# Patient Record
Sex: MALE | Race: WHITE | NOT HISPANIC OR LATINO | Employment: FULL TIME | URBAN - METROPOLITAN AREA
[De-identification: names, ages, dates, MRNs, and addresses within clinical notes are randomized per-mention and may not be internally consistent; named-entity substitution may affect disease eponyms.]

---

## 2017-01-17 DIAGNOSIS — E11.9 TYPE 2 DIABETES MELLITUS WITHOUT COMPLICATION, WITH LONG-TERM CURRENT USE OF INSULIN: ICD-10-CM

## 2017-01-17 DIAGNOSIS — Z79.4 TYPE 2 DIABETES MELLITUS WITHOUT COMPLICATION, WITH LONG-TERM CURRENT USE OF INSULIN: ICD-10-CM

## 2017-01-17 DIAGNOSIS — Z12.11 SPECIAL SCREENING FOR MALIGNANT NEOPLASMS, COLON: Primary | ICD-10-CM

## 2017-01-17 RX ORDER — SODIUM, POTASSIUM,MAG SULFATES 17.5-3.13G
1 SOLUTION, RECONSTITUTED, ORAL ORAL AS DIRECTED
Qty: 354 ML | Refills: 0 | Status: ON HOLD | OUTPATIENT
Start: 2017-01-17 | End: 2017-02-17 | Stop reason: ALTCHOICE

## 2017-01-17 NOTE — TELEPHONE ENCOUNTER
----- Message from Nasra Cramer sent at 1/17/2017  8:50 AM CST -----  Contact: self/ [phone]  RX request - refill or new RX.  Is this a refill or new RX:  refill  RX name and strength: insulin glargine (LANTUS SOLOSTAR) 100 unit/mL (3 mL) InPn pen  Directions:  Inject 60 Units into the skin once daily. INJECT 50 UNITS INTO THE SKIN IN THE EVENING - Subcutaneous  Is this a 30 day or 90 day RX:    Pharmacy name and phone #: [id number] - [place], [place] - [address]   Comments:

## 2017-01-18 RX ORDER — INSULIN GLARGINE 100 [IU]/ML
60 INJECTION, SOLUTION SUBCUTANEOUS DAILY
Qty: 15 ML | Refills: 6 | Status: SHIPPED | OUTPATIENT
Start: 2017-01-18 | End: 2017-01-18

## 2017-01-18 RX ORDER — INSULIN GLARGINE 100 [IU]/ML
INJECTION, SOLUTION SUBCUTANEOUS
Qty: 15 SYRINGE | Refills: 3 | Status: SHIPPED | OUTPATIENT
Start: 2017-01-18 | End: 2017-03-31 | Stop reason: SDUPTHER

## 2017-01-18 RX ORDER — PEN NEEDLE, DIABETIC 30 GX3/16"
1 NEEDLE, DISPOSABLE MISCELLANEOUS
Qty: 100 EACH | Refills: 12 | Status: ON HOLD | OUTPATIENT
Start: 2017-01-18 | End: 2022-11-28 | Stop reason: SDUPTHER

## 2017-01-23 DIAGNOSIS — E11.9 TYPE 2 DIABETES MELLITUS WITHOUT COMPLICATION: ICD-10-CM

## 2017-02-16 ENCOUNTER — ANESTHESIA EVENT (OUTPATIENT)
Dept: ENDOSCOPY | Facility: HOSPITAL | Age: 52
End: 2017-02-16
Payer: COMMERCIAL

## 2017-02-17 ENCOUNTER — ANESTHESIA (OUTPATIENT)
Dept: ENDOSCOPY | Facility: HOSPITAL | Age: 52
End: 2017-02-17
Payer: COMMERCIAL

## 2017-02-17 ENCOUNTER — SURGERY (OUTPATIENT)
Age: 52
End: 2017-02-17

## 2017-02-17 VITALS — RESPIRATION RATE: 18 BRPM

## 2017-02-17 PROBLEM — Z13.9 SCREENING: Status: ACTIVE | Noted: 2017-02-17

## 2017-02-17 PROCEDURE — D9220A PRA ANESTHESIA: Mod: PT,CRNA,, | Performed by: NURSE ANESTHETIST, CERTIFIED REGISTERED

## 2017-02-17 PROCEDURE — D9220A PRA ANESTHESIA: Mod: PT,ANES,, | Performed by: ANESTHESIOLOGY

## 2017-02-17 PROCEDURE — 63600175 PHARM REV CODE 636 W HCPCS: Performed by: NURSE ANESTHETIST, CERTIFIED REGISTERED

## 2017-02-17 PROCEDURE — 25000003 PHARM REV CODE 250: Performed by: NURSE PRACTITIONER

## 2017-02-17 PROCEDURE — 25000003 PHARM REV CODE 250: Performed by: NURSE ANESTHETIST, CERTIFIED REGISTERED

## 2017-02-17 RX ORDER — PROPOFOL 10 MG/ML
VIAL (ML) INTRAVENOUS
Status: DISCONTINUED | OUTPATIENT
Start: 2017-02-17 | End: 2017-02-17

## 2017-02-17 RX ORDER — PROPOFOL 10 MG/ML
VIAL (ML) INTRAVENOUS CONTINUOUS PRN
Status: DISCONTINUED | OUTPATIENT
Start: 2017-02-17 | End: 2017-02-17

## 2017-02-17 RX ORDER — METOPROLOL TARTRATE 1 MG/ML
INJECTION, SOLUTION INTRAVENOUS
Status: DISCONTINUED | OUTPATIENT
Start: 2017-02-17 | End: 2017-02-17

## 2017-02-17 RX ORDER — LIDOCAINE HCL/PF 100 MG/5ML
SYRINGE (ML) INTRAVENOUS
Status: DISCONTINUED | OUTPATIENT
Start: 2017-02-17 | End: 2017-02-17

## 2017-02-17 RX ADMIN — METOPROLOL TARTRATE 1 MG: 5 INJECTION, SOLUTION INTRAVENOUS at 09:02

## 2017-02-17 RX ADMIN — PROPOFOL 80 MG: 10 INJECTION, EMULSION INTRAVENOUS at 08:02

## 2017-02-17 RX ADMIN — METOPROLOL TARTRATE 1 MG: 5 INJECTION, SOLUTION INTRAVENOUS at 08:02

## 2017-02-17 RX ADMIN — PROPOFOL 20 MG: 10 INJECTION, EMULSION INTRAVENOUS at 08:02

## 2017-02-17 RX ADMIN — SODIUM CHLORIDE: 0.9 INJECTION, SOLUTION INTRAVENOUS at 08:02

## 2017-02-17 RX ADMIN — LIDOCAINE HYDROCHLORIDE 50 MG: 20 INJECTION, SOLUTION INTRAVENOUS at 08:02

## 2017-02-17 RX ADMIN — PROPOFOL 150 MCG/KG/MIN: 10 INJECTION, EMULSION INTRAVENOUS at 08:02

## 2017-02-17 NOTE — ANESTHESIA PREPROCEDURE EVALUATION
02/17/2017  Billy Sheffield Miguel is a 51 y.o., male.    [facility] Anesthesia Evaluation    I have reviewed the Patient Summary Reports.    I have reviewed the Nursing Notes.   I have reviewed the Medications.     Review of Systems  Anesthesia Hx:  No problems with previous Anesthesia  History of prior surgery of interest to airway management or planning: Denies Family Hx of Anesthesia complications.   Denies Personal Hx of Anesthesia complications.   Social:  Non-Smoker    Cardiovascular:   Hypertension Past MI CAD  CABG/stent  MI 2013 s/p PTCA  Works as , denies HAIDER climbing 2 FOS   Pulmonary:   Sleep Apnea Noncompliant with CPAP   Renal/:  Renal/ Normal     Hepatic/GI:   Bowel Prep. Denies GERD. Denies Liver Disease.    Neurological:  Neurology Normal    Endocrine:   Diabetes, poorly controlled, type 2, using insulin HbA1C > 11       Physical Exam  General:  Morbid Obesity    Airway/Jaw/Neck:  Airway Findings: Mouth Opening: Normal Tongue: Normal  General Airway Assessment: Adult  Mallampati: II  TM Distance: Normal, at least 6 cm  Jaw/Neck Findings:  Neck ROM: Normal ROM      Dental:  Dental Findings: Periodontal disease, SevereFront teeth upper & lower appear loose with severe decay, pt denies any loose teeth        Mental Status:  Mental Status Findings:  Cooperative, Alert and Oriented         Anesthesia Plan  Type of Anesthesia, risks & benefits discussed:  Anesthesia Type:  general  Patient's Preference:   Intra-op Monitoring Plan:   Intra-op Monitoring Plan Comments:   Post Op Pain Control Plan:   Post Op Pain Control Plan Comments:   Induction:   IV  Beta Blocker:  Patient is on a Beta-Blocker and has not received dose within the past 24 hours due to non-compliance or for other reasons (Patient should receive a perioperative dose or document why it is withheld).       Informed Consent:  Patient understands risks and agrees with Anesthesia plan.  Questions answered. Anesthesia consent signed with patient.  ASA Score: 3     Day of Surgery Review of History & Physical:    H&P update referred to the surgeon.         Ready For Surgery From Anesthesia Perspective.

## 2017-02-17 NOTE — ANESTHESIA POSTPROCEDURE EVALUATION
"Anesthesia Post Evaluation    Patient: Billy Rivera    Procedure(s) Performed: Procedure(s) (LRB):  COLONOSCOPY (N/A)    Final Anesthesia Type: general  Patient location during evaluation: GI PACU  Patient participation: Yes- Able to Participate  Level of consciousness: awake and alert  Post-procedure vital signs: reviewed and stable  Pain management: adequate  Airway patency: patent  PONV status at discharge: No PONV  Anesthetic complications: no      Cardiovascular status: hemodynamically stable  Respiratory status: unassisted, spontaneous ventilation and room air  Hydration status: euvolemic  Follow-up not needed.        Visit Vitals    /78    Pulse 63    Temp 36.8 °C (98.3 °F)    Resp 16    Ht 6' 2" (1.88 m)    Wt 127 kg (280 lb)    SpO2 96%    BMI 35.95 kg/m2       Pain/Gabriel Score: Pain Assessment Performed: Yes (2/17/2017  9:58 AM)  Presence of Pain: denies (2/17/2017  9:58 AM)  Gabriel Score: 10 (2/17/2017  9:58 AM)      "

## 2017-02-17 NOTE — TRANSFER OF CARE
"Anesthesia Transfer of Care Note    Patient: Billy Rivera    Procedure(s) Performed: Procedure(s) (LRB):  COLONOSCOPY (N/A)    Patient location: PACU    Anesthesia Type: general    Transport from OR: Transported from OR on room air with adequate spontaneous ventilation    Post pain: adequate analgesia    Post assessment: no apparent anesthetic complications and tolerated procedure well    Post vital signs: stable    Level of consciousness: sedated    Nausea/Vomiting: no nausea/vomiting    Complications: none          Last vitals:   Visit Vitals    BP (!) 159/94 (BP Location: Left arm, Patient Position: Sitting, BP Method: Automatic)    Pulse 67    Temp 36.8 °C (98.3 °F) (Oral)    Resp 17    Ht 6' 2" (1.88 m)    Wt 127 kg (280 lb)    SpO2 96%    BMI 35.95 kg/m2     "

## 2017-02-17 NOTE — ANESTHESIA RELEASE NOTE
"Anesthesia Release from PACU Note    Patient: Billy Rivera    Procedure(s) Performed: Procedure(s) (LRB):  COLONOSCOPY (N/A)    Anesthesia type: general    Post pain: Adequate analgesia    Post assessment: no apparent anesthetic complications and tolerated procedure well    Last Vitals:   Visit Vitals    /78    Pulse 63    Temp 36.8 °C (98.3 °F)    Resp 16    Ht 6' 2" (1.88 m)    Wt 127 kg (280 lb)    SpO2 96%    BMI 35.95 kg/m2       Post vital signs: stable    Level of consciousness: awake and alert     Nausea/Vomiting: no nausea/no vomiting    Complications: none    Airway Patency: patent    Respiratory: unassisted, spontaneous ventilation, room air    Cardiovascular: stable and blood pressure at baseline    Hydration: euvolemic  "

## 2017-02-22 ENCOUNTER — TELEPHONE (OUTPATIENT)
Dept: INTERNAL MEDICINE | Facility: CLINIC | Age: 52
End: 2017-02-22

## 2017-02-22 NOTE — TELEPHONE ENCOUNTER
----- Message from Bell Stovall sent at 2/22/2017  2:57 PM CST -----  Contact: Mobile: [phone]   Patient would like to get test results.  Name of test (lab, mammo, etc.):  Colonoscopy  Date of test:  2/17  Ordering provider: Dr. Baker   [facility] was the test performed:[facility]  Comments:

## 2017-02-23 NOTE — TELEPHONE ENCOUNTER
I spoke with the patient this evening regarding his recent colonoscopy and polyp biopsy.  Advise that on the polyps did show an area of adenocarcinoma in that further follow-up would be required.  He understood.  He is going to communicate with 's office in colorectal surgery for further discussion and disposition.

## 2017-03-10 ENCOUNTER — TELEPHONE (OUTPATIENT)
Dept: SURGERY | Facility: CLINIC | Age: 52
End: 2017-03-10

## 2017-03-10 NOTE — TELEPHONE ENCOUNTER
----- Message from Sophie Rahman sent at 3/10/2017  2:35 PM CST -----  Contact: pt#[phone]  Pt wants to speak with you about colonoscopy. Please call

## 2017-03-13 ENCOUNTER — OFFICE VISIT (OUTPATIENT)
Dept: SURGERY | Facility: CLINIC | Age: 52
End: 2017-03-13
Payer: COMMERCIAL

## 2017-03-13 ENCOUNTER — HOSPITAL ENCOUNTER (OUTPATIENT)
Dept: CARDIOLOGY | Facility: CLINIC | Age: 52
Discharge: HOME | End: 2017-03-13
Payer: COMMERCIAL

## 2017-03-13 VITALS
BODY MASS INDEX: 36.84 KG/M2 | HEART RATE: 54 BPM | WEIGHT: 287.06 LBS | SYSTOLIC BLOOD PRESSURE: 154 MMHG | DIASTOLIC BLOOD PRESSURE: 93 MMHG | HEIGHT: 74 IN

## 2017-03-13 DIAGNOSIS — C18.7 MALIGNANT NEOPLASM OF SIGMOID COLON: ICD-10-CM

## 2017-03-13 DIAGNOSIS — C18.7 MALIGNANT NEOPLASM OF SIGMOID COLON: Primary | ICD-10-CM

## 2017-03-13 PROCEDURE — 99999 PR PBB SHADOW E&M-EST. PATIENT-LVL IV: CPT | Mod: PBBFAC,,, | Performed by: COLON & RECTAL SURGERY

## 2017-03-13 PROCEDURE — 3080F DIAST BP >= 90 MM HG: CPT | Mod: S$GLB,,, | Performed by: COLON & RECTAL SURGERY

## 2017-03-13 PROCEDURE — 99213 OFFICE O/P EST LOW 20 MIN: CPT | Mod: S$GLB,,, | Performed by: COLON & RECTAL SURGERY

## 2017-03-13 PROCEDURE — 3077F SYST BP >= 140 MM HG: CPT | Mod: S$GLB,,, | Performed by: COLON & RECTAL SURGERY

## 2017-03-13 PROCEDURE — 1160F RVW MEDS BY RX/DR IN RCRD: CPT | Mod: S$GLB,,, | Performed by: COLON & RECTAL SURGERY

## 2017-03-13 PROCEDURE — 93000 ELECTROCARDIOGRAM COMPLETE: CPT | Mod: S$GLB,,, | Performed by: INTERNAL MEDICINE

## 2017-03-13 RX ORDER — NEOMYCIN SULFATE 500 MG/1
1000 TABLET ORAL SEE ADMIN INSTRUCTIONS
Qty: 6 TABLET | Refills: 0 | Status: SHIPPED | OUTPATIENT
Start: 2017-03-13 | End: 2017-03-30 | Stop reason: ALTCHOICE

## 2017-03-13 RX ORDER — METRONIDAZOLE 500 MG/1
500 TABLET ORAL SEE ADMIN INSTRUCTIONS
Qty: 3 TABLET | Refills: 0 | Status: SHIPPED | OUTPATIENT
Start: 2017-03-13 | End: 2017-03-30 | Stop reason: ALTCHOICE

## 2017-03-13 NOTE — LETTER
March 17, 2017        DRE Baker MD  [address]  [place] [address]             [facility]  [address]  [facility] [address]  Phone: [phone]   Patient: Billy Rivera   MR Number: [id number]   YOB: 1965   Date of Visit: 3/13/2017       Dear Dr. Baker:    Thank you for referring Billy Rivera to me for evaluation. Attached you will find relevant portions of my assessment and plan of care.    If you have questions, please do not hesitate to call me. I look forward to following Billy Rivera along with you.    Sincerely,      Simon Gomez MD            CC  No Recipients    Enclosure

## 2017-03-15 ENCOUNTER — TELEPHONE (OUTPATIENT)
Dept: CARDIOLOGY | Facility: CLINIC | Age: 52
End: 2017-03-15

## 2017-03-15 ENCOUNTER — ANESTHESIA EVENT (OUTPATIENT)
Dept: SURGERY | Facility: HOSPITAL | Age: 52
DRG: 331 | End: 2017-03-15
Payer: COMMERCIAL

## 2017-03-15 NOTE — TELEPHONE ENCOUNTER
----- Message from Brianna Rios RN sent at 3/15/2017 11:25 AM CDT -----  Patient is having a Sigmoidectomy-Laparoscopic on 4/5/17, with Dr. Gomez. Requesting preop instructions regarding patient takeing a 81mg -baby aspirin daily since placement of his CV stent.  Please advise whether patient needs to stop before surgery (how many days to hold?) or if patientt should continue to take prior to surgery date.  Await your reply.  Thank you. Sincerely,  Brianna Rios RN  3/15/17

## 2017-03-15 NOTE — TELEPHONE ENCOUNTER
----- Message from Tricia Taveras sent at 3/15/2017 12:08 PM CDT -----  Contact: Wendy/pre-op dept  Please call Wendy at [phone]. She did a pre-assessment today. Pt having a colon procedure on 04/05 with Dr Gomez. She has questions regarding pt taking baby aspirin because of his CV stent. Wendy sent Dr Joselito forrest in basket mess today also. Last seen Dr Yee 08/13/14    Thank you

## 2017-03-15 NOTE — ANESTHESIA PREPROCEDURE EVALUATION
Anesthesia Assessment: Preoperative EQUATION    Planned Procedure: Procedure(s) (LRB):  SIGMOIDECTOMY-LAPAROSCOPIC (N/A)  Requested Anesthesia Type:General  Surgeon: Simon Gomez MD  Service: Colon and Rectal  Known or anticipated Date of Surgery:4/5/2017    Surgeon notes: reviewed and Malignant neoplasm of sigmoid colon    Electronic QUestionnaire Assessment completed via nurse interview with patient.        Billy Rivera [[id number]] - 51 y.o. Male        Providers Outside of [facility]      No data to display       Surgical Risk Level      Surgical Risk Level:  3           caRDScore (Clinical Anesthesia Rapid Decision Score)        Very High  Total Score: 33      33 Sum of Clinical Scores       caRDScores (Grouped)      caRDScore - Ane:  4                caRDScore - CVD:  18                caRDScore - Pul:  1                caRDScore - Met:  7                caRDScore - Phy:  3           caRDScore Items           Pre-admit from 4/5/2017 in [facility]     Anesthesia      Other problem not listed above needing discussion  Yes [extremely sore muscles post-op with Colonoscopy-2/1017]     Has sleep apnea confirmed by a sleep study / on CPAP  Yes [does not use C-PAP]     Has/has had bowel disease of small or large intestine  Yes [colon cancer]     CVD      Activity similar to best ability for maximal activity or exercise  METS 4     Diagnosed with high blood pressure  Yes     Typical BP runs <150/90  Yes     Has had heart attack (MI) at some point in past  Yes [2/2017]     MI happened within past year  Yes     MI occurred within the last 6 months  Yes     Has coronary artery disease  Yes     Had stent done within the past year  Yes     Chest pain is called angina by physician (nitroglycerin prescribed)  Yes     Had angina in the last month  Yes [no chest pain since stent placement per patient]     PVD in abdomen or legs  Yes [history]     Pulmonary      Has sleep apnea confirmed by a sleep  study / on CPAP  Yes [does not use C-PAP]     Metabolic      Type 2 Diabetes  Yes     Taking chronic Insulin Rx now or in past, (not by pump)  Yes     Last A1C was between 9.0 and 12.0  Yes [11.7-6/24/16]     Is on oral Rx and/or non-insulin injectable for diabetes  Yes     Has hyperlipoproteinemia  Yes     Physiologic      Aspirin (taken because I have stents)  Yes       Flags      Red Flag Score:  2                Yellow Flag Score:  8           Red Flags           Pre-admit from 4/5/2017 in [facility]     Aspirin (taken because I have stents)  Yes     Had stent done within the past year  Yes       Yellow Flags           Pre-admit from 4/5/2017 in [facility]     Has had surgery within the last 3 months  Yes     Other problem not listed above needing discussion  Yes [extremely sore muscles post-op with Colonoscopy-2/1017]     Aspirin  Yes     Has sleep apnea confirmed by a sleep study / on CPAP  Yes [does not use C-PAP]     MI happened within past year  Yes     MI occurred within the last 6 months  Yes     Had angina in the last month  Yes [no chest pain since stent placement per patient]     Last A1C was between 9.0 and 12.0  Yes [11.7-6/24/16]       PONV Risk Score (assumes periop narcotic use = +1, Max=4)      PONV Risk Score:  2           PONV Risk Factors  Total Score: 1      1 Non-Smoker at present       Sleep Apnea  Total Score: 1      1 Has sleep apnea confirmed by a sleep study / on CPAP       MICHAELA STOP-Bang Risk Factors (Max=8)  Total Score: 6      1 Has loud snoring     1 Observed to have interrupted breathing during sleep     1 Takes medication for high blood pressure     1 Obesity Status: Obesity (BMI >35)     1 Age >50     1 Male       MICHAELA Risk Level - 1 (Low), 2 (Moderate), 3 (High)      MICHAELA Risk Level:  3           RCRI (Revised Cardiac Risk Indices of ACC/AHA guidelines, Max=6)  Total Score: 3      1 Has/has had CAD     1 Patient is having an intra-abdominal  thoracic or major vascular case     1 Taking chronic insulin       CAD Risk Factors  Total Score: 6      1 Male. Age >45     1 Exercise on a routine basis     1 Diagnosed with high blood pressure     1 Has/has had non-cardiac arterial blockages or aneurysm (PVD) now or in the past     1 Has Diabetes     1 Has hyperlipoproteinemia       CHADS Score if applicable (history of atrial fib/flutter, Max=6)  Total Score: 2      1 Diagnosed with high blood pressure     1 Has Diabetes       Maximal Exercise Capacity           Pre-admit from 4/5/2017 in [facility]     Maximal Exercise Capacity  METS 4       Summary of Dependence  Total Score: 1      1 Is totally independent of others for activities of daily living       Phone Fraility Score (Max = 17)  Total Score: 1      1 Uses 5 or more meds on reg basis       Pain Factors      No data to display       Risk Triggers (Evidence-Based Risk Triggers)        Pulmonary Risk Triggers  Total Score: 1      1 Has sleep apnea confirmed by a sleep study / on CPAP       Renal Risk Triggers  Total Score: 4      1 Diagnosed with high blood pressure     1 PVD in abdomen or legs     1 Type 2 Diabetes     1 Taking chronic Insulin Rx now or in past, (not by pump)       Delirium Risk Triggers  Total Score: 0        Urologic Risk Triggers  Total Score: 0        Logistics        Pre-op Clinic Logistics  Total Score: 19      1 Has had surgery within the last 3 months     1 Has had anesthesia, either as adult or as a child     1 Other problem not listed above needing discussion     3 Aspirin (taken because I have stents)     1 Takes medication for high blood pressure     1 MI happened within past year     2 MI occurred within the last 6 months     1 Has coronary artery disease     2 Had stent done within the past year     3 Chest pain has been evaluated by catheterization / angiogram     1 Had angina in the last month     2 Last A1C was between 9.0 and 12.0       DOSC Logistics   Total Score: 1      1 Aspirin (taken because I have stents)       Discharge Logistics  Total Score: 3      1 Has sleep apnea confirmed by a sleep study / on CPAP     2 Chest pain is called angina by physician (nitroglycerin prescribed)       Discharge Planning           Pre-admit from 4/5/2017 in [facility]     Discharge Planning      Will assist patient 24/7, if needed  -- [[phone]]       Anes & Int Med <For office use only>      Total Score: 28        Surgical Risk Level Assessment                 Triage considerations:     The patient has no apparent active cardiac condition (No unstable coronary Syndrome such as severe unstable angina or recent [<1 month] myocardial infarction, decompensated CHF, severe valvular   disease or significant arrhythmia)    Previous anesthesia records:2/17/17-Colonoscopy-General- Airway/Jaw/Neck:  Airway Findings: Mouth Opening: Normal Tongue: Normal General Airway Assessment: Adult Mallampati: II TM Distance: Normal, at least 6 cm Jaw/Neck Findings: Neck ROM: Normal ROM -No PONV - no apparent anesthetic complications and tolerated procedure well    Anesthesia Hx:  No problems with previous Anesthesia History of prior surgery of interest to airway management or planning: Denies Family Hx of Anesthesia complications. Denies Personal Hx of Anesthesia complications.     Last PCP note: 6-12 months ago , within [facility] , focused visit   Subspecialty notes: Cardiology: General, Endocrinology    Other important co-morbidities:   HTN (hypertension) 9/27/2012 - Present   NSTEMI May 2013 - peak troponin 0.22 5/22/2013 - Present   Dyslipidemia 5/22/2013 - Present   Type II or unspecified type diabetes mellitus with ophthalmic manifestations, uncontrolled 5/24/2013 - Present   Obesity, Class II, BMI 35-39.9, with comorbidity 5/24/2013 - Present   CAD s/p PCI of LAD (SHELBY) in May 2013 6/25/2013 - Present   BDR (background diabetic retinopathy) - Both Eyes  12/30/2013 - Present   Type II or unspecified type diabetes mellitus with cardiovascular complication, uncontrolled 6/15/2015 - Present   PVD (peripheral vascular disease) 6/15/2015 - Present   Type II or unspecified type diabetes mellitus with peripheral circulatory disorders, uncontrolled(250.72) 6/15/2015 - Present   Sleep apnea 6/15/2015 - Present   Microalbuminuria 6/15/2015 - Present   Screening        Tests already available:  Results have been reviewed.Labs-3/13/17-CEA/CBC/CMP/   EKG-3/13/17/ 6/24/16-Lab-A1c            Instructions given. (See in Nurse's note)    Optimization:  Anesthesia Preop Clinic Assessment  Indicated    Medical Opinion Indicated           Plan:    Testing:  A1C and T&S    Pre-anesthesia  visit       Visit focus: concerns in complex and/or prolonged anesthesia     Consultation:IM Perioperative Hospitalist     Patient  has previously scheduled Medical Appointment:Appointments on 3/24/17 prior to surgery date.    Navigation: Labs-A1c & T&S on  3/24/17 @ 7:30a              Consults scheduled.POC & Perioperative IM Consult on 3/24/17 @ 8a & 9a             Results will be tracked by Preop Clinic.                Brianna Rios RN  3/15/17                                                                                                             03/15/2017  Billy Sheffield Miguel is a 51 y.o., male.    [facility] Anesthesia Evaluation         Review of Systems  Anesthesia Hx:  No problems with previous Anesthesia History of prior surgery of interest to airway management or planning: heart surgery. Previous anesthesia: MAC  colonoscopy 2/2017 with MAC.  Procedure performed at an [facility] Facility.   Social:  Non-Smoker, No Alcohol Use    Hematology/Oncology:  Hematology Normal      Current/Recent Cancer. (sigmoid colon cancer)   EENT/Dental:   Glasses; jaw popping R   Cardiovascular:   Hypertension Past MI CAD  CABG/stent  hyperlipidemia NSTEMI 2013 s/p PTCA-SHELBY to LAD Functional Capacity good / => 4  METS, housework, stairs, mows yard; denies CP, SOB    Pulmonary:   Sleep Apnea Noncompliant with CPAP   Renal/:  Renal/ Normal     Hepatic/GI:   Denies GERD. Liver Disease,    Musculoskeletal:  Musculoskeletal Normal    Neurological:  Neurology Normal    Endocrine:   Diabetes (A1C 12.1  3/24/17), poorly controlled, type 2, using insulin    Psych:  Phobia and Claustrophobia (did not tolerate CPAP mask on face-did not like the straps).         Physical Exam  General:  Well nourished    Airway/Jaw/Neck:  Airway Findings: Mouth Opening: Normal Tongue: Normal  General Airway Assessment: Adult  Jaw/Neck Findings:     Neck ROM: Normal ROM  Neck Findings:  Girth Increased, Short Neck      Dental:  Dental Findings:         Mental Status:  Mental Status Findings:  Cooperative, Alert and Oriented       Pt was seen in POC 3/24/17; Medical optimization: please see EPIC notes for recommendations of pre-op medical consultant, Dr Shipley, for perioperative medical management. /Zainab Chaidez RN    Addendum 4/3/17 1400: Dr Cortez informed of most recent A1C result of 12.1; in basket message also sent to inform Dr Gomez (via staff) and referred to review Dr Shipley's recommendations in his note./Zainab Chaidez RN      Anesthesia Plan  Type of Anesthesia, risks & benefits discussed:  Anesthesia Type:  general  Patient's Preference:   Intra-op Monitoring Plan:   Intra-op Monitoring Plan Comments:   Post Op Pain Control Plan:   Post Op Pain Control Plan Comments:   Induction:   IV  Beta Blocker:  Patient is on a Beta-Blocker and has received one dose within the past 24 hours (No further documentation required).       Informed Consent: Patient understands risks and agrees with Anesthesia plan.  Questions answered. Anesthesia consent signed with patient.  ASA Score: 3     Day of Surgery Review of History & Physical:    H&P update referred to the surgeon.         Ready For Surgery From Anesthesia Perspective.

## 2017-03-15 NOTE — PRE ADMISSION SCREENING
Anesthesia Assessment: Preoperative EQUATION    Planned Procedure: Procedure(s) (LRB):  SIGMOIDECTOMY-LAPAROSCOPIC (N/A)  Requested Anesthesia Type:General  Surgeon: Simon Gomez MD  Service: Colon and Rectal  Known or anticipated Date of Surgery:4/5/2017    Surgeon notes: reviewed and Malignant neoplasm of sigmoid colon    Electronic QUestionnaire Assessment completed via nurse interview with patient.        Billy Rivera [[id number]] - 51 y.o. Male        Providers Outside of [facility]      No data to display       Surgical Risk Level      Surgical Risk Level:  3           caRDScore (Clinical Anesthesia Rapid Decision Score)        Very High  Total Score: 33      33 Sum of Clinical Scores       caRDScores (Grouped)      caRDScore - Ane:  4                caRDScore - CVD:  18                caRDScore - Pul:  1                caRDScore - Met:  7                caRDScore - Phy:  3           caRDScore Items           Pre-admit from 4/5/2017 in [facility]     Anesthesia      Other problem not listed above needing discussion  Yes [extremely sore muscles post-op with Colonoscopy-2/1017]     Has sleep apnea confirmed by a sleep study / on CPAP  Yes [does not use C-PAP]     Has/has had bowel disease of small or large intestine  Yes [colon cancer]     CVD      Activity similar to best ability for maximal activity or exercise  METS 4     Diagnosed with high blood pressure  Yes     Typical BP runs <150/90  Yes     Has had heart attack (MI) at some point in past  Yes [2/2017]     MI happened within past year  Yes     MI occurred within the last 6 months  Yes     Has coronary artery disease  Yes     Had stent done within the past year  Yes     Chest pain is called angina by physician (nitroglycerin prescribed)  Yes     Had angina in the last month  Yes [no chest pain since stent placement per patient]     PVD in abdomen or legs  Yes [history]     Pulmonary      Has sleep apnea confirmed by a sleep study  / on CPAP  Yes [does not use C-PAP]     Metabolic      Type 2 Diabetes  Yes     Taking chronic Insulin Rx now or in past, (not by pump)  Yes     Last A1C was between 9.0 and 12.0  Yes [11.7-6/24/16]     Is on oral Rx and/or non-insulin injectable for diabetes  Yes     Has hyperlipoproteinemia  Yes     Physiologic      Aspirin (taken because I have stents)  Yes       Flags      Red Flag Score:  2                Yellow Flag Score:  8           Red Flags           Pre-admit from 4/5/2017 in [facility]     Aspirin (taken because I have stents)  Yes     Had stent done within the past year  Yes       Yellow Flags           Pre-admit from 4/5/2017 in [facility]     Has had surgery within the last 3 months  Yes     Other problem not listed above needing discussion  Yes [extremely sore muscles post-op with Colonoscopy-2/1017]     Aspirin  Yes     Has sleep apnea confirmed by a sleep study / on CPAP  Yes [does not use C-PAP]     MI happened within past year  Yes     MI occurred within the last 6 months  Yes     Had angina in the last month  Yes [no chest pain since stent placement per patient]     Last A1C was between 9.0 and 12.0  Yes [11.7-6/24/16]       PONV Risk Score (assumes periop narcotic use = +1, Max=4)      PONV Risk Score:  2           PONV Risk Factors  Total Score: 1      1 Non-Smoker at present       Sleep Apnea  Total Score: 1      1 Has sleep apnea confirmed by a sleep study / on CPAP       MICHAELA STOP-Bang Risk Factors (Max=8)  Total Score: 6      1 Has loud snoring     1 Observed to have interrupted breathing during sleep     1 Takes medication for high blood pressure     1 Obesity Status: Obesity (BMI >35)     1 Age >50     1 Male       MICHAELA Risk Level - 1 (Low), 2 (Moderate), 3 (High)      MICHAELA Risk Level:  3           RCRI (Revised Cardiac Risk Indices of ACC/AHA guidelines, Max=6)  Total Score: 3      1 Has/has had CAD     1 Patient is having an intra-abdominal  thoracic or major vascular case     1 Taking chronic insulin       CAD Risk Factors  Total Score: 6      1 Male. Age >45     1 Exercise on a routine basis     1 Diagnosed with high blood pressure     1 Has/has had non-cardiac arterial blockages or aneurysm (PVD) now or in the past     1 Has Diabetes     1 Has hyperlipoproteinemia       CHADS Score if applicable (history of atrial fib/flutter, Max=6)  Total Score: 2      1 Diagnosed with high blood pressure     1 Has Diabetes       Maximal Exercise Capacity           Pre-admit from 4/5/2017 in [facility]     Maximal Exercise Capacity  METS 4       Summary of Dependence  Total Score: 1      1 Is totally independent of others for activities of daily living       Phone Fraility Score (Max = 17)  Total Score: 1      1 Uses 5 or more meds on reg basis       Pain Factors      No data to display       Risk Triggers (Evidence-Based Risk Triggers)        Pulmonary Risk Triggers  Total Score: 1      1 Has sleep apnea confirmed by a sleep study / on CPAP       Renal Risk Triggers  Total Score: 4      1 Diagnosed with high blood pressure     1 PVD in abdomen or legs     1 Type 2 Diabetes     1 Taking chronic Insulin Rx now or in past, (not by pump)       Delirium Risk Triggers  Total Score: 0        Urologic Risk Triggers  Total Score: 0        Logistics        Pre-op Clinic Logistics  Total Score: 19      1 Has had surgery within the last 3 months     1 Has had anesthesia, either as adult or as a child     1 Other problem not listed above needing discussion     3 Aspirin (taken because I have stents)     1 Takes medication for high blood pressure     1 MI happened within past year     2 MI occurred within the last 6 months     1 Has coronary artery disease     2 Had stent done within the past year     3 Chest pain has been evaluated by catheterization / angiogram     1 Had angina in the last month     2 Last A1C was between 9.0 and 12.0       DOSC Logistics   Total Score: 1      1 Aspirin (taken because I have stents)       Discharge Logistics  Total Score: 3      1 Has sleep apnea confirmed by a sleep study / on CPAP     2 Chest pain is called angina by physician (nitroglycerin prescribed)       Discharge Planning           Pre-admit from 4/5/2017 in [facility]     Discharge Planning      Will assist patient 24/7, if needed  -- [[phone]]       Anes & Int Med <For office use only>      Total Score: 28        Surgical Risk Level Assessment                 Triage considerations:     The patient has no apparent active cardiac condition (No unstable coronary Syndrome such as severe unstable angina or recent [<1 month] myocardial infarction, decompensated CHF, severe valvular   disease or significant arrhythmia)    Previous anesthesia records:2/17/17-Colonoscopy-General- Airway/Jaw/Neck:  Airway Findings: Mouth Opening: Normal Tongue: Normal General Airway Assessment: Adult Mallampati: II TM Distance: Normal, at least 6 cm Jaw/Neck Findings: Neck ROM: Normal ROM -No PONV - no apparent anesthetic complications and tolerated procedure well    Anesthesia Hx:  No problems with previous Anesthesia History of prior surgery of interest to airway management or planning: Denies Family Hx of Anesthesia complications. Denies Personal Hx of Anesthesia complications.     Last PCP note: 6-12 months ago , within [facility] , focused visit   Subspecialty notes: Cardiology: General, Endocrinology    Other important co-morbidities:   HTN (hypertension) 9/27/2012 - Present   NSTEMI May 2013 - peak troponin 0.22 5/22/2013 - Present   Dyslipidemia 5/22/2013 - Present   Type II or unspecified type diabetes mellitus with ophthalmic manifestations, uncontrolled 5/24/2013 - Present   Obesity, Class II, BMI 35-39.9, with comorbidity 5/24/2013 - Present   CAD s/p PCI of LAD (SHELBY) in May 2013 6/25/2013 - Present   BDR (background diabetic retinopathy) - Both Eyes  12/30/2013 - Present   Type II or unspecified type diabetes mellitus with cardiovascular complication, uncontrolled 6/15/2015 - Present   PVD (peripheral vascular disease) 6/15/2015 - Present   Type II or unspecified type diabetes mellitus with peripheral circulatory disorders, uncontrolled(250.72) 6/15/2015 - Present   Sleep apnea 6/15/2015 - Present   Microalbuminuria 6/15/2015 - Present   Screening        Tests already available:  Results have been reviewed.Labs-3/13/17-CEA/CBC/CMP/   EKG-3/13/17/ 6/24/16-Lab-A1c/            Instructions given. (See in Nurse's note)    Optimization:  Anesthesia Preop Clinic Assessment  Indicated    Medical Opinion Indicated           Plan:    Testing:  A1C and T&S    Pre-anesthesia  visit       Visit focus: concerns in complex and/or prolonged anesthesia     Consultation:IM Perioperative Hospitalist     Patient  has previously scheduled Medical Appointment:Appointments on 3/24/17 prior to surgery date.    Navigation: Labs-A1c & T&S on  3/24/17 @ 7:30a              Consults scheduled.POC & Perioperative IM Consult on 3/24/17 @ 8a & 9a             Results will be tracked by Preop Clinic.                Brianna Rios, RN  3/15/17

## 2017-03-24 ENCOUNTER — HOSPITAL ENCOUNTER (OUTPATIENT)
Dept: PREADMISSION TESTING | Facility: HOSPITAL | Age: 52
Discharge: HOME | End: 2017-03-24
Attending: COLON & RECTAL SURGERY
Payer: COMMERCIAL

## 2017-03-24 ENCOUNTER — INITIAL CONSULT (OUTPATIENT)
Dept: INTERNAL MEDICINE | Facility: CLINIC | Age: 52
End: 2017-03-24
Payer: COMMERCIAL

## 2017-03-24 VITALS
TEMPERATURE: 99 F | OXYGEN SATURATION: 95 % | HEART RATE: 68 BPM | BODY MASS INDEX: 36.86 KG/M2 | DIASTOLIC BLOOD PRESSURE: 84 MMHG | HEIGHT: 74 IN | WEIGHT: 287.25 LBS | SYSTOLIC BLOOD PRESSURE: 127 MMHG

## 2017-03-24 DIAGNOSIS — I10 ESSENTIAL HYPERTENSION: ICD-10-CM

## 2017-03-24 DIAGNOSIS — E11.39 TYPE II OR UNSPECIFIED TYPE DIABETES MELLITUS WITH OPHTHALMIC MANIFESTATIONS, UNCONTROLLED(250.52): ICD-10-CM

## 2017-03-24 DIAGNOSIS — R60.9 EDEMA, UNSPECIFIED TYPE: ICD-10-CM

## 2017-03-24 DIAGNOSIS — I25.10 CORONARY ARTERY DISEASE INVOLVING NATIVE CORONARY ARTERY OF NATIVE HEART WITHOUT ANGINA PECTORIS: ICD-10-CM

## 2017-03-24 DIAGNOSIS — E11.65 TYPE II OR UNSPECIFIED TYPE DIABETES MELLITUS WITH OPHTHALMIC MANIFESTATIONS, UNCONTROLLED(250.52): ICD-10-CM

## 2017-03-24 DIAGNOSIS — G47.30 SLEEP APNEA, UNSPECIFIED TYPE: Primary | ICD-10-CM

## 2017-03-24 DIAGNOSIS — R80.9 MICROALBUMINURIA: ICD-10-CM

## 2017-03-24 DIAGNOSIS — I21.4 NSTEMI (NON-ST ELEVATED MYOCARDIAL INFARCTION): ICD-10-CM

## 2017-03-24 PROCEDURE — 99999 PR PBB SHADOW E&M-EST. PATIENT-LVL IV: CPT | Mod: PBBFAC,,, | Performed by: HOSPITALIST

## 2017-03-24 PROCEDURE — 86850 RBC ANTIBODY SCREEN: CPT

## 2017-03-24 PROCEDURE — 83036 HEMOGLOBIN GLYCOSYLATED A1C: CPT

## 2017-03-24 PROCEDURE — 99244 OFF/OP CNSLTJ NEW/EST MOD 40: CPT | Mod: S$GLB,,, | Performed by: HOSPITALIST

## 2017-03-24 PROCEDURE — 86900 BLOOD TYPING SEROLOGIC ABO: CPT

## 2017-03-24 PROCEDURE — 36415 COLL VENOUS BLD VENIPUNCTURE: CPT

## 2017-03-24 NOTE — PROGRESS NOTES
Chief complaint-Preoperative evaluation , Perioperative Medical management, complication reduction plan     Date of Evaluation- 03/24/2017    PCP-  P Shree Baker MD    Future cases for Billy Rivera [[id number]]     Case ID Status Date Time Reginaldo Procedure Provider Location    [id number] [facility] 4/5/2017 10:00  SIGMOIDECTOMY-LAPAROSCOPIC Simon Gomez MD [[id number]] [facility] OR 2ND FLR      Malignant neoplasm of sigmoid colon    History of present illness- I had the pleasure of meeting this pleasant 51 y.o. gentleman in the pre op clinic prior to his elective Abdominal surgery. The patient is new to me .     I have obtained the history by speaking to the patient and by reviewing the electronic health records.    Events leading up to surgery / History of presenting illness -    Underwent a routine colonoscopy in Feb 2017 and was found to have polyps and was found to have malignant neoplasm of the sigmoid colon   No pain from this .No aggravating factors. No relieving factors     Relevant health conditions of significance for the perioperative period/ History of presenting illness -    HTN (hypertension) ,On medication  Usual BP readings 120-130/80's   Goal BP < 140/ <90    Type 2 Diabetes Mellitus  On treatment with oral agent Metformin , Novolog with meals and Lantus at bed time  Hemoglobin A1c- 11.7 - June 2016   Capillary glucose check-yes  Pre breakfast -150-160  Pre lunch -180-190  Bed time-140-150  Works as a security and as a    Variable meal pattern with reference to the number of meals and times of meals  Usual Breakfast- bowl of cereal/ eggs toast/ oat meal - Milk/ tea  Usual lunch - white bread sand witch/ plate lunch with meat, vegetable, potato, Diet coke  Usual Supper  - same as lunch- diet coke, milk, tea water  Usually skips Insulin once in 1-2 weeks  No tingling , numbness   Type II or unspecified type diabetes mellitus with ophthalmic manifestations, uncontrolled    BDR (background diabetic  retinopathy) - Both Eyes   Microalbuminuria   Was under Endocrine evaluation    Coronary artery disease -History of MI May 2013  CAD s/p PCI of LAD (SHELBY) in May 2013  No History of CABG.History of stenting  Chest pain- Location-central, shoulder blades felt weak different occasions, Radiation-none at  Rest,not  associated with shortness of breath, sweating, nausea, No more person  He had LHC -5/23/13 ,-LAD- IVUS of prox LAD 95% stenosis. Mid LAD 50% ,-D1-75%   -LCX 75% ,-RCA was not found ,INTERVENTION: Proximal LAD: The lesion was successfully intervened. Post-stenosis of 0%The following items were used: Stent Xience Rx 2.5mm X 12mm (SHELBY).   ASA, Plavix combination for 1 year and currently on ASA through the surgery    Sleep apnea .Not using either CPAP/BIPAP .. Informed the risk of worsening sleep apnea in the perioperative period  Avoidance of  supine sleep, weight gain and alcoholic beverages discussed since all of these can worsen MICHAELA     Obesity- suggested weight loss    Active cardiac conditions- none    Revised cardiac risk index predictors- coronary artery disease and insulin requiring diabetes mellitus     Functional capacity -Examples of physical activity- does what he wants,    house work,  can take 1 flight of stairs,  cutting the grass with a mower,  raking lawn ,  planting shrubs,  weeding garden,  swimming ,  dancing  and  digging ----- He can undertake all the above activities without  chest pain,chest tightness, Shortness of breath ,dizziness,lightheadedness making his exercise tolerance more  than 4 Mets.       Review of symptoms    ROS    Constitutional - No significant weight changes ,No fever  Eyes- No new vision changes  ENT- sleep apnea  Cardiac-As above   Respiratory- No cough, expectoration and  no hemoptysis  GI- Bowel movements  regular  No overt GI/ blood losses   -No dysuria and  no urinary hesitancy   MS-No unusual muscle,Joint pain  Neurologic-No unilateral weakness   Psychiatric-  No depression,Anxiety  Endocrine-  Prednisone use for over 3 weeks -no, hyperglycemic effects discussed  Hematological/Lymphatic-No spontaneous bruising, bleeding    Past Medical history- reviewed in EPIC    Pertinent negatives-   DVT-  Pulmonary embolism-      Past Surgical history - reviewed in EPIC-    Most recent procedure- Stenting  No Anaesthetic,Bleeding ,Cardiac problems with previous surgeries-  No history of delirium -  No history of post operative  nausea, vomiting-   Transiently felt generally weak after colonoscopy lasting for 5 minutes that resolved by itself    Family history- reviewed in EPIC    Heart disease-mother - older, brother- late 40's   Thrombosis- None-  Anaesthetic complications- None-  Diabetes Mellitus -parents, grand parents on both sides    Social history- reviewed in EPIC    Lives with wife  Help available post op     Medications and Allergies - reviewed in EPIC    Exam    Physical Exam  Constitutional- Vitals - Body mass index is 36.88 kg/(m^2).,   Vitals:    03/24/17 0801   BP: 127/84   Pulse: 68   Temp: 98.7 °F (37.1 °C)   sat 95 %  General appearance-Conscious,Coherent  Eyes- No conjunctival icterus,pupils  round  and reactive to light   ENT-Oral cavity- moist  , Hearing grossly normal   Neck- No thyromegaly ,Trachea -central, No jugular venous distension, No Carotid Bruit ,  Cardiovascular -Heart Sounds- Normal  and  no murmur   , No gallop rhythm   Respiratory - Normal Respiratory Effort, Normal breath sounds and  no wheeze    Peripheral pitting pedal edema-- mild and  bilateral lower extremity telangiectasia , no calf pain   Gastrointestinal -Soft abdomen, No palpable masses, Non Tender,Liver,Spleen not palpable. No-- free fluid and shifting dullness  Musculoskeletal- No finger Clubbing. Strength grossly normal   Lymphatic-No Palpable cervical, axillary,Inguinal lymphadenopathy   Psychiatric - normal effect,Orientation  Rt Dorsalis pedis pulses-palpable    Lt Dorsalis pedis  pulses- palpable   Rt Posterior tibial pulses -palpable   Left posterior tibial pulses -palpable   Miscellaneous - onychomycosis,  no suggestion of bacterial feet infection and  no renal bruit    Investigations    Review of Medicine tests    EKG- I had independently reviewed the EKG from--3/13/2017  It was reported to be showing     Sinus bradycardia  Left axis deviation  When compared with ECG of 24-MAY-2013 03:32,  Premature atrial complexes are no longer Present  T wave inversion no longer evident in Anterior leads    Echo May 2013        1 - Normal left ventricular function (EF 60%).     2 - Concentric remodeling.     3 - Normal diastolic function.     4 - Normal right ventricular function  Review of clinical lab tests-Date--3/13/2017- Creatinine-1.00 Adjusted anion gap 11  Date--3/13/2017 Hemoglobin--N  Platelet count--N    Review of old records- Was done and information gathered regards to events leading to surgery and health conditions of significance in the perioperative period    Orders placed-Endocrinology consultation      Assessment and plan    New problems to me      Preoperative  risk assessment    Cardiac    Revised cardiac risk index ( RCRI ) predictors-2 ---.Functional capacity  Is more than 4 Mets. He will be undergoing a  abdominal procedure that carries a intermediate risk     The estimated risk of the rate of adverse cardiac outcomes   6.6 %  No further cardiac work up is indicated prior to proceeding with the surgery     American Society of Anesthesiologists Physical status classification ( ASA ) class- - 3/4    Postoperative pulmonary complication risk  Canet-Low , if duration of surgery is less than 3, Intermediate , if over 3 hours       Perioperative Medical management / Optimization    Type 2 Diabetes Mellitus uncontrolled -I suggest monitoring the glucose in the perioperative period ( Before meals and bed time,if the patient is on oral feeds or every 6 hourly ,if the patient is NPO  )  Blood glucose targets in hospitalized patients are ( Critical care patients 140-180 mg/dl, Med/Surg patients ( non critical care) 100-140 mg/dl, patients on continuous enteral or parenteral nutrition 140-180 mg/dl )  .Oral Hypoglycemic agents are generally avoided during the hospital stay . If glucose is consistently elevated ,I suggest using basal ,prandial Insulin regimen to control the glucose , as elevated glucose can be associated with adverse surgical out comes. Please consider involving Hospital Medicine or Endocrinology ,if any help is needed with Glucose control. Patient will be instructed based on the pre op clinic guidelines  about adjustment of diabetic treatment  considering the NPO status for Surgery     I had educated that uncontrolled DM can cause post op complications,risk of infection, wound healing problem,increased length of stay in hospital and its associated complications.I suggest exercise as much as possible and follow diabetic diet  Suggested regular meal pattern, avoidance of sodas ,avoidance of white bread, excessive starches. Tried calling wife to discuss importance of DM control - could not talk      Sleep apnea- I suggest caution with usage of medication that can cause respiratory suppression in the perioperative period  potential ramifications of untreated sleep apnea, which could include daytime sleepiness, hypertension, heart disease and stroke were discussed    HLD-I  suggest continuation of statin during the entire perioperative period.    Hypertension-  Blood pressure is acceptable . I suggest continuation of -Coreg during the entire perioperative period. I suggest holding Lisinopril- on the morning of the surgery and can continue that  post operatively under blood pressure, electrolyte and renal function monitoring as long as they are acceptable.I suggest addressing pain control as uncontrolled pain can increased blood pressure   Goal BP < 140/ <90    Coronary artery disease  -History of MI May 2013  CAD s/p PCI of LAD (SHELBY) in May 2013  Stable    Obesity- suggested weight loss    Edema- I suggested avoidance of added salt,avoidance of NSAID's ( Except ASA 81 mg ) and suggested Limb elevation and sha hose use        Preventive perioperative care    Thromboembolic prophylaxis:  His risk factors for thrombosis include cancer, obesity, surgical procedure and age.I suggest  thromboembolic prophylaxis ( mechanical/pharmacological, weighing the risk benefits of pharmacological agent use considering oralia procedural bleeding )  during the perioperative period.I suggested being active in the post operative period.     Postoperative pulmonary complication prophylaxis-Risk factors for post operative pulmonary complications include sleep apnea and  ASA class >2- I suggest incentive Spirometry use ,  early ambulation ,  end tidal carbon dioxide  Monitoring  and   pain control so as to avoid diaphragmatic splinting       Renal complication prophylaxis-Risk factors for renal complications include Diabetes Mellitus, Hypertension and  vascular disease . I suggest keeping him well hydrated .I suggested drinking 2 litre's of water a day     Surgical site Infection Prophylaxis-I  suggest appropriate antibiotic for Prophylaxis against Surgical site infections    This visit was focussed on Preoperative evaluation , Perioperative Medical management, complication reduction plans and I suggest that the patient follows up with the primary care ,relevant sub specialists for on going health care      I appreciate the opportunity to be involved in this  pleasant  gentleman's care.Please feel free to contact me if there were any questions about this consultation     Patient is optimized- for the above     Dr JOHANA Shipley MD [facility] ( ),FACP   Perioperative Medicine  [facility]   Pager [phone]  -----------------------------------------------------    3/24- 14 28     A1c from today is 12 .1    Corresponded to Endocrinology  Called to discuss A1c- spoke to patient   Suggested sending CBG readings to me  ---------------------------------------    3/27- 1303     Called and spoke to patient   CBG- 250 before meals , 2 hours post meals  If glucose over 150 at meal time adds 10 units meal time insulin with no hypoglycemia  Increase Lantus to 52 units- may need to split the dose to twice daily   May need more Novolog scheduled     -----------------------    3/29- 8 27     Corresponded to Endocrinology - may not be able to get his glucose optimized prior to surgery . Given the cancer diagnosis we may have to accept some sub optimal glucose control , given the risk of cancer spread with delay in surgery  Chart reviewed - patient opted to come to [facility] for his diabetic care   ------------------------------------    3/31- 1703     CT - 3/31- no metastasis  Had endo visit today - Noted plan for Increased novolog  -------------------    4/3- 17 46     Had endocrinology evaluation   As per Endo Note Reports his is checking his BG TID (AM, afternoon(prelunch), bedtime). He reports all the readings are 180-190  cut back on his Lantus by 25% to 39 units tonight and the following night before surgery. Hold Novolog while on sugar-free clears and NPO  -------------------------------    4/4- 15 10     Called to follow up   unable to speak to patient   Left a message   Suggested Diabetic control  ---------------------    4/4- 17 55    Called to follow up   Left a message -reinforced DM control

## 2017-03-24 NOTE — LETTER
March 24, 2017      Miguel Nazario MD  [address] [facility] [address]           [facility] - Pre Op Consult  [address] [facility] [address]  Phone: [phone]          Patient: Billy Rivera   MR Number: [id number]   YOB: 1965   Date of Visit: 3/24/2017       Dear Dr. Miguel Nazario:    Thank you for referring Billy Rivera to me for evaluation. Attached you will find relevant portions of my assessment and plan of care.    If you have questions, please do not hesitate to call me. I look forward to following Billy Rivera along with you.    Sincerely,    Idalia Shipley MD    Enclosure  CC:  MD DRE Johnston MD Ainsley N. Jarrott, APRN,FNP-C  Billy Yee MD    If you would like to receive this communication electronically, please contact [email or web address] or [phone] to request more information on [employer] access.    For providers and/or their staff who would like to refer a patient to [facility], please contact us through our one-stop-shop provider referral line, [facility], at [phone].    If you feel you have received this communication in error or would no longer like to receive these types of communications, please e-mail [email or web address]

## 2017-03-24 NOTE — IP AVS SNAPSHOT
[facility]  [address]  [facility] [address]  Phone: [phone]           Patient Discharge Instructions    Our goal is to set you up for success. This packet includes information on your condition, medications, and your home care. It will help you to care for yourself so you don't get sicker.     Please ask your nurse if you have any questions.        There are many details to remember when preparing for your surgery. Here is what you will need to do, please ask your nurse if there are more specific instructions and if you have any questions:    1. 24 hours before procedure Do not smoke or drink alcoholic beverages 24 hours prior to your procedure    2. Eating before procedure Do not eat or drink anything 8 hours before your procedure - this includes gum, mints, and candy.     3. Day of procedure Please remove all jewelry for the procedure. If you wear contact lenses, dentures, hearing aids or glasses, bring a container to put them in during your surgery and give to a family member for safekeeping.  If your doctor has scheduled you for an overnight stay, bring a small overnight bag with any personal items that you need.    4. After procedure Make arrangements in advance for transportation home by a responsible adult. It is not safe to drive a vehicle during the 24 hours following surgery.     PLEASE NOTE: You may be contacted the day before your surgery to confirm your surgery date and arrival time. The Surgery schedule has many variables which may affect the time of your surgery case. Family members should be available if your surgery time changes.                [facility]  Unless otherwise directed by your provider, please contact [facility], our nurse care line that is available for 24/7 assistance.     [phone] (toll-free)    Registered nurses in the [facility] provide clinical advisement, health education, appointment booking, and other  advisory services.                    ** Verify the list of medication(s) below is accurate and up to date. Carry this with you in case of emergency. If your medications have changed, please notify your healthcare provider.             Medication List      TAKE these medications        Additional Info                      aspirin 81 MG Chew   Refills:  0   Dose:  81 mg    Instructions:  Take 81 mg by mouth every evening. swallows     Begin Date    AM    Noon    PM    Bedtime       atorvastatin 80 MG tablet   Commonly known as:  LIPITOR   Quantity:  30 tablet   Refills:  6   Dose:  80 mg    Instructions:  Take 1 tablet (80 mg total) by mouth once daily.     Begin Date    AM    Noon    PM    Bedtime       blood sugar diagnostic Strp   Quantity:  150 strip   Refills:  12    Instructions:  Strips and lancets  Use before meals and bedtime     Begin Date    AM    Noon    PM    Bedtime       carvedilol 12.5 MG tablet   Commonly known as:  COREG   Quantity:  60 tablet   Refills:  3   Dose:  12.5 mg    Instructions:  Take 1 tablet (12.5 mg total) by mouth 2 (two) times daily.     Begin Date    AM    Noon    PM    Bedtime       LANTUS SOLOSTAR 100 unit/mL (3 mL) Inpn pen   Quantity:  15 Syringe   Refills:  3   Generic drug:  insulin glargine    Instructions:  INJECT 50 UNITS SUBCUTANEOUSLY IN THE EVENING     Begin Date    AM    Noon    PM    Bedtime       lisinopril 20 MG tablet   Commonly known as:  PRINIVIL,ZESTRIL   Quantity:  30 tablet   Refills:  6   Dose:  20 mg    Instructions:  Take 1 tablet (20 mg total) by mouth once daily.     Begin Date    AM    Noon    PM    Bedtime       metformin 1000 MG tablet   Commonly known as:  GLUCOPHAGE   Quantity:  60 tablet   Refills:  6   Dose:  1000 mg    Instructions:  Take 1 tablet (1,000 mg total) by mouth 2 (two) times daily with meals.     Begin Date    AM    Noon    PM    Bedtime       metronidazole 500 MG tablet   Commonly known as:  FLAGYL   Quantity:  3 tablet   Refills:  0  "  Dose:  500 mg    Instructions:  Take 1 tablet (500 mg total) by mouth As instructed. Take 1 tablet at 1pm, 2pm, 11pm the day before surgery     Begin Date    AM    Noon    PM    Bedtime       neomycin 500 mg Tab   Commonly known as:  MYCIFRADIN   Quantity:  6 tablet   Refills:  0   Dose:  1000 mg    Instructions:  Take 2 tablets (1,000 mg total) by mouth As instructed. Take 2 tablets at 1pm, 2pm, 11pm the day before surgery     Begin Date    AM    Noon    PM    Bedtime       nitroGLYCERIN 0.4 MG SL tablet   Commonly known as:  NITROSTAT   Quantity:  30 tablet   Refills:  1   Dose:  0.4 mg    Instructions:  Place 1 tablet (0.4 mg total) under the tongue every 5 (five) minutes as needed for Chest pain.     Begin Date    AM    Noon    PM    Bedtime       NOVOLOG FLEXPEN 100 unit/mL Inpn pen   Quantity:  15 Syringe   Refills:  6   Generic drug:  insulin aspart    Instructions:  INJECT  15 UNITS  SUBCUTANEOUSLY WITH MEALS     Begin Date    AM    Noon    PM    Bedtime       pen needle, diabetic 31 gauge x 3/16" Ndle   Quantity:  100 each   Refills:  12   Dose:  1 each    Instructions:  Inject 1 each into the skin 3 (three) times daily before meals.     Begin Date    AM    Noon    PM    Bedtime                  Please bring to all follow up appointments:    1. A copy of your discharge instructions.  2. All medicines you are currently taking in their original bottles.  3. Identification and insurance card.    Please arrive 15 minutes ahead of scheduled appointment time.    Please call 24 hours in advance if you must reschedule your appointment and/or time.        Your Scheduled Appointments     Mar 24, 2017  1:00 PM CDT   Ct Abd Pel W Contrast with [facility] CT2 64- LIMIT 600 LBS   [facility] )    [address] [address]   [phone]            Mar 24, 2017  1:15 PM CDT   CT Chest with [facility] CT1 64- LIMIT 450 LBS   [facility]    [address] [id number] [phone]            Mar 30, 2017  9:40 AM CDT   Established Patient Visit with DRE Baker MD   [facility]    [address]  [place] [id number] [phone]              Your Future Surgeries/Procedures     Apr 05, 2017   Surgery with Simon Gomez MD   [facility] ([facility])    [address] [id number] [phone]                  Discharge Instructions       Your surgery has been scheduled for:__________________________________________    You should report to:  ____[facility], located on the [place] side of the first floor of the           [facility] ([phone])  ____[facility], located on the [facility] side of the            Second floor of the [facility] ([phone])  ____3rd Floor [facility] located on the [facility] side of the [facility] [phone]  Please Note   - Tell your doctor if you take Aspirin, products containing Aspirin, herbal medications  or blood thinners, such as Coumadin, Ticlid, or Plavix.  (Consult your provider regarding holding or stopping before surgery).  - Arrange for someone to drive you home following surgery.  You will not be allowed to leave the surgical facility alone or drive yourself home following sedation and anesthesia.  Before Surgery  - Stop taking all herbal medications 14days prior to surgery  - No Motrin/Advil (Ibuprofen) 7 days before surgery  - No Aleve (Naproxen) 7 days before surgery  - Stop Taking Asprin, products containing Asprin _____days before surgery  - Stop taking blood thinners_______days before surgery  - Refrain from drinking alcoholic beverages for 24hours before and after surgery  - Stop or limit smoking _________days before surgery  Night before Surgery  - DO NOT EAT OR DRINK  ANYTHING AFTER MIDNIGHT, INCLUDING GUM, HARD CANDY, MINTS, OR CHEWING TOBACCO.  - Take a shower or bath (shower is recommended).  Bathe with Hibiclens soap or an antibacterial soap from the neck down.  If not supplied by your surgeon, hibiclens soap will need to be purchased over the counter in pharmacy.  Rinse soap off thoroughly.  - Shampoo your hair with your regular shampoo  The Day of Surgery  - Take another bath or shower with hibiclens or any antibacterial soap, to reduce the chance of infection.  - Take heart and blood pressure medications with a small sip of water, as advised by the perioperative team.  - Do not take fluid pills  - You may brush your teeth and rinse your mouth, but do not swall any additional water.   - Do not apply perfumes, powder, body lotions or deodorant on the day of surgery.  - Nail polish should be removed.  - Do not wear makeup or moisturizer  - Wear comfortable clothes, such as a button front shirt and loose fitting pants.  - Leave all jewelry, including body piercings, and valuables at home.    - Bring any devices you will neeed after surgery such as crutches or canes.  - If you have sleep apnea, please bring your CPAP machine  In the event that your physical condition changes including the onset of a cold or respiratory illness, or if you have to delay or cancel your surgery, please notify your surgeon.Anesthesia: General Anesthesia  Youre due to have surgery. During surgery, youll be given medication called anesthesia. (It is also called anesthetic.) This will keep you comfortable and pain-free. Your surgeon will use general anesthesia. This sheet tells you more about it.    What Is General Anesthesia?  General anesthesia puts you into a state like deep sleep. It goes into the bloodstream (IV anesthetics), into the lungs (gas anesthetics), or both. You feel nothing during the procedure. You will not remember it. During the procedure, the anesthesia provider monitors you. He  or she checks your heart rate and rhythm, blood pressure, and blood oxygen.  · IV Anesthetics  IV anesthetics are given through an IV line in your arm. Theyre often given first. This is so you are asleep before a gas anesthetic is started. Some kinds of IV anesthetics relieve pain. Others relax you. Your doctor will decide which kind is best in your case.  · Gas Anesthetics  Gas anesthetics are breathed into the lungs. They are often used to keep you asleep. They can be given through a facemask, an endotracheal tube, or a laryngeal mask airway.  ¨ If you have a facemask, your anesthesia provider will most likely place it over your nose and mouth while youre still awake. Youll breathe oxygen through the mask as your IV anesthetic is started. Gas anesthetic may be added through the mask.  ¨ If you have an endotracheal tube or a laryngeal mask airway, it will be inserted into your throat after youre asleep.  Anesthesia Tools and Medications  You will likely have:  · IV anesthetics sent through an IV line into your bloodstream.  · Gas anesthetics breathed into your lungs, where they pass into your bloodstream.  · A pulse oximeter on the end of your finger. This measures your blood oxygen level.  · Electrocardiography leads (electrodes) on your chest. These record your heart rate and rhythm.  · A blood pressure cuff. This reads your blood pressure.  Risks and Possible Complications  General anesthesia has some risks. These include:  · Breathing problems  · Nausea and vomiting  · Sore throat or hoarseness  · Allergic reaction to the anesthetic  · Ongoing numbness (rare)  · Irregular heartbeat (rare)  · Cardiac arrest (rare)   Anesthesia Safety  · Follow all instructions you are given for how long not to eat or drink before your procedure.  · Be sure your doctor knows what medications you take. This includes over-the-counter medications, herbs, and supplements.  · Have an adult family member or friend drive you home  after the procedure.  · For the first 24 hours after your surgery:  ¨ Do not drive or use heavy equipment.  ¨ Do not make important decisions or sign documents.  ¨ Avoid alcohol.  ¨ Have someone stay with you, if possible. They can watch for problems and help keep you safe.  © 5527-9201 [employer], [address] [place] [address]. All rights reserved. This information is not intended as a substitute for professional medical care. Always follow your healthcare professional's instructions.      Admission Information     Date & Time Provider Department CSN    3/24/2017  9:00 AM Simon Gomez MD [facility] [id number]      Care Providers     Provider Role Specialty Primary office phone    Simon Gomez MD Attending Provider Colon and Rectal Surgery [phone]      Recent Lab Values        8/16/2011 8/24/2012 5/23/2013 10/24/2013 1/21/2014 2/23/2015 6/10/2015 6/24/2016      8:05 AM  8:10 AM  4:20 AM 12:00 PM  9:40 AM  7:17 AM  7:08 AM  7:29 AM    A1C 10.5 (H) 12.6 (H) 13.5 (H) 8.3 (H) 9.1 (H) 11.7 (H) 11.7 (H) 11.7 (H)      Allergies as of 3/24/2017        Reactions    Penicillins Anaphylaxis    Shellfish Containing Products     Other reaction(s): Unknown    Vancomycin Itching    Bactrim  [Sulfamethoxazole-trimethoprim] Rash      Advance Directives     An advance directive is a document which, in the event you are no longer able to make decisions for yourself, tells your healthcare team what kind of treatment you do or do not want to receive, or who you would like to make those decisions for you.  If you do not currently have an advance directive, [facility] encourages you to create one.  For more information call:  [phone]), [phone] ([phone]),  or log on to [email or web address].        Language Assistance Services     ATTENTION: Language assistance services are available, free of charge. Please call [phone].      ATENCIÓN: leona Story a  whittaker disposición servicios gratuitos de asistencia lingüística. Abdullahijuliette al [phone].     ROMAN Ý: N?u b?n nói Ti?ng Vi?t, có các d?ch v? h? tr? ngôn ng? mi?n phí dành cho b?n. G?i s? [phone].        Diabetes Discharge Instructions                                    [facility] complies with applicable Federal civil rights laws and does not discriminate on the basis of race, color, national origin, age, disability, or sex.

## 2017-03-24 NOTE — MR AVS SNAPSHOT
[facility] - Pre Op Consult  [address] [address]  Phone: [phone]                  [place]   3/24/2017 8:00 AM   Initial consult    Description:  Male : 1965   Provider:  Idalia Shipley MD   Department:  [facility] - Pre Op Consult           Reason for Visit     Pre-op Exam                To Do List           Future Appointments        Provider Department Dept Phone    3/24/2017  9:00 AM Zainab Chaidez RN [facility] [phone]    3/24/2017 1:00 PM [facility] CT2 64- LIMIT 600 LBS [facility] [phone]    3/24/2017 1:15 PM [facility] CT1 64- LIMIT 450 LBS [facility] [phone]    3/30/2017 9:40 AM DRE Baker MD [facility] [phone]      Your Future Surgeries/Procedures     2017   Surgery with Simon Gomez MD   [facility] ([facility])    [address] [address] [phone]              Goals (5 Years of Data)              6/24/16    6/10/15    2/23/15    HEMOGLOBIN A1C < 7.0   11.7  11.7  11.7      [facility]     [facility] Nurse Care Line -  Assistance  Registered nurses in the [facility] provide clinical advisement, health education, appointment booking, and other advisory services.  Call for this free service at [phone].             Medications           Message regarding Medications     Verify the changes and/or additions to your medication regime listed below are the same as discussed with your clinician today.  If any of these changes or additions are incorrect, please notify your healthcare provider.             Verify that the below list of medications is an accurate representation of the medications you are currently taking.  If none reported, the list may be blank. If incorrect, please contact your healthcare provider. Carry this list with you in case  "of emergency.           Current Medications     aspirin 81 MG Chew Take 81 mg by mouth every evening.     atorvastatin (LIPITOR) 80 MG tablet Take 1 tablet (80 mg total) by mouth once daily.    blood sugar diagnostic Strp Strips and lancets    Use before meals and bedtime    carvedilol (COREG) 12.5 MG tablet Take 1 tablet (12.5 mg total) by mouth 2 (two) times daily.    LANTUS SOLOSTAR 100 unit/mL (3 mL) InPn pen INJECT 50 UNITS SUBCUTANEOUSLY IN THE EVENING    lisinopril (PRINIVIL,ZESTRIL) 20 MG tablet Take 1 tablet (20 mg total) by mouth once daily.    metformin (GLUCOPHAGE) 1000 MG tablet Take 1 tablet (1,000 mg total) by mouth 2 (two) times daily with meals.    metronidazole (FLAGYL) 500 MG tablet Take 1 tablet (500 mg total) by mouth As instructed. Take 1 tablet at 1pm, 2pm, 11pm the day before surgery    neomycin (MYCIFRADIN) 500 mg Tab Take 2 tablets (1,000 mg total) by mouth As instructed. Take 2 tablets at 1pm, 2pm, 11pm the day before surgery    nitroGLYCERIN (NITROSTAT) 0.4 MG SL tablet Place 1 tablet (0.4 mg total) under the tongue every 5 (five) minutes as needed for Chest pain.    NOVOLOG FLEXPEN 100 unit/mL InPn pen INJECT  15 UNITS  SUBCUTANEOUSLY WITH MEALS    pen needle, diabetic 31 gauge x 3/16" Ndle Inject 1 each into the skin 3 (three) times daily before meals.           Clinical Reference Information           Your Vitals Were     BP Pulse Temp Height Weight SpO2    127/84 68 98.7 °F (37.1 °C) (Oral) 6' 2" (1.88 m) 130.3 kg (287 lb 4.2 oz) 95%    BMI                36.88 kg/m2          Blood Pressure          Most Recent Value    BP  127/84 [left and 118/76 right]      Allergies as of 3/24/2017     Penicillins    Shellfish Containing Products    Vancomycin    Bactrim  [Sulfamethoxazole-trimethoprim]      Immunizations Administered on Date of Encounter - 3/24/2017     None      Instructions       PreOp Instructions given:    -- Medication information (what to hold and what to take)   -- NPO " guidelines -- DO NOT EAT ANYTHING AFTER MIDNIGHT, INCLUDING GUM, HARD CANDY, MINTS, OR CHEWING TOBACCO.  -- Arrival place and directions given; time to be given the day before procedure by the Surgeon's Office   -- Bathing with antibacterial soap   -- Don't wear any jewelry or bring any valuables AM of surgery   -- No makeup or moisturizer to face   -- No perfume/cologne, powder, lotions or aftershave     Pt verbalized understanding.        Language Assistance Services     ATTENTION: Language assistance services are available, free of charge. Please call [phone].      ATENCIÓN: Si habla español, tiene a whittaker disposición servicios gratuitos de asistencia lingüística. Llame al [phone].     ROMAN Ý: N?u b?n nói Ti?ng Vi?t, có các d?ch v? h? tr? ngôn ng? mi?n phí dành cho b?n. G?i s? [phone].         [facility] complies with applicable Federal civil rights laws and does not discriminate on the basis of race, color, national origin, age, disability, or sex.

## 2017-03-24 NOTE — DISCHARGE INSTRUCTIONS
Your surgery has been scheduled for:__________________________________________    You should report to:  ____[facility], located on the [place] side of the first floor of the           [facility] ([phone])  ____[facility], located on the [facility] side of the            Second floor of the [facility] ([phone])  ____3rd Floor [facility] located on the [facility] side of the [facility] [phone]  Please Note   - Tell your doctor if you take Aspirin, products containing Aspirin, herbal medications  or blood thinners, such as Coumadin, Ticlid, or Plavix.  (Consult your provider regarding holding or stopping before surgery).  - Arrange for someone to drive you home following surgery.  You will not be allowed to leave the surgical facility alone or drive yourself home following sedation and anesthesia.  Before Surgery  - Stop taking all herbal medications 14days prior to surgery  - No Motrin/Advil (Ibuprofen) 7 days before surgery  - No Aleve (Naproxen) 7 days before surgery  - Stop Taking Asprin, products containing Asprin _____days before surgery  - Stop taking blood thinners_______days before surgery  - Refrain from drinking alcoholic beverages for 24hours before and after surgery  - Stop or limit smoking _________days before surgery  Night before Surgery  - DO NOT EAT OR DRINK ANYTHING AFTER MIDNIGHT, INCLUDING GUM, HARD CANDY, MINTS, OR CHEWING TOBACCO.  - Take a shower or bath (shower is recommended).  Bathe with Hibiclens soap or an antibacterial soap from the neck down.  If not supplied by your surgeon, hibiclens soap will need to be purchased over the counter in pharmacy.  Rinse soap off thoroughly.  - Shampoo your hair with your regular shampoo  The Day of Surgery  - Take another bath or shower with hibiclens or any antibacterial soap, to reduce the chance of infection.  - Take heart and blood  pressure medications with a small sip of water, as advised by the perioperative team.  - Do not take fluid pills  - You may brush your teeth and rinse your mouth, but do not swall any additional water.   - Do not apply perfumes, powder, body lotions or deodorant on the day of surgery.  - Nail polish should be removed.  - Do not wear makeup or moisturizer  - Wear comfortable clothes, such as a button front shirt and loose fitting pants.  - Leave all jewelry, including body piercings, and valuables at home.    - Bring any devices you will neeed after surgery such as crutches or canes.  - If you have sleep apnea, please bring your CPAP machine  In the event that your physical condition changes including the onset of a cold or respiratory illness, or if you have to delay or cancel your surgery, please notify your surgeon.Anesthesia: General Anesthesia  Youre due to have surgery. During surgery, youll be given medication called anesthesia. (It is also called anesthetic.) This will keep you comfortable and pain-free. Your surgeon will use general anesthesia. This sheet tells you more about it.    What Is General Anesthesia?  General anesthesia puts you into a state like deep sleep. It goes into the bloodstream (IV anesthetics), into the lungs (gas anesthetics), or both. You feel nothing during the procedure. You will not remember it. During the procedure, the anesthesia provider monitors you. He or she checks your heart rate and rhythm, blood pressure, and blood oxygen.  · IV Anesthetics  IV anesthetics are given through an IV line in your arm. Theyre often given first. This is so you are asleep before a gas anesthetic is started. Some kinds of IV anesthetics relieve pain. Others relax you. Your doctor will decide which kind is best in your case.  · Gas Anesthetics  Gas anesthetics are breathed into the lungs. They are often used to keep you asleep. They can be given through a facemask, an endotracheal tube, or a  laryngeal mask airway.  ¨ If you have a facemask, your anesthesia provider will most likely place it over your nose and mouth while youre still awake. Youll breathe oxygen through the mask as your IV anesthetic is started. Gas anesthetic may be added through the mask.  ¨ If you have an endotracheal tube or a laryngeal mask airway, it will be inserted into your throat after youre asleep.  Anesthesia Tools and Medications  You will likely have:  · IV anesthetics sent through an IV line into your bloodstream.  · Gas anesthetics breathed into your lungs, where they pass into your bloodstream.  · A pulse oximeter on the end of your finger. This measures your blood oxygen level.  · Electrocardiography leads (electrodes) on your chest. These record your heart rate and rhythm.  · A blood pressure cuff. This reads your blood pressure.  Risks and Possible Complications  General anesthesia has some risks. These include:  · Breathing problems  · Nausea and vomiting  · Sore throat or hoarseness  · Allergic reaction to the anesthetic  · Ongoing numbness (rare)  · Irregular heartbeat (rare)  · Cardiac arrest (rare)   Anesthesia Safety  · Follow all instructions you are given for how long not to eat or drink before your procedure.  · Be sure your doctor knows what medications you take. This includes over-the-counter medications, herbs, and supplements.  · Have an adult family member or friend drive you home after the procedure.  · For the first 24 hours after your surgery:  ¨ Do not drive or use heavy equipment.  ¨ Do not make important decisions or sign documents.  ¨ Avoid alcohol.  ¨ Have someone stay with you, if possible. They can watch for problems and help keep you safe.  © 6111-9366 [employer], [address] [place] [address], [place] [address]. All rights reserved. This information is not intended as a substitute for professional medical care. Always follow your healthcare professional's instructions.

## 2017-03-28 ENCOUNTER — TELEPHONE (OUTPATIENT)
Dept: ENDOCRINOLOGY | Facility: CLINIC | Age: 52
End: 2017-03-28

## 2017-03-28 ENCOUNTER — PATIENT MESSAGE (OUTPATIENT)
Dept: ENDOCRINOLOGY | Facility: CLINIC | Age: 52
End: 2017-03-28

## 2017-03-29 ENCOUNTER — PATIENT MESSAGE (OUTPATIENT)
Dept: ENDOCRINOLOGY | Facility: CLINIC | Age: 52
End: 2017-03-29

## 2017-03-30 ENCOUNTER — OFFICE VISIT (OUTPATIENT)
Dept: INTERNAL MEDICINE | Facility: CLINIC | Age: 52
End: 2017-03-30
Payer: COMMERCIAL

## 2017-03-30 ENCOUNTER — TELEPHONE (OUTPATIENT)
Dept: ENDOCRINOLOGY | Facility: CLINIC | Age: 52
End: 2017-03-30

## 2017-03-30 VITALS
TEMPERATURE: 99 F | WEIGHT: 286.19 LBS | BODY MASS INDEX: 36.73 KG/M2 | HEART RATE: 72 BPM | HEIGHT: 74 IN | SYSTOLIC BLOOD PRESSURE: 130 MMHG | DIASTOLIC BLOOD PRESSURE: 90 MMHG

## 2017-03-30 DIAGNOSIS — E11.59 HYPERTENSION ASSOCIATED WITH DIABETES: Primary | ICD-10-CM

## 2017-03-30 DIAGNOSIS — I15.2 HYPERTENSION ASSOCIATED WITH DIABETES: Primary | ICD-10-CM

## 2017-03-30 PROCEDURE — 3075F SYST BP GE 130 - 139MM HG: CPT | Mod: S$GLB,,, | Performed by: INTERNAL MEDICINE

## 2017-03-30 PROCEDURE — 99214 OFFICE O/P EST MOD 30 MIN: CPT | Mod: S$GLB,,, | Performed by: INTERNAL MEDICINE

## 2017-03-30 PROCEDURE — 1160F RVW MEDS BY RX/DR IN RCRD: CPT | Mod: S$GLB,,, | Performed by: INTERNAL MEDICINE

## 2017-03-30 PROCEDURE — 2022F DILAT RTA XM EVC RTNOPTHY: CPT | Mod: S$GLB,,, | Performed by: INTERNAL MEDICINE

## 2017-03-30 PROCEDURE — 3046F HEMOGLOBIN A1C LEVEL >9.0%: CPT | Mod: S$GLB,,, | Performed by: INTERNAL MEDICINE

## 2017-03-30 PROCEDURE — 99999 PR PBB SHADOW E&M-EST. PATIENT-LVL III: CPT | Mod: PBBFAC,,, | Performed by: INTERNAL MEDICINE

## 2017-03-30 PROCEDURE — 3060F POS MICROALBUMINURIA REV: CPT | Mod: S$GLB,,, | Performed by: INTERNAL MEDICINE

## 2017-03-30 PROCEDURE — 3080F DIAST BP >= 90 MM HG: CPT | Mod: S$GLB,,, | Performed by: INTERNAL MEDICINE

## 2017-03-30 NOTE — PROGRESS NOTES
This office note has been dictated.  HISTORY OF PRESENT ILLNESS:  This is a 51-year-old male following up on   hypertension.  He also has diabetes mellitus, which has been poorly controlled.    We saw him last in summer of 2016 and had advanced his basal insulin at that   time, but he did not execute that.  He has an appointment tomorrow with   Endocrinology regarding his diabetes.  The patient has a partial colectomy   planned for next week for a localized colon cancer.  He has seen the   Preoperative Medicine for preop evaluation.  He currently feels well without   chest pain, palpitations, syncope, cough, shortness of breath.  He is now taking   medications as directed.    CURRENT MEDICATIONS:  All noted and reviewed in the electronic medical record   medication list.    REVIEW OF SYSTEMS:  CONSTITUTIONAL:  No fever, no chills, no generalized body aches.  CARDIOVASCULAR:  No chest pain, no palpitations, no syncope.  GASTROINTESTINAL:  No nausea, vomiting, abdominal pain, diarrhea, or changes in   bowel habits.  NEUROLOGIC:  No headaches or focal neurological symptoms of a new nature.    PAST MEDICAL HISTORY, PAST SURGICAL HISTORY, FAMILY AND SOCIAL HISTORY:  All   noted and reviewed in the electronic medical record history sections.    PHYSICAL EXAMINATION:  GENERAL:  Alert male in no acute distress.  VITAL SIGNS:  Blood pressure taken manually by this examiner is 130/90.  Other   vital signs noted and reviewed.  HEENT:  Normocephalic.  NECK:  Supple, no masses, no thyromegaly.  CARDIOVASCULAR:  Regular rate and rhythm.  No significant murmur.  Carotids are   full bilaterally without bruits.  Trace pretibial edema.  LUNGS:  Clear to auscultation and normal to percussion.    DATA:  Recent laboratory data noted and reviewed.    IMPRESSION:  1.  Hypertension associated with diabetes, borderline diastolic reading today.  2.  Diabetes mellitus with associated cardiovascular complications, has been   poorly controlled.   He does have a followup planned with Endocrinology scheduled   for tomorrow.  3.  Dyslipidemia, on high-dose statin therapy.  4.  Obesity with associated comorbidities.  5.  Colon cancer, scheduled for partial colectomy next week.    PLAN:  No adjustment in blood pressure regimen at this time until postoperative   situation has stabilized.  We will see him back in three months for followup in   that regard.      PB/HN  dd: 03/30/2017 10:14:14 (CDT)  td: 03/31/2017 03:14:56 (CDT)  Doc ID   #[id number]  Job ID #[id number]    CC:

## 2017-03-31 ENCOUNTER — OFFICE VISIT (OUTPATIENT)
Dept: ENDOCRINOLOGY | Facility: CLINIC | Age: 52
End: 2017-03-31
Payer: COMMERCIAL

## 2017-03-31 ENCOUNTER — HOSPITAL ENCOUNTER (OUTPATIENT)
Dept: RADIOLOGY | Facility: HOSPITAL | Age: 52
Discharge: HOME | End: 2017-03-31
Attending: COLON & RECTAL SURGERY
Payer: COMMERCIAL

## 2017-03-31 VITALS
WEIGHT: 286 LBS | HEIGHT: 74 IN | HEART RATE: 72 BPM | SYSTOLIC BLOOD PRESSURE: 148 MMHG | DIASTOLIC BLOOD PRESSURE: 90 MMHG | BODY MASS INDEX: 36.7 KG/M2

## 2017-03-31 DIAGNOSIS — E11.319 TYPE 2 DIABETES MELLITUS WITH RETINOPATHY OF BOTH EYES, WITH LONG-TERM CURRENT USE OF INSULIN, MACULAR EDEMA PRESENCE UNSPECIFIED, UNSPECIFIED RETINOPATHY SEVERITY: ICD-10-CM

## 2017-03-31 DIAGNOSIS — Z71.9 HEALTH COUNSELING: ICD-10-CM

## 2017-03-31 DIAGNOSIS — E11.51 TYPE 2 DIABETES MELLITUS WITH DIABETIC PERIPHERAL ANGIOPATHY WITHOUT GANGRENE, WITH LONG-TERM CURRENT USE OF INSULIN: ICD-10-CM

## 2017-03-31 DIAGNOSIS — C18.7 MALIGNANT NEOPLASM OF SIGMOID COLON: ICD-10-CM

## 2017-03-31 DIAGNOSIS — Z79.4 TYPE 2 DIABETES MELLITUS WITH DIABETIC POLYNEUROPATHY, WITH LONG-TERM CURRENT USE OF INSULIN: ICD-10-CM

## 2017-03-31 DIAGNOSIS — E11.65 TYPE 2 DIABETES MELLITUS WITH HYPERGLYCEMIA, WITH LONG-TERM CURRENT USE OF INSULIN: Primary | ICD-10-CM

## 2017-03-31 DIAGNOSIS — Z79.4 TYPE 2 DIABETES MELLITUS WITH HYPERGLYCEMIA, WITH LONG-TERM CURRENT USE OF INSULIN: Primary | ICD-10-CM

## 2017-03-31 DIAGNOSIS — R80.9 MICROALBUMINURIA: ICD-10-CM

## 2017-03-31 DIAGNOSIS — E11.59 HYPERTENSION ASSOCIATED WITH DIABETES: ICD-10-CM

## 2017-03-31 DIAGNOSIS — I15.2 HYPERTENSION ASSOCIATED WITH DIABETES: ICD-10-CM

## 2017-03-31 DIAGNOSIS — Z79.4 TYPE 2 DIABETES MELLITUS WITH DIABETIC PERIPHERAL ANGIOPATHY WITHOUT GANGRENE, WITH LONG-TERM CURRENT USE OF INSULIN: ICD-10-CM

## 2017-03-31 DIAGNOSIS — Z79.4 TYPE 2 DIABETES MELLITUS WITH RETINOPATHY OF BOTH EYES, WITH LONG-TERM CURRENT USE OF INSULIN, MACULAR EDEMA PRESENCE UNSPECIFIED, UNSPECIFIED RETINOPATHY SEVERITY: ICD-10-CM

## 2017-03-31 DIAGNOSIS — E78.5 DYSLIPIDEMIA: ICD-10-CM

## 2017-03-31 DIAGNOSIS — I21.4 NSTEMI (NON-ST ELEVATED MYOCARDIAL INFARCTION): ICD-10-CM

## 2017-03-31 DIAGNOSIS — E11.42 TYPE 2 DIABETES MELLITUS WITH DIABETIC POLYNEUROPATHY, WITH LONG-TERM CURRENT USE OF INSULIN: ICD-10-CM

## 2017-03-31 LAB
CREAT UR-MCNC: 73 MG/DL
MICROALBUMIN UR DL<=1MG/L-MCNC: 206 UG/ML
MICROALBUMIN/CREATININE RATIO: 282.2 UG/MG

## 2017-03-31 PROCEDURE — 2022F DILAT RTA XM EVC RTNOPTHY: CPT | Mod: S$GLB,,, | Performed by: NURSE PRACTITIONER

## 2017-03-31 PROCEDURE — 82570 ASSAY OF URINE CREATININE: CPT

## 2017-03-31 PROCEDURE — 3060F POS MICROALBUMINURIA REV: CPT | Mod: S$GLB,,, | Performed by: NURSE PRACTITIONER

## 2017-03-31 PROCEDURE — 99215 OFFICE O/P EST HI 40 MIN: CPT | Mod: S$GLB,,, | Performed by: NURSE PRACTITIONER

## 2017-03-31 PROCEDURE — 71260 CT THORAX DX C+: CPT | Mod: 26,,, | Performed by: RADIOLOGY

## 2017-03-31 PROCEDURE — 74177 CT ABD & PELVIS W/CONTRAST: CPT | Mod: TC

## 2017-03-31 PROCEDURE — 3080F DIAST BP >= 90 MM HG: CPT | Mod: S$GLB,,, | Performed by: NURSE PRACTITIONER

## 2017-03-31 PROCEDURE — 25500020 PHARM REV CODE 255: Performed by: COLON & RECTAL SURGERY

## 2017-03-31 PROCEDURE — 74177 CT ABD & PELVIS W/CONTRAST: CPT | Mod: 26,,, | Performed by: RADIOLOGY

## 2017-03-31 PROCEDURE — 71260 CT THORAX DX C+: CPT | Mod: TC

## 2017-03-31 PROCEDURE — 99999 PR PBB SHADOW E&M-EST. PATIENT-LVL IV: CPT | Mod: PBBFAC,,, | Performed by: NURSE PRACTITIONER

## 2017-03-31 PROCEDURE — 3046F HEMOGLOBIN A1C LEVEL >9.0%: CPT | Mod: S$GLB,,, | Performed by: NURSE PRACTITIONER

## 2017-03-31 PROCEDURE — 3077F SYST BP >= 140 MM HG: CPT | Mod: S$GLB,,, | Performed by: NURSE PRACTITIONER

## 2017-03-31 PROCEDURE — 1160F RVW MEDS BY RX/DR IN RCRD: CPT | Mod: S$GLB,,, | Performed by: NURSE PRACTITIONER

## 2017-03-31 RX ORDER — INSULIN ASPART 100 [IU]/ML
INJECTION, SOLUTION INTRAVENOUS; SUBCUTANEOUS
Qty: 2 BOX | Refills: 3 | Status: SHIPPED | OUTPATIENT
Start: 2017-03-31 | End: 2017-07-05

## 2017-03-31 RX ORDER — INSULIN GLARGINE 100 [IU]/ML
INJECTION, SOLUTION SUBCUTANEOUS
Qty: 2 BOX | Refills: 3 | Status: SHIPPED | OUTPATIENT
Start: 2017-03-31 | End: 2017-07-05 | Stop reason: SDUPTHER

## 2017-03-31 RX ORDER — METFORMIN HYDROCHLORIDE 1000 MG/1
1000 TABLET ORAL 2 TIMES DAILY WITH MEALS
Qty: 60 TABLET | Refills: 6 | Status: SHIPPED | OUTPATIENT
Start: 2017-03-31 | End: 2018-02-22 | Stop reason: SDUPTHER

## 2017-03-31 RX ADMIN — IOHEXOL 15 ML: 350 INJECTION, SOLUTION INTRAVENOUS at 11:03

## 2017-03-31 RX ADMIN — IOHEXOL 100 ML: 350 INJECTION, SOLUTION INTRAVENOUS at 01:03

## 2017-03-31 RX ADMIN — IOHEXOL 15 ML: 350 INJECTION, SOLUTION INTRAVENOUS at 12:03

## 2017-03-31 NOTE — MR AVS SNAPSHOT
[facility] - Endocrinology  [address] [address]  Phone: [phone]                  Billy Hammandreia Miguel   3/31/2017 1:30 PM   Office Visit    Description:  Male : 1965   Provider:  CHETNA Powell   Department:  [facility] - Endocrinology           Reason for Visit     Diabetes Mellitus           Diagnoses this Visit        Comments    Type 2 diabetes mellitus with hyperglycemia, with long-term current use of insulin    -  Primary     Type 2 diabetes mellitus with retinopathy of both eyes, with long-term current use of insulin, macular edema presence unspecified, unspecified retinopathy severity         Type 2 diabetes mellitus with diabetic peripheral angiopathy without gangrene, with long-term current use of insulin         Type 2 diabetes mellitus with diabetic polyneuropathy, with long-term current use of insulin         Type 2 diabetes mellitus without complication, with long-term current use of insulin                To Do List           Future Appointments        Provider Department Dept Phone    2017 8:30 AM Will Samuel MD [facility] - Vascular Surgery [phone]    2017 8:30 AM Simon Gray OD [facility] [place] - Optometry [phone]    2017 7:30 AM Hansa Robertson, JAYNEM [facility] - Podiatry [phone]    2017 8:00 AM Terra King RD [facility] - Diabetes Management [phone]    2017 7:00 AM LAB, [place] - Laboratory [phone]      Your Future Surgeries/Procedures     2017   Surgery with Simon Gomez MD   [facility] ([facility])    [address] [address] [phone]              Goals (5 Years of Data)              3/24/17    6/24/16    6/10/15    HEMOGLOBIN A1C < 7.0   12.1  11.7  11.7      Follow-Up and Disposition     Return in about 3 months (around 2017).       These Medications        Disp Refills Start End     insulin glargine (LANTUS SOLOSTAR) 100 unit/mL (3 mL) InPn pen 2 Box 3 3/31/2017     INJECT 52 UNITS SUBCUTANEOUSLY IN THE EVENING    Pharmacy: [address] Ph #: [phone]       insulin aspart (NOVOLOG FLEXPEN) 100 unit/mL InPn pen 2 Box 3 3/31/2017     Inject 20 units with meals + correction scale goal 150, ISF 25- TDD 90    Pharmacy: [address] Ph #: [phone]       metformin (GLUCOPHAGE) 1000 MG tablet 60 tablet 6 3/31/2017 3/31/2018    Take 1 tablet (1,000 mg total) by mouth 2 (two) times daily with meals. - Oral    Pharmacy: [address] Ph #: [phone]         [facility]     [facility] Nurse Care Line - 24/7 Assistance  Unless otherwise directed by your provider, please contact [facility], our nurse care line that is available for 24/7 assistance.     Registered nurses in the [facility] provide: appointment scheduling, clinical advisement, health education, and other advisory services.  Call: [phone] (toll free)               Medications           Message regarding Medications     Verify the changes and/or additions to your medication regime listed below are the same as discussed with your clinician today.  If any of these changes or additions are incorrect, please notify your healthcare provider.        CHANGE how you are taking these medications     Start Taking Instead of    insulin glargine (LANTUS SOLOSTAR) 100 unit/mL (3 mL) InPn pen LANTUS SOLOSTAR 100 unit/mL (3 mL) InPn pen    Dosage:  INJECT 52 UNITS SUBCUTANEOUSLY IN THE EVENING Dosage:  INJECT 50 UNITS SUBCUTANEOUSLY IN THE EVENING    Reason for Change:  Reorder     insulin aspart (NOVOLOG FLEXPEN) 100 unit/mL InPn pen NOVOLOG FLEXPEN 100 unit/mL InPn pen    Dosage:  Inject 20 units with meals + correction scale goal 150, ISF 25- TDD 90 Dosage:  " INJECT  15 UNITS  SUBCUTANEOUSLY WITH MEALS    Reason for Change:  Reorder            Verify that the below list of medications is an accurate representation of the medications you are currently taking.  If none reported, the list may be blank. If incorrect, please contact your healthcare provider. Carry this list with you in case of emergency.           Current Medications     aspirin 81 MG Chew Take 81 mg by mouth every evening. swallows    atorvastatin (LIPITOR) 80 MG tablet Take 1 tablet (80 mg total) by mouth once daily.    blood sugar diagnostic Strp Strips and lancets    Use before meals and bedtime    carvedilol (COREG) 12.5 MG tablet Take 1 tablet (12.5 mg total) by mouth 2 (two) times daily.    insulin aspart (NOVOLOG FLEXPEN) 100 unit/mL InPn pen Inject 20 units with meals + correction scale goal 150, ISF 25- TDD 90    insulin glargine (LANTUS SOLOSTAR) 100 unit/mL (3 mL) InPn pen INJECT 52 UNITS SUBCUTANEOUSLY IN THE EVENING    lisinopril (PRINIVIL,ZESTRIL) 20 MG tablet Take 1 tablet (20 mg total) by mouth once daily.    metformin (GLUCOPHAGE) 1000 MG tablet Take 1 tablet (1,000 mg total) by mouth 2 (two) times daily with meals.    nitroGLYCERIN (NITROSTAT) 0.4 MG SL tablet Place 1 tablet (0.4 mg total) under the tongue every 5 (five) minutes as needed for Chest pain.    pen needle, diabetic 31 gauge x 3/16" Ndle Inject 1 each into the skin 3 (three) times daily before meals.           Clinical Reference Information           Your Vitals Were     BP Pulse Height Weight BMI    148/90 72 6' 2" (1.88 m) 129.7 kg (286 lb) 36.72 kg/m2      Blood Pressure          Most Recent Value    BP  (!)  148/90      Allergies as of 3/31/2017     Penicillins    Shellfish Containing Products    Vancomycin    Bactrim  [Sulfamethoxazole-trimethoprim]      Immunizations Administered on Date of Encounter - 3/31/2017     None      Orders Placed During Today's Visit      Normal Orders This Visit    Ambulatory consult to " Ophthalmology     Ambulatory consult to Podiatry     Ambulatory Referral to Diabetes Education     Ambulatory Referral to Vascular Medicine     Microalbumin/creatinine urine ratio     Future Labs/Procedures Expected by Expires    Comprehensive metabolic panel  3/31/2017 5/30/2018    Hemoglobin A1c  3/31/2017 5/30/2018    Lipid panel  3/31/2017 5/30/2018    TSH  3/31/2017 5/30/2018      Language Assistance Services     ATTENTION: Language assistance services are available, free of charge. Please call [phone].      ATENCIÓN: Si habla socorro, tiene a whittaker disposición servicios gratuitos de asistencia lingüística. Llame al [phone].     CHÚ Ý: N?u b?n nói Ti?ng Vi?t, có các d?ch v? h? tr? ngôn ng? mi?n phí dành cho b?n. G?i s? [phone].         [facility] complies with applicable Federal civil rights laws and does not discriminate on the basis of race, color, national origin, age, disability, or sex.

## 2017-03-31 NOTE — PROGRESS NOTES
CC: Billy Rivera arrives today for management of Type 2 DM and review of chronic medical conditions.      HPI: Mr. Billy Rivera is a 51 y.o. White male who was diagnosed with Type 2 DM around age 39. +family hx in mother and father, and both paternal and maternal grandparents. He was diagnosed based on lab work, though he remembers being fatigued at the time of diagnosis. Treatment began with metformin and insulin was added ~2012.     Chronic conditions include: CAD, HTN, HLD. He experienced a STEMI with 90% blockage to RAD with stent placement in May 2013.    He was diagnosed with colon cancer 1 month ago. He is scheduled to have a colon resection where they plan to remove 1 foot of his colon next Wednesday, April 5, 2017.     He was previously seen in [facility] by TYRESE Mckoy NP. Last seen in June 2015. He was encouraged to come back to endocrine for DM management per Dr. Shipley prior to colon surgery next week.     T2DM is uncontrolled with A1c 11- 12% over the last 2 years.     DIABETES COMPLICATIONS: retinopathy and cardiovascular disease Hx of STEMI in 2013.     HOSPITALIZATIONS FOR DIABETES OR RELATED COMPLICATIONS:  No    CURRENT A1C:    Hemoglobin A1C   Date Value Ref Range Status   03/24/2017 12.1 (H) 4.5 - 6.2 % Final     Comment:     According to ADA guidelines, hemoglobin A1C <7.0% represents  optimal control in non-pregnant diabetic patients.  Different  metrics may apply to specific populations.   Standards of Medical Care in Diabetes - 2016.  For the purpose of screening for the presence of diabetes:  <5.7%     Consistent with the absence of diabetes  5.7-6.4%  Consistent with increasing risk for diabetes   (prediabetes)  >or=6.5%  Consistent with diabetes  Currently no consensus exists for use of hemoglobin A1C  for diagnosis of diabetes for children.     06/24/2016 11.7 (H) 4.5 - 6.2 % Final   06/10/2015 11.7 (H) 4.5 - 6.2 % Final       GOAL A1C: < 7.0%     DM MEDICATIONS  "USED IN THE PAST: Lantus, Novolog, and Metformin     CURRENT DIABETIC MEDS: Metformin 1000 mg BID, Lantus 52 units nightly (between 10PM-12AM). Novolog 20 units TID AC.   Uses Vials or Pens: Pens  Type of Glucose Meter: [employer] Brand Meter.     ANY DIFFICULTY AFFORDING YOUR CURRENT MEDICATION REGIMEN?  No      STANDARDS of CARE:  Statin:  Yes - Lipitor 80 mg  ACEI or ARB:  Yes - Lisinopril 20 mg   ASA:  Yes - 81 mg   Last eye exam: 2013-+ Background     Last Podiatry Exam: None   Microalbumin/Creatinine ratio:  Lab Results   Component Value Date    MICALBCREAT 105.9 (H) 06/15/2015       BG readings are checked  1-2x/day. He did not bring his meter or logs with him today.  Per oral recall he reports that he is checking at various times of day,"whenever he can"  He reports when he does check his BG, his readings range from~ 150-230.     Hypoglycemia: No    Skipped/missed glycemic medications: Yes- missing Lantus 1-2x/month and Novolog a couple of times per week.     Prandial injections taken with meals: Yes    Physical Activity: No formal exercise.     Skipping meals and/or snacking: eating sandwiches for a lot of meals. Reports he tries to eat one hot-cooked meal from the grocery store per day.      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 Sometimes only sleeping 2-3 hours at night.     MEDICATIONS, ALLERGIES, LABS, VS's, MEDICAL, SURGICAL, SOCIAL, AND FAMILY HISTORY reviewed and updated in the appropriate sections during this encounter    ROS  Constitutional: Negative for weight change.   Endocrine:  Denies polyuria, polydipsia, nocturia.  HENT: Denies neck swelling, lumps, hoarseness or trouble swallowing. Denies any personal or family history of thyroid cancer.    Eyes: Negative for visual disturbance. Wears glasses.    Respiratory: Negative for cough or shortness or breath.  Cardiovascular: Negative for chest pain or claudication.   Gastrointestinal: Negative for " "nausea, diarrhea, vomiting, bloating.  No history of pancreatitis, pancreatic cancer, or gastroparesis.  Genitourinary: Negative for urgency, frequency, frequent urinary tract infections.  Skin: Denies prolonged wound healing.  Neurological: Negative for syncope, numbness/burning of extremities.  Psychiatric/Behavioral: Negative for depression or anxiety + stress      Vital Signs  BP (!) 148/90  Pulse 72  Ht 6' 2" (1.88 m)  Wt 129.7 kg (286 lb)  BMI 36.72 kg/m2        Chemistry        Component Value Date/Time     (L) 03/13/2017 1450    K 4.1 03/13/2017 1450    CL 98 03/13/2017 1450    CO2 27 03/13/2017 1450    BUN 21 (H) 03/13/2017 1450    CREATININE 1.0 03/13/2017 1450     (H) 03/13/2017 1450        Component Value Date/Time    CALCIUM 8.9 03/13/2017 1450    ALKPHOS 106 03/13/2017 1450    AST 23 03/13/2017 1450    ALT 35 03/13/2017 1450    BILITOT 0.7 03/13/2017 1450          Lab Results   Component Value Date    CHOL 148 06/24/2016    CHOL 153 06/10/2015    CHOL 159 08/14/2014     Lab Results   Component Value Date    HDL 40 06/24/2016    HDL 40 06/10/2015    HDL 34 (L) 08/14/2014     Lab Results   Component Value Date    LDLCALC 80.4 06/24/2016    LDLCALC 80.0 06/10/2015    LDLCALC 92.8 08/14/2014     Lab Results   Component Value Date    TRIG 138 06/24/2016    TRIG 165 (H) 06/10/2015    TRIG 161 (H) 08/14/2014     Lab Results   Component Value Date    CHOLHDL 27.0 06/24/2016    CHOLHDL 26.1 06/10/2015    CHOLHDL 21.4 08/14/2014       Lab Results   Component Value Date    TSH 2.055 06/24/2016       Lab Results   Component Value Date    MICALBCREAT 105.9 (H) 06/15/2015       No results found for: [id number]      PHYSICAL EXAMINATION  Constitutional: Well developed, well nourished, NAD.   Psych: AAOx3, appropriate mood and affect, pleasant expression, conversant, appears relaxed, well groomed.   Eyes: Conjunctiva and corneas clear.  Neck: Supple, trachea midline, FROM, no thyromegaly or nodules, " carotid pulses strong, no bruits.  Respiratory: Resp even and unlabored, CTA bilateral.  Cardiac: RRR, S1, S2 heard, no murmurs; radial pulses +2 bilaterally.   Abdomen: Soft, non-tender, non-distended; +BSs x  4.  Vascular: Same temperature peripherally to centrally, DP pulses +2 bilaterally, + 2 pitting BLE edema.   Neuro: Gait steady.   Skin: Normal skin turgor. Skin warm and dry. No acanthosis nigracans observed. Insulin injection sites with no complications. + dermopathic changes noted to BLE with shiny, taught appearance. + discoloration to bilateral feet/toes   Feet: No pressure areas, blisters, ulcers. +calluses and dryness noted to bilateral feet. Footwear appropriate, nails discolored and thickened to bilateral great toes.     Diabetes Foot Exam:   Decrease vibratory response to 128 Hz tuning fork bilaterally.     Protective Sensation (w/ 10 gram monofilament):  Right: Decreased  Left: Decreased    Visual Inspection:  Callus -  Bilateral, Dry Skin -  Bilateral and Onychomycosis -  Bilateral    Pedal Pulses:   Right: Diminshed  Left: Diminshed    Posterior tibialis:   Right:Diminshed  Left: Diminshed          Assessment/Plan    1. Type 2 diabetes mellitus with hyperglycemia, with long-term current use of insulin   Uncontrolled, A1c above goal.   Discussed diagnosis of DM, progression of disease, long term complications and tx options.  Reviewed A1c and BG goals.   Avoiding GLP1, DPP4- due to recent dx of colon CA, unsure of metastasis.   Avoiding SGLT2- plans to have colon resection, concerns for post-operative dehydration.     Difficult to safely titrate insulin due to lack of information without BG logs or meter for review.   Also, suspect he is missing more doses of insulin than reported.   For now, continue current regimen- Metformin 1000 mg BID, Lantus 52 units nightly, and Novolog 20 units TID.   Urged dose adherence, SMBG 4x/day and urged him to record BG readings over the weekend to fax/or bring  logs to clinic on Monday for insulin titration. Logs and fax # provided.   He may benefit from VGO in the near future given missed doses, but because of upcoming surgery, he needs better BG now. VGO device would require insurance query and additional appointment with DM ed for training.     Reviewed DM diet, low CHO snack options, and hypoglycemia s/s and treatment.   Discussed the importance of SBGM and medication compliance as the foundation for DM management.     DM education- comprehensive review, MNT, and discussion of correction scale coverage, goal 150, ISF 25.     Referrals placed to opthalmology for annual exam, Podiatry for onychomycosis, and vascular medicine- discoloration/pigmentation changes to bilateral feet and BLE.       Urine MAC today.        2. Type 2 diabetes mellitus with retinopathy of both eyes, with long-term current use of insulin, macular edema presence unspecified, unspecified retinopathy severity   Improve BG readings.   Discussed annual eye exams- referral placed.        3. Type 2 diabetes mellitus with diabetic peripheral angiopathy without gangrene, with long-term current use of insulin   Improve BG readings.   Referral placed to vascular med.      4. Type 2 diabetes mellitus with diabetic polyneuropathy, with long-term current use of insulin   Improved BG readings.   Reviewed DM foot care.   Referral placed to podiatry.        5. Microalbuminuria - Improve BG readings. On ACEi. Monitor.        6. Dyslipidemia - LDL goal <70 due to cardiac hx. LDL above goal. On Lipitor 80 mg. LFTs WNL.  May consider switching to Crestor or adding Zeita at RTC.        7. Hypertension associated with diabetes - BP goal < 140/90. BP above goal today. Encouraged to monitor and record BP at home. May increase Lisinopril to 40 mg at RTC if BP remains above goal.        8. NSTEMI May 2013 - peak troponin 0.22 - Avoid hypoglycemia. On ASA.        9. Obesity, Class II, BMI 35-39.9, with comorbidity - Body  mass index is 36.72 kg/(m^2). Discussed the DM diet and exercise. Increases insulin resistance.      10. Health Counseling- Time spent 45 minutes with > 50% on counseling as noted above.           FOLLOW UP  Return in about 3 months (around 6/30/2017).     Orders Placed This Encounter   Procedures    Hemoglobin A1c     Standing Status:   Future     Standing Expiration Date:   5/30/2018    Microalbumin/creatinine urine ratio     Order Specific Question:   Specimen Source     Answer:   Urine    Lipid panel     Standing Status:   Future     Standing Expiration Date:   5/30/2018    Comprehensive metabolic panel     Standing Status:   Future     Standing Expiration Date:   5/30/2018    TSH     Standing Status:   Future     Standing Expiration Date:   5/30/2018    Ambulatory consult to Ophthalmology     Referral Priority:   Routine     Referral Type:   Consultation     Referral Reason:   Specialty Services Required     Requested Specialty:   Ophthalmology     Number of Visits Requested:   1    Ambulatory consult to Podiatry     Referral Priority:   Routine     Referral Type:   Consultation     Referral Reason:   Specialty Services Required     Requested Specialty:   Podiatry     Number of Visits Requested:   1    Ambulatory Referral to Vascular Medicine     Referral Priority:   Routine     Referral Type:   Consultation     Referral Reason:   Specialty Services Required     Requested Specialty:   Vascular Medicine     Number of Visits Requested:   1    Ambulatory Referral to Diabetes Education     Referral Priority:   Routine     Referral Type:   Consultation     Referral Reason:   Specialty Services Required     Requested Specialty:   Diabetes     Number of Visits Requested:   1       Patient was seen by MORIS Green NP.

## 2017-04-02 PROBLEM — Z79.4 TYPE 2 DIABETES MELLITUS WITH DIABETIC PERIPHERAL ANGIOPATHY WITHOUT GANGRENE, WITH LONG-TERM CURRENT USE OF INSULIN: Status: ACTIVE | Noted: 2017-04-02

## 2017-04-02 PROBLEM — E11.51 TYPE 2 DIABETES MELLITUS WITH DIABETIC PERIPHERAL ANGIOPATHY WITHOUT GANGRENE, WITH LONG-TERM CURRENT USE OF INSULIN: Status: ACTIVE | Noted: 2017-04-02

## 2017-04-02 PROBLEM — Z79.4 TYPE 2 DIABETES MELLITUS WITH DIABETIC POLYNEUROPATHY, WITH LONG-TERM CURRENT USE OF INSULIN: Status: ACTIVE | Noted: 2017-04-02

## 2017-04-02 PROBLEM — E11.42 TYPE 2 DIABETES MELLITUS WITH DIABETIC POLYNEUROPATHY, WITH LONG-TERM CURRENT USE OF INSULIN: Status: ACTIVE | Noted: 2017-04-02

## 2017-04-03 ENCOUNTER — TELEPHONE (OUTPATIENT)
Dept: ENDOCRINOLOGY | Facility: HOSPITAL | Age: 52
End: 2017-04-03

## 2017-04-03 NOTE — TELEPHONE ENCOUNTER
Called patient to check on BG readings. He was unable to bring logs today for review.    Reports his is checking his BG  TID (AM, afternoon(prelunch), bedtime). He reports all the readings are 180-190. He denies missed doses of insulin. He is currently taking Lantus 52 units in the evening and Novolog 20 units AC.     He reports tomorrow he will be allowed to have sugar-free clear liquids starting tomorrow AM at 0600 and then will be NPO after MN and surgery is scheduled for Wednesday.   Instructed him to cut back on his Lantus by 25% to 39 units tonight and the following night before surgery. Hold Novolog while on sugar-free clears and NPO.   Reviewed hypoglycemia s/s and management.  Call post surgery for dose adjustments.

## 2017-04-04 ENCOUNTER — TELEPHONE (OUTPATIENT)
Dept: SURGERY | Facility: CLINIC | Age: 52
End: 2017-04-04

## 2017-04-04 NOTE — TELEPHONE ENCOUNTER
----- Message from Zainab Chaidez RN sent at 4/3/2017  2:09 PM CDT -----  Dr Gomez,  Pt's last A1C on 3/24/17 was 12.1; Dr Shipley has been following the pt with some adjustment in insulin dosage and an endocrine appt completed on 3/31/17. Please refer to Dr Shipley's note for recommendations.    Thanks,  Zainab Chaidez RN  Clinical Coordinator  [facility]  Ext [phone]  4/3/17 1106

## 2017-04-04 NOTE — TELEPHONE ENCOUNTER
----- Message from Marvin Arita sent at 4/4/2017  9:08 AM CDT -----  Contact: [facility] Yvonne:[phone]  Yvonne called with [facility] and states the pt added a new insurance and it will pend for 14 days and the pt is having surgery on tomorrow.

## 2017-04-05 ENCOUNTER — ANESTHESIA (OUTPATIENT)
Dept: SURGERY | Facility: HOSPITAL | Age: 52
DRG: 331 | End: 2017-04-05
Payer: COMMERCIAL

## 2017-04-05 ENCOUNTER — HOSPITAL ENCOUNTER (INPATIENT)
Facility: HOSPITAL | Age: 52
LOS: 3 days | Discharge: HOME | DRG: 331 | End: 2017-04-08
Attending: COLON & RECTAL SURGERY | Admitting: COLON & RECTAL SURGERY
Payer: COMMERCIAL

## 2017-04-05 ENCOUNTER — SURGERY (OUTPATIENT)
Age: 52
End: 2017-04-05

## 2017-04-05 DIAGNOSIS — I25.10 CORONARY ARTERY DISEASE INVOLVING NATIVE CORONARY ARTERY OF NATIVE HEART WITHOUT ANGINA PECTORIS: ICD-10-CM

## 2017-04-05 DIAGNOSIS — E11.3299 BDR (BACKGROUND DIABETIC RETINOPATHY): ICD-10-CM

## 2017-04-05 DIAGNOSIS — I73.9 PVD (PERIPHERAL VASCULAR DISEASE): ICD-10-CM

## 2017-04-05 DIAGNOSIS — E11.51 TYPE II OR UNSPECIFIED TYPE DIABETES MELLITUS WITH PERIPHERAL CIRCULATORY DISORDERS, UNCONTROLLED(250.72): ICD-10-CM

## 2017-04-05 DIAGNOSIS — I10 ESSENTIAL HYPERTENSION: ICD-10-CM

## 2017-04-05 DIAGNOSIS — C18.9 COLON CANCER: Primary | ICD-10-CM

## 2017-04-05 DIAGNOSIS — E78.5 DYSLIPIDEMIA: ICD-10-CM

## 2017-04-05 DIAGNOSIS — E11.39 TYPE II OR UNSPECIFIED TYPE DIABETES MELLITUS WITH OPHTHALMIC MANIFESTATIONS, UNCONTROLLED(250.52): ICD-10-CM

## 2017-04-05 DIAGNOSIS — R80.9 MICROALBUMINURIA: ICD-10-CM

## 2017-04-05 DIAGNOSIS — E11.65 TYPE II OR UNSPECIFIED TYPE DIABETES MELLITUS WITH PERIPHERAL CIRCULATORY DISORDERS, UNCONTROLLED(250.72): ICD-10-CM

## 2017-04-05 DIAGNOSIS — I21.4 NSTEMI (NON-ST ELEVATED MYOCARDIAL INFARCTION): ICD-10-CM

## 2017-04-05 DIAGNOSIS — G47.30 SLEEP APNEA, UNSPECIFIED TYPE: ICD-10-CM

## 2017-04-05 DIAGNOSIS — Z13.9 SCREENING: ICD-10-CM

## 2017-04-05 DIAGNOSIS — E11.65 TYPE II OR UNSPECIFIED TYPE DIABETES MELLITUS WITH OPHTHALMIC MANIFESTATIONS, UNCONTROLLED(250.52): ICD-10-CM

## 2017-04-05 LAB
ABO + RH BLD: NORMAL
BLD GP AB SCN CELLS X3 SERPL QL: NORMAL
POCT GLUCOSE: 198 MG/DL (ref 70–110)
POCT GLUCOSE: 226 MG/DL (ref 70–110)

## 2017-04-05 PROCEDURE — 88309 TISSUE EXAM BY PATHOLOGIST: CPT | Mod: 26,,, | Performed by: PATHOLOGY

## 2017-04-05 PROCEDURE — 25000003 PHARM REV CODE 250: Performed by: SURGERY

## 2017-04-05 PROCEDURE — 63600175 PHARM REV CODE 636 W HCPCS: Performed by: SURGERY

## 2017-04-05 PROCEDURE — 63600175 PHARM REV CODE 636 W HCPCS: Performed by: NURSE ANESTHETIST, CERTIFIED REGISTERED

## 2017-04-05 PROCEDURE — 63600175 PHARM REV CODE 636 W HCPCS: Performed by: NURSE PRACTITIONER

## 2017-04-05 PROCEDURE — 88302 TISSUE EXAM BY PATHOLOGIST: CPT | Performed by: PATHOLOGY

## 2017-04-05 PROCEDURE — 88302 TISSUE EXAM BY PATHOLOGIST: CPT | Mod: 26,,, | Performed by: PATHOLOGY

## 2017-04-05 PROCEDURE — 25000003 PHARM REV CODE 250: Performed by: NURSE PRACTITIONER

## 2017-04-05 PROCEDURE — 25000003 PHARM REV CODE 250: Performed by: NURSE ANESTHETIST, CERTIFIED REGISTERED

## 2017-04-05 PROCEDURE — 25000003 PHARM REV CODE 250: Performed by: COLON & RECTAL SURGERY

## 2017-04-05 PROCEDURE — 44213 LAP MOBIL SPLENIC FL ADD-ON: CPT | Mod: ,,, | Performed by: COLON & RECTAL SURGERY

## 2017-04-05 PROCEDURE — D9220A PRA ANESTHESIA: Mod: ,,, | Performed by: ANESTHESIOLOGY

## 2017-04-05 PROCEDURE — 25000003 PHARM REV CODE 250: Performed by: ANESTHESIOLOGY

## 2017-04-05 PROCEDURE — 36000710: Performed by: COLON & RECTAL SURGERY

## 2017-04-05 PROCEDURE — 36000711: Performed by: COLON & RECTAL SURGERY

## 2017-04-05 PROCEDURE — 37000009 HC ANESTHESIA EA ADD 15 MINS: Performed by: COLON & RECTAL SURGERY

## 2017-04-05 PROCEDURE — 86850 RBC ANTIBODY SCREEN: CPT

## 2017-04-05 PROCEDURE — 27201423 OPTIME MED/SURG SUP & DEVICES STERILE SUPPLY: Performed by: COLON & RECTAL SURGERY

## 2017-04-05 PROCEDURE — 20600001 HC STEP DOWN PRIVATE ROOM

## 2017-04-05 PROCEDURE — 63600175 PHARM REV CODE 636 W HCPCS: Performed by: ANESTHESIOLOGY

## 2017-04-05 PROCEDURE — 86900 BLOOD TYPING SEROLOGIC ABO: CPT

## 2017-04-05 PROCEDURE — 27000221 HC OXYGEN, UP TO 24 HOURS

## 2017-04-05 PROCEDURE — 71000039 HC RECOVERY, EACH ADD'L HOUR: Performed by: COLON & RECTAL SURGERY

## 2017-04-05 PROCEDURE — 71000033 HC RECOVERY, INTIAL HOUR: Performed by: COLON & RECTAL SURGERY

## 2017-04-05 PROCEDURE — 27100025 HC TUBING, SET FLUID WARMER: Performed by: NURSE ANESTHETIST, CERTIFIED REGISTERED

## 2017-04-05 PROCEDURE — 44207 L COLECTOMY/COLOPROCTOSTOMY: CPT | Mod: ,,, | Performed by: COLON & RECTAL SURGERY

## 2017-04-05 PROCEDURE — 37000008 HC ANESTHESIA 1ST 15 MINUTES: Performed by: COLON & RECTAL SURGERY

## 2017-04-05 PROCEDURE — 0DTN0ZZ RESECTION OF SIGMOID COLON, OPEN APPROACH: ICD-10-PCS | Performed by: COLON & RECTAL SURGERY

## 2017-04-05 RX ORDER — INSULIN ASPART 100 [IU]/ML
1-10 INJECTION, SOLUTION INTRAVENOUS; SUBCUTANEOUS
Status: DISCONTINUED | OUTPATIENT
Start: 2017-04-05 | End: 2017-04-08 | Stop reason: HOSPADM

## 2017-04-05 RX ORDER — SODIUM CHLORIDE 0.9 % (FLUSH) 0.9 %
3 SYRINGE (ML) INJECTION
Status: DISCONTINUED | OUTPATIENT
Start: 2017-04-05 | End: 2017-04-05

## 2017-04-05 RX ORDER — IPRATROPIUM BROMIDE AND ALBUTEROL SULFATE 2.5; .5 MG/3ML; MG/3ML
3 SOLUTION RESPIRATORY (INHALATION) EVERY 4 HOURS PRN
Status: DISCONTINUED | OUTPATIENT
Start: 2017-04-05 | End: 2017-04-05 | Stop reason: HOSPADM

## 2017-04-05 RX ORDER — BUPIVACAINE HYDROCHLORIDE 2.5 MG/ML
INJECTION, SOLUTION EPIDURAL; INFILTRATION; INTRACAUDAL
Status: DISCONTINUED | OUTPATIENT
Start: 2017-04-05 | End: 2017-04-05 | Stop reason: HOSPADM

## 2017-04-05 RX ORDER — HYDROMORPHONE HYDROCHLORIDE 1 MG/ML
1 INJECTION, SOLUTION INTRAMUSCULAR; INTRAVENOUS; SUBCUTANEOUS
Status: DISCONTINUED | OUTPATIENT
Start: 2017-04-05 | End: 2017-04-08 | Stop reason: HOSPADM

## 2017-04-05 RX ORDER — METRONIDAZOLE 500 MG/100ML
500 INJECTION, SOLUTION INTRAVENOUS
Status: COMPLETED | OUTPATIENT
Start: 2017-04-05 | End: 2017-04-05

## 2017-04-05 RX ORDER — ACETAMINOPHEN 10 MG/ML
1000 INJECTION, SOLUTION INTRAVENOUS EVERY 8 HOURS
Status: DISPENSED | OUTPATIENT
Start: 2017-04-05 | End: 2017-04-06

## 2017-04-05 RX ORDER — NEOSTIGMINE METHYLSULFATE 1 MG/ML
INJECTION, SOLUTION INTRAVENOUS
Status: DISCONTINUED | OUTPATIENT
Start: 2017-04-05 | End: 2017-04-05

## 2017-04-05 RX ORDER — OXYCODONE HYDROCHLORIDE 5 MG/1
15 TABLET ORAL EVERY 4 HOURS PRN
Status: DISCONTINUED | OUTPATIENT
Start: 2017-04-05 | End: 2017-04-08 | Stop reason: HOSPADM

## 2017-04-05 RX ORDER — SODIUM CHLORIDE 9 MG/ML
INJECTION, SOLUTION INTRAVENOUS CONTINUOUS
Status: DISCONTINUED | OUTPATIENT
Start: 2017-04-05 | End: 2017-04-05

## 2017-04-05 RX ORDER — OXYCODONE HYDROCHLORIDE 5 MG/1
10 TABLET ORAL EVERY 4 HOURS PRN
Status: DISCONTINUED | OUTPATIENT
Start: 2017-04-05 | End: 2017-04-08 | Stop reason: HOSPADM

## 2017-04-05 RX ORDER — SODIUM CHLORIDE 9 MG/ML
INJECTION, SOLUTION INTRAVENOUS CONTINUOUS
Status: DISCONTINUED | OUTPATIENT
Start: 2017-04-05 | End: 2017-04-07

## 2017-04-05 RX ORDER — KETAMINE HYDROCHLORIDE 100 MG/ML
INJECTION, SOLUTION INTRAMUSCULAR; INTRAVENOUS
Status: DISCONTINUED | OUTPATIENT
Start: 2017-04-05 | End: 2017-04-05

## 2017-04-05 RX ORDER — GLYCOPYRROLATE 0.2 MG/ML
INJECTION INTRAMUSCULAR; INTRAVENOUS
Status: DISCONTINUED | OUTPATIENT
Start: 2017-04-05 | End: 2017-04-05

## 2017-04-05 RX ORDER — LIDOCAINE HYDROCHLORIDE 10 MG/ML
1 INJECTION, SOLUTION EPIDURAL; INFILTRATION; INTRACAUDAL; PERINEURAL ONCE
Status: COMPLETED | OUTPATIENT
Start: 2017-04-05 | End: 2017-04-05

## 2017-04-05 RX ORDER — IBUPROFEN 200 MG
16 TABLET ORAL
Status: DISCONTINUED | OUTPATIENT
Start: 2017-04-05 | End: 2017-04-08 | Stop reason: HOSPADM

## 2017-04-05 RX ORDER — GLUCAGON 1 MG
1 KIT INJECTION
Status: DISCONTINUED | OUTPATIENT
Start: 2017-04-05 | End: 2017-04-08 | Stop reason: HOSPADM

## 2017-04-05 RX ORDER — SODIUM CHLORIDE 0.9 % (FLUSH) 0.9 %
3 SYRINGE (ML) INJECTION EVERY 8 HOURS
Status: DISCONTINUED | OUTPATIENT
Start: 2017-04-05 | End: 2017-04-05

## 2017-04-05 RX ORDER — PROPOFOL 10 MG/ML
VIAL (ML) INTRAVENOUS
Status: DISCONTINUED | OUTPATIENT
Start: 2017-04-05 | End: 2017-04-05

## 2017-04-05 RX ORDER — HYDROMORPHONE HYDROCHLORIDE 1 MG/ML
0.2 INJECTION, SOLUTION INTRAMUSCULAR; INTRAVENOUS; SUBCUTANEOUS EVERY 5 MIN PRN
Status: DISCONTINUED | OUTPATIENT
Start: 2017-04-05 | End: 2017-04-05

## 2017-04-05 RX ORDER — CARVEDILOL 12.5 MG/1
12.5 TABLET ORAL 2 TIMES DAILY
Status: DISCONTINUED | OUTPATIENT
Start: 2017-04-05 | End: 2017-04-08 | Stop reason: HOSPADM

## 2017-04-05 RX ORDER — ALVIMOPAN 12 MG/1
12 CAPSULE ORAL
Status: COMPLETED | OUTPATIENT
Start: 2017-04-05 | End: 2017-04-05

## 2017-04-05 RX ORDER — ROCURONIUM BROMIDE 10 MG/ML
INJECTION, SOLUTION INTRAVENOUS
Status: DISCONTINUED | OUTPATIENT
Start: 2017-04-05 | End: 2017-04-05

## 2017-04-05 RX ORDER — ONDANSETRON 2 MG/ML
INJECTION INTRAMUSCULAR; INTRAVENOUS
Status: DISCONTINUED | OUTPATIENT
Start: 2017-04-05 | End: 2017-04-05

## 2017-04-05 RX ORDER — DIPHENHYDRAMINE HYDROCHLORIDE 50 MG/ML
12.5 INJECTION INTRAMUSCULAR; INTRAVENOUS EVERY 4 HOURS PRN
Status: DISCONTINUED | OUTPATIENT
Start: 2017-04-05 | End: 2017-04-08 | Stop reason: HOSPADM

## 2017-04-05 RX ORDER — ONDANSETRON 2 MG/ML
4 INJECTION INTRAMUSCULAR; INTRAVENOUS EVERY 8 HOURS PRN
Status: DISCONTINUED | OUTPATIENT
Start: 2017-04-05 | End: 2017-04-08 | Stop reason: HOSPADM

## 2017-04-05 RX ORDER — HYDRALAZINE HYDROCHLORIDE 20 MG/ML
INJECTION INTRAMUSCULAR; INTRAVENOUS
Status: DISCONTINUED | OUTPATIENT
Start: 2017-04-05 | End: 2017-04-05

## 2017-04-05 RX ORDER — ENOXAPARIN SODIUM 100 MG/ML
40 INJECTION SUBCUTANEOUS
Status: DISCONTINUED | OUTPATIENT
Start: 2017-04-05 | End: 2017-04-08 | Stop reason: HOSPADM

## 2017-04-05 RX ORDER — ACETAMINOPHEN 10 MG/ML
1000 INJECTION, SOLUTION INTRAVENOUS
Status: COMPLETED | OUTPATIENT
Start: 2017-04-05 | End: 2017-04-05

## 2017-04-05 RX ORDER — MIDAZOLAM HYDROCHLORIDE 1 MG/ML
INJECTION, SOLUTION INTRAMUSCULAR; INTRAVENOUS
Status: DISCONTINUED | OUTPATIENT
Start: 2017-04-05 | End: 2017-04-05

## 2017-04-05 RX ORDER — IBUPROFEN 200 MG
24 TABLET ORAL
Status: DISCONTINUED | OUTPATIENT
Start: 2017-04-05 | End: 2017-04-08 | Stop reason: HOSPADM

## 2017-04-05 RX ORDER — FENTANYL CITRATE 50 UG/ML
INJECTION, SOLUTION INTRAMUSCULAR; INTRAVENOUS
Status: DISCONTINUED | OUTPATIENT
Start: 2017-04-05 | End: 2017-04-05

## 2017-04-05 RX ORDER — HEPARIN SODIUM 5000 [USP'U]/ML
5000 INJECTION, SOLUTION INTRAVENOUS; SUBCUTANEOUS
Status: COMPLETED | OUTPATIENT
Start: 2017-04-05 | End: 2017-04-05

## 2017-04-05 RX ORDER — ATORVASTATIN CALCIUM 20 MG/1
80 TABLET, FILM COATED ORAL NIGHTLY
Status: DISCONTINUED | OUTPATIENT
Start: 2017-04-06 | End: 2017-04-08 | Stop reason: HOSPADM

## 2017-04-05 RX ADMIN — HYDROMORPHONE HYDROCHLORIDE 0.2 MG: 1 INJECTION, SOLUTION INTRAMUSCULAR; INTRAVENOUS; SUBCUTANEOUS at 06:04

## 2017-04-05 RX ADMIN — HYDRALAZINE HYDROCHLORIDE 4 MG: 20 INJECTION INTRAMUSCULAR; INTRAVENOUS at 03:04

## 2017-04-05 RX ADMIN — BUPIVACAINE HYDROCHLORIDE 30 ML: 2.5 INJECTION, SOLUTION EPIDURAL; INFILTRATION; INTRACAUDAL; PERINEURAL at 04:04

## 2017-04-05 RX ADMIN — SODIUM CHLORIDE, SODIUM GLUCONATE, SODIUM ACETATE, POTASSIUM CHLORIDE, MAGNESIUM CHLORIDE, SODIUM PHOSPHATE, DIBASIC, AND POTASSIUM PHOSPHATE: .53; .5; .37; .037; .03; .012; .00082 INJECTION, SOLUTION INTRAVENOUS at 04:04

## 2017-04-05 RX ADMIN — KETAMINE HYDROCHLORIDE 50 MG: 100 INJECTION, SOLUTION, CONCENTRATE INTRAMUSCULAR; INTRAVENOUS at 01:04

## 2017-04-05 RX ADMIN — HEPARIN SODIUM 5000 UNITS: 5000 INJECTION, SOLUTION INTRAVENOUS; SUBCUTANEOUS at 11:04

## 2017-04-05 RX ADMIN — ACETAMINOPHEN 1000 MG: 10 INJECTION, SOLUTION INTRAVENOUS at 09:04

## 2017-04-05 RX ADMIN — INSULIN ASPART 2 UNITS: 100 INJECTION, SOLUTION INTRAVENOUS; SUBCUTANEOUS at 06:04

## 2017-04-05 RX ADMIN — IBUPROFEN 400 MG: 800 INJECTION INTRAVENOUS at 11:04

## 2017-04-05 RX ADMIN — BUPIVACAINE 20 MG: 13.3 INJECTION, SUSPENSION, LIPOSOMAL INFILTRATION at 04:04

## 2017-04-05 RX ADMIN — NEOSTIGMINE METHYLSULFATE 5 MG: 1 INJECTION INTRAVENOUS at 04:04

## 2017-04-05 RX ADMIN — SODIUM CHLORIDE, SODIUM GLUCONATE, SODIUM ACETATE, POTASSIUM CHLORIDE, MAGNESIUM CHLORIDE, SODIUM PHOSPHATE, DIBASIC, AND POTASSIUM PHOSPHATE: .53; .5; .37; .037; .03; .012; .00082 INJECTION, SOLUTION INTRAVENOUS at 02:04

## 2017-04-05 RX ADMIN — IBUPROFEN 800 MG: 800 INJECTION INTRAVENOUS at 11:04

## 2017-04-05 RX ADMIN — FENTANYL CITRATE 25 MCG: 50 INJECTION, SOLUTION INTRAMUSCULAR; INTRAVENOUS at 02:04

## 2017-04-05 RX ADMIN — SODIUM CHLORIDE: 0.9 INJECTION, SOLUTION INTRAVENOUS at 01:04

## 2017-04-05 RX ADMIN — ALVIMOPAN 12 MG: 12 CAPSULE ORAL at 11:04

## 2017-04-05 RX ADMIN — ROCURONIUM BROMIDE 50 MG: 10 INJECTION, SOLUTION INTRAVENOUS at 01:04

## 2017-04-05 RX ADMIN — METRONIDAZOLE 500 MG: 500 SOLUTION INTRAVENOUS at 02:04

## 2017-04-05 RX ADMIN — ROCURONIUM BROMIDE 20 MG: 10 INJECTION, SOLUTION INTRAVENOUS at 02:04

## 2017-04-05 RX ADMIN — INSULIN ASPART 1 UNITS: 100 INJECTION, SOLUTION INTRAVENOUS; SUBCUTANEOUS at 10:04

## 2017-04-05 RX ADMIN — SODIUM CHLORIDE: 0.9 INJECTION, SOLUTION INTRAVENOUS at 05:04

## 2017-04-05 RX ADMIN — ROCURONIUM BROMIDE 20 MG: 10 INJECTION, SOLUTION INTRAVENOUS at 03:04

## 2017-04-05 RX ADMIN — PROPOFOL 160 MG: 10 INJECTION, EMULSION INTRAVENOUS at 01:04

## 2017-04-05 RX ADMIN — SODIUM CHLORIDE: 0.9 INJECTION, SOLUTION INTRAVENOUS at 11:04

## 2017-04-05 RX ADMIN — ONDANSETRON 4 MG: 2 INJECTION INTRAMUSCULAR; INTRAVENOUS at 04:04

## 2017-04-05 RX ADMIN — MIDAZOLAM HYDROCHLORIDE 2 MG: 1 INJECTION, SOLUTION INTRAMUSCULAR; INTRAVENOUS at 01:04

## 2017-04-05 RX ADMIN — FENTANYL CITRATE 25 MCG: 50 INJECTION, SOLUTION INTRAMUSCULAR; INTRAVENOUS at 04:04

## 2017-04-05 RX ADMIN — CARVEDILOL 12.5 MG: 12.5 TABLET, FILM COATED ORAL at 09:04

## 2017-04-05 RX ADMIN — OXYCODONE HYDROCHLORIDE 10 MG: 5 TABLET ORAL at 11:04

## 2017-04-05 RX ADMIN — ACETAMINOPHEN 1000 MG: 10 INJECTION, SOLUTION INTRAVENOUS at 02:04

## 2017-04-05 RX ADMIN — GENTAMICIN SULFATE 508 MG: 40 INJECTION, SOLUTION INTRAMUSCULAR; INTRAVENOUS at 02:04

## 2017-04-05 RX ADMIN — FENTANYL CITRATE 150 MCG: 50 INJECTION, SOLUTION INTRAMUSCULAR; INTRAVENOUS at 01:04

## 2017-04-05 RX ADMIN — LIDOCAINE HYDROCHLORIDE 2 MG: 10 INJECTION, SOLUTION EPIDURAL; INFILTRATION; INTRACAUDAL; PERINEURAL at 11:04

## 2017-04-05 RX ADMIN — HYDROMORPHONE HYDROCHLORIDE 0.2 MG: 1 INJECTION, SOLUTION INTRAMUSCULAR; INTRAVENOUS; SUBCUTANEOUS at 05:04

## 2017-04-05 RX ADMIN — FENTANYL CITRATE 25 MCG: 50 INJECTION, SOLUTION INTRAMUSCULAR; INTRAVENOUS at 03:04

## 2017-04-05 RX ADMIN — GLYCOPYRROLATE 0.6 MG: 0.2 INJECTION, SOLUTION INTRAMUSCULAR; INTRAVENOUS at 04:04

## 2017-04-05 RX ADMIN — INSULIN DETEMIR 25 UNITS: 100 INJECTION, SOLUTION SUBCUTANEOUS at 10:04

## 2017-04-05 RX ADMIN — ENOXAPARIN SODIUM 40 MG: 100 INJECTION SUBCUTANEOUS at 07:04

## 2017-04-05 RX ADMIN — HYDROMORPHONE HYDROCHLORIDE: 1 INJECTION, SOLUTION INTRAMUSCULAR; INTRAVENOUS; SUBCUTANEOUS at 06:04

## 2017-04-05 NOTE — IP AVS SNAPSHOT
[facility]  [address]  [place] [address]  Phone: [phone]           Patient Discharge Instructions   Our goal is to set you up for success. This packet includes information on your condition, medications, and your home care.  It will help you care for yourself to prevent having to return to the hospital.     Please ask your nurse if you have any questions.      There are many details to remember when preparing to leave the hospital. Here is what you will need to do:    1. Take your medicine. If you are prescribed medications, review your Medication List on the following pages. You may have new medications to  at the pharmacy and others that you'll need to stop taking. Review the instructions for how and when to take your medications. Talk with your doctor or nurses if you are unsure of what to do.     2. Go to your follow-up appointments. Specific follow-up information is listed in the following pages. Your may be contacted by a nurse or clinical provider about future appointments. Be sure we have all of the phone numbers to reach you. Please contact your provider's office if you are unable to make an appointment.     3. Watch for warning signs. Your doctor or nurse will give you detailed warning signs to watch for and when to call for assistance. These instructions may also include educational information about your condition. If you experience any of warning signs to your health, call your doctor.           [facility]  Unless otherwise directed by your provider, please   contact [facility], our nurse care line   that is available for 24/7 assistance.     [phone] (toll-free)     Registered nurses in the [facility]   provide: appointment scheduling, clinical advisement, health education, and other advisory services.                  ** Verify the list of medication(s) below is accurate and up to date. Carry this with you in case of  emergency. If your medications have changed, please notify your healthcare provider.             Medication List      START taking these medications        Additional Info                      ibuprofen 800 MG tablet   Commonly known as:  ADVIL,MOTRIN   Quantity:  50 tablet   Refills:  0   Dose:  800 mg    Instructions:  Take 1 tablet (800 mg total) by mouth 3 (three) times daily as needed for Pain.     Begin Date    AM    Noon    PM    Bedtime       oxycodone-acetaminophen 5-325 mg per tablet   Commonly known as:  PERCOCET   Quantity:  50 tablet   Refills:  0   Dose:  1 tablet    Instructions:  Take 1 tablet by mouth every 6 (six) hours as needed for Pain.     Begin Date    AM    Noon    PM    Bedtime         CHANGE how you take these medications        Additional Info                      atorvastatin 80 MG tablet   Commonly known as:  LIPITOR   Quantity:  30 tablet   Refills:  6   Dose:  80 mg   What changed:  when to take this    Last time this was given:  80 mg on 4/7/2017  8:51 PM   Instructions:  Take 1 tablet (80 mg total) by mouth once daily.     Begin Date    AM    Noon    PM    Bedtime       carvedilol 12.5 MG tablet   Commonly known as:  COREG   Quantity:  60 tablet   Refills:  3   Dose:  12.5 mg   What changed:  additional instructions    Last time this was given:  12.5 mg on 4/8/2017  8:40 AM   Instructions:  Take 1 tablet (12.5 mg total) by mouth 2 (two) times daily.     Begin Date    AM    Noon    PM    Bedtime       lisinopril 20 MG tablet   Commonly known as:  PRINIVIL,ZESTRIL   Quantity:  30 tablet   Refills:  6   Dose:  20 mg   What changed:  when to take this    Last time this was given:  20 mg on 4/8/2017  8:40 AM   Instructions:  Take 1 tablet (20 mg total) by mouth once daily.     Begin Date    AM    Noon    PM    Bedtime         CONTINUE taking these medications        Additional Info                      aspirin 81 MG Chew   Refills:  0   Dose:  81 mg    Instructions:  Take 81 mg by mouth  "every evening. swallows     Begin Date    AM    Noon    PM    Bedtime       blood sugar diagnostic Strp   Quantity:  150 strip   Refills:  12    Instructions:  Strips and lancets  Use before meals and bedtime     Begin Date    AM    Noon    PM    Bedtime       insulin aspart 100 unit/mL Inpn pen   Commonly known as:  NOVOLOG FLEXPEN   Quantity:  2 Box   Refills:  3    Last time this was given:  2 Units on 4/8/2017  8:40 AM   Instructions:  Inject 20 units with meals + correction scale goal 150, ISF 25- TDD 90     Begin Date    AM    Noon    PM    Bedtime       insulin glargine 100 unit/mL (3 mL) Inpn pen   Commonly known as:  LANTUS SOLOSTAR   Quantity:  2 Box   Refills:  3    Instructions:  INJECT 52 UNITS SUBCUTANEOUSLY IN THE EVENING     Begin Date    AM    Noon    PM    Bedtime       metformin 1000 MG tablet   Commonly known as:  GLUCOPHAGE   Quantity:  60 tablet   Refills:  6   Dose:  1000 mg    Instructions:  Take 1 tablet (1,000 mg total) by mouth 2 (two) times daily with meals.     Begin Date    AM    Noon    PM    Bedtime       nitroGLYCERIN 0.4 MG SL tablet   Commonly known as:  NITROSTAT   Quantity:  30 tablet   Refills:  1   Dose:  0.4 mg    Instructions:  Place 1 tablet (0.4 mg total) under the tongue every 5 (five) minutes as needed for Chest pain.     Begin Date    AM    Noon    PM    Bedtime       pen needle, diabetic 31 gauge x 3/16" Ndle   Quantity:  100 each   Refills:  12   Dose:  1 each    Instructions:  Inject 1 each into the skin 3 (three) times daily before meals.     Begin Date    AM    Noon    PM    Bedtime            Where to Get Your Medications      You can get these medications from any pharmacy     Bring a paper prescription for each of these medications     ibuprofen 800 MG tablet    oxycodone-acetaminophen 5-325 mg per tablet                  Please bring to all follow up appointments:    1. A copy of your discharge instructions.  2. All medicines you are currently taking in their " original bottles.  3. Identification and insurance card.    Please arrive 15 minutes ahead of scheduled appointment time.    Please call 24 hours in advance if you must reschedule your appointment and/or time.        Your Scheduled Appointments     Apr 24, 2017  8:30 AM CDT   Post OP with Simon Gomez MD   [facility] ([facility] )    [address]  [place] [id number] [phone]            May 17, 2017  8:30 AM CDT   Consult with Will Samuel MD   [facility] - [facility] ([facility] )    [address]  [place] [id number] [phone]            May 19, 2017  8:30 AM CDT   Established Patient Visit with Simon Gray OD   [place] - Optometry ([facility] [place])    [address] [facility]  [place] [id number] [phone]            May 24, 2017  7:30 AM CDT   Consult with DAE Spence [place] - Podiatry ([facility] )    [address]  [place] [id number] [phone]            May 24, 2017  8:00 AM CDT   Diabetes Education with Terra King RD   [facility] ([facility] )    [address]  [place] [id number] [phone]              Follow-up Information     Follow up with Simon Gomez MD. Go on 4/24/2017.    Specialty:  Colon and Rectal Surgery    Why:  PostOp Follow-up appt    Contact information:    [address] [facility] [address]  [phone]          Discharge Instructions     Future Orders    Call MD for:  persistent nausea and vomiting or diarrhea     Call MD for:  redness, tenderness, or signs of infection (pain, swelling, redness, odor or green/yellow discharge around incision site)     Call MD for:  severe uncontrolled pain     Call MD for:  temperature >100.4     Diet general     Questions:    Total calories:      Fat restriction, if any:      Protein restriction, if any:      Na restriction, if any:       "Fluid restriction:      Additional restrictions:      Other restrictions (specify):     Comments:    OK to shower, no bath.  No lifting heavier than 10lbs until seen back in clinic        Primary Diagnosis     Your primary diagnosis was:  Colon Cancer      Admission Information     Date & Time Provider Department CSN    4/5/2017 10:12 AM Simon Gomez MD [facility] [id number]      Care Providers     Provider Role Specialty Primary office phone    Simon Gomez MD Attending Provider Colon and Rectal Surgery [phone]    Simon Gomez MD Surgeon  Colon and Rectal Surgery [phone]      Your Vitals Were     BP Pulse Temp Resp Height Weight    134/85 (BP Location: Right arm, Patient Position: Sitting, BP Method: Automatic) 72 97.3 °F (36.3 °C) (Oral) 18 6' 2" (1.88 m) 127 kg (280 lb)    SpO2 BMI             97% 35.95 kg/m2         Recent Lab Values        8/24/2012 5/23/2013 10/24/2013 1/21/2014 2/23/2015 6/10/2015 6/24/2016 3/24/2017      8:10 AM  4:20 AM 12:00 PM  9:40 AM  7:17 AM  7:08 AM  7:29 AM  7:23 AM    A1C 12.6 (H) 13.5 (H) 8.3 (H) 9.1 (H) 11.7 (H) 11.7 (H) 11.7 (H) 12.1 (H)    Comment for A1C at  7:23 AM on 3/24/2017:  According to ADA guidelines, hemoglobin A1C <7.0% represents  optimal control in non-pregnant diabetic patients.  Different  metrics may apply to specific populations.   Standards of Medical Care in Diabetes - 2016.  For the purpose of screening for the presence of diabetes:  <5.7%     Consistent with the absence of diabetes  5.7-6.4%  Consistent with increasing risk for diabetes   (prediabetes)  >or=6.5%  Consistent with diabetes  Currently no consensus exists for use of hemoglobin A1C  for diagnosis of diabetes for children.        Pending Labs     Order Current Status    Specimen to Pathology - Surgery In process      Allergies as of 4/8/2017        Reactions    Penicillins Anaphylaxis    Shellfish Containing Products     Other reaction(s): Unknown    Vancomycin " Itching    Bactrim  [Sulfamethoxazole-trimethoprim] Rash      Advance Directives     An advance directive is a document which, in the event you are no longer able to make decisions for yourself, tells your healthcare team what kind of treatment you do or do not want to receive, or who you would like to make those decisions for you.  If you do not currently have an advance directive, [facility] encourages you to create one.  For more information call:  [phone]), [phone] ([phone]),  or log on to [email or web address].        Language Assistance Services     ATTENTION: Language assistance services are available, free of charge. Please call [phone].      ATENCIÓN: Si habla español, tiene a whittaker disposición servicios gratuitos de asistencia lingüística. Llame al [phone].     CHÚ Ý: N?u b?n nói Ti?ng Vi?t, có các d?ch v? h? tr? ngôn ng? mi?n phí dành cho b?n. G?i s? [phone].        Diabetes Discharge Instructions                                    [facility] complies with applicable Federal civil rights laws and does not discriminate on the basis of race, color, national origin, age, disability, or sex.

## 2017-04-05 NOTE — H&P (VIEW-ONLY)
Subjective:       Patient ID: Billy Rivera is a 51 y.o. male.    Chief Complaint: Pre-op Exam    HPI  50 yo M, underwent asymptomatic screening colonoscopy 2/17/17:    -Diverticulosis in the sigmoid colon and in the descending colon.  -5 mm polyp in the cecum, removed with a cold snare - hyperplastic  -Granularity at the appendiceal orifice. Biopsied - benign mucosa with lymphoid aggregates  -4 mm polyp in the ascending colon, removed with a hot snare - hyperplastic  -6 mm polyp at the hepatic flexure, removed with a hot snare - hyperplastic  -10 mm pedunculated polyp with a very broad based stalk in the sigmoid colon, removed with a hot snare - INVASIVE ADENOCARCINOMA ARISING FROM AN ADENOMATOUS POLYP WITH HIGH-GRADE DYSPLASIA.  (Note: Tumor focally extends through the muscularis mucosa)  -Two 2 to 3 mm polyps at the recto-sigmoid colon, removed with a jumbo cold forceps - hyperplastic/    Further comments regarding the malignant polyp from pathologist: The tumor appears of intermediate differentiation and is focally present at the edges of the biopsies.  Although a small margin is demonstrated in one well oriented section, adequate margins are not demonstrated in the other sections submitted. A polyp stalk is not identified within the specimen. Lymphovascular invasion is not identified.    His case was reviewed at [facility] multi-disciplinary conference and consensus was to proceed with segmental colectomy.    He offers no complaints today.  No abdominal pain, unexplained weight loss, anorexia, recent change in bowel habits, or other constitutional symptoms.   No blood in his stool.    No family hx of CRC or IBD.      Review of patient's allergies indicates:   Allergen Reactions    Penicillins Anaphylaxis    Shellfish containing products      Other reaction(s): Unknown    Vancomycin Itching    Bactrim  [sulfamethoxazole-trimethoprim] Rash       Past Medical History:   Diagnosis Date    CAD (coronary artery  "disease), native coronary artery 6/25/2013    Diabetes mellitus     Heart attack 04/2012    Hyperlipidemia     Hypertension     Muscular pain     post-op after colonoscopy       Past Surgical History:   Procedure Laterality Date    COLONOSCOPY N/A 2/17/2017    Procedure: COLONOSCOPY;  Surgeon: Simon Gomez MD;  Location: [facility] ([address] [place] [address]);  Service: Endoscopy;  Laterality: N/A;    CORONARY ANGIOPLASTY         Current Outpatient Prescriptions   Medication Sig Dispense Refill    atorvastatin (LIPITOR) 80 MG tablet Take 1 tablet (80 mg total) by mouth once daily. 30 tablet 6    blood sugar diagnostic Strp Strips and lancets    Use before meals and bedtime 150 strip 12    carvedilol (COREG) 12.5 MG tablet Take 1 tablet (12.5 mg total) by mouth 2 (two) times daily. 60 tablet 3    LANTUS SOLOSTAR 100 unit/mL (3 mL) InPn pen INJECT 50 UNITS SUBCUTANEOUSLY IN THE EVENING 15 Syringe 3    lisinopril (PRINIVIL,ZESTRIL) 20 MG tablet Take 1 tablet (20 mg total) by mouth once daily. 30 tablet 6    metformin (GLUCOPHAGE) 1000 MG tablet Take 1 tablet (1,000 mg total) by mouth 2 (two) times daily with meals. 60 tablet 6    NOVOLOG FLEXPEN 100 unit/mL InPn pen INJECT  15 UNITS  SUBCUTANEOUSLY WITH MEALS 15 Syringe 6    pen needle, diabetic 31 gauge x 3/16" Ndle Inject 1 each into the skin 3 (three) times daily before meals. 100 each 12    aspirin 81 MG Chew Take 81 mg by mouth once daily.      metronidazole (FLAGYL) 500 MG tablet Take 1 tablet (500 mg total) by mouth As instructed. Take 1 tablet at 1pm, 2pm, 11pm the day before surgery 3 tablet 0    neomycin (MYCIFRADIN) 500 mg Tab Take 2 tablets (1,000 mg total) by mouth As instructed. Take 2 tablets at 1pm, 2pm, 11pm the day before surgery 6 tablet 0    nitroGLYCERIN (NITROSTAT) 0.4 MG SL tablet Place 1 tablet (0.4 mg total) under the tongue every 5 (five) minutes as needed for Chest pain. 30 tablet 1     No current facility-administered medications for this " visit.        Family History   Problem Relation Age of Onset    Heart disease Mother     Heart disease Brother     Anesthesia problems Neg Hx        Social History     Social History    Marital status:      Spouse name: N/A    Number of children: N/A    Years of education: N/A     Social History Main Topics    Smoking status: Never Smoker    Smokeless tobacco: Never Used    Alcohol use Yes      Comment: rare x1 beer    Drug use: No    Sexual activity: Not Currently     Other Topics Concern    None     Social History Narrative       Review of Systems   Constitutional: Negative for chills and fever.   HENT: Negative for congestion and sinus pressure.    Eyes: Negative for visual disturbance.   Respiratory: Negative for cough and shortness of breath.    Cardiovascular: Negative for chest pain and palpitations.   Gastrointestinal: Negative for abdominal distention, abdominal pain, anal bleeding, blood in stool, constipation, diarrhea, nausea, rectal pain and vomiting.   Endocrine: Negative for cold intolerance and heat intolerance.   Genitourinary: Negative for dysuria and frequency.   Musculoskeletal: Negative for arthralgias and back pain.   Skin: Negative for rash.   Allergic/Immunologic: Negative for immunocompromised state.   Neurological: Negative for dizziness, light-headedness and headaches.   Psychiatric/Behavioral: Negative for confusion. The patient is not nervous/anxious.        Objective:      Physical Exam   Constitutional: He is oriented to person, place, and time. He appears well-developed and well-nourished.   HENT:   Head: Normocephalic and atraumatic.   Eyes: Conjunctivae are normal.   Pulmonary/Chest: Effort normal. No respiratory distress.   Abdominal: Soft. Bowel sounds are normal. He exhibits no distension and no mass. There is no tenderness. There is no rebound and no guarding.   Neurological: He is alert and oriented to person, place, and time.   Skin: Skin is warm and dry. No  erythema.           Assessment:       1. Malignant neoplasm of sigmoid colon      1 cm semi-pedunculated containing invasive adenocarcinoma with +resection margin  Plan:   Scheduled for lap sigmoid colectomy 4/5/17.  Check staging CT scan C/A/P  CBC, CMP, CEA  To be seen by pre-op center for clearance given hx of CAD/stent    I have discussed the procedure at length with Billy Zambranoberta Rivera and his wife.  We discussed the rationale, risks, benefits, and alternatives in depth.  We discussed the expected outcomes and potential complications including but not limited to possible need for temporary stoma, possible need for post-op chemotherapy, bleeding, infection, anastomotic leak, recurrence, prolonged pain, need for further procedures, altered continence and incisional hernia.  He verbalized his understanding of the procedure and wishes to proceed.  Written consent was obtained.    Simon Gomez MD, FACS, FASCRS

## 2017-04-05 NOTE — TRANSFER OF CARE
"Anesthesia Transfer of Care Note    Patient: Billy Rivera    Procedure(s) Performed: Procedure(s) (LRB):  SIGMOIDECTOMY-LAPAROSCOPIC (N/A)    Patient location: PACU    Anesthesia Type: general    Transport from OR: Transported from OR on 6-10 L/min O2 by face mask with adequate spontaneous ventilation    Post pain: adequate analgesia    Post assessment: no apparent anesthetic complications    Post vital signs: stable    Level of consciousness: awake    Nausea/Vomiting: no nausea/vomiting    Complications: none          Last vitals:   Visit Vitals   04/05/17 1717    BP (!) 147/80 (BP Location: Right arm, Patient Position: Lying, BP Method: Automatic)    Pulse 67    Temp 36.4 °C (97.6 °F) (Oral)    Resp 12 Sp02 98%     Ht 6' 2" (1.88 m)    Wt 127 kg (280 lb)    BMI 35.95 kg/m2     "

## 2017-04-05 NOTE — H&P (VIEW-ONLY)
Chief complaint-Preoperative evaluation , Perioperative Medical management, complication reduction plan     Date of Evaluation- 03/24/2017    PCP-  P Shree Baker MD    Future cases for Billy Rivera [[id number]]     Case ID Status Date Time Reginaldo Procedure Provider Location    [id number] [facility] 4/5/2017 10:00  SIGMOIDECTOMY-LAPAROSCOPIC Simon Gomez MD [[id number]] [facility] OR 2ND FLR      Malignant neoplasm of sigmoid colon    History of present illness- I had the pleasure of meeting this pleasant 51 y.o. gentleman in the pre op clinic prior to his elective Abdominal surgery. The patient is new to me .     I have obtained the history by speaking to the patient and by reviewing the electronic health records.    Events leading up to surgery / History of presenting illness -    Underwent a routine colonoscopy in Feb 2017 and was found to have polyps and was found to have malignant neoplasm of the sigmoid colon   No pain from this .No aggravating factors. No relieving factors     Relevant health conditions of significance for the perioperative period/ History of presenting illness -    HTN (hypertension) ,On medication  Usual BP readings 120-130/80's   Goal BP < 140/ <90    Type 2 Diabetes Mellitus  On treatment with oral agent Metformin , Novolog with meals and Lantus at bed time  Hemoglobin A1c- 11.7 - June 2016   Capillary glucose check-yes  Pre breakfast -150-160  Pre lunch -180-190  Bed time-140-150  Works as a security and as a    Variable meal pattern with reference to the number of meals and times of meals  Usual Breakfast- bowl of cereal/ eggs toast/ oat meal - Milk/ tea  Usual lunch - white bread sand witch/ plate lunch with meat, vegetable, potato, Diet coke  Usual Supper  - same as lunch- diet coke, milk, tea water  Usually skips Insulin once in 1-2 weeks  No tingling , numbness   Type II or unspecified type diabetes mellitus with ophthalmic manifestations, uncontrolled    BDR (background diabetic  retinopathy) - Both Eyes   Microalbuminuria   Was under Endocrine evaluation    Coronary artery disease -History of MI May 2013  CAD s/p PCI of LAD (SHELBY) in May 2013  No History of CABG.History of stenting  Chest pain- Location-central, shoulder blades felt weak different occasions, Radiation-none at  Rest,not  associated with shortness of breath, sweating, nausea, No more person  He had LHC -5/23/13 ,-LAD- IVUS of prox LAD 95% stenosis. Mid LAD 50% ,-D1-75%   -LCX 75% ,-RCA was not found ,INTERVENTION: Proximal LAD: The lesion was successfully intervened. Post-stenosis of 0%The following items were used: Stent Xience Rx 2.5mm X 12mm (SHELBY).   ASA, Plavix combination for 1 year and currently on ASA through the surgery    Sleep apnea .Not using either CPAP/BIPAP .. Informed the risk of worsening sleep apnea in the perioperative period  Avoidance of  supine sleep, weight gain and alcoholic beverages discussed since all of these can worsen MICHAELA     Obesity- suggested weight loss    Active cardiac conditions- none    Revised cardiac risk index predictors- coronary artery disease and insulin requiring diabetes mellitus     Functional capacity -Examples of physical activity- does what he wants,    house work,  can take 1 flight of stairs,  cutting the grass with a mower,  raking lawn ,  planting shrubs,  weeding garden,  swimming ,  dancing  and  digging ----- He can undertake all the above activities without  chest pain,chest tightness, Shortness of breath ,dizziness,lightheadedness making his exercise tolerance more  than 4 Mets.       Review of symptoms    ROS    Constitutional - No significant weight changes ,No fever  Eyes- No new vision changes  ENT- sleep apnea  Cardiac-As above   Respiratory- No cough, expectoration and  no hemoptysis  GI- Bowel movements  regular  No overt GI/ blood losses   -No dysuria and  no urinary hesitancy   MS-No unusual muscle,Joint pain  Neurologic-No unilateral weakness   Psychiatric-  No depression,Anxiety  Endocrine-  Prednisone use for over 3 weeks -no, hyperglycemic effects discussed  Hematological/Lymphatic-No spontaneous bruising, bleeding    Past Medical history- reviewed in EPIC    Pertinent negatives-   DVT-  Pulmonary embolism-      Past Surgical history - reviewed in EPIC-    Most recent procedure- Stenting  No Anaesthetic,Bleeding ,Cardiac problems with previous surgeries-  No history of delirium -  No history of post operative  nausea, vomiting-   Transiently felt generally weak after colonoscopy lasting for 5 minutes that resolved by itself    Family history- reviewed in EPIC    Heart disease-mother - older, brother- late 40's   Thrombosis- None-  Anaesthetic complications- None-  Diabetes Mellitus -parents, grand parents on both sides    Social history- reviewed in EPIC    Lives with wife  Help available post op     Medications and Allergies - reviewed in EPIC    Exam    Physical Exam  Constitutional- Vitals - Body mass index is 36.88 kg/(m^2).,   Vitals:    03/24/17 0801   BP: 127/84   Pulse: 68   Temp: 98.7 °F (37.1 °C)   sat 95 %  General appearance-Conscious,Coherent  Eyes- No conjunctival icterus,pupils  round  and reactive to light   ENT-Oral cavity- moist  , Hearing grossly normal   Neck- No thyromegaly ,Trachea -central, No jugular venous distension, No Carotid Bruit ,  Cardiovascular -Heart Sounds- Normal  and  no murmur   , No gallop rhythm   Respiratory - Normal Respiratory Effort, Normal breath sounds and  no wheeze    Peripheral pitting pedal edema-- mild and  bilateral lower extremity telangiectasia , no calf pain   Gastrointestinal -Soft abdomen, No palpable masses, Non Tender,Liver,Spleen not palpable. No-- free fluid and shifting dullness  Musculoskeletal- No finger Clubbing. Strength grossly normal   Lymphatic-No Palpable cervical, axillary,Inguinal lymphadenopathy   Psychiatric - normal effect,Orientation  Rt Dorsalis pedis pulses-palpable    Lt Dorsalis pedis  pulses- palpable   Rt Posterior tibial pulses -palpable   Left posterior tibial pulses -palpable   Miscellaneous - onychomycosis,  no suggestion of bacterial feet infection and  no renal bruit    Investigations    Review of Medicine tests    EKG- I had independently reviewed the EKG from--3/13/2017  It was reported to be showing     Sinus bradycardia  Left axis deviation  When compared with ECG of 24-MAY-2013 03:32,  Premature atrial complexes are no longer Present  T wave inversion no longer evident in Anterior leads    Echo May 2013        1 - Normal left ventricular function (EF 60%).     2 - Concentric remodeling.     3 - Normal diastolic function.     4 - Normal right ventricular function  Review of clinical lab tests-Date--3/13/2017- Creatinine-1.00 Adjusted anion gap 11  Date--3/13/2017 Hemoglobin--N  Platelet count--N    Review of old records- Was done and information gathered regards to events leading to surgery and health conditions of significance in the perioperative period    Orders placed-Endocrinology consultation      Assessment and plan    New problems to me      Preoperative  risk assessment    Cardiac    Revised cardiac risk index ( RCRI ) predictors-2 ---.Functional capacity  Is more than 4 Mets. He will be undergoing a  abdominal procedure that carries a intermediate risk     The estimated risk of the rate of adverse cardiac outcomes   6.6 %  No further cardiac work up is indicated prior to proceeding with the surgery     American Society of Anesthesiologists Physical status classification ( ASA ) class- - 3/4    Postoperative pulmonary complication risk  Canet-Low , if duration of surgery is less than 3, Intermediate , if over 3 hours       Perioperative Medical management / Optimization    Type 2 Diabetes Mellitus uncontrolled -I suggest monitoring the glucose in the perioperative period ( Before meals and bed time,if the patient is on oral feeds or every 6 hourly ,if the patient is NPO  )  Blood glucose targets in hospitalized patients are ( Critical care patients 140-180 mg/dl, Med/Surg patients ( non critical care) 100-140 mg/dl, patients on continuous enteral or parenteral nutrition 140-180 mg/dl )  .Oral Hypoglycemic agents are generally avoided during the hospital stay . If glucose is consistently elevated ,I suggest using basal ,prandial Insulin regimen to control the glucose , as elevated glucose can be associated with adverse surgical out comes. Please consider involving Hospital Medicine or Endocrinology ,if any help is needed with Glucose control. Patient will be instructed based on the pre op clinic guidelines  about adjustment of diabetic treatment  considering the NPO status for Surgery     I had educated that uncontrolled DM can cause post op complications,risk of infection, wound healing problem,increased length of stay in hospital and its associated complications.I suggest exercise as much as possible and follow diabetic diet  Suggested regular meal pattern, avoidance of sodas ,avoidance of white bread, excessive starches. Tried calling wife to discuss importance of DM control - could not talk      Sleep apnea- I suggest caution with usage of medication that can cause respiratory suppression in the perioperative period  potential ramifications of untreated sleep apnea, which could include daytime sleepiness, hypertension, heart disease and stroke were discussed    HLD-I  suggest continuation of statin during the entire perioperative period.    Hypertension-  Blood pressure is acceptable . I suggest continuation of -Coreg during the entire perioperative period. I suggest holding Lisinopril- on the morning of the surgery and can continue that  post operatively under blood pressure, electrolyte and renal function monitoring as long as they are acceptable.I suggest addressing pain control as uncontrolled pain can increased blood pressure   Goal BP < 140/ <90    Coronary artery disease  -History of MI May 2013  CAD s/p PCI of LAD (SHELBY) in May 2013  Stable    Obesity- suggested weight loss    Edema- I suggested avoidance of added salt,avoidance of NSAID's ( Except ASA 81 mg ) and suggested Limb elevation and sha hose use        Preventive perioperative care    Thromboembolic prophylaxis:  His risk factors for thrombosis include cancer, obesity, surgical procedure and age.I suggest  thromboembolic prophylaxis ( mechanical/pharmacological, weighing the risk benefits of pharmacological agent use considering oralia procedural bleeding )  during the perioperative period.I suggested being active in the post operative period.     Postoperative pulmonary complication prophylaxis-Risk factors for post operative pulmonary complications include sleep apnea and  ASA class >2- I suggest incentive Spirometry use ,  early ambulation ,  end tidal carbon dioxide  Monitoring  and   pain control so as to avoid diaphragmatic splinting       Renal complication prophylaxis-Risk factors for renal complications include Diabetes Mellitus, Hypertension and  vascular disease . I suggest keeping him well hydrated .I suggested drinking 2 litre's of water a day     Surgical site Infection Prophylaxis-I  suggest appropriate antibiotic for Prophylaxis against Surgical site infections    This visit was focussed on Preoperative evaluation , Perioperative Medical management, complication reduction plans and I suggest that the patient follows up with the primary care ,relevant sub specialists for on going health care      I appreciate the opportunity to be involved in this  pleasant  gentleman's care.Please feel free to contact me if there were any questions about this consultation     Patient is optimized- for the above     Dr JOHANA Shipley MD [facility] ( ),FACP   Perioperative Medicine  [facility]   Pager [phone]  -----------------------------------------------------    3/24- 14 28     A1c from today is 12 .1    Corresponded to Endocrinology  Called to discuss A1c- spoke to patient   Suggested sending CBG readings to me  ---------------------------------------    3/27- 1303     Called and spoke to patient   CBG- 250 before meals , 2 hours post meals  If glucose over 150 at meal time adds 10 units meal time insulin with no hypoglycemia  Increase Lantus to 52 units- may need to split the dose to twice daily   May need more Novolog scheduled     -----------------------    3/29- 8 27     Corresponded to Endocrinology - may not be able to get his glucose optimized prior to surgery . Given the cancer diagnosis we may have to accept some sub optimal glucose control , given the risk of cancer spread with delay in surgery  Chart reviewed - patient opted to come to [facility] for his diabetic care   ------------------------------------    3/31- 1703     CT - 3/31- no metastasis  Had endo visit today - Noted plan for Increased novolog  -------------------    4/3- 17 46     Had endocrinology evaluation   As per Endo Note Reports his is checking his BG TID (AM, afternoon(prelunch), bedtime). He reports all the readings are 180-190  cut back on his Lantus by 25% to 39 units tonight and the following night before surgery. Hold Novolog while on sugar-free clears and NPO  -------------------------------    4/4- 15 10     Called to follow up   unable to speak to patient   Left a message   Suggested Diabetic control  ---------------------    4/4- 17 55    Called to follow up   Left a message -reinforced DM control

## 2017-04-05 NOTE — ANESTHESIA POSTPROCEDURE EVALUATION
"Anesthesia Post Evaluation    Patient: Billy Yatesn    Procedure(s) Performed: Procedure(s) (LRB):  SIGMOIDECTOMY-LAPAROSCOPIC (N/A)    Final Anesthesia Type: general  Patient location during evaluation: PACU  Patient participation: Yes- Able to Participate  Level of consciousness: awake and alert and oriented  Post-procedure vital signs: reviewed and stable  Pain management: adequate  Airway patency: patent  PONV status at discharge: No PONV  Anesthetic complications: no      Cardiovascular status: blood pressure returned to baseline, hemodynamically stable and stable  Respiratory status: unassisted, room air and spontaneous ventilation  Hydration status: euvolemic  Follow-up not needed.        Visit Vitals    BP (!) 145/66    Pulse 67    Temp 36.4 °C (97.6 °F) (Axillary)    Resp 11    Ht 6' 2" (1.88 m)    Wt 127 kg (280 lb)    SpO2 96%    BMI 35.95 kg/m2       Pain/Gabriel Score: Pain Assessment Performed: Yes (4/5/2017  5:15 PM)  Presence of Pain: non-verbal indicators present (4/5/2017  5:15 PM)  Pain Rating Prior to Med Admin: 6 (4/5/2017  6:01 PM)      "

## 2017-04-05 NOTE — ANESTHESIA RELEASE NOTE
"Anesthesia Release from PACU Note    Patient: Billy Rivera    Procedure(s) Performed: Procedure(s) (LRB):  SIGMOIDECTOMY-LAPAROSCOPIC (N/A)    Anesthesia type: general    Post pain: Adequate analgesia    Post assessment: no apparent anesthetic complications, tolerated procedure well and no evidence of recall    Last Vitals:   Visit Vitals    BP (!) 145/66    Pulse 67    Temp 36.4 °C (97.6 °F) (Axillary)    Resp 11    Ht 6' 2" (1.88 m)    Wt 127 kg (280 lb)    SpO2 96%    BMI 35.95 kg/m2       Post vital signs: stable    Level of consciousness: awake, alert  and oriented    Nausea/Vomiting: no nausea/no vomiting    Complications: none    Airway Patency: patent    Respiratory: unassisted    Cardiovascular: stable and blood pressure at baseline    Hydration: euvolemic  "

## 2017-04-05 NOTE — INTERVAL H&P NOTE
The patient has been examined and the H&P has been reviewed:    I concur with the findings and no changes have occurred since H&P was written.    Anesthesia/Surgery risks, benefits and alternative options discussed and understood by patient/family.          Active Hospital Problems    Diagnosis  POA    Colon cancer [C18.9]  Yes      Resolved Hospital Problems    Diagnosis Date Resolved POA   No resolved problems to display.

## 2017-04-06 LAB
ANION GAP SERPL CALC-SCNC: 8 MMOL/L
BASOPHILS # BLD AUTO: 0.01 K/UL
BASOPHILS NFR BLD: 0.1 %
BUN SERPL-MCNC: 15 MG/DL
CALCIUM SERPL-MCNC: 7.5 MG/DL
CHLORIDE SERPL-SCNC: 104 MMOL/L
CO2 SERPL-SCNC: 22 MMOL/L
CREAT SERPL-MCNC: 0.8 MG/DL
DIFFERENTIAL METHOD: ABNORMAL
EOSINOPHIL # BLD AUTO: 0 K/UL
EOSINOPHIL NFR BLD: 0.1 %
ERYTHROCYTE [DISTWIDTH] IN BLOOD BY AUTOMATED COUNT: 13 %
EST. GFR  (AFRICAN AMERICAN): >60 ML/MIN/1.73 M^2
EST. GFR  (NON AFRICAN AMERICAN): >60 ML/MIN/1.73 M^2
GLUCOSE SERPL-MCNC: 171 MG/DL
HCT VFR BLD AUTO: 40.4 %
HGB BLD-MCNC: 14.3 G/DL
LYMPHOCYTES # BLD AUTO: 1.2 K/UL
LYMPHOCYTES NFR BLD: 9.6 %
MAGNESIUM SERPL-MCNC: 1.5 MG/DL
MCH RBC QN AUTO: 29.7 PG
MCHC RBC AUTO-ENTMCNC: 35.4 %
MCV RBC AUTO: 84 FL
MONOCYTES # BLD AUTO: 1.1 K/UL
MONOCYTES NFR BLD: 9.2 %
NEUTROPHILS # BLD AUTO: 10 K/UL
NEUTROPHILS NFR BLD: 80.7 %
PHOSPHATE SERPL-MCNC: 3.3 MG/DL
PLATELET # BLD AUTO: 218 K/UL
PMV BLD AUTO: 10.5 FL
POCT GLUCOSE: 202 MG/DL (ref 70–110)
POCT GLUCOSE: 204 MG/DL (ref 70–110)
POCT GLUCOSE: 209 MG/DL (ref 70–110)
POCT GLUCOSE: 211 MG/DL (ref 70–110)
POCT GLUCOSE: 214 MG/DL (ref 70–110)
POTASSIUM SERPL-SCNC: 4.2 MMOL/L
RBC # BLD AUTO: 4.81 M/UL
SODIUM SERPL-SCNC: 134 MMOL/L
WBC # BLD AUTO: 12.35 K/UL

## 2017-04-06 PROCEDURE — 84100 ASSAY OF PHOSPHORUS: CPT

## 2017-04-06 PROCEDURE — 20600001 HC STEP DOWN PRIVATE ROOM

## 2017-04-06 PROCEDURE — 36415 COLL VENOUS BLD VENIPUNCTURE: CPT

## 2017-04-06 PROCEDURE — 85025 COMPLETE CBC W/AUTO DIFF WBC: CPT

## 2017-04-06 PROCEDURE — 83735 ASSAY OF MAGNESIUM: CPT

## 2017-04-06 PROCEDURE — 63600175 PHARM REV CODE 636 W HCPCS: Performed by: SURGERY

## 2017-04-06 PROCEDURE — 80048 BASIC METABOLIC PNL TOTAL CA: CPT

## 2017-04-06 PROCEDURE — 25000003 PHARM REV CODE 250: Performed by: SURGERY

## 2017-04-06 RX ADMIN — CARVEDILOL 12.5 MG: 12.5 TABLET, FILM COATED ORAL at 08:04

## 2017-04-06 RX ADMIN — INSULIN ASPART 4 UNITS: 100 INJECTION, SOLUTION INTRAVENOUS; SUBCUTANEOUS at 06:04

## 2017-04-06 RX ADMIN — ATORVASTATIN CALCIUM 80 MG: 20 TABLET, FILM COATED ORAL at 09:04

## 2017-04-06 RX ADMIN — IBUPROFEN 400 MG: 800 INJECTION INTRAVENOUS at 06:04

## 2017-04-06 RX ADMIN — ACETAMINOPHEN 1000 MG: 10 INJECTION, SOLUTION INTRAVENOUS at 05:04

## 2017-04-06 RX ADMIN — OXYCODONE HYDROCHLORIDE 10 MG: 5 TABLET ORAL at 10:04

## 2017-04-06 RX ADMIN — CARVEDILOL 12.5 MG: 12.5 TABLET, FILM COATED ORAL at 09:04

## 2017-04-06 RX ADMIN — INSULIN ASPART 4 UNITS: 100 INJECTION, SOLUTION INTRAVENOUS; SUBCUTANEOUS at 10:04

## 2017-04-06 RX ADMIN — ENOXAPARIN SODIUM 40 MG: 100 INJECTION SUBCUTANEOUS at 05:04

## 2017-04-06 RX ADMIN — IBUPROFEN 400 MG: 800 INJECTION INTRAVENOUS at 11:04

## 2017-04-06 RX ADMIN — IBUPROFEN 400 MG: 800 INJECTION INTRAVENOUS at 05:04

## 2017-04-06 RX ADMIN — INSULIN DETEMIR 25 UNITS: 100 INJECTION, SOLUTION SUBCUTANEOUS at 09:04

## 2017-04-06 RX ADMIN — INSULIN ASPART 4 UNITS: 100 INJECTION, SOLUTION INTRAVENOUS; SUBCUTANEOUS at 12:04

## 2017-04-06 NOTE — PLAN OF CARE
Problem: Patient Care Overview  Goal: Plan of Care Review  Outcome: Ongoing (interventions implemented as appropriate)  POC reviewed with patient. VSS. Patient tolerating diet; denied nausea and need for PRN pain medication. Accuchecks completed and covered as needed. Ambulating to bathroom, adequate urine output. Patient remained free from falls and trauma. Call light in reach. TM.

## 2017-04-06 NOTE — PLAN OF CARE
Pt is postop day #1, AA&Ox4, resting in bed, pt's spouse at bedside.     obtained assessment information from patient.  Educated pt on goals of the day, pt voiced understanding.  Anticipated discharge plan is for home with family support.  CM will continue to follow.    Pt's PCP is BRAD Baker MD    Pharmacy [facility] [address], [place] - [address] [facility]  [address] [facility] [place] [address]  Phone: [phone] Fax: [phone]    Payor - Payor: [place] [employer] / Plan: [employer] ([facility]) / Product Type: Commercial /         04/06/17 0900   Discharge Assessment   Assessment Type Discharge Planning Assessment   Confirmed/corrected address and phone number on facesheet? Yes   Assessment information obtained from? Patient;Medical Record   Prior to hospitilization cognitive status: Alert/Oriented   Prior to hospitalization functional status: Independent   Current cognitive status: Alert/Oriented   Current Functional Status: Needs Assistance   Arrived From home or self-care   Lives With spouse   How many people do you have in your home that can help with your care after discharge? 1   Who are your caregiver(s) and their phone number(s)? Wife, Toña Rivera, [phone]   Patient's perception of discharge disposition home or selfcare   Readmission Within The Last 30 Days no previous admission in last 30 days   Patient currently receives home health services? No   Does the patient currently use HME? No   Equipment Currently Used at Home none   Do you have any problems affording any of your prescribed medications? No   Is the patient taking medications as prescribed? yes   Do you have any financial concerns preventing you from receiving the healthcare you need? No   Does the patient have transportation to healthcare appointments? Yes   Transportation Available family or friend will provide   On Dialysis? No   Does the patient receive services  at the Coumadin Clinic? No   Discharge Plan A Home with family   Discharge Plan B Home with family;Home Health   Patient/Family In Agreement With Plan yes

## 2017-04-06 NOTE — NURSING TRANSFER
Nursing Transfer Note      4/6/2017     Transfer from PACU to 651    Transfer via stretcher    Transfer with pt belongings    Transported by RN    Medicines sent: insulin pen     Chart send with patient: yes    Notified: wife at bedside    Patient reassessed at: 4/5/17 @ 9691    Upon arrival to floor: AAOx4, VSS, IVF infusing. Oriented to room. Bed in lowest and locked position. Call light in reach. Will continue to monitor.

## 2017-04-06 NOTE — PROGRESS NOTES
GENERAL SURGERY  Progress Note    Hospital Day: 2  Admit Date: 4/5/2017    Date of Surgery:  4/5/2017  Post-Operative Day #:  1 Day Post-Op    Procedure:  Procedure(s) with comments:  SIGMOIDECTOMY-LAPAROSCOPIC (N/A) - [place]    SUBJECTIVE:     No acute events.  Pain moderately controlled with current meds, some soreness.  No nausea/vomiting     Current Medications:   acetaminophen  1,000 mg Intravenous Q8H    atorvastatin  80 mg Oral QHS    carvedilol  12.5 mg Oral BID    enoxparin  40 mg Subcutaneous Q24H    ibuprofen  400 mg Intravenous Q6H    insulin detemir  25 Units Subcutaneous QHS     Infusions:   sodium chloride 0.9% 100 mL/hr at 04/05/17 2423     PRN:dextrose 50%, dextrose 50%, diphenhydrAMINE, glucagon (human recombinant), glucose, glucose, HYDROmorphone, insulin aspart, ondansetron, oxycodone, oxycodone, promethazine (PHENERGAN) IVPB    Review of patient's allergies indicates:   Allergen Reactions    Penicillins Anaphylaxis    Shellfish containing products      Other reaction(s): Unknown    Vancomycin Itching    Bactrim  [sulfamethoxazole-trimethoprim] Rash       OBJECTIVE:     Most Recent Vitals:  Temp: 98.2 °F (36.8 °C) (04/06/17 0431)  Pulse: 80 (04/06/17 0431)  Resp: 16 (04/06/17 0431)  BP: 109/62 (04/06/17 0431)  SpO2: (!) 93 % (04/06/17 0431)    24-Hour Vitals Range:  Temp:  [97.5 °F (36.4 °C)-98.2 °F (36.8 °C)]   Pulse:  [64-81]   Resp:  [10-20]   BP: (109-157)/(62-84)   SpO2:  [92 %-100 %]     24-Hour I&O:  Intake/Output Summary (Last 24 hours) at 04/06/17 0701  Last data filed at 04/06/17 0431   Gross per 24 hour   Intake          3416.67 ml   Output             1225 ml   Net          2191.67 ml       PHYSICAL EXAM:  NAD  RRR  CTAB  Abd soft, obese, mildly distended, appropriately tender, incision site c/d/i with dermabond     ASSESSMENT:     Billy Rivera is a 51 y.o. male who is now 1 Day Post-Op s/p Procedure(s) with comments:  SIGMOIDECTOMY-LAPAROSCOPIC (N/A) -  [place]    PLAN:  · CLD  · D/C [place]  · Continue current pain meds  · Trend daily labs.  · OOB/ambulate.  · Prophylaxis:  DVT      Ilir Jiménez MD

## 2017-04-06 NOTE — PLAN OF CARE
Sw following for d/c needs. No d/c needs identified at this time. Sw will continue to follow.      Ami Barajas, GILMA [id number]

## 2017-04-06 NOTE — OP NOTE
DATE OF PROCEDURE:  04/05/2017    PREOPERATIVE DIAGNOSIS:  Malignant colon polyp.    POSTOPERATIVE DIAGNOSIS:  Malignant colon polyp.    PROCEDURES PERFORMED:  1.  Laparoscopic-assisted sigmoid colectomy.  2.  Laparoscopic-assisted splenic flexure mobilization.    SURGEON:  Simon Gomez M.D.    ASSISTANT:  Brian Hodges M.D. (RES).    ANESTHESIA:  General endotracheal.    INTRAVENOUS FLUIDS:  2 liters of crystalloid.    BLOOD LOSS:  100 mL.    URINE OUTPUT:  200 mL.    SPECIMENS:  1.  Sigmoid colon.  2.  Proximal anastomotic ring.  3.  Distal anastomotic ring.    DRAINS:  Butcher catheter.    COMPLICATIONS:  None.    OPERATIVE FINDINGS:  Residual polyp in the sigmoid colon, successful completion   of the colorectal anastomosis with no evidence of extravasation on air leak   testing.    INDICATIONS:  The patient is a 51-year-old male who had undergone a recent   screening colonoscopy at which point a sessile polyp had been partially removed  from the sigmoid colon.  This polyp had a very wide stalk.  The pathology from this  polyp a revealed focus of invasive carcinoma.  Review of pathology and discussion   of his case was performed at multidisciplinary Colorectal Conference and a   consensus recommendation was made to undergo a segmental colectomy given the   presence of malignancy within the polyp with a broad base.  The patient presents   today for elective sigmoid colectomy.    DESCRIPTION OF THE PROCEDURE:  The patient was identified, brought to the   Operating Room and placed on the table in a supine position after obtaining   informed consent.  Venous sequential stockings were placed.  He was given pre-  operative intravenous antibiotics and subcutaneous Heparin.  After induction of   general endotracheal anesthesia, he was repositioned in a modified lithotomy   position using Yellofin stirrups.  A Butcher catheter was placed using sterile   technique.  Rectal irrigation was performed.  The abdomen and  perineum were then   prepped and draped in the usual sterile fashion.  A 1 cm supraumbilical   incision was made through the skin, subcutaneous tissue and fascia to enter the   abdominal cavity.  Laparoscope was inserted to confirm intraperitoneal   placement.  We then performed a TAP block under direct laparoscopic   visualization using a combination of Exparel and 0.25% Marcaine diluted in   injectable saline.  We then desufflated the abdomen.  A 7.5 cm Pfannenstiel   incision was then made 2 fingerbreadths above the pubic tubercle.  This was   carried down through subcutaneous tissue.  The fascia was incised transversely   and fascial flaps were raised off the underlying rectus abdominis muscles   superiorly and inferiorly.  The rectus muscles were split in the midline and the   peritoneum was incised to gain access to the abdominal cavity.  The peritoneum   was opened along the entire length of the incision vertically and a Gelport was   placed.  The abdomen was reinsufflated.  We then placed 5 mm trocars under   direct visualization in the right upper quadrant and the left lower quadrant.    The patient was then placed in Trendelenburg and rotated to the right.  The   omentum was swept up over the transverse colon and the small bowel swept towards   the right upper quadrant.  We began incising the lateral attachments of the   left colon.  The white line of Toldt was incised and the colon and mesentery   were mobilized medially off of the retroperitoneum identifying and protecting   the gonadal vessels as well as the left ureter.  Full mobilization was carried   up to the level of splenic flexure.  We then entered the lesser sac through the   gastrocolic ligament,  the omentum from the distal half of the   transverse colon.  The splenic flexure was fully mobilized.  We then performed   wide division of the mesentery of the distal transverse colon, splenic flexure   and descending colon maintaining  adequate collateral flow through the marginal   artery.  Mobilization and division of the mesentery was carried distally to the   level of the pedicle of the inferior mesenteric artery.    At this point, the left colon had been sufficiently mobilized.  The abdomen was   desufflated.  The cap from the Gelport was removed and the mobilized colon was   delivered through the wound protector.  The proximal sigmoid colon was divided   with a MADDIE stapler.  The patient did have significant diverticular disease   present in the sigmoid colon, so we were sure to ensure that our proximal   division line was not in an area that appeared to be chronically diseased due   to diverticulosis.  The intervening mesentery was divided with a LigaSure   device.  We then packed the small bowel and the proximal colon into the upper   abdomen.  Working through the Gelport wound protector, we then isolated the   pedicle of the inferior mesenteric artery and divided it with a LigaSure device   again taking care to protect the left ureter and left gonadal vessels.  We then   mobilized the upper mesorectum off of the presacral fascia.  The lateral   peritoneal attachments and the peritoneal reflection were incised.  When we had   reached a point on the upper rectum, we divided the mesorectum with LigaSure   device.  The upper rectum was then stapled with a TA 45 stapler and sharply   divided proximal to the staple line.  I then opened the specimen on the back   table and was able to identify the residual polyp in the area of concern from   the prior polypectomy.    I then changed gowns and gloves and returned to the operative field.  We began   to prepare the distal end of the colon for anastomosis.  Extraneous epiploic fat   was cleared away and a Furness clamp was placed across the distal end of the   colon.  A Monosof pursestring suture was placed through the Furness clamp and   the distal staple line was sharply excised.  The anvil from a  29 mm EEA stapler   was inserted and secured with the pursestring suture.  We then placed the 29 mm   EEA stapler transanally up to the level of the rectal staple line.  The stapler   spike was advanced through the wall of the rectum adjacent to the staple line   and the stapler spike was engaged with the anvil in the distal end of the colon.    The stapler was closed and fired.  Upon removing the stapler, anastomotic   rings were inspected and noted to be intact.  We then performed flexible   endoscopy to visualize the anastomosis.  It appeared to be hemostatic and intact   circumferentially.  With manual compression of the colon proximal to the   anastomosis, there was adequate distention of the bowel with no extravasation of   air from the staple line when it was submerged in sterile irrigation.    We then copiously irrigated the pelvis.  Hemostasis was noted to be adequate.    Additional Exparel was infiltrated at all the surgical incision sites after   removing the trocars and the Gelport.  We then changed gowns and gloves and   brought the sterile closing tray to the field.  Seprafilm was placed between the   underlying bowel and the anterior abdominal wall at the site of the   Pfannenstiel incision.  We closed the Pfannenstiel incision first with a running   3-0 Vicryl to reapproximate the peritoneum.  The rectus abdominis muscles were   reapproximated in the midline with interrupted 3-0 Vicryl figure-of-8 sutures.    The fascia was closed transversely with a running #1 PDS suture.  The fascial   defect at the supraumbilical trocar site was closed with a single 0 Vicryl   figure-of-8 suture.  The subcutaneous tissues were irrigated.  Hemostasis was   noted to be adequate.  All incisions were then closed at the skin level with 4-0   Monocryl subcuticular closure and Dermabond was placed as a dressing.    The patient tolerated the procedure well with no complications.  He was   extubated in the Operating Room  and taken to Recovery in satisfactory condition.    All needle, instrument and sponge counts were correct at the end of the case.    I was present throughout the entire procedure.      TRIXIE  dd: 04/06/2017 11:52:00 (CDT)  td: 04/06/2017 13:04:45 (CDT)  Doc ID   #[id number]  Job ID #[id number]    CC:

## 2017-04-07 LAB
ANION GAP SERPL CALC-SCNC: 8 MMOL/L
BASOPHILS # BLD AUTO: 0.02 K/UL
BASOPHILS NFR BLD: 0.2 %
BUN SERPL-MCNC: 12 MG/DL
CALCIUM SERPL-MCNC: 7.9 MG/DL
CHLORIDE SERPL-SCNC: 104 MMOL/L
CO2 SERPL-SCNC: 23 MMOL/L
CREAT SERPL-MCNC: 0.8 MG/DL
DIFFERENTIAL METHOD: ABNORMAL
EOSINOPHIL # BLD AUTO: 0.1 K/UL
EOSINOPHIL NFR BLD: 0.6 %
ERYTHROCYTE [DISTWIDTH] IN BLOOD BY AUTOMATED COUNT: 13.1 %
EST. GFR  (AFRICAN AMERICAN): >60 ML/MIN/1.73 M^2
EST. GFR  (NON AFRICAN AMERICAN): >60 ML/MIN/1.73 M^2
GLUCOSE SERPL-MCNC: 193 MG/DL
HCT VFR BLD AUTO: 38 %
HGB BLD-MCNC: 13.4 G/DL
LYMPHOCYTES # BLD AUTO: 1.2 K/UL
LYMPHOCYTES NFR BLD: 10.2 %
MAGNESIUM SERPL-MCNC: 1.7 MG/DL
MCH RBC QN AUTO: 29.8 PG
MCHC RBC AUTO-ENTMCNC: 35.3 %
MCV RBC AUTO: 84 FL
MONOCYTES # BLD AUTO: 1.4 K/UL
MONOCYTES NFR BLD: 12.3 %
NEUTROPHILS # BLD AUTO: 8.7 K/UL
NEUTROPHILS NFR BLD: 76.3 %
PHOSPHATE SERPL-MCNC: 2 MG/DL
PLATELET # BLD AUTO: 175 K/UL
PMV BLD AUTO: 10.1 FL
POCT GLUCOSE: 187 MG/DL (ref 70–110)
POCT GLUCOSE: 197 MG/DL (ref 70–110)
POCT GLUCOSE: 213 MG/DL (ref 70–110)
POCT GLUCOSE: 215 MG/DL (ref 70–110)
POCT GLUCOSE: 234 MG/DL (ref 70–110)
POTASSIUM SERPL-SCNC: 4.1 MMOL/L
RBC # BLD AUTO: 4.5 M/UL
SODIUM SERPL-SCNC: 135 MMOL/L
WBC # BLD AUTO: 11.42 K/UL

## 2017-04-07 PROCEDURE — 36415 COLL VENOUS BLD VENIPUNCTURE: CPT

## 2017-04-07 PROCEDURE — 80048 BASIC METABOLIC PNL TOTAL CA: CPT

## 2017-04-07 PROCEDURE — 20600001 HC STEP DOWN PRIVATE ROOM

## 2017-04-07 PROCEDURE — 84100 ASSAY OF PHOSPHORUS: CPT

## 2017-04-07 PROCEDURE — 25000003 PHARM REV CODE 250: Performed by: SURGERY

## 2017-04-07 PROCEDURE — 83735 ASSAY OF MAGNESIUM: CPT

## 2017-04-07 PROCEDURE — 63600175 PHARM REV CODE 636 W HCPCS: Performed by: SURGERY

## 2017-04-07 PROCEDURE — 85025 COMPLETE CBC W/AUTO DIFF WBC: CPT

## 2017-04-07 PROCEDURE — 25000003 PHARM REV CODE 250: Performed by: STUDENT IN AN ORGANIZED HEALTH CARE EDUCATION/TRAINING PROGRAM

## 2017-04-07 RX ORDER — LISINOPRIL 20 MG/1
20 TABLET ORAL DAILY
Status: DISCONTINUED | OUTPATIENT
Start: 2017-04-07 | End: 2017-04-08 | Stop reason: HOSPADM

## 2017-04-07 RX ADMIN — SODIUM CHLORIDE: 0.9 INJECTION, SOLUTION INTRAVENOUS at 04:04

## 2017-04-07 RX ADMIN — INSULIN DETEMIR 25 UNITS: 100 INJECTION, SOLUTION SUBCUTANEOUS at 08:04

## 2017-04-07 RX ADMIN — INSULIN ASPART 2 UNITS: 100 INJECTION, SOLUTION INTRAVENOUS; SUBCUTANEOUS at 09:04

## 2017-04-07 RX ADMIN — CARVEDILOL 12.5 MG: 12.5 TABLET, FILM COATED ORAL at 08:04

## 2017-04-07 RX ADMIN — INSULIN ASPART 1 UNITS: 100 INJECTION, SOLUTION INTRAVENOUS; SUBCUTANEOUS at 12:04

## 2017-04-07 RX ADMIN — LISINOPRIL 20 MG: 20 TABLET ORAL at 09:04

## 2017-04-07 RX ADMIN — IBUPROFEN 400 MG: 800 INJECTION INTRAVENOUS at 12:04

## 2017-04-07 RX ADMIN — CARVEDILOL 12.5 MG: 12.5 TABLET, FILM COATED ORAL at 09:04

## 2017-04-07 RX ADMIN — INSULIN ASPART 4 UNITS: 100 INJECTION, SOLUTION INTRAVENOUS; SUBCUTANEOUS at 03:04

## 2017-04-07 RX ADMIN — IBUPROFEN 400 MG: 800 INJECTION INTRAVENOUS at 05:04

## 2017-04-07 RX ADMIN — ENOXAPARIN SODIUM 40 MG: 100 INJECTION SUBCUTANEOUS at 05:04

## 2017-04-07 RX ADMIN — INSULIN ASPART 4 UNITS: 100 INJECTION, SOLUTION INTRAVENOUS; SUBCUTANEOUS at 12:04

## 2017-04-07 RX ADMIN — ATORVASTATIN CALCIUM 80 MG: 20 TABLET, FILM COATED ORAL at 08:04

## 2017-04-07 NOTE — PLAN OF CARE
No d/c needs identified at this time. Sw will continue to follow.      Ami Barajas, MARTIR [id number]

## 2017-04-07 NOTE — PLAN OF CARE
Problem: Patient Care Overview  Goal: Plan of Care Review  Outcome: Ongoing (interventions implemented as appropriate)  POC reviewed with patient, who verbalized understanding. AAOx4. Remains free of falls and injury. VSS. x3 lap sites with one transverse lower abdominal, secured with dermabond, DAWOOD. Tolerating Clear liquid diet. Denies nausea and mild pain relieved with PRN meds. IVF infusing, voiding per urinal. Up ad sylvia, ambulated the germain this AM. Blood Glucose monitor ACHS slide 151. TEDS and SCDS on. No acute events. No distress noted. Call bell in reach. Will continue to monitor.

## 2017-04-07 NOTE — PLAN OF CARE
Problem: Patient Care Overview  Goal: Plan of Care Review  POC reviewed with pt. pt verbalized understanding. AAOx4. Remains free of falls and injury. VS WNL. Wounds c/d/i w/o s/sx of infection. Tolerating low res low fiber diet. Pt denies pain, nausea. Pt refused ibprofen today. Voiding per bathroom. Up independently. Blood glucose elevated. inst pt on low sugar diet. SCDS in place. No acute events. No distress noted. Call bell in reach. Will continue to monitor.

## 2017-04-07 NOTE — PLAN OF CARE
Pt is postop day #2, progressing.  MD advancing pt's diet, will monitor intake & toleration.    Anticipate pt will discharge home this weekend.  No HME or Home Health care need.  Pt has support of his spouse.    Postop appointment Scheduled:  Future Appointments  Date Time Provider Department Center   4/24/2017 8:30 AM Simon Gomez MD [facility]          04/07/17 1018   Final Note   Assessment Type Final Discharge Note   Discharge Disposition Home   Discharge planning education complete? Yes   Hospital Follow Up  Appt(s) scheduled? Yes   Discharge plans and expectations educations in teach back method with documentation complete? Yes   Offered [facility] -- Bedside Delivery? n/a   Discharge/Hospital Encounter Summary to (non-[facility]) PCP n/a   Referral to Outpatient Case Management complete? n/a   Referral to / orders for Home Health Complete? n/a   30 day supply of medicines given at discharge, if documented non-compliance / non-adherence? n/a   Any social issues identified prior to discharge? No   Did you assess the readiness or willingness of the family or caregiver to support self management of care? No

## 2017-04-07 NOTE — PROGRESS NOTES
GENERAL SURGERY  Progress Note    Hospital Day: 3  Admit Date: 4/5/2017    Date of Surgery:  4/5/2017  Post-Operative Day #:  2 Days Post-Op    Procedure:  Procedure(s) with comments:  SIGMOIDECTOMY-LAPAROSCOPIC (N/A) - Thony    SUBJECTIVE:     No acute events.  Pain well controlled. Ambulating. + flatus and BM. Tolerating CLD with no nausea or emesis.     Current Medications:   atorvastatin  80 mg Oral QHS    carvedilol  12.5 mg Oral BID    enoxparin  40 mg Subcutaneous Q24H    ibuprofen  400 mg Intravenous Q6H    insulin detemir  25 Units Subcutaneous QHS    lisinopril  20 mg Oral Daily     Infusions:     PRN:dextrose 50%, dextrose 50%, diphenhydrAMINE, glucagon (human recombinant), glucose, glucose, HYDROmorphone, insulin aspart, ondansetron, oxycodone, oxycodone, promethazine (PHENERGAN) IVPB    Review of patient's allergies indicates:   Allergen Reactions    Penicillins Anaphylaxis    Shellfish containing products      Other reaction(s): Unknown    Vancomycin Itching    Bactrim  [sulfamethoxazole-trimethoprim] Rash       OBJECTIVE:     Most Recent Vitals:  Temp: 97.5 °F (36.4 °C) (04/07/17 0425)  Pulse: 69 (04/07/17 0425)  Resp: 16 (04/07/17 0425)  BP: 132/79 (04/07/17 0425)  SpO2: (!) 94 % (04/07/17 0425)    24-Hour Vitals Range:  Temp:  [96.9 °F (36.1 °C)-99.1 °F (37.3 °C)]   Pulse:  [69-80]   Resp:  [16-18]   BP: (123-140)/(74-79)   SpO2:  [92 %-95 %]     24-Hour I&O:    Intake/Output Summary (Last 24 hours) at 04/07/17 0755  Last data filed at 04/07/17 0633   Gross per 24 hour   Intake             3830 ml   Output             1000 ml   Net             2830 ml       PHYSICAL EXAM:  NAD  RRR  CTAB  Abd soft, obese, mildly distended, appropriately tender, incision site c/d/i with dermabond     ASSESSMENT:     Billy Rivera is a 51 y.o. male who is now 2 Days Post-Op s/p Procedure(s) with comments:  SIGMOIDECTOMY-LAPAROSCOPIC (N/A) - Thony    PLAN:  · Low residue diet  · Continue  current pain meds  · Trend daily labs  · DC IVF  · OOB/ambulate.  · Home meds  · Prophylaxis:  DVT  · Expect discharge over weekend      Brian Hodges MD

## 2017-04-08 VITALS
HEART RATE: 72 BPM | SYSTOLIC BLOOD PRESSURE: 134 MMHG | OXYGEN SATURATION: 97 % | BODY MASS INDEX: 35.94 KG/M2 | TEMPERATURE: 97 F | WEIGHT: 280 LBS | HEIGHT: 74 IN | DIASTOLIC BLOOD PRESSURE: 85 MMHG | RESPIRATION RATE: 18 BRPM

## 2017-04-08 LAB
ANION GAP SERPL CALC-SCNC: 11 MMOL/L
BASOPHILS # BLD AUTO: 0.01 K/UL
BASOPHILS NFR BLD: 0.1 %
BUN SERPL-MCNC: 11 MG/DL
CALCIUM SERPL-MCNC: 8 MG/DL
CHLORIDE SERPL-SCNC: 102 MMOL/L
CO2 SERPL-SCNC: 22 MMOL/L
CREAT SERPL-MCNC: 0.8 MG/DL
DIFFERENTIAL METHOD: ABNORMAL
EOSINOPHIL # BLD AUTO: 0.1 K/UL
EOSINOPHIL NFR BLD: 1 %
ERYTHROCYTE [DISTWIDTH] IN BLOOD BY AUTOMATED COUNT: 13 %
EST. GFR  (AFRICAN AMERICAN): >60 ML/MIN/1.73 M^2
EST. GFR  (NON AFRICAN AMERICAN): >60 ML/MIN/1.73 M^2
GLUCOSE SERPL-MCNC: 204 MG/DL
HCT VFR BLD AUTO: 36.9 %
HGB BLD-MCNC: 13.1 G/DL
LYMPHOCYTES # BLD AUTO: 1.3 K/UL
LYMPHOCYTES NFR BLD: 13.8 %
MAGNESIUM SERPL-MCNC: 1.5 MG/DL
MCH RBC QN AUTO: 30 PG
MCHC RBC AUTO-ENTMCNC: 35.5 %
MCV RBC AUTO: 84 FL
MONOCYTES # BLD AUTO: 1.3 K/UL
MONOCYTES NFR BLD: 13.9 %
NEUTROPHILS # BLD AUTO: 6.8 K/UL
NEUTROPHILS NFR BLD: 70.8 %
PHOSPHATE SERPL-MCNC: 2.5 MG/DL
PLATELET # BLD AUTO: 167 K/UL
PMV BLD AUTO: 10 FL
POCT GLUCOSE: 199 MG/DL (ref 70–110)
POTASSIUM SERPL-SCNC: 3.4 MMOL/L
RBC # BLD AUTO: 4.37 M/UL
SODIUM SERPL-SCNC: 135 MMOL/L
WBC # BLD AUTO: 9.61 K/UL

## 2017-04-08 PROCEDURE — 83735 ASSAY OF MAGNESIUM: CPT

## 2017-04-08 PROCEDURE — 25000003 PHARM REV CODE 250: Performed by: SURGERY

## 2017-04-08 PROCEDURE — 36415 COLL VENOUS BLD VENIPUNCTURE: CPT

## 2017-04-08 PROCEDURE — 85025 COMPLETE CBC W/AUTO DIFF WBC: CPT

## 2017-04-08 PROCEDURE — 80048 BASIC METABOLIC PNL TOTAL CA: CPT

## 2017-04-08 PROCEDURE — 84100 ASSAY OF PHOSPHORUS: CPT

## 2017-04-08 PROCEDURE — 25000003 PHARM REV CODE 250: Performed by: STUDENT IN AN ORGANIZED HEALTH CARE EDUCATION/TRAINING PROGRAM

## 2017-04-08 RX ORDER — IBUPROFEN 800 MG/1
800 TABLET ORAL 3 TIMES DAILY PRN
Qty: 50 TABLET | Refills: 0 | Status: ON HOLD | OUTPATIENT
Start: 2017-04-08 | End: 2020-07-06 | Stop reason: HOSPADM

## 2017-04-08 RX ORDER — OXYCODONE AND ACETAMINOPHEN 5; 325 MG/1; MG/1
1 TABLET ORAL EVERY 6 HOURS PRN
Qty: 50 TABLET | Refills: 0 | Status: SHIPPED | OUTPATIENT
Start: 2017-04-08 | End: 2018-02-22

## 2017-04-08 RX ADMIN — CARVEDILOL 12.5 MG: 12.5 TABLET, FILM COATED ORAL at 08:04

## 2017-04-08 RX ADMIN — INSULIN ASPART 2 UNITS: 100 INJECTION, SOLUTION INTRAVENOUS; SUBCUTANEOUS at 08:04

## 2017-04-08 RX ADMIN — LISINOPRIL 20 MG: 20 TABLET ORAL at 08:04

## 2017-04-08 NOTE — PLAN OF CARE
Problem: Patient Care Overview  Goal: Plan of Care Review  Outcome: Ongoing (interventions implemented as appropriate)  POC reviewed with patient, who verbalized understanding. AAOx4. Remains free of falls and injury. VSS. x3 lap sites with one transverse lower abdominal, secured with dermabond, DAWOOD. Tolerating Low Fiber/Low Res. Denies nausea and pain. Voiding per urinal. Up ad sylvia, ambulated the halls. Blood Glucose monitor ACHS slide 151. TEDS and SCDS on. No acute events. No distress noted. Call bell in reach. Will continue to monitor.

## 2017-04-08 NOTE — PROGRESS NOTES
Reviewed discharge instructions with patient and patient verbalized understanding. Discussed incisional care, diet, lifting restrictions, new prescriptions, and continuing home prescriptions. All questions related to care were answered. VSS, afebrile, and free of falls and trauma. Lap sites and transverse incision c/d/i. IVs removed and catheters intact. Patient awaiting ride home and refused transport. Will inform  when he is leaving.

## 2017-04-08 NOTE — DISCHARGE SUMMARY
[facility]  DISCHARGE SUMMARY  General Surgery      Admit Date:  4/5/2017    Discharge Date and Time:  4/8/2017  12:00 PM    Attending Physician:  Simon Gomez MD     Discharge Provider:  Ilir Jiménez MD     Reason for Admission:  Colon cancer     Procedures Performed:  Procedure(s) (LRB):  SIGMOIDECTOMY-LAPAROSCOPIC (N/A)    Hospital Course:  Please see the preoperative H&P and other available documentation for full details related to history prior to this admission.  Briefly, Billy Rivera is a 51 y.o. male who was admitted following scheduled elective surgery for Colon cancer    Following a complete preoperative discussion of the risks and benefits of surgery with signed informed consent, the patient was taken to the operating room on 4/5/2017 and underwent the above stated procedures.  The patient tolerated surgery well and there were no complications.  Please see the operative report for full intraoperative findings and details.  Postoperatively, the patient did well and was transferred from the PACU to the floor in stable condition where they had a stable and uncomplicated hospital course.  Labs and vital signs remained stable and appropriate throughout course.  Diet was advanced as tolerated and the patient's pain was controlled on oral pain medications without problem.  Currently, the patient is doing well at 3 Days Post-Op and is stable and appropriate for discharge home at this time.      Consults:  None.    Significant Diagnostic Studies:     Recent Labs  Lab 04/06/17  0554 04/07/17  0708 04/08/17  0551   WBC 12.35 11.42 9.61   HGB 14.3 13.4* 13.1*   HCT 40.4 38.0* 36.9*    175 167     Recent Labs  Lab 04/06/17  0554 04/07/17  0708 04/08/17  0551   * 135* 135*   K 4.2 4.1 3.4*    104 102   CO2 22* 23 22*   BUN 15 12 11   CREATININE 0.8 0.8 0.8   * 193* 204*   CALCIUM 7.5* 7.9* 8.0*   MG 1.5* 1.7 1.5*   PHOS 3.3 2.0* 2.5*   No results for input(s):  INR, PTT, LABHEPA, LACTATE, TROPONINI, CPK, CPKMB, MB, BNP in the last 72 hours.No results for input(s): PH, PCO2, PO2, HCO3 in the last 72 hours.      Final Diagnoses:   Principal Problem:  Colon cancer   Secondary Diagnoses:    Active Hospital Problems    Diagnosis  POA    *Colon cancer [C18.9]  Yes      Resolved Hospital Problems    Diagnosis Date Resolved POA   No resolved problems to display.       Discharged Condition:  Good    Disposition:  Home or Self Care    Follow Up/Patient Instructions:     Medications:  Reconciled Home Medications:  Current Discharge Medication List      START taking these medications    Details   ibuprofen (ADVIL,MOTRIN) 800 MG tablet Take 1 tablet (800 mg total) by mouth 3 (three) times daily as needed for Pain.  Qty: 50 tablet, Refills: 0      oxycodone-acetaminophen (PERCOCET) 5-325 mg per tablet Take 1 tablet by mouth every 6 (six) hours as needed for Pain.  Qty: 50 tablet, Refills: 0         CONTINUE these medications which have NOT CHANGED    Details   aspirin 81 MG Chew Take 81 mg by mouth every evening. swallows      atorvastatin (LIPITOR) 80 MG tablet Take 1 tablet (80 mg total) by mouth once daily.  Qty: 30 tablet, Refills: 6      blood sugar diagnostic Strp Strips and lancets    Use before meals and bedtime  Qty: 150 strip, Refills: 12      carvedilol (COREG) 12.5 MG tablet Take 1 tablet (12.5 mg total) by mouth 2 (two) times daily.  Qty: 60 tablet, Refills: 3      insulin aspart (NOVOLOG FLEXPEN) 100 unit/mL InPn pen Inject 20 units with meals + correction scale goal 150, ISF 25- TDD 90  Qty: 2 Box, Refills: 3    Associated Diagnoses: Type 2 diabetes mellitus with hyperglycemia, with long-term current use of insulin      insulin glargine (LANTUS SOLOSTAR) 100 unit/mL (3 mL) InPn pen INJECT 52 UNITS SUBCUTANEOUSLY IN THE EVENING  Qty: 2 Box, Refills: 3    Associated Diagnoses: Type 2 diabetes mellitus with hyperglycemia, with long-term current use of insulin     "  lisinopril (PRINIVIL,ZESTRIL) 20 MG tablet Take 1 tablet (20 mg total) by mouth once daily.  Qty: 30 tablet, Refills: 6      metformin (GLUCOPHAGE) 1000 MG tablet Take 1 tablet (1,000 mg total) by mouth 2 (two) times daily with meals.  Qty: 60 tablet, Refills: 6    Associated Diagnoses: Type 2 diabetes mellitus with hyperglycemia, with long-term current use of insulin      pen needle, diabetic 31 gauge x 3/16" Ndle Inject 1 each into the skin 3 (three) times daily before meals.  Qty: 100 each, Refills: 12    Associated Diagnoses: Type 2 diabetes mellitus without complication, with long-term current use of insulin      nitroGLYCERIN (NITROSTAT) 0.4 MG SL tablet Place 1 tablet (0.4 mg total) under the tongue every 5 (five) minutes as needed for Chest pain.  Qty: 30 tablet, Refills: 1    Associated Diagnoses: Coronary artery disease involving native coronary artery without angina pectoris             Discharge Procedure Orders  Diet general     Other restrictions (specify):   Order Comments: OK to shower, no bath.  No lifting heavier than 10lbs until seen back in clinic     Call MD for:  temperature >100.4     Call MD for:  persistent nausea and vomiting or diarrhea     Call MD for:  severe uncontrolled pain     Call MD for:  redness, tenderness, or signs of infection (pain, swelling, redness, odor or green/yellow discharge around incision site)       Follow-up Information     Follow up with Simon Gomez MD. Go on 4/24/2017.    Specialty:  Colon and Rectal Surgery    Why:  PostOp Follow-up appt    Contact information:    [address] [facility] [address]  [phone]            Ilir Jiménez MD    "

## 2017-04-11 RX ORDER — LISINOPRIL 20 MG/1
TABLET ORAL
Qty: 30 TABLET | Refills: 6 | Status: SHIPPED | OUTPATIENT
Start: 2017-04-11 | End: 2017-12-23 | Stop reason: SDUPTHER

## 2017-04-21 ENCOUNTER — TELEPHONE (OUTPATIENT)
Dept: SURGERY | Facility: CLINIC | Age: 52
End: 2017-04-21

## 2017-04-21 NOTE — TELEPHONE ENCOUNTER
Spoke with . Reports scant serosanguinous drainage from incision. Denies fever, pain, redness, odor or any other issues or concerns. Instructed to call clinic back if any signs of infection occur. Patient verbalized understanding of information provided.

## 2017-04-21 NOTE — TELEPHONE ENCOUNTER
----- Message from Marvin Arita sent at 4/21/2017  8:43 AM CDT -----  Contact: Pt wife:[phone]  Pt wife called and called and states her  had surgery and the incision is draining fluid and pt wife is concerned. Pt wife states she would like to speak with the nurse in regards to coming in for an appointment today or she would like to know if this is normal.

## 2017-04-24 ENCOUNTER — OFFICE VISIT (OUTPATIENT)
Dept: SURGERY | Facility: CLINIC | Age: 52
End: 2017-04-24
Payer: COMMERCIAL

## 2017-04-24 VITALS
WEIGHT: 272.06 LBS | SYSTOLIC BLOOD PRESSURE: 112 MMHG | HEIGHT: 74 IN | BODY MASS INDEX: 34.91 KG/M2 | DIASTOLIC BLOOD PRESSURE: 72 MMHG | HEART RATE: 59 BPM

## 2017-04-24 DIAGNOSIS — C18.7 MALIGNANT NEOPLASM OF SIGMOID COLON: Primary | ICD-10-CM

## 2017-04-24 PROCEDURE — 99999 PR PBB SHADOW E&M-EST. PATIENT-LVL III: CPT | Mod: PBBFAC,,, | Performed by: COLON & RECTAL SURGERY

## 2017-04-24 PROCEDURE — 99024 POSTOP FOLLOW-UP VISIT: CPT | Mod: S$GLB,,, | Performed by: COLON & RECTAL SURGERY

## 2017-04-24 NOTE — PROGRESS NOTES
"Billy Rivera is a 51 y.o. male patient.   No diagnosis found.  Past Medical History:   Diagnosis Date    Allergy     CAD (coronary artery disease), native coronary artery 6/25/2013    Cancer     Diabetes mellitus     Diabetes mellitus, type 2     Disorder of kidney and ureter     Heart attack 04/2012    Hyperlipidemia     Hypertension     Muscular pain     post-op after colonoscopy     Past Surgical History Pertinent Negatives:   Procedure Date Noted    ABDOMINAL AORTIC ANEURYSM REPAIR 06/25/2013    ANGIOPLASTY 06/25/2013    ASD REPAIR 06/25/2013    CARDIAC CATHETERIZATION 06/25/2013    CARDIAC VALVE SURGERY 06/25/2013    CAROTID ENDARTERECTOMY 06/25/2013    CORONARY ARTERY BYPASS GRAFT 06/25/2013    INSERT / REPLACE / REMOVE PACEMAKER 06/25/2013    RADIOFREQUENCY ABLATION 06/25/2013    VASCULAR SURGERY 06/25/2013    VSD REPAIR 06/25/2013       Review of patient's allergies indicates:   Allergen Reactions    Penicillins Anaphylaxis    Shellfish containing products      Other reaction(s): Unknown    Vancomycin Itching    Bactrim  [sulfamethoxazole-trimethoprim] Rash       Blood pressure 112/72, pulse (!) 59, height 6' 2" (1.88 m), weight 123.4 kg (272 lb 0.8 oz).    Subjective   Mr. Rivera is a 52yo M s/p laparoscopic sigmoid colectomy for invasive cancer in sigmoid polyp. He is doing well, tolerating diet, regular bowel movements and pain controlled. His only complaint is some wound drainage. Afebrile.    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 Hodges, and I concur with her/his documentation of Billy Rivera.  See below addendum for my evaluation and additional findings.    +seroma at Pfannensteil incision drained & packed in office.  Otherwise doing well.  Path showed no residual cancer, 19 nodes negative  OK to RTW  RTO 2-3 weeks for wound check  Colonoscopy 1 year post-op.    Simon Gomez MD, FACS, FASCRS

## 2017-04-24 NOTE — LETTER
April 24, 2017        DRE Baker MD  [address]  [place] [address]             [facility]  [address]  [facility] [address]  Phone: [phone]   Patient: Billy Rivera   MR Number: [id number]   YOB: 1965   Date of Visit: 4/24/2017       Dear Dr. Baker:    Thank you for referring Billy Rivera to me for evaluation. Attached you will find relevant portions of my assessment and plan of care.    If you have questions, please do not hesitate to call me. I look forward to following Billy Rivera along with you.    Sincerely,      Simon Gomez MD            CC  No Recipients    Enclosure

## 2017-04-28 ENCOUNTER — TELEPHONE (OUTPATIENT)
Dept: SURGERY | Facility: CLINIC | Age: 52
End: 2017-04-28

## 2017-04-28 NOTE — TELEPHONE ENCOUNTER
Wife is very concerned about pt's wound. She says it is draining and is getting red around incision. Told her I made an appt for him at 2pm to see out NP. Pt is not going to come in. He believes the red is from the tape and it is barely draining. He will keep appt Monday with Dr Gomez.

## 2017-04-28 NOTE — TELEPHONE ENCOUNTER
----- Message from Marvin Arita sent at 4/28/2017 10:52 AM CDT -----  Contact: Pt wife:[phone]  Pt wife called and states the pt wound is red and is leaking unusual fluids with a yellow color.

## 2017-05-01 ENCOUNTER — OFFICE VISIT (OUTPATIENT)
Dept: SURGERY | Facility: CLINIC | Age: 52
End: 2017-05-01
Payer: COMMERCIAL

## 2017-05-01 VITALS
BODY MASS INDEX: 35.3 KG/M2 | SYSTOLIC BLOOD PRESSURE: 130 MMHG | DIASTOLIC BLOOD PRESSURE: 73 MMHG | WEIGHT: 274.94 LBS | HEART RATE: 66 BPM

## 2017-05-01 DIAGNOSIS — C18.7 MALIGNANT NEOPLASM OF SIGMOID COLON: Primary | ICD-10-CM

## 2017-05-01 PROCEDURE — 99024 POSTOP FOLLOW-UP VISIT: CPT | Mod: S$GLB,,, | Performed by: COLON & RECTAL SURGERY

## 2017-05-01 PROCEDURE — 99999 PR PBB SHADOW E&M-EST. PATIENT-LVL III: CPT | Mod: PBBFAC,,, | Performed by: COLON & RECTAL SURGERY

## 2017-05-01 RX ORDER — METRONIDAZOLE 500 MG/1
500 TABLET ORAL EVERY 8 HOURS
Qty: 21 TABLET | Refills: 0 | Status: SHIPPED | OUTPATIENT
Start: 2017-05-01 | End: 2017-05-08

## 2017-05-01 RX ORDER — CIPROFLOXACIN 500 MG/1
500 TABLET ORAL 2 TIMES DAILY
Qty: 14 TABLET | Refills: 0 | Status: SHIPPED | OUTPATIENT
Start: 2017-05-01 | End: 2017-05-08

## 2017-05-01 NOTE — PROGRESS NOTES
CRS Post-operative visit    Visit Info:     Procedure: Laparoscopic-assisted sigmoid colectomy.    Date of Procedure: April 5, 2017    Indication: 52 yo M, underwent asymptomatic screening colonoscopy 2/17/17:     -Diverticulosis in the sigmoid colon and in the descending colon.  -5 mm polyp in the cecum, removed with a cold snare - hyperplastic  -Granularity at the appendiceal orifice. Biopsied - benign mucosa with lymphoid aggregates  -4 mm polyp in the ascending colon, removed with a hot snare - hyperplastic  -6 mm polyp at the hepatic flexure, removed with a hot snare - hyperplastic  -10 mm pedunculated polyp with a very broad based stalk in the sigmoid colon, removed with a hot snare - INVASIVE ADENOCARCINOMA ARISING FROM AN ADENOMATOUS POLYP WITH HIGH-GRADE DYSPLASIA. (Note: Tumor focally extends through the muscularis mucosa)  -Two 2 to 3 mm polyps at the recto-sigmoid colon, removed with a jumbo cold forceps - hyperplastic/     Further comments regarding the malignant polyp from pathologist: The tumor appears of intermediate differentiation and is focally present at the edges of the biopsies. Although a small margin is demonstrated in one well oriented section, adequate margins are not demonstrated in the other sections submitted. A polyp stalk is not identified within the specimen. Lymphovascular invasion is not identified.    CT A/P showed no evidence of metastatic disease.    His case was reviewed at CRS multi-disciplinary conference and consensus was to proceed with segmental colectomy.    Date of Discharge: April 8, 2017    Current Status: At 1st POV 4/24/17 he was doing well aside from clear drainage from his Pfannensteil incision - wound was opened slightly and seroma drained.  He returns today with increasing swelling and drainage from the incision.  No F/C.  Eating with no N/V.   BM's normal.  Minimal pain.    Pathology:   NO RESIDUAL INFILTRATING CARCINOMA IN THE ENTIRELY SUBMITTED INDICATED  REGION  FOCUS OF RESIDUAL ADENOMATOUS TRANSFORMATION  19 LYMPH NODE WITH NO METASTATIC CARCINOMA IDENTIFIED  DIVERTICULOSIS WITH FOCI OF ACUTE AND CHRONIC DIVERTICULITIS    Physical Exam:  General: White male in NAD   Neuro: aaox4   Respiratory: resps even unlabored  Abdomen: SOft, NT/ND, lap trocar incisions well healed, +induration/erythema around Pfannenstiel incision - opening at left lateral aspect probed and entered into a large SQ pocket containing a moderate volume of seropurulent fluid - wound cavity drained, irrigated, packed  Extremities: Warm dry and intact    Assessment:  Malignant colon polyp removed endoscopically, no residual cancer s/p lap sigmoid colectomy  Superficial wound infection    Plan:  Change wound packing daily  Diet & activity as tolerated  Cipro/Flagyl x 1 week  RTO 1 week for wound check    Simon Gomez MD, FACS, FASCRS

## 2017-05-01 NOTE — LETTER
May 1, 2017        DRE Baker MD  [address] [facility] [place]  [place] [address]             [facility]  [address]  [facility] [address]  Phone: [phone]   Patient: Billy Rivera   MR Number: [id number]   YOB: 1965   Date of Visit: 5/1/2017       Dear Dr. Baker:    Thank you for referring Billy Rivera to me for evaluation. Attached you will find relevant portions of my assessment and plan of care.    If you have questions, please do not hesitate to call me. I look forward to following Billy Rivera along with you.    Sincerely,      Simon Gomez MD            CC  No Recipients    Enclosure

## 2017-05-08 ENCOUNTER — OFFICE VISIT (OUTPATIENT)
Dept: SURGERY | Facility: CLINIC | Age: 52
End: 2017-05-08
Payer: COMMERCIAL

## 2017-05-08 VITALS — WEIGHT: 274.94 LBS | SYSTOLIC BLOOD PRESSURE: 135 MMHG | BODY MASS INDEX: 35.3 KG/M2 | DIASTOLIC BLOOD PRESSURE: 84 MMHG

## 2017-05-08 DIAGNOSIS — C18.7 MALIGNANT NEOPLASM OF SIGMOID COLON: Primary | ICD-10-CM

## 2017-05-08 PROCEDURE — 99999 PR PBB SHADOW E&M-EST. PATIENT-LVL II: CPT | Mod: PBBFAC,,, | Performed by: COLON & RECTAL SURGERY

## 2017-05-08 PROCEDURE — 99024 POSTOP FOLLOW-UP VISIT: CPT | Mod: S$GLB,,, | Performed by: COLON & RECTAL SURGERY

## 2017-05-08 NOTE — LETTER
May 8, 2017        DRE Baker MD  [address] [facility] [place]  [place] [address]             [facility]  [address]  [facility] [address]  Phone: [phone]   Patient: Billy Rivera   MR Number: [id number]   YOB: 1965   Date of Visit: 5/8/2017       Dear Dr. Baker:    Thank you for referring Billy Rivera to me for evaluation. Attached you will find relevant portions of my assessment and plan of care.    If you have questions, please do not hesitate to call me. I look forward to following Billy Rivera along with you.    Sincerely,      Simon Gomez MD            CC  No Recipients    Enclosure

## 2017-05-08 NOTE — PROGRESS NOTES
CRS Post-operative visit    Visit Info:     Procedure: Laparoscopic-assisted sigmoid colectomy.    Date of Procedure: April 5, 2017    Indication: 50 yo M, underwent asymptomatic screening colonoscopy 2/17/17:     -Diverticulosis in the sigmoid colon and in the descending colon.  -5 mm polyp in the cecum, removed with a cold snare - hyperplastic  -Granularity at the appendiceal orifice. Biopsied - benign mucosa with lymphoid aggregates  -4 mm polyp in the ascending colon, removed with a hot snare - hyperplastic  -6 mm polyp at the hepatic flexure, removed with a hot snare - hyperplastic  -10 mm pedunculated polyp with a very broad based stalk in the sigmoid colon, removed with a hot snare - INVASIVE ADENOCARCINOMA ARISING FROM AN ADENOMATOUS POLYP WITH HIGH-GRADE DYSPLASIA. (Note: Tumor focally extends through the muscularis mucosa)  -Two 2 to 3 mm polyps at the recto-sigmoid colon, removed with a jumbo cold forceps - hyperplastic/     Further comments regarding the malignant polyp from pathologist: The tumor appears of intermediate differentiation and is focally present at the edges of the biopsies. Although a small margin is demonstrated in one well oriented section, adequate margins are not demonstrated in the other sections submitted. A polyp stalk is not identified within the specimen. Lymphovascular invasion is not identified.    CT A/P showed no evidence of metastatic disease.    His case was reviewed at CRS multi-disciplinary conference and consensus was to proceed with segmental colectomy.    Date of Discharge: April 8, 2017    Current Status: At 1st POV 4/24/17 he was doing well aside from clear drainage from his Pfannensteil incision - wound was opened slightly and seroma drained.  He returned 5/1/17 with increasing swelling and drainage from the incision - Incision & Drainage performed, give Cipro-Flagyl x 1 week.  He returns today for follow-up - still draining a fair amount of fluid.  Not packing the  wound - just covering it with a band-aid.  No F/C.  Eating with no N/V.   BM's normal.  Minimal pain.    Pathology:   NO RESIDUAL INFILTRATING CARCINOMA IN THE ENTIRELY SUBMITTED INDICATED REGION  FOCUS OF RESIDUAL ADENOMATOUS TRANSFORMATION  19 LYMPH NODE WITH NO METASTATIC CARCINOMA IDENTIFIED  DIVERTICULOSIS WITH FOCI OF ACUTE AND CHRONIC DIVERTICULITIS    Physical Exam:  General: White male in NAD   Neuro: aaox4   Respiratory: resps even unlabored  Abdomen: Soft, NT/ND, lap trocar incisions well healed,  Pfannenstiel incision with 3-4 cm opening at left lateral aspect - probed and led to large SQ pocket under length of incision - moderate volume of seropurulent fluid drained, no surrounding induration/erythema/crepitus/fluctuance  Extremities: Warm dry and intact    Assessment:  Malignant colon polyp removed endoscopically, no residual cancer s/p lap sigmoid colectomy  Superficial wound infection    Plan:  Patient instructed to be sure he is packing the wound with gauze, not just covering it over. Rec bid dressing changes until I see him back next week.  Offered to set up home nursing for wound care, but he declined.   Diet & activity as tolerated  No need for further abx  RTO 1 week for wound check    Simon Gomez MD, FACS, FASCRS

## 2017-05-11 ENCOUNTER — TELEPHONE (OUTPATIENT)
Dept: VASCULAR SURGERY | Facility: CLINIC | Age: 52
End: 2017-05-11

## 2017-05-11 NOTE — TELEPHONE ENCOUNTER
Left message for the pt to call the clinic, appt was scheduled by another dept. I look in the pt chart and I believed the pt was referred to vascular medicine not vascular surgery . In a detail message I left Dr. Samuel nurse ( Yaima DIAS [phone])

## 2017-05-15 DIAGNOSIS — I73.9 PVD (PERIPHERAL VASCULAR DISEASE): Primary | ICD-10-CM

## 2017-05-16 ENCOUNTER — OFFICE VISIT (OUTPATIENT)
Dept: SURGERY | Facility: CLINIC | Age: 52
End: 2017-05-16
Payer: COMMERCIAL

## 2017-05-16 VITALS
HEART RATE: 67 BPM | WEIGHT: 271.19 LBS | BODY MASS INDEX: 34.8 KG/M2 | DIASTOLIC BLOOD PRESSURE: 76 MMHG | SYSTOLIC BLOOD PRESSURE: 127 MMHG | HEIGHT: 74 IN

## 2017-05-16 DIAGNOSIS — C18.7 MALIGNANT NEOPLASM OF SIGMOID COLON: Primary | ICD-10-CM

## 2017-05-16 PROCEDURE — 99024 POSTOP FOLLOW-UP VISIT: CPT | Mod: S$GLB,,, | Performed by: COLON & RECTAL SURGERY

## 2017-05-16 PROCEDURE — 99999 PR PBB SHADOW E&M-EST. PATIENT-LVL III: CPT | Mod: PBBFAC,,, | Performed by: COLON & RECTAL SURGERY

## 2017-05-16 RX ORDER — CARVEDILOL 12.5 MG/1
TABLET ORAL
Qty: 60 TABLET | Refills: 3 | Status: SHIPPED | OUTPATIENT
Start: 2017-05-16 | End: 2018-02-22 | Stop reason: SDUPTHER

## 2017-05-16 RX ORDER — CIPROFLOXACIN 500 MG/1
500 TABLET ORAL 2 TIMES DAILY
Qty: 14 TABLET | Refills: 0 | Status: SHIPPED | OUTPATIENT
Start: 2017-05-16 | End: 2017-05-23

## 2017-05-16 RX ORDER — METRONIDAZOLE 500 MG/1
500 TABLET ORAL EVERY 8 HOURS
Qty: 21 TABLET | Refills: 0 | Status: SHIPPED | OUTPATIENT
Start: 2017-05-16 | End: 2017-05-23

## 2017-05-16 NOTE — PROGRESS NOTES
CRS Post-operative visit    Visit Info:     Procedure: Laparoscopic-assisted sigmoid colectomy.    Date of Procedure: April 5, 2017    Indication: 52 yo M, underwent asymptomatic screening colonoscopy 2/17/17:     -Diverticulosis in the sigmoid colon and in the descending colon.  -5 mm polyp in the cecum, removed with a cold snare - hyperplastic  -Granularity at the appendiceal orifice. Biopsied - benign mucosa with lymphoid aggregates  -4 mm polyp in the ascending colon, removed with a hot snare - hyperplastic  -6 mm polyp at the hepatic flexure, removed with a hot snare - hyperplastic  -10 mm pedunculated polyp with a very broad based stalk in the sigmoid colon, removed with a hot snare - INVASIVE ADENOCARCINOMA ARISING FROM AN ADENOMATOUS POLYP WITH HIGH-GRADE DYSPLASIA. (Note: Tumor focally extends through the muscularis mucosa)  -Two 2 to 3 mm polyps at the recto-sigmoid colon, removed with a jumbo cold forceps - hyperplastic/     Further comments regarding the malignant polyp from pathologist: The tumor appears of intermediate differentiation and is focally present at the edges of the biopsies. Although a small margin is demonstrated in one well oriented section, adequate margins are not demonstrated in the other sections submitted. A polyp stalk is not identified within the specimen. Lymphovascular invasion is not identified.    CT A/P showed no evidence of metastatic disease.    His case was reviewed at CRS multi-disciplinary conference and consensus was to proceed with segmental colectomy.    Date of Discharge: April 8, 2017    Current Status: At 1st POV 4/24/17 he was doing well aside from clear drainage from his Pfannensteil incision - wound was opened slightly and seroma drained.  He returned 5/1/17 with increasing swelling and drainage from the incision - Incision & Drainage performed, give Cipro-Flagyl x 1 week.  He returned 5/8/17 for follow-up - still draining a fair amount of fluid.  Not packing the  wound - just covering it with a band-aid - I re-drained the wound, gave him instructions on wound packing.  He returns today with increased swelling/drainage - was not able to pack the wound - I probed the wound with drainage of a large volume of frankly purulent fluid.  No F/C.  Eating with no N/V.   BM's normal.  Minimal pain.    Pathology:   NO RESIDUAL INFILTRATING CARCINOMA IN THE ENTIRELY SUBMITTED INDICATED REGION  FOCUS OF RESIDUAL ADENOMATOUS TRANSFORMATION  19 LYMPH NODE WITH NO METASTATIC CARCINOMA IDENTIFIED  DIVERTICULOSIS WITH FOCI OF ACUTE AND CHRONIC DIVERTICULITIS    Physical Exam:  General: White male in NAD   Neuro: aaox4   Respiratory: resps even unlabored  Abdomen: Soft, NT/ND, lap trocar incisions well healed,  Pfannenstiel incision with 3-4 cm opening at left lateral aspect - probed and led to large SQ pocket under until length of incision - large volume of frankly purulent fluid drained, +moderate surrounding induration/erythema but no crepitus/fluctuance  Extremities: Warm dry and intact    Assessment:  Malignant colon polyp removed endoscopically, no residual cancer s/p lap sigmoid colectomy  Post-op wound infection wound infection    Plan:  Under sterile condition, left half of incision infiltrated with 1% Lido with epi and incision was opened - loculations within the subcutaneous pocket were bluntly disrupted, resulting in drainage of a large amount of purulent fluid -  Cavity was drained, irrigated, and packed with Kerlex guaze.     Will make arrangements for home nursing for wound care (daily dressing changes).  Re-start abx (Cipro/Flagyl x 1 week).   Diet & activity as tolerated  RTO 1 week for wound check    Simon Gomez MD, FACS, FASCRS

## 2017-05-17 ENCOUNTER — TELEPHONE (OUTPATIENT)
Dept: SURGERY | Facility: HOSPITAL | Age: 52
End: 2017-05-17

## 2017-05-17 NOTE — TELEPHONE ENCOUNTER
----- Message from Brenda Sousa LPN sent at 5/17/2017 11:26 AM CDT -----  Contact: Tessa castellanos/ Home Health Nurse #[phone]      ----- Message -----     From: Aurea Orellana     Sent: 5/17/2017  11:02 AM       To: Jason Montes Staff    Tessa castellanos/Home Health Nurse states she was calling to speak to the nurse about the Pt None Admitt because of working outside of the home. Pt is unable to get Home Health

## 2017-05-17 NOTE — TELEPHONE ENCOUNTER
S/w Tessa and pt, he is working as a  and he feels that he has to work to keep a paycheck coming in, advised to not lift anything and he says he does not, told pt it would be best to not be working with the abd wound but he said he has to work, says he feels comfortable packing the wound after home health showed him how to do it, made an appt with to come in tomorrow and will check on progress, could see him in clinic and once Mery returns he could fu with her on a weekly basis

## 2017-05-18 ENCOUNTER — OFFICE VISIT (OUTPATIENT)
Dept: SURGERY | Facility: CLINIC | Age: 52
End: 2017-05-18
Payer: COMMERCIAL

## 2017-05-18 VITALS
HEART RATE: 87 BPM | SYSTOLIC BLOOD PRESSURE: 154 MMHG | DIASTOLIC BLOOD PRESSURE: 93 MMHG | TEMPERATURE: 98 F | BODY MASS INDEX: 34.32 KG/M2 | HEIGHT: 74 IN | WEIGHT: 267.44 LBS

## 2017-05-18 PROCEDURE — 99999 PR PBB SHADOW E&M-EST. PATIENT-LVL IV: CPT | Mod: PBBFAC,,, | Performed by: NURSE PRACTITIONER

## 2017-05-18 PROCEDURE — 99024 POSTOP FOLLOW-UP VISIT: CPT | Mod: S$GLB,,, | Performed by: NURSE PRACTITIONER

## 2017-05-19 NOTE — PROGRESS NOTES
Patient ID:  Billy Rivera is a 51 y.o. male     Chief Complaint:   Chief Complaint   Patient presents with    Follow-up        HPI:  Pt seen for wound care fu, had lap assist sigmoidectomy 4/15/17 for a cancerous polyp, final path no cancer found, was doing well until 5/16/17 when he returned to clinic with redness around his inc line.  At that time Jason Rich opened the pfannenstiel inc line and purulent drainage obtained, on antibiotics and wound was packed.  Home health was set up but when home health went to house he told them he had gone back to work so he is not eligible for home health.  Here with iwfe to ensure he is packing would properly    ROS:        Constitutional: No fever, chills, activity or appetite change.      HENT: No hearing loss, facial swelling, neck pain or stiffness.       Eyes: No discharge, itching and visual disturbance.      Respiratory: No apnea, cough, choking or shortness of breath.       Cardiovascular: No leg swelling or chest pain      Gastrointestinal: No abdominal distention or change in bowel habbits     Genitourinary: No dysuria, frequency or flank pain.      Musculoskeletal: No arthralgias or gait problem.      Neurological: No dizziness, seizures or weakness.      Hematological: No adenopathy.      Psychiatric/Behavioral: No hallucinations or behavioral problems.       PE:    APPEARANCE: Well nourished, well developed, in no acute distress.   CHEST: Lungs clear. Normal respiratory effort.  CARDIOVASCULAR: Normal rhythm. No edema.  ABDOMEN: Soft. No tenderness or masses.  Pfannenstiel wound opened approx 4-5cm with no surrounding erythema or induration, wound packing placed with damp kerlix and outer dsg applied  Rectum:  Normal skin, NST, no masses or tenderness    Musculoskeletal: Symmetric, normal range of motion and strength.   Neurological: Alert and oriented to person, place, and time. Normal reflexes.   Skin: Skin is warm and dry.   Psychiatric: Normal mood and  affect. Behavior is normal and appropriate.     Impression:    Post op wound infection    PLAN:  Pt and wife shown how to pack wound and both were able to return demonstration (wife very hesitant)  Fu with Dr. osbaldo Mari and if wound care continues to be problem he can come to my clinc or Mery's, offered that as suggetion but pt thought he could do it with wife's help

## 2017-05-23 ENCOUNTER — OFFICE VISIT (OUTPATIENT)
Dept: SURGERY | Facility: CLINIC | Age: 52
End: 2017-05-23
Payer: COMMERCIAL

## 2017-05-23 VITALS
SYSTOLIC BLOOD PRESSURE: 113 MMHG | WEIGHT: 264.56 LBS | HEIGHT: 74 IN | HEART RATE: 62 BPM | BODY MASS INDEX: 33.95 KG/M2 | DIASTOLIC BLOOD PRESSURE: 77 MMHG

## 2017-05-23 DIAGNOSIS — C18.7 MALIGNANT NEOPLASM OF SIGMOID COLON: Primary | ICD-10-CM

## 2017-05-23 PROCEDURE — 99999 PR PBB SHADOW E&M-EST. PATIENT-LVL III: CPT | Mod: PBBFAC,,, | Performed by: COLON & RECTAL SURGERY

## 2017-05-23 PROCEDURE — 99024 POSTOP FOLLOW-UP VISIT: CPT | Mod: S$GLB,,, | Performed by: COLON & RECTAL SURGERY

## 2017-05-23 NOTE — PROGRESS NOTES
CRS Post-operative visit    Visit Info:     Procedure: Laparoscopic-assisted sigmoid colectomy.    Date of Procedure: April 5, 2017    Indication: 50 yo M, underwent asymptomatic screening colonoscopy 2/17/17:     -Diverticulosis in the sigmoid colon and in the descending colon.  -5 mm polyp in the cecum, removed with a cold snare - hyperplastic  -Granularity at the appendiceal orifice. Biopsied - benign mucosa with lymphoid aggregates  -4 mm polyp in the ascending colon, removed with a hot snare - hyperplastic  -6 mm polyp at the hepatic flexure, removed with a hot snare - hyperplastic  -10 mm pedunculated polyp with a very broad based stalk in the sigmoid colon, removed with a hot snare - INVASIVE ADENOCARCINOMA ARISING FROM AN ADENOMATOUS POLYP WITH HIGH-GRADE DYSPLASIA. (Note: Tumor focally extends through the muscularis mucosa)  -Two 2 to 3 mm polyps at the recto-sigmoid colon, removed with a jumbo cold forceps - hyperplastic/     Further comments regarding the malignant polyp from pathologist: The tumor appears of intermediate differentiation and is focally present at the edges of the biopsies. Although a small margin is demonstrated in one well oriented section, adequate margins are not demonstrated in the other sections submitted. A polyp stalk is not identified within the specimen. Lymphovascular invasion is not identified.    CT A/P showed no evidence of metastatic disease.    His case was reviewed at CRS multi-disciplinary conference and consensus was to proceed with segmental colectomy.    Date of Discharge: April 8, 2017    Current Status: Main problem post-op has been wound infection at Pfannensteil incision - last week I opened and drained it.  He was unable to get home health care b/c he has resumed work.  He is packing the wound himself daily.  No F/C.  Eating with no N/V.   BM's normal.  Minimal pain.    Pathology:   NO RESIDUAL INFILTRATING CARCINOMA IN THE ENTIRELY SUBMITTED INDICATED  REGION  FOCUS OF RESIDUAL ADENOMATOUS TRANSFORMATION  19 LYMPH NODE WITH NO METASTATIC CARCINOMA IDENTIFIED  DIVERTICULOSIS WITH FOCI OF ACUTE AND CHRONIC DIVERTICULITIS    Physical Exam:  General: White male in NAD   Neuro: aaox4   Respiratory: resps even unlabored  Abdomen: Soft, NT/ND, lap trocar incisions well healed,  Pfannenstiel incision with 5 cm opening at left lateral aspect that tracks deep and tunnels to the right - small amount of purulent fluid in cavity but wound itself looks good with healthy granulation tissue.  No surrounding induration/erythema/crepitus/fluctuance  Extremities: Warm dry and intact    Assessment:  Malignant colon polyp removed endoscopically, no residual cancer s/p lap sigmoid colectomy  Post-op wound infection wound infection    Plan:  Continue dressing changes at home - asked him to do it bid for the next week to try to dry it up some.   Diet & activity as tolerated  RTO 1 week for wound check    Simon Gomez MD, FACS, FASCRS

## 2017-05-26 ENCOUNTER — OFFICE VISIT (OUTPATIENT)
Dept: OPTOMETRY | Facility: CLINIC | Age: 52
End: 2017-05-26
Payer: COMMERCIAL

## 2017-05-26 DIAGNOSIS — H25.13 NUCLEAR SCLEROSIS, BILATERAL: ICD-10-CM

## 2017-05-26 DIAGNOSIS — E11.3393 MODERATE NONPROLIFERATIVE DIABETIC RETINOPATHY OF BOTH EYES WITHOUT MACULAR EDEMA ASSOCIATED WITH TYPE 2 DIABETES MELLITUS: Primary | ICD-10-CM

## 2017-05-26 PROCEDURE — 92004 COMPRE OPH EXAM NEW PT 1/>: CPT | Mod: S$GLB,,, | Performed by: OPTOMETRIST

## 2017-05-26 PROCEDURE — 99999 PR PBB SHADOW E&M-EST. PATIENT-LVL III: CPT | Mod: PBBFAC,,, | Performed by: OPTOMETRIST

## 2017-05-26 PROCEDURE — 92015 DETERMINE REFRACTIVE STATE: CPT | Mod: S$GLB,,, | Performed by: OPTOMETRIST

## 2017-05-26 NOTE — PROGRESS NOTES
HPI     DLS: 12/30/2013  Pt states intermediate distance has decreased with glasses. States when   reading with glasses va is blurry. Denies f/f    No gtts     Does not check BS   Hemoglobin A1C       Date                     Value               Ref Range             Status                03/24/2017               12.1 (H)            4.5 - 6.2 %           Final                  06/24/2016               11.7 (H)            4.5 - 6.2 %           Final                 06/10/2015               11.7 (H)            4.5 - 6.2 %           Final            ----------      Last edited by Jnena Delgado on 5/26/2017  8:49 AM. (History)        ROS     Positive for: Endocrine, Cardiovascular    Negative for: Constitutional, Gastrointestinal, Neurological, Skin,   Genitourinary, Musculoskeletal, HENT, Eyes, Respiratory, Psychiatric,   Allergic/Imm, Heme/Lymph    Last edited by Simon Gray, OD on 5/26/2017  8:57 AM. (History)        Assessment /Plan     For exam results, see Encounter Report.    Moderate nonproliferative diabetic retinopathy of both eyes without macular edema associated with type 2 diabetes mellitus    Nuclear sclerosis, bilateral      1. Cat OU--pt wishes to wait on surgery--wrote new spex Rx  2. DM w mod NPDR OU--discussed importance of keeping appts as sched to monitor!!    PLAN:    rtc 6 months to recheck DFE, or immediately if VA worsens

## 2017-05-30 ENCOUNTER — OFFICE VISIT (OUTPATIENT)
Dept: SURGERY | Facility: CLINIC | Age: 52
End: 2017-05-30
Payer: COMMERCIAL

## 2017-05-30 VITALS
HEIGHT: 74 IN | BODY MASS INDEX: 34.18 KG/M2 | WEIGHT: 266.31 LBS | HEART RATE: 73 BPM | DIASTOLIC BLOOD PRESSURE: 81 MMHG | SYSTOLIC BLOOD PRESSURE: 129 MMHG

## 2017-05-30 DIAGNOSIS — C18.7 MALIGNANT NEOPLASM OF SIGMOID COLON: Primary | ICD-10-CM

## 2017-05-30 PROCEDURE — 99999 PR PBB SHADOW E&M-EST. PATIENT-LVL III: CPT | Mod: PBBFAC,,, | Performed by: COLON & RECTAL SURGERY

## 2017-05-30 PROCEDURE — 99024 POSTOP FOLLOW-UP VISIT: CPT | Mod: S$GLB,,, | Performed by: COLON & RECTAL SURGERY

## 2017-05-30 NOTE — LETTER
June 1, 2017        DRE Baker MD  [address] [facility] [place]  [place] [address]             [facility]  [address]  [facility] [address]  Phone: [phone]   Patient: Billy Rivera   MR Number: [id number]   YOB: 1965   Date of Visit: 5/30/2017       Dear Dr. Baker:    Thank you for referring Billy Rivera to me for evaluation. Attached you will find relevant portions of my assessment and plan of care.    If you have questions, please do not hesitate to call me. I look forward to following Billy Rivera along with you.    Sincerely,      Simon Gomez MD            CC  No Recipients    Enclosure

## 2017-06-01 NOTE — PROGRESS NOTES
CRS Post-operative visit    Visit Info:     Procedure: Laparoscopic-assisted sigmoid colectomy.    Date of Procedure: April 5, 2017    Indication: 50 yo M, underwent asymptomatic screening colonoscopy 2/17/17:     -Diverticulosis in the sigmoid colon and in the descending colon.  -5 mm polyp in the cecum, removed with a cold snare - hyperplastic  -Granularity at the appendiceal orifice. Biopsied - benign mucosa with lymphoid aggregates  -4 mm polyp in the ascending colon, removed with a hot snare - hyperplastic  -6 mm polyp at the hepatic flexure, removed with a hot snare - hyperplastic  -10 mm pedunculated polyp with a very broad based stalk in the sigmoid colon, removed with a hot snare - INVASIVE ADENOCARCINOMA ARISING FROM AN ADENOMATOUS POLYP WITH HIGH-GRADE DYSPLASIA. (Note: Tumor focally extends through the muscularis mucosa)  -Two 2 to 3 mm polyps at the recto-sigmoid colon, removed with a jumbo cold forceps - hyperplastic/     Further comments regarding the malignant polyp from pathologist: The tumor appears of intermediate differentiation and is focally present at the edges of the biopsies. Although a small margin is demonstrated in one well oriented section, adequate margins are not demonstrated in the other sections submitted. A polyp stalk is not identified within the specimen. Lymphovascular invasion is not identified.    CT A/P showed no evidence of metastatic disease.    His case was reviewed at CRS multi-disciplinary conference and consensus was to proceed with segmental colectomy.    Date of Discharge: April 8, 2017    Current Status: Main problem post-op has been wound infection at Pfannensteil incision that I opened and drained 2 weeks ago.  He was unable to get home health care b/c he has resumed work and has been packing the wound himself daily - doing much better with wound care.  Minimal drainage, no pain.  No F/C.  Eating with no N/V.   BM's normal.      Pathology:   NO RESIDUAL INFILTRATING  CARCINOMA IN THE ENTIRELY SUBMITTED INDICATED REGION  FOCUS OF RESIDUAL ADENOMATOUS TRANSFORMATION  19 LYMPH NODE WITH NO METASTATIC CARCINOMA IDENTIFIED  DIVERTICULOSIS WITH FOCI OF ACUTE AND CHRONIC DIVERTICULITIS    Physical Exam:  General: White male in NAD   Neuro: aaox4   Respiratory: resps even unlabored  Abdomen: Soft, NT/ND, lap trocar incisions well healed,  Pfannenstiel incision with 5 cm opening at left lateral aspect that tracks deep and tunnels to the right - wound bed shows healthy granulation tissue, no significant drainage, no exudate.  No surrounding induration/erythema/crepitus/fluctuance  Extremities: Warm dry and intact    Assessment:  Malignant colon polyp removed endoscopically, no residual cancer s/p lap sigmoid colectomy  Post-op wound infection wound infection    Plan:  Continue daily dressing changes at home.   Diet & activity as tolerated  RTO 3 weeks for wound check    Simon Gomez MD, FACS, FASCRS

## 2017-06-19 ENCOUNTER — OFFICE VISIT (OUTPATIENT)
Dept: SURGERY | Facility: CLINIC | Age: 52
End: 2017-06-19
Payer: COMMERCIAL

## 2017-06-19 VITALS
SYSTOLIC BLOOD PRESSURE: 146 MMHG | DIASTOLIC BLOOD PRESSURE: 90 MMHG | HEART RATE: 60 BPM | BODY MASS INDEX: 34.23 KG/M2 | WEIGHT: 266.75 LBS | HEIGHT: 74 IN

## 2017-06-19 DIAGNOSIS — C18.7 MALIGNANT NEOPLASM OF SIGMOID COLON: Primary | ICD-10-CM

## 2017-06-19 PROCEDURE — 99024 POSTOP FOLLOW-UP VISIT: CPT | Mod: S$GLB,,, | Performed by: COLON & RECTAL SURGERY

## 2017-06-19 PROCEDURE — 99999 PR PBB SHADOW E&M-EST. PATIENT-LVL III: CPT | Mod: PBBFAC,,, | Performed by: COLON & RECTAL SURGERY

## 2017-06-19 NOTE — LETTER
June 19, 2017        DRE Baker MD  [address] [facility]  [place] [address]             [facility]  [address]  [facility] [address]  Phone: [phone]   Patient: Billy Rivera   MR Number: [id number]   YOB: 1965   Date of Visit: 6/19/2017       Dear Dr. Baker:    Thank you for referring Billy Rivera to me for evaluation. Attached you will find relevant portions of my assessment and plan of care.    If you have questions, please do not hesitate to call me. I look forward to following Billy Rivera along with you.    Sincerely,      Simon Gomez MD            CC  No Recipients    Enclosure

## 2017-06-19 NOTE — PROGRESS NOTES
CRS Post-operative visit    Visit Info:     Procedure: Laparoscopic-assisted sigmoid colectomy.    Date of Procedure: April 5, 2017    Indication: 52 yo M, underwent asymptomatic screening colonoscopy 2/17/17:     -Diverticulosis in the sigmoid colon and in the descending colon.  -5 mm polyp in the cecum, removed with a cold snare - hyperplastic  -Granularity at the appendiceal orifice. Biopsied - benign mucosa with lymphoid aggregates  -4 mm polyp in the ascending colon, removed with a hot snare - hyperplastic  -6 mm polyp at the hepatic flexure, removed with a hot snare - hyperplastic  -10 mm pedunculated polyp with a very broad based stalk in the sigmoid colon, removed with a hot snare - INVASIVE ADENOCARCINOMA ARISING FROM AN ADENOMATOUS POLYP WITH HIGH-GRADE DYSPLASIA. (Note: Tumor focally extends through the muscularis mucosa)  -Two 2 to 3 mm polyps at the recto-sigmoid colon, removed with a jumbo cold forceps - hyperplastic/     Further comments regarding the malignant polyp from pathologist: The tumor appears of intermediate differentiation and is focally present at the edges of the biopsies. Although a small margin is demonstrated in one well oriented section, adequate margins are not demonstrated in the other sections submitted. A polyp stalk is not identified within the specimen. Lymphovascular invasion is not identified.    CT A/P showed no evidence of metastatic disease.    His case was reviewed at CRS multi-disciplinary conference and consensus was to proceed with segmental colectomy.    Date of Discharge: April 8, 2017    Current Status: Wound healing well, denies erythema, induration or purulence. Energy and apetite are good. He has resumed work and has been packing the wound himself twice daily.  Moderate drainage, no pain.  No F/C.   BM's normal.      Pathology:   NO RESIDUAL INFILTRATING CARCINOMA IN THE ENTIRELY SUBMITTED INDICATED REGION  FOCUS OF RESIDUAL ADENOMATOUS TRANSFORMATION  19 LYMPH  NODE WITH NO METASTATIC CARCINOMA IDENTIFIED  DIVERTICULOSIS WITH FOCI OF ACUTE AND CHRONIC DIVERTICULITIS    Physical Exam:  General: White male in NAD   Neuro: aaox4   Respiratory: resps even unlabored  Abdomen: Soft, NT/ND, lap trocar incisions well healed,  Pfannenstiel incision with 3 cm opening at left lateral aspect that tracks deep and tunnels to the right - wound bed shows healthy granulation tissue, moderate drainage, some fibrinous exudate.  No surrounding induration/erythema/crepitus/fluctuance  Extremities: Warm dry and intact    Assessment:  Malignant colon polyp removed endoscopically, no residual cancer s/p lap sigmoid colectomy  Post-op wound infection wound infection    Plan:  Continue BID dressing changes at home with instruction to pack wound less to allow for closure of the cavity  Diet & activity as tolerated  RTC 3 weeks for wound check    I have interviewed and examined the patient, reviewed the notes and assessments, and/or personally supervised the procedure(s) performed by Dr. Baker, and I concur with her/his documentation of Billy Rivera.  See below addendum for my evaluation and additional findings.    Wound healing well - drainage much more clear.  Continue wound packing qd to bid as needed.  RTO 4 weeks for wound check.    Simon Gomez MD, FACS, FASCRS

## 2017-06-29 ENCOUNTER — OFFICE VISIT (OUTPATIENT)
Dept: INTERNAL MEDICINE | Facility: CLINIC | Age: 52
End: 2017-06-29
Payer: COMMERCIAL

## 2017-06-29 ENCOUNTER — LAB VISIT (OUTPATIENT)
Dept: LAB | Facility: HOSPITAL | Age: 52
End: 2017-06-29
Attending: NURSE PRACTITIONER
Payer: COMMERCIAL

## 2017-06-29 VITALS
SYSTOLIC BLOOD PRESSURE: 112 MMHG | HEIGHT: 73 IN | OXYGEN SATURATION: 97 % | RESPIRATION RATE: 18 BRPM | WEIGHT: 269.81 LBS | DIASTOLIC BLOOD PRESSURE: 82 MMHG | BODY MASS INDEX: 35.76 KG/M2 | TEMPERATURE: 99 F | HEART RATE: 61 BPM

## 2017-06-29 DIAGNOSIS — B35.1 ONYCHOMYCOSIS OF MULTIPLE TOENAILS WITH TYPE 2 DIABETES MELLITUS: ICD-10-CM

## 2017-06-29 DIAGNOSIS — E11.69 ONYCHOMYCOSIS OF MULTIPLE TOENAILS WITH TYPE 2 DIABETES MELLITUS: ICD-10-CM

## 2017-06-29 DIAGNOSIS — Z79.4 TYPE 2 DIABETES MELLITUS WITH HYPERGLYCEMIA, WITH LONG-TERM CURRENT USE OF INSULIN: ICD-10-CM

## 2017-06-29 DIAGNOSIS — E11.51 TYPE 2 DIABETES, WITH PERIPHERAL CIRCULATORY DISORDER NOT AT GOAL: ICD-10-CM

## 2017-06-29 DIAGNOSIS — E11.65 TYPE 2 DIABETES MELLITUS WITH HYPERGLYCEMIA, WITH LONG-TERM CURRENT USE OF INSULIN: ICD-10-CM

## 2017-06-29 DIAGNOSIS — Z12.5 SCREENING FOR PROSTATE CANCER: ICD-10-CM

## 2017-06-29 DIAGNOSIS — I15.2 HYPERTENSION ASSOCIATED WITH DIABETES: Primary | ICD-10-CM

## 2017-06-29 DIAGNOSIS — E11.59 HYPERTENSION ASSOCIATED WITH DIABETES: Primary | ICD-10-CM

## 2017-06-29 LAB
ALBUMIN SERPL BCP-MCNC: 3.1 G/DL
ALP SERPL-CCNC: 107 U/L
ALT SERPL W/O P-5'-P-CCNC: 20 U/L
ANION GAP SERPL CALC-SCNC: 8 MMOL/L
AST SERPL-CCNC: 16 U/L
BILIRUB SERPL-MCNC: 0.6 MG/DL
BUN SERPL-MCNC: 17 MG/DL
CALCIUM SERPL-MCNC: 9.1 MG/DL
CHLORIDE SERPL-SCNC: 105 MMOL/L
CHOLEST/HDLC SERPL: 3.5 {RATIO}
CO2 SERPL-SCNC: 26 MMOL/L
CREAT SERPL-MCNC: 0.9 MG/DL
EST. GFR  (AFRICAN AMERICAN): >60 ML/MIN/1.73 M^2
EST. GFR  (NON AFRICAN AMERICAN): >60 ML/MIN/1.73 M^2
ESTIMATED AVG GLUCOSE: 295 MG/DL
GLUCOSE SERPL-MCNC: 186 MG/DL
HBA1C MFR BLD HPLC: 11.9 %
HDL/CHOLESTEROL RATIO: 28.6 %
HDLC SERPL-MCNC: 126 MG/DL
HDLC SERPL-MCNC: 36 MG/DL
LDLC SERPL CALC-MCNC: 67 MG/DL
NONHDLC SERPL-MCNC: 90 MG/DL
POTASSIUM SERPL-SCNC: 4.2 MMOL/L
PROT SERPL-MCNC: 6.8 G/DL
SODIUM SERPL-SCNC: 139 MMOL/L
TRIGL SERPL-MCNC: 115 MG/DL
TSH SERPL DL<=0.005 MIU/L-ACNC: 1.36 UIU/ML

## 2017-06-29 PROCEDURE — 4010F ACE/ARB THERAPY RXD/TAKEN: CPT | Mod: S$GLB,,, | Performed by: INTERNAL MEDICINE

## 2017-06-29 PROCEDURE — 99999 PR PBB SHADOW E&M-EST. PATIENT-LVL III: CPT | Mod: PBBFAC,,, | Performed by: INTERNAL MEDICINE

## 2017-06-29 PROCEDURE — 90471 IMMUNIZATION ADMIN: CPT | Mod: S$GLB,,, | Performed by: INTERNAL MEDICINE

## 2017-06-29 PROCEDURE — 99214 OFFICE O/P EST MOD 30 MIN: CPT | Mod: 25,S$GLB,, | Performed by: INTERNAL MEDICINE

## 2017-06-29 PROCEDURE — 3046F HEMOGLOBIN A1C LEVEL >9.0%: CPT | Mod: S$GLB,,, | Performed by: INTERNAL MEDICINE

## 2017-06-29 PROCEDURE — 90732 PPSV23 VACC 2 YRS+ SUBQ/IM: CPT | Mod: S$GLB,,, | Performed by: INTERNAL MEDICINE

## 2017-06-29 NOTE — MEDICAL/APP STUDENT
Subjective:       Patient ID: Billy Rivera is a 52 y.o. male.    Chief Complaint: Follow-up (3 month follow up)    HPI   52 year old M with DMII, HTN, HLD presents to clinic today for 3 month follow up.  DMII: At last appointment in March 2017 he was noted to have increase in HbA1c to 12.1. He was subsequently referred to Endocrinology, which started him on Novolog 20u with meals, Lantus 52u BID, and Metformin 1000. Repeat HbA1c pending this morning. Reports compliance with medications. Denies hypoglycemic symptoms including lightheadedness, dizziness, fatigue. Denies changes to sensation, skin changes, poor healing.  HTN: At last visit, BP noted to be 130/90. He is on Lisinopril 20mg daily and Coreg 12.5mg daily. Given planned hemicolectomy 1 week after that visit, it was decided to maintain BP medication/doses at that time. Today he reports not taking BP at home since last visit and says he does not have a BP monitor. He reports BP readings averaging 110-120 systolic from various appointments over the last few months. He denies HA's, dizziness, vision changes. /78 in clinic today.  HLD: He is on high-dose statin therapy 80mg lipitor daily. His last lipid panel from June 2016 showed LDL 80.4 and cholesterol 148. Lipid panel drawn today still pending.    Review of Systems   Constitutional: Negative for activity change, chills, fatigue and fever.   HENT: Negative for congestion, ear pain, postnasal drip, rhinorrhea, sore throat and voice change.    Eyes: Negative for pain and visual disturbance.   Respiratory: Negative for cough, shortness of breath and wheezing.    Cardiovascular: Negative for chest pain, palpitations and leg swelling.   Gastrointestinal: Negative for abdominal distention, abdominal pain, constipation, diarrhea, nausea and vomiting.   Endocrine: Negative.    Genitourinary: Negative for difficulty urinating, dysuria, flank pain, frequency and hematuria.   Musculoskeletal: Negative for  arthralgias, back pain and myalgias.   Skin: Negative for color change, pallor, rash and wound.   Neurological: Negative for dizziness, weakness, light-headedness, numbness and headaches.   Hematological: Negative for adenopathy. Does not bruise/bleed easily.   Psychiatric/Behavioral: Negative for behavioral problems.     (+) Toe nail fungus BL  (-) muscle cramping    Objective:      Physical Exam   Constitutional: He is oriented to person, place, and time. He appears well-developed and well-nourished. No distress.   HENT:   Mouth/Throat: Oropharynx is clear and moist and mucous membranes are normal. No oropharyngeal exudate.   Eyes: Conjunctivae and EOM are normal. Pupils are equal, round, and reactive to light. No scleral icterus.   Neck: No JVD present. Carotid bruit is not present. No thyromegaly present.   Cardiovascular: Normal rate, regular rhythm and normal heart sounds.    No murmur heard.  Pulses:       Radial pulses are 2+ on the right side, and 2+ on the left side.        Dorsalis pedis pulses are 1+ on the right side, and 1+ on the left side.        Posterior tibial pulses are 1+ on the right side, and 1+ on the left side.   Pulmonary/Chest: Breath sounds normal. No respiratory distress. He has no wheezes. He has no rhonchi. He has no rales.   Abdominal: Soft. Normal appearance and bowel sounds are normal. There is no tenderness.   Musculoskeletal:        Right foot: There is swelling and decreased capillary refill. There is no tenderness.        Left foot: There is swelling and decreased capillary refill. There is no tenderness.   Feet:   Right Foot:   Skin Integrity: Negative for ulcer, blister, skin breakdown or erythema.   Left Foot:   Skin Integrity: Negative for ulcer, blister, skin breakdown or erythema.   Lymphadenopathy:     He has no cervical adenopathy.   Neurological: He is alert and oriented to person, place, and time. No sensory deficit.   Skin: No rash noted. He is not diaphoretic.    Psychiatric: He has a normal mood and affect. His behavior is normal.         Assessment:       1. Hypertension associated with diabetes        Plan:     52 year old M with DMII, HTN, HLD here for follow up  DMII:  - Followed by Endocrine with next appointment in 1 week. HbA1c from today will be available to them at that time.  - Currently prescribed Novolog 20u with meals, Lantus 52u bid, and Metformin 1000mg daily.  - Will defer to Endocrine for diabetes management.  HTN:  - BP in clinic today 108/78. He does not use a home BP monitor. Have encouraged him to take his BP daily and record these readings. At the moment, his BP seems well controlled with Lisinopril 20mg and Coreg 12.5mg. Will keep these medications/doses and re-evaluate at next visit.  HLD:  - Patient is currently at max dose of Atorvastatin. Lipid panel from today still pending. Last lipid panel from 1 year ago was acceptable given his profile and comorbidities. Will maintain this medication for now. Re-evaluate in 6 months.  OTHER:  - Patient is due for PSA screening. Will order to be done here today following visit.

## 2017-06-30 ENCOUNTER — TELEPHONE (OUTPATIENT)
Dept: INTERNAL MEDICINE | Facility: CLINIC | Age: 52
End: 2017-06-30

## 2017-06-30 NOTE — TELEPHONE ENCOUNTER
----- Message from Lakesha Hermosillo sent at 6/30/2017  2:36 PM CDT -----  Contact: [facility] [phone]  RX request - refill or new RX.  Is this a refill or new RX:  REFILL  RX name and strength: insulin aspart (NOVOLOG FLEXPEN) 100 unit/mL InPn pen  Directions:   Is this a 30 day or 90 day RX:    Pharmacy name and phone #: [phone]  Comments:  PT INSURANCE WILL NOT COVER THIS SCRIPT,THEY NEED ANOTHER MEDICATION THAT WILL GET COVERED

## 2017-07-03 PROBLEM — E11.69 ONYCHOMYCOSIS OF MULTIPLE TOENAILS WITH TYPE 2 DIABETES MELLITUS: Status: ACTIVE | Noted: 2017-07-03

## 2017-07-03 PROBLEM — B35.1 ONYCHOMYCOSIS OF MULTIPLE TOENAILS WITH TYPE 2 DIABETES MELLITUS: Status: ACTIVE | Noted: 2017-07-03

## 2017-07-04 NOTE — PROGRESS NOTES
History of present illness:  52-year-old male with hypertension and diabetes mellitus following up on those issues.  We saw him several months ago and his blood pressure was borderline.  He returns reporting blood pressure readings at home are reasonable.  Denies chest pain palpitations syncope cough shortness of breath.  Mild distal lower extremity edema.    Current medications: All medications noted and reviewed    Review of systems:  Constitutional: No fever chills or unexpected weight changes  Cardiovascular: No chest pain palpitations syncope denies claudication.  Mild distal lower extremity edema is noted.  Respiratory: No cough or shortness of breath.  : No dysuria Fricks to change character.    Past medical history past surgical history family medical history and social history all noted reviewed the electronic medical record history sections    Physical examination:  General: Alert pleasant appropriately groomed male acute distress.  Vital signs: Blood pressure 112/82.  HEENT: Normocephalic neck supple no masses no thyromegaly.  Cardiovascular: Regular rate and rhythm.  No significant murmur.  Carotids full bilaterally bruits.  Pedal pulses are not palpable.  Capillary filling is moderately delayed.  Trace distal lower extremity edema.  Skin: Onychomycosis of all toenails  Diabetic foot exam:    Visual Inspection:  Normal -  Bilateral    Pedal Pulses:   Right: absent  Left: absent    Posterior tibialis:   Right:absent.  Left: absent      Data:  There is laboratory data pending today ordered by endocrinology including chemistry profile lipid profile glycohemoglobin.    Impression:  Hypertension reasonably controlled.  Diabetes mellitus is being followed managed by endocrinology.  Probable PAD.   CAD clinically stable.   dyslipidemia.     Plan:  ABIs with stress.  Update PSA.  Pneumococcal 23 Valent vaccine.  Return to clinic 6 months.

## 2017-07-05 ENCOUNTER — TELEPHONE (OUTPATIENT)
Dept: INTERNAL MEDICINE | Facility: CLINIC | Age: 52
End: 2017-07-05

## 2017-07-05 ENCOUNTER — OFFICE VISIT (OUTPATIENT)
Dept: ENDOCRINOLOGY | Facility: CLINIC | Age: 52
End: 2017-07-05
Payer: COMMERCIAL

## 2017-07-05 VITALS
DIASTOLIC BLOOD PRESSURE: 82 MMHG | WEIGHT: 272.25 LBS | HEIGHT: 73 IN | HEART RATE: 73 BPM | SYSTOLIC BLOOD PRESSURE: 122 MMHG | BODY MASS INDEX: 36.08 KG/M2

## 2017-07-05 DIAGNOSIS — E11.51 TYPE 2 DIABETES MELLITUS WITH DIABETIC PERIPHERAL ANGIOPATHY WITHOUT GANGRENE, WITH LONG-TERM CURRENT USE OF INSULIN: Primary | ICD-10-CM

## 2017-07-05 DIAGNOSIS — E11.59 HYPERTENSION ASSOCIATED WITH DIABETES: ICD-10-CM

## 2017-07-05 DIAGNOSIS — E11.3393 TYPE 2 DIABETES MELLITUS WITH BOTH EYES AFFECTED BY MODERATE NONPROLIFERATIVE RETINOPATHY WITHOUT MACULAR EDEMA, WITH LONG-TERM CURRENT USE OF INSULIN: ICD-10-CM

## 2017-07-05 DIAGNOSIS — Z79.4 TYPE 2 DIABETES MELLITUS WITH BOTH EYES AFFECTED BY MODERATE NONPROLIFERATIVE RETINOPATHY WITHOUT MACULAR EDEMA, WITH LONG-TERM CURRENT USE OF INSULIN: ICD-10-CM

## 2017-07-05 DIAGNOSIS — I15.2 HYPERTENSION ASSOCIATED WITH DIABETES: ICD-10-CM

## 2017-07-05 DIAGNOSIS — Z79.4 TYPE 2 DIABETES MELLITUS WITH HYPERGLYCEMIA, WITH LONG-TERM CURRENT USE OF INSULIN: ICD-10-CM

## 2017-07-05 DIAGNOSIS — Z79.4 TYPE 2 DIABETES MELLITUS WITH DIABETIC PERIPHERAL ANGIOPATHY WITHOUT GANGRENE, WITH LONG-TERM CURRENT USE OF INSULIN: Primary | ICD-10-CM

## 2017-07-05 DIAGNOSIS — E78.5 DYSLIPIDEMIA: ICD-10-CM

## 2017-07-05 DIAGNOSIS — R80.9 MICROALBUMINURIA: ICD-10-CM

## 2017-07-05 DIAGNOSIS — E11.65 TYPE 2 DIABETES MELLITUS WITH HYPERGLYCEMIA, WITH LONG-TERM CURRENT USE OF INSULIN: ICD-10-CM

## 2017-07-05 DIAGNOSIS — I21.4 NSTEMI (NON-ST ELEVATED MYOCARDIAL INFARCTION): ICD-10-CM

## 2017-07-05 PROBLEM — E11.42 TYPE 2 DIABETES MELLITUS WITH DIABETIC POLYNEUROPATHY, WITH LONG-TERM CURRENT USE OF INSULIN: Status: RESOLVED | Noted: 2017-04-02 | Resolved: 2017-07-05

## 2017-07-05 PROCEDURE — 4010F ACE/ARB THERAPY RXD/TAKEN: CPT | Mod: S$GLB,,, | Performed by: NURSE PRACTITIONER

## 2017-07-05 PROCEDURE — 3046F HEMOGLOBIN A1C LEVEL >9.0%: CPT | Mod: S$GLB,,, | Performed by: NURSE PRACTITIONER

## 2017-07-05 PROCEDURE — 99214 OFFICE O/P EST MOD 30 MIN: CPT | Mod: S$GLB,,, | Performed by: NURSE PRACTITIONER

## 2017-07-05 PROCEDURE — 99999 PR PBB SHADOW E&M-EST. PATIENT-LVL III: CPT | Mod: PBBFAC,,, | Performed by: NURSE PRACTITIONER

## 2017-07-05 RX ORDER — INSULIN ASPART 100 [IU]/ML
INJECTION, SOLUTION INTRAVENOUS; SUBCUTANEOUS
Qty: 2 VIAL | Refills: 6 | Status: SHIPPED | OUTPATIENT
Start: 2017-07-05 | End: 2017-08-29 | Stop reason: CLARIF

## 2017-07-05 RX ORDER — INSULIN GLARGINE 100 [IU]/ML
INJECTION, SOLUTION SUBCUTANEOUS
Qty: 2 BOX | Refills: 3 | Status: ON HOLD
Start: 2017-07-05 | End: 2018-06-11

## 2017-07-05 NOTE — TELEPHONE ENCOUNTER
----- Message from Lilibeth Kraus sent at 7/3/2017  1:40 PM CDT -----  Contact: [facility]/wife/[phone]    RX request - refill or new RX.  Is this a refill or new RX:  New Rx  RX name and strength: Novolin N-70/30/R  Directions:   Is this a 30 day or 90 day RX:    Pharmacy name and phone #:   Comments:  Patient need a new rx, because the insulin that he was on, pharmacy will not longer cover that. [facility] [address] [place] [address] # [phone]. Please call and advise.       Thank you!!!

## 2017-07-05 NOTE — PATIENT INSTRUCTIONS
Understanding Carbohydrates, Fats, and Protein  Food is a source of fuel and nourishment for your body. Its also a source of pleasure. Having diabetes doesnt mean you have to eat special foods or give up desserts. Instead, your dietitian can show you how to plan meals to suit your body. To start, learn how different foods affect blood sugar.  Carbohydrates  Carbohydrates are the main source of fuel for the body. Carbohydrates raise blood sugar. Many people think carbohydrates are only found in pasta or bread. But carbohydrates are actually in many kinds of foods:  · Sugars occur naturally in foods such as fruit, milk, honey, and molasses. Sugars can also be added to many foods, from cereals and yogurt to candy and desserts. Sugars raise blood sugar.  · Starches are found in bread, cereals, pasta, and dried beans. Theyre also found in corn, peas, potatoes, yam, acorn squash, and butternut squash. Starches also raise blood sugar.   · Fiber is found in foods such as vegetables, fruits, beans, and whole grains. Unlike other carbs, fiber isnt digested or absorbed. So it doesnt raise blood sugar. In fact, fiber can help keep blood sugar from rising too fast. It also helps keep blood cholesterol at a healthy level.  Did you know?  Even though carbohydrates raise blood sugar, its best to have some in every meal. They are an important part of a healthy diet.   Fat  Fat is an energy source that can be stored until needed. Fat does not raise blood sugar. However, it can raise blood cholesterol, increasing the risk of heart disease. Fat is also high in calories, which can cause weight gain. Not all types of fat are the same.  More Healthy:  · Monounsaturated fats are mostly found in vegetable oils, such as olive, canola, and peanut oils. They are also found in avocados and some nuts. Monounsaturated fats are healthy for your heart. Thats because they lower LDL (unhealthy) cholesterol.  · Polyunsaturated fats are mostly  found in vegetable oils, such as corn, safflower, and soybean oils. They are also found in some seeds, nuts, and fish. Polyunsaturated fats lower LDL (unhealthy) cholesterol. So, choosing them instead of saturated fats is healthy for your heart. Certain unsaturated fats can help lower triglycerides.   Less Healthy:  · Saturated fats are found in animal products, such as meat, poultry, whole milk, lard, and butter. Saturated fats raise LDL cholesterol and are not healthy for your heart.  · Hydrogenated oils and trans fats are formed when vegetable oils are processed into solid fats. They are found in many processed foods. Hydrogenated oils and trans fats raise LDL cholesterol and lower HDL (healthy) cholesterol. They are not healthy for your heart.  Protein  Protein helps the body build and repair muscle and other tissue. Protein has little or no effect on blood sugar. However, many foods that contain protein also contain saturated fat. By choosing low-fat protein sources, you can get the benefits of protein without the extra fat:  · Plant protein is found in dry beans and peas, nuts, and soy products, such as tofu and soymilk. These sources tend to be cholesterol-free and low in saturated fat.  · Animal protein is found in fish, poultry, meat, cheese, milk, and eggs. These contain cholesterol and can be high in saturated fat. Aim for lean, lower-fat choices.  Date Last Reviewed: 3/1/2016  © 7671-6678 [employer]. [address] [place] [address]. All rights reserved. This information is not intended as a substitute for professional medical care. Always follow your healthcare professional's instructions.        Diet: Diabetes  Food is an important tool that you can use to control diabetes and stay healthy. Eating well-balanced meals in the correct amounts will help you control your blood glucose levels and prevent low blood sugar reactions. It will also help you reduce the health risks of  diabetes. There is no one specific diet that is right for everyone with diabetes. But there are general guidelines to follow. A registered dietitian (RD) will create a tailored diet approach thats just right for you. He or she will also help you plan healthy meals and snacks. If you have any questions, call your dietitian for advice.    Guidelines for success  Talk with your healthcare provider before starting a diabetes diet or weight loss program. If you haven't talked with a dietitian yet, ask your provider for a referral. The following guidelines can help you succeed:  · Select foods from the 6 food groups below. Your dietitian will help you find food choices within each group. He or she will also show you serving sizes and how many servings you can have at each meal.  ¨ Grains, beans, and starchy vegetables  ¨ Vegetables  ¨ Fruit  ¨ Milk or yogurt  ¨ Meat, poultry, fish, or tofu  ¨ Healthy fats  · Check your blood sugar levels as directed by your provider. Take any medicine as prescribed by your provider.  · Learn to read food labels and pick the right portion sizes.  · Eat only the amount of food in your meal plan. Eat about the same amount of food at regular times each day. Dont skip meals. Eat meals 4 to 5 hours apart, with snacks in between.  · Limit alcohol. It raises blood sugar levels. Drink water or calorie-free diet drinks that use safe sweeteners.  · Eat less fat to help lower your risk of heart disease. Use nonfat or low-fat dairy products and lean meats. Avoid fried foods. Use cooking oils that are unsaturated, such as olive, canola, or peanut oil.  · Talk with your dietitian about safe sugar substitutes.  · Avoid added salt. It can contribute to high blood pressure, which can cause heart disease. People with diabetes already have a risk of high blood pressure and heart disease.  · Stay at a healthy weight. If you need to lose weight, cut down on your portion sizes. But dont skip meals. Exercise  is an important part of any weight management program. Talk with your provider about an exercise program thats right for you.  · For more information about the best diet plan for you, talk with a registered dietitian (RD). To find an RD in your area, contact:  ¨ Academy of Nutrition and Dietetics www.eatright.org  ¨ The American Diabetes Association [phone] www.diabetes.org  Date Last Reviewed: 8/1/2016 © 2000-2016 [employer]. [address] [place] [address]. All rights reserved. This information is not intended as a substitute for professional medical care. Always follow your healthcare professional's instructions.        Eating Out When You Have Diabetes  Eating right is an important part of keeping your blood sugar in your target range. You just need to make healthy choices.    Tips for restaurant meals  When you eat away from home try these tips:  · Try to schedule your dining-out meal at your normal meal time. Make a reservation if possible, so you don't have to wait to eat. If you can't make a reservation, try to arrive at the restaurant at a less-busy time to cut down your wait time. Eat a small fruit or starch snack at your regular mealtime if your restaurant meal is going to be later than usual.   · Call ahead to see if the restaurant can meet your dietary needs if you've never been there before. Or you can go online to see the menu ahead of time.  · Carry some crackers with you in case the restaurant needs you to wait until you can be served.  · Ask how foods are prepared before you order.  · Instead of fried, sautéed, or breaded foods, choose ones that are broiled, steamed, grilled, or baked.  · Ask for sauces, gravies, and dressings on the side.  · Only eat an amount that fits your meal plan. Remember: You can take home the leftovers.  · Save dessert for special occasions. Then choose a small dessert or share one with a friend or family member.  Make healthy choices  Fast  food  · Garden salad with light dressing on the side  · Baked potato with vegetables or herbs  · Broiled, roasted, or grilled chicken sandwich  · Sliced turkey or lean roast beef sandwich  Mexican  · Chicken enchilada, without cheese or sour cream   · Small burrito with whole beans and chicken  · Whole beans (not refried) and rice  · Chicken or fish fajitas  Steakhouse  · Grilled or broiled lean cuts of beef  · Baked potato with vegetables or herbs  · Broiled or baked chicken. Dont eat the skin.  · Steamed vegetables  Asian  · Steamed dumplings or potstickers  · Broiled, boiled, or steamed meats or fish  · Sushi or sashimi  · Steamed rice or boiled noodles. One serving is equal to 1/3 cup.  Date Last Reviewed: 6/1/2016  © 1021-3686 [employer]. [address] [place] [address]. All rights reserved. This information is not intended as a substitute for professional medical care. Always follow your healthcare professional's instructions.      Snacks can be an important part of a balanced, healthy meal plan. They allow you to eat more frequently, feeling full and satisfied throughout the day. Also, they allow you to spread carbohydrates evenly, which may stabilize blood sugars.  Plus, snacks are enjoyable!     The amount of carbohydrate needed at snacks varies. Generally, about 15-30 grams of carbohydrate per snack is recommended.  Below you will find some tasty treats.       0-5 gm carb   Crystal Light   Vitamin Water Zero   Herbal tea, unsweetened   2 tsp peanut butter on celery   1./2 cup sugar-free jell-o   1 sugar-free popsicle   ¼ cup blueberries   8oz Blue Mari unsweetened almond milk   5 baby carrots & celery sticks, cucumbers, bell peppers dipped in ¼ cup salsa, 2Tbsp light ranch dressing or 2Tbsp plain Greek yogurt   10 Goldfish crackers   ½ oz low-fat cheese or string cheese   1 closed handful of nuts, unsalted   1 Tbsp of sunflower seeds, unsalted   1 cup Smart Pop  popcorn   1 whole grain brown rice cake        15 gm carb   1 small piece of fruit or ½ banana or 1/2 cup lite canned fruit   3 arik cracker squares   3 cups Smart Pop popcorn, top spray butter, Cook lite salt or cinnamon and Truvia   5 Vanilla Wafers   ½ cup low fat, no added sugar ice cream or frozen yogurt (Blue bell, Blue Bunny, Weight Watchers, Skinny Cow)   ½ turkey, ham, or chicken sandwich   ½ c fruit with ½ c Cottage cheese   4-6 unsalted wheat crackers with 1 oz low fat cheese or 1 tbsp peanut butter    30-45 goldfish crackers (depending on flavor)    7-8 Methodist mini brown rice cakes (caramel, apple cinnamon, chocolate)    12 [employer] mini brown rice cakes (cheddar, bbq, ranch)    1/3 cup hummus dip with raw veg   1/2 whole wheat aundrea, 1Tbsp hummus   Mini Pizza (1/2 whole wheat English muffin, low-fat  cheese, tomato sauce)   100 calorie snack pack (Oreo, Chips Ahoy, Ritz Mix, Baked Cheetos)   4-6 oz. light or Greek Style yogurt (Chobani, Yoplait, Okios, [place]oneyfield)   ½ cup sugar-free pudding     6 in. wheat tortilla or aundrea oven toasted chips (topped with spray butter flavoring, cinnamon, Truvia OR spray butter, garlic powder, chili powder)    18 BBQ Popchips (available at [employer], [employer], Fresh Market)                   Dulaglutide injection  What is this medicine?  DULAGLUTIDE (DOO la GLOO tide) is used to improve blood sugar control in adults with type 2 diabetes. This medicine may be used with other oral diabetes medicines.  How should I use this medicine?  This medicine is for injection under the skin of your upper leg (thigh), stomach area, or upper arm. It is usually given once every week (every 7 days). You will be taught how to prepare and give this medicine. Use exactly as directed. Take your medicine at regular intervals. Do not take it more often than directed.  If you use this medicine with insulin, you should inject this medicine and the insulin separately. Do not  mix them together. Do not give the injections right next to each other. Change (rotate) injection sites with each injection.  It is important that you put your used needles and syringes in a special sharps container. Do not put them in a trash can. If you do not have a sharps container, call your pharmacist or healthcare provider to get one.  A special MedGuide will be given to you by the pharmacist with each prescription and refill. Be sure to read this information carefully each time.  Talk to your pediatrician regarding the use of this medicine in children. Special care may be needed.  What side effects may I notice from receiving this medicine?  Side effects that you should report to your doctor or health care professional as soon as possible:  · allergic reactions like skin rash, itching or hives, swelling of the face, lips, or tongue  · breathing problems  · signs and symptoms of low blood sugar such as feeling anxious, confusion, dizziness, increased hunger, unusually weak or tired, sweating, shakiness, cold, irritable, headache, blurred vision, fast heartbeat, loss of consciousness  · unusual stomach upset or pain  · vomiting  Side effects that usually do not require medical attention (Report these to your doctor or health care professional if they continue or are bothersome.):diarrhea  · heartburn  · loss of appetite  · nausea  · pain, redness, or irritation at site where injected  What may interact with this medicine?  Do not take this medicine with any of the following medications:  · gatifloxacin  Many medications may cause changes in blood sugar, these include:  · alcohol containing beverages  · aspirin and aspirin-like drugs  · chloramphenicol  · chromium  · diuretics  · female hormones, such as estrogens or progestins, birth control pills  · heart medicines  · isoniazid  · male hormones or anabolic steroids  · medications for weight loss  · medicines for allergies, asthma, cold, or cough  · medicines  for mental problems  · medicines called MAO inhibitors - Nardil, Parnate, Marplan, Eldepryl  · niacin  · NSAIDS, such as ibuprofen  · pentamidine  · phenytoin  · probenecid  · quinolone antibiotics such as ciprofloxacin, levofloxacin, ofloxacin  · some herbal dietary supplements  · steroid medicines such as prednisone or cortisone  · thyroid hormonesSome medications can hide the warning symptoms of low blood sugar (hypoglycemia). You may need to monitor your blood sugar more closely if you are taking one of these medications. These include:  · beta-blockers, often used for high blood pressure or heart problems (examples include atenolol, metoprolol, propranolol)  · clonidine  · guanethidine  · reserpine  What if I miss a dose?  If you miss a dose, take it as soon as you can within 3 days after the missed dose. Then take your next dose at your regular weekly time. If it has been longer than 3 days after the missed dose, do not take the missed dose. Take the next dose at your regular time. Do not take double or extra doses. If you have questions about a missed dose, contact your health care provider for advice.  Where should I keep my medicine?  Keep out of the reach of children.  Store this medicine in a refrigerator between 2 and 8 degrees C (36 and 46 degrees F). Do not freeze or use if the medicine has been frozen. Protect from light and excessive heat. Each single-dose pen or prefilled syringe can be kept at room temperature, not to exceed 30 degrees C (86 degrees F) for a total of 14 days, if needed. Store in the carton until use. Throw away any unused medicine after the expiration date.  What should I tell my health care provider before I take this medicine?  They need to know if you have any of these conditions:  · endocrine tumors (MEN 2) or if someone in your family had these tumors  · history of pancreatitis  · kidney disease  · liver disease  · stomach problems  · thyroid cancer or if someone in your  family had thyroid cancer  · an unusual or allergic reaction to dulaglutide, other medicines, foods, dyes, or preservatives  · pregnant or trying to get pregnant  · breast-feeding  What should I watch for while using this medicine?  Visit your doctor or health care professional for regular checks on your progress.  A test called the HbA1C (A1C) will be monitored. This is a simple blood test. It measures your blood sugar control over the last 2 to 3 months. You will receive this test every 3 to 6 months.  Learn how to check your blood sugar. Learn the symptoms of low and high blood sugar and how to manage them.  Always carry a quick-source of sugar with you in case you have symptoms of low blood sugar. Examples include hard sugar candy or glucose tablets. Make sure others know that you can choke if you eat or drink when you develop serious symptoms of low blood sugar, such as seizures or unconsciousness. They must get medical help at once.  Tell your doctor or health care professional if you have high blood sugar. You might need to change the dose of your medicine. If you are sick or exercising more than usual, you might need to change the dose of your medicine.  Do not skip meals. Ask your doctor or health care professional if you should avoid alcohol. Many nonprescription cough and cold products contain sugar or alcohol. These can affect blood sugar.  Wear a medical ID bracelet or chain, and carry a card that describes your disease and details of your medicine and dosage times.  Date Last Reviewed:   NOTE:This sheet is a summary. It may not cover all possible information. If you have questions about this medicine, talk to your doctor, pharmacist, or health care provider. Copyright© 2016 [employer]        Instructions:       Add Trulicity weekly.   Continue Metformin 1000 mg BID.   Continue Novolog 20 units 3x/day with meals. Focus on not missing doses.   Increase Lantus to 55 units nightly.

## 2017-07-05 NOTE — PROGRESS NOTES
CC: Billy Rivera arrives today for management of Type 2 DM and review of chronic medical conditions.      HPI: Mr. Billy Rivera is a 52 y.o. White male who was diagnosed with Type 2 DM around age 39. +family hx in mother and father, and both paternal and maternal grandparents. He was diagnosed based on lab work, though he remembers being fatigued at the time of diagnosis. Treatment began with metformin and insulin was added ~2012.     Chronic conditions include: CAD, HTN, HLD. He experienced a STEMI with 90% blockage to RAD with stent placement in May 2013. Diagnosed with colon CA in 2/2017, had a colon resection in April 2017. No evidence of metastatic disease per Dr. Gomez, notes reviewed.     He was previously seen in [facility] by TYRESE Mckoy NP in June 2015. He was seen last by me in March 2017. T2DM is uncontrolled with A1c 11- 12% over the last 2 years. Adherence to insulin regimen is reported difficult by patient due to his work schedule.     DIABETES COMPLICATIONS: retinopathy and cardiovascular disease Hx of STEMI in 2013.     HOSPITALIZATIONS FOR DIABETES OR RELATED COMPLICATIONS:  No    CURRENT A1C:    Hemoglobin A1C   Date Value Ref Range Status   06/29/2017 11.9 (H) 4.0 - 5.6 % Final     Comment:     According to ADA guidelines, hemoglobin A1c <7.0% represents  optimal control in non-pregnant diabetic patients. Different  metrics may apply to specific patient populations.   Standards of Medical Care in Diabetes-2016.  For the purpose of screening for the presence of diabetes:  <5.7%     Consistent with the absence of diabetes  5.7-6.4%  Consistent with increasing risk for diabetes   (prediabetes)  >or=6.5%  Consistent with diabetes  Currently, no consensus exists for use of hemoglobin A1c  for diagnosis of diabetes for children.  This Hemoglobin A1c assay has significant interference with fetal   hemoglobin   (HbF). The results are invalid for patients with abnormal amounts of   HbF,    including those with known Hereditary Persistence   of Fetal Hemoglobin. Heterozygous hemoglobin variants (HbAS, HbAC,   HbAD, HbAE, HbA2) do not significantly interfere with this assay;   however, presence of multiple variants in a sample may impact the %   interference.     03/24/2017 12.1 (H) 4.5 - 6.2 % Final     Comment:     According to ADA guidelines, hemoglobin A1C <7.0% represents  optimal control in non-pregnant diabetic patients.  Different  metrics may apply to specific populations.   Standards of Medical Care in Diabetes - 2016.  For the purpose of screening for the presence of diabetes:  <5.7%     Consistent with the absence of diabetes  5.7-6.4%  Consistent with increasing risk for diabetes   (prediabetes)  >or=6.5%  Consistent with diabetes  Currently no consensus exists for use of hemoglobin A1C  for diagnosis of diabetes for children.     06/24/2016 11.7 (H) 4.5 - 6.2 % Final       GOAL A1C: < 7.0%     DM MEDICATIONS USED IN THE PAST: Lantus, Novolog, and Metformin     CURRENT DIABETIC MEDS: Metformin 1000 mg BID, Lantus 50 units nightly (between 10PM-12AM), Novolog 20 units TID AC.   Uses Vials or Pens: Pens- needs switch to vials for Novolog per insurance.    Type of Glucose Meter: [employer] Brand Meter.     ANY DIFFICULTY AFFORDING YOUR CURRENT MEDICATION REGIMEN?  No      STANDARDS of CARE:  Statin:  Yes - Lipitor 80 mg  ACEI or ARB:  Yes - Lisinopril 20 mg   ASA:  Yes - 81 mg   Last eye exam: May 2017, Dr. Gray, + DR- bilateral eyes   Last Podiatry Exam: None- missed appointment.    Microalbumin/Creatinine ratio:  Lab Results   Component Value Date    MICALBCREAT 282.2 (H) 03/31/2017       BG readings are checked  2-3 x/day. He did not bring his meter or logs with him today. He reports that he forgot it at home.   Per oral recall:  FBG:  ~150-160's.   Bedtime- if not working- ~ 150's-180's.     Hypoglycemia: No    Skipped/missed glycemic medications: Yes- missing Novolog doses when he is at  work (typically the lunch dose). He reports that he leaves his Novolog at home and does not carry it with him to work. Denies missed doses of Lantus.     Prandial injections taken with meals: Yes    Physical Activity: No formal exercise.     Skipping meals and/or snacking: Eating 2-3 meals/day.   BF- cereal typically.   Lunch- ham sandwich - wheat bread- fruit. - missing Novolog with this meal.   Dinner- meatballs, rice, gravy, corn, chicken, steak- at home usually- takes his Novolog.  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   Drinks diet drinks.       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 Sometimes only sleeping 2-3 hours at night. Working 60-70 hours/week.     MEDICATIONS, ALLERGIES, LABS, VS's, MEDICAL, SURGICAL, SOCIAL, AND FAMILY HISTORY reviewed and updated in the appropriate sections during this encounter    ROS  Constitutional: Negative for weight change.   Endocrine:  Denies polyuria, polydipsia, nocturia.  HENT: Denies neck swelling, lumps, hoarseness or trouble swallowing. Denies any personal or family history of thyroid cancer.    Eyes: Negative for visual disturbance. Wears glasses.    Respiratory: Negative for cough or shortness or breath.  Cardiovascular: Negative for chest pain or claudication.   Gastrointestinal: Negative for nausea, diarrhea, vomiting, bloating.  No history of pancreatitis, pancreatic cancer, or gastroparesis. No GI complaints following colon surgery.   Genitourinary: Negative for urgency, frequency, frequent urinary tract infections.  Skin:  + delayed healing from colon surgery, reports clear drainage from incision- following closely with Dr. Gomez  Neurological: Negative for syncope, numbness/burning of extremities.  Psychiatric/Behavioral: Negative for depression or anxiety + stress      Vital Signs  /82 (BP  "Location: Left arm, Patient Position: Sitting)   Pulse 73   Ht 6' 1" (1.854 m)   Wt 123.5 kg (272 lb 4.3 oz)   BMI 35.92 kg/m²         Chemistry        Component Value Date/Time     06/29/2017 0736    K 4.2 06/29/2017 0736     06/29/2017 0736    CO2 26 06/29/2017 0736    BUN 17 06/29/2017 0736    CREATININE 0.9 06/29/2017 0736     (H) 06/29/2017 0736        Component Value Date/Time    CALCIUM 9.1 06/29/2017 0736    ALKPHOS 107 06/29/2017 0736    AST 16 06/29/2017 0736    ALT 20 06/29/2017 0736    BILITOT 0.6 06/29/2017 0736          Lab Results   Component Value Date    CHOL 126 06/29/2017    CHOL 148 06/24/2016    CHOL 153 06/10/2015     Lab Results   Component Value Date    HDL 36 (L) 06/29/2017    HDL 40 06/24/2016    HDL 40 06/10/2015     Lab Results   Component Value Date    LDLCALC 67.0 06/29/2017    LDLCALC 80.4 06/24/2016    LDLCALC 80.0 06/10/2015     Lab Results   Component Value Date    TRIG 115 06/29/2017    TRIG 138 06/24/2016    TRIG 165 (H) 06/10/2015     Lab Results   Component Value Date    CHOLHDL 28.6 06/29/2017    CHOLHDL 27.0 06/24/2016    CHOLHDL 26.1 06/10/2015       Lab Results   Component Value Date    TSH 1.357 06/29/2017       Lab Results   Component Value Date    MICALBCREAT 282.2 (H) 03/31/2017       No results found for: [id number]      PHYSICAL EXAMINATION  Constitutional: Well developed, well nourished, NAD.   Psych: AAOx3, appropriate mood and affect, pleasant expression, conversant, appears relaxed, well groomed.   Eyes: Conjunctiva and corneas clear.  Neck: Supple, trachea midline, FROM, no thyromegaly or nodules, carotid pulses strong, no bruits.  Respiratory: Resp even and unlabored, CTA bilateral.  Cardiac: RRR, S1, S2 heard, no murmurs; radial pulses +2 bilaterally.   Abdomen: Soft, non-tender, non-distended; +BSs x  4.  Vascular: Same temperature peripherally to centrally, DP pulses +1 bilaterally, + 1 pitting BLE edema.  + dermopathic changes noted " to BLE with shiny, taught appearance. + discoloration to bilateral lower legs.   Neuro: Gait steady.   Skin: Normal skin turgor. Skin warm and dry. No acanthosis nigracans observed. Insulin injection sites with no complications. Noted dressing to lower abdomen/groin from colon surgery.   Feet: No pressure areas, blisters, ulcers. +calluses and dryness noted to bilateral feet. Nails discolored and thickened to bilateral great toes. Wearing sandals.       Assessment/Plan    1. Type 2 diabetes mellitus with hyperglycemia, with long-term current use of insulin   Uncontrolled, A1c remains above goal.   Discussed diagnosis of DM, progression of disease, long term complications and tx options including GLP1 and VGo  Reviewed A1c and BG goals.     He is interested in proceeding with Trulicity today. He would like to check for insurance coverage with the VGo. I believe the VGo would help with compliance as he is missing doses of Novolog daily at lunch time. VGo benefit form completed- will fax to check on insurance coverage. He is aware he will need to attend DM education if we proceed with the VGo.   Discussed at length the importance of insulin compliance and the effects missed doses of insulin have on BG readings. Discussed carrying his Novolog with him at work.     Difficult to safely titrate insulin due to lack of information without BG logs or meter for review.   Also, suspect he is missing more doses of insulin than reported.   Continue Metformin 1000 mg BID and Novolog 20 units TID AC. Change to Novolog vials for insurance coverage. He reports that he has insulin syringes at home.  Increase Lantus to 55 units nightly (10% increase- lack of logs, based on oral recall)   Start Trulicity 0.75 mg weekly X1 month and then increase to 1.5 mg weekly thereafter.   Discussed MOA, side effects, including n/v/pancreatitis/medullary thyroid cancer. Instructed patient to notify me of any n/v/abdominal pain. Discussed proper  dosing and administration.   Discussed with patient that GLP-1 agonists and prandial insulin have not been studied together. Patient aware that the combination may increase the risk of hypoglycemia and agrees to proceed with current treatment regimen.     Urged dose adherence, SMBG 4x/day and urged him to record BG readings and bring logs to clinic visit. Send logs when he gets home today.     Reviewed DM diet, low CHO snack options, and hypoglycemia s/s and treatment.   Discussed the importance of SBGM and medication compliance as the foundation for DM management.     Defer DM education as he missed his appointment and may start the VGo in the future.     Urine MAC with RTC.        2. Type 2 diabetes mellitus with retinopathy of both eyes, with long-term current use of insulin, macular edema presence unspecified, unspecified retinopathy severity   Improve BG readings.   Continue to follow with opthalmology.        3. Type 2 diabetes mellitus with diabetic peripheral angiopathy without gangrene, with long-term current use of insulin   Improve BG readings.   Referral placed to vascular med at last visit. Missed appointment. Has appointment scheduled for 7/7/17.      4. Type 2 diabetes mellitus with diabetic polyneuropathy, with long-term current use of insulin   Improved BG readings.   Reviewed DM foot care.   Referral placed to podiatry at last visit. He missed his appointment. Schedule appointment.       5. Microalbuminuria - Improve BG readings. On ACEi. Monitor. Urine MAC with RTC.        6. Dyslipidemia - LDL goal <70 due to cardiac hx. LDL has improved to goal. On Lipitor 80 mg. LFTs WNL. Continue and Monitor.        7. Hypertension associated with diabetes - BP goal < 140/90. BP at goal.  Encouraged to monitor and record BP at home. Continue medications.      8. NSTEMI May 2013 - peak troponin 0.22 - Avoid hypoglycemia. On ASA.        9. Obesity, Class II, BMI 35-39.9, with comorbidity - Body mass index is  35.92 kg/m². Discussed DM diet and exercise. Increases insulin resistance. Focus on DM diet.        FOLLOW UP  Return in about 3 months (around 10/5/2017).     Orders Placed This Encounter   Procedures    Hemoglobin A1c     Standing Status:   Future     Standing Expiration Date:   9/3/2018    Comprehensive metabolic panel     Standing Status:   Future     Standing Expiration Date:   9/3/2018    Microalbumin/creatinine urine ratio     Standing Status:   Future     Standing Expiration Date:   9/3/2018     Order Specific Question:   Specimen Source     Answer:   Urine

## 2017-07-06 NOTE — TELEPHONE ENCOUNTER
Spoke with pt who advises that his insurance company will no longer cover the NOVOLOG FLEXPEN therefore he needs to have the NOVOLOG VIALS.    He is not using NOVOLIN.    It seems that this medication was sent to his [facility] pharmacy on 7/5/17 by Katerine Green NP.    Please review and advise.    Thanks.

## 2017-07-06 NOTE — TELEPHONE ENCOUNTER
Need clarification.   lantus was sent in yesterday as his basal insulin. I am assuming he will a different mealtime,shortacting insulin other than the novolog that he was on. novolin N 70/30 is not a substitute for but includes janes and short acting insulins

## 2017-07-10 ENCOUNTER — OFFICE VISIT (OUTPATIENT)
Dept: SURGERY | Facility: CLINIC | Age: 52
End: 2017-07-10
Payer: COMMERCIAL

## 2017-07-10 VITALS
HEIGHT: 73 IN | HEART RATE: 68 BPM | WEIGHT: 270.06 LBS | BODY MASS INDEX: 35.79 KG/M2 | DIASTOLIC BLOOD PRESSURE: 79 MMHG | SYSTOLIC BLOOD PRESSURE: 121 MMHG

## 2017-07-10 DIAGNOSIS — C18.7 MALIGNANT NEOPLASM OF SIGMOID COLON: Primary | ICD-10-CM

## 2017-07-10 PROCEDURE — 99999 PR PBB SHADOW E&M-EST. PATIENT-LVL III: CPT | Mod: PBBFAC,,, | Performed by: COLON & RECTAL SURGERY

## 2017-07-10 PROCEDURE — 99213 OFFICE O/P EST LOW 20 MIN: CPT | Mod: S$GLB,,, | Performed by: COLON & RECTAL SURGERY

## 2017-07-14 ENCOUNTER — CLINICAL SUPPORT (OUTPATIENT)
Dept: CARDIOLOGY | Facility: CLINIC | Age: 52
End: 2017-07-14
Payer: COMMERCIAL

## 2017-07-14 DIAGNOSIS — E11.51 TYPE 2 DIABETES, WITH PERIPHERAL CIRCULATORY DISORDER NOT AT GOAL: ICD-10-CM

## 2017-07-14 LAB — VASCULAR ANKLE BRACHIAL INDEX (ABI) LEFT: 1.15 (ref 0.9–1.2)

## 2017-07-14 PROCEDURE — 93924 LWR XTR VASC STDY BILAT: CPT | Mod: S$GLB,,, | Performed by: INTERNAL MEDICINE

## 2017-07-17 NOTE — PROGRESS NOTES
CRS Post-operative visit    Visit Info:     Procedure: Laparoscopic-assisted sigmoid colectomy.    Date of Procedure: April 5, 2017    Indication: 50 yo M, underwent asymptomatic screening colonoscopy 2/17/17:     -Diverticulosis in the sigmoid colon and in the descending colon.  -5 mm polyp in the cecum, removed with a cold snare - hyperplastic  -Granularity at the appendiceal orifice. Biopsied - benign mucosa with lymphoid aggregates  -4 mm polyp in the ascending colon, removed with a hot snare - hyperplastic  -6 mm polyp at the hepatic flexure, removed with a hot snare - hyperplastic  -10 mm pedunculated polyp with a very broad based stalk in the sigmoid colon, removed with a hot snare - INVASIVE ADENOCARCINOMA ARISING FROM AN ADENOMATOUS POLYP WITH HIGH-GRADE DYSPLASIA. (Note: Tumor focally extends through the muscularis mucosa)  -Two 2 to 3 mm polyps at the recto-sigmoid colon, removed with a jumbo cold forceps - hyperplastic/     Further comments regarding the malignant polyp from pathologist: The tumor appears of intermediate differentiation and is focally present at the edges of the biopsies. Although a small margin is demonstrated in one well oriented section, adequate margins are not demonstrated in the other sections submitted. A polyp stalk is not identified within the specimen. Lymphovascular invasion is not identified.    CT A/P showed no evidence of metastatic disease.    His case was reviewed at [facility]S multi-disciplinary conference and consensus was to proceed with segmental colectomy.    Date of Discharge: April 8, 2017    Current Status: Post op course complicated by skin dehiscence and subcutaneous wound infection - being managed with wet-to-dry packings.  Wound healing well, denies erythema, induration or purulence. Energy and apetite are good. He has resumed work and has been packing the wound himself twice daily.  Minimal drainage, no pain.  No F/C.   BM's normal.      Pathology:   NO RESIDUAL  INFILTRATING CARCINOMA IN THE ENTIRELY SUBMITTED INDICATED REGION  FOCUS OF RESIDUAL ADENOMATOUS TRANSFORMATION  19 LYMPH NODE WITH NO METASTATIC CARCINOMA IDENTIFIED  DIVERTICULOSIS WITH FOCI OF ACUTE AND CHRONIC DIVERTICULITIS    Physical Exam:  General: White male in NAD   Neuro: aaox4   Respiratory: resps even unlabored  Abdomen: Soft, NT/ND, lap trocar incisions well healed,  Pfannenstiel incision with 3 cm opening at left lateral aspect that tracks deep and tunnels to the right - wound bed shows healthy granulation tissue, moderate drainage, some fibrinous exudate.  No surrounding induration/erythema/crepitus/fluctuance  Extremities: Warm dry and intact    Assessment:  Malignant colon polyp removed endoscopically, no residual cancer s/p lap sigmoid colectomy  Post-op wound infection wound infection    Plan:  Diet & activity as tolerated  Continue wound packing qd to bid as needed.  RTO 4 weeks for wound check.    Simon Gomez MD, FACS, FASCRS

## 2017-07-24 RX ORDER — ATORVASTATIN CALCIUM 80 MG/1
TABLET, FILM COATED ORAL
Qty: 30 TABLET | Refills: 11 | Status: SHIPPED | OUTPATIENT
Start: 2017-07-24 | End: 2018-02-22 | Stop reason: SDUPTHER

## 2017-07-31 ENCOUNTER — OFFICE VISIT (OUTPATIENT)
Dept: SURGERY | Facility: CLINIC | Age: 52
End: 2017-07-31
Payer: COMMERCIAL

## 2017-07-31 VITALS
BODY MASS INDEX: 35.76 KG/M2 | SYSTOLIC BLOOD PRESSURE: 120 MMHG | HEIGHT: 73 IN | HEART RATE: 62 BPM | DIASTOLIC BLOOD PRESSURE: 78 MMHG | WEIGHT: 269.81 LBS

## 2017-07-31 DIAGNOSIS — E11.65 TYPE 2 DIABETES MELLITUS WITH HYPERGLYCEMIA, WITH LONG-TERM CURRENT USE OF INSULIN: ICD-10-CM

## 2017-07-31 DIAGNOSIS — Z79.4 TYPE 2 DIABETES MELLITUS WITH HYPERGLYCEMIA, WITH LONG-TERM CURRENT USE OF INSULIN: ICD-10-CM

## 2017-07-31 DIAGNOSIS — C18.7 MALIGNANT NEOPLASM OF SIGMOID COLON: Primary | ICD-10-CM

## 2017-07-31 PROCEDURE — 99999 PR PBB SHADOW E&M-EST. PATIENT-LVL III: CPT | Mod: PBBFAC,,, | Performed by: COLON & RECTAL SURGERY

## 2017-07-31 PROCEDURE — 99212 OFFICE O/P EST SF 10 MIN: CPT | Mod: S$GLB,,, | Performed by: COLON & RECTAL SURGERY

## 2017-07-31 RX ORDER — DULAGLUTIDE 0.75 MG/.5ML
INJECTION, SOLUTION SUBCUTANEOUS
Qty: 2 ML | Refills: 0 | OUTPATIENT
Start: 2017-07-31

## 2017-07-31 NOTE — PROGRESS NOTES
CRS Post-operative visit    Visit Info:     Procedure: Laparoscopic-assisted sigmoid colectomy.    Date of Procedure: April 5, 2017    Indication: 51 yo M, underwent asymptomatic screening colonoscopy 2/17/17:     -Diverticulosis in the sigmoid colon and in the descending colon.  -5 mm polyp in the cecum, removed with a cold snare - hyperplastic  -Granularity at the appendiceal orifice. Biopsied - benign mucosa with lymphoid aggregates  -4 mm polyp in the ascending colon, removed with a hot snare - hyperplastic  -6 mm polyp at the hepatic flexure, removed with a hot snare - hyperplastic  -10 mm pedunculated polyp with a very broad based stalk in the sigmoid colon, removed with a hot snare - INVASIVE ADENOCARCINOMA ARISING FROM AN ADENOMATOUS POLYP WITH HIGH-GRADE DYSPLASIA. (Note: Tumor focally extends through the muscularis mucosa)  -Two 2 to 3 mm polyps at the recto-sigmoid colon, removed with a jumbo cold forceps - hyperplastic/     Further comments regarding the malignant polyp from pathologist: The tumor appears of intermediate differentiation and is focally present at the edges of the biopsies. Although a small margin is demonstrated in one well oriented section, adequate margins are not demonstrated in the other sections submitted. A polyp stalk is not identified within the specimen. Lymphovascular invasion is not identified.    CT A/P showed no evidence of metastatic disease.    His case was reviewed at CRS multi-disciplinary conference and consensus was to proceed with segmental colectomy.    Date of Discharge: April 8, 2017    Current Status: Post op course complicated by skin dehiscence and subcutaneous wound infection - managed with wet-to-dry packings.  Wound healing well, denies erythema, induration or purulence. It is now to the point where he cannot pack it.  Energy and apetite are good. He has resumed work and has been packing the wound himself twice daily.  Minimal drainage, no pain.  No F/C.    BM's normal.      Pathology:   NO RESIDUAL INFILTRATING CARCINOMA IN THE ENTIRELY SUBMITTED INDICATED REGION  FOCUS OF RESIDUAL ADENOMATOUS TRANSFORMATION  19 LYMPH NODE WITH NO METASTATIC CARCINOMA IDENTIFIED  DIVERTICULOSIS WITH FOCI OF ACUTE AND CHRONIC DIVERTICULITIS    Physical Exam:  General: White male in NAD   Neuro: aaox4   Respiratory: resps even unlabored  Abdomen: Soft, NT/ND, lap trocar incisions well healed,  Pfannenstiel incision with 1 cm opening at left lateral aspect that tracks approx 2 cm deep - no drainage.  No surrounding induration/erythema/crepitus/fluctuance.  Extremities: Warm dry and intact    Assessment:  Malignant colon polyp removed endoscopically, no residual cancer s/p lap sigmoid colectomy  Post-op wound infection wound infection - nearly resolved    Plan:  Diet & activity as tolerated  Dry dressings as needed  RTO prn.  Colonoscopy 1 year post-op    Simon Gomez MD, FACS, FASCRS

## 2017-07-31 NOTE — TELEPHONE ENCOUNTER
Please call Mr. Rivera and ask him how he is doing on the Trulicity 0.75 mg dose weekly.He should have a printed prescription for the 1.5 mg dose, but I can send in  An electronic prescription if needed.Please ask if he is having nausea or vomiting. Please let me know his responses.

## 2017-08-29 ENCOUNTER — TELEPHONE (OUTPATIENT)
Dept: INTERNAL MEDICINE | Facility: CLINIC | Age: 52
End: 2017-08-29

## 2017-08-29 RX ORDER — INSULIN LISPRO 100 [IU]/ML
20 INJECTION, SOLUTION INTRAVENOUS; SUBCUTANEOUS
Qty: 6 VIAL | Refills: 11 | Status: ON HOLD | OUTPATIENT
Start: 2017-08-29 | End: 2018-06-11

## 2017-08-29 NOTE — TELEPHONE ENCOUNTER
Dose for humalog is same as novolog. Works the same. Difference has to do with the exact chemical preparation process .  Sent in new rx

## 2017-08-29 NOTE — TELEPHONE ENCOUNTER
Spoke with pt who advises that Novolog is not covered (either the vials or the flexpen), but they will cover Humalog.    Pt would like to know what the difference is between the two insulins and Humalog will be ok for him to take.    Please review and advise.    Thanks.

## 2017-08-29 NOTE — TELEPHONE ENCOUNTER
----- Message from Andreina Oakes sent at 8/29/2017  1:12 PM CDT -----  Contact: [phone]  Pt states that insurance company will not pay for current insulin and he needs another script. Please advise

## 2017-10-23 ENCOUNTER — LAB VISIT (OUTPATIENT)
Dept: LAB | Facility: HOSPITAL | Age: 52
End: 2017-10-23
Attending: NURSE PRACTITIONER
Payer: COMMERCIAL

## 2017-10-23 DIAGNOSIS — E11.9 TYPE 2 DIABETES MELLITUS WITHOUT COMPLICATION: ICD-10-CM

## 2017-10-23 DIAGNOSIS — Z79.4 TYPE 2 DIABETES MELLITUS WITH HYPERGLYCEMIA, WITH LONG-TERM CURRENT USE OF INSULIN: ICD-10-CM

## 2017-10-23 DIAGNOSIS — E11.65 TYPE 2 DIABETES MELLITUS WITH HYPERGLYCEMIA, WITH LONG-TERM CURRENT USE OF INSULIN: ICD-10-CM

## 2017-10-23 DIAGNOSIS — Z12.5 SCREENING FOR PROSTATE CANCER: ICD-10-CM

## 2017-10-23 LAB
ALBUMIN SERPL BCP-MCNC: 3.5 G/DL
ALP SERPL-CCNC: 111 U/L
ALT SERPL W/O P-5'-P-CCNC: 37 U/L
ANION GAP SERPL CALC-SCNC: 8 MMOL/L
AST SERPL-CCNC: 29 U/L
BILIRUB SERPL-MCNC: 1 MG/DL
BUN SERPL-MCNC: 18 MG/DL
CALCIUM SERPL-MCNC: 9 MG/DL
CHLORIDE SERPL-SCNC: 101 MMOL/L
CO2 SERPL-SCNC: 27 MMOL/L
COMPLEXED PSA SERPL-MCNC: 0.37 NG/ML
CREAT SERPL-MCNC: 1.1 MG/DL
EST. GFR  (AFRICAN AMERICAN): >60 ML/MIN/1.73 M^2
EST. GFR  (NON AFRICAN AMERICAN): >60 ML/MIN/1.73 M^2
ESTIMATED AVG GLUCOSE: 278 MG/DL
ESTIMATED AVG GLUCOSE: 278 MG/DL
GLUCOSE SERPL-MCNC: 268 MG/DL
HBA1C MFR BLD HPLC: 11.3 %
HBA1C MFR BLD HPLC: 11.3 %
POTASSIUM SERPL-SCNC: 4.1 MMOL/L
PROT SERPL-MCNC: 7.6 G/DL
SODIUM SERPL-SCNC: 136 MMOL/L

## 2017-10-23 PROCEDURE — 36415 COLL VENOUS BLD VENIPUNCTURE: CPT | Mod: PO

## 2017-10-23 PROCEDURE — 83036 HEMOGLOBIN GLYCOSYLATED A1C: CPT

## 2017-10-23 PROCEDURE — 80053 COMPREHEN METABOLIC PANEL: CPT

## 2017-10-23 PROCEDURE — 84153 ASSAY OF PSA TOTAL: CPT

## 2017-10-30 ENCOUNTER — OFFICE VISIT (OUTPATIENT)
Dept: ENDOCRINOLOGY | Facility: CLINIC | Age: 52
End: 2017-10-30
Payer: COMMERCIAL

## 2017-10-30 VITALS
BODY MASS INDEX: 37.16 KG/M2 | WEIGHT: 274.38 LBS | SYSTOLIC BLOOD PRESSURE: 134 MMHG | HEIGHT: 72 IN | DIASTOLIC BLOOD PRESSURE: 88 MMHG | HEART RATE: 73 BPM

## 2017-10-30 DIAGNOSIS — E11.42 TYPE 2 DIABETES MELLITUS WITH DIABETIC POLYNEUROPATHY, WITH LONG-TERM CURRENT USE OF INSULIN: ICD-10-CM

## 2017-10-30 DIAGNOSIS — Z79.4 TYPE 2 DIABETES MELLITUS WITH DIABETIC PERIPHERAL ANGIOPATHY WITHOUT GANGRENE, WITH LONG-TERM CURRENT USE OF INSULIN: ICD-10-CM

## 2017-10-30 DIAGNOSIS — E11.3393 TYPE 2 DIABETES MELLITUS WITH BOTH EYES AFFECTED BY MODERATE NONPROLIFERATIVE RETINOPATHY WITHOUT MACULAR EDEMA, WITH LONG-TERM CURRENT USE OF INSULIN: ICD-10-CM

## 2017-10-30 DIAGNOSIS — I21.4 NSTEMI (NON-ST ELEVATED MYOCARDIAL INFARCTION): ICD-10-CM

## 2017-10-30 DIAGNOSIS — E78.5 DYSLIPIDEMIA: ICD-10-CM

## 2017-10-30 DIAGNOSIS — E11.51 TYPE 2 DIABETES MELLITUS WITH DIABETIC PERIPHERAL ANGIOPATHY WITHOUT GANGRENE, WITH LONG-TERM CURRENT USE OF INSULIN: ICD-10-CM

## 2017-10-30 DIAGNOSIS — Z79.4 TYPE 2 DIABETES MELLITUS WITH HYPERGLYCEMIA, WITH LONG-TERM CURRENT USE OF INSULIN: Primary | ICD-10-CM

## 2017-10-30 DIAGNOSIS — E11.65 TYPE 2 DIABETES MELLITUS WITH HYPERGLYCEMIA, WITH LONG-TERM CURRENT USE OF INSULIN: Primary | ICD-10-CM

## 2017-10-30 DIAGNOSIS — E11.59 HYPERTENSION ASSOCIATED WITH DIABETES: ICD-10-CM

## 2017-10-30 DIAGNOSIS — Z79.4 TYPE 2 DIABETES MELLITUS WITH DIABETIC POLYNEUROPATHY, WITH LONG-TERM CURRENT USE OF INSULIN: ICD-10-CM

## 2017-10-30 DIAGNOSIS — I15.2 HYPERTENSION ASSOCIATED WITH DIABETES: ICD-10-CM

## 2017-10-30 DIAGNOSIS — Z79.4 TYPE 2 DIABETES MELLITUS WITH BOTH EYES AFFECTED BY MODERATE NONPROLIFERATIVE RETINOPATHY WITHOUT MACULAR EDEMA, WITH LONG-TERM CURRENT USE OF INSULIN: ICD-10-CM

## 2017-10-30 DIAGNOSIS — Z91.199 NONCOMPLIANCE: ICD-10-CM

## 2017-10-30 DIAGNOSIS — R80.9 PROTEINURIA, UNSPECIFIED TYPE: ICD-10-CM

## 2017-10-30 PROCEDURE — 99999 PR PBB SHADOW E&M-EST. PATIENT-LVL V: CPT | Mod: PBBFAC,,, | Performed by: NURSE PRACTITIONER

## 2017-10-30 PROCEDURE — 99214 OFFICE O/P EST MOD 30 MIN: CPT | Mod: S$GLB,,, | Performed by: NURSE PRACTITIONER

## 2017-10-30 NOTE — PROGRESS NOTES
CC: Billy Rivera arrives today for management of Type 2 DM and review of chronic medical conditions.      HPI: Mr. Billy Rivera is a 52 y.o. White male who was diagnosed with Type 2 DM around age 39. +family hx in mother and father, and both paternal and maternal grandparents. He was diagnosed based on lab work, though he remembers being fatigued at the time of diagnosis. Treatment began with metformin and insulin was added ~2012.     Chronic conditions include: CAD, HTN, HLD. He experienced a STEMI with 90% blockage to RAD with stent placement in May 2013. Diagnosed with colon CA in 2/2017, had a colon resection in April 2017. No evidence of metastatic disease per Dr. Gomez, notes reviewed.      [facility] was previously seen in [facility] by TYRESE Mckoy NP in June 2015. He was seen last by me in July 2017. T2DM is uncontrolled with A1c 11- 12% over the last 2-3 years. Adherence to insulin regimen is reported difficult by patient due to his work schedule. He is not carrying his insulin.     At his last visit, we discussed the VGo and GLP1. Trulicity was added to his regimen and Lantus was increased. He was taking the Trulicity weekly x 4 weeks; however, he did not get his refill as he reports that pharmacy was suppose to contact our office. Last dose of Trulicity was ~ 2 months ago.   VGo is $ 80 dollars per month- unable to afford.   A1c has decreased to 11.3% from 11.9%.   He reports the last month was rough due to his wife having hip surgery.     DIABETES COMPLICATIONS: retinopathy and cardiovascular disease Hx of STEMI in 2013.     HOSPITALIZATIONS FOR DIABETES OR RELATED COMPLICATIONS:  No    CURRENT A1C:    Hemoglobin A1C   Date Value Ref Range Status   10/23/2017 11.3 (H) 4.0 - 5.6 % Final     Comment:     According to ADA guidelines, hemoglobin A1c <7.0% represents  optimal control in non-pregnant diabetic patients. Different  metrics may apply to specific patient populations.   Standards of  Medical Care in Diabetes-2016.  For the purpose of screening for the presence of diabetes:  <5.7%     Consistent with the absence of diabetes  5.7-6.4%  Consistent with increasing risk for diabetes   (prediabetes)  >or=6.5%  Consistent with diabetes  Currently, no consensus exists for use of hemoglobin A1c  for diagnosis of diabetes for children.  This Hemoglobin A1c assay has significant interference with fetal   hemoglobin   (HbF). The results are invalid for patients with abnormal amounts of   HbF,   including those with known Hereditary Persistence   of Fetal Hemoglobin. Heterozygous hemoglobin variants (HbAS, HbAC,   HbAD, HbAE, HbA2) do not significantly interfere with this assay;   however, presence of multiple variants in a sample may impact the %   interference.     10/23/2017 11.3 (H) 4.0 - 5.6 % Final     Comment:     According to ADA guidelines, hemoglobin A1c <7.0% represents  optimal control in non-pregnant diabetic patients. Different  metrics may apply to specific patient populations.   Standards of Medical Care in Diabetes-2016.  For the purpose of screening for the presence of diabetes:  <5.7%     Consistent with the absence of diabetes  5.7-6.4%  Consistent with increasing risk for diabetes   (prediabetes)  >or=6.5%  Consistent with diabetes  Currently, no consensus exists for use of hemoglobin A1c  for diagnosis of diabetes for children.  This Hemoglobin A1c assay has significant interference with fetal   hemoglobin   (HbF). The results are invalid for patients with abnormal amounts of   HbF,   including those with known Hereditary Persistence   of Fetal Hemoglobin. Heterozygous hemoglobin variants (HbAS, HbAC,   HbAD, HbAE, HbA2) do not significantly interfere with this assay;   however, presence of multiple variants in a sample may impact the %   interference.     06/29/2017 11.9 (H) 4.0 - 5.6 % Final     Comment:     According to ADA guidelines, hemoglobin A1c <7.0% represents  optimal control  in non-pregnant diabetic patients. Different  metrics may apply to specific patient populations.   Standards of Medical Care in Diabetes-2016.  For the purpose of screening for the presence of diabetes:  <5.7%     Consistent with the absence of diabetes  5.7-6.4%  Consistent with increasing risk for diabetes   (prediabetes)  >or=6.5%  Consistent with diabetes  Currently, no consensus exists for use of hemoglobin A1c  for diagnosis of diabetes for children.  This Hemoglobin A1c assay has significant interference with fetal   hemoglobin   (HbF). The results are invalid for patients with abnormal amounts of   HbF,   including those with known Hereditary Persistence   of Fetal Hemoglobin. Heterozygous hemoglobin variants (HbAS, HbAC,   HbAD, HbAE, HbA2) do not significantly interfere with this assay;   however, presence of multiple variants in a sample may impact the %   interference.         GOAL A1C: < 7.0%     DM MEDICATIONS USED IN THE PAST: Lantus, Novolog, and Metformin     CURRENT DIABETIC MEDS: Metformin 1000 mg BID, Lantus 55 units nightly (between 10PM-12AM), Humalog 20 units TID AC + correction 150, ISF 25.   He is not carrying the insulin with him. Unable to afford the VGo.     Uses Vials or Pens:  switched to vials due to insurance reasons.   Type of Glucose Meter: [employer] Brand Meter.     ANY DIFFICULTY AFFORDING YOUR CURRENT MEDICATION REGIMEN?  No      STANDARDS of CARE:  Statin:  Yes - Lipitor 80 mg  ACEI or ARB:  Yes - Lisinopril 20 mg   ASA:  Yes - 81 mg   Last eye exam: May 2017, Dr. Gray, + DR- bilateral eyes   Last Podiatry Exam: None- missed appointments.    Microalbumin/Creatinine ratio:  Lab Results   Component Value Date    [facility]       BG readings are checked  3 x/day. He did not bring his meter or logs with him today. He reports that he forgot it at home as he was rushing out the house.   Per oral recall:  FBG:  ~180's  Before lunch- ~180-200's   Bedtime- if not  "working- ~ 190-200's    Hypoglycemia: No    Skipped/missed glycemic medications: Yes- 1-2x/week missing the evening dose of medications- Lantus, Humalog, and the Metformin.     Prandial injections taken with meals: Yes    Physical Activity: No formal exercise.     Skipping meals and/or snacking: Eating 2-3 meals/day.   BF- egg sandwich or cereal- depends on "what he has time for"   Lunch- ham sandwich - wheat bread- fruit. Plate lunch, [employer] chicken strips.   Dinner- taco [employer], or home cooked- baked chicken.   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   + oc snacking between meals on peanut butter crackers or nuts.   Drinks diet drinks.       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 Sometimes only sleeping 2-3 hours at night. Working 60-70 hours/week.     MEDICATIONS, ALLERGIES, LABS, VS's, MEDICAL, SURGICAL, SOCIAL, AND FAMILY HISTORY reviewed and updated in the appropriate sections during this encounter    ROS  Constitutional: Negative for weight change.   Endocrine:  Denies polyuria, polydipsia, nocturia.  HENT: Denies neck swelling, lumps, hoarseness or trouble swallowing. Denies any personal or family history of thyroid cancer.    Eyes: Negative for visual disturbance. Wears glasses.    Respiratory: Negative for cough or shortness or breath.  Cardiovascular: Negative for chest pain or claudication.   Gastrointestinal: Negative for nausea, diarrhea, vomiting, bloating.  No history of pancreatitis, pancreatic cancer, or gastroparesis. No GI complaints following colon surgery.   Genitourinary: Negative for urgency, frequency, frequent urinary tract infections.  Skin: abd scar healed from colon surgery. Denies skin issues.   Neurological: Negative for syncope, numbness/burning of extremities.  Psychiatric/Behavioral: Negative for depression or anxiety + stress from " work.      Vital Signs  /88 (BP Location: Left arm, Patient Position: Sitting)   Pulse 73   Ht 6' (1.829 m)   Wt 124.5 kg (274 lb 6.4 oz)   BMI 37.22 kg/m²         Chemistry        Component Value Date/Time     10/23/2017 1231    K 4.1 10/23/2017 1231     10/23/2017 1231    CO2 27 10/23/2017 1231    BUN 18 10/23/2017 1231    CREATININE 1.1 10/23/2017 1231     (H) 10/23/2017 1231        Component Value Date/Time    CALCIUM 9.0 10/23/2017 1231    ALKPHOS 111 10/23/2017 1231    AST 29 10/23/2017 1231    ALT 37 10/23/2017 1231    BILITOT 1.0 10/23/2017 1231          Lab Results   Component Value Date    CHOL 126 06/29/2017    CHOL 148 06/24/2016    CHOL 153 06/10/2015     Lab Results   Component Value Date    HDL 36 (L) 06/29/2017    HDL 40 06/24/2016    HDL 40 06/10/2015     Lab Results   Component Value Date    LDLCALC 67.0 06/29/2017    LDLCALC 80.4 06/24/2016    LDLCALC 80.0 06/10/2015     Lab Results   Component Value Date    TRIG 115 06/29/2017    TRIG 138 06/24/2016    TRIG 165 (H) 06/10/2015     Lab Results   Component Value Date    CHOLHDL 28.6 06/29/2017    CHOLHDL 27.0 06/24/2016    CHOLHDL 26.1 06/10/2015       Lab Results   Component Value Date    TSH 1.357 06/29/2017       Lab Results   Component Value Date    MICALBCREAT 578.6 (H) 10/23/2017       No results found for: [id number]      PHYSICAL EXAMINATION  Constitutional: Well developed, well nourished, NAD.   Psych: AAOx3, appropriate mood and affect, pleasant expression, conversant, appears relaxed, well groomed.   Eyes: Conjunctiva and corneas clear.  Neck: Supple, trachea midline, FROM, no thyromegaly or nodules, carotid pulses strong, no bruits.  Respiratory: Resp even and unlabored, CTA bilateral.  Cardiac: RRR, S1, S2 heard, no murmurs; radial pulses +2 bilaterally.   Abdomen: Soft, non-tender, non-distended; +BSs x  4.  Vascular: Same temperature peripherally to centrally, + 1 pitting BLE edema.  + dermopathic  changes noted to BLE with shiny, taught appearance. + discoloration to bilateral lower legs.   Neuro: Gait steady.   Skin: Normal skin turgor. Skin warm and dry. No acanthosis nigracans observed. Insulin injection sites with no complications. Noted healed surgery incision to abdomen/groin from colon surgery.   Feet: Appropriate footwear.     Assessment/Plan    1. Type 2 diabetes mellitus with hyperglycemia, with long-term current use of insulin   Uncontrolled, A1c has slightly decreased but remains above goal.   Discussed diagnosis of DM, progression of disease, long term complications.  Reviewed A1c and BG goals.   Unable to afford the VGo- although he would benefit from it.    Discussed again at length the importance of insulin compliance and the effects missed doses of insulin have on BG readings. Discussed carrying his Novolog with him at work in a lunch kit.    Difficult to safely titrate insulin due to lack of information without BG logs or meter for review.   Also, suspect he is missing more doses of insulin than reported.   Continue Metformin 1000 mg BID, Lantus 55 units nightly, Humalog 20 units TID AC + correction scale goal 150, ISF 25.   Will try Trulicity again.   Start Trulicity 0.75 mg weekly X1 month and then increase to 1.5 mg weekly thereafter. Printed rx for higher dose. Call clinic if unable to start higher dose of Trulicity.   Discussed MOA, side effects, including n/v/pancreatitis/medullary thyroid cancer. Instructed patient to notify me of any n/v/abdominal pain. Discussed proper dosing and administration.   Discussed with patient that GLP-1 agonists and prandial insulin have not been studied together. Patient aware that the combination may increase the risk of hypoglycemia and agrees to proceed with current treatment regimen.     Discussed the importance of SBGM and medication compliance as the foundation for DM management.   Urged dose adherence, SMBG 4x/day and urged him to record BG  readings and bring logs to clinic visit.     Reviewed DM diet, low CHO snack options. Printed material provided.      DM education- MNT     CGMS to assess for hyperglycemia.     Notify clinic of any hypoglycemia episodes with BG readings < 70, if BG readings consistently run <80 or > 240, or for any BG concerns.          2. Type 2 diabetes mellitus with retinopathy of both eyes, with long-term current use of insulin, macular edema presence unspecified, unspecified retinopathy severity   Improve BG readings.   Continue to follow with opthalmology.        3. Type 2 diabetes mellitus with diabetic peripheral angiopathy without gangrene, with long-term current use of insulin   Improve BG readings.   VASILIY results reviewed from July 2017- WNL.          4. Type 2 diabetes mellitus with diabetic polyneuropathy, with long-term current use of insulin   Improved BG readings.   Reviewed DM foot care.   Not interested in podiatry referral at this time.        5. Proteinuria- Improve BG readings. On ACEi. Referral placed to nephrology.        6. Dyslipidemia - LDL goal <70 due to cardiac hx. LDL has improved to goal. On Lipitor 80 mg. LFTs WNL. Continue and Monitor. Lipids with RTC.        7. Hypertension associated with diabetes - BP goal < 140/90. BP at goal.  Encouraged to monitor and record BP at home. Continue medications.        8. NSTEMI May 2013 - peak troponin 0.22 - Avoid hypoglycemia. On ASA.          9. Obesity, Class II, BMI 35-39.9, with comorbidity - Body mass index is 37.22 kg/m². Discussed DM diet and exercise. Increases insulin resistance. Focus on DM diet.        Noncompliance- hindering DM management. Consult placed for case management.       FOLLOW UP  Return in about 6 weeks (around 12/11/2017).     Orders Placed This Encounter   Procedures    Fructosamine     Standing Status:   Future     Standing Expiration Date:   12/29/2018    Comprehensive metabolic panel     Standing Status:   Future     Standing  Expiration Date:   12/29/2018    Lipid panel     Standing Status:   Future     Standing Expiration Date:   12/29/2018    Ambulatory referral to Nephrology     Referral Priority:   Routine     Referral Type:   Consultation     Referral Reason:   Specialty Services Required     Requested Specialty:   Nephrology     Number of Visits Requested:   1    Ambulatory Referral to Diabetes Education     Referral Priority:   Routine     Referral Type:   Consultation     Referral Reason:   Specialty Services Required     Requested Specialty:   Diabetes     Number of Visits Requested:   1    Ambulatory referral to Outpatient Case Management     Referral Priority:   Routine     Referral Type:   Consultation     Referral Reason:   Specialty Services Required     Number of Visits Requested:   1    GLUCOSE MONITORING CONTINUOUS MIN 72 HOURS     Standing Status:   Future     Standing Expiration Date:   10/30/2018

## 2017-10-30 NOTE — PATIENT INSTRUCTIONS
UNDERSTANDING CONTINUOUS GLUCOSE MONITORING     What is continuous glucose monitoring?   Continuous glucose monitoring system (CGMS) is a method used to record your blood sugar levels over a period of time (usually 5 days). Your home blood glucose monitoring is very helpful, but only measures blood glucose levels at various points in time and can miss dangerous high and low patterns, especially during the night while you sleep. Continuous glucose monitoring provides a continuous 24-hour measurement of your blood glucose levels.     Who benefits from continuous glucose monitoring?    People who experience dangerous high and low blood glucose readings.    People who suffer from hypoglycemia unawareness and cannot feel when their blood glucose is dropping.    People who desire better blood glucose control.    People with elevated A1C levels.     How does continuous glucose monitoring work?   A glucose sensor is inserted on the top of your skin on the back of your arm. We do not leave a needle under your skin. The blood glucose sensor measures your blood sugar level every 15 minutes, 24 hours a day. At the end of the 5 days, the CGM Sensor is then downloaded and reviewed so your healthcare provider can see your blood sugar trends and patterns.     What are your responsibilities to ensure successful testing?   It is recommended that you monitor your blood readings as directed by your healthcare provider.  It is vital that you document the correct times of your medications, meals, snacks, blood sugar readings, and any exercise.       How do you prepare for the test?   There is nothing you need to do to prepare for the test. You do not need to fast (restrict food intake) the night before the test.     Revised: 01/2017    © 2015 [facility] [employer] [facility] ([email or web address]) is a non-profit, academic, multi-specialty, healthcare delivery system dedicated to patient care, research and education.       Understanding  Carbohydrates, Fats, and Protein  Food is a source of fuel and nourishment for your body. Its also a source of pleasure. Having diabetes doesnt mean you have to eat special foods or give up desserts. Instead, your dietitian can show you how to plan meals to suit your body. To start, learn how different foods affect blood sugar.  Carbohydrates  Carbohydrates are the main source of fuel for the body. Carbohydrates raise blood sugar. Many people think carbohydrates are only found in pasta or bread. But carbohydrates are actually in many kinds of foods:  · Sugars occur naturally in foods such as fruit, milk, honey, and molasses. Sugars can also be added to many foods, from cereals and yogurt to candy and desserts. Sugars raise blood sugar.  · Starches are found in bread, cereals, pasta, and dried beans. Theyre also found in corn, peas, potatoes, yam, acorn squash, and butternut squash. Starches also raise blood sugar.   · Fiber is found in foods such as vegetables, fruits, beans, and whole grains. Unlike other carbs, fiber isnt digested or absorbed. So it doesnt raise blood sugar. In fact, fiber can help keep blood sugar from rising too fast. It also helps keep blood cholesterol at a healthy level.  Did you know?  Even though carbohydrates raise blood sugar, its best to have some in every meal. They are an important part of a healthy diet.   Fat  Fat is an energy source that can be stored until needed. Fat does not raise blood sugar. However, it can raise blood cholesterol, increasing the risk of heart disease. Fat is also high in calories, which can cause weight gain. Not all types of fat are the same.  More Healthy:  · Monounsaturated fats are mostly found in vegetable oils, such as olive, canola, and peanut oils. They are also found in avocados and some nuts. Monounsaturated fats are healthy for your heart. Thats because they lower LDL (unhealthy) cholesterol.  · Polyunsaturated fats are mostly found in  vegetable oils, such as corn, safflower, and soybean oils. They are also found in some seeds, nuts, and fish. Polyunsaturated fats lower LDL (unhealthy) cholesterol. So, choosing them instead of saturated fats is healthy for your heart. Certain unsaturated fats can help lower triglycerides.   Less Healthy:  · Saturated fats are found in animal products, such as meat, poultry, whole milk, lard, and butter. Saturated fats raise LDL cholesterol and are not healthy for your heart.  · Hydrogenated oils and trans fats are formed when vegetable oils are processed into solid fats. They are found in many processed foods. Hydrogenated oils and trans fats raise LDL cholesterol and lower HDL (healthy) cholesterol. They are not healthy for your heart.  Protein  Protein helps the body build and repair muscle and other tissue. Protein has little or no effect on blood sugar. However, many foods that contain protein also contain saturated fat. By choosing low-fat protein sources, you can get the benefits of protein without the extra fat:  · Plant protein is found in dry beans and peas, nuts, and soy products, such as tofu and soymilk. These sources tend to be cholesterol-free and low in saturated fat.  · Animal protein is found in fish, poultry, meat, cheese, milk, and eggs. These contain cholesterol and can be high in saturated fat. Aim for lean, lower-fat choices.  Date Last Reviewed: 3/1/2016  © 1877-5000 [employer]. [address] [place] [address]. All rights reserved. This information is not intended as a substitute for professional medical care. Always follow your healthcare professional's instructions.        Diet: Diabetes  Food is an important tool that you can use to control diabetes and stay healthy. Eating well-balanced meals in the correct amounts will help you control your blood glucose levels and prevent low blood sugar reactions. It will also help you reduce the health risks of diabetes.  There is no one specific diet that is right for everyone with diabetes. But there are general guidelines to follow. A registered dietitian (RD) will create a tailored diet approach thats just right for you. He or she will also help you plan healthy meals and snacks. If you have any questions, call your dietitian for advice.     Guidelines for success  Talk with your healthcare provider before starting a diabetes diet or weight loss program. If you haven't talked with a dietitian yet, ask your provider for a referral. The following guidelines can help you succeed:  · Select foods from the 6 food groups below. Your dietitian will help you find food choices within each group. He or she will also show you serving sizes and how many servings you can have at each meal.  ¨ Grains, beans, and starchy vegetables  ¨ Vegetables  ¨ Fruit  ¨ Milk or yogurt  ¨ Meat, poultry, fish, or tofu  ¨ Healthy fats  · Check your blood sugar levels as directed by your provider. Take any medicine as prescribed by your provider.  · Learn to read food labels and pick the right portion sizes.  · Eat only the amount of food in your meal plan. Eat about the same amount of food at regular times each day. Dont skip meals. Eat meals 4 to 5 hours apart, with snacks in between.  · Limit alcohol. It raises blood sugar levels. Drink water or calorie-free diet drinks that use safe sweeteners.  · Eat less fat to help lower your risk of heart disease. Use nonfat or low-fat dairy products and lean meats. Avoid fried foods. Use cooking oils that are unsaturated, such as olive, canola, or peanut oil.  · Talk with your dietitian about safe sugar substitutes.  · Avoid added salt. It can contribute to high blood pressure, which can cause heart disease. People with diabetes already have a risk of high blood pressure and heart disease.  · Stay at a healthy weight. If you need to lose weight, cut down on your portion sizes. But dont skip meals. Exercise is an  important part of any weight management program. Talk with your provider about an exercise program thats right for you.  · For more information about the best diet plan for you, talk with a registered dietitian (RD). To find an RD in your area, contact:  ¨ Academy of Nutrition and Dietetics www.eatright.org  ¨ The American Diabetes Association [phone] www.diabetes.org  Date Last Reviewed: 8/1/2016 © 2000-2017 [employer]. [address] [place] [address]. All rights reserved. This information is not intended as a substitute for professional medical care. Always follow your healthcare professional's instructions.        Eating Out When You Have Diabetes  Eating right is an important part of keeping your blood sugar in your target range. You just need to make healthy choices.    Tips for restaurant meals  When you eat away from home try these tips:  · Try to schedule your dining-out meal at your normal meal time. Make a reservation if possible, so you don't have to wait to eat. If you can't make a reservation, try to arrive at the restaurant at a less-busy time to cut down your wait time. Eat a small fruit or starch snack at your regular mealtime if your restaurant meal is going to be later than usual.   · Call ahead to see if the restaurant can meet your dietary needs if you've never been there before. Or you can go online to see the menu ahead of time.  · Carry some crackers with you in case the restaurant needs you to wait until you can be served.  · Ask how foods are prepared before you order.  · Instead of fried, sautéed, or breaded foods, choose ones that are broiled, steamed, grilled, or baked.  · Ask for sauces, gravies, and dressings on the side.  · Only eat an amount that fits your meal plan. Remember: You can take home the leftovers.  · Save dessert for special occasions. Then choose a small dessert or share one with a friend or family member.  Make healthy choices  Fast  food  · Garden salad with light dressing on the side  · Baked potato with vegetables or herbs  · Broiled, roasted, or grilled chicken sandwich  · Sliced turkey or lean roast beef sandwich  Mexican  · Chicken enchilada, without cheese or sour cream   · Small burrito with whole beans and chicken  · Whole beans (not refried) and rice  · Chicken or fish fajitas  Steakhouse  · Grilled or broiled lean cuts of beef  · Baked potato with vegetables or herbs  · Broiled or baked chicken. Dont eat the skin.  · Steamed vegetables  Asian  · Steamed dumplings or potstickers  · Broiled, boiled, or steamed meats or fish  · Sushi or sashimi  · Steamed rice or boiled noodles. One serving is equal to 1/3 cup.  Date Last Reviewed: 6/1/2016  © 8226-1487 [employer]. [address] [place] [address]. All rights reserved. This information is not intended as a substitute for professional medical care. Always follow your healthcare professional's instructions.        Getting Support When You Have Diabetes    The job of controlling your blood sugar is mostly up to you. But your diabetes healthcare team is there to help. These experts will teach you how to manage diabetes and the health risks it brings. With practice, controlling your blood sugar will become a habit.  Working with your healthcare team  Your diabetes healthcare team will work closely with you to create a management plan. The goal is to keep your blood sugar controlled to delay or prevent other health problems. Your healthcare team is likely to include:  · A primary care provider, who might be your regular healthcare provider. This may have been the first person to tell you about diabetes.  · An endocrinologist, a healthcare provider specialized in treating people with diabetes, though most people with diabetes may not need to see an endocrinologist.   · A registered dietitian, who will teach you how food and healthy eating can help you control blood  sugar.  · A diabetes educator, who might be a nurse, dietitian, or pharmacist, will teach you all about managing diabetes.  · A health psychologist or , who can help you cope with the feelings and stresses that diabetes may bring.  · Other healthcare team members can include an eye healthcare provider, dentist, podiatrist, pharmacist, occupational therapist, and exercise physiologist   Who else can help?  Daily diabetes management can be a lot to handle. It might affect your routine, at home and on the job. Dont be afraid to let your family, friends, and work supervisor know about your condition. These people can support your need to stick to a schedule that meets your treatment plan.  Reach out to your family and friends  Family and friends can support your efforts to take care of yourself. So dont feel bad asking for help when you need it. If they have questions or arent taking your diagnosis seriously, ask them to learn along with you. At first, it might be hard for them to understand some of the changes you are making. Tell them that your health is your priority. Their support can help keep you focused and confident as you learn to control your blood sugar.  Date Last Reviewed: 6/1/2016  © 1456-1913 [employer]. [address] [place] [address], [place] [address]. All rights reserved. This information is not intended as a substitute for professional medical care. Always follow your healthcare professional's instructions.        Healthy Meals for Diabetes     A healthcare provider will help you develop a meal plan that fits your needs.   Ask your healthcare team to help you make a meal plan that fits your needs. Your meal plan tells you when to eat your meals and snacks, what kinds of foods to eat, and how much of each food to eat. You dont have to give up all the foods you like. But you do need to follow some guidelines.  Choose healthy carbohydrates  Starches, sugars, and fiber are all  types of carbohydrates. Fiber can help lower your cholesterol and triglycerides. Fiber is also healthy for your heart. You should have 20 to 35 grams of total fiber each day. Fiber-rich foods include:  · Whole-grain breads and cereals  · Bulgur wheat  · Brown rice     · Whole-wheat pasta  · Fruits and vegetables  · Dry beans, and peas   Keep track of the amount of carbohydrates you eat. This can help you keep the right balance of physical activity and medicine. The amount of carbohydrates needed will vary for each person. It depends on many things such as your health, the medicines you take, and how active you are. Your healthcare team will help you figure out the right amount of carbohydrates for you. You may start with around 45 to 60 grams of carbohydrates per meal, depending on your situation.   Here are some examples of foods containing about 15 grams of carbohydrates (1 serving of carbohydrates):  · 1/2 cup of canned or frozen fruit  · A small piece of fresh fruit (4 ounces)  · 1 slice of bread  · 1/2 cup of oatmeal  · 1/3 cup of rice  · 4 to 6 crackers  · 1/2 English muffin  · 1/2 cup of black beans  · 1/4 of a large baked potato (3 ounces)  · 2/3 cup of plain fat-free yogurt  · 1 cup of soup  · 1/2 cup of casserole  · 6 chicken nuggets  · 2-inch-square brownie or cake without frosting  · 2 small cookies  · 1/2 cup of ice cream or sherbet  Choose healthy protein foods  Eating protein that is low in fat can help you control your weight. It also helps keep your heart healthy. Low-fat protein foods include:  · Fish  · Plant proteins, such as dry beans and peas, nuts, and soy products like tofu and soymilk  · Lean meat with all visible fat removed  · Poultry with the skin removed  · Low-fat or nonfat milk, cheese, and yogurt  Limit unhealthy fats and sugar  Saturated and trans fats are unhealthy for your heart. They raise LDL (bad) cholesterol. Fat is also high in calories, so it can make you gain weight. To cut  down on unhealthy fats and sugar, limit these foods:  · Butter or margarine  · Palm and palm kernel oils and coconut oil  · Cream  · Cheese  · Davis  · Lunch meats     · Ice cream  · Sweet bakery goods such as pies, muffins, and donuts  · Jams and jellies  · Candy bars  · Regular sodas   How much to eat  The amount of food you eat affects your blood sugar. It also affects your weight. Your healthcare team will tell you how much of each type of food you should eat.  · Use measuring cups and spoons and a food scale to measure serving sizes.  · Learn what a correct serving size looks like on your plate. This will help when you are away from home and cant measure your servings.  · Eat only the number of servings given on your meal plan for each food. Dont take seconds.  · Learn to read food labels. Be sure to look at serving size, total carbohydrates, fiber, calories, sugar, and saturated and trans fats. Look for healthier alternatives to foods that have added sugar.  · Plan ahead for parties so you can still have a good time without going overboard with unhealthy food choices. Set a good example yourself by bringing a healthy dish to pot lucks.   Choose healthy snacks  When it comes to snacks, we usually think about foods with added sugar and fats. But there are many other options for healthier snack choices. Here are a few snack ideas to choose from:  Snacks with less than 5 grams of carbohydrates  · 1 piece of string cheese  · 3 celery sticks plus 1 tablespoon of peanut butter  · 5 cherry tomatoes plus 1 tablespoon of ranch dressing  · 1 hard-boiled egg  · 1/4 cup of fresh blueberries  ·  5 baby carrots  · 1 cup of light popcorn  · 1/2 cup of sugar-free gelatin  · 15 almonds  Snacks with about 10 to 20 grams of carbohydrates  · 1/3 cup of hummus plus 1 cup of fresh cut nonstarchy vegetables (carrots, green peppers, broccoli, celery, or a combination)  · 1/2 cup of fresh or canned fruit plus 1/4 cup of cottage  cheese  · 1/2 cup of tuna salad with 4 crackers  · 2 rice cakes and a tablespoon of peanut butter  · 1 small apple or orange  · 3 cups light popcorn  · 1/2 of a turkey sandwich (1 slice of whole-wheat bread, 2 ounces of turkey, and mustard)  Portion sizes are important to controlling your blood sugar and staying at a healthy weight. Stock up on healthy snack items so you always have them on hand.  When to eat  Your meal plan will likely include breakfast, lunch, dinner, and some snacks.  · Try to eat your meals and snacks at about the same times each day.  · Eat all your meals and snacks. Skipping a meal or snack can make your blood sugar drop too low. It can also cause you to eat too much at the next meal or snack. Then your blood sugar could get too high.  Date Last Reviewed: 7/1/2016  © 4390-7621 [employer]. [address] [place] [address]. All rights reserved. This information is not intended as a substitute for professional medical care. Always follow your healthcare professional's instructions.      Snacks can be an important part of a balanced, healthy meal plan. They allow you to eat more frequently, feeling full and satisfied throughout the day. Also, they allow you to spread carbohydrates evenly, which may stabilize blood sugars.  Plus, snacks are enjoyable!     The amount of carbohydrate needed at snacks varies. Generally, about 15-30 grams of carbohydrate per snack is recommended.  Below you will find some tasty treats.       0-5 gm carb   Crystal Light   Vitamin Water Zero   Herbal tea, unsweetened   2 tsp peanut butter on celery   1./2 cup sugar-free jell-o   1 sugar-free popsicle   ¼ cup blueberries   8oz Blue Mari unsweetened almond milk   5 baby carrots & celery sticks, cucumbers, bell peppers dipped in ¼ cup salsa, 2Tbsp light ranch dressing or 2Tbsp plain Greek yogurt   10 Goldfish crackers   ½ oz low-fat cheese or string cheese   1 closed handful of nuts,  unsalted   1 Tbsp of sunflower seeds, unsalted   1 cup Smart Pop popcorn   1 whole grain brown rice cake        15 gm carb   1 small piece of fruit or ½ banana or 1/2 cup lite canned fruit   3 arik cracker squares   3 cups Smart Pop popcorn, top spray butter, Cook lite salt or cinnamon and Truvia   5 Vanilla Wafers   ½ cup low fat, no added sugar ice cream or frozen yogurt (Blue bell, Blue Bunny, Weight Watchers, Skinny Cow)   ½ turkey, ham, or chicken sandwich   ½ c fruit with ½ c Cottage cheese   4-6 unsalted wheat crackers with 1 oz low fat cheese or 1 tbsp peanut butter    30-45 goldfish crackers (depending on flavor)    7-8 Jewish mini brown rice cakes (caramel, apple cinnamon, chocolate)    12 Jewish mini brown rice cakes (cheddar, bbq, ranch)    1/3 cup hummus dip with raw veg   1/2 whole wheat aundrea, 1Tbsp hummus   Mini Pizza (1/2 whole wheat English muffin, low-fat  cheese, tomato sauce)   100 calorie snack pack (Oreo, Chips Ahoy, Ritz Mix, Baked Cheetos)   4-6 oz. light or Greek Style yogurt (Chobani, Yoplait, Okios, [place]oneyfield)   ½ cup sugar-free pudding     6 in. wheat tortilla or aundrea oven toasted chips (topped with spray butter flavoring, cinnamon, Truvia OR spray butter, garlic powder, chili powder)    18 BBQ Popchips (available at [employer], [employer] Foods, Fresh Market)

## 2017-12-26 RX ORDER — LISINOPRIL 20 MG/1
TABLET ORAL
Qty: 30 TABLET | Refills: 6 | Status: SHIPPED | OUTPATIENT
Start: 2017-12-26 | End: 2018-02-22

## 2018-02-22 ENCOUNTER — OFFICE VISIT (OUTPATIENT)
Dept: INTERNAL MEDICINE | Facility: CLINIC | Age: 53
End: 2018-02-22
Payer: COMMERCIAL

## 2018-02-22 ENCOUNTER — LAB VISIT (OUTPATIENT)
Dept: LAB | Facility: HOSPITAL | Age: 53
End: 2018-02-22
Attending: INTERNAL MEDICINE
Payer: COMMERCIAL

## 2018-02-22 VITALS
TEMPERATURE: 99 F | HEIGHT: 74 IN | SYSTOLIC BLOOD PRESSURE: 146 MMHG | HEART RATE: 59 BPM | BODY MASS INDEX: 34.83 KG/M2 | DIASTOLIC BLOOD PRESSURE: 86 MMHG | WEIGHT: 271.38 LBS | RESPIRATION RATE: 16 BRPM

## 2018-02-22 DIAGNOSIS — I10 ESSENTIAL HYPERTENSION: ICD-10-CM

## 2018-02-22 DIAGNOSIS — E11.3393 TYPE 2 DIABETES MELLITUS WITH BOTH EYES AFFECTED BY MODERATE NONPROLIFERATIVE RETINOPATHY WITHOUT MACULAR EDEMA, WITH LONG-TERM CURRENT USE OF INSULIN: Primary | ICD-10-CM

## 2018-02-22 DIAGNOSIS — E11.65 TYPE 2 DIABETES MELLITUS WITH HYPERGLYCEMIA, WITH LONG-TERM CURRENT USE OF INSULIN: ICD-10-CM

## 2018-02-22 DIAGNOSIS — I25.10 CORONARY ARTERY DISEASE INVOLVING NATIVE CORONARY ARTERY OF NATIVE HEART WITHOUT ANGINA PECTORIS: ICD-10-CM

## 2018-02-22 DIAGNOSIS — Z79.4 TYPE 2 DIABETES MELLITUS WITH BOTH EYES AFFECTED BY MODERATE NONPROLIFERATIVE RETINOPATHY WITHOUT MACULAR EDEMA, WITH LONG-TERM CURRENT USE OF INSULIN: Primary | ICD-10-CM

## 2018-02-22 DIAGNOSIS — Z79.4 TYPE 2 DIABETES MELLITUS WITH HYPERGLYCEMIA, WITH LONG-TERM CURRENT USE OF INSULIN: ICD-10-CM

## 2018-02-22 DIAGNOSIS — E11.29 DIABETES MELLITUS WITH MICROALBUMINURIA: ICD-10-CM

## 2018-02-22 DIAGNOSIS — R80.9 DIABETES MELLITUS WITH MICROALBUMINURIA: ICD-10-CM

## 2018-02-22 DIAGNOSIS — E11.59 HYPERTENSION ASSOCIATED WITH DIABETES: ICD-10-CM

## 2018-02-22 DIAGNOSIS — I15.2 HYPERTENSION ASSOCIATED WITH DIABETES: ICD-10-CM

## 2018-02-22 PROBLEM — Z91.199 NONCOMPLIANCE: Status: RESOLVED | Noted: 2017-10-30 | Resolved: 2018-02-22

## 2018-02-22 LAB
ALBUMIN SERPL BCP-MCNC: 3 G/DL
ALP SERPL-CCNC: 109 U/L
ALT SERPL W/O P-5'-P-CCNC: 64 U/L
ANION GAP SERPL CALC-SCNC: 9 MMOL/L
AST SERPL-CCNC: 51 U/L
BILIRUB SERPL-MCNC: 0.6 MG/DL
BUN SERPL-MCNC: 23 MG/DL
CALCIUM SERPL-MCNC: 9.4 MG/DL
CHLORIDE SERPL-SCNC: 98 MMOL/L
CO2 SERPL-SCNC: 26 MMOL/L
CREAT SERPL-MCNC: 1.1 MG/DL
EST. GFR  (AFRICAN AMERICAN): >60 ML/MIN/1.73 M^2
EST. GFR  (NON AFRICAN AMERICAN): >60 ML/MIN/1.73 M^2
ESTIMATED AVG GLUCOSE: 232 MG/DL
GLUCOSE SERPL-MCNC: 349 MG/DL
HBA1C MFR BLD HPLC: 9.7 %
POTASSIUM SERPL-SCNC: 4.4 MMOL/L
PROT SERPL-MCNC: 7.6 G/DL
SODIUM SERPL-SCNC: 133 MMOL/L

## 2018-02-22 PROCEDURE — 80053 COMPREHEN METABOLIC PANEL: CPT

## 2018-02-22 PROCEDURE — 83036 HEMOGLOBIN GLYCOSYLATED A1C: CPT

## 2018-02-22 PROCEDURE — 99214 OFFICE O/P EST MOD 30 MIN: CPT | Mod: S$GLB,,, | Performed by: INTERNAL MEDICINE

## 2018-02-22 PROCEDURE — 3008F BODY MASS INDEX DOCD: CPT | Mod: S$GLB,,, | Performed by: INTERNAL MEDICINE

## 2018-02-22 PROCEDURE — 36415 COLL VENOUS BLD VENIPUNCTURE: CPT | Mod: PO

## 2018-02-22 PROCEDURE — 99999 PR PBB SHADOW E&M-EST. PATIENT-LVL III: CPT | Mod: PBBFAC,,,

## 2018-02-22 RX ORDER — LISINOPRIL 40 MG/1
40 TABLET ORAL DAILY
Qty: 30 TABLET | Refills: 11 | Status: SHIPPED | OUTPATIENT
Start: 2018-02-22 | End: 2019-03-21 | Stop reason: SDUPTHER

## 2018-02-22 RX ORDER — METFORMIN HYDROCHLORIDE 1000 MG/1
1000 TABLET ORAL 2 TIMES DAILY WITH MEALS
Qty: 60 TABLET | Refills: 6 | Status: SHIPPED | OUTPATIENT
Start: 2018-02-22 | End: 2019-03-21 | Stop reason: SDUPTHER

## 2018-02-22 RX ORDER — CARVEDILOL 12.5 MG/1
12.5 TABLET ORAL 2 TIMES DAILY
Qty: 60 TABLET | Refills: 3 | Status: ON HOLD | OUTPATIENT
Start: 2018-02-22 | End: 2018-06-10 | Stop reason: HOSPADM

## 2018-02-22 RX ORDER — ATORVASTATIN CALCIUM 80 MG/1
80 TABLET, FILM COATED ORAL DAILY
Qty: 30 TABLET | Refills: 11 | Status: SHIPPED | OUTPATIENT
Start: 2018-02-22 | End: 2019-02-23 | Stop reason: SDUPTHER

## 2018-02-22 NOTE — PROGRESS NOTES
Subjective:       Patient ID: Billy Rivera is a 52 y.o. male.    Chief Complaint: Follow-up    Mr Rivera is a 53 yo M with HPLD, HTN, DM2 last A1C 11% seen in [facility] in 10/2017 who presents to clinic today with needs for HTN/DM follow up and medication refills. The patient reports no acute interval complaints, denies foot sores or neuropathy. He reports having started a 20u humilin prandial/ 55u basal regimen, weekly Trulicity and metformin PO daily at his last clinic visit in October. He reports a recent URI and some residual mild nasal drip/ cough which he is recovering from well. He denies sputum production, fever, chills. He reports full compliance with medications, but notes issues with dietary restrictions and low carbohydrate diets, seeing as he works 70 hours/ week as a  and .       Review of Systems   Constitutional: Negative for chills, diaphoresis, fatigue, fever and unexpected weight change.   HENT: Positive for congestion. Negative for postnasal drip, sore throat and trouble swallowing.    Eyes: Negative for pain and visual disturbance.   Respiratory: Positive for cough. Negative for chest tightness, shortness of breath and wheezing.    Cardiovascular: Positive for leg swelling. Negative for chest pain and palpitations.   Gastrointestinal: Negative for abdominal distention, abdominal pain, constipation, diarrhea, nausea and vomiting.   Endocrine: Negative for polyphagia and polyuria.   Genitourinary: Negative for flank pain, testicular pain and urgency.   Musculoskeletal: Negative for arthralgias, back pain, gait problem and joint swelling.   Skin: Negative for color change, pallor and rash.   Neurological: Negative for weakness, light-headedness, numbness and headaches.   Hematological: Negative for adenopathy.   Psychiatric/Behavioral: Negative for dysphoric mood and sleep disturbance. The patient is not nervous/anxious.        Objective:     BP (!) 146/86  "(BP Location: Left arm, Patient Position: Sitting, BP Method: Large (Automatic))   Pulse (!) 59   Temp 98.5 °F (36.9 °C) (Oral)   Resp 16   Ht 6' 2" (1.88 m)   Wt 123.1 kg (271 lb 6.2 oz)   BMI 34.84 kg/m²   Physical Exam   Constitutional: He is oriented to person, place, and time. No distress.   HENT:   Right Ear: External ear normal.   Left Ear: External ear normal.   Mouth/Throat: Oropharynx is clear and moist.   Eyes: EOM are normal. Pupils are equal, round, and reactive to light. Right eye exhibits no discharge. Left eye exhibits no discharge. No scleral icterus.   Neck: Normal range of motion. No JVD present. No tracheal deviation present.   Cardiovascular: Regular rhythm, normal heart sounds and intact distal pulses.  Exam reveals no gallop and no friction rub.    No murmur heard.  Pulmonary/Chest: Effort normal and breath sounds normal. No respiratory distress. He has no wheezes. He has no rales. He exhibits no tenderness.   Abdominal: Soft. Bowel sounds are normal. He exhibits no distension and no mass. There is no tenderness. There is no rebound and no guarding.   Musculoskeletal: Normal range of motion. He exhibits edema. He exhibits no tenderness.   Diffuse onychomycosis    Neurological: He is alert and oriented to person, place, and time. No cranial nerve deficit. Coordination normal.   Skin: Skin is warm. Capillary refill takes less than 2 seconds. No rash noted. He is not diaphoretic. No erythema. No pallor.   Psychiatric: He has a normal mood and affect.       Assessment:       1. Type 2 diabetes mellitus 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, with long-term current use of insulin    2. Coronary artery disease involving native coronary artery of native heart without angina pectoris    3. Essential hypertension    4. Obesity, Class II, BMI 35-39.9, with comorbidity    5. Type 2 diabetes mellitus with hyperglycemia, with long-term current use of " insulin    6. Diabetes mellitus with microalbuminuria    7. Hypertension associated with diabetes        Plan:       1. Type 2 diabetes mellitus 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, with long-term current use of insulin  -Refilled PO meds will order below:   - metFORMIN (GLUCOPHAGE) 1000 MG tablet; Take 1 tablet (1,000 mg total) by mouth 2 (two) times daily with meals.  Dispense: 60 tablet; Refill: 6  - Comprehensive metabolic panel; Future  - Hemoglobin A1c; Future  - Microalbumin/creatinine urine ratio; Future  -instructed to follow back with [facility].     2. Coronary artery disease involving native coronary artery of native heart without angina pectoris  -reviewed symptoms; no acute symptoms. GDMT with statin/ ASA/ BB/ ACEi. titrating regimen today    3. Essential hypertension  -increase lisinopril from 20mg to 40mg QD    4. Obesity, Class II, BMI 35-39.9, with comorbidity  -lifestyle modification counseling.     5. Type 2 diabetes mellitus with hyperglycemia, with long-term current use of insulin  Refilled PO meds will order below:   - metFORMIN (GLUCOPHAGE) 1000 MG tablet; Take 1 tablet (1,000 mg total) by mouth 2 (two) times daily with meals.  Dispense: 60 tablet; Refill: 6  - Comprehensive metabolic panel; Future  - Hemoglobin A1c; Future  - Microalbumin/creatinine urine ratio; Future    6. Diabetes mellitus with microalbuminuria  -check Urine Pr/Cr    7. Hypertension associated with diabetes  - increase lisinopril from 20mg to 40mg QD    Rohan Bell MD  PGY-2 House Staff, Internal Medicine

## 2018-04-02 ENCOUNTER — TELEPHONE (OUTPATIENT)
Dept: INTERNAL MEDICINE | Facility: CLINIC | Age: 53
End: 2018-04-02

## 2018-04-03 NOTE — TELEPHONE ENCOUNTER
Advise recent a1c improved but still not optimal at 9.7  He has been followed by Endo for his diabetes, if he is going to continue such he follow up them for management recommendations

## 2018-04-05 DIAGNOSIS — Z79.4 TYPE 2 DIABETES MELLITUS WITH HYPERGLYCEMIA, WITH LONG-TERM CURRENT USE OF INSULIN: ICD-10-CM

## 2018-04-05 DIAGNOSIS — E11.65 TYPE 2 DIABETES MELLITUS WITH HYPERGLYCEMIA, WITH LONG-TERM CURRENT USE OF INSULIN: ICD-10-CM

## 2018-04-05 RX ORDER — INSULIN GLARGINE 100 [IU]/ML
INJECTION, SOLUTION SUBCUTANEOUS
Qty: 4 BOX | Refills: 3 | Status: SHIPPED | OUTPATIENT
Start: 2018-04-05 | End: 2018-05-29 | Stop reason: SDUPTHER

## 2018-05-29 ENCOUNTER — OFFICE VISIT (OUTPATIENT)
Dept: FAMILY MEDICINE | Facility: CLINIC | Age: 53
End: 2018-05-29
Payer: COMMERCIAL

## 2018-05-29 VITALS
TEMPERATURE: 98 F | WEIGHT: 270.31 LBS | RESPIRATION RATE: 20 BRPM | SYSTOLIC BLOOD PRESSURE: 114 MMHG | DIASTOLIC BLOOD PRESSURE: 78 MMHG | BODY MASS INDEX: 35.82 KG/M2 | HEIGHT: 73 IN

## 2018-05-29 DIAGNOSIS — E11.51 TYPE 2 DIABETES MELLITUS WITH DIABETIC PERIPHERAL ANGIOPATHY WITHOUT GANGRENE, WITH LONG-TERM CURRENT USE OF INSULIN: ICD-10-CM

## 2018-05-29 DIAGNOSIS — L03.115 CELLULITIS OF RIGHT FOOT: Primary | ICD-10-CM

## 2018-05-29 DIAGNOSIS — Z79.4 TYPE 2 DIABETES MELLITUS WITH DIABETIC PERIPHERAL ANGIOPATHY WITHOUT GANGRENE, WITH LONG-TERM CURRENT USE OF INSULIN: ICD-10-CM

## 2018-05-29 PROCEDURE — 99214 OFFICE O/P EST MOD 30 MIN: CPT | Mod: S$GLB,,, | Performed by: INTERNAL MEDICINE

## 2018-05-29 PROCEDURE — 99999 PR PBB SHADOW E&M-EST. PATIENT-LVL III: CPT | Mod: PBBFAC,,, | Performed by: INTERNAL MEDICINE

## 2018-05-29 RX ORDER — CLINDAMYCIN HYDROCHLORIDE 300 MG/1
300 CAPSULE ORAL EVERY 6 HOURS
Qty: 56 CAPSULE | Refills: 0 | Status: ON HOLD | OUTPATIENT
Start: 2018-05-29 | End: 2018-06-07

## 2018-05-29 RX ORDER — CIPROFLOXACIN 500 MG/1
500 TABLET ORAL EVERY 12 HOURS
Qty: 28 TABLET | Refills: 0 | Status: ON HOLD | OUTPATIENT
Start: 2018-05-29 | End: 2018-06-07

## 2018-05-29 NOTE — PATIENT INSTRUCTIONS
Discharge Instructions for Cellulitis  You have been diagnosed with cellulitis. This is an infection in the deepest layer of the skin. In some cases, the infection also affects the muscle. Cellulitis is caused by bacteria. The bacteria can enter the body through broken skin. This can happen with a cut, scratch, animal bite, or an insect bite that has been scratched. You may have been treated in the hospital with antibiotics and fluids. You will likely be given a prescription for antibiotics to take at home. This sheet will help you take care of yourself at home.  Home care  When you are home:  · Take the prescribed antibiotic medicine you are given as directed until it is gone. Take it even if you feel better. It treats the infection and stops it from returning. Not taking all the medicine can make future infections hard to treat.  · Keep the infected area clean.  · When possible, raise the infected area above the level of your heart. This helps keep swelling down.  · Talk with your healthcare provider if you are in pain. Ask what kind of over-the-counter medicine you can take for pain.  · Apply clean bandages as advised.  · Take your temperature once a day for a week.  · Wash your hands often to prevent spreading the infection.  In the future, wash your hands before and after you touch cuts, scratches, or bandages. This will help prevent infection.   When to call your healthcare provider  Call your healthcare provider immediately if you have any of the following:  · Difficulty or pain when moving the joints above or below the infected area  · Discharge or pus draining from the area  · Fever of 100.4°F (38°C) or higher, or as directed by your healthcare provider  · Pain that gets worse in or around the infected   · Redness that gets worse in or around the infected area, particularly if the area of redness expands to a wider area  · Shaking chills  · Swelling of the infected area  · Vomiting   Date Last Reviewed:  8/1/2016  © 3453-8010 [employer]. [address] [place] [address], [place] [address]. All rights reserved. This information is not intended as a substitute for professional medical care. Always follow your healthcare professional's instructions.

## 2018-05-29 NOTE — PROGRESS NOTES
Subjective:        Patient ID: Billy Rivera is a 52 y.o. male.    Chief Complaint: Blister (right foot)    HPI   Billy Rivera presents with c/o blister and infection of the right foot.  Pt states the redness and infection started at the bottom of the foot ~3 days ago.  Since then the redness, swelling and pain have spread to include the entire right foot up to the ankle.  He cannot bear weight on the forefoot due to pain.  There is a small puncture wound where the infection started from stepping on glass ~1 month ago.  He was able to drain some pus at home from that wound.    Pt reports after stepping on glass 1 month ago he treated the wound with peroxide and topical antibiotic cream.  He says his skin returned to normal after that and before the current sx started.  He denies any further injury to his foot.    Review of Systems    Denies fever      Objective:        Vitals:    05/29/18 1025   BP: 114/78   Resp: 20   Temp: 98.1 °F (36.7 °C)     Physical Exam   Constitutional: He is oriented to person, place, and time. He appears well-developed and well-nourished.   Musculoskeletal:   Antalgic gait   Neurological: He is alert and oriented to person, place, and time.   Skin:   RLE: 1cm clean puncture wound of distal plantar foot, minimal serosanguinous fluid expressed, tenderness to palpation around wound, 1+ edema of R foot up to ankle, diffuse erythema of R foot tracking up to mid tibial region   Vitals reviewed.          Assessment:         1. Cellulitis of right foot    2. Type 2 diabetes mellitus with diabetic peripheral angiopathy without gangrene, with long-term current use of insulin              Plan:         Billy was seen today for blister.    Diagnoses and all orders for this visit:    Cellulitis of right foot: Given uncontrolled DM, start clinda and cipro to cover MRSA, anaerobes, gram negatives.  Keep skin clean and dry.  Soak in warm water to encourage any further drainage from wound.   Follow up in 3 days to re evaluate.  -     clindamycin (CLEOCIN) 300 MG capsule; Take 1 capsule (300 mg total) by mouth every 6 (six) hours.  -     ciprofloxacin HCl (CIPRO) 500 MG tablet; Take 1 tablet (500 mg total) by mouth every 12 (twelve) hours.    Type 2 diabetes mellitus with diabetic peripheral angiopathy without gangrene, with long-term current use of insulin          Follow-up in about 3 days (around 6/1/2018) for foot infection.    Patient Instructions       Discharge Instructions for Cellulitis  You have been diagnosed with cellulitis. This is an infection in the deepest layer of the skin. In some cases, the infection also affects the muscle. Cellulitis is caused by bacteria. The bacteria can enter the body through broken skin. This can happen with a cut, scratch, animal bite, or an insect bite that has been scratched. You may have been treated in the hospital with antibiotics and fluids. You will likely be given a prescription for antibiotics to take at home. This sheet will help you take care of yourself at home.  Home care  When you are home:  · Take the prescribed antibiotic medicine you are given as directed until it is gone. Take it even if you feel better. It treats the infection and stops it from returning. Not taking all the medicine can make future infections hard to treat.  · Keep the infected area clean.  · When possible, raise the infected area above the level of your heart. This helps keep swelling down.  · Talk with your healthcare provider if you are in pain. Ask what kind of over-the-counter medicine you can take for pain.  · Apply clean bandages as advised.  · Take your temperature once a day for a week.  · Wash your hands often to prevent spreading the infection.  In the future, wash your hands before and after you touch cuts, scratches, or bandages. This will help prevent infection.   When to call your healthcare provider  Call your healthcare provider immediately if you have any of  the following:  · Difficulty or pain when moving the joints above or below the infected area  · Discharge or pus draining from the area  · Fever of 100.4°F (38°C) or higher, or as directed by your healthcare provider  · Pain that gets worse in or around the infected   · Redness that gets worse in or around the infected area, particularly if the area of redness expands to a wider area  · Shaking chills  · Swelling of the infected area  · Vomiting   Date Last Reviewed: 8/1/2016 © 2000-2017 [employer]. [address] [place], [place] [id number]. All rights reserved. This information is not intended as a substitute for professional medical care. Always follow your healthcare professional's instructions.

## 2018-06-01 ENCOUNTER — ANESTHESIA EVENT (OUTPATIENT)
Dept: SURGERY | Facility: HOSPITAL | Age: 53
DRG: 982 | End: 2018-06-01
Payer: COMMERCIAL

## 2018-06-01 ENCOUNTER — HOSPITAL ENCOUNTER (INPATIENT)
Facility: HOSPITAL | Age: 53
LOS: 14 days | Discharge: NURSING, REHAB OR LONG-TERM CARE | DRG: 982 | End: 2018-06-15
Attending: FAMILY MEDICINE | Admitting: HOSPITALIST
Payer: OTHER MISCELLANEOUS

## 2018-06-01 ENCOUNTER — OFFICE VISIT (OUTPATIENT)
Dept: FAMILY MEDICINE | Facility: CLINIC | Age: 53
End: 2018-06-01
Payer: COMMERCIAL

## 2018-06-01 VITALS — TEMPERATURE: 99 F | RESPIRATION RATE: 20 BRPM | DIASTOLIC BLOOD PRESSURE: 86 MMHG | SYSTOLIC BLOOD PRESSURE: 132 MMHG

## 2018-06-01 DIAGNOSIS — L97.419 DIABETIC ULCER OF RIGHT MIDFOOT: ICD-10-CM

## 2018-06-01 DIAGNOSIS — L97.501 DIABETIC ULCER OF FOOT ASSOCIATED WITH DIABETES MELLITUS DUE TO UNDERLYING CONDITION, LIMITED TO BREAKDOWN OF SKIN: ICD-10-CM

## 2018-06-01 DIAGNOSIS — S90.851A FOREIGN BODY IN RIGHT FOOT, INITIAL ENCOUNTER: ICD-10-CM

## 2018-06-01 DIAGNOSIS — L02.619 CELLULITIS AND ABSCESS OF FOOT: Primary | ICD-10-CM

## 2018-06-01 DIAGNOSIS — E08.621 DIABETIC ULCER OF FOOT ASSOCIATED WITH DIABETES MELLITUS DUE TO UNDERLYING CONDITION, LIMITED TO BREAKDOWN OF SKIN: ICD-10-CM

## 2018-06-01 DIAGNOSIS — L97.501 NON-PRESSURE CHRONIC ULCER OF OTHER PART OF UNSPECIFIED FOOT LIMITED TO BREAKDOWN OF SKIN: ICD-10-CM

## 2018-06-01 DIAGNOSIS — Z79.4 TYPE 2 DIABETES MELLITUS WITH HYPERGLYCEMIA, WITH LONG-TERM CURRENT USE OF INSULIN: ICD-10-CM

## 2018-06-01 DIAGNOSIS — L97.511 DIABETIC ULCER OF RIGHT FOOT ASSOCIATED WITH DIABETES MELLITUS DUE TO UNDERLYING CONDITION, LIMITED TO BREAKDOWN OF SKIN, UNSPECIFIED PART OF FOOT: ICD-10-CM

## 2018-06-01 DIAGNOSIS — S90.851D FOREIGN BODY IN RIGHT FOOT, SUBSEQUENT ENCOUNTER: ICD-10-CM

## 2018-06-01 DIAGNOSIS — E11.65 TYPE 2 DIABETES MELLITUS WITH HYPERGLYCEMIA, WITH LONG-TERM CURRENT USE OF INSULIN: ICD-10-CM

## 2018-06-01 DIAGNOSIS — L08.9 RIGHT FOOT INFECTION: ICD-10-CM

## 2018-06-01 DIAGNOSIS — E11.621 DIABETIC ULCER OF RIGHT MIDFOOT ASSOCIATED WITH TYPE 2 DIABETES MELLITUS, WITH OTHER ULCER SEVERITY: ICD-10-CM

## 2018-06-01 DIAGNOSIS — L97.419 DIABETIC ULCER OF RIGHT MIDFOOT ASSOCIATED WITH DIABETES MELLITUS OF OTHER TYPE, UNSPECIFIED ULCER STAGE: Primary | ICD-10-CM

## 2018-06-01 DIAGNOSIS — E13.621 DIABETIC ULCER OF RIGHT MIDFOOT ASSOCIATED WITH DIABETES MELLITUS OF OTHER TYPE, UNSPECIFIED ULCER STAGE: Primary | ICD-10-CM

## 2018-06-01 DIAGNOSIS — E11.621 DIABETIC ULCER OF RIGHT MIDFOOT: ICD-10-CM

## 2018-06-01 DIAGNOSIS — E08.621 DIABETIC ULCER OF RIGHT FOOT ASSOCIATED WITH DIABETES MELLITUS DUE TO UNDERLYING CONDITION, LIMITED TO BREAKDOWN OF SKIN, UNSPECIFIED PART OF FOOT: ICD-10-CM

## 2018-06-01 DIAGNOSIS — R05.9 COUGH: ICD-10-CM

## 2018-06-01 DIAGNOSIS — L97.418 DIABETIC ULCER OF RIGHT MIDFOOT ASSOCIATED WITH TYPE 2 DIABETES MELLITUS, WITH OTHER ULCER SEVERITY: ICD-10-CM

## 2018-06-01 DIAGNOSIS — L03.119 CELLULITIS AND ABSCESS OF FOOT: Primary | ICD-10-CM

## 2018-06-01 PROBLEM — D72.829 LEUKOCYTOSIS: Status: ACTIVE | Noted: 2018-06-01

## 2018-06-01 PROBLEM — R19.7 DIARRHEA: Status: ACTIVE | Noted: 2018-06-01

## 2018-06-01 PROBLEM — R11.10 VOMITING: Status: ACTIVE | Noted: 2018-06-01

## 2018-06-01 LAB
ALBUMIN SERPL BCP-MCNC: 3 G/DL
ALP SERPL-CCNC: 117 U/L
ALT SERPL W/O P-5'-P-CCNC: 21 U/L
ANION GAP SERPL CALC-SCNC: 12 MMOL/L
AST SERPL-CCNC: 16 U/L
BASOPHILS # BLD AUTO: 0.04 K/UL
BASOPHILS NFR BLD: 0.3 %
BILIRUB SERPL-MCNC: 1.5 MG/DL
BUN SERPL-MCNC: 19 MG/DL
BUN SERPL-MCNC: 19 MG/DL (ref 6–30)
CALCIUM SERPL-MCNC: 10 MG/DL
CHLORIDE SERPL-SCNC: 93 MMOL/L (ref 95–110)
CHLORIDE SERPL-SCNC: 95 MMOL/L
CO2 SERPL-SCNC: 24 MMOL/L
CREAT SERPL-MCNC: 0.8 MG/DL (ref 0.5–1.4)
CREAT SERPL-MCNC: 1.1 MG/DL
CRP SERPL-MCNC: 217 MG/L
DIFFERENTIAL METHOD: ABNORMAL
EOSINOPHIL # BLD AUTO: 0 K/UL
EOSINOPHIL NFR BLD: 0.3 %
ERYTHROCYTE [DISTWIDTH] IN BLOOD BY AUTOMATED COUNT: 12.1 %
ERYTHROCYTE [SEDIMENTATION RATE] IN BLOOD BY WESTERGREN METHOD: 110 MM/HR
EST. GFR  (AFRICAN AMERICAN): >60 ML/MIN/1.73 M^2
EST. GFR  (NON AFRICAN AMERICAN): >60 ML/MIN/1.73 M^2
GLUCOSE SERPL-MCNC: 303 MG/DL (ref 70–110)
GLUCOSE SERPL-MCNC: 311 MG/DL
HCT VFR BLD AUTO: 41.9 %
HCT VFR BLD CALC: 44 %PCV (ref 36–54)
HGB BLD-MCNC: 14.4 G/DL
IMM GRANULOCYTES # BLD AUTO: 0.12 K/UL
IMM GRANULOCYTES NFR BLD AUTO: 0.8 %
LACTATE SERPL-SCNC: 1.3 MMOL/L
LYMPHOCYTES # BLD AUTO: 1 K/UL
LYMPHOCYTES NFR BLD: 6.5 %
MCH RBC QN AUTO: 29.4 PG
MCHC RBC AUTO-ENTMCNC: 34.4 G/DL
MCV RBC AUTO: 86 FL
MONOCYTES # BLD AUTO: 1.8 K/UL
MONOCYTES NFR BLD: 11.2 %
NEUTROPHILS # BLD AUTO: 12.7 K/UL
NEUTROPHILS NFR BLD: 80.9 %
NRBC BLD-RTO: 0 /100 WBC
PLATELET # BLD AUTO: 263 K/UL
PMV BLD AUTO: 10.3 FL
POC IONIZED CALCIUM: 1.12 MMOL/L (ref 1.06–1.42)
POC TCO2 (MEASURED): 25 MMOL/L (ref 23–29)
POCT GLUCOSE: 252 MG/DL (ref 70–110)
POCT GLUCOSE: 294 MG/DL (ref 70–110)
POTASSIUM BLD-SCNC: 4 MMOL/L (ref 3.5–5.1)
POTASSIUM SERPL-SCNC: 4.1 MMOL/L
PROT SERPL-MCNC: 8 G/DL
RBC # BLD AUTO: 4.89 M/UL
SAMPLE: ABNORMAL
SODIUM BLD-SCNC: 132 MMOL/L (ref 136–145)
SODIUM SERPL-SCNC: 131 MMOL/L
WBC # BLD AUTO: 15.66 K/UL

## 2018-06-01 PROCEDURE — 85025 COMPLETE CBC W/AUTO DIFF WBC: CPT

## 2018-06-01 PROCEDURE — 25000003 PHARM REV CODE 250: Performed by: PHYSICIAN ASSISTANT

## 2018-06-01 PROCEDURE — 85651 RBC SED RATE NONAUTOMATED: CPT

## 2018-06-01 PROCEDURE — 87040 BLOOD CULTURE FOR BACTERIA: CPT

## 2018-06-01 PROCEDURE — 99999 PR PBB SHADOW E&M-EST. PATIENT-LVL III: CPT | Mod: PBBFAC,,, | Performed by: INTERNAL MEDICINE

## 2018-06-01 PROCEDURE — 96374 THER/PROPH/DIAG INJ IV PUSH: CPT

## 2018-06-01 PROCEDURE — 25500020 PHARM REV CODE 255: Performed by: HOSPITALIST

## 2018-06-01 PROCEDURE — 83605 ASSAY OF LACTIC ACID: CPT

## 2018-06-01 PROCEDURE — S0073 INJECTION, AZTREONAM, 500 MG: HCPCS | Performed by: PHYSICIAN ASSISTANT

## 2018-06-01 PROCEDURE — 63600175 PHARM REV CODE 636 W HCPCS: Performed by: PHYSICIAN ASSISTANT

## 2018-06-01 PROCEDURE — 96375 TX/PRO/DX INJ NEW DRUG ADDON: CPT

## 2018-06-01 PROCEDURE — A9585 GADOBUTROL INJECTION: HCPCS | Performed by: HOSPITALIST

## 2018-06-01 PROCEDURE — 63600175 PHARM REV CODE 636 W HCPCS: Mod: JG | Performed by: STUDENT IN AN ORGANIZED HEALTH CARE EDUCATION/TRAINING PROGRAM

## 2018-06-01 PROCEDURE — S0077 INJECTION, CLINDAMYCIN PHOSP: HCPCS | Performed by: PHYSICIAN ASSISTANT

## 2018-06-01 PROCEDURE — 11000001 HC ACUTE MED/SURG PRIVATE ROOM

## 2018-06-01 PROCEDURE — 99214 OFFICE O/P EST MOD 30 MIN: CPT | Mod: S$GLB,,, | Performed by: INTERNAL MEDICINE

## 2018-06-01 PROCEDURE — 82962 GLUCOSE BLOOD TEST: CPT

## 2018-06-01 PROCEDURE — 99285 EMERGENCY DEPT VISIT HI MDM: CPT | Mod: ,,, | Performed by: PHYSICIAN ASSISTANT

## 2018-06-01 PROCEDURE — 80053 COMPREHEN METABOLIC PANEL: CPT

## 2018-06-01 PROCEDURE — S0073 INJECTION, AZTREONAM, 500 MG: HCPCS | Performed by: STUDENT IN AN ORGANIZED HEALTH CARE EDUCATION/TRAINING PROGRAM

## 2018-06-01 PROCEDURE — 99223 1ST HOSP IP/OBS HIGH 75: CPT | Mod: ,,, | Performed by: HOSPITALIST

## 2018-06-01 PROCEDURE — 25000003 PHARM REV CODE 250: Performed by: STUDENT IN AN ORGANIZED HEALTH CARE EDUCATION/TRAINING PROGRAM

## 2018-06-01 PROCEDURE — 86140 C-REACTIVE PROTEIN: CPT

## 2018-06-01 PROCEDURE — 99285 EMERGENCY DEPT VISIT HI MDM: CPT | Mod: 25

## 2018-06-01 PROCEDURE — 63600175 PHARM REV CODE 636 W HCPCS: Performed by: STUDENT IN AN ORGANIZED HEALTH CARE EDUCATION/TRAINING PROGRAM

## 2018-06-01 RX ORDER — GADOBUTROL 604.72 MG/ML
10 INJECTION INTRAVENOUS
Status: COMPLETED | OUTPATIENT
Start: 2018-06-01 | End: 2018-06-01

## 2018-06-01 RX ORDER — CARVEDILOL 12.5 MG/1
12.5 TABLET ORAL 2 TIMES DAILY
Status: DISCONTINUED | OUTPATIENT
Start: 2018-06-01 | End: 2018-06-15 | Stop reason: HOSPADM

## 2018-06-01 RX ORDER — ACETAMINOPHEN 325 MG/1
650 TABLET ORAL EVERY 4 HOURS PRN
Status: DISCONTINUED | OUTPATIENT
Start: 2018-06-01 | End: 2018-06-15 | Stop reason: HOSPADM

## 2018-06-01 RX ORDER — METRONIDAZOLE 500 MG/1
500 TABLET ORAL EVERY 8 HOURS
Status: DISCONTINUED | OUTPATIENT
Start: 2018-06-01 | End: 2018-06-03

## 2018-06-01 RX ORDER — AZTREONAM 2 G/1
2000 INJECTION, POWDER, LYOPHILIZED, FOR SOLUTION INTRAMUSCULAR; INTRAVENOUS
Status: COMPLETED | OUTPATIENT
Start: 2018-06-01 | End: 2018-06-01

## 2018-06-01 RX ORDER — INSULIN ASPART 100 [IU]/ML
1-10 INJECTION, SOLUTION INTRAVENOUS; SUBCUTANEOUS
Status: DISCONTINUED | OUTPATIENT
Start: 2018-06-01 | End: 2018-06-15 | Stop reason: HOSPADM

## 2018-06-01 RX ORDER — ATORVASTATIN CALCIUM 20 MG/1
80 TABLET, FILM COATED ORAL DAILY
Status: DISCONTINUED | OUTPATIENT
Start: 2018-06-02 | End: 2018-06-15 | Stop reason: HOSPADM

## 2018-06-01 RX ORDER — INSULIN ASPART 100 [IU]/ML
15 INJECTION, SOLUTION INTRAVENOUS; SUBCUTANEOUS
Status: DISCONTINUED | OUTPATIENT
Start: 2018-06-01 | End: 2018-06-07

## 2018-06-01 RX ORDER — ENOXAPARIN SODIUM 100 MG/ML
40 INJECTION SUBCUTANEOUS EVERY 24 HOURS
Status: DISCONTINUED | OUTPATIENT
Start: 2018-06-01 | End: 2018-06-04

## 2018-06-01 RX ORDER — IBUPROFEN 200 MG
16 TABLET ORAL
Status: DISCONTINUED | OUTPATIENT
Start: 2018-06-01 | End: 2018-06-15 | Stop reason: HOSPADM

## 2018-06-01 RX ORDER — CLINDAMYCIN PHOSPHATE 900 MG/50ML
900 INJECTION, SOLUTION INTRAVENOUS
Status: COMPLETED | OUTPATIENT
Start: 2018-06-01 | End: 2018-06-01

## 2018-06-01 RX ORDER — AZTREONAM 2 G/1
2000 INJECTION, POWDER, LYOPHILIZED, FOR SOLUTION INTRAMUSCULAR; INTRAVENOUS
Status: DISCONTINUED | OUTPATIENT
Start: 2018-06-01 | End: 2018-06-03

## 2018-06-01 RX ORDER — LISINOPRIL 20 MG/1
40 TABLET ORAL DAILY
Status: DISCONTINUED | OUTPATIENT
Start: 2018-06-02 | End: 2018-06-15 | Stop reason: HOSPADM

## 2018-06-01 RX ORDER — IBUPROFEN 200 MG
24 TABLET ORAL
Status: DISCONTINUED | OUTPATIENT
Start: 2018-06-01 | End: 2018-06-15 | Stop reason: HOSPADM

## 2018-06-01 RX ORDER — ONDANSETRON 4 MG/1
8 TABLET, ORALLY DISINTEGRATING ORAL EVERY 8 HOURS PRN
Status: DISCONTINUED | OUTPATIENT
Start: 2018-06-01 | End: 2018-06-15 | Stop reason: HOSPADM

## 2018-06-01 RX ORDER — GLUCAGON 1 MG
1 KIT INJECTION
Status: DISCONTINUED | OUTPATIENT
Start: 2018-06-01 | End: 2018-06-15 | Stop reason: HOSPADM

## 2018-06-01 RX ORDER — SODIUM CHLORIDE 0.9 % (FLUSH) 0.9 %
5 SYRINGE (ML) INJECTION
Status: DISCONTINUED | OUTPATIENT
Start: 2018-06-01 | End: 2018-06-15 | Stop reason: HOSPADM

## 2018-06-01 RX ADMIN — AZTREONAM 2000 MG: 2 INJECTION, POWDER, LYOPHILIZED, FOR SOLUTION INTRAMUSCULAR; INTRAVENOUS at 04:06

## 2018-06-01 RX ADMIN — METRONIDAZOLE 500 MG: 500 TABLET ORAL at 10:06

## 2018-06-01 RX ADMIN — GADOBUTROL 10 ML: 604.72 INJECTION INTRAVENOUS at 09:06

## 2018-06-01 RX ADMIN — INSULIN DETEMIR 45 UNITS: 100 INJECTION, SOLUTION SUBCUTANEOUS at 10:06

## 2018-06-01 RX ADMIN — ONDANSETRON 8 MG: 4 TABLET, ORALLY DISINTEGRATING ORAL at 08:06

## 2018-06-01 RX ADMIN — CLINDAMYCIN IN 5 PERCENT DEXTROSE 900 MG: 18 INJECTION, SOLUTION INTRAVENOUS at 04:06

## 2018-06-01 RX ADMIN — ACETAMINOPHEN 650 MG: 325 TABLET ORAL at 10:06

## 2018-06-01 RX ADMIN — INSULIN ASPART 3 UNITS: 100 INJECTION, SOLUTION INTRAVENOUS; SUBCUTANEOUS at 10:06

## 2018-06-01 RX ADMIN — DAPTOMYCIN 735 MG: 500 INJECTION, POWDER, LYOPHILIZED, FOR SOLUTION INTRAVENOUS at 11:06

## 2018-06-01 RX ADMIN — SODIUM CHLORIDE 1000 ML: 0.9 INJECTION, SOLUTION INTRAVENOUS at 08:06

## 2018-06-01 RX ADMIN — AZTREONAM 2000 MG: 2 INJECTION, POWDER, LYOPHILIZED, FOR SOLUTION INTRAMUSCULAR; INTRAVENOUS at 09:06

## 2018-06-01 NOTE — ASSESSMENT & PLAN NOTE
Cellulitis  Foreign object (glass) in foot  - Suspect: R foot ulcer, cellulitis, possible osteomyelitis due to stepping on glass  - Lab/rad/micro: FU BCx, WBC ct  - Consult: Podiatry- pt has glass in foot via XR- will follow up removal surgery and deep cultures  - Treatment: Aztreonam, flagyl, daptomycin  - Status: currently patient is stable, continue to monitor

## 2018-06-01 NOTE — ASSESSMENT & PLAN NOTE
-Patient with uncontrolled diabetes despite taking multiple medications at home with peripheral neuropathy  - Patient has 's on admit, anion gap 14  - Start treating with 80% home meds- 45 BB and 15 TID with meals. (home meds- 55u basal bolus, 20 prandial TIDWM, metformin, trulicity, glargine) will increase as needed  - Continue to monitor

## 2018-06-01 NOTE — HPI
Patient is a 52-year-old male with HTN, MI 2013 s/p 1 stent, DMT2, colon cancer s/p resection 2017 who presents after failing outpatient treatment for a right ulcer.  Patient states that he stepped on a piece of glass at work where he is  on the 19th of May and it went through his shoe. At that time he removed the glass, used antiseptic and antibacterial on his foot and felt that things are going OK. He didn't have any issues until last Saturday The 26th, when he noted that a puffy cherry blister was occurring on his plantar surface near the first digit. On Tuesday, the 29th he presented to his primary care physician in [place] where he was started on clinda and cipro. Patient notes that the clinda and Cipro gave him diarrhea and vomiting. He stated that he had diarrhea three times a day with some form, mostly liquid, no blood and it has been the same since it started. His vomiting occurred five times yesterday, and was brownish with food. His last diarrhea episode was at 10:30 this morning, and vomiting at 4:30 in the afternoon. The patient presented to his outside PCP today, without relief of symptoms on antibiotics. Patient denies fever, chills, nausea, vomiting, diarrhea, dysuria, rash, shortness of breath, chest pain, abdominal pain.  Patient states that his diabetes started 15 to 20 years ago, and has been reasonably well controlled since then with no neuropathy, optic issues or kidney issues. Patient notes that he had a stent in 2013 for coronary artery disease, and colon cancer that was resected in April 2017 without any issues on follow up.

## 2018-06-01 NOTE — SUBJECTIVE & OBJECTIVE
Past Medical History:   Diagnosis Date    Allergy     CAD (coronary artery disease), native coronary artery 6/25/2013    Cancer     Diabetes mellitus     Diabetes mellitus, type 2     Disorder of kidney and ureter     Heart attack 04/2012    Hyperlipidemia     Hypertension     Muscular pain     post-op after colonoscopy       Past Surgical History:   Procedure Laterality Date    COLONOSCOPY N/A 2/17/2017    Procedure: COLONOSCOPY;  Surgeon: Simon Gomez MD;  Location: [address]);  Service: Endoscopy;  Laterality: N/A;    CORONARY ANGIOPLASTY      TONSILLECTOMY         Review of patient's allergies indicates:   Allergen Reactions    Penicillins Anaphylaxis    Shellfish containing products      Other reaction(s): Unknown    Vancomycin Itching    Bactrim  [sulfamethoxazole-trimethoprim] Rash       No current facility-administered medications on file prior to encounter.      Current Outpatient Prescriptions on File Prior to Encounter   Medication Sig    aspirin 81 MG Chew Take 81 mg by mouth every evening. swallows    atorvastatin (LIPITOR) 80 MG tablet Take 1 tablet (80 mg total) by mouth once daily.    blood sugar diagnostic Strp Strips and lancets    Use before meals and bedtime    carvedilol (COREG) 12.5 MG tablet Take 1 tablet (12.5 mg total) by mouth 2 (two) times daily.    ciprofloxacin HCl (CIPRO) 500 MG tablet Take 1 tablet (500 mg total) by mouth every 12 (twelve) hours.    clindamycin (CLEOCIN) 300 MG capsule Take 1 capsule (300 mg total) by mouth every 6 (six) hours.    dulaglutide (TRULICITY) 1.5 mg/0.5 mL PnIj Inject 1.5 mg into the skin every 7 days.    ibuprofen (ADVIL,MOTRIN) 800 MG tablet Take 1 tablet (800 mg total) by mouth 3 (three) times daily as needed for Pain.    insulin glargine (LANTUS SOLOSTAR) 100 unit/mL (3 mL) InPn pen INJECT 55 UNITS SUBCUTANEOUSLY IN THE EVENING    insulin lispro (HUMALOG) 100 unit/mL injection Inject 20 Units into the skin 3 (three)  "times daily before meals.    lisinopril (PRINIVIL,ZESTRIL) 40 MG tablet Take 1 tablet (40 mg total) by mouth once daily.    metFORMIN (GLUCOPHAGE) 1000 MG tablet Take 1 tablet (1,000 mg total) by mouth 2 (two) times daily with meals.    nitroGLYCERIN (NITROSTAT) 0.4 MG SL tablet Place 1 tablet (0.4 mg total) under the tongue every 5 (five) minutes as needed for Chest pain.    pen needle, diabetic 31 gauge x 3/16" Ndle Inject 1 each into the skin 3 (three) times daily before meals.     Family History     Problem Relation (Age of Onset)    Heart disease Mother, Brother        Social History Main Topics    Smoking status: Never Smoker    Smokeless tobacco: Never Used    Alcohol use Yes      Comment: rare x1 beer-     Drug use: No    Sexual activity: Not Currently     Review of Systems   Constitutional: Negative for chills, fever and unexpected weight change.   HENT: Negative for hearing loss.    Eyes: Negative for visual disturbance.   Respiratory: Negative for shortness of breath.    Cardiovascular: Negative for chest pain and leg swelling.   Gastrointestinal: Negative for abdominal pain, constipation, diarrhea, nausea and vomiting.   Genitourinary: Negative for dysuria and hematuria.   Musculoskeletal: Negative for arthralgias.   Skin: Positive for color change, rash and wound.   Neurological: Negative for seizures and weakness.   Hematological: Negative for adenopathy. Does not bruise/bleed easily.     Objective:     Vital Signs (Most Recent):  Temp: 99.2 °F (37.3 °C) (06/01/18 1259)  Pulse: 103 (06/01/18 1259)  Resp: 20 (06/01/18 1259)  BP: 137/75 (06/01/18 1259)  SpO2: 96 % (06/01/18 1259) Vital Signs (24h Range):  Temp:  [99 °F (37.2 °C)-99.2 °F (37.3 °C)] 99.2 °F (37.3 °C)  Pulse:  [103] 103  Resp:  [20] 20  SpO2:  [96 %] 96 %  BP: (132-137)/(75-86) 137/75     Weight: 122.5 kg (270 lb)  Body mass index is 35.62 kg/m².    Physical Exam   Constitutional: He is oriented to person, place, and time. He " appears well-developed and well-nourished. No distress.   HENT:   Head: Normocephalic and atraumatic.   Eyes: EOM are normal. Pupils are equal, round, and reactive to light.   Neck: No tracheal deviation present. No thyromegaly present.   Cardiovascular: Normal rate and regular rhythm.    No murmur heard.  Pulmonary/Chest: Effort normal and breath sounds normal. He has no wheezes. He has no rales.   Abdominal: Soft. He exhibits no mass. There is no tenderness. No hernia.   Musculoskeletal: He exhibits edema and tenderness.   Neurological: He is alert and oriented to person, place, and time. No cranial nerve deficit. He exhibits normal muscle tone.   Skin: Skin is warm. Rash noted. He is not diaphoretic. There is erythema.   Psychiatric: He has a normal mood and affect.   Vitals reviewed.        CRANIAL NERVES     CN III, IV, VI   Pupils are equal, round, and reactive to light.  Extraocular motions are normal.        Significant Labs:   CBC:   Recent Labs  Lab 06/01/18  1445 06/01/18  1506   WBC 15.66*  --    HGB 14.4  --    HCT 41.9 44     --      CMP: No results for input(s): NA, K, CL, CO2, GLU, BUN, CREATININE, CALCIUM, PROT, ALBUMIN, BILITOT, ALKPHOS, AST, ALT, ANIONGAP, EGFRNONAA in the last 48 hours.    Invalid input(s): ESTGFAFRICA    Significant Imaging: I have reviewed all pertinent imaging results/findings within the past 24 hours.   Imaging Results          X-Ray Foot Complete Right (Final result)  Result time 06/01/18 15:37:38    Final result by Abel Wise III, MD (06/01/18 15:37:38)                 Impression:      See above      Electronically signed by: Abel Wise MD  Date:    06/01/2018  Time:    15:37             Narrative:    EXAMINATION:  XR FOOT COMPLETE 3 VIEW RIGHT    FINDINGS:  There is DJD and spurs on the calcaneus.  There is a foreign body soft tissues below in between the 1st and 2nd metatarsals thought to be a shard of glass.  There may be soft tissue swelling.  No  fracture dislocation bone destruction seen.                               X-Ray Tibia Fibula 2 View Right (Final result)  Result time 06/01/18 15:37:58    Final result by Abel Wise III, MD (06/01/18 15:37:58)                 Impression:      See above      Electronically signed by: Abel Wise MD  Date:    06/01/2018  Time:    15:37             Narrative:    EXAMINATION:  XR TIBIA FIBULA 2 VIEW RIGHT    FINDINGS:  No fracture dislocation bone destruction seen.  There are spurs on the patella and tibial tuberosity.                               X-Ray Ankle Complete Right (Final result)  Result time 06/01/18 15:38:12    Final result by Abel Wise III, MD (06/01/18 15:38:12)                 Impression:      See above      Electronically signed by: Abel Wise MD  Date:    06/01/2018  Time:    15:38             Narrative:    EXAMINATION:  XR ANKLE COMPLETE 3 VIEW RIGHT    FINDINGS:  There is DJD and spurs on the calcaneus.  No fracture dislocation bone destruction seen.

## 2018-06-01 NOTE — PROGRESS NOTES
Subjective:        Patient ID: Billy Rivera is a 52 y.o. male.    Chief Complaint: right foot, infection, walks with a limp    HPI   Billy Rivera presents for follow up of cellulitis of the R foot.  Pt seen 3 days ago and started on cipro and clinda for diabetic foot infection.  Pt has had N/V, diarrhea 2/2 abx.  He has not eaten in 2 days and sometimes cannot keep water down.  Pt reports the infection has not gotten better or worse.  He is walking with a limp due to pain.    Review of Systems  as per HPI      Objective:        Vitals:    06/01/18 1110   BP: 132/86   Resp: 20   Temp: 99 °F (37.2 °C)     Physical Exam   Constitutional: He is oriented to person, place, and time. He appears well-developed and well-nourished. No distress.   Musculoskeletal:   R foot, ankle diffusely erythematous, edematous, tender and with abscess of plantar foot   Neurological: He is alert and oriented to person, place, and time.   Vitals reviewed.          Assessment:         1. Cellulitis and abscess of foot              Plan:         Billy was seen today for right foot, infection, walks with a limp.    Diagnoses and all orders for this visit:    Cellulitis and abscess of foot: Infection is getting worse and pt is not tolerating PO abx at this time.  Recommend pt go to the ER for further evaluation, IV abx, drainage of abscess, and wound care.  -     Refer to Emergency Dept.      Follow up after ER/hospital evaluation.

## 2018-06-01 NOTE — ED PROVIDER NOTES
Encounter Date: 6/1/2018       History     Chief Complaint   Patient presents with    Foot Pain     right foot pain x 6 days.  Pt went to clinic and was put on antibiotics.  Infection is not improving so patient was sent here for IV antibiotics and foot care.      Patient is a 52-year-old male with past medical history of diabetes, hypertension, hyperlipidemia, and coronary artery disease who presents to the emergency department due to a right diabetic foot ulcer.  Patient states that he stepped on glass 2 weeks ago and began having foot pain on the 19th of May.  Patient states that he need to some erythema on his shin as well as on the anterior aspect of his foot.  Patient states he went to see his PCP on Tuesday June 29 and was prescribed Cipro and clindamycin.  Patient states he has been taking this medication with no relief.  Patient also notes a ft ulcer on the posterior aspect of his foot.  Patient states that he has been walking on the side of his foot to avoid pain. Patient denies any fevers, chills, chest pain, shortness breath, or any other complaints.          Review of patient's allergies indicates:   Allergen Reactions    Penicillins Anaphylaxis    Shellfish containing products      Other reaction(s): Unknown    Vancomycin Itching    Bactrim  [sulfamethoxazole-trimethoprim] Rash     Past Medical History:   Diagnosis Date    Allergy     CAD (coronary artery disease), native coronary artery 6/25/2013    Cancer     Diabetes mellitus     Diabetes mellitus, type 2     Disorder of kidney and ureter     Heart attack 04/2012    Hyperlipidemia     Hypertension     Muscular pain     post-op after colonoscopy     Past Surgical History:   Procedure Laterality Date    COLONOSCOPY N/A 2/17/2017    Procedure: COLONOSCOPY;  Surgeon: Simon Gomez MD;  Location: [address];  Service: Endoscopy;  Laterality: N/A;    CORONARY ANGIOPLASTY      TONSILLECTOMY       Family History   Problem  Relation Age of Onset    Heart disease Mother     Heart disease Brother     Anesthesia problems Neg Hx      Social History   Substance Use Topics    Smoking status: Never Smoker    Smokeless tobacco: Never Used    Alcohol use Yes      Comment: rare x1 beer-      Review of Systems   Constitutional: Negative for activity change, appetite change, diaphoresis, fatigue and fever.   HENT: Negative for congestion, dental problem, drooling, ear pain, facial swelling, sore throat and trouble swallowing.    Eyes: Negative for pain, discharge and visual disturbance.   Respiratory: Negative for apnea, cough, chest tightness and shortness of breath.    Cardiovascular: Negative for chest pain and palpitations.   Gastrointestinal: Negative for abdominal distention, anal bleeding, blood in stool, diarrhea, nausea and vomiting.   Endocrine: Negative for cold intolerance and polydipsia.   Genitourinary: Negative for decreased urine volume, difficulty urinating, enuresis, frequency and hematuria.   Musculoskeletal: Negative for arthralgias, gait problem, myalgias and neck stiffness.   Skin: Negative for color change and pallor.        Redness and swelling to shin and anterior aspect of foot. Ulcer to posterior aspect of foot near his toes.    Allergic/Immunologic: Negative for environmental allergies.   Neurological: Negative for dizziness, syncope, numbness and headaches.   Psychiatric/Behavioral: Negative for agitation, confusion and dysphoric mood.       Physical Exam     Initial Vitals [06/01/18 1259]   BP Pulse Resp Temp SpO2   137/75 103 20 99.2 °F (37.3 °C) 96 %      MAP       95.67         Physical Exam    Nursing note and vitals reviewed.  Constitutional: He appears well-developed and well-nourished. He is not diaphoretic. No distress.   HENT:   Head: Normocephalic and atraumatic.   Neck: Normal range of motion. Neck supple.   Cardiovascular: Normal rate, regular rhythm and normal heart sounds. Exam reveals no gallop  and no friction rub.    No murmur heard.  Pulmonary/Chest: Breath sounds normal. He has no wheezes. He has no rhonchi. He has no rales.   Abdominal: Soft. Bowel sounds are normal. There is no tenderness. There is no rebound and no guarding.   Musculoskeletal: Normal range of motion.   Neurological: He is alert and oriented to person, place, and time.   Skin: Skin is warm and dry. No rash noted. No erythema.   Redness and swelling to shin and anterior aspect of foot. Abscess to posterior aspect of foot   Psychiatric: He has a normal mood and affect.         ED Course   Procedures  Labs Reviewed   ISTAT PROCEDURE - Abnormal; Notable for the following:        Result Value    POC Glucose 303 (*)     POC Sodium 132 (*)     POC Chloride 93 (*)     All other components within normal limits   CULTURE, BLOOD   CBC W/ AUTO DIFFERENTIAL   COMPREHENSIVE METABOLIC PANEL   SEDIMENTATION RATE   C-REACTIVE PROTEIN   LACTIC ACID, PLASMA                   APC / Resident Notes:   Patient is a 52-year-old male with past medical history of diabetes, hypertension, hyperlipidemia, and coronary artery disease who presents to the emergency department due to a right diabetic foot ulcer.  Physical exam reveals female no acute distress.  Heart regular rate and rhythm.  Lungs clear to auscultation bilaterally.  Abdomen soft nontender nondistended.  Erythema noted to right shin as well as anterior aspect right foot worse near the toes.  Abscess noted to posterior area foot.  Patient is allergic to penicillins and has a reaction to vancomycin.  Will place patient on IV aztreonam and clindamycin.  Will obtain lab work and x-rays.  Will admit patient to Hospital Medicine for further workup.  Plan of treatment discussed with attending physician and he is agreeable.                 Clinical Impression:   The primary encounter diagnosis was Diabetic ulcer of right midfoot associated with diabetes mellitus of other type, unspecified ulcer stage. A  diagnosis of Diabetic ulcer of foot associated with diabetes mellitus due to underlying condition, limited to breakdown of skin was also pertinent to this visit.    X-Ray Foot Complete Right    (Results Pending)   X-Ray Tibia Fibula 2 View Right    (Results Pending)   X-Ray Ankle Complete Right    (Results Pending)       Disposition:   Disposition: Admitted  Condition: Stable                        Zunilda Noel PA-C  06/05/18 0846       Zunilda Noel PA-C  06/05/18 0847

## 2018-06-01 NOTE — ASSESSMENT & PLAN NOTE
- Malignant colon polyp removed endoscopically, no residual cancer s/p lap sigmoid colectomy- April 2017

## 2018-06-01 NOTE — ASSESSMENT & PLAN NOTE
- Patient is asymptomatic and stable  - Treating with home meds- coreg, lisinopril  - Continue to monitor

## 2018-06-01 NOTE — ED NOTES
Pt states he stepped on a piece of glass 3 weeks ago and it was healing fine until 5.19.2018 when it became more painful and swelling and became red - has been on antibiotocs x 4 days after a visit to an urgent care center - pt has whitened area to plantar aspect of right foot measuring 7cm x 3cm with redness around that circumferential around entire foot with redness up anterior aspect of leg 20 cm with increase in warmth and redness - swelling also noted to entire foot with no decrease in range of motion - no loss of sensation per patient - cap refill to toes is 3-4 seconds with purple area noted at metatarsals #2-3-4    Denies fevers

## 2018-06-01 NOTE — ASSESSMENT & PLAN NOTE
- Suspect this is due to glass lodged in patient's foot and probable infection, cellulitis, potential osteomyelitis  -See above

## 2018-06-01 NOTE — HOSPITAL COURSE
6/1/2018- admission date. Empirically started on daptomycin due to PCN allergy, aztreonam, flagyl for anaerobe coverage. Diarrhea resolved on admission, thought to be side effect of outpatient abx rather than c diff.  6/2/2018- Received OR debridement by podiatry. Tolerated procedure well. Had fever of 101.9 overnight. WBC persistently ~13. VASILIY 1.14 in LLE. RLE VASILIY unable to be obtained due to wound/infection.  6/3/2018- Aerobic wound culture grew strep agalactiae. Per ID recs, abx switched to cefazolin with no adverse effects. Fever of 101.4. Repeat XR of foot showed residual piece of glass in foot.  06/04/2018: Had not had any BM since admission. Was started on bowel regimen. However, pt start having diarrhea again, 4 episodes during the day, before receiving any bowel regimen. Lactulose and miralax were not given. Pt received 1 dose of senna-docusate overnight. Had 1 more episode of diarrhea overnight. Persistent hyponatremia with Na+ in low 130s. Urine studies showed low-normal urine sodium and normal osmolality. Fever of 101 recurred for 8 hours, resolved with tylenol. Pt denied symptoms during febrile episode. WBC slightly improved, left shift persisted. Thought to be due to residual foreign object embedded in foot.  06/05/2018: Recurrence of diarrhea thought to be secondary to antibiotics, given similar presentation while pt was previously on PO abx prior to admission. Started on probiotics. Stool became formed later in the day. Went to OR again for removal of remaining piece of glass in foot. Found to have purulent drainage from wound, which was debrided. However, glass was found to be too deeply embedded in tissue and was unable to be retrieved despite multiple attempts. Post op XR of foot showed soft tissue swelling and gas. Immediately post op, pt's fever recurred with temp up to 102 for 8 hours overnight.  6/6/2018: reports fever and fatigue. Denies chills, myalgia, cough, sore throat, dysuria, WBC  stable at 13. Denies foot pain or bleeding. Hgb stable. Right second toe noted to be blue/pale on exam. Thought to be bruising from pressure applied on toe in OR during debridement of foot vs ischemia (given unknown RLE VASILIY). Monitored for swelling, per podiatry recs.  06/07/2018: had fever of 101.1 F overnight that resolved with 1 dose of PO tylenol. VS otherwise stable. Pain controlled with oxycodone IR 5 mg x 1. WBC improved to 10. No BM since I/D two days ago. Went to OR again for I/D in attempt to remove residual piece of glass in wound, which successfully removed large piece of glass. Wound cultures were sent. Unfortunately, there was one small piece (<1 mm in size) residual in wound, which was confirmed on XR post op. Underwent BLE arterial angiogram for ischemic evaluation which showed 3 vessel run off.  06/08/2018: remained afebrile. WBC remained normal. Per ID recs, continued on IV abx for 6 week duration for presumed osteomyelitis, given proximity of foreign object/extent of wound to bone. PICC line was placed, confirmed by CXR. No BM since 6/5/18. Started on metamucil.  06/09/2018: remained afebrile with normal WBC. Blood cultures from 6/2/18 and 6/6/18 remained negative. Pain controlled with prn opiates. Started on senna-doc.  06/10/2018: remained afebrile with Tmax 100 F. HR and BP at goal, WBC wnl. Hgb remained stable, no signs of bleeding post op. Pain controlled, independently ambulated from bed to bathroom on heel of R foot, able to bear some weight over site of wound. R foot bandage changed daily. R second toe bruising improving. R leg cellulitis resolving. Had 1 formed stool yesterday and 1 this morning. Refused metamucil and senna-doc. BG in 140s-220s. Insulin regimen was increased.  06/11/2018: remained afebrile with Tm 98.9 F. Pain controlled, did not need any prn opiates for the past 3 days. No signs/sx of bleeding from wound. Resumed on ASA 81 mg daily. Continued to have normal BM without  taking metamucil and senna-doc. BG at lunch time, dinner time, bed time, and overnight improved to 180-200 on higher dose of insulin. Recurrent hypomagnesemia, corrected with IV supplement.   06/12/2018: Remained medically stable. Ambulating independently without significant pain. Having daily BM. Hyperglycemia controlled with increased insulin regimen.  06/13/2018: Remained medically stable. BG up to 210s-280s throughout the day and night. Insulin detemir was increased to 60 units qhs and insulin aspart was increased to 25 units TIDWM.  06/14/2018: BG persistently high. Insulin aspart was increased to 28 units TIDWM.  06/15/2018: BG persistently in mid-high 200s. Insulin aspart was increased to 30 units TIDWM. Due to cost of wound vac and IV abx, pt was unable to be discharged home. Instead, discharged to LTAC with 6 week total course of cefazolin (via ), wound vac, new insulin regimen, probiotics, bowel regimen, PO Mg+2 oxide supplement. Plan to follow up with podiatry, ID, and allergy (for evaluation of penicillin allergy).

## 2018-06-02 ENCOUNTER — ANESTHESIA (OUTPATIENT)
Dept: SURGERY | Facility: HOSPITAL | Age: 53
DRG: 982 | End: 2018-06-02
Payer: COMMERCIAL

## 2018-06-02 ENCOUNTER — SURGERY (OUTPATIENT)
Age: 53
End: 2018-06-02

## 2018-06-02 PROBLEM — R11.10 VOMITING: Status: RESOLVED | Noted: 2018-06-01 | Resolved: 2018-06-02

## 2018-06-02 PROBLEM — R19.7 DIARRHEA: Status: RESOLVED | Noted: 2018-06-01 | Resolved: 2018-06-02

## 2018-06-02 LAB
ALBUMIN SERPL BCP-MCNC: 2.4 G/DL
ALBUMIN SERPL BCP-MCNC: 2.4 G/DL
ALP SERPL-CCNC: 112 U/L
ALP SERPL-CCNC: 96 U/L
ALT SERPL W/O P-5'-P-CCNC: 16 U/L
ALT SERPL W/O P-5'-P-CCNC: 17 U/L
ANION GAP SERPL CALC-SCNC: 10 MMOL/L
ANION GAP SERPL CALC-SCNC: 11 MMOL/L
AST SERPL-CCNC: 12 U/L
AST SERPL-CCNC: 14 U/L
BASOPHILS # BLD AUTO: 0.03 K/UL
BASOPHILS # BLD AUTO: 0.06 K/UL
BASOPHILS NFR BLD: 0.2 %
BASOPHILS NFR BLD: 0.4 %
BILIRUB SERPL-MCNC: 0.8 MG/DL
BILIRUB SERPL-MCNC: 0.8 MG/DL
BUN SERPL-MCNC: 17 MG/DL
BUN SERPL-MCNC: 19 MG/DL
CALCIUM SERPL-MCNC: 8.7 MG/DL
CALCIUM SERPL-MCNC: 8.8 MG/DL
CHLORIDE SERPL-SCNC: 98 MMOL/L
CHLORIDE SERPL-SCNC: 98 MMOL/L
CK SERPL-CCNC: 96 U/L
CK SERPL-CCNC: 98 U/L
CO2 SERPL-SCNC: 22 MMOL/L
CO2 SERPL-SCNC: 24 MMOL/L
CREAT SERPL-MCNC: 1 MG/DL
CREAT SERPL-MCNC: 1.1 MG/DL
DIFFERENTIAL METHOD: ABNORMAL
DIFFERENTIAL METHOD: ABNORMAL
EOSINOPHIL # BLD AUTO: 0.1 K/UL
EOSINOPHIL # BLD AUTO: 0.1 K/UL
EOSINOPHIL NFR BLD: 0.4 %
EOSINOPHIL NFR BLD: 0.6 %
ERYTHROCYTE [DISTWIDTH] IN BLOOD BY AUTOMATED COUNT: 12 %
ERYTHROCYTE [DISTWIDTH] IN BLOOD BY AUTOMATED COUNT: 12.1 %
EST. GFR  (AFRICAN AMERICAN): >60 ML/MIN/1.73 M^2
EST. GFR  (AFRICAN AMERICAN): >60 ML/MIN/1.73 M^2
EST. GFR  (NON AFRICAN AMERICAN): >60 ML/MIN/1.73 M^2
EST. GFR  (NON AFRICAN AMERICAN): >60 ML/MIN/1.73 M^2
ESTIMATED AVG GLUCOSE: 298 MG/DL
GLUCOSE SERPL-MCNC: 239 MG/DL
GLUCOSE SERPL-MCNC: 291 MG/DL
GRAM STN SPEC: NORMAL
HBA1C MFR BLD HPLC: 12 %
HCT VFR BLD AUTO: 36.5 %
HCT VFR BLD AUTO: 38.6 %
HGB BLD-MCNC: 12.4 G/DL
HGB BLD-MCNC: 13.3 G/DL
IMM GRANULOCYTES # BLD AUTO: 0.08 K/UL
IMM GRANULOCYTES # BLD AUTO: 0.11 K/UL
IMM GRANULOCYTES NFR BLD AUTO: 0.6 %
IMM GRANULOCYTES NFR BLD AUTO: 0.8 %
LACTATE SERPL-SCNC: 1 MMOL/L
LYMPHOCYTES # BLD AUTO: 1.1 K/UL
LYMPHOCYTES # BLD AUTO: 1.2 K/UL
LYMPHOCYTES NFR BLD: 7.9 %
LYMPHOCYTES NFR BLD: 8.7 %
MAGNESIUM SERPL-MCNC: 1.5 MG/DL
MAGNESIUM SERPL-MCNC: 1.8 MG/DL
MCH RBC QN AUTO: 29.4 PG
MCH RBC QN AUTO: 29.8 PG
MCHC RBC AUTO-ENTMCNC: 34 G/DL
MCHC RBC AUTO-ENTMCNC: 34.5 G/DL
MCV RBC AUTO: 86 FL
MCV RBC AUTO: 87 FL
MONOCYTES # BLD AUTO: 1.9 K/UL
MONOCYTES # BLD AUTO: 1.9 K/UL
MONOCYTES NFR BLD: 13.5 %
MONOCYTES NFR BLD: 13.8 %
NEUTROPHILS # BLD AUTO: 10.3 K/UL
NEUTROPHILS # BLD AUTO: 10.6 K/UL
NEUTROPHILS NFR BLD: 76.3 %
NEUTROPHILS NFR BLD: 76.8 %
NRBC BLD-RTO: 0 /100 WBC
NRBC BLD-RTO: 0 /100 WBC
PHOSPHATE SERPL-MCNC: 3.5 MG/DL
PHOSPHATE SERPL-MCNC: 3.6 MG/DL
PLATELET # BLD AUTO: 245 K/UL
PLATELET # BLD AUTO: 252 K/UL
PMV BLD AUTO: 9.3 FL
PMV BLD AUTO: 9.7 FL
POCT GLUCOSE: 174 MG/DL (ref 70–110)
POCT GLUCOSE: 192 MG/DL (ref 70–110)
POCT GLUCOSE: 215 MG/DL (ref 70–110)
POCT GLUCOSE: 222 MG/DL (ref 70–110)
POCT GLUCOSE: 224 MG/DL (ref 70–110)
POTASSIUM SERPL-SCNC: 3.9 MMOL/L
POTASSIUM SERPL-SCNC: 4 MMOL/L
PROCALCITONIN SERPL IA-MCNC: 0.29 NG/ML
PROT SERPL-MCNC: 6.3 G/DL
PROT SERPL-MCNC: 6.5 G/DL
RBC # BLD AUTO: 4.22 M/UL
RBC # BLD AUTO: 4.47 M/UL
SODIUM SERPL-SCNC: 131 MMOL/L
SODIUM SERPL-SCNC: 132 MMOL/L
WBC # BLD AUTO: 13.51 K/UL
WBC # BLD AUTO: 13.76 K/UL

## 2018-06-02 PROCEDURE — 36415 COLL VENOUS BLD VENIPUNCTURE: CPT

## 2018-06-02 PROCEDURE — 87116 MYCOBACTERIA CULTURE: CPT | Mod: 59

## 2018-06-02 PROCEDURE — 37000008 HC ANESTHESIA 1ST 15 MINUTES: Performed by: PODIATRIST

## 2018-06-02 PROCEDURE — 85025 COMPLETE CBC W/AUTO DIFF WBC: CPT | Mod: 91

## 2018-06-02 PROCEDURE — 87070 CULTURE OTHR SPECIMN AEROBIC: CPT | Mod: 59

## 2018-06-02 PROCEDURE — 83036 HEMOGLOBIN GLYCOSYLATED A1C: CPT

## 2018-06-02 PROCEDURE — 25000003 PHARM REV CODE 250: Performed by: STUDENT IN AN ORGANIZED HEALTH CARE EDUCATION/TRAINING PROGRAM

## 2018-06-02 PROCEDURE — 87040 BLOOD CULTURE FOR BACTERIA: CPT | Mod: 59

## 2018-06-02 PROCEDURE — 36000704 HC OR TIME LEV I 1ST 15 MIN: Performed by: PODIATRIST

## 2018-06-02 PROCEDURE — 71000039 HC RECOVERY, EACH ADD'L HOUR: Performed by: PODIATRIST

## 2018-06-02 PROCEDURE — 87015 SPECIMEN INFECT AGNT CONCNTJ: CPT | Mod: 59

## 2018-06-02 PROCEDURE — 83735 ASSAY OF MAGNESIUM: CPT

## 2018-06-02 PROCEDURE — 11000001 HC ACUTE MED/SURG PRIVATE ROOM

## 2018-06-02 PROCEDURE — 87075 CULTR BACTERIA EXCEPT BLOOD: CPT

## 2018-06-02 PROCEDURE — D9220A PRA ANESTHESIA: Mod: ,,, | Performed by: ANESTHESIOLOGY

## 2018-06-02 PROCEDURE — S0073 INJECTION, AZTREONAM, 500 MG: HCPCS | Performed by: STUDENT IN AN ORGANIZED HEALTH CARE EDUCATION/TRAINING PROGRAM

## 2018-06-02 PROCEDURE — 20005 PR I&D SOFT TISSUE ABSCESS SUBFASCIAL: CPT | Mod: ,,, | Performed by: PODIATRIST

## 2018-06-02 PROCEDURE — 63600175 PHARM REV CODE 636 W HCPCS: Mod: JG | Performed by: STUDENT IN AN ORGANIZED HEALTH CARE EDUCATION/TRAINING PROGRAM

## 2018-06-02 PROCEDURE — 83605 ASSAY OF LACTIC ACID: CPT

## 2018-06-02 PROCEDURE — 71000033 HC RECOVERY, INTIAL HOUR: Performed by: PODIATRIST

## 2018-06-02 PROCEDURE — 63600175 PHARM REV CODE 636 W HCPCS: Performed by: ANESTHESIOLOGY

## 2018-06-02 PROCEDURE — 25000003 PHARM REV CODE 250: Performed by: PODIATRIST

## 2018-06-02 PROCEDURE — 88342 IMHCHEM/IMCYTCHM 1ST ANTB: CPT | Mod: 26,,, | Performed by: PATHOLOGY

## 2018-06-02 PROCEDURE — 99233 SBSQ HOSP IP/OBS HIGH 50: CPT | Mod: ,,, | Performed by: HOSPITALIST

## 2018-06-02 PROCEDURE — 88305 TISSUE EXAM BY PATHOLOGIST: CPT | Mod: 26,,, | Performed by: PATHOLOGY

## 2018-06-02 PROCEDURE — 36000705 HC OR TIME LEV I EA ADD 15 MIN: Performed by: PODIATRIST

## 2018-06-02 PROCEDURE — S0077 INJECTION, CLINDAMYCIN PHOSP: HCPCS | Performed by: REGISTERED NURSE

## 2018-06-02 PROCEDURE — 63600175 PHARM REV CODE 636 W HCPCS: Performed by: REGISTERED NURSE

## 2018-06-02 PROCEDURE — 88342 IMHCHEM/IMCYTCHM 1ST ANTB: CPT | Performed by: PATHOLOGY

## 2018-06-02 PROCEDURE — 87205 SMEAR GRAM STAIN: CPT

## 2018-06-02 PROCEDURE — 37000009 HC ANESTHESIA EA ADD 15 MINS: Performed by: PODIATRIST

## 2018-06-02 PROCEDURE — 82550 ASSAY OF CK (CPK): CPT

## 2018-06-02 PROCEDURE — 88305 TISSUE EXAM BY PATHOLOGIST: CPT | Performed by: PATHOLOGY

## 2018-06-02 PROCEDURE — 87206 SMEAR FLUORESCENT/ACID STAI: CPT | Mod: 91

## 2018-06-02 PROCEDURE — 25000003 PHARM REV CODE 250: Performed by: REGISTERED NURSE

## 2018-06-02 PROCEDURE — 84145 PROCALCITONIN (PCT): CPT

## 2018-06-02 PROCEDURE — 84100 ASSAY OF PHOSPHORUS: CPT

## 2018-06-02 PROCEDURE — 87102 FUNGUS ISOLATION CULTURE: CPT

## 2018-06-02 PROCEDURE — 80053 COMPREHEN METABOLIC PANEL: CPT | Mod: 91

## 2018-06-02 PROCEDURE — 82962 GLUCOSE BLOOD TEST: CPT | Performed by: PODIATRIST

## 2018-06-02 PROCEDURE — 27201423 OPTIME MED/SURG SUP & DEVICES STERILE SUPPLY: Performed by: PODIATRIST

## 2018-06-02 PROCEDURE — 87147 CULTURE TYPE IMMUNOLOGIC: CPT

## 2018-06-02 PROCEDURE — 0JDQ0ZZ EXTRACTION OF RIGHT FOOT SUBCUTANEOUS TISSUE AND FASCIA, OPEN APPROACH: ICD-10-PCS | Performed by: PODIATRIST

## 2018-06-02 PROCEDURE — 63600175 PHARM REV CODE 636 W HCPCS: Performed by: STUDENT IN AN ORGANIZED HEALTH CARE EDUCATION/TRAINING PROGRAM

## 2018-06-02 RX ORDER — DIPHENHYDRAMINE HYDROCHLORIDE 50 MG/ML
25 INJECTION INTRAMUSCULAR; INTRAVENOUS EVERY 6 HOURS PRN
Status: DISCONTINUED | OUTPATIENT
Start: 2018-06-02 | End: 2018-06-02 | Stop reason: HOSPADM

## 2018-06-02 RX ORDER — FENTANYL CITRATE 50 UG/ML
INJECTION, SOLUTION INTRAMUSCULAR; INTRAVENOUS
Status: DISCONTINUED | OUTPATIENT
Start: 2018-06-02 | End: 2018-06-02

## 2018-06-02 RX ORDER — FENTANYL CITRATE 50 UG/ML
25 INJECTION, SOLUTION INTRAMUSCULAR; INTRAVENOUS EVERY 5 MIN PRN
Status: DISCONTINUED | OUTPATIENT
Start: 2018-06-02 | End: 2018-06-02 | Stop reason: HOSPADM

## 2018-06-02 RX ORDER — PROPOFOL 10 MG/ML
VIAL (ML) INTRAVENOUS
Status: DISCONTINUED | OUTPATIENT
Start: 2018-06-02 | End: 2018-06-02

## 2018-06-02 RX ORDER — MIDAZOLAM HYDROCHLORIDE 1 MG/ML
INJECTION, SOLUTION INTRAMUSCULAR; INTRAVENOUS
Status: DISCONTINUED | OUTPATIENT
Start: 2018-06-02 | End: 2018-06-02

## 2018-06-02 RX ORDER — GLYCOPYRROLATE 0.2 MG/ML
INJECTION INTRAMUSCULAR; INTRAVENOUS
Status: DISCONTINUED | OUTPATIENT
Start: 2018-06-02 | End: 2018-06-02

## 2018-06-02 RX ORDER — PROPOFOL 10 MG/ML
VIAL (ML) INTRAVENOUS CONTINUOUS PRN
Status: DISCONTINUED | OUTPATIENT
Start: 2018-06-02 | End: 2018-06-02

## 2018-06-02 RX ORDER — LIDOCAINE HYDROCHLORIDE 10 MG/ML
INJECTION, SOLUTION EPIDURAL; INFILTRATION; INTRACAUDAL; PERINEURAL
Status: DISCONTINUED | OUTPATIENT
Start: 2018-06-02 | End: 2018-06-02 | Stop reason: HOSPADM

## 2018-06-02 RX ORDER — LIDOCAINE HCL/PF 100 MG/5ML
SYRINGE (ML) INTRAVENOUS
Status: DISCONTINUED | OUTPATIENT
Start: 2018-06-02 | End: 2018-06-02

## 2018-06-02 RX ORDER — SODIUM CHLORIDE 0.9 % (FLUSH) 0.9 %
3 SYRINGE (ML) INJECTION
Status: DISCONTINUED | OUTPATIENT
Start: 2018-06-02 | End: 2018-06-15 | Stop reason: HOSPADM

## 2018-06-02 RX ORDER — KETAMINE HYDROCHLORIDE 10 MG/ML
INJECTION, SOLUTION INTRAMUSCULAR; INTRAVENOUS
Status: DISCONTINUED | OUTPATIENT
Start: 2018-06-02 | End: 2018-06-02

## 2018-06-02 RX ORDER — BUPIVACAINE HYDROCHLORIDE 2.5 MG/ML
INJECTION, SOLUTION EPIDURAL; INFILTRATION; INTRACAUDAL
Status: DISCONTINUED | OUTPATIENT
Start: 2018-06-02 | End: 2018-06-02 | Stop reason: HOSPADM

## 2018-06-02 RX ORDER — OXYCODONE HYDROCHLORIDE 5 MG/1
5 TABLET ORAL EVERY 6 HOURS PRN
Status: DISCONTINUED | OUTPATIENT
Start: 2018-06-02 | End: 2018-06-15 | Stop reason: HOSPADM

## 2018-06-02 RX ORDER — PHENYLEPHRINE HYDROCHLORIDE 10 MG/ML
INJECTION INTRAVENOUS
Status: DISCONTINUED | OUTPATIENT
Start: 2018-06-02 | End: 2018-06-02

## 2018-06-02 RX ORDER — CLINDAMYCIN PHOSPHATE 900 MG/50ML
INJECTION, SOLUTION INTRAVENOUS
Status: DISCONTINUED | OUTPATIENT
Start: 2018-06-02 | End: 2018-06-02

## 2018-06-02 RX ADMIN — INSULIN ASPART 15 UNITS: 100 INJECTION, SOLUTION INTRAVENOUS; SUBCUTANEOUS at 06:06

## 2018-06-02 RX ADMIN — INSULIN ASPART 4 UNITS: 100 INJECTION, SOLUTION INTRAVENOUS; SUBCUTANEOUS at 06:06

## 2018-06-02 RX ADMIN — KETAMINE HYDROCHLORIDE 15 MG: 10 INJECTION, SOLUTION INTRAMUSCULAR; INTRAVENOUS at 10:06

## 2018-06-02 RX ADMIN — LIDOCAINE HYDROCHLORIDE 10 ML: 10 INJECTION, SOLUTION EPIDURAL; INFILTRATION; INTRACAUDAL; PERINEURAL at 11:06

## 2018-06-02 RX ADMIN — AZTREONAM 2000 MG: 2 INJECTION, POWDER, LYOPHILIZED, FOR SOLUTION INTRAMUSCULAR; INTRAVENOUS at 01:06

## 2018-06-02 RX ADMIN — INSULIN DETEMIR 45 UNITS: 100 INJECTION, SOLUTION SUBCUTANEOUS at 08:06

## 2018-06-02 RX ADMIN — AZTREONAM 2000 MG: 2 INJECTION, POWDER, LYOPHILIZED, FOR SOLUTION INTRAMUSCULAR; INTRAVENOUS at 08:06

## 2018-06-02 RX ADMIN — KETAMINE HYDROCHLORIDE 15 MG: 10 INJECTION, SOLUTION INTRAMUSCULAR; INTRAVENOUS at 11:06

## 2018-06-02 RX ADMIN — PROPOFOL 30 MG: 10 INJECTION, EMULSION INTRAVENOUS at 10:06

## 2018-06-02 RX ADMIN — LISINOPRIL 40 MG: 20 TABLET ORAL at 01:06

## 2018-06-02 RX ADMIN — KETAMINE HYDROCHLORIDE 10 MG: 10 INJECTION, SOLUTION INTRAMUSCULAR; INTRAVENOUS at 11:06

## 2018-06-02 RX ADMIN — LIDOCAINE HYDROCHLORIDE 20 MG: 20 INJECTION, SOLUTION INTRAVENOUS at 10:06

## 2018-06-02 RX ADMIN — MIDAZOLAM HYDROCHLORIDE 2 MG: 1 INJECTION, SOLUTION INTRAMUSCULAR; INTRAVENOUS at 10:06

## 2018-06-02 RX ADMIN — AZTREONAM 2000 MG: 2 INJECTION, POWDER, LYOPHILIZED, FOR SOLUTION INTRAMUSCULAR; INTRAVENOUS at 04:06

## 2018-06-02 RX ADMIN — INSULIN ASPART 15 UNITS: 100 INJECTION, SOLUTION INTRAVENOUS; SUBCUTANEOUS at 01:06

## 2018-06-02 RX ADMIN — ENOXAPARIN SODIUM 40 MG: 100 INJECTION SUBCUTANEOUS at 05:06

## 2018-06-02 RX ADMIN — FENTANYL CITRATE 25 MCG: 50 INJECTION, SOLUTION INTRAMUSCULAR; INTRAVENOUS at 10:06

## 2018-06-02 RX ADMIN — FENTANYL CITRATE 50 MCG: 50 INJECTION, SOLUTION INTRAMUSCULAR; INTRAVENOUS at 10:06

## 2018-06-02 RX ADMIN — PROPOFOL 70 MCG/KG/MIN: 10 INJECTION, EMULSION INTRAVENOUS at 10:06

## 2018-06-02 RX ADMIN — ONDANSETRON 8 MG: 4 TABLET, ORALLY DISINTEGRATING ORAL at 07:06

## 2018-06-02 RX ADMIN — CARVEDILOL 12.5 MG: 12.5 TABLET, FILM COATED ORAL at 08:06

## 2018-06-02 RX ADMIN — ACETAMINOPHEN 650 MG: 325 TABLET ORAL at 08:06

## 2018-06-02 RX ADMIN — SODIUM CHLORIDE, SODIUM GLUCONATE, SODIUM ACETATE, POTASSIUM CHLORIDE, MAGNESIUM CHLORIDE, SODIUM PHOSPHATE, DIBASIC, AND POTASSIUM PHOSPHATE: .53; .5; .37; .037; .03; .012; .00082 INJECTION, SOLUTION INTRAVENOUS at 10:06

## 2018-06-02 RX ADMIN — METRONIDAZOLE 500 MG: 500 TABLET ORAL at 01:06

## 2018-06-02 RX ADMIN — DAPTOMYCIN 735 MG: 500 INJECTION, POWDER, LYOPHILIZED, FOR SOLUTION INTRAVENOUS at 06:06

## 2018-06-02 RX ADMIN — METRONIDAZOLE 500 MG: 500 TABLET ORAL at 05:06

## 2018-06-02 RX ADMIN — FENTANYL CITRATE 25 MCG: 50 INJECTION, SOLUTION INTRAMUSCULAR; INTRAVENOUS at 12:06

## 2018-06-02 RX ADMIN — INSULIN ASPART 4 UNITS: 100 INJECTION, SOLUTION INTRAVENOUS; SUBCUTANEOUS at 08:06

## 2018-06-02 RX ADMIN — CLINDAMYCIN PHOSPHATE 900 MG: 18 INJECTION, SOLUTION INTRAVENOUS at 11:06

## 2018-06-02 RX ADMIN — PHENYLEPHRINE HYDROCHLORIDE 50 MCG: 10 INJECTION INTRAVENOUS at 11:06

## 2018-06-02 RX ADMIN — ACETAMINOPHEN 650 MG: 325 TABLET ORAL at 12:06

## 2018-06-02 RX ADMIN — ACETAMINOPHEN 650 MG: 325 TABLET ORAL at 05:06

## 2018-06-02 RX ADMIN — METRONIDAZOLE 500 MG: 500 TABLET ORAL at 08:06

## 2018-06-02 RX ADMIN — BUPIVACAINE HYDROCHLORIDE 10 ML: 2.5 INJECTION, SOLUTION EPIDURAL; INFILTRATION; INTRACAUDAL; PERINEURAL at 11:06

## 2018-06-02 RX ADMIN — ATORVASTATIN CALCIUM 80 MG: 20 TABLET, FILM COATED ORAL at 01:06

## 2018-06-02 RX ADMIN — CARVEDILOL 12.5 MG: 12.5 TABLET, FILM COATED ORAL at 01:06

## 2018-06-02 RX ADMIN — GLYCOPYRROLATE 0.2 MG: 0.2 INJECTION, SOLUTION INTRAMUSCULAR; INTRAVENOUS at 10:06

## 2018-06-02 NOTE — SUBJECTIVE & OBJECTIVE
Scheduled Meds:   atorvastatin  80 mg Oral Daily    aztreonam  2,000 mg Intravenous Q8H    carvedilol  12.5 mg Oral BID    DAPTOmycin (CUBICIN)  IV  6 mg/kg Intravenous Q24H    enoxaparin  40 mg Subcutaneous Daily    insulin aspart U-100  15 Units Subcutaneous TIDWM    insulin detemir U-100  45 Units Subcutaneous QHS    lisinopril  40 mg Oral Daily    metroNIDAZOLE  500 mg Oral Q8H     Continuous Infusions:  PRN Meds:acetaminophen, dextrose 50%, dextrose 50%, glucagon (human recombinant), glucose, glucose, insulin aspart U-100, ondansetron, sodium chloride 0.9%    Review of patient's allergies indicates:   Allergen Reactions    Penicillins Anaphylaxis    Shellfish containing products      Other reaction(s): Unknown    Vancomycin Itching    Bactrim  [sulfamethoxazole-trimethoprim] Rash        Past Medical History:   Diagnosis Date    Allergy     CAD (coronary artery disease), native coronary artery 6/25/2013    Cancer     Diabetes mellitus     Diabetes mellitus, type 2     Disorder of kidney and ureter     Heart attack 04/2012    Hyperlipidemia     Hypertension     Muscular pain     post-op after colonoscopy     Past Surgical History:   Procedure Laterality Date    COLONOSCOPY N/A 2/17/2017    Procedure: COLONOSCOPY;  Surgeon: Simon Gomez MD;  Location: [address];  Service: Endoscopy;  Laterality: N/A;    CORONARY ANGIOPLASTY      TONSILLECTOMY         Family History     Problem Relation (Age of Onset)    Heart disease Mother, Brother        Social History Main Topics    Smoking status: Never Smoker    Smokeless tobacco: Never Used    Alcohol use Yes      Comment: rare x1 beer-     Drug use: No    Sexual activity: Not Currently     Review of Systems   Constitutional: Positive for activity change.   Respiratory: Negative for shortness of breath.    Cardiovascular: Negative for chest pain and leg swelling.   Gastrointestinal: Negative for nausea and vomiting.   Genitourinary:  Negative.    Musculoskeletal: Positive for arthralgias.   Skin: Positive for wound.   Neurological: Negative for weakness and numbness.   Psychiatric/Behavioral: Negative.      Objective:     Vital Signs (Most Recent):  Temp: 97.8 °F (36.6 °C) (06/02/18 0829)  Pulse: 74 (06/02/18 0829)  Resp: 16 (06/02/18 0829)  BP: 133/71 (06/02/18 0829)  SpO2: (!) 90 % (06/02/18 0829) Vital Signs (24h Range):  Temp:  [97.5 °F (36.4 °C)-100.1 °F (37.8 °C)] 97.8 °F (36.6 °C)  Pulse:  [] 74  Resp:  [16-20] 16  SpO2:  [90 %-97 %] 90 %  BP: (130-152)/(64-86) 133/71     Weight: 122.5 kg (270 lb)  Body mass index is 35.62 kg/m².    Foot Exam    General  Orientation: alert and oriented to person, place, and time       Right Foot/Ankle     Inspection and Palpation  Tenderness: metatarsals   Swelling: metatarsals   Skin Exam: ulcer;     Neurovascular  Dorsalis pedis: 1+  Posterior tibial: 1+      Left Foot/Ankle      Inspection and Palpation  Tenderness: none   Swelling: none   Skin Exam: erythema;     Neurovascular  Dorsalis pedis: 1+  Posterior tibial: 1+          6/2/18    Wound 1:Plantar right foot   Measurement: 0.5cmx0.5cmx0.1cm  Base: fibrous   Periwound skin: erythema extending up leg  Drainage: purulent   Erythema: moderate  Probe: unable to probe 2/2 to pain  Abscess noted to right plantar foot.               Improved erythema to left leg. No ulceration noted.               Laboratory:  CBC:   Recent Labs  Lab 06/02/18  0506   WBC 13.76*   RBC 4.47*   HGB 13.3*   HCT 38.6*      MCV 86   MCH 29.8   MCHC 34.5     CMP:   Recent Labs  Lab 06/02/18  0506   *   CALCIUM 8.8   ALBUMIN 2.4*   PROT 6.5   *   K 4.0   CO2 24   CL 98   BUN 17   CREATININE 1.0   ALKPHOS 112   ALT 17   AST 14   BILITOT 0.8       Diagnostic Results:  Xray: There is DJD and spurs on the calcaneus.  There is a foreign body soft tissues below in between the 1st and 2nd metatarsals thought to be a shard of glass.  There may be soft tissue  swelling.  No fracture dislocation bone destruction seen.    MRI: No MR evidence of osteomyelitis.    Diffuse subcutaneous and myofascial forefoot edema with multiple plantar skin defects, overall, concerning for cellulitis/myositis.  Associated radiopaque foreign body likely reflecting glass is more conspicuous on comparison foot radiograph.    Nonspecific dermal fluid collection along the plantar aspect of the 1st and 2nd metatarsophalangeal joints, possibly a blister.  An infected fluid collection cannot be excluded.  Suggest correlation with direct visualization and clinical findings.    Clinical Findings:  Abscess right plantar foot.

## 2018-06-02 NOTE — CONSULTS
[facility]  Podiatry  Consult Note    Patient Name: Billy Sheffield Miguel  MRN: [id number]  Admission Date: 6/1/2018  Hospital Length of Stay: 1 days  Attending Physician: Josiah Sawyer, *  Primary Care Provider: BRAD Baker MD     Inpatient consult to Podiatry  Consult performed by: JARAD MORAN  Consult ordered by: REID MUNOZ  Reason for consult: glass in foot/diabetic ulcer        Subjective:     History of Present Illness:  Patient is a 52-year-old male with HTN, MI 2013 s/p 1 stent, DMT2, colon cancer s/p resection 2017 who presents with a right diabetic foot infection. Reports he stepped on a piece of glass two weeks ago. Pt. Was then seen by his PCP who prescribed him Cipro and Clinda. In the last couple of days patient began to notice worsening redness and swelling to right foot. Also complains of severe pain right foot. Also reports multiple episodes of emesis x5 at home two days ago.     Scheduled Meds:   atorvastatin  80 mg Oral Daily    aztreonam  2,000 mg Intravenous Q8H    carvedilol  12.5 mg Oral BID    DAPTOmycin (CUBICIN)  IV  6 mg/kg Intravenous Q24H    enoxaparin  40 mg Subcutaneous Daily    insulin aspart U-100  15 Units Subcutaneous TIDWM    insulin detemir U-100  45 Units Subcutaneous QHS    lisinopril  40 mg Oral Daily    metroNIDAZOLE  500 mg Oral Q8H     Continuous Infusions:  PRN Meds:acetaminophen, dextrose 50%, dextrose 50%, glucagon (human recombinant), glucose, glucose, insulin aspart U-100, ondansetron, sodium chloride 0.9%    Review of patient's allergies indicates:   Allergen Reactions    Penicillins Anaphylaxis    Shellfish containing products      Other reaction(s): Unknown    Vancomycin Itching    Bactrim  [sulfamethoxazole-trimethoprim] Rash        Past Medical History:   Diagnosis Date    Allergy     CAD (coronary artery disease), native coronary artery 6/25/2013    Cancer     Diabetes mellitus     Diabetes mellitus, type 2      Disorder of kidney and ureter     Heart attack 04/2012    Hyperlipidemia     Hypertension     Muscular pain     post-op after colonoscopy     Past Surgical History:   Procedure Laterality Date    COLONOSCOPY N/A 2/17/2017    Procedure: COLONOSCOPY;  Surgeon: Simon Gomez MD;  Location: [address]);  Service: Endoscopy;  Laterality: N/A;    CORONARY ANGIOPLASTY      TONSILLECTOMY         Family History     Problem Relation (Age of Onset)    Heart disease Mother, Brother        Social History Main Topics    Smoking status: Never Smoker    Smokeless tobacco: Never Used    Alcohol use Yes      Comment: rare x1 beer-     Drug use: No    Sexual activity: Not Currently     Review of Systems   Constitutional: Positive for activity change.   Respiratory: Negative for shortness of breath.    Cardiovascular: Negative for chest pain and leg swelling.   Gastrointestinal: Negative for nausea and vomiting.   Genitourinary: Negative.    Musculoskeletal: Positive for arthralgias.   Skin: Positive for wound.   Neurological: Negative for weakness and numbness.   Psychiatric/Behavioral: Negative.      Objective:     Vital Signs (Most Recent):  Temp: 97.8 °F (36.6 °C) (06/02/18 0829)  Pulse: 74 (06/02/18 0829)  Resp: 16 (06/02/18 0829)  BP: 133/71 (06/02/18 0829)  SpO2: (!) 90 % (06/02/18 0829) Vital Signs (24h Range):  Temp:  [97.5 °F (36.4 °C)-100.1 °F (37.8 °C)] 97.8 °F (36.6 °C)  Pulse:  [] 74  Resp:  [16-20] 16  SpO2:  [90 %-97 %] 90 %  BP: (130-152)/(64-86) 133/71     Weight: 122.5 kg (270 lb)  Body mass index is 35.62 kg/m².    Foot Exam    General  Orientation: alert and oriented to person, place, and time       Right Foot/Ankle     Inspection and Palpation  Tenderness: metatarsals   Swelling: metatarsals   Skin Exam: ulcer;     Neurovascular  Dorsalis pedis: 1+  Posterior tibial: 1+      Left Foot/Ankle      Inspection and Palpation  Tenderness: none   Swelling: none   Skin Exam: erythema;      Neurovascular  Dorsalis pedis: 1+  Posterior tibial: 1+          6/2/18    Wound 1:Plantar right foot   Measurement: 0.5cmx0.5cmx0.1cm  Base: fibrous   Periwound skin: erythema extending up leg  Drainage: purulent   Erythema: moderate  Probe: unable to probe 2/2 to pain  Abscess noted to right plantar foot.               Improved erythema to left leg. No ulceration noted.               Laboratory:  CBC:   Recent Labs  Lab 06/02/18  0506   WBC 13.76*   RBC 4.47*   HGB 13.3*   HCT 38.6*      MCV 86   MCH 29.8   MCHC 34.5     CMP:   Recent Labs  Lab 06/02/18  0506   *   CALCIUM 8.8   ALBUMIN 2.4*   PROT 6.5   *   K 4.0   CO2 24   CL 98   BUN 17   CREATININE 1.0   ALKPHOS 112   ALT 17   AST 14   BILITOT 0.8       Diagnostic Results:  Xray: There is DJD and spurs on the calcaneus.  There is a foreign body soft tissues below in between the 1st and 2nd metatarsals thought to be a shard of glass.  There may be soft tissue swelling.  No fracture dislocation bone destruction seen.    MRI: No MR evidence of osteomyelitis.    Diffuse subcutaneous and myofascial forefoot edema with multiple plantar skin defects, overall, concerning for cellulitis/myositis.  Associated radiopaque foreign body likely reflecting glass is more conspicuous on comparison foot radiograph.    Nonspecific dermal fluid collection along the plantar aspect of the 1st and 2nd metatarsophalangeal joints, possibly a blister.  An infected fluid collection cannot be excluded.  Suggest correlation with direct visualization and clinical findings.    Clinical Findings:  Abscess right plantar foot.     Assessment/Plan:     * Diabetic ulcer of right midfoot    Abscess to right plantar foot  US arterial ordered   Plan for I&D right foot in OR today  NPO   Long discussion with patient regarding the procedure in detail.  Informed consent discussed in detail  including all alternatives, risks and complications not limited to consent form. Patient  understands all risks, potential complications, and alternatives, including, but not limited to those listed on the consent form. All questions were answered. No guarantees given or implied as to outcome. Informed verbal and written consent was obtained. Consent forms read, signed, witnessed.   Podiatry will follow      Weightbearing Status: Partial heel WB right   Offloading Device: DARCO shoe     Tonya Valle DPM PGY-3  Pager: [phone]              Type 2 diabetes mellitus 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, with long-term current use of insulin    Per primary             Thank you for your consult. I will follow-up with patient. Please contact us if you have any additional questions.    Tonya Valle MD  Podiatry  [facility]

## 2018-06-02 NOTE — ASSESSMENT & PLAN NOTE
Abscess to right plantar foot  US arterial ordered   Plan for I&D right foot in OR today  NPO   Long discussion with patient regarding the procedure in detail.  Informed consent discussed in detail  including all alternatives, risks and complications not limited to consent form. Patient understands all risks, potential complications, and alternatives, including, but not limited to those listed on the consent form. All questions were answered. No guarantees given or implied as to outcome. Informed verbal and written consent was obtained. Consent forms read, signed, witnessed.   Podiatry will follow      Weightbearing Status: Partial heel WB right   Offloading Device: ISABELLA Valle DPM PGY-3  Pager: [phone]

## 2018-06-02 NOTE — OP NOTE
Operative Note       Surgery Date: 6/2/2018     Surgeon(s) and Role:     * Bridget Santana DPM - Primary     * Tonya Valle MD - Resident - Assisting    Pre-op Diagnosis:  Diabetic ulcer of foot associated with diabetes mellitus due to underlying condition, limited to breakdown of skin [E08.621, L97.501]  Diabetic ulcer of right midfoot associated with diabetes mellitus of other type, unspecified ulcer stage [E13.621, L97.419]    Post-op Diagnosis: Post-Op Diagnosis Codes:     * Diabetic ulcer of foot associated with diabetes mellitus due to underlying condition, limited to breakdown of skin [E08.621, L97.501]     * Diabetic ulcer of right midfoot associated with diabetes mellitus of other type, unspecified ulcer stage [E13.621, L97.419]    Procedure(s) (LRB):  INCISION AND DRAINAGE, ABSCESS (Right)    Anesthesia: Local MAC    Procedure in Detail/Findings:  The patient was brought to the operating room on a stretcher and placed on the operating table in a supine position. Following the successful induction of MAC anesthesia  a local anesthetic block consisting of aproximately 20cc of  1:1 mixture of 1% lidocaine plain + 0.25% marcaine plain was injected. Then, the right foot was scrubbed, prepped and draped in the usual aseptic manner.. A time out was performed. No tourniquet used for the duration of the procedure.     Attention was directed to the right plantar foot. A #15 blade was used to make the incision overlying the plantar bullae. At this time copious amounts of purulent drainage noted from ulceration. Next freer elevator used to inspect wound, tracking noted to dorsal foot at 1st interdigital space. Wound noted to dorsal right foot. At this time pulse lavage used to flush wound with 3L NS. C arm used to verify removal of foreign body. AP view obtained of right foot. No foreign body noted. Wound copiously flushed with saline. Aerobic, anaerobic cultures obtained. Minimal bleeding noted to wound. Wound  packed with 1in plain packing soaked in saline. Wrapped with kerlix and ACE.       The patient tolerated the procedure and anesthesia well. Following a period of post op monitoring, the patient will be returned to inpatient status with the following written and oral post op instructions:     1. Keep dressing dry and intact until reassessment tomorrow   2. Complete non weightbearing to the right foot.    Tonya Valle DPM PGY-3    Estimated Blood Loss:<5ml           Specimens     Start     Ordered    06/02/18 1227  Specimen to Pathology - Surgery  Once      06/02/18 1228        Implants: * No implants in log *           Disposition: PACU - hemodynamically stable.           Condition: Good    Attestation:  I was present and scrubbed for the entire procedure.

## 2018-06-02 NOTE — PROGRESS NOTES
[facility] Hospital Medicine  Progress Note    Patient Name: Billy Sheffield Miguel  MRN: [id number]  Patient Class: IP- Inpatient   Admission Date: 6/1/2018  Length of Stay: 1 days  Attending Physician: Josiah Sawyer, *  Primary Care Provider: BRAD Baker MD    [facility] Medicine Team: [facility] HOSP MED 2 Jah Gordon MD    Subjective:     Principal Problem:Diabetic ulcer of right midfoot    HPI:  Patient is a 52-year-old male with HTN, MI 2013 s/p 1 stent, DMT2, colon cancer s/p resection 2017 who presents after failing outpatient treatment for a right ulcer.  Patient states that he stepped on a piece of glass at work where he is  on the 19th of May and it went through his shoe. At that time he removed the glass, used antiseptic and antibacterial on his foot and felt that things are going OK. He didn't have any issues until last Saturday The 26th, when he noted that a puffy cherry blister was occurring on his plantar surface near the first digit. On Tuesday, the 29th he presented to his primary care physician in [place] where he was started on clinda and cipro. Patient notes that the clinda and Cipro gave him diarrhea and vomiting. He stated that he had diarrhea three times a day with some form, mostly liquid, no blood and it has been the same since it started. His vomiting occurred five times yesterday, and was brownish with food. His last diarrhea episode was at 10:30 this morning, and vomiting at 4:30 in the afternoon. The patient presented to his outside PCP today, without relief of symptoms on antibiotics. Patient denies fever, chills, nausea, vomiting, diarrhea, dysuria, rash, shortness of breath, chest pain, abdominal pain.  Patient states that his diabetes started 15 to 20 years ago, and has been reasonably well controlled since then with no neuropathy, optic issues or kidney issues. Patient notes that he had a stent in 2013 for coronary artery disease, and colon cancer  that was resected in April 2017 without any issues on follow up.    Hospital Course:  6/1/2018- admission date. Aztreonam, Flagyl, Daptomycin started. Podiatry consulted for AM.  6/2/2018- NAEON. No diarrhea anymore, unlikely C. Diff, tolerating meds well. Podiatry to OR today.    Interval History: See hospital course above.     Review of Systems   Constitutional: Negative for chills, fever and unexpected weight change.   HENT: Negative for hearing loss.    Eyes: Negative for visual disturbance.   Respiratory: Negative for shortness of breath.    Cardiovascular: Negative for chest pain and leg swelling.   Gastrointestinal: Negative for abdominal pain, constipation, diarrhea, nausea and vomiting.   Genitourinary: Negative for dysuria and hematuria.   Musculoskeletal: Negative for arthralgias.   Skin: Positive for color change, rash and wound.   Neurological: Negative for seizures and weakness.   Hematological: Negative for adenopathy. Does not bruise/bleed easily.     Objective:     Vital Signs (Most Recent):  Temp: 97.8 °F (36.6 °C) (06/02/18 0829)  Pulse: 74 (06/02/18 0829)  Resp: 16 (06/02/18 0829)  BP: 133/71 (06/02/18 0829)  SpO2: (!) 90 % (06/02/18 0829) Vital Signs (24h Range):  Temp:  [97.5 °F (36.4 °C)-100.1 °F (37.8 °C)] 97.8 °F (36.6 °C)  Pulse:  [] 74  Resp:  [16-20] 16  SpO2:  [90 %-97 %] 90 %  BP: (130-152)/(64-86) 133/71     Weight: 122.5 kg (270 lb)  Body mass index is 35.62 kg/m².    Intake/Output Summary (Last 24 hours) at 06/02/18 1046  Last data filed at 06/02/18 0600   Gross per 24 hour   Intake              700 ml   Output              801 ml   Net             -101 ml      Physical Exam   Constitutional: He is oriented to person, place, and time. He appears well-developed and well-nourished. No distress.   HENT:   Head: Normocephalic and atraumatic.   Eyes: EOM are normal. Pupils are equal, round, and reactive to light.   Neck: No tracheal deviation present. No thyromegaly present.    Cardiovascular: Normal rate and regular rhythm.    No murmur heard.  Pulmonary/Chest: Effort normal and breath sounds normal. He has no wheezes. He has no rales.   Abdominal: Soft. He exhibits no mass. There is no tenderness. No hernia.   Musculoskeletal: He exhibits edema and tenderness.   Neurological: He is alert and oriented to person, place, and time. No cranial nerve deficit. He exhibits normal muscle tone.   Skin: Skin is warm. Rash noted. He is not diaphoretic. There is erythema.   Psychiatric: He has a normal mood and affect.   Vitals reviewed.      Significant Labs:   CBC:   Recent Labs  Lab 06/01/18  1445 06/01/18  1506 06/02/18  0025 06/02/18  0506   WBC 15.66*  --  13.51* 13.76*   HGB 14.4  --  12.4* 13.3*   HCT 41.9 44 36.5* 38.6*     --  245 252     CMP:   Recent Labs  Lab 06/01/18  1445 06/02/18  0025 06/02/18  0506   * 131* 132*   K 4.1 3.9 4.0   CL 95 98 98   CO2 24 22* 24   * 291* 239*   BUN 19 19 17   CREATININE 1.1 1.1 1.0   CALCIUM 10.0 8.7 8.8   PROT 8.0 6.3 6.5   ALBUMIN 3.0* 2.4* 2.4*   BILITOT 1.5* 0.8 0.8   ALKPHOS 117 96 112   AST 16 12 14   ALT 21 16 17   ANIONGAP 12 11 10   EGFRNONAA >60.0 >60.0 >60.0       Significant Imaging: I have reviewed all pertinent imaging results/findings within the past 24 hours.    Assessment/Plan:      * Diabetic ulcer of right midfoot    Cellulitis  Foreign object (glass) in foot  - Suspect: R foot ulcer, cellulitis, possible osteomyelitis due to stepping on glass  - Lab/rad/micro: BCx NGTD, WBC ct- improving  - Consult: Podiatry- OR today  - Treatment: Aztreonam, flagyl, daptomycin  - Status: currently patient is stable, continue to monitor              Leukocytosis    - Suspect this is due to glass lodged in patient's foot and probable infection, cellulitis, potential osteomyelitis  -See above          Type 2 diabetes mellitus 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, with long-term  current use of insulin    -Patient with uncontrolled diabetes despite taking multiple medications at home with peripheral neuropathy  - Patient has 's on admit, anion gap 14  - Start treating with 80% home meds- 45 BB and 15 TID with meals. (home meds- 55u basal bolus, 20 prandial TIDWM, metformin, trulicity, glargine) will increase as needed  - Continue to monitor             HTN (hypertension)    - Patient is asymptomatic and stable  - Treating with home meds- coreg, lisinopril  - Continue to monitor           CAD s/p PCI of LAD (SHELBY) in May 2013    - Patient on aspirin, atorvastatin  - asymptomatic          Dyslipidemia    - Treating with home meds- Atorvastatin          Colon cancer    - Malignant colon polyp removed endoscopically, no residual cancer s/p lap sigmoid colectomy- April 2017            VTE Risk Mitigation         Ordered     IP VTE HIGH RISK PATIENT  Once      06/02/18 0504     enoxaparin injection 40 mg  Daily      06/01/18 1542              Jah Gordon MD  Department of Hospital Medicine   [facility]

## 2018-06-02 NOTE — ANESTHESIA PREPROCEDURE EVALUATION
06/01/2018    Pre-operative evaluation for Procedure(s) (LRB):  INCISION AND DRAINAGE, ABSCESS (Right)    Billy Yatesn is a 52 y.o. male with HTN, CAD with MI in 2013 s/p SHELBY to LAD, DM Type II, colon cancer s/p resection 2017, morbid obesity, and PVD who presents after failing outpatient treatment for a right ulcer.  Will go for abscess I&D.    Patient Active Problem List   Diagnosis    HTN (hypertension)    NSTEMI May 2013 - peak troponin 0.22    Dyslipidemia    Obesity, Class II, BMI 35-39.9, with comorbidity    CAD s/p PCI of LAD (SHELBY) in May 2013    BDR (background diabetic retinopathy) - Both Eyes    Type II or unspecified type diabetes mellitus with cardiovascular complication, uncontrolled    PVD (peripheral vascular disease)    Sleep apnea    Proteinuria    Screening    Edema    Hypertension associated with diabetes    Type 2 diabetes mellitus with diabetic peripheral angiopathy without gangrene, with long-term current use of insulin    Colon cancer    Onychomycosis of multiple toenails with type 2 diabetes mellitus    Type 2 diabetes mellitus with hyperglycemia, with long-term current use of insulin    Type 2 diabetes mellitus 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, with long-term current use of insulin    Diabetic ulcer of right midfoot    Leukocytosis    Diarrhea    Vomiting       Review of patient's allergies indicates:   Allergen Reactions    Penicillins Anaphylaxis    Shellfish containing products      Other reaction(s): Unknown    Vancomycin Itching    Bactrim  [sulfamethoxazole-trimethoprim] Rash       No current facility-administered medications on file prior to encounter.      Current Outpatient Prescriptions on File Prior to Encounter   Medication Sig Dispense Refill    aspirin 81 MG Chew Take 81 mg by mouth  "every evening. swallows      atorvastatin (LIPITOR) 80 MG tablet Take 1 tablet (80 mg total) by mouth once daily. 30 tablet 11    blood sugar diagnostic Strp Strips and lancets    Use before meals and bedtime 150 strip 12    carvedilol (COREG) 12.5 MG tablet Take 1 tablet (12.5 mg total) by mouth 2 (two) times daily. 60 tablet 3    ciprofloxacin HCl (CIPRO) 500 MG tablet Take 1 tablet (500 mg total) by mouth every 12 (twelve) hours. 28 tablet 0    clindamycin (CLEOCIN) 300 MG capsule Take 1 capsule (300 mg total) by mouth every 6 (six) hours. 56 capsule 0    dulaglutide (TRULICITY) 1.5 mg/0.5 mL PnIj Inject 1.5 mg into the skin every 7 days. 4 Syringe 6    ibuprofen (ADVIL,MOTRIN) 800 MG tablet Take 1 tablet (800 mg total) by mouth 3 (three) times daily as needed for Pain. 50 tablet 0    insulin glargine (LANTUS SOLOSTAR) 100 unit/mL (3 mL) InPn pen INJECT 55 UNITS SUBCUTANEOUSLY IN THE EVENING 2 Box 3    insulin lispro (HUMALOG) 100 unit/mL injection Inject 20 Units into the skin 3 (three) times daily before meals. 6 vial 11    lisinopril (PRINIVIL,ZESTRIL) 40 MG tablet Take 1 tablet (40 mg total) by mouth once daily. 30 tablet 11    metFORMIN (GLUCOPHAGE) 1000 MG tablet Take 1 tablet (1,000 mg total) by mouth 2 (two) times daily with meals. 60 tablet 6    nitroGLYCERIN (NITROSTAT) 0.4 MG SL tablet Place 1 tablet (0.4 mg total) under the tongue every 5 (five) minutes as needed for Chest pain. 30 tablet 1    pen needle, diabetic 31 gauge x 3/16" Ndle Inject 1 each into the skin 3 (three) times daily before meals. 100 each 12       Past Surgical History:   Procedure Laterality Date    COLONOSCOPY N/A 2/17/2017    Procedure: COLONOSCOPY;  Surgeon: Simon Gomez MD;  Location: [address]);  Service: Endoscopy;  Laterality: N/A;    CORONARY ANGIOPLASTY      TONSILLECTOMY         Social History     Social History    Marital status:      Spouse name: N/A    Number of children: N/A    " Years of education: N/A     Occupational History    Not on file.     Social History Main Topics    Smoking status: Never Smoker    Smokeless tobacco: Never Used    Alcohol use Yes      Comment: rare x1 beer-     Drug use: No    Sexual activity: Not Currently     Other Topics Concern    Not on file     Social History Narrative    No narrative on file         CBC:   Recent Labs      06/01/18   1445  06/01/18   1506   WBC  15.66*   --    RBC  4.89   --    HGB  14.4   --    HCT  41.9  44   PLT  263   --    MCV  86   --    MCH  29.4   --    MCHC  34.4   --        CMP:   Recent Labs      06/01/18   1445   NA  131*   K  4.1   CL  95   CO2  24   BUN  19   CREATININE  1.1   GLU  311*   CALCIUM  10.0   ALBUMIN  3.0*   PROT  8.0   ALKPHOS  117   ALT  21   AST  16   BILITOT  1.5*       INR  No results for input(s): PT, INR, PROTIME, APTT in the last 72 hours.        Diagnostic Studies:      EKG:  Sinus bradycardia  Left axis deviation  When compared with ECG of 24-MAY-2013 03:32,  Premature atrial complexes are no longer Present  T wave inversion no longer evident in Anterior leads  Confirmed by ARIEL AMEZCUA MD ([id number]) on 3/13/2017 5:58:53 PM    2D Echo:  Results for orders placed or performed during the hospital encounter of 05/22/13   2D Echocardiogram with Color Flow Doppler   Result Value Ref Range    EF 60     Diastolic Dysfunction No          Anesthesia Evaluation    I have reviewed the Patient Summary Reports.    I have reviewed the Nursing Notes.   I have reviewed the Medications.     Review of Systems  Anesthesia Hx:  Denies Family Hx of Anesthesia complications.   Denies Personal Hx of Anesthesia complications.   Hematology/Oncology:         -- Denies Anemia: --  Cancer in past history:    Cardiovascular:   Hypertension Past MI CAD  CABG/stent      PVD hyperlipidemia    Pulmonary:   Sleep Apnea    Hepatic/GI:   Denies GERD.    Neurological:   Denies CVA. Denies Seizures.    Endocrine:   Diabetes, type 2     Psych:  Psychiatric Normal           Physical Exam  General:  Well nourished    Airway/Jaw/Neck:  Airway Findings: Mouth Opening: Normal Tongue: Normal  General Airway Assessment: Adult  Jaw/Neck Findings:     Neck ROM: Normal ROM  Neck Findings:  Girth Increased, Short Neck      Dental:  Dental Findings:         Mental Status:  Mental Status Findings:  Cooperative, Alert and Oriented         Anesthesia Plan  Type of Anesthesia, risks & benefits discussed:  Anesthesia Type:  general, regional, MAC  Patient's Preference:   Intra-op Monitoring Plan: standard ASA monitors  Intra-op Monitoring Plan Comments:   Post Op Pain Control Plan: multimodal analgesia, IV/PO Opioids PRN and per primary service following discharge from PACU  Post Op Pain Control Plan Comments:   Induction:   IV  Beta Blocker:  Patient is on a Beta-Blocker and has received one dose within the past 24 hours (No further documentation required).       Informed Consent: Patient understands risks and agrees with Anesthesia plan.  Questions answered. Anesthesia consent signed with patient.  ASA Score: 3     Day of Surgery Review of History & Physical:    H&P update referred to the surgeon.         Ready For Surgery From Anesthesia Perspective.

## 2018-06-02 NOTE — TRANSFER OF CARE
"Anesthesia Transfer of Care Note    Patient: Billy Rivera    Procedure(s) Performed: Procedure(s) (LRB):  INCISION AND DRAINAGE, ABSCESS (Right)    Patient location: PACU    Anesthesia Type: general    Transport from OR: Transported from OR on 6-10 L/min O2 by face mask with adequate spontaneous ventilation    Post pain: adequate analgesia    Post assessment: tolerated procedure well and no apparent anesthetic complications    Post vital signs: stable    Level of consciousness: sedated    Nausea/Vomiting: no nausea/vomiting    Complications: none    Transfer of care protocol was followed      Last vitals:   Visit Vitals  /63   Pulse 60   Temp 36.5 °C (97.7 °F) (Temporal)   Resp 18   Ht 6' 1" (1.854 m)   Wt 122.5 kg (270 lb)   SpO2 97%   BMI 35.62 kg/m²     "

## 2018-06-02 NOTE — ASSESSMENT & PLAN NOTE
Cellulitis  Foreign object (glass) in foot  - Suspect: R foot ulcer, cellulitis, possible osteomyelitis due to stepping on glass  - Lab/rad/micro: BCx NGTD, WBC ct- improving  - Consult: Podiatry- OR today  - Treatment: Aztreonam, flagyl, daptomycin  - Status: currently patient is stable, continue to monitor

## 2018-06-02 NOTE — HPI
Patient is a 52-year-old male with HTN, MI 2013 s/p 1 stent, DMT2, colon cancer s/p resection 2017 who presents with a right diabetic foot infection. Reports he stepped on a piece of glass two weeks ago. Pt. Was then seen by his PCP who prescribed him Cipro and Clinda. In the last couple of days patient began to notice worsening redness and swelling to right foot. Also complains of severe pain right foot. Also reports multiple episodes of emesis x5 at home two days ago.

## 2018-06-02 NOTE — SUBJECTIVE & OBJECTIVE
Interval History: See hospital course above.     Review of Systems   Constitutional: Negative for chills, fever and unexpected weight change.   HENT: Negative for hearing loss.    Eyes: Negative for visual disturbance.   Respiratory: Negative for shortness of breath.    Cardiovascular: Negative for chest pain and leg swelling.   Gastrointestinal: Negative for abdominal pain, constipation, diarrhea, nausea and vomiting.   Genitourinary: Negative for dysuria and hematuria.   Musculoskeletal: Negative for arthralgias.   Skin: Positive for color change, rash and wound.   Neurological: Negative for seizures and weakness.   Hematological: Negative for adenopathy. Does not bruise/bleed easily.     Objective:     Vital Signs (Most Recent):  Temp: 97.8 °F (36.6 °C) (06/02/18 0829)  Pulse: 74 (06/02/18 0829)  Resp: 16 (06/02/18 0829)  BP: 133/71 (06/02/18 0829)  SpO2: (!) 90 % (06/02/18 0829) Vital Signs (24h Range):  Temp:  [97.5 °F (36.4 °C)-100.1 °F (37.8 °C)] 97.8 °F (36.6 °C)  Pulse:  [] 74  Resp:  [16-20] 16  SpO2:  [90 %-97 %] 90 %  BP: (130-152)/(64-86) 133/71     Weight: 122.5 kg (270 lb)  Body mass index is 35.62 kg/m².    Intake/Output Summary (Last 24 hours) at 06/02/18 1046  Last data filed at 06/02/18 0600   Gross per 24 hour   Intake              700 ml   Output              801 ml   Net             -101 ml      Physical Exam   Constitutional: He is oriented to person, place, and time. He appears well-developed and well-nourished. No distress.   HENT:   Head: Normocephalic and atraumatic.   Eyes: EOM are normal. Pupils are equal, round, and reactive to light.   Neck: No tracheal deviation present. No thyromegaly present.   Cardiovascular: Normal rate and regular rhythm.    No murmur heard.  Pulmonary/Chest: Effort normal and breath sounds normal. He has no wheezes. He has no rales.   Abdominal: Soft. He exhibits no mass. There is no tenderness. No hernia.   Musculoskeletal: He exhibits edema and  tenderness.   Neurological: He is alert and oriented to person, place, and time. No cranial nerve deficit. He exhibits normal muscle tone.   Skin: Skin is warm. Rash noted. He is not diaphoretic. There is erythema.   Psychiatric: He has a normal mood and affect.   Vitals reviewed.      Significant Labs:   CBC:   Recent Labs  Lab 06/01/18  1445 06/01/18  1506 06/02/18  0025 06/02/18  0506   WBC 15.66*  --  13.51* 13.76*   HGB 14.4  --  12.4* 13.3*   HCT 41.9 44 36.5* 38.6*     --  245 252     CMP:   Recent Labs  Lab 06/01/18  1445 06/02/18  0025 06/02/18  0506   * 131* 132*   K 4.1 3.9 4.0   CL 95 98 98   CO2 24 22* 24   * 291* 239*   BUN 19 19 17   CREATININE 1.1 1.1 1.0   CALCIUM 10.0 8.7 8.8   PROT 8.0 6.3 6.5   ALBUMIN 3.0* 2.4* 2.4*   BILITOT 1.5* 0.8 0.8   ALKPHOS 117 96 112   AST 16 12 14   ALT 21 16 17   ANIONGAP 12 11 10   EGFRNONAA >60.0 >60.0 >60.0       Significant Imaging: I have reviewed all pertinent imaging results/findings within the past 24 hours.

## 2018-06-02 NOTE — ASSESSMENT & PLAN NOTE
- appears related to abx (cipro and clinda)- stopped those abx  - zofran as needed  - continue to monitor now that abx are changed

## 2018-06-02 NOTE — H&P
[facility] Medicine  History & Physical    Patient Name: Billy Sheffield Miguel  MRN: [id number]  Admission Date: 6/1/2018  Attending Physician: Josiah Sawyer, *   Primary Care Provider: BRAD Baker MD    [facility] Medicine Team: [facility] 2 Jah Gordon MD     Patient information was obtained from patient and ER records.     Subjective:     Principal Problem:Diabetic ulcer of right midfoot    Chief Complaint:   Chief Complaint   Patient presents with    Foot Pain     right foot pain x 6 days.  Pt went to clinic and was put on antibiotics.  Infection is not improving so patient was sent here for IV antibiotics and foot care.         HPI: Patient is a 52-year-old male with HTN, MI 2013 s/p 1 stent, DMT2, colon cancer s/p resection 2017 who presents after failing outpatient treatment for a right ulcer.  Patient states that he stepped on a piece of glass at work where he is  on the 19th of May and it went through his shoe. At that time he removed the glass, used antiseptic and antibacterial on his foot and felt that things are going OK. He didn't have any issues until last Saturday The 26th, when he noted that a puffy cherry blister was occurring on his plantar surface near the first digit. On Tuesday, the 29th he presented to his primary care physician in [place] where he was started on clinda and cipro. Patient notes that the clinda and Cipro gave him diarrhea and vomiting. He stated that he had diarrhea three times a day with some form, mostly liquid, no blood and it has been the same since it started. His vomiting occurred five times yesterday, and was brownish with food. His last diarrhea episode was at 10:30 this morning, and vomiting at 4:30 in the afternoon. The patient presented to his outside PCP today, without relief of symptoms on antibiotics. Patient denies fever, chills, nausea, vomiting, diarrhea, dysuria, rash, shortness of breath, chest pain,  abdominal pain.  Patient states that his diabetes started 15 to 20 years ago, and has been reasonably well controlled since then with no neuropathy, optic issues or kidney issues. Patient notes that he had a stent in 2013 for coronary artery disease, and colon cancer that was resected in April 2017 without any issues on follow up.    Past Medical History:   Diagnosis Date    Allergy     CAD (coronary artery disease), native coronary artery 6/25/2013    Cancer     Diabetes mellitus     Diabetes mellitus, type 2     Disorder of kidney and ureter     Heart attack 04/2012    Hyperlipidemia     Hypertension     Muscular pain     post-op after colonoscopy       Past Surgical History:   Procedure Laterality Date    COLONOSCOPY N/A 2/17/2017    Procedure: COLONOSCOPY;  Surgeon: Simon Gomez MD;  Location: [address]);  Service: Endoscopy;  Laterality: N/A;    CORONARY ANGIOPLASTY      TONSILLECTOMY         Review of patient's allergies indicates:   Allergen Reactions    Penicillins Anaphylaxis    Shellfish containing products      Other reaction(s): Unknown    Vancomycin Itching    Bactrim  [sulfamethoxazole-trimethoprim] Rash       No current facility-administered medications on file prior to encounter.      Current Outpatient Prescriptions on File Prior to Encounter   Medication Sig    aspirin 81 MG Chew Take 81 mg by mouth every evening. swallows    atorvastatin (LIPITOR) 80 MG tablet Take 1 tablet (80 mg total) by mouth once daily.    blood sugar diagnostic Strp Strips and lancets    Use before meals and bedtime    carvedilol (COREG) 12.5 MG tablet Take 1 tablet (12.5 mg total) by mouth 2 (two) times daily.    ciprofloxacin HCl (CIPRO) 500 MG tablet Take 1 tablet (500 mg total) by mouth every 12 (twelve) hours.    clindamycin (CLEOCIN) 300 MG capsule Take 1 capsule (300 mg total) by mouth every 6 (six) hours.    dulaglutide (TRULICITY) 1.5 mg/0.5 mL PnOttoniel Inject 1.5 mg into the skin  "every 7 days.    ibuprofen (ADVIL,MOTRIN) 800 MG tablet Take 1 tablet (800 mg total) by mouth 3 (three) times daily as needed for Pain.    insulin glargine (LANTUS SOLOSTAR) 100 unit/mL (3 mL) InPn pen INJECT 55 UNITS SUBCUTANEOUSLY IN THE EVENING    insulin lispro (HUMALOG) 100 unit/mL injection Inject 20 Units into the skin 3 (three) times daily before meals.    lisinopril (PRINIVIL,ZESTRIL) 40 MG tablet Take 1 tablet (40 mg total) by mouth once daily.    metFORMIN (GLUCOPHAGE) 1000 MG tablet Take 1 tablet (1,000 mg total) by mouth 2 (two) times daily with meals.    nitroGLYCERIN (NITROSTAT) 0.4 MG SL tablet Place 1 tablet (0.4 mg total) under the tongue every 5 (five) minutes as needed for Chest pain.    pen needle, diabetic 31 gauge x 3/16" Ndle Inject 1 each into the skin 3 (three) times daily before meals.     Family History     Problem Relation (Age of Onset)    Heart disease Mother, Brother        Social History Main Topics    Smoking status: Never Smoker    Smokeless tobacco: Never Used    Alcohol use Yes      Comment: rare x1 beer-     Drug use: No    Sexual activity: Not Currently     Review of Systems   Constitutional: Negative for chills, fever and unexpected weight change.   HENT: Negative for hearing loss.    Eyes: Negative for visual disturbance.   Respiratory: Negative for shortness of breath.    Cardiovascular: Negative for chest pain and leg swelling.   Gastrointestinal: Negative for abdominal pain, constipation, diarrhea, nausea and vomiting.   Genitourinary: Negative for dysuria and hematuria.   Musculoskeletal: Negative for arthralgias.   Skin: Positive for color change, rash and wound.   Neurological: Negative for seizures and weakness.   Hematological: Negative for adenopathy. Does not bruise/bleed easily.     Objective:     Vital Signs (Most Recent):  Temp: 99.2 °F (37.3 °C) (06/01/18 1259)  Pulse: 103 (06/01/18 1259)  Resp: 20 (06/01/18 1259)  BP: 137/75 (06/01/18 1259)  SpO2: " 96 % (06/01/18 1259) Vital Signs (24h Range):  Temp:  [99 °F (37.2 °C)-99.2 °F (37.3 °C)] 99.2 °F (37.3 °C)  Pulse:  [103] 103  Resp:  [20] 20  SpO2:  [96 %] 96 %  BP: (132-137)/(75-86) 137/75     Weight: 122.5 kg (270 lb)  Body mass index is 35.62 kg/m².    Physical Exam   Constitutional: He is oriented to person, place, and time. He appears well-developed and well-nourished. No distress.   HENT:   Head: Normocephalic and atraumatic.   Eyes: EOM are normal. Pupils are equal, round, and reactive to light.   Neck: No tracheal deviation present. No thyromegaly present.   Cardiovascular: Normal rate and regular rhythm.    No murmur heard.  Pulmonary/Chest: Effort normal and breath sounds normal. He has no wheezes. He has no rales.   Abdominal: Soft. He exhibits no mass. There is no tenderness. No hernia.   Musculoskeletal: He exhibits edema and tenderness.   Neurological: He is alert and oriented to person, place, and time. No cranial nerve deficit. He exhibits normal muscle tone.   Skin: Skin is warm. Rash noted. He is not diaphoretic. There is erythema.   Psychiatric: He has a normal mood and affect.   Vitals reviewed.        CRANIAL NERVES     CN III, IV, VI   Pupils are equal, round, and reactive to light.  Extraocular motions are normal.        Significant Labs:   CBC:   Recent Labs  Lab 06/01/18  1445 06/01/18  1506   WBC 15.66*  --    HGB 14.4  --    HCT 41.9 44     --      CMP: No results for input(s): NA, K, CL, CO2, GLU, BUN, CREATININE, CALCIUM, PROT, ALBUMIN, BILITOT, ALKPHOS, AST, ALT, ANIONGAP, EGFRNONAA in the last 48 hours.    Invalid input(s): ESTGFAFRICA    Significant Imaging: I have reviewed all pertinent imaging results/findings within the past 24 hours.   Imaging Results          X-Ray Foot Complete Right (Final result)  Result time 06/01/18 15:37:38    Final result by Abel Wise III, MD (06/01/18 15:37:38)                 Impression:      See above      Electronically signed  by: Abel Wise MD  Date:    06/01/2018  Time:    15:37             Narrative:    EXAMINATION:  XR FOOT COMPLETE 3 VIEW RIGHT    FINDINGS:  There is DJD and spurs on the calcaneus.  There is a foreign body soft tissues below in between the 1st and 2nd metatarsals thought to be a shard of glass.  There may be soft tissue swelling.  No fracture dislocation bone destruction seen.                               X-Ray Tibia Fibula 2 View Right (Final result)  Result time 06/01/18 15:37:58    Final result by Abel Wise III, MD (06/01/18 15:37:58)                 Impression:      See above      Electronically signed by: Abel Wise MD  Date:    06/01/2018  Time:    15:37             Narrative:    EXAMINATION:  XR TIBIA FIBULA 2 VIEW RIGHT    FINDINGS:  No fracture dislocation bone destruction seen.  There are spurs on the patella and tibial tuberosity.                               X-Ray Ankle Complete Right (Final result)  Result time 06/01/18 15:38:12    Final result by Abel Wise III, MD (06/01/18 15:38:12)                 Impression:      See above      Electronically signed by: Abel Wise MD  Date:    06/01/2018  Time:    15:38             Narrative:    EXAMINATION:  XR ANKLE COMPLETE 3 VIEW RIGHT    FINDINGS:  There is DJD and spurs on the calcaneus.  No fracture dislocation bone destruction seen.                                  Assessment/Plan:     * Diabetic ulcer of right midfoot    Cellulitis  Foreign object (glass) in foot  - Suspect: R foot ulcer, cellulitis, possible osteomyelitis due to stepping on glass  - Lab/rad/micro: FU BCx, WBC ct  - Consult: Podiatry- pt has glass in foot via XR- will follow up removal surgery and deep cultures  - Treatment: Aztreonam, flagyl, daptomycin  - Status: currently patient is stable, continue to monitor              Leukocytosis    - Suspect this is due to glass lodged in patient's foot and probable infection, cellulitis, potential osteomyelitis  -See  above          Diarrhea    - likely due to antibiotics  - will test for c.diff considering pt was on ciprofloxacin          Type 2 diabetes mellitus 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, with long-term current use of insulin    -Patient with uncontrolled diabetes despite taking multiple medications at home with peripheral neuropathy  - Patient has 's on admit, anion gap 14  - Start treating with 80% home meds- 45 BB and 15 TID with meals. (home meds- 55u basal bolus, 20 prandial TIDWM, metformin, trulicity, glargine) will increase as needed  - Continue to monitor             Vomiting    - appears related to abx (cipro and clinda)- stopped those abx  - zofran as needed  - continue to monitor now that abx are changed          HTN (hypertension)    - Patient is asymptomatic and stable  - Treating with home meds- coreg, lisinopril  - Continue to monitor           CAD s/p PCI of LAD (SHELBY) in May 2013    - Patient on aspirin, atorvastatin  - asymptomatic          Dyslipidemia    - Treating with home meds- Atorvastatin          Colon cancer    - Malignant colon polyp removed endoscopically, no residual cancer s/p lap sigmoid colectomy- April 2017            VTE Risk Mitigation         Ordered     enoxaparin injection 40 mg  Daily      06/01/18 1542     IP VTE HIGH RISK PATIENT  Once      06/01/18 1542             Jah Gordon MD  Department of Hospital Medicine   [facility]

## 2018-06-02 NOTE — NURSING TRANSFER
Nursing Transfer Note      6/2/2018     Transfer To: [address] From PACU    Transfer via stretcher    Transfer with none    Transported by PCT    Medicines sent: none    Chart send with patient: Yes    Notified: family    Patient reassessed at: 6/2/18 @ 1240     Upon arrival to floor:

## 2018-06-03 PROBLEM — L08.9 RIGHT FOOT INFECTION: Status: ACTIVE | Noted: 2018-06-03

## 2018-06-03 LAB
ALBUMIN SERPL BCP-MCNC: 2.1 G/DL
ALP SERPL-CCNC: 89 U/L
ALT SERPL W/O P-5'-P-CCNC: 15 U/L
ANION GAP SERPL CALC-SCNC: 10 MMOL/L
AST SERPL-CCNC: 18 U/L
BASOPHILS # BLD AUTO: 0.04 K/UL
BASOPHILS NFR BLD: 0.3 %
BILIRUB SERPL-MCNC: 0.5 MG/DL
BUN SERPL-MCNC: 23 MG/DL
CALCIUM SERPL-MCNC: 8.3 MG/DL
CHLORIDE SERPL-SCNC: 99 MMOL/L
CK SERPL-CCNC: 175 U/L
CO2 SERPL-SCNC: 23 MMOL/L
CREAT SERPL-MCNC: 1 MG/DL
DIFFERENTIAL METHOD: ABNORMAL
EOSINOPHIL # BLD AUTO: 0 K/UL
EOSINOPHIL NFR BLD: 0.3 %
ERYTHROCYTE [DISTWIDTH] IN BLOOD BY AUTOMATED COUNT: 11.9 %
EST. GFR  (AFRICAN AMERICAN): >60 ML/MIN/1.73 M^2
EST. GFR  (NON AFRICAN AMERICAN): >60 ML/MIN/1.73 M^2
GLUCOSE SERPL-MCNC: 150 MG/DL
HCT VFR BLD AUTO: 35.2 %
HGB BLD-MCNC: 11.9 G/DL
IMM GRANULOCYTES # BLD AUTO: 0.12 K/UL
IMM GRANULOCYTES NFR BLD AUTO: 0.9 %
LYMPHOCYTES # BLD AUTO: 1.3 K/UL
LYMPHOCYTES NFR BLD: 9.4 %
MAGNESIUM SERPL-MCNC: 1.8 MG/DL
MCH RBC QN AUTO: 29.4 PG
MCHC RBC AUTO-ENTMCNC: 33.8 G/DL
MCV RBC AUTO: 87 FL
MONOCYTES # BLD AUTO: 1.8 K/UL
MONOCYTES NFR BLD: 13.2 %
NEUTROPHILS # BLD AUTO: 10.2 K/UL
NEUTROPHILS NFR BLD: 75.9 %
NRBC BLD-RTO: 0 /100 WBC
PHOSPHATE SERPL-MCNC: 4 MG/DL
PLATELET # BLD AUTO: 259 K/UL
PMV BLD AUTO: 9.2 FL
POCT GLUCOSE: 149 MG/DL (ref 70–110)
POCT GLUCOSE: 179 MG/DL (ref 70–110)
POCT GLUCOSE: 185 MG/DL (ref 70–110)
POTASSIUM SERPL-SCNC: 3.8 MMOL/L
PROT SERPL-MCNC: 6 G/DL
RBC # BLD AUTO: 4.05 M/UL
SODIUM SERPL-SCNC: 132 MMOL/L
WBC # BLD AUTO: 13.45 K/UL

## 2018-06-03 PROCEDURE — 11000001 HC ACUTE MED/SURG PRIVATE ROOM

## 2018-06-03 PROCEDURE — 25000003 PHARM REV CODE 250: Performed by: STUDENT IN AN ORGANIZED HEALTH CARE EDUCATION/TRAINING PROGRAM

## 2018-06-03 PROCEDURE — 80053 COMPREHEN METABOLIC PANEL: CPT

## 2018-06-03 PROCEDURE — 63600175 PHARM REV CODE 636 W HCPCS: Performed by: PHYSICIAN ASSISTANT

## 2018-06-03 PROCEDURE — 82550 ASSAY OF CK (CPK): CPT

## 2018-06-03 PROCEDURE — 36415 COLL VENOUS BLD VENIPUNCTURE: CPT

## 2018-06-03 PROCEDURE — 83735 ASSAY OF MAGNESIUM: CPT

## 2018-06-03 PROCEDURE — 84100 ASSAY OF PHOSPHORUS: CPT

## 2018-06-03 PROCEDURE — 99223 1ST HOSP IP/OBS HIGH 75: CPT | Mod: ,,, | Performed by: INTERNAL MEDICINE

## 2018-06-03 PROCEDURE — 99499 UNLISTED E&M SERVICE: CPT | Mod: ,,, | Performed by: PHYSICIAN ASSISTANT

## 2018-06-03 PROCEDURE — 94761 N-INVAS EAR/PLS OXIMETRY MLT: CPT

## 2018-06-03 PROCEDURE — 85025 COMPLETE CBC W/AUTO DIFF WBC: CPT

## 2018-06-03 PROCEDURE — S0073 INJECTION, AZTREONAM, 500 MG: HCPCS | Performed by: STUDENT IN AN ORGANIZED HEALTH CARE EDUCATION/TRAINING PROGRAM

## 2018-06-03 PROCEDURE — 99233 SBSQ HOSP IP/OBS HIGH 50: CPT | Mod: ,,, | Performed by: HOSPITALIST

## 2018-06-03 PROCEDURE — 63600175 PHARM REV CODE 636 W HCPCS: Performed by: STUDENT IN AN ORGANIZED HEALTH CARE EDUCATION/TRAINING PROGRAM

## 2018-06-03 RX ORDER — CEFAZOLIN SODIUM 1 G/3ML
1 INJECTION, POWDER, FOR SOLUTION INTRAMUSCULAR; INTRAVENOUS
Status: DISCONTINUED | OUTPATIENT
Start: 2018-06-03 | End: 2018-06-04

## 2018-06-03 RX ORDER — CEFAZOLIN SODIUM 1 G/3ML
1 INJECTION, POWDER, FOR SOLUTION INTRAMUSCULAR; INTRAVENOUS ONCE
Status: COMPLETED | OUTPATIENT
Start: 2018-06-03 | End: 2018-06-03

## 2018-06-03 RX ADMIN — AZTREONAM 2000 MG: 2 INJECTION, POWDER, LYOPHILIZED, FOR SOLUTION INTRAMUSCULAR; INTRAVENOUS at 04:06

## 2018-06-03 RX ADMIN — CARVEDILOL 12.5 MG: 12.5 TABLET, FILM COATED ORAL at 08:06

## 2018-06-03 RX ADMIN — INSULIN DETEMIR 45 UNITS: 100 INJECTION, SOLUTION SUBCUTANEOUS at 09:06

## 2018-06-03 RX ADMIN — INSULIN ASPART 15 UNITS: 100 INJECTION, SOLUTION INTRAVENOUS; SUBCUTANEOUS at 05:06

## 2018-06-03 RX ADMIN — OXYCODONE HYDROCHLORIDE 5 MG: 5 TABLET ORAL at 01:06

## 2018-06-03 RX ADMIN — ATORVASTATIN CALCIUM 80 MG: 20 TABLET, FILM COATED ORAL at 08:06

## 2018-06-03 RX ADMIN — CARVEDILOL 12.5 MG: 12.5 TABLET, FILM COATED ORAL at 09:06

## 2018-06-03 RX ADMIN — METRONIDAZOLE 500 MG: 500 TABLET ORAL at 06:06

## 2018-06-03 RX ADMIN — ENOXAPARIN SODIUM 40 MG: 100 INJECTION SUBCUTANEOUS at 04:06

## 2018-06-03 RX ADMIN — CEFAZOLIN 1 G: 330 INJECTION, POWDER, FOR SOLUTION INTRAMUSCULAR; INTRAVENOUS at 01:06

## 2018-06-03 RX ADMIN — OXYCODONE HYDROCHLORIDE 5 MG: 5 TABLET ORAL at 09:06

## 2018-06-03 RX ADMIN — INSULIN ASPART 15 UNITS: 100 INJECTION, SOLUTION INTRAVENOUS; SUBCUTANEOUS at 01:06

## 2018-06-03 RX ADMIN — ACETAMINOPHEN 650 MG: 325 TABLET ORAL at 04:06

## 2018-06-03 RX ADMIN — OXYCODONE HYDROCHLORIDE 5 MG: 5 TABLET ORAL at 07:06

## 2018-06-03 RX ADMIN — CEFAZOLIN 1 G: 1 INJECTION, POWDER, FOR SOLUTION INTRAMUSCULAR; INTRAVENOUS at 09:06

## 2018-06-03 RX ADMIN — INSULIN ASPART 15 UNITS: 100 INJECTION, SOLUTION INTRAVENOUS; SUBCUTANEOUS at 07:06

## 2018-06-03 RX ADMIN — LISINOPRIL 40 MG: 20 TABLET ORAL at 08:06

## 2018-06-03 NOTE — CONSULTS
[facility]  Infectious Disease  Consult Note    Patient Name: Billy Sheffield Miguel  MRN: [id number]  Admission Date: 6/1/2018  Hospital Length of Stay: 2 days  Attending Physician: Josiah Sawyer, *  Primary Care Provider: BRAD Baker MD       Inpatient consult to Infectious Diseases  Consult performed by: CIELO ALMAZAN  Consult ordered by: JARAD MORAN consult received. Chart being reviewed. Full consult note with recommendations to follow.      Thank you,  Cielo Almazan PA-C  [phone]

## 2018-06-03 NOTE — ASSESSMENT & PLAN NOTE
Cellulitis  Foreign object (glass) in foot  - Suspect: R foot ulcer, cellulitis, possible osteomyelitis due to stepping on glass  - Lab/rad/micro: BCx NGTD, WBC ct- improving  - Consult: Podiatry- foreign body removed  - Treatment: Aztreonam, flagyl, daptomycin  - Status: currently patient is stable, continue to monitor  - ID consulted- FU recs for ABX

## 2018-06-03 NOTE — PLAN OF CARE
Problem: Patient Care Overview  Goal: Plan of Care Review  Outcome: Ongoing (interventions implemented as appropriate)  Pt is AAOX4. I&D done today. Pt came back with drsg in place to right foot. US of LE done with VASILIY. accuchecks q 6 while pt was NPO but changed to AC/HS after diet order was placed. Denies pain. Has PRN oxy for pain. Uses urinal to  Void. No bm. Wife at bedside. Reviewed POC with pt. IV abx and also PO abx given to pt. Call light in reach.

## 2018-06-03 NOTE — PROGRESS NOTES
[facility]  Podiatry  Progress Note    Patient Name: Billy Sheffield Miguel  MRN: [id number]  Admission Date: 6/1/2018  Hospital Length of Stay: 2 days  Attending Physician: Josiah Sawyer, *  Primary Care Provider: BRAD Baker MD   Interval Hx: S/p one day right foot I&D. Reports pain improved well controlled with pain meds. Xrays reviewed noting residual foreign body in foot.     Scheduled Meds:   atorvastatin  80 mg Oral Daily    carvedilol  12.5 mg Oral BID    ceFAZolin (ANCEF) IVPB  1 g Intravenous Q8H    enoxaparin  40 mg Subcutaneous Daily    insulin aspart U-100  15 Units Subcutaneous TIDWM    insulin detemir U-100  45 Units Subcutaneous QHS    lisinopril  40 mg Oral Daily     Continuous Infusions:  PRN Meds:acetaminophen, dextrose 50%, dextrose 50%, glucagon (human recombinant), glucose, glucose, insulin aspart U-100, ondansetron, oxyCODONE, sodium chloride 0.9%, sodium chloride 0.9%    Review of patient's allergies indicates:   Allergen Reactions    Penicillins Anaphylaxis    Shellfish containing products      Other reaction(s): Unknown    Vancomycin Itching    Bactrim  [sulfamethoxazole-trimethoprim] Rash        Past Medical History:   Diagnosis Date    Allergy     CAD (coronary artery disease), native coronary artery 6/25/2013    Cancer     Diabetes mellitus     Diabetes mellitus, type 2     Disorder of kidney and ureter     Heart attack 04/2012    Hyperlipidemia     Hypertension     Muscular pain     post-op after colonoscopy     Past Surgical History:   Procedure Laterality Date    COLONOSCOPY N/A 2/17/2017    Procedure: COLONOSCOPY;  Surgeon: Simon Gomez MD;  Location: [address];  Service: Endoscopy;  Laterality: N/A;    CORONARY ANGIOPLASTY      TONSILLECTOMY         Family History     Problem Relation (Age of Onset)    Heart disease Mother, Brother        Social History Main Topics    Smoking status: Never Smoker    Smokeless tobacco:  Never Used    Alcohol use Yes      Comment: rare x1 beer-     Drug use: No    Sexual activity: Not Currently     Review of Systems   Constitutional: Positive for activity change.   Respiratory: Negative for shortness of breath.    Cardiovascular: Negative for chest pain and leg swelling.   Gastrointestinal: Negative for nausea and vomiting.   Genitourinary: Negative.    Musculoskeletal: Positive for arthralgias.   Skin: Positive for wound.   Neurological: Negative for weakness and numbness.   Psychiatric/Behavioral: Negative.      Objective:     Vital Signs (Most Recent):  Temp: 98.6 °F (37 °C) (06/03/18 1302)  Pulse: 70 (06/03/18 1302)  Resp: 18 (06/03/18 1302)  BP: 114/62 (06/03/18 1302)  SpO2: (!) 92 % (06/03/18 1400) Vital Signs (24h Range):  Temp:  [97.9 °F (36.6 °C)-101.9 °F (38.8 °C)] 98.6 °F (37 °C)  Pulse:  [65-92] 70  Resp:  [14-18] 18  SpO2:  [92 %-98 %] 92 %  BP: (111-142)/(56-76) 114/62     Weight: 122.5 kg (270 lb)  Body mass index is 35.62 kg/m².    Foot Exam    General  Orientation: alert and oriented to person, place, and time       Right Foot/Ankle     Inspection and Palpation  Tenderness: metatarsals   Swelling: metatarsals   Skin Exam: ulcer;     Neurovascular  Dorsalis pedis: 1+  Posterior tibial: 1+      Left Foot/Ankle      Inspection and Palpation  Tenderness: none   Swelling: none   Skin Exam: erythema;     Neurovascular  Dorsalis pedis: 1+  Posterior tibial: 1+          6/3/18    Wound 1:Plantar right foot   Measurement: 5cmx0.2cmx0.5cm  Base: fibrous   Periwound skin: macerated tissue - improved  Drainage: serous  Erythema: moderate- improved  Probe: negative probe to bone.                                   Laboratory:  CBC:     Recent Labs  Lab 06/03/18  0443   WBC 13.45*   RBC 4.05*   HGB 11.9*   HCT 35.2*      MCV 87   MCH 29.4   MCHC 33.8     CMP:     Recent Labs  Lab 06/03/18 0443   *   CALCIUM 8.3*   ALBUMIN 2.1*   PROT 6.0   *   K 3.8   CO2 23   CL 99   BUN  23*   CREATININE 1.0   ALKPHOS 89   ALT 15   AST 18   BILITOT 0.5       Diagnostic Results:  Xray: There is DJD and spurs on the calcaneus.  There is a foreign body soft tissues below in between the 1st and 2nd metatarsals thought to be a shard of glass.  There may be soft tissue swelling.  No fracture dislocation bone destruction seen.    MRI: No MR evidence of osteomyelitis.    Diffuse subcutaneous and myofascial forefoot edema with multiple plantar skin defects, overall, concerning for cellulitis/myositis.  Associated radiopaque foreign body likely reflecting glass is more conspicuous on comparison foot radiograph.    Nonspecific dermal fluid collection along the plantar aspect of the 1st and 2nd metatarsophalangeal joints, possibly a blister.  An infected fluid collection cannot be excluded.  Suggest correlation with direct visualization and clinical findings.    Clinical Findings:  S/p one day right foot I&D. Surgical site stable with serous drainage. Pain upon palpation right foot.     Assessment/Plan:     * Diabetic ulcer of right midfoot    S/p one day right foot I&D, surgical site with serous drainage.   Repeat xray right foot ordered, reviewed noting residual foreign body right foot.   Will likely require repeat I&D with application of wound VAC will discuss with staff.   Abx per ID appreciate assistance.   Podiatry will follow      Weightbearing Status: Partial heel WB right   Offloading Device: DARCO shoe     Tonya Valle DPM PGY-3  Pager: [phone]              Right foot infection    Per above         Type 2 diabetes mellitus 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, with long-term current use of insulin    Per primary             Tonya Valle MD  Podiatry  [facility]

## 2018-06-03 NOTE — ASSESSMENT & PLAN NOTE
S/p one day right foot I&D, surgical site with serous drainage.   Repeat xray right foot ordered, reviewed noting residual foreign body right foot.   Will likely require repeat I&D with application of wound VAC will discuss with staff.   Abx per ID appreciate assistance.   Podiatry will follow      Weightbearing Status: Partial heel WB right   Offloading Device: DARCO shoe     Tonya Valle DPM PGY-3  Pager: [phone]

## 2018-06-03 NOTE — HPI
Mr. Rivera is a 52-year-old male admitted with right foot infection. Patient states that he stepped on a piece of glass at work that penetrated through his shoe on May 19.  At that time he removed the glass, used antiseptic and antibacterial on his foot and felt that things were ok. He began having issues on May 26, when he noted that a large blister developed on his plantar surface near the first digit. No fevers, chills or sweats at home. On the 29th he was seen by his PCP and started on Cipro and Clindamycin. Patient notes that the clinda and Cipro gave him diarrhea, severe nausea and vomiting. Unfortunately his symptoms did not improve on antibiotics alone prompting him to come to the ED.     In the ED patient was noted to have a WBC of 15K, , , procalc 0.29. MRI showed diffuse subcutaneous and myofascial forefoot edema, fluid collection along the plantar aspect of the 1st and 2nd metatarsophalangeal joints with associated radiopaque foreign body likely reflecting glass.  No osteomyelitis. He was started on Dapto, Flagyl and Aztreonam given documented PCN allergy. He was taken to the OR yesterday for washout with podiatry. Copious amounts of purulent drainage noted from ulceration with tracking noted to dorsal foot at 1st interdigital space. Surgical cultures are growing GBS. He had an isolated fever post op. WBC is trending down. He is seen at bedside and feels well today. Pain controlled. Patient denies current fevers, chills, sweats, dysuria, rash, shortness of breath, chest pain, abdominal pain. Diarrhea and nausea resolved. Of note, discussed his PCN allergy and patient states that his reaction was as a child and he does not remember the episode. His mother told him he went into a coma after receiving IM PCN. He denies being told of any anaphylactic like reactions. He is unaware if he has ever been treated with a cephalosporin.

## 2018-06-03 NOTE — ASSESSMENT & PLAN NOTE
52-year-old male admitted with right foot infection after penetrating glass trauma that failed outpatient antibiotics alone. In the ED patient was noted to have a WBC of 15K, , . MRI showed diffuse subcutaneous and myofascial forefoot edema, fluid collection along the plantar aspect of the 1st and 2nd metatarsophalangeal joints with associated radiopaque foreign body likely reflecting glass. No osteomyelitis. He was started on Dapto, Flagyl and Aztreonam given documented PCN allergy. He was taken to the OR yesterday for washout with podiatry. Copious amounts of purulent drainage noted with tracking no to dorsal foot at 1st interdigital space. Surgical cultures are growing GBS. He had an isolated fever post op. Otherwise afebrile. WBC is trending down. Doing well today. Clinically stable/non septic.    Discussed his documented PCN allergy. States he was told by his mother that he went into a possible coma after receiving IM Penicillin as a child. Does not remember the event. Denies known history of anaphylactic type reactions. He was informed penicillins are the drug of choice for GBS. Given he is in a monitored setting, he is willing to try a cephalosporin.       Plan  - Discontinue Daptomycin  - After speaking with the patient, his childhood reaction to PCN did not have features consistent with an IgE-mediated reaction. Patient is willing to try a cephalosporin. Will order a one time dose of Cefazolin 1 g IV. Please monitor closely for any adverse reactions. Discussed this with the patient along with patient's nurse.   - Will follow surgical cultures to guide antibiotic therapy.   - As his foot has been washed out and there is no evidence of osteomyelitis, can likely discharge on oral antibiotics.  - Recommend Allergy follow up as an outpatient for PCN skin testing   - ID will follow.

## 2018-06-03 NOTE — SUBJECTIVE & OBJECTIVE
Interval History: See hospital course above.     Review of Systems   Constitutional: Negative for chills, fever and unexpected weight change.   HENT: Negative for hearing loss.    Eyes: Negative for visual disturbance.   Respiratory: Negative for shortness of breath.    Cardiovascular: Negative for chest pain and leg swelling.   Gastrointestinal: Negative for abdominal pain, constipation, diarrhea, nausea and vomiting.   Genitourinary: Negative for dysuria and hematuria.   Musculoskeletal: Negative for arthralgias.   Skin: Positive for color change, rash and wound.   Neurological: Negative for seizures and weakness.   Hematological: Negative for adenopathy. Does not bruise/bleed easily.     Objective:     Vital Signs (Most Recent):  Temp: 99.7 °F (37.6 °C) (06/03/18 0754)  Pulse: 67 (06/03/18 0754)  Resp: 14 (06/03/18 0754)  BP: 132/75 (06/03/18 0754)  SpO2: (!) 92 % (06/03/18 0754) Vital Signs (24h Range):  Temp:  [97.7 °F (36.5 °C)-101.9 °F (38.8 °C)] 99.7 °F (37.6 °C)  Pulse:  [58-92] 67  Resp:  [14-19] 14  SpO2:  [92 %-100 %] 92 %  BP: (103-142)/(56-85) 132/75     Weight: 122.5 kg (270 lb)  Body mass index is 35.62 kg/m².    Intake/Output Summary (Last 24 hours) at 06/03/18 1056  Last data filed at 06/03/18 0600   Gross per 24 hour   Intake             1300 ml   Output             1210 ml   Net               90 ml      Physical Exam   Constitutional: He is oriented to person, place, and time. He appears well-developed and well-nourished. No distress.   HENT:   Head: Normocephalic and atraumatic.   Eyes: EOM are normal. Pupils are equal, round, and reactive to light.   Neck: No tracheal deviation present. No thyromegaly present.   Cardiovascular: Normal rate and regular rhythm.    No murmur heard.  Pulmonary/Chest: Effort normal and breath sounds normal. He has no wheezes. He has no rales.   Abdominal: Soft. He exhibits no mass. There is no tenderness. No hernia.   Musculoskeletal: He exhibits edema and  tenderness.   Neurological: He is alert and oriented to person, place, and time. No cranial nerve deficit. He exhibits normal muscle tone.   Skin: Skin is warm. Rash noted. He is not diaphoretic. There is erythema.   Psychiatric: He has a normal mood and affect.   Vitals reviewed.      Significant Labs:   CBC:     Recent Labs  Lab 06/02/18  0025 06/02/18  0506 06/03/18  0443   WBC 13.51* 13.76* 13.45*   HGB 12.4* 13.3* 11.9*   HCT 36.5* 38.6* 35.2*    252 259     CMP:     Recent Labs  Lab 06/02/18  0025 06/02/18  0506 06/03/18  0443   * 132* 132*   K 3.9 4.0 3.8   CL 98 98 99   CO2 22* 24 23   * 239* 150*   BUN 19 17 23*   CREATININE 1.1 1.0 1.0   CALCIUM 8.7 8.8 8.3*   PROT 6.3 6.5 6.0   ALBUMIN 2.4* 2.4* 2.1*   BILITOT 0.8 0.8 0.5   ALKPHOS 96 112 89   AST 12 14 18   ALT 16 17 15   ANIONGAP 11 10 10   EGFRNONAA >60.0 >60.0 >60.0       Significant Imaging: I have reviewed all pertinent imaging results/findings within the past 24 hours.

## 2018-06-03 NOTE — SUBJECTIVE & OBJECTIVE
Past Medical History:   Diagnosis Date    Allergy     CAD (coronary artery disease), native coronary artery 6/25/2013    Cancer     Diabetes mellitus     Diabetes mellitus, type 2     Disorder of kidney and ureter     Heart attack 04/2012    Hyperlipidemia     Hypertension     Muscular pain     post-op after colonoscopy       Past Surgical History:   Procedure Laterality Date    COLONOSCOPY N/A 2/17/2017    Procedure: COLONOSCOPY;  Surgeon: Simon Gomez MD;  Location: [address]);  Service: Endoscopy;  Laterality: N/A;    CORONARY ANGIOPLASTY      TONSILLECTOMY         Review of patient's allergies indicates:   Allergen Reactions    Penicillins Anaphylaxis    Shellfish containing products      Other reaction(s): Unknown    Vancomycin Itching    Bactrim  [sulfamethoxazole-trimethoprim] Rash       Medications:  Prescriptions Prior to Admission   Medication Sig    aspirin 81 MG Chew Take 81 mg by mouth every evening. swallows    atorvastatin (LIPITOR) 80 MG tablet Take 1 tablet (80 mg total) by mouth once daily.    blood sugar diagnostic Strp Strips and lancets    Use before meals and bedtime    carvedilol (COREG) 12.5 MG tablet Take 1 tablet (12.5 mg total) by mouth 2 (two) times daily.    ciprofloxacin HCl (CIPRO) 500 MG tablet Take 1 tablet (500 mg total) by mouth every 12 (twelve) hours.    clindamycin (CLEOCIN) 300 MG capsule Take 1 capsule (300 mg total) by mouth every 6 (six) hours.    dulaglutide (TRULICITY) 1.5 mg/0.5 mL PnIj Inject 1.5 mg into the skin every 7 days.    ibuprofen (ADVIL,MOTRIN) 800 MG tablet Take 1 tablet (800 mg total) by mouth 3 (three) times daily as needed for Pain.    insulin glargine (LANTUS SOLOSTAR) 100 unit/mL (3 mL) InPn pen INJECT 55 UNITS SUBCUTANEOUSLY IN THE EVENING    insulin lispro (HUMALOG) 100 unit/mL injection Inject 20 Units into the skin 3 (three) times daily before meals.    lisinopril (PRINIVIL,ZESTRIL) 40 MG tablet Take 1 tablet (40  "mg total) by mouth once daily.    metFORMIN (GLUCOPHAGE) 1000 MG tablet Take 1 tablet (1,000 mg total) by mouth 2 (two) times daily with meals.    nitroGLYCERIN (NITROSTAT) 0.4 MG SL tablet Place 1 tablet (0.4 mg total) under the tongue every 5 (five) minutes as needed for Chest pain.    pen needle, diabetic 31 gauge x 3/16" Ndle Inject 1 each into the skin 3 (three) times daily before meals.     Antibiotics     Start     Stop Route Frequency Ordered    06/01/18 1854  DAPTOmycin (CUBICIN) 735 mg in sodium chloride 0.9% IVPB      -- IV Every 24 hours (non-standard times) 06/01/18 1854        Antifungals     None        Antivirals     None           Immunization History   Administered Date(s) Administered    Influenza - Intradermal - Quadrivalent - PF 10/24/2013    Pneumococcal Polysaccharide - 23 Valent 06/29/2017    Tdap 09/08/2015       Family History     Problem Relation (Age of Onset)    Heart disease Mother, Brother        Social History     Social History    Marital status:      Spouse name: N/A    Number of children: N/A    Years of education: N/A     Social History Main Topics    Smoking status: Never Smoker    Smokeless tobacco: Never Used    Alcohol use Yes      Comment: rare x1 beer-     Drug use: No    Sexual activity: Not Currently     Other Topics Concern    None     Social History Narrative    None     Review of Systems   Constitutional: Negative for chills, diaphoresis, fatigue and fever.   HENT: Negative for congestion, rhinorrhea and sore throat.    Eyes: Negative for visual disturbance.   Respiratory: Negative for cough, chest tightness and shortness of breath.    Cardiovascular: Positive for leg swelling. Negative for chest pain.   Gastrointestinal: Negative for abdominal pain, constipation, diarrhea, nausea and vomiting.   Genitourinary: Negative for difficulty urinating, dysuria, frequency and hematuria.   Musculoskeletal: Positive for arthralgias (right foot) and joint " swelling. Negative for back pain and neck pain.   Skin: Positive for color change and wound. Negative for rash.   Allergic/Immunologic: Negative for immunocompromised state.   Neurological: Negative for seizures, weakness and headaches.   Hematological: Negative for adenopathy.   Psychiatric/Behavioral: Negative for confusion. The patient is not nervous/anxious.    All other systems reviewed and are negative.    Objective:     Vital Signs (Most Recent):  Temp: 99.7 °F (37.6 °C) (06/03/18 0754)  Pulse: 67 (06/03/18 0754)  Resp: 14 (06/03/18 0754)  BP: 132/75 (06/03/18 0754)  SpO2: (!) 92 % (06/03/18 0754) Vital Signs (24h Range):  Temp:  [97.9 °F (36.6 °C)-101.9 °F (38.8 °C)] 99.7 °F (37.6 °C)  Pulse:  [65-92] 67  Resp:  [14-18] 14  SpO2:  [92 %-98 %] 92 %  BP: (111-142)/(56-85) 132/75     Weight: 122.5 kg (270 lb)  Body mass index is 35.62 kg/m².    Estimated Creatinine Clearance: 117.1 mL/min (based on SCr of 1 mg/dL).    Physical Exam   Constitutional: He is oriented to person, place, and time. He appears well-developed and well-nourished. No distress.   HENT:   Head: Normocephalic and atraumatic.   Eyes: Conjunctivae are normal. No scleral icterus.   Cardiovascular: Normal rate, regular rhythm and intact distal pulses.    No murmur heard.  Pulmonary/Chest: Effort normal and breath sounds normal. No respiratory distress. He has no wheezes.   Abdominal: Soft. Bowel sounds are normal. He exhibits no distension. There is no tenderness. There is no guarding.   Musculoskeletal: He exhibits edema and tenderness (right foot).   Neurological: He is alert and oriented to person, place, and time.   Skin: Skin is warm and dry. He is not diaphoretic. There is erythema.   Right foot dressed. Dressing c/d/i. Cellulitis to RLE but improved. TTP.    Psychiatric: He has a normal mood and affect. His behavior is normal. Thought content normal.       Significant Labs:   Blood Culture:   Recent Labs  Lab 06/01/18  1445 06/02/18  7737  06/02/18  2115   LABBLOO No Growth to date  No Growth to date No Growth to date No Growth to date     CBC:   Recent Labs  Lab 06/02/18  0025 06/02/18  0506 06/03/18  0443   WBC 13.51* 13.76* 13.45*   HGB 12.4* 13.3* 11.9*   HCT 36.5* 38.6* 35.2*    252 259     CMP:   Recent Labs  Lab 06/02/18  0025 06/02/18  0506 06/03/18  0443   * 132* 132*   K 3.9 4.0 3.8   CL 98 98 99   CO2 22* 24 23   * 239* 150*   BUN 19 17 23*   CREATININE 1.1 1.0 1.0   CALCIUM 8.7 8.8 8.3*   PROT 6.3 6.5 6.0   ALBUMIN 2.4* 2.4* 2.1*   BILITOT 0.8 0.8 0.5   ALKPHOS 96 112 89   AST 12 14 18   ALT 16 17 15   ANIONGAP 11 10 10   EGFRNONAA >60.0 >60.0 >60.0     Procalcitonin:   Recent Labs  Lab 06/02/18 2111   PROCAL 0.29*     Respiratory Culture: No results for input(s): GSRESP, RESPIRATORYC in the last 4320 hours.  Urine Culture: No results for input(s): LABURIN in the last 4320 hours.  Urine Studies: No results for input(s): COLORU, APPEARANCEUA, PHUR, SPECGRAV, PROTEINUA, GLUCUA, KETONESU, BILIRUBINUA, OCCULTUA, NITRITE, UROBILINOGEN, LEUKOCYTESUR, RBCUA, WBCUA, BACTERIA, SQUAMEPITHEL, HYALINECASTS in the last 4320 hours.    Invalid input(s): WRIGHTSUR  Wound Culture:   Recent Labs  Lab 06/02/18  1218 06/02/18  1226   LABAERO STREPTOCOCCUS AGALACTIAE (GROUP B)ManyBeta-hemolytic streptococci are routinely susceptible to penicillins,cephalosporins and carbapenems.Susceptibility testing not routinely performed STREPTOCOCCUS AGALACTIAE (GROUP B)ManyBeta-hemolytic streptococci are routinely susceptible to penicillins,cephalosporins and carbapenems.Susceptibility testing not routinely performed       Significant Imaging: I have reviewed all pertinent imaging results/findings within the past 24 hours.

## 2018-06-03 NOTE — PLAN OF CARE
Problem: Patient Care Overview  Goal: Plan of Care Review  Outcome: Ongoing (interventions implemented as appropriate)  Pt is AAOX4. Adjustments were made to abx. Rocephin was given. MD questioned possible adverse reaction to the med and to monitor closely, pt had no side effects, tolerated very well. Pt stated to this nurse that he is getting more of an urge to void and his appetite back. Wife at bedside. Ambulated up in room this shift. Pt has intermittent low grade fevers that is controlled with tylenol. Prn oxy is effective for pain control. Call light in reach.

## 2018-06-03 NOTE — ASSESSMENT & PLAN NOTE
- Suspect this is due to glass lodged in patient's foot and probable infection, cellulitis, potential osteomyelitis  -See above  - foreign body removed

## 2018-06-03 NOTE — PROGRESS NOTES
[facility] Hospital Medicine  Progress Note    Patient Name: Billy Sheffield Miguel  MRN: [id number]  Patient Class: IP- Inpatient   Admission Date: 6/1/2018  Length of Stay: 2 days  Attending Physician: Josiah Sawyer, *  Primary Care Provider: BRAD Baker MD    [facility] Medicine Team: [facility] 2 Jah Gordon MD    Subjective:     Principal Problem:Diabetic ulcer of right midfoot    HPI:  Patient is a 52-year-old male with HTN, MI 2013 s/p 1 stent, DMT2, colon cancer s/p resection 2017 who presents after failing outpatient treatment for a right ulcer.  Patient states that he stepped on a piece of glass at work where he is  on the 19th of May and it went through his shoe. At that time he removed the glass, used antiseptic and antibacterial on his foot and felt that things are going OK. He didn't have any issues until last Saturday The 26th, when he noted that a puffy cherry blister was occurring on his plantar surface near the first digit. On Tuesday, the 29th he presented to his primary care physician in [place] where he was started on clinda and cipro. Patient notes that the clinda and Cipro gave him diarrhea and vomiting. He stated that he had diarrhea three times a day with some form, mostly liquid, no blood and it has been the same since it started. His vomiting occurred five times yesterday, and was brownish with food. His last diarrhea episode was at 10:30 this morning, and vomiting at 4:30 in the afternoon. The patient presented to his outside PCP today, without relief of symptoms on antibiotics. Patient denies fever, chills, nausea, vomiting, diarrhea, dysuria, rash, shortness of breath, chest pain, abdominal pain.  Patient states that his diabetes started 15 to 20 years ago, and has been reasonably well controlled since then with no neuropathy, optic issues or kidney issues. Patient notes that he had a stent in 2013 for coronary artery disease, and colon cancer  that was resected in April 2017 without any issues on follow up.    Hospital Course:  6/1/2018- admission date. Aztreonam, Flagyl, Daptomycin started. Podiatry consulted for AM.  6/2/2018- NAEON. No diarrhea anymore, unlikely C. Diff, tolerating meds well. Podiatry to OR today.  6/3/2018- Pt with fever of 101.9 overnight. ID consulted, strep agalactiae ordered.     Interval History: See hospital course above.     Review of Systems   Constitutional: Negative for chills, fever and unexpected weight change.   HENT: Negative for hearing loss.    Eyes: Negative for visual disturbance.   Respiratory: Negative for shortness of breath.    Cardiovascular: Negative for chest pain and leg swelling.   Gastrointestinal: Negative for abdominal pain, constipation, diarrhea, nausea and vomiting.   Genitourinary: Negative for dysuria and hematuria.   Musculoskeletal: Negative for arthralgias.   Skin: Positive for color change, rash and wound.   Neurological: Negative for seizures and weakness.   Hematological: Negative for adenopathy. Does not bruise/bleed easily.     Objective:     Vital Signs (Most Recent):  Temp: 99.7 °F (37.6 °C) (06/03/18 0754)  Pulse: 67 (06/03/18 0754)  Resp: 14 (06/03/18 0754)  BP: 132/75 (06/03/18 0754)  SpO2: (!) 92 % (06/03/18 0754) Vital Signs (24h Range):  Temp:  [97.7 °F (36.5 °C)-101.9 °F (38.8 °C)] 99.7 °F (37.6 °C)  Pulse:  [58-92] 67  Resp:  [14-19] 14  SpO2:  [92 %-100 %] 92 %  BP: (103-142)/(56-85) 132/75     Weight: 122.5 kg (270 lb)  Body mass index is 35.62 kg/m².    Intake/Output Summary (Last 24 hours) at 06/03/18 1056  Last data filed at 06/03/18 0600   Gross per 24 hour   Intake             1300 ml   Output             1210 ml   Net               90 ml      Physical Exam   Constitutional: He is oriented to person, place, and time. He appears well-developed and well-nourished. No distress.   HENT:   Head: Normocephalic and atraumatic.   Eyes: EOM are normal. Pupils are equal, round, and  reactive to light.   Neck: No tracheal deviation present. No thyromegaly present.   Cardiovascular: Normal rate and regular rhythm.    No murmur heard.  Pulmonary/Chest: Effort normal and breath sounds normal. He has no wheezes. He has no rales.   Abdominal: Soft. He exhibits no mass. There is no tenderness. No hernia.   Musculoskeletal: He exhibits edema and tenderness.   Neurological: He is alert and oriented to person, place, and time. No cranial nerve deficit. He exhibits normal muscle tone.   Skin: Skin is warm. Rash noted. He is not diaphoretic. There is erythema.   Psychiatric: He has a normal mood and affect.   Vitals reviewed.      Significant Labs:   CBC:     Recent Labs  Lab 06/02/18  0025 06/02/18  0506 06/03/18  0443   WBC 13.51* 13.76* 13.45*   HGB 12.4* 13.3* 11.9*   HCT 36.5* 38.6* 35.2*    252 259     CMP:     Recent Labs  Lab 06/02/18  0025 06/02/18  0506 06/03/18  0443   * 132* 132*   K 3.9 4.0 3.8   CL 98 98 99   CO2 22* 24 23   * 239* 150*   BUN 19 17 23*   CREATININE 1.1 1.0 1.0   CALCIUM 8.7 8.8 8.3*   PROT 6.3 6.5 6.0   ALBUMIN 2.4* 2.4* 2.1*   BILITOT 0.8 0.8 0.5   ALKPHOS 96 112 89   AST 12 14 18   ALT 16 17 15   ANIONGAP 11 10 10   EGFRNONAA >60.0 >60.0 >60.0       Significant Imaging: I have reviewed all pertinent imaging results/findings within the past 24 hours.    Assessment/Plan:      * Diabetic ulcer of right midfoot    Cellulitis  Foreign object (glass) in foot  - Suspect: R foot ulcer, cellulitis, possible osteomyelitis due to stepping on glass  - Lab/rad/micro: BCx NGTD, WBC ct- improving  - Consult: Podiatry- foreign body removed  - Treatment: Aztreonam, flagyl, daptomycin  - Status: currently patient is stable, continue to monitor  - ID consulted- FU recs for ABX            Leukocytosis    - Suspect this is due to glass lodged in patient's foot and probable infection, cellulitis, potential osteomyelitis  -See above  - foreign body removed        Type 2  diabetes mellitus 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, with long-term current use of insulin    -Patient with uncontrolled diabetes despite taking multiple medications at home with peripheral neuropathy  - Patient has 's on admit, anion gap 14  - Start treating with 80% home meds- 45 BB and 15 TID with meals. (home meds- 55u basal bolus, 20 prandial TIDWM, metformin, trulicity, glargine) will increase as needed  - Continue to monitor             HTN (hypertension)    - Patient is asymptomatic and stable  - Treating with home meds- coreg, lisinopril  - Continue to monitor           CAD s/p PCI of LAD (SHELBY) in May 2013    - Patient on aspirin, atorvastatin  - asymptomatic          Dyslipidemia    - Treating with home meds- Atorvastatin          Colon cancer    - Malignant colon polyp removed endoscopically, no residual cancer s/p lap sigmoid colectomy- April 2017            VTE Risk Mitigation         Ordered     IP VTE HIGH RISK PATIENT  Once      06/02/18 0504     enoxaparin injection 40 mg  Daily      06/01/18 8592              Jah Gordon MD  Department of Hospital Medicine   [facility]

## 2018-06-03 NOTE — CONSULTS
[facility]  Infectious Disease  Consult Note    Patient Name: Billy Sheffield Miguel  MRN: [id number]  Admission Date: 6/1/2018  Hospital Length of Stay: 2 days  Attending Physician: Josiah Sawyer, *  Primary Care Provider: BRAD Baker MD     Isolation Status: No active isolations    Patient information was obtained from patient and past medical records.      Consults  Assessment/Plan:     Right foot infection    52-year-old male admitted with right foot infection after penetrating glass trauma that failed outpatient antibiotics alone. In the ED patient was noted to have a WBC of 15K, , . MRI showed diffuse subcutaneous and myofascial forefoot edema, fluid collection along the plantar aspect of the 1st and 2nd metatarsophalangeal joints with associated radiopaque foreign body likely reflecting glass. No osteomyelitis. He was started on Dapto, Flagyl and Aztreonam given documented PCN allergy. He was taken to the OR yesterday for washout with podiatry. Copious amounts of purulent drainage noted with tracking no to dorsal foot at 1st interdigital space. Surgical cultures are growing GBS. He had an isolated fever post op. Otherwise afebrile. WBC is trending down. Doing well today. Clinically stable/non septic.    Discussed his documented PCN allergy. States he was told by his mother that he went into a possible coma after receiving IM Penicillin as a child. Does not remember the event. Denies known history of anaphylactic type reactions. He was informed penicillins are the drug of choice for GBS. Given he is in a monitored setting, he is willing to try a cephalosporin.       Plan  - Discontinue Daptomycin  - After speaking with the patient, his childhood reaction to PCN did not have features consistent with an IgE-mediated reaction. Patient is willing to try a cephalosporin. Will order a one time dose of Cefazolin 1 g IV. Please monitor closely for any adverse reactions.  Discussed this with the patient along with patient's nurse.   - Will follow surgical cultures to guide antibiotic therapy.   - As his foot has been washed out and there is no evidence of osteomyelitis, can likely discharge on oral antibiotics.  - Recommend Allergy follow up as an outpatient for PCN skin testing   - ID will follow.             Thank you for the consult. Please call for any questions.  Cielo Almazan PA-C  Phone: [phone]  Pager: [phone]    Subjective:     Principal Problem: Diabetic ulcer of right midfoot    HPI: Mr. Rivera is a 52-year-old male admitted with right foot infection. Patient states that he stepped on a piece of glass at work that penetrated through his shoe on May 19.  At that time he removed the glass, used antiseptic and antibacterial on his foot and felt that things were ok. He began having issues on May 26, when he noted that a large blister developed on his plantar surface near the first digit. No fevers, chills or sweats at home. On the 29th he was seen by his PCP and started on Cipro and Clindamycin. Patient notes that the clinda and Cipro gave him diarrhea, severe nausea and vomiting. Unfortunately his symptoms did not improve on antibiotics alone prompting him to come to the ED.     In the ED patient was noted to have a WBC of 15K, , , procalc 0.29. MRI showed diffuse subcutaneous and myofascial forefoot edema, fluid collection along the plantar aspect of the 1st and 2nd metatarsophalangeal joints with associated radiopaque foreign body likely reflecting glass.  No osteomyelitis. He was started on Dapto, Flagyl and Aztreonam given documented PCN allergy. He was taken to the OR yesterday for washout with podiatry. Copious amounts of purulent drainage noted from ulceration with tracking noted to dorsal foot at 1st interdigital space. Surgical cultures are growing GBS. He had an isolated fever post op. WBC is trending down. He is seen at bedside and feels well today.  Pain controlled. Patient denies current fevers, chills, sweats, dysuria, rash, shortness of breath, chest pain, abdominal pain. Diarrhea and nausea resolved. Of note, discussed his PCN allergy and patient states that his reaction was as a child and he does not remember the episode. His mother told him he went into a coma after receiving IM PCN. He denies being told of any anaphylactic like reactions. He is unaware if he has ever been treated with a cephalosporin.    Past Medical History:   Diagnosis Date    Allergy     CAD (coronary artery disease), native coronary artery 6/25/2013    Cancer     Diabetes mellitus     Diabetes mellitus, type 2     Disorder of kidney and ureter     Heart attack 04/2012    Hyperlipidemia     Hypertension     Muscular pain     post-op after colonoscopy       Past Surgical History:   Procedure Laterality Date    COLONOSCOPY N/A 2/17/2017    Procedure: COLONOSCOPY;  Surgeon: Simon Gomez MD;  Location: [address];  Service: Endoscopy;  Laterality: N/A;    CORONARY ANGIOPLASTY      TONSILLECTOMY         Review of patient's allergies indicates:   Allergen Reactions    Penicillins Anaphylaxis    Shellfish containing products      Other reaction(s): Unknown    Vancomycin Itching    Bactrim  [sulfamethoxazole-trimethoprim] Rash       Medications:  Prescriptions Prior to Admission   Medication Sig    aspirin 81 MG Chew Take 81 mg by mouth every evening. swallows    atorvastatin (LIPITOR) 80 MG tablet Take 1 tablet (80 mg total) by mouth once daily.    blood sugar diagnostic Strp Strips and lancets    Use before meals and bedtime    carvedilol (COREG) 12.5 MG tablet Take 1 tablet (12.5 mg total) by mouth 2 (two) times daily.    ciprofloxacin HCl (CIPRO) 500 MG tablet Take 1 tablet (500 mg total) by mouth every 12 (twelve) hours.    clindamycin (CLEOCIN) 300 MG capsule Take 1 capsule (300 mg total) by mouth every 6 (six) hours.    dulaglutide (TRULICITY) 1.5  "mg/0.5 mL PnIj Inject 1.5 mg into the skin every 7 days.    ibuprofen (ADVIL,MOTRIN) 800 MG tablet Take 1 tablet (800 mg total) by mouth 3 (three) times daily as needed for Pain.    insulin glargine (LANTUS SOLOSTAR) 100 unit/mL (3 mL) InPn pen INJECT 55 UNITS SUBCUTANEOUSLY IN THE EVENING    insulin lispro (HUMALOG) 100 unit/mL injection Inject 20 Units into the skin 3 (three) times daily before meals.    lisinopril (PRINIVIL,ZESTRIL) 40 MG tablet Take 1 tablet (40 mg total) by mouth once daily.    metFORMIN (GLUCOPHAGE) 1000 MG tablet Take 1 tablet (1,000 mg total) by mouth 2 (two) times daily with meals.    nitroGLYCERIN (NITROSTAT) 0.4 MG SL tablet Place 1 tablet (0.4 mg total) under the tongue every 5 (five) minutes as needed for Chest pain.    pen needle, diabetic 31 gauge x 3/16" Ndle Inject 1 each into the skin 3 (three) times daily before meals.     Antibiotics     Start     Stop Route Frequency Ordered    06/01/18 1854  DAPTOmycin (CUBICIN) 735 mg in sodium chloride 0.9% IVPB      -- IV Every 24 hours (non-standard times) 06/01/18 1854        Antifungals     None        Antivirals     None           Immunization History   Administered Date(s) Administered    Influenza - Intradermal - Quadrivalent - PF 10/24/2013    Pneumococcal Polysaccharide - 23 Valent 06/29/2017    Tdap 09/08/2015       Family History     Problem Relation (Age of Onset)    Heart disease Mother, Brother        Social History     Social History    Marital status:      Spouse name: N/A    Number of children: N/A    Years of education: N/A     Social History Main Topics    Smoking status: Never Smoker    Smokeless tobacco: Never Used    Alcohol use Yes      Comment: rare x1 beer-     Drug use: No    Sexual activity: Not Currently     Other Topics Concern    None     Social History Narrative    None     Review of Systems   Constitutional: Negative for chills, diaphoresis, fatigue and fever.   HENT: Negative for " congestion, rhinorrhea and sore throat.    Eyes: Negative for visual disturbance.   Respiratory: Negative for cough, chest tightness and shortness of breath.    Cardiovascular: Positive for leg swelling. Negative for chest pain.   Gastrointestinal: Negative for abdominal pain, constipation, diarrhea, nausea and vomiting.   Genitourinary: Negative for difficulty urinating, dysuria, frequency and hematuria.   Musculoskeletal: Positive for arthralgias (right foot) and joint swelling. Negative for back pain and neck pain.   Skin: Positive for color change and wound. Negative for rash.   Allergic/Immunologic: Negative for immunocompromised state.   Neurological: Negative for seizures, weakness and headaches.   Hematological: Negative for adenopathy.   Psychiatric/Behavioral: Negative for confusion. The patient is not nervous/anxious.    All other systems reviewed and are negative.    Objective:     Vital Signs (Most Recent):  Temp: 99.7 °F (37.6 °C) (06/03/18 0754)  Pulse: 67 (06/03/18 0754)  Resp: 14 (06/03/18 0754)  BP: 132/75 (06/03/18 0754)  SpO2: (!) 92 % (06/03/18 0754) Vital Signs (24h Range):  Temp:  [97.9 °F (36.6 °C)-101.9 °F (38.8 °C)] 99.7 °F (37.6 °C)  Pulse:  [65-92] 67  Resp:  [14-18] 14  SpO2:  [92 %-98 %] 92 %  BP: (111-142)/(56-85) 132/75     Weight: 122.5 kg (270 lb)  Body mass index is 35.62 kg/m².    Estimated Creatinine Clearance: 117.1 mL/min (based on SCr of 1 mg/dL).    Physical Exam   Constitutional: He is oriented to person, place, and time. He appears well-developed and well-nourished. No distress.   HENT:   Head: Normocephalic and atraumatic.   Eyes: Conjunctivae are normal. No scleral icterus.   Cardiovascular: Normal rate, regular rhythm and intact distal pulses.    No murmur heard.  Pulmonary/Chest: Effort normal and breath sounds normal. No respiratory distress. He has no wheezes.   Abdominal: Soft. Bowel sounds are normal. He exhibits no distension. There is no tenderness. There is no  guarding.   Musculoskeletal: He exhibits edema and tenderness (right foot).   Neurological: He is alert and oriented to person, place, and time.   Skin: Skin is warm and dry. He is not diaphoretic. There is erythema.   Right foot dressed. Dressing c/d/i. Cellulitis to RLE but improved. TTP.    Psychiatric: He has a normal mood and affect. His behavior is normal. Thought content normal.       Significant Labs:   Blood Culture:   Recent Labs  Lab 06/01/18  1445 06/02/18 2111 06/02/18 2115   LABBLOO No Growth to date  No Growth to date No Growth to date No Growth to date     CBC:   Recent Labs  Lab 06/02/18  0025 06/02/18  0506 06/03/18  0443   WBC 13.51* 13.76* 13.45*   HGB 12.4* 13.3* 11.9*   HCT 36.5* 38.6* 35.2*    252 259     CMP:   Recent Labs  Lab 06/02/18  0025 06/02/18  0506 06/03/18  0443   * 132* 132*   K 3.9 4.0 3.8   CL 98 98 99   CO2 22* 24 23   * 239* 150*   BUN 19 17 23*   CREATININE 1.1 1.0 1.0   CALCIUM 8.7 8.8 8.3*   PROT 6.3 6.5 6.0   ALBUMIN 2.4* 2.4* 2.1*   BILITOT 0.8 0.8 0.5   ALKPHOS 96 112 89   AST 12 14 18   ALT 16 17 15   ANIONGAP 11 10 10   EGFRNONAA >60.0 >60.0 >60.0     Procalcitonin:   Recent Labs  Lab 06/02/18 2111   PROCAL 0.29*     Respiratory Culture: No results for input(s): GSRESP, RESPIRATORYC in the last 4320 hours.  Urine Culture: No results for input(s): LABURIN in the last 4320 hours.  Urine Studies: No results for input(s): COLORU, APPEARANCEUA, PHUR, SPECGRAV, PROTEINUA, GLUCUA, KETONESU, BILIRUBINUA, OCCULTUA, NITRITE, UROBILINOGEN, LEUKOCYTESUR, RBCUA, WBCUA, BACTERIA, SQUAMEPITHEL, HYALINECASTS in the last 4320 hours.    Invalid input(s): WRIGHTSUR  Wound Culture:   Recent Labs  Lab 06/02/18  1218 06/02/18  1226   LABAERO STREPTOCOCCUS AGALACTIAE (GROUP B)ManyBeta-hemolytic streptococci are routinely susceptible to penicillins,cephalosporins and carbapenems.Susceptibility testing not routinely performed STREPTOCOCCUS AGALACTIAE (GROUP  B)ManyBeta-hemolytic streptococci are routinely susceptible to penicillins,cephalosporins and carbapenems.Susceptibility testing not routinely performed       Significant Imaging: I have reviewed all pertinent imaging results/findings within the past 24 hours.

## 2018-06-03 NOTE — SUBJECTIVE & OBJECTIVE
Interval Hx: S/p one day right foot I&D. Reports pain improved well controlled with pain meds. Xrays reviewed noting residual foreign body in foot.     Scheduled Meds:   atorvastatin  80 mg Oral Daily    carvedilol  12.5 mg Oral BID    ceFAZolin (ANCEF) IVPB  1 g Intravenous Q8H    enoxaparin  40 mg Subcutaneous Daily    insulin aspart U-100  15 Units Subcutaneous TIDWM    insulin detemir U-100  45 Units Subcutaneous QHS    lisinopril  40 mg Oral Daily     Continuous Infusions:  PRN Meds:acetaminophen, dextrose 50%, dextrose 50%, glucagon (human recombinant), glucose, glucose, insulin aspart U-100, ondansetron, oxyCODONE, sodium chloride 0.9%, sodium chloride 0.9%    Review of patient's allergies indicates:   Allergen Reactions    Penicillins Anaphylaxis    Shellfish containing products      Other reaction(s): Unknown    Vancomycin Itching    Bactrim  [sulfamethoxazole-trimethoprim] Rash        Past Medical History:   Diagnosis Date    Allergy     CAD (coronary artery disease), native coronary artery 6/25/2013    Cancer     Diabetes mellitus     Diabetes mellitus, type 2     Disorder of kidney and ureter     Heart attack 04/2012    Hyperlipidemia     Hypertension     Muscular pain     post-op after colonoscopy     Past Surgical History:   Procedure Laterality Date    COLONOSCOPY N/A 2/17/2017    Procedure: COLONOSCOPY;  Surgeon: Simon Gomez MD;  Location: [address]);  Service: Endoscopy;  Laterality: N/A;    CORONARY ANGIOPLASTY      TONSILLECTOMY         Family History     Problem Relation (Age of Onset)    Heart disease Mother, Brother        Social History Main Topics    Smoking status: Never Smoker    Smokeless tobacco: Never Used    Alcohol use Yes      Comment: rare x1 beer-     Drug use: No    Sexual activity: Not Currently     Review of Systems   Constitutional: Positive for activity change.   Respiratory: Negative for shortness of breath.    Cardiovascular: Negative  for chest pain and leg swelling.   Gastrointestinal: Negative for nausea and vomiting.   Genitourinary: Negative.    Musculoskeletal: Positive for arthralgias.   Skin: Positive for wound.   Neurological: Negative for weakness and numbness.   Psychiatric/Behavioral: Negative.      Objective:     Vital Signs (Most Recent):  Temp: 98.6 °F (37 °C) (06/03/18 1302)  Pulse: 70 (06/03/18 1302)  Resp: 18 (06/03/18 1302)  BP: 114/62 (06/03/18 1302)  SpO2: (!) 92 % (06/03/18 1400) Vital Signs (24h Range):  Temp:  [97.9 °F (36.6 °C)-101.9 °F (38.8 °C)] 98.6 °F (37 °C)  Pulse:  [65-92] 70  Resp:  [14-18] 18  SpO2:  [92 %-98 %] 92 %  BP: (111-142)/(56-76) 114/62     Weight: 122.5 kg (270 lb)  Body mass index is 35.62 kg/m².    Foot Exam    General  Orientation: alert and oriented to person, place, and time       Right Foot/Ankle     Inspection and Palpation  Tenderness: metatarsals   Swelling: metatarsals   Skin Exam: ulcer;     Neurovascular  Dorsalis pedis: 1+  Posterior tibial: 1+      Left Foot/Ankle      Inspection and Palpation  Tenderness: none   Swelling: none   Skin Exam: erythema;     Neurovascular  Dorsalis pedis: 1+  Posterior tibial: 1+          6/3/18    Wound 1:Plantar right foot   Measurement: 5cmx0.2cmx0.5cm  Base: fibrous   Periwound skin: macerated tissue - improved  Drainage: serous  Erythema: moderate- improved  Probe: negative probe to bone.                                   Laboratory:  CBC:     Recent Labs  Lab 06/03/18  0443   WBC 13.45*   RBC 4.05*   HGB 11.9*   HCT 35.2*      MCV 87   MCH 29.4   MCHC 33.8     CMP:     Recent Labs  Lab 06/03/18  0443   *   CALCIUM 8.3*   ALBUMIN 2.1*   PROT 6.0   *   K 3.8   CO2 23   CL 99   BUN 23*   CREATININE 1.0   ALKPHOS 89   ALT 15   AST 18   BILITOT 0.5       Diagnostic Results:  Xray: There is DJD and spurs on the calcaneus.  There is a foreign body soft tissues below in between the 1st and 2nd metatarsals thought to be a shard of glass.   There may be soft tissue swelling.  No fracture dislocation bone destruction seen.    MRI: No MR evidence of osteomyelitis.    Diffuse subcutaneous and myofascial forefoot edema with multiple plantar skin defects, overall, concerning for cellulitis/myositis.  Associated radiopaque foreign body likely reflecting glass is more conspicuous on comparison foot radiograph.    Nonspecific dermal fluid collection along the plantar aspect of the 1st and 2nd metatarsophalangeal joints, possibly a blister.  An infected fluid collection cannot be excluded.  Suggest correlation with direct visualization and clinical findings.    Clinical Findings:  S/p one day right foot I&D. Surgical site stable with serous drainage. Pain upon palpation right foot.

## 2018-06-04 ENCOUNTER — ANESTHESIA EVENT (OUTPATIENT)
Dept: SURGERY | Facility: HOSPITAL | Age: 53
DRG: 982 | End: 2018-06-04
Payer: COMMERCIAL

## 2018-06-04 PROBLEM — R50.9 FEVER: Status: ACTIVE | Noted: 2018-06-04

## 2018-06-04 PROBLEM — C18.9 COLON CANCER: Status: RESOLVED | Noted: 2017-04-05 | Resolved: 2018-06-04

## 2018-06-04 PROBLEM — K59.00 CONSTIPATION: Status: ACTIVE | Noted: 2018-06-04

## 2018-06-04 LAB
ALBUMIN SERPL BCP-MCNC: 2.2 G/DL
ALP SERPL-CCNC: 90 U/L
ALT SERPL W/O P-5'-P-CCNC: 18 U/L
ANION GAP SERPL CALC-SCNC: 9 MMOL/L
AST SERPL-CCNC: 21 U/L
BACTERIA SPEC AEROBE CULT: NORMAL
BACTERIA SPEC AEROBE CULT: NORMAL
BASOPHILS # BLD AUTO: 0.05 K/UL
BASOPHILS NFR BLD: 0.4 %
BILIRUB SERPL-MCNC: 0.5 MG/DL
BUN SERPL-MCNC: 16 MG/DL
CALCIUM SERPL-MCNC: 8.2 MG/DL
CHLORIDE SERPL-SCNC: 96 MMOL/L
CK SERPL-CCNC: 233 U/L
CO2 SERPL-SCNC: 25 MMOL/L
CREAT SERPL-MCNC: 0.9 MG/DL
DIFFERENTIAL METHOD: ABNORMAL
EOSINOPHIL # BLD AUTO: 0.1 K/UL
EOSINOPHIL NFR BLD: 1.1 %
ERYTHROCYTE [DISTWIDTH] IN BLOOD BY AUTOMATED COUNT: 11.9 %
EST. GFR  (AFRICAN AMERICAN): >60 ML/MIN/1.73 M^2
EST. GFR  (NON AFRICAN AMERICAN): >60 ML/MIN/1.73 M^2
GLUCOSE SERPL-MCNC: 205 MG/DL
HCT VFR BLD AUTO: 35.7 %
HGB BLD-MCNC: 12.2 G/DL
IMM GRANULOCYTES # BLD AUTO: 0.16 K/UL
IMM GRANULOCYTES NFR BLD AUTO: 1.2 %
LYMPHOCYTES # BLD AUTO: 1.2 K/UL
LYMPHOCYTES NFR BLD: 8.9 %
MAGNESIUM SERPL-MCNC: 1.8 MG/DL
MCH RBC QN AUTO: 29.4 PG
MCHC RBC AUTO-ENTMCNC: 34.2 G/DL
MCV RBC AUTO: 86 FL
MONOCYTES # BLD AUTO: 1.7 K/UL
MONOCYTES NFR BLD: 12.5 %
NEUTROPHILS # BLD AUTO: 10 K/UL
NEUTROPHILS NFR BLD: 75.9 %
NRBC BLD-RTO: 0 /100 WBC
OSMOLALITY SERPL: 281 MOSM/KG
OSMOLALITY UR: 302 MOSM/KG
PHOSPHATE SERPL-MCNC: 2.5 MG/DL
PLATELET # BLD AUTO: 284 K/UL
PMV BLD AUTO: 9.3 FL
POCT GLUCOSE: 208 MG/DL (ref 70–110)
POCT GLUCOSE: 214 MG/DL (ref 70–110)
POCT GLUCOSE: 238 MG/DL (ref 70–110)
POTASSIUM SERPL-SCNC: 4.1 MMOL/L
PROT SERPL-MCNC: 6.4 G/DL
RBC # BLD AUTO: 4.15 M/UL
SODIUM SERPL-SCNC: 130 MMOL/L
SODIUM UR-SCNC: 26 MMOL/L
WBC # BLD AUTO: 13.18 K/UL

## 2018-06-04 PROCEDURE — 63600175 PHARM REV CODE 636 W HCPCS: Performed by: STUDENT IN AN ORGANIZED HEALTH CARE EDUCATION/TRAINING PROGRAM

## 2018-06-04 PROCEDURE — 83735 ASSAY OF MAGNESIUM: CPT

## 2018-06-04 PROCEDURE — 83930 ASSAY OF BLOOD OSMOLALITY: CPT

## 2018-06-04 PROCEDURE — 83935 ASSAY OF URINE OSMOLALITY: CPT

## 2018-06-04 PROCEDURE — 25000003 PHARM REV CODE 250: Performed by: STUDENT IN AN ORGANIZED HEALTH CARE EDUCATION/TRAINING PROGRAM

## 2018-06-04 PROCEDURE — 36415 COLL VENOUS BLD VENIPUNCTURE: CPT

## 2018-06-04 PROCEDURE — 80053 COMPREHEN METABOLIC PANEL: CPT

## 2018-06-04 PROCEDURE — 99024 POSTOP FOLLOW-UP VISIT: CPT | Mod: ,,, | Performed by: PODIATRIST

## 2018-06-04 PROCEDURE — 84100 ASSAY OF PHOSPHORUS: CPT

## 2018-06-04 PROCEDURE — 99233 SBSQ HOSP IP/OBS HIGH 50: CPT | Mod: ,,, | Performed by: INTERNAL MEDICINE

## 2018-06-04 PROCEDURE — 84300 ASSAY OF URINE SODIUM: CPT

## 2018-06-04 PROCEDURE — 85025 COMPLETE CBC W/AUTO DIFF WBC: CPT

## 2018-06-04 PROCEDURE — 82550 ASSAY OF CK (CPK): CPT

## 2018-06-04 PROCEDURE — 99233 SBSQ HOSP IP/OBS HIGH 50: CPT | Mod: ,,, | Performed by: HOSPITALIST

## 2018-06-04 PROCEDURE — 11000001 HC ACUTE MED/SURG PRIVATE ROOM

## 2018-06-04 PROCEDURE — 63600175 PHARM REV CODE 636 W HCPCS: Performed by: PHYSICIAN ASSISTANT

## 2018-06-04 RX ORDER — AMOXICILLIN 250 MG
1 CAPSULE ORAL DAILY PRN
Status: DISCONTINUED | OUTPATIENT
Start: 2018-06-04 | End: 2018-06-04

## 2018-06-04 RX ORDER — LACTULOSE 10 G/15ML
30 SOLUTION ORAL ONCE
Status: DISCONTINUED | OUTPATIENT
Start: 2018-06-04 | End: 2018-06-05

## 2018-06-04 RX ORDER — AMOXICILLIN 250 MG
1 CAPSULE ORAL 2 TIMES DAILY
Status: DISCONTINUED | OUTPATIENT
Start: 2018-06-04 | End: 2018-06-05

## 2018-06-04 RX ORDER — CEFAZOLIN SODIUM 1 G/3ML
2 INJECTION, POWDER, FOR SOLUTION INTRAMUSCULAR; INTRAVENOUS
Status: DISCONTINUED | OUTPATIENT
Start: 2018-06-04 | End: 2018-06-15 | Stop reason: HOSPADM

## 2018-06-04 RX ORDER — POLYETHYLENE GLYCOL 3350 17 G/17G
17 POWDER, FOR SOLUTION ORAL DAILY
Status: DISCONTINUED | OUTPATIENT
Start: 2018-06-04 | End: 2018-06-05

## 2018-06-04 RX ADMIN — CEFAZOLIN 2 G: 1 INJECTION, POWDER, FOR SOLUTION INTRAMUSCULAR; INTRAVENOUS at 02:06

## 2018-06-04 RX ADMIN — CARVEDILOL 12.5 MG: 12.5 TABLET, FILM COATED ORAL at 09:06

## 2018-06-04 RX ADMIN — OXYCODONE HYDROCHLORIDE 5 MG: 5 TABLET ORAL at 03:06

## 2018-06-04 RX ADMIN — ACETAMINOPHEN 650 MG: 325 TABLET ORAL at 04:06

## 2018-06-04 RX ADMIN — CEFAZOLIN 1 G: 1 INJECTION, POWDER, FOR SOLUTION INTRAMUSCULAR; INTRAVENOUS at 05:06

## 2018-06-04 RX ADMIN — CARVEDILOL 12.5 MG: 12.5 TABLET, FILM COATED ORAL at 08:06

## 2018-06-04 RX ADMIN — INSULIN ASPART 4 UNITS: 100 INJECTION, SOLUTION INTRAVENOUS; SUBCUTANEOUS at 05:06

## 2018-06-04 RX ADMIN — OXYCODONE HYDROCHLORIDE 5 MG: 5 TABLET ORAL at 08:06

## 2018-06-04 RX ADMIN — CEFAZOLIN 2 G: 1 INJECTION, POWDER, FOR SOLUTION INTRAMUSCULAR; INTRAVENOUS at 10:06

## 2018-06-04 RX ADMIN — ATORVASTATIN CALCIUM 80 MG: 20 TABLET, FILM COATED ORAL at 09:06

## 2018-06-04 RX ADMIN — ONDANSETRON 8 MG: 4 TABLET, ORALLY DISINTEGRATING ORAL at 05:06

## 2018-06-04 RX ADMIN — INSULIN ASPART 15 UNITS: 100 INJECTION, SOLUTION INTRAVENOUS; SUBCUTANEOUS at 05:06

## 2018-06-04 RX ADMIN — LISINOPRIL 40 MG: 20 TABLET ORAL at 09:06

## 2018-06-04 RX ADMIN — ACETAMINOPHEN 650 MG: 325 TABLET ORAL at 08:06

## 2018-06-04 RX ADMIN — INSULIN ASPART 2 UNITS: 100 INJECTION, SOLUTION INTRAVENOUS; SUBCUTANEOUS at 08:06

## 2018-06-04 RX ADMIN — STANDARDIZED SENNA CONCENTRATE AND DOCUSATE SODIUM 1 TABLET: 8.6; 5 TABLET, FILM COATED ORAL at 08:06

## 2018-06-04 RX ADMIN — INSULIN DETEMIR 45 UNITS: 100 INJECTION, SOLUTION SUBCUTANEOUS at 08:06

## 2018-06-04 RX ADMIN — INSULIN ASPART 4 UNITS: 100 INJECTION, SOLUTION INTRAVENOUS; SUBCUTANEOUS at 12:06

## 2018-06-04 RX ADMIN — INSULIN ASPART 15 UNITS: 100 INJECTION, SOLUTION INTRAVENOUS; SUBCUTANEOUS at 09:06

## 2018-06-04 RX ADMIN — ENOXAPARIN SODIUM 40 MG: 100 INJECTION SUBCUTANEOUS at 05:06

## 2018-06-04 RX ADMIN — INSULIN ASPART 15 UNITS: 100 INJECTION, SOLUTION INTRAVENOUS; SUBCUTANEOUS at 12:06

## 2018-06-04 NOTE — PROGRESS NOTES
[facility]  Podiatry  Progress Note    Patient Name: Billy Sheffield Miguel  MRN: [id number]  Admission Date: 6/1/2018  Hospital Length of Stay: 3 days  Attending Physician: Josiah Sawyer, *  Primary Care Provider: BRAD Baker MD   Interval Hx: S/p  right foot I&D DOS:6/2/18 . Reports pain improved well controlled with pain meds. Xrays reviewed noting residual foreign body in foot. Plan for right foot debridement  tomorrow      Scheduled Meds:   atorvastatin  80 mg Oral Daily    carvedilol  12.5 mg Oral BID    ceFAZolin (ANCEF) IVPB  2 g Intravenous Q8H    insulin aspart U-100  15 Units Subcutaneous TIDWM    insulin detemir U-100  45 Units Subcutaneous QHS    lactulose  30 g Oral Once    lisinopril  40 mg Oral Daily    polyethylene glycol  17 g Oral Daily    senna-docusate 8.6-50 mg  1 tablet Oral BID     Continuous Infusions:  PRN Meds:acetaminophen, dextrose 50%, dextrose 50%, glucagon (human recombinant), glucose, glucose, insulin aspart U-100, ondansetron, oxyCODONE, sodium chloride 0.9%, sodium chloride 0.9%    Review of patient's allergies indicates:   Allergen Reactions    Penicillins Anaphylaxis    Shellfish containing products      Other reaction(s): Unknown    Vancomycin Itching    Bactrim  [sulfamethoxazole-trimethoprim] Rash        Past Medical History:   Diagnosis Date    Allergy     CAD (coronary artery disease), native coronary artery 6/25/2013    Cancer     Diabetes mellitus     Diabetes mellitus, type 2     Disorder of kidney and ureter     Heart attack 04/2012    Hyperlipidemia     Hypertension     Muscular pain     post-op after colonoscopy     Past Surgical History:   Procedure Laterality Date    COLONOSCOPY N/A 2/17/2017    Procedure: COLONOSCOPY;  Surgeon: Simon Gomez MD;  Location: [address]);  Service: Endoscopy;  Laterality: N/A;    CORONARY ANGIOPLASTY      INCISION AND DRAINAGE OF ABSCESS Right 6/2/2018    Procedure:  INCISION AND DRAINAGE, ABSCESS;  Surgeon: Bridget Santana DPM;  Location: [facility] OR [address] [place] [address];  Service: General;  Laterality: Right;    TONSILLECTOMY         Family History     Problem Relation (Age of Onset)    Heart disease Mother, Brother        Social History Main Topics    Smoking status: Never Smoker    Smokeless tobacco: Never Used    Alcohol use Yes      Comment: rare x1 beer-     Drug use: No    Sexual activity: Not Currently     Review of Systems   Constitutional: Positive for activity change.   Respiratory: Negative for shortness of breath.    Cardiovascular: Negative for chest pain and leg swelling.   Gastrointestinal: Negative for nausea and vomiting.   Genitourinary: Negative.    Musculoskeletal: Positive for arthralgias.   Skin: Positive for wound.   Neurological: Negative for weakness and numbness.   Psychiatric/Behavioral: Negative.      Objective:     Vital Signs (Most Recent):  Temp: (!) 100.7 °F (38.2 °C) (06/04/18 1608)  Pulse: 81 (06/04/18 1608)  Resp: 20 (06/04/18 1608)  BP: (!) 141/80 (06/04/18 1608)  SpO2: (!) 94 % (06/04/18 1608) Vital Signs (24h Range):  Temp:  [98.7 °F (37.1 °C)-100.8 °F (38.2 °C)] 100.7 °F (38.2 °C)  Pulse:  [68-81] 81  Resp:  [16-20] 20  SpO2:  [93 %-98 %] 94 %  BP: (108-141)/(58-80) 141/80     Weight: 122.5 kg (270 lb)  Body mass index is 35.62 kg/m².    Foot Exam    General  Orientation: alert and oriented to person, place, and time       Right Foot/Ankle     Inspection and Palpation  Tenderness: metatarsals   Swelling: metatarsals   Skin Exam: ulcer;     Neurovascular  Dorsalis pedis: 1+  Posterior tibial: 1+      Left Foot/Ankle      Inspection and Palpation  Tenderness: none   Swelling: none   Skin Exam: erythema;     Neurovascular  Dorsalis pedis: 1+  Posterior tibial: 1+          6/4/18    Wound 1:Plantar right foot   Measurement: 5cmx0.2cmx0.5cm  Base: fibrous   Periwound skin: macerated tissue - improved  Drainage: serous  Erythema: moderate  Probe:  negative probe to bone.                 Gentian violet applied to right plantar foot.                           Laboratory:  CBC:     Recent Labs  Lab 06/04/18  0508   WBC 13.18*   RBC 4.15*   HGB 12.2*   HCT 35.7*      MCV 86   MCH 29.4   MCHC 34.2     CMP:     Recent Labs  Lab 06/04/18  0508   *   CALCIUM 8.2*   ALBUMIN 2.2*   PROT 6.4   *   K 4.1   CO2 25   CL 96   BUN 16   CREATININE 0.9   ALKPHOS 90   ALT 18   AST 21   BILITOT 0.5       Diagnostic Results:  Xray: There is DJD and spurs on the calcaneus.  There is a foreign body soft tissues below in between the 1st and 2nd metatarsals thought to be a shard of glass.  There may be soft tissue swelling.  No fracture dislocation bone destruction seen.    MRI: No MR evidence of osteomyelitis.    Diffuse subcutaneous and myofascial forefoot edema with multiple plantar skin defects, overall, concerning for cellulitis/myositis.  Associated radiopaque foreign body likely reflecting glass is more conspicuous on comparison foot radiograph.    Nonspecific dermal fluid collection along the plantar aspect of the 1st and 2nd metatarsophalangeal joints, possibly a blister.  An infected fluid collection cannot be excluded.  Suggest correlation with direct visualization and clinical findings.    US arterial : Ankle-brachial index of 1.14 on the left.  VASILIY on the right not obtained secondary to patient's lower extremity cellulitis/wound.    No hemodynamically significant stenosis demonstrated in the right or left lower extremity arterial system.    Clinical Findings:  S/p  right foot I&D. Surgical site stable with serous drainage. Pain upon palpation right foot.     Assessment/Plan:     * Diabetic ulcer of right midfoot    S/p right foot I&D, surgical site with serous drainage.   Repeat xray right foot ordered, reviewed noting residual foreign body right foot.   Plan for right foot debridement tomorrow 6/5/18 @noon   NPO at midnight  Long discussion with  patient regarding the procedure in detail.  Informed consent discussed in detail  including all alternatives, risks and complications not limited to consent form.  Patient understands all risks, potential complications, and alternatives, including, but not limited to those listed on the consent form. All questions were answered. No guarantees given or implied as to outcome. Informed verbal and written consent was obtained. Consent forms read, signed, witnessed.       Abx per ID appreciate assistance.   Podiatry will follow      Weightbearing Status: Partial heel WB right   Offloading Device: DARCO shoe     Tonya Valle DPM PGY-3  Pager: [phone]              Right foot infection    Per above         Type 2 diabetes mellitus 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, with long-term current use of insulin    Per primary             Tonya Valle MD  Podiatry  [facility]

## 2018-06-04 NOTE — SUBJECTIVE & OBJECTIVE
Interval Hx: S/p  right foot I&D DOS:6/2/18 . Reports pain improved well controlled with pain meds. Xrays reviewed noting residual foreign body in foot. Plan for right foot debridement  tomorrow      Scheduled Meds:   atorvastatin  80 mg Oral Daily    carvedilol  12.5 mg Oral BID    ceFAZolin (ANCEF) IVPB  2 g Intravenous Q8H    insulin aspart U-100  15 Units Subcutaneous TIDWM    insulin detemir U-100  45 Units Subcutaneous QHS    lactulose  30 g Oral Once    lisinopril  40 mg Oral Daily    polyethylene glycol  17 g Oral Daily    senna-docusate 8.6-50 mg  1 tablet Oral BID     Continuous Infusions:  PRN Meds:acetaminophen, dextrose 50%, dextrose 50%, glucagon (human recombinant), glucose, glucose, insulin aspart U-100, ondansetron, oxyCODONE, sodium chloride 0.9%, sodium chloride 0.9%    Review of patient's allergies indicates:   Allergen Reactions    Penicillins Anaphylaxis    Shellfish containing products      Other reaction(s): Unknown    Vancomycin Itching    Bactrim  [sulfamethoxazole-trimethoprim] Rash        Past Medical History:   Diagnosis Date    Allergy     CAD (coronary artery disease), native coronary artery 6/25/2013    Cancer     Diabetes mellitus     Diabetes mellitus, type 2     Disorder of kidney and ureter     Heart attack 04/2012    Hyperlipidemia     Hypertension     Muscular pain     post-op after colonoscopy     Past Surgical History:   Procedure Laterality Date    COLONOSCOPY N/A 2/17/2017    Procedure: COLONOSCOPY;  Surgeon: Simon Gomez MD;  Location: [facility] (4TH FLR);  Service: Endoscopy;  Laterality: N/A;    CORONARY ANGIOPLASTY      INCISION AND DRAINAGE OF ABSCESS Right 6/2/2018    Procedure: INCISION AND DRAINAGE, ABSCESS;  Surgeon: Bridget Santana DPM;  Location: [facility] 2ND FLR;  Service: General;  Laterality: Right;    TONSILLECTOMY         Family History     Problem Relation (Age of Onset)    Heart disease Mother, Brother        Social History  Main Topics    Smoking status: Never Smoker    Smokeless tobacco: Never Used    Alcohol use Yes      Comment: rare x1 beer-     Drug use: No    Sexual activity: Not Currently     Review of Systems   Constitutional: Positive for activity change.   Respiratory: Negative for shortness of breath.    Cardiovascular: Negative for chest pain and leg swelling.   Gastrointestinal: Negative for nausea and vomiting.   Genitourinary: Negative.    Musculoskeletal: Positive for arthralgias.   Skin: Positive for wound.   Neurological: Negative for weakness and numbness.   Psychiatric/Behavioral: Negative.      Objective:     Vital Signs (Most Recent):  Temp: (!) 100.7 °F (38.2 °C) (06/04/18 1608)  Pulse: 81 (06/04/18 1608)  Resp: 20 (06/04/18 1608)  BP: (!) 141/80 (06/04/18 1608)  SpO2: (!) 94 % (06/04/18 1608) Vital Signs (24h Range):  Temp:  [98.7 °F (37.1 °C)-100.8 °F (38.2 °C)] 100.7 °F (38.2 °C)  Pulse:  [68-81] 81  Resp:  [16-20] 20  SpO2:  [93 %-98 %] 94 %  BP: (108-141)/(58-80) 141/80     Weight: 122.5 kg (270 lb)  Body mass index is 35.62 kg/m².    Foot Exam    General  Orientation: alert and oriented to person, place, and time       Right Foot/Ankle     Inspection and Palpation  Tenderness: metatarsals   Swelling: metatarsals   Skin Exam: ulcer;     Neurovascular  Dorsalis pedis: 1+  Posterior tibial: 1+      Left Foot/Ankle      Inspection and Palpation  Tenderness: none   Swelling: none   Skin Exam: erythema;     Neurovascular  Dorsalis pedis: 1+  Posterior tibial: 1+          6/4/18    Wound 1:Plantar right foot   Measurement: 5cmx0.2cmx0.5cm  Base: fibrous   Periwound skin: macerated tissue - improved  Drainage: serous  Erythema: moderate  Probe: negative probe to bone.                 Gentian violet applied to right plantar foot.                           Laboratory:  CBC:     Recent Labs  Lab 06/04/18  0508   WBC 13.18*   RBC 4.15*   HGB 12.2*   HCT 35.7*      MCV 86   MCH 29.4   MCHC 34.2     CMP:      Recent Labs  Lab 06/04/18  0508   *   CALCIUM 8.2*   ALBUMIN 2.2*   PROT 6.4   *   K 4.1   CO2 25   CL 96   BUN 16   CREATININE 0.9   ALKPHOS 90   ALT 18   AST 21   BILITOT 0.5       Diagnostic Results:  Xray: There is DJD and spurs on the calcaneus.  There is a foreign body soft tissues below in between the 1st and 2nd metatarsals thought to be a shard of glass.  There may be soft tissue swelling.  No fracture dislocation bone destruction seen.    MRI: No MR evidence of osteomyelitis.    Diffuse subcutaneous and myofascial forefoot edema with multiple plantar skin defects, overall, concerning for cellulitis/myositis.  Associated radiopaque foreign body likely reflecting glass is more conspicuous on comparison foot radiograph.    Nonspecific dermal fluid collection along the plantar aspect of the 1st and 2nd metatarsophalangeal joints, possibly a blister.  An infected fluid collection cannot be excluded.  Suggest correlation with direct visualization and clinical findings.    Clinical Findings:  S/p  right foot I&D. Surgical site stable with serous drainage. Pain upon palpation right foot.

## 2018-06-04 NOTE — ANESTHESIA POSTPROCEDURE EVALUATION
"Anesthesia Post Evaluation    Patient: Billy Yatesn    Procedure(s) Performed: Procedure(s) (LRB):  INCISION AND DRAINAGE, ABSCESS (Right)    Final Anesthesia Type: general  Patient location during evaluation: PACU  Patient participation: Yes- Able to Participate  Level of consciousness: awake and alert and oriented  Post-procedure vital signs: reviewed and stable  Pain management: adequate  Airway patency: patent  PONV status at discharge: No PONV  Anesthetic complications: no      Cardiovascular status: blood pressure returned to baseline  Respiratory status: unassisted, spontaneous ventilation and room air  Hydration status: euvolemic  Follow-up not needed.        Visit Vitals  BP (!) 119/58   Pulse 77   Temp 37.6 °C (99.6 °F)   Resp 16   Ht 6' 1" (1.854 m)   Wt 122.5 kg (270 lb)   SpO2 (!) 93%   BMI 35.62 kg/m²       Pain/Gabriel Score: Pain Assessment Performed: Yes (6/3/2018  8:34 PM)  Presence of Pain: complains of pain/discomfort (6/3/2018  8:34 PM)  Pain Rating Prior to Med Admin: 5 (6/4/2018  4:49 AM)  Pain Rating Post Med Admin: 2 (6/3/2018  5:36 PM)      "

## 2018-06-04 NOTE — SUBJECTIVE & OBJECTIVE
Interval History:   NAEON.  Denies having any fever chills or night sweats  Wound cultures + Group B Strep  Cellulitis of RLE. (improved)   Tolerating cefazolin without any adverse reactions.   Reports working as a  and stepped on glass. Has neuropathy.     Review of Systems   Constitutional: Negative for chills, diaphoresis, fatigue and fever.   HENT: Negative for congestion, rhinorrhea and sore throat.    Eyes: Negative for visual disturbance.   Respiratory: Negative for cough, chest tightness and shortness of breath.    Cardiovascular: Positive for leg swelling. Negative for chest pain.   Gastrointestinal: Negative for abdominal pain, constipation, diarrhea, nausea and vomiting.   Genitourinary: Negative for difficulty urinating, dysuria, frequency and hematuria.   Musculoskeletal: Positive for arthralgias (right foot) and joint swelling. Negative for back pain and neck pain.   Skin: Positive for color change and wound. Negative for rash.   Allergic/Immunologic: Negative for immunocompromised state.   Neurological: Negative for seizures, weakness and headaches.   Hematological: Negative for adenopathy.   Psychiatric/Behavioral: Negative for confusion. The patient is not nervous/anxious.    All other systems reviewed and are negative.    Objective:     Vital Signs (Most Recent):  Temp: 98.7 °F (37.1 °C) (06/04/18 0745)  Pulse: 78 (06/04/18 0907)  Resp: 18 (06/04/18 0745)  BP: 108/66 (06/04/18 0907)  SpO2: 98 % (06/04/18 0745) Vital Signs (24h Range):  Temp:  [98.6 °F (37 °C)-101.4 °F (38.6 °C)] 98.7 °F (37.1 °C)  Pulse:  [68-78] 78  Resp:  [16-18] 18  SpO2:  [92 %-98 %] 98 %  BP: (108-136)/(58-73) 108/66     Weight: 122.5 kg (270 lb)  Body mass index is 35.62 kg/m².    Estimated Creatinine Clearance: 130.1 mL/min (based on SCr of 0.9 mg/dL).    Physical Exam   Constitutional: He is oriented to person, place, and time. He appears well-developed and well-nourished. No distress.   HENT:   Head: Normocephalic  and atraumatic.   Eyes: Conjunctivae are normal. No scleral icterus.   Cardiovascular: Normal rate, regular rhythm and intact distal pulses.    No murmur heard.  Pulmonary/Chest: Effort normal and breath sounds normal. No respiratory distress. He has no wheezes.   Abdominal: Soft. Bowel sounds are normal. He exhibits no distension. There is no tenderness. There is no guarding.   Musculoskeletal: He exhibits edema and tenderness (right foot).   Neurological: He is alert and oriented to person, place, and time.   Skin: Skin is warm and dry. He is not diaphoretic. There is erythema.   Right foot dressed. Dressing c/d/i. Cellulitis to RLE but improved. TTP.    Psychiatric: He has a normal mood and affect. His behavior is normal. Thought content normal.       Significant Labs:   Blood Culture:   Recent Labs  Lab 06/01/18  1445 06/02/18  2111 06/02/18  2115   LABBLOO No Growth to date  No Growth to date  No Growth to date No Growth to date  No Growth to date No Growth to date  No Growth to date     CBC:   Recent Labs  Lab 06/03/18  0443 06/04/18  0508   WBC 13.45* 13.18*   HGB 11.9* 12.2*   HCT 35.2* 35.7*    284     CMP:   Recent Labs  Lab 06/03/18  0443 06/04/18  0508   * 130*   K 3.8 4.1   CL 99 96   CO2 23 25   * 205*   BUN 23* 16   CREATININE 1.0 0.9   CALCIUM 8.3* 8.2*   PROT 6.0 6.4   ALBUMIN 2.1* 2.2*   BILITOT 0.5 0.5   ALKPHOS 89 90   AST 18 21   ALT 15 18   ANIONGAP 10 9   EGFRNONAA >60.0 >60.0     Wound Culture:   Recent Labs  Lab 06/02/18  1218 06/02/18  1226   LABAERO STREPTOCOCCUS AGALACTIAE (GROUP B)ManyBeta-hemolytic streptococci are routinely susceptible to penicillins,cephalosporins and carbapenems.Susceptibility testing not routinely performed STREPTOCOCCUS AGALACTIAE (GROUP B)ManyBeta-hemolytic streptococci are routinely susceptible to penicillins,cephalosporins and carbapenems.Susceptibility testing not routinely performed     All pertinent labs within the past 24 hours have  been reviewed.    Significant Imaging: I have reviewed all pertinent imaging results/findings within the past 24 hours.

## 2018-06-04 NOTE — ASSESSMENT & PLAN NOTE
52-year-old male admitted with right foot infection after penetrating glass trauma that failed outpatient antibiotics alone. In the ED patient was noted to have a WBC of 15K, , . MRI showed diffuse subcutaneous and myofascial forefoot edema, fluid collection along the plantar aspect of the 1st and 2nd metatarsophalangeal joints with associated radiopaque foreign body likely reflecting glass. No osteomyelitis. He was started on Dapto, Flagyl and Aztreonam given documented PCN allergy. He was taken to the OR 6/2 for washout with podiatry. Copious amounts of purulent drainage noted with tracking no to dorsal foot at 1st interdigital space. Surgical cultures are growing GBS. He had an isolated fever post op. Otherwise afebrile. WBC 13K. Doing well today. Clinically stable/non septic. Blood cultures NGTD.    Discussed his documented PCN allergy. States he was told by his mother that he went into a possible coma after receiving IM Penicillin as a child. Does not remember the event. Denies known history of anaphylactic type reactions. He was informed penicillins are the drug of choice for GBS. Given he is in a monitored setting, he is willing to try a cephalosporin.       Plan  - Continue cefazolin (increased 2gq8hr). Tolerating cefazolin wihtout any adverse reaction.   - Will follow surgical cultures to guide antibiotic therapy.   - As his foot has been washed out and there is no evidence of osteomyelitis, can likely discharge on oral antibiotics.  - Recommend Allergy follow up as an outpatient for PCN skin testing   - Recommend leg elevation.   - ID will follow.

## 2018-06-04 NOTE — ASSESSMENT & PLAN NOTE
S/p right foot I&D, surgical site with serous drainage.   Repeat xray right foot ordered, reviewed noting residual foreign body right foot.   Plan for right foot debridement tomorrow 6/5/18 @noon   NPO at midnight  Long discussion with patient regarding the procedure in detail.  Informed consent discussed in detail  including all alternatives, risks and complications not limited to consent form.  Patient understands all risks, potential complications, and alternatives, including, but not limited to those listed on the consent form. All questions were answered. No guarantees given or implied as to outcome. Informed verbal and written consent was obtained. Consent forms read, signed, witnessed.       Abx per ID appreciate assistance.   Podiatry will follow      Weightbearing Status: Partial heel WB right   Offloading Device: DARCO destiny [place] DPM PGY-3  Pager: [phone]

## 2018-06-04 NOTE — PROGRESS NOTES
[facility]  Infectious Disease  Progress Note    Patient Name: Billy Sheffield Miguel  MRN: [id number]  Admission Date: 6/1/2018  Length of Stay: 3 days  Attending Physician: Josiah Sawyer, *  Primary Care Provider: BRAD Baker MD    Isolation Status: No active isolations  Assessment/Plan:      Right foot infection    52-year-old male admitted with right foot infection after penetrating glass trauma that failed outpatient antibiotics alone. In the ED patient was noted to have a WBC of 15K, , . MRI showed diffuse subcutaneous and myofascial forefoot edema, fluid collection along the plantar aspect of the 1st and 2nd metatarsophalangeal joints with associated radiopaque foreign body likely reflecting glass. No osteomyelitis. He was started on Dapto, Flagyl and Aztreonam given documented PCN allergy. He was taken to the OR 6/2 for washout with podiatry. Copious amounts of purulent drainage noted with tracking no to dorsal foot at 1st interdigital space. Surgical cultures are growing GBS. He had an isolated fever post op. Otherwise afebrile. WBC 13K. Doing well today. Clinically stable/non septic. Blood cultures NGTD.    Discussed his documented PCN allergy. States he was told by his mother that he went into a possible coma after receiving IM Penicillin as a child. Does not remember the event. Denies known history of anaphylactic type reactions. He was informed penicillins are the drug of choice for GBS. Given he is in a monitored setting, he is willing to try a cephalosporin.       Plan  - Continue cefazolin (increased 2gq8hr). Tolerating cefazolin wihtout any adverse reaction.   - Will follow surgical cultures to guide antibiotic therapy.   - As his foot has been washed out and there is no evidence of osteomyelitis, can likely discharge on oral antibiotics.  - Recommend Allergy follow up as an outpatient for PCN skin testing   - Recommend leg elevation.   - ID will follow.              Thank you for your consult. I will follow-up with patient. Please contact us if you have any additional questions.    Elizabeth Weinberg PA-C  Infectious Disease  [facility] [phone]    Subjective:     Principal Problem:Diabetic ulcer of right midfoot    HPI: Mr. Rivera is a 52-year-old male admitted with right foot infection. Patient states that he stepped on a piece of glass at work that penetrated through his shoe on May 19.  At that time he removed the glass, used antiseptic and antibacterial on his foot and felt that things were ok. He began having issues on May 26, when he noted that a large blister developed on his plantar surface near the first digit. No fevers, chills or sweats at home. On the 29th he was seen by his PCP and started on Cipro and Clindamycin. Patient notes that the clinda and Cipro gave him diarrhea, severe nausea and vomiting. Unfortunately his symptoms did not improve on antibiotics alone prompting him to come to the ED.     In the ED patient was noted to have a WBC of 15K, , , procalc 0.29. MRI showed diffuse subcutaneous and myofascial forefoot edema, fluid collection along the plantar aspect of the 1st and 2nd metatarsophalangeal joints with associated radiopaque foreign body likely reflecting glass.  No osteomyelitis. He was started on Dapto, Flagyl and Aztreonam given documented PCN allergy. He was taken to the OR yesterday for washout with podiatry. Copious amounts of purulent drainage noted from ulceration with tracking noted to dorsal foot at 1st interdigital space. Surgical cultures are growing GBS. He had an isolated fever post op. WBC is trending down. He is seen at bedside and feels well today. Pain controlled. Patient denies current fevers, chills, sweats, dysuria, rash, shortness of breath, chest pain, abdominal pain. Diarrhea and nausea resolved. Of note, discussed his PCN allergy and patient states that his reaction was as a child  and he does not remember the episode. His mother told him he went into a coma after receiving IM PCN. He denies being told of any anaphylactic like reactions. He is unaware if he has ever been treated with a cephalosporin.  Interval History:   NAEON.  Denies having any fever chills or night sweats  Wound cultures + Group B Strep  Cellulitis of RLE. (improved)   Tolerating cefazolin without any adverse reactions.   Reports working as a  and stepped on glass. Has neuropathy.     Review of Systems   Constitutional: Negative for chills, diaphoresis, fatigue and fever.   HENT: Negative for congestion, rhinorrhea and sore throat.    Eyes: Negative for visual disturbance.   Respiratory: Negative for cough, chest tightness and shortness of breath.    Cardiovascular: Positive for leg swelling. Negative for chest pain.   Gastrointestinal: Negative for abdominal pain, constipation, diarrhea, nausea and vomiting.   Genitourinary: Negative for difficulty urinating, dysuria, frequency and hematuria.   Musculoskeletal: Positive for arthralgias (right foot) and joint swelling. Negative for back pain and neck pain.   Skin: Positive for color change and wound. Negative for rash.   Allergic/Immunologic: Negative for immunocompromised state.   Neurological: Negative for seizures, weakness and headaches.   Hematological: Negative for adenopathy.   Psychiatric/Behavioral: Negative for confusion. The patient is not nervous/anxious.    All other systems reviewed and are negative.    Objective:     Vital Signs (Most Recent):  Temp: 98.7 °F (37.1 °C) (06/04/18 0745)  Pulse: 78 (06/04/18 0907)  Resp: 18 (06/04/18 0745)  BP: 108/66 (06/04/18 0907)  SpO2: 98 % (06/04/18 0745) Vital Signs (24h Range):  Temp:  [98.6 °F (37 °C)-101.4 °F (38.6 °C)] 98.7 °F (37.1 °C)  Pulse:  [68-78] 78  Resp:  [16-18] 18  SpO2:  [92 %-98 %] 98 %  BP: (108-136)/(58-73) 108/66     Weight: 122.5 kg (270 lb)  Body mass index is 35.62 kg/m².    Estimated  Creatinine Clearance: 130.1 mL/min (based on SCr of 0.9 mg/dL).    Physical Exam   Constitutional: He is oriented to person, place, and time. He appears well-developed and well-nourished. No distress.   HENT:   Head: Normocephalic and atraumatic.   Eyes: Conjunctivae are normal. No scleral icterus.   Cardiovascular: Normal rate, regular rhythm and intact distal pulses.    No murmur heard.  Pulmonary/Chest: Effort normal and breath sounds normal. No respiratory distress. He has no wheezes.   Abdominal: Soft. Bowel sounds are normal. He exhibits no distension. There is no tenderness. There is no guarding.   Musculoskeletal: He exhibits edema and tenderness (right foot).   Neurological: He is alert and oriented to person, place, and time.   Skin: Skin is warm and dry. He is not diaphoretic. There is erythema.   Right foot dressed. Dressing c/d/i. Cellulitis to RLE but improved. TTP.    Psychiatric: He has a normal mood and affect. His behavior is normal. Thought content normal.       Significant Labs:   Blood Culture:   Recent Labs  Lab 06/01/18  1445 06/02/18  2111 06/02/18  2115   LABBLOO No Growth to date  No Growth to date  No Growth to date No Growth to date  No Growth to date No Growth to date  No Growth to date     CBC:   Recent Labs  Lab 06/03/18  0443 06/04/18  0508   WBC 13.45* 13.18*   HGB 11.9* 12.2*   HCT 35.2* 35.7*    284     CMP:   Recent Labs  Lab 06/03/18  0443 06/04/18  0508   * 130*   K 3.8 4.1   CL 99 96   CO2 23 25   * 205*   BUN 23* 16   CREATININE 1.0 0.9   CALCIUM 8.3* 8.2*   PROT 6.0 6.4   ALBUMIN 2.1* 2.2*   BILITOT 0.5 0.5   ALKPHOS 89 90   AST 18 21   ALT 15 18   ANIONGAP 10 9   EGFRNONAA >60.0 >60.0     Wound Culture:   Recent Labs  Lab 06/02/18  1218 06/02/18  1226   LABAERO STREPTOCOCCUS AGALACTIAE (GROUP B)ManyBeta-hemolytic streptococci are routinely susceptible to penicillins,cephalosporins and carbapenems.Susceptibility testing not routinely performed  STREPTOCOCCUS AGALACTIAE (GROUP B)ManyBeta-hemolytic streptococci are routinely susceptible to penicillins,cephalosporins and carbapenems.Susceptibility testing not routinely performed     All pertinent labs within the past 24 hours have been reviewed.    Significant Imaging: I have reviewed all pertinent imaging results/findings within the past 24 hours.

## 2018-06-04 NOTE — SUBJECTIVE & OBJECTIVE
Interval History:   NAEON. No complaints this morning. Reports last BM was 4 days ago, on day of admission.    Review of Systems   Constitutional: Negative for chills, fever and unexpected weight change.   HENT: Negative for hearing loss.    Eyes: Negative for visual disturbance.   Respiratory: Negative for shortness of breath.    Cardiovascular: Negative for chest pain and leg swelling.   Gastrointestinal: Negative for abdominal pain, constipation, diarrhea, nausea and vomiting.   Genitourinary: Negative for dysuria and hematuria.   Musculoskeletal: Negative for arthralgias.   Skin: Positive for color change, rash and wound.   Neurological: Negative for seizures and weakness.   Hematological: Negative for adenopathy. Does not bruise/bleed easily.     Objective:     Vital Signs (Most Recent):  Temp: (!) 100.7 °F (38.2 °C) (06/04/18 1608)  Pulse: 81 (06/04/18 1608)  Resp: 20 (06/04/18 1608)  BP: (!) 141/80 (06/04/18 1608)  SpO2: (!) 94 % (06/04/18 1608) Vital Signs (24h Range):  Temp:  [98.7 °F (37.1 °C)-100.8 °F (38.2 °C)] 100.7 °F (38.2 °C)  Pulse:  [68-81] 81  Resp:  [16-20] 20  SpO2:  [93 %-98 %] 94 %  BP: (108-141)/(58-80) 141/80     Weight: 122.5 kg (270 lb)  Body mass index is 35.62 kg/m².  No intake or output data in the 24 hours ending 06/04/18 1856   Physical Exam   Constitutional: He is oriented to person, place, and time. He appears well-developed and well-nourished. No distress.   HENT:   Head: Normocephalic and atraumatic.   Eyes: Conjunctivae, EOM and lids are normal.   Neck: No tracheal deviation present. No thyromegaly present.   Cardiovascular: Normal rate, regular rhythm, normal heart sounds and intact distal pulses.    No murmur heard.  Pulmonary/Chest: Effort normal and breath sounds normal. No respiratory distress. He has no wheezes. He has no rales.   Abdominal: Soft. He exhibits no distension and no mass. There is no tenderness. No hernia.   Musculoskeletal: He exhibits tenderness. He exhibits  no edema.   Right foot bandage, intact, clean, dry. Point tenderness on sole of foot at location of wound.   Neurological: He is alert and oriented to person, place, and time. No cranial nerve deficit. He exhibits normal muscle tone.   Skin: Skin is warm and dry. Rash noted. He is not diaphoretic. There is erythema.   RLE erythema up to mid shin, no change from marked border. Non tender, non edematous.   Psychiatric: He has a normal mood and affect.   Vitals reviewed.    Significant Labs: All pertinent labs within the past 24 hours have been reviewed.  CMP:   6/4/2018 05:08   Sodium 130 (L)   Potassium 4.1   Chloride 96   CO2 25   Anion Gap 9   BUN, Bld 16   Creatinine 0.9   eGFR if non African American >60.0   eGFR if African American >60.0   Glucose 205 (H)   Calcium 8.2 (L)   Phosphorus 2.5 (L)   Magnesium 1.8   Alkaline Phosphatase 90   Total Protein 6.4   Albumin 2.2 (L)   Total Bilirubin 0.5   AST 21   ALT 18     CBC:   6/4/2018 05:08   WBC 13.18 (H)   RBC 4.15 (L)   Hemoglobin 12.2 (L)   Hematocrit 35.7 (L)   MCV 86   MCH 29.4   MCHC 34.2   RDW 11.9   Platelets 284   MPV 9.3   Gran% 75.9 (H)   Gran # (ANC) 10.0 (H)   Immature Granulocytes 1.2 (H)   Immature Grans (Abs) 0.16 (H)   Lymph% 8.9 (L)   Lymph # 1.2   Mono% 12.5   Mono # 1.7 (H)   Eosinophil% 1.1   Eos # 0.1   Basophil% 0.4   Baso # 0.05   nRBC 0     Urine studies:   6/4/2018 10:00   Sodium Urine Random 26   Osmolality, Ur 302      6/4/2018 05:08   Osmolality 281     DM panel:   6/3/2018 07:20 6/3/2018 12:12 6/3/2018 17:41 6/4/2018 12:30 6/4/2018 17:11   POCT Glucose 149 (H) 185 (H) 179 (H) 214 (H) 208 (H)     Significant Imaging: I have reviewed and interpreted all pertinent imaging results/findings within the past 24 hours.   No new imaging.

## 2018-06-04 NOTE — ANESTHESIA PREPROCEDURE EVALUATION
[facility]  Anesthesia Pre-Operative Evaluation         Patient Name: Billy Rivera  YOB: 1965  MRN: [id number]    SUBJECTIVE:     Pre-operative evaluation for Procedure(s) (LRB):  INCISION AND DRAINAGE, FOOT (Right)     06/04/2018    Billy Rivera is a 52 y.o. male w/ a significant PMHx of HTN, T2DM, MI with stent in 2013, colon cancers s/p resection, presenting with right foot infection after penetrating glass trauma. S/p I&D on 6/1/18.    Patient now presents for the above procedure(s).      LDA:   18 g PIV Rt hand    Prev airway: Ray, grade 1 view. Large,floppy epiglottis, obesity and oropharyngeal edema noted.    Drips: None documented.      Patient Active Problem List   Diagnosis    HTN (hypertension)    NSTEMI May 2013 - peak troponin 0.22    Dyslipidemia    Obesity, Class II, BMI 35-39.9, with comorbidity    CAD s/p PCI of LAD (SHELBY) in May 2013    BDR (background diabetic retinopathy) - Both Eyes    Type II or unspecified type diabetes mellitus with cardiovascular complication, uncontrolled    PVD (peripheral vascular disease)    Sleep apnea    Proteinuria    Screening    Edema    Hypertension associated with diabetes    Type 2 diabetes mellitus with diabetic peripheral angiopathy without gangrene, with long-term current use of insulin    Colon cancer    Onychomycosis of multiple toenails with type 2 diabetes mellitus    Type 2 diabetes mellitus with hyperglycemia, with long-term current use of insulin    Type 2 diabetes mellitus 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, with long-term current use of insulin    Diabetic ulcer of right midfoot    Leukocytosis    Right foot infection       Review of patient's allergies indicates:   Allergen Reactions    Penicillins Anaphylaxis    Shellfish containing products      Other reaction(s): Unknown    Vancomycin Itching    Bactrim   [sulfamethoxazole-trimethoprim] Rash       Current Inpatient Medications:   atorvastatin  80 mg Oral Daily    carvedilol  12.5 mg Oral BID    ceFAZolin (ANCEF) IVPB  1 g Intravenous Q8H    enoxaparin  40 mg Subcutaneous Daily    insulin aspart U-100  15 Units Subcutaneous TIDWM    insulin detemir U-100  45 Units Subcutaneous QHS    lisinopril  40 mg Oral Daily       No current facility-administered medications on file prior to encounter.      Current Outpatient Prescriptions on File Prior to Encounter   Medication Sig Dispense Refill    aspirin 81 MG Chew Take 81 mg by mouth every evening. swallows      atorvastatin (LIPITOR) 80 MG tablet Take 1 tablet (80 mg total) by mouth once daily. 30 tablet 11    blood sugar diagnostic Strp Strips and lancets    Use before meals and bedtime 150 strip 12    carvedilol (COREG) 12.5 MG tablet Take 1 tablet (12.5 mg total) by mouth 2 (two) times daily. 60 tablet 3    ciprofloxacin HCl (CIPRO) 500 MG tablet Take 1 tablet (500 mg total) by mouth every 12 (twelve) hours. 28 tablet 0    clindamycin (CLEOCIN) 300 MG capsule Take 1 capsule (300 mg total) by mouth every 6 (six) hours. 56 capsule 0    dulaglutide (TRULICITY) 1.5 mg/0.5 mL PnIj Inject 1.5 mg into the skin every 7 days. 4 Syringe 6    ibuprofen (ADVIL,MOTRIN) 800 MG tablet Take 1 tablet (800 mg total) by mouth 3 (three) times daily as needed for Pain. 50 tablet 0    insulin glargine (LANTUS SOLOSTAR) 100 unit/mL (3 mL) InPn pen INJECT 55 UNITS SUBCUTANEOUSLY IN THE EVENING 2 Box 3    insulin lispro (HUMALOG) 100 unit/mL injection Inject 20 Units into the skin 3 (three) times daily before meals. 6 vial 11    lisinopril (PRINIVIL,ZESTRIL) 40 MG tablet Take 1 tablet (40 mg total) by mouth once daily. 30 tablet 11    metFORMIN (GLUCOPHAGE) 1000 MG tablet Take 1 tablet (1,000 mg total) by mouth 2 (two) times daily with meals. 60 tablet 6    nitroGLYCERIN (NITROSTAT) 0.4 MG SL tablet Place 1 tablet (0.4 mg  "total) under the tongue every 5 (five) minutes as needed for Chest pain. 30 tablet 1    pen needle, diabetic 31 gauge x 3/16" Ndle Inject 1 each into the skin 3 (three) times daily before meals. 100 each 12       Past Surgical History:   Procedure Laterality Date    COLONOSCOPY N/A 2/17/2017    Procedure: COLONOSCOPY;  Surgeon: Simon Gomez MD;  Location: [address]);  Service: Endoscopy;  Laterality: N/A;    CORONARY ANGIOPLASTY      TONSILLECTOMY         Social History     Social History    Marital status:      Spouse name: N/A    Number of children: N/A    Years of education: N/A     Occupational History    Not on file.     Social History Main Topics    Smoking status: Never Smoker    Smokeless tobacco: Never Used    Alcohol use Yes      Comment: rare x1 beer-     Drug use: No    Sexual activity: Not Currently     Other Topics Concern    Not on file     Social History Narrative    No narrative on file       OBJECTIVE:     Vital Signs Range (Last 24H):  Temp:  [37 °C (98.6 °F)-38.6 °C (101.4 °F)]   Pulse:  [67-77]   Resp:  [14-18]   BP: (114-136)/(58-75)   SpO2:  [92 %-96 %]       CBC:   Recent Labs      06/03/18   0443  06/04/18   0508   WBC  13.45*  13.18*   RBC  4.05*  4.15*   HGB  11.9*  12.2*   HCT  35.2*  35.7*   PLT  259  284   MCV  87  86   MCH  29.4  29.4   MCHC  33.8  34.2       CMP:   Recent Labs      06/02/18   0506  06/03/18   0443   NA  132*  132*   K  4.0  3.8   CL  98  99   CO2  24  23   BUN  17  23*   CREATININE  1.0  1.0   GLU  239*  150*   MG  1.8  1.8   PHOS  3.6  4.0   CALCIUM  8.8  8.3*   ALBUMIN  2.4*  2.1*   PROT  6.5  6.0   ALKPHOS  112  89   ALT  17  15   AST  14  18   BILITOT  0.8  0.5       INR:  No results for input(s): PT, INR, PROTIME, APTT in the last 72 hours.    Diagnostic Studies: No relevant studies.    EKG: No recent studies available.    2D ECHO:  Results for orders placed or performed during the hospital encounter of 05/22/13   2D " Echocardiogram with Color Flow Doppler   Result Value Ref Range    EF 60     Diastolic Dysfunction No          ASSESSMENT/PLAN:         Anesthesia Evaluation    I have reviewed the Patient Summary Reports.    I have reviewed the Nursing Notes.   I have reviewed the Medications.     Review of Systems  Anesthesia Hx:  Denies Family Hx of Anesthesia complications.   Denies Personal Hx of Anesthesia complications.   Hematology/Oncology:         -- Denies Anemia: --  Cancer in past history:    Cardiovascular:   Hypertension Past MI CAD  CABG/stent      PVD hyperlipidemia    Pulmonary:   Sleep Apnea    Hepatic/GI:   Denies GERD.    Neurological:   Denies CVA. Denies Seizures.    Endocrine:   Diabetes, type 2    Psych:  Psychiatric Normal           Physical Exam  General:  Well nourished    Airway/Jaw/Neck:  Airway Findings: Mouth Opening: Normal Tongue: Normal  General Airway Assessment: Adult  Mallampati: II  Jaw/Neck Findings:     Neck ROM: Normal ROM  Neck Findings:  Girth Increased, Short Neck      Dental:  Dental Findings: Periodontal disease, Mild    Chest/Lungs:  Chest/Lungs Clear    Heart/Vascular:  Heart Findings: Normal Heart murmur: negative       Mental Status:  Mental Status Findings:  Cooperative, Alert and Oriented         Anesthesia Plan  Type of Anesthesia, risks & benefits discussed:  Anesthesia Type:  general, regional  Patient's Preference:   Intra-op Monitoring Plan: standard ASA monitors  Intra-op Monitoring Plan Comments:   Post Op Pain Control Plan: per primary service following discharge from PACU  Post Op Pain Control Plan Comments:   Induction:    Beta Blocker:  Patient is on a Beta-Blocker and has received one dose within the past 24 hours (No further documentation required).       Informed Consent: Patient understands risks and agrees with Anesthesia plan.  Questions answered. Anesthesia consent signed with patient.  ASA Score: 3     Day of Surgery Review of History & Physical:    H&P update  referred to the provider.         Ready For Surgery From Anesthesia Perspective.

## 2018-06-05 ENCOUNTER — ANESTHESIA (OUTPATIENT)
Dept: SURGERY | Facility: HOSPITAL | Age: 53
DRG: 982 | End: 2018-06-05
Payer: COMMERCIAL

## 2018-06-05 ENCOUNTER — SURGERY (OUTPATIENT)
Age: 53
End: 2018-06-05

## 2018-06-05 PROBLEM — R19.7 DIARRHEA: Status: ACTIVE | Noted: 2018-06-04

## 2018-06-05 LAB
ALBUMIN SERPL BCP-MCNC: 2 G/DL
ALP SERPL-CCNC: 89 U/L
ALT SERPL W/O P-5'-P-CCNC: 21 U/L
ANION GAP SERPL CALC-SCNC: 9 MMOL/L
AST SERPL-CCNC: 25 U/L
BASOPHILS # BLD AUTO: 0.04 K/UL
BASOPHILS NFR BLD: 0.3 %
BILIRUB SERPL-MCNC: 0.5 MG/DL
BUN SERPL-MCNC: 11 MG/DL
CALCIUM SERPL-MCNC: 8.2 MG/DL
CHLORIDE SERPL-SCNC: 97 MMOL/L
CO2 SERPL-SCNC: 27 MMOL/L
CREAT SERPL-MCNC: 0.8 MG/DL
DIFFERENTIAL METHOD: ABNORMAL
EOSINOPHIL # BLD AUTO: 0.2 K/UL
EOSINOPHIL NFR BLD: 1.3 %
ERYTHROCYTE [DISTWIDTH] IN BLOOD BY AUTOMATED COUNT: 12 %
EST. GFR  (AFRICAN AMERICAN): >60 ML/MIN/1.73 M^2
EST. GFR  (NON AFRICAN AMERICAN): >60 ML/MIN/1.73 M^2
GLUCOSE SERPL-MCNC: 171 MG/DL
HCT VFR BLD AUTO: 34.9 %
HGB BLD-MCNC: 11.8 G/DL
IMM GRANULOCYTES # BLD AUTO: 0.21 K/UL
IMM GRANULOCYTES NFR BLD AUTO: 1.7 %
LYMPHOCYTES # BLD AUTO: 1.4 K/UL
LYMPHOCYTES NFR BLD: 11 %
MAGNESIUM SERPL-MCNC: 1.9 MG/DL
MCH RBC QN AUTO: 29.3 PG
MCHC RBC AUTO-ENTMCNC: 33.8 G/DL
MCV RBC AUTO: 87 FL
MONOCYTES # BLD AUTO: 1.5 K/UL
MONOCYTES NFR BLD: 12.1 %
NEUTROPHILS # BLD AUTO: 9.1 K/UL
NEUTROPHILS NFR BLD: 73.6 %
NRBC BLD-RTO: 0 /100 WBC
PHOSPHATE SERPL-MCNC: 3.2 MG/DL
PLATELET # BLD AUTO: 272 K/UL
PMV BLD AUTO: 9.2 FL
POCT GLUCOSE: 133 MG/DL (ref 70–110)
POCT GLUCOSE: 138 MG/DL (ref 70–110)
POCT GLUCOSE: 147 MG/DL (ref 70–110)
POCT GLUCOSE: 187 MG/DL (ref 70–110)
POCT GLUCOSE: 257 MG/DL (ref 70–110)
POTASSIUM SERPL-SCNC: 3.9 MMOL/L
PROT SERPL-MCNC: 6.1 G/DL
RBC # BLD AUTO: 4.03 M/UL
SODIUM SERPL-SCNC: 133 MMOL/L
WBC # BLD AUTO: 12.35 K/UL

## 2018-06-05 PROCEDURE — 25000003 PHARM REV CODE 250: Performed by: NURSE ANESTHETIST, CERTIFIED REGISTERED

## 2018-06-05 PROCEDURE — 25000003 PHARM REV CODE 250: Performed by: STUDENT IN AN ORGANIZED HEALTH CARE EDUCATION/TRAINING PROGRAM

## 2018-06-05 PROCEDURE — 36000704 HC OR TIME LEV I 1ST 15 MIN: Performed by: PODIATRIST

## 2018-06-05 PROCEDURE — 71000015 HC POSTOP RECOV 1ST HR: Performed by: PODIATRIST

## 2018-06-05 PROCEDURE — 37000008 HC ANESTHESIA 1ST 15 MINUTES: Performed by: PODIATRIST

## 2018-06-05 PROCEDURE — 85025 COMPLETE CBC W/AUTO DIFF WBC: CPT

## 2018-06-05 PROCEDURE — 63600175 PHARM REV CODE 636 W HCPCS: Performed by: NURSE ANESTHETIST, CERTIFIED REGISTERED

## 2018-06-05 PROCEDURE — 36000705 HC OR TIME LEV I EA ADD 15 MIN: Performed by: PODIATRIST

## 2018-06-05 PROCEDURE — 82962 GLUCOSE BLOOD TEST: CPT | Performed by: PODIATRIST

## 2018-06-05 PROCEDURE — 37000009 HC ANESTHESIA EA ADD 15 MINS: Performed by: PODIATRIST

## 2018-06-05 PROCEDURE — 0HBMXZZ EXCISION OF RIGHT FOOT SKIN, EXTERNAL APPROACH: ICD-10-PCS | Performed by: PODIATRIST

## 2018-06-05 PROCEDURE — D9220A PRA ANESTHESIA: Mod: ,,, | Performed by: ANESTHESIOLOGY

## 2018-06-05 PROCEDURE — 99233 SBSQ HOSP IP/OBS HIGH 50: CPT | Mod: ,,, | Performed by: HOSPITALIST

## 2018-06-05 PROCEDURE — 71000033 HC RECOVERY, INTIAL HOUR: Performed by: PODIATRIST

## 2018-06-05 PROCEDURE — 63600175 PHARM REV CODE 636 W HCPCS: Performed by: PHYSICIAN ASSISTANT

## 2018-06-05 PROCEDURE — 25000003 PHARM REV CODE 250: Performed by: PODIATRIST

## 2018-06-05 PROCEDURE — 99233 SBSQ HOSP IP/OBS HIGH 50: CPT | Mod: ,,, | Performed by: INTERNAL MEDICINE

## 2018-06-05 PROCEDURE — 36415 COLL VENOUS BLD VENIPUNCTURE: CPT

## 2018-06-05 PROCEDURE — 83735 ASSAY OF MAGNESIUM: CPT

## 2018-06-05 PROCEDURE — 80053 COMPREHEN METABOLIC PANEL: CPT

## 2018-06-05 PROCEDURE — 84100 ASSAY OF PHOSPHORUS: CPT

## 2018-06-05 PROCEDURE — 10061 I&D ABSCESS COMP/MULTIPLE: CPT | Mod: ,,, | Performed by: PODIATRIST

## 2018-06-05 PROCEDURE — S0077 INJECTION, CLINDAMYCIN PHOSP: HCPCS | Performed by: NURSE ANESTHETIST, CERTIFIED REGISTERED

## 2018-06-05 PROCEDURE — 63600175 PHARM REV CODE 636 W HCPCS: Performed by: STUDENT IN AN ORGANIZED HEALTH CARE EDUCATION/TRAINING PROGRAM

## 2018-06-05 PROCEDURE — 11000001 HC ACUTE MED/SURG PRIVATE ROOM

## 2018-06-05 RX ORDER — LIDOCAINE HYDROCHLORIDE 10 MG/ML
INJECTION INFILTRATION; PERINEURAL
Status: DISCONTINUED | OUTPATIENT
Start: 2018-06-05 | End: 2018-06-05 | Stop reason: HOSPADM

## 2018-06-05 RX ORDER — LIDOCAINE HYDROCHLORIDE 10 MG/ML
INJECTION INFILTRATION; PERINEURAL
Status: DISPENSED
Start: 2018-06-05 | End: 2018-06-05

## 2018-06-05 RX ORDER — PROPOFOL 10 MG/ML
VIAL (ML) INTRAVENOUS
Status: DISCONTINUED | OUTPATIENT
Start: 2018-06-05 | End: 2018-06-05

## 2018-06-05 RX ORDER — CLINDAMYCIN PHOSPHATE 900 MG/50ML
INJECTION, SOLUTION INTRAVENOUS
Status: COMPLETED
Start: 2018-06-05 | End: 2018-06-05

## 2018-06-05 RX ORDER — FENTANYL CITRATE 50 UG/ML
100 INJECTION, SOLUTION INTRAMUSCULAR; INTRAVENOUS EVERY 5 MIN PRN
Status: DISCONTINUED | OUTPATIENT
Start: 2018-06-05 | End: 2018-06-05 | Stop reason: HOSPADM

## 2018-06-05 RX ORDER — BUPIVACAINE HYDROCHLORIDE 5 MG/ML
INJECTION, SOLUTION EPIDURAL; INTRACAUDAL
Status: DISPENSED
Start: 2018-06-05 | End: 2018-06-05

## 2018-06-05 RX ORDER — SODIUM CHLORIDE 9 MG/ML
INJECTION, SOLUTION INTRAVENOUS CONTINUOUS PRN
Status: DISCONTINUED | OUTPATIENT
Start: 2018-06-05 | End: 2018-06-05

## 2018-06-05 RX ORDER — KETAMINE HCL IN 0.9 % NACL 50 MG/5 ML
SYRINGE (ML) INTRAVENOUS
Status: DISPENSED
Start: 2018-06-05 | End: 2018-06-05

## 2018-06-05 RX ORDER — ONDANSETRON 2 MG/ML
INJECTION INTRAMUSCULAR; INTRAVENOUS
Status: DISCONTINUED | OUTPATIENT
Start: 2018-06-05 | End: 2018-06-05

## 2018-06-05 RX ORDER — MIDAZOLAM HYDROCHLORIDE 1 MG/ML
INJECTION, SOLUTION INTRAMUSCULAR; INTRAVENOUS
Status: DISCONTINUED | OUTPATIENT
Start: 2018-06-05 | End: 2018-06-05

## 2018-06-05 RX ORDER — KETAMINE HYDROCHLORIDE 10 MG/ML
INJECTION, SOLUTION INTRAMUSCULAR; INTRAVENOUS
Status: DISCONTINUED | OUTPATIENT
Start: 2018-06-05 | End: 2018-06-05

## 2018-06-05 RX ORDER — PROPOFOL 10 MG/ML
VIAL (ML) INTRAVENOUS CONTINUOUS PRN
Status: DISCONTINUED | OUTPATIENT
Start: 2018-06-05 | End: 2018-06-05

## 2018-06-05 RX ORDER — SODIUM CHLORIDE 0.9 % (FLUSH) 0.9 %
3 SYRINGE (ML) INJECTION
Status: DISCONTINUED | OUTPATIENT
Start: 2018-06-05 | End: 2018-06-05 | Stop reason: HOSPADM

## 2018-06-05 RX ORDER — LIDOCAINE HCL/PF 100 MG/5ML
SYRINGE (ML) INTRAVENOUS
Status: DISCONTINUED | OUTPATIENT
Start: 2018-06-05 | End: 2018-06-05

## 2018-06-05 RX ORDER — CLINDAMYCIN PHOSPHATE 900 MG/50ML
INJECTION, SOLUTION INTRAVENOUS
Status: DISCONTINUED | OUTPATIENT
Start: 2018-06-05 | End: 2018-06-05

## 2018-06-05 RX ORDER — ENOXAPARIN SODIUM 100 MG/ML
40 INJECTION SUBCUTANEOUS EVERY 24 HOURS
Status: DISCONTINUED | OUTPATIENT
Start: 2018-06-05 | End: 2018-06-15 | Stop reason: HOSPADM

## 2018-06-05 RX ORDER — BUPIVACAINE HYDROCHLORIDE AND EPINEPHRINE 5; 5 MG/ML; UG/ML
INJECTION, SOLUTION EPIDURAL; INTRACAUDAL; PERINEURAL
Status: DISCONTINUED | OUTPATIENT
Start: 2018-06-05 | End: 2018-06-05 | Stop reason: HOSPADM

## 2018-06-05 RX ADMIN — Medication 1 CAPSULE: at 03:06

## 2018-06-05 RX ADMIN — LIDOCAINE HYDROCHLORIDE 10 ML: 10 INJECTION, SOLUTION INFILTRATION; PERINEURAL at 12:06

## 2018-06-05 RX ADMIN — INSULIN ASPART 15 UNITS: 100 INJECTION, SOLUTION INTRAVENOUS; SUBCUTANEOUS at 04:06

## 2018-06-05 RX ADMIN — MIDAZOLAM HYDROCHLORIDE 2 MG: 1 INJECTION, SOLUTION INTRAMUSCULAR; INTRAVENOUS at 11:06

## 2018-06-05 RX ADMIN — CLINDAMYCIN PHOSPHATE 900 MG: 18 INJECTION, SOLUTION INTRAVENOUS at 12:06

## 2018-06-05 RX ADMIN — KETAMINE HYDROCHLORIDE 10 MG: 10 INJECTION, SOLUTION INTRAMUSCULAR; INTRAVENOUS at 12:06

## 2018-06-05 RX ADMIN — CEFAZOLIN 2 G: 1 INJECTION, POWDER, FOR SOLUTION INTRAMUSCULAR; INTRAVENOUS at 02:06

## 2018-06-05 RX ADMIN — ACETAMINOPHEN 650 MG: 325 TABLET ORAL at 03:06

## 2018-06-05 RX ADMIN — SODIUM CHLORIDE: 0.9 INJECTION, SOLUTION INTRAVENOUS at 11:06

## 2018-06-05 RX ADMIN — BUPIVACAINE HYDROCHLORIDE AND EPINEPHRINE BITARTRATE 10 ML: 5; .0091 INJECTION, SOLUTION EPIDURAL; INTRACAUDAL; PERINEURAL at 12:06

## 2018-06-05 RX ADMIN — CEFAZOLIN 2 G: 1 INJECTION, POWDER, FOR SOLUTION INTRAMUSCULAR; INTRAVENOUS at 06:06

## 2018-06-05 RX ADMIN — ENOXAPARIN SODIUM 40 MG: 100 INJECTION SUBCUTANEOUS at 05:06

## 2018-06-05 RX ADMIN — CEFAZOLIN 2 G: 1 INJECTION, POWDER, FOR SOLUTION INTRAMUSCULAR; INTRAVENOUS at 09:06

## 2018-06-05 RX ADMIN — OXYCODONE HYDROCHLORIDE 5 MG: 5 TABLET ORAL at 05:06

## 2018-06-05 RX ADMIN — LIDOCAINE HYDROCHLORIDE 50 MG: 20 INJECTION, SOLUTION INTRAVENOUS at 12:06

## 2018-06-05 RX ADMIN — CARVEDILOL 12.5 MG: 12.5 TABLET, FILM COATED ORAL at 08:06

## 2018-06-05 RX ADMIN — INSULIN ASPART 3 UNITS: 100 INJECTION, SOLUTION INTRAVENOUS; SUBCUTANEOUS at 10:06

## 2018-06-05 RX ADMIN — LISINOPRIL 40 MG: 20 TABLET ORAL at 08:06

## 2018-06-05 RX ADMIN — PROPOFOL 80 MG: 10 INJECTION, EMULSION INTRAVENOUS at 12:06

## 2018-06-05 RX ADMIN — PROPOFOL 150 MCG/KG/MIN: 10 INJECTION, EMULSION INTRAVENOUS at 12:06

## 2018-06-05 RX ADMIN — ONDANSETRON 4 MG: 2 INJECTION INTRAMUSCULAR; INTRAVENOUS at 01:06

## 2018-06-05 RX ADMIN — KETAMINE HYDROCHLORIDE 20 MG: 10 INJECTION, SOLUTION INTRAMUSCULAR; INTRAVENOUS at 12:06

## 2018-06-05 RX ADMIN — ACETAMINOPHEN 650 MG: 325 TABLET ORAL at 04:06

## 2018-06-05 RX ADMIN — INSULIN DETEMIR 45 UNITS: 100 INJECTION, SOLUTION SUBCUTANEOUS at 09:06

## 2018-06-05 RX ADMIN — CARVEDILOL 12.5 MG: 12.5 TABLET, FILM COATED ORAL at 09:06

## 2018-06-05 RX ADMIN — ACETAMINOPHEN 650 MG: 325 TABLET ORAL at 09:06

## 2018-06-05 RX ADMIN — ATORVASTATIN CALCIUM 80 MG: 20 TABLET, FILM COATED ORAL at 08:06

## 2018-06-05 NOTE — ASSESSMENT & PLAN NOTE
- On ASA 81 mg daily and atorvastatin 80 mg daily at home.  - Currently stable. Asymptomatic.  - Continue home ASA.  - Continue home statin.

## 2018-06-05 NOTE — ASSESSMENT & PLAN NOTE
- WBC persistently ~13. Likely due to acute inflammation vs uncontrolled cellulitis, given recurrent fever.  - Monitor.

## 2018-06-05 NOTE — ASSESSMENT & PLAN NOTE
- On lisinopril 40 mg daily and carvedilol 12.5 mg BID at home.  - BP currently low 100s-120s/50s-70s, at goal.  - Continue home ACE-I.  - Continue home BB.  - Monitor.

## 2018-06-05 NOTE — ASSESSMENT & PLAN NOTE
52-year-old male admitted with right foot infection after penetrating glass trauma that failed outpatient antibiotics alone. In the ED patient was noted to have a WBC of 15K, , . MRI showed diffuse subcutaneous and myofascial forefoot edema, fluid collection with associated radiopaque foreign body. No osteomyelitis. He was taken to the OR 6/2 for washout with podiatry. Copious amounts of purulent drainage noted with tracking no to dorsal foot at 1st interdigital space. Surgical cultures are growing GBS. He is currently on IV Cefazolin. Overall is doing well but still having post operative fevers. Post op x-ray showed retained foreign body. He is scheduled to undergo repeat washout today.      Plan  - Continue Cefazolin 2 g IV q 8 hours. Tolerating well without any adverse reactions.   - Repeat washout today for removal of retained foreign body. If purulence is seen in the OR, please obtain new surgical cultures.    - As his foot has been washed out and there is no evidence of osteomyelitis, can likely discharge on oral antibiotics if continues to clinically improve.  - Recommend Allergy follow up as an outpatient for PCN skin testing   - Recommend leg elevation.   - ID will follow.

## 2018-06-05 NOTE — ANESTHESIA POSTPROCEDURE EVALUATION
"Anesthesia Post Evaluation    Patient: Billy iRvera    Procedure(s) Performed: Procedure(s) (LRB):  INCISION AND DRAINAGE, FOOT (Right)    Final Anesthesia Type: general  Patient location during evaluation: PACU  Patient participation: Yes- Able to Participate  Level of consciousness: awake and alert  Post-procedure vital signs: reviewed and stable  Pain management: adequate  Airway patency: patent  PONV status at discharge: No PONV  Anesthetic complications: no      Cardiovascular status: blood pressure returned to baseline  Respiratory status: unassisted, room air and spontaneous ventilation  Hydration status: euvolemic  Follow-up not needed.        Visit Vitals  /62 (BP Location: Right arm, Patient Position: Lying)   Pulse 72   Temp 37.8 °C (100.1 °F) (Oral)   Resp 20   Ht 6' 1" (1.854 m)   Wt 122.5 kg (270 lb)   SpO2 98%   BMI 35.62 kg/m²       Pain/Gabriel Score: Pain Assessment Performed: Yes (6/5/2018  3:48 PM)  Presence of Pain: denies (6/5/2018  3:48 PM)  Pain Rating Prior to Med Admin: 0 (6/5/2018  3:48 PM)  Pain Rating Post Med Admin: 1 (6/4/2018  4:06 PM)  Gabriel Score: 10 (6/5/2018  2:45 PM)      "

## 2018-06-05 NOTE — TRANSFER OF CARE
"Anesthesia Transfer of Care Note    Patient: Billy Rivera    Procedure(s) Performed: Procedure(s) (LRB):  INCISION AND DRAINAGE, FOOT (Right)    Patient location: PACU    Anesthesia Type: general    Transport from OR: Transported from OR on 6-10 L/min O2 by face mask with adequate spontaneous ventilation    Post pain: adequate analgesia    Post assessment: no apparent anesthetic complications    Post vital signs: stable    Level of consciousness: awake    Nausea/Vomiting: no nausea/vomiting    Complications: none    Transfer of care protocol was followed      Last vitals:   Visit Vitals  BP (!) 102/56 (BP Location: Left arm, Patient Position: Sitting)   Pulse (!) 55   Temp 36.6 °C (97.9 °F) (Temporal)   Resp 13   Ht 6' 1" (1.854 m)   Wt 122.5 kg (270 lb)   SpO2 100%   BMI 35.62 kg/m²     "

## 2018-06-05 NOTE — ASSESSMENT & PLAN NOTE
- Initially presented with diarrhea, likely side effect of PO abx outpatient. No BM from day of admission to day 4, likely opiate-induced ileus. Recurrence of diarrhea on day 5, again likely side effect of abx use. Low suspicion for c diff, given mild leukocytosis and lack of abdominal pain.  - C diff work up.  - Discontinue bowel regimen.  - Start probiotics.

## 2018-06-05 NOTE — ASSESSMENT & PLAN NOTE
- Recurrent episodes of temp 100-101 F throughout course. Likely due to foreign object embedded in wound. Ddx includes drug fever, given pt currently on cephalosporin in the setting of severe PCN allergy. Low suspicion for urinary or pulmonary source of infection, given lack of symptoms/signs of systemic involvement.  - Tylenol prn.  - Monitor.

## 2018-06-05 NOTE — ASSESSMENT & PLAN NOTE
Cellulitis  Foreign object (glass) in foot  - S/p OR debridement by podiatry on day 2. Aerobic wound culture on 6/2/18 grew strep agalactiae. Anaerobic culture pending. No signs of osteomyelitis. Blood culture NGTD. WBC persistently 13.  - Initially on aztreonam, flagyl, daptomycin on admission. Switched to cefazolin on day 3, per ID recs.  - Repeat XR foot showed residual piece of glass in wound. Unsuccessful attempts to retrieve remaining piece of glass in foot in OR on day 5.  - Continue cefazolin, dose increased to 2 g q8h today per ID recs.  - Plan to discharge home on PO cephalosporin.

## 2018-06-05 NOTE — NURSING TRANSFER
Nursing Transfer Note      6/5/2018     [facility]     Transfer via stretcher    Transfer with N/A    Transported by Priscilla, PCT    Medicines sent: NONE    Chart send with patient: YES     Notified: REGISTERED NURSE     Patient reassessed at: 1445, 6/5/18    Upon arrival to floor: patient oriented to room, call bell in reach and bed in lowest position

## 2018-06-05 NOTE — SUBJECTIVE & OBJECTIVE
Interval History:   Reports multiple episodes of loose stool yesterday afternoon before receiving any bowel regimen. Did not get lactulose or miralax but got 1 dose of senna-docusate last night. Had 1 more episode of loose stool this morning. Reports diarrhea is similar to diarrhea prior to admission while on PO abx. Denies blood in stool.    Review of Systems   Constitutional: Negative for chills, fever and unexpected weight change.   HENT: Negative for hearing loss.    Eyes: Negative for visual disturbance.   Respiratory: Negative for shortness of breath.    Cardiovascular: Negative for chest pain and leg swelling.   Gastrointestinal: Negative for abdominal pain, constipation, diarrhea, nausea and vomiting.   Genitourinary: Negative for dysuria and hematuria.   Musculoskeletal: Negative for arthralgias.   Skin: Positive for color change, rash and wound.   Neurological: Negative for seizures and weakness.   Hematological: Negative for adenopathy. Does not bruise/bleed easily.     Objective:     Vital Signs (Most Recent):  Temp: 100.1 °F (37.8 °C) (06/05/18 1542)  Pulse: 72 (06/05/18 1542)  Resp: 20 (06/05/18 1542)  BP: 117/62 (06/05/18 1542)  SpO2: 98 % (06/05/18 1542) Vital Signs (24h Range):  Temp:  [97.9 °F (36.6 °C)-101.8 °F (38.8 °C)] 100.1 °F (37.8 °C)  Pulse:  [55-90] 72  Resp:  [13-20] 20  SpO2:  [90 %-100 %] 98 %  BP: (102-127)/(55-76) 117/62     Weight: 122.5 kg (270 lb)  Body mass index is 35.62 kg/m².    Intake/Output Summary (Last 24 hours) at 06/05/18 1639  Last data filed at 06/05/18 1445   Gross per 24 hour   Intake             1160 ml   Output             1100 ml   Net               60 ml      Physical Exam   Constitutional: He is oriented to person, place, and time. He appears well-developed and well-nourished. No distress.   HENT:   Head: Normocephalic and atraumatic.   Eyes: Conjunctivae, EOM and lids are normal.   Neck: No tracheal deviation present. No thyromegaly present.   Cardiovascular:  Normal rate, regular rhythm, normal heart sounds and intact distal pulses.    No murmur heard.  Pulmonary/Chest: Effort normal and breath sounds normal. No respiratory distress. He has no wheezes. He has no rales.   Abdominal: Soft. He exhibits no distension and no mass. There is no tenderness. No hernia.   Musculoskeletal: He exhibits tenderness. He exhibits no edema.   Right foot bandage, intact, clean, dry. Point tenderness on sole of foot at location of wound.   Neurological: He is alert and oriented to person, place, and time. No cranial nerve deficit. He exhibits normal muscle tone.   Skin: Skin is warm and dry. Rash noted. He is not diaphoretic. There is erythema.   RLE erythema up to mid shin, no change from marked border. Non tender, non edematous.   Psychiatric: He has a normal mood and affect.   Vitals reviewed.    Significant Labs: All pertinent labs within the past 24 hours have been reviewed.  CMP:   6/5/2018 04:04   Sodium 133 (L)   Potassium 3.9   Chloride 97   CO2 27   Anion Gap 9   BUN, Bld 11   Creatinine 0.8   eGFR if non African American >60.0   eGFR if African American >60.0   Glucose 171 (H)   Calcium 8.2 (L)   Phosphorus 3.2   Magnesium 1.9   Alkaline Phosphatase 89   Total Protein 6.1   Albumin 2.0 (L)   Total Bilirubin 0.5   AST 25   ALT 21     CBC:   6/5/2018 04:04   WBC 12.35   RBC 4.03 (L)   Hemoglobin 11.8 (L)   Hematocrit 34.9 (L)   MCV 87   MCH 29.3   MCHC 33.8   RDW 12.0   Platelets 272   MPV 9.2   Gran% 73.6 (H)   Gran # (ANC) 9.1 (H)   Immature Granulocytes 1.7 (H)   Immature Grans (Abs) 0.21 (H)   Lymph% 11.0 (L)   Lymph # 1.4   Mono% 12.1   Mono # 1.5 (H)   Eosinophil% 1.3   Eos # 0.2   Basophil% 0.3   Baso # 0.04   nRBC 0     DM panel:   6/4/2018 12:30 6/4/2018 17:11 6/4/2018 20:49 6/5/2018 08:14 6/5/2018 10:59 6/5/2018 14:01   POCT Glucose 214 (H) 208 (H) 238 (H) 133 (H) 138 (H) 147 (H)     Significant Imaging: I have reviewed and interpreted all pertinent imaging  results/findings within the past 24 hours.   No new imaging.

## 2018-06-05 NOTE — NURSING
PATIENT ARRIVED BACK TO UNIT VIA STRETCHER WITH TRANSPORTER IN PLACE. ELEVATED TEMP .1 NOTED. PRIMARY TEAM MD MADE AWARE. UNDERSTANDING VERBALIZED. PRN TYLENOL ADMINISTERED. ALL OTHER VITAL SIGNS WITHIN NORMAL LIMITS. SURGICAL SITE DRESSING IN PLACE. NO COMPLAINTS REPORTED. NO S/S OF DISTRESS NOTED. WILL AWAIT NEW ORDERS AND CONTINUE TO MONITOR PATIENT'S STATUS.

## 2018-06-05 NOTE — ASSESSMENT & PLAN NOTE
- Uncontrolled. Hgb 12 on admission (previously 9.7 in 2/2018). On insulin glargine 55 units, aspart 20 units TIDWM, metformin, trulicity at home.  - 's, anion gap 14 on admission.  - BG currently mid 100s to 210s on 80% home insulin regimen, at goal.  - Continue insulin detemir 45 units daily.  - Continue insulin aspart 15 units TIDWM.  - POCT glucose AC & HS.  - Holding home metformin and trulicity while in house.

## 2018-06-05 NOTE — ASSESSMENT & PLAN NOTE
- Recurrent episodes of temp 100-101 F post op. Ddx includes drug fever, given pt on cephalosporin in the setting of severe PCN allergy.  - Tylenol prn.  - Monitor.

## 2018-06-05 NOTE — ASSESSMENT & PLAN NOTE
- On lisinopril 40 mg daily and carvedilol 12.5 mg BID at home.  - BP currently low 100s-130s.  - Continue home ACE-I.  - Continue home BB.  - Monitor.

## 2018-06-05 NOTE — ASSESSMENT & PLAN NOTE
Cellulitis  Foreign object (glass) in foot  - S/p OR debridement by podiatry on day 2. Aerobic wound culture on 6/2/18 grew strep agalactiae. Anaerobic culture pending. No signs of osteomyelitis. Blood culture NGTD. WBC persistently 13.  - Initially on aztreonam, flagyl, daptomycin on admission. Switched to cefazolin on day 3, per ID recs.  - Repeat XR foot showed residual piece of glass in wound.  - Plan for OR removal of residual glass in wound tomorrow by podiatry.  - Continue cefazolin, dose increased to 2 g q8h today per ID recs.  - Plan to discharge home on PO cephalosporin.

## 2018-06-05 NOTE — PROGRESS NOTES
[facility] Hospital Medicine  Progress Note    Patient Name: Billy Sheffield Miguel  MRN: [id number]  Patient Class: IP- Inpatient   Admission Date: 6/1/2018  Length of Stay: 4 days  Attending Physician: Josiah Sawyer, *  Primary Care Provider: BRAD Baker MD    [facility] Medicine Team: [facility] HOSP MED 2 Millicent Torre MD    Subjective:     Principal Problem:Diabetic ulcer of right midfoot    HPI:  Patient is a 52-year-old male with HTN, MI 2013 s/p 1 stent, DMT2, colon cancer s/p resection 2017 who presents after failing outpatient treatment for a right ulcer.  Patient states that he stepped on a piece of glass at work where he is  on the 19th of May and it went through his shoe. At that time he removed the glass, used antiseptic and antibacterial on his foot and felt that things are going OK. He didn't have any issues until last Saturday The 26th, when he noted that a puffy cherry blister was occurring on his plantar surface near the first digit. On Tuesday, the 29th he presented to his primary care physician in [place] where he was started on clinda and cipro. Patient notes that the clinda and Cipro gave him diarrhea and vomiting. He stated that he had diarrhea three times a day with some form, mostly liquid, no blood and it has been the same since it started. His vomiting occurred five times yesterday, and was brownish with food. His last diarrhea episode was at 10:30 this morning, and vomiting at 4:30 in the afternoon. The patient presented to his outside PCP today, without relief of symptoms on antibiotics. Patient denies fever, chills, nausea, vomiting, diarrhea, dysuria, rash, shortness of breath, chest pain, abdominal pain.  Patient states that his diabetes started 15 to 20 years ago, and has been reasonably well controlled since then with no neuropathy, optic issues or kidney issues. Patient notes that he had a stent in 2013 for coronary artery disease, and colon cancer  that was resected in April 2017 without any issues on follow up.    Hospital Course:  6/1/2018- admission date. Empirically started on daptomycin due to PCN allergy, aztreonam, flagyl for anaerobe coverage. Diarrhea resolved on admission, thought to be side effect of outpatient abx rather than c diff.  6/2/2018- Received OR debridement by podiatry. Tolerated procedure well. Had fever of 101.9 overnight. WBC persistently ~13.  6/3/2018- Aerobic wound culture grew strep agalactiae. Per ID recs, abx switched to cefazolin with no adverse effects. Fever of 101.4. Repeat XR of foot showed residual piece of glass in foot.  06/04/2018: Had not had any BM since admission. Was started on bowel regimen. However, pt start having diarrhea again, 4 episodes during the day, before receiving any bowel regimen. Lactulose and miralax were not given. Pt received 1 dose of senna-docusate overnight. Had 1 more episode of diarrhea overnight. Persistent hyponatremia with Na+ in low 130s. Urine studies showed low-normal urine sodium and normal osmolality. Fever of 101 recurred for 8 hours, resolved with tylenol. Pt denied symptoms during febrile episode. WBC slightly improved, left shift persisted. Thought to be due to residual foreign object embedded in foot.  06/05/2018: Recurrence of diarrhea thought to be secondary to antibiotics, given similar presentation while pt was previously on PO abx prior to admission. Stool studies were sent to rule out c. diff. Started on probiotics. Went to OR again for removal of remaining piece of glass in foot. However, glass was found to be too deeply embedded in tissue and was unable to be retrieved despite multiple attempts. Immediately post op, pt had fever of 101 again.    Interval History:   Reports multiple episodes of loose stool yesterday afternoon before receiving any bowel regimen. Did not get lactulose or miralax but got 1 dose of senna-docusate last night. Had 1 more episode of loose stool  this morning. Reports diarrhea is similar to diarrhea prior to admission while on PO abx. Denies blood in stool.    Review of Systems   Constitutional: Negative for chills, fever and unexpected weight change.   HENT: Negative for hearing loss.    Eyes: Negative for visual disturbance.   Respiratory: Negative for shortness of breath.    Cardiovascular: Negative for chest pain and leg swelling.   Gastrointestinal: Negative for abdominal pain, constipation, diarrhea, nausea and vomiting.   Genitourinary: Negative for dysuria and hematuria.   Musculoskeletal: Negative for arthralgias.   Skin: Positive for color change, rash and wound.   Neurological: Negative for seizures and weakness.   Hematological: Negative for adenopathy. Does not bruise/bleed easily.     Objective:     Vital Signs (Most Recent):  Temp: 100.1 °F (37.8 °C) (06/05/18 1542)  Pulse: 72 (06/05/18 1542)  Resp: 20 (06/05/18 1542)  BP: 117/62 (06/05/18 1542)  SpO2: 98 % (06/05/18 1542) Vital Signs (24h Range):  Temp:  [97.9 °F (36.6 °C)-101.8 °F (38.8 °C)] 100.1 °F (37.8 °C)  Pulse:  [55-90] 72  Resp:  [13-20] 20  SpO2:  [90 %-100 %] 98 %  BP: (102-127)/(55-76) 117/62     Weight: 122.5 kg (270 lb)  Body mass index is 35.62 kg/m².    Intake/Output Summary (Last 24 hours) at 06/05/18 1639  Last data filed at 06/05/18 1445   Gross per 24 hour   Intake             1160 ml   Output             1100 ml   Net               60 ml      Physical Exam   Constitutional: He is oriented to person, place, and time. He appears well-developed and well-nourished. No distress.   HENT:   Head: Normocephalic and atraumatic.   Eyes: Conjunctivae, EOM and lids are normal.   Neck: No tracheal deviation present. No thyromegaly present.   Cardiovascular: Normal rate, regular rhythm, normal heart sounds and intact distal pulses.    No murmur heard.  Pulmonary/Chest: Effort normal and breath sounds normal. No respiratory distress. He has no wheezes. He has no rales.   Abdominal:  Soft. He exhibits no distension and no mass. There is no tenderness. No hernia.   Musculoskeletal: He exhibits tenderness. He exhibits no edema.   Right foot bandage, intact, clean, dry. Point tenderness on sole of foot at location of wound.   Neurological: He is alert and oriented to person, place, and time. No cranial nerve deficit. He exhibits normal muscle tone.   Skin: Skin is warm and dry. Rash noted. He is not diaphoretic. There is erythema.   RLE erythema up to mid shin, no change from marked border. Non tender, non edematous.   Psychiatric: He has a normal mood and affect.   Vitals reviewed.    Significant Labs: All pertinent labs within the past 24 hours have been reviewed.  CMP:   6/5/2018 04:04   Sodium 133 (L)   Potassium 3.9   Chloride 97   CO2 27   Anion Gap 9   BUN, Bld 11   Creatinine 0.8   eGFR if non African American >60.0   eGFR if African American >60.0   Glucose 171 (H)   Calcium 8.2 (L)   Phosphorus 3.2   Magnesium 1.9   Alkaline Phosphatase 89   Total Protein 6.1   Albumin 2.0 (L)   Total Bilirubin 0.5   AST 25   ALT 21     CBC:   6/5/2018 04:04   WBC 12.35   RBC 4.03 (L)   Hemoglobin 11.8 (L)   Hematocrit 34.9 (L)   MCV 87   MCH 29.3   MCHC 33.8   RDW 12.0   Platelets 272   MPV 9.2   Gran% 73.6 (H)   Gran # (ANC) 9.1 (H)   Immature Granulocytes 1.7 (H)   Immature Grans (Abs) 0.21 (H)   Lymph% 11.0 (L)   Lymph # 1.4   Mono% 12.1   Mono # 1.5 (H)   Eosinophil% 1.3   Eos # 0.2   Basophil% 0.3   Baso # 0.04   nRBC 0     DM panel:   6/4/2018 12:30 6/4/2018 17:11 6/4/2018 20:49 6/5/2018 08:14 6/5/2018 10:59 6/5/2018 14:01   POCT Glucose 214 (H) 208 (H) 238 (H) 133 (H) 138 (H) 147 (H)     Significant Imaging: I have reviewed and interpreted all pertinent imaging results/findings within the past 24 hours.   No new imaging.    Assessment/Plan:      * Diabetic ulcer of right midfoot    Cellulitis  Foreign object (glass) in foot  - S/p OR debridement by podiatry on day 2. Aerobic wound culture on  6/2/18 grew strep agalactiae. Anaerobic culture pending. No signs of osteomyelitis. Blood culture NGTD. WBC persistently 13.  - Initially on aztreonam, flagyl, daptomycin on admission. Switched to cefazolin on day 3, per ID recs.  - Repeat XR foot showed residual piece of glass in wound. Unsuccessful attempts to retrieve remaining piece of glass in foot in OR on day 5.  - Continue cefazolin, dose increased to 2 g q8h today per ID recs.  - Plan to discharge home on PO cephalosporin.        Fever    - Recurrent episodes of temp 100-101 F throughout course. Likely due to foreign object embedded in wound. Ddx includes drug fever, given pt currently on cephalosporin in the setting of severe PCN allergy. Low suspicion for urinary or pulmonary source of infection, given lack of symptoms/signs of systemic involvement.  - Tylenol prn.  - Monitor.        Leukocytosis    - WBC persistently ~13. Likely due to acute inflammation vs uncontrolled cellulitis, given recurrent fever.  - Monitor.        Type 2 diabetes mellitus 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, with long-term current use of insulin    - Uncontrolled. Hgb 12 on admission (previously 9.7 in 2/2018). On insulin glargine 55 units, aspart 20 units TIDWM, metformin, trulicity at home.  - 's, anion gap 14 on admission.  - BG currently mid 100s to 210s on 80% home insulin regimen, at goal.  - Continue insulin detemir 45 units daily.  - Continue insulin aspart 15 units TIDWM.  - POCT glucose AC & HS.  - Holding home metformin and trulicity while in house.        HTN (hypertension)    - On lisinopril 40 mg daily and carvedilol 12.5 mg BID at home.  - BP currently low 100s-120s/50s-70s, at goal.  - Continue home ACE-I.  - Continue home BB.  - Monitor.        Diarrhea    - Initially presented with diarrhea, likely side effect of PO abx outpatient. No BM from day of admission to day 4, likely opiate-induced ileus.  Recurrence of diarrhea on day 5, again likely side effect of abx use. Low suspicion for c diff, given mild leukocytosis and lack of abdominal pain.  - C diff work up.  - Discontinue bowel regimen.  - Start probiotics.        CAD s/p PCI of LAD (SHELYB) in May 2013    - On ASA 81 mg daily and atorvastatin 80 mg daily at home.  - Currently stable. Asymptomatic.  - Continue home ASA.  - Continue home statin.        Dyslipidemia    - On atorvastatin 80 mg daily at home.  - Continue home statin.          VTE Risk Mitigation         Ordered     IP VTE HIGH RISK PATIENT  Once      06/02/18 0935        Millicent Torre MD  Department of Hospital Medicine   [facility]

## 2018-06-05 NOTE — NURSING
PATIENT OFF UNIT TO OR. PERSONAL CLOTHING/ITEMS REMOVED AND HOSPITAL GOWN PLACED UPON PATIENT. NO COMPLAINTS REPORTED. NO S/S OF DISTRESS NOTED.

## 2018-06-05 NOTE — PROGRESS NOTES
[facility]  Infectious Disease  Progress Note    Patient Name: Billy Rivera  MRN: [id number]  Admission Date: 6/1/2018  Length of Stay: 4 days  Attending Physician: Josiah Sawyer, *  Primary Care Provider: BRAD Baker MD    Isolation Status: Special Contact  Assessment/Plan:      Right foot infection       52-year-old male admitted with right foot infection after penetrating glass trauma that failed outpatient antibiotics alone. In the ED patient was noted to have a WBC of 15K, , . MRI showed diffuse subcutaneous and myofascial forefoot edema, fluid collection with associated radiopaque foreign body. No osteomyelitis. He was taken to the OR 6/2 for washout with podiatry. Copious amounts of purulent drainage noted with tracking no to dorsal foot at 1st interdigital space. Surgical cultures are growing GBS. He is currently on IV Cefazolin. Overall is doing well but still having post operative fevers. Post op x-ray showed retained foreign body. He is scheduled to undergo repeat washout today.      Plan  - Continue Cefazolin 2 g IV q 8 hours. Tolerating well without any adverse reactions.   - Repeat washout today for removal of retained foreign body. If purulence is seen in the OR, please obtain new surgical cultures.    - As his foot has been washed out and there is no evidence of osteomyelitis, can likely discharge on oral antibiotics if continues to clinically improve.  - Recommend Allergy follow up as an outpatient for PCN skin testing   - Recommend leg elevation.   - ID will follow.             Please call for any questions. Thank you.  Cielo Almazan PA-C  Phone: [phone]  Pager: [phone]    Subjective:     Principal Problem:Diabetic ulcer of right midfoot    HPI: Mr. Rivera is a 52-year-old male admitted with right foot infection. Patient states that he stepped on a piece of glass at work that penetrated through his shoe on May 19.  At that time he removed the  glass, used antiseptic and antibacterial on his foot and felt that things were ok. He began having issues on May 26, when he noted that a large blister developed on his plantar surface near the first digit. No fevers, chills or sweats at home. On the 29th he was seen by his PCP and started on Cipro and Clindamycin. Patient notes that the clinda and Cipro gave him diarrhea, severe nausea and vomiting. Unfortunately his symptoms did not improve on antibiotics alone prompting him to come to the ED.     In the ED patient was noted to have a WBC of 15K, , , procalc 0.29. MRI showed diffuse subcutaneous and myofascial forefoot edema, fluid collection along the plantar aspect of the 1st and 2nd metatarsophalangeal joints with associated radiopaque foreign body likely reflecting glass.  No osteomyelitis. He was started on Dapto, Flagyl and Aztreonam given documented PCN allergy. He was taken to the OR yesterday for washout with podiatry. Copious amounts of purulent drainage noted from ulceration with tracking noted to dorsal foot at 1st interdigital space. Surgical cultures are growing GBS. He had an isolated fever post op. WBC is trending down. He is seen at bedside and feels well today. Pain controlled. Patient denies current fevers, chills, sweats, dysuria, rash, shortness of breath, chest pain, abdominal pain. Diarrhea and nausea resolved. Of note, discussed his PCN allergy and patient states that his reaction was as a child and he does not remember the episode. His mother told him he went into a coma after receiving IM PCN. He denies being told of any anaphylactic like reactions. He is unaware if he has ever been treated with a cephalosporin.  Interval History:   NAEON. Still having fevers up to 101.8 overnight. Denies night sweats or chills. Tolerating Cefazolin well. Foreign body retained on post op x-ray. To OR today for repeat washout.        Review of Systems   Constitutional: Negative for chills,  diaphoresis, fatigue and fever.   Respiratory: Negative for cough and shortness of breath.    Cardiovascular: Positive for leg swelling. Negative for chest pain.   Gastrointestinal: Negative for abdominal pain, constipation, diarrhea, nausea and vomiting.   Genitourinary: Negative for difficulty urinating, dysuria, frequency and hematuria.   Musculoskeletal: Positive for arthralgias (right foot) and joint swelling. Negative for back pain.   Skin: Positive for color change and wound. Negative for rash.   Neurological: Negative for weakness and headaches.   Hematological: Negative for adenopathy.   Psychiatric/Behavioral: Negative for confusion. The patient is not nervous/anxious.    All other systems reviewed and are negative.    Objective:     Vital Signs (Most Recent):  Temp: 98.1 °F (36.7 °C) (06/05/18 1100)  Pulse: (!) 58 (06/05/18 1100)  Resp: 16 (06/05/18 1100)  BP: 119/71 (06/05/18 1100)  SpO2: 96 % (06/05/18 1100) Vital Signs (24h Range):  Temp:  [98.1 °F (36.7 °C)-101.8 °F (38.8 °C)] 98.1 °F (36.7 °C)  Pulse:  [58-90] 58  Resp:  [16-20] 16  SpO2:  [90 %-96 %] 96 %  BP: (112-141)/(55-80) 119/71     Weight: 122.5 kg (270 lb)  Body mass index is 35.62 kg/m².    Estimated Creatinine Clearance: 146.4 mL/min (based on SCr of 0.8 mg/dL).    Physical Exam   Constitutional: He is oriented to person, place, and time. He appears well-developed and well-nourished. No distress.   HENT:   Head: Normocephalic and atraumatic.   Eyes: Conjunctivae are normal. No scleral icterus.   Cardiovascular: Normal rate, regular rhythm and intact distal pulses.    No murmur heard.  Pulmonary/Chest: Effort normal. No respiratory distress. He has no wheezes.   Abdominal: Soft. He exhibits no distension. There is no tenderness. There is no guarding.   Musculoskeletal: He exhibits edema and tenderness (right foot).   Neurological: He is alert and oriented to person, place, and time.   Skin: Skin is warm and dry. He is not diaphoretic. There  is erythema.   Right foot dressed. Dressing c/d/i. Cellulitis to RLE but improved. TTP.    Psychiatric: He has a normal mood and affect. His behavior is normal. Thought content normal.       Significant Labs:   Blood Culture:     Recent Labs  Lab 06/01/18  1445 06/02/18  2111 06/02/18  2115   LABBLOO No Growth to date  No Growth to date  No Growth to date  No Growth to date No Growth to date  No Growth to date  No Growth to date No Growth to date  No Growth to date  No Growth to date     CBC:     Recent Labs  Lab 06/04/18  0508 06/05/18  0404   WBC 13.18* 12.35   HGB 12.2* 11.8*   HCT 35.7* 34.9*    272     CMP:     Recent Labs  Lab 06/04/18  0508 06/05/18  0404   * 133*   K 4.1 3.9   CL 96 97   CO2 25 27   * 171*   BUN 16 11   CREATININE 0.9 0.8   CALCIUM 8.2* 8.2*   PROT 6.4 6.1   ALBUMIN 2.2* 2.0*   BILITOT 0.5 0.5   ALKPHOS 90 89   AST 21 25   ALT 18 21   ANIONGAP 9 9   EGFRNONAA >60.0 >60.0     Wound Culture:     Recent Labs  Lab 06/02/18  1218 06/02/18  1226   LABAERO STREPTOCOCCUS AGALACTIAE (GROUP B)ManyBeta-hemolytic streptococci are routinely susceptible to penicillins,cephalosporins and carbapenems.Susceptibility testing not routinely performed STREPTOCOCCUS AGALACTIAE (GROUP B)ManyBeta-hemolytic streptococci are routinely susceptible to penicillins,cephalosporins and carbapenems.Susceptibility testing not routinely performed     All pertinent labs within the past 24 hours have been reviewed.    Significant Imaging: I have reviewed all pertinent imaging results/findings within the past 24 hours.

## 2018-06-05 NOTE — ASSESSMENT & PLAN NOTE
- Uncontrolled. Hgb 12 on admission (previously 9.7 in 2/2018). On insulin glargine 55 units, aspart 20 units TIDWM, metformin, trulicity at home.  - 's, anion gap 14 on admission.  - BG 140s-180s on 80% home insulin regimen.  - Continue insulin detemir 45 units daily.  - Continue insulin aspart 15 units TIDWM.  - POCT glucose AC & HS.  - Holding home metformin and trulicity while in house.

## 2018-06-05 NOTE — PLAN OF CARE
SAFETY MAINTAINED. NO FALLS. PAIN MANAGED. SURGICAL PROCEDURE PERFORMED. EDUCATION PROVIDED. VITAL SIGNS AND CBG WITHIN NORMAL LIMITS. SPECIAL PRECAUTION ISOLATION INITIATED.

## 2018-06-05 NOTE — SUBJECTIVE & OBJECTIVE
Interval History:   NAEON. Still having fevers up to 101.8 overnight. Denies night sweats or chills. Tolerating Cefazolin well. Foreign body retained on post op x-ray. To OR today for repeat washout.        Review of Systems   Constitutional: Negative for chills, diaphoresis, fatigue and fever.   Respiratory: Negative for cough and shortness of breath.    Cardiovascular: Positive for leg swelling. Negative for chest pain.   Gastrointestinal: Negative for abdominal pain, constipation, diarrhea, nausea and vomiting.   Genitourinary: Negative for difficulty urinating, dysuria, frequency and hematuria.   Musculoskeletal: Positive for arthralgias (right foot) and joint swelling. Negative for back pain.   Skin: Positive for color change and wound. Negative for rash.   Neurological: Negative for weakness and headaches.   Hematological: Negative for adenopathy.   Psychiatric/Behavioral: Negative for confusion. The patient is not nervous/anxious.    All other systems reviewed and are negative.    Objective:     Vital Signs (Most Recent):  Temp: 98.1 °F (36.7 °C) (06/05/18 1100)  Pulse: (!) 58 (06/05/18 1100)  Resp: 16 (06/05/18 1100)  BP: 119/71 (06/05/18 1100)  SpO2: 96 % (06/05/18 1100) Vital Signs (24h Range):  Temp:  [98.1 °F (36.7 °C)-101.8 °F (38.8 °C)] 98.1 °F (36.7 °C)  Pulse:  [58-90] 58  Resp:  [16-20] 16  SpO2:  [90 %-96 %] 96 %  BP: (112-141)/(55-80) 119/71     Weight: 122.5 kg (270 lb)  Body mass index is 35.62 kg/m².    Estimated Creatinine Clearance: 146.4 mL/min (based on SCr of 0.8 mg/dL).    Physical Exam   Constitutional: He is oriented to person, place, and time. He appears well-developed and well-nourished. No distress.   HENT:   Head: Normocephalic and atraumatic.   Eyes: Conjunctivae are normal. No scleral icterus.   Cardiovascular: Normal rate, regular rhythm and intact distal pulses.    No murmur heard.  Pulmonary/Chest: Effort normal. No respiratory distress. He has no wheezes.   Abdominal: Soft. He  exhibits no distension. There is no tenderness. There is no guarding.   Musculoskeletal: He exhibits edema and tenderness (right foot).   Neurological: He is alert and oriented to person, place, and time.   Skin: Skin is warm and dry. He is not diaphoretic. There is erythema.   Right foot dressed. Dressing c/d/i. Cellulitis to RLE but improved. TTP.    Psychiatric: He has a normal mood and affect. His behavior is normal. Thought content normal.       Significant Labs:   Blood Culture:     Recent Labs  Lab 06/01/18  1445 06/02/18  2111 06/02/18  2115   LABBLOO No Growth to date  No Growth to date  No Growth to date  No Growth to date No Growth to date  No Growth to date  No Growth to date No Growth to date  No Growth to date  No Growth to date     CBC:     Recent Labs  Lab 06/04/18  0508 06/05/18  0404   WBC 13.18* 12.35   HGB 12.2* 11.8*   HCT 35.7* 34.9*    272     CMP:     Recent Labs  Lab 06/04/18  0508 06/05/18  0404   * 133*   K 4.1 3.9   CL 96 97   CO2 25 27   * 171*   BUN 16 11   CREATININE 0.9 0.8   CALCIUM 8.2* 8.2*   PROT 6.4 6.1   ALBUMIN 2.2* 2.0*   BILITOT 0.5 0.5   ALKPHOS 90 89   AST 21 25   ALT 18 21   ANIONGAP 9 9   EGFRNONAA >60.0 >60.0     Wound Culture:     Recent Labs  Lab 06/02/18  1218 06/02/18  1226   LABAERO STREPTOCOCCUS AGALACTIAE (GROUP B)ManyBeta-hemolytic streptococci are routinely susceptible to penicillins,cephalosporins and carbapenems.Susceptibility testing not routinely performed STREPTOCOCCUS AGALACTIAE (GROUP B)ManyBeta-hemolytic streptococci are routinely susceptible to penicillins,cephalosporins and carbapenems.Susceptibility testing not routinely performed     All pertinent labs within the past 24 hours have been reviewed.    Significant Imaging: I have reviewed all pertinent imaging results/findings within the past 24 hours.

## 2018-06-05 NOTE — PROGRESS NOTES
[facility] Hospital Medicine  Progress Note    Patient Name: Billy Sheffield Miguel  MRN: [id number]  Patient Class: IP- Inpatient   Admission Date: 6/1/2018  Length of Stay: 3 days  Attending Physician: Josiah Sawyer, *  Primary Care Provider: BRAD Baker MD    [facility] Medicine Team: [facility] MED 2 Millicent Torre MD    Subjective:     Principal Problem:Diabetic ulcer of right midfoot    HPI:  Patient is a 52-year-old male with HTN, MI 2013 s/p 1 stent, DMT2, colon cancer s/p resection 2017 who presents after failing outpatient treatment for a right ulcer.  Patient states that he stepped on a piece of glass at work where he is  on the 19th of May and it went through his shoe. At that time he removed the glass, used antiseptic and antibacterial on his foot and felt that things are going OK. He didn't have any issues until last Saturday The 26th, when he noted that a puffy cherry blister was occurring on his plantar surface near the first digit. On Tuesday, the 29th he presented to his primary care physician in [place] where he was started on clinda and cipro. Patient notes that the clinda and Cipro gave him diarrhea and vomiting. He stated that he had diarrhea three times a day with some form, mostly liquid, no blood and it has been the same since it started. His vomiting occurred five times yesterday, and was brownish with food. His last diarrhea episode was at 10:30 this morning, and vomiting at 4:30 in the afternoon. The patient presented to his outside PCP today, without relief of symptoms on antibiotics. Patient denies fever, chills, nausea, vomiting, diarrhea, dysuria, rash, shortness of breath, chest pain, abdominal pain.  Patient states that his diabetes started 15 to 20 years ago, and has been reasonably well controlled since then with no neuropathy, optic issues or kidney issues. Patient notes that he had a stent in 2013 for coronary artery disease, and colon cancer  that was resected in April 2017 without any issues on follow up.    Hospital Course:  6/1/2018- admission date. Empirically started on daptomycin due to PCN allergy, aztreonam, flagyl for anaerobe coverage. Diarrhea resolved on admission, thought to be side effect of outpatient abx rather than c diff.  6/2/2018- Received OR debridement by podiatry. Tolerated procedure well. Had fever of 101.9 overnight. WBC persistently ~13.  6/3/2018- Aerobic wound culture grew strep agalactiae. Per ID recs, abx switched to cefazolin with no adverse effects. Fever of 101.4. Repeat XR of foot showed residual piece of glass in foot. Had not had any BM since admission. Was started on bowel regimen. Persistent hyponatremia with Na+ in low 130s. Urine studies showed low-normal urine sodium and normal osmolality.    Interval History:   NAEON. No complaints this morning. Reports last BM was 4 days ago, on day of admission.    Review of Systems   Constitutional: Negative for chills, fever and unexpected weight change.   HENT: Negative for hearing loss.    Eyes: Negative for visual disturbance.   Respiratory: Negative for shortness of breath.    Cardiovascular: Negative for chest pain and leg swelling.   Gastrointestinal: Negative for abdominal pain, constipation, diarrhea, nausea and vomiting.   Genitourinary: Negative for dysuria and hematuria.   Musculoskeletal: Negative for arthralgias.   Skin: Positive for color change, rash and wound.   Neurological: Negative for seizures and weakness.   Hematological: Negative for adenopathy. Does not bruise/bleed easily.     Objective:     Vital Signs (Most Recent):  Temp: (!) 100.7 °F (38.2 °C) (06/04/18 1608)  Pulse: 81 (06/04/18 1608)  Resp: 20 (06/04/18 1608)  BP: (!) 141/80 (06/04/18 1608)  SpO2: (!) 94 % (06/04/18 1608) Vital Signs (24h Range):  Temp:  [98.7 °F (37.1 °C)-100.8 °F (38.2 °C)] 100.7 °F (38.2 °C)  Pulse:  [68-81] 81  Resp:  [16-20] 20  SpO2:  [93 %-98 %] 94 %  BP:  (108-141)/(58-80) 141/80     Weight: 122.5 kg (270 lb)  Body mass index is 35.62 kg/m².  No intake or output data in the 24 hours ending 06/04/18 1856   Physical Exam   Constitutional: He is oriented to person, place, and time. He appears well-developed and well-nourished. No distress.   HENT:   Head: Normocephalic and atraumatic.   Eyes: Conjunctivae, EOM and lids are normal.   Neck: No tracheal deviation present. No thyromegaly present.   Cardiovascular: Normal rate, regular rhythm, normal heart sounds and intact distal pulses.    No murmur heard.  Pulmonary/Chest: Effort normal and breath sounds normal. No respiratory distress. He has no wheezes. He has no rales.   Abdominal: Soft. He exhibits no distension and no mass. There is no tenderness. No hernia.   Musculoskeletal: He exhibits tenderness. He exhibits no edema.   Right foot bandage, intact, clean, dry. Point tenderness on sole of foot at location of wound.   Neurological: He is alert and oriented to person, place, and time. No cranial nerve deficit. He exhibits normal muscle tone.   Skin: Skin is warm and dry. Rash noted. He is not diaphoretic. There is erythema.   RLE erythema up to mid shin, no change from marked border. Non tender, non edematous.   Psychiatric: He has a normal mood and affect.   Vitals reviewed.    Significant Labs: All pertinent labs within the past 24 hours have been reviewed.  CMP:   6/4/2018 05:08   Sodium 130 (L)   Potassium 4.1   Chloride 96   CO2 25   Anion Gap 9   BUN, Bld 16   Creatinine 0.9   eGFR if non African American >60.0   eGFR if African American >60.0   Glucose 205 (H)   Calcium 8.2 (L)   Phosphorus 2.5 (L)   Magnesium 1.8   Alkaline Phosphatase 90   Total Protein 6.4   Albumin 2.2 (L)   Total Bilirubin 0.5   AST 21   ALT 18     CBC:   6/4/2018 05:08   WBC 13.18 (H)   RBC 4.15 (L)   Hemoglobin 12.2 (L)   Hematocrit 35.7 (L)   MCV 86   MCH 29.4   MCHC 34.2   RDW 11.9   Platelets 284   MPV 9.3   Gran% 75.9 (H)   Gran #  (ANC) 10.0 (H)   Immature Granulocytes 1.2 (H)   Immature Grans (Abs) 0.16 (H)   Lymph% 8.9 (L)   Lymph # 1.2   Mono% 12.5   Mono # 1.7 (H)   Eosinophil% 1.1   Eos # 0.1   Basophil% 0.4   Baso # 0.05   nRBC 0     Urine studies:   6/4/2018 10:00   Sodium Urine Random 26   Osmolality, Ur 302      6/4/2018 05:08   Osmolality 281     DM panel:   6/3/2018 07:20 6/3/2018 12:12 6/3/2018 17:41 6/4/2018 12:30 6/4/2018 17:11   POCT Glucose 149 (H) 185 (H) 179 (H) 214 (H) 208 (H)     Significant Imaging: I have reviewed and interpreted all pertinent imaging results/findings within the past 24 hours.   No new imaging.    Assessment/Plan:      * Diabetic ulcer of right midfoot    Cellulitis  Foreign object (glass) in foot  - S/p OR debridement by podiatry on day 2. Aerobic wound culture on 6/2/18 grew strep agalactiae. Anaerobic culture pending. No signs of osteomyelitis. Blood culture NGTD. WBC persistently 13.  - Initially on aztreonam, flagyl, daptomycin on admission. Switched to cefazolin on day 3, per ID recs.  - Repeat XR foot showed residual piece of glass in wound.  - Plan for OR removal of residual glass in wound tomorrow by podiatry.  - Continue cefazolin, dose increased to 2 g q8h today per ID recs.  - Plan to discharge home on PO cephalosporin.        Fever    - Recurrent episodes of temp 100-101 F post op. Ddx includes drug fever, given pt on cephalosporin in the setting of severe PCN allergy.  - Tylenol prn.  - Monitor.        Leukocytosis    - WBC persistently ~13. Likely due to acute inflammation vs uncontrolled cellulitis, given recurrent fever.  - Monitor.        Type 2 diabetes mellitus 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, with long-term current use of insulin    - Uncontrolled. Hgb 12 on admission (previously 9.7 in 2/2018). On insulin glargine 55 units, aspart 20 units TIDWM, metformin, trulicity at home.  - 's, anion gap 14 on admission.  - BG  140s-180s on 80% home insulin regimen.  - Continue insulin detemir 45 units daily.  - Continue insulin aspart 15 units TIDWM.  - POCT glucose AC & HS.  - Holding home metformin and trulicity while in house.        HTN (hypertension)    - On lisinopril 40 mg daily and carvedilol 12.5 mg BID at home.  - BP currently low 100s-130s.  - Continue home ACE-I.  - Continue home BB.  - Monitor.        Constipation    - Initially presented with diarrhea. No BM since admission. Likely ileus 2/2 to opiate use in the setting of acute post-op pain/trauma.  - Start senna-docusate daily.  - Start miralax daily.  - Lactulose 30 g x 1.        CAD s/p PCI of LAD (SHELBY) in May 2013    - On ASA 81 mg daily and atorvastatin 80 mg daily at home.  - Currently stable. Asymptomatic.  - Continue home ASA.  - Continue home statin.        Dyslipidemia    - On atorvastatin 80 mg daily at home.  - Continue home statin.          VTE Risk Mitigation         Ordered     IP VTE HIGH RISK PATIENT  Once      06/02/18 8890        Millicent Torre MD  Department of Hospital Medicine   [facility]

## 2018-06-05 NOTE — ASSESSMENT & PLAN NOTE
- Initially presented with diarrhea. No BM since admission. Likely ileus 2/2 to opiate use in the setting of acute post-op pain/trauma.  - Start senna-docusate daily.  - Start miralax daily.  - Lactulose 30 g x 1.

## 2018-06-06 ENCOUNTER — ANESTHESIA EVENT (OUTPATIENT)
Dept: SURGERY | Facility: HOSPITAL | Age: 53
DRG: 982 | End: 2018-06-06
Payer: COMMERCIAL

## 2018-06-06 LAB
ALBUMIN SERPL BCP-MCNC: 2 G/DL
ALP SERPL-CCNC: 115 U/L
ALT SERPL W/O P-5'-P-CCNC: 27 U/L
ANION GAP SERPL CALC-SCNC: 9 MMOL/L
AST SERPL-CCNC: 33 U/L
BACTERIA BLD CULT: NORMAL
BASOPHILS # BLD AUTO: 0.04 K/UL
BASOPHILS NFR BLD: 0.3 %
BILIRUB SERPL-MCNC: 0.4 MG/DL
BUN SERPL-MCNC: 10 MG/DL
CALCIUM SERPL-MCNC: 8 MG/DL
CHLORIDE SERPL-SCNC: 97 MMOL/L
CO2 SERPL-SCNC: 26 MMOL/L
CREAT SERPL-MCNC: 1 MG/DL
DIFFERENTIAL METHOD: ABNORMAL
EOSINOPHIL # BLD AUTO: 0.2 K/UL
EOSINOPHIL NFR BLD: 1.1 %
ERYTHROCYTE [DISTWIDTH] IN BLOOD BY AUTOMATED COUNT: 12 %
EST. GFR  (AFRICAN AMERICAN): >60 ML/MIN/1.73 M^2
EST. GFR  (NON AFRICAN AMERICAN): >60 ML/MIN/1.73 M^2
GLUCOSE SERPL-MCNC: 204 MG/DL
HCT VFR BLD AUTO: 36 %
HGB BLD-MCNC: 11.8 G/DL
IMM GRANULOCYTES # BLD AUTO: 0.22 K/UL
IMM GRANULOCYTES NFR BLD AUTO: 1.7 %
LYMPHOCYTES # BLD AUTO: 1.2 K/UL
LYMPHOCYTES NFR BLD: 9.2 %
MAGNESIUM SERPL-MCNC: 1.8 MG/DL
MCH RBC QN AUTO: 28.8 PG
MCHC RBC AUTO-ENTMCNC: 32.8 G/DL
MCV RBC AUTO: 88 FL
MONOCYTES # BLD AUTO: 1.7 K/UL
MONOCYTES NFR BLD: 12.9 %
NEUTROPHILS # BLD AUTO: 9.8 K/UL
NEUTROPHILS NFR BLD: 74.8 %
NRBC BLD-RTO: 0 /100 WBC
PHOSPHATE SERPL-MCNC: 2.8 MG/DL
PLATELET # BLD AUTO: 274 K/UL
PMV BLD AUTO: 9.8 FL
POCT GLUCOSE: 179 MG/DL (ref 70–110)
POCT GLUCOSE: 195 MG/DL (ref 70–110)
POCT GLUCOSE: 238 MG/DL (ref 70–110)
POCT GLUCOSE: 260 MG/DL (ref 70–110)
POTASSIUM SERPL-SCNC: 3.9 MMOL/L
PROT SERPL-MCNC: 6.4 G/DL
RBC # BLD AUTO: 4.1 M/UL
SODIUM SERPL-SCNC: 132 MMOL/L
WBC # BLD AUTO: 13.16 K/UL

## 2018-06-06 PROCEDURE — 99233 SBSQ HOSP IP/OBS HIGH 50: CPT | Mod: ,,, | Performed by: HOSPITALIST

## 2018-06-06 PROCEDURE — 84100 ASSAY OF PHOSPHORUS: CPT

## 2018-06-06 PROCEDURE — 25000003 PHARM REV CODE 250: Performed by: STUDENT IN AN ORGANIZED HEALTH CARE EDUCATION/TRAINING PROGRAM

## 2018-06-06 PROCEDURE — 11000001 HC ACUTE MED/SURG PRIVATE ROOM

## 2018-06-06 PROCEDURE — 63600175 PHARM REV CODE 636 W HCPCS: Performed by: STUDENT IN AN ORGANIZED HEALTH CARE EDUCATION/TRAINING PROGRAM

## 2018-06-06 PROCEDURE — 63600175 PHARM REV CODE 636 W HCPCS: Performed by: PHYSICIAN ASSISTANT

## 2018-06-06 PROCEDURE — 99233 SBSQ HOSP IP/OBS HIGH 50: CPT | Mod: ,,, | Performed by: INTERNAL MEDICINE

## 2018-06-06 PROCEDURE — 85025 COMPLETE CBC W/AUTO DIFF WBC: CPT

## 2018-06-06 PROCEDURE — 80053 COMPREHEN METABOLIC PANEL: CPT

## 2018-06-06 PROCEDURE — 83735 ASSAY OF MAGNESIUM: CPT

## 2018-06-06 PROCEDURE — 87040 BLOOD CULTURE FOR BACTERIA: CPT

## 2018-06-06 PROCEDURE — 36415 COLL VENOUS BLD VENIPUNCTURE: CPT

## 2018-06-06 RX ADMIN — LISINOPRIL 40 MG: 20 TABLET ORAL at 08:06

## 2018-06-06 RX ADMIN — OXYCODONE HYDROCHLORIDE 5 MG: 5 TABLET ORAL at 05:06

## 2018-06-06 RX ADMIN — ACETAMINOPHEN 650 MG: 325 TABLET ORAL at 04:06

## 2018-06-06 RX ADMIN — INSULIN ASPART 15 UNITS: 100 INJECTION, SOLUTION INTRAVENOUS; SUBCUTANEOUS at 08:06

## 2018-06-06 RX ADMIN — ENOXAPARIN SODIUM 40 MG: 100 INJECTION SUBCUTANEOUS at 04:06

## 2018-06-06 RX ADMIN — ATORVASTATIN CALCIUM 80 MG: 20 TABLET, FILM COATED ORAL at 08:06

## 2018-06-06 RX ADMIN — ACETAMINOPHEN 650 MG: 325 TABLET ORAL at 08:06

## 2018-06-06 RX ADMIN — CARVEDILOL 12.5 MG: 12.5 TABLET, FILM COATED ORAL at 08:06

## 2018-06-06 RX ADMIN — INSULIN DETEMIR 45 UNITS: 100 INJECTION, SOLUTION SUBCUTANEOUS at 08:06

## 2018-06-06 RX ADMIN — Medication 1 CAPSULE: at 08:06

## 2018-06-06 RX ADMIN — CEFAZOLIN 2 G: 1 INJECTION, POWDER, FOR SOLUTION INTRAMUSCULAR; INTRAVENOUS at 05:06

## 2018-06-06 RX ADMIN — INSULIN ASPART 15 UNITS: 100 INJECTION, SOLUTION INTRAVENOUS; SUBCUTANEOUS at 04:06

## 2018-06-06 RX ADMIN — CEFAZOLIN 2 G: 1 INJECTION, POWDER, FOR SOLUTION INTRAMUSCULAR; INTRAVENOUS at 02:06

## 2018-06-06 RX ADMIN — INSULIN ASPART 6 UNITS: 100 INJECTION, SOLUTION INTRAVENOUS; SUBCUTANEOUS at 04:06

## 2018-06-06 RX ADMIN — INSULIN ASPART 15 UNITS: 100 INJECTION, SOLUTION INTRAVENOUS; SUBCUTANEOUS at 12:06

## 2018-06-06 RX ADMIN — INSULIN ASPART 1 UNITS: 100 INJECTION, SOLUTION INTRAVENOUS; SUBCUTANEOUS at 09:06

## 2018-06-06 RX ADMIN — CEFAZOLIN 2 G: 1 INJECTION, POWDER, FOR SOLUTION INTRAMUSCULAR; INTRAVENOUS at 09:06

## 2018-06-06 RX ADMIN — INSULIN ASPART 4 UNITS: 100 INJECTION, SOLUTION INTRAVENOUS; SUBCUTANEOUS at 12:06

## 2018-06-06 NOTE — ASSESSMENT & PLAN NOTE
Cellulitis  Foreign object (glass) in foot  - S/p OR debridement by podiatry on day 2. Aerobic wound culture on 6/2/18 grew strep agalactiae. Anaerobic culture pending. No signs of osteomyelitis. Blood culture NGTD. WBC persistently 13. Repeat XR foot showed residual piece of glass in wound. Developed recurrent fevers with temp up to 102 F. Unsuccessful attempts to retrieve remaining piece of glass in foot in OR on day 5. Continues to have fever, consistent with lack of source control. Cellulitis of R leg improving.  - Continue cefazolin 2 g IV q8h, per ID recs (started on day 3, s/p aztreonam, flagyl, daptomycin initially).  - Plan to re-attempt surgical removal of remaining piece of glass in foot, per podiatry.  - Plan to discharge home on PO cephalosporin.

## 2018-06-06 NOTE — ASSESSMENT & PLAN NOTE
- Uncontrolled. Hgb 12 on admission (previously 9.7 in 2/2018). On insulin glargine 55 units, aspart 20 units TIDWM, metformin, trulicity at home.  - 's, anion gap 14 on admission.  - BG currently mid 100s to mid 200s on 80% home insulin regimen, at goal.  - Continue insulin detemir 45 units daily.  - Continue insulin aspart 15 units TIDWM.  - POCT glucose AC & HS.  - Holding home metformin and trulicity while in house.

## 2018-06-06 NOTE — ASSESSMENT & PLAN NOTE
- Initially presented with diarrhea, likely side effect of PO abx outpatient. No BM from day of admission to day 4, likely opiate-induced ileus. Recurrence of diarrhea on day 5, again likely side effect of abx use. Low suspicion for c diff, given mild leukocytosis, spontaneous resolution/improvement, and lack of abdominal pain.  - Continue probiotics.  - Holding bowel regimen.

## 2018-06-06 NOTE — PROGRESS NOTES
[facility]  Infectious Disease  Progress Note    Patient Name: Billy Sheffield Miguel  MRN: [id number]  Admission Date: 6/1/2018  Length of Stay: 5 days  Attending Physician: Josiah Sawyer, *  Primary Care Provider: BRAD Baker MD    Isolation Status: Special Contact  Assessment/Plan:      Right foot infection       52-year-old male admitted with right foot infection after penetrating glass trauma that failed outpatient antibiotics alone. In the ED patient was noted to have a fever, WBC of 15K, , . MRI showed diffuse subcutaneous and myofascial forefoot edema, fluid collection with associated radiopaque foreign body. No osteomyelitis. He was taken to the OR 6/2 for washout with podiatry. Copious amounts of purulent drainage noted with tracking to dorsal foot at 1st interdigital space. Surgical cultures grew GBS. He is currently on IV Cefazolin. Post op x-ray showed retained foreign body.  He underwent a second washout yesterday and again copious amounts of purulent drainage noted. Foreign body was found at 1st interdigital space but unable to remove. Unfortunately new surgical cultures were not obtained.       He is still having fevers up to 102.5 overnight. Otherwise clinically stable. Post op x-ray showed retained foreign body with increased soft tissue gas. Discussed with podiatry concerns that infection will not clear without removal. He is going for another I&D tomorrow for attempt to remove object.      Plan  - Continue Cefazolin 2 g IV q 8 hours.   - Repeat blood cultures given high fevers, although likely due to lack of source control.    - To OR tomorrow for removal of retained foreign body. Have discussed case with Podiatry. Team to obtain new surgical cultures during surgery .   - Will follow newly obtained surgical cultures to guide antibiotic therapy.   - If source control is achieved and there is no evidence of osteomyelitis, can likely discharge on oral  antibiotics.  - Recommend Allergy follow up as an outpatient for PCN skin testing   - ID will follow.           Please call for any questions. Thank you.  Cielo Almazan PA-C  Phone: [phone]  Pager: [phone]    Subjective:     Principal Problem:Diabetic ulcer of right midfoot    HPI: Mr. Rivera is a 52-year-old male admitted with right foot infection. Patient states that he stepped on a piece of glass at work that penetrated through his shoe on May 19.  At that time he removed the glass, used antiseptic and antibacterial on his foot and felt that things were ok. He began having issues on May 26, when he noted that a large blister developed on his plantar surface near the first digit. No fevers, chills or sweats at home. On the 29th he was seen by his PCP and started on Cipro and Clindamycin. Patient notes that the clinda and Cipro gave him diarrhea, severe nausea and vomiting. Unfortunately his symptoms did not improve on antibiotics alone prompting him to come to the ED.     In the ED patient was noted to have a WBC of 15K, , , procalc 0.29. MRI showed diffuse subcutaneous and myofascial forefoot edema, fluid collection along the plantar aspect of the 1st and 2nd metatarsophalangeal joints with associated radiopaque foreign body likely reflecting glass.  No osteomyelitis. He was started on Dapto, Flagyl and Aztreonam given documented PCN allergy. He was taken to the OR yesterday for washout with podiatry. Copious amounts of purulent drainage noted from ulceration with tracking noted to dorsal foot at 1st interdigital space. Surgical cultures are growing GBS. He had an isolated fever post op. WBC is trending down. He is seen at bedside and feels well today. Pain controlled. Patient denies current fevers, chills, sweats, dysuria, rash, shortness of breath, chest pain, abdominal pain. Diarrhea and nausea resolved. Of note, discussed his PCN allergy and patient states that his reaction was as a child and  he does not remember the episode. His mother told him he went into a coma after receiving IM PCN. He denies being told of any anaphylactic like reactions. He is unaware if he has ever been treated with a cephalosporin.  Interval History:   NAEON. Went to OR yesterday and copious amounts of purulent drainage noted in OR. Foreign body noted at 1st interdigital space but team was unable to remove. Unfortunately new surgical cultures were not obtained.     He is still having fevers up to 102.5. Denies night sweats or chills. Foot pain well controlled. Post op x-ray showed retained foreign body with increased soft tissue gas. Discussed concerns that infection will not clear without removal with podiatry and the patient.      Review of Systems   Constitutional: Negative for chills, diaphoresis and fever.   Respiratory: Negative for cough and shortness of breath.    Cardiovascular: Positive for leg swelling. Negative for chest pain.   Gastrointestinal: Negative for abdominal pain, diarrhea, nausea and vomiting.   Genitourinary: Negative for difficulty urinating, dysuria, frequency and hematuria.   Musculoskeletal: Positive for arthralgias (right foot) and joint swelling. Negative for back pain.   Skin: Positive for color change (redness RLE) and wound. Negative for rash.   Neurological: Negative for weakness and headaches.   Hematological: Negative for adenopathy.   Psychiatric/Behavioral: Negative for confusion. The patient is not nervous/anxious.    All other systems reviewed and are negative.    Objective:     Vital Signs (Most Recent):  Temp: 99.3 °F (37.4 °C) (06/06/18 0834)  Pulse: 65 (06/06/18 0834)  Resp: 16 (06/06/18 0834)  BP: 135/76 (06/06/18 0834)  SpO2: 98 % (06/06/18 0834) Vital Signs (24h Range):  Temp:  [97.9 °F (36.6 °C)-102.5 °F (39.2 °C)] 99.3 °F (37.4 °C)  Pulse:  [55-89] 65  Resp:  [13-20] 16  SpO2:  [91 %-100 %] 98 %  BP: (102-135)/(56-76) 135/76     Weight: 122.5 kg (270 lb)  Body mass index is 35.62  kg/m².    Estimated Creatinine Clearance: 117.1 mL/min (based on SCr of 1 mg/dL).    Physical Exam   Constitutional: He is oriented to person, place, and time. He appears well-developed and well-nourished. No distress.   HENT:   Head: Normocephalic and atraumatic.   Eyes: Conjunctivae are normal. No scleral icterus.   Cardiovascular: Normal rate, regular rhythm and intact distal pulses.    No murmur heard.  Pulmonary/Chest: Effort normal. No respiratory distress. He has no wheezes.   Abdominal: Soft. He exhibits no distension. There is no tenderness. There is no guarding.   Musculoskeletal: He exhibits edema and tenderness (right foot).   Neurological: He is alert and oriented to person, place, and time.   Skin: Skin is warm and dry. He is not diaphoretic. There is erythema.   Right foot dressed. Dressing c/d/i. Digits appear dusky and edematous. Cellulitis to RLE improved. TTP.    Psychiatric: He has a normal mood and affect. His behavior is normal. Thought content normal.       Significant Labs:   Blood Culture:     Recent Labs  Lab 06/01/18  1445 06/02/18  2111 06/02/18  2115   LABBLOO No Growth to date  No Growth to date  No Growth to date  No Growth to date  No Growth to date No Growth to date  No Growth to date  No Growth to date  No Growth to date No Growth to date  No Growth to date  No Growth to date  No Growth to date     CBC:     Recent Labs  Lab 06/05/18  0404 06/06/18  0624   WBC 12.35 13.16*   HGB 11.8* 11.8*   HCT 34.9* 36.0*    274     CMP:     Recent Labs  Lab 06/05/18  0404 06/06/18  0624   * 132*   K 3.9 3.9   CL 97 97   CO2 27 26   * 204*   BUN 11 10   CREATININE 0.8 1.0   CALCIUM 8.2* 8.0*   PROT 6.1 6.4   ALBUMIN 2.0* 2.0*   BILITOT 0.5 0.4   ALKPHOS 89 115   AST 25 33   ALT 21 27   ANIONGAP 9 9   EGFRNONAA >60.0 >60.0     Wound Culture:     Recent Labs  Lab 06/02/18  1218 06/02/18  1226   LABAERO STREPTOCOCCUS AGALACTIAE (GROUP B)ManyBeta-hemolytic streptococci  are routinely susceptible to penicillins,cephalosporins and carbapenems.Susceptibility testing not routinely performed STREPTOCOCCUS AGALACTIAE (GROUP B)ManyBeta-hemolytic streptococci are routinely susceptible to penicillins,cephalosporins and carbapenems.Susceptibility testing not routinely performed     All pertinent labs within the past 24 hours have been reviewed.    Significant Imaging: I have reviewed all pertinent imaging results/findings within the past 24 hours.

## 2018-06-06 NOTE — ASSESSMENT & PLAN NOTE
52-year-old male admitted with right foot infection after penetrating glass trauma that failed outpatient antibiotics alone. In the ED patient was noted to have a fever, WBC of 15K, , . MRI showed diffuse subcutaneous and myofascial forefoot edema, fluid collection with associated radiopaque foreign body. No osteomyelitis. He was taken to the OR 6/2 for washout with podiatry. Copious amounts of purulent drainage noted with tracking to dorsal foot at 1st interdigital space. Surgical cultures grew GBS. He is currently on IV Cefazolin. Post op x-ray showed retained foreign body.  He underwent a second washout yesterday and again copious amounts of purulent drainage noted. Foreign body was found at 1st interdigital space but unable to remove. Unfortunately new surgical cultures were not obtained.       He is still having fevers up to 102.5 overnight. Otherwise clinically stable. Post op x-ray showed retained foreign body with increased soft tissue gas. Discussed with podiatry concerns that infection will not clear without removal. He is going for another I&D tomorrow for attempt to remove object.      Plan  - Continue Cefazolin 2 g IV q 8 hours.   - Repeat blood cultures given high fevers, although likely due to lack of source control.    - To OR tomorrow for removal of retained foreign body. Have discussed case with Podiatry. Team to obtain new surgical cultures during surgery .   - Will follow newly obtained surgical cultures to guide antibiotic therapy.   - If source control is achieved and there is no evidence of osteomyelitis, can likely discharge on oral antibiotics.  - Recommend Allergy follow up as an outpatient for PCN skin testing   - ID will follow.

## 2018-06-06 NOTE — PROGRESS NOTES
[facility] Hospital Medicine  Progress Note    Patient Name: Billy Sheffield Miguel  MRN: [id number]  Patient Class: IP- Inpatient   Admission Date: 6/1/2018  Length of Stay: 5 days  Attending Physician: Josiah Sawyer, *  Primary Care Provider: BRAD Baker MD    [facility] Medicine Team: [facility]OSP MED 2 Millicent Torre MD    Subjective:     Principal Problem:Diabetic ulcer of right midfoot    HPI:  Patient is a 52-year-old male with HTN, MI 2013 s/p 1 stent, DMT2, colon cancer s/p resection 2017 who presents after failing outpatient treatment for a right ulcer.  Patient states that he stepped on a piece of glass at work where he is  on the 19th of May and it went through his shoe. At that time he removed the glass, used antiseptic and antibacterial on his foot and felt that things are going OK. He didn't have any issues until last Saturday The 26th, when he noted that a puffy cherry blister was occurring on his plantar surface near the first digit. On Tuesday, the 29th he presented to his primary care physician in [place] where he was started on clinda and cipro. Patient notes that the clinda and Cipro gave him diarrhea and vomiting. He stated that he had diarrhea three times a day with some form, mostly liquid, no blood and it has been the same since it started. His vomiting occurred five times yesterday, and was brownish with food. His last diarrhea episode was at 10:30 this morning, and vomiting at 4:30 in the afternoon. The patient presented to his outside PCP today, without relief of symptoms on antibiotics. Patient denies fever, chills, nausea, vomiting, diarrhea, dysuria, rash, shortness of breath, chest pain, abdominal pain.  Patient states that his diabetes started 15 to 20 years ago, and has been reasonably well controlled since then with no neuropathy, optic issues or kidney issues. Patient notes that he had a stent in 2013 for coronary artery disease, and colon cancer  that was resected in April 2017 without any issues on follow up.    Hospital Course:  6/1/2018- admission date. Empirically started on daptomycin due to PCN allergy, aztreonam, flagyl for anaerobe coverage. Diarrhea resolved on admission, thought to be side effect of outpatient abx rather than c diff.  6/2/2018- Received OR debridement by podiatry. Tolerated procedure well. Had fever of 101.9 overnight. WBC persistently ~13. VASILIY 1.14 in LLE. RLE VASILIY unable to be obtained due to wound/infection.  6/3/2018- Aerobic wound culture grew strep agalactiae. Per ID recs, abx switched to cefazolin with no adverse effects. Fever of 101.4. Repeat XR of foot showed residual piece of glass in foot.  06/04/2018: Had not had any BM since admission. Was started on bowel regimen. However, pt start having diarrhea again, 4 episodes during the day, before receiving any bowel regimen. Lactulose and miralax were not given. Pt received 1 dose of senna-docusate overnight. Had 1 more episode of diarrhea overnight. Persistent hyponatremia with Na+ in low 130s. Urine studies showed low-normal urine sodium and normal osmolality. Fever of 101 recurred for 8 hours, resolved with tylenol. Pt denied symptoms during febrile episode. WBC slightly improved, left shift persisted. Thought to be due to residual foreign object embedded in foot.  06/05/2018: Recurrence of diarrhea thought to be secondary to antibiotics, given similar presentation while pt was previously on PO abx prior to admission. Started on probiotics. Stool became formed later in the day. Went to OR again for removal of remaining piece of glass in foot. Found to have purulent drainage from wound, which was debrided. However, glass was found to be too deeply embedded in tissue and was unable to be retrieved despite multiple attempts. Post op XR of foot showed soft tissue swelling and gas. Immediately post op, pt's fever recurred with temp up to 102 for 8 hours overnight.  6/6/2018:  reports fever and fatigue. Denies chills, myalgia, cough, sore throat, dysuria, WBC stable at 13. Denies foot pain or bleeding. Hgb stable. Right second toe noted to be blue/pale on exam. Thought to be bruising from pressure applied on toe in OR during debridement of foot vs ischemia (given unknown RLE VASILIY). Monitored for swelling, per podiatry recs.    Interval History:  Febrile to 102F overnight. Pt reports feeling feverish and fatigued. Denies foot pain, cough, sore throat, dysuria. Diarrhea resolved yesterday afternoon. No BM since surgery.    Review of Systems   Constitutional: Negative for chills, fever and unexpected weight change.   HENT: Negative for hearing loss.    Eyes: Negative for visual disturbance.   Respiratory: Negative for shortness of breath.    Cardiovascular: Negative for chest pain and leg swelling.   Gastrointestinal: Negative for abdominal pain, constipation, diarrhea, nausea and vomiting.   Genitourinary: Negative for dysuria and hematuria.   Musculoskeletal: Negative for arthralgias.   Skin: Positive for color change, rash and wound.   Neurological: Negative for seizures and weakness.   Hematological: Negative for adenopathy. Does not bruise/bleed easily.     Objective:     Vital Signs (Most Recent):  Temp: 98.5 °F (36.9 °C) (06/06/18 1108)  Pulse: 61 (06/06/18 1108)  Resp: 18 (06/06/18 1108)  BP: 109/64 (06/06/18 1108)  SpO2: (!) 92 % (06/06/18 1108) Vital Signs (24h Range):  Temp:  [97.9 °F (36.6 °C)-102.5 °F (39.2 °C)] 98.5 °F (36.9 °C)  Pulse:  [55-89] 61  Resp:  [13-20] 18  SpO2:  [91 %-100 %] 92 %  BP: (102-135)/(56-76) 109/64     Weight: 122.5 kg (270 lb)  Body mass index is 35.62 kg/m².    Intake/Output Summary (Last 24 hours) at 06/06/18 1147  Last data filed at 06/06/18 0600   Gross per 24 hour   Intake             1520 ml   Output             1250 ml   Net              270 ml      Physical Exam   Constitutional: He is oriented to person, place, and time. He appears  well-developed and well-nourished. No distress.   HENT:   Head: Normocephalic and atraumatic.   Eyes: Conjunctivae, EOM and lids are normal.   Neck: No tracheal deviation present. No thyromegaly present.   Cardiovascular: Normal rate, regular rhythm, normal heart sounds and intact distal pulses.    No murmur heard.  Pulmonary/Chest: Effort normal and breath sounds normal. No respiratory distress. He has no wheezes. He has no rales.   Abdominal: Soft. He exhibits no distension and no mass. There is no tenderness. No hernia.   Musculoskeletal: He exhibits tenderness. He exhibits no edema.   Right foot bandage, intact, clean, dry. Point tenderness on sole of foot at location of wound.   Neurological: He is alert and oriented to person, place, and time. No cranial nerve deficit. He exhibits normal muscle tone.   Skin: Skin is warm and dry. Rash noted. He is not diaphoretic. There is erythema.   RLE erythema up to mid shin, no change from marked border. Non tender, non edematous.   Psychiatric: He has a normal mood and affect.   Vitals reviewed.    Significant Labs: All pertinent labs within the past 24 hours have been reviewed.  CBC:   6/6/2018 06:24   WBC 13.16 (H)   RBC 4.10 (L)   Hemoglobin 11.8 (L)   Hematocrit 36.0 (L)   MCV 88   MCH 28.8   MCHC 32.8   RDW 12.0   Platelets 274   MPV 9.8   Gran% 74.8 (H)   Gran # (ANC) 9.8 (H)   Immature Granulocytes 1.7 (H)   Immature Grans (Abs) 0.22 (H)   Lymph% 9.2 (L)   Lymph # 1.2   Mono% 12.9   Mono # 1.7 (H)   Eosinophil% 1.1   Eos # 0.2   Basophil% 0.3   Baso # 0.04   nRBC 0     CMP:   6/6/2018 06:24   Sodium 132 (L)   Potassium 3.9   Chloride 97   CO2 26   Anion Gap 9   BUN, Bld 10   Creatinine 1.0   eGFR if non African American >60.0   eGFR if African American >60.0   Glucose 204 (H)   Calcium 8.0 (L)   Phosphorus 2.8   Magnesium 1.8   Alkaline Phosphatase 115   Total Protein 6.4   Albumin 2.0 (L)   Total Bilirubin 0.4   AST 33   ALT 27     DM panel:   6/5/2018 08:14  6/5/2018 10:59 6/5/2018 14:01 6/5/2018 16:56 6/5/2018 22:40 6/6/2018 08:33   POCT Glucose 133 (H) 138 (H) 147 (H) 187 (H) 257 (H) 179 (H)     Significant Imaging: I have reviewed and interpreted all pertinent imaging results/findings within the past 24 hours.   XR foot right 6/5/18:  Soft tissue swelling and some gas in the soft tissues about the metatarsal and proximal phalangeal level.  This has increased compared to prior.  History states status post I and D though radiographically a gas-forming infection not excluded.    Assessment/Plan:      * Diabetic ulcer of right midfoot    Cellulitis  Foreign object (glass) in foot  - S/p OR debridement by podiatry on day 2. Aerobic wound culture on 6/2/18 grew strep agalactiae. Anaerobic culture pending. No signs of osteomyelitis. Blood culture NGTD. WBC persistently 13. Repeat XR foot showed residual piece of glass in wound. Developed recurrent fevers with temp up to 102 F. Unsuccessful attempts to retrieve remaining piece of glass in foot in OR on day 5. Continues to have fever, consistent with lack of source control. Cellulitis of R leg improving.  - Continue cefazolin 2 g IV q8h, per ID recs (started on day 3, s/p aztreonam, flagyl, daptomycin initially).  - Plan to re-attempt surgical removal of remaining piece of glass in foot, per podiatry.  - Plan to discharge home on PO cephalosporin.        Fever    - Developed recurrent episodes of temp 100-101 F since initial OR debridement. Likely due to lack of source control, given foreign object remains embedded in wound. Low suspicion for urinary or pulmonary source of infection, given lack of symptoms/signs of systemic involvement. No signs/sx of allergic reaction to cephalosporin.  - Continue PO tylenol prn.  - IV tylenol prn for sustained fever.  - Blood cultures 6/5/18 pending.  - Monitor.        Leukocytosis    - WBC persistently ~13. Likely due to persistent wound infection, given foreign object remains embedded in  foot. Ddx includes R leg cellulitis.  - Monitor.        Type 2 diabetes mellitus 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, with long-term current use of insulin    - Uncontrolled. Hgb 12 on admission (previously 9.7 in 2/2018). On insulin glargine 55 units, aspart 20 units TIDWM, metformin, trulicity at home.  - 's, anion gap 14 on admission.  - BG currently mid 100s to mid 200s on 80% home insulin regimen, at goal.  - Continue insulin detemir 45 units daily.  - Continue insulin aspart 15 units TIDWM.  - POCT glucose AC & HS.  - Holding home metformin and trulicity while in house.        HTN (hypertension)    - On lisinopril 40 mg daily and carvedilol 12.5 mg BID at home.  - BP currently low 100s-130s/50s-70s, at goal.  - Continue home ACE-I.  - Continue home BB.  - Monitor.        Diarrhea    - Initially presented with diarrhea, likely side effect of PO abx outpatient. No BM from day of admission to day 4, likely opiate-induced ileus. Recurrence of diarrhea on day 5, again likely side effect of abx use. Low suspicion for c diff, given mild leukocytosis, spontaneous resolution/improvement, and lack of abdominal pain.  - Continue probiotics.  - Holding bowel regimen.        CAD s/p PCI of LAD (SHELBY) in May 2013    - On ASA 81 mg daily and atorvastatin 80 mg daily at home.  - Currently stable. Asymptomatic.  - Continue home ASA.  - Continue home statin.        Dyslipidemia    - On atorvastatin 80 mg daily at home.  - Continue home statin.          VTE Risk Mitigation         Ordered     enoxaparin injection 40 mg  Daily      06/05/18 1706     IP VTE HIGH RISK PATIENT  Once      06/02/18 7412        Millicent Torre MD  Department of Hospital Medicine   [facility]

## 2018-06-06 NOTE — SUBJECTIVE & OBJECTIVE
Interval History:  Febrile to 102F overnight. Pt reports feeling feverish and fatigued. Denies foot pain, cough, sore throat, dysuria. Diarrhea resolved yesterday afternoon. No BM since surgery.    Review of Systems   Constitutional: Negative for chills, fever and unexpected weight change.   HENT: Negative for hearing loss.    Eyes: Negative for visual disturbance.   Respiratory: Negative for shortness of breath.    Cardiovascular: Negative for chest pain and leg swelling.   Gastrointestinal: Negative for abdominal pain, constipation, diarrhea, nausea and vomiting.   Genitourinary: Negative for dysuria and hematuria.   Musculoskeletal: Negative for arthralgias.   Skin: Positive for color change, rash and wound.   Neurological: Negative for seizures and weakness.   Hematological: Negative for adenopathy. Does not bruise/bleed easily.     Objective:     Vital Signs (Most Recent):  Temp: 98.5 °F (36.9 °C) (06/06/18 1108)  Pulse: 61 (06/06/18 1108)  Resp: 18 (06/06/18 1108)  BP: 109/64 (06/06/18 1108)  SpO2: (!) 92 % (06/06/18 1108) Vital Signs (24h Range):  Temp:  [97.9 °F (36.6 °C)-102.5 °F (39.2 °C)] 98.5 °F (36.9 °C)  Pulse:  [55-89] 61  Resp:  [13-20] 18  SpO2:  [91 %-100 %] 92 %  BP: (102-135)/(56-76) 109/64     Weight: 122.5 kg (270 lb)  Body mass index is 35.62 kg/m².    Intake/Output Summary (Last 24 hours) at 06/06/18 1147  Last data filed at 06/06/18 0600   Gross per 24 hour   Intake             1520 ml   Output             1250 ml   Net              270 ml      Physical Exam   Constitutional: He is oriented to person, place, and time. He appears well-developed and well-nourished. No distress.   HENT:   Head: Normocephalic and atraumatic.   Eyes: Conjunctivae, EOM and lids are normal.   Neck: No tracheal deviation present. No thyromegaly present.   Cardiovascular: Normal rate, regular rhythm, normal heart sounds and intact distal pulses.    No murmur heard.  Pulmonary/Chest: Effort normal and breath sounds  normal. No respiratory distress. He has no wheezes. He has no rales.   Abdominal: Soft. He exhibits no distension and no mass. There is no tenderness. No hernia.   Musculoskeletal: He exhibits tenderness. He exhibits no edema.   Right foot bandage, intact, clean, dry. Point tenderness on sole of foot at location of wound.   Neurological: He is alert and oriented to person, place, and time. No cranial nerve deficit. He exhibits normal muscle tone.   Skin: Skin is warm and dry. Rash noted. He is not diaphoretic. There is erythema.   RLE erythema up to mid shin, no change from marked border. Non tender, non edematous.   Psychiatric: He has a normal mood and affect.   Vitals reviewed.    Significant Labs: All pertinent labs within the past 24 hours have been reviewed.  CBC:   6/6/2018 06:24   WBC 13.16 (H)   RBC 4.10 (L)   Hemoglobin 11.8 (L)   Hematocrit 36.0 (L)   MCV 88   MCH 28.8   MCHC 32.8   RDW 12.0   Platelets 274   MPV 9.8   Gran% 74.8 (H)   Gran # (ANC) 9.8 (H)   Immature Granulocytes 1.7 (H)   Immature Grans (Abs) 0.22 (H)   Lymph% 9.2 (L)   Lymph # 1.2   Mono% 12.9   Mono # 1.7 (H)   Eosinophil% 1.1   Eos # 0.2   Basophil% 0.3   Baso # 0.04   nRBC 0     CMP:   6/6/2018 06:24   Sodium 132 (L)   Potassium 3.9   Chloride 97   CO2 26   Anion Gap 9   BUN, Bld 10   Creatinine 1.0   eGFR if non African American >60.0   eGFR if African American >60.0   Glucose 204 (H)   Calcium 8.0 (L)   Phosphorus 2.8   Magnesium 1.8   Alkaline Phosphatase 115   Total Protein 6.4   Albumin 2.0 (L)   Total Bilirubin 0.4   AST 33   ALT 27     DM panel:   6/5/2018 08:14 6/5/2018 10:59 6/5/2018 14:01 6/5/2018 16:56 6/5/2018 22:40 6/6/2018 08:33   POCT Glucose 133 (H) 138 (H) 147 (H) 187 (H) 257 (H) 179 (H)     Significant Imaging: I have reviewed and interpreted all pertinent imaging results/findings within the past 24 hours.   XR foot right 6/5/18:  Soft tissue swelling and some gas in the soft tissues about the metatarsal and  proximal phalangeal level.  This has increased compared to prior.  History states status post I and D though radiographically a gas-forming infection not excluded.

## 2018-06-06 NOTE — ASSESSMENT & PLAN NOTE
- On lisinopril 40 mg daily and carvedilol 12.5 mg BID at home.  - BP currently low 100s-130s/50s-70s, at goal.  - Continue home ACE-I.  - Continue home BB.  - Monitor.

## 2018-06-06 NOTE — ANESTHESIA PREPROCEDURE EVALUATION
[facility]  Anesthesia Pre-Operative Evaluation         Patient Name: Billy Rivera  YOB: 1965  MRN: [id number]    SUBJECTIVE:     Pre-operative evaluation for Procedure(s) (LRB):  INCISION AND DRAINAGE, FOOT (Right)     06/06/2018    Billy Rivera is a 52 y.o. male w/ a significant PMHx of HTN, MI 2013 s/p 1 stent, DMT2, colon cancer s/p resection 2017 who presents after failing outpatient treatment for a right foot ulcer who presents for the above procedure.   Pt has had I&D x 2 previously    LDA:   L forearm 20g    Prev airway:  Ray, grade 1 view. Large,floppy epiglottis, obesity and oropharyngeal edema noted.    Drips: None documented.    Patient Active Problem List   Diagnosis    HTN (hypertension)    NSTEMI May 2013 - peak troponin 0.22    Dyslipidemia    Obesity, Class II, BMI 35-39.9, with comorbidity    CAD s/p PCI of LAD (SHELBY) in May 2013    BDR (background diabetic retinopathy) - Both Eyes    Type II or unspecified type diabetes mellitus with cardiovascular complication, uncontrolled    PVD (peripheral vascular disease)    Sleep apnea    Proteinuria    Screening    Edema    Hypertension associated with diabetes    Type 2 diabetes mellitus with diabetic peripheral angiopathy without gangrene, with long-term current use of insulin    Onychomycosis of multiple toenails with type 2 diabetes mellitus    Type 2 diabetes mellitus with hyperglycemia, with long-term current use of insulin    Type 2 diabetes mellitus 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, with long-term current use of insulin    Diabetic ulcer of right midfoot    Leukocytosis    Right foot infection    Fever    Diarrhea       Review of patient's allergies indicates:   Allergen Reactions    Penicillins Other (See Comments)     PCN allergy as a child - was told he went into a coma. Tolerates Cefazolin without adverse reactions     Shellfish containing products      Other reaction(s): Unknown    Vancomycin Itching    Bactrim  [sulfamethoxazole-trimethoprim] Rash       Current Inpatient Medications:   atorvastatin  80 mg Oral Daily    carvedilol  12.5 mg Oral BID    ceFAZolin (ANCEF) IVPB  2 g Intravenous Q8H    enoxaparin  40 mg Subcutaneous Daily    insulin aspart U-100  15 Units Subcutaneous TIDWM    insulin detemir U-100  45 Units Subcutaneous QHS    Lactobacillus rhamnosus GG  1 capsule Oral Daily    lisinopril  40 mg Oral Daily       No current facility-administered medications on file prior to encounter.      Current Outpatient Prescriptions on File Prior to Encounter   Medication Sig Dispense Refill    carvedilol (COREG) 12.5 MG tablet Take 1 tablet (12.5 mg total) by mouth 2 (two) times daily. 60 tablet 3    aspirin 81 MG Chew Take 81 mg by mouth every evening. swallows      atorvastatin (LIPITOR) 80 MG tablet Take 1 tablet (80 mg total) by mouth once daily. 30 tablet 11    blood sugar diagnostic Strp Strips and lancets    Use before meals and bedtime 150 strip 12    ciprofloxacin HCl (CIPRO) 500 MG tablet Take 1 tablet (500 mg total) by mouth every 12 (twelve) hours. 28 tablet 0    clindamycin (CLEOCIN) 300 MG capsule Take 1 capsule (300 mg total) by mouth every 6 (six) hours. 56 capsule 0    dulaglutide (TRULICITY) 1.5 mg/0.5 mL PnIj Inject 1.5 mg into the skin every 7 days. 4 Syringe 6    ibuprofen (ADVIL,MOTRIN) 800 MG tablet Take 1 tablet (800 mg total) by mouth 3 (three) times daily as needed for Pain. 50 tablet 0    insulin glargine (LANTUS SOLOSTAR) 100 unit/mL (3 mL) InPn pen INJECT 55 UNITS SUBCUTANEOUSLY IN THE EVENING 2 Box 3    insulin lispro (HUMALOG) 100 unit/mL injection Inject 20 Units into the skin 3 (three) times daily before meals. 6 vial 11    lisinopril (PRINIVIL,ZESTRIL) 40 MG tablet Take 1 tablet (40 mg total) by mouth once daily. 30 tablet 11    metFORMIN (GLUCOPHAGE) 1000 MG tablet Take 1  "tablet (1,000 mg total) by mouth 2 (two) times daily with meals. 60 tablet 6    nitroGLYCERIN (NITROSTAT) 0.4 MG SL tablet Place 1 tablet (0.4 mg total) under the tongue every 5 (five) minutes as needed for Chest pain. 30 tablet 1    pen needle, diabetic 31 gauge x 3/16" Ndle Inject 1 each into the skin 3 (three) times daily before meals. 100 each 12       Past Surgical History:   Procedure Laterality Date    COLONOSCOPY N/A 2/17/2017    Procedure: COLONOSCOPY;  Surgeon: Simon Gomez MD;  Location: [facility] (4TH FLR);  Service: Endoscopy;  Laterality: N/A;    CORONARY ANGIOPLASTY      INCISION AND DRAINAGE OF ABSCESS Right 6/2/2018    Procedure: INCISION AND DRAINAGE, ABSCESS;  Surgeon: Bridget Santana DPM;  [facility];  Service: General;  Laterality: Right;    TONSILLECTOMY         Social History     Social History    Marital status:      Spouse name: N/A    Number of children: N/A    Years of education: N/A     Occupational History    Not on file.     Social History Main Topics    Smoking status: Never Smoker    Smokeless tobacco: Never Used    Alcohol use Yes      Comment: rare x1 beer-     Drug use: No    Sexual activity: Not Currently     Other Topics Concern    Not on file     Social History Narrative    No narrative on file       OBJECTIVE:     Vital Signs Range (Last 24H):  Temp:  [36.9 °C (98.5 °F)-39.2 °C (102.5 °F)]   Pulse:  [57-89]   Resp:  [14-20]   BP: (109-135)/(56-76)   SpO2:  [91 %-98 %]       CBC:   Recent Labs      06/05/18   0404  06/06/18 0624   WBC  12.35  13.16*   RBC  4.03*  4.10*   HGB  11.8*  11.8*   HCT  34.9*  36.0*   PLT  272  274   MCV  87  88   MCH  29.3  28.8   MCHC  33.8  32.8       CMP:   Recent Labs      06/05/18   0404  06/06/18 0624   NA  133*  132*   K  3.9  3.9   CL  97  97   CO2  27  26   BUN  11  10   CREATININE  0.8  1.0   GLU  171*  204*   MG  1.9  1.8   PHOS  3.2  2.8   CALCIUM  8.2*  8.0*   ALBUMIN  2.0*  2.0*   PROT  6.1  " 6.4   ALKPHOS  89  115   ALT  21  27   AST  25  33   BILITOT  0.5  0.4       INR:  No results for input(s): PT, INR, PROTIME, APTT in the last 72 hours.    Diagnostic Studies: No relevant studies.    EKG: No recent studies available.    2D ECHO:  Results for orders placed or performed during the hospital encounter of 05/22/13   2D Echocardiogram with Color Flow Doppler   Result Value Ref Range    EF 60     Diastolic Dysfunction No          ASSESSMENT/PLAN:         Anesthesia Evaluation    I have reviewed the Patient Summary Reports.    I have reviewed the Nursing Notes.   I have reviewed the Medications.     Review of Systems  Anesthesia Hx:  History of prior surgery of interest to airway management or planning: Denies Family Hx of Anesthesia complications.   Denies Personal Hx of Anesthesia complications.   Hematology/Oncology:         -- Denies Anemia: --  Cancer in past history:    Cardiovascular:   Hypertension Past MI CAD  CABG/stent      PVD hyperlipidemia    Pulmonary:   Sleep Apnea    Hepatic/GI:   Denies GERD.    Neurological:   Denies CVA. Denies Seizures.    Endocrine:   Diabetes, type 2    Psych:  Psychiatric Normal           Physical Exam  General:  Well nourished    Airway/Jaw/Neck:  Airway Findings: Mouth Opening: Normal Tongue: Normal  General Airway Assessment: Adult  Mallampati: II  Jaw/Neck Findings:     Neck ROM: Normal ROM  Neck Findings:  Girth Increased, Short Neck      Dental:  Dental Findings: Periodontal disease, Mild    Chest/Lungs:  Chest/Lungs Clear    Heart/Vascular:  Heart Findings: Normal Heart murmur: negative       Mental Status:  Mental Status Findings:  Cooperative, Alert and Oriented         Anesthesia Plan  Type of Anesthesia, risks & benefits discussed:  Anesthesia Type:  general, MAC, regional  Patient's Preference:   Intra-op Monitoring Plan: standard ASA monitors  Intra-op Monitoring Plan Comments:   Post Op Pain Control Plan: multimodal analgesia, IV/PO Opioids PRN and per  primary service following discharge from PACU  Post Op Pain Control Plan Comments:   Induction:   IV  Beta Blocker:  Patient is on a Beta-Blocker and has received one dose within the past 24 hours (No further documentation required).       Informed Consent: Patient understands risks and agrees with Anesthesia plan.  Questions answered. Anesthesia consent signed with patient.  ASA Score: 3     Day of Surgery Review of History & Physical:  There are no significant changes.  H&P update referred to the surgeon.  H&P completed by Anesthesiologist.       Ready For Surgery From Anesthesia Perspective.

## 2018-06-06 NOTE — ASSESSMENT & PLAN NOTE
- Developed recurrent episodes of temp 100-101 F since initial OR debridement. Likely due to lack of source control, given foreign object remains embedded in wound. Low suspicion for urinary or pulmonary source of infection, given lack of symptoms/signs of systemic involvement. No signs/sx of allergic reaction to cephalosporin.  - Continue PO tylenol prn.  - IV tylenol prn for sustained fever.  - Blood cultures 6/5/18 pending.  - Monitor.

## 2018-06-06 NOTE — ASSESSMENT & PLAN NOTE
- WBC persistently ~13. Likely due to persistent wound infection, given foreign object remains embedded in foot. Ddx includes R leg cellulitis.  - Monitor.

## 2018-06-06 NOTE — CONSULTS
Food & Nutrition  Education    Diet Education:diabetes edu  Time Spent:15 Min  Learners:pt      Nutrition Education provided with handouts: CHO counting for people with diabetes; consistent CHO intake      Comments:pt received edu well  All questions and concerns answered. Dietitian's contact information provided.       Follow-Up:1x/wk    Please Re-consult as needed        Thanks!

## 2018-06-06 NOTE — SUBJECTIVE & OBJECTIVE
Interval History:   NAEON. Went to OR yesterday and copious amounts of purulent drainage noted in OR. Foreign body noted at 1st interdigital space but team was unable to remove. Unfortunately new surgical cultures were not obtained.     He is still having fevers up to 102.5. Denies night sweats or chills. Foot pain well controlled. Post op x-ray showed retained foreign body with increased soft tissue gas. Discussed concerns that infection will not clear without removal with podiatry and the patient.      Review of Systems   Constitutional: Negative for chills, diaphoresis and fever.   Respiratory: Negative for cough and shortness of breath.    Cardiovascular: Positive for leg swelling. Negative for chest pain.   Gastrointestinal: Negative for abdominal pain, diarrhea, nausea and vomiting.   Genitourinary: Negative for difficulty urinating, dysuria, frequency and hematuria.   Musculoskeletal: Positive for arthralgias (right foot) and joint swelling. Negative for back pain.   Skin: Positive for color change (redness RLE) and wound. Negative for rash.   Neurological: Negative for weakness and headaches.   Hematological: Negative for adenopathy.   Psychiatric/Behavioral: Negative for confusion. The patient is not nervous/anxious.    All other systems reviewed and are negative.    Objective:     Vital Signs (Most Recent):  Temp: 99.3 °F (37.4 °C) (06/06/18 0834)  Pulse: 65 (06/06/18 0834)  Resp: 16 (06/06/18 0834)  BP: 135/76 (06/06/18 0834)  SpO2: 98 % (06/06/18 0834) Vital Signs (24h Range):  Temp:  [97.9 °F (36.6 °C)-102.5 °F (39.2 °C)] 99.3 °F (37.4 °C)  Pulse:  [55-89] 65  Resp:  [13-20] 16  SpO2:  [91 %-100 %] 98 %  BP: (102-135)/(56-76) 135/76     Weight: 122.5 kg (270 lb)  Body mass index is 35.62 kg/m².    Estimated Creatinine Clearance: 117.1 mL/min (based on SCr of 1 mg/dL).    Physical Exam   Constitutional: He is oriented to person, place, and time. He appears well-developed and well-nourished. No distress.    HENT:   Head: Normocephalic and atraumatic.   Eyes: Conjunctivae are normal. No scleral icterus.   Cardiovascular: Normal rate, regular rhythm and intact distal pulses.    No murmur heard.  Pulmonary/Chest: Effort normal. No respiratory distress. He has no wheezes.   Abdominal: Soft. He exhibits no distension. There is no tenderness. There is no guarding.   Musculoskeletal: He exhibits edema and tenderness (right foot).   Neurological: He is alert and oriented to person, place, and time.   Skin: Skin is warm and dry. He is not diaphoretic. There is erythema.   Right foot dressed. Dressing c/d/i. Digits appear dusky and edematous. Cellulitis to RLE improved. TTP.    Psychiatric: He has a normal mood and affect. His behavior is normal. Thought content normal.       Significant Labs:   Blood Culture:     Recent Labs  Lab 06/01/18  1445 06/02/18  2111 06/02/18  2115   LABBLOO No Growth to date  No Growth to date  No Growth to date  No Growth to date  No Growth to date No Growth to date  No Growth to date  No Growth to date  No Growth to date No Growth to date  No Growth to date  No Growth to date  No Growth to date     CBC:     Recent Labs  Lab 06/05/18  0404 06/06/18  0624   WBC 12.35 13.16*   HGB 11.8* 11.8*   HCT 34.9* 36.0*    274     CMP:     Recent Labs  Lab 06/05/18  0404 06/06/18  0624   * 132*   K 3.9 3.9   CL 97 97   CO2 27 26   * 204*   BUN 11 10   CREATININE 0.8 1.0   CALCIUM 8.2* 8.0*   PROT 6.1 6.4   ALBUMIN 2.0* 2.0*   BILITOT 0.5 0.4   ALKPHOS 89 115   AST 25 33   ALT 21 27   ANIONGAP 9 9   EGFRNONAA >60.0 >60.0     Wound Culture:     Recent Labs  Lab 06/02/18  1218 06/02/18  1226   LABAERO STREPTOCOCCUS AGALACTIAE (GROUP B)ManyBeta-hemolytic streptococci are routinely susceptible to penicillins,cephalosporins and carbapenems.Susceptibility testing not routinely performed STREPTOCOCCUS AGALACTIAE (GROUP B)ManyBeta-hemolytic streptococci are routinely susceptible to  penicillins,cephalosporins and carbapenems.Susceptibility testing not routinely performed     All pertinent labs within the past 24 hours have been reviewed.    Significant Imaging: I have reviewed all pertinent imaging results/findings within the past 24 hours.

## 2018-06-06 NOTE — PROGRESS NOTES
Notified Med Team 2 of patient's temp 0f 102.5 last night. Pt was given PRN Acetaminophen. Temp went down too 100.4. Currently, Pt's temp is 101.0. PRN Acetaminophen administered. Will continue to monitor patient.

## 2018-06-07 ENCOUNTER — ANESTHESIA (OUTPATIENT)
Dept: SURGERY | Facility: HOSPITAL | Age: 53
DRG: 982 | End: 2018-06-07
Payer: COMMERCIAL

## 2018-06-07 ENCOUNTER — SURGERY (OUTPATIENT)
Age: 53
End: 2018-06-07

## 2018-06-07 LAB
ALBUMIN SERPL BCP-MCNC: 2 G/DL
ALP SERPL-CCNC: 103 U/L
ALT SERPL W/O P-5'-P-CCNC: 26 U/L
ANION GAP SERPL CALC-SCNC: 9 MMOL/L
AST SERPL-CCNC: 33 U/L
BACTERIA BLD CULT: NORMAL
BACTERIA BLD CULT: NORMAL
BACTERIA SPEC ANAEROBE CULT: NORMAL
BACTERIA SPEC ANAEROBE CULT: NORMAL
BASOPHILS # BLD AUTO: 0.04 K/UL
BASOPHILS NFR BLD: 0.4 %
BILIRUB SERPL-MCNC: 0.3 MG/DL
BUN SERPL-MCNC: 10 MG/DL
CALCIUM SERPL-MCNC: 8.3 MG/DL
CHLORIDE SERPL-SCNC: 99 MMOL/L
CO2 SERPL-SCNC: 27 MMOL/L
CREAT SERPL-MCNC: 1 MG/DL
DIFFERENTIAL METHOD: ABNORMAL
EOSINOPHIL # BLD AUTO: 0.3 K/UL
EOSINOPHIL NFR BLD: 2.6 %
ERYTHROCYTE [DISTWIDTH] IN BLOOD BY AUTOMATED COUNT: 12 %
EST. GFR  (AFRICAN AMERICAN): >60 ML/MIN/1.73 M^2
EST. GFR  (NON AFRICAN AMERICAN): >60 ML/MIN/1.73 M^2
GLUCOSE SERPL-MCNC: 251 MG/DL
HCT VFR BLD AUTO: 35.3 %
HGB BLD-MCNC: 11.7 G/DL
IMM GRANULOCYTES # BLD AUTO: 0.22 K/UL
IMM GRANULOCYTES NFR BLD AUTO: 2.1 %
LYMPHOCYTES # BLD AUTO: 1.5 K/UL
LYMPHOCYTES NFR BLD: 14.5 %
MAGNESIUM SERPL-MCNC: 1.7 MG/DL
MCH RBC QN AUTO: 29.3 PG
MCHC RBC AUTO-ENTMCNC: 33.1 G/DL
MCV RBC AUTO: 88 FL
MONOCYTES # BLD AUTO: 1.3 K/UL
MONOCYTES NFR BLD: 12.1 %
NEUTROPHILS # BLD AUTO: 7.2 K/UL
NEUTROPHILS NFR BLD: 68.3 %
NRBC BLD-RTO: 0 /100 WBC
PHOSPHATE SERPL-MCNC: 3.2 MG/DL
PLATELET # BLD AUTO: 257 K/UL
PMV BLD AUTO: 9.6 FL
POCT GLUCOSE: 181 MG/DL (ref 70–110)
POCT GLUCOSE: 182 MG/DL (ref 70–110)
POCT GLUCOSE: 187 MG/DL (ref 70–110)
POCT GLUCOSE: 214 MG/DL (ref 70–110)
POTASSIUM SERPL-SCNC: 4 MMOL/L
PROT SERPL-MCNC: 6.4 G/DL
RBC # BLD AUTO: 4 M/UL
SODIUM SERPL-SCNC: 135 MMOL/L
WBC # BLD AUTO: 10.51 K/UL

## 2018-06-07 PROCEDURE — 25000003 PHARM REV CODE 250: Performed by: STUDENT IN AN ORGANIZED HEALTH CARE EDUCATION/TRAINING PROGRAM

## 2018-06-07 PROCEDURE — 37000008 HC ANESTHESIA 1ST 15 MINUTES: Performed by: PODIATRIST

## 2018-06-07 PROCEDURE — 11000001 HC ACUTE MED/SURG PRIVATE ROOM

## 2018-06-07 PROCEDURE — 36000707: Performed by: PODIATRIST

## 2018-06-07 PROCEDURE — 99233 SBSQ HOSP IP/OBS HIGH 50: CPT | Mod: ,,, | Performed by: HOSPITALIST

## 2018-06-07 PROCEDURE — 36000706: Performed by: PODIATRIST

## 2018-06-07 PROCEDURE — 71000015 HC POSTOP RECOV 1ST HR: Performed by: PODIATRIST

## 2018-06-07 PROCEDURE — 63600175 PHARM REV CODE 636 W HCPCS: Performed by: NURSE ANESTHETIST, CERTIFIED REGISTERED

## 2018-06-07 PROCEDURE — 25000003 PHARM REV CODE 250

## 2018-06-07 PROCEDURE — 25000003 PHARM REV CODE 250: Performed by: PODIATRIST

## 2018-06-07 PROCEDURE — 63600175 PHARM REV CODE 636 W HCPCS: Performed by: STUDENT IN AN ORGANIZED HEALTH CARE EDUCATION/TRAINING PROGRAM

## 2018-06-07 PROCEDURE — 37000009 HC ANESTHESIA EA ADD 15 MINS: Performed by: PODIATRIST

## 2018-06-07 PROCEDURE — 63600175 PHARM REV CODE 636 W HCPCS: Performed by: PHYSICIAN ASSISTANT

## 2018-06-07 PROCEDURE — 25000003 PHARM REV CODE 250: Performed by: NURSE ANESTHETIST, CERTIFIED REGISTERED

## 2018-06-07 PROCEDURE — 88300 SURGICAL PATH GROSS: CPT | Performed by: PATHOLOGY

## 2018-06-07 PROCEDURE — 80053 COMPREHEN METABOLIC PANEL: CPT

## 2018-06-07 PROCEDURE — 20005 PR I&D SOFT TISSUE ABSCESS SUBFASCIAL: CPT | Mod: 58,,, | Performed by: PODIATRIST

## 2018-06-07 PROCEDURE — 75716 ARTERY X-RAYS ARMS/LEGS: CPT | Mod: 26,,, | Performed by: INTERNAL MEDICINE

## 2018-06-07 PROCEDURE — 75625 CONTRAST EXAM ABDOMINL AORTA: CPT | Mod: 26,,, | Performed by: INTERNAL MEDICINE

## 2018-06-07 PROCEDURE — 99152 MOD SED SAME PHYS/QHP 5/>YRS: CPT | Mod: ,,, | Performed by: INTERNAL MEDICINE

## 2018-06-07 PROCEDURE — 99024 POSTOP FOLLOW-UP VISIT: CPT | Mod: ,,, | Performed by: PODIATRIST

## 2018-06-07 PROCEDURE — D9220A PRA ANESTHESIA: Mod: ,,, | Performed by: ANESTHESIOLOGY

## 2018-06-07 PROCEDURE — 28192 REMOVAL OF FOOT FOREIGN BODY: CPT | Mod: 58,59,, | Performed by: PODIATRIST

## 2018-06-07 PROCEDURE — 36245 INS CATH ABD/L-EXT ART 1ST: CPT | Mod: RT,,, | Performed by: INTERNAL MEDICINE

## 2018-06-07 PROCEDURE — 0HBMXZZ EXCISION OF RIGHT FOOT SKIN, EXTERNAL APPROACH: ICD-10-PCS | Performed by: PODIATRIST

## 2018-06-07 PROCEDURE — 25000003 PHARM REV CODE 250: Performed by: INTERNAL MEDICINE

## 2018-06-07 PROCEDURE — 63600175 PHARM REV CODE 636 W HCPCS

## 2018-06-07 PROCEDURE — 85025 COMPLETE CBC W/AUTO DIFF WBC: CPT

## 2018-06-07 PROCEDURE — B41DYZZ FLUOROSCOPY OF AORTA AND BILATERAL LOWER EXTREMITY ARTERIES USING OTHER CONTRAST: ICD-10-PCS | Performed by: INTERNAL MEDICINE

## 2018-06-07 PROCEDURE — 84100 ASSAY OF PHOSPHORUS: CPT

## 2018-06-07 PROCEDURE — 36415 COLL VENOUS BLD VENIPUNCTURE: CPT

## 2018-06-07 PROCEDURE — 82962 GLUCOSE BLOOD TEST: CPT | Performed by: PODIATRIST

## 2018-06-07 PROCEDURE — 83735 ASSAY OF MAGNESIUM: CPT

## 2018-06-07 PROCEDURE — 71000044 HC DOSC ROUTINE RECOVERY FIRST HOUR: Performed by: PODIATRIST

## 2018-06-07 PROCEDURE — C1894 INTRO/SHEATH, NON-LASER: HCPCS

## 2018-06-07 PROCEDURE — 0JCQ0ZZ EXTIRPATION OF MATTER FROM RIGHT FOOT SUBCUTANEOUS TISSUE AND FASCIA, OPEN APPROACH: ICD-10-PCS | Performed by: PODIATRIST

## 2018-06-07 PROCEDURE — 88300 SURGICAL PATH GROSS: CPT | Mod: 26,,, | Performed by: PATHOLOGY

## 2018-06-07 RX ORDER — GLYCOPYRROLATE 0.2 MG/ML
INJECTION INTRAMUSCULAR; INTRAVENOUS
Status: DISCONTINUED | OUTPATIENT
Start: 2018-06-07 | End: 2018-06-07

## 2018-06-07 RX ORDER — SODIUM CHLORIDE 9 MG/ML
INJECTION, SOLUTION INTRAVENOUS CONTINUOUS PRN
Status: DISCONTINUED | OUTPATIENT
Start: 2018-06-07 | End: 2018-06-07

## 2018-06-07 RX ORDER — LIDOCAINE HYDROCHLORIDE 10 MG/ML
INJECTION, SOLUTION EPIDURAL; INFILTRATION; INTRACAUDAL; PERINEURAL
Status: DISCONTINUED | OUTPATIENT
Start: 2018-06-07 | End: 2018-06-07 | Stop reason: HOSPADM

## 2018-06-07 RX ORDER — SODIUM CHLORIDE 9 MG/ML
3 INJECTION, SOLUTION INTRAVENOUS CONTINUOUS
Status: ACTIVE | OUTPATIENT
Start: 2018-06-07 | End: 2018-06-07

## 2018-06-07 RX ORDER — MEPERIDINE HYDROCHLORIDE 50 MG/ML
12.5 INJECTION INTRAMUSCULAR; INTRAVENOUS; SUBCUTANEOUS ONCE
Status: DISCONTINUED | OUTPATIENT
Start: 2018-06-07 | End: 2018-06-09

## 2018-06-07 RX ORDER — LIDOCAINE HCL/PF 100 MG/5ML
SYRINGE (ML) INTRAVENOUS
Status: DISCONTINUED | OUTPATIENT
Start: 2018-06-07 | End: 2018-06-07

## 2018-06-07 RX ORDER — INSULIN ASPART 100 [IU]/ML
17 INJECTION, SOLUTION INTRAVENOUS; SUBCUTANEOUS
Status: DISCONTINUED | OUTPATIENT
Start: 2018-06-07 | End: 2018-06-08

## 2018-06-07 RX ORDER — PHENYLEPHRINE HYDROCHLORIDE 10 MG/ML
INJECTION INTRAVENOUS
Status: DISCONTINUED | OUTPATIENT
Start: 2018-06-07 | End: 2018-06-07

## 2018-06-07 RX ORDER — SODIUM CHLORIDE 0.9 % (FLUSH) 0.9 %
3 SYRINGE (ML) INJECTION
Status: DISCONTINUED | OUTPATIENT
Start: 2018-06-07 | End: 2018-06-15 | Stop reason: HOSPADM

## 2018-06-07 RX ORDER — MIDAZOLAM HYDROCHLORIDE 1 MG/ML
INJECTION, SOLUTION INTRAMUSCULAR; INTRAVENOUS
Status: DISCONTINUED | OUTPATIENT
Start: 2018-06-07 | End: 2018-06-07

## 2018-06-07 RX ORDER — FENTANYL CITRATE 50 UG/ML
25 INJECTION, SOLUTION INTRAMUSCULAR; INTRAVENOUS EVERY 5 MIN PRN
Status: DISCONTINUED | OUTPATIENT
Start: 2018-06-07 | End: 2018-06-07

## 2018-06-07 RX ORDER — PROPOFOL 10 MG/ML
VIAL (ML) INTRAVENOUS
Status: DISCONTINUED | OUTPATIENT
Start: 2018-06-07 | End: 2018-06-07

## 2018-06-07 RX ORDER — PROPOFOL 10 MG/ML
VIAL (ML) INTRAVENOUS CONTINUOUS PRN
Status: DISCONTINUED | OUTPATIENT
Start: 2018-06-07 | End: 2018-06-07

## 2018-06-07 RX ORDER — BUPIVACAINE HYDROCHLORIDE 2.5 MG/ML
INJECTION, SOLUTION EPIDURAL; INFILTRATION; INTRACAUDAL
Status: DISCONTINUED | OUTPATIENT
Start: 2018-06-07 | End: 2018-06-07 | Stop reason: HOSPADM

## 2018-06-07 RX ORDER — FENTANYL CITRATE 50 UG/ML
INJECTION, SOLUTION INTRAMUSCULAR; INTRAVENOUS
Status: DISCONTINUED | OUTPATIENT
Start: 2018-06-07 | End: 2018-06-07

## 2018-06-07 RX ADMIN — CARVEDILOL 12.5 MG: 12.5 TABLET, FILM COATED ORAL at 10:06

## 2018-06-07 RX ADMIN — FENTANYL CITRATE 25 MCG: 50 INJECTION, SOLUTION INTRAMUSCULAR; INTRAVENOUS at 03:06

## 2018-06-07 RX ADMIN — CEFAZOLIN 2 G: 1 INJECTION, POWDER, FOR SOLUTION INTRAMUSCULAR; INTRAVENOUS at 06:06

## 2018-06-07 RX ADMIN — BUPIVACAINE HYDROCHLORIDE 9 ML: 2.5 INJECTION, SOLUTION EPIDURAL; INFILTRATION; INTRACAUDAL; PERINEURAL at 03:06

## 2018-06-07 RX ADMIN — LIDOCAINE HYDROCHLORIDE 100 MG: 20 INJECTION, SOLUTION INTRAVENOUS at 03:06

## 2018-06-07 RX ADMIN — GLYCOPYRROLATE 0.2 MG: 0.2 INJECTION, SOLUTION INTRAMUSCULAR; INTRAVENOUS at 04:06

## 2018-06-07 RX ADMIN — SODIUM CHLORIDE: 0.9 INJECTION, SOLUTION INTRAVENOUS at 02:06

## 2018-06-07 RX ADMIN — PHENYLEPHRINE HYDROCHLORIDE 50 MCG: 10 INJECTION INTRAVENOUS at 04:06

## 2018-06-07 RX ADMIN — SODIUM CHLORIDE 3 ML/KG/HR: 0.9 INJECTION, SOLUTION INTRAVENOUS at 10:06

## 2018-06-07 RX ADMIN — PROPOFOL 30 MG: 10 INJECTION, EMULSION INTRAVENOUS at 03:06

## 2018-06-07 RX ADMIN — SODIUM CHLORIDE: 0.9 INJECTION, SOLUTION INTRAVENOUS at 04:06

## 2018-06-07 RX ADMIN — ENOXAPARIN SODIUM 40 MG: 100 INJECTION SUBCUTANEOUS at 06:06

## 2018-06-07 RX ADMIN — PROPOFOL 75 MCG/KG/MIN: 10 INJECTION, EMULSION INTRAVENOUS at 03:06

## 2018-06-07 RX ADMIN — MIDAZOLAM HYDROCHLORIDE 2 MG: 1 INJECTION, SOLUTION INTRAMUSCULAR; INTRAVENOUS at 03:06

## 2018-06-07 RX ADMIN — OXYCODONE HYDROCHLORIDE 5 MG: 5 TABLET ORAL at 09:06

## 2018-06-07 RX ADMIN — CARVEDILOL 12.5 MG: 12.5 TABLET, FILM COATED ORAL at 09:06

## 2018-06-07 RX ADMIN — ATORVASTATIN CALCIUM 80 MG: 20 TABLET, FILM COATED ORAL at 10:06

## 2018-06-07 RX ADMIN — Medication 1 CAPSULE: at 10:06

## 2018-06-07 RX ADMIN — INSULIN ASPART 17 UNITS: 100 INJECTION, SOLUTION INTRAVENOUS; SUBCUTANEOUS at 06:06

## 2018-06-07 RX ADMIN — LIDOCAINE HYDROCHLORIDE 14 ML: 10 INJECTION, SOLUTION EPIDURAL; INFILTRATION; INTRACAUDAL; PERINEURAL at 03:06

## 2018-06-07 RX ADMIN — INSULIN DETEMIR 50 UNITS: 100 INJECTION, SOLUTION SUBCUTANEOUS at 09:06

## 2018-06-07 RX ADMIN — LISINOPRIL 40 MG: 20 TABLET ORAL at 10:06

## 2018-06-07 RX ADMIN — INSULIN ASPART 2 UNITS: 100 INJECTION, SOLUTION INTRAVENOUS; SUBCUTANEOUS at 09:06

## 2018-06-07 NOTE — CONSULTS
[facility]  Interventional Cardiology  Consult Note    Patient Name: Billy Sheffield Miguel  MRN: [id number]  Admission Date: 6/1/2018  Hospital Length of Stay: 6 days  Code Status: Full Code   Attending Provider: Josiah Sawyer, *  Consulting Provider: Bren Villagomez MD  Primary Care Physician: BRAD Baker MD  Principal Problem:Diabetic ulcer of right midfoot    Patient information was obtained from patient and past medical records.     Inpatient consult to Interventional Cardiology  Consult performed by: BREN VILLAGOMEZ  Consult ordered by: JARAD MORAN  Reason for consult: Right leg cellulitis        Subjective:     Chief Complaint:  Right leg cellulitis    HPI:  52-year-old male with HTN, MI 2013 s/p 1 stent, DM2, colon cancer s/p resection 2017 who presents after failing outpatient treatment for a right ulcer.  Patient states that he stepped on a piece of glass at work where he is  on the 19th of May and it went through his shoe->PCP visit after formed a blister-> clinda and cipro outpatient->no improvement->cellulitis and ulcer of right midfoot-I and D on 6/2 and 6/5   Cultures with GBS, have de-escalated abx to ancef. No evidence of osteo on MRI. ID and Podiatry following    Went to OR again for removal of remaining piece of glass in foot. Found to have purulent drainage from wound, which was debrided. However, glass was found to be too deeply embedded in tissue and was unable to be retrieved despite multiple attempts. Post op XR of foot showed soft tissue swelling and gas. Immediately post op, pt's fever recurred with temp up to 102 for 8 hours overnight.    Right second toe noted to be blue/pale on exam. Thought to be bruising from pressure applied on toe in OR during debridement of foot vs ischemia (given unknown RLE VASILIY). Monitored for swelling, per podiatry recs    Resting VASILIY:    The right ankle brachial index was 1.19 which is normal.   The left ankle brachial  index was 1.15 which is normal.     Exercise VASILIY:    Post exercise right ankle pressure was 164 mmHg, resulting in a right post-exercise VASILIY of 1.04.   Post exercise left ankle pressure was 170 mmHg, resulting in a left post-exercise VASILIY of 1.08.         U/s 6/2/2018  TEST DESCRIPTION   The patient exercised for 5 mins. at 2 mph on a 12 degree incline, traveling a distance of 264 meters.       CONCLUSIONS   Right:  Pre- and post-exercise VASILIY's are both within the normal range; however there was a drop after exercise, which possibly indicates mild occult PAD in the right lower extremity.     Left:  Pre- and post-exercise VASILIY's are both within the normal range; however there was a drop after exercise, which possibly indicates mild occult PAD in the left lower extremity.    Ankle-brachial index of 1.14 on the left.  VASILIY on the right not obtained secondary to patient's lower extremity cellulitis/wound.    No hemodynamically significant stenosis demonstrated in the right or left lower extremity arterial system.    Past Medical History:   Diagnosis Date    Allergy     CAD (coronary artery disease), native coronary artery 6/25/2013    Cancer     Diabetes mellitus     Diabetes mellitus, type 2     Disorder of kidney and ureter     Heart attack 04/2012    Hyperlipidemia     Hypertension     Muscular pain     post-op after colonoscopy       Past Surgical History:   Procedure Laterality Date    COLONOSCOPY N/A 2/17/2017    Procedure: COLONOSCOPY;  Surgeon: Simon Gomez MD;  Location: [facility] (4TH FLR);  Service: Endoscopy;  Laterality: N/A;    CORONARY ANGIOPLASTY      INCISION AND DRAINAGE FOOT Right 6/5/2018    Procedure: INCISION AND DRAINAGE, FOOT;  Surgeon: Bridget Santana DPM;  Location: [address];  Service: Podiatry;  Laterality: Right;  Request mini C arm in room. request wound vac with medium black sponge    INCISION AND DRAINAGE OF ABSCESS Right 6/2/2018    Procedure: INCISION AND DRAINAGE,  "ABSCESS;  Surgeon: Bridget Santana DPM;  Location: [facility] OR [address] [place] [address];  Service: General;  Laterality: Right;    TONSILLECTOMY         Review of patient's allergies indicates:   Allergen Reactions    Penicillins Other (See Comments)     PCN allergy as a child - was told he went into a coma. Tolerates Cefazolin without adverse reactions    Shellfish containing products      Other reaction(s): Unknown    Vancomycin Itching    Bactrim  [sulfamethoxazole-trimethoprim] Rash       PTA Medications   Medication Sig    carvedilol (COREG) 12.5 MG tablet Take 1 tablet (12.5 mg total) by mouth 2 (two) times daily.    aspirin 81 MG Chew Take 81 mg by mouth every evening. swallows    atorvastatin (LIPITOR) 80 MG tablet Take 1 tablet (80 mg total) by mouth once daily.    blood sugar diagnostic Strp Strips and lancets    Use before meals and bedtime    ciprofloxacin HCl (CIPRO) 500 MG tablet Take 1 tablet (500 mg total) by mouth every 12 (twelve) hours.    clindamycin (CLEOCIN) 300 MG capsule Take 1 capsule (300 mg total) by mouth every 6 (six) hours.    dulaglutide (TRULICITY) 1.5 mg/0.5 mL PnIj Inject 1.5 mg into the skin every 7 days.    ibuprofen (ADVIL,MOTRIN) 800 MG tablet Take 1 tablet (800 mg total) by mouth 3 (three) times daily as needed for Pain.    insulin glargine (LANTUS SOLOSTAR) 100 unit/mL (3 mL) InPn pen INJECT 55 UNITS SUBCUTANEOUSLY IN THE EVENING    insulin lispro (HUMALOG) 100 unit/mL injection Inject 20 Units into the skin 3 (three) times daily before meals.    lisinopril (PRINIVIL,ZESTRIL) 40 MG tablet Take 1 tablet (40 mg total) by mouth once daily.    metFORMIN (GLUCOPHAGE) 1000 MG tablet Take 1 tablet (1,000 mg total) by mouth 2 (two) times daily with meals.    nitroGLYCERIN (NITROSTAT) 0.4 MG SL tablet Place 1 tablet (0.4 mg total) under the tongue every 5 (five) minutes as needed for Chest pain.    pen needle, diabetic 31 gauge x 3/16" Ndle Inject 1 each into the skin 3 (three) times " daily before meals.     Family History     Problem Relation (Age of Onset)    Heart disease Mother, Brother        Social History Main Topics    Smoking status: Never Smoker    Smokeless tobacco: Never Used    Alcohol use Yes      Comment: rare x1 beer-     Drug use: No    Sexual activity: Not Currently     Review of Systems   All other systems reviewed and are negative.    Objective:     Vital Signs (Most Recent):  Temp: 98.6 °F (37 °C) (06/07/18 0407)  Pulse: 66 (06/07/18 0407)  Resp: 18 (06/07/18 0407)  BP: (!) 115/58 (06/07/18 0407)  SpO2: 96 % (06/07/18 0407) Vital Signs (24h Range):  Temp:  [98.5 °F (36.9 °C)-101.1 °F (38.4 °C)] 98.6 °F (37 °C)  Pulse:  [61-75] 66  Resp:  [16-18] 18  SpO2:  [92 %-98 %] 96 %  BP: (109-139)/(58-76) 115/58     Weight: 122.5 kg (270 lb)  Body mass index is 35.62 kg/m².    SpO2: 96 %  O2 Device (Oxygen Therapy): room air      Intake/Output Summary (Last 24 hours) at 06/07/18 0730  Last data filed at 06/07/18 0408   Gross per 24 hour   Intake             1330 ml   Output             1100 ml   Net              230 ml       Lines/Drains/Airways     Peripheral Intravenous Line                 Peripheral IV - Single Lumen 06/04/18 0539 Left Forearm 3 days                Physical Exam  General: alert, awake and oriented x 3  Eyes:PERRL.   Neck:no JVD   Lungs:  clear to auscultation bilaterally   Cardiovascular: Heart: regular rate and rhythm, S1, S2 normal, no murmur, click, rub or gallop.   Chest Wall: no tenderness.   Extremities: no cyanosis or edema.   Abdomen/Rectal: Abdomen: soft, non-tender non-distented; bowel sounds normal  Neurologic: Normal strength and tone. No focal numbness or weakness     LEFT RIGHT   RADIAL 2+ 2+   ULNAR     BRACHIAL     FEMORAL 2+ 2+   DP 1+ 1+   TP 1+ 1+   HUNTER'S TEST Normal Normal   BARBEAU TEST       Right foot wound  Significant Labs:   CMP   Recent Labs  Lab 06/06/18  0624 06/07/18  0339   * 135*   K 3.9 4.0   CL 97 99   CO2 26 27   GLU  204* 251*   BUN 10 10   CREATININE 1.0 1.0   CALCIUM 8.0* 8.3*   PROT 6.4 6.4   ALBUMIN 2.0* 2.0*   BILITOT 0.4 0.3   ALKPHOS 115 103   AST 33 33   ALT 27 26   ANIONGAP 9 9   ESTGFRAFRICA >60.0 >60.0   EGFRNONAA >60.0 >60.0   , CBC   Recent Labs  Lab 06/06/18  0624 06/07/18  0339   WBC 13.16* 10.51   HGB 11.8* 11.7*   HCT 36.0* 35.3*    257    and INR No results for input(s): INR, PROTIME in the last 48 hours.    Significant Imaging: Echocardiogram:   2D echo with color flow doppler:   Results for orders placed or performed during the hospital encounter of 05/22/13   2D Echocardiogram with Color Flow Doppler   Result Value Ref Range    EF 60     Diastolic Dysfunction No      Assessment and Plan:     * Diabetic ulcer of right midfoot    Normal VASILIY on US last year-mild disease with exercise VASILIY  US normal this admission   Will proceed with B/L Angiogram    - Keep NPO   -The risks, benefits and alternatives of the procedure were explained to the patient.   -The risks of  angiography include but are not limited to: bleeding, infection, heart rhythm abnormalities, allergic reactions, kidney injury and potential need for dialysis, stroke and death.   -The risks of moderate sedation include hypotension, respiratory depression, arrhythmias, bronchospasm, and death.   - Informed consent was obtained and the  patient is agreeable to proceed with the procedure.            VTE Risk Mitigation         Ordered     enoxaparin injection 40 mg  Daily      06/05/18 1706     IP VTE HIGH RISK PATIENT  Once      06/02/18 0504          Thank you for your consult.     Caleb Villagomez MD  Interventional Cardiology   [facility]

## 2018-06-07 NOTE — PROGRESS NOTES
Report called to PAPI Cantrell on 9th floor.  VSS no c/o pain or nausea, consents in chart.  .  IV flushed and saline locked.  Pt will be transported to room 952A once staff is available.

## 2018-06-07 NOTE — HPI
52-year-old male with HTN, MI 2013 s/p 1 stent, DM2, colon cancer s/p resection 2017 who presents after failing outpatient treatment for a right ulcer.  Patient states that he stepped on a piece of glass at work where he is  on the 19th of May and it went through his shoe->PCP visit after formed a blister-> clinda and cipro outpatient->no improvement->cellulitis and ulcer of right midfoot-I and D on 6/2 and 6/5   Cultures with GBS, have de-escalated abx to ancef. No evidence of osteo on MRI. ID and Podiatry following    Went to OR again for removal of remaining piece of glass in foot. Found to have purulent drainage from wound, which was debrided. However, glass was found to be too deeply embedded in tissue and was unable to be retrieved despite multiple attempts. Post op XR of foot showed soft tissue swelling and gas. Immediately post op, pt's fever recurred with temp up to 102 for 8 hours overnight.    Right second toe noted to be blue/pale on exam. Thought to be bruising from pressure applied on toe in OR during debridement of foot vs ischemia (given unknown RLE VASILIY). Monitored for swelling, per podiatry recs    Resting VASILIY:    The right ankle brachial index was 1.19 which is normal.   The left ankle brachial index was 1.15 which is normal.     Exercise VASILIY:    Post exercise right ankle pressure was 164 mmHg, resulting in a right post-exercise VASILIY of 1.04.   Post exercise left ankle pressure was 170 mmHg, resulting in a left post-exercise VASILIY of 1.08.         U/s 6/2/2018  TEST DESCRIPTION   The patient exercised for 5 mins. at 2 mph on a 12 degree incline, traveling a distance of 264 meters.       CONCLUSIONS   Right:  Pre- and post-exercise VASILIY's are both within the normal range; however there was a drop after exercise, which possibly indicates mild occult PAD in the right lower extremity.     Left:  Pre- and post-exercise VASILIY's are both within the normal range; however there was a drop after exercise,  which possibly indicates mild occult PAD in the left lower extremity.    Ankle-brachial index of 1.14 on the left.  VASILIY on the right not obtained secondary to patient's lower extremity cellulitis/wound.    No hemodynamically significant stenosis demonstrated in the right or left lower extremity arterial system.

## 2018-06-07 NOTE — OP NOTE
Operative Note       Surgery Date: 6/7/2018     Surgeon(s) and Role:     * Tonya Valle MD - Resident - Assisting     * Emelia Brock DPM - Primary    Pre-op Diagnosis:  Diabetic ulcer of foot associated with diabetes mellitus due to underlying condition, limited to breakdown of skin [E08.621, L97.501]  Diabetic ulcer of right midfoot associated with diabetes mellitus of other type, unspecified ulcer stage [E13.621, L97.419]  Diabetic ulcer of right midfoot associated with type 2 diabetes mellitus, with other ulcer severity [E11.621, L97.418]    Post-op Diagnosis: Post-Op Diagnosis Codes:     * Diabetic ulcer of foot associated with diabetes mellitus due to underlying condition, limited to breakdown of skin [E08.621, L97.501]     * Diabetic ulcer of right midfoot associated with diabetes mellitus of other type, unspecified ulcer stage [E13.621, L97.419]     * Diabetic ulcer of right midfoot associated with type 2 diabetes mellitus, with other ulcer severity [E11.621, L97.418]    Procedure(s) (LRB):  INCISION AND DRAINAGE, FOOT (Right)  REMOVAL, FOREIGN BODY, FOOT (Right)    Anesthesia: General/MAC    Procedure in Detail/Findings:  The patient was brought to the operating room on a stretcher and placed on the operating table in a supine position. Following the successful induction of MAC anesthesia, a local anesthetic block consisting of aproximately 18cc of  1:1 mixture of 1% lidocaine plain + 0.25% marcaine plain was injected. Then, the right foot was scrubbed, prepped and draped in the usual aseptic manner using Chloroprep (allergy to shellfish). A marking pen was utilized to create an incision guide along plantar and dorsal wounds. A time out was performed.No tourniquet used for the duration of the procedure.     Attention was then directed  To the right dorsal foot. A #15 blade was used to make an incision connecting the wound at the 2nd interdigital space to the dorsal proximal midfoot. It should be noted  that the Right second toe had a moderately dusky appearance.  Purulent drainage noted from this area along with liquefactive necrosis extending from 2nd and 3rd intermetatarsal space extending into each toe. Purulent drainage also expressed from 2nd interdigital space. Buford elevator used to inspect wound. Tracking noted to plantar wound with through and through connection. A #15 blade was used to extend the dorsal incision to the plantar wound via the 2nd interdigital space towards the plantar wound. Purulent drainage noted through this deep connection.     Next attention was directed to the plantar wound. A #15 blade was used to excise wound at the plantar right foot in an elliptical fashion.  Next a straight hemostat was used to triangulate foreign body with C arm.  Foreign body noted deep in soft tissues directly adjacent to medial 2nd metatarsal shaft nearly directly on bone. Straight hemostat used to remove large piece of glass embedded deep in this area. C arm used to confirm removal of glass. Upon review of C arm images small  (<1mm) piece of residual glass noted in the soft tissues,  negligible in size <1mm. Further dissection to remove this small piece of  residual glass would  cause additional significant damage to soft tissues and may not be retrievable. Surgical sites copiously flushed with 3L NS. Plantar wound approximated with 2-0 nylon with simple sutures. Dorsal incision closed with 3-0 nylon. Final wound measurements right plantar foot: 7ydv1wnh6.5cm, dorsal foot : 3atn1urt2.5cm, 2nd interdigital space: 1.0cmx0.5cmx0.1cm. All wounds packed with 1in . Plain packing, covered with 4x4 gauze, ABD pads wrapped with kerlix and ACE.     The patient tolerated the procedure and anesthesia well. He was transferred to the recovery room with vital signs stable, vascular status intact, and capillary refill time < 3 seconds to the distal right foot. Following a period of post op monitoring, the patient will  remain inpatient.     1. Keep dressing dry and intact until reassessment tomorrow  2. Partial heel WB right   3. Continue pain control and medical management per Medicine team.  4. Ischemic appearance noted to right second toe. Patient at high risk for right second toe amputation at this time, further limb loss. Infection to right 2nd toe likely etiology of vascular compromise to right second toe. Angio per interventional cardiology performed earlier today revealing 3 vessel runoff to right foot.   5. Podiatry will follow    Tonya Valle DPM PGY-3    Estimated Blood Loss: <5ml           Specimens     Start     Ordered    06/07/18 1611  Specimen to Pathology - Surgery  Once      06/07/18 1613    06/02/18 1227  Specimen to Pathology - Surgery  Once      06/02/18 1228        Implants: * No implants in log *           Disposition: PACU - hemodynamically stable.           Condition: Good    Attestation:  I was present and scrubbed for the entire procedure.

## 2018-06-07 NOTE — PROGRESS NOTES
Patient arrived to pre-op, OR ready for patient now, stop sign completed, all hard stops are met, patient ready for OR. Pt is aaox4, no distress noted or reported, denies pain, vss. CBG stable (see labs), right foot dressed with white kerlex, toes exposed, right foot is edematous, ecchymotic, serosanguinous drainage noted to dressing and foot, blackened dark spot noted to bottom of foot.

## 2018-06-07 NOTE — SUBJECTIVE & OBJECTIVE
Interval History:  Febrile to 101.1 F overnight. No complaints this morning. Denies foot pain. No BM since last I/D 2 days ago.    Review of Systems   Constitutional: Negative for chills, fever and unexpected weight change.   HENT: Negative for hearing loss.    Eyes: Negative for visual disturbance.   Respiratory: Negative for shortness of breath.    Cardiovascular: Negative for chest pain and leg swelling.   Gastrointestinal: Negative for abdominal pain, constipation, diarrhea, nausea and vomiting.   Genitourinary: Negative for dysuria and hematuria.   Musculoskeletal: Negative for arthralgias.   Skin: Positive for color change, rash and wound.   Neurological: Negative for seizures and weakness.   Hematological: Negative for adenopathy. Does not bruise/bleed easily.     Objective:     Vital Signs (Most Recent):  Temp: 98.1 °F (36.7 °C) (06/07/18 1800)  Pulse: (!) 55 (06/07/18 1800)  Resp: 17 (06/07/18 1800)  BP: 134/84 (06/07/18 1800)  SpO2: 98 % (06/07/18 1800) Vital Signs (24h Range):  Temp:  [97.3 °F (36.3 °C)-101.1 °F (38.4 °C)] 98.1 °F (36.7 °C)  Pulse:  [55-75] 55  Resp:  [16-18] 17  SpO2:  [92 %-100 %] 98 %  BP: (100-147)/(58-88) 134/84     Weight: 122.5 kg (270 lb)  Body mass index is 35.62 kg/m².    Intake/Output Summary (Last 24 hours) at 06/07/18 1858  Last data filed at 06/07/18 1637   Gross per 24 hour   Intake             1370 ml   Output             1100 ml   Net              270 ml      Physical Exam   Constitutional: He is oriented to person, place, and time. He appears well-developed and well-nourished. No distress.   HENT:   Head: Normocephalic and atraumatic.   Eyes: Conjunctivae, EOM and lids are normal.   Neck: No tracheal deviation present. No thyromegaly present.   Cardiovascular: Normal rate, regular rhythm, normal heart sounds and intact distal pulses.    No murmur heard.  Pulmonary/Chest: Effort normal and breath sounds normal. No respiratory distress. He has no wheezes. He has no rales.    Abdominal: Soft. He exhibits no distension and no mass. There is no tenderness. No hernia.   Musculoskeletal: He exhibits tenderness. He exhibits no edema.   Right foot bandage, intact, clean, dry. Point tenderness on sole of foot at location of wound. R second toe with purple discoloration and decreased strength/ROM compared to other toes.   Neurological: He is alert and oriented to person, place, and time. No cranial nerve deficit. He exhibits normal muscle tone.   Skin: Skin is warm and dry. Rash noted. He is not diaphoretic. There is erythema.   RLE erythema up to mid shin, no change from marked border. Non tender, non edematous.   Psychiatric: He has a normal mood and affect.   Vitals reviewed.    Significant Labs: All pertinent labs within the past 24 hours have been reviewed.  CBC:   6/7/2018 03:39   WBC 10.51   RBC 4.00 (L)   Hemoglobin 11.7 (L)   Hematocrit 35.3 (L)   MCV 88   MCH 29.3   MCHC 33.1   RDW 12.0   Platelets 257   MPV 9.6   Gran% 68.3   Gran # (ANC) 7.2   Immature Granulocytes 2.1 (H)   Immature Grans (Abs) 0.22 (H)   Lymph% 14.5 (L)   Lymph # 1.5   Mono% 12.1   Mono # 1.3 (H)   Eosinophil% 2.6   Eos # 0.3   Basophil% 0.4   Baso # 0.04   nRBC 0     CMP:   6/7/2018 03:39   Sodium 135 (L)   Potassium 4.0   Chloride 99   CO2 27   Anion Gap 9   BUN, Bld 10   Creatinine 1.0   eGFR if non African American >60.0   eGFR if African American >60.0   Glucose 251 (H)   Calcium 8.3 (L)   Phosphorus 3.2   Magnesium 1.7   Alkaline Phosphatase 103   Total Protein 6.4   Albumin 2.0 (L)   Total Bilirubin 0.3   AST 33   ALT 26     DM panel:   6/6/2018 12:06 6/6/2018 16:50 6/6/2018 21:08 6/7/2018 10:06 6/7/2018 13:59 6/7/2018 17:10   POCT Glucose 238 (H) 260 (H) 195 (H) 182 (H) 187 (H) 181 (H)      6/6/2018 09:50 6/6/2018 09:50   Blood Culture, Routine No Growth to date No Growth to date     Significant Imaging: I have reviewed and interpreted all pertinent imaging results/findings within the past 24 hours.   No  new imaging.

## 2018-06-07 NOTE — SUBJECTIVE & OBJECTIVE
Interval Hx: S/p  right foot I&D DOS:6/2/18 . Reports pain improved well controlled with pain meds. Xrays reviewed noting residual foreign body in foot. Plan for right foot debridement  tomorrow      Scheduled Meds:   atorvastatin  80 mg Oral Daily    carvedilol  12.5 mg Oral BID    ceFAZolin (ANCEF) IVPB  2 g Intravenous Q8H    enoxaparin  40 mg Subcutaneous Daily    insulin aspart U-100  15 Units Subcutaneous TIDWM    insulin detemir U-100  45 Units Subcutaneous QHS    Lactobacillus rhamnosus GG  1 capsule Oral Daily    lisinopril  40 mg Oral Daily     Continuous Infusions:  PRN Meds:acetaminophen, dextrose 50%, dextrose 50%, glucagon (human recombinant), glucose, glucose, insulin aspart U-100, ondansetron, oxyCODONE, sodium chloride 0.9%, sodium chloride 0.9%    Review of patient's allergies indicates:   Allergen Reactions    Penicillins Anaphylaxis    Shellfish containing products      Other reaction(s): Unknown    Vancomycin Itching    Bactrim  [sulfamethoxazole-trimethoprim] Rash        Past Medical History:   Diagnosis Date    Allergy     CAD (coronary artery disease), native coronary artery 6/25/2013    Cancer     Diabetes mellitus     Diabetes mellitus, type 2     Disorder of kidney and ureter     Heart attack 04/2012    Hyperlipidemia     Hypertension     Muscular pain     post-op after colonoscopy     Past Surgical History:   Procedure Laterality Date    COLONOSCOPY N/A 2/17/2017    Procedure: COLONOSCOPY;  Surgeon: Simon Gomez MD;  Location: [facility] ([address] [place] [address]);  Service: Endoscopy;  Laterality: N/A;    CORONARY ANGIOPLASTY      INCISION AND DRAINAGE FOOT Right 6/5/2018    Procedure: INCISION AND DRAINAGE, FOOT;  Surgeon: Bridget Santana DPM;  Location: [address];  Service: Podiatry;  Laterality: Right;  Request mini C arm in room. request wound vac with medium black sponge    INCISION AND DRAINAGE OF ABSCESS Right 6/2/2018    Procedure: INCISION AND DRAINAGE,  ABSCESS;  Surgeon: Bridget Santana DPM;  Location: [facility] OR [address] [place] [address];  Service: General;  Laterality: Right;    TONSILLECTOMY         Family History     Problem Relation (Age of Onset)    Heart disease Mother, Brother        Social History Main Topics    Smoking status: Never Smoker    Smokeless tobacco: Never Used    Alcohol use Yes      Comment: rare x1 beer-     Drug use: No    Sexual activity: Not Currently     Review of Systems   Constitutional: Positive for activity change.   Respiratory: Negative for shortness of breath.    Cardiovascular: Negative for chest pain and leg swelling.   Gastrointestinal: Negative for nausea and vomiting.   Genitourinary: Negative.    Musculoskeletal: Positive for arthralgias.   Skin: Positive for wound.   Neurological: Negative for weakness and numbness.   Psychiatric/Behavioral: Negative.      Objective:     Vital Signs (Most Recent):  Temp: (!) 101.1 °F (38.4 °C) (06/06/18 1922)  Pulse: 63 (06/06/18 1922)  Resp: 18 (06/06/18 1922)  BP: 131/72 (06/06/18 1922)  SpO2: 95 % (06/06/18 1922) Vital Signs (24h Range):  Temp:  [98.5 °F (36.9 °C)-102.5 °F (39.2 °C)] 101.1 °F (38.4 °C)  Pulse:  [61-89] 63  Resp:  [16-20] 18  SpO2:  [91 %-98 %] 95 %  BP: (109-139)/(56-76) 131/72     Weight: 122.5 kg (270 lb)  Body mass index is 35.62 kg/m².    Foot Exam    General  Orientation: alert and oriented to person, place, and time       Right Foot/Ankle     Inspection and Palpation  Tenderness: metatarsals   Swelling: metatarsals   Skin Exam: ulcer;     Neurovascular  Dorsalis pedis: 1+  Posterior tibial: 1+      Left Foot/Ankle      Inspection and Palpation  Tenderness: none   Swelling: none   Skin Exam: erythema;     Neurovascular  Dorsalis pedis: 1+  Posterior tibial: 1+          6/6/18    Wound 1:Plantar right foot   Measurement: 5cmx0.2cmx0.5cm  Base: fibrous   Periwound skin: macerated tissue -   Drainage: serous  Erythema: continued erythema noted to right foot.   Probe: negatetive  probe to bone.     Left second toe with dusky appearance.     Wound 1: Right dorsal foot.   Measurement:2 incisions one at base of 2nd and 3rd toe. One at proximal met base.   Base: fibrous base   Periwound skin: erythema with maceration.   Drainage: serous, exam limited 2/2 to pain.   Erythema: moderate   Probe: none, no bone exposed              Gentian violet applied to right plantar foot.                           Laboratory:  CBC:     Recent Labs  Lab 06/06/18 0624   WBC 13.16*   RBC 4.10*   HGB 11.8*   HCT 36.0*      MCV 88   MCH 28.8   MCHC 32.8     CMP:     Recent Labs  Lab 06/06/18 0624   *   CALCIUM 8.0*   ALBUMIN 2.0*   PROT 6.4   *   K 3.9   CO2 26   CL 97   BUN 10   CREATININE 1.0   ALKPHOS 115   ALT 27   AST 33   BILITOT 0.4       Diagnostic Results:  Xray: There is DJD and spurs on the calcaneus.  There is a foreign body soft tissues below in between the 1st and 2nd metatarsals thought to be a shard of glass.  There may be soft tissue swelling.  No fracture dislocation bone destruction seen.    MRI: No MR evidence of osteomyelitis.    Diffuse subcutaneous and myofascial forefoot edema with multiple plantar skin defects, overall, concerning for cellulitis/myositis.  Associated radiopaque foreign body likely reflecting glass is more conspicuous on comparison foot radiograph.    Nonspecific dermal fluid collection along the plantar aspect of the 1st and 2nd metatarsophalangeal joints, possibly a blister.  An infected fluid collection cannot be excluded.  Suggest correlation with direct visualization and clinical findings.    Clinical Findings:  S/p  right foot I&D. Surgical site stable with serous drainage. Pain upon palpation right foot.

## 2018-06-07 NOTE — SUBJECTIVE & OBJECTIVE
Interval History:   TOMMY. T max 101.1. Scheduled to go for angiogram today then to the OR  for repeat washout and foreign body removal. He denies night sweats or chills. Foot pain well controlled. Right second toe still appears dusky.       Review of Systems   Constitutional: Negative for chills, diaphoresis and fever.   Respiratory: Negative for cough and shortness of breath.    Cardiovascular: Positive for leg swelling. Negative for chest pain.   Gastrointestinal: Negative for abdominal pain, diarrhea, nausea and vomiting.   Genitourinary: Negative for difficulty urinating, dysuria, frequency and hematuria.   Musculoskeletal: Positive for arthralgias (right foot) and joint swelling. Negative for back pain.   Skin: Positive for color change (redness RLE) and wound. Negative for rash.   Neurological: Negative for weakness and headaches.   Hematological: Negative for adenopathy.   Psychiatric/Behavioral: Negative for confusion. The patient is not nervous/anxious.    All other systems reviewed and are negative.    Objective:     Vital Signs (Most Recent):  Temp: 98.7 °F (37.1 °C) (06/07/18 0825)  Pulse: 63 (06/07/18 0825)  Resp: 18 (06/07/18 0825)  BP: (!) 145/77 (06/07/18 0825)  SpO2: (!) 94 % (06/07/18 0825) Vital Signs (24h Range):  Temp:  [98.5 °F (36.9 °C)-101.1 °F (38.4 °C)] 98.7 °F (37.1 °C)  Pulse:  [61-75] 63  Resp:  [18] 18  SpO2:  [92 %-96 %] 94 %  BP: (109-145)/(58-77) 145/77     Weight: 122.5 kg (270 lb)  Body mass index is 35.62 kg/m².    Estimated Creatinine Clearance: 117.1 mL/min (based on SCr of 1 mg/dL).    Physical Exam   Constitutional: He is oriented to person, place, and time. He appears well-developed and well-nourished. No distress.   HENT:   Head: Normocephalic and atraumatic.   Eyes: Conjunctivae are normal. No scleral icterus.   Cardiovascular: Normal rate, regular rhythm and intact distal pulses.    No murmur heard.  Pulmonary/Chest: Effort normal. No respiratory distress. He has no  wheezes.   Abdominal: Soft. He exhibits no distension. There is no tenderness. There is no guarding.   Musculoskeletal: He exhibits edema and tenderness (right foot).   Neurological: He is alert and oriented to person, place, and time.   Skin: Skin is warm and dry. He is not diaphoretic. There is erythema.   Right dorsal foot warmth, erythema and edema. Digits appear dusky and edematous. Right 4th and 5th digits pale. Cellulitis to RLE improved. TTP.    Psychiatric: He has a normal mood and affect. His behavior is normal. Thought content normal.       Significant Labs:   Blood Culture:     Recent Labs  Lab 06/01/18  1445 06/02/18  2111 06/02/18  2115 06/06/18  0950   LABBLOO No growth after 5 days. No Growth to date  No Growth to date  No Growth to date  No Growth to date  No Growth to date No Growth to date  No Growth to date  No Growth to date  No Growth to date  No Growth to date No Growth to date     CBC:     Recent Labs  Lab 06/06/18  0624 06/07/18  0339   WBC 13.16* 10.51   HGB 11.8* 11.7*   HCT 36.0* 35.3*    257     CMP:     Recent Labs  Lab 06/06/18  0624 06/07/18  0339   * 135*   K 3.9 4.0   CL 97 99   CO2 26 27   * 251*   BUN 10 10   CREATININE 1.0 1.0   CALCIUM 8.0* 8.3*   PROT 6.4 6.4   ALBUMIN 2.0* 2.0*   BILITOT 0.4 0.3   ALKPHOS 115 103   AST 33 33   ALT 27 26   ANIONGAP 9 9   EGFRNONAA >60.0 >60.0     Wound Culture:     Recent Labs  Lab 06/02/18  1218 06/02/18  1226   LABAERO STREPTOCOCCUS AGALACTIAE (GROUP B)ManyBeta-hemolytic streptococci are routinely susceptible to penicillins,cephalosporins and carbapenems.Susceptibility testing not routinely performed STREPTOCOCCUS AGALACTIAE (GROUP B)ManyBeta-hemolytic streptococci are routinely susceptible to penicillins,cephalosporins and carbapenems.Susceptibility testing not routinely performed     All pertinent labs within the past 24 hours have been reviewed.    Significant Imaging: I have reviewed all pertinent imaging  results/findings within the past 24 hours.

## 2018-06-07 NOTE — ASSESSMENT & PLAN NOTE
52-year-old male admitted with right foot infection after penetrating glass trauma that failed outpatient antibiotics alone. In the ED patient was noted to have a fever, WBC of 15K, , . MRI showed diffuse subcutaneous and myofascial forefoot edema, fluid collection with associated radiopaque foreign body. No osteomyelitis. He was taken to the OR 6/2 and again 6/5 for washout with podiatry. Copious amounts of purulent drainage noted with tracking to dorsal foot. Surgical cultures grew GBS. Foreign body was found at 1st interdigital space but unable to remove. Right foot discolored and digits appear dusky.      He is scheduled to go for angiogram today then to the OR  for repeat washout and foreign body removal. Currently on IV Cefazolin.  T max 101.1. No leukocytosis. Clinically stable/non septic appearing. Recurrent fevers likely due to lack of source control.        Plan  - Continue Cefazolin 2 g IV q 8 hours.   - Please obtain new surgical cultures during surgery. Discussed with podiatry.   - Will follow newly obtained surgical cultures to guide antibiotic therapy.   - If source control is achieved and there is no evidence of osteomyelitis, can likely discharge on oral antibiotics.  - Recommend Allergy follow up as an outpatient for PCN skin testing   - ID will follow.

## 2018-06-07 NOTE — PROGRESS NOTES
[facility]  Infectious Disease  Progress Note    Patient Name: Billy Rivera  MRN: [id number]  Admission Date: 6/1/2018  Length of Stay: 6 days  Attending Physician: Josiah Sawyer, *  Primary Care Provider: BRAD Baker MD    Isolation Status: No active isolations  Assessment/Plan:      Right foot infection       52-year-old male admitted with right foot infection after penetrating glass trauma that failed outpatient antibiotics alone. In the ED patient was noted to have a fever, WBC of 15K, , . MRI showed diffuse subcutaneous and myofascial forefoot edema, fluid collection with associated radiopaque foreign body. No osteomyelitis. He was taken to the OR 6/2 and again 6/5 for washout with podiatry. Copious amounts of purulent drainage noted with tracking to dorsal foot. Surgical cultures grew GBS. Foreign body was found at 1st interdigital space but unable to remove. Right foot discolored and digits appear dusky.      He is scheduled to go for angiogram today then to the OR  for repeat washout and foreign body removal.  Currently on IV Cefazolin.  T max 101.1. No leukocytosis. Clinically stable/non septic appearing. Recurrent fevers likely due to lack of source control.        Plan  - Continue Cefazolin 2 g IV q 8 hours.   - Please obtain new surgical cultures during surgery. Discussed with podiatry.   - Will follow newly obtained surgical cultures to guide antibiotic therapy.   - If source control is achieved and there is no evidence of osteomyelitis, can likely discharge on oral antibiotics.  - Recommend Allergy follow up as an outpatient for PCN skin testing   - ID will follow.             Please call for any questions. Thank you.  Cielo Almazan PA-C  Phone: [phone]  Pager: [phone]    Subjective:     Principal Problem:Diabetic ulcer of right midfoot    HPI: Mr. Rivera is a 52-year-old male admitted with right foot infection. Patient states that he stepped on a  piece of glass at work that penetrated through his shoe on May 19.  At that time he removed the glass, used antiseptic and antibacterial on his foot and felt that things were ok. He began having issues on May 26, when he noted that a large blister developed on his plantar surface near the first digit. No fevers, chills or sweats at home. On the 29th he was seen by his PCP and started on Cipro and Clindamycin. Patient notes that the clinda and Cipro gave him diarrhea, severe nausea and vomiting. Unfortunately his symptoms did not improve on antibiotics alone prompting him to come to the ED.     In the ED patient was noted to have a WBC of 15K, , , procalc 0.29. MRI showed diffuse subcutaneous and myofascial forefoot edema, fluid collection along the plantar aspect of the 1st and 2nd metatarsophalangeal joints with associated radiopaque foreign body likely reflecting glass.  No osteomyelitis. He was started on Dapto, Flagyl and Aztreonam given documented PCN allergy. He was taken to the OR yesterday for washout with podiatry. Copious amounts of purulent drainage noted from ulceration with tracking noted to dorsal foot at 1st interdigital space. Surgical cultures are growing GBS. He had an isolated fever post op. WBC is trending down. He is seen at bedside and feels well today. Pain controlled. Patient denies current fevers, chills, sweats, dysuria, rash, shortness of breath, chest pain, abdominal pain. Diarrhea and nausea resolved. Of note, discussed his PCN allergy and patient states that his reaction was as a child and he does not remember the episode. His mother told him he went into a coma after receiving IM PCN. He denies being told of any anaphylactic like reactions. He is unaware if he has ever been treated with a cephalosporin.  Interval History:   NAEON. T max 101.1. Scheduled to go for angiogram today then to the OR  for repeat washout and foreign body removal. He denies night sweats or chills.  Foot pain well controlled. Right second toe still appears dusky.       Review of Systems   Constitutional: Negative for chills, diaphoresis and fever.   Respiratory: Negative for cough and shortness of breath.    Cardiovascular: Positive for leg swelling. Negative for chest pain.   Gastrointestinal: Negative for abdominal pain, diarrhea, nausea and vomiting.   Genitourinary: Negative for difficulty urinating, dysuria, frequency and hematuria.   Musculoskeletal: Positive for arthralgias (right foot) and joint swelling. Negative for back pain.   Skin: Positive for color change (redness RLE) and wound. Negative for rash.   Neurological: Negative for weakness and headaches.   Hematological: Negative for adenopathy.   Psychiatric/Behavioral: Negative for confusion. The patient is not nervous/anxious.    All other systems reviewed and are negative.    Objective:     Vital Signs (Most Recent):  Temp: 98.7 °F (37.1 °C) (06/07/18 0825)  Pulse: 63 (06/07/18 0825)  Resp: 18 (06/07/18 0825)  BP: (!) 145/77 (06/07/18 0825)  SpO2: (!) 94 % (06/07/18 0825) Vital Signs (24h Range):  Temp:  [98.5 °F (36.9 °C)-101.1 °F (38.4 °C)] 98.7 °F (37.1 °C)  Pulse:  [61-75] 63  Resp:  [18] 18  SpO2:  [92 %-96 %] 94 %  BP: (109-145)/(58-77) 145/77     Weight: 122.5 kg (270 lb)  Body mass index is 35.62 kg/m².    Estimated Creatinine Clearance: 117.1 mL/min (based on SCr of 1 mg/dL).    Physical Exam   Constitutional: He is oriented to person, place, and time. He appears well-developed and well-nourished. No distress.   HENT:   Head: Normocephalic and atraumatic.   Eyes: Conjunctivae are normal. No scleral icterus.   Cardiovascular: Normal rate, regular rhythm and intact distal pulses.    No murmur heard.  Pulmonary/Chest: Effort normal. No respiratory distress. He has no wheezes.   Abdominal: Soft. He exhibits no distension. There is no tenderness. There is no guarding.   Musculoskeletal: He exhibits edema and tenderness (right foot).    Neurological: He is alert and oriented to person, place, and time.   Skin: Skin is warm and dry. He is not diaphoretic. There is erythema.   Right dorsal foot warmth, erythema and edema. Digits appear dusky and edematous. Right 4th and 5th digits pale. Cellulitis to RLE improved. TTP.    Psychiatric: He has a normal mood and affect. His behavior is normal. Thought content normal.       Significant Labs:   Blood Culture:     Recent Labs  Lab 06/01/18  1445 06/02/18  2111 06/02/18  2115 06/06/18  0950   LABBLOO No growth after 5 days. No Growth to date  No Growth to date  No Growth to date  No Growth to date  No Growth to date No Growth to date  No Growth to date  No Growth to date  No Growth to date  No Growth to date No Growth to date     CBC:     Recent Labs  Lab 06/06/18 0624 06/07/18  0339   WBC 13.16* 10.51   HGB 11.8* 11.7*   HCT 36.0* 35.3*    257     CMP:     Recent Labs  Lab 06/06/18  0624 06/07/18  0339   * 135*   K 3.9 4.0   CL 97 99   CO2 26 27   * 251*   BUN 10 10   CREATININE 1.0 1.0   CALCIUM 8.0* 8.3*   PROT 6.4 6.4   ALBUMIN 2.0* 2.0*   BILITOT 0.4 0.3   ALKPHOS 115 103   AST 33 33   ALT 27 26   ANIONGAP 9 9   EGFRNONAA >60.0 >60.0     Wound Culture:     Recent Labs  Lab 06/02/18  1218 06/02/18  1226   LABAERO STREPTOCOCCUS AGALACTIAE (GROUP B)ManyBeta-hemolytic streptococci are routinely susceptible to penicillins,cephalosporins and carbapenems.Susceptibility testing not routinely performed STREPTOCOCCUS AGALACTIAE (GROUP B)ManyBeta-hemolytic streptococci are routinely susceptible to penicillins,cephalosporins and carbapenems.Susceptibility testing not routinely performed     All pertinent labs within the past 24 hours have been reviewed.    Significant Imaging: I have reviewed all pertinent imaging results/findings within the past 24 hours.

## 2018-06-07 NOTE — PROGRESS NOTES
[facility]  Podiatry  Progress Note    Patient Name: Billy Sheffield Miguel  MRN: [id number]  Admission Date: 6/1/2018  Hospital Length of Stay: 5 days  Attending Physician: Josiah Sawyer, *  Primary Care Provider: BRAD Baker MD   Interval Hx: S/p  right foot I&D DOS:6/2/18 . Reports pain improved well controlled with pain meds. Xrays reviewed noting residual foreign body in foot. Plan for right foot debridement  tomorrow      Scheduled Meds:   atorvastatin  80 mg Oral Daily    carvedilol  12.5 mg Oral BID    ceFAZolin (ANCEF) IVPB  2 g Intravenous Q8H    enoxaparin  40 mg Subcutaneous Daily    insulin aspart U-100  15 Units Subcutaneous TIDWM    insulin detemir U-100  45 Units Subcutaneous QHS    Lactobacillus rhamnosus GG  1 capsule Oral Daily    lisinopril  40 mg Oral Daily     Continuous Infusions:  PRN Meds:acetaminophen, dextrose 50%, dextrose 50%, glucagon (human recombinant), glucose, glucose, insulin aspart U-100, ondansetron, oxyCODONE, sodium chloride 0.9%, sodium chloride 0.9%    Review of patient's allergies indicates:   Allergen Reactions    Penicillins Anaphylaxis    Shellfish containing products      Other reaction(s): Unknown    Vancomycin Itching    Bactrim  [sulfamethoxazole-trimethoprim] Rash        Past Medical History:   Diagnosis Date    Allergy     CAD (coronary artery disease), native coronary artery 6/25/2013    Cancer     Diabetes mellitus     Diabetes mellitus, type 2     Disorder of kidney and ureter     Heart attack 04/2012    Hyperlipidemia     Hypertension     Muscular pain     post-op after colonoscopy     Past Surgical History:   Procedure Laterality Date    COLONOSCOPY N/A 2/17/2017    Procedure: COLONOSCOPY;  Surgeon: Simon Gomez MD;  Location: [address];  Service: Endoscopy;  Laterality: N/A;    CORONARY ANGIOPLASTY      INCISION AND DRAINAGE FOOT Right 6/5/2018    Procedure: INCISION AND DRAINAGE, FOOT;  Surgeon:  Bridget Santana DPM;  Location: [facility] OR [facility];  Service: Podiatry;  Laterality: Right;  Request mini C arm in room. request wound vac with medium black sponge    INCISION AND DRAINAGE OF ABSCESS Right 6/2/2018    Procedure: INCISION AND DRAINAGE, ABSCESS;  Surgeon: Bridget Santana DPM;  Location: [facility] OR [facility];  Service: General;  Laterality: Right;    TONSILLECTOMY         Family History     Problem Relation (Age of Onset)    Heart disease Mother, Brother        Social History Main Topics    Smoking status: Never Smoker    Smokeless tobacco: Never Used    Alcohol use Yes      Comment: rare x1 beer-     Drug use: No    Sexual activity: Not Currently     Review of Systems   Constitutional: Positive for activity change.   Respiratory: Negative for shortness of breath.    Cardiovascular: Negative for chest pain and leg swelling.   Gastrointestinal: Negative for nausea and vomiting.   Genitourinary: Negative.    Musculoskeletal: Positive for arthralgias.   Skin: Positive for wound.   Neurological: Negative for weakness and numbness.   Psychiatric/Behavioral: Negative.      Objective:     Vital Signs (Most Recent):  Temp: (!) 101.1 °F (38.4 °C) (06/06/18 1922)  Pulse: 63 (06/06/18 1922)  Resp: 18 (06/06/18 1922)  BP: 131/72 (06/06/18 1922)  SpO2: 95 % (06/06/18 1922) Vital Signs (24h Range):  Temp:  [98.5 °F (36.9 °C)-102.5 °F (39.2 °C)] 101.1 °F (38.4 °C)  Pulse:  [61-89] 63  Resp:  [16-20] 18  SpO2:  [91 %-98 %] 95 %  BP: (109-139)/(56-76) 131/72     Weight: 122.5 kg (270 lb)  Body mass index is 35.62 kg/m².    Foot Exam    General  Orientation: alert and oriented to person, place, and time       Right Foot/Ankle     Inspection and Palpation  Tenderness: metatarsals   Swelling: metatarsals   Skin Exam: ulcer;     Neurovascular  Dorsalis pedis: 1+  Posterior tibial: 1+      Left Foot/Ankle      Inspection and Palpation  Tenderness: none   Swelling: none   Skin Exam: erythema;     Neurovascular  Dorsalis  pedis: 1+  Posterior tibial: 1+          6/6/18    Wound 1:Plantar right foot   Measurement: 5cmx0.2cmx0.5cm  Base: fibrous   Periwound skin: macerated tissue -   Drainage: serous  Erythema: continued erythema noted to right foot.   Probe: negatetive probe to bone.     Left second toe with dusky appearance.     Wound 1: Right dorsal foot.   Measurement:2 incisions one at base of 2nd and 3rd toe. One at proximal met base.   Base: fibrous base   Periwound skin: erythema with maceration.   Drainage: serous, exam limited 2/2 to pain.   Erythema: moderate   Probe: none, no bone exposed              Gentian violet applied to right plantar foot.                           Laboratory:  CBC:     Recent Labs  Lab 06/06/18  0624   WBC 13.16*   RBC 4.10*   HGB 11.8*   HCT 36.0*      MCV 88   MCH 28.8   MCHC 32.8     CMP:     Recent Labs  Lab 06/06/18  0624   *   CALCIUM 8.0*   ALBUMIN 2.0*   PROT 6.4   *   K 3.9   CO2 26   CL 97   BUN 10   CREATININE 1.0   ALKPHOS 115   ALT 27   AST 33   BILITOT 0.4       Diagnostic Results:  Xray: There is DJD and spurs on the calcaneus.  There is a foreign body soft tissues below in between the 1st and 2nd metatarsals thought to be a shard of glass.  There may be soft tissue swelling.  No fracture dislocation bone destruction seen.    MRI: No MR evidence of osteomyelitis.    Diffuse subcutaneous and myofascial forefoot edema with multiple plantar skin defects, overall, concerning for cellulitis/myositis.  Associated radiopaque foreign body likely reflecting glass is more conspicuous on comparison foot radiograph.    Nonspecific dermal fluid collection along the plantar aspect of the 1st and 2nd metatarsophalangeal joints, possibly a blister.  An infected fluid collection cannot be excluded.  Suggest correlation with direct visualization and clinical findings.    Clinical Findings:  S/p  right foot I&D. Surgical site stable with serous drainage. Pain upon palpation right  foot.     Assessment/Plan:     * Diabetic ulcer of right midfoot    S/p right foot I&D, surgical site with serous drainage.   Left second toe with dusky appearance will continue to monitor,  interventional cardiology consulted.   Arterial US ordered on 6/2/18 with  No hemodynamically significant stenosis demonstrated in the right or left lower extremity arterial system.  Repeat xray right foot ordered, reviewed noting residual foreign body right foot.   Plan for right foot debridement tomorrow 6/6/18 @2:30p  NPO at midnight  Long discussion with patient regarding the procedure in detail.  Informed consent discussed in detail  including all alternatives, risks and complications not limited to consent form.  Patient understands all risks, potential complications, and alternatives, including, but not limited to those listed on the consent form. All questions were answered. No guarantees given or implied as to outcome. Informed verbal and written consent was obtained. Consent forms read, signed, witnessed.       Abx per ID appreciate assistance.   Podiatry will follow      Weightbearing Status: Partial heel WB right   Offloading Device: DARCO shoe     Tonya Valle DPM PGY-3  Pager: [phone]              Right foot infection    Per above         Type 2 diabetes mellitus 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, with long-term current use of insulin    Per primary             Tonya Valle MD  Podiatry  [facility]

## 2018-06-07 NOTE — TRANSFER OF CARE
"Anesthesia Transfer of Care Note    Patient: Billy Rivera    Procedure(s) Performed: Procedure(s) (LRB):  INCISION AND DRAINAGE, FOOT (Right)  REMOVAL, FOREIGN BODY, FOOT (Right)    Patient location: PACU    Anesthesia Type: general    Transport from OR: Transported from OR on 6-10 L/min O2 by face mask with adequate spontaneous ventilation    Post pain: adequate analgesia    Post assessment: no apparent anesthetic complications    Post vital signs: stable    Level of consciousness: awake, alert and oriented    Nausea/Vomiting: no nausea/vomiting    Complications: none    Transfer of care protocol was followed      Last vitals:   Visit Vitals  /73 (BP Location: Right arm, Patient Position: Lying)   Pulse (!) 56   Temp 37.2 °C (99 °F) (Oral)   Resp 16   Ht 6' 1" (1.854 m)   Wt 122.5 kg (270 lb)   SpO2 98%   BMI 35.62 kg/m²     "

## 2018-06-07 NOTE — ASSESSMENT & PLAN NOTE
Normal VASILIY on US last year-mild disease with exercise VASILIY  US normal this admission   Will proceed with B/L Angiogram    - Keep NPO   -The risks, benefits and alternatives of the procedure were explained to the patient.   -The risks of  angiography include but are not limited to: bleeding, infection, heart rhythm abnormalities, allergic reactions, kidney injury and potential need for dialysis, stroke and death.   -The risks of moderate sedation include hypotension, respiratory depression, arrhythmias, bronchospasm, and death.   - Informed consent was obtained and the  patient is agreeable to proceed with the procedure.

## 2018-06-07 NOTE — PROCEDURES
"            Interventional Cardiology   Post Cath Note      Referring Physician: Josiah Sawyer, *  Procedure: Lower Extremities angiogram   Indication: Right foot ulcer    SUBJECTIVE:   Patient tolerated procedure well with no complications. He has 3 vessels runoff bilaterally please see full report.   Cath Results:   Access:  R CFA    See full cath report for further details    Intervention:     Closure device used: Mynx  Patient tolerated the procedure well, no complications    Post Cath Exam:   BP (!) 145/77   Pulse 63   Temp 98.7 °F (37.1 °C)   Resp 18   Ht 6' 1" (1.854 m)   Wt 122.5 kg (270 lb)   SpO2 (!) 94%   BMI 35.62 kg/m²     No unusual pain, hematoma, thrill or bruit at vascular access site.  Distal pulse present without signs of ischemia.    Assessment / Plan:     - 3 vessels runoff bilaterally please see full report.   - Routine post cath protocol.  - IVFs for MARIAM prevention.  - Further care by the primary team.    Attending addendum to follow     Luz Smallwood MD  Cardiology Fellow  Pager: [phone]          "

## 2018-06-07 NOTE — ASSESSMENT & PLAN NOTE
S/p right foot I&D, surgical site with serous drainage.   Left second toe with dusky appearance will continue to monitor, consider  interventional cardiology consulted.   Arterial US ordered on 6/2/18 with  No hemodynamically significant stenosis demonstrated in the right or left lower extremity arterial system.  Repeat xray right foot ordered, reviewed noting residual foreign body right foot.   Plan for right foot debridement tomorrow 6/6/18 @2:30p  NPO at midnight  Long discussion with patient regarding the procedure in detail.  Informed consent discussed in detail  including all alternatives, risks and complications not limited to consent form.  Patient understands all risks, potential complications, and alternatives, including, but not limited to those listed on the consent form. All questions were answered. No guarantees given or implied as to outcome. Informed verbal and written consent was obtained. Consent forms read, signed, witnessed.       Abx per ID appreciate assistance.   Podiatry will follow      Weightbearing Status: Partial heel WB right   Offloading Device: DARCO shoe     Tonya Valle DPM PGY-3  Pager: [phone]

## 2018-06-07 NOTE — NURSING TRANSFER
Nursing Transfer Note      6/7/2018     Transfer To: Room 952 A    Transfer via stretcher    Transported by SEUN De Oliveira    Medicines sent: None    Chart send with patient: Yes    Notified: spouse    Patient reassessed at: 6/7/18 18:30 (date, time)    Upon arrival to floor: patient oriented to room, call bell in reach and bed in lowest position

## 2018-06-07 NOTE — PHARMACY MED REC
Admission Medication Reconciliation - Pharmacy Consult Note    The home medication history was taken by Vanita Parekh, Pharmacy Tech.     Potentially problematic discrepancies with current MAR    Potential issues to be addressed PRIOR TO DISCHARGE  o Patient endorses only taking Coreg once daily, would educate on BID dosing  o Would resume home ASA post-op    Please address this information as you see fit.  Feel free to contact us if you have any questions or require assistance.    Isrrael Hayes, PharmD  [id number]                .    .

## 2018-06-08 PROBLEM — S90.851A FOREIGN BODY IN RIGHT FOOT: Status: ACTIVE | Noted: 2018-06-08

## 2018-06-08 LAB
ALBUMIN SERPL BCP-MCNC: 2 G/DL
ALP SERPL-CCNC: 103 U/L
ALT SERPL W/O P-5'-P-CCNC: 27 U/L
ANION GAP SERPL CALC-SCNC: 7 MMOL/L
AST SERPL-CCNC: 33 U/L
BASOPHILS # BLD AUTO: 0.03 K/UL
BASOPHILS NFR BLD: 0.3 %
BILIRUB SERPL-MCNC: 0.3 MG/DL
BUN SERPL-MCNC: 11 MG/DL
CALCIUM SERPL-MCNC: 8.3 MG/DL
CHLORIDE SERPL-SCNC: 99 MMOL/L
CO2 SERPL-SCNC: 25 MMOL/L
CREAT SERPL-MCNC: 0.9 MG/DL
DIFFERENTIAL METHOD: ABNORMAL
EOSINOPHIL # BLD AUTO: 0.2 K/UL
EOSINOPHIL NFR BLD: 2.3 %
ERYTHROCYTE [DISTWIDTH] IN BLOOD BY AUTOMATED COUNT: 12.2 %
EST. GFR  (AFRICAN AMERICAN): >60 ML/MIN/1.73 M^2
EST. GFR  (NON AFRICAN AMERICAN): >60 ML/MIN/1.73 M^2
GLUCOSE SERPL-MCNC: 255 MG/DL
HCT VFR BLD AUTO: 34.1 %
HGB BLD-MCNC: 11.3 G/DL
IMM GRANULOCYTES # BLD AUTO: 0.18 K/UL
IMM GRANULOCYTES NFR BLD AUTO: 1.7 %
LYMPHOCYTES # BLD AUTO: 1.2 K/UL
LYMPHOCYTES NFR BLD: 11.9 %
MAGNESIUM SERPL-MCNC: 1.7 MG/DL
MCH RBC QN AUTO: 29.3 PG
MCHC RBC AUTO-ENTMCNC: 33.1 G/DL
MCV RBC AUTO: 88 FL
MONOCYTES # BLD AUTO: 1.1 K/UL
MONOCYTES NFR BLD: 10.3 %
NEUTROPHILS # BLD AUTO: 7.6 K/UL
NEUTROPHILS NFR BLD: 73.5 %
NRBC BLD-RTO: 0 /100 WBC
PHOSPHATE SERPL-MCNC: 3.3 MG/DL
PLATELET # BLD AUTO: 272 K/UL
PMV BLD AUTO: 9.7 FL
POCT GLUCOSE: 114 MG/DL (ref 70–110)
POCT GLUCOSE: 175 MG/DL (ref 70–110)
POCT GLUCOSE: 176 MG/DL (ref 70–110)
POCT GLUCOSE: 217 MG/DL (ref 70–110)
POCT GLUCOSE: 256 MG/DL (ref 70–110)
POTASSIUM SERPL-SCNC: 4.7 MMOL/L
PROT SERPL-MCNC: 6.4 G/DL
RBC # BLD AUTO: 3.86 M/UL
SODIUM SERPL-SCNC: 131 MMOL/L
WBC # BLD AUTO: 10.37 K/UL

## 2018-06-08 PROCEDURE — 87070 CULTURE OTHR SPECIMN AEROBIC: CPT

## 2018-06-08 PROCEDURE — 87075 CULTR BACTERIA EXCEPT BLOOD: CPT

## 2018-06-08 PROCEDURE — 25000003 PHARM REV CODE 250: Performed by: STUDENT IN AN ORGANIZED HEALTH CARE EDUCATION/TRAINING PROGRAM

## 2018-06-08 PROCEDURE — 85025 COMPLETE CBC W/AUTO DIFF WBC: CPT

## 2018-06-08 PROCEDURE — 87147 CULTURE TYPE IMMUNOLOGIC: CPT

## 2018-06-08 PROCEDURE — 99233 SBSQ HOSP IP/OBS HIGH 50: CPT | Mod: ,,, | Performed by: INTERNAL MEDICINE

## 2018-06-08 PROCEDURE — 97161 PT EVAL LOW COMPLEX 20 MIN: CPT

## 2018-06-08 PROCEDURE — 11000001 HC ACUTE MED/SURG PRIVATE ROOM

## 2018-06-08 PROCEDURE — 83735 ASSAY OF MAGNESIUM: CPT

## 2018-06-08 PROCEDURE — 63600175 PHARM REV CODE 636 W HCPCS: Performed by: STUDENT IN AN ORGANIZED HEALTH CARE EDUCATION/TRAINING PROGRAM

## 2018-06-08 PROCEDURE — 63600175 PHARM REV CODE 636 W HCPCS: Performed by: PHYSICIAN ASSISTANT

## 2018-06-08 PROCEDURE — 36415 COLL VENOUS BLD VENIPUNCTURE: CPT

## 2018-06-08 PROCEDURE — 97165 OT EVAL LOW COMPLEX 30 MIN: CPT

## 2018-06-08 PROCEDURE — 99233 SBSQ HOSP IP/OBS HIGH 50: CPT | Mod: ,,, | Performed by: HOSPITALIST

## 2018-06-08 PROCEDURE — 84100 ASSAY OF PHOSPHORUS: CPT

## 2018-06-08 PROCEDURE — 80053 COMPREHEN METABOLIC PANEL: CPT

## 2018-06-08 RX ORDER — INSULIN ASPART 100 [IU]/ML
20 INJECTION, SOLUTION INTRAVENOUS; SUBCUTANEOUS
Status: DISCONTINUED | OUTPATIENT
Start: 2018-06-08 | End: 2018-06-10

## 2018-06-08 RX ADMIN — INSULIN ASPART 2 UNITS: 100 INJECTION, SOLUTION INTRAVENOUS; SUBCUTANEOUS at 06:06

## 2018-06-08 RX ADMIN — CARVEDILOL 12.5 MG: 12.5 TABLET, FILM COATED ORAL at 09:06

## 2018-06-08 RX ADMIN — ATORVASTATIN CALCIUM 80 MG: 20 TABLET, FILM COATED ORAL at 09:06

## 2018-06-08 RX ADMIN — INSULIN ASPART 4 UNITS: 100 INJECTION, SOLUTION INTRAVENOUS; SUBCUTANEOUS at 09:06

## 2018-06-08 RX ADMIN — INSULIN ASPART 17 UNITS: 100 INJECTION, SOLUTION INTRAVENOUS; SUBCUTANEOUS at 01:06

## 2018-06-08 RX ADMIN — LISINOPRIL 40 MG: 20 TABLET ORAL at 09:06

## 2018-06-08 RX ADMIN — INSULIN ASPART 20 UNITS: 100 INJECTION, SOLUTION INTRAVENOUS; SUBCUTANEOUS at 06:06

## 2018-06-08 RX ADMIN — CEFAZOLIN 2 G: 1 INJECTION, POWDER, FOR SOLUTION INTRAMUSCULAR; INTRAVENOUS at 09:06

## 2018-06-08 RX ADMIN — Medication 1 CAPSULE: at 09:06

## 2018-06-08 RX ADMIN — OXYCODONE HYDROCHLORIDE 5 MG: 5 TABLET ORAL at 09:06

## 2018-06-08 RX ADMIN — CEFAZOLIN 2 G: 1 INJECTION, POWDER, FOR SOLUTION INTRAMUSCULAR; INTRAVENOUS at 05:06

## 2018-06-08 RX ADMIN — ACETAMINOPHEN 650 MG: 325 TABLET ORAL at 01:06

## 2018-06-08 RX ADMIN — INSULIN DETEMIR 55 UNITS: 100 INJECTION, SOLUTION SUBCUTANEOUS at 09:06

## 2018-06-08 RX ADMIN — OXYCODONE HYDROCHLORIDE 5 MG: 5 TABLET ORAL at 04:06

## 2018-06-08 RX ADMIN — CEFAZOLIN 2 G: 1 INJECTION, POWDER, FOR SOLUTION INTRAMUSCULAR; INTRAVENOUS at 01:06

## 2018-06-08 RX ADMIN — INSULIN ASPART 17 UNITS: 100 INJECTION, SOLUTION INTRAVENOUS; SUBCUTANEOUS at 09:06

## 2018-06-08 RX ADMIN — PSYLLIUM HUSK 1 PACKET: 3.4 POWDER ORAL at 09:06

## 2018-06-08 RX ADMIN — ENOXAPARIN SODIUM 40 MG: 100 INJECTION SUBCUTANEOUS at 05:06

## 2018-06-08 NOTE — ASSESSMENT & PLAN NOTE
Cellulitis  Foreign object (glass) in foot  - S/p OR debridement by podiatry on day 2. Aerobic wound culture on 6/2/18 grew strep agalactiae. Anaerobic culture pending. No signs of osteomyelitis. Blood culture NGTD. WBC persistently 13. Repeat XR foot showed residual piece of glass in wound. Developed recurrent fevers with temp up to 102 F. Unsuccessful attempts to retrieve remaining piece of glass in foot in OR on day 5. Continues to have fever, consistent with lack of source control. Cellulitis of R leg improving.  - Continue cefazolin 2 g IV q8h, per ID recs (started on day 3, s/p aztreonam, flagyl, daptomycin initially).  - Repeat I/D today to re-attempt surgical removal of remaining piece of glass in foot, per podiatry.  - Plan to discharge home on PO cephalosporin.

## 2018-06-08 NOTE — SUBJECTIVE & OBJECTIVE
Interval History:   NAEON. T max 1001. No leukocytosis. Tolerated surgery well yesterday. Large piece of glass was removed. Still small piece remaining.  He denies night sweats or chills. Foot pain well controlled. Right second toe still appears dusky.       Review of Systems   Constitutional: Negative for chills, diaphoresis and fever.   Respiratory: Negative for cough and shortness of breath.    Cardiovascular: Positive for leg swelling. Negative for chest pain.   Gastrointestinal: Negative for abdominal pain, diarrhea, nausea and vomiting.   Genitourinary: Negative for difficulty urinating, dysuria, frequency and hematuria.   Musculoskeletal: Positive for arthralgias (right foot) and joint swelling. Negative for back pain.   Skin: Positive for color change (redness RLE) and wound. Negative for rash.   Neurological: Negative for weakness and headaches.   Hematological: Negative for adenopathy.   Psychiatric/Behavioral: Negative for confusion. The patient is not nervous/anxious.    All other systems reviewed and are negative.    Objective:     Vital Signs (Most Recent):  Temp: 99.1 °F (37.3 °C) (06/08/18 1536)  Pulse: (!) 59 (06/08/18 1536)  Resp: 18 (06/08/18 1536)  BP: 107/71 (06/08/18 1536)  SpO2: 96 % (06/08/18 1536) Vital Signs (24h Range):  Temp:  [97.3 °F (36.3 °C)-100.1 °F (37.8 °C)] 99.1 °F (37.3 °C)  Pulse:  [55-77] 59  Resp:  [16-20] 18  SpO2:  [93 %-100 %] 96 %  BP: (100-135)/(64-84) 107/71     Weight: 122.5 kg (270 lb)  Body mass index is 35.62 kg/m².    Estimated Creatinine Clearance: 130.1 mL/min (based on SCr of 0.9 mg/dL).    Physical Exam   Constitutional: He is oriented to person, place, and time. He appears well-developed and well-nourished. No distress.   HENT:   Head: Normocephalic and atraumatic.   Eyes: Conjunctivae are normal. No scleral icterus.   Cardiovascular: Normal rate, regular rhythm and intact distal pulses.    No murmur heard.  Pulmonary/Chest: Effort normal. No respiratory  distress. He has no wheezes.   Abdominal: Soft. He exhibits no distension. There is no tenderness. There is no guarding.   Musculoskeletal: He exhibits edema and tenderness (right foot).   Neurological: He is alert and oriented to person, place, and time.   Skin: Skin is warm and dry. He is not diaphoretic. There is erythema.   Right dorsal foot warmth, erythema and edema. Digits appear dusky and edematous. Right 4th and 5th digits pale. Cellulitis to RLE improved. TTP.    Psychiatric: He has a normal mood and affect. His behavior is normal. Thought content normal.       Significant Labs:   Blood Culture:     Recent Labs  Lab 06/01/18  1445 06/02/18  2111 06/02/18  2115 06/06/18  0950   LABBLOO No growth after 5 days. No growth after 5 days. No growth after 5 days. No Growth to date  No Growth to date  No Growth to date     CBC:     Recent Labs  Lab 06/07/18  0339 06/08/18  0521   WBC 10.51 10.37   HGB 11.7* 11.3*   HCT 35.3* 34.1*    272     CMP:     Recent Labs  Lab 06/07/18  0339 06/08/18  0521   * 131*   K 4.0 4.7   CL 99 99   CO2 27 25   * 255*   BUN 10 11   CREATININE 1.0 0.9   CALCIUM 8.3* 8.3*   PROT 6.4 6.4   ALBUMIN 2.0* 2.0*   BILITOT 0.3 0.3   ALKPHOS 103 103   AST 33 33   ALT 26 27   ANIONGAP 9 7*   EGFRNONAA >60.0 >60.0     Wound Culture:     Recent Labs  Lab 06/02/18  1218 06/02/18  1226   LABAERO STREPTOCOCCUS AGALACTIAE (GROUP B)ManyBeta-hemolytic streptococci are routinely susceptible to penicillins,cephalosporins and carbapenems.Susceptibility testing not routinely performed STREPTOCOCCUS AGALACTIAE (GROUP B)ManyBeta-hemolytic streptococci are routinely susceptible to penicillins,cephalosporins and carbapenems.Susceptibility testing not routinely performed     All pertinent labs within the past 24 hours have been reviewed.    Significant Imaging: I have reviewed all pertinent imaging results/findings within the past 24 hours.

## 2018-06-08 NOTE — ASSESSMENT & PLAN NOTE
- WBC persistently ~13 throughout course. Likely due to persistent wound infection, given foreign object remains embedded in foot. Ddx includes R leg cellulitis.  - Improved to 10 today.  - Monitor.

## 2018-06-08 NOTE — PLAN OF CARE
Problem: Occupational Therapy Goal  Goal: Occupational Therapy Goal  Outcome: Outcome(s) achieved Date Met: 06/08/18  Evaluation completed.  Pt at baseline functional performance .  DC OT  Liana Llanes, OT  6/8/2018

## 2018-06-08 NOTE — ASSESSMENT & PLAN NOTE
- On lisinopril 40 mg daily and carvedilol 12.5 mg BID at home.  - SBP currently low 100s-140s, at goal.  - Continue home ACE-I.  - Continue home BB.  - Monitor.

## 2018-06-08 NOTE — ASSESSMENT & PLAN NOTE
- Uncontrolled outpatient with hgb A1C of 12, 's, and anion gap 14 on admission (previously 9.7 in 2/2018). On insulin glargine 55 units, aspart 20 units TIDWM, metformin, trulicity at home.  - BG currently 190s to 260s on 80% home insulin regimen, required 11 units SSI yesterday.  - Increase insulin detemir to 50 units daily.  - Increase insulin aspart to 17 units TIDWM.  - Continue SSI.  - POCT glucose AC & HS.  - Holding home metformin and trulicity while in house.

## 2018-06-08 NOTE — ASSESSMENT & PLAN NOTE
S/p right foot I&D x3. Large piece of glass removed from foot. Residual piece of glass noted negligible in size <1mm.

## 2018-06-08 NOTE — ASSESSMENT & PLAN NOTE
- Uncontrolled outpatient with hgb A1C of 12, 's, and anion gap 14 on admission (previously 9.7 in 2/2018). On insulin glargine 55 units, aspart 20 units TIDWM, metformin, trulicity at home.  - Increase insulin detemir to 55 units daily.  - Increase insulin aspart to 20 units TIDWM.  - Continue SSI.  - POCT glucose AC & HS.  - Holding home metformin and trulicity while in house.

## 2018-06-08 NOTE — SUBJECTIVE & OBJECTIVE
Interval History: No acute overnight events, afebrile. Patient received I&D by podiatry yesterday. Piece of glass able to be removed, small tiny piece of residual glass seen on x-ray by radiology. C/w IV antibiotics.    Review of Systems  Objective:     Vital Signs (Most Recent):  Temp: 99.4 °F (37.4 °C) (06/08/18 1119)  Pulse: 63 (06/08/18 1119)  Resp: 18 (06/08/18 1119)  BP: 119/70 (06/08/18 1119)  SpO2: (!) 93 % (06/08/18 1119) Vital Signs (24h Range):  Temp:  [97.3 °F (36.3 °C)-100.1 °F (37.8 °C)] 99.4 °F (37.4 °C)  Pulse:  [55-77] 63  Resp:  [16-20] 18  SpO2:  [93 %-100 %] 93 %  BP: (100-135)/(64-84) 119/70     Weight: 122.5 kg (270 lb)  Body mass index is 35.62 kg/m².    Intake/Output Summary (Last 24 hours) at 06/08/18 1509  Last data filed at 06/07/18 2022   Gross per 24 hour   Intake              400 ml   Output              560 ml   Net             -160 ml      Physical Exam   Constitutional: He is oriented to person, place, and time. He appears well-developed and well-nourished. No distress.   HENT:   Head: Normocephalic and atraumatic.   Eyes: Conjunctivae, EOM and lids are normal.   Neck: No tracheal deviation present. No thyromegaly present.   Cardiovascular: Normal rate, regular rhythm, normal heart sounds and intact distal pulses.    No murmur heard.  Pulmonary/Chest: Effort normal and breath sounds normal. No respiratory distress. He has no wheezes. He has no rales.   Abdominal: Soft. He exhibits no distension and no mass. There is no tenderness. No hernia.   Musculoskeletal: He exhibits tenderness. He exhibits no edema.   Right foot bandage, intact, clean, dry. Point tenderness on sole of foot at location of wound. R second toe with purple discoloration and decreased strength/ROM compared to other toes.   Neurological: He is alert and oriented to person, place, and time. No cranial nerve deficit. He exhibits normal muscle tone.   Skin: Skin is warm and dry. Rash noted. He is not diaphoretic. There  is erythema.   RLE erythema up to mid shin, no change from marked border. Non tender, non edematous.   Psychiatric: He has a normal mood and affect.   Vitals reviewed.      Significant Labs:   BMP:   Recent Labs  Lab 06/08/18  0521   *   *   K 4.7   CL 99   CO2 25   BUN 11   CREATININE 0.9   CALCIUM 8.3*   MG 1.7     CBC:   Recent Labs  Lab 06/07/18  0339 06/08/18  0521   WBC 10.51 10.37   HGB 11.7* 11.3*   HCT 35.3* 34.1*    272     All pertinent labs within the past 24 hours have been reviewed.    Significant Imaging: I have reviewed all pertinent imaging results/findings within the past 24 hours.

## 2018-06-08 NOTE — PLAN OF CARE
Problem: Pain, Acute (Adult)  Goal: Identify Related Risk Factors and Signs and Symptoms  Related risk factors and signs and symptoms are identified upon initiation of Human Response Clinical Practice Guideline (CPG)  Outcome: Ongoing (interventions implemented as appropriate)   06/08/18 0601   Pain, Acute   Related Risk Factors (Acute Pain) disease process;infection;patient perception;persistent pain;trauma   Signs and Symptoms (Acute Pain) guarding/abnormal posturing/positioning;verbalization of pain descriptors     Goal: Acceptable Pain Control/Comfort Level  Patient will demonstrate the desired outcomes by discharge/transition of care.  Outcome: Ongoing (interventions implemented as appropriate)   06/08/18 0601   Pain, Acute (Adult)   Acceptable Pain Control/Comfort Level making progress toward outcome       Comments: Care plan discussed with pt. Understanding of plan acknowledged by pt. Plan for pain management discussed with pt. PRN analgesia administered as requested by pt. Fall precautions explained to pt. Pt verbalized  understanding of precautions and safety instructions. Bed in low, locked position, call light within reach. Personal items within a safe distance for reach. Pt remains free from falls this shift.

## 2018-06-08 NOTE — ASSESSMENT & PLAN NOTE
- Developed recurrent episodes of temp 100-101 F since initial OR debridement. Likely due to lack of source control, given foreign object remains embedded in wound. Blood cultures 6/5/18 NGTD. Low suspicion for urinary or pulmonary source of infection, given lack of symptoms/signs of systemic involvement. No signs/sx of allergic reaction to cephalosporin.  - Continue PO tylenol prn.  - IV tylenol prn for sustained fever.  - Monitor.

## 2018-06-08 NOTE — SUBJECTIVE & OBJECTIVE
Interval Hx: S/p  right foot I&D DOS:6/2/18, 6/5/18 and 6/7/18 . Reports pain improved well controlled with pain meds. Removed large piece of glass from wound however  xrays reviewed noting residual foreign body in foot.     Scheduled Meds:   atorvastatin  80 mg Oral Daily    carvedilol  12.5 mg Oral BID    ceFAZolin (ANCEF) IVPB  2 g Intravenous Q8H    enoxaparin  40 mg Subcutaneous Daily    insulin aspart U-100  20 Units Subcutaneous TIDWM    insulin detemir U-100  55 Units Subcutaneous QHS    Lactobacillus rhamnosus GG  1 capsule Oral Daily    lisinopril  40 mg Oral Daily    meperidine  12.5 mg Intravenous Once    psyllium husk (aspartame)  1 packet Oral Daily    sodium chloride 0.9%  300 mL Intravenous Once     Continuous Infusions:  PRN Meds:acetaminophen, dextrose 50%, dextrose 50%, glucagon (human recombinant), glucose, glucose, insulin aspart U-100, ondansetron, oxyCODONE, sodium chloride 0.9%, sodium chloride 0.9%, sodium chloride 0.9%    Review of patient's allergies indicates:   Allergen Reactions    Penicillins Anaphylaxis    Shellfish containing products      Other reaction(s): Unknown    Vancomycin Itching    Bactrim  [sulfamethoxazole-trimethoprim] Rash        Past Medical History:   Diagnosis Date    Allergy     CAD (coronary artery disease), native coronary artery 6/25/2013    Cancer     Diabetes mellitus     Diabetes mellitus, type 2     Disorder of kidney and ureter     Heart attack 04/2012    Hyperlipidemia     Hypertension     Muscular pain     post-op after colonoscopy     Past Surgical History:   Procedure Laterality Date    COLONOSCOPY N/A 2/17/2017    Procedure: COLONOSCOPY;  Surgeon: Simon Gomez MD;  Location: [facility] (4TH FLR);  Service: Endoscopy;  Laterality: N/A;    CORONARY ANGIOPLASTY      INCISION AND DRAINAGE FOOT Right 6/5/2018    Procedure: INCISION AND DRAINAGE, FOOT;  Surgeon: Bridget Santana DPM;  [facility];  Service:  Podiatry;  Laterality: Right;  Request mini C arm in room. request wound vac with medium black sponge    INCISION AND DRAINAGE FOOT Right 6/7/2018    Procedure: INCISION AND DRAINAGE, FOOT;  Surgeon: Emelia Brock DPM;  [facility];  Service: Podiatry;  Laterality: Right;    INCISION AND DRAINAGE OF ABSCESS Right 6/2/2018    Procedure: INCISION AND DRAINAGE, ABSCESS;  Surgeon: Bridget Santana DPM;  [facility];  Service: General;  Laterality: Right;    REMOVAL OF FOREIGN BODY FROM FOOT Right 6/7/2018    Procedure: REMOVAL, FOREIGN BODY, FOOT;  Surgeon: Emelia Brock DPM;  [facility];  Service: Podiatry;  Laterality: Right;    TONSILLECTOMY         Family History     Problem Relation (Age of Onset)    Heart disease Mother, Brother        Social History Main Topics    Smoking status: Never Smoker    Smokeless tobacco: Never Used    Alcohol use Yes      Comment: rare x1 beer-     Drug use: No    Sexual activity: Not Currently     Review of Systems   Constitutional: Positive for activity change.   Respiratory: Negative for shortness of breath.    Cardiovascular: Negative for chest pain and leg swelling.   Gastrointestinal: Negative for nausea and vomiting.   Genitourinary: Negative.    Musculoskeletal: Positive for arthralgias.   Skin: Positive for wound.   Neurological: Negative for weakness and numbness.   Psychiatric/Behavioral: Negative.      Objective:     Vital Signs (Most Recent):  Temp: 99.1 °F (37.3 °C) (06/08/18 1536)  Pulse: (!) 59 (06/08/18 1536)  Resp: 18 (06/08/18 1536)  BP: 107/71 (06/08/18 1536)  SpO2: 96 % (06/08/18 1536) Vital Signs (24h Range):  Temp:  [98.1 °F (36.7 °C)-100.1 °F (37.8 °C)] 99.1 °F (37.3 °C)  Pulse:  [55-77] 59  Resp:  [16-20] 18  SpO2:  [93 %-98 %] 96 %  BP: (107-135)/(64-84) 107/71     Weight: 122.5 kg (270 lb)  Body mass index is 35.62 kg/m².    Foot Exam    General  Orientation: alert and oriented to person, place, and time        Right Foot/Ankle     Inspection and Palpation  Tenderness: metatarsals   Swelling: metatarsals   Skin Exam: ulcer;     Neurovascular  Dorsalis pedis: 1+  Posterior tibial: 1+      Left Foot/Ankle      Inspection and Palpation  Tenderness: none   Swelling: none   Skin Exam: erythema;     Neurovascular  Dorsalis pedis: 1+  Posterior tibial: 1+          6/6/18    Wound 1:Plantar right foot   Measurement: 5cmx0.2cmx0.5cm  Base: fibrous   Periwound skin: macerated tissue -   Drainage: serous  Erythema: continued erythema noted to right foot.   Probe: negatetive probe to bone.     Left second toe with dusky appearance- improved.     Wound 1: Right dorsal foot.   Measurement:2 incisions one at base of 2nd-- 0.5cmx0.5cmx0.5cm and 3rd toe. One at plantar foot-[id number] , 2nd interdigital space 1.0cmx0.5cmx0.1cm.    Base: fibrous base   Periwound skin: erythema with maceration.   Drainage: serous, exam limited 2/2 to pain.   Erythema: moderate   Probe: none, no bone exposed            Gentian violet applied to right plantar foot.                           Laboratory:  CBC:     Recent Labs  Lab 06/08/18  0521   WBC 10.37   RBC 3.86*   HGB 11.3*   HCT 34.1*      MCV 88   MCH 29.3   MCHC 33.1     CMP:     Recent Labs  Lab 06/08/18  0521   *   CALCIUM 8.3*   ALBUMIN 2.0*   PROT 6.4   *   K 4.7   CO2 25   CL 99   BUN 11   CREATININE 0.9   ALKPHOS 103   ALT 27   AST 33   BILITOT 0.3       Diagnostic Results:  Xray: There is DJD and spurs on the calcaneus.  There is a foreign body soft tissues below in between the 1st and 2nd metatarsals thought to be a shard of glass.  There may be soft tissue swelling.  No fracture dislocation bone destruction seen.    MRI: No MR evidence of osteomyelitis.    Diffuse subcutaneous and myofascial forefoot edema with multiple plantar skin defects, overall, concerning for cellulitis/myositis.  Associated radiopaque foreign body likely reflecting glass is more conspicuous  on comparison foot radiograph.    Nonspecific dermal fluid collection along the plantar aspect of the 1st and 2nd metatarsophalangeal joints, possibly a blister.  An infected fluid collection cannot be excluded.  Suggest correlation with direct visualization and clinical findings.    Clinical Findings:  S/p  right foot I&D. Surgical site stable with serous drainage. Pain upon palpation right foot.

## 2018-06-08 NOTE — PROGRESS NOTES
[facility]  Infectious Disease  Progress Note    Patient Name: Billy Sheffield Miguel  MRN: [id number]  Admission Date: 6/1/2018  Length of Stay: 7 days  Attending Physician: Josiah Sawyer, *  Primary Care Provider: BRAD Baker MD    Isolation Status: No active isolations  Assessment/Plan:      Right foot infection       52-year-old male admitted with right foot infection after penetrating glass trauma that failed outpatient antibiotics alone. In the ED patient was noted to have a fever, WBC of 15K, , . MRI showed diffuse subcutaneous and myofascial forefoot edema, fluid collection with associated radiopaque foreign body. No osteomyelitis. He was taken to the OR 6/2 and again 6/5 for washout with podiatry. Copious amounts of purulent drainage noted with tracking to dorsal foot. Surgical cultures grew GBS. Foreign body was found at 1st interdigital space but unable to remove. He went to the OR again yesterday and a large piece of glass was removed. Small piece of residual glass left in soft tissue. Purulence and liquid necrosis was encounterd in the OR. Unfortunately, no surgical cultures were obtained during surgery on 6/5 or 6/7 as discussed.     Patient is currently on IV Cefazolin. Afebrile over the last 24 hours. No leukocytosis. Clinically stable/non septic appearing. Right second toe continues to appear dusky with ischemic changes. Podiatry monitoring closely.      Plan  - Continue Cefazolin 2 g IV q 8 hours. Will not broaden coverage at this time as he has been afebrile over the last 24 hours and is clinically stable.  - Monitor closely for any clinical decompensation. Concerned that infection will not clear without removal of retained glass.  - Given severity of infection and close involvement of bone, would plan to treat as presumed osteomyelitis with 6 weeks of IV antibiotics  - If fevers recur, low threshold to broaden antibiotic coverage.   - Recommend Allergy  follow up as an outpatient for PCN skin testing   - ID will follow.             Please call for any questions. For questions over the weekend, please page Ana Maria Cancino NP with ID. Thank you.  Cielo Almazan PA-C  Phone: [phone]  Pager: [phone]      Subjective:     Principal Problem:Diabetic ulcer of right midfoot    HPI: Mr. Rivera is a 52-year-old male admitted with right foot infection. Patient states that he stepped on a piece of glass at work that penetrated through his shoe on May 19.  At that time he removed the glass, used antiseptic and antibacterial on his foot and felt that things were ok. He began having issues on May 26, when he noted that a large blister developed on his plantar surface near the first digit. No fevers, chills or sweats at home. On the 29th he was seen by his PCP and started on Cipro and Clindamycin. Patient notes that the clinda and Cipro gave him diarrhea, severe nausea and vomiting. Unfortunately his symptoms did not improve on antibiotics alone prompting him to come to the ED.     In the ED patient was noted to have a WBC of 15K, , , procalc 0.29. MRI showed diffuse subcutaneous and myofascial forefoot edema, fluid collection along the plantar aspect of the 1st and 2nd metatarsophalangeal joints with associated radiopaque foreign body likely reflecting glass.  No osteomyelitis. He was started on Dapto, Flagyl and Aztreonam given documented PCN allergy. He was taken to the OR yesterday for washout with podiatry. Copious amounts of purulent drainage noted from ulceration with tracking noted to dorsal foot at 1st interdigital space. Surgical cultures are growing GBS. He had an isolated fever post op. WBC is trending down. He is seen at bedside and feels well today. Pain controlled. Patient denies current fevers, chills, sweats, dysuria, rash, shortness of breath, chest pain, abdominal pain. Diarrhea and nausea resolved. Of note, discussed his PCN allergy and patient states  that his reaction was as a child and he does not remember the episode. His mother told him he went into a coma after receiving IM PCN. He denies being told of any anaphylactic like reactions. He is unaware if he has ever been treated with a cephalosporin.  Interval History:   NAEON. T max 1001. No leukocytosis. Tolerated surgery well yesterday. Large piece of glass was removed. Still small piece remaining.  He denies night sweats or chills. Foot pain well controlled. Right second toe still appears dusky.       Review of Systems   Constitutional: Negative for chills, diaphoresis and fever.   Respiratory: Negative for cough and shortness of breath.    Cardiovascular: Positive for leg swelling. Negative for chest pain.   Gastrointestinal: Negative for abdominal pain, diarrhea, nausea and vomiting.   Genitourinary: Negative for difficulty urinating, dysuria, frequency and hematuria.   Musculoskeletal: Positive for arthralgias (right foot) and joint swelling. Negative for back pain.   Skin: Positive for color change (redness RLE) and wound. Negative for rash.   Neurological: Negative for weakness and headaches.   Hematological: Negative for adenopathy.   Psychiatric/Behavioral: Negative for confusion. The patient is not nervous/anxious.    All other systems reviewed and are negative.    Objective:     Vital Signs (Most Recent):  Temp: 99.1 °F (37.3 °C) (06/08/18 1536)  Pulse: (!) 59 (06/08/18 1536)  Resp: 18 (06/08/18 1536)  BP: 107/71 (06/08/18 1536)  SpO2: 96 % (06/08/18 1536) Vital Signs (24h Range):  Temp:  [97.3 °F (36.3 °C)-100.1 °F (37.8 °C)] 99.1 °F (37.3 °C)  Pulse:  [55-77] 59  Resp:  [16-20] 18  SpO2:  [93 %-100 %] 96 %  BP: (100-135)/(64-84) 107/71     Weight: 122.5 kg (270 lb)  Body mass index is 35.62 kg/m².    Estimated Creatinine Clearance: 130.1 mL/min (based on SCr of 0.9 mg/dL).    Physical Exam   Constitutional: He is oriented to person, place, and time. He appears well-developed and well-nourished.  No distress.   HENT:   Head: Normocephalic and atraumatic.   Eyes: Conjunctivae are normal. No scleral icterus.   Cardiovascular: Normal rate, regular rhythm and intact distal pulses.    No murmur heard.  Pulmonary/Chest: Effort normal. No respiratory distress. He has no wheezes.   Abdominal: Soft. He exhibits no distension. There is no tenderness. There is no guarding.   Musculoskeletal: He exhibits edema and tenderness (right foot).   Neurological: He is alert and oriented to person, place, and time.   Skin: Skin is warm and dry. He is not diaphoretic. There is erythema.   Right dorsal foot warmth, erythema and edema. Digits appear dusky and edematous. Right 4th and 5th digits pale. Cellulitis to RLE improved. TTP.    Psychiatric: He has a normal mood and affect. His behavior is normal. Thought content normal.       Significant Labs:   Blood Culture:     Recent Labs  Lab 06/01/18  1445 06/02/18  2111 06/02/18  2115 06/06/18  0950   LABBLOO No growth after 5 days. No growth after 5 days. No growth after 5 days. No Growth to date  No Growth to date  No Growth to date     CBC:     Recent Labs  Lab 06/07/18  0339 06/08/18  0521   WBC 10.51 10.37   HGB 11.7* 11.3*   HCT 35.3* 34.1*    272     CMP:     Recent Labs  Lab 06/07/18  0339 06/08/18  0521   * 131*   K 4.0 4.7   CL 99 99   CO2 27 25   * 255*   BUN 10 11   CREATININE 1.0 0.9   CALCIUM 8.3* 8.3*   PROT 6.4 6.4   ALBUMIN 2.0* 2.0*   BILITOT 0.3 0.3   ALKPHOS 103 103   AST 33 33   ALT 26 27   ANIONGAP 9 7*   EGFRNONAA >60.0 >60.0     Wound Culture:     Recent Labs  Lab 06/02/18  1218 06/02/18  1226   LABAERO STREPTOCOCCUS AGALACTIAE (GROUP B)ManyBeta-hemolytic streptococci are routinely susceptible to penicillins,cephalosporins and carbapenems.Susceptibility testing not routinely performed STREPTOCOCCUS AGALACTIAE (GROUP B)ManyBeta-hemolytic streptococci are routinely susceptible to penicillins,cephalosporins and carbapenems.Susceptibility  testing not routinely performed     All pertinent labs within the past 24 hours have been reviewed.    Significant Imaging: I have reviewed all pertinent imaging results/findings within the past 24 hours.

## 2018-06-08 NOTE — ASSESSMENT & PLAN NOTE
52-year-old male admitted with right foot infection after penetrating glass trauma that failed outpatient antibiotics alone. In the ED patient was noted to have a fever, WBC of 15K, , . MRI showed diffuse subcutaneous and myofascial forefoot edema, fluid collection with associated radiopaque foreign body. No osteomyelitis. He was taken to the OR 6/2 and again 6/5 for washout with podiatry. Copious amounts of purulent drainage noted with tracking to dorsal foot. Surgical cultures grew GBS. Foreign body was found at 1st interdigital space but unable to remove. He went to the OR again yesterday and a large piece of glass was removed. Small piece of residual glass left in soft tissue. Purulence and liquid necrosis was encounterd in the OR. Unfortunately, no surgical cultures were obtained during surgery on 6/5 or 6/7 as discussed.     Patient is currently on IV Cefazolin. Afebrile over the last 24 hours. No leukocytosis. Clinically stable/non septic appearing. Right second toe continues to appear dusky with ischemic changes. Podiatry monitoring closely.      Plan  - Continue Cefazolin 2 g IV q 8 hours. Will not broaden coverage at this time as he has been afebrile over the last 24 hours and is clinically stable.  - Monitor closely for any clinical decompensation. Concerned that infection will not clear without removal of retained glass.  - Given severity of infection and close involvement of bone, would plan to treat as presumed osteomyelitis with 6 weeks of IV antibiotics  - If fevers recur, low threshold to broaden antibiotic coverage.   - Recommend Allergy follow up as an outpatient for PCN skin testing   - ID will follow.

## 2018-06-08 NOTE — PROGRESS NOTES
[facility] Hospital Medicine  Progress Note    Patient Name: Billy Sheffield Miguel  MRN: [id number]  Patient Class: IP- Inpatient   Admission Date: 6/1/2018  Length of Stay: 6 days  Attending Physician: Josiah Sawyer, *  Primary Care Provider: BRAD Baker MD    [facility] Medicine Team: [facility] MED 2 Millicent Torre MD    Subjective:     Principal Problem:Diabetic ulcer of right midfoot    HPI:  Patient is a 52-year-old male with HTN, MI 2013 s/p 1 stent, DMT2, colon cancer s/p resection 2017 who presents after failing outpatient treatment for a right ulcer.  Patient states that he stepped on a piece of glass at work where he is  on the 19th of May and it went through his shoe. At that time he removed the glass, used antiseptic and antibacterial on his foot and felt that things are going OK. He didn't have any issues until last Saturday The 26th, when he noted that a puffy cherry blister was occurring on his plantar surface near the first digit. On Tuesday, the 29th he presented to his primary care physician in [place] where he was started on clinda and cipro. Patient notes that the clinda and Cipro gave him diarrhea and vomiting. He stated that he had diarrhea three times a day with some form, mostly liquid, no blood and it has been the same since it started. His vomiting occurred five times yesterday, and was brownish with food. His last diarrhea episode was at 10:30 this morning, and vomiting at 4:30 in the afternoon. The patient presented to his outside PCP today, without relief of symptoms on antibiotics. Patient denies fever, chills, nausea, vomiting, diarrhea, dysuria, rash, shortness of breath, chest pain, abdominal pain.  Patient states that his diabetes started 15 to 20 years ago, and has been reasonably well controlled since then with no neuropathy, optic issues or kidney issues. Patient notes that he had a stent in 2013 for coronary artery disease, and colon cancer  that was resected in April 2017 without any issues on follow up.    Hospital Course:  6/1/2018- admission date. Empirically started on daptomycin due to PCN allergy, aztreonam, flagyl for anaerobe coverage. Diarrhea resolved on admission, thought to be side effect of outpatient abx rather than c diff.  6/2/2018- Received OR debridement by podiatry. Tolerated procedure well. Had fever of 101.9 overnight. WBC persistently ~13. VASILIY 1.14 in LLE. RLE VASILIY unable to be obtained due to wound/infection.  6/3/2018- Aerobic wound culture grew strep agalactiae. Per ID recs, abx switched to cefazolin with no adverse effects. Fever of 101.4. Repeat XR of foot showed residual piece of glass in foot.  06/04/2018: Had not had any BM since admission. Was started on bowel regimen. However, pt start having diarrhea again, 4 episodes during the day, before receiving any bowel regimen. Lactulose and miralax were not given. Pt received 1 dose of senna-docusate overnight. Had 1 more episode of diarrhea overnight. Persistent hyponatremia with Na+ in low 130s. Urine studies showed low-normal urine sodium and normal osmolality. Fever of 101 recurred for 8 hours, resolved with tylenol. Pt denied symptoms during febrile episode. WBC slightly improved, left shift persisted. Thought to be due to residual foreign object embedded in foot.  06/05/2018: Recurrence of diarrhea thought to be secondary to antibiotics, given similar presentation while pt was previously on PO abx prior to admission. Started on probiotics. Stool became formed later in the day. Went to OR again for removal of remaining piece of glass in foot. Found to have purulent drainage from wound, which was debrided. However, glass was found to be too deeply embedded in tissue and was unable to be retrieved despite multiple attempts. Post op XR of foot showed soft tissue swelling and gas. Immediately post op, pt's fever recurred with temp up to 102 for 8 hours overnight.  6/6/2018:  reports fever and fatigue. Denies chills, myalgia, cough, sore throat, dysuria, WBC stable at 13. Denies foot pain or bleeding. Hgb stable. Right second toe noted to be blue/pale on exam. Thought to be bruising from pressure applied on toe in OR during debridement of foot vs ischemia (given unknown RLE VASILIY). Monitored for swelling, per podiatry recs.  06/07/2018: had fever of 101.1 F overnight that resolved with 1 dose of PO tylenol. VS otherwise stable. Pain controlled with oxycodone IR 5 mg x 1. WBC improved to 10. No BM since I/D two days ago. Went to OR again for I/D in attempt to remove residual piece of glass in wound. Underwent BLE arterial angiogram for ischemic evaluation.    Interval History:  Febrile to 101.1 F overnight. No complaints this morning. Denies foot pain. No BM since last I/D 2 days ago.    Review of Systems   Constitutional: Negative for chills, fever and unexpected weight change.   HENT: Negative for hearing loss.    Eyes: Negative for visual disturbance.   Respiratory: Negative for shortness of breath.    Cardiovascular: Negative for chest pain and leg swelling.   Gastrointestinal: Negative for abdominal pain, constipation, diarrhea, nausea and vomiting.   Genitourinary: Negative for dysuria and hematuria.   Musculoskeletal: Negative for arthralgias.   Skin: Positive for color change, rash and wound.   Neurological: Negative for seizures and weakness.   Hematological: Negative for adenopathy. Does not bruise/bleed easily.     Objective:     Vital Signs (Most Recent):  Temp: 98.1 °F (36.7 °C) (06/07/18 1800)  Pulse: (!) 55 (06/07/18 1800)  Resp: 17 (06/07/18 1800)  BP: 134/84 (06/07/18 1800)  SpO2: 98 % (06/07/18 1800) Vital Signs (24h Range):  Temp:  [97.3 °F (36.3 °C)-101.1 °F (38.4 °C)] 98.1 °F (36.7 °C)  Pulse:  [55-75] 55  Resp:  [16-18] 17  SpO2:  [92 %-100 %] 98 %  BP: (100-147)/(58-88) 134/84     Weight: 122.5 kg (270 lb)  Body mass index is 35.62 kg/m².    Intake/Output Summary  (Last 24 hours) at 06/07/18 1858  Last data filed at 06/07/18 1637   Gross per 24 hour   Intake             1370 ml   Output             1100 ml   Net              270 ml      Physical Exam   Constitutional: He is oriented to person, place, and time. He appears well-developed and well-nourished. No distress.   HENT:   Head: Normocephalic and atraumatic.   Eyes: Conjunctivae, EOM and lids are normal.   Neck: No tracheal deviation present. No thyromegaly present.   Cardiovascular: Normal rate, regular rhythm, normal heart sounds and intact distal pulses.    No murmur heard.  Pulmonary/Chest: Effort normal and breath sounds normal. No respiratory distress. He has no wheezes. He has no rales.   Abdominal: Soft. He exhibits no distension and no mass. There is no tenderness. No hernia.   Musculoskeletal: He exhibits tenderness. He exhibits no edema.   Right foot bandage, intact, clean, dry. Point tenderness on sole of foot at location of wound. R second toe with purple discoloration and decreased strength/ROM compared to other toes.   Neurological: He is alert and oriented to person, place, and time. No cranial nerve deficit. He exhibits normal muscle tone.   Skin: Skin is warm and dry. Rash noted. He is not diaphoretic. There is erythema.   RLE erythema up to mid shin, no change from marked border. Non tender, non edematous.   Psychiatric: He has a normal mood and affect.   Vitals reviewed.    Significant Labs: All pertinent labs within the past 24 hours have been reviewed.  CBC:   6/7/2018 03:39   WBC 10.51   RBC 4.00 (L)   Hemoglobin 11.7 (L)   Hematocrit 35.3 (L)   MCV 88   MCH 29.3   MCHC 33.1   RDW 12.0   Platelets 257   MPV 9.6   Gran% 68.3   Gran # (ANC) 7.2   Immature Granulocytes 2.1 (H)   Immature Grans (Abs) 0.22 (H)   Lymph% 14.5 (L)   Lymph # 1.5   Mono% 12.1   Mono # 1.3 (H)   Eosinophil% 2.6   Eos # 0.3   Basophil% 0.4   Baso # 0.04   nRBC 0     CMP:   6/7/2018 03:39   Sodium 135 (L)   Potassium 4.0    Chloride 99   CO2 27   Anion Gap 9   BUN, Bld 10   Creatinine 1.0   eGFR if non African American >60.0   eGFR if African American >60.0   Glucose 251 (H)   Calcium 8.3 (L)   Phosphorus 3.2   Magnesium 1.7   Alkaline Phosphatase 103   Total Protein 6.4   Albumin 2.0 (L)   Total Bilirubin 0.3   AST 33   ALT 26     DM panel:   6/6/2018 12:06 6/6/2018 16:50 6/6/2018 21:08 6/7/2018 10:06 6/7/2018 13:59 6/7/2018 17:10   POCT Glucose 238 (H) 260 (H) 195 (H) 182 (H) 187 (H) 181 (H)      6/6/2018 09:50 6/6/2018 09:50   Blood Culture, Routine No Growth to date No Growth to date     Significant Imaging: I have reviewed and interpreted all pertinent imaging results/findings within the past 24 hours.   No new imaging.    Assessment/Plan:      * Diabetic ulcer of right midfoot    Cellulitis  Foreign object (glass) in foot  - S/p OR debridement by podiatry on day 2. Aerobic wound culture on 6/2/18 grew strep agalactiae. Anaerobic culture pending. No signs of osteomyelitis. Blood culture NGTD. WBC persistently 13. Repeat XR foot showed residual piece of glass in wound. Developed recurrent fevers with temp up to 102 F. Unsuccessful attempts to retrieve remaining piece of glass in foot in OR on day 5. Continues to have fever, consistent with lack of source control. Cellulitis of R leg improving.  - Continue cefazolin 2 g IV q8h, per ID recs (started on day 3, s/p aztreonam, flagyl, daptomycin initially).  - Repeat I/D today to re-attempt surgical removal of remaining piece of glass in foot, per podiatry.  - Plan to discharge home on PO cephalosporin.        Fever    - Developed recurrent episodes of temp 100-101 F since initial OR debridement. Likely due to lack of source control, given foreign object remains embedded in wound. Blood cultures 6/5/18 NGTD. Low suspicion for urinary or pulmonary source of infection, given lack of symptoms/signs of systemic involvement. No signs/sx of allergic reaction to cephalosporin.  - Continue PO  tylenol prn.  - IV tylenol prn for sustained fever.  - Monitor.        Leukocytosis    - WBC persistently ~13 throughout course. Likely due to persistent wound infection, given foreign object remains embedded in foot. Ddx includes R leg cellulitis.  - Improved to 10 today.  - Monitor.        Type 2 diabetes mellitus 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, with long-term current use of insulin    - Uncontrolled outpatient with hgb A1C of 12, 's, and anion gap 14 on admission (previously 9.7 in 2/2018). On insulin glargine 55 units, aspart 20 units TIDWM, metformin, trulicity at home.  - BG currently 190s to 260s on 80% home insulin regimen, required 11 units SSI yesterday.  - Increase insulin detemir to 50 units daily.  - Increase insulin aspart to 17 units TIDWM.  - Continue SSI.  - POCT glucose AC & HS.  - Holding home metformin and trulicity while in house.        HTN (hypertension)    - On lisinopril 40 mg daily and carvedilol 12.5 mg BID at home.  - SBP currently low 100s-140s, at goal.  - Continue home ACE-I.  - Continue home BB.  - Monitor.        Diarrhea    - Initially presented with diarrhea, likely side effect of PO abx outpatient. No BM from day of admission to day 4, likely opiate-induced ileus. Recurrence of diarrhea on day 5, again likely side effect of abx use inpatient. Low suspicion for c diff, given mild leukocytosis, spontaneous resolution/improvement, and lack of abdominal pain.  - Currently controlled with no BM since 2 days ago.  - Continue probiotics.  - Holding bowel regimen.        CAD s/p PCI of LAD (SHELBY) in May 2013    - On ASA 81 mg daily and atorvastatin 80 mg daily at home.  - Currently stable. Asymptomatic.  - Continue home ASA.  - Continue home statin.        Dyslipidemia    - On atorvastatin 80 mg daily at home.  - Continue home statin.          VTE Risk Mitigation         Ordered     enoxaparin injection 40 mg  Daily       06/05/18 1706     IP VTE HIGH RISK PATIENT  Once      06/02/18 1552        Millicent Torre MD  Department of [facility] Medicine   [facility]

## 2018-06-08 NOTE — PROGRESS NOTES
[facility] Hospital Medicine  Progress Note    Patient Name: Billy Sheffield Miguel  MRN: [id number]  Patient Class: IP- Inpatient   Admission Date: 6/1/2018  Length of Stay: 7 days  Attending Physician: Josiah Sawyer, *  Primary Care Provider: BRAD Baker MD    [facility] Medicine Team: [facility] 2 Shan Soto MD    Subjective:     Principal Problem:Diabetic ulcer of right midfoot    HPI:  Patient is a 52-year-old male with HTN, MI 2013 s/p 1 stent, DMT2, colon cancer s/p resection 2017 who presents after failing outpatient treatment for a right ulcer.  Patient states that he stepped on a piece of glass at work where he is  on the 19th of May and it went through his shoe. At that time he removed the glass, used antiseptic and antibacterial on his foot and felt that things are going OK. He didn't have any issues until last Saturday The 26th, when he noted that a puffy cherry blister was occurring on his plantar surface near the first digit. On Tuesday, the 29th he presented to his primary care physician in [place] where he was started on clinda and cipro. Patient notes that the clinda and Cipro gave him diarrhea and vomiting. He stated that he had diarrhea three times a day with some form, mostly liquid, no blood and it has been the same since it started. His vomiting occurred five times yesterday, and was brownish with food. His last diarrhea episode was at 10:30 this morning, and vomiting at 4:30 in the afternoon. The patient presented to his outside PCP today, without relief of symptoms on antibiotics. Patient denies fever, chills, nausea, vomiting, diarrhea, dysuria, rash, shortness of breath, chest pain, abdominal pain.  Patient states that his diabetes started 15 to 20 years ago, and has been reasonably well controlled since then with no neuropathy, optic issues or kidney issues. Patient notes that he had a stent in 2013 for coronary artery disease, and colon  cancer that was resected in April 2017 without any issues on follow up.    Hospital Course:  6/1/2018- admission date. Empirically started on daptomycin due to PCN allergy, aztreonam, flagyl for anaerobe coverage. Diarrhea resolved on admission, thought to be side effect of outpatient abx rather than c diff.  6/2/2018- Received OR debridement by podiatry. Tolerated procedure well. Had fever of 101.9 overnight. WBC persistently ~13. VASILIY 1.14 in LLE. RLE VASILIY unable to be obtained due to wound/infection.  6/3/2018- Aerobic wound culture grew strep agalactiae. Per ID recs, abx switched to cefazolin with no adverse effects. Fever of 101.4. Repeat XR of foot showed residual piece of glass in foot.  06/04/2018: Had not had any BM since admission. Was started on bowel regimen. However, pt start having diarrhea again, 4 episodes during the day, before receiving any bowel regimen. Lactulose and miralax were not given. Pt received 1 dose of senna-docusate overnight. Had 1 more episode of diarrhea overnight. Persistent hyponatremia with Na+ in low 130s. Urine studies showed low-normal urine sodium and normal osmolality. Fever of 101 recurred for 8 hours, resolved with tylenol. Pt denied symptoms during febrile episode. WBC slightly improved, left shift persisted. Thought to be due to residual foreign object embedded in foot.  06/05/2018: Recurrence of diarrhea thought to be secondary to antibiotics, given similar presentation while pt was previously on PO abx prior to admission. Started on probiotics. Stool became formed later in the day. Went to OR again for removal of remaining piece of glass in foot. Found to have purulent drainage from wound, which was debrided. However, glass was found to be too deeply embedded in tissue and was unable to be retrieved despite multiple attempts. Post op XR of foot showed soft tissue swelling and gas. Immediately post op, pt's fever recurred with temp up to 102 for 8 hours  overnight.  6/6/2018: reports fever and fatigue. Denies chills, myalgia, cough, sore throat, dysuria, WBC stable at 13. Denies foot pain or bleeding. Hgb stable. Right second toe noted to be blue/pale on exam. Thought to be bruising from pressure applied on toe in OR during debridement of foot vs ischemia (given unknown RLE VASILIY). Monitored for swelling, per podiatry recs.  06/07/2018: had fever of 101.1 F overnight that resolved with 1 dose of PO tylenol. VS otherwise stable. Pain controlled with oxycodone IR 5 mg x 1. WBC improved to 10. No BM since I/D two days ago. Went to OR again for I/D in attempt to remove residual piece of glass in wound. Underwent BLE arterial angiogram for ischemic evaluation.    Interval History: No acute overnight events, afebrile. Patient received I&D by podiatry yesterday. Piece of glass able to be removed, small tiny piece of residual glass seen on x-ray by radiology. C/w IV antibiotics.    Review of Systems  Objective:     Vital Signs (Most Recent):  Temp: 99.4 °F (37.4 °C) (06/08/18 1119)  Pulse: 63 (06/08/18 1119)  Resp: 18 (06/08/18 1119)  BP: 119/70 (06/08/18 1119)  SpO2: (!) 93 % (06/08/18 1119) Vital Signs (24h Range):  Temp:  [97.3 °F (36.3 °C)-100.1 °F (37.8 °C)] 99.4 °F (37.4 °C)  Pulse:  [55-77] 63  Resp:  [16-20] 18  SpO2:  [93 %-100 %] 93 %  BP: (100-135)/(64-84) 119/70     Weight: 122.5 kg (270 lb)  Body mass index is 35.62 kg/m².    Intake/Output Summary (Last 24 hours) at 06/08/18 1509  Last data filed at 06/07/18 2022   Gross per 24 hour   Intake              400 ml   Output              560 ml   Net             -160 ml      Physical Exam   Constitutional: He is oriented to person, place, and time. He appears well-developed and well-nourished. No distress.   HENT:   Head: Normocephalic and atraumatic.   Eyes: Conjunctivae, EOM and lids are normal.   Neck: No tracheal deviation present. No thyromegaly present.   Cardiovascular: Normal rate, regular rhythm, normal heart  sounds and intact distal pulses.    No murmur heard.  Pulmonary/Chest: Effort normal and breath sounds normal. No respiratory distress. He has no wheezes. He has no rales.   Abdominal: Soft. He exhibits no distension and no mass. There is no tenderness. No hernia.   Musculoskeletal: He exhibits tenderness. He exhibits no edema.   Right foot bandage, intact, clean, dry. Point tenderness on sole of foot at location of wound.   Neurological: He is alert and oriented to person, place, and time. No cranial nerve deficit. He exhibits normal muscle tone.   Skin: Skin is warm and dry. Rash noted. He is not diaphoretic. There is erythema.   RLE erythema up to mid shin, no change from marked border. Non tender, non edematous.   Psychiatric: He has a normal mood and affect.   Vitals reviewed.      Significant Labs:   BMP:   Recent Labs  Lab 06/08/18  0521   *   *   K 4.7   CL 99   CO2 25   BUN 11   CREATININE 0.9   CALCIUM 8.3*   MG 1.7     CBC:   Recent Labs  Lab 06/07/18  0339 06/08/18  0521   WBC 10.51 10.37   HGB 11.7* 11.3*   HCT 35.3* 34.1*    272     All pertinent labs within the past 24 hours have been reviewed.    Significant Imaging: I have reviewed all pertinent imaging results/findings within the past 24 hours.    Assessment/Plan:      * Diabetic ulcer of right midfoot    Cellulitis  Foreign object (glass) in foot  - S/p OR debridement by podiatry on day 2. Aerobic wound culture on 6/2/18 grew strep agalactiae. Anaerobic culture pending. No signs of osteomyelitis. Blood culture NGTD. WBC persistently 13. Repeat XR foot showed residual piece of glass in wound. Developed recurrent fevers with temp up to 102 F. Unsuccessful attempts to retrieve remaining piece of glass in foot in OR on day 5. Continues to have fever, consistent with lack of source control. Cellulitis of R leg improving.  - Continue cefazolin 2 g IV q8h, per ID recs (started on day 3, s/p aztreonam, flagyl, daptomycin initially).  -  Repeat I/D 6/7w/ removal of shard of glass in foot by podiatry,however, tiny residual piece remains seen on x-ray  - will monitor patient overnight for fevers, may likely require IV antibiotics on discharge, will defer to ID        Diarrhea    - Initially presented with diarrhea, likely side effect of PO abx outpatient. No BM from day of admission to day 4, likely opiate-induced ileus. Recurrence of diarrhea on day 5, again likely side effect of abx use inpatient. Low suspicion for c diff, given mild leukocytosis, spontaneous resolution/improvement, and lack of abdominal pain.  - Currently controlled with no BM since 2 days ago.  - Continue probiotics.  - Holding bowel regimen.        Fever    - Developed recurrent episodes of temp 100-101 F since initial OR debridement. Likely due to lack of source control, given foreign object remains embedded in wound. Blood cultures 6/5/18 NGTD. Low suspicion for urinary or pulmonary source of infection, given lack of symptoms/signs of systemic involvement. No signs/sx of allergic reaction to cephalosporin.  - Continue PO tylenol prn.  - IV tylenol prn for sustained fever.  - Monitor.        Leukocytosis    - WBC persistently ~13 throughout course. Likely due to persistent wound infection, given foreign object remains embedded in foot. Ddx includes R leg cellulitis.  - resolved  - Monitor.        Type 2 diabetes mellitus 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, with long-term current use of insulin    - Uncontrolled outpatient with hgb A1C of 12, 's, and anion gap 14 on admission (previously 9.7 in 2/2018). On insulin glargine 55 units, aspart 20 units TIDWM, metformin, trulicity at home.  - Increase insulin detemir to 55 units daily.  - Increase insulin aspart to 20 units TIDWM.  - Continue SSI.  - POCT glucose AC & HS.  - Holding home metformin and trulicity while in house.        CAD s/p PCI of LAD (SHELBY) in May 2013    - On  ASA 81 mg daily and atorvastatin 80 mg daily at home.  - Currently stable. Asymptomatic.  - Continue home ASA.  - Continue home statin.        Dyslipidemia    - On atorvastatin 80 mg daily at home.  - Continue home statin.        HTN (hypertension)    - On lisinopril 40 mg daily and carvedilol 12.5 mg BID at home.  - SBP currently low 100s-140s, at goal.  - Continue home ACE-I.  - Continue home BB.  - Monitor.          VTE Risk Mitigation         Ordered     enoxaparin injection 40 mg  Daily      06/05/18 1706     IP VTE HIGH RISK PATIENT  Once      06/02/18 2781              Shan Soto MD  Department of Hospital Medicine   [facility]

## 2018-06-08 NOTE — ASSESSMENT & PLAN NOTE
Cellulitis  Foreign object (glass) in foot  - S/p OR debridement by podiatry on day 2. Aerobic wound culture on 6/2/18 grew strep agalactiae. Anaerobic culture pending. No signs of osteomyelitis. Blood culture NGTD. WBC persistently 13. Repeat XR foot showed residual piece of glass in wound. Developed recurrent fevers with temp up to 102 F. Unsuccessful attempts to retrieve remaining piece of glass in foot in OR on day 5. Continues to have fever, consistent with lack of source control. Cellulitis of R leg improving.  - Continue cefazolin 2 g IV q8h, per ID recs (started on day 3, s/p aztreonam, flagyl, daptomycin initially).  - Repeat I/D 6/7w/ removal of shard of glass in foot by podiatry,however, tiny residual piece remains seen on x-ray  - will monitor patient overnight for fevers, may likely require IV antibiotics on discharge, will defer to ID

## 2018-06-08 NOTE — PLAN OF CARE
Problem: Physical Therapy Goal  Goal: Physical Therapy Goal  Goals to be met by: 18    Patient will increase functional independence with mobility by performin. Sit to stand transfer with Supervision  2. Gait  x 100 feet with Supervision using LRAD and w/b precautions   3. Ascend/descend 2 stair with bilateral Handrails Supervision using LRAD  Outcome: Ongoing (interventions implemented as appropriate)  PT Goals established following initial eval    Felice Wilkins PT, DPT  2018  Pager [phone]

## 2018-06-08 NOTE — PT/OT/SLP EVAL
Physical Therapy Evaluation    Patient Name:  Billy Rivera   MRN:  [id number]    Recommendations:     Discharge Recommendations:  home with home health   Discharge Equipment Recommendations: cane, straight, walker, rolling   Barriers to discharge: None    Assessment:     Billy Rivera is a 52 y.o. male admitted with a medical diagnosis of Diabetic ulcer of right midfoot.  He presents with the following impairments/functional limitations:  gait instability, decreased lower extremity function, orthopedic precautions, pain. Patient is a pleasant gentleman who is determined to return to plof. Patient is aware of his orthopedic precautions and demonstrates ability to ambulate/transfer w/ w/b through R heel only, despite darco boot not being present. Patient is expected to perform and progress well w/ skilled PT.     Rehab Prognosis:  good; patient would benefit from acute skilled PT services to address these deficits and reach maximum level of function.      Recent Surgery: Procedure(s) (LRB):  INCISION AND DRAINAGE, FOOT (Right)  REMOVAL, FOREIGN BODY, FOOT (Right) 1 Day Post-Op    Plan:     During this hospitalization, patient to be seen 3 x/week to address the above listed problems via gait training, therapeutic exercises  · Plan of Care Expires:  07/08/18   Plan of Care Reviewed with: patient    Subjective     Communicated with nurse prior to session.  Patient found supine w/ hob elevated upon PT entry to room, agreeable to evaluation.      Chief Complaint: orthopedic precautions, gait instability  Patient comments/goals: return to plof  Pain/Comfort:  · Pain Rating 1: 0/10 (no pain, just discomfort of R foot)    Patients cultural, spiritual, Amish conflicts given the current situation: none stated    Living Environment:  Patient lives w/ spouse in a single story house w/ 2STE    Prior to admission, patients level of function was independent.  Patient has the following equipment: bath bench, RW, SPC  (wife's equipment).  Upon discharge, patient will have assistance from spouse.    Objective:     Patient found with:  (all lines intact)     General Precautions: Standard, fall   Orthopedic Precautions:RLE partial weight bearing (through heel)   Braces:  (foot bandage)     Exams:  · Cognitive Exam:  Patient is oriented to Person, Place, Time and Situation and follows 100% of all commands   · Fine Motor Coordination:    · -       Intact  · Gross Motor Coordination:  WFL  · Sensation:    · -       Intact  · RLE ROM: WFL  · RLE Strength: WFL  · R foot in bandage  · LLE ROM: WFL  · LLE Strength: WFL    Functional Mobility:  · Bed Mobility:  Supine to Sit: independence  · Transfers:  Sit to Stand:  independence with no AD  · Bed to Chair: independence with  no AD  using  Stand Pivot  · Gait: 16' w/ no AD, SBA (PWB through R heel)  ·    10' w/ RW, SBA (PWB through R heel)  · Darco not present    AM-PAC 6 CLICK MOBILITY  Total Score:23       Therapeutic Activities and Exercises:   PT Eval complete    Patient left up in chair with all lines intact, call button in reach and nurse notified.    GOALS:    Physical Therapy Goals        Problem: Physical Therapy Goal    Goal Priority Disciplines Outcome Goal Variances Interventions   Physical Therapy Goal     PT/OT, PT Ongoing (interventions implemented as appropriate)     Description:  Goals to be met by: 18    Patient will increase functional independence with mobility by performin. Sit to stand transfer with Supervision  2. Gait  x 100 feet with Supervision using LRAD and w/b precautions   3. Ascend/descend 2 stair with bilateral Handrails Supervision using LRAD                    History:     Past Medical History:   Diagnosis Date    Allergy     CAD (coronary artery disease), native coronary artery 2013    Cancer     Diabetes mellitus     Diabetes mellitus, type 2     Disorder of kidney and ureter     Heart attack 2012    Hyperlipidemia      Hypertension     Muscular pain     post-op after colonoscopy       Past Surgical History:   Procedure Laterality Date    COLONOSCOPY N/A 2/17/2017    Procedure: COLONOSCOPY;  Surgeon: Simon Gomez MD;  Location: [facility] (4TH FLR);  Service: Endoscopy;  Laterality: N/A;    CORONARY ANGIOPLASTY      INCISION AND DRAINAGE FOOT Right 6/5/2018    Procedure: INCISION AND DRAINAGE, FOOT;  Surgeon: Bridget Santana DPM;  Location: [facility] OR 1ST FLR;  Service: Podiatry;  Laterality: Right;  Request mini C arm in room. request wound vac with medium black sponge    INCISION AND DRAINAGE FOOT Right 6/7/2018    Procedure: INCISION AND DRAINAGE, FOOT;  Surgeon: Emelia Brock DPM;  Location: [facility] OR 2ND FLR;  Service: Podiatry;  Laterality: Right;    INCISION AND DRAINAGE OF ABSCESS Right 6/2/2018    Procedure: INCISION AND DRAINAGE, ABSCESS;  Surgeon: Bridget Snatana DPM;  Location: [facility] OR 2ND FLR;  Service: General;  Laterality: Right;    REMOVAL OF FOREIGN BODY FROM FOOT Right 6/7/2018    Procedure: REMOVAL, FOREIGN BODY, FOOT;  Surgeon: Emelia Brock DPM;  Location: [facility] OR 2ND FLR;  Service: Podiatry;  Laterality: Right;    TONSILLECTOMY         Clinical Decision Making:     History  Co-morbidities and personal factors that may impact the plan of care Examination  Body Structures and Functions, activity limitations and participation restrictions that may impact the plan of care Clinical Presentation   Decision Making/ Complexity Score   Co-morbidities:   [] Time since onset of injury / illness / exacerbation  [] Status of current condition  []Patient's cognitive status and safety concerns    [] Multiple Medical Problems (see med hx)  Personal Factors:   [] Patient's age  [] Prior Level of function   [] Patient's home situation (environment and family support)  [] Patient's level of motivation  [] Expected progression of patient      HISTORY:(criteria)    [] 13681 - no personal factors/history    [] 69928 - has  1-2 personal factor/comorbidity     [] 90932 - has >3 personal factor/comorbidity     Body Regions:  [] Objective examination findings  [] Head     []  Neck  [] Trunk   [] Upper Extremity  [] Lower Extremity    Body Systems:  [] For communication ability, affect, cognition, language, and learning style: the assessment of the ability to make needs known, consciousness, orientation (person, place, and time), expected emotional /behavioral responses, and learning preferences (eg, learning barriers, education  needs)  [] For the neuromuscular system: a general assessment of gross coordinated movement (eg, balance, gait, locomotion, transfers, and transitions) and motor function  (motor control and motor learning)  [] For the musculoskeletal system: the assessment of gross symmetry, gross range of motion, gross strength, height, and weight  [] For the integumentary system: the assessment of pliability(texture), presence of scar formation, skin color, and skin integrity  [] For cardiovascular/pulmonary system: the assessment of heart rate, respiratory rate, blood pressure, and edema     Activity limitations:    [] Patient's cognitive status and saf ety concerns          [] Status of current condition      [] Weight bearing restriction  [] Cardiopulmunary Restriction    Participation Restrictions:   [] Goals and goal agreement with the patient     [] Rehab potential (prognosis) and probable outcome      Examination of Body System: (criteria)    [] 51922 - addressing 1-2 elements    [] 51560 - addressing a total of 3 or more elements     [] 12095 -  Addressing a total of 4 or more elements         Clinical Presentation: (criteria)  Choose one     On examination of body system using standardized tests and measures patient presents with (CHOOSE ONE) elements from any of the following: body structures and functions, activity limitations, and/or participation restrictions.  Leading to a clinical presentation that is considered  (CHOOSE ONE)                              Clinical Decision Making  (Eval Complexity):  Low- 42490     Time Tracking:     PT Received On: 06/08/18  PT Start Time: 0920     PT Stop Time: 0930  PT Total Time (min): 10 min     Billable Minutes: Evaluation 10 Min      Felice Wilkins, PT  06/08/2018

## 2018-06-08 NOTE — ASSESSMENT & PLAN NOTE
S/p right foot I&D, surgical site with serous drainage.   Left second toe with dusky appearance will continue to monitor  S/p angio per interventional cardiology, patient with 3 vessel runoff. Appreciate assistance.   Repeat xray right foot ordered, reviewed noting small piece <1mm  residual foreign body right foot.   Plan to monitor closely with wound VAC application likely Monday or Tuesday   Aerobic, anaerobic culture obtained   Abx per ID    Abx per ID appreciate assistance.   Podiatry will follow      Weightbearing Status: Partial heel WB right   Offloading Device: DARCO shoe     Tonya Valle DPM PGY-3  Pager: [phone]

## 2018-06-08 NOTE — PT/OT/SLP EVAL
Occupational Therapy   Evaluation and Discharge Note    Name: Billy Sheffield Miguel  MRN: [id number]  Admitting Diagnosis:  Diabetic ulcer of right midfoot 1 Day Post-Op    Recommendations:     Discharge Recommendations: home with home health  Discharge Equipment Recommendations:  cane, straight, walker, rolling  Barriers to discharge:  None    History:     Occupational Profile:  Living Environment: Pt lives with spouse in 1 story house with 2 steps to enter   Previous level of function: Independent   Roles and Routines:Pt works as a  and as a hospital sitter for prisoners   Equipment Owned:  none  Assistance upon Discharge: *wife able to assist     Past Medical History:   Diagnosis Date    Allergy     CAD (coronary artery disease), native coronary artery 6/25/2013    Cancer     Diabetes mellitus     Diabetes mellitus, type 2     Disorder of kidney and ureter     Heart attack 04/2012    Hyperlipidemia     Hypertension     Muscular pain     post-op after colonoscopy       Past Surgical History:   Procedure Laterality Date    COLONOSCOPY N/A 2/17/2017    Procedure: COLONOSCOPY;  Surgeon: Simon Gomez MD;  Location: [facility] (4TH [facility]);  Service: Endoscopy;  Laterality: N/A;    CORONARY ANGIOPLASTY      INCISION AND DRAINAGE FOOT Right 6/5/2018    Procedure: INCISION AND DRAINAGE, FOOT;  Surgeon: Bridget Santana DPM;  Location: [facility] OR [address] [place] [address];  Service: Podiatry;  Laterality: Right;  Request mini C arm in room. request wound vac with medium black sponge    INCISION AND DRAINAGE FOOT Right 6/7/2018    Procedure: INCISION AND DRAINAGE, FOOT;  Surgeon: Emelia Brock DPM;  Location: [facility] OR 2ND [facility];  Service: Podiatry;  Laterality: Right;    INCISION AND DRAINAGE OF ABSCESS Right 6/2/2018    Procedure: INCISION AND DRAINAGE, ABSCESS;  Surgeon: Bridget Santana DPM;  Location: [address];  Service: General;  Laterality: Right;    REMOVAL OF FOREIGN BODY FROM FOOT Right 6/7/2018    Procedure:  REMOVAL, FOREIGN BODY, FOOT;  Surgeon: Emelia Brock DPM;  Location: [facility] OR [address] [place] [address];  Service: Podiatry;  Laterality: Right;    TONSILLECTOMY         Subjective     Chief Complaint: no complaints   Patient/Family stated goals: return to home   Communicated with: RB prior to session.  Pain/Comfort:  · Pain Rating 1: 0/10    Patients cultural, spiritual, Roman Catholic conflicts given the current situation:      Objective:     Patient found with:  (all lines intact )    General Precautions: Standard, fall   Orthopedic Precautions:RLE partial weight bearing   Braces: N/A     Occupational Performance:    Bed Mobility:    · Independent bed mobility     Functional Mobility/Transfers:  Functional Mobility: Pt able to demonstrate mobility with weight bearing precautions   Activities of Daily Living:  · Pt able to complete self care tasks     Cognitive/Visual Perceptual:  Cognitive/Psychosocial Skills:     -       Oriented to: Person, Place, Time and Situation   -       Follows Commands/attention:Follows two-step commands  -       Communication: clear/fluent  -       Memory: No Deficits noted  -       Safety awareness/insight to disability: intact   -       Mood/Affect/Coping skills/emotional control: Appropriate to situation    Physical Exam:  Upper Extremity Range of Motion:     -       Right Upper Extremity: WFL  -       Left Upper Extremity: WFL  Upper Extremity Strength:    -       Right Upper Extremity: WFL  -       Left Upper Extremity: WFL    Patient left up in chair with all lines intact    AMPAC 6 Click:  AMPAC Total Score: 24    Treatment & Education:  Evaluation complete.  Pt educated on safety, role of OT, importance of increased participation in self care for gains , expectations for participation, expectations for gains, POC, energy conservation, caregiver strain. White board updated.     Education:    Assessment:     Billy Rivera is a 52 y.o. male with a medical diagnosis of Diabetic ulcer of right  "midfoot. At this time, patient is functioning at their prior level of function and does not require further acute OT services.     Clinical Decision Makin.  OT Low:  "Pt evaluation falls under low complexity for evaluation coding due to performance deficits noted in 1-3 areas as stated above and 0 co-morbities affecting current functional status. Data obtained from problem focused assessments. No modifications or assistance was required for completion of evaluation. Only brief occupational profile and history review completed."     Plan:     During this hospitalization, patient does not require further acute OT services.  Please re-consult if situation changes.    · Plan of Care Reviewed with: patient    This Plan of care has been discussed with the patient who was involved in its development and understands and is in agreement with the identified goals and treatment plan    GOALS:    Occupational Therapy Goals     Not on file          Multidisciplinary Problems (Resolved)        Problem: Occupational Therapy Goal    Goal Priority Disciplines Outcome Interventions   Occupational Therapy Goal   (Resolved)     OT, PT/OT Outcome(s) achieved                    Time Tracking:     OT Date of Treatment: 18  OT Start Time: 922  OT Stop Time: 935  OT Total Time (min): 13 min    Billable Minutes:Evaluation 13    Liana Llanes OT  2018    "

## 2018-06-08 NOTE — PROGRESS NOTES
[facility]  Podiatry  Progress Note    Patient Name: Billy Sheffield Miguel  MRN: [id number]  Admission Date: 6/1/2018  Hospital Length of Stay: 7 days  Attending Physician: Josiah Sawyer, *  Primary Care Provider: BRAD Baker MD   Interval Hx: S/p  right foot I&D DOS:6/2/18, 6/5/18 and 6/7/18 . Reports pain improved well controlled with pain meds. Removed large piece of glass from wound however  xrays reviewed noting residual foreign body in foot.     Scheduled Meds:   atorvastatin  80 mg Oral Daily    carvedilol  12.5 mg Oral BID    ceFAZolin (ANCEF) IVPB  2 g Intravenous Q8H    enoxaparin  40 mg Subcutaneous Daily    insulin aspart U-100  20 Units Subcutaneous TIDWM    insulin detemir U-100  55 Units Subcutaneous QHS    Lactobacillus rhamnosus GG  1 capsule Oral Daily    lisinopril  40 mg Oral Daily    meperidine  12.5 mg Intravenous Once    psyllium husk (aspartame)  1 packet Oral Daily    sodium chloride 0.9%  300 mL Intravenous Once     Continuous Infusions:  PRN Meds:acetaminophen, dextrose 50%, dextrose 50%, glucagon (human recombinant), glucose, glucose, insulin aspart U-100, ondansetron, oxyCODONE, sodium chloride 0.9%, sodium chloride 0.9%, sodium chloride 0.9%    Review of patient's allergies indicates:   Allergen Reactions    Penicillins Anaphylaxis    Shellfish containing products      Other reaction(s): Unknown    Vancomycin Itching    Bactrim  [sulfamethoxazole-trimethoprim] Rash        Past Medical History:   Diagnosis Date    Allergy     CAD (coronary artery disease), native coronary artery 6/25/2013    Cancer     Diabetes mellitus     Diabetes mellitus, type 2     Disorder of kidney and ureter     Heart attack 04/2012    Hyperlipidemia     Hypertension     Muscular pain     post-op after colonoscopy     Past Surgical History:   Procedure Laterality Date    COLONOSCOPY N/A 2/17/2017    Procedure: COLONOSCOPY;  Surgeon: Simon Gomez MD;   Location: [facility] ENDO (4TH FLR);  Service: Endoscopy;  Laterality: N/A;    CORONARY ANGIOPLASTY      INCISION AND DRAINAGE FOOT Right 6/5/2018    Procedure: INCISION AND DRAINAGE, FOOT;  Surgeon: Bridget Santana DPM;  Location: [facility] OR 1ST FLR;  Service: Podiatry;  Laterality: Right;  Request mini C arm in room. request wound vac with medium black sponge    INCISION AND DRAINAGE FOOT Right 6/7/2018    Procedure: INCISION AND DRAINAGE, FOOT;  Surgeon: Emelia Brock DPM;  Location: [facility] OR 2ND FLR;  Service: Podiatry;  Laterality: Right;    INCISION AND DRAINAGE OF ABSCESS Right 6/2/2018    Procedure: INCISION AND DRAINAGE, ABSCESS;  Surgeon: Bridget Santana DPM;  Location: [facility] OR 2ND FLR;  Service: General;  Laterality: Right;    REMOVAL OF FOREIGN BODY FROM FOOT Right 6/7/2018    Procedure: REMOVAL, FOREIGN BODY, FOOT;  Surgeon: Emelia Brock DPM;  Location: [facility] OR 2ND FLR;  Service: Podiatry;  Laterality: Right;    TONSILLECTOMY         Family History     Problem Relation (Age of Onset)    Heart disease Mother, Brother        Social History Main Topics    Smoking status: Never Smoker    Smokeless tobacco: Never Used    Alcohol use Yes      Comment: rare x1 beer-     Drug use: No    Sexual activity: Not Currently     Review of Systems   Constitutional: Positive for activity change.   Respiratory: Negative for shortness of breath.    Cardiovascular: Negative for chest pain and leg swelling.   Gastrointestinal: Negative for nausea and vomiting.   Genitourinary: Negative.    Musculoskeletal: Positive for arthralgias.   Skin: Positive for wound.   Neurological: Negative for weakness and numbness.   Psychiatric/Behavioral: Negative.      Objective:     Vital Signs (Most Recent):  Temp: 99.1 °F (37.3 °C) (06/08/18 1536)  Pulse: (!) 59 (06/08/18 1536)  Resp: 18 (06/08/18 1536)  BP: 107/71 (06/08/18 1536)  SpO2: 96 % (06/08/18 1536) Vital Signs (24h Range):  Temp:  [98.1 °F (36.7 °C)-100.1 °F (37.8 °C)] 99.1  °F (37.3 °C)  Pulse:  [55-77] 59  Resp:  [16-20] 18  SpO2:  [93 %-98 %] 96 %  BP: (107-135)/(64-84) 107/71     Weight: 122.5 kg (270 lb)  Body mass index is 35.62 kg/m².    Foot Exam    General  Orientation: alert and oriented to person, place, and time       Right Foot/Ankle     Inspection and Palpation  Tenderness: metatarsals   Swelling: metatarsals   Skin Exam: ulcer;     Neurovascular  Dorsalis pedis: 1+  Posterior tibial: 1+      Left Foot/Ankle      Inspection and Palpation  Tenderness: none   Swelling: none   Skin Exam: erythema;     Neurovascular  Dorsalis pedis: 1+  Posterior tibial: 1+          6/6/18    Wound 1:Plantar right foot   Measurement: 5cmx0.2cmx0.5cm  Base: fibrous   Periwound skin: macerated tissue -   Drainage: serous  Erythema: continued erythema noted to right foot.   Probe: negatetive probe to bone.     Left second toe with dusky appearance- improved.     Wound 1: Right dorsal foot.   Measurement:2 incisions one at base of 2nd-- 0.5cmx0.5cmx0.5cm and 3rd toe. One at plantar foot-[id number] , 2nd interdigital space 1.0cmx0.5cmx0.1cm.    Base: fibrous base   Periwound skin: erythema with maceration.   Drainage: serous, exam limited 2/2 to pain.   Erythema: moderate   Probe: none, no bone exposed            Gentian violet applied to right plantar foot.                           Laboratory:  CBC:     Recent Labs  Lab 06/08/18  0521   WBC 10.37   RBC 3.86*   HGB 11.3*   HCT 34.1*      MCV 88   MCH 29.3   MCHC 33.1     CMP:     Recent Labs  Lab 06/08/18  0521   *   CALCIUM 8.3*   ALBUMIN 2.0*   PROT 6.4   *   K 4.7   CO2 25   CL 99   BUN 11   CREATININE 0.9   ALKPHOS 103   ALT 27   AST 33   BILITOT 0.3       Diagnostic Results:  Xray: There is DJD and spurs on the calcaneus.  There is a foreign body soft tissues below in between the 1st and 2nd metatarsals thought to be a shard of glass.  There may be soft tissue swelling.  No fracture dislocation bone destruction  seen.    MRI: No MR evidence of osteomyelitis.    Diffuse subcutaneous and myofascial forefoot edema with multiple plantar skin defects, overall, concerning for cellulitis/myositis.  Associated radiopaque foreign body likely reflecting glass is more conspicuous on comparison foot radiograph.    Nonspecific dermal fluid collection along the plantar aspect of the 1st and 2nd metatarsophalangeal joints, possibly a blister.  An infected fluid collection cannot be excluded.  Suggest correlation with direct visualization and clinical findings.    Clinical Findings:  S/p  right foot I&D. Surgical site stable with serous drainage. Pain upon palpation right foot.     Assessment/Plan:     * Diabetic ulcer of right midfoot    S/p right foot I&D, surgical site with serous drainage.   Left second toe with dusky appearance will continue to monitor  S/p angio per interventional cardiology, patient with 3 vessel runoff. Appreciate assistance.   Repeat xray right foot ordered, reviewed noting small piece <1mm  residual foreign body right foot.   Plan to monitor closely with wound VAC application likely Monday or Tuesday   Aerobic, anaerobic culture obtained   Abx per ID    Abx per ID appreciate assistance.   Podiatry will follow      Weightbearing Status: Partial heel WB right   Offloading Device: DARCO shoe     Tonya Valle DPM PGY-3  Pager: [phone]              Foreign body in right foot    S/p right foot I&D x3. Large piece of glass removed from right  foot. Residual piece of glass noted negligible in size <1mm.         Right foot infection    Per above         Type 2 diabetes mellitus 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, with long-term current use of insulin    Per primary             Tonya Valle MD  Podiatry  [facility]

## 2018-06-08 NOTE — ANESTHESIA POSTPROCEDURE EVALUATION
"Anesthesia Post Evaluation    Patient: Billy Rivera    Procedure(s) Performed: Procedure(s) (LRB):  INCISION AND DRAINAGE, FOOT (Right)  REMOVAL, FOREIGN BODY, FOOT (Right)    Final Anesthesia Type: general  Patient location during evaluation: PACU  Patient participation: Yes- Able to Participate  Level of consciousness: awake and alert  Post-procedure vital signs: reviewed and stable  Pain management: adequate  Airway patency: patent  PONV status at discharge: No PONV  Anesthetic complications: no      Cardiovascular status: blood pressure returned to baseline  Respiratory status: unassisted  Hydration status: euvolemic  Follow-up not needed.        Visit Vitals  /76 (BP Location: Left arm, Patient Position: Lying)   Pulse 65   Temp 37.8 °C (100.1 °F) (Oral)   Resp 20   Ht 6' 1" (1.854 m)   Wt 122.5 kg (270 lb)   SpO2 (!) 93%   BMI 35.62 kg/m²       Pain/Gabriel Score: Pain Assessment Performed: Yes (6/7/2018  9:00 PM)  Presence of Pain: complains of pain/discomfort (6/7/2018  9:00 PM)  Pain Rating Prior to Med Admin: 6 (6/8/2018  4:44 AM)  Pain Rating Post Med Admin: 2 (6/8/2018  5:44 AM)  Gabriel Score: 10 (6/7/2018  6:00 PM)      "

## 2018-06-08 NOTE — ANESTHESIA RELEASE NOTE
"Anesthesia Release from PACU Note    Patient: Billy Rivera    Procedure(s) Performed: Procedure(s) (LRB):  INCISION AND DRAINAGE, FOOT (Right)  REMOVAL, FOREIGN BODY, FOOT (Right)    Anesthesia type: general    Post pain: Adequate analgesia    Post assessment: no apparent anesthetic complications    Last Vitals:   Visit Vitals  /76 (BP Location: Left arm, Patient Position: Lying)   Pulse 65   Temp 37.8 °C (100.1 °F) (Oral)   Resp 20   Ht 6' 1" (1.854 m)   Wt 122.5 kg (270 lb)   SpO2 (!) 93%   BMI 35.62 kg/m²       Post vital signs: stable    Level of consciousness: awake, alert  and oriented    Nausea/Vomiting: no nausea/no vomiting    Complications: none    Airway Patency: patent    Respiratory: unassisted    Cardiovascular: stable and blood pressure at baseline    Hydration: euvolemic  "

## 2018-06-08 NOTE — ASSESSMENT & PLAN NOTE
- Initially presented with diarrhea, likely side effect of PO abx outpatient. No BM from day of admission to day 4, likely opiate-induced ileus. Recurrence of diarrhea on day 5, again likely side effect of abx use inpatient. Low suspicion for c diff, given mild leukocytosis, spontaneous resolution/improvement, and lack of abdominal pain.  - Currently controlled with no BM since 2 days ago.  - Continue probiotics.  - Holding bowel regimen.

## 2018-06-09 PROBLEM — D72.829 LEUKOCYTOSIS: Status: RESOLVED | Noted: 2018-06-01 | Resolved: 2018-06-09

## 2018-06-09 PROBLEM — R50.9 FEVER: Status: RESOLVED | Noted: 2018-06-04 | Resolved: 2018-06-09

## 2018-06-09 LAB
ALBUMIN SERPL BCP-MCNC: 1.9 G/DL
ALP SERPL-CCNC: 94 U/L
ALT SERPL W/O P-5'-P-CCNC: 21 U/L
ANION GAP SERPL CALC-SCNC: 8 MMOL/L
AST SERPL-CCNC: 29 U/L
BASOPHILS # BLD AUTO: 0.04 K/UL
BASOPHILS NFR BLD: 0.4 %
BILIRUB SERPL-MCNC: 0.3 MG/DL
BUN SERPL-MCNC: 10 MG/DL
CALCIUM SERPL-MCNC: 8.3 MG/DL
CHLORIDE SERPL-SCNC: 99 MMOL/L
CO2 SERPL-SCNC: 26 MMOL/L
CREAT SERPL-MCNC: 0.9 MG/DL
DIFFERENTIAL METHOD: ABNORMAL
EOSINOPHIL # BLD AUTO: 0.2 K/UL
EOSINOPHIL NFR BLD: 2.5 %
ERYTHROCYTE [DISTWIDTH] IN BLOOD BY AUTOMATED COUNT: 12.1 %
EST. GFR  (AFRICAN AMERICAN): >60 ML/MIN/1.73 M^2
EST. GFR  (NON AFRICAN AMERICAN): >60 ML/MIN/1.73 M^2
GLUCOSE SERPL-MCNC: 186 MG/DL
HCT VFR BLD AUTO: 33.6 %
HGB BLD-MCNC: 11.2 G/DL
IMM GRANULOCYTES # BLD AUTO: 0.21 K/UL
IMM GRANULOCYTES NFR BLD AUTO: 2.3 %
LYMPHOCYTES # BLD AUTO: 1.4 K/UL
LYMPHOCYTES NFR BLD: 15.9 %
MAGNESIUM SERPL-MCNC: 1.5 MG/DL
MCH RBC QN AUTO: 29 PG
MCHC RBC AUTO-ENTMCNC: 33.3 G/DL
MCV RBC AUTO: 87 FL
MONOCYTES # BLD AUTO: 1 K/UL
MONOCYTES NFR BLD: 11.3 %
NEUTROPHILS # BLD AUTO: 6.1 K/UL
NEUTROPHILS NFR BLD: 67.6 %
NRBC BLD-RTO: 0 /100 WBC
PHOSPHATE SERPL-MCNC: 4.3 MG/DL
PLATELET # BLD AUTO: 284 K/UL
PMV BLD AUTO: 9.5 FL
POCT GLUCOSE: 141 MG/DL (ref 70–110)
POCT GLUCOSE: 169 MG/DL (ref 70–110)
POCT GLUCOSE: 202 MG/DL (ref 70–110)
POCT GLUCOSE: 205 MG/DL (ref 70–110)
POTASSIUM SERPL-SCNC: 4.2 MMOL/L
PROT SERPL-MCNC: 6.2 G/DL
RBC # BLD AUTO: 3.86 M/UL
SODIUM SERPL-SCNC: 133 MMOL/L
WBC # BLD AUTO: 9.05 K/UL

## 2018-06-09 PROCEDURE — 63600175 PHARM REV CODE 636 W HCPCS: Performed by: STUDENT IN AN ORGANIZED HEALTH CARE EDUCATION/TRAINING PROGRAM

## 2018-06-09 PROCEDURE — 63600175 PHARM REV CODE 636 W HCPCS: Performed by: PHYSICIAN ASSISTANT

## 2018-06-09 PROCEDURE — 25000003 PHARM REV CODE 250: Performed by: STUDENT IN AN ORGANIZED HEALTH CARE EDUCATION/TRAINING PROGRAM

## 2018-06-09 PROCEDURE — 85025 COMPLETE CBC W/AUTO DIFF WBC: CPT

## 2018-06-09 PROCEDURE — 11000001 HC ACUTE MED/SURG PRIVATE ROOM

## 2018-06-09 PROCEDURE — 80053 COMPREHEN METABOLIC PANEL: CPT

## 2018-06-09 PROCEDURE — 76937 US GUIDE VASCULAR ACCESS: CPT

## 2018-06-09 PROCEDURE — 36569 INSJ PICC 5 YR+ W/O IMAGING: CPT

## 2018-06-09 PROCEDURE — C1751 CATH, INF, PER/CENT/MIDLINE: HCPCS

## 2018-06-09 PROCEDURE — 84100 ASSAY OF PHOSPHORUS: CPT

## 2018-06-09 PROCEDURE — 02HV33Z INSERTION OF INFUSION DEVICE INTO SUPERIOR VENA CAVA, PERCUTANEOUS APPROACH: ICD-10-PCS | Performed by: HOSPITALIST

## 2018-06-09 PROCEDURE — 83735 ASSAY OF MAGNESIUM: CPT

## 2018-06-09 PROCEDURE — 99233 SBSQ HOSP IP/OBS HIGH 50: CPT | Mod: ,,, | Performed by: NURSE PRACTITIONER

## 2018-06-09 PROCEDURE — 99233 SBSQ HOSP IP/OBS HIGH 50: CPT | Mod: ,,, | Performed by: HOSPITALIST

## 2018-06-09 PROCEDURE — 36415 COLL VENOUS BLD VENIPUNCTURE: CPT

## 2018-06-09 RX ORDER — AMOXICILLIN 250 MG
1 CAPSULE ORAL DAILY
Status: DISCONTINUED | OUTPATIENT
Start: 2018-06-09 | End: 2018-06-15 | Stop reason: HOSPADM

## 2018-06-09 RX ORDER — MAGNESIUM SULFATE HEPTAHYDRATE 40 MG/ML
2 INJECTION, SOLUTION INTRAVENOUS ONCE
Status: COMPLETED | OUTPATIENT
Start: 2018-06-09 | End: 2018-06-09

## 2018-06-09 RX ADMIN — CEFAZOLIN 2 G: 1 INJECTION, POWDER, FOR SOLUTION INTRAMUSCULAR; INTRAVENOUS at 05:06

## 2018-06-09 RX ADMIN — ENOXAPARIN SODIUM 40 MG: 100 INJECTION SUBCUTANEOUS at 05:06

## 2018-06-09 RX ADMIN — STANDARDIZED SENNA CONCENTRATE AND DOCUSATE SODIUM 1 TABLET: 8.6; 5 TABLET, FILM COATED ORAL at 12:06

## 2018-06-09 RX ADMIN — CARVEDILOL 12.5 MG: 12.5 TABLET, FILM COATED ORAL at 10:06

## 2018-06-09 RX ADMIN — LISINOPRIL 40 MG: 20 TABLET ORAL at 10:06

## 2018-06-09 RX ADMIN — Medication 1 CAPSULE: at 10:06

## 2018-06-09 RX ADMIN — INSULIN ASPART 20 UNITS: 100 INJECTION, SOLUTION INTRAVENOUS; SUBCUTANEOUS at 10:06

## 2018-06-09 RX ADMIN — MAGNESIUM SULFATE IN WATER 2 G: 40 INJECTION, SOLUTION INTRAVENOUS at 10:06

## 2018-06-09 RX ADMIN — PSYLLIUM HUSK 1 PACKET: 3.4 POWDER ORAL at 10:06

## 2018-06-09 RX ADMIN — INSULIN ASPART 20 UNITS: 100 INJECTION, SOLUTION INTRAVENOUS; SUBCUTANEOUS at 05:06

## 2018-06-09 RX ADMIN — INSULIN ASPART 3 UNITS: 100 INJECTION, SOLUTION INTRAVENOUS; SUBCUTANEOUS at 10:06

## 2018-06-09 RX ADMIN — CEFAZOLIN 2 G: 1 INJECTION, POWDER, FOR SOLUTION INTRAMUSCULAR; INTRAVENOUS at 10:06

## 2018-06-09 RX ADMIN — INSULIN ASPART 20 UNITS: 100 INJECTION, SOLUTION INTRAVENOUS; SUBCUTANEOUS at 12:06

## 2018-06-09 RX ADMIN — CEFAZOLIN 2 G: 1 INJECTION, POWDER, FOR SOLUTION INTRAMUSCULAR; INTRAVENOUS at 01:06

## 2018-06-09 RX ADMIN — INSULIN DETEMIR 55 UNITS: 100 INJECTION, SOLUTION SUBCUTANEOUS at 10:06

## 2018-06-09 RX ADMIN — ATORVASTATIN CALCIUM 80 MG: 20 TABLET, FILM COATED ORAL at 10:06

## 2018-06-09 RX ADMIN — INSULIN ASPART 4 UNITS: 100 INJECTION, SOLUTION INTRAVENOUS; SUBCUTANEOUS at 12:06

## 2018-06-09 NOTE — PROGRESS NOTES
[facility] Hospital Medicine  Progress Note    Patient Name: Billy Sheffield Miguel  MRN: [id number]  Patient Class: IP- Inpatient   Admission Date: 6/1/2018  Length of Stay: 8 days  Attending Physician: Josiah Sawyer, *  Primary Care Provider: BRAD Baker MD    [facility] Medicine Team: [facility] MED 2 Millicent Torre MD    Subjective:     Principal Problem:Diabetic ulcer of right midfoot    HPI:  Patient is a 52-year-old male with HTN, MI 2013 s/p 1 stent, DMT2, colon cancer s/p resection 2017 who presents after failing outpatient treatment for a right ulcer.  Patient states that he stepped on a piece of glass at work where he is  on the 19th of May and it went through his shoe. At that time he removed the glass, used antiseptic and antibacterial on his foot and felt that things are going OK. He didn't have any issues until last Saturday The 26th, when he noted that a puffy cherry blister was occurring on his plantar surface near the first digit. On Tuesday, the 29th he presented to his primary care physician in [place] where he was started on clinda and cipro. Patient notes that the clinda and Cipro gave him diarrhea and vomiting. He stated that he had diarrhea three times a day with some form, mostly liquid, no blood and it has been the same since it started. His vomiting occurred five times yesterday, and was brownish with food. His last diarrhea episode was at 10:30 this morning, and vomiting at 4:30 in the afternoon. The patient presented to his outside PCP today, without relief of symptoms on antibiotics. Patient denies fever, chills, nausea, vomiting, diarrhea, dysuria, rash, shortness of breath, chest pain, abdominal pain.  Patient states that his diabetes started 15 to 20 years ago, and has been reasonably well controlled since then with no neuropathy, optic issues or kidney issues. Patient notes that he had a stent in 2013 for coronary artery disease, and colon cancer  that was resected in April 2017 without any issues on follow up.    Hospital Course:  6/1/2018- admission date. Empirically started on daptomycin due to PCN allergy, aztreonam, flagyl for anaerobe coverage. Diarrhea resolved on admission, thought to be side effect of outpatient abx rather than c diff.  6/2/2018- Received OR debridement by podiatry. Tolerated procedure well. Had fever of 101.9 overnight. WBC persistently ~13. VASILIY 1.14 in LLE. RLE VASILIY unable to be obtained due to wound/infection.  6/3/2018- Aerobic wound culture grew strep agalactiae. Per ID recs, abx switched to cefazolin with no adverse effects. Fever of 101.4. Repeat XR of foot showed residual piece of glass in foot.  06/04/2018: Had not had any BM since admission. Was started on bowel regimen. However, pt start having diarrhea again, 4 episodes during the day, before receiving any bowel regimen. Lactulose and miralax were not given. Pt received 1 dose of senna-docusate overnight. Had 1 more episode of diarrhea overnight. Persistent hyponatremia with Na+ in low 130s. Urine studies showed low-normal urine sodium and normal osmolality. Fever of 101 recurred for 8 hours, resolved with tylenol. Pt denied symptoms during febrile episode. WBC slightly improved, left shift persisted. Thought to be due to residual foreign object embedded in foot.  06/05/2018: Recurrence of diarrhea thought to be secondary to antibiotics, given similar presentation while pt was previously on PO abx prior to admission. Started on probiotics. Stool became formed later in the day. Went to OR again for removal of remaining piece of glass in foot. Found to have purulent drainage from wound, which was debrided. However, glass was found to be too deeply embedded in tissue and was unable to be retrieved despite multiple attempts. Post op XR of foot showed soft tissue swelling and gas. Immediately post op, pt's fever recurred with temp up to 102 for 8 hours overnight.  6/6/2018:  reports fever and fatigue. Denies chills, myalgia, cough, sore throat, dysuria, WBC stable at 13. Denies foot pain or bleeding. Hgb stable. Right second toe noted to be blue/pale on exam. Thought to be bruising from pressure applied on toe in OR during debridement of foot vs ischemia (given unknown RLE VASILIY). Monitored for swelling, per podiatry recs.  06/07/2018: had fever of 101.1 F overnight that resolved with 1 dose of PO tylenol. VS otherwise stable. Pain controlled with oxycodone IR 5 mg x 1. WBC improved to 10. No BM since I/D two days ago. Went to OR again for I/D in attempt to remove residual piece of glass in wound, which successfully removed large piece of glass. Wound cultures were sent. Unfortunately, there was one small piece (<1 mm in size) residual in wound, which was confirmed on XR post op. Underwent BLE arterial angiogram for ischemic evaluation which showed 3 vessel run off.  06/08/2018: remained afebrile. WBC remained normal. Per ID recs, continued on IV abx for 6 week duration for presumed osteomyelitis, given proximity of foreign object/extent of wound to bone. PICC line was placed, confirmed by CXR. No BM since 6/5/18. Started on metamucil.  06/09/2018: remained afebrile with normal WBC. Blood cultures from 6/2/18 and 6/6/18 remained negative. Pain controlled with prn opiates. Started on senna-doc.      Interval History:   NAEON. Denies fever, pain. Reports last BM was 6/5/18. Does not feel the need to have a BM, denies straining. Passing flatus. Eating 3 meals a day.    Review of Systems   Constitutional: Negative for chills, fever and unexpected weight change.   HENT: Negative for hearing loss.    Eyes: Negative for visual disturbance.   Respiratory: Negative for cough and shortness of breath.    Cardiovascular: Negative for chest pain, palpitations and leg swelling.   Gastrointestinal: Positive for constipation. Negative for abdominal distention, abdominal pain, blood in stool, diarrhea,  nausea, rectal pain and vomiting.   Genitourinary: Negative for dysuria and hematuria.   Musculoskeletal: Negative for arthralgias.   Skin: Positive for color change, rash and wound.   Neurological: Negative for seizures, weakness and headaches.   Hematological: Negative for adenopathy. Does not bruise/bleed easily.     Objective:     Vital Signs (Most Recent):  Temp: 97.4 °F (36.3 °C) (06/09/18 1125)  Pulse: 66 (06/09/18 1125)  Resp: 18 (06/09/18 1125)  BP: 129/68 (06/09/18 1125)  SpO2: (!) 92 % (06/09/18 1125) Vital Signs (24h Range):  Temp:  [97.4 °F (36.3 °C)-99.3 °F (37.4 °C)] 97.4 °F (36.3 °C)  Pulse:  [57-69] 66  Resp:  [16-18] 18  SpO2:  [92 %-96 %] 92 %  BP: (106-130)/(50-71) 129/68     Weight: 122.5 kg (270 lb)  Body mass index is 35.62 kg/m².  No intake or output data in the 24 hours ending 06/09/18 1504     Physical Exam   Constitutional: He is oriented to person, place, and time. He appears well-developed and well-nourished. No distress.   HENT:   Head: Normocephalic and atraumatic.   Eyes: Conjunctivae, EOM and lids are normal.   Neck: No tracheal deviation present. No thyromegaly present.   Cardiovascular: Normal rate, regular rhythm, normal heart sounds and intact distal pulses.    No murmur heard.  Pulmonary/Chest: Effort normal and breath sounds normal. No respiratory distress. He has no wheezes. He has no rales.   Abdominal: Soft. He exhibits no distension and no mass. There is no tenderness. No hernia.   Musculoskeletal: He exhibits tenderness. He exhibits no edema.   Right foot bandage, intact, clean, dry. Point tenderness on sole of foot at location of wound. R second toe with purple discoloration and decreased strength/ROM compared to other toes.   Neurological: He is alert and oriented to person, place, and time. No cranial nerve deficit. He exhibits normal muscle tone.   Skin: Skin is warm and dry. Rash noted. He is not diaphoretic. There is erythema.   RLE erythema up to mid shin, no change  from marked border. Non tender, non edematous.   Psychiatric: He has a normal mood and affect.   Vitals reviewed.    Significant Labs: All pertinent labs within the past 24 hours have been reviewed.  CMP:   6/9/2018 04:12   Sodium 133 (L)   Potassium 4.2   Chloride 99   CO2 26   Anion Gap 8   BUN, Bld 10   Creatinine 0.9   eGFR if non African American >60.0   eGFR if African American >60.0   Glucose 186 (H)   Calcium 8.3 (L)   Phosphorus 4.3   Magnesium 1.5 (L)   Alkaline Phosphatase 94   Total Protein 6.2   Albumin 1.9 (L)   Total Bilirubin 0.3   AST 29   ALT 21     CBC:   6/9/2018 04:13   WBC 9.05   RBC 3.86 (L)   Hemoglobin 11.2 (L)   Hematocrit 33.6 (L)   MCV 87   MCH 29.0   MCHC 33.3   RDW 12.1   Platelets 284   MPV 9.5   Gran% 67.6   Gran # (ANC) 6.1   Immature Granulocytes 2.3 (H)   Immature Grans (Abs) 0.21 (H)   Lymph% 15.9 (L)   Lymph # 1.4   Mono% 11.3   Mono # 1.0   Eosinophil% 2.5   Eos # 0.2   Basophil% 0.4   Baso # 0.04   nRBC 0     DM panel:   6/8/2018 13:38 6/8/2018 18:00 6/8/2018 21:09 6/9/2018 01:53 6/9/2018 10:11 6/9/2018 12:23   POCT Glucose 114 (H) 175 (H) 176 (H) 202 (H) 141 (H) 205 (H)     Significant Imaging: I have reviewed and interpreted all pertinent imaging results/findings within the past 24 hours.   No new imaging.    Assessment/Plan:      * Diabetic ulcer of right midfoot    Cellulitis  R foot wound with foreign object (glass)  - S/p OR debridement by podiatry on day 2. Aerobic wound culture on 6/2/18 grew strep agalactiae. Anaerobic culture pending. No signs of osteomyelitis. Blood culture NGTD. WBC persistently 13. Repeat XR foot showed residual piece of glass in wound. Developed recurrent fevers with temp up to 102 F. Blood cultures from 6/2/18 showed no growth. Multiple unsuccessful attempts to retrieve remaining piece of glass in foot in OR on day 5. Continued to have fever afterwards, consistent with lack of source control. Blood cultures on 6/6/18 negative. Repeat I/D on day 7  had successful removal of large piece of residual glass. However, one tiny residual piece remained, confirmed by XR post op. Fever and WBC resolved afterwards.  - Cellulitis of R leg improving.  - Continue cefazolin 2 g IV q8h for 6 week course, per ID recs, for osteomyelitis coverage, given proximity of foreign object to bone (started on day 3). S/p aztreonam, flagyl, daptomycin initially. PICC line placed on 6/8/18 for prolonged abx course.  - Wound culture 6/7/18 pending.  - Continue oxycodone IR 5 mg prn for pain.  - Plan to discharge home with wound vac, [employer] shoe for partial heel WB.        Type 2 diabetes mellitus 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, with long-term current use of insulin    - Uncontrolled outpatient with hgb A1C of 12, 's, and anion gap 14 on admission (previously 9.7 in 2/2018). On insulin glargine 55 units, aspart 20 units TIDWM, metformin, trulicity at home.  - BG currently [id number] on home insulin regimen.  - Continue insulin detemir 55 units daily.  - Continue insulin aspart 20 units TIDWM.  - Continue SSI.  - POCT glucose AC & HS.  - Holding home metformin and trulicity while in house.        HTN (hypertension)    - On lisinopril 40 mg daily and carvedilol 12.5 mg BID at home.  - SBP currently low 100s-130s, at goal.  - Continue home ACE-I.  - Continue home BB.  - Monitor.        Diarrhea    Constipation  - Initially presented with diarrhea, likely side effect of PO abx outpatient. No BM from day of admission to day 4, likely opiate-induced ileus. Recurrence of diarrhea on day 5, again likely side effect of abx use inpatient. Low suspicion for c diff, given mild leukocytosis, spontaneous resolution/improvement, and lack of abdominal pain.  - Currently constipated with no BM since 6/5/18. Suspect ileus in the setting of opiate use post op.  - Continue probiotics.  - Continue metamucil.  - Start senna-doc.        CAD s/p PCI of  LAD (SHELBY) in May 2013    - On ASA 81 mg daily and atorvastatin 80 mg daily at home.  - Currently stable. Asymptomatic.  - Continue home ASA.  - Continue home statin.        Dyslipidemia    - On atorvastatin 80 mg daily at home.  - Continue home statin.          VTE Risk Mitigation         Ordered     enoxaparin injection 40 mg  Daily      06/05/18 1706     IP VTE HIGH RISK PATIENT  Once      06/02/18 8144          Millicent Torre MD  Department of Hospital Medicine   [facility]

## 2018-06-09 NOTE — ASSESSMENT & PLAN NOTE
Constipation  - Initially presented with diarrhea, likely side effect of PO abx outpatient. No BM from day of admission to day 4, likely opiate-induced ileus. Recurrence of diarrhea on day 5, again likely side effect of abx use inpatient. Low suspicion for c diff, given mild leukocytosis, spontaneous resolution/improvement, and lack of abdominal pain.  - Currently constipated with no BM since 6/5/18. Suspect ileus in the setting of opiate use post op.  - Continue probiotics.  - Continue metamucil.  - Start senna-doc.

## 2018-06-09 NOTE — NURSING
Dressing/gauze/kerlix applied earlier this morning per podiatry, and remains intact without visible drainage.  PICC upper right arm intact.  Both ports flushed without difficulty.

## 2018-06-09 NOTE — ASSESSMENT & PLAN NOTE
- Uncontrolled outpatient with hgb A1C of 12, 's, and anion gap 14 on admission (previously 9.7 in 2/2018). On insulin glargine 55 units, aspart 20 units TIDWM, metformin, trulicity at home.  - BG currently [id number] on home insulin regimen.  - Continue insulin detemir 55 units daily.  - Continue insulin aspart 20 units TIDWM.  - Continue SSI.  - POCT glucose AC & HS.  - Holding home metformin and trulicity while in house.

## 2018-06-09 NOTE — PROCEDURES
"Billy Rivera is a 53 y.o. male patient.    Temp: 98.8 °F (37.1 °C) (06/09/18 0307)  Pulse: 98 (06/09/18 0804)  Resp: 16 (06/09/18 0307)  BP: 117/63 (06/09/18 0307)  SpO2: 95 % (06/09/18 0307)  Weight: 122.5 kg (270 lb) (06/01/18 2349)  Height: 6' 1" (185.4 cm) (06/01/18 2349)    PICC  Date/Time: 6/9/2018 9:42 AM  Performed by: RENE VIVEROS  Consent Done: Yes  Time out: Immediately prior to procedure a time out was called to verify the correct patient, procedure, equipment, support staff and site/side marked as required  Indications: med administration and vascular access  Anesthesia: local infiltration  Local anesthetic: lidocaine 1% without epinephrine  Anesthetic Total (mL): 2  Preparation: skin prepped with ChloraPrep  Skin prep agent dried: skin prep agent completely dried prior to procedure  Sterile barriers: all five maximum sterile barriers used - cap, mask, sterile gown, sterile gloves, and large sterile sheet  Hand hygiene: hand hygiene performed prior to central venous catheter insertion  Location details: right basilic  Catheter type: double lumen  Catheter size: 5 Fr  Catheter Length: 40cm    Ultrasound guidance: yes  Vessel Caliber: medium and patent, compressibility normal  Vascular Doppler: not done  Needle advanced into vessel with real time Ultrasound guidance.  Guidewire confirmed in vessel.  Image recorded and saved.  Sterile sheath used.  no esophageal manometryNumber of attempts: 1  Post-procedure: blood return through all ports, chlorhexidine patch and sterile dressing applied  Specimens: No  Implants: No  Assessment: placement verified by x-ray  Complications: none          Annabelle Varela  6/9/2018    "

## 2018-06-09 NOTE — SUBJECTIVE & OBJECTIVE
Interval History:   No acute events overnight.  Afebrile.  No leukocytosis.  Pain improved.  Per Podiatry note, plan wound vac Monday or Tuesday.  New cultures obtained yesterday - pending.  Remains on IV cefazolin.     Review of Systems   Constitutional: Negative for chills, diaphoresis and fever.   Respiratory: Negative for cough and shortness of breath.    Cardiovascular: Positive for leg swelling. Negative for chest pain.   Gastrointestinal: Negative for abdominal pain, diarrhea, nausea and vomiting.   Genitourinary: Negative for difficulty urinating, dysuria, frequency and hematuria.   Musculoskeletal: Positive for arthralgias (right foot) and joint swelling. Negative for back pain.   Skin: Positive for color change (redness RLE) and wound. Negative for rash.   Neurological: Negative for weakness and headaches.   Hematological: Negative for adenopathy.   Psychiatric/Behavioral: Negative for confusion. The patient is not nervous/anxious.    All other systems reviewed and are negative.    Objective:     Vital Signs (Most Recent):  Temp: 97.4 °F (36.3 °C) (06/09/18 1125)  Pulse: 66 (06/09/18 1125)  Resp: 18 (06/09/18 1125)  BP: 129/68 (06/09/18 1125)  SpO2: (!) 92 % (06/09/18 1125) Vital Signs (24h Range):  Temp:  [97.4 °F (36.3 °C)-99.3 °F (37.4 °C)] 97.4 °F (36.3 °C)  Pulse:  [57-69] 66  Resp:  [16-18] 18  SpO2:  [92 %-96 %] 92 %  BP: (106-130)/(50-71) 129/68     Weight: 122.5 kg (270 lb)  Body mass index is 35.62 kg/m².    Estimated Creatinine Clearance: 128.6 mL/min (based on SCr of 0.9 mg/dL).    Physical Exam   Constitutional: He is oriented to person, place, and time. He appears well-developed and well-nourished. No distress.   HENT:   Head: Normocephalic and atraumatic.   Eyes: Conjunctivae are normal. No scleral icterus.   Cardiovascular: Normal rate, regular rhythm and intact distal pulses.    No murmur heard.  Pulmonary/Chest: Effort normal. No respiratory distress. He has no wheezes.   Abdominal:  Soft. He exhibits no distension. There is no tenderness. There is no guarding.   Musculoskeletal: He exhibits edema and tenderness (right foot).   Neurological: He is alert and oriented to person, place, and time.   Skin: Skin is warm and dry. He is not diaphoretic. There is erythema.   Right foot wrapped - just changed by podiatry and not examined. See Podiatry Photos.  Cellulitis to ankle appears improved.  Less tenderness.     Psychiatric: He has a normal mood and affect. His behavior is normal. Thought content normal.       Significant Labs:   Blood Culture:   Recent Labs  Lab 06/01/18  1445 06/02/18  2111 06/02/18  2115 06/06/18  0950   LABBLOO No growth after 5 days. No growth after 5 days. No growth after 5 days. No Growth to date  No Growth to date  No Growth to date  No Growth to date     CBC:   Recent Labs  Lab 06/08/18  0521 06/09/18  0413   WBC 10.37 9.05   HGB 11.3* 11.2*   HCT 34.1* 33.6*    284     CMP:   Recent Labs  Lab 06/08/18  0521 06/09/18  0412   * 133*   K 4.7 4.2   CL 99 99   CO2 25 26   * 186*   BUN 11 10   CREATININE 0.9 0.9   CALCIUM 8.3* 8.3*   PROT 6.4 6.2   ALBUMIN 2.0* 1.9*   BILITOT 0.3 0.3   ALKPHOS 103 94   AST 33 29   ALT 27 21   ANIONGAP 7* 8   EGFRNONAA >60.0 >60.0     Wound Culture:   Recent Labs  Lab 06/02/18  1218 06/02/18  1226 06/08/18  1732   LABAERO STREPTOCOCCUS AGALACTIAE (GROUP B)ManyBeta-hemolytic streptococci are routinely susceptible to penicillins,cephalosporins and carbapenems.Susceptibility testing not routinely performed STREPTOCOCCUS AGALACTIAE (GROUP B)ManyBeta-hemolytic streptococci are routinely susceptible to penicillins,cephalosporins and carbapenems.Susceptibility testing not routinely performed Insufficient incubation, culture in progress       Significant Imaging: I have reviewed all pertinent imaging results/findings within the past 24 hours.

## 2018-06-09 NOTE — PROGRESS NOTES
[facility]  Podiatry  Progress Note    Patient Name: Billy Sheffield Miguel  MRN: [id number]  Admission Date: 6/1/2018  Hospital Length of Stay: 8 days  Attending Physician: Josiah Sawyer, *  Primary Care Provider: BRAD Baker MD   Interval Hx: S/p  right foot I&D DOS:6/2/18, 6/5/18 and 6/7/18 . Reports pain improved well controlled with pain meds. Removed large piece of glass from wound however  xrays reviewed noting residual foreign body in foot.     Scheduled Meds:   atorvastatin  80 mg Oral Daily    carvedilol  12.5 mg Oral BID    ceFAZolin (ANCEF) IVPB  2 g Intravenous Q8H    enoxaparin  40 mg Subcutaneous Daily    insulin aspart U-100  20 Units Subcutaneous TIDWM    insulin detemir U-100  55 Units Subcutaneous QHS    Lactobacillus rhamnosus GG  1 capsule Oral Daily    lisinopril  40 mg Oral Daily    meperidine  12.5 mg Intravenous Once    psyllium husk (aspartame)  1 packet Oral Daily    senna-docusate 8.6-50 mg  1 tablet Oral Daily    sodium chloride 0.9%  300 mL Intravenous Once     Continuous Infusions:  PRN Meds:acetaminophen, dextrose 50%, dextrose 50%, glucagon (human recombinant), glucose, glucose, insulin aspart U-100, ondansetron, oxyCODONE, sodium chloride 0.9%, sodium chloride 0.9%, sodium chloride 0.9%    Review of patient's allergies indicates:   Allergen Reactions    Penicillins Anaphylaxis    Shellfish containing products      Other reaction(s): Unknown    Vancomycin Itching    Bactrim  [sulfamethoxazole-trimethoprim] Rash        Past Medical History:   Diagnosis Date    Allergy     CAD (coronary artery disease), native coronary artery 6/25/2013    Cancer     Diabetes mellitus     Diabetes mellitus, type 2     Disorder of kidney and ureter     Heart attack 04/2012    Hyperlipidemia     Hypertension     Muscular pain     post-op after colonoscopy     Past Surgical History:   Procedure Laterality Date    COLONOSCOPY N/A 2/17/2017    Procedure:  COLONOSCOPY;  Surgeon: Simon Gomez MD;  Location: [facility] ENDO (4TH FLR);  Service: Endoscopy;  Laterality: N/A;    CORONARY ANGIOPLASTY      INCISION AND DRAINAGE FOOT Right 6/5/2018    Procedure: INCISION AND DRAINAGE, FOOT;  Surgeon: Bridget Santana DPM;  Location: [facility] OR 1ST FLR;  Service: Podiatry;  Laterality: Right;  Request mini C arm in room. request wound vac with medium black sponge    INCISION AND DRAINAGE FOOT Right 6/7/2018    Procedure: INCISION AND DRAINAGE, FOOT;  Surgeon: Emelia Brock DPM;  Location: [facility] OR 2ND FLR;  Service: Podiatry;  Laterality: Right;    INCISION AND DRAINAGE OF ABSCESS Right 6/2/2018    Procedure: INCISION AND DRAINAGE, ABSCESS;  Surgeon: Bridget Santana DPM;  Location: [facility] OR 2ND FLR;  Service: General;  Laterality: Right;    REMOVAL OF FOREIGN BODY FROM FOOT Right 6/7/2018    Procedure: REMOVAL, FOREIGN BODY, FOOT;  Surgeon: Emelia Brock DPM;  Location: [facility] OR 2ND FLR;  Service: Podiatry;  Laterality: Right;    TONSILLECTOMY         Family History     Problem Relation (Age of Onset)    Heart disease Mother, Brother        Social History Main Topics    Smoking status: Never Smoker    Smokeless tobacco: Never Used    Alcohol use Yes      Comment: rare x1 beer-     Drug use: No    Sexual activity: Not Currently     Review of Systems   Constitutional: Positive for activity change.   Respiratory: Negative for shortness of breath.    Cardiovascular: Negative for chest pain and leg swelling.   Gastrointestinal: Negative for nausea and vomiting.   Genitourinary: Negative.    Musculoskeletal: Positive for arthralgias.   Skin: Positive for wound.   Neurological: Negative for weakness and numbness.   Psychiatric/Behavioral: Negative.      Objective:     Vital Signs (Most Recent):  Temp: 97.4 °F (36.3 °C) (06/09/18 1125)  Pulse: 66 (06/09/18 1125)  Resp: 18 (06/09/18 1125)  BP: 129/68 (06/09/18 1125)  SpO2: (!) 92 % (06/09/18 1125) Vital Signs (24h Range):  Temp:   [97.4 °F (36.3 °C)-99.3 °F (37.4 °C)] 97.4 °F (36.3 °C)  Pulse:  [57-69] 66  Resp:  [16-18] 18  SpO2:  [92 %-96 %] 92 %  BP: (106-130)/(50-71) 129/68     Weight: 122.5 kg (270 lb)  Body mass index is 35.62 kg/m².    Foot Exam    General  Orientation: alert and oriented to person, place, and time       Right Foot/Ankle     Inspection and Palpation  Tenderness: metatarsals   Swelling: metatarsals   Skin Exam: ulcer;     Neurovascular  Dorsalis pedis: 1+  Posterior tibial: 1+      Left Foot/Ankle      Inspection and Palpation  Tenderness: none   Swelling: none   Skin Exam: erythema;     Neurovascular  Dorsalis pedis: 1+  Posterior tibial: 1+          6/9/18    Wound 1:Plantar right foot   Measurement: 5cmx0.2cmx0.5cm  Base: fibrous   Periwound skin: macerated tissue -   Drainage: serous  Erythema: continued erythema noted to right foot.   Probe: negatetive probe to bone.     Left second toe with dusky appearance- improved.     Wound 1: Right dorsal foot.   Measurement:2 incisions one at base of 2nd-- 0.5cmx0.5cmx0.5cm and 3rd toe. One at plantar foot-[id number] , 2nd interdigital space 1.0cmx0.5cmx0.1cm.    Base: fibrous base   Periwound skin: erythema with maceration.   Drainage: serous, exam limited 2/2 to pain.   Erythema: moderate   Probe: none, no bone exposed    Right dorsal suture site with ischemic appearance.               Gentian violet applied to right plantar foot.                           Laboratory:  CBC:     Recent Labs  Lab 06/09/18  0413   WBC 9.05   RBC 3.86*   HGB 11.2*   HCT 33.6*      MCV 87   MCH 29.0   MCHC 33.3     CMP:     Recent Labs  Lab 06/09/18  0412   *   CALCIUM 8.3*   ALBUMIN 1.9*   PROT 6.2   *   K 4.2   CO2 26   CL 99   BUN 10   CREATININE 0.9   ALKPHOS 94   ALT 21   AST 29   BILITOT 0.3       Diagnostic Results:  Xray: There is DJD and spurs on the calcaneus.  There is a foreign body soft tissues below in between the 1st and 2nd metatarsals thought to be a  shard of glass.  There may be soft tissue swelling.  No fracture dislocation bone destruction seen.    MRI: No MR evidence of osteomyelitis.    Diffuse subcutaneous and myofascial forefoot edema with multiple plantar skin defects, overall, concerning for cellulitis/myositis.  Associated radiopaque foreign body likely reflecting glass is more conspicuous on comparison foot radiograph.    Nonspecific dermal fluid collection along the plantar aspect of the 1st and 2nd metatarsophalangeal joints, possibly a blister.  An infected fluid collection cannot be excluded.  Suggest correlation with direct visualization and clinical findings.    Clinical Findings:  S/p  right foot I&D. Surgical site stable with serous drainage. Pain upon palpation right foot.     Assessment/Plan:     * Diabetic ulcer of right midfoot    S/p right foot I&D, surgical site with serous drainage.   Left second toe with dusky appearance improved. Right dorsal surgical site with ischemic appearance to suture site.   S/p angio per interventional cardiology, patient with 3 vessel runoff. Appreciate assistance.   Repeat xray right foot ordered, reviewed noting small piece <1mm  residual foreign body right foot.   Plan to monitor closely with wound VAC application likely Monday or Tuesday   Aerobic, anaerobic culture obtained   Abx per ID    Abx per ID appreciate assistance.   Podiatry will follow      Weightbearing Status: Partial heel WB right   Offloading Device: DARCO shoe     Tonya Valle DPM PGY-3  Pager: [phone]              Foreign body in right foot    S/p right foot I&D x3. Large piece of glass removed from foot. Residual piece of glass noted negligible in size <1mm.         Right foot infection    Per above         Type 2 diabetes mellitus 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, with long-term current use of insulin    Per primary             Tonya Valle MD  Podiatry  [facility]  [place]

## 2018-06-09 NOTE — SUBJECTIVE & OBJECTIVE
Interval Hx: S/p  right foot I&D DOS:6/2/18, 6/5/18 and 6/7/18 . Reports pain improved well controlled with pain meds. Removed large piece of glass from wound however  xrays reviewed noting residual foreign body in foot.     Scheduled Meds:   atorvastatin  80 mg Oral Daily    carvedilol  12.5 mg Oral BID    ceFAZolin (ANCEF) IVPB  2 g Intravenous Q8H    enoxaparin  40 mg Subcutaneous Daily    insulin aspart U-100  20 Units Subcutaneous TIDWM    insulin detemir U-100  55 Units Subcutaneous QHS    Lactobacillus rhamnosus GG  1 capsule Oral Daily    lisinopril  40 mg Oral Daily    meperidine  12.5 mg Intravenous Once    psyllium husk (aspartame)  1 packet Oral Daily    senna-docusate 8.6-50 mg  1 tablet Oral Daily    sodium chloride 0.9%  300 mL Intravenous Once     Continuous Infusions:  PRN Meds:acetaminophen, dextrose 50%, dextrose 50%, glucagon (human recombinant), glucose, glucose, insulin aspart U-100, ondansetron, oxyCODONE, sodium chloride 0.9%, sodium chloride 0.9%, sodium chloride 0.9%    Review of patient's allergies indicates:   Allergen Reactions    Penicillins Anaphylaxis    Shellfish containing products      Other reaction(s): Unknown    Vancomycin Itching    Bactrim  [sulfamethoxazole-trimethoprim] Rash        Past Medical History:   Diagnosis Date    Allergy     CAD (coronary artery disease), native coronary artery 6/25/2013    Cancer     Diabetes mellitus     Diabetes mellitus, type 2     Disorder of kidney and ureter     Heart attack 04/2012    Hyperlipidemia     Hypertension     Muscular pain     post-op after colonoscopy     Past Surgical History:   Procedure Laterality Date    COLONOSCOPY N/A 2/17/2017    Procedure: COLONOSCOPY;  Surgeon: Simon Gomez MD;  Location: [address]);  Service: Endoscopy;  Laterality: N/A;    CORONARY ANGIOPLASTY      INCISION AND DRAINAGE FOOT Right 6/5/2018    Procedure: INCISION AND DRAINAGE, FOOT;  Surgeon: Bridget Santana,  DAE;  Location: [facility] OR 1ST FLR;  Service: Podiatry;  Laterality: Right;  Request mini C arm in room. request wound vac with medium black sponge    INCISION AND DRAINAGE FOOT Right 6/7/2018    Procedure: INCISION AND DRAINAGE, FOOT;  Surgeon: Emelia Brock DPM;  Location: [facility] OR 2ND FLR;  Service: Podiatry;  Laterality: Right;    INCISION AND DRAINAGE OF ABSCESS Right 6/2/2018    Procedure: INCISION AND DRAINAGE, ABSCESS;  Surgeon: Bridget Santana DPM;  Location: [facility] OR 2ND FLR;  Service: General;  Laterality: Right;    REMOVAL OF FOREIGN BODY FROM FOOT Right 6/7/2018    Procedure: REMOVAL, FOREIGN BODY, FOOT;  Surgeon: Emelia Brock DPM;  Location: [facility] OR 2ND FLR;  Service: Podiatry;  Laterality: Right;    TONSILLECTOMY         Family History     Problem Relation (Age of Onset)    Heart disease Mother, Brother        Social History Main Topics    Smoking status: Never Smoker    Smokeless tobacco: Never Used    Alcohol use Yes      Comment: rare x1 beer-     Drug use: No    Sexual activity: Not Currently     Review of Systems   Constitutional: Positive for activity change.   Respiratory: Negative for shortness of breath.    Cardiovascular: Negative for chest pain and leg swelling.   Gastrointestinal: Negative for nausea and vomiting.   Genitourinary: Negative.    Musculoskeletal: Positive for arthralgias.   Skin: Positive for wound.   Neurological: Negative for weakness and numbness.   Psychiatric/Behavioral: Negative.      Objective:     Vital Signs (Most Recent):  Temp: 97.4 °F (36.3 °C) (06/09/18 1125)  Pulse: 66 (06/09/18 1125)  Resp: 18 (06/09/18 1125)  BP: 129/68 (06/09/18 1125)  SpO2: (!) 92 % (06/09/18 1125) Vital Signs (24h Range):  Temp:  [97.4 °F (36.3 °C)-99.3 °F (37.4 °C)] 97.4 °F (36.3 °C)  Pulse:  [57-69] 66  Resp:  [16-18] 18  SpO2:  [92 %-96 %] 92 %  BP: (106-130)/(50-71) 129/68     Weight: 122.5 kg (270 lb)  Body mass index is 35.62 kg/m².    Foot Exam    General  Orientation: alert  and oriented to person, place, and time       Right Foot/Ankle     Inspection and Palpation  Tenderness: metatarsals   Swelling: metatarsals   Skin Exam: ulcer;     Neurovascular  Dorsalis pedis: 1+  Posterior tibial: 1+      Left Foot/Ankle      Inspection and Palpation  Tenderness: none   Swelling: none   Skin Exam: erythema;     Neurovascular  Dorsalis pedis: 1+  Posterior tibial: 1+          6/9/18    Wound 1:Plantar right foot   Measurement: 5cmx0.2cmx0.5cm  Base: fibrous   Periwound skin: macerated tissue -   Drainage: serous  Erythema: continued erythema noted to right foot.   Probe: negatetive probe to bone.     Left second toe with dusky appearance- improved.     Wound 1: Right dorsal foot.   Measurement:2 incisions one at base of 2nd-- 0.5cmx0.5cmx0.5cm and 3rd toe. One at plantar foot-[id number] , 2nd interdigital space 1.0cmx0.5cmx0.1cm.    Base: fibrous base   Periwound skin: erythema with maceration.   Drainage: serous, exam limited 2/2 to pain.   Erythema: moderate   Probe: none, no bone exposed    Right dorsal suture site with ischemic appearance.               Gentian violet applied to right plantar foot.                           Laboratory:  CBC:     Recent Labs  Lab 06/09/18  0413   WBC 9.05   RBC 3.86*   HGB 11.2*   HCT 33.6*      MCV 87   MCH 29.0   MCHC 33.3     CMP:     Recent Labs  Lab 06/09/18  0412   *   CALCIUM 8.3*   ALBUMIN 1.9*   PROT 6.2   *   K 4.2   CO2 26   CL 99   BUN 10   CREATININE 0.9   ALKPHOS 94   ALT 21   AST 29   BILITOT 0.3       Diagnostic Results:  Xray: There is DJD and spurs on the calcaneus.  There is a foreign body soft tissues below in between the 1st and 2nd metatarsals thought to be a shard of glass.  There may be soft tissue swelling.  No fracture dislocation bone destruction seen.    MRI: No MR evidence of osteomyelitis.    Diffuse subcutaneous and myofascial forefoot edema with multiple plantar skin defects, overall, concerning for  cellulitis/myositis.  Associated radiopaque foreign body likely reflecting glass is more conspicuous on comparison foot radiograph.    Nonspecific dermal fluid collection along the plantar aspect of the 1st and 2nd metatarsophalangeal joints, possibly a blister.  An infected fluid collection cannot be excluded.  Suggest correlation with direct visualization and clinical findings.    Clinical Findings:  S/p  right foot I&D. Surgical site stable with serous drainage. Pain upon palpation right foot.

## 2018-06-09 NOTE — PROGRESS NOTES
[facility]  Infectious Disease  Progress Note    Patient Name: Billy Sheffield Miguel  MRN: [id number]  Admission Date: 6/1/2018  Length of Stay: 8 days  Attending Physician: Josiah Swayer, *  Primary Care Provider: BRAD Baker MD    Isolation Status: No active isolations  Assessment/Plan:      Right foot infection       52-year-old male admitted with right foot infection after penetrating glass trauma that failed outpatient antibiotics alone. In the ED patient was noted to have a fever, WBC of 15K, , . MRI showed diffuse subcutaneous and myofascial forefoot edema, fluid collection with associated radiopaque foreign body. No osteomyelitis. He was taken to the OR 6/2. 6/5, and 6/7  for washout with podiatry. Copious amounts of purulent drainage noted with tracking to dorsal foot. Surgical cultures grew GBS. Foreign body was found at 1st interdigital space but unable to remove. Large piece of glass removed  6/7.Small piece of residual glass left in soft tissue. Purulence and liquid necrosis was encounterd in the OR. Unfortunately, no surgical cultures were obtained during surgery on 6/5 or 6/7.  New bedside cultures obtained yesterday and are pending.  Patient underwent angiogram by interventional cardiology on 6/7 given dusky appearance of 2nd toe - good blood flow.      Patient is currently on IV Cefazolin. Afebrile over the last 24 hours. No leukocytosis. Clinically stable/non septic appearing. Right second toe continues to appear dusky with ischemic changes. Podiatry monitoring closely.      Plan  - Continue Cefazolin 2 g IV q 8 hours. Will not broaden coverage at this time as he has been afebrile over the last 24 hours and is clinically stable.  - Monitor closely for any clinical decompensation. Concerned that infection will not clear without removal of retained glass.  - Given severity of infection and close involvement of bone, would plan to treat as presumed  osteomyelitis with 6 weeks of IV antibiotics  - If fevers recur, low threshold to broaden antibiotic coverage  - Recommend Allergy follow up as an outpatient for PCN skin testing   - ID will follow.     Data reviewed and plan discussed with ID staff          Thank you.   Please call for any questions or concerns.  ADALBERTO Finnegan, ANP-C  [phone]    Subjective:     Principal Problem:Diabetic ulcer of right midfoot    HPI: Mr. Rivera is a 52-year-old male admitted with right foot infection. Patient states that he stepped on a piece of glass at work that penetrated through his shoe on May 19.  At that time he removed the glass, used antiseptic and antibacterial on his foot and felt that things were ok. He began having issues on May 26, when he noted that a large blister developed on his plantar surface near the first digit. No fevers, chills or sweats at home. On the 29th he was seen by his PCP and started on Cipro and Clindamycin. Patient notes that the clinda and Cipro gave him diarrhea, severe nausea and vomiting. Unfortunately his symptoms did not improve on antibiotics alone prompting him to come to the ED.     In the ED patient was noted to have a WBC of 15K, , , procalc 0.29. MRI showed diffuse subcutaneous and myofascial forefoot edema, fluid collection along the plantar aspect of the 1st and 2nd metatarsophalangeal joints with associated radiopaque foreign body likely reflecting glass.  No osteomyelitis. He was started on Dapto, Flagyl and Aztreonam given documented PCN allergy. He was taken to the OR yesterday for washout with podiatry. Copious amounts of purulent drainage noted from ulceration with tracking noted to dorsal foot at 1st interdigital space. Surgical cultures are growing GBS. He had an isolated fever post op. WBC is trending down. He is seen at bedside and feels well today. Pain controlled. Patient denies current fevers, chills, sweats, dysuria, rash, shortness of breath, chest  pain, abdominal pain. Diarrhea and nausea resolved. Of note, discussed his PCN allergy and patient states that his reaction was as a child and he does not remember the episode. His mother told him he went into a coma after receiving IM PCN. He denies being told of any anaphylactic like reactions. He is unaware if he has ever been treated with a cephalosporin.    Interval History:   No acute events overnight.  Afebrile.  No leukocytosis.  Pain improved.  Per Podiatry note, plan wound vac Monday or Tuesday.  New cultures obtained yesterday - pending.  Remains on IV cefazolin.     Review of Systems   Constitutional: Negative for chills, diaphoresis and fever.   Respiratory: Negative for cough and shortness of breath.    Cardiovascular: Positive for leg swelling. Negative for chest pain.   Gastrointestinal: Negative for abdominal pain, diarrhea, nausea and vomiting.   Genitourinary: Negative for difficulty urinating, dysuria, frequency and hematuria.   Musculoskeletal: Positive for arthralgias (right foot) and joint swelling. Negative for back pain.   Skin: Positive for color change (redness RLE) and wound. Negative for rash.   Neurological: Negative for weakness and headaches.   Hematological: Negative for adenopathy.   Psychiatric/Behavioral: Negative for confusion. The patient is not nervous/anxious.    All other systems reviewed and are negative.    Objective:     Vital Signs (Most Recent):  Temp: 97.4 °F (36.3 °C) (06/09/18 1125)  Pulse: 66 (06/09/18 1125)  Resp: 18 (06/09/18 1125)  BP: 129/68 (06/09/18 1125)  SpO2: (!) 92 % (06/09/18 1125) Vital Signs (24h Range):  Temp:  [97.4 °F (36.3 °C)-99.3 °F (37.4 °C)] 97.4 °F (36.3 °C)  Pulse:  [57-69] 66  Resp:  [16-18] 18  SpO2:  [92 %-96 %] 92 %  BP: (106-130)/(50-71) 129/68     Weight: 122.5 kg (270 lb)  Body mass index is 35.62 kg/m².    Estimated Creatinine Clearance: 128.6 mL/min (based on SCr of 0.9 mg/dL).    Physical Exam   Constitutional: He is oriented to  person, place, and time. He appears well-developed and well-nourished. No distress.   HENT:   Head: Normocephalic and atraumatic.   Eyes: Conjunctivae are normal. No scleral icterus.   Cardiovascular: Normal rate, regular rhythm and intact distal pulses.    No murmur heard.  Pulmonary/Chest: Effort normal. No respiratory distress. He has no wheezes.   Abdominal: Soft. He exhibits no distension. There is no tenderness. There is no guarding.   Musculoskeletal: He exhibits edema and tenderness (right foot).   Neurological: He is alert and oriented to person, place, and time.   Skin: Skin is warm and dry. He is not diaphoretic. There is erythema.   Right foot wrapped - just changed by podiatry and not examined. See Podiatry Photos.  Cellulitis to ankle appears improved.  Less tenderness.     Psychiatric: He has a normal mood and affect. His behavior is normal. Thought content normal.       Significant Labs:   Blood Culture:   Recent Labs  Lab 06/01/18  1445 06/02/18  2111 06/02/18  2115 06/06/18  0950   LABBLOO No growth after 5 days. No growth after 5 days. No growth after 5 days. No Growth to date  No Growth to date  No Growth to date  No Growth to date     CBC:   Recent Labs  Lab 06/08/18  0521 06/09/18  0413   WBC 10.37 9.05   HGB 11.3* 11.2*   HCT 34.1* 33.6*    284     CMP:   Recent Labs  Lab 06/08/18  0521 06/09/18  0412   * 133*   K 4.7 4.2   CL 99 99   CO2 25 26   * 186*   BUN 11 10   CREATININE 0.9 0.9   CALCIUM 8.3* 8.3*   PROT 6.4 6.2   ALBUMIN 2.0* 1.9*   BILITOT 0.3 0.3   ALKPHOS 103 94   AST 33 29   ALT 27 21   ANIONGAP 7* 8   EGFRNONAA >60.0 >60.0     Wound Culture:   Recent Labs  Lab 06/02/18  1218 06/02/18  1226 06/08/18  1732   LABAERO STREPTOCOCCUS AGALACTIAE (GROUP B)ManyBeta-hemolytic streptococci are routinely susceptible to penicillins,cephalosporins and carbapenems.Susceptibility testing not routinely performed STREPTOCOCCUS AGALACTIAE (GROUP B)ManyBeta-hemolytic  streptococci are routinely susceptible to penicillins,cephalosporins and carbapenems.Susceptibility testing not routinely performed Insufficient incubation, culture in progress       Significant Imaging: I have reviewed all pertinent imaging results/findings within the past 24 hours.

## 2018-06-09 NOTE — ASSESSMENT & PLAN NOTE
S/p right foot I&D, surgical site with serous drainage.   Left second toe with dusky appearance improved. Right dorsal surgical site with ischemic appearance to suture site.   S/p angio per interventional cardiology, patient with 3 vessel runoff. Appreciate assistance.   Repeat xray right foot ordered, reviewed noting small piece <1mm  residual foreign body right foot.   Plan to monitor closely with wound VAC application likely Monday or Tuesday   Aerobic, anaerobic culture obtained   Abx per ID    Abx per ID appreciate assistance.   Podiatry will follow      Weightbearing Status: Partial heel WB right   Offloading Device: DARCO shoe     Tonya Valle DPM PGY-3  Pager: [phone]

## 2018-06-09 NOTE — CONSULTS
Double lumen PICC placed to (R) BASILIC vein.   40cm in length, 0cm exposed, and 33cm arm circumference.  Lot # [id number]

## 2018-06-09 NOTE — PROCEDURES
"Billy Rivera is a 53 y.o. male patient.    Temp: 98.8 °F (37.1 °C) (06/09/18 0307)  Pulse: 98 (06/09/18 0804)  Resp: 16 (06/09/18 0307)  BP: 117/63 (06/09/18 0307)  SpO2: 95 % (06/09/18 0307)  Weight: 122.5 kg (270 lb) (06/01/18 2349)  Height: 6' 1" (185.4 cm) (06/01/18 2349)    PICC  Date/Time: 6/9/2018 9:34 AM  Performed by: RENE VIVEROS  Consent Done: Yes  Time out: Immediately prior to procedure a time out was called to verify the correct patient, procedure, equipment, support staff and site/side marked as required  Indications: med administration and vascular access  Anesthesia: local infiltration  Local anesthetic: lidocaine 1% without epinephrine  Anesthetic Total (mL): 2  Preparation: skin prepped with ChloraPrep  Skin prep agent dried: skin prep agent completely dried prior to procedure  Sterile barriers: all five maximum sterile barriers used - cap, mask, sterile gown, sterile gloves, and large sterile sheet  Hand hygiene: hand hygiene performed prior to central venous catheter insertion  Location details: right basilic  Catheter type: double lumen  Catheter size: 5 Fr  Catheter Length: 33cm    Ultrasound guidance: yes  Vessel Caliber: medium and patent, compressibility normal  Vascular Doppler: not done  Needle advanced into vessel with real time Ultrasound guidance.  Guidewire confirmed in vessel.  Image recorded and saved.  Sterile sheath used.  no esophageal manometryNumber of attempts: 1  Post-procedure: blood return through all ports, chlorhexidine patch and sterile dressing applied  Specimens: No  Implants: No  Assessment: placement verified by x-ray  Complications: none          Annabelle Varela  6/9/2018    "

## 2018-06-09 NOTE — SUBJECTIVE & OBJECTIVE
Interval History:   NAEON. Denies fever, pain. Reports last BM was 6/5/18. Does not feel the need to have a BM, denies straining. Passing flatus. Eating 3 meals a day.    Review of Systems   Constitutional: Negative for chills, fever and unexpected weight change.   HENT: Negative for hearing loss.    Eyes: Negative for visual disturbance.   Respiratory: Negative for cough and shortness of breath.    Cardiovascular: Negative for chest pain, palpitations and leg swelling.   Gastrointestinal: Positive for constipation. Negative for abdominal distention, abdominal pain, blood in stool, diarrhea, nausea, rectal pain and vomiting.   Genitourinary: Negative for dysuria and hematuria.   Musculoskeletal: Negative for arthralgias.   Skin: Positive for color change, rash and wound.   Neurological: Negative for seizures, weakness and headaches.   Hematological: Negative for adenopathy. Does not bruise/bleed easily.     Objective:     Vital Signs (Most Recent):  Temp: 97.4 °F (36.3 °C) (06/09/18 1125)  Pulse: 66 (06/09/18 1125)  Resp: 18 (06/09/18 1125)  BP: 129/68 (06/09/18 1125)  SpO2: (!) 92 % (06/09/18 1125) Vital Signs (24h Range):  Temp:  [97.4 °F (36.3 °C)-99.3 °F (37.4 °C)] 97.4 °F (36.3 °C)  Pulse:  [57-69] 66  Resp:  [16-18] 18  SpO2:  [92 %-96 %] 92 %  BP: (106-130)/(50-71) 129/68     Weight: 122.5 kg (270 lb)  Body mass index is 35.62 kg/m².  No intake or output data in the 24 hours ending 06/09/18 1504     Physical Exam   Constitutional: He is oriented to person, place, and time. He appears well-developed and well-nourished. No distress.   HENT:   Head: Normocephalic and atraumatic.   Eyes: Conjunctivae, EOM and lids are normal.   Neck: No tracheal deviation present. No thyromegaly present.   Cardiovascular: Normal rate, regular rhythm, normal heart sounds and intact distal pulses.    No murmur heard.  Pulmonary/Chest: Effort normal and breath sounds normal. No respiratory distress. He has no wheezes. He has no  rales.   Abdominal: Soft. He exhibits no distension and no mass. There is no tenderness. No hernia.   Musculoskeletal: He exhibits tenderness. He exhibits no edema.   Right foot bandage, intact, clean, dry. Point tenderness on sole of foot at location of wound. R second toe with purple discoloration and decreased strength/ROM compared to other toes.   Neurological: He is alert and oriented to person, place, and time. No cranial nerve deficit. He exhibits normal muscle tone.   Skin: Skin is warm and dry. Rash noted. He is not diaphoretic. There is erythema.   RLE erythema up to mid shin, no change from marked border. Non tender, non edematous.   Psychiatric: He has a normal mood and affect.   Vitals reviewed.    Significant Labs: All pertinent labs within the past 24 hours have been reviewed.  CMP:   6/9/2018 04:12   Sodium 133 (L)   Potassium 4.2   Chloride 99   CO2 26   Anion Gap 8   BUN, Bld 10   Creatinine 0.9   eGFR if non African American >60.0   eGFR if African American >60.0   Glucose 186 (H)   Calcium 8.3 (L)   Phosphorus 4.3   Magnesium 1.5 (L)   Alkaline Phosphatase 94   Total Protein 6.2   Albumin 1.9 (L)   Total Bilirubin 0.3   AST 29   ALT 21     CBC:   6/9/2018 04:13   WBC 9.05   RBC 3.86 (L)   Hemoglobin 11.2 (L)   Hematocrit 33.6 (L)   MCV 87   MCH 29.0   MCHC 33.3   RDW 12.1   Platelets 284   MPV 9.5   Gran% 67.6   Gran # (ANC) 6.1   Immature Granulocytes 2.3 (H)   Immature Grans (Abs) 0.21 (H)   Lymph% 15.9 (L)   Lymph # 1.4   Mono% 11.3   Mono # 1.0   Eosinophil% 2.5   Eos # 0.2   Basophil% 0.4   Baso # 0.04   nRBC 0     DM panel:   6/8/2018 13:38 6/8/2018 18:00 6/8/2018 21:09 6/9/2018 01:53 6/9/2018 10:11 6/9/2018 12:23   POCT Glucose 114 (H) 175 (H) 176 (H) 202 (H) 141 (H) 205 (H)     Significant Imaging: I have reviewed and interpreted all pertinent imaging results/findings within the past 24 hours.   No new imaging.

## 2018-06-09 NOTE — ASSESSMENT & PLAN NOTE
Cellulitis  R foot wound with foreign object (glass)  - S/p OR debridement by podiatry on day 2. Aerobic wound culture on 6/2/18 grew strep agalactiae. Anaerobic culture pending. No signs of osteomyelitis. Blood culture NGTD. WBC persistently 13. Repeat XR foot showed residual piece of glass in wound. Developed recurrent fevers with temp up to 102 F. Blood cultures from 6/2/18 showed no growth. Multiple unsuccessful attempts to retrieve remaining piece of glass in foot in OR on day 5. Continued to have fever afterwards, consistent with lack of source control. Blood cultures on 6/6/18 negative. Repeat I/D on day 7 had successful removal of large piece of residual glass. However, one tiny residual piece remained, confirmed by XR post op. Fever and WBC resolved afterwards.  - Cellulitis of R leg improving.  - Continue cefazolin 2 g IV q8h for 6 week course, per ID recs, for osteomyelitis coverage, given proximity of foreign object to bone (started on day 3). S/p aztreonam, flagyl, daptomycin initially.  PICC line placed on 6/8/18 for prolonged abx course.  - Wound culture 6/7/18 pending.  - Continue oxycodone IR 5 mg prn for pain.

## 2018-06-09 NOTE — ASSESSMENT & PLAN NOTE
52-year-old male admitted with right foot infection after penetrating glass trauma that failed outpatient antibiotics alone. In the ED patient was noted to have a fever, WBC of 15K, , . MRI showed diffuse subcutaneous and myofascial forefoot edema, fluid collection with associated radiopaque foreign body. No osteomyelitis. He was taken to the OR 6/2. 6/5, and 6/7  for washout with podiatry. Copious amounts of purulent drainage noted with tracking to dorsal foot. Surgical cultures grew GBS. Foreign body was found at 1st interdigital space but unable to remove. Large piece of glass removed  6/7.Small piece of residual glass left in soft tissue. Purulence and liquid necrosis was encounterd in the OR. Unfortunately, no surgical cultures were obtained during surgery on 6/5 or 6/7.  New bedside cultures obtained yesterday and are pending.  Patient underwent angiogram by interventional cardiology on 6/7 given dusky appearance of 2nd toe - good blood flow.      Patient is currently on IV Cefazolin. Afebrile over the last 24 hours. No leukocytosis. Clinically stable/non septic appearing. Right second toe continues to appear dusky with ischemic changes. Podiatry monitoring closely.      Plan  - Continue Cefazolin 2 g IV q 8 hours. Will not broaden coverage at this time as he has been afebrile over the last 24 hours and is clinically stable.  - Monitor closely for any clinical decompensation. Concerned that infection will not clear without removal of retained glass.  - Given severity of infection and close involvement of bone, would plan to treat as presumed osteomyelitis with 6 weeks of IV antibiotics  - If fevers recur, low threshold to broaden antibiotic coverage  - Recommend Allergy follow up as an outpatient for PCN skin testing   - ID will follow.     Data reviewed and plan discussed with ID staff

## 2018-06-09 NOTE — CONSULTS
Double lumen PICC placed to (R) BASILICvein.  40cm in length, 0cm exposed, and 33cm arm circumference.  Lot # [id number]

## 2018-06-10 LAB
ALBUMIN SERPL BCP-MCNC: 2.2 G/DL
ALP SERPL-CCNC: 87 U/L
ALT SERPL W/O P-5'-P-CCNC: 20 U/L
ANION GAP SERPL CALC-SCNC: 8 MMOL/L
AST SERPL-CCNC: 23 U/L
BACTERIA SPEC AEROBE CULT: NORMAL
BASOPHILS # BLD AUTO: 0.05 K/UL
BASOPHILS NFR BLD: 0.5 %
BILIRUB SERPL-MCNC: 0.4 MG/DL
BUN SERPL-MCNC: 11 MG/DL
CALCIUM SERPL-MCNC: 8.9 MG/DL
CHLORIDE SERPL-SCNC: 100 MMOL/L
CO2 SERPL-SCNC: 27 MMOL/L
CREAT SERPL-MCNC: 0.9 MG/DL
DIFFERENTIAL METHOD: ABNORMAL
EOSINOPHIL # BLD AUTO: 0.3 K/UL
EOSINOPHIL NFR BLD: 2.8 %
ERYTHROCYTE [DISTWIDTH] IN BLOOD BY AUTOMATED COUNT: 11.9 %
EST. GFR  (AFRICAN AMERICAN): >60 ML/MIN/1.73 M^2
EST. GFR  (NON AFRICAN AMERICAN): >60 ML/MIN/1.73 M^2
GLUCOSE SERPL-MCNC: 226 MG/DL
HCT VFR BLD AUTO: 34.5 %
HGB BLD-MCNC: 11.3 G/DL
IMM GRANULOCYTES # BLD AUTO: 0.28 K/UL
IMM GRANULOCYTES NFR BLD AUTO: 2.9 %
LYMPHOCYTES # BLD AUTO: 1.3 K/UL
LYMPHOCYTES NFR BLD: 13.5 %
MAGNESIUM SERPL-MCNC: 1.8 MG/DL
MCH RBC QN AUTO: 28.8 PG
MCHC RBC AUTO-ENTMCNC: 32.8 G/DL
MCV RBC AUTO: 88 FL
MONOCYTES # BLD AUTO: 1.2 K/UL
MONOCYTES NFR BLD: 12.2 %
NEUTROPHILS # BLD AUTO: 6.5 K/UL
NEUTROPHILS NFR BLD: 68.1 %
NRBC BLD-RTO: 0 /100 WBC
PHOSPHATE SERPL-MCNC: 4.1 MG/DL
PLATELET # BLD AUTO: 322 K/UL
PMV BLD AUTO: 9.4 FL
POCT GLUCOSE: 179 MG/DL (ref 70–110)
POCT GLUCOSE: 182 MG/DL (ref 70–110)
POCT GLUCOSE: 200 MG/DL (ref 70–110)
POCT GLUCOSE: 200 MG/DL (ref 70–110)
POCT GLUCOSE: 207 MG/DL (ref 70–110)
POCT GLUCOSE: 289 MG/DL (ref 70–110)
POTASSIUM SERPL-SCNC: 4.4 MMOL/L
PROT SERPL-MCNC: 6.8 G/DL
RBC # BLD AUTO: 3.93 M/UL
SODIUM SERPL-SCNC: 135 MMOL/L
WBC # BLD AUTO: 9.53 K/UL

## 2018-06-10 PROCEDURE — 11000001 HC ACUTE MED/SURG PRIVATE ROOM

## 2018-06-10 PROCEDURE — 99233 SBSQ HOSP IP/OBS HIGH 50: CPT | Mod: ,,, | Performed by: NURSE PRACTITIONER

## 2018-06-10 PROCEDURE — 85025 COMPLETE CBC W/AUTO DIFF WBC: CPT

## 2018-06-10 PROCEDURE — 83735 ASSAY OF MAGNESIUM: CPT

## 2018-06-10 PROCEDURE — 63600175 PHARM REV CODE 636 W HCPCS: Performed by: PHYSICIAN ASSISTANT

## 2018-06-10 PROCEDURE — 25000003 PHARM REV CODE 250: Performed by: STUDENT IN AN ORGANIZED HEALTH CARE EDUCATION/TRAINING PROGRAM

## 2018-06-10 PROCEDURE — 80053 COMPREHEN METABOLIC PANEL: CPT

## 2018-06-10 PROCEDURE — 63600175 PHARM REV CODE 636 W HCPCS: Performed by: STUDENT IN AN ORGANIZED HEALTH CARE EDUCATION/TRAINING PROGRAM

## 2018-06-10 PROCEDURE — 99233 SBSQ HOSP IP/OBS HIGH 50: CPT | Mod: ,,, | Performed by: HOSPITALIST

## 2018-06-10 PROCEDURE — 84100 ASSAY OF PHOSPHORUS: CPT

## 2018-06-10 RX ORDER — AMOXICILLIN 250 MG
1 CAPSULE ORAL DAILY
COMMUNITY
Start: 2018-06-11 | End: 2018-06-15

## 2018-06-10 RX ORDER — INSULIN ASPART 100 [IU]/ML
20 INJECTION, SOLUTION INTRAVENOUS; SUBCUTANEOUS
Status: DISCONTINUED | OUTPATIENT
Start: 2018-06-11 | End: 2018-06-13

## 2018-06-10 RX ORDER — CARVEDILOL 12.5 MG/1
12.5 TABLET ORAL 2 TIMES DAILY
Qty: 60 TABLET | Refills: 11 | Status: SHIPPED | OUTPATIENT
Start: 2018-06-10 | End: 2018-06-15

## 2018-06-10 RX ORDER — INSULIN ASPART 100 [IU]/ML
23 INJECTION, SOLUTION INTRAVENOUS; SUBCUTANEOUS
Status: DISCONTINUED | OUTPATIENT
Start: 2018-06-10 | End: 2018-06-13

## 2018-06-10 RX ADMIN — INSULIN ASPART 1 UNITS: 100 INJECTION, SOLUTION INTRAVENOUS; SUBCUTANEOUS at 11:06

## 2018-06-10 RX ADMIN — ENOXAPARIN SODIUM 40 MG: 100 INJECTION SUBCUTANEOUS at 05:06

## 2018-06-10 RX ADMIN — LISINOPRIL 40 MG: 20 TABLET ORAL at 09:06

## 2018-06-10 RX ADMIN — INSULIN ASPART 20 UNITS: 100 INJECTION, SOLUTION INTRAVENOUS; SUBCUTANEOUS at 09:06

## 2018-06-10 RX ADMIN — ATORVASTATIN CALCIUM 80 MG: 20 TABLET, FILM COATED ORAL at 09:06

## 2018-06-10 RX ADMIN — INSULIN ASPART 23 UNITS: 100 INJECTION, SOLUTION INTRAVENOUS; SUBCUTANEOUS at 05:06

## 2018-06-10 RX ADMIN — CEFAZOLIN 2 G: 1 INJECTION, POWDER, FOR SOLUTION INTRAMUSCULAR; INTRAVENOUS at 02:06

## 2018-06-10 RX ADMIN — INSULIN ASPART 23 UNITS: 100 INJECTION, SOLUTION INTRAVENOUS; SUBCUTANEOUS at 12:06

## 2018-06-10 RX ADMIN — CEFAZOLIN 2 G: 1 INJECTION, POWDER, FOR SOLUTION INTRAMUSCULAR; INTRAVENOUS at 10:06

## 2018-06-10 RX ADMIN — Medication 1 CAPSULE: at 09:06

## 2018-06-10 RX ADMIN — CARVEDILOL 12.5 MG: 12.5 TABLET, FILM COATED ORAL at 09:06

## 2018-06-10 RX ADMIN — ACETAMINOPHEN 650 MG: 325 TABLET ORAL at 12:06

## 2018-06-10 RX ADMIN — CEFAZOLIN 2 G: 1 INJECTION, POWDER, FOR SOLUTION INTRAMUSCULAR; INTRAVENOUS at 05:06

## 2018-06-10 NOTE — SUBJECTIVE & OBJECTIVE
Interval History:  No acute events overnight. Afebrile, no leukocytosis. Pain controlled. No complaints.  Repeat wound cultures of 6/8 also showing GBS  For wound vac placement tomorrow.      Review of Systems   Constitutional: Negative for chills, diaphoresis and fever.   Respiratory: Negative for cough and shortness of breath.    Cardiovascular: Positive for leg swelling. Negative for chest pain.   Gastrointestinal: Negative for abdominal pain, diarrhea, nausea and vomiting.   Genitourinary: Negative for difficulty urinating, dysuria, frequency and hematuria.   Musculoskeletal: Positive for arthralgias (right foot) and joint swelling. Negative for back pain.   Skin: Positive for color change (redness RLE) and wound. Negative for rash.   Neurological: Negative for weakness and headaches.   Hematological: Negative for adenopathy.   Psychiatric/Behavioral: Negative for confusion. The patient is not nervous/anxious.    All other systems reviewed and are negative.    Objective:     Vital Signs (Most Recent):  Temp: 98.9 °F (37.2 °C) (06/10/18 1524)  Pulse: (!) 58 (06/10/18 1524)  Resp: 17 (06/10/18 1524)  BP: 110/67 (06/10/18 1524)  SpO2: 97 % (06/10/18 1524) Vital Signs (24h Range):  Temp:  [97.3 °F (36.3 °C)-99 °F (37.2 °C)] 98.9 °F (37.2 °C)  Pulse:  [53-66] 58  Resp:  [17-18] 17  SpO2:  [91 %-97 %] 97 %  BP: ()/(51-73) 110/67     Weight: 122.5 kg (270 lb)  Body mass index is 35.62 kg/m².    Estimated Creatinine Clearance: 128.6 mL/min (based on SCr of 0.9 mg/dL).    Physical Exam   Constitutional: He is oriented to person, place, and time. He appears well-developed and well-nourished. No distress.   HENT:   Head: Normocephalic and atraumatic.   Eyes: Conjunctivae are normal. No scleral icterus.   Cardiovascular: Normal rate, regular rhythm and intact distal pulses.    No murmur heard.  Pulmonary/Chest: Effort normal. No respiratory distress. He has no wheezes.   Abdominal: Soft. He exhibits no distension.  There is no tenderness. There is no guarding.   Musculoskeletal: He exhibits edema and tenderness (right foot).   Wound examined with Podiatry.    Right dorsal foot wound with sutures intact - open wound distal foot at base of toes. Does not probe to bone.   Serous drainage only, no gross purulence. Still with some surrounding erythema to dorsum of foot, no warmth, appears to be slowly improving. 2nd toe duskiness improved.     Right plantar foot wound - sutures intact proximally with open wound distally.  Serous drainage only. Does not probe to bone.    Neurological: He is alert and oriented to person, place, and time.   Skin: Skin is warm and dry. He is not diaphoretic. There is erythema.   Psychiatric: He has a normal mood and affect. His behavior is normal. Thought content normal.               Significant Labs:   Blood Culture:   Recent Labs  Lab 06/01/18  1445 06/02/18  2111 06/02/18  2115 06/06/18  0950   LABBLOO No growth after 5 days. No growth after 5 days. No growth after 5 days. No Growth to date  No Growth to date  No Growth to date  No Growth to date  No Growth to date     CBC:   Recent Labs  Lab 06/09/18  0413 06/10/18  0500   WBC 9.05 9.53   HGB 11.2* 11.3*   HCT 33.6* 34.5*    322     CMP:   Recent Labs  Lab 06/09/18  0412 06/10/18  0500   * 135*   K 4.2 4.4   CL 99 100   CO2 26 27   * 226*   BUN 10 11   CREATININE 0.9 0.9   CALCIUM 8.3* 8.9   PROT 6.2 6.8   ALBUMIN 1.9* 2.2*   BILITOT 0.3 0.4   ALKPHOS 94 87   AST 29 23   ALT 21 20   ANIONGAP 8 8   EGFRNONAA >60.0 >60.0     Wound Culture:   Recent Labs  Lab 06/02/18  1218 06/02/18  1226 06/08/18  1732   LABAERO STREPTOCOCCUS AGALACTIAE (GROUP B)ManyBeta-hemolytic streptococci are routinely susceptible to penicillins,cephalosporins and carbapenems.Susceptibility testing not routinely performed STREPTOCOCCUS AGALACTIAE (GROUP B)ManyBeta-hemolytic streptococci are routinely susceptible to penicillins,cephalosporins and  carbapenems.Susceptibility testing not routinely performed STREPTOCOCCUS AGALACTIAE (GROUP B)FewBeta-hemolytic streptococci are routinely susceptible to penicillins,cephalosporins and carbapenems.       Significant Imaging: I have reviewed all pertinent imaging results/findings within the past 24 hours.

## 2018-06-10 NOTE — PROGRESS NOTES
[facility] Hospital Medicine  Progress Note    Patient Name: Billy Sheffield Miguel  MRN: [id number]  Patient Class: IP- Inpatient   Admission Date: 6/1/2018  Length of Stay: 9 days  Attending Physician: Josiah Sawyer, *  Primary Care Provider: BRAD Baker MD    [facility] Medicine Team: [facility] 2 Millicent Torre MD    Subjective:     Principal Problem:Diabetic ulcer of right midfoot    HPI:  Patient is a 52-year-old male with HTN, MI 2013 s/p 1 stent, DMT2, colon cancer s/p resection 2017 who presents after failing outpatient treatment for a right ulcer.  Patient states that he stepped on a piece of glass at work where he is  on the 19th of May and it went through his shoe. At that time he removed the glass, used antiseptic and antibacterial on his foot and felt that things are going OK. He didn't have any issues until last Saturday The 26th, when he noted that a puffy cherry blister was occurring on his plantar surface near the first digit. On Tuesday, the 29th he presented to his primary care physician in [place] where he was started on clinda and cipro. Patient notes that the clinda and Cipro gave him diarrhea and vomiting. He stated that he had diarrhea three times a day with some form, mostly liquid, no blood and it has been the same since it started. His vomiting occurred five times yesterday, and was brownish with food. His last diarrhea episode was at 10:30 this morning, and vomiting at 4:30 in the afternoon. The patient presented to his outside PCP today, without relief of symptoms on antibiotics. Patient denies fever, chills, nausea, vomiting, diarrhea, dysuria, rash, shortness of breath, chest pain, abdominal pain.  Patient states that his diabetes started 15 to 20 years ago, and has been reasonably well controlled since then with no neuropathy, optic issues or kidney issues. Patient notes that he had a stent in 2013 for coronary artery disease, and colon cancer  that was resected in April 2017 without any issues on follow up.    Hospital Course:  6/1/2018- admission date. Empirically started on daptomycin due to PCN allergy, aztreonam, flagyl for anaerobe coverage. Diarrhea resolved on admission, thought to be side effect of outpatient abx rather than c diff.  6/2/2018- Received OR debridement by podiatry. Tolerated procedure well. Had fever of 101.9 overnight. WBC persistently ~13. VASILIY 1.14 in LLE. RLE VASILIY unable to be obtained due to wound/infection.  6/3/2018- Aerobic wound culture grew strep agalactiae. Per ID recs, abx switched to cefazolin with no adverse effects. Fever of 101.4. Repeat XR of foot showed residual piece of glass in foot.  06/04/2018: Had not had any BM since admission. Was started on bowel regimen. However, pt start having diarrhea again, 4 episodes during the day, before receiving any bowel regimen. Lactulose and miralax were not given. Pt received 1 dose of senna-docusate overnight. Had 1 more episode of diarrhea overnight. Persistent hyponatremia with Na+ in low 130s. Urine studies showed low-normal urine sodium and normal osmolality. Fever of 101 recurred for 8 hours, resolved with tylenol. Pt denied symptoms during febrile episode. WBC slightly improved, left shift persisted. Thought to be due to residual foreign object embedded in foot.  06/05/2018: Recurrence of diarrhea thought to be secondary to antibiotics, given similar presentation while pt was previously on PO abx prior to admission. Started on probiotics. Stool became formed later in the day. Went to OR again for removal of remaining piece of glass in foot. Found to have purulent drainage from wound, which was debrided. However, glass was found to be too deeply embedded in tissue and was unable to be retrieved despite multiple attempts. Post op XR of foot showed soft tissue swelling and gas. Immediately post op, pt's fever recurred with temp up to 102 for 8 hours overnight.  6/6/2018:  reports fever and fatigue. Denies chills, myalgia, cough, sore throat, dysuria, WBC stable at 13. Denies foot pain or bleeding. Hgb stable. Right second toe noted to be blue/pale on exam. Thought to be bruising from pressure applied on toe in OR during debridement of foot vs ischemia (given unknown RLE VASILIY). Monitored for swelling, per podiatry recs.  06/07/2018: had fever of 101.1 F overnight that resolved with 1 dose of PO tylenol. VS otherwise stable. Pain controlled with oxycodone IR 5 mg x 1. WBC improved to 10. No BM since I/D two days ago. Went to OR again for I/D in attempt to remove residual piece of glass in wound, which successfully removed large piece of glass. Wound cultures were sent. Unfortunately, there was one small piece (<1 mm in size) residual in wound, which was confirmed on XR post op. Underwent BLE arterial angiogram for ischemic evaluation which showed 3 vessel run off.  06/08/2018: remained afebrile. WBC remained normal. Per ID recs, continued on IV abx for 6 week duration for presumed osteomyelitis, given proximity of foreign object/extent of wound to bone. PICC line was placed, confirmed by CXR. No BM since 6/5/18. Started on metamucil.  06/09/2018: remained afebrile with normal WBC. Blood cultures from 6/2/18 and 6/6/18 remained negative. Pain controlled with prn opiates. Started on senna-doc.  06/10/2018: remained afebrile with Tmax 100 F. HR and BP at goal, WBC wnl. Hgb remained stable, no signs of bleeding post op. Pain controlled, independently ambulated from bed to bathroom on heel of R foot, able to bear some weight over site of wound. R foot bandage changed daily. R second toe bruising improving. R leg cellulitis resolving. Had 1 formed stool yesterday and 1 this morning. BG in 140s-220s. Insulin regimen was increased.    Interval History:   NAEON. Had 1 BM yesterday evening and 1 this morning. Denies diarrhea, constipation, blood in stool. Denies R foot pain or bleeding from  surgical site. Ambulating independently from bed to bathroom multiple times per day.    Review of Systems   Constitutional: Negative for chills, fever and unexpected weight change.   HENT: Negative for hearing loss.    Eyes: Negative for visual disturbance.   Respiratory: Negative for cough and shortness of breath.    Cardiovascular: Negative for chest pain, palpitations and leg swelling.   Gastrointestinal: Negative for abdominal distention, abdominal pain, blood in stool, constipation, diarrhea, nausea, rectal pain and vomiting.   Genitourinary: Negative for dysuria and hematuria.   Musculoskeletal: Negative for arthralgias.   Skin: Positive for color change, rash and wound.   Neurological: Negative for seizures, weakness and headaches.   Hematological: Negative for adenopathy. Does not bruise/bleed easily.     Objective:     Vital Signs (Most Recent):  Temp: 97.3 °F (36.3 °C) (06/10/18 1205)  Pulse: (!) 59 (06/10/18 1205)  Resp: 18 (06/10/18 1205)  BP: 117/63 (06/10/18 1205)  SpO2: 96 % (06/10/18 1205) Vital Signs (24h Range):  Temp:  [97.3 °F (36.3 °C)-100 °F (37.8 °C)] 97.3 °F (36.3 °C)  Pulse:  [53-66] 59  Resp:  [17-18] 18  SpO2:  [91 %-96 %] 96 %  BP: ()/(51-73) 117/63     Weight: 122.5 kg (270 lb)  Body mass index is 35.62 kg/m².  No intake or output data in the 24 hours ending 06/10/18 1503     Physical Exam   Constitutional: He is oriented to person, place, and time. He appears well-developed and well-nourished. No distress.   HENT:   Head: Normocephalic and atraumatic.   Eyes: Conjunctivae, EOM and lids are normal.   Neck: No tracheal deviation present. No thyromegaly present.   Cardiovascular: Normal rate, regular rhythm, normal heart sounds and intact distal pulses.    No murmur heard.  Pulmonary/Chest: Effort normal and breath sounds normal. No respiratory distress. He has no wheezes. He has no rales.   Abdominal: Soft. He exhibits no distension and no mass. There is no tenderness. No hernia.    Musculoskeletal: He exhibits tenderness. He exhibits no edema.   Right foot bandage, intact, clean, dry. R second toe discoloration improving.   Neurological: He is alert and oriented to person, place, and time. No cranial nerve deficit. He exhibits normal muscle tone.   Skin: Skin is warm and dry. Rash noted. He is not diaphoretic. There is erythema.   RLE erythema up to ankle, reduced in area from marked border on admission. Non tender, non edematous.   Psychiatric: He has a normal mood and affect.   Vitals reviewed.    Significant Labs: All pertinent labs within the past 24 hours have been reviewed.  Micro:  Aerobic culture [[id number]] Collected: 06/08/18 1732   Order Status: Completed Specimen: Wound from Foot, Right Updated: 06/10/18 0834    Aerobic Bacterial Culture --    STREPTOCOCCUS AGALACTIAE (GROUP B)   Few   Beta-hemolytic streptococci are routinely susceptible to   penicillins,cephalosporins and carbapenems.    Culture, Anaerobe [[id number]] Collected: 06/08/18 1732   Order Status: Sent Specimen: Wound from Foot, Right Updated: 06/08/18 1810   Blood culture [[id number]] Collected: 06/06/18 0950   Order Status: Completed Specimen: Blood Updated: 06/10/18 1412    Blood Culture, Routine No Growth to date    Blood Culture, Routine No Growth to date    Blood Culture, Routine No Growth to date    Blood Culture, Routine No Growth to date    Blood Culture, Routine No Growth to date     CBC:   6/10/2018 05:00   WBC 9.53   RBC 3.93 (L)   Hemoglobin 11.3 (L)   Hematocrit 34.5 (L)   MCV 88   MCH 28.8   MCHC 32.8   RDW 11.9   Platelets 322   MPV 9.4   Gran% 68.1   Gran # (ANC) 6.5   Immature Granulocytes 2.9 (H)   Immature Grans (Abs) 0.28 (H)   Lymph% 13.5 (L)   Lymph # 1.3   Mono% 12.2   Mono # 1.2 (H)   Eosinophil% 2.8   Eos # 0.3   Basophil% 0.5   Baso # 0.05   nRBC 0     CMP:   6/10/2018 05:00   Sodium 135 (L)   Potassium 4.4   Chloride 100   CO2 27   Anion Gap 8   BUN, Bld 11   Creatinine 0.9   eGFR if non   >60.0   eGFR if African American >60.0   Glucose 226 (H)   Calcium 8.9   Phosphorus 4.1   Magnesium 1.8   Alkaline Phosphatase 87   Total Protein 6.8   Albumin 2.2 (L)   Total Bilirubin 0.4   AST 23   ALT 20     DM panel:   6/9/2018 10:11 6/9/2018 12:23 6/9/2018 17:14 6/9/2018 22:07 6/10/2018 04:52   POCT Glucose 141 (H) 205 (H) 169 (H) 289 (H) 207 (H)     Significant Imaging: I have reviewed and interpreted all pertinent imaging results/findings within the past 24 hours.   No new imaging.    Assessment/Plan:      * Diabetic ulcer of right midfoot    Cellulitis  R foot wound with foreign object (glass)  - S/p OR debridement by podiatry on day 2. Aerobic wound culture on 6/2/18 grew strep agalactiae. Anaerobic culture pending. No signs of osteomyelitis. Blood culture NGTD. WBC persistently 13. Repeat XR foot showed residual piece of glass in wound. Developed recurrent fevers with temp up to 102 F. Blood cultures from 6/2/18 showed no growth. Multiple unsuccessful attempts to retrieve remaining piece of glass in foot in OR on day 5. Continued to have fever afterwards, consistent with lack of source control. Blood cultures on 6/6/18 negative. Repeat I/D on day 7 had successful removal of large piece of residual glass. However, one tiny residual piece remained, confirmed by XR post op. Wound cultures from OR again grew group B strep. Fever and WBC resolved afterwards.  - Cellulitis of R leg improving.  - Continue cefazolin 2 g IV q8h for 6 week course, per ID recs, for osteomyelitis coverage, given proximity of foreign object to bone (started on day 3). S/p aztreonam, flagyl, daptomycin initially.  PICC line placed on 6/8/18 for prolonged abx course.  - Continue oxycodone IR 5 mg prn for pain.        Type 2 diabetes mellitus 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, with long-term current use of insulin    - Uncontrolled outpatient with hgb A1C of 12, BG  300's, and anion gap 14 on admission (previously 9.7 in 2/2018). On insulin glargine 55 units, aspart 20 units TIDWM, metformin, trulicity at home.  - BG currently 140s-220s on home insulin regimen with high BG towards afternoon/evening/early morning.  - Increase insulin detemir to 58 units daily.  - Continue insulin aspart 20 units with breakfast.  - Increase insulin aspart to 23 units with lunch and dinner.  - Continue SSI.  - POCT glucose AC & HS.  - Holding home metformin and trulicity while in house.        HTN (hypertension)    - On lisinopril 40 mg daily and carvedilol 12.5 mg BID at home.  - BP currently low 100s-130s/50s-70s, at goal.  - Continue home ACE-I.  - Continue home BB.  - Monitor.        Diarrhea    Constipation  - Initially presented with diarrhea, likely side effect of PO abx outpatient. No BM from day of admission to day 4, likely opiate-induced ileus. Recurrence of diarrhea on day 5, again likely side effect of abx use inpatient. Low suspicion for c diff, given mild leukocytosis, spontaneous resolution/improvement, and lack of abdominal pain. Post op course complicated by constipation since 6/5/18. Suspect ileus in the setting of opiate use. Constipation resolved on 6/9/18 after initiation of senna doc.  - Continue probiotics.  - Continue metamucil.  - Continue senna-doc.        CAD s/p PCI of LAD (SHELBY) in May 2013    - On ASA 81 mg daily and atorvastatin 80 mg daily at home.  - Currently stable. Asymptomatic.  - Continue home ASA.  - Continue home statin.        Dyslipidemia    - On atorvastatin 80 mg daily at home.  - Continue home statin.          VTE Risk Mitigation         Ordered     enoxaparin injection 40 mg  Daily      06/05/18 1706     IP VTE HIGH RISK PATIENT  Once      06/02/18 050        Millicent Torre MD  Department of Hospital Medicine   [facility]

## 2018-06-10 NOTE — PROGRESS NOTES
[facility]  Infectious Disease  Progress Note    Patient Name: Billy Sheffield Miguel  MRN: [id number]  Admission Date: 6/1/2018  Length of Stay: 9 days  Attending Physician: Josiah Sawyer, *  Primary Care Provider: BRDA Baker MD    Isolation Status: No active isolations  Assessment/Plan:      Right foot infection       52-year-old male admitted with right foot infection after penetrating glass trauma that failed outpatient antibiotics alone. In the ED patient was noted to have a fever, WBC of 15K, , . MRI showed diffuse subcutaneous and myofascial forefoot edema, fluid collection with associated radiopaque foreign body. No osteomyelitis. He was taken to the OR 6/2. 6/5, and 6/7  for washout with podiatry. Copious amounts of purulent drainage noted with tracking to dorsal foot. Surgical cultures grew GBS. Foreign body was found at 1st interdigital space but unable to remove. Large piece of glass removed  6/7.  Small piece of residual glass left in soft tissue and per Podiatry, it is likely so small as to be irretrievable.  Purulence and liquid necrosis was encounterd in the OR.   No surgical cultures were obtained during surgery on 6/5 or 6/7, but new bedside cultures obtained 6/9 also showing GBS.    Patient underwent angiogram by interventional cardiology on 6/7 given dusky appearance of 2nd toe - good blood flow.      Patient remains IV Cefazolin. Afebrile over the last 72  hours. No leukocytosis. Clinically stable/non septic appearing. Still with erythema to dorsal foot, but it does appear to be resolving.  For wound vac tomorrow.     Plan  - Continue Cefazolin 2 g IV q 8 hours.    - Monitor closely for any clinical decompensation. Concerned that infection will not clear without removal of retained glass fragment.   - Given severity of infection and close involvement of bone, would plan to treat as presumed osteomyelitis with 6 weeks of IV antibiotics  - Recommend  Allergy follow up as an outpatient for PCN skin testing   - ID will follow up tomorrow with final recommendations.      Data reviewed and plan discussed with ID staff          Thank you.   Please call for any questions or concerns.  ADALBERTO Finnegan, ANP-C  [phone]    Subjective:     Principal Problem:Diabetic ulcer of right midfoot    HPI: Mr. Rivera is a 52-year-old male admitted with right foot infection. Patient states that he stepped on a piece of glass at work that penetrated through his shoe on May 19.  At that time he removed the glass, used antiseptic and antibacterial on his foot and felt that things were ok. He began having issues on May 26, when he noted that a large blister developed on his plantar surface near the first digit. No fevers, chills or sweats at home. On the 29th he was seen by his PCP and started on Cipro and Clindamycin. Patient notes that the clinda and Cipro gave him diarrhea, severe nausea and vomiting. Unfortunately his symptoms did not improve on antibiotics alone prompting him to come to the ED.     In the ED patient was noted to have a WBC of 15K, , , procalc 0.29. MRI showed diffuse subcutaneous and myofascial forefoot edema, fluid collection along the plantar aspect of the 1st and 2nd metatarsophalangeal joints with associated radiopaque foreign body likely reflecting glass.  No osteomyelitis. He was started on Dapto, Flagyl and Aztreonam given documented PCN allergy. He was taken to the OR yesterday for washout with podiatry. Copious amounts of purulent drainage noted from ulceration with tracking noted to dorsal foot at 1st interdigital space. Surgical cultures are growing GBS. He had an isolated fever post op. WBC is trending down. He is seen at bedside and feels well today. Pain controlled. Patient denies current fevers, chills, sweats, dysuria, rash, shortness of breath, chest pain, abdominal pain. Diarrhea and nausea resolved. Of note, discussed his PCN  allergy and patient states that his reaction was as a child and he does not remember the episode. His mother told him he went into a coma after receiving IM PCN. He denies being told of any anaphylactic like reactions. He is unaware if he has ever been treated with a cephalosporin.    Interval History:  No acute events overnight. Afebrile, no leukocytosis. Pain controlled. No complaints.  Repeat wound cultures of 6/8 also showing GBS  For wound vac placement tomorrow.      Review of Systems   Constitutional: Negative for chills, diaphoresis and fever.   Respiratory: Negative for cough and shortness of breath.    Cardiovascular: Positive for leg swelling. Negative for chest pain.   Gastrointestinal: Negative for abdominal pain, diarrhea, nausea and vomiting.   Genitourinary: Negative for difficulty urinating, dysuria, frequency and hematuria.   Musculoskeletal: Positive for arthralgias (right foot) and joint swelling. Negative for back pain.   Skin: Positive for color change (redness RLE) and wound. Negative for rash.   Neurological: Negative for weakness and headaches.   Hematological: Negative for adenopathy.   Psychiatric/Behavioral: Negative for confusion. The patient is not nervous/anxious.    All other systems reviewed and are negative.    Objective:     Vital Signs (Most Recent):  Temp: 98.9 °F (37.2 °C) (06/10/18 1524)  Pulse: (!) 58 (06/10/18 1524)  Resp: 17 (06/10/18 1524)  BP: 110/67 (06/10/18 1524)  SpO2: 97 % (06/10/18 1524) Vital Signs (24h Range):  Temp:  [97.3 °F (36.3 °C)-99 °F (37.2 °C)] 98.9 °F (37.2 °C)  Pulse:  [53-66] 58  Resp:  [17-18] 17  SpO2:  [91 %-97 %] 97 %  BP: ()/(51-73) 110/67     Weight: 122.5 kg (270 lb)  Body mass index is 35.62 kg/m².    Estimated Creatinine Clearance: 128.6 mL/min (based on SCr of 0.9 mg/dL).    Physical Exam   Constitutional: He is oriented to person, place, and time. He appears well-developed and well-nourished. No distress.   HENT:   Head: Normocephalic  and atraumatic.   Eyes: Conjunctivae are normal. No scleral icterus.   Cardiovascular: Normal rate, regular rhythm and intact distal pulses.    No murmur heard.  Pulmonary/Chest: Effort normal. No respiratory distress. He has no wheezes.   Abdominal: Soft. He exhibits no distension. There is no tenderness. There is no guarding.   Musculoskeletal: He exhibits edema and tenderness (right foot).   Wound examined with Podiatry.    Right dorsal foot wound with sutures intact - open wound distal foot at base of toes. Does not probe to bone.   Serous drainage only, no gross purulence. Still with some surrounding erythema to dorsum of foot, no warmth, appears to be slowly improving. 2nd toe duskiness improved.     Right plantar foot wound - sutures intact proximally with open wound distally.  Serous drainage only. Does not probe to bone.    Neurological: He is alert and oriented to person, place, and time.   Skin: Skin is warm and dry. He is not diaphoretic. There is erythema.   Psychiatric: He has a normal mood and affect. His behavior is normal. Thought content normal.               Significant Labs:   Blood Culture:   Recent Labs  Lab 06/01/18  1445 06/02/18  2111 06/02/18  2115 06/06/18  0950   LABBLOO No growth after 5 days. No growth after 5 days. No growth after 5 days. No Growth to date  No Growth to date  No Growth to date  No Growth to date  No Growth to date     CBC:   Recent Labs  Lab 06/09/18  0413 06/10/18  0500   WBC 9.05 9.53   HGB 11.2* 11.3*   HCT 33.6* 34.5*    322     CMP:   Recent Labs  Lab 06/09/18  0412 06/10/18  0500   * 135*   K 4.2 4.4   CL 99 100   CO2 26 27   * 226*   BUN 10 11   CREATININE 0.9 0.9   CALCIUM 8.3* 8.9   PROT 6.2 6.8   ALBUMIN 1.9* 2.2*   BILITOT 0.3 0.4   ALKPHOS 94 87   AST 29 23   ALT 21 20   ANIONGAP 8 8   EGFRNONAA >60.0 >60.0     Wound Culture:   Recent Labs  Lab 06/02/18  1218 06/02/18  1226 06/08/18  1732   LABAERO STREPTOCOCCUS AGALACTIAE (GROUP  B)ManyBeta-hemolytic streptococci are routinely susceptible to penicillins,cephalosporins and carbapenems.Susceptibility testing not routinely performed STREPTOCOCCUS AGALACTIAE (GROUP B)ManyBeta-hemolytic streptococci are routinely susceptible to penicillins,cephalosporins and carbapenems.Susceptibility testing not routinely performed STREPTOCOCCUS AGALACTIAE (GROUP B)FewBeta-hemolytic streptococci are routinely susceptible to penicillins,cephalosporins and carbapenems.       Significant Imaging: I have reviewed all pertinent imaging results/findings within the past 24 hours.

## 2018-06-10 NOTE — ASSESSMENT & PLAN NOTE
Cellulitis  R foot wound with foreign object (glass)  - S/p OR debridement by podiatry on day 2. Aerobic wound culture on 6/2/18 grew strep agalactiae. Anaerobic culture pending. No signs of osteomyelitis. Blood culture NGTD. WBC persistently 13. Repeat XR foot showed residual piece of glass in wound. Developed recurrent fevers with temp up to 102 F. Blood cultures from 6/2/18 showed no growth. Multiple unsuccessful attempts to retrieve remaining piece of glass in foot in OR on day 5. Continued to have fever afterwards, consistent with lack of source control. Blood cultures on 6/6/18 negative. Repeat I/D on day 7 had successful removal of large piece of residual glass. However, one tiny residual piece remained, confirmed by XR post op. Wound cultures from OR again grew group B strep. Fever and WBC resolved afterwards.  - Cellulitis of R leg improving.  - Continue cefazolin 2 g IV q8h for 6 week course, per ID recs, for osteomyelitis coverage, given proximity of foreign object to bone (started on day 3). S/p aztreonam, flagyl, daptomycin initially.  PICC line placed on 6/8/18 for prolonged abx course.  - Continue oxycodone IR 5 mg prn for pain.

## 2018-06-10 NOTE — ASSESSMENT & PLAN NOTE
52-year-old male admitted with right foot infection after penetrating glass trauma that failed outpatient antibiotics alone. In the ED patient was noted to have a fever, WBC of 15K, , . MRI showed diffuse subcutaneous and myofascial forefoot edema, fluid collection with associated radiopaque foreign body. No osteomyelitis. He was taken to the OR 6/2. 6/5, and 6/7  for washout with podiatry. Copious amounts of purulent drainage noted with tracking to dorsal foot. Surgical cultures grew GBS. Foreign body was found at 1st interdigital space but unable to remove. Large piece of glass removed  6/7.  Small piece of residual glass left in soft tissue and per Podiatry, it is likely so small as to be irretrievable.  Purulence and liquid necrosis was encounterd in the OR.   No surgical cultures were obtained during surgery on 6/5 or 6/7, but new bedside cultures obtained 6/9 also showing GBS.    Patient underwent angiogram by interventional cardiology on 6/7 given dusky appearance of 2nd toe - good blood flow.      Patient remains IV Cefazolin. Afebrile over the last 72  hours. No leukocytosis. Clinically stable/non septic appearing. Still with erythema to dorsal foot, but it does appear to be resolving.  For wound vac tomorrow.     Plan  - Continue Cefazolin 2 g IV q 8 hours.    - Monitor closely for any clinical decompensation. Concerned that infection will not clear without removal of retained glass fragment.   - Given severity of infection and close involvement of bone, would plan to treat as presumed osteomyelitis with 6 weeks of IV antibiotics  - Recommend Allergy follow up as an outpatient for PCN skin testing   - ID will follow up tomorrow with final recommendations.      Data reviewed and plan discussed with ID staff

## 2018-06-10 NOTE — ASSESSMENT & PLAN NOTE
- Uncontrolled outpatient with hgb A1C of 12, 's, and anion gap 14 on admission (previously 9.7 in 2/2018). On insulin glargine 55 units, aspart 20 units TIDWM, metformin, trulicity at home.  - BG currently 140s-220s on home insulin regimen with high BG towards afternoon/evening/early morning.  - Increase insulin detemir to 58 units daily.  - Continue insulin aspart 20 units with breakfast.  - Increase insulin aspart to 23 units with lunch and dinner.  - Continue SSI.  - POCT glucose AC & HS.  - Holding home metformin and trulicity while in house.

## 2018-06-10 NOTE — ASSESSMENT & PLAN NOTE
Constipation  - Initially presented with diarrhea, likely side effect of PO abx outpatient. No BM from day of admission to day 4, likely opiate-induced ileus. Recurrence of diarrhea on day 5, again likely side effect of abx use inpatient. Low suspicion for c diff, given mild leukocytosis, spontaneous resolution/improvement, and lack of abdominal pain. Post op course complicated by constipation since 6/5/18. Suspect ileus in the setting of opiate use. Constipation resolved on 6/9/18 after initiation of senna doc.  - Continue probiotics.  - Continue metamucil.  - Continue senna-doc.

## 2018-06-10 NOTE — SUBJECTIVE & OBJECTIVE
Interval History:   TOMMY. Had 1 BM yesterday evening and 1 this morning. Denies diarrhea, constipation, blood in stool. Denies R foot pain or bleeding from surgical site. Ambulating independently from bed to bathroom multiple times per day.    Review of Systems   Constitutional: Negative for chills, fever and unexpected weight change.   HENT: Negative for hearing loss.    Eyes: Negative for visual disturbance.   Respiratory: Negative for cough and shortness of breath.    Cardiovascular: Negative for chest pain, palpitations and leg swelling.   Gastrointestinal: Negative for abdominal distention, abdominal pain, blood in stool, constipation, diarrhea, nausea, rectal pain and vomiting.   Genitourinary: Negative for dysuria and hematuria.   Musculoskeletal: Negative for arthralgias.   Skin: Positive for color change, rash and wound.   Neurological: Negative for seizures, weakness and headaches.   Hematological: Negative for adenopathy. Does not bruise/bleed easily.     Objective:     Vital Signs (Most Recent):  Temp: 97.3 °F (36.3 °C) (06/10/18 1205)  Pulse: (!) 59 (06/10/18 1205)  Resp: 18 (06/10/18 1205)  BP: 117/63 (06/10/18 1205)  SpO2: 96 % (06/10/18 1205) Vital Signs (24h Range):  Temp:  [97.3 °F (36.3 °C)-100 °F (37.8 °C)] 97.3 °F (36.3 °C)  Pulse:  [53-66] 59  Resp:  [17-18] 18  SpO2:  [91 %-96 %] 96 %  BP: ()/(51-73) 117/63     Weight: 122.5 kg (270 lb)  Body mass index is 35.62 kg/m².  No intake or output data in the 24 hours ending 06/10/18 1503     Physical Exam   Constitutional: He is oriented to person, place, and time. He appears well-developed and well-nourished. No distress.   HENT:   Head: Normocephalic and atraumatic.   Eyes: Conjunctivae, EOM and lids are normal.   Neck: No tracheal deviation present. No thyromegaly present.   Cardiovascular: Normal rate, regular rhythm, normal heart sounds and intact distal pulses.    No murmur heard.  Pulmonary/Chest: Effort normal and breath sounds normal.  No respiratory distress. He has no wheezes. He has no rales.   Abdominal: Soft. He exhibits no distension and no mass. There is no tenderness. No hernia.   Musculoskeletal: He exhibits tenderness. He exhibits no edema.   Right foot bandage, intact, clean, dry. R second toe discoloration improving.   Neurological: He is alert and oriented to person, place, and time. No cranial nerve deficit. He exhibits normal muscle tone.   Skin: Skin is warm and dry. Rash noted. He is not diaphoretic. There is erythema.   RLE erythema up to ankle, reduced in area from marked border on admission. Non tender, non edematous.   Psychiatric: He has a normal mood and affect.   Vitals reviewed.    Significant Labs: All pertinent labs within the past 24 hours have been reviewed.  Micro:  Aerobic culture [[id number]] Collected: 06/08/18 1732   Order Status: Completed Specimen: Wound from Foot, Right Updated: 06/10/18 0834    Aerobic Bacterial Culture --    STREPTOCOCCUS AGALACTIAE (GROUP B)   Few   Beta-hemolytic streptococci are routinely susceptible to   penicillins,cephalosporins and carbapenems.    Culture, Anaerobe [[id number]] Collected: 06/08/18 1732   Order Status: Sent Specimen: Wound from Foot, Right Updated: 06/08/18 1810   Blood culture [[id number]] Collected: 06/06/18 0950   Order Status: Completed Specimen: Blood Updated: 06/10/18 1412    Blood Culture, Routine No Growth to date    Blood Culture, Routine No Growth to date    Blood Culture, Routine No Growth to date    Blood Culture, Routine No Growth to date    Blood Culture, Routine No Growth to date     CBC:   6/10/2018 05:00   WBC 9.53   RBC 3.93 (L)   Hemoglobin 11.3 (L)   Hematocrit 34.5 (L)   MCV 88   MCH 28.8   MCHC 32.8   RDW 11.9   Platelets 322   MPV 9.4   Gran% 68.1   Gran # (ANC) 6.5   Immature Granulocytes 2.9 (H)   Immature Grans (Abs) 0.28 (H)   Lymph% 13.5 (L)   Lymph # 1.3   Mono% 12.2   Mono # 1.2 (H)   Eosinophil% 2.8   Eos # 0.3   Basophil% 0.5   Baso # 0.05    nRBC 0     CMP:   6/10/2018 05:00   Sodium 135 (L)   Potassium 4.4   Chloride 100   CO2 27   Anion Gap 8   BUN, Bld 11   Creatinine 0.9   eGFR if non African American >60.0   eGFR if African American >60.0   Glucose 226 (H)   Calcium 8.9   Phosphorus 4.1   Magnesium 1.8   Alkaline Phosphatase 87   Total Protein 6.8   Albumin 2.2 (L)   Total Bilirubin 0.4   AST 23   ALT 20     DM panel:   6/9/2018 10:11 6/9/2018 12:23 6/9/2018 17:14 6/9/2018 22:07 6/10/2018 04:52   POCT Glucose 141 (H) 205 (H) 169 (H) 289 (H) 207 (H)     Significant Imaging: I have reviewed and interpreted all pertinent imaging results/findings within the past 24 hours.   No new imaging.

## 2018-06-10 NOTE — SUBJECTIVE & OBJECTIVE
Interval Hx: S/p  right foot I&D DOS:6/2/18, 6/5/18 and 6/7/18 . Reports pain improved well controlled with pain meds. Removed large piece of glass from wound however  xrays reviewed noting residual foreign body in foot. Pt. Seen with ID.     Scheduled Meds:   atorvastatin  80 mg Oral Daily    carvedilol  12.5 mg Oral BID    ceFAZolin (ANCEF) IVPB  2 g Intravenous Q8H    enoxaparin  40 mg Subcutaneous Daily    [START ON 6/11/2018] insulin aspart U-100  20 Units Subcutaneous with breakfast    insulin aspart U-100  23 Units Subcutaneous with lunch    insulin aspart U-100  23 Units Subcutaneous with dinner    insulin detemir U-100  58 Units Subcutaneous QHS    Lactobacillus rhamnosus GG  1 capsule Oral Daily    lisinopril  40 mg Oral Daily    psyllium husk (aspartame)  1 packet Oral Daily    senna-docusate 8.6-50 mg  1 tablet Oral Daily     Continuous Infusions:  PRN Meds:acetaminophen, dextrose 50%, dextrose 50%, glucagon (human recombinant), glucose, glucose, insulin aspart U-100, ondansetron, oxyCODONE, sodium chloride 0.9%, sodium chloride 0.9%, sodium chloride 0.9%    Review of patient's allergies indicates:   Allergen Reactions    Penicillins Anaphylaxis    Shellfish containing products      Other reaction(s): Unknown    Vancomycin Itching    Bactrim  [sulfamethoxazole-trimethoprim] Rash        Past Medical History:   Diagnosis Date    Allergy     CAD (coronary artery disease), native coronary artery 6/25/2013    Cancer     Diabetes mellitus     Diabetes mellitus, type 2     Disorder of kidney and ureter     Heart attack 04/2012    Hyperlipidemia     Hypertension     Muscular pain     post-op after colonoscopy     Past Surgical History:   Procedure Laterality Date    COLONOSCOPY N/A 2/17/2017    Procedure: COLONOSCOPY;  Surgeon: Simon Gomez MD;  Location: [facility] ([address] [place] [address]);  Service: Endoscopy;  Laterality: N/A;    CORONARY ANGIOPLASTY      INCISION AND DRAINAGE FOOT Right  6/5/2018    Procedure: INCISION AND DRAINAGE, FOOT;  Surgeon: Bridget Santana DPM;  Location: [facility] OR 1ST FLR;  Service: Podiatry;  Laterality: Right;  Request mini C arm in room. request wound vac with medium black sponge    INCISION AND DRAINAGE FOOT Right 6/7/2018    Procedure: INCISION AND DRAINAGE, FOOT;  Surgeon: Emelia Brock DPM;  Location: [facility] OR 2ND FLR;  Service: Podiatry;  Laterality: Right;    INCISION AND DRAINAGE OF ABSCESS Right 6/2/2018    Procedure: INCISION AND DRAINAGE, ABSCESS;  Surgeon: Bridget Santana DPM;  Location: [facility] OR 2ND FLR;  Service: General;  Laterality: Right;    REMOVAL OF FOREIGN BODY FROM FOOT Right 6/7/2018    Procedure: REMOVAL, FOREIGN BODY, FOOT;  Surgeon: Emelia Brock DPM;  Location: [facility] OR 2ND FLR;  Service: Podiatry;  Laterality: Right;    TONSILLECTOMY         Family History     Problem Relation (Age of Onset)    Heart disease Mother, Brother        Social History Main Topics    Smoking status: Never Smoker    Smokeless tobacco: Never Used    Alcohol use Yes      Comment: rare x1 beer-     Drug use: No    Sexual activity: Not Currently     Review of Systems   Constitutional: Positive for activity change.   Respiratory: Negative for shortness of breath.    Cardiovascular: Negative for chest pain and leg swelling.   Gastrointestinal: Negative for nausea and vomiting.   Genitourinary: Negative.    Musculoskeletal: Positive for arthralgias.   Skin: Positive for wound.   Neurological: Negative for weakness and numbness.   Psychiatric/Behavioral: Negative.      Objective:     Vital Signs (Most Recent):  Temp: 97.3 °F (36.3 °C) (06/10/18 1205)  Pulse: (!) 59 (06/10/18 1205)  Resp: 18 (06/10/18 1205)  BP: 117/63 (06/10/18 1205)  SpO2: 96 % (06/10/18 1205) Vital Signs (24h Range):  Temp:  [97.3 °F (36.3 °C)-100 °F (37.8 °C)] 97.3 °F (36.3 °C)  Pulse:  [53-66] 59  Resp:  [17-18] 18  SpO2:  [91 %-96 %] 96 %  BP: ()/(51-73) 117/63     Weight: 122.5 kg (270  lb)  Body mass index is 35.62 kg/m².    Foot Exam    General  Orientation: alert and oriented to person, place, and time       Right Foot/Ankle     Inspection and Palpation  Tenderness: metatarsals   Swelling: metatarsals   Skin Exam: ulcer;     Neurovascular  Dorsalis pedis: 1+  Posterior tibial: 1+      Left Foot/Ankle      Inspection and Palpation  Tenderness: none   Swelling: none   Skin Exam: erythema;     Neurovascular  Dorsalis pedis: 1+  Posterior tibial: 1+          6/10/18    Wound 1:Plantar right foot   Measurement: 5cmx0.2cmx0.5cm  Base: fibrous   Periwound skin: macerated tissue -   Drainage: serous  Erythema: continued erythema noted to right foot.   Probe: negatetive probe to bone.     Left second toe with dusky appearance- improved.     Wound 1: Right dorsal foot.   Measurement:2 incisions one at base of 2nd-- 0.5cmx0.5cmx0.5cm and 3rd toe. One at plantar foot-[id number] , 2nd interdigital space 1.0cmx0.5cmx0.1cm.    Base: fibrous base   Periwound skin: erythema improved.   Drainage: serous,   Erythema: moderate - improved.   Probe: none, no bone exposed    Right dorsal suture site with ischemic appearance.                       Gentian violet applied to right plantar foot.                           Laboratory:  CBC:     Recent Labs  Lab 06/10/18  0500   WBC 9.53   RBC 3.93*   HGB 11.3*   HCT 34.5*      MCV 88   MCH 28.8   MCHC 32.8     CMP:     Recent Labs  Lab 06/10/18  0500   *   CALCIUM 8.9   ALBUMIN 2.2*   PROT 6.8   *   K 4.4   CO2 27      BUN 11   CREATININE 0.9   ALKPHOS 87   ALT 20   AST 23   BILITOT 0.4       Diagnostic Results:  Xray: There is DJD and spurs on the calcaneus.  There is a foreign body soft tissues below in between the 1st and 2nd metatarsals thought to be a shard of glass.  There may be soft tissue swelling.  No fracture dislocation bone destruction seen.    MRI: No MR evidence of osteomyelitis.    Diffuse subcutaneous and myofascial forefoot  edema with multiple plantar skin defects, overall, concerning for cellulitis/myositis.  Associated radiopaque foreign body likely reflecting glass is more conspicuous on comparison foot radiograph.    Nonspecific dermal fluid collection along the plantar aspect of the 1st and 2nd metatarsophalangeal joints, possibly a blister.  An infected fluid collection cannot be excluded.  Suggest correlation with direct visualization and clinical findings.    Clinical Findings:  S/p  right foot I&D. Surgical site stable with serous drainage. Pain upon palpation right foot.

## 2018-06-10 NOTE — PROGRESS NOTES
[facility]  Podiatry  Progress Note    Patient Name: Billy Sheffield Miguel  MRN: [id number]  Admission Date: 6/1/2018  Hospital Length of Stay: 9 days  Attending Physician: Josiah Sawyer, *  Primary Care Provider: BRAD Baker MD   Interval Hx: S/p  right foot I&D DOS:6/2/18, 6/5/18 and 6/7/18 . Reports pain improved well controlled with pain meds. Removed large piece of glass from wound however  xrays reviewed noting residual foreign body in foot. Pt. Seen with ID.     Scheduled Meds:   atorvastatin  80 mg Oral Daily    carvedilol  12.5 mg Oral BID    ceFAZolin (ANCEF) IVPB  2 g Intravenous Q8H    enoxaparin  40 mg Subcutaneous Daily    [START ON 6/11/2018] insulin aspart U-100  20 Units Subcutaneous with breakfast    insulin aspart U-100  23 Units Subcutaneous with lunch    insulin aspart U-100  23 Units Subcutaneous with dinner    insulin detemir U-100  58 Units Subcutaneous QHS    Lactobacillus rhamnosus GG  1 capsule Oral Daily    lisinopril  40 mg Oral Daily    psyllium husk (aspartame)  1 packet Oral Daily    senna-docusate 8.6-50 mg  1 tablet Oral Daily     Continuous Infusions:  PRN Meds:acetaminophen, dextrose 50%, dextrose 50%, glucagon (human recombinant), glucose, glucose, insulin aspart U-100, ondansetron, oxyCODONE, sodium chloride 0.9%, sodium chloride 0.9%, sodium chloride 0.9%    Review of patient's allergies indicates:   Allergen Reactions    Penicillins Anaphylaxis    Shellfish containing products      Other reaction(s): Unknown    Vancomycin Itching    Bactrim  [sulfamethoxazole-trimethoprim] Rash        Past Medical History:   Diagnosis Date    Allergy     CAD (coronary artery disease), native coronary artery 6/25/2013    Cancer     Diabetes mellitus     Diabetes mellitus, type 2     Disorder of kidney and ureter     Heart attack 04/2012    Hyperlipidemia     Hypertension     Muscular pain     post-op after colonoscopy     Past Surgical  History:   Procedure Laterality Date    COLONOSCOPY N/A 2/17/2017    Procedure: COLONOSCOPY;  Surgeon: Simon Gomez MD;  Location: [facility] ENDO (4TH FLR);  Service: Endoscopy;  Laterality: N/A;    CORONARY ANGIOPLASTY      INCISION AND DRAINAGE FOOT Right 6/5/2018    Procedure: INCISION AND DRAINAGE, FOOT;  Surgeon: Bridget Santana DPM;  Location: [facility] OR 1ST FLR;  Service: Podiatry;  Laterality: Right;  Request mini C arm in room. request wound vac with medium black sponge    INCISION AND DRAINAGE FOOT Right 6/7/2018    Procedure: INCISION AND DRAINAGE, FOOT;  Surgeon: Emelia Brock DPM;  Location: [facility] OR 2ND FLR;  Service: Podiatry;  Laterality: Right;    INCISION AND DRAINAGE OF ABSCESS Right 6/2/2018    Procedure: INCISION AND DRAINAGE, ABSCESS;  Surgeon: Bridget Santana DPM;  Location: [facility] OR 2ND FLR;  Service: General;  Laterality: Right;    REMOVAL OF FOREIGN BODY FROM FOOT Right 6/7/2018    Procedure: REMOVAL, FOREIGN BODY, FOOT;  Surgeon: Emelia Brock DPM;  Location: [facility] OR 2ND FLR;  Service: Podiatry;  Laterality: Right;    TONSILLECTOMY         Family History     Problem Relation (Age of Onset)    Heart disease Mother, Brother        Social History Main Topics    Smoking status: Never Smoker    Smokeless tobacco: Never Used    Alcohol use Yes      Comment: rare x1 beer-     Drug use: No    Sexual activity: Not Currently     Review of Systems   Constitutional: Positive for activity change.   Respiratory: Negative for shortness of breath.    Cardiovascular: Negative for chest pain and leg swelling.   Gastrointestinal: Negative for nausea and vomiting.   Genitourinary: Negative.    Musculoskeletal: Positive for arthralgias.   Skin: Positive for wound.   Neurological: Negative for weakness and numbness.   Psychiatric/Behavioral: Negative.      Objective:     Vital Signs (Most Recent):  Temp: 97.3 °F (36.3 °C) (06/10/18 1205)  Pulse: (!) 59 (06/10/18 1205)  Resp: 18 (06/10/18 1205)  BP:  117/63 (06/10/18 1205)  SpO2: 96 % (06/10/18 1205) Vital Signs (24h Range):  Temp:  [97.3 °F (36.3 °C)-100 °F (37.8 °C)] 97.3 °F (36.3 °C)  Pulse:  [53-66] 59  Resp:  [17-18] 18  SpO2:  [91 %-96 %] 96 %  BP: ()/(51-73) 117/63     Weight: 122.5 kg (270 lb)  Body mass index is 35.62 kg/m².    Foot Exam    General  Orientation: alert and oriented to person, place, and time       Right Foot/Ankle     Inspection and Palpation  Tenderness: metatarsals   Swelling: metatarsals   Skin Exam: ulcer;     Neurovascular  Dorsalis pedis: 1+  Posterior tibial: 1+      Left Foot/Ankle      Inspection and Palpation  Tenderness: none   Swelling: none   Skin Exam: erythema;     Neurovascular  Dorsalis pedis: 1+  Posterior tibial: 1+          6/10/18    Wound 1:Plantar right foot   Measurement: 5cmx0.2cmx0.5cm  Base: fibrous   Periwound skin: macerated tissue -   Drainage: serous  Erythema: continued erythema noted to right foot.   Probe: negatetive probe to bone.     Left second toe with dusky appearance- improved.     Wound 1: Right dorsal foot.   Measurement:2 incisions one at base of 2nd-- 0.5cmx0.5cmx0.5cm and 3rd toe. One at plantar foot-[id number] , 2nd interdigital space 1.0cmx0.5cmx0.1cm.    Base: fibrous base   Periwound skin: erythema improved.   Drainage: serous,   Erythema: moderate - improved.   Probe: none, no bone exposed    Right dorsal suture site with ischemic appearance.                       Gentian violet applied to right plantar foot.                           Laboratory:  CBC:     Recent Labs  Lab 06/10/18  0500   WBC 9.53   RBC 3.93*   HGB 11.3*   HCT 34.5*      MCV 88   MCH 28.8   MCHC 32.8     CMP:     Recent Labs  Lab 06/10/18  0500   *   CALCIUM 8.9   ALBUMIN 2.2*   PROT 6.8   *   K 4.4   CO2 27      BUN 11   CREATININE 0.9   ALKPHOS 87   ALT 20   AST 23   BILITOT 0.4       Diagnostic Results:  Xray: There is DJD and spurs on the calcaneus.  There is a foreign body soft  tissues below in between the 1st and 2nd metatarsals thought to be a shard of glass.  There may be soft tissue swelling.  No fracture dislocation bone destruction seen.    MRI: No MR evidence of osteomyelitis.    Diffuse subcutaneous and myofascial forefoot edema with multiple plantar skin defects, overall, concerning for cellulitis/myositis.  Associated radiopaque foreign body likely reflecting glass is more conspicuous on comparison foot radiograph.    Nonspecific dermal fluid collection along the plantar aspect of the 1st and 2nd metatarsophalangeal joints, possibly a blister.  An infected fluid collection cannot be excluded.  Suggest correlation with direct visualization and clinical findings.    Clinical Findings:  S/p  right foot I&D. Surgical site stable with serous drainage. Pain upon palpation right foot.     Assessment/Plan:     * Diabetic ulcer of right midfoot    S/p right foot I&D, surgical site with serous drainage.   Left second toe with dusky appearance improved. Right dorsal surgical site with ischemic appearance to suture site.   S/p angio per interventional cardiology, patient with 3 vessel runoff. Appreciate assistance.   Repeat xray right foot ordered, reviewed noting small piece <1mm  residual foreign body right foot.   Plan to monitor closely with wound VAC application likely Monday or Tuesday   Aerobic, anaerobic culture obtained   Abx per ID    Abx per ID appreciate assistance.   Podiatry will follow      Weightbearing Status: Partial heel WB right   Offloading Device: DARCO shoe     Tonya Valle DPM PGY-3  Pager: [phone]              Foreign body in right foot    S/p right foot I&D x3. Large piece of glass removed from foot. Residual piece of glass noted negligible in size <1mm.         Right foot infection    Per above         Type 2 diabetes mellitus 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, with long-term current use of insulin    Per  primary             Tonya Valle MD  Podiatry  [facility]

## 2018-06-10 NOTE — ASSESSMENT & PLAN NOTE
S/p right foot I&D, surgical site with serous drainage.   Left second toe with dusky appearance improved. Right dorsal surgical site with ischemic appearance to suture site.   S/p angio per interventional cardiology, patient with 3 vessel runoff. Appreciate assistance.   Repeat xray right foot ordered, reviewed noting small piece <1mm  residual foreign body right foot.   Plan to monitor closely with wound VAC application likely Monday or Tuesday   Aerobic, anaerobic culture obtained   Abx per ID    Abx per ID appreciate assistance.   Podiatry will follow      Weightbearing Status: Partial heel WB right   Offloading Device: DARCO shoe     Tonya Valle DPM PGY-3  Pager: [phone]

## 2018-06-11 PROBLEM — R19.7 DIARRHEA: Status: RESOLVED | Noted: 2018-06-04 | Resolved: 2018-06-11

## 2018-06-11 LAB
ALBUMIN SERPL BCP-MCNC: 2.3 G/DL
ALP SERPL-CCNC: 88 U/L
ALT SERPL W/O P-5'-P-CCNC: 21 U/L
ANION GAP SERPL CALC-SCNC: 9 MMOL/L
AST SERPL-CCNC: 26 U/L
BACTERIA BLD CULT: NORMAL
BASOPHILS # BLD AUTO: 0.06 K/UL
BASOPHILS NFR BLD: 0.6 %
BILIRUB SERPL-MCNC: 0.4 MG/DL
BUN SERPL-MCNC: 12 MG/DL
CALCIUM SERPL-MCNC: 8.8 MG/DL
CHLORIDE SERPL-SCNC: 101 MMOL/L
CO2 SERPL-SCNC: 26 MMOL/L
CREAT SERPL-MCNC: 0.9 MG/DL
DIFFERENTIAL METHOD: ABNORMAL
EOSINOPHIL # BLD AUTO: 0.3 K/UL
EOSINOPHIL NFR BLD: 2.6 %
ERYTHROCYTE [DISTWIDTH] IN BLOOD BY AUTOMATED COUNT: 12.1 %
EST. GFR  (AFRICAN AMERICAN): >60 ML/MIN/1.73 M^2
EST. GFR  (NON AFRICAN AMERICAN): >60 ML/MIN/1.73 M^2
GLUCOSE SERPL-MCNC: 189 MG/DL
HCT VFR BLD AUTO: 36.8 %
HGB BLD-MCNC: 12.5 G/DL
IMM GRANULOCYTES # BLD AUTO: 0.27 K/UL
IMM GRANULOCYTES NFR BLD AUTO: 2.6 %
LYMPHOCYTES # BLD AUTO: 1.8 K/UL
LYMPHOCYTES NFR BLD: 17.5 %
MAGNESIUM SERPL-MCNC: 1.3 MG/DL
MCH RBC QN AUTO: 29.7 PG
MCHC RBC AUTO-ENTMCNC: 34 G/DL
MCV RBC AUTO: 87 FL
MONOCYTES # BLD AUTO: 1 K/UL
MONOCYTES NFR BLD: 10.1 %
NEUTROPHILS # BLD AUTO: 6.8 K/UL
NEUTROPHILS NFR BLD: 66.6 %
NRBC BLD-RTO: 0 /100 WBC
PHOSPHATE SERPL-MCNC: 3.7 MG/DL
PLATELET # BLD AUTO: 367 K/UL
PMV BLD AUTO: 9.5 FL
POCT GLUCOSE: 123 MG/DL (ref 70–110)
POCT GLUCOSE: 137 MG/DL (ref 70–110)
POCT GLUCOSE: 171 MG/DL (ref 70–110)
POCT GLUCOSE: 186 MG/DL (ref 70–110)
POCT GLUCOSE: 218 MG/DL (ref 70–110)
POTASSIUM SERPL-SCNC: 4.4 MMOL/L
PROT SERPL-MCNC: 7 G/DL
RBC # BLD AUTO: 4.21 M/UL
SODIUM SERPL-SCNC: 136 MMOL/L
WBC # BLD AUTO: 10.2 K/UL

## 2018-06-11 PROCEDURE — 99233 SBSQ HOSP IP/OBS HIGH 50: CPT | Mod: ,,, | Performed by: PHYSICIAN ASSISTANT

## 2018-06-11 PROCEDURE — 25000003 PHARM REV CODE 250: Performed by: STUDENT IN AN ORGANIZED HEALTH CARE EDUCATION/TRAINING PROGRAM

## 2018-06-11 PROCEDURE — 63600175 PHARM REV CODE 636 W HCPCS: Performed by: PHYSICIAN ASSISTANT

## 2018-06-11 PROCEDURE — 63600175 PHARM REV CODE 636 W HCPCS: Performed by: STUDENT IN AN ORGANIZED HEALTH CARE EDUCATION/TRAINING PROGRAM

## 2018-06-11 PROCEDURE — 83735 ASSAY OF MAGNESIUM: CPT

## 2018-06-11 PROCEDURE — 99232 SBSQ HOSP IP/OBS MODERATE 35: CPT | Mod: ,,, | Performed by: HOSPITALIST

## 2018-06-11 PROCEDURE — 84100 ASSAY OF PHOSPHORUS: CPT

## 2018-06-11 PROCEDURE — 11000001 HC ACUTE MED/SURG PRIVATE ROOM

## 2018-06-11 PROCEDURE — 80053 COMPREHEN METABOLIC PANEL: CPT

## 2018-06-11 PROCEDURE — 99024 POSTOP FOLLOW-UP VISIT: CPT | Mod: ,,, | Performed by: PODIATRIST

## 2018-06-11 PROCEDURE — 85025 COMPLETE CBC W/AUTO DIFF WBC: CPT

## 2018-06-11 RX ORDER — CEFAZOLIN SODIUM 1 G/3ML
2 INJECTION, POWDER, FOR SOLUTION INTRAMUSCULAR; INTRAVENOUS EVERY 8 HOURS
Qty: 198000 MG | Refills: 0 | Status: ON HOLD | OUTPATIENT
Start: 2018-06-11 | End: 2018-07-19 | Stop reason: HOSPADM

## 2018-06-11 RX ORDER — LANOLIN ALCOHOL/MO/W.PET/CERES
400 CREAM (GRAM) TOPICAL 2 TIMES DAILY
Refills: 0 | COMMUNITY
Start: 2018-06-11 | End: 2018-06-15

## 2018-06-11 RX ORDER — INSULIN LISPRO 100 [IU]/ML
INJECTION, SOLUTION INTRAVENOUS; SUBCUTANEOUS
Qty: 18 ML | Refills: 3 | Status: SHIPPED | OUTPATIENT
Start: 2018-06-11 | End: 2018-06-15

## 2018-06-11 RX ORDER — INSULIN GLARGINE 100 [IU]/ML
INJECTION, SOLUTION SUBCUTANEOUS
Qty: 2 BOX | Refills: 3
Start: 2018-06-11 | End: 2018-06-15

## 2018-06-11 RX ORDER — LANOLIN ALCOHOL/MO/W.PET/CERES
400 CREAM (GRAM) TOPICAL 2 TIMES DAILY
Qty: 60 TABLET | Refills: 2 | Status: CANCELLED | OUTPATIENT
Start: 2018-06-11

## 2018-06-11 RX ORDER — LANOLIN ALCOHOL/MO/W.PET/CERES
400 CREAM (GRAM) TOPICAL 2 TIMES DAILY
Status: DISCONTINUED | OUTPATIENT
Start: 2018-06-11 | End: 2018-06-15 | Stop reason: HOSPADM

## 2018-06-11 RX ORDER — ASPIRIN 81 MG/1
81 TABLET ORAL DAILY
Refills: 0 | COMMUNITY
Start: 2018-06-11

## 2018-06-11 RX ORDER — ASPIRIN 81 MG/1
81 TABLET ORAL DAILY
Status: DISCONTINUED | OUTPATIENT
Start: 2018-06-11 | End: 2018-06-15 | Stop reason: HOSPADM

## 2018-06-11 RX ORDER — MAGNESIUM SULFATE HEPTAHYDRATE 40 MG/ML
2 INJECTION, SOLUTION INTRAVENOUS ONCE
Status: COMPLETED | OUTPATIENT
Start: 2018-06-11 | End: 2018-06-11

## 2018-06-11 RX ORDER — ASPIRIN 81 MG/1
81 TABLET ORAL DAILY
Qty: 90 TABLET | Refills: 3 | Status: CANCELLED | OUTPATIENT
Start: 2018-06-11 | End: 2019-06-11

## 2018-06-11 RX ADMIN — INSULIN ASPART 23 UNITS: 100 INJECTION, SOLUTION INTRAVENOUS; SUBCUTANEOUS at 06:06

## 2018-06-11 RX ADMIN — Medication 400 MG: at 05:06

## 2018-06-11 RX ADMIN — CARVEDILOL 12.5 MG: 12.5 TABLET, FILM COATED ORAL at 09:06

## 2018-06-11 RX ADMIN — INSULIN ASPART 20 UNITS: 100 INJECTION, SOLUTION INTRAVENOUS; SUBCUTANEOUS at 09:06

## 2018-06-11 RX ADMIN — LISINOPRIL 40 MG: 20 TABLET ORAL at 09:06

## 2018-06-11 RX ADMIN — Medication 1 CAPSULE: at 09:06

## 2018-06-11 RX ADMIN — INSULIN ASPART 23 UNITS: 100 INJECTION, SOLUTION INTRAVENOUS; SUBCUTANEOUS at 01:06

## 2018-06-11 RX ADMIN — ASPIRIN 81 MG: 81 TABLET, COATED ORAL at 10:06

## 2018-06-11 RX ADMIN — MAGNESIUM SULFATE IN WATER 2 G: 40 INJECTION, SOLUTION INTRAVENOUS at 10:06

## 2018-06-11 RX ADMIN — CEFAZOLIN 2 G: 1 INJECTION, POWDER, FOR SOLUTION INTRAMUSCULAR; INTRAVENOUS at 09:06

## 2018-06-11 RX ADMIN — ENOXAPARIN SODIUM 40 MG: 100 INJECTION SUBCUTANEOUS at 05:06

## 2018-06-11 RX ADMIN — INSULIN ASPART 1 UNITS: 100 INJECTION, SOLUTION INTRAVENOUS; SUBCUTANEOUS at 09:06

## 2018-06-11 RX ADMIN — ATORVASTATIN CALCIUM 80 MG: 20 TABLET, FILM COATED ORAL at 09:06

## 2018-06-11 RX ADMIN — CEFAZOLIN 2 G: 1 INJECTION, POWDER, FOR SOLUTION INTRAMUSCULAR; INTRAVENOUS at 05:06

## 2018-06-11 RX ADMIN — CEFAZOLIN 2 G: 1 INJECTION, POWDER, FOR SOLUTION INTRAMUSCULAR; INTRAVENOUS at 02:06

## 2018-06-11 NOTE — ASSESSMENT & PLAN NOTE
- On ASA 81 mg daily and atorvastatin 80 mg daily at home.  - Home ASA held on admission in the setting of pending surgery.  - Remained stable and asymptomatic throughout course.  - Resume home ASA.  - Continue home statin.

## 2018-06-11 NOTE — ASSESSMENT & PLAN NOTE
- Uncontrolled outpatient with hgb A1C of 12, 's, and anion gap 14 on admission (previously 9.7 in 2/2018). On insulin glargine 55 units, aspart 20 units TIDWM, metformin, trulicity at home.  - BG at lunch time, dinner time, bedtime, and overnight improving to 170-200 on higher dose insulin.  - Continue insulin detemir to 58 units daily.  - Continue insulin aspart 20 units with breakfast.  - Continue insulin aspart to 23 units with lunch and dinner.  - Continue SSI.  - POCT glucose AC & HS.  - Holding home metformin and trulicity while in house.

## 2018-06-11 NOTE — ASSESSMENT & PLAN NOTE
- On lisinopril 40 mg daily and carvedilol 12.5 mg BID at home.  - BP currently at goal.  - Continue home ACE-I.  - Continue home BB.  - Monitor.

## 2018-06-11 NOTE — PLAN OF CARE
[facility]    HOME HEALTH ORDERS  FACE TO FACE ENCOUNTER    Patient Name: Billy Yatesn  YOB: 1965    PCP: BRAD Baker MD   PCP Address: [address] [place] / [place]  PCP Phone Number: [phone]  PCP Fax: [phone]    Encounter Date: 06/11/2018    Admit to Home Health    Diagnoses:  Active Hospital Problems    Diagnosis  POA    *Diabetic ulcer of right midfoot [E11.621, L97.419]  Yes     Priority: 1 - High    Type 2 diabetes mellitus 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, with long-term current use of insulin [E11.3393, Z79.4]  Not Applicable     Priority: 3     HTN (hypertension) [I10]  Yes     Priority: 3     Diarrhea [R19.7]  Unknown     Priority: 4     Foreign body in right foot [S90.851A]  Yes    Right foot infection [L08.9]  Yes    CAD s/p PCI of LAD (SHELBY) in May 2013 [I25.10]  Yes    Dyslipidemia [E78.5]  Yes      Resolved Hospital Problems    Diagnosis Date Resolved POA    Fever [R50.9] 06/09/2018 Unknown     Priority: 2     Leukocytosis [D72.829] 06/09/2018 Yes     Priority: 2     Colon cancer [C18.9] 06/04/2018 Yes       No future appointments.    I have seen and examined this patient face to face today. My clinical findings that support the need for the home health skilled services and home bound status are the following:  Weakness/numbness causing balance and gait disturbance due to Infection and Surgery making it taxing to leave home.    Allergies:  Review of patient's allergies indicates:   Allergen Reactions    Penicillins Other (See Comments)     PCN allergy as a child - was told he went into a coma. Tolerates Cefazolin without adverse reactions    Shellfish containing products      Other reaction(s): Unknown    Vancomycin Itching    Bactrim  [sulfamethoxazole-trimethoprim] Rash       Diet: cardiac diet and diabetic diet: 2000 calorie    Activities: activity as  tolerated    Nursing:   SN to complete comprehensive assessment including routine vital signs. Instruct on disease process and s/s of complications to report to MD. Review/verify medication list sent home with the patient at time of discharge  and instruct patient/caregiver as needed. Frequency may be adjusted depending on start of care date.    Notify MD if SBP > 160 or < 90; DBP > 90 or < 50; HR > 120 or < 50; Temp > 101;    CONSULTS:    Physical Therapy to evaluate and treat. Evaluate for home safety and equipment needs; Establish/upgrade home exercise program. Perform / instruct on therapeutic exercises, gait training, transfer training, and Range of Motion.  Occupational Therapy to evaluate and treat. Evaluate home environment for safety and equipment needs. Perform/Instruct on transfers, ADL training, ROM, and therapeutic exercises.  Aide to provide assistance with personal care, ADLs, and vital signs.    MISCELLANEOUS CARE:  Home Infusion Therapy:   SN to perform Infusion Therapy/Central Line Care.  Review Central Line Care & Central Line Flush with patient.    Administer (drug and dose): cefazolin 2 g into vein every 8 hours.    Last dose given: 10 am on 6/11/18                      Home dose due: 8 pm on 6/11/18    Scrub the Hub: Prior to accessing the line, always perform a 30 second alcohol scrub  Each lumen of the central line is to be flushed at least daily with 10 mL Normal Saline and 3 mL Heparin flush (100 units/mL)  Skilled Nurse (SN) may draw blood from IV access  Blood Draw Procedure:   - Aspirate at least 5 mL of blood   - Discard   - Obtain specimen   - Change posiflow cap   - Flush with 20 mL Normal Saline followed by a                 3-5 mL Heparin flush (100 units/mL)  Central :   - Sterile dressing changes are done weekly and as needed.   - Use chlor-hexadine scrub to cleanse site, apply Biopatch to insertion site,       apply securement device dressing   - Posi-flow caps  are changed weekly and after EVERY lab draw.   - If sterile gauze is under dressing to control oozing,                 dressing change must be performed every 24 hours until gauze is not needed.    WOUND CARE ORDERS  yes:  Surgical Wound:  Location: right foot    Wound Vac:   Location: right foot           Dressing changes every Monday, Wednesday and Friday.        ET Consult      Medications: Review discharge medications with patient and family and provide education.      Current Discharge Medication List      START taking these medications    Details   aspirin (ECOTRIN) 81 MG EC tablet Take 1 tablet (81 mg total) by mouth once daily.  Refills: 0      ceFAZolin (ANCEF) 1 gram injection Inject 2,000 mg (2 g total) into the vein every 8 (eight) hours. End date 7/14/18  Qty: [id number] mg, Refills: 0      Lactobacillus rhamnosus GG (CULTURELLE) 10 billion cell capsule Take 1 capsule by mouth once daily.      magnesium oxide (MAG-OX) 400 mg tablet Take 1 tablet (400 mg total) by mouth 2 (two) times daily.  Refills: 0      psyllium husk, aspartame, (METAMUCIL) 3.4 gram PwPk packet Take 1 packet by mouth once daily.      senna-docusate 8.6-50 mg (PERICOLACE) 8.6-50 mg per tablet Take 1 tablet by mouth once daily.         CONTINUE these medications which have CHANGED    Details   carvedilol (COREG) 12.5 MG tablet Take 1 tablet (12.5 mg total) by mouth 2 (two) times daily.  Qty: 60 tablet, Refills: 11      insulin glargine (LANTUS SOLOSTAR U-100 INSULIN) 100 unit/mL (3 mL) InPn pen INJECT 58 UNITS SUBCUTANEOUSLY IN THE EVENING  Qty: 2 Box, Refills: 3    Associated Diagnoses: Type 2 diabetes mellitus with hyperglycemia, with long-term current use of insulin      insulin lispro (HUMALOG) 100 unit/mL injection Inject 20 units before breakfast, 23 units before lunch, and 23 units before dinner every day.  Qty: 18 mL, Refills: 3         CONTINUE these medications which have NOT CHANGED    Details   atorvastatin (LIPITOR) 80 MG  "tablet Take 1 tablet (80 mg total) by mouth once daily.  Qty: 30 tablet, Refills: 11      blood sugar diagnostic Strp Strips and lancets    Use before meals and bedtime  Qty: 150 strip, Refills: 12      ibuprofen (ADVIL,MOTRIN) 800 MG tablet Take 1 tablet (800 mg total) by mouth 3 (three) times daily as needed for Pain.  Qty: 50 tablet, Refills: 0      lisinopril (PRINIVIL,ZESTRIL) 40 MG tablet Take 1 tablet (40 mg total) by mouth once daily.  Qty: 30 tablet, Refills: 11      metFORMIN (GLUCOPHAGE) 1000 MG tablet Take 1 tablet (1,000 mg total) by mouth 2 (two) times daily with meals.  Qty: 60 tablet, Refills: 6    Associated Diagnoses: Type 2 diabetes mellitus with hyperglycemia, with long-term current use of insulin      pen needle, diabetic 31 gauge x 3/16" Ndle Inject 1 each into the skin 3 (three) times daily before meals.  Qty: 100 each, Refills: 12    Associated Diagnoses: Type 2 diabetes mellitus without complication, with long-term current use of insulin         STOP taking these medications       aspirin 81 MG Chew Comments:   Reason for Stopping:             I certify that this patient is confined to his home and needs intermittent skilled nursing care, physical therapy and occupational therapy.    -------------------------------------------------------------------------------------------------------------------  Electronically signed by:    Millicent Torre MD  Department of Internal Medicine  [facility]  "

## 2018-06-11 NOTE — SUBJECTIVE & OBJECTIVE
Interval History:  No acute events overnight. Afebrile, no leukocytosis. Pain controlled. No complaints.  Repeat wound cultures of 6/8 also showing GBS. Tolerating Cefazolin. PICC placed. He is eager to be discharged.     Review of Systems   Constitutional: Negative for chills, diaphoresis and fever.   Respiratory: Negative for cough and shortness of breath.    Cardiovascular: Positive for leg swelling. Negative for chest pain.   Gastrointestinal: Negative for abdominal pain, diarrhea, nausea and vomiting.   Genitourinary: Negative for difficulty urinating, dysuria, frequency and hematuria.   Musculoskeletal: Positive for arthralgias (right foot) and joint swelling. Negative for back pain.   Skin: Positive for color change (redness RLE) and wound. Negative for rash.   Neurological: Negative for weakness and headaches.   Hematological: Negative for adenopathy.   Psychiatric/Behavioral: Negative for confusion. The patient is not nervous/anxious.    All other systems reviewed and are negative.    Objective:     Vital Signs (Most Recent):  Temp: 98 °F (36.7 °C) (06/11/18 1201)  Pulse: (!) 56 (06/11/18 1201)  Resp: 18 (06/11/18 1201)  BP: 126/77 (06/11/18 1201)  SpO2: 97 % (06/11/18 1201) Vital Signs (24h Range):  Temp:  [98 °F (36.7 °C)-98.9 °F (37.2 °C)] 98 °F (36.7 °C)  Pulse:  [56-67] 56  Resp:  [16-18] 18  SpO2:  [94 %-98 %] 97 %  BP: (110-135)/(57-77) 126/77     Weight: 122.5 kg (270 lb)  Body mass index is 35.62 kg/m².    Estimated Creatinine Clearance: 128.6 mL/min (based on SCr of 0.9 mg/dL).    Physical Exam   Constitutional: He is oriented to person, place, and time. He appears well-developed and well-nourished. No distress.   HENT:   Head: Normocephalic and atraumatic.   Eyes: Conjunctivae are normal. No scleral icterus.   Cardiovascular: Normal rate, regular rhythm and intact distal pulses.    No murmur heard.  Pulmonary/Chest: Effort normal. No respiratory distress. He has no wheezes.   Abdominal: Soft.  He exhibits no distension. There is no tenderness. There is no guarding.   Musculoskeletal: He exhibits edema and tenderness (right foot).   Wound examined with Podiatry.    Right dorsal foot wound with sutures intact - open wound distal foot at base of toes. Does not probe to bone.   Serous drainage only, no gross purulence. Still with some surrounding erythema to dorsum of foot, no warmth, appears to be slowly improving. 2nd toe duskiness improved.     Right plantar foot wound - sutures intact proximally with open wound distally.  Serous drainage only. Does not probe to bone.    Neurological: He is alert and oriented to person, place, and time.   Skin: Skin is warm and dry. He is not diaphoretic. There is erythema.   Psychiatric: He has a normal mood and affect. His behavior is normal. Thought content normal.               Significant Labs:   Blood Culture:     Recent Labs  Lab 06/01/18  1445 06/02/18  2111 06/02/18  2115 06/06/18  0950   LABBLOO No growth after 5 days. No growth after 5 days. No growth after 5 days. No Growth to date  No Growth to date  No Growth to date  No Growth to date  No Growth to date     CBC:     Recent Labs  Lab 06/10/18  0500 06/11/18  0540   WBC 9.53 10.20   HGB 11.3* 12.5*   HCT 34.5* 36.8*    367*     CMP:     Recent Labs  Lab 06/10/18  0500 06/11/18  0540   * 136   K 4.4 4.4    101   CO2 27 26   * 189*   BUN 11 12   CREATININE 0.9 0.9   CALCIUM 8.9 8.8   PROT 6.8 7.0   ALBUMIN 2.2* 2.3*   BILITOT 0.4 0.4   ALKPHOS 87 88   AST 23 26   ALT 20 21   ANIONGAP 8 9   EGFRNONAA >60.0 >60.0     Wound Culture:     Recent Labs  Lab 06/02/18  1218 06/02/18  1226 06/08/18  1732   LABAERO STREPTOCOCCUS AGALACTIAE (GROUP B)ManyBeta-hemolytic streptococci are routinely susceptible to penicillins,cephalosporins and carbapenems.Susceptibility testing not routinely performed STREPTOCOCCUS AGALACTIAE (GROUP B)ManyBeta-hemolytic streptococci are routinely susceptible to  penicillins,cephalosporins and carbapenems.Susceptibility testing not routinely performed STREPTOCOCCUS AGALACTIAE (GROUP B)FewBeta-hemolytic streptococci are routinely susceptible to penicillins,cephalosporins and carbapenems.       Significant Imaging: I have reviewed all pertinent imaging results/findings within the past 24 hours.

## 2018-06-11 NOTE — PLAN OF CARE
Problem: Patient Care Overview  Goal: Plan of Care Review  Outcome: Ongoing (interventions implemented as appropriate)  Plan of care reviewed. Pt set to DC. Waiting on wound vac and boot to be delivered to bedside. SW also working on HH and home  IV abx orders. Pt has been informed of delays.

## 2018-06-11 NOTE — PROGRESS NOTES
[facility] Hospital Medicine  Progress Note    Patient Name: Billy Sheffield Miguel  MRN: [id number]  Patient Class: IP- Inpatient   Admission Date: 6/1/2018  Length of Stay: 10 days  Attending Physician: Josiah Sawyer, *  Primary Care Provider: BRAD Baker MD    [facility] Medicine Team: [facility] 2 Millicent Torre MD    Subjective:     Principal Problem:Diabetic ulcer of right midfoot    HPI:  Patient is a 52-year-old male with HTN, MI 2013 s/p 1 stent, DMT2, colon cancer s/p resection 2017 who presents after failing outpatient treatment for a right ulcer.  Patient states that he stepped on a piece of glass at work where he is  on the 19th of May and it went through his shoe. At that time he removed the glass, used antiseptic and antibacterial on his foot and felt that things are going OK. He didn't have any issues until last Saturday The 26th, when he noted that a puffy cherry blister was occurring on his plantar surface near the first digit. On Tuesday, the 29th he presented to his primary care physician in [place] where he was started on clinda and cipro. Patient notes that the clinda and Cipro gave him diarrhea and vomiting. He stated that he had diarrhea three times a day with some form, mostly liquid, no blood and it has been the same since it started. His vomiting occurred five times yesterday, and was brownish with food. His last diarrhea episode was at 10:30 this morning, and vomiting at 4:30 in the afternoon. The patient presented to his outside PCP today, without relief of symptoms on antibiotics. Patient denies fever, chills, nausea, vomiting, diarrhea, dysuria, rash, shortness of breath, chest pain, abdominal pain.  Patient states that his diabetes started 15 to 20 years ago, and has been reasonably well controlled since then with no neuropathy, optic issues or kidney issues. Patient notes that he had a stent in 2013 for coronary artery disease, and colon  cancer that was resected in April 2017 without any issues on follow up.    Hospital Course:  6/1/2018- admission date. Empirically started on daptomycin due to PCN allergy, aztreonam, flagyl for anaerobe coverage. Diarrhea resolved on admission, thought to be side effect of outpatient abx rather than c diff.  6/2/2018- Received OR debridement by podiatry. Tolerated procedure well. Had fever of 101.9 overnight. WBC persistently ~13. VASILIY 1.14 in LLE. RLE VASILIY unable to be obtained due to wound/infection.  6/3/2018- Aerobic wound culture grew strep agalactiae. Per ID recs, abx switched to cefazolin with no adverse effects. Fever of 101.4. Repeat XR of foot showed residual piece of glass in foot.  06/04/2018: Had not had any BM since admission. Was started on bowel regimen. However, pt start having diarrhea again, 4 episodes during the day, before receiving any bowel regimen. Lactulose and miralax were not given. Pt received 1 dose of senna-docusate overnight. Had 1 more episode of diarrhea overnight. Persistent hyponatremia with Na+ in low 130s. Urine studies showed low-normal urine sodium and normal osmolality. Fever of 101 recurred for 8 hours, resolved with tylenol. Pt denied symptoms during febrile episode. WBC slightly improved, left shift persisted. Thought to be due to residual foreign object embedded in foot.  06/05/2018: Recurrence of diarrhea thought to be secondary to antibiotics, given similar presentation while pt was previously on PO abx prior to admission. Started on probiotics. Stool became formed later in the day. Went to OR again for removal of remaining piece of glass in foot. Found to have purulent drainage from wound, which was debrided. However, glass was found to be too deeply embedded in tissue and was unable to be retrieved despite multiple attempts. Post op XR of foot showed soft tissue swelling and gas. Immediately post op, pt's fever recurred with temp up to 102 for 8 hours  overnight.  6/6/2018: reports fever and fatigue. Denies chills, myalgia, cough, sore throat, dysuria, WBC stable at 13. Denies foot pain or bleeding. Hgb stable. Right second toe noted to be blue/pale on exam. Thought to be bruising from pressure applied on toe in OR during debridement of foot vs ischemia (given unknown RLE VASILIY). Monitored for swelling, per podiatry recs.  06/07/2018: had fever of 101.1 F overnight that resolved with 1 dose of PO tylenol. VS otherwise stable. Pain controlled with oxycodone IR 5 mg x 1. WBC improved to 10. No BM since I/D two days ago. Went to OR again for I/D in attempt to remove residual piece of glass in wound, which successfully removed large piece of glass. Wound cultures were sent. Unfortunately, there was one small piece (<1 mm in size) residual in wound, which was confirmed on XR post op. Underwent BLE arterial angiogram for ischemic evaluation which showed 3 vessel run off.  06/08/2018: remained afebrile. WBC remained normal. Per ID recs, continued on IV abx for 6 week duration for presumed osteomyelitis, given proximity of foreign object/extent of wound to bone. PICC line was placed, confirmed by CXR. No BM since 6/5/18. Started on metamucil.  06/09/2018: remained afebrile with normal WBC. Blood cultures from 6/2/18 and 6/6/18 remained negative. Pain controlled with prn opiates. Started on senna-doc.  06/10/2018: remained afebrile with Tmax 100 F. HR and BP at goal, WBC wnl. Hgb remained stable, no signs of bleeding post op. Pain controlled, independently ambulated from bed to bathroom on heel of R foot, able to bear some weight over site of wound. R foot bandage changed daily. R second toe bruising improving. R leg cellulitis resolving. Had 1 formed stool yesterday and 1 this morning. Refused metamucil and senna-doc. BG in 140s-220s. Insulin regimen was increased.  06/11/2018: remained afebrile with Tm 98.9 F. Pain controlled, did not need any prn opiates for the past 3  days. No signs/sx of bleeding from wound. Resumed on ASA 81 mg daily. Continued to have normal BM without taking metamucil and senna-doc. BG at lunch time, dinner time, bed time, and overnight improved to 180-200 on higher dose of insulin. Recurrent hypomagnesemia, corrected with IV supplement. Discharged home with probiotics, bowel regimen, PO Mg+2 oxide supplement, new insulin regimen (58 units qhs, 20 units with breakfast, 23 units with lunch, 23 units with dinner), and 6 week course of cefazolin (via ). Plan to follow up with podiatry, ID, and allergy (for evaluation of penicillin allergy).    Interval History:   NAEON. Denies pain from surgical site. Did not use any prn pain meds. Continues to have formed BM, did not take metamucil or senna-doc for the past 2 days.    Review of Systems   Constitutional: Negative for chills, fever and unexpected weight change.   HENT: Negative for hearing loss.    Eyes: Negative for visual disturbance.   Respiratory: Negative for cough and shortness of breath.    Cardiovascular: Negative for chest pain, palpitations and leg swelling.   Gastrointestinal: Negative for abdominal distention, abdominal pain, blood in stool, constipation, diarrhea, nausea, rectal pain and vomiting.   Genitourinary: Negative for dysuria and hematuria.   Musculoskeletal: Negative for arthralgias.   Skin: Positive for color change, rash and wound.   Neurological: Negative for seizures, weakness and headaches.   Hematological: Negative for adenopathy. Does not bruise/bleed easily.     Objective:     Vital Signs (Most Recent):  Temp: 98.2 °F (36.8 °C) (06/11/18 1529)  Pulse: 60 (06/11/18 1529)  Resp: 18 (06/11/18 1529)  BP: 136/70 (06/11/18 1529)  SpO2: 98 % (06/11/18 1529) Vital Signs (24h Range):  Temp:  [98 °F (36.7 °C)-98.6 °F (37 °C)] 98.2 °F (36.8 °C)  Pulse:  [56-67] 60  Resp:  [16-18] 18  SpO2:  [94 %-98 %] 98 %  BP: (115-136)/(57-77) 136/70     Weight: 122.5 kg (270 lb)  Body mass index is 35.62  kg/m².  No intake or output data in the 24 hours ending 06/11/18 1635     Physical Exam   Constitutional: He is oriented to person, place, and time. He appears well-developed and well-nourished. No distress.   HENT:   Head: Normocephalic and atraumatic.   Eyes: Conjunctivae, EOM and lids are normal.   Neck: No tracheal deviation present. No thyromegaly present.   Cardiovascular: Normal rate, regular rhythm, normal heart sounds and intact distal pulses.    No murmur heard.  Pulmonary/Chest: Effort normal and breath sounds normal. No respiratory distress. He has no wheezes. He has no rales.   Abdominal: Soft. He exhibits no distension and no mass. There is no tenderness. No hernia.   Musculoskeletal: He exhibits tenderness. He exhibits no edema.   Right foot bandage, intact, clean, dry. R second toe discoloration improving.   Neurological: He is alert and oriented to person, place, and time. No cranial nerve deficit. He exhibits normal muscle tone.   Skin: Skin is warm and dry. Rash noted. He is not diaphoretic. There is erythema.   RLE erythema up to ankle, reduced in area from marked border on admission. Non tender, non edematous.   Psychiatric: He has a normal mood and affect.   Vitals reviewed.    Significant Labs: All pertinent labs within the past 24 hours have been reviewed.  CMP:   6/11/2018 05:40   Sodium 136   Potassium 4.4   Chloride 101   CO2 26   Anion Gap 9   BUN, Bld 12   Creatinine 0.9   eGFR if non African American >60.0   eGFR if African American >60.0   Glucose 189 (H)   Calcium 8.8   Phosphorus 3.7   Magnesium 1.3 (L)   Alkaline Phosphatase 88   Total Protein 7.0   Albumin 2.3 (L)   Total Bilirubin 0.4   AST 26   ALT 21     CBC:   6/11/2018 05:40   WBC 10.20   RBC 4.21 (L)   Hemoglobin 12.5 (L)   Hematocrit 36.8 (L)   MCV 87   MCH 29.7   MCHC 34.0   RDW 12.1   Platelets 367 (H)   MPV 9.5   Gran% 66.6   Gran # (ANC) 6.8   Immature Granulocytes 2.6 (H)   Immature Grans (Abs) 0.27 (H)   Lymph% 17.5  (L)   Lymph # 1.8   Mono% 10.1   Mono # 1.0   Eosinophil% 2.6   Eos # 0.3   Basophil% 0.6   Baso # 0.06   nRBC 0     DM panel:   6/10/2018 12:47 6/10/2018 17:38 6/10/2018 23:26 6/11/2018 03:08 6/11/2018 09:36 6/11/2018 13:18   POCT Glucose 200 (H) 179 (H) 200 (H) 186 (H) 218 (H) 137 (H)     Significant Imaging: I have reviewed and interpreted all pertinent imaging results/findings within the past 24 hours.   No new imaging.    Assessment/Plan:      * Diabetic ulcer of right midfoot    Cellulitis  R foot wound with foreign object (glass)  - S/p OR debridement by podiatry on day 2. Aerobic wound culture on 6/2/18 grew strep agalactiae. Anaerobic culture pending. No signs of osteomyelitis. Blood culture NGTD. WBC persistently 13. Repeat XR foot showed residual piece of glass in wound. Developed recurrent fevers with temp up to 102 F. Blood cultures from 6/2/18 showed no growth. Multiple unsuccessful attempts to retrieve remaining piece of glass in foot in OR on day 5. Continued to have fever afterwards, consistent with lack of source control. Blood cultures on 6/6/18 negative. Repeat I/D on day 7 had successful removal of large piece of residual glass. However, one tiny residual piece remained, confirmed by XR post op. Wound cultures from OR again grew group B strep. Fever and WBC resolved afterwards. Did not require any prn opiates for pain post op.  - Currently asymptomatic. Cellulitis of R leg improving.  - Continue cefazolin 2 g IV q8h for 6 week course, for osteomyelitis coverage, given proximity of foreign object to bone (started on day 3, end date 7/14/18). S/p aztreonam, flagyl, daptomycin initially. PICC line placed on 6/8/18 for prolonged abx course.  - Discharge home with wound vac, per podiatry.  - Follow up with podiatry and ID in clinic.        Type 2 diabetes mellitus 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, with long-term current use of insulin    -  Uncontrolled outpatient with hgb A1C of 12, 's, and anion gap 14 on admission (previously 9.7 in 2/2018). On insulin glargine 55 units, aspart 20 units TIDWM, metformin, trulicity at home.  - BG at lunch time, dinner time, bedtime, and overnight improving to 170-200 on higher dose insulin.  - Continue insulin detemir to 58 units daily.  - Continue insulin aspart 20 units with breakfast.  - Continue insulin aspart to 23 units with lunch and dinner.  - Continue SSI.  - POCT glucose AC & HS.  - Holding home metformin and trulicity while in house.        HTN (hypertension)    - On lisinopril 40 mg daily and carvedilol 12.5 mg BID at home.  - BP currently at goal.  - Continue home ACE-I.  - Continue home BB.  - Monitor.        CAD s/p PCI of LAD (SHELBY) in May 2013    - On ASA 81 mg daily and atorvastatin 80 mg daily at home.  - Home ASA held on admission in the setting of pending surgery.  - Remained stable and asymptomatic throughout course.  - Resume home ASA.  - Continue home statin.        Dyslipidemia    - On atorvastatin 80 mg daily at home.  - Continue home statin.          VTE Risk Mitigation         Ordered     enoxaparin injection 40 mg  Daily      06/05/18 1706     IP VTE HIGH RISK PATIENT  Once      06/02/18 7596        Millicent Torre MD  Department of Hospital Medicine   [facility]

## 2018-06-11 NOTE — ASSESSMENT & PLAN NOTE
Cellulitis  R foot wound with foreign object (glass)  - S/p OR debridement by podiatry on day 2. Aerobic wound culture on 6/2/18 grew strep agalactiae. Anaerobic culture pending. No signs of osteomyelitis. Blood culture NGTD. WBC persistently 13. Repeat XR foot showed residual piece of glass in wound. Developed recurrent fevers with temp up to 102 F. Blood cultures from 6/2/18 showed no growth. Multiple unsuccessful attempts to retrieve remaining piece of glass in foot in OR on day 5. Continued to have fever afterwards, consistent with lack of source control. Blood cultures on 6/6/18 negative. Repeat I/D on day 7 had successful removal of large piece of residual glass. However, one tiny residual piece remained, confirmed by XR post op. Wound cultures from OR again grew group B strep. Fever and WBC resolved afterwards. Did not require any prn opiates for pain post op.  - Currently asymptomatic. Cellulitis of R leg improving.  - Continue cefazolin 2 g IV q8h for 6 week course, for osteomyelitis coverage, given proximity of foreign object to bone (started on day 3, end date 7/14/18). S/p aztreonam, flagyl, daptomycin initially. PICC line placed on 6/8/18 for prolonged abx course.  - Discharge home with wound vac, per podiatry.  - Follow up with podiatry and ID in clinic.

## 2018-06-11 NOTE — PLAN OF CARE
Problem: Patient Care Overview  Goal: Plan of Care Review  Outcome: Ongoing (interventions implemented as appropriate)  Greeted patient and gained consent for nursing care. Awake, alert, oriented. Ambulates to the restroom independently. Assisted in his needs. Bed in lowest position, locked. Call bell within reach. Regularly checked on him during the night. Will continue to monitor.

## 2018-06-11 NOTE — PLAN OF CARE
"Dipika placed wound vac form in Pt's chart to be completed, Sw to follow with completion of form, IV abx and hh. Met with Pt, informed him of the above, form was completed by Podiatry, medical student to gather form and provide to Dipika for Dipika to fax to [facility]. Dipika to follow. Dipika informed by Mi that their "agency is out of network for Pt." Dipika referred to [facility] at , Dipika faxed to Ginger face sheet, h & p, ID note, podiatry note and hh orders to . Dipika also completed and fax wound vac form from [facility] to , Sw to follow with all above, Pt will need hh for wound vac changes. [facility]inger with [facility] "cannot service Pt as he is outside their service area." SAULO Duran aware and working with new infusion company [facility] ? Sw to follow. [facility] has accepted Pt, Ami, liaison with a , will attempt to locate a hh provider for wound vac changes as pt will go to their suite for PICC care. Dipika to follow with KC and hh in the am.   "

## 2018-06-11 NOTE — ASSESSMENT & PLAN NOTE
52-year-old male admitted with right foot infection after penetrating glass trauma that failed outpatient antibiotics alone. In the ED patient was noted to have a fever, WBC of 15K, , . MRI showed diffuse subcutaneous and myofascial forefoot edema, fluid collection with associated radiopaque foreign body. No osteomyelitis. He was taken to the OR 6/2, 6/5, and 6/7  for washout with podiatry. Copious amounts of purulent drainage noted with tracking to dorsal foot. Surgical cultures grew GBS. Foreign body was found at 1st interdigital space but unable to remove. Large piece of glass removed  6/7.  Small piece of residual glass left in soft tissue and per Podiatry, it is likely so small as to be irretrievable.  Purulence and liquid necrosis was encounterd in the OR.  No surgical cultures were obtained during surgery on 6/5 or 6/7, but new bedside cultures obtained 6/9 also showing GBS. Patient underwent angiogram by interventional cardiology on 6/7 given dusky appearance of 2nd toe - good blood flow.      Patient remains IV Cefazolin. Afebrile over the last 72+  hours. No leukocytosis. Clinically stable/non septic appearing. Still with erythema to dorsal foot, but it does appear to be improving.    Plan  - Given severity of infection and close involvement of bone, would plan to treat as presumed osteomyelitis  - Recommend Cefazolin 6 g IV continuous infusion q 24 hours x 6 weeks from last  washout (end date 7/19/18)  - Patient will need ESR, CRP, CBC and CMP to be drawn weekly with results faxed to the ID Department at  [phone]  - Concerned that infection will not clear without removal of retained glass fragment. Will need to monitor closely as an outpatient for any clinical worsening.   - Recommend Allergy follow up as an outpatient for PCN skin testing   - Will arrange ID follow up within a few weeks of discharge.  - ID will sign off.

## 2018-06-11 NOTE — SUBJECTIVE & OBJECTIVE
Interval History:   NAEON. Denies pain from surgical site. Did not use any prn pain meds. Continues to have formed BM, did not take metamucil or senna-doc for the past 2 days.    Review of Systems   Constitutional: Negative for chills, fever and unexpected weight change.   HENT: Negative for hearing loss.    Eyes: Negative for visual disturbance.   Respiratory: Negative for cough and shortness of breath.    Cardiovascular: Negative for chest pain, palpitations and leg swelling.   Gastrointestinal: Negative for abdominal distention, abdominal pain, blood in stool, constipation, diarrhea, nausea, rectal pain and vomiting.   Genitourinary: Negative for dysuria and hematuria.   Musculoskeletal: Negative for arthralgias.   Skin: Positive for color change, rash and wound.   Neurological: Negative for seizures, weakness and headaches.   Hematological: Negative for adenopathy. Does not bruise/bleed easily.     Objective:     Vital Signs (Most Recent):  Temp: 98.2 °F (36.8 °C) (06/11/18 1529)  Pulse: 60 (06/11/18 1529)  Resp: 18 (06/11/18 1529)  BP: 136/70 (06/11/18 1529)  SpO2: 98 % (06/11/18 1529) Vital Signs (24h Range):  Temp:  [98 °F (36.7 °C)-98.6 °F (37 °C)] 98.2 °F (36.8 °C)  Pulse:  [56-67] 60  Resp:  [16-18] 18  SpO2:  [94 %-98 %] 98 %  BP: (115-136)/(57-77) 136/70     Weight: 122.5 kg (270 lb)  Body mass index is 35.62 kg/m².  No intake or output data in the 24 hours ending 06/11/18 1635     Physical Exam   Constitutional: He is oriented to person, place, and time. He appears well-developed and well-nourished. No distress.   HENT:   Head: Normocephalic and atraumatic.   Eyes: Conjunctivae, EOM and lids are normal.   Neck: No tracheal deviation present. No thyromegaly present.   Cardiovascular: Normal rate, regular rhythm, normal heart sounds and intact distal pulses.    No murmur heard.  Pulmonary/Chest: Effort normal and breath sounds normal. No respiratory distress. He has no wheezes. He has no rales.    Abdominal: Soft. He exhibits no distension and no mass. There is no tenderness. No hernia.   Musculoskeletal: He exhibits tenderness. He exhibits no edema.   Right foot bandage, intact, clean, dry. R second toe discoloration improving.   Neurological: He is alert and oriented to person, place, and time. No cranial nerve deficit. He exhibits normal muscle tone.   Skin: Skin is warm and dry. Rash noted. He is not diaphoretic. There is erythema.   RLE erythema up to ankle, reduced in area from marked border on admission. Non tender, non edematous.   Psychiatric: He has a normal mood and affect.   Vitals reviewed.    Significant Labs: All pertinent labs within the past 24 hours have been reviewed.  CMP:   6/11/2018 05:40   Sodium 136   Potassium 4.4   Chloride 101   CO2 26   Anion Gap 9   BUN, Bld 12   Creatinine 0.9   eGFR if non African American >60.0   eGFR if African American >60.0   Glucose 189 (H)   Calcium 8.8   Phosphorus 3.7   Magnesium 1.3 (L)   Alkaline Phosphatase 88   Total Protein 7.0   Albumin 2.3 (L)   Total Bilirubin 0.4   AST 26   ALT 21     CBC:   6/11/2018 05:40   WBC 10.20   RBC 4.21 (L)   Hemoglobin 12.5 (L)   Hematocrit 36.8 (L)   MCV 87   MCH 29.7   MCHC 34.0   RDW 12.1   Platelets 367 (H)   MPV 9.5   Gran% 66.6   Gran # (ANC) 6.8   Immature Granulocytes 2.6 (H)   Immature Grans (Abs) 0.27 (H)   Lymph% 17.5 (L)   Lymph # 1.8   Mono% 10.1   Mono # 1.0   Eosinophil% 2.6   Eos # 0.3   Basophil% 0.6   Baso # 0.06   nRBC 0     DM panel:   6/10/2018 12:47 6/10/2018 17:38 6/10/2018 23:26 6/11/2018 03:08 6/11/2018 09:36 6/11/2018 13:18   POCT Glucose 200 (H) 179 (H) 200 (H) 186 (H) 218 (H) 137 (H)     Significant Imaging: I have reviewed and interpreted all pertinent imaging results/findings within the past 24 hours.   No new imaging.

## 2018-06-12 LAB
ALBUMIN SERPL BCP-MCNC: 2.3 G/DL
ALP SERPL-CCNC: 89 U/L
ALT SERPL W/O P-5'-P-CCNC: 16 U/L
ANION GAP SERPL CALC-SCNC: 9 MMOL/L
AST SERPL-CCNC: 23 U/L
BACTERIA SPEC ANAEROBE CULT: NORMAL
BASOPHILS # BLD AUTO: 0.04 K/UL
BASOPHILS NFR BLD: 0.4 %
BILIRUB SERPL-MCNC: 0.4 MG/DL
BUN SERPL-MCNC: 14 MG/DL
CALCIUM SERPL-MCNC: 8.7 MG/DL
CHLORIDE SERPL-SCNC: 99 MMOL/L
CO2 SERPL-SCNC: 26 MMOL/L
CREAT SERPL-MCNC: 0.9 MG/DL
DIFFERENTIAL METHOD: ABNORMAL
EOSINOPHIL # BLD AUTO: 0.3 K/UL
EOSINOPHIL NFR BLD: 2.7 %
ERYTHROCYTE [DISTWIDTH] IN BLOOD BY AUTOMATED COUNT: 12.1 %
EST. GFR  (AFRICAN AMERICAN): >60 ML/MIN/1.73 M^2
EST. GFR  (NON AFRICAN AMERICAN): >60 ML/MIN/1.73 M^2
GLUCOSE SERPL-MCNC: 265 MG/DL
HCT VFR BLD AUTO: 36.1 %
HGB BLD-MCNC: 12.2 G/DL
IMM GRANULOCYTES # BLD AUTO: 0.19 K/UL
IMM GRANULOCYTES NFR BLD AUTO: 2 %
LYMPHOCYTES # BLD AUTO: 1.8 K/UL
LYMPHOCYTES NFR BLD: 19.2 %
MAGNESIUM SERPL-MCNC: 1.7 MG/DL
MCH RBC QN AUTO: 29.6 PG
MCHC RBC AUTO-ENTMCNC: 33.8 G/DL
MCV RBC AUTO: 88 FL
MONOCYTES # BLD AUTO: 0.9 K/UL
MONOCYTES NFR BLD: 9.7 %
NEUTROPHILS # BLD AUTO: 6.3 K/UL
NEUTROPHILS NFR BLD: 66 %
NRBC BLD-RTO: 0 /100 WBC
PHOSPHATE SERPL-MCNC: 3.4 MG/DL
PLATELET # BLD AUTO: 375 K/UL
PMV BLD AUTO: 8.9 FL
POCT GLUCOSE: 150 MG/DL (ref 70–110)
POCT GLUCOSE: 212 MG/DL (ref 70–110)
POCT GLUCOSE: 222 MG/DL (ref 70–110)
POCT GLUCOSE: 236 MG/DL (ref 70–110)
POTASSIUM SERPL-SCNC: 4.6 MMOL/L
PROT SERPL-MCNC: 6.9 G/DL
RBC # BLD AUTO: 4.12 M/UL
SODIUM SERPL-SCNC: 134 MMOL/L
WBC # BLD AUTO: 9.52 K/UL

## 2018-06-12 PROCEDURE — 63600175 PHARM REV CODE 636 W HCPCS: Performed by: STUDENT IN AN ORGANIZED HEALTH CARE EDUCATION/TRAINING PROGRAM

## 2018-06-12 PROCEDURE — 80053 COMPREHEN METABOLIC PANEL: CPT

## 2018-06-12 PROCEDURE — 85025 COMPLETE CBC W/AUTO DIFF WBC: CPT

## 2018-06-12 PROCEDURE — 63600175 PHARM REV CODE 636 W HCPCS: Performed by: PHYSICIAN ASSISTANT

## 2018-06-12 PROCEDURE — 99232 SBSQ HOSP IP/OBS MODERATE 35: CPT | Mod: ,,, | Performed by: HOSPITALIST

## 2018-06-12 PROCEDURE — A4216 STERILE WATER/SALINE, 10 ML: HCPCS | Performed by: STUDENT IN AN ORGANIZED HEALTH CARE EDUCATION/TRAINING PROGRAM

## 2018-06-12 PROCEDURE — 25000003 PHARM REV CODE 250: Performed by: STUDENT IN AN ORGANIZED HEALTH CARE EDUCATION/TRAINING PROGRAM

## 2018-06-12 PROCEDURE — 84100 ASSAY OF PHOSPHORUS: CPT

## 2018-06-12 PROCEDURE — 97116 GAIT TRAINING THERAPY: CPT

## 2018-06-12 PROCEDURE — 11000001 HC ACUTE MED/SURG PRIVATE ROOM

## 2018-06-12 PROCEDURE — 83735 ASSAY OF MAGNESIUM: CPT

## 2018-06-12 RX ADMIN — INSULIN ASPART 23 UNITS: 100 INJECTION, SOLUTION INTRAVENOUS; SUBCUTANEOUS at 01:06

## 2018-06-12 RX ADMIN — ATORVASTATIN CALCIUM 80 MG: 20 TABLET, FILM COATED ORAL at 08:06

## 2018-06-12 RX ADMIN — Medication 400 MG: at 09:06

## 2018-06-12 RX ADMIN — CEFAZOLIN 2 G: 1 INJECTION, POWDER, FOR SOLUTION INTRAMUSCULAR; INTRAVENOUS at 06:06

## 2018-06-12 RX ADMIN — ASPIRIN 81 MG: 81 TABLET, COATED ORAL at 08:06

## 2018-06-12 RX ADMIN — CEFAZOLIN 2 G: 1 INJECTION, POWDER, FOR SOLUTION INTRAMUSCULAR; INTRAVENOUS at 01:06

## 2018-06-12 RX ADMIN — Medication 400 MG: at 08:06

## 2018-06-12 RX ADMIN — INSULIN ASPART 20 UNITS: 100 INJECTION, SOLUTION INTRAVENOUS; SUBCUTANEOUS at 08:06

## 2018-06-12 RX ADMIN — SODIUM CHLORIDE, PRESERVATIVE FREE 5 ML: 5 INJECTION INTRAVENOUS at 06:06

## 2018-06-12 RX ADMIN — Medication 1 CAPSULE: at 08:06

## 2018-06-12 RX ADMIN — CARVEDILOL 12.5 MG: 12.5 TABLET, FILM COATED ORAL at 08:06

## 2018-06-12 RX ADMIN — LISINOPRIL 40 MG: 20 TABLET ORAL at 08:06

## 2018-06-12 RX ADMIN — ENOXAPARIN SODIUM 40 MG: 100 INJECTION SUBCUTANEOUS at 04:06

## 2018-06-12 RX ADMIN — CEFAZOLIN 2 G: 1 INJECTION, POWDER, FOR SOLUTION INTRAMUSCULAR; INTRAVENOUS at 09:06

## 2018-06-12 RX ADMIN — CARVEDILOL 12.5 MG: 12.5 TABLET, FILM COATED ORAL at 09:06

## 2018-06-12 NOTE — PROGRESS NOTES
[facility] Hospital Medicine  Progress Note    Patient Name: Billy Sheffield Miguel  MRN: [id number]  Patient Class: IP- Inpatient   Admission Date: 6/1/2018  Length of Stay: 11 days  Attending Physician: Shree Arita MD  Primary Care Provider: BRAD Baker MD    [facility] Medicine Team: [facility] 2 Millicent Torre MD    Subjective:     Principal Problem:Diabetic ulcer of right midfoot    HPI:  Patient is a 52-year-old male with HTN, MI 2013 s/p 1 stent, DMT2, colon cancer s/p resection 2017 who presents after failing outpatient treatment for a right ulcer.  Patient states that he stepped on a piece of glass at work where he is  on the 19th of May and it went through his shoe. At that time he removed the glass, used antiseptic and antibacterial on his foot and felt that things are going OK. He didn't have any issues until last Saturday The 26th, when he noted that a puffy cherry blister was occurring on his plantar surface near the first digit. On Tuesday, the 29th he presented to his primary care physician in [place] where he was started on clinda and cipro. Patient notes that the clinda and Cipro gave him diarrhea and vomiting. He stated that he had diarrhea three times a day with some form, mostly liquid, no blood and it has been the same since it started. His vomiting occurred five times yesterday, and was brownish with food. His last diarrhea episode was at 10:30 this morning, and vomiting at 4:30 in the afternoon. The patient presented to his outside PCP today, without relief of symptoms on antibiotics. Patient denies fever, chills, nausea, vomiting, diarrhea, dysuria, rash, shortness of breath, chest pain, abdominal pain.  Patient states that his diabetes started 15 to 20 years ago, and has been reasonably well controlled since then with no neuropathy, optic issues or kidney issues. Patient notes that he had a stent in 2013 for coronary artery disease, and colon cancer  that was resected in April 2017 without any issues on follow up.    Hospital Course:  6/1/2018- admission date. Empirically started on daptomycin due to PCN allergy, aztreonam, flagyl for anaerobe coverage. Diarrhea resolved on admission, thought to be side effect of outpatient abx rather than c diff.  6/2/2018- Received OR debridement by podiatry. Tolerated procedure well. Had fever of 101.9 overnight. WBC persistently ~13. VASILIY 1.14 in LLE. RLE VASILIY unable to be obtained due to wound/infection.  6/3/2018- Aerobic wound culture grew strep agalactiae. Per ID recs, abx switched to cefazolin with no adverse effects. Fever of 101.4. Repeat XR of foot showed residual piece of glass in foot.  06/04/2018: Had not had any BM since admission. Was started on bowel regimen. However, pt start having diarrhea again, 4 episodes during the day, before receiving any bowel regimen. Lactulose and miralax were not given. Pt received 1 dose of senna-docusate overnight. Had 1 more episode of diarrhea overnight. Persistent hyponatremia with Na+ in low 130s. Urine studies showed low-normal urine sodium and normal osmolality. Fever of 101 recurred for 8 hours, resolved with tylenol. Pt denied symptoms during febrile episode. WBC slightly improved, left shift persisted. Thought to be due to residual foreign object embedded in foot.  06/05/2018: Recurrence of diarrhea thought to be secondary to antibiotics, given similar presentation while pt was previously on PO abx prior to admission. Started on probiotics. Stool became formed later in the day. Went to OR again for removal of remaining piece of glass in foot. Found to have purulent drainage from wound, which was debrided. However, glass was found to be too deeply embedded in tissue and was unable to be retrieved despite multiple attempts. Post op XR of foot showed soft tissue swelling and gas. Immediately post op, pt's fever recurred with temp up to 102 for 8 hours overnight.  6/6/2018:  reports fever and fatigue. Denies chills, myalgia, cough, sore throat, dysuria, WBC stable at 13. Denies foot pain or bleeding. Hgb stable. Right second toe noted to be blue/pale on exam. Thought to be bruising from pressure applied on toe in OR during debridement of foot vs ischemia (given unknown RLE VASILIY). Monitored for swelling, per podiatry recs.  06/07/2018: had fever of 101.1 F overnight that resolved with 1 dose of PO tylenol. VS otherwise stable. Pain controlled with oxycodone IR 5 mg x 1. WBC improved to 10. No BM since I/D two days ago. Went to OR again for I/D in attempt to remove residual piece of glass in wound, which successfully removed large piece of glass. Wound cultures were sent. Unfortunately, there was one small piece (<1 mm in size) residual in wound, which was confirmed on XR post op. Underwent BLE arterial angiogram for ischemic evaluation which showed 3 vessel run off.  06/08/2018: remained afebrile. WBC remained normal. Per ID recs, continued on IV abx for 6 week duration for presumed osteomyelitis, given proximity of foreign object/extent of wound to bone. PICC line was placed, confirmed by CXR. No BM since 6/5/18. Started on metamucil.  06/09/2018: remained afebrile with normal WBC. Blood cultures from 6/2/18 and 6/6/18 remained negative. Pain controlled with prn opiates. Started on senna-doc.  06/10/2018: remained afebrile with Tmax 100 F. HR and BP at goal, WBC wnl. Hgb remained stable, no signs of bleeding post op. Pain controlled, independently ambulated from bed to bathroom on heel of R foot, able to bear some weight over site of wound. R foot bandage changed daily. R second toe bruising improving. R leg cellulitis resolving. Had 1 formed stool yesterday and 1 this morning. Refused metamucil and senna-doc. BG in 140s-220s. Insulin regimen was increased.  06/11/2018: remained afebrile with Tm 98.9 F. Pain controlled, did not need any prn opiates for the past 3 days. No signs/sx of  bleeding from wound. Resumed on ASA 81 mg daily. Continued to have normal BM without taking metamucil and senna-doc. BG at lunch time, dinner time, bed time, and overnight improved to 180-200 on higher dose of insulin. Recurrent hypomagnesemia, corrected with IV supplement.   06/12/2018: Remained medically stable. However, unable to discharge due to cost of wound vac at home and cost of IV abx. SW/CM evaluating pt for LTAC. Plan to discharge home with probiotics, bowel regimen, PO Mg+2 oxide supplement, new insulin regimen (58 units qhs, 20 units with breakfast, 23 units with lunch, 23 units with dinner), and 6 week total course of cefazolin (via ). Plan to follow up with podiatry, ID, and allergy (for evaluation of penicillin allergy).    Interval History:   NAEON. No fever or pain. Having BM daily.    Review of Systems   Constitutional: Negative for chills, fever and unexpected weight change.   HENT: Negative for hearing loss.    Eyes: Negative for visual disturbance.   Respiratory: Negative for cough and shortness of breath.    Cardiovascular: Negative for chest pain, palpitations and leg swelling.   Gastrointestinal: Negative for abdominal distention, abdominal pain, blood in stool, constipation, diarrhea, nausea, rectal pain and vomiting.   Genitourinary: Negative for dysuria and hematuria.   Musculoskeletal: Negative for arthralgias.   Skin: Positive for color change, rash and wound.   Neurological: Negative for seizures, weakness and headaches.   Hematological: Negative for adenopathy. Does not bruise/bleed easily.     Objective:     Vital Signs (Most Recent):  Temp: 98.6 °F (37 °C) (06/12/18 1155)  Pulse: 61 (06/12/18 1155)  Resp: 17 (06/12/18 0713)  BP: 112/63 (06/12/18 1155)  SpO2: 98 % (06/12/18 1155) Vital Signs (24h Range):  Temp:  [97.2 °F (36.2 °C)-99.8 °F (37.7 °C)] 98.6 °F (37 °C)  Pulse:  [55-63] 61  Resp:  [17-18] 17  SpO2:  [95 %-98 %] 98 %  BP: (106-136)/(55-75) 112/63     Weight: 122.5 kg  (270 lb)  Body mass index is 35.62 kg/m².    Intake/Output Summary (Last 24 hours) at 06/12/18 1425  Last data filed at 06/12/18 0638   Gross per 24 hour   Intake              530 ml   Output                0 ml   Net              530 ml      Physical Exam   Constitutional: He is oriented to person, place, and time. He appears well-developed and well-nourished. No distress.   HENT:   Head: Normocephalic and atraumatic.   Eyes: Conjunctivae, EOM and lids are normal.   Neck: No tracheal deviation present. No thyromegaly present.   Cardiovascular: Normal rate, regular rhythm, normal heart sounds and intact distal pulses.    No murmur heard.  Pulmonary/Chest: Effort normal and breath sounds normal. No respiratory distress. He has no wheezes. He has no rales.   Abdominal: Soft. He exhibits no distension and no mass. There is no tenderness. No hernia.   Musculoskeletal: He exhibits no edema or tenderness.   Right foot bandage, intact, clean, dry. R second toe discoloration improving.   Neurological: He is alert and oriented to person, place, and time. No cranial nerve deficit. He exhibits normal muscle tone.   Skin: Skin is warm and dry. Rash noted. He is not diaphoretic. There is erythema.   RLE erythema up to ankle, reduced in area from marked border on admission. Non tender, non edematous.   Psychiatric: He has a normal mood and affect.   Vitals reviewed.    Significant Labs: All pertinent labs within the past 24 hours have been reviewed.  CMP:   6/12/2018 04:18   Sodium 134 (L)   Potassium 4.6   Chloride 99   CO2 26   Anion Gap 9   BUN, Bld 14   Creatinine 0.9   eGFR if non African American >60.0   eGFR if African American >60.0   Glucose 265 (H)   Calcium 8.7   Phosphorus 3.4   Magnesium 1.7   Alkaline Phosphatase 89   Total Protein 6.9   Albumin 2.3 (L)   Total Bilirubin 0.4   AST 23   ALT 16     CBC:   6/12/2018 04:18   WBC 9.52   RBC 4.12 (L)   Hemoglobin 12.2 (L)   Hematocrit 36.1 (L)   MCV 88   MCH 29.6   MCHC  33.8   RDW 12.1   Platelets 375 (H)   MPV 8.9 (L)   Gran% 66.0   Gran # (ANC) 6.3   Immature Granulocytes 2.0 (H)   Immature Grans (Abs) 0.19 (H)   Lymph% 19.2   Lymph # 1.8   Mono% 9.7   Mono # 0.9   Eosinophil% 2.7   Eos # 0.3   Basophil% 0.4   Baso # 0.04   nRBC 0     DM panel:   6/11/2018 09:36 6/11/2018 13:18 6/11/2018 18:17 6/11/2018 21:45 6/12/2018 08:30 6/12/2018 13:16   POCT Glucose 218 (H) 137 (H) 123 (H) 171 (H) 212 (H) 222 (H)     Significant Imaging: I have reviewed and interpreted all pertinent imaging results/findings within the past 24 hours.   No new imaging.    Assessment/Plan:      * Diabetic ulcer of right midfoot    Cellulitis  R foot wound with foreign object (glass)  - S/p OR debridement by podiatry on day 2. Aerobic wound culture on 6/2/18 grew strep agalactiae. Anaerobic culture pending. No signs of osteomyelitis. Blood culture NGTD. WBC persistently 13. Repeat XR foot showed residual piece of glass in wound. Developed recurrent fevers with temp up to 102 F. Blood cultures from 6/2/18 showed no growth. Multiple unsuccessful attempts to retrieve remaining piece of glass in foot in OR on day 5. Continued to have fever afterwards, consistent with lack of source control. Blood cultures on 6/6/18 negative. Repeat I/D on day 7 had successful removal of large piece of residual glass. However, one tiny residual piece remained, confirmed by XR post op. Wound cultures from OR again grew group B strep. Fever and WBC resolved afterwards. Did not require any prn opiates for pain post op.  - Currently asymptomatic. Cellulitis of R leg improving.  - Continue cefazolin 2 g IV q8h for 6 week course, for osteomyelitis coverage, given proximity of foreign object to bone (started on day 3, end date 7/14/18). S/p aztreonam, flagyl, daptomycin initially. PICC line placed on 6/8/18 for prolonged abx course.  - Discharge home with wound vac, per podiatry.  - Follow up with podiatry and ID in clinic.        Type 2  diabetes mellitus 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, with long-term current use of insulin    - Uncontrolled outpatient with hgb A1C of 12, 's, and anion gap 14 on admission (previously 9.7 in 2/2018). On insulin glargine 55 units, aspart 20 units TIDWM, metformin, trulicity at home.  - BG mid-high 100s during the day, 200-220s overnight.  - Continue insulin detemir to 58 units daily.  - Continue insulin aspart 20 units with breakfast.  - Continue insulin aspart to 23 units with lunch and dinner.  - Continue SSI.  - POCT glucose AC & HS.  - Holding home metformin and trulicity while in house.        HTN (hypertension)    - On lisinopril 40 mg daily and carvedilol 12.5 mg BID at home.  - BP currently at goal.  - Continue home ACE-I.  - Continue home BB.  - Monitor.        CAD s/p PCI of LAD (SHELBY) in May 2013    - On ASA 81 mg daily and atorvastatin 80 mg daily at home.  - Home ASA held on admission in the setting of pending surgery.  - Remained stable and asymptomatic throughout course.  - Resume home ASA.  - Continue home statin.        Dyslipidemia    - On atorvastatin 80 mg daily at home.  - Continue home statin.          VTE Risk Mitigation         Ordered     enoxaparin injection 40 mg  Daily      06/05/18 1706     IP VTE HIGH RISK PATIENT  Once      06/02/18 1282        Millicent Torre MD  Department of Hospital Medicine   [facility]

## 2018-06-12 NOTE — PT/OT/SLP PROGRESS
Physical Therapy Treatment    Patient Name:  Billy Rivera   MRN:  [id number]    Recommendations:     Discharge Recommendations:  home with home health   Discharge Equipment Recommendations: cane, straight, walker, rolling   Barriers to discharge: None    Assessment:     Billy Rivera is a 53 y.o. male admitted with a medical diagnosis of Diabetic ulcer of right midfoot.  He presents with the following impairments/functional limitations:  gait instability, decreased lower extremity function, orthopedic precautions. Patient maintains good functional mobility/transfer skills and is able to ambulate w/ proper w/b precautions. Patient would greatly benefit from a darco boot, but is otherwise safe in ambulation.     Rehab Prognosis:  good; patient would benefit from acute skilled PT services to address these deficits and reach maximum level of function.      Recent Surgery: Procedure(s) (LRB):  INCISION AND DRAINAGE, FOOT (Right)  REMOVAL, FOREIGN BODY, FOOT (Right) 5 Days Post-Op    Plan:     During this hospitalization, patient to be seen 3 x/week to address the above listed problems via gait training, therapeutic exercises  · Plan of Care Expires:  07/08/18   Plan of Care Reviewed with: patient    Subjective     Communicated with nurse prior to session.  Patient found sitting in recliner upon PT entry to room, agreeable to treatment.      Chief Complaint: orthopedic precautions  Patient comments/goals: return to plof  Pain/Comfort:  · Pain Rating 1: 0/10    Patients cultural, spiritual, Mosque conflicts given the current situation: none stated     Objective:     Patient found with:  (all lines intact)     General Precautions: Standard, fall   Orthopedic Precautions:RLE partial weight bearing   Braces:  (Darco not present in room)     Functional Mobility:  · Transfers:  Sit to Stand:  supervision with no AD  · Gait: 88' SBA w/ no AD; w/ RLE PWB through heel  · Stairs:  Pt ascended/descended 1 flight(s)  with No Assistive Device with right handrail with Stand-by Assistance.   · PWB through R heel      AM-PAC 6 CLICK MOBILITY  Turning over in bed (including adjusting bedclothes, sheets and blankets)?: 4  Sitting down on and standing up from a chair with arms (e.g., wheelchair, bedside commode, etc.): 4  Moving from lying on back to sitting on the side of the bed?: 4  Moving to and from a bed to a chair (including a wheelchair)?: 4  Need to walk in hospital room?: 4  Climbing 3-5 steps with a railing?: 3  Total Score: 23       Therapeutic Activities and Exercises:   Patient assisted w/ hallway ambulation and stair navigation    Patient left up in chair with all lines intact, call button in reach and nurse notified..    GOALS:    Physical Therapy Goals        Problem: Physical Therapy Goal    Goal Priority Disciplines Outcome Goal Variances Interventions   Physical Therapy Goal     PT/OT, PT Ongoing (interventions implemented as appropriate)     Description:  Goals to be met by: 18    Patient will increase functional independence with mobility by performin. Sit to stand transfer with Supervision  2. Gait  x 100 feet with Supervision using LRAD and w/b precautions   3. Ascend/descend 2 stair with bilateral Handrails Supervision using LRAD                    Time Tracking:     PT Received On: 18  PT Start Time: 1330     PT Stop Time: 1338  PT Total Time (min): 8 min     Billable Minutes: Gait Training 8 Min    Treatment Type: Treatment  PT/PTA: PT           Felice Wilkins, PT  2018

## 2018-06-12 NOTE — ASSESSMENT & PLAN NOTE
S/p right foot I&D, surgical site with serous drainage.   Left second toe with dusky appearance improved. Right dorsal surgical site with ischemic appearance to suture site.   S/p angio per interventional cardiology, patient with 3 vessel runoff. Appreciate assistance.   Repeat xray right foot ordered, reviewed noting small piece <1mm  residual foreign body right foot.   Plan to monitor closely with wound VAC application   Abx per ID    Abx per ID appreciate assistance.   Podiatry will follow      Weightbearing Status: Partial heel WB right   Offloading Device: DARCO shoe     Tonya Valle DPM PGY-3  Pager: [phone]

## 2018-06-12 NOTE — SUBJECTIVE & OBJECTIVE
Interval History:   NAEON. No fever or pain. Having BM daily.    Review of Systems   Constitutional: Negative for chills, fever and unexpected weight change.   HENT: Negative for hearing loss.    Eyes: Negative for visual disturbance.   Respiratory: Negative for cough and shortness of breath.    Cardiovascular: Negative for chest pain, palpitations and leg swelling.   Gastrointestinal: Negative for abdominal distention, abdominal pain, blood in stool, constipation, diarrhea, nausea, rectal pain and vomiting.   Genitourinary: Negative for dysuria and hematuria.   Musculoskeletal: Negative for arthralgias.   Skin: Positive for color change, rash and wound.   Neurological: Negative for seizures, weakness and headaches.   Hematological: Negative for adenopathy. Does not bruise/bleed easily.     Objective:     Vital Signs (Most Recent):  Temp: 98.6 °F (37 °C) (06/12/18 1155)  Pulse: 61 (06/12/18 1155)  Resp: 17 (06/12/18 0713)  BP: 112/63 (06/12/18 1155)  SpO2: 98 % (06/12/18 1155) Vital Signs (24h Range):  Temp:  [97.2 °F (36.2 °C)-99.8 °F (37.7 °C)] 98.6 °F (37 °C)  Pulse:  [55-63] 61  Resp:  [17-18] 17  SpO2:  [95 %-98 %] 98 %  BP: (106-136)/(55-75) 112/63     Weight: 122.5 kg (270 lb)  Body mass index is 35.62 kg/m².    Intake/Output Summary (Last 24 hours) at 06/12/18 1425  Last data filed at 06/12/18 0638   Gross per 24 hour   Intake              530 ml   Output                0 ml   Net              530 ml      Physical Exam   Constitutional: He is oriented to person, place, and time. He appears well-developed and well-nourished. No distress.   HENT:   Head: Normocephalic and atraumatic.   Eyes: Conjunctivae, EOM and lids are normal.   Neck: No tracheal deviation present. No thyromegaly present.   Cardiovascular: Normal rate, regular rhythm, normal heart sounds and intact distal pulses.    No murmur heard.  Pulmonary/Chest: Effort normal and breath sounds normal. No respiratory distress. He has no wheezes. He has  no rales.   Abdominal: Soft. He exhibits no distension and no mass. There is no tenderness. No hernia.   Musculoskeletal: He exhibits no edema or tenderness.   Right foot bandage, intact, clean, dry. R second toe discoloration improving.   Neurological: He is alert and oriented to person, place, and time. No cranial nerve deficit. He exhibits normal muscle tone.   Skin: Skin is warm and dry. Rash noted. He is not diaphoretic. There is erythema.   RLE erythema up to ankle, reduced in area from marked border on admission. Non tender, non edematous.   Psychiatric: He has a normal mood and affect.   Vitals reviewed.    Significant Labs: All pertinent labs within the past 24 hours have been reviewed.  CMP:   6/12/2018 04:18   Sodium 134 (L)   Potassium 4.6   Chloride 99   CO2 26   Anion Gap 9   BUN, Bld 14   Creatinine 0.9   eGFR if non African American >60.0   eGFR if African American >60.0   Glucose 265 (H)   Calcium 8.7   Phosphorus 3.4   Magnesium 1.7   Alkaline Phosphatase 89   Total Protein 6.9   Albumin 2.3 (L)   Total Bilirubin 0.4   AST 23   ALT 16     CBC:   6/12/2018 04:18   WBC 9.52   RBC 4.12 (L)   Hemoglobin 12.2 (L)   Hematocrit 36.1 (L)   MCV 88   MCH 29.6   MCHC 33.8   RDW 12.1   Platelets 375 (H)   MPV 8.9 (L)   Gran% 66.0   Gran # (ANC) 6.3   Immature Granulocytes 2.0 (H)   Immature Grans (Abs) 0.19 (H)   Lymph% 19.2   Lymph # 1.8   Mono% 9.7   Mono # 0.9   Eosinophil% 2.7   Eos # 0.3   Basophil% 0.4   Baso # 0.04   nRBC 0     DM panel:   6/11/2018 09:36 6/11/2018 13:18 6/11/2018 18:17 6/11/2018 21:45 6/12/2018 08:30 6/12/2018 13:16   POCT Glucose 218 (H) 137 (H) 123 (H) 171 (H) 212 (H) 222 (H)     Significant Imaging: I have reviewed and interpreted all pertinent imaging results/findings within the past 24 hours.   No new imaging.

## 2018-06-12 NOTE — PROGRESS NOTES
[facility]  Podiatry  Progress Note    Patient Name: Billy Sheffield Miguel  MRN: [id number]  Admission Date: 6/1/2018  Hospital Length of Stay: 10 days  Attending Physician: Josiah Sawyer, *  Primary Care Provider: BRAD Baker MD   Interval Hx: S/p  right foot I&D DOS:6/2/18, 6/5/18 and 6/7/18 . Reports pain improved well controlled with pain meds. Removed large piece of glass from wound however  xrays reviewed noting residual foreign body in foot. Pending application of Home wound VAC. Pt. Reports he is unable to afford [employer] wound VAC as they will charge him $60 a day for a minimum of 45 days.     Scheduled Meds:   aspirin  81 mg Oral Daily    atorvastatin  80 mg Oral Daily    carvedilol  12.5 mg Oral BID    ceFAZolin (ANCEF) IVPB  2 g Intravenous Q8H    enoxaparin  40 mg Subcutaneous Daily    insulin aspart U-100  20 Units Subcutaneous with breakfast    insulin aspart U-100  23 Units Subcutaneous with lunch    insulin aspart U-100  23 Units Subcutaneous with dinner    insulin detemir U-100  58 Units Subcutaneous QHS    Lactobacillus rhamnosus GG  1 capsule Oral Daily    lisinopril  40 mg Oral Daily    magnesium oxide  400 mg Oral BID    psyllium husk (aspartame)  1 packet Oral Daily    senna-docusate 8.6-50 mg  1 tablet Oral Daily     Continuous Infusions:  PRN Meds:acetaminophen, dextrose 50%, dextrose 50%, glucagon (human recombinant), glucose, glucose, insulin aspart U-100, ondansetron, oxyCODONE, sodium chloride 0.9%, sodium chloride 0.9%, sodium chloride 0.9%    Review of patient's allergies indicates:   Allergen Reactions    Penicillins Anaphylaxis    Shellfish containing products      Other reaction(s): Unknown    Vancomycin Itching    Bactrim  [sulfamethoxazole-trimethoprim] Rash        Past Medical History:   Diagnosis Date    Allergy     CAD (coronary artery disease), native coronary artery 6/25/2013    Cancer     Diabetes mellitus     Diabetes mellitus,  type 2     Disorder of kidney and ureter     Heart attack 04/2012    Hyperlipidemia     Hypertension     Muscular pain     post-op after colonoscopy     Past Surgical History:   Procedure Laterality Date    COLONOSCOPY N/A 2/17/2017    Procedure: COLONOSCOPY;  Surgeon: Simon Gomez MD;  Location: [facility] (4TH FLR);  Service: Endoscopy;  Laterality: N/A;    CORONARY ANGIOPLASTY      INCISION AND DRAINAGE FOOT Right 6/5/2018    Procedure: INCISION AND DRAINAGE, FOOT;  Surgeon: Bridget Santana DPM;  Location: [facility] OR 1ST FLR;  Service: Podiatry;  Laterality: Right;  Request mini C arm in room. request wound vac with medium black sponge    INCISION AND DRAINAGE FOOT Right 6/7/2018    Procedure: INCISION AND DRAINAGE, FOOT;  Surgeon: Emelia Brock DPM;  Location: [facility] OR 2ND FLR;  Service: Podiatry;  Laterality: Right;    INCISION AND DRAINAGE OF ABSCESS Right 6/2/2018    Procedure: INCISION AND DRAINAGE, ABSCESS;  Surgeon: Bridget Santana DPM;  Location: [facility] OR 2ND FLR;  Service: General;  Laterality: Right;    REMOVAL OF FOREIGN BODY FROM FOOT Right 6/7/2018    Procedure: REMOVAL, FOREIGN BODY, FOOT;  Surgeon: Emelia Brock DPM;  Location: [facility] OR 2ND FLR;  Service: Podiatry;  Laterality: Right;    TONSILLECTOMY         Family History     Problem Relation (Age of Onset)    Heart disease Mother, Brother        Social History Main Topics    Smoking status: Never Smoker    Smokeless tobacco: Never Used    Alcohol use Yes      Comment: rare x1 beer-     Drug use: No    Sexual activity: Not Currently     Review of Systems   Constitutional: Positive for activity change.   Respiratory: Negative for shortness of breath.    Cardiovascular: Negative for chest pain and leg swelling.   Gastrointestinal: Negative for nausea and vomiting.   Genitourinary: Negative.    Musculoskeletal: Positive for arthralgias.   Skin: Positive for wound.   Neurological: Negative for weakness and numbness.    Psychiatric/Behavioral: Negative.      Objective:     Vital Signs (Most Recent):  Temp: 98.2 °F (36.8 °C) (06/11/18 1529)  Pulse: 60 (06/11/18 1529)  Resp: 18 (06/11/18 1529)  BP: 136/70 (06/11/18 1529)  SpO2: 98 % (06/11/18 1529) Vital Signs (24h Range):  Temp:  [98 °F (36.7 °C)-98.6 °F (37 °C)] 98.2 °F (36.8 °C)  Pulse:  [56-67] 60  Resp:  [16-18] 18  SpO2:  [94 %-98 %] 98 %  BP: (115-136)/(57-77) 136/70     Weight: 122.5 kg (270 lb)  Body mass index is 35.62 kg/m².    Foot Exam    General  Orientation: alert and oriented to person, place, and time       Right Foot/Ankle     Inspection and Palpation  Tenderness: metatarsals   Swelling: metatarsals   Skin Exam: ulcer;     Neurovascular  Dorsalis pedis: 1+  Posterior tibial: 1+      Left Foot/Ankle      Inspection and Palpation  Tenderness: none   Swelling: none   Skin Exam: erythema;     Neurovascular  Dorsalis pedis: 1+  Posterior tibial: 1+          6/11/18    Wound 1:Plantar right foot   Measurement: 5cmx0.2cmx0.5cm  Base: fibrous   Periwound skin: macerated tissue -   Drainage: serous  Erythema: continued erythema noted to right foot.   Probe: negatetive probe to bone.     Left second toe with dusky appearance- improved.     Wound 1: Right dorsal foot.   Measurement:2 incisions one at base of 2nd-- 0.5cmx0.5cmx0.5cm and 3rd toe. One at plantar foot-1eim6sjp8.5cm , 2nd interdigital space 1.0cmx0.5cmx0.1cm.    Base: fibrous base   Periwound skin: erythema improved.   Drainage: serous,   Erythema: moderate - improved.   Probe: none, no bone exposed    Right dorsal suture site with ischemic appearance.                               Gentian violet applied to right plantar foot.                           Laboratory:  CBC:     Recent Labs  Lab 06/11/18  0540   WBC 10.20   RBC 4.21*   HGB 12.5*   HCT 36.8*   *   MCV 87   MCH 29.7   MCHC 34.0     CMP:     Recent Labs  Lab 06/11/18  0540   *   CALCIUM 8.8   ALBUMIN 2.3*   PROT 7.0      K 4.4   CO2  26      BUN 12   CREATININE 0.9   ALKPHOS 88   ALT 21   AST 26   BILITOT 0.4       Diagnostic Results:  Xray: There is DJD and spurs on the calcaneus.  There is a foreign body soft tissues below in between the 1st and 2nd metatarsals thought to be a shard of glass.  There may be soft tissue swelling.  No fracture dislocation bone destruction seen.    MRI: No MR evidence of osteomyelitis.    Diffuse subcutaneous and myofascial forefoot edema with multiple plantar skin defects, overall, concerning for cellulitis/myositis.  Associated radiopaque foreign body likely reflecting glass is more conspicuous on comparison foot radiograph.    Nonspecific dermal fluid collection along the plantar aspect of the 1st and 2nd metatarsophalangeal joints, possibly a blister.  An infected fluid collection cannot be excluded.  Suggest correlation with direct visualization and clinical findings.    Clinical Findings:  S/p  right foot I&D. Surgical site stable with serous drainage. Pain upon palpation right foot.     Assessment/Plan:     * Diabetic ulcer of right midfoot    S/p right foot I&D, surgical site with serous drainage.   Left second toe with dusky appearance improved. Right dorsal surgical site with ischemic appearance to suture site.   S/p angio per interventional cardiology, patient with 3 vessel runoff. Appreciate assistance.   Repeat xray right foot ordered, reviewed noting small piece <1mm  residual foreign body right foot.   Will discuss with SW the possibility of application of [employer] wound VAC as patient assistance is available with this device.   Plan to monitor closely with wound VAC application   Abx per ID    Abx per ID appreciate assistance.   Podiatry will follow      Weightbearing Status: Partial heel WB right   Offloading Device: DARCO shoe     Tonya Valle DPM PGY-3  Pager: [phone]              Foreign body in right foot    S/p right foot I&D x3. Large piece of glass removed from foot. Residual piece of  glass noted negligible in size <1mm.         Right foot infection    Per above         Type 2 diabetes mellitus 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, with long-term current use of insulin    Per primary             Tonya Valle MD  Podiatry  [facility] [address]

## 2018-06-12 NOTE — PLAN OF CARE
Problem: Physical Therapy Goal  Goal: Physical Therapy Goal  Goals to be met by: 18    Patient will increase functional independence with mobility by performin. Sit to stand transfer with Supervision  2. Gait  x 100 feet with Supervision using LRAD and w/b precautions   3. Ascend/descend 2 stair with bilateral Handrails Supervision using LRAD   Outcome: Ongoing (interventions implemented as appropriate)  PT Goals remain appropriate; continue POC as established  Patient needs Ana Wilkins PT, DPT  2018  Pager [phone]

## 2018-06-12 NOTE — ASSESSMENT & PLAN NOTE
- Uncontrolled outpatient with hgb A1C of 12, 's, and anion gap 14 on admission (previously 9.7 in 2/2018). On insulin glargine 55 units, aspart 20 units TIDWM, metformin, trulicity at home.  - BG mid-high 100s during the day, 200-220s overnight.  - Continue insulin detemir to 58 units daily.  - Continue insulin aspart 20 units with breakfast.  - Continue insulin aspart to 23 units with lunch and dinner.  - Continue SSI.  - POCT glucose AC & HS.  - Holding home metformin and trulicity while in house.

## 2018-06-12 NOTE — PLAN OF CARE
Problem: Patient Care Overview  Goal: Plan of Care Review  Outcome: Ongoing (interventions implemented as appropriate)  Plan of care reviewed. Pt visibly upset and crying dt insurance verification and coverage of items needed to heal Right foot. SW informed pt of plan. Pt hoping to be dc'd to rehab tomorrow. Pt sitting in chair with no distress noted.

## 2018-06-12 NOTE — PLAN OF CARE
"Dipika informed by Gris with [facility] that Pt has a 40 percent copay of 63 per day for 45 days and Pt stated "he cannot afford this copay." Sw to check with [facility] liaison Ami to see if Pt's abx are covered. Pt staffed with Juan Mueller with [facility], still no word on insurance benefits, Sw to be informed shortly Ami and Sw to follow. Pt asking if wound vac is necessary, Sw to follow with teams for above information. Ami with [facility] states "Pt has a 651 dollar copayment per week and need 5 to 6 weeks of IV abx. Pt verbalized understanding, agreeable to LTAC for placement for wound care, IV abx and wound vac. Sw to follow. [facility] with [facility] with [facility].   "

## 2018-06-12 NOTE — SUBJECTIVE & OBJECTIVE
Interval Hx: S/p  right foot I&D DOS:6/2/18, 6/5/18 and 6/7/18 . Reports pain improved well controlled with pain meds. Removed large piece of glass from wound however  xrays reviewed noting residual foreign body in foot. Pending application of Home wound VAC.     Scheduled Meds:   aspirin  81 mg Oral Daily    atorvastatin  80 mg Oral Daily    carvedilol  12.5 mg Oral BID    ceFAZolin (ANCEF) IVPB  2 g Intravenous Q8H    enoxaparin  40 mg Subcutaneous Daily    insulin aspart U-100  20 Units Subcutaneous with breakfast    insulin aspart U-100  23 Units Subcutaneous with lunch    insulin aspart U-100  23 Units Subcutaneous with dinner    insulin detemir U-100  58 Units Subcutaneous QHS    Lactobacillus rhamnosus GG  1 capsule Oral Daily    lisinopril  40 mg Oral Daily    magnesium oxide  400 mg Oral BID    psyllium husk (aspartame)  1 packet Oral Daily    senna-docusate 8.6-50 mg  1 tablet Oral Daily     Continuous Infusions:  PRN Meds:acetaminophen, dextrose 50%, dextrose 50%, glucagon (human recombinant), glucose, glucose, insulin aspart U-100, ondansetron, oxyCODONE, sodium chloride 0.9%, sodium chloride 0.9%, sodium chloride 0.9%    Review of patient's allergies indicates:   Allergen Reactions    Penicillins Anaphylaxis    Shellfish containing products      Other reaction(s): Unknown    Vancomycin Itching    Bactrim  [sulfamethoxazole-trimethoprim] Rash        Past Medical History:   Diagnosis Date    Allergy     CAD (coronary artery disease), native coronary artery 6/25/2013    Cancer     Diabetes mellitus     Diabetes mellitus, type 2     Disorder of kidney and ureter     Heart attack 04/2012    Hyperlipidemia     Hypertension     Muscular pain     post-op after colonoscopy     Past Surgical History:   Procedure Laterality Date    COLONOSCOPY N/A 2/17/2017    Procedure: COLONOSCOPY;  Surgeon: Simon Gomez MD;  Location: [address]);  Service: Endoscopy;  Laterality: N/A;     CORONARY ANGIOPLASTY      INCISION AND DRAINAGE FOOT Right 6/5/2018    Procedure: INCISION AND DRAINAGE, FOOT;  Surgeon: Bridget Santana DPM;  Location: [facility] OR 1ST FLR;  Service: Podiatry;  Laterality: Right;  Request mini C arm in room. request wound vac with medium black sponge    INCISION AND DRAINAGE FOOT Right 6/7/2018    Procedure: INCISION AND DRAINAGE, FOOT;  Surgeon: Emelia Brock DPM;  Location: [facility] OR 2ND FLR;  Service: Podiatry;  Laterality: Right;    INCISION AND DRAINAGE OF ABSCESS Right 6/2/2018    Procedure: INCISION AND DRAINAGE, ABSCESS;  Surgeon: Bridget Santana DPM;  Location: [facility] OR 2ND FLR;  Service: General;  Laterality: Right;    REMOVAL OF FOREIGN BODY FROM FOOT Right 6/7/2018    Procedure: REMOVAL, FOREIGN BODY, FOOT;  Surgeon: Emelia Brock DPM;  Location: [facility] OR 2ND FLR;  Service: Podiatry;  Laterality: Right;    TONSILLECTOMY         Family History     Problem Relation (Age of Onset)    Heart disease Mother, Brother        Social History Main Topics    Smoking status: Never Smoker    Smokeless tobacco: Never Used    Alcohol use Yes      Comment: rare x1 beer-     Drug use: No    Sexual activity: Not Currently     Review of Systems   Constitutional: Positive for activity change.   Respiratory: Negative for shortness of breath.    Cardiovascular: Negative for chest pain and leg swelling.   Gastrointestinal: Negative for nausea and vomiting.   Genitourinary: Negative.    Musculoskeletal: Positive for arthralgias.   Skin: Positive for wound.   Neurological: Negative for weakness and numbness.   Psychiatric/Behavioral: Negative.      Objective:     Vital Signs (Most Recent):  Temp: 98.2 °F (36.8 °C) (06/11/18 1529)  Pulse: 60 (06/11/18 1529)  Resp: 18 (06/11/18 1529)  BP: 136/70 (06/11/18 1529)  SpO2: 98 % (06/11/18 1529) Vital Signs (24h Range):  Temp:  [98 °F (36.7 °C)-98.6 °F (37 °C)] 98.2 °F (36.8 °C)  Pulse:  [56-67] 60  Resp:  [16-18] 18  SpO2:  [94 %-98 %] 98 %  BP:  (115-136)/(57-77) 136/70     Weight: 122.5 kg (270 lb)  Body mass index is 35.62 kg/m².    Foot Exam    General  Orientation: alert and oriented to person, place, and time       Right Foot/Ankle     Inspection and Palpation  Tenderness: metatarsals   Swelling: metatarsals   Skin Exam: ulcer;     Neurovascular  Dorsalis pedis: 1+  Posterior tibial: 1+      Left Foot/Ankle      Inspection and Palpation  Tenderness: none   Swelling: none   Skin Exam: erythema;     Neurovascular  Dorsalis pedis: 1+  Posterior tibial: 1+          6/11/18    Wound 1:Plantar right foot   Measurement: 5cmx0.2cmx0.5cm  Base: fibrous   Periwound skin: macerated tissue -   Drainage: serous  Erythema: continued erythema noted to right foot.   Probe: negatetive probe to bone.     Left second toe with dusky appearance- improved.     Wound 1: Right dorsal foot.   Measurement:2 incisions one at base of 2nd-- 0.5cmx0.5cmx0.5cm and 3rd toe. One at plantar foot-4pcl9nag5.5cm , 2nd interdigital space 1.0cmx0.5cmx0.1cm.    Base: fibrous base   Periwound skin: erythema improved.   Drainage: serous,   Erythema: moderate - improved.   Probe: none, no bone exposed    Right dorsal suture site with ischemic appearance.                               Gentian violet applied to right plantar foot.                           Laboratory:  CBC:     Recent Labs  Lab 06/11/18  0540   WBC 10.20   RBC 4.21*   HGB 12.5*   HCT 36.8*   *   MCV 87   MCH 29.7   MCHC 34.0     CMP:     Recent Labs  Lab 06/11/18  0540   *   CALCIUM 8.8   ALBUMIN 2.3*   PROT 7.0      K 4.4   CO2 26      BUN 12   CREATININE 0.9   ALKPHOS 88   ALT 21   AST 26   BILITOT 0.4       Diagnostic Results:  Xray: There is DJD and spurs on the calcaneus.  There is a foreign body soft tissues below in between the 1st and 2nd metatarsals thought to be a shard of glass.  There may be soft tissue swelling.  No fracture dislocation bone destruction seen.    MRI: No MR evidence of  osteomyelitis.    Diffuse subcutaneous and myofascial forefoot edema with multiple plantar skin defects, overall, concerning for cellulitis/myositis.  Associated radiopaque foreign body likely reflecting glass is more conspicuous on comparison foot radiograph.    Nonspecific dermal fluid collection along the plantar aspect of the 1st and 2nd metatarsophalangeal joints, possibly a blister.  An infected fluid collection cannot be excluded.  Suggest correlation with direct visualization and clinical findings.    Clinical Findings:  S/p  right foot I&D. Surgical site stable with serous drainage. Pain upon palpation right foot.

## 2018-06-12 NOTE — SUBJECTIVE & OBJECTIVE
Interval Hx: S/p  right foot I&D DOS:6/2/18, 6/5/18 and 6/7/18 . Reports pain improved well controlled with pain meds. Removed large piece of glass from wound however  xrays reviewed noting residual foreign body in foot. Pending application of Home wound VAC.     Scheduled Meds:   aspirin  81 mg Oral Daily    atorvastatin  80 mg Oral Daily    carvedilol  12.5 mg Oral BID    ceFAZolin (ANCEF) IVPB  2 g Intravenous Q8H    enoxaparin  40 mg Subcutaneous Daily    insulin aspart U-100  20 Units Subcutaneous with breakfast    insulin aspart U-100  23 Units Subcutaneous with lunch    insulin aspart U-100  23 Units Subcutaneous with dinner    insulin detemir U-100  58 Units Subcutaneous QHS    Lactobacillus rhamnosus GG  1 capsule Oral Daily    lisinopril  40 mg Oral Daily    magnesium oxide  400 mg Oral BID    psyllium husk (aspartame)  1 packet Oral Daily    senna-docusate 8.6-50 mg  1 tablet Oral Daily     Continuous Infusions:  PRN Meds:acetaminophen, dextrose 50%, dextrose 50%, glucagon (human recombinant), glucose, glucose, insulin aspart U-100, ondansetron, oxyCODONE, sodium chloride 0.9%, sodium chloride 0.9%, sodium chloride 0.9%    Review of patient's allergies indicates:   Allergen Reactions    Penicillins Anaphylaxis    Shellfish containing products      Other reaction(s): Unknown    Vancomycin Itching    Bactrim  [sulfamethoxazole-trimethoprim] Rash        Past Medical History:   Diagnosis Date    Allergy     CAD (coronary artery disease), native coronary artery 6/25/2013    Cancer     Diabetes mellitus     Diabetes mellitus, type 2     Disorder of kidney and ureter     Heart attack 04/2012    Hyperlipidemia     Hypertension     Muscular pain     post-op after colonoscopy     Past Surgical History:   Procedure Laterality Date    COLONOSCOPY N/A 2/17/2017    Procedure: COLONOSCOPY;  Surgeon: Simon Gomez MD;  Location: [address]);  Service: Endoscopy;  Laterality: N/A;     CORONARY ANGIOPLASTY      INCISION AND DRAINAGE FOOT Right 6/5/2018    Procedure: INCISION AND DRAINAGE, FOOT;  Surgeon: Bridget Santana DPM;  Location: [facility] OR 1ST FLR;  Service: Podiatry;  Laterality: Right;  Request mini C arm in room. request wound vac with medium black sponge    INCISION AND DRAINAGE FOOT Right 6/7/2018    Procedure: INCISION AND DRAINAGE, FOOT;  Surgeon: Emelia Brock DPM;  Location: [facility] OR 2ND FLR;  Service: Podiatry;  Laterality: Right;    INCISION AND DRAINAGE OF ABSCESS Right 6/2/2018    Procedure: INCISION AND DRAINAGE, ABSCESS;  Surgeon: Bridget Santana DPM;  Location: [facility] OR 2ND FLR;  Service: General;  Laterality: Right;    REMOVAL OF FOREIGN BODY FROM FOOT Right 6/7/2018    Procedure: REMOVAL, FOREIGN BODY, FOOT;  Surgeon: Emelia Brock DPM;  Location: [facility] OR 2ND FLR;  Service: Podiatry;  Laterality: Right;    TONSILLECTOMY         Family History     Problem Relation (Age of Onset)    Heart disease Mother, Brother        Social History Main Topics    Smoking status: Never Smoker    Smokeless tobacco: Never Used    Alcohol use Yes      Comment: rare x1 beer-     Drug use: No    Sexual activity: Not Currently     Review of Systems   Constitutional: Positive for activity change.   Respiratory: Negative for shortness of breath.    Cardiovascular: Negative for chest pain and leg swelling.   Gastrointestinal: Negative for nausea and vomiting.   Genitourinary: Negative.    Musculoskeletal: Positive for arthralgias.   Skin: Positive for wound.   Neurological: Negative for weakness and numbness.   Psychiatric/Behavioral: Negative.      Objective:     Vital Signs (Most Recent):  Temp: 98.6 °F (37 °C) (06/12/18 1155)  Pulse: 61 (06/12/18 1155)  Resp: 17 (06/12/18 0713)  BP: 112/63 (06/12/18 1155)  SpO2: 98 % (06/12/18 1155) Vital Signs (24h Range):  Temp:  [97.2 °F (36.2 °C)-99.8 °F (37.7 °C)] 98.6 °F (37 °C)  Pulse:  [55-63] 61  Resp:  [17-18] 17  SpO2:  [95 %-98 %] 98 %  BP:  (106-136)/(55-75) 112/63     Weight: 122.5 kg (270 lb)  Body mass index is 35.62 kg/m².    Foot Exam    General  Orientation: alert and oriented to person, place, and time       Right Foot/Ankle     Inspection and Palpation  Tenderness: metatarsals   Swelling: metatarsals   Skin Exam: ulcer;     Neurovascular  Dorsalis pedis: 1+  Posterior tibial: 1+      Left Foot/Ankle      Inspection and Palpation  Tenderness: none   Swelling: none   Skin Exam: erythema;     Neurovascular  Dorsalis pedis: 1+  Posterior tibial: 1+          6/11/18    Wound 1:Plantar right foot   Measurement: 5cmx0.2cmx0.5cm  Base: fibrous   Periwound skin: macerated tissue -   Drainage: serous  Erythema: continued erythema noted to right foot.   Probe: negatetive probe to bone.     Left second toe with dusky appearance- improved.     Wound 1: Right dorsal foot.   Measurement:2 incisions one at base of 2nd-- 0.5cmx0.5cmx0.5cm and 3rd toe. One at plantar foot-6gcq9htw5.5cm , 2nd interdigital space 1.0cmx0.5cmx0.1cm.    Base: fibrous base   Periwound skin: erythema improved.   Drainage: serous,   Erythema: moderate - improved.   Probe: none, no bone exposed    Right dorsal suture site with ischemic appearance.                               Gentian violet applied to right plantar foot.                           Laboratory:  CBC:     Recent Labs  Lab 06/12/18  0418   WBC 9.52   RBC 4.12*   HGB 12.2*   HCT 36.1*   *   MCV 88   MCH 29.6   MCHC 33.8     CMP:     Recent Labs  Lab 06/12/18 0418   *   CALCIUM 8.7   ALBUMIN 2.3*   PROT 6.9   *   K 4.6   CO2 26   CL 99   BUN 14   CREATININE 0.9   ALKPHOS 89   ALT 16   AST 23   BILITOT 0.4       Diagnostic Results:  Xray: There is DJD and spurs on the calcaneus.  There is a foreign body soft tissues below in between the 1st and 2nd metatarsals thought to be a shard of glass.  There may be soft tissue swelling.  No fracture dislocation bone destruction seen.    MRI: No MR evidence of  osteomyelitis.    Diffuse subcutaneous and myofascial forefoot edema with multiple plantar skin defects, overall, concerning for cellulitis/myositis.  Associated radiopaque foreign body likely reflecting glass is more conspicuous on comparison foot radiograph.    Nonspecific dermal fluid collection along the plantar aspect of the 1st and 2nd metatarsophalangeal joints, possibly a blister.  An infected fluid collection cannot be excluded.  Suggest correlation with direct visualization and clinical findings.    Clinical Findings:  S/p  right foot I&D. Surgical site stable with serous drainage. Mild pain upon palpation right foot.

## 2018-06-12 NOTE — PROGRESS NOTES
[facility]  Podiatry  Progress Note    Patient Name: Billy Sheffield Miguel  MRN: [id number]  Admission Date: 6/1/2018  Hospital Length of Stay: 11 days  Attending Physician: Shree Arita MD  Primary Care Provider: BRAD Baker MD   Interval Hx: S/p  right foot I&D DOS:6/2/18, 6/5/18 and 6/7/18 . Reports pain improved well controlled with pain meds. Removed large piece of glass from wound however  xrays reviewed noting residual foreign body in foot. Pending application of Home wound VAC.     Scheduled Meds:   aspirin  81 mg Oral Daily    atorvastatin  80 mg Oral Daily    carvedilol  12.5 mg Oral BID    ceFAZolin (ANCEF) IVPB  2 g Intravenous Q8H    enoxaparin  40 mg Subcutaneous Daily    insulin aspart U-100  20 Units Subcutaneous with breakfast    insulin aspart U-100  23 Units Subcutaneous with lunch    insulin aspart U-100  23 Units Subcutaneous with dinner    insulin detemir U-100  58 Units Subcutaneous QHS    Lactobacillus rhamnosus GG  1 capsule Oral Daily    lisinopril  40 mg Oral Daily    magnesium oxide  400 mg Oral BID    psyllium husk (aspartame)  1 packet Oral Daily    senna-docusate 8.6-50 mg  1 tablet Oral Daily     Continuous Infusions:  PRN Meds:acetaminophen, dextrose 50%, dextrose 50%, glucagon (human recombinant), glucose, glucose, insulin aspart U-100, ondansetron, oxyCODONE, sodium chloride 0.9%, sodium chloride 0.9%, sodium chloride 0.9%    Review of patient's allergies indicates:   Allergen Reactions    Penicillins Anaphylaxis    Shellfish containing products      Other reaction(s): Unknown    Vancomycin Itching    Bactrim  [sulfamethoxazole-trimethoprim] Rash        Past Medical History:   Diagnosis Date    Allergy     CAD (coronary artery disease), native coronary artery 6/25/2013    Cancer     Diabetes mellitus     Diabetes mellitus, type 2     Disorder of kidney and ureter     Heart attack 04/2012    Hyperlipidemia     Hypertension      Muscular pain     post-op after colonoscopy     Past Surgical History:   Procedure Laterality Date    COLONOSCOPY N/A 2/17/2017    Procedure: COLONOSCOPY;  Surgeon: Simon Gomez MD;  Location: [facility] ENDO (4TH FLR);  Service: Endoscopy;  Laterality: N/A;    CORONARY ANGIOPLASTY      INCISION AND DRAINAGE FOOT Right 6/5/2018    Procedure: INCISION AND DRAINAGE, FOOT;  Surgeon: Bridget Santana DPM;  Location: [facility] OR 1ST FLR;  Service: Podiatry;  Laterality: Right;  Request mini C arm in room. request wound vac with medium black sponge    INCISION AND DRAINAGE FOOT Right 6/7/2018    Procedure: INCISION AND DRAINAGE, FOOT;  Surgeon: Emelia Brock DPM;  Location: [facility] OR 2ND FLR;  Service: Podiatry;  Laterality: Right;    INCISION AND DRAINAGE OF ABSCESS Right 6/2/2018    Procedure: INCISION AND DRAINAGE, ABSCESS;  Surgeon: Bridget Santana DPM;  Location: [facility] OR 2ND FLR;  Service: General;  Laterality: Right;    REMOVAL OF FOREIGN BODY FROM FOOT Right 6/7/2018    Procedure: REMOVAL, FOREIGN BODY, FOOT;  Surgeon: Emelia Brock DPM;  Location: [facility] OR 2ND FLR;  Service: Podiatry;  Laterality: Right;    TONSILLECTOMY         Family History     Problem Relation (Age of Onset)    Heart disease Mother, Brother        Social History Main Topics    Smoking status: Never Smoker    Smokeless tobacco: Never Used    Alcohol use Yes      Comment: rare x1 beer-     Drug use: No    Sexual activity: Not Currently     Review of Systems   Constitutional: Positive for activity change.   Respiratory: Negative for shortness of breath.    Cardiovascular: Negative for chest pain and leg swelling.   Gastrointestinal: Negative for nausea and vomiting.   Genitourinary: Negative.    Musculoskeletal: Positive for arthralgias.   Skin: Positive for wound.   Neurological: Negative for weakness and numbness.   Psychiatric/Behavioral: Negative.      Objective:     Vital Signs (Most Recent):  Temp: 98.6 °F (37 °C) (06/12/18  1155)  Pulse: 61 (06/12/18 1155)  Resp: 17 (06/12/18 0713)  BP: 112/63 (06/12/18 1155)  SpO2: 98 % (06/12/18 1155) Vital Signs (24h Range):  Temp:  [97.2 °F (36.2 °C)-99.8 °F (37.7 °C)] 98.6 °F (37 °C)  Pulse:  [55-63] 61  Resp:  [17-18] 17  SpO2:  [95 %-98 %] 98 %  BP: (106-136)/(55-75) 112/63     Weight: 122.5 kg (270 lb)  Body mass index is 35.62 kg/m².    Foot Exam    General  Orientation: alert and oriented to person, place, and time       Right Foot/Ankle     Inspection and Palpation  Tenderness: metatarsals   Swelling: metatarsals   Skin Exam: ulcer;     Neurovascular  Dorsalis pedis: 1+  Posterior tibial: 1+      Left Foot/Ankle      Inspection and Palpation  Tenderness: none   Swelling: none   Skin Exam: erythema;     Neurovascular  Dorsalis pedis: 1+  Posterior tibial: 1+          6/11/18    Wound 1:Plantar right foot   Measurement: 5cmx0.2cmx0.5cm  Base: fibrous   Periwound skin: macerated tissue -   Drainage: serous  Erythema: continued erythema noted to right foot.   Probe: negatetive probe to bone.     Left second toe with dusky appearance- improved.     Wound 1: Right dorsal foot.   Measurement:2 incisions one at base of 2nd-- 0.5cmx0.5cmx0.5cm and 3rd toe. One at plantar foot-[id number] , 2nd interdigital space 1.0cmx0.5cmx0.1cm.    Base: fibrous base   Periwound skin: erythema improved.   Drainage: serous,   Erythema: moderate - improved.   Probe: none, no bone exposed    Right dorsal suture site with ischemic appearance.                               Gentian violet applied to right plantar foot.                           Laboratory:  CBC:     Recent Labs  Lab 06/12/18 0418   WBC 9.52   RBC 4.12*   HGB 12.2*   HCT 36.1*   *   MCV 88   MCH 29.6   MCHC 33.8     CMP:     Recent Labs  Lab 06/12/18 0418   *   CALCIUM 8.7   ALBUMIN 2.3*   PROT 6.9   *   K 4.6   CO2 26   CL 99   BUN 14   CREATININE 0.9   ALKPHOS 89   ALT 16   AST 23   BILITOT 0.4       Diagnostic Results:  Xray:  There is DJD and spurs on the calcaneus.  There is a foreign body soft tissues below in between the 1st and 2nd metatarsals thought to be a shard of glass.  There may be soft tissue swelling.  No fracture dislocation bone destruction seen.    MRI: No MR evidence of osteomyelitis.    Diffuse subcutaneous and myofascial forefoot edema with multiple plantar skin defects, overall, concerning for cellulitis/myositis.  Associated radiopaque foreign body likely reflecting glass is more conspicuous on comparison foot radiograph.    Nonspecific dermal fluid collection along the plantar aspect of the 1st and 2nd metatarsophalangeal joints, possibly a blister.  An infected fluid collection cannot be excluded.  Suggest correlation with direct visualization and clinical findings.    Clinical Findings:  S/p  right foot I&D. Surgical site stable with serous drainage. Mild pain upon palpation right foot.     Assessment/Plan:          * Diabetic ulcer of right midfoot     S/p right foot I&D, surgical site with serous drainage.   Left second toe with dusky appearance improved. Right dorsal surgical site with ischemic appearance to suture site.   S/p angio per interventional cardiology, patient with 3 vessel runoff. Appreciate assistance.   Repeat xray right foot ordered, reviewed noting small piece <1mm  residual foreign body right foot.   Highly recommend wound vac and IV ABX at LTAC, will follow with SW  Abx per ID  Periwound macerated areas painted with gentian violet and wound packed with plain packing strips, dressed with abd pad, kerlix, loose ACE  Podiatry will follow      Weightbearing Status: Partial heel WB right   Offloading Device: DARCO shoe            Foreign body in right foot     S/p right foot I&D x3. Large piece of glass removed from foot. Residual piece of glass noted negligible in size <1mm.           Right foot infection     Per above           Type 2 diabetes mellitus with both eyes affected by moderate  nonproliferative retinopathy without macular edema, with long-term current use of insulin     Per primary             Lyudmila Yu MD  Podiatry  [facility]

## 2018-06-13 LAB
ALBUMIN SERPL BCP-MCNC: 2.5 G/DL
ALP SERPL-CCNC: 92 U/L
ALT SERPL W/O P-5'-P-CCNC: 14 U/L
ANION GAP SERPL CALC-SCNC: 9 MMOL/L
AST SERPL-CCNC: 20 U/L
BASOPHILS # BLD AUTO: 0.05 K/UL
BASOPHILS NFR BLD: 0.6 %
BILIRUB SERPL-MCNC: 0.4 MG/DL
BUN SERPL-MCNC: 15 MG/DL
CALCIUM SERPL-MCNC: 8.8 MG/DL
CHLORIDE SERPL-SCNC: 101 MMOL/L
CO2 SERPL-SCNC: 25 MMOL/L
CREAT SERPL-MCNC: 0.9 MG/DL
DIFFERENTIAL METHOD: ABNORMAL
EOSINOPHIL # BLD AUTO: 0.3 K/UL
EOSINOPHIL NFR BLD: 3.2 %
ERYTHROCYTE [DISTWIDTH] IN BLOOD BY AUTOMATED COUNT: 12.1 %
EST. GFR  (AFRICAN AMERICAN): >60 ML/MIN/1.73 M^2
EST. GFR  (NON AFRICAN AMERICAN): >60 ML/MIN/1.73 M^2
GLUCOSE SERPL-MCNC: 285 MG/DL
HCT VFR BLD AUTO: 36.8 %
HGB BLD-MCNC: 12.1 G/DL
IMM GRANULOCYTES # BLD AUTO: 0.14 K/UL
IMM GRANULOCYTES NFR BLD AUTO: 1.7 %
LYMPHOCYTES # BLD AUTO: 1.6 K/UL
LYMPHOCYTES NFR BLD: 20 %
MAGNESIUM SERPL-MCNC: 1.7 MG/DL
MCH RBC QN AUTO: 29 PG
MCHC RBC AUTO-ENTMCNC: 32.9 G/DL
MCV RBC AUTO: 88 FL
MONOCYTES # BLD AUTO: 0.9 K/UL
MONOCYTES NFR BLD: 11.4 %
NEUTROPHILS # BLD AUTO: 5.2 K/UL
NEUTROPHILS NFR BLD: 63.1 %
NRBC BLD-RTO: 0 /100 WBC
PHOSPHATE SERPL-MCNC: 3.4 MG/DL
PLATELET # BLD AUTO: 373 K/UL
PMV BLD AUTO: 8.7 FL
POCT GLUCOSE: 116 MG/DL (ref 70–110)
POCT GLUCOSE: 220 MG/DL (ref 70–110)
POCT GLUCOSE: 274 MG/DL (ref 70–110)
POCT GLUCOSE: 321 MG/DL (ref 70–110)
POCT GLUCOSE: 90 MG/DL (ref 70–110)
POTASSIUM SERPL-SCNC: 4.4 MMOL/L
PROT SERPL-MCNC: 7.1 G/DL
RBC # BLD AUTO: 4.17 M/UL
SODIUM SERPL-SCNC: 135 MMOL/L
WBC # BLD AUTO: 8.17 K/UL

## 2018-06-13 PROCEDURE — 11000001 HC ACUTE MED/SURG PRIVATE ROOM

## 2018-06-13 PROCEDURE — 85025 COMPLETE CBC W/AUTO DIFF WBC: CPT

## 2018-06-13 PROCEDURE — 84100 ASSAY OF PHOSPHORUS: CPT

## 2018-06-13 PROCEDURE — 25000003 PHARM REV CODE 250: Performed by: STUDENT IN AN ORGANIZED HEALTH CARE EDUCATION/TRAINING PROGRAM

## 2018-06-13 PROCEDURE — 99024 POSTOP FOLLOW-UP VISIT: CPT | Mod: ,,, | Performed by: PODIATRIST

## 2018-06-13 PROCEDURE — 83735 ASSAY OF MAGNESIUM: CPT

## 2018-06-13 PROCEDURE — 99232 SBSQ HOSP IP/OBS MODERATE 35: CPT | Mod: ,,, | Performed by: HOSPITALIST

## 2018-06-13 PROCEDURE — 63600175 PHARM REV CODE 636 W HCPCS: Performed by: STUDENT IN AN ORGANIZED HEALTH CARE EDUCATION/TRAINING PROGRAM

## 2018-06-13 PROCEDURE — 80053 COMPREHEN METABOLIC PANEL: CPT

## 2018-06-13 PROCEDURE — 63600175 PHARM REV CODE 636 W HCPCS: Performed by: PHYSICIAN ASSISTANT

## 2018-06-13 RX ORDER — INSULIN ASPART 100 [IU]/ML
25 INJECTION, SOLUTION INTRAVENOUS; SUBCUTANEOUS
Status: DISCONTINUED | OUTPATIENT
Start: 2018-06-13 | End: 2018-06-14

## 2018-06-13 RX ADMIN — Medication 1 CAPSULE: at 09:06

## 2018-06-13 RX ADMIN — Medication 400 MG: at 09:06

## 2018-06-13 RX ADMIN — CARVEDILOL 12.5 MG: 12.5 TABLET, FILM COATED ORAL at 09:06

## 2018-06-13 RX ADMIN — INSULIN DETEMIR 60 UNITS: 100 INJECTION, SOLUTION SUBCUTANEOUS at 10:06

## 2018-06-13 RX ADMIN — CEFAZOLIN 2 G: 1 INJECTION, POWDER, FOR SOLUTION INTRAMUSCULAR; INTRAVENOUS at 10:06

## 2018-06-13 RX ADMIN — INSULIN ASPART 4 UNITS: 100 INJECTION, SOLUTION INTRAVENOUS; SUBCUTANEOUS at 10:06

## 2018-06-13 RX ADMIN — CEFAZOLIN 2 G: 1 INJECTION, POWDER, FOR SOLUTION INTRAMUSCULAR; INTRAVENOUS at 05:06

## 2018-06-13 RX ADMIN — ASPIRIN 81 MG: 81 TABLET, COATED ORAL at 09:06

## 2018-06-13 RX ADMIN — CARVEDILOL 12.5 MG: 12.5 TABLET, FILM COATED ORAL at 10:06

## 2018-06-13 RX ADMIN — LISINOPRIL 40 MG: 20 TABLET ORAL at 09:06

## 2018-06-13 RX ADMIN — ENOXAPARIN SODIUM 40 MG: 100 INJECTION SUBCUTANEOUS at 05:06

## 2018-06-13 RX ADMIN — INSULIN ASPART 25 UNITS: 100 INJECTION, SOLUTION INTRAVENOUS; SUBCUTANEOUS at 06:06

## 2018-06-13 RX ADMIN — INSULIN ASPART 23 UNITS: 100 INJECTION, SOLUTION INTRAVENOUS; SUBCUTANEOUS at 12:06

## 2018-06-13 RX ADMIN — INSULIN ASPART 20 UNITS: 100 INJECTION, SOLUTION INTRAVENOUS; SUBCUTANEOUS at 09:06

## 2018-06-13 RX ADMIN — INSULIN ASPART 4 UNITS: 100 INJECTION, SOLUTION INTRAVENOUS; SUBCUTANEOUS at 09:06

## 2018-06-13 RX ADMIN — CEFAZOLIN 2 G: 1 INJECTION, POWDER, FOR SOLUTION INTRAMUSCULAR; INTRAVENOUS at 01:06

## 2018-06-13 RX ADMIN — Medication 400 MG: at 10:06

## 2018-06-13 RX ADMIN — ATORVASTATIN CALCIUM 80 MG: 20 TABLET, FILM COATED ORAL at 09:06

## 2018-06-13 NOTE — SUBJECTIVE & OBJECTIVE
Interval History:   NAEON. No fever or pain. Able to ambulate by bearing weight on lateral side of R foot. Having BM daily.    Review of Systems   Constitutional: Negative for chills, fever and unexpected weight change.   HENT: Negative for hearing loss.    Eyes: Negative for visual disturbance.   Respiratory: Negative for cough and shortness of breath.    Cardiovascular: Negative for chest pain, palpitations and leg swelling.   Gastrointestinal: Negative for abdominal distention, abdominal pain, blood in stool, constipation, diarrhea, nausea, rectal pain and vomiting.   Genitourinary: Negative for dysuria and hematuria.   Musculoskeletal: Negative for arthralgias.   Skin: Positive for color change, rash and wound.   Neurological: Negative for seizures, weakness and headaches.   Hematological: Negative for adenopathy. Does not bruise/bleed easily.     Objective:     Vital Signs (Most Recent):  Temp: 99.1 °F (37.3 °C) (06/13/18 1221)  Pulse: (!) 56 (06/13/18 1221)  Resp: 18 (06/13/18 1221)  BP: 122/72 (06/13/18 1221)  SpO2: 97 % (06/13/18 1221) Vital Signs (24h Range):  Temp:  [97.1 °F (36.2 °C)-99.1 °F (37.3 °C)] 99.1 °F (37.3 °C)  Pulse:  [54-68] 56  Resp:  [17-18] 18  SpO2:  [97 %-99 %] 97 %  BP: (115-125)/(63-79) 122/72     Weight: 122.5 kg (270 lb)  Body mass index is 35.62 kg/m².    Intake/Output Summary (Last 24 hours) at 06/13/18 1633  Last data filed at 06/13/18 0909   Gross per 24 hour   Intake             1090 ml   Output                0 ml   Net             1090 ml      Physical Exam   Constitutional: He is oriented to person, place, and time. He appears well-developed and well-nourished. No distress.   HENT:   Head: Normocephalic and atraumatic.   Eyes: Conjunctivae, EOM and lids are normal.   Neck: No tracheal deviation present. No thyromegaly present.   Cardiovascular: Normal rate, regular rhythm, normal heart sounds and intact distal pulses.    No murmur heard.  Pulmonary/Chest: Effort normal and  breath sounds normal. No respiratory distress. He has no wheezes. He has no rales.   Abdominal: Soft. He exhibits no distension and no mass. There is no tenderness. No hernia.   Musculoskeletal: He exhibits no edema or tenderness.   Right foot bandage, intact, clean, dry. R second toe discoloration improving.   Neurological: He is alert and oriented to person, place, and time. No cranial nerve deficit. He exhibits normal muscle tone.   Skin: Skin is warm and dry. Rash noted. He is not diaphoretic. There is erythema.   RLE erythema improving. Non tender, non edematous.   Psychiatric: He has a normal mood and affect.   Vitals reviewed.    Significant Labs: All pertinent labs within the past 24 hours have been reviewed.  CMP:   6/13/2018 05:33   Sodium 135 (L)   Potassium 4.4   Chloride 101   CO2 25   Anion Gap 9   BUN, Bld 15   Creatinine 0.9   eGFR if non African American >60.0   eGFR if African American >60.0   Glucose 285 (H)   Calcium 8.8   Phosphorus 3.4   Magnesium 1.7   Alkaline Phosphatase 92   Total Protein 7.1   Albumin 2.5 (L)   Total Bilirubin 0.4   AST 20   ALT 14     CBC:   6/13/2018 05:33   WBC 8.17   RBC 4.17 (L)   Hemoglobin 12.1 (L)   Hematocrit 36.8 (L)   MCV 88   MCH 29.0   MCHC 32.9   RDW 12.1   Platelets 373 (H)   MPV 8.7 (L)   Gran% 63.1   Gran # (ANC) 5.2   Immature Granulocytes 1.7 (H)   Immature Grans (Abs) 0.14 (H)   Lymph% 20.0   Lymph # 1.6   Mono% 11.4   Mono # 0.9   Eosinophil% 3.2   Eos # 0.3   Basophil% 0.6   Baso # 0.05   nRBC 0     DM panel:   6/12/2018 13:16 6/12/2018 16:56 6/12/2018 21:46 6/13/2018 02:57 6/13/2018 08:31 6/13/2018 13:01   POCT Glucose 222 (H) 150 (H) 236 (H) 274 (H) 220 (H) 116 (H)     Significant Imaging: I have reviewed and interpreted all pertinent imaging results/findings within the past 24 hours.   No new imaging.

## 2018-06-13 NOTE — PLAN OF CARE
Pt has been medically accepted to [facility] pending auth.      06/13/18 6171   Discharge Reassessment   Provided patient/caregiver education on the expected discharge date and the discharge plan Yes   Do you have any problems affording any of your prescribed medications? Yes   Discharge Plan A Long-term acute care facility (LTAC)   Discharge Plan B Skilled Nursing Facility   Patient choice form signed by patient/caregiver No   Can the patient answer the patient profile reliably? Yes, cognitively intact   How does the patient rate their overall health at the present time? Fair   Describe the patient's ability to walk at the present time. Minor restrictions or changes   How often would a person be available to care for the patient? Occasionally   Number of comorbid conditions (as recorded on the chart) Three   During the past month, has the patient often been bothered by feeling down, depressed or hopeless? Yes   During the past month, has the patient often been bothered by little interest or pleasure in doing things? Yes

## 2018-06-13 NOTE — PROGRESS NOTES
[facility] Hospital Medicine  Progress Note    Patient Name: Billy Sheffield Miguel  MRN: [id number]  Patient Class: IP- Inpatient   Admission Date: 6/1/2018  Length of Stay: 12 days  Attending Physician: Shree Arita MD  Primary Care Provider: BRAD Baker MD    [facility] Medicine Team: [facility] HOSP MED 2 Millicent Torre MD    Subjective:     Principal Problem:Diabetic ulcer of right midfoot    HPI:  Patient is a 52-year-old male with HTN, MI 2013 s/p 1 stent, DMT2, colon cancer s/p resection 2017 who presents after failing outpatient treatment for a right ulcer.  Patient states that he stepped on a piece of glass at work where he is  on the 19th of May and it went through his shoe. At that time he removed the glass, used antiseptic and antibacterial on his foot and felt that things are going OK. He didn't have any issues until last Saturday The 26th, when he noted that a puffy cherry blister was occurring on his plantar surface near the first digit. On Tuesday, the 29th he presented to his primary care physician in [place] where he was started on clinda and cipro. Patient notes that the clinda and Cipro gave him diarrhea and vomiting. He stated that he had diarrhea three times a day with some form, mostly liquid, no blood and it has been the same since it started. His vomiting occurred five times yesterday, and was brownish with food. His last diarrhea episode was at 10:30 this morning, and vomiting at 4:30 in the afternoon. The patient presented to his outside PCP today, without relief of symptoms on antibiotics. Patient denies fever, chills, nausea, vomiting, diarrhea, dysuria, rash, shortness of breath, chest pain, abdominal pain.  Patient states that his diabetes started 15 to 20 years ago, and has been reasonably well controlled since then with no neuropathy, optic issues or kidney issues. Patient notes that he had a stent in 2013 for coronary artery disease, and colon cancer  that was resected in April 2017 without any issues on follow up.    Hospital Course:  6/1/2018- admission date. Empirically started on daptomycin due to PCN allergy, aztreonam, flagyl for anaerobe coverage. Diarrhea resolved on admission, thought to be side effect of outpatient abx rather than c diff.  6/2/2018- Received OR debridement by podiatry. Tolerated procedure well. Had fever of 101.9 overnight. WBC persistently ~13. VASILIY 1.14 in LLE. RLE VASILIY unable to be obtained due to wound/infection.  6/3/2018- Aerobic wound culture grew strep agalactiae. Per ID recs, abx switched to cefazolin with no adverse effects. Fever of 101.4. Repeat XR of foot showed residual piece of glass in foot.  06/04/2018: Had not had any BM since admission. Was started on bowel regimen. However, pt start having diarrhea again, 4 episodes during the day, before receiving any bowel regimen. Lactulose and miralax were not given. Pt received 1 dose of senna-docusate overnight. Had 1 more episode of diarrhea overnight. Persistent hyponatremia with Na+ in low 130s. Urine studies showed low-normal urine sodium and normal osmolality. Fever of 101 recurred for 8 hours, resolved with tylenol. Pt denied symptoms during febrile episode. WBC slightly improved, left shift persisted. Thought to be due to residual foreign object embedded in foot.  06/05/2018: Recurrence of diarrhea thought to be secondary to antibiotics, given similar presentation while pt was previously on PO abx prior to admission. Started on probiotics. Stool became formed later in the day. Went to OR again for removal of remaining piece of glass in foot. Found to have purulent drainage from wound, which was debrided. However, glass was found to be too deeply embedded in tissue and was unable to be retrieved despite multiple attempts. Post op XR of foot showed soft tissue swelling and gas. Immediately post op, pt's fever recurred with temp up to 102 for 8 hours overnight.  6/6/2018:  reports fever and fatigue. Denies chills, myalgia, cough, sore throat, dysuria, WBC stable at 13. Denies foot pain or bleeding. Hgb stable. Right second toe noted to be blue/pale on exam. Thought to be bruising from pressure applied on toe in OR during debridement of foot vs ischemia (given unknown RLE VASILIY). Monitored for swelling, per podiatry recs.  06/07/2018: had fever of 101.1 F overnight that resolved with 1 dose of PO tylenol. VS otherwise stable. Pain controlled with oxycodone IR 5 mg x 1. WBC improved to 10. No BM since I/D two days ago. Went to OR again for I/D in attempt to remove residual piece of glass in wound, which successfully removed large piece of glass. Wound cultures were sent. Unfortunately, there was one small piece (<1 mm in size) residual in wound, which was confirmed on XR post op. Underwent BLE arterial angiogram for ischemic evaluation which showed 3 vessel run off.  06/08/2018: remained afebrile. WBC remained normal. Per ID recs, continued on IV abx for 6 week duration for presumed osteomyelitis, given proximity of foreign object/extent of wound to bone. PICC line was placed, confirmed by CXR. No BM since 6/5/18. Started on metamucil.  06/09/2018: remained afebrile with normal WBC. Blood cultures from 6/2/18 and 6/6/18 remained negative. Pain controlled with prn opiates. Started on senna-doc.  06/10/2018: remained afebrile with Tmax 100 F. HR and BP at goal, WBC wnl. Hgb remained stable, no signs of bleeding post op. Pain controlled, independently ambulated from bed to bathroom on heel of R foot, able to bear some weight over site of wound. R foot bandage changed daily. R second toe bruising improving. R leg cellulitis resolving. Had 1 formed stool yesterday and 1 this morning. Refused metamucil and senna-doc. BG in 140s-220s. Insulin regimen was increased.  06/11/2018: remained afebrile with Tm 98.9 F. Pain controlled, did not need any prn opiates for the past 3 days. No signs/sx of  bleeding from wound. Resumed on ASA 81 mg daily. Continued to have normal BM without taking metamucil and senna-doc. BG at lunch time, dinner time, bed time, and overnight improved to 180-200 on higher dose of insulin. Recurrent hypomagnesemia, corrected with IV supplement.   06/12/2018: Remained medically stable. Ambulating independently without significant pain. Having daily BM. Hyperglycemia controlled with increased insulin regimen.  06/13/2018: Remained medically stable. BG up to 210s-280s throughout the day and night. Insulin detemir was increased to 60 units qhs and insulin aspart was increased to 25 units TIDWM.    Pt is unable to be discharged home due to cost of wound vac and IV abx. SW/CM currently evaluating pt for LTAC. Plan to discharge with probiotics, bowel regimen, PO Mg+2 oxide supplement, new insulin regimen, and 6 week total course of cefazolin (via HH). Plan to follow up with podiatry, ID, and allergy (for evaluation of penicillin allergy).    Interval History:   NAEON. No fever or pain. Able to ambulate by bearing weight on lateral side of R foot. Having BM daily.    Review of Systems   Constitutional: Negative for chills, fever and unexpected weight change.   HENT: Negative for hearing loss.    Eyes: Negative for visual disturbance.   Respiratory: Negative for cough and shortness of breath.    Cardiovascular: Negative for chest pain, palpitations and leg swelling.   Gastrointestinal: Negative for abdominal distention, abdominal pain, blood in stool, constipation, diarrhea, nausea, rectal pain and vomiting.   Genitourinary: Negative for dysuria and hematuria.   Musculoskeletal: Negative for arthralgias.   Skin: Positive for color change, rash and wound.   Neurological: Negative for seizures, weakness and headaches.   Hematological: Negative for adenopathy. Does not bruise/bleed easily.     Objective:     Vital Signs (Most Recent):  Temp: 99.1 °F (37.3 °C) (06/13/18 1221)  Pulse: (!) 56  (06/13/18 1221)  Resp: 18 (06/13/18 1221)  BP: 122/72 (06/13/18 1221)  SpO2: 97 % (06/13/18 1221) Vital Signs (24h Range):  Temp:  [97.1 °F (36.2 °C)-99.1 °F (37.3 °C)] 99.1 °F (37.3 °C)  Pulse:  [54-68] 56  Resp:  [17-18] 18  SpO2:  [97 %-99 %] 97 %  BP: (115-125)/(63-79) 122/72     Weight: 122.5 kg (270 lb)  Body mass index is 35.62 kg/m².    Intake/Output Summary (Last 24 hours) at 06/13/18 1633  Last data filed at 06/13/18 0909   Gross per 24 hour   Intake             1090 ml   Output                0 ml   Net             1090 ml      Physical Exam   Constitutional: He is oriented to person, place, and time. He appears well-developed and well-nourished. No distress.   HENT:   Head: Normocephalic and atraumatic.   Eyes: Conjunctivae, EOM and lids are normal.   Neck: No tracheal deviation present. No thyromegaly present.   Cardiovascular: Normal rate, regular rhythm, normal heart sounds and intact distal pulses.    No murmur heard.  Pulmonary/Chest: Effort normal and breath sounds normal. No respiratory distress. He has no wheezes. He has no rales.   Abdominal: Soft. He exhibits no distension and no mass. There is no tenderness. No hernia.   Musculoskeletal: He exhibits no edema or tenderness.   Right foot bandage, intact, clean, dry. R second toe discoloration improving.   Neurological: He is alert and oriented to person, place, and time. No cranial nerve deficit. He exhibits normal muscle tone.   Skin: Skin is warm and dry. Rash noted. He is not diaphoretic. There is erythema.   RLE erythema improving. Non tender, non edematous.   Psychiatric: He has a normal mood and affect.   Vitals reviewed.    Significant Labs: All pertinent labs within the past 24 hours have been reviewed.  CMP:   6/13/2018 05:33   Sodium 135 (L)   Potassium 4.4   Chloride 101   CO2 25   Anion Gap 9   BUN, Bld 15   Creatinine 0.9   eGFR if non African American >60.0   eGFR if African American >60.0   Glucose 285 (H)   Calcium 8.8    Phosphorus 3.4   Magnesium 1.7   Alkaline Phosphatase 92   Total Protein 7.1   Albumin 2.5 (L)   Total Bilirubin 0.4   AST 20   ALT 14     CBC:   6/13/2018 05:33   WBC 8.17   RBC 4.17 (L)   Hemoglobin 12.1 (L)   Hematocrit 36.8 (L)   MCV 88   MCH 29.0   MCHC 32.9   RDW 12.1   Platelets 373 (H)   MPV 8.7 (L)   Gran% 63.1   Gran # (ANC) 5.2   Immature Granulocytes 1.7 (H)   Immature Grans (Abs) 0.14 (H)   Lymph% 20.0   Lymph # 1.6   Mono% 11.4   Mono # 0.9   Eosinophil% 3.2   Eos # 0.3   Basophil% 0.6   Baso # 0.05   nRBC 0     DM panel:   6/12/2018 13:16 6/12/2018 16:56 6/12/2018 21:46 6/13/2018 02:57 6/13/2018 08:31 6/13/2018 13:01   POCT Glucose 222 (H) 150 (H) 236 (H) 274 (H) 220 (H) 116 (H)     Significant Imaging: I have reviewed and interpreted all pertinent imaging results/findings within the past 24 hours.   No new imaging.    Assessment/Plan:      * Diabetic ulcer of right midfoot    Cellulitis  R foot wound with foreign object (glass)  - S/p OR debridement by podiatry on day 2. Aerobic wound culture on 6/2/18 grew strep agalactiae. Anaerobic culture pending. No signs of osteomyelitis. Blood culture NGTD. WBC persistently 13. Repeat XR foot showed residual piece of glass in wound. Developed recurrent fevers with temp up to 102 F. Blood cultures from 6/2/18 showed no growth. Multiple unsuccessful attempts to retrieve remaining piece of glass in foot in OR on day 5. Continued to have fever afterwards, consistent with lack of source control. Blood cultures on 6/6/18 negative. Repeat I/D on day 7 had successful removal of large piece of residual glass. However, one tiny residual piece remained, confirmed by XR post op. Wound cultures from OR again grew group B strep. Fever and WBC resolved afterwards. Did not require any prn opiates for pain post op.  - Currently asymptomatic. Cellulitis of R leg improving.  - Continue cefazolin 2 g IV q8h for 6 week course, for osteomyelitis coverage, given proximity of  foreign object to bone (started on day 3, end date 7/14/18). S/p aztreonam, flagyl, daptomycin initially. PICC line placed on 6/8/18 for prolonged abx course.  - Plan to discharge to LTAC with wound vac, per podiatry.  - Follow up with podiatry and ID in clinic.        Type 2 diabetes mellitus 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, with long-term current use of insulin    - Uncontrolled outpatient with hgb A1C of 12, 's, and anion gap 14 on admission (previously 9.7 in 2/2018). On insulin glargine 55 units, aspart 20 units TIDWM, metformin, trulicity at home.  - BG currently low to high 200s throughout the day/night.  - Increase insulin detemir to 60 units qhs.  - Increase insulin aspart to 25 units TIDWM.  - Continue SSI.  - POCT glucose AC & HS.  - Holding home metformin and trulicity while in house.        HTN (hypertension)    - On lisinopril 40 mg daily and carvedilol 12.5 mg BID at home.  - BP currently at goal.  - Continue home ACE-I.  - Continue home BB.  - Monitor.        CAD s/p PCI of LAD (SHELBY) in May 2013    - On ASA 81 mg daily and atorvastatin 80 mg daily at home.  - Home ASA held on admission in the setting of pending surgery.  - Remained stable and asymptomatic throughout course.  - Resume home ASA.  - Continue home statin.        Dyslipidemia    - On atorvastatin 80 mg daily at home.  - Continue home statin.          VTE Risk Mitigation         Ordered     enoxaparin injection 40 mg  Daily      06/05/18 1706     IP VTE HIGH RISK PATIENT  Once      06/02/18 0504        Millicent Torre MD  Department of Hospital Medicine   [facility]

## 2018-06-13 NOTE — ASSESSMENT & PLAN NOTE
Cellulitis  R foot wound with foreign object (glass)  - S/p OR debridement by podiatry on day 2. Aerobic wound culture on 6/2/18 grew strep agalactiae. Anaerobic culture pending. No signs of osteomyelitis. Blood culture NGTD. WBC persistently 13. Repeat XR foot showed residual piece of glass in wound. Developed recurrent fevers with temp up to 102 F. Blood cultures from 6/2/18 showed no growth. Multiple unsuccessful attempts to retrieve remaining piece of glass in foot in OR on day 5. Continued to have fever afterwards, consistent with lack of source control. Blood cultures on 6/6/18 negative. Repeat I/D on day 7 had successful removal of large piece of residual glass. However, one tiny residual piece remained, confirmed by XR post op. Wound cultures from OR again grew group B strep. Fever and WBC resolved afterwards. Did not require any prn opiates for pain post op.  - Currently asymptomatic. Cellulitis of R leg improving.  - Continue cefazolin 2 g IV q8h for 6 week course, for osteomyelitis coverage, given proximity of foreign object to bone (started on day 3, end date 7/14/18). S/p aztreonam, flagyl, daptomycin initially. PICC line placed on 6/8/18 for prolonged abx course.  - Plan to discharge to LTAC with wound vac, per podiatry.  - Follow up with podiatry and ID in clinic.

## 2018-06-13 NOTE — PLAN OF CARE
Problem: Diabetes, Type 2 (Adult)  Goal: Signs and Symptoms of Listed Potential Problems Will be Absent, Minimized or Managed (Diabetes, Type 2)  Signs and symptoms of listed potential problems will be absent, minimized or managed by discharge/transition of care (reference Diabetes, Type 2 (Adult) CPG).   Outcome: Ongoing (interventions implemented as appropriate)   06/13/18 1840   Diabetes, Type 2   Problems Assessed (Type 2 Diabetes) all   Problems Present (Type 2 Diabetes) none       Problem: Patient Care Overview  Goal: Plan of Care Review  Outcome: Ongoing (interventions implemented as appropriate)   06/13/18 1640   Coping/Psychosocial   Plan Of Care Reviewed With patient;spouse       No distress/discomfort noted in pt. blood glucose monitored before meals and insulin administered as ordered, pt tolerated diet & tray offered. pt states he only wants 10 units insulin before dinner. No s/s of hyper/hypoglycemia noted in pt. Pt refused nurse visualizing wound, states wound care only to be performed by podiatrist. Wound to rt foot clean, dry & intact, BLE elevated. Will continue to monitor pt, all precautions maintained.     Problem: Fall Risk (Adult)  Goal: Absence of Falls  Patient will demonstrate the desired outcomes by discharge/transition of care.   Outcome: Ongoing (interventions implemented as appropriate)   06/13/18 1843   Fall Risk (Adult)   Absence of Falls making progress toward outcome

## 2018-06-13 NOTE — ASSESSMENT & PLAN NOTE
- Uncontrolled outpatient with hgb A1C of 12, 's, and anion gap 14 on admission (previously 9.7 in 2/2018). On insulin glargine 55 units, aspart 20 units TIDWM, metformin, trulicity at home.  - BG currently low to high 200s throughout the day/night.  - Increase insulin detemir to 60 units qhs.  - Increase insulin aspart to 25 units TIDWM.  - Continue SSI.  - POCT glucose AC & HS.  - Holding home metformin and trulicity while in house.

## 2018-06-14 LAB
ALBUMIN SERPL BCP-MCNC: 2.6 G/DL
ALP SERPL-CCNC: 88 U/L
ALT SERPL W/O P-5'-P-CCNC: 14 U/L
ANION GAP SERPL CALC-SCNC: 8 MMOL/L
AST SERPL-CCNC: 19 U/L
BASOPHILS # BLD AUTO: 0.05 K/UL
BASOPHILS NFR BLD: 0.7 %
BILIRUB SERPL-MCNC: 0.4 MG/DL
BUN SERPL-MCNC: 16 MG/DL
CALCIUM SERPL-MCNC: 9 MG/DL
CHLORIDE SERPL-SCNC: 100 MMOL/L
CO2 SERPL-SCNC: 27 MMOL/L
CREAT SERPL-MCNC: 0.8 MG/DL
DIFFERENTIAL METHOD: ABNORMAL
EOSINOPHIL # BLD AUTO: 0.3 K/UL
EOSINOPHIL NFR BLD: 3.5 %
ERYTHROCYTE [DISTWIDTH] IN BLOOD BY AUTOMATED COUNT: 12.2 %
EST. GFR  (AFRICAN AMERICAN): >60 ML/MIN/1.73 M^2
EST. GFR  (NON AFRICAN AMERICAN): >60 ML/MIN/1.73 M^2
GLUCOSE SERPL-MCNC: 272 MG/DL
HCT VFR BLD AUTO: 36.2 %
HGB BLD-MCNC: 12.1 G/DL
IMM GRANULOCYTES # BLD AUTO: 0.08 K/UL
IMM GRANULOCYTES NFR BLD AUTO: 1.1 %
LYMPHOCYTES # BLD AUTO: 1.8 K/UL
LYMPHOCYTES NFR BLD: 23.9 %
MAGNESIUM SERPL-MCNC: 1.7 MG/DL
MCH RBC QN AUTO: 29.2 PG
MCHC RBC AUTO-ENTMCNC: 33.4 G/DL
MCV RBC AUTO: 87 FL
MONOCYTES # BLD AUTO: 0.8 K/UL
MONOCYTES NFR BLD: 10.5 %
NEUTROPHILS # BLD AUTO: 4.5 K/UL
NEUTROPHILS NFR BLD: 60.3 %
NRBC BLD-RTO: 0 /100 WBC
PHOSPHATE SERPL-MCNC: 3.7 MG/DL
PLATELET # BLD AUTO: 359 K/UL
PMV BLD AUTO: 8.9 FL
POCT GLUCOSE: 174 MG/DL (ref 70–110)
POCT GLUCOSE: 199 MG/DL (ref 70–110)
POCT GLUCOSE: 233 MG/DL (ref 70–110)
POCT GLUCOSE: 273 MG/DL (ref 70–110)
POCT GLUCOSE: 273 MG/DL (ref 70–110)
POTASSIUM SERPL-SCNC: 4.7 MMOL/L
PROT SERPL-MCNC: 7.1 G/DL
RBC # BLD AUTO: 4.14 M/UL
SODIUM SERPL-SCNC: 135 MMOL/L
WBC # BLD AUTO: 7.44 K/UL

## 2018-06-14 PROCEDURE — 99024 POSTOP FOLLOW-UP VISIT: CPT | Mod: ,,, | Performed by: PODIATRIST

## 2018-06-14 PROCEDURE — 63600175 PHARM REV CODE 636 W HCPCS: Performed by: PHYSICIAN ASSISTANT

## 2018-06-14 PROCEDURE — 83735 ASSAY OF MAGNESIUM: CPT

## 2018-06-14 PROCEDURE — 80053 COMPREHEN METABOLIC PANEL: CPT

## 2018-06-14 PROCEDURE — 99232 SBSQ HOSP IP/OBS MODERATE 35: CPT | Mod: ,,, | Performed by: HOSPITALIST

## 2018-06-14 PROCEDURE — 63600175 PHARM REV CODE 636 W HCPCS: Performed by: STUDENT IN AN ORGANIZED HEALTH CARE EDUCATION/TRAINING PROGRAM

## 2018-06-14 PROCEDURE — 11000001 HC ACUTE MED/SURG PRIVATE ROOM

## 2018-06-14 PROCEDURE — 84100 ASSAY OF PHOSPHORUS: CPT

## 2018-06-14 PROCEDURE — 25000003 PHARM REV CODE 250: Performed by: STUDENT IN AN ORGANIZED HEALTH CARE EDUCATION/TRAINING PROGRAM

## 2018-06-14 PROCEDURE — 85025 COMPLETE CBC W/AUTO DIFF WBC: CPT

## 2018-06-14 RX ORDER — INSULIN ASPART 100 [IU]/ML
28 INJECTION, SOLUTION INTRAVENOUS; SUBCUTANEOUS
Status: DISCONTINUED | OUTPATIENT
Start: 2018-06-14 | End: 2018-06-15

## 2018-06-14 RX ADMIN — INSULIN DETEMIR 60 UNITS: 100 INJECTION, SOLUTION SUBCUTANEOUS at 09:06

## 2018-06-14 RX ADMIN — INSULIN ASPART 28 UNITS: 100 INJECTION, SOLUTION INTRAVENOUS; SUBCUTANEOUS at 06:06

## 2018-06-14 RX ADMIN — CEFAZOLIN 2 G: 1 INJECTION, POWDER, FOR SOLUTION INTRAMUSCULAR; INTRAVENOUS at 01:06

## 2018-06-14 RX ADMIN — ENOXAPARIN SODIUM 40 MG: 100 INJECTION SUBCUTANEOUS at 05:06

## 2018-06-14 RX ADMIN — INSULIN ASPART 2 UNITS: 100 INJECTION, SOLUTION INTRAVENOUS; SUBCUTANEOUS at 01:06

## 2018-06-14 RX ADMIN — ATORVASTATIN CALCIUM 80 MG: 20 TABLET, FILM COATED ORAL at 09:06

## 2018-06-14 RX ADMIN — CEFAZOLIN 2 G: 1 INJECTION, POWDER, FOR SOLUTION INTRAMUSCULAR; INTRAVENOUS at 05:06

## 2018-06-14 RX ADMIN — CARVEDILOL 12.5 MG: 12.5 TABLET, FILM COATED ORAL at 09:06

## 2018-06-14 RX ADMIN — INSULIN ASPART 6 UNITS: 100 INJECTION, SOLUTION INTRAVENOUS; SUBCUTANEOUS at 09:06

## 2018-06-14 RX ADMIN — INSULIN ASPART 25 UNITS: 100 INJECTION, SOLUTION INTRAVENOUS; SUBCUTANEOUS at 12:06

## 2018-06-14 RX ADMIN — Medication 1 CAPSULE: at 09:06

## 2018-06-14 RX ADMIN — Medication 400 MG: at 08:06

## 2018-06-14 RX ADMIN — INSULIN ASPART 25 UNITS: 100 INJECTION, SOLUTION INTRAVENOUS; SUBCUTANEOUS at 09:06

## 2018-06-14 RX ADMIN — ASPIRIN 81 MG: 81 TABLET, COATED ORAL at 09:06

## 2018-06-14 RX ADMIN — INSULIN ASPART 2 UNITS: 100 INJECTION, SOLUTION INTRAVENOUS; SUBCUTANEOUS at 06:06

## 2018-06-14 RX ADMIN — LISINOPRIL 40 MG: 20 TABLET ORAL at 08:06

## 2018-06-14 RX ADMIN — Medication 400 MG: at 09:06

## 2018-06-14 RX ADMIN — CARVEDILOL 12.5 MG: 12.5 TABLET, FILM COATED ORAL at 08:06

## 2018-06-14 RX ADMIN — CEFAZOLIN 2 G: 1 INJECTION, POWDER, FOR SOLUTION INTRAMUSCULAR; INTRAVENOUS at 09:06

## 2018-06-14 NOTE — ASSESSMENT & PLAN NOTE
Cellulitis  R foot wound with foreign object (glass)  - S/p OR debridement by podiatry on day 2. Aerobic wound culture on 6/2/18 grew strep agalactiae. Anaerobic culture pending. No signs of osteomyelitis. Blood culture NGTD. WBC persistently 13. Repeat XR foot showed residual piece of glass in wound. Developed recurrent fevers with temp up to 102 F. Blood cultures from 6/2/18 showed no growth. Multiple unsuccessful attempts to retrieve remaining piece of glass in foot in OR on day 5. Continued to have fever afterwards, consistent with lack of source control. Blood cultures on 6/6/18 negative. Repeat I/D on day 7 had successful removal of large piece of residual glass. However, one tiny residual piece remained, confirmed by XR post op. Wound cultures from OR again grew group B strep. Fever and WBC resolved afterwards. Did not require any prn opiates for pain post op.  - Currently asymptomatic. Cellulitis of R leg improving.  - Continue cefazolin 2 g IV q8h for 6 week course, for osteomyelitis coverage, given proximity of foreign object to bone (started on day 3, end date 7/14/18). S/p aztreonam, flagyl, daptomycin initially. PICC line placed on 6/8/18 for prolonged abx course.  - Plan to discharge to facility with wound vac, per podiatry.  - Follow up with podiatry and ID in clinic.

## 2018-06-14 NOTE — PLAN OF CARE
Sw informed by Edita with LTAC at [facility] that insurance denied Pt's LTAC request and recommend SNF. Sw to follow with Pt's option of appeal or SNF/NH. Sw to follow. Pt wants to appeal decision, physician informed, Pt is being informed of NH/SNF per insurance request. Pt wants [place] area, unsure if insurance can contract with these facilities. Sw to follow.

## 2018-06-14 NOTE — PROGRESS NOTES
[facility] Hospital Medicine  Progress Note    Patient Name: Billy Sheffield Miguel  MRN: [id number]  Patient Class: IP- Inpatient   Admission Date: 6/1/2018  Length of Stay: 13 days  Attending Physician: Shree Arita MD  Primary Care Provider: BRAD Baker MD    [facility] Medicine Team: [facility] HOSP MED 2 Shan Soto MD    Subjective:     Principal Problem:Diabetic ulcer of right midfoot    HPI:  Patient is a 52-year-old male with HTN, MI 2013 s/p 1 stent, DMT2, colon cancer s/p resection 2017 who presents after failing outpatient treatment for a right ulcer.  Patient states that he stepped on a piece of glass at work where he is  on the 19th of May and it went through his shoe. At that time he removed the glass, used antiseptic and antibacterial on his foot and felt that things are going OK. He didn't have any issues until last Saturday The 26th, when he noted that a puffy cherry blister was occurring on his plantar surface near the first digit. On Tuesday, the 29th he presented to his primary care physician in [place] [facility] where he was started on clinda and cipro. Patient notes that the clinda and Cipro gave him diarrhea and vomiting. He stated that he had diarrhea three times a day with some form, mostly liquid, no blood and it has been the same since it started. His vomiting occurred five times yesterday, and was brownish with food. His last diarrhea episode was at 10:30 this morning, and vomiting at 4:30 in the afternoon. The patient presented to his outside PCP today, without relief of symptoms on antibiotics. Patient denies fever, chills, nausea, vomiting, diarrhea, dysuria, rash, shortness of breath, chest pain, abdominal pain.  Patient states that his diabetes started 15 to 20 years ago, and has been reasonably well controlled since then with no neuropathy, optic issues or kidney issues. Patient notes that he had a stent in 2013 for coronary artery disease, and colon cancer  that was resected in April 2017 without any issues on follow up.    Hospital Course:  6/1/2018- admission date. Empirically started on daptomycin due to PCN allergy, aztreonam, flagyl for anaerobe coverage. Diarrhea resolved on admission, thought to be side effect of outpatient abx rather than c diff.  6/2/2018- Received OR debridement by podiatry. Tolerated procedure well. Had fever of 101.9 overnight. WBC persistently ~13. VASILIY 1.14 in LLE. RLE VASILIY unable to be obtained due to wound/infection.  6/3/2018- Aerobic wound culture grew strep agalactiae. Per ID recs, abx switched to cefazolin with no adverse effects. Fever of 101.4. Repeat XR of foot showed residual piece of glass in foot.  06/04/2018: Had not had any BM since admission. Was started on bowel regimen. However, pt start having diarrhea again, 4 episodes during the day, before receiving any bowel regimen. Lactulose and miralax were not given. Pt received 1 dose of senna-docusate overnight. Had 1 more episode of diarrhea overnight. Persistent hyponatremia with Na+ in low 130s. Urine studies showed low-normal urine sodium and normal osmolality. Fever of 101 recurred for 8 hours, resolved with tylenol. Pt denied symptoms during febrile episode. WBC slightly improved, left shift persisted. Thought to be due to residual foreign object embedded in foot.  06/05/2018: Recurrence of diarrhea thought to be secondary to antibiotics, given similar presentation while pt was previously on PO abx prior to admission. Started on probiotics. Stool became formed later in the day. Went to OR again for removal of remaining piece of glass in foot. Found to have purulent drainage from wound, which was debrided. However, glass was found to be too deeply embedded in tissue and was unable to be retrieved despite multiple attempts. Post op XR of foot showed soft tissue swelling and gas. Immediately post op, pt's fever recurred with temp up to 102 for 8 hours overnight.  6/6/2018:  reports fever and fatigue. Denies chills, myalgia, cough, sore throat, dysuria, WBC stable at 13. Denies foot pain or bleeding. Hgb stable. Right second toe noted to be blue/pale on exam. Thought to be bruising from pressure applied on toe in OR during debridement of foot vs ischemia (given unknown RLE VASILIY). Monitored for swelling, per podiatry recs.  06/07/2018: had fever of 101.1 F overnight that resolved with 1 dose of PO tylenol. VS otherwise stable. Pain controlled with oxycodone IR 5 mg x 1. WBC improved to 10. No BM since I/D two days ago. Went to OR again for I/D in attempt to remove residual piece of glass in wound, which successfully removed large piece of glass. Wound cultures were sent. Unfortunately, there was one small piece (<1 mm in size) residual in wound, which was confirmed on XR post op. Underwent BLE arterial angiogram for ischemic evaluation which showed 3 vessel run off.  06/08/2018: remained afebrile. WBC remained normal. Per ID recs, continued on IV abx for 6 week duration for presumed osteomyelitis, given proximity of foreign object/extent of wound to bone. PICC line was placed, confirmed by CXR. No BM since 6/5/18. Started on metamucil.  06/09/2018: remained afebrile with normal WBC. Blood cultures from 6/2/18 and 6/6/18 remained negative. Pain controlled with prn opiates. Started on senna-doc.  06/10/2018: remained afebrile with Tmax 100 F. HR and BP at goal, WBC wnl. Hgb remained stable, no signs of bleeding post op. Pain controlled, independently ambulated from bed to bathroom on heel of R foot, able to bear some weight over site of wound. R foot bandage changed daily. R second toe bruising improving. R leg cellulitis resolving. Had 1 formed stool yesterday and 1 this morning. Refused metamucil and senna-doc. BG in 140s-220s. Insulin regimen was increased.  06/11/2018: remained afebrile with Tm 98.9 F. Pain controlled, did not need any prn opiates for the past 3 days. No signs/sx of  bleeding from wound. Resumed on ASA 81 mg daily. Continued to have normal BM without taking metamucil and senna-doc. BG at lunch time, dinner time, bed time, and overnight improved to 180-200 on higher dose of insulin. Recurrent hypomagnesemia, corrected with IV supplement.   06/12/2018: Remained medically stable. Ambulating independently without significant pain. Having daily BM. Hyperglycemia controlled with increased insulin regimen.  06/13/2018: Remained medically stable. BG up to 210s-280s throughout the day and night. Insulin detemir was increased to 60 units qhs and insulin aspart was increased to 25 units TIDWM.    Pt is unable to be discharged home due to cost of wound vac and IV abx. SW/CM currently evaluating pt for LTAC. Plan to discharge with probiotics, bowel regimen, PO Mg+2 oxide supplement, new insulin regimen, and 6 week total course of cefazolin (via ). Plan to follow up with podiatry, ID, and allergy (for evaluation of penicillin allergy).    Interval History: Afebrile, no acute overnight events. Patient unable to afford wound vac and IV abx costs at home and insurance denied LTAC. Patient wanting to appeal decision for LTAC, however, SW also providing SNF options.    Review of Systems  Objective:     Vital Signs (Most Recent):  Temp: 97.8 °F (36.6 °C) (06/14/18 1213)  Pulse: (!) 59 (06/14/18 1213)  Resp: 18 (06/14/18 1213)  BP: (!) 107/57 (06/14/18 1213)  SpO2: 97 % (06/14/18 1213) Vital Signs (24h Range):  Temp:  [97.8 °F (36.6 °C)-98.9 °F (37.2 °C)] 97.8 °F (36.6 °C)  Pulse:  [55-75] 59  Resp:  [18-20] 18  SpO2:  [96 %-98 %] 97 %  BP: (107-136)/(57-83) 107/57     Weight: 122.5 kg (270 lb)  Body mass index is 35.62 kg/m².    Intake/Output Summary (Last 24 hours) at 06/14/18 1301  Last data filed at 06/14/18 1051   Gross per 24 hour   Intake             1640 ml   Output             1640 ml   Net                0 ml      Physical Exam   Constitutional: He is oriented to person, place, and time.  He appears well-developed and well-nourished. No distress.   HENT:   Head: Normocephalic and atraumatic.   Eyes: Conjunctivae, EOM and lids are normal.   Neck: No tracheal deviation present. No thyromegaly present.   Cardiovascular: Normal rate, regular rhythm, normal heart sounds and intact distal pulses.    No murmur heard.  Pulmonary/Chest: Effort normal and breath sounds normal. No respiratory distress. He has no wheezes. He has no rales.   Abdominal: Soft. He exhibits no distension. There is no tenderness.   Musculoskeletal: He exhibits no edema or tenderness.   Right foot bandage, intact, clean, dry. R second toe discoloration improving.   Neurological: He is alert and oriented to person, place, and time. No cranial nerve deficit. He exhibits normal muscle tone.   Skin: Skin is warm and dry. He is not diaphoretic. There is erythema (mild, improving).   RLE erythema improving. Non tender, non edematous.   Psychiatric: He has a normal mood and affect.   Vitals reviewed.      Significant Labs:   CBC:   Recent Labs  Lab 06/13/18  0533 06/14/18  0523   WBC 8.17 7.44   HGB 12.1* 12.1*   HCT 36.8* 36.2*   * 359*     CMP:   Recent Labs  Lab 06/13/18  0533 06/14/18  0523   * 135*   K 4.4 4.7    100   CO2 25 27   * 272*   BUN 15 16   CREATININE 0.9 0.8   CALCIUM 8.8 9.0   PROT 7.1 7.1   ALBUMIN 2.5* 2.6*   BILITOT 0.4 0.4   ALKPHOS 92 88   AST 20 19   ALT 14 14   ANIONGAP 9 8   EGFRNONAA >60.0 >60.0       Significant Imaging: I have reviewed all pertinent imaging results/findings within the past 24 hours.    Assessment/Plan:      * Diabetic ulcer of right midfoot    Cellulitis  R foot wound with foreign object (glass)  - S/p OR debridement by podiatry on day 2. Aerobic wound culture on 6/2/18 grew strep agalactiae. Anaerobic culture pending. No signs of osteomyelitis. Blood culture NGTD. WBC persistently 13. Repeat XR foot showed residual piece of glass in wound. Developed recurrent fevers with  temp up to 102 F. Blood cultures from 6/2/18 showed no growth. Multiple unsuccessful attempts to retrieve remaining piece of glass in foot in OR on day 5. Continued to have fever afterwards, consistent with lack of source control. Blood cultures on 6/6/18 negative. Repeat I/D on day 7 had successful removal of large piece of residual glass. However, one tiny residual piece remained, confirmed by XR post op. Wound cultures from OR again grew group B strep. Fever and WBC resolved afterwards. Did not require any prn opiates for pain post op.  - Currently asymptomatic. Cellulitis of R leg improving.  - Continue cefazolin 2 g IV q8h for 6 week course, for osteomyelitis coverage, given proximity of foreign object to bone (started on day 3, end date 7/14/18). S/p aztreonam, flagyl, daptomycin initially. PICC line placed on 6/8/18 for prolonged abx course.  - Plan to discharge to facility with wound vac, per podiatry.  - Follow up with podiatry and ID in clinic.        Type 2 diabetes mellitus 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, with long-term current use of insulin    - Uncontrolled outpatient with hgb A1C of 12, 's, and anion gap 14 on admission (previously 9.7 in 2/2018). On insulin glargine 55 units, aspart 20 units TIDWM, metformin, trulicity at home.  - BG currently low to high 200s throughout the day/night.  - Increase insulin detemir to 60 units qhs.  - Increase insulin aspart to 28 units TIDWM.  - Continue SSI.  - POCT glucose AC & HS.  - Holding home metformin and trulicity while in house.        CAD s/p PCI of LAD (SHELBY) in May 2013    - On ASA 81 mg daily and atorvastatin 80 mg daily at home.  - Home ASA held on admission in the setting of pending surgery.  - Remained stable and asymptomatic throughout course.  - Resume home ASA.  - Continue home statin.        Dyslipidemia    - On atorvastatin 80 mg daily at home.  - Continue home statin.        HTN  (hypertension)    - On lisinopril 40 mg daily and carvedilol 12.5 mg BID at home.  - BP currently at goal.  - Continue home ACE-I.  - Continue home BB.  - Monitor.          VTE Risk Mitigation         Ordered     enoxaparin injection 40 mg  Daily      06/05/18 1706     IP VTE HIGH RISK PATIENT  Once      06/02/18 0504              Shan Soto MD  Department of Hospital Medicine   [facility]

## 2018-06-14 NOTE — SUBJECTIVE & OBJECTIVE
Interval Hx: S/p  right foot I&D DOS:6/2/18, 6/5/18 and 6/7/18 . Reports pain improved well controlled with pain meds. Removed large piece of glass from wound however  xrays reviewed noting residual foreign body in foot. Pending application of Home wound VAC.     Scheduled Meds:   aspirin  81 mg Oral Daily    atorvastatin  80 mg Oral Daily    carvedilol  12.5 mg Oral BID    ceFAZolin (ANCEF) IVPB  2 g Intravenous Q8H    enoxaparin  40 mg Subcutaneous Daily    insulin aspart U-100  28 Units Subcutaneous TIDWM    insulin detemir U-100  60 Units Subcutaneous QHS    Lactobacillus rhamnosus GG  1 capsule Oral Daily    lisinopril  40 mg Oral Daily    magnesium oxide  400 mg Oral BID    psyllium husk (aspartame)  1 packet Oral Daily    senna-docusate 8.6-50 mg  1 tablet Oral Daily     Continuous Infusions:  PRN Meds:acetaminophen, dextrose 50%, dextrose 50%, glucagon (human recombinant), glucose, glucose, insulin aspart U-100, ondansetron, oxyCODONE, sodium chloride 0.9%, sodium chloride 0.9%, sodium chloride 0.9%    Review of patient's allergies indicates:   Allergen Reactions    Penicillins Anaphylaxis    Shellfish containing products      Other reaction(s): Unknown    Vancomycin Itching    Bactrim  [sulfamethoxazole-trimethoprim] Rash        Past Medical History:   Diagnosis Date    Allergy     CAD (coronary artery disease), native coronary artery 6/25/2013    Cancer     Diabetes mellitus     Diabetes mellitus, type 2     Disorder of kidney and ureter     Heart attack 04/2012    Hyperlipidemia     Hypertension     Muscular pain     post-op after colonoscopy     Past Surgical History:   Procedure Laterality Date    COLONOSCOPY N/A 2/17/2017    Procedure: COLONOSCOPY;  Surgeon: Simon Gomez MD;  Location: [address]);  Service: Endoscopy;  Laterality: N/A;    CORONARY ANGIOPLASTY      INCISION AND DRAINAGE FOOT Right 6/5/2018    Procedure: INCISION AND DRAINAGE, FOOT;  Surgeon:  Bridget Santana DPM;  Location: [facility] OR 1ST FLR;  Service: Podiatry;  Laterality: Right;  Request mini C arm in room. request wound vac with medium black sponge    INCISION AND DRAINAGE FOOT Right 6/7/2018    Procedure: INCISION AND DRAINAGE, FOOT;  Surgeon: Emelia Brock DPM;  Location: [facility] OR 2ND FLR;  Service: Podiatry;  Laterality: Right;    INCISION AND DRAINAGE OF ABSCESS Right 6/2/2018    Procedure: INCISION AND DRAINAGE, ABSCESS;  Surgeon: Bridget Santana DPM;  [facility];  Service: General;  Laterality: Right;    REMOVAL OF FOREIGN BODY FROM FOOT Right 6/7/2018    Procedure: REMOVAL, FOREIGN BODY, FOOT;  Surgeon: Emelia Brock DPM;  [facility];  Service: Podiatry;  Laterality: Right;    TONSILLECTOMY         Family History     Problem Relation (Age of Onset)    Heart disease Mother, Brother        Social History Main Topics    Smoking status: Never Smoker    Smokeless tobacco: Never Used    Alcohol use Yes      Comment: rare x1 beer-     Drug use: No    Sexual activity: Not Currently     Review of Systems   Constitutional: Positive for activity change.   Respiratory: Negative for shortness of breath.    Cardiovascular: Negative for chest pain and leg swelling.   Gastrointestinal: Negative for nausea and vomiting.   Genitourinary: Negative.    Musculoskeletal: Positive for arthralgias.   Skin: Positive for wound.   Neurological: Negative for weakness and numbness.   Psychiatric/Behavioral: Negative.      Objective:     Vital Signs (Most Recent):  Temp: 97.8 °F (36.6 °C) (06/14/18 1213)  Pulse: (!) 58 (06/14/18 1500)  Resp: 18 (06/14/18 1213)  BP: (!) 107/57 (06/14/18 1213)  SpO2: 97 % (06/14/18 1213) Vital Signs (24h Range):  Temp:  [97.8 °F (36.6 °C)-98.9 °F (37.2 °C)] 97.8 °F (36.6 °C)  Pulse:  [55-75] 58  Resp:  [18-20] 18  SpO2:  [96 %-98 %] 97 %  BP: (107-136)/(57-83) 107/57     Weight: 122.5 kg (270 lb)  Body mass index is 35.62 kg/m².    Foot  Exam    General  Orientation: alert and oriented to person, place, and time       Right Foot/Ankle     Inspection and Palpation  Tenderness: metatarsals   Swelling: metatarsals   Skin Exam: ulcer;     Neurovascular  Dorsalis pedis: 1+  Posterior tibial: 1+      Left Foot/Ankle      Inspection and Palpation  Tenderness: none   Swelling: none   Skin Exam: erythema;     Neurovascular  Dorsalis pedis: 1+  Posterior tibial: 1+          6/11/18    Wound 1:Plantar right foot   Measurement: 5cmx0.2cmx0.5cm  Base: fibrous   Periwound skin: macerated tissue -   Drainage: serous  Erythema: continued erythema noted to right foot.   Probe: negatetive probe to bone.     Left second toe with dusky appearance- improved.     Wound 1: Right dorsal foot.   Measurement:2 incisions one at base of 2nd-- 0.5cmx0.5cmx0.5cm and 3rd toe. One at plantar foot-[id number] , 2nd interdigital space 1.0cmx0.5cmx0.1cm.    Base: fibrous base   Periwound skin: erythema improved.   Drainage: serous,   Erythema: moderate - improved.   Probe: none, no bone exposed    Right dorsal suture site with ischemic appearance.                               Gentian violet applied to right plantar foot.                           Laboratory:  CBC:     Recent Labs  Lab 06/14/18  0523   WBC 7.44   RBC 4.14*   HGB 12.1*   HCT 36.2*   *   MCV 87   MCH 29.2   MCHC 33.4     CMP:     Recent Labs  Lab 06/14/18  0523   *   CALCIUM 9.0   ALBUMIN 2.6*   PROT 7.1   *   K 4.7   CO2 27      BUN 16   CREATININE 0.8   ALKPHOS 88   ALT 14   AST 19   BILITOT 0.4       Diagnostic Results:  Xray: There is DJD and spurs on the calcaneus.  There is a foreign body soft tissues below in between the 1st and 2nd metatarsals thought to be a shard of glass.  There may be soft tissue swelling.  No fracture dislocation bone destruction seen.    MRI: No MR evidence of osteomyelitis.    Diffuse subcutaneous and myofascial forefoot edema with multiple plantar skin  defects, overall, concerning for cellulitis/myositis.  Associated radiopaque foreign body likely reflecting glass is more conspicuous on comparison foot radiograph.    Nonspecific dermal fluid collection along the plantar aspect of the 1st and 2nd metatarsophalangeal joints, possibly a blister.  An infected fluid collection cannot be excluded.  Suggest correlation with direct visualization and clinical findings.    Clinical Findings:  S/p  right foot I&D. Surgical site stable with serous drainage. Mild pain upon palpation right foot.

## 2018-06-14 NOTE — ASSESSMENT & PLAN NOTE
- Uncontrolled outpatient with hgb A1C of 12, 's, and anion gap 14 on admission (previously 9.7 in 2/2018). On insulin glargine 55 units, aspart 20 units TIDWM, metformin, trulicity at home.  - BG currently low to high 200s throughout the day/night.  - Increase insulin detemir to 60 units qhs.  - Increase insulin aspart to 28 units TIDWM.  - Continue SSI.  - POCT glucose AC & HS.  - Holding home metformin and trulicity while in house.

## 2018-06-14 NOTE — PROGRESS NOTES
[facility]  Podiatry  Progress Note    Patient Name: Billy Sheffield Miguel  MRN: [id number]  Admission Date: 6/1/2018  Hospital Length of Stay: 13 days  Attending Physician: Shree Arita MD  Primary Care Provider: BRAD Baker MD   Interval Hx: S/p  right foot I&D DOS:6/2/18, 6/5/18 and 6/7/18 . Reports pain improved well controlled with pain meds. Removed large piece of glass from wound however  xrays reviewed noting residual foreign body in foot. Pending application of Home wound VAC.     Scheduled Meds:   aspirin  81 mg Oral Daily    atorvastatin  80 mg Oral Daily    carvedilol  12.5 mg Oral BID    ceFAZolin (ANCEF) IVPB  2 g Intravenous Q8H    enoxaparin  40 mg Subcutaneous Daily    insulin aspart U-100  28 Units Subcutaneous TIDWM    insulin detemir U-100  60 Units Subcutaneous QHS    Lactobacillus rhamnosus GG  1 capsule Oral Daily    lisinopril  40 mg Oral Daily    magnesium oxide  400 mg Oral BID    psyllium husk (aspartame)  1 packet Oral Daily    senna-docusate 8.6-50 mg  1 tablet Oral Daily     Continuous Infusions:  PRN Meds:acetaminophen, dextrose 50%, dextrose 50%, glucagon (human recombinant), glucose, glucose, insulin aspart U-100, ondansetron, oxyCODONE, sodium chloride 0.9%, sodium chloride 0.9%, sodium chloride 0.9%    Review of patient's allergies indicates:   Allergen Reactions    Penicillins Anaphylaxis    Shellfish containing products      Other reaction(s): Unknown    Vancomycin Itching    Bactrim  [sulfamethoxazole-trimethoprim] Rash        Past Medical History:   Diagnosis Date    Allergy     CAD (coronary artery disease), native coronary artery 6/25/2013    Cancer     Diabetes mellitus     Diabetes mellitus, type 2     Disorder of kidney and ureter     Heart attack 04/2012    Hyperlipidemia     Hypertension     Muscular pain     post-op after colonoscopy     Past Surgical History:   Procedure Laterality Date    COLONOSCOPY N/A 2/17/2017     Procedure: COLONOSCOPY;  Surgeon: Simon Gomez MD;  Location: [facility] ENDO (4TH FLR);  Service: Endoscopy;  Laterality: N/A;    CORONARY ANGIOPLASTY      INCISION AND DRAINAGE FOOT Right 6/5/2018    Procedure: INCISION AND DRAINAGE, FOOT;  Surgeon: Bridget Santana DPM;  Location: [facility] OR 1ST FLR;  Service: Podiatry;  Laterality: Right;  Request mini C arm in room. request wound vac with medium black sponge    INCISION AND DRAINAGE FOOT Right 6/7/2018    Procedure: INCISION AND DRAINAGE, FOOT;  Surgeon: Emelia Brock DPM;  Location: [facility] OR 2ND FLR;  Service: Podiatry;  Laterality: Right;    INCISION AND DRAINAGE OF ABSCESS Right 6/2/2018    Procedure: INCISION AND DRAINAGE, ABSCESS;  Surgeon: Bridget Santana DPM;  Location: [facility] OR 2ND FLR;  Service: General;  Laterality: Right;    REMOVAL OF FOREIGN BODY FROM FOOT Right 6/7/2018    Procedure: REMOVAL, FOREIGN BODY, FOOT;  Surgeon: Emelia Brock DPM;  Location: [facility] OR 2ND FLR;  Service: Podiatry;  Laterality: Right;    TONSILLECTOMY         Family History     Problem Relation (Age of Onset)    Heart disease Mother, Brother        Social History Main Topics    Smoking status: Never Smoker    Smokeless tobacco: Never Used    Alcohol use Yes      Comment: rare x1 beer-     Drug use: No    Sexual activity: Not Currently     Review of Systems   Constitutional: Positive for activity change.   Respiratory: Negative for shortness of breath.    Cardiovascular: Negative for chest pain and leg swelling.   Gastrointestinal: Negative for nausea and vomiting.   Genitourinary: Negative.    Musculoskeletal: Positive for arthralgias.   Skin: Positive for wound.   Neurological: Negative for weakness and numbness.   Psychiatric/Behavioral: Negative.      Objective:     Vital Signs (Most Recent):  Temp: 97.8 °F (36.6 °C) (06/14/18 1213)  Pulse: (!) 58 (06/14/18 1500)  Resp: 18 (06/14/18 1213)  BP: (!) 107/57 (06/14/18 1213)  SpO2: 97 % (06/14/18 1213) Vital Signs (24h  Range):  Temp:  [97.8 °F (36.6 °C)-98.9 °F (37.2 °C)] 97.8 °F (36.6 °C)  Pulse:  [55-75] 58  Resp:  [18-20] 18  SpO2:  [96 %-98 %] 97 %  BP: (107-136)/(57-83) 107/57     Weight: 122.5 kg (270 lb)  Body mass index is 35.62 kg/m².    Foot Exam    General  Orientation: alert and oriented to person, place, and time       Right Foot/Ankle     Inspection and Palpation  Tenderness: metatarsals   Swelling: metatarsals   Skin Exam: ulcer;     Neurovascular  Dorsalis pedis: 1+  Posterior tibial: 1+      Left Foot/Ankle      Inspection and Palpation  Tenderness: none   Swelling: none   Skin Exam: erythema;     Neurovascular  Dorsalis pedis: 1+  Posterior tibial: 1+          6/11/18    Wound 1:Plantar right foot   Measurement: 5cmx0.2cmx0.5cm  Base: fibrous   Periwound skin: macerated tissue -   Drainage: serous  Erythema: continued erythema noted to right foot.   Probe: negatetive probe to bone.     Left second toe with dusky appearance- improved.     Wound 1: Right dorsal foot.   Measurement:2 incisions one at base of 2nd-- 0.5cmx0.5cmx0.5cm and 3rd toe. One at plantar foot-[id number] , 2nd interdigital space 1.0cmx0.5cmx0.1cm.    Base: fibrous base   Periwound skin: erythema improved.   Drainage: serous,   Erythema: moderate - improved.   Probe: none, no bone exposed    Right dorsal suture site with ischemic appearance.                               Gentian violet applied to right plantar foot.                           Laboratory:  CBC:     Recent Labs  Lab 06/14/18  0523   WBC 7.44   RBC 4.14*   HGB 12.1*   HCT 36.2*   *   MCV 87   MCH 29.2   MCHC 33.4     CMP:     Recent Labs  Lab 06/14/18  0523   *   CALCIUM 9.0   ALBUMIN 2.6*   PROT 7.1   *   K 4.7   CO2 27      BUN 16   CREATININE 0.8   ALKPHOS 88   ALT 14   AST 19   BILITOT 0.4       Diagnostic Results:  Xray: There is DJD and spurs on the calcaneus.  There is a foreign body soft tissues below in between the 1st and 2nd metatarsals thought  to be a shard of glass.  There may be soft tissue swelling.  No fracture dislocation bone destruction seen.    MRI: No MR evidence of osteomyelitis.    Diffuse subcutaneous and myofascial forefoot edema with multiple plantar skin defects, overall, concerning for cellulitis/myositis.  Associated radiopaque foreign body likely reflecting glass is more conspicuous on comparison foot radiograph.    Nonspecific dermal fluid collection along the plantar aspect of the 1st and 2nd metatarsophalangeal joints, possibly a blister.  An infected fluid collection cannot be excluded.  Suggest correlation with direct visualization and clinical findings.    Clinical Findings:  S/p  right foot I&D. Surgical site stable with serous drainage. Mild pain upon palpation right foot.     Assessment/Plan:           * Diabetic ulcer of right midfoot     -S/p right foot I&D, wound base is fibrotic/necrotic with ischemic appearance, no acute SOI noted, stable.   -S/p angio per interventional cardiology, patient with 3 vessel runoff. Appreciate assistance.   -Repeat xray right foot ordered, reviewed noting small piece <1mm  residual foreign body right foot.   -Highly recommend wound vac and [facility]X at [facility], will follow with . If wound vac is not able to be obtained, can go home with home health to change dressings 2x per week and will require follow up with podiatry once a week.   -Abx per ID  -Periwound macerated areas painted with gentian violet and wound packed with plain packing strips and hydrafera blue, dressed with abd pad, kerlix, loose ACE  -Santyl ordered  -DARCO shoe ordered  -Podiatry will follow with daily dressing changes     Weightbearing Status: Partial heel WB right   Offloading Device: DARCO shoe            Foreign body in right foot     S/p right foot I&D x3. Large piece of glass removed from foot. Residual piece of glass noted negligible in size <1mm.           Right foot infection     Per above           Type 2 diabetes  mellitus 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, with long-term current use of insulin     Per primary                Lyudmila Yu MD  Podiatry  [facility]

## 2018-06-14 NOTE — PLAN OF CARE
Problem: Diabetes, Type 2 (Adult)  Intervention: Optimize Glycemic Control   06/14/18 0424   Nutrition Interventions   Glycemic Management blood glucose monitoring;supplemental insulin given         Problem: Infection, Risk/Actual (Adult)  Intervention: Prevent Infection/Maximize Resistance   06/14/18 0424   Hygiene Care   Bathing/Skin Care other (see comments)   Nutrition Interventions   Glycemic Management blood glucose monitoring;supplemental insulin given   Oral Nutrition Promotion physical activity promoted   Respiratory Interventions   Airway/Ventilation Management airway patency maintained   Pain/Comfort/Sleep Interventions   Sleep/Rest Enhancement awakenings minimized;regular sleep/rest pattern promoted;relaxation techniques promoted     Patient alert and oriented and ambulates independently. Patient free from falls and nonskid sock given to apply to L foot.  Patient denies pain.  Blood glucose 2200  [321] and received 4 units of as part insulin before qhs snack offered and accepted .   Blood glucose decreased to 273.   Patient R foot with a ace wrapped and changed per podiatry.  Patient educated on consuming whole milk and changing milk to 2% fat free milk , since patient has a hx of CVA , HLD, and obesity   Discharge date is TBD, family support is his wife.

## 2018-06-14 NOTE — SUBJECTIVE & OBJECTIVE
Interval History: Afebrile, no acute overnight events. Patient unable to afford wound vac and IV abx costs at home and insurance denied LTAC. Patient wanting to appeal decision for LTAC, however, SW also providing SNF options.    Review of Systems  Objective:     Vital Signs (Most Recent):  Temp: 97.8 °F (36.6 °C) (06/14/18 1213)  Pulse: (!) 59 (06/14/18 1213)  Resp: 18 (06/14/18 1213)  BP: (!) 107/57 (06/14/18 1213)  SpO2: 97 % (06/14/18 1213) Vital Signs (24h Range):  Temp:  [97.8 °F (36.6 °C)-98.9 °F (37.2 °C)] 97.8 °F (36.6 °C)  Pulse:  [55-75] 59  Resp:  [18-20] 18  SpO2:  [96 %-98 %] 97 %  BP: (107-136)/(57-83) 107/57     Weight: 122.5 kg (270 lb)  Body mass index is 35.62 kg/m².    Intake/Output Summary (Last 24 hours) at 06/14/18 1301  Last data filed at 06/14/18 1051   Gross per 24 hour   Intake             1640 ml   Output             1640 ml   Net                0 ml      Physical Exam   Constitutional: He is oriented to person, place, and time. He appears well-developed and well-nourished. No distress.   HENT:   Head: Normocephalic and atraumatic.   Eyes: Conjunctivae, EOM and lids are normal.   Neck: No tracheal deviation present. No thyromegaly present.   Cardiovascular: Normal rate, regular rhythm, normal heart sounds and intact distal pulses.    No murmur heard.  Pulmonary/Chest: Effort normal and breath sounds normal. No respiratory distress. He has no wheezes. He has no rales.   Abdominal: Soft. He exhibits no distension. There is no tenderness.   Musculoskeletal: He exhibits no edema or tenderness.   Right foot bandage, intact, clean, dry. R second toe discoloration improving.   Neurological: He is alert and oriented to person, place, and time. No cranial nerve deficit. He exhibits normal muscle tone.   Skin: Skin is warm and dry. He is not diaphoretic. There is erythema (mild, improving).   RLE erythema improving. Non tender, non edematous.   Psychiatric: He has a normal mood and affect.   Vitals  reviewed.      Significant Labs:   CBC:   Recent Labs  Lab 06/13/18  0533 06/14/18  0523   WBC 8.17 7.44   HGB 12.1* 12.1*   HCT 36.8* 36.2*   * 359*     CMP:   Recent Labs  Lab 06/13/18  0533 06/14/18  0523   * 135*   K 4.4 4.7    100   CO2 25 27   * 272*   BUN 15 16   CREATININE 0.9 0.8   CALCIUM 8.8 9.0   PROT 7.1 7.1   ALBUMIN 2.5* 2.6*   BILITOT 0.4 0.4   ALKPHOS 92 88   AST 20 19   ALT 14 14   ANIONGAP 9 8   EGFRNONAA >60.0 >60.0       Significant Imaging: I have reviewed all pertinent imaging results/findings within the past 24 hours.

## 2018-06-15 VITALS
DIASTOLIC BLOOD PRESSURE: 66 MMHG | SYSTOLIC BLOOD PRESSURE: 110 MMHG | HEART RATE: 61 BPM | RESPIRATION RATE: 18 BRPM | WEIGHT: 270 LBS | HEIGHT: 73 IN | OXYGEN SATURATION: 97 % | TEMPERATURE: 97 F | BODY MASS INDEX: 35.78 KG/M2

## 2018-06-15 PROBLEM — T81.89XA DELAYED SURGICAL WOUND HEALING: Status: ACTIVE | Noted: 2018-06-15

## 2018-06-15 PROBLEM — E11.9 DIABETES MELLITUS: Status: ACTIVE | Noted: 2018-06-15

## 2018-06-15 PROBLEM — E87.1 HYPONATREMIA: Status: ACTIVE | Noted: 2018-06-15

## 2018-06-15 LAB
ALBUMIN SERPL BCP-MCNC: 2.6 G/DL
ALP SERPL-CCNC: 86 U/L
ALT SERPL W/O P-5'-P-CCNC: 14 U/L
ANION GAP SERPL CALC-SCNC: 7 MMOL/L
AST SERPL-CCNC: 22 U/L
BASOPHILS # BLD AUTO: 0.05 K/UL
BASOPHILS NFR BLD: 0.7 %
BILIRUB SERPL-MCNC: 0.4 MG/DL
BUN SERPL-MCNC: 17 MG/DL
CALCIUM SERPL-MCNC: 9 MG/DL
CHLORIDE SERPL-SCNC: 99 MMOL/L
CO2 SERPL-SCNC: 28 MMOL/L
CREAT SERPL-MCNC: 0.9 MG/DL
DIFFERENTIAL METHOD: ABNORMAL
EOSINOPHIL # BLD AUTO: 0.3 K/UL
EOSINOPHIL NFR BLD: 3.5 %
ERYTHROCYTE [DISTWIDTH] IN BLOOD BY AUTOMATED COUNT: 12.3 %
EST. GFR  (AFRICAN AMERICAN): >60 ML/MIN/1.73 M^2
EST. GFR  (NON AFRICAN AMERICAN): >60 ML/MIN/1.73 M^2
GLUCOSE SERPL-MCNC: 240 MG/DL
HCT VFR BLD AUTO: 35.8 %
HGB BLD-MCNC: 11.7 G/DL
IMM GRANULOCYTES # BLD AUTO: 0.09 K/UL
IMM GRANULOCYTES NFR BLD AUTO: 1.2 %
LYMPHOCYTES # BLD AUTO: 1.9 K/UL
LYMPHOCYTES NFR BLD: 24.2 %
MAGNESIUM SERPL-MCNC: 1.7 MG/DL
MCH RBC QN AUTO: 29 PG
MCHC RBC AUTO-ENTMCNC: 32.7 G/DL
MCV RBC AUTO: 89 FL
MONOCYTES # BLD AUTO: 0.8 K/UL
MONOCYTES NFR BLD: 10 %
NEUTROPHILS # BLD AUTO: 4.7 K/UL
NEUTROPHILS NFR BLD: 60.4 %
NRBC BLD-RTO: 0 /100 WBC
PHOSPHATE SERPL-MCNC: 4 MG/DL
PLATELET # BLD AUTO: 373 K/UL
PMV BLD AUTO: 8.9 FL
POCT GLUCOSE: 219 MG/DL (ref 70–110)
POCT GLUCOSE: 244 MG/DL (ref 70–110)
POCT GLUCOSE: 324 MG/DL (ref 70–110)
POTASSIUM SERPL-SCNC: 4.6 MMOL/L
PROT SERPL-MCNC: 7 G/DL
RBC # BLD AUTO: 4.04 M/UL
SODIUM SERPL-SCNC: 134 MMOL/L
WBC # BLD AUTO: 7.69 K/UL

## 2018-06-15 PROCEDURE — 83735 ASSAY OF MAGNESIUM: CPT

## 2018-06-15 PROCEDURE — 85025 COMPLETE CBC W/AUTO DIFF WBC: CPT

## 2018-06-15 PROCEDURE — 80053 COMPREHEN METABOLIC PANEL: CPT

## 2018-06-15 PROCEDURE — 25000003 PHARM REV CODE 250: Performed by: STUDENT IN AN ORGANIZED HEALTH CARE EDUCATION/TRAINING PROGRAM

## 2018-06-15 PROCEDURE — 99024 POSTOP FOLLOW-UP VISIT: CPT | Mod: ,,, | Performed by: PODIATRIST

## 2018-06-15 PROCEDURE — 63600175 PHARM REV CODE 636 W HCPCS: Performed by: STUDENT IN AN ORGANIZED HEALTH CARE EDUCATION/TRAINING PROGRAM

## 2018-06-15 PROCEDURE — 99238 HOSP IP/OBS DSCHRG MGMT 30/<: CPT | Mod: ,,, | Performed by: HOSPITALIST

## 2018-06-15 PROCEDURE — 63600175 PHARM REV CODE 636 W HCPCS: Performed by: PHYSICIAN ASSISTANT

## 2018-06-15 PROCEDURE — 84100 ASSAY OF PHOSPHORUS: CPT

## 2018-06-15 RX ORDER — AMOXICILLIN 250 MG
1 CAPSULE ORAL DAILY
Start: 2018-06-15 | End: 2019-08-06

## 2018-06-15 RX ORDER — CARVEDILOL 12.5 MG/1
12.5 TABLET ORAL 2 TIMES DAILY
Qty: 60 TABLET | Refills: 11 | Status: ON HOLD
Start: 2018-06-15 | End: 2018-07-19 | Stop reason: HOSPADM

## 2018-06-15 RX ORDER — LANOLIN ALCOHOL/MO/W.PET/CERES
400 CREAM (GRAM) TOPICAL 2 TIMES DAILY
Refills: 0
Start: 2018-06-15 | End: 2019-08-06

## 2018-06-15 RX ORDER — INSULIN GLARGINE 100 [IU]/ML
INJECTION, SOLUTION SUBCUTANEOUS
Qty: 2 BOX | Refills: 3
Start: 2018-06-15 | End: 2019-08-06

## 2018-06-15 RX ORDER — INSULIN ASPART 100 [IU]/ML
30 INJECTION, SOLUTION INTRAVENOUS; SUBCUTANEOUS
Status: DISCONTINUED | OUTPATIENT
Start: 2018-06-15 | End: 2018-06-15 | Stop reason: HOSPADM

## 2018-06-15 RX ORDER — INSULIN LISPRO 100 [IU]/ML
INJECTION, SOLUTION INTRAVENOUS; SUBCUTANEOUS
Qty: 18 ML | Refills: 3
Start: 2018-06-15 | End: 2019-06-07

## 2018-06-15 RX ADMIN — PSYLLIUM HUSK 1 PACKET: 3.4 POWDER ORAL at 09:06

## 2018-06-15 RX ADMIN — INSULIN ASPART 28 UNITS: 100 INJECTION, SOLUTION INTRAVENOUS; SUBCUTANEOUS at 10:06

## 2018-06-15 RX ADMIN — INSULIN ASPART 30 UNITS: 100 INJECTION, SOLUTION INTRAVENOUS; SUBCUTANEOUS at 12:06

## 2018-06-15 RX ADMIN — INSULIN ASPART 8 UNITS: 100 INJECTION, SOLUTION INTRAVENOUS; SUBCUTANEOUS at 12:06

## 2018-06-15 RX ADMIN — LISINOPRIL 40 MG: 20 TABLET ORAL at 09:06

## 2018-06-15 RX ADMIN — Medication 1 CAPSULE: at 09:06

## 2018-06-15 RX ADMIN — ASPIRIN 81 MG: 81 TABLET, COATED ORAL at 09:06

## 2018-06-15 RX ADMIN — ATORVASTATIN CALCIUM 80 MG: 20 TABLET, FILM COATED ORAL at 09:06

## 2018-06-15 RX ADMIN — CARVEDILOL 12.5 MG: 12.5 TABLET, FILM COATED ORAL at 09:06

## 2018-06-15 RX ADMIN — Medication 400 MG: at 09:06

## 2018-06-15 RX ADMIN — CEFAZOLIN 2 G: 1 INJECTION, POWDER, FOR SOLUTION INTRAMUSCULAR; INTRAVENOUS at 05:06

## 2018-06-15 RX ADMIN — COLLAGENASE SANTYL: 250 OINTMENT TOPICAL at 10:06

## 2018-06-15 RX ADMIN — STANDARDIZED SENNA CONCENTRATE AND DOCUSATE SODIUM 1 TABLET: 8.6; 5 TABLET, FILM COATED ORAL at 09:06

## 2018-06-15 NOTE — NURSING
Transport at bedside. PICC remain in place to RUE. Dsg to PICC CDI. Pt denies c/o. No distress noted. Assisted to wheelchair. Escorted from floor via wheelchair by SPD

## 2018-06-15 NOTE — DISCHARGE SUMMARY
[facility] Hospital Medicine  Discharge Summary      Patient Name: Billy Sheffield Miguel  MRN: [id number]  Admission Date: 6/1/2018  Hospital Length of Stay: 14 days  Discharge Date and Time:  06/15/2018 3:57 PM  Attending Physician: No att. providers found   Discharging Provider: Millicent Torre MD  Primary Care Provider: BRAD Baker MD  [facility] Medicine Team: [facility] HOSP MED 2 Millicent Torre MD    HPI:   Patient is a 52-year-old male with HTN, MI 2013 s/p 1 stent, DMT2, colon cancer s/p resection 2017 who presents after failing outpatient treatment for a right ulcer.  Patient states that he stepped on a piece of glass at work where he is  on the 19th of May and it went through his shoe. At that time he removed the glass, used antiseptic and antibacterial on his foot and felt that things are going OK. He didn't have any issues until last Saturday The 26th, when he noted that a puffy cherry blister was occurring on his plantar surface near the first digit. On Tuesday, the 29th he presented to his primary care physician in [place] where he was started on clinda and cipro. Patient notes that the clinda and Cipro gave him diarrhea and vomiting. He stated that he had diarrhea three times a day with some form, mostly liquid, no blood and it has been the same since it started. His vomiting occurred five times yesterday, and was brownish with food. His last diarrhea episode was at 10:30 this morning, and vomiting at 4:30 in the afternoon. The patient presented to his outside PCP today, without relief of symptoms on antibiotics. Patient denies fever, chills, nausea, vomiting, diarrhea, dysuria, rash, shortness of breath, chest pain, abdominal pain.  Patient states that his diabetes started 15 to 20 years ago, and has been reasonably well controlled since then with no neuropathy, optic issues or kidney issues. Patient notes that he had a stent in 2013 for coronary artery disease, and colon  cancer that was resected in April 2017 without any issues on follow up.    Procedure(s) (LRB):  INCISION AND DRAINAGE, FOOT (Right)  REMOVAL, FOREIGN BODY, FOOT (Right)      Hospital Course:   6/1/2018- admission date. Empirically started on daptomycin due to PCN allergy, aztreonam, flagyl for anaerobe coverage. Diarrhea resolved on admission, thought to be side effect of outpatient abx rather than c diff.  6/2/2018- Received OR debridement by podiatry. Tolerated procedure well. Had fever of 101.9 overnight. WBC persistently ~13. VASILIY 1.14 in LLE. RLE VASILIY unable to be obtained due to wound/infection.  6/3/2018- Aerobic wound culture grew strep agalactiae. Per ID recs, abx switched to cefazolin with no adverse effects. Fever of 101.4. Repeat XR of foot showed residual piece of glass in foot.  06/04/2018: Had not had any BM since admission. Was started on bowel regimen. However, pt start having diarrhea again, 4 episodes during the day, before receiving any bowel regimen. Lactulose and miralax were not given. Pt received 1 dose of senna-docusate overnight. Had 1 more episode of diarrhea overnight. Persistent hyponatremia with Na+ in low 130s. Urine studies showed low-normal urine sodium and normal osmolality. Fever of 101 recurred for 8 hours, resolved with tylenol. Pt denied symptoms during febrile episode. WBC slightly improved, left shift persisted. Thought to be due to residual foreign object embedded in foot.  06/05/2018: Recurrence of diarrhea thought to be secondary to antibiotics, given similar presentation while pt was previously on PO abx prior to admission. Started on probiotics. Stool became formed later in the day. Went to OR again for removal of remaining piece of glass in foot. Found to have purulent drainage from wound, which was debrided. However, glass was found to be too deeply embedded in tissue and was unable to be retrieved despite multiple attempts. Post op XR of foot showed soft tissue swelling  and gas. Immediately post op, pt's fever recurred with temp up to 102 for 8 hours overnight.  6/6/2018: reports fever and fatigue. Denies chills, myalgia, cough, sore throat, dysuria, WBC stable at 13. Denies foot pain or bleeding. Hgb stable. Right second toe noted to be blue/pale on exam. Thought to be bruising from pressure applied on toe in OR during debridement of foot vs ischemia (given unknown RLE VASILIY). Monitored for swelling, per podiatry recs.  06/07/2018: had fever of 101.1 F overnight that resolved with 1 dose of PO tylenol. VS otherwise stable. Pain controlled with oxycodone IR 5 mg x 1. WBC improved to 10. No BM since I/D two days ago. Went to OR again for I/D in attempt to remove residual piece of glass in wound, which successfully removed large piece of glass. Wound cultures were sent. Unfortunately, there was one small piece (<1 mm in size) residual in wound, which was confirmed on XR post op. Underwent BLE arterial angiogram for ischemic evaluation which showed 3 vessel run off.  06/08/2018: remained afebrile. WBC remained normal. Per ID recs, continued on IV abx for 6 week duration for presumed osteomyelitis, given proximity of foreign object/extent of wound to bone. PICC line was placed, confirmed by CXR. No BM since 6/5/18. Started on metamucil.  06/09/2018: remained afebrile with normal WBC. Blood cultures from 6/2/18 and 6/6/18 remained negative. Pain controlled with prn opiates. Started on senna-doc.  06/10/2018: remained afebrile with Tmax 100 F. HR and BP at goal, WBC wnl. Hgb remained stable, no signs of bleeding post op. Pain controlled, independently ambulated from bed to bathroom on heel of R foot, able to bear some weight over site of wound. R foot bandage changed daily. R second toe bruising improving. R leg cellulitis resolving. Had 1 formed stool yesterday and 1 this morning. Refused metamucil and senna-doc. BG in 140s-220s. Insulin regimen was increased.  06/11/2018: remained  afebrile with Tm 98.9 F. Pain controlled, did not need any prn opiates for the past 3 days. No signs/sx of bleeding from wound. Resumed on ASA 81 mg daily. Continued to have normal BM without taking metamucil and senna-doc. BG at lunch time, dinner time, bed time, and overnight improved to 180-200 on higher dose of insulin. Recurrent hypomagnesemia, corrected with IV supplement.   06/12/2018: Remained medically stable. Ambulating independently without significant pain. Having daily BM. Hyperglycemia controlled with increased insulin regimen.  06/13/2018: Remained medically stable. BG up to 210s-280s throughout the day and night. Insulin detemir was increased to 60 units qhs and insulin aspart was increased to 25 units TIDWM.  06/14/2018: BG persistently high. Insulin aspart was increased to 28 units TIDWM.  06/15/2018: BG persistently in mid-high 200s. Insulin aspart was increased to 30 units TIDWM. Due to cost of wound vac and IV abx, pt was unable to be discharged home. Instead, discharged to LTAC with 6 week total course of cefazolin (via HH), wound vac, new insulin regimen, probiotics, bowel regimen, PO Mg+2 oxide supplement. Plan to follow up with podiatry, ID, and allergy (for evaluation of penicillin allergy).     Consults:   Consults         Status Ordering Provider     Inpatient consult to Infectious Diseases  Once     Provider:  (Not yet assigned)    Completed JARAD MORAN     Inpatient consult to Interventional Cardiology  Once     Provider:  (Not yet assigned)    Completed JARAD MORAN     Inpatient consult to PICC team ([facility])  Once     Provider:  (Not yet assigned)    Completed AZIAZ CHAN     Inpatient consult to Podiatry  Once     Provider:  (Not yet assigned)    Completed REID [id number]        Significant Diagnostic Studies:  Labs:  Micro:  Procedure Component Value Units Date/Time   Aerobic culture [[id number]] Collected: 06/08/18 9259   Order Status: Completed Specimen: Wound from Foot,  Right Updated: 06/10/18 0834    Aerobic Bacterial Culture --    STREPTOCOCCUS AGALACTIAE (GROUP B)   Few   Beta-hemolytic streptococci are routinely susceptible to   penicillins,cephalosporins and carbapenems.    Culture, Anaerobe [[id number]] Collected: 06/08/18 1732   Order Status: Completed Specimen: Wound from Foot, Right Updated: 06/12/18 0928    Anaerobic Culture No anaerobes isolated   Blood culture [[id number]] Collected: 06/06/18 0950   Order Status: Completed Specimen: Blood Updated: 06/11/18 1412    Blood Culture, Routine No growth after 5 days.   Clostridium difficile EIA [[id number]]    Order Status: Canceled Specimen: Stool from Stool    Blood Culture #1 **CANNOT BE ORDERED STAT** [[id number]] Collected: 06/02/18 2115   Order Status: Completed Specimen: Blood Updated: 06/07/18 2312    Blood Culture, Routine No growth after 5 days.   Blood Culture #1 **CANNOT BE ORDERED STAT** [[id number]] Collected: 06/02/18 2111   Order Status: Completed Specimen: Blood Updated: 06/07/18 2312    Blood Culture, Routine No growth after 5 days.   Fungus culture [[id number]] Collected: 06/02/18 1226   Order Status: Completed Specimen: Abscess from Foot, Right Updated: 06/06/18 1006    Fungus (Mycology) Culture Culture in progress   Narrative:     Right Foot   AFB Culture & Smear [[id number]] Collected: 06/02/18 1226   Order Status: Completed Specimen: Abscess from Foot, Right Updated: 06/04/18 1455    AFB Culture & Smear Culture in progress    AFB CULTURE STAIN No acid fast bacilli seen.   Narrative:     Right Foot   Culture, Anaerobe [[id number]] Collected: 06/02/18 1226   Order Status: Completed Specimen: Abscess from Foot, Right Updated: 06/07/18 1228    Anaerobic Culture No anaerobes isolated   Narrative:     Right Foot   Aerobic culture [[id number]] Collected: 06/02/18 1226   Order Status: Completed Specimen: Abscess from Foot, Right Updated: 06/04/18 1230    Aerobic Bacterial Culture --    STREPTOCOCCUS AGALACTIAE (GROUP B)    Many   Beta-hemolytic streptococci are routinely susceptible to   penicillins,cephalosporins and carbapenems.   Susceptibility testing not routinely performed    Narrative:     Right Foot   Gram stain [[id number]] Collected: 06/02/18 1226   Order Status: Completed Specimen: Abscess from Foot, Right Updated: 06/02/18 1309    Gram Stain Result Rare WBC's     Rare Gram positive cocci   Narrative:     Right Foot   Fungus culture [[id number]] Collected: 06/02/18 1218   Order Status: Completed Specimen: Abscess from Foot, Right Updated: 06/06/18 1006    Fungus (Mycology) Culture Culture in progress   Narrative:     Right Foot   AFB Culture & Smear [[id number]] Collected: 06/02/18 1218   Order Status: Completed Specimen: Abscess from Foot, Right Updated: 06/04/18 1455    AFB Culture & Smear Culture in progress    AFB CULTURE STAIN No acid fast bacilli seen.   Narrative:     Right Foot   Culture, Anaerobe [[id number]] Collected: 06/02/18 1218   Order Status: Completed Specimen: Abscess from Foot, Right Updated: 06/07/18 1227    Anaerobic Culture No anaerobes isolated   Narrative:     Right Foot   Aerobic culture [[id number]] Collected: 06/02/18 1218   Order Status: Completed Specimen: Abscess from Foot, Right Updated: 06/04/18 1218    Aerobic Bacterial Culture --    STREPTOCOCCUS AGALACTIAE (GROUP B)   Many   Beta-hemolytic streptococci are routinely susceptible to   penicillins,cephalosporins and carbapenems.   Susceptibility testing not routinely performed    Narrative:     Right Foot   Gram stain [[id number]] Collected: 06/02/18 1218   Order Status: Completed Specimen: Abscess from Foot, Right Updated: 06/02/18 1308    Gram Stain Result Rare WBC's     Rare Gram positive cocci   Narrative:     Right Foot   Clostridium difficile EIA [[id number]]    Order Status: Canceled Specimen: Stool from Stool    Blood Culture #1 **CANNOT BE ORDERED STAT** [[id number]] Collected: 06/01/18 9805   Order Status: Completed Specimen: Blood from  Peripheral, Hand, Right Updated: 06/06/18 1812    Blood Culture, Routine No growth after 5 days.     CBC:   6/1/2018 14:45 6/2/2018 00:25 6/2/2018 05:06 6/3/2018 04:43 6/4/2018 05:08 6/5/2018 04:04 6/6/2018 06:24 6/7/2018 03:39 6/8/2018 05:21 6/9/2018 04:13 6/10/2018 05:00 6/11/2018 05:40 6/12/2018 04:18 6/13/2018 05:33 6/14/2018 05:23 6/15/2018 04:47   WBC 15.66 (H) 13.51 (H) 13.76 (H) 13.45 (H) 13.18 (H) 12.35 13.16 (H) 10.51 10.37 9.05 9.53 10.20 9.52 8.17 7.44 7.69   RBC 4.89 4.22 (L) 4.47 (L) 4.05 (L) 4.15 (L) 4.03 (L) 4.10 (L) 4.00 (L) 3.86 (L) 3.86 (L) 3.93 (L) 4.21 (L) 4.12 (L) 4.17 (L) 4.14 (L) 4.04 (L)   Hemoglobin 14.4 12.4 (L) 13.3 (L) 11.9 (L) 12.2 (L) 11.8 (L) 11.8 (L) 11.7 (L) 11.3 (L) 11.2 (L) 11.3 (L) 12.5 (L) 12.2 (L) 12.1 (L) 12.1 (L) 11.7 (L)   Hematocrit 41.9 36.5 (L) 38.6 (L) 35.2 (L) 35.7 (L) 34.9 (L) 36.0 (L) 35.3 (L) 34.1 (L) 33.6 (L) 34.5 (L) 36.8 (L) 36.1 (L) 36.8 (L) 36.2 (L) 35.8 (L)   MCV 86 87 86 87 86 87 88 88 88 87 88 87 88 88 87 89   MCH 29.4 29.4 29.8 29.4 29.4 29.3 28.8 29.3 29.3 29.0 28.8 29.7 29.6 29.0 29.2 29.0   MCHC 34.4 34.0 34.5 33.8 34.2 33.8 32.8 33.1 33.1 33.3 32.8 34.0 33.8 32.9 33.4 32.7   RDW 12.1 12.0 12.1 11.9 11.9 12.0 12.0 12.0 12.2 12.1 11.9 12.1 12.1 12.1 12.2 12.3   Platelets 263 245 252 259 284 272 274 257 272 284 322 367 (H) 375 (H) 373 (H) 359 (H) 373 (H)   MPV 10.3 9.3 9.7 9.2 9.3 9.2 9.8 9.6 9.7 9.5 9.4 9.5 8.9 (L) 8.7 (L) 8.9 (L) 8.9 (L)   Gran% 80.9 (H) 76.3 (H) 76.8 (H) 75.9 (H) 75.9 (H) 73.6 (H) 74.8 (H) 68.3 73.5 (H) 67.6 68.1 66.6 66.0 63.1 60.3 60.4   Gran # (ANC) 12.7 (H) 10.3 (H) 10.6 (H) 10.2 (H) 10.0 (H) 9.1 (H) 9.8 (H) 7.2 7.6 6.1 6.5 6.8 6.3 5.2 4.5 4.7   Immature Granulocytes 0.8 (H) 0.6 (H) 0.8 (H) 0.9 (H) 1.2 (H) 1.7 (H) 1.7 (H) 2.1 (H) 1.7 (H) 2.3 (H) 2.9 (H) 2.6 (H) 2.0 (H) 1.7 (H) 1.1 (H) 1.2 (H)   Immature Grans (Abs) 0.12 (H) 0.08 (H) 0.11 (H) 0.12 (H) 0.16 (H) 0.21 (H) 0.22 (H) 0.22 (H) 0.18 (H) 0.21 (H) 0.28 (H) 0.27 (H) 0.19 (H)  0.14 (H) 0.08 (H) 0.09 (H)   Lymph% 6.5 (L) 8.7 (L) 7.9 (L) 9.4 (L) 8.9 (L) 11.0 (L) 9.2 (L) 14.5 (L) 11.9 (L) 15.9 (L) 13.5 (L) 17.5 (L) 19.2 20.0 23.9 24.2   Lymph # 1.0 1.2 1.1 1.3 1.2 1.4 1.2 1.5 1.2 1.4 1.3 1.8 1.8 1.6 1.8 1.9   Mono% 11.2 13.8 13.5 13.2 12.5 12.1 12.9 12.1 10.3 11.3 12.2 10.1 9.7 11.4 10.5 10.0   Mono # 1.8 (H) 1.9 (H) 1.9 (H) 1.8 (H) 1.7 (H) 1.5 (H) 1.7 (H) 1.3 (H) 1.1 (H) 1.0 1.2 (H) 1.0 0.9 0.9 0.8 0.8   Eosinophil% 0.3 0.4 0.6 0.3 1.1 1.3 1.1 2.6 2.3 2.5 2.8 2.6 2.7 3.2 3.5 3.5   Eos # 0.0 0.1 0.1 0.0 0.1 0.2 0.2 0.3 0.2 0.2 0.3 0.3 0.3 0.3 0.3 0.3   Basophil% 0.3 0.2 0.4 0.3 0.4 0.3 0.3 0.4 0.3 0.4 0.5 0.6 0.4 0.6 0.7 0.7   Baso # 0.04 0.03 0.06 0.04 0.05 0.04 0.04 0.04 0.03 0.04 0.05 0.06 0.04 0.05 0.05 0.05   nRBC 0 0 0 0 0 0 0 0 0 0 0 0 0 0 0 0     Imaging:  CXR 6/9/18:  Right PICC is present with tip at SVC.  Heart size is normal.  Lungs are expanded and clear.  No lung consolidation or pleural fluid is identified.  Skeletal structures are intact.    XR right foot 6/7/18:  Soft tissue swelling as well as lucencies consistent with local soft tissue gas collection are observed dorsally at the metatarsal level and along the plantar aspect of the foot at the metatarsophalangeal joint level.  Reference to the electronic medical record indicates that a surgical incision/drainage procedure was performed in these regions on 06/07/18.  The visualized osseous structures appear intact, with no evidence of recent fracture and with no focal lytic destructive process to specifically suggest osteomyelitis by conventional radiographic criteria noted.  There is a tiny punctate radiopaque soft tissue foreign body in the plantar soft tissues adjacent to the 2nd metatarsal shaft.    XR right foot 6/5/18:  Soft tissue swelling and some gas in the soft tissues about the metatarsal and proximal phalangeal level.  This has increased compared to prior.  History states status post I and D though  radiographically a gas-forming infection not excluded.    XR right foot 6/3/18:  There is continued edema about the dorsal aspect of the foot, although has decreased since the previous examination.  A roughly linear radiopaque foreign body remains in the interspace between the 1st and 2nd metatarsals, grossly stable in position.  No new fracture or dislocation.  There is vascular calcification.  Degenerative changes are noted about the calcaneus.  There appears to be remote injury versus irregular ossicle fusion involving the base of the 5th metatarsal, correlation advised.    CXR 6/2/18:  Coarse interstitial attenuation, likely accentuated by shallow inspiratory effort, edema felt less likely.  No large focal consolidation.    US arterial lower extremities 6/2/18:  -Ankle-brachial index of 1.14 on the left.  VASILIY on the right not obtained secondary to patient's lower extremity cellulitis/wound.  -No hemodynamically significant stenosis demonstrated in the right or left lower extremity arterial system.    XR right foot 6/2/18:  -Interval operative change with presumed subcutaneous emphysema and overlying bandage overlying the plantar soft tissues at the level of the metatarsal heads and proximal phalanges with reduced soft tissue swelling at this level from prior.  -There is persistent linear radiopacity within the plantar soft tissues at the level of the distal 3rd of the metatarsals overlying the 2nd and 3rd metatarsal shafts on the frontal views remains concerning for retained foreign body.  Clinical correlation advised.  There is no evidence for acute fracture line or dislocation of the right foot.  Calcaneal spurs again identified.    MRI right foot 6/1/18:  -No MR evidence of osteomyelitis.  -Diffuse subcutaneous and myofascial forefoot edema with multiple plantar skin defects, overall, concerning for cellulitis/myositis.  Associated radiopaque foreign body likely reflecting glass is more conspicuous on  comparison foot radiograph.  -Nonspecific dermal fluid collection along the plantar aspect of the 1st and 2nd metatarsophalangeal joints, possibly a blister.  An infected fluid collection cannot be excluded.  Suggest correlation with direct visualization and clinical findings.    XR right foot 6/1/18:  There is DJD and spurs on the calcaneus.  There is a foreign body soft tissues below in between the 1st and 2nd metatarsals thought to be a shard of glass.  There may be soft tissue swelling.  No fracture dislocation bone destruction seen.    XR right ankle 6/1/18:  There is DJD and spurs on the calcaneus.  No fracture dislocation bone destruction seen.    XR right tibia/fibula 6/1/18:  No fracture dislocation bone destruction seen.  There are spurs on the patella and tibial tuberosity.    Pending Diagnostic Studies:     None         No new Assessment & Plan notes have been filed under this hospital service since the last note was generated.  Service: Hospital Medicine    Final Active Diagnoses:    Diagnosis Date Noted POA    PRINCIPAL PROBLEM:  Diabetic ulcer of right midfoot [E11.621, L97.419] 06/01/2018 Yes    Type 2 diabetes mellitus 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, with long-term current use of insulin [E11.3393, Z79.4] 07/05/2017 Not Applicable    HTN (hypertension) [I10] 09/27/2012 Yes    Foreign body in right foot [S90.851A] 06/08/2018 Yes    Right foot infection [L08.9] 06/03/2018 Yes    CAD s/p PCI of LAD (SHELBY) in May 2013 [I25.10] 06/25/2013 Yes    Dyslipidemia [E78.5] 05/22/2013 Yes      Problems Resolved During this Admission:    Diagnosis Date Noted Date Resolved POA    Fever [R50.9] 06/04/2018 06/09/2018 Unknown    Leukocytosis [D72.829] 06/01/2018 06/09/2018 Yes    Diarrhea [R19.7] 06/04/2018 06/11/2018 Unknown    Colon cancer [C18.9] 04/05/2017 06/04/2018 Yes     Discharged Condition: stable    Disposition: Long Term Care    Follow  Up:  Follow-up Information     [facility] INFECTIOUS DISEASE In 1 week.    Specialty:  Infectious Diseases  Why:  Hospital follow up for group B strep wound infection  Contact information:  [facility]  [facility] [address]  [phone]           [facility] PODIATRY In 3 days.    Specialty:  Podiatry  Why:  Hospital follow up for multiple debridement of glass from right foot wound  Contact information:  [address] [facility] [address]  [phone]           [facility] ALLERGY.    Specialty:  Allergy  Why:  Hospital follow up for evaluation of penicillin allergy  Contact information:  [address] [facility] [address]  [phone]               Patient Instructions:     Ambulatory Referral to Podiatry   Referral Priority: Routine Referral Type: Consultation   Referral Reason: Specialty Services Required    Requested Specialty: Podiatry    Number of Visits Requested: 1      Ambulatory Referral to Infectious Disease   Referral Priority: Routine Referral Type: Consultation   Referral Reason: Specialty Services Required    Requested Specialty: Infectious Diseases    Number of Visits Requested: 1      Ambulatory Referral to Allergy   Referral Priority: Routine Referral Type: Allergy Testing   Referral Reason: Specialty Services Required    Requested Specialty: Allergy    Number of Visits Requested: 1      Activity as tolerated     Notify your health care provider if you experience any of the following:  temperature >100.4     Notify your health care provider if you experience any of the following:  persistent nausea and vomiting or diarrhea     Notify your health care provider if you experience any of the following:  severe uncontrolled pain     Notify your health care provider if you experience any of the following:  redness, tenderness, or signs of infection (pain, swelling, redness, odor or green/yellow discharge around incision site)     Notify your health care provider if you  experience any of the following:  difficulty breathing or increased cough     Notify your health care provider if you experience any of the following:  severe persistent headache     Notify your health care provider if you experience any of the following:  worsening rash     Notify your health care provider if you experience any of the following:  persistent dizziness, light-headedness, or visual disturbances     Notify your health care provider if you experience any of the following:  increased confusion or weakness       Medications:  Reconciled Home Medications:      Medication List      START taking these medications    aspirin 81 MG EC tablet  Commonly known as:  ECOTRIN  Take 1 tablet (81 mg total) by mouth once daily.  Replaces:  aspirin 81 MG Chew     ceFAZolin 1 gram injection  Commonly known as:  ANCEF  Inject 2,000 mg (2 g total) into the vein every 8 (eight) hours. End date 7/14/18     collagenase ointment  Apply topically once daily.  Start taking on:  6/16/2018     Lactobacillus rhamnosus GG 10 billion cell capsule  Commonly known as:  CULTURELLE  Take 1 capsule by mouth once daily.     magnesium oxide 400 mg tablet  Commonly known as:  MAG-OX  Take 1 tablet (400 mg total) by mouth 2 (two) times daily.     psyllium husk (aspartame) 3.4 gram Pwpk packet  Commonly known as:  METAMUCIL  Take 1 packet by mouth once daily.     senna-docusate 8.6-50 mg 8.6-50 mg per tablet  Commonly known as:  PERICOLACE  Take 1 tablet by mouth once daily.        CHANGE how you take these medications    carvedilol 12.5 MG tablet  Commonly known as:  COREG  Take 1 tablet (12.5 mg total) by mouth 2 (two) times daily.  What changed:  when to take this     insulin glargine 100 unit/mL (3 mL) Inpn pen  Commonly known as:  LANTUS SOLOSTAR U-100 INSULIN  INJECT 60 UNITS SUBCUTANEOUSLY IN THE EVENING  What changed:  additional instructions     insulin lispro 100 unit/mL injection  Commonly known as:  HUMALOG  Inject 30 units before  "every meal.  What changed:  · how much to take  · how to take this  · when to take this  · additional instructions     pen needle, diabetic 31 gauge x 3/16" Ndle  Inject 1 each into the skin 3 (three) times daily before meals.  What changed:  when to take this        CONTINUE taking these medications    atorvastatin 80 MG tablet  Commonly known as:  LIPITOR  Take 1 tablet (80 mg total) by mouth once daily.     blood sugar diagnostic Strp  Strips and lancets  Use before meals and bedtime     ibuprofen 800 MG tablet  Commonly known as:  ADVIL,MOTRIN  Take 1 tablet (800 mg total) by mouth 3 (three) times daily as needed for Pain.     lisinopril 40 MG tablet  Commonly known as:  PRINIVIL,ZESTRIL  Take 1 tablet (40 mg total) by mouth once daily.     metFORMIN 1000 MG tablet  Commonly known as:  GLUCOPHAGE  Take 1 tablet (1,000 mg total) by mouth 2 (two) times daily with meals.        STOP taking these medications    aspirin 81 MG Chew  Replaced by:  aspirin 81 MG EC tablet          Indwelling Lines/Drains at time of discharge:   Lines/Drains/Airways     Peripherally Inserted Central Catheter Line                 PICC Double Lumen 06/09/18 0944 right basilic 6 days          Pressure Ulcer                 Negative Pressure Wound Therapy  less than 1 day              Time spent on the discharge of patient: 25 minutes  Patient was seen and examined on the date of discharge and determined to be suitable for discharge.    Millicent Torre MD  Department of Hospital Medicine  [facility]  "

## 2018-06-15 NOTE — NURSING
Patient blood glucose 219.  Patient consumed 2 cartons of 2 % milk and arik crackers.  Medicine Team 2 notified of directives related to administering SSI.  Orders given to hold insulin since patient will be asleep and received 60 units of determir and as part of 2 units.  [place]

## 2018-06-15 NOTE — PLAN OF CARE
Patient accepted and approved to [facility], arranged transport with \[facility]\"", notified SPD that patient needs to transfer ASAP, will follow.    1210 Notified nurse to call report to [facility] at [phone], patient will be going to room 233.

## 2018-06-15 NOTE — PLAN OF CARE
"Problem: Diabetes, Type 2 (Adult)  Intervention: Optimize Glycemic Control   06/15/18 0202   Nutrition Interventions   Glycemic Management blood glucose monitoring;supplemental insulin given         Problem: Patient Care Overview  Goal: Plan of Care Review  Outcome: Ongoing (interventions implemented as appropriate)   06/15/18 0202   Coping/Psychosocial   Plan Of Care Reviewed With patient     Patient alert and oriented x 4 and able to make needs known.  Patient denies pain.  Patient presents with R foot infection and I& D services provided.  Dressing to R foot changed per podiatry.  Patient receives diabetic management and blood glucose 233 and received SSI coverage and scheduled determir  60 units.  Patient ambulates well and provides all personal hygiene care.  Patient reports, " I am just ready to get home, so I can go back to work."      Problem: Infection, Risk/Actual (Adult)  Goal: Identify Related Risk Factors and Signs and Symptoms  Related risk factors and signs and symptoms are identified upon initiation of Human Response Clinical Practice Guideline (CPG)    06/15/18 0202   Infection, Risk/Actual   Related Risk Factors (Infection, Risk/Actual) chronic illness/condition;skin integrity impairment;tissue perfusion altered   Signs and Symptoms (Infection, Risk/Actual) blood glucose changes       Problem: Fall Risk (Adult)  Intervention: Monitor/Assist with Self Care   06/15/18 0202   Activity   Activity Assistance Provided independent   Functional Level Current   Ambulation 0 - independent   Transferring 0 - independent   Toileting 0 - independent   Bathing 0 - independent   Dressing 0 - independent   Eating 0 - independent   Communication 0 - understands/communicates without difficulty   Swallowing 0 - swallows foods/liquids without difficulty           "

## 2018-06-15 NOTE — NURSING
Report given to Adry receiving nurse at [facility]. Pt aware of orders for transfer to [facility]. Discharge instructions provided. Reports understanding of teaching.

## 2018-06-15 NOTE — PROGRESS NOTES
Consult at LTAC received for application of NPWT. Pt of podiatry which is requesting NPWT to the wound. Dressing was removed and wound cleansed with NS and gauze. There is a mixed open wound/ surgical site to the dorsal foot extending between the second and third toe to the plantar surface. The wounds were pictured and measured separately for more accurate charting.  Plantar:  The base of the wound to the plantar surface has exposed tendon which was covered with adaptic prior to sponge. There are sutures proximally extending toward the toe. Between the toes is moist with open wound which extends dorsal  Dorsal.. The dorsal open area begins between the toes and is open until the sutured area mid dorsal foot. There is some eschar seen along the suture line approx 3 x 1.3 cm. The open area does tunnel under the suture line approx 3 cm. Adaptic was applied to the base of the wound.   NPWT/ - One continuous sponge was placed to the plantar surface through toes to the dorsal wound. The sutures to the plantar foot were protected with hydrofiber and draped.   After sponge placed, area was draped and seal achieved to 120 mmHg cont suction         06/15/18 1516       Negative Pressure Wound Therapy    Placement Date/Time: 06/15/18 1513   Side: Right  Location: Foot   NPWT Type Vacuum Therapy   Therapy Setting NPWT Continuous therapy   Pressure Setting NPWT 125 mmHg   Sponges Inserted NPWT 1   General Output (mL) (new)       Wound 06/15/18 1511 Diabetic Ulcer plantar Foot   Date First Assessed/Time First Assessed: 06/15/18 1511   Pre-existing: Yes  Primary Wound Type: Diabetic Ulcer  Side: Right  Orientation: plantar  Location: Foot   Wound Image    Wound WDL ex   Dressing Appearance Dry;Intact   Drainage Amount Moderate   Drainage Characteristics/Odor Serosanguineous   Appearance Yellow;Slough;Tendon   Tissue loss description Full thickness   Yellow (%), Wound Tissue Color 100 %   Periwound Area Other (see  comments)  (callous)   Wound Edges Open   Wound Length (cm) 7 cm   Wound Width (cm) 1.5 cm   Wound Depth (cm) 2.2 cm   Wound Volume (cm^3) 23.1 cm^3   Care Cleansed with:;Sterile normal saline   Dressing Removed;Applied;Changed   Dressing Change Due 06/18/18       Wound 06/15/18 1512 Diabetic Ulcer dorsal Foot   Date First Assessed/Time First Assessed: 06/15/18 1512   Pre-existing: Yes  Primary Wound Type: Diabetic Ulcer  Side: Right  Orientation: dorsal  Location: Foot   Wound Image    Wound WDL ex   Dressing Appearance Dry;Intact   Drainage Amount Moderate   Drainage Characteristics/Odor Serosanguineous;No odor   Appearance Yellow;Tendon;Slough   Yellow (%), Wound Tissue Color 100 %   Periwound Area Other (see comments)  (peeling and obscured with gentian violet)   Wound Edges Open   Wound Length (cm) 5 cm   Wound Width (cm) 1.3 cm   Wound Depth (cm) 1.5 cm   Wound Volume (cm^3) 9.75 cm^3   Tunneling (depth (cm)/location) 3 at 6 oclock  (twd ankle area)   Care Cleansed with:;Sterile normal saline   Dressing Removed;Applied;Changed   Dressing Change Due 06/18/18

## 2018-06-15 NOTE — PLAN OF CARE
[facility] is willing to accept patient today, however, podiatry recs are pending, IM2 team is reaching out to podiatry, will follow.

## 2018-06-15 NOTE — NURSING
Patient Education    Patient educated on proper use of Darco boot for R foot.  Patient demonstrated use and verbalized an understanding.

## 2018-06-15 NOTE — PLAN OF CARE
Problem: Diabetes, Type 2 (Adult)  Goal: Signs and Symptoms of Listed Potential Problems Will be Absent, Minimized or Managed (Diabetes, Type 2)  Signs and symptoms of listed potential problems will be absent, minimized or managed by discharge/transition of care (reference Diabetes, Type 2 (Adult) CPG).   Outcome: Ongoing (interventions implemented as appropriate)   06/14/18 1819   Diabetes, Type 2   Problems Assessed (Type 2 Diabetes) all   Problems Present (Type 2 Diabetes) none       Problem: Patient Care Overview  Goal: Plan of Care Review  Outcome: Ongoing (interventions implemented as appropriate)   06/14/18 1825   Coping/Psychosocial   Plan Of Care Reviewed With patient   No distress/discomfort noted in pt. blood glucose monitored before meals and insulin administered as ordered, pt tolerated diet & tray offered. No s/s of hyper/hypoglycemia noted in pt. wound care done by podiatrist. dressing to rt foot clean, dry & intact, BLE elevated. Pt ambulated hallway, all precautions maintained, will continue to monitor pt.     Problem: Fall Risk (Adult)  Goal: Absence of Falls  Patient will demonstrate the desired outcomes by discharge/transition of care.   Outcome: Ongoing (interventions implemented as appropriate)   06/14/18 1812   Fall Risk (Adult)   Absence of Falls making progress toward outcome

## 2018-06-15 NOTE — PROGRESS NOTES
LT[facility] nursing orders for foot wound:    1. Undress wound (remove all dressings) and cleanse with sterile saline  2. Apply gentian violet to periwound macerated areas  3. Apply small black sponge to wound, apply wound vac, set to 125 mmHg continuous   4. Dress entire foot with cast padding and flexinet or loose kerlix with loose ACE  5. Change vac Mondays, Wednesdays, and Fridays  6. Pt to f/u with podiatry once per week.     Please contact the on-call podiatry resident with questions or concerns    Thank you,     Lyudmila Yu  DPM PGY-2  [phone]

## 2018-06-15 NOTE — PLAN OF CARE
[facility]    FACILITY TRANSFER ORDERS      Patient Name: Billy Rivera  YOB: 1965    PCP: BRAD Baker MD   PCP Address: [address] [facility] / [place]  PCP Phone Number: [phone]  PCP Fax: [phone]    Encounter Date: 06/15/2018    Admit to: LTAC    Vital Signs:  Routine    Diagnoses:   Active Hospital Problems    Diagnosis  POA    *Diabetic ulcer of right midfoot [E11.621, L97.419]  Yes     Priority: 1 - High    Type 2 diabetes mellitus 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, with long-term current use of insulin [E11.3393, Z79.4]  Not Applicable     Priority: 2     HTN (hypertension) [I10]  Yes     Priority: 3     Foreign body in right foot [S90.851A]  Yes    Right foot infection [L08.9]  Yes    CAD s/p PCI of LAD (SHELBY) in May 2013 [I25.10]  Yes    Dyslipidemia [E78.5]  Yes      Resolved Hospital Problems    Diagnosis Date Resolved POA    Fever [R50.9] 06/09/2018 Unknown     Priority: 2     Leukocytosis [D72.829] 06/09/2018 Yes     Priority: 2     Diarrhea [R19.7] 06/11/2018 Unknown     Priority: 4     Colon cancer [C18.9] 06/04/2018 Yes       Allergies:  Review of patient's allergies indicates:   Allergen Reactions    Penicillins Other (See Comments)     PCN allergy as a child - was told he went into a coma. Tolerates Cefazolin without adverse reactions    Shellfish containing products      Other reaction(s): Unknown    Vancomycin Itching    Bactrim  [sulfamethoxazole-trimethoprim] Rash       Diet: diabetic diet: 2000 calorie    Activities: Weight bearing as tolerated    Nursing: N/A     Labs: CBC and BMP weekly     CONSULTS:    Physical Therapy to evaluate and treat. , Occupational Therapy to evaluate and treat. and  to evaluate for community resources/long-range planning.    MISCELLANEOUS CARE:  Diabetes Care:   SN to perform and educate Diabetic management with blood  glucose monitoring:, Fingerstick blood sugar AC and HS and Report CBG < 60 or > 350 to physician.    WOUND CARE ORDERS  Yes: Wound Vac:   Location:  R foot           Dressing changes every Monday, Wednesday and Friday.        ET Consult    Medications: Review discharge medications with patient and family and provide education.      Current Discharge Medication List      START taking these medications    Details   aspirin (ECOTRIN) 81 MG EC tablet Take 1 tablet (81 mg total) by mouth once daily.  Refills: 0      ceFAZolin (ANCEF) 1 gram injection Inject 2,000 mg (2 g total) into the vein every 8 (eight) hours. End date 7/14/18  Qty: [id number] mg, Refills: 0      collagenase ointment Apply topically once daily.  Qty: 90 g, Refills: 2      Lactobacillus rhamnosus GG (CULTURELLE) 10 billion cell capsule Take 1 capsule by mouth once daily.      magnesium oxide (MAG-OX) 400 mg tablet Take 1 tablet (400 mg total) by mouth 2 (two) times daily.  Refills: 0      psyllium husk, aspartame, (METAMUCIL) 3.4 gram PwPk packet Take 1 packet by mouth once daily.      senna-docusate 8.6-50 mg (PERICOLACE) 8.6-50 mg per tablet Take 1 tablet by mouth once daily.         CONTINUE these medications which have CHANGED    Details   carvedilol (COREG) 12.5 MG tablet Take 1 tablet (12.5 mg total) by mouth 2 (two) times daily.  Qty: 60 tablet, Refills: 11      insulin glargine (LANTUS SOLOSTAR U-100 INSULIN) 100 unit/mL (3 mL) InPn pen INJECT 60 UNITS SUBCUTANEOUSLY IN THE EVENING  Qty: 2 Box, Refills: 3    Associated Diagnoses: Type 2 diabetes mellitus with hyperglycemia, with long-term current use of insulin      insulin lispro (HUMALOG) 100 unit/mL injection Inject 30 units before every meal.  Qty: 18 mL, Refills: 3         CONTINUE these medications which have NOT CHANGED    Details   atorvastatin (LIPITOR) 80 MG tablet Take 1 tablet (80 mg total) by mouth once daily.  Qty: 30 tablet, Refills: 11      blood sugar diagnostic Strp Strips and  "lancets    Use before meals and bedtime  Qty: 150 strip, Refills: 12      ibuprofen (ADVIL,MOTRIN) 800 MG tablet Take 1 tablet (800 mg total) by mouth 3 (three) times daily as needed for Pain.  Qty: 50 tablet, Refills: 0      lisinopril (PRINIVIL,ZESTRIL) 40 MG tablet Take 1 tablet (40 mg total) by mouth once daily.  Qty: 30 tablet, Refills: 11      metFORMIN (GLUCOPHAGE) 1000 MG tablet Take 1 tablet (1,000 mg total) by mouth 2 (two) times daily with meals.  Qty: 60 tablet, Refills: 6    Associated Diagnoses: Type 2 diabetes mellitus with hyperglycemia, with long-term current use of insulin      pen needle, diabetic 31 gauge x 3/16" Ndle Inject 1 each into the skin 3 (three) times daily before meals.  Qty: 100 each, Refills: 12    Associated Diagnoses: Type 2 diabetes mellitus without complication, with long-term current use of insulin         STOP taking these medications       aspirin 81 MG Chew Comments:   Reason for Stopping:             _________________________________  Millicent Torre MD  06/15/2018        "

## 2018-06-18 PROBLEM — L03.119 CELLULITIS OF LEG: Status: ACTIVE | Noted: 2018-06-18

## 2018-06-18 PROBLEM — E83.42 HYPOMAGNESEMIA: Status: ACTIVE | Noted: 2018-06-18

## 2018-06-18 PROBLEM — E83.39 HYPOPHOSPHATEMIA: Status: ACTIVE | Noted: 2018-06-18

## 2018-06-18 PROBLEM — E83.51 HYPOCALCEMIA: Status: ACTIVE | Noted: 2018-06-18

## 2018-06-18 PROBLEM — D64.9 ANEMIA: Status: ACTIVE | Noted: 2018-06-18

## 2018-06-20 NOTE — PHYSICIAN QUERY
"PT Name: Billy Sheffield Miguel  MR #: [id number]    Physician Query Form - Procedure Clarification     Mario Nova RN CDS    Contact Information: [phone]    This form is a permanent document in the medical record.     Query Date: June 20, 2018  By submitting this query, we are merely seeking further clarification of documentation. Please utilize your independent clinical judgment when addressing the question(s) below.    The Medical record contains the following:     Indicators       Supporting Clinical Findings   Location in Medical Record   X Documentation of "Debridement"   A #15 blade was used to excise wound at the plantar right foot in an elliptical fashion. Next a straight hemostat was used to triangulate foreign body with C arm. Foreign body noted deep in soft tissues directly adjacent to medial 2nd metatarsal shaft nearly directly on bone.     Dorsal incision closed with 3-0 nylon. Final wound measurements right plantar foot: 6qrv8usp3.5cm, dorsal foot : 6mid3bhv0.5cm, 2nd interdigital space: 1.0cmx0.5cmx0.1cm.      6/7 OP note   X Documentation of "I & D" INCISION AND DRAINAGE, FOOT (Right)   REMOVAL, FOREIGN BODY, FOOT (Right)   6/7 OP note   X EBL = Estimated Blood Loss: <5ml    6/7 Op note    Other:       Excisional debridement is a surgical removal of  nonvitalized tissue, necrosis or slough. The use of a sharp instrument does not always indicate that an excisional debridement was performed.  Non excisional debridement is the scraping away or removal of loose tissue fragments.    Provider, please specify type of procedure(s) performed:    [  ] Excisional Debridement (Specify site, type, depth of tissue removal ,instrument, size of wound & EBL)   * Site: (Specify)___plantar right foot wound totally excised in elliptical fasion--remained open for packing__________________________________   * Depth of tissue excised:    [ x ] Skin/Subcutaneous [ ] Soft tissue [ ] Fascia [ ] Muscle [ ] Bone   * Instrument " used:    [ x ] Scalpel [ ] Scissors [ ] Curette [ ] Other (Specify) ____________________   * Size of wound after debridement: (Specify) ____4.0 x 2.0 x 0.5cm_________________________   * EBL (Specify) ______________10ml____________________    [  ] Non Excisional Debridement   * Depth of tissue debrided:    [  ] Skin/Subcutaneous [ ] Soft tissue [ ] Fascia [ ] Muscle [ ] Bone    [  ] Incision and Drainage    [  ] Other Procedure (Specify) ________________________________    [  ] Clinically Undetermined

## 2018-06-20 NOTE — PHYSICIAN QUERY
"PT Name: Billy Sheffield Miguel  MR #: [id number]    Physician Query Form - Procedure Clarification     Mario Nova RN CDS    Contact Information: [phone]    This form is a permanent document in the medical record.     Query Date: June 20, 2018  By submitting this query, we are merely seeking further clarification of documentation. Please utilize your independent clinical judgment when addressing the question(s) below.    The Medical record contains the following:     Indicators       Supporting Clinical Findings   Location in Medical Record   X Documentation of "Debridement"   Ronguer used to debride nonviable tissue. C arm used to triangulate foreign body. Foreign body noted at 1st interdigital space. Next incision was made on dorsal foot to assist with triangulation of foreign body.  All surgical sites copiously irrigated with 6L NS. Wounds packed with 1in. Plain packing soaked in saline.Wrapped with 4x4 gauze, kerlix and ACE.    6/5 OP note   X Documentation of "I & D" INCISION AND DRAINAGE, FOOT (Right   6/5 OP note   X EBL = Estimated Blood Loss: <5ml    6/5 OP note    Other:       Excisional debridement is a surgical removal of  nonvitalized tissue, necrosis or slough. The use of a sharp instrument does not always indicate that an excisional debridement was performed.  Non excisional debridement is the scraping away or removal of loose tissue fragments.    Provider, please specify type of procedure(s) performed:    [  xExcisional Debridement (Specify site, type, depth of tissue removal ,instrument, size of wound & EBL)   * Site: (Specify)________Right foot plantar wound_____________________________   * Depth of tissue excised:    [x  ] Skin/Subcutaneous [ ] Soft tissue [ ] Fascia [ ] Muscle [ ] Bone   * Instrument used:    [ x ] Scalpel [ ] Scissors [ ] Curette [ ] Other (Specify) ____________________   * Size of wound after debridement: (Specify) ________________4.0 x 2.0 x 0.5cm_____________   * EBL " (Specify) ______________10ml____________________    [  ] Non Excisional Debridement   * Depth of tissue debrided:    [  ] Skin/Subcutaneous [ ] Soft tissue [ ] Fascia [ ] Muscle [ ] Bone    [  ] Incision and Drainage    [  ] Other Procedure (Specify) ________________________________    [  ] Clinically Undetermined

## 2018-06-21 PROBLEM — A49.1 GROUP B STREPTOCOCCAL INFECTION: Status: ACTIVE | Noted: 2018-06-21

## 2018-06-22 ENCOUNTER — TELEPHONE (OUTPATIENT)
Dept: PODIATRY | Facility: CLINIC | Age: 53
End: 2018-06-22

## 2018-06-22 NOTE — PHYSICIAN QUERY
"PT Name: Billy Sheffield Miguel  MR #: [id number]    Physician Query Form - Procedure Clarification     Mario Nova RN CDS    Contact Information: [phone]    This form is a permanent document in the medical record.     Query Date: June 22, 2018  By submitting this query, we are merely seeking further clarification of documentation. Please utilize your independent clinical judgment when addressing the question(s) below.    The Medical record contains the following:     Indicators       Supporting Clinical Findings   Location in Medical Record   X Documentation of "Debridement"   A #15 blade was used to excise wound at the plantar right foot in an elliptical fashion. Next a straight hemostat was used to triangulate foreign body with C arm. Foreign body noted deep in soft tissues directly adjacent to medial 2nd metatarsal shaft nearly directly on bone.     Dorsal incision closed with 3-0 nylon. Final wound measurements right plantar foot: 7lof4bnk5.5cm, dorsal foot : 7ynh9sme7.5cm, 2nd interdigital space: 1.0cmx0.5cmx0.1cm.      6/7 OP note   X Documentation of "I & D" INCISION AND DRAINAGE, FOOT (Right)   REMOVAL, FOREIGN BODY, FOOT (Right)   6/7 OP note   X EBL = Estimated Blood Loss: <5ml    6/7 Op note    Other:       Excisional debridement is a surgical removal of  nonvitalized tissue, necrosis or slough. The use of a sharp instrument does not always indicate that an excisional debridement was performed.  Non excisional debridement is the scraping away or removal of loose tissue fragments.    Provider, please specify type of procedure(s) performed:    [ x ] Excisional Debridement (Specify site, type, depth of tissue removal ,instrument, size of wound & EBL)   * Site: (Specify)___plantar right foot wound totally excised in elliptical fasion--remained open for packing__________________________________   * Depth of tissue excised:    [ x ] Skin/Subcutaneous [ ] Soft tissue [ ] Fascia [ ] Muscle [ ] Bone   * " Instrument used:    [ x ] Scalpel [ ] Scissors [ ] Curette [ ] Other (Specify) ____________________   * Size of wound after debridement: (Specify) ____4.0 x 2.0 x 0.5cm_________________________   * EBL (Specify) ______________5ml____________________    [  ] Non Excisional Debridement   * Depth of tissue debrided:    [  ] Skin/Subcutaneous [ ] Soft tissue [ ] Fascia [ ] Muscle [ ] Bone    [ x ] Incision and Drainage    [  ] Other Procedure (Specify) ________________________________    [  ] Clinically Undetermined

## 2018-06-25 PROBLEM — E11.621 DIABETIC FOOT ULCER ASSOCIATED WITH TYPE 2 DIABETES MELLITUS: Status: ACTIVE | Noted: 2018-06-25

## 2018-06-25 PROBLEM — L97.509 DIABETIC FOOT ULCER ASSOCIATED WITH TYPE 2 DIABETES MELLITUS: Status: ACTIVE | Noted: 2018-06-25

## 2018-06-27 PROBLEM — G47.00 INSOMNIA: Status: ACTIVE | Noted: 2018-06-27

## 2018-07-05 LAB
FUNGUS SPEC CULT: NORMAL
FUNGUS SPEC CULT: NORMAL

## 2018-07-19 PROBLEM — E83.39 HYPOPHOSPHATEMIA: Status: RESOLVED | Noted: 2018-06-18 | Resolved: 2018-07-19

## 2018-07-19 PROBLEM — E83.51 HYPOCALCEMIA: Status: RESOLVED | Noted: 2018-06-18 | Resolved: 2018-07-19

## 2018-07-20 ENCOUNTER — TELEPHONE (OUTPATIENT)
Dept: PODIATRY | Facility: CLINIC | Age: 53
End: 2018-07-20

## 2018-07-20 NOTE — TELEPHONE ENCOUNTER
Spoke with Helen regarding appt for pat. Told her I have to talk to my coworker and will call her back with appointment

## 2018-07-20 NOTE — TELEPHONE ENCOUNTER
----- Message from Serena Estes sent at 7/20/2018  9:07 AM CDT -----  Contact: Maki -  Called requesting a follow up apt specifically with Dr. Powell per Dr. Ryan regarding this pt incision/foot surgery with Dr. Santana.  Please contact Maki [phone] regarding this issue

## 2018-07-23 ENCOUNTER — OFFICE VISIT (OUTPATIENT)
Dept: PODIATRY | Facility: CLINIC | Age: 53
End: 2018-07-23
Payer: COMMERCIAL

## 2018-07-23 VITALS
WEIGHT: 278 LBS | HEART RATE: 67 BPM | BODY MASS INDEX: 36.84 KG/M2 | SYSTOLIC BLOOD PRESSURE: 127 MMHG | HEIGHT: 73 IN | DIASTOLIC BLOOD PRESSURE: 76 MMHG

## 2018-07-23 DIAGNOSIS — Z98.890 POST-OPERATIVE STATE: Primary | ICD-10-CM

## 2018-07-23 PROCEDURE — 99024 POSTOP FOLLOW-UP VISIT: CPT | Mod: S$GLB,,, | Performed by: PODIATRIST

## 2018-07-23 PROCEDURE — 99999 PR PBB SHADOW E&M-EST. PATIENT-LVL III: CPT | Mod: PBBFAC,,, | Performed by: PODIATRIST

## 2018-07-25 ENCOUNTER — TELEPHONE (OUTPATIENT)
Dept: PODIATRY | Facility: CLINIC | Age: 53
End: 2018-07-25

## 2018-07-25 NOTE — TELEPHONE ENCOUNTER
Patient states you wanted him to inform him when will he be going to his appt at [facility]  For wound care which is 8/1/18 also patient said he will continue wound care treatment at [facility]  On a weekly basics only on a Wednesday and continue nail care with you every 3 mons

## 2018-07-25 NOTE — TELEPHONE ENCOUNTER
----- Message from Katie Foreman sent at 7/25/2018  1:53 PM CDT -----  Contact: self   Patient states have an appointment with wound care at [facility] on next Wed-8/1

## 2018-07-25 NOTE — TELEPHONE ENCOUNTER
Dr Abrams wanted to know when was his apt for [facility] 8/1 Patient states he need to continue wound care with [facility] on a weekly basic every Wednesday.

## 2018-07-31 NOTE — PROGRESS NOTES
Subjective:      Patient ID: Billy Sheffield Miguel is a 53 y.o. male.    Chief Complaint: Diabetes Mellitus (dariel brown/06/01/2018) and Diabetic Foot Exam    Pt presents to clinic s/p I&D x3 and removal of foreign object (glass) Pt has been [facility] and has HH nursing doing dressing changes. Pt is no longer on wound vac. Pt denies any nausea or vomiting      Review of Systems   Constitution: Negative for chills, fever and malaise/fatigue.   HENT: Negative for hearing loss.    Cardiovascular: Positive for leg swelling. Negative for claudication.   Respiratory: Negative for shortness of breath.    Skin: Negative for flushing and rash.   Musculoskeletal: Negative for joint pain and myalgias.   Neurological: Negative for loss of balance, numbness, paresthesias and sensory change.   Psychiatric/Behavioral: Negative for altered mental status.           Objective:      Physical Exam   Constitutional: He appears well-developed and well-nourished. He is cooperative.   Cardiovascular:   Pulses:       Dorsalis pedis pulses are 0 on the right side, and 0 on the left side.        Posterior tibial pulses are 1+ on the right side, and 1+ on the left side.   Non-pitting LE edema noted b/L   Musculoskeletal:        Right ankle: He exhibits decreased range of motion and abnormal pulse. Achilles tendon exhibits normal Kimble's test results.        Left ankle: He exhibits decreased range of motion and abnormal pulse. Achilles tendon exhibits normal Kimble's test results.        Right foot: There is decreased range of motion.        Left foot: There is decreased range of motion.   Feet:   Right Foot:   Protective Sensation: 5 sites tested. 2 sites sensed.   Left Foot:   Protective Sensation: 5 sites tested. 2 sites sensed.   Neurological: He is alert.   diminished sensation noted to b/L lower extremities   Skin: Skin is dry. Capillary refill takes more than 3 seconds.   Right foot incision from dorsal foot through 2nd  interspace extending to plantar foot.   4 sutures still intact, removed.   Incision is not closed, dept 0.2cm except for dorsal foot where it is closed. No purulence noted.    Psychiatric: His behavior is normal.   Vitals reviewed.            Assessment:       Encounter Diagnosis   Name Primary?    Post-operative state - Right Foot Yes         Plan:       Billy was seen today for diabetes mellitus and diabetic foot exam.    Diagnoses and all orders for this visit:    Post-operative state - Right Foot      I counseled the patient on his conditions, their implications and medical management.      Incision site cleansed with betadine, aquacell applied to incision.   Football dressing applied to pts right foot by Dhara Guerra MA under my direct supervision. Pt tolerated dressing well.   RTC in 2 weeks or sooner if any new pedal problems should arise or if condition worsens.       .

## 2018-08-04 LAB
ACID FAST MOD KINY STN SPEC: NORMAL
ACID FAST MOD KINY STN SPEC: NORMAL
MYCOBACTERIUM SPEC QL CULT: NORMAL
MYCOBACTERIUM SPEC QL CULT: NORMAL

## 2018-08-07 ENCOUNTER — OFFICE VISIT (OUTPATIENT)
Dept: PODIATRY | Facility: CLINIC | Age: 53
End: 2018-08-07
Payer: COMMERCIAL

## 2018-08-07 VITALS
DIASTOLIC BLOOD PRESSURE: 78 MMHG | BODY MASS INDEX: 36.45 KG/M2 | HEIGHT: 73 IN | HEART RATE: 56 BPM | SYSTOLIC BLOOD PRESSURE: 118 MMHG | WEIGHT: 275 LBS

## 2018-08-07 DIAGNOSIS — Z98.890 POST-OPERATIVE STATE: Primary | ICD-10-CM

## 2018-08-07 PROCEDURE — 99999 PR PBB SHADOW E&M-EST. PATIENT-LVL III: CPT | Mod: PBBFAC,,, | Performed by: PODIATRIST

## 2018-08-07 PROCEDURE — 99024 POSTOP FOLLOW-UP VISIT: CPT | Mod: S$GLB,,, | Performed by: PODIATRIST

## 2018-08-13 NOTE — PROGRESS NOTES
Subjective:      Patient ID: Billy Yatesn is a 53 y.o. male.    Chief Complaint: Diabetes Mellitus (dariel brown/02/22/2018) and Diabetic Foot Exam    Pt presents to clinic s/p I&D x3 and removal of foreign object (glass) Pt has been d/[facility]a from [facility] and has HH nursing doing dressing changes. Pt is no longer on wound vac. Pt denies any nausea or vomiting      Review of Systems   Constitution: Negative for chills, fever and malaise/fatigue.   HENT: Negative for hearing loss.    Cardiovascular: Positive for leg swelling. Negative for claudication.   Respiratory: Negative for shortness of breath.    Skin: Positive for dry skin and poor wound healing. Negative for flushing and rash.   Musculoskeletal: Negative for joint pain and myalgias.   Neurological: Negative for loss of balance, numbness, paresthesias and sensory change.   Psychiatric/Behavioral: Negative for altered mental status.           Objective:      Physical Exam   Constitutional: He appears well-developed and well-nourished. He is cooperative.   Cardiovascular:   Pulses:       Dorsalis pedis pulses are 0 on the right side, and 0 on the left side.        Posterior tibial pulses are 1+ on the right side, and 1+ on the left side.   Non-pitting LE edema noted b/L   Musculoskeletal:        Right ankle: He exhibits decreased range of motion and abnormal pulse. Achilles tendon exhibits normal Kimble's test results.        Left ankle: He exhibits decreased range of motion and abnormal pulse. Achilles tendon exhibits normal Kimble's test results.        Right foot: There is decreased range of motion.        Left foot: There is decreased range of motion.   Feet:   Right Foot:   Protective Sensation: 5 sites tested. 2 sites sensed.   Left Foot:   Protective Sensation: 5 sites tested. 2 sites sensed.   Neurological: He is alert.   diminished sensation noted to b/L lower extremities   Skin: Skin is dry. Capillary refill takes more than 3 seconds.   Right  foot incision from dorsal foot through 2nd interspace extending to plantar foot.   Incision is not closed, dept 0.2cm except for dorsal foot where it is closed. No purulence noted.    Psychiatric: His behavior is normal.   Vitals reviewed.            Assessment:       Encounter Diagnosis   Name Primary?    Post-operative state - Right Foot Yes         Plan:       Billy was seen today for diabetes mellitus and diabetic foot exam.    Diagnoses and all orders for this visit:    Post-operative state - Right Foot      I counseled the patient on his conditions, their implications and medical management.      Incision site cleansed with betadine, aquacell applied to incision.   Football dressing applied to pts right foot by Dhara Guerra MA under my direct supervision. Pt tolerated dressing well.   RTC in 2 weeks or sooner if any new pedal problems should arise or if condition worsens.       .

## 2018-08-21 ENCOUNTER — OFFICE VISIT (OUTPATIENT)
Dept: PODIATRY | Facility: CLINIC | Age: 53
End: 2018-08-21
Payer: OTHER MISCELLANEOUS

## 2018-08-21 VITALS
HEART RATE: 66 BPM | BODY MASS INDEX: 36.45 KG/M2 | DIASTOLIC BLOOD PRESSURE: 75 MMHG | WEIGHT: 275 LBS | SYSTOLIC BLOOD PRESSURE: 122 MMHG | HEIGHT: 73 IN

## 2018-08-21 DIAGNOSIS — T81.31XS POSTOPERATIVE WOUND DEHISCENCE, SEQUELA: ICD-10-CM

## 2018-08-21 DIAGNOSIS — Z98.890 POST-OPERATIVE STATE: Primary | ICD-10-CM

## 2018-08-21 PROCEDURE — 99999 PR PBB SHADOW E&M-EST. PATIENT-LVL IV: CPT | Mod: PBBFAC,,, | Performed by: PODIATRIST

## 2018-08-21 PROCEDURE — 87075 CULTR BACTERIA EXCEPT BLOOD: CPT

## 2018-08-21 PROCEDURE — 87076 CULTURE ANAEROBE IDENT EACH: CPT | Mod: 59

## 2018-08-21 PROCEDURE — 99024 POSTOP FOLLOW-UP VISIT: CPT | Mod: S$GLB,,, | Performed by: PODIATRIST

## 2018-08-21 PROCEDURE — 87070 CULTURE OTHR SPECIMN AEROBIC: CPT

## 2018-08-23 ENCOUNTER — TELEPHONE (OUTPATIENT)
Dept: PODIATRY | Facility: CLINIC | Age: 53
End: 2018-08-23

## 2018-08-23 LAB — BACTERIA SPEC AEROBE CULT: NORMAL

## 2018-08-27 LAB
BACTERIA SPEC ANAEROBE CULT: NORMAL
BACTERIA SPEC ANAEROBE CULT: NORMAL

## 2018-08-27 NOTE — PROGRESS NOTES
Subjective:      Patient ID: Billy Yatesn is a 53 y.o. male.    Chief Complaint: Post-op Evaluation    Pt presents to clinic s/p I&D x3 and removal of foreign object (glass) Pt has been d/rosa from [facility] and has  nursing doing dressing changes. Pt is no longer on wound vac. Pt denies any nausea or vomiting    8/21/2018: Pt states that he has also been seeing a wound care doctor at [facility] that advised him to soak the wound. Pt was advised that he cannot have 2 doctors treating the same wound with 2 different treatment plans. Pt states that he would prefer [facility].       Review of Systems   Constitution: Negative for chills, fever and malaise/fatigue.   HENT: Negative for hearing loss.    Cardiovascular: Positive for leg swelling. Negative for claudication.   Respiratory: Negative for shortness of breath.    Skin: Positive for dry skin and poor wound healing. Negative for flushing and rash.   Musculoskeletal: Negative for joint pain and myalgias.   Neurological: Negative for loss of balance, numbness, paresthesias and sensory change.   Psychiatric/Behavioral: Negative for altered mental status.           Objective:      Physical Exam   Constitutional: He appears well-developed and well-nourished. He is cooperative.   Cardiovascular:   Pulses:       Dorsalis pedis pulses are 0 on the right side, and 0 on the left side.        Posterior tibial pulses are 1+ on the right side, and 1+ on the left side.   Non-pitting LE edema noted b/L   Musculoskeletal:        Right ankle: He exhibits decreased range of motion and abnormal pulse. Achilles tendon exhibits normal Kimble's test results.        Left ankle: He exhibits decreased range of motion and abnormal pulse. Achilles tendon exhibits normal Kimble's test results.        Right foot: There is decreased range of motion.        Left foot: There is decreased range of motion.   Feet:   Right Foot:   Protective Sensation: 5 sites tested. 2 sites sensed.    Left Foot:   Protective Sensation: 5 sites tested. 2 sites sensed.   Neurological: He is alert.   diminished sensation noted to b/L lower extremities   Skin: Skin is dry. Capillary refill takes more than 3 seconds.   Right foot incision from dorsal foot through 2nd interspace extending to plantar foot.   Incision is closed, except for dorsal foot where it is open with a depth of 0.2cm. There is malodor noted to wound on today's visit. Dorsal wound dehiscence cultured.     Psychiatric: His behavior is normal.   Vitals reviewed.            Assessment:       Encounter Diagnoses   Name Primary?    Post-operative state - Right Foot Yes    Postoperative wound dehiscence, sequela          Plan:       Billy was seen today for post-op evaluation.    Diagnoses and all orders for this visit:    Post-operative state - Right Foot  -     Aerobic culture  -     Culture, Anaerobic    Postoperative wound dehiscence, sequela  -     Aerobic culture  -     Culture, Anaerobic      I counseled the patient on his conditions, their implications and medical management.      Incision site cleansed with betadine, endoform applied to incision. Pt advised to keep the foot dry, and not to soak it or get the dressing wet.   Pt advised that the would was cultured due to malodor and if he needs abx he will be contacted and they will be sent to his pharmacy.   Football dressing applied to pts right foot by Dhara Guerra MA under my direct supervision. Pt tolerated dressing well.   RTC in 2 weeks or sooner if any new pedal problems should arise or if condition worsens.       .

## 2018-08-28 ENCOUNTER — OFFICE VISIT (OUTPATIENT)
Dept: INFECTIOUS DISEASES | Facility: CLINIC | Age: 53
End: 2018-08-28
Payer: OTHER MISCELLANEOUS

## 2018-08-28 ENCOUNTER — OFFICE VISIT (OUTPATIENT)
Dept: PODIATRY | Facility: CLINIC | Age: 53
End: 2018-08-28
Payer: OTHER MISCELLANEOUS

## 2018-08-28 VITALS
SYSTOLIC BLOOD PRESSURE: 121 MMHG | HEART RATE: 58 BPM | HEIGHT: 73 IN | TEMPERATURE: 98 F | DIASTOLIC BLOOD PRESSURE: 66 MMHG | BODY MASS INDEX: 36.84 KG/M2 | WEIGHT: 278 LBS

## 2018-08-28 VITALS
DIASTOLIC BLOOD PRESSURE: 85 MMHG | SYSTOLIC BLOOD PRESSURE: 152 MMHG | HEIGHT: 73 IN | HEART RATE: 52 BPM | WEIGHT: 278 LBS | BODY MASS INDEX: 36.84 KG/M2

## 2018-08-28 DIAGNOSIS — T81.89XS DELAYED SURGICAL WOUND HEALING, SEQUELA: ICD-10-CM

## 2018-08-28 DIAGNOSIS — A49.8 ANAEROBIC BACTERIAL INFECTION: ICD-10-CM

## 2018-08-28 DIAGNOSIS — Z98.890 POST-OPERATIVE STATE: Primary | ICD-10-CM

## 2018-08-28 DIAGNOSIS — E08.621 DIABETIC ULCER OF RIGHT MIDFOOT ASSOCIATED WITH DIABETES MELLITUS DUE TO UNDERLYING CONDITION, LIMITED TO BREAKDOWN OF SKIN: Primary | ICD-10-CM

## 2018-08-28 DIAGNOSIS — L97.411 DIABETIC ULCER OF RIGHT MIDFOOT ASSOCIATED WITH DIABETES MELLITUS DUE TO UNDERLYING CONDITION, LIMITED TO BREAKDOWN OF SKIN: Primary | ICD-10-CM

## 2018-08-28 PROBLEM — A49.1 GROUP B STREPTOCOCCAL INFECTION: Status: RESOLVED | Noted: 2018-06-21 | Resolved: 2018-08-28

## 2018-08-28 PROBLEM — L03.119 CELLULITIS OF LEG: Status: RESOLVED | Noted: 2018-06-18 | Resolved: 2018-08-28

## 2018-08-28 PROCEDURE — 99999 PR PBB SHADOW E&M-EST. PATIENT-LVL III: CPT | Mod: PBBFAC,,, | Performed by: PODIATRIST

## 2018-08-28 PROCEDURE — 99024 POSTOP FOLLOW-UP VISIT: CPT | Mod: S$GLB,,, | Performed by: PODIATRIST

## 2018-08-28 PROCEDURE — 99999 PR PBB SHADOW E&M-EST. PATIENT-LVL III: CPT | Mod: PBBFAC,,, | Performed by: PHYSICIAN ASSISTANT

## 2018-08-28 PROCEDURE — 99213 OFFICE O/P EST LOW 20 MIN: CPT | Mod: S$GLB,,, | Performed by: PHYSICIAN ASSISTANT

## 2018-08-28 RX ORDER — METRONIDAZOLE 500 MG/1
500 TABLET ORAL 3 TIMES DAILY
Qty: 42 TABLET | Refills: 0 | Status: SHIPPED | OUTPATIENT
Start: 2018-08-28 | End: 2018-09-11

## 2018-08-28 RX ORDER — CIPROFLOXACIN 250 MG/1
250 TABLET, FILM COATED ORAL EVERY 12 HOURS
Qty: 28 TABLET | Refills: 0 | Status: SHIPPED | OUTPATIENT
Start: 2018-08-28 | End: 2018-08-28 | Stop reason: CLARIF

## 2018-08-28 NOTE — PROGRESS NOTES
Subjective:      Patient ID: Billy Rivera is a 53 y.o. male.    Chief Complaint:Hospital Follow Up    History of Present Illness  Pt had a recent hospitalization in June for right foot infection/osteomyelitis due to strep. Pt had a retained foreign body (glass) with multiple attempts at removing glass; there still remains a small portion of the glass. Pt completed 6 weeks of cefazolin ending 7/19. He has been doing well. He followed up with podiatry last week and there was maceration and malodor noted. She cultured the area which grew 2 anaerobes (anaerococcus vaginalis and prevotella). Pt is seen in podiatry today. Per the podiatrist, patient's wound appears much better. Pt denies fevers or chills. No other complaints.    Review of Systems   Constitution: Negative for chills, decreased appetite, fever, weakness, malaise/fatigue, night sweats, weight gain and weight loss.   HENT: Negative for congestion, ear pain, hearing loss, hoarse voice, sore throat and tinnitus.    Eyes: Negative for blurred vision, redness and visual disturbance.   Cardiovascular: Negative for chest pain, leg swelling and palpitations.   Respiratory: Negative for cough, hemoptysis, shortness of breath and sputum production.    Hematologic/Lymphatic: Negative for adenopathy. Does not bruise/bleed easily.   Skin: Negative for dry skin, itching, rash and suspicious lesions.        (+) right foot wound   Musculoskeletal: Negative for back pain, joint pain, myalgias and neck pain.   Gastrointestinal: Negative for abdominal pain, constipation, diarrhea, heartburn, nausea and vomiting.   Genitourinary: Negative for dysuria, flank pain, frequency, hematuria, hesitancy and urgency.   Neurological: Negative for dizziness, headaches, numbness and paresthesias.   Psychiatric/Behavioral: Negative for depression and memory loss. The patient does not have insomnia and is not nervous/anxious.      Objective:   Physical Exam   Constitutional: He is  oriented to person, place, and time. He appears well-developed and well-nourished. No distress.   Cardiovascular: Normal rate and regular rhythm.   No murmur heard.  Pulmonary/Chest: Effort normal and breath sounds normal. No respiratory distress.   Abdominal: Soft. Bowel sounds are normal. He exhibits no distension.   Musculoskeletal: Normal range of motion. He exhibits no edema.   Neurological: He is alert and oriented to person, place, and time.   Skin: Skin is warm and dry.   Right dorsal foot without drainage, erythema, or malodor. See image today.   Psychiatric: He has a normal mood and affect. His behavior is normal.           Assessment:       1. Diabetic ulcer of right midfoot associated with diabetes mellitus due to underlying condition, limited to breakdown of skin        Cultures from last week grew prevotella and anaerococcus vaginalis.  Pt not currently on antibiotics.     Plan:     1. Start flagyl 500 mg PO TID for anaerobic foot infection  2. Continue wound care per podiatry recs  3. F/u in 2 weeks or sooner for worsening issues; if infection worsens, may need to consider removing foreign body

## 2018-09-04 ENCOUNTER — OFFICE VISIT (OUTPATIENT)
Dept: PODIATRY | Facility: CLINIC | Age: 53
End: 2018-09-04
Payer: COMMERCIAL

## 2018-09-04 VITALS
HEART RATE: 60 BPM | BODY MASS INDEX: 36.84 KG/M2 | WEIGHT: 278 LBS | DIASTOLIC BLOOD PRESSURE: 85 MMHG | SYSTOLIC BLOOD PRESSURE: 140 MMHG | HEIGHT: 73 IN

## 2018-09-04 DIAGNOSIS — T81.89XS DELAYED SURGICAL WOUND HEALING, SEQUELA: Primary | ICD-10-CM

## 2018-09-04 DIAGNOSIS — Z98.890 POST-OPERATIVE STATE: ICD-10-CM

## 2018-09-04 PROCEDURE — 99024 POSTOP FOLLOW-UP VISIT: CPT | Mod: S$GLB,,, | Performed by: PODIATRIST

## 2018-09-04 PROCEDURE — 99999 PR PBB SHADOW E&M-EST. PATIENT-LVL III: CPT | Mod: PBBFAC,,, | Performed by: PODIATRIST

## 2018-09-04 NOTE — PROGRESS NOTES
Subjective:      Patient ID: Billy Sheffield Miguel is a 53 y.o. male.    Chief Complaint: Diabetes Mellitus (dariel brown/06/1/2018) and Diabetic Foot Exam    Pt presents to clinic s/p I&D x3 and removal of foreign object (glass) Pt has been [facility]a from [facility] and has HH nursing doing dressing changes. Pt is no longer on wound vac. Pt denies any nausea or vomiting    8/21/2018: Pt states that he has also been seeing a wound care doctor at [facility] that advised him to soak the wound. Pt was advised that he cannot have 2 doctors treating the same wound with 2 different treatment plans. Pt states that he would prefer [facility].   8/28/2018: Culture positive for anaerobes, ID present on today's visit, flagyl prescribed.       Review of Systems   Constitution: Negative for chills, fever and malaise/fatigue.   HENT: Negative for hearing loss.    Cardiovascular: Positive for leg swelling. Negative for claudication.   Respiratory: Negative for shortness of breath.    Skin: Positive for dry skin and poor wound healing. Negative for flushing and rash.   Musculoskeletal: Negative for joint pain and myalgias.   Neurological: Negative for loss of balance, numbness, paresthesias and sensory change.   Psychiatric/Behavioral: Negative for altered mental status.           Objective:      Physical Exam   Constitutional: He appears well-developed and well-nourished. He is cooperative.   Cardiovascular:   Pulses:       Dorsalis pedis pulses are 0 on the right side, and 0 on the left side.        Posterior tibial pulses are 1+ on the right side, and 1+ on the left side.   Non-pitting LE edema noted b/L   Musculoskeletal:        Right ankle: He exhibits decreased range of motion and abnormal pulse. Achilles tendon exhibits normal Kimble's test results.        Left ankle: He exhibits decreased range of motion and abnormal pulse. Achilles tendon exhibits normal Kimble's test results.        Right foot: There is decreased range  of motion.        Left foot: There is decreased range of motion.   Feet:   Right Foot:   Protective Sensation: 5 sites tested. 2 sites sensed.   Left Foot:   Protective Sensation: 5 sites tested. 2 sites sensed.   Neurological: He is alert.   diminished sensation noted to b/L lower extremities   Skin: Skin is dry. Capillary refill takes more than 3 seconds.   Right foot incision from dorsal foot through 2nd interspace extending to plantar foot.   Incision is closed, except for dorsal foot where it is open with a depth of 0.2cm. Malodor decreased from previous visit.    Psychiatric: His behavior is normal.   Vitals reviewed.                Assessment:       Encounter Diagnoses   Name Primary?    Post-operative state - Right Foot Yes    Delayed surgical wound healing, sequela          Plan:       Billy was seen today for diabetes mellitus and diabetic foot exam.    Diagnoses and all orders for this visit:    Post-operative state - Right Foot  -     Discontinue: ciprofloxacin HCl (CIPRO) 250 MG tablet; Take 1 tablet (250 mg total) by mouth every 12 (twelve) hours.    Delayed surgical wound healing, sequela      I counseled the patient on his conditions, their implications and medical management.      Incision site cleansed with betadine, endoform applied to incision. Pt advised to keep the foot dry, and not to soak it or get the dressing wet.   ID present on today's visit, flagyl prescribed for anaerobes.   Football dressing applied to pts right foot by Dhara Guerra MA under my direct supervision. Pt tolerated dressing well.   RTC in 1 weeks or sooner if any new pedal problems should arise or if condition worsens.       .

## 2018-09-10 NOTE — PROGRESS NOTES
Subjective:      Patient ID: Billy Sheffield Miguel is a 53 y.o. male.    Chief Complaint: Diabetes Mellitus (dr henry 06/01/2018) and Diabetic Foot Exam    Pt presents to clinic s/p I&D x3 and removal of foreign object (glass) Pt has been d/rosa from [facility] and has HH nursing doing dressing changes. Pt is no longer on wound vac. Pt denies any nausea or vomiting    8/21/2018: Pt states that he has also been seeing a wound care doctor at [facility] that advised him to soak the wound. Pt was advised that he cannot have 2 doctors treating the same wound with 2 different treatment plans. Pt states that he would prefer [facility].   8/28/2018: Culture positive for anaerobes, ID present on today's visit, flagyl prescribed.       Review of Systems   Constitution: Negative for chills, fever and malaise/fatigue.   HENT: Negative for hearing loss.    Cardiovascular: Positive for leg swelling. Negative for claudication.   Respiratory: Negative for shortness of breath.    Skin: Positive for dry skin and poor wound healing. Negative for flushing and rash.   Musculoskeletal: Negative for joint pain and myalgias.   Neurological: Negative for loss of balance, numbness, paresthesias and sensory change.   Psychiatric/Behavioral: Negative for altered mental status.           Objective:      Physical Exam   Constitutional: He appears well-developed and well-nourished. He is cooperative.   Cardiovascular:   Pulses:       Dorsalis pedis pulses are 0 on the right side, and 0 on the left side.        Posterior tibial pulses are 1+ on the right side, and 1+ on the left side.   Non-pitting LE edema noted b/L   Musculoskeletal:        Right ankle: He exhibits decreased range of motion and abnormal pulse. Achilles tendon exhibits normal Kimble's test results.        Left ankle: He exhibits decreased range of motion and abnormal pulse. Achilles tendon exhibits normal Kimble's test results.        Right foot: There is decreased range of  motion.        Left foot: There is decreased range of motion.   Feet:   Right Foot:   Protective Sensation: 5 sites tested. 2 sites sensed.   Left Foot:   Protective Sensation: 5 sites tested. 2 sites sensed.   Neurological: He is alert.   diminished sensation noted to b/L lower extremities   Skin: Skin is dry. Capillary refill takes more than 3 seconds.   Right foot incision from dorsal foot through 2nd interspace extending to plantar foot.   Incision is closed, except for dorsal foot where it is open with a depth of 0.2cm. No malodor noted.    Psychiatric: His behavior is normal.   Vitals reviewed.                Assessment:       Encounter Diagnoses   Name Primary?    Delayed surgical wound healing, sequela - Right Foot Yes    Post-operative state - Right Foot          Plan:       Billy was seen today for diabetes mellitus and diabetic foot exam.    Diagnoses and all orders for this visit:    Delayed surgical wound healing, sequela - Right Foot    Post-operative state - Right Foot      I counseled the patient on his conditions, their implications and medical management.      Incision site cleansed with betadine, endoform applied to incision. Pt advised to keep the foot dry, and not to soak it or get the dressing wet.   Pt advised to continue taking flagyl and not to take the cipro.   Football dressing applied to pts right foot by Dhara Guerra MA under my direct supervision. Pt tolerated dressing well.   RTC in 1 weeks or sooner if any new pedal problems should arise or if condition worsens.       .

## 2018-09-11 ENCOUNTER — OFFICE VISIT (OUTPATIENT)
Dept: PODIATRY | Facility: CLINIC | Age: 53
End: 2018-09-11
Payer: OTHER MISCELLANEOUS

## 2018-09-11 VITALS
WEIGHT: 273 LBS | HEART RATE: 54 BPM | DIASTOLIC BLOOD PRESSURE: 75 MMHG | SYSTOLIC BLOOD PRESSURE: 123 MMHG | BODY MASS INDEX: 36.18 KG/M2 | HEIGHT: 73 IN

## 2018-09-11 DIAGNOSIS — Z98.890 POST-OPERATIVE STATE: ICD-10-CM

## 2018-09-11 DIAGNOSIS — T81.89XS DELAYED SURGICAL WOUND HEALING, SEQUELA: Primary | ICD-10-CM

## 2018-09-11 PROCEDURE — 99999 PR PBB SHADOW E&M-EST. PATIENT-LVL III: CPT | Mod: PBBFAC,,, | Performed by: PODIATRIST

## 2018-09-11 PROCEDURE — 99024 POSTOP FOLLOW-UP VISIT: CPT | Mod: S$GLB,,, | Performed by: PODIATRIST

## 2018-09-11 NOTE — PROGRESS NOTES
Subjective:      Patient ID: Billy Yatesn is a 53 y.o. male.    Chief Complaint: Diabetes Mellitus (06/01/2018 dr henry) and Diabetic Foot Exam    Pt presents to clinic s/p I&D x3 and removal of foreign object (glass) Pt has been d/rosa from [facility] and has HH nursing doing dressing changes. Pt is no longer on wound vac. Pt denies any nausea or vomiting    8/21/2018: Pt states that he has also been seeing a wound care doctor at [facility] that advised him to soak the wound. Pt was advised that he cannot have 2 doctors treating the same wound with 2 different treatment plans. Pt states that he would prefer [facility].   8/28/2018: Culture positive for anaerobes, ID present on today's visit, flagyl prescribed.     9-11 Wound showing good healing, no new complaints      Review of Systems   Constitution: Negative for chills, fever and malaise/fatigue.   HENT: Negative for hearing loss.    Cardiovascular: Negative for claudication and leg swelling.   Respiratory: Negative for shortness of breath.    Skin: Positive for dry skin and poor wound healing. Negative for flushing and rash.   Musculoskeletal: Negative for joint pain and myalgias.   Neurological: Negative for loss of balance, numbness, paresthesias and sensory change.   Psychiatric/Behavioral: Negative for altered mental status.           Objective:      Physical Exam   Constitutional: He appears well-developed and well-nourished. He is cooperative.   Cardiovascular:   Pulses:       Dorsalis pedis pulses are 0 on the right side, and 0 on the left side.        Posterior tibial pulses are 1+ on the right side, and 1+ on the left side.   Non-pitting LE edema noted b/L   Musculoskeletal:        Right ankle: He exhibits decreased range of motion and abnormal pulse. Achilles tendon exhibits normal Kimble's test results.        Left ankle: He exhibits decreased range of motion and abnormal pulse. Achilles tendon exhibits normal Kimble's test results.         Right foot: There is decreased range of motion.        Left foot: There is decreased range of motion.   Feet:   Right Foot:   Protective Sensation: 5 sites tested. 2 sites sensed.   Left Foot:   Protective Sensation: 5 sites tested. 2 sites sensed.   Neurological: He is alert.   diminished sensation noted to b/L lower extremities   Skin: Skin is dry. Capillary refill takes more than 3 seconds.   Right foot incision from dorsal foot through 2nd interspace extending to plantar foot.   Incision is closed, except for dorsal foot where it is open with a depth of 0.2cm. No malodor noted.    Psychiatric: His behavior is normal.   Vitals reviewed.                Assessment:       No diagnosis found.      Plan:       There are no diagnoses linked to this encounter.  I counseled the patient on his conditions, their implications and medical management.    With patientss permission wound was excisionally debrided to level of subcutaneous, biofilm, slough was debrided.  Post debridement measurements were 1.4 x 0.3cm    Incision site cleansed with betadine, endoform applied to incision. Pt advised to keep the foot dry, and not to soak it or get the dressing wet.      Football dressing applied to pts right foot by Dhara Guerra MA under my direct supervision. Pt tolerated dressing well.   RTC in 1 weeks or sooner if any new pedal problems should arise or if condition worsens.     Assisted by Mickey Quiñonez, [id number]    .

## 2018-09-18 ENCOUNTER — OFFICE VISIT (OUTPATIENT)
Dept: PODIATRY | Facility: CLINIC | Age: 53
End: 2018-09-18
Payer: COMMERCIAL

## 2018-09-18 VITALS
SYSTOLIC BLOOD PRESSURE: 172 MMHG | WEIGHT: 277 LBS | HEART RATE: 58 BPM | BODY MASS INDEX: 36.71 KG/M2 | DIASTOLIC BLOOD PRESSURE: 96 MMHG | HEIGHT: 73 IN

## 2018-09-18 DIAGNOSIS — Z98.890 POST-OPERATIVE STATE: Primary | ICD-10-CM

## 2018-09-18 PROCEDURE — 99999 PR PBB SHADOW E&M-EST. PATIENT-LVL IV: CPT | Mod: PBBFAC,,, | Performed by: PODIATRIST

## 2018-09-18 PROCEDURE — 99024 POSTOP FOLLOW-UP VISIT: CPT | Mod: S$GLB,,, | Performed by: PODIATRIST

## 2018-09-24 NOTE — PROGRESS NOTES
Subjective:      Patient ID: Billy Yatesn is a 53 y.o. male.    Chief Complaint: Post-op Evaluation    Pt presents to clinic s/p I&D x3 and removal of foreign object (glass) Pt has been d[facility]a from [facility] and has  nursing doing dressing changes. Pt is no longer on wound vac. Pt denies any nausea or vomiting    8/21/2018: Pt states that he has also been seeing a wound care doctor at [facility] that advised him to soak the wound. Pt was advised that he cannot have 2 doctors treating the same wound with 2 different treatment plans. Pt states that he would prefer [facility].   8/28/2018: Culture positive for anaerobes, ID present on today's visit, flagyl prescribed.       Review of Systems   Constitution: Negative for chills, fever and malaise/fatigue.   HENT: Negative for hearing loss.    Cardiovascular: Positive for leg swelling. Negative for claudication.   Respiratory: Negative for shortness of breath.    Skin: Positive for dry skin and poor wound healing. Negative for flushing and rash.   Musculoskeletal: Negative for joint pain and myalgias.   Neurological: Negative for loss of balance, numbness, paresthesias and sensory change.   Psychiatric/Behavioral: Negative for altered mental status.           Objective:      Physical Exam   Constitutional: He appears well-developed and well-nourished. He is cooperative.   Cardiovascular:   Pulses:       Dorsalis pedis pulses are 0 on the right side, and 0 on the left side.        Posterior tibial pulses are 1+ on the right side, and 1+ on the left side.   Non-pitting LE edema noted b/L   Musculoskeletal:        Right ankle: He exhibits decreased range of motion and abnormal pulse. Achilles tendon exhibits normal Kimble's test results.        Left ankle: He exhibits decreased range of motion and abnormal pulse. Achilles tendon exhibits normal Kimble's test results.        Right foot: There is decreased range of motion.        Left foot: There is decreased  range of motion.   Feet:   Right Foot:   Protective Sensation: 5 sites tested. 2 sites sensed.   Left Foot:   Protective Sensation: 5 sites tested. 2 sites sensed.   Neurological: He is alert.   diminished sensation noted to b/L lower extremities   Skin: Skin is dry. Capillary refill takes more than 3 seconds.   Right foot incision from dorsal foot through 2nd interspace extending to plantar foot.   Measurements  2.5x 2.5x 0.2cm fiborgranular base, no purulence or SOI noted.    Psychiatric: His behavior is normal.   Vitals reviewed.                Assessment:       Encounter Diagnosis   Name Primary?    Post-operative state - Right Foot Yes         Plan:       Billy was seen today for post-op evaluation.    Diagnoses and all orders for this visit:    Post-operative state - Right Foot      I counseled the patient on his conditions, their implications and medical management.      Incision site cleansed with betadine, iodosorb applied to incision. Pt advised to keep the foot dry, and not to soak it or get the dressing wet.   Football dressing applied to pts right foot by Dhara Guerra MA under my direct supervision. Pt tolerated dressing well.   RTC in 1 weeks or sooner if any new pedal problems should arise or if condition worsens.       .

## 2018-09-25 ENCOUNTER — OFFICE VISIT (OUTPATIENT)
Dept: PODIATRY | Facility: CLINIC | Age: 53
End: 2018-09-25
Payer: COMMERCIAL

## 2018-09-25 VITALS
HEART RATE: 55 BPM | SYSTOLIC BLOOD PRESSURE: 129 MMHG | DIASTOLIC BLOOD PRESSURE: 80 MMHG | BODY MASS INDEX: 36.93 KG/M2 | HEIGHT: 73 IN | WEIGHT: 278.69 LBS

## 2018-09-25 DIAGNOSIS — Z98.890 POST-OPERATIVE STATE: Primary | ICD-10-CM

## 2018-09-25 PROCEDURE — 99999 PR PBB SHADOW E&M-EST. PATIENT-LVL III: CPT | Mod: PBBFAC,,, | Performed by: PODIATRIST

## 2018-09-25 PROCEDURE — 99024 POSTOP FOLLOW-UP VISIT: CPT | Mod: S$GLB,,, | Performed by: PODIATRIST

## 2018-10-02 ENCOUNTER — OFFICE VISIT (OUTPATIENT)
Dept: PODIATRY | Facility: CLINIC | Age: 53
End: 2018-10-02

## 2018-10-02 VITALS
HEIGHT: 73 IN | DIASTOLIC BLOOD PRESSURE: 84 MMHG | WEIGHT: 273.56 LBS | BODY MASS INDEX: 36.25 KG/M2 | HEART RATE: 77 BPM | SYSTOLIC BLOOD PRESSURE: 137 MMHG

## 2018-10-02 DIAGNOSIS — L97.512 SKIN ULCER OF RIGHT FOOT WITH FAT LAYER EXPOSED: Primary | ICD-10-CM

## 2018-10-02 PROCEDURE — 99213 OFFICE O/P EST LOW 20 MIN: CPT | Mod: PBBFAC | Performed by: PODIATRIST

## 2018-10-02 PROCEDURE — 99213 OFFICE O/P EST LOW 20 MIN: CPT | Mod: S$PBB,,, | Performed by: PODIATRIST

## 2018-10-02 PROCEDURE — 11042 DBRDMT SUBQ TIS 1ST 20SQCM/<: CPT | Mod: PBBFAC | Performed by: PODIATRIST

## 2018-10-02 PROCEDURE — 11042 DBRDMT SUBQ TIS 1ST 20SQCM/<: CPT | Mod: S$PBB,,, | Performed by: PODIATRIST

## 2018-10-02 PROCEDURE — 99999 PR PBB SHADOW E&M-EST. PATIENT-LVL III: CPT | Mod: PBBFAC,,, | Performed by: PODIATRIST

## 2018-10-02 NOTE — PROGRESS NOTES
Subjective:      Patient ID: Billy Yatesn is a 53 y.o. male.    Chief Complaint: Follow-up and Post-op Evaluation (right ft./ 6/1/18 Dr. Boswell)    Pt presents to clinic s/p I&D x3 and removal of foreign object (glass) Pt has been d/rosa from [facility] and has HH nursing doing dressing changes. Pt is no longer on wound vac. Pt denies any nausea or vomiting    8/21/2018: Pt states that he has also been seeing a wound care doctor at [facility] that advised him to soak the wound. Pt was advised that he cannot have 2 doctors treating the same wound with 2 different treatment plans. Pt states that he would prefer [facility].   8/28/2018: Culture positive for anaerobes, ID present on today's visit, flagyl prescribed.       Review of Systems   Constitution: Negative for chills, fever and malaise/fatigue.   HENT: Negative for hearing loss.    Cardiovascular: Positive for leg swelling. Negative for claudication.   Respiratory: Negative for shortness of breath.    Skin: Positive for dry skin and poor wound healing. Negative for flushing and rash.   Musculoskeletal: Negative for joint pain and myalgias.   Neurological: Negative for loss of balance, numbness, paresthesias and sensory change.   Psychiatric/Behavioral: Negative for altered mental status.           Objective:      Physical Exam   Constitutional: He appears well-developed and well-nourished. He is cooperative.   Cardiovascular:   Pulses:       Dorsalis pedis pulses are 0 on the right side, and 0 on the left side.        Posterior tibial pulses are 1+ on the right side, and 1+ on the left side.   Non-pitting LE edema noted b/L   Musculoskeletal:        Right ankle: He exhibits decreased range of motion and abnormal pulse. Achilles tendon exhibits normal Kimble's test results.        Left ankle: He exhibits decreased range of motion and abnormal pulse. Achilles tendon exhibits normal Kimble's test results.        Right foot: There is decreased range of  motion.        Left foot: There is decreased range of motion.   Feet:   Right Foot:   Protective Sensation: 5 sites tested. 2 sites sensed.   Left Foot:   Protective Sensation: 5 sites tested. 2 sites sensed.   Neurological: He is alert.   diminished sensation noted to b/L lower extremities   Skin: Skin is dry. Capillary refill takes more than 3 seconds.   Right foot incision from dorsal foot through 2nd interspace extending to plantar foot.   Measurements  1.0x 0.3x 0.2cm fiborgranular base, no purulence or SOI noted.    Psychiatric: His behavior is normal.   Vitals reviewed.                Assessment:       Encounter Diagnosis   Name Primary?    Post-operative state - Right Foot Yes         Plan:       Billy was seen today for follow-up and post-op evaluation.    Diagnoses and all orders for this visit:    Post-operative state - Right Foot      I counseled the patient on his conditions, their implications and medical management.      Incision site cleansed with betadine, iodosorb applied to incision. Pt advised to keep the foot dry, and not to soak it or get the dressing wet.   Football dressing applied to pts right foot by Dhara Guerra MA under my direct supervision. Pt tolerated dressing well.   RTC in 1 weeks or sooner if any new pedal problems should arise or if condition worsens.       .

## 2018-10-09 ENCOUNTER — OFFICE VISIT (OUTPATIENT)
Dept: PODIATRY | Facility: CLINIC | Age: 53
End: 2018-10-09
Payer: OTHER MISCELLANEOUS

## 2018-10-09 VITALS
HEART RATE: 57 BPM | HEIGHT: 72 IN | BODY MASS INDEX: 37.32 KG/M2 | WEIGHT: 275.56 LBS | SYSTOLIC BLOOD PRESSURE: 147 MMHG | DIASTOLIC BLOOD PRESSURE: 92 MMHG

## 2018-10-09 DIAGNOSIS — L97.511 SKIN ULCER OF RIGHT FOOT, LIMITED TO BREAKDOWN OF SKIN: Primary | ICD-10-CM

## 2018-10-09 PROCEDURE — 99999 PR PBB SHADOW E&M-EST. PATIENT-LVL III: CPT | Mod: PBBFAC,,, | Performed by: PODIATRIST

## 2018-10-09 PROCEDURE — 97597 DBRDMT OPN WND 1ST 20 CM/<: CPT | Mod: S$GLB,,, | Performed by: PODIATRIST

## 2018-10-09 PROCEDURE — 99499 UNLISTED E&M SERVICE: CPT | Mod: S$GLB,,, | Performed by: PODIATRIST

## 2018-10-09 NOTE — PROGRESS NOTES
Subjective:      Patient ID: Billy Yatesn is a 53 y.o. male.    Chief Complaint: Post-op Evaluation    Pt presents to clinic s/p I&D x3 and removal of foreign object (glass) Pt has been d/rosa from [facility] and has  nursing doing dressing changes. Pt is no longer on wound vac. Pt denies any nausea or vomiting    8/21/2018: Pt states that he has also been seeing a wound care doctor at [facility] that advised him to soak the wound. Pt was advised that he cannot have 2 doctors treating the same wound with 2 different treatment plans. Pt states that he would prefer [facility].   8/28/2018: Culture positive for anaerobes, ID present on today's visit, flagyl prescribed.       Review of Systems   Constitution: Negative for chills, fever and malaise/fatigue.   HENT: Negative for hearing loss.    Cardiovascular: Positive for leg swelling. Negative for claudication.   Respiratory: Negative for shortness of breath.    Skin: Positive for dry skin and poor wound healing. Negative for flushing and rash.   Musculoskeletal: Negative for joint pain and myalgias.   Neurological: Negative for loss of balance, numbness, paresthesias and sensory change.   Psychiatric/Behavioral: Negative for altered mental status.           Objective:      Physical Exam   Constitutional: He appears well-developed and well-nourished. He is cooperative.   Cardiovascular:   Pulses:       Dorsalis pedis pulses are 0 on the right side, and 0 on the left side.        Posterior tibial pulses are 1+ on the right side, and 1+ on the left side.   Non-pitting LE edema noted b/L   Musculoskeletal:        Right ankle: He exhibits decreased range of motion and abnormal pulse. Achilles tendon exhibits normal Kimble's test results.        Left ankle: He exhibits decreased range of motion and abnormal pulse. Achilles tendon exhibits normal Kimble's test results.        Right foot: There is decreased range of motion.        Left foot: There is decreased  range of motion.   Feet:   Right Foot:   Protective Sensation: 5 sites tested. 2 sites sensed.   Left Foot:   Protective Sensation: 5 sites tested. 2 sites sensed.   Neurological: He is alert.   diminished sensation noted to b/L lower extremities   Skin: Skin is dry. Capillary refill takes more than 3 seconds.   Right foot wound    Measurements  0.7 0.3x 0.2cm fiborgranular base, no purulence or SOI noted.    Psychiatric: His behavior is normal.   Vitals reviewed.                Assessment:       Encounter Diagnosis   Name Primary?    Skin ulcer of right foot with fat layer exposed Yes         Plan:       Billy was seen today for post-op evaluation.    Diagnoses and all orders for this visit:    Skin ulcer of right foot with fat layer exposed      I counseled the patient on his conditions, their implications and medical management.      Ulceration on right foot debrided through sub-q tissue using an tissue nipper. Ulceration debrided down to healthy tissue. Pt tolerated debridement well.   Football dressing applied to pts right foot by Dhara Guerra MA under my direct supervision. Pt tolerated dressing well.   RTC in 2 weeks or sooner if any new pedal problems should arise or if condition worsens.         .

## 2018-10-16 NOTE — PROGRESS NOTES
Subjective:      Patient ID: Billy Sheffield Miguel is a 53 y.o. male.    Chief Complaint: Diabetes Mellitus (xfrvlfdd36/01/2018) and Diabetic Foot Exam    Pt presents to clinic s/p I&D x3 and removal of foreign object (glass) Pt has been d/rosa from [facility] and has HH nursing doing dressing changes. Pt is no longer on wound vac. Pt denies any nausea or vomiting    8/21/2018: Pt states that he has also been seeing a wound care doctor at [facility] that advised him to soak the wound. Pt was advised that he cannot have 2 doctors treating the same wound with 2 different treatment plans. Pt states that he would prefer [facility].   8/28/2018: Culture positive for anaerobes, ID present on today's visit, flagyl prescribed.       Review of Systems   Constitution: Negative for chills, fever and malaise/fatigue.   HENT: Negative for hearing loss.    Cardiovascular: Positive for leg swelling. Negative for claudication.   Respiratory: Negative for shortness of breath.    Skin: Positive for dry skin and poor wound healing. Negative for flushing and rash.   Musculoskeletal: Negative for joint pain and myalgias.   Neurological: Negative for loss of balance, numbness, paresthesias and sensory change.   Psychiatric/Behavioral: Negative for altered mental status.           Objective:      Physical Exam   Constitutional: He appears well-developed and well-nourished. He is cooperative.   Cardiovascular:   Pulses:       Dorsalis pedis pulses are 0 on the right side, and 0 on the left side.        Posterior tibial pulses are 1+ on the right side, and 1+ on the left side.   Non-pitting LE edema noted b/L   Musculoskeletal:        Right ankle: He exhibits decreased range of motion and abnormal pulse. Achilles tendon exhibits normal Kimble's test results.        Left ankle: He exhibits decreased range of motion and abnormal pulse. Achilles tendon exhibits normal Kimble's test results.        Right foot: There is decreased range of  motion.        Left foot: There is decreased range of motion.   Feet:   Right Foot:   Protective Sensation: 5 sites tested. 2 sites sensed.   Left Foot:   Protective Sensation: 5 sites tested. 2 sites sensed.   Neurological: He is alert.   diminished sensation noted to b/L lower extremities   Skin: Skin is dry. Capillary refill takes more than 3 seconds.   Right foot wound    Measurements  0.7 0.2x 0.1cm granular base, no purulence or SOI noted.    Psychiatric: His behavior is normal.   Vitals reviewed.                Assessment:       Encounter Diagnosis   Name Primary?    Skin ulcer of right foot, limited to breakdown of skin Yes         Plan:       Billy was seen today for diabetes mellitus and diabetic foot exam.    Diagnoses and all orders for this visit:    Skin ulcer of right foot, limited to breakdown of skin      I counseled the patient on his conditions, their implications and medical management.      Ulceration on right foot debrided through dermis/epidermis tissue using an tissue nipper. Ulceration debrided down to healthy tissue. Pt tolerated debridement well. Iodosorb applied to wound  Football dressing applied to pts right foot by Dhara Guerra MA under my direct supervision. Pt tolerated dressing well.   RTC in 2 weeks or sooner if any new pedal problems should arise or if condition worsens.         .

## 2018-10-22 ENCOUNTER — OFFICE VISIT (OUTPATIENT)
Dept: PODIATRY | Facility: CLINIC | Age: 53
End: 2018-10-22
Payer: OTHER MISCELLANEOUS

## 2018-10-22 VITALS
WEIGHT: 273.38 LBS | SYSTOLIC BLOOD PRESSURE: 133 MMHG | HEART RATE: 54 BPM | HEIGHT: 72 IN | DIASTOLIC BLOOD PRESSURE: 82 MMHG | BODY MASS INDEX: 37.03 KG/M2

## 2018-10-22 DIAGNOSIS — T81.89XS DELAYED SURGICAL WOUND HEALING, SEQUELA: Primary | ICD-10-CM

## 2018-10-22 PROCEDURE — 99999 PR PBB SHADOW E&M-EST. PATIENT-LVL III: CPT | Mod: PBBFAC,,, | Performed by: PODIATRIST

## 2018-10-22 PROCEDURE — 99213 OFFICE O/P EST LOW 20 MIN: CPT | Mod: S$GLB,,, | Performed by: PODIATRIST

## 2018-10-29 ENCOUNTER — OFFICE VISIT (OUTPATIENT)
Dept: PODIATRY | Facility: CLINIC | Age: 53
End: 2018-10-29
Payer: OTHER MISCELLANEOUS

## 2018-10-29 VITALS
SYSTOLIC BLOOD PRESSURE: 128 MMHG | HEIGHT: 71 IN | HEART RATE: 55 BPM | DIASTOLIC BLOOD PRESSURE: 79 MMHG | WEIGHT: 273 LBS | BODY MASS INDEX: 38.22 KG/M2

## 2018-10-29 DIAGNOSIS — L97.511 SKIN ULCER OF RIGHT FOOT, LIMITED TO BREAKDOWN OF SKIN: ICD-10-CM

## 2018-10-29 DIAGNOSIS — T81.89XS DELAYED SURGICAL WOUND HEALING, SEQUELA: Primary | ICD-10-CM

## 2018-10-29 PROCEDURE — 99213 OFFICE O/P EST LOW 20 MIN: CPT | Mod: S$GLB,,, | Performed by: PODIATRIST

## 2018-10-29 PROCEDURE — 99999 PR PBB SHADOW E&M-EST. PATIENT-LVL III: CPT | Mod: PBBFAC,,, | Performed by: PODIATRIST

## 2018-10-29 NOTE — PROGRESS NOTES
Subjective:      Patient ID: Billy Sheffield Miguel is a 53 y.o. male.    Chief Complaint: Diabetes Mellitus (06/01/2018 dr henry ) and Diabetic Foot Exam    Pt presents to clinic s/p I&D x3 and removal of foreign object (glass) Pt has been d/rosa from [facility] and has HH nursing doing dressing changes. Pt is no longer on wound vac. Pt denies any nausea or vomiting    8/21/2018: Pt states that he has also been seeing a wound care doctor at [facility] that advised him to soak the wound. Pt was advised that he cannot have 2 doctors treating the same wound with 2 different treatment plans. Pt states that he would prefer [facility].   8/28/2018: Culture positive for anaerobes, ID present on today's visit, flagyl prescribed.   10/22/2018 Wound appears to be healed. No new issues  10/29/18: Wound with overlying HPK, no SOI, pt with no pedal complaints     Review of Systems   Constitution: Negative for chills, fever and malaise/fatigue.   HENT: Negative for hearing loss.    Cardiovascular: Positive for leg swelling. Negative for claudication.   Respiratory: Negative for shortness of breath.    Skin: Positive for dry skin and poor wound healing. Negative for flushing and rash.   Musculoskeletal: Negative for joint pain and myalgias.   Neurological: Negative for loss of balance, numbness, paresthesias and sensory change.   Psychiatric/Behavioral: Negative for altered mental status.           Objective:      Physical Exam   Constitutional: He appears well-developed and well-nourished. He is cooperative.   Cardiovascular:   Pulses:       Dorsalis pedis pulses are 0 on the right side, and 0 on the left side.        Posterior tibial pulses are 1+ on the right side, and 1+ on the left side.   Non-pitting LE edema noted b/L   Musculoskeletal:        Right ankle: He exhibits decreased range of motion and abnormal pulse. Achilles tendon exhibits normal Kimble's test results.        Left ankle: He exhibits decreased range of  motion and abnormal pulse. Achilles tendon exhibits normal Kimble's test results.        Right foot: There is decreased range of motion.        Left foot: There is decreased range of motion.   Feet:   Right Foot:   Protective Sensation: 5 sites tested. 2 sites sensed.   Left Foot:   Protective Sensation: 5 sites tested. 2 sites sensed.   Neurological: He is alert.   diminished sensation noted to b/L lower extremities   Skin: Skin is dry. Capillary refill takes more than 3 seconds.   Right plantar foot wound measuring 0.5x0.5x0.1 cm with granular base and HPK borders, no drainage or periwound SOI noted, stable.        Psychiatric: His behavior is normal.   Vitals reviewed.                Assessment:       Encounter Diagnoses   Name Primary?    Delayed surgical wound healing, sequela Yes    Follow-up examination following surgery     Skin ulcer of right foot, limited to breakdown of skin     Postoperative wound dehiscence, sequela          Plan:       Billy was seen today for diabetes mellitus and diabetic foot exam.    Diagnoses and all orders for this visit:    Delayed surgical wound healing, sequela    Follow-up examination following surgery    Skin ulcer of right foot, limited to breakdown of skin    Postoperative wound dehiscence, sequela      I counseled the patient on his conditions, their implications and medical management.      Wound dressed with betadine and aquacel border and ACE.   Pt advised to change dressings daily NS check foot daily for any signs of open wounds.   Pt advised to keep the area padded with foam and ace bandage.   RTC in 2 weeks or sooner if any new pedal problems should arise or if condition worsens.     Lyudmila Yu, PGY3      .

## 2018-10-29 NOTE — PROGRESS NOTES
Subjective:      Patient ID: Billy Sheffield Miguel is a 53 y.o. male.    Chief Complaint: Diabetes Mellitus (dariel brown06/01/2018) and Diabetic Foot Exam    Pt presents to clinic s/p I&D x3 and removal of foreign object (glass) Pt has been d/rosa from [facility] and has HH nursing doing dressing changes. Pt is no longer on wound vac. Pt denies any nausea or vomiting    8/21/2018: Pt states that he has also been seeing a wound care doctor at [facility] that advised him to soak the wound. Pt was advised that he cannot have 2 doctors treating the same wound with 2 different treatment plans. Pt states that he would prefer [facility].   8/28/2018: Culture positive for anaerobes, ID present on today's visit, flagyl prescribed.   10/22/2018 Wound appears to be healed. No new issues    Review of Systems   Constitution: Negative for chills, fever and malaise/fatigue.   HENT: Negative for hearing loss.    Cardiovascular: Positive for leg swelling. Negative for claudication.   Respiratory: Negative for shortness of breath.    Skin: Positive for dry skin and poor wound healing. Negative for flushing and rash.   Musculoskeletal: Negative for joint pain and myalgias.   Neurological: Negative for loss of balance, numbness, paresthesias and sensory change.   Psychiatric/Behavioral: Negative for altered mental status.           Objective:      Physical Exam   Constitutional: He appears well-developed and well-nourished. He is cooperative.   Cardiovascular:   Pulses:       Dorsalis pedis pulses are 0 on the right side, and 0 on the left side.        Posterior tibial pulses are 1+ on the right side, and 1+ on the left side.   Non-pitting LE edema noted b/L   Musculoskeletal:        Right ankle: He exhibits decreased range of motion and abnormal pulse. Achilles tendon exhibits normal Kimble's test results.        Left ankle: He exhibits decreased range of motion and abnormal pulse. Achilles tendon exhibits normal Kimble's test  results.        Right foot: There is decreased range of motion.        Left foot: There is decreased range of motion.   Feet:   Right Foot:   Protective Sensation: 5 sites tested. 2 sites sensed.   Left Foot:   Protective Sensation: 5 sites tested. 2 sites sensed.   Neurological: He is alert.   diminished sensation noted to b/L lower extremities   Skin: Skin is dry. Capillary refill takes more than 3 seconds.   Right foot wound healed upon examination and debridement of HKT tissue. Thin, intact, pink skin noted.        Psychiatric: His behavior is normal.   Vitals reviewed.                Assessment:       Encounter Diagnosis   Name Primary?    Delayed surgical wound healing, sequela Yes         Plan:       Billy was seen today for diabetes mellitus and diabetic foot exam.    Diagnoses and all orders for this visit:    Delayed surgical wound healing, sequela      I counseled the patient on his conditions, their implications and medical management.      Pt advised that the area of previous ulceration is at high risk of re-opening.   Pt advised to check foot daily for any signs of open wounds.   Pt advised to keep the area padded with foam and ace bandage.   RTC in 2 weeks or sooner if any new pedal problems should arise or if condition worsens.       .

## 2018-11-05 ENCOUNTER — OFFICE VISIT (OUTPATIENT)
Dept: PODIATRY | Facility: CLINIC | Age: 53
End: 2018-11-05
Payer: OTHER MISCELLANEOUS

## 2018-11-05 VITALS
SYSTOLIC BLOOD PRESSURE: 147 MMHG | DIASTOLIC BLOOD PRESSURE: 87 MMHG | WEIGHT: 273 LBS | BODY MASS INDEX: 51.54 KG/M2 | HEIGHT: 61 IN | HEART RATE: 59 BPM

## 2018-11-05 DIAGNOSIS — L97.512 SKIN ULCER OF RIGHT FOOT WITH FAT LAYER EXPOSED: Primary | ICD-10-CM

## 2018-11-05 PROCEDURE — 99999 PR PBB SHADOW E&M-EST. PATIENT-LVL III: CPT | Mod: PBBFAC,,, | Performed by: PODIATRIST

## 2018-11-05 PROCEDURE — 99499 UNLISTED E&M SERVICE: CPT | Mod: S$GLB,,, | Performed by: PODIATRIST

## 2018-11-05 PROCEDURE — 11042 DBRDMT SUBQ TIS 1ST 20SQCM/<: CPT | Mod: S$GLB,,, | Performed by: PODIATRIST

## 2018-11-12 ENCOUNTER — OFFICE VISIT (OUTPATIENT)
Dept: PODIATRY | Facility: CLINIC | Age: 53
End: 2018-11-12
Payer: OTHER MISCELLANEOUS

## 2018-11-12 VITALS
DIASTOLIC BLOOD PRESSURE: 104 MMHG | WEIGHT: 273 LBS | SYSTOLIC BLOOD PRESSURE: 159 MMHG | BODY MASS INDEX: 51.54 KG/M2 | HEIGHT: 61 IN | HEART RATE: 68 BPM

## 2018-11-12 DIAGNOSIS — L97.511 SKIN ULCER OF RIGHT FOOT, LIMITED TO BREAKDOWN OF SKIN: Primary | ICD-10-CM

## 2018-11-12 PROCEDURE — 99499 UNLISTED E&M SERVICE: CPT | Mod: S$GLB,,, | Performed by: PODIATRIST

## 2018-11-12 PROCEDURE — 97597 DBRDMT OPN WND 1ST 20 CM/<: CPT | Mod: S$GLB,,, | Performed by: PODIATRIST

## 2018-11-12 PROCEDURE — 99999 PR PBB SHADOW E&M-EST. PATIENT-LVL III: CPT | Mod: PBBFAC,,, | Performed by: PODIATRIST

## 2018-11-13 NOTE — PROGRESS NOTES
Subjective:      Patient ID: Billy Sheffield Miguel is a 53 y.o. male.    Chief Complaint: Diabetes Mellitus (dr henry 06/01/2018) and Diabetic Foot Exam    Pt presents to clinic s/p I&D x3 and removal of foreign object (glass) Pt has been d/rosa from [facility] and has HH nursing doing dressing changes. Pt is no longer on wound vac. Pt denies any nausea or vomiting    8/21/2018: Pt states that he has also been seeing a wound care doctor at [facility] that advised him to soak the wound. Pt was advised that he cannot have 2 doctors treating the same wound with 2 different treatment plans. Pt states that he would prefer [facility].   8/28/2018: Culture positive for anaerobes, ID present on today's visit, flagyl prescribed.   10/22/2018 Wound appears to be healed. No new issues  10/29/18: Wound with overlying HPK, no SOI, pt with no pedal complaints   11/5/2018 plantar foot wound, no new issues      Review of Systems   Constitution: Negative for chills, fever and malaise/fatigue.   HENT: Negative for hearing loss.    Cardiovascular: Positive for leg swelling. Negative for claudication.   Respiratory: Negative for shortness of breath.    Skin: Positive for dry skin. Negative for flushing and rash.   Musculoskeletal: Negative for joint pain and myalgias.   Neurological: Negative for loss of balance, numbness, paresthesias and sensory change.   Psychiatric/Behavioral: Negative for altered mental status.           Objective:      Physical Exam   Constitutional: He appears well-developed and well-nourished. He is cooperative.   Cardiovascular:   Pulses:       Dorsalis pedis pulses are 0 on the right side, and 0 on the left side.        Posterior tibial pulses are 1+ on the right side, and 1+ on the left side.   Non-pitting LE edema noted b/L   Musculoskeletal:        Right ankle: He exhibits decreased range of motion and abnormal pulse. Achilles tendon exhibits normal Kimble's test results.        Left ankle: He exhibits  decreased range of motion and abnormal pulse. Achilles tendon exhibits normal Kimble's test results.        Right foot: There is decreased range of motion.        Left foot: There is decreased range of motion.   Feet:   Right Foot:   Protective Sensation: 5 sites tested. 2 sites sensed.   Left Foot:   Protective Sensation: 5 sites tested. 2 sites sensed.   Neurological: He is alert.   diminished sensation noted to b/L lower extremities   Skin: Skin is dry. Capillary refill takes more than 3 seconds.   Right dorsal foot, site of previous sx incision healed, hypertrophic.    Ulceration  Location: right plantar foot  Measurements: 2.4x 1.8x 0.2cm  Drainage: serous  Wound base: granular  SOI: none     Psychiatric: His behavior is normal.   Vitals reviewed.                Assessment:       No diagnosis found.      Plan:       There are no diagnoses linked to this encounter.  I counseled the patient on his conditions, their implications and medical management.      Ulceration on right plantar foot debrided through sub-q tissue using an tissue nipper. Ulceration debrided down to healthy tissue. Iodosorb applied Pt tolerated debridement well.   Football dressing applied to pts right  foot by Dhara Guerra MA under my direct supervision. Pt tolerated dressing well.   RTC in 1 week or sooner if any new pedal problems arise, any signs of infection occur, or if condition worsens.     .

## 2018-11-19 ENCOUNTER — OFFICE VISIT (OUTPATIENT)
Dept: PODIATRY | Facility: CLINIC | Age: 53
End: 2018-11-19
Payer: OTHER MISCELLANEOUS

## 2018-11-19 VITALS
HEART RATE: 65 BPM | HEIGHT: 61 IN | BODY MASS INDEX: 51.58 KG/M2 | SYSTOLIC BLOOD PRESSURE: 123 MMHG | DIASTOLIC BLOOD PRESSURE: 71 MMHG

## 2018-11-19 DIAGNOSIS — L97.511 SKIN ULCER OF RIGHT FOOT, LIMITED TO BREAKDOWN OF SKIN: Primary | ICD-10-CM

## 2018-11-19 PROCEDURE — 97597 DBRDMT OPN WND 1ST 20 CM/<: CPT | Mod: S$GLB,,, | Performed by: PODIATRIST

## 2018-11-19 PROCEDURE — 99999 PR PBB SHADOW E&M-EST. PATIENT-LVL III: CPT | Mod: PBBFAC,,, | Performed by: PODIATRIST

## 2018-11-19 PROCEDURE — 99499 UNLISTED E&M SERVICE: CPT | Mod: S$GLB,,, | Performed by: PODIATRIST

## 2018-11-20 NOTE — PROGRESS NOTES
Subjective:      Patient ID: Billy Sheffield Miguel is a 53 y.o. male.    Chief Complaint: Diabetes Mellitus (dr henry 06/01/2018) and Diabetic Foot Exam    Pt presents to clinic s/p I&D x3 and removal of foreign object (glass) Pt [facility] and has HH nursing doing dressing changes. Pt is no longer on wound vac. Pt denies any nausea or vomiting    8/21/2018: Pt states that he has also been seeing a wound care doctor at [facility] that advised him to soak the wound. Pt was advised that he cannot have 2 doctors treating the same wound with 2 different treatment plans. Pt states that he would prefer [facility].   8/28/2018: Culture positive for anaerobes, ID present on today's visit, flagyl prescribed.   10/22/2018 Wound appears to be healed. No new issues  10/29/18: Wound with overlying HPK, no SOI, pt with no pedal complaints   11/5/2018 plantar foot wound, no new issues  11/12/2018 plantar wound, no new issues      Review of Systems   Constitution: Negative for chills, fever and malaise/fatigue.   HENT: Negative for hearing loss.    Cardiovascular: Positive for leg swelling. Negative for claudication.   Respiratory: Negative for shortness of breath.    Skin: Positive for dry skin. Negative for flushing and rash.   Musculoskeletal: Negative for joint pain and myalgias.   Neurological: Positive for sensory change. Negative for loss of balance, numbness and paresthesias.   Psychiatric/Behavioral: Negative for altered mental status.           Objective:      Physical Exam   Constitutional: He appears well-developed and well-nourished. He is cooperative.   Cardiovascular:   Pulses:       Dorsalis pedis pulses are 0 on the right side, and 0 on the left side.        Posterior tibial pulses are 1+ on the right side, and 1+ on the left side.   Non-pitting LE edema noted b/L   Musculoskeletal:        Right ankle: He exhibits decreased range of motion and abnormal pulse. Achilles tendon exhibits normal  Kimble's test results.        Left ankle: He exhibits decreased range of motion and abnormal pulse. Achilles tendon exhibits normal Kimble's test results.        Right foot: There is decreased range of motion.        Left foot: There is decreased range of motion.   Feet:   Right Foot:   Protective Sensation: 5 sites tested. 2 sites sensed.   Left Foot:   Protective Sensation: 5 sites tested. 2 sites sensed.   Neurological: He is alert.   diminished sensation noted to b/L lower extremities   Skin: Skin is dry. Capillary refill takes more than 3 seconds.   Right dorsal foot, site of previous sx incision healed, hypertrophic.    Ulceration  Location: right plantar foot  Measurements: 0.5x 0.5x 0.1cm  Drainage: serous  Wound base: granular  SOI: none     Psychiatric: His behavior is normal.   Vitals reviewed.                Assessment:       Encounter Diagnosis   Name Primary?    Skin ulcer of right foot, limited to breakdown of skin Yes         Plan:       Billy was seen today for diabetes mellitus and diabetic foot exam.    Diagnoses and all orders for this visit:    Skin ulcer of right foot, limited to breakdown of skin      I counseled the patient on his conditions, their implications and medical management.      Ulceration on right plantar foot debrided through dermis/epidermis tissue using an tissue nipper. Ulceration debrided down to healthy tissue. Iodosorb applied Pt tolerated debridement well.   Football dressing applied to pts right  foot by Dhara Guerra MA under my direct supervision. Pt tolerated dressing well.   RTC in 1 week or sooner if any new pedal problems arise, any signs of infection occur, or if condition worsens.     .

## 2018-11-26 ENCOUNTER — OFFICE VISIT (OUTPATIENT)
Dept: PODIATRY | Facility: CLINIC | Age: 53
End: 2018-11-26
Payer: OTHER MISCELLANEOUS

## 2018-11-26 VITALS
HEART RATE: 62 BPM | BODY MASS INDEX: 51.54 KG/M2 | SYSTOLIC BLOOD PRESSURE: 136 MMHG | WEIGHT: 273 LBS | DIASTOLIC BLOOD PRESSURE: 85 MMHG | HEIGHT: 61 IN

## 2018-11-26 DIAGNOSIS — Z87.2 HEALED FOOT ULCER: Primary | ICD-10-CM

## 2018-11-26 PROCEDURE — 99999 PR PBB SHADOW E&M-EST. PATIENT-LVL III: CPT | Mod: PBBFAC,,, | Performed by: PODIATRIST

## 2018-11-26 PROCEDURE — 99213 OFFICE O/P EST LOW 20 MIN: CPT | Mod: S$GLB,,, | Performed by: PODIATRIST

## 2018-11-26 NOTE — PROGRESS NOTES
Subjective:      Patient ID: Billy Yatesn is a 53 y.o. male.    Chief Complaint: Foot Problem; Foot Ulcer; Follow-up; Wound Care; and Dressing Change    Pt presents to clinic s/p I&D x3 and removal of foreign object (glass) Pt has been d/[facility]a from [facility] and has HH nursing doing dressing changes. Pt is no longer on wound vac. Pt denies any nausea or vomiting    8/21/2018: Pt states that he has also been seeing a wound care doctor at [facility] that advised him to soak the wound. Pt was advised that he cannot have 2 doctors treating the same wound with 2 different treatment plans. Pt states that he would prefer [facility].   8/28/2018: Culture positive for anaerobes, ID present on today's visit, flagyl prescribed.   10/22/2018 Wound appears to be healed. No new issues  10/29/18: Wound with overlying HPK, no SOI, pt with no pedal complaints   11/5/2018 plantar foot wound, no new issues  11/12/2018 plantar wound, no new issues  11/19/2018 plantar wound still open    Review of Systems   Constitution: Negative for chills, fever and malaise/fatigue.   HENT: Negative for hearing loss.    Cardiovascular: Positive for leg swelling. Negative for claudication.   Respiratory: Negative for shortness of breath.    Skin: Positive for dry skin. Negative for flushing and rash.   Musculoskeletal: Negative for joint pain and myalgias.   Neurological: Positive for sensory change. Negative for loss of balance, numbness and paresthesias.   Psychiatric/Behavioral: Negative for altered mental status.           Objective:      Physical Exam   Constitutional: He appears well-developed and well-nourished. He is cooperative.   Cardiovascular:   Pulses:       Dorsalis pedis pulses are 0 on the right side, and 0 on the left side.        Posterior tibial pulses are 1+ on the right side, and 1+ on the left side.   Non-pitting LE edema noted b/L   Musculoskeletal:        Right ankle: He exhibits decreased range of motion and abnormal  pulse. Achilles tendon exhibits normal Kimble's test results.        Left ankle: He exhibits decreased range of motion and abnormal pulse. Achilles tendon exhibits normal Kimble's test results.        Right foot: There is decreased range of motion.        Left foot: There is decreased range of motion.   Feet:   Right Foot:   Protective Sensation: 5 sites tested. 2 sites sensed.   Left Foot:   Protective Sensation: 5 sites tested. 2 sites sensed.   Neurological: He is alert.   diminished sensation noted to b/L lower extremities   Skin: Skin is dry. Capillary refill takes more than 3 seconds.   Right dorsal foot, site of previous sx incision healed, hypertrophic.    Ulceration  Location: right plantar foot  Measurements: 0.5x 0.3x 0.1cm  Drainage: serous  Wound base: granular  SOI: none     Psychiatric: His behavior is normal.   Vitals reviewed.                Assessment:       Encounter Diagnosis   Name Primary?    Skin ulcer of right foot, limited to breakdown of skin Yes         Plan:       Billy was seen today for foot problem, foot ulcer, follow-up, wound care and dressing change.    Diagnoses and all orders for this visit:    Skin ulcer of right foot, limited to breakdown of skin      I counseled the patient on his conditions, their implications and medical management.      Ulceration on right plantar foot debrided through dermis/epidermis tissue using an tissue nipper. Ulceration debrided down to healthy tissue. Iodosorb applied Pt tolerated debridement well.   Football dressing applied to pts right  foot by Dhara Guerra MA under my direct supervision. Pt tolerated dressing well.   RTC in 1 week or sooner if any new pedal problems arise, any signs of infection occur, or if condition worsens.     .

## 2018-12-03 ENCOUNTER — OFFICE VISIT (OUTPATIENT)
Dept: PODIATRY | Facility: CLINIC | Age: 53
End: 2018-12-03
Payer: OTHER MISCELLANEOUS

## 2018-12-03 VITALS
HEART RATE: 56 BPM | DIASTOLIC BLOOD PRESSURE: 85 MMHG | BODY MASS INDEX: 51.25 KG/M2 | HEIGHT: 61 IN | SYSTOLIC BLOOD PRESSURE: 141 MMHG

## 2018-12-03 DIAGNOSIS — Z87.2 HEALED FOOT ULCER: Primary | ICD-10-CM

## 2018-12-03 PROCEDURE — 99999 PR PBB SHADOW E&M-EST. PATIENT-LVL III: CPT | Mod: PBBFAC,,, | Performed by: PODIATRIST

## 2018-12-03 PROCEDURE — 99213 OFFICE O/P EST LOW 20 MIN: CPT | Mod: S$GLB,,, | Performed by: PODIATRIST

## 2018-12-04 NOTE — PROGRESS NOTES
Subjective:      Patient ID: Billy Sheffield Miguel is a 53 y.o. male.    Chief Complaint: Diabetes Mellitus (dr henry 06/01/2018) and Diabetic Foot Exam    Pt presents to clinic s/p I&D x3 and removal of foreign object (glass) Pt has been [facility]a from [facility] and has HH nursing doing dressing changes. Pt is no longer on wound vac. Pt denies any nausea or vomiting    8/21/2018: Pt states that he has also been seeing a wound care doctor at [facility] that advised him to soak the wound. Pt was advised that he cannot have 2 doctors treating the same wound with 2 different treatment plans. Pt states that he would prefer [facility].   8/28/2018: Culture positive for anaerobes, ID present on today's visit, flagyl prescribed.   10/22/2018 Wound appears to be healed. No new issues  10/29/18: Wound with overlying HPK, no SOI, pt with no pedal complaints   11/5/2018 plantar foot wound, no new issues  11/12/2018 plantar wound, no new issues  11/19/2018 plantar wound still open    Review of Systems   Constitution: Negative for chills, fever and malaise/fatigue.   HENT: Negative for hearing loss.    Cardiovascular: Positive for leg swelling. Negative for claudication.   Respiratory: Negative for shortness of breath.    Skin: Positive for dry skin. Negative for flushing and rash.   Musculoskeletal: Negative for joint pain and myalgias.   Neurological: Positive for sensory change. Negative for loss of balance, numbness and paresthesias.   Psychiatric/Behavioral: Negative for altered mental status.           Objective:      Physical Exam   Constitutional: He appears well-developed and well-nourished. He is cooperative.   Cardiovascular:   Pulses:       Dorsalis pedis pulses are 0 on the right side, and 0 on the left side.        Posterior tibial pulses are 1+ on the right side, and 1+ on the left side.   Non-pitting LE edema noted b/L   Musculoskeletal:        Right ankle: He exhibits decreased range of motion and abnormal  pulse. Achilles tendon exhibits normal Kimble's test results.        Left ankle: He exhibits decreased range of motion and abnormal pulse. Achilles tendon exhibits normal Kimble's test results.        Right foot: There is decreased range of motion.        Left foot: There is decreased range of motion.   Feet:   Right Foot:   Protective Sensation: 5 sites tested. 2 sites sensed.   Left Foot:   Protective Sensation: 5 sites tested. 2 sites sensed.   Neurological: He is alert.   diminished sensation noted to b/L lower extremities   Skin: Skin is dry. Capillary refill takes more than 3 seconds.   Right dorsal foot, site of previous sx incision healed, hypertrophic.    Ulceration  Location: right plantar foot  Measurements: healed, thin pink skin intact   Psychiatric: His behavior is normal.   Vitals reviewed.                Assessment:       Encounter Diagnosis   Name Primary?    Healed foot ulcer - Right Foot Yes         Plan:       Billy was seen today for diabetes mellitus and diabetic foot exam.    Diagnoses and all orders for this visit:    Healed foot ulcer - Right Foot      I counseled the patient on his conditions, their implications and medical management.      Ulceration on right plantar foot healed.   Protective Football dressing applied to pts right  foot by Dhara Guerra MA under my direct supervision. Pt tolerated dressing well.   RTC in 1 week or sooner if any new pedal problems arise, any signs of infection occur, or if condition worsens.     .

## 2018-12-07 ENCOUNTER — TELEPHONE (OUTPATIENT)
Dept: PODIATRY | Facility: CLINIC | Age: 53
End: 2018-12-07

## 2018-12-07 NOTE — TELEPHONE ENCOUNTER
----- Message from Lesley Kincaid sent at 12/7/2018 10:36 AM CST -----  Contact: [email or web address]  Type: Needs Medical Advice    Who Called:  patient  Symptoms (please be specific):    How long has patient had these symptoms:    Pharmacy name and phone #:    Best Call Back Number:   Additional Information: patient has been cleared for the boot patient wants to know if he can take a normal shower.

## 2018-12-10 NOTE — PROGRESS NOTES
Subjective:      Patient ID: Billy Yatesn is a 53 y.o. male.    Chief Complaint: Foot Problem (PCP Dr. Baker); Foot Ulcer; Wound Care; and Dressing Change    Pt presents to clinic s/p I&D x3 and removal of foreign object (glass) Pt has been d/[facility]a from [facility] and has HH nursing doing dressing changes. Pt is no longer on wound vac. Pt denies any nausea or vomiting    8/21/2018: Pt states that he has also been seeing a wound care doctor at [facility] that advised him to soak the wound. Pt was advised that he cannot have 2 doctors treating the same wound with 2 different treatment plans. Pt states that he would prefer [facility].   8/28/2018: Culture positive for anaerobes, ID present on today's visit, flagyl prescribed.   10/22/2018 Wound appears to be healed. No new issues  10/29/18: Wound with overlying HPK, no SOI, pt with no pedal complaints   11/5/2018 plantar foot wound, no new issues  11/12/2018 plantar wound, no new issues  11/19/2018 plantar wound still open    Review of Systems   Constitution: Negative for chills, fever and malaise/fatigue.   HENT: Negative for hearing loss.    Cardiovascular: Positive for leg swelling. Negative for claudication.   Respiratory: Negative for shortness of breath.    Skin: Positive for dry skin. Negative for flushing and rash.   Musculoskeletal: Negative for joint pain and myalgias.   Neurological: Positive for sensory change. Negative for loss of balance, numbness and paresthesias.   Psychiatric/Behavioral: Negative for altered mental status.           Objective:      Physical Exam   Constitutional: He appears well-developed and well-nourished. He is cooperative.   Cardiovascular:   Pulses:       Dorsalis pedis pulses are 0 on the right side, and 0 on the left side.        Posterior tibial pulses are 1+ on the right side, and 1+ on the left side.   Non-pitting LE edema noted b/L   Musculoskeletal:        Right ankle: He exhibits decreased range of motion and  abnormal pulse. Achilles tendon exhibits normal Kimble's test results.        Left ankle: He exhibits decreased range of motion and abnormal pulse. Achilles tendon exhibits normal Kimble's test results.        Right foot: There is decreased range of motion.        Left foot: There is decreased range of motion.   Feet:   Right Foot:   Protective Sensation: 5 sites tested. 2 sites sensed.   Left Foot:   Protective Sensation: 5 sites tested. 2 sites sensed.   Neurological: He is alert.   diminished sensation noted to b/L lower extremities   Skin: Skin is dry. Capillary refill takes more than 3 seconds.   Right dorsal foot, site of previous sx incision healed, hypertrophic.    Ulceration  Location: right plantar foot  Measurements: healed, thin pink skin intact   Psychiatric: His behavior is normal.   Vitals reviewed.                Assessment:       Encounter Diagnosis   Name Primary?    Healed foot ulcer - Right Foot Yes         Plan:       Billy was seen today for foot problem, foot ulcer, wound care and dressing change.    Diagnoses and all orders for this visit:    Healed foot ulcer - Right Foot      I counseled the patient on his conditions, their implications and medical management.      Ulceration on right plantar foot healed.   Protective Football dressing applied to pts right  foot by Dhara Guerra MA under my direct supervision. Pt tolerated dressing well. Pt advised to removed in 3-5 days and transition back into shoe, looking at his foot every day for signs or re-opening.   RTC in 4 week or sooner if any new pedal problems arise, any signs of infection occur, or if condition worsens.     .

## 2018-12-28 ENCOUNTER — OFFICE VISIT (OUTPATIENT)
Dept: PODIATRY | Facility: CLINIC | Age: 53
End: 2018-12-28

## 2018-12-28 VITALS
HEIGHT: 61 IN | SYSTOLIC BLOOD PRESSURE: 145 MMHG | BODY MASS INDEX: 51.54 KG/M2 | DIASTOLIC BLOOD PRESSURE: 83 MMHG | HEART RATE: 57 BPM | WEIGHT: 273 LBS

## 2018-12-28 DIAGNOSIS — Z79.4 TYPE 2 DIABETES MELLITUS WITH DIABETIC PERIPHERAL ANGIOPATHY WITHOUT GANGRENE, WITH LONG-TERM CURRENT USE OF INSULIN: Primary | ICD-10-CM

## 2018-12-28 DIAGNOSIS — Z87.2 HEALED FOOT ULCER: ICD-10-CM

## 2018-12-28 DIAGNOSIS — E11.51 TYPE 2 DIABETES MELLITUS WITH DIABETIC PERIPHERAL ANGIOPATHY WITHOUT GANGRENE, WITH LONG-TERM CURRENT USE OF INSULIN: Primary | ICD-10-CM

## 2018-12-28 PROCEDURE — 99213 PR OFFICE/OUTPT VISIT, EST, LEVL III, 20-29 MIN: ICD-10-PCS | Mod: S$PBB,,, | Performed by: PODIATRIST

## 2018-12-28 PROCEDURE — 99213 OFFICE O/P EST LOW 20 MIN: CPT | Mod: S$PBB,,, | Performed by: PODIATRIST

## 2018-12-28 PROCEDURE — 99214 OFFICE O/P EST MOD 30 MIN: CPT | Mod: PBBFAC | Performed by: PODIATRIST

## 2018-12-28 PROCEDURE — 99999 PR PBB SHADOW E&M-EST. PATIENT-LVL IV: CPT | Mod: PBBFAC,,, | Performed by: PODIATRIST

## 2018-12-28 PROCEDURE — 99999 PR PBB SHADOW E&M-EST. PATIENT-LVL IV: ICD-10-PCS | Mod: PBBFAC,,, | Performed by: PODIATRIST

## 2019-01-09 NOTE — PROGRESS NOTES
Subjective:      Patient ID: Billy Rivera is a 53 y.o. male.    Chief Complaint: Post-op Evaluation    Pt presents to clinic s/p I&D x3 and removal of foreign object (glass) Pt has been coming weekly and bi-weekly since surgery. Wounds have healed on plantar and dorsal foot. Pt denies any new issues and is now back in his tennis shoes.       Review of Systems   Constitution: Negative for chills, fever and malaise/fatigue.   HENT: Negative for hearing loss.    Cardiovascular: Positive for leg swelling. Negative for claudication.   Respiratory: Negative for shortness of breath.    Skin: Positive for color change, dry skin and nail changes. Negative for flushing and rash.   Musculoskeletal: Negative for joint pain and myalgias.   Neurological: Positive for sensory change. Negative for loss of balance, numbness and paresthesias.   Psychiatric/Behavioral: Negative for altered mental status.           Objective:      Physical Exam   Constitutional: He appears well-developed and well-nourished. He is cooperative.   Cardiovascular:   Pulses:       Dorsalis pedis pulses are 0 on the right side, and 0 on the left side.        Posterior tibial pulses are 1+ on the right side, and 1+ on the left side.   Non-pitting LE edema noted b/L   Musculoskeletal:        Right ankle: He exhibits decreased range of motion and abnormal pulse. Achilles tendon exhibits normal Kimble's test results.        Left ankle: He exhibits decreased range of motion and abnormal pulse. Achilles tendon exhibits normal Kimble's test results.        Right foot: There is decreased range of motion.        Left foot: There is decreased range of motion.   Feet:   Right Foot:   Protective Sensation: 5 sites tested. 2 sites sensed.   Left Foot:   Protective Sensation: 5 sites tested. 2 sites sensed.   Neurological: He is alert.   diminished sensation noted to b/L lower extremities   Skin: Skin is dry. Capillary refill takes more than 3 seconds.   Right  dorsal foot and plantar foot healed. Cicatrix on dorsal foot mildly hypertrophic.    Psychiatric: His behavior is normal.   Vitals reviewed.                Assessment:       Encounter Diagnoses   Name Primary?    Type 2 diabetes mellitus with diabetic peripheral angiopathy without gangrene, with long-term current use of insulin Yes    Healed foot ulcer - Right Foot          Plan:       Billy was seen today for post-op evaluation.    Diagnoses and all orders for this visit:    Type 2 diabetes mellitus with diabetic peripheral angiopathy without gangrene, with long-term current use of insulin    Healed foot ulcer - Right Foot      I counseled the patient on his conditions, their implications and medical management.    Diabetic foot exam performed on pt. Pt advised on daily monitoring and moisturizing of the feet and keeping the webspaces clean and dry Patient advised to contact the clinic if any new pedal problems should arise.     Pt advised the wounds have remained healed on his right foot. . Pt advised to continue with shoes, checking feet daily.   RTC in 9 weeks or sooner if any new pedal problems should arise or if condition worsens.

## 2019-02-19 ENCOUNTER — OFFICE VISIT (OUTPATIENT)
Dept: PODIATRY | Facility: CLINIC | Age: 54
End: 2019-02-19

## 2019-02-19 VITALS
BODY MASS INDEX: 51.54 KG/M2 | WEIGHT: 273 LBS | DIASTOLIC BLOOD PRESSURE: 79 MMHG | HEART RATE: 51 BPM | SYSTOLIC BLOOD PRESSURE: 130 MMHG | HEIGHT: 61 IN

## 2019-02-19 DIAGNOSIS — E11.69 ONYCHOMYCOSIS OF MULTIPLE TOENAILS WITH TYPE 2 DIABETES MELLITUS: Primary | ICD-10-CM

## 2019-02-19 DIAGNOSIS — B35.1 ONYCHOMYCOSIS OF MULTIPLE TOENAILS WITH TYPE 2 DIABETES MELLITUS: Primary | ICD-10-CM

## 2019-02-19 PROCEDURE — 99213 OFFICE O/P EST LOW 20 MIN: CPT | Mod: PBBFAC | Performed by: PODIATRIST

## 2019-02-19 PROCEDURE — 99999 PR PBB SHADOW E&M-EST. PATIENT-LVL III: ICD-10-PCS | Mod: PBBFAC,,, | Performed by: PODIATRIST

## 2019-02-19 PROCEDURE — 99213 OFFICE O/P EST LOW 20 MIN: CPT | Mod: S$PBB,,, | Performed by: PODIATRIST

## 2019-02-19 PROCEDURE — 99213 PR OFFICE/OUTPT VISIT, EST, LEVL III, 20-29 MIN: ICD-10-PCS | Mod: S$PBB,,, | Performed by: PODIATRIST

## 2019-02-19 PROCEDURE — 99999 PR PBB SHADOW E&M-EST. PATIENT-LVL III: CPT | Mod: PBBFAC,,, | Performed by: PODIATRIST

## 2019-02-25 RX ORDER — ATORVASTATIN CALCIUM 80 MG/1
TABLET, FILM COATED ORAL
Qty: 30 TABLET | Refills: 11 | Status: SHIPPED | OUTPATIENT
Start: 2019-02-25 | End: 2020-03-16

## 2019-02-25 NOTE — PROGRESS NOTES
Subjective:      Patient ID: Billy Rivera is a 53 y.o. male.    Chief Complaint: Diabetes Mellitus (06/01/2018 dr henry) and Diabetic Foot Exam    Pt presents to clinic s/p I&D x3 and removal of foreign object (glass). Wounds have healed. Pt is here today for f/u. Pt is high risk due to diabetes with neuropathy.     Review of Systems   Constitution: Negative for chills, fever and malaise/fatigue.   HENT: Negative for hearing loss.    Cardiovascular: Positive for leg swelling. Negative for claudication.   Respiratory: Negative for shortness of breath.    Skin: Positive for color change, dry skin and nail changes. Negative for flushing and rash.   Musculoskeletal: Negative for joint pain and myalgias.   Neurological: Positive for sensory change. Negative for loss of balance, numbness and paresthesias.   Psychiatric/Behavioral: Negative for altered mental status.           Objective:      Physical Exam   Constitutional: He appears well-developed and well-nourished. He is cooperative.   Cardiovascular:   Pulses:       Dorsalis pedis pulses are 0 on the right side, and 0 on the left side.        Posterior tibial pulses are 1+ on the right side, and 1+ on the left side.   Non-pitting LE edema noted b/L   Musculoskeletal:        Right ankle: He exhibits decreased range of motion and abnormal pulse. Achilles tendon exhibits normal Kimble's test results.        Left ankle: He exhibits decreased range of motion and abnormal pulse. Achilles tendon exhibits normal Kimble's test results.        Right foot: There is decreased range of motion.        Left foot: There is decreased range of motion.   Feet:   Right Foot:   Protective Sensation: 5 sites tested. 2 sites sensed.   Left Foot:   Protective Sensation: 5 sites tested. 2 sites sensed.   Neurological: He is alert.   diminished sensation noted to b/L lower extremities   Skin: Skin is dry. Capillary refill takes more than 3 seconds.   Right dorsal foot and  plantar foot healed. Cicatrix on dorsal foot mildly hypertrophic.   Nails x10 elongated and discolored   Psychiatric: His behavior is normal.   Vitals reviewed.                Assessment:       Encounter Diagnosis   Name Primary?    Onychomycosis of multiple toenails with type 2 diabetes mellitus Yes         Plan:       Billy was seen today for diabetes mellitus and diabetic foot exam.    Diagnoses and all orders for this visit:    Onychomycosis of multiple toenails with type 2 diabetes mellitus      I counseled the patient on his conditions, their implications and medical management.    Diabetic foot exam performed on pt. Pt advised on daily monitoring and moisturizing of the feet and keeping the webspaces clean and dry Patient advised to contact the clinic if any new pedal problems should arise.     Pt advised the wounds have remained healed on his right foot. . Pt advised to continue with shoes, checking feet daily.   Nail x10 debrided as a courtesy  RTC in 9 weeks or sooner if any new pedal problems should arise or if condition worsens.

## 2019-03-21 DIAGNOSIS — Z79.4 TYPE 2 DIABETES MELLITUS WITH HYPERGLYCEMIA, WITH LONG-TERM CURRENT USE OF INSULIN: ICD-10-CM

## 2019-03-21 DIAGNOSIS — E11.65 TYPE 2 DIABETES MELLITUS WITH HYPERGLYCEMIA, WITH LONG-TERM CURRENT USE OF INSULIN: ICD-10-CM

## 2019-03-21 RX ORDER — LISINOPRIL 40 MG/1
TABLET ORAL
Qty: 30 TABLET | Refills: 11 | Status: SHIPPED | OUTPATIENT
Start: 2019-03-21 | End: 2020-03-31 | Stop reason: SDUPTHER

## 2019-03-21 RX ORDER — METFORMIN HYDROCHLORIDE 1000 MG/1
TABLET ORAL
Qty: 60 TABLET | Refills: 6 | Status: SHIPPED | OUTPATIENT
Start: 2019-03-21 | End: 2020-03-25

## 2019-04-24 DIAGNOSIS — Z79.4 TYPE 2 DIABETES MELLITUS WITH HYPERGLYCEMIA, WITH LONG-TERM CURRENT USE OF INSULIN: ICD-10-CM

## 2019-04-24 DIAGNOSIS — E11.65 TYPE 2 DIABETES MELLITUS WITH HYPERGLYCEMIA, WITH LONG-TERM CURRENT USE OF INSULIN: ICD-10-CM

## 2019-04-24 RX ORDER — INSULIN GLARGINE 100 [IU]/ML
INJECTION, SOLUTION SUBCUTANEOUS
Qty: 4 BOX | Refills: 3 | Status: SHIPPED | OUTPATIENT
Start: 2019-04-24 | End: 2020-03-31 | Stop reason: SDUPTHER

## 2019-05-13 ENCOUNTER — TELEPHONE (OUTPATIENT)
Dept: INTERNAL MEDICINE | Facility: CLINIC | Age: 54
End: 2019-05-13

## 2019-05-13 NOTE — TELEPHONE ENCOUNTER
----- Message from Rishabh Delgado sent at 5/13/2019  8:51 AM CDT -----  Contact: Spouse [phone]  Sooner appointment than the  can schedule.  Did you offer to schedule the next available appointment and put the patient on the wait list?:    When is the first available appointment: No slots available  What is the nature of the appointment: EPP  What visit type: epp  Patient preference of timeframe to be scheduled:    Comments:     Please call an advise  Thank you

## 2019-05-22 ENCOUNTER — OFFICE VISIT (OUTPATIENT)
Dept: PODIATRY | Facility: CLINIC | Age: 54
End: 2019-05-22
Payer: OTHER MISCELLANEOUS

## 2019-05-22 VITALS
SYSTOLIC BLOOD PRESSURE: 122 MMHG | HEART RATE: 57 BPM | HEIGHT: 61 IN | DIASTOLIC BLOOD PRESSURE: 76 MMHG | WEIGHT: 273 LBS | BODY MASS INDEX: 51.54 KG/M2

## 2019-05-22 DIAGNOSIS — B35.1 ONYCHOMYCOSIS OF MULTIPLE TOENAILS WITH TYPE 2 DIABETES MELLITUS: Primary | ICD-10-CM

## 2019-05-22 DIAGNOSIS — E11.69 ONYCHOMYCOSIS OF MULTIPLE TOENAILS WITH TYPE 2 DIABETES MELLITUS: Primary | ICD-10-CM

## 2019-05-22 PROCEDURE — 11721 DEBRIDE NAIL 6 OR MORE: CPT | Mod: Q9,S$GLB,, | Performed by: PODIATRIST

## 2019-05-22 PROCEDURE — 99999 PR PBB SHADOW E&M-EST. PATIENT-LVL III: CPT | Mod: PBBFAC,,, | Performed by: PODIATRIST

## 2019-05-22 PROCEDURE — 99499 UNLISTED E&M SERVICE: CPT | Mod: S$GLB,,, | Performed by: PODIATRIST

## 2019-05-22 PROCEDURE — 99499 NO LOS: ICD-10-PCS | Mod: S$GLB,,, | Performed by: PODIATRIST

## 2019-05-22 PROCEDURE — 99999 PR PBB SHADOW E&M-EST. PATIENT-LVL III: ICD-10-PCS | Mod: PBBFAC,,, | Performed by: PODIATRIST

## 2019-05-22 PROCEDURE — 11721 PR DEBRIDEMENT OF NAILS, 6 OR MORE: ICD-10-PCS | Mod: Q9,S$GLB,, | Performed by: PODIATRIST

## 2019-05-22 NOTE — PROGRESS NOTES
Subjective:      Patient ID: Billy Rivera is a 53 y.o. male.    Chief Complaint: Post-op Evaluation    Pt presents to clinic for diabetic foot care and elongated toenails. Pt is high risk due to diabetic neuropathy.     Review of Systems   Constitution: Negative for chills, fever and malaise/fatigue.   HENT: Negative for hearing loss.    Cardiovascular: Positive for leg swelling. Negative for claudication.   Respiratory: Negative for shortness of breath.    Skin: Positive for color change, dry skin and nail changes. Negative for flushing and rash.   Musculoskeletal: Negative for joint pain and myalgias.   Neurological: Positive for sensory change. Negative for loss of balance, numbness and paresthesias.   Psychiatric/Behavioral: Negative for altered mental status.           Objective:      Physical Exam   Constitutional: He appears well-developed and well-nourished. He is cooperative.   Cardiovascular:   Pulses:       Dorsalis pedis pulses are 0 on the right side, and 0 on the left side.        Posterior tibial pulses are 1+ on the right side, and 1+ on the left side.   Non-pitting LE edema noted b/L   Musculoskeletal:        Right ankle: He exhibits decreased range of motion and abnormal pulse. Achilles tendon exhibits normal Kimble's test results.        Left ankle: He exhibits decreased range of motion and abnormal pulse. Achilles tendon exhibits normal Kimble's test results.        Right foot: There is decreased range of motion.        Left foot: There is decreased range of motion.   Feet:   Right Foot:   Protective Sensation: 5 sites tested. 2 sites sensed.   Left Foot:   Protective Sensation: 5 sites tested. 2 sites sensed.   Neurological: He is alert.   diminished sensation noted to b/L lower extremities   Skin: Skin is dry. Capillary refill takes more than 3 seconds.   Right dorsal foot and plantar foot healed. Cicatrix on dorsal foot mildly hypertrophic.     Nails x10 are elongated by  3-4mm's,  thickened by 2-3 mm's, dystrophic, and are yellowish in  coloration . Xerosis Bilaterally. No open lesions noted.      Psychiatric: His behavior is normal.   Vitals reviewed.                Assessment:       Encounter Diagnosis   Name Primary?    Onychomycosis of multiple toenails with type 2 diabetes mellitus Yes         Plan:       Billy was seen today for post-op evaluation.    Diagnoses and all orders for this visit:    Onychomycosis of multiple toenails with type 2 diabetes mellitus      I counseled the patient on his conditions, their implications and medical management.    Diabetic foot exam performed on pt. Pt advised on daily monitoring and moisturizing of the feet and keeping the webspaces clean and dry Patient advised to contact the clinic if any new pedal problems should arise.     With the patient's permission, nails were aggressively reduced and debrided X 10 to their soft tissue attachment mechanically and with an electric  removing all offending nail and debris. Pt relates relief following the procedure. he will continue to monitor the areas daily, inspect his feet, wear protective shoe gear when ambulating, moisturized daily to maintain skin integrity and follow up in the office in 3 months.

## 2019-05-28 ENCOUNTER — TELEPHONE (OUTPATIENT)
Dept: INTERNAL MEDICINE | Facility: CLINIC | Age: 54
End: 2019-05-28

## 2019-05-28 NOTE — TELEPHONE ENCOUNTER
----- Message from Zonia Canales sent at 5/28/2019 11:09 AM CDT -----  Contact: Self [phone]  Pt states he need to speak to the nurse ASAP this is emergency insulin glargine (LANTUS SOLOSTAR U-100 INSULIN) 100 unit/mL (3 mL) InPn pen

## 2019-05-28 NOTE — TELEPHONE ENCOUNTER
I called the endo department and was disconnected.  Up on checking patient chart it appear that he got a 90day supply in April so I call [facility] back and spoke to pharmacists and she did some checking .  She said that they it appears that his med is being process a no cost to him.  I called and left a message for patient med was being filled

## 2019-05-28 NOTE — TELEPHONE ENCOUNTER
Spoke to pt and he said insurance informed him that he max out his insurance benefits.  I asked patient to give me some time I would call to see  If we could get him some help

## 2019-05-30 ENCOUNTER — TELEPHONE (OUTPATIENT)
Dept: PODIATRY | Facility: CLINIC | Age: 54
End: 2019-05-30

## 2019-06-07 ENCOUNTER — TELEPHONE (OUTPATIENT)
Dept: INTERNAL MEDICINE | Facility: CLINIC | Age: 54
End: 2019-06-07

## 2019-06-07 NOTE — TELEPHONE ENCOUNTER
----- Message from Debbi Valero sent at 6/7/2019  1:16 PM CDT -----  Contact: Pt self Mobile [phone] or Home [phone]  Patient would like a call back in regards to him wanting to know if he could take his Novolin medication with his diabetes medication or if he should just lower the dosage?

## 2019-06-07 NOTE — TELEPHONE ENCOUNTER
Need clarification regarding this question and his diabetic meds.  Our records he is on metformin , lantus and novolog insulin.  ??    Why is he asking about Novolin insulin?

## 2019-06-07 NOTE — TELEPHONE ENCOUNTER
Spoke with pt who advises that his insurance company refuses to pay for any short acting insulins.    He was told that Humalog is comparable to the Novolin R.    Pt is currently taking humalog 20 units per meal (TID). If you are agreeable with changing to Novolin R, please advise if he is to taking the same units (20) TID or if there would be a new sig.    Pt has a vial of Novolin R on hand and needs to know what units he is to take TID.    Please advise.    Thanks.

## 2019-06-19 DIAGNOSIS — Z01.00 DIABETIC EYE EXAM: Primary | ICD-10-CM

## 2019-06-19 DIAGNOSIS — E11.9 DIABETIC EYE EXAM: Primary | ICD-10-CM

## 2019-06-27 ENCOUNTER — PATIENT OUTREACH (OUTPATIENT)
Dept: ADMINISTRATIVE | Facility: HOSPITAL | Age: 54
End: 2019-06-27

## 2019-07-20 RX ORDER — CARVEDILOL 12.5 MG/1
TABLET ORAL
Qty: 60 TABLET | Refills: 11 | OUTPATIENT
Start: 2019-07-20

## 2019-07-23 RX ORDER — CARVEDILOL 6.25 MG/1
6.25 TABLET ORAL 2 TIMES DAILY
Qty: 60 TABLET | Refills: 1 | Status: SHIPPED | OUTPATIENT
Start: 2019-07-23 | End: 2019-08-06

## 2019-07-23 NOTE — TELEPHONE ENCOUNTER
Advise the patient that the recent prescription request was filled though he needs to schedule an office visit.

## 2019-07-23 NOTE — TELEPHONE ENCOUNTER
----- Message from Norma Naranjo sent at 2019 10:33 AM CDT -----  Contact: patient [phone]  Joana Pt needs a refill of   carvedilol (COREG) 6.25 MG tablet ()                        and pharmacy needs authorization. Pharmacy told pt that they have sent the request.    [facility] [address] [place] [address] [phone]

## 2019-08-06 ENCOUNTER — OFFICE VISIT (OUTPATIENT)
Dept: INTERNAL MEDICINE | Facility: CLINIC | Age: 54
End: 2019-08-06
Payer: COMMERCIAL

## 2019-08-06 VITALS
HEART RATE: 76 BPM | TEMPERATURE: 98 F | WEIGHT: 283.31 LBS | BODY MASS INDEX: 36.36 KG/M2 | SYSTOLIC BLOOD PRESSURE: 132 MMHG | RESPIRATION RATE: 20 BRPM | DIASTOLIC BLOOD PRESSURE: 84 MMHG | HEIGHT: 74 IN

## 2019-08-06 DIAGNOSIS — E66.01 CLASS 2 SEVERE OBESITY WITH BODY MASS INDEX (BMI) OF 35 TO 39.9 WITH SERIOUS COMORBIDITY: ICD-10-CM

## 2019-08-06 DIAGNOSIS — I25.10 CORONARY ARTERY DISEASE INVOLVING NATIVE CORONARY ARTERY OF NATIVE HEART WITHOUT ANGINA PECTORIS: ICD-10-CM

## 2019-08-06 DIAGNOSIS — Z79.4 DIABETES MELLITUS WITH INSULIN THERAPY: ICD-10-CM

## 2019-08-06 DIAGNOSIS — Z85.038 HX OF COLON CANCER, STAGE I: ICD-10-CM

## 2019-08-06 DIAGNOSIS — G47.30 SLEEP APNEA, UNSPECIFIED TYPE: ICD-10-CM

## 2019-08-06 DIAGNOSIS — I15.2 HYPERTENSION ASSOCIATED WITH DIABETES: ICD-10-CM

## 2019-08-06 DIAGNOSIS — I15.2 OBESITY, DIABETES, AND HYPERTENSION SYNDROME: ICD-10-CM

## 2019-08-06 DIAGNOSIS — E66.9 OBESITY, DIABETES, AND HYPERTENSION SYNDROME: ICD-10-CM

## 2019-08-06 DIAGNOSIS — Z11.59 NEED FOR HEPATITIS C SCREENING TEST: ICD-10-CM

## 2019-08-06 DIAGNOSIS — E11.9 DIABETES MELLITUS WITH INSULIN THERAPY: ICD-10-CM

## 2019-08-06 DIAGNOSIS — Z00.00 ENCOUNTER FOR PREVENTIVE HEALTH EXAMINATION: Primary | ICD-10-CM

## 2019-08-06 DIAGNOSIS — E11.69 DYSLIPIDEMIA ASSOCIATED WITH TYPE 2 DIABETES MELLITUS: ICD-10-CM

## 2019-08-06 DIAGNOSIS — E11.69 OBESITY, DIABETES, AND HYPERTENSION SYNDROME: ICD-10-CM

## 2019-08-06 DIAGNOSIS — Z79.4 DIABETES MELLITUS DUE TO UNDERLYING CONDITION WITH HYPEROSMOLARITY WITHOUT COMA, WITH LONG-TERM CURRENT USE OF INSULIN: ICD-10-CM

## 2019-08-06 DIAGNOSIS — E11.59 HYPERTENSION ASSOCIATED WITH DIABETES: ICD-10-CM

## 2019-08-06 DIAGNOSIS — E11.59 OBESITY, DIABETES, AND HYPERTENSION SYNDROME: ICD-10-CM

## 2019-08-06 DIAGNOSIS — E78.5 DYSLIPIDEMIA ASSOCIATED WITH TYPE 2 DIABETES MELLITUS: ICD-10-CM

## 2019-08-06 DIAGNOSIS — E08.00 DIABETES MELLITUS DUE TO UNDERLYING CONDITION WITH HYPEROSMOLARITY WITHOUT COMA, WITH LONG-TERM CURRENT USE OF INSULIN: ICD-10-CM

## 2019-08-06 PROBLEM — E66.812 CLASS 2 SEVERE OBESITY WITH BODY MASS INDEX (BMI) OF 35 TO 39.9 WITH SERIOUS COMORBIDITY: Status: ACTIVE | Noted: 2019-08-06

## 2019-08-06 PROCEDURE — 99396 PREV VISIT EST AGE 40-64: CPT | Mod: S$GLB,,, | Performed by: INTERNAL MEDICINE

## 2019-08-06 PROCEDURE — 99999 PR PBB SHADOW E&M-EST. PATIENT-LVL III: CPT | Mod: PBBFAC,,, | Performed by: INTERNAL MEDICINE

## 2019-08-06 PROCEDURE — 99999 PR PBB SHADOW E&M-EST. PATIENT-LVL III: ICD-10-PCS | Mod: PBBFAC,,, | Performed by: INTERNAL MEDICINE

## 2019-08-06 PROCEDURE — 99396 PR PREVENTIVE VISIT,EST,40-64: ICD-10-PCS | Mod: S$GLB,,, | Performed by: INTERNAL MEDICINE

## 2019-08-06 RX ORDER — CARVEDILOL 12.5 MG/1
TABLET ORAL 2 TIMES DAILY
Refills: 11 | COMMUNITY
Start: 2019-05-13 | End: 2019-10-23 | Stop reason: SDUPTHER

## 2019-08-06 NOTE — PROGRESS NOTES
History of present illness:  [age] year male in today for general health assessment.    Current medications:  All medications noted and reviewed in the electronic medical record medication list.    Review of systems:  General: no fever, chills, generalized body aches. No unexpected weight loss.  Eyes:  No visual disturbances.  HEENT:  No hoarseness, dysphagia, ear pain.  Respiratory:  No cough, no shortness of breath.  Cardiovascular: no chest pain, palpitations, cough, exertional limb pain. No edema.  GI: no nausea, vomiting.  No abdominal pain. No change in bowel habits.  No melena, no hematochezia.  : no dysuria. No change in the color or character of the urine. No urinary frequency.  Musculoskeletal: no joint pain or swelling.  Neurologic:  No focal neurological complaints.  No headaches.  Skin:  No rashes or other concerns.  Psych:  No emotional issues    Past medical history, past surgical history, family medical history social history is are all noted and reviewed in the electronic medical record history sections.    Health screenings:  Colonoscopy February 2017.  Due for eye exam.  Tetanus immunization up to date  He has had pneumococcal vaccines.    Physical examination:  GENERAL:  Alert, appropriately groomed, no acute distress.  VS:  Blood pressure taken manually is 132/84.  EYES: sclerae white ,nonicteric. PERRL.  HEENT:  Normocephalic. Ear canals and tympanic membranes normal. Mouth and pharynx normal. No thyromegaly. Trachea midline and freely mobile.  LUNGS:  Clear to ascultation and normal to percussion.  CARDIOVASCULAR:  Normal heart sounds.  No significant murmur. Carotids full bilaterally without bruit.  Pedal pulses intact .  No abdominal bruit.  No peripheral extremity edema.  GI: the abdomen is obese, soft, no distension. No masses , tenderness, organomegaly.  Rectal examination normal.  : scrotum, testicles and penis normal. Prostate without nodules or asymmetry.  LYMPHATIC:  No  axillary, inguinal , cervical adenopathy.  MUSCULOSKELETAL:  Range of motion, stability and strength of the right and left upper and lower extremities normal. No swollen or tender joints  NEUROLOGIC:  DTR's normal. No gross motor or sensory deficits apparent, gait normal.  SKIN:  No rashes.   MS:  Alert, oriented , affect and mood all appropriate  Diabetic foot exam:    Visual Inspection:  Normal -  Bilateral    Pedal Pulses:   Right: Present  Left: Present    Posterior tibialis:   Right:Present  Left: Present      Impression:  54-year-old male several chronic medical issues.  Hypertension controlled.  Dyslipidemia on high-dose statin therapy.  Type 2 diabetes mellitus on insulin therapy.  Severe obesity BMI 36.37.  Obstructive sleep apnea.  History CAD clinically stable.  History of colon cancer status post resection 2017.      Plan:  Update health maintenance and other lab data to include CBC, chemistry profile, lipid profile, TSH, urinalysis, urine for microalbumin/creatinine ratio, PSA, hepatitis-C screen.  He is reminded to make an appointment with Ophthalmology.  Encouraged lifestyle changes.  Return clinic 6 months follow-up.

## 2019-08-07 ENCOUNTER — LAB VISIT (OUTPATIENT)
Dept: LAB | Facility: HOSPITAL | Age: 54
End: 2019-08-07
Attending: INTERNAL MEDICINE
Payer: COMMERCIAL

## 2019-08-07 DIAGNOSIS — E08.00 DIABETES MELLITUS DUE TO UNDERLYING CONDITION WITH HYPEROSMOLARITY WITHOUT COMA, WITH LONG-TERM CURRENT USE OF INSULIN: ICD-10-CM

## 2019-08-07 DIAGNOSIS — Z00.00 ENCOUNTER FOR PREVENTIVE HEALTH EXAMINATION: ICD-10-CM

## 2019-08-07 DIAGNOSIS — Z11.59 NEED FOR HEPATITIS C SCREENING TEST: ICD-10-CM

## 2019-08-07 DIAGNOSIS — Z79.4 DIABETES MELLITUS DUE TO UNDERLYING CONDITION WITH HYPEROSMOLARITY WITHOUT COMA, WITH LONG-TERM CURRENT USE OF INSULIN: ICD-10-CM

## 2019-08-07 LAB
ALBUMIN SERPL BCP-MCNC: 3.6 G/DL (ref 3.5–5.2)
ALP SERPL-CCNC: 104 U/L (ref 55–135)
ALT SERPL W/O P-5'-P-CCNC: 36 U/L (ref 10–44)
ANION GAP SERPL CALC-SCNC: 10 MMOL/L (ref 8–16)
AST SERPL-CCNC: 22 U/L (ref 10–40)
BASOPHILS # BLD AUTO: 0.03 K/UL (ref 0–0.2)
BASOPHILS NFR BLD: 0.4 % (ref 0–1.9)
BILIRUB SERPL-MCNC: 0.6 MG/DL (ref 0.1–1)
BUN SERPL-MCNC: 18 MG/DL (ref 6–20)
CALCIUM SERPL-MCNC: 9.1 MG/DL (ref 8.7–10.5)
CHLORIDE SERPL-SCNC: 100 MMOL/L (ref 95–110)
CHOLEST SERPL-MCNC: 172 MG/DL (ref 120–199)
CHOLEST/HDLC SERPL: 4.3 {RATIO} (ref 2–5)
CO2 SERPL-SCNC: 26 MMOL/L (ref 23–29)
COMPLEXED PSA SERPL-MCNC: 0.32 NG/ML (ref 0–4)
CREAT SERPL-MCNC: 1.2 MG/DL (ref 0.5–1.4)
DIFFERENTIAL METHOD: ABNORMAL
EOSINOPHIL # BLD AUTO: 0.2 K/UL (ref 0–0.5)
EOSINOPHIL NFR BLD: 3.1 % (ref 0–8)
ERYTHROCYTE [DISTWIDTH] IN BLOOD BY AUTOMATED COUNT: 12.5 % (ref 11.5–14.5)
EST. GFR  (AFRICAN AMERICAN): >60 ML/MIN/1.73 M^2
EST. GFR  (NON AFRICAN AMERICAN): >60 ML/MIN/1.73 M^2
ESTIMATED AVG GLUCOSE: 312 MG/DL (ref 68–131)
GLUCOSE SERPL-MCNC: 315 MG/DL (ref 70–110)
HBA1C MFR BLD HPLC: 12.5 % (ref 4–5.6)
HCT VFR BLD AUTO: 47.2 % (ref 40–54)
HCV AB SERPL QL IA: NEGATIVE
HDLC SERPL-MCNC: 40 MG/DL (ref 40–75)
HDLC SERPL: 23.3 % (ref 20–50)
HGB BLD-MCNC: 15.9 G/DL (ref 14–18)
IMM GRANULOCYTES # BLD AUTO: 0.05 K/UL (ref 0–0.04)
IMM GRANULOCYTES NFR BLD AUTO: 0.7 % (ref 0–0.5)
LDLC SERPL CALC-MCNC: 100.8 MG/DL (ref 63–159)
LYMPHOCYTES # BLD AUTO: 1.8 K/UL (ref 1–4.8)
LYMPHOCYTES NFR BLD: 25.5 % (ref 18–48)
MCH RBC QN AUTO: 29.9 PG (ref 27–31)
MCHC RBC AUTO-ENTMCNC: 33.7 G/DL (ref 32–36)
MCV RBC AUTO: 89 FL (ref 82–98)
MONOCYTES # BLD AUTO: 0.7 K/UL (ref 0.3–1)
MONOCYTES NFR BLD: 9.4 % (ref 4–15)
NEUTROPHILS # BLD AUTO: 4.3 K/UL (ref 1.8–7.7)
NEUTROPHILS NFR BLD: 60.9 % (ref 38–73)
NONHDLC SERPL-MCNC: 132 MG/DL
NRBC BLD-RTO: 0 /100 WBC
PLATELET # BLD AUTO: 230 K/UL (ref 150–350)
PMV BLD AUTO: 10.8 FL (ref 9.2–12.9)
POTASSIUM SERPL-SCNC: 4.2 MMOL/L (ref 3.5–5.1)
PROT SERPL-MCNC: 7.1 G/DL (ref 6–8.4)
RBC # BLD AUTO: 5.31 M/UL (ref 4.6–6.2)
SODIUM SERPL-SCNC: 136 MMOL/L (ref 136–145)
TRIGL SERPL-MCNC: 156 MG/DL (ref 30–150)
TSH SERPL DL<=0.005 MIU/L-ACNC: 1.91 UIU/ML (ref 0.4–4)
WBC # BLD AUTO: 6.99 K/UL (ref 3.9–12.7)

## 2019-08-07 PROCEDURE — 84153 ASSAY OF PSA TOTAL: CPT

## 2019-08-07 PROCEDURE — 36415 COLL VENOUS BLD VENIPUNCTURE: CPT | Mod: PO

## 2019-08-07 PROCEDURE — 80053 COMPREHEN METABOLIC PANEL: CPT

## 2019-08-07 PROCEDURE — 86803 HEPATITIS C AB TEST: CPT

## 2019-08-07 PROCEDURE — 85025 COMPLETE CBC W/AUTO DIFF WBC: CPT

## 2019-08-07 PROCEDURE — 84443 ASSAY THYROID STIM HORMONE: CPT

## 2019-08-07 PROCEDURE — 83036 HEMOGLOBIN GLYCOSYLATED A1C: CPT

## 2019-08-07 PROCEDURE — 80061 LIPID PANEL: CPT

## 2019-08-09 ENCOUNTER — PATIENT MESSAGE (OUTPATIENT)
Dept: INTERNAL MEDICINE | Facility: CLINIC | Age: 54
End: 2019-08-09

## 2019-08-15 ENCOUNTER — TELEPHONE (OUTPATIENT)
Dept: INTERNAL MEDICINE | Facility: CLINIC | Age: 54
End: 2019-08-15

## 2019-08-15 ENCOUNTER — OFFICE VISIT (OUTPATIENT)
Dept: OPTOMETRY | Facility: CLINIC | Age: 54
End: 2019-08-15
Payer: COMMERCIAL

## 2019-08-15 DIAGNOSIS — H52.13 MYOPIA WITH ASTIGMATISM AND PRESBYOPIA, BILATERAL: ICD-10-CM

## 2019-08-15 DIAGNOSIS — H52.4 MYOPIA WITH ASTIGMATISM AND PRESBYOPIA, BILATERAL: ICD-10-CM

## 2019-08-15 DIAGNOSIS — H25.13 NUCLEAR SCLEROSIS OF BOTH EYES: ICD-10-CM

## 2019-08-15 DIAGNOSIS — E11.65 UNCONTROLLED TYPE 2 DIABETES MELLITUS WITH HYPERGLYCEMIA: Primary | ICD-10-CM

## 2019-08-15 DIAGNOSIS — E11.3293 MILD NONPROLIFERATIVE DIABETIC RETINOPATHY OF BOTH EYES WITHOUT MACULAR EDEMA ASSOCIATED WITH TYPE 2 DIABETES MELLITUS: Primary | ICD-10-CM

## 2019-08-15 DIAGNOSIS — H52.203 MYOPIA WITH ASTIGMATISM AND PRESBYOPIA, BILATERAL: ICD-10-CM

## 2019-08-15 PROCEDURE — 92014 COMPRE OPH EXAM EST PT 1/>: CPT | Mod: S$GLB,,, | Performed by: OPTOMETRIST

## 2019-08-15 PROCEDURE — 92015 DETERMINE REFRACTIVE STATE: CPT | Mod: S$GLB,,, | Performed by: OPTOMETRIST

## 2019-08-15 PROCEDURE — 99999 PR PBB SHADOW E&M-EST. PATIENT-LVL III: CPT | Mod: PBBFAC,,, | Performed by: OPTOMETRIST

## 2019-08-15 PROCEDURE — 92014 PR EYE EXAM, EST PATIENT,COMPREHESV: ICD-10-PCS | Mod: S$GLB,,, | Performed by: OPTOMETRIST

## 2019-08-15 PROCEDURE — 92015 PR REFRACTION: ICD-10-PCS | Mod: S$GLB,,, | Performed by: OPTOMETRIST

## 2019-08-15 PROCEDURE — 99999 PR PBB SHADOW E&M-EST. PATIENT-LVL III: ICD-10-PCS | Mod: PBBFAC,,, | Performed by: OPTOMETRIST

## 2019-08-15 NOTE — PROGRESS NOTES
HPI     JUANJO:2017  Glasses? Yes   Contacts? no  H/o eye surgery, injections or laser: no  H/o eye injury: no  Known eye conditions? Diabetic retinopathy OU   Family h/o eye conditions? no  Eye gtts?no    (-) Flashes (-) Floaters (-) Mucous   (-) Tearing (-) Itching (-) Burning   (-) Headaches (-) Eye Pain/discomfort (-) Irritation   (-) Redness (-) Double vision (-) Blurry vision    Diabetic? (+)  A1c?  (Hemoglobin A1C       Date                     Value               Ref Range             Status                08/07/2019               12.5 (H)            4.0 - 5.6 %           Final              Comment:    ADA Screening Guidelines:  5.7-6.4%  Consistent with   prediabetes  >or=6.5%  Consistent with diabetes  High levels of fetal   hemoglobin interfere with the HbA1C  assay. Heterozygous hemoglobin   variants (HbS, HgC, etc)do  not significantly interfere with this assay.     However, presence of multiple variants may affect accuracy.         06/02/2018               12.0 (H)            4.0 - 5.6 %           Final              Comment:    ADA Screening Guidelines:  5.7-6.4%  Consistent with   prediabetes  >or=6.5%  Consistent with diabetes  High levels of fetal   hemoglobin interfere with the HbA1C  assay. Heterozygous hemoglobin   variants (HbS, HgC, etc)do  not significantly interfere with this assay.     However, presence of multiple variants may affect accuracy.         02/22/2018               9.7 (H)             4.0 - 5.6 %           Final              Comment:    According to ADA guidelines, hemoglobin A1c <7.0% represents  optimal   control in non-pregnant diabetic patients. Different  metrics may apply to   specific patient populations.   Standards of Medical Care in   Diabetes-2016.  For the purpose of screening for the presence of   diabetes:  <5.7%     Consistent with the absence of diabetes  5.7-6.4%    Consistent with increasing risk for diabetes   (prediabetes)  >or=6.5%    Consistent with  diabetes  Currently, no consensus exists for use of   hemoglobin A1c  for diagnosis of diabetes for children.  This Hemoglobin   A1c assay has significant interference with fetal   hemoglobin   (HbF).   The results are invalid for patients with abnormal amounts of   HbF,     including those with known Hereditary Persistence   of Fetal Hemoglobin.   Heterozygous hemoglobin variants (HbAS, HbAC,   HbAD, HbAE, HbA2) do not   significantly interfere with this assay;   however, presence of multiple   variants in a sample may impact the %   interference.    ----------)        Last edited by Shannan Jo on 8/15/2019  2:01 PM. (History)        ROS     Negative for: Constitutional, Gastrointestinal, Neurological, Skin,   Genitourinary, Musculoskeletal, HENT, Endocrine, Cardiovascular, Eyes,   Respiratory, Psychiatric, Allergic/Imm, Heme/Lymph    Last edited by Anna Marie Skaggs, OD on 8/15/2019  2:15 PM. (History)        Assessment /Plan     For exam results, see Encounter Report.    Mild nonproliferative diabetic retinopathy of both eyes without macular edema associated with type 2 diabetes mellitus    Nuclear sclerosis of both eyes    Myopia with astigmatism and presbyopia, bilateral      1. A1c 12.5%. BS control strongly advised. Monitor with annual exam.  2. Nuclear sclerotic cataract - not visually significant. Observe.  3. SRx released to patient. Patient educated on lens options. Normal ocular health. RTC 1 year for routine exam.

## 2019-08-15 NOTE — TELEPHONE ENCOUNTER
Informed the patient that recent lab data generally look good other than very poor diabetic control.  He was seen by Endocrinology previously and needs to follow up with Endocrinology for management of his diabetes.  We may need to enter new referral since it was 2017 he last visited with them.  Referral entered

## 2019-08-15 NOTE — TELEPHONE ENCOUNTER
----- Message from Madiha Ahn sent at 8/15/2019 12:26 PM CDT -----  Tried calling pt to schedule an appointment with Endocrinology (I was aware there were a few appointments next week)  Pt did not want to schedule at this time, but would call back in the future.  SHANNEN

## 2019-10-04 NOTE — PLAN OF CARE
1. Caller Name: pt                                         Call Back Number: [phone] (home)       Patient approves a detailed voicemail message: N\A    Patient is on the MA Schedule tomorrow for Flu  vaccine/injection.    SPECIFIC Action To Be Taken: Orders pending, please sign.   Pre op care complete. Orders reviewed and consents verified. Type and screen sent to blood bank. Wife at bedside. Anesthesia consent pending.

## 2019-10-23 RX ORDER — CARVEDILOL 12.5 MG/1
TABLET ORAL
Qty: 60 TABLET | Refills: 11 | Status: SHIPPED | OUTPATIENT
Start: 2019-10-23 | End: 2020-03-31 | Stop reason: SDUPTHER

## 2019-10-23 NOTE — TELEPHONE ENCOUNTER
----- Message from Irina Ko sent at 10/23/2019  3:32 PM CDT -----  Contact: [phone]  Type: Rx    Name of medication(s): carvedilol (COREG) 12.5 MG tablet    Is this a refill? New rx? refill    Who prescribed medication? Brown    Pharmacy Name, Phone, & Location:[facility] [address] [place] [address]     Comments:  Please advise, thank you.

## 2019-12-06 DIAGNOSIS — E11.9 TYPE 2 DIABETES MELLITUS WITHOUT COMPLICATION: ICD-10-CM

## 2020-01-06 PROBLEM — Z13.9 ENCOUNTER FOR HEALTH-RELATED SCREENING: Status: RESOLVED | Noted: 2017-02-17 | Resolved: 2020-01-06

## 2020-01-22 NOTE — TELEPHONE ENCOUNTER
----- Message from Katie Foreman sent at 8/23/2018  8:49 AM CDT -----  Contact: [place] [phone] office   RN Case Manager-Zainab ramsey clinical notes from 8/07 fax to  office  ext [phone]   Refill done according to [facility] policy  Patient last seen 12/3/19

## 2020-03-16 RX ORDER — ATORVASTATIN CALCIUM 80 MG/1
TABLET, FILM COATED ORAL
Qty: 30 TABLET | Refills: 3 | Status: SHIPPED | OUTPATIENT
Start: 2020-03-16 | End: 2020-03-31 | Stop reason: SDUPTHER

## 2020-03-25 DIAGNOSIS — Z79.4 TYPE 2 DIABETES MELLITUS WITH HYPERGLYCEMIA, WITH LONG-TERM CURRENT USE OF INSULIN: ICD-10-CM

## 2020-03-25 DIAGNOSIS — E11.65 TYPE 2 DIABETES MELLITUS WITH HYPERGLYCEMIA, WITH LONG-TERM CURRENT USE OF INSULIN: ICD-10-CM

## 2020-03-25 RX ORDER — METFORMIN HYDROCHLORIDE 1000 MG/1
TABLET ORAL
Qty: 60 TABLET | Refills: 2 | Status: SHIPPED | OUTPATIENT
Start: 2020-03-25 | End: 2020-03-31 | Stop reason: SDUPTHER

## 2020-03-31 DIAGNOSIS — Z79.4 TYPE 2 DIABETES MELLITUS WITH HYPERGLYCEMIA, WITH LONG-TERM CURRENT USE OF INSULIN: ICD-10-CM

## 2020-03-31 DIAGNOSIS — E11.65 TYPE 2 DIABETES MELLITUS WITH HYPERGLYCEMIA, WITH LONG-TERM CURRENT USE OF INSULIN: ICD-10-CM

## 2020-03-31 RX ORDER — INSULIN GLARGINE 100 [IU]/ML
INJECTION, SOLUTION SUBCUTANEOUS
Qty: 4 BOX | Refills: 3 | Status: SHIPPED | OUTPATIENT
Start: 2020-03-31 | End: 2020-04-14 | Stop reason: SDUPTHER

## 2020-03-31 RX ORDER — METFORMIN HYDROCHLORIDE 1000 MG/1
1000 TABLET ORAL 2 TIMES DAILY WITH MEALS
Qty: 60 TABLET | Refills: 2 | Status: SHIPPED | OUTPATIENT
Start: 2020-03-31 | End: 2020-04-14 | Stop reason: SDUPTHER

## 2020-03-31 RX ORDER — LISINOPRIL 40 MG/1
40 TABLET ORAL DAILY
Qty: 30 TABLET | Refills: 11 | Status: SHIPPED | OUTPATIENT
Start: 2020-03-31 | End: 2020-04-14 | Stop reason: SDUPTHER

## 2020-03-31 RX ORDER — CARVEDILOL 12.5 MG/1
TABLET ORAL
Qty: 60 TABLET | Refills: 11 | Status: SHIPPED | OUTPATIENT
Start: 2020-03-31 | End: 2020-04-14 | Stop reason: SDUPTHER

## 2020-03-31 RX ORDER — ATORVASTATIN CALCIUM 80 MG/1
80 TABLET, FILM COATED ORAL DAILY
Qty: 30 TABLET | Refills: 3 | Status: SHIPPED | OUTPATIENT
Start: 2020-03-31 | End: 2020-04-14 | Stop reason: SDUPTHER

## 2020-03-31 NOTE — TELEPHONE ENCOUNTER
Spoke with pt to reschedule his appt and he declines 4/7/20 but agreeable to 4/14/20.    Verbalized understanding but requested that all of his medications are filled as he will be out by the 4/14/20.

## 2020-04-14 ENCOUNTER — OFFICE VISIT (OUTPATIENT)
Dept: INTERNAL MEDICINE | Facility: CLINIC | Age: 55
End: 2020-04-14
Payer: COMMERCIAL

## 2020-04-14 ENCOUNTER — LAB VISIT (OUTPATIENT)
Dept: LAB | Facility: HOSPITAL | Age: 55
End: 2020-04-14
Attending: INTERNAL MEDICINE
Payer: COMMERCIAL

## 2020-04-14 VITALS
DIASTOLIC BLOOD PRESSURE: 80 MMHG | WEIGHT: 273.81 LBS | HEART RATE: 68 BPM | BODY MASS INDEX: 35.14 KG/M2 | TEMPERATURE: 98 F | SYSTOLIC BLOOD PRESSURE: 126 MMHG | HEIGHT: 74 IN

## 2020-04-14 DIAGNOSIS — E11.29 DIABETES MELLITUS WITH MICROALBUMINURIA: ICD-10-CM

## 2020-04-14 DIAGNOSIS — E11.9 DIABETES MELLITUS WITH INSULIN THERAPY: Primary | ICD-10-CM

## 2020-04-14 DIAGNOSIS — E11.59 HYPERTENSION ASSOCIATED WITH DIABETES: ICD-10-CM

## 2020-04-14 DIAGNOSIS — I15.2 HYPERTENSION ASSOCIATED WITH DIABETES: ICD-10-CM

## 2020-04-14 DIAGNOSIS — Z79.4 DIABETES MELLITUS WITH INSULIN THERAPY: ICD-10-CM

## 2020-04-14 DIAGNOSIS — E11.65 TYPE 2 DIABETES MELLITUS WITH HYPERGLYCEMIA, WITH LONG-TERM CURRENT USE OF INSULIN: ICD-10-CM

## 2020-04-14 DIAGNOSIS — R80.9 DIABETES MELLITUS WITH MICROALBUMINURIA: ICD-10-CM

## 2020-04-14 DIAGNOSIS — Z79.4 DIABETES MELLITUS WITH INSULIN THERAPY: Primary | ICD-10-CM

## 2020-04-14 DIAGNOSIS — E11.9 TYPE 2 DIABETES MELLITUS WITHOUT COMPLICATION: ICD-10-CM

## 2020-04-14 DIAGNOSIS — Z79.4 TYPE 2 DIABETES MELLITUS WITH HYPERGLYCEMIA, WITH LONG-TERM CURRENT USE OF INSULIN: ICD-10-CM

## 2020-04-14 DIAGNOSIS — E11.9 DIABETES MELLITUS WITH INSULIN THERAPY: ICD-10-CM

## 2020-04-14 LAB
ANION GAP SERPL CALC-SCNC: 12 MMOL/L (ref 8–16)
BUN SERPL-MCNC: 20 MG/DL (ref 6–20)
CALCIUM SERPL-MCNC: 9 MG/DL (ref 8.7–10.5)
CHLORIDE SERPL-SCNC: 99 MMOL/L (ref 95–110)
CO2 SERPL-SCNC: 22 MMOL/L (ref 23–29)
CREAT SERPL-MCNC: 1.1 MG/DL (ref 0.5–1.4)
EST. GFR  (AFRICAN AMERICAN): >60 ML/MIN/1.73 M^2
EST. GFR  (NON AFRICAN AMERICAN): >60 ML/MIN/1.73 M^2
ESTIMATED AVG GLUCOSE: 329 MG/DL (ref 68–131)
GLUCOSE SERPL-MCNC: 357 MG/DL (ref 70–110)
HBA1C MFR BLD HPLC: 13.1 % (ref 4–5.6)
POTASSIUM SERPL-SCNC: 4 MMOL/L (ref 3.5–5.1)
SODIUM SERPL-SCNC: 133 MMOL/L (ref 136–145)

## 2020-04-14 PROCEDURE — 80048 BASIC METABOLIC PNL TOTAL CA: CPT

## 2020-04-14 PROCEDURE — 99999 PR PBB SHADOW E&M-EST. PATIENT-LVL III: ICD-10-PCS | Mod: PBBFAC,,, | Performed by: INTERNAL MEDICINE

## 2020-04-14 PROCEDURE — 99214 OFFICE O/P EST MOD 30 MIN: CPT | Mod: S$GLB,,, | Performed by: INTERNAL MEDICINE

## 2020-04-14 PROCEDURE — 99214 PR OFFICE/OUTPT VISIT, EST, LEVL IV, 30-39 MIN: ICD-10-PCS | Mod: S$GLB,,, | Performed by: INTERNAL MEDICINE

## 2020-04-14 PROCEDURE — 83036 HEMOGLOBIN GLYCOSYLATED A1C: CPT

## 2020-04-14 PROCEDURE — 99999 PR PBB SHADOW E&M-EST. PATIENT-LVL III: CPT | Mod: PBBFAC,,, | Performed by: INTERNAL MEDICINE

## 2020-04-14 PROCEDURE — 36415 COLL VENOUS BLD VENIPUNCTURE: CPT | Mod: PO

## 2020-04-14 RX ORDER — ATORVASTATIN CALCIUM 80 MG/1
80 TABLET, FILM COATED ORAL DAILY
Qty: 30 TABLET | Refills: 6 | OUTPATIENT
Start: 2020-04-14 | End: 2020-04-14 | Stop reason: SDUPTHER

## 2020-04-14 RX ORDER — METFORMIN HYDROCHLORIDE 1000 MG/1
1000 TABLET ORAL 2 TIMES DAILY WITH MEALS
Qty: 60 TABLET | Refills: 6 | Status: SHIPPED | OUTPATIENT
Start: 2020-04-14 | End: 2020-10-01

## 2020-04-14 RX ORDER — INSULIN GLARGINE 100 [IU]/ML
INJECTION, SOLUTION SUBCUTANEOUS
Qty: 4 BOX | Refills: 3 | OUTPATIENT
Start: 2020-04-14 | End: 2020-04-14 | Stop reason: SDUPTHER

## 2020-04-14 RX ORDER — INSULIN GLARGINE 100 [IU]/ML
INJECTION, SOLUTION SUBCUTANEOUS
Qty: 4 BOX | Refills: 3 | Status: SHIPPED | OUTPATIENT
Start: 2020-04-14 | End: 2020-04-17 | Stop reason: SDUPTHER

## 2020-04-14 RX ORDER — METFORMIN HYDROCHLORIDE 1000 MG/1
1000 TABLET ORAL 2 TIMES DAILY WITH MEALS
Qty: 60 TABLET | Refills: 6 | OUTPATIENT
Start: 2020-04-14 | End: 2020-04-14 | Stop reason: SDUPTHER

## 2020-04-14 RX ORDER — CARVEDILOL 12.5 MG/1
TABLET ORAL
Qty: 60 TABLET | Refills: 6 | OUTPATIENT
Start: 2020-04-14 | End: 2020-04-14 | Stop reason: SDUPTHER

## 2020-04-14 RX ORDER — CARVEDILOL 12.5 MG/1
TABLET ORAL
Qty: 60 TABLET | Refills: 6 | Status: ON HOLD | OUTPATIENT
Start: 2020-04-14 | End: 2020-07-06 | Stop reason: HOSPADM

## 2020-04-14 RX ORDER — LISINOPRIL 40 MG/1
40 TABLET ORAL DAILY
Qty: 30 TABLET | Refills: 6 | Status: ON HOLD | OUTPATIENT
Start: 2020-04-14 | End: 2020-10-15 | Stop reason: SDUPTHER

## 2020-04-14 RX ORDER — LISINOPRIL 40 MG/1
40 TABLET ORAL DAILY
Qty: 30 TABLET | Refills: 6 | OUTPATIENT
Start: 2020-04-14 | End: 2020-04-14 | Stop reason: SDUPTHER

## 2020-04-14 RX ORDER — ATORVASTATIN CALCIUM 80 MG/1
80 TABLET, FILM COATED ORAL DAILY
Qty: 30 TABLET | Refills: 6 | Status: ON HOLD | OUTPATIENT
Start: 2020-04-14 | End: 2020-10-15 | Stop reason: SDUPTHER

## 2020-04-14 NOTE — PROGRESS NOTES
History of present illness:  54-year-old male with hypertension, diabetes mellitus, dyslipidemia six-month follow-up.  The patient reports that all is well.  He denies any chest pain palpitations syncope cough shortness of breath.  He reports difficulty affording his Lantus insulin and wants to explore less expensive alternative.    Current medications:  All medications are noted and reviewed in the electronic medical record medication list.    Review of systems:  General: no fever, chills, generalized body aches. No unexpected weight loss.  Eyes:  No visual disturbances.  HEENT:  No hoarseness, dysphagia, ear pain.  Respiratory:  No cough, no shortness of breath.  Cardiovascular: no chest pain, palpitations, cough, exertional limb pain. No edema.  GI: no nausea, vomiting.  No abdominal pain. No change in bowel habits.  No melena, no hematochezia.  : no dysuria. No change in the color or character of the urine. No urinary frequency.  Neurologic:  No focal neurological complaints.  No headaches.  Skin:  No rashes or other concerns.    Past medical history, past surgical history, family medical history social history is are all noted and reviewed in the electronic medical record history sections.    Physical examination:  General:  Pleasant alert appropriately groomed gentleman acute distress.  Vital signs:  All noted reviewed is normal.  Blood pressure taken manually by this examiner is 108/76.  HEENT:  Normocephalic.  Neck supple no masses no thyromegaly.  Lungs:  Clear to auscultation.  Cardiovascular:  Regular rate rhythm.  No significant murmur.  No ischemic changes lower extremities.  Mental status:  Alert oriented affect mood all appropriate.    Impression:  Hypertension is well controlled.  Type 2 diabetes mellitus on insulin therapy probably not controlled.  Therapy.    Plan:  Update basic metabolic profile glycohemoglobin.  He will contact his insurance carrier and clarify less costly alternative to his  current insulin-Lantus.  Return to clinic in August of this year for annual health assessment.

## 2020-04-15 ENCOUNTER — TELEPHONE (OUTPATIENT)
Dept: INTERNAL MEDICINE | Facility: CLINIC | Age: 55
End: 2020-04-15

## 2020-04-15 NOTE — TELEPHONE ENCOUNTER
----- Message from Shama Mo sent at 4/15/2020  8:52 AM CDT -----  Contact: Spouse [phone]  Patient has the numbers that the office needs to call about his medications. The number is [phone] and fax number [phone]. This number is to help him get his medication cheaper.    Please call and advise.    Thank You

## 2020-04-17 ENCOUNTER — TELEPHONE (OUTPATIENT)
Dept: INTERNAL MEDICINE | Facility: CLINIC | Age: 55
End: 2020-04-17

## 2020-04-17 DIAGNOSIS — E11.65 TYPE 2 DIABETES MELLITUS WITH HYPERGLYCEMIA, WITH LONG-TERM CURRENT USE OF INSULIN: ICD-10-CM

## 2020-04-17 DIAGNOSIS — Z79.4 TYPE 2 DIABETES MELLITUS WITH HYPERGLYCEMIA, WITH LONG-TERM CURRENT USE OF INSULIN: ICD-10-CM

## 2020-04-17 RX ORDER — INSULIN GLARGINE 100 [IU]/ML
INJECTION, SOLUTION SUBCUTANEOUS
Qty: 4 BOX | Refills: 3 | Status: ON HOLD | OUTPATIENT
Start: 2020-04-17 | End: 2020-07-06 | Stop reason: HOSPADM

## 2020-04-17 NOTE — TELEPHONE ENCOUNTER
----- Message from Zonia Canales sent at 4/17/2020  3:53 PM CDT -----  Contact: Self [phone]  Patient states give him a call ASAP thanks, please advise

## 2020-04-17 NOTE — TELEPHONE ENCOUNTER
----- Message from Rishabh Delgado sent at 4/17/2020  9:13 AM CDT -----  Contact: Patient [phone]  RX request - refill or new RX.  Is this a refill or new RX:  Refill  RX name and strength:  insulin (LANTUS SOLOSTAR U-100 INSULIN) glargine 100 units/mL (    Pharmacy name and phone # [facility] [phone] Fax  [phone] Contact     Comments:  Requesting call to inform has been sent, stating running out.    Please call an advise  Thank you

## 2020-04-17 NOTE — TELEPHONE ENCOUNTER
Spoke with pt to advise that the refill request is pending and MD will send today.    Also advised that mail order pharmacies advise that it takes 72 hours to process once received electronically from the MD's office.    Verbalized understanding.

## 2020-05-07 ENCOUNTER — OFFICE VISIT (OUTPATIENT)
Dept: URGENT CARE | Facility: CLINIC | Age: 55
End: 2020-05-07
Payer: COMMERCIAL

## 2020-05-07 VITALS
RESPIRATION RATE: 16 BRPM | BODY MASS INDEX: 35.94 KG/M2 | HEART RATE: 95 BPM | WEIGHT: 280 LBS | OXYGEN SATURATION: 96 % | TEMPERATURE: 97 F | HEIGHT: 74 IN

## 2020-05-07 DIAGNOSIS — S91.209A AVULSED TOENAIL, INITIAL ENCOUNTER: Primary | ICD-10-CM

## 2020-05-07 PROCEDURE — 99214 PR OFFICE/OUTPT VISIT, EST, LEVL IV, 30-39 MIN: ICD-10-PCS | Mod: S$GLB,,, | Performed by: FAMILY MEDICINE

## 2020-05-07 PROCEDURE — 99214 OFFICE O/P EST MOD 30 MIN: CPT | Mod: S$GLB,,, | Performed by: FAMILY MEDICINE

## 2020-05-07 RX ORDER — CIPROFLOXACIN 500 MG/1
500 TABLET ORAL 2 TIMES DAILY
Qty: 14 TABLET | Refills: 0 | Status: SHIPPED | OUTPATIENT
Start: 2020-05-07 | End: 2020-05-14

## 2020-05-07 RX ORDER — MUPIROCIN 20 MG/G
OINTMENT TOPICAL
Qty: 22 G | Refills: 1 | Status: ON HOLD | OUTPATIENT
Start: 2020-05-07 | End: 2020-07-06

## 2020-05-07 NOTE — PATIENT INSTRUCTIONS
Wound Care  Taking proper care of your wound will help it heal. Your healthcare provider may show you how to clean and dress the wound. He or she will also explain how to tell if the wound is healing normally. If you are unsure of how to take care of the wound, be sure to clarify what dressing to use and how often you should change the bandages. Here are the basic steps.     A wound that's not healing normally may be dark in color or have white streaks.   Wash your hands  Tips for washing your hands include:  · Use liquid soap and lather for 2 minutes. Scrub between your fingers and under your nails.  · Rinse with warm water, keeping your fingers pointing down.  · Use a paper towel to dry your hands and to turn off the faucet.  Remove the used dressing  Here are suggestions for removing the dressing:  · If dressing changes cause you pain, be sure to take your pain medicine as prescribed by your healthcare provider 30 minutes before dressing changes.  · Set up your supplies.  · Put on disposable gloves if youre dressing a wound for someone else or your wound is infected.  · Loosen the tape by pulling gently toward the wound.  · Gently take off the old dressing. If the dressing is stuck to the wound, moisten it with saline (if available) or clean water.  · If you have a drain or tube in the wound, be careful not to pull on it.  · Remove the dressing 1 layer at a time and put it in a plastic bag.  · Remove your gloves.  Inspect and dress the wound  Check the wound carefully:  · Each time you change the dressing, inspect the wound carefully to be sure its healing normally by making sure your wound appears to be pink and moist, and is free of infection.    · Wash your hands again. Put on a new pair of gloves.  · Clean and dress the wound as directed by your healthcare provider or nurse. Do not put anything in the wound that is not prescribed or directed by your healthcare provider. If you have a drain or tube, be  careful not to pull on it. Make sure to secure the drain or tube as well.  · Put all supplies in a plastic bag. Seal the bag and put it in the trash.  · Be sure to wash your hands again.  Call your healthcare provider  Call your healthcare provider if you see any of the following signs of a problem:  · Bleeding that soaks the dressing  · Pink fluid weeping from the wound  · Increased drainage or drainage that is yellow, yellow-green, or foul-smelling  · Increased swelling or pain, or redness or swelling in the skin around the wound  · A change in the color of the wound, or if streaks develop in a direction away from the wound  · The area between any stitches opens up  · An increase in the size of the wound  · A fever of 100.4°F (38°C) or higher, or as directed by your healthcare provider  · Chills, increased fatigue, or a loss of appetite      Date Last Reviewed: 7/30/2015  © 8529-5499 [employer] [address] [place] [address], [place] [address]. All rights reserved. This information is not intended as a substitute for professional medical care. Always follow your healthcare professional's instructions.

## 2020-05-07 NOTE — PROGRESS NOTES
"Subjective:       Patient ID: Billy Rivera is a 54 y.o. male.    Vitals:  height is 6' 2" (1.88 m) and weight is 127 kg (280 lb). His temperature is 97.2 °F (36.2 °C). His pulse is 95. His respiration is 16 and oxygen saturation is 96%.     Chief Complaint: Toe Pain    Pt has blister on LT big toe and toe nail on RT toe looks like it's going to fall off.  Patient is poor control diabetic with neuropathy in his lower legs, denies any injury or trauma patient has a history of surgery to a right foot 2 years ago and he was    Toe Pain    The incident occurred 3 to 5 days ago (3 days). There was no injury mechanism. The pain is present in the left toes and right toes. The quality of the pain is described as aching. The pain is at a severity of 1/10. The pain is mild. He reports no foreign bodies present. Nothing aggravates the symptoms. He has tried nothing for the symptoms.       Constitution: Negative for chills, fatigue and fever.   HENT: Negative for congestion and sore throat.    Neck: Negative for painful lymph nodes.   Cardiovascular: Negative for chest pain and leg swelling.   Eyes: Negative for double vision and blurred vision.   Respiratory: Negative for cough and shortness of breath.    Gastrointestinal: Negative for nausea, vomiting and diarrhea.   Genitourinary: Negative for dysuria, frequency and urgency.   Musculoskeletal: Positive for joint pain and joint swelling. Negative for muscle cramps and muscle ache.   Skin: Negative for color change, pale and rash.   Allergic/Immunologic: Negative for seasonal allergies.   Neurological: Negative for dizziness, history of vertigo, light-headedness, passing out and headaches.   Hematologic/Lymphatic: Negative for swollen lymph nodes, easy bruising/bleeding and history of blood clots. Does not bruise/bleed easily.   Psychiatric/Behavioral: Negative for nervous/anxious, sleep disturbance and depression. The patient is not nervous/anxious.        Objective: "      Physical Exam   Constitutional: He is oriented to person, place, and time. He appears well-developed and well-nourished. He is cooperative.  Non-toxic appearance. He does not appear ill. No distress.   HENT:   Head: Normocephalic and atraumatic.   Right Ear: Hearing, tympanic membrane, external ear and ear canal normal.   Left Ear: Hearing, tympanic membrane, external ear and ear canal normal.   Nose: Nose normal. No mucosal edema, rhinorrhea or nasal deformity. No epistaxis. Right sinus exhibits no maxillary sinus tenderness and no frontal sinus tenderness. Left sinus exhibits no maxillary sinus tenderness and no frontal sinus tenderness.   Mouth/Throat: Uvula is midline, oropharynx is clear and moist and mucous membranes are normal. No trismus in the jaw. Normal dentition. No uvula swelling. No posterior oropharyngeal erythema.   Eyes: Conjunctivae and lids are normal. Right eye exhibits no discharge. Left eye exhibits no discharge. No scleral icterus.   Neck: Trachea normal, normal range of motion, full passive range of motion without pain and phonation normal. Neck supple.   Cardiovascular: Normal rate, regular rhythm, normal heart sounds, intact distal pulses and normal pulses.   Pulmonary/Chest: Effort normal and breath sounds normal. No respiratory distress.   Abdominal: Soft. Normal appearance and bowel sounds are normal. He exhibits no distension, no pulsatile midline mass and no mass. There is no tenderness.   Musculoskeletal: Normal range of motion. He exhibits no edema or deformity.   Right big toe with the almost avulsed toenail hanging at the base remove using hemostat wound cleaned and triple antibiotic gauze Coban placed  Left big toe with a small blood blister no bleeding no sign of infection   Neurological: He is alert and oriented to person, place, and time. He exhibits normal muscle tone. Coordination normal.   Skin: Skin is warm, dry, intact, not diaphoretic and not pale.   Psychiatric: He  has a normal mood and affect. His speech is normal and behavior is normal. Judgment and thought content normal. Cognition and memory are normal.   Nursing note and vitals reviewed.        Assessment:       1. Avulsed toenail, initial encounter        Plan:         Avulsed toenail, initial encounter    Other orders  -     mupirocin (BACTROBAN) 2 % ointment; Apply to affected area 3 times daily  Dispense: 22 g; Refill: 1  -     ciprofloxacin HCl (CIPRO) 500 MG tablet; Take 1 tablet (500 mg total) by mouth 2 (two) times daily. for 7 days  Dispense: 14 tablet; Refill: 0        Pt advised wound care instruction    RTC in one week for recheck

## 2020-05-12 ENCOUNTER — OFFICE VISIT (OUTPATIENT)
Dept: URGENT CARE | Facility: CLINIC | Age: 55
End: 2020-05-12
Payer: COMMERCIAL

## 2020-05-12 VITALS
TEMPERATURE: 97 F | HEIGHT: 74 IN | OXYGEN SATURATION: 96 % | WEIGHT: 280 LBS | HEART RATE: 66 BPM | BODY MASS INDEX: 35.94 KG/M2

## 2020-05-12 DIAGNOSIS — T14.8XXA WOUND INFECTION: Primary | ICD-10-CM

## 2020-05-12 DIAGNOSIS — L08.9 WOUND INFECTION: Primary | ICD-10-CM

## 2020-05-12 PROCEDURE — 99212 OFFICE O/P EST SF 10 MIN: CPT | Mod: S$GLB,,, | Performed by: NURSE PRACTITIONER

## 2020-05-12 PROCEDURE — 99212 PR OFFICE/OUTPT VISIT, EST, LEVL II, 10-19 MIN: ICD-10-PCS | Mod: S$GLB,,, | Performed by: NURSE PRACTITIONER

## 2020-05-12 RX ORDER — ALCOHOL 2.38 KG/3.79L
GEL TOPICAL
Qty: 30 CAPSULE | Refills: 3 | Status: ON HOLD | OUTPATIENT
Start: 2020-05-12 | End: 2020-07-06

## 2020-05-12 NOTE — PROGRESS NOTES
"Subjective:       Patient ID: Billy Rivera is a 54 y.o. male.    Vitals:  height is 6' 2" (1.88 m) and weight is 127 kg (280 lb). His temperature is 97.4 °F (36.3 °C). His pulse is 66. His oxygen saturation is 96%.     Chief Complaint: Ingrown Toenail (Follow Up)    This is a 54 y.o. male who presents today with a chief complaint of needing a follow up from his ingrown toenail that he came in for last Tuesday on 05/07/2020 that the he states that the provider told him to follow up for.  Pt states wound on right big toe healing well, no drainage, using bactroban bid  Patient also requesting some sort of vitamin for overall benefit he is diabetic and has severe neuropathy      Ingrown Toenail   This is a new problem. The current episode started 1 to 4 weeks ago. The problem occurs constantly. The problem has been gradually improving. Pertinent negatives include no arthralgias, chills, coughing, fever, joint swelling, rash or sore throat. Nothing aggravates the symptoms. Treatments tried: cipro & muprocin. The treatment provided moderate relief.       Constitution: Negative for chills and fever.   HENT: Negative for facial swelling and sore throat.    Neck: Negative for painful lymph nodes.   Eyes: Negative for eye itching and eyelid swelling.   Respiratory: Negative for cough.    Musculoskeletal: Negative for joint pain and joint swelling.   Skin: Negative for color change, pale, rash, wound, abrasion, laceration, lesion, skin thickening/induration, puncture wound, erythema, abscess, avulsion and hives.        Ingrown Toenail   Allergic/Immunologic: Negative for environmental allergies, immunocompromised state and hives.   Hematologic/Lymphatic: Negative for swollen lymph nodes.       Objective:      Physical Exam   Constitutional: He is oriented to person, place, and time. He appears well-developed and well-nourished. He is cooperative.  Non-toxic appearance. He does not appear ill. No distress.   HENT: "   Head: Normocephalic and atraumatic.   Right Ear: Hearing, tympanic membrane, external ear and ear canal normal.   Left Ear: Hearing, tympanic membrane, external ear and ear canal normal.   Nose: Nose normal. No mucosal edema, rhinorrhea or nasal deformity. No epistaxis. Right sinus exhibits no maxillary sinus tenderness and no frontal sinus tenderness. Left sinus exhibits no maxillary sinus tenderness and no frontal sinus tenderness.   Mouth/Throat: Uvula is midline, oropharynx is clear and moist and mucous membranes are normal. No trismus in the jaw. Normal dentition. No uvula swelling. No posterior oropharyngeal erythema.   Eyes: Conjunctivae and lids are normal. Right eye exhibits no discharge. Left eye exhibits no discharge. No scleral icterus.   Neck: Trachea normal, normal range of motion, full passive range of motion without pain and phonation normal. Neck supple.   Cardiovascular: Normal rate, regular rhythm, normal heart sounds, intact distal pulses and normal pulses.   Pulmonary/Chest: Effort normal and breath sounds normal. No respiratory distress.   Abdominal: Soft. Normal appearance and bowel sounds are normal. He exhibits no distension, no pulsatile midline mass and no mass. There is no tenderness.   Musculoskeletal: Normal range of motion. He exhibits no edema or deformity.   Right big toe with avulsed nail D metrics looks well healing no sign of infection dressing and bandage applied   Neurological: He is alert and oriented to person, place, and time. He exhibits normal muscle tone. Coordination normal.   Skin: Skin is warm, dry, intact, not diaphoretic and not pale. erythema  Psychiatric: He has a normal mood and affect. His speech is normal and behavior is normal. Judgment and thought content normal. Cognition and memory are normal.   Nursing note and vitals reviewed.        Assessment:       1. Wound infection        Plan:         Wound infection    Other orders  -     [id number]  oil (METANX/FOLTANX RF) 3 mg-35 mg-2 mg -90.314 mg Cap; Take one po daily  Dispense: 30 capsule; Refill: 3        patient advised to continue wound dressing Bactroban until completely healed

## 2020-05-15 ENCOUNTER — TELEPHONE (OUTPATIENT)
Dept: URGENT CARE | Facility: CLINIC | Age: 55
End: 2020-05-15

## 2020-06-28 ENCOUNTER — OFFICE VISIT (OUTPATIENT)
Dept: URGENT CARE | Facility: CLINIC | Age: 55
End: 2020-06-28
Payer: COMMERCIAL

## 2020-06-28 VITALS
BODY MASS INDEX: 35.94 KG/M2 | WEIGHT: 280 LBS | HEIGHT: 74 IN | RESPIRATION RATE: 18 BRPM | DIASTOLIC BLOOD PRESSURE: 63 MMHG | HEART RATE: 78 BPM | OXYGEN SATURATION: 97 % | TEMPERATURE: 98 F | SYSTOLIC BLOOD PRESSURE: 112 MMHG

## 2020-06-28 DIAGNOSIS — S91.102A OPEN WOUND OF LEFT GREAT TOE, INITIAL ENCOUNTER: Primary | ICD-10-CM

## 2020-06-28 DIAGNOSIS — L08.9 TOE INFECTION: ICD-10-CM

## 2020-06-28 PROCEDURE — 73660 XR TOE 2 OR MORE VIEWS LEFT: ICD-10-PCS | Mod: FY,LT,S$GLB, | Performed by: RADIOLOGY

## 2020-06-28 PROCEDURE — 99214 OFFICE O/P EST MOD 30 MIN: CPT | Mod: S$GLB,,, | Performed by: PHYSICIAN ASSISTANT

## 2020-06-28 PROCEDURE — 99214 PR OFFICE/OUTPT VISIT, EST, LEVL IV, 30-39 MIN: ICD-10-PCS | Mod: S$GLB,,, | Performed by: PHYSICIAN ASSISTANT

## 2020-06-28 PROCEDURE — 73660 X-RAY EXAM OF TOE(S): CPT | Mod: FY,LT,S$GLB, | Performed by: RADIOLOGY

## 2020-06-28 RX ORDER — CLINDAMYCIN HYDROCHLORIDE 300 MG/1
300 CAPSULE ORAL EVERY 8 HOURS
Qty: 21 CAPSULE | Refills: 0 | Status: ON HOLD | OUTPATIENT
Start: 2020-06-28 | End: 2020-07-06 | Stop reason: HOSPADM

## 2020-06-28 RX ORDER — CLINDAMYCIN PHOSPHATE 150 MG/ML
600 INJECTION, SOLUTION INTRAVENOUS
Status: COMPLETED | OUTPATIENT
Start: 2020-06-28 | End: 2020-06-28

## 2020-06-28 RX ADMIN — CLINDAMYCIN PHOSPHATE 600 MG: 150 INJECTION, SOLUTION INTRAVENOUS at 02:06

## 2020-06-28 NOTE — LETTER
June 28, 2020      [facility] [place] [address]  Phone: [phone]  Fax: [phone]       Patient: Billy Rivera   YOB: 1965  Date of Visit: 06/28/2020    To Whom It May Concern:    Sharron Rivera  was at [facility] on 06/28/2020. He may return to work/school on 6/30/2020 with no restrictions. If you have any questions or concerns, or if I can be of further assistance, please do not hesitate to contact me.    Sincerely,    Zoila Qiu PA-C

## 2020-06-28 NOTE — PATIENT INSTRUCTIONS
PLEASE READ YOUR DISCHARGE INSTRUCTIONS ENTIRELY AS IT CONTAINS IMPORTANT INFORMATION.  A referral to podiatry was placed for you.   Please call [phone] tomorrow morning & request a same day appt.  - Rest.    - Drink plenty of fluids.    - Tylenol or Ibuprofen as directed as needed for fever/pain.    - If you were prescribed antibiotics, please take them to completion.  - If you are female and on birth control pills - please use additional methods of contraception to prevent pregnancy while on antibiotics and for one cycle after.   - If you were prescribed a narcotic medication or muscle relaxer, do not drive or operate heavy equipment or machinery while taking these medications, as they can cause drowsiness.   - If you smoke, please stop smoking.  -You must understand that you've received an Urgent Care treatment only and that you may be released before all your medical problems are known or treated. You, the patient, will    arrange for follow up care as instructed. Please arrange follow up with your primary medical clinic as soon as possible.   - Follow up with your PCP or specialty clinic as directed in the next 1-2 weeks if not improved or as needed.  You can call [phone] to schedule an appointment with the appropriate provider.    - Please return to Urgent Care or to the Emergency Department if your symptoms worsen.    Patient aware and verbalized understanding.    Wound Check, Infection  You have a wound that has become infected. The wound will not heal properly unless the infection is cleared. Infection in a wound may also spread if it is not treated. In most cases, antibiotic medicines are prescribed to treat a wound infection.   Symptoms of a wound infection include:  · Redness or swelling around the wound  · Warmth coming from the wound  · New or worsening pain  · Red streaks around the wound  · Draining pus  · Fever  Home care  Follow all directions you are given to treat the  infection.  Medicines  Take all medicines as prescribed.   · If you were given antibiotics, take them until they are gone or your healthcare provider tells you to stop. It is vital to finish the antibiotics even if you feel better. If you do not finish them, the infection may come back and be harder to treat.  · If your infection is not responding to the medicines you are taking, you may be prescribed new medicines.  · Take medicine for pain as directed by your healthcare provider.  Wound care  Care for your wound as directed by your healthcare provider.  · Apply a warm compress (clean cloth soaked in hot water) to the infected area for about 5 to 10 minutes at a time. Be very careful not to burn yourself. Test the cloth on a non-infected area to make sure it is not too hot.  · Continue to change the dressing daily. If it becomes wet, stained with wound fluid, or dirty, change it sooner. To change it:  ¨ Wash your hands with soap and water before changing the dressing.  ¨ Carefully remove the dressing and tape. If it sticks to the wound, you may need to wet it a little to remove it. (Do not do this if your healthcare provider has told you not to.)  ¨ Gently clean the wound with clean water (or saline) using gauze, a clean washcloth, or cotton swab.  ¨ Do not use soap, alcohol, peroxide or other cleansers.  ¨ If you were told to dry the wound before putting on a new dressing, gently pat. Do not rub.  ¨ Throw out the old dressing.  ¨ Wash your hands again before opening the new, clean dressing.  ¨ Wash your hands again when you are done.  Follow-up care  Follow up with your healthcare provider as advised. If a culture was done, you will be notified if your treatment needs to change. Call as directed for the results.  When to seek medical advice  Call your health care provider right away if any of these occur:  · Symptoms of infection don't start to improve within 2 days of starting antibiotics  · Symptoms of infection  get worse  · New symptoms, such as red streaks around the wound  · Fever of 100.4°F (38.0°C) or higher for more than 2 days after starting the antibiotics  Date Last Reviewed: 8/10/2015  © 3004-9171 [employer]. [address] [place] [address]. All rights reserved. This information is not intended as a substitute for professional medical care. Always follow your healthcare professional's instructions.

## 2020-06-28 NOTE — PROGRESS NOTES
"Subjective:       Patient ID: Billy Rivera is a 55 y.o. male.    Vitals:  height is 6' 2" (1.88 m) and weight is 127 kg (280 lb). His temperature is 97.5 °F (36.4 °C). His blood pressure is 112/63 and his pulse is 78. His respiration is 18 and oxygen saturation is 97%.     Chief Complaint: Nail Problem    Mr. Rivera presents for evaluation of left great toe infection.  He has a history of diabetes which is uncontrolled.  He reports approximately a month ago, he started wearing new boots and a rubbed his toenail.  His toenail became inflamed and his wife ripped part of the nail off.  He has had inflammation and erythema for the past month.  About a week ago, his toe started draining pus type fluid.  He denies any fevers or chills, sweats or fatigue.  He reports he does have a podiatrist, but has not seen them in some time.  He has been using gentian violet on his toe and soaking the toe in Epson salts.    Nail Problem  This is a new problem. The current episode started 1 to 4 weeks ago. The problem occurs intermittently. The problem has been unchanged. Pertinent negatives include no arthralgias, chest pain, chills, congestion, coughing, fatigue, fever, headaches, joint swelling, myalgias, nausea, rash, sore throat, vertigo or vomiting. Nothing aggravates the symptoms. Treatments tried: gentian violet.       Constitution: Negative for chills, fatigue and fever.   HENT: Negative for congestion and sore throat.    Neck: Negative for painful lymph nodes.   Cardiovascular: Negative for chest pain and leg swelling.   Eyes: Negative for double vision and blurred vision.   Respiratory: Negative for cough and shortness of breath.    Gastrointestinal: Negative for nausea, vomiting and diarrhea.   Genitourinary: Negative for dysuria, frequency and urgency.   Musculoskeletal: Negative for joint pain, joint swelling, muscle cramps and muscle ache.   Skin: Positive for wound and erythema. Negative for color change, pale, " rash, abrasion, laceration, lesion, skin thickening/induration, puncture wound, bruising, abscess, avulsion and hives.   Allergic/Immunologic: Negative for seasonal allergies and hives.   Neurological: Negative for dizziness, history of vertigo, light-headedness, passing out and headaches.   Hematologic/Lymphatic: Negative for swollen lymph nodes, easy bruising/bleeding and history of blood clots. Does not bruise/bleed easily.   Psychiatric/Behavioral: Negative for nervous/anxious, sleep disturbance and depression. The patient is not nervous/anxious.        Objective:      Physical Exam   Constitutional: He is oriented to person, place, and time. He appears well-developed. He is cooperative.  Non-toxic appearance. He does not appear ill. No distress.   HENT:   Head: Normocephalic and atraumatic.   Right Ear: Hearing and external ear normal.   Left Ear: Hearing and external ear normal.   Nose: Nose normal. No rhinorrhea or nasal deformity. No epistaxis.   Mouth/Throat: Mucous membranes are normal.   Eyes: Conjunctivae and lids are normal. Right eye exhibits no discharge. Left eye exhibits no discharge. No scleral icterus.   Neck: Trachea normal, normal range of motion, full passive range of motion without pain and phonation normal. Neck supple.   Cardiovascular: Normal rate, regular rhythm, normal heart sounds and normal pulses.   Pulmonary/Chest: Effort normal and breath sounds normal. No respiratory distress.   Abdominal: Soft. Normal appearance and bowel sounds are normal. He exhibits no distension, no pulsatile midline mass and no mass. There is no abdominal tenderness.   Musculoskeletal: Normal range of motion.         General: No deformity.      Left foot: Normal range of motion and normal capillary refill. Tenderness and swelling present. No bony tenderness, crepitus, deformity or laceration.        Feet:       Comments: Mild tenderness throughout left great toe.  Difficult to assess tissue, as it is stained  with gentian violet - attempted to scrub it off with hibiclens without success.  Wound at tip of toe, top part of toenail is absent.  ROM intact, cap refill intact, DP & PT pulse 2+.  Entire toe with edema & erythema.   See pictures.   Neurological: He is alert and oriented to person, place, and time. He exhibits normal muscle tone. Coordination normal.   Skin: Skin is warm, dry, intact, not diaphoretic and not pale. not left footLesions:  erythema  Psychiatric: His speech is normal and behavior is normal. Judgment and thought content normal.   Nursing note and vitals reviewed.                  XR L toe - No convincing significant osseous erosive or destructive process of the great toe noting comparison with any previous examinations would be helpful given inherent irregularity of the tuft.  There is irregularity of the nail, infection or trauma to the region is not excluded, correlation is advised.  If there is continued concern for osteomyelitis, MRI could be performed for further evaluation if patient compatible.    Assessment:       1. Open wound of left great toe, initial encounter    2. Toe infection        Plan:         Open wound of left great toe, initial encounter    Toe infection  -     X-Ray Toe 2 or More Views Left; Future; Expected date: 06/28/2020  -     Ambulatory referral/consult to Podiatry    Other orders  -     clindamycin injection 600 mg  -     clindamycin (CLEOCIN) 300 MG capsule; Take 1 capsule (300 mg total) by mouth every 8 (eight) hours. for 7 days  Dispense: 21 capsule; Refill: 0    Patient is an uncontrolled diabetic with left great toe wound x 1 month.  It is draining purulent fluid.  XR does not show osteomyelitis.  He will need to follow up with podiatry ASAP, toe appears to need debridement.  Urgent referral placed.     Patient Instructions   PLEASE READ YOUR DISCHARGE INSTRUCTIONS ENTIRELY AS IT CONTAINS IMPORTANT INFORMATION.  A referral to podiatry was placed for you.   Please  call [phone] tomorrow morning & request a same day appt.  - Rest.    - Drink plenty of fluids.    - Tylenol or Ibuprofen as directed as needed for fever/pain.    - If you were prescribed antibiotics, please take them to completion.  - If you are female and on birth control pills - please use additional methods of contraception to prevent pregnancy while on antibiotics and for one cycle after.   - If you were prescribed a narcotic medication or muscle relaxer, do not drive or operate heavy equipment or machinery while taking these medications, as they can cause drowsiness.   - If you smoke, please stop smoking.  -You must understand that you've received an Urgent Care treatment only and that you may be released before all your medical problems are known or treated. You, the patient, will    arrange for follow up care as instructed. Please arrange follow up with your primary medical clinic as soon as possible.   - Follow up with your PCP or specialty clinic as directed in the next 1-2 weeks if not improved or as needed.  You can call [phone] to schedule an appointment with the appropriate provider.    - Please return to Urgent Care or to the Emergency Department if your symptoms worsen.    Patient aware and verbalized understanding.    Wound Check, Infection  You have a wound that has become infected. The wound will not heal properly unless the infection is cleared. Infection in a wound may also spread if it is not treated. In most cases, antibiotic medicines are prescribed to treat a wound infection.   Symptoms of a wound infection include:  · Redness or swelling around the wound  · Warmth coming from the wound  · New or worsening pain  · Red streaks around the wound  · Draining pus  · Fever  Home care  Follow all directions you are given to treat the infection.  Medicines  Take all medicines as prescribed.   · If you were given antibiotics, take them until they are gone or your healthcare provider  tells you to stop. It is vital to finish the antibiotics even if you feel better. If you do not finish them, the infection may come back and be harder to treat.  · If your infection is not responding to the medicines you are taking, you may be prescribed new medicines.  · Take medicine for pain as directed by your healthcare provider.  Wound care  Care for your wound as directed by your healthcare provider.  · Apply a warm compress (clean cloth soaked in hot water) to the infected area for about 5 to 10 minutes at a time. Be very careful not to burn yourself. Test the cloth on a non-infected area to make sure it is not too hot.  · Continue to change the dressing daily. If it becomes wet, stained with wound fluid, or dirty, change it sooner. To change it:  ¨ Wash your hands with soap and water before changing the dressing.  ¨ Carefully remove the dressing and tape. If it sticks to the wound, you may need to wet it a little to remove it. (Do not do this if your healthcare provider has told you not to.)  ¨ Gently clean the wound with clean water (or saline) using gauze, a clean washcloth, or cotton swab.  ¨ Do not use soap, alcohol, peroxide or other cleansers.  ¨ If you were told to dry the wound before putting on a new dressing, gently pat. Do not rub.  ¨ Throw out the old dressing.  ¨ Wash your hands again before opening the new, clean dressing.  ¨ Wash your hands again when you are done.  Follow-up care  Follow up with your healthcare provider as advised. If a culture was done, you will be notified if your treatment needs to change. Call as directed for the results.  When to seek medical advice  Call your health care provider right away if any of these occur:  · Symptoms of infection don't start to improve within 2 days of starting antibiotics  · Symptoms of infection get worse  · New symptoms, such as red streaks around the wound  · Fever of 100.4°F (38.0°C) or higher for more than 2 days after starting the  antibiotics  Date Last Reviewed: 8/10/2015  © 1851-1830 [employer] [address] [place] [address], [place] [address]. All rights reserved. This information is not intended as a substitute for professional medical care. Always follow your healthcare professional's instructions.

## 2020-06-29 ENCOUNTER — OFFICE VISIT (OUTPATIENT)
Dept: PODIATRY | Facility: CLINIC | Age: 55
End: 2020-06-29
Payer: COMMERCIAL

## 2020-06-29 VITALS
DIASTOLIC BLOOD PRESSURE: 85 MMHG | HEART RATE: 75 BPM | BODY MASS INDEX: 35.27 KG/M2 | WEIGHT: 274.69 LBS | SYSTOLIC BLOOD PRESSURE: 128 MMHG

## 2020-06-29 DIAGNOSIS — L02.612 ABSCESS, TOE, LEFT: ICD-10-CM

## 2020-06-29 DIAGNOSIS — Z79.4 TYPE 2 DIABETES MELLITUS WITH HYPERGLYCEMIA, WITH LONG-TERM CURRENT USE OF INSULIN: ICD-10-CM

## 2020-06-29 DIAGNOSIS — M86.9 LEFT HALLUX OSTEOMYELITIS: Primary | ICD-10-CM

## 2020-06-29 DIAGNOSIS — L60.0 INGROWN NAIL: ICD-10-CM

## 2020-06-29 DIAGNOSIS — E11.65 TYPE 2 DIABETES MELLITUS WITH HYPERGLYCEMIA, WITH LONG-TERM CURRENT USE OF INSULIN: ICD-10-CM

## 2020-06-29 PROCEDURE — 87075 CULTR BACTERIA EXCEPT BLOOD: CPT

## 2020-06-29 PROCEDURE — 99214 PR OFFICE/OUTPT VISIT, EST, LEVL IV, 30-39 MIN: ICD-10-PCS | Mod: 25,S$GLB,, | Performed by: PODIATRIST

## 2020-06-29 PROCEDURE — 87186 SC STD MICRODIL/AGAR DIL: CPT

## 2020-06-29 PROCEDURE — 88307 TISSUE EXAM BY PATHOLOGIST: CPT | Mod: 26,,, | Performed by: PATHOLOGY

## 2020-06-29 PROCEDURE — 88307 TISSUE EXAM BY PATHOLOGIST: CPT | Performed by: PATHOLOGY

## 2020-06-29 PROCEDURE — 11044 PR DEBRIDEMENT, SKIN, SUB-Q TISSUE,MUSCLE,BONE,=<20 SQ CM: ICD-10-PCS | Mod: S$GLB,,, | Performed by: PODIATRIST

## 2020-06-29 PROCEDURE — 99999 PR PBB SHADOW E&M-EST. PATIENT-LVL III: CPT | Mod: PBBFAC,,, | Performed by: PODIATRIST

## 2020-06-29 PROCEDURE — 99999 PR PBB SHADOW E&M-EST. PATIENT-LVL III: ICD-10-PCS | Mod: PBBFAC,,, | Performed by: PODIATRIST

## 2020-06-29 PROCEDURE — 87070 CULTURE OTHR SPECIMN AEROBIC: CPT

## 2020-06-29 PROCEDURE — 10061 PR DRAIN SKIN ABSCESS COMPLIC: ICD-10-PCS | Mod: 59,S$GLB,, | Performed by: PODIATRIST

## 2020-06-29 PROCEDURE — 99214 OFFICE O/P EST MOD 30 MIN: CPT | Mod: 25,S$GLB,, | Performed by: PODIATRIST

## 2020-06-29 PROCEDURE — 87077 CULTURE AEROBIC IDENTIFY: CPT

## 2020-06-29 PROCEDURE — 10061 I&D ABSCESS COMP/MULTIPLE: CPT | Mod: 59,S$GLB,, | Performed by: PODIATRIST

## 2020-06-29 PROCEDURE — 88307 PR  SURG PATH,LEVEL V: ICD-10-PCS | Mod: 26,,, | Performed by: PATHOLOGY

## 2020-06-29 PROCEDURE — 87076 CULTURE ANAEROBE IDENT EACH: CPT

## 2020-06-29 PROCEDURE — 11044 DBRDMT BONE 1ST 20 SQ CM/<: CPT | Mod: S$GLB,,, | Performed by: PODIATRIST

## 2020-06-29 RX ORDER — CIPROFLOXACIN 500 MG/1
500 TABLET ORAL 2 TIMES DAILY
Qty: 20 TABLET | Refills: 0 | Status: ON HOLD | OUTPATIENT
Start: 2020-06-29 | End: 2020-07-06 | Stop reason: HOSPADM

## 2020-06-29 NOTE — LETTER
June 29, 2020      [facility] - Podiatry  [address]  [facility] [address]  Phone: [phone]       Patient: Billy Rivera   YOB: 1965  Date of Visit: 06/29/2020    To Whom It May Concern:    Sharron Rivera  was at [facility] on 06/29/2020. He may return to work/school on  Wednesday 07/31/2020 with no restrictions. If you have any questions or concerns, or if I can be of further assistance, please do not hesitate to contact me.    Sincerely,    Mingo Melara DPM

## 2020-06-29 NOTE — LETTER
June 29, 2020      Zoila Qiu PA-C  [address] [facility] [address]           [facility] - Podiatry  [address]  [place] [address]  Phone: [phone]          Patient: Billy Rivera   MR Number: [id number]   YOB: 1965   Date of Visit: 6/29/2020       Dear Zoila Qiu:    Thank you for referring Billy Rivera to me for evaluation. Attached you will find relevant portions of my assessment and plan of care.    If you have questions, please do not hesitate to call me. I look forward to following Billy Rivera along with you.    Sincerely,    Mingo Melara, DPRALPH    Enclosure  CC:  No Recipients    If you would like to receive this communication electronically, please contact [email or web address] or [phone] to request more information on [employer] access.    For providers and/or their staff who would like to refer a patient to [facility], please contact us through our one-stop-shop provider referral line, [facility], at [phone].    If you feel you have received this communication in error or would no longer like to receive these types of communications, please e-mail [email or web address]

## 2020-06-30 NOTE — PROGRESS NOTES
Subjective:      Patient ID: Billy Rivera is a 55 y.o. male.    Chief Complaint: Diabetes Mellitus (PCP 4/14/2020), Foot Problem, and Nail Care    Billy is a 55 y.o. male who presents to the clinic for evaluation and treatment of high risk feet. Billy has a past medical history of Allergy, CAD (coronary artery disease), native coronary artery (6/25/2013), Cancer, Diabetes mellitus, Diabetes mellitus, type 2, Disorder of kidney and ureter, Heart attack (04/2012), colon cancer, stage I, Hyperlipidemia, Hypertension, Muscular pain, and MICHAELA (obstructive sleep apnea).  He recently visited Urgent Care secondary to a left great toe ulceration in addition he was prescribed antibiotic as well as radiographs taken.  This was due to a new pair of shoes that were too tight.   This patient has documented high risk feet requiring routine maintenance secondary to diabetes mellitis and those secondary complications of diabetes, as mentioned..    PCP: BRAD Baker MD    Date Last Seen by PCP:   Chief Complaint   Patient presents with    Diabetes Mellitus     PCP 4/14/2020    Foot Problem    Nail Care         Current shoe gear:  Affected Foot: Casual shoes     Unaffected Foot: Casual shoes    Hemoglobin A1C   Date Value Ref Range Status   04/14/2020 13.1 (H) 4.0 - 5.6 % Final     Comment:     ADA Screening Guidelines:  5.7-6.4%  Consistent with prediabetes  >or=6.5%  Consistent with diabetes  High levels of fetal hemoglobin interfere with the HbA1C  assay. Heterozygous hemoglobin variants (HbS, HgC, etc)do  not significantly interfere with this assay.   However, presence of multiple variants may affect accuracy.     08/07/2019 12.5 (H) 4.0 - 5.6 % Final     Comment:     ADA Screening Guidelines:  5.7-6.4%  Consistent with prediabetes  >or=6.5%  Consistent with diabetes  High levels of fetal hemoglobin interfere with the HbA1C  assay. Heterozygous hemoglobin variants (HbS, HgC, etc)do  not significantly interfere  with this assay.   However, presence of multiple variants may affect accuracy.     06/02/2018 12.0 (H) 4.0 - 5.6 % Final     Comment:     ADA Screening Guidelines:  5.7-6.4%  Consistent with prediabetes  >or=6.5%  Consistent with diabetes  High levels of fetal hemoglobin interfere with the HbA1C  assay. Heterozygous hemoglobin variants (HbS, HgC, etc)do  not significantly interfere with this assay.   However, presence of multiple variants may affect accuracy.         Review of Systems   Constitution: Negative for chills, decreased appetite, fever and night sweats.   HENT: Negative for congestion, ear discharge, nosebleeds and tinnitus.    Eyes: Negative for double vision, pain and visual disturbance.   Cardiovascular: Negative for chest pain, claudication, cyanosis and palpitations.   Respiratory: Negative for cough, hemoptysis, shortness of breath and wheezing.    Endocrine: Negative for cold intolerance and heat intolerance.   Hematologic/Lymphatic: Negative for adenopathy and bleeding problem.   Skin: Positive for color change, dry skin, nail changes, suspicious lesions and unusual hair distribution.   Musculoskeletal: Positive for arthritis, joint pain and stiffness. Negative for myalgias.   Gastrointestinal: Negative for abdominal pain, dysphagia, nausea and vomiting.   Genitourinary: Negative for dysuria, flank pain, hematuria and pelvic pain.   Neurological: Positive for disturbances in coordination, numbness, paresthesias and sensory change.   Psychiatric/Behavioral: Negative for altered mental status, hallucinations and suicidal ideas. The patient does not have insomnia.    Allergic/Immunologic: Negative for environmental allergies and persistent infections.           Objective:      Physical Exam  Vitals signs reviewed.   Constitutional:       Appearance: He is well-developed.   HENT:      Head: Normocephalic and atraumatic.   Cardiovascular:      Pulses:           Dorsalis pedis pulses are 1+ on the right  side and 1+ on the left side.        Posterior tibial pulses are 1+ on the right side and 1+ on the left side.   Pulmonary:      Effort: Pulmonary effort is normal.   Musculoskeletal: Normal range of motion.      Comments: Anterior, lateral, and posterior muscle groups bilateral lower extremities show strength 4 over 5 symmetrically. Inspection and palpation of the joints and bones reveal no crepitus or joint effusion. No tenderness upon palpation. Mild plantar flexor contractures noted to digits 2 through 5 bilaterally.  Angle and base of gait are normal.   Feet:      Right foot:      Skin integrity: Callus and dry skin present.      Left foot:      Skin integrity: Callus and dry skin present.   Skin:     General: Skin is warm and dry.      Capillary Refill: Capillary refill takes 2 to 3 seconds.      Coloration: Skin is pale.      Comments: Skin bilateral lower extremities noted to be thin, dry, and atrophic.  Toenails thickened, discolored, with subungual fungal debris and tenderness noted.  Hyperkeratotic lesions noted to both feet plantarly with tenderness.  Left hallux nail is loose distally with distal tuft ulceration measuring 1.5 cm in diameter by 1 cm and depth probing to the distal phalanx.  Malodor noted.   Neurological:      Mental Status: He is alert and oriented to person, place, and time.      Sensory: Sensory deficit present.      Motor: Atrophy present.      Deep Tendon Reflexes: Reflexes abnormal.      Reflex Scores:       Patellar reflexes are 1+ on the right side and 1+ on the left side.       Achilles reflexes are 1+ on the right side and 1+ on the left side.     Comments: Sharp, dull, light touch, and vibratory sensation are diminished bilaterally. Proprioceptive sensation is intact to both lower extremities. Nickerson Roge monofilament exam shows loss of protective sensation to plantar toes 1 through 5 bilaterally. Deep tendon reflexes to the patellar tendons is 1 over 4 bilaterally  symmetrical. Deep tendon reflexes to the Achilles tendon is 0 over 4 bilaterally symmetrical. No ankle clonus or Babinski reflex noted bilaterally. Coordination is fair to both lower extremities.  Patient admits to intermittent burning and tingling in the feet.   Psychiatric:         Behavior: Behavior normal.               Assessment:       Encounter Diagnoses   Name Primary?    Left hallux osteomyelitis Yes    Type 2 diabetes mellitus with hyperglycemia, with long-term current use of insulin     Abscess, toe, left     Ingrown nail          Plan:       Billy was seen today for diabetes mellitus, foot problem and nail care.    Diagnoses and all orders for this visit:    Left hallux osteomyelitis  -     Culture, Anaerobic  -     Aerobic culture  -     Specimen to Pathology Orthopedics    Type 2 diabetes mellitus with hyperglycemia, with long-term current use of insulin    Abscess, toe, left    Ingrown nail    Other orders  -     ciprofloxacin HCl (CIPRO) 500 MG tablet; Take 1 tablet (500 mg total) by mouth 2 (two) times daily.      I counseled the patient on his conditions, their implications and medical management.      Temporary Nail Removal     Performed by:  Mingo Melara DPM     Consent Done?:  Yes (Written)     Location:   left hallux    Anesthesia:  Digital block  Local anesthetic: 0.5% Marcaine without epinephrine  Anesthetic total (ml):  4  Preparation:  Skin prepped with alcohol and skin prepped with Betadine     Amount removed:   total nail avulsion without matrixectomy  Wedge excision of skin of nail fold: No    Nail bed sutured?: No    Nail matrix removed:  No  Removed nail replaced and anchored: No    Dressing applied:  4x4, antibiotic ointment and dressing applied  Patient tolerance:  Patient tolerated the procedure well with no immediate complications    Digital nerve block applied to the above-mentioned toe. Toe was prepped and draped in a sterile fashion and penrose drain applied. Anesthesia  was confirmed and the offending portion of nail plate was freed from the nail bed and eponychium, and was then excised. The surgical site was then lavaged with copious amounts of 70% isopropyl alcohol. Penrose drain was removed and betadine ointment and dry sterile dressing applied. Post-op care instructions were discussed and dispensed to patient.    Incision and drainage was performed to the left distal hallux with apparent drainage noted and malodor.  The left hallux wound was noted to have purulence and was cultured.  Left distal hallux ulceration was then debrided with no anesthesia necessary secondary to level of neuropathy, this was noted to be probing to bone.  At this time and a rongeur was utilized to biopsy the left distal phalanx.  Healthy bleeding was apparent.  .   Begin topical Medihoney as well as a dry sterile dressing to be changed every other day.  Keep the area clean and dry with a football type dressing as well as a surgical shoe.  Follow-up in 10-14 days.  We will await cultures and Cipro was added to the clindamycin prescription.

## 2020-07-03 ENCOUNTER — ANESTHESIA EVENT (OUTPATIENT)
Dept: SURGERY | Facility: HOSPITAL | Age: 55
DRG: 617 | End: 2020-07-03
Payer: COMMERCIAL

## 2020-07-03 ENCOUNTER — HOSPITAL ENCOUNTER (INPATIENT)
Facility: HOSPITAL | Age: 55
LOS: 3 days | Discharge: HOME HEALTH | DRG: 617 | End: 2020-07-06
Attending: EMERGENCY MEDICINE | Admitting: HOSPITALIST
Payer: COMMERCIAL

## 2020-07-03 DIAGNOSIS — E11.9 DIABETES MELLITUS WITH INSULIN THERAPY: ICD-10-CM

## 2020-07-03 DIAGNOSIS — E11.3393 TYPE 2 DIABETES MELLITUS WITH BOTH EYES AFFECTED BY MODERATE NONPROLIFERATIVE RETINOPATHY WITHOUT MACULAR EDEMA, WITH LONG-TERM CURRENT USE OF INSULIN: ICD-10-CM

## 2020-07-03 DIAGNOSIS — R07.9 CHEST PAIN: ICD-10-CM

## 2020-07-03 DIAGNOSIS — M86.9 OSTEOMYELITIS OF GREAT TOE OF LEFT FOOT: Primary | ICD-10-CM

## 2020-07-03 DIAGNOSIS — Z79.4 DIABETES MELLITUS WITH INSULIN THERAPY: ICD-10-CM

## 2020-07-03 DIAGNOSIS — S98.112A AMPUTATION OF LEFT GREAT TOE: ICD-10-CM

## 2020-07-03 DIAGNOSIS — Z79.4 TYPE 2 DIABETES MELLITUS WITH BOTH EYES AFFECTED BY MODERATE NONPROLIFERATIVE RETINOPATHY WITHOUT MACULAR EDEMA, WITH LONG-TERM CURRENT USE OF INSULIN: ICD-10-CM

## 2020-07-03 LAB
ALBUMIN SERPL BCP-MCNC: 2.7 G/DL (ref 3.5–5.2)
ALP SERPL-CCNC: 130 U/L (ref 55–135)
ALT SERPL W/O P-5'-P-CCNC: 18 U/L (ref 10–44)
ANION GAP SERPL CALC-SCNC: 11 MMOL/L (ref 8–16)
APTT BLDCRRT: 26.5 SEC (ref 21–32)
AST SERPL-CCNC: 16 U/L (ref 10–40)
BASOPHILS # BLD AUTO: 0.03 K/UL (ref 0–0.2)
BASOPHILS NFR BLD: 0.2 % (ref 0–1.9)
BILIRUB SERPL-MCNC: 0.8 MG/DL (ref 0.1–1)
BUN SERPL-MCNC: 17 MG/DL (ref 6–20)
CALCIUM SERPL-MCNC: 8.9 MG/DL (ref 8.7–10.5)
CHLORIDE SERPL-SCNC: 97 MMOL/L (ref 95–110)
CO2 SERPL-SCNC: 23 MMOL/L (ref 23–29)
CREAT SERPL-MCNC: 1.2 MG/DL (ref 0.5–1.4)
DIFFERENTIAL METHOD: ABNORMAL
EOSINOPHIL # BLD AUTO: 0 K/UL (ref 0–0.5)
EOSINOPHIL NFR BLD: 0.2 % (ref 0–8)
ERYTHROCYTE [DISTWIDTH] IN BLOOD BY AUTOMATED COUNT: 12.3 % (ref 11.5–14.5)
ERYTHROCYTE [SEDIMENTATION RATE] IN BLOOD BY WESTERGREN METHOD: 73 MM/HR (ref 0–23)
EST. GFR  (AFRICAN AMERICAN): >60 ML/MIN/1.73 M^2
EST. GFR  (NON AFRICAN AMERICAN): >60 ML/MIN/1.73 M^2
ESTIMATED AVG GLUCOSE: 292 MG/DL (ref 68–131)
GLUCOSE SERPL-MCNC: 407 MG/DL (ref 70–110)
HBA1C MFR BLD HPLC: 11.8 % (ref 4–5.6)
HCT VFR BLD AUTO: 41 % (ref 40–54)
HGB BLD-MCNC: 13.6 G/DL (ref 14–18)
IMM GRANULOCYTES # BLD AUTO: 0.13 K/UL (ref 0–0.04)
IMM GRANULOCYTES NFR BLD AUTO: 1 % (ref 0–0.5)
INR PPP: 1 (ref 0.8–1.2)
LACTATE SERPL-SCNC: 1.2 MMOL/L (ref 0.5–2.2)
LACTATE SERPL-SCNC: 1.3 MMOL/L (ref 0.5–2.2)
LYMPHOCYTES # BLD AUTO: 1.2 K/UL (ref 1–4.8)
LYMPHOCYTES NFR BLD: 9.6 % (ref 18–48)
MCH RBC QN AUTO: 28.6 PG (ref 27–31)
MCHC RBC AUTO-ENTMCNC: 33.2 G/DL (ref 32–36)
MCV RBC AUTO: 86 FL (ref 82–98)
MONOCYTES # BLD AUTO: 1.5 K/UL (ref 0.3–1)
MONOCYTES NFR BLD: 11.3 % (ref 4–15)
NEUTROPHILS # BLD AUTO: 10 K/UL (ref 1.8–7.7)
NEUTROPHILS NFR BLD: 77.7 % (ref 38–73)
NRBC BLD-RTO: 0 /100 WBC
PLATELET # BLD AUTO: 284 K/UL (ref 150–350)
PMV BLD AUTO: 9.7 FL (ref 9.2–12.9)
POCT GLUCOSE: 260 MG/DL (ref 70–110)
POCT GLUCOSE: 311 MG/DL (ref 70–110)
POCT GLUCOSE: 317 MG/DL (ref 70–110)
POTASSIUM SERPL-SCNC: 4.6 MMOL/L (ref 3.5–5.1)
PROCALCITONIN SERPL IA-MCNC: 0.17 NG/ML
PROT SERPL-MCNC: 7.5 G/DL (ref 6–8.4)
PROTHROMBIN TIME: 10.8 SEC (ref 9–12.5)
RBC # BLD AUTO: 4.75 M/UL (ref 4.6–6.2)
SARS-COV-2 RDRP RESP QL NAA+PROBE: NEGATIVE
SODIUM SERPL-SCNC: 131 MMOL/L (ref 136–145)
WBC # BLD AUTO: 12.93 K/UL (ref 3.9–12.7)

## 2020-07-03 PROCEDURE — 63600175 PHARM REV CODE 636 W HCPCS: Performed by: STUDENT IN AN ORGANIZED HEALTH CARE EDUCATION/TRAINING PROGRAM

## 2020-07-03 PROCEDURE — 87040 BLOOD CULTURE FOR BACTERIA: CPT | Mod: 59

## 2020-07-03 PROCEDURE — 96374 THER/PROPH/DIAG INJ IV PUSH: CPT

## 2020-07-03 PROCEDURE — 87070 CULTURE OTHR SPECIMN AEROBIC: CPT

## 2020-07-03 PROCEDURE — 80053 COMPREHEN METABOLIC PANEL: CPT

## 2020-07-03 PROCEDURE — U0002 COVID-19 LAB TEST NON-CDC: HCPCS

## 2020-07-03 PROCEDURE — 84145 PROCALCITONIN (PCT): CPT

## 2020-07-03 PROCEDURE — 83605 ASSAY OF LACTIC ACID: CPT

## 2020-07-03 PROCEDURE — 25000003 PHARM REV CODE 250: Performed by: PHYSICIAN ASSISTANT

## 2020-07-03 PROCEDURE — 99285 EMERGENCY DEPT VISIT HI MDM: CPT | Mod: ,,, | Performed by: PHYSICIAN ASSISTANT

## 2020-07-03 PROCEDURE — 99285 PR EMERGENCY DEPT VISIT,LEVEL V: ICD-10-PCS | Mod: ,,, | Performed by: PHYSICIAN ASSISTANT

## 2020-07-03 PROCEDURE — 11000001 HC ACUTE MED/SURG PRIVATE ROOM

## 2020-07-03 PROCEDURE — 36415 COLL VENOUS BLD VENIPUNCTURE: CPT

## 2020-07-03 PROCEDURE — 25000003 PHARM REV CODE 250: Performed by: STUDENT IN AN ORGANIZED HEALTH CARE EDUCATION/TRAINING PROGRAM

## 2020-07-03 PROCEDURE — 63600175 PHARM REV CODE 636 W HCPCS: Performed by: PHYSICIAN ASSISTANT

## 2020-07-03 PROCEDURE — C9399 UNCLASSIFIED DRUGS OR BIOLOG: HCPCS | Performed by: STUDENT IN AN ORGANIZED HEALTH CARE EDUCATION/TRAINING PROGRAM

## 2020-07-03 PROCEDURE — 85025 COMPLETE CBC W/AUTO DIFF WBC: CPT

## 2020-07-03 PROCEDURE — 85652 RBC SED RATE AUTOMATED: CPT

## 2020-07-03 PROCEDURE — 83036 HEMOGLOBIN GLYCOSYLATED A1C: CPT

## 2020-07-03 PROCEDURE — 99285 EMERGENCY DEPT VISIT HI MDM: CPT | Mod: 25

## 2020-07-03 PROCEDURE — 99024 PR POST-OP FOLLOW-UP VISIT: ICD-10-PCS | Mod: ,,, | Performed by: PODIATRIST

## 2020-07-03 PROCEDURE — 85730 THROMBOPLASTIN TIME PARTIAL: CPT

## 2020-07-03 PROCEDURE — 85610 PROTHROMBIN TIME: CPT

## 2020-07-03 PROCEDURE — 83605 ASSAY OF LACTIC ACID: CPT | Mod: 91

## 2020-07-03 PROCEDURE — 99024 POSTOP FOLLOW-UP VISIT: CPT | Mod: ,,, | Performed by: PODIATRIST

## 2020-07-03 RX ORDER — ASPIRIN 81 MG/1
81 TABLET ORAL DAILY
Status: DISCONTINUED | OUTPATIENT
Start: 2020-07-04 | End: 2020-07-06 | Stop reason: HOSPADM

## 2020-07-03 RX ORDER — LISINOPRIL 20 MG/1
40 TABLET ORAL DAILY
Status: DISCONTINUED | OUTPATIENT
Start: 2020-07-04 | End: 2020-07-06 | Stop reason: HOSPADM

## 2020-07-03 RX ORDER — GLUCAGON 1 MG
1 KIT INJECTION
Status: DISCONTINUED | OUTPATIENT
Start: 2020-07-03 | End: 2020-07-05 | Stop reason: SDUPTHER

## 2020-07-03 RX ORDER — METFORMIN HYDROCHLORIDE 500 MG/1
1000 TABLET ORAL 2 TIMES DAILY WITH MEALS
Status: CANCELLED | OUTPATIENT
Start: 2020-07-03

## 2020-07-03 RX ORDER — ENOXAPARIN SODIUM 100 MG/ML
40 INJECTION SUBCUTANEOUS EVERY 24 HOURS
Status: DISCONTINUED | OUTPATIENT
Start: 2020-07-03 | End: 2020-07-06 | Stop reason: HOSPADM

## 2020-07-03 RX ORDER — ACETAMINOPHEN 325 MG/1
650 TABLET ORAL EVERY 8 HOURS PRN
Status: DISCONTINUED | OUTPATIENT
Start: 2020-07-03 | End: 2020-07-06 | Stop reason: HOSPADM

## 2020-07-03 RX ORDER — PROMETHAZINE HYDROCHLORIDE 25 MG/1
25 TABLET ORAL EVERY 6 HOURS PRN
Status: DISCONTINUED | OUTPATIENT
Start: 2020-07-03 | End: 2020-07-06 | Stop reason: HOSPADM

## 2020-07-03 RX ORDER — INSULIN ASPART 100 [IU]/ML
0-5 INJECTION, SOLUTION INTRAVENOUS; SUBCUTANEOUS
Status: DISCONTINUED | OUTPATIENT
Start: 2020-07-03 | End: 2020-07-04

## 2020-07-03 RX ORDER — ATORVASTATIN CALCIUM 20 MG/1
80 TABLET, FILM COATED ORAL DAILY
Status: DISCONTINUED | OUTPATIENT
Start: 2020-07-04 | End: 2020-07-06 | Stop reason: HOSPADM

## 2020-07-03 RX ORDER — ONDANSETRON 2 MG/ML
4 INJECTION INTRAMUSCULAR; INTRAVENOUS EVERY 8 HOURS PRN
Status: DISCONTINUED | OUTPATIENT
Start: 2020-07-03 | End: 2020-07-06 | Stop reason: HOSPADM

## 2020-07-03 RX ORDER — ONDANSETRON 8 MG/1
8 TABLET, ORALLY DISINTEGRATING ORAL EVERY 8 HOURS PRN
Status: DISCONTINUED | OUTPATIENT
Start: 2020-07-03 | End: 2020-07-06 | Stop reason: HOSPADM

## 2020-07-03 RX ORDER — IPRATROPIUM BROMIDE AND ALBUTEROL SULFATE 2.5; .5 MG/3ML; MG/3ML
3 SOLUTION RESPIRATORY (INHALATION) EVERY 4 HOURS PRN
Status: DISCONTINUED | OUTPATIENT
Start: 2020-07-03 | End: 2020-07-06 | Stop reason: HOSPADM

## 2020-07-03 RX ORDER — CARVEDILOL 12.5 MG/1
12.5 TABLET ORAL 2 TIMES DAILY
Status: DISCONTINUED | OUTPATIENT
Start: 2020-07-03 | End: 2020-07-06 | Stop reason: HOSPADM

## 2020-07-03 RX ORDER — SODIUM CHLORIDE 0.9 % (FLUSH) 0.9 %
10 SYRINGE (ML) INJECTION
Status: CANCELLED | OUTPATIENT
Start: 2020-07-03

## 2020-07-03 RX ORDER — ACETAMINOPHEN 325 MG/1
650 TABLET ORAL EVERY 4 HOURS PRN
Status: DISCONTINUED | OUTPATIENT
Start: 2020-07-03 | End: 2020-07-03

## 2020-07-03 RX ORDER — IBUPROFEN 200 MG
16 TABLET ORAL
Status: DISCONTINUED | OUTPATIENT
Start: 2020-07-03 | End: 2020-07-05 | Stop reason: SDUPTHER

## 2020-07-03 RX ORDER — IBUPROFEN 200 MG
24 TABLET ORAL
Status: DISCONTINUED | OUTPATIENT
Start: 2020-07-03 | End: 2020-07-05 | Stop reason: SDUPTHER

## 2020-07-03 RX ORDER — SODIUM CHLORIDE 0.9 % (FLUSH) 0.9 %
10 SYRINGE (ML) INJECTION
Status: DISCONTINUED | OUTPATIENT
Start: 2020-07-03 | End: 2020-07-06 | Stop reason: HOSPADM

## 2020-07-03 RX ORDER — SODIUM CHLORIDE 0.9 % (FLUSH) 0.9 %
10 SYRINGE (ML) INJECTION
Status: DISCONTINUED | OUTPATIENT
Start: 2020-07-03 | End: 2020-07-03

## 2020-07-03 RX ORDER — ENOXAPARIN SODIUM 100 MG/ML
40 INJECTION SUBCUTANEOUS EVERY 24 HOURS
Status: DISCONTINUED | OUTPATIENT
Start: 2020-07-03 | End: 2020-07-03

## 2020-07-03 RX ORDER — DIPHENHYDRAMINE HCL 25 MG
25 CAPSULE ORAL ONCE AS NEEDED
Status: COMPLETED | OUTPATIENT
Start: 2020-07-03 | End: 2020-07-03

## 2020-07-03 RX ADMIN — VANCOMYCIN HYDROCHLORIDE 2500 MG: 1.5 INJECTION, POWDER, LYOPHILIZED, FOR SOLUTION INTRAVENOUS at 05:07

## 2020-07-03 RX ADMIN — DIPHENHYDRAMINE HYDROCHLORIDE 25 MG: 25 CAPSULE ORAL at 05:07

## 2020-07-03 RX ADMIN — INSULIN DETEMIR 40 UNITS: 100 INJECTION, SOLUTION SUBCUTANEOUS at 10:07

## 2020-07-03 RX ADMIN — INSULIN ASPART 2 UNITS: 100 INJECTION, SOLUTION INTRAVENOUS; SUBCUTANEOUS at 10:07

## 2020-07-03 RX ADMIN — CARVEDILOL 12.5 MG: 12.5 TABLET, FILM COATED ORAL at 10:07

## 2020-07-03 RX ADMIN — ENOXAPARIN SODIUM 40 MG: 40 INJECTION SUBCUTANEOUS at 10:07

## 2020-07-03 NOTE — ED NOTES
Waiting for IV vanc to arrive from in- pt pharmacy. Left great toe has foul odor  . Sloughing tissue and redness to foot and toes  noted . Waiting for podiatry consult. Pt in recliner w/ foot section elevtated

## 2020-07-03 NOTE — ED TRIAGE NOTES
Pt was seen by podiatry for left great toe wound.  Pt states that he was wearing new boots that caused irritation.  Pt was given meds to put on wound, but continues to worsen.  Pt is diabetic.

## 2020-07-03 NOTE — ED NOTES
LOC: The patient is awake, alert and aware of environment with an appropriate affect, the patient is oriented x 3 and speaking appropriately.  APPEARANCE: Patient resting comfortably and in no acute distress, patient is clean and well groomed, patient's clothing is properly fastened.  SKIN: The skin is warm and dry, color consistent with ethnicity, patient has normal skin turgor and moist mucus membranes, skin intact, no breakdown or bruising noted.  MUSCULOSKELETAL: Patient moving all extremities spontaneously, no obvious swelling or deformities noted.  + wound to left great toe with cellulitis to left foot extending to left lower leg  RESPIRATORY: Airway is open and patent, respirations are spontaneous, patient has a normal effort and rate, no accessory muscle use noted.

## 2020-07-03 NOTE — HPI
55-year-old male with DM type 2, HLD, HTN presents to the ED for evaluation of toe infection. Patient reports increased swelling in left lower extremity and redness spreading further up left foot and lower leg. Patient had a bone biopsy obtained by Podiatry on 7/4/20. He was started on Cipro at that time, currently on IV Vancomycin drip. Culture is growing Enterococcus faecalis. Sensitivities are pending. Patient denies fever, chills, night sweats, dyspnea, changes in bowel or bladder habits. Reports feeling fatigued and nauseous recently. Also reports poor appetite for the past week with episodes of vomiting he ascribes to antibiotic regimen.

## 2020-07-03 NOTE — PROVIDER PROGRESS NOTES - EMERGENCY DEPT.
Encounter Date: 7/3/2020    ED Physician Progress Notes           Pending remainder of w/u, admission to IM  Podiatry call back    ED Physician Hand-off Note:    ED Course: I assumed care of patient from off-going ED physician team. Briefly, Patient presents for severe diabetic infection of L hallux, referred to ED from podiatry.    At the time of signout plan was pending remainder of lab work and admission to IM, inpatient recs from podiatry.    Medications given in the ED:    Medications   aspirin EC tablet 81 mg (81 mg Oral Given 7/4/20 0915)   atorvastatin tablet 80 mg (80 mg Oral Given 7/4/20 0915)   sodium chloride 0.9% flush 10 mL (has no administration in time range)   enoxaparin injection 40 mg (40 mg Subcutaneous Given 7/4/20 1702)   ondansetron disintegrating tablet 8 mg (has no administration in time range)   promethazine tablet 25 mg (has no administration in time range)   acetaminophen tablet 650 mg (has no administration in time range)   vancomycin - pharmacy to dose (has no administration in time range)   glucose chewable tablet 16 g (has no administration in time range)   glucose chewable tablet 24 g (has no administration in time range)   dextrose 50% injection 12.5 g (has no administration in time range)   dextrose 50% injection 25 g (has no administration in time range)   glucagon (human recombinant) injection 1 mg (has no administration in time range)   promethazine tablet 25 mg (has no administration in time range)   ondansetron injection 4 mg (has no administration in time range)   albuterol-ipratropium 2.5 mg-0.5 mg/3 mL nebulizer solution 3 mL (has no administration in time range)   carvediloL tablet 12.5 mg (12.5 mg Oral Given 7/4/20 2205)   Lactobacillus rhamnosus GG capsule 1 capsule (1 capsule Oral Given 7/4/20 0915)   lisinopriL tablet 40 mg (40 mg Oral Given 7/4/20 0916)   vancomycin 1.5 g in dextrose 5 % 250 mL IVPB (ready to mix) (1,500 mg Intravenous Trough Due 30 minutes Before  Dose 7/5/20 0530)   dextrose 50% injection 12.5 g (has no administration in time range)   glucagon (human recombinant) injection 1 mg (has no administration in time range)   insulin aspart U-100 pen 1-10 Units (2 Units Subcutaneous Given 7/4/20 1702)   HYDROcodone-acetaminophen 5-325 mg per tablet 1 tablet (1 tablet Oral Given 7/4/20 1254)   sodium chloride 0.9% flush 10 mL (has no administration in time range)   fentaNYL injection 25 mcg (has no administration in time range)   ondansetron injection 4 mg (has no administration in time range)   insulin detemir U-100 pen 45 Units (has no administration in time range)   insulin aspart U-100 pen 3 Units (3 Units Subcutaneous Given 7/4/20 1702)   vancomycin (VANCOCIN) 2,500 mg in dextrose 5 % 500 mL IVPB (2,500 mg Intravenous New Bag 7/3/20 1710)   diphenhydrAMINE capsule 25 mg (25 mg Oral Given 7/3/20 1710)   lidocaine HCL 10 mg/ml (1%) 10 mg/mL (1 %) injection (has no administration in time range)   bupivacaine (PF) 0.5% (5 mg/mL) (MARCAINE) 0.5 % (5 mg/mL) injection (has no administration in time range)   midazolam (VERSED) 1 mg/mL injection (has no administration in time range)   ketamine in 0.9 % sod chloride 50 mg/5 mL (10 mg/mL) injection (has no administration in time range)   lidocaine (PF) 20 mg/mL (2%) 20 mg/mL (2 %) injection (has no administration in time range)   glycopyrrolate (ROBINUL) 0.2 mg/mL injection (has no administration in time range)   phenylephrine HCl in 0.9% NaCl 1 mg/10 mL (100 mcg/mL) syringe (has no administration in time range)   glycopyrrolate (ROBINUL) 0.2 mg/mL injection (has no administration in time range)   ePHEDrine sulfate 50 mg/mL (has no administration in time range)       Disposition: admitted    Patient comfortable with admission. Patient counseled regarding exam, results, diagnosis, treatment, and plan.    Impression: Lactate WNL, IV abx given in ED. Podiatry consulted and will see patient this evening in ED prior to  admission. Admitted to IM for continued management of severe diabetic foot infection. Patient agreed to plan of care and voiced understanding.    Madiha Sidhu PA-C  07/04/2020

## 2020-07-03 NOTE — ED PROVIDER NOTES
"Encounter Date: 7/3/2020       History     Chief Complaint   Patient presents with    Toe Pain     Patient sees podiatry and has redness and swelling to the left leg.       55-year-old male with DM type 2, HLD, HTN presents to the ED for evaluation of foot wound.  Patient reports worsening swelling and spreading redness to the left foot with an open wound to the great toe.  Patient had a bone biopsy obtained by Podiatry 4 days ago.  He was started on Cipro at that time.  Culture is growing Enterococcus faecalis.  Sensitivities are pending.  Patient denies fever or chills, but reports feeling worn out".  Also reports poor appetite for the past week.        Review of patient's allergies indicates:   Allergen Reactions    Penicillins Other (See Comments)     PCN allergy as a child - was told he went into a coma. Tolerates Cefazolin without adverse reactions    Shellfish containing products      Other reaction(s): Unknown    Vancomycin Itching    Bactrim  [sulfamethoxazole-trimethoprim] Rash     Past Medical History:   Diagnosis Date    Allergy     CAD (coronary artery disease), native coronary artery 6/25/2013    Cancer     Diabetes mellitus     Diabetes mellitus, type 2     Disorder of kidney and ureter     Heart attack 04/2012    Hx of colon cancer, stage I     Hyperlipidemia     Hypertension     Muscular pain     post-op after colonoscopy    MICHAELA (obstructive sleep apnea)      Past Surgical History:   Procedure Laterality Date    COLONOSCOPY N/A 2/17/2017    Procedure: COLONOSCOPY;  Surgeon: Simon Gomez MD;  Location: [facility] (4TH FLR);  Service: Endoscopy;  Laterality: N/A;    CORONARY ANGIOPLASTY      INCISION AND DRAINAGE FOOT Right 6/5/2018    Procedure: INCISION AND DRAINAGE, FOOT;  Surgeon: Bridget Santana DPM;  [address]  Service: Podiatry;  Laterality: Right;  Request mini C arm in room. request wound vac with medium black sponge    INCISION AND DRAINAGE FOOT " Right 6/7/2018    Procedure: INCISION AND DRAINAGE, FOOT;  Surgeon: Emelia Brock DPM;  Location: [facility] OR [facility] FLR;  Service: Podiatry;  Laterality: Right;    INCISION AND DRAINAGE OF ABSCESS Right 6/2/2018    Procedure: INCISION AND DRAINAGE, ABSCESS;  Surgeon: Bridget Santana DPM;  Location: [facility] OR 2ND FLR;  Service: General;  Laterality: Right;    REMOVAL OF FOREIGN BODY FROM FOOT Right 6/7/2018    Procedure: REMOVAL, FOREIGN BODY, FOOT;  Surgeon: Emelia Brock DPM;  [facility];  Service: Podiatry;  Laterality: Right;    TONSILLECTOMY       Family History   Problem Relation Age of Onset    Heart disease Mother     No Known Problems Father     Heart disease Brother     Anesthesia problems Neg Hx      Social History     Tobacco Use    Smoking status: Never Smoker    Smokeless tobacco: Never Used   Substance Use Topics    Alcohol use: Yes     Comment: rare x1 beer-     Drug use: No     Review of Systems   Constitutional: Positive for appetite change and fatigue. Negative for fever.   HENT: Negative for sore throat.    Respiratory: Negative for shortness of breath.    Cardiovascular: Negative for chest pain.   Gastrointestinal: Negative for nausea.   Genitourinary: Negative for dysuria.   Musculoskeletal: Negative for back pain.   Skin: Positive for wound. Negative for rash.   Neurological: Negative for weakness.   Hematological: Does not bruise/bleed easily.       Physical Exam     Initial Vitals [07/03/20 1415]   BP Pulse Resp Temp SpO2   136/88 95 16 98 °F (36.7 °C) 98 %      MAP       --         Physical Exam    Nursing note and vitals reviewed.  Constitutional: He appears well-developed and well-nourished.  Non-toxic appearance. He does not appear ill. No distress.   HENT:   Head: Normocephalic and atraumatic.   Neck: Normal range of motion. Neck supple.   Cardiovascular: Normal rate and regular rhythm. Exam reveals no gallop, no distant heart sounds and no friction rub.    No murmur  heard.  Pulmonary/Chest: Effort normal and breath sounds normal. No accessory muscle usage. No tachypnea. No respiratory distress. He has no decreased breath sounds. He has no wheezes. He has no rhonchi. He has no rales.   Abdominal: He exhibits no distension.   Neurological: He is alert.   Skin: No rash noted.                 ED Course   Procedures  Labs Reviewed   CBC W/ AUTO DIFFERENTIAL - Abnormal; Notable for the following components:       Result Value    WBC 12.93 (*)     Hemoglobin 13.6 (*)     Immature Granulocytes 1.0 (*)     Gran # (ANC) 10.0 (*)     Immature Grans (Abs) 0.13 (*)     Mono # 1.5 (*)     Gran% 77.7 (*)     Lymph% 9.6 (*)     All other components within normal limits   COMPREHENSIVE METABOLIC PANEL - Abnormal; Notable for the following components:    Sodium 131 (*)     Glucose 407 (*)     Albumin 2.7 (*)     All other components within normal limits   CULTURE, BLOOD   CULTURE, BLOOD   CULTURE, AEROBIC  (SPECIFY SOURCE)   SARS-COV-2 RNA AMPLIFICATION, QUAL   LACTIC ACID, PLASMA          Imaging Results          X-Ray Toe 2 or More Views Left (In process)                  Medical Decision Making:   History:   Old Medical Records: I decided to obtain old medical records.  Differential Diagnosis:   My differential diagnosis includes but is not limited to:  Gangrene, osteomyelitis, cellulitis, bacteremia  Clinical Tests:   Lab Tests: Ordered  Radiological Study: Ordered  Other:   I have discussed this case with another health care provider.       <> Summary of the Discussion:        APC / Resident Notes:   55-year-old male presents to the ED with worsening foot wound despite outpatient antibiotics.  Bone biopsy from 4 days ago grew enterococcus faecalis with sensitivities pending.  Vitals within normal limits. Patient is nontoxic appearing.  See photo documentation of patient's left foot wound above.  Concern for wet gangrene/osteomyelitis.    Will obtain blood work, foot x-ray.  Will give a  dose of vancomycin.    Patient will be signed out to feel fellow PA pending workup results and proper disposition (Obs vs inpatient).    I have reviewed the patient's records and discussed this case with my supervising physician. Please be advised that this text was dictated with [employer]end[employer]od software and may contain dictation errors.                                       Clinical Impression:       ICD-10-CM ICD-9-CM   1. Osteomyelitis of great toe of left foot  M86.9 730.27                                DARVIN June-C  07/03/20 [id number]

## 2020-07-04 ENCOUNTER — ANESTHESIA (OUTPATIENT)
Dept: SURGERY | Facility: HOSPITAL | Age: 55
DRG: 617 | End: 2020-07-04
Payer: COMMERCIAL

## 2020-07-04 PROBLEM — T81.89XA DELAYED SURGICAL WOUND HEALING: Status: RESOLVED | Noted: 2018-06-15 | Resolved: 2020-07-04

## 2020-07-04 PROBLEM — Z79.4 TYPE 2 DIABETES MELLITUS WITH HYPERGLYCEMIA, WITH LONG-TERM CURRENT USE OF INSULIN: Status: RESOLVED | Noted: 2017-07-05 | Resolved: 2020-07-04

## 2020-07-04 PROBLEM — E11.65 TYPE 2 DIABETES MELLITUS WITH HYPERGLYCEMIA, WITH LONG-TERM CURRENT USE OF INSULIN: Status: RESOLVED | Noted: 2017-07-05 | Resolved: 2020-07-04

## 2020-07-04 PROBLEM — E11.9 DIABETES MELLITUS: Status: RESOLVED | Noted: 2018-06-15 | Resolved: 2020-07-04

## 2020-07-04 PROBLEM — L97.419 DIABETIC ULCER OF RIGHT MIDFOOT: Status: RESOLVED | Noted: 2018-06-01 | Resolved: 2020-07-04

## 2020-07-04 PROBLEM — E11.621 DIABETIC ULCER OF RIGHT MIDFOOT: Status: RESOLVED | Noted: 2018-06-01 | Resolved: 2020-07-04

## 2020-07-04 PROBLEM — Z75.8 DISCHARGE PLANNING ISSUES: Status: ACTIVE | Noted: 2020-07-04

## 2020-07-04 PROBLEM — S90.851A FOREIGN BODY IN RIGHT FOOT: Status: RESOLVED | Noted: 2018-06-08 | Resolved: 2020-07-04

## 2020-07-04 PROBLEM — L08.9 RIGHT FOOT INFECTION: Status: RESOLVED | Noted: 2018-06-03 | Resolved: 2020-07-04

## 2020-07-04 PROBLEM — Z02.9 DISCHARGE PLANNING ISSUES: Status: ACTIVE | Noted: 2020-07-04

## 2020-07-04 LAB
ALBUMIN SERPL BCP-MCNC: 2.3 G/DL (ref 3.5–5.2)
ALP SERPL-CCNC: 108 U/L (ref 55–135)
ALT SERPL W/O P-5'-P-CCNC: 16 U/L (ref 10–44)
ANION GAP SERPL CALC-SCNC: 10 MMOL/L (ref 8–16)
AST SERPL-CCNC: 15 U/L (ref 10–40)
BACTERIA SPEC AEROBE CULT: ABNORMAL
BASOPHILS # BLD AUTO: 0.05 K/UL (ref 0–0.2)
BASOPHILS NFR BLD: 0.4 % (ref 0–1.9)
BILIRUB SERPL-MCNC: 0.5 MG/DL (ref 0.1–1)
BUN SERPL-MCNC: 17 MG/DL (ref 6–20)
CALCIUM SERPL-MCNC: 8.3 MG/DL (ref 8.7–10.5)
CHLORIDE SERPL-SCNC: 98 MMOL/L (ref 95–110)
CO2 SERPL-SCNC: 23 MMOL/L (ref 23–29)
CREAT SERPL-MCNC: 1 MG/DL (ref 0.5–1.4)
DIFFERENTIAL METHOD: ABNORMAL
EOSINOPHIL # BLD AUTO: 0.1 K/UL (ref 0–0.5)
EOSINOPHIL NFR BLD: 1 % (ref 0–8)
ERYTHROCYTE [DISTWIDTH] IN BLOOD BY AUTOMATED COUNT: 12.4 % (ref 11.5–14.5)
ERYTHROCYTE [SEDIMENTATION RATE] IN BLOOD BY WESTERGREN METHOD: 96 MM/HR (ref 0–23)
EST. GFR  (AFRICAN AMERICAN): >60 ML/MIN/1.73 M^2
EST. GFR  (NON AFRICAN AMERICAN): >60 ML/MIN/1.73 M^2
GLUCOSE SERPL-MCNC: 346 MG/DL (ref 70–110)
GRAM STN SPEC: NORMAL
GRAM STN SPEC: NORMAL
HCT VFR BLD AUTO: 35.5 % (ref 40–54)
HGB BLD-MCNC: 11.9 G/DL (ref 14–18)
IMM GRANULOCYTES # BLD AUTO: 0.11 K/UL (ref 0–0.04)
IMM GRANULOCYTES NFR BLD AUTO: 1 % (ref 0–0.5)
LYMPHOCYTES # BLD AUTO: 1.8 K/UL (ref 1–4.8)
LYMPHOCYTES NFR BLD: 15.8 % (ref 18–48)
MAGNESIUM SERPL-MCNC: 1.7 MG/DL (ref 1.6–2.6)
MCH RBC QN AUTO: 28.8 PG (ref 27–31)
MCHC RBC AUTO-ENTMCNC: 33.5 G/DL (ref 32–36)
MCV RBC AUTO: 86 FL (ref 82–98)
MONOCYTES # BLD AUTO: 1.3 K/UL (ref 0.3–1)
MONOCYTES NFR BLD: 11.3 % (ref 4–15)
NEUTROPHILS # BLD AUTO: 7.9 K/UL (ref 1.8–7.7)
NEUTROPHILS NFR BLD: 70.5 % (ref 38–73)
NRBC BLD-RTO: 0 /100 WBC
PHOSPHATE SERPL-MCNC: 3.7 MG/DL (ref 2.7–4.5)
PLATELET # BLD AUTO: 265 K/UL (ref 150–350)
PMV BLD AUTO: 9.6 FL (ref 9.2–12.9)
POCT GLUCOSE: 190 MG/DL (ref 70–110)
POCT GLUCOSE: 230 MG/DL (ref 70–110)
POCT GLUCOSE: 274 MG/DL (ref 70–110)
POCT GLUCOSE: 293 MG/DL (ref 70–110)
POCT GLUCOSE: 308 MG/DL (ref 70–110)
POTASSIUM SERPL-SCNC: 4.3 MMOL/L (ref 3.5–5.1)
PROT SERPL-MCNC: 6.4 G/DL (ref 6–8.4)
RBC # BLD AUTO: 4.13 M/UL (ref 4.6–6.2)
SODIUM SERPL-SCNC: 131 MMOL/L (ref 136–145)
WBC # BLD AUTO: 11.23 K/UL (ref 3.9–12.7)

## 2020-07-04 PROCEDURE — 25000003 PHARM REV CODE 250: Performed by: STUDENT IN AN ORGANIZED HEALTH CARE EDUCATION/TRAINING PROGRAM

## 2020-07-04 PROCEDURE — 85025 COMPLETE CBC W/AUTO DIFF WBC: CPT

## 2020-07-04 PROCEDURE — 82962 GLUCOSE BLOOD TEST: CPT | Performed by: PODIATRIST

## 2020-07-04 PROCEDURE — 63600175 PHARM REV CODE 636 W HCPCS: Performed by: NURSE ANESTHETIST, CERTIFIED REGISTERED

## 2020-07-04 PROCEDURE — 88311 DECALCIFY TISSUE: CPT | Performed by: PATHOLOGY

## 2020-07-04 PROCEDURE — 37000009 HC ANESTHESIA EA ADD 15 MINS: Performed by: PODIATRIST

## 2020-07-04 PROCEDURE — 63600175 PHARM REV CODE 636 W HCPCS: Performed by: HOSPITALIST

## 2020-07-04 PROCEDURE — 71000015 HC POSTOP RECOV 1ST HR: Performed by: PODIATRIST

## 2020-07-04 PROCEDURE — 85652 RBC SED RATE AUTOMATED: CPT

## 2020-07-04 PROCEDURE — 37000008 HC ANESTHESIA 1ST 15 MINUTES: Performed by: PODIATRIST

## 2020-07-04 PROCEDURE — 87076 CULTURE ANAEROBE IDENT EACH: CPT | Mod: 59

## 2020-07-04 PROCEDURE — 25000003 PHARM REV CODE 250: Performed by: NURSE ANESTHETIST, CERTIFIED REGISTERED

## 2020-07-04 PROCEDURE — D9220A PRA ANESTHESIA: Mod: ,,, | Performed by: ANESTHESIOLOGY

## 2020-07-04 PROCEDURE — 87070 CULTURE OTHR SPECIMN AEROBIC: CPT

## 2020-07-04 PROCEDURE — 87205 SMEAR GRAM STAIN: CPT

## 2020-07-04 PROCEDURE — 25000003 PHARM REV CODE 250: Performed by: PODIATRIST

## 2020-07-04 PROCEDURE — 84100 ASSAY OF PHOSPHORUS: CPT

## 2020-07-04 PROCEDURE — 88305 TISSUE EXAM BY PATHOLOGIST: CPT | Performed by: PATHOLOGY

## 2020-07-04 PROCEDURE — 27201423 OPTIME MED/SURG SUP & DEVICES STERILE SUPPLY: Performed by: PODIATRIST

## 2020-07-04 PROCEDURE — 63600175 PHARM REV CODE 636 W HCPCS: Performed by: STUDENT IN AN ORGANIZED HEALTH CARE EDUCATION/TRAINING PROGRAM

## 2020-07-04 PROCEDURE — 36000706: Performed by: PODIATRIST

## 2020-07-04 PROCEDURE — D9220A PRA ANESTHESIA: ICD-10-PCS | Mod: ,,, | Performed by: ANESTHESIOLOGY

## 2020-07-04 PROCEDURE — 28820 AMPUTATION OF TOE: CPT | Mod: TA,,, | Performed by: PODIATRIST

## 2020-07-04 PROCEDURE — 83735 ASSAY OF MAGNESIUM: CPT

## 2020-07-04 PROCEDURE — 97802 MEDICAL NUTRITION INDIV IN: CPT

## 2020-07-04 PROCEDURE — 88305 TISSUE EXAM BY PATHOLOGIST: ICD-10-PCS | Mod: 26,,, | Performed by: PATHOLOGY

## 2020-07-04 PROCEDURE — 28820 PR AMPUTATION TOE,MT-P JT: ICD-10-PCS | Mod: TA,,, | Performed by: PODIATRIST

## 2020-07-04 PROCEDURE — 11000001 HC ACUTE MED/SURG PRIVATE ROOM

## 2020-07-04 PROCEDURE — 99223 1ST HOSP IP/OBS HIGH 75: CPT | Mod: ,,, | Performed by: HOSPITALIST

## 2020-07-04 PROCEDURE — 36000707: Performed by: PODIATRIST

## 2020-07-04 PROCEDURE — 87015 SPECIMEN INFECT AGNT CONCNTJ: CPT

## 2020-07-04 PROCEDURE — 88311 DECALCIFY TISSUE: CPT | Mod: 26,,, | Performed by: PATHOLOGY

## 2020-07-04 PROCEDURE — 99223 PR INITIAL HOSPITAL CARE,LEVL III: ICD-10-PCS | Mod: ,,, | Performed by: HOSPITALIST

## 2020-07-04 PROCEDURE — 87102 FUNGUS ISOLATION CULTURE: CPT

## 2020-07-04 PROCEDURE — 80053 COMPREHEN METABOLIC PANEL: CPT

## 2020-07-04 PROCEDURE — S0020 INJECTION, BUPIVICAINE HYDRO: HCPCS | Performed by: PODIATRIST

## 2020-07-04 PROCEDURE — 88305 TISSUE EXAM BY PATHOLOGIST: CPT | Mod: 26,,, | Performed by: PATHOLOGY

## 2020-07-04 PROCEDURE — 87116 MYCOBACTERIA CULTURE: CPT

## 2020-07-04 PROCEDURE — 87075 CULTR BACTERIA EXCEPT BLOOD: CPT

## 2020-07-04 PROCEDURE — 87206 SMEAR FLUORESCENT/ACID STAI: CPT

## 2020-07-04 PROCEDURE — 36415 COLL VENOUS BLD VENIPUNCTURE: CPT

## 2020-07-04 PROCEDURE — 71000033 HC RECOVERY, INTIAL HOUR: Performed by: PODIATRIST

## 2020-07-04 PROCEDURE — 88311 PR  DECALCIFY TISSUE: ICD-10-PCS | Mod: 26,,, | Performed by: PATHOLOGY

## 2020-07-04 RX ORDER — MIDAZOLAM HYDROCHLORIDE 1 MG/ML
INJECTION, SOLUTION INTRAMUSCULAR; INTRAVENOUS
Status: DISCONTINUED | OUTPATIENT
Start: 2020-07-04 | End: 2020-07-04

## 2020-07-04 RX ORDER — BUPIVACAINE HYDROCHLORIDE 5 MG/ML
INJECTION, SOLUTION EPIDURAL; INTRACAUDAL
Status: DISCONTINUED | OUTPATIENT
Start: 2020-07-04 | End: 2020-07-04 | Stop reason: HOSPADM

## 2020-07-04 RX ORDER — HYDROCODONE BITARTRATE AND ACETAMINOPHEN 5; 325 MG/1; MG/1
1 TABLET ORAL EVERY 4 HOURS PRN
Status: DISPENSED | OUTPATIENT
Start: 2020-07-04 | End: 2020-07-05

## 2020-07-04 RX ORDER — LIDOCAINE HYDROCHLORIDE 10 MG/ML
INJECTION, SOLUTION EPIDURAL; INFILTRATION; INTRACAUDAL; PERINEURAL
Status: DISCONTINUED | OUTPATIENT
Start: 2020-07-04 | End: 2020-07-04 | Stop reason: HOSPADM

## 2020-07-04 RX ORDER — GLYCOPYRROLATE 0.2 MG/ML
INJECTION INTRAMUSCULAR; INTRAVENOUS
Status: DISCONTINUED | OUTPATIENT
Start: 2020-07-04 | End: 2020-07-04

## 2020-07-04 RX ORDER — GLUCAGON 1 MG
1 KIT INJECTION
Status: DISCONTINUED | OUTPATIENT
Start: 2020-07-04 | End: 2020-07-05

## 2020-07-04 RX ORDER — LIDOCAINE HCL/PF 100 MG/5ML
SYRINGE (ML) INTRAVENOUS
Status: DISCONTINUED | OUTPATIENT
Start: 2020-07-04 | End: 2020-07-04

## 2020-07-04 RX ORDER — ONDANSETRON 2 MG/ML
4 INJECTION INTRAMUSCULAR; INTRAVENOUS ONCE
Status: DISCONTINUED | OUTPATIENT
Start: 2020-07-04 | End: 2020-07-06

## 2020-07-04 RX ORDER — INSULIN ASPART 100 [IU]/ML
3 INJECTION, SOLUTION INTRAVENOUS; SUBCUTANEOUS
Status: DISCONTINUED | OUTPATIENT
Start: 2020-07-04 | End: 2020-07-05

## 2020-07-04 RX ORDER — FENTANYL CITRATE 50 UG/ML
25 INJECTION, SOLUTION INTRAMUSCULAR; INTRAVENOUS EVERY 5 MIN PRN
Status: DISCONTINUED | OUTPATIENT
Start: 2020-07-04 | End: 2020-07-05

## 2020-07-04 RX ORDER — KETAMINE HCL IN 0.9 % NACL 50 MG/5 ML
SYRINGE (ML) INTRAVENOUS
Status: DISCONTINUED | OUTPATIENT
Start: 2020-07-04 | End: 2020-07-04

## 2020-07-04 RX ORDER — INSULIN ASPART 100 [IU]/ML
1-10 INJECTION, SOLUTION INTRAVENOUS; SUBCUTANEOUS EVERY 6 HOURS PRN
Status: DISCONTINUED | OUTPATIENT
Start: 2020-07-04 | End: 2020-07-05

## 2020-07-04 RX ORDER — PHENYLEPHRINE HYDROCHLORIDE 10 MG/ML
INJECTION INTRAVENOUS
Status: DISCONTINUED | OUTPATIENT
Start: 2020-07-04 | End: 2020-07-04

## 2020-07-04 RX ORDER — PROPOFOL 10 MG/ML
VIAL (ML) INTRAVENOUS CONTINUOUS PRN
Status: DISCONTINUED | OUTPATIENT
Start: 2020-07-04 | End: 2020-07-04

## 2020-07-04 RX ORDER — EPHEDRINE SULFATE 50 MG/ML
INJECTION, SOLUTION INTRAVENOUS
Status: DISCONTINUED | OUTPATIENT
Start: 2020-07-04 | End: 2020-07-04

## 2020-07-04 RX ORDER — PROPOFOL 10 MG/ML
VIAL (ML) INTRAVENOUS
Status: DISCONTINUED | OUTPATIENT
Start: 2020-07-04 | End: 2020-07-04

## 2020-07-04 RX ORDER — SODIUM CHLORIDE 0.9 % (FLUSH) 0.9 %
10 SYRINGE (ML) INJECTION
Status: DISCONTINUED | OUTPATIENT
Start: 2020-07-04 | End: 2020-07-06 | Stop reason: HOSPADM

## 2020-07-04 RX ADMIN — LISINOPRIL 40 MG: 20 TABLET ORAL at 09:07

## 2020-07-04 RX ADMIN — HYDROCODONE BITARTRATE AND ACETAMINOPHEN 1 TABLET: 5; 325 TABLET ORAL at 12:07

## 2020-07-04 RX ADMIN — CARVEDILOL 12.5 MG: 12.5 TABLET, FILM COATED ORAL at 09:07

## 2020-07-04 RX ADMIN — GLYCOPYRROLATE 0.2 MG: 0.2 INJECTION, SOLUTION INTRAMUSCULAR; INTRAVENOUS at 11:07

## 2020-07-04 RX ADMIN — SODIUM CHLORIDE, SODIUM GLUCONATE, SODIUM ACETATE, POTASSIUM CHLORIDE, MAGNESIUM CHLORIDE, SODIUM PHOSPHATE, DIBASIC, AND POTASSIUM PHOSPHATE: .53; .5; .37; .037; .03; .012; .00082 INJECTION, SOLUTION INTRAVENOUS at 11:07

## 2020-07-04 RX ADMIN — Medication 1 CAPSULE: at 09:07

## 2020-07-04 RX ADMIN — EPHEDRINE SULFATE 10 MG: 50 INJECTION INTRAVENOUS at 11:07

## 2020-07-04 RX ADMIN — INSULIN ASPART 4 UNITS: 100 INJECTION, SOLUTION INTRAVENOUS; SUBCUTANEOUS at 10:07

## 2020-07-04 RX ADMIN — PHENYLEPHRINE HYDROCHLORIDE 100 MCG: 10 INJECTION INTRAVENOUS at 11:07

## 2020-07-04 RX ADMIN — ASPIRIN 81 MG: 81 TABLET, COATED ORAL at 09:07

## 2020-07-04 RX ADMIN — ATORVASTATIN CALCIUM 80 MG: 20 TABLET, FILM COATED ORAL at 09:07

## 2020-07-04 RX ADMIN — VANCOMYCIN HYDROCHLORIDE 1500 MG: 1.5 INJECTION, POWDER, LYOPHILIZED, FOR SOLUTION INTRAVENOUS at 06:07

## 2020-07-04 RX ADMIN — INSULIN ASPART 3 UNITS: 100 INJECTION, SOLUTION INTRAVENOUS; SUBCUTANEOUS at 05:07

## 2020-07-04 RX ADMIN — PROPOFOL 30 MG: 10 INJECTION, EMULSION INTRAVENOUS at 11:07

## 2020-07-04 RX ADMIN — MIDAZOLAM HYDROCHLORIDE 2 MG: 1 INJECTION, SOLUTION INTRAMUSCULAR; INTRAVENOUS at 11:07

## 2020-07-04 RX ADMIN — INSULIN ASPART 2 UNITS: 100 INJECTION, SOLUTION INTRAVENOUS; SUBCUTANEOUS at 05:07

## 2020-07-04 RX ADMIN — Medication 20 MG: at 11:07

## 2020-07-04 RX ADMIN — PROPOFOL 150 MCG/KG/MIN: 10 INJECTION, EMULSION INTRAVENOUS at 11:07

## 2020-07-04 RX ADMIN — INSULIN ASPART 6 UNITS: 100 INJECTION, SOLUTION INTRAVENOUS; SUBCUTANEOUS at 07:07

## 2020-07-04 RX ADMIN — INSULIN ASPART 4 UNITS: 100 INJECTION, SOLUTION INTRAVENOUS; SUBCUTANEOUS at 01:07

## 2020-07-04 RX ADMIN — LIDOCAINE HYDROCHLORIDE 50 MG: 20 INJECTION, SOLUTION INTRAVENOUS at 11:07

## 2020-07-04 RX ADMIN — Medication 10 MG: at 11:07

## 2020-07-04 RX ADMIN — CARVEDILOL 12.5 MG: 12.5 TABLET, FILM COATED ORAL at 10:07

## 2020-07-04 RX ADMIN — ENOXAPARIN SODIUM 40 MG: 40 INJECTION SUBCUTANEOUS at 05:07

## 2020-07-04 NOTE — PROGRESS NOTES
Pharmacokinetic Initial Assessment: IV Vancomycin    Assessment/Plan:    Initiate intravenous vancomycin with loading dose of 2500 mg once in the ER followed by a maintenance dose of vancomycin 1500mg IV every 12 hours  Desired empiric serum trough concentration is 15 to 20 mcg/mL  Draw vancomycin trough level 30 min prior to fourth dose on 7/5 at approximately 0530  Pharmacy will continue to follow and monitor vancomycin.      Please contact pharmacy at extension [phone] with any questions regarding this assessment.     Thank you for the consult,   Roosevelt Tessa       Patient brief summary:  Billy Rivera is a 55 y.o. male initiated on antimicrobial therapy with IV Vancomycin for treatment of suspected bone/joint infection    Drug Allergies:   Review of patient's allergies indicates:   Allergen Reactions    Penicillins Other (See Comments)     PCN allergy as a child - was told he went into a coma. Tolerates Cefazolin without adverse reactions    Shellfish containing products      Other reaction(s): Unknown    Vancomycin Itching    Bactrim  [sulfamethoxazole-trimethoprim] Rash       Actual Body Weight:   127 kg    Renal Function:   Estimated Creatinine Clearance: 97.3 mL/min (based on SCr of 1.2 mg/dL).,     Dialysis Method (if applicable):  N/A    CBC (last 72 hours):  Recent Labs   Lab Result Units 07/03/20  1530   WBC K/uL 12.93*   Hemoglobin g/dL 13.6*   Hematocrit % 41.0   Platelets K/uL 284   Gran% % 77.7*   Lymph% % 9.6*   Mono% % 11.3   Eosinophil% % 0.2   Basophil% % 0.2   Differential Method  Automated       Metabolic Panel (last 72 hours):  Recent Labs   Lab Result Units 07/03/20  1530   Sodium mmol/L 131*   Potassium mmol/L 4.6   Chloride mmol/L 97   CO2 mmol/L 23   Glucose mg/dL 407*   BUN, Bld mg/dL 17   Creatinine mg/dL 1.2   Albumin g/dL 2.7*   Total Bilirubin mg/dL 0.8   Alkaline Phosphatase U/L 130   AST U/L 16   ALT U/L 18       Drug levels (last 3 results):  No results for input(s):  VANCOMYCINRA, VANCOMYCINPE, VANCOMYCINTR in the last 72 hours.    Microbiologic Results:  Microbiology Results (last 7 days)     Procedure Component Value Units Date/Time    Blood culture (site 1) [[id number]]     Order Status: Sent Specimen: Blood     Blood culture (site 2) [[id number]]     Order Status: Sent Specimen: Blood     Blood culture x two cultures. Draw prior to antibiotics. [[id number]] Collected: 07/03/20 1450    Order Status: Sent Specimen: Blood from Peripheral, Forearm, Left Updated: 07/03/20 1626    Aerobic culture [[id number]] Collected: 07/03/20 1540    Order Status: Sent Specimen: Wound from Toe, Left Foot Updated: 07/03/20 1614    Blood culture x two cultures. Draw prior to antibiotics. [[id number]] Collected: 07/03/20 1541    Order Status: Sent Specimen: Blood from Peripheral, Antecubital, Right Updated: 07/03/20 1601

## 2020-07-04 NOTE — NURSING TRANSFER
Nursing Transfer Note      7/4/2020     Transfer To: 529[facility]    Transfer via stretcher    Transfer with NA    Transported by PCT    Medicines sent: insulin aspart    Chart send with patient: Yes    Patient reassessed at: 07/04/2020 @ 3739

## 2020-07-04 NOTE — PROGRESS NOTES
[facility] Hospital Medicine  Progress Note    Patient Name: Billy Sheffield Miguel  MRN: [id number]  Patient Class: IP- Inpatient   Admission Date: 7/3/2020  Length of Stay: 1 days  Attending Physician: Cece Feliciano MD  Primary Care Provider: BRAD Baker MD    [facility] Medicine Team: [facility] 5 Frances Sow MD    Subjective:     Principal Problem:Osteomyelitis of great toe of left foot        HPI:  55-year-old male with DM type 2, HLD, HTN presents to the ED for evaluation of toe infection. Patient reports increased swelling in left lower extremity and redness spreading further up left foot and lower leg. Patient had a bone biopsy obtained by Podiatry on 7/4/20. He was started on Cipro at that time, currently on IV Vancomycin drip. Culture is growing Enterococcus faecalis. Sensitivities are pending. Patient denies fever, chills, night sweats, dyspnea, changes in bowel or bladder habits. Reports feeling fatigued and nauseous recently. Also reports poor appetite for the past week with episodes of vomiting he ascribes to antibiotic regimen.          Overview/Hospital Course:  No notes on file    Interval History: No acute events overnight. Pt is stable, denies any fever, chills, headaches, or foot pain or numbness. Spoke to patient about his reported vancomycin allergy after a dose was given yesterday. He denied any itching, rash or respiratory issues after receiving medication. I have removed vancomycin from his allergy list.    Review of Systems   Constitutional: Negative for activity change, chills and fever.   HENT: Negative for congestion and sore throat.    Eyes: Negative for visual disturbance.   Respiratory: Negative for cough and shortness of breath.    Cardiovascular: Negative for chest pain and palpitations.   Gastrointestinal: Negative for abdominal pain, constipation, nausea and vomiting.   Genitourinary: Negative for difficulty urinating, dysuria and hematuria.    Musculoskeletal: Negative for back pain and myalgias.   Skin: Positive for color change and wound.   Neurological: Negative for dizziness, weakness and headaches.   Psychiatric/Behavioral: Negative for behavioral problems.     Objective:     Vital Signs (Most Recent):  Temp: 98.6 °F (37 °C) (07/04/20 0915)  Pulse: 64 (07/04/20 0915)  Resp: 14 (07/04/20 0915)  BP: 130/75 (07/04/20 0915)  SpO2: 95 % (07/04/20 0915) Vital Signs (24h Range):  Temp:  [97.7 °F (36.5 °C)-100.1 °F (37.8 °C)] 98.6 °F (37 °C)  Pulse:  [64-95] 64  Resp:  [14-20] 14  SpO2:  [90 %-98 %] 95 %  BP: (123-162)/(57-88) 130/75     Weight: 127 kg (280 lb)  Body mass index is 35.95 kg/m².    Intake/Output Summary (Last 24 hours) at 7/4/2020 1228  Last data filed at 7/4/2020 1218  Gross per 24 hour   Intake 500 ml   Output --   Net 500 ml      Physical Exam  Constitutional:       General: He is not in acute distress.  HENT:      Head: Normocephalic and atraumatic.   Cardiovascular:      Rate and Rhythm: Normal rate and regular rhythm.      Pulses: Normal pulses.      Heart sounds: Normal heart sounds. No murmur. No friction rub. No gallop.    Pulmonary:      Effort: Pulmonary effort is normal.      Breath sounds: Normal breath sounds. No wheezing, rhonchi or rales.   Abdominal:      General: Abdomen is flat. Bowel sounds are normal. There is no distension.      Palpations: Abdomen is soft.      Tenderness: There is no abdominal tenderness. There is no guarding.   Musculoskeletal: Normal range of motion.         General: No tenderness.   Skin:     General: Skin is warm.      Findings: Erythema (Left lower leg, expanding) and lesion (Left 1st toe open wound, with purulent drainge) present. No rash.   Neurological:      General: No focal deficit present.      Mental Status: He is alert and oriented to person, place, and time.      Motor: No weakness.   Psychiatric:         Mood and Affect: Mood normal.         Significant Labs:   Recent Lab Results        07/04/20  0732   07/04/20  0622   07/04/20  0324   07/03/20  2235   07/03/20 2020        Procalcitonin               Aerobic Bacterial Culture               Albumin     2.3         Alkaline Phosphatase     108         ALT     16         Anion Gap     10         aPTT         26.5  Comment:  aPTT therapeutic range = 39-69 seconds     AST     15         Baso #     0.05         Basophil%     0.4         BILIRUBIN TOTAL     0.5  Comment:  For infants and newborns, interpretation of results should be based  on gestational age, weight and in agreement with clinical  observations.  Premature Infant recommended reference ranges:  Up to 24 hours.............<8.0 mg/dL  Up to 48 hours............<12.0 mg/dL  3-5 days..................<15.0 mg/dL  6-29 days.................<15.0 mg/dL           Blood Culture, Routine         No Growth to date[P]              No Growth to date[P]     BUN, Bld     17         Calcium     8.3         Chloride     98         CO2     23         Creatinine     1.0         Differential Method     Automated         eGFR if      >60.0         eGFR if non      >60.0  Comment:  Calculation used to obtain the estimated glomerular filtration  rate (eGFR) is the CKD-EPI equation.            Eos #     0.1         Eosinophil%     1.0         Estimated Avg Glucose         292     Glucose     346         Gran # (ANC)     7.9         Gran%     70.5         Hematocrit     35.5         Hemoglobin     11.9         Hemoglobin A1C External         11.8  Comment:  ADA Screening Guidelines:  5.7-6.4%  Consistent with prediabetes  >or=6.5%  Consistent with diabetes  High levels of fetal hemoglobin interfere with the HbA1C  assay. Heterozygous hemoglobin variants (HbS, HgC, etc)do  not significantly interfere with this assay.   However, presence of multiple variants may affect accuracy.       Immature Grans (Abs)     0.11  Comment:  Mild elevation in immature granulocytes is non specific  and   can be seen in a variety of conditions including stress response,   acute inflammation, trauma and pregnancy. Correlation with other   laboratory and clinical findings is essential.           Immature Granulocytes     1.0         INR         1.0  Comment:  Coumadin Therapy:  2.0 - 3.0 for INR for all indicators except mechanical heart valves  and antiphospholipid syndromes which should use 2.5 - 3.5.       Lactate, Nando               Lymph #     1.8         Lymph%     15.8         Magnesium     1.7         MCH     28.8         MCHC     33.5         MCV     86         Mono #     1.3         Mono%     11.3         MPV     9.6         nRBC     0         Phosphorus     3.7         Platelets     265         POCT Glucose 293 274   311       Potassium     4.3         PROTEIN TOTAL     6.4         Protime         10.8     RBC     4.13         RDW     12.4         SARS-CoV-2 RNA, Amplification, Qual               Sed Rate     96   73     Sodium     131         WBC     11.23                          07/03/20  2019   07/03/20  1933   07/03/20  1726   07/03/20  1541   07/03/20  1540        Procalcitonin 0.17  Comment:  A concentration < 0.25 ng/mL represents a low risk bacterial   infection.  Procalcitonin may not be accurate among patients with localized   infection, recent trauma or major surgery, immunosuppressed state,   invasive fungal infection, renal dysfunction. Decisions regarding   initiation or continuation of antibiotic therapy should not be based   solely on procalcitonin levels.               Aerobic Bacterial Culture         Insufficient incubation, culture in progress[P]     Albumin               Alkaline Phosphatase               ALT               Anion Gap               aPTT               AST               Baso #               Basophil%               BILIRUBIN TOTAL               Blood Culture, Routine       No Growth to date[P]       BUN, Bld               Calcium               Chloride               CO2                Creatinine               Differential Method               eGFR if                eGFR if non                Eos #               Eosinophil%               Estimated Avg Glucose               Glucose               Gran # (ANC)               Gran%               Hematocrit               Hemoglobin               Hemoglobin A1C External               Immature Grans (Abs)               Immature Granulocytes               INR               Lactate, Nando 1.3  Comment:  Falsely low lactic acid results can be found in samples   containing >=13.0 mg/dL total bilirubin and/or >=3.5 mg/dL   direct bilirubin.               Lymph #               Lymph%               Magnesium               MCH               MCHC               MCV               Mono #               Mono%               MPV               nRBC               Phosphorus               Platelets               POCT Glucose   317 260         Potassium               PROTEIN TOTAL               Protime               RBC               RDW               SARS-CoV-2 RNA, Amplification, Qual               Sed Rate               Sodium               WBC                                07/03/20  1535   07/03/20  1530   07/03/20  1450        Procalcitonin           Aerobic Bacterial Culture           Albumin   2.7       Alkaline Phosphatase   130       ALT   18       Anion Gap   11       aPTT           AST   16       Baso #   0.03       Basophil%   0.2       BILIRUBIN TOTAL   0.8  Comment:  For infants and newborns, interpretation of results should be based  on gestational age, weight and in agreement with clinical  observations.  Premature Infant recommended reference ranges:  Up to 24 hours.............<8.0 mg/dL  Up to 48 hours............<12.0 mg/dL  3-5 days..................<15.0 mg/dL  6-29 days.................<15.0 mg/dL         Blood Culture, Routine     No Growth to date[P]     BUN, Bld   17       Calcium   8.9       Chloride    97       CO2   23       Creatinine   1.2       Differential Method   Automated       eGFR if    >60.0       eGFR if non    >60.0  Comment:  Calculation used to obtain the estimated glomerular filtration  rate (eGFR) is the CKD-EPI equation.          Eos #   0.0       Eosinophil%   0.2       Estimated Avg Glucose           Glucose   407       Gran # (ANC)   10.0       Gran%   77.7       Hematocrit   41.0       Hemoglobin   13.6       Hemoglobin A1C External           Immature Grans (Abs)   0.13  Comment:  Mild elevation in immature granulocytes is non specific and   can be seen in a variety of conditions including stress response,   acute inflammation, trauma and pregnancy. Correlation with other   laboratory and clinical findings is essential.         Immature Granulocytes   1.0       INR           Lactate, Nando   1.2  Comment:  Falsely low lactic acid results can be found in samples   containing >=13.0 mg/dL total bilirubin and/or >=3.5 mg/dL   direct bilirubin.         Lymph #   1.2       Lymph%   9.6       Magnesium           MCH   28.6       MCHC   33.2       MCV   86       Mono #   1.5       Mono%   11.3       MPV   9.7       nRBC   0       Phosphorus           Platelets   284       POCT Glucose           Potassium   4.6       PROTEIN TOTAL   7.5       Protime           RBC   4.75       RDW   12.3       SARS-CoV-2 RNA, Amplification, Qual Negative  Comment:  This test utilizes isothermal nucleic acid amplification   technology to detect the SARS-CoV-2 RdRp nucleic acid segment.   The analytical sensitivity (limit of detection) is 125 genome   equivalents/mL.   A POSITIVE result implies infection with the SARS-CoV-2 virus;  the patient is presumed to be contagious.    A NEGATIVE result means that SARS-CoV-2 nucleic acids are not  present above the limit of detection. A NEGATIVE result should be   treated as presumptive. It does not rule out the possibility of   COVID-19 and should  not be the sole basis for treatment decisions.   If COVID-19 is strongly suspected based on clinical and exposure   history, re-testing using an alternate molecular assay should be   considered.   This test is only for use under the Food and Drug   Administration s Emergency Use Authorization (EUA).   Commercial kits are provided by [email or web address].   Performance characteristics of the EUA have been independently  verified by [facility] Department of  Pathology and Laboratory Medicine.   _________________________________________________________________  The ID NOW COVID-19 Letter of Authorization, along with the   authorized Fact Sheet for Healthcare Providers, the authorized Fact  Sheet for Patients, and authorized labeling are available on the FDA   website:  www.fda.gov/MedicalDevices/Safety/EmergencySituations/scb363530.htm           Sed Rate           Sodium   131       WBC   12.93           All pertinent labs within the past 24 hours have been reviewed.    Significant Imaging: I have reviewed all pertinent imaging results/findings within the past 24 hours.      Assessment/Plan:      * Osteomyelitis of great toe of left foot  Left 1st  toe open wound with drainage, xray shows skin irregularity with scattered subcutaneous gas about the great toe. Bone biopsy performed by podiatry on 6/29/20, growing enterococcus faecalis. Pt was taking Cipro since 6/29.  -WBC 11.23, trending down, continue monitoring  - Sensitivities pending  - Podiatry following, had amputation today.  - Consulted ID for management  - On vanc        Diabetes mellitus with insulin therapy  Hgb A1C 13.1  - Levemir 45 units, aspart 3 units with meals  - SSI with accucheck acqhs      HTN (hypertension)  -Continue home meds: carvedilol 12.5 and lisinopril 40mg      CAD (coronary artery disease), native coronary artery  -Continue home ASA      Discharge planning issues  PT/OT eval for post surgery possible rehab  F/U with PCP for  better glucose control    Dyslipidemia  - Continue home atorvastatin        VTE Risk Mitigation (From admission, onward)         Ordered     enoxaparin injection 40 mg  Every 24 hours      07/03/20 1929     IP VTE HIGH RISK PATIENT  Once      07/03/20 1929     Place sequential compression device  Until discontinued      07/03/20 1918                    Frances Sow,   PGY-1 Internal Medicine  Department of Hospital Medicine   [facility]

## 2020-07-04 NOTE — ASSESSMENT & PLAN NOTE
55 y.o M presents with worsening L great toe ulceration despite compliance with PO antibiotics (clindamycin/cipro). Previous bone biopsy of L great toe now growing E. faecalis. Pending susceptibilities. WBC: 12k, ESR 73, Procal, lactate WNL. Xray of left foot with scattered subcutaneous gas about the great toe concerning for worsening gas-forming infection/cellulitis and ulceration. No definite osseous erosion or periosteal reaction appreciated.     Plan:  Continue IV abx  MRI pending  NPO past midnight for L great toe amputation tomorrow AM (7/4)  POC discussed with patient and family at bedside. They are amenable to plan.  Weightbearing as tolerated  Podiatry will continue to follow

## 2020-07-04 NOTE — HPI
Mr. Billy Rivera is a 55 y.o male with PMHx of of Type 2 DM, HLD, HTN, CAD, NSTEMI 2013 s/p stent placement in LAD, and colon cancer s/p resection in 2017 who presented to [facility] ED with a left foot infection.    Patient developed a left great toe wound 2 weeks prior 2/2 a new pair of shoes that were too tight. Patient was seen by PCP 6/28 and was prescribed 7 day  course of clindamycin for SSTI  which he reports compliance with. Xray of left foot from 6/28 with no convincing osseous destruction or soft tissue emphysema. Patient subsequently saw Dr. Melara in podiatry clinic the following day 6/29 and a left nail avulsion along with I&D of the left great toe was performed with purulence and malodor. Bone biopsy of the left distal phalanx was obtained and cipro was prescribed to take along with clindamycin. Previous bone culture now growing Enterococcus faecalis. Pending susceptibilities.    Patient reports worsening swelling and redness to the left foot now extending to the lower leg. He denies any pain, fever, or chills. Endorses to fatigue and decreased appetite x 4 days. Of note, he does report a hx of right diabetic foot infection, no osteomyelitis, no previous amputations.    In the ED,  WBC: 12k, ESR 73, Procal, lactate WNL. Xray of left foot with scattered subcutaneous gas about the great toe concerning for worsening gas-forming infection/cellulitis and ulceration. No definite osseous erosion or periosteal reaction appreciated. IV Vancomycin was initiated.

## 2020-07-04 NOTE — OP NOTE
Operative Note       Surgery Date: 7/4/2020     Surgeon(s) and Role:     * Bridget Santana DPM - Primary     * Phoebe Murphy DPM, PGY2 - Assisting    Pre-op Diagnosis:  Osteomyelitis of great toe of left foot [M86.9]    Post-op Diagnosis: Post-Op Diagnosis Codes:     * Osteomyelitis of great toe of left foot [M86.9]    Procedure(s) (LRB):  AMPUTATION, TOE (Left)    Anesthesia: Local MAC    Procedure in Detail/Findings:  The patient was brought to the operating room on a stretcher and transferred onto the operating table in a supine position. Following the successful induction of MAC anesthesia a local digital block to the left great toe was administered using 14 ml of a 1:1 mixture of 1% lidocaine plain and 0.5% Marcaine plain. The left foot was prepped and draped in a sterile manner. A timeout was performed verifying the patient's identity, surgical site, allergies, and medications. All were in agreement.    Attention was then directed to the left necrotic hallux toe where a racquet shaped incision was made going from dorsal to plantar to encompass the entire toe. Immediate malodorous, purulent drainage was appreciated upon incision. Using sharp and blunt dissection, the incision was deepened to the level of bone severing all neurovascular and tendinous structures. The dissection was taken proximally along the proximal phalanx until the 1st MTP joint was visualized.  The entire capsule along with all soft tissue attachments was released and the entire toe at the level of the 1st MTPJ was then disarticulated and sent to pathology. Bone from the base of the 1st proximal phalanx was procured to be sent as clean margins for antibiotic guidance. Deep wound cultures were obtained. The flexor and extensor tendons were excised as far proximally as possible.    The wound was then copiously irrigated via pulse lavage using 3L of sterile saline solution. At this time, there was no further purulence, abscess, tracking or  tunneling. 1st metatarsal head was hard, shiny, and healthy in appearance. Subcuticular tissue closure was achieved with 3-0 vicryl suture material. Skin was completely reapproximated with simple interrupted sutures using 4-0 nylon suture material.     The incision site was covered with adaptic, 4x4 gauze, kerlix, and ace.    The patient was transferred to the recovery room with vital signs stable and vascular status intact. Following a period of post op monitoring, the patient will be readmitted to the hospital with the following post op instructions:   1. Keep dressing dry and intact until Podiatry follow up.   2. Weightbearing status: NWB for 48 hours to left foot.  Afterwards, partial weightbearing to left heel in post op DARCO shoe.  3. All necessary prescriptions ordered and medical management will continue.   4. Contact Podiatry for all post op follow up care and if any problems arise.            Estimated Blood Loss: 45 mL           Specimens (From admission, onward)     Start     Ordered    07/04/20 1231  Specimen to Pathology, Surgery Other  Once     Question Answer Comment   Procedure Type: Other    Specimen Class: Routine/Screening        07/04/20 1232    07/04/20 1207  Specimen to Pathology, Surgery Other (Podiatry)  Once     Question Answer Comment   Procedure Type: Other Podiatry   Specimen Class: Routine/Screening        07/04/20 1206    07/04/20 1205  Specimen to Pathology, Surgery Gastrointestinal tract  Once,   Status:  Canceled     Question Answer Comment   Procedure Type: Gastrointestinal tract    Specimen Class: Routine/Screening        07/04/20 1204              Implants: * No implants in log *           Disposition: PACU - hemodynamically stable.           Condition: Good    Attestation:  I was present and scrubbed for the entire procedure.

## 2020-07-04 NOTE — CONSULTS
[facility]  Podiatry  Consult Note    Patient Name: Billy Rivera  MRN: [id number]  Admission Date: 7/3/2020  Hospital Length of Stay: 0 days  Attending Physician: Cece Feliciano MD  Primary Care Provider: BRAD Baker MD     Inpatient consult to Podiatry  Consult performed by: Phoebe Murphy MD  Consult ordered by: Cristina Severino PA-C        Subjective:     History of Present Illness:  Mr. Billy Rivera is a 55 y.o male with PMHx of of Type 2 DM, HLD, HTN, CAD, NSTEMI 2013 s/p stent placement in LAD, and colon cancer s/p resection in 2017 who presented to [facility] ED with a left foot infection.    Patient developed a left great toe wound 2 weeks prior 2/2 a new pair of shoes that were too tight. Patient was seen by PCP 6/28 and was prescribed 7 day  course of clindamycin for SSTI  which he reports compliance with. Xray of left foot from 6/28 with no convincing osseous destruction or soft tissue emphysema. Patient subsequently saw Dr. Melara in podiatry clinic the following day 6/29 and a left nail avulsion along with I&D of the left great toe was performed with purulence and malodor. Bone biopsy of the left distal phalanx was obtained and cipro was prescribed to take along with clindamycin. Previous bone culture now growing Enterococcus faecalis. Pending susceptibilities.    Patient reports worsening swelling and redness to the left foot now extending to the lower leg. He denies any pain, fever, or chills. Endorses to fatigue and decreased appetite x 4 days. Of note, he does report a hx of right diabetic foot infection, no osteomyelitis, no previous amputations.    In the ED,  WBC: 12k, ESR 73, Procal, lactate WNL. Xray of left foot with scattered subcutaneous gas about the great toe concerning for worsening gas-forming infection/cellulitis and ulceration. No definite osseous erosion or periosteal reaction appreciated. IV Vancomycin was initiated.    Scheduled Meds:   [START ON  7/4/2020] aspirin  81 mg Oral Daily    [START ON 7/4/2020] atorvastatin  80 mg Oral Daily    carvediloL  12.5 mg Oral BID    enoxaparin  40 mg Subcutaneous Q24H    insulin detemir U-100  40 Units Subcutaneous Daily    [START ON 7/4/2020] Lactobacillus rhamnosus GG  1 capsule Oral Daily    [START ON 7/4/2020] lisinopriL  40 mg Oral Daily    [START ON 7/4/2020] vancomycin (VANCOCIN) IVPB  1,500 mg Intravenous Q12H     Continuous Infusions:  PRN Meds:acetaminophen, albuterol-ipratropium, dextrose 50%, dextrose 50%, glucagon (human recombinant), glucose, glucose, insulin aspart U-100, ondansetron, ondansetron, promethazine, promethazine, sodium chloride 0.9%, Pharmacy to dose Vancomycin consult **AND** vancomycin - pharmacy to dose    Review of patient's allergies indicates:   Allergen Reactions    Penicillins Other (See Comments)     PCN allergy as a child - was told he went into a coma. Tolerates Cefazolin without adverse reactions    Shellfish containing products      Other reaction(s): Unknown    Vancomycin Itching    Bactrim  [sulfamethoxazole-trimethoprim] Rash        Past Medical History:   Diagnosis Date    Allergy     CAD (coronary artery disease), native coronary artery 6/25/2013    Cancer     Diabetes mellitus     Diabetes mellitus, type 2     Disorder of kidney and ureter     Heart attack 04/2012    Hx of colon cancer, stage I     Hyperlipidemia     Hypertension     Muscular pain     post-op after colonoscopy    MICHAELA (obstructive sleep apnea)      Past Surgical History:   Procedure Laterality Date    COLONOSCOPY N/A 2/17/2017    Procedure: COLONOSCOPY;  Surgeon: Simon Gomez MD;  Location: [facility] (4TH FLR);  Service: Endoscopy;  Laterality: N/A;    CORONARY ANGIOPLASTY      INCISION AND DRAINAGE FOOT Right 6/5/2018    Procedure: INCISION AND DRAINAGE, FOOT;  Surgeon: Bridget Santana DPM;  Location: [facility] OR 1ST FLR;  Service: Podiatry;  Laterality: Right;  Request mini C arm  in room. request wound vac with medium black sponge    INCISION AND DRAINAGE FOOT Right 6/7/2018    Procedure: INCISION AND DRAINAGE, FOOT;  Surgeon: Emelia Brock DPM;  Location: [facility] OR [address] [place] [address];  Service: Podiatry;  Laterality: Right;    INCISION AND DRAINAGE OF ABSCESS Right 6/2/2018    Procedure: INCISION AND DRAINAGE, ABSCESS;  Surgeon: Bridget Santana DPM;  [facility];  Service: General;  Laterality: Right;    REMOVAL OF FOREIGN BODY FROM FOOT Right 6/7/2018    Procedure: REMOVAL, FOREIGN BODY, FOOT;  Surgeon: Emelia Brock DPM;  Location: [facility] OR [address] [place] [address];  Service: Podiatry;  Laterality: Right;    TONSILLECTOMY         Family History     Problem Relation (Age of Onset)    Heart disease Mother, Brother    No Known Problems Father        Tobacco Use    Smoking status: Never Smoker    Smokeless tobacco: Never Used   Substance and Sexual Activity    Alcohol use: Yes     Comment: rare x1 beer-     Drug use: No    Sexual activity: Not Currently     Review of Systems   Constitutional: Positive for activity change, appetite change and fatigue. Negative for chills and fever.   Respiratory: Negative for cough and shortness of breath.    Cardiovascular: Positive for leg swelling.   Gastrointestinal: Negative for nausea and vomiting.   Musculoskeletal: Negative for arthralgias and myalgias.   Skin: Positive for color change and wound.   Neurological: Positive for numbness. Negative for weakness.   Hematological: Negative for adenopathy.   Psychiatric/Behavioral: Negative for confusion.     Objective:     Vital Signs (Most Recent):  Temp: 100.1 °F (37.8 °C) (07/03/20 1938)  Pulse: 75 (07/03/20 1938)  Resp: 20 (07/03/20 1938)  BP: (!) 162/87 (07/03/20 1938)  SpO2: 98 % (07/03/20 1938) Vital Signs (24h Range):  Temp:  [98 °F (36.7 °C)-100.1 °F (37.8 °C)] 100.1 °F (37.8 °C)  Pulse:  [74-95] 75  Resp:  [16-20] 20  SpO2:  [96 %-98 %] 98 %  BP: (136-162)/(83-88) 162/87     Weight: 127 kg (280 lb)  Body  mass index is 35.95 kg/m².    Foot Exam    Right Foot/Ankle     Inspection and Palpation  Ecchymosis: none  Tenderness: none   Skin Exam: callus; (R great toe callus)    Neurovascular  Dorsalis pedis: 1+  Posterior tibial: 1+  Saphenous nerve sensation: diminished  Tibial nerve sensation: diminished  Superficial peroneal nerve sensation: diminished  Deep peroneal nerve sensation: diminished  Sural nerve sensation: diminished      Left Foot/Ankle      Inspection and Palpation  Ecchymosis: none  Tenderness: none   Swelling: (+2 pitting edema to LLE)  Skin Exam: drainage, cellulitis, ulcer and erythema; (L great toe necrosis with purulent, maldorous drainage. Probes directly to bone. +Fluctuance. Erythema extending to mid lower leg)    Neurovascular  Dorsalis pedis: 1+  Posterior tibial: 1+  Saphenous nerve sensation: diminished  Tibial nerve sensation: diminished  Superficial peroneal nerve sensation: diminished  Deep peroneal nerve sensation: diminished  Sural nerve sensation: diminished            Laboratory:  CBC:   Recent Labs   Lab 07/03/20  1530   WBC 12.93*   RBC 4.75   HGB 13.6*   HCT 41.0      MCV 86   MCH 28.6   MCHC 33.2     CRP: No results for input(s): CRP in the last 168 hours.  ESR:   Recent Labs   Lab 07/03/20 2020   SEDRATE 73*     Wound Cultures:   Recent Labs   Lab 06/29/20  1308   LABAERO ENTEROCOCCUS FAECALIS  Many  Susceptibility pending  *     Microbiology Results (last 7 days)     Procedure Component Value Units Date/Time    Blood culture (site 1) [[id number]] Collected: 07/03/20 2020    Order Status: Sent Specimen: Blood Updated: 07/03/20 2035    Blood culture (site 2) [[id number]] Collected: 07/03/20 2020    Order Status: Sent Specimen: Blood Updated: 07/03/20 2035    Blood culture x two cultures. Draw prior to antibiotics. [[id number]] Collected: 07/03/20 1450    Order Status: Sent Specimen: Blood from Peripheral, Forearm, Left Updated: 07/03/20 1626    Aerobic culture [[id number]]  Collected: 07/03/20 1540    Order Status: Sent Specimen: Wound from Toe, Left Foot Updated: 07/03/20 1614    Blood culture x two cultures. Draw prior to antibiotics. [[id number]] Collected: 07/03/20 1541    Order Status: Sent Specimen: Blood from Peripheral, Antecubital, Right Updated: 07/03/20 1601        Specimen (12h ago, onward)    None          Diagnostic Results:  I have reviewed all pertinent imaging results/findings within the past 24 hours.   Imaging Results           X-Ray Toe 2 or More Views Left (Final result)  Result time 07/03/20 17:18:17    Final result by Simon Reyes MD (07/03/20 17:18:17)                 Impression:      As above.    This report was flagged in Epic as abnormal.      Electronically signed by: Simon Reyes MD  Date:    07/03/2020  Time:    17:18             Narrative:    EXAMINATION:  XR TOE 2 OR MORE VIEWS LEFT    CLINICAL HISTORY:  R/o osteomyelitis;    TECHNIQUE:  Three views of the left great toe    COMPARISON:  Left great toe series 06/28/2020    FINDINGS:  Skin irregularity with scattered subcutaneous gas about the great toe concerning for worsening gas-forming infection/cellulitis and ulceration, with sequela of recent procedure/surgery not excluded.    Slight hallux valgus configuration similar to prior.  Mild degenerative change at the 1st MTP joint.  No displaced fracture, dislocation or destructive osseous process, specifically no definite osseous erosion or periosteal reaction, noting that the radiographic appearance of osteomyelitis can lag clinical presentation up to 2 weeks.  Further evaluation with elective/nonemergent bone scan or MRI can be obtained as warranted.    No radiodense retained foreign body.  No other change.                                  Clinical Findings:  L great toe necrosis. Probes directly to bone in multiple areas. Purulent, malodorous drainage. + fluctuance. Erythema to left forefoot extending to mid left lower leg.    T            Assessment/Plan:     * Osteomyelitis of great toe of left foot  55 y.o M presents with worsening L great toe ulceration despite compliance with PO antibiotics (clindamycin/cipro). Previous bone biopsy of L great toe now growing E. faecalis. Pending susceptibilities. WBC: 12k, ESR 73, Procal, lactate WNL. Xray of left foot with scattered subcutaneous gas about the great toe concerning for worsening gas-forming infection/cellulitis and ulceration. No definite osseous erosion or periosteal reaction appreciated.     Plan:  Continue IV abx  MRI pending  NPO past midnight for L great toe amputation tomorrow AM (7/4)  POC discussed with patient and family at bedside. They are amenable to plan.  Weightbearing as tolerated  Podiatry will continue to follow          Thank you for your consult. I will follow-up with patient. Please contact us if you have any additional questions.    Phoebe Murphy DPM  [facility]  Podiatry PGY2  Pager: [phone]

## 2020-07-04 NOTE — PT/OT/SLP PROGRESS
Occupational Therapy      Patient Name:  Billy Sheffield Miguel   MRN:  [id number]    Patient not seen today secondary to Toe amp Sx this am. OT will follow-up *07/05/2020. Remains uninitiated RENÉ Briceno  7/4/2020

## 2020-07-04 NOTE — PT/OT/SLP PROGRESS
Physical Therapy      Patient Name:  Billy Sheffield Miguel   MRN:  [id number]    Attempted to see patient for PT; however patient off the floor for toe amputation. Physical therapy will follow-up with patient tomorrow.    Luis Espinoza, PT

## 2020-07-04 NOTE — ANESTHESIA PREPROCEDURE EVALUATION
07/04/2020  Billy Sheffield Miguel is a 55 y.o., male.    Anesthesia Evaluation    I have reviewed the Patient Summary Reports.      I have reviewed the Medications.     Review of Systems  Anesthesia Hx:  No problems with previous Anesthesia  History of prior surgery of interest to airway management or planning: Previous anesthesia: General   Social:  Non-Smoker, Social Alcohol Use    Hematology/Oncology:  Hematology Normal   Oncology Normal     EENT/Dental:EENT/Dental Normal   Cardiovascular:   Exercise tolerance: good Hypertension Past MI CABG/stent     Pulmonary:   Sleep Apnea, CPAP    Renal/:   Chronic Renal Disease    Hepatic/GI:  Hepatic/GI Normal    Neurological:  Neurology Normal    Endocrine:   Diabetes, well controlled, type 2, using insulin    Dermatological:  Skin Normal    Psych:  Psychiatric Normal           Physical Exam  General:  Well nourished    Airway/Jaw/Neck:  Airway Findings: Mouth Opening: Normal Tongue: Normal  General Airway Assessment: Adult  Mallampati: II  TM Distance: Normal, at least 6 cm  Jaw/Neck Findings:  Neck ROM: Normal ROM      Dental:  Dental Findings: In tact         Mental Status:  Mental Status Findings:  Cooperative, Alert and Oriented         Anesthesia Plan  Type of Anesthesia, risks & benefits discussed:  Anesthesia Type:  MAC  Patient's Preference: MAC  Intra-op Monitoring Plan: standard ASA monitors  Intra-op Monitoring Plan Comments:   Post Op Pain Control Plan: multimodal analgesia  Post Op Pain Control Plan Comments:   Induction:   IV  Beta Blocker:  Patient is not currently on a Beta-Blocker (No further documentation required).       Informed Consent: Patient understands risks and agrees with Anesthesia plan.  Questions answered. Anesthesia consent signed with patient.  ASA Score: 3     Day of Surgery Review of History & Physical:  There are no significant  changes.  H&P update referred to the surgeon.         Ready For Surgery From Anesthesia Perspective.

## 2020-07-04 NOTE — H&P
[facility] Medicine  History & Physical    Patient Name: Billy Sheffield Miguel  MRN: [id number]  Admission Date: 7/3/2020  Attending Physician: Cece Feliciano MD   Primary Care Provider: BRAD Baker MD    [facility] Medicine Team: Networked reference to record PCT  Frances Sow MD     Patient information was obtained from patient, past medical records and ER records.     Subjective:     Principal Problem:Osteomyelitis of great toe of left foot    Chief Complaint:   Chief Complaint   Patient presents with    Toe Pain     Patient sees podiatry and has redness and swelling to the left leg.          HPI: 55-year-old male with DM type 2, HLD, HTN presents to the ED for evaluation of toe infection. Patient reports increased swelling in left lower extremity and redness spreading further up left foot and lower leg. Patient had a bone biopsy obtained by Podiatry 4 days ago. He was started on Cipro at that time, currently on IV Vancomycin drip. Culture is growing Enterococcus faecalis. Sensitivities are pending. Patient denies fever, chills, night sweats, dyspnea, changes in bowel or bladder habits. Reports feeling fatigued and nauseous recently. Also reports poor appetite for the past week with episodes of vomiting he ascribes to antibiotic regimen.          Past Medical History:   Diagnosis Date    Allergy     CAD (coronary artery disease), native coronary artery 6/25/2013    Cancer     Diabetes mellitus     Diabetes mellitus, type 2     Disorder of kidney and ureter     Heart attack 04/2012    Hx of colon cancer, stage I     Hyperlipidemia     Hypertension     Muscular pain     post-op after colonoscopy    MICHAELA (obstructive sleep apnea)        Past Surgical History:   Procedure Laterality Date    COLONOSCOPY N/A 2/17/2017    Procedure: COLONOSCOPY;  Surgeon: Simon Gomez MD;  Location: [address];  Service: Endoscopy;  Laterality: N/A;    CORONARY ANGIOPLASTY       INCISION AND DRAINAGE FOOT Right 6/5/2018    Procedure: INCISION AND DRAINAGE, FOOT;  Surgeon: Bridget Santana DPM;  Location: [facility] OR 1ST FLR;  Service: Podiatry;  Laterality: Right;  Request mini C arm in room. request wound vac with medium black sponge    INCISION AND DRAINAGE FOOT Right 6/7/2018    Procedure: INCISION AND DRAINAGE, FOOT;  Surgeon: Emelia Brock DPM;  Location: [facility] OR 2ND FLR;  Service: Podiatry;  Laterality: Right;    INCISION AND DRAINAGE OF ABSCESS Right 6/2/2018    Procedure: INCISION AND DRAINAGE, ABSCESS;  Surgeon: Bridget Santana DPM;  Location: [facility] OR 2ND FLR;  Service: General;  Laterality: Right;    REMOVAL OF FOREIGN BODY FROM FOOT Right 6/7/2018    Procedure: REMOVAL, FOREIGN BODY, FOOT;  Surgeon: Emelia Brock DPM;  Location: [facility] OR 2ND FLR;  Service: Podiatry;  Laterality: Right;    TONSILLECTOMY         Review of patient's allergies indicates:   Allergen Reactions    Penicillins Other (See Comments)     PCN allergy as a child - was told he went into a coma. Tolerates Cefazolin without adverse reactions    Shellfish containing products      Other reaction(s): Unknown    Vancomycin Itching    Bactrim  [sulfamethoxazole-trimethoprim] Rash       No current facility-administered medications on file prior to encounter.      Current Outpatient Medications on File Prior to Encounter   Medication Sig    aspirin (ECOTRIN) 81 MG EC tablet Take 1 tablet (81 mg total) by mouth once daily.    atorvastatin (LIPITOR) 80 MG tablet Take 1 tablet (80 mg total) by mouth once daily.    blood sugar diagnostic Strp Strips and lancets    Use before meals and bedtime    carvediloL (COREG) 12.5 MG tablet 1 tablet by mouth 2 (two) times daily.    ciprofloxacin HCl (CIPRO) 500 MG tablet Take 1 tablet (500 mg total) by mouth 2 (two) times daily.    clindamycin (CLEOCIN) 300 MG capsule Take 1 capsule (300 mg total) by mouth every 8 (eight) hours. for 7 days    ibuprofen (ADVIL,MOTRIN)  "800 MG tablet Take 1 tablet (800 mg total) by mouth 3 (three) times daily as needed for Pain.    insulin (LANTUS SOLOSTAR U-100 INSULIN) glargine 100 units/mL (3mL) SubQ pen INJECT 55 UNITS SUBCUTANEOUSLY IN THE EVENING    Lactobacillus rhamnosus GG (CULTURELLE) 10 billion cell capsule Take 1 capsule by mouth once daily.    sgcrgusbw-C3-onB42-algal oil (METANX/FOLTANX RF) 3 mg-35 mg-2 mg -90.314 mg Cap Take one po daily    lisinopriL (PRINIVIL,ZESTRIL) 40 MG tablet Take 1 tablet (40 mg total) by mouth once daily.    metFORMIN (GLUCOPHAGE) 1000 MG tablet Take 1 tablet (1,000 mg total) by mouth 2 (two) times daily with meals.    mupirocin (BACTROBAN) 2 % ointment Apply to affected area 3 times daily    pen needle, diabetic 31 gauge x 3/16" Ndle Inject 1 each into the skin 3 (three) times daily before meals. (Patient taking differently: Inject 1 each into the skin 4 (four) times daily. )     Family History     Problem Relation (Age of Onset)    Heart disease Mother, Brother    No Known Problems Father        Tobacco Use    Smoking status: Never Smoker    Smokeless tobacco: Never Used   Substance and Sexual Activity    Alcohol use: Yes     Comment: rare x1 beer-     Drug use: No    Sexual activity: Not Currently     Review of Systems   Constitutional: Positive for activity change and fatigue. Negative for chills and fever.   HENT: Negative for congestion and sore throat.    Eyes: Negative for visual disturbance.   Respiratory: Negative for cough and shortness of breath.    Cardiovascular: Negative for chest pain, palpitations and leg swelling.   Gastrointestinal: Negative for abdominal pain, constipation, nausea and vomiting.   Genitourinary: Negative for difficulty urinating, dysuria and hematuria.   Musculoskeletal: Negative for back pain and myalgias.   Skin: Negative for rash.   Neurological: Positive for numbness (BLLE). Negative for dizziness, weakness and headaches.   Psychiatric/Behavioral: Negative " for behavioral problems.     Objective:     Vital Signs (Most Recent):  Temp: 100.1 °F (37.8 °C) (07/03/20 1938)  Pulse: 75 (07/03/20 1938)  Resp: 20 (07/03/20 1938)  BP: (!) 162/87 (07/03/20 1938)  SpO2: 98 % (07/03/20 1938) Vital Signs (24h Range):  Temp:  [98 °F (36.7 °C)-100.1 °F (37.8 °C)] 100.1 °F (37.8 °C)  Pulse:  [74-95] 75  Resp:  [16-20] 20  SpO2:  [96 %-98 %] 98 %  BP: (136-162)/(83-88) 162/87     Weight: 127 kg (280 lb)  Body mass index is 35.95 kg/m².    Physical Exam  Constitutional:       General: He is not in acute distress.  HENT:      Head: Normocephalic and atraumatic.   Cardiovascular:      Rate and Rhythm: Normal rate and regular rhythm.      Pulses: Normal pulses.      Heart sounds: Normal heart sounds. No murmur. No friction rub. No gallop.    Pulmonary:      Effort: Pulmonary effort is normal.      Breath sounds: Normal breath sounds. No wheezing, rhonchi or rales.   Abdominal:      General: Abdomen is flat. Bowel sounds are normal. There is no distension.      Palpations: Abdomen is soft.      Tenderness: There is no abdominal tenderness. There is no guarding.   Musculoskeletal: Normal range of motion.         General: No tenderness.   Feet:      Comments: Left 1st toe with open wound and serosanguineous drainage.   Skin:     General: Skin is warm.      Findings: Erythema (on left lower extremity) present.   Neurological:      Mental Status: He is alert and oriented to person, place, and time.      Sensory: Sensory deficit (decreased sensation BLLE) present.   Psychiatric:         Mood and Affect: Mood normal.             Significant Labs:   Recent Lab Results       07/03/20  1933   07/03/20  1535   07/03/20  1530        Albumin     2.7     Alkaline Phosphatase     130     ALT     18     Anion Gap     11     AST     16     Baso #     0.03     Basophil%     0.2     BILIRUBIN TOTAL     0.8  Comment:  For infants and newborns, interpretation of results should be based  on gestational age,  weight and in agreement with clinical  observations.  Premature Infant recommended reference ranges:  Up to 24 hours.............<8.0 mg/dL  Up to 48 hours............<12.0 mg/dL  3-5 days..................<15.0 mg/dL  6-29 days.................<15.0 mg/dL       BUN, Bld     17     Calcium     8.9     Chloride     97     CO2     23     Creatinine     1.2     Differential Method     Automated     eGFR if      >60.0     eGFR if non      >60.0  Comment:  Calculation used to obtain the estimated glomerular filtration  rate (eGFR) is the CKD-EPI equation.        Eos #     0.0     Eosinophil%     0.2     Glucose     407     Gran # (ANC)     10.0     Gran%     77.7     Hematocrit     41.0     Hemoglobin     13.6     Immature Grans (Abs)     0.13  Comment:  Mild elevation in immature granulocytes is non specific and   can be seen in a variety of conditions including stress response,   acute inflammation, trauma and pregnancy. Correlation with other   laboratory and clinical findings is essential.       Immature Granulocytes     1.0     Lactate, Nando     1.2  Comment:  Falsely low lactic acid results can be found in samples   containing >=13.0 mg/dL total bilirubin and/or >=3.5 mg/dL   direct bilirubin.       Lymph #     1.2     Lymph%     9.6     MCH     28.6     MCHC     33.2     MCV     86     Mono #     1.5     Mono%     11.3     MPV     9.7     nRBC     0     Platelets     284     POCT Glucose 317         Potassium     4.6     PROTEIN TOTAL     7.5     RBC     4.75     RDW     12.3     SARS-CoV-2 RNA, Amplification, Qual   Negative  Comment:  This test utilizes isothermal nucleic acid amplification   technology to detect the SARS-CoV-2 RdRp nucleic acid segment.   The analytical sensitivity (limit of detection) is 125 genome   equivalents/mL.   A POSITIVE result implies infection with the SARS-CoV-2 virus;  the patient is presumed to be contagious.    A NEGATIVE result means that  SARS-CoV-2 nucleic acids are not  present above the limit of detection. A NEGATIVE result should be   treated as presumptive. It does not rule out the possibility of   COVID-19 and should not be the sole basis for treatment decisions.   If COVID-19 is strongly suspected based on clinical and exposure   history, re-testing using an alternate molecular assay should be   considered.   This test is only for use under the Food and Drug   Administration s Emergency Use Authorization (EUA).   Commercial kits are provided by [employer].   Performance characteristics of the EUA have been independently  verified by [facility] Department of  Pathology and Laboratory Medicine.   _________________________________________________________________  The ID NOW COVID-19 Letter of Authorization, along with the   authorized Fact Sheet for Healthcare Providers, the authorized Fact  Sheet for Patients, and authorized labeling are available on the FDA   website:  www.fda.gov/MedicalDevices/Safety/EmergencySituations/uoj968137.htm         Sodium     131     WBC     12.93           Significant Imaging: I have reviewed all pertinent imaging results/findings within the past 24 hours.    Assessment/Plan:     * Osteomyelitis of great toe of left foot  Left 1st  toe open wound with drainage, xray shows skin irregularity with scattered subcutaneous gas about the great toe. Bone biopsy performed by podiatry on 6/29/20, growing enterococcus faecalis. Pt was taking Cipro since 6/29  - Sensitivities and MRI pending  - Podiatry consulted  - Started vacomycin      Diabetes mellitus with insulin therapy  Hgb pending  - Levemir 40 units  - SSI with accucheck [facility]      HTN (hypertension)  -Continue home meds: carvedilol 12.5 and lisinopril 40mg        VTE Risk Mitigation (From admission, onward)         Ordered     enoxaparin injection 40 mg  Every 24 hours      07/03/20 1929     IP VTE HIGH RISK PATIENT  Once      07/03/20 1929      Place sequential compression device  Until discontinued      07/03/20 1918                   Frances Sow, DO  PGY-1 Internal Medicine  Department of Hospital Medicine [facility]

## 2020-07-04 NOTE — TRANSFER OF CARE
"Anesthesia Transfer of Care Note    Patient: Billy Rivera    Procedure(s) Performed: Procedure(s) (LRB):  AMPUTATION, TOE (Left)    Patient location: PACU    Anesthesia Type: general    Transport from OR: Transported from OR on 6-10 L/min O2 by face mask with adequate spontaneous ventilation    Post pain: adequate analgesia    Post assessment: no apparent anesthetic complications and tolerated procedure well    Post vital signs: stable    Level of consciousness: sedated and responds to stimulation    Nausea/Vomiting: no nausea/vomiting    Complications: none    Transfer of care protocol was followed      Last vitals:   Visit Vitals  BP (!) 84/54   Pulse (!) 54   Temp 36.3 °C (97.3 °F) (Temporal)   Resp 14   Ht 6' 2" (1.88 m)   Wt 127 kg (280 lb)   SpO2 99%   BMI 35.95 kg/m²     " no fever and no chills.

## 2020-07-04 NOTE — SUBJECTIVE & OBJECTIVE
Scheduled Meds:   [START ON 7/4/2020] aspirin  81 mg Oral Daily    [START ON 7/4/2020] atorvastatin  80 mg Oral Daily    carvediloL  12.5 mg Oral BID    enoxaparin  40 mg Subcutaneous Q24H    insulin detemir U-100  40 Units Subcutaneous Daily    [START ON 7/4/2020] Lactobacillus rhamnosus GG  1 capsule Oral Daily    [START ON 7/4/2020] lisinopriL  40 mg Oral Daily    [START ON 7/4/2020] vancomycin (VANCOCIN) IVPB  1,500 mg Intravenous Q12H     Continuous Infusions:  PRN Meds:acetaminophen, albuterol-ipratropium, dextrose 50%, dextrose 50%, glucagon (human recombinant), glucose, glucose, insulin aspart U-100, ondansetron, ondansetron, promethazine, promethazine, sodium chloride 0.9%, Pharmacy to dose Vancomycin consult **AND** vancomycin - pharmacy to dose    Review of patient's allergies indicates:   Allergen Reactions    Penicillins Other (See Comments)     PCN allergy as a child - was told he went into a coma. Tolerates Cefazolin without adverse reactions    Shellfish containing products      Other reaction(s): Unknown    Vancomycin Itching    Bactrim  [sulfamethoxazole-trimethoprim] Rash        Past Medical History:   Diagnosis Date    Allergy     CAD (coronary artery disease), native coronary artery 6/25/2013    Cancer     Diabetes mellitus     Diabetes mellitus, type 2     Disorder of kidney and ureter     Heart attack 04/2012    Hx of colon cancer, stage I     Hyperlipidemia     Hypertension     Muscular pain     post-op after colonoscopy    MICHAELA (obstructive sleep apnea)      Past Surgical History:   Procedure Laterality Date    COLONOSCOPY N/A 2/17/2017    Procedure: COLONOSCOPY;  Surgeon: Simon Gomez MD;  Location: [facility] (4TH FLR);  Service: Endoscopy;  Laterality: N/A;    CORONARY ANGIOPLASTY      INCISION AND DRAINAGE FOOT Right 6/5/2018    Procedure: INCISION AND DRAINAGE, FOOT;  Surgeon: Bridget Santana DPM;  Location: [facility] OR 1ST FLR;  Service: Podiatry;  Laterality:  Right;  Request mini C arm in room. request wound vac with medium black sponge    INCISION AND DRAINAGE FOOT Right 6/7/2018    Procedure: INCISION AND DRAINAGE, FOOT;  Surgeon: Emelia Brock DPM;  [facility];  Service: Podiatry;  Laterality: Right;    INCISION AND DRAINAGE OF ABSCESS Right 6/2/2018    Procedure: INCISION AND DRAINAGE, ABSCESS;  Surgeon: Bridget Santana DPM;  [facility];  Service: General;  Laterality: Right;    REMOVAL OF FOREIGN BODY FROM FOOT Right 6/7/2018    Procedure: REMOVAL, FOREIGN BODY, FOOT;  Surgeon: Emelia Brock DPM;  [facility];  Service: Podiatry;  Laterality: Right;    TONSILLECTOMY         Family History     Problem Relation (Age of Onset)    Heart disease Mother, Brother    No Known Problems Father        Tobacco Use    Smoking status: Never Smoker    Smokeless tobacco: Never Used   Substance and Sexual Activity    Alcohol use: Yes     Comment: rare x1 beer-     Drug use: No    Sexual activity: Not Currently     Review of Systems   Constitutional: Positive for activity change, appetite change and fatigue. Negative for chills and fever.   Respiratory: Negative for cough and shortness of breath.    Cardiovascular: Positive for leg swelling.   Gastrointestinal: Negative for nausea and vomiting.   Musculoskeletal: Negative for arthralgias and myalgias.   Skin: Positive for color change and wound.   Neurological: Positive for numbness. Negative for weakness.   Hematological: Negative for adenopathy.   Psychiatric/Behavioral: Negative for confusion.     Objective:     Vital Signs (Most Recent):  Temp: 100.1 °F (37.8 °C) (07/03/20 1938)  Pulse: 75 (07/03/20 1938)  Resp: 20 (07/03/20 1938)  BP: (!) 162/87 (07/03/20 1938)  SpO2: 98 % (07/03/20 1938) Vital Signs (24h Range):  Temp:  [98 °F (36.7 °C)-100.1 °F (37.8 °C)] 100.1 °F (37.8 °C)  Pulse:  [74-95] 75  Resp:  [16-20] 20  SpO2:  [96 %-98 %] 98 %  BP: (136-162)/(83-88) 162/87      Weight: 127 kg (280 lb)  Body mass index is 35.95 kg/m².    Foot Exam    Right Foot/Ankle     Inspection and Palpation  Ecchymosis: none  Tenderness: none   Skin Exam: callus; (R great toe callus)    Neurovascular  Dorsalis pedis: 1+  Posterior tibial: 1+  Saphenous nerve sensation: diminished  Tibial nerve sensation: diminished  Superficial peroneal nerve sensation: diminished  Deep peroneal nerve sensation: diminished  Sural nerve sensation: diminished      Left Foot/Ankle      Inspection and Palpation  Ecchymosis: none  Tenderness: none   Swelling: (+2 pitting edema to LLE)  Skin Exam: drainage, cellulitis, ulcer and erythema; (L great toe necrosis with purulent, maldorous drainage. Probes directly to bone. +Fluctuance. Erythema extending to mid lower leg)    Neurovascular  Dorsalis pedis: 1+  Posterior tibial: 1+  Saphenous nerve sensation: diminished  Tibial nerve sensation: diminished  Superficial peroneal nerve sensation: diminished  Deep peroneal nerve sensation: diminished  Sural nerve sensation: diminished            Laboratory:  CBC:   Recent Labs   Lab 07/03/20  1530   WBC 12.93*   RBC 4.75   HGB 13.6*   HCT 41.0      MCV 86   MCH 28.6   MCHC 33.2     CRP: No results for input(s): CRP in the last 168 hours.  ESR:   Recent Labs   Lab 07/03/20 2020   SEDRATE 73*     Wound Cultures:   Recent Labs   Lab 06/29/20  1308   LABAERO ENTEROCOCCUS FAECALIS  Many  Susceptibility pending  *     Microbiology Results (last 7 days)     Procedure Component Value Units Date/Time    Blood culture (site 1) [[id number]] Collected: 07/03/20 2020    Order Status: Sent Specimen: Blood Updated: 07/03/20 2035    Blood culture (site 2) [[id number]] Collected: 07/03/20 2020    Order Status: Sent Specimen: Blood Updated: 07/03/20 2035    Blood culture x two cultures. Draw prior to antibiotics. [[id number]] Collected: 07/03/20 1450    Order Status: Sent Specimen: Blood from Peripheral, Forearm, Left Updated: 07/03/20 2154     Aerobic culture [[id number]] Collected: 07/03/20 1540    Order Status: Sent Specimen: Wound from Toe, Left Foot Updated: 07/03/20 1614    Blood culture x two cultures. Draw prior to antibiotics. [[id number]] Collected: 07/03/20 1541    Order Status: Sent Specimen: Blood from Peripheral, Antecubital, Right Updated: 07/03/20 1601        Specimen (12h ago, onward)    None          Diagnostic Results:  I have reviewed all pertinent imaging results/findings within the past 24 hours.   Imaging Results           X-Ray Toe 2 or More Views Left (Final result)  Result time 07/03/20 17:18:17    Final result by Simon Reyes MD (07/03/20 17:18:17)                 Impression:      As above.    This report was flagged in Epic as abnormal.      Electronically signed by: Simon Reyes MD  Date:    07/03/2020  Time:    17:18             Narrative:    EXAMINATION:  XR TOE 2 OR MORE VIEWS LEFT    CLINICAL HISTORY:  R/o osteomyelitis;    TECHNIQUE:  Three views of the left great toe    COMPARISON:  Left great toe series 06/28/2020    FINDINGS:  Skin irregularity with scattered subcutaneous gas about the great toe concerning for worsening gas-forming infection/cellulitis and ulceration, with sequela of recent procedure/surgery not excluded.    Slight hallux valgus configuration similar to prior.  Mild degenerative change at the 1st MTP joint.  No displaced fracture, dislocation or destructive osseous process, specifically no definite osseous erosion or periosteal reaction, noting that the radiographic appearance of osteomyelitis can lag clinical presentation up to 2 weeks.  Further evaluation with elective/nonemergent bone scan or MRI can be obtained as warranted.    No radiodense retained foreign body.  No other change.                                  Clinical Findings:  L great toe necrosis. Probes directly to bone in multiple areas. Purulent, malodorous drainage. + fluctuance. Erythema to left forefoot extending to mid left lower  leg.   T

## 2020-07-04 NOTE — ASSESSMENT & PLAN NOTE
Left 1st  toe open wound with drainage, xray shows skin irregularity with scattered subcutaneous gas about the great toe. Bone biopsy performed by podiatry on 6/29/20, growing enterococcus faecalis. Pt was taking Cipro since 6/29  - Sensitivities and MRI pending  - Podiatry consulted  - Started vacomycin

## 2020-07-04 NOTE — BRIEF OP NOTE
[facility]  Brief Operative Note    SUMMARY     Surgery Date: 7/4/2020     Surgeon(s) and Role:     * Bridget Santana DPM - Primary    Assisting Surgeon: Phoebe Murphy DPM- PGY2    Pre-op Diagnosis:  Osteomyelitis of great toe of left foot [M86.9]    Post-op Diagnosis:  Post-Op Diagnosis Codes:     * Osteomyelitis of great toe of left foot [M86.9]    Procedure(s) (LRB):  AMPUTATION, TOE (Left)    Anesthesia: Local MAC    Description of Procedure: Left great toe amputation via disarticulation at the level of the 1st MTP joint. Irrigation via pulse lavage with 3 L normal sterile saline solution. L great toe sent for pathology. Clean margins obtained from the proximal phalanx sent for culture and pathology. Deep wound cultures obtained.     Description of the findings of the procedure:   Purulent, malodorous drainage localized to the level of the 1st MTP joint. No further purulence appreciated following amputation and irrigation. No abscess, no tracking or tunneling of the surgical site. 1st metatarsal head appeared healthy and shiny.     Estimated Blood Loss: 45 mL         Specimens:   Specimen (12h ago, onward)    None

## 2020-07-04 NOTE — CONSULTS
"Nutrition-Related Diabetes Education      Time Spent: 15 minutes  Learners: Pt    Current HbA1c:  11.8%; previous 13.1% (4/2020); 12.5% (8/2019)    Is patient aware of their A1c and their goal A1c?  Informed pt goal A1C <7%    Home diabetes medication(s): insulin    Nutrition Education with handouts:  Counting Carbohydrates for People with Diabetes    Comments: Pt currently NPO, awaiting for great toe amputation. Pt reports not following a DM diet and consuming what "he wants". Encouraged pt for diet compliance-reviewed CHO foods, portion sizes, and consuming 3 meals/day. Encouraged daily BG level checks. Deferred insulin timing to endocrinologist. Alerted pt of risks associated with elevated HbA1C. Pt verbalized understanding.     Barriers to Learning: Desire/motivation    Follow up: 7/8/2020    Please consult as needed.  Thank you!    "

## 2020-07-04 NOTE — ASSESSMENT & PLAN NOTE
Left 1st  toe open wound with drainage, xray shows skin irregularity with scattered subcutaneous gas about the great toe. Bone biopsy performed by podiatry on 6/29/20, growing enterococcus faecalis. Pt was taking Cipro since 6/29.  -WBC 11.23, trending down, continue monitoring  - Sensitivities pending  - Podiatry following, had amputation today.  - Consulted ID for management  - On vanc

## 2020-07-04 NOTE — SUBJECTIVE & OBJECTIVE
Interval History: No acute events overnight. Pt is stable, denies any fever, chills, headaches, or foot pain or numbness. Spoke to patient about his reported vancomycin allergy after a dose was given yesterday. He denied any itching, rash or respiratory issues after receiving medication. I have removed vancomycin from his allergy list.    Review of Systems   Constitutional: Negative for activity change, chills and fever.   HENT: Negative for congestion and sore throat.    Eyes: Negative for visual disturbance.   Respiratory: Negative for cough and shortness of breath.    Cardiovascular: Negative for chest pain and palpitations.   Gastrointestinal: Negative for abdominal pain, constipation, nausea and vomiting.   Genitourinary: Negative for difficulty urinating, dysuria and hematuria.   Musculoskeletal: Negative for back pain and myalgias.   Skin: Positive for color change and wound.   Neurological: Negative for dizziness, weakness and headaches.   Psychiatric/Behavioral: Negative for behavioral problems.     Objective:     Vital Signs (Most Recent):  Temp: 98.6 °F (37 °C) (07/04/20 0915)  Pulse: 64 (07/04/20 0915)  Resp: 14 (07/04/20 0915)  BP: 130/75 (07/04/20 0915)  SpO2: 95 % (07/04/20 0915) Vital Signs (24h Range):  Temp:  [97.7 °F (36.5 °C)-100.1 °F (37.8 °C)] 98.6 °F (37 °C)  Pulse:  [64-95] 64  Resp:  [14-20] 14  SpO2:  [90 %-98 %] 95 %  BP: (123-162)/(57-88) 130/75     Weight: 127 kg (280 lb)  Body mass index is 35.95 kg/m².    Intake/Output Summary (Last 24 hours) at 7/4/2020 1228  Last data filed at 7/4/2020 1218  Gross per 24 hour   Intake 500 ml   Output --   Net 500 ml      Physical Exam  Constitutional:       General: He is not in acute distress.  HENT:      Head: Normocephalic and atraumatic.   Cardiovascular:      Rate and Rhythm: Normal rate and regular rhythm.      Pulses: Normal pulses.      Heart sounds: Normal heart sounds. No murmur. No friction rub. No gallop.    Pulmonary:      Effort:  Pulmonary effort is normal.      Breath sounds: Normal breath sounds. No wheezing, rhonchi or rales.   Abdominal:      General: Abdomen is flat. Bowel sounds are normal. There is no distension.      Palpations: Abdomen is soft.      Tenderness: There is no abdominal tenderness. There is no guarding.   Musculoskeletal: Normal range of motion.         General: No tenderness.   Skin:     General: Skin is warm.      Findings: Erythema (Left lower leg, expanding) and lesion (Left 1st toe open wound, with purulent drainge) present. No rash.   Neurological:      General: No focal deficit present.      Mental Status: He is alert and oriented to person, place, and time.      Motor: No weakness.   Psychiatric:         Mood and Affect: Mood normal.         Significant Labs:   Recent Lab Results       07/04/20  0732   07/04/20  0622   07/04/20  0324   07/03/20  2235   07/03/20  2020        Procalcitonin               Aerobic Bacterial Culture               Albumin     2.3         Alkaline Phosphatase     108         ALT     16         Anion Gap     10         aPTT         26.5  Comment:  aPTT therapeutic range = 39-69 seconds     AST     15         Baso #     0.05         Basophil%     0.4         BILIRUBIN TOTAL     0.5  Comment:  For infants and newborns, interpretation of results should be based  on gestational age, weight and in agreement with clinical  observations.  Premature Infant recommended reference ranges:  Up to 24 hours.............<8.0 mg/dL  Up to 48 hours............<12.0 mg/dL  3-5 days..................<15.0 mg/dL  6-29 days.................<15.0 mg/dL           Blood Culture, Routine         No Growth to date[P]              No Growth to date[P]     BUN, Bld     17         Calcium     8.3         Chloride     98         CO2     23         Creatinine     1.0         Differential Method     Automated         eGFR if      >60.0         eGFR if non       >60.0  Comment:  Calculation used to obtain the estimated glomerular filtration  rate (eGFR) is the CKD-EPI equation.            Eos #     0.1         Eosinophil%     1.0         Estimated Avg Glucose         292     Glucose     346         Gran # (ANC)     7.9         Gran%     70.5         Hematocrit     35.5         Hemoglobin     11.9         Hemoglobin A1C External         11.8  Comment:  ADA Screening Guidelines:  5.7-6.4%  Consistent with prediabetes  >or=6.5%  Consistent with diabetes  High levels of fetal hemoglobin interfere with the HbA1C  assay. Heterozygous hemoglobin variants (HbS, HgC, etc)do  not significantly interfere with this assay.   However, presence of multiple variants may affect accuracy.       Immature Grans (Abs)     0.11  Comment:  Mild elevation in immature granulocytes is non specific and   can be seen in a variety of conditions including stress response,   acute inflammation, trauma and pregnancy. Correlation with other   laboratory and clinical findings is essential.           Immature Granulocytes     1.0         INR         1.0  Comment:  Coumadin Therapy:  2.0 - 3.0 for INR for all indicators except mechanical heart valves  and antiphospholipid syndromes which should use 2.5 - 3.5.       Lactate, Nando               Lymph #     1.8         Lymph%     15.8         Magnesium     1.7         MCH     28.8         MCHC     33.5         MCV     86         Mono #     1.3         Mono%     11.3         MPV     9.6         nRBC     0         Phosphorus     3.7         Platelets     265         POCT Glucose 293 274   311       Potassium     4.3         PROTEIN TOTAL     6.4         Protime         10.8     RBC     4.13         RDW     12.4         SARS-CoV-2 RNA, Amplification, Qual               Sed Rate     96   73     Sodium     131         WBC     11.23                          07/03/20  2019   07/03/20  1933   07/03/20  1726   07/03/20  1541   07/03/20  1540        Procalcitonin  0.17  Comment:  A concentration < 0.25 ng/mL represents a low risk bacterial   infection.  Procalcitonin may not be accurate among patients with localized   infection, recent trauma or major surgery, immunosuppressed state,   invasive fungal infection, renal dysfunction. Decisions regarding   initiation or continuation of antibiotic therapy should not be based   solely on procalcitonin levels.               Aerobic Bacterial Culture         Insufficient incubation, culture in progress[P]     Albumin               Alkaline Phosphatase               ALT               Anion Gap               aPTT               AST               Baso #               Basophil%               BILIRUBIN TOTAL               Blood Culture, Routine       No Growth to date[P]       BUN, Bld               Calcium               Chloride               CO2               Creatinine               Differential Method               eGFR if                eGFR if non                Eos #               Eosinophil%               Estimated Avg Glucose               Glucose               Gran # (ANC)               Gran%               Hematocrit               Hemoglobin               Hemoglobin A1C External               Immature Grans (Abs)               Immature Granulocytes               INR               Lactate, Nando 1.3  Comment:  Falsely low lactic acid results can be found in samples   containing >=13.0 mg/dL total bilirubin and/or >=3.5 mg/dL   direct bilirubin.               Lymph #               Lymph%               Magnesium               MCH               MCHC               MCV               Mono #               Mono%               MPV               nRBC               Phosphorus               Platelets               POCT Glucose   317 260         Potassium               PROTEIN TOTAL               Protime               RBC               RDW               SARS-CoV-2 RNA, Amplification, Qual               Sed Rate                Sodium               WBC                                07/03/20  1535   07/03/20  1530   07/03/20  1450        Procalcitonin           Aerobic Bacterial Culture           Albumin   2.7       Alkaline Phosphatase   130       ALT   18       Anion Gap   11       aPTT           AST   16       Baso #   0.03       Basophil%   0.2       BILIRUBIN TOTAL   0.8  Comment:  For infants and newborns, interpretation of results should be based  on gestational age, weight and in agreement with clinical  observations.  Premature Infant recommended reference ranges:  Up to 24 hours.............<8.0 mg/dL  Up to 48 hours............<12.0 mg/dL  3-5 days..................<15.0 mg/dL  6-29 days.................<15.0 mg/dL         Blood Culture, Routine     No Growth to date[P]     BUN, Bld   17       Calcium   8.9       Chloride   97       CO2   23       Creatinine   1.2       Differential Method   Automated       eGFR if    >60.0       eGFR if non    >60.0  Comment:  Calculation used to obtain the estimated glomerular filtration  rate (eGFR) is the CKD-EPI equation.          Eos #   0.0       Eosinophil%   0.2       Estimated Avg Glucose           Glucose   407       Gran # (ANC)   10.0       Gran%   77.7       Hematocrit   41.0       Hemoglobin   13.6       Hemoglobin A1C External           Immature Grans (Abs)   0.13  Comment:  Mild elevation in immature granulocytes is non specific and   can be seen in a variety of conditions including stress response,   acute inflammation, trauma and pregnancy. Correlation with other   laboratory and clinical findings is essential.         Immature Granulocytes   1.0       INR           Lactate, Nando   1.2  Comment:  Falsely low lactic acid results can be found in samples   containing >=13.0 mg/dL total bilirubin and/or >=3.5 mg/dL   direct bilirubin.         Lymph #   1.2       Lymph%   9.6       Magnesium           MCH   28.6       MCHC   33.2       MCV    86       Mono #   1.5       Mono%   11.3       MPV   9.7       nRBC   0       Phosphorus           Platelets   284       POCT Glucose           Potassium   4.6       PROTEIN TOTAL   7.5       Protime           RBC   4.75       RDW   12.3       SARS-CoV-2 RNA, Amplification, Qual Negative  Comment:  This test utilizes isothermal nucleic acid amplification   technology to detect the SARS-CoV-2 RdRp nucleic acid segment.   The analytical sensitivity (limit of detection) is 125 genome   equivalents/mL.   A POSITIVE result implies infection with the SARS-CoV-2 virus;  the patient is presumed to be contagious.    A NEGATIVE result means that SARS-CoV-2 nucleic acids are not  present above the limit of detection. A NEGATIVE result should be   treated as presumptive. It does not rule out the possibility of   COVID-19 and should not be the sole basis for treatment decisions.   If COVID-19 is strongly suspected based on clinical and exposure   history, re-testing using an alternate molecular assay should be   considered.   This test is only for use under the Food and Drug   Administration s Emergency Use Authorization (EUA).   Commercial kits are provided by [employer].   Performance characteristics of the EUA have been independently  verified by [facility] Department of  Pathology and Laboratory Medicine.   _________________________________________________________________  The ID NOW COVID-19 Letter of Authorization, along with the   authorized Fact Sheet for Healthcare Providers, the authorized Fact  Sheet for Patients, and authorized labeling are available on the FDA   website:  www.fda.gov/MedicalDevices/Safety/EmergencySituations/ako394285.htm           Sed Rate           Sodium   131       WBC   12.93           All pertinent labs within the past 24 hours have been reviewed.    Significant Imaging: I have reviewed all pertinent imaging results/findings within the past 24 hours.

## 2020-07-05 LAB
ANION GAP SERPL CALC-SCNC: 9 MMOL/L (ref 8–16)
BACTERIA SPEC AEROBE CULT: NORMAL
BASOPHILS # BLD AUTO: 0.05 K/UL (ref 0–0.2)
BASOPHILS NFR BLD: 0.5 % (ref 0–1.9)
BUN SERPL-MCNC: 18 MG/DL (ref 6–20)
CALCIUM SERPL-MCNC: 8.7 MG/DL (ref 8.7–10.5)
CHLORIDE SERPL-SCNC: 97 MMOL/L (ref 95–110)
CO2 SERPL-SCNC: 26 MMOL/L (ref 23–29)
CREAT SERPL-MCNC: 1 MG/DL (ref 0.5–1.4)
DIFFERENTIAL METHOD: ABNORMAL
EOSINOPHIL # BLD AUTO: 0.2 K/UL (ref 0–0.5)
EOSINOPHIL NFR BLD: 2.1 % (ref 0–8)
ERYTHROCYTE [DISTWIDTH] IN BLOOD BY AUTOMATED COUNT: 12.4 % (ref 11.5–14.5)
ERYTHROCYTE [SEDIMENTATION RATE] IN BLOOD BY WESTERGREN METHOD: 103 MM/HR (ref 0–23)
EST. GFR  (AFRICAN AMERICAN): >60 ML/MIN/1.73 M^2
EST. GFR  (NON AFRICAN AMERICAN): >60 ML/MIN/1.73 M^2
GLUCOSE SERPL-MCNC: 322 MG/DL (ref 70–110)
HCT VFR BLD AUTO: 36.1 % (ref 40–54)
HGB BLD-MCNC: 12.2 G/DL (ref 14–18)
IMM GRANULOCYTES # BLD AUTO: 0.15 K/UL (ref 0–0.04)
IMM GRANULOCYTES NFR BLD AUTO: 1.5 % (ref 0–0.5)
LYMPHOCYTES # BLD AUTO: 1.3 K/UL (ref 1–4.8)
LYMPHOCYTES NFR BLD: 13.5 % (ref 18–48)
MAGNESIUM SERPL-MCNC: 1.8 MG/DL (ref 1.6–2.6)
MCH RBC QN AUTO: 28.8 PG (ref 27–31)
MCHC RBC AUTO-ENTMCNC: 33.8 G/DL (ref 32–36)
MCV RBC AUTO: 85 FL (ref 82–98)
MONOCYTES # BLD AUTO: 1.1 K/UL (ref 0.3–1)
MONOCYTES NFR BLD: 11.1 % (ref 4–15)
NEUTROPHILS # BLD AUTO: 7.1 K/UL (ref 1.8–7.7)
NEUTROPHILS NFR BLD: 71.3 % (ref 38–73)
NRBC BLD-RTO: 0 /100 WBC
PHOSPHATE SERPL-MCNC: 3.9 MG/DL (ref 2.7–4.5)
PLATELET # BLD AUTO: 299 K/UL (ref 150–350)
PMV BLD AUTO: 9.4 FL (ref 9.2–12.9)
POCT GLUCOSE: 251 MG/DL (ref 70–110)
POCT GLUCOSE: 257 MG/DL (ref 70–110)
POCT GLUCOSE: 347 MG/DL (ref 70–110)
POTASSIUM SERPL-SCNC: 4.4 MMOL/L (ref 3.5–5.1)
RBC # BLD AUTO: 4.23 M/UL (ref 4.6–6.2)
SODIUM SERPL-SCNC: 132 MMOL/L (ref 136–145)
VANCOMYCIN SERPL-MCNC: 10.7 UG/ML
VANCOMYCIN TROUGH SERPL-MCNC: 41.4 UG/ML (ref 10–22)
WBC # BLD AUTO: 9.92 K/UL (ref 3.9–12.7)

## 2020-07-05 PROCEDURE — 63600175 PHARM REV CODE 636 W HCPCS: Performed by: STUDENT IN AN ORGANIZED HEALTH CARE EDUCATION/TRAINING PROGRAM

## 2020-07-05 PROCEDURE — 83735 ASSAY OF MAGNESIUM: CPT

## 2020-07-05 PROCEDURE — 99223 PR INITIAL HOSPITAL CARE,LEVL III: ICD-10-PCS | Mod: ,,, | Performed by: NURSE PRACTITIONER

## 2020-07-05 PROCEDURE — 97530 THERAPEUTIC ACTIVITIES: CPT

## 2020-07-05 PROCEDURE — 36415 COLL VENOUS BLD VENIPUNCTURE: CPT

## 2020-07-05 PROCEDURE — 97161 PT EVAL LOW COMPLEX 20 MIN: CPT

## 2020-07-05 PROCEDURE — 97165 OT EVAL LOW COMPLEX 30 MIN: CPT

## 2020-07-05 PROCEDURE — 99499 NO LOS: ICD-10-PCS | Mod: ,,, | Performed by: NURSE PRACTITIONER

## 2020-07-05 PROCEDURE — 99223 1ST HOSP IP/OBS HIGH 75: CPT | Mod: ,,, | Performed by: NURSE PRACTITIONER

## 2020-07-05 PROCEDURE — 85025 COMPLETE CBC W/AUTO DIFF WBC: CPT

## 2020-07-05 PROCEDURE — 99232 SBSQ HOSP IP/OBS MODERATE 35: CPT | Mod: ,,, | Performed by: HOSPITALIST

## 2020-07-05 PROCEDURE — 85652 RBC SED RATE AUTOMATED: CPT

## 2020-07-05 PROCEDURE — 63600175 PHARM REV CODE 636 W HCPCS: Performed by: HOSPITALIST

## 2020-07-05 PROCEDURE — 11000001 HC ACUTE MED/SURG PRIVATE ROOM

## 2020-07-05 PROCEDURE — 97116 GAIT TRAINING THERAPY: CPT

## 2020-07-05 PROCEDURE — 99232 PR SUBSEQUENT HOSPITAL CARE,LEVL II: ICD-10-PCS | Mod: ,,, | Performed by: HOSPITALIST

## 2020-07-05 PROCEDURE — 84100 ASSAY OF PHOSPHORUS: CPT

## 2020-07-05 PROCEDURE — 25000003 PHARM REV CODE 250: Performed by: STUDENT IN AN ORGANIZED HEALTH CARE EDUCATION/TRAINING PROGRAM

## 2020-07-05 PROCEDURE — 80202 ASSAY OF VANCOMYCIN: CPT

## 2020-07-05 PROCEDURE — 99499 UNLISTED E&M SERVICE: CPT | Mod: ,,, | Performed by: NURSE PRACTITIONER

## 2020-07-05 PROCEDURE — 80202 ASSAY OF VANCOMYCIN: CPT | Mod: 91

## 2020-07-05 PROCEDURE — 80048 BASIC METABOLIC PNL TOTAL CA: CPT

## 2020-07-05 RX ORDER — INSULIN ASPART 100 [IU]/ML
8 INJECTION, SOLUTION INTRAVENOUS; SUBCUTANEOUS
Status: DISCONTINUED | OUTPATIENT
Start: 2020-07-05 | End: 2020-07-06 | Stop reason: HOSPADM

## 2020-07-05 RX ORDER — IBUPROFEN 200 MG
24 TABLET ORAL
Status: DISCONTINUED | OUTPATIENT
Start: 2020-07-06 | End: 2020-07-06 | Stop reason: HOSPADM

## 2020-07-05 RX ORDER — IBUPROFEN 200 MG
16 TABLET ORAL
Status: DISCONTINUED | OUTPATIENT
Start: 2020-07-06 | End: 2020-07-06 | Stop reason: HOSPADM

## 2020-07-05 RX ORDER — INSULIN ASPART 100 [IU]/ML
0-5 INJECTION, SOLUTION INTRAVENOUS; SUBCUTANEOUS
Status: DISCONTINUED | OUTPATIENT
Start: 2020-07-06 | End: 2020-07-06 | Stop reason: HOSPADM

## 2020-07-05 RX ORDER — INSULIN ASPART 100 [IU]/ML
6 INJECTION, SOLUTION INTRAVENOUS; SUBCUTANEOUS
Status: DISCONTINUED | OUTPATIENT
Start: 2020-07-05 | End: 2020-07-05

## 2020-07-05 RX ORDER — GLUCAGON 1 MG
1 KIT INJECTION
Status: DISCONTINUED | OUTPATIENT
Start: 2020-07-06 | End: 2020-07-06 | Stop reason: HOSPADM

## 2020-07-05 RX ADMIN — Medication 1 CAPSULE: at 08:07

## 2020-07-05 RX ADMIN — ATORVASTATIN CALCIUM 80 MG: 20 TABLET, FILM COATED ORAL at 08:07

## 2020-07-05 RX ADMIN — ASPIRIN 81 MG: 81 TABLET, COATED ORAL at 08:07

## 2020-07-05 RX ADMIN — VANCOMYCIN HYDROCHLORIDE 1500 MG: 1.5 INJECTION, POWDER, LYOPHILIZED, FOR SOLUTION INTRAVENOUS at 08:07

## 2020-07-05 RX ADMIN — INSULIN ASPART 3 UNITS: 100 INJECTION, SOLUTION INTRAVENOUS; SUBCUTANEOUS at 07:07

## 2020-07-05 RX ADMIN — ACETAMINOPHEN 650 MG: 325 TABLET ORAL at 12:07

## 2020-07-05 RX ADMIN — LISINOPRIL 40 MG: 20 TABLET ORAL at 08:07

## 2020-07-05 RX ADMIN — INSULIN ASPART 6 UNITS: 100 INJECTION, SOLUTION INTRAVENOUS; SUBCUTANEOUS at 11:07

## 2020-07-05 RX ADMIN — CARVEDILOL 12.5 MG: 12.5 TABLET, FILM COATED ORAL at 08:07

## 2020-07-05 RX ADMIN — INSULIN ASPART 8 UNITS: 100 INJECTION, SOLUTION INTRAVENOUS; SUBCUTANEOUS at 04:07

## 2020-07-05 RX ADMIN — ENOXAPARIN SODIUM 40 MG: 40 INJECTION SUBCUTANEOUS at 04:07

## 2020-07-05 NOTE — PLAN OF CARE
Pharmacy: [facility] [address] [place] [id number]  [phone]    Payor: [facility] ([id number])    Emergency Contact: Wife Toña miguel      visited pt at bedside. Wife present in room. Pt was in good spirits, alert and oriented, and able to verify information in assessment. Pt listed wife as his primary cg if/when needed. Pt reported no DME. Denied hx of dialysis tx and coumadin clinic visits. Pt stated that he will have a ride upon d/c.    No further needs stated at the time of assessment.       07/05/20 1622   Discharge Assessment   Assessment Type Discharge Planning Assessment   Confirmed/corrected address and phone number on facesheet? Yes   Assessment information obtained from? Patient   Communicated expected length of stay with patient/caregiver no  (not yet determined)   Prior to hospitilization cognitive status: Alert/Oriented   Prior to hospitalization functional status: Independent   Current cognitive status: Alert/Oriented   Current Functional Status: Independent   Facility Arrived From: home   Lives With spouse   Able to Return to Prior Arrangements   (not yet determined)   Is patient able to care for self after discharge? Unable to determine at this time (comments)   Who are your caregiver(s) and their phone number(s)? Wife Toña Miguel    Readmission Within the Last 30 Days unable to assess   Patient currently being followed by outpatient case management? Unable to determine (comments)   Patient currently receives any other outside agency services? No   Equipment Currently Used at Home none   Do you have any problems affording any of your prescribed medications? No   Is the patient taking medications as prescribed? yes   Does the patient have transportation home? Yes   Transportation Anticipated family or friend will provide   Dialysis Name and Scheduled days n/a   Does the patient receive services at the Coumadin Clinic? No   Discharge Plan A Home with  family   Discharge Plan B Home   DME Needed Upon Discharge  none   Patient/Family in Agreement with Plan yes       Alla Lemus LMSW  Case Management Social Worker   [facility]

## 2020-07-05 NOTE — PT/OT/SLP EVAL
"Physical Therapy Evaluation    Patient Name:  Billy Rivera   MRN:  [id number]    Recommendations:     Discharge Recommendations:  home with home health   Discharge Equipment Recommendations: bedside commode   Barriers to discharge: None    Assessment:       Billy Rivera is a 55 y.o. male admitted with a medical diagnosis of Osteomyelitis of great toe of left foot  S/p toe amputation.  He presents with the following impairments/functional limitations:  weakness, impaired endurance, impaired self care skills, impaired functional mobilty, impaired sensation, gait instability, impaired balance, pain, decreased safety awareness, decreased lower extremity function, impaired coordination, orthopedic precautions.      Patient demonstrates good motivation and good activity tolerance secondary to appropriate pain control.  Patient demonstrated decreased safety awareness and impulsiveness limiting overall independence.  Patient required between SBA and CGA for bed mobility, transfers and ambulation.  Patient ambulated 40 ft with rolling walker, CGA and appropriate maintenance of NWB status.  Patient will benefit from skilled PT for strengthening and mobility training to increase independence and home safety.      Rehab Prognosis: Good; patient would benefit from acute skilled PT services 5 x/week to address these deficits and reach maximum level of function.  Patient is most appropriate to go to home with home health .  Recent Surgery: Procedure(s) (LRB):  AMPUTATION, TOE (Left) 1 Day Post-Op    Plan:     During this hospitalization, patient to be seen 5 x/week to address the identified rehab impairments via gait training, therapeutic activities, therapeutic exercises, neuromuscular re-education and progress toward the following goals:    · Plan of Care Expires:  08/04/20    Subjective     Subjective:"I feel no pain in that foot."  Pain/Comfort:  · Pain Rating 1: 0/10  · Location - Side 1: Left  · Location 1: " foot  · Pain Addressed 1: Pre-medicate for activity, Reposition, Cessation of Activity    Patients cultural, spiritual, [employer]ec[employer]alism conflicts given the current situation: no    Living Environment:  Prior level of function: independent without an assistive device.   Residence: lives with their spouse 1-story house/ trailer, number of outside stairs: 3 steps with B handrails, tub-shower   Support available upon discharge: family  Equipment owned: wheelchair, walker, standard  Objective:     Communicated with RN prior to session.  Patient found supine with telemetry  upon PT entry to room.    General Precautions: Standard, fall   Orthopedic Precautions:LLE non weight bearing(NWB for 48 hrs after surgery then PWB on heel)   Braces: N/A     Exams:  · RLE ROM: WFL  · RLE Strength: WFL  · LLE ROM: Deficits: ankle grossly decreased secondary to dressing  · LLE Strength: Deficits: grossly 4/5  · Cognitive Exam:  Patient is oriented to Person, Place, Time and Situation  · Sensation:    · -       Impaired  light/touch decreased sensation B feet      Functional Mobility:  · Bed Mobility:     · Supine to Sit: stand by assistance  · Transfers:     · Sit to Stand:  contact guard assistance with rolling walker with decreased safety and impulsiveness  · Gait: Patient ambulated 40 ft with rolling walker and contact guard assistance.  · Balance:   · Sitting: GOOD: Maintains balance through MODERATE excursions of active trunk movement  · Standing: FAIR: Needs CONTACT GUARD during gait      Therapeutic Activities and Exercises:  Gait training:  Patient required cues for sequencing, upright posture and weight bearing status to increase independence and safety.  Patient required cues ~ 25% of the time.    Patient Education:    Patient educated on assistive device use, bed mobility training, Fall risk, gait training, home safety, Home exercise program and transfer training by explanation.  Patient was receptive to education and needs  reinforcement.     AM-PAC 6 CLICK MOBILITY  Total Score:19     Patient left up in chair with all lines intact and call button in reach.    GOALS:   Multidisciplinary Problems     Physical Therapy Goals        Problem: Physical Therapy Goal    Goal Priority Disciplines Outcome Goal Variances Interventions   Physical Therapy Goal     PT, PT/OT Ongoing, Progressing     Description: Goals to be met by: 20    Patient will increase functional independence with mobility by performin. Supine to sit with Set-up [place]  2. Sit to supine with Set-up [place]  3. Sit to stand transfer with Supervision  4. Gait  x 150 feet with Supervision using Rolling Walker.  5.  Patient will demonstrate independence with a home exercise program  6. Patient's balance will be GOOD: Needs SUPERVISION only during gait and able to self right with moderate LOB.  7. Ascend/descend 3 stair with bilateral Handrails Contact Guard Assistance.                      History:     Past Medical History:   Diagnosis Date    Allergy     CAD (coronary artery disease), native coronary artery 2013    Cancer     Diabetes mellitus     Diabetes mellitus, type 2     Disorder of kidney and ureter     Heart attack 2012    Hx of colon cancer, stage I     Hyperlipidemia     Hypertension     Muscular pain     post-op after colonoscopy    MICHAELA (obstructive sleep apnea)        Past Surgical History:   Procedure Laterality Date    COLONOSCOPY N/A 2017    Procedure: COLONOSCOPY;  Surgeon: Simon Gomez MD;  Location: [facility] (4TH FLR);  Service: Endoscopy;  Laterality: N/A;    CORONARY ANGIOPLASTY      INCISION AND DRAINAGE FOOT Right 2018    Procedure: INCISION AND DRAINAGE, FOOT;  Surgeon: Bridget Santana DPM;  Location: [facility] OR [address] [place] [address];  Service: Podiatry;  Laterality: Right;  Request mini C arm in room. request wound vac with medium black sponge    INCISION AND DRAINAGE FOOT Right 2018    Procedure: INCISION  AND DRAINAGE, FOOT;  Surgeon: Emelia Brock DPM;  Location: [address];  Service: Podiatry;  Laterality: Right;    INCISION AND DRAINAGE OF ABSCESS Right 6/2/2018    Procedure: INCISION AND DRAINAGE, ABSCESS;  Surgeon: Bridget Santana DPM;  Location: [facility] OR [address] [place] [address];  Service: General;  Laterality: Right;    REMOVAL OF FOREIGN BODY FROM FOOT Right 6/7/2018    Procedure: REMOVAL, FOREIGN BODY, FOOT;  Surgeon: Emelia Brock DPM;  Location: [facility] OR [address] [place] [address];  Service: Podiatry;  Laterality: Right;    TONSILLECTOMY         Time Tracking:     PT Received On: 07/05/20  PT Start Time: 0910     PT Stop Time: 0928  PT Total Time (min): 18 min     Billable Minutes: Evaluation 10 min Gait Training 8 min      Luis Espinoza, PT  07/05/2020

## 2020-07-05 NOTE — CONSULTS
[facility]  Infectious Disease  Consult Note    Patient Name: Billy Sheffield Miguel  MRN: [id number]  Admission Date: 7/3/2020  Hospital Length of Stay: 2 days  Attending Physician: Cece Feliciano MD  Primary Care Provider: BRAD Baker MD     Isolation Status: No active isolations         Inpatient consult to Infectious Diseases  Consult performed by: ADALBERTO Ewing, ANP  Consult ordered by: May Story MD  Reason for consult: OM of the left toe        ID Consult acknowledged.   Full consult and recommendations to follow today.   In the interim, please call for any immediate concerns.     Thank you.   ADALBERTO Finnegan, ANP-C  [facility] [id number]

## 2020-07-05 NOTE — PT/OT/SLP EVAL
Occupational Therapy   Evaluation and Discharge Note    Name: Billy Rivera  MRN: [id number]  Admitting Diagnosis:  Osteomyelitis of great toe of left foot 1 Day Post-Op    Recommendations:     Discharge Recommendations: home with home health  Discharge Equipment Recommendations:  bedside commode  Barriers to discharge:       Assessment:     Billy Rivera is a 55 y.o. male with a medical diagnosis of Osteomyelitis of great toe of left foot. At this time, patient is functioning at their prior level of function and does not require further acute OT services.     OT Acute eval complete. Pt ambulated ~50ft SBA using RW with no LOB or SOB. Pt able to complete ADLs SBA. Pt does not require Acute OT services at this time.     Plan:     During this hospitalization, patient does not require further acute OT services.  Please re-consult if situation changes.    · Plan of Care Reviewed with: patient    Subjective     Chief Complaint: to go home  Patient/Family Comments/goals: to get better    Occupational Profile:  Living Environment: Pt lives with spouse in [facility] with 3STE B railings.  Previous level of function: independent  Roles and Routines: retired  Equipment Used at home:  wheelchair, walker, standard  Assistance upon Discharge: Pt will assistance from spouse upon d/c.    Pain/Comfort:  · Pain Rating 1: 0/10  · Location - Side 1: Left  · Location 1: foot  · Pain Addressed 1: Pre-medicate for activity, Reposition, Distraction, Cessation of Activity    Patients cultural, spiritual, Confucianist conflicts given the current situation: no    Objective:     Communicated with: RN prior to session.  Patient found supine with telemetry upon OT entry to room.    General Precautions: Standard, fall   Orthopedic Precautions:LLE non weight bearing   Braces:       Occupational Performance:    Bed Mobility:    · Patient completed Scooting/Bridging with stand by assistance  · Patient completed Supine to Sit with stand by  assistance    Functional Mobility/Transfers:  · Patient completed Sit <> Stand Transfer with stand by assistance  with  rolling walker   · Patient completed Bed <> Chair Transfer using Step Transfer technique with stand by assistance with rolling walker  · Functional Mobility: OT Acute eval complete. Pt ambulated ~50ft SBA using RW with no LOB or SOB. Pt able to complete ADLs SBA. Pt does not require Acute OT services at this time.     Activities of Daily Living:  · Upper Body Dressing: minimum assistance lilly gown  · Lower Body Dressing: minimum assistance lilly socks    Cognitive/Visual Perceptual:  Cognitive/Psychosocial Skills:     -       Oriented to: Person, Place, Time and Situation   -       Safety awareness/insight to disability: intact     Physical Exam:  Upper Extremity Range of Motion:     -       Right Upper Extremity: WFL  -       Left Upper Extremity: WFL  Upper Extremity Strength:    -       Right Upper Extremity: WFL  -       Left Upper Extremity: WFL   Strength:    -       Right Upper Extremity: WFL  -       Left Upper Extremity: WFL    AMPAC 6 Click ADL:  AMPAC Total Score: 24    Treatment & Education:  - OT/POC-  - Importance of mobility to maximize recovery.  - Educated NWB LLE precautions  - safety w/ functional mobility; hand placement to ensure safe transfers to various surfaces in prep for ADLs  - Reviewed gait sequence and RW management via verbalization and demonstration   - encouraged to ambulate within household environment at least every hour to hour 1/2    Education:    Patient left up in chair with all lines intact, call button in reach and RN notified    GOALS:   Multidisciplinary Problems     Occupational Therapy Goals     Not on file                History:     Past Medical History:   Diagnosis Date    Allergy     CAD (coronary artery disease), native coronary artery 6/25/2013    Cancer     Diabetes mellitus     Diabetes mellitus, type 2     Disorder of kidney and ureter      Heart attack 04/2012    Hx of colon cancer, stage I     Hyperlipidemia     Hypertension     Muscular pain     post-op after colonoscopy    MICHAELA (obstructive sleep apnea)        Past Surgical History:   Procedure Laterality Date    COLONOSCOPY N/A 2/17/2017    Procedure: COLONOSCOPY;  Surgeon: Simon Gomez MD;  Location: [facility] (4TH FLR);  Service: Endoscopy;  Laterality: N/A;    CORONARY ANGIOPLASTY      INCISION AND DRAINAGE FOOT Right 6/5/2018    Procedure: INCISION AND DRAINAGE, FOOT;  Surgeon: Bridget Santana DPM;  Location: [facility] OR 1ST FLR;  Service: Podiatry;  Laterality: Right;  Request mini C arm in room. request wound vac with medium black sponge    INCISION AND DRAINAGE FOOT Right 6/7/2018    Procedure: INCISION AND DRAINAGE, FOOT;  Surgeon: Emelia Brock DPM;  Location: [facility] OR 2ND FLR;  Service: Podiatry;  Laterality: Right;    INCISION AND DRAINAGE OF ABSCESS Right 6/2/2018    Procedure: INCISION AND DRAINAGE, ABSCESS;  Surgeon: Bridget Santana DPM;  Location: [facility] OR 2ND FLR;  Service: General;  Laterality: Right;    REMOVAL OF FOREIGN BODY FROM FOOT Right 6/7/2018    Procedure: REMOVAL, FOREIGN BODY, FOOT;  Surgeon: Emelia Brock DPM;  Location: [facility] OR 2ND FLR;  Service: Podiatry;  Laterality: Right;    TONSILLECTOMY         Time Tracking:     OT Date of Treatment: 07/05/20  OT Start Time: 0909  OT Stop Time: 0928  OT Total Time (min): 19 min    Billable Minutes:Evaluation 10  Therapeutic Activity 9    Mily Vazquez OT  7/5/2020

## 2020-07-05 NOTE — ASSESSMENT & PLAN NOTE
Hgb A1C 13.1  - Increased: Levemir 48 units, aspart 6 units with meals  - Will monitor glucose and make adjustments as needed

## 2020-07-05 NOTE — PLAN OF CARE
OT Acute eval complete. Pt ambulated ~50ft SBA using RW with no LOB or SOB. Pt able to complete ADLs SBA. Pt does not require Acute OT services at this time.

## 2020-07-05 NOTE — SUBJECTIVE & OBJECTIVE
Past Medical History:   Diagnosis Date    Allergy     CAD (coronary artery disease), native coronary artery 6/25/2013    Cancer     Diabetes mellitus     Diabetes mellitus, type 2     Disorder of kidney and ureter     Heart attack 04/2012    Hx of colon cancer, stage I     Hyperlipidemia     Hypertension     Muscular pain     post-op after colonoscopy    MICHAELA (obstructive sleep apnea)        Past Surgical History:   Procedure Laterality Date    COLONOSCOPY N/A 2/17/2017    Procedure: COLONOSCOPY;  Surgeon: Simon Gomez MD;  Location: [facility] ENDO (4TH FLR);  Service: Endoscopy;  Laterality: N/A;    CORONARY ANGIOPLASTY      INCISION AND DRAINAGE FOOT Right 6/5/2018    Procedure: INCISION AND DRAINAGE, FOOT;  Surgeon: Bridget Santana DPM;  Location: [facility] OR 1ST FLR;  Service: Podiatry;  Laterality: Right;  Request mini C arm in room. request wound vac with medium black sponge    INCISION AND DRAINAGE FOOT Right 6/7/2018    Procedure: INCISION AND DRAINAGE, FOOT;  Surgeon: Emelia Brock DPM;  Location: [facility] OR 2ND FLR;  Service: Podiatry;  Laterality: Right;    INCISION AND DRAINAGE OF ABSCESS Right 6/2/2018    Procedure: INCISION AND DRAINAGE, ABSCESS;  Surgeon: Bridget Santana DPM;  Location: [facility] OR 2ND FLR;  Service: General;  Laterality: Right;    REMOVAL OF FOREIGN BODY FROM FOOT Right 6/7/2018    Procedure: REMOVAL, FOREIGN BODY, FOOT;  Surgeon: Emelia Brock DPM;  Location: [facility] OR 2ND FLR;  Service: Podiatry;  Laterality: Right;    TONSILLECTOMY         Review of patient's allergies indicates:   Allergen Reactions    Penicillins Other (See Comments)     PCN allergy as a child - was told he went into a coma. Tolerates Cefazolin without adverse reactions    Shellfish containing products      Other reaction(s): Unknown    Bactrim  [sulfamethoxazole-trimethoprim] Rash       Medications:  Medications Prior to Admission   Medication Sig    aspirin (ECOTRIN) 81 MG EC tablet Take 1 tablet (81  "mg total) by mouth once daily.    atorvastatin (LIPITOR) 80 MG tablet Take 1 tablet (80 mg total) by mouth once daily.    blood sugar diagnostic Strp Strips and lancets    Use before meals and bedtime    carvediloL (COREG) 12.5 MG tablet 1 tablet by mouth 2 (two) times daily.    ciprofloxacin HCl (CIPRO) 500 MG tablet Take 1 tablet (500 mg total) by mouth 2 (two) times daily.    clindamycin (CLEOCIN) 300 MG capsule Take 1 capsule (300 mg total) by mouth every 8 (eight) hours. for 7 days    ibuprofen (ADVIL,MOTRIN) 800 MG tablet Take 1 tablet (800 mg total) by mouth 3 (three) times daily as needed for Pain.    insulin (LANTUS SOLOSTAR U-100 INSULIN) glargine 100 units/mL (3mL) SubQ pen INJECT 55 UNITS SUBCUTANEOUSLY IN THE EVENING    Lactobacillus rhamnosus GG (CULTURELLE) 10 billion cell capsule Take 1 capsule by mouth once daily.    vhfchznkr-[id number]-nrK85-algal oil (METANX/FOLTANX RF) 3 mg-35 mg-2 mg -90.314 mg Cap Take one po daily    lisinopriL (PRINIVIL,ZESTRIL) 40 MG tablet Take 1 tablet (40 mg total) by mouth once daily.    metFORMIN (GLUCOPHAGE) 1000 MG tablet Take 1 tablet (1,000 mg total) by mouth 2 (two) times daily with meals.    mupirocin (BACTROBAN) 2 % ointment Apply to affected area 3 times daily    pen needle, diabetic 31 gauge x 3/16" Ndle Inject 1 each into the skin 3 (three) times daily before meals. (Patient taking differently: Inject 1 each into the skin 4 (four) times daily. )     Antibiotics (From admission, onward)    Start     Stop Route Frequency Ordered    07/05/20 2030  vancomycin 1.75 g in 5 % dextrose 500 mL IVPB      -- IV Every 12 hours (non-standard times) 07/05/20 1016    07/03/20 2012  vancomycin - pharmacy to dose  (vancomycin IVPB)      -- IV pharmacy to manage frequency 07/03/20 1912        Antifungals (From admission, onward)    None        Antivirals (From admission, onward)    None           Immunization History   Administered Date(s) Administered    Influenza - " Intradermal - Quadrivalent - PF 10/24/2013    Pneumococcal Polysaccharide - 23 Valent 06/29/2017    Tdap 09/08/2015       Family History     Problem Relation (Age of Onset)    Heart disease Mother, Brother    No Known Problems Father        Social History     Socioeconomic History    Marital status:      Spouse name: Not on file    Number of children: Not on file    Years of education: Not on file    Highest education level: Not on file   Occupational History    Not on file   Social Needs    Financial resource strain: Not on file    Food insecurity     Worry: Not on file     Inability: Not on file    Transportation needs     Medical: Not on file     Non-medical: Not on file   Tobacco Use    Smoking status: Never Smoker    Smokeless tobacco: Never Used   Substance and Sexual Activity    Alcohol use: Yes     Comment: rare x1 beer-     Drug use: No    Sexual activity: Not Currently   Lifestyle    Physical activity     Days per week: Not on file     Minutes per session: Not on file    Stress: Not on file   Relationships    Social connections     Talks on phone: Not on file     Gets together: Not on file     Attends Jain service: Not on file     Active member of club or organization: Not on file     Attends meetings of clubs or organizations: Not on file     Relationship status: Not on file   Other Topics Concern    Not on file   Social History Narrative    Not on file     Review of Systems   Constitutional: Negative for activity change, appetite change, chills, fatigue and fever.   HENT: Negative.    Eyes: Negative.    Respiratory: Negative for cough, shortness of breath and wheezing.    Cardiovascular: Positive for leg swelling. Negative for chest pain.   Gastrointestinal: Negative for abdominal pain, diarrhea, nausea and vomiting.   Genitourinary: Negative for dysuria.   Musculoskeletal: Positive for arthralgias. Negative for myalgias.   Skin: Positive for color change and wound.    Neurological: Negative for dizziness and numbness.   Psychiatric/Behavioral: Negative for agitation and confusion.     Objective:     Vital Signs (Most Recent):  Temp: 99.3 °F (37.4 °C) (07/05/20 1458)  Pulse: 65 (07/05/20 1458)  Resp: 18 (07/05/20 1458)  BP: (!) 140/82 (07/05/20 1458)  SpO2: 95 % (07/05/20 1458) Vital Signs (24h Range):  Temp:  [96.2 °F (35.7 °C)-100 °F (37.8 °C)] 99.3 °F (37.4 °C)  Pulse:  [60-71] 65  Resp:  [14-18] 18  SpO2:  [92 %-96 %] 95 %  BP: ()/(51-86) 140/82     Weight: 127 kg (280 lb)  Body mass index is 35.95 kg/m².    Estimated Creatinine Clearance: 116.8 mL/min (based on SCr of 1 mg/dL).    Physical Exam  Vitals signs and nursing note reviewed.   Constitutional:       General: He is not in acute distress.     Appearance: He is not ill-appearing, toxic-appearing or diaphoretic.   HENT:      Head: Normocephalic and atraumatic.   Eyes:      General: No scleral icterus.     Conjunctiva/sclera: Conjunctivae normal.   Neck:      Musculoskeletal: Normal range of motion.   Cardiovascular:      Rate and Rhythm: Normal rate and regular rhythm.   Pulmonary:      Effort: Pulmonary effort is normal.      Breath sounds: Normal breath sounds.   Abdominal:      Palpations: Abdomen is soft.   Musculoskeletal:         General: Swelling present.      Comments: Left foot with surgical dressing in place.  C/d/i.    Ascending erythema and warmth noted above dressing into ankle.   Podiatry photos of today reviewed. See below.    Skin:     General: Skin is warm and dry.   Neurological:      Mental Status: He is alert.   Psychiatric:         Mood and Affect: Mood normal.         Behavior: Behavior normal.       Post op      Pre-op        Significant Labs:   Blood Culture:   Recent Labs   Lab 07/03/20  1450 07/03/20  1541 07/03/20  2020   LABBLOO No Growth to date  No Growth to date No Growth to date  No Growth to date No Growth to date  No Growth to date  No Growth to date  No Growth to date      CBC:   Recent Labs   Lab 07/04/20 0324 07/05/20 0427   WBC 11.23 9.92   HGB 11.9* 12.2*   HCT 35.5* 36.1*    299     CMP:   Recent Labs   Lab 07/04/20 0324 07/05/20 0427   * 132*   K 4.3 4.4   CL 98 97   CO2 23 26   * 322*   BUN 17 18   CREATININE 1.0 1.0   CALCIUM 8.3* 8.7   PROT 6.4  --    ALBUMIN 2.3*  --    BILITOT 0.5  --    ALKPHOS 108  --    AST 15  --    ALT 16  --    ANIONGAP 10 9   EGFRNONAA >60.0 >60.0     Wound Culture:   Recent Labs   Lab 06/29/20  1308 07/03/20  1540 07/04/20  1212   LABAERO ENTEROCOCCUS FAECALIS  Many  * No significant isolate No growth  No growth       Significant Imaging: I have reviewed all pertinent imaging results/findings within the past 24 hours.

## 2020-07-05 NOTE — ASSESSMENT & PLAN NOTE
"   55 year old with DM, HTN, CAD, history of colon CA s/p resection in 2017 who presented to ED with worsening left toe swelling and erythema. Infection present approximately two weeks prior to admission and had been on cipro and clindamycin. Seen by Podiatry as outpatient and bone biopsy on 6/29.  Developed worsening swelling and ascending erythema.  Imaging concerning for gas forming infection/cellulitis.  No definite osseous erosion.   Bone cultures from biopsy showing E faecalis - ampicillin S.   Underwent left great toe amputation on 7/4.  Clean margins pending.  Per operative note, there was no abscess, tracking or tunneling. Remaining first metatarsal head appeared healthy and shiny.  ID consulted for antibiotic recommendations.        Isolate is ampicillin sensitive, but patient has reported severe PCN allergy ("coma" when he was a child).      Plan/recommendations:  1.  Continue IV vancomycin for now.  Pharmacy to dose. Trough goal 15-20  2.  Follow clean margin cultures and pathology.  If positive will need long term antibiotics.  If negative, will need two weeks abx for SSTI.  Unfortunately, no oral options available given PCN allergy.         "

## 2020-07-05 NOTE — ASSESSMENT & PLAN NOTE
55 y.o M presents with worsening L great toe ulceration despite compliance with PO antibiotics (clindamycin/cipro). Previous bone biopsy of L great toe now growing E. faecalis. Pending susceptibilities. WBC: 12k, ESR 73, Procal, lactate WNL. Xray of left foot with scattered subcutaneous gas about the great toe concerning for worsening gas-forming infection/cellulitis and ulceration. No definite osseous erosion or periosteal reaction appreciated.     Plan:  POD#1 s/p L great toe amputation.  Intraop wound cx, clean margins NGTD. Will continue to follow.  Continue IV abx. Currently on Vanc.  Partial weightbearing to left heel only in post op DARCO shoe for short distances <10 feet  Will order LLE arterial US with ABIs given weakly palpable pulses and duskiness to surgical skin margins. If results are concerning, will consult vascular.   Podiatry will continue to follow    Rest of care per primary

## 2020-07-05 NOTE — SUBJECTIVE & OBJECTIVE
Subjective:     Interval History:   POD# 1 s/p left hallux amputation (DOS: 7/4/2020) per Dr. Sudhir SANDERS. Afebrile. Remains hemodynamically stable. Patient denies any pedal or calf pain. Dressings to left foot c/d/i with no strikethrough.     Follow-up For: Procedure(s) (LRB):  AMPUTATION, TOE (Left)    Post-Operative Day: 1 Day Post-Op    Scheduled Meds:   aspirin  81 mg Oral Daily    atorvastatin  80 mg Oral Daily    carvediloL  12.5 mg Oral BID    enoxaparin  40 mg Subcutaneous Q24H    insulin aspart U-100  6 Units Subcutaneous TIDWM    insulin detemir U-100  45 Units Subcutaneous Daily    Lactobacillus rhamnosus GG  1 capsule Oral Daily    lisinopriL  40 mg Oral Daily    ondansetron  4 mg Intravenous Once    vancomycin (VANCOCIN) IVPB  1,750 mg Intravenous Q12H     Continuous Infusions:  PRN Meds:acetaminophen, albuterol-ipratropium, dextrose 50%, dextrose 50%, fentaNYL, glucagon (human recombinant), glucose, glucose, HYDROcodone-acetaminophen, ondansetron, ondansetron, promethazine, promethazine, sodium chloride 0.9%, sodium chloride 0.9%, Pharmacy to dose Vancomycin consult **AND** vancomycin - pharmacy to dose    Review of Systems   Constitutional: Positive for activity change. Negative for appetite change, chills, fatigue and fever.   Respiratory: Negative for cough and shortness of breath.    Cardiovascular: Positive for leg swelling.   Gastrointestinal: Negative for nausea and vomiting.   Musculoskeletal: Negative for arthralgias and myalgias.   Skin: Positive for color change and wound.   Neurological: Positive for numbness. Negative for weakness.   Hematological: Negative for adenopathy.   Psychiatric/Behavioral: Negative for confusion.     Objective:     Vital Signs (Most Recent):  Temp: 100 °F (37.8 °C) (07/05/20 0737)  Pulse: 71 (07/05/20 0737)  Resp: 14 (07/05/20 0737)  BP: (!) 156/86 (07/05/20 0737)  SpO2: (!) 94 % (07/05/20 0737) Vital Signs (24h Range):  Temp:  [96 °F (35.6 °C)-100  °F (37.8 °C)] 100 °F (37.8 °C)  Pulse:  [54-71] 71  Resp:  [14-18] 14  SpO2:  [92 %-100 %] 94 %  BP: ()/(51-86) 156/86     Weight: 127 kg (280 lb)  Body mass index is 35.95 kg/m².    Foot Exam    Right Foot/Ankle     Inspection and Palpation  Ecchymosis: none  Tenderness: none   Skin Exam: callus; (R great toe callus)    Neurovascular  Dorsalis pedis: 1+  Posterior tibial: 1+  Saphenous nerve sensation: diminished  Tibial nerve sensation: diminished  Superficial peroneal nerve sensation: diminished  Deep peroneal nerve sensation: diminished  Sural nerve sensation: diminished      Left Foot/Ankle      Inspection and Palpation  Ecchymosis: none  Tenderness: none   Swelling: (+2 pitting edema to LLE)  Skin Exam: drainage, cellulitis, ulcer and erythema; (s/p left hallux amp. Erythema from surgical site extending to mid lower leg)    Neurovascular  Dorsalis pedis: 1+  Posterior tibial: 1+  Saphenous nerve sensation: diminished  Tibial nerve sensation: diminished  Superficial peroneal nerve sensation: diminished  Deep peroneal nerve sensation: diminished  Sural nerve sensation: diminished            Laboratory:  CBC:   Recent Labs   Lab 07/05/20  0427   WBC 9.92   RBC 4.23*   HGB 12.2*   HCT 36.1*      MCV 85   MCH 28.8   MCHC 33.8     CRP: No results for input(s): CRP in the last 168 hours.  ESR:   Recent Labs   Lab 07/05/20  0427   SEDRATE 103*     Wound Cultures:   Recent Labs   Lab 06/29/20  1308 07/03/20  1540 07/04/20  1212   LABAERO ENTEROCOCCUS FAECALIS  Many  * No significant isolate No growth  No growth     Microbiology Results (last 7 days)     Procedure Component Value Units Date/Time    Aerobic culture [[id number]] Collected: 07/04/20 1212    Order Status: Completed Specimen: Skin from Toe, Left Foot Updated: 07/05/20 0808     Aerobic Bacterial Culture No growth    Narrative:      Left great toe    Aerobic culture [[id number]] Collected: 07/04/20 1212    Order Status: Completed Specimen: Body  Fluid from Toe, Left Foot Updated: 07/05/20 0807     Aerobic Bacterial Culture No growth    Narrative:      Left great toe swab    Aerobic culture [[id number]] Collected: 07/03/20 1540    Order Status: Completed Specimen: Wound from Toe, Left Foot Updated: 07/05/20 0737     Aerobic Bacterial Culture No significant isolate    Blood culture (site 1) [[id number]] Collected: 07/03/20 2020    Order Status: Completed Specimen: Blood Updated: 07/04/20 2212     Blood Culture, Routine No Growth to date      No Growth to date    Narrative:      Site # 1, aerobic and anaerobic    Blood culture (site 2) [[id number]] Collected: 07/03/20 2020    Order Status: Completed Specimen: Blood Updated: 07/04/20 2212     Blood Culture, Routine No Growth to date      No Growth to date    Narrative:      Site # 2, aerobic only    Blood culture x two cultures. Draw prior to antibiotics. [[id number]] Collected: 07/03/20 1541    Order Status: Completed Specimen: Blood from Peripheral, Antecubital, Right Updated: 07/04/20 1812     Blood Culture, Routine No Growth to date      No Growth to date    Narrative:      Aerobic and anaerobic    Blood culture x two cultures. Draw prior to antibiotics. [[id number]] Collected: 07/03/20 1450    Order Status: Completed Specimen: Blood from Peripheral, Forearm, Left Updated: 07/04/20 1812     Blood Culture, Routine No Growth to date      No Growth to date    Narrative:      Aerobic and anaerobic    Gram stain [[id number]] Collected: 07/04/20 1212    Order Status: Completed Specimen: Tissue from Toe, Left Foot Updated: 07/04/20 1337     Gram Stain Result No WBC's      No organisms seen    Narrative:      Left great toe    Culture, Anaerobe [[id number]] Collected: 07/04/20 1212    Order Status: Sent Specimen: Tissue from Toe, Left Foot Updated: 07/04/20 1256    Fungus culture [[id number]] Collected: 07/04/20 1212    Order Status: Sent Specimen: Tissue from Toe, Left Foot Updated: 07/04/20 1255    AFB Culture & Smear  [[id number]] Collected: 07/04/20 1212    Order Status: Sent Specimen: Tissue from Toe, Left Foot Updated: 07/04/20 1254    Culture, Anaerobe [[id number]] Collected: 07/04/20 1212    Order Status: Sent Specimen: Toe, Left Foot Updated: 07/04/20 1253        Specimen (12h ago, onward)    None          Diagnostic Results:  I have reviewed all pertinent imaging results/findings within the past 24 hours.    Clinical Findings:  POD# 1 s/p left hallux amputation (DOS:7/4/2020). Surgical site c/d/i. Sutures intact, no signs of wound dehiscence, no drainage. Surgical margins appear slightly dusky and erythematous. Previous erythema to left foot and lower leg still remains but appears to be improving. Still with increased warmth to LLE. No fluctuance or crepitus.

## 2020-07-05 NOTE — ASSESSMENT & PLAN NOTE
"   55 year old with DM, HTN, CAD, history of colon CA s/p resection in 2017 who presented to ED with worsening left toe swelling and erythema. Infection present approximately two weeks prior to admission and had been on cipro and clindamycin. Seen by Podiatry as outpatient and bone biopsy on 6/29.  Developed worsening swelling and ascending erythema.  Imaging concerning for gas forming infection/cellulitis.  No definite osseous erosion.   Bone cultures from biopsy showing E faecalis - ampicillin S.   Underwent left great toe amputation on 7/4.  Clean margins pending.  Per operative note, there was no abscess, tracking or tunneling. Remaining first metatarsal head appeared healthy and shiny.  ID consulted for antibiotic recommendations.        Isolate is ampicillin sensitive, but patient has reported severe PCN allergy ("coma" when he was a child).        Patient afebrile, no leukocytosis.  Blood cultures NGTD.  Hemodynamically stable.  Per Podiatry note today, surgical site somewhat dusky.  ABIs pending.     Plan/recommendations:  1.  Continue IV vancomycin for now for prior e faecalis.  Pharmacy to dose. Trough goal 15-20  2.  Follow clean margin cultures and pathology.  If positive will need long term antibiotics.  If negative, will need two weeks oral abx.  Given reported severe PCN allergy, may be able to consider short course of linezolid.  Will confirm susceptibility with micro.   3.  Follow up ABIs/vascular evaluation   4. Recommend PCN allergy testing as outpatient  5.  Will follow up tomorrow.     Data reviewed and plan discussed with ID staff      "

## 2020-07-05 NOTE — PROGRESS NOTES
[facility]  Podiatry  Progress Note    Patient Name: Billy Sheffield Miguel  MRN: [id number]  Admission Date: 7/3/2020  Hospital Length of Stay: 2 days  Attending Physician: Cece Feliciano MD  Primary Care Provider: BRAD Baker MD     Subjective:     Interval History:   POD# 1 s/p left hallux amputation (DOS: 7/4/2020) per Dr. Santana. ALEXYSEON. Afebrile. Remains hemodynamically stable. Patient denies any pedal or calf pain. Dressings to left foot c/d/i with no strikethrough.     Follow-up For: Procedure(s) (LRB):  AMPUTATION, TOE (Left)    Post-Operative Day: 1 Day Post-Op    Scheduled Meds:   aspirin  81 mg Oral Daily    atorvastatin  80 mg Oral Daily    carvediloL  12.5 mg Oral BID    enoxaparin  40 mg Subcutaneous Q24H    insulin aspart U-100  6 Units Subcutaneous TIDWM    insulin detemir U-100  45 Units Subcutaneous Daily    Lactobacillus rhamnosus GG  1 capsule Oral Daily    lisinopriL  40 mg Oral Daily    ondansetron  4 mg Intravenous Once    vancomycin (VANCOCIN) IVPB  1,750 mg Intravenous Q12H     Continuous Infusions:  PRN Meds:acetaminophen, albuterol-ipratropium, dextrose 50%, dextrose 50%, fentaNYL, glucagon (human recombinant), glucose, glucose, HYDROcodone-acetaminophen, ondansetron, ondansetron, promethazine, promethazine, sodium chloride 0.9%, sodium chloride 0.9%, Pharmacy to dose Vancomycin consult **AND** vancomycin - pharmacy to dose    Review of Systems   Constitutional: Positive for activity change. Negative for appetite change, chills, fatigue and fever.   Respiratory: Negative for cough and shortness of breath.    Cardiovascular: Positive for leg swelling.   Gastrointestinal: Negative for nausea and vomiting.   Musculoskeletal: Negative for arthralgias and myalgias.   Skin: Positive for color change and wound.   Neurological: Positive for numbness. Negative for weakness.   Hematological: Negative for adenopathy.   Psychiatric/Behavioral: Negative for confusion.      Objective:     Vital Signs (Most Recent):  Temp: 100 °F (37.8 °C) (07/05/20 0737)  Pulse: 71 (07/05/20 0737)  Resp: 14 (07/05/20 0737)  BP: (!) 156/86 (07/05/20 0737)  SpO2: (!) 94 % (07/05/20 0737) Vital Signs (24h Range):  Temp:  [96 °F (35.6 °C)-100 °F (37.8 °C)] 100 °F (37.8 °C)  Pulse:  [54-71] 71  Resp:  [14-18] 14  SpO2:  [92 %-100 %] 94 %  BP: ()/(51-86) 156/86     Weight: 127 kg (280 lb)  Body mass index is 35.95 kg/m².    Foot Exam    Right Foot/Ankle     Inspection and Palpation  Ecchymosis: none  Tenderness: none   Skin Exam: callus; (R great toe callus)    Neurovascular  Dorsalis pedis: 1+  Posterior tibial: 1+  Saphenous nerve sensation: diminished  Tibial nerve sensation: diminished  Superficial peroneal nerve sensation: diminished  Deep peroneal nerve sensation: diminished  Sural nerve sensation: diminished      Left Foot/Ankle      Inspection and Palpation  Ecchymosis: none  Tenderness: none   Swelling: (+2 pitting edema to LLE)  Skin Exam: drainage, cellulitis, ulcer and erythema; (s/p left hallux amp. Erythema from surgical site extending to mid lower leg)    Neurovascular  Dorsalis pedis: 1+  Posterior tibial: 1+  Saphenous nerve sensation: diminished  Tibial nerve sensation: diminished  Superficial peroneal nerve sensation: diminished  Deep peroneal nerve sensation: diminished  Sural nerve sensation: diminished            Laboratory:  CBC:   Recent Labs   Lab 07/05/20  0427   WBC 9.92   RBC 4.23*   HGB 12.2*   HCT 36.1*      MCV 85   MCH 28.8   MCHC 33.8     CRP: No results for input(s): CRP in the last 168 hours.  ESR:   Recent Labs   Lab 07/05/20  0427   SEDRATE 103*     Wound Cultures:   Recent Labs   Lab 06/29/20  1308 07/03/20  1540 07/04/20  1212   LABAERO ENTEROCOCCUS FAECALIS  Many  * No significant isolate No growth  No growth     Microbiology Results (last 7 days)     Procedure Component Value Units Date/Time    Aerobic culture [[id number]] Collected: 07/04/20 1212     Order Status: Completed Specimen: Skin from Toe, Left Foot Updated: 07/05/20 0808     Aerobic Bacterial Culture No growth    Narrative:      Left great toe    Aerobic culture [[id number]] Collected: 07/04/20 1212    Order Status: Completed Specimen: Body Fluid from Toe, Left Foot Updated: 07/05/20 0807     Aerobic Bacterial Culture No growth    Narrative:      Left great toe swab    Aerobic culture [[id number]] Collected: 07/03/20 1540    Order Status: Completed Specimen: Wound from Toe, Left Foot Updated: 07/05/20 0737     Aerobic Bacterial Culture No significant isolate    Blood culture (site 1) [[id number]] Collected: 07/03/20 2020    Order Status: Completed Specimen: Blood Updated: 07/04/20 2212     Blood Culture, Routine No Growth to date      No Growth to date    Narrative:      Site # 1, aerobic and anaerobic    Blood culture (site 2) [[id number]] Collected: 07/03/20 2020    Order Status: Completed Specimen: Blood Updated: 07/04/20 2212     Blood Culture, Routine No Growth to date      No Growth to date    Narrative:      Site # 2, aerobic only    Blood culture x two cultures. Draw prior to antibiotics. [[id number]] Collected: 07/03/20 1541    Order Status: Completed Specimen: Blood from Peripheral, Antecubital, Right Updated: 07/04/20 1812     Blood Culture, Routine No Growth to date      No Growth to date    Narrative:      Aerobic and anaerobic    Blood culture x two cultures. Draw prior to antibiotics. [[id number]] Collected: 07/03/20 1450    Order Status: Completed Specimen: Blood from Peripheral, Forearm, Left Updated: 07/04/20 1812     Blood Culture, Routine No Growth to date      No Growth to date    Narrative:      Aerobic and anaerobic    Gram stain [[id number]] Collected: 07/04/20 1212    Order Status: Completed Specimen: Tissue from Toe, Left Foot Updated: 07/04/20 1337     Gram Stain Result No WBC's      No organisms seen    Narrative:      Left great toe    Culture, Anaerobe [[id number]] Collected:  07/04/20 1212    Order Status: Sent Specimen: Tissue from Toe, Left Foot Updated: 07/04/20 1256    Fungus culture [[id number]] Collected: 07/04/20 1212    Order Status: Sent Specimen: Tissue from Toe, Left Foot Updated: 07/04/20 1255    AFB Culture & Smear [[id number]] Collected: 07/04/20 1212    Order Status: Sent Specimen: Tissue from Toe, Left Foot Updated: 07/04/20 1254    Culture, Anaerobe [[id number]] Collected: 07/04/20 1212    Order Status: Sent Specimen: Toe, Left Foot Updated: 07/04/20 1253        Specimen (12h ago, onward)    None          Diagnostic Results:  I have reviewed all pertinent imaging results/findings within the past 24 hours.    Clinical Findings:  POD# 1 s/p left hallux amputation (DOS:7/4/2020). Surgical site c/d/i. Sutures intact, no signs of wound dehiscence, no drainage. Surgical margins appear slightly dusky and erythematous. Previous erythema to left foot and lower leg still remains but appears to be improving. Still with increased warmth to LLE. No fluctuance or crepitus.                       Assessment/Plan:     * Osteomyelitis of great toe of left foot  55 y.o M presents with worsening L great toe ulceration despite compliance with PO antibiotics (clindamycin/cipro). Previous bone biopsy of L great toe now growing E. faecalis. Pending susceptibilities. WBC: 12k, ESR 73, Procal, lactate WNL. Xray of left foot with scattered subcutaneous gas about the great toe concerning for worsening gas-forming infection/cellulitis and ulceration. No definite osseous erosion or periosteal reaction appreciated.     Plan:  POD#1 s/p L great toe amputation.  Intraop wound cx, clean margins NGTD. Will continue to follow.  Continue IV abx. Currently on Vanc.  Partial weightbearing to left heel only in post op DARCO shoe for short distances <10 feet  Will order LLE arterial US with ABIs given weakly palpable pulses and duskiness to surgical skin margins. If results are concerning, will consult vascular.    Podiatry will continue to follow    Rest of care per primary        Phoebe Murphy DPM  [facility]  Podiatry PGY2  Pager: [phone]

## 2020-07-05 NOTE — CONSULTS
"[facility]  Infectious Disease  Consult Note    Patient Name: Billy Sheffield Miguel  MRN: [id number]  Admission Date: 7/3/2020  Hospital Length of Stay: 2 days  Attending Physician: Cece Feliciano MD  Primary Care Provider: BRAD Baker MD     Isolation Status: No active isolations    Patient information was obtained from patient, past medical records and ER records.      Consults  Assessment/Plan:     * Osteomyelitis of great toe of left foot     55 year old with DM, HTN, CAD, history of colon CA s/p resection in 2017 who presented to ED with worsening left toe swelling and erythema. Infection present approximately two weeks prior to admission and had been on cipro and clindamycin. Seen by Podiatry as outpatient and bone biopsy on 6/29.  Developed worsening swelling and ascending erythema.  Imaging concerning for gas forming infection/cellulitis.  No definite osseous erosion.   Bone cultures from biopsy showing E faecalis - ampicillin S.   Underwent left great toe amputation on 7/4.  Clean margins pending.  Per operative note, there was no abscess, tracking or tunneling. Remaining first metatarsal head appeared healthy and shiny.  ID consulted for antibiotic recommendations.        Isolate is ampicillin sensitive, but patient has reported severe PCN allergy ("coma" when he was a child).        Patient afebrile, no leukocytosis.  Blood cultures NGTD.  Hemodynamically stable.  Per Podiatry note today, surgical site somewhat dusky.  ABIs pending.     Plan/recommendations:  1.  Continue IV vancomycin for now for prior e faecalis.  Pharmacy to dose. Trough goal 15-20  2.  Follow clean margin cultures and pathology.  If positive will need long term antibiotics.  If negative, will need two weeks oral abx.  Given reported severe PCN allergy, may be able to consider short course of linezolid.  Will confirm susceptibility with micro.   3.  Follow up ABIs/vascular evaluation   4. Recommend PCN allergy " testing as outpatient  5.  Will follow up tomorrow.     Data reviewed and plan discussed with ID staff            Thank you.   Please call for any questions or concerns.  ADALBERTO Finnegan, ANP-C  [phone] pager, [place] [phone]  Subjective:     Principal Problem: Osteomyelitis of great toe of left foot    HPI: Billy Rivera is a 55 year old with DM, HTN, CAD, history of colon CA s/p resection in 2017 who presented to ED with left toe swelling and erythema. Prior history 2018 of MSSA infection of right foot.  Patient developed left great toe wound two weeks prior after wearing new shoes that were too tight.  Seen by PCP and prescribed course of clindamycin.  An xray of the foot on 6/28 was without evidence of osteo or soft tissue emphysema.   He was then seen in Podiatry on 6/29 and underwent left nail avulsion and I&D of the left great toe.  Bone biopsy obtained and was prescribed ciprofloxacin to take with the clinda.  He had subsequent worsening swelling and redness of the foot and ascending erythema.  No associated fevers, chills.  He reports prior history of DM foot infections but no history of osteo.     In ED noted to have WBC 12, ESR 73, procal and lactate wnl.  Xray of foot showed scattered subcutaneous gas.  IV vancomycin was started.      Bone cultures showing E faecalis - susceptibilities pending.  He is undergoing left great toe amputation today.  ID is consulted for antibiotic recommendations.      Past Medical History:   Diagnosis Date    Allergy     CAD (coronary artery disease), native coronary artery 6/25/2013    Cancer     Diabetes mellitus     Diabetes mellitus, type 2     Disorder of kidney and ureter     Heart attack 04/2012    Hx of colon cancer, stage I     Hyperlipidemia     Hypertension     Muscular pain     post-op after colonoscopy    MICHAELA (obstructive sleep apnea)        Past Surgical History:   Procedure Laterality Date    COLONOSCOPY N/A 2/17/2017    Procedure: COLONOSCOPY;   Surgeon: Simon Gomez MD;  Location: [facility] ENDO (4TH FLR);  Service: Endoscopy;  Laterality: N/A;    CORONARY ANGIOPLASTY      INCISION AND DRAINAGE FOOT Right 6/5/2018    Procedure: INCISION AND DRAINAGE, FOOT;  Surgeon: Bridget Santana DPM;  Location: [facility] OR 1ST FLR;  Service: Podiatry;  Laterality: Right;  Request mini C arm in room. request wound vac with medium black sponge    INCISION AND DRAINAGE FOOT Right 6/7/2018    Procedure: INCISION AND DRAINAGE, FOOT;  Surgeon: Emelia Brock DPM;  Location: [facility] OR 2ND FLR;  Service: Podiatry;  Laterality: Right;    INCISION AND DRAINAGE OF ABSCESS Right 6/2/2018    Procedure: INCISION AND DRAINAGE, ABSCESS;  Surgeon: Bridget Santana DPM;  Location: [facility] OR 2ND FLR;  Service: General;  Laterality: Right;    REMOVAL OF FOREIGN BODY FROM FOOT Right 6/7/2018    Procedure: REMOVAL, FOREIGN BODY, FOOT;  Surgeon: Emelia Brock DPM;  Location: [facility] OR 2ND FLR;  Service: Podiatry;  Laterality: Right;    TONSILLECTOMY         Review of patient's allergies indicates:   Allergen Reactions    Penicillins Other (See Comments)     PCN allergy as a child - was told he went into a coma. Tolerates Cefazolin without adverse reactions    Shellfish containing products      Other reaction(s): Unknown    Bactrim  [sulfamethoxazole-trimethoprim] Rash       Medications:  Medications Prior to Admission   Medication Sig    aspirin (ECOTRIN) 81 MG EC tablet Take 1 tablet (81 mg total) by mouth once daily.    atorvastatin (LIPITOR) 80 MG tablet Take 1 tablet (80 mg total) by mouth once daily.    blood sugar diagnostic Strp Strips and lancets    Use before meals and bedtime    carvediloL (COREG) 12.5 MG tablet 1 tablet by mouth 2 (two) times daily.    ciprofloxacin HCl (CIPRO) 500 MG tablet Take 1 tablet (500 mg total) by mouth 2 (two) times daily.    clindamycin (CLEOCIN) 300 MG capsule Take 1 capsule (300 mg total) by mouth every 8 (eight) hours. for 7 days    ibuprofen  "(ADVIL,MOTRIN) 800 MG tablet Take 1 tablet (800 mg total) by mouth 3 (three) times daily as needed for Pain.    insulin (LANTUS SOLOSTAR U-100 INSULIN) glargine 100 units/mL (3mL) SubQ pen INJECT 55 UNITS SUBCUTANEOUSLY IN THE EVENING    Lactobacillus rhamnosus GG (CULTURELLE) 10 billion cell capsule Take 1 capsule by mouth once daily.    qombvgwpz-[id number]-algal oil (METANX/FOLTANX RF) 3 mg-35 mg-2 mg -90.314 mg Cap Take one po daily    lisinopriL (PRINIVIL,ZESTRIL) 40 MG tablet Take 1 tablet (40 mg total) by mouth once daily.    metFORMIN (GLUCOPHAGE) 1000 MG tablet Take 1 tablet (1,000 mg total) by mouth 2 (two) times daily with meals.    mupirocin (BACTROBAN) 2 % ointment Apply to affected area 3 times daily    pen needle, diabetic 31 gauge x 3/16" Ndle Inject 1 each into the skin 3 (three) times daily before meals. (Patient taking differently: Inject 1 each into the skin 4 (four) times daily. )     Antibiotics (From admission, onward)    Start     Stop Route Frequency Ordered    07/05/20 2030  vancomycin 1.75 g in 5 % dextrose 500 mL IVPB      -- IV Every 12 hours (non-standard times) 07/05/20 1016    07/03/20 2012  vancomycin - pharmacy to dose  (vancomycin IVPB)      -- IV pharmacy to manage frequency 07/03/20 1912        Antifungals (From admission, onward)    None        Antivirals (From admission, onward)    None           Immunization History   Administered Date(s) Administered    Influenza - Intradermal - Quadrivalent - PF 10/24/2013    Pneumococcal Polysaccharide - 23 Valent 06/29/2017    Tdap 09/08/2015       Family History     Problem Relation (Age of Onset)    Heart disease Mother, Brother    No Known Problems Father        Social History     Socioeconomic History    Marital status:      Spouse name: Not on file    Number of children: Not on file    Years of education: Not on file    Highest education level: Not on file   Occupational History    Not on file   Social Needs    " Financial resource strain: Not on file    Food insecurity     Worry: Not on file     Inability: Not on file    Transportation needs     Medical: Not on file     Non-medical: Not on file   Tobacco Use    Smoking status: Never Smoker    Smokeless tobacco: Never Used   Substance and Sexual Activity    Alcohol use: Yes     Comment: rare x1 beer-     Drug use: No    Sexual activity: Not Currently   Lifestyle    Physical activity     Days per week: Not on file     Minutes per session: Not on file    Stress: Not on file   Relationships    Social connections     Talks on phone: Not on file     Gets together: Not on file     Attends Rastafarian service: Not on file     Active member of club or organization: Not on file     Attends meetings of clubs or organizations: Not on file     Relationship status: Not on file   Other Topics Concern    Not on file   Social History Narrative    Not on file     Review of Systems   Constitutional: Negative for activity change, appetite change, chills, fatigue and fever.   HENT: Negative.    Eyes: Negative.    Respiratory: Negative for cough, shortness of breath and wheezing.    Cardiovascular: Positive for leg swelling. Negative for chest pain.   Gastrointestinal: Negative for abdominal pain, diarrhea, nausea and vomiting.   Genitourinary: Negative for dysuria.   Musculoskeletal: Positive for arthralgias. Negative for myalgias.   Skin: Positive for color change and wound.   Neurological: Negative for dizziness and numbness.   Psychiatric/Behavioral: Negative for agitation and confusion.     Objective:     Vital Signs (Most Recent):  Temp: 99.3 °F (37.4 °C) (07/05/20 1458)  Pulse: 65 (07/05/20 1458)  Resp: 18 (07/05/20 1458)  BP: (!) 140/82 (07/05/20 1458)  SpO2: 95 % (07/05/20 1458) Vital Signs (24h Range):  Temp:  [96.2 °F (35.7 °C)-100 °F (37.8 °C)] 99.3 °F (37.4 °C)  Pulse:  [60-71] 65  Resp:  [14-18] 18  SpO2:  [92 %-96 %] 95 %  BP: ()/(51-86) 140/82     Weight: 127 kg  (280 lb)  Body mass index is 35.95 kg/m².    Estimated Creatinine Clearance: 116.8 mL/min (based on SCr of 1 mg/dL).    Physical Exam  Vitals signs and nursing note reviewed.   Constitutional:       General: He is not in acute distress.     Appearance: He is not ill-appearing, toxic-appearing or diaphoretic.   HENT:      Head: Normocephalic and atraumatic.   Eyes:      General: No scleral icterus.     Conjunctiva/sclera: Conjunctivae normal.   Neck:      Musculoskeletal: Normal range of motion.   Cardiovascular:      Rate and Rhythm: Normal rate and regular rhythm.   Pulmonary:      Effort: Pulmonary effort is normal.      Breath sounds: Normal breath sounds.   Abdominal:      Palpations: Abdomen is soft.   Musculoskeletal:         General: Swelling present.      Comments: Left foot with surgical dressing in place.  C/d/i.    Ascending erythema and warmth noted above dressing into ankle.   Podiatry photos of today reviewed. See below.    Skin:     General: Skin is warm and dry.   Neurological:      Mental Status: He is alert.   Psychiatric:         Mood and Affect: Mood normal.         Behavior: Behavior normal.       Post op      Pre-op        Significant Labs:   Blood Culture:   Recent Labs   Lab 07/03/20  1450 07/03/20  1541 07/03/20  2020   LABBLOO No Growth to date  No Growth to date No Growth to date  No Growth to date No Growth to date  No Growth to date  No Growth to date  No Growth to date     CBC:   Recent Labs   Lab 07/04/20  0324 07/05/20  0427   WBC 11.23 9.92   HGB 11.9* 12.2*   HCT 35.5* 36.1*    299     CMP:   Recent Labs   Lab 07/04/20  0324 07/05/20  0427   * 132*   K 4.3 4.4   CL 98 97   CO2 23 26   * 322*   BUN 17 18   CREATININE 1.0 1.0   CALCIUM 8.3* 8.7   PROT 6.4  --    ALBUMIN 2.3*  --    BILITOT 0.5  --    ALKPHOS 108  --    AST 15  --    ALT 16  --    ANIONGAP 10 9   EGFRNONAA >60.0 >60.0     Wound Culture:   Recent Labs   Lab 06/29/20  1308 07/03/20  1540  07/04/20  1212   LABAERO ENTEROCOCCUS FAECALIS  Many  * No significant isolate No growth  No growth       Significant Imaging: I have reviewed all pertinent imaging results/findings within the past 24 hours.

## 2020-07-05 NOTE — HOSPITAL COURSE
Mr. Rivera presented with a worsening left great open wound despite being on day 4 of antibiotics. He stated he had no pain on toe, but drainage and leg redness was worsening. Denied fevers or any other symptoms. Podiatry was consulted and amputated his toe with clean margins on 7/4/20. Pt tolerated procedure well. During stay his glucose has been above 300s, we have been  adjusting his insulin to get better control. On 7/6/2020 patient received 40 units of short acting insulin by error. BG have been monitored q 1 hour and his readings are still > 300s.   ID has been consulted for co-management of infection. Vancomycin switched to oral linezolid for 7 days. Patient will be discharged today with Endocrinology, ID and podiatry follow ups within 10 days

## 2020-07-05 NOTE — PROGRESS NOTES
[facility] Hospital Medicine  Progress Note    Patient Name: Billy Rivera  MRN: [id number]  Patient Class: IP- Inpatient   Admission Date: 7/3/2020  Length of Stay: 2 days  Attending Physician: Cece Feliciano MD  Primary Care Provider: BRAD Baker MD    [facility] Medicine Team: [facility] 5 Frances Sow MD    Subjective:     Principal Problem:Osteomyelitis of great toe of left foot        HPI:  55-year-old male with DM type 2, HLD, HTN presents to the ED for evaluation of toe infection. Patient reports increased swelling in left lower extremity and redness spreading further up left foot and lower leg. Patient had a bone biopsy obtained by Podiatry on 7/4/20. He was started on Cipro at that time, currently on IV Vancomycin drip. Culture is growing Enterococcus faecalis. Sensitivities are pending. Patient denies fever, chills, night sweats, dyspnea, changes in bowel or bladder habits. Reports feeling fatigued and nauseous recently. Also reports poor appetite for the past week with episodes of vomiting he ascribes to antibiotic regimen.          Overview/Hospital Course:  Mr. Rivera presented with a worsening left great open wound despite being on day 4 of antibiotics. He stated he had no pain on toe, but drainage and leg redness was worsening. Denied fevers or any other symptoms. Podiatry was consulted and amputated his toe with clean margins on 7/4/20. Pt tolerated procedure well. During stay his glucose has been above 200, we are adjusting his insulin to get better control. ID has been consulted for management of infection.     Interval History: No acute events overnight. Pt is laying comfortably in bed. Denies any pain, fevers, chills, no trouble urinating, last bowel movement was 2 days ago.    Review of Systems   Constitutional: Negative for activity change, fatigue and fever.   Respiratory: Negative for cough and shortness of breath.    Cardiovascular: Negative for chest pain.    Gastrointestinal: Negative for abdominal pain.   Genitourinary: Negative for dysuria.   Musculoskeletal: Negative for myalgias.   Skin: Positive for color change and rash.   Neurological: Negative for dizziness and numbness.   Psychiatric/Behavioral: Negative for agitation and confusion.     Objective:     Vital Signs (Most Recent):  Temp: 98.9 °F (37.2 °C) (07/05/20 1150)  Pulse: 60 (07/05/20 1150)  Resp: 18 (07/05/20 1150)  BP: 128/74 (07/05/20 1150)  SpO2: 96 % (07/05/20 1150) Vital Signs (24h Range):  Temp:  [96 °F (35.6 °C)-100 °F (37.8 °C)] 98.9 °F (37.2 °C)  Pulse:  [58-71] 60  Resp:  [14-18] 18  SpO2:  [92 %-97 %] 96 %  BP: ()/(51-86) 128/74     Weight: 127 kg (280 lb)  Body mass index is 35.95 kg/m².    Intake/Output Summary (Last 24 hours) at 7/5/2020 1259  Last data filed at 7/4/2020 2200  Gross per 24 hour   Intake 830 ml   Output 700 ml   Net 130 ml      Physical Exam  Constitutional:       General: He is not in acute distress.  HENT:      Head: Normocephalic and atraumatic.   Cardiovascular:      Rate and Rhythm: Normal rate and regular rhythm.      Pulses: Normal pulses.      Heart sounds: Normal heart sounds. No murmur. No friction rub. No gallop.    Pulmonary:      Effort: Pulmonary effort is normal.      Breath sounds: Normal breath sounds. No wheezing, rhonchi or rales.   Abdominal:      General: Abdomen is flat. Bowel sounds are normal. There is no distension.      Palpations: Abdomen is soft.      Tenderness: There is no abdominal tenderness. There is no guarding.   Musculoskeletal: Normal range of motion.         General: No tenderness.   Skin:     General: Skin is warm.      Findings: Erythema present.      Comments: Left foot covered with dressing. Erythema on left lower leg is decreasing.   Neurological:      General: No focal deficit present.      Mental Status: He is alert and oriented to person, place, and time.   Psychiatric:         Mood and Affect: Mood normal.          Significant Labs: All pertinent labs within the past 24 hours have been reviewed.    Significant Imaging: I have reviewed all pertinent imaging results/findings within the past 24 hours.      Assessment/Plan:      * Osteomyelitis of great toe of left foot  Left 1st  toe open wound with drainage, xray shows skin irregularity with scattered subcutaneous gas about the great toe. Bone biopsy performed by podiatry on 6/29/20, growing enterococcus faecalis. Pt was taking Cipro since 6/29. 1st toe amputation on 7/4/20  -WBC 11.23, trending down, continue monitoring  -Podiatry following, recommended partial weightbearing to left heel only in post op DARCO shoe for short distances <10 feet. Ordered LLE arterial US with ABIs given weakly palpable pulses and duskiness to surgical skin margins. If results are concerning, will consult vascular.   - Consulted ID for management  - On vanc, day 3        Diabetes mellitus with insulin therapy  Hgb A1C 13.1  - Increased: Levemir 48 units, aspart 6 units with meals  - Will monitor glucose and make adjustments as needed      HTN (hypertension)  -Continue home meds: carvedilol 12.5 and lisinopril 40mg      Coronary artery disease involving native coronary artery of native heart without angina pectoris  -Continue home ASA      Discharge planning issues  PT/OT eval for post surgery for rehab  F/U with PCP for diabetes manangement    Dyslipidemia  - Continue home atorvastatin        VTE Risk Mitigation (From admission, onward)         Ordered     enoxaparin injection 40 mg  Every 24 hours      07/03/20 1929     IP VTE HIGH RISK PATIENT  Once      07/03/20 1929     Place sequential compression device  Until discontinued      07/03/20 1918                    Frances Sow DO  PGY-1 Internal Medicine  Department of Hospital Medicine   [facility]

## 2020-07-05 NOTE — SUBJECTIVE & OBJECTIVE
Interval History: No acute events overnight. Pt is laying comfortably in bed. Denies any pain, fevers, chills, no trouble urinating, last bowel movement was 2 days ago.    Review of Systems   Constitutional: Negative for activity change, fatigue and fever.   Respiratory: Negative for cough and shortness of breath.    Cardiovascular: Negative for chest pain.   Gastrointestinal: Negative for abdominal pain.   Genitourinary: Negative for dysuria.   Musculoskeletal: Negative for myalgias.   Skin: Positive for color change and rash.   Neurological: Negative for dizziness and numbness.   Psychiatric/Behavioral: Negative for agitation and confusion.     Objective:     Vital Signs (Most Recent):  Temp: 98.9 °F (37.2 °C) (07/05/20 1150)  Pulse: 60 (07/05/20 1150)  Resp: 18 (07/05/20 1150)  BP: 128/74 (07/05/20 1150)  SpO2: 96 % (07/05/20 1150) Vital Signs (24h Range):  Temp:  [96 °F (35.6 °C)-100 °F (37.8 °C)] 98.9 °F (37.2 °C)  Pulse:  [58-71] 60  Resp:  [14-18] 18  SpO2:  [92 %-97 %] 96 %  BP: ()/(51-86) 128/74     Weight: 127 kg (280 lb)  Body mass index is 35.95 kg/m².    Intake/Output Summary (Last 24 hours) at 7/5/2020 1259  Last data filed at 7/4/2020 2200  Gross per 24 hour   Intake 830 ml   Output 700 ml   Net 130 ml      Physical Exam  Constitutional:       General: He is not in acute distress.  HENT:      Head: Normocephalic and atraumatic.   Cardiovascular:      Rate and Rhythm: Normal rate and regular rhythm.      Pulses: Normal pulses.      Heart sounds: Normal heart sounds. No murmur. No friction rub. No gallop.    Pulmonary:      Effort: Pulmonary effort is normal.      Breath sounds: Normal breath sounds. No wheezing, rhonchi or rales.   Abdominal:      General: Abdomen is flat. Bowel sounds are normal. There is no distension.      Palpations: Abdomen is soft.      Tenderness: There is no abdominal tenderness. There is no guarding.   Musculoskeletal: Normal range of motion.         General: No  tenderness.   Skin:     General: Skin is warm.      Findings: Erythema present.      Comments: Left foot covered with dressing. Erythema on left lower leg is decreasing.   Neurological:      General: No focal deficit present.      Mental Status: He is alert and oriented to person, place, and time.   Psychiatric:         Mood and Affect: Mood normal.         Significant Labs: All pertinent labs within the past 24 hours have been reviewed.    Significant Imaging: I have reviewed all pertinent imaging results/findings within the past 24 hours.

## 2020-07-05 NOTE — PLAN OF CARE
Problem: Physical Therapy Goal  Goal: Physical Therapy Goal  Description: Goals to be met by: 20    Patient will increase functional independence with mobility by performin. Supine to sit with Set-up [place]  2. Sit to supine with Set-up [place]  3. Sit to stand transfer with Supervision  4. Gait  x 150 feet with Supervision using Rolling Walker.  5.  Patient will demonstrate independence with a home exercise program  6. Patient's balance will be GOOD: Needs SUPERVISION only during gait and able to self right with moderate LOB.  7. Ascend/descend 3 stair with bilateral Handrails Contact Guard Assistance.     Outcome: Ongoing, Progressing     PT evaluation completed, goals established and plan of care reviewed with patient.  Patient demonstrates good motivation and good activity tolerance secondary to appropriate pain control.  Patient demonstrated decreased safety awareness and impulsiveness limiting overall independence.  Patient required between SBA and CGA for bed mobility, transfers and ambulation.  Patient ambulated 40 ft with rolling walker, CGA and appropriate maintenance of NWB status.  Patient will benefit from skilled PT for strengthening and mobility training to increase independence and home safety.      Luis Espinoza, PT, DPT  2020  Pager #: [phone]

## 2020-07-05 NOTE — PROGRESS NOTES
Pharmacokinetic Assessment Follow Up: IV Vancomycin    Vancomycin serum concentration assessment(s):  · Vancomycin trough concentration this AM drawn early resulted at 10.7 mcg/mL  · RF stable/improving  · Goal trough 15-20 mcg/mL for BJI    Vancomycin Regimen Plan:  1.  Vancomycin 1750 mg IV Q12H  2.  Obtain trough prior to fourth dose of new regimen:  7/7 @ 0800  3.  Continue to monitor RF and make adjustments as needed    Drug levels (last 3 results):  Recent Labs   Lab Result Units 07/05/20  0427   Vancomycin, Random ug/mL 10.7       Pharmacy will continue to follow and monitor vancomycin.    Thank you for the consult,   Ariadne Lara, PharmD, [facility]  [id number]       Patient brief summary:  Billy Rivera is a 55 y.o. male initiated on antimicrobial therapy with IV Vancomycin for treatment of bone/joint infection      Drug Allergies:   Review of patient's allergies indicates:   Allergen Reactions    Penicillins Other (See Comments)     PCN allergy as a child - was told he went into a coma. Tolerates Cefazolin without adverse reactions    Shellfish containing products      Other reaction(s): Unknown    Bactrim  [sulfamethoxazole-trimethoprim] Rash       CBC (last 72 hours):  Recent Labs   Lab Result Units 07/03/20  1530 07/03/20 2020 07/04/20  0324 07/05/20  0427   WBC K/uL 12.93*  --  11.23 9.92   Hemoglobin g/dL 13.6*  --  11.9* 12.2*   Hemoglobin A1C %  --  11.8*  --   --    Hematocrit % 41.0  --  35.5* 36.1*   Platelets K/uL 284  --  265 299   Gran% % 77.7*  --  70.5 71.3   Lymph% % 9.6*  --  15.8* 13.5*   Mono% % 11.3  --  11.3 11.1   Eosinophil% % 0.2  --  1.0 2.1   Basophil% % 0.2  --  0.4 0.5   Differential Method  Automated  --  Automated Automated       Metabolic Panel (last 72 hours):  Recent Labs   Lab Result Units 07/03/20  1530 07/04/20  0324 07/05/20  0427   Sodium mmol/L 131* 131* 132*   Potassium mmol/L 4.6 4.3 4.4   Chloride mmol/L 97 98 97   CO2 mmol/L 23 23 26   Glucose mg/dL 407* 346*  322*   BUN, Bld mg/dL 17 17 18   Creatinine mg/dL 1.2 1.0 1.0   Albumin g/dL 2.7* 2.3*  --    Total Bilirubin mg/dL 0.8 0.5  --    Alkaline Phosphatase U/L 130 108  --    AST U/L 16 15  --    ALT U/L 18 16  --    Magnesium mg/dL  --  1.7 1.8   Phosphorus mg/dL  --  3.7 3.9       Vancomycin Administrations:  vancomycin given in the last 96 hours                   vancomycin 1.5 g in dextrose 5 % 250 mL IVPB (ready to mix) (mg) 1,500 mg New Bag 07/05/20 0820     1,500 mg New Bag 07/04/20 1854     1,500 mg New Bag  0610    vancomycin (VANCOCIN) 2,500 mg in dextrose 5 % 500 mL IVPB (mg) 2,500 mg New Bag 07/03/20 1710                Microbiologic Results:  Microbiology Results (last 7 days)     Procedure Component Value Units Date/Time    Aerobic culture [[id number]] Collected: 07/04/20 1212    Order Status: Completed Specimen: Skin from Toe, Left Foot Updated: 07/05/20 0808     Aerobic Bacterial Culture No growth    Narrative:      Left great toe    Aerobic culture [[id number]] Collected: 07/04/20 1212    Order Status: Completed Specimen: Body Fluid from Toe, Left Foot Updated: 07/05/20 0807     Aerobic Bacterial Culture No growth    Narrative:      Left great toe swab    Aerobic culture [[id number]] Collected: 07/03/20 1540    Order Status: Completed Specimen: Wound from Toe, Left Foot Updated: 07/05/20 0737     Aerobic Bacterial Culture No significant isolate    Blood culture (site 1) [[id number]] Collected: 07/03/20 2020    Order Status: Completed Specimen: Blood Updated: 07/04/20 2212     Blood Culture, Routine No Growth to date      No Growth to date    Narrative:      Site # 1, aerobic and anaerobic    Blood culture (site 2) [[id number]] Collected: 07/03/20 2020    Order Status: Completed Specimen: Blood Updated: 07/04/20 2212     Blood Culture, Routine No Growth to date      No Growth to date    Narrative:      Site # 2, aerobic only    Blood culture x two cultures. Draw prior to antibiotics. [[id number]] Collected:  07/03/20 1541    Order Status: Completed Specimen: Blood from Peripheral, Antecubital, Right Updated: 07/04/20 1812     Blood Culture, Routine No Growth to date      No Growth to date    Narrative:      Aerobic and anaerobic    Blood culture x two cultures. Draw prior to antibiotics. [[id number]] Collected: 07/03/20 1450    Order Status: Completed Specimen: Blood from Peripheral, Forearm, Left Updated: 07/04/20 1812     Blood Culture, Routine No Growth to date      No Growth to date    Narrative:      Aerobic and anaerobic    Gram stain [[id number]] Collected: 07/04/20 1212    Order Status: Completed Specimen: Tissue from Toe, Left Foot Updated: 07/04/20 1337     Gram Stain Result No WBC's      No organisms seen    Narrative:      Left great toe    Culture, Anaerobe [[id number]] Collected: 07/04/20 1212    Order Status: Sent Specimen: Tissue from Toe, Left Foot Updated: 07/04/20 1256    Fungus culture [[id number]] Collected: 07/04/20 1212    Order Status: Sent Specimen: Tissue from Toe, Left Foot Updated: 07/04/20 1255    AFB Culture & Smear [[id number]] Collected: 07/04/20 1212    Order Status: Sent Specimen: Tissue from Toe, Left Foot Updated: 07/04/20 1254    Culture, Anaerobe [[id number]] Collected: 07/04/20 1212    Order Status: Sent Specimen: Toe, Left Foot Updated: 07/04/20 1253

## 2020-07-05 NOTE — ASSESSMENT & PLAN NOTE
Left 1st  toe open wound with drainage, xray shows skin irregularity with scattered subcutaneous gas about the great toe. Bone biopsy performed by podiatry on 6/29/20, growing enterococcus faecalis. Pt was taking Cipro since 6/29. 1st toe amputation on 7/4/20  -WBC 11.23, trending down, continue monitoring  -Podiatry following, recommended partial weightbearing to left heel only in post op DARCO shoe for short distances <10 feet. Ordered LLE arterial US with ABIs given weakly palpable pulses and duskiness to surgical skin margins. If results are concerning, will consult vascular.   - Consulted ID for management  - On vanc, day 3

## 2020-07-05 NOTE — HPI
Billy Rivera is a 55 year old with DM, HTN, CAD, history of colon CA s/p resection in 2017 who presented to ED with left toe swelling and erythema. Prior history 2018 of MSSA infection of right foot.  Patient developed left great toe wound two weeks prior after wearing new shoes that were too tight.  Seen by PCP and prescribed course of clindamycin.  An xray of the foot on 6/28 was without evidence of osteo or soft tissue emphysema.   He was then seen in Podiatry on 6/29 and underwent left nail avulsion and I&D of the left great toe.  Bone biopsy obtained and was prescribed ciprofloxacin to take with the clinda.  He had subsequent worsening swelling and redness of the foot and ascending erythema.  No associated fevers, chills.  He reports prior history of DM foot infections but no history of osteo.     In ED noted to have WBC 12, ESR 73, procal and lactate wnl.  Xray of foot showed scattered subcutaneous gas.  IV vancomycin was started.      Bone cultures showing E faecalis - susceptibilities pending.  He is undergoing left great toe amputation today.  ID is consulted for antibiotic recommendations.

## 2020-07-06 VITALS
RESPIRATION RATE: 17 BRPM | OXYGEN SATURATION: 96 % | BODY MASS INDEX: 35.94 KG/M2 | HEIGHT: 74 IN | TEMPERATURE: 98 F | WEIGHT: 280 LBS | SYSTOLIC BLOOD PRESSURE: 145 MMHG | DIASTOLIC BLOOD PRESSURE: 69 MMHG | HEART RATE: 64 BPM

## 2020-07-06 LAB
BACTERIA SPEC ANAEROBE CULT: ABNORMAL
BACTERIA SPEC ANAEROBE CULT: ABNORMAL
POCT GLUCOSE: 241 MG/DL (ref 70–110)
POCT GLUCOSE: 276 MG/DL (ref 70–110)
POCT GLUCOSE: 297 MG/DL (ref 70–110)
POCT GLUCOSE: 302 MG/DL (ref 70–110)
POCT GLUCOSE: 304 MG/DL (ref 70–110)
POCT GLUCOSE: 310 MG/DL (ref 70–110)
POCT GLUCOSE: 312 MG/DL (ref 70–110)
POCT GLUCOSE: 314 MG/DL (ref 70–110)
POCT GLUCOSE: 320 MG/DL (ref 70–110)
POCT GLUCOSE: 338 MG/DL (ref 70–110)

## 2020-07-06 PROCEDURE — 99239 HOSP IP/OBS DSCHRG MGMT >30: CPT | Mod: ,,, | Performed by: HOSPITALIST

## 2020-07-06 PROCEDURE — 94761 N-INVAS EAR/PLS OXIMETRY MLT: CPT

## 2020-07-06 PROCEDURE — 99239 PR HOSPITAL DISCHARGE DAY,>30 MIN: ICD-10-PCS | Mod: ,,, | Performed by: HOSPITALIST

## 2020-07-06 PROCEDURE — 25000003 PHARM REV CODE 250: Performed by: STUDENT IN AN ORGANIZED HEALTH CARE EDUCATION/TRAINING PROGRAM

## 2020-07-06 PROCEDURE — 99900035 HC TECH TIME PER 15 MIN (STAT)

## 2020-07-06 PROCEDURE — 99024 POSTOP FOLLOW-UP VISIT: CPT | Mod: ,,, | Performed by: STUDENT IN AN ORGANIZED HEALTH CARE EDUCATION/TRAINING PROGRAM

## 2020-07-06 PROCEDURE — 99233 SBSQ HOSP IP/OBS HIGH 50: CPT | Mod: ,,, | Performed by: PHYSICIAN ASSISTANT

## 2020-07-06 PROCEDURE — 97116 GAIT TRAINING THERAPY: CPT

## 2020-07-06 PROCEDURE — 63600175 PHARM REV CODE 636 W HCPCS: Performed by: STUDENT IN AN ORGANIZED HEALTH CARE EDUCATION/TRAINING PROGRAM

## 2020-07-06 PROCEDURE — 99024 PR POST-OP FOLLOW-UP VISIT: ICD-10-PCS | Mod: ,,, | Performed by: STUDENT IN AN ORGANIZED HEALTH CARE EDUCATION/TRAINING PROGRAM

## 2020-07-06 PROCEDURE — 99233 PR SUBSEQUENT HOSPITAL CARE,LEVL III: ICD-10-PCS | Mod: ,,, | Performed by: PHYSICIAN ASSISTANT

## 2020-07-06 RX ORDER — INSULIN GLARGINE 100 [IU]/ML
55 INJECTION, SOLUTION SUBCUTANEOUS NIGHTLY
Qty: 20 ML | Refills: 11 | Status: ON HOLD | OUTPATIENT
Start: 2020-07-06 | End: 2020-10-15 | Stop reason: SDUPTHER

## 2020-07-06 RX ORDER — LINEZOLID 600 MG/1
600 TABLET, FILM COATED ORAL EVERY 12 HOURS
Qty: 14 TABLET | Refills: 0 | Status: SHIPPED | OUTPATIENT
Start: 2020-07-06 | End: 2020-07-13

## 2020-07-06 RX ORDER — CARVEDILOL 12.5 MG/1
12.5 TABLET ORAL 2 TIMES DAILY
Qty: 60 TABLET | Refills: 11 | Status: ON HOLD | OUTPATIENT
Start: 2020-07-06 | End: 2020-10-15 | Stop reason: SDUPTHER

## 2020-07-06 RX ADMIN — INSULIN ASPART 8 UNITS: 100 INJECTION, SOLUTION INTRAVENOUS; SUBCUTANEOUS at 05:07

## 2020-07-06 RX ADMIN — LISINOPRIL 40 MG: 20 TABLET ORAL at 09:07

## 2020-07-06 RX ADMIN — INSULIN ASPART 8 UNITS: 100 INJECTION, SOLUTION INTRAVENOUS; SUBCUTANEOUS at 12:07

## 2020-07-06 RX ADMIN — INSULIN ASPART 4 UNITS: 100 INJECTION, SOLUTION INTRAVENOUS; SUBCUTANEOUS at 05:07

## 2020-07-06 RX ADMIN — CARVEDILOL 12.5 MG: 12.5 TABLET, FILM COATED ORAL at 09:07

## 2020-07-06 RX ADMIN — ATORVASTATIN CALCIUM 80 MG: 20 TABLET, FILM COATED ORAL at 09:07

## 2020-07-06 RX ADMIN — INSULIN ASPART 8 UNITS: 100 INJECTION, SOLUTION INTRAVENOUS; SUBCUTANEOUS at 06:07

## 2020-07-06 RX ADMIN — INSULIN ASPART 4 UNITS: 100 INJECTION, SOLUTION INTRAVENOUS; SUBCUTANEOUS at 12:07

## 2020-07-06 RX ADMIN — ASPIRIN 81 MG: 81 TABLET, COATED ORAL at 09:07

## 2020-07-06 RX ADMIN — ENOXAPARIN SODIUM 40 MG: 40 INJECTION SUBCUTANEOUS at 05:07

## 2020-07-06 RX ADMIN — Medication 1 CAPSULE: at 09:07

## 2020-07-06 NOTE — ASSESSMENT & PLAN NOTE
Left 1st  toe open wound with drainage, xray shows skin irregularity with scattered subcutaneous gas about the great toe. Bone biopsy performed by podiatry on 6/29/20, growing enterococcus faecalis. Pt was taking Cipro since 6/29. 1st toe amputation on 7/4/20  -WBC 11.23, trending down, continue monitoring  -Podiatry following, recommended partial weightbearing to left heel only in post op DARCO shoe for short distances <10 feet. Ordered LLE arterial US with ABIs given weakly palpable pulses and duskiness to surgical skin margins. If results are concerning, will consult vascular.   - Consulted ID for co-management; recommends switching antibiotics from vanc to linezolid for 7 days with close outpatient follow up

## 2020-07-06 NOTE — ASSESSMENT & PLAN NOTE
"   55 year old with DM, HTN, CAD, history of colon CA s/p resection in 2017 who presented to ED with worsening left toe swelling and erythema. Infection present approximately two weeks prior to admission and had been on cipro and clindamycin. Seen by Podiatry as outpatient and bone biopsy on 6/29.  Developed worsening swelling and ascending erythema.  Imaging concerning for gas forming infection/cellulitis.  No definite osseous erosion.   Bone cultures from biopsy showing E faecalis - ampicillin S.   Underwent left great toe amputation on 7/4.  Clean margins NGTD.  Per operative note, there was no abscess, tracking or tunneling. Remaining first metatarsal head appeared healthy and shiny.  ID consulted for antibiotic recommendations.        Isolate is ampicillin sensitive, but patient has reported severe PCN allergy ("coma" when he was a child).       Plan/recommendations:  1.  Continue IV vancomycin for now for prior e faecalis.  Pharmacy to dose. Trough goal 15-20  2.  Follow clean margin cultures and pathology.  If positive will need long term antibiotics.  If negative, will need two weeks oral abx.  Given reported severe PCN allergy, will do linezolid x 1 week with close f/u with ID.   4. Recommend PCN allergy testing as outpatient  5.  ID will sign off. Please call if with questions    Data reviewed and plan discussed with ID staff      "

## 2020-07-06 NOTE — PLAN OF CARE
07/06/20 1257   Post-Acute Status   Post-Acute Authorization Home Health   Post-Acute Placement Status Referrals Sent   Home Health Status Awaiting Orders for HH   SW following patient for post acute care needs. Patient received therapy recs for HH for post acute care. SW sent HH referrals to the following providers in network with patient payor: [facility], [facility], [facility], [facility], [facility], [facility], and [facility]. Pending HH orders. SW will continue to follow.  Zoila Almazan, GILMA  [facility]

## 2020-07-06 NOTE — NURSING
"Patient ambulating to snack machine . Nurse offered arik ortiz. Patient stated, " you know I really don't know what I want . PT said If I take my time in hallway . I can walk . I'm going to walk to snack machine. "  "

## 2020-07-06 NOTE — CONSULTS
Wound care consult received for L foot diabetic wound however, pt has already been seen and evaluated by Podiatry who plans to continue to follow pt with wound care orders in place.  Wound care team to sign off. Please reconsult for any other needs. [phone]

## 2020-07-06 NOTE — SUBJECTIVE & OBJECTIVE
Subjective:     Interval History: NAEON. Pain denies any pedal pain. Intraop margins NGTD. Remains afebrile, hemodynamically stable. There was serosanguinous strikethrough to LLE dressings, patient reports walking barefoot only to go to the restroom. Patient denies N/V/F/C    Follow-up For: Procedure(s) (LRB):  AMPUTATION, TOE (Left)    Post-Operative Day: 2 Days Post-Op    Scheduled Meds:   aspirin  81 mg Oral Daily    atorvastatin  80 mg Oral Daily    carvediloL  12.5 mg Oral BID    enoxaparin  40 mg Subcutaneous Q24H    insulin aspart U-100  8 Units Subcutaneous TIDWM    insulin detemir U-100  48 Units Subcutaneous Daily    Lactobacillus rhamnosus GG  1 capsule Oral Daily    lisinopriL  40 mg Oral Daily    vancomycin (VANCOCIN) IVPB  1,750 mg Intravenous Q12H     Continuous Infusions:  PRN Meds:acetaminophen, albuterol-ipratropium, dextrose 50%, dextrose 50%, glucagon (human recombinant), glucose, glucose, insulin aspart U-100, ondansetron, ondansetron, promethazine, promethazine, sodium chloride 0.9%, sodium chloride 0.9%, Pharmacy to dose Vancomycin consult **AND** vancomycin - pharmacy to dose    Review of Systems   Constitutional: Positive for activity change. Negative for appetite change, chills, fatigue and fever.   Respiratory: Negative for cough and shortness of breath.    Cardiovascular: Positive for leg swelling.   Gastrointestinal: Negative for nausea and vomiting.   Musculoskeletal: Negative for arthralgias and myalgias.   Skin: Positive for color change and wound.   Neurological: Positive for numbness. Negative for weakness.   Hematological: Negative for adenopathy.   Psychiatric/Behavioral: Negative for confusion.     Objective:     Vital Signs (Most Recent):  Temp: 97.1 °F (36.2 °C) (07/06/20 0758)  Pulse: 60 (07/06/20 0758)  Resp: 17 (07/06/20 0758)  BP: (!) 154/83 (07/06/20 0758)  SpO2: 95 % (07/06/20 0758) Vital Signs (24h Range):  Temp:  [97.1 °F (36.2 °C)-99.3 °F (37.4 °C)] 97.1 °F  (36.2 °C)  Pulse:  [60-67] 60  Resp:  [16-18] 17  SpO2:  [95 %-97 %] 95 %  BP: (123-154)/(60-83) 154/83     Weight: 127 kg (280 lb)  Body mass index is 35.95 kg/m².    Foot Exam    Right Foot/Ankle     Inspection and Palpation  Ecchymosis: none  Tenderness: none   Skin Exam: callus; (R great toe callus)    Neurovascular  Dorsalis pedis: 1+  Posterior tibial: 1+  Saphenous nerve sensation: diminished  Tibial nerve sensation: diminished  Superficial peroneal nerve sensation: diminished  Deep peroneal nerve sensation: diminished  Sural nerve sensation: diminished      Left Foot/Ankle      Inspection and Palpation  Ecchymosis: none  Tenderness: none   Swelling: (+2 pitting edema to LLE)  Skin Exam: cellulitis, ulcer and erythema; (s/p left hallux amp. Erythema from surgical site extending to mid lower leg)    Neurovascular  Dorsalis pedis: 1+  Posterior tibial: 1+  Saphenous nerve sensation: diminished  Tibial nerve sensation: diminished  Superficial peroneal nerve sensation: diminished  Deep peroneal nerve sensation: diminished  Sural nerve sensation: diminished            Laboratory:  BMP:   Recent Labs   Lab 07/05/20  0427   *   *   K 4.4   CL 97   CO2 26   BUN 18   CREATININE 1.0   CALCIUM 8.7   MG 1.8     CBC:   Recent Labs   Lab 07/05/20 0427   WBC 9.92   RBC 4.23*   HGB 12.2*   HCT 36.1*      MCV 85   MCH 28.8   MCHC 33.8     CRP: No results for input(s): CRP in the last 168 hours.  ESR:   Recent Labs   Lab 07/05/20  0427   SEDRATE 103*     Wound Cultures:   Recent Labs   Lab 06/29/20  1308 07/03/20  1540 07/04/20  1212   LABAERO ENTEROCOCCUS FAECALIS  Many  * No significant isolate No growth  No growth     Microbiology Results (last 7 days)     Procedure Component Value Units Date/Time    Culture, Anaerobe [[id number]] Collected: 07/04/20 1212    Order Status: Completed Specimen: Toe, Left Foot Updated: 07/06/20 0722     Anaerobic Culture Culture in progress    Narrative:      Left great toe  swab    Culture, Anaerobe [[id number]] Collected: 07/04/20 1212    Order Status: Completed Specimen: Tissue from Toe, Left Foot Updated: 07/06/20 0722     Anaerobic Culture Culture in progress    Narrative:      Left great toe    Blood culture (site 1) [[id number]] Collected: 07/03/20 2020    Order Status: Completed Specimen: Blood Updated: 07/05/20 2212     Blood Culture, Routine No Growth to date      No Growth to date      No Growth to date    Narrative:      Site # 1, aerobic and anaerobic    Blood culture (site 2) [[id number]] Collected: 07/03/20 2020    Order Status: Completed Specimen: Blood Updated: 07/05/20 2212     Blood Culture, Routine No Growth to date      No Growth to date      No Growth to date    Narrative:      Site # 2, aerobic only    AFB Culture & Smear [[id number]] Collected: 07/04/20 1212    Order Status: Completed Specimen: Tissue from Toe, Left Foot Updated: 07/05/20 2127     AFB Culture & Smear Culture in progress    Narrative:      Left great toe    Blood culture x two cultures. Draw prior to antibiotics. [[id number]] Collected: 07/03/20 1541    Order Status: Completed Specimen: Blood from Peripheral, Antecubital, Right Updated: 07/05/20 1812     Blood Culture, Routine No Growth to date      No Growth to date      No Growth to date    Narrative:      Aerobic and anaerobic    Blood culture x two cultures. Draw prior to antibiotics. [[id number]] Collected: 07/03/20 1450    Order Status: Completed Specimen: Blood from Peripheral, Forearm, Left Updated: 07/05/20 1812     Blood Culture, Routine No Growth to date      No Growth to date      No Growth to date    Narrative:      Aerobic and anaerobic    Aerobic culture [[id number]] Collected: 07/04/20 1212    Order Status: Completed Specimen: Skin from Toe, Left Foot Updated: 07/05/20 0808     Aerobic Bacterial Culture No growth    Narrative:      Left great toe    Aerobic culture [[id number]] Collected: 07/04/20 1212    Order Status: Completed Specimen:  Body Fluid from Toe, Left Foot Updated: 07/05/20 0807     Aerobic Bacterial Culture No growth    Narrative:      Left great toe swab    Aerobic culture [[id number]] Collected: 07/03/20 1540    Order Status: Completed Specimen: Wound from Toe, Left Foot Updated: 07/05/20 0737     Aerobic Bacterial Culture No significant isolate    Gram stain [[id number]] Collected: 07/04/20 1212    Order Status: Completed Specimen: Tissue from Toe, Left Foot Updated: 07/04/20 1337     Gram Stain Result No WBC's      No organisms seen    Narrative:      Left great toe    Fungus culture [[id number]] Collected: 07/04/20 1212    Order Status: Sent Specimen: Tissue from Toe, Left Foot Updated: 07/04/20 1255        Specimen (12h ago, onward)    None          Diagnostic Results:  I have reviewed all pertinent imaging results/findings within the past 24 hours.  US VASILIY 07/06/20: No hemodynamically significant stenosis within the left lower extremity.  Monophasic waveform present within the left posterior tibial artery indicative of peripheral arterial disease however ABIs are normal.       Clinical Findings:  07/06: Left foot incision site macerated, surrounding cellulitis contained to foot, no dehiscence, no drainage. No fluctuance, no crepitus.      07/05/20 07/05/20 07/03/20

## 2020-07-06 NOTE — NURSING
"Nurse reviewing D/C orders  With new insulin orders : insulin Detemir U-100 35 units in am and pm . [facility] was called and notified the patient over 200.00 . Patient stated, " too expensive he cannot avoid it . Nurse notified Hospital Medicine Team Dr Feliciano Team cannot avoid he is currently on Lantus Solo Star 55 units in am and Novolin R 20 units each meal  is rewriting orders .  "

## 2020-07-06 NOTE — PLAN OF CARE
07/06/20 1554   Post-Acute Status   Post-Acute Authorization Home Health   Post-Acute Placement Status Set-up Complete   Home Health Status Set-up Complete   Patient set up with [facility] for post acute care. Next available date for home care admission is Thurs, 7/9. MD approved this date for patient to begin home care services. At this time, HH set up complete.  Zoila Almazan LMSW  [facility]

## 2020-07-06 NOTE — DISCHARGE SUMMARY
[facility] Hospital Medicine  Discharge Summary      Patient Name: Billy Rivera  MRN: [id number]  Admission Date: 7/3/2020  Hospital Length of Stay: 3 days  Discharge Date and Time:  07/06/2020 4:02 PM  Attending Physician: Cece Feliciano MD   Discharging Provider: May Story MD  Primary Care Provider: BRAD Baker MD  Hospital Medicine Team: [facility] 5 May Story MD    HPI:   55-year-old male with DM type 2, HLD, HTN presents to the ED for evaluation of toe infection. Patient reports increased swelling in left lower extremity and redness spreading further up left foot and lower leg. Patient had a bone biopsy obtained by Podiatry on 7/4/20. He was started on Cipro at that time, currently on IV Vancomycin drip. Culture is growing Enterococcus faecalis. Sensitivities are pending. Patient denies fever, chills, night sweats, dyspnea, changes in bowel or bladder habits. Reports feeling fatigued and nauseous recently. Also reports poor appetite for the past week with episodes of vomiting he ascribes to antibiotic regimen.          Procedure(s) (LRB):  AMPUTATION, TOE (Left)      Hospital Course:   Mr. Rivera presented with a worsening left great open wound despite being on day 4 of antibiotics. He stated he had no pain on toe, but drainage and leg redness was worsening. Denied fevers or any other symptoms. Podiatry was consulted and amputated his toe with clean margins on 7/4/20. Pt tolerated procedure well. During stay his glucose has been above 300s, we have been  adjusting his insulin to get better control. On 7/6/2020 patient received 40 units of short acting insulin by error. BG have been monitored q 1 hour and his readings are still > 300s.   ID has been consulted for co-management of infection. Vancomycin switched to oral linezolid for 7 days. Patient will be discharged today with Endocrinology, ID and podiatry follow ups within 10 days      Temp:  [96 °F (35.6 °C)-100.1 °F  (37.8 °C)]   Pulse:  [54-79]   Resp:  [14-20]   BP: ()/(51-87)   SpO2:  [90 %-100 %]     Physical Exam   Constitutional: He is oriented to person, place, and time. He does not appear ill. No distress.   HENT:   Head: Normocephalic and atraumatic.   Cardiovascular: Normal rate and regular rhythm.   Pulmonary/Chest: Effort normal and breath sounds normal. No respiratory distress. He has no wheezes. He has no rales.   Abdominal: Soft. Normal appearance and bowel sounds are normal.   Musculoskeletal:      Comments: S/P amputation of the great toe of the left foot. Dressing applied. Photos obtained by podiatry - please refer to podiatry notes   Neurological: He is alert and oriented to person, place, and time.   Skin: He is not diaphoretic.   Psychiatric: His behavior is normal. Mood normal.   Nursing note and vitals reviewed.      Consults:   Consults (From admission, onward)        Status Ordering Provider     Inpatient consult to Infectious Diseases  Once     Provider:  (Not yet assigned)    Completed LALI BECK     Inpatient consult to Podiatry  Once     Provider:  (Not yet assigned)    Completed MARTHA DUVALL     Pharmacy to dose Vancomycin consult  Once     Provider:  (Not yet assigned)    Acknowledged LALI BECK          * Osteomyelitis of great toe of left foot  Left 1st  toe open wound with drainage, xray shows skin irregularity with scattered subcutaneous gas about the great toe. Bone biopsy performed by podiatry on 6/29/20, growing enterococcus faecalis. Pt was taking Cipro since 6/29. 1st toe amputation on 7/4/20  -WBC 11.23, trending down, continue monitoring  -Podiatry following, recommended partial weightbearing to left heel only in post op DARCO shoe for short distances <10 feet. Ordered LLE arterial US with ABIs given weakly palpable pulses and duskiness to surgical skin margins. If results are concerning, will consult vascular.   - Consulted ID for co-management; recommends switching  antibiotics from vanc to linezolid for 7 days with close outpatient follow up        Discharge planning issues  PT/OT eval : Home with HH  F/U with PCP for diabetes manangement    Obesity, diabetes, and hypertension syndrome  Encourage weight reduction    Diabetes mellitus with insulin therapy  Hgb A1C 13.1  Home medication: Glargine 55 U HS and metformin 1 g BID  Patient BG readings during hospitalization: 200s-300s we have been monitoring and adjusting his insulin. Will discharge patient on detemir 35 U BID and metformin 1 g BID and follow up with endocrinology in 10 days. Patient was educated and instructed about the complications of DM. Changes to his medications and importance of adherence was also discussed.      Dyslipidemia associated with type 2 diabetes mellitus  Continue home medication Atorvastatin      Type 2 diabetes mellitus 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, with long-term current use of insulin  See Diabetes mellitus with insulin therapy      Hypertension associated with diabetes  Continue home medication Lisinopril 40 mg oral daily      PVD (peripheral vascular disease)  Patient with left posterior tibial artery PAD. No  Hemodynamically significant stenosis    - Continue home medications ASA and atorvastatin  - PT/OT      Coronary artery disease involving native coronary artery of native heart without angina pectoris  -Continue home ASA      Dyslipidemia  - Continue home atorvastatin      HTN (hypertension)  -Continue home meds: carvedilol 12.5 and lisinopril 40mg        Final Active Diagnoses:    Diagnosis Date Noted POA    PRINCIPAL PROBLEM:  Osteomyelitis of great toe of left foot [M86.9] 07/03/2020 Yes    Amputation of left great toe [S98.112A]  No    Discharge planning issues [Z02.9] 07/04/2020 Not Applicable    Diabetes mellitus with insulin therapy [E11.9, Z79.4] 08/06/2019 Not Applicable    Dyslipidemia associated with type 2  diabetes mellitus [E11.69, E78.5] 08/06/2019 Yes    Obesity, diabetes, and hypertension syndrome [E11.69, I10, E66.9, E11.59] 08/06/2019 Yes    Type 2 diabetes mellitus 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, with long-term current use of insulin [E11.3393, Z79.4] 07/05/2017 Not Applicable    Hypertension associated with diabetes [E11.59, I10] 03/30/2017 Yes    PVD (peripheral vascular disease) [I73.9] 06/15/2015 Yes    Coronary artery disease involving native coronary artery of native heart without angina pectoris [I25.10] 06/25/2013 Yes    Dyslipidemia [E78.5] 05/22/2013 Yes    HTN (hypertension) [I10] 09/27/2012 Yes      Problems Resolved During this Admission:       Discharged Condition: stable    Disposition: Home or Self Care    Follow Up:  Follow-up Information     Schedule an appointment as soon as possible for a visit with BRAD Baker MD.    Specialty: Internal Medicine  Contact information:  [address] [facility] [address]  [phone]                 Patient Instructions:      Ambulatory referral/consult to Podiatry   Standing Status: Future   Referral Priority: Routine Referral Type: Consultation   Referral Reason: Specialty Services Required   Requested Specialty: Podiatry   Number of Visits Requested: 1     Ambulatory referral/consult to Infectious Disease   Standing Status: Future   Referral Priority: Routine Referral Type: Consultation   Referral Reason: Specialty Services Required   Requested Specialty: Infectious Diseases   Number of Visits Requested: 1     Ambulatory referral/consult to Endocrinology   Standing Status: Future   Referral Priority: Routine Referral Type: Consultation   Requested Specialty: Endocrinology   Number of Visits Requested: 1     Diet diabetic     Notify your health care provider if you experience any of the following:  temperature >100.4     Notify your health care provider if you experience  any of the following:  persistent nausea and vomiting or diarrhea     Notify your health care provider if you experience any of the following:  severe uncontrolled pain     Notify your health care provider if you experience any of the following:  redness, tenderness, or signs of infection (pain, swelling, redness, odor or green/yellow discharge around incision site)     Notify your health care provider if you experience any of the following:  difficulty breathing or increased cough     Notify your health care provider if you experience any of the following:  severe persistent headache     Notify your health care provider if you experience any of the following:  worsening rash     Notify your health care provider if you experience any of the following:  persistent dizziness, light-headedness, or visual disturbances     Notify your health care provider if you experience any of the following:  increased confusion or weakness     Activity as tolerated       Significant Diagnostic Studies: Labs:   BMP:   Recent Labs   Lab 07/05/20 0427   *   *   K 4.4   CL 97   CO2 26   BUN 18   CREATININE 1.0   CALCIUM 8.7   MG 1.8   , CMP   Recent Labs   Lab 07/05/20 0427   *   K 4.4   CL 97   CO2 26   *   BUN 18   CREATININE 1.0   CALCIUM 8.7   ANIONGAP 9   ESTGFRAFRICA >60.0   EGFRNONAA >60.0   , CBC   Recent Labs   Lab 07/05/20 0427   WBC 9.92   HGB 12.2*   HCT 36.1*      , INR   Lab Results   Component Value Date    INR 1.0 07/03/2020    INR 1.0 05/24/2013    INR 1.0 05/23/2013   , Lipid Panel   Lab Results   Component Value Date    CHOL 172 08/07/2019    HDL 40 08/07/2019    LDLCALC 100.8 08/07/2019    TRIG 156 (H) 08/07/2019    CHOLHDL 23.3 08/07/2019   , Troponin No results for input(s): TROPONINI in the last 168 hours., A1C:   Recent Labs   Lab 04/14/20  1555 07/03/20 2020   HGBA1C 13.1* 11.8*    and All labs within the past 24 hours have been reviewed    Pending Diagnostic Studies:      "Procedure Component Value Units Date/Time    Specimen to Pathology, Surgery Other [[id number]] Collected: 07/04/20 1232    Order Status: Sent Lab Status: In process Updated: 07/04/20 1233    Specimen to Pathology, Surgery Other (Podiatry) [[id number]] Collected: 07/04/20 1212    Order Status: Sent Lab Status: In process Updated: 07/06/20 1115         Medications:  Reconciled Home Medications:      Medication List      START taking these medications    * insulin detemir U-100 100 unit/mL (3 mL) Inpn pen  Commonly known as: LEVEMIR FLEXTOUCH  Inject 35 Units into the skin every evening.     * insulin detemir U-100 100 unit/mL (3 mL) Inpn pen  Commonly known as: LEVEMIR FLEXTOUCH  Inject 35 Units into the skin once daily. In the morning  Start taking on: July 7, 2020  Replaces: insulin glargine 100 units/mL (3mL) SubQ pen     linezolid 600 mg Tab  Commonly known as: ZYVOX  Take 1 tablet (600 mg total) by mouth every 12 (twelve) hours. for 7 days         * This list has 2 medication(s) that are the same as other medications prescribed for you. Read the directions carefully, and ask your doctor or other care provider to review them with you.            CHANGE how you take these medications    carvediloL 12.5 MG tablet  Commonly known as: COREG  Take 1 tablet (12.5 mg total) by mouth 2 (two) times daily.  What changed:   · how much to take  · how to take this  · when to take this  · additional instructions     pen needle, diabetic 31 gauge x 3/16" Ndle  Inject 1 each into the skin 3 (three) times daily before meals.  What changed: when to take this        CONTINUE taking these medications    aspirin 81 MG EC tablet  Commonly known as: ECOTRIN  Take 1 tablet (81 mg total) by mouth once daily.     atorvastatin 80 MG tablet  Commonly known as: LIPITOR  Take 1 tablet (80 mg total) by mouth once daily.     BIOTIN ORAL  Take 1 capsule by mouth once daily.     blood sugar diagnostic Strp  Strips and lancets    Use before meals and " bedtime     lisinopriL 40 MG tablet  Commonly known as: PRINIVIL,ZESTRIL  Take 1 tablet (40 mg total) by mouth once daily.     metFORMIN 1000 MG tablet  Commonly known as: GLUCOPHAGE  Take 1 tablet (1,000 mg total) by mouth 2 (two) times daily with meals.        STOP taking these medications    ciprofloxacin HCl 500 MG tablet  Commonly known as: CIPRO     clindamycin 300 MG capsule  Commonly known as: CLEOCIN     ibuprofen 800 MG tablet  Commonly known as: ADVIL,MOTRIN     insulin glargine 100 units/mL (3mL) SubQ pen  Commonly known as: LANTUS SOLOSTAR U-100 INSULIN  Replaced by: insulin detemir U-100 100 unit/mL (3 mL) Inpn pen     insulin regular 100 unit/mL injection            Indwelling Lines/Drains at time of discharge:   Lines/Drains/Airways     None                 Time spent on the discharge of patient: 35  minutes  Patient was seen and examined on the date of discharge and determined to be suitable for discharge.         May Story MD  Department of Hospital Medicine  [facility]

## 2020-07-06 NOTE — PROGRESS NOTES
[facility]  Podiatry  Progress Note    Patient Name: Billy Sheffield Miguel  MRN: [id number]  Admission Date: 7/3/2020  Hospital Length of Stay: 3 days  Attending Physician: Cece Feliciano MD  Primary Care Provider: BRAD Baker MD     Subjective:     Interval History: NAEON. Pain denies any pedal pain. Intraop margins NGTD. Remains afebrile, hemodynamically stable. There was serosanguinous strikethrough to LLE dressings, patient reports walking barefoot only to go to the restroom. Patient denies N/V/F/C    Follow-up For: Procedure(s) (LRB):  AMPUTATION, TOE (Left)    Post-Operative Day: 2 Days Post-Op    Scheduled Meds:   aspirin  81 mg Oral Daily    atorvastatin  80 mg Oral Daily    carvediloL  12.5 mg Oral BID    enoxaparin  40 mg Subcutaneous Q24H    insulin aspart U-100  8 Units Subcutaneous TIDWM    insulin detemir U-100  48 Units Subcutaneous Daily    Lactobacillus rhamnosus GG  1 capsule Oral Daily    lisinopriL  40 mg Oral Daily    vancomycin (VANCOCIN) IVPB  1,750 mg Intravenous Q12H     Continuous Infusions:  PRN Meds:acetaminophen, albuterol-ipratropium, dextrose 50%, dextrose 50%, glucagon (human recombinant), glucose, glucose, insulin aspart U-100, ondansetron, ondansetron, promethazine, promethazine, sodium chloride 0.9%, sodium chloride 0.9%, Pharmacy to dose Vancomycin consult **AND** vancomycin - pharmacy to dose    Review of Systems   Constitutional: Positive for activity change. Negative for appetite change, chills, fatigue and fever.   Respiratory: Negative for cough and shortness of breath.    Cardiovascular: Positive for leg swelling.   Gastrointestinal: Negative for nausea and vomiting.   Musculoskeletal: Negative for arthralgias and myalgias.   Skin: Positive for color change and wound.   Neurological: Positive for numbness. Negative for weakness.   Hematological: Negative for adenopathy.   Psychiatric/Behavioral: Negative for confusion.     Objective:      Vital Signs (Most Recent):  Temp: 97.1 °F (36.2 °C) (07/06/20 0758)  Pulse: 60 (07/06/20 0758)  Resp: 17 (07/06/20 0758)  BP: (!) 154/83 (07/06/20 0758)  SpO2: 95 % (07/06/20 0758) Vital Signs (24h Range):  Temp:  [97.1 °F (36.2 °C)-99.3 °F (37.4 °C)] 97.1 °F (36.2 °C)  Pulse:  [60-67] 60  Resp:  [16-18] 17  SpO2:  [95 %-97 %] 95 %  BP: (123-154)/(60-83) 154/83     Weight: 127 kg (280 lb)  Body mass index is 35.95 kg/m².    Foot Exam    Right Foot/Ankle     Inspection and Palpation  Ecchymosis: none  Tenderness: none   Skin Exam: callus; (R great toe callus)    Neurovascular  Dorsalis pedis: 1+  Posterior tibial: 1+  Saphenous nerve sensation: diminished  Tibial nerve sensation: diminished  Superficial peroneal nerve sensation: diminished  Deep peroneal nerve sensation: diminished  Sural nerve sensation: diminished      Left Foot/Ankle      Inspection and Palpation  Ecchymosis: none  Tenderness: none   Swelling: (+2 pitting edema to LLE)  Skin Exam: cellulitis, ulcer and erythema; (s/p left hallux amp. Erythema from surgical site extending to mid lower leg)    Neurovascular  Dorsalis pedis: 1+  Posterior tibial: 1+  Saphenous nerve sensation: diminished  Tibial nerve sensation: diminished  Superficial peroneal nerve sensation: diminished  Deep peroneal nerve sensation: diminished  Sural nerve sensation: diminished            Laboratory:  BMP:   Recent Labs   Lab 07/05/20 0427   *   *   K 4.4   CL 97   CO2 26   BUN 18   CREATININE 1.0   CALCIUM 8.7   MG 1.8     CBC:   Recent Labs   Lab 07/05/20 0427   WBC 9.92   RBC 4.23*   HGB 12.2*   HCT 36.1*      MCV 85   MCH 28.8   MCHC 33.8     CRP: No results for input(s): CRP in the last 168 hours.  ESR:   Recent Labs   Lab 07/05/20 0427   SEDRATE 103*     Wound Cultures:   Recent Labs   Lab 06/29/20  1308 07/03/20  1540 07/04/20  1212   LABAERO ENTEROCOCCUS FAECALIS  Many  * No significant isolate No growth  No growth     Microbiology Results  (last 7 days)     Procedure Component Value Units Date/Time    Culture, Anaerobe [[id number]] Collected: 07/04/20 1212    Order Status: Completed Specimen: Toe, Left Foot Updated: 07/06/20 0722     Anaerobic Culture Culture in progress    Narrative:      Left great toe swab    Culture, Anaerobe [[id number]] Collected: 07/04/20 1212    Order Status: Completed Specimen: Tissue from Toe, Left Foot Updated: 07/06/20 0722     Anaerobic Culture Culture in progress    Narrative:      Left great toe    Blood culture (site 1) [[id number]] Collected: 07/03/20 2020    Order Status: Completed Specimen: Blood Updated: 07/05/20 2212     Blood Culture, Routine No Growth to date      No Growth to date      No Growth to date    Narrative:      Site # 1, aerobic and anaerobic    Blood culture (site 2) [[id number]] Collected: 07/03/20 2020    Order Status: Completed Specimen: Blood Updated: 07/05/20 2212     Blood Culture, Routine No Growth to date      No Growth to date      No Growth to date    Narrative:      Site # 2, aerobic only    AFB Culture & Smear [[id number]] Collected: 07/04/20 1212    Order Status: Completed Specimen: Tissue from Toe, Left Foot Updated: 07/05/20 2127     AFB Culture & Smear Culture in progress    Narrative:      Left great toe    Blood culture x two cultures. Draw prior to antibiotics. [[id number]] Collected: 07/03/20 1541    Order Status: Completed Specimen: Blood from Peripheral, Antecubital, Right Updated: 07/05/20 1812     Blood Culture, Routine No Growth to date      No Growth to date      No Growth to date    Narrative:      Aerobic and anaerobic    Blood culture x two cultures. Draw prior to antibiotics. [[id number]] Collected: 07/03/20 1450    Order Status: Completed Specimen: Blood from Peripheral, Forearm, Left Updated: 07/05/20 1812     Blood Culture, Routine No Growth to date      No Growth to date      No Growth to date    Narrative:      Aerobic and anaerobic    Aerobic culture [[id number]]  Collected: 07/04/20 1212    Order Status: Completed Specimen: Skin from Toe, Left Foot Updated: 07/05/20 0808     Aerobic Bacterial Culture No growth    Narrative:      Left great toe    Aerobic culture [[id number]] Collected: 07/04/20 1212    Order Status: Completed Specimen: Body Fluid from Toe, Left Foot Updated: 07/05/20 0807     Aerobic Bacterial Culture No growth    Narrative:      Left great toe swab    Aerobic culture [[id number]] Collected: 07/03/20 1540    Order Status: Completed Specimen: Wound from Toe, Left Foot Updated: 07/05/20 0737     Aerobic Bacterial Culture No significant isolate    Gram stain [[id number]] Collected: 07/04/20 1212    Order Status: Completed Specimen: Tissue from Toe, Left Foot Updated: 07/04/20 1337     Gram Stain Result No WBC's      No organisms seen    Narrative:      Left great toe    Fungus culture [[id number]] Collected: 07/04/20 1212    Order Status: Sent Specimen: Tissue from Toe, Left Foot Updated: 07/04/20 1255        Specimen (12h ago, onward)    None          Diagnostic Results:  I have reviewed all pertinent imaging results/findings within the past 24 hours.  US VASILIY 07/06/20: No hemodynamically significant stenosis within the left lower extremity.  Monophasic waveform present within the left posterior tibial artery indicative of peripheral arterial disease however ABIs are normal.       Clinical Findings:  07/06: Left foot incision site macerated, surrounding cellulitis contained to foot, no dehiscence, no drainage. No fluctuance, no crepitus.      07/05/20 07/05/20 07/03/20          Assessment/Plan:     * Osteomyelitis of great toe of left foot  Plan:  POD#2 s/p L great toe amputation (DOS:7/4/2020). No further surgical intervention warranted from podiatry standpoint.  NGTD on cultures.  Arterial US unremarkable. VASILIY WNL. LLE triphasic throughout except for monophasic PT. No stenosis.  2 weeks PO abx per ID for SSTI.   PWB on heel only in postop shoe. Shoe  "ordered.  Continue elevating LLE  PT  Keep LLE dressings c/d/i.   Rest of care per primary    DC Instructions:  Patient is to follow up with podiatry within 10 days of discharge with Dr. Santana. Keep dressings c/d/i until follow up in clinic.  Podiatry will arrange appt following d/c.           Amputation of left great toe  See "Osteomyelitis of great toe of left foot"        Phoebe Murphy DPM  [facility]  Podiatry PGY2  Pager: [phone]    "

## 2020-07-06 NOTE — PROGRESS NOTES
"[facility]  Infectious Disease  Progress Note    Patient Name: Billy Sheffield Miguel  MRN: [id number]  Admission Date: 7/3/2020  Length of Stay: 3 days  Attending Physician: Cece Feliciano MD  Primary Care Provider: BRAD Baker MD    Isolation Status: No active isolations  Assessment/Plan:      * Osteomyelitis of great toe of left foot     55 year old with DM, HTN, CAD, history of colon CA s/p resection in 2017 who presented to ED with worsening left toe swelling and erythema. Infection present approximately two weeks prior to admission and had been on cipro and clindamycin. Seen by Podiatry as outpatient and bone biopsy on 6/29.  Developed worsening swelling and ascending erythema.  Imaging concerning for gas forming infection/cellulitis.  No definite osseous erosion.   Bone cultures from biopsy showing E faecalis - ampicillin S.   Underwent left great toe amputation on 7/4.  Clean margins NGTD.  Per operative note, there was no abscess, tracking or tunneling. Remaining first metatarsal head appeared healthy and shiny.  ID consulted for antibiotic recommendations.        Isolate is ampicillin sensitive, but patient has reported severe PCN allergy ("coma" when he was a child).       Plan/recommendations:  1.  Continue IV vancomycin for now for prior e faecalis.  Pharmacy to dose. Trough goal 15-20  2.  Follow clean margin cultures and pathology.  If positive will need long term antibiotics.  If negative, will need two weeks oral abx.  Given reported severe PCN allergy, will do linezolid x 1 week with close f/u with ID.   4. Recommend PCN allergy testing as outpatient  5.  ID will sign off. Please call if with questions    Data reviewed and plan discussed with ID staff              Thank you for your consult. I will sign off. Please contact us if you have any additional questions.    Elizabeth Weinberg PA-C  Infectious Disease  [facility]    Subjective:     Principal " Problem:Osteomyelitis of great toe of left foot    HPI: Billy Rivera is a 55 year old with DM, HTN, CAD, history of colon CA s/p resection in 2017 who presented to ED with left toe swelling and erythema. Prior history 2018 of MSSA infection of right foot.  Patient developed left great toe wound two weeks prior after wearing new shoes that were too tight.  Seen by PCP and prescribed course of clindamycin.  An xray of the foot on 6/28 was without evidence of osteo or soft tissue emphysema.   He was then seen in Podiatry on 6/29 and underwent left nail avulsion and I&D of the left great toe.  Bone biopsy obtained and was prescribed ciprofloxacin to take with the clinda.  He had subsequent worsening swelling and redness of the foot and ascending erythema.  No associated fevers, chills.  He reports prior history of DM foot infections but no history of osteo.     In ED noted to have WBC 12, ESR 73, procal and lactate wnl.  Xray of foot showed scattered subcutaneous gas.  IV vancomycin was started.      Bone cultures showing E faecalis - susceptibilities pending.  He is undergoing left great toe amputation today.  ID is consulted for antibiotic recommendations.    Interval History:   Surgical cultures NGTD  Path pending  On vancomycin  No fever chills or night sweats      Review of Systems   Constitutional: Negative for activity change, appetite change, chills, fatigue and fever.   HENT: Negative.    Eyes: Negative.    Respiratory: Negative for cough, shortness of breath and wheezing.    Cardiovascular: Positive for leg swelling. Negative for chest pain.   Gastrointestinal: Negative for abdominal pain, diarrhea, nausea and vomiting.   Genitourinary: Negative for dysuria.   Musculoskeletal: Positive for arthralgias. Negative for myalgias.   Skin: Positive for color change and wound.   Neurological: Negative for dizziness and numbness.   Psychiatric/Behavioral: Negative for agitation and confusion.     Objective:     Vital  Signs (Most Recent):  Temp: 98.2 °F (36.8 °C) (07/06/20 1145)  Pulse: 64 (07/06/20 1145)  Resp: 17 (07/06/20 0758)  BP: (!) 146/79 (07/06/20 1145)  SpO2: 96 % (07/06/20 1145) Vital Signs (24h Range):  Temp:  [97.1 °F (36.2 °C)-99.3 °F (37.4 °C)] 98.2 °F (36.8 °C)  Pulse:  [60-67] 64  Resp:  [16-18] 17  SpO2:  [95 %-97 %] 96 %  BP: (123-154)/(60-83) 146/79     Weight: 127 kg (280 lb)  Body mass index is 35.95 kg/m².    Estimated Creatinine Clearance: 116.8 mL/min (based on SCr of 1 mg/dL).    Physical Exam  Vitals signs and nursing note reviewed.   Constitutional:       General: He is not in acute distress.     Appearance: He is not ill-appearing, toxic-appearing or diaphoretic.   HENT:      Head: Normocephalic and atraumatic.   Eyes:      General: No scleral icterus.     Conjunctiva/sclera: Conjunctivae normal.   Neck:      Musculoskeletal: Normal range of motion.   Cardiovascular:      Rate and Rhythm: Normal rate and regular rhythm.   Pulmonary:      Effort: Pulmonary effort is normal.      Breath sounds: Normal breath sounds.   Abdominal:      Palpations: Abdomen is soft.   Musculoskeletal:         General: Swelling present.      Comments: Left foot with surgical dressing in place.  C/d/i.    Ascending erythema and warmth noted above dressing into ankle.   Podiatry photos of today reviewed. See below.    Skin:     General: Skin is warm and dry.   Neurological:      Mental Status: He is alert.   Psychiatric:         Mood and Affect: Mood normal.         Behavior: Behavior normal.         Significant Labs: All pertinent labs within the past 24 hours have been reviewed.    Significant Imaging: I have reviewed all pertinent imaging results/findings within the past 24 hours.

## 2020-07-06 NOTE — ASSESSMENT & PLAN NOTE
Plan:  POD#2 s/p L great toe amputation (DOS:7/4/2020). No further surgical intervention warranted from podiatry standpoint.  NGTD on cultures.  Arterial US unremarkable. VASILIY WNL. LLE triphasic throughout except for monophasic PT. No stenosis.  2 weeks PO abx per ID for SSTI.   PWB on heel only in postop shoe. Shoe ordered.  Continue elevating LLE  PT  Keep LLE dressings c/d/i.   Rest of care per primary    DC Instructions:  Patient is to follow up with podiatry within 10 days of discharge with Dr. Santana. Keep dressings c/d/i until follow up in clinic.  Podiatry will arrange appt following d/c.

## 2020-07-06 NOTE — PLAN OF CARE
Problem: Physical Therapy Goal  Goal: Physical Therapy Goal  Description: Goals to be met by: 20    Patient will increase functional independence with mobility by performin. Supine to sit with Set-up [place]  2. Sit to supine with Set-up [place]  3. Sit to stand transfer with Supervision  4. Gait  x 150 feet with Supervision using Rolling Walker.  5.  Patient will demonstrate independence with a home exercise program  6. Patient's balance will be GOOD: Needs SUPERVISION only during gait and able to self right with moderate LOB.  7. Ascend/descend 3 stair with bilateral Handrails Contact Guard Assistance.     2020 1516 by Luis Espinoza, PT  Outcome: Met     Patient progressing appropriately towards current goals and plan of care remains appropriate at this time. Patient demonstrates good motivation and good activity tolerance secondary to no pain.  Patient demonstrates baseline impulsivity but ambulated 25 ft with rolling walker and SBA with good safety.  Patient demonstrated good safety to ascend/descend curb step with rolling walker (x2) for home training.  Patient demonstrates good overall mobility and appears to be at baseline with safety.  Patient is without further skilled PT needs at this time and safe for discharge home, from a therapy perspective, when the patient is medically ready.      Luis Espinoza, PT, DPT  2020  Pager #: [phone]

## 2020-07-06 NOTE — PT/OT/SLP PROGRESS
"Physical Therapy Treatment and discharge    Patient Name:  Billy Rivera   MRN:  [id number]    Recommendations:     Discharge Recommendations:  home   Discharge Equipment Recommendations: bedside commode   Barriers to discharge: none    Assessment:     Billy Rivera is a 55 y.o. male admitted with a medical diagnosis of Osteomyelitis of great toe of left foot.  He presents with the following impairments/functional limitations:  weakness, impaired endurance, impaired self care skills, impaired functional mobilty, impaired sensation, gait instability, impaired balance, pain, decreased safety awareness, decreased lower extremity function, impaired coordination, orthopedic precautions.      Patient demonstrates good motivation and good activity tolerance secondary to no pain.  Patient demonstrates baseline impulsivity but ambulated 25 ft with rolling walker and SBA with good safety.  Patient demonstrated good safety to ascend/descend curb step with rolling walker (x2) for home training.  Patient demonstrates good overall mobility and appears to be at baseline with safety.  Patient is without further skilled PT needs at this time and safe for discharge home, from a therapy perspective, when the patient is medically ready.      Rehab Prognosis: Good; patient without further acute care skilled PT needs.  Patient remains most appropriate to discharge to home  Recent Surgery: Procedure(s) (LRB):  AMPUTATION, TOE (Left) 2 Days Post-Op    Plan:     During this hospitalization, patient will no longer be seen for skilled PT.      Subjective     Subjective: "I feel good"   Pain/Comfort:  · Pain Rating 1: 0/10  · Location - Side 1: Left  · Location - Orientation 1: distal  · Location 1: foot  · Pain Addressed 1: Reposition, Cessation of Activity      Objective:     Communicated with RN prior to session.  Patient found supine with telemetry upon PT entry to room.     General Precautions: Standard, fall   Orthopedic " "Precautions:LLE non weight bearing   Braces: N/A     Functional Mobility:  · Bed Mobility:   Patient seated in chair at start of session.  · Transfers:     · Sit to Stand:  stand by assistance with rolling walker  · Gait: Patient ambulated 25 ft with rolling walker and stand by assistance and maintenance of PWB on heel.  Distance limited for precautions.  · Balance:   · Sitting: GOOD: Maintains balance through MODERATE excursions of active trunk movement  · Standing: GOOD-: Needs SUPERVISION only during gait and able to self right with moderate LOB inconsistently  · Stairs:  Pt ascended/descended 6" curb step x 2 with Rolling Walker with no handrails with Contact Guard Assistance.       AM-PAC 6 CLICK MOBILITY  Turning over in bed (including adjusting bedclothes, sheets and blankets)?: 4  Sitting down on and standing up from a chair with arms (e.g., wheelchair, bedside commode, etc.): 4  Moving from lying on back to sitting on the side of the bed?: 4  Moving to and from a bed to a chair (including a wheelchair)?: 4  Need to walk in hospital room?: 4  Climbing 3-5 steps with a railing?: 4  Basic Mobility Total Score: 24       Therapeutic Activities and Exercises:  Gait training:  Patient required cues for position in walker to increase independence and safety.  Patient required cues ~ %10 of the time.    Patient Education:    Patient educated on assistive device use, bed mobility training, Fall risk, gait training, home safety, Home exercise program and transfer training by explanation.  Patient was receptive to education and verbalizes understanding.     Patient left up in chair with all lines intact and call button in reach.    GOALS:   Multidisciplinary Problems     Physical Therapy Goals     Not on file          Multidisciplinary Problems (Resolved)        Problem: Physical Therapy Goal    Goal Priority Disciplines Outcome Goal Variances Interventions   Physical Therapy Goal   (Resolved)     PT, PT/OT Met   "   Description: Goals to be met by: 20    Patient will increase functional independence with mobility by performin. Supine to sit with Set-up [place]  2. Sit to supine with Set-up [place]  3. Sit to stand transfer with Supervision  4. Gait  x 150 feet with Supervision using Rolling Walker.  5.  Patient will demonstrate independence with a home exercise program  6. Patient's balance will be GOOD: Needs SUPERVISION only during gait and able to self right with moderate LOB.  7. Ascend/descend 3 stair with bilateral Handrails Contact Guard Assistance.                      Time Tracking:     PT Received On: 20  PT Start Time: 1319     PT Stop Time: 1329  PT Total Time (min): 10 min     Billable Minutes: Gait Training 10 min    Treatment Type: Treatment  PT/PTA: PT     PTA Visit Number: 0     Luis Espinoza, PT  2020

## 2020-07-06 NOTE — SUBJECTIVE & OBJECTIVE
Interval History:   Surgical cultures NGTD  Path pending  On vancomycin  No fever chills or night sweats      Review of Systems   Constitutional: Negative for activity change, appetite change, chills, fatigue and fever.   HENT: Negative.    Eyes: Negative.    Respiratory: Negative for cough, shortness of breath and wheezing.    Cardiovascular: Positive for leg swelling. Negative for chest pain.   Gastrointestinal: Negative for abdominal pain, diarrhea, nausea and vomiting.   Genitourinary: Negative for dysuria.   Musculoskeletal: Positive for arthralgias. Negative for myalgias.   Skin: Positive for color change and wound.   Neurological: Negative for dizziness and numbness.   Psychiatric/Behavioral: Negative for agitation and confusion.     Objective:     Vital Signs (Most Recent):  Temp: 98.2 °F (36.8 °C) (07/06/20 1145)  Pulse: 64 (07/06/20 1145)  Resp: 17 (07/06/20 0758)  BP: (!) 146/79 (07/06/20 1145)  SpO2: 96 % (07/06/20 1145) Vital Signs (24h Range):  Temp:  [97.1 °F (36.2 °C)-99.3 °F (37.4 °C)] 98.2 °F (36.8 °C)  Pulse:  [60-67] 64  Resp:  [16-18] 17  SpO2:  [95 %-97 %] 96 %  BP: (123-154)/(60-83) 146/79     Weight: 127 kg (280 lb)  Body mass index is 35.95 kg/m².    Estimated Creatinine Clearance: 116.8 mL/min (based on SCr of 1 mg/dL).    Physical Exam  Vitals signs and nursing note reviewed.   Constitutional:       General: He is not in acute distress.     Appearance: He is not ill-appearing, toxic-appearing or diaphoretic.   HENT:      Head: Normocephalic and atraumatic.   Eyes:      General: No scleral icterus.     Conjunctiva/sclera: Conjunctivae normal.   Neck:      Musculoskeletal: Normal range of motion.   Cardiovascular:      Rate and Rhythm: Normal rate and regular rhythm.   Pulmonary:      Effort: Pulmonary effort is normal.      Breath sounds: Normal breath sounds.   Abdominal:      Palpations: Abdomen is soft.   Musculoskeletal:         General: Swelling present.      Comments: Left foot with  surgical dressing in place.  C/d/i.    Ascending erythema and warmth noted above dressing into ankle.   Podiatry photos of today reviewed. See below.    Skin:     General: Skin is warm and dry.   Neurological:      Mental Status: He is alert.   Psychiatric:         Mood and Affect: Mood normal.         Behavior: Behavior normal.         Significant Labs: All pertinent labs within the past 24 hours have been reviewed.    Significant Imaging: I have reviewed all pertinent imaging results/findings within the past 24 hours.

## 2020-07-06 NOTE — ANESTHESIA POSTPROCEDURE EVALUATION
Anesthesia Post Evaluation    Patient: Billy Rivera    Procedure(s) Performed: Procedure(s) (LRB):  AMPUTATION, TOE (Left)    Final Anesthesia Type: MAC    Patient location during evaluation: PACU  Patient participation: Yes- Able to Participate  Level of consciousness: awake and alert  Post-procedure vital signs: reviewed and stable  Pain management: adequate  Airway patency: patent    PONV status at discharge: No PONV  Anesthetic complications: no      Cardiovascular status: blood pressure returned to baseline  Respiratory status: unassisted  Hydration status: euvolemic  Follow-up not needed.          Vitals Value Taken Time   /79 07/06/20 1145   Temp 36.8 °C (98.2 °F) 07/06/20 1145   Pulse 64 07/06/20 1145   Resp 17 07/06/20 0758   SpO2 96 % 07/06/20 1145         Event Time   Out of Recovery 07/04/2020 13:00:00         Pain/Gabriel Score: Pain Rating Prior to Med Admin: 4 (7/5/2020 12:12 AM)

## 2020-07-06 NOTE — ASSESSMENT & PLAN NOTE
Hgb A1C 13.1  Home medication: Glargine 55 U HS and metformin 1 g BID  Patient BG readings during hospitalization: 200s-300s we have been monitoring and adjusting his insulin. Will discharge patient on detemir 35 U BID and metformin 1 g BID and follow up with endocrinology in 10 days. Patient was educated and instructed about the complications of DM. Changes to his medications and importance of adherence was also discussed.

## 2020-07-07 ENCOUNTER — TELEPHONE (OUTPATIENT)
Dept: PODIATRY | Facility: CLINIC | Age: 55
End: 2020-07-07

## 2020-07-07 ENCOUNTER — PATIENT OUTREACH (OUTPATIENT)
Dept: ADMINISTRATIVE | Facility: CLINIC | Age: 55
End: 2020-07-07

## 2020-07-07 ENCOUNTER — TELEPHONE (OUTPATIENT)
Dept: HEPATOLOGY | Facility: CLINIC | Age: 55
End: 2020-07-07

## 2020-07-07 LAB
BACTERIA SPEC AEROBE CULT: NO GROWTH
BACTERIA SPEC AEROBE CULT: NO GROWTH
FINAL PATHOLOGIC DIAGNOSIS: NORMAL
GROSS: NORMAL

## 2020-07-07 NOTE — PLAN OF CARE
07/06/20 1633   Final Note   Assessment Type Final Discharge Note   Anticipated Discharge Disposition Home-Health  ([place] )   Hospital Follow Up  Appt(s) scheduled? Yes   Discharge plans and expectations educations in teach back method with documentation complete? Yes     Future Appointments   Date Time Provider Department Center   7/14/2020  8:30 AM Mingo Melara DPM [facility] POD [address] [address]   7/14/2020  9:30 AM Dominic Randhawa PA-C [facility] ID [address]   8/11/2020  1:00 PM DRE Baker MD [facility] [place]

## 2020-07-07 NOTE — TELEPHONE ENCOUNTER
Spoke with patient.   He feels like toe dressing need to be changed.  Brown blood starting to show through and dressing is dirty.  HH not able to go out until Thursday for first post dc visit.  He will call podiatry clinic to see about coming in for dressing change first thing in the morning.

## 2020-07-07 NOTE — PATIENT INSTRUCTIONS
Discharge Instructions for Osteomyelitis  You have a condition called osteomyelitis. This is a bone infection caused by bacteria or fungi. The infection may have come from one area of your body and spread to the bone by traveling through the blood. Osteomyelitis is described as acute when the infection is new, and chronic when you have had the infection for a longer time. If you have any questions or concerns, call your healthcare provider.  Home care  · Take your medicine exactly as directed. If you were given antibiotics or antifungal medicine, make sure you finish the prescription--even if you feel better. If you dont finish the medicine, the infection may return and may make future infections harder to treat.  · Be careful not to injure the area where you have the infection.  · Carefully follow all instructions for taking care of any wounds.  · Use a splint, sling, or brace as directed by your healthcare provider.  Follow-up care  Make a follow-up appointment as directed by our staff.  When to seek medical care  Call your healthcare provider if you have any of the following:  · Increasing pain, redness, swelling, or drainage in the infected area  · Fever 100.4°F (38°C) or greater, or as advised  · Increasing fatigue or feeling tired   Date Last Reviewed: 12/1/2016  © 2872-7016 [employer]. [address] [place] [address], [place] [address]. All rights reserved. This information is not intended as a substitute for professional medical care. Always follow your healthcare professional's instructions.

## 2020-07-07 NOTE — TELEPHONE ENCOUNTER
----- Message from Lilibeth ANGEL Kraus sent at 7/7/2020  2:15 PM CDT -----  Contact: self/[phone]  Cc: May Story MD  Patient called in regards needing to talk with Dr Olvera about patient foot band is dirty with blood and needs to be change today, patient stated that he can not wait until Friday 07/10/20. Please call and advise. Thank you.

## 2020-07-08 LAB
BACTERIA BLD CULT: NORMAL

## 2020-07-08 PROCEDURE — G0180 PR HOME HEALTH MD CERTIFICATION: ICD-10-PCS | Mod: ,,, | Performed by: HOSPITALIST

## 2020-07-08 PROCEDURE — G0180 MD CERTIFICATION HHA PATIENT: HCPCS | Mod: ,,, | Performed by: HOSPITALIST

## 2020-07-09 LAB
BACTERIA SPEC ANAEROBE CULT: ABNORMAL
FINAL PATHOLOGIC DIAGNOSIS: NORMAL
GROSS: NORMAL

## 2020-07-13 NOTE — PROGRESS NOTES
Subjective:       Patient ID: Billy Rivera is a 55 y.o. male.    Chief Complaint: No chief complaint on file.    Pt is a 56 yo w/ DM, HTN, CAD, history of colon CA s/p resection in 2017 who presens for hospital f/u.  Pt recently admitted to hospital for worsening left toe swelling and erythema from known infection.  Imaging concerning for gas forming infection/cellulitis.  No definite osseous erosion.   Bone cultures were taken from biopsy which showed E faecalis - ampicillin S.  Pt further underwent left great toe amputation on 7/4.  At the time cxs were NGTD.  Per operative note, there was no abscess, tracking or tunneling. Remaining first metatarsal head appeared healthy and shiny.  ID consulted and recommended -Follow clean margin cultures and pathology.  If positive will need long term antibiotics.  If negative, will need two weeks oral abx.  treatment w/ linezolid x 1 week given pen allergy with close f/u with ID.     Pt here today w/o complaints.  Pt's path clean margin-Negative for inflammation or malignancy.  Cxs from sx now growing anaerobes.  Pt seen after visit w/ Podiatry. Cxs were taken at Podiatry visit.   He reports some wound breakdown and drainage of wound.  Reports drainage not purulent.  Pictures viewed from yesterday which showed wound dehiscence.      ABIs-7/5-No hemodynamically significant stenosis within the left lower extremity.  Monophasic waveform present within the left posterior tibial artery indicative of peripheral arterial disease however ABIs are normal.       Review of Systems   Constitutional: Negative for appetite change, chills, diaphoresis, fatigue, fever and unexpected weight change.   HENT: Negative for nasal congestion, ear pain, sore throat and tinnitus.    Eyes: Negative for pain, redness and visual disturbance.   Respiratory: Negative for cough, shortness of breath and wheezing.    Cardiovascular: Negative for chest pain, palpitations and leg swelling.    Gastrointestinal: Negative for abdominal pain, constipation, diarrhea, rectal pain and vomiting.   Endocrine: Negative for cold intolerance and heat intolerance.   Genitourinary: Negative for dysuria, flank pain, frequency, hematuria and urgency.   Musculoskeletal: Negative for arthralgias, back pain, myalgias and neck pain.   Integumentary:  Positive for wound. Negative for rash.   Neurological: Negative for dizziness, light-headedness, numbness and headaches.   Hematological: Negative for adenopathy. Does not bruise/bleed easily.   Psychiatric/Behavioral: Negative for confusion and sleep disturbance. The patient is not nervous/anxious.          Objective:      Physical Exam  Constitutional:       General: He is not in acute distress.     Appearance: Normal appearance. He is well-developed.   HENT:      Head: Normocephalic and atraumatic.      Mouth/Throat:      Mouth: No oral lesions.      Pharynx: Uvula midline.   Eyes:      General: No scleral icterus.     Conjunctiva/sclera: Conjunctivae normal.      Pupils: Pupils are equal, round, and reactive to light.   Neck:      Musculoskeletal: Normal range of motion.   Cardiovascular:      Rate and Rhythm: Normal rate and regular rhythm.      Heart sounds: Normal heart sounds. No murmur. No friction rub. No gallop.    Pulmonary:      Effort: Pulmonary effort is normal. No respiratory distress.      Breath sounds: Normal breath sounds. No wheezing or rales.   Abdominal:      General: There is no distension.      Palpations: There is no mass.   Skin:     General: Skin is warm and dry.      Findings: No rash.      Comments: L foot in boot, wrapped  Pictures below   Neurological:      General: No focal deficit present.      Mental Status: He is alert and oriented to person, place, and time.       7/13       7/10        Assessment:       1. Diabetic ulcer of right midfoot associated with diabetes mellitus due to underlying condition, limited to breakdown of skin      Pt  s/p left great toe amputation 7/4.  Path negative, but with wound dehisence after surgery.  Plan:       -Will get CBC, ESR, CRP  -Start pt on Doxy 100mg BID and Flagyl 500 mg TID for wound dehiscence.   This does not cover E. Faecalis previously grown, but presumptively removed during sx.  -Will treat for additional 10 days  -Wound cxs taken in Podiatry clinic. Will follow results  -ID f/u next week w/ Podiatry

## 2020-07-14 ENCOUNTER — OFFICE VISIT (OUTPATIENT)
Dept: INFECTIOUS DISEASES | Facility: CLINIC | Age: 55
End: 2020-07-14
Payer: COMMERCIAL

## 2020-07-14 ENCOUNTER — LAB VISIT (OUTPATIENT)
Dept: LAB | Facility: HOSPITAL | Age: 55
End: 2020-07-14
Payer: COMMERCIAL

## 2020-07-14 ENCOUNTER — OFFICE VISIT (OUTPATIENT)
Dept: PODIATRY | Facility: CLINIC | Age: 55
End: 2020-07-14
Payer: COMMERCIAL

## 2020-07-14 VITALS
BODY MASS INDEX: 35.94 KG/M2 | HEART RATE: 55 BPM | SYSTOLIC BLOOD PRESSURE: 146 MMHG | WEIGHT: 280 LBS | HEIGHT: 74 IN | DIASTOLIC BLOOD PRESSURE: 87 MMHG

## 2020-07-14 VITALS
SYSTOLIC BLOOD PRESSURE: 135 MMHG | WEIGHT: 258.19 LBS | HEART RATE: 55 BPM | TEMPERATURE: 98 F | DIASTOLIC BLOOD PRESSURE: 84 MMHG | BODY MASS INDEX: 32.79 KG/M2

## 2020-07-14 DIAGNOSIS — A49.8 ANAEROBIC BACTERIAL INFECTION: ICD-10-CM

## 2020-07-14 DIAGNOSIS — L97.411 DIABETIC ULCER OF RIGHT MIDFOOT ASSOCIATED WITH DIABETES MELLITUS DUE TO UNDERLYING CONDITION, LIMITED TO BREAKDOWN OF SKIN: ICD-10-CM

## 2020-07-14 DIAGNOSIS — E08.621 DIABETIC ULCER OF RIGHT MIDFOOT ASSOCIATED WITH DIABETES MELLITUS DUE TO UNDERLYING CONDITION, LIMITED TO BREAKDOWN OF SKIN: ICD-10-CM

## 2020-07-14 DIAGNOSIS — S98.112A AMPUTATION OF LEFT GREAT TOE: ICD-10-CM

## 2020-07-14 DIAGNOSIS — E08.621 DIABETIC ULCER OF RIGHT MIDFOOT ASSOCIATED WITH DIABETES MELLITUS DUE TO UNDERLYING CONDITION, LIMITED TO BREAKDOWN OF SKIN: Primary | ICD-10-CM

## 2020-07-14 DIAGNOSIS — Z98.890 POST-OPERATIVE STATE: Primary | ICD-10-CM

## 2020-07-14 DIAGNOSIS — L97.411 DIABETIC ULCER OF RIGHT MIDFOOT ASSOCIATED WITH DIABETES MELLITUS DUE TO UNDERLYING CONDITION, LIMITED TO BREAKDOWN OF SKIN: Primary | ICD-10-CM

## 2020-07-14 LAB
BASOPHILS # BLD AUTO: 0.04 K/UL (ref 0–0.2)
BASOPHILS NFR BLD: 0.5 % (ref 0–1.9)
CRP SERPL-MCNC: 4.2 MG/L (ref 0–8.2)
DIFFERENTIAL METHOD: ABNORMAL
EOSINOPHIL # BLD AUTO: 0.2 K/UL (ref 0–0.5)
EOSINOPHIL NFR BLD: 2.6 % (ref 0–8)
ERYTHROCYTE [DISTWIDTH] IN BLOOD BY AUTOMATED COUNT: 12.6 % (ref 11.5–14.5)
ERYTHROCYTE [SEDIMENTATION RATE] IN BLOOD BY WESTERGREN METHOD: 45 MM/HR (ref 0–23)
HCT VFR BLD AUTO: 41.9 % (ref 40–54)
HGB BLD-MCNC: 13.7 G/DL (ref 14–18)
IMM GRANULOCYTES # BLD AUTO: 0.03 K/UL (ref 0–0.04)
IMM GRANULOCYTES NFR BLD AUTO: 0.4 % (ref 0–0.5)
LYMPHOCYTES # BLD AUTO: 1.7 K/UL (ref 1–4.8)
LYMPHOCYTES NFR BLD: 19.6 % (ref 18–48)
MCH RBC QN AUTO: 28.4 PG (ref 27–31)
MCHC RBC AUTO-ENTMCNC: 32.7 G/DL (ref 32–36)
MCV RBC AUTO: 87 FL (ref 82–98)
MONOCYTES # BLD AUTO: 0.7 K/UL (ref 0.3–1)
MONOCYTES NFR BLD: 8.6 % (ref 4–15)
NEUTROPHILS # BLD AUTO: 5.8 K/UL (ref 1.8–7.7)
NEUTROPHILS NFR BLD: 68.3 % (ref 38–73)
NRBC BLD-RTO: 0 /100 WBC
PLATELET # BLD AUTO: 353 K/UL (ref 150–350)
PMV BLD AUTO: 9 FL (ref 9.2–12.9)
RBC # BLD AUTO: 4.82 M/UL (ref 4.6–6.2)
WBC # BLD AUTO: 8.47 K/UL (ref 3.9–12.7)

## 2020-07-14 PROCEDURE — 99024 PR POST-OP FOLLOW-UP VISIT: ICD-10-PCS | Mod: S$GLB,,, | Performed by: PODIATRIST

## 2020-07-14 PROCEDURE — 99999 PR PBB SHADOW E&M-EST. PATIENT-LVL III: ICD-10-PCS | Mod: PBBFAC,,, | Performed by: PODIATRIST

## 2020-07-14 PROCEDURE — 99214 PR OFFICE/OUTPT VISIT, EST, LEVL IV, 30-39 MIN: ICD-10-PCS | Mod: S$GLB,,, | Performed by: PHYSICIAN ASSISTANT

## 2020-07-14 PROCEDURE — 99999 PR PBB SHADOW E&M-EST. PATIENT-LVL III: ICD-10-PCS | Mod: PBBFAC,,, | Performed by: PHYSICIAN ASSISTANT

## 2020-07-14 PROCEDURE — 85025 COMPLETE CBC W/AUTO DIFF WBC: CPT

## 2020-07-14 PROCEDURE — 99024 POSTOP FOLLOW-UP VISIT: CPT | Mod: S$GLB,,, | Performed by: PODIATRIST

## 2020-07-14 PROCEDURE — 99999 PR PBB SHADOW E&M-EST. PATIENT-LVL III: CPT | Mod: PBBFAC,,, | Performed by: PHYSICIAN ASSISTANT

## 2020-07-14 PROCEDURE — 99214 OFFICE O/P EST MOD 30 MIN: CPT | Mod: S$GLB,,, | Performed by: PHYSICIAN ASSISTANT

## 2020-07-14 PROCEDURE — 99999 PR PBB SHADOW E&M-EST. PATIENT-LVL III: CPT | Mod: PBBFAC,,, | Performed by: PODIATRIST

## 2020-07-14 PROCEDURE — 85652 RBC SED RATE AUTOMATED: CPT

## 2020-07-14 PROCEDURE — 87075 CULTR BACTERIA EXCEPT BLOOD: CPT

## 2020-07-14 PROCEDURE — 86140 C-REACTIVE PROTEIN: CPT

## 2020-07-14 PROCEDURE — 87070 CULTURE OTHR SPECIMN AEROBIC: CPT

## 2020-07-14 PROCEDURE — 36415 COLL VENOUS BLD VENIPUNCTURE: CPT

## 2020-07-14 NOTE — PROGRESS NOTES
Subjective:      Patient ID: Billy Sheffield Miguel is a 55 y.o. male.    Chief Complaint: Post-op Evaluation    Pt presents today s/p left hallux amputation. Pt is POD #10 POV#1. Pt states he has kept his post op bandage on and is staying off of it as much as possible. Pt is ambulating in a darco shoe. Pt denies any NVFCSOB.     Review of Systems   Constitution: Negative for chills, fever and malaise/fatigue.   HENT: Negative for hearing loss.    Cardiovascular: Positive for leg swelling. Negative for claudication.   Respiratory: Negative for shortness of breath.    Skin: Negative for flushing and rash.   Musculoskeletal: Negative for joint pain and myalgias.   Neurological: Positive for numbness, paresthesias and sensory change. Negative for loss of balance.   Psychiatric/Behavioral: Negative for altered mental status.           Objective:      Physical Exam  Cardiovascular:      Pulses:           Dorsalis pedis pulses are 1+ on the right side and 1+ on the left side.        Posterior tibial pulses are 1+ on the right side and 1+ on the left side.      Comments: Localized edema noted to surgical site  Musculoskeletal:      Comments: Left hallux amp   Skin:     General: Skin is warm.      Comments: Sutures intact with some dehiscence along entire course of incision.   Some fibrotic tissue forming along incision  Some localized erythema noted to dorsal foot about amp site, culture.    Neurological:      Mental Status: He is alert and oriented to person, place, and time.      Comments: Absent gross sensation b/L   Psychiatric:         Mood and Affect: Mood normal.               Assessment:       Encounter Diagnosis   Name Primary?    Post-operative state Yes         Plan:       Billy was seen today for post-op evaluation.    Diagnoses and all orders for this visit:    Post-operative state      I counseled the patient on his conditions, their implications and medical management.      Sutures left intact, swabbed with  betadine and iodosorb applied to a small fibrotic area  Culture taken, pt advised he will be notified of the results once they come in  Football dressing applied to pts left foot by Dhara Guerra MA under my direct supervision. Pt tolerated dressing well.   RTC in 1 week or sooner if any new pedal problems arise, any signs of infection occur, or if condition worsens.       .

## 2020-07-14 NOTE — LETTER
July 14, 2020      Zoila Qiu PA-C  [address] [facility] [address]           [facility] - Podiatry  [address]  [place] [address]  Phone: [phone]          Patient: Billy Rivera   MR Number: [id number]   YOB: 1965   Date of Visit: 7/14/2020       Dear Zoila Qiu:    Thank you for referring Billy Rivera to me for evaluation. Attached you will find relevant portions of my assessment and plan of care.    If you have questions, please do not hesitate to call me. I look forward to following Billy Rivera along with you.    Sincerely,    Bridget Santana, DAE    Enclosure  CC:  No Recipients    If you would like to receive this communication electronically, please contact [email or web address] or [phone] to request more information on [employer] access.    For providers and/or their staff who would like to refer a patient to [facility], please contact us through our one-stop-shop provider referral line, [facility] , at [phone].    If you feel you have received this communication in error or would no longer like to receive these types of communications, please e-mail [email or web address]

## 2020-07-15 RX ORDER — METRONIDAZOLE 500 MG/1
500 TABLET ORAL EVERY 8 HOURS
Qty: 30 TABLET | Refills: 0 | Status: SHIPPED | OUTPATIENT
Start: 2020-07-15 | End: 2020-07-25

## 2020-07-15 RX ORDER — DOXYCYCLINE 100 MG/1
100 CAPSULE ORAL 2 TIMES DAILY
Qty: 20 CAPSULE | Refills: 0 | Status: SHIPPED | OUTPATIENT
Start: 2020-07-15 | End: 2020-07-25

## 2020-07-16 ENCOUNTER — EXTERNAL HOME HEALTH (OUTPATIENT)
Dept: HOME HEALTH SERVICES | Facility: HOSPITAL | Age: 55
End: 2020-07-16
Payer: COMMERCIAL

## 2020-07-17 ENCOUNTER — DOCUMENT SCAN (OUTPATIENT)
Dept: HOME HEALTH SERVICES | Facility: HOSPITAL | Age: 55
End: 2020-07-17
Payer: COMMERCIAL

## 2020-07-18 LAB — BACTERIA SPEC AEROBE CULT: NO GROWTH

## 2020-07-21 ENCOUNTER — OFFICE VISIT (OUTPATIENT)
Dept: INTERNAL MEDICINE | Facility: CLINIC | Age: 55
End: 2020-07-21
Payer: COMMERCIAL

## 2020-07-21 ENCOUNTER — OFFICE VISIT (OUTPATIENT)
Dept: PODIATRY | Facility: CLINIC | Age: 55
End: 2020-07-21
Payer: COMMERCIAL

## 2020-07-21 VITALS
SYSTOLIC BLOOD PRESSURE: 142 MMHG | HEIGHT: 74 IN | BODY MASS INDEX: 33.5 KG/M2 | HEART RATE: 60 BPM | DIASTOLIC BLOOD PRESSURE: 83 MMHG | WEIGHT: 261 LBS

## 2020-07-21 VITALS
HEART RATE: 59 BPM | SYSTOLIC BLOOD PRESSURE: 124 MMHG | RESPIRATION RATE: 16 BRPM | BODY MASS INDEX: 33.53 KG/M2 | WEIGHT: 261.25 LBS | TEMPERATURE: 98 F | HEIGHT: 74 IN | DIASTOLIC BLOOD PRESSURE: 70 MMHG

## 2020-07-21 DIAGNOSIS — I15.2 HYPERTENSION ASSOCIATED WITH DIABETES: ICD-10-CM

## 2020-07-21 DIAGNOSIS — Z79.4 DIABETES MELLITUS DUE TO UNDERLYING CONDITION WITH HYPEROSMOLARITY WITHOUT COMA, WITH LONG-TERM CURRENT USE OF INSULIN: ICD-10-CM

## 2020-07-21 DIAGNOSIS — E08.00 DIABETES MELLITUS DUE TO UNDERLYING CONDITION WITH HYPEROSMOLARITY WITHOUT COMA, WITH LONG-TERM CURRENT USE OF INSULIN: ICD-10-CM

## 2020-07-21 DIAGNOSIS — E11.59 HYPERTENSION ASSOCIATED WITH DIABETES: ICD-10-CM

## 2020-07-21 DIAGNOSIS — Z98.890 POST-OPERATIVE STATE: Primary | ICD-10-CM

## 2020-07-21 DIAGNOSIS — T81.31XA DEHISCENCE OF OPERATIVE WOUND, INITIAL ENCOUNTER: ICD-10-CM

## 2020-07-21 DIAGNOSIS — M86.9 OSTEOMYELITIS OF LEFT FOOT, UNSPECIFIED TYPE: Primary | ICD-10-CM

## 2020-07-21 LAB — BACTERIA SPEC ANAEROBE CULT: NORMAL

## 2020-07-21 PROCEDURE — 99999 PR PBB SHADOW E&M-EST. PATIENT-LVL III: ICD-10-PCS | Mod: PBBFAC,,, | Performed by: PODIATRIST

## 2020-07-21 PROCEDURE — 99213 OFFICE O/P EST LOW 20 MIN: CPT | Mod: S$GLB,,, | Performed by: INTERNAL MEDICINE

## 2020-07-21 PROCEDURE — 99213 PR OFFICE/OUTPT VISIT, EST, LEVL III, 20-29 MIN: ICD-10-PCS | Mod: S$GLB,,, | Performed by: INTERNAL MEDICINE

## 2020-07-21 PROCEDURE — 99024 PR POST-OP FOLLOW-UP VISIT: ICD-10-PCS | Mod: S$GLB,,, | Performed by: PODIATRIST

## 2020-07-21 PROCEDURE — 99999 PR PBB SHADOW E&M-EST. PATIENT-LVL III: CPT | Mod: PBBFAC,,, | Performed by: PODIATRIST

## 2020-07-21 PROCEDURE — 99999 PR PBB SHADOW E&M-EST. PATIENT-LVL IV: CPT | Mod: PBBFAC,,, | Performed by: INTERNAL MEDICINE

## 2020-07-21 PROCEDURE — 99024 POSTOP FOLLOW-UP VISIT: CPT | Mod: S$GLB,,, | Performed by: PODIATRIST

## 2020-07-21 PROCEDURE — 99999 PR PBB SHADOW E&M-EST. PATIENT-LVL IV: ICD-10-PCS | Mod: PBBFAC,,, | Performed by: INTERNAL MEDICINE

## 2020-07-21 NOTE — LETTER
July 21, 2020      Zoila Qiu PA-C  [address] [facility] [address]           [facility] - Podiatry  [address]  [place] [address]  Phone: [phone]          Patient: Billy Rivera   MR Number: [id number]   YOB: 1965   Date of Visit: 7/21/2020       Dear Zoila Qiu:    Thank you for referring Billy Rivera to me for evaluation. Attached you will find relevant portions of my assessment and plan of care.    If you have questions, please do not hesitate to call me. I look forward to following Billy Rivera along with you.    Sincerely,    Bridget Santana, DAE    Enclosure  CC:  No Recipients    If you would like to receive this communication electronically, please contact [email or web address] or [phone] to request more information on [employer] access.    For providers and/or their staff who would like to refer a patient to [facility], please contact us through our one-stop-shop provider referral line, [facility] , at [phone].    If you feel you have received this communication in error or would no longer like to receive these types of communications, please e-mail [email or web address]

## 2020-07-21 NOTE — PROGRESS NOTES
Transitional Care Note  Subjective:       Patient ID: Billy Rivera is a 55 y.o. male.  Chief Complaint: No chief complaint on file.    Family and/or Caretaker present at visit?  No.  Diagnostic tests reviewed/disposition: I have reviewed all completed as well as pending diagnostic tests at the time of discharge.  Disease/illness education: Osteomyelitis  Home health/community services discussion/referrals: Patient has home health established at [facility].   Establishment or re-establishment of referral orders for community resources: No other necessary community resources.   Discussion with other health care providers: No discussion with other health care providers necessary.   CC: followup of hospitalization for osteomyelitis of left great toe  HPI:  The patient is a 55 y.o. year old male who presents to the office for followup of hospitalization for osteomyelitis of left great toe.  He presents to the emergency department for evaluation of toe infection.  He reported increased swelling in left lower extremity and redness spreading up his left foot and lower leg.  He had undergone a bone biopsy by Podiatry on July 4th.  He was started on Cipro at that time.  Culture was growing Enterococcus faecalis.  There is also started on IV vancomycin.  He denied any pain, but reported drainage.  Podiatry was consulted in and be to his toe with clear margins.  He tolerated procedure well.  Glucose was elevated during hospitalization.  Infectious disease was consulted, and vancomycin was switched to oral linezolid for 7 days.  He has subsequently started on flagyl and doxycycline by infectious disease due to wound dehiscence.  He reports blood sugars have improved somewhat.    PAST MEDICAL HISTORY:  Past Medical History:  No date: Allergy  6/25/2013: CAD (coronary artery disease), native coronary artery  No date: Cancer  No date: Diabetes mellitus  No date: Diabetes mellitus, type 2  No date: Disorder of kidney and  ureter  04/2012: Heart attack  No date: Hx of colon cancer, stage I  No date: Hyperlipidemia  No date: Hypertension  No date: Muscular pain      Comment:  post-op after colonoscopy  No date: MICHAELA (obstructive sleep apnea)    SURGICAL HISTORY:  Past Surgical History:  2/17/2017: COLONOSCOPY; N/A      Comment:  Procedure: COLONOSCOPY;  Surgeon: Simon Gomez MD;                 Location: [facility] ENDO (4TH FLR);  Service: Endoscopy;                 Laterality: N/A;  No date: CORONARY ANGIOPLASTY  6/5/2018: INCISION AND DRAINAGE FOOT; Right      Comment:  Procedure: INCISION AND DRAINAGE, FOOT;  Surgeon:                Bridget Santana DPM;  Location: [facility] OR 1ST FLR;                 Service: Podiatry;  Laterality: Right;  Request mini C                arm in room. request wound vac with medium black sponge  6/7/2018: INCISION AND DRAINAGE FOOT; Right      Comment:  Procedure: INCISION AND DRAINAGE, FOOT;  Surgeon: Emelia Brock DPM;  Location: [facility] OR 2ND FLR;  Service:                Podiatry;  Laterality: Right;  6/2/2018: INCISION AND DRAINAGE OF ABSCESS; Right      Comment:  Procedure: INCISION AND DRAINAGE, ABSCESS;  Surgeon:                Bridget Santana DPM;  Location: [facility] OR 2ND FLR;                 Service: General;  Laterality: Right;  6/7/2018: REMOVAL OF FOREIGN BODY FROM FOOT; Right      Comment:  Procedure: REMOVAL, FOREIGN BODY, FOOT;  Surgeon: Emelia Brock DPM;  Location: [facility] OR 2ND FLR;  Service:                Podiatry;  Laterality: Right;  7/4/2020: TOE AMPUTATION; Left      Comment:  Procedure: AMPUTATION, TOE;  Surgeon: Bridget Santana DPM;  Location: [facility] OR 2ND FLR;  Service: Podiatry;                 Laterality: Left;  No date: TONSILLECTOMY    MEDS:  Medcard reviewed and updated    ALLERGIES: Allergy Card reviewed and updated    SOCIAL HISTORY:   The patient is a nonsmoker.        Review of Systems   Constitutional: Negative for chills and  fever.   Respiratory: Negative for chest tightness, shortness of breath and wheezing.    Cardiovascular: Negative for chest pain, palpitations and leg swelling.   Gastrointestinal: Negative for abdominal pain, diarrhea, nausea and vomiting.   Musculoskeletal: Negative for arthralgias.   Neurological: Negative for headaches.   Psychiatric/Behavioral: Negative for sleep disturbance.       Objective:      Physical Exam  Constitutional:       Appearance: Normal appearance. He is obese.   Cardiovascular:      Rate and Rhythm: Normal rate and regular rhythm.   Pulmonary:      Effort: Pulmonary effort is normal.      Breath sounds: Normal breath sounds.   Abdominal:      General: Bowel sounds are normal.      Palpations: Abdomen is soft.   Musculoskeletal:         General: No swelling.   Skin:     General: Skin is warm and dry.      Comments: Left great toe amputation.  Dressing intact.   Neurological:      Mental Status: He is alert and oriented to person, place, and time.   Psychiatric:         Mood and Affect: Mood normal.         Behavior: Behavior normal.         Thought Content: Thought content normal.         Judgment: Judgment normal.         Assessment:       1. Osteomyelitis of left foot, unspecified type    2. Diabetes mellitus due to underlying condition with hyperosmolarity without coma, with long-term current use of insulin    3. Hypertension associated with diabetes        Plan:     Billy was seen today for hospital follow up.    Diagnoses and all orders for this visit:    Osteomyelitis of left foot, unspecified type  -     Follow up with podiatry and infectious disease    Diabetes mellitus due to underlying condition with hyperosmolarity without coma, with long-term current use of insulin  -     Continue current regimen  -     Discuss at upcoming visit    Hypertension associated with diabetes  -     Blood pressure is well controlled

## 2020-07-21 NOTE — PROGRESS NOTES
Subjective:      Patient ID: Billy Sheffield Miguel is a 55 y.o. male.    Chief Complaint: Post-op Evaluation    Pt presents today s/p left hallux amputation. Pt is POD #10 POV#1. Pt states he has kept his post op bandage on and is staying off of it as much as possible. Pt is ambulating in a darco shoe. Pt denies any NVFCSOB.     Review of Systems   Constitution: Negative for chills, fever and malaise/fatigue.   HENT: Negative for hearing loss.    Cardiovascular: Positive for leg swelling. Negative for claudication.   Respiratory: Negative for shortness of breath.    Skin: Negative for flushing and rash.   Musculoskeletal: Negative for joint pain and myalgias.   Neurological: Positive for numbness, paresthesias and sensory change. Negative for loss of balance.   Psychiatric/Behavioral: Negative for altered mental status.           Objective:      Physical Exam  Cardiovascular:      Pulses:           Dorsalis pedis pulses are 1+ on the right side and 1+ on the left side.        Posterior tibial pulses are 1+ on the right side and 1+ on the left side.      Comments: Localized edema noted to surgical site  Musculoskeletal:      Comments: Left hallux amp   Skin:     General: Skin is warm.      Comments: Sutures intact with some dehiscence along entire course of incision.   Some fibrotic tissue forming along incision  Erythema resolved.    Neurological:      Mental Status: He is alert and oriented to person, place, and time.      Comments: Absent gross sensation b/L   Psychiatric:         Mood and Affect: Mood normal.               Assessment:       Encounter Diagnoses   Name Primary?    Post-operative state Yes    Dehiscence of operative wound, initial encounter          Plan:       Billy was seen today for post-op evaluation.    Diagnoses and all orders for this visit:    Post-operative state    Dehiscence of operative wound, initial encounter      I counseled the patient on his conditions, their implications and  medical management.      Sutures removed w/o incident. Incision site debrided of fibrotic tissue.   hydrofera blue applied to incision  No bacteria growth noted from cultures/S  Football dressing applied to pts left foot by Dhara Guerra MA under my direct supervision. Pt tolerated dressing well.   RTC in 1 week or sooner if any new pedal problems arise, any signs of infection occur, or if condition worsens.       .

## 2020-07-28 ENCOUNTER — OFFICE VISIT (OUTPATIENT)
Dept: PODIATRY | Facility: CLINIC | Age: 55
End: 2020-07-28
Payer: COMMERCIAL

## 2020-07-28 VITALS
WEIGHT: 261 LBS | HEART RATE: 18 BPM | BODY MASS INDEX: 33.5 KG/M2 | DIASTOLIC BLOOD PRESSURE: 74 MMHG | TEMPERATURE: 97 F | HEIGHT: 74 IN | RESPIRATION RATE: 18 BRPM | SYSTOLIC BLOOD PRESSURE: 118 MMHG

## 2020-07-28 DIAGNOSIS — Z98.890 POST-OPERATIVE STATE: ICD-10-CM

## 2020-07-28 DIAGNOSIS — T81.31XD DEHISCENCE OF OPERATIVE WOUND, SUBSEQUENT ENCOUNTER: Primary | ICD-10-CM

## 2020-07-28 PROCEDURE — 99024 POSTOP FOLLOW-UP VISIT: CPT | Mod: S$GLB,,, | Performed by: PODIATRIST

## 2020-07-28 PROCEDURE — 87075 CULTR BACTERIA EXCEPT BLOOD: CPT

## 2020-07-28 PROCEDURE — 99024 PR POST-OP FOLLOW-UP VISIT: ICD-10-PCS | Mod: S$GLB,,, | Performed by: PODIATRIST

## 2020-07-28 PROCEDURE — 99999 PR PBB SHADOW E&M-EST. PATIENT-LVL III: ICD-10-PCS | Mod: PBBFAC,,, | Performed by: PODIATRIST

## 2020-07-28 PROCEDURE — 99999 PR PBB SHADOW E&M-EST. PATIENT-LVL III: CPT | Mod: PBBFAC,,, | Performed by: PODIATRIST

## 2020-07-28 PROCEDURE — 87147 CULTURE TYPE IMMUNOLOGIC: CPT

## 2020-07-28 PROCEDURE — 87070 CULTURE OTHR SPECIMN AEROBIC: CPT

## 2020-07-29 ENCOUNTER — TELEPHONE (OUTPATIENT)
Dept: PODIATRY | Facility: CLINIC | Age: 55
End: 2020-07-29

## 2020-07-29 NOTE — TELEPHONE ENCOUNTER
Spoke with HHN, questioning if HH still indicated as pt has been having football changes weekly in clinic.  Will check with DPM and let HHN know status of HH

## 2020-07-29 NOTE — TELEPHONE ENCOUNTER
----- Message from Tracy Low LPN sent at 7/29/2020  4:15 PM CDT -----  Regarding: FW: ORDERS  [facility]  Please advise  ----- Message -----  From: Katie Foreman  Sent: 7/29/2020   2:15 PM CDT  To: Tracie VAUGHN Staff  Subject: ORDERS                                           Dr Tracie [facility]DEZ [facility] CARE ask for a call to confirm orders for wound care     Contact info  [phone]

## 2020-07-29 NOTE — PROGRESS NOTES
"  Subjective:      Patient ID: Billy Sheffield Miguel is a 55 y.o. male.    Chief Complaint: Post-op Evaluation    Pt presents today s/p left hallux amputation. Pt is POD #10 POV#1. Pt states he has kept his post op bandage on and is staying off of it as much as possible. Pt is ambulating in a darco shoe. Pt denies any NVFCSOB.     7.29.20- Patient has been in football dressing, ambulating in Darco shoe x 1 week with out issues     Review of Systems   Constitution: Negative for chills, fever and malaise/fatigue.   HENT: Negative for hearing loss.    Cardiovascular: Positive for leg swelling. Negative for claudication.   Respiratory: Negative for shortness of breath.    Skin: Negative for flushing and rash.   Musculoskeletal: Negative for joint pain and myalgias.   Neurological: Positive for numbness, paresthesias and sensory change. Negative for loss of balance.   Psychiatric/Behavioral: Negative for altered mental status.           Objective:       Vitals:    07/28/20 1516   BP: 118/74   Pulse: (!) 18   Resp: 18   Temp: 96.8 °F (36 °C)   TempSrc: Axillary   Weight: 118.4 kg (261 lb)   Height: 6' 2" (1.88 m)   PainSc: 0-No pain        Physical Exam  Vitals signs reviewed.   Cardiovascular:      Pulses:           Dorsalis pedis pulses are 1+ on the right side and 1+ on the left side.        Posterior tibial pulses are 1+ on the right side and 1+ on the left side.      Comments: Localized edema noted to surgical site  Musculoskeletal:      Comments: Left hallux amp   Skin:     General: Skin is warm.      Comments: Ulcer location:  left, hallux amp site  W/ granular/fibrotic base. + oralia wound maceration   Neurological:      Mental Status: He is alert and oriented to person, place, and time.      Comments: Absent gross sensation b/L   Psychiatric:         Mood and Affect: Mood normal.                   Assessment:       Encounter Diagnoses   Name Primary?    Dehiscence of operative wound, subsequent encounter Yes    " Post-operative state          Plan:       Billy was seen today for post-op evaluation.    Diagnoses and all orders for this visit:    Dehiscence of operative wound, subsequent encounter    Post-operative state  -     Aerobic culture  -     Culture, Anaerobic      I counseled the patient on his conditions, their implications and medical management.    Debridement: With verbal consent, nonviable tissues on the left foot were debrided beyond sub q utilizing a  sterile No. 3 scalpel and forceps. Minimal bleeding controlled with direct pressure  The patient tolerated this well.     Dressings: Iodosorb w/ alginate  Offloading:Ibis Natarajan MA assisted w/ application of football dressing under my direct supervision. Darco shoe applied to offload the area. Advised patient that this should be worn on the affected foot at all times when ambulating     Follow-up:Patient is to return to the clinic in 1 week  for follow-up but should call [facility] immediately if any signs of infection, such as fever, chills, sweats, increased redness or pain.    Short-term goals include maintaining good offloading and minimizing bioburden, promoting granulation and epithelialization to healing.  Long-term goals include keeping the wound healed by good offloading and medical management under the direction of internist.

## 2020-07-30 ENCOUNTER — PATIENT MESSAGE (OUTPATIENT)
Dept: PODIATRY | Facility: CLINIC | Age: 55
End: 2020-07-30

## 2020-07-30 LAB — BACTERIA SPEC AEROBE CULT: ABNORMAL

## 2020-07-30 RX ORDER — CLINDAMYCIN HYDROCHLORIDE 300 MG/1
300 CAPSULE ORAL EVERY 8 HOURS
Qty: 30 CAPSULE | Refills: 0 | Status: SHIPPED | OUTPATIENT
Start: 2020-07-30 | End: 2020-08-09

## 2020-08-03 LAB — BACTERIA SPEC ANAEROBE CULT: NORMAL

## 2020-08-04 ENCOUNTER — OFFICE VISIT (OUTPATIENT)
Dept: PODIATRY | Facility: CLINIC | Age: 55
End: 2020-08-04
Payer: COMMERCIAL

## 2020-08-04 VITALS
WEIGHT: 261 LBS | DIASTOLIC BLOOD PRESSURE: 76 MMHG | HEIGHT: 74 IN | HEART RATE: 58 BPM | BODY MASS INDEX: 33.5 KG/M2 | SYSTOLIC BLOOD PRESSURE: 114 MMHG

## 2020-08-04 DIAGNOSIS — T81.31XD DEHISCENCE OF OPERATIVE WOUND, SUBSEQUENT ENCOUNTER: Primary | ICD-10-CM

## 2020-08-04 DIAGNOSIS — Z98.890 POST-OPERATIVE STATE: ICD-10-CM

## 2020-08-04 PROCEDURE — 99024 POSTOP FOLLOW-UP VISIT: CPT | Mod: S$GLB,,, | Performed by: PODIATRIST

## 2020-08-04 PROCEDURE — 99499 NO LOS: ICD-10-PCS | Mod: S$GLB,,, | Performed by: PODIATRIST

## 2020-08-04 PROCEDURE — 99999 PR PBB SHADOW E&M-EST. PATIENT-LVL III: ICD-10-PCS | Mod: PBBFAC,,, | Performed by: PODIATRIST

## 2020-08-04 PROCEDURE — 99499 UNLISTED E&M SERVICE: CPT | Mod: S$GLB,,, | Performed by: PODIATRIST

## 2020-08-04 PROCEDURE — 99999 PR PBB SHADOW E&M-EST. PATIENT-LVL III: CPT | Mod: PBBFAC,,, | Performed by: PODIATRIST

## 2020-08-04 PROCEDURE — 99024 PR POST-OP FOLLOW-UP VISIT: ICD-10-PCS | Mod: S$GLB,,, | Performed by: PODIATRIST

## 2020-08-04 NOTE — LETTER
August 4, 2020      Zoila Qiu PA-C  [address] [facility] [address]           [facility] - Podiatry  [address]  [place] [address]  Phone: [phone]          Patient: Billy Rivera   MR Number: [id number]   YOB: 1965   Date of Visit: 8/4/2020       Dear Zoila Qiu:    Thank you for referring Billy Rivera to me for evaluation. Attached you will find relevant portions of my assessment and plan of care.    If you have questions, please do not hesitate to call me. I look forward to following Billy Rivera along with you.    Sincerely,    Bridget Santana, DAE    Enclosure  CC:  No Recipients    If you would like to receive this communication electronically, please contact [email or web address] or [phone] to request more information on [employer] Link access.    For providers and/or their staff who would like to refer a patient to [facility], please contact us through our one-stop-shop provider referral line, [facility] , at [phone].    If you feel you have received this communication in error or would no longer like to receive these types of communications, please e-mail [email or web address]

## 2020-08-04 NOTE — PROGRESS NOTES
"  Subjective:      Patient ID: Billy Rivera is a 55 y.o. male.    Chief Complaint: Diabetes Mellitus and Follow-up (wound f/u no pain complaint)    Pt presents today s/p left hallux amputation. Pt is POD #10 POV#1. Pt states he has kept his post op bandage on and is staying off of it as much as possible. Pt is ambulating in a darco shoe. Pt denies any NVFCSOB.     7.29.20- Patient has been in football dressing, ambulating in Darco shoe x 1 week with out issues     Review of Systems   Constitution: Negative for chills, fever and malaise/fatigue.   HENT: Negative for hearing loss.    Cardiovascular: Positive for leg swelling. Negative for claudication.   Respiratory: Negative for shortness of breath.    Skin: Negative for flushing and rash.   Musculoskeletal: Negative for joint pain and myalgias.   Neurological: Positive for numbness, paresthesias and sensory change. Negative for loss of balance.   Psychiatric/Behavioral: Negative for altered mental status.           Objective:       Vitals:    08/04/20 0847   BP: 114/76   Pulse: (!) 58   Weight: 118.4 kg (261 lb 0.4 oz)   Height: 6' 2" (1.88 m)   PainSc: 0-No pain        Physical Exam  Vitals signs reviewed.   Cardiovascular:      Pulses:           Dorsalis pedis pulses are 1+ on the right side and 1+ on the left side.        Posterior tibial pulses are 1+ on the right side and 1+ on the left side.      Comments: Localized edema noted to surgical site  Musculoskeletal:      Comments: Left hallux amp   Skin:     General: Skin is warm.      Comments: Ulcer location:  left, hallux amp site  W/ granular/fibrotic base. + oralia wound maceration  No purulence   Neurological:      Mental Status: He is alert and oriented to person, place, and time.      Comments: Absent gross sensation b/L   Psychiatric:         Mood and Affect: Mood normal.                   Assessment:       Encounter Diagnoses   Name Primary?    Dehiscence of operative wound, subsequent encounter " Yes    Post-operative state          Plan:       Billy was seen today for diabetes mellitus and follow-up.    Diagnoses and all orders for this visit:    Dehiscence of operative wound, subsequent encounter    Post-operative state      I counseled the patient on his conditions, their implications and medical management.    Debridement: With verbal consent, nonviable tissues on the left foot were debrided beyond sub q utilizing a  sterile No. 3 scalpel and forceps. Minimal bleeding controlled with direct pressure  The patient tolerated this well.     Dressings: Iodosorb w/ alginate  Offloading:Foam     MA assisted w/ application of football dressing under my direct supervision. Darco shoe applied to offload the area. Advised patient that this should be worn on the affected foot at all times when ambulating     Follow-up:Patient is to return to the clinic in 1 week  for follow-up but should call [facility] immediately if any signs of infection, such as fever, chills, sweats, increased redness or pain.    Short-term goals include maintaining good offloading and minimizing bioburden, promoting granulation and epithelialization to healing.  Long-term goals include keeping the wound healed by good offloading and medical management under the direction of internist.

## 2020-08-05 LAB — FUNGUS SPEC CULT: NORMAL

## 2020-08-10 ENCOUNTER — DOCUMENT SCAN (OUTPATIENT)
Dept: HOME HEALTH SERVICES | Facility: HOSPITAL | Age: 55
End: 2020-08-10
Payer: COMMERCIAL

## 2020-08-11 ENCOUNTER — TELEPHONE (OUTPATIENT)
Dept: INTERNAL MEDICINE | Facility: CLINIC | Age: 55
End: 2020-08-11

## 2020-08-11 ENCOUNTER — OFFICE VISIT (OUTPATIENT)
Dept: INTERNAL MEDICINE | Facility: CLINIC | Age: 55
End: 2020-08-11
Payer: COMMERCIAL

## 2020-08-11 ENCOUNTER — OFFICE VISIT (OUTPATIENT)
Dept: PODIATRY | Facility: CLINIC | Age: 55
End: 2020-08-11
Payer: COMMERCIAL

## 2020-08-11 VITALS
BODY MASS INDEX: 33.73 KG/M2 | TEMPERATURE: 98 F | DIASTOLIC BLOOD PRESSURE: 70 MMHG | SYSTOLIC BLOOD PRESSURE: 122 MMHG | WEIGHT: 262.81 LBS | RESPIRATION RATE: 14 BRPM | HEART RATE: 76 BPM | HEIGHT: 74 IN

## 2020-08-11 VITALS — SYSTOLIC BLOOD PRESSURE: 122 MMHG | DIASTOLIC BLOOD PRESSURE: 77 MMHG | HEART RATE: 62 BPM

## 2020-08-11 DIAGNOSIS — Z85.038 HX OF COLON CANCER, STAGE I: ICD-10-CM

## 2020-08-11 DIAGNOSIS — E11.9 DIABETES MELLITUS WITH INSULIN THERAPY: ICD-10-CM

## 2020-08-11 DIAGNOSIS — I73.9 PVD (PERIPHERAL VASCULAR DISEASE): ICD-10-CM

## 2020-08-11 DIAGNOSIS — E11.51 DIABETES MELLITUS WITH PERIPHERAL CIRCULATORY DISORDER: ICD-10-CM

## 2020-08-11 DIAGNOSIS — Z00.00 ENCOUNTER FOR PREVENTIVE HEALTH EXAMINATION: Primary | ICD-10-CM

## 2020-08-11 DIAGNOSIS — I25.10 CORONARY ARTERY DISEASE INVOLVING NATIVE CORONARY ARTERY OF NATIVE HEART WITHOUT ANGINA PECTORIS: ICD-10-CM

## 2020-08-11 DIAGNOSIS — E11.69 DYSLIPIDEMIA ASSOCIATED WITH TYPE 2 DIABETES MELLITUS: ICD-10-CM

## 2020-08-11 DIAGNOSIS — E78.5 DYSLIPIDEMIA ASSOCIATED WITH TYPE 2 DIABETES MELLITUS: ICD-10-CM

## 2020-08-11 DIAGNOSIS — E11.59 HYPERTENSION ASSOCIATED WITH DIABETES: ICD-10-CM

## 2020-08-11 DIAGNOSIS — T81.31XD DEHISCENCE OF OPERATIVE WOUND, SUBSEQUENT ENCOUNTER: Primary | ICD-10-CM

## 2020-08-11 DIAGNOSIS — Z79.4 DIABETES MELLITUS WITH INSULIN THERAPY: ICD-10-CM

## 2020-08-11 DIAGNOSIS — I15.2 HYPERTENSION ASSOCIATED WITH DIABETES: ICD-10-CM

## 2020-08-11 DIAGNOSIS — G47.30 SLEEP APNEA, UNSPECIFIED TYPE: ICD-10-CM

## 2020-08-11 PROCEDURE — 99499 UNLISTED E&M SERVICE: CPT | Mod: S$GLB,,, | Performed by: PODIATRIST

## 2020-08-11 PROCEDURE — 99999 PR PBB SHADOW E&M-EST. PATIENT-LVL III: ICD-10-PCS | Mod: PBBFAC,,, | Performed by: PODIATRIST

## 2020-08-11 PROCEDURE — 99024 POSTOP FOLLOW-UP VISIT: CPT | Mod: S$GLB,,, | Performed by: PODIATRIST

## 2020-08-11 PROCEDURE — 99024 PR POST-OP FOLLOW-UP VISIT: ICD-10-PCS | Mod: S$GLB,,, | Performed by: PODIATRIST

## 2020-08-11 PROCEDURE — 99499 NO LOS: ICD-10-PCS | Mod: S$GLB,,, | Performed by: PODIATRIST

## 2020-08-11 PROCEDURE — 99999 PR PBB SHADOW E&M-EST. PATIENT-LVL IV: ICD-10-PCS | Mod: PBBFAC,,, | Performed by: INTERNAL MEDICINE

## 2020-08-11 PROCEDURE — 99396 PR PREVENTIVE VISIT,EST,40-64: ICD-10-PCS | Mod: S$PBB,,, | Performed by: INTERNAL MEDICINE

## 2020-08-11 PROCEDURE — 99999 PR PBB SHADOW E&M-EST. PATIENT-LVL III: CPT | Mod: PBBFAC,,, | Performed by: PODIATRIST

## 2020-08-11 PROCEDURE — 99999 PR PBB SHADOW E&M-EST. PATIENT-LVL IV: CPT | Mod: PBBFAC,,, | Performed by: INTERNAL MEDICINE

## 2020-08-11 PROCEDURE — 99396 PREV VISIT EST AGE 40-64: CPT | Mod: S$PBB,,, | Performed by: INTERNAL MEDICINE

## 2020-08-11 NOTE — TELEPHONE ENCOUNTER
----- Message from Shama Mo sent at 8/11/2020  9:42 AM CDT -----  Contact: Pt [phone]  Patient will be running a few mins late for his appt.    Please call and advise.    Thank You

## 2020-08-11 NOTE — PROGRESS NOTES
History of present illness:  He 55-year-old male in today for general health assessment and following up on several chronic medical conditions.    Current medications:  All medications are noted and reviewed in the electronic medical record medication list.    Review of systems:  General: no fever, chills, generalized body aches. No unexpected weight loss.  Eyes:  No visual disturbances.  HEENT:  No hoarseness, dysphagia, ear pain.  Respiratory:  No cough, no shortness of breath.  Cardiovascular: no chest pain, palpitations, cough, exertional limb pain. No edema.  GI: no nausea, vomiting.  No abdominal pain. No change in bowel habits.  No melena, no hematochezia.  : no dysuria. No change in the color or character of the urine. No urinary frequency.  Musculoskeletal: no joint pain or swelling.  Recent surgical amputation of the left 1st toe by Podiatry for persistent ulcer and osteomyelitis  Neurologic:  No new focal neurological complaints.  No headaches.  Skin:  No rashes or other concerns.  Psych:  No emotional issues    Past medical history, past surgical history, family medical history social history is are all noted and reviewed in the electronic medical record history sections.    Health screenings:  Colonoscopy 2017.  He has had both pneumococcal vaccines.    Physical examination:  GENERAL:  Alert, appropriately groomed, no acute distress.  VS:  Blood pressure taken manually is 122/70.  EYES: sclerae white ,nonicteric. PERRL.  HEENT:  Normocephalic. Ear canals and tympanic membranes normal. Mouth and pharynx normal. No thyromegaly. Trachea midline and freely mobile.  LUNGS:  Clear to ascultation and normal to percussion.  CARDIOVASCULAR:  Normal heart sounds.  No significant murmur. Carotids full bilaterally without bruit.  Pedal pulses intact .  No abdominal bruit.  No peripheral extremity edema.  GI: the abdomen is soft, no distension. No masses , tenderness, organomegaly.  Rectal examination normal.  :  scrotum, testicles and penis normal. Prostate without nodules or asymmetry.  LYMPHATIC:  No axillary, inguinal , cervical adenopathy.  MUSCULOSKELETAL:  Range of motion, stability and strength of the right and left upper and lower extremities normal. No swollen or tender joints .  Left toe surgically absent.  NEUROLOGIC:  DTR's normal. No gross motor deficits  Apparent. gait normal.  SKIN:  No rashes.   MS:  Alert, oriented , affect and mood all appropriate      Impression:  [age] year male with several chronic medical conditions.  Hypertension controlled.  Type 2 insulin controlled diabetes mellitus due for update.  Dyslipidemia on statin therapy.  DARVIN BARRY  CAD clinically stable.  History of colon cancer up-to-date with surveillance colonoscopy.  Obstructive sleep apnea.    Plan:  Update health maintenance laboratory data to include CBC, chemistry profile, lipid profile TSH, PSA, urinalysis, urine for microalbumin/creatinine ratio.  Return clinic 6 months follow-up.

## 2020-08-11 NOTE — LETTER
August 17, 2020      Zoila Qiu PA-C  [address] [facility] [address]           [facility] - Podiatry  [address]  [place] [address]  Phone: [phone]          Patient: Billy Rivera   MR Number: [id number]   YOB: 1965   Date of Visit: 8/11/2020       Dear Zoila Qiu:    Thank you for referring Billy Rivera to me for evaluation. Attached you will find relevant portions of my assessment and plan of care.    If you have questions, please do not hesitate to call me. I look forward to following Billy Rivera along with you.    Sincerely,    Bridget Santana, DAE    Enclosure  CC:  No Recipients    If you would like to receive this communication electronically, please contact [email or web address] or [phone] to request more information on [employer] Link access.    For providers and/or their staff who would like to refer a patient to [facility], please contact us through our one-stop-shop provider referral line, [facility] , at [phone].    If you feel you have received this communication in error or would no longer like to receive these types of communications, please e-mail [email or web address]

## 2020-08-12 ENCOUNTER — LAB VISIT (OUTPATIENT)
Dept: LAB | Facility: HOSPITAL | Age: 55
End: 2020-08-12
Attending: INTERNAL MEDICINE
Payer: COMMERCIAL

## 2020-08-12 DIAGNOSIS — Z79.4 DIABETES MELLITUS WITH INSULIN THERAPY: ICD-10-CM

## 2020-08-12 DIAGNOSIS — Z00.00 ENCOUNTER FOR PREVENTIVE HEALTH EXAMINATION: ICD-10-CM

## 2020-08-12 DIAGNOSIS — E11.9 DIABETES MELLITUS WITH INSULIN THERAPY: ICD-10-CM

## 2020-08-12 DIAGNOSIS — E78.5 DYSLIPIDEMIA ASSOCIATED WITH TYPE 2 DIABETES MELLITUS: ICD-10-CM

## 2020-08-12 DIAGNOSIS — I15.2 HYPERTENSION ASSOCIATED WITH DIABETES: ICD-10-CM

## 2020-08-12 DIAGNOSIS — E11.69 DYSLIPIDEMIA ASSOCIATED WITH TYPE 2 DIABETES MELLITUS: ICD-10-CM

## 2020-08-12 DIAGNOSIS — E11.59 HYPERTENSION ASSOCIATED WITH DIABETES: ICD-10-CM

## 2020-08-12 LAB
ALBUMIN SERPL BCP-MCNC: 3.6 G/DL (ref 3.5–5.2)
ALP SERPL-CCNC: 101 U/L (ref 55–135)
ALT SERPL W/O P-5'-P-CCNC: 20 U/L (ref 10–44)
ANION GAP SERPL CALC-SCNC: 6 MMOL/L (ref 8–16)
AST SERPL-CCNC: 15 U/L (ref 10–40)
BASOPHILS # BLD AUTO: 0.07 K/UL (ref 0–0.2)
BASOPHILS NFR BLD: 1 % (ref 0–1.9)
BILIRUB SERPL-MCNC: 0.7 MG/DL (ref 0.1–1)
BUN SERPL-MCNC: 17 MG/DL (ref 6–20)
CALCIUM SERPL-MCNC: 9.2 MG/DL (ref 8.7–10.5)
CHLORIDE SERPL-SCNC: 101 MMOL/L (ref 95–110)
CHOLEST SERPL-MCNC: 144 MG/DL (ref 120–199)
CHOLEST/HDLC SERPL: 3.7 {RATIO} (ref 2–5)
CO2 SERPL-SCNC: 28 MMOL/L (ref 23–29)
COMPLEXED PSA SERPL-MCNC: 0.29 NG/ML (ref 0–4)
CREAT SERPL-MCNC: 1 MG/DL (ref 0.5–1.4)
DIFFERENTIAL METHOD: ABNORMAL
EOSINOPHIL # BLD AUTO: 0.2 K/UL (ref 0–0.5)
EOSINOPHIL NFR BLD: 2.6 % (ref 0–8)
ERYTHROCYTE [DISTWIDTH] IN BLOOD BY AUTOMATED COUNT: 14.3 % (ref 11.5–14.5)
EST. GFR  (AFRICAN AMERICAN): >60 ML/MIN/1.73 M^2
EST. GFR  (NON AFRICAN AMERICAN): >60 ML/MIN/1.73 M^2
ESTIMATED AVG GLUCOSE: 283 MG/DL (ref 68–131)
GLUCOSE SERPL-MCNC: 236 MG/DL (ref 70–110)
HBA1C MFR BLD HPLC: 11.5 % (ref 4–5.6)
HCT VFR BLD AUTO: 44.8 % (ref 40–54)
HDLC SERPL-MCNC: 39 MG/DL (ref 40–75)
HDLC SERPL: 27.1 % (ref 20–50)
HGB BLD-MCNC: 14.5 G/DL (ref 14–18)
IMM GRANULOCYTES # BLD AUTO: 0.05 K/UL (ref 0–0.04)
IMM GRANULOCYTES NFR BLD AUTO: 0.7 % (ref 0–0.5)
LDLC SERPL CALC-MCNC: 66.6 MG/DL (ref 63–159)
LYMPHOCYTES # BLD AUTO: 1.8 K/UL (ref 1–4.8)
LYMPHOCYTES NFR BLD: 25 % (ref 18–48)
MCH RBC QN AUTO: 28.9 PG (ref 27–31)
MCHC RBC AUTO-ENTMCNC: 32.4 G/DL (ref 32–36)
MCV RBC AUTO: 89 FL (ref 82–98)
MONOCYTES # BLD AUTO: 0.7 K/UL (ref 0.3–1)
MONOCYTES NFR BLD: 9.5 % (ref 4–15)
NEUTROPHILS # BLD AUTO: 4.4 K/UL (ref 1.8–7.7)
NEUTROPHILS NFR BLD: 61.2 % (ref 38–73)
NONHDLC SERPL-MCNC: 105 MG/DL
NRBC BLD-RTO: 0 /100 WBC
PLATELET # BLD AUTO: 229 K/UL (ref 150–350)
PMV BLD AUTO: 10.8 FL (ref 9.2–12.9)
POTASSIUM SERPL-SCNC: 4.4 MMOL/L (ref 3.5–5.1)
PROT SERPL-MCNC: 7.3 G/DL (ref 6–8.4)
RBC # BLD AUTO: 5.02 M/UL (ref 4.6–6.2)
SODIUM SERPL-SCNC: 135 MMOL/L (ref 136–145)
TRIGL SERPL-MCNC: 192 MG/DL (ref 30–150)
TSH SERPL DL<=0.005 MIU/L-ACNC: 1.55 UIU/ML (ref 0.4–4)
WBC # BLD AUTO: 7.23 K/UL (ref 3.9–12.7)

## 2020-08-12 PROCEDURE — 36415 COLL VENOUS BLD VENIPUNCTURE: CPT | Mod: PO

## 2020-08-12 PROCEDURE — 83036 HEMOGLOBIN GLYCOSYLATED A1C: CPT

## 2020-08-12 PROCEDURE — 80061 LIPID PANEL: CPT

## 2020-08-12 PROCEDURE — 86703 HIV-1/HIV-2 1 RESULT ANTBDY: CPT

## 2020-08-12 PROCEDURE — 85025 COMPLETE CBC W/AUTO DIFF WBC: CPT

## 2020-08-12 PROCEDURE — 84443 ASSAY THYROID STIM HORMONE: CPT

## 2020-08-12 PROCEDURE — 80053 COMPREHEN METABOLIC PANEL: CPT

## 2020-08-12 PROCEDURE — 84153 ASSAY OF PSA TOTAL: CPT

## 2020-08-13 LAB — HIV 1+2 AB+HIV1 P24 AG SERPL QL IA: NEGATIVE

## 2020-08-17 ENCOUNTER — PATIENT OUTREACH (OUTPATIENT)
Dept: ADMINISTRATIVE | Facility: OTHER | Age: 55
End: 2020-08-17

## 2020-08-17 NOTE — PROGRESS NOTES
LINKS immunization registry updated  Care Everywhere updated  Health Maintenance updated  Chart reviewed for overdue Proactive [facility] Encounters ([facility]) health maintenance testing (CRS, Breast Ca, Diabetic Eye Exam)   Orders entered:N/A

## 2020-08-17 NOTE — PROGRESS NOTES
Subjective:      Patient ID: Billy Rivera is a 55 y.o. male.    Chief Complaint: Wound Check    Pt presents today s/p left hallux amputation. Pt is POD #10 POV#1. Pt states he has kept his post op bandage on and is staying off of it as much as possible. Pt is ambulating in a darco shoe. Pt denies any NVFCSOB.       Review of Systems   Constitution: Negative for chills, fever and malaise/fatigue.   HENT: Negative for hearing loss.    Cardiovascular: Positive for leg swelling. Negative for claudication.   Respiratory: Negative for shortness of breath.    Skin: Negative for flushing and rash.   Musculoskeletal: Negative for joint pain and myalgias.   Neurological: Positive for numbness, paresthesias and sensory change. Negative for loss of balance.   Psychiatric/Behavioral: Negative for altered mental status.           Objective:       Vitals:    08/11/20 0909   BP: 122/77   Pulse: 62   PainSc: 0-No pain        Physical Exam  Vitals signs reviewed.   Cardiovascular:      Pulses:           Dorsalis pedis pulses are 1+ on the right side and 1+ on the left side.        Posterior tibial pulses are 1+ on the right side and 1+ on the left side.      Comments: Localized edema noted to surgical site  Musculoskeletal:      Comments: Left hallux amp   Skin:     General: Skin is warm.      Comments: Ulcer location: 1.5x 1.6x 0.3cm  left, hallux amp site  W/ granular/fibrotic base. + oralia wound maceration  No purulence   Neurological:      Mental Status: He is alert and oriented to person, place, and time.      Comments: Absent gross sensation b/L   Psychiatric:         Mood and Affect: Mood normal.                   Assessment:       Encounter Diagnosis   Name Primary?    Dehiscence of operative wound, subsequent encounter Yes         Plan:       Billy was seen today for wound check.    Diagnoses and all orders for this visit:    Dehiscence of operative wound, subsequent encounter      I counseled the patient on his  conditions, their implications and medical management.    Debridement: With verbal consent, nonviable tissues on the left foot were debrided beyond sub q utilizing a  sterile No. 3 scalpel and forceps. Minimal bleeding controlled with direct pressure  The patient tolerated this well.     Dressings: Iodosorb w/ alginate  Offloading:Foam     MA assisted w/ application of football dressing under my direct supervision. Darco shoe applied to offload the area. Advised patient that this should be worn on the affected foot at all times when ambulating     Follow-up:Patient is to return to the clinic in 1 week  for follow-up but should call [facility] immediately if any signs of infection, such as fever, chills, sweats, increased redness or pain.    Short-term goals include maintaining good offloading and minimizing bioburden, promoting granulation and epithelialization to healing.  Long-term goals include keeping the wound healed by good offloading and medical management under the direction of internist.

## 2020-08-18 ENCOUNTER — OFFICE VISIT (OUTPATIENT)
Dept: PODIATRY | Facility: CLINIC | Age: 55
End: 2020-08-18
Payer: COMMERCIAL

## 2020-08-18 VITALS
DIASTOLIC BLOOD PRESSURE: 86 MMHG | HEART RATE: 88 BPM | BODY MASS INDEX: 33.73 KG/M2 | HEIGHT: 74 IN | SYSTOLIC BLOOD PRESSURE: 149 MMHG | WEIGHT: 262.81 LBS

## 2020-08-18 DIAGNOSIS — T81.31XD DEHISCENCE OF OPERATIVE WOUND, SUBSEQUENT ENCOUNTER: Primary | ICD-10-CM

## 2020-08-18 DIAGNOSIS — Z98.890 POST-OPERATIVE STATE: ICD-10-CM

## 2020-08-18 PROCEDURE — 99024 PR POST-OP FOLLOW-UP VISIT: ICD-10-PCS | Mod: S$GLB,,, | Performed by: PODIATRIST

## 2020-08-18 PROCEDURE — 99024 POSTOP FOLLOW-UP VISIT: CPT | Mod: S$GLB,,, | Performed by: PODIATRIST

## 2020-08-18 PROCEDURE — 99999 PR PBB SHADOW E&M-EST. PATIENT-LVL III: ICD-10-PCS | Mod: PBBFAC,,, | Performed by: PODIATRIST

## 2020-08-18 PROCEDURE — 99999 PR PBB SHADOW E&M-EST. PATIENT-LVL III: CPT | Mod: PBBFAC,,, | Performed by: PODIATRIST

## 2020-08-18 NOTE — LETTER
August 25, 2020      Zoila Qiu PA-C  [address] [facility] [address]           [id number]  [address]  [facility] [address]  Phone: [phone]          Patient: Billy Rivera   MR Number: [id number]   YOB: 1965   Date of Visit: 8/18/2020       Dear Zoila Qiu:    Thank you for referring Billy Rivera to me for evaluation. Attached you will find relevant portions of my assessment and plan of care.    If you have questions, please do not hesitate to call me. I look forward to following Billy Rivera along with you.    Sincerely,    Bridget Santana, DAE    Enclosure  CC:  No Recipients    If you would like to receive this communication electronically, please contact [email or web address] or [phone] to request more information on [employer] Link access.    For providers and/or their staff who would like to refer a patient to [facility], please contact us through our one-stop-shop provider referral line, [facility], at [phone].    If you feel you have received this communication in error or would no longer like to receive these types of communications, please e-mail [email or web address]

## 2020-08-18 NOTE — LETTER
August 18, 2020      [id number]  [address]  [facility] [address]  Phone: [phone]       Patient: Billy Rivera   YOB: 1965  Date of Visit: 08/18/2020    To Whom It May Concern:    Sharron Rivera  was at [facility] on 08/18/2020. He may return to work/school on 8/18/2020 with restrictions. He must work light duty, if possible desk work with limited walking. He must wear medical boot at all times. This, until next evaluation.  If you have any questions or concerns, or if I can be of further assistance, please do not hesitate to contact me.    Sincerely,            Bridget Santana DPM

## 2020-08-24 ENCOUNTER — TELEPHONE (OUTPATIENT)
Dept: PODIATRY | Facility: CLINIC | Age: 55
End: 2020-08-24

## 2020-08-24 ENCOUNTER — OFFICE VISIT (OUTPATIENT)
Dept: PODIATRY | Facility: CLINIC | Age: 55
End: 2020-08-24
Payer: COMMERCIAL

## 2020-08-24 ENCOUNTER — TELEPHONE (OUTPATIENT)
Dept: INTERNAL MEDICINE | Facility: CLINIC | Age: 55
End: 2020-08-24

## 2020-08-24 VITALS
WEIGHT: 262 LBS | HEIGHT: 74 IN | SYSTOLIC BLOOD PRESSURE: 122 MMHG | HEART RATE: 76 BPM | BODY MASS INDEX: 33.62 KG/M2 | DIASTOLIC BLOOD PRESSURE: 70 MMHG

## 2020-08-24 DIAGNOSIS — T81.31XD DEHISCENCE OF OPERATIVE WOUND, SUBSEQUENT ENCOUNTER: Primary | ICD-10-CM

## 2020-08-24 DIAGNOSIS — E11.9 DIABETES MELLITUS WITH INSULIN THERAPY: Primary | ICD-10-CM

## 2020-08-24 DIAGNOSIS — Z79.4 DIABETES MELLITUS WITH INSULIN THERAPY: Primary | ICD-10-CM

## 2020-08-24 DIAGNOSIS — L97.529 ULCER OF LEFT FOOT, UNSPECIFIED ULCER STAGE: ICD-10-CM

## 2020-08-24 PROBLEM — E11.51 DIABETES MELLITUS WITH PERIPHERAL CIRCULATORY DISORDER: Status: ACTIVE | Noted: 2020-08-24

## 2020-08-24 PROCEDURE — 99999 PR PBB SHADOW E&M-EST. PATIENT-LVL III: ICD-10-PCS | Mod: PBBFAC,,, | Performed by: STUDENT IN AN ORGANIZED HEALTH CARE EDUCATION/TRAINING PROGRAM

## 2020-08-24 PROCEDURE — 99024 PR POST-OP FOLLOW-UP VISIT: ICD-10-PCS | Mod: S$GLB,,, | Performed by: STUDENT IN AN ORGANIZED HEALTH CARE EDUCATION/TRAINING PROGRAM

## 2020-08-24 PROCEDURE — 99999 PR PBB SHADOW E&M-EST. PATIENT-LVL III: CPT | Mod: PBBFAC,,, | Performed by: STUDENT IN AN ORGANIZED HEALTH CARE EDUCATION/TRAINING PROGRAM

## 2020-08-24 PROCEDURE — 99024 POSTOP FOLLOW-UP VISIT: CPT | Mod: S$GLB,,, | Performed by: STUDENT IN AN ORGANIZED HEALTH CARE EDUCATION/TRAINING PROGRAM

## 2020-08-24 NOTE — PROCEDURES
Wound Debridement    Date/Time: 8/24/2020 10:30 AM  Performed by: Lyudmila Hawkins DPM  Authorized by: Lyudmila Hawkins DPM     Consent Done?:  Yes (Verbal)  Local anesthesia used?: No      Wound Details:    Location:  Left foot    Location:  Left 1st Metatarsal Head    Type of Debridement:  Excisional       Length (cm):  1.2       Area (sq cm):  1.8       Width (cm):  1.5       Percent Debrided (%):  100       Depth (cm):  0.4       Total Area Debrided (sq cm):  1.8    Depth of debridement:  Subcutaneous tissue    Tissue debrided:  Subcutaneous and Other    Devitalized tissue debrided:  Biofilm, Callus and Fibrin    Instruments:  Blade and Curette    Bleeding:  Minimal  Patient tolerance:  Patient tolerated the procedure well with no immediate complications

## 2020-08-24 NOTE — PROGRESS NOTES
"  Subjective:      Patient ID: Billy Rivera is a 55 y.o. male.    Chief Complaint: Foot Ulcer (left foot)    Pt with hx of DM2 seen today for ulcer, s/p left hallux amputation. Pt presents in DARCO shoe with foot ball intact. No new pedal complaints. Denies N/V/F/C.       Review of Systems   Constitution: Negative for chills, fever and malaise/fatigue.   HENT: Negative for hearing loss.    Cardiovascular: Positive for leg swelling. Negative for claudication.   Respiratory: Negative for shortness of breath.    Skin: Negative for flushing and rash.   Musculoskeletal: Negative for joint pain and myalgias.   Neurological: Positive for numbness, paresthesias and sensory change. Negative for loss of balance.   Psychiatric/Behavioral: Negative for altered mental status.           Objective:       Vitals:    08/24/20 1038   BP: 122/70   Pulse: 76   Weight: 118.8 kg (262 lb)   Height: 6' 2" (1.88 m)   PainSc: 0-No pain        Physical Exam  Vitals signs reviewed.   Constitutional:       General: He is not in acute distress.     Appearance: He is well-developed. He is not diaphoretic.   Cardiovascular:      Pulses:           Dorsalis pedis pulses are 1+ on the right side and 1+ on the left side.        Posterior tibial pulses are 1+ on the right side and 1+ on the left side.      Comments: Localized edema noted to surgical site  Musculoskeletal:         General: Deformity present. No tenderness.      Right foot: Decreased range of motion. Deformity present.      Left foot: Decreased range of motion. Deformity present.      Comments: Left hallux amp   Feet:      Right foot:      Skin integrity: Dry skin present. No blister, erythema or warmth.      Left foot:      Skin integrity: Dry skin present. No blister, erythema or warmth.   Lymphadenopathy:      Comments: Negative lymphadenopathy bilateral popliteal fossa and tarsal tunnel.    Skin:     General: Skin is warm and dry.      Coloration: Skin is not pale.      " Findings: No abrasion, bruising, burn, erythema, laceration, petechiae or rash.      Nails: There is no clubbing.        Comments: Ulcer location: 1.2x 1.5x 0.4cm  left, hallux amp site  W/ granular base, no oralia wound maceration  No purulence   Neurological:      Mental Status: He is alert and oriented to person, place, and time.      Sensory: Sensory deficit present.      Motor: No abnormal muscle tone.      Comments: Absent gross sensation b/L   Psychiatric:         Mood and Affect: Mood normal.         Behavior: Behavior normal.         Thought Content: Thought content normal.         Judgment: Judgment normal.                   Assessment:       Encounter Diagnoses   Name Primary?    Dehiscence of operative wound, subsequent encounter Yes    Ulcer of left foot, unspecified ulcer stage          Plan:       Billy was seen today for foot ulcer.    Diagnoses and all orders for this visit:    Dehiscence of operative wound, subsequent encounter    Ulcer of left foot, unspecified ulcer stage  -     Wound Debridement      I counseled the patient on his conditions, their implications and medical management.    Debridement: With verbal consent, nonviable tissues on the left foot were debrided beyond sub q utilizing a  sterile No. 3 scalpel and forceps. Minimal bleeding controlled with direct pressure  The patient tolerated this well. Pt will require subsequent debridement in the future.     Dressings: flor  Offloading:Foam    Football dressing applied with lisandro foam and cast padding with light coban. Darco shoe applied to offload the area. Advised patient that this should be worn on the affected foot at all times when ambulating     Follow-up:Patient is to return to the clinic in 1 week  for follow-up but should call [facility] immediately if any signs of infection, such as fever, chills, sweats, increased redness or pain.    Short-term goals include maintaining good offloading and minimizing bioburden, promoting  granulation and epithelialization to healing.  Long-term goals include keeping the wound healed by good offloading and medical management under the direction of internist.

## 2020-08-24 NOTE — TELEPHONE ENCOUNTER
----- Message from Nas Vazquez sent at 8/24/2020  8:32 AM CDT -----  Contact: self  Pt would like to speak with Dr or nurse concerning his bandages    Contact

## 2020-08-25 NOTE — TELEPHONE ENCOUNTER
Spoke with pt to advise of results and MD recommendations.    Verbalized understanding.    Referral message sent for pt to be contacted to schedule with Endo.

## 2020-08-25 NOTE — TELEPHONE ENCOUNTER
Advise pt recent labs ok other than diabetic control not optimal.  I would to refer him back to Endocrinology for management re Diabetes.---ref entered.

## 2020-08-25 NOTE — PROGRESS NOTES
"  Subjective:      Patient ID: Billy Rivera is a 55 y.o. male.    Chief Complaint: Diabetic Foot Exam (seen pcp 08/11/20) and Wound Check    Pt presents today s/p left hallux amputation. Pt states he has kept his post op bandage on and is staying off of it as much as possible. Pt is ambulating in a darco shoe. Pt denies any NVFCSOB.       Review of Systems   Constitution: Negative for chills, fever and malaise/fatigue.   HENT: Negative for hearing loss.    Cardiovascular: Positive for leg swelling. Negative for claudication.   Respiratory: Negative for shortness of breath.    Skin: Negative for flushing and rash.   Musculoskeletal: Negative for joint pain and myalgias.   Neurological: Positive for numbness, paresthesias and sensory change. Negative for loss of balance.   Psychiatric/Behavioral: Negative for altered mental status.           Objective:       Vitals:    08/18/20 0901   BP: (!) 149/86   Pulse: 88   Weight: 119.2 kg (262 lb 12.6 oz)   Height: 6' 2" (1.88 m)   PainSc: 0-No pain        Physical Exam  Vitals signs reviewed.   Cardiovascular:      Pulses:           Dorsalis pedis pulses are 1+ on the right side and 1+ on the left side.        Posterior tibial pulses are 1+ on the right side and 1+ on the left side.      Comments: Localized edema noted to surgical site  Musculoskeletal:      Comments: Left hallux amp   Skin:     General: Skin is warm.      Comments: Ulcer location: 1.3x 1.7x 0.3cm  left, hallux amp site  W/ granular/fibrotic base. + oralia wound maceration  No purulence   Neurological:      Mental Status: He is alert and oriented to person, place, and time.      Comments: Absent gross sensation b/L   Psychiatric:         Mood and Affect: Mood normal.                   Assessment:       Encounter Diagnoses   Name Primary?    Dehiscence of operative wound, subsequent encounter Yes    Post-operative state          Plan:       Billy was seen today for diabetic foot exam and wound " check.    Diagnoses and all orders for this visit:    Dehiscence of operative wound, subsequent encounter    Post-operative state      I counseled the patient on his conditions, their implications and medical management.    Debridement: With verbal consent, nonviable tissues on the left foot were debrided beyond sub q utilizing a  sterile No. 3 scalpel and forceps. Minimal bleeding controlled with direct pressure  The patient tolerated this well.     Dressings: Iodosorb w/ alginate  Offloading:Foam     MA assisted w/ application of football dressing under my direct supervision. Darco shoe applied to offload the area. Advised patient that this should be worn on the affected foot at all times when ambulating     Follow-up:Patient is to return to the clinic in 1 week  for follow-up but should call [facility] immediately if any signs of infection, such as fever, chills, sweats, increased redness or pain.    Short-term goals include maintaining good offloading and minimizing bioburden, promoting granulation and epithelialization to healing.  Long-term goals include keeping the wound healed by good offloading and medical management under the direction of internist.

## 2020-08-31 ENCOUNTER — PATIENT OUTREACH (OUTPATIENT)
Dept: ADMINISTRATIVE | Facility: HOSPITAL | Age: 55
End: 2020-08-31

## 2020-09-01 ENCOUNTER — OFFICE VISIT (OUTPATIENT)
Dept: PODIATRY | Facility: CLINIC | Age: 55
End: 2020-09-01
Payer: COMMERCIAL

## 2020-09-01 ENCOUNTER — HOSPITAL ENCOUNTER (INPATIENT)
Facility: HOSPITAL | Age: 55
LOS: 10 days | Discharge: HOME HEALTH | DRG: 628 | End: 2020-09-11
Attending: EMERGENCY MEDICINE | Admitting: HOSPITALIST
Payer: COMMERCIAL

## 2020-09-01 VITALS
DIASTOLIC BLOOD PRESSURE: 79 MMHG | TEMPERATURE: 98 F | WEIGHT: 266.13 LBS | BODY MASS INDEX: 34.15 KG/M2 | SYSTOLIC BLOOD PRESSURE: 135 MMHG | HEIGHT: 74 IN | HEART RATE: 78 BPM

## 2020-09-01 DIAGNOSIS — L03.90 CELLULITIS, UNSPECIFIED CELLULITIS SITE: Primary | ICD-10-CM

## 2020-09-01 DIAGNOSIS — B34.2 CORONAVIRUS INFECTION: Primary | ICD-10-CM

## 2020-09-01 DIAGNOSIS — M86.9 OSTEOMYELITIS: ICD-10-CM

## 2020-09-01 DIAGNOSIS — I25.10 CORONARY ARTERY DISEASE INVOLVING NATIVE CORONARY ARTERY OF NATIVE HEART WITHOUT ANGINA PECTORIS: ICD-10-CM

## 2020-09-01 DIAGNOSIS — E11.628 DIABETIC FOOT INFECTION: ICD-10-CM

## 2020-09-01 DIAGNOSIS — M86.172 ACUTE OSTEOMYELITIS OF LEFT FOOT: ICD-10-CM

## 2020-09-01 DIAGNOSIS — I73.9 PVD (PERIPHERAL VASCULAR DISEASE): ICD-10-CM

## 2020-09-01 DIAGNOSIS — E11.65 UNCONTROLLED TYPE 2 DIABETES MELLITUS WITH HYPERGLYCEMIA: ICD-10-CM

## 2020-09-01 DIAGNOSIS — T14.8XXA INFECTED WOUND: ICD-10-CM

## 2020-09-01 DIAGNOSIS — E78.5 DYSLIPIDEMIA: ICD-10-CM

## 2020-09-01 DIAGNOSIS — I10 ESSENTIAL HYPERTENSION: ICD-10-CM

## 2020-09-01 DIAGNOSIS — L08.9 DIABETIC FOOT INFECTION: ICD-10-CM

## 2020-09-01 DIAGNOSIS — L08.9 INFECTED WOUND: ICD-10-CM

## 2020-09-01 DIAGNOSIS — U07.1 COVID-19 VIRUS DETECTED: ICD-10-CM

## 2020-09-01 LAB
ANION GAP SERPL CALC-SCNC: 11 MMOL/L (ref 8–16)
BASOPHILS # BLD AUTO: 0.05 K/UL (ref 0–0.2)
BASOPHILS NFR BLD: 0.4 % (ref 0–1.9)
BUN SERPL-MCNC: 21 MG/DL (ref 6–20)
CALCIUM SERPL-MCNC: 9.5 MG/DL (ref 8.7–10.5)
CHLORIDE SERPL-SCNC: 94 MMOL/L (ref 95–110)
CO2 SERPL-SCNC: 23 MMOL/L (ref 23–29)
CREAT SERPL-MCNC: 1.1 MG/DL (ref 0.5–1.4)
CRP SERPL-MCNC: 108.9 MG/L (ref 0–8.2)
DIFFERENTIAL METHOD: ABNORMAL
EOSINOPHIL # BLD AUTO: 0.1 K/UL (ref 0–0.5)
EOSINOPHIL NFR BLD: 1.2 % (ref 0–8)
ERYTHROCYTE [DISTWIDTH] IN BLOOD BY AUTOMATED COUNT: 13.3 % (ref 11.5–14.5)
ERYTHROCYTE [SEDIMENTATION RATE] IN BLOOD BY WESTERGREN METHOD: 47 MM/HR (ref 0–23)
EST. GFR  (AFRICAN AMERICAN): >60 ML/MIN/1.73 M^2
EST. GFR  (NON AFRICAN AMERICAN): >60 ML/MIN/1.73 M^2
GLUCOSE SERPL-MCNC: 400 MG/DL (ref 70–110)
HCT VFR BLD AUTO: 45.7 % (ref 40–54)
HGB BLD-MCNC: 15.2 G/DL (ref 14–18)
IMM GRANULOCYTES # BLD AUTO: 0.06 K/UL (ref 0–0.04)
IMM GRANULOCYTES NFR BLD AUTO: 0.5 % (ref 0–0.5)
INR PPP: 1 (ref 0.8–1.2)
LYMPHOCYTES # BLD AUTO: 1.1 K/UL (ref 1–4.8)
LYMPHOCYTES NFR BLD: 9.2 % (ref 18–48)
MCH RBC QN AUTO: 28.7 PG (ref 27–31)
MCHC RBC AUTO-ENTMCNC: 33.3 G/DL (ref 32–36)
MCV RBC AUTO: 86 FL (ref 82–98)
MONOCYTES # BLD AUTO: 1.2 K/UL (ref 0.3–1)
MONOCYTES NFR BLD: 10.8 % (ref 4–15)
NEUTROPHILS # BLD AUTO: 8.9 K/UL (ref 1.8–7.7)
NEUTROPHILS NFR BLD: 77.9 % (ref 38–73)
NRBC BLD-RTO: 0 /100 WBC
PLATELET # BLD AUTO: 388 K/UL (ref 150–350)
PMV BLD AUTO: 10.3 FL (ref 9.2–12.9)
POCT GLUCOSE: 300 MG/DL (ref 70–110)
POTASSIUM SERPL-SCNC: 4.7 MMOL/L (ref 3.5–5.1)
PROTHROMBIN TIME: 10.7 SEC (ref 9–12.5)
RBC # BLD AUTO: 5.3 M/UL (ref 4.6–6.2)
SARS-COV-2 RDRP RESP QL NAA+PROBE: POSITIVE
SODIUM SERPL-SCNC: 128 MMOL/L (ref 136–145)
WBC # BLD AUTO: 11.46 K/UL (ref 3.9–12.7)

## 2020-09-01 PROCEDURE — 11000001 HC ACUTE MED/SURG PRIVATE ROOM

## 2020-09-01 PROCEDURE — 99024 POSTOP FOLLOW-UP VISIT: CPT | Mod: ,,, | Performed by: PODIATRIST

## 2020-09-01 PROCEDURE — 87077 CULTURE AEROBIC IDENTIFY: CPT

## 2020-09-01 PROCEDURE — 85652 RBC SED RATE AUTOMATED: CPT

## 2020-09-01 PROCEDURE — 63600175 PHARM REV CODE 636 W HCPCS: Performed by: EMERGENCY MEDICINE

## 2020-09-01 PROCEDURE — 99024 PR POST-OP FOLLOW-UP VISIT: ICD-10-PCS | Mod: ,,, | Performed by: PODIATRIST

## 2020-09-01 PROCEDURE — 36415 COLL VENOUS BLD VENIPUNCTURE: CPT

## 2020-09-01 PROCEDURE — 99999 PR PBB SHADOW E&M-EST. PATIENT-LVL III: ICD-10-PCS | Mod: PBBFAC,,, | Performed by: PODIATRIST

## 2020-09-01 PROCEDURE — 99223 1ST HOSP IP/OBS HIGH 75: CPT | Mod: ,,, | Performed by: HOSPITALIST

## 2020-09-01 PROCEDURE — 63600175 PHARM REV CODE 636 W HCPCS: Performed by: HOSPITALIST

## 2020-09-01 PROCEDURE — 87186 SC STD MICRODIL/AGAR DIL: CPT

## 2020-09-01 PROCEDURE — 86140 C-REACTIVE PROTEIN: CPT

## 2020-09-01 PROCEDURE — 99284 PR EMERGENCY DEPT VISIT,LEVEL IV: ICD-10-PCS | Mod: ,,, | Performed by: EMERGENCY MEDICINE

## 2020-09-01 PROCEDURE — 87040 BLOOD CULTURE FOR BACTERIA: CPT

## 2020-09-01 PROCEDURE — U0002 COVID-19 LAB TEST NON-CDC: HCPCS

## 2020-09-01 PROCEDURE — 99223 PR INITIAL HOSPITAL CARE,LEVL III: ICD-10-PCS | Mod: ,,, | Performed by: HOSPITALIST

## 2020-09-01 PROCEDURE — 99024 PR POST-OP FOLLOW-UP VISIT: ICD-10-PCS | Mod: S$GLB,,, | Performed by: PODIATRIST

## 2020-09-01 PROCEDURE — 99999 PR PBB SHADOW E&M-EST. PATIENT-LVL III: CPT | Mod: PBBFAC,,, | Performed by: PODIATRIST

## 2020-09-01 PROCEDURE — 25000003 PHARM REV CODE 250: Performed by: HOSPITALIST

## 2020-09-01 PROCEDURE — 85610 PROTHROMBIN TIME: CPT

## 2020-09-01 PROCEDURE — 99285 EMERGENCY DEPT VISIT HI MDM: CPT | Mod: 25

## 2020-09-01 PROCEDURE — 99284 EMERGENCY DEPT VISIT MOD MDM: CPT | Mod: ,,, | Performed by: EMERGENCY MEDICINE

## 2020-09-01 PROCEDURE — 85025 COMPLETE CBC W/AUTO DIFF WBC: CPT

## 2020-09-01 PROCEDURE — 87070 CULTURE OTHR SPECIMN AEROBIC: CPT

## 2020-09-01 PROCEDURE — 99024 POSTOP FOLLOW-UP VISIT: CPT | Mod: S$GLB,,, | Performed by: PODIATRIST

## 2020-09-01 PROCEDURE — 80048 BASIC METABOLIC PNL TOTAL CA: CPT

## 2020-09-01 PROCEDURE — 87147 CULTURE TYPE IMMUNOLOGIC: CPT

## 2020-09-01 PROCEDURE — 94761 N-INVAS EAR/PLS OXIMETRY MLT: CPT

## 2020-09-01 RX ORDER — ENOXAPARIN SODIUM 100 MG/ML
40 INJECTION SUBCUTANEOUS EVERY 24 HOURS
Status: DISCONTINUED | OUTPATIENT
Start: 2020-09-02 | End: 2020-09-11 | Stop reason: HOSPADM

## 2020-09-01 RX ORDER — GLUCAGON 1 MG
1 KIT INJECTION
Status: DISCONTINUED | OUTPATIENT
Start: 2020-09-01 | End: 2020-09-11 | Stop reason: HOSPADM

## 2020-09-01 RX ORDER — ASPIRIN 81 MG/1
81 TABLET ORAL DAILY
Status: DISCONTINUED | OUTPATIENT
Start: 2020-09-02 | End: 2020-09-11 | Stop reason: HOSPADM

## 2020-09-01 RX ORDER — CARVEDILOL 12.5 MG/1
12.5 TABLET ORAL 2 TIMES DAILY
Status: DISCONTINUED | OUTPATIENT
Start: 2020-09-01 | End: 2020-09-11 | Stop reason: HOSPADM

## 2020-09-01 RX ORDER — TALC
6 POWDER (GRAM) TOPICAL NIGHTLY PRN
Status: DISCONTINUED | OUTPATIENT
Start: 2020-09-01 | End: 2020-09-11 | Stop reason: HOSPADM

## 2020-09-01 RX ORDER — IBUPROFEN 200 MG
16 TABLET ORAL
Status: DISCONTINUED | OUTPATIENT
Start: 2020-09-01 | End: 2020-09-11 | Stop reason: HOSPADM

## 2020-09-01 RX ORDER — INSULIN ASPART 100 [IU]/ML
1-10 INJECTION, SOLUTION INTRAVENOUS; SUBCUTANEOUS
Status: DISCONTINUED | OUTPATIENT
Start: 2020-09-01 | End: 2020-09-11 | Stop reason: HOSPADM

## 2020-09-01 RX ORDER — CEFTRIAXONE 1 G/1
1 INJECTION, POWDER, FOR SOLUTION INTRAMUSCULAR; INTRAVENOUS
Status: COMPLETED | OUTPATIENT
Start: 2020-09-01 | End: 2020-09-01

## 2020-09-01 RX ORDER — ACETAMINOPHEN 325 MG/1
650 TABLET ORAL EVERY 4 HOURS PRN
Status: DISCONTINUED | OUTPATIENT
Start: 2020-09-01 | End: 2020-09-11 | Stop reason: HOSPADM

## 2020-09-01 RX ORDER — ATORVASTATIN CALCIUM 20 MG/1
80 TABLET, FILM COATED ORAL DAILY
Status: DISCONTINUED | OUTPATIENT
Start: 2020-09-02 | End: 2020-09-11 | Stop reason: HOSPADM

## 2020-09-01 RX ORDER — LISINOPRIL 20 MG/1
40 TABLET ORAL DAILY
Status: DISCONTINUED | OUTPATIENT
Start: 2020-09-02 | End: 2020-09-11 | Stop reason: HOSPADM

## 2020-09-01 RX ORDER — SODIUM CHLORIDE 0.9 % (FLUSH) 0.9 %
10 SYRINGE (ML) INJECTION
Status: DISCONTINUED | OUTPATIENT
Start: 2020-09-01 | End: 2020-09-11 | Stop reason: HOSPADM

## 2020-09-01 RX ORDER — ONDANSETRON 2 MG/ML
4 INJECTION INTRAMUSCULAR; INTRAVENOUS EVERY 8 HOURS PRN
Status: DISCONTINUED | OUTPATIENT
Start: 2020-09-01 | End: 2020-09-11 | Stop reason: HOSPADM

## 2020-09-01 RX ORDER — SODIUM CHLORIDE 9 MG/ML
INJECTION, SOLUTION INTRAVENOUS CONTINUOUS
Status: DISCONTINUED | OUTPATIENT
Start: 2020-09-01 | End: 2020-09-03

## 2020-09-01 RX ORDER — IBUPROFEN 200 MG
24 TABLET ORAL
Status: DISCONTINUED | OUTPATIENT
Start: 2020-09-01 | End: 2020-09-11 | Stop reason: HOSPADM

## 2020-09-01 RX ADMIN — CARVEDILOL 12.5 MG: 12.5 TABLET, FILM COATED ORAL at 11:09

## 2020-09-01 RX ADMIN — INSULIN ASPART 6 UNITS: 100 INJECTION, SOLUTION INTRAVENOUS; SUBCUTANEOUS at 08:09

## 2020-09-01 RX ADMIN — SODIUM CHLORIDE: 0.9 INJECTION, SOLUTION INTRAVENOUS at 08:09

## 2020-09-01 RX ADMIN — CEFTRIAXONE SODIUM 1 G: 1 INJECTION, POWDER, FOR SOLUTION INTRAMUSCULAR; INTRAVENOUS at 06:09

## 2020-09-01 NOTE — ED TRIAGE NOTES
Seen by Podiatry today and was sent to ER, believes left foot great toe infected. Denies CP, SOB, fevers, cold or flu like symptoms.      LOC: The patient is awake, alert, and oriented to place, time, situation. Affect is appropriate.  Speech is appropriate and clear.     APPEARANCE: Patient resting comfortably in no acute distress.  Patient is clean and well groomed.    SKIN: The skin is warm and dry; color consistent with ethnicity.  Patient has normal skin turgor and moist mucus membranes.  Skin intact; no breakdown or bruising noted with exception of left foot surgical wound discoloration and swelling noted.     MUSCULOSKELETAL: Patient moving upper and lower extremities without difficulty.  Denies weakness.     RESPIRATORY: Airway is open and patent. Respirations spontaneous, even, easy, and non-labored.  Patient has a normal effort and rate.  No accessory muscle use noted. Denies cough.     CARDIAC:  Normal rhythm and rate noted.  No peripheral edema noted. No complaints of chest pain.      ABDOMEN: Soft and non tender to palpation.  No distention noted.     NEUROLOGIC: Eyes open spontaneously.  Behavior appropriate to situation.  Follows commands; facial expression symmetrical.  Purposeful motor response noted; normal sensation in all extremities.

## 2020-09-01 NOTE — ED PROVIDER NOTES
"Encounter Date: 9/1/2020    SCRIBE #1 NOTE: IErika , am scribing for, and in the presence of, Zoila Jerez MD. Other sections scribed: HPI, ROS, PE.       History     Chief Complaint   Patient presents with    Leg Pain     sent from podiatry for admission , infected L foot     Time patient was seen by the provider: 3:25 PM      The patient is a 55 y.o. male with PMHx of CAD, type II DM, HTN, hyperlipidemia, history of cancer, and history of heart attack, who presents to the ED with a complaint of a possible infection of his left foot that started prior to arrival. Patient presented to podiatry today for an appointment, and was sent to the ED because of a possible infection of his left foot. Patient states that his left great toe was removed in July 2020. Patient endorses that he feels "great". Patient works for [employer]. Patient denies fevers.     The history is provided by the patient and medical records.     Review of patient's allergies indicates:   Allergen Reactions    Penicillins Other (See Comments)     PCN allergy as a child - was told he went into a coma. Tolerates Cefazolin without adverse reactions    Shellfish containing products      Other reaction(s): Unknown    Vancomycin Itching    Bactrim  [sulfamethoxazole-trimethoprim] Rash     Past Medical History:   Diagnosis Date    Allergy     CAD (coronary artery disease), native coronary artery 6/25/2013    Cancer     Diabetes mellitus     Diabetes mellitus, type 2     Disorder of kidney and ureter     Heart attack 04/2012    Hx of colon cancer, stage I     Hyperlipidemia     Hypertension     Muscular pain     post-op after colonoscopy    MICHAELA (obstructive sleep apnea)     Retinopathy due to secondary diabetes      Past Surgical History:   Procedure Laterality Date    COLONOSCOPY N/A 2/17/2017    Procedure: COLONOSCOPY;  Surgeon: Simon Gomez MD;  Location: [facility] ([address] [place] [address]);  Service: Endoscopy;  " Laterality: N/A;    CORONARY ANGIOPLASTY      INCISION AND DRAINAGE FOOT Right 6/5/2018    Procedure: INCISION AND DRAINAGE, FOOT;  Surgeon: Bridget Santana DPM;  [facility];  Service: Podiatry;  Laterality: Right;  Request mini C arm in room. request wound vac with medium black sponge    INCISION AND DRAINAGE FOOT Right 6/7/2018    Procedure: INCISION AND DRAINAGE, FOOT;  Surgeon: Emelia Brock DPM;  [facility];  Service: Podiatry;  Laterality: Right;    INCISION AND DRAINAGE OF ABSCESS Right 6/2/2018    Procedure: INCISION AND DRAINAGE, ABSCESS;  Surgeon: Bridget Santana DPM;  Location: [facility] OR [address] [place] [address];  Service: General;  Laterality: Right;    REMOVAL OF FOREIGN BODY FROM FOOT Right 6/7/2018    Procedure: REMOVAL, FOREIGN BODY, FOOT;  Surgeon: Emelia Brock DPM;  Location: [facility] OR 2ND FLR;  Service: Podiatry;  Laterality: Right;    TOE AMPUTATION Left 7/4/2020    Procedure: AMPUTATION, TOE;  Surgeon: Bridget Santana DPM;  [facility];  Service: Podiatry;  Laterality: Left;    TONSILLECTOMY       Family History   Problem Relation Age of Onset    Heart disease Mother     No Known Problems Father     Heart disease Brother     Anesthesia problems Neg Hx      Social History     Tobacco Use    Smoking status: Never Smoker    Smokeless tobacco: Never Used   Substance Use Topics    Alcohol use: Yes     Comment: rare x1 beer-     Drug use: No     Review of Systems   Constitutional: Negative for fever.   HENT: Negative for sore throat.    Respiratory: Negative for shortness of breath.    Cardiovascular: Negative for chest pain.   Gastrointestinal: Negative for nausea.   Genitourinary: Negative for dysuria.   Musculoskeletal: Negative for back pain.   Skin: Negative for rash.        Positive for possible infection of his left foot.    Neurological: Negative for weakness.   Hematological: Does not bruise/bleed easily.       Physical Exam     Initial Vitals  [09/01/20 1456]   BP Pulse Resp Temp SpO2   119/68 80 18 98.8 °F (37.1 °C) 98 %      MAP       --         Physical Exam  Gen: AxOx4, NAD, appears stated age  Eye: EOMI, no scleral icterus, no periorbital edema or ecchymosis  Head: NCAT, no lesions  ENT: neck supple, no stridor, no masses  CVS: warm and well perfused  PULM: normal work of breathing, no stridor  ABD: scaphoid, nondistended  Ext: no rash, no deformities  Neuro: HAMILTON, gait intact  Musculoskeletal: S/p amputation of left great toe. At incision site, there is yellowish, cheese-like discharge. At the most medial portion, there is purulent discharge with surrounding erythema, warmth, and induration extending to midfoot.   ED Course   Procedures  Labs Reviewed   CBC W/ AUTO DIFFERENTIAL - Abnormal; Notable for the following components:       Result Value    Platelets 388 (*)     Gran # (ANC) 8.9 (*)     Immature Grans (Abs) 0.06 (*)     Mono # 1.2 (*)     Gran% 77.9 (*)     Lymph% 9.2 (*)     All other components within normal limits   BASIC METABOLIC PANEL - Abnormal; Notable for the following components:    Sodium 128 (*)     Chloride 94 (*)     Glucose 400 (*)     BUN, Bld 21 (*)     All other components within normal limits   SEDIMENTATION RATE - Abnormal; Notable for the following components:    Sed Rate 47 (*)     All other components within normal limits   C-REACTIVE PROTEIN - Abnormal; Notable for the following components:    .9 (*)     All other components within normal limits   SARS-COV-2 RNA AMPLIFICATION, QUAL - Abnormal; Notable for the following components:    SARS-CoV-2 RNA, Amplification, Qual Positive (*)     All other components within normal limits   CULTURE, AEROBIC  (SPECIFY SOURCE)   PROTIME-INR          Imaging Results    None          Medical Decision Making:   History:   Old Medical Records: I decided to obtain old medical records.  Initial Assessment:   55 y.o. male with PMHx of CAD, type II DM, HTN, hyperlipidemia, history of  cancer, and history of heart attack, who presents to the ED with a complaint of a possible infection of his left foot that started prior to arrival.  I am concerned about postoperative wound infection, osteomyelitis, cellulitis, versus abscess.  Plan for labs, empiric antibiotics, and x-ray of the foot.  ED Management:  Patient's labs are notable for positive COVID swab.  We have placed him in isolation.  He also has elevated inflammatory markers, and new hyponatremia, which could be either due to his coronavirus infection versus a deep space infection the wound.  I have administered ceftriaxone based on prior sensitivities.  Plan for admission to the hospital for further management.  X-ray is pending at time of admission.            Scribe Attestation:   Scribe #1: I performed the above scribed service and the documentation accurately describes the services I performed. I attest to the accuracy of the note.                          Clinical Impression:       ICD-10-CM ICD-9-CM   1. Coronavirus infection  B34.2 079.89   2. Osteomyelitis  M86.9 730.20             ED Disposition Condition    Admit                           Zoila Jerez MD  09/01/20 6438

## 2020-09-01 NOTE — H&P
[facility]  History and Physical Exam    [place]: [facility] Amor Echevarria MD  Admit Date: 9/1/2020  Principal Problem:  Diabetic foot infection   Patient information was obtained from patient, past medical records and ER records.   Primary care Physician: BRAD Baker MD  Code status: Full Code    HPI: 56 yo M with PMHx DM type 2, osteomyelitis of L 1st toe s/p amputation, PVD, CAD, HLD, and HTN who presents from podiatry clinic for worsening purulence and erythema at site of previous amputation. The patient underwent amputation of L 1st toe 7/4. Since then, he has been on multiple courses of abx, and he has had delayed wound healing. He sees podiatry every Tuesday for wound care. Today on evaluation, they noted significant purulence at the wound site and worsening surrounding erythema and warmth. He was referred to the ED for further management. Pt. Denies pain as he has veery little feeling in his feet. He denies any fevers, chills, nausea, vomiting, diarrhea or malaise. He reports feeling his normal self. He tested positive for COVID-19 during routine screening while in the ED. On further questioning, he does report a dry cough for about a month now. He denies any other respiratory symptoms or changes in taste/smell. No known sick contacts.    Hemoglobin A1C   Date Value Ref Range Status   08/12/2020 11.5 (H) 4.0 - 5.6 % Final     Comment:     ADA Screening Guidelines:  5.7-6.4%  Consistent with prediabetes  >or=6.5%  Consistent with diabetes  High levels of fetal hemoglobin interfere with the HbA1C  assay. Heterozygous hemoglobin variants (HbS, HgC, etc)do  not significantly interfere with this assay.   However, presence of multiple variants may affect accuracy.     07/03/2020 11.8 (H) 4.0 - 5.6 % Final     Comment:     ADA Screening Guidelines:  5.7-6.4%  Consistent with prediabetes  >or=6.5%  Consistent with diabetes  High levels of fetal hemoglobin interfere with the HbA1C  assay. Heterozygous  hemoglobin variants (HbS, HgC, etc)do  not significantly interfere with this assay.   However, presence of multiple variants may affect accuracy.     04/14/2020 13.1 (H) 4.0 - 5.6 % Final     Comment:     ADA Screening Guidelines:  5.7-6.4%  Consistent with prediabetes  >or=6.5%  Consistent with diabetes  High levels of fetal hemoglobin interfere with the HbA1C  assay. Heterozygous hemoglobin variants (HbS, HgC, etc)do  not significantly interfere with this assay.   However, presence of multiple variants may affect accuracy.         Past Medical History: Patient has a past medical history of Allergy, CAD (coronary artery disease), native coronary artery (6/25/2013), Cancer, Diabetes mellitus, Diabetes mellitus, type 2, Disorder of kidney and ureter, Heart attack (04/2012), colon cancer, stage I, Hyperlipidemia, Hypertension, Muscular pain, MICHAELA (obstructive sleep apnea), and Retinopathy due to secondary diabetes.    Past Surgical History: Patient has a past surgical history that includes Coronary angioplasty; Colonoscopy (N/A, 2/17/2017); Tonsillectomy; Incision and drainage of abscess (Right, 6/2/2018); Incision and drainage foot (Right, 6/5/2018); Incision and drainage foot (Right, 6/7/2018); Removal of foreign body from foot (Right, 6/7/2018); and Toe amputation (Left, 7/4/2020).    Social History: Patient reports that he has never smoked. He has never used smokeless tobacco. He reports current alcohol use. He reports that he does not use drugs.    Family History: family history includes Heart disease in his brother and mother; No Known Problems in his father.    Medications: reviewed     Allergies: Patient is allergic to penicillins; shellfish containing products; vancomycin; and bactrim  [sulfamethoxazole-trimethoprim].    ROS  Pain Scale: 0 /10   Constitutional: no fever or chills  Respiratory: Positive for cough, no SOB  Cardiovascular: no chest pain or palpitations  Gastrointestinal: no nausea or vomiting, no  abdominal pain or change in bowel habits  Genitourinary: no hematuria or dysuria  Integument/Breast: Positive for erythema and drainage at wound site as above  Hematologic/Lymphatic: no easy bruising or lymphadenopathy  Musculoskeletal: no arthralgias or myalgias  Neurological: no seizures or tremors  Behavioral/Psych: no depression or anxiety    PEx  Temp:  [98.3 °F (36.8 °C)-98.8 °F (37.1 °C)]   Pulse:  [78-80]   Resp:  [18]   BP: (119-135)/(68-79)   SpO2:  [98 %]   Body mass index is 34.02 kg/m².   No intake or output data in the 24 hours ending 09/01/20 1802    General appearance: no distress, pt. Resting comfortably  Mental status: Alert and oriented x 3  HEENT:  conjunctivae/corneas clear, PERRL  Neck: supple, thyroid not enlarged  Pulm:   normal respiratory effort, CTA B, no c/w/r  Card: RRR, S1, S2 normal, no murmur, click, rub or gallop  Abd: soft, NT, ND, BS present; no masses, no organomegaly  Ext: L foot with significant erythema and chronic skin changes tracking up to just below ankle. Purulent discharge noted at incision site with surrounding edema and warmth  Pulses: 2+, symmetric  Skin: color, texture, turgor normal. No rashes or lesions  Neuro: CN II-XII grossly intact, no focal numbness or weakness, normal strength and tone     Recent Results (from the past 24 hour(s))   CBC auto differential    Collection Time: 09/01/20  3:46 PM   Result Value Ref Range    WBC 11.46 3.90 - 12.70 K/uL    RBC 5.30 4.60 - 6.20 M/uL    Hemoglobin 15.2 14.0 - 18.0 g/dL    Hematocrit 45.7 40.0 - 54.0 %    Mean Corpuscular Volume 86 82 - 98 fL    Mean Corpuscular Hemoglobin 28.7 27.0 - 31.0 pg    Mean Corpuscular Hemoglobin Conc 33.3 32.0 - 36.0 g/dL    RDW 13.3 11.5 - 14.5 %    Platelets 388 (H) 150 - 350 K/uL    MPV 10.3 9.2 - 12.9 fL    Immature Granulocytes 0.5 0.0 - 0.5 %    Gran # (ANC) 8.9 (H) 1.8 - 7.7 K/uL    Immature Grans (Abs) 0.06 (H) 0.00 - 0.04 K/uL    Lymph # 1.1 1.0 - 4.8 K/uL    Mono # 1.2 (H) 0.3 - 1.0  K/uL    Eos # 0.1 0.0 - 0.5 K/uL    Baso # 0.05 0.00 - 0.20 K/uL    nRBC 0 0 /100 WBC    Gran% 77.9 (H) 38.0 - 73.0 %    Lymph% 9.2 (L) 18.0 - 48.0 %    Mono% 10.8 4.0 - 15.0 %    Eosinophil% 1.2 0.0 - 8.0 %    Basophil% 0.4 0.0 - 1.9 %    Differential Method Automated    Basic metabolic panel    Collection Time: 09/01/20  3:46 PM   Result Value Ref Range    Sodium 128 (L) 136 - 145 mmol/L    Potassium 4.7 3.5 - 5.1 mmol/L    Chloride 94 (L) 95 - 110 mmol/L    CO2 23 23 - 29 mmol/L    Glucose 400 (H) 70 - 110 mg/dL    BUN, Bld 21 (H) 6 - 20 mg/dL    Creatinine 1.1 0.5 - 1.4 mg/dL    Calcium 9.5 8.7 - 10.5 mg/dL    Anion Gap 11 8 - 16 mmol/L    eGFR if African American >60.0 >60 mL/min/1.73 m^2    eGFR if non African American >60.0 >60 mL/min/1.73 m^2   Protime-INR    Collection Time: 09/01/20  3:46 PM   Result Value Ref Range    Prothrombin Time 10.7 9.0 - 12.5 sec    INR 1.0 0.8 - 1.2   Sedimentation rate    Collection Time: 09/01/20  3:46 PM   Result Value Ref Range    Sed Rate 47 (H) 0 - 23 mm/Hr   C-reactive protein    Collection Time: 09/01/20  3:46 PM   Result Value Ref Range    .9 (H) 0.0 - 8.2 mg/L   COVID-19 Rapid Screening    Collection Time: 09/01/20  3:46 PM   Result Value Ref Range    SARS-CoV-2 RNA, Amplification, Qual Positive (A) Negative       No results for input(s): POCTGLUCOSE in the last 168 hours.    Active Hospital Problems    Diagnosis  POA    Osteomyelitis [M86.9]  Yes      Resolved Hospital Problems   No resolved problems to display.       Assessment and Plan:  Diabetic Foot Infection  -Pt. With purulent drainage from previous L 1st metatarsal Head amputation site with worsening surrounding erythema and warmth  -Pt. Currently afebrile, WBC WNL, and vitals stable. Does have noted .9, increased from WNL 1 month ago  -X-ray shows soft tissue edema, not evidence of osteo. MRI ordered to further characterize extent of infection  -IV ceftriaxone given in ED, will broaden coverage  to include MRSA and anaerobics with addition of vanc and flagyl  -Podiatry consulted, will make pt. NPO @ [facility] until they evaluate need for surgery in am  -F/U wound culture taken in podiatry clinic and blood cultures. ID consult for final abx recommendations pending results of MRI/culture and podiatry evaluation    COVID-19 Virus Detected  - COVID-19 testing POSITIVE 9/1/20  -Pt. Denies any respiratory symptoms, is afebirle. Inflammatory markers ESR, CRP markedly elevated, but suspect related to above diabetic foot infection rather than COVID. CXR ordered along with further markers. Monitor closely with general care plan outlined below.    - Infection Control notified   - Isolation:   - Airborne, Contact and Droplet Precautions  - Cohort patients into COVID units  - N95 mask, wear eye protection  - 20 second hand hygiene              - Limit visitors per hospital policy              - Consolidating lab draws, nursing care, provider visits, and interventions    - Diagnostics: (leukopenia, hyponatremia, hyperferritinemia, elevated troponin, elevated d-dimer, age, and comorbidities are significant predictors of poor clinical outcome)  CBC, CMP, Ferritin, CRP and Portable CXR    - Management:  Supplemental O2 to maintain SpO2 >92%  Continuous/intermittent Pulse Ox  Albuterol treatment PRN    Diabetes mellitus with insulin therapy  Hgb A1C 11.5 8/2020  Home medication: Glargine 55 U HS and metformin 1 g BID, humulin 20 TID WM  - on presentation, will start levemir 40 units tonight with SSI as pt. To be NPO at midnight. Monitor BG and titrate as necessary     Dyslipidemia associated with type 2 diabetes mellitus  Continue home medication Atorvastatin     PVD (peripheral vascular disease)  - Continue home medications ASA and atorvastatin     Coronary artery disease involving native coronary artery of native heart without angina pectoris  -No acute issues  -Continue home ASA, statin    HTN (hypertension)  -Continue  home meds: carvedilol 12.5 and lisinopril 40mg    DVT PPx: Lovenox    Amor Echevarria MD  Hospital Medicine Staff  [phone] pager

## 2020-09-02 PROBLEM — M86.172 ACUTE OSTEOMYELITIS OF LEFT FOOT: Status: ACTIVE | Noted: 2020-09-02

## 2020-09-02 LAB
ALBUMIN SERPL BCP-MCNC: 2.4 G/DL (ref 3.5–5.2)
ALP SERPL-CCNC: 84 U/L (ref 55–135)
ALT SERPL W/O P-5'-P-CCNC: 9 U/L (ref 10–44)
ANION GAP SERPL CALC-SCNC: 10 MMOL/L (ref 8–16)
AST SERPL-CCNC: 9 U/L (ref 10–40)
BASOPHILS # BLD AUTO: 0.03 K/UL (ref 0–0.2)
BASOPHILS NFR BLD: 0.4 % (ref 0–1.9)
BILIRUB SERPL-MCNC: 0.5 MG/DL (ref 0.1–1)
BUN SERPL-MCNC: 20 MG/DL (ref 6–20)
CALCIUM SERPL-MCNC: 8.3 MG/DL (ref 8.7–10.5)
CHLORIDE SERPL-SCNC: 101 MMOL/L (ref 95–110)
CO2 SERPL-SCNC: 22 MMOL/L (ref 23–29)
CREAT SERPL-MCNC: 0.8 MG/DL (ref 0.5–1.4)
DIFFERENTIAL METHOD: ABNORMAL
EOSINOPHIL # BLD AUTO: 0.1 K/UL (ref 0–0.5)
EOSINOPHIL NFR BLD: 1.8 % (ref 0–8)
ERYTHROCYTE [DISTWIDTH] IN BLOOD BY AUTOMATED COUNT: 13.2 % (ref 11.5–14.5)
EST. GFR  (AFRICAN AMERICAN): >60 ML/MIN/1.73 M^2
EST. GFR  (NON AFRICAN AMERICAN): >60 ML/MIN/1.73 M^2
FERRITIN SERPL-MCNC: 470 NG/ML (ref 20–300)
GLUCOSE SERPL-MCNC: 329 MG/DL (ref 70–110)
HCT VFR BLD AUTO: 36.9 % (ref 40–54)
HGB BLD-MCNC: 12.3 G/DL (ref 14–18)
IMM GRANULOCYTES # BLD AUTO: 0.03 K/UL (ref 0–0.04)
IMM GRANULOCYTES NFR BLD AUTO: 0.4 % (ref 0–0.5)
LYMPHOCYTES # BLD AUTO: 1.7 K/UL (ref 1–4.8)
LYMPHOCYTES NFR BLD: 22.2 % (ref 18–48)
MCH RBC QN AUTO: 28.7 PG (ref 27–31)
MCHC RBC AUTO-ENTMCNC: 33.3 G/DL (ref 32–36)
MCV RBC AUTO: 86 FL (ref 82–98)
MONOCYTES # BLD AUTO: 1 K/UL (ref 0.3–1)
MONOCYTES NFR BLD: 13.4 % (ref 4–15)
NEUTROPHILS # BLD AUTO: 4.8 K/UL (ref 1.8–7.7)
NEUTROPHILS NFR BLD: 61.8 % (ref 38–73)
NRBC BLD-RTO: 0 /100 WBC
PLATELET # BLD AUTO: 285 K/UL (ref 150–350)
PMV BLD AUTO: 9.8 FL (ref 9.2–12.9)
POCT GLUCOSE: 250 MG/DL (ref 70–110)
POCT GLUCOSE: 272 MG/DL (ref 70–110)
POCT GLUCOSE: 278 MG/DL (ref 70–110)
POCT GLUCOSE: 347 MG/DL (ref 70–110)
POTASSIUM SERPL-SCNC: 4.5 MMOL/L (ref 3.5–5.1)
PROT SERPL-MCNC: 6.2 G/DL (ref 6–8.4)
RBC # BLD AUTO: 4.29 M/UL (ref 4.6–6.2)
SODIUM SERPL-SCNC: 133 MMOL/L (ref 136–145)
WBC # BLD AUTO: 7.74 K/UL (ref 3.9–12.7)

## 2020-09-02 PROCEDURE — 11000001 HC ACUTE MED/SURG PRIVATE ROOM

## 2020-09-02 PROCEDURE — 63600175 PHARM REV CODE 636 W HCPCS: Performed by: INTERNAL MEDICINE

## 2020-09-02 PROCEDURE — 97161 PT EVAL LOW COMPLEX 20 MIN: CPT

## 2020-09-02 PROCEDURE — 80053 COMPREHEN METABOLIC PANEL: CPT

## 2020-09-02 PROCEDURE — 99223 1ST HOSP IP/OBS HIGH 75: CPT | Mod: ,,, | Performed by: INTERNAL MEDICINE

## 2020-09-02 PROCEDURE — 85025 COMPLETE CBC W/AUTO DIFF WBC: CPT

## 2020-09-02 PROCEDURE — 82728 ASSAY OF FERRITIN: CPT

## 2020-09-02 PROCEDURE — 99222 PR INITIAL HOSPITAL CARE,LEVL II: ICD-10-PCS | Mod: ,,, | Performed by: SURGERY

## 2020-09-02 PROCEDURE — C9399 UNCLASSIFIED DRUGS OR BIOLOG: HCPCS | Performed by: HOSPITALIST

## 2020-09-02 PROCEDURE — 36415 COLL VENOUS BLD VENIPUNCTURE: CPT

## 2020-09-02 PROCEDURE — 97165 OT EVAL LOW COMPLEX 30 MIN: CPT

## 2020-09-02 PROCEDURE — 63600175 PHARM REV CODE 636 W HCPCS: Performed by: HOSPITALIST

## 2020-09-02 PROCEDURE — 99223 PR INITIAL HOSPITAL CARE,LEVL III: ICD-10-PCS | Mod: ,,, | Performed by: INTERNAL MEDICINE

## 2020-09-02 PROCEDURE — 94761 N-INVAS EAR/PLS OXIMETRY MLT: CPT

## 2020-09-02 PROCEDURE — 99233 SBSQ HOSP IP/OBS HIGH 50: CPT | Mod: ,,, | Performed by: INTERNAL MEDICINE

## 2020-09-02 PROCEDURE — 99231 PR SUBSEQUENT HOSPITAL CARE,LEVL I: ICD-10-PCS | Mod: 57,,, | Performed by: PODIATRIST

## 2020-09-02 PROCEDURE — S0030 INJECTION, METRONIDAZOLE: HCPCS | Performed by: HOSPITALIST

## 2020-09-02 PROCEDURE — 99231 SBSQ HOSP IP/OBS SF/LOW 25: CPT | Mod: 57,,, | Performed by: PODIATRIST

## 2020-09-02 PROCEDURE — 99222 1ST HOSP IP/OBS MODERATE 55: CPT | Mod: ,,, | Performed by: SURGERY

## 2020-09-02 PROCEDURE — 25000003 PHARM REV CODE 250: Performed by: HOSPITALIST

## 2020-09-02 PROCEDURE — 99233 PR SUBSEQUENT HOSPITAL CARE,LEVL III: ICD-10-PCS | Mod: ,,, | Performed by: INTERNAL MEDICINE

## 2020-09-02 RX ORDER — CEFTRIAXONE 1 G/1
1 INJECTION, POWDER, FOR SOLUTION INTRAMUSCULAR; INTRAVENOUS
Status: DISCONTINUED | OUTPATIENT
Start: 2020-09-02 | End: 2020-09-02

## 2020-09-02 RX ORDER — METRONIDAZOLE 500 MG/100ML
500 INJECTION, SOLUTION INTRAVENOUS
Status: DISCONTINUED | OUTPATIENT
Start: 2020-09-02 | End: 2020-09-02

## 2020-09-02 RX ORDER — INSULIN ASPART 100 [IU]/ML
5 INJECTION, SOLUTION INTRAVENOUS; SUBCUTANEOUS
Status: DISCONTINUED | OUTPATIENT
Start: 2020-09-02 | End: 2020-09-05

## 2020-09-02 RX ADMIN — INSULIN ASPART 6 UNITS: 100 INJECTION, SOLUTION INTRAVENOUS; SUBCUTANEOUS at 04:09

## 2020-09-02 RX ADMIN — CARVEDILOL 12.5 MG: 12.5 TABLET, FILM COATED ORAL at 08:09

## 2020-09-02 RX ADMIN — INSULIN ASPART 5 UNITS: 100 INJECTION, SOLUTION INTRAVENOUS; SUBCUTANEOUS at 11:09

## 2020-09-02 RX ADMIN — METRONIDAZOLE 500 MG: 500 INJECTION, SOLUTION INTRAVENOUS at 02:09

## 2020-09-02 RX ADMIN — INSULIN ASPART 6 UNITS: 100 INJECTION, SOLUTION INTRAVENOUS; SUBCUTANEOUS at 11:09

## 2020-09-02 RX ADMIN — INSULIN ASPART 5 UNITS: 100 INJECTION, SOLUTION INTRAVENOUS; SUBCUTANEOUS at 04:09

## 2020-09-02 RX ADMIN — CEFTRIAXONE SODIUM 2 G: 2 INJECTION, SOLUTION INTRAVENOUS at 06:09

## 2020-09-02 RX ADMIN — INSULIN DETEMIR 40 UNITS: 100 INJECTION, SOLUTION SUBCUTANEOUS at 08:09

## 2020-09-02 RX ADMIN — METRONIDAZOLE 500 MG: 500 INJECTION, SOLUTION INTRAVENOUS at 11:09

## 2020-09-02 RX ADMIN — ATORVASTATIN CALCIUM 80 MG: 20 TABLET, FILM COATED ORAL at 08:09

## 2020-09-02 RX ADMIN — LISINOPRIL 40 MG: 20 TABLET ORAL at 08:09

## 2020-09-02 RX ADMIN — SODIUM CHLORIDE: 0.9 INJECTION, SOLUTION INTRAVENOUS at 05:09

## 2020-09-02 RX ADMIN — INSULIN ASPART 4 UNITS: 100 INJECTION, SOLUTION INTRAVENOUS; SUBCUTANEOUS at 07:09

## 2020-09-02 RX ADMIN — INSULIN ASPART 4 UNITS: 100 INJECTION, SOLUTION INTRAVENOUS; SUBCUTANEOUS at 08:09

## 2020-09-02 RX ADMIN — INSULIN ASPART 5 UNITS: 100 INJECTION, SOLUTION INTRAVENOUS; SUBCUTANEOUS at 08:09

## 2020-09-02 RX ADMIN — ENOXAPARIN SODIUM 40 MG: 40 INJECTION SUBCUTANEOUS at 04:09

## 2020-09-02 RX ADMIN — ASPIRIN 81 MG: 81 TABLET, COATED ORAL at 08:09

## 2020-09-02 RX ADMIN — MELATONIN TAB 3 MG 6 MG: 3 TAB at 08:09

## 2020-09-02 NOTE — HPI
56 yo M with Hx of DM2, osteomyelitis of L 1st digit (s/p amputation 07/04), PVD, CAD (past MI), HTN, HLD sent by podiatry clinic due to suspected infection at amputation site. Patient has had delayed healing of the surgical wound despite multiple courses of PO antibiotics. Cultures from 07/04 grew Prevotella for which he was prescribed doxycycline and metronidazole. The wound ultimately dehisced and repeat cultures from 07/28 grew Strep agalactiae for which he was prescribed clindamycin. Yesterday at followup podiatry appointment worsening purulence and erythema were noted, patient was sent to ED. Patient denies pain at the amputation site, though he admits to diabetic neuropathy. He tested positive for COVID-19 with preadmission screen, only symptom is dry cough of 1 month duration.

## 2020-09-02 NOTE — PROGRESS NOTES
Pharmacokinetic Initial Assessment: IV Vancomycin    Assessment/Plan:    Initiate intravenous vancomycin with loading dose of 2500 mg once followed by a maintenance dose of vancomycin 2000mg IV every 12 hours  Desired empiric serum trough concentration is 10 to 20 mcg/mL  Draw vancomycin trough level 60 min prior to fourth dose on 9/3/20 at approximately 1330.  Pharmacy will continue to follow and monitor vancomycin.      Please contact pharmacy at extension [phone] with any questions regarding this assessment.     Thank you for the consult,   Chris FRAN Jaspreetthad       Patient brief summary:  Billy Rivera is a 55 y.o. male initiated on antimicrobial therapy with IV Vancomycin for treatment of suspected skin & soft tissue infection    Drug Allergies:   Review of patient's allergies indicates:   Allergen Reactions    Penicillins Other (See Comments)     PCN allergy as a child - was told he went into a coma. Tolerates Cefazolin without adverse reactions    Shellfish containing products      Other reaction(s): Unknown    Vancomycin Itching    Bactrim  [sulfamethoxazole-trimethoprim] Rash       Actual Body Weight:   120.2 kg    Renal Function:   Estimated Creatinine Clearance: 103.3 mL/min (based on SCr of 1.1 mg/dL).,     Dialysis Method (if applicable):  N/A    CBC (last 72 hours):  Recent Labs   Lab Result Units 09/01/20  1546   WBC K/uL 11.46   Hemoglobin g/dL 15.2   Hematocrit % 45.7   Platelets K/uL 388*   Gran% % 77.9*   Lymph% % 9.2*   Mono% % 10.8   Eosinophil% % 1.2   Basophil% % 0.4   Differential Method  Automated       Metabolic Panel (last 72 hours):  Recent Labs   Lab Result Units 09/01/20  1546   Sodium mmol/L 128*   Potassium mmol/L 4.7   Chloride mmol/L 94*   CO2 mmol/L 23   Glucose mg/dL 400*   BUN, Bld mg/dL 21*   Creatinine mg/dL 1.1       Drug levels (last 3 results):  No results for input(s): VANCOMYCINRA, VANCOMYCINPE, VANCOMYCINTR in the last 72 hours.    Microbiologic Results:  Microbiology  Results (last 7 days)       Procedure Component Value Units Date/Time    Blood culture [[id number]] Collected: 09/01/20 2306    Order Status: Sent Specimen: Blood Updated: 09/02/20 0008    Blood culture [[id number]] Collected: 09/01/20 2211    Order Status: Sent Specimen: Blood from Peripheral, Antecubital, Right Updated: 09/01/20 2229    Aerobic culture (Specify Source) **CANNOT BE ORDERED AS STAT* [[id number]] Collected: 09/01/20 1731    Order Status: Sent Specimen: Incision site from Toe, Left Foot Updated: 09/01/20 1744

## 2020-09-02 NOTE — PLAN OF CARE
Problem: Occupational Therapy Goal  Goal: Occupational Therapy Goal  Outcome: Met    OT evaluation completed. Patient presents at [facility]. No further OT services needed at this time.  Sherly Watson OT  9/2/2020

## 2020-09-02 NOTE — SUBJECTIVE & OBJECTIVE
Past Medical History:   Diagnosis Date    Allergy     CAD (coronary artery disease), native coronary artery 6/25/2013    Cancer     Diabetes mellitus     Diabetes mellitus, type 2     Disorder of kidney and ureter     Heart attack 04/2012    Hx of colon cancer, stage I     Hyperlipidemia     Hypertension     Muscular pain     post-op after colonoscopy    MICHAELA (obstructive sleep apnea)     Retinopathy due to secondary diabetes        Past Surgical History:   Procedure Laterality Date    COLONOSCOPY N/A 2/17/2017    Procedure: COLONOSCOPY;  Surgeon: Simon Gomez MD;  Location: [facility] ENDO (4TH FLR);  Service: Endoscopy;  Laterality: N/A;    CORONARY ANGIOPLASTY      INCISION AND DRAINAGE FOOT Right 6/5/2018    Procedure: INCISION AND DRAINAGE, FOOT;  Surgeon: Bridget Santana DPM;  Location: [facility] OR 1ST FLR;  Service: Podiatry;  Laterality: Right;  Request mini C arm in room. request wound vac with medium black sponge    INCISION AND DRAINAGE FOOT Right 6/7/2018    Procedure: INCISION AND DRAINAGE, FOOT;  Surgeon: Emelia Brock DPM;  Location: [facility] OR 2ND FLR;  Service: Podiatry;  Laterality: Right;    INCISION AND DRAINAGE OF ABSCESS Right 6/2/2018    Procedure: INCISION AND DRAINAGE, ABSCESS;  Surgeon: Bridget Santana DPM;  Location: [facility] OR 2ND FLR;  Service: General;  Laterality: Right;    REMOVAL OF FOREIGN BODY FROM FOOT Right 6/7/2018    Procedure: REMOVAL, FOREIGN BODY, FOOT;  Surgeon: Emelia Brock DPM;  Location: [facility] OR 2ND FLR;  Service: Podiatry;  Laterality: Right;    TOE AMPUTATION Left 7/4/2020    Procedure: AMPUTATION, TOE;  Surgeon: Bridget Santana DPM;  Location: [facility] OR 2ND FLR;  Service: Podiatry;  Laterality: Left;    TONSILLECTOMY         Review of patient's allergies indicates:   Allergen Reactions    Penicillins Other (See Comments)     PCN allergy as a child - was told he went into a coma. Tolerates Cefazolin without adverse reactions    Shellfish containing  "products      Other reaction(s): Unknown    Vancomycin Itching     Tolerated vancomycin 7/2020    Bactrim  [sulfamethoxazole-trimethoprim] Rash       Medications:  Medications Prior to Admission   Medication Sig    atorvastatin (LIPITOR) 80 MG tablet Take 1 tablet (80 mg total) by mouth once daily.    BIOTIN ORAL Take 1 capsule by mouth once daily.    blood sugar diagnostic Strp Strips and lancets    Use before meals and bedtime    carvediloL (COREG) 12.5 MG tablet Take 1 tablet (12.5 mg total) by mouth 2 (two) times daily.    insulin glargine (LANTUS) 100 unit/mL injection Inject 55 Units into the skin every evening.    insulin regular 100 unit/mL Inj injection Inject 20 Units into the skin 3 (three) times daily before meals.    lisinopriL (PRINIVIL,ZESTRIL) 40 MG tablet Take 1 tablet (40 mg total) by mouth once daily.    metFORMIN (GLUCOPHAGE) 1000 MG tablet Take 1 tablet (1,000 mg total) by mouth 2 (two) times daily with meals.    pen needle, diabetic 31 gauge x 3/16" Ndle Inject 1 each into the skin 3 (three) times daily before meals. (Patient taking differently: Inject 1 each into the skin 4 (four) times daily. )    aspirin (ECOTRIN) 81 MG EC tablet Take 1 tablet (81 mg total) by mouth once daily.     Antibiotics (From admission, onward)    Start     Stop Route Frequency Ordered    09/02/20 1800  cefTRIAXone injection 1 g      -- IV Every 24 hours (non-standard times) 09/02/20 0134    09/02/20 0245  metronidazole IVPB 500 mg      -- IV Every 8 hours (non-standard times) 09/02/20 0134        Antifungals (From admission, onward)    None        Antivirals (From admission, onward)    None           Immunization History   Administered Date(s) Administered    Influenza 01/18/2019    Influenza - Intradermal - Quadrivalent - PF 10/24/2013    Influenza - Quadrivalent - PF *Preferred* (6 months and older) 11/08/2010    Pneumococcal Polysaccharide - 23 Valent 06/29/2017    Tdap 09/08/2015       Family " History     Problem Relation (Age of Onset)    Heart disease Mother, Brother    No Known Problems Father        Social History     Socioeconomic History    Marital status:      Spouse name: Not on file    Number of children: Not on file    Years of education: Not on file    Highest education level: Not on file   Occupational History    Not on file   Social Needs    Financial resource strain: Not on file    Food insecurity     Worry: Not on file     Inability: Not on file    Transportation needs     Medical: Not on file     Non-medical: Not on file   Tobacco Use    Smoking status: Never Smoker    Smokeless tobacco: Never Used   Substance and Sexual Activity    Alcohol use: Yes     Comment: rare x1 beer-     Drug use: No    Sexual activity: Not Currently   Lifestyle    Physical activity     Days per week: Not on file     Minutes per session: Not on file    Stress: Not on file   Relationships    Social connections     Talks on phone: Not on file     Gets together: Not on file     Attends [employer] service: Not on file     Active member of club or organization: Not on file     Attends meetings of clubs or organizations: Not on file     Relationship status: Not on file   Other Topics Concern    Not on file   Social History Narrative    Not on file     Review of Systems   Constitutional: Negative for chills and fever.   HENT: Negative for congestion, ear pain, rhinorrhea and sneezing.    Eyes: Negative for pain.   Respiratory: Positive for cough. Negative for shortness of breath.    Cardiovascular: Positive for leg swelling. Negative for chest pain.   Gastrointestinal: Positive for vomiting (chronic). Negative for abdominal pain, constipation, diarrhea and nausea.   Endocrine: Negative for polyuria.   Genitourinary: Negative for decreased urine volume, difficulty urinating, dysuria and flank pain.   Musculoskeletal: Negative for back pain, neck pain and neck stiffness.   Skin: Positive for wound.  Negative for rash.   Neurological: Positive for numbness. Negative for weakness and headaches.   Psychiatric/Behavioral: Negative for confusion. The patient is not nervous/anxious.      Objective:     Vital Signs (Most Recent):  Temp: 98.3 °F (36.8 °C) (09/02/20 0815)  Pulse: 81 (09/02/20 0400)  Resp: 19 (09/02/20 0400)  BP: 129/77 (09/02/20 0815)  SpO2: 97 % (09/02/20 0400) Vital Signs (24h Range):  Temp:  [98.1 °F (36.7 °C)-98.8 °F (37.1 °C)] 98.3 °F (36.8 °C)  Pulse:  [78-97] 81  Resp:  [18-19] 19  SpO2:  [97 %-98 %] 97 %  BP: (119-135)/(66-79) 129/77     Weight: 120.2 kg (265 lb)  Body mass index is 34.01 kg/m².    Estimated Creatinine Clearance: 142 mL/min (based on SCr of 0.8 mg/dL).    Physical Exam  Constitutional:       Appearance: Normal appearance. He is obese. He is not ill-appearing or toxic-appearing.   HENT:      Head: Normocephalic and atraumatic.      Right Ear: External ear normal.      Left Ear: External ear normal.      Nose: Nose normal.      Mouth/Throat:      Pharynx: Oropharynx is clear.   Eyes:      General:         Right eye: No discharge.         Left eye: No discharge.      Extraocular Movements: Extraocular movements intact.   Neck:      Musculoskeletal: Normal range of motion. No neck rigidity.   Cardiovascular:      Comments: Bilateral DP and PT pulses diminished  Pulmonary:      Effort: Pulmonary effort is normal. No respiratory distress.   Chest:      Chest wall: No tenderness.   Abdominal:      General: There is no distension.      Palpations: Abdomen is soft.      Tenderness: There is no abdominal tenderness. There is no guarding.   Musculoskeletal:         General: Swelling (amputation site) present. No tenderness.   Skin:     General: Skin is warm and dry.      Findings: Erythema (amputation site) present.      Comments: 2 wounds near L 1st ray amputation site. Distal wound 1.2x0.7x2.2 cm, probes to bone, purulent drainage, erythematous periwound, periwound macerated, not tender.  Medial wound 1x0.7x1 cm, probes to deep soft tissues but not bone, purulent and sanguinous drainage, erythematous periwound, periwound macerated, not tender.    Neurological:      Mental Status: He is alert and oriented to person, place, and time.      Sensory: Sensory deficit (bilateral loss of protective sensation in feet) present.      Gait: Gait abnormal.   Psychiatric:         Mood and Affect: Mood normal.         Behavior: Behavior normal.         Thought Content: Thought content normal.         Judgment: Judgment normal.         Significant Labs:   Blood Culture:   Recent Labs   Lab 07/03/20  1450 07/03/20  1541 07/03/20 2020 09/01/20  2211 09/01/20  2306   LABBLOO No growth after 5 days. No growth after 5 days. No growth after 5 days.  No growth after 5 days. No Growth to date No Growth to date     CBC:   Recent Labs   Lab 09/01/20  1546 09/02/20  0234   WBC 11.46 7.74   HGB 15.2 12.3*   HCT 45.7 36.9*   * 285     CMP:   Recent Labs   Lab 09/01/20  1546 09/02/20  0234   * 133*   K 4.7 4.5   CL 94* 101   CO2 23 22*   * 329*   BUN 21* 20   CREATININE 1.1 0.8   CALCIUM 9.5 8.3*   PROT  --  6.2   ALBUMIN  --  2.4*   BILITOT  --  0.5   ALKPHOS  --  84   AST  --  9*   ALT  --  9*   ANIONGAP 11 10   EGFRNONAA >60.0 >60.0     Lactic Acid: No results for input(s): LACTATE in the last 48 hours.    POCT Glucose:   Recent Labs   Lab 09/01/20 2025 09/02/20  0732   POCTGLUCOSE 300* 250*     Wound Culture:   Recent Labs   Lab 07/03/20  1540 07/04/20  1212 07/14/20  0830 07/28/20  1617 09/01/20  1731   LABAERO No significant isolate No growth  No growth No growth STREPTOCOCCUS AGALACTIAE (GROUP B)  Rare  Beta-hemolytic streptococci are routinely susceptible to   penicillins,cephalosporins and carbapenems.  * GRAM NEGATIVE VIVIAN  Rare  Identification and susceptibility pending  *       Significant Imaging  X Ray R Foot 9/1: Postoperative changes of the left 1st ray.  Increased soft tissue swelling of  the left 1st ray and throughout the left foot.  Additional evaluation, as clinically warranted.   MRI R Foot 9/1: Postoperative changes of resection involving the distal aspects of the 1st ray. MRI findings suggestive of osteomyelitis of the 1st metatarsal bone with 2 sinus tracts extending towards the metatarsal head.  Associated diffuse subcutaneous edema.  No discrete fluid collection to suggest abscess.

## 2020-09-02 NOTE — SUBJECTIVE & OBJECTIVE
"Medications Prior to Admission   Medication Sig Dispense Refill Last Dose    atorvastatin (LIPITOR) 80 MG tablet Take 1 tablet (80 mg total) by mouth once daily. 30 tablet 6 9/1/2020    BIOTIN ORAL Take 1 capsule by mouth once daily.   9/1/2020    blood sugar diagnostic Strp Strips and lancets    Use before meals and bedtime 150 strip 12 9/1/2020    carvediloL (COREG) 12.5 MG tablet Take 1 tablet (12.5 mg total) by mouth 2 (two) times daily. 60 tablet 11 9/1/2020    insulin glargine (LANTUS) 100 unit/mL injection Inject 55 Units into the skin every evening. 20 mL 11 9/1/2020    insulin regular 100 unit/mL Inj injection Inject 20 Units into the skin 3 (three) times daily before meals.  12 9/1/2020    lisinopriL (PRINIVIL,ZESTRIL) 40 MG tablet Take 1 tablet (40 mg total) by mouth once daily. 30 tablet 6 9/1/2020    metFORMIN (GLUCOPHAGE) 1000 MG tablet Take 1 tablet (1,000 mg total) by mouth 2 (two) times daily with meals. 60 tablet 6 9/1/2020    pen needle, diabetic 31 gauge x 3/16" Ndle Inject 1 each into the skin 3 (three) times daily before meals. (Patient taking differently: Inject 1 each into the skin 4 (four) times daily. ) 100 each 12 9/1/2020    aspirin (ECOTRIN) 81 MG EC tablet Take 1 tablet (81 mg total) by mouth once daily.  0        Review of patient's allergies indicates:   Allergen Reactions    Penicillins Other (See Comments)     PCN allergy as a child - was told he went into a coma. Tolerates Cefazolin without adverse reactions    Shellfish containing products      Other reaction(s): Unknown    Vancomycin Itching     Tolerated vancomycin 7/2020    Bactrim  [sulfamethoxazole-trimethoprim] Rash       Past Medical History:   Diagnosis Date    Allergy     CAD (coronary artery disease), native coronary artery 6/25/2013    Cancer     Diabetes mellitus     Diabetes mellitus, type 2     Disorder of kidney and ureter     Heart attack 04/2012    Hx of colon cancer, stage I     " Hyperlipidemia     Hypertension     Muscular pain     post-op after colonoscopy    MICHAELA (obstructive sleep apnea)     Retinopathy due to secondary diabetes      Past Surgical History:   Procedure Laterality Date    COLONOSCOPY N/A 2/17/2017    Procedure: COLONOSCOPY;  Surgeon: Simon Gomez MD;  Location: [facility] ENDO (4TH FLR);  Service: Endoscopy;  Laterality: N/A;    CORONARY ANGIOPLASTY      INCISION AND DRAINAGE FOOT Right 6/5/2018    Procedure: INCISION AND DRAINAGE, FOOT;  Surgeon: Bridget Santana DPM;  Location: [facility] OR 1ST FLR;  Service: Podiatry;  Laterality: Right;  Request mini C arm in room. request wound vac with medium black sponge    INCISION AND DRAINAGE FOOT Right 6/7/2018    Procedure: INCISION AND DRAINAGE, FOOT;  Surgeon: Emelia Brock DPM;  Location: [facility] OR 2ND FLR;  Service: Podiatry;  Laterality: Right;    INCISION AND DRAINAGE OF ABSCESS Right 6/2/2018    Procedure: INCISION AND DRAINAGE, ABSCESS;  Surgeon: Bridget Santana DPM;  Location: [facility] OR 2ND FLR;  Service: General;  Laterality: Right;    REMOVAL OF FOREIGN BODY FROM FOOT Right 6/7/2018    Procedure: REMOVAL, FOREIGN BODY, FOOT;  Surgeon: Emelia Brock DPM;  Location: [facility] OR 2ND FLR;  Service: Podiatry;  Laterality: Right;    TOE AMPUTATION Left 7/4/2020    Procedure: AMPUTATION, TOE;  Surgeon: Bridget Santana DPM;  Location: [facility] OR 2ND FLR;  Service: Podiatry;  Laterality: Left;    TONSILLECTOMY       Family History     Problem Relation (Age of Onset)    Heart disease Mother, Brother    No Known Problems Father        Tobacco Use    Smoking status: Never Smoker    Smokeless tobacco: Never Used   Substance and Sexual Activity    Alcohol use: Yes     Comment: rare x1 beer-     Drug use: No    Sexual activity: Not Currently     Review of Systems   Constitutional: Negative for activity change, chills, diaphoresis and fever.   HENT: Negative for congestion and ear pain.    Respiratory: Positive for cough. Negative  for chest tightness, shortness of breath, wheezing and stridor.    Cardiovascular: Positive for leg swelling (Prior cellulitis with swelling in the past). Negative for chest pain.   Gastrointestinal: Negative for abdominal distention and abdominal pain.   Musculoskeletal: Negative for arthralgias, gait problem and neck stiffness.   Neurological: Negative for dizziness and numbness.     Objective:     Vital Signs (Most Recent):  Temp: 98.3 °F (36.8 °C) (09/02/20 0815)  Pulse: 81 (09/02/20 0400)  Resp: 19 (09/02/20 0400)  BP: 115/63 (09/02/20 1140)  SpO2: 97 % (09/02/20 0400) Vital Signs (24h Range):  Temp:  [98.1 °F (36.7 °C)-98.6 °F (37 °C)] 98.3 °F (36.8 °C)  Pulse:  [80-97] 81  Resp:  [18-19] 19  SpO2:  [97 %-98 %] 97 %  BP: (115-129)/(63-77) 115/63     Weight: 120.2 kg (265 lb)  Body mass index is 34.01 kg/m².    Physical Exam  HENT:      Head: Normocephalic.      Mouth/Throat:      Mouth: Mucous membranes are moist.   Eyes:      Extraocular Movements: Extraocular movements intact.      Pupils: Pupils are equal, round, and reactive to light.   Cardiovascular:      Rate and Rhythm: Normal rate and regular rhythm.      Comments: Bilateral non-palpable pulses, DP/PT are bilaterally triphasic, L DP is a bit diminished.  Pulmonary:      Effort: Pulmonary effort is normal. No respiratory distress.      Breath sounds: No wheezing or rhonchi.   Abdominal:      General: Abdomen is flat.      Palpations: Abdomen is soft.   Skin:     General: Skin is warm.      Capillary Refill: Capillary refill takes less than 2 seconds.   Neurological:      General: No focal deficit present.      Mental Status: He is alert and oriented to person, place, and time.         Significant Labs:  BMP:   Recent Labs   Lab 09/02/20  0234   *   *   K 4.5      CO2 22*   BUN 20   CREATININE 0.8   CALCIUM 8.3*     CBC:   Recent Labs   Lab 09/02/20  0234   WBC 7.74   RBC 4.29*   HGB 12.3*   HCT 36.9*      MCV 86   MCH 28.7    [facility] 33.3       Significant Diagnostics:  Previous Art US and VASILIY     EXAMINATION:  US ARTERIAL LOWER EXTREMITY BILAT WITH VASILIY (XPD)     CLINICAL HISTORY:  eval for PAD  ulceration right foot.;     TECHNIQUE:  Bilateral lower extremity arterial duplex ultrasound examination performed. Multiple gray scale and color doppler images were obtained in addition to waveform analysis.  Ankle-brachial indices were calculated.     COMPARISON:  None     FINDINGS:  The ankle brachial index on the left is 1.14.  Right VASILIY not obtained secondary to patient's lower extremity cellulitis/wound.     The peak systolic velocities on the right are as follows, in centimeters/second:     Common femoral artery: 91     Superficial femoral artery, proximal: 97     Superficial femoral artery, mid portion: 113     Superficial femoral artery, distal: 95     Popliteal artery: 96     Posterior tibial artery: 98     Anterior tibial artery: 94     The peak systolic velocities on the left are as follows, in centimeters/second:     Common femoral artery: 120     Superficial femoral artery, proximal: 84     Superficial femoral artery, mid portion: 92     Superficial femoral artery, distal: 85     Popliteal artery: 67     Posterior tibial artery: 92     Anterior tibial artery: 63     Normal arterial waveforms are demonstrated.     IMPRESSION:      Ankle-brachial index of 1.14 on the left.  VASILIY on the right not obtained secondary to patient's lower extremity cellulitis/wound.     No hemodynamically significant stenosis demonstrated in the right or left lower extremity arterial system.

## 2020-09-02 NOTE — ASSESSMENT & PLAN NOTE
A: Osteomyelitis of left 1st metatarsal per clinical exam and MRI. Prior cultures have grown Prevotella and Strep agalactiae. Vitals stable. Most recent WBCs 7.74 (decreasing), ESR 47, .9. Wound culture growing Klebsiella pneumoniae and Strep agalactiae, blood cultures NGTD.     P:   -Stop metronidazole and change ceftriaxone to 2g [id number]. Orders entered.  -Spoke with Dr Jerez in ED, wound culture based on superficial swab of pus expressed manually from wound  -Podiatry planning resection of infected bone, will followup on micro results.   -Will continue to follow.

## 2020-09-02 NOTE — CONSULTS
[facility] - ICU 14 WT  Infectious Disease  Consult Note    Patient Name: Billy Sheffield Miguel  MRN: [id number]  Admission Date: 9/1/2020  Hospital Length of Stay: 1 days  Attending Physician: Zachary Ansari MD  Primary Care Provider: BRAD Baker MD     Isolation Status: Airborne and Contact and Droplet    Patient information was obtained from patient, past medical records and ER records.      Inpatient consult to Infectious Diseases  Consult performed by: Josiah Saundres MD  Consult ordered by: Amor Echevarria MD        Assessment/Plan:     * Diabetic foot infection  A: Osteomyelitis of left 1st metatarsal per clinical exam and MRI. Prior cultures have grown Prevotella and Strep agalactiae. Vitals stable. Most recent WBCs 7.74 (decreasing), ESR 47, .9. Wound culture growing Klebsiella pneumoniae and Strep agalactiae, blood cultures NGTD.     P:   -Stop metronidazole and change ceftriaxone to 2g [id number]. Orders entered.  -Spoke with Dr Jerez in ED, wound culture based on superficial swab of pus expressed manually from wound  -Podiatry planning resection of infected bone, will followup on micro results.   -Will continue to follow.         Thank you for your consult. I will follow-up with patient. Please contact us if you have any additional questions.    Josiah Saunders MD  Infectious Disease  [facility] - ICU 14 WT    Subjective:     Principal Problem: Diabetic foot infection    HPI: 54 yo M with Hx of DM2, osteomyelitis of L 1st digit (s/p amputation 07/04), PVD, CAD (past MI), HTN, HLD sent by podiatry clinic due to suspected infection at amputation site. Patient has had delayed healing of the surgical wound despite multiple courses of PO antibiotics. Cultures from 07/04 grew Prevotella for which he was prescribed doxycycline and metronidazole. The wound ultimately dehisced and repeat cultures from 07/28 grew Strep agalactiae for which he was prescribed clindamycin.  Yesterday at followup podiatry appointment worsening purulence and erythema were noted, patient was sent to ED. Patient denies pain at the amputation site, though he admits to diabetic neuropathy. He tested positive for COVID-19 with preadmission screen, only symptom is dry cough of 1 month duration.     Past Medical History:   Diagnosis Date    Allergy     CAD (coronary artery disease), native coronary artery 6/25/2013    Cancer     Diabetes mellitus     Diabetes mellitus, type 2     Disorder of kidney and ureter     Heart attack 04/2012    Hx of colon cancer, stage I     Hyperlipidemia     Hypertension     Muscular pain     post-op after colonoscopy    MICHAELA (obstructive sleep apnea)     Retinopathy due to secondary diabetes        Past Surgical History:   Procedure Laterality Date    COLONOSCOPY N/A 2/17/2017    Procedure: COLONOSCOPY;  Surgeon: Simon Gomez MD;  Location: [facility] ENDO (4TH FLR);  Service: Endoscopy;  Laterality: N/A;    CORONARY ANGIOPLASTY      INCISION AND DRAINAGE FOOT Right 6/5/2018    Procedure: INCISION AND DRAINAGE, FOOT;  Surgeon: Bridget Santana DPM;  Location: [facility] OR [facility] FLR;  Service: Podiatry;  Laterality: Right;  Request mini C arm in room. request wound vac with medium black sponge    INCISION AND DRAINAGE FOOT Right 6/7/2018    Procedure: INCISION AND DRAINAGE, FOOT;  Surgeon: Emelia Brock DPM;  Location: [facility] OR 2ND FLR;  Service: Podiatry;  Laterality: Right;    INCISION AND DRAINAGE OF ABSCESS Right 6/2/2018    Procedure: INCISION AND DRAINAGE, ABSCESS;  Surgeon: Bridget Santana DPM;  Location: [facility] OR 2ND FLR;  Service: General;  Laterality: Right;    REMOVAL OF FOREIGN BODY FROM FOOT Right 6/7/2018    Procedure: REMOVAL, FOREIGN BODY, FOOT;  Surgeon: Emelia Brock DPM;  Location: [facility] OR 2ND FLR;  Service: Podiatry;  Laterality: Right;    TOE AMPUTATION Left 7/4/2020    Procedure: AMPUTATION, TOE;  Surgeon: Bridget Santana DPM;  Location: [facility] OR [facility]  "FLR;  Service: Podiatry;  Laterality: Left;    TONSILLECTOMY         Review of patient's allergies indicates:   Allergen Reactions    Penicillins Other (See Comments)     PCN allergy as a child - was told he went into a coma. Tolerates Cefazolin without adverse reactions    Shellfish containing products      Other reaction(s): Unknown    Vancomycin Itching     Tolerated vancomycin 7/2020    Bactrim  [sulfamethoxazole-trimethoprim] Rash       Medications:  Medications Prior to Admission   Medication Sig    atorvastatin (LIPITOR) 80 MG tablet Take 1 tablet (80 mg total) by mouth once daily.    BIOTIN ORAL Take 1 capsule by mouth once daily.    blood sugar diagnostic Strp Strips and lancets    Use before meals and bedtime    carvediloL (COREG) 12.5 MG tablet Take 1 tablet (12.5 mg total) by mouth 2 (two) times daily.    insulin glargine (LANTUS) 100 unit/mL injection Inject 55 Units into the skin every evening.    insulin regular 100 unit/mL Inj injection Inject 20 Units into the skin 3 (three) times daily before meals.    lisinopriL (PRINIVIL,ZESTRIL) 40 MG tablet Take 1 tablet (40 mg total) by mouth once daily.    metFORMIN (GLUCOPHAGE) 1000 MG tablet Take 1 tablet (1,000 mg total) by mouth 2 (two) times daily with meals.    pen needle, diabetic 31 gauge x 3/16" Ndle Inject 1 each into the skin 3 (three) times daily before meals. (Patient taking differently: Inject 1 each into the skin 4 (four) times daily. )    aspirin (ECOTRIN) 81 MG EC tablet Take 1 tablet (81 mg total) by mouth once daily.     Antibiotics (From admission, onward)    Start     Stop Route Frequency Ordered    09/02/20 1800  cefTRIAXone injection 1 g      -- IV Every 24 hours (non-standard times) 09/02/20 0134    09/02/20 0245  metronidazole IVPB 500 mg      -- IV Every 8 hours (non-standard times) 09/02/20 0134        Antifungals (From admission, onward)    None        Antivirals (From admission, onward)    None       "     Immunization History   Administered Date(s) Administered    Influenza 01/18/2019    Influenza - Intradermal - Quadrivalent - PF 10/24/2013    Influenza - Quadrivalent - PF *Preferred* (6 months and older) 11/08/2010    Pneumococcal Polysaccharide - 23 Valent 06/29/2017    Tdap 09/08/2015       Family History     Problem Relation (Age of Onset)    Heart disease Mother, Brother    No Known Problems Father        Social History     Socioeconomic History    Marital status:      Spouse name: Not on file    Number of children: Not on file    Years of education: Not on file    Highest education level: Not on file   Occupational History    Not on file   Social Needs    Financial resource strain: Not on file    Food insecurity     Worry: Not on file     Inability: Not on file    Transportation needs     Medical: Not on file     Non-medical: Not on file   Tobacco Use    Smoking status: Never Smoker    Smokeless tobacco: Never Used   Substance and Sexual Activity    Alcohol use: Yes     Comment: rare x1 beer-     Drug use: No    Sexual activity: Not Currently   Lifestyle    Physical activity     Days per week: Not on file     Minutes per session: Not on file    Stress: Not on file   Relationships    Social connections     Talks on phone: Not on file     Gets together: Not on file     Attends Quaker service: Not on file     Active member of club or organization: Not on file     Attends meetings of clubs or organizations: Not on file     Relationship status: Not on file   Other Topics Concern    Not on file   Social History Narrative    Not on file     Review of Systems   Constitutional: Negative for chills and fever.   HENT: Negative for congestion, ear pain, rhinorrhea and sneezing.    Eyes: Negative for pain.   Respiratory: Positive for cough. Negative for shortness of breath.    Cardiovascular: Positive for leg swelling. Negative for chest pain.   Gastrointestinal: Positive for vomiting  (chronic). Negative for abdominal pain, constipation, diarrhea and nausea.   Endocrine: Negative for polyuria.   Genitourinary: Negative for decreased urine volume, difficulty urinating, dysuria and flank pain.   Musculoskeletal: Negative for back pain, neck pain and neck stiffness.   Skin: Positive for wound. Negative for rash.   Neurological: Positive for numbness. Negative for weakness and headaches.   Psychiatric/Behavioral: Negative for confusion. The patient is not nervous/anxious.      Objective:     Vital Signs (Most Recent):  Temp: 98.3 °F (36.8 °C) (09/02/20 0815)  Pulse: 81 (09/02/20 0400)  Resp: 19 (09/02/20 0400)  BP: 129/77 (09/02/20 0815)  SpO2: 97 % (09/02/20 0400) Vital Signs (24h Range):  Temp:  [98.1 °F (36.7 °C)-98.8 °F (37.1 °C)] 98.3 °F (36.8 °C)  Pulse:  [78-97] 81  Resp:  [18-19] 19  SpO2:  [97 %-98 %] 97 %  BP: (119-135)/(66-79) 129/77     Weight: 120.2 kg (265 lb)  Body mass index is 34.01 kg/m².    Estimated Creatinine Clearance: 142 mL/min (based on SCr of 0.8 mg/dL).    Physical Exam  Constitutional:       Appearance: Normal appearance. He is obese. He is not ill-appearing or toxic-appearing.   HENT:      Head: Normocephalic and atraumatic.      Right Ear: External ear normal.      Left Ear: External ear normal.      Nose: Nose normal.      Mouth/Throat:      Pharynx: Oropharynx is clear.   Eyes:      General:         Right eye: No discharge.         Left eye: No discharge.      Extraocular Movements: Extraocular movements intact.   Neck:      Musculoskeletal: Normal range of motion. No neck rigidity.   Cardiovascular:      Comments: Bilateral DP and PT pulses diminished  Pulmonary:      Effort: Pulmonary effort is normal. No respiratory distress.   Chest:      Chest wall: No tenderness.   Abdominal:      General: There is no distension.      Palpations: Abdomen is soft.      Tenderness: There is no abdominal tenderness. There is no guarding.   Musculoskeletal:         General: Swelling  (amputation site) present. No tenderness.   Skin:     General: Skin is warm and dry.      Findings: Erythema (amputation site) present.      Comments: 2 wounds near L 1st ray amputation site. Distal wound 1.2x0.7x2.2 cm, probes to bone, purulent drainage, erythematous periwound, periwound macerated, not tender. Medial wound 1x0.7x1 cm, probes to deep soft tissues but not bone, purulent and sanguinous drainage, erythematous periwound, periwound macerated, not tender.    Neurological:      Mental Status: He is alert and oriented to person, place, and time.      Sensory: Sensory deficit (bilateral loss of protective sensation in feet) present.      Gait: Gait abnormal.   Psychiatric:         Mood and Affect: Mood normal.         Behavior: Behavior normal.         Thought Content: Thought content normal.         Judgment: Judgment normal.         Significant Labs:   Blood Culture:   Recent Labs   Lab 07/03/20  1450 07/03/20  1541 07/03/20  2020 09/01/20  2211 09/01/20  2306   LABBLOO No growth after 5 days. No growth after 5 days. No growth after 5 days.  No growth after 5 days. No Growth to date No Growth to date     CBC:   Recent Labs   Lab 09/01/20  1546 09/02/20  0234   WBC 11.46 7.74   HGB 15.2 12.3*   HCT 45.7 36.9*   * 285     CMP:   Recent Labs   Lab 09/01/20  1546 09/02/20  0234   * 133*   K 4.7 4.5   CL 94* 101   CO2 23 22*   * 329*   BUN 21* 20   CREATININE 1.1 0.8   CALCIUM 9.5 8.3*   PROT  --  6.2   ALBUMIN  --  2.4*   BILITOT  --  0.5   ALKPHOS  --  84   AST  --  9*   ALT  --  9*   ANIONGAP 11 10   EGFRNONAA >60.0 >60.0     Lactic Acid: No results for input(s): LACTATE in the last 48 hours.    POCT Glucose:   Recent Labs   Lab 09/01/20 2025 09/02/20  0732   POCTGLUCOSE 300* 250*     Wound Culture:   Recent Labs   Lab 07/03/20  1540 07/04/20  1212 07/14/20  0830 07/28/20  1617 09/01/20  1731   LABAERO No significant isolate No growth  No growth No growth STREPTOCOCCUS AGALACTIAE  (GROUP B)  Rare  Beta-hemolytic streptococci are routinely susceptible to   penicillins,cephalosporins and carbapenems.  * GRAM NEGATIVE VIVIAN  Rare  Identification and susceptibility pending  *       Significant Imaging  X Ray R Foot 9/1: Postoperative changes of the left 1st ray.  Increased soft tissue swelling of the left 1st ray and throughout the left foot.  Additional evaluation, as clinically warranted.   MRI R Foot 9/1: Postoperative changes of resection involving the distal aspects of the 1st ray. MRI findings suggestive of osteomyelitis of the 1st metatarsal bone with 2 sinus tracts extending towards the metatarsal head.  Associated diffuse subcutaneous edema.  No discrete fluid collection to suggest abscess.

## 2020-09-02 NOTE — ASSESSMENT & PLAN NOTE
55 year old male with a diabetic infection of previous right 1st ray amp with evidence of continued osteomyelitis on MRI    -Will obtain ABIs with toe pressures to screen for vascular disease.  His pulses are non-palpable but he is a bit edematous and his doppler signals are triphasic.  -Further recommendations pending non-invasives.  Rest of care per primary

## 2020-09-02 NOTE — PT/OT/SLP EVAL
Physical Therapy Evaluation/Discharge    Patient Name:  Billy Rivera   MRN:  [id number]    Recommendations:     Discharge Recommendations:  (home no needs)   Discharge Equipment Recommendations: none   Barriers to discharge: None    Assessment:     Billy Rivera is a 55 y.o. male admitted with a medical diagnosis of Diabetic foot infection.  He presents with the following impairments/functional limitations:  (no rehab needs) pt rosalia treatment well and is safe to ambulate on unit with RN staff. Pt will be able to discharge home with no needs when medically stable. Pt was admitted to ED from Podiatry clinic.    Rehab Prognosis: Good; patient would benefit from acute skilled PT services to address these deficits and reach maximum level of function.    Recent Surgery: * No surgery found *      Plan:     During this hospitalization, patient to be seen (pt is being discharged from PT) to address the identified rehab impairments via (pt is being discharged from PT) and progress toward the following goals:    · Plan of Care Expires:  09/02/20    Subjective     Chief Complaint: pt had no complaints with gait training.   Patient/Family Comments/goals:  To get better and go home.   Pain/Comfort:  · Pain Rating 1: 0/10  · Pain Rating Post-Intervention 1: 0/10    Patients cultural, spiritual, Protestant conflicts given the current situation: no    Living Environment:  Pt works full-time as  at [employer]. Pt wife works full-time. They live in 1 story with 3 steps and 1 handrail.   Prior to admission, patients level of function was Independent .  Equipment used at home: walker, rolling, cane, quad, bedside commode(Darco shoe).  DME owned (not currently used): none.  Upon discharge, patient will have assistance from wife who can take time off. .    Objective:     Communicated with nurse prior to session.  Patient found supine with peripheral IV  upon PT entry to room.    General Precautions: Standard,  airborne, contact, droplet, fall   Orthopedic Precautions:    Braces: (Darco shoe RLE)     Exams:  · Cognitive Exam:  Patient is oriented to Person, Place, Time and Situation  · RLE ROM: WFL  · RLE Strength: WFL  · LLE ROM: WFL  · LLE Strength: WFL    Functional Mobility:  · Bed Mobility:     · Rolling Right: independence  · Supine to Sit: independence  ·   · Transfers:     · Sit to Stand:  independence with no AD  ·   · Gait: pt received gait training ~ 338 ft Independent pushing rolling IV pole. Pt had mask on with gait training.            AM-PAC 6 CLICK MOBILITY  Total Score:24     Patient left up in chair with all lines intact and call button in reach.    GOALS:   Multidisciplinary Problems     Physical Therapy Goals     Not on file                History:     Past Medical History:   Diagnosis Date    Allergy     CAD (coronary artery disease), native coronary artery 6/25/2013    Cancer     Diabetes mellitus     Diabetes mellitus, type 2     Disorder of kidney and ureter     Heart attack 04/2012    Hx of colon cancer, stage I     Hyperlipidemia     Hypertension     Muscular pain     post-op after colonoscopy    MICHAELA (obstructive sleep apnea)     Retinopathy due to secondary diabetes        Past Surgical History:   Procedure Laterality Date    COLONOSCOPY N/A 2/17/2017    Procedure: COLONOSCOPY;  Surgeon: Simon Gomez MD;  Location: [facility] (4TH FLR);  Service: Endoscopy;  Laterality: N/A;    CORONARY ANGIOPLASTY      INCISION AND DRAINAGE FOOT Right 6/5/2018    Procedure: INCISION AND DRAINAGE, FOOT;  Surgeon: Bridget Santana DPM;  Location: [facility] OR 1ST FLR;  Service: Podiatry;  Laterality: Right;  Request mini C arm in room. request wound vac with medium black sponge    INCISION AND DRAINAGE FOOT Right 6/7/2018    Procedure: INCISION AND DRAINAGE, FOOT;  Surgeon: Emelia Brock DPM;  Location: [facility] OR 2ND FLR;  Service: Podiatry;  Laterality: Right;    INCISION AND DRAINAGE OF ABSCESS  Right 6/2/2018    Procedure: INCISION AND DRAINAGE, ABSCESS;  Surgeon: Bridget Santana DPM;  Location: [facility] OR [address] [place] [address];  Service: General;  Laterality: Right;    REMOVAL OF FOREIGN BODY FROM FOOT Right 6/7/2018    Procedure: REMOVAL, FOREIGN BODY, FOOT;  Surgeon: Emelia Brock DPM;  Location: [facility] OR [address] [place] [address];  Service: Podiatry;  Laterality: Right;    TOE AMPUTATION Left 7/4/2020    Procedure: AMPUTATION, TOE;  Surgeon: Bridget Santana DPM;  Location: [facility] OR [address] [place] [address];  Service: Podiatry;  Laterality: Left;    TONSILLECTOMY         Time Tracking:     PT Received On: 09/02/20  PT Start Time: 1103     PT Stop Time: 1114  PT Total Time (min): 11 min     Billable Minutes: Evaluation 11 min      Tresa Barnes, PT  09/02/2020

## 2020-09-02 NOTE — ASSESSMENT & PLAN NOTE
54 yo M with s/p L hallux amputation 2/2 osteomyelitis 7/2020 now with osteo of the 1st metatarsal shaft with 2 sinus tracts extending towards the 1st metatarsal head on MRI.  On initial presentation in the ED, WBC 11.46 with left shift, ESR 47 .     Plan:  -Will plan for 1st metatarsal resection during this hospital admission once adequate perfusion to heal the wound is confirmed.   -Continue IV abx. ID on board.  -Previous arterial studies 7/2020 with triphasic waveforms throughout except monophasic waveforms in the PT. Vascular on board. Appreciate assistance.  -Continue local wound care in the interim  -Rest of care per primary  -Podiatry will continue to follow.

## 2020-09-02 NOTE — CONSULTS
[facility] - ICU 14 WT  Podiatry  Consult Note    Patient Name: Billy Sheffield Miguel  MRN: [id number]  Admission Date: 9/1/2020  Hospital Length of Stay: 1 days  Attending Physician: Zachary Ansari MD  Primary Care Provider: BRAD Baker MD     Inpatient consult to Podiatry  Consult performed by: Phoebe Murphy MD  Consult ordered by: Amor Echevarria MD        Subjective:     History of Present Illness:  56 yo M with PMHx Type 2 DN, osteomyelitis s/p L hallux amputation 7/2020, PVD, CAD, HLD, and HTN who was referred to the ED from podiatry clinic for worsening purulence and erythema at site of previous amputation. The patient underwent amputation of L hallux 7/2020 with delayed healing since the surgery despite aggressive weekly wound care. Clean margins at the time of surgery had no growth. Repeat cx 7/28 +GBS. Repeat cx yesterday +GBS and Klebsiella. Patient denies any pain. He has neuropathy in bilateral lower extremities at baseline. Denies N/V/F/C. No complaints.     On initial presentation in the ED, WBC 11.46 with left shift, ESR 47 . MRI left forefoot + osteo of the 1st metatarsal bone with 2 sinus tracts extending toward the 1st metatarsal head. Also tested +COVID.     Scheduled Meds:   aspirin  81 mg Oral Daily    atorvastatin  80 mg Oral Daily    carvediloL  12.5 mg Oral BID    cefTRIAXone (ROCEPHIN) IVPB  2 g Intravenous Q24H    enoxaparin  40 mg Subcutaneous Q24H    insulin aspart U-100  5 Units Subcutaneous TIDWM    insulin detemir U-100  40 Units Subcutaneous QHS    lisinopriL  40 mg Oral Daily     Continuous Infusions:   sodium chloride 0.9% 125 mL/hr at 09/02/20 0536     PRN Meds:acetaminophen, dextrose 50%, dextrose 50%, glucagon (human recombinant), glucose, glucose, insulin aspart U-100, melatonin, ondansetron, promethazine (PHENERGAN) IVPB, sodium chloride 0.9%    Review of patient's allergies indicates:   Allergen Reactions    Penicillins Other (See Comments)      PCN allergy as a child - was told he went into a coma. Tolerates Cefazolin without adverse reactions    Shellfish containing products      Other reaction(s): Unknown    Vancomycin Itching     Tolerated vancomycin 7/2020    Bactrim  [sulfamethoxazole-trimethoprim] Rash        Past Medical History:   Diagnosis Date    Allergy     CAD (coronary artery disease), native coronary artery 6/25/2013    Cancer     Diabetes mellitus     Diabetes mellitus, type 2     Disorder of kidney and ureter     Heart attack 04/2012    Hx of colon cancer, stage I     Hyperlipidemia     Hypertension     Muscular pain     post-op after colonoscopy    MICHAELA (obstructive sleep apnea)     Retinopathy due to secondary diabetes      Past Surgical History:   Procedure Laterality Date    COLONOSCOPY N/A 2/17/2017    Procedure: COLONOSCOPY;  Surgeon: Simon Gomez MD;  Location: [facility] ENDO (4TH FLR);  Service: Endoscopy;  Laterality: N/A;    CORONARY ANGIOPLASTY      INCISION AND DRAINAGE FOOT Right 6/5/2018    Procedure: INCISION AND DRAINAGE, FOOT;  Surgeon: Bridget Santana DPM;  Location: [facility] OR 1ST FLR;  Service: Podiatry;  Laterality: Right;  Request mini C arm in room. request wound vac with medium black sponge    INCISION AND DRAINAGE FOOT Right 6/7/2018    Procedure: INCISION AND DRAINAGE, FOOT;  Surgeon: Emelia Brock DPM;  Location: [facility] OR 2ND FLR;  Service: Podiatry;  Laterality: Right;    INCISION AND DRAINAGE OF ABSCESS Right 6/2/2018    Procedure: INCISION AND DRAINAGE, ABSCESS;  Surgeon: Bridget Santana DPM;  Location: [facility] OR 2ND FLR;  Service: General;  Laterality: Right;    REMOVAL OF FOREIGN BODY FROM FOOT Right 6/7/2018    Procedure: REMOVAL, FOREIGN BODY, FOOT;  Surgeon: Emelia Brock DPM;  Location: [facility] OR 2ND FLR;  Service: Podiatry;  Laterality: Right;    TOE AMPUTATION Left 7/4/2020    Procedure: AMPUTATION, TOE;  Surgeon: Bridget Santana DPM;  Location: [facility] OR [address] [place] [address];  Service: Podiatry;   Laterality: Left;    TONSILLECTOMY         Family History     Problem Relation (Age of Onset)    Heart disease Mother, Brother    No Known Problems Father        Tobacco Use    Smoking status: Never Smoker    Smokeless tobacco: Never Used   Substance and Sexual Activity    Alcohol use: Yes     Comment: rare x1 beer-     Drug use: No    Sexual activity: Not Currently     Review of Systems   Constitutional: Negative for chills and fever.   HENT: Negative for congestion, ear pain, rhinorrhea and sneezing.    Eyes: Negative for pain.   Respiratory: Positive for cough. Negative for shortness of breath.    Cardiovascular: Positive for leg swelling. Negative for chest pain.   Gastrointestinal: Positive for vomiting (chronic). Negative for abdominal pain, constipation, diarrhea and nausea.   Endocrine: Negative for polyuria.   Genitourinary: Negative for decreased urine volume, difficulty urinating, dysuria and flank pain.   Musculoskeletal: Negative for back pain, neck pain and neck stiffness.   Skin: Positive for wound. Negative for rash.   Neurological: Positive for numbness. Negative for weakness and headaches.   Psychiatric/Behavioral: Negative for confusion. The patient is not nervous/anxious.      Objective:     Vital Signs (Most Recent):  Temp: 98.3 °F (36.8 °C) (09/02/20 0815)  Pulse: 81 (09/02/20 0400)  Resp: 19 (09/02/20 0400)  BP: 115/63 (09/02/20 1140)  SpO2: 97 % (09/02/20 0400) Vital Signs (24h Range):  Temp:  [98.1 °F (36.7 °C)-98.6 °F (37 °C)] 98.3 °F (36.8 °C)  Pulse:  [80-97] 81  Resp:  [18-19] 19  SpO2:  [97 %-98 %] 97 %  BP: (115-129)/(63-77) 115/63     Weight: 120.2 kg (265 lb)  Body mass index is 34.01 kg/m².    Foot Exam    Right Foot/Ankle     Inspection and Palpation  Tenderness: none   Swelling: (Diffuse edema to right foot )  Skin Exam: drainage, ulcer and erythema;     Neurovascular  Dorsalis pedis: absent  Posterior tibial: absent  Saphenous nerve sensation: diminished  Tibial nerve  sensation: diminished  Superficial peroneal nerve sensation: diminished  Deep peroneal nerve sensation: diminished  Sural nerve sensation: diminished      Left Foot/Ankle      Inspection and Palpation  Tenderness: none   Swelling: none   Skin Exam: skin intact;     Neurovascular  Dorsalis pedis: absent  Posterior tibial: absent  Saphenous nerve sensation: diminished  Tibial nerve sensation: diminished  Superficial peroneal nerve sensation: diminished  Deep peroneal nerve sensation: diminished  Sural nerve sensation: diminished            Laboratory:  CBC:   Recent Labs   Lab 09/02/20  0234   WBC 7.74   RBC 4.29*   HGB 12.3*   HCT 36.9*      MCV 86   MCH 28.7   MCHC 33.3     CRP:   Recent Labs   Lab 09/01/20  1546   .9*     ESR:   Recent Labs   Lab 09/01/20  1546   SEDRATE 47*     Wound Cultures:   Recent Labs   Lab 07/03/20  1540 07/04/20  1212 07/14/20  0830 07/28/20  1617 09/01/20  1731   LABAERO No significant isolate No growth  No growth No growth STREPTOCOCCUS AGALACTIAE (GROUP B)  Rare  Beta-hemolytic streptococci are routinely susceptible to   penicillins,cephalosporins and carbapenems.  * KLEBSIELLA PNEUMONIAE  Rare  Susceptibility pending  *  STREPTOCOCCUS AGALACTIAE (GROUP B)  Few  Beta-hemolytic streptococci are routinely susceptible to   penicillins,cephalosporins and carbapenems.  Susceptibility testing not routinely performed  *     Microbiology Results (last 7 days)     Procedure Component Value Units Date/Time    Aerobic culture (Specify Source) **CANNOT BE ORDERED AS STAT* [[id number]]  (Abnormal) Collected: 09/01/20 1731    Order Status: Completed Specimen: Incision site from Toe, Left Foot Updated: 09/02/20 1351     Aerobic Bacterial Culture KLEBSIELLA PNEUMONIAE  Rare  Susceptibility pending        STREPTOCOCCUS AGALACTIAE (GROUP B)  Few  Beta-hemolytic streptococci are routinely susceptible to   penicillins,cephalosporins and carbapenems.  Susceptibility testing not routinely  performed      Blood culture [[id number]] Collected: 09/01/20 2306    Order Status: Completed Specimen: Blood Updated: 09/02/20 0945     Blood Culture, Routine No Growth to date    Blood culture [[id number]] Collected: 09/01/20 2211    Order Status: Completed Specimen: Blood from Peripheral, Antecubital, Right Updated: 09/02/20 0515     Blood Culture, Routine No Growth to date        Specimen (12h ago, onward)    None          Diagnostic Results:  I have reviewed all pertinent imaging results/findings within the past 24 hours.    Clinical Findings:  Ulcer location: 1.2x 1.5x 0.4cm  left, hallux amp site. Serous drainage. Probes to bone. New blow out lesion to medial 1st metatarsal head. Probes to bone.     Assessment/Plan:     Acute osteomyelitis of left foot  56 yo M with s/p L hallux amputation 2/2 osteomyelitis 7/2020 now with osteo of the 1st metatarsal shaft with 2 sinus tracts extending towards the 1st metatarsal head on MRI.  On initial presentation in the ED, WBC 11.46 with left shift, ESR 47 .     Plan:  -Will plan for 1st metatarsal resection during this hospital admission once adequate perfusion to heal the wound is confirmed.   -Continue IV abx. ID on board.  -Previous arterial studies 7/2020 with triphasic waveforms throughout except monophasic waveforms in the PT. Vascular on board. Appreciate assistance.  -Continue local wound care in the interim  -Rest of care per primary  -Podiatry will continue to follow.           Thank you for your consult. I will follow-up with patient. Please contact us if you have any additional questions.      Phoebe Murphy DPM  [facility]- ICU 14 WT  Podiatry PGY2  Pager: [phone]  Spectra:[phone]

## 2020-09-02 NOTE — HPI
The patient is a very pleasant 56 yo M with PMHx DM type 2, osteomyelitis of L 1st toe s/p amputation, PVD, CAD, HLD, and HTN who presents from podiatry clinic for worsening purulence and erythema at site of previous amputation. The patient underwent amputation of L 1st toe 7/4. Since then, he has been on multiple courses of abx, and he has had delayed wound healing. He was referred to the ED for further management of non-healing amputation site. Pt. Denies pain as he has veery little feeling in his feet. He denies any fevers, chills, nausea, vomiting, diarrhea or malaise. He reports feeling his normal self. He tested positive for COVID-19 during routine screening while in the ED. He is a life long non-smoker.  His glucose seems poorly controlled. He is ambulatory on the foot.  He blames it all on poorly fitting boots.  He says he does not have PVD.  Previously he had cellulitis of the RLE. He has an ultrasound with VASILIY from 2018.  The VASILIY on the R could not be obtained due to overlying cellulitis, but overall it was read as no hemodynamically significant stenosis in either leg.

## 2020-09-02 NOTE — PROGRESS NOTES
Hospital Medicine  Progress Note  [facility]      Patient Name: Billy Sheffield Miguel  MRN:  [id number]  Hospital Medicine Team: [facility] [place] [facility] G Zachary Ansari MD  Date of Admission:  9/1/2020     Length of Stay:  LOS: 1 day       Principal Problem:  Diabetic foot infection      HPI:  56 yo M with PMHx DM type 2, osteomyelitis of L 1st toe s/p amputation, PVD, CAD, HLD, and HTN who presents from podiatry clinic for worsening purulence and erythema at site of previous amputation. The patient underwent amputation of L 1st toe 7/4. Since then, he has been on multiple courses of abx, and he has had delayed wound healing. He sees podiatry every Tuesday for wound care. Today on evaluation, they noted significant purulence at the wound site and worsening surrounding erythema and warmth. He was referred to the ED for further management. Pt. Denies pain as he has veery little feeling in his feet. He denies any fevers, chills, nausea, vomiting, diarrhea or malaise. He reports feeling his normal self. He tested positive for COVID-19 during routine screening while in the ED. On further questioning, he does report a dry cough for about a month now. He denies any other respiratory symptoms or changes in taste/smell. No known sick contacts.    Hospital Course:  Management of infected left foot wound and COVID-19 infection.     Interval History:     Patient sitting in bed, no acute distress. Denies fever, chills, SOB. Denies pain in his feet. Tolerating diet and having regular bowel movements.     Review of Systems:  Constitutional: no fever or chills  Respiratory: Positive for cough, no SOB  Cardiovascular: no chest pain or palpitations  Gastrointestinal: no nausea or vomiting, no abdominal pain or change in bowel habits  Genitourinary: no hematuria or dysuria  Integument/Breast: Positive for erythema and drainage at wound site as above  Hematologic/Lymphatic: no easy bruising or  lymphadenopathy  Musculoskeletal: no arthralgias or myalgias  Neurological: no seizures or tremors  Behavioral/Psych: no depression or anxiety    Inpatient Medications:    Current Facility-Administered Medications:     0.9%  NaCl infusion, , Intravenous, Continuous, Amor Echevarria MD, Last Rate: 125 mL/hr at 09/02/20 0536    acetaminophen tablet 650 mg, 650 mg, Oral, Q4H PRN, Amor Echevarria MD    aspirin EC tablet 81 mg, 81 mg, Oral, Daily, Amor Echevarria MD, 81 mg at 09/02/20 0809    atorvastatin tablet 80 mg, 80 mg, Oral, Daily, Amor Echevarria MD, 80 mg at 09/02/20 0809    carvediloL tablet 12.5 mg, 12.5 mg, Oral, BID, Amor Echevarria MD, 12.5 mg at 09/02/20 0809    cefTRIAXone (ROCEPHIN) 2 g/50 mL D5W IVPB, 2 g, Intravenous, Q24H, Dominic Paul MD, 2 g at 09/02/20 1838    dextrose 50% injection 12.5 g, 12.5 g, Intravenous, PRN, Amor Echevarria MD    dextrose 50% injection 25 g, 25 g, Intravenous, PRN, Amor Echevarria MD    enoxaparin injection 40 mg, 40 mg, Subcutaneous, Q24H, Amor Echevarria MD, 40 mg at 09/02/20 1620    glucagon (human recombinant) injection 1 mg, 1 mg, Intramuscular, PRN, Amor Echevarria MD    glucose chewable tablet 16 g, 16 g, Oral, PRN, Amor Echevarria MD    glucose chewable tablet 24 g, 24 g, Oral, PRN, Amor Echevarria MD    insulin aspart U-100 pen 1-10 Units, 1-10 Units, Subcutaneous, QID (AC + HS) PRN, Amor Echevarria MD, 6 Units at 09/02/20 1617    insulin aspart U-100 pen 5 Units, 5 Units, Subcutaneous, TIDWM, Zachary Ansari MD, 5 Units at 09/02/20 1617    insulin detemir U-100 pen 40 Units, 40 Units, Subcutaneous, QHS, Amor Echevarria MD    lisinopriL tablet 40 mg, 40 mg, Oral, Daily, Amor Echevarria MD, 40 mg at 09/02/20 0809    melatonin tablet 6 mg, 6 mg, Oral, Nightly PRN, Amor Echevarria MD    ondansetron injection 4 mg, 4 mg, Intravenous, Q8H PRN, Amor Echevarria MD    promethazine (PHENERGAN) 6.25 mg in dextrose 5 % 50 mL IVPB, 6.25 mg,  "Intravenous, Q6H PRN, Amor Echevarria MD    sodium chloride 0.9% flush 10 mL, 10 mL, Intravenous, PRN, Amor Echevarria MD      Physical Exam:    No intake or output data in the 24 hours ending 09/02/20 2022  Wt Readings from Last 3 Encounters:   09/02/20 120.2 kg (265 lb)   09/01/20 120.7 kg (266 lb 1.5 oz)   08/24/20 118.8 kg (262 lb)       BP (!) 149/85   Pulse 81   Temp 98.5 °F (36.9 °C) (Oral)   Resp 19   Ht 6' 2.02" (1.88 m)   Wt 120.2 kg (265 lb)   SpO2 97%   BMI 34.01 kg/m²       General appearance: no distress, pt. Resting comfortably  Mental status: Alert and oriented x 3  HEENT:  conjunctivae/corneas clear, PERRL  Neck: supple, thyroid not enlarged  Pulm:   normal respiratory effort, CTA B, no c/w/r  Card: RRR, S1, S2 normal, no murmur, click, rub or gallop  Abd: soft, NT, ND, BS present; no masses, no organomegaly  Ext: L foot erythema and chronic skin changes extending to below ankle. Purulent discharge noted at incision site with surrounding edema and warmth  Pulses: 2+, symmetric  Skin: color, texture, turgor normal. No rashes or lesions  Neuro: CN II-XII grossly intact, no focal numbness or weakness, normal strength and tone     Laboratory:  Lab Results   Component Value Date    [id number] Positive (A) 09/01/2020       Recent Labs   Lab 09/01/20  1546 09/02/20  0234   WBC 11.46 7.74   LYMPH 9.2*  1.1 22.2  1.7   HGB 15.2 12.3*   HCT 45.7 36.9*   * 285     Recent Labs   Lab 09/01/20  1546 09/02/20  0234   * 133*   K 4.7 4.5   CL 94* 101   CO2 23 22*   BUN 21* 20   CREATININE 1.1 0.8   * 329*   CALCIUM 9.5 8.3*     Recent Labs   Lab 09/01/20  1546 09/02/20  0234   ALKPHOS  --  84   ALT  --  9*   AST  --  9*   ALBUMIN  --  2.4*   PROT  --  6.2   BILITOT  --  0.5   INR 1.0  --         Recent Labs     09/01/20  1546 09/02/20  0234   FERRITIN  --  470*   .9*  --        All labs within the last 24 hours were reviewed.     Microbiology:  Microbiology Results (last 7 " days)     Procedure Component Value Units Date/Time    Aerobic culture (Specify Source) **CANNOT BE ORDERED AS STAT* [[id number]]  (Abnormal) Collected: 09/01/20 1731    Order Status: Completed Specimen: Incision site from Toe, Left Foot Updated: 09/02/20 1351     Aerobic Bacterial Culture KLEBSIELLA PNEUMONIAE  Rare  Susceptibility pending        STREPTOCOCCUS AGALACTIAE (GROUP B)  Few  Beta-hemolytic streptococci are routinely susceptible to   penicillins,cephalosporins and carbapenems.  Susceptibility testing not routinely performed      Blood culture [[id number]] Collected: 09/01/20 2306    Order Status: Completed Specimen: Blood Updated: 09/02/20 0945     Blood Culture, Routine No Growth to date    Blood culture [[id number]] Collected: 09/01/20 2211    Order Status: Completed Specimen: Blood from Peripheral, Antecubital, Right Updated: 09/02/20 0515     Blood Culture, Routine No Growth to date            Imaging      No results found for this or any previous visit.    MRI Foot (Forefoot) Left Without Contrast  Narrative: EXAMINATION:  MRI FOOT (FOREFOOT) LEFT WITHOUT CONTRAST    CLINICAL HISTORY:  Osteomyelitis suspected, diabetic;    TECHNIQUE:  Multiplanar, multisequence MR imaging of the left forefoot without the use of intravenous gadolinium IV contrast.    COMPARISON:  Foot radiograph 09/01/2020.    FINDINGS:  There are postsurgical changes of resection of the 1st toe.    Lisfranc Ligament: Intact.    Alignment: Normal.    Bones: No fracture or stress reaction. Bone marrow edema of the distal 1st metatarsal with areas of loss of normal cortical T1 low signal, particularly of the head (series 6, image 19 and image 20).    Tendons: Extensor tendons of the toes are normal in signal characteristics. Flexor tendons of the toes are normal in signal characteristics.    MTP Joints: Normal. No evidence of effusion, synovitis, OCD.  Degenerative changes consistent with osteophytes formation and joint space  narrowing.    Soft Tissues: Diffuse subcutaneous soft tissue edema throughout the mid and forefoot.  Two linear STIR signal hyperintensity extending from medial and anterior skin surface towards the head of the 1st metatarsal (series 7, image 12 and image 15), consistent with sinus tracts.    Muscles: Diffuse edema signal, suggestive of possible myositis.  Impression: Postoperative changes of resection involving the distal aspects of the 1st ray.    MRI findings suggestive of osteomyelitis of the 1st metatarsal bone with 2 sinus tracts extending towards the metatarsal head.  Associated diffuse subcutaneous edema.  No discrete fluid collection to suggest abscess.    This report was flagged in Epic as abnormal.    Electronically signed by resident: Dwayne Desir  Date:    09/01/2020  Time:    22:24    Electronically signed by: Aaron Weiner MD  Date:    09/01/2020  Time:    22:53  X-Ray Chest AP Portable  Narrative: EXAMINATION:  XR CHEST AP PORTABLE    CLINICAL HISTORY:  covid 19 positive;    TECHNIQUE:  Single frontal view of the chest was performed.    COMPARISON:  June 9, 2018    FINDINGS:  Single frontal view of the chest reveals the lungs appear to be well aerated.  No indication of a consolidative process or pleural effusion.  No specific patchy infiltrates are appreciated.  No pulmonary nodules.  The heart is normal in size and contour.  No evidence of a free air under the diaphragm.  Impression: No obvious evidence of an acute pulmonary process.    Electronically signed by: Billy Florian  Date:    09/01/2020  Time:    22:30  X-Ray Foot Complete Left  Narrative: EXAMINATION:  XR FOOT COMPLETE 3 VIEW LEFT    CLINICAL HISTORY:  Osteomyelitis, unspecified    TECHNIQUE:  AP, lateral and oblique views of the left foot were performed.    COMPARISON:  07/04/2020.    FINDINGS:  There are changes of resection of the 1st ray with the 1st metatarsal remaining.  The surgical margins appear unremarkable.  Previous soft  tissue lucency adjacent to the surgical margin is no longer visualized.  There is however increased diffuse soft tissue swelling of the 1st ray and throughout the left foot.    The remainder of the osseous structures are within normal limits.  The joint spaces are maintained.  There is a plantar calcaneal spur present.  There is an enthesophyte at the insertion of the Achilles tendon.  There are vascular calcifications.  Impression: Postoperative changes of the left 1st ray.  Increased soft tissue swelling of the left 1st ray and throughout the left foot.  Additional evaluation, as clinically warranted.    Electronically signed by: Aaron Weiner MD  Date:    09/01/2020  Time:    18:46      All imaging within the last 24 hours was reviewed.     Assessment and Plan:    Active Hospital Problems    Diagnosis  POA    *Diabetic foot infection [E11.628, L08.9]  Unknown    Acute osteomyelitis of left foot [M86.172]  Yes    COVID-19 virus detected [U07.1]  Unknown    PVD (peripheral vascular disease) [I73.9]  Yes    Uncontrolled type II diabetes mellitus [E11.65]  Yes    Coronary artery disease involving native coronary artery of native heart without angina pectoris [I25.10]  Yes    Dyslipidemia [E78.5]  Yes    HTN (hypertension) [I10]  Yes      Resolved Hospital Problems   No resolved problems to display.       Scheduled Meds:   aspirin  81 mg Oral Daily    atorvastatin  80 mg Oral Daily    carvediloL  12.5 mg Oral BID    cefTRIAXone (ROCEPHIN) IVPB  2 g Intravenous Q24H    enoxaparin  40 mg Subcutaneous Q24H    insulin aspart U-100  5 Units Subcutaneous TIDWM    insulin detemir U-100  40 Units Subcutaneous QHS    lisinopriL  40 mg Oral Daily     Continuous Infusions:   sodium chloride 0.9% 125 mL/hr at 09/02/20 0536     PRN Meds:.acetaminophen, dextrose 50%, dextrose 50%, glucagon (human recombinant), glucose, glucose, insulin aspart U-100, melatonin, ondansetron, promethazine (PHENERGAN) IVPB, sodium  chloride 0.9%      COVID-19 Virus Infection  Viral Pneumonia due to COVID-19    - COVID-19 testing: positive on 9/1/2020  - Isolation: Airborne/Droplet. Surgical mask on patient. Notify Infection Control  - Diagnostics: Trend Q48hrs if stable, more frequently if patient decompensating     Laboratory/Study Frequency Result   CBC Admit & Q48h No leukocytosis    CMP Admit & Q48h Na 128   D-dimer Admit & Q48h    Ferritin Admit & Q48h 470   CRP Admit & Q48h 109   CPK Admit & Q24h if elevated    LDH Admit & Q48h    Vitamin D Admit    BNP Admit    Troponin Admit & Q6h if elevated    Procalcitonin Admit      Lymphopenia, hyponatremia, hyperferritinemia, elevated troponin, elevated d-dimer, age, and medical comorbidities are significant predictors of poor clinical outcome    - Management: per [facility] COVID Treatment Protocol (4/15/20)    - Monitoring:   - Telemetry & Continuous Pulse Oximetry if > 3L via NC    - Nutrition:    - Multivitamin PO daily   - Boost supplement   - Vitamin D 1000IU daily if deficient   - Ascorbic acid 500mg PO bid    - Supportive Care:   - acetaminophen 650mg PO Q6hr PRN fever/headache   - loperamide PRN viral diarrhea   - IVF if indicated, restrictive strategy preferred, no maintenance IV if able   - VTE PPx: enoxaparin or heparin SQ unless contraindicated    - Antibiotics:   - ceftriaxone 1g Q24h x 5 days  - azithromycin 500mg IV/PO x3    - Investigational Therapies:  - atorvastatin 40mg po daily   - not a candidate for Remdesivir      Acute Hypoxemic Respiratory Failure  - currently not in respiratory failure. On room air   - Order RT consult via Respiratory Communication for COVID Protocols  - Order Incentive Spirometer Q4h  - Order Flutter Valve Q4h  - Continuous Pulse Oximetry  - Goal SpO2 92-96%  - Supplemental O2 via LFNC, VentiMask, or HFNC (see Respiratory Support Oxygen Therapies)  - If wheezing, albuterol INH Q6h scheduled & PRN  - Proning Protocol if patient is a candidate (see HM  Proning Protocol)   - GCS >13, able to self-prone  - If deterioration, may warrant trial of NIPPV and transfer to [facility] pressure room or immediate ICU consult  - not a candidate for Dexamethasone    Diabetic Foot Infection  -Patient with purulent drainage from previous L 1st metatarsal Head amputation site with worsening surrounding erythema and warmth  - patient currently afebrile, WBC WNL, and vitals stable. Does have noted .9, increased from WNL 1 month ago  - X-ray shows soft tissue edema, not evidence of osteo. MRI ordered to further characterize extent of infection  - Podiatry following. apprec recs  -- Will plan for 1st metatarsal resection during this hospital admission once adequate perfusion to heal the wound is confirmed.   -- Previous arterial studies 7/2020 with triphasic waveforms throughout except monophasic waveforms in the PT.  - NPO at [facility] for OR on 9/3/20  - Vascular surgery consulted. apprec recs  -- Will obtain ABIs with toe pressures to screen for vascular disease.  His pulses are non-palpable but he is a bit edematous and his doppler signals are triphasic.  -- Further recommendations pending non-invasives.   - ID consulted. apprec recs   -- Stop metronidazole and change ceftriaxone to 2g [id number].  -- followup wound culture (based on superficial swab of pus expressed manually from wound)      Diabetes mellitus with insulin therapy  - Hgb A1C 11.5 8/2020  - Home medication: Glargine 55 U HS and metformin 1 g BID, humulin 20 TID WM  -  on presentation  - levemir 40 units with MDSSI    - To be NPO at midnight. Monitor BG and titrate as necessary     Dyslipidemia associated with type 2 diabetes mellitus  - Continue home medication Atorvastatin     PVD (peripheral vascular disease)  - Continue home medications ASA and atorvastatin     Coronary artery disease involving native coronary artery of native heart without angina pectoris  -No acute issues  -Continue home ASA, statin     HTN  "(hypertension)  -Continue home meds: carvedilol 12.5 and lisinopril 40mg    Advance Care Planning  Goals of care, counseling/discussion: Full code  Advance Care Planning     If patient transitions to Comfort-Focused Care, please place "Nurse Communication: End of Life Care, family members allowed to visit, including spouse/partner and adult children [please list names]. Please ask family to visit as a group and leave as a group.       VTE High Risk Prophylaxis:   VTE Risk Mitigation (From admission, onward)         Ordered     enoxaparin injection 40 mg  Every 24 hours      09/01/20 1810     IP VTE HIGH RISK PATIENT  Once      09/01/20 1810                  Subsequent Inpatient Hospital Care    Level 3 [id number] Total visit time was 35 minutes or greater with greater than 50% of time spent in counseling and coordination of care.           High Risk Conditions:  Patient has a condition that poses threat to life and bodily function: Osteomyelitis and Diabetic foot infection       Discharge Planning   DARVIN: 9/7/2020     Code Status: Full Code   Is the patient medically ready for discharge?:     Reason for patient still in hospital (select all that apply): Patient trending condition, Treatment, Imaging and Consult recommendations   Plan for foot surgery on 9/3/20  Discharge Plan A: Home Health, Home with family              Zachary Ansari MD  Hospital Medicine Staff  Pager: [phone]; Spectra: [phone]          "

## 2020-09-02 NOTE — PT/OT/SLP EVAL
"Occupational Therapy   Co-Evaluation and Discharge Note    Name: Billy Rivera  MRN: [id number]  Admitting Diagnosis:  Diabetic foot infection     * No surgery found *    Recommendations:     Discharge Recommendations: home  Discharge Equipment Recommendations:  none  Barriers to discharge:  None    Assessment:     Billy Rivera is a 55 y.o. male with a medical diagnosis of Diabetic foot infection. Patient is agreeable to participate and tolerates evaluation well. At this time, patient is functioning at his prior level of function and does not require further acute OT services.     Plan:     During this hospitalization, patient does not require further acute OT services.  Please re-consult if situation changes.    · Plan of Care Reviewed with: patient    Subjective     Chief Complaint: None reported  Patient/Family Comments/goals: To go home    Occupational Profile:  Living Environment: Patient lives with his wife in a [facility] with 3 DA, bilateral handrails (can only reach 1 at a time). His bathroom has a tub/shower combination.  Previous level of function: Independent PTA  Roles and Routines: . Patient drives and works as a  at [employer]. He enjoys "lots of things."  Equipment Used at home:  bedside commode, bath bench, cane, quad, walker, rolling, wheelchair  Assistance upon Discharge: Wife able to assist upon d/c    Pain/Comfort:  · Pain Rating 1: 0/10    Patients cultural, spiritual, Religion conflicts given the current situation: no    Objective:     Communicated with: RN prior to session.  Patient found HOB elevated with peripheral IV upon OT entry to room.    General Precautions: Standard, airborne, contact, droplet, fall   Orthopedic Precautions:N/A   Braces: (Darco shoe RLE)     Occupational Performance:    Bed Mobility:    · Patient completed Supine to Sit to R side EOB with independence  · Patient completed Scooting anteriorly to EOB for foot placement on floor " with independence    Functional Mobility/Transfers:  · Patient completed Sit <> Stand Transfer with independence with no assistive device   · Functional Mobility: ~338' independently with patient managing IV pole    Activities of Daily Living:  · Lower Body Dressing: set-up assistance Patient donned shoes (regular and his Darco) while sitting EOB with set-up assist.    Cognitive/Visual Perceptual:  Cognitive/Psychosocial Skills:     -       Oriented to: Person, Place, Time and Situation   -       Follows Commands/attention:Follows multistep  commands    Physical Exam:  Upper Extremity Range of Motion:     -       Right Upper Extremity: WNL  -       Left Upper Extremity: WNL  Upper Extremity Strength:    -       Right Upper Extremity: WNL  -       Left Upper Extremity: WNL    AMPAC 6 Click ADL:  AMPAC Total Score: 24    Treatment & Education:  Role of OT/evaluation  Educated on importance of sitting UIC/OOB activity with staff assistance while in acute setting to prevent debility  Discussed d/c from skilled OT services with patient in agreement - no concerns/questions within OT scope of practice  Call button for assistance  Education:    Patient left up in chair with all lines intact, call button in reach and RN notified    GOALS:   Multidisciplinary Problems     Occupational Therapy Goals     Not on file          Multidisciplinary Problems (Resolved)        Problem: Occupational Therapy Goal    Goal Priority Disciplines Outcome Interventions   Occupational Therapy Goal   (Resolved)     OT, PT/OT Met                    History:     Past Medical History:   Diagnosis Date    Allergy     CAD (coronary artery disease), native coronary artery 6/25/2013    Cancer     Diabetes mellitus     Diabetes mellitus, type 2     Disorder of kidney and ureter     Heart attack 04/2012    Hx of colon cancer, stage I     Hyperlipidemia     Hypertension     Muscular pain     post-op after colonoscopy    MICHAELA (obstructive sleep  apnea)     Retinopathy due to secondary diabetes        Past Surgical History:   Procedure Laterality Date    COLONOSCOPY N/A 2/17/2017    Procedure: COLONOSCOPY;  Surgeon: Simon Gomez MD;  Location: [facility] ENDO (4TH FLR);  Service: Endoscopy;  Laterality: N/A;    CORONARY ANGIOPLASTY      INCISION AND DRAINAGE FOOT Right 6/5/2018    Procedure: INCISION AND DRAINAGE, FOOT;  Surgeon: Bridget Santana DPM;  Location: [facility] OR 1ST FLR;  Service: Podiatry;  Laterality: Right;  Request mini C arm in room. request wound vac with medium black sponge    INCISION AND DRAINAGE FOOT Right 6/7/2018    Procedure: INCISION AND DRAINAGE, FOOT;  Surgeon: Emelia Brock DPM;  Location: [facility] OR 2ND FLR;  Service: Podiatry;  Laterality: Right;    INCISION AND DRAINAGE OF ABSCESS Right 6/2/2018    Procedure: INCISION AND DRAINAGE, ABSCESS;  Surgeon: Bridget Santana DPM;  Location: [facility] OR 2ND FLR;  Service: General;  Laterality: Right;    REMOVAL OF FOREIGN BODY FROM FOOT Right 6/7/2018    Procedure: REMOVAL, FOREIGN BODY, FOOT;  Surgeon: Emelia Brock DPM;  Location: [facility] OR 2ND FLR;  Service: Podiatry;  Laterality: Right;    TOE AMPUTATION Left 7/4/2020    Procedure: AMPUTATION, TOE;  Surgeon: Bridget Santana DPM;  Location: [facility] OR 2ND FLR;  Service: Podiatry;  Laterality: Left;    TONSILLECTOMY         Time Tracking:     OT Date of Treatment: 09/02/20  OT Start Time: 1103  OT Stop Time: 1114  OT Total Time (min): 11 min    Billable Minutes:Evaluation 11 minutes    Sherly Watson OT  9/2/2020

## 2020-09-02 NOTE — RESEARCH
Billy Rivera is currently being screened for the COVID ATTACC Trial (2020.169, Dr Sal Quinones PI). This study randomizes patients between therapeutic dose heparin (enoxaparin preferred) and usual care (including prophylactic doses).  Dr. Ansari, the attending, agrees that the patient is a good candidate for the study.     An update to eligibility and consent status will be made. Please contact [phone] Arabella Narvaez with questions.

## 2020-09-02 NOTE — PLAN OF CARE
Spoke with patient for remote d/c planning assessment due to Covid-19. Patient lives with spouse in a single story home with 3 steps to enter. PCP and Pharmacy verfiied. Patient Independent with assistive devices and has a RW, Cane, W/C, BSC, and shower chair in home. Patient reports [facility]  visiting for wound care and would like agency to continue services at discharge. Will continue to follow for discharge needs.   09/02/20 1156   Discharge Assessment   Assessment Type Discharge Planning Assessment   Confirmed/corrected address and phone number on facesheet? Yes   Assessment information obtained from? Patient   Expected Length of Stay (days) 5   Communicated expected length of stay with patient/caregiver yes   Prior to hospitilization cognitive status: Alert/Oriented   Prior to hospitalization functional status: Independent;Assistive Equipment   Current cognitive status: Alert/Oriented   Current Functional Status: Independent;Assistive Equipment   Facility Arrived From: Home   Lives With spouse   Able to Return to Prior Arrangements yes   Is patient able to care for self after discharge? Yes   Who are your caregiver(s) and their phone number(s)? Toña Rivera (Spouse)    Patient's perception of discharge disposition home or selfcare   Readmission Within the Last 30 Days no previous admission in last 30 days   Patient currently being followed by outpatient case management? No   Patient currently receives any other outside agency services? No   Equipment Currently Used at Home walker, rolling;cane, quad;wheelchair;bedside commode;shower chair   Do you have any problems affording any of your prescribed medications? No   Is the patient taking medications as prescribed? yes   Does the patient have transportation home? Yes   Transportation Anticipated family or friend will provide   Does the patient receive services at the [facility]? No   Discharge Plan A Home Health;Home with family   Discharge Plan  B Home with family;Home Health   DME Needed Upon Discharge  none   Patient/Family in Agreement with Plan yes

## 2020-09-02 NOTE — H&P (VIEW-ONLY)
[facility] - ICU 14 WT  Podiatry  Consult Note    Patient Name: Billy Sheffield Miguel  MRN: [id number]  Admission Date: 9/1/2020  Hospital Length of Stay: 1 days  Attending Physician: Zachary Ansari MD  Primary Care Provider: BRAD Baker MD     Inpatient consult to Podiatry  Consult performed by: Phoebe Murphy MD  Consult ordered by: Amor Echevarria MD        Subjective:     History of Present Illness:  56 yo M with PMHx Type 2 DN, osteomyelitis s/p L hallux amputation 7/2020, PVD, CAD, HLD, and HTN who was referred to the ED from podiatry clinic for worsening purulence and erythema at site of previous amputation. The patient underwent amputation of L hallux 7/2020 with delayed healing since the surgery despite aggressive weekly wound care. Clean margins at the time of surgery had no growth. Repeat cx 7/28 +GBS. Repeat cx yesterday +GBS and Klebsiella. Patient denies any pain. He has neuropathy in bilateral lower extremities at baseline. Denies N/V/F/C. No complaints.     On initial presentation in the ED, WBC 11.46 with left shift, ESR 47 . MRI left forefoot + osteo of the 1st metatarsal bone with 2 sinus tracts extending toward the 1st metatarsal head. Also tested +COVID.     Scheduled Meds:   aspirin  81 mg Oral Daily    atorvastatin  80 mg Oral Daily    carvediloL  12.5 mg Oral BID    cefTRIAXone (ROCEPHIN) IVPB  2 g Intravenous Q24H    enoxaparin  40 mg Subcutaneous Q24H    insulin aspart U-100  5 Units Subcutaneous TIDWM    insulin detemir U-100  40 Units Subcutaneous QHS    lisinopriL  40 mg Oral Daily     Continuous Infusions:   sodium chloride 0.9% 125 mL/hr at 09/02/20 0536     PRN Meds:acetaminophen, dextrose 50%, dextrose 50%, glucagon (human recombinant), glucose, glucose, insulin aspart U-100, melatonin, ondansetron, promethazine (PHENERGAN) IVPB, sodium chloride 0.9%    Review of patient's allergies indicates:   Allergen Reactions    Penicillins Other (See Comments)      PCN allergy as a child - was told he went into a coma. Tolerates Cefazolin without adverse reactions    Shellfish containing products      Other reaction(s): Unknown    Vancomycin Itching     Tolerated vancomycin 7/2020    Bactrim  [sulfamethoxazole-trimethoprim] Rash        Past Medical History:   Diagnosis Date    Allergy     CAD (coronary artery disease), native coronary artery 6/25/2013    Cancer     Diabetes mellitus     Diabetes mellitus, type 2     Disorder of kidney and ureter     Heart attack 04/2012    Hx of colon cancer, stage I     Hyperlipidemia     Hypertension     Muscular pain     post-op after colonoscopy    MICHAELA (obstructive sleep apnea)     Retinopathy due to secondary diabetes      Past Surgical History:   Procedure Laterality Date    COLONOSCOPY N/A 2/17/2017    Procedure: COLONOSCOPY;  Surgeon: Simon Gomez MD;  Location: [facility] ENDO (4TH FLR);  Service: Endoscopy;  Laterality: N/A;    CORONARY ANGIOPLASTY      INCISION AND DRAINAGE FOOT Right 6/5/2018    Procedure: INCISION AND DRAINAGE, FOOT;  Surgeon: Bridget Santana DPM;  Location: [facility] OR 1ST FLR;  Service: Podiatry;  Laterality: Right;  Request mini C arm in room. request wound vac with medium black sponge    INCISION AND DRAINAGE FOOT Right 6/7/2018    Procedure: INCISION AND DRAINAGE, FOOT;  Surgeon: Emelia Brock DPM;  Location: [facility] OR 2ND FLR;  Service: Podiatry;  Laterality: Right;    INCISION AND DRAINAGE OF ABSCESS Right 6/2/2018    Procedure: INCISION AND DRAINAGE, ABSCESS;  Surgeon: Bridget Santana DPM;  Location: [facility] OR 2ND FLR;  Service: General;  Laterality: Right;    REMOVAL OF FOREIGN BODY FROM FOOT Right 6/7/2018    Procedure: REMOVAL, FOREIGN BODY, FOOT;  Surgeon: Emelia Brock DPM;  Location: [facility] OR 2ND FLR;  Service: Podiatry;  Laterality: Right;    TOE AMPUTATION Left 7/4/2020    Procedure: AMPUTATION, TOE;  Surgeon: Bridget Santana DPM;  Location: [facility] OR [address] [place] [address];  Service: Podiatry;   Laterality: Left;    TONSILLECTOMY         Family History     Problem Relation (Age of Onset)    Heart disease Mother, Brother    No Known Problems Father        Tobacco Use    Smoking status: Never Smoker    Smokeless tobacco: Never Used   Substance and Sexual Activity    Alcohol use: Yes     Comment: rare x1 beer-     Drug use: No    Sexual activity: Not Currently     Review of Systems   Constitutional: Negative for chills and fever.   HENT: Negative for congestion, ear pain, rhinorrhea and sneezing.    Eyes: Negative for pain.   Respiratory: Positive for cough. Negative for shortness of breath.    Cardiovascular: Positive for leg swelling. Negative for chest pain.   Gastrointestinal: Positive for vomiting (chronic). Negative for abdominal pain, constipation, diarrhea and nausea.   Endocrine: Negative for polyuria.   Genitourinary: Negative for decreased urine volume, difficulty urinating, dysuria and flank pain.   Musculoskeletal: Negative for back pain, neck pain and neck stiffness.   Skin: Positive for wound. Negative for rash.   Neurological: Positive for numbness. Negative for weakness and headaches.   Psychiatric/Behavioral: Negative for confusion. The patient is not nervous/anxious.      Objective:     Vital Signs (Most Recent):  Temp: 98.3 °F (36.8 °C) (09/02/20 0815)  Pulse: 81 (09/02/20 0400)  Resp: 19 (09/02/20 0400)  BP: 115/63 (09/02/20 1140)  SpO2: 97 % (09/02/20 0400) Vital Signs (24h Range):  Temp:  [98.1 °F (36.7 °C)-98.6 °F (37 °C)] 98.3 °F (36.8 °C)  Pulse:  [80-97] 81  Resp:  [18-19] 19  SpO2:  [97 %-98 %] 97 %  BP: (115-129)/(63-77) 115/63     Weight: 120.2 kg (265 lb)  Body mass index is 34.01 kg/m².    Foot Exam    Right Foot/Ankle     Inspection and Palpation  Tenderness: none   Swelling: (Diffuse edema to right foot )  Skin Exam: drainage, ulcer and erythema;     Neurovascular  Dorsalis pedis: absent  Posterior tibial: absent  Saphenous nerve sensation: diminished  Tibial nerve  sensation: diminished  Superficial peroneal nerve sensation: diminished  Deep peroneal nerve sensation: diminished  Sural nerve sensation: diminished      Left Foot/Ankle      Inspection and Palpation  Tenderness: none   Swelling: none   Skin Exam: skin intact;     Neurovascular  Dorsalis pedis: absent  Posterior tibial: absent  Saphenous nerve sensation: diminished  Tibial nerve sensation: diminished  Superficial peroneal nerve sensation: diminished  Deep peroneal nerve sensation: diminished  Sural nerve sensation: diminished            Laboratory:  CBC:   Recent Labs   Lab 09/02/20  0234   WBC 7.74   RBC 4.29*   HGB 12.3*   HCT 36.9*      MCV 86   MCH 28.7   MCHC 33.3     CRP:   Recent Labs   Lab 09/01/20  1546   .9*     ESR:   Recent Labs   Lab 09/01/20  1546   SEDRATE 47*     Wound Cultures:   Recent Labs   Lab 07/03/20  1540 07/04/20  1212 07/14/20  0830 07/28/20  1617 09/01/20  1731   LABAERO No significant isolate No growth  No growth No growth STREPTOCOCCUS AGALACTIAE (GROUP B)  Rare  Beta-hemolytic streptococci are routinely susceptible to   penicillins,cephalosporins and carbapenems.  * KLEBSIELLA PNEUMONIAE  Rare  Susceptibility pending  *  STREPTOCOCCUS AGALACTIAE (GROUP B)  Few  Beta-hemolytic streptococci are routinely susceptible to   penicillins,cephalosporins and carbapenems.  Susceptibility testing not routinely performed  *     Microbiology Results (last 7 days)     Procedure Component Value Units Date/Time    Aerobic culture (Specify Source) **CANNOT BE ORDERED AS STAT* [[id number]]  (Abnormal) Collected: 09/01/20 1731    Order Status: Completed Specimen: Incision site from Toe, Left Foot Updated: 09/02/20 1351     Aerobic Bacterial Culture KLEBSIELLA PNEUMONIAE  Rare  Susceptibility pending        STREPTOCOCCUS AGALACTIAE (GROUP B)  Few  Beta-hemolytic streptococci are routinely susceptible to   penicillins,cephalosporins and carbapenems.  Susceptibility testing not routinely  performed      Blood culture [[id number]] Collected: 09/01/20 2306    Order Status: Completed Specimen: Blood Updated: 09/02/20 0945     Blood Culture, Routine No Growth to date    Blood culture [[id number]] Collected: 09/01/20 2211    Order Status: Completed Specimen: Blood from Peripheral, Antecubital, Right Updated: 09/02/20 0515     Blood Culture, Routine No Growth to date        Specimen (12h ago, onward)    None          Diagnostic Results:  I have reviewed all pertinent imaging results/findings within the past 24 hours.    Clinical Findings:  Ulcer location: 1.2x 1.5x 0.4cm  left, hallux amp site. Serous drainage. Probes to bone. New blow out lesion to medial 1st metatarsal head. Probes to bone.     Assessment/Plan:     Acute osteomyelitis of left foot  54 yo M with s/p L hallux amputation 2/2 osteomyelitis 7/2020 now with osteo of the 1st metatarsal shaft with 2 sinus tracts extending towards the 1st metatarsal head on MRI.  On initial presentation in the ED, WBC 11.46 with left shift, ESR 47 .     Plan:  -Will plan for 1st metatarsal resection during this hospital admission once adequate perfusion to heal the wound is confirmed.   -Continue IV abx. ID on board.  -Previous arterial studies 7/2020 with triphasic waveforms throughout except monophasic waveforms in the PT. Vascular on board. Appreciate assistance.  -Continue local wound care in the interim  -Rest of care per primary  -Podiatry will continue to follow.           Thank you for your consult. I will follow-up with patient. Please contact us if you have any additional questions.      Phoebe Murphy DPM  [facility]- ICU 14 WT  Podiatry PGY2  Pager: [phone]  Spectra:[phone]

## 2020-09-02 NOTE — SUBJECTIVE & OBJECTIVE
Scheduled Meds:   aspirin  81 mg Oral Daily    atorvastatin  80 mg Oral Daily    carvediloL  12.5 mg Oral BID    cefTRIAXone (ROCEPHIN) IVPB  2 g Intravenous Q24H    enoxaparin  40 mg Subcutaneous Q24H    insulin aspart U-100  5 Units Subcutaneous TIDWM    insulin detemir U-100  40 Units Subcutaneous QHS    lisinopriL  40 mg Oral Daily     Continuous Infusions:   sodium chloride 0.9% 125 mL/hr at 09/02/20 0536     PRN Meds:acetaminophen, dextrose 50%, dextrose 50%, glucagon (human recombinant), glucose, glucose, insulin aspart U-100, melatonin, ondansetron, promethazine (PHENERGAN) IVPB, sodium chloride 0.9%    Review of patient's allergies indicates:   Allergen Reactions    Penicillins Other (See Comments)     PCN allergy as a child - was told he went into a coma. Tolerates Cefazolin without adverse reactions    Shellfish containing products      Other reaction(s): Unknown    Vancomycin Itching     Tolerated vancomycin 7/2020    Bactrim  [sulfamethoxazole-trimethoprim] Rash        Past Medical History:   Diagnosis Date    Allergy     CAD (coronary artery disease), native coronary artery 6/25/2013    Cancer     Diabetes mellitus     Diabetes mellitus, type 2     Disorder of kidney and ureter     Heart attack 04/2012    Hx of colon cancer, stage I     Hyperlipidemia     Hypertension     Muscular pain     post-op after colonoscopy    MICHAELA (obstructive sleep apnea)     Retinopathy due to secondary diabetes      Past Surgical History:   Procedure Laterality Date    COLONOSCOPY N/A 2/17/2017    Procedure: COLONOSCOPY;  Surgeon: Simon Gomez MD;  Location: [facility] (4TH FLR);  Service: Endoscopy;  Laterality: N/A;    CORONARY ANGIOPLASTY      INCISION AND DRAINAGE FOOT Right 6/5/2018    Procedure: INCISION AND DRAINAGE, FOOT;  Surgeon: Bridget Santana DPM;  Location: [address];  Service: Podiatry;  Laterality: Right;  Request mini C arm in room. request wound vac with medium black  sponge    INCISION AND DRAINAGE FOOT Right 6/7/2018    Procedure: INCISION AND DRAINAGE, FOOT;  Surgeon: Emelia Brock DPM;  [facility];  Service: Podiatry;  Laterality: Right;    INCISION AND DRAINAGE OF ABSCESS Right 6/2/2018    Procedure: INCISION AND DRAINAGE, ABSCESS;  Surgeon: Bridget Santana DPM;  [facility];  Service: General;  Laterality: Right;    REMOVAL OF FOREIGN BODY FROM FOOT Right 6/7/2018    Procedure: REMOVAL, FOREIGN BODY, FOOT;  Surgeon: Emelia Brock DPM;  [facility];  Service: Podiatry;  Laterality: Right;    TOE AMPUTATION Left 7/4/2020    Procedure: AMPUTATION, TOE;  Surgeon: Bridget Santana DPM;  Location: [facility] OR [address] [place] [address];  Service: Podiatry;  Laterality: Left;    TONSILLECTOMY         Family History     Problem Relation (Age of Onset)    Heart disease Mother, Brother    No Known Problems Father        Tobacco Use    Smoking status: Never Smoker    Smokeless tobacco: Never Used   Substance and Sexual Activity    Alcohol use: Yes     Comment: rare x1 beer-     Drug use: No    Sexual activity: Not Currently     Review of Systems   Constitutional: Negative for chills and fever.   HENT: Negative for congestion, ear pain, rhinorrhea and sneezing.    Eyes: Negative for pain.   Respiratory: Positive for cough. Negative for shortness of breath.    Cardiovascular: Positive for leg swelling. Negative for chest pain.   Gastrointestinal: Positive for vomiting (chronic). Negative for abdominal pain, constipation, diarrhea and nausea.   Endocrine: Negative for polyuria.   Genitourinary: Negative for decreased urine volume, difficulty urinating, dysuria and flank pain.   Musculoskeletal: Negative for back pain, neck pain and neck stiffness.   Skin: Positive for wound. Negative for rash.   Neurological: Positive for numbness. Negative for weakness and headaches.   Psychiatric/Behavioral: Negative for confusion. The patient is not nervous/anxious.       Objective:     Vital Signs (Most Recent):  Temp: 98.3 °F (36.8 °C) (09/02/20 0815)  Pulse: 81 (09/02/20 0400)  Resp: 19 (09/02/20 0400)  BP: 115/63 (09/02/20 1140)  SpO2: 97 % (09/02/20 0400) Vital Signs (24h Range):  Temp:  [98.1 °F (36.7 °C)-98.6 °F (37 °C)] 98.3 °F (36.8 °C)  Pulse:  [80-97] 81  Resp:  [18-19] 19  SpO2:  [97 %-98 %] 97 %  BP: (115-129)/(63-77) 115/63     Weight: 120.2 kg (265 lb)  Body mass index is 34.01 kg/m².    Foot Exam    Right Foot/Ankle     Inspection and Palpation  Tenderness: none   Swelling: (Diffuse edema to right foot )  Skin Exam: drainage, ulcer and erythema;     Neurovascular  Dorsalis pedis: absent  Posterior tibial: absent  Saphenous nerve sensation: diminished  Tibial nerve sensation: diminished  Superficial peroneal nerve sensation: diminished  Deep peroneal nerve sensation: diminished  Sural nerve sensation: diminished      Left Foot/Ankle      Inspection and Palpation  Tenderness: none   Swelling: none   Skin Exam: skin intact;     Neurovascular  Dorsalis pedis: absent  Posterior tibial: absent  Saphenous nerve sensation: diminished  Tibial nerve sensation: diminished  Superficial peroneal nerve sensation: diminished  Deep peroneal nerve sensation: diminished  Sural nerve sensation: diminished            Laboratory:  CBC:   Recent Labs   Lab 09/02/20  0234   WBC 7.74   RBC 4.29*   HGB 12.3*   HCT 36.9*      MCV 86   MCH 28.7   MCHC 33.3     CRP:   Recent Labs   Lab 09/01/20  1546   .9*     ESR:   Recent Labs   Lab 09/01/20  1546   SEDRATE 47*     Wound Cultures:   Recent Labs   Lab 07/03/20  1540 07/04/20  1212 07/14/20  0830 07/28/20  1617 09/01/20  1731   LABAERO No significant isolate No growth  No growth No growth STREPTOCOCCUS AGALACTIAE (GROUP B)  Rare  Beta-hemolytic streptococci are routinely susceptible to   penicillins,cephalosporins and carbapenems.  * KLEBSIELLA PNEUMONIAE  Rare  Susceptibility pending  *  STREPTOCOCCUS AGALACTIAE (GROUP  B)  Few  Beta-hemolytic streptococci are routinely susceptible to   penicillins,cephalosporins and carbapenems.  Susceptibility testing not routinely performed  *     Microbiology Results (last 7 days)     Procedure Component Value Units Date/Time    Aerobic culture (Specify Source) **CANNOT BE ORDERED AS STAT* [[id number]]  (Abnormal) Collected: 09/01/20 1731    Order Status: Completed Specimen: Incision site from Toe, Left Foot Updated: 09/02/20 1351     Aerobic Bacterial Culture KLEBSIELLA PNEUMONIAE  Rare  Susceptibility pending        STREPTOCOCCUS AGALACTIAE (GROUP B)  Few  Beta-hemolytic streptococci are routinely susceptible to   penicillins,cephalosporins and carbapenems.  Susceptibility testing not routinely performed      Blood culture [[id number]] Collected: 09/01/20 2306    Order Status: Completed Specimen: Blood Updated: 09/02/20 0945     Blood Culture, Routine No Growth to date    Blood culture [[id number]] Collected: 09/01/20 2211    Order Status: Completed Specimen: Blood from Peripheral, Antecubital, Right Updated: 09/02/20 0515     Blood Culture, Routine No Growth to date        Specimen (12h ago, onward)    None          Diagnostic Results:  I have reviewed all pertinent imaging results/findings within the past 24 hours.    Clinical Findings:  Ulcer location: 1.2x 1.5x 0.4cm  left, hallux amp site. Serous drainage. Probes to bone. New blow out lesion to medial 1st metatarsal head. Probes to bone.

## 2020-09-02 NOTE — PLAN OF CARE
Pt arrived on unit at 2215, was able to ambulate to bed, had 2 sandwiches, a pudding, and some water, refused sugar check and long acting insuline coverage, pt allergic to vanco, notified provider about allergy, slept the rest of the night, call light in reach, bed locked and low.

## 2020-09-02 NOTE — HPI
54 yo M with PMHx Type 2 DN, osteomyelitis s/p L hallux amputation 7/2020, PVD, CAD, HLD, and HTN who was referred to the ED from podiatry clinic for worsening purulence and erythema at site of previous amputation. The patient underwent amputation of L hallux 7/2020 with delayed healing since the surgery despite aggressive weekly wound care. Clean margins at the time of surgery had no growth. Repeat cx 7/28 +GBS. Repeat cx yesterday +GBS and Klebsiella. Patient denies any pain. He has neuropathy in bilateral lower extremities at baseline. Denies N/V/F/C. No complaints.     On initial presentation in the ED, WBC 11.46 with left shift, ESR 47 . MRI left forefoot + osteo of the 1st metatarsal bone with 2 sinus tracts extending toward the 1st metatarsal head. Also tested +COVID.

## 2020-09-02 NOTE — CONSULTS
[facility] - ICU 14 WT  Vascular Surgery  Consult Note    Inpatient consult to Vascular Surgery  Consult performed by: Pasha Youssef MD  Consult ordered by: Zachary Ansari MD        Subjective:     Chief Complaint/Reason for Admission: Diabetic foot infection    History of Present Illness: The patient is a very pleasant 54 yo M with PMHx DM type 2, osteomyelitis of L 1st toe s/p amputation, PVD, CAD, HLD, and HTN who presents from podiatry clinic for worsening purulence and erythema at site of previous amputation. The patient underwent amputation of L 1st toe 7/4. Since then, he has been on multiple courses of abx, and he has had delayed wound healing. He was referred to the ED for further management of non-healing amputation site. Pt. Denies pain as he has veery little feeling in his feet. He denies any fevers, chills, nausea, vomiting, diarrhea or malaise. He reports feeling his normal self. He tested positive for COVID-19 during routine screening while in the ED. He is a life long non-smoker.  His glucose seems poorly controlled. He is ambulatory on the foot.  He blames it all on poorly fitting boots.  He says he does not have PVD.  Previously he had cellulitis of the RLE. He has an ultrasound with VASILIY from 2018.  The VASILIY on the R could not be obtained due to overlying cellulitis, but overall it was read as no hemodynamically significant stenosis in either leg.    Medications Prior to Admission   Medication Sig Dispense Refill Last Dose    atorvastatin (LIPITOR) 80 MG tablet Take 1 tablet (80 mg total) by mouth once daily. 30 tablet 6 9/1/2020    BIOTIN ORAL Take 1 capsule by mouth once daily.   9/1/2020    blood sugar diagnostic Strp Strips and lancets    Use before meals and bedtime 150 strip 12 9/1/2020    carvediloL (COREG) 12.5 MG tablet Take 1 tablet (12.5 mg total) by mouth 2 (two) times daily. 60 tablet 11 9/1/2020    insulin glargine (LANTUS) 100 unit/mL injection Inject 55 Units  "into the skin every evening. 20 mL 11 9/1/2020    insulin regular 100 unit/mL Inj injection Inject 20 Units into the skin 3 (three) times daily before meals.  12 9/1/2020    lisinopriL (PRINIVIL,ZESTRIL) 40 MG tablet Take 1 tablet (40 mg total) by mouth once daily. 30 tablet 6 9/1/2020    metFORMIN (GLUCOPHAGE) 1000 MG tablet Take 1 tablet (1,000 mg total) by mouth 2 (two) times daily with meals. 60 tablet 6 9/1/2020    pen needle, diabetic 31 gauge x 3/16" Ndle Inject 1 each into the skin 3 (three) times daily before meals. (Patient taking differently: Inject 1 each into the skin 4 (four) times daily. ) 100 each 12 9/1/2020    aspirin (ECOTRIN) 81 MG EC tablet Take 1 tablet (81 mg total) by mouth once daily.  0        Review of patient's allergies indicates:   Allergen Reactions    Penicillins Other (See Comments)     PCN allergy as a child - was told he went into a coma. Tolerates Cefazolin without adverse reactions    Shellfish containing products      Other reaction(s): Unknown    Vancomycin Itching     Tolerated vancomycin 7/2020    Bactrim  [sulfamethoxazole-trimethoprim] Rash       Past Medical History:   Diagnosis Date    Allergy     CAD (coronary artery disease), native coronary artery 6/25/2013    Cancer     Diabetes mellitus     Diabetes mellitus, type 2     Disorder of kidney and ureter     Heart attack 04/2012    Hx of colon cancer, stage I     Hyperlipidemia     Hypertension     Muscular pain     post-op after colonoscopy    MICHAELA (obstructive sleep apnea)     Retinopathy due to secondary diabetes      Past Surgical History:   Procedure Laterality Date    COLONOSCOPY N/A 2/17/2017    Procedure: COLONOSCOPY;  Surgeon: Simon Gomez MD;  Location: [facility] (4TH FLR);  Service: Endoscopy;  Laterality: N/A;    CORONARY ANGIOPLASTY      INCISION AND DRAINAGE FOOT Right 6/5/2018    Procedure: INCISION AND DRAINAGE, FOOT;  Surgeon: Bridget Santana DPM;  Location: [facility] OR 1ST FLR;  " Service: Podiatry;  Laterality: Right;  Request mini C arm in room. request wound vac with medium black sponge    INCISION AND DRAINAGE FOOT Right 6/7/2018    Procedure: INCISION AND DRAINAGE, FOOT;  Surgeon: Emelia Brock DPM;  [facility];  Service: Podiatry;  Laterality: Right;    INCISION AND DRAINAGE OF ABSCESS Right 6/2/2018    Procedure: INCISION AND DRAINAGE, ABSCESS;  Surgeon: Bridget Santana DPM;  Location: [facility] OR [address] [place] [address];  Service: General;  Laterality: Right;    REMOVAL OF FOREIGN BODY FROM FOOT Right 6/7/2018    Procedure: REMOVAL, FOREIGN BODY, FOOT;  Surgeon: Emelia Brock DPM;  [facility];  Service: Podiatry;  Laterality: Right;    TOE AMPUTATION Left 7/4/2020    Procedure: AMPUTATION, TOE;  Surgeon: Bridget Santana DPM;  Location: [facility] OR [address] [place] [address];  Service: Podiatry;  Laterality: Left;    TONSILLECTOMY       Family History     Problem Relation (Age of Onset)    Heart disease Mother, Brother    No Known Problems Father        Tobacco Use    Smoking status: Never Smoker    Smokeless tobacco: Never Used   Substance and Sexual Activity    Alcohol use: Yes     Comment: rare x1 beer-     Drug use: No    Sexual activity: Not Currently     Review of Systems   Constitutional: Negative for activity change, chills, diaphoresis and fever.   HENT: Negative for congestion and ear pain.    Respiratory: Positive for cough. Negative for chest tightness, shortness of breath, wheezing and stridor.    Cardiovascular: Positive for leg swelling (Prior cellulitis with swelling in the past). Negative for chest pain.   Gastrointestinal: Negative for abdominal distention and abdominal pain.   Musculoskeletal: Negative for arthralgias, gait problem and neck stiffness.   Neurological: Negative for dizziness and numbness.     Objective:     Vital Signs (Most Recent):  Temp: 98.3 °F (36.8 °C) (09/02/20 0815)  Pulse: 81 (09/02/20 0400)  Resp: 19 (09/02/20 0400)  BP: 115/63 (09/02/20  1140)  SpO2: 97 % (09/02/20 0400) Vital Signs (24h Range):  Temp:  [98.1 °F (36.7 °C)-98.6 °F (37 °C)] 98.3 °F (36.8 °C)  Pulse:  [80-97] 81  Resp:  [18-19] 19  SpO2:  [97 %-98 %] 97 %  BP: (115-129)/(63-77) 115/63     Weight: 120.2 kg (265 lb)  Body mass index is 34.01 kg/m².    Physical Exam  HENT:      Head: Normocephalic.      Mouth/Throat:      Mouth: Mucous membranes are moist.   Eyes:      Extraocular Movements: Extraocular movements intact.      Pupils: Pupils are equal, round, and reactive to light.   Cardiovascular:      Rate and Rhythm: Normal rate and regular rhythm.      Comments: Bilateral non-palpable pulses, DP/PT are bilaterally triphasic, L DP is a bit diminished.  Pulmonary:      Effort: Pulmonary effort is normal. No respiratory distress.      Breath sounds: No wheezing or rhonchi.   Abdominal:      General: Abdomen is flat.      Palpations: Abdomen is soft.   Skin:     General: Skin is warm.      Capillary Refill: Capillary refill takes less than 2 seconds.   Neurological:      General: No focal deficit present.      Mental Status: He is alert and oriented to person, place, and time.   MSK:     1+ pitting edema        Significant Labs:  BMP:   Recent Labs   Lab 09/02/20  0234   *   *   K 4.5      CO2 22*   BUN 20   CREATININE 0.8   CALCIUM 8.3*     CBC:   Recent Labs   Lab 09/02/20  0234   WBC 7.74   RBC 4.29*   HGB 12.3*   HCT 36.9*      MCV 86   MCH 28.7   MCHC 33.3       Significant Diagnostics:  Previous Art US and VASILIY     EXAMINATION:  US ARTERIAL LOWER EXTREMITY BILAT WITH VASILIY (XPD)     CLINICAL HISTORY:  eval for PAD  ulceration right foot.;     TECHNIQUE:  Bilateral lower extremity arterial duplex ultrasound examination performed. Multiple gray scale and color doppler images were obtained in addition to waveform analysis.  Ankle-brachial indices were calculated.     COMPARISON:  None     FINDINGS:  The ankle brachial index on the left is 1.14.  Right VASILIY not  obtained secondary to patient's lower extremity cellulitis/wound.     The peak systolic velocities on the right are as follows, in centimeters/second:     Common femoral artery: 91     Superficial femoral artery, proximal: 97     Superficial femoral artery, mid portion: 113     Superficial femoral artery, distal: 95     Popliteal artery: 96     Posterior tibial artery: 98     Anterior tibial artery: 94     The peak systolic velocities on the left are as follows, in centimeters/second:     Common femoral artery: 120     Superficial femoral artery, proximal: 84     Superficial femoral artery, mid portion: 92     Superficial femoral artery, distal: 85     Popliteal artery: 67     Posterior tibial artery: 92     Anterior tibial artery: 63     Normal arterial waveforms are demonstrated.     IMPRESSION:      Ankle-brachial index of 1.14 on the left.  VASILIY on the right not obtained secondary to patient's lower extremity cellulitis/wound.     No hemodynamically significant stenosis demonstrated in the right or left lower extremity arterial system.    Assessment/Plan:     * Diabetic foot infection  55 year old male with a diabetic infection of previous right 1st ray amp with evidence of continued osteomyelitis on MRI    -Will obtain ABIs with toe pressures to screen for vascular disease.  His pulses are non-palpable but he is a bit edematous and his doppler signals are triphasic.  -Further recommendations pending non-invasives.  Rest of care per primary          Pasha Youssef MD  Vascular Surgery  [facility] - ICU 14 WT

## 2020-09-03 LAB
ALBUMIN SERPL BCP-MCNC: 2.2 G/DL (ref 3.5–5.2)
ALP SERPL-CCNC: 82 U/L (ref 55–135)
ALT SERPL W/O P-5'-P-CCNC: 13 U/L (ref 10–44)
ANION GAP SERPL CALC-SCNC: 6 MMOL/L (ref 8–16)
AST SERPL-CCNC: 12 U/L (ref 10–40)
BACTERIA SPEC AEROBE CULT: ABNORMAL
BACTERIA SPEC AEROBE CULT: ABNORMAL
BASOPHILS # BLD AUTO: 0.03 K/UL (ref 0–0.2)
BASOPHILS NFR BLD: 0.4 % (ref 0–1.9)
BILIRUB SERPL-MCNC: 0.3 MG/DL (ref 0.1–1)
BUN SERPL-MCNC: 16 MG/DL (ref 6–20)
CALCIUM SERPL-MCNC: 7.9 MG/DL (ref 8.7–10.5)
CHLORIDE SERPL-SCNC: 104 MMOL/L (ref 95–110)
CO2 SERPL-SCNC: 23 MMOL/L (ref 23–29)
CREAT SERPL-MCNC: 0.9 MG/DL (ref 0.5–1.4)
DIFFERENTIAL METHOD: ABNORMAL
EOSINOPHIL # BLD AUTO: 0.2 K/UL (ref 0–0.5)
EOSINOPHIL NFR BLD: 2.3 % (ref 0–8)
ERYTHROCYTE [DISTWIDTH] IN BLOOD BY AUTOMATED COUNT: 13.1 % (ref 11.5–14.5)
EST. GFR  (AFRICAN AMERICAN): >60 ML/MIN/1.73 M^2
EST. GFR  (NON AFRICAN AMERICAN): >60 ML/MIN/1.73 M^2
GLUCOSE SERPL-MCNC: 317 MG/DL (ref 70–110)
HCT VFR BLD AUTO: 35.3 % (ref 40–54)
HGB BLD-MCNC: 11.9 G/DL (ref 14–18)
IMM GRANULOCYTES # BLD AUTO: 0.03 K/UL (ref 0–0.04)
IMM GRANULOCYTES NFR BLD AUTO: 0.4 % (ref 0–0.5)
LYMPHOCYTES # BLD AUTO: 1.3 K/UL (ref 1–4.8)
LYMPHOCYTES NFR BLD: 18.4 % (ref 18–48)
MCH RBC QN AUTO: 29.3 PG (ref 27–31)
MCHC RBC AUTO-ENTMCNC: 33.7 G/DL (ref 32–36)
MCV RBC AUTO: 87 FL (ref 82–98)
MONOCYTES # BLD AUTO: 0.9 K/UL (ref 0.3–1)
MONOCYTES NFR BLD: 12.6 % (ref 4–15)
NEUTROPHILS # BLD AUTO: 4.5 K/UL (ref 1.8–7.7)
NEUTROPHILS NFR BLD: 65.9 % (ref 38–73)
NRBC BLD-RTO: 0 /100 WBC
PLATELET # BLD AUTO: 255 K/UL (ref 150–350)
PMV BLD AUTO: 9.2 FL (ref 9.2–12.9)
POCT GLUCOSE: 177 MG/DL (ref 70–110)
POCT GLUCOSE: 229 MG/DL (ref 70–110)
POCT GLUCOSE: 237 MG/DL (ref 70–110)
POCT GLUCOSE: 266 MG/DL (ref 70–110)
POTASSIUM SERPL-SCNC: 4.5 MMOL/L (ref 3.5–5.1)
PROT SERPL-MCNC: 5.9 G/DL (ref 6–8.4)
RBC # BLD AUTO: 4.06 M/UL (ref 4.6–6.2)
SODIUM SERPL-SCNC: 133 MMOL/L (ref 136–145)
VANCOMYCIN TROUGH SERPL-MCNC: <1.1 UG/ML (ref 10–22)
WBC # BLD AUTO: 6.84 K/UL (ref 3.9–12.7)

## 2020-09-03 PROCEDURE — 93922 UPR/L XTREMITY ART 2 LEVELS: CPT | Performed by: SURGERY

## 2020-09-03 PROCEDURE — 99233 SBSQ HOSP IP/OBS HIGH 50: CPT | Mod: ,,, | Performed by: INTERNAL MEDICINE

## 2020-09-03 PROCEDURE — 94761 N-INVAS EAR/PLS OXIMETRY MLT: CPT

## 2020-09-03 PROCEDURE — 85025 COMPLETE CBC W/AUTO DIFF WBC: CPT

## 2020-09-03 PROCEDURE — 99232 PR SUBSEQUENT HOSPITAL CARE,LEVL II: ICD-10-PCS | Mod: ,,, | Performed by: HOSPITALIST

## 2020-09-03 PROCEDURE — 99233 PR SUBSEQUENT HOSPITAL CARE,LEVL III: ICD-10-PCS | Mod: ,,, | Performed by: INTERNAL MEDICINE

## 2020-09-03 PROCEDURE — 63600175 PHARM REV CODE 636 W HCPCS: Performed by: HOSPITALIST

## 2020-09-03 PROCEDURE — C9399 UNCLASSIFIED DRUGS OR BIOLOG: HCPCS | Performed by: HOSPITALIST

## 2020-09-03 PROCEDURE — 63600175 PHARM REV CODE 636 W HCPCS: Performed by: INTERNAL MEDICINE

## 2020-09-03 PROCEDURE — 11000001 HC ACUTE MED/SURG PRIVATE ROOM

## 2020-09-03 PROCEDURE — 80053 COMPREHEN METABOLIC PANEL: CPT

## 2020-09-03 PROCEDURE — 99232 SBSQ HOSP IP/OBS MODERATE 35: CPT | Mod: ,,, | Performed by: HOSPITALIST

## 2020-09-03 PROCEDURE — 25000003 PHARM REV CODE 250: Performed by: HOSPITALIST

## 2020-09-03 PROCEDURE — 36415 COLL VENOUS BLD VENIPUNCTURE: CPT

## 2020-09-03 PROCEDURE — 80202 ASSAY OF VANCOMYCIN: CPT

## 2020-09-03 RX ADMIN — CARVEDILOL 12.5 MG: 12.5 TABLET, FILM COATED ORAL at 08:09

## 2020-09-03 RX ADMIN — ASPIRIN 81 MG: 81 TABLET, COATED ORAL at 08:09

## 2020-09-03 RX ADMIN — INSULIN ASPART 2 UNITS: 100 INJECTION, SOLUTION INTRAVENOUS; SUBCUTANEOUS at 08:09

## 2020-09-03 RX ADMIN — INSULIN ASPART 5 UNITS: 100 INJECTION, SOLUTION INTRAVENOUS; SUBCUTANEOUS at 07:09

## 2020-09-03 RX ADMIN — INSULIN DETEMIR 40 UNITS: 100 INJECTION, SOLUTION SUBCUTANEOUS at 08:09

## 2020-09-03 RX ADMIN — INSULIN ASPART 2 UNITS: 100 INJECTION, SOLUTION INTRAVENOUS; SUBCUTANEOUS at 11:09

## 2020-09-03 RX ADMIN — ENOXAPARIN SODIUM 40 MG: 40 INJECTION SUBCUTANEOUS at 04:09

## 2020-09-03 RX ADMIN — LISINOPRIL 40 MG: 20 TABLET ORAL at 08:09

## 2020-09-03 RX ADMIN — INSULIN ASPART 4 UNITS: 100 INJECTION, SOLUTION INTRAVENOUS; SUBCUTANEOUS at 04:09

## 2020-09-03 RX ADMIN — ATORVASTATIN CALCIUM 80 MG: 20 TABLET, FILM COATED ORAL at 08:09

## 2020-09-03 RX ADMIN — INSULIN ASPART 5 UNITS: 100 INJECTION, SOLUTION INTRAVENOUS; SUBCUTANEOUS at 04:09

## 2020-09-03 RX ADMIN — INSULIN ASPART 5 UNITS: 100 INJECTION, SOLUTION INTRAVENOUS; SUBCUTANEOUS at 11:09

## 2020-09-03 RX ADMIN — MELATONIN TAB 3 MG 6 MG: 3 TAB at 08:09

## 2020-09-03 RX ADMIN — CEFTRIAXONE SODIUM 2 G: 2 INJECTION, SOLUTION INTRAVENOUS at 05:09

## 2020-09-03 RX ADMIN — INSULIN ASPART 6 UNITS: 100 INJECTION, SOLUTION INTRAVENOUS; SUBCUTANEOUS at 07:09

## 2020-09-03 NOTE — PROGRESS NOTES
[facility] - ICU 14 WT  Infectious Disease  Progress Note    Patient Name: Billy Sheffield Miguel  MRN: [id number]  Admission Date: 9/1/2020  Length of Stay: 2 days  Attending Physician: Rodrigo Hendrix MD  Primary Care Provider: BRAD Baker MD    Isolation Status: Airborne and Contact and Droplet  Assessment/Plan:      * Diabetic foot infection  A: Osteomyelitis of left 1st metatarsal per clinical exam and MRI, two overlying wounds probe to bone. Vitals stable. WBCs WNL and continue to decrease. Wound culture growing Klebsiella pneumoniae and Strep agalactiae, blood cultures NGTD.     P:   -Continue ceftriaxone 2g [id number].   -Will followup on noninvasive vascular studies and vascular surgery recommendations.  -Will followup on micro/path results from surgical resection of bone by podiatry. Podiatry awaiting vascular recommendations prior to surgery.   -Will continue to follow.         Thank you for your consult. I will follow-up with patient. Please contact us if you have any additional questions.    Josiah Saunders MD  Infectious Disease  [facility] - ICU 14 WT    Subjective:     Principal Problem:Diabetic foot infection    HPI: 56 yo M with Hx of DM2, osteomyelitis of L 1st digit (s/p amputation 07/04), PVD, CAD (past MI), HTN, HLD sent by podiatry clinic due to suspected infection at amputation site. Patient has had delayed healing of the surgical wound despite multiple courses of PO antibiotics. Cultures from 07/04 grew Prevotella for which he was prescribed doxycycline and metronidazole. The wound ultimately dehisced and repeat cultures from 07/28 grew Strep agalactiae for which he was prescribed clindamycin. Yesterday at followup podiatry appointment worsening purulence and erythema were noted, patient was sent to ED. Patient denies pain at the amputation site, though he admits to diabetic neuropathy. He tested positive for COVID-19 with preadmission screen, only symptom is dry  "cough of 1 month duration.   Interval History: NAEON. Vitals stable, WBCs WNL and continue to decline. Vascular surgery on board, recommendations pending noninvasive studies. Patient still reports feeling subjectively "great". Patient in agreement with podiatry plan to resect infected bone which will be tomorrow at the earliest pending VASILIY results.     Review of Systems   Constitutional: Negative for chills and fever.   HENT: Negative for congestion, ear pain, rhinorrhea and sneezing.    Eyes: Negative for pain.   Respiratory: Positive for cough. Negative for shortness of breath.    Cardiovascular: Positive for leg swelling. Negative for chest pain.   Gastrointestinal: Positive for vomiting (chronic). Negative for abdominal pain, constipation, diarrhea and nausea.   Endocrine: Negative for polyuria.   Genitourinary: Negative for decreased urine volume, difficulty urinating, dysuria and flank pain.   Musculoskeletal: Negative for back pain, neck pain and neck stiffness.   Skin: Positive for wound. Negative for rash.   Neurological: Positive for numbness. Negative for weakness and headaches.   Psychiatric/Behavioral: Negative for confusion. The patient is not nervous/anxious.      Objective:     Vital Signs (Most Recent):  Temp: 98.2 °F (36.8 °C) (09/03/20 0800)  Pulse: 84 (09/03/20 0410)  Resp: 20 (09/03/20 0410)  BP: (!) 122/58 (09/03/20 0800)  SpO2: 98 % (09/03/20 0800) Vital Signs (24h Range):  Temp:  [98.1 °F (36.7 °C)-98.7 °F (37.1 °C)] 98.2 °F (36.8 °C)  Pulse:  [84] 84  Resp:  [20] 20  SpO2:  [97 %-98 %] 98 %  BP: (106-149)/(58-85) 122/58     Weight: 120.2 kg (265 lb)  Body mass index is 34.01 kg/m².    Estimated Creatinine Clearance: 126.3 mL/min (based on SCr of 0.9 mg/dL).    Physical Exam  Constitutional:       Appearance: Normal appearance. He is obese. He is not ill-appearing or toxic-appearing.   HENT:      Head: Normocephalic and atraumatic.      Right Ear: External ear normal.      Left Ear: External " ear normal.      Nose: Nose normal.      Mouth/Throat:      Pharynx: Oropharynx is clear.   Eyes:      General:         Right eye: No discharge.         Left eye: No discharge.      Extraocular Movements: Extraocular movements intact.   Neck:      Musculoskeletal: Normal range of motion. No neck rigidity.   Cardiovascular:      Comments: Bilateral DP and PT pulses diminished  Pulmonary:      Effort: Pulmonary effort is normal. No respiratory distress.   Chest:      Chest wall: No tenderness.   Abdominal:      General: There is no distension.      Palpations: Abdomen is soft.      Tenderness: There is no abdominal tenderness. There is no guarding.   Musculoskeletal:         General: Swelling (amputation site) present. No tenderness.   Skin:     General: Skin is warm and dry.      Findings: Erythema (amputation site) present.      Comments: 2 wounds near L 1st ray amputation site. Distal wound 1.2x0.7x2.2 cm, probes to bone, purulent drainage, erythematous periwound, periwound macerated, not tender. Medial wound 1x0.7x1 cm, probes to deep soft tissues but not bone, purulent and sanguinous drainage, erythematous periwound, periwound macerated, not tender.    Neurological:      Mental Status: He is alert and oriented to person, place, and time.      Sensory: Sensory deficit (bilateral loss of protective sensation in feet) present.      Gait: Gait abnormal.   Psychiatric:         Mood and Affect: Mood normal.         Behavior: Behavior normal.         Thought Content: Thought content normal.         Judgment: Judgment normal.         Significant Labs:   Blood Culture:   Recent Labs   Lab 07/03/20  1450 07/03/20  1541 07/03/20  2020 09/01/20  2211 09/01/20  2306   LABBLOO No growth after 5 days. No growth after 5 days. No growth after 5 days.  No growth after 5 days. No Growth to date  No Growth to date No Growth to date  No Growth to date     CBC:   Recent Labs   Lab 09/01/20  1546 09/02/20  0234 09/03/20  0212   WBC  11.46 7.74 6.84   HGB 15.2 12.3* 11.9*   HCT 45.7 36.9* 35.3*   * 285 255     CMP:   Recent Labs   Lab 09/01/20  1546 09/02/20  0234 09/03/20  0212   * 133* 133*   K 4.7 4.5 4.5   CL 94* 101 104   CO2 23 22* 23   * 329* 317*   BUN 21* 20 16   CREATININE 1.1 0.8 0.9   CALCIUM 9.5 8.3* 7.9*   PROT  --  6.2 5.9*   ALBUMIN  --  2.4* 2.2*   BILITOT  --  0.5 0.3   ALKPHOS  --  84 82   AST  --  9* 12   ALT  --  9* 13   ANIONGAP 11 10 6*   EGFRNONAA >60.0 >60.0 >60.0     Lactic Acid: No results for input(s): LACTATE in the last 48 hours.    POCT Glucose:   Recent Labs   Lab 09/02/20  1616 09/02/20 2005 09/03/20  0745   POCTGLUCOSE 272* 347* 237*     Urine Culture: No results for input(s): LABURIN in the last 4320 hours.  Wound Culture:   Recent Labs   Lab 07/03/20  1540 07/04/20  1212 07/14/20  0830 07/28/20  1617 09/01/20  1731   LABAERO No significant isolate No growth  No growth No growth STREPTOCOCCUS AGALACTIAE (GROUP B)  Rare  Beta-hemolytic streptococci are routinely susceptible to   penicillins,cephalosporins and carbapenems.  * KLEBSIELLA PNEUMONIAE  Rare  Susceptibility pending  *  STREPTOCOCCUS AGALACTIAE (GROUP B)  Few  Beta-hemolytic streptococci are routinely susceptible to   penicillins,cephalosporins and carbapenems.  Susceptibility testing not routinely performed  *       Significant Imaging: None

## 2020-09-03 NOTE — PLAN OF CARE
DX: Diabetic foot infection    Shift Events: No changes during shift. Pt npo past midnight      Plan of Care: Surgery in am     Neuro:  AAO x 4     Respiratory:  Room air     Cardiac: WDL     Diet:  Diabetic Diet     Gtts: Abx therapy     Skin: Left hallux wound. Skin otherwise intact

## 2020-09-03 NOTE — PROGRESS NOTES
Seen on afternoon rounds.  Patient doing well, no complaints.  On physical exam, palpable DP pulse, 1+, strongly biphasic PT signal.  Safe to proceed with podiatry procedure at any time.  Doubt angiogram will be of much benefit to patient.  Recommend best medical therapy for PAD:  - Secondary prevention of atherosclerotic risk factors: Goal BP <140/90, goal LDL <100, goal HbA1C <7.0  - ASA 81mg daily  -High intensity statin (atorvastatin 40mg or rosuvastatin 20mg)    Will follow peripherally.    Charlie Arevalo MD PGY-7  Vascular and Endovascular Surgery Fellow  09/03/2020

## 2020-09-03 NOTE — SUBJECTIVE & OBJECTIVE
"Interval History: NAEON. Vitals stable, WBCs WNL and continue to decline. Vascular surgery on board, recommendations pending noninvasive studies. Patient still reports feeling subjectively "great". Patient in agreement with podiatry plan to resect infected bone which will be tomorrow at the earliest pending VASILIY results.     Review of Systems   Constitutional: Negative for chills and fever.   HENT: Negative for congestion, ear pain, rhinorrhea and sneezing.    Eyes: Negative for pain.   Respiratory: Positive for cough. Negative for shortness of breath.    Cardiovascular: Positive for leg swelling. Negative for chest pain.   Gastrointestinal: Positive for vomiting (chronic). Negative for abdominal pain, constipation, diarrhea and nausea.   Endocrine: Negative for polyuria.   Genitourinary: Negative for decreased urine volume, difficulty urinating, dysuria and flank pain.   Musculoskeletal: Negative for back pain, neck pain and neck stiffness.   Skin: Positive for wound. Negative for rash.   Neurological: Positive for numbness. Negative for weakness and headaches.   Psychiatric/Behavioral: Negative for confusion. The patient is not nervous/anxious.      Objective:     Vital Signs (Most Recent):  Temp: 98.2 °F (36.8 °C) (09/03/20 0800)  Pulse: 84 (09/03/20 0410)  Resp: 20 (09/03/20 0410)  BP: (!) 122/58 (09/03/20 0800)  SpO2: 98 % (09/03/20 0800) Vital Signs (24h Range):  Temp:  [98.1 °F (36.7 °C)-98.7 °F (37.1 °C)] 98.2 °F (36.8 °C)  Pulse:  [84] 84  Resp:  [20] 20  SpO2:  [97 %-98 %] 98 %  BP: (106-149)/(58-85) 122/58     Weight: 120.2 kg (265 lb)  Body mass index is 34.01 kg/m².    Estimated Creatinine Clearance: 126.3 mL/min (based on SCr of 0.9 mg/dL).    Physical Exam  Constitutional:       Appearance: Normal appearance. He is obese. He is not ill-appearing or toxic-appearing.   HENT:      Head: Normocephalic and atraumatic.      Right Ear: External ear normal.      Left Ear: External ear normal.      Nose: Nose " normal.      Mouth/Throat:      Pharynx: Oropharynx is clear.   Eyes:      General:         Right eye: No discharge.         Left eye: No discharge.      Extraocular Movements: Extraocular movements intact.   Neck:      Musculoskeletal: Normal range of motion. No neck rigidity.   Cardiovascular:      Comments: Bilateral DP and PT pulses diminished  Pulmonary:      Effort: Pulmonary effort is normal. No respiratory distress.   Chest:      Chest wall: No tenderness.   Abdominal:      General: There is no distension.      Palpations: Abdomen is soft.      Tenderness: There is no abdominal tenderness. There is no guarding.   Musculoskeletal:         General: Swelling (amputation site) present. No tenderness.   Skin:     General: Skin is warm and dry.      Findings: Erythema (amputation site) present.      Comments: 2 wounds near L 1st ray amputation site. Distal wound 1.2x0.7x2.2 cm, probes to bone, purulent drainage, erythematous periwound, periwound macerated, not tender. Medial wound 1x0.7x1 cm, probes to deep soft tissues but not bone, purulent and sanguinous drainage, erythematous periwound, periwound macerated, not tender.    Neurological:      Mental Status: He is alert and oriented to person, place, and time.      Sensory: Sensory deficit (bilateral loss of protective sensation in feet) present.      Gait: Gait abnormal.   Psychiatric:         Mood and Affect: Mood normal.         Behavior: Behavior normal.         Thought Content: Thought content normal.         Judgment: Judgment normal.         Significant Labs:   Blood Culture:   Recent Labs   Lab 07/03/20  1450 07/03/20  1541 07/03/20  2020 09/01/20  2211 09/01/20  2306   LABBLOO No growth after 5 days. No growth after 5 days. No growth after 5 days.  No growth after 5 days. No Growth to date  No Growth to date No Growth to date  No Growth to date     CBC:   Recent Labs   Lab 09/01/20  1546 09/02/20  0234 09/03/20  0212   WBC 11.46 7.74 6.84   HGB 15.2  12.3* 11.9*   HCT 45.7 36.9* 35.3*   * 285 255     CMP:   Recent Labs   Lab 09/01/20  1546 09/02/20  0234 09/03/20  0212   * 133* 133*   K 4.7 4.5 4.5   CL 94* 101 104   CO2 23 22* 23   * 329* 317*   BUN 21* 20 16   CREATININE 1.1 0.8 0.9   CALCIUM 9.5 8.3* 7.9*   PROT  --  6.2 5.9*   ALBUMIN  --  2.4* 2.2*   BILITOT  --  0.5 0.3   ALKPHOS  --  84 82   AST  --  9* 12   ALT  --  9* 13   ANIONGAP 11 10 6*   EGFRNONAA >60.0 >60.0 >60.0     Lactic Acid: No results for input(s): LACTATE in the last 48 hours.    POCT Glucose:   Recent Labs   Lab 09/02/20  1616 09/02/20 2005 09/03/20  0745   POCTGLUCOSE 272* 347* 237*     Urine Culture: No results for input(s): LABURIN in the last 4320 hours.  Wound Culture:   Recent Labs   Lab 07/03/20  1540 07/04/20  1212 07/14/20  0830 07/28/20  1617 09/01/20  1731   LABAERO No significant isolate No growth  No growth No growth STREPTOCOCCUS AGALACTIAE (GROUP B)  Rare  Beta-hemolytic streptococci are routinely susceptible to   penicillins,cephalosporins and carbapenems.  * KLEBSIELLA PNEUMONIAE  Rare  Susceptibility pending  *  STREPTOCOCCUS AGALACTIAE (GROUP B)  Few  Beta-hemolytic streptococci are routinely susceptible to   penicillins,cephalosporins and carbapenems.  Susceptibility testing not routinely performed  *       Significant Imaging: None

## 2020-09-03 NOTE — ASSESSMENT & PLAN NOTE
A: Osteomyelitis of left 1st metatarsal per clinical exam and MRI, two overlying wounds probe to bone. Vitals stable. WBCs WNL and continue to decrease. Wound culture growing Klebsiella pneumoniae and Strep agalactiae, blood cultures NGTD.     P:   -Continue ceftriaxone 2g [id number].   -Will followup on noninvasive vascular studies and vascular surgery recommendations.  -Will followup on micro/path results from surgical resection of bone by podiatry. Podiatry awaiting vascular recommendations prior to surgery.   -Will continue to follow.

## 2020-09-04 ENCOUNTER — ANESTHESIA (OUTPATIENT)
Dept: SURGERY | Facility: HOSPITAL | Age: 55
DRG: 628 | End: 2020-09-04
Payer: COMMERCIAL

## 2020-09-04 ENCOUNTER — ANESTHESIA EVENT (OUTPATIENT)
Dept: SURGERY | Facility: HOSPITAL | Age: 55
DRG: 628 | End: 2020-09-04
Payer: COMMERCIAL

## 2020-09-04 LAB
ABO + RH BLD: NORMAL
ALBUMIN SERPL BCP-MCNC: 2.6 G/DL (ref 3.5–5.2)
ALP SERPL-CCNC: 97 U/L (ref 55–135)
ALT SERPL W/O P-5'-P-CCNC: 9 U/L (ref 10–44)
ANION GAP SERPL CALC-SCNC: 9 MMOL/L (ref 8–16)
APTT BLDCRRT: 31 SEC (ref 21–32)
AST SERPL-CCNC: 11 U/L (ref 10–40)
BASOPHILS # BLD AUTO: 0.04 K/UL (ref 0–0.2)
BASOPHILS NFR BLD: 0.6 % (ref 0–1.9)
BILIRUB SERPL-MCNC: 0.3 MG/DL (ref 0.1–1)
BLD GP AB SCN CELLS X3 SERPL QL: NORMAL
BUN SERPL-MCNC: 12 MG/DL (ref 6–20)
CALCIUM SERPL-MCNC: 8.3 MG/DL (ref 8.7–10.5)
CHLORIDE SERPL-SCNC: 102 MMOL/L (ref 95–110)
CO2 SERPL-SCNC: 24 MMOL/L (ref 23–29)
CREAT SERPL-MCNC: 0.9 MG/DL (ref 0.5–1.4)
DIFFERENTIAL METHOD: ABNORMAL
EOSINOPHIL # BLD AUTO: 0.2 K/UL (ref 0–0.5)
EOSINOPHIL NFR BLD: 2.3 % (ref 0–8)
ERYTHROCYTE [DISTWIDTH] IN BLOOD BY AUTOMATED COUNT: 13.1 % (ref 11.5–14.5)
EST. GFR  (AFRICAN AMERICAN): >60 ML/MIN/1.73 M^2
EST. GFR  (NON AFRICAN AMERICAN): >60 ML/MIN/1.73 M^2
GLUCOSE SERPL-MCNC: 280 MG/DL (ref 70–110)
GRAM STN SPEC: NORMAL
HCT VFR BLD AUTO: 37.6 % (ref 40–54)
HGB BLD-MCNC: 12.7 G/DL (ref 14–18)
IMM GRANULOCYTES # BLD AUTO: 0.04 K/UL (ref 0–0.04)
IMM GRANULOCYTES NFR BLD AUTO: 0.6 % (ref 0–0.5)
INR PPP: 1 (ref 0.8–1.2)
LYMPHOCYTES # BLD AUTO: 1.4 K/UL (ref 1–4.8)
LYMPHOCYTES NFR BLD: 22.2 % (ref 18–48)
MCH RBC QN AUTO: 29 PG (ref 27–31)
MCHC RBC AUTO-ENTMCNC: 33.8 G/DL (ref 32–36)
MCV RBC AUTO: 86 FL (ref 82–98)
MONOCYTES # BLD AUTO: 0.8 K/UL (ref 0.3–1)
MONOCYTES NFR BLD: 12.1 % (ref 4–15)
NEUTROPHILS # BLD AUTO: 4 K/UL (ref 1.8–7.7)
NEUTROPHILS NFR BLD: 62.2 % (ref 38–73)
NRBC BLD-RTO: 0 /100 WBC
PLATELET # BLD AUTO: 292 K/UL (ref 150–350)
PMV BLD AUTO: 9.4 FL (ref 9.2–12.9)
POCT GLUCOSE: 172 MG/DL (ref 70–110)
POCT GLUCOSE: 202 MG/DL (ref 70–110)
POCT GLUCOSE: 204 MG/DL (ref 70–110)
POCT GLUCOSE: 227 MG/DL (ref 70–110)
POTASSIUM SERPL-SCNC: 4.4 MMOL/L (ref 3.5–5.1)
PROT SERPL-MCNC: 6.6 G/DL (ref 6–8.4)
PROTHROMBIN TIME: 10.7 SEC (ref 9–12.5)
RBC # BLD AUTO: 4.38 M/UL (ref 4.6–6.2)
SODIUM SERPL-SCNC: 135 MMOL/L (ref 136–145)
WBC # BLD AUTO: 6.43 K/UL (ref 3.9–12.7)

## 2020-09-04 PROCEDURE — 63600175 PHARM REV CODE 636 W HCPCS: Performed by: STUDENT IN AN ORGANIZED HEALTH CARE EDUCATION/TRAINING PROGRAM

## 2020-09-04 PROCEDURE — 87206 SMEAR FLUORESCENT/ACID STAI: CPT | Mod: 91

## 2020-09-04 PROCEDURE — D9220A PRA ANESTHESIA: ICD-10-PCS | Mod: ,,, | Performed by: ANESTHESIOLOGY

## 2020-09-04 PROCEDURE — 85610 PROTHROMBIN TIME: CPT

## 2020-09-04 PROCEDURE — 85025 COMPLETE CBC W/AUTO DIFF WBC: CPT

## 2020-09-04 PROCEDURE — S0020 INJECTION, BUPIVICAINE HYDRO: HCPCS | Performed by: PODIATRIST

## 2020-09-04 PROCEDURE — 36000709 HC OR TIME LEV III EA ADD 15 MIN: Performed by: PODIATRIST

## 2020-09-04 PROCEDURE — 87075 CULTR BACTERIA EXCEPT BLOOD: CPT | Mod: 59

## 2020-09-04 PROCEDURE — 87102 FUNGUS ISOLATION CULTURE: CPT | Mod: 59

## 2020-09-04 PROCEDURE — 87076 CULTURE ANAEROBE IDENT EACH: CPT

## 2020-09-04 PROCEDURE — 88311 PR  DECALCIFY TISSUE: ICD-10-PCS | Mod: 26,,, | Performed by: PATHOLOGY

## 2020-09-04 PROCEDURE — 63600175 PHARM REV CODE 636 W HCPCS: Performed by: INTERNAL MEDICINE

## 2020-09-04 PROCEDURE — 25000003 PHARM REV CODE 250: Performed by: STUDENT IN AN ORGANIZED HEALTH CARE EDUCATION/TRAINING PROGRAM

## 2020-09-04 PROCEDURE — 86901 BLOOD TYPING SEROLOGIC RH(D): CPT

## 2020-09-04 PROCEDURE — 99232 PR SUBSEQUENT HOSPITAL CARE,LEVL II: ICD-10-PCS | Mod: ,,, | Performed by: INTERNAL MEDICINE

## 2020-09-04 PROCEDURE — D9220A PRA ANESTHESIA: Mod: ,,, | Performed by: ANESTHESIOLOGY

## 2020-09-04 PROCEDURE — 25000003 PHARM REV CODE 250: Performed by: HOSPITALIST

## 2020-09-04 PROCEDURE — 94761 N-INVAS EAR/PLS OXIMETRY MLT: CPT

## 2020-09-04 PROCEDURE — 87147 CULTURE TYPE IMMUNOLOGIC: CPT

## 2020-09-04 PROCEDURE — 99232 SBSQ HOSP IP/OBS MODERATE 35: CPT | Mod: ,,, | Performed by: INTERNAL MEDICINE

## 2020-09-04 PROCEDURE — 63600175 PHARM REV CODE 636 W HCPCS: Performed by: HOSPITALIST

## 2020-09-04 PROCEDURE — 99232 PR SUBSEQUENT HOSPITAL CARE,LEVL II: ICD-10-PCS | Mod: ,,, | Performed by: HOSPITALIST

## 2020-09-04 PROCEDURE — 99232 SBSQ HOSP IP/OBS MODERATE 35: CPT | Mod: ,,, | Performed by: HOSPITALIST

## 2020-09-04 PROCEDURE — 27201423 OPTIME MED/SURG SUP & DEVICES STERILE SUPPLY: Performed by: PODIATRIST

## 2020-09-04 PROCEDURE — 11000001 HC ACUTE MED/SURG PRIVATE ROOM

## 2020-09-04 PROCEDURE — 36000708 HC OR TIME LEV III 1ST 15 MIN: Performed by: PODIATRIST

## 2020-09-04 PROCEDURE — 87205 SMEAR GRAM STAIN: CPT | Mod: 59

## 2020-09-04 PROCEDURE — 87015 SPECIMEN INFECT AGNT CONCNTJ: CPT | Mod: 59

## 2020-09-04 PROCEDURE — 80053 COMPREHEN METABOLIC PANEL: CPT

## 2020-09-04 PROCEDURE — 71000033 HC RECOVERY, INTIAL HOUR: Performed by: PODIATRIST

## 2020-09-04 PROCEDURE — 28306 PR OSTEOTOMY 1ST METATARSAL,BASE/SHAFT: ICD-10-PCS | Mod: 58,TA,, | Performed by: PODIATRIST

## 2020-09-04 PROCEDURE — 88311 DECALCIFY TISSUE: CPT | Performed by: PATHOLOGY

## 2020-09-04 PROCEDURE — 88305 TISSUE EXAM BY PATHOLOGIST: CPT | Mod: 26,,, | Performed by: PATHOLOGY

## 2020-09-04 PROCEDURE — 85730 THROMBOPLASTIN TIME PARTIAL: CPT

## 2020-09-04 PROCEDURE — 28306 INCISION OF METATARSAL: CPT | Mod: 58,TA,, | Performed by: PODIATRIST

## 2020-09-04 PROCEDURE — 37000008 HC ANESTHESIA 1ST 15 MINUTES: Performed by: PODIATRIST

## 2020-09-04 PROCEDURE — 37000009 HC ANESTHESIA EA ADD 15 MINS: Performed by: PODIATRIST

## 2020-09-04 PROCEDURE — 87070 CULTURE OTHR SPECIMN AEROBIC: CPT

## 2020-09-04 PROCEDURE — 87116 MYCOBACTERIA CULTURE: CPT

## 2020-09-04 PROCEDURE — 88311 DECALCIFY TISSUE: CPT | Mod: 26,,, | Performed by: PATHOLOGY

## 2020-09-04 PROCEDURE — 25000003 PHARM REV CODE 250: Performed by: PODIATRIST

## 2020-09-04 PROCEDURE — 88305 TISSUE EXAM BY PATHOLOGIST: CPT | Performed by: PATHOLOGY

## 2020-09-04 PROCEDURE — 88305 TISSUE EXAM BY PATHOLOGIST: ICD-10-PCS | Mod: 26,,, | Performed by: PATHOLOGY

## 2020-09-04 PROCEDURE — 36415 COLL VENOUS BLD VENIPUNCTURE: CPT

## 2020-09-04 RX ORDER — EPHEDRINE SULFATE 50 MG/ML
INJECTION, SOLUTION INTRAVENOUS
Status: DISCONTINUED | OUTPATIENT
Start: 2020-09-04 | End: 2020-09-04

## 2020-09-04 RX ORDER — BUPIVACAINE HYDROCHLORIDE 5 MG/ML
INJECTION, SOLUTION EPIDURAL; INTRACAUDAL
Status: DISCONTINUED | OUTPATIENT
Start: 2020-09-04 | End: 2020-09-04 | Stop reason: HOSPADM

## 2020-09-04 RX ORDER — PROPOFOL 10 MG/ML
VIAL (ML) INTRAVENOUS
Status: DISCONTINUED | OUTPATIENT
Start: 2020-09-04 | End: 2020-09-04

## 2020-09-04 RX ORDER — LIDOCAINE HYDROCHLORIDE 10 MG/ML
INJECTION, SOLUTION EPIDURAL; INFILTRATION; INTRACAUDAL; PERINEURAL
Status: DISCONTINUED | OUTPATIENT
Start: 2020-09-04 | End: 2020-09-04 | Stop reason: HOSPADM

## 2020-09-04 RX ORDER — MIDAZOLAM HYDROCHLORIDE 1 MG/ML
INJECTION, SOLUTION INTRAMUSCULAR; INTRAVENOUS
Status: DISCONTINUED | OUTPATIENT
Start: 2020-09-04 | End: 2020-09-04

## 2020-09-04 RX ORDER — SODIUM CHLORIDE 9 MG/ML
INJECTION, SOLUTION INTRAVENOUS CONTINUOUS PRN
Status: DISCONTINUED | OUTPATIENT
Start: 2020-09-04 | End: 2020-09-04

## 2020-09-04 RX ORDER — LIDOCAINE HCL/PF 100 MG/5ML
SYRINGE (ML) INTRAVENOUS
Status: DISCONTINUED | OUTPATIENT
Start: 2020-09-04 | End: 2020-09-04

## 2020-09-04 RX ORDER — PROPOFOL 10 MG/ML
VIAL (ML) INTRAVENOUS CONTINUOUS PRN
Status: DISCONTINUED | OUTPATIENT
Start: 2020-09-04 | End: 2020-09-04

## 2020-09-04 RX ORDER — KETAMINE HCL IN 0.9 % NACL 50 MG/5 ML
SYRINGE (ML) INTRAVENOUS
Status: DISCONTINUED | OUTPATIENT
Start: 2020-09-04 | End: 2020-09-04

## 2020-09-04 RX ADMIN — Medication 10 MG: at 08:09

## 2020-09-04 RX ADMIN — PROPOFOL 30 MG: 10 INJECTION, EMULSION INTRAVENOUS at 06:09

## 2020-09-04 RX ADMIN — MIDAZOLAM HYDROCHLORIDE 2 MG: 1 INJECTION, SOLUTION INTRAMUSCULAR; INTRAVENOUS at 06:09

## 2020-09-04 RX ADMIN — INSULIN ASPART 4 UNITS: 100 INJECTION, SOLUTION INTRAVENOUS; SUBCUTANEOUS at 11:09

## 2020-09-04 RX ADMIN — PROPOFOL 20 MG: 10 INJECTION, EMULSION INTRAVENOUS at 06:09

## 2020-09-04 RX ADMIN — ENOXAPARIN SODIUM 40 MG: 40 INJECTION SUBCUTANEOUS at 11:09

## 2020-09-04 RX ADMIN — INSULIN ASPART 4 UNITS: 100 INJECTION, SOLUTION INTRAVENOUS; SUBCUTANEOUS at 09:09

## 2020-09-04 RX ADMIN — EPHEDRINE SULFATE 5 MG: 50 INJECTION INTRAVENOUS at 08:09

## 2020-09-04 RX ADMIN — ATORVASTATIN CALCIUM 80 MG: 20 TABLET, FILM COATED ORAL at 09:09

## 2020-09-04 RX ADMIN — INSULIN DETEMIR 40 UNITS: 100 INJECTION, SOLUTION SUBCUTANEOUS at 11:09

## 2020-09-04 RX ADMIN — LIDOCAINE HYDROCHLORIDE 80 MG: 20 INJECTION, SOLUTION INTRAVENOUS at 06:09

## 2020-09-04 RX ADMIN — CEFTRIAXONE SODIUM 2 G: 2 INJECTION, SOLUTION INTRAVENOUS at 07:09

## 2020-09-04 RX ADMIN — EPHEDRINE SULFATE 10 MG: 50 INJECTION INTRAVENOUS at 08:09

## 2020-09-04 RX ADMIN — MELATONIN TAB 3 MG 6 MG: 3 TAB at 11:09

## 2020-09-04 RX ADMIN — CARVEDILOL 12.5 MG: 12.5 TABLET, FILM COATED ORAL at 11:09

## 2020-09-04 RX ADMIN — ASPIRIN 81 MG: 81 TABLET, COATED ORAL at 09:09

## 2020-09-04 RX ADMIN — LISINOPRIL 40 MG: 20 TABLET ORAL at 09:09

## 2020-09-04 RX ADMIN — Medication 30 MG: at 06:09

## 2020-09-04 RX ADMIN — PROPOFOL 100 MCG/KG/MIN: 10 INJECTION, EMULSION INTRAVENOUS at 06:09

## 2020-09-04 RX ADMIN — CARVEDILOL 12.5 MG: 12.5 TABLET, FILM COATED ORAL at 09:09

## 2020-09-04 RX ADMIN — SODIUM CHLORIDE: 0.9 INJECTION, SOLUTION INTRAVENOUS at 06:09

## 2020-09-04 NOTE — PROGRESS NOTES
[facility] - ICU 14 WT  Infectious Disease  Progress Note    Patient Name: Billy Sheffield Miguel  MRN: [id number]  Admission Date: 9/1/2020  Length of Stay: 3 days  Attending Physician: Rodrigo Hendrix MD  Primary Care Provider: BRAD Baker MD    Isolation Status: Airborne and Contact and Droplet  Assessment/Plan:      * Diabetic foot infection  A: Osteomyelitis of left 1st metatarsal per clinical exam and MRI, two overlying wounds probe to bone. Vitals stable. WBCs WNL and continue to decrease. Wound culture growing Klebsiella pneumoniae and Strep agalactiae, blood cultures NGTD.     P:   -Continue ceftriaxone 2g [id number].   -Will followup on noninvasive vascular studies and vascular surgery recommendations.  -Will followup on micro/path results from surgical resection of bone by podiatry.  -Podiatry awaiting vascular recommendations prior to surgery.   -Will continue to follow.       Dominic Paul MD  Infectious Disease  [facility] - ICU 14 WT    Subjective:     Principal Problem:Diabetic foot infection    HPI: 54 yo M with Hx of DM2, osteomyelitis of L 1st digit (s/p amputation 07/04), PVD, CAD (past MI), HTN, HLD sent by podiatry clinic due to suspected infection at amputation site. Patient has had delayed healing of the surgical wound despite multiple courses of PO antibiotics. Cultures from 07/04 grew Prevotella for which he was prescribed doxycycline and metronidazole. The wound ultimately dehisced and repeat cultures from 07/28 grew Strep agalactiae for which he was prescribed clindamycin. Yesterday at followup podiatry appointment worsening purulence and erythema were noted, patient was sent to ED. Patient denies pain at the amputation site, though he admits to diabetic neuropathy. He tested positive for COVID-19 with preadmission screen, only symptom is dry cough of 1 month duration.   Interval History: No complaints. Scheduled for debridement today. Tolerating  abx.    Review of Systems   Constitutional: Negative for chills and fever.   Skin: Positive for wound.   All other systems reviewed and are negative.    Objective:     Vital Signs (Most Recent):  Temp: 98 °F (36.7 °C) (09/04/20 0942)  Pulse: 62 (09/04/20 0942)  Resp: 20 (09/04/20 0942)  BP: (!) 157/78 (09/04/20 0942)  SpO2: 97 % (09/04/20 0942) Vital Signs (24h Range):  Temp:  [98 °F (36.7 °C)-98.8 °F (37.1 °C)] 98 °F (36.7 °C)  Pulse:  [58-62] 62  Resp:  [18-22] 20  SpO2:  [95 %-99 %] 97 %  BP: (107-159)/(53-83) 157/78     Weight: 120.2 kg (265 lb)  Body mass index is 34.01 kg/m².    Estimated Creatinine Clearance: 126.3 mL/min (based on SCr of 0.9 mg/dL).    Physical Exam  Vitals signs and nursing note reviewed.   Constitutional:       General: He is not in acute distress.     Appearance: Normal appearance. He is normal weight. He is not toxic-appearing.   HENT:      Head: Normocephalic and atraumatic.      Right Ear: External ear normal.      Left Ear: External ear normal.      Nose: Nose normal.   Eyes:      Pupils: Pupils are equal, round, and reactive to light.   Neurological:      General: No focal deficit present.      Mental Status: He is alert and oriented to person, place, and time.   Psychiatric:         Mood and Affect: Mood normal.         Behavior: Behavior normal.         Thought Content: Thought content normal.         Judgment: Judgment normal.         Significant Labs:   Blood Culture:   Recent Labs   Lab 07/03/20  1450 07/03/20  1541 07/03/20  2020 09/01/20  2211 09/01/20  2306   LABBLOO No growth after 5 days. No growth after 5 days. No growth after 5 days.  No growth after 5 days. No Growth to date  No Growth to date  No Growth to date No Growth to date  No Growth to date  No Growth to date     CBC:   Recent Labs   Lab 09/03/20  0212 09/04/20  0357   WBC 6.84 6.43   HGB 11.9* 12.7*   HCT 35.3* 37.6*    292     CMP:   Recent Labs   Lab 09/03/20  0212 09/04/20  0357   * 135*   K  4.5 4.4    102   CO2 23 24   * 280*   BUN 16 12   CREATININE 0.9 0.9   CALCIUM 7.9* 8.3*   PROT 5.9* 6.6   ALBUMIN 2.2* 2.6*   BILITOT 0.3 0.3   ALKPHOS 82 97   AST 12 11   ALT 13 9*   ANIONGAP 6* 9   EGFRNONAA >60.0 >60.0     Wound Culture:   Recent Labs   Lab 07/03/20  1540 07/04/20  1212 07/14/20  0830 07/28/20  1617 09/01/20  1731   LABAERO No significant isolate No growth  No growth No growth STREPTOCOCCUS AGALACTIAE (GROUP B)  Rare  Beta-hemolytic streptococci are routinely susceptible to   penicillins,cephalosporins and carbapenems.  * KLEBSIELLA PNEUMONIAE  Rare  *  STREPTOCOCCUS AGALACTIAE (GROUP B)  Few  Beta-hemolytic streptococci are routinely susceptible to   penicillins,cephalosporins and carbapenems.  Susceptibility testing not routinely performed  *       Significant Imaging: I have reviewed all pertinent imaging results/findings within the past 24 hours.

## 2020-09-04 NOTE — INTERVAL H&P NOTE
The patient has been examined and the H&P has been reviewed:    I concur with the findings and no changes have occurred since H&P was written.    Surgery risks, benefits and alternative options discussed and understood by patient/family.          Active Hospital Problems    Diagnosis  POA    *Diabetic foot infection [E11.628, L08.9]  Yes    Coronavirus infection [B34.2]  Yes    Acute osteomyelitis of left foot [M86.172]  Yes    COVID-19 virus detected [U07.1]  Yes    PVD (peripheral vascular disease) [I73.9]  Yes    Uncontrolled type II diabetes mellitus [E11.65]  Yes    Coronary artery disease involving native coronary artery of native heart without angina pectoris [I25.10]  Yes    Dyslipidemia [E78.5]  Yes    Essential hypertension [I10]  Yes      Resolved Hospital Problems   No resolved problems to display.

## 2020-09-04 NOTE — PLAN OF CARE
Plan of care reviewed with pt. Procedure reviewed with pt. Consents signed. DM education provided. See nursing notes

## 2020-09-04 NOTE — ASSESSMENT & PLAN NOTE
A: Osteomyelitis of left 1st metatarsal per clinical exam and MRI, two overlying wounds probe to bone. Vitals stable. WBCs WNL and continue to decrease. Wound culture growing Klebsiella pneumoniae and Strep agalactiae, blood cultures NGTD.     P:   -Continue ceftriaxone 2g [id number].   -Will followup on noninvasive vascular studies and vascular surgery recommendations.  -Will followup on micro/path results from surgical resection of bone by podiatry.  -Podiatry awaiting vascular recommendations prior to surgery.   -Will continue to follow.

## 2020-09-04 NOTE — SUBJECTIVE & OBJECTIVE
Interval History: No complaints. Scheduled for debridement today. Tolerating abx.    Review of Systems   Constitutional: Negative for chills and fever.   Skin: Positive for wound.   All other systems reviewed and are negative.    Objective:     Vital Signs (Most Recent):  Temp: 98 °F (36.7 °C) (09/04/20 0942)  Pulse: 62 (09/04/20 0942)  Resp: 20 (09/04/20 0942)  BP: (!) 157/78 (09/04/20 0942)  SpO2: 97 % (09/04/20 0942) Vital Signs (24h Range):  Temp:  [98 °F (36.7 °C)-98.8 °F (37.1 °C)] 98 °F (36.7 °C)  Pulse:  [58-62] 62  Resp:  [18-22] 20  SpO2:  [95 %-99 %] 97 %  BP: (107-159)/(53-83) 157/78     Weight: 120.2 kg (265 lb)  Body mass index is 34.01 kg/m².    Estimated Creatinine Clearance: 126.3 mL/min (based on SCr of 0.9 mg/dL).    Physical Exam  Vitals signs and nursing note reviewed.   Constitutional:       General: He is not in acute distress.     Appearance: Normal appearance. He is normal weight. He is not toxic-appearing.   HENT:      Head: Normocephalic and atraumatic.      Right Ear: External ear normal.      Left Ear: External ear normal.      Nose: Nose normal.   Eyes:      Pupils: Pupils are equal, round, and reactive to light.   Neurological:      General: No focal deficit present.      Mental Status: He is alert and oriented to person, place, and time.   Psychiatric:         Mood and Affect: Mood normal.         Behavior: Behavior normal.         Thought Content: Thought content normal.         Judgment: Judgment normal.         Significant Labs:   Blood Culture:   Recent Labs   Lab 07/03/20  1450 07/03/20  1541 07/03/20  2020 09/01/20  2211 09/01/20  2306   LABBLOO No growth after 5 days. No growth after 5 days. No growth after 5 days.  No growth after 5 days. No Growth to date  No Growth to date  No Growth to date No Growth to date  No Growth to date  No Growth to date     CBC:   Recent Labs   Lab 09/03/20  0212 09/04/20  0357   WBC 6.84 6.43   HGB 11.9* 12.7*   HCT 35.3* 37.6*    292      CMP:   Recent Labs   Lab 09/03/20  0212 09/04/20  0357   * 135*   K 4.5 4.4    102   CO2 23 24   * 280*   BUN 16 12   CREATININE 0.9 0.9   CALCIUM 7.9* 8.3*   PROT 5.9* 6.6   ALBUMIN 2.2* 2.6*   BILITOT 0.3 0.3   ALKPHOS 82 97   AST 12 11   ALT 13 9*   ANIONGAP 6* 9   EGFRNONAA >60.0 >60.0     Wound Culture:   Recent Labs   Lab 07/03/20  1540 07/04/20  1212 07/14/20  0830 07/28/20  1617 09/01/20  1731   LABAERO No significant isolate No growth  No growth No growth STREPTOCOCCUS AGALACTIAE (GROUP B)  Rare  Beta-hemolytic streptococci are routinely susceptible to   penicillins,cephalosporins and carbapenems.  * KLEBSIELLA PNEUMONIAE  Rare  *  STREPTOCOCCUS AGALACTIAE (GROUP B)  Few  Beta-hemolytic streptococci are routinely susceptible to   penicillins,cephalosporins and carbapenems.  Susceptibility testing not routinely performed  *       Significant Imaging: I have reviewed all pertinent imaging results/findings within the past 24 hours.

## 2020-09-04 NOTE — ANESTHESIA PREPROCEDURE EVALUATION
[facility]  Anesthesia Pre-Operative Evaluation         Patient Name: Billy Rivera  YOB: 1965  MRN: [id number]    SUBJECTIVE:     Pre-operative evaluation for Procedure(s) (LRB):  1st metatarsal resection (Left)     09/04/2020    Billy Rivera is a 55 y.o. male w/ a significant PMHx of CAD, NSTEMI (2013) s/p PCI w/ SHELBY to mid LAD, poorly controlled DM II on insulin (A1c >11), HTN, HLD, MICHAELA, PVD, osteomyelitis of L 1st toe s/p amputation (July 2020) admitted for osteomyelitis of previous amputation site. Wound cultures growing klebsiella and strep. BCx NGTD on Rocephin. COVID(+)    Patient now presents for the above procedure(s).      LDA: None documented.       Peripheral IV - Single Lumen 09/01/20 1548 20 G Right Antecubital (Active)   Site Assessment Clean;Intact;Dry 09/03/20 2000   Line Status Saline locked 09/03/20 2000   Dressing Status Clean;Dry;Intact 09/03/20 2000   Dressing Intervention Integrity maintained 09/03/20 2000   Dressing Change Due 09/04/20 09/03/20 2000   Site Change Due 09/04/20 09/03/20 2000   Reason Not Rotated Not due 09/03/20 2000   Number of days: 2       Prev airway: Method of Intubation: Roa; Inserted by: Anesthesia Resident; Airway Device: Endotracheal Tube; Mask Ventilation: Easy - oral; Intubated: Postinduction; Blade:  (Mc grath 4 ); Airway Device Size: 8.0; Style: Cuffed; Cuff Inflation: Minimal occlusive pressure; Placement Verified By: Auscultation, Capnometry; Grade: Grade I; Complicating Factors: Large/Floppy epiglottis, Oropharyngeal edema or fat, Obesity;    Drips: None documented.      Patient Active Problem List   Diagnosis    Essential hypertension    NSTEMI May 2013 - peak troponin 0.22    Dyslipidemia    Coronary artery disease involving native coronary artery of native heart without angina pectoris    BDR  (background diabetic retinopathy) - Both Eyes    Uncontrolled type II diabetes mellitus    PVD (peripheral vascular disease)    Sleep apnea    Proteinuria    Edema    Hypertension associated with diabetes    Type 2 diabetes mellitus with diabetic peripheral angiopathy without gangrene, with long-term current use of insulin    Onychomycosis of multiple toenails with type 2 diabetes mellitus    Type 2 diabetes mellitus 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, with long-term current use of insulin    Hyponatremia    Anemia    Hypomagnesemia    Diabetic foot ulcer associated with type 2 diabetes mellitus    Insomnia    Hx of colon cancer, stage I    Dyslipidemia associated with type 2 diabetes mellitus    Diabetes mellitus with insulin therapy    Obesity, diabetes, and hypertension syndrome    Class 2 severe obesity with body mass index (BMI) of 35 to 39.9 with serious comorbidity    Diabetes mellitus with microalbuminuria    Osteomyelitis of great toe of left foot    Discharge planning issues    Amputation of left great toe    Diabetes mellitus with peripheral circulatory disorder    Osteomyelitis    Diabetic foot infection    COVID-19 virus detected    Acute osteomyelitis of left foot    Coronavirus infection       Review of patient's allergies indicates:   Allergen Reactions    Penicillins Other (See Comments)     PCN allergy as a child - was told he went into a coma. Tolerates Cefazolin without adverse reactions    Shellfish containing products      Other reaction(s): Unknown    Vancomycin Itching     Tolerated vancomycin 7/2020    Bactrim  [sulfamethoxazole-trimethoprim] Rash       Current Inpatient Medications:   aspirin  81 mg Oral Daily    atorvastatin  80 mg Oral Daily    carvediloL  12.5 mg Oral BID    cefTRIAXone (ROCEPHIN) IVPB  2 g Intravenous Q24H    enoxaparin  40 mg Subcutaneous Q24H    insulin aspart U-100  5 Units  "Subcutaneous TIDWM    insulin detemir U-100  40 Units Subcutaneous QHS    lisinopriL  40 mg Oral Daily       No current facility-administered medications on file prior to encounter.      Current Outpatient Medications on File Prior to Encounter   Medication Sig Dispense Refill    atorvastatin (LIPITOR) 80 MG tablet Take 1 tablet (80 mg total) by mouth once daily. 30 tablet 6    BIOTIN ORAL Take 1 capsule by mouth once daily.      blood sugar diagnostic Strp Strips and lancets    Use before meals and bedtime 150 strip 12    carvediloL (COREG) 12.5 MG tablet Take 1 tablet (12.5 mg total) by mouth 2 (two) times daily. 60 tablet 11    insulin glargine (LANTUS) 100 unit/mL injection Inject 55 Units into the skin every evening. 20 mL 11    insulin regular 100 unit/mL Inj injection Inject 20 Units into the skin 3 (three) times daily before meals.  12    lisinopriL (PRINIVIL,ZESTRIL) 40 MG tablet Take 1 tablet (40 mg total) by mouth once daily. 30 tablet 6    metFORMIN (GLUCOPHAGE) 1000 MG tablet Take 1 tablet (1,000 mg total) by mouth 2 (two) times daily with meals. 60 tablet 6    pen needle, diabetic 31 gauge x 3/16" Ndle Inject 1 each into the skin 3 (three) times daily before meals. (Patient taking differently: Inject 1 each into the skin 4 (four) times daily. ) 100 each 12    aspirin (ECOTRIN) 81 MG EC tablet Take 1 tablet (81 mg total) by mouth once daily.  0       Past Surgical History:   Procedure Laterality Date    COLONOSCOPY N/A 2/17/2017    Procedure: COLONOSCOPY;  Surgeon: Simon Gomez MD;  Location: [facility] ENDO (4TH FLR);  Service: Endoscopy;  Laterality: N/A;    CORONARY ANGIOPLASTY      INCISION AND DRAINAGE FOOT Right 6/5/2018    Procedure: INCISION AND DRAINAGE, FOOT;  Surgeon: Bridget Santana DPM;  Location: [facility] OR 1ST FLR;  Service: Podiatry;  Laterality: Right;  Request mini C arm in room. request wound vac with medium black sponge    INCISION AND DRAINAGE FOOT Right 6/7/2018    " Procedure: INCISION AND DRAINAGE, FOOT;  Surgeon: Emelia Brock DPM;  [facility];  Service: Podiatry;  Laterality: Right;    INCISION AND DRAINAGE OF ABSCESS Right 6/2/2018    Procedure: INCISION AND DRAINAGE, ABSCESS;  Surgeon: Bridget Santana DPM;  [facility];  Service: General;  Laterality: Right;    REMOVAL OF FOREIGN BODY FROM FOOT Right 6/7/2018    Procedure: REMOVAL, FOREIGN BODY, FOOT;  Surgeon: Emelia Brock DPM;  [facility];  Service: Podiatry;  Laterality: Right;    TOE AMPUTATION Left 7/4/2020    Procedure: AMPUTATION, TOE;  Surgeon: Bridget Santana DPM;  Location: [facility] OR [address] [place] [address];  Service: Podiatry;  Laterality: Left;    TONSILLECTOMY         Social History     Socioeconomic History    Marital status:      Spouse name: Not on file    Number of children: Not on file    Years of education: Not on file    Highest education level: Not on file   Occupational History    Not on file   Social Needs    Financial resource strain: Not on file    Food insecurity     Worry: Not on file     Inability: Not on file    Transportation needs     Medical: Not on file     Non-medical: Not on file   Tobacco Use    Smoking status: Never Smoker    Smokeless tobacco: Never Used   Substance and Sexual Activity    Alcohol use: Yes     Comment: rare x1 beer-     Drug use: No    Sexual activity: Not Currently   Lifestyle    Physical activity     Days per week: Not on file     Minutes per session: Not on file    Stress: Not on file   Relationships    Social connections     Talks on phone: Not on file     Gets together: Not on file     Attends Voodoo service: Not on file     Active member of club or organization: Not on file     Attends meetings of clubs or organizations: Not on file     Relationship status: Not on file   Other Topics Concern    Not on file   Social History Narrative    Not on file       OBJECTIVE:     Vital Signs Range (Last  24H):  Temp:  [36.7 °C (98 °F)-37.1 °C (98.8 °F)]   Pulse:  [58-62]   Resp:  [18-22]   BP: (107-159)/(53-83)   SpO2:  [95 %-99 %]       Significant Labs:  Lab Results   Component Value Date    WBC 6.43 09/04/2020    HGB 12.7 (L) 09/04/2020    HCT 37.6 (L) 09/04/2020     09/04/2020    CHOL 144 08/12/2020    TRIG 192 (H) 08/12/2020    HDL 39 (L) 08/12/2020    ALT 9 (L) 09/04/2020    AST 11 09/04/2020     (L) 09/04/2020    K 4.4 09/04/2020     09/04/2020    CREATININE 0.9 09/04/2020    BUN 12 09/04/2020    CO2 24 09/04/2020    TSH 1.554 08/12/2020    PSA 0.29 08/12/2020    INR 1.0 09/04/2020    HGBA1C 11.5 (H) 08/12/2020       Diagnostic Studies: No relevant studies.    EKG:   Results for orders placed or performed during the hospital encounter of 03/13/17   EKG 12-lead    Collection Time: 03/13/17  3:14 PM    Narrative    Test Reason : C18.7  Blood Pressure :  mmHG  Vent. Rate : 052 BPM     Atrial Rate : 052 BPM     P-R Int : 160 ms          QRS Dur : 100 ms      QT Int : 434 ms       P-R-T Axes : 027 -29 031 degrees     QTc Int : 403 ms    Sinus bradycardia  Left axis deviation  When compared with ECG of 24-MAY-2013 03:32,  Premature atrial complexes are no longer Present  T wave inversion no longer evident in Anterior leads  Confirmed by ARIEL AMEZCUA MD ([id number]) on 3/13/2017 5:58:53 PM    Referred By: AIDA CHAPMAN           Confirmed By:ARIEL AMEZCUA MD       2D ECHO:  TTE:  No results found for this or any previous visit.    SANDRA:  No results found for this or any previous visit.    ASSESSMENT/PLAN:       Anesthesia Evaluation    I have reviewed the Patient Summary Reports.    I have reviewed the Nursing Notes. I have reviewed the NPO Status.   I have reviewed the Medications.     Review of Systems  Anesthesia Hx:  History of prior surgery of interest to airway management or planning:  Denies Personal Hx of Anesthesia complications.   Hematology/Oncology:         -- Anemia:   Cardiovascular:   Hypertension  Past MI CAD asymptomatic  PVD hyperlipidemia    Pulmonary:   Sleep Apnea    Hepatic/GI:  Hepatic/GI Normal    Neurological:  Neurology Normal    Endocrine:   Diabetes, poorly controlled, type 2, using insulin    Psych:  Psychiatric Normal           Physical Exam  General:  Well nourished    Airway/Jaw/Neck:  Airway Findings: Mouth Opening: Normal Tongue: Normal  General Airway Assessment: Adult  Mallampati: II  TM Distance: Normal, at least 6 cm  Jaw/Neck Findings:  Neck ROM: Normal ROM      Dental:  Dental Findings: In tact    Chest/Lungs:  Chest/Lungs Findings: Clear to auscultation, Normal Respiratory Rate     Heart/Vascular:  Heart Findings: Normal    Abdomen:  Abdomen Findings: Normal    Musculoskeletal:  Musculoskeletal Findings: Normal   Skin:  Skin Findings:     Mental Status:  Mental Status Findings:  Cooperative, Alert and Oriented         Anesthesia Plan  Type of Anesthesia, risks & benefits discussed:  Anesthesia Type:  general, MAC  Patient's Preference: MAC  Intra-op Monitoring Plan: standard ASA monitors  Intra-op Monitoring Plan Comments:   Post Op Pain Control Plan: per primary service following discharge from PACU and multimodal analgesia  Post Op Pain Control Plan Comments:   Induction:   IV  Beta Blocker:  Patient is on a Beta-Blocker and has received one dose within the past 24 hours (No further documentation required).       Informed Consent: Patient understands risks and agrees with Anesthesia plan.  Questions answered. Anesthesia consent signed with patient.  ASA Score: 3     Day of Surgery Review of History & Physical:    H&P update referred to the surgeon.         Ready For Surgery From Anesthesia Perspective.

## 2020-09-04 NOTE — BRIEF OP NOTE
[facility] - ICU 14 WT  Brief Operative Note    SUMMARY     Surgery Date: 9/4/2020     Surgeon(s) and Role:     * Ainsley Powell, DPM - Primary        Josiah Saunders DPM PGY-1 - 1st Assist        Phoebe GODOYM PGY-2 - 2nd Assist    Pre-op Diagnosis:  Osteomyelitis [M86.9]  Diabetic foot infection [E11.628, L08.9]  Acute osteomyelitis of left foot [M86.172]    Post-op Diagnosis:  Post-Op Diagnosis Codes:     * Osteomyelitis [M86.9]     * Diabetic foot infection [E11.628, L08.9]     * Acute osteomyelitis of left foot [M86.172]    Procedure(s) (LRB):  INCISION AND DRAINAGE, FOOT (Left)  OSTEOTOMY, METATARSAL BONE, 1st METATARSAL RESECTION    Anesthesia: Local MAC    Description of Procedure: Incision and drainage of left forefoot down to bone with excision of infected portion of left 1st metatarsal. Irrigation with 3 L saline via pulse lavage. Specimens collected for micro and path. Deep closure with vicryl suture, skin closure with prolene suture.     Description of the findings of the procedure: Nonviable soft tissues about the distal 1st metatarsal. Distal aspect of 1st metatarsal soft, necrotic, nonviable. All infected and nonviable tissues removed. Specimens sent to micro and path.     Estimated Blood Loss: 100 mL    Estimated Blood Loss has been documented.         Specimens:   Specimen (12h ago, onward)    None          [id number]

## 2020-09-04 NOTE — PROGRESS NOTES
Hospital Medicine  Progress Note  [facility]      Patient Name: Billy Sheffield Miguel  MRN: [id number]  Patient Class: IP- Inpatient   Admission Date: 9/1/2020  Length of Stay: 3 days  Attending Physician: Rodrigo Hendrix MD  Primary Care Provider: BRAD Baker MD    Principal Problem:  Diabetic foot infection      Hospital Course:  56 yo M with PMHx DM type 2, osteomyelitis of L 1st toe s/p amputation, PVD, CAD, HLD, and HTN who presents from podiatry clinic for worsening purulence and erythema at site of previous amputation. The patient underwent amputation of L 1st toe 7/4. Since then, he has been on multiple courses of abx, and he has had delayed wound healing.    Management of infected left foot wound and COVID-19 infection. On IV antibiotics, polymicrobial wound culture, podiatry consulted and planning further operative intervention, that is pending after vascular surgery evaluated patient and based on imaging recommended continued surgical intervention.     Interval History:     Pt is awaiting trip to OR for planning surgical intervention    No acute complaints, no acute signs or symptoms of covid infection    Pt is requesting 2800 calorie or double portion diet, instructed nursing, can place verbal order for this- Diabetic diet double portion on pts return from the OR.       Review of Systems   Constitutional: Negative for activity change, appetite change and fever.   HENT: Negative for sore throat and tinnitus.    Eyes: Negative for visual disturbance.   Cardiovascular: Negative for chest pain and leg swelling.   Gastrointestinal: Negative for abdominal pain.   Genitourinary: Negative for dysuria.   Skin: Negative for rash.   Neurological: Negative for dizziness.   Psychiatric/Behavioral: Negative for confusion.       Physical Exam:  Vital Signs (Most Recent):  Temp: 98.3 °F (36.8 °C) (09/04/20 1644)  Pulse: (!) 56 (09/04/20 1644)  Resp: 16 (09/04/20 1644)  BP: (!) 155/75  (09/04/20 1644)  SpO2: 100 % (09/04/20 1644) Vital Signs (24h Range):  Temp:  [98 °F (36.7 °C)-98.8 °F (37.1 °C)] 98.3 °F (36.8 °C)  Pulse:  [56-62] 56  Resp:  [16-22] 16  SpO2:  [95 %-100 %] 100 %  BP: (116-159)/(53-83) 155/75   Intake/Outake:  This Shift:  No intake/output data recorded.    Net I/O past 24h:   No intake or output data in the 24 hours ending 09/04/20 1845      Weight: 120.2 kg (265 lb)  Body mass index is 34.01 kg/m².    Physical Exam  Vitals signs and nursing note reviewed.   Constitutional:       Appearance: He is not ill-appearing or toxic-appearing.   Cardiovascular:      Rate and Rhythm: Normal rate and regular rhythm.      Comments: Decreased LE pulses  Pulmonary:      Effort: Pulmonary effort is normal. No respiratory distress.   Abdominal:      General: Abdomen is flat.      Palpations: Abdomen is soft.   Musculoskeletal:      Comments: Left foot in gauze dressing   Neurological:      General: No focal deficit present.      Mental Status: He is oriented to person, place, and time.                   Laboratory:  Lab Results   Component Value Date    [id number] Positive (A) 09/01/2020       Recent Labs   Lab 09/02/20 0234 09/03/20 0212 09/04/20 0357   WBC 7.74 6.84 6.43   LYMPH 22.2  1.7 18.4  1.3 22.2  1.4   HGB 12.3* 11.9* 12.7*   HCT 36.9* 35.3* 37.6*    255 292     Recent Labs   Lab 09/02/20 0234 09/03/20 0212 09/04/20 0357   * 133* 135*   K 4.5 4.5 4.4    104 102   CO2 22* 23 24   BUN 20 16 12   CREATININE 0.8 0.9 0.9   * 317* 280*   CALCIUM 8.3* 7.9* 8.3*     Recent Labs   Lab 09/01/20  1546 09/02/20 0234 09/03/20 0212 09/04/20  0357   ALKPHOS  --  84 82 97   ALT  --  9* 13 9*   AST  --  9* 12 11   ALBUMIN  --  2.4* 2.2* 2.6*   PROT  --  6.2 5.9* 6.6   BILITOT  --  0.5 0.3 0.3   INR 1.0  --   --  1.0        Recent Labs     09/02/20  0234   FERRITIN 470*       All labs within the last 24 hours were reviewed.     Microbiology:  Microbiology Results  (last 7 days)     Procedure Component Value Units Date/Time    Blood culture [[id number]] Collected: 09/01/20 2306    Order Status: Completed Specimen: Blood Updated: 09/04/20 0613     Blood Culture, Routine No Growth to date      No Growth to date      No Growth to date    Blood culture [[id number]] Collected: 09/01/20 2211    Order Status: Completed Specimen: Blood from Peripheral, Antecubital, Right Updated: 09/03/20 2312     Blood Culture, Routine No Growth to date      No Growth to date      No Growth to date    Aerobic culture (Specify Source) **CANNOT BE ORDERED AS STAT* [[id number]]  (Abnormal)  (Susceptibility) Collected: 09/01/20 1731    Order Status: Completed Specimen: Incision site from Toe, Left Foot Updated: 09/03/20 1306     Aerobic Bacterial Culture KLEBSIELLA PNEUMONIAE  Rare        STREPTOCOCCUS AGALACTIAE (GROUP B)  Few  Beta-hemolytic streptococci are routinely susceptible to   penicillins,cephalosporins and carbapenems.  Susceptibility testing not routinely performed              Imaging  All imaging within the last 24 hours was reviewed.       No results found for this or any previous visit.    VAS Ankle Brachial Indices Resting  Indication  ========    Peripheral Vascular Disease    Pressure Lower  ============    Rt PTA BP  153 mmHg  Rt dors pedis A  mmHg  Rt toe BP  40 mmHg  Rt VASILIY post tibial (rt post tib A BP / max brach A BP) 1.14  Rt VASILIY (rt dors ped A BP / max brach A BP) 1.04  Rt ankle BP / max brach A BP   1.14  Rt TBI (rt toe BP / max brach A BP)    0.30  Lt brachial A syst BP  134 mmHg  Lt PTA BP  171 mmHg  Lt dors pedis A  mmHg  Lt toe BP  87 mmHg  Lt VASILIY (lt post tibial A BP / max brach A BP)  1.28  Lt VASILIY dors ped (lt dors ped A BP / max brach A BP)    1.09  Lt ankle BP / max brach A BP   1.28  Lt TBI (lt toe BP / max brach A BP)    0.65    PPG Lower  =========    Rt toe BP - PPG    40 mmHg  Rt toe digit waveform: moderate to severely dampened  Lt 2nd digit BP    87  mmHg  Lt 2nd digit waveform: normal    Comment  ========    There is a discordance noted between segmental pressures and PVR waveforms of the left lower extremity.    Impression  =========    Right Leg: Segmental pressures and PVR waveforms do not suggest any evidence of significant peripheral arterial occlusive disease.    Left Leg: There is a discordance noted between segmental pressures and PVR waveforms. Segmental pressures do not suggest any  evidence of peripheral arterial occlusive disease. However, PVR waveforms suggest minimal peripheral arterial occlusive disease.    -Bilateral Toe PPG's and Pressures were performed.    Rt lower digits: Toe PPG waveforms as described above. TBI of 0.3 with a Great toe pressure of 40 mmHg mmHg is noted.  Lt lower digits: Toe PPG waveforms as described above. TBI of 0.65 with a second toe pressure of 87 mmHg mmHg is noted. (Left  great toe amputation.)    DATE OF SERVICE: 09/03/2020                                                      Sonographer: Zoila Sims  Electronically Signed by: Sreekanth Hercules M.D. at 09/03/2020-15:37      Inpatient Medications:  Scheduled Meds:   aspirin  81 mg Oral Daily    atorvastatin  80 mg Oral Daily    carvediloL  12.5 mg Oral BID    cefTRIAXone (ROCEPHIN) IVPB  2 g Intravenous Q24H    enoxaparin  40 mg Subcutaneous Q24H    insulin aspart U-100  5 Units Subcutaneous TIDWM    insulin detemir U-100  40 Units Subcutaneous QHS    lisinopriL  40 mg Oral Daily                             Continuous Infusions:  PRN Meds:.acetaminophen, dextrose 50%, dextrose 50%, glucagon (human recombinant), glucose, glucose, insulin aspart U-100, melatonin, ondansetron, promethazine (PHENERGAN) IVPB, sodium chloride 0.9%    Assessment and Plan:    Diabetic Foot Infection_Left Foot Osteomyelitis  -polymicrobial wound culture - pan-sensitive, klebsiella and strep group B  -continue ceftriaxone  -Podiatry_Vascular Surgery_infectious disease consults  following.   -NPO today. Will f/u any podiatry recs post operatively, 1st metatarsal resection is planned         Viral Pneumonia due to COVID-19  - COVID-19 testing:   - Isolation: Airborne/Droplet. Surgical mask on patient. Notify Infection Control  - Diagnostics: Trend Q48hrs if stable, more frequently if patient decompensating   Mostly asymptomatic       Diabetes mellitus with insulin therapy  - Hgb A1C 11.5 8/2020  - Home medication: Glargine 55 U HS and metformin 1 g BID, humulin 20 TID WM  - levemir 40 units with MDSSI    - To be NPO at midnight. Monitor BG and titrate as necessary     Dyslipidemia associated with type 2 diabetes mellitus  - Continue home medication Atorvastatin     PVD (peripheral vascular disease)  - Continue home medications ASA and atorvastatin     Coronary artery disease involving native coronary artery of native heart without angina pectoris  -No acute issues  -Continue home ASA, statin     HTN (hypertension)  -Continue home meds: carvedilol 12.5 and lisinopril 40mg    Patient's chronic/stable medical conditions noted in the problem list above will be managed with the patient's home medications as tolerated.     Active Hospital Problems    Diagnosis  POA    *Diabetic foot infection [E11.628, L08.9]  Yes     Priority: 1 - High    Acute osteomyelitis of left foot [M86.172]  Yes     Priority: 1 - High    COVID-19 virus detected [U07.1]  Yes     Priority: 2     Coronary artery disease involving native coronary artery of native heart without angina pectoris [I25.10]  Yes     Priority: 3     Dyslipidemia [E78.5]  Yes     Priority: 4     Essential hypertension [I10]  Yes     Priority: 5     Uncontrolled type II diabetes mellitus [E11.65]  Yes     Priority: 6     Coronavirus infection [B34.2]  Yes    PVD (peripheral vascular disease) [I73.9]  Yes      Resolved Hospital Problems   No resolved problems to display.       VTE Risk Mitigation (From admission, onward)         Ordered      enoxaparin injection 40 mg  Every 24 hours      09/01/20 1810     IP VTE HIGH RISK PATIENT  Once      09/01/20 1810                High Risk Conditions:  Patient has a condition that poses threat to life and bodily function: left foot osteomyelitis      Subsequent Hospital Care  Level 2 95860 Total visit time was 25 minutes or greater with greater than 50% of time spent in counseling and coordination of care.             Rodrigo Hendrix M.D.  Attending Physician  [facility] Medicine Dept.  Pager: [phone]  [facility] -x [phone]

## 2020-09-04 NOTE — PROGRESS NOTES
Hospital Medicine  Progress Note  [facility]      Patient Name: Billy Sheffield Miguel  MRN: [id number]  Patient Class: IP- Inpatient   Admission Date: 9/1/2020  Length of Stay: 2 days  Attending Physician: Rodrigo Hendrix MD  Primary Care Provider: BRAD Baker MD    Principal Problem:  Diabetic foot infection      Hospital Course:  56 yo M with PMHx DM type 2, osteomyelitis of L 1st toe s/p amputation, PVD, CAD, HLD, and HTN who presents from podiatry clinic for worsening purulence and erythema at site of previous amputation. The patient underwent amputation of L 1st toe 7/4. Since then, he has been on multiple courses of abx, and he has had delayed wound healing.    Management of infected left foot wound and COVID-19 infection. On IV antibiotics, polymicrobial wound culture, podiatry consulted and planning further operative intervention, currently is on standby pending vascular surgery consultation.  Interval History:     Vascular surgery has recommending further arterial u/s imaging which is ordered and in discussion with vascular lab to be completed.     Spoke with podiatry pending completion of above study they will plan for operative intervention on 9/4.       Review of Systems   Constitutional: Negative for activity change, appetite change and fever.   HENT: Negative for sore throat and tinnitus.    Eyes: Negative for visual disturbance.   Cardiovascular: Negative for chest pain and leg swelling.   Gastrointestinal: Negative for abdominal pain.   Genitourinary: Negative for dysuria.   Skin: Negative for rash.   Neurological: Negative for dizziness.   Psychiatric/Behavioral: Negative for confusion.       Physical Exam:  Vital Signs (Most Recent):  Temp: 98.2 °F (36.8 °C) (09/03/20 2005)  Pulse: (!) 58 (09/03/20 2005)  Resp: 18 (09/03/20 2005)  BP: (!) 159/83 (09/03/20 2005)  SpO2: 99 % (09/03/20 2005) Vital Signs (24h Range):  Temp:  [98 °F (36.7 °C)-98.3 °F (36.8 °C)] 98.2 °F (36.8  °C)  Pulse:  [58-84] 58  Resp:  [18-20] 18  SpO2:  [97 %-99 %] 99 %  BP: (106-159)/(58-83) 159/83   Intake/Outake:  This Shift:  No intake/output data recorded.    Net I/O past 24h:     Intake/Output Summary (Last 24 hours) at 9/3/2020 2225  Last data filed at 9/3/2020 0000  Gross per 24 hour   Intake 800 ml   Output --   Net 800 ml         Weight: 120.2 kg (265 lb)  Body mass index is 34.01 kg/m².    Physical Exam  Vitals signs and nursing note reviewed.   Constitutional:       Appearance: He is not ill-appearing or toxic-appearing.   Cardiovascular:      Rate and Rhythm: Normal rate and regular rhythm.      Comments: Decreased LE pulses  Pulmonary:      Effort: Pulmonary effort is normal. No respiratory distress.   Abdominal:      General: Abdomen is flat.      Palpations: Abdomen is soft.   Musculoskeletal:      Comments: Left foot in gauze dressing   Neurological:      General: No focal deficit present.      Mental Status: He is oriented to person, place, and time.                   Laboratory:  Lab Results   Component Value Date    [id number] Positive (A) 09/01/2020       Recent Labs   Lab 09/01/20  1546 09/02/20 0234 09/03/20 0212   WBC 11.46 7.74 6.84   LYMPH 9.2*  1.1 22.2  1.7 18.4  1.3   HGB 15.2 12.3* 11.9*   HCT 45.7 36.9* 35.3*   * 285 255     Recent Labs   Lab 09/01/20  1546 09/02/20  0234 09/03/20 0212   * 133* 133*   K 4.7 4.5 4.5   CL 94* 101 104   CO2 23 22* 23   BUN 21* 20 16   CREATININE 1.1 0.8 0.9   * 329* 317*   CALCIUM 9.5 8.3* 7.9*     Recent Labs   Lab 09/01/20  1546 09/02/20  0234 09/03/20 0212   ALKPHOS  --  84 82   ALT  --  9* 13   AST  --  9* 12   ALBUMIN  --  2.4* 2.2*   PROT  --  6.2 5.9*   BILITOT  --  0.5 0.3   INR 1.0  --   --         Recent Labs     09/01/20  1546 09/02/20  0234   FERRITIN  --  470*   .9*  --        All labs within the last 24 hours were reviewed.     Microbiology:  Microbiology Results (last 7 days)     Procedure Component  Value Units Date/Time    Aerobic culture (Specify Source) **CANNOT BE ORDERED AS STAT* [[id number]]  (Abnormal)  (Susceptibility) Collected: 09/01/20 1731    Order Status: Completed Specimen: Incision site from Toe, Left Foot Updated: 09/03/20 1306     Aerobic Bacterial Culture KLEBSIELLA PNEUMONIAE  Rare        STREPTOCOCCUS AGALACTIAE (GROUP B)  Few  Beta-hemolytic streptococci are routinely susceptible to   penicillins,cephalosporins and carbapenems.  Susceptibility testing not routinely performed      Blood culture [[id number]] Collected: 09/01/20 2306    Order Status: Completed Specimen: Blood Updated: 09/03/20 0613     Blood Culture, Routine No Growth to date      No Growth to date    Blood culture [[id number]] Collected: 09/01/20 2211    Order Status: Completed Specimen: Blood from Peripheral, Antecubital, Right Updated: 09/02/20 2312     Blood Culture, Routine No Growth to date      No Growth to date            Imaging  All imaging within the last 24 hours was reviewed.       No results found for this or any previous visit.    VAS Ankle Brachial Indices Resting  Indication  ========    Peripheral Vascular Disease    Pressure Lower  ============    Rt PTA BP  153 mmHg  Rt dors pedis A  mmHg  Rt toe BP  40 mmHg  Rt VASILIY post tibial (rt post tib A BP / max brach A BP) 1.14  Rt VASILIY (rt dors ped A BP / max brach A BP) 1.04  Rt ankle BP / max brach A BP   1.14  Rt TBI (rt toe BP / max brach A BP)    0.30  Lt brachial A syst BP  134 mmHg  Lt PTA BP  171 mmHg  Lt dors pedis A  mmHg  Lt toe BP  87 mmHg  Lt VASILIY (lt post tibial A BP / max brach A BP)  1.28  Lt VASILIY dors ped (lt dors ped A BP / max brach A BP)    1.09  Lt ankle BP / max brach A BP   1.28  Lt TBI (lt toe BP / max brach A BP)    0.65    PPG Lower  =========    Rt toe BP - PPG    40 mmHg  Rt toe digit waveform: moderate to severely dampened  Lt 2nd digit BP    87 mmHg  Lt 2nd digit waveform: normal    Comment  ========    There is a discordance  noted between segmental pressures and PVR waveforms of the left lower extremity.    Impression  =========    Right Leg: Segmental pressures and PVR waveforms do not suggest any evidence of significant peripheral arterial occlusive disease.    Left Leg: There is a discordance noted between segmental pressures and PVR waveforms. Segmental pressures do not suggest any  evidence of peripheral arterial occlusive disease. However, PVR waveforms suggest minimal peripheral arterial occlusive disease.    -Bilateral Toe PPG's and Pressures were performed.    Rt lower digits: Toe PPG waveforms as described above. TBI of 0.3 with a Great toe pressure of 40 mmHg mmHg is noted.  Lt lower digits: Toe PPG waveforms as described above. TBI of 0.65 with a second toe pressure of 87 mmHg mmHg is noted. (Left  great toe amputation.)    DATE OF SERVICE: 09/03/2020                                                      Sonographer: Zoila Sims  Electronically Signed by: Sreekanth Hercules M.D. at 09/03/2020-15:37      Inpatient Medications:  Scheduled Meds:   aspirin  81 mg Oral Daily    atorvastatin  80 mg Oral Daily    carvediloL  12.5 mg Oral BID    cefTRIAXone (ROCEPHIN) IVPB  2 g Intravenous Q24H    enoxaparin  40 mg Subcutaneous Q24H    insulin aspart U-100  5 Units Subcutaneous TIDWM    insulin detemir U-100  40 Units Subcutaneous QHS    lisinopriL  40 mg Oral Daily                             Continuous Infusions:  PRN Meds:.acetaminophen, dextrose 50%, dextrose 50%, glucagon (human recombinant), glucose, glucose, insulin aspart U-100, melatonin, ondansetron, promethazine (PHENERGAN) IVPB, sodium chloride 0.9%    Assessment and Plan:    Diabetic Foot Infection_Left Foot Osteomyelitis  -polymicrobial wound culture - pan-sensitive, klebsiella and strep group B  -continue ceftriaxone  -Podiatry_Vascular Surgery_infectious disease consults following.   -NPO @ midnight.         Viral Pneumonia due to COVID-19  - COVID-19  testing:   - Isolation: Airborne/Droplet. Surgical mask on patient. Notify Infection Control  - Diagnostics: Trend Q48hrs if stable, more frequently if patient decompensating   Mostly asymptomatic       Diabetes mellitus with insulin therapy  - Hgb A1C 11.5 8/2020  - Home medication: Glargine 55 U HS and metformin 1 g BID, humulin 20 TID WM  - levemir 40 units with MDSSI    - To be NPO at midnight. Monitor BG and titrate as necessary     Dyslipidemia associated with type 2 diabetes mellitus  - Continue home medication Atorvastatin     PVD (peripheral vascular disease)  - Continue home medications ASA and atorvastatin     Coronary artery disease involving native coronary artery of native heart without angina pectoris  -No acute issues  -Continue home ASA, statin     HTN (hypertension)  -Continue home meds: carvedilol 12.5 and lisinopril 40mg    Patient's chronic/stable medical conditions noted in the problem list above will be managed with the patient's home medications as tolerated.     Active Hospital Problems    Diagnosis  POA    *Diabetic foot infection [E11.628, L08.9]  Yes     Priority: 1 - High    Acute osteomyelitis of left foot [M86.172]  Yes     Priority: 1 - High    COVID-19 virus detected [U07.1]  Yes     Priority: 2     Coronary artery disease involving native coronary artery of native heart without angina pectoris [I25.10]  Yes     Priority: 3     Dyslipidemia [E78.5]  Yes     Priority: 4     Essential hypertension [I10]  Yes     Priority: 5     Uncontrolled type II diabetes mellitus [E11.65]  Yes     Priority: 6     Coronavirus infection [B34.2]  Yes    PVD (peripheral vascular disease) [I73.9]  Yes      Resolved Hospital Problems   No resolved problems to display.       VTE Risk Mitigation (From admission, onward)         Ordered     enoxaparin injection 40 mg  Every 24 hours      09/01/20 1810     IP VTE HIGH RISK PATIENT  Once      09/01/20 1810                High Risk Conditions:  Patient  has a condition that poses threat to life and bodily function: left foot osteomyelitis      Subsequent Hospital Care  Level 2 86033 Total visit time was 25 minutes or greater with greater than 50% of time spent in counseling and coordination of care.             Rodrigo Hendrix M.D.  Attending Physician  [facility] Medicine Dept.  Pager: [phone]  [facility] -[phone]

## 2020-09-04 NOTE — OP NOTE
Operative Note       Surgery Date: 9/4/2020     Surgeon(s) and Role:     * Ainsley Powell, DPM - Primary        Josiah Saunders DPM PGY-1 - 1st Assist        Phoebe Jeffrey DPM PGY-2 - 2nd Assist    Pre-op Diagnosis:  Osteomyelitis [M86.9]  Diabetic foot infection [E11.628, L08.9]  Acute osteomyelitis of left foot [M86.172]    Post-op Diagnosis: Post-Op Diagnosis Codes:     * Osteomyelitis [M86.9]     * Diabetic foot infection [E11.628, L08.9]     * Acute osteomyelitis of left foot [M86.172]    Procedure(s) (LRB):  INCISION AND DRAINAGE, FOOT (Left)  OSTEOTOMY, METATARSAL BONE, 1st METATARSAL RESECTION    Anesthesia: Local MAC    Procedure in Detail/Findings:  The patient was brought to the operating room on a stretcher and left on the stretcher for the entirety of the case. Following the successful induction of MAC anesthesia, a tourniquet was placed on the left ankle, and a block of 20cc of 1% lidocaine plain and 0.5% bupivacaine plain was given.  The left foot was then prepped, draped in a typical sterile manner.     Attention was then directed to the left 1st ray where an incision was made which ellipsed out both wounds and extended along the medial 1st ray in a modified racquet shape. Dissection was carried down to bone. Bleeding vessels were cauterized as needed. Full thickness soft tissue flaps were created at the level of the 1st metatarsal. Nonviable and infected distal aspect of the 1st metatarsal was removed via sagittal saw. All avascular tissues including tendon, capsule, and infected tissue was removed. Specimens were collected from known infected tissue to be sent to path and [facility]. No further signs of tracking, abscess or infection were noted. The wound was then flushed with 3000 mL sterile saline solution via pulse lavage. Clean margin specimens were then taken from the residuum of the 1st metatarsal. Tourniquet was inflated to 250 mmHg and hemostasis was achieved by use of tourniquet, electrocautery,  and direct pressure, tourniquet was then deflated. The deep subcutaneous tissues were reapproximated using 3-0 Vicryl and the skin edges were then closed using 4-0 prolene suture material.       Incision site was dressed with xeroform and covered with a sterile dressing consisting of 4 X 4s, kerlix,cast padding, and ACE . The patient tolerated the procedure and anesthesia well. The patient recovered in the operating room for 30 minutes with vital signs stable and vascular status intact as well as the following post op instructions.   1. Keep dressing dry and intact until podiatry rounds   2. Avoid excessive ambulation, ambulate with surgical shoe on at all times. Ok to partial weightbear on heel for short transfers.   3. Ice and elevate foot when at rest.  4. Medical management to continue while inpatient.   5. Contact Podiatry for all post op follow up care and if any problems arise.        Estimated Blood Loss: 100 mL           Specimens (From admission, onward)    None        Implants: * No implants in log *            Disposition: PACU - hemodynamically stable.           Condition: Stable    Attestation:  I was present and scrubbed for the entire procedure.

## 2020-09-04 NOTE — NURSING
0700: report from Heidi TURPIN. Pt DM 2, on 2000cal DM diet. Alert, oriented, continent, ambulatory. Dressing to left foot. Delayed wound healing r/t DM. Pt has been NPO since midnight. Probable OR today for I&D. Vascular right VASILIY performed yesterday, pt cleared for podiatry. Consent for I&D not currently on chart. Pt asleep in bed, side rails raised  X2, call light in reach, bed in lowest position. Continue to monitor.   0930: consents signed and placed on chart. Pre op checklist completed  1045: MD at bedside. Pt up in chair  1640: update from OR. Pt should be transported in 30 minutes. Waiting for room to be cleaned. ADA, 2000cal, double portioned diet entered for following OR per verbal order.   1840: pt off the floor to the OR.   1700: updates given to oncoming nurse

## 2020-09-05 LAB
ACID FAST MOD KINY STN SPEC: NORMAL
ALBUMIN SERPL BCP-MCNC: 2.3 G/DL (ref 3.5–5.2)
ALP SERPL-CCNC: 92 U/L (ref 55–135)
ALT SERPL W/O P-5'-P-CCNC: 19 U/L (ref 10–44)
ANION GAP SERPL CALC-SCNC: 8 MMOL/L (ref 8–16)
AST SERPL-CCNC: 21 U/L (ref 10–40)
BASOPHILS # BLD AUTO: 0.04 K/UL (ref 0–0.2)
BASOPHILS NFR BLD: 0.7 % (ref 0–1.9)
BILIRUB SERPL-MCNC: 0.3 MG/DL (ref 0.1–1)
BUN SERPL-MCNC: 13 MG/DL (ref 6–20)
CALCIUM SERPL-MCNC: 8.2 MG/DL (ref 8.7–10.5)
CHLORIDE SERPL-SCNC: 103 MMOL/L (ref 95–110)
CO2 SERPL-SCNC: 24 MMOL/L (ref 23–29)
CREAT SERPL-MCNC: 0.9 MG/DL (ref 0.5–1.4)
DIFFERENTIAL METHOD: ABNORMAL
EOSINOPHIL # BLD AUTO: 0.2 K/UL (ref 0–0.5)
EOSINOPHIL NFR BLD: 2.5 % (ref 0–8)
ERYTHROCYTE [DISTWIDTH] IN BLOOD BY AUTOMATED COUNT: 13.2 % (ref 11.5–14.5)
EST. GFR  (AFRICAN AMERICAN): >60 ML/MIN/1.73 M^2
EST. GFR  (NON AFRICAN AMERICAN): >60 ML/MIN/1.73 M^2
GLUCOSE SERPL-MCNC: 267 MG/DL (ref 70–110)
HCT VFR BLD AUTO: 35.3 % (ref 40–54)
HGB BLD-MCNC: 11.6 G/DL (ref 14–18)
IMM GRANULOCYTES # BLD AUTO: 0.03 K/UL (ref 0–0.04)
IMM GRANULOCYTES NFR BLD AUTO: 0.5 % (ref 0–0.5)
LYMPHOCYTES # BLD AUTO: 1.4 K/UL (ref 1–4.8)
LYMPHOCYTES NFR BLD: 22.7 % (ref 18–48)
MCH RBC QN AUTO: 28.7 PG (ref 27–31)
MCHC RBC AUTO-ENTMCNC: 32.9 G/DL (ref 32–36)
MCV RBC AUTO: 87 FL (ref 82–98)
MONOCYTES # BLD AUTO: 0.8 K/UL (ref 0.3–1)
MONOCYTES NFR BLD: 12.4 % (ref 4–15)
MYCOBACTERIUM SPEC QL CULT: NORMAL
NEUTROPHILS # BLD AUTO: 3.7 K/UL (ref 1.8–7.7)
NEUTROPHILS NFR BLD: 61.2 % (ref 38–73)
NRBC BLD-RTO: 0 /100 WBC
PLATELET # BLD AUTO: 278 K/UL (ref 150–350)
PMV BLD AUTO: 9.3 FL (ref 9.2–12.9)
POCT GLUCOSE: 243 MG/DL (ref 70–110)
POCT GLUCOSE: 303 MG/DL (ref 70–110)
POCT GLUCOSE: 307 MG/DL (ref 70–110)
POCT GLUCOSE: 342 MG/DL (ref 70–110)
POTASSIUM SERPL-SCNC: 4.3 MMOL/L (ref 3.5–5.1)
PROT SERPL-MCNC: 5.9 G/DL (ref 6–8.4)
RBC # BLD AUTO: 4.04 M/UL (ref 4.6–6.2)
SODIUM SERPL-SCNC: 135 MMOL/L (ref 136–145)
WBC # BLD AUTO: 6.03 K/UL (ref 3.9–12.7)

## 2020-09-05 PROCEDURE — 99232 SBSQ HOSP IP/OBS MODERATE 35: CPT | Mod: ,,, | Performed by: HOSPITALIST

## 2020-09-05 PROCEDURE — 11000001 HC ACUTE MED/SURG PRIVATE ROOM

## 2020-09-05 PROCEDURE — C9399 UNCLASSIFIED DRUGS OR BIOLOG: HCPCS | Performed by: HOSPITALIST

## 2020-09-05 PROCEDURE — 63600175 PHARM REV CODE 636 W HCPCS: Performed by: INTERNAL MEDICINE

## 2020-09-05 PROCEDURE — 85025 COMPLETE CBC W/AUTO DIFF WBC: CPT

## 2020-09-05 PROCEDURE — 36415 COLL VENOUS BLD VENIPUNCTURE: CPT

## 2020-09-05 PROCEDURE — 25000003 PHARM REV CODE 250: Performed by: HOSPITALIST

## 2020-09-05 PROCEDURE — 80053 COMPREHEN METABOLIC PANEL: CPT

## 2020-09-05 PROCEDURE — 99233 PR SUBSEQUENT HOSPITAL CARE,LEVL III: ICD-10-PCS | Mod: ,,, | Performed by: INTERNAL MEDICINE

## 2020-09-05 PROCEDURE — 63600175 PHARM REV CODE 636 W HCPCS: Performed by: HOSPITALIST

## 2020-09-05 PROCEDURE — 99232 PR SUBSEQUENT HOSPITAL CARE,LEVL II: ICD-10-PCS | Mod: ,,, | Performed by: HOSPITALIST

## 2020-09-05 PROCEDURE — 94761 N-INVAS EAR/PLS OXIMETRY MLT: CPT

## 2020-09-05 PROCEDURE — 99233 SBSQ HOSP IP/OBS HIGH 50: CPT | Mod: ,,, | Performed by: INTERNAL MEDICINE

## 2020-09-05 RX ORDER — INSULIN ASPART 100 [IU]/ML
7 INJECTION, SOLUTION INTRAVENOUS; SUBCUTANEOUS
Status: DISCONTINUED | OUTPATIENT
Start: 2020-09-05 | End: 2020-09-06

## 2020-09-05 RX ADMIN — INSULIN ASPART 7 UNITS: 100 INJECTION, SOLUTION INTRAVENOUS; SUBCUTANEOUS at 11:09

## 2020-09-05 RX ADMIN — INSULIN ASPART 4 UNITS: 100 INJECTION, SOLUTION INTRAVENOUS; SUBCUTANEOUS at 08:09

## 2020-09-05 RX ADMIN — LISINOPRIL 40 MG: 20 TABLET ORAL at 08:09

## 2020-09-05 RX ADMIN — ACETAMINOPHEN 650 MG: 325 TABLET ORAL at 05:09

## 2020-09-05 RX ADMIN — INSULIN ASPART 8 UNITS: 100 INJECTION, SOLUTION INTRAVENOUS; SUBCUTANEOUS at 11:09

## 2020-09-05 RX ADMIN — CARVEDILOL 12.5 MG: 12.5 TABLET, FILM COATED ORAL at 08:09

## 2020-09-05 RX ADMIN — ASPIRIN 81 MG: 81 TABLET, COATED ORAL at 08:09

## 2020-09-05 RX ADMIN — INSULIN ASPART 8 UNITS: 100 INJECTION, SOLUTION INTRAVENOUS; SUBCUTANEOUS at 07:09

## 2020-09-05 RX ADMIN — ATORVASTATIN CALCIUM 80 MG: 20 TABLET, FILM COATED ORAL at 08:09

## 2020-09-05 RX ADMIN — CEFTRIAXONE SODIUM 2 G: 2 INJECTION, SOLUTION INTRAVENOUS at 05:09

## 2020-09-05 RX ADMIN — INSULIN ASPART 7 UNITS: 100 INJECTION, SOLUTION INTRAVENOUS; SUBCUTANEOUS at 04:09

## 2020-09-05 RX ADMIN — INSULIN ASPART 5 UNITS: 100 INJECTION, SOLUTION INTRAVENOUS; SUBCUTANEOUS at 07:09

## 2020-09-05 RX ADMIN — INSULIN ASPART 4 UNITS: 100 INJECTION, SOLUTION INTRAVENOUS; SUBCUTANEOUS at 04:09

## 2020-09-05 RX ADMIN — INSULIN DETEMIR 46 UNITS: 100 INJECTION, SOLUTION SUBCUTANEOUS at 08:09

## 2020-09-05 RX ADMIN — ENOXAPARIN SODIUM 40 MG: 40 INJECTION SUBCUTANEOUS at 08:09

## 2020-09-05 NOTE — ANESTHESIA POSTPROCEDURE EVALUATION
Anesthesia Post Evaluation    Patient: Billy Rivera    Procedure(s) Performed: Procedure(s) (LRB):  INCISION AND DRAINAGE, FOOT (Left)  OSTEOTOMY, METATARSAL BONE, 1st METATARSAL RESECTION    Final Anesthesia Type: general    Patient location during evaluation: PACU  Patient participation: Yes- Able to Participate  Level of consciousness: awake and alert and oriented  Post-procedure vital signs: reviewed and stable  Pain management: adequate  Airway patency: patent  MICHAELA mitigation strategies: Intraoperative administration of CPAP, nasopharyngeal airway, or oral appliance during sedation and Multimodal analgesia  PONV status at discharge: No PONV  Anesthetic complications: no      Cardiovascular status: hemodynamically stable  Respiratory status: unassisted  Hydration status: euvolemic  Follow-up not needed.          Vitals Value Taken Time   BP  09/04/20 2209   Temp  09/04/20 2209   Pulse  09/04/20 2209   Resp  09/04/20 2209   SpO2  09/04/20 2209         No case tracking events are documented in the log.      Pain/Gabriel Score: No data recorded

## 2020-09-05 NOTE — PLAN OF CARE
Problem: Fall Injury Risk  Goal: Absence of Fall and Fall-Related Injury  Intervention: Identify and Manage Contributors to Fall Injury Risk  Flowsheets (Taken 9/5/2020 0105)  Self-Care Promotion: independence encouraged  Medication Review/Management: medications reviewed     Problem: Adult Inpatient Plan of Care  Goal: Absence of Hospital-Acquired Illness or Injury  Intervention: Identify and Manage Fall Risk  Flowsheets (Taken 9/5/2020 0105)  Safety Promotion/Fall Prevention: assistive device/personal item within reach  Intervention: Prevent VTE (venous thromboembolism)  Flowsheets (Taken 9/5/2020 0105)  VTE Prevention/Management: bleeding precautions maintained  Goal: Optimal Comfort and Wellbeing  Intervention: Provide Person-Centered Care  Flowsheets (Taken 9/5/2020 0105)  Trust Relationship/Rapport:   care explained   questions encourage     Pt in bed, easily aroused via verbal stimuli.  No acute distress noted.  Afebrile.  No SOB noted at this time.  Spo2 ranging in the mid to high 90s on room air.  Assisted pt with bedtime care.  Pt refusing tele and continuous SpO2.  Bed to lowest limit, call light in hand, No complaints voiced at this time.

## 2020-09-05 NOTE — TRANSFER OF CARE
"Anesthesia Transfer of Care Note    Patient: Billy Sheffield Miguel    Procedure(s) Performed: Procedure(s) (LRB):  INCISION AND DRAINAGE, FOOT (Left)  OSTEOTOMY, METATARSAL BONE, 1st METATARSAL RESECTION    Patient location: Other: [facility] 14th Floor    Anesthesia Type: general    Transport from OR: Transported from OR on 2-3 L/min O2 by NC with adequate spontaneous ventilation    Post pain: adequate analgesia    Post assessment: no apparent anesthetic complications and tolerated procedure well    Post vital signs: stable    Level of consciousness: awake, alert and oriented    Nausea/Vomiting: no nausea/vomiting    Complications: none    Transfer of care protocol was followedComments: Patient returned to previous room. Handoff to PAPI Flores complete.       Last vitals:   Visit Vitals  BP (!) 155/75 (BP Location: Left arm, Patient Position: Sitting)   Pulse (!) 56   Temp 36.8 °C (98.3 °F) (Oral)   Resp 16   Ht 6' 2.02" (1.88 m)   Wt 120.2 kg (265 lb)   SpO2 100%   BMI 34.01 kg/m²     "

## 2020-09-05 NOTE — ASSESSMENT & PLAN NOTE
A: Osteomyelitis of left 1st metatarsal per clinical exam and MRI, two overlying wounds probe to bone. Vitals stable. WBCs WNL and continue to decrease. Wound culture growing Klebsiella pneumoniae and Strep agalactiae, blood cultures NGTD.     P:   - S/p partial ray amputation  - Continue ceftriaxone 2g [id number].   - Awaiting proximal culture and biopsy  - Will followup on micro/path results from surgical resection of bone by podiatry.   - Will continue to follow.

## 2020-09-05 NOTE — PLAN OF CARE
Pt free from injury and in no acute distress. Denies pain. Safety measures WNL. Will continue to monitor.

## 2020-09-05 NOTE — CONSULTS
[facility]  Telemedicine Consult Note    Patient Name: Billy Rivera  MRN: [id number]  Admission Date: 9/1/2020  Hospital Length of Stay: 4 days  Attending Physician: Rodrigo Hendrix MD   Primary Care Provider: BRAD Baker MD         Billy Rivera has been accepted for transfer to [facility] and will be followed through telemedicine services beginning 09/06/20 at 7 AM.        Gisella Cheng MD  Department of Hospital Medicine   [facility]

## 2020-09-05 NOTE — SUBJECTIVE & OBJECTIVE
Interval History: Surgery completed. Path and cultures pending. No complaints.     Review of Systems   Constitutional: Negative for fever.   Skin: Positive for wound.   All other systems reviewed and are negative.    Objective:     Vital Signs (Most Recent):  Temp: 99 °F (37.2 °C) (09/05/20 1115)  Pulse: 64 (09/05/20 1115)  Resp: 18 (09/05/20 1115)  BP: (!) 142/83 (09/05/20 1115)  SpO2: 100 % (09/05/20 1115) Vital Signs (24h Range):  Temp:  [98.2 °F (36.8 °C)-99 °F (37.2 °C)] 99 °F (37.2 °C)  Pulse:  [56-64] 64  Resp:  [16-18] 18  SpO2:  [100 %] 100 %  BP: (142-166)/(75-94) 142/83     Weight: 120.2 kg (265 lb)  Body mass index is 34.01 kg/m².    Estimated Creatinine Clearance: 126.3 mL/min (based on SCr of 0.9 mg/dL).    Physical Exam  Vitals signs and nursing note reviewed.   Constitutional:       Appearance: Normal appearance. He is normal weight.   HENT:      Head: Normocephalic and atraumatic.   Eyes:      Extraocular Movements: Extraocular movements intact.      Conjunctiva/sclera: Conjunctivae normal.      Pupils: Pupils are equal, round, and reactive to light.   Skin:     General: Skin is warm and dry.      Comments: Post-op dressing wrapped and dry   Neurological:      General: No focal deficit present.      Mental Status: He is alert. Mental status is at baseline.   Psychiatric:         Mood and Affect: Mood normal.         Behavior: Behavior normal.         Thought Content: Thought content normal.         Judgment: Judgment normal.         Significant Labs:   Blood Culture:   Recent Labs   Lab 07/03/20  1450 07/03/20  1541 07/03/20  2020 09/01/20  2211 09/01/20  2306   LABBLOO No growth after 5 days. No growth after 5 days. No growth after 5 days.  No growth after 5 days. No Growth to date  No Growth to date  No Growth to date  No Growth to date No Growth to date  No Growth to date  No Growth to date  No Growth to date     CBC:   Recent Labs   Lab 09/04/20  0357 09/05/20  0337   WBC 6.43 6.03   HGB  12.7* 11.6*   HCT 37.6* 35.3*    278     CMP:   Recent Labs   Lab 09/04/20  0357 09/05/20  0337   * 135*   K 4.4 4.3    103   CO2 24 24   * 267*   BUN 12 13   CREATININE 0.9 0.9   CALCIUM 8.3* 8.2*   PROT 6.6 5.9*   ALBUMIN 2.6* 2.3*   BILITOT 0.3 0.3   ALKPHOS 97 92   AST 11 21   ALT 9* 19   ANIONGAP 9 8   EGFRNONAA >60.0 >60.0     Urine Culture: No results for input(s): LABURIN in the last 4320 hours.  Wound Culture:   Recent Labs   Lab 07/04/20  1212 07/14/20  0830 07/28/20  1617 09/01/20  1731 09/04/20 2011   LABAERO No growth  No growth No growth STREPTOCOCCUS AGALACTIAE (GROUP B)  Rare  Beta-hemolytic streptococci are routinely susceptible to   penicillins,cephalosporins and carbapenems.  * KLEBSIELLA PNEUMONIAE  Rare  *  STREPTOCOCCUS AGALACTIAE (GROUP B)  Few  Beta-hemolytic streptococci are routinely susceptible to   penicillins,cephalosporins and carbapenems.  Susceptibility testing not routinely performed  * No growth  No growth       Significant Imaging: I have reviewed all pertinent imaging results/findings within the past 24 hours.

## 2020-09-05 NOTE — PROGRESS NOTES
[facility]  Infectious Disease  Progress Note    Patient Name: Billy Sheffield Miguel  MRN: [id number]  Admission Date: 9/1/2020  Length of Stay: 4 days  Attending Physician: Rodrigo Hendrix MD  Primary Care Provider: BRAD Baker MD    Isolation Status: Airborne and Contact and Droplet     Assessment/Plan:      Diabetic foot infection  A: Osteomyelitis of left 1st metatarsal per clinical exam and MRI, two overlying wounds probe to bone. Vitals stable. WBCs WNL and continue to decrease. Wound culture growing Klebsiella pneumoniae and Strep agalactiae, blood cultures NGTD.     P:   - S/p partial ray amputation  - Continue ceftriaxone 2g [id number].   - Awaiting proximal culture and biopsy  - Will followup on micro/path results from surgical resection of bone by podiatry.   - Will continue to follow.        I will follow-up with patient. Please contact us if you have any additional questions.    Dominic Paul MD  Infectious Disease  [facility]    Subjective:     Principal Problem:Acute osteomyelitis of left foot    HPI: 56 yo M with Hx of DM2, osteomyelitis of L 1st digit (s/p amputation 07/04), PVD, CAD (past MI), HTN, HLD sent by podiatry clinic due to suspected infection at amputation site. Patient has had delayed healing of the surgical wound despite multiple courses of PO antibiotics. Cultures from 07/04 grew Prevotella for which he was prescribed doxycycline and metronidazole. The wound ultimately dehisced and repeat cultures from 07/28 grew Strep agalactiae for which he was prescribed clindamycin. Yesterday at followup podiatry appointment worsening purulence and erythema were noted, patient was sent to ED. Patient denies pain at the amputation site, though he admits to diabetic neuropathy. He tested positive for COVID-19 with preadmission screen, only symptom is dry cough of 1 month duration.   Interval History: Surgery completed. Path and cultures pending.  No complaints.     Review of Systems   Constitutional: Negative for fever.   Skin: Positive for wound.   All other systems reviewed and are negative.    Objective:     Vital Signs (Most Recent):  Temp: 99 °F (37.2 °C) (09/05/20 1115)  Pulse: 64 (09/05/20 1115)  Resp: 18 (09/05/20 1115)  BP: (!) 142/83 (09/05/20 1115)  SpO2: 100 % (09/05/20 1115) Vital Signs (24h Range):  Temp:  [98.2 °F (36.8 °C)-99 °F (37.2 °C)] 99 °F (37.2 °C)  Pulse:  [56-64] 64  Resp:  [16-18] 18  SpO2:  [100 %] 100 %  BP: (142-166)/(75-94) 142/83     Weight: 120.2 kg (265 lb)  Body mass index is 34.01 kg/m².    Estimated Creatinine Clearance: 126.3 mL/min (based on SCr of 0.9 mg/dL).    Physical Exam  Vitals signs and nursing note reviewed.   Constitutional:       Appearance: Normal appearance. He is normal weight.   HENT:      Head: Normocephalic and atraumatic.   Eyes:      Extraocular Movements: Extraocular movements intact.      Conjunctiva/sclera: Conjunctivae normal.      Pupils: Pupils are equal, round, and reactive to light.   Skin:     General: Skin is warm and dry.      Comments: Post-op dressing wrapped and dry   Neurological:      General: No focal deficit present.      Mental Status: He is alert. Mental status is at baseline.   Psychiatric:         Mood and Affect: Mood normal.         Behavior: Behavior normal.         Thought Content: Thought content normal.         Judgment: Judgment normal.         Significant Labs:   Blood Culture:   Recent Labs   Lab 07/03/20  1450 07/03/20  1541 07/03/20  2020 09/01/20  2211 09/01/20  2306   LABBLOO No growth after 5 days. No growth after 5 days. No growth after 5 days.  No growth after 5 days. No Growth to date  No Growth to date  No Growth to date  No Growth to date No Growth to date  No Growth to date  No Growth to date  No Growth to date     CBC:   Recent Labs   Lab 09/04/20  0357 09/05/20  0337   WBC 6.43 6.03   HGB 12.7* 11.6*   HCT 37.6* 35.3*    278     CMP:   Recent  Labs   Lab 09/04/20  0357 09/05/20  0337   * 135*   K 4.4 4.3    103   CO2 24 24   * 267*   BUN 12 13   CREATININE 0.9 0.9   CALCIUM 8.3* 8.2*   PROT 6.6 5.9*   ALBUMIN 2.6* 2.3*   BILITOT 0.3 0.3   ALKPHOS 97 92   AST 11 21   ALT 9* 19   ANIONGAP 9 8   EGFRNONAA >60.0 >60.0     Urine Culture: No results for input(s): LABURIN in the last 4320 hours.  Wound Culture:   Recent Labs   Lab 07/04/20  1212 07/14/20  0830 07/28/20  1617 09/01/20  1731 09/04/20 2011   LABAERO No growth  No growth No growth STREPTOCOCCUS AGALACTIAE (GROUP B)  Rare  Beta-hemolytic streptococci are routinely susceptible to   penicillins,cephalosporins and carbapenems.  * KLEBSIELLA PNEUMONIAE  Rare  *  STREPTOCOCCUS AGALACTIAE (GROUP B)  Few  Beta-hemolytic streptococci are routinely susceptible to   penicillins,cephalosporins and carbapenems.  Susceptibility testing not routinely performed  * No growth  No growth       Significant Imaging: I have reviewed all pertinent imaging results/findings within the past 24 hours.

## 2020-09-06 LAB
ALBUMIN SERPL BCP-MCNC: 2.6 G/DL (ref 3.5–5.2)
ALP SERPL-CCNC: 98 U/L (ref 55–135)
ALT SERPL W/O P-5'-P-CCNC: 22 U/L (ref 10–44)
ANION GAP SERPL CALC-SCNC: 8 MMOL/L (ref 8–16)
AST SERPL-CCNC: 18 U/L (ref 10–40)
BACTERIA BLD CULT: NORMAL
BASOPHILS # BLD AUTO: 0.04 K/UL (ref 0–0.2)
BASOPHILS NFR BLD: 0.5 % (ref 0–1.9)
BILIRUB SERPL-MCNC: 0.5 MG/DL (ref 0.1–1)
BUN SERPL-MCNC: 9 MG/DL (ref 6–20)
CALCIUM SERPL-MCNC: 8.9 MG/DL (ref 8.7–10.5)
CHLORIDE SERPL-SCNC: 103 MMOL/L (ref 95–110)
CO2 SERPL-SCNC: 24 MMOL/L (ref 23–29)
CREAT SERPL-MCNC: 0.8 MG/DL (ref 0.5–1.4)
CRP SERPL-MCNC: 67.5 MG/L (ref 0–8.2)
DIFFERENTIAL METHOD: ABNORMAL
EOSINOPHIL # BLD AUTO: 0.1 K/UL (ref 0–0.5)
EOSINOPHIL NFR BLD: 1.4 % (ref 0–8)
ERYTHROCYTE [DISTWIDTH] IN BLOOD BY AUTOMATED COUNT: 13.2 % (ref 11.5–14.5)
ERYTHROCYTE [SEDIMENTATION RATE] IN BLOOD BY WESTERGREN METHOD: 91 MM/HR (ref 0–23)
EST. GFR  (AFRICAN AMERICAN): >60 ML/MIN/1.73 M^2
EST. GFR  (NON AFRICAN AMERICAN): >60 ML/MIN/1.73 M^2
GLUCOSE SERPL-MCNC: 220 MG/DL (ref 70–110)
HCT VFR BLD AUTO: 37.9 % (ref 40–54)
HGB BLD-MCNC: 12.5 G/DL (ref 14–18)
IMM GRANULOCYTES # BLD AUTO: 0.04 K/UL (ref 0–0.04)
IMM GRANULOCYTES NFR BLD AUTO: 0.5 % (ref 0–0.5)
LYMPHOCYTES # BLD AUTO: 1.4 K/UL (ref 1–4.8)
LYMPHOCYTES NFR BLD: 17.4 % (ref 18–48)
MAGNESIUM SERPL-MCNC: 1.6 MG/DL (ref 1.6–2.6)
MCH RBC QN AUTO: 28.7 PG (ref 27–31)
MCHC RBC AUTO-ENTMCNC: 33 G/DL (ref 32–36)
MCV RBC AUTO: 87 FL (ref 82–98)
MONOCYTES # BLD AUTO: 0.8 K/UL (ref 0.3–1)
MONOCYTES NFR BLD: 10.8 % (ref 4–15)
NEUTROPHILS # BLD AUTO: 5.4 K/UL (ref 1.8–7.7)
NEUTROPHILS NFR BLD: 69.4 % (ref 38–73)
NRBC BLD-RTO: 0 /100 WBC
PHOSPHATE SERPL-MCNC: 3.3 MG/DL (ref 2.7–4.5)
PLATELET # BLD AUTO: 318 K/UL (ref 150–350)
PMV BLD AUTO: 9.2 FL (ref 9.2–12.9)
POCT GLUCOSE: 212 MG/DL (ref 70–110)
POCT GLUCOSE: 262 MG/DL (ref 70–110)
POCT GLUCOSE: 327 MG/DL (ref 70–110)
POTASSIUM SERPL-SCNC: 3.9 MMOL/L (ref 3.5–5.1)
PROT SERPL-MCNC: 7 G/DL (ref 6–8.4)
RBC # BLD AUTO: 4.35 M/UL (ref 4.6–6.2)
SODIUM SERPL-SCNC: 135 MMOL/L (ref 136–145)
WBC # BLD AUTO: 7.78 K/UL (ref 3.9–12.7)

## 2020-09-06 PROCEDURE — 25000003 PHARM REV CODE 250: Performed by: INTERNAL MEDICINE

## 2020-09-06 PROCEDURE — 11000001 HC ACUTE MED/SURG PRIVATE ROOM

## 2020-09-06 PROCEDURE — 99232 PR SUBSEQUENT HOSPITAL CARE,LEVL II: ICD-10-PCS | Mod: ,,, | Performed by: INTERNAL MEDICINE

## 2020-09-06 PROCEDURE — C9399 UNCLASSIFIED DRUGS OR BIOLOG: HCPCS | Performed by: INTERNAL MEDICINE

## 2020-09-06 PROCEDURE — 99024 PR POST-OP FOLLOW-UP VISIT: ICD-10-PCS | Mod: ,,, | Performed by: STUDENT IN AN ORGANIZED HEALTH CARE EDUCATION/TRAINING PROGRAM

## 2020-09-06 PROCEDURE — 99232 SBSQ HOSP IP/OBS MODERATE 35: CPT | Mod: ,,, | Performed by: INTERNAL MEDICINE

## 2020-09-06 PROCEDURE — 85652 RBC SED RATE AUTOMATED: CPT

## 2020-09-06 PROCEDURE — 86140 C-REACTIVE PROTEIN: CPT

## 2020-09-06 PROCEDURE — 85025 COMPLETE CBC W/AUTO DIFF WBC: CPT

## 2020-09-06 PROCEDURE — 63600175 PHARM REV CODE 636 W HCPCS: Performed by: HOSPITALIST

## 2020-09-06 PROCEDURE — 94761 N-INVAS EAR/PLS OXIMETRY MLT: CPT

## 2020-09-06 PROCEDURE — 83735 ASSAY OF MAGNESIUM: CPT

## 2020-09-06 PROCEDURE — 80053 COMPREHEN METABOLIC PANEL: CPT

## 2020-09-06 PROCEDURE — 63600175 PHARM REV CODE 636 W HCPCS: Performed by: INTERNAL MEDICINE

## 2020-09-06 PROCEDURE — 84100 ASSAY OF PHOSPHORUS: CPT

## 2020-09-06 PROCEDURE — 36415 COLL VENOUS BLD VENIPUNCTURE: CPT

## 2020-09-06 PROCEDURE — 99024 POSTOP FOLLOW-UP VISIT: CPT | Mod: ,,, | Performed by: STUDENT IN AN ORGANIZED HEALTH CARE EDUCATION/TRAINING PROGRAM

## 2020-09-06 PROCEDURE — 25000003 PHARM REV CODE 250: Performed by: HOSPITALIST

## 2020-09-06 RX ORDER — INSULIN ASPART 100 [IU]/ML
9 INJECTION, SOLUTION INTRAVENOUS; SUBCUTANEOUS
Status: DISCONTINUED | OUTPATIENT
Start: 2020-09-07 | End: 2020-09-07

## 2020-09-06 RX ADMIN — INSULIN ASPART 7 UNITS: 100 INJECTION, SOLUTION INTRAVENOUS; SUBCUTANEOUS at 03:09

## 2020-09-06 RX ADMIN — CARVEDILOL 12.5 MG: 12.5 TABLET, FILM COATED ORAL at 08:09

## 2020-09-06 RX ADMIN — INSULIN ASPART 8 UNITS: 100 INJECTION, SOLUTION INTRAVENOUS; SUBCUTANEOUS at 03:09

## 2020-09-06 RX ADMIN — ENOXAPARIN SODIUM 40 MG: 40 INJECTION SUBCUTANEOUS at 08:09

## 2020-09-06 RX ADMIN — CEFTRIAXONE SODIUM 2 G: 2 INJECTION, SOLUTION INTRAVENOUS at 05:09

## 2020-09-06 RX ADMIN — INSULIN ASPART 7 UNITS: 100 INJECTION, SOLUTION INTRAVENOUS; SUBCUTANEOUS at 11:09

## 2020-09-06 RX ADMIN — INSULIN ASPART 6 UNITS: 100 INJECTION, SOLUTION INTRAVENOUS; SUBCUTANEOUS at 11:09

## 2020-09-06 RX ADMIN — INSULIN ASPART 7 UNITS: 100 INJECTION, SOLUTION INTRAVENOUS; SUBCUTANEOUS at 07:09

## 2020-09-06 RX ADMIN — LISINOPRIL 40 MG: 20 TABLET ORAL at 08:09

## 2020-09-06 RX ADMIN — INSULIN ASPART 3 UNITS: 100 INJECTION, SOLUTION INTRAVENOUS; SUBCUTANEOUS at 08:09

## 2020-09-06 RX ADMIN — ATORVASTATIN CALCIUM 80 MG: 20 TABLET, FILM COATED ORAL at 08:09

## 2020-09-06 RX ADMIN — ACETAMINOPHEN 650 MG: 325 TABLET ORAL at 07:09

## 2020-09-06 RX ADMIN — INSULIN ASPART 4 UNITS: 100 INJECTION, SOLUTION INTRAVENOUS; SUBCUTANEOUS at 07:09

## 2020-09-06 RX ADMIN — ASPIRIN 81 MG: 81 TABLET, COATED ORAL at 08:09

## 2020-09-06 RX ADMIN — INSULIN DETEMIR 48 UNITS: 100 INJECTION, SOLUTION SUBCUTANEOUS at 08:09

## 2020-09-06 NOTE — PROGRESS NOTES
Hospital Medicine  Progress Note  [facility]      Patient Name: Billy Sheffield Miguel  MRN: [id number]  Patient Class: IP- Inpatient   Admission Date: 9/1/2020  Length of Stay: 5 days  Attending Physician: Gisella Cheng MD  Primary Care Provider: BRAD Baker MD    Principal Problem:  Acute osteomyelitis of left foot      Hospital Course:  56 yo M with PMHx DM type 2, osteomyelitis of L 1st toe s/p amputation, PVD, CAD, HLD, and HTN who presents from podiatry clinic for worsening purulence and erythema at site of previous amputation. The patient underwent amputation of L 1st toe 7/4. Since then, he has been on multiple courses of abx, and he has had delayed wound healing.    Management of infected left foot wound and COVID-19 infection. On IV antibiotics, polymicrobial wound culture, podiatry consulted and planning further operative intervention, that is pending after vascular surgery evaluated patient and based on imaging recommended continued surgical intervention.     Interval History:     S/p OR for 1st metatarsal amputation and wound debridement    Pt reports feeling fine, restless moving from bed to recliner frequently,  Nursing notes he is bearing weight on left foot.  Pt states he is using heel only to weight bear as per podiatry but he is not using surgical shoe. Advocated that patient use surgical shoe, and elevated extremity when he is not transferring or using the bathroom.     Initial Or cultures w/o much on preliminiary results.       Review of Systems   Constitutional: Negative for activity change, appetite change and fever.   HENT: Negative for sore throat and tinnitus.    Eyes: Negative for visual disturbance.   Cardiovascular: Negative for chest pain and leg swelling.   Gastrointestinal: Negative for abdominal pain.   Genitourinary: Negative for dysuria.   Skin: Negative for rash.   Neurological: Negative for dizziness.   Psychiatric/Behavioral: Negative for confusion.        Physical Exam:  Vital Signs (Most Recent):  Temp: 98.9 °F (37.2 °C) (09/05/20 1935)  Pulse: 65 (09/06/20 0715)  Resp: 18 (09/06/20 0715)  BP: (!) 147/82 (09/06/20 0715)  SpO2: 97 % (09/06/20 0715) Vital Signs (24h Range):  Temp:  [98.9 °F (37.2 °C)-99 °F (37.2 °C)] 98.9 °F (37.2 °C)  Pulse:  [64-70] 65  Resp:  [18-19] 18  SpO2:  [96 %-100 %] 97 %  BP: (135-162)/(67-83) 147/82   Intake/[place]:  This Shift:  No intake/output data recorded.    Net I/O past 24h:     Intake/Output Summary (Last 24 hours) at 9/6/2020 0730  Last data filed at 9/5/2020 1800  Gross per 24 hour   Intake 1050 ml   Output --   Net 1050 ml         Weight: 120.2 kg (265 lb)  Body mass index is 34.01 kg/m².    Physical Exam  Vitals signs and nursing note reviewed.   Constitutional:       Appearance: He is not ill-appearing or toxic-appearing.   Cardiovascular:      Rate and Rhythm: Normal rate and regular rhythm.      Comments: Decreased LE pulses  Pulmonary:      Effort: Pulmonary effort is normal. No respiratory distress.   Abdominal:      General: Abdomen is flat.      Palpations: Abdomen is soft.   Musculoskeletal:      Comments: Left foot in gauze dressing   Neurological:      General: No focal deficit present.      Mental Status: He is oriented to person, place, and time.                   Laboratory:  Lab Results   Component Value Date    [id number] Positive (A) 09/01/2020       Recent Labs   Lab 09/03/20  0212 09/04/20  0357 09/05/20  0337   WBC 6.84 6.43 6.03   LYMPH 18.4  1.3 22.2  1.4 22.7  1.4   HGB 11.9* 12.7* 11.6*   HCT 35.3* 37.6* 35.3*    292 278     Recent Labs   Lab 09/03/20  0212 09/04/20  0357 09/05/20  0337 09/06/20  0636   * 135* 135*  --    K 4.5 4.4 4.3  --     102 103  --    CO2 23 24 24  --    BUN 16 12 13  --    CREATININE 0.9 0.9 0.9  --    * 280* 267*  --    CALCIUM 7.9* 8.3* 8.2*  --    MG  --   --   --  1.6   PHOS  --   --   --  3.3     Recent Labs   Lab 09/01/20  1715   09/03/20  0212 09/04/20  0357 09/05/20  0337   ALKPHOS  --    < > 82 97 92   ALT  --    < > 13 9* 19   AST  --    < > 12 11 21   ALBUMIN  --    < > 2.2* 2.6* 2.3*   PROT  --    < > 5.9* 6.6 5.9*   BILITOT  --    < > 0.3 0.3 0.3   INR 1.0  --   --  1.0  --     < > = values in this interval not displayed.        Recent Labs     09/06/20  0636   CRP 67.5*       All labs within the last 24 hours were reviewed.     Microbiology:  Microbiology Results (last 7 days)     Procedure Component Value Units Date/Time    Blood culture [[id number]] Collected: 09/01/20 2306    Order Status: Completed Specimen: Blood Updated: 09/06/20 0612     Blood Culture, Routine No Growth to date      No Growth to date      No Growth to date      No Growth to date      No Growth to date    Blood culture [[id number]] Collected: 09/01/20 2211    Order Status: Completed Specimen: Blood from Peripheral, Antecubital, Right Updated: 09/05/20 2312     Blood Culture, Routine No Growth to date      No Growth to date      No Growth to date      No Growth to date      No Growth to date    AFB Culture & Smear [[id number]] Collected: 09/04/20 2010    Order Status: Completed Specimen: Foot, Left Updated: 09/05/20 2127     AFB Culture & Smear Culture in progress    Narrative:      1. Left 1st Metatarsal Bone    AFB Culture & Smear [[id number]] Collected: 09/04/20 2010    Order Status: Completed Specimen: Foot, Left Updated: 09/05/20 2127     AFB Culture & Smear Culture in progress    Narrative:      1. Left 1st Metatarsal Bone Clean Margin    Aerobic culture [[id number]] Collected: 09/04/20 2011    Order Status: Completed Specimen: Bone from Foot, Left Updated: 09/05/20 0745     Aerobic Bacterial Culture No growth    Narrative:      1. Left 1st Metatarsal Bone    Aerobic culture [[id number]] Collected: 09/04/20 2011    Order Status: Completed Specimen: Bone from Foot, Left Updated: 09/05/20 0745     Aerobic Bacterial Culture No growth    Narrative:      1. Left 1st  Metatarsal Bone Clean Margin    Gram stain [[id number]] Collected: 09/04/20 2010    Order Status: Completed Specimen: Foot, Left Updated: 09/04/20 2141     Gram Stain Result No WBC's      No organisms seen    Narrative:      1. Left 1st Metatarsal Bone Clean Margin    Gram stain [[id number]] Collected: 09/04/20 2010    Order Status: Completed Specimen: Foot, Left Updated: 09/04/20 2138     Gram Stain Result Rare WBC's      No organisms seen    Narrative:      1. Left 1st Metatarsal Bone    Fungus culture [[id number]] Collected: 09/04/20 2010    Order Status: Sent Specimen: Foot, Left Updated: 09/04/20 2051    Culture, Anaerobe [[id number]] Collected: 09/04/20 2010    Order Status: Sent Specimen: Foot, Left Updated: 09/04/20 2050    Fungus culture [[id number]] Collected: 09/04/20 2010    Order Status: Sent Specimen: Foot, Left Updated: 09/04/20 2049    Culture, Anaerobe [[id number]] Collected: 09/04/20 2010    Order Status: Sent Specimen: Foot, Left Updated: 09/04/20 2048    Aerobic culture (Specify Source) **CANNOT BE ORDERED AS STAT* [[id number]]  (Abnormal)  (Susceptibility) Collected: 09/01/20 1731    Order Status: Completed Specimen: Incision site from Toe, Left Foot Updated: 09/03/20 1306     Aerobic Bacterial Culture KLEBSIELLA PNEUMONIAE  Rare        STREPTOCOCCUS AGALACTIAE (GROUP B)  Few  Beta-hemolytic streptococci are routinely susceptible to   penicillins,cephalosporins and carbapenems.  Susceptibility testing not routinely performed              Imaging  All imaging within the last 24 hours was reviewed.       No results found for this or any previous visit.    X-Ray Foot Complete Left  Narrative: EXAMINATION:  XR FOOT COMPLETE 3 VIEW LEFT    CLINICAL HISTORY:  postoperative X ray;.    TECHNIQUE:  AP, lateral and oblique views of the left foot were performed.    COMPARISON:  September 1, 2020    FINDINGS:  The patient has undergone revision of the great toe amputation.  The distal aspect of the 1st metatarsal  has been taken.  There are postsurgical changes appreciated.  Impression: Sats post amputation of the distal aspect of 1st metatarsal.    Electronically signed by: Billy Florian  Date:    09/04/2020  Time:    22:50      Inpatient Medications:  Scheduled Meds:   aspirin  81 mg Oral Daily    atorvastatin  80 mg Oral Daily    carvediloL  12.5 mg Oral BID    cefTRIAXone (ROCEPHIN) IVPB  2 g Intravenous Q24H    enoxaparin  40 mg Subcutaneous Q24H    insulin aspart U-100  7 Units Subcutaneous TIDWM    insulin detemir U-100  46 Units Subcutaneous QHS    lisinopriL  40 mg Oral Daily                             Continuous Infusions:  PRN Meds:.acetaminophen, dextrose 50%, dextrose 50%, glucagon (human recombinant), glucose, glucose, insulin aspart U-100, melatonin, ondansetron, promethazine (PHENERGAN) IVPB, sodium chloride 0.9%    Assessment and Plan:    Diabetic Foot Infection_Left Foot Osteomyelitis  -polymicrobial wound culture - pan-sensitive, klebsiella and strep group B  -continue ceftriaxone  -Podiatry_Vascular Surgery_infectious disease consults following.   -Will f/u any podiatry recs post operatively, 1st metatarsal resection is planned   -f/u any post-op culture results and any new ID recs  -will consult virtual team as pt clinically stable pending final abx and post-op plans      Viral Pneumonia due to COVID-19  - COVID-19 testing:   - Isolation: Airborne/Droplet. Surgical mask on patient. Notify Infection Control  - Diagnostics: Trend Q48hrs if stable, more frequently if patient decompensating   Mostly asymptomatic       Diabetes mellitus with insulin therapy  - Hgb A1C 11.5 8/2020  - Home medication: Glargine 55 U HS and metformin 1 g BID, humulin 20 TID WM  -basal and bolus insulin doses titrated today - Levemir 46 units, prandial to 7 units,   -     Dyslipidemia associated with type 2 diabetes mellitus  - Continue home medication Atorvastatin     PVD (peripheral vascular disease)  - Continue home  medications ASA and atorvastatin     Coronary artery disease involving native coronary artery of native heart without angina pectoris  -No acute issues  -Continue home ASA, statin     HTN (hypertension)  -Continue home meds: carvedilol 12.5 and lisinopril 40mg    Patient's chronic/stable medical conditions noted in the problem list above will be managed with the patient's home medications as tolerated.     Active Hospital Problems    Diagnosis  POA    *Acute osteomyelitis of left foot [M86.172]  Yes     Priority: 1 - High    Diabetic foot infection [E11.628, L08.9]  Yes     Priority: 1 - High    COVID-19 virus detected [U07.1]  Yes     Priority: 2     Coronary artery disease involving native coronary artery of native heart without angina pectoris [I25.10]  Yes     Priority: 3     PVD (peripheral vascular disease) [I73.9]  Yes     Priority: 4     Dyslipidemia [E78.5]  Yes     Priority: 4     Essential hypertension [I10]  Yes     Priority: 5     Uncontrolled type II diabetes mellitus [E11.65]  Yes     Priority: 6     Coronavirus infection [B34.2]  Yes      Resolved Hospital Problems   No resolved problems to display.       VTE Risk Mitigation (From admission, onward)         Ordered     enoxaparin injection 40 mg  Every 24 hours      09/01/20 1810     IP VTE HIGH RISK PATIENT  Once      09/01/20 1810                High Risk Conditions:  Patient has a condition that poses threat to life and bodily function: left foot osteomyelitis      Subsequent Hospital Care  Level 2 08599 Total visit time was 25 minutes or greater with greater than 50% of time spent in counseling and coordination of care.             Rodrigo Hendrix M.D.  Attending Physician  [facility] Medicine Dept.  Pager: [phone]  [facility] -x [phone]

## 2020-09-06 NOTE — ASSESSMENT & PLAN NOTE
A: s/p left partial 1st ray amputation 09/04 for osteomyelitis. POD#2 dressing change, healing as expected, no clinical signs of infection. Vitals stable. WBCs WNL. ESR further increased today from 5 days ago, CRP decreased today from 5 days ago. NGTD from surgical cultures.    Plan:  -Followup on surgical cultures.  -Antibiotic management per ID.   -Continue local wound care.   -Rest of care per primary  -Podiatry will continue to follow.

## 2020-09-06 NOTE — PLAN OF CARE
Plan of care reviewed with pt regarding treatment plan and expected outcomes. Questions encouraged and answered. Pt verbalized understanding. Pt progressing as expected. Will continue to monitor and enforce plan of care.

## 2020-09-06 NOTE — PROGRESS NOTES
[facility]  Podiatry  Progress Note    Patient Name: Billy Yatesn  MRN: [id number]  Admission Date: 9/1/2020  Hospital Length of Stay: 5 days  Attending Physician: Gisella Cheng MD  Primary Care Provider: BRAD Baker MD     Subjective:     Interval History: NAEON. Patient still reports he is feeling great overall. No foot pain at current time. No new pedal complaints. Dressings clean and intact other than [id number] spot of strikethrough plantarly. Vitals stable. WBCs WNL. ESR further increased today from 5 days ago, CRP decreased today from 5 days ago. NGTD from surgical cultures.     Follow-up For: Procedure(s) (LRB):  INCISION AND DRAINAGE, FOOT (Left)  OSTEOTOMY, METATARSAL BONE, 1st METATARSAL RESECTION    Post-Operative Day: 2 Days Post-Op    Scheduled Meds:   aspirin  81 mg Oral Daily    atorvastatin  80 mg Oral Daily    carvediloL  12.5 mg Oral BID    cefTRIAXone (ROCEPHIN) IVPB  2 g Intravenous Q24H    enoxaparin  40 mg Subcutaneous Q24H    insulin aspart U-100  7 Units Subcutaneous TIDWM    insulin detemir U-100  46 Units Subcutaneous QHS    lisinopriL  40 mg Oral Daily     Continuous Infusions:  PRN Meds:acetaminophen, dextrose 50%, dextrose 50%, glucagon (human recombinant), glucose, glucose, insulin aspart U-100, melatonin, ondansetron, promethazine (PHENERGAN) IVPB, sodium chloride 0.9%    Review of Systems   Constitutional: Negative for chills and fever.   HENT: Negative for congestion, ear pain, rhinorrhea and sneezing.    Eyes: Negative for pain.   Respiratory: Positive for cough. Negative for shortness of breath.    Cardiovascular: Positive for leg swelling. Negative for chest pain.   Gastrointestinal: Positive for vomiting (chronic). Negative for abdominal pain, constipation, diarrhea and nausea.   Endocrine: Negative for polyuria.   Genitourinary: Negative for decreased urine volume, difficulty urinating, dysuria and flank pain.   Musculoskeletal:  Negative for back pain, neck pain and neck stiffness.   Skin: Positive for wound. Negative for rash.   Neurological: Positive for numbness. Negative for weakness and headaches.   Psychiatric/Behavioral: Negative for confusion. The patient is not nervous/anxious.      Objective:     Vital Signs (Most Recent):  Temp: 98.5 °F (36.9 °C) (09/06/20 1115)  Pulse: (!) 54 (09/06/20 1115)  Resp: 18 (09/06/20 1115)  BP: 114/76 (09/06/20 1115)  SpO2: 97 % (09/06/20 1115) Vital Signs (24h Range):  Temp:  [98.5 °F (36.9 °C)-98.9 °F (37.2 °C)] 98.5 °F (36.9 °C)  Pulse:  [54-70] 54  Resp:  [18-19] 18  SpO2:  [96 %-97 %] 97 %  BP: (114-162)/(67-82) 114/76     Weight: 120.2 kg (265 lb)  Body mass index is 34.01 kg/m².    Foot Exam    Right Foot/Ankle     Inspection and Palpation  Tenderness: none   Swelling: (Diffuse edema to right foot )  Skin Exam: skin intact;     Neurovascular  Dorsalis pedis: absent  Posterior tibial: absent  Saphenous nerve sensation: diminished  Tibial nerve sensation: diminished  Superficial peroneal nerve sensation: diminished  Deep peroneal nerve sensation: diminished  Sural nerve sensation: diminished      Left Foot/Ankle      Inspection and Palpation  Tenderness: none   Swelling: none   Skin Exam: no drainage, no cellulitis and no erythema (surgical wound)    Neurovascular  Dorsalis pedis: absent  Posterior tibial: absent  Saphenous nerve sensation: diminished  Tibial nerve sensation: diminished  Superficial peroneal nerve sensation: diminished  Deep peroneal nerve sensation: diminished  Sural nerve sensation: diminished            Laboratory:  Blood Cultures: No results for input(s): LABBLOO in the last 48 hours.  CBC:   Recent Labs   Lab 09/06/20  0636   WBC 7.78   RBC 4.35*   HGB 12.5*   HCT 37.9*      MCV 87   MCH 28.7   MCHC 33.0     CMP:   Recent Labs   Lab 09/06/20  0636   *   CALCIUM 8.9   ALBUMIN 2.6*   PROT 7.0   *   K 3.9   CO2 24      BUN 9   CREATININE 0.8   ALKPHOS  98   ALT 22   AST 18   BILITOT 0.5     CRP:   Recent Labs   Lab 09/06/20  0636   CRP 67.5*     ESR:   Recent Labs   Lab 09/06/20  0636   SEDRATE 91*     Wound Cultures:   Recent Labs   Lab 07/04/20  1212 07/14/20  0830 07/28/20  1617 09/01/20  1731 09/04/20 2011   LABAERO No growth  No growth No growth STREPTOCOCCUS AGALACTIAE (GROUP B)  Rare  Beta-hemolytic streptococci are routinely susceptible to   penicillins,cephalosporins and carbapenems.  * KLEBSIELLA PNEUMONIAE  Rare  *  STREPTOCOCCUS AGALACTIAE (GROUP B)  Few  Beta-hemolytic streptococci are routinely susceptible to   penicillins,cephalosporins and carbapenems.  Susceptibility testing not routinely performed  * No growth  No growth     Microbiology Results (last 7 days)     Procedure Component Value Units Date/Time    Culture, Anaerobe [[id number]] Collected: 09/04/20 2010    Order Status: Completed Specimen: Foot, Left Updated: 09/06/20 1203     Anaerobic Culture Culture in progress    Narrative:      1. Left 1st Metatarsal Bone    Culture, Anaerobe [[id number]] Collected: 09/04/20 2010    Order Status: Completed Specimen: Foot, Left Updated: 09/06/20 1203     Anaerobic Culture Culture in progress    Narrative:      1. Left 1st Metatarsal Bone Clean Margin    Blood culture [[id number]] Collected: 09/01/20 2306    Order Status: Completed Specimen: Blood Updated: 09/06/20 0612     Blood Culture, Routine No Growth to date      No Growth to date      No Growth to date      No Growth to date      No Growth to date    Blood culture [[id number]] Collected: 09/01/20 2211    Order Status: Completed Specimen: Blood from Peripheral, Antecubital, Right Updated: 09/05/20 2312     Blood Culture, Routine No Growth to date      No Growth to date      No Growth to date      No Growth to date      No Growth to date    AFB Culture & Smear [[id number]] Collected: 09/04/20 2010    Order Status: Completed Specimen: Foot, Left Updated: 09/05/20 2127     AFB Culture & Smear  Culture in progress    Narrative:      1. Left 1st Metatarsal Bone    AFB Culture & Smear [[id number]] Collected: 09/04/20 2010    Order Status: Completed Specimen: Foot, Left Updated: 09/05/20 2127     AFB Culture & Smear Culture in progress    Narrative:      1. Left 1st Metatarsal Bone Clean Margin    Aerobic culture [[id number]] Collected: 09/04/20 2011    Order Status: Completed Specimen: Bone from Foot, Left Updated: 09/05/20 0745     Aerobic Bacterial Culture No growth    Narrative:      1. Left 1st Metatarsal Bone    Aerobic culture [[id number]] Collected: 09/04/20 2011    Order Status: Completed Specimen: Bone from Foot, Left Updated: 09/05/20 0745     Aerobic Bacterial Culture No growth    Narrative:      1. Left 1st Metatarsal Bone Clean Margin    Gram stain [[id number]] Collected: 09/04/20 2010    Order Status: Completed Specimen: Foot, Left Updated: 09/04/20 2141     Gram Stain Result No WBC's      No organisms seen    Narrative:      1. Left 1st Metatarsal Bone Clean Margin    Gram stain [[id number]] Collected: 09/04/20 2010    Order Status: Completed Specimen: Foot, Left Updated: 09/04/20 2138     Gram Stain Result Rare WBC's      No organisms seen    Narrative:      1. Left 1st Metatarsal Bone    Fungus culture [[id number]] Collected: 09/04/20 2010    Order Status: Sent Specimen: Foot, Left Updated: 09/04/20 2051    Fungus culture [[id number]] Collected: 09/04/20 2010    Order Status: Sent Specimen: Foot, Left Updated: 09/04/20 2049    Aerobic culture (Specify Source) **CANNOT BE ORDERED AS STAT* [[id number]]  (Abnormal)  (Susceptibility) Collected: 09/01/20 1731    Order Status: Completed Specimen: Incision site from Toe, Left Foot Updated: 09/03/20 1306     Aerobic Bacterial Culture KLEBSIELLA PNEUMONIAE  Rare        STREPTOCOCCUS AGALACTIAE (GROUP B)  Few  Beta-hemolytic streptococci are routinely susceptible to   penicillins,cephalosporins and carbapenems.  Susceptibility testing not routinely  "performed          Specimen (12h ago, onward)    None          Diagnostic Results  Postop X Ray 9/4: The patient has undergone revision of the great toe amputation.  The distal aspect of the 1st metatarsal has been taken. There are postsurgical changes appreciated.     Clinical Findings:    Left partial 1st ray incision site well coapted without signs of dehiscence, dried blood over 3 stitch length of distal incision but no active bleeding, no drainage, no erythema, no tenderness. Surgical site healing as expected without signs of infection.     Assessment/Plan:     * Acute osteomyelitis of left foot  A: s/p left partial 1st ray amputation 09/04 for osteomyelitis. POD#2 dressing change, healing as expected, no clinical signs of infection. Vitals stable. WBCs WNL. ESR further increased today from 5 days ago, CRP decreased today from 5 days ago. NGTD from surgical cultures.    Plan:  -Followup on surgical cultures.  -Antibiotic management per ID.   -Continue local wound care.   -Rest of care per primary  -Podiatry will continue to follow.       Coronavirus infection  Per primary team    COVID-19 virus detected  Per primary team    Diabetic foot infection  See "acute osteomyelitis of left foot"    PVD (peripheral vascular disease)  Per primary team    Uncontrolled type II diabetes mellitus  Per primary team    Coronary artery disease involving native coronary artery of native heart without angina pectoris  Per primary team    Dyslipidemia  Per primary team    Essential hypertension  Per primary team        Josiah Saunders DPM PGY-1  Podiatric Medicine & Surgery  [facility]    "

## 2020-09-06 NOTE — SUBJECTIVE & OBJECTIVE
Subjective:     Interval History: NAEON. Patient still reports he is feeling great overall. No foot pain at current time. No new pedal complaints. Dressings clean and intact other than [id number] spot of strikethrough plantarly. Vitals stable. WBCs WNL. ESR further increased today from 5 days ago, CRP decreased today from 5 days ago. NGTD from surgical cultures.     Follow-up For: Procedure(s) (LRB):  INCISION AND DRAINAGE, FOOT (Left)  OSTEOTOMY, METATARSAL BONE, 1st METATARSAL RESECTION    Post-Operative Day: 2 Days Post-Op    Scheduled Meds:   aspirin  81 mg Oral Daily    atorvastatin  80 mg Oral Daily    carvediloL  12.5 mg Oral BID    cefTRIAXone (ROCEPHIN) IVPB  2 g Intravenous Q24H    enoxaparin  40 mg Subcutaneous Q24H    insulin aspart U-100  7 Units Subcutaneous TIDWM    insulin detemir U-100  46 Units Subcutaneous QHS    lisinopriL  40 mg Oral Daily     Continuous Infusions:  PRN Meds:acetaminophen, dextrose 50%, dextrose 50%, glucagon (human recombinant), glucose, glucose, insulin aspart U-100, melatonin, ondansetron, promethazine (PHENERGAN) IVPB, sodium chloride 0.9%    Review of Systems   Constitutional: Negative for chills and fever.   HENT: Negative for congestion, ear pain, rhinorrhea and sneezing.    Eyes: Negative for pain.   Respiratory: Positive for cough. Negative for shortness of breath.    Cardiovascular: Positive for leg swelling. Negative for chest pain.   Gastrointestinal: Positive for vomiting (chronic). Negative for abdominal pain, constipation, diarrhea and nausea.   Endocrine: Negative for polyuria.   Genitourinary: Negative for decreased urine volume, difficulty urinating, dysuria and flank pain.   Musculoskeletal: Negative for back pain, neck pain and neck stiffness.   Skin: Positive for wound. Negative for rash.   Neurological: Positive for numbness. Negative for weakness and headaches.   Psychiatric/Behavioral: Negative for confusion. The patient is not nervous/anxious.       Objective:     Vital Signs (Most Recent):  Temp: 98.5 °F (36.9 °C) (09/06/20 1115)  Pulse: (!) 54 (09/06/20 1115)  Resp: 18 (09/06/20 1115)  BP: 114/76 (09/06/20 1115)  SpO2: 97 % (09/06/20 1115) Vital Signs (24h Range):  Temp:  [98.5 °F (36.9 °C)-98.9 °F (37.2 °C)] 98.5 °F (36.9 °C)  Pulse:  [54-70] 54  Resp:  [18-19] 18  SpO2:  [96 %-97 %] 97 %  BP: (114-162)/(67-82) 114/76     Weight: 120.2 kg (265 lb)  Body mass index is 34.01 kg/m².    Foot Exam    Right Foot/Ankle     Inspection and Palpation  Tenderness: none   Swelling: (Diffuse edema to right foot )  Skin Exam: skin intact;     Neurovascular  Dorsalis pedis: absent  Posterior tibial: absent  Saphenous nerve sensation: diminished  Tibial nerve sensation: diminished  Superficial peroneal nerve sensation: diminished  Deep peroneal nerve sensation: diminished  Sural nerve sensation: diminished      Left Foot/Ankle      Inspection and Palpation  Tenderness: none   Swelling: none   Skin Exam: no drainage, no cellulitis and no erythema (surgical wound)    Neurovascular  Dorsalis pedis: absent  Posterior tibial: absent  Saphenous nerve sensation: diminished  Tibial nerve sensation: diminished  Superficial peroneal nerve sensation: diminished  Deep peroneal nerve sensation: diminished  Sural nerve sensation: diminished            Laboratory:  Blood Cultures: No results for input(s): LABBLOO in the last 48 hours.  CBC:   Recent Labs   Lab 09/06/20  0636   WBC 7.78   RBC 4.35*   HGB 12.5*   HCT 37.9*      MCV 87   MCH 28.7   MCHC 33.0     CMP:   Recent Labs   Lab 09/06/20 0636   *   CALCIUM 8.9   ALBUMIN 2.6*   PROT 7.0   *   K 3.9   CO2 24      BUN 9   CREATININE 0.8   ALKPHOS 98   ALT 22   AST 18   BILITOT 0.5     CRP:   Recent Labs   Lab 09/06/20 0636   CRP 67.5*     ESR:   Recent Labs   Lab 09/06/20 0636   SEDRATE 91*     Wound Cultures:   Recent Labs   Lab 07/04/20  1212 07/14/20  0830 07/28/20  1617 09/01/20  1731 09/04/20 2011    LABAERO No growth  No growth No growth STREPTOCOCCUS AGALACTIAE (GROUP B)  Rare  Beta-hemolytic streptococci are routinely susceptible to   penicillins,cephalosporins and carbapenems.  * KLEBSIELLA PNEUMONIAE  Rare  *  STREPTOCOCCUS AGALACTIAE (GROUP B)  Few  Beta-hemolytic streptococci are routinely susceptible to   penicillins,cephalosporins and carbapenems.  Susceptibility testing not routinely performed  * No growth  No growth     Microbiology Results (last 7 days)     Procedure Component Value Units Date/Time    Culture, Anaerobe [[id number]] Collected: 09/04/20 2010    Order Status: Completed Specimen: Foot, Left Updated: 09/06/20 1203     Anaerobic Culture Culture in progress    Narrative:      1. Left 1st Metatarsal Bone    Culture, Anaerobe [[id number]] Collected: 09/04/20 2010    Order Status: Completed Specimen: Foot, Left Updated: 09/06/20 1203     Anaerobic Culture Culture in progress    Narrative:      1. Left 1st Metatarsal Bone Clean Margin    Blood culture [[id number]] Collected: 09/01/20 2306    Order Status: Completed Specimen: Blood Updated: 09/06/20 0612     Blood Culture, Routine No Growth to date      No Growth to date      No Growth to date      No Growth to date      No Growth to date    Blood culture [[id number]] Collected: 09/01/20 2211    Order Status: Completed Specimen: Blood from Peripheral, Antecubital, Right Updated: 09/05/20 2312     Blood Culture, Routine No Growth to date      No Growth to date      No Growth to date      No Growth to date      No Growth to date    AFB Culture & Smear [[id number]] Collected: 09/04/20 2010    Order Status: Completed Specimen: Foot, Left Updated: 09/05/20 2127     AFB Culture & Smear Culture in progress    Narrative:      1. Left 1st Metatarsal Bone    AFB Culture & Smear [[id number]] Collected: 09/04/20 2010    Order Status: Completed Specimen: Foot, Left Updated: 09/05/20 2127     AFB Culture & Smear Culture in progress    Narrative:      1. Left  1st Metatarsal Bone Clean Margin    Aerobic culture [[id number]] Collected: 09/04/20 2011    Order Status: Completed Specimen: Bone from Foot, Left Updated: 09/05/20 0745     Aerobic Bacterial Culture No growth    Narrative:      1. Left 1st Metatarsal Bone    Aerobic culture [[id number]] Collected: 09/04/20 2011    Order Status: Completed Specimen: Bone from Foot, Left Updated: 09/05/20 0745     Aerobic Bacterial Culture No growth    Narrative:      1. Left 1st Metatarsal Bone Clean Margin    Gram stain [[id number]] Collected: 09/04/20 2010    Order Status: Completed Specimen: Foot, Left Updated: 09/04/20 2141     Gram Stain Result No WBC's      No organisms seen    Narrative:      1. Left 1st Metatarsal Bone Clean Margin    Gram stain [[id number]] Collected: 09/04/20 2010    Order Status: Completed Specimen: Foot, Left Updated: 09/04/20 2138     Gram Stain Result Rare WBC's      No organisms seen    Narrative:      1. Left 1st Metatarsal Bone    Fungus culture [[id number]] Collected: 09/04/20 2010    Order Status: Sent Specimen: Foot, Left Updated: 09/04/20 2051    Fungus culture [[id number]] Collected: 09/04/20 2010    Order Status: Sent Specimen: Foot, Left Updated: 09/04/20 2049    Aerobic culture (Specify Source) **CANNOT BE ORDERED AS STAT* [[id number]]  (Abnormal)  (Susceptibility) Collected: 09/01/20 1731    Order Status: Completed Specimen: Incision site from Toe, Left Foot Updated: 09/03/20 1306     Aerobic Bacterial Culture KLEBSIELLA PNEUMONIAE  Rare        STREPTOCOCCUS AGALACTIAE (GROUP B)  Few  Beta-hemolytic streptococci are routinely susceptible to   penicillins,cephalosporins and carbapenems.  Susceptibility testing not routinely performed          Specimen (12h ago, onward)    None          Diagnostic Results  Postop X Ray 9/4: The patient has undergone revision of the great toe amputation.  The distal aspect of the 1st metatarsal has been taken. There are postsurgical changes appreciated.     Clinical  Findings:    Left partial 1st ray incision site well coapted without signs of dehiscence, dried blood over 3 stitch length of distal incision but no active bleeding, no drainage, no erythema, no tenderness. Surgical site healing as expected without signs of infection.

## 2020-09-06 NOTE — PROGRESS NOTES
[facility] - ICU [facility] Medicine  Telemedicine Progress Note    Patient Name: Billy Sheffield Miguel  MRN: [id number]  Patient Class: IP- Inpatient   Admission Date: 9/1/2020  Length of Stay: 5 days  Attending Physician: Gisella Cheng MD  Primary Care Provider: BRAD Baker MD    [facility]: [facility] 10 Gisella Cheng MD  Virtual Telemedicine Progress Note  Start time: 1020  Chief complaint: Acute osteomyelitis of left foot  The patient location is: [id number] A  The patient arrived at: 9/1/2020  3:22 PM  Present with the patient at the time of the telemed/virtual assessment: telepresenter   End time:  1025  Total time spent with patient: 5 min  I have assessed findings virtually using a telemedicine platform and with assistance of the bedside nurse or telemedicine presenter.  The attending portion of this evaluation, treatment, and documentation was performed per Gisella Chneg MD via audiovisual.    Patient was transferred to the telemedicine service on: 9/6/2020    Subjective:     Admission CC:   Chief Complaint   Patient presents with    Leg Pain     sent from podiatry for admission , infected L foot     Follow up visit for: Acute osteomyelitis of left foot    Interval History / Events Overnight:   The patient is able to provide adequate history. Additional history was obtained from past medical records. No significant events reported by Nursing. Stable on RA.  Patient complains of nothing specific. Symptoms have improved since yesterday. Associated symptoms include: fatigue. Symptoms are decreasing in severity. Alleviating factors include: rest.     Data reviewed 9/6/2020: Lab test(s) reviewed: H&H stable. Hyperglycemia  I have assessed findings virtually using a telemedicine platform and with assistance of the bedside nurse or telemedicine presenter.      Review of Systems   Constitutional: Negative for fever.   Respiratory: Negative for shortness of breath.       Objective:     Vital Signs (Most Recent):  Temp: 98.5 °F (36.9 °C) (09/06/20 1530)  Pulse: (!) 58 (09/06/20 1703)  Resp: 16 (09/06/20 1703)  BP: (!) 140/77 (09/06/20 1530)  SpO2: 98 % (09/06/20 1703) Vital Signs (24h Range):  Temp:  [98.5 °F (36.9 °C)-98.9 °F (37.2 °C)] 98.5 °F (36.9 °C)  Pulse:  [54-70] 58  Resp:  [16-19] 16  SpO2:  [97 %-98 %] 98 %  BP: (114-150)/(67-82) 140/77     Weight: 120.2 kg (265 lb)  Body mass index is 34.01 kg/m².    Intake/Output Summary (Last 24 hours) at 9/6/2020 1808  Last data filed at 9/6/2020 1730  Gross per 24 hour   Intake 1550 ml   Output --   Net 1550 ml      Physical Exam  Constitutional:       General: He is not in acute distress.     Appearance: Normal appearance. He is not diaphoretic.   Eyes:      General: Lids are normal. No scleral icterus.        Right eye: No discharge.         Left eye: No discharge.      Conjunctiva/sclera: Conjunctivae normal.   Neck:      Trachea: Phonation normal.   Cardiovascular:      Rate and Rhythm: Bradycardia present.   Pulmonary:      Effort: Pulmonary effort is normal. No tachypnea, accessory muscle usage or respiratory distress.   Abdominal:      General: There is no distension.   Neurological:      Mental Status: He is alert and oriented to person, place, and time. He is not disoriented.   Psychiatric:         Attention and Perception: Attention normal.         Mood and Affect: Affect normal.         Speech: Speech normal.         Behavior: Behavior is cooperative.         Significant Labs:   Recent Labs   Lab 09/04/20  0357 09/05/20  0337 09/06/20  0636   WBC 6.43 6.03 7.78   HGB 12.7* 11.6* 12.5*   HCT 37.6* 35.3* 37.9*    278 318     Recent Labs   Lab 09/04/20  0357 09/05/20  0337 09/06/20  0636   GRAN 62.2  4.0 61.2  3.7 69.4  5.4   LYMPH 22.2  1.4 22.7  1.4 17.4*  1.4   MONO 12.1  0.8 12.4  0.8 10.8  0.8   EOS 0.2 0.2 0.1     Recent Labs   Lab 09/04/20  0357 09/05/20  0337 09/06/20  0636   * 135* 135*   K  4.4 4.3 3.9    103 103   CO2 24 24 24   BUN 12 13 9   CREATININE 0.9 0.9 0.8   * 267* 220*   CALCIUM 8.3* 8.2* 8.9   ALBUMIN 2.6* 2.3* 2.6*   MG  --   --  1.6   PHOS  --   --  3.3     Recent Labs   Lab 09/01/20  1546  09/04/20  0357 09/05/20  0337 09/06/20  0636   ALKPHOS  --    < > 97 92 98   ALT  --    < > 9* 19 22   AST  --    < > 11 21 18   PROT  --    < > 6.6 5.9* 7.0   BILITOT  --    < > 0.3 0.3 0.5   INR 1.0  --  1.0  --   --    .9*  --   --   --  67.5*    < > = values in this interval not displayed.     Recent Labs   Lab 07/03/20  1530 07/03/20 2019 07/14/20  1006 09/01/20  1546 09/02/20  0234 09/06/20  0636   PROCAL  --  0.17  --   --   --   --   --    LACTATE 1.2 1.3  --   --   --   --   --    FERRITIN  --   --   --   --   --  470*  --    SEDRATE  --   --    < > 45* 47*  --  91*    < > = values in this interval not displayed.     SARS-CoV-2 RNA, Amplification, Qual (no units)   Date Value   09/01/2020 Positive (A)   07/03/2020 Negative     Recent Labs   Lab 04/14/20  1555 07/03/20 2020 08/12/20  0837   HGBA1C 13.1* 11.8* 11.5*      Recent Labs   Lab 09/06/20  0725 09/06/20  1117 09/06/20  1534   POCTGLUCOSE 212* 262* 327*     Recent Labs   Lab 08/12/20  0837   TSH 1.554       Significant Imaging:     Assessment/Plan:      Active Diagnoses:    Diagnosis Date Noted POA    PRINCIPAL PROBLEM:  Acute osteomyelitis of left foot [M86.172] 09/02/2020 Yes    Coronavirus infection [B34.2]  Yes    Diabetic foot infection [E11.628, L08.9] 09/01/2020 Yes    COVID-19 virus detected [U07.1] 09/01/2020 Yes    PVD (peripheral vascular disease) [I73.9] 06/15/2015 Yes    Uncontrolled type II diabetes mellitus [E11.65] 06/15/2015 Yes    Coronary artery disease involving native coronary artery of native heart without angina pectoris [I25.10] 06/25/2013 Yes    Dyslipidemia [E78.5] 05/22/2013 Yes    Essential hypertension [I10] 09/27/2012 Yes      Problems Resolved During this Admission:        Overview / ICU Course:    Billy Rivera is a 55 y.o. male admitted for Acute osteomyelitis of left foot.    Inpatient Medications Prescribed for Management of Current Problems:     Scheduled Meds:    aspirin  81 mg Oral Daily    atorvastatin  80 mg Oral Daily    carvediloL  12.5 mg Oral BID    cefTRIAXone (ROCEPHIN) IVPB  2 g Intravenous Q24H    enoxaparin  40 mg Subcutaneous Q24H    insulin aspart U-100  7 Units Subcutaneous TIDWM    insulin detemir U-100  46 Units Subcutaneous QHS    lisinopriL  40 mg Oral Daily     Continuous Infusions:   As Needed: acetaminophen, dextrose 50%, dextrose 50%, glucagon (human recombinant), glucose, glucose, insulin aspart U-100, melatonin, ondansetron, promethazine (PHENERGAN) IVPB, sodium chloride 0.9%    Assessment and Plan by Problem    Diabetic Foot Infection_Left Foot Osteomyelitis  -polymicrobial wound culture - pan-sensitive, Klebsiella and Strept group B  -continue ceftriaxone  -Podiatry_Vascular Surgery_infectious disease consults following.   -s/p left partial 1st ray amputation 09/04 for osteomyelitis  -f/u post-op culture and biopsy results     Viral Pneumonia due to COVID-19  - COVID-19 testing:   - Isolation: Airborne/Droplet. Surgical mask on patient. Notify Infection Control  - Diagnostics: Trend Q48hrs if stable, more frequently if patient decompensating     Mostly asymptomatic      Diabetes mellitus with insulin therapy  - Hgb A1C 11.5 8/2020  - Home medication: Glargine 55 U HS and metformin 1 g BID, humulin 20 TID WM  -basal and bolus insulin doses increased 9/6     Dyslipidemia associated with type 2 diabetes mellitus  - Continue home medication Atorvastatin     PVD (peripheral vascular disease)  - Continue home medications ASA and atorvastatin     Coronary artery disease involving native coronary artery of native heart without angina pectoris  -No acute issues  -Continue home ASA, statin     HTN (hypertension)  -Continue home meds:  carvedilol 12.5 and lisinopril 40mg       Diet: Diet diabetic [facility]; 2000 Calorie; Double Portions; Isolation Tray - Styrofoam  GI Prophylaxis: Not indicated  Significant LDAs:   IV Access Type: Peripheral  Urinary Catheter Indication if present: Patient Does Not Have Urinary Catheter  Other Lines/Tubes/Drains:    Goals of Care:    Previous admission:  7/3/20  Likely prognosis:  Fair  Code Status: Full Code  Comfort Only: No  Hospice: No  Goals at discharge: remain at home, with physician follow-up    Discharge Planning   DARVIN: 9/8/2020     Code Status: Full Code   Is the patient medically ready for discharge?: No    Reason for patient still in hospital (select all that apply): Laboratory test and Consult recommendations  Discharge Plan A: Home Health, Home with family        VTE Risk Mitigation (From admission, onward)         Ordered     enoxaparin injection 40 mg  Every 24 hours      09/01/20 1810     IP VTE HIGH RISK PATIENT  Once      09/01/20 1810                   Gisella Cheng MD  Department of Hospital Medicine   [facility] - ICU 14 [facility]

## 2020-09-07 LAB
ALBUMIN SERPL BCP-MCNC: 2.6 G/DL (ref 3.5–5.2)
ALP SERPL-CCNC: 90 U/L (ref 55–135)
ALT SERPL W/O P-5'-P-CCNC: 22 U/L (ref 10–44)
ANION GAP SERPL CALC-SCNC: 6 MMOL/L (ref 8–16)
AST SERPL-CCNC: 17 U/L (ref 10–40)
BACTERIA BLD CULT: NORMAL
BACTERIA SPEC AEROBE CULT: ABNORMAL
BASOPHILS # BLD AUTO: 0.03 K/UL (ref 0–0.2)
BASOPHILS NFR BLD: 0.5 % (ref 0–1.9)
BILIRUB SERPL-MCNC: 0.4 MG/DL (ref 0.1–1)
BUN SERPL-MCNC: 10 MG/DL (ref 6–20)
CALCIUM SERPL-MCNC: 8.6 MG/DL (ref 8.7–10.5)
CHLORIDE SERPL-SCNC: 102 MMOL/L (ref 95–110)
CO2 SERPL-SCNC: 26 MMOL/L (ref 23–29)
CREAT SERPL-MCNC: 0.8 MG/DL (ref 0.5–1.4)
DIFFERENTIAL METHOD: ABNORMAL
EOSINOPHIL # BLD AUTO: 0.2 K/UL (ref 0–0.5)
EOSINOPHIL NFR BLD: 2.7 % (ref 0–8)
ERYTHROCYTE [DISTWIDTH] IN BLOOD BY AUTOMATED COUNT: 13.3 % (ref 11.5–14.5)
EST. GFR  (AFRICAN AMERICAN): >60 ML/MIN/1.73 M^2
EST. GFR  (NON AFRICAN AMERICAN): >60 ML/MIN/1.73 M^2
GLUCOSE SERPL-MCNC: 254 MG/DL (ref 70–110)
HCT VFR BLD AUTO: 35.6 % (ref 40–54)
HGB BLD-MCNC: 11.8 G/DL (ref 14–18)
IMM GRANULOCYTES # BLD AUTO: 0.04 K/UL (ref 0–0.04)
IMM GRANULOCYTES NFR BLD AUTO: 0.6 % (ref 0–0.5)
LYMPHOCYTES # BLD AUTO: 1.5 K/UL (ref 1–4.8)
LYMPHOCYTES NFR BLD: 23 % (ref 18–48)
MAGNESIUM SERPL-MCNC: 1.8 MG/DL (ref 1.6–2.6)
MCH RBC QN AUTO: 28.9 PG (ref 27–31)
MCHC RBC AUTO-ENTMCNC: 33.1 G/DL (ref 32–36)
MCV RBC AUTO: 87 FL (ref 82–98)
MONOCYTES # BLD AUTO: 0.9 K/UL (ref 0.3–1)
MONOCYTES NFR BLD: 13.8 % (ref 4–15)
NEUTROPHILS # BLD AUTO: 3.8 K/UL (ref 1.8–7.7)
NEUTROPHILS NFR BLD: 59.4 % (ref 38–73)
NRBC BLD-RTO: 0 /100 WBC
PHOSPHATE SERPL-MCNC: 3.6 MG/DL (ref 2.7–4.5)
PLATELET # BLD AUTO: 296 K/UL (ref 150–350)
PMV BLD AUTO: 9.3 FL (ref 9.2–12.9)
POCT GLUCOSE: 199 MG/DL (ref 70–110)
POCT GLUCOSE: 243 MG/DL (ref 70–110)
POCT GLUCOSE: 244 MG/DL (ref 70–110)
POCT GLUCOSE: 296 MG/DL (ref 70–110)
POCT GLUCOSE: 299 MG/DL (ref 70–110)
POTASSIUM SERPL-SCNC: 4.3 MMOL/L (ref 3.5–5.1)
PROT SERPL-MCNC: 6.7 G/DL (ref 6–8.4)
RBC # BLD AUTO: 4.09 M/UL (ref 4.6–6.2)
SODIUM SERPL-SCNC: 134 MMOL/L (ref 136–145)
WBC # BLD AUTO: 6.4 K/UL (ref 3.9–12.7)

## 2020-09-07 PROCEDURE — 63600175 PHARM REV CODE 636 W HCPCS: Performed by: INTERNAL MEDICINE

## 2020-09-07 PROCEDURE — 99232 PR SUBSEQUENT HOSPITAL CARE,LEVL II: ICD-10-PCS | Mod: ,,, | Performed by: INTERNAL MEDICINE

## 2020-09-07 PROCEDURE — 94761 N-INVAS EAR/PLS OXIMETRY MLT: CPT

## 2020-09-07 PROCEDURE — 99232 SBSQ HOSP IP/OBS MODERATE 35: CPT | Mod: ,,, | Performed by: INTERNAL MEDICINE

## 2020-09-07 PROCEDURE — 25000003 PHARM REV CODE 250: Performed by: HOSPITALIST

## 2020-09-07 PROCEDURE — 83735 ASSAY OF MAGNESIUM: CPT

## 2020-09-07 PROCEDURE — 84100 ASSAY OF PHOSPHORUS: CPT

## 2020-09-07 PROCEDURE — 63600175 PHARM REV CODE 636 W HCPCS: Performed by: HOSPITALIST

## 2020-09-07 PROCEDURE — 85025 COMPLETE CBC W/AUTO DIFF WBC: CPT

## 2020-09-07 PROCEDURE — 11000001 HC ACUTE MED/SURG PRIVATE ROOM

## 2020-09-07 PROCEDURE — 80053 COMPREHEN METABOLIC PANEL: CPT

## 2020-09-07 RX ORDER — INSULIN ASPART 100 [IU]/ML
12 INJECTION, SOLUTION INTRAVENOUS; SUBCUTANEOUS
Status: DISCONTINUED | OUTPATIENT
Start: 2020-09-08 | End: 2020-09-10

## 2020-09-07 RX ADMIN — ATORVASTATIN CALCIUM 80 MG: 20 TABLET, FILM COATED ORAL at 08:09

## 2020-09-07 RX ADMIN — INSULIN ASPART 4 UNITS: 100 INJECTION, SOLUTION INTRAVENOUS; SUBCUTANEOUS at 11:09

## 2020-09-07 RX ADMIN — CARVEDILOL 12.5 MG: 12.5 TABLET, FILM COATED ORAL at 08:09

## 2020-09-07 RX ADMIN — INSULIN ASPART 9 UNITS: 100 INJECTION, SOLUTION INTRAVENOUS; SUBCUTANEOUS at 11:09

## 2020-09-07 RX ADMIN — MELATONIN TAB 3 MG 6 MG: 3 TAB at 08:09

## 2020-09-07 RX ADMIN — INSULIN ASPART 9 UNITS: 100 INJECTION, SOLUTION INTRAVENOUS; SUBCUTANEOUS at 04:09

## 2020-09-07 RX ADMIN — INSULIN ASPART 2 UNITS: 100 INJECTION, SOLUTION INTRAVENOUS; SUBCUTANEOUS at 04:09

## 2020-09-07 RX ADMIN — INSULIN ASPART 4 UNITS: 100 INJECTION, SOLUTION INTRAVENOUS; SUBCUTANEOUS at 07:09

## 2020-09-07 RX ADMIN — INSULIN DETEMIR 48 UNITS: 100 INJECTION, SOLUTION SUBCUTANEOUS at 08:09

## 2020-09-07 RX ADMIN — INSULIN ASPART 9 UNITS: 100 INJECTION, SOLUTION INTRAVENOUS; SUBCUTANEOUS at 07:09

## 2020-09-07 RX ADMIN — LISINOPRIL 40 MG: 20 TABLET ORAL at 08:09

## 2020-09-07 RX ADMIN — CEFTRIAXONE SODIUM 2 G: 2 INJECTION, SOLUTION INTRAVENOUS at 06:09

## 2020-09-07 RX ADMIN — INSULIN ASPART 3 UNITS: 100 INJECTION, SOLUTION INTRAVENOUS; SUBCUTANEOUS at 08:09

## 2020-09-07 RX ADMIN — ENOXAPARIN SODIUM 40 MG: 40 INJECTION SUBCUTANEOUS at 08:09

## 2020-09-07 RX ADMIN — ASPIRIN 81 MG: 81 TABLET, COATED ORAL at 08:09

## 2020-09-07 NOTE — PROGRESS NOTES
[facility] - ICU 14 [facility] Medicine  Telemedicine Progress Note    Patient Name: Billy Sheffield Miguel  MRN: [id number]  Patient Class: IP- Inpatient   Admission Date: 9/1/2020  Length of Stay: 6 days  Attending Physician: Gisella Cheng MD  Primary Care Provider: BRAD Baker MD    [facility] Medicine Team[place] [facility] [place] Gisella Cheng MD  Virtual Telemedicine Progress Note  Start time: 1306  Chief complaint: Acute osteomyelitis of left foot  The patient location is: [id number]  The patient arrived at: 9/1/2020  3:22 PM  Present with the patient at the time of the telemed/virtual assessment: telepresenter   End time:  1315  Total time spent with patient: 9 min  I have assessed findings virtually using a telemedicine platform and with assistance of the bedside nurse or telemedicine presenter.  The attending portion of this evaluation, treatment, and documentation was performed per Gisella Cheng MD via audiovisual.    Patient was transferred to the telemedicine service on: 9/6/2020    Subjective:     Admission CC:   Chief Complaint   Patient presents with    Leg Pain     sent from podiatry for admission , infected L foot     Follow up visit for: Acute osteomyelitis of left foot    Interval History / Events Overnight:   The patient is able to provide adequate history. Additional history was obtained from past medical records. No significant events reported by Nursing. Stable on RA.  Patient complains of nothing specific. Symptoms have improved since yesterday. Associated symptoms include: fatigue. Symptoms are decreasing in severity. Alleviating factors include: rest.     Data reviewed 9/7/2020: Lab test(s) reviewed: H&H stable. Hyperglycemia  I have assessed findings virtually using a telemedicine platform and with assistance of the bedside nurse or telemedicine presenter.      Review of Systems   Constitutional: Negative for fever.   Respiratory: Negative for shortness of breath.       Objective:     Vital Signs (Most Recent):  Temp: 98.3 °F (36.8 °C) (09/07/20 1628)  Pulse: (!) 53 (09/07/20 1628)  Resp: 18 (09/07/20 1628)  BP: (!) 140/85 (09/07/20 1628)  SpO2: 97 % (09/07/20 1628) Vital Signs (24h Range):  Temp:  [98 °F (36.7 °C)-98.6 °F (37 °C)] 98.3 °F (36.8 °C)  Pulse:  [53-65] 53  Resp:  [15-18] 18  SpO2:  [96 %-98 %] 97 %  BP: (125-146)/(77-85) 140/85     Weight: 120.2 kg (265 lb)  Body mass index is 34.01 kg/m².    Intake/Output Summary (Last 24 hours) at 9/7/2020 1658  Last data filed at 9/6/2020 1730  Gross per 24 hour   Intake 1550 ml   Output --   Net 1550 ml      Physical Exam  Constitutional:       General: He is not in acute distress.     Appearance: Normal appearance. He is not diaphoretic.   Eyes:      General: Lids are normal. No scleral icterus.        Right eye: No discharge.         Left eye: No discharge.      Conjunctiva/sclera: Conjunctivae normal.   Neck:      Trachea: Phonation normal.   Cardiovascular:      Rate and Rhythm: Bradycardia present.   Pulmonary:      Effort: Pulmonary effort is normal. No tachypnea, accessory muscle usage or respiratory distress.   Abdominal:      General: There is no distension.   Neurological:      Mental Status: He is alert and oriented to person, place, and time. He is not disoriented.   Psychiatric:         Attention and Perception: Attention normal.         Mood and Affect: Affect normal.         Speech: Speech normal.         Behavior: Behavior is cooperative.         Significant Labs:   Recent Labs   Lab 09/05/20  0337 09/06/20  0636 09/07/20  0529   WBC 6.03 7.78 6.40   HGB 11.6* 12.5* 11.8*   HCT 35.3* 37.9* 35.6*    318 296     Recent Labs   Lab 09/05/20  0337 09/06/20  0636 09/07/20  0529   GRAN 61.2  3.7 69.4  5.4 59.4  3.8   LYMPH 22.7  1.4 17.4*  1.4 23.0  1.5   MONO 12.4  0.8 10.8  0.8 13.8  0.9   EOS 0.2 0.1 0.2     Recent Labs   Lab 09/05/20  0337 09/06/20  0636 09/07/20  0529   * 135* 134*   K 4.3  3.9 4.3    103 102   CO2 24 24 26   BUN 13 9 10   CREATININE 0.9 0.8 0.8   * 220* 254*   CALCIUM 8.2* 8.9 8.6*   ALBUMIN 2.3* 2.6* 2.6*   MG  --  1.6 1.8   PHOS  --  3.3 3.6     Recent Labs   Lab 09/01/20  1546  09/04/20  0357 09/05/20  0337 09/06/20  0636 09/07/20  0529   ALKPHOS  --    < > 97 92 98 90   ALT  --    < > 9* 19 22 22   AST  --    < > 11 21 18 17   PROT  --    < > 6.6 5.9* 7.0 6.7   BILITOT  --    < > 0.3 0.3 0.5 0.4   INR 1.0  --  1.0  --   --   --    .9*  --   --   --  67.5*  --     < > = values in this interval not displayed.     Recent Labs   Lab 07/03/20  1530 07/03/20 2019 07/14/20  1006 09/01/20  1546 09/02/20  0234 09/06/20  0636   PROCAL  --  0.17  --   --   --   --   --    LACTATE 1.2 1.3  --   --   --   --   --    FERRITIN  --   --   --   --   --  470*  --    SEDRATE  --   --    < > 45* 47*  --  91*    < > = values in this interval not displayed.     SARS-CoV-2 RNA, Amplification, Qual (no units)   Date Value   09/01/2020 Positive (A)   07/03/2020 Negative     Recent Labs   Lab 04/14/20  1555 07/03/20 2020 08/12/20  0837   HGBA1C 13.1* 11.8* 11.5*      Recent Labs   Lab 09/06/20 2053 09/07/20  0706 09/07/20  1113   POCTGLUCOSE 296* 244* 243*     Recent Labs   Lab 08/12/20  0837   TSH 1.554       Significant Imaging:     Assessment/Plan:      Active Diagnoses:    Diagnosis Date Noted POA    PRINCIPAL PROBLEM:  Acute osteomyelitis of left foot [M86.172] 09/02/2020 Yes    Coronavirus infection [B34.2]  Yes    Diabetic foot infection [E11.628, L08.9] 09/01/2020 Yes    COVID-19 virus detected [U07.1] 09/01/2020 Yes    PVD (peripheral vascular disease) [I73.9] 06/15/2015 Yes    Uncontrolled type II diabetes mellitus [E11.65] 06/15/2015 Yes    Coronary artery disease involving native coronary artery of native heart without angina pectoris [I25.10] 06/25/2013 Yes    Dyslipidemia [E78.5] 05/22/2013 Yes    Essential hypertension [I10] 09/27/2012 Yes      Problems  Resolved During this Admission:       Overview / ICU Course:    Billy Rivera is a 55 y.o. male admitted for Acute osteomyelitis of left foot.    Inpatient Medications Prescribed for Management of Current Problems:     Scheduled Meds:    aspirin  81 mg Oral Daily    atorvastatin  80 mg Oral Daily    carvediloL  12.5 mg Oral BID    cefTRIAXone (ROCEPHIN) IVPB  2 g Intravenous Q24H    enoxaparin  40 mg Subcutaneous Q24H    insulin aspart U-100  9 Units Subcutaneous TIDWM    insulin detemir U-100  48 Units Subcutaneous QHS    lisinopriL  40 mg Oral Daily     Continuous Infusions:   As Needed: acetaminophen, dextrose 50%, dextrose 50%, glucagon (human recombinant), glucose, glucose, insulin aspart U-100, melatonin, ondansetron, promethazine (PHENERGAN) IVPB, sodium chloride 0.9%    Assessment and Plan by Problem    Diabetic Foot Infection_Left Foot Osteomyelitis  -polymicrobial wound culture - pan-sensitive, Klebsiella and Strept group B  -continue ceftriaxone  -Podiatry_Vascular Surgery_infectious disease consults following.   -s/p left partial 1st ray amputation 09/04 for osteomyelitis  -post-op culture and biopsy results pending; ID following     Viral Pneumonia due to COVID-19  - COVID-19 testing:   - Isolation: Airborne/Droplet. Surgical mask on patient. Notify Infection Control  - Diagnostics: Trend Q48hrs if stable, more frequently if patient decompensating     Mostly asymptomatic      Diabetes mellitus with insulin therapy  - Hgb A1C 11.5 8/2020  - Home medication: Glargine 55 U HS and metformin 1 g BID, humulin 20 TID WM  - basal and bolus insulin doses increased 9/6  - Aspart increased for 9/8     Dyslipidemia associated with type 2 diabetes mellitus  - Continue home medication Atorvastatin     PVD (peripheral vascular disease)  - Continue home medications ASA and atorvastatin     Coronary artery disease involving native coronary artery of native heart without angina pectoris  -No acute  issues  -Continue home ASA, statin     HTN (hypertension)  -Continue home meds: carvedilol 12.5 and lisinopril 40mg       Diet: Diet diabetic [facility]; 2000 Calorie; Double Portions; Isolation Tray - Styrofoam  GI Prophylaxis: Not indicated  Significant LDAs:   IV Access Type: Peripheral  Urinary Catheter Indication if present: Patient Does Not Have Urinary Catheter  Other Lines/Tubes/Drains:    Goals of Care:    Previous admission:  7/3/20  Likely prognosis:  Fair  Code Status: Full Code  Comfort Only: No  Hospice: No  Goals at discharge: remain at home, with physician follow-up    Discharge Planning   DARVIN: 9/8/2020     Code Status: Full Code   Is the patient medically ready for discharge?: No    Reason for patient still in hospital (select all that apply): Laboratory test and Consult recommendations  Discharge Plan A: Home Health, Home with family        VTE Risk Mitigation (From admission, onward)         Ordered     enoxaparin injection 40 mg  Every 24 hours      09/01/20 1810     IP VTE HIGH RISK PATIENT  Once      09/01/20 1810                   Gisella Cheng MD  Department of Hospital Medicine   [facility] - ICU 14 [facility]

## 2020-09-07 NOTE — PLAN OF CARE
Plan of care reviewed with pt regarding treatment plan and expected outcomes. No questions at this time. Pt progressing as expected. Will continue to monitor and enforce plan of care.

## 2020-09-08 LAB
ALBUMIN SERPL BCP-MCNC: 2.6 G/DL (ref 3.5–5.2)
ALP SERPL-CCNC: 90 U/L (ref 55–135)
ALT SERPL W/O P-5'-P-CCNC: 19 U/L (ref 10–44)
ANION GAP SERPL CALC-SCNC: 9 MMOL/L (ref 8–16)
AST SERPL-CCNC: 16 U/L (ref 10–40)
BACTERIA SPEC AEROBE CULT: ABNORMAL
BASOPHILS # BLD AUTO: 0.04 K/UL (ref 0–0.2)
BASOPHILS NFR BLD: 0.6 % (ref 0–1.9)
BILIRUB SERPL-MCNC: 0.3 MG/DL (ref 0.1–1)
BUN SERPL-MCNC: 14 MG/DL (ref 6–20)
CALCIUM SERPL-MCNC: 8.7 MG/DL (ref 8.7–10.5)
CHLORIDE SERPL-SCNC: 101 MMOL/L (ref 95–110)
CO2 SERPL-SCNC: 24 MMOL/L (ref 23–29)
CREAT SERPL-MCNC: 0.8 MG/DL (ref 0.5–1.4)
DIFFERENTIAL METHOD: ABNORMAL
EOSINOPHIL # BLD AUTO: 0.2 K/UL (ref 0–0.5)
EOSINOPHIL NFR BLD: 3.1 % (ref 0–8)
ERYTHROCYTE [DISTWIDTH] IN BLOOD BY AUTOMATED COUNT: 13.2 % (ref 11.5–14.5)
EST. GFR  (AFRICAN AMERICAN): >60 ML/MIN/1.73 M^2
EST. GFR  (NON AFRICAN AMERICAN): >60 ML/MIN/1.73 M^2
GLUCOSE SERPL-MCNC: 241 MG/DL (ref 70–110)
HCT VFR BLD AUTO: 35 % (ref 40–54)
HGB BLD-MCNC: 11.7 G/DL (ref 14–18)
IMM GRANULOCYTES # BLD AUTO: 0.06 K/UL (ref 0–0.04)
IMM GRANULOCYTES NFR BLD AUTO: 0.8 % (ref 0–0.5)
LYMPHOCYTES # BLD AUTO: 1.7 K/UL (ref 1–4.8)
LYMPHOCYTES NFR BLD: 23.1 % (ref 18–48)
MAGNESIUM SERPL-MCNC: 1.7 MG/DL (ref 1.6–2.6)
MCH RBC QN AUTO: 28.7 PG (ref 27–31)
MCHC RBC AUTO-ENTMCNC: 33.4 G/DL (ref 32–36)
MCV RBC AUTO: 86 FL (ref 82–98)
MONOCYTES # BLD AUTO: 0.8 K/UL (ref 0.3–1)
MONOCYTES NFR BLD: 11.4 % (ref 4–15)
NEUTROPHILS # BLD AUTO: 4.4 K/UL (ref 1.8–7.7)
NEUTROPHILS NFR BLD: 61 % (ref 38–73)
NRBC BLD-RTO: 0 /100 WBC
PHOSPHATE SERPL-MCNC: 4.1 MG/DL (ref 2.7–4.5)
PLATELET # BLD AUTO: 312 K/UL (ref 150–350)
PMV BLD AUTO: 9.3 FL (ref 9.2–12.9)
POCT GLUCOSE: 202 MG/DL (ref 70–110)
POCT GLUCOSE: 215 MG/DL (ref 70–110)
POCT GLUCOSE: 221 MG/DL (ref 70–110)
POCT GLUCOSE: 250 MG/DL (ref 70–110)
POTASSIUM SERPL-SCNC: 4.4 MMOL/L (ref 3.5–5.1)
PROT SERPL-MCNC: 6.7 G/DL (ref 6–8.4)
RBC # BLD AUTO: 4.07 M/UL (ref 4.6–6.2)
SODIUM SERPL-SCNC: 134 MMOL/L (ref 136–145)
WBC # BLD AUTO: 7.18 K/UL (ref 3.9–12.7)

## 2020-09-08 PROCEDURE — 63600175 PHARM REV CODE 636 W HCPCS: Performed by: INTERNAL MEDICINE

## 2020-09-08 PROCEDURE — 11000001 HC ACUTE MED/SURG PRIVATE ROOM

## 2020-09-08 PROCEDURE — 99233 PR SUBSEQUENT HOSPITAL CARE,LEVL III: ICD-10-PCS | Mod: ,,, | Performed by: INTERNAL MEDICINE

## 2020-09-08 PROCEDURE — 99232 SBSQ HOSP IP/OBS MODERATE 35: CPT | Mod: ,,, | Performed by: INTERNAL MEDICINE

## 2020-09-08 PROCEDURE — 63600175 PHARM REV CODE 636 W HCPCS: Performed by: HOSPITALIST

## 2020-09-08 PROCEDURE — 25000003 PHARM REV CODE 250: Performed by: INTERNAL MEDICINE

## 2020-09-08 PROCEDURE — 25000003 PHARM REV CODE 250: Performed by: HOSPITALIST

## 2020-09-08 PROCEDURE — 84100 ASSAY OF PHOSPHORUS: CPT

## 2020-09-08 PROCEDURE — 99233 SBSQ HOSP IP/OBS HIGH 50: CPT | Mod: ,,, | Performed by: INTERNAL MEDICINE

## 2020-09-08 PROCEDURE — C9399 UNCLASSIFIED DRUGS OR BIOLOG: HCPCS | Performed by: INTERNAL MEDICINE

## 2020-09-08 PROCEDURE — 36415 COLL VENOUS BLD VENIPUNCTURE: CPT

## 2020-09-08 PROCEDURE — 80053 COMPREHEN METABOLIC PANEL: CPT

## 2020-09-08 PROCEDURE — 85025 COMPLETE CBC W/AUTO DIFF WBC: CPT

## 2020-09-08 PROCEDURE — 83735 ASSAY OF MAGNESIUM: CPT

## 2020-09-08 PROCEDURE — 99232 PR SUBSEQUENT HOSPITAL CARE,LEVL II: ICD-10-PCS | Mod: ,,, | Performed by: INTERNAL MEDICINE

## 2020-09-08 PROCEDURE — 94761 N-INVAS EAR/PLS OXIMETRY MLT: CPT

## 2020-09-08 RX ADMIN — INSULIN ASPART 2 UNITS: 100 INJECTION, SOLUTION INTRAVENOUS; SUBCUTANEOUS at 09:09

## 2020-09-08 RX ADMIN — INSULIN ASPART 12 UNITS: 100 INJECTION, SOLUTION INTRAVENOUS; SUBCUTANEOUS at 12:09

## 2020-09-08 RX ADMIN — INSULIN DETEMIR 48 UNITS: 100 INJECTION, SOLUTION SUBCUTANEOUS at 09:09

## 2020-09-08 RX ADMIN — INSULIN ASPART 4 UNITS: 100 INJECTION, SOLUTION INTRAVENOUS; SUBCUTANEOUS at 12:09

## 2020-09-08 RX ADMIN — ENOXAPARIN SODIUM 40 MG: 40 INJECTION SUBCUTANEOUS at 09:09

## 2020-09-08 RX ADMIN — INSULIN ASPART 12 UNITS: 100 INJECTION, SOLUTION INTRAVENOUS; SUBCUTANEOUS at 08:09

## 2020-09-08 RX ADMIN — CARVEDILOL 12.5 MG: 12.5 TABLET, FILM COATED ORAL at 09:09

## 2020-09-08 RX ADMIN — INSULIN ASPART 4 UNITS: 100 INJECTION, SOLUTION INTRAVENOUS; SUBCUTANEOUS at 08:09

## 2020-09-08 RX ADMIN — CEFTRIAXONE SODIUM 2 G: 2 INJECTION, SOLUTION INTRAVENOUS at 05:09

## 2020-09-08 RX ADMIN — LISINOPRIL 40 MG: 20 TABLET ORAL at 08:09

## 2020-09-08 RX ADMIN — ASPIRIN 81 MG: 81 TABLET, COATED ORAL at 08:09

## 2020-09-08 RX ADMIN — ATORVASTATIN CALCIUM 80 MG: 20 TABLET, FILM COATED ORAL at 08:09

## 2020-09-08 RX ADMIN — INSULIN ASPART 4 UNITS: 100 INJECTION, SOLUTION INTRAVENOUS; SUBCUTANEOUS at 05:09

## 2020-09-08 RX ADMIN — CARVEDILOL 12.5 MG: 12.5 TABLET, FILM COATED ORAL at 08:09

## 2020-09-08 RX ADMIN — INSULIN ASPART 12 UNITS: 100 INJECTION, SOLUTION INTRAVENOUS; SUBCUTANEOUS at 05:09

## 2020-09-08 NOTE — PLAN OF CARE
PLAN IS TOO DISCHARGE PT HOME WITH HOME HEALTH   09/08/20 0513   Discharge Reassessment   Assessment Type Discharge Planning Reassessment   Provided patient/caregiver education on the expected discharge date and the discharge plan No   Do you have any problems affording any of your prescribed medications? No   Discharge Plan A Home;Home with family;Home Health   Discharge Plan B Home;Home with family;Home Health

## 2020-09-08 NOTE — PLAN OF CARE
Problem: Fall Injury Risk  Goal: Absence of Fall and Fall-Related Injury  Intervention: Identify and Manage Contributors to Fall Injury Risk  Flowsheets (Taken 9/8/2020 0110)  Self-Care Promotion: independence encouraged  Medication Review/Management: medications reviewed     Problem: Adult Inpatient Plan of Care  Goal: Absence of Hospital-Acquired Illness or Injury  Intervention: Identify and Manage Fall Risk  Flowsheets (Taken 9/8/2020 0110)  Safety Promotion/Fall Prevention: assistive device/personal item within reach  Intervention: Prevent VTE (venous thromboembolism)  Flowsheets (Taken 9/8/2020 0110)  VTE Prevention/Management: bleeding precautions maintained     Pt in bed, easily aroused via verbal stimuli.  No acute distress noted.  Afebrile.  No SOB noted at this time.  Spo2 ranging in the mid to high 90s on room air.  Assisted pt with bedtime care.  Bed to lowest limit, call light in hand, pt continues to refuse tele monitor.  No complaints voiced at this time.

## 2020-09-08 NOTE — SUBJECTIVE & OBJECTIVE
"Interval History: NAEON. Patient reports still feeling "great". Strikethrough of the plantar aspect of the bandages, patient reports he has been doing some walking in his room but tries to put most of his weight on his heel and always uses the postop shoe. Denies N/V/F/C, foot pain. Vitals stable other than transient bradycardia. WBCs WNL, last ESR was increasing and last CRP was decreasing, no ESR/CRP within past 2 days. Clean margin from surgery positive for diphtheroids.     Review of Systems   Constitutional: Negative for chills and fever.   HENT: Negative for congestion, ear pain, rhinorrhea and sneezing.    Eyes: Negative for pain.   Respiratory: Positive for cough. Negative for shortness of breath.    Cardiovascular: Positive for leg swelling. Negative for chest pain.   Gastrointestinal: Negative for abdominal pain, constipation, diarrhea, nausea and vomiting (chronic).   Endocrine: Negative for polyuria.   Genitourinary: Negative for decreased urine volume, difficulty urinating, dysuria and flank pain.   Musculoskeletal: Negative for back pain, neck pain and neck stiffness.   Skin: Positive for wound. Negative for rash.   Neurological: Positive for numbness. Negative for weakness and headaches.   Psychiatric/Behavioral: Negative for confusion. The patient is not nervous/anxious.      Objective:     Vital Signs (Most Recent):  Temp: 97.5 °F (36.4 °C) (09/08/20 0838)  Pulse: 60 (09/08/20 0838)  Resp: 16 (09/08/20 0838)  BP: 139/84 (09/08/20 0838)  SpO2: 97 % (09/08/20 0838) Vital Signs (24h Range):  Temp:  [97.5 °F (36.4 °C)-98.3 °F (36.8 °C)] 97.5 °F (36.4 °C)  Pulse:  [53-60] 60  Resp:  [16-18] 16  SpO2:  [97 %-100 %] 97 %  BP: (139-140)/(84-85) 139/84     Weight: 120.2 kg (265 lb)  Body mass index is 34.01 kg/m².    Estimated Creatinine Clearance: 142 mL/min (based on SCr of 0.8 mg/dL).    Physical Exam  Vitals signs reviewed.   Constitutional:       Appearance: Normal appearance. He is normal weight. "   HENT:      Head: Normocephalic and atraumatic.   Eyes:      General:         Right eye: No discharge.         Left eye: No discharge.      Extraocular Movements: Extraocular movements intact.      Conjunctiva/sclera: Conjunctivae normal.   Pulmonary:      Effort: Pulmonary effort is normal. No respiratory distress.   Chest:      Chest wall: No tenderness.   Abdominal:      Tenderness: There is no abdominal tenderness. There is no guarding.   Musculoskeletal:         General: No tenderness.      Comments: Left 1st ray amputation   Skin:     General: Skin is warm and dry.      Comments: Post-op dressing wrapped and dry   Neurological:      Mental Status: He is alert and oriented to person, place, and time. Mental status is at baseline.   Psychiatric:         Mood and Affect: Mood normal.         Behavior: Behavior normal.         Thought Content: Thought content normal.         Judgment: Judgment normal.         Significant Labs:   Blood Culture:   Recent Labs   Lab 07/03/20  1450 07/03/20  1541 07/03/20  2020 09/01/20  2211 09/01/20  2306   LABBLOO No growth after 5 days. No growth after 5 days. No growth after 5 days.  No growth after 5 days. No growth after 5 days. No growth after 5 days.     CBC:   Recent Labs   Lab 09/07/20  0529 09/08/20  0428   WBC 6.40 7.18   HGB 11.8* 11.7*   HCT 35.6* 35.0*    312     CMP:   Recent Labs   Lab 09/07/20  0529 09/08/20  0428   * 134*   K 4.3 4.4    101   CO2 26 24   * 241*   BUN 10 14   CREATININE 0.8 0.8   CALCIUM 8.6* 8.7   PROT 6.7 6.7   ALBUMIN 2.6* 2.6*   BILITOT 0.4 0.3   ALKPHOS 90 90   AST 17 16   ALT 22 19   ANIONGAP 6* 9   EGFRNONAA >60.0 >60.0     Lactic Acid: No results for input(s): LACTATE in the last 48 hours.  Microbiology Results (last 7 days)     Procedure Component Value Units Date/Time    Aerobic culture [[id number]]  (Abnormal) Collected: 09/04/20 2011    Order Status: Completed Specimen: Bone from Foot, Left Updated: 09/08/20  1004     Aerobic Bacterial Culture DIPHTHEROIDS  Rare      Narrative:      1. Left 1st Metatarsal Bone Clean Margin    Aerobic culture [[id number]]  (Abnormal) Collected: 09/04/20 2011    Order Status: Completed Specimen: Bone from Foot, Left Updated: 09/07/20 1413     Aerobic Bacterial Culture STREPTOCOCCUS AGALACTIAE (GROUP B)  Few  Beta-hemolytic streptococci are routinely susceptible to   penicillins,cephalosporins and carbapenems.  Susceptibility testing not routinely performed      Narrative:      1. Left 1st Metatarsal Bone    AFB Culture & Smear [[id number]] Collected: 09/04/20 2010    Order Status: Completed Specimen: Foot, Left Updated: 09/07/20 1313     AFB Culture & Smear Culture in progress     AFB CULTURE STAIN No acid fast bacilli seen.    Narrative:      1. Left 1st Metatarsal Bone    AFB Culture & Smear [[id number]] Collected: 09/04/20 2010    Order Status: Completed Specimen: Foot, Left Updated: 09/07/20 1313     AFB Culture & Smear Culture in progress     AFB CULTURE STAIN No acid fast bacilli seen.    Narrative:      1. Left 1st Metatarsal Bone Clean Margin    Blood culture [[id number]] Collected: 09/01/20 2306    Order Status: Completed Specimen: Blood Updated: 09/07/20 0612     Blood Culture, Routine No growth after 5 days.    Blood culture [[id number]] Collected: 09/01/20 2211    Order Status: Completed Specimen: Blood from Peripheral, Antecubital, Right Updated: 09/06/20 2312     Blood Culture, Routine No growth after 5 days.    Culture, Anaerobe [[id number]] Collected: 09/04/20 2010    Order Status: Completed Specimen: Foot, Left Updated: 09/06/20 1203     Anaerobic Culture Culture in progress    Narrative:      1. Left 1st Metatarsal Bone    Culture, Anaerobe [[id number]] Collected: 09/04/20 2010    Order Status: Completed Specimen: Foot, Left Updated: 09/06/20 1203     Anaerobic Culture Culture in progress    Narrative:      1. Left 1st Metatarsal Bone Clean Margin    Gram stain [[id number]]  Collected: 09/04/20 2010    Order Status: Completed Specimen: Foot, Left Updated: 09/04/20 2141     Gram Stain Result No WBC's      No organisms seen    Narrative:      1. Left 1st Metatarsal Bone Clean Margin    Gram stain [[id number]] Collected: 09/04/20 2010    Order Status: Completed Specimen: Foot, Left Updated: 09/04/20 2138     Gram Stain Result Rare WBC's      No organisms seen    Narrative:      1. Left 1st Metatarsal Bone    Fungus culture [[id number]] Collected: 09/04/20 2010    Order Status: Sent Specimen: Foot, Left Updated: 09/04/20 2051    Fungus culture [[id number]] Collected: 09/04/20 2010    Order Status: Sent Specimen: Foot, Left Updated: 09/04/20 2049    Aerobic culture (Specify Source) **CANNOT BE ORDERED AS STAT* [[id number]]  (Abnormal)  (Susceptibility) Collected: 09/01/20 1731    Order Status: Completed Specimen: Incision site from Toe, Left Foot Updated: 09/03/20 1306     Aerobic Bacterial Culture KLEBSIELLA PNEUMONIAE  Rare        STREPTOCOCCUS AGALACTIAE (GROUP B)  Few  Beta-hemolytic streptococci are routinely susceptible to   penicillins,cephalosporins and carbapenems.  Susceptibility testing not routinely performed            Procalcitonin: No results for input(s): PROCAL in the last 48 hours.  Urine Culture: No results for input(s): LABURIN in the last 4320 hours.  Wound Culture:   Recent Labs   Lab 07/04/20  1212 07/14/20  0830 07/28/20  1617 09/01/20  1731 09/04/20 2011   LABAERO No growth  No growth No growth STREPTOCOCCUS AGALACTIAE (GROUP B)  Rare  Beta-hemolytic streptococci are routinely susceptible to   penicillins,cephalosporins and carbapenems.  * KLEBSIELLA PNEUMONIAE  Rare  *  STREPTOCOCCUS AGALACTIAE (GROUP B)  Few  Beta-hemolytic streptococci are routinely susceptible to   penicillins,cephalosporins and carbapenems.  Susceptibility testing not routinely performed  * DIPHTHEROIDS  Rare  *  STREPTOCOCCUS AGALACTIAE (GROUP B)  Few  Beta-hemolytic streptococci are routinely  susceptible to   penicillins,cephalosporins and carbapenems.  Susceptibility testing not routinely performed  *       Significant Imaging: None

## 2020-09-08 NOTE — ASSESSMENT & PLAN NOTE
A: s/p left 1st ray amputation 9/4 for osteomyelitis diagnosed based on MRI and overlying wounds. Clean margin positive for diphtheroids which may be contaminant, must followup on path results. Vitals stable other than transient bradycardia. WBCs WNL, last ESR was increasing and last CRP was decreasing, no ESR/CRP within past 2 days.    P:   - S/p partial 1st ray amputation  - Continue ceftriaxone 2g [id number].   - Followup on path results of clean margin from surgery.   - Will continue to follow.

## 2020-09-08 NOTE — PROGRESS NOTES
[facility] - ICU 14 WT  Infectious Disease  Progress Note    Patient Name: Billy Sheffield Miguel  MRN: [id number]  Admission Date: 9/1/2020  Length of Stay: 7 days  Attending Physician: Gisella Cheng MD  Primary Care Provider: BRDA Baker MD    Isolation Status: Airborne and Contact and Droplet  Assessment/Plan:      Diabetic foot infection  A: s/p left 1st ray amputation 9/4 for osteomyelitis diagnosed based on MRI and overlying wounds. Clean margin positive for diphtheroids which may be contaminant, must followup on path results. Vitals stable other than transient bradycardia. WBCs WNL, last ESR was increasing and last CRP was decreasing, no ESR/CRP within past 2 days.    P:   - S/p partial 1st ray amputation  - Continue ceftriaxone 2g [id number].   - Followup on path results of clean margin from surgery.   - Will continue to follow.         Thank you for your consult. I will follow-up with patient. Please contact us if you have any additional questions.    Josiah Saunders MD  Infectious Disease  [facility] - ICU 14 WT    Subjective:     Principal Problem:Acute osteomyelitis of left foot    HPI: 54 yo M with Hx of DM2, osteomyelitis of L 1st digit (s/p amputation 07/04), PVD, CAD (past MI), HTN, HLD sent by podiatry clinic due to suspected infection at amputation site. Patient has had delayed healing of the surgical wound despite multiple courses of PO antibiotics. Cultures from 07/04 grew Prevotella for which he was prescribed doxycycline and metronidazole. The wound ultimately dehisced and repeat cultures from 07/28 grew Strep agalactiae for which he was prescribed clindamycin. Yesterday at followup podiatry appointment worsening purulence and erythema were noted, patient was sent to ED. Patient denies pain at the amputation site, though he admits to diabetic neuropathy. He tested positive for COVID-19 with preadmission screen, only symptom is dry cough of 1 month duration.   Interval  "History: NAEON. Patient reports still feeling "great". Strikethrough of the plantar aspect of the bandages, patient reports he has been doing some walking in his room but tries to put most of his weight on his heel and always uses the postop shoe. Denies N/V/F/C, foot pain. Vitals stable other than transient bradycardia. WBCs WNL, last ESR was increasing and last CRP was decreasing, no ESR/CRP within past 2 days. Clean margin from surgery positive for diphtheroids.     Review of Systems   Constitutional: Negative for chills and fever.   HENT: Negative for congestion, ear pain, rhinorrhea and sneezing.    Eyes: Negative for pain.   Respiratory: Positive for cough. Negative for shortness of breath.    Cardiovascular: Positive for leg swelling. Negative for chest pain.   Gastrointestinal: Negative for abdominal pain, constipation, diarrhea, nausea and vomiting (chronic).   Endocrine: Negative for polyuria.   Genitourinary: Negative for decreased urine volume, difficulty urinating, dysuria and flank pain.   Musculoskeletal: Negative for back pain, neck pain and neck stiffness.   Skin: Positive for wound. Negative for rash.   Neurological: Positive for numbness. Negative for weakness and headaches.   Psychiatric/Behavioral: Negative for confusion. The patient is not nervous/anxious.      Objective:     Vital Signs (Most Recent):  Temp: 97.5 °F (36.4 °C) (09/08/20 0838)  Pulse: 60 (09/08/20 0838)  Resp: 16 (09/08/20 0838)  BP: 139/84 (09/08/20 0838)  SpO2: 97 % (09/08/20 0838) Vital Signs (24h Range):  Temp:  [97.5 °F (36.4 °C)-98.3 °F (36.8 °C)] 97.5 °F (36.4 °C)  Pulse:  [53-60] 60  Resp:  [16-18] 16  SpO2:  [97 %-100 %] 97 %  BP: (139-140)/(84-85) 139/84     Weight: 120.2 kg (265 lb)  Body mass index is 34.01 kg/m².    Estimated Creatinine Clearance: 142 mL/min (based on SCr of 0.8 mg/dL).    Physical Exam  Vitals signs reviewed.   Constitutional:       Appearance: Normal appearance. He is normal weight.   HENT:      " Head: Normocephalic and atraumatic.   Eyes:      General:         Right eye: No discharge.         Left eye: No discharge.      Extraocular Movements: Extraocular movements intact.      Conjunctiva/sclera: Conjunctivae normal.   Pulmonary:      Effort: Pulmonary effort is normal. No respiratory distress.   Chest:      Chest wall: No tenderness.   Abdominal:      Tenderness: There is no abdominal tenderness. There is no guarding.   Musculoskeletal:         General: No tenderness.      Comments: Left 1st ray amputation   Skin:     General: Skin is warm and dry.      Comments: Post-op dressing wrapped and dry   Neurological:      Mental Status: He is alert and oriented to person, place, and time. Mental status is at baseline.   Psychiatric:         Mood and Affect: Mood normal.         Behavior: Behavior normal.         Thought Content: Thought content normal.         Judgment: Judgment normal.         Significant Labs:   Blood Culture:   Recent Labs   Lab 07/03/20  1450 07/03/20  1541 07/03/20  2020 09/01/20  2211 09/01/20  2306   LABBLOO No growth after 5 days. No growth after 5 days. No growth after 5 days.  No growth after 5 days. No growth after 5 days. No growth after 5 days.     CBC:   Recent Labs   Lab 09/07/20  0529 09/08/20  0428   WBC 6.40 7.18   HGB 11.8* 11.7*   HCT 35.6* 35.0*    312     CMP:   Recent Labs   Lab 09/07/20  0529 09/08/20  0428   * 134*   K 4.3 4.4    101   CO2 26 24   * 241*   BUN 10 14   CREATININE 0.8 0.8   CALCIUM 8.6* 8.7   PROT 6.7 6.7   ALBUMIN 2.6* 2.6*   BILITOT 0.4 0.3   ALKPHOS 90 90   AST 17 16   ALT 22 19   ANIONGAP 6* 9   EGFRNONAA >60.0 >60.0     Lactic Acid: No results for input(s): LACTATE in the last 48 hours.  Microbiology Results (last 7 days)     Procedure Component Value Units Date/Time    Aerobic culture [[id number]]  (Abnormal) Collected: 09/04/20 2011    Order Status: Completed Specimen: Bone from Foot, Left Updated: 09/08/20 1004      Aerobic Bacterial Culture DIPHTHEROIDS  Rare      Narrative:      1. Left 1st Metatarsal Bone Clean Margin    Aerobic culture [[id number]]  (Abnormal) Collected: 09/04/20 2011    Order Status: Completed Specimen: Bone from Foot, Left Updated: 09/07/20 1413     Aerobic Bacterial Culture STREPTOCOCCUS AGALACTIAE (GROUP B)  Few  Beta-hemolytic streptococci are routinely susceptible to   penicillins,cephalosporins and carbapenems.  Susceptibility testing not routinely performed      Narrative:      1. Left 1st Metatarsal Bone    AFB Culture & Smear [[id number]] Collected: 09/04/20 2010    Order Status: Completed Specimen: Foot, Left Updated: 09/07/20 1313     AFB Culture & Smear Culture in progress     AFB CULTURE STAIN No acid fast bacilli seen.    Narrative:      1. Left 1st Metatarsal Bone    AFB Culture & Smear [[id number]] Collected: 09/04/20 2010    Order Status: Completed Specimen: Foot, Left Updated: 09/07/20 1313     AFB Culture & Smear Culture in progress     AFB CULTURE STAIN No acid fast bacilli seen.    Narrative:      1. Left 1st Metatarsal Bone Clean Margin    Blood culture [[id number]] Collected: 09/01/20 2306    Order Status: Completed Specimen: Blood Updated: 09/07/20 0612     Blood Culture, Routine No growth after 5 days.    Blood culture [[id number]] Collected: 09/01/20 2211    Order Status: Completed Specimen: Blood from Peripheral, Antecubital, Right Updated: 09/06/20 2312     Blood Culture, Routine No growth after 5 days.    Culture, Anaerobe [[id number]] Collected: 09/04/20 2010    Order Status: Completed Specimen: Foot, Left Updated: 09/06/20 1203     Anaerobic Culture Culture in progress    Narrative:      1. Left 1st Metatarsal Bone    Culture, Anaerobe [[id number]] Collected: 09/04/20 2010    Order Status: Completed Specimen: Foot, Left Updated: 09/06/20 1203     Anaerobic Culture Culture in progress    Narrative:      1. Left 1st Metatarsal Bone Clean Margin    Gram stain [[id number]] Collected:  09/04/20 2010    Order Status: Completed Specimen: Foot, Left Updated: 09/04/20 2141     Gram Stain Result No WBC's      No organisms seen    Narrative:      1. Left 1st Metatarsal Bone Clean Margin    Gram stain [[id number]] Collected: 09/04/20 2010    Order Status: Completed Specimen: Foot, Left Updated: 09/04/20 2138     Gram Stain Result Rare WBC's      No organisms seen    Narrative:      1. Left 1st Metatarsal Bone    Fungus culture [[id number]] Collected: 09/04/20 2010    Order Status: Sent Specimen: Foot, Left Updated: 09/04/20 2051    Fungus culture [[id number]] Collected: 09/04/20 2010    Order Status: Sent Specimen: Foot, Left Updated: 09/04/20 2049    Aerobic culture (Specify Source) **CANNOT BE ORDERED AS STAT* [[id number]]  (Abnormal)  (Susceptibility) Collected: 09/01/20 1731    Order Status: Completed Specimen: Incision site from Toe, Left Foot Updated: 09/03/20 1306     Aerobic Bacterial Culture KLEBSIELLA PNEUMONIAE  Rare        STREPTOCOCCUS AGALACTIAE (GROUP B)  Few  Beta-hemolytic streptococci are routinely susceptible to   penicillins,cephalosporins and carbapenems.  Susceptibility testing not routinely performed            Procalcitonin: No results for input(s): PROCAL in the last 48 hours.  Urine Culture: No results for input(s): LABURIN in the last 4320 hours.  Wound Culture:   Recent Labs   Lab 07/04/20  1212 07/14/20  0830 07/28/20  1617 09/01/20  1731 09/04/20 2011   LABAERO No growth  No growth No growth STREPTOCOCCUS AGALACTIAE (GROUP B)  Rare  Beta-hemolytic streptococci are routinely susceptible to   penicillins,cephalosporins and carbapenems.  * KLEBSIELLA PNEUMONIAE  Rare  *  STREPTOCOCCUS AGALACTIAE (GROUP B)  Few  Beta-hemolytic streptococci are routinely susceptible to   penicillins,cephalosporins and carbapenems.  Susceptibility testing not routinely performed  * DIPHTHEROIDS  Rare  *  STREPTOCOCCUS AGALACTIAE (GROUP B)  Few  Beta-hemolytic streptococci are routinely susceptible  to   penicillins,cephalosporins and carbapenems.  Susceptibility testing not routinely performed  *       Significant Imaging: None

## 2020-09-08 NOTE — PLAN OF CARE
Patient was not seen today. Reviewed chart. Bone culture ngtd. Awaiting pathology of clean margins from amputation to determine outpatient antibiotic plan.

## 2020-09-08 NOTE — SUBJECTIVE & OBJECTIVE
"  Subjective:     Interval History: NAEON. Patient reports still feeling "great". Strikethrough of the plantar aspect of the bandages, patient reports he has been doing some walking in his room but tries to put most of his weight on his heel and always uses the postop shoe. Denies N/V/F/C, foot pain. Vitals stable other than transient bradycardia. WBCs WNL, last ESR was increasing and last CRP was decreasing, no ESR/CRP within past 2 days. Clean margin from surgery positive for diphtheroids.     Follow-up For: Procedure(s) (LRB):  INCISION AND DRAINAGE, FOOT (Left)  OSTEOTOMY, METATARSAL BONE, 1st METATARSAL RESECTION    Post-Operative Day: 4 Days Post-Op    Scheduled Meds:   aspirin  81 mg Oral Daily    atorvastatin  80 mg Oral Daily    carvediloL  12.5 mg Oral BID    cefTRIAXone (ROCEPHIN) IVPB  2 g Intravenous Q24H    enoxaparin  40 mg Subcutaneous Q24H    insulin aspart U-100  12 Units Subcutaneous TIDWM    insulin detemir U-100  48 Units Subcutaneous QHS    lisinopriL  40 mg Oral Daily     Continuous Infusions:  PRN Meds:acetaminophen, dextrose 50%, dextrose 50%, glucagon (human recombinant), glucose, glucose, insulin aspart U-100, melatonin, ondansetron, promethazine (PHENERGAN) IVPB, sodium chloride 0.9%    Review of Systems   Constitutional: Negative for chills and fever.   HENT: Negative for congestion, ear pain, rhinorrhea and sneezing.    Eyes: Negative for pain.   Respiratory: Positive for cough. Negative for shortness of breath.    Cardiovascular: Positive for leg swelling. Negative for chest pain.   Gastrointestinal: Negative for abdominal pain, constipation, diarrhea, nausea and vomiting (chronic).   Endocrine: Negative for polyuria.   Genitourinary: Negative for decreased urine volume, difficulty urinating, dysuria and flank pain.   Musculoskeletal: Negative for back pain, neck pain and neck stiffness.   Skin: Positive for wound. Negative for rash.   Neurological: Positive for numbness. " Negative for weakness and headaches.   Psychiatric/Behavioral: Negative for confusion. The patient is not nervous/anxious.      Objective:     Vital Signs (Most Recent):  Temp: 97.5 °F (36.4 °C) (09/08/20 0838)  Pulse: 60 (09/08/20 0838)  Resp: 16 (09/08/20 0838)  BP: 139/84 (09/08/20 0838)  SpO2: 97 % (09/08/20 0838) Vital Signs (24h Range):  Temp:  [97.5 °F (36.4 °C)-98.3 °F (36.8 °C)] 97.5 °F (36.4 °C)  Pulse:  [53-60] 60  Resp:  [16-18] 16  SpO2:  [97 %-100 %] 97 %  BP: (139-140)/(84-85) 139/84     Weight: 120.2 kg (265 lb)  Body mass index is 34.01 kg/m².    Foot Exam    General  Orientation: alert and oriented to person, place, and time   Affect: appropriate       Right Foot/Ankle     Inspection and Palpation  Ecchymosis: none  Tenderness: none   Swelling: none   Skin Exam: skin intact;     Neurovascular  Dorsalis pedis: absent  Posterior tibial: absent  Saphenous nerve sensation: diminished  Tibial nerve sensation: diminished  Superficial peroneal nerve sensation: diminished  Deep peroneal nerve sensation: diminished  Sural nerve sensation: diminished      Left Foot/Ankle      Inspection and Palpation  Ecchymosis: none  Tenderness: none   Swelling: (Periwound)  Skin Exam: no drainage, no cellulitis and no erythema (surgical wound)    Neurovascular  Dorsalis pedis: absent  Posterior tibial: absent  Saphenous nerve sensation: diminished  Tibial nerve sensation: diminished  Superficial peroneal nerve sensation: diminished  Deep peroneal nerve sensation: diminished  Sural nerve sensation: diminished            Laboratory:  CBC:   Recent Labs   Lab 09/08/20 0428   WBC 7.18   RBC 4.07*   HGB 11.7*   HCT 35.0*      MCV 86   MCH 28.7   MCHC 33.4     CMP:   Recent Labs   Lab 09/08/20 0428   *   CALCIUM 8.7   ALBUMIN 2.6*   PROT 6.7   *   K 4.4   CO2 24      BUN 14   CREATININE 0.8   ALKPHOS 90   ALT 19   AST 16   BILITOT 0.3     CRP:   Recent Labs   Lab 09/06/20  0636   CRP 67.5*     ESR:    Recent Labs   Lab 09/06/20  0636   SEDRATE 91*     Wound Cultures:   Recent Labs   Lab 07/04/20  1212 07/14/20  0830 07/28/20  1617 09/01/20  1731 09/04/20 2011   LABAERO No growth  No growth No growth STREPTOCOCCUS AGALACTIAE (GROUP B)  Rare  Beta-hemolytic streptococci are routinely susceptible to   penicillins,cephalosporins and carbapenems.  * KLEBSIELLA PNEUMONIAE  Rare  *  STREPTOCOCCUS AGALACTIAE (GROUP B)  Few  Beta-hemolytic streptococci are routinely susceptible to   penicillins,cephalosporins and carbapenems.  Susceptibility testing not routinely performed  * DIPHTHEROIDS  Rare  *  STREPTOCOCCUS AGALACTIAE (GROUP B)  Few  Beta-hemolytic streptococci are routinely susceptible to   penicillins,cephalosporins and carbapenems.  Susceptibility testing not routinely performed  *     Microbiology Results (last 7 days)     Procedure Component Value Units Date/Time    Aerobic culture [[id number]]  (Abnormal) Collected: 09/04/20 2011    Order Status: Completed Specimen: Bone from Foot, Left Updated: 09/08/20 1004     Aerobic Bacterial Culture DIPHTHEROIDS  Rare      Narrative:      1. Left 1st Metatarsal Bone Clean Margin    Aerobic culture [[id number]]  (Abnormal) Collected: 09/04/20 2011    Order Status: Completed Specimen: Bone from Foot, Left Updated: 09/07/20 1413     Aerobic Bacterial Culture STREPTOCOCCUS AGALACTIAE (GROUP B)  Few  Beta-hemolytic streptococci are routinely susceptible to   penicillins,cephalosporins and carbapenems.  Susceptibility testing not routinely performed      Narrative:      1. Left 1st Metatarsal Bone    AFB Culture & Smear [[id number]] Collected: 09/04/20 2010    Order Status: Completed Specimen: Foot, Left Updated: 09/07/20 1313     AFB Culture & Smear Culture in progress     AFB CULTURE STAIN No acid fast bacilli seen.    Narrative:      1. Left 1st Metatarsal Bone    AFB Culture & Smear [[id number]] Collected: 09/04/20 2010    Order Status: Completed Specimen: Foot, Left  Updated: 09/07/20 1313     AFB Culture & Smear Culture in progress     AFB CULTURE STAIN No acid fast bacilli seen.    Narrative:      1. Left 1st Metatarsal Bone Clean Margin    Blood culture [[id number]] Collected: 09/01/20 2306    Order Status: Completed Specimen: Blood Updated: 09/07/20 0612     Blood Culture, Routine No growth after 5 days.    Blood culture [[id number]] Collected: 09/01/20 2211    Order Status: Completed Specimen: Blood from Peripheral, Antecubital, Right Updated: 09/06/20 2312     Blood Culture, Routine No growth after 5 days.    Culture, Anaerobe [[id number]] Collected: 09/04/20 2010    Order Status: Completed Specimen: Foot, Left Updated: 09/06/20 1203     Anaerobic Culture Culture in progress    Narrative:      1. Left 1st Metatarsal Bone    Culture, Anaerobe [[id number]] Collected: 09/04/20 2010    Order Status: Completed Specimen: Foot, Left Updated: 09/06/20 1203     Anaerobic Culture Culture in progress    Narrative:      1. Left 1st Metatarsal Bone Clean Margin    Gram stain [[id number]] Collected: 09/04/20 2010    Order Status: Completed Specimen: Foot, Left Updated: 09/04/20 2141     Gram Stain Result No WBC's      No organisms seen    Narrative:      1. Left 1st Metatarsal Bone Clean Margin    Gram stain [[id number]] Collected: 09/04/20 2010    Order Status: Completed Specimen: Foot, Left Updated: 09/04/20 2138     Gram Stain Result Rare WBC's      No organisms seen    Narrative:      1. Left 1st Metatarsal Bone    Fungus culture [[id number]] Collected: 09/04/20 2010    Order Status: Sent Specimen: Foot, Left Updated: 09/04/20 2051    Fungus culture [[id number]] Collected: 09/04/20 2010    Order Status: Sent Specimen: Foot, Left Updated: 09/04/20 2049    Aerobic culture (Specify Source) **CANNOT BE ORDERED AS STAT* [[id number]]  (Abnormal)  (Susceptibility) Collected: 09/01/20 1731    Order Status: Completed Specimen: Incision site from Toe, Left Foot Updated: 09/03/20 1306     Aerobic  Bacterial Culture KLEBSIELLA PNEUMONIAE  Rare        STREPTOCOCCUS AGALACTIAE (GROUP B)  Few  Beta-hemolytic streptococci are routinely susceptible to   penicillins,cephalosporins and carbapenems.  Susceptibility testing not routinely performed          Specimen (12h ago, onward)    None          Diagnostic Results  Resected Metatarsal Culture 9/4: Group B Strep  Clean Margin Culture 9/4: Diphtheroids    Clinical Findings: Strikethrough of posterior aspect of bandages with pure sanguinous fluid. Incision site coapted, sutures intact, no signs of dehiscence, no active drainage, no odor, no tenderness. Mild periwound edema, mild eperiwound erythema and dried blood. No fluid could be expressed on manual compression of the periwound area. Periwound macerated. Surgical wound appears to be healing as expected, skin flaps viable, not clinically infected.

## 2020-09-08 NOTE — ASSESSMENT & PLAN NOTE
A: s/p left partial 1st ray amputation 09/04 for osteomyelitis. POD#4 dressing change, healing as expected, not clinically infected. Vitals stable other than transient bradycardia. WBCs WNL, last ESR was increasing and last CRP was decreasing, no ESR/CRP within past 2 days. Clean margin from surgery positive for diphtheroids which is likely a contaminant.     Plan:  -Dressing changed with betadine applied to macerated periwound.   -Followup on path results of clean margin.   -Antibiotic management per ID.   -Continue local wound care.   -Rest of care per primary  -Podiatry will continue to follow.

## 2020-09-08 NOTE — PROGRESS NOTES
[facility] - ICU 14 [facility] Medicine  Telemedicine Progress Note    Patient Name: Billy Sheffield Miguel  MRN: [id number]  Patient Class: IP- Inpatient   Admission Date: 9/1/2020  Length of Stay: 7 days  Attending Physician: Gisella Cheng MD  Primary Care Provider: BRAD Baker MD    [facility] Medicine Team: [facility] VIRTUAL TEAM 10 Gisella Cheng MD  Virtual Telemedicine Progress Note  Start time: 1504  Chief complaint: Acute osteomyelitis of left foot  The patient location is: [id number]  The patient arrived at: 9/1/2020  3:22 PM  Present with the patient at the time of the telemed/virtual assessment: n/a  End time:  1521  Total time spent with patient: 17 min  I have assessed findings virtually using a telemedicine platform and with assistance of the bedside nurse or telemedicine presenter.  The attending portion of this evaluation, treatment, and documentation was performed per Gisella Cheng MD via audiovisual.    Patient was transferred to the telemedicine service on: 9/6/2020    Subjective:     Admission CC:   Chief Complaint   Patient presents with    Leg Pain     sent from podiatry for admission , infected L foot     Follow up visit for: Acute osteomyelitis of left foot    Interval History / Events Overnight:   The patient is able to provide adequate history. Additional history was obtained from past medical records. No significant events reported by Nursing. Stable on RA.  Patient complains of nothing specific. Symptoms have improved since yesterday. Associated symptoms include: fatigue. Symptoms are decreasing in severity. Alleviating factors include: rest.     Data reviewed 9/8/2020: Lab test(s) reviewed: H&H stable. Hyperglycemia  I have assessed findings virtually using a telemedicine platform and with assistance of the bedside nurse or telemedicine presenter.      Review of Systems   Constitutional: Negative for fever.   Respiratory: Negative for shortness of breath.       Objective:     Vital Signs (Most Recent):  Temp: 97.5 °F (36.4 °C) (09/08/20 0838)  Pulse: 60 (09/08/20 0838)  Resp: 16 (09/08/20 0838)  BP: 139/84 (09/08/20 0838)  SpO2: 97 % (09/08/20 0838) Vital Signs (24h Range):  Temp:  [97.5 °F (36.4 °C)-98 °F (36.7 °C)] 97.5 °F (36.4 °C)  Pulse:  [53-60] 60  Resp:  [16] 16  SpO2:  [97 %-100 %] 97 %  BP: (139)/(84) 139/84     Weight: 120.2 kg (265 lb)  Body mass index is 34.01 kg/m².    Intake/Output Summary (Last 24 hours) at 9/8/2020 1717  Last data filed at 9/7/2020 1806  Gross per 24 hour   Intake 1050 ml   Output --   Net 1050 ml      Physical Exam  Constitutional:       General: He is not in acute distress.     Appearance: Normal appearance. He is not diaphoretic.   Eyes:      General: Lids are normal. No scleral icterus.        Right eye: No discharge.         Left eye: No discharge.      Conjunctiva/sclera: Conjunctivae normal.   Neck:      Trachea: Phonation normal.   Cardiovascular:      Rate and Rhythm: Normal rate.   Pulmonary:      Effort: Pulmonary effort is normal. No tachypnea, accessory muscle usage or respiratory distress.   Abdominal:      General: There is no distension.   Neurological:      Mental Status: He is alert and oriented to person, place, and time. He is not disoriented.   Psychiatric:         Attention and Perception: Attention normal.         Mood and Affect: Affect normal.         Speech: Speech normal.         Behavior: Behavior is cooperative.         Significant Labs:   Recent Labs   Lab 09/06/20  0636 09/07/20  0529 09/08/20  0428   WBC 7.78 6.40 7.18   HGB 12.5* 11.8* 11.7*   HCT 37.9* 35.6* 35.0*    296 312     Recent Labs   Lab 09/06/20  0636 09/07/20  0529 09/08/20  0428   GRAN 69.4  5.4 59.4  3.8 61.0  4.4   LYMPH 17.4*  1.4 23.0  1.5 23.1  1.7   MONO 10.8  0.8 13.8  0.9 11.4  0.8   EOS 0.1 0.2 0.2     Recent Labs   Lab 09/06/20  0636 09/07/20  0529 09/08/20  0428   * 134* 134*   K 3.9 4.3 4.4    102 101    CO2 24 26 24   BUN 9 10 14   CREATININE 0.8 0.8 0.8   * 254* 241*   CALCIUM 8.9 8.6* 8.7   ALBUMIN 2.6* 2.6* 2.6*   MG 1.6 1.8 1.7   PHOS 3.3 3.6 4.1     Recent Labs   Lab 09/04/20  0357  09/06/20  0636 09/07/20  0529 09/08/20  0428   ALKPHOS 97   < > 98 90 90   ALT 9*   < > 22 22 19   AST 11   < > 18 17 16   PROT 6.6   < > 7.0 6.7 6.7   BILITOT 0.3   < > 0.5 0.4 0.3   INR 1.0  --   --   --   --    CRP  --   --  67.5*  --   --     < > = values in this interval not displayed.     Recent Labs   Lab 07/03/20  1530 07/03/20 2019 07/14/20  1006 09/01/20  1546 09/02/20  0234 09/06/20  0636   PROCAL  --  0.17  --   --   --   --   --    LACTATE 1.2 1.3  --   --   --   --   --    FERRITIN  --   --   --   --   --  470*  --    SEDRATE  --   --    < > 45* 47*  --  91*    < > = values in this interval not displayed.     SARS-CoV-2 RNA, Amplification, Qual (no units)   Date Value   09/01/2020 Positive (A)   07/03/2020 Negative     Recent Labs   Lab 04/14/20  1555 07/03/20 2020 08/12/20  0837   HGBA1C 13.1* 11.8* 11.5*      Recent Labs   Lab 09/07/20  2006 09/08/20  0834 09/08/20  1220   POCTGLUCOSE 299* 215* 202*     Recent Labs   Lab 08/12/20  0837   TSH 1.554       Significant Imaging:     Assessment/Plan:      Active Diagnoses:    Diagnosis Date Noted POA    PRINCIPAL PROBLEM:  Acute osteomyelitis of left foot [M86.172] 09/02/2020 Yes    Coronavirus infection [B34.2]  Yes    Diabetic foot infection [E11.628, L08.9] 09/01/2020 Yes    COVID-19 virus detected [U07.1] 09/01/2020 Yes    PVD (peripheral vascular disease) [I73.9] 06/15/2015 Yes    Uncontrolled type II diabetes mellitus [E11.65] 06/15/2015 Yes    Coronary artery disease involving native coronary artery of native heart without angina pectoris [I25.10] 06/25/2013 Yes    Dyslipidemia [E78.5] 05/22/2013 Yes    Essential hypertension [I10] 09/27/2012 Yes      Problems Resolved During this Admission:       Overview / ICU Course:    Billy Rivera  is a 55 y.o. male admitted for Acute osteomyelitis of left foot.    Inpatient Medications Prescribed for Management of Current Problems:     Scheduled Meds:    aspirin  81 mg Oral Daily    atorvastatin  80 mg Oral Daily    carvediloL  12.5 mg Oral BID    cefTRIAXone (ROCEPHIN) IVPB  2 g Intravenous Q24H    enoxaparin  40 mg Subcutaneous Q24H    insulin aspart U-100  12 Units Subcutaneous TIDWM    insulin detemir U-100  48 Units Subcutaneous QHS    lisinopriL  40 mg Oral Daily     Continuous Infusions:   As Needed: acetaminophen, dextrose 50%, dextrose 50%, glucagon (human recombinant), glucose, glucose, insulin aspart U-100, melatonin, ondansetron, promethazine (PHENERGAN) IVPB, sodium chloride 0.9%    Assessment and Plan by Problem    Diabetic Foot Infection_Left Foot Osteomyelitis  -polymicrobial wound culture - pan-sensitive, Klebsiella and Strept group B  -continue ceftriaxone  -Podiatry_Vascular Surgery_infectious disease consults following.   -s/p left partial 1st ray amputation 09/04 for osteomyelitis  -post-op culture and biopsy results pending; ID following     Viral Pneumonia due to COVID-19  - COVID-19 testing:   - Isolation: Airborne/Droplet. Surgical mask on patient. Notify Infection Control  - Diagnostics: Trend Q48hrs if stable, more frequently if patient decompensating     Mostly asymptomatic      Diabetes mellitus with insulin therapy  - Hgb A1C 11.5 8/2020  - Home medication: Glargine 55 U HS and metformin 1 g BID, humulin 20 TID WM  - basal and bolus insulin doses increased 9/6  - Aspart increased 9/8; basal insulin increased slightly for 9/9     Dyslipidemia associated with type 2 diabetes mellitus  - Continue home medication Atorvastatin     PVD (peripheral vascular disease)  - Continue home medications ASA and atorvastatin     Coronary artery disease involving native coronary artery of native heart without angina pectoris  -No acute issues  -Continue home ASA, statin     HTN  (hypertension)  -Continue home meds: carvedilol 12.5 and lisinopril 40mg       Diet: Diet diabetic [facility]; 2000 Calorie; Double Portions; Isolation Tray - Styrofoam  GI Prophylaxis: Not indicated  Significant LDAs:   IV Access Type: Peripheral  Urinary Catheter Indication if present: Patient Does Not Have Urinary Catheter  Other Lines/Tubes/Drains:    Goals of Care:    Previous admission:  7/3/20  Likely prognosis:  Fair  Code Status: Full Code  Comfort Only: No  Hospice: No  Goals at discharge: remain at home, with physician follow-up    Discharge Planning   DARVIN: 9/9/2020     Code Status: Full Code   Is the patient medically ready for discharge?: No    Reason for patient still in hospital (select all that apply): Laboratory test and Consult recommendations  Discharge Plan A: Home, Home with family, Home Health        VTE Risk Mitigation (From admission, onward)         Ordered     enoxaparin injection 40 mg  Every 24 hours      09/01/20 1810     IP VTE HIGH RISK PATIENT  Once      09/01/20 1810                   Gisella Cheng MD  Department of Hospital Medicine   [facility] - ICU [facility]

## 2020-09-08 NOTE — PROGRESS NOTES
"[facility] - ICU 14 [facility]  Podiatry  Progress Note    Patient Name: Billy Yatesn  MRN: [id number]  Admission Date: 9/1/2020  Hospital Length of Stay: 7 days  Attending Physician: Gisella Cheng MD  Primary Care Provider: BRAD Baker MD     Subjective:     Interval History: NAEON. Patient reports still feeling "great". Strikethrough of the plantar aspect of the bandages, patient reports he has been doing some walking in his room but tries to put most of his weight on his heel and always uses the postop shoe. Denies N/V/F/C, foot pain. Vitals stable other than transient bradycardia. WBCs WNL, last ESR was increasing and last CRP was decreasing, no ESR/CRP within past 2 days. Clean margin from surgery positive for diphtheroids.     Follow-up For: Procedure(s) (LRB):  INCISION AND DRAINAGE, FOOT (Left)  OSTEOTOMY, METATARSAL BONE, 1st METATARSAL RESECTION    Post-Operative Day: 4 Days Post-Op    Scheduled Meds:   aspirin  81 mg Oral Daily    atorvastatin  80 mg Oral Daily    carvediloL  12.5 mg Oral BID    cefTRIAXone (ROCEPHIN) IVPB  2 g Intravenous Q24H    enoxaparin  40 mg Subcutaneous Q24H    insulin aspart U-100  12 Units Subcutaneous TIDWM    insulin detemir U-100  48 Units Subcutaneous QHS    lisinopriL  40 mg Oral Daily     Continuous Infusions:  PRN Meds:acetaminophen, dextrose 50%, dextrose 50%, glucagon (human recombinant), glucose, glucose, insulin aspart U-100, melatonin, ondansetron, promethazine (PHENERGAN) IVPB, sodium chloride 0.9%    Review of Systems   Constitutional: Negative for chills and fever.   HENT: Negative for congestion, ear pain, rhinorrhea and sneezing.    Eyes: Negative for pain.   Respiratory: Positive for cough. Negative for shortness of breath.    Cardiovascular: Positive for leg swelling. Negative for chest pain.   Gastrointestinal: Negative for abdominal pain, constipation, diarrhea, nausea and vomiting (chronic).   Endocrine: Negative for polyuria. "   Genitourinary: Negative for decreased urine volume, difficulty urinating, dysuria and flank pain.   Musculoskeletal: Negative for back pain, neck pain and neck stiffness.   Skin: Positive for wound. Negative for rash.   Neurological: Positive for numbness. Negative for weakness and headaches.   Psychiatric/Behavioral: Negative for confusion. The patient is not nervous/anxious.      Objective:     Vital Signs (Most Recent):  Temp: 97.5 °F (36.4 °C) (09/08/20 0838)  Pulse: 60 (09/08/20 0838)  Resp: 16 (09/08/20 0838)  BP: 139/84 (09/08/20 0838)  SpO2: 97 % (09/08/20 0838) Vital Signs (24h Range):  Temp:  [97.5 °F (36.4 °C)-98.3 °F (36.8 °C)] 97.5 °F (36.4 °C)  Pulse:  [53-60] 60  Resp:  [16-18] 16  SpO2:  [97 %-100 %] 97 %  BP: (139-140)/(84-85) 139/84     Weight: 120.2 kg (265 lb)  Body mass index is 34.01 kg/m².    Foot Exam    General  Orientation: alert and oriented to person, place, and time   Affect: appropriate       Right Foot/Ankle     Inspection and Palpation  Ecchymosis: none  Tenderness: none   Swelling: none   Skin Exam: skin intact;     Neurovascular  Dorsalis pedis: absent  Posterior tibial: absent  Saphenous nerve sensation: diminished  Tibial nerve sensation: diminished  Superficial peroneal nerve sensation: diminished  Deep peroneal nerve sensation: diminished  Sural nerve sensation: diminished      Left Foot/Ankle      Inspection and Palpation  Ecchymosis: none  Tenderness: none   Swelling: (Periwound)  Skin Exam: no drainage, no cellulitis and no erythema (surgical wound)    Neurovascular  Dorsalis pedis: absent  Posterior tibial: absent  Saphenous nerve sensation: diminished  Tibial nerve sensation: diminished  Superficial peroneal nerve sensation: diminished  Deep peroneal nerve sensation: diminished  Sural nerve sensation: diminished            Laboratory:  CBC:   Recent Labs   Lab 09/08/20  0428   WBC 7.18   RBC 4.07*   HGB 11.7*   HCT 35.0*      MCV 86   MCH 28.7   MCHC 33.4     CMP:    Recent Labs   Lab 09/08/20  0428   *   CALCIUM 8.7   ALBUMIN 2.6*   PROT 6.7   *   K 4.4   CO2 24      BUN 14   CREATININE 0.8   ALKPHOS 90   ALT 19   AST 16   BILITOT 0.3     CRP:   Recent Labs   Lab 09/06/20  0636   CRP 67.5*     ESR:   Recent Labs   Lab 09/06/20  0636   SEDRATE 91*     Wound Cultures:   Recent Labs   Lab 07/04/20  1212 07/14/20  0830 07/28/20  1617 09/01/20  1731 09/04/20 2011   LABAERO No growth  No growth No growth STREPTOCOCCUS AGALACTIAE (GROUP B)  Rare  Beta-hemolytic streptococci are routinely susceptible to   penicillins,cephalosporins and carbapenems.  * KLEBSIELLA PNEUMONIAE  Rare  *  STREPTOCOCCUS AGALACTIAE (GROUP B)  Few  Beta-hemolytic streptococci are routinely susceptible to   penicillins,cephalosporins and carbapenems.  Susceptibility testing not routinely performed  * DIPHTHEROIDS  Rare  *  STREPTOCOCCUS AGALACTIAE (GROUP B)  Few  Beta-hemolytic streptococci are routinely susceptible to   penicillins,cephalosporins and carbapenems.  Susceptibility testing not routinely performed  *     Microbiology Results (last 7 days)     Procedure Component Value Units Date/Time    Aerobic culture [[id number]]  (Abnormal) Collected: 09/04/20 2011    Order Status: Completed Specimen: Bone from Foot, Left Updated: 09/08/20 1004     Aerobic Bacterial Culture DIPHTHEROIDS  Rare      Narrative:      1. Left 1st Metatarsal Bone Clean Margin    Aerobic culture [[id number]]  (Abnormal) Collected: 09/04/20 2011    Order Status: Completed Specimen: Bone from Foot, Left Updated: 09/07/20 1413     Aerobic Bacterial Culture STREPTOCOCCUS AGALACTIAE (GROUP B)  Few  Beta-hemolytic streptococci are routinely susceptible to   penicillins,cephalosporins and carbapenems.  Susceptibility testing not routinely performed      Narrative:      1. Left 1st Metatarsal Bone    AFB Culture & Smear [[id number]] Collected: 09/04/20 2010    Order Status: Completed Specimen: Foot, Left Updated: 09/07/20  1313     AFB Culture & Smear Culture in progress     AFB CULTURE STAIN No acid fast bacilli seen.    Narrative:      1. Left 1st Metatarsal Bone    AFB Culture & Smear [[id number]] Collected: 09/04/20 2010    Order Status: Completed Specimen: Foot, Left Updated: 09/07/20 1313     AFB Culture & Smear Culture in progress     AFB CULTURE STAIN No acid fast bacilli seen.    Narrative:      1. Left 1st Metatarsal Bone Clean Margin    Blood culture [[id number]] Collected: 09/01/20 2306    Order Status: Completed Specimen: Blood Updated: 09/07/20 0612     Blood Culture, Routine No growth after 5 days.    Blood culture [[id number]] Collected: 09/01/20 2211    Order Status: Completed Specimen: Blood from Peripheral, Antecubital, Right Updated: 09/06/20 2312     Blood Culture, Routine No growth after 5 days.    Culture, Anaerobe [[id number]] Collected: 09/04/20 2010    Order Status: Completed Specimen: Foot, Left Updated: 09/06/20 1203     Anaerobic Culture Culture in progress    Narrative:      1. Left 1st Metatarsal Bone    Culture, Anaerobe [[id number]] Collected: 09/04/20 2010    Order Status: Completed Specimen: Foot, Left Updated: 09/06/20 1203     Anaerobic Culture Culture in progress    Narrative:      1. Left 1st Metatarsal Bone Clean Margin    Gram stain [[id number]] Collected: 09/04/20 2010    Order Status: Completed Specimen: Foot, Left Updated: 09/04/20 2141     Gram Stain Result No WBC's      No organisms seen    Narrative:      1. Left 1st Metatarsal Bone Clean Margin    Gram stain [[id number]] Collected: 09/04/20 2010    Order Status: Completed Specimen: Foot, Left Updated: 09/04/20 2138     Gram Stain Result Rare WBC's      No organisms seen    Narrative:      1. Left 1st Metatarsal Bone    Fungus culture [[id number]] Collected: 09/04/20 2010    Order Status: Sent Specimen: Foot, Left Updated: 09/04/20 2051    Fungus culture [[id number]] Collected: 09/04/20 2010    Order Status: Sent Specimen: Foot, Left Updated:  "09/04/20 2049    Aerobic culture (Specify Source) **CANNOT BE ORDERED AS STAT* [[id number]]  (Abnormal)  (Susceptibility) Collected: 09/01/20 1731    Order Status: Completed Specimen: Incision site from Toe, Left Foot Updated: 09/03/20 1306     Aerobic Bacterial Culture KLEBSIELLA PNEUMONIAE  Rare        STREPTOCOCCUS AGALACTIAE (GROUP B)  Few  Beta-hemolytic streptococci are routinely susceptible to   penicillins,cephalosporins and carbapenems.  Susceptibility testing not routinely performed          Specimen (12h ago, onward)    None          Diagnostic Results  Resected Metatarsal Culture 9/4: Group B Strep  Clean Margin Culture 9/4: Diphtheroids    Clinical Findings: Strikethrough of posterior aspect of bandages with pure sanguinous fluid. Incision site coapted, sutures intact, no signs of dehiscence, no active drainage, no odor, no tenderness. Mild periwound edema, mild eperiwound erythema and dried blood. No fluid could be expressed on manual compression of the periwound area. Periwound macerated. Surgical wound appears to be healing as expected, skin flaps viable, not clinically infected.     Assessment/Plan:     * Acute osteomyelitis of left foot  A: s/p left partial 1st ray amputation 09/04 for osteomyelitis. POD#4 dressing change, healing as expected, not clinically infected. Vitals stable other than transient bradycardia. WBCs WNL, last ESR was increasing and last CRP was decreasing, no ESR/CRP within past 2 days. Clean margin from surgery positive for diphtheroids which is likely a contaminant.     Plan:  -Dressing changed with betadine applied to macerated periwound.   -Followup on path results of clean margin.   -Antibiotic management per ID.   -Continue local wound care.   -Rest of care per primary  -Podiatry will continue to follow.       Diabetic foot infection  See "acute osteomyelitis of left foot"    Josiah Saunders DPM PGY-1  Podiatric Medicine & Surgery  [facility]    "

## 2020-09-09 LAB
ALBUMIN SERPL BCP-MCNC: 2.6 G/DL (ref 3.5–5.2)
ALP SERPL-CCNC: 88 U/L (ref 55–135)
ALT SERPL W/O P-5'-P-CCNC: 20 U/L (ref 10–44)
ANION GAP SERPL CALC-SCNC: 9 MMOL/L (ref 8–16)
AST SERPL-CCNC: 15 U/L (ref 10–40)
BACTERIA SPEC ANAEROBE CULT: NORMAL
BASOPHILS # BLD AUTO: 0.04 K/UL (ref 0–0.2)
BASOPHILS NFR BLD: 0.6 % (ref 0–1.9)
BILIRUB SERPL-MCNC: 0.3 MG/DL (ref 0.1–1)
BUN SERPL-MCNC: 16 MG/DL (ref 6–20)
CALCIUM SERPL-MCNC: 8.6 MG/DL (ref 8.7–10.5)
CHLORIDE SERPL-SCNC: 101 MMOL/L (ref 95–110)
CO2 SERPL-SCNC: 25 MMOL/L (ref 23–29)
CREAT SERPL-MCNC: 1 MG/DL (ref 0.5–1.4)
DIFFERENTIAL METHOD: ABNORMAL
EOSINOPHIL # BLD AUTO: 0.2 K/UL (ref 0–0.5)
EOSINOPHIL NFR BLD: 2.8 % (ref 0–8)
ERYTHROCYTE [DISTWIDTH] IN BLOOD BY AUTOMATED COUNT: 13.1 % (ref 11.5–14.5)
EST. GFR  (AFRICAN AMERICAN): >60 ML/MIN/1.73 M^2
EST. GFR  (NON AFRICAN AMERICAN): >60 ML/MIN/1.73 M^2
GLUCOSE SERPL-MCNC: 354 MG/DL (ref 70–110)
HCT VFR BLD AUTO: 35.6 % (ref 40–54)
HGB BLD-MCNC: 11.7 G/DL (ref 14–18)
IMM GRANULOCYTES # BLD AUTO: 0.06 K/UL (ref 0–0.04)
IMM GRANULOCYTES NFR BLD AUTO: 0.9 % (ref 0–0.5)
LYMPHOCYTES # BLD AUTO: 1.8 K/UL (ref 1–4.8)
LYMPHOCYTES NFR BLD: 28.2 % (ref 18–48)
MAGNESIUM SERPL-MCNC: 1.9 MG/DL (ref 1.6–2.6)
MCH RBC QN AUTO: 28.9 PG (ref 27–31)
MCHC RBC AUTO-ENTMCNC: 32.9 G/DL (ref 32–36)
MCV RBC AUTO: 88 FL (ref 82–98)
MONOCYTES # BLD AUTO: 0.7 K/UL (ref 0.3–1)
MONOCYTES NFR BLD: 10.1 % (ref 4–15)
NEUTROPHILS # BLD AUTO: 3.7 K/UL (ref 1.8–7.7)
NEUTROPHILS NFR BLD: 57.4 % (ref 38–73)
NRBC BLD-RTO: 0 /100 WBC
PHOSPHATE SERPL-MCNC: 4.2 MG/DL (ref 2.7–4.5)
PLATELET # BLD AUTO: 310 K/UL (ref 150–350)
PMV BLD AUTO: 9.2 FL (ref 9.2–12.9)
POCT GLUCOSE: 221 MG/DL (ref 70–110)
POCT GLUCOSE: 229 MG/DL (ref 70–110)
POCT GLUCOSE: 259 MG/DL (ref 70–110)
POTASSIUM SERPL-SCNC: 4.4 MMOL/L (ref 3.5–5.1)
PROT SERPL-MCNC: 6.7 G/DL (ref 6–8.4)
RBC # BLD AUTO: 4.05 M/UL (ref 4.6–6.2)
SODIUM SERPL-SCNC: 135 MMOL/L (ref 136–145)
WBC # BLD AUTO: 6.45 K/UL (ref 3.9–12.7)

## 2020-09-09 PROCEDURE — 63600175 PHARM REV CODE 636 W HCPCS: Performed by: INTERNAL MEDICINE

## 2020-09-09 PROCEDURE — 80053 COMPREHEN METABOLIC PANEL: CPT

## 2020-09-09 PROCEDURE — 36415 COLL VENOUS BLD VENIPUNCTURE: CPT

## 2020-09-09 PROCEDURE — C9399 UNCLASSIFIED DRUGS OR BIOLOG: HCPCS | Performed by: INTERNAL MEDICINE

## 2020-09-09 PROCEDURE — 99232 SBSQ HOSP IP/OBS MODERATE 35: CPT | Mod: ,,, | Performed by: INTERNAL MEDICINE

## 2020-09-09 PROCEDURE — 25000003 PHARM REV CODE 250: Performed by: HOSPITALIST

## 2020-09-09 PROCEDURE — 83735 ASSAY OF MAGNESIUM: CPT

## 2020-09-09 PROCEDURE — 63600175 PHARM REV CODE 636 W HCPCS: Performed by: HOSPITALIST

## 2020-09-09 PROCEDURE — 99232 PR SUBSEQUENT HOSPITAL CARE,LEVL II: ICD-10-PCS | Mod: ,,, | Performed by: INTERNAL MEDICINE

## 2020-09-09 PROCEDURE — 25000003 PHARM REV CODE 250: Performed by: INTERNAL MEDICINE

## 2020-09-09 PROCEDURE — 85025 COMPLETE CBC W/AUTO DIFF WBC: CPT

## 2020-09-09 PROCEDURE — 94761 N-INVAS EAR/PLS OXIMETRY MLT: CPT

## 2020-09-09 PROCEDURE — 11000001 HC ACUTE MED/SURG PRIVATE ROOM

## 2020-09-09 PROCEDURE — 84100 ASSAY OF PHOSPHORUS: CPT

## 2020-09-09 RX ADMIN — INSULIN ASPART 4 UNITS: 100 INJECTION, SOLUTION INTRAVENOUS; SUBCUTANEOUS at 08:09

## 2020-09-09 RX ADMIN — INSULIN ASPART 10 UNITS: 100 INJECTION, SOLUTION INTRAVENOUS; SUBCUTANEOUS at 10:09

## 2020-09-09 RX ADMIN — CARVEDILOL 12.5 MG: 12.5 TABLET, FILM COATED ORAL at 08:09

## 2020-09-09 RX ADMIN — ATORVASTATIN CALCIUM 80 MG: 20 TABLET, FILM COATED ORAL at 08:09

## 2020-09-09 RX ADMIN — INSULIN ASPART 6 UNITS: 100 INJECTION, SOLUTION INTRAVENOUS; SUBCUTANEOUS at 12:09

## 2020-09-09 RX ADMIN — INSULIN ASPART 12 UNITS: 100 INJECTION, SOLUTION INTRAVENOUS; SUBCUTANEOUS at 12:09

## 2020-09-09 RX ADMIN — CEFTRIAXONE SODIUM 2 G: 2 INJECTION, SOLUTION INTRAVENOUS at 05:09

## 2020-09-09 RX ADMIN — MELATONIN TAB 3 MG 6 MG: 3 TAB at 12:09

## 2020-09-09 RX ADMIN — INSULIN ASPART 12 UNITS: 100 INJECTION, SOLUTION INTRAVENOUS; SUBCUTANEOUS at 08:09

## 2020-09-09 RX ADMIN — INSULIN ASPART 12 UNITS: 100 INJECTION, SOLUTION INTRAVENOUS; SUBCUTANEOUS at 04:09

## 2020-09-09 RX ADMIN — ASPIRIN 81 MG: 81 TABLET, COATED ORAL at 08:09

## 2020-09-09 RX ADMIN — LISINOPRIL 40 MG: 20 TABLET ORAL at 08:09

## 2020-09-09 RX ADMIN — ENOXAPARIN SODIUM 40 MG: 40 INJECTION SUBCUTANEOUS at 09:09

## 2020-09-09 RX ADMIN — INSULIN DETEMIR 50 UNITS: 100 INJECTION, SOLUTION SUBCUTANEOUS at 09:09

## 2020-09-09 RX ADMIN — INSULIN ASPART 4 UNITS: 100 INJECTION, SOLUTION INTRAVENOUS; SUBCUTANEOUS at 04:09

## 2020-09-09 RX ADMIN — CARVEDILOL 12.5 MG: 12.5 TABLET, FILM COATED ORAL at 09:09

## 2020-09-09 NOTE — PLAN OF CARE
Recommendations    1. Continue diabetic diet with double portions as tolerated.     2. If po intake poor, recommend adding Boost Glucose Control.     3. RD following.     Goals: continue to consume % of meals  Nutrition Goal Status: new

## 2020-09-09 NOTE — PROGRESS NOTES
[facility] - ICU 14 [facility] Medicine  Telemedicine Progress Note    Patient Name: Billy Sheffield Miguel  MRN: [id number]  Patient Class: IP- Inpatient   Admission Date: 9/1/2020  Length of Stay: 8 days  Attending Physician: Gisella Cheng MD  Primary Care Provider: BRAD Baker MD    [facility] Medicine Team: [facility] [place]AM 10 Gisella Cheng MD  Virtual Telemedicine Progress Note  Start time: 1346  Chief complaint: Acute osteomyelitis of left foot  The patient location is: [id number] A  The patient arrived at: 9/1/2020  3:22 PM  Present with the patient at the time of the telemed/virtual assessment: n/a  End time:  1351  Total time spent with patient: 5 min  I have assessed findings virtually using a telemedicine platform and with assistance of the bedside nurse or telemedicine presenter.  The attending portion of this evaluation, treatment, and documentation was performed per Gisella Cheng MD via audiovisual.    Patient was transferred to the telemedicine service on: 9/6/2020    Subjective:     Admission CC:   Chief Complaint   Patient presents with    Leg Pain     sent from podiatry for admission , infected L foot     Follow up visit for: Acute osteomyelitis of left foot    Interval History / Events Overnight:   The patient is able to provide adequate history. Additional history was obtained from past medical records. No significant events reported by Nursing. Stable on RA.  Patient complains of nothing specific. Symptoms have improved since yesterday. Associated symptoms include: fatigue. Symptoms are decreasing in severity. Alleviating factors include: rest.     Data reviewed 9/9/2020: Lab test(s) reviewed: H&H stable. Hyperglycemia  I have assessed findings virtually using a telemedicine platform and with assistance of the bedside nurse or telemedicine presenter.      Review of Systems   Constitutional: Negative for fever.   Respiratory: Negative for shortness of breath.       Objective:     Vital Signs (Most Recent):  Temp: 97.7 °F (36.5 °C) (09/09/20 0752)  Pulse: (!) 54 (09/09/20 0752)  Resp: 14 (09/09/20 0752)  BP: 130/71 (09/09/20 0752)  SpO2: 95 % (09/09/20 0752) Vital Signs (24h Range):  Temp:  [97.6 °F (36.4 °C)-98.4 °F (36.9 °C)] 97.7 °F (36.5 °C)  Pulse:  [53-61] 54  Resp:  [14-20] 14  SpO2:  [95 %-99 %] 95 %  BP: (124-145)/(68-80) 130/71     Weight: 120.2 kg (265 lb)  Body mass index is 34.01 kg/m².    Intake/Output Summary (Last 24 hours) at 9/9/2020 1618  Last data filed at 9/9/2020 0600  Gross per 24 hour   Intake 890 ml   Output --   Net 890 ml      Physical Exam  Constitutional:       General: He is not in acute distress.     Appearance: Normal appearance. He is not diaphoretic.   Eyes:      General: Lids are normal. No scleral icterus.        Right eye: No discharge.         Left eye: No discharge.      Conjunctiva/sclera: Conjunctivae normal.   Neck:      Trachea: Phonation normal.   Cardiovascular:      Rate and Rhythm: Normal rate.   Pulmonary:      Effort: Pulmonary effort is normal. No tachypnea, accessory muscle usage or respiratory distress.   Abdominal:      General: There is no distension.   Neurological:      Mental Status: He is alert and oriented to person, place, and time. He is not disoriented.   Psychiatric:         Attention and Perception: Attention normal.         Mood and Affect: Affect normal.         Speech: Speech normal.         Behavior: Behavior is cooperative.         Significant Labs:   Recent Labs   Lab 09/07/20  0529 09/08/20  0428 09/09/20  0304   WBC 6.40 7.18 6.45   HGB 11.8* 11.7* 11.7*   HCT 35.6* 35.0* 35.6*    312 310     Recent Labs   Lab 09/07/20  0529 09/08/20  0428 09/09/20  0304   GRAN 59.4  3.8 61.0  4.4 57.4  3.7   LYMPH 23.0  1.5 23.1  1.7 28.2  1.8   MONO 13.8  0.9 11.4  0.8 10.1  0.7   EOS 0.2 0.2 0.2     Recent Labs   Lab 09/07/20  0529 09/08/20  0428 09/09/20  0304   * 134* 135*   K 4.3 4.4 4.4   CL  102 101 101   CO2 26 24 25   BUN 10 14 16   CREATININE 0.8 0.8 1.0   * 241* 354*   CALCIUM 8.6* 8.7 8.6*   ALBUMIN 2.6* 2.6* 2.6*   MG 1.8 1.7 1.9   PHOS 3.6 4.1 4.2     Recent Labs   Lab 09/04/20  0357  09/06/20  0636 09/07/20  0529 09/08/20  0428 09/09/20  0304   ALKPHOS 97   < > 98 90 90 88   ALT 9*   < > 22 22 19 20   AST 11   < > 18 17 16 15   PROT 6.6   < > 7.0 6.7 6.7 6.7   BILITOT 0.3   < > 0.5 0.4 0.3 0.3   INR 1.0  --   --   --   --   --    CRP  --   --  67.5*  --   --   --     < > = values in this interval not displayed.     Recent Labs   Lab 07/03/20  1530 07/03/20 2019 07/14/20  1006 09/01/20  1546 09/02/20  0234 09/06/20  0636   PROCAL  --  0.17  --   --   --   --   --    LACTATE 1.2 1.3  --   --   --   --   --    FERRITIN  --   --   --   --   --  470*  --    SEDRATE  --   --    < > 45* 47*  --  91*    < > = values in this interval not displayed.     SARS-CoV-2 RNA, Amplification, Qual (no units)   Date Value   09/01/2020 Positive (A)   07/03/2020 Negative     Recent Labs   Lab 04/14/20  1555 07/03/20 2020 08/12/20  0837   HGBA1C 13.1* 11.8* 11.5*      Recent Labs   Lab 09/08/20  2116 09/09/20  0815 09/09/20  1159   POCTGLUCOSE 221* 229* 259*     Recent Labs   Lab 08/12/20  0837   TSH 1.554       Significant Imaging:     Assessment/Plan:      Active Diagnoses:    Diagnosis Date Noted POA    PRINCIPAL PROBLEM:  Acute osteomyelitis of left foot [M86.172] 09/02/2020 Yes    Coronavirus infection [B34.2]  Yes    Diabetic foot infection [E11.628, L08.9] 09/01/2020 Yes    COVID-19 virus detected [U07.1] 09/01/2020 Yes    PVD (peripheral vascular disease) [I73.9] 06/15/2015 Yes    Uncontrolled type II diabetes mellitus [E11.65] 06/15/2015 Yes    Coronary artery disease involving native coronary artery of native heart without angina pectoris [I25.10] 06/25/2013 Yes    Dyslipidemia [E78.5] 05/22/2013 Yes    Essential hypertension [I10] 09/27/2012 Yes      Problems Resolved During this  Admission:       Overview / ICU Course:    Billy Rivera is a 55 y.o. male admitted for Acute osteomyelitis of left foot.    Inpatient Medications Prescribed for Management of Current Problems:     Scheduled Meds:    aspirin  81 mg Oral Daily    atorvastatin  80 mg Oral Daily    carvediloL  12.5 mg Oral BID    cefTRIAXone (ROCEPHIN) IVPB  2 g Intravenous Q24H    enoxaparin  40 mg Subcutaneous Q24H    insulin aspart U-100  12 Units Subcutaneous TIDWM    insulin detemir U-100  50 Units Subcutaneous QHS    lisinopriL  40 mg Oral Daily     Continuous Infusions:   As Needed: acetaminophen, dextrose 50%, dextrose 50%, glucagon (human recombinant), glucose, glucose, insulin aspart U-100, melatonin, ondansetron, promethazine (PHENERGAN) IVPB, sodium chloride 0.9%    Assessment and Plan by Problem    Diabetic Foot Infection_Left Foot Osteomyelitis  -polymicrobial wound culture - pan-sensitive, Klebsiella and Strept group B  -continue ceftriaxone  -Podiatry_Vascular Surgery_infectious disease consults following.   -s/p left partial 1st ray amputation 09/04 for osteomyelitis  -post-op culture and biopsy results pending; ID following  -Per Podiatry: GBS growing from amputated toe, but margin culture NGTD. Awaiting pathology of clean margin to determine if he will require IV antibiotics on discharge.      Viral Pneumonia due to COVID-19  - COVID-19 testing:   - Isolation: Airborne/Droplet. Surgical mask on patient. Notify Infection Control  - Diagnostics: Trend Q48hrs if stable, more frequently if patient decompensating     Mostly asymptomatic      Diabetes mellitus with insulin therapy  - Hgb A1C 11.5 8/2020  - Home medication: Glargine 55 U HS and metformin 1 g BID, humulin 20 TID WM  - basal and bolus insulin doses increased 9/6  - Aspart increased 9/8; basal insulin increased slightly for 9/9     Dyslipidemia associated with type 2 diabetes mellitus  - Continue home medication Atorvastatin     PVD (peripheral  vascular disease)  - Continue home medications ASA and atorvastatin     Coronary artery disease involving native coronary artery of native heart without angina pectoris  -No acute issues  -Continue home ASA, statin     HTN (hypertension)  -Continue home meds: carvedilol 12.5 and lisinopril 40mg       Diet: Diet diabetic [facility]; 2000 Calorie; Double Portions; Isolation Tray - Styrofoam  GI Prophylaxis: Not indicated  Significant LDAs:   IV Access Type: Peripheral  Urinary Catheter Indication if present: Patient Does Not Have Urinary Catheter  Other Lines/Tubes/Drains:    Goals of Care:    Previous admission:  7/3/20  Likely prognosis:  Fair  Code Status: Full Code  Comfort Only: No  Hospice: No  Goals at discharge: remain at home, with physician follow-up    Discharge Planning   DARVIN: 9/14/2020     Code Status: Full Code   Is the patient medically ready for discharge?: No    Reason for patient still in hospital (select all that apply): Laboratory test and Consult recommendations  Discharge Plan A: Home, Home with family, Home Health        VTE Risk Mitigation (From admission, onward)         Ordered     enoxaparin injection 40 mg  Every 24 hours      09/01/20 1810     IP VTE HIGH RISK PATIENT  Once      09/01/20 1810                   Gisella Cheng MD  Department of Hospital Medicine   [facility] - ICU 14 [facility]

## 2020-09-09 NOTE — NURSING
Pt alert and oriented x4. VSS. Ambulatory in room without difficulty. Denies any shortness of breath, pain or discomfort. Rested well though night with melatonin given. Safety maintained. Will continue to monitor.

## 2020-09-09 NOTE — PLAN OF CARE
D/c update:     Dr. Cheng reports pt may got to ICU after procedure.     Alla Lemus LMSW  Case Management Social Worker   [facility], [address]

## 2020-09-09 NOTE — PROGRESS NOTES
"[facility] - ICU 14 [facility]  Adult Nutrition  Progress Note    SUMMARY       Recommendations    1. Continue diabetic diet with double portions as tolerated.     2. If po intake poor, recommend adding Boost Glucose Control.     3. RD following.     Goals: continue to consume % of meals  Nutrition Goal Status: new  Communication of RD Recs: (POC)    Reason for Assessment    Reason For Assessment: RD follow-up  Diagnosis: (Acute osteomyelitis of left foot)  Relevant Medical History: T2DM, osteomyelitis s/p L hallux amputation 7/2020, PVD, CAD, HLD, HTN   Interdisciplinary Rounds: did not attend  General Information Comments: S/p left partial 1st ray amputation 9/4. Spoke with pt via phone. Pt reports great appetite with no N/V/D/C. Pt reports that he feels great and would like more food. RD to check on double portions. UBW 275lbs. Pt does not meet malnutrition criteria.  Nutrition Discharge Planning: diabetic diet    Nutrition Risk Screen    Nutrition Risk Screen: no indicators present    Nutrition/Diet History    Spiritual, Cultural Beliefs, Quaker Practices, Values that Affect Care: no  Factors Affecting Nutritional Intake: None identified at this time    Anthropometrics    Temp: 97.7 °F (36.5 °C)  Height Method: Stated  Height: 6' 2.02" (188 cm)  Height (inches): 74.02 in  Weight Method: Standard Scale  Weight: 120.2 kg (265 lb)  Weight (lb): 265 lb  Ideal Body Weight (IBW), Male: 190.12 lb  % Ideal Body Weight, Male (lb): 139.39 %  BMI (Calculated): 34  BMI Grade: 30 - 34.9- obesity - grade I     Lab/Procedures/Meds    Pertinent Labs Reviewed: reviewed  Pertinent Labs Comments: Na 135, glucose 354  Pertinent Medications Reviewed: reviewed  Pertinent Medications Comments: aspirin, statin, carvedilol, insulin, lisinopril    Estimated/Assessed Needs    Weight Used For Calorie Calculations: 120.2 kg (264 lb 15.9 oz)  Energy Calorie Requirements (kcal): 2102 kcal/day  Energy Need Method: Zapata-St " Viviane(no AF 2/2 obesity)  Protein Requirements: 120 gm/day(1.0 gm/kg)  Weight Used For Protein Calculations: 120.2 kg (264 lb 15.9 oz)  Fluid Requirements (mL): 1 mL/kcal or per MD     RDA Method (mL): 2102  CHO Requirement: 262 gm    Nutrition Prescription Ordered    Current Diet Order: Diabetic, double portions    Evaluation of Received Nutrient/Fluid Intake    Comments: LBM 9/8  Tolerance: tolerating  % Intake of Estimated Energy Needs: 75 - 100 %  % Meal Intake: 75 - 100 %    Nutrition Risk    Level of Risk/Frequency of Follow-up: (f/u 1 x wk)     Assessment and Plan    Nutrition Problem  Increased Nutrient Needs: Protein     Related to (etiology):   Wound healing     Signs and Symptoms (as evidenced by):   S/p toe amputation, COVID positive      Interventions (treatment strategy):  Collaboration of nutrition care with other providers  Modified diet - Diabetic     Nutrition Diagnosis Status:   New     Monitor and Evaluation    Food and Nutrient Intake: energy intake, food and beverage intake  Food and Nutrient Adminstration: diet order  Physical Activity and Function: nutrition-related ADLs and IADLs  Anthropometric Measurements: weight, weight change, body mass index  Biochemical Data, Medical Tests and Procedures: electrolyte and renal panel, gastrointestinal profile, glucose/endocrine profile, inflammatory profile, lipid profile  Nutrition-Focused Physical Findings: overall appearance     Nutrition Follow-Up    RD Follow-up?: Yes

## 2020-09-10 LAB
ALBUMIN SERPL BCP-MCNC: 2.7 G/DL (ref 3.5–5.2)
ALP SERPL-CCNC: 95 U/L (ref 55–135)
ALT SERPL W/O P-5'-P-CCNC: 20 U/L (ref 10–44)
ANION GAP SERPL CALC-SCNC: 7 MMOL/L (ref 8–16)
AST SERPL-CCNC: 14 U/L (ref 10–40)
BASOPHILS # BLD AUTO: 0.04 K/UL (ref 0–0.2)
BASOPHILS NFR BLD: 0.6 % (ref 0–1.9)
BILIRUB SERPL-MCNC: 0.3 MG/DL (ref 0.1–1)
BUN SERPL-MCNC: 15 MG/DL (ref 6–20)
CALCIUM SERPL-MCNC: 8.7 MG/DL (ref 8.7–10.5)
CHLORIDE SERPL-SCNC: 102 MMOL/L (ref 95–110)
CO2 SERPL-SCNC: 25 MMOL/L (ref 23–29)
CREAT SERPL-MCNC: 0.9 MG/DL (ref 0.5–1.4)
CRP SERPL-MCNC: 8.3 MG/L (ref 0–8.2)
DIFFERENTIAL METHOD: ABNORMAL
EOSINOPHIL # BLD AUTO: 0.2 K/UL (ref 0–0.5)
EOSINOPHIL NFR BLD: 3.1 % (ref 0–8)
ERYTHROCYTE [DISTWIDTH] IN BLOOD BY AUTOMATED COUNT: 13.1 % (ref 11.5–14.5)
ERYTHROCYTE [SEDIMENTATION RATE] IN BLOOD BY WESTERGREN METHOD: 84 MM/HR (ref 0–23)
EST. GFR  (AFRICAN AMERICAN): >60 ML/MIN/1.73 M^2
EST. GFR  (NON AFRICAN AMERICAN): >60 ML/MIN/1.73 M^2
GLUCOSE SERPL-MCNC: 263 MG/DL (ref 70–110)
HCT VFR BLD AUTO: 37 % (ref 40–54)
HGB BLD-MCNC: 12.4 G/DL (ref 14–18)
IMM GRANULOCYTES # BLD AUTO: 0.06 K/UL (ref 0–0.04)
IMM GRANULOCYTES NFR BLD AUTO: 0.9 % (ref 0–0.5)
LYMPHOCYTES # BLD AUTO: 1.6 K/UL (ref 1–4.8)
LYMPHOCYTES NFR BLD: 24 % (ref 18–48)
MAGNESIUM SERPL-MCNC: 1.7 MG/DL (ref 1.6–2.6)
MCH RBC QN AUTO: 28.6 PG (ref 27–31)
MCHC RBC AUTO-ENTMCNC: 33.5 G/DL (ref 32–36)
MCV RBC AUTO: 85 FL (ref 82–98)
MONOCYTES # BLD AUTO: 0.7 K/UL (ref 0.3–1)
MONOCYTES NFR BLD: 10.4 % (ref 4–15)
NEUTROPHILS # BLD AUTO: 4.1 K/UL (ref 1.8–7.7)
NEUTROPHILS NFR BLD: 61 % (ref 38–73)
NRBC BLD-RTO: 0 /100 WBC
PHOSPHATE SERPL-MCNC: 4 MG/DL (ref 2.7–4.5)
PLATELET # BLD AUTO: 322 K/UL (ref 150–350)
PMV BLD AUTO: 9.3 FL (ref 9.2–12.9)
POCT GLUCOSE: 214 MG/DL (ref 70–110)
POCT GLUCOSE: 226 MG/DL (ref 70–110)
POCT GLUCOSE: 227 MG/DL (ref 70–110)
POCT GLUCOSE: 283 MG/DL (ref 70–110)
POCT GLUCOSE: 305 MG/DL (ref 70–110)
POCT GLUCOSE: 410 MG/DL (ref 70–110)
POTASSIUM SERPL-SCNC: 4.4 MMOL/L (ref 3.5–5.1)
PROT SERPL-MCNC: 6.9 G/DL (ref 6–8.4)
RBC # BLD AUTO: 4.34 M/UL (ref 4.6–6.2)
SODIUM SERPL-SCNC: 134 MMOL/L (ref 136–145)
WBC # BLD AUTO: 6.7 K/UL (ref 3.9–12.7)

## 2020-09-10 PROCEDURE — 85025 COMPLETE CBC W/AUTO DIFF WBC: CPT

## 2020-09-10 PROCEDURE — 63600175 PHARM REV CODE 636 W HCPCS: Performed by: INTERNAL MEDICINE

## 2020-09-10 PROCEDURE — 86140 C-REACTIVE PROTEIN: CPT

## 2020-09-10 PROCEDURE — 99232 PR SUBSEQUENT HOSPITAL CARE,LEVL II: ICD-10-PCS | Mod: ,,, | Performed by: INTERNAL MEDICINE

## 2020-09-10 PROCEDURE — 11000001 HC ACUTE MED/SURG PRIVATE ROOM

## 2020-09-10 PROCEDURE — 85652 RBC SED RATE AUTOMATED: CPT

## 2020-09-10 PROCEDURE — 83735 ASSAY OF MAGNESIUM: CPT

## 2020-09-10 PROCEDURE — 84100 ASSAY OF PHOSPHORUS: CPT

## 2020-09-10 PROCEDURE — 25000003 PHARM REV CODE 250: Performed by: INTERNAL MEDICINE

## 2020-09-10 PROCEDURE — 80053 COMPREHEN METABOLIC PANEL: CPT

## 2020-09-10 PROCEDURE — 99232 SBSQ HOSP IP/OBS MODERATE 35: CPT | Mod: ,,, | Performed by: INTERNAL MEDICINE

## 2020-09-10 PROCEDURE — 63600175 PHARM REV CODE 636 W HCPCS: Performed by: HOSPITALIST

## 2020-09-10 PROCEDURE — 94761 N-INVAS EAR/PLS OXIMETRY MLT: CPT

## 2020-09-10 PROCEDURE — C9399 UNCLASSIFIED DRUGS OR BIOLOG: HCPCS | Performed by: INTERNAL MEDICINE

## 2020-09-10 PROCEDURE — 36415 COLL VENOUS BLD VENIPUNCTURE: CPT

## 2020-09-10 PROCEDURE — 25000003 PHARM REV CODE 250: Performed by: HOSPITALIST

## 2020-09-10 RX ORDER — INSULIN ASPART 100 [IU]/ML
15 INJECTION, SOLUTION INTRAVENOUS; SUBCUTANEOUS
Status: DISCONTINUED | OUTPATIENT
Start: 2020-09-11 | End: 2020-09-11 | Stop reason: HOSPADM

## 2020-09-10 RX ADMIN — INSULIN ASPART 3 UNITS: 100 INJECTION, SOLUTION INTRAVENOUS; SUBCUTANEOUS at 08:09

## 2020-09-10 RX ADMIN — LISINOPRIL 40 MG: 20 TABLET ORAL at 09:09

## 2020-09-10 RX ADMIN — INSULIN ASPART 12 UNITS: 100 INJECTION, SOLUTION INTRAVENOUS; SUBCUTANEOUS at 04:09

## 2020-09-10 RX ADMIN — INSULIN DETEMIR 55 UNITS: 100 INJECTION, SOLUTION SUBCUTANEOUS at 08:09

## 2020-09-10 RX ADMIN — INSULIN ASPART 12 UNITS: 100 INJECTION, SOLUTION INTRAVENOUS; SUBCUTANEOUS at 11:09

## 2020-09-10 RX ADMIN — ENOXAPARIN SODIUM 40 MG: 40 INJECTION SUBCUTANEOUS at 08:09

## 2020-09-10 RX ADMIN — MELATONIN TAB 3 MG 6 MG: 3 TAB at 08:09

## 2020-09-10 RX ADMIN — ATORVASTATIN CALCIUM 80 MG: 20 TABLET, FILM COATED ORAL at 09:09

## 2020-09-10 RX ADMIN — CARVEDILOL 12.5 MG: 12.5 TABLET, FILM COATED ORAL at 08:09

## 2020-09-10 RX ADMIN — INSULIN ASPART 12 UNITS: 100 INJECTION, SOLUTION INTRAVENOUS; SUBCUTANEOUS at 07:09

## 2020-09-10 RX ADMIN — INSULIN ASPART 4 UNITS: 100 INJECTION, SOLUTION INTRAVENOUS; SUBCUTANEOUS at 11:09

## 2020-09-10 RX ADMIN — ASPIRIN 81 MG: 81 TABLET, COATED ORAL at 09:09

## 2020-09-10 RX ADMIN — CARVEDILOL 12.5 MG: 12.5 TABLET, FILM COATED ORAL at 09:09

## 2020-09-10 RX ADMIN — CEFTRIAXONE SODIUM 2 G: 2 INJECTION, SOLUTION INTRAVENOUS at 06:09

## 2020-09-10 RX ADMIN — INSULIN ASPART 8 UNITS: 100 INJECTION, SOLUTION INTRAVENOUS; SUBCUTANEOUS at 04:09

## 2020-09-10 NOTE — PROGRESS NOTES
[facility] - ICU 14 [facility] Medicine  Telemedicine Progress Note    Patient Name: Billy Sheffield Miguel  MRN: [id number]  Patient Class: IP- Inpatient   Admission Date: 9/1/2020  Length of Stay: 9 days  Attending Physician: Cristina Capone MD  Primary Care Provider: BRAD Baker MD    [facility] Medicine [place] [facility] [place] Cristina Capone MD  Virtual Telemedicine Progress Note  Start time: 300p  Chief complaint: Acute osteomyelitis of left foot  The patient location is: [id number]/00382 A  The patient arrived at: 9/1/2020  3:22 PM  Present with the patient at the time of the telemed/virtual assessment: n/a  End time:  310p  Total time spent with patient: 10 min  I have assessed findings virtually using a telemedicine platform and with assistance of the bedside nurse or telemedicine presenter.  The attending portion of this evaluation, treatment, and documentation was performed per Cristina Capone MD via audiovisual.    Patient was transferred to the telemedicine service on: 9/6/2020    Subjective:     Admission CC:   Chief Complaint   Patient presents with    Leg Pain     sent from podiatry for admission , infected L foot     Follow up visit for: Acute osteomyelitis of left foot    Interval History / Events Overnight:   Patient denies fever, cough, dyspnea or foot pain; he is patiently waiting pathology/culture results for home plan      Review of Systems   Constitutional: Negative for fever.   Respiratory: Negative for shortness of breath.      Objective:     Vital Signs (Most Recent):  Temp: 97.9 °F (36.6 °C) (09/10/20 1645)  Pulse: 63 (09/10/20 1645)  Resp: 18 (09/10/20 1645)  BP: 139/77 (09/10/20 1645)  SpO2: 98 % (09/10/20 1645) Vital Signs (24h Range):  Temp:  [97.8 °F (36.6 °C)-98.3 °F (36.8 °C)] 97.9 °F (36.6 °C)  Pulse:  [50-66] 63  Resp:  [14-18] 18  SpO2:  [95 %-99 %] 98 %  BP: (118-140)/(63-83) 139/77     Weight: 120.2 kg (265 lb)  Body mass index is 34.01 kg/m².  No intake or output  data in the 24 hours ending 09/10/20 8749   Physical Exam  Constitutional:       General: He is not in acute distress.     Appearance: Normal appearance. He is not diaphoretic.   Eyes:      General: Lids are normal. No scleral icterus.        Right eye: No discharge.         Left eye: No discharge.      Conjunctiva/sclera: Conjunctivae normal.   Neck:      Trachea: Phonation normal.   Cardiovascular:      Rate and Rhythm: Normal rate.   Pulmonary:      Effort: Pulmonary effort is normal. No tachypnea, accessory muscle usage or respiratory distress.   Abdominal:      General: There is no distension.   Feet:      Comments: Left foot wrapped with no obvious deformity    Neurological:      Mental Status: He is alert and oriented to person, place, and time. He is not disoriented.   Psychiatric:         Attention and Perception: Attention normal.         Mood and Affect: Affect normal.         Speech: Speech normal.         Behavior: Behavior is cooperative.         Significant Labs:   Recent Labs   Lab 09/08/20 0428 09/09/20  0304 09/10/20  0606   WBC 7.18 6.45 6.70   HGB 11.7* 11.7* 12.4*   HCT 35.0* 35.6* 37.0*    310 322     Recent Labs   Lab 09/08/20 0428 09/09/20  0304 09/10/20  0606   GRAN 61.0  4.4 57.4  3.7 61.0  4.1   LYMPH 23.1  1.7 28.2  1.8 24.0  1.6   MONO 11.4  0.8 10.1  0.7 10.4  0.7   EOS 0.2 0.2 0.2     Recent Labs   Lab 09/08/20 0428 09/09/20  0304 09/10/20  0605   * 135* 134*   K 4.4 4.4 4.4    101 102   CO2 24 25 25   BUN 14 16 15   CREATININE 0.8 1.0 0.9   * 354* 263*   CALCIUM 8.7 8.6* 8.7   ALBUMIN 2.6* 2.6* 2.7*   MG 1.7 1.9 1.7   PHOS 4.1 4.2 4.0     Recent Labs   Lab 09/04/20  0357  09/06/20  0636  09/08/20 0428 09/09/20  0304 09/10/20  0605   ALKPHOS 97   < > 98   < > 90 88 95   ALT 9*   < > 22   < > 19 20 20   AST 11   < > 18   < > 16 15 14   PROT 6.6   < > 7.0   < > 6.7 6.7 6.9   BILITOT 0.3   < > 0.5   < > 0.3 0.3 0.3   INR 1.0  --   --   --   --   --    --    CRP  --   --  67.5*  --   --   --  8.3*    < > = values in this interval not displayed.     Recent Labs   Lab 07/03/20  1530 07/03/20 2019 09/01/20  1546 09/02/20  0234 09/06/20  0636 09/10/20  0606   PROCAL  --  0.17  --   --   --   --   --    LACTATE 1.2 1.3  --   --   --   --   --    FERRITIN  --   --   --   --  470*  --   --    SEDRATE  --   --    < > 47*  --  91* 84*    < > = values in this interval not displayed.     SARS-CoV-2 RNA, Amplification, Qual (no units)   Date Value   09/01/2020 Positive (A)   07/03/2020 Negative     Recent Labs   Lab 04/14/20  1555 07/03/20 2020 08/12/20  0837   HGBA1C 13.1* 11.8* 11.5*      Recent Labs   Lab 09/10/20  0739 09/10/20  1142 09/10/20  1646   POCTGLUCOSE 227* 214* 305*     Recent Labs   Lab 08/12/20  0837   TSH 1.554       Significant Imaging:     Assessment/Plan:      Active Diagnoses:    Diagnosis Date Noted POA    PRINCIPAL PROBLEM:  Acute osteomyelitis of left foot [M86.172] 09/02/2020 Yes    Coronavirus infection [B34.2]  Yes    Diabetic foot infection [E11.628, L08.9] 09/01/2020 Yes    COVID-19 virus detected [U07.1] 09/01/2020 Yes    PVD (peripheral vascular disease) [I73.9] 06/15/2015 Yes    Uncontrolled type II diabetes mellitus [E11.65] 06/15/2015 Yes    Coronary artery disease involving native coronary artery of native heart without angina pectoris [I25.10] 06/25/2013 Yes    Dyslipidemia [E78.5] 05/22/2013 Yes    Essential hypertension [I10] 09/27/2012 Yes      Problems Resolved During this Admission:       Overview / ICU Course:    Billy Rivera is a 55 y.o. male admitted for Acute osteomyelitis of left foot.    Inpatient Medications Prescribed for Management of Current Problems:     Scheduled Meds:    aspirin  81 mg Oral Daily    atorvastatin  80 mg Oral Daily    carvediloL  12.5 mg Oral BID    cefTRIAXone (ROCEPHIN) IVPB  2 g Intravenous Q24H    enoxaparin  40 mg Subcutaneous Q24H    [START ON 9/11/2020] insulin aspart U-100   15 Units Subcutaneous TIDWM    insulin detemir U-100  55 Units Subcutaneous QHS    lisinopriL  40 mg Oral Daily     Continuous Infusions:   As Needed: acetaminophen, dextrose 50%, dextrose 50%, glucagon (human recombinant), glucose, glucose, insulin aspart U-100, melatonin, ondansetron, promethazine (PHENERGAN) IVPB, sodium chloride 0.9%    Assessment and Plan by Problem -    Diabetic Foot Infection - Left Foot Osteomyelitis  -polymicrobial wound culture - pan-sensitive, Klebsiella and Strept group B  -continue ceftriaxone  -Podiatry_Vascular Surgery_infectious disease consults following.   -s/p left partial 1st ray amputation 09/04 for osteomyelitis  -post-op culture with group B strep/diphtheroids and biopsy results pending; ID following     COVID-19 detected  - COVID-19 testing: positive on 9/1  - Isolation: Airborne/Droplet. Surgical mask on patient. Notify Infection Control  - Diagnostics: Trend Q48hrs if stable, more frequently if patient decompensating     Mostly asymptomatic      Diabetes mellitus with insulin therapy - uncontrolled  - Hgb A1C 11.5 8/2020 - at risk for poor wound healing  - Home medication: Glargine 55 U HS and metformin 1 g BID, humulin 20 TID WM  - basal and bolus insulin doses increased 9/6  - Aspart increased 9/8; basal insulin increased slightly for 9/9, 9/10  - dietician has counseled and ambulatory endocrine referral sent     Dyslipidemia associated with type 2 diabetes mellitus  - Continue home medication Atorvastatin     PVD (peripheral vascular disease)  - Continue home medications ASA and atorvastatin     Coronary artery disease involving native coronary artery of native heart without angina pectoris  -No acute issues  -Continue home ASA, statin     HTN (hypertension)  -Continue home meds: carvedilol 12.5 and lisinopril 40mg       Diet: Diet diabetic [facility]; 2000 Calorie; Double Portions; Isolation Tray - Styrofoam  GI Prophylaxis: Not indicated  Significant LDAs:   IV  Access Type: Peripheral  Urinary Catheter Indication if present: Patient Does Not Have Urinary Catheter  Other Lines/Tubes/Drains:    Goals of Care:    Previous admission:  7/3/20  Likely prognosis:  Fair  Code Status: Full Code  Comfort Only: No  Hospice: No  Goals at discharge: remain at home, with physician follow-up    Discharge Planning   DARVIN: 9/14/2020     Code Status: Full Code   Is the patient medically ready for discharge?: No    Reason for patient still in hospital (select all that apply): Laboratory test and Consult recommendations  Discharge Plan A: Home with family, Home Health   Discharge Delays: None known at this time    VTE Risk Mitigation (From admission, onward)         Ordered     enoxaparin injection 40 mg  Every 24 hours      09/01/20 1810     IP VTE HIGH RISK PATIENT  Once      09/01/20 1810                   Cristina Capone MD  Department of Hospital Medicine   [facility] - ICU 14 [facility]  [phone]

## 2020-09-10 NOTE — PLAN OF CARE
Pt is expected to d/c w/ [facility] when medically ready. Awaiting bone biopsy results - pt will likely d/c w/ wound care and +/- IV abx.    SAULO HUMPHREY [id number] - covering 14 [facility]     09/10/20 1434   Discharge Reassessment   Assessment Type Discharge Planning Reassessment   Discharge Plan A Home with family;Home Health   Discharge Plan B Home with family   Anticipated Discharge Disposition Home-Health   Post-Acute Status   Post-Acute Authorization Home Health   Home Health Status Awaiting Internal Medical Clearance   Discharge Delays None known at this time

## 2020-09-11 VITALS
BODY MASS INDEX: 34.01 KG/M2 | DIASTOLIC BLOOD PRESSURE: 63 MMHG | HEIGHT: 74 IN | RESPIRATION RATE: 18 BRPM | TEMPERATURE: 96 F | HEART RATE: 51 BPM | OXYGEN SATURATION: 99 % | SYSTOLIC BLOOD PRESSURE: 102 MMHG | WEIGHT: 265 LBS

## 2020-09-11 LAB
ALBUMIN SERPL BCP-MCNC: 2.7 G/DL (ref 3.5–5.2)
ALP SERPL-CCNC: 95 U/L (ref 55–135)
ALT SERPL W/O P-5'-P-CCNC: 18 U/L (ref 10–44)
ANION GAP SERPL CALC-SCNC: 6 MMOL/L (ref 8–16)
AST SERPL-CCNC: 15 U/L (ref 10–40)
BACTERIA SPEC ANAEROBE CULT: ABNORMAL
BASOPHILS # BLD AUTO: 0.05 K/UL (ref 0–0.2)
BASOPHILS NFR BLD: 0.7 % (ref 0–1.9)
BILIRUB SERPL-MCNC: 0.3 MG/DL (ref 0.1–1)
BUN SERPL-MCNC: 15 MG/DL (ref 6–20)
CALCIUM SERPL-MCNC: 8.4 MG/DL (ref 8.7–10.5)
CHLORIDE SERPL-SCNC: 100 MMOL/L (ref 95–110)
CO2 SERPL-SCNC: 26 MMOL/L (ref 23–29)
CREAT SERPL-MCNC: 0.9 MG/DL (ref 0.5–1.4)
DIFFERENTIAL METHOD: ABNORMAL
EOSINOPHIL # BLD AUTO: 0.2 K/UL (ref 0–0.5)
EOSINOPHIL NFR BLD: 3.2 % (ref 0–8)
ERYTHROCYTE [DISTWIDTH] IN BLOOD BY AUTOMATED COUNT: 13.2 % (ref 11.5–14.5)
EST. GFR  (AFRICAN AMERICAN): >60 ML/MIN/1.73 M^2
EST. GFR  (NON AFRICAN AMERICAN): >60 ML/MIN/1.73 M^2
FINAL PATHOLOGIC DIAGNOSIS: NORMAL
GLUCOSE SERPL-MCNC: 306 MG/DL (ref 70–110)
GROSS: NORMAL
HCT VFR BLD AUTO: 36.4 % (ref 40–54)
HGB BLD-MCNC: 11.9 G/DL (ref 14–18)
IMM GRANULOCYTES # BLD AUTO: 0.08 K/UL (ref 0–0.04)
IMM GRANULOCYTES NFR BLD AUTO: 1.2 % (ref 0–0.5)
LYMPHOCYTES # BLD AUTO: 1.8 K/UL (ref 1–4.8)
LYMPHOCYTES NFR BLD: 26.5 % (ref 18–48)
MAGNESIUM SERPL-MCNC: 1.7 MG/DL (ref 1.6–2.6)
MCH RBC QN AUTO: 28.4 PG (ref 27–31)
MCHC RBC AUTO-ENTMCNC: 32.7 G/DL (ref 32–36)
MCV RBC AUTO: 87 FL (ref 82–98)
MONOCYTES # BLD AUTO: 0.7 K/UL (ref 0.3–1)
MONOCYTES NFR BLD: 10.9 % (ref 4–15)
NEUTROPHILS # BLD AUTO: 3.9 K/UL (ref 1.8–7.7)
NEUTROPHILS NFR BLD: 57.5 % (ref 38–73)
NRBC BLD-RTO: 0 /100 WBC
PHOSPHATE SERPL-MCNC: 4 MG/DL (ref 2.7–4.5)
PLATELET # BLD AUTO: 300 K/UL (ref 150–350)
PMV BLD AUTO: 9.3 FL (ref 9.2–12.9)
POCT GLUCOSE: 228 MG/DL (ref 70–110)
POCT GLUCOSE: 239 MG/DL (ref 70–110)
POTASSIUM SERPL-SCNC: 4.3 MMOL/L (ref 3.5–5.1)
PROT SERPL-MCNC: 6.8 G/DL (ref 6–8.4)
RBC # BLD AUTO: 4.19 M/UL (ref 4.6–6.2)
SODIUM SERPL-SCNC: 132 MMOL/L (ref 136–145)
WBC # BLD AUTO: 6.78 K/UL (ref 3.9–12.7)

## 2020-09-11 PROCEDURE — 36415 COLL VENOUS BLD VENIPUNCTURE: CPT

## 2020-09-11 PROCEDURE — 99239 HOSP IP/OBS DSCHRG MGMT >30: CPT | Mod: ,,, | Performed by: INTERNAL MEDICINE

## 2020-09-11 PROCEDURE — 83735 ASSAY OF MAGNESIUM: CPT

## 2020-09-11 PROCEDURE — 80053 COMPREHEN METABOLIC PANEL: CPT

## 2020-09-11 PROCEDURE — 99233 PR SUBSEQUENT HOSPITAL CARE,LEVL III: ICD-10-PCS | Mod: ,,, | Performed by: INTERNAL MEDICINE

## 2020-09-11 PROCEDURE — 84100 ASSAY OF PHOSPHORUS: CPT

## 2020-09-11 PROCEDURE — 85025 COMPLETE CBC W/AUTO DIFF WBC: CPT

## 2020-09-11 PROCEDURE — 25000003 PHARM REV CODE 250: Performed by: HOSPITALIST

## 2020-09-11 PROCEDURE — 63600175 PHARM REV CODE 636 W HCPCS: Performed by: INTERNAL MEDICINE

## 2020-09-11 PROCEDURE — 99239 PR HOSPITAL DISCHARGE DAY,>30 MIN: ICD-10-PCS | Mod: ,,, | Performed by: INTERNAL MEDICINE

## 2020-09-11 PROCEDURE — 99233 SBSQ HOSP IP/OBS HIGH 50: CPT | Mod: ,,, | Performed by: INTERNAL MEDICINE

## 2020-09-11 RX ORDER — METRONIDAZOLE 500 MG/1
500 TABLET ORAL EVERY 8 HOURS
Qty: 84 TABLET | Refills: 0 | Status: SHIPPED | OUTPATIENT
Start: 2020-09-11 | End: 2020-10-09

## 2020-09-11 RX ADMIN — INSULIN ASPART 15 UNITS: 100 INJECTION, SOLUTION INTRAVENOUS; SUBCUTANEOUS at 12:09

## 2020-09-11 RX ADMIN — LISINOPRIL 40 MG: 20 TABLET ORAL at 08:09

## 2020-09-11 RX ADMIN — ASPIRIN 81 MG: 81 TABLET, COATED ORAL at 08:09

## 2020-09-11 RX ADMIN — INSULIN ASPART 4 UNITS: 100 INJECTION, SOLUTION INTRAVENOUS; SUBCUTANEOUS at 08:09

## 2020-09-11 RX ADMIN — INSULIN ASPART 4 UNITS: 100 INJECTION, SOLUTION INTRAVENOUS; SUBCUTANEOUS at 12:09

## 2020-09-11 RX ADMIN — INSULIN ASPART 15 UNITS: 100 INJECTION, SOLUTION INTRAVENOUS; SUBCUTANEOUS at 08:09

## 2020-09-11 RX ADMIN — CARVEDILOL 12.5 MG: 12.5 TABLET, FILM COATED ORAL at 08:09

## 2020-09-11 RX ADMIN — ATORVASTATIN CALCIUM 80 MG: 20 TABLET, FILM COATED ORAL at 08:09

## 2020-09-11 NOTE — PROGRESS NOTES
[facility] - ICU 15 WT  Infectious Disease  Progress Note    Patient Name: Billy Sheffield Miguel  MRN: [id number]  Admission Date: 9/1/2020  Length of Stay: 10 days  Attending Physician: Cristina Capone MD  Primary Care Provider: BRAD Baker MD    Isolation Status: Airborne and Contact and Droplet  Assessment/Plan:      * Acute osteomyelitis of left foot  s/p left 1st ray amputation 9/4 for osteomyelitis diagnosed based on MRI and overlying wounds. Clean margin positive for diphtheroids which may be contaminant and fingoldia from broth only, path w/o significant acute inflammation followup on path results.     - S/p partial 1st ray amputation  - Discontinue rocephin and start flagyl 500mg q 8 hours x 4 weeks  - patient informed of potential adverse effects of flagyl including disulfram reaction w/ alcohol and nausea  - ok for discharged from ID perspective  - f/u w/ podiatry outpatient  - will arrange ID outpatient f/u in about 2 weeks      Thank you for your consult. I will sign off. Please contact us if you have any additional questions.    Shan Soto MD  Infectious Disease  [facility] - ICU 15 WT    Subjective:     Principal Problem:Acute osteomyelitis of left foot    HPI: 54 yo M with Hx of DM2, osteomyelitis of L 1st digit (s/p amputation 07/04), PVD, CAD (past MI), HTN, HLD sent by podiatry clinic due to suspected infection at amputation site. Patient has had delayed healing of the surgical wound despite multiple courses of PO antibiotics. Cultures from 07/04 grew Prevotella for which he was prescribed doxycycline and metronidazole. The wound ultimately dehisced and repeat cultures from 07/28 grew Strep agalactiae for which he was prescribed clindamycin. Yesterday at followup podiatry appointment worsening purulence and erythema were noted, patient was sent to ED. Patient denies pain at the amputation site, though he admits to diabetic neuropathy. He tested positive for  COVID-19 with preadmission screen, only symptom is dry cough of 1 month duration.   Interval History: Afebrile. Path from OR negative for significant acute inflammation. Clean margin positive for diphthroids and finegoldia from broth only. Patient eager to go home.    Review of Systems   Constitutional: Negative for fever.   HENT: Negative for congestion.    Eyes: Negative for photophobia.   Respiratory: Negative for shortness of breath.    Cardiovascular: Positive for leg swelling.   Genitourinary: Negative for difficulty urinating.   Skin: Positive for wound. Negative for rash.   Neurological: Negative for weakness.   Psychiatric/Behavioral: Negative for confusion.     Objective:     Vital Signs (Most Recent):  Temp: (pt refused vs) (09/11/20 1700)  Pulse: (!) 51 (09/11/20 1146)  Resp: 18 (09/11/20 1146)  BP: 102/63 (09/11/20 1146)  SpO2: 99 % (09/11/20 1146) Vital Signs (24h Range):  Temp:  [96 °F (35.6 °C)-98 °F (36.7 °C)] 96 °F (35.6 °C)  Pulse:  [51-55] 51  Resp:  [18] 18  SpO2:  [97 %-99 %] 99 %  BP: (102-158)/(63-80) 102/63     Weight: 120.2 kg (265 lb)  Body mass index is 34.01 kg/m².    Estimated Creatinine Clearance: 126.3 mL/min (based on SCr of 0.9 mg/dL).    Physical Exam  Vitals signs reviewed.   Constitutional:       Appearance: Normal appearance. He is normal weight.   HENT:      Head: Normocephalic and atraumatic.   Eyes:      General:         Right eye: No discharge.         Left eye: No discharge.      Extraocular Movements: Extraocular movements intact.      Conjunctiva/sclera: Conjunctivae normal.   Pulmonary:      Effort: Pulmonary effort is normal. No respiratory distress.   Chest:      Chest wall: No tenderness.   Abdominal:      Tenderness: There is no abdominal tenderness. There is no guarding.   Musculoskeletal:         General: No tenderness.      Comments: Left 1st ray amputation   Skin:     General: Skin is warm and dry.      Comments: Post-op dressing wrapped   Neurological:       Mental Status: He is alert and oriented to person, place, and time. Mental status is at baseline.   Psychiatric:         Mood and Affect: Mood normal.         Behavior: Behavior normal.         Significant Labs:   Blood Culture:   Recent Labs   Lab 07/03/20  1450 07/03/20  1541 07/03/20  2020 09/01/20  2211 09/01/20  2306   LABBLOO No growth after 5 days. No growth after 5 days. No growth after 5 days.  No growth after 5 days. No growth after 5 days. No growth after 5 days.     CBC:   Recent Labs   Lab 09/10/20  0606 09/11/20  0453   WBC 6.70 6.78   HGB 12.4* 11.9*   HCT 37.0* 36.4*    300     CMP:   Recent Labs   Lab 09/10/20  0605 09/11/20  0453   * 132*   K 4.4 4.3    100   CO2 25 26   * 306*   BUN 15 15   CREATININE 0.9 0.9   CALCIUM 8.7 8.4*   PROT 6.9 6.8   ALBUMIN 2.7* 2.7*   BILITOT 0.3 0.3   ALKPHOS 95 95   AST 14 15   ALT 20 18   ANIONGAP 7* 6*   EGFRNONAA >60.0 >60.0     Microbiology Results (last 7 days)     Procedure Component Value Units Date/Time    Culture, Anaerobe [[id number]]  (Abnormal) Collected: 09/04/20 2010    Order Status: Completed Specimen: Foot, Left Updated: 09/11/20 1113     Anaerobic Culture FINEGOLDIA MAGNA  From broth only      Narrative:      1. Left 1st Metatarsal Bone Clean Margin    Fungus culture [[id number]] Collected: 09/04/20 2010    Order Status: Completed Specimen: Foot, Left Updated: 09/09/20 1353     Fungus (Mycology) Culture Culture in progress    Narrative:      1. Left 1st Metatarsal Bone Clean Margin    Fungus culture [[id number]] Collected: 09/04/20 2010    Order Status: Completed Specimen: Foot, Left Updated: 09/09/20 1353     Fungus (Mycology) Culture Culture in progress    Narrative:      1. Left 1st Metatarsal Bone    Culture, Anaerobe [[id number]] Collected: 09/04/20 2010    Order Status: Completed Specimen: Foot, Left Updated: 09/09/20 0718     Anaerobic Culture No anaerobes isolated    Narrative:      1. Left 1st Metatarsal Bone     Aerobic culture [[id number]]  (Abnormal) Collected: 09/04/20 2011    Order Status: Completed Specimen: Bone from Foot, Left Updated: 09/08/20 1004     Aerobic Bacterial Culture DIPHTHEROIDS  Rare      Narrative:      1. Left 1st Metatarsal Bone Clean Margin    Aerobic culture [[id number]]  (Abnormal) Collected: 09/04/20 2011    Order Status: Completed Specimen: Bone from Foot, Left Updated: 09/07/20 1413     Aerobic Bacterial Culture STREPTOCOCCUS AGALACTIAE (GROUP B)  Few  Beta-hemolytic streptococci are routinely susceptible to   penicillins,cephalosporins and carbapenems.  Susceptibility testing not routinely performed      Narrative:      1. Left 1st Metatarsal Bone    AFB Culture & Smear [[id number]] Collected: 09/04/20 2010    Order Status: Completed Specimen: Foot, Left Updated: 09/07/20 1313     AFB Culture & Smear Culture in progress     AFB CULTURE STAIN No acid fast bacilli seen.    Narrative:      1. Left 1st Metatarsal Bone    AFB Culture & Smear [[id number]] Collected: 09/04/20 2010    Order Status: Completed Specimen: Foot, Left Updated: 09/07/20 1313     AFB Culture & Smear Culture in progress     AFB CULTURE STAIN No acid fast bacilli seen.    Narrative:      1. Left 1st Metatarsal Bone Clean Margin    Blood culture [[id number]] Collected: 09/01/20 2306    Order Status: Completed Specimen: Blood Updated: 09/07/20 0612     Blood Culture, Routine No growth after 5 days.    Blood culture [[id number]] Collected: 09/01/20 2211    Order Status: Completed Specimen: Blood from Peripheral, Antecubital, Right Updated: 09/06/20 2312     Blood Culture, Routine No growth after 5 days.    Gram stain [[id number]] Collected: 09/04/20 2010    Order Status: Completed Specimen: Foot, Left Updated: 09/04/20 2141     Gram Stain Result No WBC's      No organisms seen    Narrative:      1. Left 1st Metatarsal Bone Clean Margin    Gram stain [[id number]] Collected: 09/04/20 2010    Order Status: Completed Specimen: Foot, Left  Updated: 09/04/20 2138     Gram Stain Result Rare WBC's      No organisms seen    Narrative:      1. Left 1st Metatarsal Bone          Significant Imaging: I have reviewed all pertinent imaging results/findings within the past 24 hours.

## 2020-09-11 NOTE — ASSESSMENT & PLAN NOTE
s/p left 1st ray amputation 9/4 for osteomyelitis diagnosed based on MRI and overlying wounds. Clean margin positive for diphtheroids which may be contaminant and fingoldia from broth only, path w/o significant acute inflammation followup on path results.     - S/p partial 1st ray amputation  - Discontinue rocephin and start flagyl 500mg q 8 hours x 4 weeks  - patient informed of potential adverse effects of flagyl including disulfram reaction w/ alcohol and nausea  - ok for discharged from ID perspective  - f/u w/ podiatry outpatient  - will arrange ID outpatient f/u in about 2 weeks

## 2020-09-11 NOTE — DISCHARGE INSTRUCTIONS
[place] [facility]  Preventing the Spread of Coronavirus Disease 2019 in Homes and Residential Communities      Prevention steps for people with confirmed or suspected COVID-19 (including persons under investigation) who do not need to be hospitalized and people with confirmed COVID-19 who were hospitalized and determined to be medically stable to go home.    Your healthcare provider and public health staff will evaluate whether you can be cared for at home.   Stay home except to get medical care.   Separate yourself from other people and animals in your home   Call ahead before visiting your doctor.   Wear a facemask.   Cover your coughs and sneezes.   Clean your hands often.   Avoid sharing personal household items.   Clean all high-touch surfaces every day.   Monitor your symptoms. Seek prompt medical attention if your illness is worsening (e.g., difficulty breathing). Before seeking care, call your healthcare provider.   If you have a medical emergency and need to call 911, notify the dispatch personnel that you   have, or are being evaluated for COVID-19. If possible, put on a facemask before emergency   medical services arrive.   Discontinuing home isolation. You may discontinue home isolation on discharge to home.     Recommended precautions for household members, intimate partners, and caregivers in a nonhealthcare setting of a patient with symptomatic laboratory-confirmed COVID-19 or a patient under investigation.  Household members, intimate partners, and caregivers in a nonhealthcare setting may have close  contact with a person with symptomatic, laboratory-confirmed COVID-19 or a person under  investigation. Close contacts should monitor their health; they should call their healthcare provider right  away if they develop symptoms suggestive of COVID-19 (e.g., fever, cough, shortness of breath).    Close contacts should also follow these recommendations:   Make  sure that you understand and can help the patient follow their healthcare provider's instructions for medication(s) and care. You should help the patient with basic needs in the home and provide support for getting groceries, prescriptions, and other personal needs.   Monitor the patient's symptoms. If the patient is getting sicker, call his or her healthcare provider and tell them that the patient has laboratory-confirmed COVID-19. This will help the healthcare provider's office take steps to keep other people in the office or waiting room from getting infected. Ask the healthcare provider to call the local or [facility] health department for additional guidance. If the patient has a medical emergency and you need to call 911, notify the dispatch personnel that the patient has, or is being evaluated for COVID-19.   Household members should stay in another room or be  from the patient as much as possible. Household members should use a separate bedroom and bathroom, if available.   Prohibit visitors who do not have an essential need to be in the home.   Household members should care for any pets in the home. Do not handle pets or other animals while sick.   Make sure that shared spaces in the home have good air flow, such as by an air conditioner or an opened window, weather permitting.   Perform hand hygiene frequently. Wash your hands often with soap and water for at least 20 seconds or use an alcohol-based hand  that contains 60 to 95% alcohol, covering all surfaces of your hands and rubbing them together until they feel dry. Soap and water should be used preferentially if hands are visibly dirty.   Avoid touching your eyes, nose, and mouth with unwashed hands.   The patient should wear a facemask when you are around other people. If the patient is not able to wear a facemask (for example, because it causes trouble breathing), you, as the caregiver should wear a mask when you are in the same  room as the patient.   Wear a disposable facemask and gloves when you touch or have contact with the patient's blood, stool, or body fluids, such as saliva, sputum, nasal mucus, vomit, urine.  o Throw out disposable facemasks and gloves after using them. Do not reuse.  o When removing personal protective equipment, first remove and dispose of gloves. Then, immediately clean your hands with soap and water or alcohol-based hand . Next, remove and dispose of facemask, and immediately clean your hands again with soap and water or alcohol-based hand .   Avoid sharing household items with the patient. You should not share dishes, drinking glasses, cups, eating utensils, towels, bedding, or other items. After the patient uses these items, you should wash them thoroughly (see below Wash laundry thoroughly).   Clean all high-touch surfaces, such as counters, tabletops, doorknobs, bathroom fixtures, toilets, phones, keyboards, tablets, and bedside tables, every day. Also, clean any surfaces that may have blood, stool, or body fluids on them.   Use a household cleaning spray or wipe, according to the label instructions. Labels contain instructions for safe and effective use of the cleaning product including precautions you should take when applying the product, such as wearing gloves and making sure you have good ventilation during use of the product.   Wash laundry thoroughly.  o Immediately remove and wash clothes or bedding that have blood, stool, or body fluids on them.  o Wear disposable gloves while handling soiled items and keep soiled items away from your body. Clean your hands (with soap and water or an alcohol-based hand ) immediately after removing your gloves.  o Read and follow directions on labels of laundry or clothing items and detergent. In general, using a normal laundry detergent according to washing machine instructions and dry thoroughly using the warmest temperatures  recommended on the clothing label.   Place all used disposable gloves, facemasks, and other contaminated items in a lined container before disposing of them with other household waste. Clean your hands (with soap and water or an alcohol-based hand ) immediately after handling these items. Soap and water should be used preferentially if hands are visibly dirty.   Discuss any additional questions with your [facility] or local health department or healthcare provider. Check available hours when contacting your local health department.      -------------------------------------------------------------------------------------------------------------------------------------------        Instructions for Home Care of Patients and Caretakers with Coronavirus Disease 2019     Patients will be given one mask to take home with them if having symptoms of fever, cough, shortness of breath, and within 6 feet of others.   Limit visitors to the home.  Older persons and those that have chronic medical conditions such as diabetes, lung and heart disease are at increased risk for illness.    If possible, patients should use a separate bedroom while recovering. Caregivers and household members should avoid prolonged contact with the patient which means to stay 6 feet away and avoid contact with cough droplets.  When close contact is necessary, wash your hands before and immediately after contact.    Perform hand hygiene frequently. Wash your hands often with soap and water for at least 20 seconds or use an alcohol-based hand , covering all surfaces of your hands and rubbing them together until they feel dry.    Avoid touching your eyes, nose, and mouth with unwashed hands.   Avoid sharing household items with the patient. You should not share dishes, drinking glasses, cups, eating utensils, towels, bedding, or other items. After the patient uses these items, you should wash them thoroughly.   Wash laundry  thoroughly.   o Immediately remove and wash clothes or bedding that have blood, stool, or body fluids on them.   Clean all high-touch surfaces, such as counters, tabletops, doorknobs, bathroom fixtures, toilets, phones, keyboards, tablets, and bedside tables, every day.   o Use a household cleaning spray or wipe, according to the label instructions. Labels contain instructions for safe and effective use of the cleaning product including precautions you should take when applying the product, such as wearing gloves and making sure you have good ventilation during use of the product.    For more information see CDC link below.      https://www.cdc.gov/coronavirus/2019-ncov/hcp/guidance-prevent-spread.html#precautions

## 2020-09-11 NOTE — PROGRESS NOTES
"[facility] - ICU [facility] WT  Podiatry  Progress Note    Patient Name: Billy Sheffield Miguel  MRN: [id number]  Admission Date: 9/1/2020  Hospital Length of Stay: 10 days  Attending Physician: Cristina Capone MD  Primary Care Provider: BRAD Baker MD     Subjective:     Interval History: NAEON. Patient still reports feeling "great", eager to get out of hospital. Pathology report came back with no acute inflammation in clean margin, diphtheroids on clean margin were likely a contaminant and do not represent residual infection. Bradycardic within last 24 hours, WBCs WNL. No foot pain, no new pedal complaints. No strikethrough of dressings today.     Follow-up For: Procedure(s) (LRB):  INCISION AND DRAINAGE, FOOT (Left)  OSTEOTOMY, METATARSAL BONE, 1st METATARSAL RESECTION    Post-Operative Day: 7 Days Post-Op    Scheduled Meds:   aspirin  81 mg Oral Daily    atorvastatin  80 mg Oral Daily    carvediloL  12.5 mg Oral BID    cefTRIAXone (ROCEPHIN) IVPB  2 g Intravenous Q24H    enoxaparin  40 mg Subcutaneous Q24H    insulin aspart U-100  15 Units Subcutaneous TIDWM    insulin detemir U-100  55 Units Subcutaneous QHS    lisinopriL  40 mg Oral Daily     Continuous Infusions:  PRN Meds:acetaminophen, dextrose 50%, dextrose 50%, glucagon (human recombinant), glucose, glucose, insulin aspart U-100, melatonin, ondansetron, promethazine (PHENERGAN) IVPB, sodium chloride 0.9%    Review of Systems   Constitutional: Negative for chills and fever.   HENT: Negative for congestion, ear pain, rhinorrhea and sneezing.    Eyes: Negative for pain.   Respiratory: Positive for cough. Negative for shortness of breath.    Cardiovascular: Positive for leg swelling. Negative for chest pain.   Gastrointestinal: Negative for abdominal pain, constipation, diarrhea, nausea and vomiting (chronic).   Endocrine: Negative for polyuria.   Genitourinary: Negative for decreased urine volume, difficulty urinating, dysuria and flank pain. "   Musculoskeletal: Negative for back pain, neck pain and neck stiffness.   Skin: Positive for wound. Negative for rash.   Neurological: Positive for numbness. Negative for weakness and headaches.   Psychiatric/Behavioral: Negative for confusion. The patient is not nervous/anxious.      Objective:     Vital Signs (Most Recent):  Temp: (pt refused vs) (09/11/20 1700)  Pulse: (!) 51 (09/11/20 1146)  Resp: 18 (09/11/20 1146)  BP: 102/63 (09/11/20 1146)  SpO2: 99 % (09/11/20 1146) Vital Signs (24h Range):  Temp:  [96 °F (35.6 °C)-98 °F (36.7 °C)] 96 °F (35.6 °C)  Pulse:  [51-55] 51  Resp:  [18] 18  SpO2:  [97 %-99 %] 99 %  BP: (102-158)/(63-80) 102/63     Weight: 120.2 kg (265 lb)  Body mass index is 34.01 kg/m².    Foot Exam    General  Orientation: alert and oriented to person, place, and time   Affect: appropriate       Right Foot/Ankle     Inspection and Palpation  Ecchymosis: none  Tenderness: none   Swelling: none   Skin Exam: skin intact;     Neurovascular  Dorsalis pedis: absent  Posterior tibial: absent  Saphenous nerve sensation: diminished  Tibial nerve sensation: diminished  Superficial peroneal nerve sensation: diminished  Deep peroneal nerve sensation: diminished  Sural nerve sensation: diminished      Left Foot/Ankle      Inspection and Palpation  Ecchymosis: none  Tenderness: none   Swelling: (Periwound)  Skin Exam: maceration; no drainage, no cellulitis and no erythema (surgical wound)    Neurovascular  Dorsalis pedis: absent  Posterior tibial: absent  Saphenous nerve sensation: diminished  Tibial nerve sensation: diminished  Superficial peroneal nerve sensation: diminished  Deep peroneal nerve sensation: diminished  Sural nerve sensation: diminished            Laboratory:  CBC:   Recent Labs   Lab 09/11/20  0453   WBC 6.78   RBC 4.19*   HGB 11.9*   HCT 36.4*      MCV 87   MCH 28.4   MCHC 32.7     CMP:   Recent Labs   Lab 09/11/20 0453   *   CALCIUM 8.4*   ALBUMIN 2.7*   PROT 6.8   *    K 4.3   CO2 26      BUN 15   CREATININE 0.9   ALKPHOS 95   ALT 18   AST 15   BILITOT 0.3     CRP:   Recent Labs   Lab 09/10/20  0605   CRP 8.3*     ESR:   Recent Labs   Lab 09/10/20  0606   SEDRATE 84*     Microbiology Results (last 7 days)     Procedure Component Value Units Date/Time    Culture, Anaerobe [[id number]]  (Abnormal) Collected: 09/04/20 2010    Order Status: Completed Specimen: Foot, Left Updated: 09/11/20 1113     Anaerobic Culture FINEGOLDIA MAGNA  From broth only      Narrative:      1. Left 1st Metatarsal Bone Clean Margin    Fungus culture [[id number]] Collected: 09/04/20 2010    Order Status: Completed Specimen: Foot, Left Updated: 09/09/20 1353     Fungus (Mycology) Culture Culture in progress    Narrative:      1. Left 1st Metatarsal Bone Clean Margin    Fungus culture [[id number]] Collected: 09/04/20 2010    Order Status: Completed Specimen: Foot, Left Updated: 09/09/20 1353     Fungus (Mycology) Culture Culture in progress    Narrative:      1. Left 1st Metatarsal Bone    Culture, Anaerobe [[id number]] Collected: 09/04/20 2010    Order Status: Completed Specimen: Foot, Left Updated: 09/09/20 0718     Anaerobic Culture No anaerobes isolated    Narrative:      1. Left 1st Metatarsal Bone    Aerobic culture [[id number]]  (Abnormal) Collected: 09/04/20 2011    Order Status: Completed Specimen: Bone from Foot, Left Updated: 09/08/20 1004     Aerobic Bacterial Culture DIPHTHEROIDS  Rare      Narrative:      1. Left 1st Metatarsal Bone Clean Margin    Aerobic culture [[id number]]  (Abnormal) Collected: 09/04/20 2011    Order Status: Completed Specimen: Bone from Foot, Left Updated: 09/07/20 1413     Aerobic Bacterial Culture STREPTOCOCCUS AGALACTIAE (GROUP B)  Few  Beta-hemolytic streptococci are routinely susceptible to   penicillins,cephalosporins and carbapenems.  Susceptibility testing not routinely performed      Narrative:      1. Left 1st Metatarsal Bone    AFB Culture & Smear [[id number]]  Collected: 09/04/20 2010    Order Status: Completed Specimen: Foot, Left Updated: 09/07/20 1313     AFB Culture & Smear Culture in progress     AFB CULTURE STAIN No acid fast bacilli seen.    Narrative:      1. Left 1st Metatarsal Bone    AFB Culture & Smear [[id number]] Collected: 09/04/20 2010    Order Status: Completed Specimen: Foot, Left Updated: 09/07/20 1313     AFB Culture & Smear Culture in progress     AFB CULTURE STAIN No acid fast bacilli seen.    Narrative:      1. Left 1st Metatarsal Bone Clean Margin    Blood culture [[id number]] Collected: 09/01/20 2306    Order Status: Completed Specimen: Blood Updated: 09/07/20 0612     Blood Culture, Routine No growth after 5 days.    Blood culture [[id number]] Collected: 09/01/20 2211    Order Status: Completed Specimen: Blood from Peripheral, Antecubital, Right Updated: 09/06/20 2312     Blood Culture, Routine No growth after 5 days.    Gram stain [[id number]] Collected: 09/04/20 2010    Order Status: Completed Specimen: Foot, Left Updated: 09/04/20 2141     Gram Stain Result No WBC's      No organisms seen    Narrative:      1. Left 1st Metatarsal Bone Clean Margin    Gram stain [[id number]] Collected: 09/04/20 2010    Order Status: Completed Specimen: Foot, Left Updated: 09/04/20 2138     Gram Stain Result Rare WBC's      No organisms seen    Narrative:      1. Left 1st Metatarsal Bone        Specimen (12h ago, onward)    None          Diagnostic Results:  None    Clinical Findings:  No strikethrough today. Incision site coapted, sutures intact, no signs of dehiscence, no active drainage, no odor, no tenderness. Mild periwound edema, mild eperiwound erythema and dried blood. No fluid could be expressed on manual compression of the periwound area. Periwound macerated. Surgical wound appears to be healing as expected, skin flaps viable, not clinically infected.         Assessment/Plan:     * Acute osteomyelitis of left foot  A: s/p left partial 1st ray amputation  09/04 for osteomyelitis. POD#7 dressing change, healing as expected, not clinically infected. Vitals stable other than transient bradycardia. WBCs WNL. Clean margin from surgery positive for diphtheroids but pathology reported no acute inflammation so that diphtheroids were likely a contaminant and not representative of residual infection.     Plan:  -Dressing changed with betadine applied to macerated periwound.   -Antibiotic management per ID.   -Continue local wound care.   -Rest of care per primary  -Ready for discharge from podiatry perspective.   Discharge Instructions: Patient to followup with Dr Santana within 10 days of discharge, podiatry to arrange. Home health to change dressings 3x/week as follows: cleanse surgical wound with saline soaked gauze, apply betadine paint and aquacell AG, apply ABD pad, wrap with kerlix and ACE wrap. Weightbearing as tolerated with postop shoe at all times.         Josiah Saunders DPM PGY-1  Podiatric Medicine & Surgery  [facility]

## 2020-09-11 NOTE — SUBJECTIVE & OBJECTIVE
"  Subjective:     Interval History: NAEON. Patient still reports feeling "great", eager to get out of hospital. Pathology report came back with no acute inflammation in clean margin, diphtheroids on clean margin were likely a contaminant and do not represent residual infection. Bradycardic within last 24 hours, WBCs WNL. No foot pain, no new pedal complaints. No strikethrough of dressings today.     Follow-up For: Procedure(s) (LRB):  INCISION AND DRAINAGE, FOOT (Left)  OSTEOTOMY, METATARSAL BONE, 1st METATARSAL RESECTION    Post-Operative Day: 7 Days Post-Op    Scheduled Meds:   aspirin  81 mg Oral Daily    atorvastatin  80 mg Oral Daily    carvediloL  12.5 mg Oral BID    cefTRIAXone (ROCEPHIN) IVPB  2 g Intravenous Q24H    enoxaparin  40 mg Subcutaneous Q24H    insulin aspart U-100  15 Units Subcutaneous TIDWM    insulin detemir U-100  55 Units Subcutaneous QHS    lisinopriL  40 mg Oral Daily     Continuous Infusions:  PRN Meds:acetaminophen, dextrose 50%, dextrose 50%, glucagon (human recombinant), glucose, glucose, insulin aspart U-100, melatonin, ondansetron, promethazine (PHENERGAN) IVPB, sodium chloride 0.9%    Review of Systems   Constitutional: Negative for chills and fever.   HENT: Negative for congestion, ear pain, rhinorrhea and sneezing.    Eyes: Negative for pain.   Respiratory: Positive for cough. Negative for shortness of breath.    Cardiovascular: Positive for leg swelling. Negative for chest pain.   Gastrointestinal: Negative for abdominal pain, constipation, diarrhea, nausea and vomiting (chronic).   Endocrine: Negative for polyuria.   Genitourinary: Negative for decreased urine volume, difficulty urinating, dysuria and flank pain.   Musculoskeletal: Negative for back pain, neck pain and neck stiffness.   Skin: Positive for wound. Negative for rash.   Neurological: Positive for numbness. Negative for weakness and headaches.   Psychiatric/Behavioral: Negative for confusion. The patient is " not nervous/anxious.      Objective:     Vital Signs (Most Recent):  Temp: (pt refused vs) (09/11/20 1700)  Pulse: (!) 51 (09/11/20 1146)  Resp: 18 (09/11/20 1146)  BP: 102/63 (09/11/20 1146)  SpO2: 99 % (09/11/20 1146) Vital Signs (24h Range):  Temp:  [96 °F (35.6 °C)-98 °F (36.7 °C)] 96 °F (35.6 °C)  Pulse:  [51-55] 51  Resp:  [18] 18  SpO2:  [97 %-99 %] 99 %  BP: (102-158)/(63-80) 102/63     Weight: 120.2 kg (265 lb)  Body mass index is 34.01 kg/m².    Foot Exam    General  Orientation: alert and oriented to person, place, and time   Affect: appropriate       Right Foot/Ankle     Inspection and Palpation  Ecchymosis: none  Tenderness: none   Swelling: none   Skin Exam: skin intact;     Neurovascular  Dorsalis pedis: absent  Posterior tibial: absent  Saphenous nerve sensation: diminished  Tibial nerve sensation: diminished  Superficial peroneal nerve sensation: diminished  Deep peroneal nerve sensation: diminished  Sural nerve sensation: diminished      Left Foot/Ankle      Inspection and Palpation  Ecchymosis: none  Tenderness: none   Swelling: (Periwound)  Skin Exam: maceration; no drainage, no cellulitis and no erythema (surgical wound)    Neurovascular  Dorsalis pedis: absent  Posterior tibial: absent  Saphenous nerve sensation: diminished  Tibial nerve sensation: diminished  Superficial peroneal nerve sensation: diminished  Deep peroneal nerve sensation: diminished  Sural nerve sensation: diminished            Laboratory:  CBC:   Recent Labs   Lab 09/11/20  0453   WBC 6.78   RBC 4.19*   HGB 11.9*   HCT 36.4*      MCV 87   MCH 28.4   MCHC 32.7     CMP:   Recent Labs   Lab 09/11/20  0453   *   CALCIUM 8.4*   ALBUMIN 2.7*   PROT 6.8   *   K 4.3   CO2 26      BUN 15   CREATININE 0.9   ALKPHOS 95   ALT 18   AST 15   BILITOT 0.3     CRP:   Recent Labs   Lab 09/10/20  0605   CRP 8.3*     ESR:   Recent Labs   Lab 09/10/20  0606   SEDRATE 84*     Microbiology Results (last 7 days)      Procedure Component Value Units Date/Time    Culture, Anaerobe [[id number]]  (Abnormal) Collected: 09/04/20 2010    Order Status: Completed Specimen: Foot, Left Updated: 09/11/20 1113     Anaerobic Culture FINEGOLDIA MAGNA  From broth only      Narrative:      1. Left 1st Metatarsal Bone Clean Margin    Fungus culture [[id number]] Collected: 09/04/20 2010    Order Status: Completed Specimen: Foot, Left Updated: 09/09/20 1353     Fungus (Mycology) Culture Culture in progress    Narrative:      1. Left 1st Metatarsal Bone Clean Margin    Fungus culture [[id number]] Collected: 09/04/20 2010    Order Status: Completed Specimen: Foot, Left Updated: 09/09/20 1353     Fungus (Mycology) Culture Culture in progress    Narrative:      1. Left 1st Metatarsal Bone    Culture, Anaerobe [[id number]] Collected: 09/04/20 2010    Order Status: Completed Specimen: Foot, Left Updated: 09/09/20 0718     Anaerobic Culture No anaerobes isolated    Narrative:      1. Left 1st Metatarsal Bone    Aerobic culture [[id number]]  (Abnormal) Collected: 09/04/20 2011    Order Status: Completed Specimen: Bone from Foot, Left Updated: 09/08/20 1004     Aerobic Bacterial Culture DIPHTHEROIDS  Rare      Narrative:      1. Left 1st Metatarsal Bone Clean Margin    Aerobic culture [[id number]]  (Abnormal) Collected: 09/04/20 2011    Order Status: Completed Specimen: Bone from Foot, Left Updated: 09/07/20 1413     Aerobic Bacterial Culture STREPTOCOCCUS AGALACTIAE (GROUP B)  Few  Beta-hemolytic streptococci are routinely susceptible to   penicillins,cephalosporins and carbapenems.  Susceptibility testing not routinely performed      Narrative:      1. Left 1st Metatarsal Bone    AFB Culture & Smear [[id number]] Collected: 09/04/20 2010    Order Status: Completed Specimen: Foot, Left Updated: 09/07/20 1313     AFB Culture & Smear Culture in progress     AFB CULTURE STAIN No acid fast bacilli seen.    Narrative:      1. Left 1st Metatarsal Bone    AFB Culture &  Smear [[id number]] Collected: 09/04/20 2010    Order Status: Completed Specimen: Foot, Left Updated: 09/07/20 1313     AFB Culture & Smear Culture in progress     AFB CULTURE STAIN No acid fast bacilli seen.    Narrative:      1. Left 1st Metatarsal Bone Clean Margin    Blood culture [[id number]] Collected: 09/01/20 2306    Order Status: Completed Specimen: Blood Updated: 09/07/20 0612     Blood Culture, Routine No growth after 5 days.    Blood culture [[id number]] Collected: 09/01/20 2211    Order Status: Completed Specimen: Blood from Peripheral, Antecubital, Right Updated: 09/06/20 2312     Blood Culture, Routine No growth after 5 days.    Gram stain [[id number]] Collected: 09/04/20 2010    Order Status: Completed Specimen: Foot, Left Updated: 09/04/20 2141     Gram Stain Result No WBC's      No organisms seen    Narrative:      1. Left 1st Metatarsal Bone Clean Margin    Gram stain [[id number]] Collected: 09/04/20 2010    Order Status: Completed Specimen: Foot, Left Updated: 09/04/20 2138     Gram Stain Result Rare WBC's      No organisms seen    Narrative:      1. Left 1st Metatarsal Bone        Specimen (12h ago, onward)    None          Diagnostic Results:  None    Clinical Findings:  No strikethrough today. Incision site coapted, sutures intact, no signs of dehiscence, no active drainage, no odor, no tenderness. Mild periwound edema, mild eperiwound erythema and dried blood. No fluid could be expressed on manual compression of the periwound area. Periwound macerated. Surgical wound appears to be healing as expected, skin flaps viable, not clinically infected.

## 2020-09-11 NOTE — PLAN OF CARE
Problem: Fall Injury Risk  Goal: Absence of Fall and Fall-Related Injury  Intervention: Identify and Manage Contributors to Fall Injury Risk  Flowsheets (Taken 9/11/2020 0522)  Self-Care Promotion: independence encouraged  Medication Review/Management: medications reviewed  Intervention: Promote Injury-Free Environment  Flowsheets (Taken 9/11/2020 0522)  Safety Promotion/Fall Prevention:   bed alarm set   assistive device/personal item within reach     Problem: Adult Inpatient Plan of Care  Goal: Absence of Hospital-Acquired Illness or Injury  Intervention: Identify and Manage Fall Risk  Flowsheets (Taken 9/11/2020 0522)  Safety Promotion/Fall Prevention:   bed alarm set   assistive device/personal item within reach  Intervention: Prevent VTE (venous thromboembolism)  Flowsheets (Taken 9/11/2020 0522)  VTE Prevention/Management: remove, assess skin and reapply sequential compression device     Problem: Diabetes Comorbidity  Goal: Blood Glucose Level Within Desired Range  Intervention: Maintain Glycemic Control  Flowsheets (Taken 9/11/2020 0522)  Glycemic Management: blood glucose monitoring    Pt in bed, easily aroused via verbal stimuli.  No acute distress noted.  Afebrile.  No SOB noted at this time.  Spo2 ranging in the mid to high 90s on room air.  Pt continues to refuses to wear bedside tele monitor.  Pt also very non compliant when it comes to diet.  Pt had several snacks before bed and educated on  the effects of hyperglycemia on body.  Pt verbalized understanding.  Assisted pt with bedtime care.  Bed to lowest limit, call light in hand, No complaints voiced at this time.

## 2020-09-11 NOTE — PROGRESS NOTES
[facility] - ICU 15 WT  Infectious Disease  Progress Note    Patient Name: Billy Sheffield Miguel  MRN: [id number]  Admission Date: 9/1/2020  Length of Stay: 10 days  Attending Physician: Cristina Capone MD  Primary Care Provider: BRAD Baker MD    Isolation Status: Airborne and Contact and Droplet  Assessment/Plan:      * Acute osteomyelitis of left foot  s/p left 1st ray amputation 9/4 for osteomyelitis diagnosed based on MRI and overlying wounds. Clean margin positive for diphtheroids which may be contaminant and fingoldia from broth only, path w/o significant acute inflammation followup on path results.     - S/p partial 1st ray amputation  - Discontinue rocephin and start flagyl 500mg q 8 hours x 4 weeks  - patient informed of potential adverse effects of flagyl including disulfram reaction w/ alcohol and nausea  - ok for discharged from ID perspective  - f/u w/ podiatry outpatient  - will arrange ID outpatient f/u in about 2 weeks      Thank you for your consult. I will sign off. Please contact us if you have any additional questions.    Shan Soto MD  Infectious Disease  [facility] 15 [facility]    Subjective:     Principal Problem:Acute osteomyelitis of left foot    HPI: 54 yo M with Hx of DM2, osteomyelitis of L 1st digit (s/p amputation 07/04), PVD, CAD (past MI), HTN, HLD sent by podiatry clinic due to suspected infection at amputation site. Patient has had delayed healing of the surgical wound despite multiple courses of PO antibiotics. Cultures from 07/04 grew Prevotella for which he was prescribed doxycycline and metronidazole. The wound ultimately dehisced and repeat cultures from 07/28 grew Strep agalactiae for which he was prescribed clindamycin. Yesterday at followup podiatry appointment worsening purulence and erythema were noted, patient was sent to ED. Patient denies pain at the amputation site, though he admits to diabetic neuropathy. He tested positive for  COVID-19 with preadmission screen, only symptom is dry cough of 1 month duration.   No new subjective & objective note has been filed under this hospital service since the last note was generated.

## 2020-09-11 NOTE — SUBJECTIVE & OBJECTIVE
Interval History: Afebrile. Path from OR negative for significant acute inflammation. Clean margin positive for diphthroids and finegoldia from broth only. Patient eager to go home.    Review of Systems   Constitutional: Negative for fever.   HENT: Negative for congestion.    Eyes: Negative for photophobia.   Respiratory: Negative for shortness of breath.    Cardiovascular: Positive for leg swelling.   Genitourinary: Negative for difficulty urinating.   Skin: Positive for wound. Negative for rash.   Neurological: Negative for weakness.   Psychiatric/Behavioral: Negative for confusion.     Objective:     Vital Signs (Most Recent):  Temp: (pt refused vs) (09/11/20 1700)  Pulse: (!) 51 (09/11/20 1146)  Resp: 18 (09/11/20 1146)  BP: 102/63 (09/11/20 1146)  SpO2: 99 % (09/11/20 1146) Vital Signs (24h Range):  Temp:  [96 °F (35.6 °C)-98 °F (36.7 °C)] 96 °F (35.6 °C)  Pulse:  [51-55] 51  Resp:  [18] 18  SpO2:  [97 %-99 %] 99 %  BP: (102-158)/(63-80) 102/63     Weight: 120.2 kg (265 lb)  Body mass index is 34.01 kg/m².    Estimated Creatinine Clearance: 126.3 mL/min (based on SCr of 0.9 mg/dL).    Physical Exam  Vitals signs reviewed.   Constitutional:       Appearance: Normal appearance. He is normal weight.   HENT:      Head: Normocephalic and atraumatic.   Eyes:      General:         Right eye: No discharge.         Left eye: No discharge.      Extraocular Movements: Extraocular movements intact.      Conjunctiva/sclera: Conjunctivae normal.   Pulmonary:      Effort: Pulmonary effort is normal. No respiratory distress.   Chest:      Chest wall: No tenderness.   Abdominal:      Tenderness: There is no abdominal tenderness. There is no guarding.   Musculoskeletal:         General: No tenderness.      Comments: Left 1st ray amputation   Skin:     General: Skin is warm and dry.      Comments: Post-op dressing wrapped   Neurological:      Mental Status: He is alert and oriented to person, place, and time. Mental status is at  baseline.   Psychiatric:         Mood and Affect: Mood normal.         Behavior: Behavior normal.         Significant Labs:   Blood Culture:   Recent Labs   Lab 07/03/20  1450 07/03/20  1541 07/03/20  2020 09/01/20  2211 09/01/20  2306   LABBLOO No growth after 5 days. No growth after 5 days. No growth after 5 days.  No growth after 5 days. No growth after 5 days. No growth after 5 days.     CBC:   Recent Labs   Lab 09/10/20  0606 09/11/20  0453   WBC 6.70 6.78   HGB 12.4* 11.9*   HCT 37.0* 36.4*    300     CMP:   Recent Labs   Lab 09/10/20  0605 09/11/20  0453   * 132*   K 4.4 4.3    100   CO2 25 26   * 306*   BUN 15 15   CREATININE 0.9 0.9   CALCIUM 8.7 8.4*   PROT 6.9 6.8   ALBUMIN 2.7* 2.7*   BILITOT 0.3 0.3   ALKPHOS 95 95   AST 14 15   ALT 20 18   ANIONGAP 7* 6*   EGFRNONAA >60.0 >60.0     Microbiology Results (last 7 days)     Procedure Component Value Units Date/Time    Culture, Anaerobe [[id number]]  (Abnormal) Collected: 09/04/20 2010    Order Status: Completed Specimen: Foot, Left Updated: 09/11/20 1113     Anaerobic Culture ARNOLDIA MAGNA  From broth only      Narrative:      1. Left 1st Metatarsal Bone Clean Margin    Fungus culture [[id number]] Collected: 09/04/20 2010    Order Status: Completed Specimen: Foot, Left Updated: 09/09/20 1353     Fungus (Mycology) Culture Culture in progress    Narrative:      1. Left 1st Metatarsal Bone Clean Margin    Fungus culture [[id number]] Collected: 09/04/20 2010    Order Status: Completed Specimen: Foot, Left Updated: 09/09/20 1353     Fungus (Mycology) Culture Culture in progress    Narrative:      1. Left 1st Metatarsal Bone    Culture, Anaerobe [[id number]] Collected: 09/04/20 2010    Order Status: Completed Specimen: Foot, Left Updated: 09/09/20 0718     Anaerobic Culture No anaerobes isolated    Narrative:      1. Left 1st Metatarsal Bone    Aerobic culture [[id number]]  (Abnormal) Collected: 09/04/20 2011    Order Status: Completed  Specimen: Bone from Foot, Left Updated: 09/08/20 1004     Aerobic Bacterial Culture DIPHTHEROIDS  Rare      Narrative:      1. Left 1st Metatarsal Bone Clean Margin    Aerobic culture [[id number]]  (Abnormal) Collected: 09/04/20 2011    Order Status: Completed Specimen: Bone from Foot, Left Updated: 09/07/20 1413     Aerobic Bacterial Culture STREPTOCOCCUS AGALACTIAE (GROUP B)  Few  Beta-hemolytic streptococci are routinely susceptible to   penicillins,cephalosporins and carbapenems.  Susceptibility testing not routinely performed      Narrative:      1. Left 1st Metatarsal Bone    AFB Culture & Smear [[id number]] Collected: 09/04/20 2010    Order Status: Completed Specimen: Foot, Left Updated: 09/07/20 1313     AFB Culture & Smear Culture in progress     AFB CULTURE STAIN No acid fast bacilli seen.    Narrative:      1. Left 1st Metatarsal Bone    AFB Culture & Smear [[id number]] Collected: 09/04/20 2010    Order Status: Completed Specimen: Foot, Left Updated: 09/07/20 1313     AFB Culture & Smear Culture in progress     AFB CULTURE STAIN No acid fast bacilli seen.    Narrative:      1. Left 1st Metatarsal Bone Clean Margin    Blood culture [[id number]] Collected: 09/01/20 2306    Order Status: Completed Specimen: Blood Updated: 09/07/20 0612     Blood Culture, Routine No growth after 5 days.    Blood culture [[id number]] Collected: 09/01/20 2211    Order Status: Completed Specimen: Blood from Peripheral, Antecubital, Right Updated: 09/06/20 2312     Blood Culture, Routine No growth after 5 days.    Gram stain [[id number]] Collected: 09/04/20 2010    Order Status: Completed Specimen: Foot, Left Updated: 09/04/20 2141     Gram Stain Result No WBC's      No organisms seen    Narrative:      1. Left 1st Metatarsal Bone Clean Margin    Gram stain [[id number]] Collected: 09/04/20 2010    Order Status: Completed Specimen: Foot, Left Updated: 09/04/20 2138     Gram Stain Result Rare WBC's      No organisms seen    Narrative:       1. Left 1st Metatarsal Bone          Significant Imaging: I have reviewed all pertinent imaging results/findings within the past 24 hours.

## 2020-09-11 NOTE — ASSESSMENT & PLAN NOTE
A: s/p left partial 1st ray amputation 09/04 for osteomyelitis. POD#7 dressing change, healing as expected, not clinically infected. Vitals stable other than transient bradycardia. WBCs WNL. Clean margin from surgery positive for diphtheroids but pathology reported no acute inflammation so that diphtheroids were likely a contaminant and not representative of residual infection.     Plan:  -Dressing changed with betadine applied to macerated periwound.   -Antibiotic management per ID.   -Continue local wound care.   -Rest of care per primary  -Ready for discharge from podiatry perspective.   Discharge Instructions: Patient to followup with Dr Santana within 10 days of discharge, podiatry to arrange. Home health to change dressings 3x/week as follows: cleanse surgical wound with saline soaked gauze, apply betadine paint and aquacell AG, apply ABD pad, wrap with kerlix and ACE wrap. Weightbearing as tolerated with postop shoe at all times.

## 2020-09-14 ENCOUNTER — TELEPHONE (OUTPATIENT)
Dept: PODIATRY | Facility: CLINIC | Age: 55
End: 2020-09-14

## 2020-09-14 ENCOUNTER — PATIENT OUTREACH (OUTPATIENT)
Dept: ADMINISTRATIVE | Facility: CLINIC | Age: 55
End: 2020-09-14

## 2020-09-14 DIAGNOSIS — Z98.890 POST-OPERATIVE STATE: Primary | ICD-10-CM

## 2020-09-14 NOTE — TELEPHONE ENCOUNTER
Call placed to patient to notify hime that [facility] unable to staff HH request & [facility] does not accept his insurance. Requested that he contact his insurance carrier and ask them which HH agency is in his network and let us know so we can send them HH orders.

## 2020-09-14 NOTE — PROGRESS NOTES
"  Subjective:      Patient ID: Billy Rivera is a 55 y.o. male.    Chief Complaint: Foot Ulcer and Diabetes Mellitus (brown, 8/11/2020)    Pt presents today s/p left hallux amputation. Pt states he has kept his foot clean and bandaged. Pt denies any NVFCSOB. Pt states "he feels fine"      Review of Systems   Constitution: Negative for chills, fever and malaise/fatigue.   HENT: Negative for hearing loss.    Cardiovascular: Positive for leg swelling. Negative for claudication.   Respiratory: Negative for shortness of breath.    Skin: Negative for flushing and rash.   Musculoskeletal: Negative for joint pain and myalgias.   Neurological: Positive for numbness, paresthesias and sensory change. Negative for loss of balance.   Psychiatric/Behavioral: Negative for altered mental status.           Objective:       Vitals:    09/01/20 1419   BP: 135/79   Pulse: 78   Temp: 98.3 °F (36.8 °C)   Weight: 120.7 kg (266 lb 1.5 oz)   Height: 6' 2" (1.88 m)   PainSc: 0-No pain        Physical Exam  Vitals signs reviewed.   Cardiovascular:      Pulses:           Dorsalis pedis pulses are 1+ on the right side and 1+ on the left side.        Posterior tibial pulses are 1+ on the right side and 1+ on the left side.      Comments: Localized edema noted to surgical site  Musculoskeletal:      Comments: Left hallux amp   Skin:     General: Skin is warm.      Comments: Ulcer location: 1.3x 1.7x 0.3cm  left, hallux amp site  Streaking running all the way up the leg to knee  Purulence noted from wound  Swelling noted to left foot.    Neurological:      Mental Status: He is alert and oriented to person, place, and time.      Comments: Absent gross sensation b/L   Psychiatric:         Mood and Affect: Mood normal.                   Assessment:       Encounter Diagnoses   Name Primary?    Cellulitis, unspecified cellulitis site - Left Foot Yes    Infected wound - Left Foot          Plan:       Billy was seen today for foot ulcer and " diabetes mellitus.    Diagnoses and all orders for this visit:    Cellulitis, unspecified cellulitis site - Left Foot    Infected wound - Left Foot      I counseled the patient on his conditions, their implications and medical management.    Debridement: no debridment done    Pt was sent to the ED for workup for osteomyelitis/cellulitis.

## 2020-09-14 NOTE — PATIENT INSTRUCTIONS
Discharge Instructions for Osteomyelitis  You have a condition called osteomyelitis. This is a bone infection caused by bacteria or fungi. The infection may have come from one area of your body and spread to the bone by traveling through the blood. Osteomyelitis is described as acute when the infection is new, and chronic when you have had the infection for a longer time. If you have any questions or concerns, call your healthcare provider.  Home care  · Take your medicine exactly as directed. If you were given antibiotics or antifungal medicine, make sure you finish the prescription--even if you feel better. If you dont finish the medicine, the infection may return and may make future infections harder to treat.  · Be careful not to injure the area where you have the infection.  · Carefully follow all instructions for taking care of any wounds.  · Use a splint, sling, or brace as directed by your healthcare provider.  Follow-up care  Make a follow-up appointment as directed by our staff.  When to seek medical care  Call your healthcare provider if you have any of the following:  · Increasing pain, redness, swelling, or drainage in the infected area  · Fever 100.4°F (38°C) or greater, or as advised  · Increasing fatigue or feeling tired     © 2000-2020 [employer]. [address] [place] [address], [place] [address]. All rights reserved. This information is not intended as a substitute for professional medical care. Always follow your healthcare professional's instructions.

## 2020-09-15 ENCOUNTER — TELEPHONE (OUTPATIENT)
Dept: PODIATRY | Facility: CLINIC | Age: 55
End: 2020-09-15

## 2020-09-15 NOTE — TELEPHONE ENCOUNTER
Spoke with patient after speaking with [facility] rep, they will admit him tomorrow to their service.

## 2020-09-17 ENCOUNTER — PATIENT OUTREACH (OUTPATIENT)
Dept: ADMINISTRATIVE | Facility: OTHER | Age: 55
End: 2020-09-17

## 2020-09-18 ENCOUNTER — OFFICE VISIT (OUTPATIENT)
Dept: PODIATRY | Facility: CLINIC | Age: 55
End: 2020-09-18
Payer: COMMERCIAL

## 2020-09-18 VITALS — SYSTOLIC BLOOD PRESSURE: 137 MMHG | HEART RATE: 58 BPM | DIASTOLIC BLOOD PRESSURE: 76 MMHG

## 2020-09-18 DIAGNOSIS — Z98.890 POST-OPERATIVE STATE: Primary | ICD-10-CM

## 2020-09-18 PROCEDURE — 99024 POSTOP FOLLOW-UP VISIT: CPT | Mod: S$GLB,,, | Performed by: PODIATRIST

## 2020-09-18 PROCEDURE — 99999 PR PBB SHADOW E&M-EST. PATIENT-LVL III: CPT | Mod: PBBFAC,,, | Performed by: PODIATRIST

## 2020-09-18 PROCEDURE — 99024 PR POST-OP FOLLOW-UP VISIT: ICD-10-PCS | Mod: S$GLB,,, | Performed by: PODIATRIST

## 2020-09-18 PROCEDURE — 99999 PR PBB SHADOW E&M-EST. PATIENT-LVL III: ICD-10-PCS | Mod: PBBFAC,,, | Performed by: PODIATRIST

## 2020-09-18 NOTE — DISCHARGE SUMMARY
[facility] - ICU [facility] Medicine  Discharge Summary      Patient Name: Billy Sheffield Miguel  MRN: [id number]  Admission Date: 9/1/2020  Hospital Length of Stay: 10 days  Discharge Date and Time: 9/11/2020  6:40 PM  Attending Physician: No att. providers found   Discharging Provider: Cristina Capone MD  Primary Care Provider: BRAD Baker MD    Virtual Telemedicine Progress Note  Chief complaint: Acute osteomyelitis of left foot  The patient location is: [id number]  The patient arrived at: 9/1/2020  3:22 PM  Present with the patient at the time of the telemed/virtual assessment: n/a  Total time spent with patient: 10 min  I have assessed findings virtually using a telemedicine platform and with assistance of the bedside nurse or telemedicine presenter.  The attending portion of this evaluation, treatment, and documentation was performed per Cristina Capone MD via audiovisual.     Patient was transferred to the telemedicine service on: 9/6/2020    HPI:   56 yo M with PMHx DM type 2, osteomyelitis of L 1st toe s/p amputation, PVD, CAD, HLD, and HTN who presents from podiatry clinic for worsening purulence and erythema at site of previous amputation. The patient underwent amputation of L 1st toe 7/4. Since then, he has been on multiple courses of abx, and he has had delayed wound healing. He sees podiatry every Tuesday for wound care. Today on evaluation, they noted significant purulence at the wound site and worsening surrounding erythema and warmth. He was referred to the ED for further management. Pt. Denies pain as he has veery little feeling in his feet. He denies any fevers, chills, nausea, vomiting, diarrhea or malaise. He reports feeling his normal self. He tested positive for COVID-19 during routine screening while in the ED. On further questioning, he does report a dry cough for about a month now. He denies any other respiratory symptoms or changes in taste/smell. No known sick  contacts.    Procedure(s) (LRB):  INCISION AND DRAINAGE, FOOT (Left)  OSTEOTOMY, METATARSAL BONE, 1st METATARSAL RESECTION      Hospital Course:   Diabetic Foot Infection - Left Foot Osteomyelitis  -polymicrobial wound culture - pan-sensitive, Klebsiella and Strept group B  -continue ceftriaxone  -Podiatry_Vascular Surgery_infectious disease consults following.   -s/p left partial 1st ray amputation 09/04 for osteomyelitis  -post-op culture with group B strep/diphtheroids and biopsy results pending; ID following  -clean margins with pathology so will complete flagyl for 6 weeks  -football dressing placed by podiatry on discharge and he will f/u with them as scheduled      COVID-19 detected  - COVID-19 testing: positive on 9/1  - Isolation: Airborne/Droplet. Surgical mask on patient. Notify Infection Control  - Diagnostics: Trend Q48hrs if stable, more frequently if patient decompensating     Mostly asymptomatic      Diabetes mellitus with insulin therapy - uncontrolled  - Hgb A1C 11.5 8/2020 - at risk for poor wound healing  - Home medication: Glargine 55 U HS and metformin 1 g BID, humulin 20 TID WM  - basal and bolus insulin doses increased 9/6  - Aspart increased 9/8; basal insulin increased slightly for 9/9, 9/10  - dietician has counseled and ambulatory endocrine referral sent     Dyslipidemia associated with type 2 diabetes mellitus  - Continue home medication Atorvastatin     PVD (peripheral vascular disease)  - Continue home medications ASA and atorvastatin     Coronary artery disease involving native coronary artery of native heart without angina pectoris  -No acute issues  -Continue home ASA, statin     HTN (hypertension)  -Continue home meds: carvedilol 12.5 and lisinopril 40mg    Consults:   Consults (From admission, onward)        Status Ordering Provider     Inpatient consult to Infectious Diseases  Once     Provider:  (Not yet assigned)    Completed LUYC DAVALOS     Inpatient consult to Podiatry   Once     Provider:  (Not yet assigned)    Completed LUCY DAVALOS     Inpatient consult to Vascular Surgery  Once     Provider:  (Not yet assigned)    Completed ELILA JONES     Inpatient virtual consult to Hospital Medicine  Once     Provider:  (Not yet assigned)    Completed MART BANUELOS          Final Active Diagnoses:    Diagnosis Date Noted POA    PRINCIPAL PROBLEM:  Acute osteomyelitis of left foot [M86.172] 09/02/2020 Yes    Coronavirus infection [B34.2]  Yes    Diabetic foot infection [E11.628, L08.9] 09/01/2020 Yes    COVID-19 virus detected [U07.1] 09/01/2020 Yes    PVD (peripheral vascular disease) [I73.9] 06/15/2015 Yes    Uncontrolled type II diabetes mellitus [E11.65] 06/15/2015 Yes    Coronary artery disease involving native coronary artery of native heart without angina pectoris [I25.10] 06/25/2013 Yes    Dyslipidemia [E78.5] 05/22/2013 Yes    Essential hypertension [I10] 09/27/2012 Yes      Problems Resolved During this Admission:      Discharged Condition: stable    Disposition: Home or Self Care    Follow Up:    Patient Instructions:      Ambulatory referral/consult to Endocrinology   Standing Status: Future   Referral Priority: Routine Referral Type: Consultation   Requested Specialty: Endocrinology   Number of Visits Requested: 1     Ambulatory referral/consult to Nutrition Services   Standing Status: Future   Referral Priority: Routine Referral Type: Consultation   Referral Reason: Specialty Services Required   Requested Specialty: Nutrition   Number of Visits Requested: 1     Ambulatory referral/consult to Podiatry   Standing Status: Future   Referral Priority: Routine Referral Type: Consultation   Referral Reason: Specialty Services Required   Requested Specialty: Podiatry   Number of Visits Requested: 1     Diet diabetic     Diet Cardiac     Notify your health care provider if you experience any of the following:  temperature >100.4     Notify your health care provider  "if you experience any of the following:  persistent nausea and vomiting or diarrhea     Notify your health care provider if you experience any of the following:  severe uncontrolled pain     Notify your health care provider if you experience any of the following:  difficulty breathing or increased cough     Notify your health care provider if you experience any of the following:  severe persistent headache     Notify your health care provider if you experience any of the following:  worsening rash     Notify your health care provider if you experience any of the following:  persistent dizziness, light-headedness, or visual disturbances     Notify your health care provider if you experience any of the following:  increased confusion or weakness     Activity as tolerated     Weight bearing restrictions (specify):   Order Comments: Ambulate with boot to left foot.     Medications:  Reconciled Home Medications:      Medication List      START taking these medications    metroNIDAZOLE 500 MG tablet  Commonly known as: FLAGYL  Take 1 tablet (500 mg total) by mouth every 8 (eight) hours.        CHANGE how you take these medications    pen needle, diabetic 31 gauge x 3/16" Ndle  Inject 1 each into the skin 3 (three) times daily before meals.  What changed: when to take this        CONTINUE taking these medications    aspirin 81 MG EC tablet  Commonly known as: ECOTRIN  Take 1 tablet (81 mg total) by mouth once daily.     atorvastatin 80 MG tablet  Commonly known as: LIPITOR  Take 1 tablet (80 mg total) by mouth once daily.     BIOTIN ORAL  Take 1 capsule by mouth once daily.     blood sugar diagnostic Strp  Strips and lancets    Use before meals and bedtime     carvediloL 12.5 MG tablet  Commonly known as: COREG  Take 1 tablet (12.5 mg total) by mouth 2 (two) times daily.     insulin glargine 100 unit/mL injection  Commonly known as: LANTUS  Inject 55 Units into the skin every evening.     insulin regular 100 unit/mL " injection  Inject 20 Units into the skin 3 (three) times daily before meals.     lisinopriL 40 MG tablet  Commonly known as: PRINIVIL,ZESTRIL  Take 1 tablet (40 mg total) by mouth once daily.     metFORMIN 1000 MG tablet  Commonly known as: GLUCOPHAGE  Take 1 tablet (1,000 mg total) by mouth 2 (two) times daily with meals.            Significant Diagnostic Studies: as above    Pending Diagnostic Studies:     None        Indwelling Lines/Drains at time of discharge:   Lines/Drains/Airways     None                 Time spent on the discharge of patient: 40 minutes  Patient was seen and examined on the date of discharge and determined to be suitable for discharge.         Cristina Capone MD  Department of Hospital Medicine  [facility] - ICU [facility]

## 2020-09-21 ENCOUNTER — TELEPHONE (OUTPATIENT)
Dept: PODIATRY | Facility: CLINIC | Age: 55
End: 2020-09-21

## 2020-09-21 NOTE — TELEPHONE ENCOUNTER
Spoke with patient regarding his request for his medical records informed patient that he would have to call medical records for them. ----- Message from Nas Vazquez sent at 9/21/2020  2:52 PM CDT -----  Contact: self  Pt would like his medical records sent to [employer] for his short term disability     Please Call     Contact  [phone]

## 2020-09-24 ENCOUNTER — TELEPHONE (OUTPATIENT)
Dept: INTERNAL MEDICINE | Facility: CLINIC | Age: 55
End: 2020-09-24

## 2020-09-24 ENCOUNTER — OFFICE VISIT (OUTPATIENT)
Dept: INTERNAL MEDICINE | Facility: CLINIC | Age: 55
End: 2020-09-24
Payer: COMMERCIAL

## 2020-09-24 VITALS
OXYGEN SATURATION: 98 % | HEART RATE: 83 BPM | TEMPERATURE: 98 F | WEIGHT: 259.5 LBS | HEIGHT: 74 IN | BODY MASS INDEX: 33.3 KG/M2 | DIASTOLIC BLOOD PRESSURE: 82 MMHG | SYSTOLIC BLOOD PRESSURE: 130 MMHG | RESPIRATION RATE: 18 BRPM

## 2020-09-24 DIAGNOSIS — E11.628 DIABETIC FOOT INFECTION: ICD-10-CM

## 2020-09-24 DIAGNOSIS — L08.9 DIABETIC FOOT INFECTION: ICD-10-CM

## 2020-09-24 DIAGNOSIS — I25.810 CORONARY ARTERY DISEASE INVOLVING CORONARY BYPASS GRAFT OF NATIVE HEART WITHOUT ANGINA PECTORIS: ICD-10-CM

## 2020-09-24 DIAGNOSIS — E11.59 HYPERTENSION ASSOCIATED WITH DIABETES: ICD-10-CM

## 2020-09-24 DIAGNOSIS — I15.2 HYPERTENSION ASSOCIATED WITH DIABETES: ICD-10-CM

## 2020-09-24 DIAGNOSIS — Z79.4 DIABETES MELLITUS WITH INSULIN THERAPY: Primary | ICD-10-CM

## 2020-09-24 DIAGNOSIS — E11.9 DIABETES MELLITUS WITH INSULIN THERAPY: Primary | ICD-10-CM

## 2020-09-24 PROCEDURE — 99999 PR PBB SHADOW E&M-EST. PATIENT-LVL IV: CPT | Mod: PBBFAC,,, | Performed by: INTERNAL MEDICINE

## 2020-09-24 PROCEDURE — 99214 OFFICE O/P EST MOD 30 MIN: CPT | Mod: S$GLB,,, | Performed by: INTERNAL MEDICINE

## 2020-09-24 PROCEDURE — 99999 PR PBB SHADOW E&M-EST. PATIENT-LVL IV: ICD-10-PCS | Mod: PBBFAC,,, | Performed by: INTERNAL MEDICINE

## 2020-09-24 PROCEDURE — 99214 PR OFFICE/OUTPT VISIT, EST, LEVL IV, 30-39 MIN: ICD-10-PCS | Mod: S$GLB,,, | Performed by: INTERNAL MEDICINE

## 2020-09-24 NOTE — PROGRESS NOTES
History of present illness:  55-year-old gentleman with diabetes mellitus, hypertension, dyslipidemia, CAD and others in today following up on a couple those issues and also a recent hospitalization for ft infection.  The patient had amputation but had some persistent evidence of infection was admitted for IV antibiotics and is currently on oral antibiotics.  He has follow-up with infectious disease and podiatry.  Diabetic control has been poor.  He does take his insulin as directed.  Currently he feels fine with no chest pain palpitations syncope cough shortness of breath fever chills.    Current medications:  All medications are noted and reviewed in the electronic medical record medication list.    Past medical history, past surgical history, family medical history social history is are all noted and reviewed in the electronic medical record history sections.    Review of systems:  Constitutional:  No fever no chills no generalized body aches.  Respiratory:  No cough shortness of breath.  Cardiovascular:  No chest pain or palpitations no syncope.  Musculoskeletal:  Denies any foot pain    Physical examination:  General:  Pleasant alert appropriately groomed gentleman acute distress.  Vital signs:  All noted and reviewed as normal.  Cardiovascular:  Regular rate rhythm.  No significant murmur.  Carotids full bilaterally bruits.  There is no peripheral extremity edema.  Lungs:  Clear to auscultation.    Data:  Reviewed the recent hospital discharge summary.  Reviewed most recent lab data from last month.  Glycohemoglobin 9.1    Impression:  Insulin controlled diabetes mellitus poor control in part due to recent ongoing foot infection  Hypertension controlled.  CAD clinically stable  Dyslipidemia on high-dose statin therapy    Plan:  Continue current pharmacologic regimen.  Continue follow-up with other subspecialists regarding the foot.  We will have him establish care with our diabetic care team here in the  [place] office.  Return to clinic 6 months.

## 2020-09-25 ENCOUNTER — EXTERNAL HOME HEALTH (OUTPATIENT)
Dept: HOME HEALTH SERVICES | Facility: HOSPITAL | Age: 55
End: 2020-09-25

## 2020-09-28 ENCOUNTER — LAB VISIT (OUTPATIENT)
Dept: LAB | Facility: HOSPITAL | Age: 55
End: 2020-09-28
Attending: INTERNAL MEDICINE
Payer: COMMERCIAL

## 2020-09-28 ENCOUNTER — OFFICE VISIT (OUTPATIENT)
Dept: INFECTIOUS DISEASES | Facility: CLINIC | Age: 55
End: 2020-09-28
Payer: COMMERCIAL

## 2020-09-28 ENCOUNTER — CLINICAL SUPPORT (OUTPATIENT)
Dept: INFECTIOUS DISEASES | Facility: CLINIC | Age: 55
End: 2020-09-28
Payer: COMMERCIAL

## 2020-09-28 VITALS
HEART RATE: 53 BPM | BODY MASS INDEX: 33.54 KG/M2 | SYSTOLIC BLOOD PRESSURE: 118 MMHG | TEMPERATURE: 99 F | WEIGHT: 261.25 LBS | DIASTOLIC BLOOD PRESSURE: 72 MMHG

## 2020-09-28 DIAGNOSIS — E08.621 DIABETIC ULCER OF RIGHT MIDFOOT ASSOCIATED WITH DIABETES MELLITUS DUE TO UNDERLYING CONDITION, LIMITED TO BREAKDOWN OF SKIN: ICD-10-CM

## 2020-09-28 DIAGNOSIS — Z71.85 VACCINE COUNSELING: ICD-10-CM

## 2020-09-28 DIAGNOSIS — E08.621 DIABETIC ULCER OF RIGHT MIDFOOT ASSOCIATED WITH DIABETES MELLITUS DUE TO UNDERLYING CONDITION, LIMITED TO BREAKDOWN OF SKIN: Primary | ICD-10-CM

## 2020-09-28 DIAGNOSIS — L97.411 DIABETIC ULCER OF RIGHT MIDFOOT ASSOCIATED WITH DIABETES MELLITUS DUE TO UNDERLYING CONDITION, LIMITED TO BREAKDOWN OF SKIN: Primary | ICD-10-CM

## 2020-09-28 DIAGNOSIS — M86.9 OSTEOMYELITIS OF GREAT TOE OF LEFT FOOT: ICD-10-CM

## 2020-09-28 DIAGNOSIS — L97.411 DIABETIC ULCER OF RIGHT MIDFOOT ASSOCIATED WITH DIABETES MELLITUS DUE TO UNDERLYING CONDITION, LIMITED TO BREAKDOWN OF SKIN: ICD-10-CM

## 2020-09-28 DIAGNOSIS — S98.112A AMPUTATION OF LEFT GREAT TOE: ICD-10-CM

## 2020-09-28 LAB
CRP SERPL-MCNC: 2.8 MG/L (ref 0–8.2)
ERYTHROCYTE [SEDIMENTATION RATE] IN BLOOD BY WESTERGREN METHOD: 51 MM/HR (ref 0–23)

## 2020-09-28 PROCEDURE — 90686 FLU VACCINE (QUAD) GREATER THAN OR EQUAL TO 3YO PRESERVATIVE FREE IM: ICD-10-PCS | Mod: S$GLB,,, | Performed by: INTERNAL MEDICINE

## 2020-09-28 PROCEDURE — 90670 PCV13 VACCINE IM: CPT | Mod: S$GLB,,, | Performed by: INTERNAL MEDICINE

## 2020-09-28 PROCEDURE — 99214 PR OFFICE/OUTPT VISIT, EST, LEVL IV, 30-39 MIN: ICD-10-PCS | Mod: 25,S$GLB,, | Performed by: INTERNAL MEDICINE

## 2020-09-28 PROCEDURE — 90471 IMMUNIZATION ADMIN: CPT | Mod: S$GLB,,, | Performed by: INTERNAL MEDICINE

## 2020-09-28 PROCEDURE — 36415 COLL VENOUS BLD VENIPUNCTURE: CPT

## 2020-09-28 PROCEDURE — 99214 OFFICE O/P EST MOD 30 MIN: CPT | Mod: 25,S$GLB,, | Performed by: INTERNAL MEDICINE

## 2020-09-28 PROCEDURE — 85652 RBC SED RATE AUTOMATED: CPT

## 2020-09-28 PROCEDURE — 90472 PNEUMOCOCCAL CONJUGATE VACCINE 13-VALENT LESS THAN 5YO & GREATER THAN: ICD-10-PCS | Mod: S$GLB,,, | Performed by: INTERNAL MEDICINE

## 2020-09-28 PROCEDURE — 90670 PNEUMOCOCCAL CONJUGATE VACCINE 13-VALENT LESS THAN 5YO & GREATER THAN: ICD-10-PCS | Mod: S$GLB,,, | Performed by: INTERNAL MEDICINE

## 2020-09-28 PROCEDURE — 86140 C-REACTIVE PROTEIN: CPT

## 2020-09-28 PROCEDURE — 90750 HZV VACC RECOMBINANT IM: CPT | Mod: S$GLB,,, | Performed by: INTERNAL MEDICINE

## 2020-09-28 PROCEDURE — 90750 ZOSTER RECOMBINANT VACCINE: ICD-10-PCS | Mod: S$GLB,,, | Performed by: INTERNAL MEDICINE

## 2020-09-28 PROCEDURE — 90686 IIV4 VACC NO PRSV 0.5 ML IM: CPT | Mod: S$GLB,,, | Performed by: INTERNAL MEDICINE

## 2020-09-28 PROCEDURE — 90471 FLU VACCINE (QUAD) GREATER THAN OR EQUAL TO 3YO PRESERVATIVE FREE IM: ICD-10-PCS | Mod: S$GLB,,, | Performed by: INTERNAL MEDICINE

## 2020-09-28 PROCEDURE — 90472 IMMUNIZATION ADMIN EACH ADD: CPT | Mod: S$GLB,,, | Performed by: INTERNAL MEDICINE

## 2020-09-28 PROCEDURE — 99999 PR PBB SHADOW E&M-EST. PATIENT-LVL III: CPT | Mod: PBBFAC,,, | Performed by: INTERNAL MEDICINE

## 2020-09-28 PROCEDURE — 99999 PR PBB SHADOW E&M-EST. PATIENT-LVL III: ICD-10-PCS | Mod: PBBFAC,,, | Performed by: INTERNAL MEDICINE

## 2020-09-28 NOTE — PROGRESS NOTES
INFECTIOUS DISEASE CLINIC  09/28/2020 10:47 AM    Subjective:      Chief Complaint: follow up foot infection    History of Present Illness:    Patient Billy Rivera is a 55 y.o. male who presents today for hospital follow up of foot infection. Was admitted 9/1 with diabetic foot infection. s/p left 1st ray amputation 9/4 for osteomyelitis diagnosed based on MRI and overlying wounds. Path of clean margins without osteomyelitis. Bone culture of clean margin with finegoldia. Pt received 10 day course of ceftriaxone for GBS SSTI while inpatient and was discharged on po flagyl for continued treatment of the finegoldia.    Pt reports foot healing well. Denies drainage at surgical margin. Denies erythema or warmth. Denies f/c. Wearing offloading boot. Asking about returning to work at [employer]'s office. Says he either has to be no or full duty.       Contains abnormal data C-Reactive Protein   Ref Range & Units 2wk ago  (9/10/20) 3wk ago  (9/6/20) 3wk ago  (9/1/20)   CRP 0.0 - 8.2 mg/L 8.3High   67.5High   108.9High             Contains abnormal data Sedimentation rate   Ref Range & Units 2wk ago  (9/10/20) 3wk ago  (9/6/20) 3wk ago  (9/1/20)   Sed Rate 0 - 23 mm/Hr 84High   91High   47High                   Review of Symptoms:  Constitutional: Denies fevers, chills, or weakness.  ENT: Denies dysphagia, nasal discharge, ear pain or discharge.  Cardiovascular: Denies chest pain, palpitations, orthopnea, or claudication.  Respiratory: Denies shortness of breath, cough, hemoptysis, or wheezing.  GI: Denies nausea/vomitting, hematochezia, melena, abd pain, or changes in appetite.  : Denies dysuria, incontinence, or hematuria.  Musculoskeletal: Denies joint pain or myalgias.  Skin/breast: Denies rashes, lumps, lesions, or discharge.  Neurologic: Denies headache, dizziness, vertigo, or paresthesias.    Past Medical History:   Diagnosis Date    Allergy     CAD (coronary artery disease), native coronary artery 6/25/2013     Cancer     Diabetes mellitus     Diabetes mellitus, type 2     Disorder of kidney and ureter     Heart attack 04/2012    Hx of colon cancer, stage I     Hyperlipidemia     Hypertension     Muscular pain     post-op after colonoscopy    MICHAELA (obstructive sleep apnea)     Retinopathy due to secondary diabetes        Past Surgical History:   Procedure Laterality Date    COLONOSCOPY N/A 2/17/2017    Procedure: COLONOSCOPY;  Surgeon: Simon Gomez MD;  Location: [facility] (4TH FLR);  Service: Endoscopy;  Laterality: N/A;    CORONARY ANGIOPLASTY      INCISION AND DRAINAGE FOOT Right 6/5/2018    Procedure: INCISION AND DRAINAGE, FOOT;  Surgeon: Bridget Santana DPM;  Location: [facility] OR 1ST FLR;  Service: Podiatry;  Laterality: Right;  Request mini C arm in room. request wound vac with medium black sponge    INCISION AND DRAINAGE FOOT Right 6/7/2018    Procedure: INCISION AND DRAINAGE, FOOT;  Surgeon: Emelia Brock DPM;  Location: [facility] OR 2ND FLR;  Service: Podiatry;  Laterality: Right;    INCISION AND DRAINAGE FOOT Left 9/4/2020    Procedure: INCISION AND DRAINAGE, FOOT;  Surgeon: Ainsley Powell DPM;  Location: [facility] OR 2ND FLR;  Service: Podiatry;  Laterality: Left;    INCISION AND DRAINAGE OF ABSCESS Right 6/2/2018    Procedure: INCISION AND DRAINAGE, ABSCESS;  Surgeon: Bridget Santana DPM;  Location: [facility] OR 2ND FLR;  Service: General;  Laterality: Right;    OSTEOTOMY OF METATARSAL BONE  9/4/2020    Procedure: OSTEOTOMY, METATARSAL BONE, 1st METATARSAL RESECTION;  Surgeon: Ainsley Powell DPM;  Location: [facility] OR 2ND FLR;  Service: Podiatry;;    REMOVAL OF FOREIGN BODY FROM FOOT Right 6/7/2018    Procedure: REMOVAL, FOREIGN BODY, FOOT;  Surgeon: Emelia Brock DPM;  Location: [facility] OR 2ND FLR;  Service: Podiatry;  Laterality: Right;    TOE AMPUTATION Left 7/4/2020    Procedure: AMPUTATION, TOE;  Surgeon: Bridget Santana DPM;  Location: [facility] OR 2ND FLR;  Service: Podiatry;  Laterality: Left;     TONSILLECTOMY         Family History   Problem Relation Age of Onset    Heart disease Mother     No Known Problems Father     Heart disease Brother     Anesthesia problems Neg Hx        Social History     Socioeconomic History    Marital status:      Spouse name: Not on file    Number of children: Not on file    Years of education: Not on file    Highest education level: Not on file   Occupational History    Not on file   Social Needs    Financial resource strain: Not on file    Food insecurity     Worry: Not on file     Inability: Not on file    Transportation needs     Medical: Not on file     Non-medical: Not on file   Tobacco Use    Smoking status: Never Smoker    Smokeless tobacco: Never Used   Substance and Sexual Activity    Alcohol use: Yes     Comment: rare x1 beer-     Drug use: No    Sexual activity: Not Currently   Lifestyle    Physical activity     Days per week: Not on file     Minutes per session: Not on file    Stress: Not on file   Relationships    Social connections     Talks on phone: Not on file     Gets together: Not on file     Attends Zoroastrianism service: Not on file     Active member of club or organization: Not on file     Attends meetings of clubs or organizations: Not on file     Relationship status: Not on file   Other Topics Concern    Not on file   Social History Narrative    Not on file       Review of patient's allergies indicates:   Allergen Reactions    Penicillins Other (See Comments)     PCN allergy as a child - was told he went into a coma. Tolerates Cefazolin without adverse reactions    Shellfish containing products      Other reaction(s): Unknown    Vancomycin Itching     Tolerated vancomycin 7/2020    Bactrim  [sulfamethoxazole-trimethoprim] Rash         Objective:   VS (24h):   Vitals:    09/28/20 1023   BP: 118/72   Pulse: (!) 53   Temp: 98.6 °F (37 °C)     General: Afebrile, alert, comfortable, no acute distress.   HEENT: FILIPE. EOMI, no  scleral icterus. N  Pulmonary: Non labored,clear to auscultation A/P/L. No wheezing, crackles, or rhonchi.  Cardiac: normal S1 & S2 w/o rubs/murmurs/gallops.   Abdominal: Non-tender, non-distended.  Extremities: Moves all extremities x 4. No peripheral edema. Wound just wrapped by wound care. Has picture from earlier today. No erythema or drainage  Skin: No jaundice, rashes, or visible lesions.   Neurological:  Alert and oriented x 4.             Labs:  Glucose   Date Value Ref Range Status   09/11/2020 306 (H) 70 - 110 mg/dL Final   09/10/2020 263 (H) 70 - 110 mg/dL Final   09/09/2020 354 (H) 70 - 110 mg/dL Final     Calcium   Date Value Ref Range Status   09/11/2020 8.4 (L) 8.7 - 10.5 mg/dL Final   09/10/2020 8.7 8.7 - 10.5 mg/dL Final   09/09/2020 8.6 (L) 8.7 - 10.5 mg/dL Final     Albumin   Date Value Ref Range Status   09/11/2020 2.7 (L) 3.5 - 5.2 g/dL Final   09/10/2020 2.7 (L) 3.5 - 5.2 g/dL Final   09/09/2020 2.6 (L) 3.5 - 5.2 g/dL Final     Total Protein   Date Value Ref Range Status   09/11/2020 6.8 6.0 - 8.4 g/dL Final   09/10/2020 6.9 6.0 - 8.4 g/dL Final   09/09/2020 6.7 6.0 - 8.4 g/dL Final     Sodium   Date Value Ref Range Status   09/11/2020 132 (L) 136 - 145 mmol/L Final   09/10/2020 134 (L) 136 - 145 mmol/L Final   09/09/2020 135 (L) 136 - 145 mmol/L Final     Potassium   Date Value Ref Range Status   09/11/2020 4.3 3.5 - 5.1 mmol/L Final   09/10/2020 4.4 3.5 - 5.1 mmol/L Final   09/09/2020 4.4 3.5 - 5.1 mmol/L Final     CO2   Date Value Ref Range Status   09/11/2020 26 23 - 29 mmol/L Final   09/10/2020 25 23 - 29 mmol/L Final   09/09/2020 25 23 - 29 mmol/L Final     Chloride   Date Value Ref Range Status   09/11/2020 100 95 - 110 mmol/L Final   09/10/2020 102 95 - 110 mmol/L Final   09/09/2020 101 95 - 110 mmol/L Final     BUN, Bld   Date Value Ref Range Status   09/11/2020 15 6 - 20 mg/dL Final   09/10/2020 15 6 - 20 mg/dL Final   09/09/2020 16 6 - 20 mg/dL Final     Creatinine   Date Value Ref  Range Status   09/11/2020 0.9 0.5 - 1.4 mg/dL Final   09/10/2020 0.9 0.5 - 1.4 mg/dL Final   09/09/2020 1.0 0.5 - 1.4 mg/dL Final     Alkaline Phosphatase   Date Value Ref Range Status   09/11/2020 95 55 - 135 U/L Final   09/10/2020 95 55 - 135 U/L Final   09/09/2020 88 55 - 135 U/L Final     ALT   Date Value Ref Range Status   09/11/2020 18 10 - 44 U/L Final   09/10/2020 20 10 - 44 U/L Final   09/09/2020 20 10 - 44 U/L Final     AST   Date Value Ref Range Status   09/11/2020 15 10 - 40 U/L Final   09/10/2020 14 10 - 40 U/L Final   09/09/2020 15 10 - 40 U/L Final     Total Bilirubin   Date Value Ref Range Status   09/11/2020 0.3 0.1 - 1.0 mg/dL Final     Comment:     For infants and newborns, interpretation of results should be based  on gestational age, weight and in agreement with clinical  observations.  Premature Infant recommended reference ranges:  Up to 24 hours.............<8.0 mg/dL  Up to 48 hours............<12.0 mg/dL  3-5 days..................<15.0 mg/dL  6-29 days.................<15.0 mg/dL     09/10/2020 0.3 0.1 - 1.0 mg/dL Final     Comment:     For infants and newborns, interpretation of results should be based  on gestational age, weight and in agreement with clinical  observations.  Premature Infant recommended reference ranges:  Up to 24 hours.............<8.0 mg/dL  Up to 48 hours............<12.0 mg/dL  3-5 days..................<15.0 mg/dL  6-29 days.................<15.0 mg/dL     09/09/2020 0.3 0.1 - 1.0 mg/dL Final     Comment:     For infants and newborns, interpretation of results should be based  on gestational age, weight and in agreement with clinical  observations.  Premature Infant recommended reference ranges:  Up to 24 hours.............<8.0 mg/dL  Up to 48 hours............<12.0 mg/dL  3-5 days..................<15.0 mg/dL  6-29 days.................<15.0 mg/dL       WBC   Date Value Ref Range Status   09/11/2020 6.78 3.90 - 12.70 K/uL Final   09/10/2020 6.70 3.90 - 12.70 K/uL  Final   09/09/2020 6.45 3.90 - 12.70 K/uL Final     Hemoglobin   Date Value Ref Range Status   09/11/2020 11.9 (L) 14.0 - 18.0 g/dL Final   09/10/2020 12.4 (L) 14.0 - 18.0 g/dL Final   09/09/2020 11.7 (L) 14.0 - 18.0 g/dL Final     POC Hematocrit   Date Value Ref Range Status   06/01/2018 44 36 - 54 %PCV Final     Hematocrit   Date Value Ref Range Status   09/11/2020 36.4 (L) 40.0 - 54.0 % Final   09/10/2020 37.0 (L) 40.0 - 54.0 % Final   09/09/2020 35.6 (L) 40.0 - 54.0 % Final     Mean Corpuscular Volume   Date Value Ref Range Status   09/11/2020 87 82 - 98 fL Final   09/10/2020 85 82 - 98 fL Final   09/09/2020 88 82 - 98 fL Final     Platelets   Date Value Ref Range Status   09/11/2020 300 150 - 350 K/uL Final   09/10/2020 322 150 - 350 K/uL Final   09/09/2020 310 150 - 350 K/uL Final     Lab Results   Component Value Date    CHOL 144 08/12/2020    CHOL 172 08/07/2019    CHOL 126 06/29/2017     Lab Results   Component Value Date    HDL 39 (L) 08/12/2020    HDL 40 08/07/2019    HDL 36 (L) 06/29/2017     Lab Results   Component Value Date    LDLCALC 66.6 08/12/2020    LDLCALC 100.8 08/07/2019    LDLCALC 67.0 06/29/2017     Lab Results   Component Value Date    TRIG 192 (H) 08/12/2020    TRIG 156 (H) 08/07/2019    TRIG 115 06/29/2017     Lab Results   Component Value Date    CHOLHDL 27.1 08/12/2020    CHOLHDL 23.3 08/07/2019    CHOLHDL 28.6 06/29/2017     No results found for: RPR  No results found for: QUANTIFERON    Medications:  Current Outpatient Medications on File Prior to Visit   Medication Sig Dispense Refill    atorvastatin (LIPITOR) 80 MG tablet Take 1 tablet (80 mg total) by mouth once daily. 30 tablet 6    BIOTIN ORAL Take 1 capsule by mouth once daily.      blood sugar diagnostic Strp Strips and lancets    Use before meals and bedtime 150 strip 12    carvediloL (COREG) 12.5 MG tablet Take 1 tablet (12.5 mg total) by mouth 2 (two) times daily. 60 tablet 11    insulin glargine (LANTUS) 100 unit/mL  "injection Inject 55 Units into the skin every evening. 20 mL 11    insulin regular 100 unit/mL Inj injection Inject 20 Units into the skin 3 (three) times daily before meals.  12    lisinopriL (PRINIVIL,ZESTRIL) 40 MG tablet Take 1 tablet (40 mg total) by mouth once daily. 30 tablet 6    metFORMIN (GLUCOPHAGE) 1000 MG tablet Take 1 tablet (1,000 mg total) by mouth 2 (two) times daily with meals. 60 tablet 6    metroNIDAZOLE (FLAGYL) 500 MG tablet Take 1 tablet (500 mg total) by mouth every 8 (eight) hours. 84 tablet 0    pen needle, diabetic 31 gauge x 3/16" Ndle Inject 1 each into the skin 3 (three) times daily before meals. (Patient taking differently: Inject 1 each into the skin 4 (four) times daily. ) 100 each 12    aspirin (ECOTRIN) 81 MG EC tablet Take 1 tablet (81 mg total) by mouth once daily.  0     No current facility-administered medications on file prior to visit.        Antibiotics:   Antibiotics (From admission, onward)    None          HIV: No components found for: HIV 1/2 AG/AB  Hepatitis C IgG: No components found for: HEPATITIS C  Syphilis: No results found for: RPR    Hepatitis A IgG: No components found for: HEPATITIS A IGG  Hepatitis Bc IgG: No components found for: HEPATITIS B CORE IGG  Hepatitis Bs IgG:  Quantiferon: No results found for: QUANTIFERON  VZV IgG: No components found for: VARICELLA IGG    No components found for: SEDIMENTATION RATE  No components found for: C-REACTIVE PROTEIN      Microbiology x 7d:   Microbiology Results (last 7 days)     ** No results found for the last 168 hours. **          Immunization History   Administered Date(s) Administered    Influenza 01/18/2019    Influenza - Intradermal - Quadrivalent - PF 10/24/2013    Influenza - Quadrivalent - PF *Preferred* (6 months and older) 11/08/2010    Pneumococcal Polysaccharide - 23 Valent 06/29/2017    Tdap 09/08/2015     Sedimentation rate.   Ref Range & Units 12:06 2wk ago 3wk ago   Sed Rate 0 - 23 mm/Hr " 51High   84High   91High             VASILIY:   Right Leg: Segmental pressures and PVR waveforms do not suggest any evidence of significant peripheral arterial occlusive disease.       Left Leg: There is a discordance noted between segmental pressures and PVR waveforms. Segmental pressures do not suggest any   evidence of peripheral arterial occlusive disease. However, PVR waveforms suggest minimal peripheral arterial occlusive disease.     -Bilateral Toe PPG's and Pressures were performed.     Rt lower digits: Toe PPG waveforms as described above. TBI of 0.3 with a Great toe pressure of 40 mmHg mmHg is noted.   Lt lower digits: Toe PPG waveforms as described above. TBI of 0.65 with a second toe pressure of 87 mmHg mmHg is noted. (Left   great toe amputation.)         Assessment:     Diabetic foot infection  Osteomyelitis of L 1st digit   Vaccine counseling    Was admitted 9/1 with diabetic foot infection. s/p left 1st ray amputation 9/4 for osteomyelitis diagnosed based on MRI and overlying wounds. Path of clean margins without osteomyelitis. Bone culture of clean margin with finegoldia. Pt received 10 day course of ceftriaxone for GBS SSTI while inpatient and was discharged on po flagyl for continued treatment of the finegoldia.    Plan:     Continue flagyl for finegoldia osteomyelitis. Suspect the GBS has been adequately treated with amputation and then ceftriaxone while inpatient.  Planning for 4-6 weeks of po antibiotics (est end date ~ 10/9)    Trend ESR/CRP    Will see patient in coordination with podiatry in 2 weeks, tentatively planning on stopping antibiotics at that time    Flu, prevnar, shingles vaccines today        Sarah Mancuso MD, MPH  Infectious Disease

## 2020-09-29 ENCOUNTER — OFFICE VISIT (OUTPATIENT)
Dept: PODIATRY | Facility: CLINIC | Age: 55
End: 2020-09-29
Payer: COMMERCIAL

## 2020-09-29 VITALS
HEART RATE: 58 BPM | WEIGHT: 261.25 LBS | SYSTOLIC BLOOD PRESSURE: 134 MMHG | DIASTOLIC BLOOD PRESSURE: 84 MMHG | BODY MASS INDEX: 33.53 KG/M2 | HEIGHT: 74 IN

## 2020-09-29 DIAGNOSIS — Z98.890 POST-OPERATIVE STATE: Primary | ICD-10-CM

## 2020-09-29 PROCEDURE — 99999 PR PBB SHADOW E&M-EST. PATIENT-LVL III: ICD-10-PCS | Mod: PBBFAC,,, | Performed by: PODIATRIST

## 2020-09-29 PROCEDURE — 99024 POSTOP FOLLOW-UP VISIT: CPT | Mod: S$GLB,,, | Performed by: PODIATRIST

## 2020-09-29 PROCEDURE — 99999 PR PBB SHADOW E&M-EST. PATIENT-LVL III: CPT | Mod: PBBFAC,,, | Performed by: PODIATRIST

## 2020-09-29 PROCEDURE — 99024 PR POST-OP FOLLOW-UP VISIT: ICD-10-PCS | Mod: S$GLB,,, | Performed by: PODIATRIST

## 2020-09-29 NOTE — LETTER
September 29, 2020      [id number]  [address]  [facility] [address]  Phone: [phone]       Patient: Billy Rivera   YOB: 1965  Date of Visit: 09/29/2020    To Whom It May Concern:    Sharron Rivera  was at [facility] on 09/29/2020. He may return to work/school on 10/5/2020 with no restrictions. If you have any questions or concerns, or if I can be of further assistance, please do not hesitate to contact me.    Sincerely,            Bridget Santana DPM

## 2020-10-05 LAB
FUNGUS SPEC CULT: NORMAL
FUNGUS SPEC CULT: NORMAL

## 2020-10-05 NOTE — OP NOTE
Operative Note       Surgery Date: 6/5/2018     Surgeon(s) and Role:     * Bridget Santana DPM - Primary     * Tonya Valle MD - Resident - Assisting    Pre-op Diagnosis:  Diabetic ulcer of foot associated with diabetes mellitus due to underlying condition, limited to breakdown of skin [E08.621, L97.501]  Diabetic ulcer of right midfoot associated with diabetes mellitus of other type, unspecified ulcer stage [E13.621, L97.419]    Post-op Diagnosis: Post-Op Diagnosis Codes:     * Diabetic ulcer of foot associated with diabetes mellitus due to underlying condition, limited to breakdown of skin [E08.621, L97.501]     * Diabetic ulcer of right midfoot associated with diabetes mellitus of other type, unspecified ulcer stage [E13.621, L97.419]    Procedure(s) (LRB):  INCISION AND DRAINAGE, FOOT (Right)    Anesthesia: Local MAC    Procedure in Detail/Findings:    The patient was brought to the operating room on a stretcher and placed on the operating table in a supine position. Following the successful induction of MAC anesthesia, a tourniquet was applied to the patients right ankle. Following this, a local anesthetic block consisting of aproximately 18cc of  1:1 mixture of 1% lidocaine plain + 0.5% marcaine plain was injected. Then, the right foot was scrubbed, prepped and draped in the usual aseptic manner.No tourniquet inflated for the duration of the procedure.   Attention was then directed to the right dorsal foot. Bullae formation noted to this area. A # 15 blade used to make incision overlying bullae formation. Copious amounts of purulent drainage noted for right dorsal lateral foot. Tracking noted both medial, lateral and distal towards toes. No bone exposed.   Next attention directed to right plantar foot ulceration. Ronguer used to debride nonviable tissue. C arm used to triangulate foreign body. Foreign body noted at 1st interdigital space. Next incision was made on dorsal foot to assist with  triangulation of foreign body. After multiple attempts, unable to remove foreign body from right foot. All surgical sites copiously irrigated with 6L NS. Wounds packed with 1in. Plain packing soaked in saline.Wrapped with 4x4 gauze, kerlix and ACE.     The patient tolerated the procedure and anesthesia well.He was transferred to the recovery room with vital signs stable, vascular status intact, and capillary refill time < 3 Following a period of post op monitoring, the patient will be returned to inpatient status with the following written and oral post op instructions:     1. Keep dressing dry and intact until reassessment tomorrow   2. Partial heel WB right foot  3. Foreign body embedded deep in soft tissues of right foot, would require much more extensive dissection leading to further ulceration in a diabetic patient.     Tonya Valle DPM PGY-3    Estimated Blood Loss: <5ml  Specimens     Start     Ordered    06/02/18 1227  Specimen to Pathology - Surgery  Once      06/02/18 1228        Implants: * No implants in log *           Disposition: PACU - hemodynamically stable.           Condition: Good    Attestation:  I was present and scrubbed for the entire procedure.              Pt observed to have HR <50 overnight 9/30 and 10/1. Reported chest discomfort earlier on 10/1 but currently denies chest pain or palpitations. Denies prior history of bradycardia. EKG shows sinus bradycardia. Currently HR b/t 30-40s. HR improving since initiating dobutamine gtt (10/2). Arterial line placed 10/2.  - c/w dobutamine 2.5mg gtt, f/u cardiology recs  - if patient become symptomatic and or tachycardic will discontinue dobutamine infusion and alert Cardiology fellow  - will keep Atropine at bedside and Pacer pads on  - f/u TSH  - f/u CXR Pt observed to have HR <50 overnight 9/30 and 10/1. Reported chest discomfort earlier on 10/1 but currently denies chest pain or palpitations. Denies prior history of bradycardia. EKG shows sinus bradycardia. Currently HR b/t 30-40s. HR improving since initiating dobutamine gtt (10/2). Arterial line placed 10/2.  - off dobutamine since 10/5  - will keep Atropine at bedside and Pacer pads on  - f/u CXR

## 2020-10-06 NOTE — PROGRESS NOTES
Subjective:      Patient ID: Billy Rivera is a 55 y.o. male.    Chief Complaint: Wound Check    Pt presents today s/p 1st ray amp. Pt denies any NVFCSOB. Pt states he has kept the post-op dressing intact.     Review of Systems   Constitution: Negative for chills, fever and malaise/fatigue.   HENT: Negative for hearing loss.    Cardiovascular: Negative for claudication.   Respiratory: Negative for shortness of breath.    Skin: Negative for flushing and rash.   Musculoskeletal: Negative for joint pain and myalgias.   Neurological: Negative for loss of balance, numbness, paresthesias and sensory change.   Psychiatric/Behavioral: Negative for altered mental status.           Objective:      Physical Exam  Vitals signs reviewed.   Skin:     General: Skin is warm.      Capillary Refill: Capillary refill takes 2 to 3 seconds.      Comments: Left 1st ray incision intact, small area of dehiscence.   No purulence noted  No erythema noted.    Neurological:      Mental Status: He is alert.               Assessment:       Encounter Diagnosis   Name Primary?    Post-operative state - Left Foot Yes         Plan:       Billy was seen today for wound check.    Diagnoses and all orders for this visit:    Post-operative state - Left Foot      I counseled the patient on his conditions, their implications and medical management.      Incision swabbed with betadine and DSD applied.   Pt advised to elevate and stay off of foot as much as possible.   RTC in 1 week or sooner if any new pedal problems arise, any signs of infection occur, or if condition worsens.     .

## 2020-10-08 ENCOUNTER — EXTERNAL HOME HEALTH (OUTPATIENT)
Dept: HOME HEALTH SERVICES | Facility: HOSPITAL | Age: 55
End: 2020-10-08

## 2020-10-08 ENCOUNTER — TELEPHONE (OUTPATIENT)
Dept: PODIATRY | Facility: CLINIC | Age: 55
End: 2020-10-08

## 2020-10-08 NOTE — TELEPHONE ENCOUNTER
----- Message from Chloé Barajas sent at 10/8/2020  3:06 PM CDT -----  Contact: Juanpablo Alaniz/[facility]  Please call Juanpablo at [phone]    Fax# [phone]    Claim# [id number]    Need an explanation for why the patient can now return to work    Thank you

## 2020-10-09 NOTE — PROGRESS NOTES
Subjective:      Patient ID: Billy Rivera is a 55 y.o. male.    Chief Complaint: Wound Check    Pt presents today s/p 1st ray amp. Pt denies any NVFCSOB. Pt states he has kept the post-op dressing intact.     Review of Systems   Constitution: Negative for chills, fever and malaise/fatigue.   HENT: Negative for hearing loss.    Cardiovascular: Negative for claudication.   Respiratory: Negative for shortness of breath.    Skin: Negative for flushing and rash.   Musculoskeletal: Negative for joint pain and myalgias.   Neurological: Negative for loss of balance, numbness, paresthesias and sensory change.   Psychiatric/Behavioral: Negative for altered mental status.           Objective:      Physical Exam  Vitals signs reviewed.   Skin:     General: Skin is warm.      Capillary Refill: Capillary refill takes 2 to 3 seconds.      Comments: Left 1st ray incision intact, dehiscence healed  Sutures intact.   No purulence noted  No erythema noted.    Neurological:      Mental Status: He is alert.               Assessment:       Encounter Diagnosis   Name Primary?    Post-operative state - Left Foot Yes         Plan:       Billy was seen today for wound check.    Diagnoses and all orders for this visit:    Post-operative state - Left Foot      I counseled the patient on his conditions, their implications and medical management.      Incision swabbed with betadine and all sutures removed without incident.   Protective dressing applied.   Pt released back to work, but advised to monitor daily with 1 week f/u.   Pt advised to elevate and stay off of foot as much as possible.     RTC in 1 week or sooner if any new pedal problems arise, any signs of infection occur, or if condition worsens.     .

## 2020-10-12 ENCOUNTER — HOSPITAL ENCOUNTER (INPATIENT)
Facility: HOSPITAL | Age: 55
LOS: 3 days | Discharge: HOME HEALTH | DRG: 617 | End: 2020-10-15
Attending: EMERGENCY MEDICINE | Admitting: INTERNAL MEDICINE
Payer: COMMERCIAL

## 2020-10-12 DIAGNOSIS — Z79.4 TYPE 2 DIABETES MELLITUS WITH HYPERGLYCEMIA, WITH LONG-TERM CURRENT USE OF INSULIN: ICD-10-CM

## 2020-10-12 DIAGNOSIS — E83.42 HYPOMAGNESEMIA: ICD-10-CM

## 2020-10-12 DIAGNOSIS — M86.072 ACUTE HEMATOGENOUS OSTEOMYELITIS OF LEFT FOOT: ICD-10-CM

## 2020-10-12 DIAGNOSIS — E11.65 TYPE 2 DIABETES MELLITUS WITH HYPERGLYCEMIA, WITH LONG-TERM CURRENT USE OF INSULIN: ICD-10-CM

## 2020-10-12 DIAGNOSIS — E87.1 HYPONATREMIA: ICD-10-CM

## 2020-10-12 DIAGNOSIS — L97.529 TYPE 2 DIABETES MELLITUS WITH LEFT DIABETIC FOOT ULCER: Primary | ICD-10-CM

## 2020-10-12 DIAGNOSIS — L03.116 LEFT LEG CELLULITIS: ICD-10-CM

## 2020-10-12 DIAGNOSIS — E11.621 TYPE 2 DIABETES MELLITUS WITH LEFT DIABETIC FOOT ULCER: Primary | ICD-10-CM

## 2020-10-12 DIAGNOSIS — L53.9 REDNESS: ICD-10-CM

## 2020-10-12 LAB
ALBUMIN SERPL BCP-MCNC: 3.5 G/DL (ref 3.5–5.2)
ALP SERPL-CCNC: 94 U/L (ref 55–135)
ALT SERPL W/O P-5'-P-CCNC: 16 U/L (ref 10–44)
ANION GAP SERPL CALC-SCNC: 11 MMOL/L (ref 8–16)
AST SERPL-CCNC: 15 U/L (ref 10–40)
BASOPHILS # BLD AUTO: 0.04 K/UL (ref 0–0.2)
BASOPHILS NFR BLD: 0.4 % (ref 0–1.9)
BILIRUB SERPL-MCNC: 0.7 MG/DL (ref 0.1–1)
BUN SERPL-MCNC: 27 MG/DL (ref 6–20)
CALCIUM SERPL-MCNC: 9.1 MG/DL (ref 8.7–10.5)
CHLORIDE SERPL-SCNC: 97 MMOL/L (ref 95–110)
CO2 SERPL-SCNC: 22 MMOL/L (ref 23–29)
CREAT SERPL-MCNC: 1.2 MG/DL (ref 0.5–1.4)
CRP SERPL-MCNC: 21.7 MG/L (ref 0–8.2)
CTP QC/QA: YES
DIFFERENTIAL METHOD: ABNORMAL
EOSINOPHIL # BLD AUTO: 0.1 K/UL (ref 0–0.5)
EOSINOPHIL NFR BLD: 1.3 % (ref 0–8)
ERYTHROCYTE [DISTWIDTH] IN BLOOD BY AUTOMATED COUNT: 14 % (ref 11.5–14.5)
ERYTHROCYTE [SEDIMENTATION RATE] IN BLOOD BY WESTERGREN METHOD: 55 MM/HR (ref 0–23)
EST. GFR  (AFRICAN AMERICAN): >60 ML/MIN/1.73 M^2
EST. GFR  (NON AFRICAN AMERICAN): >60 ML/MIN/1.73 M^2
GLUCOSE SERPL-MCNC: 328 MG/DL (ref 70–110)
HCT VFR BLD AUTO: 40.6 % (ref 40–54)
HGB BLD-MCNC: 13.8 G/DL (ref 14–18)
IMM GRANULOCYTES # BLD AUTO: 0.04 K/UL (ref 0–0.04)
IMM GRANULOCYTES NFR BLD AUTO: 0.4 % (ref 0–0.5)
LACTATE SERPL-SCNC: 1.1 MMOL/L (ref 0.5–2.2)
LYMPHOCYTES # BLD AUTO: 1.7 K/UL (ref 1–4.8)
LYMPHOCYTES NFR BLD: 17.2 % (ref 18–48)
MAGNESIUM SERPL-MCNC: 1.4 MG/DL (ref 1.6–2.6)
MCH RBC QN AUTO: 29.7 PG (ref 27–31)
MCHC RBC AUTO-ENTMCNC: 34 G/DL (ref 32–36)
MCV RBC AUTO: 87 FL (ref 82–98)
MONOCYTES # BLD AUTO: 1.2 K/UL (ref 0.3–1)
MONOCYTES NFR BLD: 12.3 % (ref 4–15)
NEUTROPHILS # BLD AUTO: 6.9 K/UL (ref 1.8–7.7)
NEUTROPHILS NFR BLD: 68.4 % (ref 38–73)
NRBC BLD-RTO: 0 /100 WBC
PLATELET # BLD AUTO: 261 K/UL (ref 150–350)
PMV BLD AUTO: 9.7 FL (ref 9.2–12.9)
POTASSIUM SERPL-SCNC: 4 MMOL/L (ref 3.5–5.1)
PROT SERPL-MCNC: 7.4 G/DL (ref 6–8.4)
RBC # BLD AUTO: 4.65 M/UL (ref 4.6–6.2)
SARS-COV-2 RDRP RESP QL NAA+PROBE: NEGATIVE
SODIUM SERPL-SCNC: 130 MMOL/L (ref 136–145)
WBC # BLD AUTO: 10.09 K/UL (ref 3.9–12.7)

## 2020-10-12 PROCEDURE — 12000002 HC ACUTE/MED SURGE SEMI-PRIVATE ROOM

## 2020-10-12 PROCEDURE — 83605 ASSAY OF LACTIC ACID: CPT

## 2020-10-12 PROCEDURE — U0002 COVID-19 LAB TEST NON-CDC: HCPCS | Performed by: PHYSICIAN ASSISTANT

## 2020-10-12 PROCEDURE — 96374 THER/PROPH/DIAG INJ IV PUSH: CPT

## 2020-10-12 PROCEDURE — 85025 COMPLETE CBC W/AUTO DIFF WBC: CPT

## 2020-10-12 PROCEDURE — 86140 C-REACTIVE PROTEIN: CPT

## 2020-10-12 PROCEDURE — 83735 ASSAY OF MAGNESIUM: CPT

## 2020-10-12 PROCEDURE — 99285 EMERGENCY DEPT VISIT HI MDM: CPT | Mod: 25

## 2020-10-12 PROCEDURE — 85652 RBC SED RATE AUTOMATED: CPT

## 2020-10-12 PROCEDURE — 80053 COMPREHEN METABOLIC PANEL: CPT

## 2020-10-12 PROCEDURE — 99285 EMERGENCY DEPT VISIT HI MDM: CPT | Mod: ,,, | Performed by: PHYSICIAN ASSISTANT

## 2020-10-12 PROCEDURE — 99285 PR EMERGENCY DEPT VISIT,LEVEL V: ICD-10-PCS | Mod: ,,, | Performed by: PHYSICIAN ASSISTANT

## 2020-10-12 RX ORDER — CEFTRIAXONE 1 G/1
1 INJECTION, POWDER, FOR SOLUTION INTRAMUSCULAR; INTRAVENOUS
Status: COMPLETED | OUTPATIENT
Start: 2020-10-12 | End: 2020-10-13

## 2020-10-13 ENCOUNTER — ANESTHESIA EVENT (OUTPATIENT)
Dept: SURGERY | Facility: HOSPITAL | Age: 55
DRG: 617 | End: 2020-10-13
Payer: COMMERCIAL

## 2020-10-13 PROBLEM — M86.9 OSTEOMYELITIS OF GREAT TOE OF LEFT FOOT: Status: RESOLVED | Noted: 2020-07-03 | Resolved: 2020-10-13

## 2020-10-13 PROBLEM — M86.172 ACUTE OSTEOMYELITIS OF LEFT FOOT: Status: RESOLVED | Noted: 2020-09-02 | Resolved: 2020-10-13

## 2020-10-13 PROBLEM — M86.9 OSTEOMYELITIS: Status: RESOLVED | Noted: 2020-09-01 | Resolved: 2020-10-13

## 2020-10-13 PROBLEM — U07.1 COVID-19 VIRUS DETECTED: Status: RESOLVED | Noted: 2020-09-01 | Resolved: 2020-10-13

## 2020-10-13 LAB
ALBUMIN SERPL BCP-MCNC: 3 G/DL (ref 3.5–5.2)
ALP SERPL-CCNC: 83 U/L (ref 55–135)
ALT SERPL W/O P-5'-P-CCNC: 15 U/L (ref 10–44)
ANION GAP SERPL CALC-SCNC: 9 MMOL/L (ref 8–16)
APTT BLDCRRT: 30.1 SEC (ref 21–32)
AST SERPL-CCNC: 15 U/L (ref 10–40)
BASOPHILS # BLD AUTO: 0.04 K/UL (ref 0–0.2)
BASOPHILS NFR BLD: 0.6 % (ref 0–1.9)
BILIRUB SERPL-MCNC: 0.4 MG/DL (ref 0.1–1)
BUN SERPL-MCNC: 24 MG/DL (ref 6–20)
CALCIUM SERPL-MCNC: 8.4 MG/DL (ref 8.7–10.5)
CHLORIDE SERPL-SCNC: 97 MMOL/L (ref 95–110)
CO2 SERPL-SCNC: 23 MMOL/L (ref 23–29)
CREAT SERPL-MCNC: 1 MG/DL (ref 0.5–1.4)
DIFFERENTIAL METHOD: ABNORMAL
EOSINOPHIL # BLD AUTO: 0.1 K/UL (ref 0–0.5)
EOSINOPHIL NFR BLD: 2.1 % (ref 0–8)
ERYTHROCYTE [DISTWIDTH] IN BLOOD BY AUTOMATED COUNT: 14.1 % (ref 11.5–14.5)
EST. GFR  (AFRICAN AMERICAN): >60 ML/MIN/1.73 M^2
EST. GFR  (NON AFRICAN AMERICAN): >60 ML/MIN/1.73 M^2
ESTIMATED AVG GLUCOSE: 263 MG/DL (ref 68–131)
GLUCOSE SERPL-MCNC: 436 MG/DL (ref 70–110)
HBA1C MFR BLD HPLC: 10.8 % (ref 4–5.6)
HCT VFR BLD AUTO: 37.2 % (ref 40–54)
HGB BLD-MCNC: 12.3 G/DL (ref 14–18)
IMM GRANULOCYTES # BLD AUTO: 0.02 K/UL (ref 0–0.04)
IMM GRANULOCYTES NFR BLD AUTO: 0.3 % (ref 0–0.5)
INR PPP: 1 (ref 0.8–1.2)
LYMPHOCYTES # BLD AUTO: 1.6 K/UL (ref 1–4.8)
LYMPHOCYTES NFR BLD: 23.5 % (ref 18–48)
MAGNESIUM SERPL-MCNC: 1.3 MG/DL (ref 1.6–2.6)
MCH RBC QN AUTO: 29.1 PG (ref 27–31)
MCHC RBC AUTO-ENTMCNC: 33.1 G/DL (ref 32–36)
MCV RBC AUTO: 88 FL (ref 82–98)
MONOCYTES # BLD AUTO: 0.9 K/UL (ref 0.3–1)
MONOCYTES NFR BLD: 13.7 % (ref 4–15)
NEUTROPHILS # BLD AUTO: 4 K/UL (ref 1.8–7.7)
NEUTROPHILS NFR BLD: 59.8 % (ref 38–73)
NRBC BLD-RTO: 0 /100 WBC
PHOSPHATE SERPL-MCNC: 3.6 MG/DL (ref 2.7–4.5)
PLATELET # BLD AUTO: 214 K/UL (ref 150–350)
PMV BLD AUTO: 10.8 FL (ref 9.2–12.9)
POCT GLUCOSE: 268 MG/DL (ref 70–110)
POCT GLUCOSE: 280 MG/DL (ref 70–110)
POCT GLUCOSE: 398 MG/DL (ref 70–110)
POTASSIUM SERPL-SCNC: 3.9 MMOL/L (ref 3.5–5.1)
PROT SERPL-MCNC: 6.6 G/DL (ref 6–8.4)
PROTHROMBIN TIME: 11.5 SEC (ref 9–12.5)
RBC # BLD AUTO: 4.22 M/UL (ref 4.6–6.2)
SODIUM SERPL-SCNC: 129 MMOL/L (ref 136–145)
WBC # BLD AUTO: 6.65 K/UL (ref 3.9–12.7)

## 2020-10-13 PROCEDURE — 84100 ASSAY OF PHOSPHORUS: CPT

## 2020-10-13 PROCEDURE — 83735 ASSAY OF MAGNESIUM: CPT

## 2020-10-13 PROCEDURE — 85025 COMPLETE CBC W/AUTO DIFF WBC: CPT

## 2020-10-13 PROCEDURE — 63600175 PHARM REV CODE 636 W HCPCS: Performed by: INTERNAL MEDICINE

## 2020-10-13 PROCEDURE — 80053 COMPREHEN METABOLIC PANEL: CPT

## 2020-10-13 PROCEDURE — 85610 PROTHROMBIN TIME: CPT

## 2020-10-13 PROCEDURE — 63600175 PHARM REV CODE 636 W HCPCS: Performed by: STUDENT IN AN ORGANIZED HEALTH CARE EDUCATION/TRAINING PROGRAM

## 2020-10-13 PROCEDURE — 99223 PR INITIAL HOSPITAL CARE,LEVL III: ICD-10-PCS | Mod: ,,, | Performed by: HOSPITALIST

## 2020-10-13 PROCEDURE — 85730 THROMBOPLASTIN TIME PARTIAL: CPT

## 2020-10-13 PROCEDURE — 25000003 PHARM REV CODE 250: Performed by: STUDENT IN AN ORGANIZED HEALTH CARE EDUCATION/TRAINING PROGRAM

## 2020-10-13 PROCEDURE — 99024 PR POST-OP FOLLOW-UP VISIT: ICD-10-PCS | Mod: ,,, | Performed by: PODIATRIST

## 2020-10-13 PROCEDURE — 97802 MEDICAL NUTRITION INDIV IN: CPT

## 2020-10-13 PROCEDURE — 11000001 HC ACUTE MED/SURG PRIVATE ROOM

## 2020-10-13 PROCEDURE — C9399 UNCLASSIFIED DRUGS OR BIOLOG: HCPCS | Performed by: STUDENT IN AN ORGANIZED HEALTH CARE EDUCATION/TRAINING PROGRAM

## 2020-10-13 PROCEDURE — 36415 COLL VENOUS BLD VENIPUNCTURE: CPT

## 2020-10-13 PROCEDURE — 25000003 PHARM REV CODE 250: Performed by: INTERNAL MEDICINE

## 2020-10-13 PROCEDURE — 99223 1ST HOSP IP/OBS HIGH 75: CPT | Mod: ,,, | Performed by: HOSPITALIST

## 2020-10-13 PROCEDURE — 99024 POSTOP FOLLOW-UP VISIT: CPT | Mod: ,,, | Performed by: PODIATRIST

## 2020-10-13 PROCEDURE — 83036 HEMOGLOBIN GLYCOSYLATED A1C: CPT

## 2020-10-13 PROCEDURE — 63600175 PHARM REV CODE 636 W HCPCS: Performed by: PHYSICIAN ASSISTANT

## 2020-10-13 RX ORDER — ATORVASTATIN CALCIUM 20 MG/1
80 TABLET, FILM COATED ORAL DAILY
Status: DISCONTINUED | OUTPATIENT
Start: 2020-10-13 | End: 2020-10-15 | Stop reason: HOSPADM

## 2020-10-13 RX ORDER — ACETAMINOPHEN 325 MG/1
650 TABLET ORAL EVERY 4 HOURS PRN
Status: DISCONTINUED | OUTPATIENT
Start: 2020-10-13 | End: 2020-10-15 | Stop reason: HOSPADM

## 2020-10-13 RX ORDER — ENOXAPARIN SODIUM 100 MG/ML
40 INJECTION SUBCUTANEOUS EVERY 24 HOURS
Status: DISCONTINUED | OUTPATIENT
Start: 2020-10-13 | End: 2020-10-15 | Stop reason: HOSPADM

## 2020-10-13 RX ORDER — MAGNESIUM SULFATE HEPTAHYDRATE 40 MG/ML
2 INJECTION, SOLUTION INTRAVENOUS ONCE
Status: DISCONTINUED | OUTPATIENT
Start: 2020-10-13 | End: 2020-10-13

## 2020-10-13 RX ORDER — LISINOPRIL 20 MG/1
40 TABLET ORAL DAILY
Status: DISCONTINUED | OUTPATIENT
Start: 2020-10-13 | End: 2020-10-13

## 2020-10-13 RX ORDER — MAGNESIUM SULFATE HEPTAHYDRATE 40 MG/ML
2 INJECTION, SOLUTION INTRAVENOUS
Status: COMPLETED | OUTPATIENT
Start: 2020-10-13 | End: 2020-10-13

## 2020-10-13 RX ORDER — TALC
6 POWDER (GRAM) TOPICAL NIGHTLY PRN
Status: DISCONTINUED | OUTPATIENT
Start: 2020-10-13 | End: 2020-10-15 | Stop reason: HOSPADM

## 2020-10-13 RX ORDER — INSULIN ASPART 100 [IU]/ML
1-10 INJECTION, SOLUTION INTRAVENOUS; SUBCUTANEOUS
Status: DISCONTINUED | OUTPATIENT
Start: 2020-10-13 | End: 2020-10-15 | Stop reason: HOSPADM

## 2020-10-13 RX ORDER — SODIUM CHLORIDE 0.9 % (FLUSH) 0.9 %
10 SYRINGE (ML) INJECTION
Status: DISCONTINUED | OUTPATIENT
Start: 2020-10-13 | End: 2020-10-15 | Stop reason: HOSPADM

## 2020-10-13 RX ORDER — IBUPROFEN 200 MG
16 TABLET ORAL
Status: DISCONTINUED | OUTPATIENT
Start: 2020-10-13 | End: 2020-10-15 | Stop reason: HOSPADM

## 2020-10-13 RX ORDER — GLUCAGON 1 MG
1 KIT INJECTION
Status: DISCONTINUED | OUTPATIENT
Start: 2020-10-13 | End: 2020-10-15 | Stop reason: HOSPADM

## 2020-10-13 RX ORDER — ONDANSETRON 8 MG/1
8 TABLET, ORALLY DISINTEGRATING ORAL EVERY 8 HOURS PRN
Status: DISCONTINUED | OUTPATIENT
Start: 2020-10-13 | End: 2020-10-15 | Stop reason: HOSPADM

## 2020-10-13 RX ORDER — ASPIRIN 81 MG/1
81 TABLET ORAL DAILY
Status: DISCONTINUED | OUTPATIENT
Start: 2020-10-13 | End: 2020-10-15 | Stop reason: HOSPADM

## 2020-10-13 RX ORDER — POLYETHYLENE GLYCOL 3350 17 G/17G
17 POWDER, FOR SOLUTION ORAL 2 TIMES DAILY PRN
Status: DISCONTINUED | OUTPATIENT
Start: 2020-10-13 | End: 2020-10-15 | Stop reason: HOSPADM

## 2020-10-13 RX ORDER — CARVEDILOL 12.5 MG/1
12.5 TABLET ORAL 2 TIMES DAILY
Status: DISCONTINUED | OUTPATIENT
Start: 2020-10-13 | End: 2020-10-15 | Stop reason: HOSPADM

## 2020-10-13 RX ORDER — CIPROFLOXACIN 750 MG/1
750 TABLET, FILM COATED ORAL EVERY 12 HOURS
Status: DISCONTINUED | OUTPATIENT
Start: 2020-10-13 | End: 2020-10-15 | Stop reason: HOSPADM

## 2020-10-13 RX ORDER — IBUPROFEN 200 MG
24 TABLET ORAL
Status: DISCONTINUED | OUTPATIENT
Start: 2020-10-13 | End: 2020-10-15 | Stop reason: HOSPADM

## 2020-10-13 RX ADMIN — MAGNESIUM SULFATE 2 G: 2 INJECTION INTRAVENOUS at 10:10

## 2020-10-13 RX ADMIN — CIPROFLOXACIN 750 MG: 750 TABLET, FILM COATED ORAL at 08:10

## 2020-10-13 RX ADMIN — CARVEDILOL 12.5 MG: 12.5 TABLET, FILM COATED ORAL at 08:10

## 2020-10-13 RX ADMIN — ATORVASTATIN CALCIUM 80 MG: 20 TABLET, FILM COATED ORAL at 08:10

## 2020-10-13 RX ADMIN — ASPIRIN 81 MG: 81 TABLET, COATED ORAL at 08:10

## 2020-10-13 RX ADMIN — VANCOMYCIN HYDROCHLORIDE 2500 MG: 1 INJECTION, POWDER, LYOPHILIZED, FOR SOLUTION INTRAVENOUS at 03:10

## 2020-10-13 RX ADMIN — MAGNESIUM SULFATE 2 G: 2 INJECTION INTRAVENOUS at 08:10

## 2020-10-13 RX ADMIN — INSULIN ASPART 6 UNITS: 100 INJECTION, SOLUTION INTRAVENOUS; SUBCUTANEOUS at 12:10

## 2020-10-13 RX ADMIN — INSULIN ASPART 4 UNITS: 100 INJECTION, SOLUTION INTRAVENOUS; SUBCUTANEOUS at 10:10

## 2020-10-13 RX ADMIN — CIPROFLOXACIN 750 MG: 750 TABLET, FILM COATED ORAL at 09:10

## 2020-10-13 RX ADMIN — INSULIN DETEMIR 22 UNITS: 100 INJECTION, SOLUTION SUBCUTANEOUS at 03:10

## 2020-10-13 RX ADMIN — VANCOMYCIN HYDROCHLORIDE 1750 MG: 750 INJECTION, POWDER, LYOPHILIZED, FOR SOLUTION INTRAVENOUS at 04:10

## 2020-10-13 RX ADMIN — INSULIN ASPART 6 UNITS: 100 INJECTION, SOLUTION INTRAVENOUS; SUBCUTANEOUS at 04:10

## 2020-10-13 RX ADMIN — CEFTRIAXONE SODIUM 1 G: 1 INJECTION, POWDER, FOR SOLUTION INTRAMUSCULAR; INTRAVENOUS at 12:10

## 2020-10-13 RX ADMIN — ENOXAPARIN SODIUM 40 MG: 40 INJECTION SUBCUTANEOUS at 04:10

## 2020-10-13 RX ADMIN — CARVEDILOL 12.5 MG: 12.5 TABLET, FILM COATED ORAL at 09:10

## 2020-10-13 NOTE — ED TRIAGE NOTES
Patient presents to the ED with a diabetic wound to his left second toe. Patient had an amputation of his right big toe in august. Patient states he noticed the wound yesterday. Pedal pulses strong +2

## 2020-10-13 NOTE — SUBJECTIVE & OBJECTIVE
Past Medical History:   Diagnosis Date    Allergy     CAD (coronary artery disease), native coronary artery 6/25/2013    Cancer     Diabetes mellitus     Diabetes mellitus, type 2     Disorder of kidney and ureter     Heart attack 04/2012    Hx of colon cancer, stage I     Hyperlipidemia     Hypertension     Muscular pain     post-op after colonoscopy    MICHAELA (obstructive sleep apnea)     Retinopathy due to secondary diabetes        Past Surgical History:   Procedure Laterality Date    COLONOSCOPY N/A 2/17/2017    Procedure: COLONOSCOPY;  Surgeon: Simon Gomez MD;  Location: [facility] (4TH FLR);  Service: Endoscopy;  Laterality: N/A;    CORONARY ANGIOPLASTY      INCISION AND DRAINAGE FOOT Right 6/5/2018    Procedure: INCISION AND DRAINAGE, FOOT;  Surgeon: Bridget Santana DPM;  Location: [facility] OR 1ST FLR;  Service: Podiatry;  Laterality: Right;  Request mini C arm in room. request wound vac with medium black sponge    INCISION AND DRAINAGE FOOT Right 6/7/2018    Procedure: INCISION AND DRAINAGE, FOOT;  Surgeon: Emelia Brock DPM;  Location: [facility] OR 2ND FLR;  Service: Podiatry;  Laterality: Right;    INCISION AND DRAINAGE FOOT Left 9/4/2020    Procedure: INCISION AND DRAINAGE, FOOT;  Surgeon: Ainsley Powell DPM;  Location: [facility] OR 2ND FLR;  Service: Podiatry;  Laterality: Left;    INCISION AND DRAINAGE OF ABSCESS Right 6/2/2018    Procedure: INCISION AND DRAINAGE, ABSCESS;  Surgeon: Bridget Santana DPM;  Location: [facility] OR 2ND FLR;  Service: General;  Laterality: Right;    OSTEOTOMY OF METATARSAL BONE  9/4/2020    Procedure: OSTEOTOMY, METATARSAL BONE, 1st METATARSAL RESECTION;  Surgeon: Ainsley Powell DPM;  Location: [facility] OR 2ND FLR;  Service: Podiatry;;    REMOVAL OF FOREIGN BODY FROM FOOT Right 6/7/2018    Procedure: REMOVAL, FOREIGN BODY, FOOT;  Surgeon: Emelia Brock DPM;  [facility];  Service: Podiatry;  Laterality: Right;    TOE AMPUTATION Left 7/4/2020    Procedure:  "AMPUTATION, TOE;  Surgeon: Bridget Santana DPM;  Location: [facility] OR [address] [place] [address];  Service: Podiatry;  Laterality: Left;    TONSILLECTOMY         Review of patient's allergies indicates:   Allergen Reactions    Penicillins Other (See Comments)     PCN allergy as a child - was told he went into a coma. Tolerates Cefazolin without adverse reactions    Shellfish containing products      Other reaction(s): Unknown    Vancomycin Itching     Tolerated vancomycin 7/2020    Bactrim  [sulfamethoxazole-trimethoprim] Rash       No current facility-administered medications on file prior to encounter.      Current Outpatient Medications on File Prior to Encounter   Medication Sig    aspirin (ECOTRIN) 81 MG EC tablet Take 1 tablet (81 mg total) by mouth once daily.    atorvastatin (LIPITOR) 80 MG tablet Take 1 tablet (80 mg total) by mouth once daily.    BIOTIN ORAL Take 1 capsule by mouth once daily.    blood sugar diagnostic Strp Strips and lancets    Use before meals and bedtime    carvediloL (COREG) 12.5 MG tablet Take 1 tablet (12.5 mg total) by mouth 2 (two) times daily.    insulin glargine (LANTUS) 100 unit/mL injection Inject 55 Units into the skin every evening.    insulin regular 100 unit/mL Inj injection Inject 20 Units into the skin 3 (three) times daily before meals.    lisinopriL (PRINIVIL,ZESTRIL) 40 MG tablet Take 1 tablet (40 mg total) by mouth once daily.    metFORMIN (GLUCOPHAGE) 1000 MG tablet TAKE 1 TABLET BY MOUTH TWICE DAILY WITH MEALS    pen needle, diabetic 31 gauge x 3/16" Ndle Inject 1 each into the skin 3 (three) times daily before meals. (Patient taking differently: Inject 1 each into the skin 4 (four) times daily. )     Family History     Problem Relation (Age of Onset)    Heart disease Mother, Brother    No Known Problems Father        Tobacco Use    Smoking status: Never Smoker    Smokeless tobacco: Never Used   Substance and Sexual Activity    Alcohol use: Yes     Comment: rare x1 " beer-     Drug use: No    Sexual activity: Not Currently     Review of Systems   Constitutional: Negative for chills, fatigue and fever.   HENT: Negative for congestion, rhinorrhea and sore throat.    Eyes: Negative.    Respiratory: Negative for cough, shortness of breath and wheezing.    Cardiovascular: Negative for chest pain and leg swelling.   Gastrointestinal: Negative for abdominal pain, constipation, diarrhea, nausea and vomiting.   Genitourinary: Negative for dysuria, frequency and urgency.   Musculoskeletal: Negative for arthralgias and myalgias.   Skin: Positive for color change and wound. Negative for rash.        L 2nd and 3rd toe ulceration with associated erythema to mid-calf and soft-tissue edema   Neurological: Negative for weakness and headaches.   Psychiatric/Behavioral: Negative for agitation and confusion.     Objective:     Vital Signs (Most Recent):  Temp: 97.9 °F (36.6 °C) (10/12/20 2111)  Pulse: 78 (10/12/20 2111)  Resp: 19 (10/12/20 2111)  BP: 127/80 (10/12/20 2111)  SpO2: 98 % (10/12/20 2111) Vital Signs (24h Range):  Temp:  [97.9 °F (36.6 °C)] 97.9 °F (36.6 °C)  Pulse:  [78] 78  Resp:  [19] 19  SpO2:  [98 %] 98 %  BP: (127)/(80) 127/80     Weight: 117.9 kg (260 lb)  Body mass index is 33.38 kg/m².    Physical Exam  Constitutional:       General: He is not in acute distress.     Appearance: Normal appearance.   HENT:      Head: Normocephalic and atraumatic.      Nose: No congestion.      Mouth/Throat:      Mouth: Mucous membranes are moist.   Eyes:      Extraocular Movements: Extraocular movements intact.      Pupils: Pupils are equal, round, and reactive to light.   Neck:      Musculoskeletal: Normal range of motion and neck supple. No neck rigidity.   Cardiovascular:      Rate and Rhythm: Normal rate and regular rhythm.      Pulses: Normal pulses.      Heart sounds: Normal heart sounds. No murmur.   Pulmonary:      Effort: No respiratory distress.      Breath sounds: Normal breath  sounds. No wheezing.   Abdominal:      General: Abdomen is flat. Bowel sounds are normal. There is no distension.      Palpations: Abdomen is soft.      Tenderness: There is no abdominal tenderness.   Musculoskeletal:         General: Swelling (2+ pitting edema of LLE to mid-calf) and deformity (L great toe amputation) present. No tenderness.   Skin:     General: Skin is warm and dry.      Findings: Erythema and lesion (L 2nd toe and 3rd toe ulceration at tips) present.   Neurological:      General: No focal deficit present.      Mental Status: He is alert and oriented to person, place, and time.           CRANIAL NERVES     CN III, IV, VI   Pupils are equal, round, and reactive to light.       Significant Labs:   CBC:   Recent Labs   Lab 10/12/20  2137   WBC 10.09   HGB 13.8*   HCT 40.6        CMP:   Recent Labs   Lab 10/12/20  2137   *   K 4.0   CL 97   CO2 22*   *   BUN 27*   CREATININE 1.2   CALCIUM 9.1   PROT 7.4   ALBUMIN 3.5   BILITOT 0.7   ALKPHOS 94   AST 15   ALT 16   ANIONGAP 11   EGFRNONAA >60.0     Lactic Acid:   Recent Labs   Lab 10/12/20  2137   LACTATE 1.1     POCT Glucose: No results for input(s): POCTGLUCOSE in the last 48 hours.  All pertinent labs within the past 24 hours have been reviewed.    Significant Imaging: I have reviewed all pertinent imaging results/findings within the past 24 hours.  X-Ray L foot: soft tissue ulceration with no signs of osteomyelitis

## 2020-10-13 NOTE — FIRST PROVIDER EVALUATION
" Emergency Department TeleTriage Encounter Note      CHIEF COMPLAINT    Chief Complaint   Patient presents with    Toe Pain     pt had left great toe amputated in Aug. Now reporting problems w/ second toe on left foot. Reports the toe nail feel off and it's red. States "it looks wet"       VITAL SIGNS   Initial Vitals [10/12/20 2111]   BP Pulse Resp Temp SpO2   127/80 78 19 97.9 °F (36.6 °C) 98 %      MAP       --            ALLERGIES    Review of patient's allergies indicates:   Allergen Reactions    Penicillins Other (See Comments)     PCN allergy as a child - was told he went into a coma. Tolerates Cefazolin without adverse reactions    Shellfish containing products      Other reaction(s): Unknown    Vancomycin Itching     Tolerated vancomycin 7/2020    Bactrim  [sulfamethoxazole-trimethoprim] Rash       PROVIDER TRIAGE NOTE  Patient is a 55 y.o. male presenting for pain, redness, and wetness to left toe. States recently had the adjacent toe amputated due to infection, concerned he is now developing infection in other toe. Denies purulence but describes wetness. No fever.       ORDERS  Labs Reviewed   CBC W/ AUTO DIFFERENTIAL   COMPREHENSIVE METABOLIC PANEL   C-REACTIVE PROTEIN   SEDIMENTATION RATE   LACTIC ACID, PLASMA   MAGNESIUM       ED Orders (720h ago, onward)    Start Ordered     Status Ordering Provider    10/12/20 2121 10/12/20 2120  Magnesium  STAT  Collect    Ordered CASEY LOREDO    10/12/20 2120 10/12/20 2120  CBC auto differential  STAT  Collect    Ordered CASEY LOREDO    10/12/20 2120 10/12/20 2120  Comprehensive metabolic panel  STAT  Collect    Ordered CASEY LOREDO    10/12/20 2120 10/12/20 2120  X-Ray Foot Complete Left  1 time imaging      Ordered CASEY LOREDO    10/12/20 2120 10/12/20 2120  C-reactive protein  STAT  Collect    Ordered CASEY LOREDO    10/12/20 2120 10/12/20 2120  Sedimentation rate  STAT  Collect    Ordered CASEY LOREDO    10/12/20 2120 10/12/20 2120  Lactic " acid, plasma  STAT  Collect    Ordered CASEY LOREDO    10/12/20 2120 10/12/20 2120  Saline lock IV  Once      Ordered CASEY LOREDO            Virtual Visit Note: The provider triage portion of this emergency department evaluation and documentation was performed via [employer], a HIPAA-compliant telemedicine application, in concert with a tele-presenter in the room. A face to face patient evaluation with one of my colleagues will occur once the patient is placed in an emergency department room.      DISCLAIMER: This note was prepared with [employer] voice recognition transcription software. Garbled syntax, mangled pronouns, and other bizarre constructions may be attributed to that software system.

## 2020-10-13 NOTE — NURSING
"Pt ate cereal and milk at 0150 with diabetic diet order in place. New NPO diet placed at 0205. Paged [facility] team to inform them that patient ate prior to new NPO order placed. Team said " that's god that bhe ate; keep him NPO until notified otherwise just in case." Pt assessment and VS in flowsheets. No c/o pain noted. WCTM.   "

## 2020-10-13 NOTE — HPI
Billy Yatesn is a 55 y.o. male who  has a past medical history of Allergy, CAD (coronary artery disease), native coronary artery, Cancer, Diabetes mellitus, Diabetes mellitus, type 2, Disorder of kidney and ureter, Heart attack, colon cancer, stage I, Hyperlipidemia, Hypertension, Muscular pain, MICHAELA (obstructive sleep apnea), and Retinopathy due to secondary diabetes.    Patient admitted for worsening of the left toe wound over the last several days. He had acute swelling and redness to the second toe. Denies any pain, F/N/V/C at this time. He denies any history of trauma to the toes.

## 2020-10-13 NOTE — HPI
Billy Rivera is a 54 yo M with PMHx of insulin dependent T2DM, osteomyelitis of L 1st metatarsal s/p amputation on 7/4/20, PVA, CAD, HLD, and HTN who [place] [facility] [place] to new diabetic foot ulceration of L 2nd and 3rd toes. He says he was walking around today wearing a large and wide shoe - when he took his shoe and bandage off, he noticed a new ulceration and the 2nd toenail fell off. He has no pain sensation of the foot, but it is erythematous, edematous, and warm to touch. He has a remote history of poor wound healing after amputation of L great toe, multiple infections requiring antibiotics in the past few months due to polymicrobial species. Latest abx treatment with Ceftriaxone x10 days for GBS SSTI and PO Flagyl for Finegoldia osteomyelitis (End date 10/9/2020) He follows with podiatry outpatient. Pt denies fever, chills, fatigue, CP, abd pain, nausea/vomiting, diarrhea/constipation, urinary symptoms, or any other symptom at this time.     ED Course: Vitals: afebrile 97.9F, HR 78, 127/80, 98% on RA.  WBC 10.09, Lactate neg, increased inflammatory markers - CRP 21.7, Sed Rate 51  X-ray L foot: soft tissue ulcers with no evidence of osteomyelitis  Ceftriaxone given in ED. Admitted to hospital medicine for further management.

## 2020-10-13 NOTE — ASSESSMENT & PLAN NOTE
Pt is an insulin-dependent T2DM with poor glucose control associated with osteomyelitis of the L metatarsal requiring amputation and now presents with recurrent infections and ulcerations of the L foot.     - Last Hb A1c 11.5 on 8/12  - Home meds: Metformin 1000 BID   Glargine 55 nightly, Regular insulin 20 TIDWMM  - Patient says his glucose runs in the 190s at home  - Hold oral anti-glycemics while hospitalized  - While NPO, start 22U Detemir nightly  - Mod dose SSI  - Accu-checks per AC/HS protocol  - Goal Glucose 140-180, titrate insulin regimen as needed  - Diabetic diet

## 2020-10-13 NOTE — PROGRESS NOTES
Pharmacokinetic Initial Assessment: IV Vancomycin    Assessment/Plan:    Initiate intravenous vancomycin with loading dose of 2500 mg once followed by a maintenance dose of vancomycin 1750mg IV every 12 hours  Desired empiric serum trough concentration is 15 to 20 mcg/mL  Draw vancomycin trough level 60 min prior to fourth dose on 10/14 at approximately 1400  Pharmacy will continue to follow and monitor vancomycin.      Please contact pharmacy at extension [phone] with any questions regarding this assessment.     Thank you for the consult,   Helen Mancini       Patient brief summary:  Billy Rivera is a 55 y.o. male initiated on antimicrobial therapy with IV Vancomycin for treatment of suspected skin & soft tissue infection    Drug Allergies:   Review of patient's allergies indicates:   Allergen Reactions    Penicillins Other (See Comments)     PCN allergy as a child - was told he went into a coma. Tolerates Cefazolin without adverse reactions    Shellfish containing products      Other reaction(s): Unknown    Vancomycin Itching     Tolerated vancomycin 7/2020    Bactrim  [sulfamethoxazole-trimethoprim] Rash       Actual Body Weight:   117.9kg    Renal Function:   Estimated Creatinine Clearance: 93.8 mL/min (based on SCr of 1.2 mg/dL).,     Dialysis Method (if applicable):  N/A    CBC (last 72 hours):  Recent Labs   Lab Result Units 10/12/20  2137   WBC K/uL 10.09   Hemoglobin g/dL 13.8*   Hematocrit % 40.6   Platelets K/uL 261   Gran% % 68.4   Lymph% % 17.2*   Mono% % 12.3   Eosinophil% % 1.3   Basophil% % 0.4   Differential Method  Automated       Metabolic Panel (last 72 hours):  Recent Labs   Lab Result Units 10/12/20  2137   Sodium mmol/L 130*   Potassium mmol/L 4.0   Chloride mmol/L 97   CO2 mmol/L 22*   Glucose mg/dL 328*   BUN, Bld mg/dL 27*   Creatinine mg/dL 1.2   Albumin g/dL 3.5   Total Bilirubin mg/dL 0.7   Alkaline Phosphatase U/L 94   AST U/L 15   ALT U/L 16   Magnesium mg/dL 1.4*        Drug levels (last 3 results):  No results for input(s): VANCOMYCINRA, VANCOMYCINPE, VANCOMYCINTR in the last 72 hours.    Microbiologic Results:  Microbiology Results (last 7 days)     ** No results found for the last 168 hours. **

## 2020-10-13 NOTE — ASSESSMENT & PLAN NOTE
Plan:  - MRI left foot w/o contrast ordered to evaluate for extent of osteomyelitis for surgical planning.  - Patient appears amenable for resection of the infected bone to attempt to avoid long term antibiotics.   - Will discuss with staff pending scheduling of procedure.   - WBAT in surgical shoe.

## 2020-10-13 NOTE — PLAN OF CARE
CM met with patient and spouse to obtain discharge planning assessment.  Patient admitted with a diabetic foot ulcer.  Planned discharge is home with family - Plan (A) or home with family with home health - Plan (B).    PCP:  BRAD Baker MD     Payor:  Payor: [employer] / Plan: [employer]OURCES ([facility]) / Product Type: Commercial /      Pharmacy:    [facility] [address] [place] [address] [address] [place] [address]  [address] [facility] [address]  Phone: [phone] Fax: [phone]    [facility] [address] [address]  Phone: [phone] Fax: [phone]    [facility] - [address]  [address]  [facility] [address]  Phone: [phone] Fax: [phone]    10/13/20 1448   Discharge Assessment   Assessment Type Discharge Planning Assessment   Confirmed/corrected address and phone number on facesheet? Yes   Assessment information obtained from? Patient   Expected Length of Stay (days) 4   Communicated expected length of stay with patient/caregiver yes   Prior to hospitilization cognitive status: Alert/Oriented   Prior to hospitalization functional status: Independent   Current cognitive status: Alert/Oriented   Current Functional Status: Independent   Lives With spouse   Able to Return to Prior Arrangements yes   Is patient able to care for self after discharge? Yes   Who are your caregiver(s) and their phone number(s)? Toña Miguel - spouse [phone]   Patient's perception of discharge disposition home or selfcare   Readmission Within the Last 30 Days no previous admission in last 30 days   Patient currently being followed by outpatient case management? No   Patient currently receives any other outside agency services? No   Equipment Currently Used at Home none   Do you have any problems affording any of your prescribed medications? No   Is the patient taking medications as prescribed? yes   Does  the patient have transportation home? Yes   Transportation Anticipated family or friend will provide   Does the patient receive services at the [facility]? No   Discharge Plan A Home with family   Discharge Plan B Home with family;Home Health   DME Needed Upon Discharge  none   Patient/Family in Agreement with Plan yes

## 2020-10-13 NOTE — ED NOTES
Patient identifiers verified and correct for Billy Rivera.    LOC: The patient is awake, alert and aware of environment with an appropriate affect, the patient is oriented x 3 and speaking appropriately.  APPEARANCE: Patient resting comfortably and in no acute distress, patient is clean and well groomed, patient's clothing is properly fastened.  SKIN: The skin is warm and dry, color consistent with ethnicity, patient has normal skin turgor and moist mucus membranes, skin intact, no breakdown or bruising noted.  MUSCULOSKELETAL: Patient moving all extremities spontaneously. Second toe on right foot has a diabetic ulcer. Pt states he noticed it yesterday   RESPIRATORY: Airway is open and patent, respirations are spontaneous.  CARDIAC: Patient has a normal rate, no periphreal edema noted, capillary refill < 3 seconds.  ABDOMEN: Soft and non tender to palpation.

## 2020-10-13 NOTE — ASSESSMENT & PLAN NOTE
Pt with hyponatremia at 130 on admission. This seems to be a chronic hyponatremia with 130 around pt's baseline.    - 134 when corrected for hyperglycemia  - Euvolemic on exam  - Monitor with daily CMP, further w/u if decreasing

## 2020-10-13 NOTE — CONSULTS
[facility]  Podiatry  Consult Note    Patient Name: Billy Rivera  MRN: [id number]  Admission Date: 10/12/2020  Hospital Length of Stay: 1 days  Attending Physician: Dominic Mcbride MD  Primary Care Provider: BRAD Baker MD     Inpatient consult to Podiatry  Consult performed by: Kenneth Perez MD  Consult ordered by: Sissy Harris MD        Subjective:     History of Present Illness:  Billy Rivera is a 55 y.o. male who  has a past medical history of Allergy, CAD (coronary artery disease), native coronary artery, Cancer, Diabetes mellitus, Diabetes mellitus, type 2, Disorder of kidney and ureter, Heart attack, colon cancer, stage I, Hyperlipidemia, Hypertension, Muscular pain, MICHAELA (obstructive sleep apnea), and Retinopathy due to secondary diabetes.    Patient admitted for worsening of the left toe wound over the last several days. He had acute swelling and redness to the second toe. Denies any pain, F/N/V/C at this time. He denies any history of trauma to the toes.       Scheduled Meds:   aspirin  81 mg Oral Daily    atorvastatin  80 mg Oral Daily    carvediloL  12.5 mg Oral BID    ciprofloxacin HCl  750 mg Oral Q12H    enoxaparin  40 mg Subcutaneous Q24H    insulin detemir U-100  40 Units Subcutaneous QHS    magnesium sulfate IVPB  2 g Intravenous Q2H    vancomycin (VANCOCIN) IVPB  1,750 mg Intravenous Q12H     Continuous Infusions:  PRN Meds:acetaminophen, dextrose 50%, dextrose 50%, glucagon (human recombinant), glucose, glucose, insulin aspart U-100, melatonin, ondansetron, polyethylene glycol, sodium chloride 0.9%    Review of patient's allergies indicates:   Allergen Reactions    Penicillins Other (See Comments)     PCN allergy as a child - was told he went into a coma. Tolerates Cefazolin without adverse reactions    Shellfish containing products      Other reaction(s): Unknown    Vancomycin Itching     Tolerated vancomycin 7/2020    Bactrim   [sulfamethoxazole-trimethoprim] Rash        Past Medical History:   Diagnosis Date    Allergy     CAD (coronary artery disease), native coronary artery 6/25/2013    Cancer     Diabetes mellitus     Diabetes mellitus, type 2     Disorder of kidney and ureter     Heart attack 04/2012    Hx of colon cancer, stage I     Hyperlipidemia     Hypertension     Muscular pain     post-op after colonoscopy    MICHAELA (obstructive sleep apnea)     Retinopathy due to secondary diabetes      Past Surgical History:   Procedure Laterality Date    COLONOSCOPY N/A 2/17/2017    Procedure: COLONOSCOPY;  Surgeon: Simon Gomez MD;  Location: [facility] ENDO (4TH FLR);  Service: Endoscopy;  Laterality: N/A;    CORONARY ANGIOPLASTY      INCISION AND DRAINAGE FOOT Right 6/5/2018    Procedure: INCISION AND DRAINAGE, FOOT;  Surgeon: Bridget Santana DPM;  Location: [facility] OR 1ST FLR;  Service: Podiatry;  Laterality: Right;  Request mini C arm in room. request wound vac with medium black sponge    INCISION AND DRAINAGE FOOT Right 6/7/2018    Procedure: INCISION AND DRAINAGE, FOOT;  Surgeon: Emelia Brock DPM;  Location: [facility] OR 2ND FLR;  Service: Podiatry;  Laterality: Right;    INCISION AND DRAINAGE FOOT Left 9/4/2020    Procedure: INCISION AND DRAINAGE, FOOT;  Surgeon: Ainsley Powell DPM;  Location: [facility] OR 2ND FLR;  Service: Podiatry;  Laterality: Left;    INCISION AND DRAINAGE OF ABSCESS Right 6/2/2018    Procedure: INCISION AND DRAINAGE, ABSCESS;  Surgeon: Bridget Santana DPM;  Location: [facility] OR 2ND FLR;  Service: General;  Laterality: Right;    OSTEOTOMY OF METATARSAL BONE  9/4/2020    Procedure: OSTEOTOMY, METATARSAL BONE, 1st METATARSAL RESECTION;  Surgeon: Ainsley Powell DPM;  Location: [facility] OR 2ND FLR;  Service: Podiatry;;    REMOVAL OF FOREIGN BODY FROM FOOT Right 6/7/2018    Procedure: REMOVAL, FOREIGN BODY, FOOT;  Surgeon: Emelia Brock DPM;  Location: [facility] OR 2ND FLR;  Service: Podiatry;  Laterality: Right;     TOE AMPUTATION Left 7/4/2020    Procedure: AMPUTATION, TOE;  Surgeon: Bridget Santana DPM;  Location: [facility] OR [address] [place] [address];  Service: Podiatry;  Laterality: Left;    TONSILLECTOMY         Family History     Problem Relation (Age of Onset)    Heart disease Mother, Brother    No Known Problems Father        Tobacco Use    Smoking status: Never Smoker    Smokeless tobacco: Never Used   Substance and Sexual Activity    Alcohol use: Yes     Comment: rare x1 beer-     Drug use: No    Sexual activity: Not Currently     Review of Systems   Constitutional: Negative for chills and fever.   Cardiovascular: Positive for leg swelling.        Chronic swelling to the LLE     Gastrointestinal: Negative for nausea and vomiting.   Musculoskeletal: Positive for joint swelling.   Skin: Positive for color change and wound.        Chronic redness to the LLE     Objective:     Vital Signs (Most Recent):  Temp: 96.2 °F (35.7 °C) (10/13/20 0758)  Pulse: (!) 56 (10/13/20 0758)  Resp: 18 (10/13/20 0758)  BP: 122/76 (10/13/20 0758)  SpO2: 97 % (10/13/20 0758) Vital Signs (24h Range):  Temp:  [96.2 °F (35.7 °C)-97.9 °F (36.6 °C)] 96.2 °F (35.7 °C)  Pulse:  [56-78] 56  Resp:  [18-19] 18  SpO2:  [95 %-99 %] 97 %  BP: (122-139)/(64-86) 122/76     Weight: 117.9 kg (259 lb 14.8 oz)  Body mass index is 33.37 kg/m².    Foot Exam    Right Foot/Ankle     Inspection and Palpation  Ecchymosis: none  Tenderness: none   Swelling: none     Neurovascular  Dorsalis pedis: absent  Posterior tibial: absent  Saphenous nerve sensation: diminished  Tibial nerve sensation: diminished  Superficial peroneal nerve sensation: diminished  Deep peroneal nerve sensation: diminished  Sural nerve sensation: diminished      Left Foot/Ankle      Inspection and Palpation  Ecchymosis: none  Tenderness: none   Swelling: none     Neurovascular  Dorsalis pedis: absent  Posterior tibial: absent  Saphenous nerve sensation: diminished  Tibial nerve sensation:  diminished  Superficial peroneal nerve sensation: diminished  Deep peroneal nerve sensation: diminished  Sural nerve sensation: diminished            Laboratory:  CBC:   Recent Labs   Lab 10/13/20  0403   WBC 6.65   RBC 4.22*   HGB 12.3*   HCT 37.2*      MCV 88   MCH 29.1   MCHC 33.1     CMP:   Recent Labs   Lab 10/13/20  0402   *   CALCIUM 8.4*   ALBUMIN 3.0*   PROT 6.6   *   K 3.9   CO2 23   CL 97   BUN 24*   CREATININE 1.0   ALKPHOS 83   ALT 15   AST 15   BILITOT 0.4     CRP:   Recent Labs   Lab 10/12/20  2137   CRP 21.7*     ESR:   Recent Labs   Lab 10/12/20  2137   SEDRATE 55*       Diagnostic Results:  I have reviewed all pertinent imaging results/findings within the past 24 hours.  Imaging Results          X-Ray Foot Complete Left (Final result)  Result time 10/12/20 22:00:09    Final result by Pasha Hermosillo DO (10/12/20 22:00:09)                 Impression:      1. Second and third toe soft tissue ulcers without radiographic evidence of acute osteomyelitis.  Note that MRI is more sensitive.  2. Postoperative changes of amputation at the great metatarsal midshaft with interval callus.      Electronically signed by: Pasha Hermosillo  Date:    10/12/2020  Time:    22:00             Narrative:    EXAMINATION:  XR FOOT COMPLETE 3 VIEW LEFT    CLINICAL HISTORY:  Erythematous condition, unspecified.    TECHNIQUE:  AP, lateral and oblique views of the left foot were performed.    COMPARISON:  Left foot radiographs from 09/04/2020.    FINDINGS:  There are postoperative changes of great toe amputation at the metatarsal mid shaft with associated interval callus formation.  There is no acute fracture or dislocation of the left foot.  There are soft tissue ulcers of the distal 2nd and 3rd toes without underlying osseous abnormality or radiographic evidence of acute osteomyelitis.  There are small plantar and Achilles calcaneal enthesophytes.  There are vascular calcifications.                                 Clinical Findings:  Left 2nd toe ulceration at the distal tuft with overlying eschar. Upon debridement of the eschar, there is exposed distal phalanx. The right 3rd toe had a loose nail with a blister at the proximal nail fold. Upon removal of the nail, healthy granular base without wound is noted. He also has a superficial wound to the distal tuft of the 3rd toe.             Assessment/Plan:     * Type 2 diabetes mellitus with left diabetic foot ulcer  Plan:  - MRI left foot w/o contrast ordered to evaluate for extent of osteomyelitis for surgical planning.  - Patient appears amenable for resection of the infected bone to attempt to avoid long term antibiotics.   - Will discuss with staff pending scheduling of procedure.   - WBAT in surgical shoe.     PVD (peripheral vascular disease)  Per primary.    VAS VASILIY's from previous admission. Patient appears to have sufficient flow to the toes for healing.         Thank you for your consult. I will follow-up with patient. Please contact us if you have any additional questions.    Kenneth Perez MD  Podiatry  [facility]

## 2020-10-13 NOTE — ED PROVIDER NOTES
"Encounter Date: 10/12/2020       History     Chief Complaint   Patient presents with    Toe Pain     pt had left great toe amputated in Aug. Now reporting problems w/ second toe on left foot. Reports the toe nail feel off and it's red. States "it looks wet"     10:33 PM  Patient is a 54-year-old male with a history of DM type 2, osteomyelitis of L 1st toe s/p amputation, PVD, CAD, HLD, and HTN who presents to the ED with his wife with left foot ulcers, erythema, warmth, and edema.  States that he noted warmth and erythema of his leg 2 days ago.  Yesterday, he noted ulcers to his 2nd and 3rd toe.  He reports cleaning his foot appropriately and changing dressing daily.  Today at 20:30, his toenail to his left 2nd toe fell off.  He has not noted any fever.  He does not have have pain and states that he has neuropathy.  He was admitted last month for osteomyelitis and discharged on metronidazole (end date 10/9/2020)    Future Appointments  10/30/2020 10:30 AM   Akua Powell NP        [facility] IM        [facility]          Review of patient's allergies indicates:   Allergen Reactions    Penicillins Other (See Comments)     PCN allergy as a child - was told he went into a coma. Tolerates Cefazolin without adverse reactions    Shellfish containing products      Other reaction(s): Unknown    Vancomycin Itching     Tolerated vancomycin 7/2020    Bactrim  [sulfamethoxazole-trimethoprim] Rash     Past Medical History:   Diagnosis Date    Allergy     CAD (coronary artery disease), native coronary artery 6/25/2013    Cancer     Diabetes mellitus     Diabetes mellitus, type 2     Disorder of kidney and ureter     Heart attack 04/2012    Hx of colon cancer, stage I     Hyperlipidemia     Hypertension     Muscular pain     post-op after colonoscopy    MICHAELA (obstructive sleep apnea)     Retinopathy due to secondary diabetes      Past Surgical History:   Procedure Laterality Date    COLONOSCOPY N/A 2/17/2017    " Procedure: COLONOSCOPY;  Surgeon: Simon Gomez MD;  Location: [facility] ENDO (4TH FLR);  Service: Endoscopy;  Laterality: N/A;    CORONARY ANGIOPLASTY      INCISION AND DRAINAGE FOOT Right 6/5/2018    Procedure: INCISION AND DRAINAGE, FOOT;  Surgeon: Bridget Santana DPM;  Location: [facility] OR 1ST FLR;  Service: Podiatry;  Laterality: Right;  Request mini C arm in room. request wound vac with medium black sponge    INCISION AND DRAINAGE FOOT Right 6/7/2018    Procedure: INCISION AND DRAINAGE, FOOT;  Surgeon: Emelia Brock DPM;  Location: [facility] OR 2ND FLR;  Service: Podiatry;  Laterality: Right;    INCISION AND DRAINAGE FOOT Left 9/4/2020    Procedure: INCISION AND DRAINAGE, FOOT;  Surgeon: Ainsley Powell DPM;  Location: [facility] OR 2ND FLR;  Service: Podiatry;  Laterality: Left;    INCISION AND DRAINAGE OF ABSCESS Right 6/2/2018    Procedure: INCISION AND DRAINAGE, ABSCESS;  Surgeon: Bridget Santana DPM;  Location: [facility] OR 2ND FLR;  Service: General;  Laterality: Right;    OSTEOTOMY OF METATARSAL BONE  9/4/2020    Procedure: OSTEOTOMY, METATARSAL BONE, 1st METATARSAL RESECTION;  Surgeon: Ainsley Powell DPM;  Location: [facility] OR 2ND FLR;  Service: Podiatry;;    REMOVAL OF FOREIGN BODY FROM FOOT Right 6/7/2018    Procedure: REMOVAL, FOREIGN BODY, FOOT;  Surgeon: Emelia Brock DPM;  Location: [facility] OR 2ND FLR;  Service: Podiatry;  Laterality: Right;    TOE AMPUTATION Left 7/4/2020    Procedure: AMPUTATION, TOE;  Surgeon: Bridget Santana DPM;  Location: [facility] OR 2ND FLR;  Service: Podiatry;  Laterality: Left;    TONSILLECTOMY       Family History   Problem Relation Age of Onset    Heart disease Mother     No Known Problems Father     Heart disease Brother     Anesthesia problems Neg Hx      Social History     Tobacco Use    Smoking status: Never Smoker    Smokeless tobacco: Never Used   Substance Use Topics    Alcohol use: Yes     Comment: rare x1 beer-     Drug use: No     Review of Systems    Constitutional: Negative for chills, diaphoresis and fever.   HENT: Negative for sore throat.    Respiratory: Negative for shortness of breath.    Cardiovascular: Positive for leg swelling. Negative for chest pain.   Gastrointestinal: Negative for nausea.   Genitourinary: Negative for dysuria.   Musculoskeletal: Negative for back pain.   Skin: Positive for color change and wound. Negative for rash.   Neurological: Negative for weakness and headaches.   Hematological: Does not bruise/bleed easily.       Physical Exam     Initial Vitals [10/12/20 2111]   BP Pulse Resp Temp SpO2   127/80 78 19 97.9 °F (36.6 °C) 98 %      MAP       --         Physical Exam    Vitals reviewed.  Constitutional: He appears well-developed and well-nourished. He is not diaphoretic. He is cooperative.  Non-toxic appearance. He does not have a sickly appearance. He does not appear ill. No distress. Face mask in place.   HENT:   Head: Normocephalic and atraumatic.   Nose: Nose normal.   Eyes: Conjunctivae and EOM are normal.   Neck: Normal range of motion.   Cardiovascular: Normal rate.   Pulses:       Dorsalis pedis pulses are 2+ on the left side.   Pulmonary/Chest: No respiratory distress.   Abdominal: He exhibits no distension.   Musculoskeletal:      Comments: LLE edema. Erythema extending up to distal L knee. No significant TTP (reports neuropathy). +warmth.  Ulcer noted to 2nd and 3rd toe without significant drainage. No exposed bone.   Neurological: He is alert.   Skin: Skin is warm and dry. Lesion noted. There is erythema.   Psychiatric: He has a normal mood and affect.                 ED Course   Procedures  Labs Reviewed   CBC W/ AUTO DIFFERENTIAL - Abnormal; Notable for the following components:       Result Value    Hemoglobin 13.8 (*)     Mono # 1.2 (*)     Lymph% 17.2 (*)     All other components within normal limits   COMPREHENSIVE METABOLIC PANEL - Abnormal; Notable for the following components:    Sodium 130 (*)     CO2 22  (*)     Glucose 328 (*)     BUN, Bld 27 (*)     All other components within normal limits   C-REACTIVE PROTEIN - Abnormal; Notable for the following components:    CRP 21.7 (*)     All other components within normal limits   SEDIMENTATION RATE - Abnormal; Notable for the following components:    Sed Rate 55 (*)     All other components within normal limits   MAGNESIUM - Abnormal; Notable for the following components:    Magnesium 1.4 (*)     All other components within normal limits   LACTIC ACID, PLASMA   SARS-COV-2 RDRP GENE          Imaging Results          X-Ray Foot Complete Left (Final result)  Result time 10/12/20 22:00:09    Final result by Pasha Hermosillo DO (10/12/20 22:00:09)                 Impression:      1. Second and third toe soft tissue ulcers without radiographic evidence of acute osteomyelitis.  Note that MRI is more sensitive.  2. Postoperative changes of amputation at the great metatarsal midshaft with interval callus.      Electronically signed by: Pasha Hermosillo  Date:    10/12/2020  Time:    22:00             Narrative:    EXAMINATION:  XR FOOT COMPLETE 3 VIEW LEFT    CLINICAL HISTORY:  Erythematous condition, unspecified.    TECHNIQUE:  AP, lateral and oblique views of the left foot were performed.    COMPARISON:  Left foot radiographs from 09/04/2020.    FINDINGS:  There are postoperative changes of great toe amputation at the metatarsal mid shaft with associated interval callus formation.  There is no acute fracture or dislocation of the left foot.  There are soft tissue ulcers of the distal 2nd and 3rd toes without underlying osseous abnormality or radiographic evidence of acute osteomyelitis.  There are small plantar and Achilles calcaneal enthesophytes.  There are vascular calcifications.                                 Medical Decision Making:   History:   Old Medical Records: I decided to obtain old medical records.  Old Records Summarized: records from previous  admission(s).  Initial Assessment:   Patient is a 54-year-old male with a history of DM type 2, osteomyelitis of L 1st toe s/p amputation, PVD, CAD, HLD, and HTN who presents to the ED with his wife with left toes ulcers, erythema, warmth, and edema.    Differential Diagnosis:   Includes but is not limited to cellulitis, diabetic ulcer, osteomyelitis, venous insufficiency.  Clinical Tests:   Lab Tests: Reviewed  Radiological Study: Reviewed  ED Management:  Patient seen by provider in triage.  Labs and imaging ordered with I agree with.    CBC with WBC at 10.  No anemia.  CMP with chronic hyponatremia at 130.  Glucose elevated at 328.  No kidney injury.  Lactic acid within normal limits.  CRP and ESR elevated at 21 and 55, respectively.  Left foot x-ray with 2nd and 3rd toe soft tissue ulcers without evidence of osteomyelitis.    Given history, physical exam, and workup today, likely that patient has infected diabetic ulcers with significant cellulitis extending up his left lower leg.  His x-ray does not show signs of osteomyelitis, however his inflammatory markers are elevated.  Given the severity of his condition and multiple comorbidities, will place in observation with Hospital Medicine for further evaluation and management.  Case was discussed with them.  Patient with several allergies to antibiotics.  He was doing well on Rocephin when he was last admitted for similar symptoms.  Will give 1st dose here.  Other:   I have discussed this case with another health care provider.    I have reviewed patient's chart and discussed this case with my supervising MD.     Arabella Ballard PA-C  Emergent Department  [facility]  Spectralink #[id number] or #[id number]                               Clinical Impression:       ICD-10-CM ICD-9-CM   1. Type 2 diabetes mellitus with left diabetic foot ulcer  E11.621 250.80    L97.529 707.15   2. Redness  L53.9 695.9   3. Left leg cellulitis  L03.116 682.6                       Disposition:   Disposition: Admitted  Condition: Stable     ED Disposition Condition    Admit                             Arabella Ballard PA-C  10/13/20 0021

## 2020-10-13 NOTE — SUBJECTIVE & OBJECTIVE
Scheduled Meds:   aspirin  81 mg Oral Daily    atorvastatin  80 mg Oral Daily    carvediloL  12.5 mg Oral BID    ciprofloxacin HCl  750 mg Oral Q12H    enoxaparin  40 mg Subcutaneous Q24H    insulin detemir U-100  40 Units Subcutaneous QHS    magnesium sulfate IVPB  2 g Intravenous Q2H    vancomycin (VANCOCIN) IVPB  1,750 mg Intravenous Q12H     Continuous Infusions:  PRN Meds:acetaminophen, dextrose 50%, dextrose 50%, glucagon (human recombinant), glucose, glucose, insulin aspart U-100, melatonin, ondansetron, polyethylene glycol, sodium chloride 0.9%    Review of patient's allergies indicates:   Allergen Reactions    Penicillins Other (See Comments)     PCN allergy as a child - was told he went into a coma. Tolerates Cefazolin without adverse reactions    Shellfish containing products      Other reaction(s): Unknown    Vancomycin Itching     Tolerated vancomycin 7/2020    Bactrim  [sulfamethoxazole-trimethoprim] Rash        Past Medical History:   Diagnosis Date    Allergy     CAD (coronary artery disease), native coronary artery 6/25/2013    Cancer     Diabetes mellitus     Diabetes mellitus, type 2     Disorder of kidney and ureter     Heart attack 04/2012    Hx of colon cancer, stage I     Hyperlipidemia     Hypertension     Muscular pain     post-op after colonoscopy    MICHAELA (obstructive sleep apnea)     Retinopathy due to secondary diabetes      Past Surgical History:   Procedure Laterality Date    COLONOSCOPY N/A 2/17/2017    Procedure: COLONOSCOPY;  Surgeon: Simon Gomez MD;  Location: [facility] (4TH FLR);  Service: Endoscopy;  Laterality: N/A;    CORONARY ANGIOPLASTY      INCISION AND DRAINAGE FOOT Right 6/5/2018    Procedure: INCISION AND DRAINAGE, FOOT;  Surgeon: Bridget Santana DPM;  Location: [address];  Service: Podiatry;  Laterality: Right;  Request mini C arm in room. request wound vac with medium black sponge    INCISION AND DRAINAGE FOOT Right 6/7/2018     Procedure: INCISION AND DRAINAGE, FOOT;  Surgeon: Emelia Brock DPM;  Location: [facility] OR 2ND FLR;  Service: Podiatry;  Laterality: Right;    INCISION AND DRAINAGE FOOT Left 9/4/2020    Procedure: INCISION AND DRAINAGE, FOOT;  Surgeon: Ainsley Powell DPM;  Location: [facility] OR 2ND FLR;  Service: Podiatry;  Laterality: Left;    INCISION AND DRAINAGE OF ABSCESS Right 6/2/2018    Procedure: INCISION AND DRAINAGE, ABSCESS;  Surgeon: Bridget Santana DPM;  Location: [facility] OR 2ND FLR;  Service: General;  Laterality: Right;    OSTEOTOMY OF METATARSAL BONE  9/4/2020    Procedure: OSTEOTOMY, METATARSAL BONE, 1st METATARSAL RESECTION;  Surgeon: Ainsley Powell DPM;  Location: [facility] OR 2ND FLR;  Service: Podiatry;;    REMOVAL OF FOREIGN BODY FROM FOOT Right 6/7/2018    Procedure: REMOVAL, FOREIGN BODY, FOOT;  Surgeon: Emelia Brock DPM;  Location: [facility] OR 2ND FLR;  Service: Podiatry;  Laterality: Right;    TOE AMPUTATION Left 7/4/2020    Procedure: AMPUTATION, TOE;  Surgeon: Bridget Santana DPM;  Location: [facility] OR 2ND FLR;  Service: Podiatry;  Laterality: Left;    TONSILLECTOMY         Family History     Problem Relation (Age of Onset)    Heart disease Mother, Brother    No Known Problems Father        Tobacco Use    Smoking status: Never Smoker    Smokeless tobacco: Never Used   Substance and Sexual Activity    Alcohol use: Yes     Comment: rare x1 beer-     Drug use: No    Sexual activity: Not Currently     Review of Systems   Constitutional: Negative for chills and fever.   Cardiovascular: Positive for leg swelling.        Chronic swelling to the LLE     Gastrointestinal: Negative for nausea and vomiting.   Musculoskeletal: Positive for joint swelling.   Skin: Positive for color change and wound.        Chronic redness to the LLE     Objective:     Vital Signs (Most Recent):  Temp: 96.2 °F (35.7 °C) (10/13/20 0758)  Pulse: (!) 56 (10/13/20 0758)  Resp: 18 (10/13/20 0758)  BP: 122/76 (10/13/20 0758)  SpO2: 97 %  (10/13/20 0758) Vital Signs (24h Range):  Temp:  [96.2 °F (35.7 °C)-97.9 °F (36.6 °C)] 96.2 °F (35.7 °C)  Pulse:  [56-78] 56  Resp:  [18-19] 18  SpO2:  [95 %-99 %] 97 %  BP: (122-139)/(64-86) 122/76     Weight: 117.9 kg (259 lb 14.8 oz)  Body mass index is 33.37 kg/m².    Foot Exam    Right Foot/Ankle     Inspection and Palpation  Ecchymosis: none  Tenderness: none   Swelling: none     Neurovascular  Dorsalis pedis: absent  Posterior tibial: absent  Saphenous nerve sensation: diminished  Tibial nerve sensation: diminished  Superficial peroneal nerve sensation: diminished  Deep peroneal nerve sensation: diminished  Sural nerve sensation: diminished      Left Foot/Ankle      Inspection and Palpation  Ecchymosis: none  Tenderness: none   Swelling: none     Neurovascular  Dorsalis pedis: absent  Posterior tibial: absent  Saphenous nerve sensation: diminished  Tibial nerve sensation: diminished  Superficial peroneal nerve sensation: diminished  Deep peroneal nerve sensation: diminished  Sural nerve sensation: diminished            Laboratory:  CBC:   Recent Labs   Lab 10/13/20  0403   WBC 6.65   RBC 4.22*   HGB 12.3*   HCT 37.2*      MCV 88   MCH 29.1   MCHC 33.1     CMP:   Recent Labs   Lab 10/13/20  0402   *   CALCIUM 8.4*   ALBUMIN 3.0*   PROT 6.6   *   K 3.9   CO2 23   CL 97   BUN 24*   CREATININE 1.0   ALKPHOS 83   ALT 15   AST 15   BILITOT 0.4     CRP:   Recent Labs   Lab 10/12/20  2137   CRP 21.7*     ESR:   Recent Labs   Lab 10/12/20  2137   SEDRATE 55*       Diagnostic Results:  I have reviewed all pertinent imaging results/findings within the past 24 hours.  Imaging Results          X-Ray Foot Complete Left (Final result)  Result time 10/12/20 22:00:09    Final result by Pasha Hermosillo DO (10/12/20 22:00:09)                 Impression:      1. Second and third toe soft tissue ulcers without radiographic evidence of acute osteomyelitis.  Note that MRI is more sensitive.  2. Postoperative  changes of amputation at the great metatarsal midshaft with interval callus.      Electronically signed by: Pasha Hermosillo  Date:    10/12/2020  Time:    22:00             Narrative:    EXAMINATION:  XR FOOT COMPLETE 3 VIEW LEFT    CLINICAL HISTORY:  Erythematous condition, unspecified.    TECHNIQUE:  AP, lateral and oblique views of the left foot were performed.    COMPARISON:  Left foot radiographs from 09/04/2020.    FINDINGS:  There are postoperative changes of great toe amputation at the metatarsal mid shaft with associated interval callus formation.  There is no acute fracture or dislocation of the left foot.  There are soft tissue ulcers of the distal 2nd and 3rd toes without underlying osseous abnormality or radiographic evidence of acute osteomyelitis.  There are small plantar and Achilles calcaneal enthesophytes.  There are vascular calcifications.                                Clinical Findings:  Left 2nd toe ulceration at the distal tuft with overlying eschar. Upon debridement of the eschar, there is exposed distal phalanx. The right 3rd toe had a loose nail with a blister at the proximal nail fold. Upon removal of the nail, healthy granular base without wound is noted. He also has a superficial wound to the distal tuft of the 3rd toe.

## 2020-10-13 NOTE — PROGRESS NOTES
Time Spent: 5 minutes  Learners: Patient    Current HbA1c: 10.8  Is patient aware of their A1c and their goal A1c? yes  Home diabetes medication(s): Insulin, Metformin    Nutrition Education with handouts: Carbohydrate counting for people with diabetes, Meal planning using the plate method.    Comments: Pt and wife were in room, informed him that I was there for a diabetic education. Said that he had gone through this before and didn't really want to do it again but, would take the handouts and if there were questions later he would get into contact with RD.    Barriers to Learning: Uninterested in change.    Follow up: Yes  Please consult as needed.  Thank you!

## 2020-10-13 NOTE — ASSESSMENT & PLAN NOTE
Patient presents to [facility] ED due to new diabetic foot ulcers on L 2nd and 2rd metatarsal heads. Not associated with pain, no fever, WBC WNL, X-ray shows no evidence of osteomyelitis, but patient has history of recurrent infections including osteomyelitis of the L foot in the past. The L foot is edematous, erythematous and warm to touch with elevated inflammatory markers Sed Rate 51, CRP 21.7. Patient admitted to IM1 for further evaluation.    - Received 1 dose Rocephin in the ED  - Vanc & Cipro started for empiric soft tissue infection coverage  - Podiatry consulted  - Will defer to podiatry if MRI or wound cultures are necessary  - NPO until podiatry evaluates for possible amputation of 2nd metatarsal  - Strict glucose control to promote wound healing

## 2020-10-13 NOTE — ANESTHESIA PREPROCEDURE EVALUATION
[facility]  Anesthesia Pre-Operative Evaluation         Patient Name: Billy Rivera  YOB: 1965  MRN: [id number]    SUBJECTIVE:     Pre-operative evaluation for Procedure(s) (LRB):  AMPUTATION, TOE (Left)     10/13/2020    Billy Rivera is a 55 y.o. male w/ a significant PMHx of insulin dependent T2DM, osteomyelitis of L 1st metatarsal s/p amputation on 7/4/20, past h/o stage 1 colon cancer, PVD, CAD s/p CABG, prior COVID infection, HLD, HTN, MICHAELA who presented to [facility] ED due to new diabetic foot ulceration of L 2nd and 3rd toes.    Patient now presents for the above procedure(s).    Cath Lab 6/7/20:  -The peripheral vessels are all normal.   -3 vessel runoff: Abd aorta, L CFA, R CFA    TTE 5/23/20:    1 - Normal left ventricular function (EF 60%).     2 - Concentric remodeling.     3 - Normal diastolic function.     4 - Normal right ventricular function .     LDA:        Peripheral IV - Single Lumen 10/12/20 1130 18 G Right Antecubital (Active)   Site Assessment Clean;Dry;Intact 10/13/20 0715   Line Status Flushed;Saline locked 10/13/20 0715   Dressing Status Clean;Intact;Dry 10/13/20 0715   Dressing Intervention Integrity maintained 10/13/20 0715   Dressing Change Due 10/16/20 10/13/20 0715   Site Change Due 10/16/20 10/13/20 0715   Number of days: 1       Prev airway:   Placement Date: 04/05/17; Placement Time: 1404; Method of Intubation: Roa; Inserted by: Anesthesia Resident; Airway Device: Endotracheal Tube; Mask Ventilation: Easy - oral; Intubated: Postinduction; Blade:  (Mc grath 4 ); Airway Device Size: 8.0; Style: Cuffed; Cuff Inflation: Minimal occlusive pressure; Placement Verified By: Auscultation, Capnometry; Grade: Grade I; Complicating Factors: Large/Floppy epiglottis, Oropharyngeal edema or fat, Obesity; Intubation Findings: Positive EtCO2, Bilateral breath sounds, Atraumatic/Condition of teeth unchanged; Complications: None; Removal Date: 04/05/17;   Removal Time: 1709    Drips: None documented.      Patient Active Problem List   Diagnosis    Essential hypertension    NSTEMI May 2013 - peak troponin 0.22    Dyslipidemia    Coronary artery disease involving native coronary artery of native heart without angina pectoris    BDR (background diabetic retinopathy) - Both Eyes    Uncontrolled type II diabetes mellitus    PVD (peripheral vascular disease)    Sleep apnea    Proteinuria    Edema    Hypertension associated with diabetes    Type 2 diabetes mellitus with diabetic peripheral angiopathy without gangrene, with long-term current use of insulin    Onychomycosis of multiple toenails with type 2 diabetes mellitus    Type 2 diabetes mellitus 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, with long-term current use of insulin    Hyponatremia    Anemia    Hypomagnesemia    Diabetic foot ulcer associated with type 2 diabetes mellitus    Insomnia    Hx of colon cancer, stage I    Dyslipidemia associated with type 2 diabetes mellitus    Diabetes mellitus with insulin therapy    Obesity, diabetes, and hypertension syndrome    Class 2 severe obesity with body mass index (BMI) of 35 to 39.9 with serious comorbidity    Diabetes mellitus with microalbuminuria    Osteomyelitis of great toe of left foot    Discharge planning issues    Amputation of left great toe    Diabetes mellitus with peripheral circulatory disorder    Osteomyelitis    Diabetic foot infection    COVID-19 virus detected    Acute osteomyelitis of left foot    Coronavirus infection    Coronary artery disease involving coronary bypass graft of native heart without angina pectoris    Type 2 diabetes mellitus with left diabetic foot ulcer       Review of patient's allergies indicates:   Allergen Reactions    Penicillins Other (See Comments)     PCN allergy as a child - was told he went into a coma. Tolerates Cefazolin without adverse  "reactions    Shellfish containing products      Other reaction(s): Unknown    Vancomycin Itching     Tolerated vancomycin 7/2020    Bactrim  [sulfamethoxazole-trimethoprim] Rash       Current Inpatient Medications:   aspirin  81 mg Oral Daily    atorvastatin  80 mg Oral Daily    carvediloL  12.5 mg Oral BID    ciprofloxacin HCl  750 mg Oral Q12H    enoxaparin  40 mg Subcutaneous Q24H    insulin detemir U-100  40 Units Subcutaneous QHS    vancomycin (VANCOCIN) IVPB  1,750 mg Intravenous Q12H       No current facility-administered medications on file prior to encounter.      Current Outpatient Medications on File Prior to Encounter   Medication Sig Dispense Refill    aspirin (ECOTRIN) 81 MG EC tablet Take 1 tablet (81 mg total) by mouth once daily.  0    atorvastatin (LIPITOR) 80 MG tablet Take 1 tablet (80 mg total) by mouth once daily. 30 tablet 6    BIOTIN ORAL Take 1 capsule by mouth once daily.      blood sugar diagnostic Strp Strips and lancets    Use before meals and bedtime 150 strip 12    carvediloL (COREG) 12.5 MG tablet Take 1 tablet (12.5 mg total) by mouth 2 (two) times daily. 60 tablet 11    insulin glargine (LANTUS) 100 unit/mL injection Inject 55 Units into the skin every evening. 20 mL 11    insulin regular 100 unit/mL Inj injection Inject 20 Units into the skin 3 (three) times daily before meals.  12    lisinopriL (PRINIVIL,ZESTRIL) 40 MG tablet Take 1 tablet (40 mg total) by mouth once daily. 30 tablet 6    metFORMIN (GLUCOPHAGE) 1000 MG tablet TAKE 1 TABLET BY MOUTH TWICE DAILY WITH MEALS 60 tablet 0    pen needle, diabetic 31 gauge x 3/16" Ndle Inject 1 each into the skin 3 (three) times daily before meals. (Patient taking differently: Inject 1 each into the skin 4 (four) times daily. ) 100 each 12       Past Surgical History:   Procedure Laterality Date    COLONOSCOPY N/A 2/17/2017    Procedure: COLONOSCOPY;  Surgeon: Simon Gomez MD;  Location: [facility] ([address] [place] [address]);  " Service: Endoscopy;  Laterality: N/A;    CORONARY ANGIOPLASTY      INCISION AND DRAINAGE FOOT Right 6/5/2018    Procedure: INCISION AND DRAINAGE, FOOT;  Surgeon: Bridget Santana DPM;  Location: [facility] OR 1ST FLR;  Service: Podiatry;  Laterality: Right;  Request mini C arm in room. request wound vac with medium black sponge    INCISION AND DRAINAGE FOOT Right 6/7/2018    Procedure: INCISION AND DRAINAGE, FOOT;  Surgeon: Emelia Brock DPM;  Location: [facility] OR 2ND FLR;  Service: Podiatry;  Laterality: Right;    INCISION AND DRAINAGE FOOT Left 9/4/2020    Procedure: INCISION AND DRAINAGE, FOOT;  Surgeon: Ainsley Powell DPM;  Location: [facility] OR 2ND FLR;  Service: Podiatry;  Laterality: Left;    INCISION AND DRAINAGE OF ABSCESS Right 6/2/2018    Procedure: INCISION AND DRAINAGE, ABSCESS;  Surgeon: Bridget Santana DPM;  Location: [facility] OR 2ND FLR;  Service: General;  Laterality: Right;    OSTEOTOMY OF METATARSAL BONE  9/4/2020    Procedure: OSTEOTOMY, METATARSAL BONE, 1st METATARSAL RESECTION;  Surgeon: Ainsley Powell DPM;  Location: [facility] OR 2ND FLR;  Service: Podiatry;;    REMOVAL OF FOREIGN BODY FROM FOOT Right 6/7/2018    Procedure: REMOVAL, FOREIGN BODY, FOOT;  Surgeon: Emelia Brock DPM;  Location: [facility] OR 2ND FLR;  Service: Podiatry;  Laterality: Right;    TOE AMPUTATION Left 7/4/2020    Procedure: AMPUTATION, TOE;  Surgeon: Bridget Santana DPM;  Location: [facility] OR 2ND FLR;  Service: Podiatry;  Laterality: Left;    TONSILLECTOMY         Social History     Socioeconomic History    Marital status:      Spouse name: Not on file    Number of children: Not on file    Years of education: Not on file    Highest education level: Not on file   Occupational History    Not on file   Social Needs    Financial resource strain: Not on file    Food insecurity     Worry: Not on file     Inability: Not on file    Transportation needs     Medical: Not on file     Non-medical: Not on file   Tobacco Use     Smoking status: Never Smoker    Smokeless tobacco: Never Used   Substance and Sexual Activity    Alcohol use: Yes     Comment: rare x1 beer-     Drug use: No    Sexual activity: Not Currently   Lifestyle    Physical activity     Days per week: Not on file     Minutes per session: Not on file    Stress: Not on file   Relationships    Social connections     Talks on phone: Not on file     Gets together: Not on file     Attends Buddhism service: Not on file     Active member of club or organization: Not on file     Attends meetings of clubs or organizations: Not on file     Relationship status: Not on file   Other Topics Concern    Not on file   Social History Narrative    Not on file       OBJECTIVE:     Vital Signs Range (Last 24H):  Temp:  [35.7 °C (96.2 °F)-36.6 °C (97.9 °F)]   Pulse:  [55-78]   Resp:  [18-19]   BP: (122-139)/(64-86)   SpO2:  [95 %-99 %]       Significant Labs:  Lab Results   Component Value Date    WBC 6.65 10/13/2020    HGB 12.3 (L) 10/13/2020    HCT 37.2 (L) 10/13/2020     10/13/2020    CHOL 144 08/12/2020    TRIG 192 (H) 08/12/2020    HDL 39 (L) 08/12/2020    ALT 15 10/13/2020    AST 15 10/13/2020     (L) 10/13/2020    K 3.9 10/13/2020    CL 97 10/13/2020    CREATININE 1.0 10/13/2020    BUN 24 (H) 10/13/2020    CO2 23 10/13/2020    TSH 1.554 08/12/2020    PSA 0.29 08/12/2020    INR 1.0 10/13/2020    HGBA1C 10.8 (H) 10/13/2020       Diagnostic Studies: No relevant studies.    EKG:   Results for orders placed or performed during the hospital encounter of 03/13/17   EKG 12-lead    Collection Time: 03/13/17  3:14 PM    Narrative    Test Reason : C18.7  Blood Pressure :   Vent. Rate : 052 BPM     Atrial Rate : 052 BPM     P-R Int : 160 ms          QRS Dur : 100 ms      QT Int : 434 ms       P-R-T Axes : 027 -29 031 degrees     QTc Int : 403 ms    Sinus bradycardia  Left axis deviation  When compared with ECG of 24-MAY-2013 03:32,  Premature atrial complexes are no longer  Present  T wave inversion no longer evident in Anterior leads  Confirmed by ARIEL AMEZCUA MD ([id number]) on 3/13/2017 5:58:53 PM    Referred By: AIDA CHAPMAN           Confirmed By:ARIEL AMEZCUA MD       2D ECHO:  TTE:  No results found for this or any previous visit.    SANDRA:  No results found for this or any previous visit.    ASSESSMENT/PLAN:       Anesthesia Evaluation    I have reviewed the Patient Summary Reports.    I have reviewed the Nursing Notes. I have reviewed the NPO Status.   I have reviewed the Medications.     Review of Systems  Anesthesia Hx:  History of prior surgery of interest to airway management or planning:  Denies Personal Hx of Anesthesia complications.   Hematology/Oncology:         -- Anemia:   Cardiovascular:   Hypertension Past MI CAD asymptomatic CABG/stent  PVD hyperlipidemia    Pulmonary:   Sleep Apnea    Renal/:   Chronic Renal Disease    Hepatic/GI:  Hepatic/GI Normal    Neurological:  Neurology Normal    Endocrine:   Diabetes, poorly controlled, type 2, using insulin    Psych:  Psychiatric Normal           Physical Exam  General:  Well nourished    Airway/Jaw/Neck:  Airway Findings: Mouth Opening: Normal Tongue: Normal  General Airway Assessment: Adult  Mallampati: II  TM Distance: Normal, at least 6 cm  Jaw/Neck Findings:  Neck ROM: Normal ROM      Dental:  Dental Findings: In tact    Chest/Lungs:  Chest/Lungs Findings: Clear to auscultation, Normal Respiratory Rate     Heart/Vascular:  Heart Findings: Normal    Abdomen:  Abdomen Findings: Normal    Musculoskeletal:  Musculoskeletal Findings: Normal   Skin:  Skin Findings:     Mental Status:  Mental Status Findings:  Cooperative, Alert and Oriented         Anesthesia Plan  Type of Anesthesia, risks & benefits discussed:  Anesthesia Type:  general, MAC, regional  Patient's Preference: MAC  Intra-op Monitoring Plan: standard ASA monitors  Intra-op Monitoring Plan Comments:   Post Op Pain Control Plan: per primary service following discharge from  PACU, multimodal analgesia and IV/PO Opioids PRN  Post Op Pain Control Plan Comments:   Induction:   IV  Beta Blocker:  Patient is on a Beta-Blocker and has received one dose within the past 24 hours (No further documentation required).       Informed Consent:    ASA Score: 3     Day of Surgery Review of History & Physical: I have interviewed and examined the patient. I have reviewed the patient's H&P dated: 10/13/20.   H&P update referred to the surgeon.         Ready For Surgery From Anesthesia Perspective.

## 2020-10-13 NOTE — PLAN OF CARE
Problem: Fall Injury Risk  Goal: Absence of Fall and Fall-Related Injury  Outcome: Ongoing, Progressing     Problem: Diabetes Comorbidity  Goal: Blood Glucose Level Within Desired Range  Outcome: Ongoing, Progressing     Problem: Wound  Goal: Optimal Wound Healing  Outcome: Ongoing, Progressing  Intervention: Promote Effective Wound Healing  Flowsheets (Taken 10/13/2020 0336)  Sleep/Rest Enhancement: awakenings minimized  Pain Management Interventions: care clustered

## 2020-10-13 NOTE — H&P
"[facility] Medicine  History & Physical    Patient Name: Billy Rivera  MRN: [id number]  Admission Date: 10/12/2020  Attending Physician: Dominic Mcbride MD   Primary Care Provider: BRAD Baker MD         Patient information was obtained from patient, past medical records and ER records.     Subjective:     Principal Problem:Type 2 diabetes mellitus with left diabetic foot ulcer    Chief Complaint:   Chief Complaint   Patient presents with    Toe Pain     pt had left great toe amputated in Aug. Now reporting problems w/ second toe on left foot. Reports the toe nail feel off and it's red. States "it looks wet"        HPI: Billy Rivera is a 54 yo M with PMHx of insulin dependent T2DM, osteomyelitis of L 1st metatarsal s/p amputation on 7/4/20, PVA, CAD, HLD, and HTN who presented to [facility] ED due to new diabetic foot ulceration of L 2nd and 3rd toes. He says he was walking around today wearing a large and wide shoe - when he took his shoe and bandage off, he noticed a new ulceration and the 2nd toenail fell off. He has no pain sensation of the foot, but it is erythematous, edematous, and warm to touch. He has a remote history of poor wound healing after amputation of L great toe, multiple infections requiring antibiotics in the past few months due to polymicrobial species. Latest abx treatment with Ceftriaxone x10 days for GBS SSTI and PO Flagyl for Finegoldia osteomyelitis (End date 10/9/2020) He follows with podiatry outpatient. Pt denies fever, chills, fatigue, CP, abd pain, nausea/vomiting, diarrhea/constipation, urinary symptoms, or any other symptom at this time.     ED Course: Vitals: afebrile 97.9F, HR 78, 127/80, 98% on RA.  WBC 10.09, Lactate neg, increased inflammatory markers - CRP 21.7, Sed Rate 51  X-ray L foot: soft tissue ulcers with no evidence of osteomyelitis  Ceftriaxone given in ED. Admitted to hospital medicine for further management.    Past Medical " History:   Diagnosis Date    Allergy     CAD (coronary artery disease), native coronary artery 6/25/2013    Cancer     Diabetes mellitus     Diabetes mellitus, type 2     Disorder of kidney and ureter     Heart attack 04/2012    Hx of colon cancer, stage I     Hyperlipidemia     Hypertension     Muscular pain     post-op after colonoscopy    MICHAELA (obstructive sleep apnea)     Retinopathy due to secondary diabetes        Past Surgical History:   Procedure Laterality Date    COLONOSCOPY N/A 2/17/2017    Procedure: COLONOSCOPY;  Surgeon: Simon Gomez MD;  Location: [facility] ENDO (4TH FLR);  Service: Endoscopy;  Laterality: N/A;    CORONARY ANGIOPLASTY      INCISION AND DRAINAGE FOOT Right 6/5/2018    Procedure: INCISION AND DRAINAGE, FOOT;  Surgeon: Bridget Santana DPM;  Location: [facility] OR 1ST FLR;  Service: Podiatry;  Laterality: Right;  Request mini C arm in room. request wound vac with medium black sponge    INCISION AND DRAINAGE FOOT Right 6/7/2018    Procedure: INCISION AND DRAINAGE, FOOT;  Surgeon: Emelia Brock DPM;  Location: [facility] OR 2ND FLR;  Service: Podiatry;  Laterality: Right;    INCISION AND DRAINAGE FOOT Left 9/4/2020    Procedure: INCISION AND DRAINAGE, FOOT;  Surgeon: Ainsley Powell DPM;  Location: [facility] OR 2ND FLR;  Service: Podiatry;  Laterality: Left;    INCISION AND DRAINAGE OF ABSCESS Right 6/2/2018    Procedure: INCISION AND DRAINAGE, ABSCESS;  Surgeon: Bridget Santana DPM;  Location: [facility] OR 2ND FLR;  Service: General;  Laterality: Right;    OSTEOTOMY OF METATARSAL BONE  9/4/2020    Procedure: OSTEOTOMY, METATARSAL BONE, 1st METATARSAL RESECTION;  Surgeon: Ainsley Powell DPM;  Location: [facility] OR 2ND FLR;  Service: Podiatry;;    REMOVAL OF FOREIGN BODY FROM FOOT Right 6/7/2018    Procedure: REMOVAL, FOREIGN BODY, FOOT;  Surgeon: Emelia Brock DPM;  [facility];  Service: Podiatry;  Laterality: Right;    TOE AMPUTATION Left 7/4/2020    Procedure: AMPUTATION,  "TOE;  Surgeon: Bridget Santana DPM;  Location: [facility] OR [address] [place] [address];  Service: Podiatry;  Laterality: Left;    TONSILLECTOMY         Review of patient's allergies indicates:   Allergen Reactions    Penicillins Other (See Comments)     PCN allergy as a child - was told he went into a coma. Tolerates Cefazolin without adverse reactions    Shellfish containing products      Other reaction(s): Unknown    Vancomycin Itching     Tolerated vancomycin 7/2020    Bactrim  [sulfamethoxazole-trimethoprim] Rash       No current facility-administered medications on file prior to encounter.      Current Outpatient Medications on File Prior to Encounter   Medication Sig    aspirin (ECOTRIN) 81 MG EC tablet Take 1 tablet (81 mg total) by mouth once daily.    atorvastatin (LIPITOR) 80 MG tablet Take 1 tablet (80 mg total) by mouth once daily.    BIOTIN ORAL Take 1 capsule by mouth once daily.    blood sugar diagnostic Strp Strips and lancets    Use before meals and bedtime    carvediloL (COREG) 12.5 MG tablet Take 1 tablet (12.5 mg total) by mouth 2 (two) times daily.    insulin glargine (LANTUS) 100 unit/mL injection Inject 55 Units into the skin every evening.    insulin regular 100 unit/mL Inj injection Inject 20 Units into the skin 3 (three) times daily before meals.    lisinopriL (PRINIVIL,ZESTRIL) 40 MG tablet Take 1 tablet (40 mg total) by mouth once daily.    metFORMIN (GLUCOPHAGE) 1000 MG tablet TAKE 1 TABLET BY MOUTH TWICE DAILY WITH MEALS    pen needle, diabetic 31 gauge x 3/16" Ndle Inject 1 each into the skin 3 (three) times daily before meals. (Patient taking differently: Inject 1 each into the skin 4 (four) times daily. )     Family History     Problem Relation (Age of Onset)    Heart disease Mother, Brother    No Known Problems Father        Tobacco Use    Smoking status: Never Smoker    Smokeless tobacco: Never Used   Substance and Sexual Activity    Alcohol use: Yes     Comment: rare x1 beer-     " Drug use: No    Sexual activity: Not Currently     Review of Systems   Constitutional: Negative for chills, fatigue and fever.   HENT: Negative for congestion, rhinorrhea and sore throat.    Eyes: Negative.    Respiratory: Negative for cough, shortness of breath and wheezing.    Cardiovascular: Negative for chest pain and leg swelling.   Gastrointestinal: Negative for abdominal pain, constipation, diarrhea, nausea and vomiting.   Genitourinary: Negative for dysuria, frequency and urgency.   Musculoskeletal: Negative for arthralgias and myalgias.   Skin: Positive for color change and wound. Negative for rash.        L 2nd and 3rd toe ulceration with associated erythema to mid-calf and soft-tissue edema   Neurological: Negative for weakness and headaches.   Psychiatric/Behavioral: Negative for agitation and confusion.     Objective:     Vital Signs (Most Recent):  Temp: 97.9 °F (36.6 °C) (10/12/20 2111)  Pulse: 78 (10/12/20 2111)  Resp: 19 (10/12/20 2111)  BP: 127/80 (10/12/20 2111)  SpO2: 98 % (10/12/20 2111) Vital Signs (24h Range):  Temp:  [97.9 °F (36.6 °C)] 97.9 °F (36.6 °C)  Pulse:  [78] 78  Resp:  [19] 19  SpO2:  [98 %] 98 %  BP: (127)/(80) 127/80     Weight: 117.9 kg (260 lb)  Body mass index is 33.38 kg/m².    Physical Exam  Constitutional:       General: He is not in acute distress.     Appearance: Normal appearance.   HENT:      Head: Normocephalic and atraumatic.      Nose: No congestion.      Mouth/Throat:      Mouth: Mucous membranes are moist.   Eyes:      Extraocular Movements: Extraocular movements intact.      Pupils: Pupils are equal, round, and reactive to light.   Neck:      Musculoskeletal: Normal range of motion and neck supple. No neck rigidity.   Cardiovascular:      Rate and Rhythm: Normal rate and regular rhythm.      Pulses: Normal pulses.      Heart sounds: Normal heart sounds. No murmur.   Pulmonary:      Effort: No respiratory distress.      Breath sounds: Normal breath sounds. No  wheezing.   Abdominal:      General: Abdomen is flat. Bowel sounds are normal. There is no distension.      Palpations: Abdomen is soft.      Tenderness: There is no abdominal tenderness.   Musculoskeletal:         General: Swelling (2+ pitting edema of LLE to mid-calf) and deformity (L great toe amputation) present. No tenderness.   Skin:     General: Skin is warm and dry.      Findings: Erythema and lesion (L 2nd toe and 3rd toe ulceration at tips) present.   Neurological:      General: No focal deficit present.      Mental Status: He is alert and oriented to person, place, and time.           CRANIAL NERVES     CN III, IV, VI   Pupils are equal, round, and reactive to light.       Significant Labs:   CBC:   Recent Labs   Lab 10/12/20  2137   WBC 10.09   HGB 13.8*   HCT 40.6        CMP:   Recent Labs   Lab 10/12/20  2137   *   K 4.0   CL 97   CO2 22*   *   BUN 27*   CREATININE 1.2   CALCIUM 9.1   PROT 7.4   ALBUMIN 3.5   BILITOT 0.7   ALKPHOS 94   AST 15   ALT 16   ANIONGAP 11   EGFRNONAA >60.0     Lactic Acid:   Recent Labs   Lab 10/12/20  2137   LACTATE 1.1     POCT Glucose: No results for input(s): POCTGLUCOSE in the last 48 hours.  All pertinent labs within the past 24 hours have been reviewed.    Significant Imaging: I have reviewed all pertinent imaging results/findings within the past 24 hours.  X-Ray L foot: soft tissue ulceration with no signs of osteomyelitis    Assessment/Plan:     * Type 2 diabetes mellitus with left diabetic foot ulcer  Patient presents to [facility] ED due to new diabetic foot ulcers on L 2nd and 2rd metatarsal heads. Not associated with pain, no fever, WBC WNL, X-ray shows no evidence of osteomyelitis, but patient has history of recurrent infections including osteomyelitis of the L foot in the past. The L foot is edematous, erythematous and warm to touch with elevated inflammatory markers Sed Rate 51, CRP 21.7. Patient admitted to 1 for further evaluation.    -  Received 1 dose Rocephin in the ED  - Vanc & Cipro started for empiric soft tissue infection coverage  - Podiatry consulted  - Will defer to podiatry if MRI or wound cultures are necessary  - NPO until podiatry evaluates for possible amputation of 2nd metatarsal  - Strict glucose control to promote wound healing    Hyponatremia  Pt with hyponatremia at 130 on admission. This seems to be a chronic hyponatremia with 130 around pt's baseline.    - 134 when corrected for hyperglycemia  - Euvolemic on exam  - Monitor with daily CMP, further w/u if decreasing    PVD (peripheral vascular disease)  - Cont home meds    Uncontrolled type II diabetes mellitus  Pt is an insulin-dependent T2DM with poor glucose control associated with osteomyelitis of the L metatarsal requiring amputation and now presents with recurrent infections and ulcerations of the L foot.     - Last Hb A1c 11.5 on 8/12  - Home meds: Metformin 1000 BID   Glargine 55 nightly, Regular insulin 20 TIDWMM  - Patient says his glucose runs in the 190s at home  - Hold oral anti-glycemics while hospitalized  - While NPO, start 22U Detemir nightly  - Mod dose SSI  - Accu-checks per AC/HS protocol  - Goal Glucose 140-180, titrate insulin regimen as needed  - Diabetic diet    Coronary artery disease involving native coronary artery of native heart without angina pectoris  - Cont home ASA 81 and Atorvastatin 80      Dyslipidemia  - Cont home Atorvastatin 80      Essential hypertension  - Cont. home Coreg 12.5 BID   - Hold Lisinopril 40 for possible surgery in the am        VTE Risk Mitigation (From admission, onward)         Ordered     enoxaparin injection 40 mg  Every 24 hours      10/13/20 0104     IP VTE HIGH RISK PATIENT  Once      10/13/20 0104     Place sequential compression device  Until discontinued      10/13/20 0104                   Sissy Harris MD  Department of Hospital Medicine   [facility]

## 2020-10-13 NOTE — ASSESSMENT & PLAN NOTE
Per primary.    VAS VASILIY's from previous admission. Patient appears to have sufficient flow to the toes for healing.

## 2020-10-14 ENCOUNTER — ANESTHESIA (OUTPATIENT)
Dept: SURGERY | Facility: HOSPITAL | Age: 55
DRG: 617 | End: 2020-10-14
Payer: COMMERCIAL

## 2020-10-14 ENCOUNTER — EXTERNAL HOME HEALTH (OUTPATIENT)
Dept: HOME HEALTH SERVICES | Facility: HOSPITAL | Age: 55
End: 2020-10-14

## 2020-10-14 LAB
ALBUMIN SERPL BCP-MCNC: 2.9 G/DL (ref 3.5–5.2)
ALP SERPL-CCNC: 90 U/L (ref 55–135)
ALT SERPL W/O P-5'-P-CCNC: 14 U/L (ref 10–44)
ANION GAP SERPL CALC-SCNC: 9 MMOL/L (ref 8–16)
AST SERPL-CCNC: 13 U/L (ref 10–40)
BASOPHILS # BLD AUTO: 0.04 K/UL (ref 0–0.2)
BASOPHILS NFR BLD: 0.6 % (ref 0–1.9)
BILIRUB SERPL-MCNC: 0.4 MG/DL (ref 0.1–1)
BUN SERPL-MCNC: 20 MG/DL (ref 6–20)
CALCIUM SERPL-MCNC: 8.7 MG/DL (ref 8.7–10.5)
CHLORIDE SERPL-SCNC: 100 MMOL/L (ref 95–110)
CO2 SERPL-SCNC: 25 MMOL/L (ref 23–29)
CREAT SERPL-MCNC: 1 MG/DL (ref 0.5–1.4)
DIFFERENTIAL METHOD: ABNORMAL
EOSINOPHIL # BLD AUTO: 0.1 K/UL (ref 0–0.5)
EOSINOPHIL NFR BLD: 2.1 % (ref 0–8)
ERYTHROCYTE [DISTWIDTH] IN BLOOD BY AUTOMATED COUNT: 14 % (ref 11.5–14.5)
EST. GFR  (AFRICAN AMERICAN): >60 ML/MIN/1.73 M^2
EST. GFR  (NON AFRICAN AMERICAN): >60 ML/MIN/1.73 M^2
GLUCOSE SERPL-MCNC: 337 MG/DL (ref 70–110)
GRAM STN SPEC: NORMAL
HCT VFR BLD AUTO: 37.5 % (ref 40–54)
HGB BLD-MCNC: 12.8 G/DL (ref 14–18)
IMM GRANULOCYTES # BLD AUTO: 0.02 K/UL (ref 0–0.04)
IMM GRANULOCYTES NFR BLD AUTO: 0.3 % (ref 0–0.5)
LYMPHOCYTES # BLD AUTO: 1.5 K/UL (ref 1–4.8)
LYMPHOCYTES NFR BLD: 25 % (ref 18–48)
MAGNESIUM SERPL-MCNC: 1.6 MG/DL (ref 1.6–2.6)
MCH RBC QN AUTO: 29.2 PG (ref 27–31)
MCHC RBC AUTO-ENTMCNC: 34.1 G/DL (ref 32–36)
MCV RBC AUTO: 85 FL (ref 82–98)
MONOCYTES # BLD AUTO: 0.9 K/UL (ref 0.3–1)
MONOCYTES NFR BLD: 14.1 % (ref 4–15)
NEUTROPHILS # BLD AUTO: 3.6 K/UL (ref 1.8–7.7)
NEUTROPHILS NFR BLD: 57.9 % (ref 38–73)
NRBC BLD-RTO: 0 /100 WBC
PHOSPHATE SERPL-MCNC: 3.8 MG/DL (ref 2.7–4.5)
PLATELET # BLD AUTO: 252 K/UL (ref 150–350)
PMV BLD AUTO: 10.5 FL (ref 9.2–12.9)
POCT GLUCOSE: 235 MG/DL (ref 70–110)
POCT GLUCOSE: 237 MG/DL (ref 70–110)
POCT GLUCOSE: 260 MG/DL (ref 70–110)
POCT GLUCOSE: 362 MG/DL (ref 70–110)
POTASSIUM SERPL-SCNC: 4.3 MMOL/L (ref 3.5–5.1)
PROT SERPL-MCNC: 6.6 G/DL (ref 6–8.4)
RBC # BLD AUTO: 4.39 M/UL (ref 4.6–6.2)
SODIUM SERPL-SCNC: 134 MMOL/L (ref 136–145)
VANCOMYCIN TROUGH SERPL-MCNC: 8.8 UG/ML (ref 10–22)
WBC # BLD AUTO: 6.16 K/UL (ref 3.9–12.7)

## 2020-10-14 PROCEDURE — D9220A PRA ANESTHESIA: ICD-10-PCS | Mod: ,,, | Performed by: ANESTHESIOLOGY

## 2020-10-14 PROCEDURE — 25000003 PHARM REV CODE 250: Performed by: NURSE ANESTHETIST, CERTIFIED REGISTERED

## 2020-10-14 PROCEDURE — 71000015 HC POSTOP RECOV 1ST HR: Performed by: PODIATRIST

## 2020-10-14 PROCEDURE — 71000033 HC RECOVERY, INTIAL HOUR: Performed by: PODIATRIST

## 2020-10-14 PROCEDURE — 99233 SBSQ HOSP IP/OBS HIGH 50: CPT | Mod: ,,, | Performed by: INTERNAL MEDICINE

## 2020-10-14 PROCEDURE — 36415 COLL VENOUS BLD VENIPUNCTURE: CPT

## 2020-10-14 PROCEDURE — 28232 PR INCISION FLEX TOE TENDON: ICD-10-PCS | Mod: 79,51,T2, | Performed by: PODIATRIST

## 2020-10-14 PROCEDURE — 82962 GLUCOSE BLOOD TEST: CPT | Performed by: PODIATRIST

## 2020-10-14 PROCEDURE — 87077 CULTURE AEROBIC IDENTIFY: CPT

## 2020-10-14 PROCEDURE — 28232 INCISION OF TOE TENDON: CPT | Mod: 79,51,T2, | Performed by: PODIATRIST

## 2020-10-14 PROCEDURE — 63600175 PHARM REV CODE 636 W HCPCS: Performed by: NURSE ANESTHETIST, CERTIFIED REGISTERED

## 2020-10-14 PROCEDURE — 25000003 PHARM REV CODE 250: Performed by: STUDENT IN AN ORGANIZED HEALTH CARE EDUCATION/TRAINING PROGRAM

## 2020-10-14 PROCEDURE — 27201423 OPTIME MED/SURG SUP & DEVICES STERILE SUPPLY: Performed by: PODIATRIST

## 2020-10-14 PROCEDURE — 87116 MYCOBACTERIA CULTURE: CPT | Mod: 59

## 2020-10-14 PROCEDURE — 84100 ASSAY OF PHOSPHORUS: CPT

## 2020-10-14 PROCEDURE — 88305 TISSUE EXAM BY PATHOLOGIST: ICD-10-PCS | Mod: 26,,, | Performed by: PATHOLOGY

## 2020-10-14 PROCEDURE — 85025 COMPLETE CBC W/AUTO DIFF WBC: CPT

## 2020-10-14 PROCEDURE — 88311 DECALCIFY TISSUE: CPT | Mod: 59 | Performed by: PATHOLOGY

## 2020-10-14 PROCEDURE — D9220A PRA ANESTHESIA: Mod: ,,, | Performed by: ANESTHESIOLOGY

## 2020-10-14 PROCEDURE — 25000003 PHARM REV CODE 250: Performed by: PODIATRIST

## 2020-10-14 PROCEDURE — 36000706: Performed by: PODIATRIST

## 2020-10-14 PROCEDURE — 83735 ASSAY OF MAGNESIUM: CPT

## 2020-10-14 PROCEDURE — 88311 DECALCIFY TISSUE: CPT | Mod: 26,,, | Performed by: PATHOLOGY

## 2020-10-14 PROCEDURE — 28825 PR AMPUTATION TOE,I-P JT: ICD-10-PCS | Mod: 79,T1,, | Performed by: PODIATRIST

## 2020-10-14 PROCEDURE — 37000008 HC ANESTHESIA 1ST 15 MINUTES: Performed by: PODIATRIST

## 2020-10-14 PROCEDURE — 94761 N-INVAS EAR/PLS OXIMETRY MLT: CPT

## 2020-10-14 PROCEDURE — 88305 TISSUE EXAM BY PATHOLOGIST: CPT | Mod: 26,,, | Performed by: PATHOLOGY

## 2020-10-14 PROCEDURE — 37000009 HC ANESTHESIA EA ADD 15 MINS: Performed by: PODIATRIST

## 2020-10-14 PROCEDURE — 87186 SC STD MICRODIL/AGAR DIL: CPT

## 2020-10-14 PROCEDURE — 80053 COMPREHEN METABOLIC PANEL: CPT

## 2020-10-14 PROCEDURE — 87102 FUNGUS ISOLATION CULTURE: CPT

## 2020-10-14 PROCEDURE — 87070 CULTURE OTHR SPECIMN AEROBIC: CPT

## 2020-10-14 PROCEDURE — 63600175 PHARM REV CODE 636 W HCPCS: Performed by: INTERNAL MEDICINE

## 2020-10-14 PROCEDURE — 28825 PARTIAL AMPUTATION OF TOE: CPT | Mod: 79,T1,, | Performed by: PODIATRIST

## 2020-10-14 PROCEDURE — 88311 PR  DECALCIFY TISSUE: ICD-10-PCS | Mod: 26,,, | Performed by: PATHOLOGY

## 2020-10-14 PROCEDURE — 88305 TISSUE EXAM BY PATHOLOGIST: CPT | Mod: 59 | Performed by: PATHOLOGY

## 2020-10-14 PROCEDURE — 25000003 PHARM REV CODE 250: Performed by: ANESTHESIOLOGY

## 2020-10-14 PROCEDURE — 80202 ASSAY OF VANCOMYCIN: CPT

## 2020-10-14 PROCEDURE — 20220 BONE BIOPSY TROCAR/NDL SUPFC: CPT | Mod: 79,59,, | Performed by: PODIATRIST

## 2020-10-14 PROCEDURE — 99233 PR SUBSEQUENT HOSPITAL CARE,LEVL III: ICD-10-PCS | Mod: ,,, | Performed by: INTERNAL MEDICINE

## 2020-10-14 PROCEDURE — 36000707: Performed by: PODIATRIST

## 2020-10-14 PROCEDURE — 87205 SMEAR GRAM STAIN: CPT | Mod: 59

## 2020-10-14 PROCEDURE — 11000001 HC ACUTE MED/SURG PRIVATE ROOM

## 2020-10-14 PROCEDURE — 87206 SMEAR FLUORESCENT/ACID STAI: CPT

## 2020-10-14 PROCEDURE — 20220 PR BONE BIOPSY,TROCAR/NEEDLE SUPERF: ICD-10-PCS | Mod: 79,59,, | Performed by: PODIATRIST

## 2020-10-14 PROCEDURE — 87075 CULTR BACTERIA EXCEPT BLOOD: CPT | Mod: 59

## 2020-10-14 PROCEDURE — 63600175 PHARM REV CODE 636 W HCPCS: Performed by: STUDENT IN AN ORGANIZED HEALTH CARE EDUCATION/TRAINING PROGRAM

## 2020-10-14 RX ORDER — PROPOFOL 10 MG/ML
VIAL (ML) INTRAVENOUS CONTINUOUS PRN
Status: DISCONTINUED | OUTPATIENT
Start: 2020-10-14 | End: 2020-10-14

## 2020-10-14 RX ORDER — FENTANYL CITRATE 50 UG/ML
50 INJECTION, SOLUTION INTRAMUSCULAR; INTRAVENOUS EVERY 5 MIN PRN
Status: DISCONTINUED | OUTPATIENT
Start: 2020-10-14 | End: 2020-10-14 | Stop reason: HOSPADM

## 2020-10-14 RX ORDER — FENTANYL CITRATE 50 UG/ML
INJECTION, SOLUTION INTRAMUSCULAR; INTRAVENOUS
Status: DISCONTINUED | OUTPATIENT
Start: 2020-10-14 | End: 2020-10-14

## 2020-10-14 RX ORDER — MIDAZOLAM HYDROCHLORIDE 1 MG/ML
INJECTION, SOLUTION INTRAMUSCULAR; INTRAVENOUS
Status: DISCONTINUED | OUTPATIENT
Start: 2020-10-14 | End: 2020-10-14

## 2020-10-14 RX ORDER — SODIUM CHLORIDE 9 MG/ML
INJECTION, SOLUTION INTRAVENOUS CONTINUOUS
Status: DISCONTINUED | OUTPATIENT
Start: 2020-10-14 | End: 2020-10-15 | Stop reason: HOSPADM

## 2020-10-14 RX ORDER — LIDOCAINE HYDROCHLORIDE 20 MG/ML
INJECTION INTRAVENOUS
Status: DISCONTINUED | OUTPATIENT
Start: 2020-10-14 | End: 2020-10-14

## 2020-10-14 RX ORDER — ONDANSETRON 2 MG/ML
INJECTION INTRAMUSCULAR; INTRAVENOUS
Status: DISCONTINUED | OUTPATIENT
Start: 2020-10-14 | End: 2020-10-14

## 2020-10-14 RX ORDER — HYDROMORPHONE HYDROCHLORIDE 1 MG/ML
0.2 INJECTION, SOLUTION INTRAMUSCULAR; INTRAVENOUS; SUBCUTANEOUS EVERY 5 MIN PRN
Status: DISCONTINUED | OUTPATIENT
Start: 2020-10-14 | End: 2020-10-14 | Stop reason: HOSPADM

## 2020-10-14 RX ORDER — IBUPROFEN 200 MG
400 TABLET ORAL DAILY PRN
Status: ON HOLD | COMMUNITY
End: 2022-11-28 | Stop reason: HOSPADM

## 2020-10-14 RX ORDER — SODIUM CHLORIDE 9 MG/ML
INJECTION, SOLUTION INTRAVENOUS CONTINUOUS PRN
Status: DISCONTINUED | OUTPATIENT
Start: 2020-10-14 | End: 2020-10-14

## 2020-10-14 RX ORDER — BUPIVACAINE HYDROCHLORIDE 2.5 MG/ML
INJECTION, SOLUTION EPIDURAL; INFILTRATION; INTRACAUDAL
Status: DISCONTINUED | OUTPATIENT
Start: 2020-10-14 | End: 2020-10-14 | Stop reason: HOSPADM

## 2020-10-14 RX ORDER — ACETAMINOPHEN 500 MG
1000 TABLET ORAL DAILY PRN
COMMUNITY

## 2020-10-14 RX ORDER — PROPOFOL 10 MG/ML
VIAL (ML) INTRAVENOUS
Status: DISCONTINUED | OUTPATIENT
Start: 2020-10-14 | End: 2020-10-14

## 2020-10-14 RX ORDER — SODIUM CHLORIDE 0.9 % (FLUSH) 0.9 %
3 SYRINGE (ML) INJECTION
Status: DISCONTINUED | OUTPATIENT
Start: 2020-10-14 | End: 2020-10-14 | Stop reason: HOSPADM

## 2020-10-14 RX ADMIN — SODIUM CHLORIDE: 0.9 INJECTION, SOLUTION INTRAVENOUS at 02:10

## 2020-10-14 RX ADMIN — VANCOMYCIN HYDROCHLORIDE 1750 MG: 750 INJECTION, POWDER, LYOPHILIZED, FOR SOLUTION INTRAVENOUS at 04:10

## 2020-10-14 RX ADMIN — FENTANYL CITRATE 50 MCG: 50 INJECTION, SOLUTION INTRAMUSCULAR; INTRAVENOUS at 02:10

## 2020-10-14 RX ADMIN — INSULIN ASPART 5 UNITS: 100 INJECTION, SOLUTION INTRAVENOUS; SUBCUTANEOUS at 08:10

## 2020-10-14 RX ADMIN — LIDOCAINE HYDROCHLORIDE 80 MG: 20 INJECTION, SOLUTION INTRAVENOUS at 02:10

## 2020-10-14 RX ADMIN — INSULIN ASPART 4 UNITS: 100 INJECTION, SOLUTION INTRAVENOUS; SUBCUTANEOUS at 04:10

## 2020-10-14 RX ADMIN — CIPROFLOXACIN 750 MG: 750 TABLET, FILM COATED ORAL at 08:10

## 2020-10-14 RX ADMIN — ATORVASTATIN CALCIUM 80 MG: 20 TABLET, FILM COATED ORAL at 08:10

## 2020-10-14 RX ADMIN — SODIUM CHLORIDE: 0.9 INJECTION, SOLUTION INTRAVENOUS at 08:10

## 2020-10-14 RX ADMIN — CARVEDILOL 12.5 MG: 12.5 TABLET, FILM COATED ORAL at 08:10

## 2020-10-14 RX ADMIN — VANCOMYCIN HYDROCHLORIDE 1750 MG: 750 INJECTION, POWDER, LYOPHILIZED, FOR SOLUTION INTRAVENOUS at 03:10

## 2020-10-14 RX ADMIN — PROPOFOL 60 MG: 10 INJECTION, EMULSION INTRAVENOUS at 02:10

## 2020-10-14 RX ADMIN — PROPOFOL 50 MCG/KG/MIN: 10 INJECTION, EMULSION INTRAVENOUS at 02:10

## 2020-10-14 RX ADMIN — FENTANYL CITRATE 25 MCG: 50 INJECTION, SOLUTION INTRAMUSCULAR; INTRAVENOUS at 02:10

## 2020-10-14 RX ADMIN — MIDAZOLAM HYDROCHLORIDE 2 MG: 1 INJECTION, SOLUTION INTRAMUSCULAR; INTRAVENOUS at 02:10

## 2020-10-14 RX ADMIN — ENOXAPARIN SODIUM 40 MG: 40 INJECTION SUBCUTANEOUS at 04:10

## 2020-10-14 RX ADMIN — ASPIRIN 81 MG: 81 TABLET, COATED ORAL at 08:10

## 2020-10-14 RX ADMIN — ONDANSETRON 4 MG: 2 INJECTION, SOLUTION INTRAMUSCULAR; INTRAVENOUS at 02:10

## 2020-10-14 NOTE — PROGRESS NOTES
[facility] Medicine  Progress Note    Patient Name: Billy Rivera  MRN: [id number]  Patient Class: IP- Inpatient   Admission Date: 10/12/2020  Length of Stay: 2 days  Attending Physician: Dominic Mcbride MD  Primary Care Provider: BRAD Baker MD        Subjective:     Principal Problem:Diabetic foot infection        HPI:  Billy Rivera is a 56 yo M with PMHx of insulin dependent T2DM, osteomyelitis of L 1st metatarsal s/p amputation on 7/4/20, PVA, CAD, HLD, and HTN who presented to [facility] ED due to new diabetic foot ulceration of L 2nd and 3rd toes. He says he was walking around today wearing a large and wide shoe - when he took his shoe and bandage off, he noticed a new ulceration and the 2nd toenail fell off. He has no pain sensation of the foot, but it is erythematous, edematous, and warm to touch. He has a remote history of poor wound healing after amputation of L great toe, multiple infections requiring antibiotics in the past few months due to polymicrobial species. Latest abx treatment with Ceftriaxone x10 days for GBS SSTI and PO Flagyl for Finegoldia osteomyelitis (End date 10/9/2020) He follows with podiatry outpatient. Pt denies fever, chills, fatigue, CP, abd pain, nausea/vomiting, diarrhea/constipation, urinary symptoms, or any other symptom at this time.     ED Course: Vitals: afebrile 97.9F, HR 78, 127/80, 98% on RA.  WBC 10.09, Lactate neg, increased inflammatory markers - CRP 21.7, Sed Rate 51  X-ray L foot: soft tissue ulcers with no evidence of osteomyelitis  Ceftriaxone given in ED. Admitted to hospital medicine for further management.    Overview/Hospital Course:  No notes on file    Interval History: naeon    Review of Systems   Constitutional: Negative for chills, fatigue and fever.   HENT: Negative for congestion, rhinorrhea and sore throat.    Eyes: Negative.    Respiratory: Negative for cough, shortness of breath and wheezing.    Cardiovascular:  Negative for chest pain and leg swelling.   Gastrointestinal: Negative for abdominal pain, constipation, diarrhea, nausea and vomiting.   Genitourinary: Negative for dysuria, frequency and urgency.   Musculoskeletal: Negative for arthralgias and myalgias.   Skin: Positive for color change and wound. Negative for rash.        L 2nd and 3rd toe ulceration with associated erythema to mid-calf and soft-tissue edema   Neurological: Negative for weakness and headaches.   Psychiatric/Behavioral: Negative for agitation and confusion.     Objective:     Vital Signs (Most Recent):  Temp: 97.6 °F (36.4 °C) (10/14/20 1300)  Pulse: (!) 54 (10/14/20 1300)  Resp: 20 (10/14/20 1300)  BP: (!) 146/85 (10/14/20 1300)  SpO2: 100 % (10/14/20 1300) Vital Signs (24h Range):  Temp:  [97.2 °F (36.2 °C)-98.8 °F (37.1 °C)] 97.6 °F (36.4 °C)  Pulse:  [53-66] 54  Resp:  [16-20] 20  SpO2:  [93 %-100 %] 100 %  BP: (121-146)/(62-85) 146/85     Weight: 117.9 kg (259 lb 14.8 oz)  Body mass index is 33.37 kg/m².    Intake/Output Summary (Last 24 hours) at 10/14/2020 1414  Last data filed at 10/13/2020 1800  Gross per 24 hour   Intake 480 ml   Output --   Net 480 ml      Physical Exam  Constitutional:       General: He is not in acute distress.     Appearance: Normal appearance.   HENT:      Head: Normocephalic and atraumatic.      Nose: No congestion.      Mouth/Throat:      Mouth: Mucous membranes are moist.   Eyes:      Extraocular Movements: Extraocular movements intact.      Pupils: Pupils are equal, round, and reactive to light.   Neck:      Musculoskeletal: Normal range of motion and neck supple. No neck rigidity.   Cardiovascular:      Rate and Rhythm: Normal rate and regular rhythm.      Pulses: Normal pulses.      Heart sounds: Normal heart sounds. No murmur.   Pulmonary:      Effort: No respiratory distress.      Breath sounds: Normal breath sounds. No wheezing.   Abdominal:      General: Abdomen is flat. Bowel sounds are normal. There is  no distension.      Palpations: Abdomen is soft.      Tenderness: There is no abdominal tenderness.   Musculoskeletal:         General: Swelling (2+ pitting edema of LLE to mid-calf) and deformity (L great toe amputation) present. No tenderness.   Skin:     General: Skin is warm and dry.      Findings: Erythema and lesion (L 2nd toe and 3rd toe ulceration at tips) present.   Neurological:      General: No focal deficit present.      Mental Status: He is alert and oriented to person, place, and time.         Significant Labs:   CBC:   Recent Labs   Lab 10/12/20  2137 10/13/20  0403 10/14/20  0327   WBC 10.09 6.65 6.16   HGB 13.8* 12.3* 12.8*   HCT 40.6 37.2* 37.5*    214 252     CMP:   Recent Labs   Lab 10/12/20  2137 10/13/20  0402 10/14/20  0327   * 129* 134*   K 4.0 3.9 4.3   CL 97 97 100   CO2 22* 23 25   * 436* 337*   BUN 27* 24* 20   CREATININE 1.2 1.0 1.0   CALCIUM 9.1 8.4* 8.7   PROT 7.4 6.6 6.6   ALBUMIN 3.5 3.0* 2.9*   BILITOT 0.7 0.4 0.4   ALKPHOS 94 83 90   AST 15 15 13   ALT 16 15 14   ANIONGAP 11 9 9   EGFRNONAA >60.0 >60.0 >60.0       Significant Imaging: I have reviewed and interpreted all pertinent imaging results/findings within the past 24 hours.      Assessment/Plan:      Type 2 diabetes mellitus with left diabetic foot ulcer  Patient presents to [facility] ED due to new diabetic foot ulcers on L 2nd and 2rd metatarsal heads. Not associated with pain, no fever, WBC WNL, X-ray shows no evidence of osteomyelitis, but patient has history of recurrent infections including osteomyelitis of the L foot in the past. The L foot is edematous, erythematous and warm to touch with elevated inflammatory markers Sed Rate 51, CRP 21.7. Patient admitted to 1 for further evaluation.    - Vanc & Cipro started for empiric soft tissue infection coverage  - Podiatry consulted - to OR today  - Strict glucose control to promote wound healing    Hyponatremia  Pt with hyponatremia at 130 on admission. This  seems to be a chronic hyponatremia with 130 around pt's baseline.    - 134 when corrected for hyperglycemia  - Euvolemic on exam  - Monitor with daily CMP, further w/u if decreasing    PVD (peripheral vascular disease)  - Cont home meds    Uncontrolled type II diabetes mellitus  Pt is an insulin-dependent T2DM with poor glucose control associated with osteomyelitis of the L metatarsal requiring amputation and now presents with recurrent infections and ulcerations of the L foot.     - Last Hb A1c 11.5 on 8/12  - Home meds: Metformin 1000 BID   Glargine 55 nightly, Regular insulin 20 TIDWMM  - Patient says his glucose runs in the 190s at home  - Hold oral anti-glycemics while hospitalized  - While NPO, start 22U Detemir nightly - increase 40 qhs, further uptitration as needed  - Mod dose SSI  - Accu-checks per AC/HS protocol  - Goal Glucose 140-180, titrate insulin regimen as needed  - Diabetic diet    Coronary artery disease involving native coronary artery of native heart without angina pectoris  - Cont home ASA 81 and Atorvastatin 80      Dyslipidemia  - Cont home Atorvastatin 80      Essential hypertension  - Cont. home Coreg 12.5 BID   - Hold Lisinopril 40 for possible surgery in the am        VTE Risk Mitigation (From admission, onward)         Ordered     enoxaparin injection 40 mg  Every 24 hours      10/13/20 0104     IP VTE HIGH RISK PATIENT  Once      10/13/20 0104     Place sequential compression device  Until discontinued      10/13/20 0104                Discharge Planning   DARVIN: 10/16/2020     Code Status: Full Code   Is the patient medically ready for discharge?: No    Reason for patient still in hospital (select all that apply): Treatment  Discharge Plan A: Home with family                  Josiah Magallanes MD  Department of Hospital Medicine   [facility]

## 2020-10-14 NOTE — TRANSFER OF CARE
"Anesthesia Transfer of Care Note    Patient: Billy Rivera    Procedure(s) Performed: Procedure(s) (LRB):  AMPUTATION, TOE (Left)    Patient location: PACU    Anesthesia Type: general    Transport from OR: Transported from OR on 6-10 L/min O2 by face mask with adequate spontaneous ventilation    Post pain: adequate analgesia    Post assessment: no apparent anesthetic complications and tolerated procedure well    Post vital signs: stable    Level of consciousness: sedated    Nausea/Vomiting: no nausea/vomiting    Complications: none    Transfer of care protocol was followed      Last vitals:   Visit Vitals  BP (!) 146/85 (BP Location: Left arm, Patient Position: Lying)   Pulse (!) 54   Temp 36.4 °C (97.6 °F) (Oral)   Resp 20   Ht 6' 2" (1.88 m)   Wt 117.9 kg (259 lb 14.8 oz)   SpO2 100%   BMI 33.37 kg/m²     "

## 2020-10-14 NOTE — PLAN OF CARE
"  Problem: Fall Injury Risk  Goal: Absence of Fall and Fall-Related Injury  Outcome: Ongoing, Progressing     Problem: Adult Inpatient Plan of Care  Goal: Plan of Care Review  Outcome: Ongoing, Progressing  Goal: Optimal Comfort and Wellbeing  Outcome: Ongoing, Progressing     Problem: Diabetes Comorbidity  Goal: Blood Glucose Level Within Desired Range  Outcome: Ongoing, Progressing     Problem: Wound  Goal: Optimal Wound Healing  Outcome: Ongoing, Progressing   Patient admitted to unit from ER after a Podiatry appt where is was found to have an infection to left first toe. Is currently on IV abt's and remains afebrile. Dressing to left foot os DCI. Is having surgery this morning for possible amputation of that toe. He is a long standing Type 2 DM patient and has had elevated HGBA1C's for the past 6 months. When nurse speaks with him regarding the need to watch what he eats, following a diabetic diet he starts to "talk over" nurse stating "I already known that but I don't follow that crap" Call light in reach, refusing bed alarm as he walks independently to bathroom. Flow sheets completed.   "

## 2020-10-14 NOTE — OP NOTE
Operative Note       Surgery Date: 10/14/2020     Surgeon(s) and Role:     * Mingo Melara, DAE - Primary  Kenneth Perez DPM, PGY-3      Pre-op Diagnosis:  Type 2 diabetes mellitus with left diabetic foot ulcer [E11.621, L97.529]  Left leg cellulitis [L03.116]  Acute hematogenous osteomyelitis of left foot [M86.072]    Post-op Diagnosis: Post-Op Diagnosis Codes:     * Type 2 diabetes mellitus with left diabetic foot ulcer [E11.621, L97.529]     * Left leg cellulitis [L03.116]     * Acute hematogenous osteomyelitis of left foot [M86.072]    Procedure(s) (LRB):  Partial amputation of the left 2nd toe.  Flexor tenotomy of the 3rd and 5th toes L foot.  Bone biopsy of the 2nd metatarsal left foot.    Anesthesia: Local MAC    Procedure in Detail/Findings:  The patient was brought to the operating room on a stretcher and kept on the stretcher. in a  supine position. Following the successful induction of MAC: tourniquet was applied to the left ankle and local block was given with 20 ml of 1% lidocaine and 0.5% marcaine plain. The left lower extremity was prepped and draped in a sterile manner.     A timeout was performed confirming the patient's identification, procedure, surgical site, medications and allergies. All were in agreement.    The tourniquet was inflated to 250mmHg.    A racquet shaped incision was used to excise the wound on the tuft of the left 2nd toe. The incision was deepened to bone and the skin was excised from the underlying phalanx. The phalanx was then disarticulated and sent for culture and pathology. The wound was irrigated and a clean bone cutter was used to extract a clean margin from the head of the middle phalanx for culture and pathology. The wound was then closed with 3-0 nylon.     The decision was made to perform flexor tenotomies to the 5th and 3rd toes to prevent ulcerations due to semi rigid hammering. There is good reduction following the procedure. The incisions were closed using 3-0  nylon.    A lilo shidi needle was then used to extract a small portion of bone from the base of the 2nd metatarsal for culture and pathology due to MRI findings.    Tourniquet was released and the incisions were dressed with xeroform, 4x4s, kerlix and ACE.    Low likelihood for need of long term IV antibiotics. Would cover for 7-10 days for SSTI and can adjust antibiotics per culture results as an OP.     The patient was transferred to the recovery room with vital signs stable. Following a period of post op monitoring, the patient will be returned to floor with the following post op instructions:   1. Keep dressing dry and intact until Podiatry follow up.   2.Weight bearing status: heel touch weight bearing for short transfers < 15 feet  3. All necessary prescriptions ordered and medical management will continue.   4. Contact Podiatry for all post op follow up care and if any problems arise.        Estimated Blood Loss: <5mL           Specimens (From admission, onward)     Start     Ordered    10/14/20 1444  Specimen to Pathology, Surgery Other  Once     Question Answer Comment   Procedure Type: Other    Specimen Class: Routine/Screening        10/14/20 1445              Implants: * No implants in log *           Disposition: PACU - hemodynamically stable.           Condition: Good    Attestation:  I was present and scrubbed for the entire procedure.

## 2020-10-14 NOTE — PLAN OF CARE
"Pre op nursing care complete. Surgery delayed until 1430. Patient reported drinking "a sip or maybe an once of milk with medicine this morning" at 0900. Anesthesia notified.   "

## 2020-10-14 NOTE — NURSING
Patient up and ambulating to the bathroom, reeducated on the fact that he is NPO after midnight. Has no food/liquids at BS. Refused his bath for his sx upcoming this morning. IV abt's continue for toe infection and is afebrile.

## 2020-10-14 NOTE — ANESTHESIA POSTPROCEDURE EVALUATION
Anesthesia Post Evaluation    Patient: Billy Rivera    Procedure(s) Performed: Procedure(s) (LRB):  AMPUTATION, TOE (Left)    Final Anesthesia Type: general    Patient location during evaluation: PACU  Patient participation: Yes- Able to Participate  Level of consciousness: awake and alert  Post-procedure vital signs: reviewed and stable  Pain management: adequate  Airway patency: patent    PONV status at discharge: No PONV  Anesthetic complications: no      Cardiovascular status: stable  Respiratory status: unassisted and spontaneous ventilation  Hydration status: euvolemic  Follow-up not needed.          Vitals Value Taken Time   /91 10/14/20 1550   Temp 36.3 °C (97.3 °F) 10/14/20 1503   Pulse 47 10/14/20 1556   Resp 16 10/14/20 1545   SpO2 100 % 10/14/20 1556   Vitals shown include unvalidated device data.      Event Time   Out of Recovery 10/14/2020 15:15:00         Pain/Gabriel Score: Gabriel Score: 10 (10/14/2020  3:45 PM)

## 2020-10-14 NOTE — SUBJECTIVE & OBJECTIVE
Interval History: naeon    Review of Systems   Constitutional: Negative for chills, fatigue and fever.   HENT: Negative for congestion, rhinorrhea and sore throat.    Eyes: Negative.    Respiratory: Negative for cough, shortness of breath and wheezing.    Cardiovascular: Negative for chest pain and leg swelling.   Gastrointestinal: Negative for abdominal pain, constipation, diarrhea, nausea and vomiting.   Genitourinary: Negative for dysuria, frequency and urgency.   Musculoskeletal: Negative for arthralgias and myalgias.   Skin: Positive for color change and wound. Negative for rash.        L 2nd and 3rd toe ulceration with associated erythema to mid-calf and soft-tissue edema   Neurological: Negative for weakness and headaches.   Psychiatric/Behavioral: Negative for agitation and confusion.     Objective:     Vital Signs (Most Recent):  Temp: 97.6 °F (36.4 °C) (10/14/20 1300)  Pulse: (!) 54 (10/14/20 1300)  Resp: 20 (10/14/20 1300)  BP: (!) 146/85 (10/14/20 1300)  SpO2: 100 % (10/14/20 1300) Vital Signs (24h Range):  Temp:  [97.2 °F (36.2 °C)-98.8 °F (37.1 °C)] 97.6 °F (36.4 °C)  Pulse:  [53-66] 54  Resp:  [16-20] 20  SpO2:  [93 %-100 %] 100 %  BP: (121-146)/(62-85) 146/85     Weight: 117.9 kg (259 lb 14.8 oz)  Body mass index is 33.37 kg/m².    Intake/Output Summary (Last 24 hours) at 10/14/2020 1414  Last data filed at 10/13/2020 1800  Gross per 24 hour   Intake 480 ml   Output --   Net 480 ml      Physical Exam  Constitutional:       General: He is not in acute distress.     Appearance: Normal appearance.   HENT:      Head: Normocephalic and atraumatic.      Nose: No congestion.      Mouth/Throat:      Mouth: Mucous membranes are moist.   Eyes:      Extraocular Movements: Extraocular movements intact.      Pupils: Pupils are equal, round, and reactive to light.   Neck:      Musculoskeletal: Normal range of motion and neck supple. No neck rigidity.   Cardiovascular:      Rate and Rhythm: Normal rate and regular  rhythm.      Pulses: Normal pulses.      Heart sounds: Normal heart sounds. No murmur.   Pulmonary:      Effort: No respiratory distress.      Breath sounds: Normal breath sounds. No wheezing.   Abdominal:      General: Abdomen is flat. Bowel sounds are normal. There is no distension.      Palpations: Abdomen is soft.      Tenderness: There is no abdominal tenderness.   Musculoskeletal:         General: Swelling (2+ pitting edema of LLE to mid-calf) and deformity (L great toe amputation) present. No tenderness.   Skin:     General: Skin is warm and dry.      Findings: Erythema and lesion (L 2nd toe and 3rd toe ulceration at tips) present.   Neurological:      General: No focal deficit present.      Mental Status: He is alert and oriented to person, place, and time.         Significant Labs:   CBC:   Recent Labs   Lab 10/12/20  2137 10/13/20  0403 10/14/20  0327   WBC 10.09 6.65 6.16   HGB 13.8* 12.3* 12.8*   HCT 40.6 37.2* 37.5*    214 252     CMP:   Recent Labs   Lab 10/12/20  2137 10/13/20  0402 10/14/20  0327   * 129* 134*   K 4.0 3.9 4.3   CL 97 97 100   CO2 22* 23 25   * 436* 337*   BUN 27* 24* 20   CREATININE 1.2 1.0 1.0   CALCIUM 9.1 8.4* 8.7   PROT 7.4 6.6 6.6   ALBUMIN 3.5 3.0* 2.9*   BILITOT 0.7 0.4 0.4   ALKPHOS 94 83 90   AST 15 15 13   ALT 16 15 14   ANIONGAP 11 9 9   EGFRNONAA >60.0 >60.0 >60.0       Significant Imaging: I have reviewed and interpreted all pertinent imaging results/findings within the past 24 hours.

## 2020-10-14 NOTE — PT/OT/SLP PROGRESS
Occupational Therapy      Patient Name:  Billy Sheffield Miguel   MRN:  [id number]    Patient not seen today secondary to : MANSOOR for OR.   OT to sign off at this time.    RENÉ Olguin  10/14/2020

## 2020-10-14 NOTE — PT/OT/SLP PROGRESS
Physical Therapy      Patient Name:  Billy Sheffield Miguel   MRN:  [id number]    Patient not seen today secondary to pt going to sx. PT will await further orders post op.    Ami Olvera, PT 10/14/20

## 2020-10-15 VITALS
OXYGEN SATURATION: 99 % | SYSTOLIC BLOOD PRESSURE: 140 MMHG | BODY MASS INDEX: 33.36 KG/M2 | RESPIRATION RATE: 16 BRPM | HEART RATE: 63 BPM | DIASTOLIC BLOOD PRESSURE: 93 MMHG | WEIGHT: 259.94 LBS | TEMPERATURE: 99 F | HEIGHT: 74 IN

## 2020-10-15 PROBLEM — L97.529 TYPE 2 DIABETES MELLITUS WITH LEFT DIABETIC FOOT ULCER: Status: RESOLVED | Noted: 2020-10-12 | Resolved: 2020-10-15

## 2020-10-15 PROBLEM — E11.621 TYPE 2 DIABETES MELLITUS WITH LEFT DIABETIC FOOT ULCER: Status: RESOLVED | Noted: 2020-10-12 | Resolved: 2020-10-15

## 2020-10-15 LAB
ALBUMIN SERPL BCP-MCNC: 3 G/DL (ref 3.5–5.2)
ALP SERPL-CCNC: 94 U/L (ref 55–135)
ALT SERPL W/O P-5'-P-CCNC: 16 U/L (ref 10–44)
ANION GAP SERPL CALC-SCNC: 6 MMOL/L (ref 8–16)
AST SERPL-CCNC: 16 U/L (ref 10–40)
BASOPHILS # BLD AUTO: 0.04 K/UL (ref 0–0.2)
BASOPHILS NFR BLD: 0.6 % (ref 0–1.9)
BILIRUB SERPL-MCNC: 0.3 MG/DL (ref 0.1–1)
BUN SERPL-MCNC: 19 MG/DL (ref 6–20)
CALCIUM SERPL-MCNC: 8.7 MG/DL (ref 8.7–10.5)
CHLORIDE SERPL-SCNC: 100 MMOL/L (ref 95–110)
CO2 SERPL-SCNC: 26 MMOL/L (ref 23–29)
CREAT SERPL-MCNC: 0.9 MG/DL (ref 0.5–1.4)
DIFFERENTIAL METHOD: ABNORMAL
EOSINOPHIL # BLD AUTO: 0.1 K/UL (ref 0–0.5)
EOSINOPHIL NFR BLD: 2.1 % (ref 0–8)
ERYTHROCYTE [DISTWIDTH] IN BLOOD BY AUTOMATED COUNT: 13.8 % (ref 11.5–14.5)
EST. GFR  (AFRICAN AMERICAN): >60 ML/MIN/1.73 M^2
EST. GFR  (NON AFRICAN AMERICAN): >60 ML/MIN/1.73 M^2
GLUCOSE SERPL-MCNC: 308 MG/DL (ref 70–110)
HCT VFR BLD AUTO: 38 % (ref 40–54)
HGB BLD-MCNC: 12.6 G/DL (ref 14–18)
IMM GRANULOCYTES # BLD AUTO: 0.06 K/UL (ref 0–0.04)
IMM GRANULOCYTES NFR BLD AUTO: 1 % (ref 0–0.5)
LYMPHOCYTES # BLD AUTO: 1.3 K/UL (ref 1–4.8)
LYMPHOCYTES NFR BLD: 20.8 % (ref 18–48)
MAGNESIUM SERPL-MCNC: 1.5 MG/DL (ref 1.6–2.6)
MCH RBC QN AUTO: 28.8 PG (ref 27–31)
MCHC RBC AUTO-ENTMCNC: 33.2 G/DL (ref 32–36)
MCV RBC AUTO: 87 FL (ref 82–98)
MONOCYTES # BLD AUTO: 0.7 K/UL (ref 0.3–1)
MONOCYTES NFR BLD: 11.8 % (ref 4–15)
NEUTROPHILS # BLD AUTO: 4 K/UL (ref 1.8–7.7)
NEUTROPHILS NFR BLD: 63.7 % (ref 38–73)
NRBC BLD-RTO: 0 /100 WBC
PHOSPHATE SERPL-MCNC: 3.7 MG/DL (ref 2.7–4.5)
PLATELET # BLD AUTO: 251 K/UL (ref 150–350)
PMV BLD AUTO: 10.1 FL (ref 9.2–12.9)
POCT GLUCOSE: 266 MG/DL (ref 70–110)
POCT GLUCOSE: 267 MG/DL (ref 70–110)
POCT GLUCOSE: 302 MG/DL (ref 70–110)
POTASSIUM SERPL-SCNC: 4.4 MMOL/L (ref 3.5–5.1)
PROT SERPL-MCNC: 6.6 G/DL (ref 6–8.4)
RBC # BLD AUTO: 4.37 M/UL (ref 4.6–6.2)
SODIUM SERPL-SCNC: 132 MMOL/L (ref 136–145)
VANCOMYCIN TROUGH SERPL-MCNC: 15 UG/ML (ref 10–22)
WBC # BLD AUTO: 6.25 K/UL (ref 3.9–12.7)

## 2020-10-15 PROCEDURE — 99024 POSTOP FOLLOW-UP VISIT: CPT | Mod: ,,, | Performed by: PODIATRIST

## 2020-10-15 PROCEDURE — 36415 COLL VENOUS BLD VENIPUNCTURE: CPT

## 2020-10-15 PROCEDURE — 80053 COMPREHEN METABOLIC PANEL: CPT

## 2020-10-15 PROCEDURE — 85025 COMPLETE CBC W/AUTO DIFF WBC: CPT

## 2020-10-15 PROCEDURE — 84100 ASSAY OF PHOSPHORUS: CPT

## 2020-10-15 PROCEDURE — 25000003 PHARM REV CODE 250: Performed by: STUDENT IN AN ORGANIZED HEALTH CARE EDUCATION/TRAINING PROGRAM

## 2020-10-15 PROCEDURE — 80202 ASSAY OF VANCOMYCIN: CPT

## 2020-10-15 PROCEDURE — 63600175 PHARM REV CODE 636 W HCPCS: Performed by: STUDENT IN AN ORGANIZED HEALTH CARE EDUCATION/TRAINING PROGRAM

## 2020-10-15 PROCEDURE — 83735 ASSAY OF MAGNESIUM: CPT

## 2020-10-15 PROCEDURE — 97161 PT EVAL LOW COMPLEX 20 MIN: CPT

## 2020-10-15 PROCEDURE — 99238 HOSP IP/OBS DSCHRG MGMT 30/<: CPT | Mod: ,,, | Performed by: INTERNAL MEDICINE

## 2020-10-15 PROCEDURE — 63600175 PHARM REV CODE 636 W HCPCS: Performed by: INTERNAL MEDICINE

## 2020-10-15 PROCEDURE — 99238 PR HOSPITAL DISCHARGE DAY,<30 MIN: ICD-10-PCS | Mod: ,,, | Performed by: INTERNAL MEDICINE

## 2020-10-15 PROCEDURE — 97165 OT EVAL LOW COMPLEX 30 MIN: CPT

## 2020-10-15 PROCEDURE — 99024 PR POST-OP FOLLOW-UP VISIT: ICD-10-PCS | Mod: ,,, | Performed by: PODIATRIST

## 2020-10-15 RX ORDER — DOXYCYCLINE 100 MG/1
100 CAPSULE ORAL EVERY 12 HOURS
Qty: 20 CAPSULE | Refills: 0 | Status: SHIPPED | OUTPATIENT
Start: 2020-10-15 | End: 2020-10-25

## 2020-10-15 RX ORDER — MAGNESIUM SULFATE HEPTAHYDRATE 40 MG/ML
2 INJECTION, SOLUTION INTRAVENOUS ONCE
Status: COMPLETED | OUTPATIENT
Start: 2020-10-15 | End: 2020-10-15

## 2020-10-15 RX ORDER — METFORMIN HYDROCHLORIDE 1000 MG/1
1000 TABLET ORAL 2 TIMES DAILY WITH MEALS
Qty: 60 TABLET | Refills: 5 | Status: SHIPPED | OUTPATIENT
Start: 2020-10-15 | End: 2020-11-25

## 2020-10-15 RX ORDER — INSULIN GLARGINE 100 [IU]/ML
55 INJECTION, SOLUTION SUBCUTANEOUS NIGHTLY
Qty: 16.5 ML | Refills: 11 | Status: SHIPPED | OUTPATIENT
Start: 2020-10-15 | End: 2020-12-18

## 2020-10-15 RX ORDER — DOXYCYCLINE 100 MG/1
100 CAPSULE ORAL EVERY 12 HOURS
Qty: 20 CAPSULE | Refills: 0 | Status: ON HOLD | OUTPATIENT
Start: 2020-10-15 | End: 2020-12-24 | Stop reason: HOSPADM

## 2020-10-15 RX ORDER — ATORVASTATIN CALCIUM 80 MG/1
80 TABLET, FILM COATED ORAL DAILY
Qty: 90 TABLET | Refills: 3 | Status: SHIPPED | OUTPATIENT
Start: 2020-10-15 | End: 2020-12-18 | Stop reason: SDUPTHER

## 2020-10-15 RX ORDER — CARVEDILOL 12.5 MG/1
12.5 TABLET ORAL 2 TIMES DAILY
Qty: 180 TABLET | Refills: 3 | Status: SHIPPED | OUTPATIENT
Start: 2020-10-15 | End: 2020-12-18 | Stop reason: SDUPTHER

## 2020-10-15 RX ORDER — LISINOPRIL 40 MG/1
40 TABLET ORAL DAILY
Qty: 90 TABLET | Refills: 3 | Status: SHIPPED | OUTPATIENT
Start: 2020-10-15 | End: 2020-12-18 | Stop reason: SDUPTHER

## 2020-10-15 RX ADMIN — INSULIN ASPART 4 UNITS: 100 INJECTION, SOLUTION INTRAVENOUS; SUBCUTANEOUS at 09:10

## 2020-10-15 RX ADMIN — ATORVASTATIN CALCIUM 80 MG: 20 TABLET, FILM COATED ORAL at 09:10

## 2020-10-15 RX ADMIN — CIPROFLOXACIN 750 MG: 750 TABLET, FILM COATED ORAL at 09:10

## 2020-10-15 RX ADMIN — VANCOMYCIN HYDROCHLORIDE 1750 MG: 750 INJECTION, POWDER, LYOPHILIZED, FOR SOLUTION INTRAVENOUS at 06:10

## 2020-10-15 RX ADMIN — MAGNESIUM SULFATE 2 G: 2 INJECTION INTRAVENOUS at 09:10

## 2020-10-15 RX ADMIN — INSULIN ASPART 8 UNITS: 100 INJECTION, SOLUTION INTRAVENOUS; SUBCUTANEOUS at 02:10

## 2020-10-15 RX ADMIN — CARVEDILOL 12.5 MG: 12.5 TABLET, FILM COATED ORAL at 09:10

## 2020-10-15 RX ADMIN — ASPIRIN 81 MG: 81 TABLET, COATED ORAL at 09:10

## 2020-10-15 NOTE — HOSPITAL COURSE
Patient seen by podiatry and wanted to avoid prolonged antibiotics. Was taken to the operating room the following day for surgical debridement and amputation. Cultures were collected and sent. Patient able to ambulate and function at previous level without any PT/OT needs. Podiatry recommended doxycycline for outpatient treatment.

## 2020-10-15 NOTE — ASSESSMENT & PLAN NOTE
Plan:  - Doxycycline 100 mg BID for 10 days  - Keep foot dry and elevated  - Follow up with podiatry in 1 week for wound check.

## 2020-10-15 NOTE — PLAN OF CARE
[facility]    HOME HEALTH ORDERS  FACE TO FACE ENCOUNTER    Patient Name: Billy Rivera  YOB: 1965    PCP: BRAD Baker MD   PCP Address: [address] / [place] [address]  PCP Phone Number: [phone]  PCP Fax: [phone]    Encounter Date: 10/15/2020    Admit to Home Health    Diagnoses:  Active Hospital Problems    Diagnosis  POA    *Diabetic foot infection [E11.628, L08.9]  Yes    Hypomagnesemia [E83.42]  Yes    Hyponatremia [E87.1]  Yes    Type 2 diabetes mellitus with diabetic peripheral angiopathy without gangrene, with long-term current use of insulin [E11.51, Z79.4]  Not Applicable    PVD (peripheral vascular disease) [I73.9]  Yes    Coronary artery disease involving native coronary artery of native heart without angina pectoris [I25.10]  Yes    Essential hypertension [I10]  Yes      Resolved Hospital Problems    Diagnosis Date Resolved POA    Type 2 diabetes mellitus with left diabetic foot ulcer [E11.621, L97.529] 10/15/2020 Yes       Future Appointments   Date Time Provider Department Center   10/30/2020 10:30 AM Akua Powell NP [facility]     Follow-up Information     BRAD Baker MD.    Specialty: Internal Medicine  Why: Outpatient Services  Contact information:  [address] [place] [address]  [phone]                     I have seen and examined this patient face to face today. My clinical findings that support the need for the home health skilled services and home bound status are the following:  Requiring assistive device to leave home due to unsteady gait caused by  Surgery.    Allergies:  Review of patient's allergies indicates:   Allergen Reactions    Penicillins Other (See Comments)     PCN allergy as a child - was told he went into a coma. Tolerates Cefazolin without adverse reactions    Shellfish containing products      Other reaction(s): Unknown    Vancomycin Itching      Tolerated vancomycin 7/2020    Bactrim  [sulfamethoxazole-trimethoprim] Rash       Diet: diabetic diet: 2000 calorie    Activities: activity as tolerated    Nursing:   SN to complete comprehensive assessment including routine vital signs. Instruct on disease process and s/s of complications to report to MD. Review/verify medication list sent home with the patient at time of discharge  and instruct patient/caregiver as needed. Frequency may be adjusted depending on start of care date.    Notify MD if SBP > 160 or < 90; DBP > 90 or < 50; HR > 120 or < 50; Temp > 101; Other:   Wound looks infected      CONSULTS:    Physical Therapy to evaluate and treat. Evaluate for home safety and equipment needs; Establish/upgrade home exercise program. Perform / instruct on therapeutic exercises, gait training, transfer training, and Range of Motion.  Occupational Therapy to evaluate and treat. Evaluate home environment for safety and equipment needs. Perform/Instruct on transfers, ADL training, ROM, and therapeutic exercises.    MISCELLANEOUS CARE:  Diabetic Care:   SN to perform and educate Diabetic management with blood glucose monitoring:, Fingerstick blood sugar AC and HS and Report CBG < 60 or > 350 to physician.    WOUND CARE ORDERS  yes:  Surgical Wound:  Location: Left foot    Consult ET nurse        Apply the following to wound:   Other: Leave covered and dry for approximately 1 week until follow up with Podiatry (frequency)    Medications: Review discharge medications with patient and family and provide education.      I certify that this patient is confined to his home and needs physical therapy and speech therapy.

## 2020-10-15 NOTE — CONSULTS
Pharmacokinetic Assessment Follow Up: IV Vancomycin    Vancomycin serum concentration assessment(s):    The trough level was drawn correctly and can be used to guide therapy at this time. The measurement is within the desired definitive target range of 10 to 20 mcg/mL.    Vancomycin Regimen Plan:    Continue regimen to Vancomycin 1750 mg IV every 12 hours with next serum trough concentration measured at 0430 prior to morning dose on 10/16    Drug levels (last 3 results):  Recent Labs   Lab Result Units 10/14/20  1616 10/15/20  0525   Vancomycin-Trough ug/mL 8.8* 15.0       Pharmacy will continue to follow and monitor vancomycin.    Please contact pharmacy at extension [phone] for questions regarding this assessment.    Thank you for the consult,   Jasmyne Baeza       Patient brief summary:  Billy Sheffield Miguel is a 55 y.o. male initiated on antimicrobial therapy with IV Vancomycin for treatment of skin & soft tissue infection    Drug Allergies:   Review of patient's allergies indicates:   Allergen Reactions    Penicillins Other (See Comments)     PCN allergy as a child - was told he went into a coma. Tolerates Cefazolin without adverse reactions    Shellfish containing products      Other reaction(s): Unknown    Vancomycin Itching     Tolerated vancomycin 7/2020    Bactrim  [sulfamethoxazole-trimethoprim] Rash       Actual Body Weight:   117.9 kg    Renal Function:   Estimated Creatinine Clearance: 125.1 mL/min (based on SCr of 0.9 mg/dL).,     Dialysis Method (if applicable):  N/A    CBC (last 72 hours):  Recent Labs   Lab Result Units 10/12/20  2137 10/13/20  0402 10/13/20  0403 10/14/20  0327 10/15/20  0525   WBC K/uL 10.09  --  6.65 6.16 6.25   Hemoglobin g/dL 13.8*  --  12.3* 12.8* 12.6*   Hemoglobin A1C %  --  10.8*  --   --   --    Hematocrit % 40.6  --  37.2* 37.5* 38.0*   Platelets K/uL 261  --  214 252 251   Gran% % 68.4  --  59.8 57.9 63.7   Lymph% % 17.2*  --  23.5 25.0 20.8   Mono% % 12.3  --  13.7  14.1 11.8   Eosinophil% % 1.3  --  2.1 2.1 2.1   Basophil% % 0.4  --  0.6 0.6 0.6   Differential Method  Automated  --  Automated Automated Automated       Metabolic Panel (last 72 hours):  Recent Labs   Lab Result Units 10/12/20  2137 10/13/20  0402 10/14/20  0327 10/15/20  0525   Sodium mmol/L 130* 129* 134* 132*   Potassium mmol/L 4.0 3.9 4.3 4.4   Chloride mmol/L 97 97 100 100   CO2 mmol/L 22* 23 25 26   Glucose mg/dL 328* 436* 337* 308*   BUN, Bld mg/dL 27* 24* 20 19   Creatinine mg/dL 1.2 1.0 1.0 0.9   Albumin g/dL 3.5 3.0* 2.9* 3.0*   Total Bilirubin mg/dL 0.7 0.4 0.4 0.3   Alkaline Phosphatase U/L 94 83 90 94   AST U/L 15 15 13 16   ALT U/L 16 15 14 16   Magnesium mg/dL 1.4* 1.3* 1.6 1.5*   Phosphorus mg/dL  --  3.6 3.8 3.7       Vancomycin Administrations:  vancomycin given in the last 96 hours                   vancomycin 1.75 g in 5 % dextrose 500 mL IVPB (mg) 1,750 mg New Bag 10/15/20 0648     1,750 mg New Bag 10/14/20 1639     1,750 mg New Bag  0300     1,750 mg New Bag 10/13/20 1650    vancomycin (VANCOCIN) 2,500 mg in dextrose 5 % 500 mL IVPB (mg) 2,500 mg New Bag 10/13/20 0324                Microbiologic Results:  Microbiology Results (last 7 days)     Procedure Component Value Units Date/Time    Culture, Anaerobe [[id number]] Collected: 10/14/20 1445    Order Status: Completed Specimen: Bone from Toe, Left Foot Updated: 10/15/20 0901     Anaerobic Culture Culture in progress    Narrative:      2. Left 2nd toe clean margin - culture    Culture, Anaerobe [[id number]] Collected: 10/14/20 1445    Order Status: Completed Specimen: Bone from Toe, Left Foot Updated: 10/15/20 0901     Anaerobic Culture Culture in progress    Narrative:      1. Left 2nd toe - culture    Culture, Anaerobe [[id number]] Collected: 10/14/20 1520    Order Status: Completed Specimen: Bone from Toe, Left Foot Updated: 10/15/20 0901     Anaerobic Culture Culture in progress    Narrative:      5. Metatarsal - culture    Aerobic culture  [[id number]] Collected: 10/14/20 1445    Order Status: Completed Specimen: Bone from Toe, Left Foot Updated: 10/15/20 0714     Aerobic Bacterial Culture No growth    Narrative:      2. Left 2nd toe clean margin - culture    Aerobic culture [[id number]] Collected: 10/14/20 1520    Order Status: Completed Specimen: Bone from Toe, Left Foot Updated: 10/15/20 0714     Aerobic Bacterial Culture No growth    Narrative:      5. Metatarsal - culture    Gram stain [[id number]] Collected: 10/14/20 1520    Order Status: Completed Specimen: Bone from Toe, Left Foot Updated: 10/14/20 1631     Gram Stain Result No WBC's      No organisms seen    Narrative:      5. Metatarsal - culture    Gram stain [[id number]] Collected: 10/14/20 1445    Order Status: Completed Specimen: Bone from Toe, Left Foot Updated: 10/14/20 1630     Gram Stain Result No WBC's      No organisms seen    Narrative:      1. Left 2nd toe - culture    Gram stain [[id number]] Collected: 10/14/20 1445    Order Status: Completed Specimen: Bone from Toe, Left Foot Updated: 10/14/20 1630     Gram Stain Result No WBC's      No organisms seen    Narrative:      2. Left 2nd toe clean margin - culture    Fungus culture [[id number]] Collected: 10/14/20 1445    Order Status: Sent Specimen: Bone from Toe, Left Foot Updated: 10/14/20 1538    AFB Culture & Smear [[id number]] Collected: 10/14/20 1445    Order Status: Sent Specimen: Bone from Toe, Left Foot Updated: 10/14/20 1538    Aerobic culture [[id number]] Collected: 10/14/20 1445    Order Status: Sent Specimen: Bone from Toe, Left Foot Updated: 10/14/20 1537    AFB Culture & Smear [[id number]] Collected: 10/14/20 1445    Order Status: Sent Specimen: Bone from Toe, Left Foot Updated: 10/14/20 1537    Fungus culture [[id number]] Collected: 10/14/20 1445    Order Status: Sent Specimen: Bone from Toe, Left Foot Updated: 10/14/20 1537    Fungus culture [[id number]] Collected: 10/14/20 1520    Order Status: Sent Specimen: Bone from Toe,  Left Foot Updated: 10/14/20 1532    AFB Culture & Smear [[id number]] Collected: 10/14/20 1520    Order Status: Sent Specimen: Bone from Toe, Left Foot Updated: 10/14/20 1532    AFB Culture & Smear [[id number]] Collected: 10/14/20 1517    Order Status: Sent Specimen: Bone from Toe, Left Foot Updated: 10/14/20 1517    Fungus culture [[id number]] Collected: 10/14/20 1517    Order Status: Sent Specimen: Bone from Toe, Left Foot Updated: 10/14/20 1517    Gram stain [[id number]] Collected: 10/14/20 1517    Order Status: Sent Specimen: Bone from Toe, Left Foot Updated: 10/14/20 1517    Culture, Anaerobe [[id number]] Collected: 10/14/20 1517    Order Status: Sent Specimen: Bone from Toe, Left Foot Updated: 10/14/20 1517    Aerobic culture [[id number]] Collected: 10/14/20 1517    Order Status: Sent Specimen: Bone from Toe, Left Foot Updated: 10/14/20 1517

## 2020-10-15 NOTE — PLAN OF CARE
Problem: Physical Therapy Goal  Goal: Physical Therapy Goal  Description: PT evaluation completed - no further PT needs.  Outcome: Met     Cielo Garcias, PT, DPT  10/15/2020

## 2020-10-15 NOTE — DISCHARGE SUMMARY
[facility]  Discharge Note  Short Stay    Procedure(s) (LRB):  AMPUTATION, TOE (Left)    OUTCOME: Patient tolerated treatment/procedure well without complication and is now ready for discharge.    DISPOSITION: Home-Health Care [facility]    FINAL DIAGNOSIS:  Diabetic foot infection    FOLLOWUP: In clinic    DISCHARGE INSTRUCTIONS:    Discharge Procedure Orders   Magnesium   Standing Status: Future Standing Exp. Date: 12/14/21     Basic metabolic panel   Standing Status: Future Standing Exp. Date: 12/14/21     Keep surgical extremity elevated     Notify your health care provider if you experience any of the following:  temperature >100.4     Notify your health care provider if you experience any of the following:  redness, tenderness, or signs of infection (pain, swelling, redness, odor or green/yellow discharge around incision site)     Notify your health care provider if you experience any of the following:  difficulty breathing or increased cough     Notify your health care provider if you experience any of the following:  worsening rash     Leave dressing on - Keep it clean, dry, and intact until clinic visit   Order Comments: 1 week

## 2020-10-15 NOTE — PLAN OF CARE
Problem: Occupational Therapy Goal  Goal: Occupational Therapy Goal  Outcome: Met     Intial eval completed   No goals established; pt at baseline with mobility and ADLs.   Pt d/c from acute OT 10/15/2020    Mariah Fields OTR/L  Pager: [phone]  10/15/2020

## 2020-10-15 NOTE — SUBJECTIVE & OBJECTIVE
Scheduled Meds:   aspirin  81 mg Oral Daily    atorvastatin  80 mg Oral Daily    carvediloL  12.5 mg Oral BID    ciprofloxacin HCl  750 mg Oral Q12H    enoxaparin  40 mg Subcutaneous Q24H    insulin detemir U-100  40 Units Subcutaneous QHS    vancomycin (VANCOCIN) IVPB  1,750 mg Intravenous Q12H     Continuous Infusions:   sodium chloride 0.9% 25 mL/hr at 10/14/20 2033     PRN Meds:acetaminophen, dextrose 50%, dextrose 50%, glucagon (human recombinant), glucose, glucose, insulin aspart U-100, melatonin, ondansetron, polyethylene glycol, sodium chloride 0.9%    Review of patient's allergies indicates:   Allergen Reactions    Penicillins Other (See Comments)     PCN allergy as a child - was told he went into a coma. Tolerates Cefazolin without adverse reactions    Shellfish containing products      Other reaction(s): Unknown    Vancomycin Itching     Tolerated vancomycin 7/2020    Bactrim  [sulfamethoxazole-trimethoprim] Rash        Past Medical History:   Diagnosis Date    Allergy     CAD (coronary artery disease), native coronary artery 6/25/2013    Cancer     COVID-19 virus detected 9/1/2020    Diabetes mellitus     Diabetes mellitus, type 2     Disorder of kidney and ureter     Heart attack 04/2012    Hx of colon cancer, stage I     Hyperlipidemia     Hypertension     Muscular pain     post-op after colonoscopy    NSTEMI May 2013 - peak troponin 0.22 5/22/2013    MICHAELA (obstructive sleep apnea)     Retinopathy due to secondary diabetes      Past Surgical History:   Procedure Laterality Date    COLONOSCOPY N/A 2/17/2017    Procedure: COLONOSCOPY;  Surgeon: Simon Gomez MD;  Location: [facility] (4TH FLR);  Service: Endoscopy;  Laterality: N/A;    CORONARY ANGIOPLASTY      INCISION AND DRAINAGE FOOT Right 6/5/2018    Procedure: INCISION AND DRAINAGE, FOOT;  Surgeon: Bridget Santana DPM;  Location: [facility] OR [address] [place] [address];  Service: Podiatry;  Laterality: Right;  Request mini C arm in room.  request wound vac with medium black sponge    INCISION AND DRAINAGE FOOT Right 6/7/2018    Procedure: INCISION AND DRAINAGE, FOOT;  Surgeon: Emelia Brock DPM;  Location: [facility] OR 2ND FLR;  Service: Podiatry;  Laterality: Right;    INCISION AND DRAINAGE FOOT Left 9/4/2020    Procedure: INCISION AND DRAINAGE, FOOT;  Surgeon: Ainsley Powell DPM;  Location: [facility] OR 2ND FLR;  Service: Podiatry;  Laterality: Left;    INCISION AND DRAINAGE OF ABSCESS Right 6/2/2018    Procedure: INCISION AND DRAINAGE, ABSCESS;  Surgeon: Bridget Santana DPM;  Location: [facility] OR [facility] FLR;  Service: General;  Laterality: Right;    OSTEOTOMY OF METATARSAL BONE  9/4/2020    Procedure: OSTEOTOMY, METATARSAL BONE, 1st METATARSAL RESECTION;  Surgeon: Ainsley Powell DPM;  Location: [facility] OR 2ND FLR;  Service: Podiatry;;    REMOVAL OF FOREIGN BODY FROM FOOT Right 6/7/2018    Procedure: REMOVAL, FOREIGN BODY, FOOT;  Surgeon: Emelia Brock DPM;  Location: [facility] OR 2ND FLR;  Service: Podiatry;  Laterality: Right;    TOE AMPUTATION Left 7/4/2020    Procedure: AMPUTATION, TOE;  Surgeon: Bridget Santana DPM;  Location: [facility] OR [facility] FLR;  Service: Podiatry;  Laterality: Left;    TOE AMPUTATION Left 10/14/2020    Procedure: AMPUTATION, TOE;  Surgeon: Mingo Melara DPM;  Location: [facility] OR 2ND FLR;  Service: Podiatry;  Laterality: Left;  stretcher OK    TONSILLECTOMY         Family History     Problem Relation (Age of Onset)    Heart disease Mother, Brother    No Known Problems Father        Tobacco Use    Smoking status: Never Smoker    Smokeless tobacco: Never Used   Substance and Sexual Activity    Alcohol use: Yes     Comment: rare x1 beer-     Drug use: No    Sexual activity: Not Currently     Review of Systems   Constitutional: Negative for chills and fever.   Cardiovascular: Positive for leg swelling.        Chronic swelling to the LLE     Gastrointestinal: Negative for nausea and vomiting.   Musculoskeletal: Negative for joint  swelling.   Skin: Negative for color change and wound.        Chronic redness to the LLE     Objective:     Vital Signs (Most Recent):  Temp: 96.7 °F (35.9 °C) (10/15/20 0500)  Pulse: 63 (10/15/20 0928)  Resp: 18 (10/15/20 0500)  BP: (!) 140/93 (10/15/20 0928)  SpO2: 97 % (10/15/20 0500) Vital Signs (24h Range):  Temp:  [96.7 °F (35.9 °C)-98.8 °F (37.1 °C)] 96.7 °F (35.9 °C)  Pulse:  [42-63] 63  Resp:  [11-20] 18  SpO2:  [97 %-100 %] 97 %  BP: (109-146)/(63-93) 140/93     Weight: 117.9 kg (259 lb 14.8 oz)  Body mass index is 33.37 kg/m².    Foot Exam    Right Foot/Ankle     Inspection and Palpation  Ecchymosis: none  Tenderness: none   Swelling: none     Neurovascular  Dorsalis pedis: absent  Posterior tibial: absent  Saphenous nerve sensation: diminished  Tibial nerve sensation: diminished  Superficial peroneal nerve sensation: diminished  Deep peroneal nerve sensation: diminished  Sural nerve sensation: diminished      Left Foot/Ankle      Inspection and Palpation  Ecchymosis: none  Tenderness: none   Swelling: none     Neurovascular  Dorsalis pedis: absent  Posterior tibial: absent  Saphenous nerve sensation: diminished  Tibial nerve sensation: diminished  Superficial peroneal nerve sensation: diminished  Deep peroneal nerve sensation: diminished  Sural nerve sensation: diminished            Laboratory:  CBC:   Recent Labs   Lab 10/15/20  0525   WBC 6.25   RBC 4.37*   HGB 12.6*   HCT 38.0*      MCV 87   MCH 28.8   MCHC 33.2     CMP:   Recent Labs   Lab 10/15/20  0525   *   CALCIUM 8.7   ALBUMIN 3.0*   PROT 6.6   *   K 4.4   CO2 26      BUN 19   CREATININE 0.9   ALKPHOS 94   ALT 16   AST 16   BILITOT 0.3     CRP:   Recent Labs   Lab 10/12/20  2137   CRP 21.7*     ESR:   Recent Labs   Lab 10/12/20  2137   SEDRATE 55*       Diagnostic Results:  I have reviewed all pertinent imaging results/findings within the past 24 hours.  Imaging Results          X-Ray Foot Complete Left (Final result)   Result time 10/12/20 22:00:09    Final result by Pasha Hermosillo DO (10/12/20 22:00:09)                 Impression:      1. Second and third toe soft tissue ulcers without radiographic evidence of acute osteomyelitis.  Note that MRI is more sensitive.  2. Postoperative changes of amputation at the great metatarsal midshaft with interval callus.      Electronically signed by: Pasha Hermosillo  Date:    10/12/2020  Time:    22:00             Narrative:    EXAMINATION:  XR FOOT COMPLETE 3 VIEW LEFT    CLINICAL HISTORY:  Erythematous condition, unspecified.    TECHNIQUE:  AP, lateral and oblique views of the left foot were performed.    COMPARISON:  Left foot radiographs from 09/04/2020.    FINDINGS:  There are postoperative changes of great toe amputation at the metatarsal mid shaft with associated interval callus formation.  There is no acute fracture or dislocation of the left foot.  There are soft tissue ulcers of the distal 2nd and 3rd toes without underlying osseous abnormality or radiographic evidence of acute osteomyelitis.  There are small plantar and Achilles calcaneal enthesophytes.  There are vascular calcifications.                                Clinical Findings:  Left 2nd toe ulceration at the distal tuft with overlying eschar. Upon debridement of the eschar, there is exposed distal phalanx. The right 3rd toe had a loose nail with a blister at the proximal nail fold. Upon removal of the nail, healthy granular base without wound is noted. He also has a superficial wound to the distal tuft of the 3rd toe.         10/15/2020  Incision not visualized today.

## 2020-10-15 NOTE — PLAN OF CARE
Patient to be discharged home.  The patient does not have any home needs.  Family provided transportation home.  CM not requested to make any follow up appointments.    Future Appointments   Date Time Provider Department Center   10/30/2020 10:30 AM Akua Powell NP [facility] [place]       10/15/20 1317   Final Note   Assessment Type Final Discharge Note   Anticipated Discharge Disposition Home   Hospital Follow Up  Appt(s) scheduled?   (CM not requested to make any follow up appointments.)   Discharge plans and expectations educations in teach back method with documentation complete? Yes   Right Care Referral Info   Post Acute Recommendation No Care   Post-Acute Status   Discharge Delays None known at this time

## 2020-10-15 NOTE — PLAN OF CARE
Patient to be discharged home.  The patient will have home health upon discharge.  Family to provide transportation home.  Cm not requested to make any follow up appointment.    Future Appointments   Date Time Provider Department Center   10/30/2020 10:30 AM Akua Powell NP [facility] [place]       10/15/20 1418   Final Note   Assessment Type Final Discharge Note   Anticipated Discharge Disposition Home-Health   Discharge plans and expectations educations in teach back method with documentation complete? Yes   Right Care Referral Info   Post Acute Recommendation Home-care   Facility Name [facility]   Post-Acute Status   Post-Acute Authorization Home Health   Home Health Status Referrals Sent   Discharge Delays None known at this time

## 2020-10-15 NOTE — PT/OT/SLP EVAL
"Physical Therapy Evaluation and Discharge Note    Patient Name:  Billy Rivera   MRN:  [id number]    Recommendations:     Discharge Recommendations:  home   Discharge Equipment Recommendations: none   Barriers to discharge: None    Assessment:     Billy Rivera is a 55 y.o. male admitted with a medical diagnosis of Diabetic foot infection. He is post op day 1 s/p L 2nd toe amputation. Today he was able to perform all functional mobility independently without a device.  At this time, patient is functioning at their prior level of function and does not require further acute PT services.     Recent Surgery: Procedure(s) (LRB):  AMPUTATION, TOE (Left) 1 Day Post-Op    Plan:     During this hospitalization, patient does not require further acute PT services.  Please re-consult if situation changes.      Subjective     Chief Complaint: none  Patient/Family Comments/goals: "To go back to work and get the hell out of here"  Pain/Comfort:  · Pain Rating 1: 0/10  · Pain Rating Post-Intervention 1: 0/10    Patients cultural, spiritual, Taoism conflicts given the current situation: no    Living Environment:  Pt lives with his wife in a 1 story home with 3 steps to enter (B handrails). He works full time and drives.    Prior to admission, patients level of function was independent.  Equipment used at home: none.  DME owned (not currently used): none.  Upon discharge, patient will have assistance from his wife.    Objective:     Communicated with RN prior to session.  Patient found getting out of bed with peripheral IV upon PT entry to room.    General Precautions: Standard,     Orthopedic Precautions:N/A   Braces: (DARCO shoe)     Exams:  · Cognitive Exam:  Patient is oriented to Person, Place, Time and Situation  · Gross Motor Coordination:  WFL  · Postural Exam:  Patient presented with the following abnormalities:    · -       No postural abnormalities identified  · RLE ROM: WFL  · RLE Strength: WFL  · LLE ROM: " WFL  · LLE Strength: WFL    Functional Mobility:  · Bed Mobility:     · Supine to Sit: independence  · Transfers:     · Sit to Stand:  independence with no AD  · Bed to Chair: independence with  no AD  using  Step Transfer  · Gait: ~150 feet independently with no device    AM-PAC 6 CLICK MOBILITY  Total Score:24       Therapeutic Activities and Exercises:   Pt educated on safety and to wear his DARCO shoe when up and moving. He is ready to go home.    AM-PAC 6 CLICK MOBILITY  Total Score:24     Patient left sitting EOB with all lines intact and call button in reach.    GOALS:   Multidisciplinary Problems     Physical Therapy Goals     Not on file          Multidisciplinary Problems (Resolved)        Problem: Physical Therapy Goal    Goal Priority Disciplines Outcome Goal Variances Interventions   Physical Therapy Goal   (Resolved)     PT, PT/OT Met     Description: PT evaluation completed - no further PT needs.                   History:     Past Medical History:   Diagnosis Date    Allergy     CAD (coronary artery disease), native coronary artery 6/25/2013    Cancer     COVID-19 virus detected 9/1/2020    Diabetes mellitus     Diabetes mellitus, type 2     Disorder of kidney and ureter     Heart attack 04/2012    Hx of colon cancer, stage I     Hyperlipidemia     Hypertension     Muscular pain     post-op after colonoscopy    NSTEMI May 2013 - peak troponin 0.22 5/22/2013    MICHAELA (obstructive sleep apnea)     Retinopathy due to secondary diabetes        Past Surgical History:   Procedure Laterality Date    COLONOSCOPY N/A 2/17/2017    Procedure: COLONOSCOPY;  Surgeon: Simon Gomez MD;  Location: [facility] (4TH FLR);  Service: Endoscopy;  Laterality: N/A;    CORONARY ANGIOPLASTY      INCISION AND DRAINAGE FOOT Right 6/5/2018    Procedure: INCISION AND DRAINAGE, FOOT;  Surgeon: Bridget Santana DPM;  [facility];  Service: Podiatry;  Laterality: Right;  Request mini C arm in room.  request wound vac with medium black sponge    INCISION AND DRAINAGE FOOT Right 6/7/2018    Procedure: INCISION AND DRAINAGE, FOOT;  Surgeon: Emelia Brock DPM;  Location: [facility] OR 2ND FLR;  Service: Podiatry;  Laterality: Right;    INCISION AND DRAINAGE FOOT Left 9/4/2020    Procedure: INCISION AND DRAINAGE, FOOT;  Surgeon: Ainsley Powell DPM;  Location: [facility] OR 2ND FLR;  Service: Podiatry;  Laterality: Left;    INCISION AND DRAINAGE OF ABSCESS Right 6/2/2018    Procedure: INCISION AND DRAINAGE, ABSCESS;  Surgeon: Bridget Santana DPM;  Location: [facility] OR [facility] FLR;  Service: General;  Laterality: Right;    OSTEOTOMY OF METATARSAL BONE  9/4/2020    Procedure: OSTEOTOMY, METATARSAL BONE, 1st METATARSAL RESECTION;  Surgeon: Ainsley Powell DPM;  Location: [facility] OR 2ND FLR;  Service: Podiatry;;    REMOVAL OF FOREIGN BODY FROM FOOT Right 6/7/2018    Procedure: REMOVAL, FOREIGN BODY, FOOT;  Surgeon: Emelia Brock DPM;  Location: [facility] OR 2ND FLR;  Service: Podiatry;  Laterality: Right;    TOE AMPUTATION Left 7/4/2020    Procedure: AMPUTATION, TOE;  Surgeon: Bridget Santana DPM;  Location: [facility] OR 2ND FLR;  Service: Podiatry;  Laterality: Left;    TOE AMPUTATION Left 10/14/2020    Procedure: AMPUTATION, TOE;  Surgeon: Mingo Melara DPM;  Location: [facility] OR 2ND FLR;  Service: Podiatry;  Laterality: Left;  stretcher OK    TONSILLECTOMY         Time Tracking:     PT Received On: 10/15/20  PT Start Time: 0928     PT Stop Time: 0938  PT Total Time (min): 10 min     Billable Minutes: Evaluation 10      Cielo Garcias, PT  10/15/2020

## 2020-10-15 NOTE — PLAN OF CARE
10/15/20 6564   Post-Acute Status   Post-Acute Authorization Other   Other Status No Post-Acute Service Needs       No SW needs noted upon D/C.  SW in contact with CM and Medical staff. Will continue to follow and offer support as needed.     Christiano Brunson, GILMA  [facility]   Ext. [phone]

## 2020-10-15 NOTE — PLAN OF CARE
10/15/20 1419   Post-Acute Status   Post-Acute Authorization Home Health   Home Health Status Referrals Sent       Pt changed mind and needs  for dressing changes. [facility] chosen. Awaiting acceptance.  SW in contact with  and Medical staff. Will continue to follow and offer support as needed.     Victor Blanchard, LMSW [facility]   Ext. [phone]    3:25 PM      [facility] can't accept the patient. Referral sent to family Homecare.  MARTIR in contact with  and Medical staff. Will continue to follow and offer support as needed.     Victor Blanchard, LMSW [facility]   Ext. [phone]    4:00 PM    [facility]  [facility] to [facility].  SW in contact with  and Medical staff. Will continue to follow and offer support as needed.     Victor Blanchard, LMSW [facility]   Ext. [phone]    4:22 PM   [facility] Declined  Referral sent to [facility]. SW in contact with  and Medical staff. Will continue to follow and offer support as needed.     Victor Blanchard, LMSW [facility]   Ext. [phone]

## 2020-10-15 NOTE — DISCHARGE SUMMARY
[facility] Hospital Medicine  Discharge Summary      Patient Name: Billy Rivera  MRN: [id number]  Admission Date: 10/12/2020  Hospital Length of Stay: 3 days  Discharge Date and Time:  10/15/2020 2:16 PM  Attending Physician: Brian Trivedi MD   Discharging Provider: Jevon Fall MD  Primary Care Provider: BRAD Baker MD      HPI:   Billy Rivera is a 54 yo M with PMHx of insulin dependent T2DM, osteomyelitis of L 1st metatarsal s/p amputation on 7/4/20, PVA, CAD, HLD, and HTN who presented to [facility] ED due to new diabetic foot ulceration of L 2nd and 3rd toes. He says he was walking around today wearing a large and wide shoe - when he took his shoe and bandage off, he noticed a new ulceration and the 2nd toenail fell off. He has no pain sensation of the foot, but it is erythematous, edematous, and warm to touch. He has a remote history of poor wound healing after amputation of L great toe, multiple infections requiring antibiotics in the past few months due to polymicrobial species. Latest abx treatment with Ceftriaxone x10 days for GBS SSTI and PO Flagyl for Finegoldia osteomyelitis (End date 10/9/2020) He follows with podiatry outpatient. Pt denies fever, chills, fatigue, CP, abd pain, nausea/vomiting, diarrhea/constipation, urinary symptoms, or any other symptom at this time.     ED Course: Vitals: afebrile 97.9F, HR 78, 127/80, 98% on RA.  WBC 10.09, Lactate neg, increased inflammatory markers - CRP 21.7, Sed Rate 51  X-ray L foot: soft tissue ulcers with no evidence of osteomyelitis  Ceftriaxone given in ED. Admitted to hospital medicine for further management.    Procedure(s) (LRB):  AMPUTATION, TOE (Left)      Hospital Course:   Patient seen by podiatry and wanted to avoid prolonged antibiotics. Was taken to the operating room the following day for surgical debridement and amputation. Cultures were collected and sent. Patient able to ambulate and function at previous level  without any PT/OT needs. Podiatry recommended doxycycline for outpatient treatment.     Consults:   Consults (From admission, onward)        Status Ordering Provider     Inpatient consult to Podiatry  Once     Provider:  (Not yet assigned)    Completed EVER LIMON     Pharmacy to dose Vancomycin consult  Once     Provider:  (Not yet assigned)    Acknowledged EVER LIMON          * Diabetic foot infection  Plan:  - Doxycycline 100 mg BID for 10 days  - Keep foot dry and elevated  - Follow up with podiatry in 1 week for wound check.      PVD (peripheral vascular disease)  - Cont home meds    Coronary artery disease involving native coronary artery of native heart without angina pectoris  - Cont home ASA 81 and Atorvastatin 80      Essential hypertension  - Cont. home Coreg 12.5 BID   - Hold Lisinopril 40 for possible surgery in the am        Final Active Diagnoses:    Diagnosis Date Noted POA    PRINCIPAL PROBLEM:  Diabetic foot infection [E11.628, L08.9] 09/01/2020 Yes    Hypomagnesemia [E83.42] 06/18/2018 Yes    Hyponatremia [E87.1] 06/15/2018 Yes    Type 2 diabetes mellitus with diabetic peripheral angiopathy without gangrene, with long-term current use of insulin [E11.51, Z79.4] 04/02/2017 Not Applicable    PVD (peripheral vascular disease) [I73.9] 06/15/2015 Yes    Coronary artery disease involving native coronary artery of native heart without angina pectoris [I25.10] 06/25/2013 Yes    Essential hypertension [I10] 09/27/2012 Yes      Problems Resolved During this Admission:    Diagnosis Date Noted Date Resolved POA    Type 2 diabetes mellitus with left diabetic foot ulcer [E11.621, L97.529] 10/12/2020 10/15/2020 Yes       Discharged Condition: good    Disposition: Home or Self Care    Follow Up:  Follow-up Information     P Shree Baker MD.    Specialty: Internal Medicine  Why: Outpatient Services  Contact information:  [address] [facility] [place]  [place]  [address]  [phone]             [id number] In 1 week.    Specialty: Podiatry  Why: For wound re-check  Contact information:  [address]  [facility] [address] [phone]  Additional information:  [facility] - Main Building, 5th Floor   Please park in [address] and use Atrium elevator               Patient Instructions:      Magnesium   Standing Status: Future Standing Exp. Date: 12/14/21     Basic metabolic panel   Standing Status: Future Standing Exp. Date: 12/14/21     Keep surgical extremity elevated     Notify your health care provider if you experience any of the following:  temperature >100.4     Notify your health care provider if you experience any of the following:  redness, tenderness, or signs of infection (pain, swelling, redness, odor or green/yellow discharge around incision site)     Notify your health care provider if you experience any of the following:  difficulty breathing or increased cough     Notify your health care provider if you experience any of the following:  worsening rash     Leave dressing on - Keep it clean, dry, and intact until clinic visit   Order Comments: 1 week       Significant Diagnostic Studies: Labs:   BMP:   Recent Labs   Lab 10/14/20  0327 10/15/20  0525   * 308*   * 132*   K 4.3 4.4    100   CO2 25 26   BUN 20 19   CREATININE 1.0 0.9   CALCIUM 8.7 8.7   MG 1.6 1.5*   , CMP   Recent Labs   Lab 10/14/20  0327 10/15/20  0525   * 132*   K 4.3 4.4    100   CO2 25 26   * 308*   BUN 20 19   CREATININE 1.0 0.9   CALCIUM 8.7 8.7   PROT 6.6 6.6   ALBUMIN 2.9* 3.0*   BILITOT 0.4 0.3   ALKPHOS 90 94   AST 13 16   ALT 14 16   ANIONGAP 9 6*   ESTGFRAFRICA >60.0 >60.0   EGFRNONAA >60.0 >60.0    and CBC   Recent Labs   Lab 10/14/20  0327 10/15/20  0525   WBC 6.16 6.25   HGB 12.8* 12.6*   HCT 37.5* 38.0*    251     Microbiology:   Blood Culture   Lab Results   Component Value  "Date    LABBLOO No growth after 5 days. 09/01/2020   , Sputum Culture No results found for: GSRESP, RESPIRATORYC, Urine Culture  No results found for: LABURIN and Wound Culture: Pending growth  Radiology: MRI: Osteomyelitis    Pending Diagnostic Studies:     Procedure Component Value Units Date/Time    Specimen to Pathology, Surgery Other [[id number]] Collected: 10/14/20 1517    Order Status: Sent Lab Status: In process Updated: 10/14/20 1721    Specimen to Pathology, Surgery Other [[id number]] Collected: 10/14/20 1446    Order Status: Sent Lab Status: In process Updated: 10/14/20 1446         Medications:  Reconciled Home Medications:      Medication List      START taking these medications    * doxycycline 100 MG Cap  Commonly known as: VIBRAMYCIN  Take 1 capsule (100 mg total) by mouth every 12 (twelve) hours. for 10 days     * doxycycline 100 MG Cap  Commonly known as: VIBRAMYCIN  Take 1 capsule (100 mg total) by mouth every 12 (twelve) hours.         * This list has 2 medication(s) that are the same as other medications prescribed for you. Read the directions carefully, and ask your doctor or other care provider to review them with you.            CHANGE how you take these medications    pen needle, diabetic 31 gauge x 3/16" Ndle  Inject 1 each into the skin 3 (three) times daily before meals.  What changed: when to take this        CONTINUE taking these medications    acetaminophen 325 MG tablet  Commonly known as: TYLENOL  Take 325 mg by mouth every 6 (six) hours as needed for Pain.     aspirin 81 MG EC tablet  Commonly known as: ECOTRIN  Take 1 tablet (81 mg total) by mouth once daily.     atorvastatin 80 MG tablet  Commonly known as: LIPITOR  Take 1 tablet (80 mg total) by mouth once daily.     BC HEADACHE POWDER ORAL  Take 1 Package by mouth daily as needed (pain).     blood sugar diagnostic Strp  Strips and lancets    Use before meals and bedtime     carvediloL 12.5 MG tablet  Commonly known as: COREG  Take " 1 tablet (12.5 mg total) by mouth 2 (two) times daily.     ibuprofen 200 MG tablet  Commonly known as: ADVIL,MOTRIN  Take 200 mg by mouth every 6 (six) hours as needed for Pain.     insulin glargine 100 unit/mL injection  Commonly known as: LANTUS  Inject 55 Units into the skin every evening.     insulin regular 100 unit/mL injection  Inject 20 Units into the skin 3 (three) times daily before meals.     lisinopriL 40 MG tablet  Commonly known as: PRINIVIL,ZESTRIL  Take 1 tablet (40 mg total) by mouth once daily.     metFORMIN 1000 MG tablet  Commonly known as: GLUCOPHAGE  Take 1 tablet (1,000 mg total) by mouth 2 (two) times daily with meals.            Indwelling Lines/Drains at time of discharge:   Lines/Drains/Airways     None                 Time spent on the discharge of patient: 60 minutes  Patient was seen and examined on the date of discharge and determined to be suitable for discharge.         Jevon Fall MD   PGY - 1 Internal Medicine   Department of Hospital Medicine [facility]

## 2020-10-15 NOTE — PROGRESS NOTES
[facility]  Podiatry  Progress Note    Patient Name: Billy Sheffield [place]  MRN: [id number]  Admission Date: 10/12/2020  Hospital Length of Stay: 3 days  Attending Physician: Brian Trivedi MD  Primary Care Provider: BRAD Baker MD   Scheduled Meds:   aspirin  81 mg Oral Daily    atorvastatin  80 mg Oral Daily    carvediloL  12.5 mg Oral BID    ciprofloxacin HCl  750 mg Oral Q12H    enoxaparin  40 mg Subcutaneous Q24H    insulin detemir U-100  40 Units Subcutaneous QHS    vancomycin (VANCOCIN) IVPB  1,750 mg Intravenous Q12H     Continuous Infusions:   sodium chloride 0.9% 25 mL/hr at 10/14/20 2033     PRN Meds:acetaminophen, dextrose 50%, dextrose 50%, glucagon (human recombinant), glucose, glucose, insulin aspart U-100, melatonin, ondansetron, polyethylene glycol, sodium chloride 0.9%    Review of patient's allergies indicates:   Allergen Reactions    Penicillins Other (See Comments)     PCN allergy as a child - was told he went into a coma. Tolerates Cefazolin without adverse reactions    Shellfish containing products      Other reaction(s): Unknown    Vancomycin Itching     Tolerated vancomycin 7/2020    Bactrim  [sulfamethoxazole-trimethoprim] Rash        Past Medical History:   Diagnosis Date    Allergy     CAD (coronary artery disease), native coronary artery 6/25/2013    Cancer     COVID-19 virus detected 9/1/2020    Diabetes mellitus     Diabetes mellitus, type 2     Disorder of kidney and ureter     Heart attack 04/2012    Hx of colon cancer, stage I     Hyperlipidemia     Hypertension     Muscular pain     post-op after colonoscopy    NSTEMI May 2013 - peak troponin 0.22 5/22/2013    MICHAELA (obstructive sleep apnea)     Retinopathy due to secondary diabetes      Past Surgical History:   Procedure Laterality Date    COLONOSCOPY N/A 2/17/2017    Procedure: COLONOSCOPY;  Surgeon: Simon Gomez MD;  Location: [facility] ([address] [place] [address]);  Service: Endoscopy;   Laterality: N/A;    CORONARY ANGIOPLASTY      INCISION AND DRAINAGE FOOT Right 6/5/2018    Procedure: INCISION AND DRAINAGE, FOOT;  Surgeon: Bridget Santana DPM;  Location: [facility] OR 1ST FLR;  Service: Podiatry;  Laterality: Right;  Request mini C arm in room. request wound vac with medium black sponge    INCISION AND DRAINAGE FOOT Right 6/7/2018    Procedure: INCISION AND DRAINAGE, FOOT;  Surgeon: Emelia Brock DPM;  Location: [facility] OR 2ND FLR;  Service: Podiatry;  Laterality: Right;    INCISION AND DRAINAGE FOOT Left 9/4/2020    Procedure: INCISION AND DRAINAGE, FOOT;  Surgeon: Ainsley Powell DPM;  Location: [facility] OR 2ND FLR;  Service: Podiatry;  Laterality: Left;    INCISION AND DRAINAGE OF ABSCESS Right 6/2/2018    Procedure: INCISION AND DRAINAGE, ABSCESS;  Surgeon: Bridget Santana DPM;  Location: [facility] OR 2ND FLR;  Service: General;  Laterality: Right;    OSTEOTOMY OF METATARSAL BONE  9/4/2020    Procedure: OSTEOTOMY, METATARSAL BONE, 1st METATARSAL RESECTION;  Surgeon: Ainsley Powell DPM;  Location: [facility] OR 2ND FLR;  Service: Podiatry;;    REMOVAL OF FOREIGN BODY FROM FOOT Right 6/7/2018    Procedure: REMOVAL, FOREIGN BODY, FOOT;  Surgeon: Emelia Brock DPM;  Location: [facility] OR 2ND FLR;  Service: Podiatry;  Laterality: Right;    TOE AMPUTATION Left 7/4/2020    Procedure: AMPUTATION, TOE;  Surgeon: Bridget Santana DPM;  Location: [facility] OR 2ND FLR;  Service: Podiatry;  Laterality: Left;    TOE AMPUTATION Left 10/14/2020    Procedure: AMPUTATION, TOE;  Surgeon: Mingo Melara DPM;  Location: [facility] OR 2ND FLR;  Service: Podiatry;  Laterality: Left;  stretcher OK    TONSILLECTOMY         Family History     Problem Relation (Age of Onset)    Heart disease Mother, Brother    No Known Problems Father        Tobacco Use    Smoking status: Never Smoker    Smokeless tobacco: Never Used   Substance and Sexual Activity    Alcohol use: Yes     Comment: rare x1 beer-     Drug use: No    Sexual activity:  Not Currently     Review of Systems   Constitutional: Negative for chills and fever.   Cardiovascular: Positive for leg swelling.        Chronic swelling to the LLE     Gastrointestinal: Negative for nausea and vomiting.   Musculoskeletal: Negative for joint swelling.   Skin: Negative for color change and wound.        Chronic redness to the LLE     Objective:     Vital Signs (Most Recent):  Temp: 96.7 °F (35.9 °C) (10/15/20 0500)  Pulse: 63 (10/15/20 0928)  Resp: 18 (10/15/20 0500)  BP: (!) 140/93 (10/15/20 0928)  SpO2: 97 % (10/15/20 0500) Vital Signs (24h Range):  Temp:  [96.7 °F (35.9 °C)-98.8 °F (37.1 °C)] 96.7 °F (35.9 °C)  Pulse:  [42-63] 63  Resp:  [11-20] 18  SpO2:  [97 %-100 %] 97 %  BP: (109-146)/(63-93) 140/93     Weight: 117.9 kg (259 lb 14.8 oz)  Body mass index is 33.37 kg/m².    Foot Exam    Right Foot/Ankle     Inspection and Palpation  Ecchymosis: none  Tenderness: none   Swelling: none     Neurovascular  Dorsalis pedis: absent  Posterior tibial: absent  Saphenous nerve sensation: diminished  Tibial nerve sensation: diminished  Superficial peroneal nerve sensation: diminished  Deep peroneal nerve sensation: diminished  Sural nerve sensation: diminished      Left Foot/Ankle      Inspection and Palpation  Ecchymosis: none  Tenderness: none   Swelling: none     Neurovascular  Dorsalis pedis: absent  Posterior tibial: absent  Saphenous nerve sensation: diminished  Tibial nerve sensation: diminished  Superficial peroneal nerve sensation: diminished  Deep peroneal nerve sensation: diminished  Sural nerve sensation: diminished            Laboratory:  CBC:   Recent Labs   Lab 10/15/20  0525   WBC 6.25   RBC 4.37*   HGB 12.6*   HCT 38.0*      MCV 87   MCH 28.8   MCHC 33.2     CMP:   Recent Labs   Lab 10/15/20  0525   *   CALCIUM 8.7   ALBUMIN 3.0*   PROT 6.6   *   K 4.4   CO2 26      BUN 19   CREATININE 0.9   ALKPHOS 94   ALT 16   AST 16   BILITOT 0.3     CRP:   Recent Labs   Lab  10/12/20  2137   CRP 21.7*     ESR:   Recent Labs   Lab 10/12/20  2137   SEDRATE 55*       Diagnostic Results:  I have reviewed all pertinent imaging results/findings within the past 24 hours.  Imaging Results          X-Ray Foot Complete Left (Final result)  Result time 10/12/20 22:00:09    Final result by Pasha Hermosillo DO (10/12/20 22:00:09)                 Impression:      1. Second and third toe soft tissue ulcers without radiographic evidence of acute osteomyelitis.  Note that MRI is more sensitive.  2. Postoperative changes of amputation at the great metatarsal midshaft with interval callus.      Electronically signed by: Pasha Hermosillo  Date:    10/12/2020  Time:    22:00             Narrative:    EXAMINATION:  XR FOOT COMPLETE 3 VIEW LEFT    CLINICAL HISTORY:  Erythematous condition, unspecified.    TECHNIQUE:  AP, lateral and oblique views of the left foot were performed.    COMPARISON:  Left foot radiographs from 09/04/2020.    FINDINGS:  There are postoperative changes of great toe amputation at the metatarsal mid shaft with associated interval callus formation.  There is no acute fracture or dislocation of the left foot.  There are soft tissue ulcers of the distal 2nd and 3rd toes without underlying osseous abnormality or radiographic evidence of acute osteomyelitis.  There are small plantar and Achilles calcaneal enthesophytes.  There are vascular calcifications.                                Clinical Findings:  Left 2nd toe ulceration at the distal tuft with overlying eschar. Upon debridement of the eschar, there is exposed distal phalanx. The right 3rd toe had a loose nail with a blister at the proximal nail fold. Upon removal of the nail, healthy granular base without wound is noted. He also has a superficial wound to the distal tuft of the 3rd toe.         10/15/2020  Incision not visualized today.     Assessment/Plan:     Type 2 diabetes mellitus with left diabetic foot ulcer  Patient s/p  partial toe amputation and bone biopsy of the left foot.    Plan:  - Patient is okay for discharge from Podiatry perspective. Cultures and path can be followed as OP.  - Patient to keep dressing clean, dry and intact until follow up in 1 week.  - Patient can weight bear as tolerated in his surgical shoe.  - Recommend PO antibiotics for SSTI for 7-10 days following surgery. Doxy 100 mg b.i.d.      PVD (peripheral vascular disease)  Per primary.    VAS VASILIY's from previous admission. Patient appears to have sufficient flow to the toes for healing.         Kenneth Perez MD  Podiatry  [facility]

## 2020-10-15 NOTE — PLAN OF CARE
Problem: Fall Injury Risk  Goal: Absence of Fall and Fall-Related Injury  Outcome: Ongoing, Progressing  Intervention: Promote Injury-Free Environment  Flowsheets (Taken 10/15/2020 0401)  Safety Promotion/Fall Prevention:   Fall Risk signage in place   Fall Risk reviewed with patient/family   lighting adjusted   nonskid shoes/socks when out of bed  Environmental Safety Modification:   assistive device/personal items within reach   clutter free environment maintained   lighting adjusted     Problem: Adult Inpatient Plan of Care  Goal: Plan of Care Review  Outcome: Ongoing, Progressing  Flowsheets (Taken 10/15/2020 0401)  Plan of Care Reviewed With: patient     Problem: Diabetes Comorbidity  Goal: Blood Glucose Level Within Desired Range  Outcome: Ongoing, Progressing  Intervention: Maintain Glycemic Control  Flowsheets (Taken 10/15/2020 0401)  Glycemic Management: blood glucose monitoring    POC reviewed with pt at bedside.  Pt verbalized understanding.  Educated on hygiene and the importance of blood glucose monitoring.  OOB to bathroom multiple times.  Monitored while OOB. Dsng (left foot) intact.  No falls.  No acute event.  No changes in neuro noted.  Questions and concerns addressed. BS and VS monitored.  See VS infor in the flowsheet.  Safety and fall precautions maintained.  Call light within reach.  WCTM.

## 2020-10-15 NOTE — PT/OT/SLP EVAL
Occupational Therapy   Evaluation and Discharge Note    Name: Billy Rivera  MRN: [id number]  Admitting Diagnosis:  Diabetic foot infection 1 Day Post-Op    Recommendations:     Discharge Recommendations: home  Discharge Equipment Recommendations:  none  Barriers to discharge:  None    Assessment:     Billy Rivera is a 55 y.o. male with a medical diagnosis of Diabetic foot infection. At this time, patient is functioning at their prior level of function and does not require further acute OT services.     Plan:     During this hospitalization, patient does not require further acute OT services.  Please re-consult if situation changes.    · Plan of Care Reviewed with: patient    Subjective     Chief Complaint: none stated   Patient/Family Comments/goals: to get better and return home     Occupational Profile:  Living Environment: Pt lives with his wife in a H with 3 DA and B HR present. Pt has a tub/shower combo with no DME present. Pt owns the following DME but was not using: RW, shower chair, BSC, and SPC. Pt was working as a  and was driving.   Previous level of function: PTA, pt was (I) with ADLs and functional mobility   Roles and Routines:    Equipment Used at home:  none  Assistance upon Discharge: Pt will have assistance from family upon d/c.     Pain/Comfort:  · Pain Rating 1: 0/10  · Pain Rating Post-Intervention 1: 0/10    Patients cultural, spiritual, Buddhism conflicts given the current situation: no    Objective:     Communicated with: RN prior to session.  Patient found standing in room  with telemetry upon OT entry to room. Pt agreeable to therapy     General Precautions: Standard, (no functional deficts noted)   Orthopedic Precautions: WBAT for L    Braces: (DARCO shoe)     Occupational Performance:    Bed Mobility:    · Patient completed Supine to Sit with independence    Functional Mobility/Transfers:  · Patient completed Sit <> Stand Transfer with independence  with  no  assistive device   · Functional Mobility: Pt engaging in functional mobility to simulate household/community distances  with independence and utilizing no AD in order to maximize functional activity tolerance and standing balance required for engagement in occupations of choice.   · L DARCO donned while OOB    Activities of Daily Living:  · Lower Body Dressing: independence donning DARCO shoe and tennis shoe while sitting EOB   · Toileting: independence voiding into toilet in standing     Cognitive/Visual Perceptual:  Cognitive/Psychosocial Skills:     -       Oriented to: Person, Place, Time and Situation   -       Follows Commands/attention:Follows multistep  commands  -       Communication: clear/fluent  -       Memory: No Deficits noted  -       Safety awareness/insight to disability: intact   -       Mood/Affect/Coping skills/emotional control: Appropriate to situation  Visual/Perceptual:      -Intact      Physical Exam:  Balance:     Static sit: (I)   Dynamic sit: (I)   Static standing: (I)   Dynamic Standing: (I)    Postural examination/scapula alignment:    -       No postural abnormalities identified  Skin integrity: Visible skin intact  Edema:  None noted  Sensation:    -       Intact  Dominant hand:    -       right   Upper Extremity Range of Motion:     -       Right Upper Extremity: WNL  -       Left Upper Extremity: WNL  Upper Extremity Strength:    -       Right Upper Extremity: WNL  -       Left Upper Extremity: WNL   Strength:    -       Right Upper Extremity: WNL  -       Left Upper Extremity: WNL    AMPAC 6 Click ADL:  AMPAC Total Score: 24    Treatment & Education:   Pt educated on role of OT, POC, and goals for therapy.     POC was dicussed with patient/caregiver, who was included in its development and is in agreement with the identified goals and treatment plan.    Pt educated on importance of wearing DARCO shoe during OOB mobility in order to protect postop amputation of L 1st  digit of foot.    Time provided for therapeutic counseling and discussion of health disposition.    Educated on importance of EOB/OOB mobility, maintaining routine, sitting up in chair, and maximizing independence with ADLs during admission    Pt completed ADLs and functional mobility for treatment session as noted above    Pt/caregiver verbalized understanding and expressed no further concerns/questions.   Updated communication board   Education:    Patient left sitting EOB  with all lines intact, call button in reach and RN notified    GOALS:   Multidisciplinary Problems     Occupational Therapy Goals     Not on file          Multidisciplinary Problems (Resolved)        Problem: Occupational Therapy Goal    Goal Priority Disciplines Outcome Interventions   Occupational Therapy Goal   (Resolved)     OT, PT/OT Met                    History:     Past Medical History:   Diagnosis Date    Allergy     CAD (coronary artery disease), native coronary artery 6/25/2013    Cancer     COVID-19 virus detected 9/1/2020    Diabetes mellitus     Diabetes mellitus, type 2     Disorder of kidney and ureter     Heart attack 04/2012    Hx of colon cancer, stage I     Hyperlipidemia     Hypertension     Muscular pain     post-op after colonoscopy    NSTEMI May 2013 - peak troponin 0.22 5/22/2013    MICHAELA (obstructive sleep apnea)     Retinopathy due to secondary diabetes        Past Surgical History:   Procedure Laterality Date    COLONOSCOPY N/A 2/17/2017    Procedure: COLONOSCOPY;  Surgeon: Simon Gomez MD;  Location: [facility] (4TH FLR);  Service: Endoscopy;  Laterality: N/A;    CORONARY ANGIOPLASTY      INCISION AND DRAINAGE FOOT Right 6/5/2018    Procedure: INCISION AND DRAINAGE, FOOT;  Surgeon: Bridget Santana DPM;  [facility];  Service: Podiatry;  Laterality: Right;  Request mini C arm in room. request wound vac with medium black sponge    INCISION AND DRAINAGE FOOT Right 6/7/2018     Procedure: INCISION AND DRAINAGE, FOOT;  Surgeon: Emelia Brock DPM;  Location: [facility] OR 2ND FLR;  Service: Podiatry;  Laterality: Right;    INCISION AND DRAINAGE FOOT Left 9/4/2020    Procedure: INCISION AND DRAINAGE, FOOT;  Surgeon: Ainsley Powell DPM;  Location: [facility] OR 2ND FLR;  Service: Podiatry;  Laterality: Left;    INCISION AND DRAINAGE OF ABSCESS Right 6/2/2018    Procedure: INCISION AND DRAINAGE, ABSCESS;  Surgeon: Bridget Santana DPM;  Location: [facility] OR 2ND FLR;  Service: General;  Laterality: Right;    OSTEOTOMY OF METATARSAL BONE  9/4/2020    Procedure: OSTEOTOMY, METATARSAL BONE, 1st METATARSAL RESECTION;  Surgeon: Ainsley Powell DPM;  [facility]: [facility] 2ND FLR;  Service: Podiatry;;    REMOVAL OF FOREIGN BODY FROM FOOT Right 6/7/2018    Procedure: REMOVAL, FOREIGN BODY, FOOT;  Surgeon: Emelia Brock DPM;  [facility] 2ND FLR;  Service: Podiatry;  Laterality: Right;    TOE AMPUTATION Left 7/4/2020    Procedure: AMPUTATION, TOE;  Surgeon: Bridget Santana DPM;  [facility] 2ND FLR;  Service: Podiatry;  Laterality: Left;    TOE AMPUTATION Left 10/14/2020    Procedure: AMPUTATION, TOE;  Surgeon: Mingo Melara DPM;  Location[facility] 2ND FLR;  Service: Podiatry;  Laterality: Left;  stretcher OK    TONSILLECTOMY         Time Tracking:     OT Date of Treatment: 10/15/20  OT Start Time: 0929  OT Stop Time: 0937  OT Total Time (min): 8 min    Billable Minutes:Evaluation 8 (co-eval with PT)    Mariah Fields OT  10/15/2020

## 2020-10-15 NOTE — ASSESSMENT & PLAN NOTE
Patient s/p partial toe amputation and bone biopsy of the left foot.    Plan:  - Patient is okay for discharge from Podiatry perspective. Cultures and path can be followed as OP.  - Patient to keep dressing clean, dry and intact until follow up in 1 week.  - Patient can weight bear as tolerated in his surgical shoe.  - Recommend PO antibiotics for SSTI for 7-10 days following surgery. Doxy 100 mg b.i.d.

## 2020-10-16 ENCOUNTER — PATIENT OUTREACH (OUTPATIENT)
Dept: ADMINISTRATIVE | Facility: CLINIC | Age: 55
End: 2020-10-16

## 2020-10-16 ENCOUNTER — TELEPHONE (OUTPATIENT)
Dept: PODIATRY | Facility: CLINIC | Age: 55
End: 2020-10-16

## 2020-10-16 NOTE — TELEPHONE ENCOUNTER
----- Message from Kenneth Perez MD sent at 10/15/2020  6:56 PM CDT -----  Regarding: follow up  Patient had surgery with Dr. Melara. Please schedule post op for him next week. Thanks

## 2020-10-16 NOTE — TELEPHONE ENCOUNTER
Dr. Cooper will call [facility] and give them a verbal order. Patient has and appointment with Dr. Hodges on next week     ----- Message from Ximena Rodriguez sent at 10/16/2020  3:51 PM CDT -----  Regarding: [facility]  calling the dressing came off she doesn't have any wound care orders. Asking for a call back for pt wound     Contact info [phone]

## 2020-10-16 NOTE — PLAN OF CARE
10/16/20 1342   Post-Acute Status   Post-Acute Authorization Home Health   Home Health Status Set-up Complete       Accepted by [facility]. SW in contact with CM and Medical staff. Will continue to follow and offer support as needed.     Victor Blanchard, LMSW [facility]   Ext. [phone]

## 2020-10-16 NOTE — PATIENT INSTRUCTIONS
Understanding Sepsis  Sepsis is a severe response the body has to an infection. It is most often caused by bacteria. It is also known as septicemia, or systemic inflammatory response syndrome (SIRS). Sepsis is a medical emergency. It needs to be treated right away.  What is sepsis?  Sepsis is when the body reacts to an infection with a severe inflammatory response. It can be caused by bacteria, fungus, or a virus. Sepsis can cause many kinds of problems around the body. It can lead severe low blood pressure (shock) and organ failure. This can lead to death if not treated.  Sepsis is most common in:  · Infants and older adults  · People with an infection such as pneumonia, meningitis, or a urinary tract infection  · People who have an illness such as cancer, AIDS, or diabetes  · People being treated with chemotherapy medications or radiation  · People who have had a transplant  Symptoms of sepsis  Symptoms of sepsis can include:  · Chills and shaking  · Rapid heartbeat  · Rapid breathing  · Shortness of breath  · Severe nausea or uncontrolled vomiting  · Confusion  · Dizziness  · Decreased urination  · Severe pain, including in the back or joints   Diagnosing sepsis  If your health care provider thinks you may have sepsis, you will be given tests. You may have blood and urine tests. These are done to look for bacteria, viruses, or fungus. You may also have X-rays or other imaging tests. These may be done to look at your organs to locate the source of infection.  Treating sepsis  If you have sepsis, your health care provider will give you antibiotics through a thin, flexible tube put into a vein in your arm (IV). You will also be given fluids through the IV. You may also be given nutrition or other medications through your IV. Your health care provider will talk with you about other treatments you may need. These may include using an oxygen mask or a ventilator to help with breathing. Treatment may last at least 7  to 10 days. Sepsis must be treated in the hospital.  Date Last Reviewed: 7/15/2015  © 2877-0912 [employer]. [address] [place] [address], [place] [address]. All rights reserved. This information is not intended as a substitute for professional medical care. Always follow your healthcare professional's instructions.

## 2020-10-17 LAB
BACTERIA SPEC AEROBE CULT: NO GROWTH
BACTERIA SPEC AEROBE CULT: NO GROWTH

## 2020-10-17 PROCEDURE — G0180 MD CERTIFICATION HHA PATIENT: HCPCS | Mod: ,,, | Performed by: INTERNAL MEDICINE

## 2020-10-17 PROCEDURE — G0180 PR HOME HEALTH MD CERTIFICATION: ICD-10-PCS | Mod: ,,, | Performed by: INTERNAL MEDICINE

## 2020-10-18 LAB — BACTERIA SPEC AEROBE CULT: ABNORMAL

## 2020-10-19 LAB
BACTERIA SPEC ANAEROBE CULT: NORMAL
FINAL PATHOLOGIC DIAGNOSIS: NORMAL
GROSS: NORMAL
Lab: NORMAL

## 2020-10-21 ENCOUNTER — PATIENT OUTREACH (OUTPATIENT)
Dept: ADMINISTRATIVE | Facility: OTHER | Age: 55
End: 2020-10-21

## 2020-10-21 NOTE — PROGRESS NOTES
LINKS immunization registry not responding  Care Everywhere updated  Health Maintenance updated  Chart reviewed for overdue Proactive [facility] ([facility]) health maintenance testing (CRS, Breast Ca, Diabetic Eye Exam)   Orders entered:N/A

## 2020-10-22 ENCOUNTER — OFFICE VISIT (OUTPATIENT)
Dept: PODIATRY | Facility: CLINIC | Age: 55
End: 2020-10-22
Payer: COMMERCIAL

## 2020-10-22 VITALS
BODY MASS INDEX: 33.37 KG/M2 | SYSTOLIC BLOOD PRESSURE: 109 MMHG | WEIGHT: 259.94 LBS | DIASTOLIC BLOOD PRESSURE: 71 MMHG | HEART RATE: 56 BPM

## 2020-10-22 DIAGNOSIS — Z98.890 POST-OPERATIVE STATE: Primary | ICD-10-CM

## 2020-10-22 PROCEDURE — 99024 POSTOP FOLLOW-UP VISIT: CPT | Mod: S$GLB,,, | Performed by: PODIATRIST

## 2020-10-22 PROCEDURE — 99999 PR PBB SHADOW E&M-EST. PATIENT-LVL III: CPT | Mod: PBBFAC,,, | Performed by: PODIATRIST

## 2020-10-22 PROCEDURE — 99999 PR PBB SHADOW E&M-EST. PATIENT-LVL III: ICD-10-PCS | Mod: PBBFAC,,, | Performed by: PODIATRIST

## 2020-10-22 PROCEDURE — 99024 PR POST-OP FOLLOW-UP VISIT: ICD-10-PCS | Mod: S$GLB,,, | Performed by: PODIATRIST

## 2020-10-29 ENCOUNTER — TELEPHONE (OUTPATIENT)
Dept: PODIATRY | Facility: CLINIC | Age: 55
End: 2020-10-29

## 2020-10-29 ENCOUNTER — TELEPHONE (OUTPATIENT)
Dept: INTERNAL MEDICINE | Facility: CLINIC | Age: 55
End: 2020-10-29

## 2020-10-29 ENCOUNTER — OFFICE VISIT (OUTPATIENT)
Dept: PODIATRY | Facility: CLINIC | Age: 55
End: 2020-10-29
Payer: COMMERCIAL

## 2020-10-29 VITALS
TEMPERATURE: 98 F | HEIGHT: 74 IN | BODY MASS INDEX: 33.24 KG/M2 | HEART RATE: 58 BPM | SYSTOLIC BLOOD PRESSURE: 130 MMHG | RESPIRATION RATE: 20 BRPM | WEIGHT: 259 LBS | DIASTOLIC BLOOD PRESSURE: 77 MMHG

## 2020-10-29 DIAGNOSIS — Z98.890 POST-OPERATIVE STATE: Primary | ICD-10-CM

## 2020-10-29 DIAGNOSIS — T81.31XD DEHISCENCE OF OPERATIVE WOUND, SUBSEQUENT ENCOUNTER: ICD-10-CM

## 2020-10-29 PROCEDURE — 99999 PR PBB SHADOW E&M-EST. PATIENT-LVL III: CPT | Mod: PBBFAC,,, | Performed by: PODIATRIST

## 2020-10-29 PROCEDURE — 99999 PR PBB SHADOW E&M-EST. PATIENT-LVL III: ICD-10-PCS | Mod: PBBFAC,,, | Performed by: PODIATRIST

## 2020-10-29 PROCEDURE — 99024 POSTOP FOLLOW-UP VISIT: CPT | Mod: S$GLB,,, | Performed by: PODIATRIST

## 2020-10-29 PROCEDURE — 99024 PR POST-OP FOLLOW-UP VISIT: ICD-10-PCS | Mod: S$GLB,,, | Performed by: PODIATRIST
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
## 2020-10-29 NOTE — TELEPHONE ENCOUNTER
Called patient to reschedule no answer, left a voice mail to call back, patient had a 11:15 am appointment with Dr. Hodges and didn't show up.

## 2020-10-29 NOTE — TELEPHONE ENCOUNTER
Left voicemail for pt to contact office to confirm if he is keeping appointment and that the location has changed to [address] the pt can be rescheduled.

## 2020-10-30 NOTE — PROGRESS NOTES
Status post left foot amputation and tenotomy procedures.  He is doing well with no complications.  Bandage is clean dry intact.  Surgeons healing well no signs of infection or dehiscence.  Continue postoperative instructions, home health and follow-up in 1 week.

## 2020-11-02 ENCOUNTER — TELEPHONE (OUTPATIENT)
Dept: PODIATRY | Facility: CLINIC | Age: 55
End: 2020-11-02

## 2020-11-02 NOTE — TELEPHONE ENCOUNTER
----- Message from Lisy Nguyễn sent at 11/2/2020  1:21 PM CST -----  Regarding: Information  Reason: Melody diaz [facility] would like a call for information on patient        Contact: [phone]

## 2020-11-02 NOTE — TELEPHONE ENCOUNTER
Spoke with Melody with [employer].  She is inquiring a relative date for pt to return to work as .  Pt had recent surgery (10/14), told her that my best estimate would be 10-12 weeks given the fact that he works as a  and would be on feet primarily and in questionable safety situations.  She states that is all the info she needs at this time

## 2020-11-05 ENCOUNTER — OFFICE VISIT (OUTPATIENT)
Dept: PODIATRY | Facility: CLINIC | Age: 55
End: 2020-11-05
Payer: COMMERCIAL

## 2020-11-05 VITALS
SYSTOLIC BLOOD PRESSURE: 149 MMHG | WEIGHT: 259.06 LBS | HEART RATE: 52 BPM | BODY MASS INDEX: 33.26 KG/M2 | DIASTOLIC BLOOD PRESSURE: 92 MMHG

## 2020-11-05 DIAGNOSIS — L97.529 ULCER OF LEFT FOOT, UNSPECIFIED ULCER STAGE: Primary | ICD-10-CM

## 2020-11-05 PROCEDURE — 99024 POSTOP FOLLOW-UP VISIT: CPT | Mod: S$GLB,,, | Performed by: PODIATRIST

## 2020-11-05 PROCEDURE — 99999 PR PBB SHADOW E&M-EST. PATIENT-LVL III: CPT | Mod: PBBFAC,,, | Performed by: PODIATRIST

## 2020-11-05 PROCEDURE — 99999 PR PBB SHADOW E&M-EST. PATIENT-LVL III: ICD-10-PCS | Mod: PBBFAC,,, | Performed by: PODIATRIST

## 2020-11-05 PROCEDURE — 99024 PR POST-OP FOLLOW-UP VISIT: ICD-10-PCS | Mod: S$GLB,,, | Performed by: PODIATRIST

## 2020-11-05 RX ORDER — CLOTRIMAZOLE AND BETAMETHASONE DIPROPIONATE 10; .64 MG/G; MG/G
CREAM TOPICAL 2 TIMES DAILY
Qty: 45 G | Refills: 2 | Status: SHIPPED | OUTPATIENT
Start: 2020-11-05 | End: 2022-06-08

## 2020-11-05 NOTE — LETTER
November 5, 2020      [id number]  [address]  [facility] [address]  Phone: [phone]       Patient: Billy Rivera   YOB: 1965  Date of Visit: 11/05/2020    To Whom It May Concern:    Sharron Rivera  was at [facility] on 11/05/2020. He may return to work/school on Monday 11/9/20 with no restrictions. If you have any questions or concerns, or if I can be of further assistance, please do not hesitate to contact me.    Sincerely,    Mingo Melara DPM

## 2020-11-11 NOTE — PROGRESS NOTES
Subjective:      Patient ID: Billy Rivera is a 55 y.o. male.    Chief Complaint: Follow-up (left foot football )    Billy is a 55 y.o. male who presents to the clinic for evaluation and treatment of high risk feet. Billy has a past medical history of Allergy, CAD (coronary artery disease), native coronary artery (6/25/2013), Cancer, COVID-19 virus detected (9/1/2020), Diabetes mellitus, Diabetes mellitus, type 2, Disorder of kidney and ureter, Heart attack (04/2012), colon cancer, stage I, Hyperlipidemia, Hypertension, Muscular pain, NSTEMI May 2013 - peak troponin 0.22 (5/22/2013), MICHAELA (obstructive sleep apnea), and Retinopathy due to secondary diabetes. The patient's chief complaint is foot ulcer, L foot s/p Partial amputation of the left 2nd toe.Flexor tenotomy of the 3rd and 5th toes L foot. Bone biopsy of the 2nd metatarsal left foot performed by Dr navarro. no issues w. [facility] .This patient has documented high risk feet requiring routine maintenance secondary to diabetes mellitis and those secondary complications of diabetes, as mentioned..  He is here for follow-up postop period    PCP: BRAD Baker MD    Date Last Seen by PCP:   Chief Complaint   Patient presents with    Follow-up     left foot football          Hemoglobin A1C   Date Value Ref Range Status   10/13/2020 10.8 (H) 4.0 - 5.6 % Final     Comment:     ADA Screening Guidelines:  5.7-6.4%  Consistent with prediabetes  >or=6.5%  Consistent with diabetes  High levels of fetal hemoglobin interfere with the HbA1C  assay. Heterozygous hemoglobin variants (HbS, HgC, etc)do  not significantly interfere with this assay.   However, presence of multiple variants may affect accuracy.     08/12/2020 11.5 (H) 4.0 - 5.6 % Final     Comment:     ADA Screening Guidelines:  5.7-6.4%  Consistent with prediabetes  >or=6.5%  Consistent with diabetes  High levels of fetal hemoglobin interfere with the HbA1C  assay. Heterozygous hemoglobin variants (HbS,  HgC, etc)do  not significantly interfere with this assay.   However, presence of multiple variants may affect accuracy.     07/03/2020 11.8 (H) 4.0 - 5.6 % Final     Comment:     ADA Screening Guidelines:  5.7-6.4%  Consistent with prediabetes  >or=6.5%  Consistent with diabetes  High levels of fetal hemoglobin interfere with the HbA1C  assay. Heterozygous hemoglobin variants (HbS, HgC, etc)do  not significantly interfere with this assay.   However, presence of multiple variants may affect accuracy.         Review of Systems   Constitution: Negative for chills, decreased appetite and fever.   Cardiovascular: Negative for leg swelling.   Skin: Positive for dry skin, nail changes and poor wound healing.   Musculoskeletal: Negative for arthritis, joint pain, joint swelling and myalgias.   Gastrointestinal: Negative for nausea and vomiting.   Neurological: Negative for loss of balance, numbness and paresthesias.           Objective:      Physical Exam  Vitals signs reviewed.   Constitutional:       Appearance: He is well-developed.   Cardiovascular:      Comments: dorsalis pedis and posterior tibial pulses are palpable bilaterally. Capillary refill time is within normal limits. + pedal hair growth         Musculoskeletal: Normal range of motion.         General: No tenderness.      Comments: Adequate joint range of motion without pain, limitation, nor crepitation Bilateral feet and ankle joints. Muscle strength is 5/5 in all groups bilaterally.      S/p L hallux amp   Skin:     General: Skin is warm and dry.      Findings: No erythema, lesion or rash.      Comments: L dorsal 2nd toe w/ sutures intact wound dehiscence noted w/ granular base covered w/ 100% fibrin No surrounding erythema, edema, malodor, nor drainage noted      Neurological:      Mental Status: He is alert and oriented to person, place, and time.      Sensory: No sensory deficit.      Comments: [place] 5.07 monofilament is intact bilateral feet.       Psychiatric:         Behavior: Behavior normal.               Assessment:       Encounter Diagnosis   Name Primary?    Ulcer of left foot, unspecified ulcer stage Yes         Plan:       Billy was seen today for follow-up.    Diagnoses and all orders for this visit:    Ulcer of left foot, unspecified ulcer stage    Other orders  -     clotrimazole-betamethasone 1-0.05% (LOTRISONE) cream; Apply topically 2 (two) times daily.      The nature of the condition, options for management, as well as potential risks and complications were discussed in detail with patient. Patient was amenable to my recommendations and left my office fully informed and will follow up as instructed or sooner if necessary.

## 2020-11-12 ENCOUNTER — OFFICE VISIT (OUTPATIENT)
Dept: PODIATRY | Facility: CLINIC | Age: 55
End: 2020-11-12
Payer: COMMERCIAL

## 2020-11-12 VITALS
BODY MASS INDEX: 33.26 KG/M2 | SYSTOLIC BLOOD PRESSURE: 118 MMHG | HEART RATE: 55 BPM | DIASTOLIC BLOOD PRESSURE: 72 MMHG | WEIGHT: 259.06 LBS

## 2020-11-12 DIAGNOSIS — Z98.890 POST-OPERATIVE STATE: Primary | ICD-10-CM

## 2020-11-12 PROCEDURE — 99024 PR POST-OP FOLLOW-UP VISIT: ICD-10-PCS | Mod: S$GLB,,, | Performed by: PODIATRIST

## 2020-11-12 PROCEDURE — 99999 PR PBB SHADOW E&M-EST. PATIENT-LVL III: CPT | Mod: PBBFAC,,, | Performed by: PODIATRIST

## 2020-11-12 PROCEDURE — 99024 POSTOP FOLLOW-UP VISIT: CPT | Mod: S$GLB,,, | Performed by: PODIATRIST

## 2020-11-12 PROCEDURE — 99999 PR PBB SHADOW E&M-EST. PATIENT-LVL III: ICD-10-PCS | Mod: PBBFAC,,, | Performed by: PODIATRIST

## 2020-11-16 LAB
FUNGUS SPEC CULT: NORMAL

## 2020-11-16 NOTE — PROGRESS NOTES
Subjective:      Patient ID: Billy Rivera is a 55 y.o. male.    Chief Complaint: Follow-up (toeball left foot )    Billy is a 55 y.o. male who presents to the clinic for evaluation and treatment of high risk feet. Billy has a past medical history of Allergy, CAD (coronary artery disease), native coronary artery (6/25/2013), Cancer, COVID-19 virus detected (9/1/2020), Diabetes mellitus, Diabetes mellitus, type 2, Disorder of kidney and ureter, Heart attack (04/2012), colon cancer, stage I, Hyperlipidemia, Hypertension, Muscular pain, NSTEMI May 2013 - peak troponin 0.22 (5/22/2013), MICHAELA (obstructive sleep apnea), and Retinopathy due to secondary diabetes. The patient's chief complaint is foot ulcer, L foot s/p Partial amputation of the left 2nd toe.Flexor tenotomy of the 3rd and 5th toes L foot. Bone biopsy of the 2nd metatarsal left foot performed by Dr navarro. no issues w. [facility] .This patient has documented high risk feet requiring routine maintenance secondary to diabetes mellitis and those secondary complications of diabetes, as mentioned..  He is here for follow-up postop visit.  No new complaints.    PCP: BRAD Baker MD    Date Last Seen by PCP:   Chief Complaint   Patient presents with    Follow-up     toeball left foot          Hemoglobin A1C   Date Value Ref Range Status   10/13/2020 10.8 (H) 4.0 - 5.6 % Final     Comment:     ADA Screening Guidelines:  5.7-6.4%  Consistent with prediabetes  >or=6.5%  Consistent with diabetes  High levels of fetal hemoglobin interfere with the HbA1C  assay. Heterozygous hemoglobin variants (HbS, HgC, etc)do  not significantly interfere with this assay.   However, presence of multiple variants may affect accuracy.     08/12/2020 11.5 (H) 4.0 - 5.6 % Final     Comment:     ADA Screening Guidelines:  5.7-6.4%  Consistent with prediabetes  >or=6.5%  Consistent with diabetes  High levels of fetal hemoglobin interfere with the HbA1C  assay. Heterozygous hemoglobin  variants (HbS, HgC, etc)do  not significantly interfere with this assay.   However, presence of multiple variants may affect accuracy.     07/03/2020 11.8 (H) 4.0 - 5.6 % Final     Comment:     ADA Screening Guidelines:  5.7-6.4%  Consistent with prediabetes  >or=6.5%  Consistent with diabetes  High levels of fetal hemoglobin interfere with the HbA1C  assay. Heterozygous hemoglobin variants (HbS, HgC, etc)do  not significantly interfere with this assay.   However, presence of multiple variants may affect accuracy.         Review of Systems   Constitution: Negative for chills, decreased appetite and fever.   Cardiovascular: Negative for leg swelling.   Skin: Positive for dry skin, nail changes and poor wound healing.   Musculoskeletal: Negative for arthritis, joint pain, joint swelling and myalgias.   Gastrointestinal: Negative for nausea and vomiting.   Neurological: Negative for loss of balance, numbness and paresthesias.           Objective:      Physical Exam  Vitals signs reviewed.   Constitutional:       Appearance: He is well-developed.   Cardiovascular:      Comments: dorsalis pedis and posterior tibial pulses are palpable bilaterally. Capillary refill time is within normal limits. + pedal hair growth         Musculoskeletal: Normal range of motion.         General: No tenderness.      Comments: Adequate joint range of motion without pain, limitation, nor crepitation Bilateral feet and ankle joints. Muscle strength is 5/5 in all groups bilaterally.      S/p L hallux amp   Skin:     General: Skin is warm and dry.      Findings: No erythema, lesion or rash.      Comments: L dorsal 2nd toe w/ sutures intact wound dehiscence noted w/ granular base covered w/ 100% fibrin No surrounding erythema, edema, malodor, nor drainage noted      Neurological:      Mental Status: He is alert and oriented to person, place, and time.      Sensory: No sensory deficit.      Comments: [place]-Roge 5.07 monofilament is intact  bilateral feet.      Psychiatric:         Behavior: Behavior normal.               Assessment:       Encounter Diagnosis   Name Primary?    Post-operative state - Left Foot Yes         Plan:       Billy was seen today for follow-up.    Diagnoses and all orders for this visit:    Post-operative state - Left Foot      Continue postoperative measures.  Continue home health.  Follow-up in 1 week to 2 weeks.  The nature of the condition, options for management, as well as potential risks and complications were discussed in detail with patient. Patient was amenable to my recommendations and left my office fully informed and will follow up as instructed or sooner if necessary.

## 2020-11-19 ENCOUNTER — DOCUMENT SCAN (OUTPATIENT)
Dept: HOME HEALTH SERVICES | Facility: HOSPITAL | Age: 55
End: 2020-11-19
Payer: COMMERCIAL

## 2020-11-19 ENCOUNTER — DOCUMENT SCAN (OUTPATIENT)
Dept: HOME HEALTH SERVICES | Facility: HOSPITAL | Age: 55
End: 2020-11-19

## 2020-11-21 ENCOUNTER — EXTERNAL HOME HEALTH (OUTPATIENT)
Dept: HOME HEALTH SERVICES | Facility: HOSPITAL | Age: 55
End: 2020-11-21
Payer: OTHER MISCELLANEOUS

## 2020-11-23 ENCOUNTER — PATIENT OUTREACH (OUTPATIENT)
Dept: ADMINISTRATIVE | Facility: OTHER | Age: 55
End: 2020-11-23

## 2020-11-25 ENCOUNTER — OFFICE VISIT (OUTPATIENT)
Dept: PODIATRY | Facility: CLINIC | Age: 55
End: 2020-11-25
Payer: COMMERCIAL

## 2020-11-25 VITALS — SYSTOLIC BLOOD PRESSURE: 150 MMHG | HEART RATE: 64 BPM | DIASTOLIC BLOOD PRESSURE: 90 MMHG

## 2020-11-25 DIAGNOSIS — T14.8XXA INFECTED WOUND: ICD-10-CM

## 2020-11-25 DIAGNOSIS — L08.9 INFECTED WOUND: ICD-10-CM

## 2020-11-25 DIAGNOSIS — E11.65 TYPE 2 DIABETES MELLITUS WITH HYPERGLYCEMIA, WITH LONG-TERM CURRENT USE OF INSULIN: ICD-10-CM

## 2020-11-25 DIAGNOSIS — Z79.4 TYPE 2 DIABETES MELLITUS WITH HYPERGLYCEMIA, WITH LONG-TERM CURRENT USE OF INSULIN: ICD-10-CM

## 2020-11-25 DIAGNOSIS — R60.0 LEG EDEMA, LEFT: ICD-10-CM

## 2020-11-25 DIAGNOSIS — L97.529 ULCER OF LEFT FOOT, UNSPECIFIED ULCER STAGE: Primary | ICD-10-CM

## 2020-11-25 PROCEDURE — 99999 PR PBB SHADOW E&M-EST. PATIENT-LVL II: ICD-10-PCS | Mod: PBBFAC,,, | Performed by: PODIATRIST

## 2020-11-25 PROCEDURE — 99212 OFFICE O/P EST SF 10 MIN: CPT | Mod: PBBFAC,25 | Performed by: PODIATRIST

## 2020-11-25 PROCEDURE — 11042 DBRDMT SUBQ TIS 1ST 20SQCM/<: CPT | Mod: PBBFAC | Performed by: PODIATRIST

## 2020-11-25 PROCEDURE — 29580 STRAPPING UNNA BOOT: CPT | Mod: PBBFAC,59,LT | Performed by: PODIATRIST

## 2020-11-25 PROCEDURE — 99213 OFFICE O/P EST LOW 20 MIN: CPT | Mod: 25,S$GLB,, | Performed by: PODIATRIST

## 2020-11-25 PROCEDURE — 11042 DBRDMT SUBQ TIS 1ST 20SQCM/<: CPT | Mod: LT,S$GLB,, | Performed by: PODIATRIST

## 2020-11-25 PROCEDURE — 99999 PR PBB SHADOW E&M-EST. PATIENT-LVL II: CPT | Mod: PBBFAC,,, | Performed by: PODIATRIST

## 2020-11-25 PROCEDURE — 11042 PR DEBRIDEMENT, SKIN, SUB-Q TISSUE,=<20 SQ CM: ICD-10-PCS | Mod: LT,S$GLB,, | Performed by: PODIATRIST

## 2020-11-25 PROCEDURE — 99213 PR OFFICE/OUTPT VISIT, EST, LEVL III, 20-29 MIN: ICD-10-PCS | Mod: 25,S$GLB,, | Performed by: PODIATRIST

## 2020-11-25 RX ORDER — CLINDAMYCIN HYDROCHLORIDE 300 MG/1
300 CAPSULE ORAL 3 TIMES DAILY
Qty: 30 CAPSULE | Refills: 0 | Status: SHIPPED | OUTPATIENT
Start: 2020-11-25 | End: 2020-12-05

## 2020-11-25 RX ORDER — METFORMIN HYDROCHLORIDE 1000 MG/1
1000 TABLET ORAL 2 TIMES DAILY WITH MEALS
Qty: 60 TABLET | Refills: 0 | Status: SHIPPED | OUTPATIENT
Start: 2020-11-25 | End: 2020-12-18 | Stop reason: SDUPTHER

## 2020-11-25 NOTE — TELEPHONE ENCOUNTER
----- Message from Francisco Power sent at 11/25/2020  2:48 PM CST -----  Requesting an RX refill or new RX.  Is this a refill or new RX: Refill  RX name and strength: metFORMIN (GLUCOPHAGE) 1000 MG tablet  Is this a 30 day or 90 day RX: 30  Pharmacy name and phone # [facility] [place], [place] - [address] [facility] [phone] (Phone) [phone] (Fax)

## 2020-12-01 ENCOUNTER — EXTERNAL HOME HEALTH (OUTPATIENT)
Dept: HOME HEALTH SERVICES | Facility: HOSPITAL | Age: 55
End: 2020-12-01

## 2020-12-02 NOTE — PROGRESS NOTES
Subjective:      Patient ID: Billy Rivera is a 55 y.o. male.    Chief Complaint: Wound Check    Billy is a 55 y.o. male who presents to the clinic for evaluation and treatment of high risk feet. Billy has a past medical history of Allergy, CAD (coronary artery disease), native coronary artery (6/25/2013), Cancer, COVID-19 virus detected (9/1/2020), Diabetes mellitus, Diabetes mellitus, type 2, Disorder of kidney and ureter, Heart attack (04/2012), colon cancer, stage I, Hyperlipidemia, Hypertension, Muscular pain, NSTEMI May 2013 - peak troponin 0.22 (5/22/2013), MICHAELA (obstructive sleep apnea), and Retinopathy due to secondary diabetes. The patient's chief complaint is foot ulcer, L foot s/p Partial amputation of the left 2nd toe.Flexor tenotomy of the 3rd and 5th toes L foot. Bone biopsy of the 2nd metatarsal left foot performed by Dr navarro. no issues w. [facility] .This patient has documented high risk feet requiring routine maintenance secondary to diabetes mellitis and those secondary complications of diabetes, as mentioned..  He is here for follow-up postop visit and wound care left foot.  No new complaints.    PCP: BRAD Baker MD    Date Last Seen by PCP:   Chief Complaint   Patient presents with    Wound Check         Hemoglobin A1C   Date Value Ref Range Status   10/13/2020 10.8 (H) 4.0 - 5.6 % Final     Comment:     ADA Screening Guidelines:  5.7-6.4%  Consistent with prediabetes  >or=6.5%  Consistent with diabetes  High levels of fetal hemoglobin interfere with the HbA1C  assay. Heterozygous hemoglobin variants (HbS, HgC, etc)do  not significantly interfere with this assay.   However, presence of multiple variants may affect accuracy.     08/12/2020 11.5 (H) 4.0 - 5.6 % Final     Comment:     ADA Screening Guidelines:  5.7-6.4%  Consistent with prediabetes  >or=6.5%  Consistent with diabetes  High levels of fetal hemoglobin interfere with the HbA1C  assay. Heterozygous hemoglobin variants (HbS,  HgC, etc)do  not significantly interfere with this assay.   However, presence of multiple variants may affect accuracy.     07/03/2020 11.8 (H) 4.0 - 5.6 % Final     Comment:     ADA Screening Guidelines:  5.7-6.4%  Consistent with prediabetes  >or=6.5%  Consistent with diabetes  High levels of fetal hemoglobin interfere with the HbA1C  assay. Heterozygous hemoglobin variants (HbS, HgC, etc)do  not significantly interfere with this assay.   However, presence of multiple variants may affect accuracy.         Review of Systems   Constitution: Negative for chills, decreased appetite and fever.   Cardiovascular: Negative for leg swelling.   Skin: Positive for dry skin, nail changes and poor wound healing.   Musculoskeletal: Negative for arthritis, joint pain, joint swelling and myalgias.   Gastrointestinal: Negative for nausea and vomiting.   Neurological: Negative for loss of balance, numbness and paresthesias.           Objective:      Physical Exam  Vitals signs reviewed.   Constitutional:       Appearance: He is well-developed.   Cardiovascular:      Comments: dorsalis pedis and posterior tibial pulses are palpable bilaterally. Capillary refill time is within normal limits. + pedal hair growth         Musculoskeletal: Normal range of motion.         General: No tenderness.      Comments: Adequate joint range of motion without pain, limitation, nor crepitation Bilateral feet and ankle joints. Muscle strength is 5/5 in all groups bilaterally.      S/p L hallux amp   Skin:     General: Skin is warm and dry.      Findings: No erythema, lesion or rash.      Comments: L dorsal 2nd toe w/ sutures intact wound dehiscence noted w/ granular base covered w/ 100% fibrin No surrounding erythema, edema, malodor, nor drainage noted, this measures 1.2 cm x 0.5 cm x 0.2 cm.     Neurological:      Mental Status: He is alert and oriented to person, place, and time.      Sensory: No sensory deficit.      Comments: [place]-Roge 5.07  "monofilament is intact bilateral feet.      Psychiatric:         Behavior: Behavior normal.               Assessment:       Encounter Diagnoses   Name Primary?    Ulcer of left foot, unspecified ulcer stage Yes    Infected wound - Left Foot     Leg edema, left          Plan:       Billy was seen today for wound check.    Diagnoses and all orders for this visit:    Ulcer of left foot, unspecified ulcer stage    Infected wound - Left Foot    Leg edema, left    Other orders  -     clindamycin (CLEOCIN) 300 MG capsule; Take 1 capsule (300 mg total) by mouth 3 (three) times daily. for 10 days    Wound Debridement    Performed by: Mingo Melara DPM   Authorized by: Patient    Time out: Immediately prior to procedure a "time out" was called to verify the correct patient, procedure, equipment, support staff and site/side marked as required.   Consent Done?:  Yes (Verbal)  Local anesthesia used?: No       Wound Details:    Location:   left foot     Type of Debridement:  Excisional       Length (cm):   1.2       Width (cm):   0.5       Depth (cm):   0.2       Percent Debrided (%):  100             Depth of debridement:  Subcutaneous tissue    Tissue debrided:  Dermis, Epidermis and Subcutaneous    Devitalized tissue debrided:  Biofilm, Callus and Necrotic/Eschar    Instruments:  Blade, Curette and Nippers     Bleeding:  Minimal  Hemostasis Achieved: Yes    Method Used:  Pressure  Patient tolerance:  Patient tolerated the procedure well with no immediate complications    With patient's permission, I applied an Unna boot dressing (bi-layered pink Coflex brand)  using a spiral technique beginning with the foam layer followed by the cohesive bandage avoiding creases or folds.  The wrap was started behind the first metatarsal and ended below the tibial tubercle of the knee.  There was overlap of each turn half the width of the previous turn in order to better control edema. Patient tolerated well and related comfort to " the left lower extremity .     The nature of the condition, options for management, as well as potential risks and complications were discussed in detail with patient. Patient was amenable to my recommendations and left my office fully informed and will follow up as instructed or sooner if necessary.      Follow-up in 1 week.

## 2020-12-09 ENCOUNTER — OFFICE VISIT (OUTPATIENT)
Dept: PODIATRY | Facility: CLINIC | Age: 55
End: 2020-12-09
Payer: COMMERCIAL

## 2020-12-09 VITALS — SYSTOLIC BLOOD PRESSURE: 126 MMHG | HEART RATE: 69 BPM | DIASTOLIC BLOOD PRESSURE: 83 MMHG

## 2020-12-09 DIAGNOSIS — Z98.890 POST-OPERATIVE STATE: ICD-10-CM

## 2020-12-09 DIAGNOSIS — L97.529 ULCER OF LEFT FOOT, UNSPECIFIED ULCER STAGE: Primary | ICD-10-CM

## 2020-12-09 PROCEDURE — 11042 PR DEBRIDEMENT, SKIN, SUB-Q TISSUE,=<20 SQ CM: ICD-10-PCS | Mod: 79,S$GLB,, | Performed by: PODIATRIST

## 2020-12-09 PROCEDURE — 99999 PR PBB SHADOW E&M-EST. PATIENT-LVL III: CPT | Mod: PBBFAC,,, | Performed by: PODIATRIST

## 2020-12-09 PROCEDURE — 11042 DBRDMT SUBQ TIS 1ST 20SQCM/<: CPT | Mod: 79,S$GLB,, | Performed by: PODIATRIST

## 2020-12-09 PROCEDURE — 99024 PR POST-OP FOLLOW-UP VISIT: ICD-10-PCS | Mod: S$GLB,,, | Performed by: PODIATRIST

## 2020-12-09 PROCEDURE — 99999 PR PBB SHADOW E&M-EST. PATIENT-LVL III: ICD-10-PCS | Mod: PBBFAC,,, | Performed by: PODIATRIST

## 2020-12-09 PROCEDURE — 99024 POSTOP FOLLOW-UP VISIT: CPT | Mod: S$GLB,,, | Performed by: PODIATRIST

## 2020-12-16 LAB
ACID FAST MOD KINY STN SPEC: NORMAL
MYCOBACTERIUM SPEC QL CULT: NORMAL

## 2020-12-17 NOTE — PROGRESS NOTES
Subjective:      Patient ID: Billy Rivera is a 55 y.o. male.    Chief Complaint: Wound Check    Billy is a 55 y.o. male who presents to the clinic for evaluation and treatment of high risk feet. Billy has a past medical history of Allergy, CAD (coronary artery disease), native coronary artery (6/25/2013), Cancer, COVID-19 virus detected (9/1/2020), Diabetes mellitus, Diabetes mellitus, type 2, Disorder of kidney and ureter, Heart attack (04/2012), colon cancer, stage I, Hyperlipidemia, Hypertension, Muscular pain, NSTEMI May 2013 - peak troponin 0.22 (5/22/2013), MICHAELA (obstructive sleep apnea), and Retinopathy due to secondary diabetes. The patient's chief complaint is foot ulcer, L foot s/p Partial amputation of the left 2nd toe.Flexor tenotomy of the 3rd and 5th toes L foot. Bone biopsy of the 2nd metatarsal left foot performed by Dr navarro. no issues w. [facility] .This patient has documented high risk feet requiring routine maintenance secondary to diabetes mellitis and those secondary complications of diabetes, as mentioned..  He is here for follow-up postop visit and wound care left foot.  He continues to wear regular shoe gear and bandages the ulceration himself daily.    PCP: BRAD Baker MD    Date Last Seen by PCP:   Chief Complaint   Patient presents with    Wound Check         Hemoglobin A1C   Date Value Ref Range Status   10/13/2020 10.8 (H) 4.0 - 5.6 % Final     Comment:     ADA Screening Guidelines:  5.7-6.4%  Consistent with prediabetes  >or=6.5%  Consistent with diabetes  High levels of fetal hemoglobin interfere with the HbA1C  assay. Heterozygous hemoglobin variants (HbS, HgC, etc)do  not significantly interfere with this assay.   However, presence of multiple variants may affect accuracy.     08/12/2020 11.5 (H) 4.0 - 5.6 % Final     Comment:     ADA Screening Guidelines:  5.7-6.4%  Consistent with prediabetes  >or=6.5%  Consistent with diabetes  High levels of fetal hemoglobin  interfere with the HbA1C  assay. Heterozygous hemoglobin variants (HbS, HgC, etc)do  not significantly interfere with this assay.   However, presence of multiple variants may affect accuracy.     07/03/2020 11.8 (H) 4.0 - 5.6 % Final     Comment:     ADA Screening Guidelines:  5.7-6.4%  Consistent with prediabetes  >or=6.5%  Consistent with diabetes  High levels of fetal hemoglobin interfere with the HbA1C  assay. Heterozygous hemoglobin variants (HbS, HgC, etc)do  not significantly interfere with this assay.   However, presence of multiple variants may affect accuracy.         Review of Systems   Constitution: Negative for chills, decreased appetite and fever.   Cardiovascular: Negative for leg swelling.   Skin: Positive for dry skin, nail changes and poor wound healing.   Musculoskeletal: Negative for arthritis, joint pain, joint swelling and myalgias.   Gastrointestinal: Negative for nausea and vomiting.   Neurological: Negative for loss of balance, numbness and paresthesias.           Objective:      Physical Exam  Vitals signs reviewed.   Constitutional:       Appearance: He is well-developed.   Cardiovascular:      Comments: dorsalis pedis and posterior tibial pulses are palpable bilaterally. Capillary refill time is within normal limits. + pedal hair growth         Musculoskeletal: Normal range of motion.         General: No tenderness.      Comments: Adequate joint range of motion without pain, limitation, nor crepitation Bilateral feet and ankle joints. Muscle strength is 5/5 in all groups bilaterally.      S/p L hallux amp   Skin:     General: Skin is warm and dry.      Findings: No erythema, lesion or rash.      Comments: L dorsal 2nd toe w/ sutures intact wound dehiscence noted w/ granular base covered w/ 100% fibrin No surrounding erythema, edema, malodor, nor drainage noted, this measures 1.0 cm x 0.4 cm x 0.2 cm.     Neurological:      Mental Status: He is alert and oriented to person, place, and  "time.      Sensory: No sensory deficit.      Comments: [place]-Roge 5.07 monofilament is intact bilateral feet.      Psychiatric:         Behavior: Behavior normal.               Assessment:       Encounter Diagnoses   Name Primary?    Ulcer of left foot, unspecified ulcer stage Yes    Post-operative state - Left Foot          Plan:       Billy was seen today for wound check.    Diagnoses and all orders for this visit:    Ulcer of left foot, unspecified ulcer stage    Post-operative state - Left Foot    Wound Debridement    Performed by: Mingo Melara DPM   Authorized by: Patient    Time out: Immediately prior to procedure a "time out" was called to verify the correct patient, procedure, equipment, support staff and site/side marked as required.   Consent Done?:  Yes (Verbal)  Local anesthesia used?: No       Wound Details:    Location:   left foot     Type of Debridement:  Excisional       Length (cm):   1.0       Width (cm):   0.4       Depth (cm):   0.2       Percent Debrided (%):  100             Depth of debridement:  Subcutaneous tissue    Tissue debrided:  Dermis, Epidermis and Subcutaneous    Devitalized tissue debrided:  Biofilm, Callus and Necrotic/Eschar    Instruments:  Blade, Curette and Nippers     Bleeding:  Minimal  Hemostasis Achieved: Yes    Method Used:  Pressure  Patient tolerance:  Patient tolerated the procedure well with no immediate complications    With patient's permission, I applied an Unna boot dressing (bi-layered pink Coflex brand)  using a spiral technique beginning with the foam layer followed by the cohesive bandage avoiding creases or folds.  The wrap was started behind the first metatarsal and ended below the tibial tubercle of the knee.  There was overlap of each turn half the width of the previous turn in order to better control edema. Patient tolerated well and related comfort to the left lower extremity .     The nature of the condition, options for management, as " well as potential risks and complications were discussed in detail with patient. Patient was amenable to my recommendations and left my office fully informed and will follow up as instructed or sooner if necessary.      Follow-up in 1 week.

## 2020-12-18 ENCOUNTER — OFFICE VISIT (OUTPATIENT)
Dept: INTERNAL MEDICINE | Facility: CLINIC | Age: 55
End: 2020-12-18
Payer: COMMERCIAL

## 2020-12-18 ENCOUNTER — TELEPHONE (OUTPATIENT)
Dept: INTERNAL MEDICINE | Facility: CLINIC | Age: 55
End: 2020-12-18

## 2020-12-18 VITALS
SYSTOLIC BLOOD PRESSURE: 122 MMHG | RESPIRATION RATE: 16 BRPM | HEART RATE: 82 BPM | OXYGEN SATURATION: 98 % | TEMPERATURE: 97 F | HEIGHT: 74 IN | WEIGHT: 254.44 LBS | DIASTOLIC BLOOD PRESSURE: 82 MMHG | BODY MASS INDEX: 32.65 KG/M2

## 2020-12-18 DIAGNOSIS — Z12.11 ENCOUNTER FOR SCREENING COLONOSCOPY: ICD-10-CM

## 2020-12-18 DIAGNOSIS — E11.65 TYPE 2 DIABETES MELLITUS WITH HYPERGLYCEMIA, WITH LONG-TERM CURRENT USE OF INSULIN: ICD-10-CM

## 2020-12-18 DIAGNOSIS — I15.2 HYPERTENSION ASSOCIATED WITH DIABETES: Primary | ICD-10-CM

## 2020-12-18 DIAGNOSIS — E11.59 HYPERTENSION ASSOCIATED WITH DIABETES: Primary | ICD-10-CM

## 2020-12-18 DIAGNOSIS — I73.9 PVD (PERIPHERAL VASCULAR DISEASE): ICD-10-CM

## 2020-12-18 DIAGNOSIS — Z85.038 HX OF COLON CANCER, STAGE I: ICD-10-CM

## 2020-12-18 DIAGNOSIS — Z79.4 TYPE 2 DIABETES MELLITUS WITH HYPERGLYCEMIA, WITH LONG-TERM CURRENT USE OF INSULIN: ICD-10-CM

## 2020-12-18 DIAGNOSIS — I25.810 CORONARY ARTERY DISEASE INVOLVING CORONARY BYPASS GRAFT OF NATIVE HEART WITHOUT ANGINA PECTORIS: ICD-10-CM

## 2020-12-18 PROCEDURE — 99214 OFFICE O/P EST MOD 30 MIN: CPT | Mod: S$GLB,,, | Performed by: INTERNAL MEDICINE

## 2020-12-18 PROCEDURE — 99999 PR PBB SHADOW E&M-EST. PATIENT-LVL IV: CPT | Mod: PBBFAC,,, | Performed by: INTERNAL MEDICINE

## 2020-12-18 PROCEDURE — 99999 PR PBB SHADOW E&M-EST. PATIENT-LVL IV: ICD-10-PCS | Mod: PBBFAC,,, | Performed by: INTERNAL MEDICINE

## 2020-12-18 PROCEDURE — 99214 PR OFFICE/OUTPT VISIT, EST, LEVL IV, 30-39 MIN: ICD-10-PCS | Mod: S$GLB,,, | Performed by: INTERNAL MEDICINE

## 2020-12-18 RX ORDER — METFORMIN HYDROCHLORIDE 1000 MG/1
1000 TABLET ORAL 2 TIMES DAILY WITH MEALS
Qty: 60 TABLET | Refills: 11 | Status: SHIPPED | OUTPATIENT
Start: 2020-12-18 | End: 2022-03-23 | Stop reason: SDUPTHER

## 2020-12-18 RX ORDER — INSULIN GLARGINE 100 [IU]/ML
55 INJECTION, SOLUTION SUBCUTANEOUS NIGHTLY
Qty: 4 BOX | Refills: 3 | Status: ON HOLD | OUTPATIENT
Start: 2020-12-18 | End: 2020-12-24 | Stop reason: SDUPTHER

## 2020-12-18 RX ORDER — CARVEDILOL 12.5 MG/1
12.5 TABLET ORAL 2 TIMES DAILY
Qty: 60 TABLET | Refills: 11 | Status: SHIPPED | OUTPATIENT
Start: 2020-12-18 | End: 2022-03-23 | Stop reason: SDUPTHER

## 2020-12-18 RX ORDER — ATORVASTATIN CALCIUM 80 MG/1
80 TABLET, FILM COATED ORAL DAILY
Qty: 30 TABLET | Refills: 11 | Status: SHIPPED | OUTPATIENT
Start: 2020-12-18 | End: 2021-04-04

## 2020-12-18 RX ORDER — LISINOPRIL 40 MG/1
40 TABLET ORAL DAILY
Qty: 30 TABLET | Refills: 11 | Status: SHIPPED | OUTPATIENT
Start: 2020-12-18 | End: 2021-04-04

## 2020-12-18 NOTE — TELEPHONE ENCOUNTER
----- Message from Magali Dhillon sent at 12/18/2020 12:52 PM CST -----  Contact: [facility]   Pharmacy is calling to clarify an RX.  RX name:  insulin regular 100 unit/mL Inj injection  What do they need to clarify:  What kind of Insulin  Comments:

## 2020-12-21 ENCOUNTER — HOSPITAL ENCOUNTER (INPATIENT)
Facility: HOSPITAL | Age: 55
LOS: 2 days | Discharge: HOME HEALTH | DRG: 478 | End: 2020-12-24
Attending: EMERGENCY MEDICINE | Admitting: EMERGENCY MEDICINE
Payer: COMMERCIAL

## 2020-12-21 ENCOUNTER — TELEPHONE (OUTPATIENT)
Dept: PODIATRY | Facility: CLINIC | Age: 55
End: 2020-12-21

## 2020-12-21 ENCOUNTER — OFFICE VISIT (OUTPATIENT)
Dept: PODIATRY | Facility: CLINIC | Age: 55
End: 2020-12-21
Payer: COMMERCIAL

## 2020-12-21 DIAGNOSIS — E11.9 DIABETES MELLITUS WITH INSULIN THERAPY: ICD-10-CM

## 2020-12-21 DIAGNOSIS — L02.612 ABSCESS OF LEFT FOOT: Primary | ICD-10-CM

## 2020-12-21 DIAGNOSIS — R07.9 CHEST PAIN: ICD-10-CM

## 2020-12-21 DIAGNOSIS — E11.628 DIABETIC FOOT INFECTION: ICD-10-CM

## 2020-12-21 DIAGNOSIS — I10 ESSENTIAL HYPERTENSION: ICD-10-CM

## 2020-12-21 DIAGNOSIS — E78.5 DYSLIPIDEMIA: ICD-10-CM

## 2020-12-21 DIAGNOSIS — E11.621 DIABETIC ULCER OF MIDFOOT ASSOCIATED WITH TYPE 2 DIABETES MELLITUS, LIMITED TO BREAKDOWN OF SKIN, UNSPECIFIED LATERALITY: ICD-10-CM

## 2020-12-21 DIAGNOSIS — M86.9 OSTEOMYELITIS OF LEFT FOOT, UNSPECIFIED TYPE: ICD-10-CM

## 2020-12-21 DIAGNOSIS — E11.51 TYPE 2 DIABETES MELLITUS WITH DIABETIC PERIPHERAL ANGIOPATHY WITHOUT GANGRENE, WITH LONG-TERM CURRENT USE OF INSULIN: ICD-10-CM

## 2020-12-21 DIAGNOSIS — E11.65 UNCONTROLLED TYPE 2 DIABETES MELLITUS WITH HYPERGLYCEMIA: ICD-10-CM

## 2020-12-21 DIAGNOSIS — M86.172 OTHER ACUTE OSTEOMYELITIS OF LEFT FOOT: ICD-10-CM

## 2020-12-21 DIAGNOSIS — Z79.4 DIABETES MELLITUS WITH INSULIN THERAPY: ICD-10-CM

## 2020-12-21 DIAGNOSIS — M86.9 OSTEOMYELITIS OF LEFT FOOT: Primary | ICD-10-CM

## 2020-12-21 DIAGNOSIS — I25.10 CORONARY ARTERY DISEASE INVOLVING NATIVE CORONARY ARTERY OF NATIVE HEART WITHOUT ANGINA PECTORIS: ICD-10-CM

## 2020-12-21 DIAGNOSIS — L08.9 DIABETIC FOOT INFECTION: ICD-10-CM

## 2020-12-21 DIAGNOSIS — L97.401 DIABETIC ULCER OF MIDFOOT ASSOCIATED WITH TYPE 2 DIABETES MELLITUS, LIMITED TO BREAKDOWN OF SKIN, UNSPECIFIED LATERALITY: ICD-10-CM

## 2020-12-21 DIAGNOSIS — L08.9 LEFT FOOT INFECTION: ICD-10-CM

## 2020-12-21 DIAGNOSIS — Z79.4 TYPE 2 DIABETES MELLITUS WITH DIABETIC PERIPHERAL ANGIOPATHY WITHOUT GANGRENE, WITH LONG-TERM CURRENT USE OF INSULIN: ICD-10-CM

## 2020-12-21 LAB
ALBUMIN SERPL BCP-MCNC: 3.1 G/DL (ref 3.5–5.2)
ALP SERPL-CCNC: 138 U/L (ref 55–135)
ALT SERPL W/O P-5'-P-CCNC: 15 U/L (ref 10–44)
ANION GAP SERPL CALC-SCNC: 13 MMOL/L (ref 8–16)
AST SERPL-CCNC: 14 U/L (ref 10–40)
BASOPHILS # BLD AUTO: 0.03 K/UL (ref 0–0.2)
BASOPHILS NFR BLD: 0.3 % (ref 0–1.9)
BILIRUB SERPL-MCNC: 0.7 MG/DL (ref 0.1–1)
BUN SERPL-MCNC: 22 MG/DL (ref 6–20)
CALCIUM SERPL-MCNC: 9.4 MG/DL (ref 8.7–10.5)
CHLORIDE SERPL-SCNC: 97 MMOL/L (ref 95–110)
CO2 SERPL-SCNC: 23 MMOL/L (ref 23–29)
CREAT SERPL-MCNC: 1.2 MG/DL (ref 0.5–1.4)
CRP SERPL-MCNC: 101.7 MG/L (ref 0–8.2)
CTP QC/QA: YES
DIFFERENTIAL METHOD: ABNORMAL
EOSINOPHIL # BLD AUTO: 0.1 K/UL (ref 0–0.5)
EOSINOPHIL NFR BLD: 0.5 % (ref 0–8)
ERYTHROCYTE [DISTWIDTH] IN BLOOD BY AUTOMATED COUNT: 13.4 % (ref 11.5–14.5)
ERYTHROCYTE [SEDIMENTATION RATE] IN BLOOD BY WESTERGREN METHOD: 84 MM/HR (ref 0–23)
EST. GFR  (AFRICAN AMERICAN): >60 ML/MIN/1.73 M^2
EST. GFR  (NON AFRICAN AMERICAN): >60 ML/MIN/1.73 M^2
GLUCOSE SERPL-MCNC: 300 MG/DL (ref 70–110)
HCT VFR BLD AUTO: 40.9 % (ref 40–54)
HGB BLD-MCNC: 13.5 G/DL (ref 14–18)
IMM GRANULOCYTES # BLD AUTO: 0.08 K/UL (ref 0–0.04)
IMM GRANULOCYTES NFR BLD AUTO: 0.8 % (ref 0–0.5)
INR PPP: 1 (ref 0.8–1.2)
LIPASE SERPL-CCNC: 94 U/L (ref 4–60)
LYMPHOCYTES # BLD AUTO: 1 K/UL (ref 1–4.8)
LYMPHOCYTES NFR BLD: 9.8 % (ref 18–48)
MCH RBC QN AUTO: 28.1 PG (ref 27–31)
MCHC RBC AUTO-ENTMCNC: 33 G/DL (ref 32–36)
MCV RBC AUTO: 85 FL (ref 82–98)
MONOCYTES # BLD AUTO: 1.1 K/UL (ref 0.3–1)
MONOCYTES NFR BLD: 10.4 % (ref 4–15)
NEUTROPHILS # BLD AUTO: 8.4 K/UL (ref 1.8–7.7)
NEUTROPHILS NFR BLD: 78.2 % (ref 38–73)
NRBC BLD-RTO: 0 /100 WBC
PLATELET # BLD AUTO: 348 K/UL (ref 150–350)
PMV BLD AUTO: 9.4 FL (ref 9.2–12.9)
POCT GLUCOSE: 244 MG/DL (ref 70–110)
POTASSIUM SERPL-SCNC: 5 MMOL/L (ref 3.5–5.1)
PROT SERPL-MCNC: 8.6 G/DL (ref 6–8.4)
PROTHROMBIN TIME: 11.3 SEC (ref 9–12.5)
RBC # BLD AUTO: 4.81 M/UL (ref 4.6–6.2)
SARS-COV-2 RDRP RESP QL NAA+PROBE: NEGATIVE
SODIUM SERPL-SCNC: 133 MMOL/L (ref 136–145)
WBC # BLD AUTO: 10.66 K/UL (ref 3.9–12.7)

## 2020-12-21 PROCEDURE — 99285 EMERGENCY DEPT VISIT HI MDM: CPT | Mod: 25

## 2020-12-21 PROCEDURE — 87147 CULTURE TYPE IMMUNOLOGIC: CPT

## 2020-12-21 PROCEDURE — 99999 PR PBB SHADOW E&M-EST. PATIENT-LVL I: CPT | Mod: PBBFAC,,, | Performed by: PODIATRIST

## 2020-12-21 PROCEDURE — 25000003 PHARM REV CODE 250: Performed by: EMERGENCY MEDICINE

## 2020-12-21 PROCEDURE — 99285 PR EMERGENCY DEPT VISIT,LEVEL V: ICD-10-PCS | Mod: ,,, | Performed by: EMERGENCY MEDICINE

## 2020-12-21 PROCEDURE — 85610 PROTHROMBIN TIME: CPT

## 2020-12-21 PROCEDURE — 96372 THER/PROPH/DIAG INJ SC/IM: CPT | Mod: 59

## 2020-12-21 PROCEDURE — U0002 COVID-19 LAB TEST NON-CDC: HCPCS | Performed by: EMERGENCY MEDICINE

## 2020-12-21 PROCEDURE — 96374 THER/PROPH/DIAG INJ IV PUSH: CPT

## 2020-12-21 PROCEDURE — G0378 HOSPITAL OBSERVATION PER HR: HCPCS

## 2020-12-21 PROCEDURE — 85652 RBC SED RATE AUTOMATED: CPT

## 2020-12-21 PROCEDURE — 87070 CULTURE OTHR SPECIMN AEROBIC: CPT

## 2020-12-21 PROCEDURE — C9399 UNCLASSIFIED DRUGS OR BIOLOG: HCPCS | Performed by: PHYSICIAN ASSISTANT

## 2020-12-21 PROCEDURE — 99024 POSTOP FOLLOW-UP VISIT: CPT | Mod: S$GLB,,, | Performed by: PODIATRIST

## 2020-12-21 PROCEDURE — 99999 PR PBB SHADOW E&M-EST. PATIENT-LVL I: ICD-10-PCS | Mod: PBBFAC,,, | Performed by: PODIATRIST

## 2020-12-21 PROCEDURE — 87075 CULTR BACTERIA EXCEPT BLOOD: CPT

## 2020-12-21 PROCEDURE — 85025 COMPLETE CBC W/AUTO DIFF WBC: CPT

## 2020-12-21 PROCEDURE — 25000003 PHARM REV CODE 250: Performed by: PHYSICIAN ASSISTANT

## 2020-12-21 PROCEDURE — 83690 ASSAY OF LIPASE: CPT

## 2020-12-21 PROCEDURE — 99285 EMERGENCY DEPT VISIT HI MDM: CPT | Mod: ,,, | Performed by: EMERGENCY MEDICINE

## 2020-12-21 PROCEDURE — 87077 CULTURE AEROBIC IDENTIFY: CPT

## 2020-12-21 PROCEDURE — 99220 PR INITIAL OBSERVATION CARE,LEVL III: CPT | Mod: ,,, | Performed by: PHYSICIAN ASSISTANT

## 2020-12-21 PROCEDURE — 86140 C-REACTIVE PROTEIN: CPT

## 2020-12-21 PROCEDURE — 80053 COMPREHEN METABOLIC PANEL: CPT

## 2020-12-21 PROCEDURE — 99024 PR POST-OP FOLLOW-UP VISIT: ICD-10-PCS | Mod: S$GLB,,, | Performed by: PODIATRIST

## 2020-12-21 PROCEDURE — 87186 SC STD MICRODIL/AGAR DIL: CPT

## 2020-12-21 PROCEDURE — 63600175 PHARM REV CODE 636 W HCPCS: Performed by: EMERGENCY MEDICINE

## 2020-12-21 PROCEDURE — 99220 PR INITIAL OBSERVATION CARE,LEVL III: ICD-10-PCS | Mod: ,,, | Performed by: PHYSICIAN ASSISTANT

## 2020-12-21 PROCEDURE — 63600175 PHARM REV CODE 636 W HCPCS: Performed by: PHYSICIAN ASSISTANT

## 2020-12-21 PROCEDURE — 87076 CULTURE ANAEROBE IDENT EACH: CPT

## 2020-12-21 RX ORDER — INSULIN ASPART 100 [IU]/ML
0-5 INJECTION, SOLUTION INTRAVENOUS; SUBCUTANEOUS
Status: DISCONTINUED | OUTPATIENT
Start: 2020-12-21 | End: 2020-12-24

## 2020-12-21 RX ORDER — IBUPROFEN 200 MG
16 TABLET ORAL
Status: DISCONTINUED | OUTPATIENT
Start: 2020-12-21 | End: 2020-12-24

## 2020-12-21 RX ORDER — CARVEDILOL 12.5 MG/1
12.5 TABLET ORAL 2 TIMES DAILY
Status: DISCONTINUED | OUTPATIENT
Start: 2020-12-22 | End: 2020-12-24 | Stop reason: HOSPADM

## 2020-12-21 RX ORDER — LISINOPRIL 20 MG/1
40 TABLET ORAL DAILY
Status: DISCONTINUED | OUTPATIENT
Start: 2020-12-22 | End: 2020-12-24 | Stop reason: HOSPADM

## 2020-12-21 RX ORDER — ATORVASTATIN CALCIUM 40 MG/1
80 TABLET, FILM COATED ORAL DAILY
Status: DISCONTINUED | OUTPATIENT
Start: 2020-12-22 | End: 2020-12-24 | Stop reason: HOSPADM

## 2020-12-21 RX ORDER — IBUPROFEN 200 MG
24 TABLET ORAL
Status: DISCONTINUED | OUTPATIENT
Start: 2020-12-21 | End: 2020-12-24

## 2020-12-21 RX ORDER — POLYETHYLENE GLYCOL 3350 17 G/17G
17 POWDER, FOR SOLUTION ORAL DAILY
Status: DISCONTINUED | OUTPATIENT
Start: 2020-12-22 | End: 2020-12-24 | Stop reason: HOSPADM

## 2020-12-21 RX ORDER — TALC
6 POWDER (GRAM) TOPICAL NIGHTLY PRN
Status: DISCONTINUED | OUTPATIENT
Start: 2020-12-21 | End: 2020-12-24 | Stop reason: HOSPADM

## 2020-12-21 RX ORDER — GLUCAGON 1 MG
1 KIT INJECTION
Status: DISCONTINUED | OUTPATIENT
Start: 2020-12-21 | End: 2020-12-24

## 2020-12-21 RX ORDER — ONDANSETRON 2 MG/ML
4 INJECTION INTRAMUSCULAR; INTRAVENOUS
Status: COMPLETED | OUTPATIENT
Start: 2020-12-21 | End: 2020-12-21

## 2020-12-21 RX ORDER — IPRATROPIUM BROMIDE AND ALBUTEROL SULFATE 2.5; .5 MG/3ML; MG/3ML
3 SOLUTION RESPIRATORY (INHALATION) EVERY 4 HOURS PRN
Status: DISCONTINUED | OUTPATIENT
Start: 2020-12-21 | End: 2020-12-24 | Stop reason: HOSPADM

## 2020-12-21 RX ORDER — ONDANSETRON 8 MG/1
8 TABLET, ORALLY DISINTEGRATING ORAL EVERY 8 HOURS PRN
Status: DISCONTINUED | OUTPATIENT
Start: 2020-12-21 | End: 2020-12-24 | Stop reason: HOSPADM

## 2020-12-21 RX ORDER — BISACODYL 10 MG
10 SUPPOSITORY, RECTAL RECTAL DAILY PRN
Status: DISCONTINUED | OUTPATIENT
Start: 2020-12-21 | End: 2020-12-24 | Stop reason: HOSPADM

## 2020-12-21 RX ORDER — ACETAMINOPHEN 325 MG/1
650 TABLET ORAL EVERY 4 HOURS PRN
Status: DISCONTINUED | OUTPATIENT
Start: 2020-12-21 | End: 2020-12-22

## 2020-12-21 RX ORDER — ASPIRIN 81 MG/1
81 TABLET ORAL DAILY
Status: DISCONTINUED | OUTPATIENT
Start: 2020-12-22 | End: 2020-12-24 | Stop reason: HOSPADM

## 2020-12-21 RX ORDER — ACETAMINOPHEN 500 MG
1000 TABLET ORAL
Status: COMPLETED | OUTPATIENT
Start: 2020-12-21 | End: 2020-12-21

## 2020-12-21 RX ORDER — INSULIN ASPART 100 [IU]/ML
9 INJECTION, SOLUTION INTRAVENOUS; SUBCUTANEOUS
Status: DISCONTINUED | OUTPATIENT
Start: 2020-12-22 | End: 2020-12-22

## 2020-12-21 RX ORDER — SODIUM CHLORIDE 0.9 % (FLUSH) 0.9 %
10 SYRINGE (ML) INJECTION
Status: DISCONTINUED | OUTPATIENT
Start: 2020-12-21 | End: 2020-12-24

## 2020-12-21 RX ADMIN — ACETAMINOPHEN 1000 MG: 500 TABLET ORAL at 08:12

## 2020-12-21 RX ADMIN — ONDANSETRON 4 MG: 2 INJECTION INTRAMUSCULAR; INTRAVENOUS at 07:12

## 2020-12-21 RX ADMIN — INSULIN ASPART 1 UNITS: 100 INJECTION, SOLUTION INTRAVENOUS; SUBCUTANEOUS at 10:12

## 2020-12-21 RX ADMIN — MELATONIN TAB 3 MG 6 MG: 3 TAB at 10:12

## 2020-12-21 RX ADMIN — INSULIN DETEMIR 28 UNITS: 100 INJECTION, SOLUTION SUBCUTANEOUS at 10:12

## 2020-12-21 NOTE — PROGRESS NOTES
Subjective:      Patient ID: Billy Rivera is a 55 y.o. male.    Chief Complaint: No chief complaint on file.    Billy is a 55 y.o. male who presents to the clinic for evaluation and treatment of high risk feet. Billy has a past medical history of Allergy, CAD (coronary artery disease), native coronary artery (6/25/2013), Cancer, COVID-19 virus detected (9/1/2020), Diabetes mellitus, Diabetes mellitus, type 2, Disorder of kidney and ureter, Heart attack (04/2012), colon cancer, stage I, Hyperlipidemia, Hypertension, Muscular pain, NSTEMI May 2013 - peak troponin 0.22 (5/22/2013), MICHAELA (obstructive sleep apnea), and Retinopathy due to secondary diabetes. The patient's chief complaint is foot ulcer, L foot s/p Partial amputation of the left 2nd toe.Flexor tenotomy of the 3rd and 5th toes L foot. Bone biopsy of the 2nd metatarsal left foot performed by Dr navarro. no issues w. [facility] .This patient has documented high risk feet requiring routine maintenance secondary to diabetes mellitis and those secondary complications of diabetes, as mentioned..  He is here for follow-up postop visit and wound care left foot.  He continues to wear regular shoe gear and bandages the ulceration himself daily.  He states that this has been draining as had an odor over the last 2-3 days.  His appointment is for this Wednesday however he came in today secondary to worsening of the wound.    PCP: BRAD Baker MD    Date Last Seen by PCP:   No chief complaint on file.        Hemoglobin A1C   Date Value Ref Range Status   10/13/2020 10.8 (H) 4.0 - 5.6 % Final     Comment:     ADA Screening Guidelines:  5.7-6.4%  Consistent with prediabetes  >or=6.5%  Consistent with diabetes  High levels of fetal hemoglobin interfere with the HbA1C  assay. Heterozygous hemoglobin variants (HbS, HgC, etc)do  not significantly interfere with this assay.   However, presence of multiple variants may affect accuracy.     08/12/2020 11.5 (H) 4.0 - 5.6 %  Final     Comment:     ADA Screening Guidelines:  5.7-6.4%  Consistent with prediabetes  >or=6.5%  Consistent with diabetes  High levels of fetal hemoglobin interfere with the HbA1C  assay. Heterozygous hemoglobin variants (HbS, HgC, etc)do  not significantly interfere with this assay.   However, presence of multiple variants may affect accuracy.     07/03/2020 11.8 (H) 4.0 - 5.6 % Final     Comment:     ADA Screening Guidelines:  5.7-6.4%  Consistent with prediabetes  >or=6.5%  Consistent with diabetes  High levels of fetal hemoglobin interfere with the HbA1C  assay. Heterozygous hemoglobin variants (HbS, HgC, etc)do  not significantly interfere with this assay.   However, presence of multiple variants may affect accuracy.         Review of Systems   Constitution: Negative for chills, decreased appetite and fever.   Cardiovascular: Negative for leg swelling.   Skin: Positive for dry skin, nail changes and poor wound healing.   Musculoskeletal: Negative for arthritis, joint pain, joint swelling and myalgias.   Gastrointestinal: Negative for nausea and vomiting.   Neurological: Negative for loss of balance, numbness and paresthesias.           Objective:      Physical Exam  Vitals signs reviewed.   Constitutional:       Appearance: He is well-developed.   Cardiovascular:      Comments: dorsalis pedis and posterior tibial pulses are palpable bilaterally. Capillary refill time is within normal limits. + pedal hair growth         Musculoskeletal: Normal range of motion.         General: No tenderness.      Comments: Adequate joint range of motion without pain, limitation, nor crepitation Bilateral feet and ankle joints. Muscle strength is 5/5 in all groups bilaterally.      S/p L hallux amp   Skin:     General: Skin is warm and dry.      Findings: No erythema, lesion or rash.      Comments: Left 1st metatarsal partial ray site noted to have ulceration with subcutaneous tissue exposed and fibrotic tissue with blowout  lesion just proximal and plantar to this both measuring approximately 2 cm in diameter with tunneling between the 2.  Serous and purulent drainage expressed from wounds with malodor noted.  Positive erythema and edema with increased warmth noted.  No discomfort apparent.  Positive neuropathy.   Neurological:      Mental Status: He is alert and oriented to person, place, and time.      Sensory: No sensory deficit.      Comments: [place]-R[place] 5.07 monofilament is intact bilateral feet.      Psychiatric:         Behavior: Behavior normal.               Assessment:       Left foot cellulitis with abscess  Type 2 diabetes mellitus with peripheral neuropathy and history of metatarsal amputation left foot  Plan:       Patient being transferred to the emergency department for admission as well as updated x-rays left foot, recommend MRI left foot, CBC/CMP and ESR/CRP, soft tissue cultures taken in the office today and submitted to microbiology.  Recommend podiatry consultation as well as Infectious Disease consultation.  Will take patient for incision and drainage 12/22/20.

## 2020-12-21 NOTE — PROGRESS NOTES
Subjective:      Patient ID: Billy Rivera is a 55 y.o. male.    Chief Complaint: No chief complaint on file.    Billy is a 55 y.o. male who presents to the clinic for evaluation and treatment of high risk feet. Billy has a past medical history of Allergy, CAD (coronary artery disease), native coronary artery (6/25/2013), Cancer, COVID-19 virus detected (9/1/2020), Diabetes mellitus, Diabetes mellitus, type 2, Disorder of kidney and ureter, Heart attack (04/2012), colon cancer, stage I, Hyperlipidemia, Hypertension, Muscular pain, NSTEMI May 2013 - peak troponin 0.22 (5/22/2013), MICHAELA (obstructive sleep apnea), and Retinopathy due to secondary diabetes. The patient's chief complaint is foot ulcer, L foot s/p Partial amputation of the left 2nd toe.Flexor tenotomy of the 3rd and 5th toes L foot. Bone biopsy of the 2nd metatarsal left foot performed by Dr navarro. no issues w. [facility] .This patient has documented high risk feet requiring routine maintenance secondary to diabetes mellitis and those secondary complications of diabetes, as mentioned..  He is here for follow-up postop visit and wound care left foot.  He continues to wear regular shoe gear and bandages the ulceration himself daily.  He states that this has been draining as had an odor over the last 2-3 days.  His appointment is for this Wednesday however he came in today secondary to worsening of the wound.    PCP: BRAD Baker MD    Date Last Seen by PCP:   No chief complaint on file.        Hemoglobin A1C   Date Value Ref Range Status   10/13/2020 10.8 (H) 4.0 - 5.6 % Final     Comment:     ADA Screening Guidelines:  5.7-6.4%  Consistent with prediabetes  >or=6.5%  Consistent with diabetes  High levels of fetal hemoglobin interfere with the HbA1C  assay. Heterozygous hemoglobin variants (HbS, HgC, etc)do  not significantly interfere with this assay.   However, presence of multiple variants may affect accuracy.     08/12/2020 11.5 (H) 4.0 - 5.6 %  Final     Comment:     ADA Screening Guidelines:  5.7-6.4%  Consistent with prediabetes  >or=6.5%  Consistent with diabetes  High levels of fetal hemoglobin interfere with the HbA1C  assay. Heterozygous hemoglobin variants (HbS, HgC, etc)do  not significantly interfere with this assay.   However, presence of multiple variants may affect accuracy.     07/03/2020 11.8 (H) 4.0 - 5.6 % Final     Comment:     ADA Screening Guidelines:  5.7-6.4%  Consistent with prediabetes  >or=6.5%  Consistent with diabetes  High levels of fetal hemoglobin interfere with the HbA1C  assay. Heterozygous hemoglobin variants (HbS, HgC, etc)do  not significantly interfere with this assay.   However, presence of multiple variants may affect accuracy.         Review of Systems   Constitution: Negative for chills, decreased appetite and fever.   Cardiovascular: Negative for leg swelling.   Skin: Positive for dry skin, nail changes and poor wound healing.   Musculoskeletal: Negative for arthritis, joint pain, joint swelling and myalgias.   Gastrointestinal: Negative for nausea and vomiting.   Neurological: Negative for loss of balance, numbness and paresthesias.           Objective:      Physical Exam  Vitals signs reviewed.   Constitutional:       Appearance: He is well-developed.   Cardiovascular:      Comments: dorsalis pedis and posterior tibial pulses are palpable bilaterally. Capillary refill time is within normal limits. + pedal hair growth         Musculoskeletal: Normal range of motion.         General: No tenderness.      Comments: Adequate joint range of motion without pain, limitation, nor crepitation Bilateral feet and ankle joints. Muscle strength is 5/5 in all groups bilaterally.      S/p L hallux amp   Skin:     General: Skin is warm and dry.      Findings: No erythema, lesion or rash.      Comments: Left 1st metatarsal partial ray site noted to have ulceration with subcutaneous tissue exposed and fibrotic tissue with blowout  lesion just proximal and plantar to this both measuring approximately 2 cm in diameter with tunneling between the 2.  Serous and purulent drainage expressed from wounds with malodor noted.  Positive erythema and edema with increased warmth noted.  No discomfort apparent.  Positive neuropathy.   Neurological:      Mental Status: He is alert and oriented to person, place, and time.      Sensory: No sensory deficit.      Comments: Cabot-Roge 5.07 monofilament is intact bilateral feet.      Psychiatric:         Behavior: Behavior normal.               Assessment:       Left foot cellulitis with abscess  Type 2 diabetes mellitus with peripheral neuropathy and history of metatarsal amputation left foot  Plan:       Patient being transferred to the emergency department for admission as well as updated x-rays left foot, recommend MRI left foot, CBC/CMP and ESR/CRP, soft tissue cultures taken in the office today and submitted to microbiology.  Recommend podiatry consultation as well as Infectious Disease consultation will take patient for incision and drainage 12/22/2020 and bone biopsy.

## 2020-12-21 NOTE — TELEPHONE ENCOUNTER
----- Message from Johanna Arnold sent at 12/21/2020  2:44 PM CST -----  Regarding: PT  Contact: PT  PT called bc he has an appointment on Wednesday but thinks he needs to be seen sooner bc his foot is draining profusely and he said it's looking worse. Please call back     Callback: [phone]

## 2020-12-21 NOTE — TELEPHONE ENCOUNTER
Patient walk in clinic needing to be seen because he is having excessive drainage and bad smell that looks like infection. Patient was seen and routed to ER per Dr. Melara

## 2020-12-22 ENCOUNTER — ANESTHESIA EVENT (OUTPATIENT)
Dept: SURGERY | Facility: HOSPITAL | Age: 55
DRG: 478 | End: 2020-12-22
Payer: COMMERCIAL

## 2020-12-22 ENCOUNTER — ANESTHESIA (OUTPATIENT)
Dept: SURGERY | Facility: HOSPITAL | Age: 55
DRG: 478 | End: 2020-12-22
Payer: COMMERCIAL

## 2020-12-22 PROBLEM — M86.9 OSTEOMYELITIS OF LEFT FOOT: Status: ACTIVE | Noted: 2020-12-21

## 2020-12-22 LAB
ANION GAP SERPL CALC-SCNC: 8 MMOL/L (ref 8–16)
BASOPHILS # BLD AUTO: 0.04 K/UL (ref 0–0.2)
BASOPHILS NFR BLD: 0.6 % (ref 0–1.9)
BUN SERPL-MCNC: 23 MG/DL (ref 6–20)
CALCIUM SERPL-MCNC: 8.3 MG/DL (ref 8.7–10.5)
CHLORIDE SERPL-SCNC: 99 MMOL/L (ref 95–110)
CO2 SERPL-SCNC: 27 MMOL/L (ref 23–29)
CREAT SERPL-MCNC: 1 MG/DL (ref 0.5–1.4)
DIFFERENTIAL METHOD: ABNORMAL
EOSINOPHIL # BLD AUTO: 0.1 K/UL (ref 0–0.5)
EOSINOPHIL NFR BLD: 1.5 % (ref 0–8)
ERYTHROCYTE [DISTWIDTH] IN BLOOD BY AUTOMATED COUNT: 13.3 % (ref 11.5–14.5)
EST. GFR  (AFRICAN AMERICAN): >60 ML/MIN/1.73 M^2
EST. GFR  (NON AFRICAN AMERICAN): >60 ML/MIN/1.73 M^2
ESTIMATED AVG GLUCOSE: 255 MG/DL (ref 68–131)
GLUCOSE SERPL-MCNC: 286 MG/DL (ref 70–110)
GRAM STN SPEC: NORMAL
HBA1C MFR BLD HPLC: 10.5 % (ref 4–5.6)
HCT VFR BLD AUTO: 34.9 % (ref 40–54)
HGB BLD-MCNC: 11.3 G/DL (ref 14–18)
IMM GRANULOCYTES # BLD AUTO: 0.03 K/UL (ref 0–0.04)
IMM GRANULOCYTES NFR BLD AUTO: 0.5 % (ref 0–0.5)
LYMPHOCYTES # BLD AUTO: 0.7 K/UL (ref 1–4.8)
LYMPHOCYTES NFR BLD: 11.5 % (ref 18–48)
MAGNESIUM SERPL-MCNC: 1.7 MG/DL (ref 1.6–2.6)
MCH RBC QN AUTO: 27.9 PG (ref 27–31)
MCHC RBC AUTO-ENTMCNC: 32.4 G/DL (ref 32–36)
MCV RBC AUTO: 86 FL (ref 82–98)
MONOCYTES # BLD AUTO: 0.9 K/UL (ref 0.3–1)
MONOCYTES NFR BLD: 15 % (ref 4–15)
NEUTROPHILS # BLD AUTO: 4.4 K/UL (ref 1.8–7.7)
NEUTROPHILS NFR BLD: 70.9 % (ref 38–73)
NRBC BLD-RTO: 0 /100 WBC
PHOSPHATE SERPL-MCNC: 3.7 MG/DL (ref 2.7–4.5)
PLATELET # BLD AUTO: 237 K/UL (ref 150–350)
PMV BLD AUTO: 9.1 FL (ref 9.2–12.9)
POCT GLUCOSE: 218 MG/DL (ref 70–110)
POCT GLUCOSE: 227 MG/DL (ref 70–110)
POCT GLUCOSE: 281 MG/DL (ref 70–110)
POCT GLUCOSE: 285 MG/DL (ref 70–110)
POCT GLUCOSE: 288 MG/DL (ref 70–110)
POCT GLUCOSE: 322 MG/DL (ref 70–110)
POTASSIUM SERPL-SCNC: 4.3 MMOL/L (ref 3.5–5.1)
RBC # BLD AUTO: 4.05 M/UL (ref 4.6–6.2)
SODIUM SERPL-SCNC: 134 MMOL/L (ref 136–145)
WBC # BLD AUTO: 6.18 K/UL (ref 3.9–12.7)

## 2020-12-22 PROCEDURE — 88311 PR  DECALCIFY TISSUE: ICD-10-PCS | Mod: 26,,, | Performed by: PATHOLOGY

## 2020-12-22 PROCEDURE — 28002 PR DEEP DISSEC FOOT INFEC,1 BURSA: ICD-10-PCS | Mod: 79,LT,, | Performed by: PODIATRIST

## 2020-12-22 PROCEDURE — 87102 FUNGUS ISOLATION CULTURE: CPT | Mod: 59

## 2020-12-22 PROCEDURE — 28002 TREATMENT OF FOOT INFECTION: CPT | Mod: 79,LT,, | Performed by: PODIATRIST

## 2020-12-22 PROCEDURE — 63600175 PHARM REV CODE 636 W HCPCS: Performed by: STUDENT IN AN ORGANIZED HEALTH CARE EDUCATION/TRAINING PROGRAM

## 2020-12-22 PROCEDURE — 25000003 PHARM REV CODE 250: Performed by: STUDENT IN AN ORGANIZED HEALTH CARE EDUCATION/TRAINING PROGRAM

## 2020-12-22 PROCEDURE — 83036 HEMOGLOBIN GLYCOSYLATED A1C: CPT

## 2020-12-22 PROCEDURE — 87206 SMEAR FLUORESCENT/ACID STAI: CPT

## 2020-12-22 PROCEDURE — 25000003 PHARM REV CODE 250: Performed by: PHYSICIAN ASSISTANT

## 2020-12-22 PROCEDURE — 63600175 PHARM REV CODE 636 W HCPCS: Performed by: PHYSICIAN ASSISTANT

## 2020-12-22 PROCEDURE — 87077 CULTURE AEROBIC IDENTIFY: CPT

## 2020-12-22 PROCEDURE — 87186 SC STD MICRODIL/AGAR DIL: CPT

## 2020-12-22 PROCEDURE — 36415 COLL VENOUS BLD VENIPUNCTURE: CPT

## 2020-12-22 PROCEDURE — 71000033 HC RECOVERY, INTIAL HOUR: Performed by: PODIATRIST

## 2020-12-22 PROCEDURE — 27201423 OPTIME MED/SURG SUP & DEVICES STERILE SUPPLY: Performed by: PODIATRIST

## 2020-12-22 PROCEDURE — 11000001 HC ACUTE MED/SURG PRIVATE ROOM

## 2020-12-22 PROCEDURE — 88311 DECALCIFY TISSUE: CPT | Mod: 59 | Performed by: PATHOLOGY

## 2020-12-22 PROCEDURE — D9220A PRA ANESTHESIA: Mod: ,,, | Performed by: STUDENT IN AN ORGANIZED HEALTH CARE EDUCATION/TRAINING PROGRAM

## 2020-12-22 PROCEDURE — 37000009 HC ANESTHESIA EA ADD 15 MINS: Performed by: PODIATRIST

## 2020-12-22 PROCEDURE — 36000705 HC OR TIME LEV I EA ADD 15 MIN: Performed by: PODIATRIST

## 2020-12-22 PROCEDURE — D9220A PRA ANESTHESIA: ICD-10-PCS | Mod: ,,, | Performed by: STUDENT IN AN ORGANIZED HEALTH CARE EDUCATION/TRAINING PROGRAM

## 2020-12-22 PROCEDURE — 87205 SMEAR GRAM STAIN: CPT | Mod: 59

## 2020-12-22 PROCEDURE — 87116 MYCOBACTERIA CULTURE: CPT | Mod: 59

## 2020-12-22 PROCEDURE — 84100 ASSAY OF PHOSPHORUS: CPT

## 2020-12-22 PROCEDURE — 99233 PR SUBSEQUENT HOSPITAL CARE,LEVL III: ICD-10-PCS | Mod: ,,, | Performed by: PHYSICIAN ASSISTANT

## 2020-12-22 PROCEDURE — 36000704 HC OR TIME LEV I 1ST 15 MIN: Performed by: PODIATRIST

## 2020-12-22 PROCEDURE — 85025 COMPLETE CBC W/AUTO DIFF WBC: CPT

## 2020-12-22 PROCEDURE — 37000008 HC ANESTHESIA 1ST 15 MINUTES: Performed by: PODIATRIST

## 2020-12-22 PROCEDURE — 87147 CULTURE TYPE IMMUNOLOGIC: CPT

## 2020-12-22 PROCEDURE — 88307 TISSUE EXAM BY PATHOLOGIST: CPT | Mod: 26,,, | Performed by: PATHOLOGY

## 2020-12-22 PROCEDURE — 80048 BASIC METABOLIC PNL TOTAL CA: CPT

## 2020-12-22 PROCEDURE — 87075 CULTR BACTERIA EXCEPT BLOOD: CPT | Mod: 59

## 2020-12-22 PROCEDURE — 87176 TISSUE HOMOGENIZATION CULTR: CPT

## 2020-12-22 PROCEDURE — 20240 BONE BIOPSY OPEN SUPERFICIAL: CPT | Mod: 79,51,, | Performed by: PODIATRIST

## 2020-12-22 PROCEDURE — 82962 GLUCOSE BLOOD TEST: CPT | Performed by: PODIATRIST

## 2020-12-22 PROCEDURE — 87070 CULTURE OTHR SPECIMN AEROBIC: CPT

## 2020-12-22 PROCEDURE — 99233 SBSQ HOSP IP/OBS HIGH 50: CPT | Mod: ,,, | Performed by: PHYSICIAN ASSISTANT

## 2020-12-22 PROCEDURE — 88307 TISSUE EXAM BY PATHOLOGIST: CPT | Performed by: PATHOLOGY

## 2020-12-22 PROCEDURE — 25000003 PHARM REV CODE 250: Performed by: PODIATRIST

## 2020-12-22 PROCEDURE — 83735 ASSAY OF MAGNESIUM: CPT

## 2020-12-22 PROCEDURE — 20240 PR BIOPSY, BONE, OPEN, EXC; SUPERFICIAL: ICD-10-PCS | Mod: 79,51,, | Performed by: PODIATRIST

## 2020-12-22 PROCEDURE — 88311 DECALCIFY TISSUE: CPT | Mod: 26,,, | Performed by: PATHOLOGY

## 2020-12-22 PROCEDURE — 88307 PR  SURG PATH,LEVEL V: ICD-10-PCS | Mod: 26,,, | Performed by: PATHOLOGY

## 2020-12-22 PROCEDURE — 97802 MEDICAL NUTRITION INDIV IN: CPT

## 2020-12-22 RX ORDER — FENTANYL CITRATE 50 UG/ML
25 INJECTION, SOLUTION INTRAMUSCULAR; INTRAVENOUS EVERY 5 MIN PRN
Status: DISCONTINUED | OUTPATIENT
Start: 2020-12-22 | End: 2020-12-22 | Stop reason: HOSPADM

## 2020-12-22 RX ORDER — MIDAZOLAM HYDROCHLORIDE 1 MG/ML
INJECTION, SOLUTION INTRAMUSCULAR; INTRAVENOUS
Status: DISCONTINUED | OUTPATIENT
Start: 2020-12-22 | End: 2020-12-23

## 2020-12-22 RX ORDER — LIDOCAINE HYDROCHLORIDE 20 MG/ML
INJECTION, SOLUTION EPIDURAL; INFILTRATION; INTRACAUDAL; PERINEURAL
Status: DISCONTINUED | OUTPATIENT
Start: 2020-12-22 | End: 2020-12-23

## 2020-12-22 RX ORDER — ONDANSETRON 2 MG/ML
INJECTION INTRAMUSCULAR; INTRAVENOUS
Status: DISCONTINUED | OUTPATIENT
Start: 2020-12-22 | End: 2020-12-23

## 2020-12-22 RX ORDER — FENTANYL CITRATE 50 UG/ML
INJECTION, SOLUTION INTRAMUSCULAR; INTRAVENOUS
Status: DISCONTINUED | OUTPATIENT
Start: 2020-12-22 | End: 2020-12-23

## 2020-12-22 RX ORDER — DIPHENHYDRAMINE HYDROCHLORIDE 50 MG/ML
12.5 INJECTION INTRAMUSCULAR; INTRAVENOUS EVERY 12 HOURS
Status: DISCONTINUED | OUTPATIENT
Start: 2020-12-22 | End: 2020-12-24

## 2020-12-22 RX ORDER — INSULIN ASPART 100 [IU]/ML
12 INJECTION, SOLUTION INTRAVENOUS; SUBCUTANEOUS
Status: DISCONTINUED | OUTPATIENT
Start: 2020-12-22 | End: 2020-12-23

## 2020-12-22 RX ORDER — PROPOFOL 10 MG/ML
VIAL (ML) INTRAVENOUS
Status: DISCONTINUED | OUTPATIENT
Start: 2020-12-22 | End: 2020-12-23

## 2020-12-22 RX ORDER — PHENYLEPHRINE HCL IN 0.9% NACL 1 MG/10 ML
SYRINGE (ML) INTRAVENOUS
Status: DISCONTINUED | OUTPATIENT
Start: 2020-12-22 | End: 2020-12-23

## 2020-12-22 RX ORDER — SODIUM CHLORIDE 0.9 % (FLUSH) 0.9 %
10 SYRINGE (ML) INJECTION
Status: DISCONTINUED | OUTPATIENT
Start: 2020-12-22 | End: 2020-12-22 | Stop reason: HOSPADM

## 2020-12-22 RX ORDER — BUPIVACAINE HYDROCHLORIDE 2.5 MG/ML
INJECTION, SOLUTION EPIDURAL; INFILTRATION; INTRACAUDAL
Status: DISCONTINUED | OUTPATIENT
Start: 2020-12-22 | End: 2020-12-22 | Stop reason: HOSPADM

## 2020-12-22 RX ORDER — PROPOFOL 10 MG/ML
VIAL (ML) INTRAVENOUS CONTINUOUS PRN
Status: DISCONTINUED | OUTPATIENT
Start: 2020-12-22 | End: 2020-12-23

## 2020-12-22 RX ORDER — LIDOCAINE HYDROCHLORIDE 10 MG/ML
INJECTION, SOLUTION EPIDURAL; INFILTRATION; INTRACAUDAL; PERINEURAL
Status: DISCONTINUED | OUTPATIENT
Start: 2020-12-22 | End: 2020-12-22 | Stop reason: HOSPADM

## 2020-12-22 RX ORDER — EPHEDRINE SULFATE 50 MG/ML
INJECTION, SOLUTION INTRAVENOUS
Status: DISCONTINUED | OUTPATIENT
Start: 2020-12-22 | End: 2020-12-23

## 2020-12-22 RX ORDER — ACETAMINOPHEN 500 MG
1000 TABLET ORAL 3 TIMES DAILY PRN
Status: DISCONTINUED | OUTPATIENT
Start: 2020-12-22 | End: 2020-12-24 | Stop reason: HOSPADM

## 2020-12-22 RX ADMIN — INSULIN DETEMIR 28 UNITS: 100 INJECTION, SOLUTION SUBCUTANEOUS at 09:12

## 2020-12-22 RX ADMIN — SODIUM CHLORIDE 1000 ML: 0.9 INJECTION, SOLUTION INTRAVENOUS at 06:12

## 2020-12-22 RX ADMIN — INSULIN ASPART 3 UNITS: 100 INJECTION, SOLUTION INTRAVENOUS; SUBCUTANEOUS at 01:12

## 2020-12-22 RX ADMIN — MIDAZOLAM 2 MG: 1 INJECTION INTRAMUSCULAR; INTRAVENOUS at 11:12

## 2020-12-22 RX ADMIN — EPHEDRINE SULFATE 15 MG: 50 INJECTION INTRAVENOUS at 12:12

## 2020-12-22 RX ADMIN — EPHEDRINE SULFATE 10 MG: 50 INJECTION INTRAVENOUS at 11:12

## 2020-12-22 RX ADMIN — LIDOCAINE HYDROCHLORIDE 100 MG: 20 INJECTION, SOLUTION EPIDURAL; INFILTRATION; INTRACAUDAL at 11:12

## 2020-12-22 RX ADMIN — Medication 100 MCG: at 12:12

## 2020-12-22 RX ADMIN — VANCOMYCIN HYDROCHLORIDE 1500 MG: 1.5 INJECTION, POWDER, LYOPHILIZED, FOR SOLUTION INTRAVENOUS at 06:12

## 2020-12-22 RX ADMIN — LISINOPRIL 40 MG: 20 TABLET ORAL at 09:12

## 2020-12-22 RX ADMIN — ATORVASTATIN CALCIUM 80 MG: 40 TABLET, FILM COATED ORAL at 09:12

## 2020-12-22 RX ADMIN — ACETAMINOPHEN 1000 MG: 500 TABLET ORAL at 09:12

## 2020-12-22 RX ADMIN — CARVEDILOL 12.5 MG: 12.5 TABLET, FILM COATED ORAL at 09:12

## 2020-12-22 RX ADMIN — ONDANSETRON 4 MG: 2 INJECTION INTRAMUSCULAR; INTRAVENOUS at 11:12

## 2020-12-22 RX ADMIN — EPHEDRINE SULFATE 5 MG: 50 INJECTION INTRAVENOUS at 11:12

## 2020-12-22 RX ADMIN — GLYCOPYRROLATE 0.2 MG: 0.2 INJECTION INTRAMUSCULAR; INTRAVENOUS at 11:12

## 2020-12-22 RX ADMIN — FENTANYL CITRATE 25 MCG: 50 INJECTION INTRAMUSCULAR; INTRAVENOUS at 11:12

## 2020-12-22 RX ADMIN — INSULIN ASPART 1 UNITS: 100 INJECTION, SOLUTION INTRAVENOUS; SUBCUTANEOUS at 09:12

## 2020-12-22 RX ADMIN — MELATONIN TAB 3 MG 6 MG: 3 TAB at 09:12

## 2020-12-22 RX ADMIN — ASPIRIN 81 MG: 81 TABLET, COATED ORAL at 09:12

## 2020-12-22 RX ADMIN — INSULIN ASPART 9 UNITS: 100 INJECTION, SOLUTION INTRAVENOUS; SUBCUTANEOUS at 09:12

## 2020-12-22 RX ADMIN — PROPOFOL 50 MG: 10 INJECTION, EMULSION INTRAVENOUS at 11:12

## 2020-12-22 RX ADMIN — VANCOMYCIN HYDROCHLORIDE 2500 MG: 1.5 INJECTION, POWDER, LYOPHILIZED, FOR SOLUTION INTRAVENOUS at 06:12

## 2020-12-22 RX ADMIN — SODIUM CHLORIDE 250 ML: 0.9 INJECTION, SOLUTION INTRAVENOUS at 12:12

## 2020-12-22 RX ADMIN — GLYCOPYRROLATE 0.2 MG: 0.2 INJECTION INTRAMUSCULAR; INTRAVENOUS at 12:12

## 2020-12-22 RX ADMIN — INSULIN ASPART 8 UNITS: 100 INJECTION, SOLUTION INTRAVENOUS; SUBCUTANEOUS at 11:12

## 2020-12-22 RX ADMIN — PROPOFOL 150 MCG/KG/MIN: 10 INJECTION, EMULSION INTRAVENOUS at 11:12

## 2020-12-22 RX ADMIN — DIPHENHYDRAMINE HYDROCHLORIDE 50 MG: 50 INJECTION, SOLUTION INTRAMUSCULAR; INTRAVENOUS at 06:12

## 2020-12-22 RX ADMIN — EPHEDRINE SULFATE 10 MG: 50 INJECTION INTRAVENOUS at 12:12

## 2020-12-22 RX ADMIN — DIPHENHYDRAMINE HYDROCHLORIDE 12.5 MG: 50 INJECTION, SOLUTION INTRAMUSCULAR; INTRAVENOUS at 06:12

## 2020-12-22 NOTE — ANESTHESIA PREPROCEDURE EVALUATION
12/22/2020  Billy Rivera is a 55 y.o., male.    Pre-operative evaluation for Procedure(s) (LRB):  INCISION AND DRAINAGE, FOOT (Left)    Billy Rivera is a 55 y.o. male w/ h/o HTN, DM2, MICHAELA, CAD s/p stent ~8y ago, COVID+ ~3m ago and poorly healing L foot/osteo s/p L toe amp going for the above procedure.     Patient Active Problem List   Diagnosis    Essential hypertension    Dyslipidemia    Coronary artery disease involving native coronary artery of native heart without angina pectoris    BDR (background diabetic retinopathy) - Both Eyes    Uncontrolled type II diabetes mellitus    PVD (peripheral vascular disease)    Sleep apnea    Proteinuria    Edema    Hypertension associated with diabetes    Type 2 diabetes mellitus with diabetic peripheral angiopathy without gangrene, with long-term current use of insulin    Onychomycosis of multiple toenails with type 2 diabetes mellitus    Type 2 diabetes mellitus 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, with long-term current use of insulin    Hyponatremia    Anemia    Hypomagnesemia    Diabetic foot ulcer associated with type 2 diabetes mellitus    Insomnia    Hx of colon cancer, stage I    Dyslipidemia associated with type 2 diabetes mellitus    Diabetes mellitus with insulin therapy    Obesity, diabetes, and hypertension syndrome    Class 2 severe obesity with body mass index (BMI) of 35 to 39.9 with serious comorbidity    Diabetes mellitus with microalbuminuria    Diabetes mellitus with peripheral circulatory disorder    Diabetic foot infection    Coronary artery disease involving coronary bypass graft of native heart without angina pectoris    Osteomyelitis of left foot       Review of patient's allergies indicates:   Allergen Reactions    Penicillins Other (See Comments)  "    PCN allergy as a child - was told he went into a coma. Tolerates Cefazolin without adverse reactions    Shellfish containing products      Other reaction(s): Unknown    Vancomycin Itching     Tolerated vancomycin 7/2020    Bactrim  [sulfamethoxazole-trimethoprim] Rash        No current facility-administered medications on file prior to encounter.      Current Outpatient Medications on File Prior to Encounter   Medication Sig Dispense Refill    acetaminophen (TYLENOL) 325 MG tablet Take 325 mg by mouth every 6 (six) hours as needed for Pain.      atorvastatin (LIPITOR) 80 MG tablet Take 1 tablet (80 mg total) by mouth once daily. 30 tablet 11    blood sugar diagnostic Strp Strips and lancets    Use before meals and bedtime 150 strip 12    carvediloL (COREG) 12.5 MG tablet Take 1 tablet (12.5 mg total) by mouth 2 (two) times daily. 60 tablet 11    ibuprofen (ADVIL,MOTRIN) 200 MG tablet Take 200 mg by mouth every 6 (six) hours as needed for Pain.      insulin (LANTUS SOLOSTAR U-100 INSULIN) glargine 100 units/mL (3mL) SubQ pen Inject 55 Units into the skin every evening. 4 Box 3    insulin regular 100 unit/mL Inj injection Inject 20 Units into the skin 3 (three) times daily before meals. 10 mL 11    lisinopriL (PRINIVIL,ZESTRIL) 40 MG tablet Take 1 tablet (40 mg total) by mouth once daily. 30 tablet 11    metFORMIN (GLUCOPHAGE) 1000 MG tablet Take 1 tablet (1,000 mg total) by mouth 2 (two) times daily with meals. 60 tablet 11    pen needle, diabetic 31 gauge x 3/16" Ndle Inject 1 each into the skin 3 (three) times daily before meals. (Patient taking differently: Inject 1 each into the skin 4 (four) times daily. ) 100 each 12    aspirin (ECOTRIN) 81 MG EC tablet Take 1 tablet (81 mg total) by mouth once daily.  0    aspirin/salicylamide/caffeine (BC HEADACHE POWDER ORAL) Take 1 Package by mouth daily as needed (pain).      clotrimazole-betamethasone 1-0.05% (LOTRISONE) cream Apply topically 2 (two) " times daily. 45 g 2    doxycycline (VIBRAMYCIN) 100 MG Cap Take 1 capsule (100 mg total) by mouth every 12 (twelve) hours. 20 capsule 0       Past Surgical History:   Procedure Laterality Date    COLONOSCOPY N/A 2/17/2017    Procedure: COLONOSCOPY;  Surgeon: Simon Gomez MD;  Location: [facility] (4TH FLR);  Service: Endoscopy;  Laterality: N/A;    CORONARY ANGIOPLASTY      INCISION AND DRAINAGE FOOT Right 6/5/2018    Procedure: INCISION AND DRAINAGE, FOOT;  Surgeon: Bridget Santana DPM;  Location: [facility] OR 1ST FLR;  Service: Podiatry;  Laterality: Right;  Request mini C arm in room. request wound vac with medium black sponge    INCISION AND DRAINAGE FOOT Right 6/7/2018    Procedure: INCISION AND DRAINAGE, FOOT;  Surgeon: Emelia Brock DPM;  Location: [facility] OR 2ND FLR;  Service: Podiatry;  Laterality: Right;    INCISION AND DRAINAGE FOOT Left 9/4/2020    Procedure: INCISION AND DRAINAGE, FOOT;  Surgeon: Ainsley Powell DPM;  Location: [facility] OR 2ND FLR;  Service: Podiatry;  Laterality: Left;    INCISION AND DRAINAGE OF ABSCESS Right 6/2/2018    Procedure: INCISION AND DRAINAGE, ABSCESS;  Surgeon: Bridget Santana DPM;  Location: [address];  Service: General;  Laterality: Right;    OSTEOTOMY OF METATARSAL BONE  9/4/2020    Procedure: OSTEOTOMY, METATARSAL BONE, 1st METATARSAL RESECTION;  Surgeon: Ainsley Powell DPM;  [facility];  Service: Podiatry;;    REMOVAL OF FOREIGN BODY FROM FOOT Right 6/7/2018    Procedure: REMOVAL, FOREIGN BODY, FOOT;  Surgeon: Emelia Brock DPM;  [facility];  Service: Podiatry;  Laterality: Right;    TOE AMPUTATION Left 7/4/2020    Procedure: AMPUTATION, TOE;  Surgeon: Bridget Santana DPM;  [facility];  Service: Podiatry;  Laterality: Left;    TOE AMPUTATION Left 10/14/2020    Procedure: AMPUTATION, TOE;  Surgeon: Mingo Melara DPM;  Location: [facility] OR 2ND FLR;  Service: Podiatry;  Laterality: Left;  stretcher OK     TONSILLECTOMY         Social History     Socioeconomic History    Marital status:      Spouse name: Not on file    Number of children: Not on file    Years of education: Not on file    Highest education level: Not on file   Occupational History    Not on file   Social Needs    Financial resource strain: Not on file    Food insecurity     Worry: Not on file     Inability: Not on file    Transportation needs     Medical: Not on file     Non-medical: Not on file   Tobacco Use    Smoking status: Never Smoker    Smokeless tobacco: Never Used   Substance and Sexual Activity    Alcohol use: Yes     Comment: rare x1 beer-     Drug use: No    Sexual activity: Not Currently   Lifestyle    Physical activity     Days per week: Not on file     Minutes per session: Not on file    Stress: Not on file   Relationships    Social connections     Talks on phone: Not on file     Gets together: Not on file     Attends Sabianist service: Not on file     Active member of club or organization: Not on file     Attends meetings of clubs or organizations: Not on file     Relationship status: Not on file   Other Topics Concern    Not on file   Social History Narrative    Not on file         Vital Signs Range (Last 24H):  Temp:  [36.2 °C (97.1 °F)-37.6 °C (99.6 °F)]   Pulse:  [61-86]   Resp:  [16-24]   BP: (114-137)/(56-74)   SpO2:  [95 %-99 %]       CBC:   Recent Labs     12/21/20 1932 12/22/20  0657   WBC 10.66 6.18   RBC 4.81 4.05*   HGB 13.5* 11.3*   HCT 40.9 34.9*    237   MCV 85 86   MCH 28.1 27.9   MCHC 33.0 32.4       CMP:   Recent Labs     12/21/20 1932 12/22/20  0657   * 134*   K 5.0 4.3   CL 97 99   CO2 23 27   BUN 22* 23*   CREATININE 1.2 1.0   * 286*   MG  --  1.7   PHOS  --  3.7   CALCIUM 9.4 8.3*   ALBUMIN 3.1*  --    PROT 8.6*  --    ALKPHOS 138*  --    ALT 15  --    AST 14  --    BILITOT 0.7  --        INR  Recent Labs     12/21/20 1932   INR 1.0           Diagnostic  Studies:      [facility] Echo (2013):  CONCLUSIONS     1 - Normal left ventricular function (EF 60%).     2 - Concentric remodeling.     3 - Normal diastolic function.     4 - Normal right ventricular function .         Anesthesia Evaluation    I have reviewed the Patient Summary Reports.    I have reviewed the Nursing Notes. I have reviewed the NPO Status.   I have reviewed the Medications.     Review of Systems  Anesthesia Hx:  No problems with previous Anesthesia  History of prior surgery of interest to airway management or planning:  Denies Personal Hx of Anesthesia complications.   Hematology/Oncology:         -- Anemia:   Cardiovascular:   Exercise tolerance: poor Hypertension Past MI CAD asymptomatic CABG/stent  PVD hyperlipidemia    Pulmonary:   Sleep Apnea    Renal/:   Chronic Renal Disease    Hepatic/GI:  Hepatic/GI Normal    Neurological:  Neurology Normal    Endocrine:   Diabetes, poorly controlled, type 2, using insulin    Psych:  Psychiatric Normal           Physical Exam  General:  Well nourished, Obesity    Airway/Jaw/Neck:  Airway Findings: Mouth Opening: Normal Tongue: Normal  General Airway Assessment: Adult  Mallampati: II  Improves to II with phonation.  TM Distance: Normal, at least 6 cm  Jaw/Neck Findings:  Neck ROM: Normal ROM      Dental:  Dental Findings: Periodontal disease, Mild, Periodontal disease, Severe    Chest/Lungs:  Chest/Lungs Findings: Clear to auscultation, Normal Respiratory Rate     Heart/Vascular:  Heart Findings: Normal    Abdomen:  Abdomen Findings: Normal    Musculoskeletal:  Musculoskeletal Findings: Normal   Skin:  Skin Findings:     Mental Status:  Mental Status Findings:  Cooperative, Alert and Oriented         Anesthesia Plan  Type of Anesthesia, risks & benefits discussed:  Anesthesia Type:  MAC, general  Patient's Preference:   Intra-op Monitoring Plan: standard ASA monitors  Intra-op Monitoring Plan Comments:   Post Op Pain Control Plan: multimodal  analgesia  Post Op Pain Control Plan Comments:   Induction:   IV  Beta Blocker:  Patient is on a Beta-Blocker and has received one dose within the past 24 hours (No further documentation required).       Informed Consent: Patient understands risks and agrees with Anesthesia plan.  Questions answered. Anesthesia consent signed with patient.  ASA Score: 3     Day of Surgery Review of History & Physical:    H&P update referred to the surgeon.         Ready For Surgery From Anesthesia Perspective.

## 2020-12-23 LAB
ANION GAP SERPL CALC-SCNC: 9 MMOL/L (ref 8–16)
BASOPHILS # BLD AUTO: 0.04 K/UL (ref 0–0.2)
BASOPHILS NFR BLD: 0.7 % (ref 0–1.9)
BUN SERPL-MCNC: 23 MG/DL (ref 6–20)
CALCIUM SERPL-MCNC: 7.9 MG/DL (ref 8.7–10.5)
CHLORIDE SERPL-SCNC: 97 MMOL/L (ref 95–110)
CO2 SERPL-SCNC: 25 MMOL/L (ref 23–29)
CREAT SERPL-MCNC: 1.1 MG/DL (ref 0.5–1.4)
DIFFERENTIAL METHOD: ABNORMAL
EOSINOPHIL # BLD AUTO: 0.2 K/UL (ref 0–0.5)
EOSINOPHIL NFR BLD: 2.5 % (ref 0–8)
ERYTHROCYTE [DISTWIDTH] IN BLOOD BY AUTOMATED COUNT: 13.5 % (ref 11.5–14.5)
EST. GFR  (AFRICAN AMERICAN): >60 ML/MIN/1.73 M^2
EST. GFR  (NON AFRICAN AMERICAN): >60 ML/MIN/1.73 M^2
GLUCOSE SERPL-MCNC: 356 MG/DL (ref 70–110)
HCT VFR BLD AUTO: 32 % (ref 40–54)
HGB BLD-MCNC: 10.2 G/DL (ref 14–18)
IMM GRANULOCYTES # BLD AUTO: 0.03 K/UL (ref 0–0.04)
IMM GRANULOCYTES NFR BLD AUTO: 0.5 % (ref 0–0.5)
LYMPHOCYTES # BLD AUTO: 1.1 K/UL (ref 1–4.8)
LYMPHOCYTES NFR BLD: 17.5 % (ref 18–48)
MAGNESIUM SERPL-MCNC: 1.5 MG/DL (ref 1.6–2.6)
MCH RBC QN AUTO: 28.3 PG (ref 27–31)
MCHC RBC AUTO-ENTMCNC: 31.9 G/DL (ref 32–36)
MCV RBC AUTO: 89 FL (ref 82–98)
MONOCYTES # BLD AUTO: 1 K/UL (ref 0.3–1)
MONOCYTES NFR BLD: 16.9 % (ref 4–15)
NEUTROPHILS # BLD AUTO: 3.8 K/UL (ref 1.8–7.7)
NEUTROPHILS NFR BLD: 61.9 % (ref 38–73)
NRBC BLD-RTO: 0 /100 WBC
PHOSPHATE SERPL-MCNC: 4 MG/DL (ref 2.7–4.5)
PLATELET # BLD AUTO: 263 K/UL (ref 150–350)
PMV BLD AUTO: 9.2 FL (ref 9.2–12.9)
POCT GLUCOSE: 216 MG/DL (ref 70–110)
POCT GLUCOSE: 254 MG/DL (ref 70–110)
POCT GLUCOSE: 292 MG/DL (ref 70–110)
POCT GLUCOSE: 296 MG/DL (ref 70–110)
POCT GLUCOSE: 345 MG/DL (ref 70–110)
POTASSIUM SERPL-SCNC: 4.4 MMOL/L (ref 3.5–5.1)
RBC # BLD AUTO: 3.61 M/UL (ref 4.6–6.2)
SODIUM SERPL-SCNC: 131 MMOL/L (ref 136–145)
WBC # BLD AUTO: 6.11 K/UL (ref 3.9–12.7)

## 2020-12-23 PROCEDURE — 99233 PR SUBSEQUENT HOSPITAL CARE,LEVL III: ICD-10-PCS | Mod: ,,, | Performed by: PHYSICIAN ASSISTANT

## 2020-12-23 PROCEDURE — 11000001 HC ACUTE MED/SURG PRIVATE ROOM

## 2020-12-23 PROCEDURE — 99223 PR INITIAL HOSPITAL CARE,LEVL III: ICD-10-PCS | Mod: ,,, | Performed by: PHYSICIAN ASSISTANT

## 2020-12-23 PROCEDURE — 83735 ASSAY OF MAGNESIUM: CPT

## 2020-12-23 PROCEDURE — 63600175 PHARM REV CODE 636 W HCPCS: Performed by: PHYSICIAN ASSISTANT

## 2020-12-23 PROCEDURE — 80048 BASIC METABOLIC PNL TOTAL CA: CPT

## 2020-12-23 PROCEDURE — 99233 SBSQ HOSP IP/OBS HIGH 50: CPT | Mod: ,,, | Performed by: PHYSICIAN ASSISTANT

## 2020-12-23 PROCEDURE — 25000003 PHARM REV CODE 250: Performed by: HOSPITALIST

## 2020-12-23 PROCEDURE — 25000003 PHARM REV CODE 250: Performed by: PHYSICIAN ASSISTANT

## 2020-12-23 PROCEDURE — 99223 1ST HOSP IP/OBS HIGH 75: CPT | Mod: ,,, | Performed by: PHYSICIAN ASSISTANT

## 2020-12-23 PROCEDURE — 36415 COLL VENOUS BLD VENIPUNCTURE: CPT

## 2020-12-23 PROCEDURE — A4216 STERILE WATER/SALINE, 10 ML: HCPCS | Performed by: HOSPITALIST

## 2020-12-23 PROCEDURE — 84100 ASSAY OF PHOSPHORUS: CPT

## 2020-12-23 PROCEDURE — 85025 COMPLETE CBC W/AUTO DIFF WBC: CPT

## 2020-12-23 RX ORDER — ENOXAPARIN SODIUM 100 MG/ML
40 INJECTION SUBCUTANEOUS EVERY 24 HOURS
Status: DISCONTINUED | OUTPATIENT
Start: 2020-12-23 | End: 2020-12-24 | Stop reason: HOSPADM

## 2020-12-23 RX ORDER — SODIUM CHLORIDE 0.9 % (FLUSH) 0.9 %
10 SYRINGE (ML) INJECTION EVERY 6 HOURS
Status: DISCONTINUED | OUTPATIENT
Start: 2020-12-23 | End: 2020-12-24 | Stop reason: HOSPADM

## 2020-12-23 RX ORDER — LANOLIN ALCOHOL/MO/W.PET/CERES
400 CREAM (GRAM) TOPICAL ONCE
Status: COMPLETED | OUTPATIENT
Start: 2020-12-23 | End: 2020-12-23

## 2020-12-23 RX ORDER — SODIUM CHLORIDE 0.9 % (FLUSH) 0.9 %
10 SYRINGE (ML) INJECTION
Status: DISCONTINUED | OUTPATIENT
Start: 2020-12-23 | End: 2020-12-24 | Stop reason: HOSPADM

## 2020-12-23 RX ORDER — CEFEPIME HYDROCHLORIDE 1 G/1
2 INJECTION, POWDER, FOR SOLUTION INTRAMUSCULAR; INTRAVENOUS
Status: DISCONTINUED | OUTPATIENT
Start: 2020-12-23 | End: 2020-12-24

## 2020-12-23 RX ORDER — INSULIN ASPART 100 [IU]/ML
16 INJECTION, SOLUTION INTRAVENOUS; SUBCUTANEOUS
Status: DISCONTINUED | OUTPATIENT
Start: 2020-12-23 | End: 2020-12-24

## 2020-12-23 RX ADMIN — INSULIN ASPART 1 UNITS: 100 INJECTION, SOLUTION INTRAVENOUS; SUBCUTANEOUS at 09:12

## 2020-12-23 RX ADMIN — DIPHENHYDRAMINE HYDROCHLORIDE 12.5 MG: 50 INJECTION, SOLUTION INTRAMUSCULAR; INTRAVENOUS at 06:12

## 2020-12-23 RX ADMIN — CARVEDILOL 12.5 MG: 12.5 TABLET, FILM COATED ORAL at 09:12

## 2020-12-23 RX ADMIN — CEFEPIME 2 G: 1 INJECTION, POWDER, FOR SOLUTION INTRAMUSCULAR; INTRAVENOUS at 11:12

## 2020-12-23 RX ADMIN — VANCOMYCIN HYDROCHLORIDE 1500 MG: 1.5 INJECTION, POWDER, LYOPHILIZED, FOR SOLUTION INTRAVENOUS at 06:12

## 2020-12-23 RX ADMIN — INSULIN ASPART 3 UNITS: 100 INJECTION, SOLUTION INTRAVENOUS; SUBCUTANEOUS at 11:12

## 2020-12-23 RX ADMIN — INSULIN ASPART 12 UNITS: 100 INJECTION, SOLUTION INTRAVENOUS; SUBCUTANEOUS at 11:12

## 2020-12-23 RX ADMIN — Medication 10 ML: at 08:12

## 2020-12-23 RX ADMIN — Medication 10 ML: at 11:12

## 2020-12-23 RX ADMIN — Medication 400 MG: at 09:12

## 2020-12-23 RX ADMIN — CEFEPIME 2 G: 1 INJECTION, POWDER, FOR SOLUTION INTRAMUSCULAR; INTRAVENOUS at 07:12

## 2020-12-23 RX ADMIN — INSULIN ASPART 2 UNITS: 100 INJECTION, SOLUTION INTRAVENOUS; SUBCUTANEOUS at 05:12

## 2020-12-23 RX ADMIN — SODIUM CHLORIDE 500 ML: 0.9 INJECTION, SOLUTION INTRAVENOUS at 04:12

## 2020-12-23 RX ADMIN — ASPIRIN 81 MG: 81 TABLET, COATED ORAL at 09:12

## 2020-12-23 RX ADMIN — ATORVASTATIN CALCIUM 80 MG: 40 TABLET, FILM COATED ORAL at 09:12

## 2020-12-23 RX ADMIN — INSULIN ASPART 12 UNITS: 100 INJECTION, SOLUTION INTRAVENOUS; SUBCUTANEOUS at 06:12

## 2020-12-23 RX ADMIN — LISINOPRIL 40 MG: 20 TABLET ORAL at 09:12

## 2020-12-23 RX ADMIN — INSULIN ASPART 16 UNITS: 100 INJECTION, SOLUTION INTRAVENOUS; SUBCUTANEOUS at 04:12

## 2020-12-23 RX ADMIN — ENOXAPARIN SODIUM 40 MG: 40 INJECTION SUBCUTANEOUS at 04:12

## 2020-12-23 NOTE — ANESTHESIA POSTPROCEDURE EVALUATION
Anesthesia Post Evaluation    Patient: Billy Rivera    Procedure(s) Performed: Procedure(s) (LRB):  INCISION AND DRAINAGE, FOOT (Left)    Final Anesthesia Type: general      Patient location during evaluation: [facility]  Patient participation: Yes- Able to Participate  Level of consciousness: awake and alert and oriented  Post-procedure vital signs: reviewed and stable  Pain management: adequate  Airway patency: patent    PONV status at discharge: No PONV  Anesthetic complications: no      Cardiovascular status: blood pressure returned to baseline and hemodynamically stable  Respiratory status: spontaneous ventilation, unassisted and room air  Hydration status: euvolemic  Follow-up not needed.          Vitals Value Taken Time   /87 12/23/20 0751   Temp 36.1 °C (96.9 °F) 12/23/20 0751   Pulse 60 12/23/20 0751   Resp 20 12/23/20 0751   SpO2 94 % 12/23/20 0751         Event Time   Out of Recovery 12/22/2020 13:25:00         Pain/Gabriel Score: Pain Rating Prior to Med Admin: 8 (12/22/2020  9:10 AM)  Gabriel Score: 9 (12/22/2020  1:45 PM)

## 2020-12-23 NOTE — TRANSFER OF CARE
"Anesthesia Transfer of Care Note    Patient: Billy Rivera    Procedure(s) Performed: Procedure(s) (LRB):  INCISION AND DRAINAGE, FOOT (Left)    Patient location: [facility]    Anesthesia Type: general    Transport from OR: Transported from OR on 6-10 L/min O2 by face mask with adequate spontaneous ventilation    Post pain: adequate analgesia    Post assessment: no apparent anesthetic complications    Post vital signs: stable    Level of consciousness: awake    Complications: none    Transfer of care protocol was followed      Last vitals:   Visit Vitals  /87 (BP Location: Right arm, Patient Position: Lying)   Pulse 60   Temp 36.1 °C (96.9 °F) (Oral)   Resp 20   Ht 5' 8" (1.727 m)   Wt 115.2 kg (254 lb)   SpO2 (!) 94%   BMI 38.62 kg/m²     "

## 2020-12-24 ENCOUNTER — PATIENT MESSAGE (OUTPATIENT)
Dept: ENDOCRINOLOGY | Facility: HOSPITAL | Age: 55
End: 2020-12-24

## 2020-12-24 VITALS
BODY MASS INDEX: 38.49 KG/M2 | RESPIRATION RATE: 18 BRPM | HEART RATE: 54 BPM | SYSTOLIC BLOOD PRESSURE: 119 MMHG | OXYGEN SATURATION: 98 % | TEMPERATURE: 98 F | HEIGHT: 68 IN | DIASTOLIC BLOOD PRESSURE: 68 MMHG | WEIGHT: 254 LBS

## 2020-12-24 LAB
ANION GAP SERPL CALC-SCNC: 7 MMOL/L (ref 8–16)
BACTERIA SPEC AEROBE CULT: ABNORMAL
BASOPHILS # BLD AUTO: 0.04 K/UL (ref 0–0.2)
BASOPHILS NFR BLD: 0.7 % (ref 0–1.9)
BUN SERPL-MCNC: 17 MG/DL (ref 6–20)
CALCIUM SERPL-MCNC: 8.3 MG/DL (ref 8.7–10.5)
CHLORIDE SERPL-SCNC: 100 MMOL/L (ref 95–110)
CO2 SERPL-SCNC: 27 MMOL/L (ref 23–29)
CREAT SERPL-MCNC: 1 MG/DL (ref 0.5–1.4)
DIFFERENTIAL METHOD: ABNORMAL
EOSINOPHIL # BLD AUTO: 0.2 K/UL (ref 0–0.5)
EOSINOPHIL NFR BLD: 3.6 % (ref 0–8)
ERYTHROCYTE [DISTWIDTH] IN BLOOD BY AUTOMATED COUNT: 13.3 % (ref 11.5–14.5)
EST. GFR  (AFRICAN AMERICAN): >60 ML/MIN/1.73 M^2
EST. GFR  (NON AFRICAN AMERICAN): >60 ML/MIN/1.73 M^2
GLUCOSE SERPL-MCNC: 353 MG/DL (ref 70–110)
HCT VFR BLD AUTO: 31.1 % (ref 40–54)
HGB BLD-MCNC: 10.1 G/DL (ref 14–18)
IMM GRANULOCYTES # BLD AUTO: 0.05 K/UL (ref 0–0.04)
IMM GRANULOCYTES NFR BLD AUTO: 0.9 % (ref 0–0.5)
LYMPHOCYTES # BLD AUTO: 1.3 K/UL (ref 1–4.8)
LYMPHOCYTES NFR BLD: 23.2 % (ref 18–48)
MAGNESIUM SERPL-MCNC: 1.7 MG/DL (ref 1.6–2.6)
MCH RBC QN AUTO: 27.6 PG (ref 27–31)
MCHC RBC AUTO-ENTMCNC: 32.5 G/DL (ref 32–36)
MCV RBC AUTO: 85 FL (ref 82–98)
MONOCYTES # BLD AUTO: 0.7 K/UL (ref 0.3–1)
MONOCYTES NFR BLD: 12.5 % (ref 4–15)
NEUTROPHILS # BLD AUTO: 3.3 K/UL (ref 1.8–7.7)
NEUTROPHILS NFR BLD: 59.1 % (ref 38–73)
NRBC BLD-RTO: 0 /100 WBC
PHOSPHATE SERPL-MCNC: 4 MG/DL (ref 2.7–4.5)
PLATELET # BLD AUTO: 244 K/UL (ref 150–350)
PMV BLD AUTO: 8.9 FL (ref 9.2–12.9)
POCT GLUCOSE: 146 MG/DL (ref 70–110)
POCT GLUCOSE: 239 MG/DL (ref 70–110)
POCT GLUCOSE: 276 MG/DL (ref 70–110)
POTASSIUM SERPL-SCNC: 4.6 MMOL/L (ref 3.5–5.1)
RBC # BLD AUTO: 3.66 M/UL (ref 4.6–6.2)
SODIUM SERPL-SCNC: 134 MMOL/L (ref 136–145)
WBC # BLD AUTO: 5.6 K/UL (ref 3.9–12.7)

## 2020-12-24 PROCEDURE — A4216 STERILE WATER/SALINE, 10 ML: HCPCS | Performed by: HOSPITALIST

## 2020-12-24 PROCEDURE — 25000003 PHARM REV CODE 250: Performed by: PHYSICIAN ASSISTANT

## 2020-12-24 PROCEDURE — 99233 SBSQ HOSP IP/OBS HIGH 50: CPT | Mod: ,,, | Performed by: PHYSICIAN ASSISTANT

## 2020-12-24 PROCEDURE — 99223 PR INITIAL HOSPITAL CARE,LEVL III: ICD-10-PCS | Mod: ,,, | Performed by: NURSE PRACTITIONER

## 2020-12-24 PROCEDURE — 63600175 PHARM REV CODE 636 W HCPCS: Performed by: PHYSICIAN ASSISTANT

## 2020-12-24 PROCEDURE — 25000003 PHARM REV CODE 250: Performed by: INTERNAL MEDICINE

## 2020-12-24 PROCEDURE — 99222 1ST HOSP IP/OBS MODERATE 55: CPT | Mod: ,,, | Performed by: NURSE PRACTITIONER

## 2020-12-24 PROCEDURE — 99223 1ST HOSP IP/OBS HIGH 75: CPT | Mod: ,,, | Performed by: NURSE PRACTITIONER

## 2020-12-24 PROCEDURE — 99222 PR INITIAL HOSPITAL CARE,LEVL II: ICD-10-PCS | Mod: ,,, | Performed by: NURSE PRACTITIONER

## 2020-12-24 PROCEDURE — 99233 PR SUBSEQUENT HOSPITAL CARE,LEVL III: ICD-10-PCS | Mod: ,,, | Performed by: PHYSICIAN ASSISTANT

## 2020-12-24 PROCEDURE — 63600175 PHARM REV CODE 636 W HCPCS: Performed by: NURSE PRACTITIONER

## 2020-12-24 PROCEDURE — 25000003 PHARM REV CODE 250: Performed by: HOSPITALIST

## 2020-12-24 PROCEDURE — 83735 ASSAY OF MAGNESIUM: CPT

## 2020-12-24 PROCEDURE — 25000003 PHARM REV CODE 250: Performed by: NURSE PRACTITIONER

## 2020-12-24 PROCEDURE — 80048 BASIC METABOLIC PNL TOTAL CA: CPT

## 2020-12-24 PROCEDURE — 85025 COMPLETE CBC W/AUTO DIFF WBC: CPT

## 2020-12-24 PROCEDURE — 84100 ASSAY OF PHOSPHORUS: CPT

## 2020-12-24 PROCEDURE — 82962 GLUCOSE BLOOD TEST: CPT

## 2020-12-24 RX ORDER — MUPIROCIN 20 MG/G
OINTMENT TOPICAL 2 TIMES DAILY
Status: DISCONTINUED | OUTPATIENT
Start: 2020-12-24 | End: 2020-12-24 | Stop reason: HOSPADM

## 2020-12-24 RX ORDER — METRONIDAZOLE 500 MG/1
500 TABLET ORAL EVERY 8 HOURS
Status: DISCONTINUED | OUTPATIENT
Start: 2020-12-24 | End: 2020-12-24

## 2020-12-24 RX ORDER — METRONIDAZOLE 500 MG/1
500 TABLET ORAL EVERY 8 HOURS
Qty: 42 TABLET | Refills: 0
Start: 2020-12-24 | End: 2020-12-24

## 2020-12-24 RX ORDER — INSULIN ASPART 100 [IU]/ML
1-10 INJECTION, SOLUTION INTRAVENOUS; SUBCUTANEOUS
Status: DISCONTINUED | OUTPATIENT
Start: 2020-12-24 | End: 2020-12-24 | Stop reason: HOSPADM

## 2020-12-24 RX ORDER — IBUPROFEN 200 MG
16 TABLET ORAL
Status: DISCONTINUED | OUTPATIENT
Start: 2020-12-24 | End: 2020-12-24 | Stop reason: HOSPADM

## 2020-12-24 RX ORDER — METRONIDAZOLE 500 MG/1
500 TABLET ORAL EVERY 8 HOURS
Status: DISCONTINUED | OUTPATIENT
Start: 2020-12-24 | End: 2020-12-24 | Stop reason: HOSPADM

## 2020-12-24 RX ORDER — MAGNESIUM SULFATE HEPTAHYDRATE 40 MG/ML
2 INJECTION, SOLUTION INTRAVENOUS ONCE
Status: COMPLETED | OUTPATIENT
Start: 2020-12-24 | End: 2020-12-24

## 2020-12-24 RX ORDER — METRONIDAZOLE 500 MG/1
500 TABLET ORAL EVERY 8 HOURS
Qty: 42 TABLET | Refills: 0 | Status: SHIPPED | OUTPATIENT
Start: 2020-12-24 | End: 2021-01-07

## 2020-12-24 RX ORDER — TALC
6 POWDER (GRAM) TOPICAL NIGHTLY PRN
Refills: 0 | COMMUNITY
Start: 2020-12-24 | End: 2022-06-08

## 2020-12-24 RX ORDER — INSULIN GLARGINE 100 [IU]/ML
60 INJECTION, SOLUTION SUBCUTANEOUS NIGHTLY
Qty: 4 BOX | Refills: 3
Start: 2020-12-24 | End: 2022-01-24 | Stop reason: SDUPTHER

## 2020-12-24 RX ORDER — METRONIDAZOLE 500 MG/1
500 TABLET ORAL EVERY 8 HOURS
Qty: 42 TABLET | Refills: 0 | Status: SHIPPED | OUTPATIENT
Start: 2020-12-24 | End: 2020-12-24

## 2020-12-24 RX ORDER — INSULIN ASPART 100 [IU]/ML
24 INJECTION, SOLUTION INTRAVENOUS; SUBCUTANEOUS
Status: DISCONTINUED | OUTPATIENT
Start: 2020-12-24 | End: 2020-12-24 | Stop reason: HOSPADM

## 2020-12-24 RX ORDER — IBUPROFEN 200 MG
24 TABLET ORAL
Status: DISCONTINUED | OUTPATIENT
Start: 2020-12-24 | End: 2020-12-24 | Stop reason: HOSPADM

## 2020-12-24 RX ORDER — INSULIN ASPART 100 [IU]/ML
20 INJECTION, SOLUTION INTRAVENOUS; SUBCUTANEOUS
Status: DISCONTINUED | OUTPATIENT
Start: 2020-12-24 | End: 2020-12-24

## 2020-12-24 RX ORDER — GLUCAGON 1 MG
1 KIT INJECTION
Status: DISCONTINUED | OUTPATIENT
Start: 2020-12-24 | End: 2020-12-24 | Stop reason: HOSPADM

## 2020-12-24 RX ADMIN — ATORVASTATIN CALCIUM 80 MG: 40 TABLET, FILM COATED ORAL at 08:12

## 2020-12-24 RX ADMIN — INSULIN ASPART 16 UNITS: 100 INJECTION, SOLUTION INTRAVENOUS; SUBCUTANEOUS at 08:12

## 2020-12-24 RX ADMIN — MAGNESIUM SULFATE 2 G: 2 INJECTION INTRAVENOUS at 09:12

## 2020-12-24 RX ADMIN — INSULIN ASPART 20 UNITS: 100 INJECTION, SOLUTION INTRAVENOUS; SUBCUTANEOUS at 12:12

## 2020-12-24 RX ADMIN — INSULIN ASPART 3 UNITS: 100 INJECTION, SOLUTION INTRAVENOUS; SUBCUTANEOUS at 12:12

## 2020-12-24 RX ADMIN — CEFEPIME 2 G: 1 INJECTION, POWDER, FOR SOLUTION INTRAMUSCULAR; INTRAVENOUS at 04:12

## 2020-12-24 RX ADMIN — INSULIN ASPART 24 UNITS: 100 INJECTION, SOLUTION INTRAVENOUS; SUBCUTANEOUS at 05:12

## 2020-12-24 RX ADMIN — CARVEDILOL 12.5 MG: 12.5 TABLET, FILM COATED ORAL at 08:12

## 2020-12-24 RX ADMIN — Medication 10 ML: at 05:12

## 2020-12-24 RX ADMIN — ENOXAPARIN SODIUM 40 MG: 40 INJECTION SUBCUTANEOUS at 05:12

## 2020-12-24 RX ADMIN — MUPIROCIN: 20 OINTMENT TOPICAL at 09:12

## 2020-12-24 RX ADMIN — INSULIN ASPART 3 UNITS: 100 INJECTION, SOLUTION INTRAVENOUS; SUBCUTANEOUS at 08:12

## 2020-12-24 RX ADMIN — ASPIRIN 81 MG: 81 TABLET, COATED ORAL at 08:12

## 2020-12-24 RX ADMIN — CEFTRIAXONE SODIUM 2 G: 2 INJECTION, POWDER, FOR SOLUTION INTRAMUSCULAR; INTRAVENOUS at 12:12

## 2020-12-24 RX ADMIN — LISINOPRIL 40 MG: 20 TABLET ORAL at 08:12

## 2020-12-24 RX ADMIN — METRONIDAZOLE 500 MG: 500 TABLET ORAL at 03:12

## 2020-12-26 PROCEDURE — G0180 MD CERTIFICATION HHA PATIENT: HCPCS | Mod: ,,, | Performed by: HOSPITALIST

## 2020-12-26 PROCEDURE — G0180 PR HOME HEALTH MD CERTIFICATION: ICD-10-PCS | Mod: ,,, | Performed by: HOSPITALIST

## 2020-12-28 ENCOUNTER — TELEPHONE (OUTPATIENT)
Dept: PODIATRY | Facility: CLINIC | Age: 55
End: 2020-12-28

## 2020-12-28 ENCOUNTER — PATIENT OUTREACH (OUTPATIENT)
Dept: ADMINISTRATIVE | Facility: CLINIC | Age: 55
End: 2020-12-28

## 2020-12-28 LAB
BACTERIA SPEC ANAEROBE CULT: ABNORMAL
BACTERIA SPEC ANAEROBE CULT: NORMAL
BACTERIA SPEC ANAEROBE CULT: NORMAL

## 2020-12-28 NOTE — PATIENT INSTRUCTIONS
Discharge Instructions for Osteomyelitis  You have a condition called osteomyelitis. This is a bone infection caused by bacteria or fungi. The infection may have come from one area of your body and spread to the bone by traveling through the blood. Osteomyelitis is described as acute when the infection is new, and chronic when you have had the infection for a longer time. If you have any questions or concerns, call your healthcare provider.  Home care  · Take your medicine exactly as directed. If you were given antibiotics or antifungal medicine, make sure you finish the prescription--even if you feel better. If you dont finish the medicine, the infection may return and may make future infections harder to treat.  · Be careful not to injure the area where you have the infection.  · Carefully follow all instructions for taking care of any wounds.  · Use a splint, sling, or brace as directed by your healthcare provider.  Follow-up care  Make a follow-up appointment as directed by our staff.  When to seek medical care  Call your healthcare provider if you have any of the following:  · Increasing pain, redness, swelling, or drainage in the infected area  · Fever 100.4°F (38°C) or greater, or as advised  · Increasing fatigue or feeling tired   Date Last Reviewed: 12/1/2016  © 7536-4385 [employer]. [address] [place] [address], [place] [address]. All rights reserved. This information is not intended as a substitute for professional medical care. Always follow your healthcare professional's instructions.

## 2020-12-28 NOTE — TELEPHONE ENCOUNTER
Schedule patient for upcoming appointment informed patient that appointment will show in portal within a few days. ----- Message from Josiah Saunders MD sent at 12/25/2020  8:10 PM CST -----  Regarding: Followup  Please schedule followup with Dr Melara within 10 days of discharge (10/25), thanks

## 2020-12-29 LAB
FINAL PATHOLOGIC DIAGNOSIS: NORMAL
Lab: NORMAL

## 2020-12-30 ENCOUNTER — LAB VISIT (OUTPATIENT)
Dept: LAB | Facility: HOSPITAL | Age: 55
End: 2020-12-30
Attending: INTERNAL MEDICINE
Payer: COMMERCIAL

## 2020-12-30 DIAGNOSIS — M86.9 INFLAMMATION OF BONE: Primary | ICD-10-CM

## 2020-12-30 LAB
ALBUMIN SERPL BCP-MCNC: 3 G/DL (ref 3.5–5.2)
ALP SERPL-CCNC: 136 U/L (ref 55–135)
ALT SERPL W/O P-5'-P-CCNC: 41 U/L (ref 10–44)
ANION GAP SERPL CALC-SCNC: 14 MMOL/L (ref 8–16)
AST SERPL-CCNC: 22 U/L (ref 10–40)
BASOPHILS # BLD AUTO: 0.08 K/UL (ref 0–0.2)
BASOPHILS NFR BLD: 0.8 % (ref 0–1.9)
BILIRUB SERPL-MCNC: 0.3 MG/DL (ref 0.1–1)
BUN SERPL-MCNC: 20 MG/DL (ref 6–20)
CALCIUM SERPL-MCNC: 8.6 MG/DL (ref 8.7–10.5)
CHLORIDE SERPL-SCNC: 98 MMOL/L (ref 95–110)
CO2 SERPL-SCNC: 22 MMOL/L (ref 23–29)
CREAT SERPL-MCNC: 1 MG/DL (ref 0.5–1.4)
CRP SERPL-MCNC: 7.6 MG/L (ref 0–8.2)
DIFFERENTIAL METHOD: ABNORMAL
EOSINOPHIL # BLD AUTO: 0.2 K/UL (ref 0–0.5)
EOSINOPHIL NFR BLD: 2.5 % (ref 0–8)
ERYTHROCYTE [DISTWIDTH] IN BLOOD BY AUTOMATED COUNT: 13.7 % (ref 11.5–14.5)
ERYTHROCYTE [SEDIMENTATION RATE] IN BLOOD BY WESTERGREN METHOD: 58 MM/HR (ref 0–23)
EST. GFR  (AFRICAN AMERICAN): >60 ML/MIN/1.73 M^2
EST. GFR  (NON AFRICAN AMERICAN): >60 ML/MIN/1.73 M^2
GLUCOSE SERPL-MCNC: 291 MG/DL (ref 70–110)
HCT VFR BLD AUTO: 36.7 % (ref 40–54)
HGB BLD-MCNC: 12 G/DL (ref 14–18)
IMM GRANULOCYTES # BLD AUTO: 0.17 K/UL (ref 0–0.04)
IMM GRANULOCYTES NFR BLD AUTO: 1.7 % (ref 0–0.5)
LYMPHOCYTES # BLD AUTO: 1.6 K/UL (ref 1–4.8)
LYMPHOCYTES NFR BLD: 16.7 % (ref 18–48)
MCH RBC QN AUTO: 28.6 PG (ref 27–31)
MCHC RBC AUTO-ENTMCNC: 32.7 G/DL (ref 32–36)
MCV RBC AUTO: 87 FL (ref 82–98)
MONOCYTES # BLD AUTO: 0.9 K/UL (ref 0.3–1)
MONOCYTES NFR BLD: 9.5 % (ref 4–15)
NEUTROPHILS # BLD AUTO: 6.7 K/UL (ref 1.8–7.7)
NEUTROPHILS NFR BLD: 68.8 % (ref 38–73)
NRBC BLD-RTO: 0 /100 WBC
PLATELET # BLD AUTO: 386 K/UL (ref 150–350)
PMV BLD AUTO: 9.7 FL (ref 9.2–12.9)
POTASSIUM SERPL-SCNC: 3.6 MMOL/L (ref 3.5–5.1)
PROT SERPL-MCNC: 7.4 G/DL (ref 6–8.4)
RBC # BLD AUTO: 4.2 M/UL (ref 4.6–6.2)
SODIUM SERPL-SCNC: 134 MMOL/L (ref 136–145)
WBC # BLD AUTO: 9.78 K/UL (ref 3.9–12.7)

## 2020-12-30 PROCEDURE — 85025 COMPLETE CBC W/AUTO DIFF WBC: CPT

## 2020-12-30 PROCEDURE — 85652 RBC SED RATE AUTOMATED: CPT

## 2020-12-30 PROCEDURE — 86140 C-REACTIVE PROTEIN: CPT

## 2020-12-30 PROCEDURE — 80053 COMPREHEN METABOLIC PANEL: CPT

## 2020-12-31 ENCOUNTER — OFFICE VISIT (OUTPATIENT)
Dept: INTERNAL MEDICINE | Facility: CLINIC | Age: 55
End: 2020-12-31
Payer: COMMERCIAL

## 2020-12-31 VITALS
RESPIRATION RATE: 16 BRPM | TEMPERATURE: 98 F | WEIGHT: 242.5 LBS | HEART RATE: 68 BPM | OXYGEN SATURATION: 99 % | DIASTOLIC BLOOD PRESSURE: 80 MMHG | HEIGHT: 74 IN | SYSTOLIC BLOOD PRESSURE: 130 MMHG | BODY MASS INDEX: 31.12 KG/M2

## 2020-12-31 DIAGNOSIS — E11.59 HYPERTENSION ASSOCIATED WITH DIABETES: ICD-10-CM

## 2020-12-31 DIAGNOSIS — E11.3393 TYPE 2 DIABETES MELLITUS WITH BOTH EYES AFFECTED BY MODERATE NONPROLIFERATIVE RETINOPATHY WITHOUT MACULAR EDEMA, WITH LONG-TERM CURRENT USE OF INSULIN: Primary | ICD-10-CM

## 2020-12-31 DIAGNOSIS — M86.9 OSTEOMYELITIS OF LEFT FOOT, UNSPECIFIED TYPE: ICD-10-CM

## 2020-12-31 DIAGNOSIS — I15.2 HYPERTENSION ASSOCIATED WITH DIABETES: ICD-10-CM

## 2020-12-31 DIAGNOSIS — E66.01 CLASS 2 SEVERE OBESITY WITH BODY MASS INDEX (BMI) OF 35 TO 39.9 WITH SERIOUS COMORBIDITY: ICD-10-CM

## 2020-12-31 DIAGNOSIS — E11.69 DYSLIPIDEMIA ASSOCIATED WITH TYPE 2 DIABETES MELLITUS: ICD-10-CM

## 2020-12-31 DIAGNOSIS — Z79.4 TYPE 2 DIABETES MELLITUS WITH BOTH EYES AFFECTED BY MODERATE NONPROLIFERATIVE RETINOPATHY WITHOUT MACULAR EDEMA, WITH LONG-TERM CURRENT USE OF INSULIN: Primary | ICD-10-CM

## 2020-12-31 DIAGNOSIS — I15.2 OBESITY, DIABETES, AND HYPERTENSION SYNDROME: ICD-10-CM

## 2020-12-31 DIAGNOSIS — E78.5 DYSLIPIDEMIA ASSOCIATED WITH TYPE 2 DIABETES MELLITUS: ICD-10-CM

## 2020-12-31 DIAGNOSIS — I25.10 CORONARY ARTERY DISEASE INVOLVING NATIVE CORONARY ARTERY OF NATIVE HEART WITHOUT ANGINA PECTORIS: ICD-10-CM

## 2020-12-31 DIAGNOSIS — E66.9 OBESITY, DIABETES, AND HYPERTENSION SYNDROME: ICD-10-CM

## 2020-12-31 DIAGNOSIS — E11.621 DIABETIC ULCER OF MIDFOOT ASSOCIATED WITH TYPE 2 DIABETES MELLITUS, LIMITED TO BREAKDOWN OF SKIN, UNSPECIFIED LATERALITY: ICD-10-CM

## 2020-12-31 DIAGNOSIS — E11.59 OBESITY, DIABETES, AND HYPERTENSION SYNDROME: ICD-10-CM

## 2020-12-31 DIAGNOSIS — E11.69 OBESITY, DIABETES, AND HYPERTENSION SYNDROME: ICD-10-CM

## 2020-12-31 DIAGNOSIS — L97.401 DIABETIC ULCER OF MIDFOOT ASSOCIATED WITH TYPE 2 DIABETES MELLITUS, LIMITED TO BREAKDOWN OF SKIN, UNSPECIFIED LATERALITY: ICD-10-CM

## 2020-12-31 PROCEDURE — 99999 PR PBB SHADOW E&M-EST. PATIENT-LVL V: ICD-10-PCS | Mod: PBBFAC,,, | Performed by: NURSE PRACTITIONER

## 2020-12-31 PROCEDURE — 99215 OFFICE O/P EST HI 40 MIN: CPT | Mod: S$GLB,,, | Performed by: NURSE PRACTITIONER

## 2020-12-31 PROCEDURE — 99999 PR PBB SHADOW E&M-EST. PATIENT-LVL V: CPT | Mod: PBBFAC,,, | Performed by: NURSE PRACTITIONER

## 2020-12-31 PROCEDURE — 99215 PR OFFICE/OUTPT VISIT, EST, LEVL V, 40-54 MIN: ICD-10-PCS | Mod: S$GLB,,, | Performed by: NURSE PRACTITIONER

## 2020-12-31 RX ORDER — SUB-Q INSULIN DEVICE, 40 UNIT
1 EACH MISCELLANEOUS DAILY
Qty: 30 EACH | Refills: 6 | Status: SHIPPED | OUTPATIENT
Start: 2020-12-31 | End: 2020-12-31 | Stop reason: ALTCHOICE

## 2020-12-31 RX ORDER — EMPAGLIFLOZIN 25 MG/1
25 TABLET, FILM COATED ORAL DAILY
Qty: 30 TABLET | Refills: 3 | Status: SHIPPED | OUTPATIENT
Start: 2020-12-31 | End: 2022-02-09

## 2020-12-31 RX ORDER — BLOOD-GLUCOSE TRANSMITTER
1 EACH MISCELLANEOUS DAILY
Qty: 1 EACH | Refills: 3 | Status: SHIPPED | OUTPATIENT
Start: 2020-12-31 | End: 2020-12-31 | Stop reason: ALTCHOICE

## 2020-12-31 RX ORDER — INSULIN ASPART 100 [IU]/ML
70 INJECTION, SOLUTION INTRAVENOUS; SUBCUTANEOUS CONTINUOUS
Qty: 40 ML | Refills: 3 | Status: SHIPPED | OUTPATIENT
Start: 2020-12-31 | End: 2022-01-20 | Stop reason: SDUPTHER

## 2020-12-31 RX ORDER — SUBCUTANEOUS INSULIN PUMP
1 EACH MISCELLANEOUS CONTINUOUS
Qty: 1 EACH | Refills: 0 | Status: SHIPPED | OUTPATIENT
Start: 2020-12-31 | End: 2021-01-08 | Stop reason: SDUPTHER

## 2020-12-31 RX ORDER — BLOOD-GLUCOSE SENSOR
1 EACH MISCELLANEOUS
Qty: 3 EACH | Refills: 12 | Status: SHIPPED | OUTPATIENT
Start: 2020-12-31 | End: 2020-12-31 | Stop reason: ALTCHOICE

## 2020-12-31 NOTE — PATIENT INSTRUCTIONS
Continue metformin 1000 twice per day.   Start jardiance 25mg tablet daily.   Stay hydrated, eat small frequent meals. Follow ADA diet.   Continue on multi dose injections for now -   Will transition you to an insulin pump -   I have sent a prescription for novolog insulin vials to the pharmacy and the [facility] will more than likely be able to apply the voucher to this Rx for free insulin for you.     The name of the new insulin pump is a Egomotion 770 and it has a Continuous glucose monitor with it (CGM) called a Guardian. This will connect with your cell phone and you will just be using novolog or novolin R within your insulin pump.   You will no longer need your long acting insulin.   Your sugars will become much more controlled on insulin when it is pathophysiologically delivered to you the way your body is meant to receive it.     For low blood sugar - remember to keep glucose tablets on hand. You can purchase these at the pharmacy check out desk/over the counter.   If blood sugar is less than 70, take/eat 2 - 3 tablets quickly to bring your sugar up.   Always keep 15 grams of Quick acting carbohydrates on hand to eat/drink if your sugar is low - examples are 1/2 cup of juice, coke, or crackers, granola bars.   Remember to eat meals frequently to prevent low blood blood sugars.     Low Carb Snacks & Diabetes friendly foods   Aim for 30 - 40 grams of carbs for 3 meals per day (or 2 meals and  1 - 2 snacks - however you prefer)  Snacks can be 15 - 20 grams of carbs.     Eating small, frequent meals will help you to feel more satisfied, and more full so that you don't over eat or eat the wrong foods later.   Also, gonsalo meals/snacks allow you to spread carbohydrates evenly, which may stabilize blood sugars.   Below you will find a list of ideas of foods that I like/prefer.   Feel free to give me your ideas and tips if you find good ones too!   Overall - please remember to limit refined sugars such as soft  "drinks, juices, rices, pastas, breads, cakes.   Look at the back of the label - look at the amount of "carbohydrates", then look at the amount of "fiber" - subtract the amount of fiber from the amount of carbs - this is your "net carb intake".  The more fiber a food has, the better generally, as you get to subtract this from the net carbs.     0-5 gm carb   Crystal Light flavoring (I like fruit punch flavor!)   Vitamin Water Zero   Abby antioxidant drinks.    Sparkling ice (long skinny flavored water bottles for $1 that are sugar free).    Sheila water, WaterLoo - carbonated flavored blanc, various flavors.   Diet coke, diet barqs, sprite zero.   Diet sunkist (orange drink)   Sugar free powerade   Herbal tea, unsweetened   2 tsp peanut butter on celery or carrots   Regina's original Candies - (the Sugar Free ones!)   1/2 cup sugar-free jell-o   1 sugar-free popsicle   ¼ cup blueberries or strawberries   8oz Blue Mari unsweetened almond milk (or there is sugar free vanilla flavored as well).   5 baby carrots & celery sticks, cucumbers, bell peppers dipped in ¼ cup salsa, 2Tbsp light ranch dressing or 2Tbsp plain Greek yogurt   10 Goldfish crackers   ½ oz low-fat cheese or string cheese   1 closed handful of nuts, unsalted (example-->almonds, pistachios, cashews, peanuts, etc).   1 Tbsp of sunflower seeds, unsalted   1 cup Smart Pop popcorn   1 whole grain brown rice cake    "Think Thin" protein bars - my personal favorite is Creamy Peanut butter, chocolate brownie, or oreo flavors.   "Oglesby" bars  - can be found at [employer] grocery store - they have 5 grams of net carbohydrates.   Quest bars (my favorite is birthday cake, and also cinnamon roll is good melted for 10 seconds in the microwave - please remove the wrapper first!)   Premier protein shakes - sold at [employer], or other brand alternatives - usually 1 - 2 grams of carbs (strawberry, vanilla, chocolate flavors) Coffee flavor is " "my new morning favorite!    Scrambled eggs! Or a fried egg or boiled eggs - add sliced tomatoes, cilantro or some chopped green onions.    Eggs are good with any and all veggies! You can even eat them for dinner or in a low carb tortilla, or served with your favorite grilled meat/sausage or ball is my favorite.    Maria Guadalupe pierre - zucchini - can buy in the frozen foods section. Birds eye brand is usually cheap. Tip - saute with oil/onions or italian seasoning and use this as a pasta substitution.   Milk - is usually high in carbs/sugar - use Transactis milk brand instead - it is "filtered" milk - with half the amount of carbs of regular milk. (next to the milk in milk aisle).    Smuckers sugar free Breakfast syrup (or 1 tablespoon of low sugar breakfast syrup) instead of regular syrup.        15 gm carb   1 small piece of fruit or ½ banana or 1/2 cup lite canned fruit   3 arik cracker squares   3 cups Smart Pop popcorn, top spray butter, Cook lite salt or cinnamon and Truvia   5 Vanilla Wafers   ½ cup low fat, no added sugar ice cream or frozen yogurt (Blue bell, Blue Bunny, Weight Watchers, Skinny Cow)   1/2 - 1 cup Light n' fit Vanilla yogurt (has added protein in it to make you feel full).    ½ turkey, ham, or chicken sandwich   ½ c fruit with ½ c Cottage cheese   4-6 unsalted wheat crackers with 1 oz low fat cheese or 1 tbsp peanut butter    30-45 goldfish crackers (depending on flavor)    7-8 Christianity mini brown rice cakes (caramel, apple cinnamon, chocolate)    12 Christianity mini brown rice cakes (cheddar, bbq, ranch)    1/3 cup hummus dip with raw veg   1/2 whole wheat aundrea, 1Tbsp hummus   Mini Pizza (1/2 whole wheat English muffin, low-fat  cheese, tomato sauce)   100 calorie snack pack (Oreo, Chips Ahoy, Ritz Mix, Baked Cheetos)   4-6 oz. light or Greek Style yogurt (Chobani, Yoplait, Okios, [place])   ½ cup sugar-free pudding     6 in. wheat tortilla or aundrea oven toasted chips " "(topped with spray butter flavoring, cinnamon, Truvia OR spray butter, garlic powder, chili powder)    18 BBQ Popchips (available at [employer], [employer], [employer])   Mini bagel (small size) toasted - add fat free cream cheese or avocado and sliced tomato on top - yum!    1/2 cup Halo top icecream - birthday cake is my go-to flavor.    Truth Bars - can be found at [employer] - Net carbs is 11 - 12 grams depending on the flavor.    Kind bars = mostly nuts and dried berries - find at most local groceries stores, drug stores, whole foods, fresh market. Net carbs is around 10 grams.     Smoothie Alfonzo - I'm often asked "what smoothie is healthy for me".   Do not be decieved to think that all smoothies are "healthy". In fact, most are loaded with hidden sugars.   Here are some from the menu that you are allowed to have being diabetic:   The gladiator - any flavor.   Keto champ (berry, chocolate or coffee - all have 10 grams of carbs).   The Lean 1 smoothies all have 15 - 20 grams of carbs, so this is slightly more. But would be ok for a meal substitute or snack.   The Shredder in Vanilla or chocolate only. (the strawberry has more sugar considerably)    Tips and Tricks:     Eat an extra vegetable once per day - green veggies (such as broccoli, cauliflower, green beans) - make you feel full and are low in sugar.     My favorite "secret" seasoning to make things extra yummy is cinnamon for sweet taste   For an added secret "salty" taste - add "everything but the bagel" seasoning (you can get on amazon.com or at  joes. I have seen at local groceries such as [employer] too!).     Dark colored fruits are your friend -- blueberries, strawberries, raspberries, blackberries. They have a lower sugar content. So will be the best choice for you.   Light colored fruits are NOT your friend - they tend to be higher in sugar and are not the best options for an ADA diet - examples are oranges, bananas, peaches, watermelon, -- " "you can eat these, but carefully and in moderation.     WATER. I cannot emphasize drinking water enough - it will hydrate you, make you full. Your body needs it - it's a natural appetite suppressant.   Sometimes hunger and thirst can feel the same. Try drinking some water first, then eat if you are still hungry.     Other snack choices    Celery with peanut butter   Celery with tuna salad   Dill pickles and cheddar cheese (no kidding, it's a great combo)   Nuts (keep raw ones in the freezer if you think you'll overeat them)   Sunflower seeds (get them in the shell so it will take longer to eat them)   Other seeds (How to Toast Pumpkin or Squash Seeds)    Pistachios or almonds - will fill you up and are tasty!    Low-Carb Trail Mix   Jerky (beef or turkey -- try to find low-sugar varieties)   Salami slices (you can find in the deli section)   Cheese sticks, such as string cheese   Sugar-free Jello, alone or with cottage cheese and a sprinkling of nuts. Make sugar-free lime Jello with part coconut milk -- For a large package, dissolve the powder in a cup of boiling water, add a can of coconut milk, and then add the rest of the water. Stir well.   Pepperoni "chips" -- Zap the slices in the microwave   Cheese with a few apple slices   4-ounce plain or sugar-free yogurt with berries and flax seed meal   Smoked salmon and cream cheese on cucumber slices   Lettuce Roll-ups -- Roll luncheon meat, egg salad, tuna or other filling and veggies in lettuce leaves   Lunch Meat Roll-ups -- Roll cheese or veggies in lunch meat (read the labels for carbs on the lunch meat)   Spread bean dip, spinach dip, or other low-carb dip or spread on the lunch meat or lettuce and then roll it up   Raw veggies and spinach dip, or other low-carb dip   Pork rinds ([place]), with or without dip   Ricotta cheese with fruit and/or nuts and/or flax seed meal   Mushrooms with cheese spread inside (or other spreads or " dips)   Low-carb snack bars (watch out for sugar alcohols, especially maltitol)   Product Review: Atkins Advantage Bars   Pepperoni Chips -- Microwave pepperoni slices until crisp. Great with cheeses and dips   Garlic Parmesan Flax Seed Crackers   Parmesan Crisps -- Good when you want a crunchy snack.   Peanut Butter Protein Balls

## 2020-12-31 NOTE — PROGRESS NOTES
HPI: Billy Rivera is a 55 y.o.  male c/I for visit to address Diabetes Type 2  This is the first time I am seeing him for care, follows with Dr. BRAD Baker MD for primary care needs.   Recently has switched to seeing dr. Foreman for primary care needs.   Has not seen endocrinology or diabetes specialist in the past.     He had recent covid 19 infection, but this has since resolved.   He was diagnosed with T2DM about 20 years ago  Family history - both sides strong history.   Has never been hospitalized r/t DM.  Denies missing doses of DM medication.     Current a1c @ 10.5%. Historically uncontrolled in past.   Current meds -   Metformin 1000 bid.   Began insulin shortly after his diagnosis.   lantus 55 units every night.   novolin R ac meals - 25 units tid.  (previously on novolog but insurance stopped covering).   Tried trulicity in his past, but caused severe constipation. Does not really want to try drug class again for that reason.   Has never tried SGLT2i's.     He is feeling well, does not feel when his sugars are high or low.  Does not titrate his insulin based on glucose readings. Generally always gives the full 25 units as his sugars just remain high regardless  Of what he eats or what he does.   He admits not always following a diabetic diet, finds it hard to eliminate carbs or limit them.     Complications from diabetes -   +Retinopathy.   + CV disease   + history of MI, Nstemi in May 2013. Got stent placed.   -nephropathy.   Large foot ulcer on left foot - receiving IV antibiotics via right upper arm picc line.   In boot with wound vac. Following with podiatry.        Past medical History:   Past Medical History:   Diagnosis Date    Allergy     CAD (coronary artery disease), native coronary artery 6/25/2013    Cancer     COVID-19 virus detected 9/1/2020    Diabetes mellitus     Diabetes mellitus, type 2     Disorder of kidney and ureter     Heart attack 04/2012    Hx of colon cancer,  stage I     Hyperlipidemia     Hypertension     Muscular pain     post-op after colonoscopy    NSTEMI May 2013 - peak troponin 0.22 5/22/2013    MICHAELA (obstructive sleep apnea)     Retinopathy due to secondary diabetes       Family hx:   Family History   Problem Relation Age of Onset    Heart disease Mother     No Known Problems Father     Heart disease Brother     Anesthesia problems Neg Hx       Current meds:   Current Outpatient Medications:     acetaminophen (TYLENOL) 325 MG tablet, Take 325 mg by mouth every 6 (six) hours as needed for Pain., Disp: , Rfl:     aspirin/salicylamide/caffeine (BC HEADACHE POWDER ORAL), Take 1 Package by mouth daily as needed (pain)., Disp: , Rfl:     atorvastatin (LIPITOR) 80 MG tablet, Take 1 tablet (80 mg total) by mouth once daily., Disp: 30 tablet, Rfl: 11    blood sugar diagnostic Strp, Strips and lancets  Use before meals and bedtime, Disp: 150 strip, Rfl: 12    carvediloL (COREG) 12.5 MG tablet, Take 1 tablet (12.5 mg total) by mouth 2 (two) times daily., Disp: 60 tablet, Rfl: 11    cefTRIAXone (ROCEPHIN) 2 g/50 mL PgBk IVPB, Inject 50 mLs (2 g total) into the vein once daily., Disp: 1500 mL, Rfl: 1    clotrimazole-betamethasone 1-0.05% (LOTRISONE) cream, Apply topically 2 (two) times daily., Disp: 45 g, Rfl: 2    ibuprofen (ADVIL,MOTRIN) 200 MG tablet, Take 200 mg by mouth every 6 (six) hours as needed for Pain., Disp: , Rfl:     insulin (LANTUS SOLOSTAR U-100 INSULIN) glargine 100 units/mL (3mL) SubQ pen, Inject 60 Units into the skin every evening., Disp: 4 Box, Rfl: 3    insulin regular 100 unit/mL Inj injection, Inject 25 Units into the skin 3 (three) times daily before meals., Disp: 10 mL, Rfl: 11    lisinopriL (PRINIVIL,ZESTRIL) 40 MG tablet, Take 1 tablet (40 mg total) by mouth once daily., Disp: 30 tablet, Rfl: 11    melatonin (MELATIN) 3 mg tablet, Take 2 tablets (6 mg total) by mouth nightly as needed for Insomnia., Disp:  , Rfl: 0     "metFORMIN (GLUCOPHAGE) 1000 MG tablet, Take 1 tablet (1,000 mg total) by mouth 2 (two) times daily with meals., Disp: 60 tablet, Rfl: 11    metroNIDAZOLE (FLAGYL) 500 MG tablet, Take 1 tablet (500 mg total) by mouth every 8 (eight) hours. for 14 days, Disp: 42 tablet, Rfl: 0    pen needle, diabetic 31 gauge x 3/16" Ndle, Inject 1 each into the skin 3 (three) times daily before meals. (Patient taking differently: Inject 1 each into the skin 4 (four) times daily. ), Disp: 100 each, Rfl: 12    aspirin (ECOTRIN) 81 MG EC tablet, Take 1 tablet (81 mg total) by mouth once daily., Disp: , Rfl: 0    empagliflozin (JARDIANCE) 25 mg tablet, Take 1 tablet (25 mg total) by mouth once daily., Disp: 30 tablet, Rfl: 3    insulin aspart U-100 (NOVOLOG U-100 INSULIN ASPART) 100 unit/mL injection, Inject 70 Units into the skin continuous., Disp: 40 mL, Rfl: 3    MINIMED 770G INSULIN PUMP Misc, 1 each by Misc.(Non-Drug; Combo Route) route continuous. Medically necessary for management of Type 2 diabetes, E11.65, Disp: 1 each, Rfl: 0     Current Diabetes medications:   Long acting insulin: lantus 55 units every night.   Short acting insulin:  novolin r 25 units tid ac meals - takes 5 - 15 minutes prior to meals.   Metformin 100mg po bid with food    Medications Tried and Failed:   trulicity - severe constipation   novolog - insurance wouldn't cover so switched to novolin r.    Review of Pertinent co-morbidities/risk factors:   CV: + history of MI   CAD: yes nstemi with stent.   Takes aspirin 81mg tablet daily  BP: has history of HTN  Statin: Taking  ACE/ARB: Taking    Social History     Tobacco Use   Smoking Status Never Smoker   Smokeless Tobacco Never Used        Social:   Lives at home with: wife.   Life changes/stressors currently: has ulcer to left foot - so off work for now while this heals.   Diet:  Not always following ADA diet   Meals: 3 per day and snacks.        Breakfast - cornflakes with milk, eggs, oatmeal       " "Lunch - precooked meals from [employer]. (meat/veggie/small carb).        Dinner - hot plate meals from restaurants - meat/veggie/starch. Chicken and vegetables, beef, occasionally rice.   Spaghetti and meatballs.        Snacks - doritos bags, cheetos, kind bar reduced sugar.        Drinks - diet tea, milk, diet lemonade, diet coke, water.   Exercise: nothing formal, but normally walking a lot in his work.   Activities: working full time as  at [employer].     Glucose Monitoring:   Checking sugars 4x/day by fingerstick.   CGM - has never used, Free style florentino and dexcom are both excluded from his insurance plan.     Standards of care:   Eyes: .: 08/15/2019  Foot exam: : 12/21/2020   Diabetes education: None.    Vital Signs  /80 (BP Location: Left arm, Patient Position: Sitting, BP Method: Medium (Manual))   Pulse 68   Temp 98.2 °F (36.8 °C) (Temporal)   Resp 16   Ht 6' 2" (1.88 m)   Wt 110 kg (242 lb 8.1 oz)   SpO2 99%   BMI 31.14 kg/m²     Pertinent Labs:   Hgba1c   Lab Results   Component Value Date    HGBA1C 10.5 (H) 12/22/2020    HGBA1C 10.8 (H) 10/13/2020    HGBA1C 11.5 (H) 08/12/2020     Lipid panel   Lab Results   Component Value Date    CHOL 144 08/12/2020    CHOL 172 08/07/2019    CHOL 126 06/29/2017     Lab Results   Component Value Date    HDL 39 (L) 08/12/2020    HDL 40 08/07/2019    HDL 36 (L) 06/29/2017     Lab Results   Component Value Date    LDLCALC 66.6 08/12/2020    LDLCALC 100.8 08/07/2019    LDLCALC 67.0 06/29/2017     Lab Results   Component Value Date    TRIG 192 (H) 08/12/2020    TRIG 156 (H) 08/07/2019    TRIG 115 06/29/2017     Lab Results   Component Value Date    CHOLHDL 27.1 08/12/2020    CHOLHDL 23.3 08/07/2019    CHOLHDL 28.6 06/29/2017      CMP  Glucose   Date Value Ref Range Status   12/30/2020 291 (H) 70 - 110 mg/dL Final     BUN   Date Value Ref Range Status   12/30/2020 20 6 - 20 mg/dL Final     Creatinine   Date Value Ref Range Status   12/30/2020 1.0 0.5 - " 1.4 mg/dL Final     eGFR if    Date Value Ref Range Status   12/30/2020 >60.0 >60 mL/min/1.73 m^2 Final     eGFR if non    Date Value Ref Range Status   12/30/2020 >60.0 >60 mL/min/1.73 m^2 Final     Comment:     Calculation used to obtain the estimated glomerular filtration  rate (eGFR) is the CKD-EPI equation.        AST   Date Value Ref Range Status   12/30/2020 22 10 - 40 U/L Final     ALT   Date Value Ref Range Status   12/30/2020 41 10 - 44 U/L Final     Microalbumin creatinine ratio:   Lab Results   Component Value Date    MICALBCREAT 202.8 (H) 08/12/2020       Review Of Systems:   Gen: Appetite good,+ weight loss, + fatigue.  Denies polydipsia.  Skin: Skin is intact and heals well, denies any rashes or hair changes.   EENT: Denies any acute visual disturbances, nor blurred vision.   Resp: Denies SOB or Dyspnea on exertion, denies cough.   Cardiac: Denies chest pain, palpitations, or swelling.   GI: Denies abdominal pain, nausea or vomiting, diarrhea, or constipation.   /GYN: Denies nocturia, nor burning, frequency or pain on urination.  MS/Neuro: Denies numbness/ tingling in BLE; Gait steady, speech clear  Psych: Denies drug/ETOH abuse, no hx of depression.  Other systems: negative.    Physical Exam:   GENERAL: Well developed, well nourished in appearance.   PSYCH: AAOx3, appropriate mood and affect, pleasant expression, conversant, appears relaxed, well groomed.   EYES: PERRL, Conjunctiva and corneas clear  NECK: Soft and Supple, trachea midline  CHEST: Even, regular, and unlabored respirations  ABDOMEN: Soft, non-tender, non-distended. Normal bowel sounds.   VASCULAR: pedal pulses palpable bilaterally, no edema.  NEURO:  cranial nerves II - XII intact   MUSCULOSKELETAL: Good ROM, equal strength, equal hand grasp, steady gait.   SKIN: Skin warm, dry, and intact - right upper arm Picc line in place.   FEET: right foot is wnl.   Left foot in boot with wound vac attached and  dressing on. Did not assess or remove wound vac.   Redness/dark purple color to skin around left mid calf region - above boot.   Skin is warm and dry in this area and blanchable.     Assessment and Plan of Care:     Billy was seen today for diabetes.    Diagnoses and all orders for this visit:    Type 2 diabetes mellitus 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, with long-term current use of insulin  -     Ambulatory referral/consult to Diabetes Education; Future  -     MINIMED 770G INSULIN PUMP Misc; 1 each by Misc.(Non-Drug; Combo Route) route continuous. Medically necessary for management of Type 2 diabetes, E11.65    Hypertension associated with diabetes    Dyslipidemia associated with type 2 diabetes mellitus    Coronary artery disease involving native coronary artery of native heart without angina pectoris    Osteomyelitis of left foot, unspecified type    Diabetic ulcer of midfoot associated with type 2 diabetes mellitus, limited to breakdown of skin, unspecified laterality    Obesity, diabetes, and hypertension syndrome    Class 2 severe obesity with body mass index (BMI) of 35 to 39.9 with serious comorbidity    Other orders  -     empagliflozin (JARDIANCE) 25 mg tablet; Take 1 tablet (25 mg total) by mouth once daily.  -     Discontinue: sub-q insulin device, 30 unit (V-GO 30) Haley; 1 Device by Misc.(Non-Drug; Combo Route) route once daily.  -     insulin aspart U-100 (NOVOLOG U-100 INSULIN ASPART) 100 unit/mL injection; Inject 70 Units into the skin continuous.  -     Discontinue: blood-glucose sensor (DEXCOM G6 SENSOR) Haley; Apply/change sensor to skin every 10 days.  -     Discontinue: blood-glucose transmitter (DEXCOM G6 TRANSMITTER) Haley; Use transmitter in sensor with G6 system. Change new transmitter every 3 months.       1. T2DM with hyperglycemia- Hgba1c goal is 7.5% or less without hypoglycemia - 11.5%--> 10.8%--> 10.5%   discussed DM, progression  of disease, long term complications, CV risk factors and tx options.   Has already had MI in past.   Continue metformin 1000 bid.   Start SGLT2i.   Start Jardiance 25 mg - Take 1 tablet po daily.   Discussed MOA, possible side effects including yeast infection, UTI, dehydration and ketoacidosis, importance of maintaining hydration and avoiding No carb diets. Good use hygiene. Notify my office if any side effects. Will monitor renal function closely. Stable at present.   Continue same MDI for now -   lantus 55 units HS, and novolin R 25 units tid ac meals.   Sent in Rx for novolog vials - should get [employer] voucher to get free insulin for first fill.   Would rather him be on novolog instead of novolin R due to onset of action time and safety profile.   Had been interested in vgo and dexcom - but these are excluded on his insurance plan.   Will organize to switch from MDI to medtronic 770 insulin pump with Guardian CGM.   Patient likes to talk/text/use his smart phone so looking forward to insulin pump and getting sugars under control.   Advise compliance with ADA diet and encourage exercise  Reviewed  hypoglycemia, s/s and appropriate tx. Have/get quick acting glucose tablets at hand.   Instructed to monitor Blood glucose 2 - 4x/day and bring meter/ log to every clinic visit.   Putting him on "[employer] Pro today in clinic - will have him return for nurse visit in 1 week to remove/download and fully assess trends and insulin needs.   Instruction given on monitor.     2. HTN - controlled, continue meds as previously prescribed and monitor.   Coreg 12.5 po bid.   Lisinopril 40.   Urine mac + 202 - should improve as bg's improve.     3. HLD - LDL goal < 100. He is at goal.   Currently on statin therapy- lipitor 80 mg every HS.   History of MI/nstemi.   On aspirin daily.     4. Weight - BMI Body mass index is 31.14 kg/m².   Encourage Ada diet and exercise.     5. Renal Function - stable. No history of nephropathy.   Has  clinical urine mac +     6. Left foot ulcer - on current antibiotics via picc right upper arm.   Also in boot with wound vac. Followed by podiatry.        Nurse visit in 1 week to remove the free style florentino Pro.   Meet with Nurse educator nimisha sandoval to train on pump and new cgm.   Follow up in 6 weeks with OV and bg logs.

## 2021-01-04 ENCOUNTER — TELEPHONE (OUTPATIENT)
Dept: PODIATRY | Facility: CLINIC | Age: 56
End: 2021-01-04

## 2021-01-04 ENCOUNTER — EXTERNAL HOME HEALTH (OUTPATIENT)
Dept: HOME HEALTH SERVICES | Facility: HOSPITAL | Age: 56
End: 2021-01-04
Payer: COMMERCIAL

## 2021-01-04 ENCOUNTER — PATIENT MESSAGE (OUTPATIENT)
Dept: ADMINISTRATIVE | Facility: HOSPITAL | Age: 56
End: 2021-01-04

## 2021-01-05 ENCOUNTER — OFFICE VISIT (OUTPATIENT)
Dept: PODIATRY | Facility: CLINIC | Age: 56
End: 2021-01-05
Payer: COMMERCIAL

## 2021-01-05 VITALS
SYSTOLIC BLOOD PRESSURE: 90 MMHG | DIASTOLIC BLOOD PRESSURE: 60 MMHG | HEART RATE: 65 BPM | TEMPERATURE: 98 F | WEIGHT: 242.5 LBS | BODY MASS INDEX: 31.12 KG/M2 | HEIGHT: 74 IN

## 2021-01-05 DIAGNOSIS — L02.612 ABSCESS OF LEFT FOOT: Primary | ICD-10-CM

## 2021-01-05 DIAGNOSIS — E11.3393 TYPE 2 DIABETES MELLITUS WITH BOTH EYES AFFECTED BY MODERATE NONPROLIFERATIVE RETINOPATHY WITHOUT MACULAR EDEMA, WITH LONG-TERM CURRENT USE OF INSULIN: ICD-10-CM

## 2021-01-05 DIAGNOSIS — L97.502 ULCER OF FOOT WITH FAT LAYER EXPOSED, UNSPECIFIED LATERALITY: ICD-10-CM

## 2021-01-05 DIAGNOSIS — Z79.4 TYPE 2 DIABETES MELLITUS WITH BOTH EYES AFFECTED BY MODERATE NONPROLIFERATIVE RETINOPATHY WITHOUT MACULAR EDEMA, WITH LONG-TERM CURRENT USE OF INSULIN: ICD-10-CM

## 2021-01-05 PROCEDURE — 99999 PR PBB SHADOW E&M-EST. PATIENT-LVL IV: ICD-10-PCS | Mod: PBBFAC,,, | Performed by: PODIATRIST

## 2021-01-05 PROCEDURE — 99213 PR OFFICE/OUTPT VISIT, EST, LEVL III, 20-29 MIN: ICD-10-PCS | Mod: 25,S$GLB,, | Performed by: PODIATRIST

## 2021-01-05 PROCEDURE — 99213 OFFICE O/P EST LOW 20 MIN: CPT | Mod: 25,S$GLB,, | Performed by: PODIATRIST

## 2021-01-05 PROCEDURE — 99999 PR PBB SHADOW E&M-EST. PATIENT-LVL IV: CPT | Mod: PBBFAC,,, | Performed by: PODIATRIST

## 2021-01-06 ENCOUNTER — LAB VISIT (OUTPATIENT)
Dept: LAB | Facility: HOSPITAL | Age: 56
End: 2021-01-06
Attending: INTERNAL MEDICINE
Payer: COMMERCIAL

## 2021-01-06 DIAGNOSIS — Z79.2 ENCOUNTER FOR LONG-TERM (CURRENT) USE OF ANTIBIOTICS: ICD-10-CM

## 2021-01-06 DIAGNOSIS — L03.116 CELLULITIS OF LEFT FOOT: Primary | ICD-10-CM

## 2021-01-06 LAB
ALBUMIN SERPL BCP-MCNC: 3.1 G/DL (ref 3.5–5.2)
ALP SERPL-CCNC: 136 U/L (ref 55–135)
ALT SERPL W/O P-5'-P-CCNC: 17 U/L (ref 10–44)
ANION GAP SERPL CALC-SCNC: 12 MMOL/L (ref 8–16)
AST SERPL-CCNC: 15 U/L (ref 10–40)
BASOPHILS # BLD AUTO: 0.05 K/UL (ref 0–0.2)
BASOPHILS NFR BLD: 0.9 % (ref 0–1.9)
BILIRUB SERPL-MCNC: 0.3 MG/DL (ref 0.1–1)
BUN SERPL-MCNC: 22 MG/DL (ref 6–20)
CALCIUM SERPL-MCNC: 9.4 MG/DL (ref 8.7–10.5)
CHLORIDE SERPL-SCNC: 99 MMOL/L (ref 95–110)
CO2 SERPL-SCNC: 24 MMOL/L (ref 23–29)
CREAT SERPL-MCNC: 1 MG/DL (ref 0.5–1.4)
CRP SERPL-MCNC: 7.6 MG/L (ref 0–8.2)
DIFFERENTIAL METHOD: ABNORMAL
EOSINOPHIL # BLD AUTO: 0.1 K/UL (ref 0–0.5)
EOSINOPHIL NFR BLD: 2.4 % (ref 0–8)
ERYTHROCYTE [DISTWIDTH] IN BLOOD BY AUTOMATED COUNT: 14 % (ref 11.5–14.5)
ERYTHROCYTE [SEDIMENTATION RATE] IN BLOOD BY WESTERGREN METHOD: 92 MM/HR (ref 0–23)
EST. GFR  (AFRICAN AMERICAN): >60 ML/MIN/1.73 M^2
EST. GFR  (NON AFRICAN AMERICAN): >60 ML/MIN/1.73 M^2
GLUCOSE SERPL-MCNC: 338 MG/DL (ref 70–110)
HCT VFR BLD AUTO: 38.3 % (ref 40–54)
HGB BLD-MCNC: 12.4 G/DL (ref 14–18)
IMM GRANULOCYTES # BLD AUTO: 0.02 K/UL (ref 0–0.04)
IMM GRANULOCYTES NFR BLD AUTO: 0.3 % (ref 0–0.5)
LYMPHOCYTES # BLD AUTO: 1.6 K/UL (ref 1–4.8)
LYMPHOCYTES NFR BLD: 28 % (ref 18–48)
MCH RBC QN AUTO: 28.3 PG (ref 27–31)
MCHC RBC AUTO-ENTMCNC: 32.4 G/DL (ref 32–36)
MCV RBC AUTO: 87 FL (ref 82–98)
MONOCYTES # BLD AUTO: 0.6 K/UL (ref 0.3–1)
MONOCYTES NFR BLD: 10.4 % (ref 4–15)
NEUTROPHILS # BLD AUTO: 3.4 K/UL (ref 1.8–7.7)
NEUTROPHILS NFR BLD: 58 % (ref 38–73)
NRBC BLD-RTO: 0 /100 WBC
PLATELET # BLD AUTO: 349 K/UL (ref 150–350)
PMV BLD AUTO: 10.6 FL (ref 9.2–12.9)
POTASSIUM SERPL-SCNC: 4.4 MMOL/L (ref 3.5–5.1)
PROT SERPL-MCNC: 7.6 G/DL (ref 6–8.4)
RBC # BLD AUTO: 4.38 M/UL (ref 4.6–6.2)
SODIUM SERPL-SCNC: 135 MMOL/L (ref 136–145)
WBC # BLD AUTO: 5.79 K/UL (ref 3.9–12.7)

## 2021-01-06 PROCEDURE — 80053 COMPREHEN METABOLIC PANEL: CPT

## 2021-01-06 PROCEDURE — 85652 RBC SED RATE AUTOMATED: CPT

## 2021-01-06 PROCEDURE — 86140 C-REACTIVE PROTEIN: CPT

## 2021-01-06 PROCEDURE — 85025 COMPLETE CBC W/AUTO DIFF WBC: CPT

## 2021-01-08 DIAGNOSIS — Z79.4 TYPE 2 DIABETES MELLITUS WITH BOTH EYES AFFECTED BY MODERATE NONPROLIFERATIVE RETINOPATHY WITHOUT MACULAR EDEMA, WITH LONG-TERM CURRENT USE OF INSULIN: ICD-10-CM

## 2021-01-08 DIAGNOSIS — E11.3393 TYPE 2 DIABETES MELLITUS WITH BOTH EYES AFFECTED BY MODERATE NONPROLIFERATIVE RETINOPATHY WITHOUT MACULAR EDEMA, WITH LONG-TERM CURRENT USE OF INSULIN: ICD-10-CM

## 2021-01-08 RX ORDER — SUBCUTANEOUS INSULIN PUMP
1 EACH MISCELLANEOUS CONTINUOUS
Qty: 1 EACH | Refills: 0 | Status: SHIPPED | OUTPATIENT
Start: 2021-01-08 | End: 2023-11-28

## 2021-01-09 PROBLEM — Z79.2 RECEIVING INTRAVENOUS ANTIBIOTIC TREATMENT AS OUTPATIENT: Status: ACTIVE | Noted: 2021-01-09

## 2021-01-10 ENCOUNTER — PATIENT OUTREACH (OUTPATIENT)
Dept: ADMINISTRATIVE | Facility: OTHER | Age: 56
End: 2021-01-10

## 2021-01-11 ENCOUNTER — OFFICE VISIT (OUTPATIENT)
Dept: INFECTIOUS DISEASES | Facility: CLINIC | Age: 56
End: 2021-01-11
Payer: COMMERCIAL

## 2021-01-11 VITALS
WEIGHT: 244.94 LBS | HEIGHT: 74 IN | TEMPERATURE: 98 F | BODY MASS INDEX: 31.43 KG/M2 | DIASTOLIC BLOOD PRESSURE: 70 MMHG | HEART RATE: 60 BPM | SYSTOLIC BLOOD PRESSURE: 107 MMHG

## 2021-01-11 DIAGNOSIS — M86.9 OSTEOMYELITIS OF LEFT FOOT, UNSPECIFIED TYPE: Primary | ICD-10-CM

## 2021-01-11 DIAGNOSIS — E11.65 UNCONTROLLED TYPE 2 DIABETES MELLITUS WITH HYPERGLYCEMIA: ICD-10-CM

## 2021-01-11 DIAGNOSIS — Z79.2 RECEIVING INTRAVENOUS ANTIBIOTIC TREATMENT AS OUTPATIENT: ICD-10-CM

## 2021-01-11 PROCEDURE — 99999 PR PBB SHADOW E&M-EST. PATIENT-LVL IV: ICD-10-PCS | Mod: PBBFAC,,, | Performed by: NURSE PRACTITIONER

## 2021-01-11 PROCEDURE — 99213 PR OFFICE/OUTPT VISIT, EST, LEVL III, 20-29 MIN: ICD-10-PCS | Mod: S$GLB,,, | Performed by: NURSE PRACTITIONER

## 2021-01-11 PROCEDURE — 99213 OFFICE O/P EST LOW 20 MIN: CPT | Mod: S$GLB,,, | Performed by: NURSE PRACTITIONER

## 2021-01-11 PROCEDURE — 99999 PR PBB SHADOW E&M-EST. PATIENT-LVL IV: CPT | Mod: PBBFAC,,, | Performed by: NURSE PRACTITIONER

## 2021-01-12 ENCOUNTER — CLINICAL SUPPORT (OUTPATIENT)
Dept: INTERNAL MEDICINE | Facility: CLINIC | Age: 56
End: 2021-01-12
Payer: COMMERCIAL

## 2021-01-12 DIAGNOSIS — E11.51 TYPE 2 DIABETES MELLITUS WITH DIABETIC PERIPHERAL ANGIOPATHY WITHOUT GANGRENE, WITH LONG-TERM CURRENT USE OF INSULIN: Primary | ICD-10-CM

## 2021-01-12 DIAGNOSIS — Z79.4 TYPE 2 DIABETES MELLITUS WITH DIABETIC PERIPHERAL ANGIOPATHY WITHOUT GANGRENE, WITH LONG-TERM CURRENT USE OF INSULIN: Primary | ICD-10-CM

## 2021-01-12 PROCEDURE — 95251 PR GLUCOSE MONITOR, 72 HOUR, PHYS INTERP: ICD-10-PCS | Mod: S$GLB,,, | Performed by: NURSE PRACTITIONER

## 2021-01-12 PROCEDURE — 95250 PR GLUCOSE MONITORING,72 HRS,SUB-Q SENSOR: ICD-10-PCS | Mod: S$GLB,,, | Performed by: NURSE PRACTITIONER

## 2021-01-12 PROCEDURE — 95250 CONT GLUC MNTR PHYS/QHP EQP: CPT | Mod: S$GLB,,, | Performed by: NURSE PRACTITIONER

## 2021-01-12 PROCEDURE — 95251 CONT GLUC MNTR ANALYSIS I&R: CPT | Mod: S$GLB,,, | Performed by: NURSE PRACTITIONER

## 2021-01-20 ENCOUNTER — OFFICE VISIT (OUTPATIENT)
Dept: PODIATRY | Facility: CLINIC | Age: 56
End: 2021-01-20
Payer: COMMERCIAL

## 2021-01-20 VITALS
SYSTOLIC BLOOD PRESSURE: 126 MMHG | WEIGHT: 244.94 LBS | DIASTOLIC BLOOD PRESSURE: 73 MMHG | BODY MASS INDEX: 31.45 KG/M2 | HEART RATE: 72 BPM

## 2021-01-20 DIAGNOSIS — Z79.4 TYPE 2 DIABETES MELLITUS WITH BOTH EYES AFFECTED BY MODERATE NONPROLIFERATIVE RETINOPATHY WITHOUT MACULAR EDEMA, WITH LONG-TERM CURRENT USE OF INSULIN: Primary | ICD-10-CM

## 2021-01-20 DIAGNOSIS — E11.3393 TYPE 2 DIABETES MELLITUS WITH BOTH EYES AFFECTED BY MODERATE NONPROLIFERATIVE RETINOPATHY WITHOUT MACULAR EDEMA, WITH LONG-TERM CURRENT USE OF INSULIN: Primary | ICD-10-CM

## 2021-01-20 DIAGNOSIS — L97.502 ULCER OF FOOT WITH FAT LAYER EXPOSED, UNSPECIFIED LATERALITY: ICD-10-CM

## 2021-01-20 LAB
FUNGUS SPEC CULT: NORMAL
FUNGUS SPEC CULT: NORMAL

## 2021-01-20 PROCEDURE — 99999 PR PBB SHADOW E&M-EST. PATIENT-LVL III: CPT | Mod: PBBFAC,,, | Performed by: PODIATRIST

## 2021-01-20 PROCEDURE — 11043 DBRDMT MUSC&/FSCA 1ST 20/<: CPT | Mod: RT,S$GLB,, | Performed by: PODIATRIST

## 2021-01-20 PROCEDURE — 11043 PR DEBRIDEMENT, SKIN, SUB-Q TISSUE,MUSCLE,=<20 SQ CM: ICD-10-PCS | Mod: RT,S$GLB,, | Performed by: PODIATRIST

## 2021-01-20 PROCEDURE — 99999 PR PBB SHADOW E&M-EST. PATIENT-LVL III: ICD-10-PCS | Mod: PBBFAC,,, | Performed by: PODIATRIST

## 2021-01-20 PROCEDURE — 99213 PR OFFICE/OUTPT VISIT, EST, LEVL III, 20-29 MIN: ICD-10-PCS | Mod: 25,S$GLB,, | Performed by: PODIATRIST

## 2021-01-20 PROCEDURE — 99213 OFFICE O/P EST LOW 20 MIN: CPT | Mod: 25,S$GLB,, | Performed by: PODIATRIST

## 2021-01-21 ENCOUNTER — TELEPHONE (OUTPATIENT)
Dept: INFECTIOUS DISEASES | Facility: CLINIC | Age: 56
End: 2021-01-21

## 2021-01-22 ENCOUNTER — TELEPHONE (OUTPATIENT)
Dept: PODIATRY | Facility: CLINIC | Age: 56
End: 2021-01-22

## 2021-01-26 ENCOUNTER — TELEPHONE (OUTPATIENT)
Dept: INFECTIOUS DISEASES | Facility: CLINIC | Age: 56
End: 2021-01-26

## 2021-01-27 ENCOUNTER — TELEPHONE (OUTPATIENT)
Dept: INTERNAL MEDICINE | Facility: CLINIC | Age: 56
End: 2021-01-27

## 2021-01-27 ENCOUNTER — PATIENT MESSAGE (OUTPATIENT)
Dept: INTERNAL MEDICINE | Facility: CLINIC | Age: 56
End: 2021-01-27

## 2021-01-28 ENCOUNTER — APPOINTMENT (OUTPATIENT)
Dept: LAB | Facility: HOSPITAL | Age: 56
End: 2021-01-28
Attending: NURSE PRACTITIONER
Payer: COMMERCIAL

## 2021-01-31 ENCOUNTER — TELEPHONE (OUTPATIENT)
Dept: INFECTIOUS DISEASES | Facility: HOSPITAL | Age: 56
End: 2021-01-31

## 2021-02-01 ENCOUNTER — OFFICE VISIT (OUTPATIENT)
Dept: INFECTIOUS DISEASES | Facility: CLINIC | Age: 56
End: 2021-02-01
Payer: COMMERCIAL

## 2021-02-01 ENCOUNTER — INFUSION (OUTPATIENT)
Dept: INFECTIOUS DISEASES | Facility: HOSPITAL | Age: 56
End: 2021-02-01
Attending: INTERNAL MEDICINE

## 2021-02-01 VITALS
SYSTOLIC BLOOD PRESSURE: 117 MMHG | WEIGHT: 248.25 LBS | DIASTOLIC BLOOD PRESSURE: 71 MMHG | BODY MASS INDEX: 31.86 KG/M2 | HEART RATE: 55 BPM | HEIGHT: 74 IN | TEMPERATURE: 98 F

## 2021-02-01 DIAGNOSIS — Z45.2 PIC LINE (PERIPHERALLY INSERTED CENTRAL CATHETER) REMOVAL: Primary | ICD-10-CM

## 2021-02-01 PROCEDURE — 99999 PR PBB SHADOW E&M-EST. PATIENT-LVL III: CPT | Mod: PBBFAC,,, | Performed by: PHYSICIAN ASSISTANT

## 2021-02-01 PROCEDURE — 99999 PR PBB SHADOW E&M-EST. PATIENT-LVL III: ICD-10-PCS | Mod: PBBFAC,,, | Performed by: PHYSICIAN ASSISTANT

## 2021-02-01 PROCEDURE — 99213 OFFICE O/P EST LOW 20 MIN: CPT | Mod: S$GLB,,, | Performed by: PHYSICIAN ASSISTANT

## 2021-02-01 PROCEDURE — 99213 PR OFFICE/OUTPT VISIT, EST, LEVL III, 20-29 MIN: ICD-10-PCS | Mod: S$GLB,,, | Performed by: PHYSICIAN ASSISTANT

## 2021-02-02 ENCOUNTER — OFFICE VISIT (OUTPATIENT)
Dept: PODIATRY | Facility: CLINIC | Age: 56
End: 2021-02-02
Payer: COMMERCIAL

## 2021-02-02 ENCOUNTER — TELEPHONE (OUTPATIENT)
Dept: PODIATRY | Facility: CLINIC | Age: 56
End: 2021-02-02

## 2021-02-02 VITALS
SYSTOLIC BLOOD PRESSURE: 150 MMHG | HEART RATE: 69 BPM | WEIGHT: 248 LBS | BODY MASS INDEX: 31.83 KG/M2 | HEIGHT: 74 IN | DIASTOLIC BLOOD PRESSURE: 79 MMHG

## 2021-02-02 DIAGNOSIS — L97.502 ULCER OF FOOT WITH FAT LAYER EXPOSED, UNSPECIFIED LATERALITY: Primary | ICD-10-CM

## 2021-02-02 PROCEDURE — 99212 PR OFFICE/OUTPT VISIT, EST, LEVL II, 10-19 MIN: ICD-10-PCS | Mod: 25,S$GLB,, | Performed by: PODIATRIST

## 2021-02-02 PROCEDURE — 99212 OFFICE O/P EST SF 10 MIN: CPT | Mod: 25,S$GLB,, | Performed by: PODIATRIST

## 2021-02-02 PROCEDURE — 11042 WOUND DEBRIDEMENT: ICD-10-PCS | Mod: S$GLB,,, | Performed by: PODIATRIST

## 2021-02-02 PROCEDURE — 99999 PR PBB SHADOW E&M-EST. PATIENT-LVL IV: ICD-10-PCS | Mod: PBBFAC,,, | Performed by: PODIATRIST

## 2021-02-02 PROCEDURE — 11042 DBRDMT SUBQ TIS 1ST 20SQCM/<: CPT | Mod: PBBFAC,PN | Performed by: PODIATRIST

## 2021-02-02 PROCEDURE — 99999 PR PBB SHADOW E&M-EST. PATIENT-LVL IV: CPT | Mod: PBBFAC,,, | Performed by: PODIATRIST

## 2021-02-02 PROCEDURE — 99214 OFFICE O/P EST MOD 30 MIN: CPT | Mod: PBBFAC,25,PN | Performed by: PODIATRIST

## 2021-02-08 RX ORDER — BLOOD-GLUCOSE SENSOR
1 EACH MISCELLANEOUS
Qty: 5 EACH | Refills: 12 | Status: SHIPPED | OUTPATIENT
Start: 2021-02-08 | End: 2022-02-09

## 2021-02-08 RX ORDER — BLOOD-GLUCOSE TRANSMITTER
1 EACH MISCELLANEOUS CONTINUOUS
Qty: 1 DEVICE | Refills: 0 | Status: SHIPPED | OUTPATIENT
Start: 2021-02-08 | End: 2022-02-09

## 2021-02-10 ENCOUNTER — CLINICAL SUPPORT (OUTPATIENT)
Dept: DIABETES | Facility: CLINIC | Age: 56
End: 2021-02-10
Payer: COMMERCIAL

## 2021-02-10 ENCOUNTER — PATIENT OUTREACH (OUTPATIENT)
Dept: ADMINISTRATIVE | Facility: OTHER | Age: 56
End: 2021-02-10

## 2021-02-10 DIAGNOSIS — Z79.4 TYPE 2 DIABETES MELLITUS WITH BOTH EYES AFFECTED BY MODERATE NONPROLIFERATIVE RETINOPATHY WITHOUT MACULAR EDEMA, WITH LONG-TERM CURRENT USE OF INSULIN: ICD-10-CM

## 2021-02-10 DIAGNOSIS — E11.3393 TYPE 2 DIABETES MELLITUS WITH BOTH EYES AFFECTED BY MODERATE NONPROLIFERATIVE RETINOPATHY WITHOUT MACULAR EDEMA, WITH LONG-TERM CURRENT USE OF INSULIN: ICD-10-CM

## 2021-02-10 PROCEDURE — G0108 DIAB MANAGE TRN  PER INDIV: HCPCS | Mod: S$GLB,,, | Performed by: DIETITIAN, REGISTERED

## 2021-02-10 PROCEDURE — G0108 PR DIAB MANAGE TRN  PER INDIV: ICD-10-PCS | Mod: S$GLB,,, | Performed by: DIETITIAN, REGISTERED

## 2021-02-11 DIAGNOSIS — E11.65 UNCONTROLLED TYPE 2 DIABETES MELLITUS WITH HYPERGLYCEMIA: Primary | ICD-10-CM

## 2021-02-11 RX ORDER — LANCETS
1 EACH MISCELLANEOUS DAILY
Qty: 30 EACH | Refills: 11 | Status: SHIPPED | OUTPATIENT
Start: 2021-02-11

## 2021-02-11 RX ORDER — BLOOD SUGAR DIAGNOSTIC
1 STRIP MISCELLANEOUS
Qty: 150 EACH | Refills: 11 | Status: SHIPPED | OUTPATIENT
Start: 2021-02-11 | End: 2023-01-11 | Stop reason: SDUPTHER

## 2021-02-18 ENCOUNTER — PATIENT MESSAGE (OUTPATIENT)
Dept: PODIATRY | Facility: CLINIC | Age: 56
End: 2021-02-18

## 2021-02-18 ENCOUNTER — TELEPHONE (OUTPATIENT)
Dept: PODIATRY | Facility: CLINIC | Age: 56
End: 2021-02-18

## 2021-02-18 ENCOUNTER — OFFICE VISIT (OUTPATIENT)
Dept: PODIATRY | Facility: CLINIC | Age: 56
End: 2021-02-18
Payer: COMMERCIAL

## 2021-02-18 VITALS
HEART RATE: 54 BPM | DIASTOLIC BLOOD PRESSURE: 77 MMHG | SYSTOLIC BLOOD PRESSURE: 160 MMHG | BODY MASS INDEX: 31.83 KG/M2 | WEIGHT: 248 LBS | HEIGHT: 74 IN

## 2021-02-18 DIAGNOSIS — L97.502 ULCER OF FOOT WITH FAT LAYER EXPOSED, UNSPECIFIED LATERALITY: Primary | ICD-10-CM

## 2021-02-18 DIAGNOSIS — Z79.4 TYPE 2 DIABETES MELLITUS WITH BOTH EYES AFFECTED BY MODERATE NONPROLIFERATIVE RETINOPATHY WITHOUT MACULAR EDEMA, WITH LONG-TERM CURRENT USE OF INSULIN: ICD-10-CM

## 2021-02-18 DIAGNOSIS — Z98.890 POST-OPERATIVE STATE: ICD-10-CM

## 2021-02-18 DIAGNOSIS — E11.3393 TYPE 2 DIABETES MELLITUS WITH BOTH EYES AFFECTED BY MODERATE NONPROLIFERATIVE RETINOPATHY WITHOUT MACULAR EDEMA, WITH LONG-TERM CURRENT USE OF INSULIN: ICD-10-CM

## 2021-02-18 PROCEDURE — 11042 DBRDMT SUBQ TIS 1ST 20SQCM/<: CPT | Mod: PBBFAC,PN | Performed by: PODIATRIST

## 2021-02-18 PROCEDURE — 99499 UNLISTED E&M SERVICE: CPT | Mod: S$GLB,,, | Performed by: PODIATRIST

## 2021-02-18 PROCEDURE — 99999 PR PBB SHADOW E&M-EST. PATIENT-LVL IV: ICD-10-PCS | Mod: PBBFAC,,, | Performed by: PODIATRIST

## 2021-02-18 PROCEDURE — 99999 PR PBB SHADOW E&M-EST. PATIENT-LVL IV: CPT | Mod: PBBFAC,,, | Performed by: PODIATRIST

## 2021-02-18 PROCEDURE — 99499 NO LOS: ICD-10-PCS | Mod: S$GLB,,, | Performed by: PODIATRIST

## 2021-02-18 PROCEDURE — 99214 OFFICE O/P EST MOD 30 MIN: CPT | Mod: PBBFAC,25,PN | Performed by: PODIATRIST

## 2021-02-18 PROCEDURE — 11042 WOUND DEBRIDEMENT: ICD-10-PCS | Mod: S$GLB,,, | Performed by: PODIATRIST

## 2021-02-23 ENCOUNTER — TELEPHONE (OUTPATIENT)
Dept: INTERNAL MEDICINE | Facility: CLINIC | Age: 56
End: 2021-02-23

## 2021-03-07 ENCOUNTER — HOSPITAL ENCOUNTER (EMERGENCY)
Facility: HOSPITAL | Age: 56
Discharge: HOME | End: 2021-03-07
Attending: EMERGENCY MEDICINE
Payer: COMMERCIAL

## 2021-03-07 VITALS
HEIGHT: 74 IN | TEMPERATURE: 98 F | BODY MASS INDEX: 31.44 KG/M2 | HEART RATE: 64 BPM | DIASTOLIC BLOOD PRESSURE: 98 MMHG | SYSTOLIC BLOOD PRESSURE: 139 MMHG | WEIGHT: 245 LBS | RESPIRATION RATE: 18 BRPM | OXYGEN SATURATION: 100 %

## 2021-03-07 DIAGNOSIS — L97.412 DIABETIC ULCER OF RIGHT MIDFOOT ASSOCIATED WITH TYPE 2 DIABETES MELLITUS, WITH FAT LAYER EXPOSED: Primary | ICD-10-CM

## 2021-03-07 DIAGNOSIS — E11.621 DIABETIC ULCER OF RIGHT MIDFOOT ASSOCIATED WITH TYPE 2 DIABETES MELLITUS, WITH FAT LAYER EXPOSED: Primary | ICD-10-CM

## 2021-03-07 PROCEDURE — 99284 PR EMERGENCY DEPT VISIT,LEVEL IV: ICD-10-PCS | Mod: ,,, | Performed by: EMERGENCY MEDICINE

## 2021-03-07 PROCEDURE — 25000003 PHARM REV CODE 250: Performed by: EMERGENCY MEDICINE

## 2021-03-07 PROCEDURE — 99284 EMERGENCY DEPT VISIT MOD MDM: CPT | Mod: ,,, | Performed by: EMERGENCY MEDICINE

## 2021-03-07 PROCEDURE — 99283 EMERGENCY DEPT VISIT LOW MDM: CPT

## 2021-03-07 RX ORDER — CEPHALEXIN 500 MG/1
500 CAPSULE ORAL EVERY 6 HOURS
Qty: 40 CAPSULE | Refills: 0 | Status: SHIPPED | OUTPATIENT
Start: 2021-03-07 | End: 2021-03-17

## 2021-03-07 RX ORDER — CEPHALEXIN 500 MG/1
500 CAPSULE ORAL
Status: COMPLETED | OUTPATIENT
Start: 2021-03-07 | End: 2021-03-07

## 2021-03-07 RX ADMIN — CEPHALEXIN 500 MG: 500 CAPSULE ORAL at 09:03

## 2021-03-15 ENCOUNTER — TELEPHONE (OUTPATIENT)
Dept: PODIATRY | Facility: CLINIC | Age: 56
End: 2021-03-15

## 2021-03-15 ENCOUNTER — OFFICE VISIT (OUTPATIENT)
Dept: PODIATRY | Facility: CLINIC | Age: 56
End: 2021-03-15
Payer: COMMERCIAL

## 2021-03-15 VITALS
HEIGHT: 74 IN | WEIGHT: 245 LBS | DIASTOLIC BLOOD PRESSURE: 70 MMHG | HEART RATE: 56 BPM | SYSTOLIC BLOOD PRESSURE: 146 MMHG | BODY MASS INDEX: 31.44 KG/M2

## 2021-03-15 DIAGNOSIS — E11.49 TYPE II DIABETES MELLITUS WITH NEUROLOGICAL MANIFESTATIONS: ICD-10-CM

## 2021-03-15 DIAGNOSIS — L97.502 ULCER OF FOOT WITH FAT LAYER EXPOSED, UNSPECIFIED LATERALITY: ICD-10-CM

## 2021-03-15 DIAGNOSIS — L97.512 ULCER OF RIGHT FOOT WITH FAT LAYER EXPOSED: Primary | ICD-10-CM

## 2021-03-15 PROCEDURE — 99214 OFFICE O/P EST MOD 30 MIN: CPT | Mod: PBBFAC,PN | Performed by: PODIATRIST

## 2021-03-15 PROCEDURE — 11042 DBRDMT SUBQ TIS 1ST 20SQCM/<: CPT | Mod: PBBFAC,PN | Performed by: PODIATRIST

## 2021-03-15 PROCEDURE — 11042 WOUND DEBRIDEMENT: ICD-10-PCS | Mod: S$GLB,,, | Performed by: PODIATRIST

## 2021-03-15 PROCEDURE — 99213 PR OFFICE/OUTPT VISIT, EST, LEVL III, 20-29 MIN: ICD-10-PCS | Mod: 25,S$GLB,, | Performed by: PODIATRIST

## 2021-03-15 PROCEDURE — 99999 PR PBB SHADOW E&M-EST. PATIENT-LVL IV: ICD-10-PCS | Mod: PBBFAC,,, | Performed by: PODIATRIST

## 2021-03-15 PROCEDURE — 99999 PR PBB SHADOW E&M-EST. PATIENT-LVL IV: CPT | Mod: PBBFAC,,, | Performed by: PODIATRIST

## 2021-03-15 PROCEDURE — 99213 OFFICE O/P EST LOW 20 MIN: CPT | Mod: 25,S$GLB,, | Performed by: PODIATRIST

## 2021-03-16 NOTE — TELEPHONE ENCOUNTER
----- Message from Debbi Valero sent at 5/28/2019 11:26 AM CDT -----  Contact: Pt self Mobile [phone] or Home [phone]  Patient is calling in regards to his insurance company [employer] who he said is no longer covering his insulin (LANTUS SOLOSTAR U-100 INSULIN) glargine 100 units/mL (3mL) SubQ pen until August first. He would like a call back to speak with you about this please because he said that he will be out of insulin for two months and he does not have anymore of his medication.    SPIRITUAL CARE PROGRESS NOTE    Spiritual Assessment:  engaged patient's daughter, Duc Dias, through a telephone call as part of spiritual care rounding. Patient's daughter shared she and family were coping despite patient's hospitalization. Daughter shared she is providing emotional support to patient and she (daughter) has emotional support available to her as well. Intervention:   provided patient's daughter with spiritual support while engaging patient's daughter through active listening. Outcome:  Chaplains are available for specific request visits at any time and may be paged via [facility].        Electronically signed by Valentín Zavala on 3/16/2021 at 3:42 PM.  [place]

## 2021-03-22 ENCOUNTER — OFFICE VISIT (OUTPATIENT)
Dept: PODIATRY | Facility: CLINIC | Age: 56
End: 2021-03-22
Payer: COMMERCIAL

## 2021-03-22 VITALS
BODY MASS INDEX: 31.45 KG/M2 | HEART RATE: 59 BPM | WEIGHT: 244.94 LBS | DIASTOLIC BLOOD PRESSURE: 60 MMHG | SYSTOLIC BLOOD PRESSURE: 134 MMHG

## 2021-03-22 DIAGNOSIS — L97.512 ULCER OF RIGHT FOOT WITH FAT LAYER EXPOSED: Primary | ICD-10-CM

## 2021-03-22 DIAGNOSIS — E11.49 TYPE II DIABETES MELLITUS WITH NEUROLOGICAL MANIFESTATIONS: ICD-10-CM

## 2021-03-22 DIAGNOSIS — L97.502 ULCER OF FOOT WITH FAT LAYER EXPOSED, UNSPECIFIED LATERALITY: ICD-10-CM

## 2021-03-22 PROCEDURE — 11042 DBRDMT SUBQ TIS 1ST 20SQCM/<: CPT | Mod: PBBFAC | Performed by: PODIATRIST

## 2021-03-22 PROCEDURE — 99999 PR PBB SHADOW E&M-EST. PATIENT-LVL IV: ICD-10-PCS | Mod: PBBFAC,,, | Performed by: PODIATRIST

## 2021-03-22 PROCEDURE — 99999 PR PBB SHADOW E&M-EST. PATIENT-LVL IV: CPT | Mod: PBBFAC,,, | Performed by: PODIATRIST

## 2021-03-22 PROCEDURE — 11042 DBRDMT SUBQ TIS 1ST 20SQCM/<: CPT | Mod: S$GLB,,, | Performed by: PODIATRIST

## 2021-03-22 PROCEDURE — 99214 OFFICE O/P EST MOD 30 MIN: CPT | Mod: PBBFAC | Performed by: PODIATRIST

## 2021-03-22 PROCEDURE — 99213 OFFICE O/P EST LOW 20 MIN: CPT | Mod: 25,S$GLB,, | Performed by: PODIATRIST

## 2021-03-22 PROCEDURE — 99213 PR OFFICE/OUTPT VISIT, EST, LEVL III, 20-29 MIN: ICD-10-PCS | Mod: 25,S$GLB,, | Performed by: PODIATRIST

## 2021-03-22 PROCEDURE — 11042 PR DEBRIDEMENT, SKIN, SUB-Q TISSUE,=<20 SQ CM: ICD-10-PCS | Mod: S$GLB,,, | Performed by: PODIATRIST

## 2021-03-22 RX ORDER — CIPROFLOXACIN 500 MG/1
500 TABLET ORAL 2 TIMES DAILY
Qty: 20 TABLET | Refills: 0 | Status: SHIPPED | OUTPATIENT
Start: 2021-03-22 | End: 2021-04-20

## 2021-03-29 ENCOUNTER — IMMUNIZATION (OUTPATIENT)
Dept: PRIMARY CARE CLINIC | Facility: CLINIC | Age: 56
End: 2021-03-29
Payer: COMMERCIAL

## 2021-03-29 DIAGNOSIS — Z23 NEED FOR VACCINATION: Primary | ICD-10-CM

## 2021-03-30 PROCEDURE — 0011A COVID-19, MRNA, LNP-S, PF, 100 MCG/0.5 ML DOSE VACCINE: CPT | Mod: CV19,S$GLB,, | Performed by: INTERNAL MEDICINE

## 2021-03-30 PROCEDURE — 91301 COVID-19, MRNA, LNP-S, PF, 100 MCG/0.5 ML DOSE VACCINE: ICD-10-PCS | Mod: S$GLB,,, | Performed by: INTERNAL MEDICINE

## 2021-03-30 PROCEDURE — 91301 COVID-19, MRNA, LNP-S, PF, 100 MCG/0.5 ML DOSE VACCINE: CPT | Mod: S$GLB,,, | Performed by: INTERNAL MEDICINE

## 2021-03-30 PROCEDURE — 0011A COVID-19, MRNA, LNP-S, PF, 100 MCG/0.5 ML DOSE VACCINE: ICD-10-PCS | Mod: CV19,S$GLB,, | Performed by: INTERNAL MEDICINE

## 2021-04-05 ENCOUNTER — TELEPHONE (OUTPATIENT)
Dept: PODIATRY | Facility: CLINIC | Age: 56
End: 2021-04-05

## 2021-04-05 ENCOUNTER — OFFICE VISIT (OUTPATIENT)
Dept: PODIATRY | Facility: CLINIC | Age: 56
End: 2021-04-05
Payer: COMMERCIAL

## 2021-04-05 ENCOUNTER — PATIENT MESSAGE (OUTPATIENT)
Dept: ADMINISTRATIVE | Facility: HOSPITAL | Age: 56
End: 2021-04-05

## 2021-04-05 VITALS
WEIGHT: 244.94 LBS | HEART RATE: 54 BPM | SYSTOLIC BLOOD PRESSURE: 142 MMHG | DIASTOLIC BLOOD PRESSURE: 75 MMHG | BODY MASS INDEX: 31.45 KG/M2

## 2021-04-05 DIAGNOSIS — L97.512 ULCER OF RIGHT FOOT WITH FAT LAYER EXPOSED: Primary | ICD-10-CM

## 2021-04-05 DIAGNOSIS — L97.502 ULCER OF FOOT WITH FAT LAYER EXPOSED, UNSPECIFIED LATERALITY: ICD-10-CM

## 2021-04-05 DIAGNOSIS — E11.49 TYPE II DIABETES MELLITUS WITH NEUROLOGICAL MANIFESTATIONS: ICD-10-CM

## 2021-04-05 DIAGNOSIS — L97.522 CHRONIC FOOT ULCER WITH FAT LAYER EXPOSED, LEFT: ICD-10-CM

## 2021-04-05 DIAGNOSIS — M86.072 ACUTE HEMATOGENOUS OSTEOMYELITIS OF LEFT FOOT: ICD-10-CM

## 2021-04-05 PROCEDURE — 99213 PR OFFICE/OUTPT VISIT, EST, LEVL III, 20-29 MIN: ICD-10-PCS | Mod: 57,25,S$GLB, | Performed by: PODIATRIST

## 2021-04-05 PROCEDURE — 87076 CULTURE ANAEROBE IDENT EACH: CPT | Performed by: PODIATRIST

## 2021-04-05 PROCEDURE — 88311 DECALCIFY TISSUE: CPT | Mod: 26,,, | Performed by: PATHOLOGY

## 2021-04-05 PROCEDURE — 99213 OFFICE O/P EST LOW 20 MIN: CPT | Mod: 57,25,S$GLB, | Performed by: PODIATRIST

## 2021-04-05 PROCEDURE — 87186 SC STD MICRODIL/AGAR DIL: CPT | Performed by: PODIATRIST

## 2021-04-05 PROCEDURE — 87070 CULTURE OTHR SPECIMN AEROBIC: CPT | Performed by: PODIATRIST

## 2021-04-05 PROCEDURE — 88311 PR  DECALCIFY TISSUE: ICD-10-PCS | Mod: 26,,, | Performed by: PATHOLOGY

## 2021-04-05 PROCEDURE — 99999 PR PBB SHADOW E&M-EST. PATIENT-LVL III: CPT | Mod: PBBFAC,,, | Performed by: PODIATRIST

## 2021-04-05 PROCEDURE — 88311 DECALCIFY TISSUE: CPT | Performed by: PATHOLOGY

## 2021-04-05 PROCEDURE — 11042 PR DEBRIDEMENT, SKIN, SUB-Q TISSUE,=<20 SQ CM: ICD-10-PCS | Mod: 59,S$GLB,, | Performed by: PODIATRIST

## 2021-04-05 PROCEDURE — 87077 CULTURE AEROBIC IDENTIFY: CPT | Performed by: PODIATRIST

## 2021-04-05 PROCEDURE — 88307 PR  SURG PATH,LEVEL V: ICD-10-PCS | Mod: 26,,, | Performed by: PATHOLOGY

## 2021-04-05 PROCEDURE — 99999 PR PBB SHADOW E&M-EST. PATIENT-LVL III: ICD-10-PCS | Mod: PBBFAC,,, | Performed by: PODIATRIST

## 2021-04-05 PROCEDURE — 88307 TISSUE EXAM BY PATHOLOGIST: CPT | Mod: 26,,, | Performed by: PATHOLOGY

## 2021-04-05 PROCEDURE — 11042 DBRDMT SUBQ TIS 1ST 20SQCM/<: CPT | Mod: 59,S$GLB,, | Performed by: PODIATRIST

## 2021-04-05 PROCEDURE — 28122 PR PART REMV OTHR TARSAL/METATARSAL: ICD-10-PCS | Mod: LT,S$GLB,, | Performed by: PODIATRIST

## 2021-04-05 PROCEDURE — 28122 PARTIAL REMOVAL OF FOOT BONE: CPT | Mod: LT,S$GLB,, | Performed by: PODIATRIST

## 2021-04-05 PROCEDURE — 87075 CULTR BACTERIA EXCEPT BLOOD: CPT | Performed by: PODIATRIST

## 2021-04-05 PROCEDURE — 88307 TISSUE EXAM BY PATHOLOGIST: CPT | Performed by: PATHOLOGY

## 2021-04-05 RX ORDER — CIPROFLOXACIN 500 MG/1
500 TABLET ORAL 2 TIMES DAILY
Qty: 20 TABLET | Refills: 0 | Status: SHIPPED | OUTPATIENT
Start: 2021-04-05 | End: 2021-04-20

## 2021-04-05 RX ORDER — CLINDAMYCIN HYDROCHLORIDE 300 MG/1
300 CAPSULE ORAL 3 TIMES DAILY
Qty: 30 CAPSULE | Refills: 0 | Status: SHIPPED | OUTPATIENT
Start: 2021-04-05 | End: 2021-04-15

## 2021-04-08 LAB
BACTERIA SPEC AEROBE CULT: ABNORMAL

## 2021-04-09 LAB
BACTERIA SPEC ANAEROBE CULT: ABNORMAL
BACTERIA SPEC ANAEROBE CULT: ABNORMAL

## 2021-04-15 LAB
FINAL PATHOLOGIC DIAGNOSIS: NORMAL
GROSS: NORMAL
Lab: NORMAL

## 2021-04-19 ENCOUNTER — TELEPHONE (OUTPATIENT)
Dept: PODIATRY | Facility: CLINIC | Age: 56
End: 2021-04-19

## 2021-04-19 ENCOUNTER — OFFICE VISIT (OUTPATIENT)
Dept: PODIATRY | Facility: CLINIC | Age: 56
End: 2021-04-19
Payer: COMMERCIAL

## 2021-04-19 VITALS
BODY MASS INDEX: 31.45 KG/M2 | SYSTOLIC BLOOD PRESSURE: 145 MMHG | WEIGHT: 244.94 LBS | HEART RATE: 56 BPM | DIASTOLIC BLOOD PRESSURE: 71 MMHG

## 2021-04-19 DIAGNOSIS — M86.072 ACUTE HEMATOGENOUS OSTEOMYELITIS OF LEFT FOOT: Primary | ICD-10-CM

## 2021-04-19 DIAGNOSIS — L97.403 DIABETIC ULCER OF HEEL ASSOCIATED WITH TYPE 2 DIABETES MELLITUS, WITH NECROSIS OF MUSCLE, UNSPECIFIED LATERALITY: ICD-10-CM

## 2021-04-19 DIAGNOSIS — L97.502 ULCER OF FOOT WITH FAT LAYER EXPOSED, UNSPECIFIED LATERALITY: ICD-10-CM

## 2021-04-19 DIAGNOSIS — E11.621 DIABETIC ULCER OF HEEL ASSOCIATED WITH TYPE 2 DIABETES MELLITUS, WITH NECROSIS OF MUSCLE, UNSPECIFIED LATERALITY: ICD-10-CM

## 2021-04-19 PROCEDURE — 99999 PR PBB SHADOW E&M-EST. PATIENT-LVL IV: CPT | Mod: PBBFAC,,, | Performed by: PODIATRIST

## 2021-04-19 PROCEDURE — 99024 PR POST-OP FOLLOW-UP VISIT: ICD-10-PCS | Mod: S$GLB,,, | Performed by: PODIATRIST

## 2021-04-19 PROCEDURE — 99024 POSTOP FOLLOW-UP VISIT: CPT | Mod: S$GLB,,, | Performed by: PODIATRIST

## 2021-04-19 PROCEDURE — 99999 PR PBB SHADOW E&M-EST. PATIENT-LVL IV: ICD-10-PCS | Mod: PBBFAC,,, | Performed by: PODIATRIST

## 2021-04-20 ENCOUNTER — OFFICE VISIT (OUTPATIENT)
Dept: INFECTIOUS DISEASES | Facility: CLINIC | Age: 56
End: 2021-04-20
Payer: COMMERCIAL

## 2021-04-20 ENCOUNTER — PATIENT OUTREACH (OUTPATIENT)
Dept: ADMINISTRATIVE | Facility: OTHER | Age: 56
End: 2021-04-20

## 2021-04-20 VITALS
WEIGHT: 255.94 LBS | SYSTOLIC BLOOD PRESSURE: 136 MMHG | BODY MASS INDEX: 32.85 KG/M2 | HEART RATE: 51 BPM | TEMPERATURE: 98 F | HEIGHT: 74 IN | DIASTOLIC BLOOD PRESSURE: 79 MMHG

## 2021-04-20 DIAGNOSIS — Z79.4 TYPE 2 DIABETES MELLITUS WITHOUT COMPLICATION, WITH LONG-TERM CURRENT USE OF INSULIN: Primary | ICD-10-CM

## 2021-04-20 DIAGNOSIS — A49.8 PSEUDOMONAS INFECTION: ICD-10-CM

## 2021-04-20 DIAGNOSIS — M86.072 ACUTE HEMATOGENOUS OSTEOMYELITIS OF LEFT FOOT: ICD-10-CM

## 2021-04-20 DIAGNOSIS — A49.8 INFECTION DUE TO ENTEROBACTER CLOACAE: ICD-10-CM

## 2021-04-20 DIAGNOSIS — A49.01 MSSA (METHICILLIN SUSCEPTIBLE STAPHYLOCOCCUS AUREUS): Primary | ICD-10-CM

## 2021-04-20 DIAGNOSIS — E11.9 TYPE 2 DIABETES MELLITUS WITHOUT COMPLICATION, WITH LONG-TERM CURRENT USE OF INSULIN: Primary | ICD-10-CM

## 2021-04-20 DIAGNOSIS — A49.8 ANAEROBIC BACTERIAL INFECTION: ICD-10-CM

## 2021-04-20 PROCEDURE — 99214 PR OFFICE/OUTPT VISIT, EST, LEVL IV, 30-39 MIN: ICD-10-PCS | Mod: S$GLB,,, | Performed by: INTERNAL MEDICINE

## 2021-04-20 PROCEDURE — 99999 PR PBB SHADOW E&M-EST. PATIENT-LVL V: ICD-10-PCS | Mod: PBBFAC,,, | Performed by: INTERNAL MEDICINE

## 2021-04-20 PROCEDURE — 99214 OFFICE O/P EST MOD 30 MIN: CPT | Mod: S$GLB,,, | Performed by: INTERNAL MEDICINE

## 2021-04-20 PROCEDURE — 99999 PR PBB SHADOW E&M-EST. PATIENT-LVL V: CPT | Mod: PBBFAC,,, | Performed by: INTERNAL MEDICINE

## 2021-04-20 RX ORDER — METRONIDAZOLE 500 MG/1
500 TABLET ORAL 3 TIMES DAILY
Qty: 126 TABLET | Refills: 0 | Status: SHIPPED | OUTPATIENT
Start: 2021-04-20 | End: 2021-05-25 | Stop reason: SDUPTHER

## 2021-04-20 RX ORDER — CIPROFLOXACIN 750 MG/1
750 TABLET, FILM COATED ORAL EVERY 12 HOURS
Qty: 84 TABLET | Refills: 0 | Status: SHIPPED | OUTPATIENT
Start: 2021-04-20 | End: 2021-05-25 | Stop reason: SDUPTHER

## 2021-04-20 RX ORDER — DOXYCYCLINE 100 MG/1
100 CAPSULE ORAL 2 TIMES DAILY
Qty: 84 CAPSULE | Refills: 0 | Status: SHIPPED | OUTPATIENT
Start: 2021-04-20 | End: 2021-05-25 | Stop reason: SDUPTHER

## 2021-04-28 ENCOUNTER — IMMUNIZATION (OUTPATIENT)
Dept: PRIMARY CARE CLINIC | Facility: CLINIC | Age: 56
End: 2021-04-28
Payer: COMMERCIAL

## 2021-04-28 DIAGNOSIS — Z23 NEED FOR VACCINATION: Primary | ICD-10-CM

## 2021-04-28 PROCEDURE — 91301 PR SARS-COV-2 COVID-19 VACCINE, NO PRSV, 100MCG/0.5ML, IM: ICD-10-PCS | Mod: S$GLB,,, | Performed by: INTERNAL MEDICINE

## 2021-04-28 PROCEDURE — 0012A PR IMMUNIZ ADMIN, SARS-COV-2 COVID-19 VACC, 100MCG/0.5ML, 2ND DOSE: CPT | Mod: CV19,S$GLB,, | Performed by: INTERNAL MEDICINE

## 2021-04-28 PROCEDURE — 0012A PR IMMUNIZ ADMIN, SARS-COV-2 COVID-19 VACC, 100MCG/0.5ML, 2ND DOSE: ICD-10-PCS | Mod: CV19,S$GLB,, | Performed by: INTERNAL MEDICINE

## 2021-04-28 PROCEDURE — 91301 PR SARS-COV-2 COVID-19 VACCINE, NO PRSV, 100MCG/0.5ML, IM: CPT | Mod: S$GLB,,, | Performed by: INTERNAL MEDICINE

## 2021-04-28 RX ADMIN — Medication 0.5 ML: at 11:04

## 2021-04-30 ENCOUNTER — PATIENT OUTREACH (OUTPATIENT)
Dept: ADMINISTRATIVE | Facility: HOSPITAL | Age: 56
End: 2021-04-30

## 2021-05-11 ENCOUNTER — OFFICE VISIT (OUTPATIENT)
Dept: INFECTIOUS DISEASES | Facility: CLINIC | Age: 56
End: 2021-05-11
Payer: COMMERCIAL

## 2021-05-11 ENCOUNTER — OFFICE VISIT (OUTPATIENT)
Dept: PODIATRY | Facility: CLINIC | Age: 56
End: 2021-05-11
Payer: COMMERCIAL

## 2021-05-11 VITALS
WEIGHT: 258.81 LBS | BODY MASS INDEX: 33.21 KG/M2 | HEIGHT: 74 IN | HEART RATE: 50 BPM | SYSTOLIC BLOOD PRESSURE: 130 MMHG | DIASTOLIC BLOOD PRESSURE: 70 MMHG

## 2021-05-11 DIAGNOSIS — L08.9 DIABETIC FOOT INFECTION: ICD-10-CM

## 2021-05-11 DIAGNOSIS — M86.9 OSTEOMYELITIS OF LEFT FOOT, UNSPECIFIED TYPE: Primary | ICD-10-CM

## 2021-05-11 DIAGNOSIS — L97.522 ULCER OF LEFT FOOT WITH FAT LAYER EXPOSED: ICD-10-CM

## 2021-05-11 DIAGNOSIS — E11.628 DIABETIC FOOT INFECTION: ICD-10-CM

## 2021-05-11 DIAGNOSIS — A49.9 POLYMICROBIAL BACTERIAL INFECTION: ICD-10-CM

## 2021-05-11 DIAGNOSIS — L97.512 ULCER OF RIGHT FOOT WITH FAT LAYER EXPOSED: Primary | ICD-10-CM

## 2021-05-11 PROCEDURE — 99999 PR PBB SHADOW E&M-EST. PATIENT-LVL IV: ICD-10-PCS | Mod: PBBFAC,,, | Performed by: PODIATRIST

## 2021-05-11 PROCEDURE — 99499 UNLISTED E&M SERVICE: CPT | Mod: S$GLB,,, | Performed by: PODIATRIST

## 2021-05-11 PROCEDURE — 11042 DBRDMT SUBQ TIS 1ST 20SQCM/<: CPT | Mod: 79,S$GLB,, | Performed by: PODIATRIST

## 2021-05-11 PROCEDURE — 99999 PR PBB SHADOW E&M-EST. PATIENT-LVL III: ICD-10-PCS | Mod: PBBFAC,,, | Performed by: PHYSICIAN ASSISTANT

## 2021-05-11 PROCEDURE — 99499 NO LOS: ICD-10-PCS | Mod: S$GLB,,, | Performed by: PODIATRIST

## 2021-05-11 PROCEDURE — 99214 PR OFFICE/OUTPT VISIT, EST, LEVL IV, 30-39 MIN: ICD-10-PCS | Mod: S$GLB,,, | Performed by: PHYSICIAN ASSISTANT

## 2021-05-11 PROCEDURE — 99214 OFFICE O/P EST MOD 30 MIN: CPT | Mod: S$GLB,,, | Performed by: PHYSICIAN ASSISTANT

## 2021-05-11 PROCEDURE — 11042 PR DEBRIDEMENT, SKIN, SUB-Q TISSUE,=<20 SQ CM: ICD-10-PCS | Mod: 79,S$GLB,, | Performed by: PODIATRIST

## 2021-05-11 PROCEDURE — 99999 PR PBB SHADOW E&M-EST. PATIENT-LVL IV: CPT | Mod: PBBFAC,,, | Performed by: PODIATRIST

## 2021-05-11 PROCEDURE — 99999 PR PBB SHADOW E&M-EST. PATIENT-LVL III: CPT | Mod: PBBFAC,,, | Performed by: PHYSICIAN ASSISTANT

## 2021-05-21 ENCOUNTER — PATIENT OUTREACH (OUTPATIENT)
Dept: ADMINISTRATIVE | Facility: OTHER | Age: 56
End: 2021-05-21

## 2021-05-21 DIAGNOSIS — Z79.4 TYPE 2 DIABETES MELLITUS WITHOUT COMPLICATION, WITH LONG-TERM CURRENT USE OF INSULIN: Primary | ICD-10-CM

## 2021-05-21 DIAGNOSIS — E11.9 TYPE 2 DIABETES MELLITUS WITHOUT COMPLICATION, WITH LONG-TERM CURRENT USE OF INSULIN: Primary | ICD-10-CM

## 2021-05-23 PROBLEM — Z79.2 RECEIVING INTRAVENOUS ANTIBIOTIC TREATMENT AS OUTPATIENT: Status: RESOLVED | Noted: 2021-01-09 | Resolved: 2021-05-23

## 2021-05-25 ENCOUNTER — OFFICE VISIT (OUTPATIENT)
Dept: INFECTIOUS DISEASES | Facility: CLINIC | Age: 56
End: 2021-05-25
Payer: COMMERCIAL

## 2021-05-25 ENCOUNTER — OFFICE VISIT (OUTPATIENT)
Dept: PODIATRY | Facility: CLINIC | Age: 56
End: 2021-05-25
Payer: COMMERCIAL

## 2021-05-25 ENCOUNTER — LAB VISIT (OUTPATIENT)
Dept: LAB | Facility: HOSPITAL | Age: 56
End: 2021-05-25
Payer: COMMERCIAL

## 2021-05-25 VITALS
WEIGHT: 258.81 LBS | SYSTOLIC BLOOD PRESSURE: 145 MMHG | BODY MASS INDEX: 33.23 KG/M2 | HEART RATE: 61 BPM | DIASTOLIC BLOOD PRESSURE: 79 MMHG

## 2021-05-25 DIAGNOSIS — A49.01 MSSA (METHICILLIN SUSCEPTIBLE STAPHYLOCOCCUS AUREUS): ICD-10-CM

## 2021-05-25 DIAGNOSIS — I73.9 PVD (PERIPHERAL VASCULAR DISEASE): Primary | ICD-10-CM

## 2021-05-25 DIAGNOSIS — M86.9 OSTEOMYELITIS OF LEFT FOOT, UNSPECIFIED TYPE: ICD-10-CM

## 2021-05-25 DIAGNOSIS — L97.522 ULCER OF LEFT FOOT WITH FAT LAYER EXPOSED: Primary | ICD-10-CM

## 2021-05-25 DIAGNOSIS — A49.8 ANAEROBIC BACTERIAL INFECTION: ICD-10-CM

## 2021-05-25 DIAGNOSIS — A49.8 PSEUDOMONAS INFECTION: ICD-10-CM

## 2021-05-25 DIAGNOSIS — M86.072 ACUTE HEMATOGENOUS OSTEOMYELITIS OF LEFT FOOT: ICD-10-CM

## 2021-05-25 DIAGNOSIS — A49.8 INFECTION DUE TO ENTEROBACTER CLOACAE: ICD-10-CM

## 2021-05-25 DIAGNOSIS — L08.9 DIABETIC FOOT INFECTION: ICD-10-CM

## 2021-05-25 DIAGNOSIS — E11.628 DIABETIC FOOT INFECTION: ICD-10-CM

## 2021-05-25 LAB
ALBUMIN SERPL BCP-MCNC: 3.5 G/DL (ref 3.5–5.2)
ALP SERPL-CCNC: 97 U/L (ref 55–135)
ALT SERPL W/O P-5'-P-CCNC: 22 U/L (ref 10–44)
ANION GAP SERPL CALC-SCNC: 8 MMOL/L (ref 8–16)
AST SERPL-CCNC: 17 U/L (ref 10–40)
BASOPHILS # BLD AUTO: 0.05 K/UL (ref 0–0.2)
BASOPHILS NFR BLD: 0.8 % (ref 0–1.9)
BILIRUB SERPL-MCNC: 0.5 MG/DL (ref 0.1–1)
BUN SERPL-MCNC: 23 MG/DL (ref 6–20)
CALCIUM SERPL-MCNC: 9.3 MG/DL (ref 8.7–10.5)
CHLORIDE SERPL-SCNC: 102 MMOL/L (ref 95–110)
CO2 SERPL-SCNC: 24 MMOL/L (ref 23–29)
CREAT SERPL-MCNC: 1.1 MG/DL (ref 0.5–1.4)
CRP SERPL-MCNC: 3.9 MG/L (ref 0–8.2)
DIFFERENTIAL METHOD: ABNORMAL
EOSINOPHIL # BLD AUTO: 0.2 K/UL (ref 0–0.5)
EOSINOPHIL NFR BLD: 3.4 % (ref 0–8)
ERYTHROCYTE [DISTWIDTH] IN BLOOD BY AUTOMATED COUNT: 14.6 % (ref 11.5–14.5)
ERYTHROCYTE [SEDIMENTATION RATE] IN BLOOD BY WESTERGREN METHOD: 46 MM/HR (ref 0–23)
EST. GFR  (AFRICAN AMERICAN): >60 ML/MIN/1.73 M^2
EST. GFR  (NON AFRICAN AMERICAN): >60 ML/MIN/1.73 M^2
GLUCOSE SERPL-MCNC: 326 MG/DL (ref 70–110)
HCT VFR BLD AUTO: 42.6 % (ref 40–54)
HGB BLD-MCNC: 14.3 G/DL (ref 14–18)
IMM GRANULOCYTES # BLD AUTO: 0.06 K/UL (ref 0–0.04)
IMM GRANULOCYTES NFR BLD AUTO: 0.9 % (ref 0–0.5)
LYMPHOCYTES # BLD AUTO: 1.5 K/UL (ref 1–4.8)
LYMPHOCYTES NFR BLD: 23.6 % (ref 18–48)
MCH RBC QN AUTO: 27.9 PG (ref 27–31)
MCHC RBC AUTO-ENTMCNC: 33.6 G/DL (ref 32–36)
MCV RBC AUTO: 83 FL (ref 82–98)
MONOCYTES # BLD AUTO: 0.7 K/UL (ref 0.3–1)
MONOCYTES NFR BLD: 10.3 % (ref 4–15)
NEUTROPHILS # BLD AUTO: 3.9 K/UL (ref 1.8–7.7)
NEUTROPHILS NFR BLD: 61 % (ref 38–73)
NRBC BLD-RTO: 0 /100 WBC
PLATELET # BLD AUTO: 216 K/UL (ref 150–450)
PMV BLD AUTO: 10.1 FL (ref 9.2–12.9)
POTASSIUM SERPL-SCNC: 4.4 MMOL/L (ref 3.5–5.1)
PROT SERPL-MCNC: 7.4 G/DL (ref 6–8.4)
RBC # BLD AUTO: 5.12 M/UL (ref 4.6–6.2)
SODIUM SERPL-SCNC: 134 MMOL/L (ref 136–145)
WBC # BLD AUTO: 6.41 K/UL (ref 3.9–12.7)

## 2021-05-25 PROCEDURE — 80053 COMPREHEN METABOLIC PANEL: CPT | Performed by: PHYSICIAN ASSISTANT

## 2021-05-25 PROCEDURE — 99999 PR PBB SHADOW E&M-EST. PATIENT-LVL III: ICD-10-PCS | Mod: PBBFAC,,, | Performed by: PHYSICIAN ASSISTANT

## 2021-05-25 PROCEDURE — 99213 OFFICE O/P EST LOW 20 MIN: CPT | Mod: S$GLB,,, | Performed by: PHYSICIAN ASSISTANT

## 2021-05-25 PROCEDURE — 99024 POSTOP FOLLOW-UP VISIT: CPT | Mod: S$GLB,,, | Performed by: PODIATRIST

## 2021-05-25 PROCEDURE — 36415 COLL VENOUS BLD VENIPUNCTURE: CPT | Performed by: PHYSICIAN ASSISTANT

## 2021-05-25 PROCEDURE — 85652 RBC SED RATE AUTOMATED: CPT | Performed by: PHYSICIAN ASSISTANT

## 2021-05-25 PROCEDURE — 99499 NO LOS: ICD-10-PCS | Mod: S$GLB,,, | Performed by: PODIATRIST

## 2021-05-25 PROCEDURE — 99999 PR PBB SHADOW E&M-EST. PATIENT-LVL IV: CPT | Mod: PBBFAC,,, | Performed by: PODIATRIST

## 2021-05-25 PROCEDURE — 99499 UNLISTED E&M SERVICE: CPT | Mod: S$GLB,,, | Performed by: PODIATRIST

## 2021-05-25 PROCEDURE — 99999 PR PBB SHADOW E&M-EST. PATIENT-LVL III: CPT | Mod: PBBFAC,,, | Performed by: PHYSICIAN ASSISTANT

## 2021-05-25 PROCEDURE — 86140 C-REACTIVE PROTEIN: CPT | Performed by: PHYSICIAN ASSISTANT

## 2021-05-25 PROCEDURE — 99024 PR POST-OP FOLLOW-UP VISIT: ICD-10-PCS | Mod: S$GLB,,, | Performed by: PODIATRIST

## 2021-05-25 PROCEDURE — 99213 PR OFFICE/OUTPT VISIT, EST, LEVL III, 20-29 MIN: ICD-10-PCS | Mod: S$GLB,,, | Performed by: PHYSICIAN ASSISTANT

## 2021-05-25 PROCEDURE — 99999 PR PBB SHADOW E&M-EST. PATIENT-LVL IV: ICD-10-PCS | Mod: PBBFAC,,, | Performed by: PODIATRIST

## 2021-05-25 PROCEDURE — 85025 COMPLETE CBC W/AUTO DIFF WBC: CPT | Performed by: PHYSICIAN ASSISTANT

## 2021-05-25 RX ORDER — METRONIDAZOLE 500 MG/1
500 TABLET ORAL 3 TIMES DAILY
Qty: 21 TABLET | Refills: 0 | Status: SHIPPED | OUTPATIENT
Start: 2021-05-25 | End: 2021-06-01

## 2021-05-25 RX ORDER — CIPROFLOXACIN 750 MG/1
750 TABLET, FILM COATED ORAL EVERY 12 HOURS
Qty: 14 TABLET | Refills: 0 | Status: SHIPPED | OUTPATIENT
Start: 2021-05-25 | End: 2021-06-01

## 2021-05-25 RX ORDER — DOXYCYCLINE 100 MG/1
100 CAPSULE ORAL 2 TIMES DAILY
Qty: 14 CAPSULE | Refills: 0 | Status: SHIPPED | OUTPATIENT
Start: 2021-05-25 | End: 2021-06-01

## 2021-06-01 ENCOUNTER — OFFICE VISIT (OUTPATIENT)
Dept: PODIATRY | Facility: CLINIC | Age: 56
End: 2021-06-01
Payer: COMMERCIAL

## 2021-06-01 ENCOUNTER — OFFICE VISIT (OUTPATIENT)
Dept: INFECTIOUS DISEASES | Facility: CLINIC | Age: 56
End: 2021-06-01
Payer: COMMERCIAL

## 2021-06-01 VITALS
BODY MASS INDEX: 33.23 KG/M2 | WEIGHT: 258.81 LBS | SYSTOLIC BLOOD PRESSURE: 147 MMHG | DIASTOLIC BLOOD PRESSURE: 84 MMHG | HEART RATE: 59 BPM

## 2021-06-01 DIAGNOSIS — R60.0 LEG EDEMA, LEFT: ICD-10-CM

## 2021-06-01 DIAGNOSIS — M86.9 OSTEOMYELITIS OF LEFT FOOT, UNSPECIFIED TYPE: Primary | ICD-10-CM

## 2021-06-01 DIAGNOSIS — L97.522 ULCER OF LEFT FOOT, WITH FAT LAYER EXPOSED: ICD-10-CM

## 2021-06-01 PROCEDURE — 99024 POSTOP FOLLOW-UP VISIT: CPT | Mod: S$GLB,,, | Performed by: PODIATRIST

## 2021-06-01 PROCEDURE — 99999 PR PBB SHADOW E&M-EST. PATIENT-LVL III: CPT | Mod: PBBFAC,,, | Performed by: PODIATRIST

## 2021-06-01 PROCEDURE — 99024 PR POST-OP FOLLOW-UP VISIT: ICD-10-PCS | Mod: S$GLB,,, | Performed by: PODIATRIST

## 2021-06-01 PROCEDURE — 29580 PR STRAPPING UNNA BOOT: ICD-10-PCS | Mod: 58,LT,S$GLB, | Performed by: PODIATRIST

## 2021-06-01 PROCEDURE — 99213 PR OFFICE/OUTPT VISIT, EST, LEVL III, 20-29 MIN: ICD-10-PCS | Mod: S$GLB,,, | Performed by: PHYSICIAN ASSISTANT

## 2021-06-01 PROCEDURE — 99213 OFFICE O/P EST LOW 20 MIN: CPT | Mod: S$GLB,,, | Performed by: PHYSICIAN ASSISTANT

## 2021-06-01 PROCEDURE — 29580 STRAPPING UNNA BOOT: CPT | Mod: 58,LT,S$GLB, | Performed by: PODIATRIST

## 2021-06-01 PROCEDURE — 99999 PR PBB SHADOW E&M-EST. PATIENT-LVL III: ICD-10-PCS | Mod: PBBFAC,,, | Performed by: PODIATRIST

## 2021-06-10 ENCOUNTER — OFFICE VISIT (OUTPATIENT)
Dept: PODIATRY | Facility: CLINIC | Age: 56
End: 2021-06-10
Payer: COMMERCIAL

## 2021-06-10 VITALS
DIASTOLIC BLOOD PRESSURE: 78 MMHG | BODY MASS INDEX: 33.23 KG/M2 | WEIGHT: 258.81 LBS | HEART RATE: 66 BPM | SYSTOLIC BLOOD PRESSURE: 129 MMHG

## 2021-06-10 DIAGNOSIS — L97.522 ULCER OF LEFT FOOT, WITH FAT LAYER EXPOSED: Primary | ICD-10-CM

## 2021-06-10 DIAGNOSIS — R60.0 LEG EDEMA, LEFT: ICD-10-CM

## 2021-06-10 PROCEDURE — 99999 PR PBB SHADOW E&M-EST. PATIENT-LVL III: CPT | Mod: PBBFAC,,, | Performed by: PODIATRIST

## 2021-06-10 PROCEDURE — 99999 PR PBB SHADOW E&M-EST. PATIENT-LVL III: ICD-10-PCS | Mod: PBBFAC,,, | Performed by: PODIATRIST

## 2021-06-10 PROCEDURE — 99024 POSTOP FOLLOW-UP VISIT: CPT | Mod: S$GLB,,, | Performed by: PODIATRIST

## 2021-06-10 PROCEDURE — 99024 PR POST-OP FOLLOW-UP VISIT: ICD-10-PCS | Mod: S$GLB,,, | Performed by: PODIATRIST

## 2021-06-22 ENCOUNTER — PATIENT OUTREACH (OUTPATIENT)
Dept: ADMINISTRATIVE | Facility: OTHER | Age: 56
End: 2021-06-22

## 2021-06-24 ENCOUNTER — OFFICE VISIT (OUTPATIENT)
Dept: PODIATRY | Facility: CLINIC | Age: 56
End: 2021-06-24
Payer: COMMERCIAL

## 2021-06-24 VITALS
HEART RATE: 69 BPM | HEIGHT: 74 IN | BODY MASS INDEX: 33.21 KG/M2 | DIASTOLIC BLOOD PRESSURE: 83 MMHG | SYSTOLIC BLOOD PRESSURE: 154 MMHG | WEIGHT: 258.81 LBS

## 2021-06-24 DIAGNOSIS — L97.512 ULCER OF RIGHT FOOT WITH FAT LAYER EXPOSED: ICD-10-CM

## 2021-06-24 DIAGNOSIS — R60.0 LEG EDEMA, LEFT: ICD-10-CM

## 2021-06-24 DIAGNOSIS — L97.522 ULCER OF LEFT FOOT, WITH FAT LAYER EXPOSED: Primary | ICD-10-CM

## 2021-06-24 PROCEDURE — 99999 PR PBB SHADOW E&M-EST. PATIENT-LVL IV: CPT | Mod: PBBFAC,,, | Performed by: PODIATRIST

## 2021-06-24 PROCEDURE — 99024 PR POST-OP FOLLOW-UP VISIT: ICD-10-PCS | Mod: S$GLB,,, | Performed by: PODIATRIST

## 2021-06-24 PROCEDURE — 99999 PR PBB SHADOW E&M-EST. PATIENT-LVL IV: ICD-10-PCS | Mod: PBBFAC,,, | Performed by: PODIATRIST

## 2021-06-24 PROCEDURE — 99024 POSTOP FOLLOW-UP VISIT: CPT | Mod: S$GLB,,, | Performed by: PODIATRIST

## 2021-07-06 ENCOUNTER — PATIENT MESSAGE (OUTPATIENT)
Dept: ADMINISTRATIVE | Facility: HOSPITAL | Age: 56
End: 2021-07-06

## 2021-07-08 ENCOUNTER — OFFICE VISIT (OUTPATIENT)
Dept: PODIATRY | Facility: CLINIC | Age: 56
End: 2021-07-08
Payer: COMMERCIAL

## 2021-07-08 VITALS
BODY MASS INDEX: 33.75 KG/M2 | WEIGHT: 263 LBS | DIASTOLIC BLOOD PRESSURE: 81 MMHG | SYSTOLIC BLOOD PRESSURE: 146 MMHG | HEIGHT: 74 IN | HEART RATE: 70 BPM

## 2021-07-08 DIAGNOSIS — L97.522 ULCER OF LEFT FOOT, WITH FAT LAYER EXPOSED: Primary | ICD-10-CM

## 2021-07-08 PROCEDURE — 99213 PR OFFICE/OUTPT VISIT, EST, LEVL III, 20-29 MIN: ICD-10-PCS | Mod: 25,S$GLB,, | Performed by: PODIATRIST

## 2021-07-08 PROCEDURE — 99999 PR PBB SHADOW E&M-EST. PATIENT-LVL IV: ICD-10-PCS | Mod: PBBFAC,,, | Performed by: PODIATRIST

## 2021-07-08 PROCEDURE — 11042 DBRDMT SUBQ TIS 1ST 20SQCM/<: CPT | Mod: LT,S$GLB,, | Performed by: PODIATRIST

## 2021-07-08 PROCEDURE — 11042 PR DEBRIDEMENT, SKIN, SUB-Q TISSUE,=<20 SQ CM: ICD-10-PCS | Mod: LT,S$GLB,, | Performed by: PODIATRIST

## 2021-07-08 PROCEDURE — 99213 OFFICE O/P EST LOW 20 MIN: CPT | Mod: 25,S$GLB,, | Performed by: PODIATRIST

## 2021-07-08 PROCEDURE — 99999 PR PBB SHADOW E&M-EST. PATIENT-LVL IV: CPT | Mod: PBBFAC,,, | Performed by: PODIATRIST

## 2021-07-26 ENCOUNTER — OFFICE VISIT (OUTPATIENT)
Dept: PODIATRY | Facility: CLINIC | Age: 56
End: 2021-07-26
Payer: COMMERCIAL

## 2021-07-26 VITALS
SYSTOLIC BLOOD PRESSURE: 144 MMHG | BODY MASS INDEX: 33.77 KG/M2 | HEART RATE: 58 BPM | WEIGHT: 263 LBS | DIASTOLIC BLOOD PRESSURE: 73 MMHG

## 2021-07-26 DIAGNOSIS — E11.49 TYPE II DIABETES MELLITUS WITH NEUROLOGICAL MANIFESTATIONS: ICD-10-CM

## 2021-07-26 DIAGNOSIS — L97.522 ULCER OF LEFT FOOT, WITH FAT LAYER EXPOSED: Primary | ICD-10-CM

## 2021-07-26 DIAGNOSIS — L97.512 ULCER OF RIGHT FOOT WITH FAT LAYER EXPOSED: ICD-10-CM

## 2021-07-26 PROCEDURE — 11042 DBRDMT SUBQ TIS 1ST 20SQCM/<: CPT | Mod: S$GLB,,, | Performed by: PODIATRIST

## 2021-07-26 PROCEDURE — 87186 SC STD MICRODIL/AGAR DIL: CPT | Performed by: PODIATRIST

## 2021-07-26 PROCEDURE — 87077 CULTURE AEROBIC IDENTIFY: CPT | Performed by: PODIATRIST

## 2021-07-26 PROCEDURE — 87075 CULTR BACTERIA EXCEPT BLOOD: CPT | Performed by: PODIATRIST

## 2021-07-26 PROCEDURE — 99214 OFFICE O/P EST MOD 30 MIN: CPT | Mod: 25,S$GLB,, | Performed by: PODIATRIST

## 2021-07-26 PROCEDURE — 11042 WOUND DEBRIDEMENT: ICD-10-PCS | Mod: S$GLB,,, | Performed by: PODIATRIST

## 2021-07-26 PROCEDURE — 99214 PR OFFICE/OUTPT VISIT, EST, LEVL IV, 30-39 MIN: ICD-10-PCS | Mod: 25,S$GLB,, | Performed by: PODIATRIST

## 2021-07-26 PROCEDURE — 87076 CULTURE ANAEROBE IDENT EACH: CPT | Performed by: PODIATRIST

## 2021-07-26 PROCEDURE — 87070 CULTURE OTHR SPECIMN AEROBIC: CPT | Performed by: PODIATRIST

## 2021-07-26 PROCEDURE — 99999 PR PBB SHADOW E&M-EST. PATIENT-LVL III: ICD-10-PCS | Mod: PBBFAC,,, | Performed by: PODIATRIST

## 2021-07-26 PROCEDURE — 99999 PR PBB SHADOW E&M-EST. PATIENT-LVL III: CPT | Mod: PBBFAC,,, | Performed by: PODIATRIST

## 2021-07-28 ENCOUNTER — TELEPHONE (OUTPATIENT)
Dept: PODIATRY | Facility: CLINIC | Age: 56
End: 2021-07-28

## 2021-07-28 ENCOUNTER — PATIENT MESSAGE (OUTPATIENT)
Dept: PODIATRY | Facility: CLINIC | Age: 56
End: 2021-07-28

## 2021-07-28 DIAGNOSIS — L97.512 ULCER OF RIGHT FOOT WITH FAT LAYER EXPOSED: ICD-10-CM

## 2021-07-28 DIAGNOSIS — E11.49 TYPE II DIABETES MELLITUS WITH NEUROLOGICAL MANIFESTATIONS: ICD-10-CM

## 2021-07-28 DIAGNOSIS — L97.522 ULCER OF LEFT FOOT, WITH FAT LAYER EXPOSED: Primary | ICD-10-CM

## 2021-07-28 LAB — BACTERIA SPEC AEROBE CULT: ABNORMAL

## 2021-07-28 RX ORDER — DOXYCYCLINE HYCLATE 100 MG
100 TABLET ORAL 2 TIMES DAILY
Qty: 20 TABLET | Refills: 0 | Status: SHIPPED | OUTPATIENT
Start: 2021-07-28 | End: 2022-02-09

## 2021-07-30 LAB — BACTERIA SPEC ANAEROBE CULT: ABNORMAL

## 2021-08-03 ENCOUNTER — PATIENT MESSAGE (OUTPATIENT)
Dept: ADMINISTRATIVE | Facility: HOSPITAL | Age: 56
End: 2021-08-03

## 2021-08-05 ENCOUNTER — PATIENT OUTREACH (OUTPATIENT)
Dept: ADMINISTRATIVE | Facility: OTHER | Age: 56
End: 2021-08-05

## 2021-08-09 ENCOUNTER — OFFICE VISIT (OUTPATIENT)
Dept: PODIATRY | Facility: CLINIC | Age: 56
End: 2021-08-09
Payer: COMMERCIAL

## 2021-08-09 ENCOUNTER — EXTERNAL HOME HEALTH (OUTPATIENT)
Dept: HOME HEALTH SERVICES | Facility: HOSPITAL | Age: 56
End: 2021-08-09
Payer: COMMERCIAL

## 2021-08-09 VITALS
HEART RATE: 56 BPM | SYSTOLIC BLOOD PRESSURE: 143 MMHG | HEIGHT: 74 IN | BODY MASS INDEX: 33.75 KG/M2 | RESPIRATION RATE: 18 BRPM | WEIGHT: 263 LBS | DIASTOLIC BLOOD PRESSURE: 82 MMHG

## 2021-08-09 DIAGNOSIS — E11.49 TYPE II DIABETES MELLITUS WITH NEUROLOGICAL MANIFESTATIONS: ICD-10-CM

## 2021-08-09 DIAGNOSIS — L97.522 ULCER OF LEFT FOOT, WITH FAT LAYER EXPOSED: Primary | ICD-10-CM

## 2021-08-09 DIAGNOSIS — R60.0 LEG EDEMA, LEFT: ICD-10-CM

## 2021-08-09 DIAGNOSIS — L97.512 ULCER OF RIGHT FOOT WITH FAT LAYER EXPOSED: ICD-10-CM

## 2021-08-09 PROCEDURE — 99213 OFFICE O/P EST LOW 20 MIN: CPT | Mod: 25,S$GLB,, | Performed by: PODIATRIST

## 2021-08-09 PROCEDURE — 99999 PR PBB SHADOW E&M-EST. PATIENT-LVL IV: ICD-10-PCS | Mod: PBBFAC,,, | Performed by: PODIATRIST

## 2021-08-09 PROCEDURE — G0180 MD CERTIFICATION HHA PATIENT: HCPCS | Mod: ,,, | Performed by: PODIATRIST

## 2021-08-09 PROCEDURE — 11042 DBRDMT SUBQ TIS 1ST 20SQCM/<: CPT | Mod: S$GLB,,, | Performed by: PODIATRIST

## 2021-08-09 PROCEDURE — 11042 PR DEBRIDEMENT, SKIN, SUB-Q TISSUE,=<20 SQ CM: ICD-10-PCS | Mod: S$GLB,,, | Performed by: PODIATRIST

## 2021-08-09 PROCEDURE — 99999 PR PBB SHADOW E&M-EST. PATIENT-LVL IV: CPT | Mod: PBBFAC,,, | Performed by: PODIATRIST

## 2021-08-09 PROCEDURE — 99213 PR OFFICE/OUTPT VISIT, EST, LEVL III, 20-29 MIN: ICD-10-PCS | Mod: 25,S$GLB,, | Performed by: PODIATRIST

## 2021-08-09 PROCEDURE — G0180 PR HOME HEALTH MD CERTIFICATION: ICD-10-PCS | Mod: ,,, | Performed by: PODIATRIST

## 2021-08-23 ENCOUNTER — OFFICE VISIT (OUTPATIENT)
Dept: PODIATRY | Facility: CLINIC | Age: 56
End: 2021-08-23
Payer: COMMERCIAL

## 2021-08-23 VITALS
DIASTOLIC BLOOD PRESSURE: 82 MMHG | WEIGHT: 263 LBS | BODY MASS INDEX: 33.77 KG/M2 | HEART RATE: 51 BPM | SYSTOLIC BLOOD PRESSURE: 146 MMHG

## 2021-08-23 DIAGNOSIS — E11.49 TYPE II DIABETES MELLITUS WITH NEUROLOGICAL MANIFESTATIONS: ICD-10-CM

## 2021-08-23 DIAGNOSIS — L97.512 ULCER OF RIGHT FOOT WITH FAT LAYER EXPOSED: ICD-10-CM

## 2021-08-23 DIAGNOSIS — R60.0 LEG EDEMA, LEFT: ICD-10-CM

## 2021-08-23 DIAGNOSIS — L97.522 ULCER OF LEFT FOOT, WITH FAT LAYER EXPOSED: Primary | ICD-10-CM

## 2021-08-23 PROCEDURE — 11042 PR DEBRIDEMENT, SKIN, SUB-Q TISSUE,=<20 SQ CM: ICD-10-PCS | Mod: S$GLB,,, | Performed by: PODIATRIST

## 2021-08-23 PROCEDURE — 11042 DBRDMT SUBQ TIS 1ST 20SQCM/<: CPT | Mod: S$GLB,,, | Performed by: PODIATRIST

## 2021-08-23 PROCEDURE — 99213 PR OFFICE/OUTPT VISIT, EST, LEVL III, 20-29 MIN: ICD-10-PCS | Mod: 25,S$GLB,, | Performed by: PODIATRIST

## 2021-08-23 PROCEDURE — 99999 PR PBB SHADOW E&M-EST. PATIENT-LVL IV: CPT | Mod: PBBFAC,,, | Performed by: PODIATRIST

## 2021-08-23 PROCEDURE — 99213 OFFICE O/P EST LOW 20 MIN: CPT | Mod: 25,S$GLB,, | Performed by: PODIATRIST

## 2021-08-23 PROCEDURE — 99999 PR PBB SHADOW E&M-EST. PATIENT-LVL IV: ICD-10-PCS | Mod: PBBFAC,,, | Performed by: PODIATRIST

## 2021-09-07 ENCOUNTER — OFFICE VISIT (OUTPATIENT)
Dept: PODIATRY | Facility: CLINIC | Age: 56
End: 2021-09-07
Payer: COMMERCIAL

## 2021-09-07 VITALS
HEIGHT: 74 IN | SYSTOLIC BLOOD PRESSURE: 143 MMHG | WEIGHT: 262.56 LBS | BODY MASS INDEX: 33.7 KG/M2 | DIASTOLIC BLOOD PRESSURE: 79 MMHG

## 2021-09-07 DIAGNOSIS — L97.512 ULCER OF RIGHT FOOT WITH FAT LAYER EXPOSED: ICD-10-CM

## 2021-09-07 DIAGNOSIS — E11.49 TYPE II DIABETES MELLITUS WITH NEUROLOGICAL MANIFESTATIONS: ICD-10-CM

## 2021-09-07 DIAGNOSIS — L97.522 ULCER OF LEFT FOOT, WITH FAT LAYER EXPOSED: Primary | ICD-10-CM

## 2021-09-07 PROCEDURE — 99213 PR OFFICE/OUTPT VISIT, EST, LEVL III, 20-29 MIN: ICD-10-PCS | Mod: 25,S$GLB,, | Performed by: PODIATRIST

## 2021-09-07 PROCEDURE — 99213 OFFICE O/P EST LOW 20 MIN: CPT | Mod: 25,S$GLB,, | Performed by: PODIATRIST

## 2021-09-07 PROCEDURE — 99999 PR PBB SHADOW E&M-EST. PATIENT-LVL IV: CPT | Mod: PBBFAC,,, | Performed by: PODIATRIST

## 2021-09-07 PROCEDURE — 99999 PR PBB SHADOW E&M-EST. PATIENT-LVL IV: ICD-10-PCS | Mod: PBBFAC,,, | Performed by: PODIATRIST

## 2021-09-07 PROCEDURE — 11042 DBRDMT SUBQ TIS 1ST 20SQCM/<: CPT | Mod: S$GLB,,, | Performed by: PODIATRIST

## 2021-09-07 PROCEDURE — 11042 PR DEBRIDEMENT, SKIN, SUB-Q TISSUE,=<20 SQ CM: ICD-10-PCS | Mod: S$GLB,,, | Performed by: PODIATRIST

## 2021-09-20 ENCOUNTER — OFFICE VISIT (OUTPATIENT)
Dept: PODIATRY | Facility: CLINIC | Age: 56
End: 2021-09-20
Payer: COMMERCIAL

## 2021-09-20 ENCOUNTER — TELEPHONE (OUTPATIENT)
Dept: PRIMARY CARE CLINIC | Facility: CLINIC | Age: 56
End: 2021-09-20

## 2021-09-20 VITALS
RESPIRATION RATE: 18 BRPM | WEIGHT: 262 LBS | SYSTOLIC BLOOD PRESSURE: 152 MMHG | BODY MASS INDEX: 35.49 KG/M2 | DIASTOLIC BLOOD PRESSURE: 87 MMHG | HEIGHT: 72 IN | HEART RATE: 52 BPM

## 2021-09-20 DIAGNOSIS — L97.522 ULCER OF LEFT FOOT, WITH FAT LAYER EXPOSED: Primary | ICD-10-CM

## 2021-09-20 DIAGNOSIS — L97.512 ULCER OF RIGHT FOOT WITH FAT LAYER EXPOSED: ICD-10-CM

## 2021-09-20 DIAGNOSIS — E11.49 TYPE II DIABETES MELLITUS WITH NEUROLOGICAL MANIFESTATIONS: ICD-10-CM

## 2021-09-20 PROCEDURE — 99499 UNLISTED E&M SERVICE: CPT | Mod: S$GLB,,, | Performed by: PODIATRIST

## 2021-09-20 PROCEDURE — 99999 PR PBB SHADOW E&M-EST. PATIENT-LVL V: ICD-10-PCS | Mod: PBBFAC,,, | Performed by: PODIATRIST

## 2021-09-20 PROCEDURE — 99999 PR PBB SHADOW E&M-EST. PATIENT-LVL V: CPT | Mod: PBBFAC,,, | Performed by: PODIATRIST

## 2021-09-20 PROCEDURE — 99499 NO LOS: ICD-10-PCS | Mod: S$GLB,,, | Performed by: PODIATRIST

## 2021-09-20 PROCEDURE — 11042 DBRDMT SUBQ TIS 1ST 20SQCM/<: CPT | Mod: S$GLB,,, | Performed by: PODIATRIST

## 2021-09-20 PROCEDURE — 11042 WOUND DEBRIDEMENT: ICD-10-PCS | Mod: S$GLB,,, | Performed by: PODIATRIST

## 2021-09-29 ENCOUNTER — PATIENT OUTREACH (OUTPATIENT)
Dept: ADMINISTRATIVE | Facility: OTHER | Age: 56
End: 2021-09-29

## 2021-10-04 ENCOUNTER — OFFICE VISIT (OUTPATIENT)
Dept: PODIATRY | Facility: CLINIC | Age: 56
End: 2021-10-04
Payer: COMMERCIAL

## 2021-10-04 VITALS
HEART RATE: 45 BPM | SYSTOLIC BLOOD PRESSURE: 157 MMHG | BODY MASS INDEX: 35.48 KG/M2 | DIASTOLIC BLOOD PRESSURE: 94 MMHG | HEIGHT: 72 IN | WEIGHT: 261.94 LBS

## 2021-10-04 DIAGNOSIS — L97.512 ULCER OF RIGHT FOOT WITH FAT LAYER EXPOSED: ICD-10-CM

## 2021-10-04 DIAGNOSIS — E11.49 TYPE II DIABETES MELLITUS WITH NEUROLOGICAL MANIFESTATIONS: ICD-10-CM

## 2021-10-04 DIAGNOSIS — L97.522 ULCER OF LEFT FOOT, WITH FAT LAYER EXPOSED: Primary | ICD-10-CM

## 2021-10-04 PROCEDURE — 99999 PR PBB SHADOW E&M-EST. PATIENT-LVL IV: ICD-10-PCS | Mod: PBBFAC,,, | Performed by: PODIATRIST

## 2021-10-04 PROCEDURE — 99213 PR OFFICE/OUTPT VISIT, EST, LEVL III, 20-29 MIN: ICD-10-PCS | Mod: 25,S$GLB,, | Performed by: PODIATRIST

## 2021-10-04 PROCEDURE — 99213 OFFICE O/P EST LOW 20 MIN: CPT | Mod: 25,S$GLB,, | Performed by: PODIATRIST

## 2021-10-04 PROCEDURE — 99999 PR PBB SHADOW E&M-EST. PATIENT-LVL IV: CPT | Mod: PBBFAC,,, | Performed by: PODIATRIST

## 2021-10-07 ENCOUNTER — TELEPHONE (OUTPATIENT)
Dept: INTERNAL MEDICINE | Facility: CLINIC | Age: 56
End: 2021-10-07

## 2021-10-07 DIAGNOSIS — I15.2 HYPERTENSION ASSOCIATED WITH DIABETES: ICD-10-CM

## 2021-10-07 DIAGNOSIS — E11.69 DYSLIPIDEMIA ASSOCIATED WITH TYPE 2 DIABETES MELLITUS: ICD-10-CM

## 2021-10-07 DIAGNOSIS — E11.59 HYPERTENSION ASSOCIATED WITH DIABETES: ICD-10-CM

## 2021-10-07 DIAGNOSIS — E78.5 DYSLIPIDEMIA ASSOCIATED WITH TYPE 2 DIABETES MELLITUS: ICD-10-CM

## 2021-10-07 DIAGNOSIS — Z12.5 PROSTATE CANCER SCREENING: ICD-10-CM

## 2021-10-07 DIAGNOSIS — Z79.4 DIABETES MELLITUS WITH INSULIN THERAPY: Primary | ICD-10-CM

## 2021-10-07 DIAGNOSIS — E11.9 DIABETES MELLITUS WITH INSULIN THERAPY: Primary | ICD-10-CM

## 2021-10-18 ENCOUNTER — PATIENT MESSAGE (OUTPATIENT)
Dept: ADMINISTRATIVE | Facility: HOSPITAL | Age: 56
End: 2021-10-18
Payer: COMMERCIAL

## 2021-10-18 ENCOUNTER — OFFICE VISIT (OUTPATIENT)
Dept: PODIATRY | Facility: CLINIC | Age: 56
End: 2021-10-18
Payer: COMMERCIAL

## 2021-10-18 VITALS
WEIGHT: 261.94 LBS | HEIGHT: 72 IN | DIASTOLIC BLOOD PRESSURE: 90 MMHG | SYSTOLIC BLOOD PRESSURE: 177 MMHG | BODY MASS INDEX: 35.48 KG/M2 | HEART RATE: 74 BPM

## 2021-10-18 DIAGNOSIS — L97.512 ULCER OF RIGHT FOOT WITH FAT LAYER EXPOSED: ICD-10-CM

## 2021-10-18 DIAGNOSIS — L97.522 ULCER OF LEFT FOOT, WITH FAT LAYER EXPOSED: Primary | ICD-10-CM

## 2021-10-18 DIAGNOSIS — E11.49 TYPE II DIABETES MELLITUS WITH NEUROLOGICAL MANIFESTATIONS: ICD-10-CM

## 2021-10-18 PROCEDURE — 99999 PR PBB SHADOW E&M-EST. PATIENT-LVL IV: ICD-10-PCS | Mod: PBBFAC,,, | Performed by: PODIATRIST

## 2021-10-18 PROCEDURE — 11042 PR DEBRIDEMENT, SKIN, SUB-Q TISSUE,=<20 SQ CM: ICD-10-PCS | Mod: S$GLB,,, | Performed by: PODIATRIST

## 2021-10-18 PROCEDURE — 99213 OFFICE O/P EST LOW 20 MIN: CPT | Mod: 25,S$GLB,, | Performed by: PODIATRIST

## 2021-10-18 PROCEDURE — 99213 PR OFFICE/OUTPT VISIT, EST, LEVL III, 20-29 MIN: ICD-10-PCS | Mod: 25,S$GLB,, | Performed by: PODIATRIST

## 2021-10-18 PROCEDURE — 99999 PR PBB SHADOW E&M-EST. PATIENT-LVL IV: CPT | Mod: PBBFAC,,, | Performed by: PODIATRIST

## 2021-10-18 PROCEDURE — 11042 DBRDMT SUBQ TIS 1ST 20SQCM/<: CPT | Mod: S$GLB,,, | Performed by: PODIATRIST

## 2021-10-30 ENCOUNTER — PATIENT OUTREACH (OUTPATIENT)
Dept: ADMINISTRATIVE | Facility: OTHER | Age: 56
End: 2021-10-30
Payer: COMMERCIAL

## 2021-11-01 ENCOUNTER — OFFICE VISIT (OUTPATIENT)
Dept: PODIATRY | Facility: CLINIC | Age: 56
End: 2021-11-01
Payer: COMMERCIAL

## 2021-11-01 VITALS
BODY MASS INDEX: 35.52 KG/M2 | DIASTOLIC BLOOD PRESSURE: 98 MMHG | HEART RATE: 50 BPM | WEIGHT: 261.94 LBS | SYSTOLIC BLOOD PRESSURE: 175 MMHG

## 2021-11-01 DIAGNOSIS — E11.49 TYPE II DIABETES MELLITUS WITH NEUROLOGICAL MANIFESTATIONS: ICD-10-CM

## 2021-11-01 DIAGNOSIS — L97.512 ULCER OF RIGHT FOOT WITH FAT LAYER EXPOSED: ICD-10-CM

## 2021-11-01 DIAGNOSIS — L97.522 ULCER OF LEFT FOOT, WITH FAT LAYER EXPOSED: Primary | ICD-10-CM

## 2021-11-01 PROCEDURE — 99213 PR OFFICE/OUTPT VISIT, EST, LEVL III, 20-29 MIN: ICD-10-PCS | Mod: 25,S$GLB,, | Performed by: PODIATRIST

## 2021-11-01 PROCEDURE — 11042 PR DEBRIDEMENT, SKIN, SUB-Q TISSUE,=<20 SQ CM: ICD-10-PCS | Mod: LT,S$GLB,, | Performed by: PODIATRIST

## 2021-11-01 PROCEDURE — 99999 PR PBB SHADOW E&M-EST. PATIENT-LVL III: CPT | Mod: PBBFAC,,, | Performed by: PODIATRIST

## 2021-11-01 PROCEDURE — 11042 DBRDMT SUBQ TIS 1ST 20SQCM/<: CPT | Mod: LT,S$GLB,, | Performed by: PODIATRIST

## 2021-11-01 PROCEDURE — 99213 OFFICE O/P EST LOW 20 MIN: CPT | Mod: 25,S$GLB,, | Performed by: PODIATRIST

## 2021-11-01 PROCEDURE — 99999 PR PBB SHADOW E&M-EST. PATIENT-LVL III: ICD-10-PCS | Mod: PBBFAC,,, | Performed by: PODIATRIST

## 2021-11-15 ENCOUNTER — OFFICE VISIT (OUTPATIENT)
Dept: PODIATRY | Facility: CLINIC | Age: 56
End: 2021-11-15
Payer: COMMERCIAL

## 2021-11-15 VITALS
DIASTOLIC BLOOD PRESSURE: 96 MMHG | HEIGHT: 72 IN | HEART RATE: 73 BPM | BODY MASS INDEX: 35.35 KG/M2 | SYSTOLIC BLOOD PRESSURE: 170 MMHG | RESPIRATION RATE: 19 BRPM | WEIGHT: 261 LBS

## 2021-11-15 DIAGNOSIS — L97.522 ULCER OF LEFT FOOT, WITH FAT LAYER EXPOSED: Primary | ICD-10-CM

## 2021-11-15 DIAGNOSIS — L97.512 ULCER OF RIGHT FOOT WITH FAT LAYER EXPOSED: ICD-10-CM

## 2021-11-15 DIAGNOSIS — E11.49 TYPE II DIABETES MELLITUS WITH NEUROLOGICAL MANIFESTATIONS: ICD-10-CM

## 2021-11-15 PROCEDURE — 99999 PR PBB SHADOW E&M-EST. PATIENT-LVL IV: ICD-10-PCS | Mod: PBBFAC,,, | Performed by: PODIATRIST

## 2021-11-15 PROCEDURE — 99213 PR OFFICE/OUTPT VISIT, EST, LEVL III, 20-29 MIN: ICD-10-PCS | Mod: 25,S$GLB,, | Performed by: PODIATRIST

## 2021-11-15 PROCEDURE — 99999 PR PBB SHADOW E&M-EST. PATIENT-LVL IV: CPT | Mod: PBBFAC,,, | Performed by: PODIATRIST

## 2021-11-15 PROCEDURE — 11042 DBRDMT SUBQ TIS 1ST 20SQCM/<: CPT | Mod: S$GLB,,, | Performed by: PODIATRIST

## 2021-11-15 PROCEDURE — 11042 PR DEBRIDEMENT, SKIN, SUB-Q TISSUE,=<20 SQ CM: ICD-10-PCS | Mod: S$GLB,,, | Performed by: PODIATRIST

## 2021-11-15 PROCEDURE — 99213 OFFICE O/P EST LOW 20 MIN: CPT | Mod: 25,S$GLB,, | Performed by: PODIATRIST

## 2021-11-29 ENCOUNTER — OFFICE VISIT (OUTPATIENT)
Dept: PODIATRY | Facility: CLINIC | Age: 56
End: 2021-11-29
Payer: COMMERCIAL

## 2021-11-29 VITALS
WEIGHT: 261 LBS | DIASTOLIC BLOOD PRESSURE: 88 MMHG | HEART RATE: 71 BPM | BODY MASS INDEX: 35.4 KG/M2 | SYSTOLIC BLOOD PRESSURE: 154 MMHG

## 2021-11-29 DIAGNOSIS — L97.512 ULCER OF RIGHT FOOT WITH FAT LAYER EXPOSED: ICD-10-CM

## 2021-11-29 DIAGNOSIS — E11.49 TYPE II DIABETES MELLITUS WITH NEUROLOGICAL MANIFESTATIONS: ICD-10-CM

## 2021-11-29 DIAGNOSIS — L97.522 ULCER OF LEFT FOOT, WITH FAT LAYER EXPOSED: Primary | ICD-10-CM

## 2021-11-29 PROCEDURE — 99213 OFFICE O/P EST LOW 20 MIN: CPT | Mod: 25,S$GLB,, | Performed by: PODIATRIST

## 2021-11-29 PROCEDURE — 11042 PR DEBRIDEMENT, SKIN, SUB-Q TISSUE,=<20 SQ CM: ICD-10-PCS | Mod: S$GLB,,, | Performed by: PODIATRIST

## 2021-11-29 PROCEDURE — 99999 PR PBB SHADOW E&M-EST. PATIENT-LVL III: ICD-10-PCS | Mod: PBBFAC,,, | Performed by: PODIATRIST

## 2021-11-29 PROCEDURE — 99213 PR OFFICE/OUTPT VISIT, EST, LEVL III, 20-29 MIN: ICD-10-PCS | Mod: 25,S$GLB,, | Performed by: PODIATRIST

## 2021-11-29 PROCEDURE — 11042 DBRDMT SUBQ TIS 1ST 20SQCM/<: CPT | Mod: S$GLB,,, | Performed by: PODIATRIST

## 2021-11-29 PROCEDURE — 99999 PR PBB SHADOW E&M-EST. PATIENT-LVL III: CPT | Mod: PBBFAC,,, | Performed by: PODIATRIST

## 2021-11-29 PROCEDURE — 4010F ACE/ARB THERAPY RXD/TAKEN: CPT | Mod: CPTII,S$GLB,, | Performed by: PODIATRIST

## 2021-11-29 PROCEDURE — 4010F PR ACE/ARB THEARPY RXD/TAKEN: ICD-10-PCS | Mod: CPTII,S$GLB,, | Performed by: PODIATRIST

## 2021-12-07 ENCOUNTER — PATIENT OUTREACH (OUTPATIENT)
Dept: ADMINISTRATIVE | Facility: OTHER | Age: 56
End: 2021-12-07
Payer: COMMERCIAL

## 2021-12-13 ENCOUNTER — TELEPHONE (OUTPATIENT)
Dept: PODIATRY | Facility: CLINIC | Age: 56
End: 2021-12-13
Payer: COMMERCIAL

## 2021-12-14 ENCOUNTER — OFFICE VISIT (OUTPATIENT)
Dept: PODIATRY | Facility: CLINIC | Age: 56
End: 2021-12-14
Payer: COMMERCIAL

## 2021-12-14 VITALS
SYSTOLIC BLOOD PRESSURE: 148 MMHG | WEIGHT: 261 LBS | DIASTOLIC BLOOD PRESSURE: 77 MMHG | BODY MASS INDEX: 35.4 KG/M2 | HEART RATE: 60 BPM

## 2021-12-14 DIAGNOSIS — L97.522 ULCER OF LEFT FOOT, WITH FAT LAYER EXPOSED: Primary | ICD-10-CM

## 2021-12-14 DIAGNOSIS — L97.512 ULCER OF RIGHT FOOT WITH FAT LAYER EXPOSED: ICD-10-CM

## 2021-12-14 DIAGNOSIS — E11.49 TYPE II DIABETES MELLITUS WITH NEUROLOGICAL MANIFESTATIONS: ICD-10-CM

## 2021-12-14 PROCEDURE — 99999 PR PBB SHADOW E&M-EST. PATIENT-LVL III: CPT | Mod: PBBFAC,,, | Performed by: PODIATRIST

## 2021-12-14 PROCEDURE — 99213 PR OFFICE/OUTPT VISIT, EST, LEVL III, 20-29 MIN: ICD-10-PCS | Mod: 25,S$GLB,, | Performed by: PODIATRIST

## 2021-12-14 PROCEDURE — 11042 DBRDMT SUBQ TIS 1ST 20SQCM/<: CPT | Mod: S$GLB,,, | Performed by: PODIATRIST

## 2021-12-14 PROCEDURE — 4010F PR ACE/ARB THEARPY RXD/TAKEN: ICD-10-PCS | Mod: CPTII,S$GLB,, | Performed by: PODIATRIST

## 2021-12-14 PROCEDURE — 11042 PR DEBRIDEMENT, SKIN, SUB-Q TISSUE,=<20 SQ CM: ICD-10-PCS | Mod: S$GLB,,, | Performed by: PODIATRIST

## 2021-12-14 PROCEDURE — 4010F ACE/ARB THERAPY RXD/TAKEN: CPT | Mod: CPTII,S$GLB,, | Performed by: PODIATRIST

## 2021-12-14 PROCEDURE — 99213 OFFICE O/P EST LOW 20 MIN: CPT | Mod: 25,S$GLB,, | Performed by: PODIATRIST

## 2021-12-14 PROCEDURE — 99999 PR PBB SHADOW E&M-EST. PATIENT-LVL III: ICD-10-PCS | Mod: PBBFAC,,, | Performed by: PODIATRIST

## 2021-12-28 ENCOUNTER — OFFICE VISIT (OUTPATIENT)
Dept: PODIATRY | Facility: CLINIC | Age: 56
End: 2021-12-28
Payer: COMMERCIAL

## 2021-12-28 VITALS
WEIGHT: 261 LBS | BODY MASS INDEX: 35.4 KG/M2 | DIASTOLIC BLOOD PRESSURE: 89 MMHG | SYSTOLIC BLOOD PRESSURE: 165 MMHG | HEART RATE: 60 BPM

## 2021-12-28 DIAGNOSIS — L97.522 ULCER OF LEFT FOOT, WITH FAT LAYER EXPOSED: Primary | ICD-10-CM

## 2021-12-28 DIAGNOSIS — L97.512 ULCER OF RIGHT FOOT WITH FAT LAYER EXPOSED: ICD-10-CM

## 2021-12-28 PROCEDURE — 99213 OFFICE O/P EST LOW 20 MIN: CPT | Mod: 25,S$GLB,, | Performed by: PODIATRIST

## 2021-12-28 PROCEDURE — 3079F DIAST BP 80-89 MM HG: CPT | Mod: CPTII,S$GLB,, | Performed by: PODIATRIST

## 2021-12-28 PROCEDURE — 99999 PR PBB SHADOW E&M-EST. PATIENT-LVL IV: ICD-10-PCS | Mod: PBBFAC,,, | Performed by: PODIATRIST

## 2021-12-28 PROCEDURE — 99999 PR PBB SHADOW E&M-EST. PATIENT-LVL IV: CPT | Mod: PBBFAC,,, | Performed by: PODIATRIST

## 2021-12-28 PROCEDURE — 11042 PR DEBRIDEMENT, SKIN, SUB-Q TISSUE,=<20 SQ CM: ICD-10-PCS | Mod: S$GLB,,, | Performed by: PODIATRIST

## 2021-12-28 PROCEDURE — 11042 DBRDMT SUBQ TIS 1ST 20SQCM/<: CPT | Mod: S$GLB,,, | Performed by: PODIATRIST

## 2021-12-28 PROCEDURE — 3008F BODY MASS INDEX DOCD: CPT | Mod: CPTII,S$GLB,, | Performed by: PODIATRIST

## 2021-12-28 PROCEDURE — 3077F SYST BP >= 140 MM HG: CPT | Mod: CPTII,S$GLB,, | Performed by: PODIATRIST

## 2021-12-28 PROCEDURE — 99213 PR OFFICE/OUTPT VISIT, EST, LEVL III, 20-29 MIN: ICD-10-PCS | Mod: 25,S$GLB,, | Performed by: PODIATRIST

## 2021-12-28 PROCEDURE — 3079F PR MOST RECENT DIASTOLIC BLOOD PRESSURE 80-89 MM HG: ICD-10-PCS | Mod: CPTII,S$GLB,, | Performed by: PODIATRIST

## 2021-12-28 PROCEDURE — 3008F PR BODY MASS INDEX (BMI) DOCUMENTED: ICD-10-PCS | Mod: CPTII,S$GLB,, | Performed by: PODIATRIST

## 2021-12-28 PROCEDURE — 1159F PR MEDICATION LIST DOCUMENTED IN MEDICAL RECORD: ICD-10-PCS | Mod: CPTII,S$GLB,, | Performed by: PODIATRIST

## 2021-12-28 PROCEDURE — 1159F MED LIST DOCD IN RCRD: CPT | Mod: CPTII,S$GLB,, | Performed by: PODIATRIST

## 2021-12-28 PROCEDURE — 3077F PR MOST RECENT SYSTOLIC BLOOD PRESSURE >= 140 MM HG: ICD-10-PCS | Mod: CPTII,S$GLB,, | Performed by: PODIATRIST

## 2021-12-28 PROCEDURE — 1160F PR REVIEW ALL MEDS BY PRESCRIBER/CLIN PHARMACIST DOCUMENTED: ICD-10-PCS | Mod: CPTII,S$GLB,, | Performed by: PODIATRIST

## 2021-12-28 PROCEDURE — 1160F RVW MEDS BY RX/DR IN RCRD: CPT | Mod: CPTII,S$GLB,, | Performed by: PODIATRIST

## 2022-01-02 NOTE — PROGRESS NOTES
Subjective:      Patient ID: Billy Rivera is a 56 y.o. male.    Chief Complaint: Wound Care (Wound on feet )    Chronic wound left and right foot.  Changing dressing daily at home every other day.  Ambulating in surgical shoe left  casual shoe right. No new complaints today.  He has been alternating Iodosorb and Medihoney.  He continues to be on his feet quite often due to work.      Hemoglobin A1C   Date Value Ref Range Status   12/22/2020 10.5 (H) 4.0 - 5.6 % Final     Comment:     ADA Screening Guidelines:  5.7-6.4%  Consistent with prediabetes  >or=6.5%  Consistent with diabetes  High levels of fetal hemoglobin interfere with the HbA1C  assay. Heterozygous hemoglobin variants (HbS, HgC, etc)do  not significantly interfere with this assay.   However, presence of multiple variants may affect accuracy.     10/13/2020 10.8 (H) 4.0 - 5.6 % Final     Comment:     ADA Screening Guidelines:  5.7-6.4%  Consistent with prediabetes  >or=6.5%  Consistent with diabetes  High levels of fetal hemoglobin interfere with the HbA1C  assay. Heterozygous hemoglobin variants (HbS, HgC, etc)do  not significantly interfere with this assay.   However, presence of multiple variants may affect accuracy.     08/12/2020 11.5 (H) 4.0 - 5.6 % Final     Comment:     ADA Screening Guidelines:  5.7-6.4%  Consistent with prediabetes  >or=6.5%  Consistent with diabetes  High levels of fetal hemoglobin interfere with the HbA1C  assay. Heterozygous hemoglobin variants (HbS, HgC, etc)do  not significantly interfere with this assay.   However, presence of multiple variants may affect accuracy.             Patient Active Problem List   Diagnosis    Coronary artery disease involving native coronary artery of native heart without angina pectoris    BDR (background diabetic retinopathy) - Both Eyes    Uncontrolled type II diabetes mellitus    PVD (peripheral vascular disease)    Sleep apnea    Proteinuria    Edema    Hypertension  associated with diabetes    Type 2 diabetes mellitus with diabetic peripheral angiopathy without gangrene, with long-term current use of insulin    Onychomycosis of multiple toenails with type 2 diabetes mellitus    Type 2 diabetes mellitus 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, with long-term current use of insulin    Hyponatremia    Anemia    Hypomagnesemia    Diabetic foot ulcer associated with type 2 diabetes mellitus    Insomnia    Hx of colon cancer, stage I    Dyslipidemia associated with type 2 diabetes mellitus    Diabetes mellitus with insulin therapy    Obesity, diabetes, and hypertension syndrome    Class 2 severe obesity with body mass index (BMI) of 35 to 39.9 with serious comorbidity    Diabetes mellitus with microalbuminuria    Diabetes mellitus with peripheral circulatory disorder    Diabetic foot infection    Coronary artery disease involving coronary bypass graft of native heart without angina pectoris    Osteomyelitis of left foot       Current Outpatient Medications on File Prior to Visit   Medication Sig Dispense Refill    acetaminophen (TYLENOL) 325 MG tablet Take 325 mg by mouth every 6 (six) hours as needed for Pain.      aspirin (ECOTRIN) 81 MG EC tablet Take 1 tablet (81 mg total) by mouth once daily.  0    aspirin/salicylamide/caffeine (BC HEADACHE POWDER ORAL) Take 1 Package by mouth daily as needed (pain).      atorvastatin (LIPITOR) 80 MG tablet TAKE ONE TABLET BY MOUTH ONCE DAILY.  30 tablet 11    blood sugar diagnostic (ACCU-CHEK GUIDE TEST STRIPS) Strp 1 each by Misc.(Non-Drug; Combo Route) route 5 (five) times daily. 150 each 11    blood-glucose sensor (GUARDIAN SENSOR 3) Haley 1 each by Misc.(Non-Drug; Combo Route) route every 7 days. To use with Guardian Transmitter, change every 7 days - using in conjunction with Xsigo 770 insulin pump - in management of T2dm - #E11.65 5 each 12    blood-glucose  "transmitter (GUARDIAN LINK 3 TRANSMITTER) Haley 1 each by Misc.(Non-Drug; Combo Route) route continuous. Use transmitter daily - with Medtronic 770 insulin pump - medically necessary in management of T2DM #E11.65 1 Device 0    clotrimazole-betamethasone 1-0.05% (LOTRISONE) cream Apply topically 2 (two) times daily. 45 g 2    doxycycline (VIBRA-TABS) 100 MG tablet Take 1 tablet (100 mg total) by mouth 2 (two) times daily. 20 tablet 0    empagliflozin (JARDIANCE) 25 mg tablet Take 1 tablet (25 mg total) by mouth once daily. 30 tablet 3    ibuprofen (ADVIL,MOTRIN) 200 MG tablet Take 200 mg by mouth every 6 (six) hours as needed for Pain.      insulin aspart U-100 (NOVOLOG U-100 INSULIN ASPART) 100 unit/mL injection Inject 70 Units into the skin continuous. 40 mL 3    insulin lispro 100 unit/mL injection Inject 70 Units into the skin continuous. 40 mL 3    lancets (ACCU-CHEK FASTCLIX LANCET DRUM) Misc 1 each by Misc.(Non-Drug; Combo Route) route Daily. 30 each 11    lisinopriL (PRINIVIL,ZESTRIL) 40 MG tablet TAKE ONE TABLET BY MOUTH ONCE DAILY.  30 tablet 11    melatonin (MELATIN) 3 mg tablet Take 2 tablets (6 mg total) by mouth nightly as needed for Insomnia.  0    metFORMIN (GLUCOPHAGE) 1000 MG tablet Take 1 tablet (1,000 mg total) by mouth 2 (two) times daily with meals. 60 tablet 11    MINIMED 770G INSULIN PUMP Misc 1 each by Misc.(Non-Drug; Combo Route) route continuous. Medically necessary for management of Type 2 diabetes, E11.65 1 each 0    pen needle, diabetic 31 gauge x 3/16" Ndle Inject 1 each into the skin 3 (three) times daily before meals. (Patient taking differently: Inject 1 each into the skin 4 (four) times daily.) 100 each 12    carvediloL (COREG) 12.5 MG tablet Take 1 tablet (12.5 mg total) by mouth 2 (two) times daily. 60 tablet 11    insulin (LANTUS SOLOSTAR U-100 INSULIN) glargine 100 units/mL (3mL) SubQ pen Inject 60 Units into the skin every evening. 4 Box 3    insulin regular 100 " unit/mL Inj injection Inject 25 Units into the skin 3 (three) times daily before meals. 10 mL 11     No current facility-administered medications on file prior to visit.       Review of patient's allergies indicates:   Allergen Reactions    Penicillins Other (See Comments)     PCN allergy as a child - was told he went into a coma. Tolerates Cefazolin without adverse reactions    Penicillin     Shellfish containing products      Other reaction(s): Unknown    Vancomycin Itching     Tolerated vancomycin 7/2020    Bactrim  [sulfamethoxazole-trimethoprim] Rash       Past Surgical History:   Procedure Laterality Date    COLONOSCOPY N/A 2/17/2017    Procedure: COLONOSCOPY;  Surgeon: Simon Gomez MD;  Location: [facility] (4TH FLR);  Service: Endoscopy;  Laterality: N/A;    CORONARY ANGIOPLASTY      INCISION AND DRAINAGE FOOT Right 6/5/2018    Procedure: INCISION AND DRAINAGE, FOOT;  Surgeon: Bridget Santana DPM;  Location: [facility] OR 1ST FLR;  Service: Podiatry;  Laterality: Right;  Request mini C arm in room. request wound vac with medium black sponge    INCISION AND DRAINAGE FOOT Right 6/7/2018    Procedure: INCISION AND DRAINAGE, FOOT;  Surgeon: Emelia Brock DPM;  Location: [facility] OR 2ND FLR;  Service: Podiatry;  Laterality: Right;    INCISION AND DRAINAGE FOOT Left 9/4/2020    Procedure: INCISION AND DRAINAGE, FOOT;  Surgeon: Ainsley Powell DPM;  Location: [facility] OR 2ND FLR;  Service: Podiatry;  Laterality: Left;    INCISION AND DRAINAGE FOOT Left 12/22/2020    Procedure: INCISION AND DRAINAGE, FOOT;  Surgeon: Ainsley Powell DPM;  [facility];  Service: Podiatry;  Laterality: Left;  May need micro choice  Need Jam shidi needles  Stretcher OK      INCISION AND DRAINAGE OF ABSCESS Right 6/2/2018    Procedure: INCISION AND DRAINAGE, ABSCESS;  Surgeon: Bridget Santana DPM;  Location: [facility] OR [address] [place] [address];  Service: General;  Laterality: Right;    OSTEOTOMY OF METATARSAL BONE  9/4/2020     Procedure: OSTEOTOMY, METATARSAL BONE, 1st METATARSAL RESECTION;  Surgeon: Ainsley Powell DPM;  Location: [facility] OR 2ND FLR;  Service: Podiatry;;    REMOVAL OF FOREIGN BODY FROM FOOT Right 6/7/2018    Procedure: REMOVAL, FOREIGN BODY, FOOT;  Surgeon: Emelia Brock DPM;  Location: [facility] OR 2ND FLR;  Service: Podiatry;  Laterality: Right;    TOE AMPUTATION Left 7/4/2020    Procedure: AMPUTATION, TOE;  Surgeon: Bridget Santana DPM;  Location: [facility] OR 2ND FLR;  Service: Podiatry;  Laterality: Left;    TOE AMPUTATION Left 10/14/2020    Procedure: AMPUTATION, TOE;  Surgeon: Mingo Melara DPM;  Location: [facility] OR 2ND FLR;  Service: Podiatry;  Laterality: Left;  stretcher OK    TONSILLECTOMY         Family History   Problem Relation Age of Onset    Heart disease Mother     Diabetes Mother     Diabetes Father     Heart disease Brother     Diabetes Maternal Grandmother     Diabetes Maternal Grandfather     Diabetes Paternal Grandmother     Diabetes Paternal Grandfather     Anesthesia problems Neg Hx        Social History     Socioeconomic History    Marital status:    Tobacco Use    Smoking status: Never Smoker    Smokeless tobacco: Never Used   Substance and Sexual Activity    Alcohol use: Yes     Comment: rare x1 beer-     Drug use: No    Sexual activity: Not Currently           Review of Systems   Constitutional: Negative for chills, fever, malaise/fatigue and night sweats.   Cardiovascular: Negative for claudication, cyanosis and leg swelling.   Skin: Positive for poor wound healing. Negative for color change, itching, rash and suspicious lesions.   Musculoskeletal: Negative for falls, gout, joint pain and myalgias.   Neurological: Positive for numbness and sensory change.           Objective:       Vitals:    12/28/21 0858   BP: (!) 165/89   Pulse: 60   Weight: 118.4 kg (261 lb 0.4 oz)   PainSc: 0-No pain       Physical Exam  Constitutional:       General: He is not in acute distress.      Appearance: He is well-developed. He is not diaphoretic.   Cardiovascular:      Pulses:           Dorsalis pedis pulses are 1+ on the right side and 1+ on the left side.        Posterior tibial pulses are 1+ on the right side and 1+ on the left side.      Comments: Toes warm, pink, cap fill <5 seconds.  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     Skin:     General: Skin is warm and dry.      Coloration: Skin is not pale.      Findings: see wound description below    Neurological:      Comments: Diminished/loss of protective sensation all toes bilateral to 10 gram monofilament.     Psychiatric:         Behavior: Behavior is cooperative.         Ulcer location: plantar partial 1st ray amputation stump, left  Measurements : 0.2x0.2x0.1cm , unchanged since last visit  Signs of infection:local edema, malodor  Drainage: Sero-Sanguinous  Purulence: no  Crepitus/fluctuance: no  Periwound: Reddened, Macerated, Calloused  Base: Mixed Granular/Fibrotic  Depth: subcutaneous tissue  Probe to bone: no                  Assessment:       Encounter Diagnoses   Name Primary?    Ulcer of left foot, with fat layer exposed Yes    Ulcer of right foot with fat layer exposed          Plan:       Billy was seen today for wound care.    Diagnoses and all orders for this visit:    Ulcer of left foot, with fat layer exposed    Ulcer of right foot with fat layer exposed        Education about the prevention of limb loss.    Discussed wound healing cycle, skin integrity, ways to care for skin.Counseled patient on the effects of high blood glucose on healing. He verbalizes understanding that it can increase the chances of delayed healing and this prolonged exposure leads to infection or progression of infection which subsequently can result in loss of limb.    Adequate  "vitamin supplementation, protein intake, and hydration - discussed with patient    Wound Debridement    Performed by: Mingo Melara DPM   Authorized by: Patient    Time out: Immediately prior to procedure a "time out" was called to verify the correct patient, procedure, equipment, support staff and site/side marked as required.   Consent Done?:  Yes (Verbal)  Local anesthesia used?: No       Wound Details:    Location:   left foot    Type of Debridement:  Excisional       Length (cm):   0.2       Width (cm):   0.2       Depth (cm):   0.1       Percent Debrided (%):  100             Depth of debridement:  Subcutaneous tissue    Tissue debrided:  Dermis, Epidermis and Subcutaneous    Devitalized tissue debrided:  Biofilm, Callus and Necrotic/Eschar    Instruments:  Blade, Curette and Nippers     Bleeding:  Minimal  Hemostasis Achieved: Yes    Method Used:  Pressure  Patient tolerance:  Patient tolerated the procedure well with no immediate complications      The nature of the condition, options for management, as well as potential risks and complications were discussed in detail with patient. Patient was amenable to my recommendations and left my office fully informed and will follow up as instructed or sooner if necessary.      Area dressed with Iodosorb as well as Leticia/collagen dressing followed by football dressings and Darco shoes.  Continue current wound care and follow-up in 2 weeks.                                      "

## 2022-01-07 ENCOUNTER — PATIENT OUTREACH (OUTPATIENT)
Dept: ADMINISTRATIVE | Facility: OTHER | Age: 57
End: 2022-01-07
Payer: COMMERCIAL

## 2022-01-11 ENCOUNTER — OFFICE VISIT (OUTPATIENT)
Dept: PODIATRY | Facility: CLINIC | Age: 57
End: 2022-01-11
Payer: COMMERCIAL

## 2022-01-11 VITALS
WEIGHT: 261 LBS | HEART RATE: 62 BPM | DIASTOLIC BLOOD PRESSURE: 78 MMHG | SYSTOLIC BLOOD PRESSURE: 142 MMHG | BODY MASS INDEX: 35.4 KG/M2

## 2022-01-11 DIAGNOSIS — L97.522 FOOT ULCER, LEFT, WITH FAT LAYER EXPOSED: ICD-10-CM

## 2022-01-11 DIAGNOSIS — L97.512 FOOT ULCER, RIGHT, WITH FAT LAYER EXPOSED: ICD-10-CM

## 2022-01-11 DIAGNOSIS — E11.49 TYPE II DIABETES MELLITUS WITH NEUROLOGICAL MANIFESTATIONS: Primary | ICD-10-CM

## 2022-01-11 PROCEDURE — 99213 OFFICE O/P EST LOW 20 MIN: CPT | Mod: 25,S$GLB,, | Performed by: PODIATRIST

## 2022-01-11 PROCEDURE — 3008F BODY MASS INDEX DOCD: CPT | Mod: CPTII,S$GLB,, | Performed by: PODIATRIST

## 2022-01-11 PROCEDURE — 1160F RVW MEDS BY RX/DR IN RCRD: CPT | Mod: CPTII,S$GLB,, | Performed by: PODIATRIST

## 2022-01-11 PROCEDURE — 11042 DBRDMT SUBQ TIS 1ST 20SQCM/<: CPT | Mod: S$GLB,,, | Performed by: PODIATRIST

## 2022-01-11 PROCEDURE — 99999 PR PBB SHADOW E&M-EST. PATIENT-LVL IV: ICD-10-PCS | Mod: PBBFAC,,, | Performed by: PODIATRIST

## 2022-01-11 PROCEDURE — 3008F PR BODY MASS INDEX (BMI) DOCUMENTED: ICD-10-PCS | Mod: CPTII,S$GLB,, | Performed by: PODIATRIST

## 2022-01-11 PROCEDURE — 3077F SYST BP >= 140 MM HG: CPT | Mod: CPTII,S$GLB,, | Performed by: PODIATRIST

## 2022-01-11 PROCEDURE — 1159F MED LIST DOCD IN RCRD: CPT | Mod: CPTII,S$GLB,, | Performed by: PODIATRIST

## 2022-01-11 PROCEDURE — 1160F PR REVIEW ALL MEDS BY PRESCRIBER/CLIN PHARMACIST DOCUMENTED: ICD-10-PCS | Mod: CPTII,S$GLB,, | Performed by: PODIATRIST

## 2022-01-11 PROCEDURE — 99213 PR OFFICE/OUTPT VISIT, EST, LEVL III, 20-29 MIN: ICD-10-PCS | Mod: 25,S$GLB,, | Performed by: PODIATRIST

## 2022-01-11 PROCEDURE — 3078F DIAST BP <80 MM HG: CPT | Mod: CPTII,S$GLB,, | Performed by: PODIATRIST

## 2022-01-11 PROCEDURE — 1159F PR MEDICATION LIST DOCUMENTED IN MEDICAL RECORD: ICD-10-PCS | Mod: CPTII,S$GLB,, | Performed by: PODIATRIST

## 2022-01-11 PROCEDURE — 3077F PR MOST RECENT SYSTOLIC BLOOD PRESSURE >= 140 MM HG: ICD-10-PCS | Mod: CPTII,S$GLB,, | Performed by: PODIATRIST

## 2022-01-11 PROCEDURE — 3078F PR MOST RECENT DIASTOLIC BLOOD PRESSURE < 80 MM HG: ICD-10-PCS | Mod: CPTII,S$GLB,, | Performed by: PODIATRIST

## 2022-01-11 PROCEDURE — 11042 WOUND DEBRIDEMENT: ICD-10-PCS | Mod: S$GLB,,, | Performed by: PODIATRIST

## 2022-01-11 PROCEDURE — 99999 PR PBB SHADOW E&M-EST. PATIENT-LVL IV: CPT | Mod: PBBFAC,,, | Performed by: PODIATRIST

## 2022-01-11 NOTE — PROCEDURES
"Wound Debridement    Date/Time: 1/11/2022 8:45 AM  Performed by: Emelia Brock DPM  Authorized by: Emelia Brock DPM     Time out: Immediately prior to procedure a "time out" was called to verify the correct patient, procedure, equipment, support staff and site/side marked as required.    Consent Done?:  Yes (Verbal)    Preparation: Patient was prepped and draped in usual sterile fashion    Local anesthesia used?: No      Wound Details:    Location:  Left foot    Location:  Left 1st Metatarsal Head    Type of Debridement:  Excisional       Length (cm):  0.4       Area (sq cm):  0.16       Width (cm):  0.4       Percent Debrided (%):  100       Depth (cm):  0.1       Total Area Debrided (sq cm):  0.16    Depth of debridement:  Subcutaneous tissue    Tissue debrided:  Dermis, Epidermis and Subcutaneous    Devitalized tissue debrided:  Biofilm and Fibrin    Instruments:  Curette    Additional wounds:  1    2nd Wound Details:     Location:  Right foot    Location:  Right 3rd Metatarsal Head    Location:  Right 3rd Metatarsal Head    Type of Debridement:  Excisional       Length (cm):  1       Area (sq cm):  0.8       Width (cm):  0.8       Percent Debrided (%):  100       Depth (cm):  0.1       Total Area Debrided (sq cm):  0.8    Depth of debridement:  Subcutaneous tissue    Tissue debrided:  Dermis, Epidermis and Subcutaneous    Devitalized tissue debrided:  Biofilm, Callus and Fibrin    Instruments:  Curette    Bleeding:  Moderate  Hemostasis Achieved: Yes    Method Used:  Pressure  Patient tolerance:  Patient tolerated the procedure well with no immediate complications      "

## 2022-01-11 NOTE — PROGRESS NOTES
Subjective:      Patient ID: Billy Rivera is a 56 y.o. male.    Chief Complaint: Follow-up (Right foot)    Chronic wound left and right foot.  Changing dressing daily at home every other day.  Ambulating in surgical shoe left  casual shoe right. No new complaints today.  He has been alternating Iodosorb and Medihoney.  He continues to be on his feet quite often due to work. Sees Dr. Melara but seeing me this week.       Hemoglobin A1C   Date Value Ref Range Status   12/22/2020 10.5 (H) 4.0 - 5.6 % Final     Comment:     ADA Screening Guidelines:  5.7-6.4%  Consistent with prediabetes  >or=6.5%  Consistent with diabetes  High levels of fetal hemoglobin interfere with the HbA1C  assay. Heterozygous hemoglobin variants (HbS, HgC, etc)do  not significantly interfere with this assay.   However, presence of multiple variants may affect accuracy.     10/13/2020 10.8 (H) 4.0 - 5.6 % Final     Comment:     ADA Screening Guidelines:  5.7-6.4%  Consistent with prediabetes  >or=6.5%  Consistent with diabetes  High levels of fetal hemoglobin interfere with the HbA1C  assay. Heterozygous hemoglobin variants (HbS, HgC, etc)do  not significantly interfere with this assay.   However, presence of multiple variants may affect accuracy.     08/12/2020 11.5 (H) 4.0 - 5.6 % Final     Comment:     ADA Screening Guidelines:  5.7-6.4%  Consistent with prediabetes  >or=6.5%  Consistent with diabetes  High levels of fetal hemoglobin interfere with the HbA1C  assay. Heterozygous hemoglobin variants (HbS, HgC, etc)do  not significantly interfere with this assay.   However, presence of multiple variants may affect accuracy.             Patient Active Problem List   Diagnosis    Coronary artery disease involving native coronary artery of native heart without angina pectoris    BDR (background diabetic retinopathy) - Both Eyes    Uncontrolled type II diabetes mellitus    PVD (peripheral vascular disease)    Sleep apnea    Proteinuria     Edema    Hypertension associated with diabetes    Type 2 diabetes mellitus with diabetic peripheral angiopathy without gangrene, with long-term current use of insulin    Onychomycosis of multiple toenails with type 2 diabetes mellitus    Type 2 diabetes mellitus 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, with long-term current use of insulin    Hyponatremia    Anemia    Hypomagnesemia    Diabetic foot ulcer associated with type 2 diabetes mellitus    Insomnia    Hx of colon cancer, stage I    Dyslipidemia associated with type 2 diabetes mellitus    Diabetes mellitus with insulin therapy    Obesity, diabetes, and hypertension syndrome    Class 2 severe obesity with body mass index (BMI) of 35 to 39.9 with serious comorbidity    Diabetes mellitus with microalbuminuria    Diabetes mellitus with peripheral circulatory disorder    Diabetic foot infection    Coronary artery disease involving coronary bypass graft of native heart without angina pectoris    Osteomyelitis of left foot       Current Outpatient Medications on File Prior to Visit   Medication Sig Dispense Refill    acetaminophen (TYLENOL) 325 MG tablet Take 325 mg by mouth every 6 (six) hours as needed for Pain.      aspirin (ECOTRIN) 81 MG EC tablet Take 1 tablet (81 mg total) by mouth once daily.  0    aspirin/salicylamide/caffeine (BC HEADACHE POWDER ORAL) Take 1 Package by mouth daily as needed (pain).      atorvastatin (LIPITOR) 80 MG tablet TAKE ONE TABLET BY MOUTH ONCE DAILY.  30 tablet 11    blood sugar diagnostic (ACCU-CHEK GUIDE TEST STRIPS) Strp 1 each by Misc.(Non-Drug; Combo Route) route 5 (five) times daily. 150 each 11    blood-glucose sensor (GUARDIAN SENSOR 3) Haley 1 each by Misc.(Non-Drug; Combo Route) route every 7 days. To use with Guardian Transmitter, change every 7 days - using in conjunction with Covocative 770 insulin pump - in management of T2dm - #E11.65 5 each  "12    blood-glucose transmitter (GUARDIAN LINK 3 TRANSMITTER) Haley 1 each by Misc.(Non-Drug; Combo Route) route continuous. Use transmitter daily - with [employer] 770 insulin pump - medically necessary in management of T2DM #E11.65 1 Device 0    clotrimazole-betamethasone 1-0.05% (LOTRISONE) cream Apply topically 2 (two) times daily. 45 g 2    doxycycline (VIBRA-TABS) 100 MG tablet Take 1 tablet (100 mg total) by mouth 2 (two) times daily. 20 tablet 0    empagliflozin (JARDIANCE) 25 mg tablet Take 1 tablet (25 mg total) by mouth once daily. 30 tablet 3    ibuprofen (ADVIL,MOTRIN) 200 MG tablet Take 200 mg by mouth every 6 (six) hours as needed for Pain.      insulin lispro 100 unit/mL injection Inject 70 Units into the skin continuous. 40 mL 3    lancets (ACCU-CHEK FASTCLIX LANCET DRUM) Misc 1 each by Misc.(Non-Drug; Combo Route) route Daily. 30 each 11    lisinopriL (PRINIVIL,ZESTRIL) 40 MG tablet TAKE ONE TABLET BY MOUTH ONCE DAILY.  30 tablet 11    melatonin (MELATIN) 3 mg tablet Take 2 tablets (6 mg total) by mouth nightly as needed for Insomnia.  0    metFORMIN (GLUCOPHAGE) 1000 MG tablet Take 1 tablet (1,000 mg total) by mouth 2 (two) times daily with meals. 60 tablet 11    pen needle, diabetic 31 gauge x 3/16" Ndle Inject 1 each into the skin 3 (three) times daily before meals. (Patient taking differently: Inject 1 each into the skin 4 (four) times daily.) 100 each 12    carvediloL (COREG) 12.5 MG tablet Take 1 tablet (12.5 mg total) by mouth 2 (two) times daily. 60 tablet 11    insulin (LANTUS SOLOSTAR U-100 INSULIN) glargine 100 units/mL (3mL) SubQ pen Inject 60 Units into the skin every evening. 4 Box 3    insulin aspart U-100 (NOVOLOG U-100 INSULIN ASPART) 100 unit/mL injection Inject 70 Units into the skin continuous. 40 mL 3    insulin regular 100 unit/mL Inj injection Inject 25 Units into the skin 3 (three) times daily before meals. 10 mL 11    MINIMED 770G INSULIN PUMP Misc 1 each by " Misc.(Non-Drug; Combo Route) route continuous. Medically necessary for management of Type 2 diabetes, E11.65 1 each 0     No current facility-administered medications on file prior to visit.       Review of patient's allergies indicates:   Allergen Reactions    Penicillins Other (See Comments)     PCN allergy as a child - was told he went into a coma. Tolerates Cefazolin without adverse reactions    Penicillin     Shellfish containing products      Other reaction(s): Unknown    Vancomycin Itching     Tolerated vancomycin 7/2020    Bactrim  [sulfamethoxazole-trimethoprim] Rash       Past Surgical History:   Procedure Laterality Date    COLONOSCOPY N/A 2/17/2017    Procedure: COLONOSCOPY;  Surgeon: Simon Gomez MD;  Location: [facility] (4TH FLR);  Service: Endoscopy;  Laterality: N/A;    CORONARY ANGIOPLASTY      INCISION AND DRAINAGE FOOT Right 6/5/2018    Procedure: INCISION AND DRAINAGE, FOOT;  Surgeon: Bridget Santana DPM;  [facility];  Service: Podiatry;  Laterality: Right;  Request mini C arm in room. request wound vac with medium black sponge    INCISION AND DRAINAGE FOOT Right 6/7/2018    Procedure: INCISION AND DRAINAGE, FOOT;  Surgeon: Emelia Brock DPM;  Location: [facility] OR 2ND FLR;  Service: Podiatry;  Laterality: Right;    INCISION AND DRAINAGE FOOT Left 9/4/2020    Procedure: INCISION AND DRAINAGE, FOOT;  Surgeon: Ainsley Powell DPM;  Location: [facility] OR 2ND FLR;  Service: Podiatry;  Laterality: Left;    INCISION AND DRAINAGE FOOT Left 12/22/2020    Procedure: INCISION AND DRAINAGE, FOOT;  Surgeon: Ainsley Powell DPM;  [facility];  Service: Podiatry;  Laterality: Left;  May need micro choice  Need Jam shidi needles  Stretcher OK      INCISION AND DRAINAGE OF ABSCESS Right 6/2/2018    Procedure: INCISION AND DRAINAGE, ABSCESS;  Surgeon: Bridget Santana DPM;  Location: [facility] OR [address] [place] [address];  Service: General;  Laterality: Right;    OSTEOTOMY OF METATARSAL BONE   9/4/2020    Procedure: OSTEOTOMY, METATARSAL BONE, 1st METATARSAL RESECTION;  Surgeon: Ainsley Powell DPM;  Location: [facility] OR 2ND FLR;  Service: Podiatry;;    REMOVAL OF FOREIGN BODY FROM FOOT Right 6/7/2018    Procedure: REMOVAL, FOREIGN BODY, FOOT;  Surgeon: Emelia Brock DPM;  Location: [facility] OR 2ND FLR;  Service: Podiatry;  Laterality: Right;    TOE AMPUTATION Left 7/4/2020    Procedure: AMPUTATION, TOE;  Surgeon: Bridget Santana DPM;  Location: [facility] OR 2ND FLR;  Service: Podiatry;  Laterality: Left;    TOE AMPUTATION Left 10/14/2020    Procedure: AMPUTATION, TOE;  Surgeon: Mingo Melara DPM;  Location: [facility] FLR;  Service: Podiatry;  Laterality: Left;  stretcher OK    TONSILLECTOMY         Family History   Problem Relation Age of Onset    Heart disease Mother     Diabetes Mother     Diabetes Father     Heart disease Brother     Diabetes Maternal Grandmother     Diabetes Maternal Grandfather     Diabetes Paternal Grandmother     Diabetes Paternal Grandfather     Anesthesia problems Neg Hx        Social History     Socioeconomic History    Marital status:    Tobacco Use    Smoking status: Never Smoker    Smokeless tobacco: Never Used   Substance and Sexual Activity    Alcohol use: Yes     Comment: rare x1 beer-     Drug use: No    Sexual activity: Not Currently           Review of Systems   Constitutional: Negative for chills, fever, malaise/fatigue and night sweats.   Cardiovascular: Negative for claudication, cyanosis and leg swelling.   Skin: Positive for poor wound healing. Negative for color change, itching, rash and suspicious lesions.   Musculoskeletal: Negative for falls, gout, joint pain and myalgias.   Neurological: Positive for numbness and sensory change.           Objective:       Vitals:    01/11/22 0844   BP: (!) 142/78   Pulse: 62   Weight: 118.4 kg (261 lb 0.4 oz)   PainSc: 0-No pain       Physical Exam  Constitutional:       General: He is not in acute  distress.     Appearance: He is well-developed. He is not diaphoretic.   Cardiovascular:      Pulses:           Dorsalis pedis pulses are 1+ on the right side and 1+ on the left side.        Posterior tibial pulses are 1+ on the right side and 1+ on the left side.      Comments: Toes warm, pink, cap fill <5 seconds.  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     Skin:     General: Skin is warm and dry.      Coloration: Skin is not pale.      Findings: see wound description below    Neurological:      Comments: Diminished/loss of protective sensation all toes bilateral to 10 gram monofilament.     Psychiatric:         Behavior: Behavior is cooperative.         Ulcer location: plantar partial 1st ray amputation stump, left  Measurements : 0.4x0.4x0.1cm ,    Signs of infection:local edema, malodor  Drainage: Sero-Sanguinous  Purulence: no  Crepitus/fluctuance: no  Periwound: Reddened, Macerated, Calloused  Base: Mixed Granular/Fibrotic  Depth: subcutaneous tissue  Probe to bone: no    Ulcer location: plantar 3rd met head R   Measurements : 1.0 x 0.8 x 0.1cm     Signs of infection: none  Drainage: Sero-Sanguinous  Purulence: no  Crepitus/fluctuance: no  Periwound:  Calloused  Base: Mixed Granular/Fibrotic  Depth: subcutaneous tissue  Probe to bone: no           Assessment:       Encounter Diagnoses   Name Primary?    Type II diabetes mellitus with neurological manifestations Yes    Foot ulcer, left, with fat layer exposed     Foot ulcer, right, with fat layer exposed          Plan:       Billy was seen today for follow-up.    Diagnoses and all orders for this visit:    Type II diabetes mellitus with neurological manifestations    Foot ulcer, left, with fat layer exposed    Foot ulcer, right, with fat layer exposed        Education about the  prevention of limb loss.    Discussed wound healing cycle, skin integrity, ways to care for skin.Counseled patient on the effects of high blood glucose on healing. He verbalizes understanding that it can increase the chances of delayed healing and this prolonged exposure leads to infection or progression of infection which subsequently can result in loss of limb.    Adequate vitamin supplementation, protein intake, and hydration - discussed with patient     Excisional debridement preformed of wound today. See separate procedure note    Iodosorb to both wounds, covered with wound foam, well padded football dressing. Darco shoe bilateral. Advised to keep clean, dry and intact until follow up.    RTC 1 week, sooner PRN

## 2022-01-21 ENCOUNTER — TELEPHONE (OUTPATIENT)
Dept: INTERNAL MEDICINE | Facility: CLINIC | Age: 57
End: 2022-01-21
Payer: COMMERCIAL

## 2022-01-21 RX ORDER — INSULIN ASPART 100 [IU]/ML
70 INJECTION, SOLUTION INTRAVENOUS; SUBCUTANEOUS CONTINUOUS
Qty: 40 ML | Refills: 3 | Status: SHIPPED | OUTPATIENT
Start: 2022-01-21 | End: 2022-01-24 | Stop reason: SDUPTHER

## 2022-01-21 NOTE — TELEPHONE ENCOUNTER
----- Message from Grace Musa sent at 1/21/2022  4:52 PM CST -----  Contact: [phone]  Patient states he is completely out of insulin and the pharmacy is charging  him trina $200 for insulin. Please call and advise

## 2022-01-24 ENCOUNTER — TELEPHONE (OUTPATIENT)
Dept: INTERNAL MEDICINE | Facility: CLINIC | Age: 57
End: 2022-01-24
Payer: COMMERCIAL

## 2022-01-24 DIAGNOSIS — Z79.4 TYPE 2 DIABETES MELLITUS WITH BOTH EYES AFFECTED BY MODERATE NONPROLIFERATIVE RETINOPATHY WITHOUT MACULAR EDEMA, WITH LONG-TERM CURRENT USE OF INSULIN: Primary | ICD-10-CM

## 2022-01-24 DIAGNOSIS — E11.3393 TYPE 2 DIABETES MELLITUS WITH BOTH EYES AFFECTED BY MODERATE NONPROLIFERATIVE RETINOPATHY WITHOUT MACULAR EDEMA, WITH LONG-TERM CURRENT USE OF INSULIN: Primary | ICD-10-CM

## 2022-01-24 RX ORDER — LISINOPRIL 40 MG/1
TABLET ORAL
Qty: 30 TABLET | Refills: 0 | Status: SHIPPED | OUTPATIENT
Start: 2022-01-24 | End: 2022-03-07

## 2022-01-24 RX ORDER — ATORVASTATIN CALCIUM 80 MG/1
TABLET, FILM COATED ORAL
Qty: 30 TABLET | Refills: 0 | Status: SHIPPED | OUTPATIENT
Start: 2022-01-24 | End: 2022-03-01

## 2022-01-24 RX ORDER — INSULIN GLARGINE 100 [IU]/ML
60 INJECTION, SOLUTION SUBCUTANEOUS NIGHTLY
Qty: 60 ML | Refills: 3 | Status: SHIPPED | OUTPATIENT
Start: 2022-01-24 | End: 2022-01-24

## 2022-01-24 RX ORDER — INSULIN ASPART 100 [IU]/ML
70 INJECTION, SOLUTION INTRAVENOUS; SUBCUTANEOUS CONTINUOUS
Qty: 40 ML | Refills: 3 | Status: SHIPPED | OUTPATIENT
Start: 2022-01-24 | End: 2022-02-09

## 2022-01-24 NOTE — TELEPHONE ENCOUNTER
----- Message from Michelle Jefferson MA sent at 1/21/2022  5:12 PM CST -----  Contact: [phone]    ----- Message -----  From: Lilibeth Kraus  Sent: 1/21/2022   3:49 PM CST  To: Andre PARRA Staff    Patient is asking for a courtesy call from NP Andre - did not specify but stated that is urgent, extremely urgent. Thank you

## 2022-01-26 ENCOUNTER — OFFICE VISIT (OUTPATIENT)
Dept: PODIATRY | Facility: CLINIC | Age: 57
End: 2022-01-26
Payer: COMMERCIAL

## 2022-01-26 ENCOUNTER — LAB VISIT (OUTPATIENT)
Dept: LAB | Facility: HOSPITAL | Age: 57
End: 2022-01-26
Attending: INTERNAL MEDICINE
Payer: COMMERCIAL

## 2022-01-26 VITALS
SYSTOLIC BLOOD PRESSURE: 156 MMHG | WEIGHT: 261 LBS | HEIGHT: 72 IN | DIASTOLIC BLOOD PRESSURE: 79 MMHG | BODY MASS INDEX: 35.35 KG/M2 | HEART RATE: 54 BPM

## 2022-01-26 DIAGNOSIS — E11.49 TYPE II DIABETES MELLITUS WITH NEUROLOGICAL MANIFESTATIONS: Primary | ICD-10-CM

## 2022-01-26 DIAGNOSIS — L97.522 FOOT ULCER, LEFT, WITH FAT LAYER EXPOSED: ICD-10-CM

## 2022-01-26 DIAGNOSIS — E11.9 DIABETES MELLITUS WITH INSULIN THERAPY: ICD-10-CM

## 2022-01-26 DIAGNOSIS — Z79.4 DIABETES MELLITUS WITH INSULIN THERAPY: ICD-10-CM

## 2022-01-26 DIAGNOSIS — Z12.5 PROSTATE CANCER SCREENING: ICD-10-CM

## 2022-01-26 DIAGNOSIS — L97.512 FOOT ULCER, RIGHT, WITH FAT LAYER EXPOSED: ICD-10-CM

## 2022-01-26 DIAGNOSIS — I15.2 HYPERTENSION ASSOCIATED WITH DIABETES: ICD-10-CM

## 2022-01-26 DIAGNOSIS — E11.69 DYSLIPIDEMIA ASSOCIATED WITH TYPE 2 DIABETES MELLITUS: ICD-10-CM

## 2022-01-26 DIAGNOSIS — E78.5 DYSLIPIDEMIA ASSOCIATED WITH TYPE 2 DIABETES MELLITUS: ICD-10-CM

## 2022-01-26 DIAGNOSIS — E11.59 HYPERTENSION ASSOCIATED WITH DIABETES: ICD-10-CM

## 2022-01-26 LAB
ALBUMIN SERPL BCP-MCNC: 3.6 G/DL (ref 3.5–5.2)
ALP SERPL-CCNC: 119 U/L (ref 55–135)
ALT SERPL W/O P-5'-P-CCNC: 39 U/L (ref 10–44)
ANION GAP SERPL CALC-SCNC: 9 MMOL/L (ref 8–16)
AST SERPL-CCNC: 26 U/L (ref 10–40)
BASOPHILS # BLD AUTO: 0.07 K/UL (ref 0–0.2)
BASOPHILS NFR BLD: 0.8 % (ref 0–1.9)
BILIRUB SERPL-MCNC: 0.6 MG/DL (ref 0.1–1)
BUN SERPL-MCNC: 30 MG/DL (ref 6–20)
CALCIUM SERPL-MCNC: 9.2 MG/DL (ref 8.7–10.5)
CHLORIDE SERPL-SCNC: 103 MMOL/L (ref 95–110)
CHOLEST SERPL-MCNC: 175 MG/DL (ref 120–199)
CHOLEST/HDLC SERPL: 4.1 {RATIO} (ref 2–5)
CO2 SERPL-SCNC: 26 MMOL/L (ref 23–29)
COMPLEXED PSA SERPL-MCNC: 0.33 NG/ML (ref 0–4)
CREAT SERPL-MCNC: 1.3 MG/DL (ref 0.5–1.4)
DIFFERENTIAL METHOD: ABNORMAL
EOSINOPHIL # BLD AUTO: 0.3 K/UL (ref 0–0.5)
EOSINOPHIL NFR BLD: 3.6 % (ref 0–8)
ERYTHROCYTE [DISTWIDTH] IN BLOOD BY AUTOMATED COUNT: 12.6 % (ref 11.5–14.5)
EST. GFR  (AFRICAN AMERICAN): >60 ML/MIN/1.73 M^2
EST. GFR  (NON AFRICAN AMERICAN): >60 ML/MIN/1.73 M^2
ESTIMATED AVG GLUCOSE: 303 MG/DL (ref 68–131)
GLUCOSE SERPL-MCNC: 265 MG/DL (ref 70–110)
HBA1C MFR BLD: 12.2 % (ref 4–5.6)
HCT VFR BLD AUTO: 45 % (ref 40–54)
HDLC SERPL-MCNC: 43 MG/DL (ref 40–75)
HDLC SERPL: 24.6 % (ref 20–50)
HGB BLD-MCNC: 15.4 G/DL (ref 14–18)
IMM GRANULOCYTES # BLD AUTO: 0.13 K/UL (ref 0–0.04)
IMM GRANULOCYTES NFR BLD AUTO: 1.4 % (ref 0–0.5)
LDLC SERPL CALC-MCNC: 93.2 MG/DL (ref 63–159)
LYMPHOCYTES # BLD AUTO: 2.2 K/UL (ref 1–4.8)
LYMPHOCYTES NFR BLD: 24 % (ref 18–48)
MCH RBC QN AUTO: 30 PG (ref 27–31)
MCHC RBC AUTO-ENTMCNC: 34.2 G/DL (ref 32–36)
MCV RBC AUTO: 88 FL (ref 82–98)
MONOCYTES # BLD AUTO: 0.7 K/UL (ref 0.3–1)
MONOCYTES NFR BLD: 7.6 % (ref 4–15)
NEUTROPHILS # BLD AUTO: 5.8 K/UL (ref 1.8–7.7)
NEUTROPHILS NFR BLD: 62.6 % (ref 38–73)
NONHDLC SERPL-MCNC: 132 MG/DL
NRBC BLD-RTO: 0 /100 WBC
PLATELET # BLD AUTO: 245 K/UL (ref 150–450)
PMV BLD AUTO: 9.6 FL (ref 9.2–12.9)
POTASSIUM SERPL-SCNC: 4.1 MMOL/L (ref 3.5–5.1)
PROT SERPL-MCNC: 7.6 G/DL (ref 6–8.4)
RBC # BLD AUTO: 5.13 M/UL (ref 4.6–6.2)
SODIUM SERPL-SCNC: 138 MMOL/L (ref 136–145)
TRIGL SERPL-MCNC: 194 MG/DL (ref 30–150)
TSH SERPL DL<=0.005 MIU/L-ACNC: 1.99 UIU/ML (ref 0.4–4)
WBC # BLD AUTO: 9.33 K/UL (ref 3.9–12.7)

## 2022-01-26 PROCEDURE — 36415 COLL VENOUS BLD VENIPUNCTURE: CPT | Performed by: INTERNAL MEDICINE

## 2022-01-26 PROCEDURE — 84443 ASSAY THYROID STIM HORMONE: CPT | Performed by: INTERNAL MEDICINE

## 2022-01-26 PROCEDURE — 1160F RVW MEDS BY RX/DR IN RCRD: CPT | Mod: CPTII,S$GLB,, | Performed by: PODIATRIST

## 2022-01-26 PROCEDURE — 99999 PR PBB SHADOW E&M-EST. PATIENT-LVL III: CPT | Mod: PBBFAC,,, | Performed by: PODIATRIST

## 2022-01-26 PROCEDURE — 99999 PR PBB SHADOW E&M-EST. PATIENT-LVL III: ICD-10-PCS | Mod: PBBFAC,,, | Performed by: PODIATRIST

## 2022-01-26 PROCEDURE — 3077F PR MOST RECENT SYSTOLIC BLOOD PRESSURE >= 140 MM HG: ICD-10-PCS | Mod: CPTII,S$GLB,, | Performed by: PODIATRIST

## 2022-01-26 PROCEDURE — 83036 HEMOGLOBIN GLYCOSYLATED A1C: CPT | Performed by: INTERNAL MEDICINE

## 2022-01-26 PROCEDURE — 3078F DIAST BP <80 MM HG: CPT | Mod: CPTII,S$GLB,, | Performed by: PODIATRIST

## 2022-01-26 PROCEDURE — 1160F PR REVIEW ALL MEDS BY PRESCRIBER/CLIN PHARMACIST DOCUMENTED: ICD-10-PCS | Mod: CPTII,S$GLB,, | Performed by: PODIATRIST

## 2022-01-26 PROCEDURE — 99499 NO LOS: ICD-10-PCS | Mod: S$GLB,,, | Performed by: PODIATRIST

## 2022-01-26 PROCEDURE — 3077F SYST BP >= 140 MM HG: CPT | Mod: CPTII,S$GLB,, | Performed by: PODIATRIST

## 2022-01-26 PROCEDURE — 4010F PR ACE/ARB THEARPY RXD/TAKEN: ICD-10-PCS | Mod: CPTII,S$GLB,, | Performed by: PODIATRIST

## 2022-01-26 PROCEDURE — 3046F PR MOST RECENT HEMOGLOBIN A1C LEVEL > 9.0%: ICD-10-PCS | Mod: CPTII,S$GLB,, | Performed by: PODIATRIST

## 2022-01-26 PROCEDURE — 11042 WOUND DEBRIDEMENT: ICD-10-PCS | Mod: S$GLB,,, | Performed by: PODIATRIST

## 2022-01-26 PROCEDURE — 4010F ACE/ARB THERAPY RXD/TAKEN: CPT | Mod: CPTII,S$GLB,, | Performed by: PODIATRIST

## 2022-01-26 PROCEDURE — 80053 COMPREHEN METABOLIC PANEL: CPT | Performed by: INTERNAL MEDICINE

## 2022-01-26 PROCEDURE — 1159F PR MEDICATION LIST DOCUMENTED IN MEDICAL RECORD: ICD-10-PCS | Mod: CPTII,S$GLB,, | Performed by: PODIATRIST

## 2022-01-26 PROCEDURE — 80061 LIPID PANEL: CPT | Performed by: INTERNAL MEDICINE

## 2022-01-26 PROCEDURE — 3008F PR BODY MASS INDEX (BMI) DOCUMENTED: ICD-10-PCS | Mod: CPTII,S$GLB,, | Performed by: PODIATRIST

## 2022-01-26 PROCEDURE — 3008F BODY MASS INDEX DOCD: CPT | Mod: CPTII,S$GLB,, | Performed by: PODIATRIST

## 2022-01-26 PROCEDURE — 84153 ASSAY OF PSA TOTAL: CPT | Performed by: INTERNAL MEDICINE

## 2022-01-26 PROCEDURE — 85025 COMPLETE CBC W/AUTO DIFF WBC: CPT | Performed by: INTERNAL MEDICINE

## 2022-01-26 PROCEDURE — 3078F PR MOST RECENT DIASTOLIC BLOOD PRESSURE < 80 MM HG: ICD-10-PCS | Mod: CPTII,S$GLB,, | Performed by: PODIATRIST

## 2022-01-26 PROCEDURE — 1159F MED LIST DOCD IN RCRD: CPT | Mod: CPTII,S$GLB,, | Performed by: PODIATRIST

## 2022-01-26 PROCEDURE — 11042 DBRDMT SUBQ TIS 1ST 20SQCM/<: CPT | Mod: S$GLB,,, | Performed by: PODIATRIST

## 2022-01-26 PROCEDURE — 3046F HEMOGLOBIN A1C LEVEL >9.0%: CPT | Mod: CPTII,S$GLB,, | Performed by: PODIATRIST

## 2022-01-26 PROCEDURE — 99499 UNLISTED E&M SERVICE: CPT | Mod: S$GLB,,, | Performed by: PODIATRIST

## 2022-01-26 NOTE — PROCEDURES
"Wound Debridement    Date/Time: 1/26/2022 9:15 AM  Performed by: Emelia Brock DPM  Authorized by: Emelia Brock DPM     Time out: Immediately prior to procedure a "time out" was called to verify the correct patient, procedure, equipment, support staff and site/side marked as required.    Consent Done?:  Yes (Verbal)    Preparation: Patient was prepped and draped in usual sterile fashion    Local anesthesia used?: No      Wound Details:    Location:  Right foot    Location:  Right 3rd Metatarsal Head    Type of Debridement:  Excisional       Length (cm):  1       Area (sq cm):  0.8       Width (cm):  0.8       Percent Debrided (%):  100       Depth (cm):  0.3       Total Area Debrided (sq cm):  0.8    Depth of debridement:  Subcutaneous tissue    Tissue debrided:  Dermis, Epidermis and Subcutaneous    Devitalized tissue debrided:  Biofilm, Callus and Fibrin    Instruments:  Curette    Additional wounds:  1    2nd Wound Details:     Location:  Left foot    Location:  Left 1st Metatarsal Head    Location:  Left 1st Metatarsal Head    Type of Debridement:  Excisional       Length (cm):  2.5       Area (sq cm):  0.5       Width (cm):  0.2       Percent Debrided (%):  0       Depth (cm):  0.1       Total Area Debrided (sq cm):  0    Depth of debridement:  Subcutaneous tissue    Tissue debrided:  Dermis, Epidermis and Subcutaneous    Devitalized tissue debrided:  Biofilm, Callus and Fibrin    Instruments:  Curette    Bleeding:  Moderate  Hemostasis Achieved: Yes    Method Used:  Pressure  Patient tolerance:  Patient tolerated the procedure well with no immediate complications      "

## 2022-01-26 NOTE — PROGRESS NOTES
Subjective:      Patient ID: Billy Rivera is a 56 y.o. male.    Chief Complaint: Wound Check (Pcp-Brown 4/30/2021)    Chronic wound left and right foot.  Changing dressing daily at home every other day.  Ambulating in surgical shoe left  casual shoe right. No new complaints today.  He has been alternating Iodosorb and Medihoney.  He continues to be on his feet quite often due to work as a . Sits at desk 50% of the time. Normally sees Dr. Melara but seeing me this week. Keeps wounds covered with gauze, kerlix and coban. No pain.       Hemoglobin A1C   Date Value Ref Range Status   01/26/2022 12.2 (H) 4.0 - 5.6 % Final     Comment:     ADA Screening Guidelines:  5.7-6.4%  Consistent with prediabetes  >or=6.5%  Consistent with diabetes    High levels of fetal hemoglobin interfere with the HbA1C  assay. Heterozygous hemoglobin variants (HbS, HgC, etc)do  not significantly interfere with this assay.   However, presence of multiple variants may affect accuracy.     12/22/2020 10.5 (H) 4.0 - 5.6 % Final     Comment:     ADA Screening Guidelines:  5.7-6.4%  Consistent with prediabetes  >or=6.5%  Consistent with diabetes  High levels of fetal hemoglobin interfere with the HbA1C  assay. Heterozygous hemoglobin variants (HbS, HgC, etc)do  not significantly interfere with this assay.   However, presence of multiple variants may affect accuracy.     10/13/2020 10.8 (H) 4.0 - 5.6 % Final     Comment:     ADA Screening Guidelines:  5.7-6.4%  Consistent with prediabetes  >or=6.5%  Consistent with diabetes  High levels of fetal hemoglobin interfere with the HbA1C  assay. Heterozygous hemoglobin variants (HbS, HgC, etc)do  not significantly interfere with this assay.   However, presence of multiple variants may affect accuracy.             Patient Active Problem List   Diagnosis    Coronary artery disease involving native coronary artery of native heart without angina pectoris    BDR (background diabetic  retinopathy) - Both Eyes    Uncontrolled type II diabetes mellitus    PVD (peripheral vascular disease)    Sleep apnea    Proteinuria    Edema    Hypertension associated with diabetes    Type 2 diabetes mellitus with diabetic peripheral angiopathy without gangrene, with long-term current use of insulin    Onychomycosis of multiple toenails with type 2 diabetes mellitus    Type 2 diabetes mellitus 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, with long-term current use of insulin    Hyponatremia    Anemia    Hypomagnesemia    Diabetic foot ulcer associated with type 2 diabetes mellitus    Insomnia    Hx of colon cancer, stage I    Dyslipidemia associated with type 2 diabetes mellitus    Diabetes mellitus with insulin therapy    Obesity, diabetes, and hypertension syndrome    Class 2 severe obesity with body mass index (BMI) of 35 to 39.9 with serious comorbidity    Diabetes mellitus with microalbuminuria    Diabetes mellitus with peripheral circulatory disorder    Diabetic foot infection    Coronary artery disease involving coronary bypass graft of native heart without angina pectoris    Osteomyelitis of left foot       Current Outpatient Medications on File Prior to Visit   Medication Sig Dispense Refill    acetaminophen (TYLENOL) 325 MG tablet Take 325 mg by mouth every 6 (six) hours as needed for Pain.      aspirin (ECOTRIN) 81 MG EC tablet Take 1 tablet (81 mg total) by mouth once daily.  0    aspirin/salicylamide/caffeine (BC HEADACHE POWDER ORAL) Take 1 Package by mouth daily as needed (pain).      atorvastatin (LIPITOR) 80 MG tablet Take 1 tablet by mouth once daily 30 tablet 0    blood sugar diagnostic (ACCU-CHEK GUIDE TEST STRIPS) Strp 1 each by Misc.(Non-Drug; Combo Route) route 5 (five) times daily. 150 each 11    blood-glucose sensor (GUARDIAN SENSOR 3) Haley 1 each by Misc.(Non-Drug; Combo Route) route every 7 days. To use with  "Guardian Transmitter, change every 7 days - using in conjunction with Medtronic 770 insulin pump - in management of T2dm - #E11.65 5 each 12    blood-glucose transmitter (GUARDIAN LINK 3 TRANSMITTER) Haley 1 each by Misc.(Non-Drug; Combo Route) route continuous. Use transmitter daily - with Medtronic 770 insulin pump - medically necessary in management of T2DM #E11.65 1 Device 0    carvediloL (COREG) 12.5 MG tablet Take 1 tablet (12.5 mg total) by mouth 2 (two) times daily. 60 tablet 11    clotrimazole-betamethasone 1-0.05% (LOTRISONE) cream Apply topically 2 (two) times daily. 45 g 2    doxycycline (VIBRA-TABS) 100 MG tablet Take 1 tablet (100 mg total) by mouth 2 (two) times daily. 20 tablet 0    empagliflozin (JARDIANCE) 25 mg tablet Take 1 tablet (25 mg total) by mouth once daily. 30 tablet 3    ibuprofen (ADVIL,MOTRIN) 200 MG tablet Take 200 mg by mouth every 6 (six) hours as needed for Pain.      insulin aspart U-100 (NOVOLOG U-100 INSULIN ASPART) 100 unit/mL injection Inject 70 Units into the skin continuous. 40 mL 3    insulin lispro 100 unit/mL injection Inject 70 Units into the skin continuous. 40 mL 3    insulin regular 100 unit/mL Inj injection Inject 25 Units into the skin 3 (three) times daily before meals. 10 mL 11    lancets (ACCU-CHEK FASTCLIX LANCET DRUM) Misc 1 each by Misc.(Non-Drug; Combo Route) route Daily. 30 each 11    lisinopriL (PRINIVIL,ZESTRIL) 40 MG tablet Take 1 tablet by mouth once daily 30 tablet 0    melatonin (MELATIN) 3 mg tablet Take 2 tablets (6 mg total) by mouth nightly as needed for Insomnia.  0    metFORMIN (GLUCOPHAGE) 1000 MG tablet Take 1 tablet (1,000 mg total) by mouth 2 (two) times daily with meals. 60 tablet 11    MINIMED 770G INSULIN PUMP Misc 1 each by Misc.(Non-Drug; Combo Route) route continuous. Medically necessary for management of Type 2 diabetes, E11.65 1 each 0    pen needle, diabetic 31 gauge x 3/16" Ndle Inject 1 each into the skin 3 (three) " times daily before meals. (Patient taking differently: Inject 1 each into the skin 4 (four) times daily.) 100 each 12     No current facility-administered medications on file prior to visit.       Review of patient's allergies indicates:   Allergen Reactions    Penicillins Other (See Comments)     PCN allergy as a child - was told he went into a coma. Tolerates Cefazolin without adverse reactions    Penicillin     Shellfish containing products      Other reaction(s): Unknown    Vancomycin Itching     Tolerated vancomycin 7/2020    Bactrim  [sulfamethoxazole-trimethoprim] Rash       Past Surgical History:   Procedure Laterality Date    COLONOSCOPY N/A 2/17/2017    Procedure: COLONOSCOPY;  Surgeon: Simon Gomez MD;  Location: [facility] (4TH FLR);  Service: Endoscopy;  Laterality: N/A;    CORONARY ANGIOPLASTY      INCISION AND DRAINAGE FOOT Right 6/5/2018    Procedure: INCISION AND DRAINAGE, FOOT;  Surgeon: Bridget Santana DPM;  [facility];  Service: Podiatry;  Laterality: Right;  Request mini C arm in room. request wound vac with medium black sponge    INCISION AND DRAINAGE FOOT Right 6/7/2018    Procedure: INCISION AND DRAINAGE, FOOT;  Surgeon: Emelia Brock DPM;  Location: [facility] OR 2ND FLR;  Service: Podiatry;  Laterality: Right;    INCISION AND DRAINAGE FOOT Left 9/4/2020    Procedure: INCISION AND DRAINAGE, FOOT;  Surgeon: Ainsley Powell DPM;  Location: [facility] OR 2ND FLR;  Service: Podiatry;  Laterality: Left;    INCISION AND DRAINAGE FOOT Left 12/22/2020    Procedure: INCISION AND DRAINAGE, FOOT;  Surgeon: Ainsley Powell DPM;  [facility];  Service: Podiatry;  Laterality: Left;  May need micro choice  Need Jam shidi needles  Stretcher OK      INCISION AND DRAINAGE OF ABSCESS Right 6/2/2018    Procedure: INCISION AND DRAINAGE, ABSCESS;  Surgeon: Bridget Santana DPM;  Location: [facility] OR [address] [place] [address];  Service: General;  Laterality: Right;    OSTEOTOMY OF METATARSAL BONE   9/4/2020    Procedure: OSTEOTOMY, METATARSAL BONE, 1st METATARSAL RESECTION;  Surgeon: Ainsley Powell DPM;  Location: [facility] OR 2ND FLR;  Service: Podiatry;;    REMOVAL OF FOREIGN BODY FROM FOOT Right 6/7/2018    Procedure: REMOVAL, FOREIGN BODY, FOOT;  Surgeon: Emelia Brock DPM;  Location: [facility] OR 2ND FLR;  Service: Podiatry;  Laterality: Right;    TOE AMPUTATION Left 7/4/2020    Procedure: AMPUTATION, TOE;  Surgeon: Bridget Santana DPM;  Location: [facility] OR 2ND FLR;  Service: Podiatry;  Laterality: Left;    TOE AMPUTATION Left 10/14/2020    Procedure: AMPUTATION, TOE;  Surgeon: Mingo Melara DPM;  Location: [facility] OR [facility] FLR;  Service: Podiatry;  Laterality: Left;  stretcher OK    TONSILLECTOMY         Family History   Problem Relation Age of Onset    Heart disease Mother     Diabetes Mother     Diabetes Father     Heart disease Brother     Diabetes Maternal Grandmother     Diabetes Maternal Grandfather     Diabetes Paternal Grandmother     Diabetes Paternal Grandfather     Anesthesia problems Neg Hx        Social History     Socioeconomic History    Marital status:    Tobacco Use    Smoking status: Never Smoker    Smokeless tobacco: Never Used   Substance and Sexual Activity    Alcohol use: Yes     Comment: rare x1 beer-     Drug use: No    Sexual activity: Not Currently           Review of Systems   Constitutional: Negative for chills, fever, malaise/fatigue and night sweats.   Cardiovascular: Negative for claudication, cyanosis and leg swelling.   Skin: Positive for poor wound healing. Negative for color change, itching, rash and suspicious lesions.   Musculoskeletal: Negative for falls, gout, joint pain and myalgias.   Neurological: Positive for numbness and sensory change.           Objective:       Vitals:    01/26/22 0912   BP: (!) 156/79   Pulse: (!) 54   Weight: 118.4 kg (261 lb 0.4 oz)   Height: 6' (1.829 m)   PainSc: 0-No pain       Physical Exam  Constitutional:        General: He is not in acute distress.     Appearance: He is well-developed. He is not diaphoretic.   Cardiovascular:      Pulses:           Dorsalis pedis pulses are 1+ on the right side and 1+ on the left side.        Posterior tibial pulses are 1+ on the right side and 1+ on the left side.      Comments: Toes warm, pink, cap fill <5 seconds.  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     Skin:     General: Skin is warm and dry.      Coloration: Skin is not pale.      Findings: see wound description below    Neurological:      Comments: Diminished/loss of protective sensation all toes bilateral to 10 gram monofilament.     Psychiatric:         Behavior: Behavior is cooperative.         Ulcer location: plantar partial 1st ray amputation stump, left  Measurements : 2.5 x 0.2x0.1cm ,    Signs of infection:none  Drainage: Sero-Sanguinous  Purulence: no  Crepitus/fluctuance: no  Periwound: Macerated, Calloused  Base: Mixed Granular/Fibrotic  Depth: subcutaneous tissue  Probe to bone: no    Ulcer location: plantar 3rd met head R   Measurements : 1.0 x 0.8 x 0.3cm     Signs of infection: none  Drainage: Sero-Sanguinous  Purulence: no  Crepitus/fluctuance: no  Periwound:  Calloused, macerated  Base: Mixed Granular/Fibrotic  Depth: subcutaneous tissue  Probe to bone: no           Assessment:       Encounter Diagnoses   Name Primary?    Type II diabetes mellitus with neurological manifestations Yes    Foot ulcer, left, with fat layer exposed     Foot ulcer, right, with fat layer exposed          Plan:       Billy was seen today for wound check.    Diagnoses and all orders for this visit:    Type II diabetes mellitus with neurological manifestations    Foot ulcer, left, with fat layer exposed    Foot ulcer, right, with fat layer exposed      L foot wound  slightly larger today and R foot wound deeper than usual. No SOI. Stable otherwise.     Education about the prevention of limb loss.    Discussed wound healing cycle, skin integrity, ways to care for skin.Counseled patient on the effects of high blood glucose on healing. He verbalizes understanding that it can increase the chances of delayed healing and this prolonged exposure leads to infection or progression of infection which subsequently can result in loss of limb.    Adequate vitamin supplementation, protein intake, and hydration - discussed with patient     Excisional debridement preformed of wound today. See separate procedure note    Leticia to both wounds, covered with wound foam, well padded football dressing. Darco shoe bilateral. Advised to keep clean, dry and intact for 3 days after which he can restart his routine dressing changes every 2 days.     RTC 2 weeks, sooner PRN

## 2022-02-08 ENCOUNTER — PATIENT OUTREACH (OUTPATIENT)
Dept: ADMINISTRATIVE | Facility: OTHER | Age: 57
End: 2022-02-08
Payer: COMMERCIAL

## 2022-02-09 ENCOUNTER — OFFICE VISIT (OUTPATIENT)
Dept: INTERNAL MEDICINE | Facility: CLINIC | Age: 57
End: 2022-02-09
Payer: COMMERCIAL

## 2022-02-09 ENCOUNTER — OFFICE VISIT (OUTPATIENT)
Dept: PODIATRY | Facility: CLINIC | Age: 57
End: 2022-02-09
Payer: COMMERCIAL

## 2022-02-09 VITALS
DIASTOLIC BLOOD PRESSURE: 80 MMHG | SYSTOLIC BLOOD PRESSURE: 130 MMHG | TEMPERATURE: 97 F | OXYGEN SATURATION: 98 % | WEIGHT: 275.38 LBS | RESPIRATION RATE: 14 BRPM | HEART RATE: 97 BPM | HEIGHT: 74 IN | BODY MASS INDEX: 35.34 KG/M2

## 2022-02-09 VITALS
WEIGHT: 261 LBS | BODY MASS INDEX: 35.4 KG/M2 | DIASTOLIC BLOOD PRESSURE: 79 MMHG | HEART RATE: 67 BPM | SYSTOLIC BLOOD PRESSURE: 131 MMHG

## 2022-02-09 DIAGNOSIS — L97.522 FOOT ULCER, LEFT, WITH FAT LAYER EXPOSED: ICD-10-CM

## 2022-02-09 DIAGNOSIS — E11.59 HYPERTENSION ASSOCIATED WITH DIABETES: ICD-10-CM

## 2022-02-09 DIAGNOSIS — S98.132A AMPUTATION OF TOE OF LEFT FOOT: ICD-10-CM

## 2022-02-09 DIAGNOSIS — E11.69 DYSLIPIDEMIA ASSOCIATED WITH TYPE 2 DIABETES MELLITUS: ICD-10-CM

## 2022-02-09 DIAGNOSIS — E11.51 TYPE 2 DIABETES MELLITUS WITH DIABETIC PERIPHERAL ANGIOPATHY WITHOUT GANGRENE, WITH LONG-TERM CURRENT USE OF INSULIN: ICD-10-CM

## 2022-02-09 DIAGNOSIS — I25.810 CORONARY ARTERY DISEASE INVOLVING CORONARY BYPASS GRAFT OF NATIVE HEART WITHOUT ANGINA PECTORIS: ICD-10-CM

## 2022-02-09 DIAGNOSIS — Z79.4 TYPE 2 DIABETES MELLITUS WITH BOTH EYES AFFECTED BY MODERATE NONPROLIFERATIVE RETINOPATHY WITHOUT MACULAR EDEMA, WITH LONG-TERM CURRENT USE OF INSULIN: Primary | ICD-10-CM

## 2022-02-09 DIAGNOSIS — E11.49 TYPE II DIABETES MELLITUS WITH NEUROLOGICAL MANIFESTATIONS: Primary | ICD-10-CM

## 2022-02-09 DIAGNOSIS — E66.01 CLASS 2 SEVERE OBESITY WITH BODY MASS INDEX (BMI) OF 35 TO 39.9 WITH SERIOUS COMORBIDITY: ICD-10-CM

## 2022-02-09 DIAGNOSIS — L97.512 FOOT ULCER, RIGHT, WITH FAT LAYER EXPOSED: ICD-10-CM

## 2022-02-09 DIAGNOSIS — E78.5 DYSLIPIDEMIA ASSOCIATED WITH TYPE 2 DIABETES MELLITUS: ICD-10-CM

## 2022-02-09 DIAGNOSIS — E11.3393 TYPE 2 DIABETES MELLITUS WITH BOTH EYES AFFECTED BY MODERATE NONPROLIFERATIVE RETINOPATHY WITHOUT MACULAR EDEMA, WITH LONG-TERM CURRENT USE OF INSULIN: Primary | ICD-10-CM

## 2022-02-09 DIAGNOSIS — I15.2 HYPERTENSION ASSOCIATED WITH DIABETES: ICD-10-CM

## 2022-02-09 DIAGNOSIS — Z79.4 TYPE 2 DIABETES MELLITUS WITH DIABETIC PERIPHERAL ANGIOPATHY WITHOUT GANGRENE, WITH LONG-TERM CURRENT USE OF INSULIN: ICD-10-CM

## 2022-02-09 LAB — GLUCOSE SERPL-MCNC: 269 MG/DL (ref 70–110)

## 2022-02-09 PROCEDURE — 11042 PR DEBRIDEMENT, SKIN, SUB-Q TISSUE,=<20 SQ CM: ICD-10-PCS | Mod: S$GLB,,, | Performed by: PODIATRIST

## 2022-02-09 PROCEDURE — 3075F PR MOST RECENT SYSTOLIC BLOOD PRESS GE 130-139MM HG: ICD-10-PCS | Mod: CPTII,S$GLB,, | Performed by: NURSE PRACTITIONER

## 2022-02-09 PROCEDURE — 99213 PR OFFICE/OUTPT VISIT, EST, LEVL III, 20-29 MIN: ICD-10-PCS | Mod: 25,S$GLB,, | Performed by: PODIATRIST

## 2022-02-09 PROCEDURE — 1159F PR MEDICATION LIST DOCUMENTED IN MEDICAL RECORD: ICD-10-PCS | Mod: CPTII,S$GLB,, | Performed by: NURSE PRACTITIONER

## 2022-02-09 PROCEDURE — 99999 PR PBB SHADOW E&M-EST. PATIENT-LVL V: ICD-10-PCS | Mod: PBBFAC,,, | Performed by: NURSE PRACTITIONER

## 2022-02-09 PROCEDURE — 3066F NEPHROPATHY DOC TX: CPT | Mod: CPTII,S$GLB,, | Performed by: PODIATRIST

## 2022-02-09 PROCEDURE — 3008F BODY MASS INDEX DOCD: CPT | Mod: CPTII,S$GLB,, | Performed by: PODIATRIST

## 2022-02-09 PROCEDURE — 1159F MED LIST DOCD IN RCRD: CPT | Mod: CPTII,S$GLB,, | Performed by: NURSE PRACTITIONER

## 2022-02-09 PROCEDURE — 99999 PR PBB SHADOW E&M-EST. PATIENT-LVL III: ICD-10-PCS | Mod: PBBFAC,,, | Performed by: PODIATRIST

## 2022-02-09 PROCEDURE — 3046F PR MOST RECENT HEMOGLOBIN A1C LEVEL > 9.0%: ICD-10-PCS | Mod: CPTII,S$GLB,, | Performed by: NURSE PRACTITIONER

## 2022-02-09 PROCEDURE — 99215 OFFICE O/P EST HI 40 MIN: CPT | Mod: S$GLB,,, | Performed by: NURSE PRACTITIONER

## 2022-02-09 PROCEDURE — 82962 POCT GLUCOSE, HAND-HELD DEVICE: ICD-10-PCS | Mod: S$GLB,,, | Performed by: NURSE PRACTITIONER

## 2022-02-09 PROCEDURE — 4010F ACE/ARB THERAPY RXD/TAKEN: CPT | Mod: CPTII,S$GLB,, | Performed by: NURSE PRACTITIONER

## 2022-02-09 PROCEDURE — 3066F PR DOCUMENTATION OF TREATMENT FOR NEPHROPATHY: ICD-10-PCS | Mod: CPTII,S$GLB,, | Performed by: PODIATRIST

## 2022-02-09 PROCEDURE — 99999 PR PBB SHADOW E&M-EST. PATIENT-LVL V: CPT | Mod: PBBFAC,,, | Performed by: NURSE PRACTITIONER

## 2022-02-09 PROCEDURE — 3075F SYST BP GE 130 - 139MM HG: CPT | Mod: CPTII,S$GLB,, | Performed by: PODIATRIST

## 2022-02-09 PROCEDURE — 3062F PR POS MACROALBUMINURIA RESULT DOCUMENTED/REVIEW: ICD-10-PCS | Mod: CPTII,S$GLB,, | Performed by: PODIATRIST

## 2022-02-09 PROCEDURE — 3062F PR POS MACROALBUMINURIA RESULT DOCUMENTED/REVIEW: ICD-10-PCS | Mod: CPTII,S$GLB,, | Performed by: NURSE PRACTITIONER

## 2022-02-09 PROCEDURE — 3008F PR BODY MASS INDEX (BMI) DOCUMENTED: ICD-10-PCS | Mod: CPTII,S$GLB,, | Performed by: PODIATRIST

## 2022-02-09 PROCEDURE — 99999 PR PBB SHADOW E&M-EST. PATIENT-LVL III: CPT | Mod: PBBFAC,,, | Performed by: PODIATRIST

## 2022-02-09 PROCEDURE — 3075F SYST BP GE 130 - 139MM HG: CPT | Mod: CPTII,S$GLB,, | Performed by: NURSE PRACTITIONER

## 2022-02-09 PROCEDURE — 1160F RVW MEDS BY RX/DR IN RCRD: CPT | Mod: CPTII,S$GLB,, | Performed by: NURSE PRACTITIONER

## 2022-02-09 PROCEDURE — 3008F BODY MASS INDEX DOCD: CPT | Mod: CPTII,S$GLB,, | Performed by: NURSE PRACTITIONER

## 2022-02-09 PROCEDURE — 4010F ACE/ARB THERAPY RXD/TAKEN: CPT | Mod: CPTII,S$GLB,, | Performed by: PODIATRIST

## 2022-02-09 PROCEDURE — 3078F PR MOST RECENT DIASTOLIC BLOOD PRESSURE < 80 MM HG: ICD-10-PCS | Mod: CPTII,S$GLB,, | Performed by: PODIATRIST

## 2022-02-09 PROCEDURE — 99215 PR OFFICE/OUTPT VISIT, EST, LEVL V, 40-54 MIN: ICD-10-PCS | Mod: S$GLB,,, | Performed by: NURSE PRACTITIONER

## 2022-02-09 PROCEDURE — 3008F PR BODY MASS INDEX (BMI) DOCUMENTED: ICD-10-PCS | Mod: CPTII,S$GLB,, | Performed by: NURSE PRACTITIONER

## 2022-02-09 PROCEDURE — 4010F PR ACE/ARB THEARPY RXD/TAKEN: ICD-10-PCS | Mod: CPTII,S$GLB,, | Performed by: NURSE PRACTITIONER

## 2022-02-09 PROCEDURE — 3066F NEPHROPATHY DOC TX: CPT | Mod: CPTII,S$GLB,, | Performed by: NURSE PRACTITIONER

## 2022-02-09 PROCEDURE — 3079F PR MOST RECENT DIASTOLIC BLOOD PRESSURE 80-89 MM HG: ICD-10-PCS | Mod: CPTII,S$GLB,, | Performed by: NURSE PRACTITIONER

## 2022-02-09 PROCEDURE — 82962 GLUCOSE BLOOD TEST: CPT | Mod: S$GLB,,, | Performed by: NURSE PRACTITIONER

## 2022-02-09 PROCEDURE — 3062F POS MACROALBUMINURIA REV: CPT | Mod: CPTII,S$GLB,, | Performed by: NURSE PRACTITIONER

## 2022-02-09 PROCEDURE — 3062F POS MACROALBUMINURIA REV: CPT | Mod: CPTII,S$GLB,, | Performed by: PODIATRIST

## 2022-02-09 PROCEDURE — 99213 OFFICE O/P EST LOW 20 MIN: CPT | Mod: 25,S$GLB,, | Performed by: PODIATRIST

## 2022-02-09 PROCEDURE — 1160F PR REVIEW ALL MEDS BY PRESCRIBER/CLIN PHARMACIST DOCUMENTED: ICD-10-PCS | Mod: CPTII,S$GLB,, | Performed by: NURSE PRACTITIONER

## 2022-02-09 PROCEDURE — 3075F PR MOST RECENT SYSTOLIC BLOOD PRESS GE 130-139MM HG: ICD-10-PCS | Mod: CPTII,S$GLB,, | Performed by: PODIATRIST

## 2022-02-09 PROCEDURE — 4010F PR ACE/ARB THEARPY RXD/TAKEN: ICD-10-PCS | Mod: CPTII,S$GLB,, | Performed by: PODIATRIST

## 2022-02-09 PROCEDURE — 3066F PR DOCUMENTATION OF TREATMENT FOR NEPHROPATHY: ICD-10-PCS | Mod: CPTII,S$GLB,, | Performed by: NURSE PRACTITIONER

## 2022-02-09 PROCEDURE — 3079F DIAST BP 80-89 MM HG: CPT | Mod: CPTII,S$GLB,, | Performed by: NURSE PRACTITIONER

## 2022-02-09 PROCEDURE — 3078F DIAST BP <80 MM HG: CPT | Mod: CPTII,S$GLB,, | Performed by: PODIATRIST

## 2022-02-09 PROCEDURE — 3046F PR MOST RECENT HEMOGLOBIN A1C LEVEL > 9.0%: ICD-10-PCS | Mod: CPTII,S$GLB,, | Performed by: PODIATRIST

## 2022-02-09 PROCEDURE — 3046F HEMOGLOBIN A1C LEVEL >9.0%: CPT | Mod: CPTII,S$GLB,, | Performed by: NURSE PRACTITIONER

## 2022-02-09 PROCEDURE — 3046F HEMOGLOBIN A1C LEVEL >9.0%: CPT | Mod: CPTII,S$GLB,, | Performed by: PODIATRIST

## 2022-02-09 PROCEDURE — 11042 DBRDMT SUBQ TIS 1ST 20SQCM/<: CPT | Mod: S$GLB,,, | Performed by: PODIATRIST

## 2022-02-09 RX ORDER — INSULIN GLARGINE 100 [IU]/ML
60 INJECTION, SOLUTION SUBCUTANEOUS DAILY
Qty: 15 ML | Refills: 0 | Status: SHIPPED | OUTPATIENT
Start: 2022-02-09 | End: 2022-08-09

## 2022-02-09 RX ORDER — DAPAGLIFLOZIN 5 MG/1
5 TABLET, FILM COATED ORAL DAILY
Qty: 30 TABLET | Refills: 6 | Status: SHIPPED | OUTPATIENT
Start: 2022-02-09 | End: 2022-02-23

## 2022-02-09 RX ORDER — BLOOD-GLUCOSE SENSOR
1 EACH MISCELLANEOUS
Qty: 3 EACH | Refills: 11 | Status: SHIPPED | OUTPATIENT
Start: 2022-02-09 | End: 2022-02-23

## 2022-02-09 RX ORDER — INSULIN LISPRO 100 [IU]/ML
100 INJECTION, SOLUTION INTRAVENOUS; SUBCUTANEOUS CONTINUOUS
Qty: 50 ML | Refills: 11 | Status: SHIPPED | OUTPATIENT
Start: 2022-02-09 | End: 2022-02-09 | Stop reason: SDUPTHER

## 2022-02-09 RX ORDER — BLOOD-GLUCOSE TRANSMITTER
1 EACH MISCELLANEOUS DAILY
Qty: 1 EACH | Refills: 3 | Status: SHIPPED | OUTPATIENT
Start: 2022-02-09 | End: 2022-02-23

## 2022-02-09 RX ORDER — INSULIN LISPRO 100 [IU]/ML
100 INJECTION, SOLUTION INTRAVENOUS; SUBCUTANEOUS CONTINUOUS
Qty: 50 ML | Refills: 11 | Status: SHIPPED | OUTPATIENT
Start: 2022-02-09 | End: 2022-02-23 | Stop reason: SDUPTHER

## 2022-02-09 NOTE — PATIENT INSTRUCTIONS
Change to using humalog in your insulin pump.   Please bolus insulin with your meals.   Give the bolus right before you eat - 10 units.   5 units with a snack.     Continue metformin 1000 mg twice per day.   Start farxiga 5mg tablet daily - take in the morning -     Keep hydrated -   Eat diabetic diet.   No more fast food.   Fried chicken, french fries, breads - pastas.     Mediterranean diet is best - lean meats, fish, vegetables, nuts, some cheese are ok.   Berries for fruit.     Normal fasting sugar should range 80 - 120's.   150 - 180's after a meal.     For low blood sugar - remember to keep glucose tablets on hand. You can purchase these at the pharmacy check out desk/over the counter.   If blood sugar is less than 70, take/eat 2 - 3 tablets quickly to bring your sugar up.   Always keep 15 grams of Quick acting carbohydrates on hand to eat/drink if your sugar is low - examples are 1/2 cup of juice, coke, or crackers, granola bars.   Remember to eat meals frequently to prevent low blood blood sugars.     Please remember to change insulin pump set/pump site, refill reservoir every 3 days according to  instructions. Longer duration between pump site changes can result in high blood glucose.     Please bolus insulin 15 minutes before you eat meals to avoid spikes and lows in blood glucose.     Please bolus with every snack outside of daily meals.     If you are having sensor issues, please check glucose by finger sticks and input the glucose levels into the pump.     Contact the supplier if you are having sensors or pump issues for troubleshooting and additional supplies.              Pump Back Up Plan:   1.  In case you are not certain the pump or tubing is working correctly, use this plan.  2.  Stop the pump and disconnect the tubing and insertion set.  3.  If you will be off the pump for more than 1-2 hours without a basal rate, you must correct with Novolog or switch to a long acting insulin plan.   "  4.  If the insulin pump is non functional and discontinued for anticipated more than 20 hours, please give daily injections of:  Long acting insulin Lantus 60 units once daily as prescribed   Short acting insulin for meals according to carb ratios 1:6 or you can try 10 - 15 units of humalog or novolin R  and correction factor   5.  When the insulin pump is restarted, do not restart basal rates until at least 22 hours after the last long acting insulin injection. You can set a 0% temporary basal setting that will last until this time and use your pump to bolus for meals and correction.  6.  If glucose continues to rise, or no fresh backup insulin is available, or unable to perform the above instructions. Please be evaluated in the ED.        For any technical insulin pump issues, Contact the insulin pump company representative to troubleshoot the problems. (Help numbers are on the back of the pump device)               Hypoglycemia Treatment - The "15-15 Rule".     If you experience an episode of hypoglycemia (glucose less than 70), follow the 15-15 rule.     Have 15 grams of carbohydrate to raise your blood sugar and recheck it after 15 minutes. If its still below 70 mg/dL, have another serving.     Repeat these steps until your blood sugar is at least 70 mg/dL. Once your blood sugar is back to normal, eat a meal or snack to make sure it doesnt lower again.     This may be:     4 ounces (1/2 cup) of juice or regular soda (not diet)  1 tablespoon of sugar, honey, or corn syrup  Hard candies, jellybeans, or gumdrops--see food label for how many to consume  Make a note about any episodes of low blood sugar and talk with your health care team about why it happened. They can suggest ways to avoid low blood sugar in the future.      Many people tend to want to eat as much as they can until they feel better. This can cause blood sugar levels to shoot way up. Using the step-wise approach of the "15-15 Rule" can help you " avoid this, preventing high blood sugar levels.     Note:  When treating a low, the choice of carbohydrate source is important. Complex carbohydrates, or foods that contain fats along with carbs (like chocolate) can slow the absorption of glucose and should not be used to treat an emergency low.     For more information, visit:  [email or web address]            Please notify your physician or me if  you have persistent low blood glucose <70 or persistent elevated glucose >250 as soon as possible in addition to the steps suggested above.

## 2022-02-09 NOTE — PROGRESS NOTES
"56 y.o. male here for 1.5 year follow up -   Last seen 12/2020 - those notes are below.   That was our initial consult, and then he followed with diabetes education - has been lost to follow since.     a1c now up from 10.5% ---> 12.2%.   Historically uncontrolled glucoses and diabetes -   States "I feel great!"   However admits "feels tired just when he works long hours/shifts" - states he is working almost 7 days per week.   Lives at home with his wife.   Reports staph infection in his left foot worsened, - had to amputate his large toe on his left toe and 2nd bone.   States this occurred in July 2021 -     Current meds:   Metformin 100 bid.   Had prescribed him jardiance - but he never took  - states the cost had been too much.   He was on MDI - and switched him to pump therapy -   (formerly on lantus 60 every HS and novolin R 25 units tid ac meals).     He began on medtronic pump shortly after I saw him - but then never followed up.   He is on medtronic 770 pump - his insurance has high cost for humalog or novolog.   He is on novolin R in his insulin pump. [facility].   He often does not bolus with meals - only 1 - 2 times per week -   He eats however 5 times per day - 3 meals and 2 snacks -   Has never carb counted before - but admits he usually just "estimates" how much insulin he needs.   (usually just 6 - 7 units with meals).     medtronic pump downloaded and reviewed today -   Insulin rate is at 1.5 units/hour -   Carb ratio is at 1: 6 - but he does not input carbs.   ISF 25 -   bg target 110 - 120   Manual mode is 100%   Average bg is 308 -   Total daily dose is 40 units   85% is coming from basal rate.   15% is coming from bolusing.   Active insulin time is set for 4 hours.     Checking sugars with a glucometer - Relion meter - doesn't have his meter with him.   Has never used cgm  Therapy.   I had ordered guardian for him to go with his pump - but never was covered or cost was " too much for him.   Last checked glucose yesterday.   Had small piece of lasagna for lunch - states gave 6 units of insulin   No acute changes in physical complaints.   Has seen eye doctor - states no acute changes in vision - still wears glasses -       Last visit notes from 12/2020:   HPI: Billy Rivera is a 56 y.o.  male c/I for visit to address Diabetes Type 2  This is the first time I am seeing him for care, follows with Dr. BRAD Baker MD for primary care needs.   Recently has switched to seeing dr. Foreman for primary care needs.   Has not seen endocrinology or diabetes specialist in the past.     He had recent covid 19 infection, but this has since resolved.   He was diagnosed with T2DM about 20 years ago  Family history - both sides strong history.   Has never been hospitalized r/t DM.  Denies missing doses of DM medication.     Current a1c @ 10.5%. Historically uncontrolled in past.   Current meds -   Metformin 1000 bid.   Began insulin shortly after his diagnosis.   lantus 55 units every night.   novolin R ac meals - 25 units tid.  (previously on novolog but insurance stopped covering).   Tried trulicity in his past, but caused severe constipation. Does not really want to try drug class again for that reason.   Has never tried SGLT2i's.     He is feeling well, does not feel when his sugars are high or low.  Does not titrate his insulin based on glucose readings. Generally always gives the full 25 units as his sugars just remain high regardless  Of what he eats or what he does.   He admits not always following a diabetic diet, finds it hard to eliminate carbs or limit them.     Complications from diabetes -   +Retinopathy.   + CV disease   + history of MI, Nstemi in May 2013. Got stent placed.   -nephropathy.   Large foot ulcer on left foot - receiving IV antibiotics via right upper arm picc line.   In boot with wound vac. Following with podiatry.        Past medical History:   Past Medical  History:   Diagnosis Date    Allergy     CAD (coronary artery disease), native coronary artery 6/25/2013    Cancer     COVID-19 virus detected 9/1/2020    Diabetes mellitus     Diabetes mellitus, type 2     Disorder of kidney and ureter     Heart attack 04/2012    Hx of colon cancer, stage I     Hyperlipidemia     Hypertension     Muscular pain     post-op after colonoscopy    NSTEMI May 2013 - peak troponin 0.22 5/22/2013    MICHAELA (obstructive sleep apnea)     Retinopathy due to secondary diabetes       Family hx:   Family History   Problem Relation Age of Onset    Heart disease Mother     Diabetes Mother     Diabetes Father     Heart disease Brother     Diabetes Maternal Grandmother     Diabetes Maternal Grandfather     Diabetes Paternal Grandmother     Diabetes Paternal Grandfather     Anesthesia problems Neg Hx       Current meds:   Current Outpatient Medications:     acetaminophen (TYLENOL) 325 MG tablet, Take 325 mg by mouth every 6 (six) hours as needed for Pain., Disp: , Rfl:     aspirin (ECOTRIN) 81 MG EC tablet, Take 1 tablet (81 mg total) by mouth once daily., Disp: , Rfl: 0    aspirin/salicylamide/caffeine (BC HEADACHE POWDER ORAL), Take 1 Package by mouth daily as needed (pain)., Disp: , Rfl:     atorvastatin (LIPITOR) 80 MG tablet, Take 1 tablet by mouth once daily, Disp: 30 tablet, Rfl: 0    blood sugar diagnostic (ACCU-CHEK GUIDE TEST STRIPS) Strp, 1 each by Misc.(Non-Drug; Combo Route) route 5 (five) times daily., Disp: 150 each, Rfl: 11    clotrimazole-betamethasone 1-0.05% (LOTRISONE) cream, Apply topically 2 (two) times daily., Disp: 45 g, Rfl: 2    ibuprofen (ADVIL,MOTRIN) 200 MG tablet, Take 200 mg by mouth every 6 (six) hours as needed for Pain., Disp: , Rfl:     lancets (ACCU-CHEK FASTCLIX LANCET DRUM) Misc, 1 each by Misc.(Non-Drug; Combo Route) route Daily., Disp: 30 each, Rfl: 11    lisinopriL (PRINIVIL,ZESTRIL) 40 MG tablet, Take 1 tablet by mouth once  "daily, Disp: 30 tablet, Rfl: 0    melatonin (MELATIN) 3 mg tablet, Take 2 tablets (6 mg total) by mouth nightly as needed for Insomnia., Disp:  , Rfl: 0    metFORMIN (GLUCOPHAGE) 1000 MG tablet, Take 1 tablet (1,000 mg total) by mouth 2 (two) times daily with meals., Disp: 60 tablet, Rfl: 11    pen needle, diabetic 31 gauge x 3/16" Ndle, Inject 1 each into the skin 3 (three) times daily before meals. (Patient taking differently: Inject 1 each into the skin 4 (four) times daily.), Disp: 100 each, Rfl: 12    blood-glucose sensor (DEXCOM G6 SENSOR) Haley, Apply/change sensor to skin every 10 days., Disp: 3 each, Rfl: 11    blood-glucose transmitter (DEXCOM G6 TRANSMITTER) Haley, Use transmitter in sensor with G6 system. Change new transmitter every 3 months., Disp: 1 each, Rfl: 3    carvediloL (COREG) 12.5 MG tablet, Take 1 tablet (12.5 mg total) by mouth 2 (two) times daily., Disp: 60 tablet, Rfl: 11    dapagliflozin (FARXIGA) 5 mg Tab tablet, Take 1 tablet (5 mg total) by mouth once daily., Disp: 30 tablet, Rfl: 6    insulin (LANTUS SOLOSTAR U-100 INSULIN) glargine 100 units/mL (3mL) SubQ pen, Inject 60 Units into the skin once daily., Disp: 15 mL, Rfl: 0    insulin lispro (HUMALOG U-100 INSULIN) 100 unit/mL injection, Inject 100 Units into the skin continuous. Gives via insulin pump only. Do not Directly inject., Disp: 50 mL, Rfl: 11    MINIMED 770G INSULIN PUMP Misc, 1 each by Misc.(Non-Drug; Combo Route) route continuous. Medically necessary for management of Type 2 diabetes, E11.65, Disp: 1 each, Rfl: 0     Current Diabetes medications:   novolin r via insulin pump (purchases from [employer])   Metformin 100mg po bid with food    Medications Tried and Failed:   trulicity - severe constipation   novolog - insurance wouldn't cover so switched to novolin r.  Long acting insulin: lantus 55 units every night.   Short acting insulin:  novolin r 25 units tid ac meals - takes 5 - 15 minutes prior to meals. " "  jardiance - was prescribed, but he never took as the cost was too much.     Review of Pertinent co-morbidities/risk factors:   CV: + history of MI   CAD: yes nstemi with stent.   Now history of left foot toe amputation.   Takes aspirin 81mg tablet daily  BP: has history of HTN  Statin: Taking  ACE/ARB: Taking    Social History     Tobacco Use   Smoking Status Never Smoker   Smokeless Tobacco Never Used        Social:   Lives at home with: wife.   Life changes/stressors currently: has ulcer to left foot - so off work for now while this heals.   Diet:  Not always following ADA diet   Meals: 3 per day and snacks.        Breakfast - cornflakes with milk, eggs, oatmeal       Lunch - precooked meals from [employer]. (meat/veggie/small carb).        Dinner - hot plate meals from restaurants - meat/veggie/starch. Chicken and vegetables, beef, occasionally rice.   Spaghetti and meatballs.        Snacks - doritos bags, cheetos, kind bar reduced sugar.        Drinks - diet tea, milk, diet lemonade, diet coke, water.   Exercise: nothing formal, but normally walking a lot in his work.   Activities: working full time as  at [employer].     Glucose Monitoring:   Checking sugars 4x/day by fingerstick.   CGM - has never used, Free style florentino and dexcom are both excluded from his insurance plan in the past -   Had prescribed guardian cgm - but never was covered.     Standards of care:   Eyes: .: 08/15/2019  Foot exam: : 09/20/2021   Diabetes education: yes - on start of insulin pump therapy.     Vital Signs  /80 (BP Location: Right arm, Patient Position: Sitting, BP Method: Medium (Manual))   Pulse 97   Temp 97.3 °F (36.3 °C) (Temporal)   Resp 14   Ht 6' 2" (1.88 m)   Wt 124.9 kg (275 lb 5.7 oz)   SpO2 98%   BMI 35.35 kg/m²     Pertinent Labs:   Hgba1c   Lab Results   Component Value Date    HGBA1C 12.2 (H) 01/26/2022    HGBA1C 10.5 (H) 12/22/2020    HGBA1C 10.8 (H) 10/13/2020     Lipid panel   Lab Results "   Component Value Date    CHOL 175 01/26/2022    CHOL 144 08/12/2020    CHOL 172 08/07/2019     Lab Results   Component Value Date    HDL 43 01/26/2022    HDL 39 (L) 08/12/2020    HDL 40 08/07/2019     Lab Results   Component Value Date    LDLCALC 93.2 01/26/2022    LDLCALC 66.6 08/12/2020    LDLCALC 100.8 08/07/2019     Lab Results   Component Value Date    TRIG 194 (H) 01/26/2022    TRIG 192 (H) 08/12/2020    TRIG 156 (H) 08/07/2019     Lab Results   Component Value Date    CHOLHDL 24.6 01/26/2022    CHOLHDL 27.1 08/12/2020    CHOLHDL 23.3 08/07/2019      CMP  Glucose   Date Value Ref Range Status   01/26/2022 265 (H) 70 - 110 mg/dL Final     BUN   Date Value Ref Range Status   01/26/2022 30 (H) 6 - 20 mg/dL Final     Creatinine   Date Value Ref Range Status   01/26/2022 1.3 0.5 - 1.4 mg/dL Final     eGFR if    Date Value Ref Range Status   01/26/2022 >60.0 >60 mL/min/1.73 m^2 Final     eGFR if non    Date Value Ref Range Status   01/26/2022 >60.0 >60 mL/min/1.73 m^2 Final     Comment:     Calculation used to obtain the estimated glomerular filtration  rate (eGFR) is the CKD-EPI equation.        AST   Date Value Ref Range Status   01/26/2022 26 10 - 40 U/L Final     ALT   Date Value Ref Range Status   01/26/2022 39 10 - 44 U/L Final     Microalbumin creatinine ratio:   Lab Results   Component Value Date    MICALBCREAT 415.8 (H) 01/26/2022       Review Of Systems:   Gen: Appetite good, no weight loss or gain,  No fatigue. Denies polydipsia.  Skin: Skin is intact and heals well, denies any rashes or hair changes.   EENT: Denies any acute visual disturbances, nor blurred vision.   Resp: Denies SOB or Dyspnea on exertion, denies cough.   Cardiac: Denies chest pain, palpitations, or swelling.   GI: Denies abdominal pain, nausea or vomiting, diarrhea, or constipation.   /GYN: + nocturia several times per night - nor burning, frequency or pain on urination.  MS/Neuro: Denies numbness/  tingling in BLE; unsteady Gait, speech clear  Psych: Denies drug/ETOH abuse, no hx of depression.  Other systems: negative.    Physical Exam:   GENERAL: Well developed, well nourished in appearance.   PSYCH: AAOx3, appropriate mood and affect, pleasant expression, conversant, appears relaxed, well groomed.   EYES: PERRL, Conjunctiva and corneas clear  NECK: Soft and Supple, trachea midline  CHEST: Even, regular, and unlabored respirations  ABDOMEN: Soft, non-tender, non-distended. Normal bowel sounds.   VASCULAR: pedal pulses palpable bilaterally, no edema.  NEURO:  cranial nerves II - XII intact   MUSCULOSKELETAL: Good ROM,  unsteady gait. Due to boot.   SKIN: Skin warm, dry, and intact   FEET: right foot is wnl.   Left foot in boot still - was on woundvac, now wrapped - s/p amputation now of toe.     Assessment and Plan of Care:     Billy was seen today for diabetes and follow-up.    Diagnoses and all orders for this visit:    Type 2 diabetes mellitus 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, with long-term current use of insulin  -     Discontinue: insulin lispro (HUMALOG U-100 INSULIN) 100 unit/mL injection; Inject 100 Units into the skin continuous. Gives via insulin pump only. Do not Directly inject.  -     Ambulatory referral/consult to Diabetes Education; Future  -     blood-glucose sensor (DEXCOM G6 SENSOR) Haley; Apply/change sensor to skin every 10 days.  -     blood-glucose transmitter (DEXCOM G6 TRANSMITTER) Haley; Use transmitter in sensor with G6 system. Change new transmitter every 3 months.  -     insulin lispro (HUMALOG U-100 INSULIN) 100 unit/mL injection; Inject 100 Units into the skin continuous. Gives via insulin pump only. Do not Directly inject.  -     insulin (LANTUS SOLOSTAR U-100 INSULIN) glargine 100 units/mL (3mL) SubQ pen; Inject 60 Units into the skin once daily.  -     POCT Glucose, Hand-Held Device    Hypertension associated with  diabetes    Dyslipidemia associated with type 2 diabetes mellitus    Type 2 diabetes mellitus with diabetic peripheral angiopathy without gangrene, with long-term current use of insulin    Class 2 severe obesity with body mass index (BMI) of 35 to 39.9 with serious comorbidity    Amputation of toe of left foot    Coronary artery disease involving coronary bypass graft of native heart without angina pectoris    Other orders  -     dapagliflozin (FARXIGA) 5 mg Tab tablet; Take 1 tablet (5 mg total) by mouth once daily.       1. T2DM with hyperglycemia- Hgba1c goal is 7.5% or less without hypoglycemia - 11.5%--> 10.8%--> 10.5%--> 12.2%    discussed DM, progression of disease, long term complications, CV risk factors and tx options.   Has already had MI in past.   Continue metformin 1000 bid.   Start SGLT2i. Even though he has had amputation of left foot - I still think the benefit of this class outweighs the risk -   Starting on low dose farxiga 5 - gave vouchers.   Discussed MOA, possible side effects including yeast infection, UTI, dehydration and ketoacidosis, importance of maintaining hydration and avoiding No carb diets. Good use hygiene. Notify my office if any side effects. Will monitor renal function closely. Stable at present.   Continue same MDI for now -   Sent in humalog and gave him sawyer voucher for $35/month vials.   Would rather him be on novolog instead of novolin R due to onset of action time and safety profile.   Continue on medtronic 770 insulin pump   - start dexcom g6 - personal - sending orders.   A CGM is MEDICALLY NECESSARY as it will allow me to virtually and more closely monitor glucose readings  This enables me to make any necessary adjustments to the patients diabetes regimen while keeping the patient and staff safe during the COVID-19 PHE.Meet with Diabetes education to start/train on this upcoming Friday.   Encouraged to bolus for meals -   Entered in presets for him - 10 units for  meals.   5 units for a snack.   Advised to please avoid fast food, chips, processed foods.   Increased basal rate from 1.5 to 1.8 u/hour.    Gave him rx of lantus 60 units for back up insulin in case of pump failure.   Patient likes to talk/text/use his smart phone so looking forward to insulin pump and getting sugars under control.   Advise compliance with ADA diet and encourage exercise  Reviewed  hypoglycemia, s/s and appropriate tx. Have/get quick acting glucose tablets at hand.   Instructed to monitor Blood glucose 2 - 4x/day and bring meter/ log to every clinic visit.   Until gets on dexcom - use relion meter. Enter glucoses into pump prior to bolus.     2. HTN - controlled, continue meds as previously prescribed and monitor.   Coreg 12.5 po bid.   Lisinopril 40.   Urine mac + 202 ---> 415 -   Starting sglt2i.     3. HLD - LDL goal < 100. He is at goal.   Currently on statin therapy- lipitor 80 mg every HS.   History of MI/nstemi.   On aspirin daily.     4. Weight - BMI Body mass index is 35.35 kg/m².   Encourage Ada diet and exercise.     5. Renal Function - stable. No history of nephropathy.   Has clinical urine mac +     6. Left foot - had infection - now resulted in amputation - advised to gain compliance and not miss meal time boluses.   The high sugars are responsible culprit for the infection.        Nurse visit in 1 week to remove the free style florentino Pro.   Meet with Nurse educator nimisha sandoval to train on pump and new cgm.   Follow up in 6 weeks with OV and bg logs.

## 2022-02-10 NOTE — SUBJECTIVE & OBJECTIVE
Past Medical History:   Diagnosis Date    Allergy     CAD (coronary artery disease), native coronary artery 6/25/2013    Cancer     Diabetes mellitus     Diabetes mellitus, type 2     Disorder of kidney and ureter     Heart attack 04/2012    Hyperlipidemia     Hypertension     Muscular pain     post-op after colonoscopy       Past Surgical History:   Procedure Laterality Date    COLONOSCOPY N/A 2/17/2017    Procedure: COLONOSCOPY;  Surgeon: Simon Gomez MD;  Location: [facility] (4TH FLR);  Service: Endoscopy;  Laterality: N/A;    CORONARY ANGIOPLASTY      INCISION AND DRAINAGE FOOT Right 6/5/2018    Procedure: INCISION AND DRAINAGE, FOOT;  Surgeon: Bridget Santana DPM;  Location: [facility] OR 1ST FLR;  Service: Podiatry;  Laterality: Right;  Request mini C arm in room. request wound vac with medium black sponge    INCISION AND DRAINAGE OF ABSCESS Right 6/2/2018    Procedure: INCISION AND DRAINAGE, ABSCESS;  Surgeon: Bridget Santana DPM;  Location: [facility] OR 2ND FLR;  Service: General;  Laterality: Right;    TONSILLECTOMY         Review of patient's allergies indicates:   Allergen Reactions    Penicillins Other (See Comments)     PCN allergy as a child - was told he went into a coma. Tolerates Cefazolin without adverse reactions    Shellfish containing products      Other reaction(s): Unknown    Vancomycin Itching    Bactrim  [sulfamethoxazole-trimethoprim] Rash       PTA Medications   Medication Sig    carvedilol (COREG) 12.5 MG tablet Take 1 tablet (12.5 mg total) by mouth 2 (two) times daily.    aspirin 81 MG Chew Take 81 mg by mouth every evening. swallows    atorvastatin (LIPITOR) 80 MG tablet Take 1 tablet (80 mg total) by mouth once daily.    blood sugar diagnostic Strp Strips and lancets    Use before meals and bedtime    ciprofloxacin HCl (CIPRO) 500 MG tablet Take 1 tablet (500 mg total) by mouth every 12 (twelve) hours.    clindamycin (CLEOCIN) 300 MG capsule Take 1 capsule (300  "mg total) by mouth every 6 (six) hours.    dulaglutide (TRULICITY) 1.5 mg/0.5 mL PnIj Inject 1.5 mg into the skin every 7 days.    ibuprofen (ADVIL,MOTRIN) 800 MG tablet Take 1 tablet (800 mg total) by mouth 3 (three) times daily as needed for Pain.    insulin glargine (LANTUS SOLOSTAR) 100 unit/mL (3 mL) InPn pen INJECT 55 UNITS SUBCUTANEOUSLY IN THE EVENING    insulin lispro (HUMALOG) 100 unit/mL injection Inject 20 Units into the skin 3 (three) times daily before meals.    lisinopril (PRINIVIL,ZESTRIL) 40 MG tablet Take 1 tablet (40 mg total) by mouth once daily.    metFORMIN (GLUCOPHAGE) 1000 MG tablet Take 1 tablet (1,000 mg total) by mouth 2 (two) times daily with meals.    nitroGLYCERIN (NITROSTAT) 0.4 MG SL tablet Place 1 tablet (0.4 mg total) under the tongue every 5 (five) minutes as needed for Chest pain.    pen needle, diabetic 31 gauge x 3/16" Ndle Inject 1 each into the skin 3 (three) times daily before meals.     Family History     Problem Relation (Age of Onset)    Heart disease Mother, Brother        Social History Main Topics    Smoking status: Never Smoker    Smokeless tobacco: Never Used    Alcohol use Yes      Comment: rare x1 beer-     Drug use: No    Sexual activity: Not Currently     Review of Systems   All other systems reviewed and are negative.    Objective:     Vital Signs (Most Recent):  Temp: 98.6 °F (37 °C) (06/07/18 0407)  Pulse: 66 (06/07/18 0407)  Resp: 18 (06/07/18 0407)  BP: (!) 115/58 (06/07/18 0407)  SpO2: 96 % (06/07/18 0407) Vital Signs (24h Range):  Temp:  [98.5 °F (36.9 °C)-101.1 °F (38.4 °C)] 98.6 °F (37 °C)  Pulse:  [61-75] 66  Resp:  [16-18] 18  SpO2:  [92 %-98 %] 96 %  BP: (109-139)/(58-76) 115/58     Weight: 122.5 kg (270 lb)  Body mass index is 35.62 kg/m².    SpO2: 96 %  O2 Device (Oxygen Therapy): room air      Intake/Output Summary (Last 24 hours) at 06/07/18 0730  Last data filed at 06/07/18 0408   Gross per 24 hour   Intake             1330 ml "   Output             1100 ml   Net              230 ml       Lines/Drains/Airways     Peripheral Intravenous Line                 Peripheral IV - Single Lumen 06/04/18 0539 Left Forearm 3 days                Physical Exam  General: alert, awake and oriented x 3  Eyes:PERRL.   Neck:no JVD   Lungs:  clear to auscultation bilaterally   Cardiovascular: Heart: regular rate and rhythm, S1, S2 normal, no murmur, click, rub or gallop.   Chest Wall: no tenderness.   Extremities: no cyanosis or edema.   Abdomen/Rectal: Abdomen: soft, non-tender non-distented; bowel sounds normal  Neurologic: Normal strength and tone. No focal numbness or weakness     LEFT RIGHT   RADIAL 2+ 2+   ULNAR     BRACHIAL     FEMORAL 2+ 2+   DP 2+ 2+   TP 2+ 2+   HUNTER'S TEST Normal Normal   BARBEAU TEST         Significant Labs:   CMP   Recent Labs  Lab 06/06/18 0624 06/07/18  0339   * 135*   K 3.9 4.0   CL 97 99   CO2 26 27   * 251*   BUN 10 10   CREATININE 1.0 1.0   CALCIUM 8.0* 8.3*   PROT 6.4 6.4   ALBUMIN 2.0* 2.0*   BILITOT 0.4 0.3   ALKPHOS 115 103   AST 33 33   ALT 27 26   ANIONGAP 9 9   ESTGFRAFRICA >60.0 >60.0   EGFRNONAA >60.0 >60.0   , CBC   Recent Labs  Lab 06/06/18 0624 06/07/18  0339   WBC 13.16* 10.51   HGB 11.8* 11.7*   HCT 36.0* 35.3*    257    and INR No results for input(s): INR, PROTIME in the last 48 hours.    Significant Imaging: Echocardiogram:   2D echo with color flow doppler:   Results for orders placed or performed during the hospital encounter of 05/22/13   2D Echocardiogram with Color Flow Doppler   Result Value Ref Range    EF 60     Diastolic Dysfunction No       left side chest/ ribs

## 2022-02-15 ENCOUNTER — TELEPHONE (OUTPATIENT)
Dept: DIABETES | Facility: CLINIC | Age: 57
End: 2022-02-15
Payer: COMMERCIAL

## 2022-02-16 ENCOUNTER — PATIENT MESSAGE (OUTPATIENT)
Dept: RESEARCH | Facility: HOSPITAL | Age: 57
End: 2022-02-16
Payer: COMMERCIAL

## 2022-02-16 NOTE — PROGRESS NOTES
Subjective:      Patient ID: Billy Rivera is a 56 y.o. male.    Chief Complaint: Wound Care (Both feet )    Chronic wound left and right foot.  Changing dressing daily at home every other day.  Ambulating in surgical shoe left  casual shoe right. No new complaints today.  He has been alternating Iodosorb and Medihoney.  No new issues.      Hemoglobin A1C   Date Value Ref Range Status   01/26/2022 12.2 (H) 4.0 - 5.6 % Final     Comment:     ADA Screening Guidelines:  5.7-6.4%  Consistent with prediabetes  >or=6.5%  Consistent with diabetes    High levels of fetal hemoglobin interfere with the HbA1C  assay. Heterozygous hemoglobin variants (HbS, HgC, etc)do  not significantly interfere with this assay.   However, presence of multiple variants may affect accuracy.     12/22/2020 10.5 (H) 4.0 - 5.6 % Final     Comment:     ADA Screening Guidelines:  5.7-6.4%  Consistent with prediabetes  >or=6.5%  Consistent with diabetes  High levels of fetal hemoglobin interfere with the HbA1C  assay. Heterozygous hemoglobin variants (HbS, HgC, etc)do  not significantly interfere with this assay.   However, presence of multiple variants may affect accuracy.     10/13/2020 10.8 (H) 4.0 - 5.6 % Final     Comment:     ADA Screening Guidelines:  5.7-6.4%  Consistent with prediabetes  >or=6.5%  Consistent with diabetes  High levels of fetal hemoglobin interfere with the HbA1C  assay. Heterozygous hemoglobin variants (HbS, HgC, etc)do  not significantly interfere with this assay.   However, presence of multiple variants may affect accuracy.             Patient Active Problem List   Diagnosis    Coronary artery disease involving native coronary artery of native heart without angina pectoris    BDR (background diabetic retinopathy) - Both Eyes    Uncontrolled type II diabetes mellitus    PVD (peripheral vascular disease)    Sleep apnea    Proteinuria    Edema    Hypertension associated with diabetes    Type 2 diabetes  mellitus with diabetic peripheral angiopathy without gangrene, with long-term current use of insulin    Onychomycosis of multiple toenails with type 2 diabetes mellitus    Type 2 diabetes mellitus 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, with long-term current use of insulin    Hyponatremia    Anemia    Hypomagnesemia    Diabetic foot ulcer associated with type 2 diabetes mellitus    Insomnia    Hx of colon cancer, stage I    Dyslipidemia associated with type 2 diabetes mellitus    Diabetes mellitus with insulin therapy    Obesity, diabetes, and hypertension syndrome    Class 2 severe obesity with body mass index (BMI) of 35 to 39.9 with serious comorbidity    Diabetes mellitus with microalbuminuria    Diabetes mellitus with peripheral circulatory disorder    Diabetic foot infection    Coronary artery disease involving coronary bypass graft of native heart without angina pectoris    Osteomyelitis of left foot    Amputation of toe of left foot       Current Outpatient Medications on File Prior to Visit   Medication Sig Dispense Refill    acetaminophen (TYLENOL) 325 MG tablet Take 325 mg by mouth every 6 (six) hours as needed for Pain.      aspirin (ECOTRIN) 81 MG EC tablet Take 1 tablet (81 mg total) by mouth once daily.  0    aspirin/salicylamide/caffeine (BC HEADACHE POWDER ORAL) Take 1 Package by mouth daily as needed (pain).      atorvastatin (LIPITOR) 80 MG tablet Take 1 tablet by mouth once daily 30 tablet 0    blood sugar diagnostic (ACCU-CHEK GUIDE TEST STRIPS) Strp 1 each by Misc.(Non-Drug; Combo Route) route 5 (five) times daily. 150 each 11    clotrimazole-betamethasone 1-0.05% (LOTRISONE) cream Apply topically 2 (two) times daily. 45 g 2    ibuprofen (ADVIL,MOTRIN) 200 MG tablet Take 200 mg by mouth every 6 (six) hours as needed for Pain.      lancets (ACCU-CHEK FASTCLIX LANCET DRUM) Misc 1 each by Misc.(Non-Drug; Combo Route) route  "Daily. 30 each 11    lisinopriL (PRINIVIL,ZESTRIL) 40 MG tablet Take 1 tablet by mouth once daily 30 tablet 0    melatonin (MELATIN) 3 mg tablet Take 2 tablets (6 mg total) by mouth nightly as needed for Insomnia.  0    metFORMIN (GLUCOPHAGE) 1000 MG tablet Take 1 tablet (1,000 mg total) by mouth 2 (two) times daily with meals. 60 tablet 11    pen needle, diabetic 31 gauge x 3/16" Ndle Inject 1 each into the skin 3 (three) times daily before meals. (Patient taking differently: Inject 1 each into the skin 4 (four) times daily.) 100 each 12    blood-glucose sensor (DEXCOM G6 SENSOR) Haley Apply/change sensor to skin every 10 days. 3 each 11    blood-glucose transmitter (DEXCOM G6 TRANSMITTER) Haley Use transmitter in sensor with G6 system. Change new transmitter every 3 months. 1 each 3    carvediloL (COREG) 12.5 MG tablet Take 1 tablet (12.5 mg total) by mouth 2 (two) times daily. 60 tablet 11    dapagliflozin (FARXIGA) 5 mg Tab tablet Take 1 tablet (5 mg total) by mouth once daily. 30 tablet 6    insulin (LANTUS SOLOSTAR U-100 INSULIN) glargine 100 units/mL (3mL) SubQ pen Inject 60 Units into the skin once daily. 15 mL 0    insulin lispro (HUMALOG U-100 INSULIN) 100 unit/mL injection Inject 100 Units into the skin continuous. Gives via insulin pump only. Do not Directly inject. 50 mL 11    MINIMED 770G INSULIN PUMP Misc 1 each by Misc.(Non-Drug; Combo Route) route continuous. Medically necessary for management of Type 2 diabetes, E11.65 1 each 0     No current facility-administered medications on file prior to visit.       Review of patient's allergies indicates:   Allergen Reactions    Penicillins Other (See Comments)     PCN allergy as a child - was told he went into a coma. Tolerates Cefazolin without adverse reactions    Penicillin     Shellfish containing products      Other reaction(s): Unknown    Vancomycin Itching     Tolerated vancomycin 7/2020    Bactrim  [sulfamethoxazole-trimethoprim] Rash "       Past Surgical History:   Procedure Laterality Date    COLONOSCOPY N/A 2/17/2017    Procedure: COLONOSCOPY;  Surgeon: Simno Gomez MD;  Location: [facility] ENDO (4TH FLR);  Service: Endoscopy;  Laterality: N/A;    CORONARY ANGIOPLASTY      INCISION AND DRAINAGE FOOT Right 6/5/2018    Procedure: INCISION AND DRAINAGE, FOOT;  Surgeon: Bridget Santana DPM;  Location: [facility] OR 1ST FLR;  Service: Podiatry;  Laterality: Right;  Request mini C arm in room. request wound vac with medium black sponge    INCISION AND DRAINAGE FOOT Right 6/7/2018    Procedure: INCISION AND DRAINAGE, FOOT;  Surgeon: Emelia Brock DPM;  Location: [facility] OR 2ND FLR;  Service: Podiatry;  Laterality: Right;    INCISION AND DRAINAGE FOOT Left 9/4/2020    Procedure: INCISION AND DRAINAGE, FOOT;  Surgeon: Ainsley Powell DPM;  Location: [facility] OR 2ND FLR;  Service: Podiatry;  Laterality: Left;    INCISION AND DRAINAGE FOOT Left 12/22/2020    Procedure: INCISION AND DRAINAGE, FOOT;  Surgeon: Ainsley Powell DPM;  Location: [facility] OR 2ND FLR;  Service: Podiatry;  Laterality: Left;  May need micro choice  Need Jam shidi needles  Stretcher OK      INCISION AND DRAINAGE OF ABSCESS Right 6/2/2018    Procedure: INCISION AND DRAINAGE, ABSCESS;  Surgeon: Bridget Snatana DPM;  Location: [facility] OR 2ND FLR;  Service: General;  Laterality: Right;    OSTEOTOMY OF METATARSAL BONE  9/4/2020    Procedure: OSTEOTOMY, METATARSAL BONE, 1st METATARSAL RESECTION;  Surgeon: Ainsley Powell DPM;  Location: [facility] OR 2ND FLR;  Service: Podiatry;;    REMOVAL OF FOREIGN BODY FROM FOOT Right 6/7/2018    Procedure: REMOVAL, FOREIGN BODY, FOOT;  Surgeon: Emelia Brock DPM;  Location: [facility] OR 2ND FLR;  Service: Podiatry;  Laterality: Right;    TOE AMPUTATION Left 7/4/2020    Procedure: AMPUTATION, TOE;  Surgeon: Bridget Santana DPM;  Location: [facility] OR 2ND FLR;  Service: Podiatry;  Laterality: Left;    TOE AMPUTATION Left 10/14/2020    Procedure: AMPUTATION, TOE;   Surgeon: Mingo Melara DPM;  Location: [facility] OR [address] [place] [address];  Service: Podiatry;  Laterality: Left;  stretcher OK    TONSILLECTOMY         Family History   Problem Relation Age of Onset    Heart disease Mother     Diabetes Mother     Diabetes Father     Heart disease Brother     Diabetes Maternal Grandmother     Diabetes Maternal Grandfather     Diabetes Paternal Grandmother     Diabetes Paternal Grandfather     Anesthesia problems Neg Hx        Social History     Socioeconomic History    Marital status:    Tobacco Use    Smoking status: Never Smoker    Smokeless tobacco: Never Used   Substance and Sexual Activity    Alcohol use: Yes     Comment: rare x1 beer-     Drug use: No    Sexual activity: Not Currently           Review of Systems   Constitutional: Negative for chills, fever, malaise/fatigue and night sweats.   Cardiovascular: Negative for claudication, cyanosis and leg swelling.   Skin: Positive for poor wound healing. Negative for color change, itching, rash and suspicious lesions.   Musculoskeletal: Negative for falls, gout, joint pain and myalgias.   Neurological: Positive for numbness and sensory change.           Objective:       Vitals:    02/09/22 0856   BP: 131/79   Pulse: 67   Weight: 118.4 kg (261 lb 0.4 oz)   PainSc: 0-No pain       Physical Exam  Constitutional:       General: He is not in acute distress.     Appearance: He is well-developed. He is not diaphoretic.   Cardiovascular:      Pulses:           Dorsalis pedis pulses are 1+ on the right side and 1+ on the left side.        Posterior tibial pulses are 1+ on the right side and 1+ on the left side.      Comments: Toes warm, pink, cap fill <5 seconds.  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     Skin:     General: Skin is warm and dry.      Coloration:  "Skin is not pale.      Findings: see wound description below    Neurological:      Comments: Diminished/loss of protective sensation all toes bilateral to 10 gram monofilament.     Psychiatric:         Behavior: Behavior is cooperative.         Ulcer location: plantar partial 1st ray amputation stump, left  Measurements : 2.0x0.2x0.1cm , unchanged since last visit  Signs of infection:local edema, malodor  Drainage: Sero-Sanguinous  Purulence: no  Crepitus/fluctuance: no  Periwound: Reddened, Macerated, Calloused  Base: Mixed Granular/Fibrotic  Depth: subcutaneous tissue  Probe to bone: no                  Assessment:       Encounter Diagnoses   Name Primary?    Type II diabetes mellitus with neurological manifestations Yes    Foot ulcer, left, with fat layer exposed     Foot ulcer, right, with fat layer exposed          Plan:       Billy was seen today for wound care.    Diagnoses and all orders for this visit:    Type II diabetes mellitus with neurological manifestations    Foot ulcer, left, with fat layer exposed    Foot ulcer, right, with fat layer exposed        Education about the prevention of limb loss.    Discussed wound healing cycle, skin integrity, ways to care for skin.Counseled patient on the effects of high blood glucose on healing. He verbalizes understanding that it can increase the chances of delayed healing and this prolonged exposure leads to infection or progression of infection which subsequently can result in loss of limb.    Adequate vitamin supplementation, protein intake, and hydration - discussed with patient    Wound Debridement    Performed by: Mingo Melara DPM   Authorized by: Patient    Time out: Immediately prior to procedure a "time out" was called to verify the correct patient, procedure, equipment, support staff and site/side marked as required.   Consent Done?:  Yes (Verbal)  Local anesthesia used?: No       Wound Details:    Location:   left foot     Type of Debridement: "  Excisional       Length (cm):   2.0       Width (cm):   0.2       Depth (cm):   0.1       Percent Debrided (%):  100             Depth of debridement:  Subcutaneous tissue    Tissue debrided:  Dermis, Epidermis and Subcutaneous    Devitalized tissue debrided:  Biofilm, Callus and Necrotic/Eschar    Instruments:  Blade, Curette and Nippers     Bleeding:  Minimal  Hemostasis Achieved: Yes    Method Used:  Pressure  Patient tolerance:  Patient tolerated the procedure well with no immediate complications    The nature of the condition, options for management, as well as potential risks and complications were discussed in detail with patient. Patient was amenable to my recommendations and left my office fully informed and will follow up as instructed or sooner if necessary.      Wound care discussed in detail.  Begin Iodosorb and Leticia dressing every other day with dry sterile dressing left foot.  Betadine right foot to stable callus.  Continue Darco bilateral.  May transition issue on the right foot next visit.  Will re-evaluate at that time.

## 2022-02-23 ENCOUNTER — OFFICE VISIT (OUTPATIENT)
Dept: PODIATRY | Facility: CLINIC | Age: 57
End: 2022-02-23
Payer: COMMERCIAL

## 2022-02-23 ENCOUNTER — OFFICE VISIT (OUTPATIENT)
Dept: INTERNAL MEDICINE | Facility: CLINIC | Age: 57
End: 2022-02-23
Payer: COMMERCIAL

## 2022-02-23 VITALS
DIASTOLIC BLOOD PRESSURE: 86 MMHG | BODY MASS INDEX: 35.34 KG/M2 | HEIGHT: 74 IN | HEART RATE: 79 BPM | SYSTOLIC BLOOD PRESSURE: 145 MMHG | WEIGHT: 275.38 LBS

## 2022-02-23 VITALS
OXYGEN SATURATION: 98 % | DIASTOLIC BLOOD PRESSURE: 80 MMHG | RESPIRATION RATE: 13 BRPM | BODY MASS INDEX: 35.35 KG/M2 | SYSTOLIC BLOOD PRESSURE: 118 MMHG | TEMPERATURE: 98 F | HEIGHT: 74 IN

## 2022-02-23 DIAGNOSIS — E11.3299 BDR (BACKGROUND DIABETIC RETINOPATHY): ICD-10-CM

## 2022-02-23 DIAGNOSIS — E11.3393 TYPE 2 DIABETES MELLITUS WITH BOTH EYES AFFECTED BY MODERATE NONPROLIFERATIVE RETINOPATHY WITHOUT MACULAR EDEMA, WITH LONG-TERM CURRENT USE OF INSULIN: Primary | ICD-10-CM

## 2022-02-23 DIAGNOSIS — E66.9 OBESITY, DIABETES, AND HYPERTENSION SYNDROME: ICD-10-CM

## 2022-02-23 DIAGNOSIS — E11.69 OBESITY, DIABETES, AND HYPERTENSION SYNDROME: ICD-10-CM

## 2022-02-23 DIAGNOSIS — E11.59 HYPERTENSION ASSOCIATED WITH DIABETES: ICD-10-CM

## 2022-02-23 DIAGNOSIS — E11.59 OBESITY, DIABETES, AND HYPERTENSION SYNDROME: ICD-10-CM

## 2022-02-23 DIAGNOSIS — E11.51 DIABETES MELLITUS WITH PERIPHERAL CIRCULATORY DISORDER: ICD-10-CM

## 2022-02-23 DIAGNOSIS — E11.69 DYSLIPIDEMIA ASSOCIATED WITH TYPE 2 DIABETES MELLITUS: ICD-10-CM

## 2022-02-23 DIAGNOSIS — Z79.4 TYPE 2 DIABETES MELLITUS WITH DIABETIC PERIPHERAL ANGIOPATHY WITHOUT GANGRENE, WITH LONG-TERM CURRENT USE OF INSULIN: ICD-10-CM

## 2022-02-23 DIAGNOSIS — I15.2 HYPERTENSION ASSOCIATED WITH DIABETES: ICD-10-CM

## 2022-02-23 DIAGNOSIS — L97.512 ULCER OF RIGHT FOOT WITH FAT LAYER EXPOSED: Primary | ICD-10-CM

## 2022-02-23 DIAGNOSIS — Z96.41 INSULIN PUMP IN PLACE: ICD-10-CM

## 2022-02-23 DIAGNOSIS — E11.51 TYPE 2 DIABETES MELLITUS WITH DIABETIC PERIPHERAL ANGIOPATHY WITHOUT GANGRENE, WITH LONG-TERM CURRENT USE OF INSULIN: ICD-10-CM

## 2022-02-23 DIAGNOSIS — E78.5 DYSLIPIDEMIA ASSOCIATED WITH TYPE 2 DIABETES MELLITUS: ICD-10-CM

## 2022-02-23 DIAGNOSIS — I15.2 OBESITY, DIABETES, AND HYPERTENSION SYNDROME: ICD-10-CM

## 2022-02-23 DIAGNOSIS — Z79.4 TYPE 2 DIABETES MELLITUS WITH BOTH EYES AFFECTED BY MODERATE NONPROLIFERATIVE RETINOPATHY WITHOUT MACULAR EDEMA, WITH LONG-TERM CURRENT USE OF INSULIN: Primary | ICD-10-CM

## 2022-02-23 DIAGNOSIS — S98.132A AMPUTATION OF TOE OF LEFT FOOT: ICD-10-CM

## 2022-02-23 PROCEDURE — 3077F PR MOST RECENT SYSTOLIC BLOOD PRESSURE >= 140 MM HG: ICD-10-PCS | Mod: CPTII,S$GLB,, | Performed by: PODIATRIST

## 2022-02-23 PROCEDURE — 3046F HEMOGLOBIN A1C LEVEL >9.0%: CPT | Mod: CPTII,S$GLB,, | Performed by: NURSE PRACTITIONER

## 2022-02-23 PROCEDURE — 11043 DBRDMT MUSC&/FSCA 1ST 20/<: CPT | Mod: RT,S$GLB,, | Performed by: PODIATRIST

## 2022-02-23 PROCEDURE — 3008F PR BODY MASS INDEX (BMI) DOCUMENTED: ICD-10-PCS | Mod: CPTII,S$GLB,, | Performed by: NURSE PRACTITIONER

## 2022-02-23 PROCEDURE — 99999 PR PBB SHADOW E&M-EST. PATIENT-LVL IV: ICD-10-PCS | Mod: PBBFAC,,, | Performed by: PODIATRIST

## 2022-02-23 PROCEDURE — 11043 PR DEBRIDEMENT, SKIN, SUB-Q TISSUE,MUSCLE,=<20 SQ CM: ICD-10-PCS | Mod: RT,S$GLB,, | Performed by: PODIATRIST

## 2022-02-23 PROCEDURE — 99213 PR OFFICE/OUTPT VISIT, EST, LEVL III, 20-29 MIN: ICD-10-PCS | Mod: 25,S$GLB,, | Performed by: PODIATRIST

## 2022-02-23 PROCEDURE — 3062F PR POS MACROALBUMINURIA RESULT DOCUMENTED/REVIEW: ICD-10-PCS | Mod: CPTII,S$GLB,, | Performed by: PODIATRIST

## 2022-02-23 PROCEDURE — 3079F DIAST BP 80-89 MM HG: CPT | Mod: CPTII,S$GLB,, | Performed by: NURSE PRACTITIONER

## 2022-02-23 PROCEDURE — 1159F MED LIST DOCD IN RCRD: CPT | Mod: CPTII,S$GLB,, | Performed by: NURSE PRACTITIONER

## 2022-02-23 PROCEDURE — 3008F BODY MASS INDEX DOCD: CPT | Mod: CPTII,S$GLB,, | Performed by: NURSE PRACTITIONER

## 2022-02-23 PROCEDURE — 3046F HEMOGLOBIN A1C LEVEL >9.0%: CPT | Mod: CPTII,S$GLB,, | Performed by: PODIATRIST

## 2022-02-23 PROCEDURE — 3066F NEPHROPATHY DOC TX: CPT | Mod: CPTII,S$GLB,, | Performed by: PODIATRIST

## 2022-02-23 PROCEDURE — 99999 PR PBB SHADOW E&M-EST. PATIENT-LVL IV: CPT | Mod: PBBFAC,,, | Performed by: PODIATRIST

## 2022-02-23 PROCEDURE — 99999 PR PBB SHADOW E&M-EST. PATIENT-LVL IV: CPT | Mod: PBBFAC,,, | Performed by: NURSE PRACTITIONER

## 2022-02-23 PROCEDURE — 3062F PR POS MACROALBUMINURIA RESULT DOCUMENTED/REVIEW: ICD-10-PCS | Mod: CPTII,S$GLB,, | Performed by: NURSE PRACTITIONER

## 2022-02-23 PROCEDURE — 3046F PR MOST RECENT HEMOGLOBIN A1C LEVEL > 9.0%: ICD-10-PCS | Mod: CPTII,S$GLB,, | Performed by: PODIATRIST

## 2022-02-23 PROCEDURE — 99215 PR OFFICE/OUTPT VISIT, EST, LEVL V, 40-54 MIN: ICD-10-PCS | Mod: S$GLB,,, | Performed by: NURSE PRACTITIONER

## 2022-02-23 PROCEDURE — 99999 PR PBB SHADOW E&M-EST. PATIENT-LVL IV: ICD-10-PCS | Mod: PBBFAC,,, | Performed by: NURSE PRACTITIONER

## 2022-02-23 PROCEDURE — 3066F NEPHROPATHY DOC TX: CPT | Mod: CPTII,S$GLB,, | Performed by: NURSE PRACTITIONER

## 2022-02-23 PROCEDURE — 3062F POS MACROALBUMINURIA REV: CPT | Mod: CPTII,S$GLB,, | Performed by: NURSE PRACTITIONER

## 2022-02-23 PROCEDURE — 3074F SYST BP LT 130 MM HG: CPT | Mod: CPTII,S$GLB,, | Performed by: NURSE PRACTITIONER

## 2022-02-23 PROCEDURE — 3008F PR BODY MASS INDEX (BMI) DOCUMENTED: ICD-10-PCS | Mod: CPTII,S$GLB,, | Performed by: PODIATRIST

## 2022-02-23 PROCEDURE — 3066F PR DOCUMENTATION OF TREATMENT FOR NEPHROPATHY: ICD-10-PCS | Mod: CPTII,S$GLB,, | Performed by: PODIATRIST

## 2022-02-23 PROCEDURE — 3079F DIAST BP 80-89 MM HG: CPT | Mod: CPTII,S$GLB,, | Performed by: PODIATRIST

## 2022-02-23 PROCEDURE — 3079F PR MOST RECENT DIASTOLIC BLOOD PRESSURE 80-89 MM HG: ICD-10-PCS | Mod: CPTII,S$GLB,, | Performed by: PODIATRIST

## 2022-02-23 PROCEDURE — 4010F PR ACE/ARB THEARPY RXD/TAKEN: ICD-10-PCS | Mod: CPTII,S$GLB,, | Performed by: NURSE PRACTITIONER

## 2022-02-23 PROCEDURE — 3074F PR MOST RECENT SYSTOLIC BLOOD PRESSURE < 130 MM HG: ICD-10-PCS | Mod: CPTII,S$GLB,, | Performed by: NURSE PRACTITIONER

## 2022-02-23 PROCEDURE — 1159F PR MEDICATION LIST DOCUMENTED IN MEDICAL RECORD: ICD-10-PCS | Mod: CPTII,S$GLB,, | Performed by: NURSE PRACTITIONER

## 2022-02-23 PROCEDURE — 3062F POS MACROALBUMINURIA REV: CPT | Mod: CPTII,S$GLB,, | Performed by: PODIATRIST

## 2022-02-23 PROCEDURE — 4010F PR ACE/ARB THEARPY RXD/TAKEN: ICD-10-PCS | Mod: CPTII,S$GLB,, | Performed by: PODIATRIST

## 2022-02-23 PROCEDURE — 3008F BODY MASS INDEX DOCD: CPT | Mod: CPTII,S$GLB,, | Performed by: PODIATRIST

## 2022-02-23 PROCEDURE — 99215 OFFICE O/P EST HI 40 MIN: CPT | Mod: S$GLB,,, | Performed by: NURSE PRACTITIONER

## 2022-02-23 PROCEDURE — 99213 OFFICE O/P EST LOW 20 MIN: CPT | Mod: 25,S$GLB,, | Performed by: PODIATRIST

## 2022-02-23 PROCEDURE — 3066F PR DOCUMENTATION OF TREATMENT FOR NEPHROPATHY: ICD-10-PCS | Mod: CPTII,S$GLB,, | Performed by: NURSE PRACTITIONER

## 2022-02-23 PROCEDURE — 4010F ACE/ARB THERAPY RXD/TAKEN: CPT | Mod: CPTII,S$GLB,, | Performed by: PODIATRIST

## 2022-02-23 PROCEDURE — 3077F SYST BP >= 140 MM HG: CPT | Mod: CPTII,S$GLB,, | Performed by: PODIATRIST

## 2022-02-23 PROCEDURE — 4010F ACE/ARB THERAPY RXD/TAKEN: CPT | Mod: CPTII,S$GLB,, | Performed by: NURSE PRACTITIONER

## 2022-02-23 PROCEDURE — 3046F PR MOST RECENT HEMOGLOBIN A1C LEVEL > 9.0%: ICD-10-PCS | Mod: CPTII,S$GLB,, | Performed by: NURSE PRACTITIONER

## 2022-02-23 PROCEDURE — 1160F PR REVIEW ALL MEDS BY PRESCRIBER/CLIN PHARMACIST DOCUMENTED: ICD-10-PCS | Mod: CPTII,S$GLB,, | Performed by: NURSE PRACTITIONER

## 2022-02-23 PROCEDURE — 1160F RVW MEDS BY RX/DR IN RCRD: CPT | Mod: CPTII,S$GLB,, | Performed by: NURSE PRACTITIONER

## 2022-02-23 PROCEDURE — 3079F PR MOST RECENT DIASTOLIC BLOOD PRESSURE 80-89 MM HG: ICD-10-PCS | Mod: CPTII,S$GLB,, | Performed by: NURSE PRACTITIONER

## 2022-02-23 RX ORDER — DULAGLUTIDE 0.75 MG/.5ML
0.75 INJECTION, SOLUTION SUBCUTANEOUS WEEKLY
Qty: 4 PEN | Refills: 6 | Status: SHIPPED | OUTPATIENT
Start: 2022-02-23 | End: 2022-03-23

## 2022-02-23 RX ORDER — INSULIN LISPRO 100 [IU]/ML
100 INJECTION, SOLUTION INTRAVENOUS; SUBCUTANEOUS CONTINUOUS
Qty: 50 ML | Refills: 11 | Status: ON HOLD | OUTPATIENT
Start: 2022-02-23 | End: 2022-11-28 | Stop reason: SDUPTHER

## 2022-02-23 RX ORDER — SEMAGLUTIDE 1.34 MG/ML
0.5 INJECTION, SOLUTION SUBCUTANEOUS
Qty: 1 PEN | Refills: 3 | Status: SHIPPED | OUTPATIENT
Start: 2022-02-23 | End: 2022-02-23

## 2022-02-23 RX ORDER — FLASH GLUCOSE SENSOR
1 KIT MISCELLANEOUS
Qty: 6 KIT | Refills: 3 | Status: SHIPPED | OUTPATIENT
Start: 2022-02-23 | End: 2022-03-23

## 2022-02-23 NOTE — PROGRESS NOTES
56 y.o. gentleman, here for 2 week follow up for management of T2dm -   Last seen 2/9/2022 - those notes are below.     Historically uncontrolled - last a1c is @ 12.2%.   Had ulcers on foot, followed by staph infection, and then sustained a toe amputation.   On metformin 1000 bid.   I prescribed him farxiga - 5mg tablet daily - but he didn't start as the cost was too high.     Continues on medtronic 770 insulin pump - I had increased his rates at last visit -   He had been on novolin R in his pump as this was cheaper he found at [facility]. I switched him to humalog - gave voucher for $35 through [employer] - however stated it was still too expensive.   As his sugars running continuously high - we made adjustments to his pump. He feels this has helped somewhat.     Pump downloaded today -   Reviewed - see scanned in  -   100% in manual mode   TDD is 59.4 units/day   68% coming from basal rate.   32% coming from bolusing.   He does not carb count.   Uses preset boluses 5 units for a snack, 10 units for meal time boluses.   He boluses anywhere from 1 - 2 times per day on average in looking at his report.     Settings:   Active insulin time is 4 hours.   Basal rate is at 1.8 units/hour.   Carb ratio is 1: 6   ISF is at 1: 25   bg target is 110 - 125     He does not use cgm - states has high deductible.   Had tried to order him a guardian cgm in the past - to be closed loop with his pump, but he never did purchase.   He had a florentino Pro placed in office in December 2020 but did not continue on or use a personal.   He checks his glucose by fingerstick.   No logs with him today.   His sugar today in clinic is at 355. I showed him how to give a correction bolus on his pump and we walked through to use the bolus wizard together.   I had ordered him a dexcom at last visit however he never got b/c the cost was too high.   (it was $700 so he didn't  ).         Last visit notes from 2/9/2022 as follows:   56 y.o.  "male here for 1.5 year follow up -   Last seen 12/2020 - those notes are below.   That was our initial consult, and then he followed with diabetes education - has been lost to follow since.     a1c now up from 10.5% ---> 12.2%.   Historically uncontrolled glucoses and diabetes -   States "I feel great!"   However admits "feels tired just when he works long hours/shifts" - states he is working almost 7 days per week.   Lives at home with his wife.   Reports staph infection in his left foot worsened, - had to amputate his large toe on his left toe and 2nd bone.   States this occurred in July 2021 -     Current meds:   Metformin 100 bid.   Had prescribed him jardiance - but he never took  - states the cost had been too much.   He was on MDI - and switched him to pump therapy -   (formerly on lantus 60 every HS and novolin R 25 units tid ac meals).     He began on medtronic pump shortly after I saw him - but then never followed up.   He is on medtronic 770 pump - his insurance has high cost for humalog or novolog.   He is on novolin R in his insulin pump. Found cheaper at [facility] by the vial.   He often does not bolus with meals - only 1 - 2 times per week -   He eats however 5 times per day - 3 meals and 2 snacks -   Has never carb counted before - but admits he usually just "estimates" how much insulin he needs.   (usually just 6 - 7 units with meals).     medtronic pump downloaded and reviewed today -   Insulin rate is at 1.5 units/hour -   Carb ratio is at 1: 6 - but he does not input carbs.   ISF 25 -   bg target 110 - 120   Manual mode is 100%   Average bg is 308 -   Total daily dose is 40 units   85% is coming from basal rate.   15% is coming from bolusing.   Active insulin time is set for 4 hours.     Checking sugars with a glucometer - Relion meter - doesn't have his meter with him.   Has never used cgm  Therapy.   I had ordered guardian for him to go with his pump - but never was covered or cost was too much " for him.   Last checked glucose yesterday.   Had small piece of lasagna for lunch - states gave 6 units of insulin   No acute changes in physical complaints.   Has seen eye doctor - states no acute changes in vision - still wears glasses -       Last visit notes from 12/2020:   HPI: Billy Rivera is a 56 y.o.  male c/I for visit to address Diabetes Type 2  This is the first time I am seeing him for care, follows with Dr. BRAD Baker MD for primary care needs.   Recently has switched to seeing dr. Foreman for primary care needs.   Has not seen endocrinology or diabetes specialist in the past.     He had recent covid 19 infection, but this has since resolved.   He was diagnosed with T2DM about 20 years ago  Family history - both sides strong history.   Has never been hospitalized r/t DM.  Denies missing doses of DM medication.     Current a1c @ 10.5%. Historically uncontrolled in past.   Current meds -   Metformin 1000 bid.   Began insulin shortly after his diagnosis.   lantus 55 units every night.   novolin R ac meals - 25 units tid.  (previously on novolog but insurance stopped covering).   Tried trulicity in his past, but caused severe constipation. Does not really want to try drug class again for that reason.   Has never tried SGLT2i's.     He is feeling well, does not feel when his sugars are high or low.  Does not titrate his insulin based on glucose readings. Generally always gives the full 25 units as his sugars just remain high regardless  Of what he eats or what he does.   He admits not always following a diabetic diet, finds it hard to eliminate carbs or limit them.     Complications from diabetes -   +Retinopathy.   + CV disease   + history of MI, Nstemi in May 2013. Got stent placed.   -nephropathy.   Large foot ulcer on left foot - receiving IV antibiotics via right upper arm picc line.   In boot with wound vac. Following with podiatry.        Past medical History:   Past Medical History:    Diagnosis Date    Allergy     CAD (coronary artery disease), native coronary artery 6/25/2013    Cancer     COVID-19 virus detected 9/1/2020    Diabetes mellitus     Diabetes mellitus, type 2     Disorder of kidney and ureter     Heart attack 04/2012    Hx of colon cancer, stage I     Hyperlipidemia     Hypertension     Muscular pain     post-op after colonoscopy    NSTEMI May 2013 - peak troponin 0.22 5/22/2013    MICHAELA (obstructive sleep apnea)     Retinopathy due to secondary diabetes       Family hx:   Family History   Problem Relation Age of Onset    Heart disease Mother     Diabetes Mother     Diabetes Father     Heart disease Brother     Diabetes Maternal Grandmother     Diabetes Maternal Grandfather     Diabetes Paternal Grandmother     Diabetes Paternal Grandfather     Anesthesia problems Neg Hx       Current meds:   Current Outpatient Medications:     acetaminophen (TYLENOL) 325 MG tablet, Take 325 mg by mouth every 6 (six) hours as needed for Pain., Disp: , Rfl:     aspirin (ECOTRIN) 81 MG EC tablet, Take 1 tablet (81 mg total) by mouth once daily., Disp: , Rfl: 0    aspirin/salicylamide/caffeine (BC HEADACHE POWDER ORAL), Take 1 Package by mouth daily as needed (pain)., Disp: , Rfl:     atorvastatin (LIPITOR) 80 MG tablet, Take 1 tablet by mouth once daily, Disp: 30 tablet, Rfl: 0    blood sugar diagnostic (ACCU-CHEK GUIDE TEST STRIPS) Strp, 1 each by Misc.(Non-Drug; Combo Route) route 5 (five) times daily., Disp: 150 each, Rfl: 11    clotrimazole-betamethasone 1-0.05% (LOTRISONE) cream, Apply topically 2 (two) times daily., Disp: 45 g, Rfl: 2    ibuprofen (ADVIL,MOTRIN) 200 MG tablet, Take 200 mg by mouth every 6 (six) hours as needed for Pain., Disp: , Rfl:     insulin (LANTUS SOLOSTAR U-100 INSULIN) glargine 100 units/mL (3mL) SubQ pen, Inject 60 Units into the skin once daily., Disp: 15 mL, Rfl: 0    lancets (ACCU-CHEK FASTCLIX LANCET DRUM) Misc, 1 each by  "Misc.(Non-Drug; Combo Route) route Daily., Disp: 30 each, Rfl: 11    lisinopriL (PRINIVIL,ZESTRIL) 40 MG tablet, Take 1 tablet by mouth once daily, Disp: 30 tablet, Rfl: 0    melatonin (MELATIN) 3 mg tablet, Take 2 tablets (6 mg total) by mouth nightly as needed for Insomnia., Disp:  , Rfl: 0    metFORMIN (GLUCOPHAGE) 1000 MG tablet, Take 1 tablet (1,000 mg total) by mouth 2 (two) times daily with meals., Disp: 60 tablet, Rfl: 11    pen needle, diabetic 31 gauge x 3/16" Ndle, Inject 1 each into the skin 3 (three) times daily before meals. (Patient taking differently: Inject 1 each into the skin 4 (four) times daily.), Disp: 100 each, Rfl: 12    carvediloL (COREG) 12.5 MG tablet, Take 1 tablet (12.5 mg total) by mouth 2 (two) times daily., Disp: 60 tablet, Rfl: 11    dulaglutide (TRULICITY) 0.75 mg/0.5 mL pen injector, Inject 0.75 mg into the skin once a week., Disp: 4 pen, Rfl: 6    insulin lispro (HUMALOG U-100 INSULIN) 100 unit/mL injection, Inject 100 Units into the skin continuous. Gives via insulin pump only. Do not Directly inject., Disp: 50 mL, Rfl: 11    MINIMED 770G INSULIN PUMP Misc, 1 each by Misc.(Non-Drug; Combo Route) route continuous. Medically necessary for management of Type 2 diabetes, E11.65, Disp: 1 each, Rfl: 0     Current Diabetes medications:   novolin r via insulin pump (purchases from [facility])   Metformin 1000 mg po bid with food    Medications Tried and Failed:   trulicity - severe constipation   novolog - insurance wouldn't cover so switched to novolin r.  Long acting insulin: lantus 55 units every night.   Short acting insulin:  novolin r 25 units tid ac meals - takes 5 - 15 minutes prior to meals.   jardiance - was prescribed, but he never took as the cost was too much.   farxiga - was prescribed     Review of Pertinent co-morbidities/risk factors:   CV: + history of MI   CAD: yes nstemi with stent.   Now history of left foot toe amputation.   Takes aspirin 81mg tablet daily  BP: " "has history of HTN  Statin: Taking  ACE/ARB: Taking    Social History     Tobacco Use   Smoking Status Never Smoker   Smokeless Tobacco Never Used        Social:   Lives at home with: wife.   Life changes/stressors currently: has ulcer to left foot - so off work for now while this heals.   Diet:  Not always following ADA diet   Meals: 3 per day and snacks.        Breakfast - cornflakes with milk, eggs, oatmeal       Lunch - precooked meals from [employer]. (meat/veggie/small carb).        Dinner - hot plate meals from restaurants - meat/veggie/starch. Chicken and vegetables, beef, occasionally rice.   Spaghetti and meatballs.        Snacks - doritos bags, cheetos, kind bar reduced sugar.        Drinks - diet tea, milk, diet lemonade, diet coke, water.   Exercise: nothing formal, but normally walking a lot in his work.   Activities: was working full time as  at [employer]. No longer working.     Glucose Monitoring:   Checking sugars 4x/day by fingerstick.  Has relion meter.    CGM - has never used, Free style florentino and dexcom are both excluded from his insurance plan in the past -   Had prescribed guardian cgm - but never was covered.     Standards of care:   Eyes: .: 08/15/2019  Foot exam: : 09/20/2021   Diabetes education: yes - on start of insulin pump therapy.     Vital Signs  /80 (BP Location: Right arm, Patient Position: Sitting, BP Method: Medium (Manual))   Temp 98.2 °F (36.8 °C) (Temporal)   Resp 13   Ht 6' 2" (1.88 m)   SpO2 98%   BMI 35.35 kg/m²     Pertinent Labs:   Hgba1c   Lab Results   Component Value Date    HGBA1C 12.2 (H) 01/26/2022    HGBA1C 10.5 (H) 12/22/2020    HGBA1C 10.8 (H) 10/13/2020     Lipid panel   Lab Results   Component Value Date    CHOL 175 01/26/2022    CHOL 144 08/12/2020    CHOL 172 08/07/2019     Lab Results   Component Value Date    HDL 43 01/26/2022    HDL 39 (L) 08/12/2020    HDL 40 08/07/2019     Lab Results   Component Value Date    LDLCALC 93.2 01/26/2022 "    LDLCALC 66.6 08/12/2020    LDLCALC 100.8 08/07/2019     Lab Results   Component Value Date    TRIG 194 (H) 01/26/2022    TRIG 192 (H) 08/12/2020    TRIG 156 (H) 08/07/2019     Lab Results   Component Value Date    CHOLHDL 24.6 01/26/2022    CHOLHDL 27.1 08/12/2020    CHOLHDL 23.3 08/07/2019      CMP  Glucose   Date Value Ref Range Status   01/26/2022 265 (H) 70 - 110 mg/dL Final     BUN   Date Value Ref Range Status   01/26/2022 30 (H) 6 - 20 mg/dL Final     Creatinine   Date Value Ref Range Status   01/26/2022 1.3 0.5 - 1.4 mg/dL Final     eGFR if    Date Value Ref Range Status   01/26/2022 >60.0 >60 mL/min/1.73 m^2 Final     eGFR if non    Date Value Ref Range Status   01/26/2022 >60.0 >60 mL/min/1.73 m^2 Final     Comment:     Calculation used to obtain the estimated glomerular filtration  rate (eGFR) is the CKD-EPI equation.        AST   Date Value Ref Range Status   01/26/2022 26 10 - 40 U/L Final     ALT   Date Value Ref Range Status   01/26/2022 39 10 - 44 U/L Final     Microalbumin creatinine ratio:   Lab Results   Component Value Date    MICALBCREAT 415.8 (H) 01/26/2022       Review Of Systems:   Gen: Appetite good, no weight loss or gain,  No fatigue. Denies polydipsia.  Skin: Skin is intact and heals well, denies any rashes or hair changes.   EENT: Denies any acute visual disturbances, nor blurred vision.   Resp: Denies SOB or Dyspnea on exertion, denies cough.   Cardiac: Denies chest pain, palpitations, or swelling.   GI: Denies abdominal pain, nausea or vomiting, diarrhea, or constipation.   /GYN: + nocturia several times per night - nor burning, frequency or pain on urination.  MS/Neuro: Denies numbness/ tingling in BLE; unsteady Gait, uses boot.   speech clear  Psych: Denies drug/ETOH abuse, no hx of depression.  Other systems: negative.    Physical Exam:   GENERAL: Well developed, well nourished in appearance.   PSYCH: AAOx3, appropriate mood and affect, pleasant  expression, conversant, appears relaxed, well groomed.   EYES: PERRL, Conjunctiva and corneas clear  NECK: Soft and Supple, trachea midline  CHEST: Even, regular, and unlabored respirations  ABDOMEN: Soft, non-tender, non-distended. Normal bowel sounds.   VASCULAR: pedal pulses palpable bilaterally, no edema.  NEURO:  cranial nerves II - XII intact   MUSCULOSKELETAL: Good ROM,  unsteady gait. Due to boot on right foot.   SKIN: Skin warm, dry, and intact   FEET: right foot in boot - amputation on left foot/large toe.     Assessment and Plan of Care:     Billy was seen today for diabetes and follow-up.    Diagnoses and all orders for this visit:    Type 2 diabetes mellitus 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, with long-term current use of insulin  -     POCT Glucose, Hand-Held Device  -     insulin lispro (HUMALOG U-100 INSULIN) 100 unit/mL injection; Inject 100 Units into the skin continuous. Gives via insulin pump only. Do not Directly inject.  -     dulaglutide (TRULICITY) 0.75 mg/0.5 mL pen injector; Inject 0.75 mg into the skin once a week.    Hypertension associated with diabetes    Dyslipidemia associated with type 2 diabetes mellitus    Amputation of toe of left foot    Type 2 diabetes mellitus with diabetic peripheral angiopathy without gangrene, with long-term current use of insulin    Obesity, diabetes, and hypertension syndrome    BDR (background diabetic retinopathy) - Both Eyes    Insulin pump in place    Other orders  -     Discontinue: OZEMPIC 0.25 mg or 0.5 mg(2 mg/1.5 mL) pen injector; Inject 0.25mg under the skin every week for 2 weeks. Then increase to 0.5mg every week thereafter.       1. T2DM with hyperglycemia- Hgba1c goal is 7.5% or less without hypoglycemia - 11.5%--> 10.8%--> 10.5%--> 12.2%    discussed DM, progression of disease, long term complications, CV risk factors and tx options.   Has already had MI in past.   Continue metformin  1000 bid.   Sent in humalog and gave him [employer] voucher for $35/month vials.   Would rather him be on novolog instead of novolin R due to onset of action time and safety profile.   Continue on medtronic 770 insulin pump - increased presets from 10 units with meals to 15 units with meals.   Start on florentino 2 personal today - gave him a reader sample to go with it.   His phone is not compatible. With florentino 2 or regular florentino priyanka.   A CGM is MEDICALLY NECESSARY as it will allow me to virtually and more closely monitor glucose readings  This enables me to make any necessary adjustments to the patients diabetes regimen while keeping the patient and staff safe during the COVID-19 PHE.Meet with Diabetes education to start/train on this upcoming Friday.   Encouraged to bolus for meals -   Entered in presets for him at last visit - 10 units for meals. -- increased to 15 units today for meals.   Gave a 10 unit setting to use for a smaller meal.   5 units for a snack.   Advised to please avoid fast food, chips, processed foods.   Increased basal rate from 1.5 to 1.8 u/hour at last visit --- increase to 1.9 units/hour today.   Gave him rx of lantus or back up insulin in case of pump failure.   Patient likes to talk/text/use his smart phone so looking forward to insulin pump and getting sugars under control.   He is willing to try glp1 again -   Start Trulicity 0.75mg SC weekly. Give injection 1 time per week.   No history of pancreatitis or medullary thyroid cancer.   May plan to increase dose of trulicity if tolerated well.   Notify me if side effects such as severe abdominal pain, nausea, diarrhea.     Advise compliance with ADA diet and encourage exercise  Reviewed  hypoglycemia, s/s and appropriate tx. Have/get quick acting glucose tablets at hand.   Instructed to monitor Blood glucose 2 - 4x/day and bring meter/ log to every clinic visit.   Until gets on florentino 2  - use relion meter. Enter glucoses into pump prior to bolus.      2. HTN - controlled, continue meds as previously prescribed and monitor.   Coreg 12.5 po bid.   Lisinopril 40.   Urine mac + 202 ---> 415 -     3. HLD - LDL goal < 100. He is at goal.   Currently on statin therapy- lipitor 80 mg every HS.   History of MI/nstemi.   On aspirin daily.     4. Weight - BMI Body mass index is 35.35 kg/m².   Encourage Ada diet and exercise.   Restart glp1.     5. Renal Function - stable. No history of nephropathy.   Has clinical urine mac +     6. Left foot - had infection - now resulted in amputation - advised to gain compliance and not miss meal time boluses.   The high sugars are responsible culprit for the infection.       Venu 2 - applying today.   Restart trulicity,.   Follow up in 4 weeks time.

## 2022-02-23 NOTE — PATIENT INSTRUCTIONS
"Continue to use your insulin pump -   If your pump should fail - you can use back up insulin pens - Lantus 50 units once per day  or novolin N 25 untis twice per day (every 12 hours).   and short acting insulin   (Novolog or novolin r) 15 units with meals     Make sure to bolus with your meals -   Bolus before meals or snacks.     Continue metformin 1000 mg twice per day.     Try to restart Trulicity injection once per week -   Rotate injection sites.   Give once per week only.   Eat small frequent meals.   Follow diabetic diet.   If you do feel some constipation - take colace daily to prevent the constipation.   For constipation over 3 days, take miralax - laxative OTC.     For low blood sugar - remember to keep glucose tablets on hand. You can purchase these at the pharmacy check out desk/over the counter.   If blood sugar is less than 70, take/eat 2 - 3 tablets quickly to bring your sugar up.   Always keep 15 grams of Quick acting carbohydrates on hand to eat/drink if your sugar is low - examples are 1/2 cup of juice, coke, or crackers, granola bars.   Remember to eat meals frequently to prevent low blood blood sugars.      Low Carb Snacks & Diabetes friendly foods   Aim for 30 - 40 grams of carbs for 3 meals per day (or 2 meals and  1 - 2 snacks - however you prefer)  Snacks can be 15 - 20 grams of carbs.     Eating small, frequent meals will help you to feel more satisfied, and more full so that you don't over eat or eat the wrong foods later.   Also, gonsalo meals/snacks allow you to spread carbohydrates evenly, which may stabilize blood sugars.   Below you will find a list of ideas of foods that I like/prefer.   Feel free to give me your ideas and tips if you find good ones too!   Overall - please remember to limit refined sugars such as soft drinks, juices, rices, pastas, breads, cakes.   Look at the back of the label - look at the amount of "carbohydrates", then look at the amount of "fiber" - subtract the " "amount of fiber from the amount of carbs - this is your "net carb intake".  The more fiber a food has, the better generally, as you get to subtract this from the net carbs.     0-5 gm carb  Crystal Light flavoring (I like fruit punch flavor!)  Vitamin Water Zero  Abby antioxidant drinks.   Sparkling ice (long skinny flavored water bottles for $1 that are sugar free).   Sheila water, WaterLoo - carbonated flavored blanc, various flavors.  Diet coke, diet barqs, sprite zero.  Diet sunkist (orange drink)  Sugar free powerade  Herbal tea, unsweetened  2 tsp peanut butter on celery or carrots  Regina's original Candies - (the Sugar Free ones!)  1/2 cup sugar-free jell-o  1 sugar-free popsicle  ¼ cup blueberries or strawberries  8oz Blue Mari unsweetened almond milk (or there is sugar free vanilla flavored as well).  5 baby carrots & celery sticks, cucumbers, bell peppers dipped in ¼ cup salsa, 2Tbsp light ranch dressing or 2Tbsp plain Greek yogurt  10 Goldfish crackers  ½ oz low-fat cheese or string cheese  1 closed handful of nuts, unsalted (example-->almonds, pistachios, cashews, peanuts, etc).  1 Tbsp of sunflower seeds, unsalted  1 cup Smart Pop popcorn  1 whole grain brown rice cake   "Think Thin" protein bars - my personal favorite is Creamy Peanut butter, chocolate brownie, or oreo flavors.  "Oglesby" bars  - can be found at [employer] grocery store - they have 5 grams of net carbohydrates.  Quest bars (my favorite is birthday cake, and also cinnamon roll is good melted for 10 seconds in the microwave - please remove the wrapper first!)  Premier protein shakes - sold at [employer], or other brand alternatives - usually 1 - 2 grams of carbs (strawberry, vanilla, chocolate flavors) Coffee flavor is my new morning favorite!   Scrambled eggs! Or a fried egg or boiled eggs - add sliced tomatoes, cilantro or some chopped green onions.   Eggs are good with any and all veggies! You can even eat them for dinner or in a low " "carb tortilla, or served with your favorite grilled meat/sausage or ball is my favorite.   Check out pre-made egg scrambles in the egg grocery store section - Ore Yadira "just crack an egg" is premixed cheese, ball and small amount of pototes, small cup size portions for your breakfast - very convenient!   Maria Guadalupe pierre - zucchini - can buy in the frozen foods section. Birds eye brand is usually cheap. Tip - saute with oil/onions or italian seasoning and use this as a pasta substitution.  Milk - is usually high in carbs/sugar - use Serious Parody milk brand instead - it is "filtered" milk - with half the amount of carbs of regular milk. (next to the milk in milk aisle).   Smuckers sugar free Breakfast syrup (or 1 tablespoon of low sugar breakfast syrup) instead of regular syrup.        15 gm carb  1 small piece of fruit or ½ banana or 1/2 cup lite canned fruit  3 arik cracker squares  3 cups Smart Pop popcorn, top spray butter, Cook lite salt or cinnamon and Truvia  5 Vanilla Wafers  ½ cup low fat, no added sugar ice cream or frozen yogurt (Blue bell, Blue Bunny, Weight Watchers, Skinny Cow)  1/2 - 1 cup Light n' fit Vanilla yogurt (has added protein in it to make you feel full).   ½ turkey, ham, or chicken sandwich  ½ c fruit with ½ c Cottage cheese  4-6 unsalted wheat crackers with 1 oz low fat cheese or 1 tbsp peanut butter   30-45 goldfish crackers (depending on flavor)   7-8 Uatsdin mini brown rice cakes (caramel, apple cinnamon, chocolate)   12 Uatsdin mini brown rice cakes (cheddar, bbq, ranch)   1/3 cup hummus dip with raw veg  1/2 whole wheat aundrea, 1Tbsp hummus  Mini Pizza (1/2 whole wheat English muffin, low-fat  cheese, tomato sauce)  100 calorie snack pack (Oreo, Chips Ahoy, Ritz Mix, Baked Cheetos)  4-6 oz. light or Greek Style yogurt (Chobani, Yoplait, Okios, [place])  ½ cup sugar-free pudding    6 in. wheat tortilla or aundrea oven toasted chips (topped with spray butter flavoring, cinnamon, Truvia OR " "spray butter, garlic powder, chili powder)   18 BBQ Popchips (available at [employer], [employer], [employer])  Mini bagel (small size) toasted - add fat free cream cheese or avocado and sliced tomato on top - yum!   1/2 cup Halo top icecream - birthday cake is my go-to flavor.   Truth Bars - can be found at [employer] - Net carbs is 11 - 12 grams depending on the flavor.   Kind bars = mostly nuts and dried berries - find at most local groceries stores, drug stores, whole foods, fresh market. Net carbs is around 10 grams.     Smoothie Alfonzo - I'm often asked "what smoothie is healthy for me". Get the 20 oz. Size.   Do not be decieved to think that all smoothies are "healthy". In fact, most are loaded with hidden sugars.   Here are some from the menu that you are allowed to have being diabetic:   The gladiator - any flavor. This is the lowest in carbs (3 - 5 grams only)  Keto champ (berry, chocolate or coffee - all have 14 - 19 grams of carbs in 20 oz size).   The Lean 1 smoothies - vanilla, strawberry and chocolate have under 30 grams of carbs, so this is slightly more. But would be ok for a meal substitute or snack.   The Shredder in Vanilla or chocolate only.  17 grams of carbs - (the strawberry has more sugar considerably)    Tips and Tricks:     Eat an extra vegetable once per day - green veggies (such as broccoli, cauliflower, green beans) - make you feel full and are low in sugar.     My favorite "secret" seasoning to make things extra yummy is cinnamon for sweet taste   For an added secret "salty" taste - add "everything but the bagel" seasoning (you can get on amazon.com or at  joes. I have seen at local groceries such as [employer] too!).     Dark colored fruits are your friend -- blueberries, strawberries, raspberries, blackberries. They have a lower sugar content. So will be the best choice for you.   Light colored fruits are NOT your friend - they tend to be higher in sugar and are not the best options " "for an ADA diet - examples are oranges, bananas, peaches, watermelon, -- you can eat these, but carefully and in moderation.     WATER. I cannot emphasize drinking water enough - it will hydrate you, make you full. Your body needs it - it's a natural appetite suppressant.   Sometimes hunger and thirst can feel the same. Try drinking some water first, then eat if you are still hungry.     Other snack choices   Celery with peanut butter  Celery with tuna salad  Dill pickles and cheddar cheese (no kidding, it's a great combo)  Nuts (keep raw ones in the freezer if you think you'll overeat them)  Sunflower seeds (get them in the shell so it will take longer to eat them)  Other seeds (How to Toast Pumpkin or Squash Seeds)   Pistachios or almonds - will fill you up and are tasty!   Low-Carb Trail Mix  Jerky (beef or turkey -- try to find low-sugar varieties)  Salami slices (you can find in the deli section)  Cheese sticks, such as string cheese  Sugar-free Jello, alone or with cottage cheese and a sprinkling of nuts. Make sugar-free lime Jello with part coconut milk -- For a large package, dissolve the powder in a cup of boiling water, add a can of coconut milk, and then add the rest of the water. Stir well.  Pepperoni "chips" -- Zap the slices in the microwave  Cheese with a few apple slices  4-ounce plain or sugar-free yogurt with berries and flax seed meal  Smoked salmon and cream cheese on cucumber slices  Lettuce Roll-ups -- Roll luncheon meat, egg salad, tuna or other filling and veggies in lettuce leaves  Lunch Meat Roll-ups -- Roll cheese or veggies in lunch meat (read the labels for carbs on the lunch meat)  Spread bean dip, spinach dip, or other low-carb dip or spread on the lunch meat or lettuce and then roll it up  Raw veggies and spinach dip, or other low-carb dip  Pork rinds (Chicharrón), with or without dip  Ricotta cheese with fruit and/or nuts and/or flax seed meal  Mushrooms with cheese spread inside (or " other spreads or dips)  Low-carb snack bars (watch out for sugar alcohols, especially maltitol)  Product Review: Atkins Advantage Bars  Pepperoni Chips -- Microwave pepperoni slices until crisp. Great with cheeses and dips  Garlic Parmesan Flax Seed Crackers  Parmesan Crisps -- Good when you want a crunchy snack.  Peanut Butter Protein Balls

## 2022-02-24 ENCOUNTER — TELEPHONE (OUTPATIENT)
Dept: INTERNAL MEDICINE | Facility: CLINIC | Age: 57
End: 2022-02-24
Payer: COMMERCIAL

## 2022-02-24 NOTE — TELEPHONE ENCOUNTER
Pt vijay his sensor had fallen off during his slumber, let the pt know to come in for a nurse visit on tomorrow.

## 2022-02-24 NOTE — TELEPHONE ENCOUNTER
----- Message from Melody Morales sent at 2/24/2022 11:11 AM CST -----  Contact: Pt [phone]  Pt called and stated that the sensor that was put on yesterday has fallen off.    Please call and advise

## 2022-02-25 ENCOUNTER — TELEPHONE (OUTPATIENT)
Dept: INTERNAL MEDICINE | Facility: CLINIC | Age: 57
End: 2022-02-25
Payer: COMMERCIAL

## 2022-02-25 NOTE — TELEPHONE ENCOUNTER
----- Message from Yennifer Echevarria sent at 2/25/2022 12:37 PM CST -----  Pt has requested a call to discuss his glucose monitor. Pt call back number is [phone].          Thanks

## 2022-03-04 NOTE — SUBJECTIVE & OBJECTIVE
Past Medical History:   Diagnosis Date    Allergy     CAD (coronary artery disease), native coronary artery 6/25/2013    Cancer     Diabetes mellitus     Diabetes mellitus, type 2     Disorder of kidney and ureter     Heart attack 04/2012    Hx of colon cancer, stage I     Hyperlipidemia     Hypertension     Muscular pain     post-op after colonoscopy    MICHAELA (obstructive sleep apnea)        Past Surgical History:   Procedure Laterality Date    COLONOSCOPY N/A 2/17/2017    Procedure: COLONOSCOPY;  Surgeon: Simon Gomez MD;  Location: [facility] ENDO (4TH FLR);  Service: Endoscopy;  Laterality: N/A;    CORONARY ANGIOPLASTY      INCISION AND DRAINAGE FOOT Right 6/5/2018    Procedure: INCISION AND DRAINAGE, FOOT;  Surgeon: Bridget Santana DPM;  Location: [facility] OR 1ST FLR;  Service: Podiatry;  Laterality: Right;  Request mini C arm in room. request wound vac with medium black sponge    INCISION AND DRAINAGE FOOT Right 6/7/2018    Procedure: INCISION AND DRAINAGE, FOOT;  Surgeon: Emelia Brock DPM;  Location: [facility] OR 2ND FLR;  Service: Podiatry;  Laterality: Right;    INCISION AND DRAINAGE OF ABSCESS Right 6/2/2018    Procedure: INCISION AND DRAINAGE, ABSCESS;  Surgeon: Bridget Santana DPM;  Location: [facility] OR 2ND FLR;  Service: General;  Laterality: Right;    REMOVAL OF FOREIGN BODY FROM FOOT Right 6/7/2018    Procedure: REMOVAL, FOREIGN BODY, FOOT;  Surgeon: Emelia Brock DPM;  Location: [facility] OR 2ND FLR;  Service: Podiatry;  Laterality: Right;    TONSILLECTOMY         Review of patient's allergies indicates:   Allergen Reactions    Penicillins Other (See Comments)     PCN allergy as a child - was told he went into a coma. Tolerates Cefazolin without adverse reactions    Shellfish containing products      Other reaction(s): Unknown    Vancomycin Itching    Bactrim  [sulfamethoxazole-trimethoprim] Rash       No current facility-administered medications on file prior to encounter.      Current  "Outpatient Medications on File Prior to Encounter   Medication Sig    aspirin (ECOTRIN) 81 MG EC tablet Take 1 tablet (81 mg total) by mouth once daily.    atorvastatin (LIPITOR) 80 MG tablet Take 1 tablet (80 mg total) by mouth once daily.    blood sugar diagnostic Strp Strips and lancets    Use before meals and bedtime    carvediloL (COREG) 12.5 MG tablet 1 tablet by mouth 2 (two) times daily.    ciprofloxacin HCl (CIPRO) 500 MG tablet Take 1 tablet (500 mg total) by mouth 2 (two) times daily.    clindamycin (CLEOCIN) 300 MG capsule Take 1 capsule (300 mg total) by mouth every 8 (eight) hours. for 7 days    ibuprofen (ADVIL,MOTRIN) 800 MG tablet Take 1 tablet (800 mg total) by mouth 3 (three) times daily as needed for Pain.    insulin (LANTUS SOLOSTAR U-100 INSULIN) glargine 100 units/mL (3mL) SubQ pen INJECT 55 UNITS SUBCUTANEOUSLY IN THE EVENING    Lactobacillus rhamnosus GG (CULTURELLE) 10 billion cell capsule Take 1 capsule by mouth once daily.    siugtbupt-X1-drT53-algal oil (METANX/FOLTANX RF) 3 mg-35 mg-2 mg -90.314 mg Cap Take one po daily    lisinopriL (PRINIVIL,ZESTRIL) 40 MG tablet Take 1 tablet (40 mg total) by mouth once daily.    metFORMIN (GLUCOPHAGE) 1000 MG tablet Take 1 tablet (1,000 mg total) by mouth 2 (two) times daily with meals.    mupirocin (BACTROBAN) 2 % ointment Apply to affected area 3 times daily    pen needle, diabetic 31 gauge x 3/16" Ndle Inject 1 each into the skin 3 (three) times daily before meals. (Patient taking differently: Inject 1 each into the skin 4 (four) times daily. )     Family History     Problem Relation (Age of Onset)    Heart disease Mother, Brother    No Known Problems Father        Tobacco Use    Smoking status: Never Smoker    Smokeless tobacco: Never Used   Substance and Sexual Activity    Alcohol use: Yes     Comment: rare x1 beer-     Drug use: No    Sexual activity: Not Currently     Review of Systems   Constitutional: Positive for " activity change and fatigue. Negative for chills and fever.   HENT: Negative for congestion and sore throat.    Eyes: Negative for visual disturbance.   Respiratory: Negative for cough and shortness of breath.    Cardiovascular: Negative for chest pain, palpitations and leg swelling.   Gastrointestinal: Negative for abdominal pain, constipation, nausea and vomiting.   Genitourinary: Negative for difficulty urinating, dysuria and hematuria.   Musculoskeletal: Negative for back pain and myalgias.   Skin: Negative for rash.   Neurological: Positive for numbness (BLLE). Negative for dizziness, weakness and headaches.   Psychiatric/Behavioral: Negative for behavioral problems.     Objective:     Vital Signs (Most Recent):  Temp: 100.1 °F (37.8 °C) (07/03/20 1938)  Pulse: 75 (07/03/20 1938)  Resp: 20 (07/03/20 1938)  BP: (!) 162/87 (07/03/20 1938)  SpO2: 98 % (07/03/20 1938) Vital Signs (24h Range):  Temp:  [98 °F (36.7 °C)-100.1 °F (37.8 °C)] 100.1 °F (37.8 °C)  Pulse:  [74-95] 75  Resp:  [16-20] 20  SpO2:  [96 %-98 %] 98 %  BP: (136-162)/(83-88) 162/87     Weight: 127 kg (280 lb)  Body mass index is 35.95 kg/m².    Physical Exam  Constitutional:       General: He is not in acute distress.  HENT:      Head: Normocephalic and atraumatic.   Cardiovascular:      Rate and Rhythm: Normal rate and regular rhythm.      Pulses: Normal pulses.      Heart sounds: Normal heart sounds. No murmur. No friction rub. No gallop.    Pulmonary:      Effort: Pulmonary effort is normal.      Breath sounds: Normal breath sounds. No wheezing, rhonchi or rales.   Abdominal:      General: Abdomen is flat. Bowel sounds are normal. There is no distension.      Palpations: Abdomen is soft.      Tenderness: There is no abdominal tenderness. There is no guarding.   Musculoskeletal: Normal range of motion.         General: No tenderness.   Feet:      Comments: Left 1st toe with open wound and serosanguineous drainage.   Skin:     General: Skin is  warm.      Findings: Erythema (on left lower extremity) present.   Neurological:      Mental Status: He is alert and oriented to person, place, and time.      Sensory: Sensory deficit (decreased sensation BLLE) present.   Psychiatric:         Mood and Affect: Mood normal.             Significant Labs:   Recent Lab Results       07/03/20  1933   07/03/20  1535   07/03/20  1530        Albumin     2.7     Alkaline Phosphatase     130     ALT     18     Anion Gap     11     AST     16     Baso #     0.03     Basophil%     0.2     BILIRUBIN TOTAL     0.8  Comment:  For infants and newborns, interpretation of results should be based  on gestational age, weight and in agreement with clinical  observations.  Premature Infant recommended reference ranges:  Up to 24 hours.............<8.0 mg/dL  Up to 48 hours............<12.0 mg/dL  3-5 days..................<15.0 mg/dL  6-29 days.................<15.0 mg/dL       BUN, Bld     17     Calcium     8.9     Chloride     97     CO2     23     Creatinine     1.2     Differential Method     Automated     eGFR if      >60.0     eGFR if non      >60.0  Comment:  Calculation used to obtain the estimated glomerular filtration  rate (eGFR) is the CKD-EPI equation.        Eos #     0.0     Eosinophil%     0.2     Glucose     407     Gran # (ANC)     10.0     Gran%     77.7     Hematocrit     41.0     Hemoglobin     13.6     Immature Grans (Abs)     0.13  Comment:  Mild elevation in immature granulocytes is non specific and   can be seen in a variety of conditions including stress response,   acute inflammation, trauma and pregnancy. Correlation with other   laboratory and clinical findings is essential.       Immature Granulocytes     1.0     Lactate, Nando     1.2  Comment:  Falsely low lactic acid results can be found in samples   containing >=13.0 mg/dL total bilirubin and/or >=3.5 mg/dL   direct bilirubin.       Lymph #     1.2     Lymph%     9.6     MCH      28.6     MCHC     33.2     MCV     86     Mono #     1.5     Mono%     11.3     MPV     9.7     nRBC     0     Platelets     284     POCT Glucose 317         Potassium     4.6     PROTEIN TOTAL     7.5     RBC     4.75     RDW     12.3     SARS-CoV-2 RNA, Amplification, Qual   Negative  Comment:  This test utilizes isothermal nucleic acid amplification   technology to detect the SARS-CoV-2 RdRp nucleic acid segment.   The analytical sensitivity (limit of detection) is 125 genome   equivalents/mL.   A POSITIVE result implies infection with the SARS-CoV-2 virus;  the patient is presumed to be contagious.    A NEGATIVE result means that SARS-CoV-2 nucleic acids are not  present above the limit of detection. A NEGATIVE result should be   treated as presumptive. It does not rule out the possibility of   COVID-19 and should not be the sole basis for treatment decisions.   If COVID-19 is strongly suspected based on clinical and exposure   history, re-testing using an alternate molecular assay should be   considered.   This test is only for use under the Food and Drug   Administration s Emergency Use Authorization (EUA).   Commercial kits are provided by [employer].   Performance characteristics of the EUA have been independently  verified by [facility] Department of  Pathology and Laboratory Medicine.   _________________________________________________________________  The ID NOW COVID-19 Letter of Authorization, along with the   authorized Fact Sheet for Healthcare Providers, the authorized Fact  Sheet for Patients, and authorized labeling are available on the FDA   website:  www.fda.gov/MedicalDevices/Safety/EmergencySituations/yhy299160.htm         Sodium     131     WBC     12.93           Significant Imaging: I have reviewed all pertinent imaging results/findings within the past 24 hours.   Average

## 2022-03-05 NOTE — PROGRESS NOTES
Subjective:      Patient ID: Billy Rivera is a 56 y.o. male.    Chief Complaint: Wound Check (Pcp-Samuel 1 year ago)     Chronic wound left and right foot.  Changing dressing daily at home every other day.  Ambulating in surgical shoe left  casual shoe right. No new complaints today.  He has been alternating Iodosorb and Medihoney.  No new issues.  Right foot continues to improve.      Hemoglobin A1C   Date Value Ref Range Status   01/26/2022 12.2 (H) 4.0 - 5.6 % Final     Comment:     ADA Screening Guidelines:  5.7-6.4%  Consistent with prediabetes  >or=6.5%  Consistent with diabetes    High levels of fetal hemoglobin interfere with the HbA1C  assay. Heterozygous hemoglobin variants (HbS, HgC, etc)do  not significantly interfere with this assay.   However, presence of multiple variants may affect accuracy.     12/22/2020 10.5 (H) 4.0 - 5.6 % Final     Comment:     ADA Screening Guidelines:  5.7-6.4%  Consistent with prediabetes  >or=6.5%  Consistent with diabetes  High levels of fetal hemoglobin interfere with the HbA1C  assay. Heterozygous hemoglobin variants (HbS, HgC, etc)do  not significantly interfere with this assay.   However, presence of multiple variants may affect accuracy.     10/13/2020 10.8 (H) 4.0 - 5.6 % Final     Comment:     ADA Screening Guidelines:  5.7-6.4%  Consistent with prediabetes  >or=6.5%  Consistent with diabetes  High levels of fetal hemoglobin interfere with the HbA1C  assay. Heterozygous hemoglobin variants (HbS, HgC, etc)do  not significantly interfere with this assay.   However, presence of multiple variants may affect accuracy.             Patient Active Problem List   Diagnosis    Coronary artery disease involving native coronary artery of native heart without angina pectoris    BDR (background diabetic retinopathy) - Both Eyes    Uncontrolled type II diabetes mellitus    PVD (peripheral vascular disease)    Sleep apnea    Proteinuria    Edema    Hypertension  associated with diabetes    Type 2 diabetes mellitus with diabetic peripheral angiopathy without gangrene, with long-term current use of insulin    Onychomycosis of multiple toenails with type 2 diabetes mellitus    Type 2 diabetes mellitus 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, with long-term current use of insulin    Hyponatremia    Anemia    Hypomagnesemia    Diabetic foot ulcer associated with type 2 diabetes mellitus    Insomnia    Hx of colon cancer, stage I    Dyslipidemia associated with type 2 diabetes mellitus    Diabetes mellitus with insulin therapy    Obesity, diabetes, and hypertension syndrome    Class 2 severe obesity with body mass index (BMI) of 35 to 39.9 with serious comorbidity    Diabetes mellitus with microalbuminuria    Diabetes mellitus with peripheral circulatory disorder    Diabetic foot infection    Coronary artery disease involving coronary bypass graft of native heart without angina pectoris    Osteomyelitis of left foot    Amputation of toe of left foot    Insulin pump in place       Current Outpatient Medications on File Prior to Visit   Medication Sig Dispense Refill    acetaminophen (TYLENOL) 325 MG tablet Take 325 mg by mouth every 6 (six) hours as needed for Pain.      aspirin (ECOTRIN) 81 MG EC tablet Take 1 tablet (81 mg total) by mouth once daily.  0    aspirin/salicylamide/caffeine (BC HEADACHE POWDER ORAL) Take 1 Package by mouth daily as needed (pain).      blood sugar diagnostic (ACCU-CHEK GUIDE TEST STRIPS) Strp 1 each by Misc.(Non-Drug; Combo Route) route 5 (five) times daily. 150 each 11    clotrimazole-betamethasone 1-0.05% (LOTRISONE) cream Apply topically 2 (two) times daily. 45 g 2    ibuprofen (ADVIL,MOTRIN) 200 MG tablet Take 200 mg by mouth every 6 (six) hours as needed for Pain.      insulin (LANTUS SOLOSTAR U-100 INSULIN) glargine 100 units/mL (3mL) SubQ pen Inject 60 Units into the skin  "once daily. 15 mL 0    lancets (ACCU-CHEK FASTCLIX LANCET DRUM) Misc 1 each by Misc.(Non-Drug; Combo Route) route Daily. 30 each 11    lisinopriL (PRINIVIL,ZESTRIL) 40 MG tablet Take 1 tablet by mouth once daily 30 tablet 0    melatonin (MELATIN) 3 mg tablet Take 2 tablets (6 mg total) by mouth nightly as needed for Insomnia.  0    metFORMIN (GLUCOPHAGE) 1000 MG tablet Take 1 tablet (1,000 mg total) by mouth 2 (two) times daily with meals. 60 tablet 11    pen needle, diabetic 31 gauge x 3/16" Ndle Inject 1 each into the skin 3 (three) times daily before meals. (Patient taking differently: Inject 1 each into the skin 4 (four) times daily.) 100 each 12    carvediloL (COREG) 12.5 MG tablet Take 1 tablet (12.5 mg total) by mouth 2 (two) times daily. 60 tablet 11    MINIMED 770G INSULIN PUMP Misc 1 each by Misc.(Non-Drug; Combo Route) route continuous. Medically necessary for management of Type 2 diabetes, E11.65 1 each 0     No current facility-administered medications on file prior to visit.       Review of patient's allergies indicates:   Allergen Reactions    Penicillins Other (See Comments)     PCN allergy as a child - was told he went into a coma. Tolerates Cefazolin without adverse reactions    Penicillin     Shellfish containing products      Other reaction(s): Unknown    Vancomycin Itching     Tolerated vancomycin 7/2020    Bactrim  [sulfamethoxazole-trimethoprim] Rash       Past Surgical History:   Procedure Laterality Date    COLONOSCOPY N/A 2/17/2017    Procedure: COLONOSCOPY;  Surgeon: Simon Gomez MD;  Location: [facility] (4TH FLR);  Service: Endoscopy;  Laterality: N/A;    CORONARY ANGIOPLASTY      INCISION AND DRAINAGE FOOT Right 6/5/2018    Procedure: INCISION AND DRAINAGE, FOOT;  Surgeon: Bridget Santana DPM;  [facility];  Service: Podiatry;  Laterality: Right;  Request mini C arm in room. request wound vac with medium black sponge    INCISION AND DRAINAGE FOOT Right " 6/7/2018    Procedure: INCISION AND DRAINAGE, FOOT;  Surgeon: Emelia Brock DPM;  Location: [facility] OR 2ND FLR;  Service: Podiatry;  Laterality: Right;    INCISION AND DRAINAGE FOOT Left 9/4/2020    Procedure: INCISION AND DRAINAGE, FOOT;  Surgeon: Ainsley Powell DPM;  Location: [facility] OR 2ND FLR;  Service: Podiatry;  Laterality: Left;    INCISION AND DRAINAGE FOOT Left 12/22/2020    Procedure: INCISION AND DRAINAGE, FOOT;  Surgeon: Ainsley Powell DPM;  Location: [facility] OR 2ND FLR;  Service: Podiatry;  Laterality: Left;  May need micro choice  Need Jam shidi needles  Stretcher OK      INCISION AND DRAINAGE OF ABSCESS Right 6/2/2018    Procedure: INCISION AND DRAINAGE, ABSCESS;  Surgeon: Bridget Santana DPM;  [facility];  Service: General;  Laterality: Right;    OSTEOTOMY OF METATARSAL BONE  9/4/2020    Procedure: OSTEOTOMY, METATARSAL BONE, 1st METATARSAL RESECTION;  Surgeon: Ainsley Powell DPM;  Location: [facility] OR [facility] FLR;  Service: Podiatry;;    REMOVAL OF FOREIGN BODY FROM FOOT Right 6/7/2018    Procedure: REMOVAL, FOREIGN BODY, FOOT;  Surgeon: Emelia Brock DPM;  [facility];  Service: Podiatry;  Laterality: Right;    TOE AMPUTATION Left 7/4/2020    Procedure: AMPUTATION, TOE;  Surgeon: Bridget Santana DPM;  [facility];  Service: Podiatry;  Laterality: Left;    TOE AMPUTATION Left 10/14/2020    Procedure: AMPUTATION, TOE;  Surgeon: Mingo Melara DPM;  Location: [facility] OR 2ND FLR;  Service: Podiatry;  Laterality: Left;  stretcher OK    TONSILLECTOMY         Family History   Problem Relation Age of Onset    Heart disease Mother     Diabetes Mother     Diabetes Father     Heart disease Brother     Diabetes Maternal Grandmother     Diabetes Maternal Grandfather     Diabetes Paternal Grandmother     Diabetes Paternal Grandfather     Anesthesia problems Neg Hx        Social History     Socioeconomic History    Marital status:    Tobacco Use     "Smoking status: Never Smoker    Smokeless tobacco: Never Used   Substance and Sexual Activity    Alcohol use: Yes     Comment: rare x1 beer-     Drug use: No    Sexual activity: Not Currently           Review of Systems   Constitutional: Negative for chills, fever, malaise/fatigue and night sweats.   Cardiovascular: Negative for claudication, cyanosis and leg swelling.   Skin: Positive for poor wound healing. Negative for color change, itching, rash and suspicious lesions.   Musculoskeletal: Negative for falls, gout, joint pain and myalgias.   Neurological: Positive for numbness and sensory change.           Objective:       Vitals:    02/23/22 0824   BP: (!) 145/86   Pulse: 79   Weight: 124.9 kg (275 lb 5.7 oz)   Height: 6' 2" (1.88 m)   PainSc: 0-No pain       Physical Exam  Constitutional:       General: He is not in acute distress.     Appearance: He is well-developed. He is not diaphoretic.   Cardiovascular:      Pulses:           Dorsalis pedis pulses are 1+ on the right side and 1+ on the left side.        Posterior tibial pulses are 1+ on the right side and 1+ on the left side.      Comments: Toes warm, pink, cap fill <5 seconds.  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     Skin:     General: Skin is warm and dry.      Coloration: Skin is not pale.      Findings: see wound description below    Neurological:      Comments: Diminished/loss of protective sensation all toes bilateral to 10 gram monofilament.     Psychiatric:         Behavior: Behavior is cooperative.         Ulcer location: plantar partial 1st ray amputation stump, left  Measurements : 1.5x0.2x0.1cm , unchanged since last visit  Signs of infection:local edema, malodor  Drainage: Sero-Sanguinous  Purulence: no  Crepitus/fluctuance: no  Periwound: Reddened, Macerated, " "Calloused  Base: Mixed Granular/Fibrotic  Depth: subcutaneous tissue  Probe to bone: no                  Assessment:       Encounter Diagnoses   Name Primary?    Ulcer of right foot with fat layer exposed Yes    Diabetes mellitus with peripheral circulatory disorder          Plan:       Billy was seen today for wound check.    Diagnoses and all orders for this visit:    Ulcer of right foot with fat layer exposed    Diabetes mellitus with peripheral circulatory disorder        Education about the prevention of limb loss.    Discussed wound healing cycle, skin integrity, ways to care for skin.Counseled patient on the effects of high blood glucose on healing. He verbalizes understanding that it can increase the chances of delayed healing and this prolonged exposure leads to infection or progression of infection which subsequently can result in loss of limb.    Adequate vitamin supplementation, protein intake, and hydration - discussed with patient    Wound Debridement    Performed by: Mingo Melara DPM   Authorized by: Patient    Time out: Immediately prior to procedure a "time out" was called to verify the correct patient, procedure, equipment, support staff and site/side marked as required.   Consent Done?:  Yes (Verbal)  Local anesthesia used?: No       Wound Details:    Location:   left foot     Type of Debridement:  Excisional       Length (cm):   1.5       Width (cm):   0.2       Depth (cm):   0.1       Percent Debrided (%):  100             Depth of debridement:  Subcutaneous tissue    Tissue debrided:  Dermis, Epidermis and Subcutaneous    Devitalized tissue debrided:  Biofilm, Callus and Necrotic/Eschar    Instruments:  Blade, Curette and Nippers     Bleeding:  Minimal  Hemostasis Achieved: Yes    Method Used:  Pressure  Patient tolerance:  Patient tolerated the procedure well with no immediate complications    The nature of the condition, options for management, as well as potential risks and " complications were discussed in detail with patient. Patient was amenable to my recommendations and left my office fully informed and will follow up as instructed or sooner if necessary.      Wound care discussed in detail.  Begin Iodosorb and Leticia dressing every other day with dry sterile dressing left foot.  Betadine right foot to stable callus.  Regular shoe gear on the right, Darco on the left.  Follow-up in 2 weeks.  Will re-evaluate at that time.

## 2022-03-14 ENCOUNTER — TELEPHONE (OUTPATIENT)
Dept: PODIATRY | Facility: CLINIC | Age: 57
End: 2022-03-14
Payer: COMMERCIAL

## 2022-03-14 NOTE — TELEPHONE ENCOUNTER
Pt showed up in clinic today however, Dr. Melara was not in the office. Pt declined appointment on 3/15 and accepted an appointment with Dr. Steel on 3/16.

## 2022-03-16 ENCOUNTER — OFFICE VISIT (OUTPATIENT)
Dept: PODIATRY | Facility: CLINIC | Age: 57
End: 2022-03-16
Payer: COMMERCIAL

## 2022-03-16 VITALS
DIASTOLIC BLOOD PRESSURE: 71 MMHG | BODY MASS INDEX: 35.35 KG/M2 | WEIGHT: 275.38 LBS | SYSTOLIC BLOOD PRESSURE: 116 MMHG | HEART RATE: 54 BPM

## 2022-03-16 DIAGNOSIS — S98.132A AMPUTATION OF TOE OF LEFT FOOT: ICD-10-CM

## 2022-03-16 DIAGNOSIS — E11.49 TYPE II DIABETES MELLITUS WITH NEUROLOGICAL MANIFESTATIONS: Primary | ICD-10-CM

## 2022-03-16 DIAGNOSIS — L97.513 ULCER OF RIGHT FOOT WITH NECROSIS OF MUSCLE: ICD-10-CM

## 2022-03-16 DIAGNOSIS — L97.522 ULCER OF LEFT FOOT, WITH FAT LAYER EXPOSED: ICD-10-CM

## 2022-03-16 DIAGNOSIS — E11.628 DIABETIC FOOT INFECTION: ICD-10-CM

## 2022-03-16 DIAGNOSIS — L08.9 DIABETIC FOOT INFECTION: ICD-10-CM

## 2022-03-16 PROCEDURE — 3078F DIAST BP <80 MM HG: CPT | Mod: CPTII,S$GLB,, | Performed by: PODIATRIST

## 2022-03-16 PROCEDURE — 87076 CULTURE ANAEROBE IDENT EACH: CPT | Performed by: PODIATRIST

## 2022-03-16 PROCEDURE — 3046F PR MOST RECENT HEMOGLOBIN A1C LEVEL > 9.0%: ICD-10-PCS | Mod: CPTII,S$GLB,, | Performed by: PODIATRIST

## 2022-03-16 PROCEDURE — 97597 DBRDMT OPN WND 1ST 20 CM/<: CPT | Mod: 59,S$GLB,, | Performed by: PODIATRIST

## 2022-03-16 PROCEDURE — 87186 SC STD MICRODIL/AGAR DIL: CPT | Performed by: PODIATRIST

## 2022-03-16 PROCEDURE — 99999 PR PBB SHADOW E&M-EST. PATIENT-LVL IV: ICD-10-PCS | Mod: PBBFAC,,, | Performed by: PODIATRIST

## 2022-03-16 PROCEDURE — 3062F POS MACROALBUMINURIA REV: CPT | Mod: CPTII,S$GLB,, | Performed by: PODIATRIST

## 2022-03-16 PROCEDURE — 87070 CULTURE OTHR SPECIMN AEROBIC: CPT | Performed by: PODIATRIST

## 2022-03-16 PROCEDURE — 4010F PR ACE/ARB THEARPY RXD/TAKEN: ICD-10-PCS | Mod: CPTII,S$GLB,, | Performed by: PODIATRIST

## 2022-03-16 PROCEDURE — 3066F NEPHROPATHY DOC TX: CPT | Mod: CPTII,S$GLB,, | Performed by: PODIATRIST

## 2022-03-16 PROCEDURE — 1159F MED LIST DOCD IN RCRD: CPT | Mod: CPTII,S$GLB,, | Performed by: PODIATRIST

## 2022-03-16 PROCEDURE — 87075 CULTR BACTERIA EXCEPT BLOOD: CPT | Performed by: PODIATRIST

## 2022-03-16 PROCEDURE — 3074F PR MOST RECENT SYSTOLIC BLOOD PRESSURE < 130 MM HG: ICD-10-PCS | Mod: CPTII,S$GLB,, | Performed by: PODIATRIST

## 2022-03-16 PROCEDURE — 3062F PR POS MACROALBUMINURIA RESULT DOCUMENTED/REVIEW: ICD-10-PCS | Mod: CPTII,S$GLB,, | Performed by: PODIATRIST

## 2022-03-16 PROCEDURE — 11043 DBRDMT MUSC&/FSCA 1ST 20/<: CPT | Mod: S$GLB,,, | Performed by: PODIATRIST

## 2022-03-16 PROCEDURE — 11043 PR DEBRIDEMENT, SKIN, SUB-Q TISSUE,MUSCLE,=<20 SQ CM: ICD-10-PCS | Mod: S$GLB,,, | Performed by: PODIATRIST

## 2022-03-16 PROCEDURE — 99214 PR OFFICE/OUTPT VISIT, EST, LEVL IV, 30-39 MIN: ICD-10-PCS | Mod: 25,S$GLB,, | Performed by: PODIATRIST

## 2022-03-16 PROCEDURE — 3008F BODY MASS INDEX DOCD: CPT | Mod: CPTII,S$GLB,, | Performed by: PODIATRIST

## 2022-03-16 PROCEDURE — 87077 CULTURE AEROBIC IDENTIFY: CPT | Performed by: PODIATRIST

## 2022-03-16 PROCEDURE — 1159F PR MEDICATION LIST DOCUMENTED IN MEDICAL RECORD: ICD-10-PCS | Mod: CPTII,S$GLB,, | Performed by: PODIATRIST

## 2022-03-16 PROCEDURE — 3066F PR DOCUMENTATION OF TREATMENT FOR NEPHROPATHY: ICD-10-PCS | Mod: CPTII,S$GLB,, | Performed by: PODIATRIST

## 2022-03-16 PROCEDURE — 97597 PR DEBRIDEMENT OPEN WOUND 20 SQ CM<: ICD-10-PCS | Mod: 59,S$GLB,, | Performed by: PODIATRIST

## 2022-03-16 PROCEDURE — 3074F SYST BP LT 130 MM HG: CPT | Mod: CPTII,S$GLB,, | Performed by: PODIATRIST

## 2022-03-16 PROCEDURE — 3078F PR MOST RECENT DIASTOLIC BLOOD PRESSURE < 80 MM HG: ICD-10-PCS | Mod: CPTII,S$GLB,, | Performed by: PODIATRIST

## 2022-03-16 PROCEDURE — 3008F PR BODY MASS INDEX (BMI) DOCUMENTED: ICD-10-PCS | Mod: CPTII,S$GLB,, | Performed by: PODIATRIST

## 2022-03-16 PROCEDURE — 4010F ACE/ARB THERAPY RXD/TAKEN: CPT | Mod: CPTII,S$GLB,, | Performed by: PODIATRIST

## 2022-03-16 PROCEDURE — 3046F HEMOGLOBIN A1C LEVEL >9.0%: CPT | Mod: CPTII,S$GLB,, | Performed by: PODIATRIST

## 2022-03-16 PROCEDURE — 99214 OFFICE O/P EST MOD 30 MIN: CPT | Mod: 25,S$GLB,, | Performed by: PODIATRIST

## 2022-03-16 PROCEDURE — 99999 PR PBB SHADOW E&M-EST. PATIENT-LVL IV: CPT | Mod: PBBFAC,,, | Performed by: PODIATRIST

## 2022-03-16 NOTE — PROGRESS NOTES
Subjective:      Patient ID: Billy Rivera is a 56 y.o. male.    Chief Complaint: Wound Check (Right foot)     Billy is a 56 y.o. male who presents to the clinic for evaluation and treatment of high risk feet. Billy has a past medical history of Allergy, CAD (coronary artery disease), native coronary artery (6/25/2013), Cancer, COVID-19 virus detected (9/1/2020), Diabetes mellitus, Diabetes mellitus, type 2, Disorder of kidney and ureter, Heart attack (04/2012), colon cancer, stage I, Hyperlipidemia, Hypertension, Muscular pain, NSTEMI May 2013 - peak troponin 0.22 (5/22/2013), MICHAELA (obstructive sleep apnea), and Retinopathy due to secondary diabetes. The patient's chief complaint   is ulcers This patient has documented high risk feet requiring routine maintenance secondary to diabetes mellitis and those secondary complications of diabetes, as mentioned..      Patient is a current patient of Dr. Melara he is new to me.  He needed to be seen this week as a fit in.  Patient presents with his wife who is extremely hard of hearing so unable to contribute to the conversation.    Patient discusses he is still working as a  because there is no options of short-term disability or long-term disability with his job.  He only gets paid when he works.  He did try Cam boot at 1 time but relates he could not walk.  It is not that he was a fall risk it was just he felt he could not walk.  He wears a regular tennis shoe on the left foot and a surgical shoe on the right.  Patient relates he has been doing his own dressing changes every other day as directed with the Medihoney in the Iodosorb.  He often tries to apply a pad to cushion the areas.    He has an appointment next week to re-evaluate his glucose.  It is been extremely high in the past which he relates that his pump needs adjusting all the time and he is working on getting it correct.    Patient discusses that Dr. Melara might consider a bone biopsy  or some other options so he will speak with him next week.  Patient denies any fever chills nausea vomiting.  Patient denies any excessive drainage.  He does discussed about 5 years ago he had a foreign body in his right foot and it was a bunch of glass after an incident where he stepped on something.  He had major foot surgery.  He relates since then he has had a few amputations both sides.    Patient relates that he has come to the if conclusion that he will eventually need a below-the-knee amputation because diabetes runs in both sides of his family.  Patient denies smoking  Patient relates he does have and a lot of allergies to certain medication however Infectious Disease was able to find 1 previously that works.  Patient relates he is not currently on any antibiotics.  Patient has no other complaints    PCP: BRAD Baker MD    Date Last Seen by PCP: 2/23/22 MAHENDRA Powell    Current shoe gear:  Affected Foot: Football and Darco shoe on the affected foot     Unaffected Foot: Tennis shoes    Hemoglobin A1C   Date Value Ref Range Status   01/26/2022 12.2 (H) 4.0 - 5.6 % Final     Comment:     ADA Screening Guidelines:  5.7-6.4%  Consistent with prediabetes  >or=6.5%  Consistent with diabetes    High levels of fetal hemoglobin interfere with the HbA1C  assay. Heterozygous hemoglobin variants (HbS, HgC, etc)do  not significantly interfere with this assay.   However, presence of multiple variants may affect accuracy.     12/22/2020 10.5 (H) 4.0 - 5.6 % Final     Comment:     ADA Screening Guidelines:  5.7-6.4%  Consistent with prediabetes  >or=6.5%  Consistent with diabetes  High levels of fetal hemoglobin interfere with the HbA1C  assay. Heterozygous hemoglobin variants (HbS, HgC, etc)do  not significantly interfere with this assay.   However, presence of multiple variants may affect accuracy.     10/13/2020 10.8 (H) 4.0 - 5.6 % Final     Comment:     ADA Screening Guidelines:  5.7-6.4%  Consistent with  prediabetes  >or=6.5%  Consistent with diabetes  High levels of fetal hemoglobin interfere with the HbA1C  assay. Heterozygous hemoglobin variants (HbS, HgC, etc)do  not significantly interfere with this assay.   However, presence of multiple variants may affect accuracy.           Hemoglobin A1C   Date Value Ref Range Status   01/26/2022 12.2 (H) 4.0 - 5.6 % Final     Comment:     ADA Screening Guidelines:  5.7-6.4%  Consistent with prediabetes  >or=6.5%  Consistent with diabetes    High levels of fetal hemoglobin interfere with the HbA1C  assay. Heterozygous hemoglobin variants (HbS, HgC, etc)do  not significantly interfere with this assay.   However, presence of multiple variants may affect accuracy.     12/22/2020 10.5 (H) 4.0 - 5.6 % Final     Comment:     ADA Screening Guidelines:  5.7-6.4%  Consistent with prediabetes  >or=6.5%  Consistent with diabetes  High levels of fetal hemoglobin interfere with the HbA1C  assay. Heterozygous hemoglobin variants (HbS, HgC, etc)do  not significantly interfere with this assay.   However, presence of multiple variants may affect accuracy.     10/13/2020 10.8 (H) 4.0 - 5.6 % Final     Comment:     ADA Screening Guidelines:  5.7-6.4%  Consistent with prediabetes  >or=6.5%  Consistent with diabetes  High levels of fetal hemoglobin interfere with the HbA1C  assay. Heterozygous hemoglobin variants (HbS, HgC, etc)do  not significantly interfere with this assay.   However, presence of multiple variants may affect accuracy.             Patient Active Problem List   Diagnosis    Coronary artery disease involving native coronary artery of native heart without angina pectoris    BDR (background diabetic retinopathy) - Both Eyes    Uncontrolled type II diabetes mellitus    PVD (peripheral vascular disease)    Sleep apnea    Proteinuria    Edema    Hypertension associated with diabetes    Type 2 diabetes mellitus with diabetic peripheral angiopathy without gangrene, with  long-term current use of insulin    Onychomycosis of multiple toenails with type 2 diabetes mellitus    Type 2 diabetes mellitus 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, with long-term current use of insulin    Hyponatremia    Anemia    Hypomagnesemia    Diabetic foot ulcer associated with type 2 diabetes mellitus    Insomnia    Hx of colon cancer, stage I    Dyslipidemia associated with type 2 diabetes mellitus    Diabetes mellitus with insulin therapy    Obesity, diabetes, and hypertension syndrome    Class 2 severe obesity with body mass index (BMI) of 35 to 39.9 with serious comorbidity    Diabetes mellitus with microalbuminuria    Diabetes mellitus with peripheral circulatory disorder    Diabetic foot infection    Coronary artery disease involving coronary bypass graft of native heart without angina pectoris    Osteomyelitis of left foot    Amputation of toe of left foot    Insulin pump in place       Current Outpatient Medications on File Prior to Visit   Medication Sig Dispense Refill    acetaminophen (TYLENOL) 325 MG tablet Take 325 mg by mouth every 6 (six) hours as needed for Pain.      aspirin (ECOTRIN) 81 MG EC tablet Take 1 tablet (81 mg total) by mouth once daily.  0    aspirin/salicylamide/caffeine (BC HEADACHE POWDER ORAL) Take 1 Package by mouth daily as needed (pain).      atorvastatin (LIPITOR) 80 MG tablet Take 1 tablet by mouth once daily 30 tablet 0    blood sugar diagnostic (ACCU-CHEK GUIDE TEST STRIPS) Strp 1 each by Misc.(Non-Drug; Combo Route) route 5 (five) times daily. 150 each 11    clotrimazole-betamethasone 1-0.05% (LOTRISONE) cream Apply topically 2 (two) times daily. 45 g 2    dulaglutide (TRULICITY) 0.75 mg/0.5 mL pen injector Inject 0.75 mg into the skin once a week. 4 pen 6    flash glucose sensor (FREESTYLE MICHAEL 2 SENSOR) Kit 1 each by Misc.(Non-Drug; Combo Route) route every 14 (fourteen) days. 6 kit 3  "   ibuprofen (ADVIL,MOTRIN) 200 MG tablet Take 200 mg by mouth every 6 (six) hours as needed for Pain.      insulin (LANTUS SOLOSTAR U-100 INSULIN) glargine 100 units/mL (3mL) SubQ pen Inject 60 Units into the skin once daily. 15 mL 0    insulin lispro (HUMALOG U-100 INSULIN) 100 unit/mL injection Inject 100 Units into the skin continuous. Gives via insulin pump only. Do not Directly inject. 50 mL 11    lancets (ACCU-CHEK FASTCLIX LANCET DRUM) Misc 1 each by Misc.(Non-Drug; Combo Route) route Daily. 30 each 11    lisinopriL (PRINIVIL,ZESTRIL) 40 MG tablet Take 1 tablet by mouth once daily 30 tablet 0    melatonin (MELATIN) 3 mg tablet Take 2 tablets (6 mg total) by mouth nightly as needed for Insomnia.  0    metFORMIN (GLUCOPHAGE) 1000 MG tablet Take 1 tablet (1,000 mg total) by mouth 2 (two) times daily with meals. 60 tablet 11    pen needle, diabetic 31 gauge x 3/16" Ndle Inject 1 each into the skin 3 (three) times daily before meals. (Patient taking differently: Inject 1 each into the skin 4 (four) times daily.) 100 each 12    carvediloL (COREG) 12.5 MG tablet Take 1 tablet (12.5 mg total) by mouth 2 (two) times daily. 60 tablet 11    MINIMED 770G INSULIN PUMP Misc 1 each by Misc.(Non-Drug; Combo Route) route continuous. Medically necessary for management of Type 2 diabetes, E11.65 1 each 0     No current facility-administered medications on file prior to visit.       Review of patient's allergies indicates:   Allergen Reactions    Penicillins Other (See Comments)     PCN allergy as a child - was told he went into a coma. Tolerates Cefazolin without adverse reactions    Penicillin     Shellfish containing products      Other reaction(s): Unknown    Vancomycin Itching     Tolerated vancomycin 7/2020    Bactrim  [sulfamethoxazole-trimethoprim] Rash       Past Surgical History:   Procedure Laterality Date    COLONOSCOPY N/A 2/17/2017    Procedure: COLONOSCOPY;  Surgeon: Simon Gomez MD;  Location: " [facility] (4TH FLR);  Service: Endoscopy;  Laterality: N/A;    CORONARY ANGIOPLASTY      INCISION AND DRAINAGE FOOT Right 6/5/2018    Procedure: INCISION AND DRAINAGE, FOOT;  Surgeon: Bridget Santana DPM;  Location: [facility] OR 1ST FLR;  Service: Podiatry;  Laterality: Right;  Request mini C arm in room. request wound vac with medium black sponge    INCISION AND DRAINAGE FOOT Right 6/7/2018    Procedure: INCISION AND DRAINAGE, FOOT;  Surgeon: Emelia Brock DPM;  Location: [facility] OR 2ND FLR;  Service: Podiatry;  Laterality: Right;    INCISION AND DRAINAGE FOOT Left 9/4/2020    Procedure: INCISION AND DRAINAGE, FOOT;  Surgeon: Ainsley Powell DPM;  Location: [facility] OR 2ND FLR;  Service: Podiatry;  Laterality: Left;    INCISION AND DRAINAGE FOOT Left 12/22/2020    Procedure: INCISION AND DRAINAGE, FOOT;  Surgeon: Ainsley Powell DPM;  Location: [facility] OR 2ND FLR;  Service: Podiatry;  Laterality: Left;  May need micro choice  Need Jam shidi needles  Stretcher OK      INCISION AND DRAINAGE OF ABSCESS Right 6/2/2018    Procedure: INCISION AND DRAINAGE, ABSCESS;  Surgeon: Bridget Santana DPM;  Location: [facility] OR 2ND FLR;  Service: General;  Laterality: Right;    OSTEOTOMY OF METATARSAL BONE  9/4/2020    Procedure: OSTEOTOMY, METATARSAL BONE, 1st METATARSAL RESECTION;  Surgeon: Ainsley Powell DPM;  Location: [facility] OR 2ND FLR;  Service: Podiatry;;    REMOVAL OF FOREIGN BODY FROM FOOT Right 6/7/2018    Procedure: REMOVAL, FOREIGN BODY, FOOT;  Surgeon: Emelia Brock DPM;  Location: [facility] OR 2ND FLR;  Service: Podiatry;  Laterality: Right;    TOE AMPUTATION Left 7/4/2020    Procedure: AMPUTATION, TOE;  Surgeon: Bridget Santana DPM;  Location: [facility] OR 2ND FLR;  Service: Podiatry;  Laterality: Left;    TOE AMPUTATION Left 10/14/2020    Procedure: AMPUTATION, TOE;  Surgeon: Mingo Melara DPM;  Location: [facility] OR 2ND FLR;  Service: Podiatry;  Laterality: Left;  stretcher OK    TONSILLECTOMY         Family History    Problem Relation Age of Onset    Heart disease Mother     Diabetes Mother     Diabetes Father     Heart disease Brother     Diabetes Maternal Grandmother     Diabetes Maternal Grandfather     Diabetes Paternal Grandmother     Diabetes Paternal Grandfather     Anesthesia problems Neg Hx        Social History     Socioeconomic History    Marital status:    Tobacco Use    Smoking status: Never Smoker    Smokeless tobacco: Never Used   Substance and Sexual Activity    Alcohol use: Yes     Comment: rare x1 beer-     Drug use: No    Sexual activity: Not Currently           Review of Systems   Constitutional: Negative for chills, fever, malaise/fatigue and night sweats.   Cardiovascular: Negative for claudication, cyanosis and leg swelling.   Skin: Positive for poor wound healing. Negative for color change, itching, rash and suspicious lesions.   Musculoskeletal: Negative for falls, gout, joint pain and myalgias.   Neurological: Positive for numbness and sensory change.           Objective:       Vitals:    03/16/22 1043   BP: 116/71   Pulse: (!) 54   Weight: 124.9 kg (275 lb 5.7 oz)   PainSc: 0-No pain       Physical Exam  Constitutional:       General: He is not in acute distress.     Appearance: He is well-developed. He is not diaphoretic.   Cardiovascular:      Pulses:           Dorsalis pedis pulses are 1+ on the right side and 1+ on the left side.        Posterior tibial pulses are 1+ on the right side and 1+ on the left side.      Comments: Toes warm, pink, cap fill <5 seconds.  Musculoskeletal:      Comments: Partial 1st ray amputation left foot with open wound distal plantar stump        Skin:     General: Skin is warm and dry.      Coloration: Skin is not pale.      Findings: see wound description below    Neurological:      Comments:  Absent sensation   Psychiatric:         Behavior: Behavior is cooperative.  Memory intact        Ulcer location: plantar partial 1st ray amputation stump,  left  After debridement post debridement Measurements :0.5cm x 0.4cm0.1cm   Signs of infection:  No  Drainage:  None  Purulence: no  Crepitus/fluctuance: no  Periwound  Calloused  Base: Mixed Granular/Fibrotic  Depth: subcutaneous tissue  Probe to bone: no    Ulcer location:  Right forefoot sub 2nd and 3rd metatarsal heads; some foreign body such as dog hair in the wound  Pre debridement Measurements:1.0x 0.8cm x unknown cm   Post debridement Measurements :1.5cm x 2.0 cm x 1.0cm   Signs of infection: Yes malodor  Erythema: No  Undermining: Yes  Tunneling: Yes  Drainage: Bloody  Periwound skin: macerated and hyperkeratotic  Wound Base: granular/fibrotic Mild necrosis   No probe to bone; tendon exposed and some excised and sent for culture    Pre debridement right    Post debridement right    Left pre debridement        Left post debridement                Assessment:       Encounter Diagnoses   Name Primary?    Type II diabetes mellitus with neurological manifestations Yes    Ulcer of left foot, with fat layer exposed     Ulcer of right foot with necrosis of muscle     Amputation of toe of left foot     Diabetic foot infection          Plan:       Billy was seen today for wound check.    Diagnoses and all orders for this visit:    Type II diabetes mellitus with neurological manifestations  -     X-Ray Foot Complete Bilateral; Future    Ulcer of left foot, with fat layer exposed  -     X-Ray Foot Complete Bilateral; Future    Ulcer of right foot with necrosis of muscle  -     X-Ray Foot Complete Bilateral; Future    Amputation of toe of left foot  -     X-Ray Foot Complete Bilateral; Future    Diabetic foot infection  -     X-Ray Foot Complete Bilateral; Future        - recommend referral to vascular; previous martinez show decrease. Pt will d/w Dr. Melara next week. Pt did have bleeding upon debridment      - recommend xrays. Pt may need MRI right foot. Hx left MRI.     - recommend total contact cast vs. CAM boot.  Pt relates he tried a cam boot bt could not walk. [facility] pt would not be allowed to drive/work... sitting/standing .  No options of short term leave for work.  Patient will have to continue to offload with surgical shoe.  Discussed with patient goal of padding is to offload and not add additional pressure educated on applying offloading around the ulcers knot on the ulcers.      - Debridement: With verbal consent, nonviable tissues on the rightl foot were debrided beyond tendon/muscle utilizing a  sterile No.15scalpel, tissue Nipper and forceps. Minimal bleeding controlled with direct pressure  The patient tolerated this well.     Dressings: Aqu[employer]  Offloading:Foam    Follow-up:Patient is to return to the clinic in 1 week  for follow-up but should call [facility] immediately if any signs of infection, such as fever, chills, sweats, increased redness or pain.    Short-term goals include maintaining good offloading and minimizing bioburden, promoting granulation and epithelialization to healing.  Long-term goals include keeping the wound healed by good offloading and medical management under the direction of internist.    - Debridement: With verbal consent, nonviable tissues on the left foot were debrided to subq utilizing a  sterile No. 3 scalpel and forceps. Minimal bleeding controlled with direct pressure  The patient tolerated this well.     Dressings: Betadine  Offloading:Foam    Follow-up:Patient is to return to the clinic in 1 week  for follow-up but should call [facility] immediately if any signs of infection, such as fever, chills, sweats, increased redness or pain.    Short-term goals include maintaining good offloading and minimizing bioburden, promoting granulation and epithelialization to healing.  Long-term goals include keeping the wound healed by good offloading and medical management under the direction of internist.    - uncontrolled DM. Going to DM management Wednesday.    - long  discussion with patient about changing thinking of that he is destined for a below-the-knee amputation.  Discussed with patient if can get A1c under control there is multiple medical wound care modalities and offloading techniques that can prevent that.  Patient is a nonsmoker.  Discussed with patient however implement is prevent thing offloading can consider possible other job opportunities; patient is extreme high risk for further amputation    - recommend bone biosy vs MRI    - 1 week Dr. Melara    > 45 min spent with pt care

## 2022-03-18 LAB — BACTERIA SPEC AEROBE CULT: ABNORMAL

## 2022-03-18 RX ORDER — CIPROFLOXACIN 500 MG/1
500 TABLET ORAL EVERY 12 HOURS
Qty: 20 TABLET | Refills: 0 | Status: SHIPPED | OUTPATIENT
Start: 2022-03-18 | End: 2022-03-28

## 2022-03-21 LAB
BACTERIA SPEC ANAEROBE CULT: ABNORMAL
BACTERIA SPEC ANAEROBE CULT: ABNORMAL

## 2022-03-22 ENCOUNTER — TELEPHONE (OUTPATIENT)
Dept: ADMINISTRATIVE | Facility: HOSPITAL | Age: 57
End: 2022-03-22
Payer: COMMERCIAL

## 2022-03-23 ENCOUNTER — OFFICE VISIT (OUTPATIENT)
Dept: INTERNAL MEDICINE | Facility: CLINIC | Age: 57
End: 2022-03-23
Payer: COMMERCIAL

## 2022-03-23 VITALS
RESPIRATION RATE: 13 BRPM | TEMPERATURE: 98 F | SYSTOLIC BLOOD PRESSURE: 126 MMHG | WEIGHT: 282.88 LBS | DIASTOLIC BLOOD PRESSURE: 80 MMHG | HEART RATE: 67 BPM | BODY MASS INDEX: 36.3 KG/M2 | OXYGEN SATURATION: 98 % | HEIGHT: 74 IN

## 2022-03-23 DIAGNOSIS — Z79.4 TYPE 2 DIABETES MELLITUS WITH DIABETIC PERIPHERAL ANGIOPATHY WITHOUT GANGRENE, WITH LONG-TERM CURRENT USE OF INSULIN: ICD-10-CM

## 2022-03-23 DIAGNOSIS — S98.132A AMPUTATION OF TOE OF LEFT FOOT: ICD-10-CM

## 2022-03-23 DIAGNOSIS — L97.403 DIABETIC ULCER OF HEEL ASSOCIATED WITH TYPE 2 DIABETES MELLITUS, WITH NECROSIS OF MUSCLE, UNSPECIFIED LATERALITY: ICD-10-CM

## 2022-03-23 DIAGNOSIS — E11.3393 TYPE 2 DIABETES MELLITUS WITH BOTH EYES AFFECTED BY MODERATE NONPROLIFERATIVE RETINOPATHY WITHOUT MACULAR EDEMA, WITH LONG-TERM CURRENT USE OF INSULIN: Primary | ICD-10-CM

## 2022-03-23 DIAGNOSIS — E11.621 DIABETIC ULCER OF HEEL ASSOCIATED WITH TYPE 2 DIABETES MELLITUS, WITH NECROSIS OF MUSCLE, UNSPECIFIED LATERALITY: ICD-10-CM

## 2022-03-23 DIAGNOSIS — E11.69 DYSLIPIDEMIA ASSOCIATED WITH TYPE 2 DIABETES MELLITUS: ICD-10-CM

## 2022-03-23 DIAGNOSIS — Z79.4 TYPE 2 DIABETES MELLITUS WITH HYPERGLYCEMIA, WITH LONG-TERM CURRENT USE OF INSULIN: ICD-10-CM

## 2022-03-23 DIAGNOSIS — E11.65 TYPE 2 DIABETES MELLITUS WITH HYPERGLYCEMIA, WITH LONG-TERM CURRENT USE OF INSULIN: ICD-10-CM

## 2022-03-23 DIAGNOSIS — E11.51 TYPE 2 DIABETES MELLITUS WITH DIABETIC PERIPHERAL ANGIOPATHY WITHOUT GANGRENE, WITH LONG-TERM CURRENT USE OF INSULIN: ICD-10-CM

## 2022-03-23 DIAGNOSIS — Z96.41 INSULIN PUMP IN PLACE: ICD-10-CM

## 2022-03-23 DIAGNOSIS — E78.5 DYSLIPIDEMIA ASSOCIATED WITH TYPE 2 DIABETES MELLITUS: ICD-10-CM

## 2022-03-23 DIAGNOSIS — Z79.4 TYPE 2 DIABETES MELLITUS WITH BOTH EYES AFFECTED BY MODERATE NONPROLIFERATIVE RETINOPATHY WITHOUT MACULAR EDEMA, WITH LONG-TERM CURRENT USE OF INSULIN: Primary | ICD-10-CM

## 2022-03-23 PROCEDURE — 99999 PR PBB SHADOW E&M-EST. PATIENT-LVL IV: CPT | Mod: PBBFAC,,, | Performed by: NURSE PRACTITIONER

## 2022-03-23 PROCEDURE — 3079F DIAST BP 80-89 MM HG: CPT | Mod: CPTII,S$GLB,, | Performed by: NURSE PRACTITIONER

## 2022-03-23 PROCEDURE — 1160F RVW MEDS BY RX/DR IN RCRD: CPT | Mod: CPTII,S$GLB,, | Performed by: NURSE PRACTITIONER

## 2022-03-23 PROCEDURE — 1160F PR REVIEW ALL MEDS BY PRESCRIBER/CLIN PHARMACIST DOCUMENTED: ICD-10-PCS | Mod: CPTII,S$GLB,, | Performed by: NURSE PRACTITIONER

## 2022-03-23 PROCEDURE — 3066F PR DOCUMENTATION OF TREATMENT FOR NEPHROPATHY: ICD-10-PCS | Mod: CPTII,S$GLB,, | Performed by: NURSE PRACTITIONER

## 2022-03-23 PROCEDURE — 3079F PR MOST RECENT DIASTOLIC BLOOD PRESSURE 80-89 MM HG: ICD-10-PCS | Mod: CPTII,S$GLB,, | Performed by: NURSE PRACTITIONER

## 2022-03-23 PROCEDURE — 99999 PR PBB SHADOW E&M-EST. PATIENT-LVL IV: ICD-10-PCS | Mod: PBBFAC,,, | Performed by: NURSE PRACTITIONER

## 2022-03-23 PROCEDURE — 1159F MED LIST DOCD IN RCRD: CPT | Mod: CPTII,S$GLB,, | Performed by: NURSE PRACTITIONER

## 2022-03-23 PROCEDURE — 99214 OFFICE O/P EST MOD 30 MIN: CPT | Mod: S$GLB,,, | Performed by: NURSE PRACTITIONER

## 2022-03-23 PROCEDURE — 3062F POS MACROALBUMINURIA REV: CPT | Mod: CPTII,S$GLB,, | Performed by: NURSE PRACTITIONER

## 2022-03-23 PROCEDURE — 3008F PR BODY MASS INDEX (BMI) DOCUMENTED: ICD-10-PCS | Mod: CPTII,S$GLB,, | Performed by: NURSE PRACTITIONER

## 2022-03-23 PROCEDURE — 3066F NEPHROPATHY DOC TX: CPT | Mod: CPTII,S$GLB,, | Performed by: NURSE PRACTITIONER

## 2022-03-23 PROCEDURE — 99214 PR OFFICE/OUTPT VISIT, EST, LEVL IV, 30-39 MIN: ICD-10-PCS | Mod: S$GLB,,, | Performed by: NURSE PRACTITIONER

## 2022-03-23 PROCEDURE — 3062F PR POS MACROALBUMINURIA RESULT DOCUMENTED/REVIEW: ICD-10-PCS | Mod: CPTII,S$GLB,, | Performed by: NURSE PRACTITIONER

## 2022-03-23 PROCEDURE — 3008F BODY MASS INDEX DOCD: CPT | Mod: CPTII,S$GLB,, | Performed by: NURSE PRACTITIONER

## 2022-03-23 PROCEDURE — 3046F PR MOST RECENT HEMOGLOBIN A1C LEVEL > 9.0%: ICD-10-PCS | Mod: CPTII,S$GLB,, | Performed by: NURSE PRACTITIONER

## 2022-03-23 PROCEDURE — 3046F HEMOGLOBIN A1C LEVEL >9.0%: CPT | Mod: CPTII,S$GLB,, | Performed by: NURSE PRACTITIONER

## 2022-03-23 PROCEDURE — 4010F PR ACE/ARB THEARPY RXD/TAKEN: ICD-10-PCS | Mod: CPTII,S$GLB,, | Performed by: NURSE PRACTITIONER

## 2022-03-23 PROCEDURE — 3074F SYST BP LT 130 MM HG: CPT | Mod: CPTII,S$GLB,, | Performed by: NURSE PRACTITIONER

## 2022-03-23 PROCEDURE — 1159F PR MEDICATION LIST DOCUMENTED IN MEDICAL RECORD: ICD-10-PCS | Mod: CPTII,S$GLB,, | Performed by: NURSE PRACTITIONER

## 2022-03-23 PROCEDURE — 3074F PR MOST RECENT SYSTOLIC BLOOD PRESSURE < 130 MM HG: ICD-10-PCS | Mod: CPTII,S$GLB,, | Performed by: NURSE PRACTITIONER

## 2022-03-23 PROCEDURE — 4010F ACE/ARB THERAPY RXD/TAKEN: CPT | Mod: CPTII,S$GLB,, | Performed by: NURSE PRACTITIONER

## 2022-03-23 RX ORDER — LISINOPRIL 40 MG/1
40 TABLET ORAL DAILY
Qty: 90 TABLET | Refills: 3 | Status: SHIPPED | OUTPATIENT
Start: 2022-03-23 | End: 2022-04-25

## 2022-03-23 RX ORDER — DULAGLUTIDE 1.5 MG/.5ML
1.5 INJECTION, SOLUTION SUBCUTANEOUS
Qty: 4 PEN | Refills: 6 | Status: SHIPPED | OUTPATIENT
Start: 2022-03-23 | End: 2022-12-27

## 2022-03-23 RX ORDER — ATORVASTATIN CALCIUM 80 MG/1
80 TABLET, FILM COATED ORAL DAILY
Qty: 90 TABLET | Refills: 3 | Status: SHIPPED | OUTPATIENT
Start: 2022-03-23 | End: 2023-04-06 | Stop reason: SDUPTHER

## 2022-03-23 RX ORDER — CARVEDILOL 12.5 MG/1
12.5 TABLET ORAL 2 TIMES DAILY
Qty: 180 TABLET | Refills: 3 | Status: ON HOLD | OUTPATIENT
Start: 2022-03-23 | End: 2022-11-28 | Stop reason: SDUPTHER

## 2022-03-23 RX ORDER — BLOOD-GLUCOSE SENSOR
1 EACH MISCELLANEOUS
Qty: 3 EACH | Refills: 11 | Status: SHIPPED | OUTPATIENT
Start: 2022-03-23 | End: 2022-03-23

## 2022-03-23 RX ORDER — BLOOD-GLUCOSE TRANSMITTER
1 EACH MISCELLANEOUS DAILY
Qty: 1 EACH | Refills: 3 | Status: SHIPPED | OUTPATIENT
Start: 2022-03-23 | End: 2022-03-23

## 2022-03-23 RX ORDER — FLASH GLUCOSE SENSOR
1 KIT MISCELLANEOUS
Qty: 2 KIT | Refills: 11 | Status: SHIPPED | OUTPATIENT
Start: 2022-03-23 | End: 2022-08-09

## 2022-03-23 RX ORDER — METFORMIN HYDROCHLORIDE 1000 MG/1
1000 TABLET ORAL 2 TIMES DAILY WITH MEALS
Qty: 180 TABLET | Refills: 3 | Status: SHIPPED | OUTPATIENT
Start: 2022-03-23 | End: 2023-04-06 | Stop reason: SDUPTHER

## 2022-03-23 NOTE — PROGRESS NOTES
56 y.o. male here for 1 month follow up for management of T2dm -   Last seen 2/23/2022 - those notes are below.     a1c last @ 12.2%, however his bg's since last visit are coming down tremendously.   I began him on trulicity 0.75mg SC weekly at his last visit - he gives every Thursday.   States his glucoses have come down into the 150's on average.   I submitted for the dexcom G6 - but it was going to be $900+ dollars through DME - so he didn't get/use.   He did try a sample florentino 2 - he liked using it - however didn't bring the reader with him today -   He would like a cgm going forward if possible.   For now, continues to fingerstick.     He continues on metformin 1000 bid.   trulicity 0.75 weekly.   Also on insulin pump and I adjusted settings on his pump at last visit.     medtronic 770 -   He remains in manual mode -   Total daily dose of insulin is 77.3 units/day.   59% is coming from the basal rate.   41% is coming from the bolus.   chaning his set every 3 days.   Active insulin time is at 4 hours.   Giving preset boluses -  5 units - snack.   10 units - regular meal.   15 units - medium meal.   He eats about 2 meals per day on average and snacks.   Sometimes bolusing just 2 times per day.     Settings:   Basal rate is at 1.9 units/hour.   Carb ratio is at 1: 56 -   ISF is at 1: 25   bg target is at 110 - 120.       He also has back up insulin - lantus for once per day.   Asking for refills for his bp and cholesterol meds.   Right foot ulcer - continues to heal.   Follows with podiatry - just started on cipro twice per day.       Last visit notes from 2/23/2022  56 y.o. gentleman, here for 2 week follow up for management of T2dm -   Last seen 2/9/2022 - those notes are below.     Historically uncontrolled - last a1c is @ 12.2%.   Had ulcers on foot, followed by staph infection, and then sustained a toe amputation.   On metformin 1000 bid.   I prescribed him farxiga - 5mg tablet daily - but he didn't start as  "the cost was too high.     Continues on medtronic 770 insulin pump - I had increased his rates at last visit -   He had been on novolin R in his pump as this was cheaper he found at [facility]. I switched him to humalog - gave voucher for $35 through [email or web address] - however stated it was still too expensive.   As his sugars running continuously high - we made adjustments to his pump. He feels this has helped somewhat.     Pump downloaded today -   Reviewed - see scanned in  -   100% in manual mode   TDD is 59.4 units/day   68% coming from basal rate.   32% coming from bolusing.   He does not carb count.   Uses preset boluses 5 units for a snack, 10 units for meal time boluses.   He boluses anywhere from 1 - 2 times per day on average in looking at his report.     Settings:   Active insulin time is 4 hours.   Basal rate is at 1.8 units/hour.   Carb ratio is 1: 6   ISF is at 1: 25   bg target is 110 - 125     He does not use cgm - states has high deductible.   Had tried to order him a guardian cgm in the past - to be closed loop with his pump, but he never did purchase.   He had a florentino Pro placed in office in December 2020 but did not continue on or use a personal.   He checks his glucose by fingerstick.   No logs with him today.   His sugar today in clinic is at 355. I showed him how to give a correction bolus on his pump and we walked through to use the bolus wizard together.   I had ordered him a dexcom at last visit however he never got b/c the cost was too high.   (it was $700 so he didn't  ).         Last visit notes from 2/9/2022 as follows:   56 y.o. male here for 1.5 year follow up -   Last seen 12/2020 - those notes are below.   That was our initial consult, and then he followed with diabetes education - has been lost to follow since.     a1c now up from 10.5% ---> 12.2%.   Historically uncontrolled glucoses and diabetes -   States "I feel great!"   However admits "feels tired just when he works " "long hours/shifts" - states he is working almost 7 days per week.   Lives at home with his wife.   Reports staph infection in his left foot worsened, - had to amputate his large toe on his left toe and 2nd bone.   States this occurred in July 2021 -     Current meds:   Metformin 100 bid.   Had prescribed him jardiance - but he never took  - states the cost had been too much.   He was on MDI - and switched him to pump therapy -   (formerly on lantus 60 every HS and novolin R 25 units tid ac meals).     He began on medtronic pump shortly after I saw him - but then never followed up.   He is on medtronic 770 pump - his insurance has high cost for humalog or novolog.   He is on novolin R in his insulin pump. Found cheaper at [facility] by the vial.   He often does not bolus with meals - only 1 - 2 times per week -   He eats however 5 times per day - 3 meals and 2 snacks -   Has never carb counted before - but admits he usually just "estimates" how much insulin he needs.   (usually just 6 - 7 units with meals).     medtronic pump downloaded and reviewed today -   Insulin rate is at 1.5 units/hour -   Carb ratio is at 1: 6 - but he does not input carbs.   ISF 25 -   bg target 110 - 120   Manual mode is 100%   Average bg is 308 -   Total daily dose is 40 units   85% is coming from basal rate.   15% is coming from bolusing.   Active insulin time is set for 4 hours.     Checking sugars with a glucometer - Relion meter - doesn't have his meter with him.   Has never used cgm  Therapy.   I had ordered guardian for him to go with his pump - but never was covered or cost was too much for him.   Last checked glucose yesterday.   Had small piece of lasagna for lunch - states gave 6 units of insulin   No acute changes in physical complaints.   Has seen eye doctor - states no acute changes in vision - still wears glasses -       Last visit notes from 12/2020:   HPI: Billy Rivera is a 56 y.o.  male c/I for visit to address " Diabetes Type 2  This is the first time I am seeing him for care, follows with Dr. RBAD Baker MD for primary care needs.   Recently has switched to seeing dr. Foerman for primary care needs.   Has not seen endocrinology or diabetes specialist in the past.     He had recent covid 19 infection, but this has since resolved.   He was diagnosed with T2DM about 20 years ago  Family history - both sides strong history.   Has never been hospitalized r/t DM.  Denies missing doses of DM medication.     Current a1c @ 10.5%. Historically uncontrolled in past.   Current meds -   Metformin 1000 bid.   Began insulin shortly after his diagnosis.   lantus 55 units every night.   novolin R ac meals - 25 units tid.  (previously on novolog but insurance stopped covering).   Tried trulicity in his past, but caused severe constipation. Does not really want to try drug class again for that reason.   Has never tried SGLT2i's.     He is feeling well, does not feel when his sugars are high or low.  Does not titrate his insulin based on glucose readings. Generally always gives the full 25 units as his sugars just remain high regardless  Of what he eats or what he does.   He admits not always following a diabetic diet, finds it hard to eliminate carbs or limit them.     Complications from diabetes -   +Retinopathy.   + CV disease   + history of MI, Nstemi in May 2013. Got stent placed.   -nephropathy.   Large foot ulcer on left foot - receiving IV antibiotics via right upper arm picc line.   In boot with wound vac. Following with podiatry.        Past medical History:   Past Medical History:   Diagnosis Date    Allergy     CAD (coronary artery disease), native coronary artery 6/25/2013    Cancer     COVID-19 virus detected 9/1/2020    Diabetes mellitus     Diabetes mellitus, type 2     Disorder of kidney and ureter     Heart attack 04/2012    Hx of colon cancer, stage I     Hyperlipidemia     Hypertension     Muscular pain      post-op after colonoscopy    NSTEMI May 2013 - peak troponin 0.22 5/22/2013    MICHAELA (obstructive sleep apnea)     Retinopathy due to secondary diabetes       Family hx:   Family History   Problem Relation Age of Onset    Heart disease Mother     Diabetes Mother     Diabetes Father     Heart disease Brother     Diabetes Maternal Grandmother     Diabetes Maternal Grandfather     Diabetes Paternal Grandmother     Diabetes Paternal Grandfather     Anesthesia problems Neg Hx       Current meds:   Current Outpatient Medications:     acetaminophen (TYLENOL) 325 MG tablet, Take 325 mg by mouth every 6 (six) hours as needed for Pain., Disp: , Rfl:     aspirin (ECOTRIN) 81 MG EC tablet, Take 1 tablet (81 mg total) by mouth once daily., Disp: , Rfl: 0    aspirin/salicylamide/caffeine (BC HEADACHE POWDER ORAL), Take 1 Package by mouth daily as needed (pain)., Disp: , Rfl:     blood sugar diagnostic (ACCU-CHEK GUIDE TEST STRIPS) Strp, 1 each by Misc.(Non-Drug; Combo Route) route 5 (five) times daily., Disp: 150 each, Rfl: 11    ciprofloxacin HCl (CIPRO) 500 MG tablet, Take 1 tablet (500 mg total) by mouth every 12 (twelve) hours. for 10 days, Disp: 20 tablet, Rfl: 0    clotrimazole-betamethasone 1-0.05% (LOTRISONE) cream, Apply topically 2 (two) times daily., Disp: 45 g, Rfl: 2    ibuprofen (ADVIL,MOTRIN) 200 MG tablet, Take 200 mg by mouth every 6 (six) hours as needed for Pain., Disp: , Rfl:     insulin (LANTUS SOLOSTAR U-100 INSULIN) glargine 100 units/mL (3mL) SubQ pen, Inject 60 Units into the skin once daily., Disp: 15 mL, Rfl: 0    insulin lispro (HUMALOG U-100 INSULIN) 100 unit/mL injection, Inject 100 Units into the skin continuous. Gives via insulin pump only. Do not Directly inject., Disp: 50 mL, Rfl: 11    lancets (ACCU-CHEK FASTCLIX LANCET DRUM) Misc, 1 each by Misc.(Non-Drug; Combo Route) route Daily., Disp: 30 each, Rfl: 11    melatonin (MELATIN) 3 mg tablet, Take 2 tablets (6 mg total) by  "mouth nightly as needed for Insomnia., Disp:  , Rfl: 0    pen needle, diabetic 31 gauge x 3/16" Ndle, Inject 1 each into the skin 3 (three) times daily before meals. (Patient taking differently: Inject 1 each into the skin 4 (four) times daily.), Disp: 100 each, Rfl: 12    atorvastatin (LIPITOR) 80 MG tablet, Take 1 tablet (80 mg total) by mouth once daily. Take every night., Disp: 90 tablet, Rfl: 3    carvediloL (COREG) 12.5 MG tablet, Take 1 tablet (12.5 mg total) by mouth 2 (two) times daily., Disp: 180 tablet, Rfl: 3    flash glucose sensor (FREESTYLE MICHAEL 2 SENSOR) Kit, 1 each by Misc.(Non-Drug; Combo Route) route every 14 (fourteen) days., Disp: 2 kit, Rfl: 11    lisinopriL (PRINIVIL,ZESTRIL) 40 MG tablet, Take 1 tablet (40 mg total) by mouth once daily., Disp: 90 tablet, Rfl: 3    metFORMIN (GLUCOPHAGE) 1000 MG tablet, Take 1 tablet (1,000 mg total) by mouth 2 (two) times daily with meals., Disp: 180 tablet, Rfl: 3    MINIMED 770G INSULIN PUMP Misc, 1 each by Misc.(Non-Drug; Combo Route) route continuous. Medically necessary for management of Type 2 diabetes, E11.65, Disp: 1 each, Rfl: 0    TRULICITY 1.5 mg/0.5 mL pen injector, Inject 1.5 mg into the skin every 7 days., Disp: 4 pen, Rfl: 6     Current Diabetes medications:   novolin r via insulin pump (purchases from [employer]) --  Now has changed to humalog and got $35/month with voucher.   Metformin 1000 mg po bid with food  Trulicity 0.75mg sc every week.     Medications Tried and Failed:   trulicity - severe constipation   novolog - insurance wouldn't cover so switched to novolin r.  Long acting insulin: lantus 55 units every night.   Short acting insulin:  novolin r 25 units tid ac meals - takes 5 - 15 minutes prior to meals.   jardiance - was prescribed, but he never took as the cost was too much.   farxiga - was prescribed     Review of Pertinent co-morbidities/risk factors:   CV: + history of MI   CAD: yes nstemi with stent.   Now history of " "left foot toe amputation.   Takes aspirin 81mg tablet daily  BP: has history of HTN  Statin: Taking  ACE/ARB: Taking    Social History     Tobacco Use   Smoking Status Never Smoker   Smokeless Tobacco Never Used        Social:   Lives at home with: wife.   Life changes/stressors currently: has ulcer to left foot - so off work for now while this heals.   Diet:  Not always following ADA diet   Meals: 3 per day and snacks.        Breakfast - cornflakes with milk, eggs, oatmeal       Lunch - precooked meals from [employer]. (meat/veggie/small carb).        Dinner - hot plate meals from restaurants - meat/veggie/starch. Chicken and vegetables, beef, occasionally rice.   Spaghetti and meatballs.        Snacks - doritos bags, cheetos, kind bar reduced sugar.        Drinks - diet tea, milk, diet lemonade, diet coke, water.   Exercise: nothing formal, but normally walking a lot in his work.   Activities: was working full time as  at pen[employer]. No longer working.     Glucose Monitoring:   Checking sugars 4x/day by fingerstick.  Has relion meter.    CGM - has never used, Free style florentino and dexcom are both excluded from his insurance plan in the past -   Had prescribed guardian cgm - but never was covered.     Standards of care:   Eyes: .: 08/15/2019  Foot exam: : 09/20/2021   Diabetes education: yes - on start of insulin pump therapy.     Vital Signs  /80 (BP Location: Right arm, Patient Position: Sitting, BP Method: Large (Manual))   Pulse 67   Temp 97.7 °F (36.5 °C) (Temporal)   Resp 13   Ht 6' 2" (1.88 m)   Wt 128.3 kg (282 lb 13.6 oz)   SpO2 98%   BMI 36.32 kg/m²     Pertinent Labs:   Hgba1c   Lab Results   Component Value Date    HGBA1C 12.2 (H) 01/26/2022    HGBA1C 10.5 (H) 12/22/2020    HGBA1C 10.8 (H) 10/13/2020     Lipid panel   Lab Results   Component Value Date    CHOL 175 01/26/2022    CHOL 144 08/12/2020    CHOL 172 08/07/2019     Lab Results   Component Value Date    HDL 43 01/26/2022    " HDL 39 (L) 08/12/2020    HDL 40 08/07/2019     Lab Results   Component Value Date    LDLCALC 93.2 01/26/2022    LDLCALC 66.6 08/12/2020    LDLCALC 100.8 08/07/2019     Lab Results   Component Value Date    TRIG 194 (H) 01/26/2022    TRIG 192 (H) 08/12/2020    TRIG 156 (H) 08/07/2019     Lab Results   Component Value Date    CHOLHDL 24.6 01/26/2022    CHOLHDL 27.1 08/12/2020    CHOLHDL 23.3 08/07/2019      CMP  Glucose   Date Value Ref Range Status   01/26/2022 265 (H) 70 - 110 mg/dL Final     BUN   Date Value Ref Range Status   01/26/2022 30 (H) 6 - 20 mg/dL Final     Creatinine   Date Value Ref Range Status   01/26/2022 1.3 0.5 - 1.4 mg/dL Final     eGFR if    Date Value Ref Range Status   01/26/2022 >60.0 >60 mL/min/1.73 m^2 Final     eGFR if non    Date Value Ref Range Status   01/26/2022 >60.0 >60 mL/min/1.73 m^2 Final     Comment:     Calculation used to obtain the estimated glomerular filtration  rate (eGFR) is the CKD-EPI equation.        AST   Date Value Ref Range Status   01/26/2022 26 10 - 40 U/L Final     ALT   Date Value Ref Range Status   01/26/2022 39 10 - 44 U/L Final     Microalbumin creatinine ratio:   Lab Results   Component Value Date    MICALBCREAT 415.8 (H) 01/26/2022       Review Of Systems:   Gen: Appetite good, no weight loss or gain,  No fatigue. Denies polydipsia.  Skin: Skin is intact and heals well, denies any rashes or hair changes.   EENT: Denies any acute visual disturbances, nor blurred vision.   Resp: Denies SOB or Dyspnea on exertion, denies cough.   Cardiac: Denies chest pain, palpitations, or swelling.   GI: Denies abdominal pain, nausea or vomiting, diarrhea, or constipation.   /GYN: + nocturia several times per night - nor burning, frequency or pain on urination.  MS/Neuro: Denies numbness/ tingling in BLE; unsteady Gait, uses boot.   speech clear  Psych: Denies drug/ETOH abuse, no hx of depression.  Other systems: negative.    Physical Exam:    GENERAL: Well developed, well nourished in appearance.   PSYCH: AAOx3, appropriate mood and affect, pleasant expression, conversant, appears relaxed, well groomed.   EYES: PERRL, Conjunctiva and corneas clear  NECK: Soft and Supple, trachea midline  CHEST: Even, regular, and unlabored respirations  ABDOMEN: Soft, non-tender, non-distended. Normal bowel sounds.   VASCULAR: pedal pulses palpable bilaterally, no edema.  NEURO:  cranial nerves II - XII intact   MUSCULOSKELETAL: Good ROM,  unsteady gait. Due to boot on right foot.   SKIN: Skin warm, dry, and intact   FEET: right foot in boot - amputation on left foot/large toe.     Assessment and Plan of Care:     Billy was seen today for diabetes and follow-up.    Diagnoses and all orders for this visit:    Type 2 diabetes mellitus 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, with long-term current use of insulin  -     Discontinue: blood-glucose sensor (DEXCOM G6 SENSOR) Haley; 1 each by Misc.(Non-Drug; Combo Route) route every 10 days. Apply/change sensor to skin every 10 days.  -     Discontinue: blood-glucose transmitter (DEXCOM G6 TRANSMITTER) Haley; 1 each by Misc.(Non-Drug; Combo Route) route Daily. Use transmitter in sensor with G6 system. Change new transmitter every 3 months.  -     POCT Glucose, Hand-Held Device    Type 2 diabetes mellitus with hyperglycemia, with long-term current use of insulin  -     metFORMIN (GLUCOPHAGE) 1000 MG tablet; Take 1 tablet (1,000 mg total) by mouth 2 (two) times daily with meals.    Other orders  -     TRULICITY 1.5 mg/0.5 mL pen injector; Inject 1.5 mg into the skin every 7 days.  -     lisinopriL (PRINIVIL,ZESTRIL) 40 MG tablet; Take 1 tablet (40 mg total) by mouth once daily.  -     atorvastatin (LIPITOR) 80 MG tablet; Take 1 tablet (80 mg total) by mouth once daily. Take every night.  -     carvediloL (COREG) 12.5 MG tablet; Take 1 tablet (12.5 mg total) by mouth 2 (two) times  daily.  -     flash glucose sensor (FREESTYLE MICHAEL 2 SENSOR) Kit; 1 each by Misc.(Non-Drug; Combo Route) route every 14 (fourteen) days.       1. T2DM with hyperglycemia- Hgba1c goal is 7.5% or less without hypoglycemia - 11.5%--> 10.8%--> 10.5%--> 12.2%    discussed DM, progression of disease, long term complications, CV risk factors and tx options.   Has already had MI in past.   Continue metformin 1000 bid.   Sent in humalog and gave him [employer] voucher for $35/month vials.   Would rather him be on novolog instead of novolin R due to onset of action time and safety profile.   Continue on medtronic 770 insulin pump - increased presets from 10 units with meals to 15 units with meals.   dexcom covered through medical benefits was $900 due to his high deductible plan.   Sent into pharmacy and was not covered.   Michael 2 sent into pharmacy.   If not covered, can send into DME if needed. However he is willing to pay cash   A CGM is MEDICALLY NECESSARY as it will allow me to virtually and more closely monitor glucose readings  This enables me to make any necessary adjustments to the patients diabetes regimen while keeping the patient and staff safe during the COVID-19 PHE.Meet with Diabetes education to start/train on this upcoming Friday.   Encouraged to bolus for meals -   Entered in presets for him at last visit -   5 units for a snack.   10 or 15 units for medium or large meal.   Advised on over bolusing/correcting with just 5 units.   Advised to please avoid fast food, chips, processed foods.   Increased basal rate from 1.5 to 1.8 u/hour at last visit --- increase to 1.9 units/hour at last visit.   No changes today.   Gave him rx of lantus or back up insulin in case of pump failure.   Patient likes to talk/text/use his smart phone so looking forward to insulin pump and getting sugars under control.   Continue trulicity - increase dose from 0.75 to 1.5mg every week.   No history of pancreatitis or medullary thyroid  cancer.   May plan to increase dose of trulicity if tolerated well.   Notify me if side effects such as severe abdominal pain, nausea, diarrhea.   Advise compliance with ADA diet and encourage exercise  Reviewed  hypoglycemia, s/s and appropriate tx. Have/get quick acting glucose tablets at hand.   Instructed to monitor Blood glucose 2 - 4x/day and bring meter/ log to every clinic visit.   Until gets on florentino 2  - use relion meter. Enter glucoses into pump prior to bolus.     2. HTN - controlled, continue meds as previously prescribed and monitor.   Coreg 12.5 po bid.   Lisinopril 40.   Urine mac + 202 ---> 415 -     3. HLD - LDL goal < 100. He is at goal.   Currently on statin therapy- lipitor 80 mg every HS.   History of MI/nstemi.   On aspirin daily.     4. Weight - BMI Body mass index is 36.32 kg/m².   Encourage Ada diet and exercise.   Continue glp1 - increase trulicity from 0.75 to 1.5mg every week.     5. Renal Function - stable. No history of nephropathy.   Has clinical urine mac +     6. Left foot - had infection - now resulted in amputation - advised to gain compliance and not miss meal time boluses.   The high sugars are responsible culprit for the infection.   Following with podiatry.   Now has started on cipro      Retry for florentino 2 - sending rx to pharmacy -   dexcom not covered.   Continue trulicity - increase dose from 0.75 to 1.5mg every week.   F/u in 6 weeks with OV.

## 2022-03-23 NOTE — PATIENT INSTRUCTIONS
Continue metformin 1000mg twice per day.   Continue trulicity injection every week - increase dose from 0.75 to 1.5mg every week.   Eat lots of vegetables and drink water daily to help with preventing constipation.   If you are getting constipated, to prevent this - you can take Colace - 100mg twice per day (this is a stool softener) -   If still constipated, take miralax or a laxative prn.     Continue on insulin pump - set at 1.9 units/hour.   Preset boluses for 5, 10, or 15 units.     Remember if insulin pump fails, you can always use back up insulin - lantus 60 units once per day.   Then you can take humalog or novolin R 5 - 10 units with your meals.     For low blood sugar - remember to keep glucose tablets on hand. You can purchase these at the pharmacy check out desk/over the counter.   If blood sugar is less than 70, take/eat 2 - 3 tablets quickly to bring your sugar up.   Always keep 15 grams of Quick acting carbohydrates on hand to eat/drink if your sugar is low - examples are 1/2 cup of juice, coke, or crackers, granola bars.   Remember to eat meals frequently to prevent low blood blood sugars.

## 2022-03-24 ENCOUNTER — PATIENT OUTREACH (OUTPATIENT)
Dept: ADMINISTRATIVE | Facility: OTHER | Age: 57
End: 2022-03-24
Payer: COMMERCIAL

## 2022-03-28 ENCOUNTER — OFFICE VISIT (OUTPATIENT)
Dept: PODIATRY | Facility: CLINIC | Age: 57
End: 2022-03-28
Payer: COMMERCIAL

## 2022-03-28 VITALS
WEIGHT: 282.88 LBS | DIASTOLIC BLOOD PRESSURE: 85 MMHG | BODY MASS INDEX: 36.32 KG/M2 | SYSTOLIC BLOOD PRESSURE: 156 MMHG | HEART RATE: 56 BPM

## 2022-03-28 DIAGNOSIS — E11.49 TYPE II DIABETES MELLITUS WITH NEUROLOGICAL MANIFESTATIONS: Primary | ICD-10-CM

## 2022-03-28 DIAGNOSIS — L97.513 ULCER OF RIGHT FOOT WITH NECROSIS OF MUSCLE: ICD-10-CM

## 2022-03-28 DIAGNOSIS — L97.522 ULCER OF LEFT FOOT, WITH FAT LAYER EXPOSED: ICD-10-CM

## 2022-03-28 PROCEDURE — 99999 PR PBB SHADOW E&M-EST. PATIENT-LVL III: ICD-10-PCS | Mod: PBBFAC,,, | Performed by: PODIATRIST

## 2022-03-28 PROCEDURE — 3066F NEPHROPATHY DOC TX: CPT | Mod: CPTII,S$GLB,, | Performed by: PODIATRIST

## 2022-03-28 PROCEDURE — 3062F PR POS MACROALBUMINURIA RESULT DOCUMENTED/REVIEW: ICD-10-PCS | Mod: CPTII,S$GLB,, | Performed by: PODIATRIST

## 2022-03-28 PROCEDURE — 3079F PR MOST RECENT DIASTOLIC BLOOD PRESSURE 80-89 MM HG: ICD-10-PCS | Mod: CPTII,S$GLB,, | Performed by: PODIATRIST

## 2022-03-28 PROCEDURE — 3077F SYST BP >= 140 MM HG: CPT | Mod: CPTII,S$GLB,, | Performed by: PODIATRIST

## 2022-03-28 PROCEDURE — 3079F DIAST BP 80-89 MM HG: CPT | Mod: CPTII,S$GLB,, | Performed by: PODIATRIST

## 2022-03-28 PROCEDURE — 99213 PR OFFICE/OUTPT VISIT, EST, LEVL III, 20-29 MIN: ICD-10-PCS | Mod: 25,S$GLB,, | Performed by: PODIATRIST

## 2022-03-28 PROCEDURE — 99999 PR PBB SHADOW E&M-EST. PATIENT-LVL III: CPT | Mod: PBBFAC,,, | Performed by: PODIATRIST

## 2022-03-28 PROCEDURE — 99213 OFFICE O/P EST LOW 20 MIN: CPT | Mod: 25,S$GLB,, | Performed by: PODIATRIST

## 2022-03-28 PROCEDURE — 3046F PR MOST RECENT HEMOGLOBIN A1C LEVEL > 9.0%: ICD-10-PCS | Mod: CPTII,S$GLB,, | Performed by: PODIATRIST

## 2022-03-28 PROCEDURE — 3046F HEMOGLOBIN A1C LEVEL >9.0%: CPT | Mod: CPTII,S$GLB,, | Performed by: PODIATRIST

## 2022-03-28 PROCEDURE — 3008F BODY MASS INDEX DOCD: CPT | Mod: CPTII,S$GLB,, | Performed by: PODIATRIST

## 2022-03-28 PROCEDURE — 3008F PR BODY MASS INDEX (BMI) DOCUMENTED: ICD-10-PCS | Mod: CPTII,S$GLB,, | Performed by: PODIATRIST

## 2022-03-28 PROCEDURE — 3077F PR MOST RECENT SYSTOLIC BLOOD PRESSURE >= 140 MM HG: ICD-10-PCS | Mod: CPTII,S$GLB,, | Performed by: PODIATRIST

## 2022-03-28 PROCEDURE — 1159F PR MEDICATION LIST DOCUMENTED IN MEDICAL RECORD: ICD-10-PCS | Mod: CPTII,S$GLB,, | Performed by: PODIATRIST

## 2022-03-28 PROCEDURE — 4010F ACE/ARB THERAPY RXD/TAKEN: CPT | Mod: CPTII,S$GLB,, | Performed by: PODIATRIST

## 2022-03-28 PROCEDURE — 3066F PR DOCUMENTATION OF TREATMENT FOR NEPHROPATHY: ICD-10-PCS | Mod: CPTII,S$GLB,, | Performed by: PODIATRIST

## 2022-03-28 PROCEDURE — 4010F PR ACE/ARB THEARPY RXD/TAKEN: ICD-10-PCS | Mod: CPTII,S$GLB,, | Performed by: PODIATRIST

## 2022-03-28 PROCEDURE — 3062F POS MACROALBUMINURIA REV: CPT | Mod: CPTII,S$GLB,, | Performed by: PODIATRIST

## 2022-03-28 PROCEDURE — 1159F MED LIST DOCD IN RCRD: CPT | Mod: CPTII,S$GLB,, | Performed by: PODIATRIST

## 2022-04-11 ENCOUNTER — OFFICE VISIT (OUTPATIENT)
Dept: PODIATRY | Facility: CLINIC | Age: 57
End: 2022-04-11
Payer: COMMERCIAL

## 2022-04-11 VITALS
DIASTOLIC BLOOD PRESSURE: 101 MMHG | WEIGHT: 282.88 LBS | BODY MASS INDEX: 36.32 KG/M2 | HEART RATE: 73 BPM | SYSTOLIC BLOOD PRESSURE: 148 MMHG

## 2022-04-11 DIAGNOSIS — L97.522 ULCER OF LEFT FOOT, WITH FAT LAYER EXPOSED: ICD-10-CM

## 2022-04-11 DIAGNOSIS — E11.49 TYPE II DIABETES MELLITUS WITH NEUROLOGICAL MANIFESTATIONS: ICD-10-CM

## 2022-04-11 DIAGNOSIS — S98.132A AMPUTATION OF TOE OF LEFT FOOT: ICD-10-CM

## 2022-04-11 DIAGNOSIS — L97.512 ULCER OF RIGHT FOOT WITH FAT LAYER EXPOSED: Primary | ICD-10-CM

## 2022-04-11 PROCEDURE — 3077F SYST BP >= 140 MM HG: CPT | Mod: CPTII,S$GLB,, | Performed by: PODIATRIST

## 2022-04-11 PROCEDURE — 3080F PR MOST RECENT DIASTOLIC BLOOD PRESSURE >= 90 MM HG: ICD-10-PCS | Mod: CPTII,S$GLB,, | Performed by: PODIATRIST

## 2022-04-11 PROCEDURE — 3077F PR MOST RECENT SYSTOLIC BLOOD PRESSURE >= 140 MM HG: ICD-10-PCS | Mod: CPTII,S$GLB,, | Performed by: PODIATRIST

## 2022-04-11 PROCEDURE — 99213 OFFICE O/P EST LOW 20 MIN: CPT | Mod: 25,S$GLB,, | Performed by: PODIATRIST

## 2022-04-11 PROCEDURE — 3062F PR POS MACROALBUMINURIA RESULT DOCUMENTED/REVIEW: ICD-10-PCS | Mod: CPTII,S$GLB,, | Performed by: PODIATRIST

## 2022-04-11 PROCEDURE — 1159F PR MEDICATION LIST DOCUMENTED IN MEDICAL RECORD: ICD-10-PCS | Mod: CPTII,S$GLB,, | Performed by: PODIATRIST

## 2022-04-11 PROCEDURE — 3046F HEMOGLOBIN A1C LEVEL >9.0%: CPT | Mod: CPTII,S$GLB,, | Performed by: PODIATRIST

## 2022-04-11 PROCEDURE — 3066F NEPHROPATHY DOC TX: CPT | Mod: CPTII,S$GLB,, | Performed by: PODIATRIST

## 2022-04-11 PROCEDURE — 1159F MED LIST DOCD IN RCRD: CPT | Mod: CPTII,S$GLB,, | Performed by: PODIATRIST

## 2022-04-11 PROCEDURE — 3008F BODY MASS INDEX DOCD: CPT | Mod: CPTII,S$GLB,, | Performed by: PODIATRIST

## 2022-04-11 PROCEDURE — 99213 PR OFFICE/OUTPT VISIT, EST, LEVL III, 20-29 MIN: ICD-10-PCS | Mod: 25,S$GLB,, | Performed by: PODIATRIST

## 2022-04-11 PROCEDURE — 3080F DIAST BP >= 90 MM HG: CPT | Mod: CPTII,S$GLB,, | Performed by: PODIATRIST

## 2022-04-11 PROCEDURE — 3066F PR DOCUMENTATION OF TREATMENT FOR NEPHROPATHY: ICD-10-PCS | Mod: CPTII,S$GLB,, | Performed by: PODIATRIST

## 2022-04-11 PROCEDURE — 3046F PR MOST RECENT HEMOGLOBIN A1C LEVEL > 9.0%: ICD-10-PCS | Mod: CPTII,S$GLB,, | Performed by: PODIATRIST

## 2022-04-11 PROCEDURE — 3008F PR BODY MASS INDEX (BMI) DOCUMENTED: ICD-10-PCS | Mod: CPTII,S$GLB,, | Performed by: PODIATRIST

## 2022-04-11 PROCEDURE — 3062F POS MACROALBUMINURIA REV: CPT | Mod: CPTII,S$GLB,, | Performed by: PODIATRIST

## 2022-04-11 PROCEDURE — 11042 DEBRIDEMENT: ICD-10-PCS | Mod: S$GLB,,, | Performed by: PODIATRIST

## 2022-04-11 PROCEDURE — 4010F ACE/ARB THERAPY RXD/TAKEN: CPT | Mod: CPTII,S$GLB,, | Performed by: PODIATRIST

## 2022-04-11 PROCEDURE — 4010F PR ACE/ARB THEARPY RXD/TAKEN: ICD-10-PCS | Mod: CPTII,S$GLB,, | Performed by: PODIATRIST

## 2022-04-11 PROCEDURE — 99999 PR PBB SHADOW E&M-EST. PATIENT-LVL IV: CPT | Mod: PBBFAC,,, | Performed by: PODIATRIST

## 2022-04-11 PROCEDURE — 11042 DBRDMT SUBQ TIS 1ST 20SQCM/<: CPT | Mod: S$GLB,,, | Performed by: PODIATRIST

## 2022-04-11 PROCEDURE — 99999 PR PBB SHADOW E&M-EST. PATIENT-LVL IV: ICD-10-PCS | Mod: PBBFAC,,, | Performed by: PODIATRIST

## 2022-04-11 NOTE — PROGRESS NOTES
Subjective:      Patient ID: Billy Rivera is a 56 y.o. male.    Chief Complaint: Wound Care (Ulcer on right foot )    Billy is a 56 y.o. male who presents to the clinic for evaluation and treatment of high risk feet. Billy has a past medical history of Allergy, CAD (coronary artery disease), native coronary artery (6/25/2013), Cancer, COVID-19 virus detected (9/1/2020), Diabetes mellitus, Diabetes mellitus, type 2, Disorder of kidney and ureter, Heart attack (04/2012), colon cancer, stage I, Hyperlipidemia, Hypertension, Muscular pain, NSTEMI May 2013 - peak troponin 0.22 (5/22/2013), MICHAELA (obstructive sleep apnea), and Retinopathy due to secondary diabetes. The patient's chief complaint is foot ulcer    Patient presents to clinic for 2 week wound care check to bilateral feet. He has been ambulating in a tennis shoe on the left foot, DARCO shoe on the right foot. He has been changing the wounds on both feet and alternating between iodosorb and medihoney. Denies any increased drainage, redness, or malodor. Denies nausea, vomiting, fever, or chills.     PCP: BRAD Baker MD    Date Last Seen by PCP: 3/23/2022    Current shoe gear:  Affected Foot: DARCO Shoe right foot     Affected Foot: Tennis shoe left foot    History of Trauma: negative  Sign of Infection: none    Hemoglobin A1C   Date Value Ref Range Status   01/26/2022 12.2 (H) 4.0 - 5.6 % Final     Comment:     ADA Screening Guidelines:  5.7-6.4%  Consistent with prediabetes  >or=6.5%  Consistent with diabetes    High levels of fetal hemoglobin interfere with the HbA1C  assay. Heterozygous hemoglobin variants (HbS, HgC, etc)do  not significantly interfere with this assay.   However, presence of multiple variants may affect accuracy.     12/22/2020 10.5 (H) 4.0 - 5.6 % Final     Comment:     ADA Screening Guidelines:  5.7-6.4%  Consistent with prediabetes  >or=6.5%  Consistent with diabetes  High levels of fetal hemoglobin interfere with the  HbA1C  assay. Heterozygous hemoglobin variants (HbS, HgC, etc)do  not significantly interfere with this assay.   However, presence of multiple variants may affect accuracy.     10/13/2020 10.8 (H) 4.0 - 5.6 % Final     Comment:     ADA Screening Guidelines:  5.7-6.4%  Consistent with prediabetes  >or=6.5%  Consistent with diabetes  High levels of fetal hemoglobin interfere with the HbA1C  assay. Heterozygous hemoglobin variants (HbS, HgC, etc)do  not significantly interfere with this assay.   However, presence of multiple variants may affect accuracy.         Review of Systems   Constitutional: Negative for chills, fever, malaise/fatigue and night sweats.   Cardiovascular: Negative for claudication, cyanosis and leg swelling.   Respiratory: Negative for cough, shortness of breath and wheezing.    Skin: Positive for poor wound healing. Negative for color change, itching, rash and suspicious lesions.   Musculoskeletal: Negative for falls, gout, joint pain and myalgias.   Gastrointestinal: Negative for nausea and vomiting.   Neurological: Positive for numbness and sensory change. Negative for paresthesias.           Objective:        Physical Exam  Constitutional:       General: He is not in acute distress.     Appearance: Normal appearance.   Cardiovascular:      Pulses:           Dorsalis pedis pulses are 2+ on the right side and 2+ on the left side.        Posterior tibial pulses are 2+ on the right side and 2+ on the left side.      Comments: CFT< 3 secs all toes bilateral foot, skin temp warm to cool bilateral foot, no hair growth bilateral lower extremity, no edema to bilateral lower extremities    Musculoskeletal:         General: No swelling.      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     5/5 muscle strength with DF/PF/Inv/Ev bilateral.    Inspection and palpation of the joints and bones reveal no crepitus or joint effusion. No tenderness upon palpation.  Angle  and base of gait are normal.    Skin:     General: Skin is warm and dry.      Capillary Refill: Capillary refill takes 2 to 3 seconds.      Findings: No erythema.      Comments: Ulceration location: left foot plantar 1st ray amputation stump measuring 1.0 x 0.5 x 0.3 cm (previous measurement: 1.5x0.8x0.5cm) , improved since last visit  Signs of infection: none. No malodor, no erythema  Drainage: Serosanguinous  Purulence: no  Crepitus/fluctuance: no  Periwound: Macerated, Calloused  Base: Granular  Depth: subcutaneous tissue  Probe to bone: no    Ulceration location:Right plantar 2nd metatarsal head measuring 1.5 x 1.0 x 0.4 cm  Signs of infection: none. No malodor, no erythema  Drainage: Serosanguinous  Purulence: no  Crepitus/fluctuance: no  Periwound: Macerated, Calloused  Base: Granular  Depth: subcutaneous tissue  Probe to bone: no   Neurological:      Mental Status: He is alert and oriented to person, place, and time.      Sensory: Sensory deficit present.      Motor: No weakness.      Comments: Diminished/loss of protective sensation all toes bilateral to 10 gram monofilament.   Psychiatric:         Mood and Affect: Mood normal.         Thought Content: Thought content normal.               Assessment:       Encounter Diagnoses   Name Primary?    Ulcer of left foot, with fat layer exposed Yes    Ulcer of right foot with fat layer exposed     Type II diabetes mellitus with neurological manifestations     Amputation of toe of left foot          Plan:       Billy was seen today for wound care.    Diagnoses and all orders for this visit:    Ulcer of left foot, with fat layer exposed    Ulcer of right foot with fat layer exposed    Type II diabetes mellitus with neurological manifestations    Amputation of toe of left foot    Other orders  -     Debridement      I counseled the patient on his conditions, their implications and medical management.    Education about the prevention of limb loss.     Discussed  wound healing cycle, skin integrity, ways to care for skin.Counseled patient on the effects of high blood glucose on healing. He verbalizes understanding that it can increase the chances of delayed healing and this prolonged exposure leads to infection or progression of infection which subsequently can result in loss of limb.     Adequate vitamin supplementation, protein intake, and hydration - discussed with patient    Debridement    Date/Time: 4/11/2022 11:49 AM  Performed by: Mingo Melara DPM  Authorized by: Mingo Melara DPM       Wound Details:    Location:  Left foot    Location:  Left Plantar    Type of Debridement:  Excisional       Length (cm):  1       Area (sq cm):  0.5       Width (cm):  0.5       Percent Debrided (%):  100       Depth (cm):  0.3       Total Area Debrided (sq cm):  0.5    Depth of debridement:  Subcutaneous tissue    Tissue debrided:  Epidermis, Dermis and Subcutaneous    Devitalized tissue debrided:  Biofilm, Callus, Exudate, Fibrin and Slough    Instruments:  Curette    2nd Wound Details:     Location:  Right foot    Location:  Right 2nd Metatarsal Head    Location:  Right 2nd Metatarsal Head    Type of Debridement:  Excisional       Length (cm):  1.5       Area (sq cm):  1.5       Width (cm):  1       Percent Debrided (%):  100       Depth (cm):  0.4       Total Area Debrided (sq cm):  1.5    Depth of debridement:  Subcutaneous tissue    Tissue debrided:  Epidermis, Dermis and Subcutaneous    Devitalized tissue debrided:  Biofilm, Callus, Exudate, Fibrin and Slough    Instruments:  Curette    Bleeding:  Minimal  Hemostasis Achieved: Yes    Method Used:  Pressure  Patient tolerance:  Patient tolerated the procedure well with no immediate complications     Bilateral foot wounds packed with flor followed by mepilex border left foot, 4x4 gauze, svitlana, coban right foot.       The nature of the condition, options for management, as well as potential risks and complications were  discussed in detail with patient. Patient was amenable to my recommendations and left my office fully informed and will follow up as instructed or sooner if necessary.       Wound care discussed in detail.  Continue Iodosorb and Leticia dressing every other day with dry sterile dressing bilateral foot.  Regular tennis shoe on the left, DARCO shoe on the right. Follow-up in 2 weeks for wound check, sooner prn.

## 2022-04-25 ENCOUNTER — PATIENT OUTREACH (OUTPATIENT)
Dept: ADMINISTRATIVE | Facility: OTHER | Age: 57
End: 2022-04-25
Payer: COMMERCIAL

## 2022-04-25 DIAGNOSIS — E11.9 TYPE 2 DIABETES MELLITUS WITHOUT COMPLICATION, WITH LONG-TERM CURRENT USE OF INSULIN: Primary | ICD-10-CM

## 2022-04-25 DIAGNOSIS — Z79.4 TYPE 2 DIABETES MELLITUS WITHOUT COMPLICATION, WITH LONG-TERM CURRENT USE OF INSULIN: Primary | ICD-10-CM

## 2022-04-25 NOTE — PROGRESS NOTES
[facility] immunization registry updated  Care Everywhere updated  Health Maintenance updated  Chart reviewed for overdue Proactive [facility] Encounters ([facility]) health maintenance testing (CRS, Breast Ca, Diabetic Eye Exam)   Orders entered: HgA1c

## 2022-04-26 ENCOUNTER — OFFICE VISIT (OUTPATIENT)
Dept: PODIATRY | Facility: CLINIC | Age: 57
End: 2022-04-26
Payer: COMMERCIAL

## 2022-04-26 VITALS
DIASTOLIC BLOOD PRESSURE: 87 MMHG | SYSTOLIC BLOOD PRESSURE: 176 MMHG | HEART RATE: 64 BPM | WEIGHT: 282.88 LBS | BODY MASS INDEX: 36.32 KG/M2

## 2022-04-26 DIAGNOSIS — E11.49 TYPE II DIABETES MELLITUS WITH NEUROLOGICAL MANIFESTATIONS: ICD-10-CM

## 2022-04-26 DIAGNOSIS — L97.512 ULCER OF RIGHT FOOT WITH FAT LAYER EXPOSED: Primary | ICD-10-CM

## 2022-04-26 PROCEDURE — 1159F MED LIST DOCD IN RCRD: CPT | Mod: CPTII,S$GLB,, | Performed by: PODIATRIST

## 2022-04-26 PROCEDURE — 3008F BODY MASS INDEX DOCD: CPT | Mod: CPTII,S$GLB,, | Performed by: PODIATRIST

## 2022-04-26 PROCEDURE — 11042 DBRDMT SUBQ TIS 1ST 20SQCM/<: CPT | Mod: RT,S$GLB,, | Performed by: PODIATRIST

## 2022-04-26 PROCEDURE — 3008F PR BODY MASS INDEX (BMI) DOCUMENTED: ICD-10-PCS | Mod: CPTII,S$GLB,, | Performed by: PODIATRIST

## 2022-04-26 PROCEDURE — 4010F ACE/ARB THERAPY RXD/TAKEN: CPT | Mod: CPTII,S$GLB,, | Performed by: PODIATRIST

## 2022-04-26 PROCEDURE — 99213 OFFICE O/P EST LOW 20 MIN: CPT | Mod: 25,S$GLB,, | Performed by: PODIATRIST

## 2022-04-26 PROCEDURE — 3066F PR DOCUMENTATION OF TREATMENT FOR NEPHROPATHY: ICD-10-PCS | Mod: CPTII,S$GLB,, | Performed by: PODIATRIST

## 2022-04-26 PROCEDURE — 3046F HEMOGLOBIN A1C LEVEL >9.0%: CPT | Mod: CPTII,S$GLB,, | Performed by: PODIATRIST

## 2022-04-26 PROCEDURE — 3079F DIAST BP 80-89 MM HG: CPT | Mod: CPTII,S$GLB,, | Performed by: PODIATRIST

## 2022-04-26 PROCEDURE — 3079F PR MOST RECENT DIASTOLIC BLOOD PRESSURE 80-89 MM HG: ICD-10-PCS | Mod: CPTII,S$GLB,, | Performed by: PODIATRIST

## 2022-04-26 PROCEDURE — 99999 PR PBB SHADOW E&M-EST. PATIENT-LVL III: CPT | Mod: PBBFAC,,, | Performed by: PODIATRIST

## 2022-04-26 PROCEDURE — 99999 PR PBB SHADOW E&M-EST. PATIENT-LVL III: ICD-10-PCS | Mod: PBBFAC,,, | Performed by: PODIATRIST

## 2022-04-26 PROCEDURE — 4010F PR ACE/ARB THEARPY RXD/TAKEN: ICD-10-PCS | Mod: CPTII,S$GLB,, | Performed by: PODIATRIST

## 2022-04-26 PROCEDURE — 99213 PR OFFICE/OUTPT VISIT, EST, LEVL III, 20-29 MIN: ICD-10-PCS | Mod: 25,S$GLB,, | Performed by: PODIATRIST

## 2022-04-26 PROCEDURE — 3077F SYST BP >= 140 MM HG: CPT | Mod: CPTII,S$GLB,, | Performed by: PODIATRIST

## 2022-04-26 PROCEDURE — 3077F PR MOST RECENT SYSTOLIC BLOOD PRESSURE >= 140 MM HG: ICD-10-PCS | Mod: CPTII,S$GLB,, | Performed by: PODIATRIST

## 2022-04-26 PROCEDURE — 3066F NEPHROPATHY DOC TX: CPT | Mod: CPTII,S$GLB,, | Performed by: PODIATRIST

## 2022-04-26 PROCEDURE — 1159F PR MEDICATION LIST DOCUMENTED IN MEDICAL RECORD: ICD-10-PCS | Mod: CPTII,S$GLB,, | Performed by: PODIATRIST

## 2022-04-26 PROCEDURE — 11042 PR DEBRIDEMENT, SKIN, SUB-Q TISSUE,=<20 SQ CM: ICD-10-PCS | Mod: RT,S$GLB,, | Performed by: PODIATRIST

## 2022-04-26 PROCEDURE — 3062F POS MACROALBUMINURIA REV: CPT | Mod: CPTII,S$GLB,, | Performed by: PODIATRIST

## 2022-04-26 PROCEDURE — 3062F PR POS MACROALBUMINURIA RESULT DOCUMENTED/REVIEW: ICD-10-PCS | Mod: CPTII,S$GLB,, | Performed by: PODIATRIST

## 2022-04-26 PROCEDURE — 3046F PR MOST RECENT HEMOGLOBIN A1C LEVEL > 9.0%: ICD-10-PCS | Mod: CPTII,S$GLB,, | Performed by: PODIATRIST

## 2022-04-28 NOTE — PROGRESS NOTES
Subjective:      Patient ID: Billy Rivera is a 56 y.o. male.    Chief Complaint: Wound Care (Ulcer on both )    Billy is a 56 y.o. male who presents to the clinic for evaluation and treatment of high risk feet. Billy has a past medical history of Allergy, CAD (coronary artery disease), native coronary artery (6/25/2013), Cancer, COVID-19 virus detected (9/1/2020), Diabetes mellitus, Diabetes mellitus, type 2, Disorder of kidney and ureter, Heart attack (04/2012), colon cancer, stage I, Hyperlipidemia, Hypertension, Muscular pain, NSTEMI May 2013 - peak troponin 0.22 (5/22/2013), MICHAELA (obstructive sleep apnea), and Retinopathy due to secondary diabetes. The patient's chief complaint is foot ulcer    Patient presents to clinic for 2 week wound care check to bilateral feet. He has been ambulating in a tennis shoe on the left foot, DARCO shoe on the right foot. He has been changing the wounds on both feet and alternating between iodosorb and medihoney. Denies any increased drainage, redness, or malodor. Denies nausea, vomiting, fever, or chills.      no new complaints today.    PCP: BRAD Baker MD    Date Last Seen by PCP: 3/23/2022    Current shoe gear:  Affected Foot: DARCO Shoe right foot     Affected Foot: Tennis shoe left foot    History of Trauma: negative  Sign of Infection: none    Hemoglobin A1C   Date Value Ref Range Status   01/26/2022 12.2 (H) 4.0 - 5.6 % Final     Comment:     ADA Screening Guidelines:  5.7-6.4%  Consistent with prediabetes  >or=6.5%  Consistent with diabetes    High levels of fetal hemoglobin interfere with the HbA1C  assay. Heterozygous hemoglobin variants (HbS, HgC, etc)do  not significantly interfere with this assay.   However, presence of multiple variants may affect accuracy.     12/22/2020 10.5 (H) 4.0 - 5.6 % Final     Comment:     ADA Screening Guidelines:  5.7-6.4%  Consistent with prediabetes  >or=6.5%  Consistent with diabetes  High levels of fetal hemoglobin  interfere with the HbA1C  assay. Heterozygous hemoglobin variants (HbS, HgC, etc)do  not significantly interfere with this assay.   However, presence of multiple variants may affect accuracy.     10/13/2020 10.8 (H) 4.0 - 5.6 % Final     Comment:     ADA Screening Guidelines:  5.7-6.4%  Consistent with prediabetes  >or=6.5%  Consistent with diabetes  High levels of fetal hemoglobin interfere with the HbA1C  assay. Heterozygous hemoglobin variants (HbS, HgC, etc)do  not significantly interfere with this assay.   However, presence of multiple variants may affect accuracy.         Review of Systems   Constitutional: Negative for chills, fever, malaise/fatigue and night sweats.   Cardiovascular: Negative for claudication, cyanosis and leg swelling.   Respiratory: Negative for cough, shortness of breath and wheezing.    Skin: Positive for poor wound healing. Negative for color change, itching, rash and suspicious lesions.   Musculoskeletal: Negative for falls, gout, joint pain and myalgias.   Gastrointestinal: Negative for nausea and vomiting.   Neurological: Positive for numbness and sensory change. Negative for paresthesias.           Objective:        Physical Exam  Constitutional:       General: He is not in acute distress.     Appearance: Normal appearance.   Cardiovascular:      Pulses:           Dorsalis pedis pulses are 2+ on the right side and 2+ on the left side.        Posterior tibial pulses are 2+ on the right side and 2+ on the left side.      Comments: CFT< 3 secs all toes bilateral foot, skin temp warm to cool bilateral foot, no hair growth bilateral lower extremity, no edema to bilateral lower extremities    Musculoskeletal:         General: No swelling.      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     5/5 muscle strength with DF/PF/Inv/Ev bilateral.    Inspection and palpation of the joints and bones reveal no crepitus or joint effusion. No tenderness  upon palpation.  Angle and base of gait are normal.    Skin:     General: Skin is warm and dry.      Capillary Refill: Capillary refill takes 2 to 3 seconds.      Findings: No erythema.      Comments: Ulceration location: left foot plantar 1st ray amputation stump measuring 0.5 x 0.5 x 0.1 cm, improved since last visit  Signs of infection: none. No malodor, no erythema  Drainage: Serosanguinous  Purulence: no  Crepitus/fluctuance: no  Periwound: Macerated, Calloused  Base: Granular  Depth: subcutaneous tissue  Probe to bone: no    Ulceration location:Right plantar 2nd metatarsal head measuring 1.0 x 1.0 x 0.3cm  Signs of infection: none. No malodor, no erythema  Drainage: Serosanguinous  Purulence: no  Crepitus/fluctuance: no  Periwound: Macerated, Calloused  Base: Granular  Depth: subcutaneous tissue  Probe to bone: no   Neurological:      Mental Status: He is alert and oriented to person, place, and time.      Sensory: Sensory deficit present.      Motor: No weakness.      Comments: Diminished/loss of protective sensation all toes bilateral to 10 gram monofilament.   Psychiatric:         Mood and Affect: Mood normal.         Thought Content: Thought content normal.               Assessment:       Encounter Diagnoses   Name Primary?    Ulcer of right foot with fat layer exposed Yes    Type II diabetes mellitus with neurological manifestations          Plan:       Billy was seen today for wound care.    Diagnoses and all orders for this visit:    Ulcer of right foot with fat layer exposed    Type II diabetes mellitus with neurological manifestations      I counseled the patient on his conditions, their implications and medical management.    Education about the prevention of limb loss.     Discussed wound healing cycle, skin integrity, ways to care for skin.Counseled patient on the effects of high blood glucose on healing. He verbalizes understanding that it can increase the chances of delayed healing and this  "prolonged exposure leads to infection or progression of infection which subsequently can result in loss of limb.     Adequate vitamin supplementation, protein intake, and hydration - discussed with patient    Procedures     Wound Debridement    Performed by: Mingo Melara DPM   Authorized by: Patient    Time out: Immediately prior to procedure a "time out" was called to verify the correct patient, procedure, equipment, support staff and site/side marked as required.   Consent Done?:  Yes (Verbal)  Local anesthesia used?: No       Wound Details:    Location:    Right foot     Type of Debridement:  Excisional       Length (cm):   1.0       Width (cm):   0.3       Depth (cm):   0.3       Percent Debrided (%):  100             Depth of debridement:  Subcutaneous tissue    Tissue debrided:  Dermis, Epidermis and Subcutaneous    Devitalized tissue debrided:  Biofilm, Callus and Necrotic/Eschar    Instruments:  Blade, Curette and Nippers     Bleeding:  Minimal  Hemostasis Achieved: Yes    Method Used:  Pressure  Patient tolerance:  Patient tolerated the procedure well with no immediate complications    The nature of the condition, options for management, as well as potential risks and complications were discussed in detail with patient. Patient was amenable to my recommendations and left my office fully informed and will follow up as instructed or sooner if necessary.       Wound care discussed in detail.  Continue Iodosorb and Leticia dressing every other day with dry sterile dressing bilateral foot.  Regular tennis shoe on the left, DARCO shoe on the right. Follow-up in 2 weeks for wound check, sooner prn.                "

## 2022-05-10 ENCOUNTER — OFFICE VISIT (OUTPATIENT)
Dept: PODIATRY | Facility: CLINIC | Age: 57
End: 2022-05-10
Payer: COMMERCIAL

## 2022-05-10 VITALS
DIASTOLIC BLOOD PRESSURE: 70 MMHG | HEART RATE: 50 BPM | BODY MASS INDEX: 36.32 KG/M2 | SYSTOLIC BLOOD PRESSURE: 116 MMHG | WEIGHT: 282.88 LBS

## 2022-05-10 DIAGNOSIS — L97.522 ULCER OF LEFT FOOT, WITH FAT LAYER EXPOSED: ICD-10-CM

## 2022-05-10 DIAGNOSIS — E11.49 TYPE II DIABETES MELLITUS WITH NEUROLOGICAL MANIFESTATIONS: ICD-10-CM

## 2022-05-10 DIAGNOSIS — L97.512 ULCER OF RIGHT FOOT WITH FAT LAYER EXPOSED: Primary | ICD-10-CM

## 2022-05-10 PROCEDURE — 99999 PR PBB SHADOW E&M-EST. PATIENT-LVL III: CPT | Mod: PBBFAC,,, | Performed by: PODIATRIST

## 2022-05-10 PROCEDURE — 3062F POS MACROALBUMINURIA REV: CPT | Mod: CPTII,S$GLB,, | Performed by: PODIATRIST

## 2022-05-10 PROCEDURE — 1159F PR MEDICATION LIST DOCUMENTED IN MEDICAL RECORD: ICD-10-PCS | Mod: CPTII,S$GLB,, | Performed by: PODIATRIST

## 2022-05-10 PROCEDURE — 3074F SYST BP LT 130 MM HG: CPT | Mod: CPTII,S$GLB,, | Performed by: PODIATRIST

## 2022-05-10 PROCEDURE — 3062F PR POS MACROALBUMINURIA RESULT DOCUMENTED/REVIEW: ICD-10-PCS | Mod: CPTII,S$GLB,, | Performed by: PODIATRIST

## 2022-05-10 PROCEDURE — 11042 DBRDMT SUBQ TIS 1ST 20SQCM/<: CPT | Mod: S$GLB,,, | Performed by: PODIATRIST

## 2022-05-10 PROCEDURE — 3066F PR DOCUMENTATION OF TREATMENT FOR NEPHROPATHY: ICD-10-PCS | Mod: CPTII,S$GLB,, | Performed by: PODIATRIST

## 2022-05-10 PROCEDURE — 99213 OFFICE O/P EST LOW 20 MIN: CPT | Mod: 25,S$GLB,, | Performed by: PODIATRIST

## 2022-05-10 PROCEDURE — 11042 PR DEBRIDEMENT, SKIN, SUB-Q TISSUE,=<20 SQ CM: ICD-10-PCS | Mod: S$GLB,,, | Performed by: PODIATRIST

## 2022-05-10 PROCEDURE — 3008F BODY MASS INDEX DOCD: CPT | Mod: CPTII,S$GLB,, | Performed by: PODIATRIST

## 2022-05-10 PROCEDURE — 3046F HEMOGLOBIN A1C LEVEL >9.0%: CPT | Mod: CPTII,S$GLB,, | Performed by: PODIATRIST

## 2022-05-10 PROCEDURE — 3008F PR BODY MASS INDEX (BMI) DOCUMENTED: ICD-10-PCS | Mod: CPTII,S$GLB,, | Performed by: PODIATRIST

## 2022-05-10 PROCEDURE — 4010F ACE/ARB THERAPY RXD/TAKEN: CPT | Mod: CPTII,S$GLB,, | Performed by: PODIATRIST

## 2022-05-10 PROCEDURE — 3078F PR MOST RECENT DIASTOLIC BLOOD PRESSURE < 80 MM HG: ICD-10-PCS | Mod: CPTII,S$GLB,, | Performed by: PODIATRIST

## 2022-05-10 PROCEDURE — 99999 PR PBB SHADOW E&M-EST. PATIENT-LVL III: ICD-10-PCS | Mod: PBBFAC,,, | Performed by: PODIATRIST

## 2022-05-10 PROCEDURE — 99213 PR OFFICE/OUTPT VISIT, EST, LEVL III, 20-29 MIN: ICD-10-PCS | Mod: 25,S$GLB,, | Performed by: PODIATRIST

## 2022-05-10 PROCEDURE — 3078F DIAST BP <80 MM HG: CPT | Mod: CPTII,S$GLB,, | Performed by: PODIATRIST

## 2022-05-10 PROCEDURE — 1159F MED LIST DOCD IN RCRD: CPT | Mod: CPTII,S$GLB,, | Performed by: PODIATRIST

## 2022-05-10 PROCEDURE — 4010F PR ACE/ARB THEARPY RXD/TAKEN: ICD-10-PCS | Mod: CPTII,S$GLB,, | Performed by: PODIATRIST

## 2022-05-10 PROCEDURE — 3074F PR MOST RECENT SYSTOLIC BLOOD PRESSURE < 130 MM HG: ICD-10-PCS | Mod: CPTII,S$GLB,, | Performed by: PODIATRIST

## 2022-05-10 PROCEDURE — 3046F PR MOST RECENT HEMOGLOBIN A1C LEVEL > 9.0%: ICD-10-PCS | Mod: CPTII,S$GLB,, | Performed by: PODIATRIST

## 2022-05-10 PROCEDURE — 3066F NEPHROPATHY DOC TX: CPT | Mod: CPTII,S$GLB,, | Performed by: PODIATRIST

## 2022-05-20 NOTE — PROGRESS NOTES
Subjective:      Patient ID: Billy Rivera is a 56 y.o. male.    Chief Complaint: Wound Care (On both feet )    Billy is a 56 y.o. male who presents to the clinic for evaluation and treatment of high risk feet. Billy has a past medical history of Allergy, CAD (coronary artery disease), native coronary artery (6/25/2013), Cancer, COVID-19 virus detected (9/1/2020), Diabetes mellitus, Diabetes mellitus, type 2, Disorder of kidney and ureter, Heart attack (04/2012), colon cancer, stage I, Hyperlipidemia, Hypertension, Muscular pain, NSTEMI May 2013 - peak troponin 0.22 (5/22/2013), MICHAELA (obstructive sleep apnea), and Retinopathy due to secondary diabetes. The patient's chief complaint is foot ulcer    Patient presents to clinic for 2 week wound care check to bilateral feet. He has been ambulating in a tennis shoe on the left foot, DARCO shoe on the right foot. He has been changing the wounds on both feet and alternating between iodosorb and medihoney. Denies any increased drainage, redness, or malodor. Denies nausea, vomiting, fever, or chills.      no new complaints today.    PCP: BRAD Bakre MD    Date Last Seen by PCP: 3/23/2022    Current shoe gear:  Affected Foot: DARCO Shoe right foot     Affected Foot: Tennis shoe left foot    History of Trauma: negative  Sign of Infection: none    Hemoglobin A1C   Date Value Ref Range Status   01/26/2022 12.2 (H) 4.0 - 5.6 % Final     Comment:     ADA Screening Guidelines:  5.7-6.4%  Consistent with prediabetes  >or=6.5%  Consistent with diabetes    High levels of fetal hemoglobin interfere with the HbA1C  assay. Heterozygous hemoglobin variants (HbS, HgC, etc)do  not significantly interfere with this assay.   However, presence of multiple variants may affect accuracy.     12/22/2020 10.5 (H) 4.0 - 5.6 % Final     Comment:     ADA Screening Guidelines:  5.7-6.4%  Consistent with prediabetes  >or=6.5%  Consistent with diabetes  High levels of fetal hemoglobin  interfere with the HbA1C  assay. Heterozygous hemoglobin variants (HbS, HgC, etc)do  not significantly interfere with this assay.   However, presence of multiple variants may affect accuracy.     10/13/2020 10.8 (H) 4.0 - 5.6 % Final     Comment:     ADA Screening Guidelines:  5.7-6.4%  Consistent with prediabetes  >or=6.5%  Consistent with diabetes  High levels of fetal hemoglobin interfere with the HbA1C  assay. Heterozygous hemoglobin variants (HbS, HgC, etc)do  not significantly interfere with this assay.   However, presence of multiple variants may affect accuracy.         Review of Systems   Constitutional: Negative for chills, fever, malaise/fatigue and night sweats.   Cardiovascular: Negative for claudication, cyanosis and leg swelling.   Respiratory: Negative for cough, shortness of breath and wheezing.    Skin: Positive for poor wound healing. Negative for color change, itching, rash and suspicious lesions.   Musculoskeletal: Negative for falls, gout, joint pain and myalgias.   Gastrointestinal: Negative for nausea and vomiting.   Neurological: Positive for numbness and sensory change. Negative for paresthesias.           Objective:        Physical Exam  Constitutional:       General: He is not in acute distress.     Appearance: Normal appearance.   Cardiovascular:      Pulses:           Dorsalis pedis pulses are 2+ on the right side and 2+ on the left side.        Posterior tibial pulses are 2+ on the right side and 2+ on the left side.      Comments: CFT< 3 secs all toes bilateral foot, skin temp warm to cool bilateral foot, no hair growth bilateral lower extremity, no edema to bilateral lower extremities    Musculoskeletal:         General: No swelling.      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     5/5 muscle strength with DF/PF/Inv/Ev bilateral.    Inspection and palpation of the joints and bones reveal no crepitus or joint effusion. No tenderness  upon palpation.  Angle and base of gait are normal.    Skin:     General: Skin is warm and dry.      Capillary Refill: Capillary refill takes 2 to 3 seconds.      Findings: No erythema.      Comments: Ulceration location: left foot plantar 1st ray amputation stump measuring 0.2 x 0.2 x 0.1 cm, improved since last visit  Signs of infection: none. No malodor, no erythema  Drainage: Serosanguinous  Purulence: no  Crepitus/fluctuance: no  Periwound: Macerated, Calloused  Base: Granular  Depth: subcutaneous tissue  Probe to bone: no    Ulceration location:Right plantar 2nd metatarsal head measuring 1.0 x 0.7 x 0.2cm  Signs of infection: none. No malodor, no erythema  Drainage: Serosanguinous  Purulence: no  Crepitus/fluctuance: no  Periwound: Macerated, Calloused  Base: Granular  Depth: subcutaneous tissue  Probe to bone: no   Neurological:      Mental Status: He is alert and oriented to person, place, and time.      Sensory: Sensory deficit present.      Motor: No weakness.      Comments: Diminished/loss of protective sensation all toes bilateral to 10 gram monofilament.   Psychiatric:         Mood and Affect: Mood normal.         Thought Content: Thought content normal.               Assessment:       Encounter Diagnoses   Name Primary?    Ulcer of right foot with fat layer exposed Yes    Ulcer of left foot, with fat layer exposed     Type II diabetes mellitus with neurological manifestations          Plan:       Billy was seen today for wound care.    Diagnoses and all orders for this visit:    Ulcer of right foot with fat layer exposed    Ulcer of left foot, with fat layer exposed    Type II diabetes mellitus with neurological manifestations      I counseled the patient on his conditions, their implications and medical management.    Education about the prevention of limb loss.     Discussed wound healing cycle, skin integrity, ways to care for skin.Counseled patient on the effects of high blood glucose on  "healing. He verbalizes understanding that it can increase the chances of delayed healing and this prolonged exposure leads to infection or progression of infection which subsequently can result in loss of limb.     Adequate vitamin supplementation, protein intake, and hydration - discussed with patient    Procedures     Wound Debridement    Performed by: Mingo Melara DPM   Authorized by: Patient    Time out: Immediately prior to procedure a "time out" was called to verify the correct patient, procedure, equipment, support staff and site/side marked as required.   Consent Done?:  Yes (Verbal)  Local anesthesia used?: No       Wound Details:    Location:    Right foot     Type of Debridement:  Excisional       Length (cm):   1.0       Width (cm):   0.7       Depth (cm):   0.2       Percent Debrided (%):  100             Depth of debridement:  Subcutaneous tissue    Tissue debrided:  Dermis, Epidermis and Subcutaneous    Devitalized tissue debrided:  Biofilm, Callus and Necrotic/Eschar    Instruments:  Blade, Curette and Nippers     Bleeding:  Minimal  Hemostasis Achieved: Yes    Method Used:  Pressure  Patient tolerance:  Patient tolerated the procedure well with no immediate complications    The nature of the condition, options for management, as well as potential risks and complications were discussed in detail with patient. Patient was amenable to my recommendations and left my office fully informed and will follow up as instructed or sooner if necessary.       Wound care discussed in detail.  Continue Iodosorb and Leticia dressing every other day with dry sterile dressing bilateral foot.  Regular tennis shoe on the left, DARCO shoe on the right. Follow-up in 2 weeks for wound check, sooner prn.                  "

## 2022-05-25 ENCOUNTER — OFFICE VISIT (OUTPATIENT)
Dept: PODIATRY | Facility: CLINIC | Age: 57
End: 2022-05-25
Payer: COMMERCIAL

## 2022-05-25 VITALS
SYSTOLIC BLOOD PRESSURE: 148 MMHG | WEIGHT: 282.88 LBS | DIASTOLIC BLOOD PRESSURE: 82 MMHG | HEART RATE: 67 BPM | BODY MASS INDEX: 36.32 KG/M2

## 2022-05-25 DIAGNOSIS — E11.49 TYPE II DIABETES MELLITUS WITH NEUROLOGICAL MANIFESTATIONS: ICD-10-CM

## 2022-05-25 DIAGNOSIS — L97.512 ULCER OF RIGHT FOOT WITH FAT LAYER EXPOSED: Primary | ICD-10-CM

## 2022-05-25 PROCEDURE — 1159F PR MEDICATION LIST DOCUMENTED IN MEDICAL RECORD: ICD-10-PCS | Mod: CPTII,S$GLB,, | Performed by: PODIATRIST

## 2022-05-25 PROCEDURE — 4010F ACE/ARB THERAPY RXD/TAKEN: CPT | Mod: CPTII,S$GLB,, | Performed by: PODIATRIST

## 2022-05-25 PROCEDURE — 11043 DBRDMT MUSC&/FSCA 1ST 20/<: CPT | Mod: RT,S$GLB,, | Performed by: PODIATRIST

## 2022-05-25 PROCEDURE — 3077F SYST BP >= 140 MM HG: CPT | Mod: CPTII,S$GLB,, | Performed by: PODIATRIST

## 2022-05-25 PROCEDURE — 3046F HEMOGLOBIN A1C LEVEL >9.0%: CPT | Mod: CPTII,S$GLB,, | Performed by: PODIATRIST

## 2022-05-25 PROCEDURE — 3046F PR MOST RECENT HEMOGLOBIN A1C LEVEL > 9.0%: ICD-10-PCS | Mod: CPTII,S$GLB,, | Performed by: PODIATRIST

## 2022-05-25 PROCEDURE — 1159F MED LIST DOCD IN RCRD: CPT | Mod: CPTII,S$GLB,, | Performed by: PODIATRIST

## 2022-05-25 PROCEDURE — 3066F PR DOCUMENTATION OF TREATMENT FOR NEPHROPATHY: ICD-10-PCS | Mod: CPTII,S$GLB,, | Performed by: PODIATRIST

## 2022-05-25 PROCEDURE — 99213 PR OFFICE/OUTPT VISIT, EST, LEVL III, 20-29 MIN: ICD-10-PCS | Mod: 25,S$GLB,, | Performed by: PODIATRIST

## 2022-05-25 PROCEDURE — 99213 OFFICE O/P EST LOW 20 MIN: CPT | Mod: 25,S$GLB,, | Performed by: PODIATRIST

## 2022-05-25 PROCEDURE — 3008F BODY MASS INDEX DOCD: CPT | Mod: CPTII,S$GLB,, | Performed by: PODIATRIST

## 2022-05-25 PROCEDURE — 4010F PR ACE/ARB THEARPY RXD/TAKEN: ICD-10-PCS | Mod: CPTII,S$GLB,, | Performed by: PODIATRIST

## 2022-05-25 PROCEDURE — 3062F PR POS MACROALBUMINURIA RESULT DOCUMENTED/REVIEW: ICD-10-PCS | Mod: CPTII,S$GLB,, | Performed by: PODIATRIST

## 2022-05-25 PROCEDURE — 3008F PR BODY MASS INDEX (BMI) DOCUMENTED: ICD-10-PCS | Mod: CPTII,S$GLB,, | Performed by: PODIATRIST

## 2022-05-25 PROCEDURE — 99999 PR PBB SHADOW E&M-EST. PATIENT-LVL III: CPT | Mod: PBBFAC,,, | Performed by: PODIATRIST

## 2022-05-25 PROCEDURE — 99999 PR PBB SHADOW E&M-EST. PATIENT-LVL III: ICD-10-PCS | Mod: PBBFAC,,, | Performed by: PODIATRIST

## 2022-05-25 PROCEDURE — 3079F DIAST BP 80-89 MM HG: CPT | Mod: CPTII,S$GLB,, | Performed by: PODIATRIST

## 2022-05-25 PROCEDURE — 3066F NEPHROPATHY DOC TX: CPT | Mod: CPTII,S$GLB,, | Performed by: PODIATRIST

## 2022-05-25 PROCEDURE — 11043 PR DEBRIDEMENT, SKIN, SUB-Q TISSUE,MUSCLE,=<20 SQ CM: ICD-10-PCS | Mod: RT,S$GLB,, | Performed by: PODIATRIST

## 2022-05-25 PROCEDURE — 3077F PR MOST RECENT SYSTOLIC BLOOD PRESSURE >= 140 MM HG: ICD-10-PCS | Mod: CPTII,S$GLB,, | Performed by: PODIATRIST

## 2022-05-25 PROCEDURE — 3062F POS MACROALBUMINURIA REV: CPT | Mod: CPTII,S$GLB,, | Performed by: PODIATRIST

## 2022-05-25 PROCEDURE — 3079F PR MOST RECENT DIASTOLIC BLOOD PRESSURE 80-89 MM HG: ICD-10-PCS | Mod: CPTII,S$GLB,, | Performed by: PODIATRIST

## 2022-05-25 NOTE — PROGRESS NOTES
Subjective:      Patient ID: Billy Rivera is a 56 y.o. male.    Chief Complaint: Wound Care (Wound on bilat.)    Billy is a 56 y.o. male who presents to the clinic for evaluation and treatment of high risk feet. Billy has a past medical history of Allergy, CAD (coronary artery disease), native coronary artery (6/25/2013), Cancer, COVID-19 virus detected (9/1/2020), Diabetes mellitus, Diabetes mellitus, type 2, Disorder of kidney and ureter, Heart attack (04/2012), colon cancer, stage I, Hyperlipidemia, Hypertension, Muscular pain, NSTEMI May 2013 - peak troponin 0.22 (5/22/2013), MICHAELA (obstructive sleep apnea), and Retinopathy due to secondary diabetes. The patient's chief complaint is foot ulcer       no new complaints today.    PCP: BRAD Baker MD    Date Last Seen by PCP: 3/23/2022    Current shoe gear:  Affected Foot: DARCO Shoe right foot     Affected Foot: Tennis shoe left foot    History of Trauma: negative  Sign of Infection: none    Hemoglobin A1C   Date Value Ref Range Status   01/26/2022 12.2 (H) 4.0 - 5.6 % Final     Comment:     ADA Screening Guidelines:  5.7-6.4%  Consistent with prediabetes  >or=6.5%  Consistent with diabetes    High levels of fetal hemoglobin interfere with the HbA1C  assay. Heterozygous hemoglobin variants (HbS, HgC, etc)do  not significantly interfere with this assay.   However, presence of multiple variants may affect accuracy.     12/22/2020 10.5 (H) 4.0 - 5.6 % Final     Comment:     ADA Screening Guidelines:  5.7-6.4%  Consistent with prediabetes  >or=6.5%  Consistent with diabetes  High levels of fetal hemoglobin interfere with the HbA1C  assay. Heterozygous hemoglobin variants (HbS, HgC, etc)do  not significantly interfere with this assay.   However, presence of multiple variants may affect accuracy.     10/13/2020 10.8 (H) 4.0 - 5.6 % Final     Comment:     ADA Screening Guidelines:  5.7-6.4%  Consistent with prediabetes  >or=6.5%  Consistent with  diabetes  High levels of fetal hemoglobin interfere with the HbA1C  assay. Heterozygous hemoglobin variants (HbS, HgC, etc)do  not significantly interfere with this assay.   However, presence of multiple variants may affect accuracy.         Review of Systems   Constitutional: Negative for chills, fever, malaise/fatigue and night sweats.   Cardiovascular: Negative for claudication, cyanosis and leg swelling.   Respiratory: Negative for cough, shortness of breath and wheezing.    Skin: Positive for poor wound healing. Negative for color change, itching, rash and suspicious lesions.   Musculoskeletal: Negative for falls, gout, joint pain and myalgias.   Gastrointestinal: Negative for nausea and vomiting.   Neurological: Positive for numbness and sensory change. Negative for paresthesias.           Objective:        Physical Exam  Constitutional:       General: He is not in acute distress.     Appearance: Normal appearance.   Cardiovascular:      Pulses:           Dorsalis pedis pulses are 2+ on the right side and 2+ on the left side.        Posterior tibial pulses are 2+ on the right side and 2+ on the left side.      Comments: CFT< 3 secs all toes bilateral foot, skin temp warm to cool bilateral foot, no hair growth bilateral lower extremity, no edema to bilateral lower extremities    Musculoskeletal:         General: No swelling.      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     5/5 muscle strength with DF/PF/Inv/Ev bilateral.    Inspection and palpation of the joints and bones reveal no crepitus or joint effusion. No tenderness upon palpation.  Angle and base of gait are normal.    Skin:     General: Skin is warm and dry.      Capillary Refill: Capillary refill takes 2 to 3 seconds.      Findings: No erythema.      Comments: Ulceration location: left foot plantar 1st ray amputation stump is healed at this time.  Signs of infection: none. No malodor, no  "erythema  Drainage: Serosanguinous  Purulence: no  Crepitus/fluctuance: no  Periwound: Macerated, Calloused  Base: Granular  Depth: subcutaneous tissue  Probe to bone: no    Ulceration location:Right plantar 2nd metatarsal head measuring 1.0 x 1.0 x 0.3cm  Signs of infection: none. No malodor, no erythema  Drainage: Serosanguinous  Purulence: no  Crepitus/fluctuance: no  Periwound: Macerated, Calloused  Base: Granular  Depth: subcutaneous tissue  Probe to bone: no   Neurological:      Mental Status: He is alert and oriented to person, place, and time.      Sensory: Sensory deficit present.      Motor: No weakness.      Comments: Diminished/loss of protective sensation all toes bilateral to 10 gram monofilament.   Psychiatric:         Mood and Affect: Mood normal.         Thought Content: Thought content normal.               Assessment:       Encounter Diagnoses   Name Primary?    Ulcer of right foot with fat layer exposed Yes    Type II diabetes mellitus with neurological manifestations          Plan:       Billy was seen today for wound care.    Diagnoses and all orders for this visit:    Ulcer of right foot with fat layer exposed    Type II diabetes mellitus with neurological manifestations      I counseled the patient on his conditions, their implications and medical management.    Education about the prevention of limb loss.     Discussed wound healing cycle, skin integrity, ways to care for skin.Counseled patient on the effects of high blood glucose on healing. He verbalizes understanding that it can increase the chances of delayed healing and this prolonged exposure leads to infection or progression of infection which subsequently can result in loss of limb.     Adequate vitamin supplementation, protein intake, and hydration - discussed with patient    Procedures     Wound Debridement    Performed by: Mingo Melara DPM   Authorized by: Patient    Time out: Immediately prior to procedure a "time out" " was called to verify the correct patient, procedure, equipment, support staff and site/side marked as required.   Consent Done?:  Yes (Verbal)  Local anesthesia used?: No       Wound Details:    Location:    Right foot     Type of Debridement:  Excisional       Length (cm):   1.0       Width (cm):   1.0       Depth (cm):   0.3       Percent Debrided (%):  100             Depth of debridement:  Subcutaneous tissue    Tissue debrided:  Dermis, Epidermis and Subcutaneous    Devitalized tissue debrided:  Biofilm, Callus and Necrotic/Eschar    Instruments:  Blade, Curette and Nippers     Bleeding:  Minimal  Hemostasis Achieved: Yes    Method Used:  Pressure  Patient tolerance:  Patient tolerated the procedure well with no immediate complications    The nature of the condition, options for management, as well as potential risks and complications were discussed in detail with patient. Patient was amenable to my recommendations and left my office fully informed and will follow up as instructed or sooner if necessary.       Wound care discussed in detail.  Continue Iodosorb  dressing every other day with dry sterile dressing bilateral foot.  Regular tennis shoe on the left, DARCO shoe on the right. Follow-up in 2 weeks for wound check, sooner prn.

## 2022-05-30 ENCOUNTER — TELEPHONE (OUTPATIENT)
Dept: PODIATRY | Facility: CLINIC | Age: 57
End: 2022-05-30
Payer: COMMERCIAL

## 2022-05-30 NOTE — TELEPHONE ENCOUNTER
Patient was called regarding their upcoming appointment with  on June 9th, 2022.      Patient didn't answer.     Appointment was not rescheduled.      Attempt was made and another will be made later.

## 2022-06-06 ENCOUNTER — TELEPHONE (OUTPATIENT)
Dept: PODIATRY | Facility: CLINIC | Age: 57
End: 2022-06-06
Payer: COMMERCIAL

## 2022-06-06 ENCOUNTER — PATIENT MESSAGE (OUTPATIENT)
Dept: PODIATRY | Facility: CLINIC | Age: 57
End: 2022-06-06
Payer: COMMERCIAL

## 2022-06-07 ENCOUNTER — HOSPITAL ENCOUNTER (INPATIENT)
Facility: HOSPITAL | Age: 57
LOS: 2 days | Discharge: HOME HEALTH | DRG: 617 | End: 2022-06-10
Attending: EMERGENCY MEDICINE | Admitting: STUDENT IN AN ORGANIZED HEALTH CARE EDUCATION/TRAINING PROGRAM
Payer: COMMERCIAL

## 2022-06-07 DIAGNOSIS — S98.132A AMPUTATION OF TOE OF LEFT FOOT: ICD-10-CM

## 2022-06-07 DIAGNOSIS — E11.51 TYPE 2 DIABETES MELLITUS WITH DIABETIC PERIPHERAL ANGIOPATHY WITHOUT GANGRENE, WITH LONG-TERM CURRENT USE OF INSULIN: Chronic | ICD-10-CM

## 2022-06-07 DIAGNOSIS — I73.9 PERIPHERAL ARTERIAL DISEASE: ICD-10-CM

## 2022-06-07 DIAGNOSIS — I25.10 CORONARY ARTERY DISEASE, UNSPECIFIED VESSEL OR LESION TYPE, UNSPECIFIED WHETHER ANGINA PRESENT, UNSPECIFIED WHETHER NATIVE OR TRANSPLANTED HEART: Chronic | ICD-10-CM

## 2022-06-07 DIAGNOSIS — L97.512 ULCER OF RIGHT FOOT WITH FAT LAYER EXPOSED: ICD-10-CM

## 2022-06-07 DIAGNOSIS — R07.9 CHEST PAIN: ICD-10-CM

## 2022-06-07 DIAGNOSIS — L02.611 ABSCESS OF RIGHT FOOT: ICD-10-CM

## 2022-06-07 DIAGNOSIS — M86.171 OTHER ACUTE OSTEOMYELITIS OF RIGHT FOOT: ICD-10-CM

## 2022-06-07 DIAGNOSIS — L08.9 FOOT INFECTION: ICD-10-CM

## 2022-06-07 DIAGNOSIS — Z79.4 TYPE 2 DIABETES MELLITUS WITH DIABETIC PERIPHERAL ANGIOPATHY WITHOUT GANGRENE, WITH LONG-TERM CURRENT USE OF INSULIN: Chronic | ICD-10-CM

## 2022-06-07 DIAGNOSIS — G47.30 SLEEP APNEA, UNSPECIFIED TYPE: Chronic | ICD-10-CM

## 2022-06-07 DIAGNOSIS — Z01.810 PREOP CARDIOVASCULAR EXAM: ICD-10-CM

## 2022-06-07 DIAGNOSIS — M86.9 TOE OSTEOMYELITIS, RIGHT: Primary | ICD-10-CM

## 2022-06-07 PROCEDURE — 99285 PR EMERGENCY DEPT VISIT,LEVEL V: ICD-10-PCS | Mod: CS,,, | Performed by: EMERGENCY MEDICINE

## 2022-06-07 PROCEDURE — 99285 EMERGENCY DEPT VISIT HI MDM: CPT | Mod: CS,,, | Performed by: EMERGENCY MEDICINE

## 2022-06-07 PROCEDURE — 99285 EMERGENCY DEPT VISIT HI MDM: CPT | Mod: 25

## 2022-06-07 NOTE — Clinical Note
Diagnosis: Toe osteomyelitis, right [[id number]]   Admitting Provider:: LEILA JONES [[id number]]   Future Attending Provider: LEILA JONES [[id number]]   Reason for IP Medical Treatment  (Clinical interventions that can only be accomplished in the IP setting? ) :: Acute osteomyelitis   Estimated Length of Stay:: 3-4 midnights   I certify that Inpatient services for greater than or equal to 2 midnights are medically necessary:: Yes   Plans for Post-Acute care--if anticipated (pick the single best option):: A. No post acute care anticipated at this time   Special Needs:: No Special Needs [1]

## 2022-06-08 ENCOUNTER — ANESTHESIA EVENT (OUTPATIENT)
Dept: SURGERY | Facility: HOSPITAL | Age: 57
DRG: 617 | End: 2022-06-08
Payer: COMMERCIAL

## 2022-06-08 PROBLEM — M86.9 OSTEOMYELITIS OF RIGHT FOOT: Status: ACTIVE | Noted: 2022-06-08

## 2022-06-08 PROBLEM — E11.59 HYPERTENSION ASSOCIATED WITH DIABETES: Chronic | Status: ACTIVE | Noted: 2017-03-30

## 2022-06-08 PROBLEM — I15.2 HYPERTENSION ASSOCIATED WITH DIABETES: Chronic | Status: ACTIVE | Noted: 2017-03-30

## 2022-06-08 PROBLEM — Z79.4 TYPE 2 DIABETES MELLITUS WITH DIABETIC PERIPHERAL ANGIOPATHY WITHOUT GANGRENE, WITH LONG-TERM CURRENT USE OF INSULIN: Chronic | Status: ACTIVE | Noted: 2017-04-02

## 2022-06-08 PROBLEM — L97.512 ULCER OF RIGHT FOOT WITH FAT LAYER EXPOSED: Status: ACTIVE | Noted: 2022-06-08

## 2022-06-08 PROBLEM — E11.51 TYPE 2 DIABETES MELLITUS WITH DIABETIC PERIPHERAL ANGIOPATHY WITHOUT GANGRENE, WITH LONG-TERM CURRENT USE OF INSULIN: Chronic | Status: ACTIVE | Noted: 2017-04-02

## 2022-06-08 PROBLEM — I25.10 CAD (CORONARY ARTERY DISEASE): Status: ACTIVE | Noted: 2022-06-08

## 2022-06-08 PROBLEM — I25.10 CAD (CORONARY ARTERY DISEASE): Chronic | Status: ACTIVE | Noted: 2022-06-08

## 2022-06-08 LAB
ABO + RH BLD: NORMAL
ALBUMIN SERPL BCP-MCNC: 2.9 G/DL (ref 3.5–5.2)
ALLENS TEST: ABNORMAL
ALP SERPL-CCNC: 91 U/L (ref 55–135)
ALT SERPL W/O P-5'-P-CCNC: 19 U/L (ref 10–44)
ANION GAP SERPL CALC-SCNC: 12 MMOL/L (ref 8–16)
AST SERPL-CCNC: 17 U/L (ref 10–40)
B-OH-BUTYR BLD STRIP-SCNC: 0.1 MMOL/L (ref 0–0.5)
BASOPHILS # BLD AUTO: 0.04 K/UL (ref 0–0.2)
BASOPHILS NFR BLD: 0.4 % (ref 0–1.9)
BILIRUB SERPL-MCNC: 0.5 MG/DL (ref 0.1–1)
BLD GP AB SCN CELLS X3 SERPL QL: NORMAL
BUN SERPL-MCNC: 26 MG/DL (ref 6–20)
CALCIUM SERPL-MCNC: 8.9 MG/DL (ref 8.7–10.5)
CHLORIDE SERPL-SCNC: 97 MMOL/L (ref 95–110)
CO2 SERPL-SCNC: 20 MMOL/L (ref 23–29)
CREAT SERPL-MCNC: 1.3 MG/DL (ref 0.5–1.4)
CRP SERPL-MCNC: 69.6 MG/L (ref 0–8.2)
CTP QC/QA: YES
DIFFERENTIAL METHOD: ABNORMAL
EOSINOPHIL # BLD AUTO: 0.2 K/UL (ref 0–0.5)
EOSINOPHIL NFR BLD: 2.5 % (ref 0–8)
ERYTHROCYTE [DISTWIDTH] IN BLOOD BY AUTOMATED COUNT: 12.3 % (ref 11.5–14.5)
ERYTHROCYTE [SEDIMENTATION RATE] IN BLOOD BY WESTERGREN METHOD: 78 MM/HR (ref 0–23)
EST. GFR  (AFRICAN AMERICAN): >60 ML/MIN/1.73 M^2
EST. GFR  (NON AFRICAN AMERICAN): >60 ML/MIN/1.73 M^2
ESTIMATED AVG GLUCOSE: 240 MG/DL (ref 68–131)
GLUCOSE SERPL-MCNC: 415 MG/DL (ref 70–110)
GRAM STN SPEC: NORMAL
GRAM STN SPEC: NORMAL
HBA1C MFR BLD: 10 % (ref 4–5.6)
HCO3 UR-SCNC: 27.6 MMOL/L (ref 24–28)
HCT VFR BLD AUTO: 38.9 % (ref 40–54)
HGB BLD-MCNC: 13.6 G/DL (ref 14–18)
IMM GRANULOCYTES # BLD AUTO: 0.07 K/UL (ref 0–0.04)
IMM GRANULOCYTES NFR BLD AUTO: 0.7 % (ref 0–0.5)
LYMPHOCYTES # BLD AUTO: 1.6 K/UL (ref 1–4.8)
LYMPHOCYTES NFR BLD: 16.2 % (ref 18–48)
MCH RBC QN AUTO: 30.4 PG (ref 27–31)
MCHC RBC AUTO-ENTMCNC: 35 G/DL (ref 32–36)
MCV RBC AUTO: 87 FL (ref 82–98)
MONOCYTES # BLD AUTO: 1 K/UL (ref 0.3–1)
MONOCYTES NFR BLD: 9.8 % (ref 4–15)
NEUTROPHILS # BLD AUTO: 6.9 K/UL (ref 1.8–7.7)
NEUTROPHILS NFR BLD: 70.4 % (ref 38–73)
NRBC BLD-RTO: 0 /100 WBC
PCO2 BLDA: 44 MMHG (ref 35–45)
PH SMN: 7.41 [PH] (ref 7.35–7.45)
PLATELET # BLD AUTO: 277 K/UL (ref 150–450)
PMV BLD AUTO: 9.4 FL (ref 9.2–12.9)
PO2 BLDA: 53 MMHG (ref 40–60)
POC BE: 3 MMOL/L
POC SATURATED O2: 87 % (ref 95–100)
POC TCO2: 29 MMOL/L (ref 24–29)
POCT GLUCOSE: 257 MG/DL (ref 70–110)
POCT GLUCOSE: 270 MG/DL (ref 70–110)
POCT GLUCOSE: 290 MG/DL (ref 70–110)
POCT GLUCOSE: 328 MG/DL (ref 70–110)
POCT GLUCOSE: 356 MG/DL (ref 70–110)
POTASSIUM SERPL-SCNC: 4.7 MMOL/L (ref 3.5–5.1)
PROT SERPL-MCNC: 7.2 G/DL (ref 6–8.4)
RBC # BLD AUTO: 4.48 M/UL (ref 4.6–6.2)
SAMPLE: ABNORMAL
SARS-COV-2 RDRP RESP QL NAA+PROBE: NEGATIVE
SITE: ABNORMAL
SODIUM SERPL-SCNC: 129 MMOL/L (ref 136–145)
WBC # BLD AUTO: 9.73 K/UL (ref 3.9–12.7)

## 2022-06-08 PROCEDURE — 11044 DBRDMT BONE 1ST 20 SQ CM/<: CPT | Mod: ,,, | Performed by: PODIATRIST

## 2022-06-08 PROCEDURE — 87206 SMEAR FLUORESCENT/ACID STAI: CPT | Performed by: STUDENT IN AN ORGANIZED HEALTH CARE EDUCATION/TRAINING PROGRAM

## 2022-06-08 PROCEDURE — 87186 SC STD MICRODIL/AGAR DIL: CPT | Mod: 59 | Performed by: STUDENT IN AN ORGANIZED HEALTH CARE EDUCATION/TRAINING PROGRAM

## 2022-06-08 PROCEDURE — 87070 CULTURE OTHR SPECIMN AEROBIC: CPT | Mod: 59 | Performed by: STUDENT IN AN ORGANIZED HEALTH CARE EDUCATION/TRAINING PROGRAM

## 2022-06-08 PROCEDURE — 87205 SMEAR GRAM STAIN: CPT | Performed by: STUDENT IN AN ORGANIZED HEALTH CARE EDUCATION/TRAINING PROGRAM

## 2022-06-08 PROCEDURE — 11042 PR DEBRIDEMENT, SKIN, SUB-Q TISSUE,=<20 SQ CM: ICD-10-PCS | Mod: 59,,, | Performed by: PODIATRIST

## 2022-06-08 PROCEDURE — 96361 HYDRATE IV INFUSION ADD-ON: CPT

## 2022-06-08 PROCEDURE — 87147 CULTURE TYPE IMMUNOLOGIC: CPT | Mod: 59 | Performed by: STUDENT IN AN ORGANIZED HEALTH CARE EDUCATION/TRAINING PROGRAM

## 2022-06-08 PROCEDURE — 99222 1ST HOSP IP/OBS MODERATE 55: CPT | Mod: ,,, | Performed by: NURSE PRACTITIONER

## 2022-06-08 PROCEDURE — 99222 PR INITIAL HOSPITAL CARE,LEVL II: ICD-10-PCS | Mod: ,,, | Performed by: NURSE PRACTITIONER

## 2022-06-08 PROCEDURE — 99233 SBSQ HOSP IP/OBS HIGH 50: CPT | Mod: 25,,, | Performed by: PODIATRIST

## 2022-06-08 PROCEDURE — 11044 DBRDMT BONE 1ST 20 SQ CM/<: CPT | Mod: ,,, | Performed by: STUDENT IN AN ORGANIZED HEALTH CARE EDUCATION/TRAINING PROGRAM

## 2022-06-08 PROCEDURE — 99223 PR INITIAL HOSPITAL CARE,LEVL III: ICD-10-PCS | Mod: ,,, | Performed by: STUDENT IN AN ORGANIZED HEALTH CARE EDUCATION/TRAINING PROGRAM

## 2022-06-08 PROCEDURE — 63600175 PHARM REV CODE 636 W HCPCS

## 2022-06-08 PROCEDURE — 25000003 PHARM REV CODE 250: Performed by: STUDENT IN AN ORGANIZED HEALTH CARE EDUCATION/TRAINING PROGRAM

## 2022-06-08 PROCEDURE — 80053 COMPREHEN METABOLIC PANEL: CPT | Performed by: EMERGENCY MEDICINE

## 2022-06-08 PROCEDURE — 87040 BLOOD CULTURE FOR BACTERIA: CPT | Performed by: EMERGENCY MEDICINE

## 2022-06-08 PROCEDURE — 25000003 PHARM REV CODE 250: Performed by: EMERGENCY MEDICINE

## 2022-06-08 PROCEDURE — 96365 THER/PROPH/DIAG IV INF INIT: CPT

## 2022-06-08 PROCEDURE — 82010 KETONE BODYS QUAN: CPT | Performed by: STUDENT IN AN ORGANIZED HEALTH CARE EDUCATION/TRAINING PROGRAM

## 2022-06-08 PROCEDURE — 86850 RBC ANTIBODY SCREEN: CPT | Performed by: EMERGENCY MEDICINE

## 2022-06-08 PROCEDURE — 87147 CULTURE TYPE IMMUNOLOGIC: CPT | Performed by: EMERGENCY MEDICINE

## 2022-06-08 PROCEDURE — 93010 ELECTROCARDIOGRAM REPORT: CPT | Mod: ,,, | Performed by: INTERNAL MEDICINE

## 2022-06-08 PROCEDURE — 85652 RBC SED RATE AUTOMATED: CPT | Performed by: EMERGENCY MEDICINE

## 2022-06-08 PROCEDURE — C9399 UNCLASSIFIED DRUGS OR BIOLOG: HCPCS | Performed by: STUDENT IN AN ORGANIZED HEALTH CARE EDUCATION/TRAINING PROGRAM

## 2022-06-08 PROCEDURE — 25000003 PHARM REV CODE 250: Performed by: INTERNAL MEDICINE

## 2022-06-08 PROCEDURE — 86140 C-REACTIVE PROTEIN: CPT | Performed by: EMERGENCY MEDICINE

## 2022-06-08 PROCEDURE — 85025 COMPLETE CBC W/AUTO DIFF WBC: CPT | Performed by: EMERGENCY MEDICINE

## 2022-06-08 PROCEDURE — 11042 DBRDMT SUBQ TIS 1ST 20SQCM/<: CPT | Mod: 59,,, | Performed by: PODIATRIST

## 2022-06-08 PROCEDURE — 11044 DEBRIDEMENT: ICD-10-PCS | Mod: ,,, | Performed by: STUDENT IN AN ORGANIZED HEALTH CARE EDUCATION/TRAINING PROGRAM

## 2022-06-08 PROCEDURE — 87076 CULTURE ANAEROBE IDENT EACH: CPT | Performed by: STUDENT IN AN ORGANIZED HEALTH CARE EDUCATION/TRAINING PROGRAM

## 2022-06-08 PROCEDURE — 87186 SC STD MICRODIL/AGAR DIL: CPT | Performed by: EMERGENCY MEDICINE

## 2022-06-08 PROCEDURE — 25000003 PHARM REV CODE 250

## 2022-06-08 PROCEDURE — 93005 ELECTROCARDIOGRAM TRACING: CPT

## 2022-06-08 PROCEDURE — 99900035 HC TECH TIME PER 15 MIN (STAT)

## 2022-06-08 PROCEDURE — C9399 UNCLASSIFIED DRUGS OR BIOLOG: HCPCS | Performed by: NURSE PRACTITIONER

## 2022-06-08 PROCEDURE — 87075 CULTR BACTERIA EXCEPT BLOOD: CPT | Performed by: STUDENT IN AN ORGANIZED HEALTH CARE EDUCATION/TRAINING PROGRAM

## 2022-06-08 PROCEDURE — 87116 MYCOBACTERIA CULTURE: CPT | Performed by: STUDENT IN AN ORGANIZED HEALTH CARE EDUCATION/TRAINING PROGRAM

## 2022-06-08 PROCEDURE — 82803 BLOOD GASES ANY COMBINATION: CPT

## 2022-06-08 PROCEDURE — 25000003 PHARM REV CODE 250: Performed by: NURSE PRACTITIONER

## 2022-06-08 PROCEDURE — 99223 1ST HOSP IP/OBS HIGH 75: CPT | Mod: ,,, | Performed by: STUDENT IN AN ORGANIZED HEALTH CARE EDUCATION/TRAINING PROGRAM

## 2022-06-08 PROCEDURE — 93010 EKG 12-LEAD: ICD-10-PCS | Mod: ,,, | Performed by: INTERNAL MEDICINE

## 2022-06-08 PROCEDURE — 63600175 PHARM REV CODE 636 W HCPCS: Performed by: NURSE PRACTITIONER

## 2022-06-08 PROCEDURE — 12000002 HC ACUTE/MED SURGE SEMI-PRIVATE ROOM

## 2022-06-08 PROCEDURE — 63600175 PHARM REV CODE 636 W HCPCS: Performed by: EMERGENCY MEDICINE

## 2022-06-08 PROCEDURE — 99233 PR SUBSEQUENT HOSPITAL CARE,LEVL III: ICD-10-PCS | Mod: 25,,, | Performed by: PODIATRIST

## 2022-06-08 PROCEDURE — 87070 CULTURE OTHR SPECIMN AEROBIC: CPT | Performed by: EMERGENCY MEDICINE

## 2022-06-08 PROCEDURE — U0002 COVID-19 LAB TEST NON-CDC: HCPCS | Performed by: EMERGENCY MEDICINE

## 2022-06-08 PROCEDURE — 83036 HEMOGLOBIN GLYCOSYLATED A1C: CPT | Performed by: EMERGENCY MEDICINE

## 2022-06-08 PROCEDURE — 11044 PR DEBRIDEMENT, SKIN, SUB-Q TISSUE,MUSCLE,BONE,=<20 SQ CM: ICD-10-PCS | Mod: ,,, | Performed by: PODIATRIST

## 2022-06-08 PROCEDURE — 63600175 PHARM REV CODE 636 W HCPCS: Performed by: INTERNAL MEDICINE

## 2022-06-08 PROCEDURE — 87077 CULTURE AEROBIC IDENTIFY: CPT | Performed by: EMERGENCY MEDICINE

## 2022-06-08 PROCEDURE — 87077 CULTURE AEROBIC IDENTIFY: CPT | Mod: 59 | Performed by: STUDENT IN AN ORGANIZED HEALTH CARE EDUCATION/TRAINING PROGRAM

## 2022-06-08 RX ORDER — INSULIN ASPART 100 [IU]/ML
1-10 INJECTION, SOLUTION INTRAVENOUS; SUBCUTANEOUS EVERY 6 HOURS PRN
Status: DISCONTINUED | OUTPATIENT
Start: 2022-06-08 | End: 2022-06-08

## 2022-06-08 RX ORDER — GLUCAGON 1 MG
1 KIT INJECTION
Status: DISCONTINUED | OUTPATIENT
Start: 2022-06-08 | End: 2022-06-08

## 2022-06-08 RX ORDER — AMOXICILLIN 250 MG
1 CAPSULE ORAL 2 TIMES DAILY PRN
Status: DISCONTINUED | OUTPATIENT
Start: 2022-06-08 | End: 2022-06-10 | Stop reason: HOSPADM

## 2022-06-08 RX ORDER — IBUPROFEN 200 MG
24 TABLET ORAL
Status: DISCONTINUED | OUTPATIENT
Start: 2022-06-08 | End: 2022-06-08

## 2022-06-08 RX ORDER — LISINOPRIL 20 MG/1
40 TABLET ORAL DAILY
Status: DISCONTINUED | OUTPATIENT
Start: 2022-06-08 | End: 2022-06-10 | Stop reason: HOSPADM

## 2022-06-08 RX ORDER — DIPHENHYDRAMINE HCL 25 MG
25 CAPSULE ORAL
Status: DISCONTINUED | OUTPATIENT
Start: 2022-06-08 | End: 2022-06-08

## 2022-06-08 RX ORDER — SODIUM CHLORIDE 0.9 % (FLUSH) 0.9 %
10 SYRINGE (ML) INJECTION EVERY 12 HOURS PRN
Status: DISCONTINUED | OUTPATIENT
Start: 2022-06-08 | End: 2022-06-10 | Stop reason: HOSPADM

## 2022-06-08 RX ORDER — ASCORBIC ACID 250 MG
500 TABLET ORAL DAILY
COMMUNITY

## 2022-06-08 RX ORDER — CARVEDILOL 12.5 MG/1
12.5 TABLET ORAL 2 TIMES DAILY
Status: DISCONTINUED | OUTPATIENT
Start: 2022-06-08 | End: 2022-06-10 | Stop reason: HOSPADM

## 2022-06-08 RX ORDER — IBUPROFEN 200 MG
16 TABLET ORAL
Status: DISCONTINUED | OUTPATIENT
Start: 2022-06-08 | End: 2022-06-10

## 2022-06-08 RX ORDER — ATORVASTATIN CALCIUM 20 MG/1
80 TABLET, FILM COATED ORAL NIGHTLY
Status: DISCONTINUED | OUTPATIENT
Start: 2022-06-08 | End: 2022-06-10 | Stop reason: HOSPADM

## 2022-06-08 RX ORDER — TALC
6 POWDER (GRAM) TOPICAL NIGHTLY PRN
Status: DISCONTINUED | OUTPATIENT
Start: 2022-06-08 | End: 2022-06-10 | Stop reason: HOSPADM

## 2022-06-08 RX ORDER — ASPIRIN 81 MG/1
81 TABLET ORAL DAILY
Status: DISCONTINUED | OUTPATIENT
Start: 2022-06-08 | End: 2022-06-10 | Stop reason: HOSPADM

## 2022-06-08 RX ORDER — CEFEPIME HYDROCHLORIDE 1 G/50ML
2 INJECTION, SOLUTION INTRAVENOUS
Status: DISCONTINUED | OUTPATIENT
Start: 2022-06-08 | End: 2022-06-10

## 2022-06-08 RX ORDER — INSULIN ASPART 100 [IU]/ML
10 INJECTION, SOLUTION INTRAVENOUS; SUBCUTANEOUS ONCE
Status: DISCONTINUED | OUTPATIENT
Start: 2022-06-08 | End: 2022-06-08

## 2022-06-08 RX ORDER — NALOXONE HCL 0.4 MG/ML
0.02 VIAL (ML) INJECTION
Status: DISCONTINUED | OUTPATIENT
Start: 2022-06-08 | End: 2022-06-10 | Stop reason: HOSPADM

## 2022-06-08 RX ORDER — CLINDAMYCIN PHOSPHATE 900 MG/50ML
900 INJECTION, SOLUTION INTRAVENOUS
Status: DISCONTINUED | OUTPATIENT
Start: 2022-06-08 | End: 2022-06-10

## 2022-06-08 RX ORDER — IBUPROFEN 200 MG
24 TABLET ORAL
Status: DISCONTINUED | OUTPATIENT
Start: 2022-06-08 | End: 2022-06-10

## 2022-06-08 RX ORDER — GLUCAGON 1 MG
1 KIT INJECTION
Status: DISCONTINUED | OUTPATIENT
Start: 2022-06-08 | End: 2022-06-10

## 2022-06-08 RX ORDER — INSULIN ASPART 100 [IU]/ML
10 INJECTION, SOLUTION INTRAVENOUS; SUBCUTANEOUS
Status: DISCONTINUED | OUTPATIENT
Start: 2022-06-08 | End: 2022-06-09

## 2022-06-08 RX ORDER — SODIUM CHLORIDE 0.9 % (FLUSH) 0.9 %
10 SYRINGE (ML) INJECTION
Status: DISCONTINUED | OUTPATIENT
Start: 2022-06-08 | End: 2022-06-08

## 2022-06-08 RX ORDER — IBUPROFEN 200 MG
16 TABLET ORAL
Status: DISCONTINUED | OUTPATIENT
Start: 2022-06-08 | End: 2022-06-08

## 2022-06-08 RX ORDER — BISMUTH SUBSALICYLATE 525 MG/30ML
15 LIQUID ORAL DAILY PRN
COMMUNITY

## 2022-06-08 RX ORDER — CEFEPIME HYDROCHLORIDE 1 G/50ML
2 INJECTION, SOLUTION INTRAVENOUS
Status: COMPLETED | OUTPATIENT
Start: 2022-06-08 | End: 2022-06-08

## 2022-06-08 RX ORDER — INSULIN ASPART 100 [IU]/ML
1-10 INJECTION, SOLUTION INTRAVENOUS; SUBCUTANEOUS
Status: DISCONTINUED | OUTPATIENT
Start: 2022-06-08 | End: 2022-06-10

## 2022-06-08 RX ADMIN — INSULIN DETEMIR 15 UNITS: 100 INJECTION, SOLUTION SUBCUTANEOUS at 10:06

## 2022-06-08 RX ADMIN — VANCOMYCIN HYDROCHLORIDE 2500 MG: 1.5 INJECTION, POWDER, LYOPHILIZED, FOR SOLUTION INTRAVENOUS at 12:06

## 2022-06-08 RX ADMIN — INSULIN ASPART 3 UNITS: 100 INJECTION, SOLUTION INTRAVENOUS; SUBCUTANEOUS at 10:06

## 2022-06-08 RX ADMIN — INSULIN ASPART 8 UNITS: 100 INJECTION, SOLUTION INTRAVENOUS; SUBCUTANEOUS at 05:06

## 2022-06-08 RX ADMIN — SODIUM CHLORIDE 500 ML: 0.9 INJECTION, SOLUTION INTRAVENOUS at 08:06

## 2022-06-08 RX ADMIN — CEFEPIME HYDROCHLORIDE 2 G: 2 INJECTION, SOLUTION INTRAVENOUS at 10:06

## 2022-06-08 RX ADMIN — CARVEDILOL 12.5 MG: 12.5 TABLET, FILM COATED ORAL at 09:06

## 2022-06-08 RX ADMIN — TACROLIMUS 900 MG: 0.5 CAPSULE ORAL at 07:06

## 2022-06-08 RX ADMIN — SODIUM CHLORIDE 1000 ML: 0.9 INJECTION, SOLUTION INTRAVENOUS at 01:06

## 2022-06-08 RX ADMIN — ASPIRIN 81 MG: 81 TABLET, COATED ORAL at 09:06

## 2022-06-08 RX ADMIN — LISINOPRIL 40 MG: 20 TABLET ORAL at 09:06

## 2022-06-08 RX ADMIN — CEFEPIME HYDROCHLORIDE 2 G: 2 INJECTION, SOLUTION INTRAVENOUS at 12:06

## 2022-06-08 RX ADMIN — INSULIN ASPART 10 UNITS: 100 INJECTION, SOLUTION INTRAVENOUS; SUBCUTANEOUS at 05:06

## 2022-06-08 RX ADMIN — TACROLIMUS 900 MG: 0.5 CAPSULE ORAL at 10:06

## 2022-06-08 RX ADMIN — CEFEPIME HYDROCHLORIDE 2 G: 2 INJECTION, SOLUTION INTRAVENOUS at 09:06

## 2022-06-08 RX ADMIN — INSULIN ASPART 10 UNITS: 100 INJECTION, SOLUTION INTRAVENOUS; SUBCUTANEOUS at 02:06

## 2022-06-08 RX ADMIN — ATORVASTATIN CALCIUM 80 MG: 20 TABLET, FILM COATED ORAL at 09:06

## 2022-06-08 RX ADMIN — INSULIN DETEMIR 15 UNITS: 100 INJECTION, SOLUTION SUBCUTANEOUS at 09:06

## 2022-06-08 NOTE — HPI
"Reason for Consult: Management of T2DM, Hyperglycemia      Diabetes diagnosis year:   "Around 39 years old"     Home Diabetes Medications:    Metformin 1000 mg BID and Trulicity 1.5 mg weekly.   Medtronic 770G  Pump Settings:  Basal 1.9 units/hr  ICR: 1:56 (patient uses pre-set bolus parameters 5 units for snack; 10 units for small meals; 15 units for large meals)  ISF 1:25  Target B-120 mg/dL  IOB: 4 hours     How often checking glucose at home? 1-3 x day   BG readings on regimen: 200's- 300's  Hypoglycemia on the regimen?  No  Missed doses on regimen?  No     Diabetes Complications include:     Hyperglycemia, Diabetic retinopathy , Foot ulcer. Foot wound,   and Other skin ulcer     Complicating diabetes co morbidities:   History of MI and MICHAELA, CAD, HTN, HLD,         HPI: Billy Rivera is a 56 year old man with T2DM (last A1c 12), HTN, CAD, MICHAELA not on CPAP who presented for right fifth toe wound. He first noticed the wound 3 days ago while changing the bandage, then on day of presentation noticed that a blister had ruptured and that it looked worse. Saw clear drainage. No fevers, chills, nausea, or foot pain. Works as a . Lives with his wife. Ambulates without assistive device, independent for ADLs. In ED VSS, labs notable for ESR 78 and CRP 70. No leukocytosis. Blood cultures drawn, toe culture sent. Received dose of cefepime and 1L NS. XR showed cortical breakdown. MRI ordered. Endocrine consulted to manage hyperglycemia, type 2 diabetes, and insulin pump therapy.     Lab Results   Component Value Date    HGBA1C 10.0 (H) 2022       "

## 2022-06-08 NOTE — CONSULTS
[facility] - Emergency Dept  Podiatry  Consult Note    Patient Name: Billy Sheffield Miguel  MRN: [id number]  Admission Date: 6/7/2022  Hospital Length of Stay: 0 days  Attending Physician: Yasmin Low MD  Primary Care Provider: BRAD Baker MD     Inpatient consult to Podiatry  Consult performed by: Josiah Saunders MD  Consult ordered by: Shama Madrid MD        Subjective:     History of Present Illness:  57 yo M with Hx of DM, CAD, MI, cancer admitted 6/7 for right foot infection. Followed outpatient by [facility] podiatry for chronic right plantar foot wound, last seen 5/25. No major issues with that wound. He reports that a few days ago a blister formed on the right 5th toe, it then ruptured and has worsened since. Reports clear foul drainage, discoloration of the toe. Admits to some pain in the area of the 5th toe.       Scheduled Meds:   aspirin  81 mg Oral Daily    atorvastatin  80 mg Oral QHS    carvediloL  12.5 mg Oral BID    ceFEPime (MAXIPIME) IVPB  2 g Intravenous Q8H    clindamycin (CLEOCIN) IVPB  900 mg Intravenous Q8H    insulin detemir U-100  15 Units Subcutaneous BID    lisinopriL  40 mg Oral Daily    vancomycin (VANCOCIN) IVPB  2,500 mg Intravenous Once     Continuous Infusions:  PRN Meds:dextrose 10%, dextrose 10%, dextrose 10%, dextrose 10%, glucagon (human recombinant), glucagon (human recombinant), glucagon (human recombinant), glucose, glucose, insulin aspart U-100, melatonin, naloxone, senna-docusate 8.6-50 mg, sodium chloride 0.9%, sodium chloride 0.9%, Pharmacy to dose Vancomycin consult **AND** vancomycin - pharmacy to dose    Review of patient's allergies indicates:   Allergen Reactions    Penicillins Other (See Comments)     PCN allergy as a child - was told he went into a coma. Tolerates Cefazolin without adverse reactions    Penicillin     Shellfish containing products      Other reaction(s): Unknown    Vancomycin Itching     Tolerated vancomycin 7/2020    Bactrim   [sulfamethoxazole-trimethoprim] Rash        Past Medical History:   Diagnosis Date    Allergy     CAD (coronary artery disease), native coronary artery 6/25/2013    Cancer     COVID-19 virus detected 9/1/2020    Diabetes mellitus     Diabetes mellitus, type 2     Disorder of kidney and ureter     Heart attack 04/2012    Hx of colon cancer, stage I     Hyperlipidemia     Hypertension     Muscular pain     post-op after colonoscopy    NSTEMI May 2013 - peak troponin 0.22 5/22/2013    MICHAELA (obstructive sleep apnea)     Retinopathy due to secondary diabetes      Past Surgical History:   Procedure Laterality Date    COLONOSCOPY N/A 2/17/2017    Procedure: COLONOSCOPY;  Surgeon: Simon Gomez MD;  Location: [facility] ENDO (4TH FLR);  Service: Endoscopy;  Laterality: N/A;    CORONARY ANGIOPLASTY      INCISION AND DRAINAGE FOOT Right 6/5/2018    Procedure: INCISION AND DRAINAGE, FOOT;  Surgeon: Bridget Santana DPM;  Location: [facility] OR 1ST FLR;  Service: Podiatry;  Laterality: Right;  Request mini C arm in room. request wound vac with medium black sponge    INCISION AND DRAINAGE FOOT Right 6/7/2018    Procedure: INCISION AND DRAINAGE, FOOT;  Surgeon: Emelia Brock DPM;  Location: [facility] OR 2ND FLR;  Service: Podiatry;  Laterality: Right;    INCISION AND DRAINAGE FOOT Left 9/4/2020    Procedure: INCISION AND DRAINAGE, FOOT;  Surgeon: Ainsley Powell DPM;  Location: [facility] OR 2ND FLR;  Service: Podiatry;  Laterality: Left;    INCISION AND DRAINAGE FOOT Left 12/22/2020    Procedure: INCISION AND DRAINAGE, FOOT;  Surgeon: Ainsley Powell DPM;  [facility];  Service: Podiatry;  Laterality: Left;  May need micro choice  Need Jam shidi needles  Stretcher OK      INCISION AND DRAINAGE OF ABSCESS Right 6/2/2018    Procedure: INCISION AND DRAINAGE, ABSCESS;  Surgeon: Bridget Santana DPM;  [facility];  Service: General;  Laterality: Right;    OSTEOTOMY OF METATARSAL BONE  9/4/2020     Procedure: OSTEOTOMY, METATARSAL BONE, 1st METATARSAL RESECTION;  Surgeon: Ainsley Powell DPM;  Location: [facility] OR 2ND FLR;  Service: Podiatry;;    REMOVAL OF FOREIGN BODY FROM FOOT Right 6/7/2018    Procedure: REMOVAL, FOREIGN BODY, FOOT;  Surgeon: Emelia rBock DPM;  Location: [facility] OR 2ND FLR;  Service: Podiatry;  Laterality: Right;    TOE AMPUTATION Left 7/4/2020    Procedure: AMPUTATION, TOE;  Surgeon: Bridget Santana DPM;  Location: [facility] OR 2ND FLR;  Service: Podiatry;  Laterality: Left;    TOE AMPUTATION Left 10/14/2020    Procedure: AMPUTATION, TOE;  Surgeon: Mingo Melara DPM;  Location: [facility] OR 2ND FLR;  Service: Podiatry;  Laterality: Left;  stretcher OK    TONSILLECTOMY         Family History       Problem Relation (Age of Onset)    Diabetes Mother, Father, Maternal Grandmother, Maternal Grandfather, Paternal Grandmother, Paternal Grandfather    Heart disease Mother, Brother          Tobacco Use    Smoking status: Never Smoker    Smokeless tobacco: Never Used   Substance and Sexual Activity    Alcohol use: Yes     Comment: rare x1 beer-     Drug use: No    Sexual activity: Not Currently     Review of Systems   Constitutional:  Negative for chills and fever.   HENT:  Negative for rhinorrhea and sneezing.    Eyes:  Negative for pain and redness.   Respiratory:  Negative for cough and shortness of breath.    Gastrointestinal:  Negative for abdominal pain and nausea.   Genitourinary:  Negative for difficulty urinating and dysuria.   Musculoskeletal:  Positive for gait problem. Negative for back pain and neck pain.   Skin:  Positive for color change and wound.   Neurological:  Positive for numbness. Negative for tremors and speech difficulty.   Psychiatric/Behavioral:  Negative for agitation and confusion.    Objective:     Vital Signs (Most Recent):  Temp: 98 °F (36.7 °C) (06/08/22 0944)  Pulse: (!) 48 (06/08/22 1236)  Resp: 19 (06/08/22 1106)  BP: 120/65 (06/08/22 1236)  SpO2: 98 % (06/08/22  1236)   Vital Signs (24h Range):  Temp:  [98 °F (36.7 °C)-98.4 °F (36.9 °C)] 98 °F (36.7 °C)  Pulse:  [48-85] 48  Resp:  [15-19] 19  SpO2:  [93 %-98 %] 98 %  BP: (120-179)/(58-93) 120/65     Weight: 129.3 kg (285 lb)  Body mass index is 36.59 kg/m².    Foot Exam    General  Orientation: alert and oriented to person, place, and time   Affect: appropriate       Right Foot/Ankle     Inspection and Palpation  Tenderness: (5th toe)  Skin Exam: drainage, cellulitis and ulcer;     Neurovascular  Dorsalis pedis: doppler  Posterior tibial: doppler  Saphenous nerve sensation: diminished  Tibial nerve sensation: diminished  Superficial peroneal nerve sensation: diminished  Deep peroneal nerve sensation: diminished  Sural nerve sensation: diminished      Left Foot/Ankle      Inspection and Palpation  Ecchymosis: none  Tenderness: none   Skin Exam: callus; no cellulitis and no ulcer     Neurovascular  Dorsalis pedis: doppler  Posterior tibial: doppler  Saphenous nerve sensation: diminished  Tibial nerve sensation: diminished  Superficial peroneal nerve sensation: diminished  Deep peroneal nerve sensation: diminished  Sural nerve sensation: diminished    Comments  1st ray amputationBlood Cultures:   Recent Labs   Lab 06/08/22 0036   LABBLOO No Growth to date  No Growth to date     CBC:   Recent Labs   Lab 06/08/22 0036   WBC 9.73   RBC 4.48*   HGB 13.6*   HCT 38.9*      MCV 87   MCH 30.4   MCHC 35.0     CMP:   Recent Labs   Lab 06/08/22 0036   *   CALCIUM 8.9   ALBUMIN 2.9*   PROT 7.2   *   K 4.7   CO2 20*   CL 97   BUN 26*   CREATININE 1.3   ALKPHOS 91   ALT 19   AST 17   BILITOT 0.5     CRP:   Recent Labs   Lab 06/08/22 0036   CRP 69.6*     ESR:   Recent Labs   Lab 06/08/22 0036   SEDRATE 78*     Microbiology Results (last 7 days)       Procedure Component Value Units Date/Time    Aerobic culture [[id number]] Collected: 06/08/22 1231    Order Status: Sent Specimen: Wound from Foot, Right Updated:  06/08/22 1300    Gram stain [[id number]] Collected: 06/08/22 1231    Order Status: Sent Specimen: Wound from Foot, Right Updated: 06/08/22 1259    Culture, Anaerobe [[id number]] Collected: 06/08/22 1231    Order Status: Sent Specimen: Wound from Foot, Right Updated: 06/08/22 1259    AFB Culture & Smear [[id number]] Collected: 06/08/22 1231    Order Status: Sent Specimen: Wound from Foot, Right Updated: 06/08/22 1259    Blood culture #1 **CANNOT BE ORDERED STAT** [[id number]] Collected: 06/08/22 0036    Order Status: Completed Specimen: Blood from Peripheral, Antecubital, Left Updated: 06/08/22 0745     Blood Culture, Routine No Growth to date    Blood culture #2 **CANNOT BE ORDERED STAT** [[id number]] Collected: 06/08/22 0036    Order Status: Completed Specimen: Blood from Peripheral, Antecubital, Left Updated: 06/08/22 0745     Blood Culture, Routine No Growth to date    Aerobic culture [[id number]] Collected: 06/08/22 0206    Order Status: Sent Specimen: Wound from Toe, Right Foot Updated: 06/08/22 0211          Specimen (24h ago, onward)                None            Laboratory:  Blood Cultures:   Recent Labs   Lab 06/08/22 0036   LABBLOO No Growth to date  No Growth to date     CBC:   Recent Labs   Lab 06/08/22 0036   WBC 9.73   RBC 4.48*   HGB 13.6*   HCT 38.9*      MCV 87   MCH 30.4   MCHC 35.0     CMP:   Recent Labs   Lab 06/08/22 0036   *   CALCIUM 8.9   ALBUMIN 2.9*   PROT 7.2   *   K 4.7   CO2 20*   CL 97   BUN 26*   CREATININE 1.3   ALKPHOS 91   ALT 19   AST 17   BILITOT 0.5     CRP:   Recent Labs   Lab 06/08/22 0036   CRP 69.6*     ESR:   Recent Labs   Lab 06/08/22 0036   SEDRATE 78*     Microbiology Results (last 7 days)       Procedure Component Value Units Date/Time    Aerobic culture [[id number]] Collected: 06/08/22 1231    Order Status: Sent Specimen: Wound from Foot, Right Updated: 06/08/22 1300    Gram stain [[id number]] Collected: 06/08/22 1231    Order Status: Sent Specimen:  Wound from Foot, Right Updated: 06/08/22 1259    Culture, Anaerobe [[id number]] Collected: 06/08/22 1231    Order Status: Sent Specimen: Wound from Foot, Right Updated: 06/08/22 1259    AFB Culture & Smear [[id number]] Collected: 06/08/22 1231    Order Status: Sent Specimen: Wound from Foot, Right Updated: 06/08/22 1259    Blood culture #1 **CANNOT BE ORDERED STAT** [[id number]] Collected: 06/08/22 0036    Order Status: Completed Specimen: Blood from Peripheral, Antecubital, Left Updated: 06/08/22 0745     Blood Culture, Routine No Growth to date    Blood culture #2 **CANNOT BE ORDERED STAT** [[id number]] Collected: 06/08/22 0036    Order Status: Completed Specimen: Blood from Peripheral, Antecubital, Left Updated: 06/08/22 0745     Blood Culture, Routine No Growth to date    Aerobic culture [[id number]] Collected: 06/08/22 0206    Order Status: Sent Specimen: Wound from Toe, Right Foot Updated: 06/08/22 0211          Specimen (24h ago, onward)                None              Clinical Findings:  -Right foot with 5th toe dorsolateral wound, 2.5x1x1 cm, exposed tendon, exposed bone, necrotic base and periwound, 5th toe dusky, foul serous drainage, fluctuance. Cellulitis extending into foot.   -Right plantar forefoot wound, 1.5x1.2x0.4 cm, exposed subcutaneous tissue, periwound callus, viable wound bed post-debridement, not clinically infected.         Right lateral foot pre-debridement    Right lateral foot post-debridement    Right plantar foot pre-debridement     Right plantar foot post-debridement        Assessment/Plan:     * Osteomyelitis of right foot  Assessment:   -IDSA moderate diabetic foot infection with osteomyelitis in all right 5th phalanges, cellulitis, anaerobic component to infection infection with gas on imaging. Cultures pending. No systemic infection.   -Right plantar foot wound down to subcutaneous tissue, not infected.   -Known PAD, arterial US pending.    Plan:  -Bedside debridement performed, see  procedure note.   -Right 5th toe not viable. Patient amenable to amputation. Case request submitted for 6/9. NPO @ midnight.   -Antibiotic plan per primary team, tailor to cultures.   -Arterial US pending, will follow.   -WB to right heel for transfers or short distances only.  -Elevate RLE at all times while in bed or chair.   -Podiatry will follow.     Ulcer of right foot with fat layer exposed  See rest of plan    Peripheral arterial disease  See rest of plan        Thank you for your consult. I will follow-up with patient. Please contact us if you have any additional questions.      Josiah Saunders DPM PGY-2  Podiatric Medicine & Surgery  [facility]  Secure Chat Preferred  Mobile: [phone]  Pager: [phone]

## 2022-06-08 NOTE — H&P
[facility] - Emergency Dept  [facility]  History & Physical    Patient Name: Billy Rivera  MRN: [id number]  Patient Class: IP- Inpatient  Admission Date: 6/7/2022  Attending Physician: Yasmin Low MD   Primary Care Provider: BRAD Baker MD         Patient information was obtained from patient, past medical records and ER records.     Subjective:     Principal Problem:Osteomyelitis of right foot    Chief Complaint:   Chief Complaint   Patient presents with    Wound Check     Pt has an infected R pink toe and would like to get it checked out.         HPI: Billy Rivera is a 56 year old man with T2DM (last A1c 12), HTN, CAD, MICHAELA not on CPAP who presented for right fifth toe wound. He first noticed the wound 3 days ago while changing the bandage, then on day of presentation noticed that a blister had ruptured and that it looked worse. Saw clear drainage. No fevers, chills, nausea, or foot pain.     Works as a . Lives with his wife. Ambulates without assistive device, independent for ADLs.    In ED VSS, labs notable for ESR 78 and CRP 70. No leukocytosis. Blood cultures drawn, toe culture sent. Received dose of cefepime and 1L NS. XR showed cortical breakdown. MRI ordered.      Past Medical History:   Diagnosis Date    Allergy     CAD (coronary artery disease), native coronary artery 6/25/2013    Cancer     COVID-19 virus detected 9/1/2020    Diabetes mellitus     Diabetes mellitus, type 2     Disorder of kidney and ureter     Heart attack 04/2012    Hx of colon cancer, stage I     Hyperlipidemia     Hypertension     Muscular pain     post-op after colonoscopy    NSTEMI May 2013 - peak troponin 0.22 5/22/2013    MICHAELA (obstructive sleep apnea)     Retinopathy due to secondary diabetes        Past Surgical History:   Procedure Laterality Date    COLONOSCOPY N/A 2/17/2017    Procedure: COLONOSCOPY;  Surgeon: Simon Gomez MD;  Location: [facility] ([address] [place] [address]);   Service: Endoscopy;  Laterality: N/A;    CORONARY ANGIOPLASTY      INCISION AND DRAINAGE FOOT Right 6/5/2018    Procedure: INCISION AND DRAINAGE, FOOT;  Surgeon: Bridget Santana DPM;  Location: [facility] OR 1ST FLR;  Service: Podiatry;  Laterality: Right;  Request mini C arm in room. request wound vac with medium black sponge    INCISION AND DRAINAGE FOOT Right 6/7/2018    Procedure: INCISION AND DRAINAGE, FOOT;  Surgeon: Emelia Brock DPM;  Location: [facility] OR 2ND FLR;  Service: Podiatry;  Laterality: Right;    INCISION AND DRAINAGE FOOT Left 9/4/2020    Procedure: INCISION AND DRAINAGE, FOOT;  Surgeon: Ainsley Powell DPM;  Location: [facility] OR 2ND FLR;  Service: Podiatry;  Laterality: Left;    INCISION AND DRAINAGE FOOT Left 12/22/2020    Procedure: INCISION AND DRAINAGE, FOOT;  Surgeon: Ainsley Powell DPM;  Location: [facility] OR 2ND FLR;  Service: Podiatry;  Laterality: Left;  May need micro choice  Need Jam shidi needles  Stretcher OK      INCISION AND DRAINAGE OF ABSCESS Right 6/2/2018    Procedure: INCISION AND DRAINAGE, ABSCESS;  Surgeon: Bridget Santana DPM;  Location: [facility] OR 2ND FLR;  Service: General;  Laterality: Right;    OSTEOTOMY OF METATARSAL BONE  9/4/2020    Procedure: OSTEOTOMY, METATARSAL BONE, 1st METATARSAL RESECTION;  Surgeon: Ainsley Powell DPM;  Location: [facility] OR 2ND FLR;  Service: Podiatry;;    REMOVAL OF FOREIGN BODY FROM FOOT Right 6/7/2018    Procedure: REMOVAL, FOREIGN BODY, FOOT;  Surgeon: Emelia Brock DPM;  Location: [facility] OR 2ND FLR;  Service: Podiatry;  Laterality: Right;    TOE AMPUTATION Left 7/4/2020    Procedure: AMPUTATION, TOE;  Surgeon: Bridget Santana DPM;  Location: [facility] OR 2ND FLR;  Service: Podiatry;  Laterality: Left;    TOE AMPUTATION Left 10/14/2020    Procedure: AMPUTATION, TOE;  Surgeon: Mingo Melara DPM;  Location: [facility] OR 2ND FLR;  Service: Podiatry;  Laterality: Left;  stretcher OK    TONSILLECTOMY         Review of patient's allergies indicates:    Allergen Reactions    Penicillins Other (See Comments)     PCN allergy as a child - was told he went into a coma. Tolerates Cefazolin without adverse reactions    Penicillin     Shellfish containing products      Other reaction(s): Unknown    Vancomycin Itching     Tolerated vancomycin 7/2020    Bactrim  [sulfamethoxazole-trimethoprim] Rash       No current facility-administered medications on file prior to encounter.     Current Outpatient Medications on File Prior to Encounter   Medication Sig    acetaminophen (TYLENOL) 325 MG tablet Take 325 mg by mouth every 6 (six) hours as needed for Pain.    aspirin (ECOTRIN) 81 MG EC tablet Take 1 tablet (81 mg total) by mouth once daily.    aspirin/salicylamide/caffeine (BC HEADACHE POWDER ORAL) Take 1 Package by mouth daily as needed (pain).    atorvastatin (LIPITOR) 80 MG tablet Take 1 tablet (80 mg total) by mouth once daily. Take every night.    blood sugar diagnostic (ACCU-CHEK GUIDE TEST STRIPS) Strp 1 each by Misc.(Non-Drug; Combo Route) route 5 (five) times daily.    carvediloL (COREG) 12.5 MG tablet Take 1 tablet (12.5 mg total) by mouth 2 (two) times daily.    clotrimazole-betamethasone 1-0.05% (LOTRISONE) cream Apply topically 2 (two) times daily.    flash glucose sensor (FREESTYLE MICHAEL 2 SENSOR) Kit 1 each by Misc.(Non-Drug; Combo Route) route every 14 (fourteen) days.    ibuprofen (ADVIL,MOTRIN) 200 MG tablet Take 200 mg by mouth every 6 (six) hours as needed for Pain.    insulin (LANTUS SOLOSTAR U-100 INSULIN) glargine 100 units/mL (3mL) SubQ pen Inject 60 Units into the skin once daily.    insulin lispro (HUMALOG U-100 INSULIN) 100 unit/mL injection Inject 100 Units into the skin continuous. Gives via insulin pump only. Do not Directly inject.    lancets (ACCU-CHEK FASTCLIX LANCET DRUM) Misc 1 each by Misc.(Non-Drug; Combo Route) route Daily.    lisinopriL (PRINIVIL,ZESTRIL) 40 MG tablet Take 1 tablet by mouth once daily    melatonin  "(MELATIN) 3 mg tablet Take 2 tablets (6 mg total) by mouth nightly as needed for Insomnia.    metFORMIN (GLUCOPHAGE) 1000 MG tablet Take 1 tablet (1,000 mg total) by mouth 2 (two) times daily with meals.    MINIMED 770G INSULIN PUMP Misc 1 each by Misc.(Non-Drug; Combo Route) route continuous. Medically necessary for management of Type 2 diabetes, E11.65    pen needle, diabetic 31 gauge x 3/16" Ndle Inject 1 each into the skin 3 (three) times daily before meals. (Patient taking differently: Inject 1 each into the skin 4 (four) times daily.)     Family History       Problem Relation (Age of Onset)    Diabetes Mother, Father, Maternal Grandmother, Maternal Grandfather, Paternal Grandmother, Paternal Grandfather    Heart disease Mother, Brother          Tobacco Use    Smoking status: Never Smoker    Smokeless tobacco: Never Used   Substance and Sexual Activity    Alcohol use: Yes     Comment: rare x1 beer-     Drug use: No    Sexual activity: Not Currently     Review of Systems   Constitutional:  Negative for chills and fever.   HENT:  Negative for rhinorrhea and sneezing.    Eyes:  Negative for pain and redness.   Respiratory:  Negative for cough and shortness of breath.    Gastrointestinal:  Negative for abdominal pain and nausea.   Genitourinary:  Negative for difficulty urinating and dysuria.   Musculoskeletal:  Negative for back pain and neck pain.   Skin:  Positive for color change and wound.   Neurological:  Negative for tremors and speech difficulty.   Psychiatric/Behavioral:  Negative for agitation and confusion.    Objective:     Vital Signs (Most Recent):  Temp: 98 °F (36.7 °C) (06/08/22 0436)  Pulse: 62 (06/08/22 0436)  Resp: 15 (06/08/22 0436)  BP: (!) 161/82 (06/08/22 0436)  SpO2: 97 % (06/08/22 0436)   Vital Signs (24h Range):  Temp:  [98 °F (36.7 °C)-98.4 °F (36.9 °C)] 98 °F (36.7 °C)  Pulse:  [62-85] 62  Resp:  [15-18] 15  SpO2:  [96 %-98 %] 97 %  BP: (152-179)/(77-93) 161/82     Weight: 129.3 " kg (285 lb)  Body mass index is 36.59 kg/m².    Physical Exam  Vitals and nursing note reviewed.   Constitutional:       General: He is not in acute distress.  HENT:      Head: Normocephalic and atraumatic.      Nose: Nose normal. No rhinorrhea.      Mouth/Throat:      Mouth: Mucous membranes are moist.      Pharynx: Oropharynx is clear.   Eyes:      Extraocular Movements: Extraocular movements intact.      Conjunctiva/sclera: Conjunctivae normal.   Cardiovascular:      Rate and Rhythm: Normal rate and regular rhythm.      Heart sounds: No murmur heard.  Pulmonary:      Effort: Pulmonary effort is normal. No respiratory distress.      Breath sounds: Normal breath sounds.   Abdominal:      Palpations: Abdomen is soft.      Tenderness: There is no abdominal tenderness.   Musculoskeletal:      Right lower leg: No edema.      Left lower leg: No edema.      Comments: Amputated left first toe.  See photo of right fifth toe below.   Skin:     General: Skin is warm and dry.      Capillary Refill: Capillary refill takes less than 2 seconds.   Neurological:      General: No focal deficit present.      Mental Status: He is alert and oriented to person, place, and time.   Psychiatric:         Mood and Affect: Mood normal.         Behavior: Behavior normal.                 Significant Labs: All pertinent labs within the past 24 hours have been reviewed.    Significant Imaging: I have reviewed all pertinent imaging results/findings within the past 24 hours.    Assessment/Plan:     * Osteomyelitis of right foot  56 year old man with poorly controlled T2DM, HTN, CAD here for right 5th digit wound first noticed a few days prior to presentation. VSS, no leukocytosis. Initial ESR 78, CRP 70. Received cefepime in ED. Notified by Radiology that MRI shows osteomyelitis of the digit without abscess. Discussed finding with patient, who is open to amputation if necessary.    - Podiatry consulted regarding possible amputation and biopsy  -  NPO and hold DVT ppx for now  - hold off on further abx while hemodynamically stable for biopsy yield  - f/u blood cultures, toe culture    Type 2 diabetes mellitus with diabetic peripheral angiopathy without gangrene, with long-term current use of insulin  Last A1c 12.2 from 1/2022. Taking U-100 insulin via insulin pump and metformin 1,000 mg BID prior to admission.    - check HbA1c  - continue insulin pump, consider Endo consult regarding adjustments  - accuchecks QID  - goal -180  - diabetic diet once no longer NPO      Hypertension associated with diabetes  - continue home coreg 12.5 mg BID, lisinopril 40 mg QD      CAD (coronary artery disease)  - continue home ASA 81 mg, atorvastatin 80 mg      Sleep apnea  Has tried CPAP in the past, not using at home.        VTE Risk Mitigation (From admission, onward)         Ordered     Reason for No Pharmacological VTE Prophylaxis  Once        Question:  Reasons:  Answer:  Physician Provided (leave comment)  Comment:  held for possible procedure    06/08/22 0428     IP VTE HIGH RISK PATIENT  Once         06/08/22 0428     Place sequential compression device  Until discontinued         06/08/22 0428                   Shama Madrid MD  Department of Hospital Medicine   [facility] - Emergency Dept

## 2022-06-08 NOTE — ASSESSMENT & PLAN NOTE
Assessment:   -IDSA moderate diabetic foot infection with osteomyelitis in all right 5th phalanges, cellulitis, anaerobic component to infection infection with gas on imaging. Cultures pending. No systemic infection.   -Right plantar foot wound down to subcutaneous tissue, not infected.   -Known PAD, arterial US pending.    Plan:  -Bedside debridement performed, see procedure note.   -Right 5th toe not viable. Patient amenable to amputation. Case request submitted for 6/9. NPO @ midnight.   -Antibiotic plan per primary team, tailor to cultures.   -Arterial US pending, will follow.   -WB to right heel for transfers or short distances only.  -Elevate RLE at all times while in bed or chair.   -Podiatry will follow.

## 2022-06-08 NOTE — HPI
Billy Sheffield Miguel is a 56 year old man with T2DM (last A1c 12), HTN, CAD, MICHAELA not on CPAP who presented for right fifth toe wound. He first noticed the wound 3 days ago while changing the bandage, then on day of presentation noticed that a blister had ruptured and that it looked worse. Saw clear drainage. No fevers, chills, nausea, or foot pain.     Works as a . Lives with his wife. Ambulates without assistive device, independent for ADLs.    In ED VSS, labs notable for ESR 78 and CRP 70. No leukocytosis. Blood cultures drawn, toe culture sent. Received dose of cefepime and 1L NS. XR showed cortical breakdown. MRI ordered.

## 2022-06-08 NOTE — ASSESSMENT & PLAN NOTE
Endocrinology consulted for BG management.   BG goal 140-180      20% reduction in insulin pump settings   - Levemir Flex Pen 15 units BID  - Novolog (aspart) insulin 10 Units SQ TIDWM and prn for BG excursions MDC (150/25)  - BG checks q4hr  - Hypoglycemia protocol in place      ** Please notify Endocrine for any change and/or advance in diet**  ** Please call Endocrine for any BG related issues **    Discharge Planning:   TBD. Please notify endocrinology prior to discharge.

## 2022-06-08 NOTE — ASSESSMENT & PLAN NOTE
Last A1c 12.2 from 1/2022. Taking U-100 insulin via insulin pump and metformin 1,000 mg BID prior to admission.    - check HbA1c  - continue insulin pump, consider Endo consult regarding adjustments  - accuchecks QID  - goal -180  - diabetic diet once no longer NPO

## 2022-06-08 NOTE — SUBJECTIVE & OBJECTIVE
Scheduled Meds:   aspirin  81 mg Oral Daily    atorvastatin  80 mg Oral QHS    carvediloL  12.5 mg Oral BID    ceFEPime (MAXIPIME) IVPB  2 g Intravenous Q8H    clindamycin (CLEOCIN) IVPB  900 mg Intravenous Q8H    insulin detemir U-100  15 Units Subcutaneous BID    lisinopriL  40 mg Oral Daily    vancomycin (VANCOCIN) IVPB  2,500 mg Intravenous Once     Continuous Infusions:  PRN Meds:dextrose 10%, dextrose 10%, dextrose 10%, dextrose 10%, glucagon (human recombinant), glucagon (human recombinant), glucagon (human recombinant), glucose, glucose, insulin aspart U-100, melatonin, naloxone, senna-docusate 8.6-50 mg, sodium chloride 0.9%, sodium chloride 0.9%, Pharmacy to dose Vancomycin consult **AND** vancomycin - pharmacy to dose    Review of patient's allergies indicates:   Allergen Reactions    Penicillins Other (See Comments)     PCN allergy as a child - was told he went into a coma. Tolerates Cefazolin without adverse reactions    Penicillin     Shellfish containing products      Other reaction(s): Unknown    Vancomycin Itching     Tolerated vancomycin 7/2020    Bactrim  [sulfamethoxazole-trimethoprim] Rash        Past Medical History:   Diagnosis Date    Allergy     CAD (coronary artery disease), native coronary artery 6/25/2013    Cancer     COVID-19 virus detected 9/1/2020    Diabetes mellitus     Diabetes mellitus, type 2     Disorder of kidney and ureter     Heart attack 04/2012    Hx of colon cancer, stage I     Hyperlipidemia     Hypertension     Muscular pain     post-op after colonoscopy    NSTEMI May 2013 - peak troponin 0.22 5/22/2013    MICHAELA (obstructive sleep apnea)     Retinopathy due to secondary diabetes      Past Surgical History:   Procedure Laterality Date    COLONOSCOPY N/A 2/17/2017    Procedure: COLONOSCOPY;  Surgeon: Simon Gomez MD;  Location: [facility] ([address] [place] [address]);  Service: Endoscopy;  Laterality: N/A;    CORONARY ANGIOPLASTY      INCISION AND DRAINAGE FOOT Right 6/5/2018    Procedure:  INCISION AND DRAINAGE, FOOT;  Surgeon: Bridget Santana DPM;  Location: [facility] OR 1ST FLR;  Service: Podiatry;  Laterality: Right;  Request mini C arm in room. request wound vac with medium black sponge    INCISION AND DRAINAGE FOOT Right 6/7/2018    Procedure: INCISION AND DRAINAGE, FOOT;  Surgeon: Emelia Brock DPM;  Location: [facility] OR 2ND FLR;  Service: Podiatry;  Laterality: Right;    INCISION AND DRAINAGE FOOT Left 9/4/2020    Procedure: INCISION AND DRAINAGE, FOOT;  Surgeon: Ainsley Powell DPM;  Location: [facility] OR 2ND FLR;  Service: Podiatry;  Laterality: Left;    INCISION AND DRAINAGE FOOT Left 12/22/2020    Procedure: INCISION AND DRAINAGE, FOOT;  Surgeon: Ainsley Powell DPM;  Location: [facility] OR 2ND FLR;  Service: Podiatry;  Laterality: Left;  May need micro choice  Need Jam shidi needles  Stretcher OK      INCISION AND DRAINAGE OF ABSCESS Right 6/2/2018    Procedure: INCISION AND DRAINAGE, ABSCESS;  Surgeon: Bridget Santana DPM;  Location: [facility] OR 2ND FLR;  Service: General;  Laterality: Right;    OSTEOTOMY OF METATARSAL BONE  9/4/2020    Procedure: OSTEOTOMY, METATARSAL BONE, 1st METATARSAL RESECTION;  Surgeon: Ainsley Powell DPM;  Location: [facility] OR 2ND FLR;  Service: Podiatry;;    REMOVAL OF FOREIGN BODY FROM FOOT Right 6/7/2018    Procedure: REMOVAL, FOREIGN BODY, FOOT;  Surgeon: Emelia Brock DPM;  Location: [facility] OR 2ND FLR;  Service: Podiatry;  Laterality: Right;    TOE AMPUTATION Left 7/4/2020    Procedure: AMPUTATION, TOE;  Surgeon: Bridget Santana DPM;  Location: [facility] OR 2ND FLR;  Service: Podiatry;  Laterality: Left;    TOE AMPUTATION Left 10/14/2020    Procedure: AMPUTATION, TOE;  Surgeon: Mingo Melara DPM;  Location: [facility] OR 2ND FLR;  Service: Podiatry;  Laterality: Left;  stretcher OK    TONSILLECTOMY         Family History       Problem Relation (Age of Onset)    Diabetes Mother, Father, Maternal Grandmother, Maternal Grandfather, Paternal Grandmother, Paternal Grandfather    Heart  disease Mother, Brother          Tobacco Use    Smoking status: Never Smoker    Smokeless tobacco: Never Used   Substance and Sexual Activity    Alcohol use: Yes     Comment: rare x1 beer-     Drug use: No    Sexual activity: Not Currently     Review of Systems   Constitutional:  Negative for chills and fever.   HENT:  Negative for rhinorrhea and sneezing.    Eyes:  Negative for pain and redness.   Respiratory:  Negative for cough and shortness of breath.    Gastrointestinal:  Negative for abdominal pain and nausea.   Genitourinary:  Negative for difficulty urinating and dysuria.   Musculoskeletal:  Positive for gait problem. Negative for back pain and neck pain.   Skin:  Positive for color change and wound.   Neurological:  Positive for numbness. Negative for tremors and speech difficulty.   Psychiatric/Behavioral:  Negative for agitation and confusion.    Objective:     Vital Signs (Most Recent):  Temp: 98 °F (36.7 °C) (06/08/22 0944)  Pulse: (!) 48 (06/08/22 1236)  Resp: 19 (06/08/22 1106)  BP: 120/65 (06/08/22 1236)  SpO2: 98 % (06/08/22 1236)   Vital Signs (24h Range):  Temp:  [98 °F (36.7 °C)-98.4 °F (36.9 °C)] 98 °F (36.7 °C)  Pulse:  [48-85] 48  Resp:  [15-19] 19  SpO2:  [93 %-98 %] 98 %  BP: (120-179)/(58-93) 120/65     Weight: 129.3 kg (285 lb)  Body mass index is 36.59 kg/m².    Foot Exam    General  Orientation: alert and oriented to person, place, and time   Affect: appropriate       Right Foot/Ankle     Inspection and Palpation  Tenderness: (5th toe)  Skin Exam: drainage, cellulitis and ulcer;     Neurovascular  Dorsalis pedis: doppler  Posterior tibial: doppler  Saphenous nerve sensation: diminished  Tibial nerve sensation: diminished  Superficial peroneal nerve sensation: diminished  Deep peroneal nerve sensation: diminished  Sural nerve sensation: diminished      Left Foot/Ankle      Inspection and Palpation  Ecchymosis: none  Tenderness: none   Skin Exam: callus; no cellulitis and no ulcer      Neurovascular  Dorsalis pedis: doppler  Posterior tibial: doppler  Saphenous nerve sensation: diminished  Tibial nerve sensation: diminished  Superficial peroneal nerve sensation: diminished  Deep peroneal nerve sensation: diminished  Sural nerve sensation: diminished    Comments  1st ray amputationBlood Cultures:   Recent Labs   Lab 06/08/22 0036   LABBLOO No Growth to date  No Growth to date     CBC:   Recent Labs   Lab 06/08/22 0036   WBC 9.73   RBC 4.48*   HGB 13.6*   HCT 38.9*      MCV 87   MCH 30.4   MCHC 35.0     CMP:   Recent Labs   Lab 06/08/22 0036   *   CALCIUM 8.9   ALBUMIN 2.9*   PROT 7.2   *   K 4.7   CO2 20*   CL 97   BUN 26*   CREATININE 1.3   ALKPHOS 91   ALT 19   AST 17   BILITOT 0.5     CRP:   Recent Labs   Lab 06/08/22 0036   CRP 69.6*     ESR:   Recent Labs   Lab 06/08/22 0036   SEDRATE 78*     Microbiology Results (last 7 days)       Procedure Component Value Units Date/Time    Aerobic culture [[id number]] Collected: 06/08/22 1231    Order Status: Sent Specimen: Wound from Foot, Right Updated: 06/08/22 1300    Gram stain [[id number]] Collected: 06/08/22 1231    Order Status: Sent Specimen: Wound from Foot, Right Updated: 06/08/22 1259    Culture, Anaerobe [[id number]] Collected: 06/08/22 1231    Order Status: Sent Specimen: Wound from Foot, Right Updated: 06/08/22 1259    AFB Culture & Smear [[id number]] Collected: 06/08/22 1231    Order Status: Sent Specimen: Wound from Foot, Right Updated: 06/08/22 1259    Blood culture #1 **CANNOT BE ORDERED STAT** [[id number]] Collected: 06/08/22 0036    Order Status: Completed Specimen: Blood from Peripheral, Antecubital, Left Updated: 06/08/22 0745     Blood Culture, Routine No Growth to date    Blood culture #2 **CANNOT BE ORDERED STAT** [[id number]] Collected: 06/08/22 0036    Order Status: Completed Specimen: Blood from Peripheral, Antecubital, Left Updated: 06/08/22 0745     Blood Culture, Routine No Growth to date    Aerobic  culture [[id number]] Collected: 06/08/22 0206    Order Status: Sent Specimen: Wound from Toe, Right Foot Updated: 06/08/22 0211          Specimen (24h ago, onward)                None            Laboratory:  Blood Cultures:   Recent Labs   Lab 06/08/22 0036   LABBLOO No Growth to date  No Growth to date     CBC:   Recent Labs   Lab 06/08/22 0036   WBC 9.73   RBC 4.48*   HGB 13.6*   HCT 38.9*      MCV 87   MCH 30.4   MCHC 35.0     CMP:   Recent Labs   Lab 06/08/22 0036   *   CALCIUM 8.9   ALBUMIN 2.9*   PROT 7.2   *   K 4.7   CO2 20*   CL 97   BUN 26*   CREATININE 1.3   ALKPHOS 91   ALT 19   AST 17   BILITOT 0.5     CRP:   Recent Labs   Lab 06/08/22 0036   CRP 69.6*     ESR:   Recent Labs   Lab 06/08/22 0036   SEDRATE 78*     Microbiology Results (last 7 days)       Procedure Component Value Units Date/Time    Aerobic culture [[id number]] Collected: 06/08/22 1231    Order Status: Sent Specimen: Wound from Foot, Right Updated: 06/08/22 1300    Gram stain [[id number]] Collected: 06/08/22 1231    Order Status: Sent Specimen: Wound from Foot, Right Updated: 06/08/22 1259    Culture, Anaerobe [[id number]] Collected: 06/08/22 1231    Order Status: Sent Specimen: Wound from Foot, Right Updated: 06/08/22 1259    AFB Culture & Smear [[id number]] Collected: 06/08/22 1231    Order Status: Sent Specimen: Wound from Foot, Right Updated: 06/08/22 1259    Blood culture #1 **CANNOT BE ORDERED STAT** [[id number]] Collected: 06/08/22 0036    Order Status: Completed Specimen: Blood from Peripheral, Antecubital, Left Updated: 06/08/22 0745     Blood Culture, Routine No Growth to date    Blood culture #2 **CANNOT BE ORDERED STAT** [[id number]] Collected: 06/08/22 0036    Order Status: Completed Specimen: Blood from Peripheral, Antecubital, Left Updated: 06/08/22 0745     Blood Culture, Routine No Growth to date    Aerobic culture [[id number]] Collected: 06/08/22 0206    Order Status: Sent Specimen: Wound from Toe, Right  Foot Updated: 06/08/22 0211          Specimen (24h ago, onward)                None              Clinical Findings:  -Right foot with 5th toe dorsolateral wound, 2.5x1x1 cm, exposed tendon, exposed bone, necrotic base and periwound, 5th toe dusky, foul serous drainage, fluctuance. Cellulitis extending into foot.   -Right plantar forefoot wound, 1.5x1.2x0.4 cm, exposed subcutaneous tissue, periwound callus, viable wound bed post-debridement, not clinically infected.         Right lateral foot pre-debridement    Right lateral foot post-debridement    Right plantar foot pre-debridement     Right plantar foot post-debridement

## 2022-06-08 NOTE — ED NOTES
I have assumed care of the pt; pt's name and  verbally confirmed w/ pt, pt is Billy Quintana,  1965.     Pt awake and alert; resting quietly on stretcher.  Pt remains on continuous cardiac and pulse ox monitoring with non-invasive blood pressure to cycle every 30 minutes. VS stable; NSR noted. Pt denies pain at this time; no acute distress or discomfort reported or observed.  Pt denies restroom needs at this time; given a urinal. Pt is able to reposition self on stretcher. Bed locked in lowest position; side rails up and locked x 2; call light, bedside table, and personal belongings within reach. Room assessed for safety measures and cleanliness; no action needed at this time. Plan of care discussed, hospital medicine at bedside updating pt tx plan pending endocrinology consult. Pt instructed to alert nurse for assistance and before attempting to get out of bed; verbalizes understanding. Pt requests to use phone to update family, phone provided.

## 2022-06-08 NOTE — ASSESSMENT & PLAN NOTE
56 year old man with poorly controlled T2DM, HTN, CAD here for right 5th digit wound first noticed a few days prior to presentation. VSS, no leukocytosis. Initial ESR 78, CRP 70. Received cefepime in ED. Notified by Radiology that MRI shows osteomyelitis of the digit without abscess. Discussed finding with patient, who is open to amputation if necessary.    - Podiatry consulted regarding possible amputation and biopsy  - NPO and hold DVT ppx for now  - hold off on further abx while hemodynamically stable for biopsy yield  - f/u blood cultures, toe culture

## 2022-06-08 NOTE — SUBJECTIVE & OBJECTIVE
Interval HPI:   Overnight events: No acute events overnight. Patient on the Observation Unit in room OBS06/EDOU06. Blood glucose improving. BG at and above goal on current insulin regimen (Home Insulin Pump). Steroid use- None.    Renal function- Normal   Vasopressors-  None       Diet NPO     Eating:   NPO  Nausea: No  Hypoglycemia and intervention: No  Fever: No  TPN and/or TF: No    PMH, PSH, FH, SH updated and reviewed     ROS:  Review of Systems  Constitutional: Negative for weight changes.  Eyes: Negative for visual disturbance.  Respiratory: Negative for cough.   Cardiovascular: Negative for chest pain.  Gastrointestinal: Negative for nausea.  Endocrine: Negative for polyuria, polydipsia.  Musculoskeletal: Negative for back pain.  Skin: Negative for rash.  Neurological: Negative for syncope.  Psychiatric/Behavioral: Negative for depression.    Current Medications and/or Treatments Impacting Glycemic Control  Immunotherapy:    Immunosuppressants       None          Steroids:   Hormones (From admission, onward)                Start     Stop Route Frequency Ordered    06/08/22 0526  melatonin tablet 6 mg         -- Oral Nightly PRN 06/08/22 0428          Pressors:    Autonomic Drugs (From admission, onward)                None          Hyperglycemia/Diabetes Medications:   Antihyperglycemics (From admission, onward)                Start     Stop Route Frequency Ordered    06/08/22 1003  insulin aspart U-100 pen 1-10 Units         -- SubQ Every 6 hours PRN 06/08/22 0904    06/08/22 0915  insulin detemir U-100 pen 15 Units         -- SubQ 2 times daily 06/08/22 0904             PHYSICAL EXAMINATION:  Vitals:    06/08/22 0944   BP:    Pulse:    Resp:    Temp: 98 °F (36.7 °C)     Body mass index is 36.59 kg/m².    Physical Exam  Constitutional: Well developed, well nourished, NAD.  ENT: External ears no masses with nose patent; normal hearing.  Neck: Supple; trachea midline.  Cardiovascular: Normal heart sounds, no  LE edema. DP +2 bilaterally.  Lungs: Normal effort; lungs anterior bilaterally clear to auscultation.  Abdomen: Soft, no masses, no hernias.  MS: No clubbing or cyanosis of nails noted; normal gait or unable to assess gait.  Skin: No rashes, lesions, or ulcers; no nodules. Injection sites are ok. No lipo hypertropthy or atrophy.  Psychiatric: Good judgement and insight; normal mood and affect.  Neurological: Cranial nerves are grossly intact.   Foot: Nails in poor condition, amputation noted.

## 2022-06-08 NOTE — ED PROVIDER NOTES
Encounter Date: 6/7/2022       History     Chief Complaint   Patient presents with    Wound Check     Pt has an infected R pink toe and would like to get it checked out.      56-year-old male presents the for evaluation of infected right foot.  Patient reports he noted a blister on his pinky toe, then approximately 24 hours ago had ruptured.  Since then worsening discoloration and discharge which concerned him to come the ER.  No fever chills chest pain shortness of breath.         Review of patient's allergies indicates:   Allergen Reactions    Penicillins Other (See Comments)     PCN allergy as a child - was told he went into a coma. Tolerates Cefazolin without adverse reactions    Penicillin     Shellfish containing products      Other reaction(s): Unknown    Vancomycin Itching     Tolerated vancomycin 7/2020    Bactrim  [sulfamethoxazole-trimethoprim] Rash     Past Medical History:   Diagnosis Date    Allergy     CAD (coronary artery disease), native coronary artery 6/25/2013    Cancer     COVID-19 virus detected 9/1/2020    Diabetes mellitus     Diabetes mellitus, type 2     Disorder of kidney and ureter     Heart attack 04/2012    Hx of colon cancer, stage I     Hyperlipidemia     Hypertension     Muscular pain     post-op after colonoscopy    NSTEMI May 2013 - peak troponin 0.22 5/22/2013    MICHAELA (obstructive sleep apnea)     Retinopathy due to secondary diabetes      Past Surgical History:   Procedure Laterality Date    COLONOSCOPY N/A 2/17/2017    Procedure: COLONOSCOPY;  Surgeon: Simon Gomez MD;  Location: [facility] (4TH FLR);  Service: Endoscopy;  Laterality: N/A;    CORONARY ANGIOPLASTY      INCISION AND DRAINAGE FOOT Right 6/5/2018    Procedure: INCISION AND DRAINAGE, FOOT;  Surgeon: Bridget Santana DPM;  [facility];  Service: Podiatry;  Laterality: Right;  Request mini C arm in room. request wound vac with medium black sponge    INCISION AND DRAINAGE FOOT  Right 6/7/2018    Procedure: INCISION AND DRAINAGE, FOOT;  Surgeon: Emelia Brock DPM;  Location: [facility] OR 2ND FLR;  Service: Podiatry;  Laterality: Right;    INCISION AND DRAINAGE FOOT Left 9/4/2020    Procedure: INCISION AND DRAINAGE, FOOT;  Surgeon: Ainsley Powell DPM;  Location: [facility] OR 2ND FLR;  Service: Podiatry;  Laterality: Left;    INCISION AND DRAINAGE FOOT Left 12/22/2020    Procedure: INCISION AND DRAINAGE, FOOT;  Surgeon: Ainsley Powell DPM;  Location: [facility] OR 2ND FLR;  Service: Podiatry;  Laterality: Left;  May need micro choice  Need Jam shidi needles  Stretcher OK      INCISION AND DRAINAGE OF ABSCESS Right 6/2/2018    Procedure: INCISION AND DRAINAGE, ABSCESS;  Surgeon: Bridget Santana DPM;  [facility];  Service: General;  Laterality: Right;    OSTEOTOMY OF METATARSAL BONE  9/4/2020    Procedure: OSTEOTOMY, METATARSAL BONE, 1st METATARSAL RESECTION;  Surgeon: Ainsley Powell DPM;  [facility];  Service: Podiatry;;    REMOVAL OF FOREIGN BODY FROM FOOT Right 6/7/2018    Procedure: REMOVAL, FOREIGN BODY, FOOT;  Surgeon: Emelia Brock DPM;  Location: [facility] OR 2ND FLR;  Service: Podiatry;  Laterality: Right;    TOE AMPUTATION Left 7/4/2020    Procedure: AMPUTATION, TOE;  Surgeon: Bridget Santana DPM;  [facility];  Service: Podiatry;  Laterality: Left;    TOE AMPUTATION Left 10/14/2020    Procedure: AMPUTATION, TOE;  Surgeon: Mingo Melara DPM;  Location: [facility] OR 2ND FLR;  Service: Podiatry;  Laterality: Left;  stretcher OK    TONSILLECTOMY       Family History   Problem Relation Age of Onset    Heart disease Mother     Diabetes Mother     Diabetes Father     Heart disease Brother     Diabetes Maternal Grandmother     Diabetes Maternal Grandfather     Diabetes Paternal Grandmother     Diabetes Paternal Grandfather     Anesthesia problems Neg Hx      Social History     Tobacco Use    Smoking status: Never Smoker    Smokeless tobacco:  Never Used   Substance Use Topics    Alcohol use: Yes     Comment: rare x1 beer-     Drug use: No     Review of Systems   Constitutional: Negative for fatigue and fever.   Respiratory: Negative for shortness of breath.    Cardiovascular: Negative for chest pain.   Gastrointestinal: Negative for abdominal pain.   Skin: Positive for rash and wound.   All other systems reviewed and are negative.      Physical Exam     Initial Vitals [06/07/22 2316]   BP Pulse Resp Temp SpO2   (!) 162/77 85 18 98.4 °F (36.9 °C) 98 %      MAP       --         Physical Exam    Constitutional: He appears well-developed and well-nourished.   HENT:   Head: Normocephalic and atraumatic.   Eyes: Pupils are equal, round, and reactive to light.   Neck:   Normal range of motion.  Cardiovascular: Normal rate and regular rhythm.   Pulmonary/Chest: Breath sounds normal. No respiratory distress.   Abdominal: Abdomen is soft. He exhibits no distension. There is no abdominal tenderness.   Musculoskeletal:      Cervical back: Normal range of motion.     Neurological: He is alert and oriented to person, place, and time. He has normal strength. GCS score is 15. GCS eye subscore is 4. GCS verbal subscore is 5. GCS motor subscore is 6.   Skin: Skin is warm and dry. Capillary refill takes less than 2 seconds.                 Right foot:  There are proximally 2 ulcerative lesions on 5th digit right toe, mild purulent discharge, there is warmth, delayed cap refill findings concerning for infection/osteo    ED Course   Procedures  Labs Reviewed   CBC W/ AUTO DIFFERENTIAL - Abnormal; Notable for the following components:       Result Value    RBC 4.48 (*)     Hemoglobin 13.6 (*)     Hematocrit 38.9 (*)     Immature Granulocytes 0.7 (*)     Immature Grans (Abs) 0.07 (*)     Lymph % 16.2 (*)     All other components within normal limits   COMPREHENSIVE METABOLIC PANEL - Abnormal; Notable for the following components:    Sodium 129 (*)     CO2 20 (*)     Glucose  415 (*)     BUN 26 (*)     Albumin 2.9 (*)     All other components within normal limits   C-REACTIVE PROTEIN - Abnormal; Notable for the following components:    CRP 69.6 (*)     All other components within normal limits   SEDIMENTATION RATE - Abnormal; Notable for the following components:    Sed Rate 78 (*)     All other components within normal limits   CULTURE, AEROBIC  (SPECIFY SOURCE)   CULTURE, BLOOD   CULTURE, BLOOD   SARS-COV-2 RDRP GENE    Narrative:     This test utilizes isothermal nucleic acid amplification   technology to detect the SARS-CoV-2 RdRp nucleic acid segment.   The analytical sensitivity (limit of detection) is 125 genome   equivalents/mL.   A POSITIVE result implies infection with the SARS-CoV-2 virus;   the patient is presumed to be contagious.     A NEGATIVE result means that SARS-CoV-2 nucleic acids are not   present above the limit of detection. A NEGATIVE result should be   treated as presumptive. It does not rule out the possibility of   COVID-19 and should not be the sole basis for treatment decisions.   If COVID-19 is strongly suspected based on clinical and exposure   history, re-testing using an alternate molecular assay should be   considered.   This test is only for use under the Food and Drug   Administration s Emergency Use Authorization (EUA).   Commercial kits are provided by [employer].   Performance characteristics of the EUA have been independently   verified by [facility] Department of   Pathology and Laboratory Medicine.   _________________________________________________________________   The authorized Fact Sheet for Healthcare Providers and the authorized Fact   Sheet for Patients of the ID NOW COVID-19 are available on the FDA   website:     https://www.fda.gov/media/831964/download  https://www.fda.gov/media/950089/download          TYPE & SCREEN          Imaging Results          MRI Foot (Forefoot) Right Without Contrast (In process)                  X-Ray Foot Complete Right (Final result)  Result time 06/08/22 03:02:11    Final result by Mingo Adan MD (06/08/22 03:02:11)                 Impression:      Abnormal appearance of the soft tissues of the 5th digit most concerning for infectious process, including air density within the soft tissues.    Abnormality involving the lateral aspect of the distal portion of the proximal phalanx of the 5th digit, most concerning for osseous infectious involvement, osteomyelitis for which clinical and historical correlation is needed.    This report was flagged in Epic as abnormal.      Electronically signed by: Mingo Adan  Date:    06/08/2022  Time:    03:02             Narrative:    EXAMINATION:  XR FOOT COMPLETE 3 VIEW RIGHT    CLINICAL HISTORY:  . Local infection of the skin and subcutaneous tissue, unspecified    TECHNIQUE:  AP, lateral, and oblique views of the right foot were performed.    COMPARISON:  Radiograph right foot June 7, 2018    FINDINGS:  There is abnormal appearance of the soft tissues of the 5th digit of the right foot, irregular appearance of the soft tissues as well as appearance of air density within the soft tissues, most concerning for infectious process given the history.  In addition there is abnormal appearance involving the lateral aspect of the distal portion of the proximal phalanx of the 5th digit, the appearance of which is most concerning for osseous infectious involvement.    The remainder of the visualized osseous structures appear intact, chronic appearing changes are noted there is no additional evidence for acute fracture or dislocation.  Soft tissue densities may relate to soft tissue calcifications and appears similar to the prior study.                                 Medications   diphenhydrAMINE capsule 25 mg (0 mg Oral Hold 6/8/22 0100)   sodium chloride 0.9% flush 10 mL (has no administration in time range)   naloxone 0.4 mg/mL injection 0.02 mg (has no  administration in time range)   glucose chewable tablet 16 g (has no administration in time range)   glucose chewable tablet 24 g (has no administration in time range)   glucagon (human recombinant) injection 1 mg (has no administration in time range)   dextrose 10% bolus 125 mL (has no administration in time range)   dextrose 10% bolus 250 mL (has no administration in time range)   senna-docusate 8.6-50 mg per tablet 1 tablet (has no administration in time range)   melatonin tablet 6 mg (has no administration in time range)   aspirin EC tablet 81 mg (has no administration in time range)   atorvastatin tablet 80 mg (has no administration in time range)   carvediloL tablet 12.5 mg (has no administration in time range)   lisinopriL tablet 40 mg (has no administration in time range)   cefepime in dextrose 5 % IVPB 2 g (0 g Intravenous Stopped 6/8/22 0129)   sodium chloride 0.9% bolus 1,000 mL (0 mLs Intravenous Stopped 6/8/22 0251)     Medical Decision Making:   Initial Assessment:   56-year-old male presents the ER for evaluation infected right pinky toe.  Onset 2 days no other systemic signs.  Concern for acute osteo will plan blood work x-ray symptomatic support likely admission.             ED Course as of 06/08/22 0521 Wed Jun 08, 2022   0302 Resting in bed no acute distress.  X-ray physical exam findings concerning osteo.  Appears to be breakdown cortex digit surface.  Discussed with medicine team requests to hold off antibiotics obtain mri and admit to their service.  Patient understands agrees with plan. [SE]      ED Course User Index  [SE] Mp Mckeon MD             Clinical Impression:   Final diagnoses:  [Z01.810] Preop cardiovascular exam  [L08.9] Foot infection  [L08.9] Foot infection - 5th digit concern for osteo  [M86.9] Toe osteomyelitis, right (Primary)          ED Disposition Condition    Admit               Mp Mckeon MD  06/08/22 0521

## 2022-06-08 NOTE — PLAN OF CARE
Problem: Adult Inpatient Plan of Care  Goal: Plan of Care Review  Outcome: Ongoing, Progressing  Goal: Patient-Specific Goal (Individualized)  Outcome: Ongoing, Progressing  Goal: Absence of Hospital-Acquired Illness or Injury  Outcome: Ongoing, Progressing  Goal: Optimal Comfort and Wellbeing  Outcome: Ongoing, Progressing     Problem: Diabetes Comorbidity  Goal: Blood Glucose Level Within Targeted Range  Outcome: Ongoing, Progressing     Problem: Impaired Wound Healing  Goal: Optimal Wound Healing  Outcome: Ongoing, Progressing     AAOx4. New admit this afternoon from ED dx for osteomyelitis of right foot. NPO after midnight for possible procedure tomorrow for right pinky toe amputation. Blood sugar closely monitored. Pt down at u/s. Instructed to call for assistance

## 2022-06-08 NOTE — PROCEDURES
Billy Rivera is a 56 y.o. male patient.    Temp: 98 °F (36.7 °C) (06/08/22 0944)  Pulse: (!) 48 (06/08/22 1236)  Resp: 19 (06/08/22 1106)  BP: 120/65 (06/08/22 1236)  SpO2: 98 % (06/08/22 1236)  Weight: 129.3 kg (285 lb) (06/07/22 2316)       Debridement    Date/Time: 6/8/2022 12:00 PM  Performed by: Josiah Saunders MD  Authorized by: Josiah Saunders MD     Consent Done?:  Yes (Verbal)  Local anesthesia used?: No      Wound Details:    Location:  Right foot    Location:  Right 5th Toe    Type of Debridement:  Excisional       Length (cm):  2.5       Area (sq cm):  2.5       Width (cm):  1       Percent Debrided (%):  80       Depth (cm):  1       Total Area Debrided (sq cm):  2    Depth of debridement:  Bone    Tissue debrided:  Adipose, Dermis, Epidermis, Subcutaneous, Joint Capsule and Ligament    Devitalized tissue debrided:  Biofilm, Exudate, Slough and Necrotic/Eschar    Instruments:  Curette    Additional wounds:  1    2nd Wound Details:     Location:  Right foot    Location:  Right Plantar    Location:  Right Plantar    Type of Debridement:  Excisional       Length (cm):  1.5       Area (sq cm):  1.8       Width (cm):  1.2       Percent Debrided (%):  100       Depth (cm):  0.4       Total Area Debrided (sq cm):  1.8    Depth of debridement:  Subcutaneous tissue    Tissue debrided:  Subcutaneous, Adipose, Dermis and Epidermis    Devitalized tissue debrided:  Biofilm and Callus    Instruments:  Curette    Bleeding:  Minimal  Hemostasis Achieved: Yes    Method Used:  Pressure  Patient tolerance:  Patient tolerated the procedure well with no immediate complications     Right 5th toe cultured        6/8/2022

## 2022-06-08 NOTE — SUBJECTIVE & OBJECTIVE
Past Medical History:   Diagnosis Date    Allergy     CAD (coronary artery disease), native coronary artery 6/25/2013    Cancer     COVID-19 virus detected 9/1/2020    Diabetes mellitus     Diabetes mellitus, type 2     Disorder of kidney and ureter     Heart attack 04/2012    Hx of colon cancer, stage I     Hyperlipidemia     Hypertension     Muscular pain     post-op after colonoscopy    NSTEMI May 2013 - peak troponin 0.22 5/22/2013    MICHAELA (obstructive sleep apnea)     Retinopathy due to secondary diabetes        Past Surgical History:   Procedure Laterality Date    COLONOSCOPY N/A 2/17/2017    Procedure: COLONOSCOPY;  Surgeon: Simon Gomez MD;  Location: [facility] ENDO (4TH FLR);  Service: Endoscopy;  Laterality: N/A;    CORONARY ANGIOPLASTY      INCISION AND DRAINAGE FOOT Right 6/5/2018    Procedure: INCISION AND DRAINAGE, FOOT;  Surgeon: Bridget Santana DPM;  [facility];  Service: Podiatry;  Laterality: Right;  Request mini C arm in room. request wound vac with medium black sponge    INCISION AND DRAINAGE FOOT Right 6/7/2018    Procedure: INCISION AND DRAINAGE, FOOT;  Surgeon: Emelia Brock DPM;  Location: [facility] OR 2ND FLR;  Service: Podiatry;  Laterality: Right;    INCISION AND DRAINAGE FOOT Left 9/4/2020    Procedure: INCISION AND DRAINAGE, FOOT;  Surgeon: Ainsley Powell DPM;  [facility];  Service: Podiatry;  Laterality: Left;    INCISION AND DRAINAGE FOOT Left 12/22/2020    Procedure: INCISION AND DRAINAGE, FOOT;  Surgeon: Ainsley Powell DPM;  Location: [facility] OR [address] [place] [address];  Service: Podiatry;  Laterality: Left;  May need micro choice  Need Jam shidi needles  Stretcher OK      INCISION AND DRAINAGE OF ABSCESS Right 6/2/2018    Procedure: INCISION AND DRAINAGE, ABSCESS;  Surgeon: Bridget Santana DPM;  Location: [facility] OR [address] [place] [address];  Service: General;  Laterality: Right;    OSTEOTOMY OF METATARSAL BONE  9/4/2020    Procedure: OSTEOTOMY, METATARSAL BONE, 1st METATARSAL RESECTION;   Surgeon: Ainsley Powell DPM;  [facility];  Service: Podiatry;;    REMOVAL OF FOREIGN BODY FROM FOOT Right 6/7/2018    Procedure: REMOVAL, FOREIGN BODY, FOOT;  Surgeon: Emelia Brock DPM;  Location: [facility] OR 2ND FLR;  Service: Podiatry;  Laterality: Right;    TOE AMPUTATION Left 7/4/2020    Procedure: AMPUTATION, TOE;  Surgeon: Bridget Santana DPM;  Location: [facility] OR 2ND FLR;  Service: Podiatry;  Laterality: Left;    TOE AMPUTATION Left 10/14/2020    Procedure: AMPUTATION, TOE;  Surgeon: Mingo Melara DPM;  [facility];  Service: Podiatry;  Laterality: Left;  stretcher OK    TONSILLECTOMY         Review of patient's allergies indicates:   Allergen Reactions    Penicillins Other (See Comments)     PCN allergy as a child - was told he went into a coma. Tolerates Cefazolin without adverse reactions    Penicillin     Shellfish containing products      Other reaction(s): Unknown    Vancomycin Itching     Tolerated vancomycin 7/2020    Bactrim  [sulfamethoxazole-trimethoprim] Rash       No current facility-administered medications on file prior to encounter.     Current Outpatient Medications on File Prior to Encounter   Medication Sig    acetaminophen (TYLENOL) 325 MG tablet Take 325 mg by mouth every 6 (six) hours as needed for Pain.    aspirin (ECOTRIN) 81 MG EC tablet Take 1 tablet (81 mg total) by mouth once daily.    aspirin/salicylamide/caffeine (BC HEADACHE POWDER ORAL) Take 1 Package by mouth daily as needed (pain).    atorvastatin (LIPITOR) 80 MG tablet Take 1 tablet (80 mg total) by mouth once daily. Take every night.    blood sugar diagnostic (ACCU-CHEK GUIDE TEST STRIPS) Strp 1 each by Misc.(Non-Drug; Combo Route) route 5 (five) times daily.    carvediloL (COREG) 12.5 MG tablet Take 1 tablet (12.5 mg total) by mouth 2 (two) times daily.    clotrimazole-betamethasone 1-0.05% (LOTRISONE) cream Apply topically 2 (two) times daily.    flash glucose sensor (Third Chicken MICHAEL 2  "SENSOR) Kit 1 each by Misc.(Non-Drug; Combo Route) route every 14 (fourteen) days.    ibuprofen (ADVIL,MOTRIN) 200 MG tablet Take 200 mg by mouth every 6 (six) hours as needed for Pain.    insulin (LANTUS SOLOSTAR U-100 INSULIN) glargine 100 units/mL (3mL) SubQ pen Inject 60 Units into the skin once daily.    insulin lispro (HUMALOG U-100 INSULIN) 100 unit/mL injection Inject 100 Units into the skin continuous. Gives via insulin pump only. Do not Directly inject.    lancets (ACCU-CHEK FASTCLIX LANCET DRUM) Misc 1 each by Misc.(Non-Drug; Combo Route) route Daily.    lisinopriL (PRINIVIL,ZESTRIL) 40 MG tablet Take 1 tablet by mouth once daily    melatonin (MELATIN) 3 mg tablet Take 2 tablets (6 mg total) by mouth nightly as needed for Insomnia.    metFORMIN (GLUCOPHAGE) 1000 MG tablet Take 1 tablet (1,000 mg total) by mouth 2 (two) times daily with meals.    MINIMED 770G INSULIN PUMP Misc 1 each by Misc.(Non-Drug; Combo Route) route continuous. Medically necessary for management of Type 2 diabetes, E11.65    pen needle, diabetic 31 gauge x 3/16" Ndle Inject 1 each into the skin 3 (three) times daily before meals. (Patient taking differently: Inject 1 each into the skin 4 (four) times daily.)     Family History       Problem Relation (Age of Onset)    Diabetes Mother, Father, Maternal Grandmother, Maternal Grandfather, Paternal Grandmother, Paternal Grandfather    Heart disease Mother, Brother          Tobacco Use    Smoking status: Never Smoker    Smokeless tobacco: Never Used   Substance and Sexual Activity    Alcohol use: Yes     Comment: rare x1 beer-     Drug use: No    Sexual activity: Not Currently     Review of Systems   Constitutional:  Negative for chills and fever.   HENT:  Negative for rhinorrhea and sneezing.    Eyes:  Negative for pain and redness.   Respiratory:  Negative for cough and shortness of breath.    Gastrointestinal:  Negative for abdominal pain and nausea.   Genitourinary:  Negative for " difficulty urinating and dysuria.   Musculoskeletal:  Negative for back pain and neck pain.   Skin:  Positive for color change and wound.   Neurological:  Negative for tremors and speech difficulty.   Psychiatric/Behavioral:  Negative for agitation and confusion.    Objective:     Vital Signs (Most Recent):  Temp: 98 °F (36.7 °C) (06/08/22 0436)  Pulse: 62 (06/08/22 0436)  Resp: 15 (06/08/22 0436)  BP: (!) 161/82 (06/08/22 0436)  SpO2: 97 % (06/08/22 0436)   Vital Signs (24h Range):  Temp:  [98 °F (36.7 °C)-98.4 °F (36.9 °C)] 98 °F (36.7 °C)  Pulse:  [62-85] 62  Resp:  [15-18] 15  SpO2:  [96 %-98 %] 97 %  BP: (152-179)/(77-93) 161/82     Weight: 129.3 kg (285 lb)  Body mass index is 36.59 kg/m².    Physical Exam  Vitals and nursing note reviewed.   Constitutional:       General: He is not in acute distress.  HENT:      Head: Normocephalic and atraumatic.      Nose: Nose normal. No rhinorrhea.      Mouth/Throat:      Mouth: Mucous membranes are moist.      Pharynx: Oropharynx is clear.   Eyes:      Extraocular Movements: Extraocular movements intact.      Conjunctiva/sclera: Conjunctivae normal.   Cardiovascular:      Rate and Rhythm: Normal rate and regular rhythm.      Heart sounds: No murmur heard.  Pulmonary:      Effort: Pulmonary effort is normal. No respiratory distress.      Breath sounds: Normal breath sounds.   Abdominal:      Palpations: Abdomen is soft.      Tenderness: There is no abdominal tenderness.   Musculoskeletal:      Right lower leg: No edema.      Left lower leg: No edema.      Comments: Amputated left first toe.  See photo of right fifth toe below.   Skin:     General: Skin is warm and dry.      Capillary Refill: Capillary refill takes less than 2 seconds.   Neurological:      General: No focal deficit present.      Mental Status: He is alert and oriented to person, place, and time.   Psychiatric:         Mood and Affect: Mood normal.         Behavior: Behavior normal.                  Significant Labs: All pertinent labs within the past 24 hours have been reviewed.    Significant Imaging: I have reviewed all pertinent imaging results/findings within the past 24 hours.

## 2022-06-08 NOTE — PHARMACY MED REC
"Admission Medication History     The home medication history was taken by Malcolm Davison.    You may go to "Admission" then "Reconcile Home Medications" tabs to review and/or act upon these items.      The home medication list has been updated by the Pharmacy department.    Please read ALL comments highlighted in yellow.    Please address this information as you see fit.     Feel free to contact us if you have any questions or require assistance.      The medications listed below were removed from the home medication list. Please reorder if appropriate:  Patient reports no longer taking the following medication(s):   CLOTRIMAZOLE-BETAMETHASONE 1-0.05 % (LOTRISONE) CRM   MELATONIN 3 MG     Medications listed below were obtained from: Patient/family     Current Outpatient Medications on File Prior to Encounter   Medication Sig    acetaminophen (TYLENOL) 325 MG tablet   Take 325 mg by mouth every 6 (six) hours as needed for Pain.    ascorbic acid, vitamin C, (VITAMIN C) 250 MG tablet   Take 500 mg by mouth once daily.    aspirin (ECOTRIN) 81 MG EC tablet   Take 1 tablet (81 mg total) by mouth once daily.    aspirin/salicylamide/caffeine (BC HEADACHE POWDER ORAL)   Take 1 Package by mouth daily as needed (pain).    atorvastatin (LIPITOR) 80 MG tablet   Take 1 tablet (80 mg total) by mouth once daily. Take every night.    bismuth subsalicylate (PEPTO-BISMOL) 262 mg/15 mL suspension   Take 15 mLs by mouth every other day. (As needed for diarrhea)    carvediloL (COREG) 12.5 MG tablet   Take 12.5 mg by mouth once daily.    ibuprofen (ADVIL,MOTRIN) 200 MG tablet   Take 400-600 mg by mouth daily as needed for Pain.    insulin lispro (HUMALOG U-100 INSULIN) 100 unit/mL injection   Inject 100 Units into the skin continuous. Gives via insulin pump only. Do not Directly inject.    lisinopriL (PRINIVIL,ZESTRIL) 40 MG tablet   Take 1 tablet by mouth once daily.    metFORMIN (GLUCOPHAGE) 1000 MG tablet   Take 1 tablet " "(1,000 mg total) by mouth 2 (two) times daily with meals.    multivit-min/folic/vit K/lycop (MEN'S MULTIVITAMIN ORAL)   Take 1 tablet by mouth once daily.    blood sugar diagnostic (ACCU-CHEK GUIDE TEST STRIPS) Strp   1 each by Misc.(Non-Drug; Combo Route) route 5 (five) times daily.    flash glucose sensor (FREESTYLE MICHAEL 2 SENSOR) Kit   1 each by Misc.(Non-Drug; Combo Route) route every 14 (fourteen) days.    lancets (ACCU-CHEK FASTCLIX LANCET DRUM) Misc   1 each by Misc.(Non-Drug; Combo Route) route Daily.    MINIMED 770G INSULIN PUMP Misc   1 each by Misc.(Non-Drug; Combo Route) route continuous. Medically necessary for management of Type 2 diabetes, E11.65    pen needle, diabetic 31 gauge x 3/16" Ndle IInject 1 each into the skin 4 (four) times daily.       Potential issues to be addressed PRIOR TO DISCHARGE  Patient reported not taking the following medication: (LANTUS SOLOSTAR). This medication remain on the home medication list. Please address accordingly.     Malcolm Davison CPhT  EXT [phone]                    .          "

## 2022-06-08 NOTE — CONSULTS
"[facility] - Emergency Dept  Endocrinology  Diabetes Consult Note    Consult Requested by: Yasmin Low MD   Reason for admit: Osteomyelitis of right foot    HISTORY OF PRESENT ILLNESS:  Reason for Consult: Management of T2DM, Hyperglycemia      Diabetes diagnosis year:   "Around 39 years old"     Home Diabetes Medications:    Metformin 1000 mg BID and Trulicity 1.5 mg weekly.   Medtronic 770G  Pump Settings:  Basal 1.9 units/hr  ICR: 1:56 (patient uses pre-set bolus parameters 5 units for snack; 10 units for small meals; 15 units for large meals)  ISF 1:25  Target B-120 mg/dL  IOB: 4 hours     How often checking glucose at home? 1-3 x day   BG readings on regimen: 200's- 300's  Hypoglycemia on the regimen?  No  Missed doses on regimen?  No     Diabetes Complications include:     Hyperglycemia, Diabetic retinopathy , Foot ulcer. Foot wound,   and Other skin ulcer     Complicating diabetes co morbidities:   History of MI and MICHAELA, CAD, HTN, HLD,         HPI: Billy Rivera is a 56 year old man with T2DM (last A1c 12), HTN, CAD, MICHAELA not on CPAP who presented for right fifth toe wound. He first noticed the wound 3 days ago while changing the bandage, then on day of presentation noticed that a blister had ruptured and that it looked worse. Saw clear drainage. No fevers, chills, nausea, or foot pain. Works as a . Lives with his wife. Ambulates without assistive device, independent for ADLs. In ED VSS, labs notable for ESR 78 and CRP 70. No leukocytosis. Blood cultures drawn, toe culture sent. Received dose of cefepime and 1L NS. XR showed cortical breakdown. MRI ordered. Endocrine consulted to manage hyperglycemia, type 2 diabetes, and insulin pump therapy.     Lab Results   Component Value Date    HGBA1C 10.0 (H) 2022           Interval HPI:   Overnight events: No acute events overnight. Patient on the Observation Unit in room OBS06/EDOU06. Blood glucose improving. BG at and above " goal on current insulin regimen (Home Insulin Pump). Steroid use- None.    Renal function- Normal   Vasopressors-  None       Diet NPO     Eating:   NPO  Nausea: No  Hypoglycemia and intervention: No  Fever: No  TPN and/or TF: No    PMH, PSH, FH, SH updated and reviewed     ROS:  Review of Systems  Constitutional: Negative for weight changes.  Eyes: Negative for visual disturbance.  Respiratory: Negative for cough.   Cardiovascular: Negative for chest pain.  Gastrointestinal: Negative for nausea.  Endocrine: Negative for polyuria, polydipsia.  Musculoskeletal: Negative for back pain.  Skin: Negative for rash.  Neurological: Negative for syncope.  Psychiatric/Behavioral: Negative for depression.    Current Medications and/or Treatments Impacting Glycemic Control  Immunotherapy:    Immunosuppressants       None          Steroids:   Hormones (From admission, onward)                Start     Stop Route Frequency Ordered    06/08/22 0526  melatonin tablet 6 mg         -- Oral Nightly PRN 06/08/22 0428          Pressors:    Autonomic Drugs (From admission, onward)                None          Hyperglycemia/Diabetes Medications:   Antihyperglycemics (From admission, onward)                Start     Stop Route Frequency Ordered    06/08/22 1003  insulin aspart U-100 pen 1-10 Units         -- SubQ Every 6 hours PRN 06/08/22 0904    06/08/22 0915  insulin detemir U-100 pen 15 Units         -- SubQ 2 times daily 06/08/22 0904             PHYSICAL EXAMINATION:  Vitals:    06/08/22 0944   BP:    Pulse:    Resp:    Temp: 98 °F (36.7 °C)     Body mass index is 36.59 kg/m².    Physical Exam  Constitutional: Well developed, well nourished, NAD.  ENT: External ears no masses with nose patent; normal hearing.  Neck: Supple; trachea midline.  Cardiovascular: Normal heart sounds, no LE edema. DP +2 bilaterally.  Lungs: Normal effort; lungs anterior bilaterally clear to auscultation.  Abdomen: Soft, no masses, no hernias.  MS: No  clubbing or cyanosis of nails noted; normal gait or unable to assess gait.  Skin: No rashes, lesions, or ulcers; no nodules. Injection sites are ok. No lipo hypertropthy or atrophy.  Psychiatric: Good judgement and insight; normal mood and affect.  Neurological: Cranial nerves are grossly intact.   Foot: Nails in poor condition, amputation noted.        Labs Reviewed and Include   Recent Labs   Lab 06/08/22  0036   *   CALCIUM 8.9   ALBUMIN 2.9*   PROT 7.2   *   K 4.7   CO2 20*   CL 97   BUN 26*   CREATININE 1.3   ALKPHOS 91   ALT 19   AST 17   BILITOT 0.5     Lab Results   Component Value Date    WBC 9.73 06/08/2022    HGB 13.6 (L) 06/08/2022    HCT 38.9 (L) 06/08/2022    MCV 87 06/08/2022     06/08/2022     No results for input(s): TSH, FREET4 in the last 168 hours.  Lab Results   Component Value Date    HGBA1C 10.0 (H) 06/08/2022       Nutritional status:   Body mass index is 36.59 kg/m².  Lab Results   Component Value Date    ALBUMIN 2.9 (L) 06/08/2022    ALBUMIN 3.6 01/26/2022    ALBUMIN 3.5 05/25/2021     Lab Results   Component Value Date    PREALBUMIN 22 07/16/2018    PREALBUMIN 21 07/09/2018    PREALBUMIN 20 07/02/2018       Estimated Creatinine Clearance: 89.6 mL/min (based on SCr of 1.3 mg/dL).    Accu-Checks  Recent Labs     06/08/22  0547 06/08/22  0808   POCTGLUCOSE 290* 257*        ASSESSMENT and PLAN    * Osteomyelitis of right foot  Managed per primary team  Avoid hypoglycemia        Type 2 diabetes mellitus with diabetic peripheral angiopathy without gangrene, with long-term current use of insulin  Endocrinology consulted for BG management.   BG goal 140-180      20% reduction in insulin pump settings   - Levemir Flex Pen 15 units BID  - Novolog (aspart) insulin 10 Units SQ TIDWM and prn for BG excursions MDC (150/25)  - BG checks q4hr  - Hypoglycemia protocol in place      ** Please notify Endocrine for any change and/or advance in diet**  ** Please call Endocrine for any BG  related issues **    Discharge Planning:   TBD. Please notify endocrinology prior to discharge.        Sleep apnea  May affect BG readings if uncontrolled        Ulcer of right foot with fat layer exposed  Optimize BG control to improve wound healing            Plan discussed with patient, family, and RN at bedside.      Kenneth Hughes, DNP, FNP  Endocrinology  [facility] - Emergency Dept

## 2022-06-08 NOTE — PROGRESS NOTES
Pharmacokinetic Initial Assessment: IV Vancomycin    Assessment/Plan:    - Vancomycin 2500 mg x1 followed by vancomycin 1750 mg q12h   - Goal trough 15-20 mcg/mL  - Stable renal function, Scr 1.3 mg/dL  - Obtain trough on 6/9 @ 2300    Pharmacy will continue to follow and monitor vancomycin.      Please contact pharmacy at extension [phone] with any questions regarding this assessment.     Thank you for the consult,   Julia Yeondam Roh       Patient brief summary:  Billy Rivera is a 56 y.o. male initiated on antimicrobial therapy with IV Vancomycin for treatment of suspected bone/joint infection    Drug Allergies:   Review of patient's allergies indicates:   Allergen Reactions    Penicillins Other (See Comments)     PCN allergy as a child - was told he went into a coma. Tolerates Cefazolin without adverse reactions    Penicillin     Shellfish containing products      Other reaction(s): Unknown    Vancomycin Itching     Tolerated vancomycin 7/2020    Bactrim  [sulfamethoxazole-trimethoprim] Rash       Actual Body Weight:   129.3 kg     Renal Function:   Estimated Creatinine Clearance: 89.6 mL/min (based on SCr of 1.3 mg/dL).,     CBC (last 72 hours):  Recent Labs   Lab Result Units 06/08/22  0036   WBC K/uL 9.73   Hemoglobin g/dL 13.6*   Hemoglobin A1C % 10.0*   Hematocrit % 38.9*   Platelets K/uL 277   Gran % % 70.4   Lymph % % 16.2*   Mono % % 9.8   Eosinophil % % 2.5   Basophil % % 0.4   Differential Method  Automated       Metabolic Panel (last 72 hours):  Recent Labs   Lab Result Units 06/08/22  0036   Sodium mmol/L 129*   Potassium mmol/L 4.7   Chloride mmol/L 97   CO2 mmol/L 20*   Glucose mg/dL 415*   BUN mg/dL 26*   Creatinine mg/dL 1.3   Albumin g/dL 2.9*   Total Bilirubin mg/dL 0.5   Alkaline Phosphatase U/L 91   AST U/L 17   ALT U/L 19       Drug levels (last 3 results):  No results for input(s): VANCOMYCINRA, VANCORANDOM, VANCOMYCINPE, VANCOPEAK, VANCOMYCINTR, VANCOTROUGH in the last 72  hours.    Microbiologic Results:  Microbiology Results (last 7 days)     Procedure Component Value Units Date/Time    Aerobic culture [[id number]] Collected: 06/08/22 1231    Order Status: Sent Specimen: Wound from Foot, Right Updated: 06/08/22 1300    Gram stain [[id number]] Collected: 06/08/22 1231    Order Status: Sent Specimen: Wound from Foot, Right Updated: 06/08/22 1259    Culture, Anaerobe [[id number]] Collected: 06/08/22 1231    Order Status: Sent Specimen: Wound from Foot, Right Updated: 06/08/22 1259    AFB Culture & Smear [[id number]] Collected: 06/08/22 1231    Order Status: Sent Specimen: Wound from Foot, Right Updated: 06/08/22 1259    Blood culture #1 **CANNOT BE ORDERED STAT** [[id number]] Collected: 06/08/22 0036    Order Status: Completed Specimen: Blood from Peripheral, Antecubital, Left Updated: 06/08/22 0745     Blood Culture, Routine No Growth to date    Blood culture #2 **CANNOT BE ORDERED STAT** [[id number]] Collected: 06/08/22 0036    Order Status: Completed Specimen: Blood from Peripheral, Antecubital, Left Updated: 06/08/22 0745     Blood Culture, Routine No Growth to date    Aerobic culture [[id number]] Collected: 06/08/22 0206    Order Status: Sent Specimen: Wound from Toe, Right Foot Updated: 06/08/22 0211

## 2022-06-08 NOTE — ED TRIAGE NOTES
Billy Rivera, an 56 y.o. male presents to the ED c/o R pinky toe wound; R pinky toe discolored with open wound, older wound noted to bottom of R foot. Denies pain and is  ambulatory       Chief Complaint   Patient presents with    Wound Check     Pt has an infected R pink toe and would like to get it checked out.      Review of patient's allergies indicates:   Allergen Reactions    Penicillins Other (See Comments)     PCN allergy as a child - was told he went into a coma. Tolerates Cefazolin without adverse reactions    Penicillin     Shellfish containing products      Other reaction(s): Unknown    Vancomycin Itching     Tolerated vancomycin 7/2020    Bactrim  [sulfamethoxazole-trimethoprim] Rash     Past Medical History:   Diagnosis Date    Allergy     CAD (coronary artery disease), native coronary artery 6/25/2013    Cancer     COVID-19 virus detected 9/1/2020    Diabetes mellitus     Diabetes mellitus, type 2     Disorder of kidney and ureter     Heart attack 04/2012    Hx of colon cancer, stage I     Hyperlipidemia     Hypertension     Muscular pain     post-op after colonoscopy    NSTEMI May 2013 - peak troponin 0.22 5/22/2013    MICHAELA (obstructive sleep apnea)     Retinopathy due to secondary diabetes        LOC: The patient is awake, alert and aware of environment with an appropriate affect, the patient is oriented x 3 and speaking appropriately.   APPEARANCE: Patient appears comfortable and in no acute distress, patient is clean and well groomed.  SKIN: The skin is warm and dry, color consistent with ethnicity.   MUSCULOSKELETAL: Patient moving all extremities spontaneously, no swelling noted. R pinky toe discolored with open wound. Old wound noted to bottom of R foot   RESPIRATORY: Airway is open and patent, respirations are spontaneous, patient has a normal effort and rate, no accessory muscle use noted.  CARDIAC: Patient has a normal rate and regular rhythm, no edema noted,  capillary refill < 3 seconds.   GASTRO: Soft and non tender to palpation, no distention noted.   : Pt denies any pain or frequency with urination.  NEURO: Pt opens eyes spontaneously, behavior appropriate to situation, follows commands.

## 2022-06-08 NOTE — ED NOTES
Pt sleeping in bed.  Resp even/non-labored, VVS.  Siderails up x 2/call light in reach.  Will continue to monitor.

## 2022-06-08 NOTE — HPI
55 yo M with Hx of DM, CAD, MI, cancer admitted 6/7 for right foot infection. Followed outpatient by [facility] for chronic right plantar foot wound, last seen 5/25. No major issues with that wound. He reports that a few days ago a blister formed on the right 5th toe, it then ruptured and has worsened since. Reports clear foul drainage, discoloration of the toe. Admits to some pain in the area of the 5th toe.

## 2022-06-09 ENCOUNTER — ANESTHESIA (OUTPATIENT)
Dept: SURGERY | Facility: HOSPITAL | Age: 57
DRG: 617 | End: 2022-06-09
Payer: COMMERCIAL

## 2022-06-09 LAB
ALBUMIN SERPL BCP-MCNC: 2.7 G/DL (ref 3.5–5.2)
ALP SERPL-CCNC: 93 U/L (ref 55–135)
ALT SERPL W/O P-5'-P-CCNC: 14 U/L (ref 10–44)
ANION GAP SERPL CALC-SCNC: 9 MMOL/L (ref 8–16)
AST SERPL-CCNC: 14 U/L (ref 10–40)
BASOPHILS # BLD AUTO: 0.06 K/UL (ref 0–0.2)
BASOPHILS NFR BLD: 0.6 % (ref 0–1.9)
BILIRUB SERPL-MCNC: 0.6 MG/DL (ref 0.1–1)
BUN SERPL-MCNC: 18 MG/DL (ref 6–20)
CALCIUM SERPL-MCNC: 9 MG/DL (ref 8.7–10.5)
CHLORIDE SERPL-SCNC: 100 MMOL/L (ref 95–110)
CO2 SERPL-SCNC: 24 MMOL/L (ref 23–29)
CREAT SERPL-MCNC: 1 MG/DL (ref 0.5–1.4)
DIFFERENTIAL METHOD: ABNORMAL
EOSINOPHIL # BLD AUTO: 0.2 K/UL (ref 0–0.5)
EOSINOPHIL NFR BLD: 2.3 % (ref 0–8)
ERYTHROCYTE [DISTWIDTH] IN BLOOD BY AUTOMATED COUNT: 12 % (ref 11.5–14.5)
EST. GFR  (AFRICAN AMERICAN): >60 ML/MIN/1.73 M^2
EST. GFR  (NON AFRICAN AMERICAN): >60 ML/MIN/1.73 M^2
GLUCOSE SERPL-MCNC: 287 MG/DL (ref 70–110)
GRAM STN SPEC: NORMAL
GRAM STN SPEC: NORMAL
HCT VFR BLD AUTO: 38.6 % (ref 40–54)
HGB BLD-MCNC: 13 G/DL (ref 14–18)
IMM GRANULOCYTES # BLD AUTO: 0.06 K/UL (ref 0–0.04)
IMM GRANULOCYTES NFR BLD AUTO: 0.6 % (ref 0–0.5)
LYMPHOCYTES # BLD AUTO: 1.2 K/UL (ref 1–4.8)
LYMPHOCYTES NFR BLD: 13.2 % (ref 18–48)
MAGNESIUM SERPL-MCNC: 1.8 MG/DL (ref 1.6–2.6)
MCH RBC QN AUTO: 29.1 PG (ref 27–31)
MCHC RBC AUTO-ENTMCNC: 33.7 G/DL (ref 32–36)
MCV RBC AUTO: 87 FL (ref 82–98)
MONOCYTES # BLD AUTO: 1 K/UL (ref 0.3–1)
MONOCYTES NFR BLD: 10.2 % (ref 4–15)
NEUTROPHILS # BLD AUTO: 6.8 K/UL (ref 1.8–7.7)
NEUTROPHILS NFR BLD: 73.1 % (ref 38–73)
NRBC BLD-RTO: 0 /100 WBC
PHOSPHATE SERPL-MCNC: 3 MG/DL (ref 2.7–4.5)
PLATELET # BLD AUTO: 259 K/UL (ref 150–450)
PMV BLD AUTO: 9.3 FL (ref 9.2–12.9)
POCT GLUCOSE: 246 MG/DL (ref 70–110)
POCT GLUCOSE: 260 MG/DL (ref 70–110)
POCT GLUCOSE: 297 MG/DL (ref 70–110)
POCT GLUCOSE: 300 MG/DL (ref 70–110)
POTASSIUM SERPL-SCNC: 4.8 MMOL/L (ref 3.5–5.1)
PROT SERPL-MCNC: 6.8 G/DL (ref 6–8.4)
RBC # BLD AUTO: 4.46 M/UL (ref 4.6–6.2)
SODIUM SERPL-SCNC: 133 MMOL/L (ref 136–145)
WBC # BLD AUTO: 9.3 K/UL (ref 3.9–12.7)

## 2022-06-09 PROCEDURE — 87075 CULTR BACTERIA EXCEPT BLOOD: CPT | Performed by: PODIATRIST

## 2022-06-09 PROCEDURE — 99232 PR SUBSEQUENT HOSPITAL CARE,LEVL II: ICD-10-PCS | Mod: ,,, | Performed by: NURSE PRACTITIONER

## 2022-06-09 PROCEDURE — 99232 SBSQ HOSP IP/OBS MODERATE 35: CPT | Mod: ,,, | Performed by: NURSE PRACTITIONER

## 2022-06-09 PROCEDURE — 82962 GLUCOSE BLOOD TEST: CPT | Performed by: PODIATRIST

## 2022-06-09 PROCEDURE — 25000003 PHARM REV CODE 250: Performed by: INTERNAL MEDICINE

## 2022-06-09 PROCEDURE — 25000003 PHARM REV CODE 250: Performed by: STUDENT IN AN ORGANIZED HEALTH CARE EDUCATION/TRAINING PROGRAM

## 2022-06-09 PROCEDURE — D9220A PRA ANESTHESIA: Mod: ANES,,, | Performed by: ANESTHESIOLOGY

## 2022-06-09 PROCEDURE — 28810 AMPUTATION TOE & METATARSAL: CPT | Mod: T9,,, | Performed by: PODIATRIST

## 2022-06-09 PROCEDURE — 80202 ASSAY OF VANCOMYCIN: CPT | Performed by: INTERNAL MEDICINE

## 2022-06-09 PROCEDURE — 25000003 PHARM REV CODE 250: Performed by: NURSE PRACTITIONER

## 2022-06-09 PROCEDURE — 25000003 PHARM REV CODE 250

## 2022-06-09 PROCEDURE — 25000003 PHARM REV CODE 250: Performed by: NURSE ANESTHETIST, CERTIFIED REGISTERED

## 2022-06-09 PROCEDURE — 63600175 PHARM REV CODE 636 W HCPCS: Performed by: INTERNAL MEDICINE

## 2022-06-09 PROCEDURE — 63600175 PHARM REV CODE 636 W HCPCS: Performed by: NURSE PRACTITIONER

## 2022-06-09 PROCEDURE — 36415 COLL VENOUS BLD VENIPUNCTURE: CPT | Performed by: STUDENT IN AN ORGANIZED HEALTH CARE EDUCATION/TRAINING PROGRAM

## 2022-06-09 PROCEDURE — 87116 MYCOBACTERIA CULTURE: CPT | Performed by: PODIATRIST

## 2022-06-09 PROCEDURE — 99233 SBSQ HOSP IP/OBS HIGH 50: CPT | Mod: ,,, | Performed by: INTERNAL MEDICINE

## 2022-06-09 PROCEDURE — 71000015 HC POSTOP RECOV 1ST HR: Performed by: PODIATRIST

## 2022-06-09 PROCEDURE — 14040 TIS TRNFR F/C/C/M/N/A/G/H/F: CPT | Mod: RT,,, | Performed by: PODIATRIST

## 2022-06-09 PROCEDURE — 87070 CULTURE OTHR SPECIMN AEROBIC: CPT | Performed by: PODIATRIST

## 2022-06-09 PROCEDURE — 63600175 PHARM REV CODE 636 W HCPCS: Performed by: NURSE ANESTHETIST, CERTIFIED REGISTERED

## 2022-06-09 PROCEDURE — 80053 COMPREHEN METABOLIC PANEL: CPT | Performed by: STUDENT IN AN ORGANIZED HEALTH CARE EDUCATION/TRAINING PROGRAM

## 2022-06-09 PROCEDURE — 87205 SMEAR GRAM STAIN: CPT | Performed by: PODIATRIST

## 2022-06-09 PROCEDURE — 88305 TISSUE EXAM BY PATHOLOGIST: CPT | Performed by: PATHOLOGY

## 2022-06-09 PROCEDURE — 88305 TISSUE EXAM BY PATHOLOGIST: ICD-10-PCS | Mod: 26,,, | Performed by: PATHOLOGY

## 2022-06-09 PROCEDURE — 63600175 PHARM REV CODE 636 W HCPCS

## 2022-06-09 PROCEDURE — 87102 FUNGUS ISOLATION CULTURE: CPT | Performed by: PODIATRIST

## 2022-06-09 PROCEDURE — C9399 UNCLASSIFIED DRUGS OR BIOLOG: HCPCS | Performed by: NURSE PRACTITIONER

## 2022-06-09 PROCEDURE — 87176 TISSUE HOMOGENIZATION CULTR: CPT | Performed by: PODIATRIST

## 2022-06-09 PROCEDURE — 88311 PR  DECALCIFY TISSUE: ICD-10-PCS | Mod: 26,,, | Performed by: PATHOLOGY

## 2022-06-09 PROCEDURE — 14040 PR ADJ TISS XFER HEAD,FAC,HAND <10 SQCM: ICD-10-PCS | Mod: RT,,, | Performed by: PODIATRIST

## 2022-06-09 PROCEDURE — D9220A PRA ANESTHESIA: ICD-10-PCS | Mod: ANES,,, | Performed by: ANESTHESIOLOGY

## 2022-06-09 PROCEDURE — 88311 DECALCIFY TISSUE: CPT | Mod: 26,,, | Performed by: PATHOLOGY

## 2022-06-09 PROCEDURE — 12000002 HC ACUTE/MED SURGE SEMI-PRIVATE ROOM

## 2022-06-09 PROCEDURE — 88311 DECALCIFY TISSUE: CPT | Mod: 59 | Performed by: PATHOLOGY

## 2022-06-09 PROCEDURE — 71000044 HC DOSC ROUTINE RECOVERY FIRST HOUR: Performed by: PODIATRIST

## 2022-06-09 PROCEDURE — 37000009 HC ANESTHESIA EA ADD 15 MINS: Performed by: PODIATRIST

## 2022-06-09 PROCEDURE — 25000003 PHARM REV CODE 250: Performed by: PODIATRIST

## 2022-06-09 PROCEDURE — 87206 SMEAR FLUORESCENT/ACID STAI: CPT | Performed by: PODIATRIST

## 2022-06-09 PROCEDURE — 28810 PR AMPUTATION METATARSAL+TOE,SINGLE: ICD-10-PCS | Mod: T9,,, | Performed by: PODIATRIST

## 2022-06-09 PROCEDURE — 85025 COMPLETE CBC W/AUTO DIFF WBC: CPT | Performed by: STUDENT IN AN ORGANIZED HEALTH CARE EDUCATION/TRAINING PROGRAM

## 2022-06-09 PROCEDURE — 37000008 HC ANESTHESIA 1ST 15 MINUTES: Performed by: PODIATRIST

## 2022-06-09 PROCEDURE — D9220A PRA ANESTHESIA: ICD-10-PCS | Mod: CRNA,,, | Performed by: NURSE ANESTHETIST, CERTIFIED REGISTERED

## 2022-06-09 PROCEDURE — 36000707: Performed by: PODIATRIST

## 2022-06-09 PROCEDURE — 87076 CULTURE ANAEROBE IDENT EACH: CPT | Performed by: PODIATRIST

## 2022-06-09 PROCEDURE — 36000706: Performed by: PODIATRIST

## 2022-06-09 PROCEDURE — 84100 ASSAY OF PHOSPHORUS: CPT | Performed by: STUDENT IN AN ORGANIZED HEALTH CARE EDUCATION/TRAINING PROGRAM

## 2022-06-09 PROCEDURE — 83735 ASSAY OF MAGNESIUM: CPT | Performed by: STUDENT IN AN ORGANIZED HEALTH CARE EDUCATION/TRAINING PROGRAM

## 2022-06-09 PROCEDURE — D9220A PRA ANESTHESIA: Mod: CRNA,,, | Performed by: NURSE ANESTHETIST, CERTIFIED REGISTERED

## 2022-06-09 PROCEDURE — 88305 TISSUE EXAM BY PATHOLOGIST: CPT | Mod: 26,,, | Performed by: PATHOLOGY

## 2022-06-09 PROCEDURE — 99233 PR SUBSEQUENT HOSPITAL CARE,LEVL III: ICD-10-PCS | Mod: ,,, | Performed by: INTERNAL MEDICINE

## 2022-06-09 RX ORDER — FENTANYL CITRATE 50 UG/ML
INJECTION, SOLUTION INTRAMUSCULAR; INTRAVENOUS
Status: DISCONTINUED | OUTPATIENT
Start: 2022-06-09 | End: 2022-06-09

## 2022-06-09 RX ORDER — BUPIVACAINE HYDROCHLORIDE 5 MG/ML
INJECTION, SOLUTION EPIDURAL; INTRACAUDAL
Status: DISPENSED
Start: 2022-06-09 | End: 2022-06-10

## 2022-06-09 RX ORDER — LIDOCAINE HYDROCHLORIDE 20 MG/ML
INJECTION INTRAVENOUS
Status: DISCONTINUED | OUTPATIENT
Start: 2022-06-09 | End: 2022-06-09

## 2022-06-09 RX ORDER — ONDANSETRON 2 MG/ML
4 INJECTION INTRAMUSCULAR; INTRAVENOUS DAILY PRN
Status: DISCONTINUED | OUTPATIENT
Start: 2022-06-09 | End: 2022-06-09 | Stop reason: HOSPADM

## 2022-06-09 RX ORDER — PROPOFOL 10 MG/ML
VIAL (ML) INTRAVENOUS
Status: DISCONTINUED | OUTPATIENT
Start: 2022-06-09 | End: 2022-06-09

## 2022-06-09 RX ORDER — FENTANYL CITRATE 50 UG/ML
25 INJECTION, SOLUTION INTRAMUSCULAR; INTRAVENOUS EVERY 5 MIN PRN
Status: DISCONTINUED | OUTPATIENT
Start: 2022-06-09 | End: 2022-06-09 | Stop reason: HOSPADM

## 2022-06-09 RX ORDER — INSULIN ASPART 100 [IU]/ML
12 INJECTION, SOLUTION INTRAVENOUS; SUBCUTANEOUS
Status: DISCONTINUED | OUTPATIENT
Start: 2022-06-09 | End: 2022-06-10

## 2022-06-09 RX ORDER — LIDOCAINE HYDROCHLORIDE 10 MG/ML
INJECTION INFILTRATION; PERINEURAL
Status: DISPENSED
Start: 2022-06-09 | End: 2022-06-10

## 2022-06-09 RX ORDER — PROPOFOL 10 MG/ML
VIAL (ML) INTRAVENOUS CONTINUOUS PRN
Status: DISCONTINUED | OUTPATIENT
Start: 2022-06-09 | End: 2022-06-09

## 2022-06-09 RX ORDER — BUPIVACAINE HYDROCHLORIDE 5 MG/ML
INJECTION, SOLUTION EPIDURAL; INTRACAUDAL
Status: DISCONTINUED | OUTPATIENT
Start: 2022-06-09 | End: 2022-06-09 | Stop reason: HOSPADM

## 2022-06-09 RX ORDER — LIDOCAINE HYDROCHLORIDE 10 MG/ML
INJECTION INFILTRATION; PERINEURAL
Status: DISCONTINUED | OUTPATIENT
Start: 2022-06-09 | End: 2022-06-09 | Stop reason: HOSPADM

## 2022-06-09 RX ORDER — MIDAZOLAM HYDROCHLORIDE 1 MG/ML
INJECTION, SOLUTION INTRAMUSCULAR; INTRAVENOUS
Status: DISCONTINUED | OUTPATIENT
Start: 2022-06-09 | End: 2022-06-09

## 2022-06-09 RX ADMIN — MIDAZOLAM HYDROCHLORIDE 2 MG: 1 INJECTION, SOLUTION INTRAMUSCULAR; INTRAVENOUS at 02:06

## 2022-06-09 RX ADMIN — VANCOMYCIN HYDROCHLORIDE 1750 MG: 500 INJECTION, POWDER, LYOPHILIZED, FOR SOLUTION INTRAVENOUS at 12:06

## 2022-06-09 RX ADMIN — TACROLIMUS 900 MG: 0.5 CAPSULE ORAL at 02:06

## 2022-06-09 RX ADMIN — ATORVASTATIN CALCIUM 80 MG: 20 TABLET, FILM COATED ORAL at 09:06

## 2022-06-09 RX ADMIN — INSULIN ASPART 12 UNITS: 100 INJECTION, SOLUTION INTRAVENOUS; SUBCUTANEOUS at 06:06

## 2022-06-09 RX ADMIN — SODIUM CHLORIDE: 9 INJECTION, SOLUTION INTRAVENOUS at 02:06

## 2022-06-09 RX ADMIN — Medication 50 MCG/KG/MIN: at 02:06

## 2022-06-09 RX ADMIN — CEFEPIME HYDROCHLORIDE 2 G: 2 INJECTION, SOLUTION INTRAVENOUS at 07:06

## 2022-06-09 RX ADMIN — FENTANYL CITRATE 25 MCG: 50 INJECTION, SOLUTION INTRAMUSCULAR; INTRAVENOUS at 02:06

## 2022-06-09 RX ADMIN — CEFEPIME HYDROCHLORIDE 2 G: 2 INJECTION, SOLUTION INTRAVENOUS at 02:06

## 2022-06-09 RX ADMIN — LISINOPRIL 40 MG: 20 TABLET ORAL at 09:06

## 2022-06-09 RX ADMIN — TACROLIMUS 900 MG: 0.5 CAPSULE ORAL at 05:06

## 2022-06-09 RX ADMIN — CARVEDILOL 12.5 MG: 12.5 TABLET, FILM COATED ORAL at 09:06

## 2022-06-09 RX ADMIN — FENTANYL CITRATE 25 MCG: 50 INJECTION, SOLUTION INTRAMUSCULAR; INTRAVENOUS at 03:06

## 2022-06-09 RX ADMIN — INSULIN DETEMIR 15 UNITS: 100 INJECTION, SOLUTION SUBCUTANEOUS at 09:06

## 2022-06-09 RX ADMIN — INSULIN ASPART 4 UNITS: 100 INJECTION, SOLUTION INTRAVENOUS; SUBCUTANEOUS at 09:06

## 2022-06-09 RX ADMIN — ASPIRIN 81 MG: 81 TABLET, COATED ORAL at 09:06

## 2022-06-09 RX ADMIN — PROPOFOL 50 MG: 10 INJECTION, EMULSION INTRAVENOUS at 02:06

## 2022-06-09 RX ADMIN — INSULIN ASPART 3 UNITS: 100 INJECTION, SOLUTION INTRAVENOUS; SUBCUTANEOUS at 09:06

## 2022-06-09 RX ADMIN — LIDOCAINE HYDROCHLORIDE 50 MG: 20 INJECTION, SOLUTION INTRAVENOUS at 02:06

## 2022-06-09 RX ADMIN — CEFEPIME HYDROCHLORIDE 2 G: 2 INJECTION, SOLUTION INTRAVENOUS at 09:06

## 2022-06-09 RX ADMIN — INSULIN ASPART 6 UNITS: 100 INJECTION, SOLUTION INTRAVENOUS; SUBCUTANEOUS at 06:06

## 2022-06-09 NOTE — ANESTHESIA PREPROCEDURE EVALUATION
06/09/2022  Billy Rivera is a 57 y.o., male.  Pre-operative evaluation for Procedure(s) (LRB):  AMPUTATION, TOE - Dr. Melara @ [facility] in am (Right)      Prev airway: Placement Date: 04/05/17; Placement Time: 1404; Method of Intubation: Roa; Inserted by: Anesthesia Resident; Airway Device: Endotracheal Tube; Mask Ventilation: Easy - oral; Intubated: Postinduction; Blade:  (Mc grath 4 ); Airway Device Size: 8.0; Style: Cuffed; Cuff Inflation: Minimal occlusive pressure; Placement Verified By: Auscultation, Capnometry; Grade: Grade I; Complicating Factors: Large/Floppy epiglottis, Oropharyngeal edema or fat, Obesity; Intubation Findings: Positive EtCO2, Bilateral breath sounds, Atraumatic/Condition of teeth unchanged; Complications: None; Removal Date: 04/05/17;  Removal Time: 1709      Patient Active Problem List   Diagnosis    Coronary artery disease involving native coronary artery of native heart without angina pectoris    BDR (background diabetic retinopathy) - Both Eyes    Uncontrolled type II diabetes mellitus    Peripheral arterial disease    Sleep apnea    Proteinuria    Edema    Hypertension associated with diabetes    Type 2 diabetes mellitus with diabetic peripheral angiopathy without gangrene, with long-term current use of insulin    Onychomycosis of multiple toenails with type 2 diabetes mellitus    Type 2 diabetes mellitus 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, with long-term current use of insulin    Hyponatremia    Anemia    Hypomagnesemia    Diabetic foot ulcer associated with type 2 diabetes mellitus    Insomnia    Hx of colon cancer, stage I    Dyslipidemia associated with type 2 diabetes mellitus    Diabetes mellitus with insulin therapy    Obesity, diabetes, and hypertension syndrome    Class 2 severe obesity  with body mass index (BMI) of 35 to 39.9 with serious comorbidity    Diabetes mellitus with microalbuminuria    Diabetes mellitus with peripheral circulatory disorder    Diabetic foot infection    Coronary artery disease involving coronary bypass graft of native heart without angina pectoris    Osteomyelitis of left foot    Amputation of toe of left foot    Insulin pump in place    Osteomyelitis of right foot    CAD (coronary artery disease)    Ulcer of right foot with fat layer exposed       Review of patient's allergies indicates:   Allergen Reactions    Penicillins Other (See Comments)     PCN allergy as a child - was told he went into a coma. Tolerates Cefazolin without adverse reactions    Penicillin     Shellfish containing products      Other reaction(s): Unknown    Vancomycin Itching     Tolerated vancomycin 7/2020    Bactrim  [sulfamethoxazole-trimethoprim] Rash        No current facility-administered medications on file prior to encounter.     Current Outpatient Medications on File Prior to Encounter   Medication Sig Dispense Refill    acetaminophen (TYLENOL) 325 MG tablet Take 325 mg by mouth every 6 (six) hours as needed for Pain.      ascorbic acid, vitamin C, (VITAMIN C) 250 MG tablet Take 500 mg by mouth once daily.      aspirin (ECOTRIN) 81 MG EC tablet Take 1 tablet (81 mg total) by mouth once daily.  0    aspirin/salicylamide/caffeine (BC HEADACHE POWDER ORAL) Take 1 Package by mouth daily as needed (pain).      atorvastatin (LIPITOR) 80 MG tablet Take 1 tablet (80 mg total) by mouth once daily. Take every night. 90 tablet 3    bismuth subsalicylate (PEPTO-BISMOL) 262 mg/15 mL suspension Take 15 mLs by mouth every other day. (As needed for diarrhea)      carvediloL (COREG) 12.5 MG tablet Take 1 tablet (12.5 mg total) by mouth 2 (two) times daily. (Patient taking differently: Take 12.5 mg by mouth once daily.) 180 tablet 3    ibuprofen (ADVIL,MOTRIN) 200 MG tablet Take 400-600  "mg by mouth daily as needed for Pain.      insulin lispro (HUMALOG U-100 INSULIN) 100 unit/mL injection Inject 100 Units into the skin continuous. Gives via insulin pump only. Do not Directly inject. 50 mL 11    lisinopriL (PRINIVIL,ZESTRIL) 40 MG tablet Take 1 tablet by mouth once daily 30 tablet 0    metFORMIN (GLUCOPHAGE) 1000 MG tablet Take 1 tablet (1,000 mg total) by mouth 2 (two) times daily with meals. 180 tablet 3    MINIMED 770G INSULIN PUMP Misc 1 each by Misc.(Non-Drug; Combo Route) route continuous. Medically necessary for management of Type 2 diabetes, E11.65 1 each 0    multivit-min/folic/vit K/lycop (MEN'S MULTIVITAMIN ORAL) Take 1 tablet by mouth once daily.      blood sugar diagnostic (ACCU-CHEK GUIDE TEST STRIPS) Strp 1 each by Misc.(Non-Drug; Combo Route) route 5 (five) times daily. 150 each 11    flash glucose sensor (FREESTYLE MICHAEL 2 SENSOR) Kit 1 each by Misc.(Non-Drug; Combo Route) route every 14 (fourteen) days. 2 kit 11    insulin (LANTUS SOLOSTAR U-100 INSULIN) glargine 100 units/mL (3mL) SubQ pen Inject 60 Units into the skin once daily. (Patient not taking: Reported on 6/8/2022) 15 mL 0    lancets (ACCU-CHEK FASTCLIX LANCET DRUM) Misc 1 each by Misc.(Non-Drug; Combo Route) route Daily. 30 each 11    pen needle, diabetic 31 gauge x 3/16" Ndle Inject 1 each into the skin 3 (three) times daily before meals. (Patient taking differently: Inject 1 each into the skin 4 (four) times daily.) 100 each 12       Past Surgical History:   Procedure Laterality Date    COLONOSCOPY N/A 2/17/2017    Procedure: COLONOSCOPY;  Surgeon: Simon Gomez MD;  Location: [facility] (4TH FLR);  Service: Endoscopy;  Laterality: N/A;    CORONARY ANGIOPLASTY      INCISION AND DRAINAGE FOOT Right 6/5/2018    Procedure: INCISION AND DRAINAGE, FOOT;  Surgeon: Bridget Santana DPM;  Location: [facility] OR 1ST FLR;  Service: Podiatry;  Laterality: Right;  Request mini C arm in room. request wound vac with medium " black sponge    INCISION AND DRAINAGE FOOT Right 6/7/2018    Procedure: INCISION AND DRAINAGE, FOOT;  Surgeon: Emelia Brock DPM;  Location: [facility] OR 2ND FLR;  Service: Podiatry;  Laterality: Right;    INCISION AND DRAINAGE FOOT Left 9/4/2020    Procedure: INCISION AND DRAINAGE, FOOT;  Surgeon: Ainsley Powell DPM;  Location: [facility] OR 2ND FLR;  Service: Podiatry;  Laterality: Left;    INCISION AND DRAINAGE FOOT Left 12/22/2020    Procedure: INCISION AND DRAINAGE, FOOT;  Surgeon: Ainsley Powell DPM;  Location: [facility] OR 2ND FLR;  Service: Podiatry;  Laterality: Left;  May need micro choice  Need Jam shidi needles  Stretcher OK      INCISION AND DRAINAGE OF ABSCESS Right 6/2/2018    Procedure: INCISION AND DRAINAGE, ABSCESS;  Surgeon: Bridget Santana DPM;  [facility];  Service: General;  Laterality: Right;    OSTEOTOMY OF METATARSAL BONE  9/4/2020    Procedure: OSTEOTOMY, METATARSAL BONE, 1st METATARSAL RESECTION;  Surgeon: Ainsley Powell DPM;  Location: [facility] OR [facility] FLR;  Service: Podiatry;;    REMOVAL OF FOREIGN BODY FROM FOOT Right 6/7/2018    Procedure: REMOVAL, FOREIGN BODY, FOOT;  Surgeon: Emelia Brock DPM;  Location: [facility] OR [facility] FLR;  Service: Podiatry;  Laterality: Right;    TOE AMPUTATION Left 7/4/2020    Procedure: AMPUTATION, TOE;  Surgeon: Bridget Santana DPM;  [facility];  Service: Podiatry;  Laterality: Left;    TOE AMPUTATION Left 10/14/2020    Procedure: AMPUTATION, TOE;  Surgeon: Mingo Melara DPM;  Location: [facility] OR [facility] FLR;  Service: Podiatry;  Laterality: Left;  stretcher OK    TONSILLECTOMY         Social History     Socioeconomic History    Marital status:    Tobacco Use    Smoking status: Never Smoker    Smokeless tobacco: Never Used   Substance and Sexual Activity    Alcohol use: Yes     Comment: rare x1 beer-     Drug use: No    Sexual activity: Not Currently         Vital Signs Range (Last 24H):  Temp:  [36.6 °C (97.8 °F)-37.1 °C  (98.7 °F)]   Pulse:  [56-69]   Resp:  [18-20]   BP: (115-158)/(51-92)   SpO2:  [94 %-97 %]       CBC:   Recent Labs     06/08/22  0036 06/09/22 0447   WBC 9.73 9.30   RBC 4.48* 4.46*   HGB 13.6* 13.0*   HCT 38.9* 38.6*    259   MCV 87 87   MCH 30.4 29.1   MCHC 35.0 33.7       CMP:   Recent Labs     06/08/22  0036 06/09/22 0447   * 133*   K 4.7 4.8   CL 97 100   CO2 20* 24   BUN 26* 18   CREATININE 1.3 1.0   * 287*   MG  --  1.8   PHOS  --  3.0   CALCIUM 8.9 9.0   ALBUMIN 2.9* 2.7*   PROT 7.2 6.8   ALKPHOS 91 93   ALT 19 14   AST 17 14   BILITOT 0.5 0.6       INR  No results for input(s): PT, INR, PROTIME, APTT in the last 72 hours.        Diagnostic Studies:      EKG:  Normal sinus rhythm   Left axis deviation   Abnormal ECG   When compared with ECG of 13-MAR-2017 15:14,   Vent. rate has increased BY  27 BPM   Confirmed by Billy Yee MD ([id number]) on 6/8/2022 3:50:55 PM     2D Echo:    CONCLUSIONS     1 - Normal left ventricular function (EF 60%).     2 - Concentric remodeling.     3 - Normal diastolic function.     4 - Normal right ventricular function .       Pre-op Assessment    I have reviewed the Patient Summary Reports.     I have reviewed the Nursing Notes. I have reviewed the NPO Status.   I have reviewed the Medications.     Review of Systems  Anesthesia Hx:  No problems with previous Anesthesia  History of prior surgery of interest to airway management or planning: Denies Family Hx of Anesthesia complications.   Denies Personal Hx of Anesthesia complications.   Social:  No Alcohol Use, Non-Smoker    Hematology/Oncology:  Hematology Normal   Oncology Normal     EENT/Dental:EENT/Dental Normal   Cardiovascular:   Hypertension Past MI CAD  CABG/stent     Pulmonary:   Sleep Apnea    Renal/:   Chronic Renal Disease    Hepatic/GI:  Hepatic/GI Normal    Musculoskeletal:   Right toe amputation   Neurological:  Neurology Normal    Endocrine:   Diabetes, poorly controlled, type 2  Morbid  Obesity / BMI > 40  Dermatological:  Skin Normal    Psych:  Psychiatric Normal           Physical Exam  General: Well nourished, Cooperative, Alert and Oriented    Airway:  Mallampati: III / I  Mouth Opening: Normal  TM Distance: Normal  Tongue: Normal  Neck ROM: Normal ROM    Dental:  Intact, Periodontal disease    Chest/Lungs:  Clear to auscultation, Normal Respiratory Rate    Heart:  Rate: Normal  Rhythm: Regular Rhythm  Sounds: Normal        Anesthesia Plan  Type of Anesthesia, risks & benefits discussed:    Anesthesia Type: Gen ETT, Gen Supraglottic Airway, Gen Natural Airway  Intra-op Monitoring Plan: Standard ASA Monitors  Post Op Pain Control Plan:   (medical reason for not using multimodal pain management)  Induction:  IV  Informed Consent: Informed consent signed with the Patient and all parties understand the risks and agree with anesthesia plan.  All questions answered.   ASA Score: 3  Day of Surgery Review of History & Physical: H&P Update referred to the surgeon/provider.    Ready For Surgery From Anesthesia Perspective.     .

## 2022-06-09 NOTE — OP NOTE
[facility]ul[facility]ello - Surgery ([facility])  Operative Note      Date of Procedure: 6/9/2022     Procedure: Right 5th toe + metatarsal amputation, right foot wound debridement to subcutaneous tissue <20 cm^2    Surgeon(s) and Role:     * Mingo Melara DPM - Primary     * Josiah Saunders DPM - Resident - Assisting        Pre-Operative Diagnosis: Foot infection [L08.9]  Toe osteomyelitis, right [M86.9]    Post-Operative Diagnosis: Post-Op Diagnosis Codes:     * Foot infection [L08.9]     * Toe osteomyelitis, right [M86.9]    Anesthesia: Local MAC     Description of Technical Procedures: Due to the extent of nonviable tissues this is part of a staged procedure and further debridement in the OR, at bedside, or in clinic will be required during the course of management of this wound/infection.     The patient was brought to the operating room on a stretcher and remained on the stretcher in supine position for the entirety of the procedure. Anesthesia was induced.  A tourniquet was applied to the right ankle. 20 mL of a 1:1 mixture of 0.50% bupivacaine plain and 1% lidocaine plain was locally infiltrated. The right lower limb was prepped and draped in a sterile manner. Tourniquet(s) inflated to 250 mmHg.     Attention was directed to the plantar forefoot wound. Using a 15 blade and curette the wound was debrided to the level of subcutaneous tissue; biofilm, epidermis, dermis, and subcutaneous tissue was excised. Only viable soft tissues remained.     Attention was directed to the 5th toe. The toe was grasped with a towel clamp. Using a 15 blade a full thickness racquet shaped incision was made at the level of the MTPJ. Soft tissue envelope was sharply elevated from the bone. Using a 15 blade a circumferential capsulotomy was performed including transection of all associated tendons and ligaments, the toe was removed. The metatarsal head appeared relatively healthy but the decision was made to excise the metatarsal head for wider  margin and to improve soft tissue closure. Soft tissue envelope was sharply elevated from the distal 5th metatarsal. Another 10 mL of 1% lidocaine plain was locally infiltrated to improve patient comfort. Using a sagittal saw the 5th metatarsal was transected at the neck, amputated portion removed. Using a rongeur and 15 blade excess fibrous soft tissues were removed from the resection bed to debulk the soft tissues. Using tenotomy scissors the periwound was undermined to mobilize the soft tissues. Site irrigated with saline via bulb syringe. Using a rongeur clean margin specimens were collected from the 5th metatarsal residuum, sent for culture and pathology.  Appropriate skin and dance minute/rearrangement and transfer was performed/less than 10 cm to allow appropriate closure preparation.  Layered primary closure performed using 3-0 vicryl and 3-0 nylon suture. Dressed with xeroform, 4x4 gauze, abd pad, cast padding, ACE wrap. Tourniquet(s) deflated.       The patient was transferred to the recovery room with vital signs stable. Following a period of post op monitoring, the patient will be transferred to the floor with the following postop instructions:   1. Keep dressing clean, dry, and intact until next seen by podiatry.   2. Weightbearing status: nonweightbearing.   3. All necessary prescriptions were ordered and medical management will continue.   4. Contact podiatry with any postop questions or concerns.     Estimated Blood Loss (EBL): 5 mL           Implants: * No implants in log *    Specimens:   Specimen (24h ago, onward)                 Start     Ordered    06/09/22 1515  Specimen to Pathology, Surgery General Surgery  Once        Comments: Pre-op Diagnosis: Foot infection [L08.9]Toe osteomyelitis, right [M86.9]Procedure(s):AMPUTATION, TOE - Dr. Melara @ [place] in am Number of specimens: 2Name of specimens: 1. Right 5th toe - permanent 2. Right 5th metatarsal clean margin - permanent     References:     Click here for ordering Quick Tip   Question Answer Comment   Procedure Type: General Surgery    Which provider would you like to cc? ELMER BLAKELY    Release to patient Immediate        06/09/22 2017                            Condition: Good    Disposition: PACU - hemodynamically stable.    Attestation: I was present and scrubbed for the entire procedure.

## 2022-06-09 NOTE — ASSESSMENT & PLAN NOTE
Last A1c 12.2 from 1/2022. Taking U-100 insulin via insulin pump and metformin 1,000 mg BID prior to admission.  - A1C of 10.0  - Endo consulted in setting of hyperglycemia and uncontrolled T2DM while on insulin pump    Plan  - continue to appreciate Endo recs  - detemir 15 BID, aspart 10 TIDWM with MDSSI  - accuchecks QID  - goal -180  - diabetic diet once no longer NPO

## 2022-06-09 NOTE — SUBJECTIVE & OBJECTIVE
"Interval HPI:   Overnight events:   BG above goal this AM, but trending downwards from yesterday's readings. Patient scheduled for right 5th toe amputation today. Endocrinology will continue to follow, and manage glycemic control while inpatient.   Diet NPO  Day of Surgery    Eating:   NPO  Nausea: No  Hypoglycemia and intervention: No  Fever: No  TPN and/or TF: No  If yes, type of TF/TPN and rate: None    /72 (BP Location: Left arm, Patient Position: Lying)   Pulse (!) 56   Temp 97.8 °F (36.6 °C) (Oral)   Resp 18   Ht 6' 2" (1.88 m)   Wt 128.1 kg (282 lb 8.3 oz)   SpO2 (!) 94%   BMI 36.27 kg/m²     Labs Reviewed and Include    Recent Labs   Lab 06/09/22  0447   *   CALCIUM 9.0   ALBUMIN 2.7*   PROT 6.8   *   K 4.8   CO2 24      BUN 18   CREATININE 1.0   ALKPHOS 93   ALT 14   AST 14   BILITOT 0.6     Lab Results   Component Value Date    WBC 9.30 06/09/2022    HGB 13.0 (L) 06/09/2022    HCT 38.6 (L) 06/09/2022    MCV 87 06/09/2022     06/09/2022     No results for input(s): TSH, FREET4 in the last 168 hours.  Lab Results   Component Value Date    HGBA1C 10.0 (H) 06/08/2022       Nutritional status:   Body mass index is 36.27 kg/m².  Lab Results   Component Value Date    ALBUMIN 2.7 (L) 06/09/2022    ALBUMIN 2.9 (L) 06/08/2022    ALBUMIN 3.6 01/26/2022     Lab Results   Component Value Date    PREALBUMIN 22 07/16/2018    PREALBUMIN 21 07/09/2018    PREALBUMIN 20 07/02/2018       Estimated Creatinine Clearance: 114.6 mL/min (based on SCr of 1 mg/dL).    Accu-Checks  Recent Labs     06/08/22  0547 06/08/22  0808 06/08/22  1435 06/08/22  1632 06/08/22  2202   POCTGLUCOSE 290* 257* 356* 328* 270*       Current Medications and/or Treatments Impacting Glycemic Control  Immunotherapy:    Immunosuppressants       None          Steroids:   Hormones (From admission, onward)                Start     Stop Route Frequency Ordered    06/08/22 0574  melatonin tablet 6 mg         -- Oral " Nightly PRN 06/08/22 0428          Pressors:    Autonomic Drugs (From admission, onward)                None          Hyperglycemia/Diabetes Medications:   Antihyperglycemics (From admission, onward)                Start     Stop Route Frequency Ordered    06/08/22 2100  insulin detemir U-100 pen 15 Units         -- SubQ 2 times daily 06/08/22 1400    06/08/22 1645  insulin aspart U-100 pen 10 Units         -- SubQ 3 times daily with meals 06/08/22 1400    06/08/22 1459  insulin aspart U-100 pen 1-10 Units         -- SubQ Before meals & nightly PRN 06/08/22 1400

## 2022-06-09 NOTE — PROGRESS NOTES
Nutrition-Related Diabetes Education      Time Spent:10 min    Learners: Pt    Current HbA1c: 10    Is patient aware of their A1c and their goal A1c? Yes    Home diabetes medication(s):   Metformin 1000 mg BID and Trulicity 1.5 mg weekly.   Medtronic 770G  Pump Settings:  Basal 1.9 units/hr  ICR: 1:56 (patient uses pre-set bolus parameters 5 units for snack; 10 units for small meals; 15 units for large meals)  ISF 1:25  Target B-120 mg/dL  IOB: 4 hours    Nutrition Education with handouts: Educated pt on CHO counting for people with diabetes. Pt stated were familiar with carbohydrate counting. Not following diabetic diet at home. Pt verbalized understanding. Emphasized the importance of diet adherence. Pt with no additional questions. No other needs identified. Left all education material with pt at bedside.    Comments: Wt stable. Denies any significant wt changes. No indicators of malnutrition.    Barriers to Learning: No    Follow up: Yes    Please consult as needed.  Thank you!    Michael LUND

## 2022-06-09 NOTE — HOSPITAL COURSE
Patient admitted to [facility] for OM. MRI of foot showed concern for gas (greater concern for nec fasc) so patient started on broad spectrum abx with vanc/cefepime/clinda. Podiatry consulted and agree with OM and patient to go to OR for toe amputation on 6/9. Endocrine following for hyperglycemia in setting of uncontrolled T2DM on an insulin pump. Per podiatry, the patient tolerated the amputation quite well and was ambulating soon after surgery. Per their recommendation, all IV antibiotics were stopped, and the patient was started on doxycyline for 5 days. Patient educated on post-operative management and discharged home with a walker 6/10.

## 2022-06-09 NOTE — ASSESSMENT & PLAN NOTE
Endocrinology consulted for BG management.   BG goal 140-180    - Increase Levemir to 20 units BID.   - Increase Novolog to 12 units TIDWM. HOLD while NPO.   - Continue Moderate Dose SQ Insulin Correction Scale.  - BG Monitoring AC/HS     ** Please notify Endocrine for any change and/or advance in diet**  ** Please call Endocrine for any BG related issues **    Discharge Planning:   TBD. Please notify endocrinology prior to discharge. Will most likely have patient continue home insulin pump, and follow-up with primary endo provider for DM follow-up and maintenance.

## 2022-06-09 NOTE — PROGRESS NOTES
"[facility] ([facility])  Endocrinology  Progress Note    Admit Date: 2022     Reason for Consult: Management of T2DM, Hyperglycemia      Diabetes diagnosis year:   "Around 39 years old"     Home Diabetes Medications:    Metformin 1000 mg BID and Trulicity 1.5 mg weekly.   Medtronic 770G  Pump Settings:  Basal 1.9 units/hr  ICR: 1:56 (patient uses pre-set bolus parameters 5 units for snack; 10 units for small meals; 15 units for large meals)  ISF 1:25  Target B-120 mg/dL  IOB: 4 hours     How often checking glucose at home? 1-3 x day   BG readings on regimen: 200's- 300's  Hypoglycemia on the regimen?  No  Missed doses on regimen?  No     Diabetes Complications include:     Hyperglycemia, Diabetic retinopathy , Foot ulcer. Foot wound,   and Other skin ulcer     Complicating diabetes co morbidities:   History of MI and MICHAELA, CAD, HTN, HLD,         HPI: Billy Rivera is a 56 year old man with T2DM (last A1c 12), HTN, CAD, MICHAELA not on CPAP who presented for right fifth toe wound. He first noticed the wound 3 days ago while changing the bandage, then on day of presentation noticed that a blister had ruptured and that it looked worse. Saw clear drainage. No fevers, chills, nausea, or foot pain. Works as a . Lives with his wife. Ambulates without assistive device, independent for ADLs. In ED VSS, labs notable for ESR 78 and CRP 70. No leukocytosis. Blood cultures drawn, toe culture sent. Received dose of cefepime and 1L NS. XR showed cortical breakdown. MRI ordered. Endocrine consulted to manage hyperglycemia, type 2 diabetes, and insulin pump therapy.     Lab Results   Component Value Date    HGBA1C 10.0 (H) 2022           Interval HPI:   Overnight events:   BG above goal this AM, but trending downwards from yesterday's readings. Patient scheduled for right 5th toe amputation today. Endocrinology will continue to follow, and manage glycemic control while inpatient.   Diet NPO  Day " "of Surgery    Eating:   NPO  Nausea: No  Hypoglycemia and intervention: No  Fever: No  TPN and/or TF: No  If yes, type of TF/TPN and rate: None    /72 (BP Location: Left arm, Patient Position: Lying)   Pulse (!) 56   Temp 97.8 °F (36.6 °C) (Oral)   Resp 18   Ht 6' 2" (1.88 m)   Wt 128.1 kg (282 lb 8.3 oz)   SpO2 (!) 94%   BMI 36.27 kg/m²     Labs Reviewed and Include    Recent Labs   Lab 06/09/22  0447   *   CALCIUM 9.0   ALBUMIN 2.7*   PROT 6.8   *   K 4.8   CO2 24      BUN 18   CREATININE 1.0   ALKPHOS 93   ALT 14   AST 14   BILITOT 0.6     Lab Results   Component Value Date    WBC 9.30 06/09/2022    HGB 13.0 (L) 06/09/2022    HCT 38.6 (L) 06/09/2022    MCV 87 06/09/2022     06/09/2022     No results for input(s): TSH, FREET4 in the last 168 hours.  Lab Results   Component Value Date    HGBA1C 10.0 (H) 06/08/2022       Nutritional status:   Body mass index is 36.27 kg/m².  Lab Results   Component Value Date    ALBUMIN 2.7 (L) 06/09/2022    ALBUMIN 2.9 (L) 06/08/2022    ALBUMIN 3.6 01/26/2022     Lab Results   Component Value Date    PREALBUMIN 22 07/16/2018    PREALBUMIN 21 07/09/2018    PREALBUMIN 20 07/02/2018       Estimated Creatinine Clearance: 114.6 mL/min (based on SCr of 1 mg/dL).    Accu-Checks  Recent Labs     06/08/22  0547 06/08/22  0808 06/08/22  1435 06/08/22  1632 06/08/22  2202   POCTGLUCOSE 290* 257* 356* 328* 270*       Current Medications and/or Treatments Impacting Glycemic Control  Immunotherapy:    Immunosuppressants       None          Steroids:   Hormones (From admission, onward)                Start     Stop Route Frequency Ordered    06/08/22 0526  melatonin tablet 6 mg         -- Oral Nightly PRN 06/08/22 0428          Pressors:    Autonomic Drugs (From admission, onward)                None          Hyperglycemia/Diabetes Medications:   Antihyperglycemics (From admission, onward)                Start     Stop Route Frequency Ordered    06/08/22 " 2100  insulin detemir U-100 pen 15 Units         -- SubQ 2 times daily 06/08/22 1400    06/08/22 1645  insulin aspart U-100 pen 10 Units         -- SubQ 3 times daily with meals 06/08/22 1400    06/08/22 1459  insulin aspart U-100 pen 1-10 Units         -- SubQ Before meals & nightly PRN 06/08/22 1400            ASSESSMENT and PLAN    * Osteomyelitis of right foot  Managed per primary team  Avoid hypoglycemia        Type 2 diabetes mellitus with diabetic peripheral angiopathy without gangrene, with long-term current use of insulin  Endocrinology consulted for BG management.   BG goal 140-180    - Increase Levemir to 20 units BID.   - Increase Novolog to 12 units TIDWM. HOLD while NPO.   - Continue Moderate Dose SQ Insulin Correction Scale.  - BG Monitoring AC/HS     ** Please notify Endocrine for any change and/or advance in diet**  ** Please call Endocrine for any BG related issues **    Discharge Planning:   TBD. Please notify endocrinology prior to discharge. Will most likely have patient continue home insulin pump, and follow-up with primary endo provider for DM follow-up and maintenance.            Sleep apnea  May affect BG readings if uncontrolled        CAD (coronary artery disease)  Managed per primary team  Condition may cause insulin resistance         Ulcer of right foot with fat layer exposed  Optimize BG control to improve wound healing              Amrit Au, NP  Endocrinology  [facility] (1st Fl)

## 2022-06-09 NOTE — SUBJECTIVE & OBJECTIVE
Interval History: Symptoms:  Same.  Diet:  Adequate intake.    Activity level:  Impaired due to wound  Pain:  No pain.       Review of Systems   Constitutional:  Positive for activity change and fatigue. Negative for appetite change.   Respiratory:  Negative for cough and shortness of breath.    Cardiovascular:  Negative for chest pain and leg swelling.   Gastrointestinal:  Negative for abdominal distention and abdominal pain.   Musculoskeletal:  Positive for arthralgias and joint swelling. Negative for back pain.   Skin:  Positive for wound.   Neurological:  Negative for dizziness and weakness.   Psychiatric/Behavioral:  Negative for agitation and confusion.    Objective:     Vital Signs (Most Recent):  Temp: 97.8 °F (36.6 °C) (06/09/22 1125)  Pulse: (!) 56 (06/09/22 1125)  Resp: 18 (06/09/22 1125)  BP: 115/72 (06/09/22 1125)  SpO2: (!) 94 % (06/09/22 1125)   Vital Signs (24h Range):  Temp:  [97.8 °F (36.6 °C)-98.7 °F (37.1 °C)] 97.8 °F (36.6 °C)  Pulse:  [56-69] 56  Resp:  [18-20] 18  SpO2:  [94 %-97 %] 94 %  BP: (115-158)/(51-92) 115/72     Weight: 128.1 kg (282 lb 8.3 oz)  Body mass index is 36.27 kg/m².  No intake or output data in the 24 hours ending 06/09/22 1318   Physical Exam  Vitals and nursing note reviewed.   Constitutional:       Appearance: He is obese. He is ill-appearing.   HENT:      Mouth/Throat:      Mouth: Mucous membranes are moist.      Pharynx: Oropharynx is clear.   Eyes:      Extraocular Movements: Extraocular movements intact.      Conjunctiva/sclera: Conjunctivae normal.      Pupils: Pupils are equal, round, and reactive to light.   Cardiovascular:      Rate and Rhythm: Normal rate and regular rhythm.      Heart sounds: No murmur heard.  Pulmonary:      Effort: Pulmonary effort is normal. No respiratory distress.      Breath sounds: Normal breath sounds. No rales.   Abdominal:      General: Abdomen is flat. There is no distension.      Palpations: Abdomen is soft.      Tenderness: There  is no abdominal tenderness.   Musculoskeletal:         General: Normal range of motion.      Cervical back: Normal range of motion and neck supple.      Right lower leg: Edema present.      Left lower leg: Edema present.      Comments: Warmth on RLE, rash indicative of possible cellulitis   Neurological:      General: No focal deficit present.      Mental Status: He is alert and oriented to person, place, and time.   Psychiatric:         Mood and Affect: Mood normal.         Behavior: Behavior normal.         Thought Content: Thought content normal.       Significant Labs: All pertinent labs within the past 24 hours have been reviewed.  A1C:   Recent Labs   Lab 01/26/22  0819 06/08/22  0036   HGBA1C 12.2* 10.0*     CBC:   Recent Labs   Lab 06/08/22  0036 06/09/22 0447   WBC 9.73 9.30   HGB 13.6* 13.0*   HCT 38.9* 38.6*    259     CMP:   Recent Labs   Lab 06/08/22  0036 06/09/22 0447   * 133*   K 4.7 4.8   CL 97 100   CO2 20* 24   * 287*   BUN 26* 18   CREATININE 1.3 1.0   CALCIUM 8.9 9.0   PROT 7.2 6.8   ALBUMIN 2.9* 2.7*   BILITOT 0.5 0.6   ALKPHOS 91 93   AST 17 14   ALT 19 14   ANIONGAP 12 9   EGFRNONAA >60.0 >60.0       Magnesium:   Recent Labs   Lab 06/09/22  0447   MG 1.8     POCT Glucose:   Recent Labs   Lab 06/08/22  1435 06/08/22  1632 06/08/22  2202   POCTGLUCOSE 356* 328* 270*       Significant Imaging: I have reviewed all pertinent imaging results/findings within the past 24 hours.    6/8 XR R foot:   Impression:     Abnormal appearance of the soft tissues of the 5th digit most concerning for infectious process, including air density within the soft tissues.     Abnormality involving the lateral aspect of the distal portion of the proximal phalanx of the 5th digit, most concerning for osseous infectious involvement, osteomyelitis for which clinical and historical correlation is needed.    6/8 Arterial U/S:  Impression:     Ankle-brachial index of 1.1 on the right and 1.2 on the  left.     No hemodynamically significant stenosis demonstrated in the right or left lower extremity arterial system.

## 2022-06-09 NOTE — ASSESSMENT & PLAN NOTE
56 year old man with poorly controlled T2DM, HTN, CAD here for right 5th digit wound first noticed a few days prior to presentation. VSS, no leukocytosis. Initial ESR 78, CRP 70. Received cefepime in ED. Notified by Radiology that MRI shows osteomyelitis of the digit without abscess. Discussed finding with patient, who is open to amputation if necessary.  - imaging concerning for nec fasciitis    Plan  - Podiatry consulted, patient to go for amputation of R toe on 6/9  - NPO and hold DVT ppx for now  - f/u blood cx x2 and toe cx  - continue vanc/cefepime/clinda

## 2022-06-09 NOTE — NURSING TRANSFER
Nursing Transfer Note      6/9/2022     Reason patient is being transferred: back to bed    Transfer from  15 to [id number]    Transfer via stretcher    Transfer with n/a    Transported by pt escort    Medicines sent: none     Any special needs or follow-up needed: n/a    Chart send with patient: Yes    Notified: VICTOR HUGO Nurse Romy [id number]    Patient reassessed at: 6/9/22 @ [id number]

## 2022-06-09 NOTE — PROGRESS NOTES
[facility] ([address] Medicine  Progress Note    Patient Name: Billy Rivera  MRN: [id number]  Patient Class: IP- Inpatient   Admission Date: 6/7/2022  Length of Stay: 1 days  Attending Physician: Suzy Root MD  Primary Care Provider: BRAD Baker MD        Subjective:     Principal Problem:Osteomyelitis of right foot        HPI:  Billy Rivera is a 56 year old man with T2DM (last A1c 12), HTN, CAD, MICHAELA not on CPAP who presented for right fifth toe wound. He first noticed the wound 3 days ago while changing the bandage, then on day of presentation noticed that a blister had ruptured and that it looked worse. Saw clear drainage. No fevers, chills, nausea, or foot pain.     Works as a . Lives with his wife. Ambulates without assistive device, independent for ADLs.    In ED VSS, labs notable for ESR 78 and CRP 70. No leukocytosis. Blood cultures drawn, toe culture sent. Received dose of cefepime and 1L NS. XR showed cortical breakdown. MRI ordered.      Overview/Hospital Course:  Patient admitted to [facility] for OM. MRI of foot showed concern for gas (greater concern for nec fasc) so patient started on broad spectrum abx with vanc/cefepime/clinda. Podiatry consulted and agree with OM and patient to go to OR for toe amputation on 6/9. Endocrine following for hyperglycemia in setting of uncontrolled T2DM on an insulin pump.      Interval History: Symptoms:  Same.  Diet:  Adequate intake.    Activity level:  Impaired due to wound  Pain:  No pain.       Review of Systems   Constitutional:  Positive for activity change and fatigue. Negative for appetite change.   Respiratory:  Negative for cough and shortness of breath.    Cardiovascular:  Negative for chest pain and leg swelling.   Gastrointestinal:  Negative for abdominal distention and abdominal pain.   Musculoskeletal:  Positive for arthralgias and joint swelling. Negative for back pain.   Skin:  Positive  for wound.   Neurological:  Negative for dizziness and weakness.   Psychiatric/Behavioral:  Negative for agitation and confusion.    Objective:     Vital Signs (Most Recent):  Temp: 97.8 °F (36.6 °C) (06/09/22 1125)  Pulse: (!) 56 (06/09/22 1125)  Resp: 18 (06/09/22 1125)  BP: 115/72 (06/09/22 1125)  SpO2: (!) 94 % (06/09/22 1125)   Vital Signs (24h Range):  Temp:  [97.8 °F (36.6 °C)-98.7 °F (37.1 °C)] 97.8 °F (36.6 °C)  Pulse:  [56-69] 56  Resp:  [18-20] 18  SpO2:  [94 %-97 %] 94 %  BP: (115-158)/(51-92) 115/72     Weight: 128.1 kg (282 lb 8.3 oz)  Body mass index is 36.27 kg/m².  No intake or output data in the 24 hours ending 06/09/22 1318   Physical Exam  Vitals and nursing note reviewed.   Constitutional:       Appearance: He is obese. He is ill-appearing.   HENT:      Mouth/Throat:      Mouth: Mucous membranes are moist.      Pharynx: Oropharynx is clear.   Eyes:      Extraocular Movements: Extraocular movements intact.      Conjunctiva/sclera: Conjunctivae normal.      Pupils: Pupils are equal, round, and reactive to light.   Cardiovascular:      Rate and Rhythm: Normal rate and regular rhythm.      Heart sounds: No murmur heard.  Pulmonary:      Effort: Pulmonary effort is normal. No respiratory distress.      Breath sounds: Normal breath sounds. No rales.   Abdominal:      General: Abdomen is flat. There is no distension.      Palpations: Abdomen is soft.      Tenderness: There is no abdominal tenderness.   Musculoskeletal:         General: Normal range of motion.      Cervical back: Normal range of motion and neck supple.      Right lower leg: Edema present.      Left lower leg: Edema present.      Comments: Warmth on RLE, rash indicative of possible cellulitis   Neurological:      General: No focal deficit present.      Mental Status: He is alert and oriented to person, place, and time.   Psychiatric:         Mood and Affect: Mood normal.         Behavior: Behavior normal.         Thought Content: Thought  content normal.       Significant Labs: All pertinent labs within the past 24 hours have been reviewed.  A1C:   Recent Labs   Lab 01/26/22  0819 06/08/22  0036   HGBA1C 12.2* 10.0*     CBC:   Recent Labs   Lab 06/08/22  0036 06/09/22  0447   WBC 9.73 9.30   HGB 13.6* 13.0*   HCT 38.9* 38.6*    259     CMP:   Recent Labs   Lab 06/08/22  0036 06/09/22  0447   * 133*   K 4.7 4.8   CL 97 100   CO2 20* 24   * 287*   BUN 26* 18   CREATININE 1.3 1.0   CALCIUM 8.9 9.0   PROT 7.2 6.8   ALBUMIN 2.9* 2.7*   BILITOT 0.5 0.6   ALKPHOS 91 93   AST 17 14   ALT 19 14   ANIONGAP 12 9   EGFRNONAA >60.0 >60.0       Magnesium:   Recent Labs   Lab 06/09/22 0447   MG 1.8     POCT Glucose:   Recent Labs   Lab 06/08/22  1435 06/08/22  1632 06/08/22  2202   POCTGLUCOSE 356* 328* 270*       Significant Imaging: I have reviewed all pertinent imaging results/findings within the past 24 hours.    6/8 XR R foot:   Impression:     Abnormal appearance of the soft tissues of the 5th digit most concerning for infectious process, including air density within the soft tissues.     Abnormality involving the lateral aspect of the distal portion of the proximal phalanx of the 5th digit, most concerning for osseous infectious involvement, osteomyelitis for which clinical and historical correlation is needed.    6/8 Arterial U/S:  Impression:     Ankle-brachial index of 1.1 on the right and 1.2 on the left.     No hemodynamically significant stenosis demonstrated in the right or left lower extremity arterial system.      Assessment/Plan:      * Osteomyelitis of right foot  56 year old man with poorly controlled T2DM, HTN, CAD here for right 5th digit wound first noticed a few days prior to presentation. VSS, no leukocytosis. Initial ESR 78, CRP 70. Received cefepime in ED. Notified by Radiology that MRI shows osteomyelitis of the digit without abscess. Discussed finding with patient, who is open to amputation if necessary.  - imaging  concerning for nec fasciitis    Plan  - Podiatry consulted, patient to go for amputation of R toe on 6/9  - NPO and hold DVT ppx for now  - f/u blood cx x2 and toe cx  - continue vanc/cefepime/clinda       Ulcer of right foot with fat layer exposed  Appreciate podiatry recs regarding wound care      CAD (coronary artery disease)  - continue home ASA 81 mg, atorvastatin 80 mg      Type 2 diabetes mellitus with diabetic peripheral angiopathy without gangrene, with long-term current use of insulin  Last A1c 12.2 from 1/2022. Taking U-100 insulin via insulin pump and metformin 1,000 mg BID prior to admission.  - A1C of 10.0  - Endo consulted in setting of hyperglycemia and uncontrolled T2DM while on insulin pump    Plan  - continue to appreciate Endo recs  - detemir 15 BID, aspart 10 TIDWM with MDSSI  - accuchecks QID  - goal -180  - diabetic diet once no longer NPO      Hypertension associated with diabetes  - continue home coreg 12.5 mg BID, lisinopril 40 mg QD      Sleep apnea  Has tried CPAP in the past, not using at home.        VTE Risk Mitigation (From admission, onward)         Ordered     Reason for No Pharmacological VTE Prophylaxis  Once        Question:  Reasons:  Answer:  Physician Provided (leave comment)  Comment:  held for possible procedure    06/08/22 0428     IP VTE HIGH RISK PATIENT  Once         06/08/22 0428     Place sequential compression device  Until discontinued         06/08/22 0428                Discharge Planning   DARVIN: 6/13/2022     Code Status: Full Code   Is the patient medically ready for discharge?:     Reason for patient still in hospital (select all that apply): Patient trending condition, Treatment, Consult recommendations and PT / OT recommendations                     Latrice Bernard MD  Department of Hospital Medicine   [facility] - Intensive Care ([place])

## 2022-06-10 VITALS
SYSTOLIC BLOOD PRESSURE: 125 MMHG | HEIGHT: 74 IN | WEIGHT: 278.31 LBS | HEART RATE: 57 BPM | TEMPERATURE: 98 F | DIASTOLIC BLOOD PRESSURE: 69 MMHG | RESPIRATION RATE: 18 BRPM | OXYGEN SATURATION: 93 % | BODY MASS INDEX: 35.72 KG/M2

## 2022-06-10 LAB
ALBUMIN SERPL BCP-MCNC: 2.5 G/DL (ref 3.5–5.2)
ALP SERPL-CCNC: 90 U/L (ref 55–135)
ALT SERPL W/O P-5'-P-CCNC: 16 U/L (ref 10–44)
ANION GAP SERPL CALC-SCNC: 10 MMOL/L (ref 8–16)
AST SERPL-CCNC: 14 U/L (ref 10–40)
BACTERIA SPEC AEROBE CULT: ABNORMAL
BASOPHILS # BLD AUTO: 0.05 K/UL (ref 0–0.2)
BASOPHILS NFR BLD: 0.5 % (ref 0–1.9)
BILIRUB SERPL-MCNC: 0.6 MG/DL (ref 0.1–1)
BUN SERPL-MCNC: 16 MG/DL (ref 6–20)
CALCIUM SERPL-MCNC: 8.7 MG/DL (ref 8.7–10.5)
CHLORIDE SERPL-SCNC: 100 MMOL/L (ref 95–110)
CO2 SERPL-SCNC: 23 MMOL/L (ref 23–29)
CREAT SERPL-MCNC: 0.9 MG/DL (ref 0.5–1.4)
DIFFERENTIAL METHOD: ABNORMAL
EOSINOPHIL # BLD AUTO: 0.2 K/UL (ref 0–0.5)
EOSINOPHIL NFR BLD: 2.1 % (ref 0–8)
ERYTHROCYTE [DISTWIDTH] IN BLOOD BY AUTOMATED COUNT: 12.2 % (ref 11.5–14.5)
EST. GFR  (AFRICAN AMERICAN): >60 ML/MIN/1.73 M^2
EST. GFR  (NON AFRICAN AMERICAN): >60 ML/MIN/1.73 M^2
GLUCOSE SERPL-MCNC: 298 MG/DL (ref 70–110)
HCT VFR BLD AUTO: 37.6 % (ref 40–54)
HGB BLD-MCNC: 12.5 G/DL (ref 14–18)
IMM GRANULOCYTES # BLD AUTO: 0.08 K/UL (ref 0–0.04)
IMM GRANULOCYTES NFR BLD AUTO: 0.9 % (ref 0–0.5)
LYMPHOCYTES # BLD AUTO: 1.2 K/UL (ref 1–4.8)
LYMPHOCYTES NFR BLD: 12.7 % (ref 18–48)
MAGNESIUM SERPL-MCNC: 1.6 MG/DL (ref 1.6–2.6)
MCH RBC QN AUTO: 29.8 PG (ref 27–31)
MCHC RBC AUTO-ENTMCNC: 33.2 G/DL (ref 32–36)
MCV RBC AUTO: 90 FL (ref 82–98)
MONOCYTES # BLD AUTO: 0.9 K/UL (ref 0.3–1)
MONOCYTES NFR BLD: 10.3 % (ref 4–15)
NEUTROPHILS # BLD AUTO: 6.7 K/UL (ref 1.8–7.7)
NEUTROPHILS NFR BLD: 73.5 % (ref 38–73)
NRBC BLD-RTO: 0 /100 WBC
PHOSPHATE SERPL-MCNC: 2.6 MG/DL (ref 2.7–4.5)
PLATELET # BLD AUTO: 204 K/UL (ref 150–450)
PMV BLD AUTO: 10 FL (ref 9.2–12.9)
POCT GLUCOSE: 263 MG/DL (ref 70–110)
POCT GLUCOSE: 322 MG/DL (ref 70–110)
POCT GLUCOSE: 335 MG/DL (ref 70–110)
POTASSIUM SERPL-SCNC: 4.9 MMOL/L (ref 3.5–5.1)
PROT SERPL-MCNC: 6.4 G/DL (ref 6–8.4)
RBC # BLD AUTO: 4.2 M/UL (ref 4.6–6.2)
SODIUM SERPL-SCNC: 133 MMOL/L (ref 136–145)
VANCOMYCIN TROUGH SERPL-MCNC: 12.6 UG/ML (ref 10–22)
WBC # BLD AUTO: 9.12 K/UL (ref 3.9–12.7)

## 2022-06-10 PROCEDURE — 63600175 PHARM REV CODE 636 W HCPCS: Performed by: INTERNAL MEDICINE

## 2022-06-10 PROCEDURE — 36415 COLL VENOUS BLD VENIPUNCTURE: CPT | Performed by: STUDENT IN AN ORGANIZED HEALTH CARE EDUCATION/TRAINING PROGRAM

## 2022-06-10 PROCEDURE — 83735 ASSAY OF MAGNESIUM: CPT | Performed by: STUDENT IN AN ORGANIZED HEALTH CARE EDUCATION/TRAINING PROGRAM

## 2022-06-10 PROCEDURE — 85025 COMPLETE CBC W/AUTO DIFF WBC: CPT | Performed by: STUDENT IN AN ORGANIZED HEALTH CARE EDUCATION/TRAINING PROGRAM

## 2022-06-10 PROCEDURE — 25000003 PHARM REV CODE 250: Performed by: STUDENT IN AN ORGANIZED HEALTH CARE EDUCATION/TRAINING PROGRAM

## 2022-06-10 PROCEDURE — 25000003 PHARM REV CODE 250: Performed by: INTERNAL MEDICINE

## 2022-06-10 PROCEDURE — 25000003 PHARM REV CODE 250

## 2022-06-10 PROCEDURE — 99239 PR HOSPITAL DISCHARGE DAY,>30 MIN: ICD-10-PCS | Mod: ,,, | Performed by: INTERNAL MEDICINE

## 2022-06-10 PROCEDURE — 63600175 PHARM REV CODE 636 W HCPCS

## 2022-06-10 PROCEDURE — 99233 SBSQ HOSP IP/OBS HIGH 50: CPT | Mod: ,,, | Performed by: NURSE PRACTITIONER

## 2022-06-10 PROCEDURE — 99233 PR SUBSEQUENT HOSPITAL CARE,LEVL III: ICD-10-PCS | Mod: ,,, | Performed by: NURSE PRACTITIONER

## 2022-06-10 PROCEDURE — 99239 HOSP IP/OBS DSCHRG MGMT >30: CPT | Mod: ,,, | Performed by: INTERNAL MEDICINE

## 2022-06-10 PROCEDURE — 80053 COMPREHEN METABOLIC PANEL: CPT | Performed by: STUDENT IN AN ORGANIZED HEALTH CARE EDUCATION/TRAINING PROGRAM

## 2022-06-10 PROCEDURE — 84100 ASSAY OF PHOSPHORUS: CPT | Performed by: STUDENT IN AN ORGANIZED HEALTH CARE EDUCATION/TRAINING PROGRAM

## 2022-06-10 RX ORDER — IBUPROFEN 200 MG
24 TABLET ORAL
Status: DISCONTINUED | OUTPATIENT
Start: 2022-06-10 | End: 2022-06-10 | Stop reason: HOSPADM

## 2022-06-10 RX ORDER — GLUCAGON 1 MG
1 KIT INJECTION
Status: DISCONTINUED | OUTPATIENT
Start: 2022-06-10 | End: 2022-06-10 | Stop reason: HOSPADM

## 2022-06-10 RX ORDER — INSULIN ASPART 100 [IU]/ML
0-10 INJECTION, SOLUTION INTRAVENOUS; SUBCUTANEOUS
Status: DISCONTINUED | OUTPATIENT
Start: 2022-06-10 | End: 2022-06-10 | Stop reason: HOSPADM

## 2022-06-10 RX ORDER — INSULIN ASPART 100 [IU]/ML
16 INJECTION, SOLUTION INTRAVENOUS; SUBCUTANEOUS
Status: DISCONTINUED | OUTPATIENT
Start: 2022-06-10 | End: 2022-06-10 | Stop reason: HOSPADM

## 2022-06-10 RX ORDER — IBUPROFEN 200 MG
16 TABLET ORAL
Status: DISCONTINUED | OUTPATIENT
Start: 2022-06-10 | End: 2022-06-10 | Stop reason: HOSPADM

## 2022-06-10 RX ORDER — DOXYCYCLINE 100 MG/1
100 CAPSULE ORAL 2 TIMES DAILY
Qty: 10 CAPSULE | Refills: 0 | Status: SHIPPED | OUTPATIENT
Start: 2022-06-10 | End: 2022-06-15 | Stop reason: ALTCHOICE

## 2022-06-10 RX ADMIN — ASPIRIN 81 MG: 81 TABLET, COATED ORAL at 08:06

## 2022-06-10 RX ADMIN — INSULIN ASPART 8 UNITS: 100 INJECTION, SOLUTION INTRAVENOUS; SUBCUTANEOUS at 12:06

## 2022-06-10 RX ADMIN — LISINOPRIL 40 MG: 20 TABLET ORAL at 08:06

## 2022-06-10 RX ADMIN — CARVEDILOL 12.5 MG: 12.5 TABLET, FILM COATED ORAL at 08:06

## 2022-06-10 RX ADMIN — INSULIN ASPART 12 UNITS: 100 INJECTION, SOLUTION INTRAVENOUS; SUBCUTANEOUS at 12:06

## 2022-06-10 RX ADMIN — TACROLIMUS 900 MG: 0.5 CAPSULE ORAL at 03:06

## 2022-06-10 RX ADMIN — INSULIN ASPART 6 UNITS: 100 INJECTION, SOLUTION INTRAVENOUS; SUBCUTANEOUS at 08:06

## 2022-06-10 RX ADMIN — VANCOMYCIN HYDROCHLORIDE 1750 MG: 500 INJECTION, POWDER, LYOPHILIZED, FOR SOLUTION INTRAVENOUS at 01:06

## 2022-06-10 RX ADMIN — CEFEPIME HYDROCHLORIDE 2 G: 2 INJECTION, SOLUTION INTRAVENOUS at 04:06

## 2022-06-10 RX ADMIN — INSULIN ASPART 12 UNITS: 100 INJECTION, SOLUTION INTRAVENOUS; SUBCUTANEOUS at 08:06

## 2022-06-10 RX ADMIN — TACROLIMUS 900 MG: 0.5 CAPSULE ORAL at 09:06

## 2022-06-10 NOTE — ASSESSMENT & PLAN NOTE
Assessment:   -IDSA moderate diabetic foot infection with osteomyelitis in all right 5th phalanges, cellulitis, anaerobic component to infection with gas on imaging. Cultures pending. No systemic infection.   -Right plantar foot wound down to subcutaneous tissue, not infected.       Plan:  Now s/p right partial 5th ray amputation on 6/9/2022.  Ok to discharge patient from podiatry standpoint given infection source control achieved with proximal amputation  Recommend discharge with 5 day course of PO doxycycline 100 mg BID  Partial weightbearing to right heel for transfers or short distances only.  -Elevate RLE at all times while in bed or chair.   -Podiatry will follow.     DC Instructions:  Patient is to follow up with podiatry within 7-10 days of discharge with Dr. Melara.  Podiatry will arrange an appointment.  Home health/facility to do dressing changes every MWF and prn as follows:  Cleanse right foot incision site and plantar foot wound with saline or wound cleanser, paint wounds with betadine, cover with xeroform, 4x4 gauze, cast padding x2, coban in a football dressing

## 2022-06-10 NOTE — DISCHARGE SUMMARY
[facility] - Intensive Care ([place] [address])  [facility] Medicine  Discharge Summary      Patient Name: Billy Rivera  MRN: [id number]  Patient Class: IP- Inpatient  Admission Date: 6/7/2022  Hospital Length of Stay: 2 days  Discharge Date and Time:  06/10/2022 1:03 PM  Attending Physician: Suzy Root MD   Discharging Provider: Martin Rosales MD  Primary Care Provider: BRAD Baker MD  [facility] Medicine Team: [facility] 4 Martin Rosales MD    HPI:   Billy Rivera is a 56 year old man with T2DM (last A1c 12), HTN, CAD, MICHAELA not on CPAP who presented for right fifth toe wound. He first noticed the wound 3 days ago while changing the bandage, then on day of presentation noticed that a blister had ruptured and that it looked worse. Saw clear drainage. No fevers, chills, nausea, or foot pain.     Works as a . Lives with his wife. Ambulates without assistive device, independent for ADLs.    In ED VSS, labs notable for ESR 78 and CRP 70. No leukocytosis. Blood cultures drawn, toe culture sent. Received dose of cefepime and 1L NS. XR showed cortical breakdown. MRI ordered.      Procedure(s) (LRB):  AMPUTATION, TOE - Dr. Melara @ [facility] in am (Right)      Hospital Course:   Patient admitted to [facility] for OM. MRI of foot showed concern for gas (greater concern for nec fasc) so patient started on broad spectrum abx with vanc/cefepime/clinda. Podiatry consulted and agree with OM and patient to go to OR for toe amputation on 6/9. Endocrine following for hyperglycemia in setting of uncontrolled T2DM on an insulin pump. Per podiatry, the patient tolerated the amputation quite well and was ambulating soon after surgery. Per their recommendation, all IV antibiotics were stopped, and the patient was started on doxycyline for 5 days. Patient educated on post-operative management and discharged home with a walker 6/10.       Goals of Care Treatment Preferences:  Code Status: Full Code      Consults:  "  Consults (From admission, onward)        Status Ordering Provider     Pharmacy to dose Vancomycin consult  Once        Provider:  (Not yet assigned)   "And" Linked Group Details    Completed BEATRIZ HAYNES     Inpatient consult to Endocrinology  Once        Provider:  (Not yet assigned)    Completed PROSPER CAO     Inpatient consult to Podiatry  Once        Provider:  (Not yet assigned)    Completed DESHAUN GAUTAM          * Osteomyelitis of right foot  56 year old man with poorly controlled T2DM, HTN, CAD here for right 5th digit wound first noticed a few days prior to presentation. VSS, no leukocytosis. Initial ESR 78, CRP 70. Received cefepime in ED. Notified by Radiology that MRI shows osteomyelitis of the digit without abscess. Discussed finding with patient, who is open to amputation if necessary.  - imaging concerning for nec fasciitis    Plan  - Podiatry consulted, patient to go for amputation of R toe on 6/9  - NPO and hold DVT ppx for now  - f/u blood cx x2 and toe cx  - discharged with 5 days of doxycycline       Final Active Diagnoses:    Diagnosis Date Noted POA    PRINCIPAL PROBLEM:  Osteomyelitis of right foot [M86.9] 06/08/2022 Yes    Abscess of right foot [L02.611]  Yes    CAD (coronary artery disease) [I25.10] 06/08/2022 Yes     Chronic    Ulcer of right foot with fat layer exposed [L97.512] 06/08/2022 Yes    Type 2 diabetes mellitus with diabetic peripheral angiopathy without gangrene, with long-term current use of insulin [E11.51, Z79.4] 04/02/2017 Not Applicable     Chronic    Hypertension associated with diabetes [E11.59, I15.2] 03/30/2017 Yes     Chronic    Sleep apnea [G47.30] 06/15/2015 Yes     Chronic    Peripheral arterial disease [I73.9] 06/15/2015 Yes      Problems Resolved During this Admission:     Physical Exam  Vitals and nursing note reviewed.   Constitutional:       General: He is not in acute distress.     Appearance: Normal appearance. He is obese. He is not " "ill-appearing or toxic-appearing.   HENT:      Head: Normocephalic and atraumatic.   Eyes:      Pupils: Pupils are equal, round, and reactive to light.   Cardiovascular:      Rate and Rhythm: Normal rate and regular rhythm.      Heart sounds: Normal heart sounds. No murmur heard.    No friction rub. No gallop.   Pulmonary:      Effort: Pulmonary effort is normal. No respiratory distress.      Breath sounds: Normal breath sounds. No wheezing or rales.   Abdominal:      General: Abdomen is flat. There is no distension.      Palpations: Abdomen is soft.      Tenderness: There is no abdominal tenderness.   Feet:      Comments: S/p R 5th toe amputation  Skin:     General: Skin is warm and dry.   Neurological:      General: No focal deficit present.      Mental Status: He is alert and oriented to person, place, and time.   Psychiatric:         Mood and Affect: Mood normal.         Behavior: Behavior normal.         Thought Content: Thought content normal.         Discharged Condition: good    Disposition: Home-Health Care Svc    Follow Up:    Patient Instructions:      WALKER FOR HOME USE     Order Specific Question Answer Comments   Type of Walker: Adult (5'4"-6'6")    With wheels? Yes    Height: 6' 2" (1.88 m)    Weight: 122.5 kg (270 lb)    Length of need (1-99 months): 99    Does patient have medical equipment at home? none    Please check all that apply: Patient's condition impairs ambulation.        Significant Diagnostic Studies: Labs:   CMP   Recent Labs   Lab 06/09/22 0447 06/10/22  0246   * 133*   K 4.8 4.9    100   CO2 24 23   * 298*   BUN 18 16   CREATININE 1.0 0.9   CALCIUM 9.0 8.7   PROT 6.8 6.4   ALBUMIN 2.7* 2.5*   BILITOT 0.6 0.6   ALKPHOS 93 90   AST 14 14   ALT 14 16   ANIONGAP 9 10   ESTGFRAFRICA >60.0 >60.0   EGFRNONAA >60.0 >60.0    and CBC   Recent Labs   Lab 06/09/22  0447 06/10/22  0246   WBC 9.30 9.12   HGB 13.0* 12.5*   HCT 38.6* 37.6*    204       Pending Diagnostic " "Studies:     Procedure Component Value Units Date/Time    Specimen to Pathology, Surgery General Surgery [[id number]] Collected: 06/09/22 1515    Order Status: Sent Lab Status: In process Updated: 06/09/22 2144    Specimen: Tissue          Medications:  Reconciled Home Medications:      Medication List      START taking these medications    doxycycline 100 MG Cap  Commonly known as: VIBRAMYCIN  Take 1 capsule (100 mg total) by mouth 2 (two) times daily. for 5 days        CHANGE how you take these medications    carvediloL 12.5 MG tablet  Commonly known as: COREG  Take 1 tablet (12.5 mg total) by mouth 2 (two) times daily.  What changed: when to take this     pen needle, diabetic 31 gauge x 3/16" Ndle  Inject 1 each into the skin 3 (three) times daily before meals.  What changed: when to take this        CONTINUE taking these medications    ACCU-CHEK GUIDE TEST STRIPS Strp  Generic drug: blood sugar diagnostic  1 each by Misc.(Non-Drug; Combo Route) route 5 (five) times daily.     acetaminophen 325 MG tablet  Commonly known as: TYLENOL  Take 325 mg by mouth every 6 (six) hours as needed for Pain.     aspirin 81 MG EC tablet  Commonly known as: ECOTRIN  Take 1 tablet (81 mg total) by mouth once daily.     atorvastatin 80 MG tablet  Commonly known as: LIPITOR  Take 1 tablet (80 mg total) by mouth once daily. Take every night.     BC HEADACHE POWDER ORAL  Take 1 Package by mouth daily as needed (pain).     FREESTYLE MICHAEL 2 SENSOR Kit  Generic drug: flash glucose sensor  1 each by Misc.(Non-Drug; Combo Route) route every 14 (fourteen) days.     HumaLOG U-100 Insulin 100 unit/mL injection  Generic drug: insulin lispro  Inject 100 Units into the skin continuous. Gives via insulin pump only. Do not Directly inject.     ibuprofen 200 MG tablet  Commonly known as: ADVIL,MOTRIN  Take 400-600 mg by mouth daily as needed for Pain.     lancets Misc  Commonly known as: ACCU-CHEK FASTCLIX LANCET DRUM  1 each by Misc.(Non-Drug; " Combo Route) route Daily.     LANTUS SOLOSTAR U-100 INSULIN glargine 100 units/mL (3mL) SubQ pen  Generic drug: insulin  Inject 60 Units into the skin once daily.     lisinopriL 40 MG tablet  Commonly known as: PRINIVIL,ZESTRIL  Take 1 tablet by mouth once daily     MEN'S MULTIVITAMIN ORAL  Take 1 tablet by mouth once daily.     metFORMIN 1000 MG tablet  Commonly known as: GLUCOPHAGE  Take 1 tablet (1,000 mg total) by mouth 2 (two) times daily with meals.     MINIMED 770G INSULIN PUMP Misc  Generic drug: subcutaneous insulin pump  1 each by Misc.(Non-Drug; Combo Route) route continuous. Medically necessary for management of Type 2 diabetes, E11.65     Pepto-BismoL 262 mg/15 mL suspension  Generic drug: bismuth subsalicylate  Take 15 mLs by mouth every other day. (As needed for diarrhea)     VITAMIN C 250 MG tablet  Generic drug: ascorbic acid (vitamin C)  Take 500 mg by mouth once daily.            Indwelling Lines/Drains at time of discharge:   Lines/Drains/Airways     None                 Time spent on the discharge of patient: 15 minutes         Martin Rosales MD  Department of Hospital Medicine  [facility] - Intensive Care ([place]-16)

## 2022-06-10 NOTE — CONSULTS
Therapy with Vancomycin complete and/or consult discontinued by provider.  Pharmacy will sign off, please re-consult as needed.    Thank you,  Shiela Xiao, PharmD, [facility]  Internal Medicine Clinical Pharmacy Specialist   Ext [phone]

## 2022-06-10 NOTE — NURSING
Notified Dr. Huang of low HR of 47. Pt is currently asymptomatic, no c/o any at this time. Will monitor.

## 2022-06-10 NOTE — PROGRESS NOTES
[facility] ([place] [address])  Podiatry  Progress Note    Patient Name: Billy Sheffield Miguel  MRN: [id number]  Admission Date: 6/7/2022  Hospital Length of Stay: 2 days  Attending Physician: Suzy Root MD  Primary Care Provider: BRAD Baker MD     Subjective:     Interval History:   S/p right partial 5th ray amputation on 6/9/2022. NAEON. Remains AFVSS. No leukocytosis. Intraop cultures pending. Denies NVFC. Denies pain. No other concerns.     Follow-up For: Procedure(s) (LRB):  AMPUTATION, TOE   Post-Operative Day: 1 Day Post-Op    Scheduled Meds:   aspirin  81 mg Oral Daily    atorvastatin  80 mg Oral QHS    carvediloL  12.5 mg Oral BID    ceFEPime (MAXIPIME) IVPB  2 g Intravenous Q8H    clindamycin (CLEOCIN) IVPB  900 mg Intravenous Q8H    insulin aspart U-100  12 Units Subcutaneous TIDWM    insulin detemir U-100  20 Units Subcutaneous BID    lisinopriL  40 mg Oral Daily    vancomycin (VANCOCIN) IVPB  2,000 mg Intravenous Q12H     Continuous Infusions:  PRN Meds:dextrose 10%, dextrose 10%, glucagon (human recombinant), glucose, glucose, insulin aspart U-100, melatonin, naloxone, senna-docusate 8.6-50 mg, sodium chloride 0.9%, Pharmacy to dose Vancomycin consult **AND** vancomycin - pharmacy to dose    Review of Systems   Constitutional:  Negative for activity change, appetite change, chills and fever.   Respiratory:  Negative for cough and shortness of breath.    Cardiovascular:  Negative for leg swelling.   Gastrointestinal:  Negative for nausea and vomiting.   Musculoskeletal:  Negative for arthralgias, gait problem and myalgias.   Skin:  Positive for color change and wound.   Neurological:  Positive for numbness. Negative for weakness.   Psychiatric/Behavioral:  Negative for confusion.    Objective:     Vital Signs (Most Recent):  Temp: 98 °F (36.7 °C) (06/10/22 0818)  Pulse: 61 (06/10/22 0818)  Resp: 16 (06/10/22 0818)  BP: (!) 144/86 (06/10/22 0818)  SpO2: (!) 94 % (06/10/22  0818)   Vital Signs (24h Range):  Temp:  [97.5 °F (36.4 °C)-98.4 °F (36.9 °C)] 98 °F (36.7 °C)  Pulse:  [47-63] 61  Resp:  [16-20] 16  SpO2:  [93 %-100 %] 94 %  BP: (101-144)/(57-86) 144/86     Weight: 122.5 kg (270 lb)  Body mass index is 34.67 kg/m².    Foot Exam    General  Orientation: alert and oriented to person, place, and time       Right Foot/Ankle     Inspection and Palpation  Tenderness: none   Swelling: (localized edema surrounding 5th ray amputation site)  Skin Exam: cellulitis, skin changes, ulcer and erythema; no drainage, skin not intact and no abnormal color     Neurovascular  Dorsalis pedis: doppler  Posterior tibial: doppler  Saphenous nerve sensation: diminished  Tibial nerve sensation: diminished  Superficial peroneal nerve sensation: diminished  Deep peroneal nerve sensation: diminished  Sural nerve sensation: diminished    Comments  Partial 5th ray amputated    Left Foot/Ankle      Inspection and Palpation  Tenderness: none   Swelling: none   Skin Exam: skin intact; no drainage, no cellulitis, no ulcer and no erythema     Neurovascular  Dorsalis pedis: doppler  Posterior tibial: doppler  Saphenous nerve sensation: diminished  Tibial nerve sensation: diminished  Superficial peroneal nerve sensation: diminished  Deep peroneal nerve sensation: diminished  Sural nerve sensation: diminished    Comments  Partial 1st ray amputated    Laboratory:  CBC:   Recent Labs   Lab 06/10/22  0246   WBC 9.12   RBC 4.20*   HGB 12.5*   HCT 37.6*      MCV 90   MCH 29.8   MCHC 33.2     CRP:   Recent Labs   Lab 06/08/22  0036   CRP 69.6*     ESR:   Recent Labs   Lab 06/08/22  0036   SEDRATE 78*     Wound Cultures:   Recent Labs   Lab 03/16/22  1136 06/08/22  0206 06/08/22  1231 06/09/22  1514   LABAERO ENTEROBACTER CLOACAE  Moderate  * GRAM NEGATIVE VIVIAN  Many  Identification and susceptibility pending  *  STREPTOCOCCUS AGALACTIAE (GROUP B)  Many  Beta-hemolytic streptococci are routinely susceptible to    penicillins,cephalosporins and carbapenems.  Susceptibility testing not routinely performed  * STREPTOCOCCUS AGALACTIAE (GROUP B)  Many  Beta-hemolytic streptococci are routinely susceptible to   penicillins,cephalosporins and carbapenems.  Susceptibility testing not routinely performed  *  GRAM NEGATIVE VIVIAN  Few  Identification and susceptibility pending  * No growth     Microbiology Results (last 7 days)       Procedure Component Value Units Date/Time    Aerobic culture [[id number]] Collected: 06/09/22 1514    Order Status: Completed Specimen: Bone from Toe, Right Foot Updated: 06/10/22 0826     Aerobic Bacterial Culture No growth    Narrative:      Right 5th metatarsal clean margin    Culture, Anaerobe [[id number]] Collected: 06/09/22 1514    Order Status: Completed Specimen: Bone from Toe, Right Foot Updated: 06/10/22 0715     Anaerobic Culture Culture in progress    Narrative:      Right 5th metatarsal clean margin    Blood culture #1 **CANNOT BE ORDERED STAT** [[id number]] Collected: 06/08/22 0036    Order Status: Completed Specimen: Blood from Peripheral, Antecubital, Left Updated: 06/10/22 0612     Blood Culture, Routine No Growth to date      No Growth to date      No Growth to date    Blood culture #2 **CANNOT BE ORDERED STAT** [[id number]] Collected: 06/08/22 0036    Order Status: Completed Specimen: Blood from Peripheral, Antecubital, Left Updated: 06/10/22 0612     Blood Culture, Routine No Growth to date      No Growth to date      No Growth to date    AFB Culture & Smear [[id number]] Collected: 06/08/22 1231    Order Status: Completed Specimen: Wound from Foot, Right Updated: 06/09/22 2127     AFB Culture & Smear Culture in progress     AFB CULTURE STAIN No acid fast bacilli seen.    Gram stain [[id number]] Collected: 06/09/22 1514    Order Status: Completed Specimen: Bone from Toe, Right Foot Updated: 06/09/22 1628     Gram Stain Result No WBC's      No organisms seen    Narrative:      Right 5th  metatarsal clean margin    AFB Culture & Smear [[id number]] Collected: 06/09/22 1514    Order Status: Sent Specimen: Bone from Toe, Right Foot Updated: 06/09/22 1537    Fungus culture [[id number]] Collected: 06/09/22 1514    Order Status: Sent Specimen: Bone from Toe, Right Foot Updated: 06/09/22 1537    Culture, Anaerobe [[id number]] Collected: 06/08/22 1231    Order Status: Completed Specimen: Wound from Foot, Right Updated: 06/09/22 1127     Anaerobic Culture Culture in progress    Aerobic culture [[id number]]  (Abnormal) Collected: 06/08/22 1231    Order Status: Completed Specimen: Wound from Foot, Right Updated: 06/09/22 0956     Aerobic Bacterial Culture STREPTOCOCCUS AGALACTIAE (GROUP B)  Many  Beta-hemolytic streptococci are routinely susceptible to   penicillins,cephalosporins and carbapenems.  Susceptibility testing not routinely performed        GRAM NEGATIVE VIVIAN  Few  Identification and susceptibility pending      Aerobic culture [[id number]]  (Abnormal) Collected: 06/08/22 0206    Order Status: Completed Specimen: Wound from Toe, Right Foot Updated: 06/09/22 0801     Aerobic Bacterial Culture GRAM NEGATIVE VIVIAN  Many  Identification and susceptibility pending        STREPTOCOCCUS AGALACTIAE (GROUP B)  Many  Beta-hemolytic streptococci are routinely susceptible to   penicillins,cephalosporins and carbapenems.  Susceptibility testing not routinely performed      Gram stain [[id number]] Collected: 06/08/22 1231    Order Status: Completed Specimen: Wound from Foot, Right Updated: 06/08/22 1457     Gram Stain Result Many Gram positive cocci      Moderate No WBC's          Specimen (24h ago, onward)                 Start     Ordered    06/09/22 1515  Specimen to Pathology, Surgery General Surgery  Once        Comments: Pre-op Diagnosis: Foot infection [L08.9]Toe osteomyelitis, right [M86.9]Procedure(s):AMPUTATION, TOE - Dr. Melara @ [facility] in am Number of specimens: 2Name of specimens: 1. Right 5th toe - permanent  2. Right 5th metatarsal clean margin - permanent     References:    Click here for ordering Quick Tip   Question Answer Comment   Procedure Type: General Surgery    Which provider would you like to cc? ELMER BLAKELY    Release to patient Immediate        06/09/22 1591                    Diagnostic Results:  I have reviewed all pertinent imaging results/findings within the past 24 hours.  Imaging Results              US Lower Extrem Arteries Bilat with VASILIY (xpd) (Final result)  Result time 06/08/22 19:26:51      Final result by Aaron Weiner MD (06/08/22 19:26:51)                   Impression:      Ankle-brachial index of 1.1 on the right and 1.2 on the left.    No hemodynamically significant stenosis demonstrated in the right or left lower extremity arterial system.    Electronically signed by resident: Billy العراقي  Date:    06/08/2022  Time:    19:07    Electronically signed by: Aaron Weiner MD  Date:    06/08/2022  Time:    19:26               Narrative:    EXAMINATION:  US ARTERIAL LOWER EXTREMITY BILAT WITH VASILIY (XPD)    CLINICAL HISTORY:  PAD, R foot infection;    TECHNIQUE:  Bilateral lower extremity arterial duplex ultrasound examination performed. Multiple gray scale and color doppler images were obtained in addition to waveform analysis.  Ankle-brachial indices were calculated.    COMPARISON:  Lower extremity ultrasound 07/05/2020.    FINDINGS:  The ankle brachial index on the right is 1.1 and on the left is 1.2.    The peak systolic velocities on the right are as follows, in centimeters/second:    Common femoral artery: 104    Deep femoral artery: 78    Superficial femoral artery, proximal: 150    Superficial femoral artery, mid portion: 131    Superficial femoral artery, distal: 129    Popliteal artery: Proximal popliteal artery: 130; distal popliteal artery: 125    Posterior tibial artery: 122    Anterior tibial artery: 156    The peak systolic velocities on the left are as follows, in  centimeters/second:    Common femoral artery: 95    Deep femoral artery: 104    Superficial femoral artery, proximal: 141    Superficial femoral artery, mid portion: 120    Superficial femoral artery, distal: 88    Popliteal artery: Proximal popliteal: 88; distal popliteal: 103    Posterior tibial artery: 96    Anterior tibial artery: 79    Normal triphasic arterial waveforms are demonstrated.                                        MRI Foot (Forefoot) Right Without Contrast (Final result)  Result time 06/08/22 08:53:40      Final result by Diego oCrey MD (06/08/22 08:53:40)                   Impression:      Soft tissue defect involving the pinky toe with foci of subcutaneous emphysema, concerning for infection with a gas-forming organism.    Osteomyelitis of the proximal, middle, and distal phalanges in the pinky toe.    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    No abscess, septic arthritis, or tenosynovitis.    This report was flagged in Epic as abnormal.      Electronically signed by: Diego Corey  Date:    06/08/2022  Time:    08:53               Narrative:    EXAMINATION:  MRI FOOT (FOREFOOT) RIGHT WITHOUT CONTRAST    CLINICAL HISTORY:  Osteomyelitis, foot;    TECHNIQUE:  Multisequence multiplanar images of the forefoot, without intravenous contrast.    COMPARISON:  Foot radiographs 06/08/2022; MRI foot 06/01/2018    FINDINGS:  Sequences are degraded by patient motion artifact.  Soft tissue defect along the lateral aspect of the pinky toe, with adjacent skin thickening and subcutaneous edema.  There are numerous hypointense foci in the subcutaneous soft tissues, concerning for subcutaneous emphysema.  There is STIR hyperintense signal with corresponding T1 marrow replacement throughout the proximal, middle, and distal phalanges, with erosive changes at the PIP joint, compatible with osteomyelitis.  The 5th metatarsal is spared.    Additional soft tissue defect in the  plantar forefoot overlying the 2nd MTP joint.  There is STIR hyperintense signal in the head of the 2nd metatarsal and bases of the 2nd and 3rd proximal phalanges, likely reactive.    There is endosteal edema within the 4th metatarsal shaft, visible on both T2 and T1 weighted sequences.  No cortical signal changes or discrete fracture lines.  No osteonecrosis.  Partially imaged degenerative changes in the midfoot with areas of subchondral edema.    No significant joint effusion or synovitis.    No specific evidence of tenosynovitis.    There is extensive subcutaneous edema.  No organized fluid collections.  Diffuse muscle atrophy with corresponding STIR hyperintense signal, likely reflecting denervation however myositis could have a similar appearance.                                        X-Ray Foot Complete Right (Final result)  Result time 06/08/22 03:02:11      Final result by Mingo Adan MD (06/08/22 03:02:11)                   Impression:      Abnormal appearance of the soft tissues of the 5th digit most concerning for infectious process, including air density within the soft tissues.    Abnormality involving the lateral aspect of the distal portion of the proximal phalanx of the 5th digit, most concerning for osseous infectious involvement, osteomyelitis for which clinical and historical correlation is needed.    This report was flagged in Epic as abnormal.      Electronically signed by: Mingo Adan  Date:    06/08/2022  Time:    03:02               Narrative:    EXAMINATION:  XR FOOT COMPLETE 3 VIEW RIGHT    CLINICAL HISTORY:  . Local infection of the skin and subcutaneous tissue, unspecified    TECHNIQUE:  AP, lateral, and oblique views of the right foot were performed.    COMPARISON:  Radiograph right foot June 7, 2018    FINDINGS:  There is abnormal appearance of the soft tissues of the 5th digit of the right foot, irregular appearance of the soft tissues as well as appearance of air density  within the soft tissues, most concerning for infectious process given the history.  In addition there is abnormal appearance involving the lateral aspect of the distal portion of the proximal phalanx of the 5th digit, the appearance of which is most concerning for osseous infectious involvement.    The remainder of the visualized osseous structures appear intact, chronic appearing changes are noted there is no additional evidence for acute fracture or dislocation.  Soft tissue densities may relate to soft tissue calcifications and appears similar to the prior study.                                        Clinical Findings:  S/p partial 5th ray amputation on 6/9/2022. Sutures intact, skin well coapted, localized erythema and edema surrounding surgical site. Mild necrosis along central skin margins. Plantar 2nd MTPJ ulceration with granular wound base, no probe to bone, no erythema, no drainage. Down to level of subcutaneous tissue.             Assessment/Plan:     * Osteomyelitis of right foot  Assessment:   -IDSA moderate diabetic foot infection with osteomyelitis in all right 5th phalanges, cellulitis, anaerobic component to infection with gas on imaging. Cultures pending. No systemic infection.   -Right plantar foot wound down to subcutaneous tissue, not infected.       Plan:  Now s/p right partial 5th ray amputation on 6/9/2022.  Ok to discharge patient from podiatry standpoint given infection source control achieved with proximal amputation  Recommend discharge with 5 day course of PO doxycycline 100 mg BID  Partial weightbearing to right heel for transfers or short distances only.  -Elevate RLE at all times while in bed or chair.   -Podiatry will follow.     DC Instructions:  Patient is to follow up with podiatry within 7-10 days of discharge with Dr. Melara.  Podiatry will arrange an appointment.  Home health/facility to do dressing changes every MWF and prn as follows:  Cleanse right foot incision site and  plantar foot wound with saline or wound cleanser, paint wounds with betadine, cover with xeroform, 4x4 gauze, cast padding x2, coban in a football dressing         Ulcer of right foot with fat layer exposed  See rest of plan    Peripheral arterial disease  Ankle-brachial index of 1.1 on the right and 1.2 on the left.     No hemodynamically significant stenosis demonstrated in the right or left lower extremity arterial system.    Patient should have adequate perfusion to heal amputation site.        Phoebe Murphy DPM  [facility]  Podiatry PGY3  Pager: [phone]  Spectra:[phone]

## 2022-06-10 NOTE — SUBJECTIVE & OBJECTIVE
"Interval HPI:   Overnight events:  BG remains elevated and is trending upwards s/p amputation surgery yesterday despite recent insulin dosing adjustments. Endocrinology will continue to follow, and manage glycemic control while inpatient.   Diet diabetic [facility]; 2000 Calorie  1 Day Post-Op    Eatin%  Nausea: No  Hypoglycemia and intervention: No  Fever: No  TPN and/or TF: No  If yes, type of TF/TPN and rate: None    /69 (BP Location: Left arm, Patient Position: Sitting)   Pulse (!) 57   Temp 98.2 °F (36.8 °C) (Oral)   Resp 18   Ht 6' 2" (1.88 m)   Wt 126.2 kg (278 lb 5.3 oz)   SpO2 (!) 93%   BMI 35.74 kg/m²     Labs Reviewed and Include    Recent Labs   Lab 06/10/22  0246   *   CALCIUM 8.7   ALBUMIN 2.5*   PROT 6.4   *   K 4.9   CO2 23      BUN 16   CREATININE 0.9   ALKPHOS 90   ALT 16   AST 14   BILITOT 0.6     Lab Results   Component Value Date    WBC 9.12 06/10/2022    HGB 12.5 (L) 06/10/2022    HCT 37.6 (L) 06/10/2022    MCV 90 06/10/2022     06/10/2022     No results for input(s): TSH, FREET4 in the last 168 hours.  Lab Results   Component Value Date    HGBA1C 10.0 (H) 2022       Nutritional status:   Body mass index is 35.74 kg/m².  Lab Results   Component Value Date    ALBUMIN 2.5 (L) 06/10/2022    ALBUMIN 2.7 (L) 2022    ALBUMIN 2.9 (L) 2022     Lab Results   Component Value Date    PREALBUMIN 22 2018    PREALBUMIN 21 2018    PREALBUMIN 20 2018       Estimated Creatinine Clearance: 126.3 mL/min (based on SCr of 0.9 mg/dL).    Accu-Checks  Recent Labs     22  1435 22  1632 22  2202 22  0725 22  1415 22  1538 22  1735 22  2004 06/10/22  0737 06/10/22  1145   POCTGLUCOSE 356* 328* 270* 246* 335* 300* 260* 297* 263* 322*       Current Medications and/or Treatments Impacting Glycemic Control  Immunotherapy:    Immunosuppressants       None          Steroids:   Hormones (From " admission, onward)                Start     Stop Route Frequency Ordered    06/08/22 0526  melatonin tablet 6 mg         -- Oral Nightly PRN 06/08/22 0428          Pressors:    Autonomic Drugs (From admission, onward)                None          Hyperglycemia/Diabetes Medications:   Antihyperglycemics (From admission, onward)                Start     Stop Route Frequency Ordered    06/09/22 2100  insulin detemir U-100 pen 20 Units         -- SubQ 2 times daily 06/09/22 1407    06/09/22 1645  insulin aspart U-100 pen 12 Units         -- SubQ 3 times daily with meals 06/09/22 1407    06/08/22 1459  insulin aspart U-100 pen 1-10 Units         -- SubQ Before meals & nightly PRN 06/08/22 1400

## 2022-06-10 NOTE — PLAN OF CARE
06/10/22 1049   Post-Acute Status   Post-Acute Authorization Home Health   Home Health Status Referrals Sent   Discharge Plan   Discharge Plan A Home Health   Discharge Plan B Home       SW called Home Health agencies for pt and none were in network with insurance     SW then sent referrals via [facility]     Joanne Wilson, EN, MSW, LMSW, RSW   Case Management  [facility]  Email: [email or web address]

## 2022-06-10 NOTE — PLAN OF CARE
[facility] ([place] [address])      HOME HEALTH ORDERS  FACE TO FACE ENCOUNTER    Patient Name: Billy Rivera  YOB: 1965    PCP: BRAD Baker MD   PCP Address: [address] [facility] [place] / [place] [address]  PCP Phone Number: [phone]  PCP Fax: [phone]    Encounter Date: 6/7/22    Admit to Home Health    Diagnoses:  Active Hospital Problems    Diagnosis  POA    *Osteomyelitis of right foot [M86.9]  Yes    CAD (coronary artery disease) [I25.10]  Yes     Chronic    Ulcer of right foot with fat layer exposed [L97.512]  Yes    Type 2 diabetes mellitus with diabetic peripheral angiopathy without gangrene, with long-term current use of insulin [E11.51, Z79.4]  Not Applicable     Chronic    Hypertension associated with diabetes [E11.59, I15.2]  Yes     Chronic    Sleep apnea [G47.30]  Yes     Chronic    Peripheral arterial disease [I73.9]  Yes      Resolved Hospital Problems   No resolved problems to display.       Follow Up Appointments:  No future appointments.    Allergies:  Review of patient's allergies indicates:   Allergen Reactions    Penicillins Other (See Comments)     PCN allergy as a child - was told he went into a coma. Tolerates Cefazolin without adverse reactions    Penicillin     Shellfish containing products      Other reaction(s): Unknown    Vancomycin Itching     Tolerated vancomycin 7/2020    Bactrim  [sulfamethoxazole-trimethoprim] Rash       Medications: Review discharge medications with patient and family and provide education.    Current Facility-Administered Medications   Medication Dose Route Frequency Provider Last Rate Last Admin    aspirin EC tablet 81 mg  81 mg Oral Daily Shama Madrid MD   81 mg at 06/09/22 0951    atorvastatin tablet 80 mg  80 mg Oral QHS Shama Madrid MD   80 mg at 06/09/22 2152    carvediloL tablet 12.5 mg  12.5 mg Oral BID Shama Madrid MD   12.5 mg at 06/09/22 0951    cefepime in dextrose 5 % IVPB 2 g   2 g Intravenous Q8H Martni Rosales MD   Stopped at 06/10/22 0450    clindamycin in D5W 900 mg/50 mL IVPB 900 mg  900 mg Intravenous Q8H Martin Rosales MD   Stopped at 06/10/22 0410    dextrose 10% bolus 125 mL  12.5 g Intravenous PRN Kenneth Hughes DNP, CHETNA        dextrose 10% bolus 250 mL  25 g Intravenous PRN Kenneth Hughes DNP, FNP        glucagon (human recombinant) injection 1 mg  1 mg Intramuscular PRN Kenneth Hughes DNP, FNP        glucose chewable tablet 16 g  16 g Oral PRGEOFF Hughes DNP, FNP        glucose chewable tablet 24 g  24 g Oral PRGEOFF Hughes DNP, FNP        insulin aspart U-100 pen 1-10 Units  1-10 Units Subcutaneous QID (AC + HS) PRGEOFF Hughes DNP, FNP   3 Units at 06/09/22 2154    insulin aspart U-100 pen 12 Units  12 Units Subcutaneous TIDWM Luke Cenac, NP   12 Units at 06/09/22 1818    insulin detemir U-100 pen 20 Units  20 Units Subcutaneous BID Luke Cenac, NP   20 Units at 06/09/22 2153    lisinopriL tablet 40 mg  40 mg Oral Daily Shama Madrid MD   40 mg at 06/09/22 0951    melatonin tablet 6 mg  6 mg Oral Nightly PRN Shama Madrid MD        naloxone 0.4 mg/mL injection 0.02 mg  0.02 mg Intravenous PRN Shama Madrid MD        senna-docusate 8.6-50 mg per tablet 1 tablet  1 tablet Oral BID PRN Shama Madrid MD        sodium chloride 0.9% flush 10 mL  10 mL Intravenous Q12H PRN Shama Madrid MD        vancomycin - pharmacy to dose   Intravenous pharmacy to manage frequency Martin Rosales MD        vancomycin 2 g in dextrose 5 % 500 mL IVPB  2,000 mg Intravenous Q12H Suzy Root MD         Current Discharge Medication List      START taking these medications    Details   doxycycline (VIBRAMYCIN) 100 MG Cap Take 1 capsule (100 mg total) by mouth 2 (two) times daily. for 5 days  Qty: 10 capsule, Refills: 0         CONTINUE these medications which have NOT CHANGED    Details   acetaminophen (TYLENOL) 325 MG tablet Take 325 mg  by mouth every 6 (six) hours as needed for Pain.      ascorbic acid, vitamin C, (VITAMIN C) 250 MG tablet Take 500 mg by mouth once daily.      aspirin (ECOTRIN) 81 MG EC tablet Take 1 tablet (81 mg total) by mouth once daily.  Refills: 0      aspirin/salicylamide/caffeine (BC HEADACHE POWDER ORAL) Take 1 Package by mouth daily as needed (pain).      atorvastatin (LIPITOR) 80 MG tablet Take 1 tablet (80 mg total) by mouth once daily. Take every night.  Qty: 90 tablet, Refills: 3      bismuth subsalicylate (PEPTO-BISMOL) 262 mg/15 mL suspension Take 15 mLs by mouth every other day. (As needed for diarrhea)      carvediloL (COREG) 12.5 MG tablet Take 1 tablet (12.5 mg total) by mouth 2 (two) times daily.  Qty: 180 tablet, Refills: 3    Comments: .      ibuprofen (ADVIL,MOTRIN) 200 MG tablet Take 400-600 mg by mouth daily as needed for Pain.      insulin lispro (HUMALOG U-100 INSULIN) 100 unit/mL injection Inject 100 Units into the skin continuous. Gives via insulin pump only. Do not Directly inject.  Qty: 50 mL, Refills: 11    Comments: Has [employer] voucher to make it $35/month  Associated Diagnoses: Type 2 diabetes mellitus 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, with long-term current use of insulin      lisinopriL (PRINIVIL,ZESTRIL) 40 MG tablet Take 1 tablet by mouth once daily  Qty: 30 tablet, Refills: 0      metFORMIN (GLUCOPHAGE) 1000 MG tablet Take 1 tablet (1,000 mg total) by mouth 2 (two) times daily with meals.  Qty: 180 tablet, Refills: 3    Associated Diagnoses: Type 2 diabetes mellitus with hyperglycemia, with long-term current use of insulin      MINIMED 770G INSULIN PUMP Misc 1 each by Misc.(Non-Drug; Combo Route) route continuous. Medically necessary for management of Type 2 diabetes, E11.65  Qty: 1 each, Refills: 0    Associated Diagnoses: Type 2 diabetes mellitus 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, with  "long-term current use of insulin      multivit-min/folic/vit K/lycop (MEN'S MULTIVITAMIN ORAL) Take 1 tablet by mouth once daily.      blood sugar diagnostic (ACCU-CHEK GUIDE TEST STRIPS) Strp 1 each by Misc.(Non-Drug; Combo Route) route 5 (five) times daily.  Qty: 150 each, Refills: 11    Comments: To use with Medtronic insulin pump  Associated Diagnoses: Uncontrolled type 2 diabetes mellitus with hyperglycemia      flash glucose sensor (FREESTYLE MICHAEL 2 SENSOR) Kit 1 each by Misc.(Non-Drug; Combo Route) route every 14 (fourteen) days.  Qty: 2 kit, Refills: 11      insulin (LANTUS SOLOSTAR U-100 INSULIN) glargine 100 units/mL (3mL) SubQ pen Inject 60 Units into the skin once daily.  Qty: 15 mL, Refills: 0    Associated Diagnoses: Type 2 diabetes mellitus 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, with long-term current use of insulin      lancets (ACCU-CHEK FASTCLIX LANCET DRUM) Misc 1 each by Misc.(Non-Drug; Combo Route) route Daily.  Qty: 30 each, Refills: 11    Comments: 30 drums per month to use with Medtronic insulin pump  Associated Diagnoses: Uncontrolled type 2 diabetes mellitus with hyperglycemia      pen needle, diabetic 31 gauge x 3/16" Ndle Inject 1 each into the skin 3 (three) times daily before meals.  Qty: 100 each, Refills: 12    Associated Diagnoses: Type 2 diabetes mellitus without complication, with long-term current use of insulin               I have seen and examined this patient within the last 30 days. My clinical findings that support the need for the home health skilled services and home bound status are the following:no   Weakness/numbness causing balance and gait disturbance due to Infection making it taxing to leave home.     Diet:   diabetic diet 2000 calorie      Referrals/ Consults  Physical Therapy to evaluate and treat. Evaluate for home safety and equipment needs; Establish/upgrade home exercise program. Perform / instruct on " therapeutic exercises, gait training, transfer training, and Range of Motion.  Occupational Therapy to evaluate and treat. Evaluate home environment for safety and equipment needs. Perform/Instruct on transfers, ADL training, ROM, and therapeutic exercises.    Activities:   activity as tolerated    Nursing:   Agency to admit patient within 24 hours of hospital discharge unless specified on physician order or at patient request    SN to complete comprehensive assessment including routine vital signs. Instruct on disease process and s/s of complications to report to MD. Review/verify medication list sent home with the patient at time of discharge  and instruct patient/caregiver as needed. Frequency may be adjusted depending on start of care date.     Skilled nurse to perform up to 3 visits PRN for symptoms related to diagnosis    Notify MD if SBP > 160 or < 90; DBP > 90 or < 50; HR > 120 or < 50; Temp > 101; O2 < 88%;     Ok to schedule additional visits based on staff availability and patient request on consecutive days within the home health episode.    When multiple disciplines ordered:    Start of Care occurs on Sunday - Wednesday schedule remaining discipline evaluations as ordered on separate consecutive days following the start of care.    Thursday SOC -schedule subsequent evaluations Friday and Monday the following week.     Friday - Saturday SOC - schedule subsequent discipline evaluations on consecutive days starting Monday of the following week.    For all post-discharge communication and subsequent orders please contact patient's primary care physician. If unable to reach primary care physician or do not receive response within 30 minutes, please contact PCP for clinical staff order clarification    Home Health Aide:  Physical Therapy Three times weekly and Occupational Therapy Three times weekly    Wound Care Orders  Home health/facility to do dressing changes to R toe wound every MWF and prn as follows:   Cleanse right foot incision site and plantar foot wound with saline or wound cleanser, paint wounds with betadine, cover with xeroform, 4x4 gauze, cast padding x2, coban in a football dressing     I certify that this patient is confined to his home and needs physical therapy and occupational therapy.

## 2022-06-10 NOTE — PT/OT/SLP PROGRESS
Physical Therapy   Discharge     Billy Sheffield Miguel   MRN: [id number]     PT evaluation orders received and chart reviewed. Patient denied need for PT evaluation. Patient found ambulating within his room with no AD and heel touch weight bearing with post op shoe on. Patient stated that he is packing up his stuff so he can leave. Once PT left the room, patient ambulated down the germain to the drink machine without difficulty. PT to discontinue orders per patient request.     Pinky Huddleston, PT

## 2022-06-10 NOTE — SUBJECTIVE & OBJECTIVE
Subjective:     Interval History:   S/p right partial 5th ray amputation on 6/9/2022. NAEON. Remains AFVSS. No leukocytosis. Intraop cultures pending. Denies NVFC. Denies pain. No other concerns.     Follow-up For: Procedure(s) (LRB):  AMPUTATION, TOE   Post-Operative Day: 1 Day Post-Op    Scheduled Meds:   aspirin  81 mg Oral Daily    atorvastatin  80 mg Oral QHS    carvediloL  12.5 mg Oral BID    ceFEPime (MAXIPIME) IVPB  2 g Intravenous Q8H    clindamycin (CLEOCIN) IVPB  900 mg Intravenous Q8H    insulin aspart U-100  12 Units Subcutaneous TIDWM    insulin detemir U-100  20 Units Subcutaneous BID    lisinopriL  40 mg Oral Daily    vancomycin (VANCOCIN) IVPB  2,000 mg Intravenous Q12H     Continuous Infusions:  PRN Meds:dextrose 10%, dextrose 10%, glucagon (human recombinant), glucose, glucose, insulin aspart U-100, melatonin, naloxone, senna-docusate 8.6-50 mg, sodium chloride 0.9%, Pharmacy to dose Vancomycin consult **AND** vancomycin - pharmacy to dose    Review of Systems   Constitutional:  Negative for activity change, appetite change, chills and fever.   Respiratory:  Negative for cough and shortness of breath.    Cardiovascular:  Negative for leg swelling.   Gastrointestinal:  Negative for nausea and vomiting.   Musculoskeletal:  Negative for arthralgias, gait problem and myalgias.   Skin:  Positive for color change and wound.   Neurological:  Positive for numbness. Negative for weakness.   Psychiatric/Behavioral:  Negative for confusion.    Objective:     Vital Signs (Most Recent):  Temp: 98 °F (36.7 °C) (06/10/22 0818)  Pulse: 61 (06/10/22 0818)  Resp: 16 (06/10/22 0818)  BP: (!) 144/86 (06/10/22 0818)  SpO2: (!) 94 % (06/10/22 0818)   Vital Signs (24h Range):  Temp:  [97.5 °F (36.4 °C)-98.4 °F (36.9 °C)] 98 °F (36.7 °C)  Pulse:  [47-63] 61  Resp:  [16-20] 16  SpO2:  [93 %-100 %] 94 %  BP: (101-144)/(57-86) 144/86     Weight: 122.5 kg (270 lb)  Body mass index is 34.67 kg/m².    Foot  Exam    General  Orientation: alert and oriented to person, place, and time       Right Foot/Ankle     Inspection and Palpation  Tenderness: none   Swelling: (localized edema surrounding 5th ray amputation site)  Skin Exam: cellulitis, skin changes, ulcer and erythema; no drainage, skin not intact and no abnormal color     Neurovascular  Dorsalis pedis: doppler  Posterior tibial: doppler  Saphenous nerve sensation: diminished  Tibial nerve sensation: diminished  Superficial peroneal nerve sensation: diminished  Deep peroneal nerve sensation: diminished  Sural nerve sensation: diminished    Comments  Partial 5th ray amputated    Left Foot/Ankle      Inspection and Palpation  Tenderness: none   Swelling: none   Skin Exam: skin intact; no drainage, no cellulitis, no ulcer and no erythema     Neurovascular  Dorsalis pedis: doppler  Posterior tibial: doppler  Saphenous nerve sensation: diminished  Tibial nerve sensation: diminished  Superficial peroneal nerve sensation: diminished  Deep peroneal nerve sensation: diminished  Sural nerve sensation: diminished    Comments  Partial 1st ray amputated    Laboratory:  CBC:   Recent Labs   Lab 06/10/22  0246   WBC 9.12   RBC 4.20*   HGB 12.5*   HCT 37.6*      MCV 90   MCH 29.8   MCHC 33.2     CRP:   Recent Labs   Lab 06/08/22  0036   CRP 69.6*     ESR:   Recent Labs   Lab 06/08/22  0036   SEDRATE 78*     Wound Cultures:   Recent Labs   Lab 03/16/22  1136 06/08/22  0206 06/08/22  1231 06/09/22  1514   LABAERO ENTEROBACTER CLOACAE  Moderate  * GRAM NEGATIVE VIVIAN  Many  Identification and susceptibility pending  *  STREPTOCOCCUS AGALACTIAE (GROUP B)  Many  Beta-hemolytic streptococci are routinely susceptible to   penicillins,cephalosporins and carbapenems.  Susceptibility testing not routinely performed  * STREPTOCOCCUS AGALACTIAE (GROUP B)  Many  Beta-hemolytic streptococci are routinely susceptible to   penicillins,cephalosporins and carbapenems.  Susceptibility testing  not routinely performed  *  GRAM NEGATIVE VIVIAN  Few  Identification and susceptibility pending  * No growth     Microbiology Results (last 7 days)       Procedure Component Value Units Date/Time    Aerobic culture [[id number]] Collected: 06/09/22 1514    Order Status: Completed Specimen: Bone from Toe, Right Foot Updated: 06/10/22 0826     Aerobic Bacterial Culture No growth    Narrative:      Right 5th metatarsal clean margin    Culture, Anaerobe [[id number]] Collected: 06/09/22 1514    Order Status: Completed Specimen: Bone from Toe, Right Foot Updated: 06/10/22 0715     Anaerobic Culture Culture in progress    Narrative:      Right 5th metatarsal clean margin    Blood culture #1 **CANNOT BE ORDERED STAT** [[id number]] Collected: 06/08/22 0036    Order Status: Completed Specimen: Blood from Peripheral, Antecubital, Left Updated: 06/10/22 0612     Blood Culture, Routine No Growth to date      No Growth to date      No Growth to date    Blood culture #2 **CANNOT BE ORDERED STAT** [[id number]] Collected: 06/08/22 0036    Order Status: Completed Specimen: Blood from Peripheral, Antecubital, Left Updated: 06/10/22 0612     Blood Culture, Routine No Growth to date      No Growth to date      No Growth to date    AFB Culture & Smear [[id number]] Collected: 06/08/22 1231    Order Status: Completed Specimen: Wound from Foot, Right Updated: 06/09/22 2127     AFB Culture & Smear Culture in progress     AFB CULTURE STAIN No acid fast bacilli seen.    Gram stain [[id number]] Collected: 06/09/22 1514    Order Status: Completed Specimen: Bone from Toe, Right Foot Updated: 06/09/22 1628     Gram Stain Result No WBC's      No organisms seen    Narrative:      Right 5th metatarsal clean margin    AFB Culture & Smear [[id number]] Collected: 06/09/22 1514    Order Status: Sent Specimen: Bone from Toe, Right Foot Updated: 06/09/22 1537    Fungus culture [[id number]] Collected: 06/09/22 1514    Order Status: Sent Specimen: Bone from Toe, Right  Foot Updated: 06/09/22 1537    Culture, Anaerobe [[id number]] Collected: 06/08/22 1231    Order Status: Completed Specimen: Wound from Foot, Right Updated: 06/09/22 1127     Anaerobic Culture Culture in progress    Aerobic culture [[id number]]  (Abnormal) Collected: 06/08/22 1231    Order Status: Completed Specimen: Wound from Foot, Right Updated: 06/09/22 0956     Aerobic Bacterial Culture STREPTOCOCCUS AGALACTIAE (GROUP B)  Many  Beta-hemolytic streptococci are routinely susceptible to   penicillins,cephalosporins and carbapenems.  Susceptibility testing not routinely performed        GRAM NEGATIVE VIVIAN  Few  Identification and susceptibility pending      Aerobic culture [[id number]]  (Abnormal) Collected: 06/08/22 0206    Order Status: Completed Specimen: Wound from Toe, Right Foot Updated: 06/09/22 0801     Aerobic Bacterial Culture GRAM NEGATIVE VIVIAN  Many  Identification and susceptibility pending        STREPTOCOCCUS AGALACTIAE (GROUP B)  Many  Beta-hemolytic streptococci are routinely susceptible to   penicillins,cephalosporins and carbapenems.  Susceptibility testing not routinely performed      Gram stain [[id number]] Collected: 06/08/22 1231    Order Status: Completed Specimen: Wound from Foot, Right Updated: 06/08/22 1457     Gram Stain Result Many Gram positive cocci      Moderate No WBC's          Specimen (24h ago, onward)                 Start     Ordered    06/09/22 1515  Specimen to Pathology, Surgery General Surgery  Once        Comments: Pre-op Diagnosis: Foot infection [L08.9]Toe osteomyelitis, right [M86.9]Procedure(s):AMPUTATION, TOE - Dr. Melara @ [place] in am Number of specimens: 2Name of specimens: 1. Right 5th toe - permanent 2. Right 5th metatarsal clean margin - permanent     References:    Click here for ordering Quick Tip   Question Answer Comment   Procedure Type: General Surgery    Which provider would you like to cc? ELMER MELARA    Release to patient Immediate        06/09/22 1515                     Diagnostic Results:  I have reviewed all pertinent imaging results/findings within the past 24 hours.  Imaging Results              US Lower Extrem Arteries Bilat with VASILIY (xpd) (Final result)  Result time 06/08/22 19:26:51      Final result by Aaron Weiner MD (06/08/22 19:26:51)                   Impression:      Ankle-brachial index of 1.1 on the right and 1.2 on the left.    No hemodynamically significant stenosis demonstrated in the right or left lower extremity arterial system.    Electronically signed by resident: Billy العراقي  Date:    06/08/2022  Time:    19:07    Electronically signed by: Aaron Weiner MD  Date:    06/08/2022  Time:    19:26               Narrative:    EXAMINATION:  US ARTERIAL LOWER EXTREMITY BILAT WITH VASILIY (XPD)    CLINICAL HISTORY:  PAD, R foot infection;    TECHNIQUE:  Bilateral lower extremity arterial duplex ultrasound examination performed. Multiple gray scale and color doppler images were obtained in addition to waveform analysis.  Ankle-brachial indices were calculated.    COMPARISON:  Lower extremity ultrasound 07/05/2020.    FINDINGS:  The ankle brachial index on the right is 1.1 and on the left is 1.2.    The peak systolic velocities on the right are as follows, in centimeters/second:    Common femoral artery: 104    Deep femoral artery: 78    Superficial femoral artery, proximal: 150    Superficial femoral artery, mid portion: 131    Superficial femoral artery, distal: 129    Popliteal artery: Proximal popliteal artery: 130; distal popliteal artery: 125    Posterior tibial artery: 122    Anterior tibial artery: 156    The peak systolic velocities on the left are as follows, in centimeters/second:    Common femoral artery: 95    Deep femoral artery: 104    Superficial femoral artery, proximal: 141    Superficial femoral artery, mid portion: 120    Superficial femoral artery, distal: 88    Popliteal artery: Proximal popliteal: 88; distal popliteal:  103    Posterior tibial artery: 96    Anterior tibial artery: 79    Normal triphasic arterial waveforms are demonstrated.                                        MRI Foot (Forefoot) Right Without Contrast (Final result)  Result time 06/08/22 08:53:40      Final result by Diego Corey MD (06/08/22 08:53:40)                   Impression:      Soft tissue defect involving the pinky toe with foci of subcutaneous emphysema, concerning for infection with a gas-forming organism.    Osteomyelitis of the proximal, middle, and distal phalanges in the pinky toe.    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    No abscess, septic arthritis, or tenosynovitis.    This report was flagged in Epic as abnormal.      Electronically signed by: Diego Corey  Date:    06/08/2022  Time:    08:53               Narrative:    EXAMINATION:  MRI FOOT (FOREFOOT) RIGHT WITHOUT CONTRAST    CLINICAL HISTORY:  Osteomyelitis, foot;    TECHNIQUE:  Multisequence multiplanar images of the forefoot, without intravenous contrast.    COMPARISON:  Foot radiographs 06/08/2022; MRI foot 06/01/2018    FINDINGS:  Sequences are degraded by patient motion artifact.  Soft tissue defect along the lateral aspect of the pinky toe, with adjacent skin thickening and subcutaneous edema.  There are numerous hypointense foci in the subcutaneous soft tissues, concerning for subcutaneous emphysema.  There is STIR hyperintense signal with corresponding T1 marrow replacement throughout the proximal, middle, and distal phalanges, with erosive changes at the PIP joint, compatible with osteomyelitis.  The 5th metatarsal is spared.    Additional soft tissue defect in the plantar forefoot overlying the 2nd MTP joint.  There is STIR hyperintense signal in the head of the 2nd metatarsal and bases of the 2nd and 3rd proximal phalanges, likely reactive.    There is endosteal edema within the 4th metatarsal shaft, visible on both T2 and T1 weighted  sequences.  No cortical signal changes or discrete fracture lines.  No osteonecrosis.  Partially imaged degenerative changes in the midfoot with areas of subchondral edema.    No significant joint effusion or synovitis.    No specific evidence of tenosynovitis.    There is extensive subcutaneous edema.  No organized fluid collections.  Diffuse muscle atrophy with corresponding STIR hyperintense signal, likely reflecting denervation however myositis could have a similar appearance.                                        X-Ray Foot Complete Right (Final result)  Result time 06/08/22 03:02:11      Final result by Mingo Adan MD (06/08/22 03:02:11)                   Impression:      Abnormal appearance of the soft tissues of the 5th digit most concerning for infectious process, including air density within the soft tissues.    Abnormality involving the lateral aspect of the distal portion of the proximal phalanx of the 5th digit, most concerning for osseous infectious involvement, osteomyelitis for which clinical and historical correlation is needed.    This report was flagged in Epic as abnormal.      Electronically signed by: Mingo Adan  Date:    06/08/2022  Time:    03:02               Narrative:    EXAMINATION:  XR FOOT COMPLETE 3 VIEW RIGHT    CLINICAL HISTORY:  . Local infection of the skin and subcutaneous tissue, unspecified    TECHNIQUE:  AP, lateral, and oblique views of the right foot were performed.    COMPARISON:  Radiograph right foot June 7, 2018    FINDINGS:  There is abnormal appearance of the soft tissues of the 5th digit of the right foot, irregular appearance of the soft tissues as well as appearance of air density within the soft tissues, most concerning for infectious process given the history.  In addition there is abnormal appearance involving the lateral aspect of the distal portion of the proximal phalanx of the 5th digit, the appearance of which is most concerning for osseous  infectious involvement.    The remainder of the visualized osseous structures appear intact, chronic appearing changes are noted there is no additional evidence for acute fracture or dislocation.  Soft tissue densities may relate to soft tissue calcifications and appears similar to the prior study.                                        Clinical Findings:  S/p partial 5th ray amputation on 6/9/2022. Sutures intact, skin well coapted, localized erythema and edema surrounding surgical site. Mild necrosis along central skin margins. Plantar 2nd MTPJ ulceration with granular wound base, no probe to bone, no erythema, no drainage. Down to level of subcutaneous tissue.

## 2022-06-10 NOTE — PT/OT/SLP PROGRESS
Occupational Therapy      Patient Name:  Billy Sheffield Miguel   MRN:  [id number]    Patient found this date mobilizing within room wearing RLE heel offloading boot.  Pt very pleasant and politely declining therapy evaluation today. Pt states previous toe amputations has educated pt on procedure and safe mobility. Pt adhering to heel WB. Pt screen & dc today.    Rhoda Randolph OT  6/10/2022

## 2022-06-10 NOTE — ASSESSMENT & PLAN NOTE
Ankle-brachial index of 1.1 on the right and 1.2 on the left.     No hemodynamically significant stenosis demonstrated in the right or left lower extremity arterial system.    Patient should have adequate perfusion to heal amputation site.

## 2022-06-10 NOTE — PLAN OF CARE
[facility] - Intensive Care ([facility]  Initial Discharge Assessment       Primary Care Provider: BRAD Baker MD    Admission Diagnosis: Preop cardiovascular exam [Z01.810]  Foot infection [L08.9]  Toe osteomyelitis, right [M86.9]  Chest pain [R07.9]  Ulcer of right foot with fat layer exposed [L97.512]  Sleep apnea, unspecified type [G47.30]  Type 2 diabetes mellitus with diabetic peripheral angiopathy without gangrene, with long-term current use of insulin [E11.51, Z79.4]  Other acute osteomyelitis of right foot [M86.171]    Admission Date: 6/7/2022  Expected Discharge Date: 6/13/2022    SW spoke with pt at bedside on 06/09.  Pt stated he was getting some toes amputated either on the afternoon of 06/09 or 06/10.  Pt stated he had toes amputated previously and knew what to expect and also knew what he would need to do to recover.    Discharge Barriers Identified: (P) None    Payor: [employer] / Plan: [employer] ([employer]) / Product Type: Commercial /     Extended Emergency Contact Information  Primary Emergency Contact: Toña Rivera  Address: [address] [place] [address]  Home Phone: [phone]  Relation: Spouse    Discharge Plan A: (P) Home  Discharge Plan B: (P) Home Health      [facility] [address] [place] [address] [address] [facility]  [address] [facility] [address]  Phone: [phone] Fax: [phone]    [facility] [address]  [facility] [address]  Phone: [phone] Fax: [phone]    [facility]  ([facility]) - [place] [id number], [address]  [address] [facility] [address]  [facility] [address]  Phone: [phone] Fax: [phone]    [id number] [place] - [address] [facility]  [address] [facility]  [place] [address]  Phone: [phone] Fax: [phone]      Initial Assessment (most recent)       Adult Discharge Assessment - 06/10/22 0800          Discharge  Assessment    Assessment Type Discharge Planning Assessment (P)      Confirmed/corrected address, phone number and insurance Yes (P)      Confirmed Demographics Correct on Facesheet (P)      Source of Information patient (P)      Communicated DARVIN with patient/caregiver Date not available/Unable to determine (P)      Reason For Admission Osteomyelitis of right foot (P)      Lives With spouse (P)      Do you expect to return to your current living situation? Yes (P)      Prior to hospitilization cognitive status: Alert/Oriented;No Deficits (P)      Current cognitive status: Alert/Oriented;No Deficits (P)      Walking or Climbing Stairs Difficulty none (P)      Dressing/Bathing Difficulty none (P)      Home Accessibility stairs to enter home (P)      Number of Stairs, Main Entrance three (P)      Stair Railings, Main Entrance railings safe and in good condition (P)      Home Layout Able to live on 1st floor (P)      Equipment Currently Used at Home none (P)      Do you have any problems affording any of your prescribed medications? No (P)      Is the patient taking medications as prescribed? yes (P)      Who is going to help you get home at discharge? Spouse - Toña (P)      How do you get to doctors appointments? car, drives self;family or friend will provide (P)      Are you on dialysis? No (P)      Do you take coumadin? No (P)      Discharge Plan A Home (P)      Discharge Plan B Home Health (P)      DME Needed Upon Discharge  none (P)      Discharge Plan discussed with: Patient (P)      Discharge Barriers Identified None (P)         Relationship/Environment    Name(s) of Who Lives With Patient Spouse Toña - [phone] (P)                           Joanne Wilson CD, MSW, LMSW, RSW   Case Management  [facility]  Email: [email or web address]

## 2022-06-10 NOTE — ASSESSMENT & PLAN NOTE
Endocrinology consulted for BG management.   BG goal 140-180    - Discontinue MDI insulin regimen.   - Start  transition IV insulin infusion with stepdown parameters. Initial rate starting at 2 units/hr. 0.8 u/kg/d dosing.   - Increase mealtime Novolog to 16 units TIDWM.   - Moderate Dose SQ Insulin Correction Scale.  - BG Monitoring /HS/0200    ** Please notify Endocrine for any change and/or advance in diet**  ** Please call Endocrine for any BG related issues **    Discharge Planning:   TBD. Please notify endocrinology prior to discharge. Will most likely have patient continue home insulin pump, and follow-up with primary endo provider for DM follow-up and maintenance.

## 2022-06-10 NOTE — ASSESSMENT & PLAN NOTE
56 year old man with poorly controlled T2DM, HTN, CAD here for right 5th digit wound first noticed a few days prior to presentation. VSS, no leukocytosis. Initial ESR 78, CRP 70. Received cefepime in ED. Notified by Radiology that MRI shows osteomyelitis of the digit without abscess. Discussed finding with patient, who is open to amputation if necessary.  - imaging concerning for nec fasciitis    Plan  - Podiatry consulted, patient to go for amputation of R toe on 6/9  - NPO and hold DVT ppx for now  - f/u blood cx x2 and toe cx  - discharged with 5 days of doxycycline

## 2022-06-10 NOTE — ANESTHESIA POSTPROCEDURE EVALUATION
Anesthesia Post Evaluation    Patient: Billy Hammmoberta Miguel    Procedure(s) Performed: Procedure(s) (LRB):  AMPUTATION, TOE - Dr. Melara @ [place] in am (Right)    Final Anesthesia Type: general      Patient location during evaluation: PACU  Patient participation: Yes- Able to Participate  Level of consciousness: awake and alert  Post-procedure vital signs: reviewed and stable  Pain management: adequate  Airway patency: patent    PONV status at discharge: No PONV  Anesthetic complications: no      Cardiovascular status: blood pressure returned to baseline  Respiratory status: unassisted  Hydration status: euvolemic  Follow-up not needed.          Vitals Value Taken Time   /68 06/09/22 1617   Temp 36.4 °C (97.5 °F) 06/09/22 1530   Pulse 48 06/09/22 1619   Resp 26 06/09/22 1600   SpO2 100 % 06/09/22 1619   Vitals shown include unvalidated device data.      No case tracking events are documented in the log.      Pain/Gabriel Score: Gabriel Score: 10 (6/9/2022  3:30 PM)

## 2022-06-10 NOTE — PROGRESS NOTES
Pharmacokinetic Assessment Follow Up: IV Vancomycin    Vancomycin serum concentration assessment(s):    The trough level was drawn correctly and can be used to guide therapy at this time. The measurement is below the desired definitive target range of 15 to 20 mcg/mL.    Vancomycin Regimen Plan:    Change regimen to Vancomycin 2000 mg IV every 12 hours with next serum trough concentration measured at 1000 on 6/11    Drug levels (last 3 results):  Recent Labs   Lab Result Units 06/09/22  2340   Vancomycin-Trough ug/mL 12.6       Pharmacy will continue to follow and monitor vancomycin.    Please contact pharmacy at extension [phone] for questions regarding this assessment.    Thank you for the consult,   Helen Alexis       Patient brief summary:  Billy Rivera is a 57 y.o. male initiated on antimicrobial therapy with IV Vancomycin for treatment of bone/joint infection      Drug Allergies:   Review of patient's allergies indicates:   Allergen Reactions    Penicillins Other (See Comments)     PCN allergy as a child - was told he went into a coma. Tolerates Cefazolin without adverse reactions    Penicillin     Shellfish containing products      Other reaction(s): Unknown    Vancomycin Itching     Tolerated vancomycin 7/2020    Bactrim  [sulfamethoxazole-trimethoprim] Rash       Actual Body Weight:   127 kg    Renal Function:   Estimated Creatinine Clearance: 114 mL/min (based on SCr of 1 mg/dL).     Dialysis Method (if applicable):  N/A    CBC (last 72 hours):  Recent Labs   Lab Result Units 06/08/22  0036 06/09/22 0447   WBC K/uL 9.73 9.30   Hemoglobin g/dL 13.6* 13.0*   Hemoglobin A1C % 10.0*  --    Hematocrit % 38.9* 38.6*   Platelets K/uL 277 259   Gran % % 70.4 73.1*   Lymph % % 16.2* 13.2*   Mono % % 9.8 10.2   Eosinophil % % 2.5 2.3   Basophil % % 0.4 0.6   Differential Method  Automated Automated       Metabolic Panel (last 72 hours):  Recent Labs   Lab Result Units 06/08/22 0036 06/09/22  0447    Sodium mmol/L 129* 133*   Potassium mmol/L 4.7 4.8   Chloride mmol/L 97 100   CO2 mmol/L 20* 24   Glucose mg/dL 415* 287*   BUN mg/dL 26* 18   Creatinine mg/dL 1.3 1.0   Albumin g/dL 2.9* 2.7*   Total Bilirubin mg/dL 0.5 0.6   Alkaline Phosphatase U/L 91 93   AST U/L 17 14   ALT U/L 19 14   Magnesium mg/dL  --  1.8   Phosphorus mg/dL  --  3.0       Vancomycin Administrations:  vancomycin given in the last 96 hours                   vancomycin 1.75 g in 5 % dextrose 500 mL IVPB (mg) 1,750 mg New Bag 06/09/22 1238     1,750 mg New Bag  0015    vancomycin (VANCOCIN) 2,500 mg in dextrose 5 % 500 mL IVPB (mg) 2,500 mg New Bag 06/08/22 1239                Microbiologic Results:  Microbiology Results (last 7 days)     Procedure Component Value Units Date/Time    AFB Culture & Smear [[id number]] Collected: 06/08/22 1231    Order Status: Completed Specimen: Wound from Foot, Right Updated: 06/09/22 2127     AFB Culture & Smear Culture in progress     AFB CULTURE STAIN No acid fast bacilli seen.    Gram stain [[id number]] Collected: 06/09/22 1514    Order Status: Completed Specimen: Bone from Toe, Right Foot Updated: 06/09/22 1628     Gram Stain Result No WBC's      No organisms seen    Narrative:      Right 5th metatarsal clean margin    Culture, Anaerobe [[id number]] Collected: 06/09/22 1514    Order Status: Sent Specimen: Bone from Toe, Right Foot Updated: 06/09/22 1538    Aerobic culture [[id number]] Collected: 06/09/22 1514    Order Status: Sent Specimen: Bone from Toe, Right Foot Updated: 06/09/22 1538    AFB Culture & Smear [[id number]] Collected: 06/09/22 1514    Order Status: Sent Specimen: Bone from Toe, Right Foot Updated: 06/09/22 1537    Fungus culture [[id number]] Collected: 06/09/22 1514    Order Status: Sent Specimen: Bone from Toe, Right Foot Updated: 06/09/22 1537    Culture, Anaerobe [[id number]] Collected: 06/08/22 1231    Order Status: Completed Specimen: Wound from Foot, Right Updated: 06/09/22 1129      Anaerobic Culture Culture in progress    Aerobic culture [[id number]]  (Abnormal) Collected: 06/08/22 1231    Order Status: Completed Specimen: Wound from Foot, Right Updated: 06/09/22 0956     Aerobic Bacterial Culture STREPTOCOCCUS AGALACTIAE (GROUP B)  Many  Beta-hemolytic streptococci are routinely susceptible to   penicillins,cephalosporins and carbapenems.  Susceptibility testing not routinely performed        GRAM NEGATIVE VIVIAN  Few  Identification and susceptibility pending      Aerobic culture [[id number]]  (Abnormal) Collected: 06/08/22 0206    Order Status: Completed Specimen: Wound from Toe, Right Foot Updated: 06/09/22 0801     Aerobic Bacterial Culture GRAM NEGATIVE VIVIAN  Many  Identification and susceptibility pending        STREPTOCOCCUS AGALACTIAE (GROUP B)  Many  Beta-hemolytic streptococci are routinely susceptible to   penicillins,cephalosporins and carbapenems.  Susceptibility testing not routinely performed      Blood culture #1 **CANNOT BE ORDERED STAT** [[id number]] Collected: 06/08/22 0036    Order Status: Completed Specimen: Blood from Peripheral, Antecubital, Left Updated: 06/09/22 0613     Blood Culture, Routine No Growth to date      No Growth to date    Blood culture #2 **CANNOT BE ORDERED STAT** [[id number]] Collected: 06/08/22 0036    Order Status: Completed Specimen: Blood from Peripheral, Antecubital, Left Updated: 06/09/22 0613     Blood Culture, Routine No Growth to date      No Growth to date    Gram stain [[id number]] Collected: 06/08/22 1231    Order Status: Completed Specimen: Wound from Foot, Right Updated: 06/08/22 1457     Gram Stain Result Many Gram positive cocci      Moderate No WBC's

## 2022-06-10 NOTE — PLAN OF CARE
Pt is discharged. Vs stable. Pt denied pain and discomfort. D/c instruction explained and given to pt and pt verbalized understanding. Pt stated he will dry himself home. Pt left unit with transport via wheelchair to his car.

## 2022-06-11 LAB — BACTERIA SPEC AEROBE CULT: ABNORMAL

## 2022-06-13 ENCOUNTER — TELEPHONE (OUTPATIENT)
Dept: PODIATRY | Facility: CLINIC | Age: 57
End: 2022-06-13
Payer: COMMERCIAL

## 2022-06-13 LAB
BACTERIA BLD CULT: NORMAL
BACTERIA BLD CULT: NORMAL
BACTERIA SPEC ANAEROBE CULT: ABNORMAL
BACTERIA SPEC ANAEROBE CULT: ABNORMAL

## 2022-06-15 ENCOUNTER — OFFICE VISIT (OUTPATIENT)
Dept: INTERNAL MEDICINE | Facility: CLINIC | Age: 57
End: 2022-06-15
Payer: COMMERCIAL

## 2022-06-15 VITALS
SYSTOLIC BLOOD PRESSURE: 114 MMHG | WEIGHT: 277.88 LBS | OXYGEN SATURATION: 97 % | BODY MASS INDEX: 35.66 KG/M2 | DIASTOLIC BLOOD PRESSURE: 68 MMHG | HEART RATE: 60 BPM | HEIGHT: 74 IN | TEMPERATURE: 98 F | RESPIRATION RATE: 18 BRPM

## 2022-06-15 DIAGNOSIS — E11.51 TYPE 2 DIABETES MELLITUS WITH DIABETIC PERIPHERAL ANGIOPATHY WITHOUT GANGRENE, WITH LONG-TERM CURRENT USE OF INSULIN: Chronic | ICD-10-CM

## 2022-06-15 DIAGNOSIS — Z96.41 INSULIN PUMP IN PLACE: ICD-10-CM

## 2022-06-15 DIAGNOSIS — I15.2 HYPERTENSION ASSOCIATED WITH DIABETES: Chronic | ICD-10-CM

## 2022-06-15 DIAGNOSIS — Z79.4 TYPE 2 DIABETES MELLITUS WITH DIABETIC PERIPHERAL ANGIOPATHY WITHOUT GANGRENE, WITH LONG-TERM CURRENT USE OF INSULIN: Chronic | ICD-10-CM

## 2022-06-15 DIAGNOSIS — E11.59 HYPERTENSION ASSOCIATED WITH DIABETES: Chronic | ICD-10-CM

## 2022-06-15 DIAGNOSIS — S98.132A AMPUTATION OF TOE OF LEFT FOOT: Primary | ICD-10-CM

## 2022-06-15 DIAGNOSIS — E11.65 UNCONTROLLED TYPE 2 DIABETES MELLITUS WITH HYPERGLYCEMIA: ICD-10-CM

## 2022-06-15 PROCEDURE — 99999 PR PBB SHADOW E&M-EST. PATIENT-LVL V: CPT | Mod: PBBFAC,,, | Performed by: STUDENT IN AN ORGANIZED HEALTH CARE EDUCATION/TRAINING PROGRAM

## 2022-06-15 PROCEDURE — 99214 OFFICE O/P EST MOD 30 MIN: CPT | Mod: GC,S$GLB,, | Performed by: STUDENT IN AN ORGANIZED HEALTH CARE EDUCATION/TRAINING PROGRAM

## 2022-06-15 PROCEDURE — 99214 PR OFFICE/OUTPT VISIT, EST, LEVL IV, 30-39 MIN: ICD-10-PCS | Mod: GC,S$GLB,, | Performed by: STUDENT IN AN ORGANIZED HEALTH CARE EDUCATION/TRAINING PROGRAM

## 2022-06-15 PROCEDURE — 99999 PR PBB SHADOW E&M-EST. PATIENT-LVL V: ICD-10-PCS | Mod: PBBFAC,,, | Performed by: STUDENT IN AN ORGANIZED HEALTH CARE EDUCATION/TRAINING PROGRAM

## 2022-06-15 NOTE — PROGRESS NOTES
"CC: Hosp follow up        57 y.o. male patient with uncontrolled T2DM, IDDM, HTN, CAD, Obesity presents in f/u to hospitalization       Admitted: 6/7/2022       D/C: 6/10/2022    Family and/or Caretaker present at visit?  Yes, wife   Diagnostic tests reviewed/disposition: Yes   Disease/illness education: Yes   Home health/community services discussion/referrals: Has HH, is planning to follow up with Andre, NP for diabetes management     Establishment or re-establishment of referral orders for community resources:    Discussion with other health care providers:  N/A             Dx: 5th digit osteomyelitis vs necrotizing fasc s/p amputation on 6/9 with podiatry. Uncontrolled diabetes on insulin pump; endocrine was consulted inpatient       Tx: s/p amputation on 6/9 with podiatry; sent home with 5 days of doxycycline . To F/u with podiatry in 6/20/22       Disposition:        Meds:     Medication List          Accurate as of Gloria 15, 2022  2:13 PM. If you have any questions, ask your nurse or doctor.            CHANGE how you take these medications    carvediloL 12.5 MG tablet  Commonly known as: COREG  Take 1 tablet (12.5 mg total) by mouth 2 (two) times daily.  What changed: when to take this     pen needle, diabetic 31 gauge x 3/16" Ndle  Inject 1 each into the skin 3 (three) times daily before meals.  What changed: when to take this        CONTINUE taking these medications    ACCU-CHEK GUIDE TEST STRIPS Strp  Generic drug: blood sugar diagnostic  1 each by Misc.(Non-Drug; Combo Route) route 5 (five) times daily.     acetaminophen 325 MG tablet  Commonly known as: TYLENOL     ascorbic acid (vitamin C) 250 MG tablet  Commonly known as: VITAMIN C     aspirin 81 MG EC tablet  Commonly known as: ECOTRIN  Take 1 tablet (81 mg total) by mouth once daily.     atorvastatin 80 MG tablet  Commonly known as: LIPITOR  Take 1 tablet (80 mg total) by mouth once daily. Take every night.     BC HEADACHE POWDER ORAL     bismuth " subsalicylate 262 mg/15 mL suspension  Commonly known as: PEPTO BISMOL     FREESTYLE MICHAEL 2 SENSOR Kit  Generic drug: flash glucose sensor  1 each by Misc.(Non-Drug; Combo Route) route every 14 (fourteen) days.     HumaLOG U-100 Insulin 100 unit/mL injection  Generic drug: insulin lispro  Inject 100 Units into the skin continuous. Gives via insulin pump only. Do not Directly inject.     ibuprofen 200 MG tablet  Commonly known as: ADVIL,MOTRIN     lancets Misc  Commonly known as: ACCU-CHEK FASTCLIX LANCET DRUM  1 each by Misc.(Non-Drug; Combo Route) route Daily.     LANTUS SOLOSTAR U-100 INSULIN glargine 100 units/mL (3mL) SubQ pen  Generic drug: insulin  Inject 60 Units into the skin once daily.     lisinopriL 40 MG tablet  Commonly known as: PRINIVILZESTRIL  Take 1 tablet by mouth once daily     MEN'S MULTIVITAMIN ORAL     metFORMIN 1000 MG tablet  Commonly known as: GLUCOPHAGE  Take 1 tablet (1,000 mg total) by mouth 2 (two) times daily with meals.     MINIMED 770G INSULIN PUMP Misc  Generic drug: subcutaneous insulin pump  1 each by Misc.(Non-Drug; Combo Route) route continuous. Medically necessary for management of Type 2 diabetes, E11.65        STOP taking these medications    doxycycline 100 MG Cap  Commonly known as: VIBRAMYCIN  Stopped by: Sheri Lee DO                  Lab: Reviewed         Imaging: Reviewed        Follow up visits: Podiatry on 6/20/2022        Since discharge: Doing well. Tolerating ambulation. Has HH that would come and clean out wound every MWF. Completed 5 days course of doxycycline today. Denies any significant discharges, fever, chills or pain. Denies any hx of neuropathy. Agreed that his DM is uncontrolled and is willing to talk to Andre NP for further management. Was tolerating trulicity and had some improvement. However, unable to afford it. Recent A1c was 10       Sx: Pt denies chest pain, sob, cough, sore throat, abdominal pain, n/v/d, constipation, fever, chills, headache,  numbness or tingling or weakness of the extremities     Review of Systems   Constitutional: Negative for chills, fever, malaise/fatigue and weight loss.   HENT: Negative for congestion and hearing loss.    Eyes: Negative for photophobia.   Respiratory: Negative for cough, sputum production and shortness of breath.    Cardiovascular: Negative for chest pain, palpitations and leg swelling.   Gastrointestinal: Negative for abdominal pain, constipation and diarrhea.   Genitourinary: Negative for hematuria and urgency.   Musculoskeletal: Negative for myalgias.   Neurological: Negative for weakness and headaches.              AD's:         Remainder of review negative except as previously noted    PMHX: Reviewed  PSHX: Reviewed  FHX:    Reviewed  SHX:    Reviewed    Physical Exam  Constitutional:       Appearance: He is obese.   HENT:      Head: Normocephalic and atraumatic.   Eyes:      General:         Right eye: No discharge.         Left eye: No discharge.   Cardiovascular:      Rate and Rhythm: Normal rate and regular rhythm.      Heart sounds: Normal heart sounds.   Pulmonary:      Effort: Pulmonary effort is normal.      Breath sounds: Normal breath sounds.   Abdominal:      General: Bowel sounds are normal.      Palpations: Abdomen is soft.      Tenderness: There is no abdominal tenderness. There is no rebound.   Musculoskeletal:         General: No deformity.      Cervical back: Normal range of motion and neck supple.      Left lower leg: No edema.      Comments: Right foot with bandages, and boot in placed. Had wound care earlier in the day.    Skin:     General: Skin is warm and dry.      Capillary Refill: Capillary refill takes less than 2 seconds.   Neurological:      Mental Status: He is alert and oriented to person, place, and time.   Psychiatric:         Mood and Affect: Affect normal.         Judgment: Judgment normal.           IMPRESSION:    PLAN:  Post Hospital D/C Medications have been  "reconciled    Amputation of toe of left foot  - Continue with HH/wound care   - Podiatry f/u on 6/20/2022  - Completed 5 days course of doxycycline     Uncontrolled type 2 diabetes mellitus with hyperglycemia  - Last HbA1c:   Lab Results   Component Value Date    HGBA1C 10.0 (H) 06/08/2022   - Continue with current medical management: metformin 1000 mg BID, insulin pump   - Pt states that he cannot afford trulicity, currently has an insulin pump  - Pt is advised to have close f/u with Akua Powell NP for further diabetes management. Pt states that he will make an appointment with NP   - Contacted Akua Powell NP and states that she will try to get patient assistance in regards to trulicity costs.     Insulin pump in place  See above     Type 2 diabetes mellitus with diabetic peripheral angiopathy without gangrene, with long-term current use of insulin  See Uncontrolled T2DM with hyperglycemia     Hypertension associated with diabetes  Hypertension:  - BP today: /68 (BP Location: Right arm, Patient Position: Sitting, BP Method: Large (Manual))   Pulse 60   Temp 97.7 °F (36.5 °C) (Temporal)   Resp 18   Ht 6' 2" (1.88 m)   Wt 126.1 kg (277 lb 14.4 oz)   SpO2 97%   BMI 35.68 kg/m²   - HTN well controlled, pt advised to continue current management of  Coreg 12.5 mg QD, lisinopril 40 mg QD  - Pt advised to be compliant with their treatment regimen daily to avoid BP fluctuation and any complications.   - Pt advised to monitor their blood pressure regularly and bring their recorded results to next office visit.  - Pt educated that it is important to eat right. Following a reasonable-calorie low-salt diet (Such as the DASH diet) has been shown to control blood pressure better with less medications. Recommended to exercise at least 30 mins a day for 5 days a week and also told to avoid smoking all together.    Sheri Lee D.O  Internal Medicine PGY-2  [facility]     "

## 2022-06-16 ENCOUNTER — TELEPHONE (OUTPATIENT)
Dept: INTERNAL MEDICINE | Facility: CLINIC | Age: 57
End: 2022-06-16
Payer: COMMERCIAL

## 2022-06-16 ENCOUNTER — TELEPHONE (OUTPATIENT)
Dept: PHARMACY | Facility: CLINIC | Age: 57
End: 2022-06-16
Payer: COMMERCIAL

## 2022-06-16 DIAGNOSIS — E11.51 TYPE 2 DIABETES MELLITUS WITH DIABETIC PERIPHERAL ANGIOPATHY WITHOUT GANGRENE, WITH LONG-TERM CURRENT USE OF INSULIN: Primary | ICD-10-CM

## 2022-06-16 DIAGNOSIS — Z79.4 TYPE 2 DIABETES MELLITUS WITH DIABETIC PERIPHERAL ANGIOPATHY WITHOUT GANGRENE, WITH LONG-TERM CURRENT USE OF INSULIN: Primary | ICD-10-CM

## 2022-06-16 RX ORDER — DULAGLUTIDE 1.5 MG/.5ML
1.5 INJECTION, SOLUTION SUBCUTANEOUS
Qty: 4 PEN | Refills: 11 | Status: SHIPPED | OUTPATIENT
Start: 2022-06-16 | End: 2022-11-30

## 2022-06-16 NOTE — TELEPHONE ENCOUNTER
Spoke with the patient about Trulicity.  The patient has private insurance so the only assistance we can provide is the copay card.  The patient stated he picked up the medication and he is fine with it being 53.00 instead of 700.00.  No assistance needed at this time.

## 2022-06-16 NOTE — TELEPHONE ENCOUNTER
----- Message from Lorenza Miguel sent at 6/15/2022  5:48 PM CDT -----  Good evening Akua,    Please update patient Trulicity prescription in Epic.  I will assist patient with all diabete medications.  If you have any questions please feel free to contact me at [phone].    Thank you,  Lorenza Miguel  Pharmacy Patient Assistance

## 2022-06-17 LAB
FINAL PATHOLOGIC DIAGNOSIS: NORMAL
Lab: NORMAL

## 2022-06-20 ENCOUNTER — OFFICE VISIT (OUTPATIENT)
Dept: PODIATRY | Facility: CLINIC | Age: 57
End: 2022-06-20
Payer: COMMERCIAL

## 2022-06-20 VITALS
HEIGHT: 74 IN | HEART RATE: 58 BPM | WEIGHT: 278 LBS | SYSTOLIC BLOOD PRESSURE: 95 MMHG | BODY MASS INDEX: 35.68 KG/M2 | DIASTOLIC BLOOD PRESSURE: 59 MMHG

## 2022-06-20 DIAGNOSIS — L97.512 ULCER OF RIGHT FOOT WITH FAT LAYER EXPOSED: Primary | ICD-10-CM

## 2022-06-20 DIAGNOSIS — E11.49 TYPE II DIABETES MELLITUS WITH NEUROLOGICAL MANIFESTATIONS: ICD-10-CM

## 2022-06-20 PROCEDURE — 3078F DIAST BP <80 MM HG: CPT | Mod: CPTII,S$GLB,, | Performed by: PODIATRIST

## 2022-06-20 PROCEDURE — 3046F HEMOGLOBIN A1C LEVEL >9.0%: CPT | Mod: CPTII,S$GLB,, | Performed by: PODIATRIST

## 2022-06-20 PROCEDURE — 3046F PR MOST RECENT HEMOGLOBIN A1C LEVEL > 9.0%: ICD-10-PCS | Mod: CPTII,S$GLB,, | Performed by: PODIATRIST

## 2022-06-20 PROCEDURE — 3066F NEPHROPATHY DOC TX: CPT | Mod: CPTII,S$GLB,, | Performed by: PODIATRIST

## 2022-06-20 PROCEDURE — 1111F DSCHRG MED/CURRENT MED MERGE: CPT | Mod: CPTII,S$GLB,, | Performed by: PODIATRIST

## 2022-06-20 PROCEDURE — 1159F PR MEDICATION LIST DOCUMENTED IN MEDICAL RECORD: ICD-10-PCS | Mod: CPTII,S$GLB,, | Performed by: PODIATRIST

## 2022-06-20 PROCEDURE — 3066F PR DOCUMENTATION OF TREATMENT FOR NEPHROPATHY: ICD-10-PCS | Mod: CPTII,S$GLB,, | Performed by: PODIATRIST

## 2022-06-20 PROCEDURE — 1111F PR DISCHARGE MEDS RECONCILED W/ CURRENT OUTPATIENT MED LIST: ICD-10-PCS | Mod: CPTII,S$GLB,, | Performed by: PODIATRIST

## 2022-06-20 PROCEDURE — 3074F PR MOST RECENT SYSTOLIC BLOOD PRESSURE < 130 MM HG: ICD-10-PCS | Mod: CPTII,S$GLB,, | Performed by: PODIATRIST

## 2022-06-20 PROCEDURE — 3074F SYST BP LT 130 MM HG: CPT | Mod: CPTII,S$GLB,, | Performed by: PODIATRIST

## 2022-06-20 PROCEDURE — 3062F PR POS MACROALBUMINURIA RESULT DOCUMENTED/REVIEW: ICD-10-PCS | Mod: CPTII,S$GLB,, | Performed by: PODIATRIST

## 2022-06-20 PROCEDURE — 4010F PR ACE/ARB THEARPY RXD/TAKEN: ICD-10-PCS | Mod: CPTII,S$GLB,, | Performed by: PODIATRIST

## 2022-06-20 PROCEDURE — 3008F PR BODY MASS INDEX (BMI) DOCUMENTED: ICD-10-PCS | Mod: CPTII,S$GLB,, | Performed by: PODIATRIST

## 2022-06-20 PROCEDURE — 3078F PR MOST RECENT DIASTOLIC BLOOD PRESSURE < 80 MM HG: ICD-10-PCS | Mod: CPTII,S$GLB,, | Performed by: PODIATRIST

## 2022-06-20 PROCEDURE — 99024 PR POST-OP FOLLOW-UP VISIT: ICD-10-PCS | Mod: S$GLB,,, | Performed by: PODIATRIST

## 2022-06-20 PROCEDURE — 11042 DBRDMT SUBQ TIS 1ST 20SQCM/<: CPT | Mod: 79,S$GLB,, | Performed by: PODIATRIST

## 2022-06-20 PROCEDURE — 99024 POSTOP FOLLOW-UP VISIT: CPT | Mod: S$GLB,,, | Performed by: PODIATRIST

## 2022-06-20 PROCEDURE — 3008F BODY MASS INDEX DOCD: CPT | Mod: CPTII,S$GLB,, | Performed by: PODIATRIST

## 2022-06-20 PROCEDURE — 99999 PR PBB SHADOW E&M-EST. PATIENT-LVL III: ICD-10-PCS | Mod: PBBFAC,,, | Performed by: PODIATRIST

## 2022-06-20 PROCEDURE — 4010F ACE/ARB THERAPY RXD/TAKEN: CPT | Mod: CPTII,S$GLB,, | Performed by: PODIATRIST

## 2022-06-20 PROCEDURE — 3062F POS MACROALBUMINURIA REV: CPT | Mod: CPTII,S$GLB,, | Performed by: PODIATRIST

## 2022-06-20 PROCEDURE — 99999 PR PBB SHADOW E&M-EST. PATIENT-LVL III: CPT | Mod: PBBFAC,,, | Performed by: PODIATRIST

## 2022-06-20 PROCEDURE — 11042 PR DEBRIDEMENT, SKIN, SUB-Q TISSUE,=<20 SQ CM: ICD-10-PCS | Mod: 79,S$GLB,, | Performed by: PODIATRIST

## 2022-06-20 PROCEDURE — 1159F MED LIST DOCD IN RCRD: CPT | Mod: CPTII,S$GLB,, | Performed by: PODIATRIST

## 2022-06-20 NOTE — LETTER
June 20, 2022      [id number]  [address]  [facility] [address]  Phone: [phone]       Patient: Billy Rivera   YOB: 1965  Date of Visit: 06/20/2022    To Whom It May Concern:    Sharron Rivera  was at [facility] on 06/20/2022. The patient may return to work/school on 6/21/22 with restrictions, light duty due to recent surgery/infection of foot. If you have any questions or concerns, or if I can be of further assistance, please do not hesitate to contact me.    Sincerely,    Mingo Melara DPM

## 2022-06-25 NOTE — PROGRESS NOTES
Subjective:      Patient ID: Billy Rivera is a 57 y.o. male.    Chief Complaint: Follow up toe amputation, Diabetes Mellitus (DRE Baker MD PCP seen last Thursday), and Post-op Evaluation (1st visit toe amputation)    Billy is a 57 y.o. male who presents to the clinic for evaluation and treatment of high risk feet. Billy has a past medical history of Allergy, CAD (coronary artery disease), native coronary artery (6/25/2013), Cancer, COVID-19 virus detected (9/1/2020), Diabetes mellitus, Diabetes mellitus, type 2, Disorder of kidney and ureter, Heart attack (04/2012), colon cancer, stage I, Hyperlipidemia, Hypertension, Muscular pain, NSTEMI May 2013 - peak troponin 0.22 (5/22/2013), MICHAELA (obstructive sleep apnea), and Retinopathy due to secondary diabetes. The patient's chief complaint is foot ulcer and hospital discharge follow-up.       no new complaints today.    PCP: BRAD Baker MD    Date Last Seen by PCP: 3/23/2022    Current shoe gear:  Affected Foot: DARCO Shoe right foot     Affected Foot: Tennis shoe left foot    History of Trauma: negative  Sign of Infection: none    Hemoglobin A1C   Date Value Ref Range Status   06/08/2022 10.0 (H) 4.0 - 5.6 % Final     Comment:     ADA Screening Guidelines:  5.7-6.4%  Consistent with prediabetes  >or=6.5%  Consistent with diabetes    High levels of fetal hemoglobin interfere with the HbA1C  assay. Heterozygous hemoglobin variants (HbS, HgC, etc)do  not significantly interfere with this assay.   However, presence of multiple variants may affect accuracy.     01/26/2022 12.2 (H) 4.0 - 5.6 % Final     Comment:     ADA Screening Guidelines:  5.7-6.4%  Consistent with prediabetes  >or=6.5%  Consistent with diabetes    High levels of fetal hemoglobin interfere with the HbA1C  assay. Heterozygous hemoglobin variants (HbS, HgC, etc)do  not significantly interfere with this assay.   However, presence of multiple variants may affect accuracy.     12/22/2020  10.5 (H) 4.0 - 5.6 % Final     Comment:     ADA Screening Guidelines:  5.7-6.4%  Consistent with prediabetes  >or=6.5%  Consistent with diabetes  High levels of fetal hemoglobin interfere with the HbA1C  assay. Heterozygous hemoglobin variants (HbS, HgC, etc)do  not significantly interfere with this assay.   However, presence of multiple variants may affect accuracy.         Review of Systems   Constitutional: Negative for chills, decreased appetite, fever, malaise/fatigue and night sweats.   HENT: Negative for congestion, hearing loss, nosebleeds and tinnitus.    Eyes: Negative for double vision, pain, photophobia and visual disturbance.   Cardiovascular: Negative for chest pain, claudication, cyanosis and leg swelling.   Respiratory: Negative for cough, hemoptysis, shortness of breath and wheezing.    Endocrine: Negative for cold intolerance and heat intolerance.   Hematologic/Lymphatic: Negative for adenopathy and bleeding problem.   Skin: Positive for poor wound healing. Negative for color change, dry skin, itching, nail changes, rash and suspicious lesions.   Musculoskeletal: Negative for arthritis, falls, gout, joint pain, myalgias and stiffness.   Gastrointestinal: Negative for abdominal pain, jaundice, nausea and vomiting.   Genitourinary: Negative for dysuria, frequency and hematuria.   Neurological: Positive for numbness and sensory change. Negative for difficulty with concentration, loss of balance and paresthesias.   Psychiatric/Behavioral: Negative for altered mental status, hallucinations and suicidal ideas. The patient is not nervous/anxious.    Allergic/Immunologic: Negative for environmental allergies and persistent infections.           Objective:        Physical Exam  Constitutional:       General: He is not in acute distress.     Appearance: Normal appearance.   Cardiovascular:      Pulses:           Dorsalis pedis pulses are 2+ on the right side and 2+ on the left side.        Posterior tibial  pulses are 2+ on the right side and 2+ on the left side.      Comments: CFT< 3 secs all toes bilateral foot, skin temp warm to cool bilateral foot, no hair growth bilateral lower extremity, no edema to bilateral lower extremities    Musculoskeletal:         General: No swelling.      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     5/5 muscle strength with DF/PF/Inv/Ev bilateral.    Inspection and palpation of the joints and bones reveal no crepitus or joint effusion. No tenderness upon palpation.  Angle and base of gait are normal.    Skin:     General: Skin is warm and dry.      Capillary Refill: Capillary refill takes 2 to 3 seconds.      Findings: No erythema.      Comments: Ulceration location: left foot plantar 1st ray amputation stump is healed at this time.  Signs of infection: none. No malodor, no erythema  Drainage: Serosanguinous  Purulence: no  Crepitus/fluctuance: no  Periwound: Macerated, Calloused  Base: Granular  Depth: subcutaneous tissue  Probe to bone: no    Ulceration location:Right plantar 2nd metatarsal head measuring 1.0 x 0.5 x 0.2cm  Signs of infection: none. No malodor, no erythema  Drainage: Serosanguinous  Purulence: no  Crepitus/fluctuance: no  Periwound: Macerated, Calloused  Base: Granular  Depth: subcutaneous tissue  Probe to bone: no   Neurological:      Mental Status: He is alert and oriented to person, place, and time.      Sensory: Sensory deficit present.      Motor: No weakness.      Comments: Diminished/loss of protective sensation all toes bilateral to 10 gram monofilament.   Psychiatric:         Mood and Affect: Mood normal.         Thought Content: Thought content normal.               Assessment:       Encounter Diagnoses   Name Primary?    Ulcer of right foot with fat layer exposed Yes    Type II diabetes mellitus with neurological manifestations          Plan:       Billy was seen today for follow up toe amputation, diabetes  "mellitus and post-op evaluation.    Diagnoses and all orders for this visit:    Ulcer of right foot with fat layer exposed  -     SUBSEQUENT HOME HEALTH ORDERS    Type II diabetes mellitus with neurological manifestations  -     SUBSEQUENT HOME HEALTH ORDERS      I counseled the patient on his conditions, their implications and medical management.    Education about the prevention of limb loss.     Discussed wound healing cycle, skin integrity, ways to care for skin.Counseled patient on the effects of high blood glucose on healing. He verbalizes understanding that it can increase the chances of delayed healing and this prolonged exposure leads to infection or progression of infection which subsequently can result in loss of limb.     Adequate vitamin supplementation, protein intake, and hydration - discussed with patient    Procedures     Wound Debridement    Performed by: Mingo Melara DPM   Authorized by: Patient    Time out: Immediately prior to procedure a "time out" was called to verify the correct patient, procedure, equipment, support staff and site/side marked as required.   Consent Done?:  Yes (Verbal)  Local anesthesia used?: No       Wound Details:    Location:    Right foot     Type of Debridement:  Excisional       Length (cm):   1.0       Width (cm):   0.5       Depth (cm):   0.2       Percent Debrided (%):  100             Depth of debridement:  Subcutaneous tissue    Tissue debrided:  Dermis, Epidermis and Subcutaneous    Devitalized tissue debrided:  Biofilm, Callus and Necrotic/Eschar    Instruments:  Blade, Curette and Nippers     Bleeding:  Minimal  Hemostasis Achieved: Yes    Method Used:  Pressure  Patient tolerance:  Patient tolerated the procedure well with no immediate complications    The nature of the condition, options for management, as well as potential risks and complications were discussed in detail with patient. Patient was amenable to my recommendations and left my office fully " informed and will follow up as instructed or sooner if necessary.       Wound care discussed in detail.  Continue Iodosorb  dressing every other day with dry sterile dressing bilateral foot.  Regular tennis shoe on the left, DARCO shoe on the right. Follow-up in 2 weeks for wound check, sooner prn.

## 2022-07-05 ENCOUNTER — TELEPHONE (OUTPATIENT)
Dept: PODIATRY | Facility: CLINIC | Age: 57
End: 2022-07-05
Payer: COMMERCIAL

## 2022-07-05 ENCOUNTER — OFFICE VISIT (OUTPATIENT)
Dept: PODIATRY | Facility: CLINIC | Age: 57
End: 2022-07-05
Payer: COMMERCIAL

## 2022-07-05 VITALS
WEIGHT: 278 LBS | HEART RATE: 62 BPM | BODY MASS INDEX: 35.68 KG/M2 | SYSTOLIC BLOOD PRESSURE: 151 MMHG | DIASTOLIC BLOOD PRESSURE: 85 MMHG | HEIGHT: 74 IN

## 2022-07-05 DIAGNOSIS — L97.522 FOOT ULCER, LEFT, WITH FAT LAYER EXPOSED: ICD-10-CM

## 2022-07-05 DIAGNOSIS — L97.512 FOOT ULCER, RIGHT, WITH FAT LAYER EXPOSED: ICD-10-CM

## 2022-07-05 DIAGNOSIS — E11.49 TYPE II DIABETES MELLITUS WITH NEUROLOGICAL MANIFESTATIONS: Primary | ICD-10-CM

## 2022-07-05 PROCEDURE — 1159F MED LIST DOCD IN RCRD: CPT | Mod: CPTII,S$GLB,, | Performed by: PODIATRIST

## 2022-07-05 PROCEDURE — 3046F HEMOGLOBIN A1C LEVEL >9.0%: CPT | Mod: CPTII,S$GLB,, | Performed by: PODIATRIST

## 2022-07-05 PROCEDURE — 3077F SYST BP >= 140 MM HG: CPT | Mod: CPTII,S$GLB,, | Performed by: PODIATRIST

## 2022-07-05 PROCEDURE — 3062F POS MACROALBUMINURIA REV: CPT | Mod: CPTII,S$GLB,, | Performed by: PODIATRIST

## 2022-07-05 PROCEDURE — 3079F DIAST BP 80-89 MM HG: CPT | Mod: CPTII,S$GLB,, | Performed by: PODIATRIST

## 2022-07-05 PROCEDURE — 11042 WOUND DEBRIDEMENT: ICD-10-PCS | Mod: 79,S$GLB,, | Performed by: PODIATRIST

## 2022-07-05 PROCEDURE — 3077F PR MOST RECENT SYSTOLIC BLOOD PRESSURE >= 140 MM HG: ICD-10-PCS | Mod: CPTII,S$GLB,, | Performed by: PODIATRIST

## 2022-07-05 PROCEDURE — 11042 DBRDMT SUBQ TIS 1ST 20SQCM/<: CPT | Mod: 79,S$GLB,, | Performed by: PODIATRIST

## 2022-07-05 PROCEDURE — 3008F PR BODY MASS INDEX (BMI) DOCUMENTED: ICD-10-PCS | Mod: CPTII,S$GLB,, | Performed by: PODIATRIST

## 2022-07-05 PROCEDURE — 3062F PR POS MACROALBUMINURIA RESULT DOCUMENTED/REVIEW: ICD-10-PCS | Mod: CPTII,S$GLB,, | Performed by: PODIATRIST

## 2022-07-05 PROCEDURE — 4010F ACE/ARB THERAPY RXD/TAKEN: CPT | Mod: CPTII,S$GLB,, | Performed by: PODIATRIST

## 2022-07-05 PROCEDURE — 3079F PR MOST RECENT DIASTOLIC BLOOD PRESSURE 80-89 MM HG: ICD-10-PCS | Mod: CPTII,S$GLB,, | Performed by: PODIATRIST

## 2022-07-05 PROCEDURE — 99999 PR PBB SHADOW E&M-EST. PATIENT-LVL IV: ICD-10-PCS | Mod: PBBFAC,,, | Performed by: PODIATRIST

## 2022-07-05 PROCEDURE — 1160F RVW MEDS BY RX/DR IN RCRD: CPT | Mod: CPTII,S$GLB,, | Performed by: PODIATRIST

## 2022-07-05 PROCEDURE — 3046F PR MOST RECENT HEMOGLOBIN A1C LEVEL > 9.0%: ICD-10-PCS | Mod: CPTII,S$GLB,, | Performed by: PODIATRIST

## 2022-07-05 PROCEDURE — 3008F BODY MASS INDEX DOCD: CPT | Mod: CPTII,S$GLB,, | Performed by: PODIATRIST

## 2022-07-05 PROCEDURE — 3066F PR DOCUMENTATION OF TREATMENT FOR NEPHROPATHY: ICD-10-PCS | Mod: CPTII,S$GLB,, | Performed by: PODIATRIST

## 2022-07-05 PROCEDURE — 99499 NO LOS: ICD-10-PCS | Mod: S$GLB,,, | Performed by: PODIATRIST

## 2022-07-05 PROCEDURE — 99999 PR PBB SHADOW E&M-EST. PATIENT-LVL IV: CPT | Mod: PBBFAC,,, | Performed by: PODIATRIST

## 2022-07-05 PROCEDURE — 3066F NEPHROPATHY DOC TX: CPT | Mod: CPTII,S$GLB,, | Performed by: PODIATRIST

## 2022-07-05 PROCEDURE — 1159F PR MEDICATION LIST DOCUMENTED IN MEDICAL RECORD: ICD-10-PCS | Mod: CPTII,S$GLB,, | Performed by: PODIATRIST

## 2022-07-05 PROCEDURE — 1160F PR REVIEW ALL MEDS BY PRESCRIBER/CLIN PHARMACIST DOCUMENTED: ICD-10-PCS | Mod: CPTII,S$GLB,, | Performed by: PODIATRIST

## 2022-07-05 PROCEDURE — 4010F PR ACE/ARB THEARPY RXD/TAKEN: ICD-10-PCS | Mod: CPTII,S$GLB,, | Performed by: PODIATRIST

## 2022-07-05 PROCEDURE — 99499 UNLISTED E&M SERVICE: CPT | Mod: S$GLB,,, | Performed by: PODIATRIST

## 2022-07-05 NOTE — PROGRESS NOTES
Subjective:      Patient ID: Billy Rivera is a 57 y.o. male.    Chief Complaint: Post-op Evaluation and Foot Ulcer    Billy is a 57 y.o. male who presents to the clinic for evaluation and treatment of high risk feet. Billy has a past medical history of Allergy, CAD (coronary artery disease), native coronary artery (6/25/2013), Cancer, COVID-19 virus detected (9/1/2020), Diabetes mellitus, Diabetes mellitus, type 2, Disorder of kidney and ureter, Heart attack (04/2012), colon cancer, stage I, Hyperlipidemia, Hypertension, Muscular pain, NSTEMI May 2013 - peak troponin 0.22 (5/22/2013), MICHAELA (obstructive sleep apnea), and Retinopathy due to secondary diabetes. The patient's chief complaint is foot ulcer both feet. Recently had R 5th ray amp with subsequent dehiscence and wound along incision. No new concerns.        no new complaints today.    PCP: BRAD Baker MD    Date Last Seen by PCP: 3/23/2022    Current shoe gear:  Affected Foot: DARCO Shoe right foot     Affected Foot: Tennis shoe left foot    History of Trauma: negative  Sign of Infection: none    Hemoglobin A1C   Date Value Ref Range Status   06/08/2022 10.0 (H) 4.0 - 5.6 % Final     Comment:     ADA Screening Guidelines:  5.7-6.4%  Consistent with prediabetes  >or=6.5%  Consistent with diabetes    High levels of fetal hemoglobin interfere with the HbA1C  assay. Heterozygous hemoglobin variants (HbS, HgC, etc)do  not significantly interfere with this assay.   However, presence of multiple variants may affect accuracy.     01/26/2022 12.2 (H) 4.0 - 5.6 % Final     Comment:     ADA Screening Guidelines:  5.7-6.4%  Consistent with prediabetes  >or=6.5%  Consistent with diabetes    High levels of fetal hemoglobin interfere with the HbA1C  assay. Heterozygous hemoglobin variants (HbS, HgC, etc)do  not significantly interfere with this assay.   However, presence of multiple variants may affect accuracy.     12/22/2020 10.5 (H) 4.0 - 5.6 % Final      Comment:     ADA Screening Guidelines:  5.7-6.4%  Consistent with prediabetes  >or=6.5%  Consistent with diabetes  High levels of fetal hemoglobin interfere with the HbA1C  assay. Heterozygous hemoglobin variants (HbS, HgC, etc)do  not significantly interfere with this assay.   However, presence of multiple variants may affect accuracy.         Review of Systems   Constitutional: Negative for chills, decreased appetite, fever, malaise/fatigue and night sweats.   HENT: Negative for congestion, hearing loss, nosebleeds and tinnitus.    Eyes: Negative for double vision, pain, photophobia and visual disturbance.   Cardiovascular: Negative for chest pain, claudication, cyanosis and leg swelling.   Respiratory: Negative for cough, hemoptysis, shortness of breath and wheezing.    Endocrine: Negative for cold intolerance and heat intolerance.   Hematologic/Lymphatic: Negative for adenopathy and bleeding problem.   Skin: Positive for poor wound healing. Negative for color change, dry skin, itching, nail changes, rash and suspicious lesions.   Musculoskeletal: Negative for arthritis, falls, gout, joint pain, myalgias and stiffness.   Gastrointestinal: Negative for abdominal pain, jaundice, nausea and vomiting.   Genitourinary: Negative for dysuria, frequency and hematuria.   Neurological: Positive for numbness and sensory change. Negative for difficulty with concentration, loss of balance and paresthesias.   Psychiatric/Behavioral: Negative for altered mental status, hallucinations and suicidal ideas. The patient is not nervous/anxious.    Allergic/Immunologic: Negative for environmental allergies and persistent infections.           Objective:        Physical Exam  Constitutional:       General: He is not in acute distress.     Appearance: Normal appearance.   Cardiovascular:      Pulses:           Dorsalis pedis pulses are 2+ on the right side and 2+ on the left side.        Posterior tibial pulses are 2+ on the right side  and 2+ on the left side.      Comments: CFT< 3 secs all toes bilateral foot, skin temp warm to cool bilateral foot, no hair growth bilateral lower extremity, no edema to bilateral lower extremities    Musculoskeletal:         General: No swelling.      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     5/5 muscle strength with DF/PF/Inv/Ev bilateral.    Inspection and palpation of the joints and bones reveal no crepitus or joint effusion. No tenderness upon palpation.  Angle and base of gait are normal.    Skin:     General: Skin is warm and dry.      Capillary Refill: Capillary refill takes 2 to 3 seconds.      Findings: No erythema.      Comments: Ulceration location: left foot plantar 1st ray amputation stump reopened  Measurement 1.0 x 0.8 x 0.1cm   Signs of infection: none.   No malodor, no erythema  Drainage: Serosanguinous  Purulence: no  Crepitus/fluctuance: no  Periwound: Macerated, Calloused  Base: Granular  Depth: subcutaneous tissue  Probe to bone: no    Ulceration location:Right plantar 2nd metatarsal head measuring 0.7 x 0.7 x 0.2cm  Signs of infection: none. No malodor, no erythema  Drainage: Serosanguinous  Purulence: no  Crepitus/fluctuance: no  Periwound: Macerated, Calloused  Base: Granular  Depth: subcutaneous tissue  Probe to bone: no    Dorsal lateral R 5th ray amputation site.   measuring 2.5 x 0.7 x 0.3cm  Signs of infection: none. No malodor, no erythema  Drainage: Serosanguinous  Purulence: no  Crepitus/fluctuance: no  Periwound: Macerated, Calloused    All wounds with granular base with overlying biofilm.    Neurological:      Mental Status: He is alert and oriented to person, place, and time.      Sensory: Sensory deficit present.      Motor: No weakness.      Comments: Diminished/loss of protective sensation all toes bilateral to 10 gram monofilament.   Psychiatric:         Mood and Affect: Mood normal.         Thought Content: Thought content  normal.               Assessment:       Encounter Diagnoses   Name Primary?    Type II diabetes mellitus with neurological manifestations Yes    Foot ulcer, right, with fat layer exposed     Foot ulcer, left, with fat layer exposed          Plan:       Billy was seen today for post-op evaluation and foot ulcer.    Diagnoses and all orders for this visit:    Type II diabetes mellitus with neurological manifestations    Foot ulcer, right, with fat layer exposed    Foot ulcer, left, with fat layer exposed      I counseled the patient on his conditions, their implications and medical management.    Education about the prevention of limb loss.     Discussed wound healing cycle, skin integrity, ways to care for skin.Counseled patient on the effects of high blood glucose on healing. He verbalizes understanding that it can increase the chances of delayed healing and this prolonged exposure leads to infection or progression of infection which subsequently can result in loss of limb.     Adequate vitamin supplementation, protein intake, and hydration - discussed with patient     Excisional debridement preformed of b/l foot wounds today. See separate procedure note    Leticia applied directly to all wounds after cleansing with alcohol. Applied Foam, football dressing to affected area. Dressing and foot/wound is to stay clean, dry and dressing to stay intact until follow up. Darco shoe dispensed and applied to the affected foot. Patient was advised to wear Darco shoe for ambulation at ALL times. Advised not to get dressing wet, remove dressing until follow up or walk directly on football dressing.     RTC 1 week, sooner PRN

## 2022-07-05 NOTE — TELEPHONE ENCOUNTER
----- Message from Chrissy Roblero sent at 7/5/2022  8:15 AM CDT -----  Contact: @[phone]  Pt requesting to be seen today rather than 07/11/22 pt states his wife is worried about his right toe.  Please call to discuss further.  Pt states he would see anyone if the Short is not available to day.

## 2022-07-05 NOTE — PROCEDURES
"Wound Debridement    Date/Time: 7/5/2022 2:15 PM  Performed by: Emelia Brock DPM  Authorized by: Emelia Brock DPM     Time out: Immediately prior to procedure a "time out" was called to verify the correct patient, procedure, equipment, support staff and site/side marked as required.    Consent Done?:  Yes (Verbal)    Preparation: Patient was prepped and draped in usual sterile fashion    Local anesthesia used?: No      Wound Details:    Location:  Left foot    Debridement - 1st Wound - Specific Location: plantar 1st ray amp site.    Type of Debridement:  Excisional       Length (cm):  1       Area (sq cm):  0.8       Width (cm):  0.8       Percent Debrided (%):  100       Depth (cm):  0.1       Total Area Debrided (sq cm):  0.8    Depth of debridement:  Subcutaneous tissue    Tissue debrided:  Dermis, Epidermis and Subcutaneous    Devitalized tissue debrided:  Biofilm, Callus and Slough    Instruments:  Curette    Additional wounds:  2    2nd Wound Details:     Location:  Right foot    Location:  Right 2nd Metatarsal Head    Location:  Right 2nd Metatarsal Head    Type of Debridement:  Excisional       Length (cm):  0.7       Area (sq cm):  0.49       Width (cm):  0.7       Percent Debrided (%):  100       Depth (cm):  0.2       Total Area Debrided (sq cm):  0.49    Depth of debridement:  Subcutaneous tissue    Tissue debrided:  Dermis, Epidermis and Subcutaneous    Devitalized tissue debrided:  Biofilm, Callus and Slough    Instruments:  Curette    3rd Wound Details:     Location:  Right foot    Debridement - 3rd Wound - Specific Location: 5th ray amp site.    Debridement - 3rd Wound - Specific Location: 5th ray amp site.    Type of Debridement:  Excisional       Length (cm):  2.5       Area (sq cm):  1.75       Width (cm):  0.7       Percent Debrided (%):  100       Depth (cm):  0.3       Total Area Debrided (sq cm):  1.75    Depth of debridement:  Subcutaneous tissue    Tissue debrided:  Dermis, Epidermis and " Subcutaneous    Devitalized tissue debrided:  Biofilm, Callus and Slough    Instruments:  Curette    Bleeding:  Moderate  Hemostasis Achieved: Yes    Method Used:  Pressure  Patient tolerance:  Patient tolerated the procedure well with no immediate complications

## 2022-07-11 ENCOUNTER — OFFICE VISIT (OUTPATIENT)
Dept: PODIATRY | Facility: CLINIC | Age: 57
End: 2022-07-11
Payer: COMMERCIAL

## 2022-07-11 VITALS
WEIGHT: 278 LBS | SYSTOLIC BLOOD PRESSURE: 157 MMHG | BODY MASS INDEX: 35.69 KG/M2 | HEART RATE: 61 BPM | DIASTOLIC BLOOD PRESSURE: 81 MMHG

## 2022-07-11 DIAGNOSIS — L97.512 FOOT ULCER, RIGHT, WITH FAT LAYER EXPOSED: ICD-10-CM

## 2022-07-11 DIAGNOSIS — L97.522 FOOT ULCER, LEFT, WITH FAT LAYER EXPOSED: ICD-10-CM

## 2022-07-11 DIAGNOSIS — E11.49 TYPE II DIABETES MELLITUS WITH NEUROLOGICAL MANIFESTATIONS: Primary | ICD-10-CM

## 2022-07-11 PROCEDURE — 3008F BODY MASS INDEX DOCD: CPT | Mod: CPTII,S$GLB,, | Performed by: PODIATRIST

## 2022-07-11 PROCEDURE — 3079F DIAST BP 80-89 MM HG: CPT | Mod: CPTII,S$GLB,, | Performed by: PODIATRIST

## 2022-07-11 PROCEDURE — 99999 PR PBB SHADOW E&M-EST. PATIENT-LVL III: ICD-10-PCS | Mod: PBBFAC,,, | Performed by: PODIATRIST

## 2022-07-11 PROCEDURE — 11042 PR DEBRIDEMENT, SKIN, SUB-Q TISSUE,=<20 SQ CM: ICD-10-PCS | Mod: 79,S$GLB,, | Performed by: PODIATRIST

## 2022-07-11 PROCEDURE — 3077F SYST BP >= 140 MM HG: CPT | Mod: CPTII,S$GLB,, | Performed by: PODIATRIST

## 2022-07-11 PROCEDURE — 99213 OFFICE O/P EST LOW 20 MIN: CPT | Mod: 24,25,S$GLB, | Performed by: PODIATRIST

## 2022-07-11 PROCEDURE — 3066F NEPHROPATHY DOC TX: CPT | Mod: CPTII,S$GLB,, | Performed by: PODIATRIST

## 2022-07-11 PROCEDURE — 3046F PR MOST RECENT HEMOGLOBIN A1C LEVEL > 9.0%: ICD-10-PCS | Mod: CPTII,S$GLB,, | Performed by: PODIATRIST

## 2022-07-11 PROCEDURE — 3062F PR POS MACROALBUMINURIA RESULT DOCUMENTED/REVIEW: ICD-10-PCS | Mod: CPTII,S$GLB,, | Performed by: PODIATRIST

## 2022-07-11 PROCEDURE — 4010F ACE/ARB THERAPY RXD/TAKEN: CPT | Mod: CPTII,S$GLB,, | Performed by: PODIATRIST

## 2022-07-11 PROCEDURE — 3008F PR BODY MASS INDEX (BMI) DOCUMENTED: ICD-10-PCS | Mod: CPTII,S$GLB,, | Performed by: PODIATRIST

## 2022-07-11 PROCEDURE — 3066F PR DOCUMENTATION OF TREATMENT FOR NEPHROPATHY: ICD-10-PCS | Mod: CPTII,S$GLB,, | Performed by: PODIATRIST

## 2022-07-11 PROCEDURE — 99213 PR OFFICE/OUTPT VISIT, EST, LEVL III, 20-29 MIN: ICD-10-PCS | Mod: 24,25,S$GLB, | Performed by: PODIATRIST

## 2022-07-11 PROCEDURE — 4010F PR ACE/ARB THEARPY RXD/TAKEN: ICD-10-PCS | Mod: CPTII,S$GLB,, | Performed by: PODIATRIST

## 2022-07-11 PROCEDURE — 99999 PR PBB SHADOW E&M-EST. PATIENT-LVL III: CPT | Mod: PBBFAC,,, | Performed by: PODIATRIST

## 2022-07-11 PROCEDURE — 3062F POS MACROALBUMINURIA REV: CPT | Mod: CPTII,S$GLB,, | Performed by: PODIATRIST

## 2022-07-11 PROCEDURE — 11042 DBRDMT SUBQ TIS 1ST 20SQCM/<: CPT | Mod: 79,S$GLB,, | Performed by: PODIATRIST

## 2022-07-11 PROCEDURE — 3046F HEMOGLOBIN A1C LEVEL >9.0%: CPT | Mod: CPTII,S$GLB,, | Performed by: PODIATRIST

## 2022-07-11 PROCEDURE — 3079F PR MOST RECENT DIASTOLIC BLOOD PRESSURE 80-89 MM HG: ICD-10-PCS | Mod: CPTII,S$GLB,, | Performed by: PODIATRIST

## 2022-07-11 PROCEDURE — 3077F PR MOST RECENT SYSTOLIC BLOOD PRESSURE >= 140 MM HG: ICD-10-PCS | Mod: CPTII,S$GLB,, | Performed by: PODIATRIST

## 2022-07-14 LAB — FUNGUS SPEC CULT: NORMAL

## 2022-07-18 ENCOUNTER — OFFICE VISIT (OUTPATIENT)
Dept: PODIATRY | Facility: CLINIC | Age: 57
End: 2022-07-18
Payer: COMMERCIAL

## 2022-07-18 VITALS
HEART RATE: 62 BPM | SYSTOLIC BLOOD PRESSURE: 161 MMHG | DIASTOLIC BLOOD PRESSURE: 96 MMHG | BODY MASS INDEX: 35.69 KG/M2 | WEIGHT: 278 LBS

## 2022-07-18 DIAGNOSIS — T81.30XA DEHISCENCE OF WOUND: ICD-10-CM

## 2022-07-18 DIAGNOSIS — E11.49 TYPE II DIABETES MELLITUS WITH NEUROLOGICAL MANIFESTATIONS: Primary | ICD-10-CM

## 2022-07-18 DIAGNOSIS — L97.522 FOOT ULCER, LEFT, WITH FAT LAYER EXPOSED: ICD-10-CM

## 2022-07-18 DIAGNOSIS — L97.512 FOOT ULCER, RIGHT, WITH FAT LAYER EXPOSED: ICD-10-CM

## 2022-07-18 PROCEDURE — 3062F PR POS MACROALBUMINURIA RESULT DOCUMENTED/REVIEW: ICD-10-PCS | Mod: CPTII,S$GLB,, | Performed by: PODIATRIST

## 2022-07-18 PROCEDURE — 3077F SYST BP >= 140 MM HG: CPT | Mod: CPTII,S$GLB,, | Performed by: PODIATRIST

## 2022-07-18 PROCEDURE — 4010F PR ACE/ARB THEARPY RXD/TAKEN: ICD-10-PCS | Mod: CPTII,S$GLB,, | Performed by: PODIATRIST

## 2022-07-18 PROCEDURE — 13160 PR SECD CLOS SURG WND EXTEN/COMPLIC: ICD-10-PCS | Mod: 58,S$GLB,, | Performed by: PODIATRIST

## 2022-07-18 PROCEDURE — 3080F DIAST BP >= 90 MM HG: CPT | Mod: CPTII,S$GLB,, | Performed by: PODIATRIST

## 2022-07-18 PROCEDURE — 99999 PR PBB SHADOW E&M-EST. PATIENT-LVL III: CPT | Mod: PBBFAC,,, | Performed by: PODIATRIST

## 2022-07-18 PROCEDURE — 13160 SEC CLSR SURG WND/DEHSN XTN: CPT | Mod: 58,S$GLB,, | Performed by: PODIATRIST

## 2022-07-18 PROCEDURE — 3062F POS MACROALBUMINURIA REV: CPT | Mod: CPTII,S$GLB,, | Performed by: PODIATRIST

## 2022-07-18 PROCEDURE — 99999 PR PBB SHADOW E&M-EST. PATIENT-LVL III: ICD-10-PCS | Mod: PBBFAC,,, | Performed by: PODIATRIST

## 2022-07-18 PROCEDURE — 3008F BODY MASS INDEX DOCD: CPT | Mod: CPTII,S$GLB,, | Performed by: PODIATRIST

## 2022-07-18 PROCEDURE — 1159F MED LIST DOCD IN RCRD: CPT | Mod: CPTII,S$GLB,, | Performed by: PODIATRIST

## 2022-07-18 PROCEDURE — 3046F HEMOGLOBIN A1C LEVEL >9.0%: CPT | Mod: CPTII,S$GLB,, | Performed by: PODIATRIST

## 2022-07-18 PROCEDURE — 3046F PR MOST RECENT HEMOGLOBIN A1C LEVEL > 9.0%: ICD-10-PCS | Mod: CPTII,S$GLB,, | Performed by: PODIATRIST

## 2022-07-18 PROCEDURE — 3077F PR MOST RECENT SYSTOLIC BLOOD PRESSURE >= 140 MM HG: ICD-10-PCS | Mod: CPTII,S$GLB,, | Performed by: PODIATRIST

## 2022-07-18 PROCEDURE — 3066F PR DOCUMENTATION OF TREATMENT FOR NEPHROPATHY: ICD-10-PCS | Mod: CPTII,S$GLB,, | Performed by: PODIATRIST

## 2022-07-18 PROCEDURE — 11042 DBRDMT SUBQ TIS 1ST 20SQCM/<: CPT | Mod: 79,59,S$GLB, | Performed by: PODIATRIST

## 2022-07-18 PROCEDURE — 3008F PR BODY MASS INDEX (BMI) DOCUMENTED: ICD-10-PCS | Mod: CPTII,S$GLB,, | Performed by: PODIATRIST

## 2022-07-18 PROCEDURE — 11042 PR DEBRIDEMENT, SKIN, SUB-Q TISSUE,=<20 SQ CM: ICD-10-PCS | Mod: 79,59,S$GLB, | Performed by: PODIATRIST

## 2022-07-18 PROCEDURE — 4010F ACE/ARB THERAPY RXD/TAKEN: CPT | Mod: CPTII,S$GLB,, | Performed by: PODIATRIST

## 2022-07-18 PROCEDURE — 1159F PR MEDICATION LIST DOCUMENTED IN MEDICAL RECORD: ICD-10-PCS | Mod: CPTII,S$GLB,, | Performed by: PODIATRIST

## 2022-07-18 PROCEDURE — 3080F PR MOST RECENT DIASTOLIC BLOOD PRESSURE >= 90 MM HG: ICD-10-PCS | Mod: CPTII,S$GLB,, | Performed by: PODIATRIST

## 2022-07-18 PROCEDURE — 99213 PR OFFICE/OUTPT VISIT, EST, LEVL III, 20-29 MIN: ICD-10-PCS | Mod: 24,57,25,S$GLB | Performed by: PODIATRIST

## 2022-07-18 PROCEDURE — 99213 OFFICE O/P EST LOW 20 MIN: CPT | Mod: 24,57,25,S$GLB | Performed by: PODIATRIST

## 2022-07-18 PROCEDURE — 3066F NEPHROPATHY DOC TX: CPT | Mod: CPTII,S$GLB,, | Performed by: PODIATRIST

## 2022-07-18 RX ORDER — DOXYCYCLINE HYCLATE 100 MG
100 TABLET ORAL 2 TIMES DAILY
Qty: 14 TABLET | Refills: 0 | Status: SHIPPED | OUTPATIENT
Start: 2022-07-18 | End: 2022-11-15 | Stop reason: SDUPTHER

## 2022-07-20 NOTE — PROGRESS NOTES
Subjective:      Patient ID: Billy Rivera is a 57 y.o. male.    Chief Complaint: Post-op Evaluation (Both feet football)    Billy is a 57 y.o. male who presents to the clinic for evaluation and treatment of high risk feet. Billy has a past medical history of Allergy, CAD (coronary artery disease), native coronary artery (6/25/2013), Cancer, COVID-19 virus detected (9/1/2020), Diabetes mellitus, Diabetes mellitus, type 2, Disorder of kidney and ureter, Heart attack (04/2012), colon cancer, stage I, Hyperlipidemia, Hypertension, Muscular pain, NSTEMI May 2013 - peak troponin 0.22 (5/22/2013), MICHAELA (obstructive sleep apnea), and Retinopathy due to secondary diabetes. The patient's chief complaint is foot ulcer both feet. Recently had R 5th ray amp with subsequent dehiscence and wound along incision. No new concerns.        no new complaints today.    PCP: BRAD Baker MD    Date Last Seen by PCP: 3/23/2022    Current shoe gear:  Affected Foot: DARCO Shoe right foot     Affected Foot: Tennis shoe left foot    History of Trauma: negative  Sign of Infection: none    Hemoglobin A1C   Date Value Ref Range Status   06/08/2022 10.0 (H) 4.0 - 5.6 % Final     Comment:     ADA Screening Guidelines:  5.7-6.4%  Consistent with prediabetes  >or=6.5%  Consistent with diabetes    High levels of fetal hemoglobin interfere with the HbA1C  assay. Heterozygous hemoglobin variants (HbS, HgC, etc)do  not significantly interfere with this assay.   However, presence of multiple variants may affect accuracy.     01/26/2022 12.2 (H) 4.0 - 5.6 % Final     Comment:     ADA Screening Guidelines:  5.7-6.4%  Consistent with prediabetes  >or=6.5%  Consistent with diabetes    High levels of fetal hemoglobin interfere with the HbA1C  assay. Heterozygous hemoglobin variants (HbS, HgC, etc)do  not significantly interfere with this assay.   However, presence of multiple variants may affect accuracy.     12/22/2020 10.5 (H) 4.0 - 5.6 %  Final     Comment:     ADA Screening Guidelines:  5.7-6.4%  Consistent with prediabetes  >or=6.5%  Consistent with diabetes  High levels of fetal hemoglobin interfere with the HbA1C  assay. Heterozygous hemoglobin variants (HbS, HgC, etc)do  not significantly interfere with this assay.   However, presence of multiple variants may affect accuracy.         Review of Systems   Constitutional: Negative for chills, decreased appetite, fever, malaise/fatigue and night sweats.   HENT: Negative for congestion, hearing loss, nosebleeds and tinnitus.    Eyes: Negative for double vision, pain, photophobia and visual disturbance.   Cardiovascular: Negative for chest pain, claudication, cyanosis and leg swelling.   Respiratory: Negative for cough, hemoptysis, shortness of breath and wheezing.    Endocrine: Negative for cold intolerance and heat intolerance.   Hematologic/Lymphatic: Negative for adenopathy and bleeding problem.   Skin: Positive for poor wound healing. Negative for color change, dry skin, itching, nail changes, rash and suspicious lesions.   Musculoskeletal: Negative for arthritis, falls, gout, joint pain, myalgias and stiffness.   Gastrointestinal: Negative for abdominal pain, jaundice, nausea and vomiting.   Genitourinary: Negative for dysuria, frequency and hematuria.   Neurological: Positive for numbness and sensory change. Negative for difficulty with concentration, loss of balance and paresthesias.   Psychiatric/Behavioral: Negative for altered mental status, hallucinations and suicidal ideas. The patient is not nervous/anxious.    Allergic/Immunologic: Negative for environmental allergies and persistent infections.           Objective:        Physical Exam  Constitutional:       General: He is not in acute distress.     Appearance: Normal appearance.   Cardiovascular:      Pulses:           Dorsalis pedis pulses are 2+ on the right side and 2+ on the left side.        Posterior tibial pulses are 2+ on the  right side and 2+ on the left side.      Comments: CFT< 3 secs all toes bilateral foot, skin temp warm to cool bilateral foot, no hair growth bilateral lower extremity, no edema to bilateral lower extremities    Musculoskeletal:         General: No swelling.      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     5/5 muscle strength with DF/PF/Inv/Ev bilateral.    Inspection and palpation of the joints and bones reveal no crepitus or joint effusion. No tenderness upon palpation.  Angle and base of gait are normal.    Skin:     General: Skin is warm and dry.      Capillary Refill: Capillary refill takes 2 to 3 seconds.      Findings: No erythema.      Comments: Ulceration location: left foot plantar 1st ray amputation stump reopened  Measurement 1.0 x 0.5 x 0.1cm   Signs of infection: none.   No malodor, no erythema  Drainage: Serosanguinous  Purulence: no  Crepitus/fluctuance: no  Periwound: Macerated, Calloused  Base: Granular  Depth: subcutaneous tissue  Probe to bone: no    Dorsal lateral R 5th ray amputation site.   measuring 2.5 x 0.5 x 0.3cm  Signs of infection: none. No malodor, no erythema  Drainage: Serosanguinous  Purulence: no  Crepitus/fluctuance: no  Periwound: Macerated, Calloused    All wounds with granular base with overlying biofilm.    Neurological:      Mental Status: He is alert and oriented to person, place, and time.      Sensory: Sensory deficit present.      Motor: No weakness.      Comments: Diminished/loss of protective sensation all toes bilateral to 10 gram monofilament.   Psychiatric:         Mood and Affect: Mood normal.         Thought Content: Thought content normal.               Assessment:       Encounter Diagnoses   Name Primary?    Type II diabetes mellitus with neurological manifestations Yes    Foot ulcer, right, with fat layer exposed     Foot ulcer, left, with fat layer exposed          Plan:       Billy was seen today for  "post-op evaluation.    Diagnoses and all orders for this visit:    Type II diabetes mellitus with neurological manifestations    Foot ulcer, right, with fat layer exposed    Foot ulcer, left, with fat layer exposed      I counseled the patient on his conditions, their implications and medical management.    Education about the prevention of limb loss.     Discussed wound healing cycle, skin integrity, ways to care for skin.Counseled patient on the effects of high blood glucose on healing. He verbalizes understanding that it can increase the chances of delayed healing and this prolonged exposure leads to infection or progression of infection which subsequently can result in loss of limb.     Adequate vitamin supplementation, protein intake, and hydration - discussed with patient     Excisional debridement preformed of b/l foot wounds today.     Wound Debridement     Date/Time: 1/11/2022 8:45 AM  Performed by: Emelia Brock DPM  Authorized by: Emelia Brock DPM     Time out: Immediately prior to procedure a "time out" was called to verify the correct patient, procedure, equipment, support staff and site/side marked as required.   Consent Done?:  Yes (Verbal)    Preparation: Patient was prepped and draped in usual sterile fashion    Local anesthesia used?: No       Wound Details:    Location:  Left foot    Location:  Left 1st Metatarsal Head    Type of Debridement:  Excisional       Length (cm):  0.4       Width (cm):  0.4       Percent Debrided (%):  100       Depth (cm):  0.1       Total Area Debrided (sq cm):  0.16    Depth of debridement:  Subcutaneous tissue    Tissue debrided:  Dermis, Epidermis and Subcutaneous    Devitalized tissue debrided:  Biofilm and Fibrin    Instruments:  Curette     Additional wounds:  1     2nd Wound Details:     Location:  Right foot    Location:  Right 3rd Metatarsal Head    Location:  Right 3rd Metatarsal Head    Type of Debridement:  Excisional       Length (cm):  2.5       Area (sq " cm):  0.5       Width (cm):  0.3       Percent Debrided (%):  100       Depth (cm):  0.1       Total Area Debrided (sq cm):  0.8    Depth of debridement:  Subcutaneous tissue    Tissue debrided:  Dermis, Epidermis and Subcutaneous    Devitalized tissue debrided:  Biofilm, Callus and Fibrin    Instruments:  Curette     Bleeding:  Moderate  Hemostasis Achieved: Yes    Method Used:  Pressure  Patient tolerance:  Patient tolerated the procedure well with no immediate complications     Leticia applied directly to all wounds after cleansing with alcohol. Applied Foam, football dressing to affected area. Dressing and foot/wound is to stay clean, dry and dressing to stay intact until follow up. Darco shoe dispensed and applied to the affected foot. Patient was advised to wear Darco shoe for ambulation at ALL times. Advised not to get dressing wet, remove dressing until follow up or walk directly on football dressing.     RTC 1 week, sooner PRN

## 2022-07-24 NOTE — PROGRESS NOTES
Subjective:      Patient ID: Billy Rivera is a 57 y.o. male.    Chief Complaint: Wound Care and Follow-up    Billy is a 57 y.o. male who presents to the clinic for evaluation and treatment of high risk feet. Billy has a past medical history of Allergy, CAD (coronary artery disease), native coronary artery (6/25/2013), Cancer, COVID-19 virus detected (9/1/2020), Diabetes mellitus, Diabetes mellitus, type 2, Disorder of kidney and ureter, Heart attack (04/2012), colon cancer, stage I, Hyperlipidemia, Hypertension, Muscular pain, NSTEMI May 2013 - peak troponin 0.22 (5/22/2013), MICHAELA (obstructive sleep apnea), and Retinopathy due to secondary diabetes. The patient's chief complaint is foot ulcer both feet. Recently had R 5th ray amp with subsequent dehiscence and wound along incision. No new concerns.  Possible wound closure today.       no new complaints today.    PCP: BRAD Baker MD    Date Last Seen by PCP: 3/23/2022    Current shoe gear:  Affected Foot: DARCO Shoe right foot     Affected Foot: Tennis shoe left foot    History of Trauma: negative  Sign of Infection: none    Hemoglobin A1C   Date Value Ref Range Status   06/08/2022 10.0 (H) 4.0 - 5.6 % Final     Comment:     ADA Screening Guidelines:  5.7-6.4%  Consistent with prediabetes  >or=6.5%  Consistent with diabetes    High levels of fetal hemoglobin interfere with the HbA1C  assay. Heterozygous hemoglobin variants (HbS, HgC, etc)do  not significantly interfere with this assay.   However, presence of multiple variants may affect accuracy.     01/26/2022 12.2 (H) 4.0 - 5.6 % Final     Comment:     ADA Screening Guidelines:  5.7-6.4%  Consistent with prediabetes  >or=6.5%  Consistent with diabetes    High levels of fetal hemoglobin interfere with the HbA1C  assay. Heterozygous hemoglobin variants (HbS, HgC, etc)do  not significantly interfere with this assay.   However, presence of multiple variants may affect accuracy.     12/22/2020 10.5 (H)  4.0 - 5.6 % Final     Comment:     ADA Screening Guidelines:  5.7-6.4%  Consistent with prediabetes  >or=6.5%  Consistent with diabetes  High levels of fetal hemoglobin interfere with the HbA1C  assay. Heterozygous hemoglobin variants (HbS, HgC, etc)do  not significantly interfere with this assay.   However, presence of multiple variants may affect accuracy.         Review of Systems   Constitutional: Negative for chills, decreased appetite, fever, malaise/fatigue and night sweats.   HENT: Negative for congestion, hearing loss, nosebleeds and tinnitus.    Eyes: Negative for double vision, pain, photophobia and visual disturbance.   Cardiovascular: Negative for chest pain, claudication, cyanosis and leg swelling.   Respiratory: Negative for cough, hemoptysis, shortness of breath and wheezing.    Endocrine: Negative for cold intolerance and heat intolerance.   Hematologic/Lymphatic: Negative for adenopathy and bleeding problem.   Skin: Positive for poor wound healing. Negative for color change, dry skin, itching, nail changes, rash and suspicious lesions.   Musculoskeletal: Negative for arthritis, falls, gout, joint pain, myalgias and stiffness.   Gastrointestinal: Negative for abdominal pain, jaundice, nausea and vomiting.   Genitourinary: Negative for dysuria, frequency and hematuria.   Neurological: Positive for numbness and sensory change. Negative for difficulty with concentration, loss of balance and paresthesias.   Psychiatric/Behavioral: Negative for altered mental status, hallucinations and suicidal ideas. The patient is not nervous/anxious.    Allergic/Immunologic: Negative for environmental allergies and persistent infections.           Objective:        Physical Exam  Constitutional:       General: He is not in acute distress.     Appearance: Normal appearance.   Cardiovascular:      Pulses:           Dorsalis pedis pulses are 2+ on the right side and 2+ on the left side.        Posterior tibial pulses are  2+ on the right side and 2+ on the left side.      Comments: CFT< 3 secs all toes bilateral foot, skin temp warm to cool bilateral foot, no hair growth bilateral lower extremity, no edema to bilateral lower extremities    Musculoskeletal:         General: No swelling.      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     5/5 muscle strength with DF/PF/Inv/Ev bilateral.    Inspection and palpation of the joints and bones reveal no crepitus or joint effusion. No tenderness upon palpation.  Angle and base of gait are normal.    Skin:     General: Skin is warm and dry.      Capillary Refill: Capillary refill takes 2 to 3 seconds.      Findings: No erythema.      Comments: Ulceration location: left foot plantar 1st ray amputation stump reopened  Measurement 1.0 x 0.2 x 0.1cm   Signs of infection: none.   No malodor, no erythema  Drainage: Serosanguinous  Purulence: no  Crepitus/fluctuance: no  Periwound: Macerated, Calloused  Base: Granular  Depth: subcutaneous tissue  Probe to bone: no    Dorsal lateral R 5th ray amputation site.   measuring 2.0 x 0.2 x 0.2cm  Signs of infection: none. No malodor, no erythema  Drainage: Serosanguinous  Purulence: no  Crepitus/fluctuance: no  Periwound: Macerated, Calloused    All wounds with granular base with overlying biofilm.    Neurological:      Mental Status: He is alert and oriented to person, place, and time.      Sensory: Sensory deficit present.      Motor: No weakness.      Comments: Diminished/loss of protective sensation all toes bilateral to 10 gram monofilament.   Psychiatric:         Mood and Affect: Mood normal.         Thought Content: Thought content normal.               Assessment:       Encounter Diagnoses   Name Primary?    Type II diabetes mellitus with neurological manifestations Yes    Foot ulcer, right, with fat layer exposed     Foot ulcer, left, with fat layer exposed     Dehiscence of wound          Plan:      "  Billy was seen today for wound care and follow-up.    Diagnoses and all orders for this visit:    Type II diabetes mellitus with neurological manifestations    Foot ulcer, right, with fat layer exposed    Foot ulcer, left, with fat layer exposed    Dehiscence of wound    Other orders  -     doxycycline (VIBRA-TABS) 100 MG tablet; Take 1 tablet (100 mg total) by mouth 2 (two) times daily.      I counseled the patient on his conditions, their implications and medical management.    Wound Debridement    Performed by: Mingo Melara DPM   Authorized by: Patient    Time out: Immediately prior to procedure a "time out" was called to verify the correct patient, procedure, equipment, support staff and site/side marked as required.   Consent Done?:  Yes (Verbal)  Local anesthesia used?: No       Wound Details:    Location:   plantar left foot     Type of Debridement:  Excisional       Length (cm):   1.0       Width (cm):   0.2       Depth (cm):   0.1       Percent Debrided (%):  100             Depth of debridement:  Subcutaneous tissue    Tissue debrided:  Dermis, Epidermis and Subcutaneous    Devitalized tissue debrided:  Biofilm, Callus and Necrotic/Eschar    Instruments:  Blade, Curette and Nippers     Bleeding:  Minimal  Hemostasis Achieved: Yes    Method Used:  Pressure  Patient tolerance:  Patient tolerated the procedure well with no immediate complications    Right foot previous incision with dehiscence prepped scrubbed and draped.  No anesthesia necessary due to level neuropathy.  Area debrided to subcutaneous tissue to healthy bleeding.  Area was then closed in layers with 4-0 Monocryl and 4-0 nylon suture material.  Sterile dressing applied.  Tolerated well with no complications.    Football dressing applied to the left, wound care discussed in detail.  Follow-up in 1 week.  "

## 2022-07-26 ENCOUNTER — OFFICE VISIT (OUTPATIENT)
Dept: PODIATRY | Facility: CLINIC | Age: 57
End: 2022-07-26
Payer: COMMERCIAL

## 2022-07-26 VITALS
DIASTOLIC BLOOD PRESSURE: 90 MMHG | HEIGHT: 74 IN | BODY MASS INDEX: 35.68 KG/M2 | HEART RATE: 76 BPM | WEIGHT: 278 LBS | SYSTOLIC BLOOD PRESSURE: 156 MMHG

## 2022-07-26 DIAGNOSIS — L97.512 FOOT ULCER, RIGHT, WITH FAT LAYER EXPOSED: ICD-10-CM

## 2022-07-26 DIAGNOSIS — E11.49 TYPE II DIABETES MELLITUS WITH NEUROLOGICAL MANIFESTATIONS: Primary | ICD-10-CM

## 2022-07-26 DIAGNOSIS — L97.522 FOOT ULCER, LEFT, WITH FAT LAYER EXPOSED: ICD-10-CM

## 2022-07-26 PROCEDURE — 3066F PR DOCUMENTATION OF TREATMENT FOR NEPHROPATHY: ICD-10-PCS | Mod: CPTII,S$GLB,, | Performed by: PODIATRIST

## 2022-07-26 PROCEDURE — 3046F PR MOST RECENT HEMOGLOBIN A1C LEVEL > 9.0%: ICD-10-PCS | Mod: CPTII,S$GLB,, | Performed by: PODIATRIST

## 2022-07-26 PROCEDURE — 3077F SYST BP >= 140 MM HG: CPT | Mod: CPTII,S$GLB,, | Performed by: PODIATRIST

## 2022-07-26 PROCEDURE — 3008F BODY MASS INDEX DOCD: CPT | Mod: CPTII,S$GLB,, | Performed by: PODIATRIST

## 2022-07-26 PROCEDURE — 99024 PR POST-OP FOLLOW-UP VISIT: ICD-10-PCS | Mod: S$GLB,,, | Performed by: PODIATRIST

## 2022-07-26 PROCEDURE — 1159F PR MEDICATION LIST DOCUMENTED IN MEDICAL RECORD: ICD-10-PCS | Mod: CPTII,S$GLB,, | Performed by: PODIATRIST

## 2022-07-26 PROCEDURE — 4010F ACE/ARB THERAPY RXD/TAKEN: CPT | Mod: CPTII,S$GLB,, | Performed by: PODIATRIST

## 2022-07-26 PROCEDURE — 99999 PR PBB SHADOW E&M-EST. PATIENT-LVL III: CPT | Mod: PBBFAC,,, | Performed by: PODIATRIST

## 2022-07-26 PROCEDURE — 99999 PR PBB SHADOW E&M-EST. PATIENT-LVL III: ICD-10-PCS | Mod: PBBFAC,,, | Performed by: PODIATRIST

## 2022-07-26 PROCEDURE — 3008F PR BODY MASS INDEX (BMI) DOCUMENTED: ICD-10-PCS | Mod: CPTII,S$GLB,, | Performed by: PODIATRIST

## 2022-07-26 PROCEDURE — 1159F MED LIST DOCD IN RCRD: CPT | Mod: CPTII,S$GLB,, | Performed by: PODIATRIST

## 2022-07-26 PROCEDURE — 3046F HEMOGLOBIN A1C LEVEL >9.0%: CPT | Mod: CPTII,S$GLB,, | Performed by: PODIATRIST

## 2022-07-26 PROCEDURE — 3066F NEPHROPATHY DOC TX: CPT | Mod: CPTII,S$GLB,, | Performed by: PODIATRIST

## 2022-07-26 PROCEDURE — 3080F PR MOST RECENT DIASTOLIC BLOOD PRESSURE >= 90 MM HG: ICD-10-PCS | Mod: CPTII,S$GLB,, | Performed by: PODIATRIST

## 2022-07-26 PROCEDURE — 3062F POS MACROALBUMINURIA REV: CPT | Mod: CPTII,S$GLB,, | Performed by: PODIATRIST

## 2022-07-26 PROCEDURE — 3062F PR POS MACROALBUMINURIA RESULT DOCUMENTED/REVIEW: ICD-10-PCS | Mod: CPTII,S$GLB,, | Performed by: PODIATRIST

## 2022-07-26 PROCEDURE — 3077F PR MOST RECENT SYSTOLIC BLOOD PRESSURE >= 140 MM HG: ICD-10-PCS | Mod: CPTII,S$GLB,, | Performed by: PODIATRIST

## 2022-07-26 PROCEDURE — 4010F PR ACE/ARB THEARPY RXD/TAKEN: ICD-10-PCS | Mod: CPTII,S$GLB,, | Performed by: PODIATRIST

## 2022-07-26 PROCEDURE — 99024 POSTOP FOLLOW-UP VISIT: CPT | Mod: S$GLB,,, | Performed by: PODIATRIST

## 2022-07-26 PROCEDURE — 3080F DIAST BP >= 90 MM HG: CPT | Mod: CPTII,S$GLB,, | Performed by: PODIATRIST

## 2022-07-27 LAB
ACID FAST MOD KINY STN SPEC: NORMAL
MYCOBACTERIUM SPEC QL CULT: NORMAL

## 2022-07-28 LAB
ACID FAST MOD KINY STN SPEC: NORMAL
MYCOBACTERIUM SPEC QL CULT: NORMAL

## 2022-08-04 ENCOUNTER — OFFICE VISIT (OUTPATIENT)
Dept: PODIATRY | Facility: CLINIC | Age: 57
End: 2022-08-04
Payer: COMMERCIAL

## 2022-08-04 VITALS
BODY MASS INDEX: 35.68 KG/M2 | HEART RATE: 94 BPM | WEIGHT: 278 LBS | HEIGHT: 74 IN | SYSTOLIC BLOOD PRESSURE: 150 MMHG | DIASTOLIC BLOOD PRESSURE: 91 MMHG

## 2022-08-04 DIAGNOSIS — E11.49 TYPE II DIABETES MELLITUS WITH NEUROLOGICAL MANIFESTATIONS: Primary | ICD-10-CM

## 2022-08-04 DIAGNOSIS — L97.522 FOOT ULCER, LEFT, WITH FAT LAYER EXPOSED: ICD-10-CM

## 2022-08-04 DIAGNOSIS — L97.512 FOOT ULCER, RIGHT, WITH FAT LAYER EXPOSED: ICD-10-CM

## 2022-08-04 PROCEDURE — 3080F PR MOST RECENT DIASTOLIC BLOOD PRESSURE >= 90 MM HG: ICD-10-PCS | Mod: CPTII,S$GLB,, | Performed by: PODIATRIST

## 2022-08-04 PROCEDURE — 3046F HEMOGLOBIN A1C LEVEL >9.0%: CPT | Mod: CPTII,S$GLB,, | Performed by: PODIATRIST

## 2022-08-04 PROCEDURE — 4010F PR ACE/ARB THEARPY RXD/TAKEN: ICD-10-PCS | Mod: CPTII,S$GLB,, | Performed by: PODIATRIST

## 2022-08-04 PROCEDURE — 1160F PR REVIEW ALL MEDS BY PRESCRIBER/CLIN PHARMACIST DOCUMENTED: ICD-10-PCS | Mod: CPTII,S$GLB,, | Performed by: PODIATRIST

## 2022-08-04 PROCEDURE — 99999 PR PBB SHADOW E&M-EST. PATIENT-LVL IV: ICD-10-PCS | Mod: PBBFAC,,, | Performed by: PODIATRIST

## 2022-08-04 PROCEDURE — 99024 PR POST-OP FOLLOW-UP VISIT: ICD-10-PCS | Mod: S$GLB,,, | Performed by: PODIATRIST

## 2022-08-04 PROCEDURE — 4010F ACE/ARB THERAPY RXD/TAKEN: CPT | Mod: CPTII,S$GLB,, | Performed by: PODIATRIST

## 2022-08-04 PROCEDURE — 1160F RVW MEDS BY RX/DR IN RCRD: CPT | Mod: CPTII,S$GLB,, | Performed by: PODIATRIST

## 2022-08-04 PROCEDURE — 3008F BODY MASS INDEX DOCD: CPT | Mod: CPTII,S$GLB,, | Performed by: PODIATRIST

## 2022-08-04 PROCEDURE — 3080F DIAST BP >= 90 MM HG: CPT | Mod: CPTII,S$GLB,, | Performed by: PODIATRIST

## 2022-08-04 PROCEDURE — 3046F PR MOST RECENT HEMOGLOBIN A1C LEVEL > 9.0%: ICD-10-PCS | Mod: CPTII,S$GLB,, | Performed by: PODIATRIST

## 2022-08-04 PROCEDURE — 3077F PR MOST RECENT SYSTOLIC BLOOD PRESSURE >= 140 MM HG: ICD-10-PCS | Mod: CPTII,S$GLB,, | Performed by: PODIATRIST

## 2022-08-04 PROCEDURE — 3008F PR BODY MASS INDEX (BMI) DOCUMENTED: ICD-10-PCS | Mod: CPTII,S$GLB,, | Performed by: PODIATRIST

## 2022-08-04 PROCEDURE — 99024 POSTOP FOLLOW-UP VISIT: CPT | Mod: S$GLB,,, | Performed by: PODIATRIST

## 2022-08-04 PROCEDURE — 3062F POS MACROALBUMINURIA REV: CPT | Mod: CPTII,S$GLB,, | Performed by: PODIATRIST

## 2022-08-04 PROCEDURE — 3077F SYST BP >= 140 MM HG: CPT | Mod: CPTII,S$GLB,, | Performed by: PODIATRIST

## 2022-08-04 PROCEDURE — 3066F NEPHROPATHY DOC TX: CPT | Mod: CPTII,S$GLB,, | Performed by: PODIATRIST

## 2022-08-04 PROCEDURE — 1159F PR MEDICATION LIST DOCUMENTED IN MEDICAL RECORD: ICD-10-PCS | Mod: CPTII,S$GLB,, | Performed by: PODIATRIST

## 2022-08-04 PROCEDURE — 3062F PR POS MACROALBUMINURIA RESULT DOCUMENTED/REVIEW: ICD-10-PCS | Mod: CPTII,S$GLB,, | Performed by: PODIATRIST

## 2022-08-04 PROCEDURE — 99999 PR PBB SHADOW E&M-EST. PATIENT-LVL IV: CPT | Mod: PBBFAC,,, | Performed by: PODIATRIST

## 2022-08-04 PROCEDURE — 3066F PR DOCUMENTATION OF TREATMENT FOR NEPHROPATHY: ICD-10-PCS | Mod: CPTII,S$GLB,, | Performed by: PODIATRIST

## 2022-08-04 PROCEDURE — 1159F MED LIST DOCD IN RCRD: CPT | Mod: CPTII,S$GLB,, | Performed by: PODIATRIST

## 2022-08-04 NOTE — PROGRESS NOTES
Subjective:      Patient ID: Billy Rivera is a 57 y.o. male.    Chief Complaint: Post-op Evaluation (Bilateral football(Dr. Melara out sick))    Billy is a 57 y.o. male who presents to the clinic for evaluation and treatment of high risk feet. Billy has a past medical history of Allergy, CAD (coronary artery disease), native coronary artery (6/25/2013), Cancer, COVID-19 virus detected (9/1/2020), Diabetes mellitus, Diabetes mellitus, type 2, Disorder of kidney and ureter, Heart attack (04/2012), colon cancer, stage I, Hyperlipidemia, Hypertension, Muscular pain, NSTEMI May 2013 - peak troponin 0.22 (5/22/2013), MICHAELA (obstructive sleep apnea), and Retinopathy due to secondary diabetes. The patient's chief complaint is foot ulcer both feet. Recently had R 5th ray amp with subsequent dehiscence and wound along incision. No new concerns.  Doing well after wound closure, follow-up wound care.       no new complaints today.    PCP: BARD Baker MD    Date Last Seen by PCP: 3/23/2022    Current shoe gear:  Affected Foot: DARCO Shoe right foot     Affected Foot: Tennis shoe left foot    History of Trauma: negative  Sign of Infection: none    Hemoglobin A1C   Date Value Ref Range Status   06/08/2022 10.0 (H) 4.0 - 5.6 % Final     Comment:     ADA Screening Guidelines:  5.7-6.4%  Consistent with prediabetes  >or=6.5%  Consistent with diabetes    High levels of fetal hemoglobin interfere with the HbA1C  assay. Heterozygous hemoglobin variants (HbS, HgC, etc)do  not significantly interfere with this assay.   However, presence of multiple variants may affect accuracy.     01/26/2022 12.2 (H) 4.0 - 5.6 % Final     Comment:     ADA Screening Guidelines:  5.7-6.4%  Consistent with prediabetes  >or=6.5%  Consistent with diabetes    High levels of fetal hemoglobin interfere with the HbA1C  assay. Heterozygous hemoglobin variants (HbS, HgC, etc)do  not significantly interfere with this assay.   However, presence of  multiple variants may affect accuracy.     12/22/2020 10.5 (H) 4.0 - 5.6 % Final     Comment:     ADA Screening Guidelines:  5.7-6.4%  Consistent with prediabetes  >or=6.5%  Consistent with diabetes  High levels of fetal hemoglobin interfere with the HbA1C  assay. Heterozygous hemoglobin variants (HbS, HgC, etc)do  not significantly interfere with this assay.   However, presence of multiple variants may affect accuracy.         Review of Systems   Constitutional: Negative for chills, decreased appetite, fever, malaise/fatigue and night sweats.   HENT: Negative for congestion, hearing loss, nosebleeds and tinnitus.    Eyes: Negative for double vision, pain, photophobia and visual disturbance.   Cardiovascular: Positive for leg swelling. Negative for chest pain, claudication and cyanosis.   Respiratory: Negative for cough, hemoptysis, shortness of breath and wheezing.    Endocrine: Negative for cold intolerance and heat intolerance.   Hematologic/Lymphatic: Negative for adenopathy and bleeding problem.   Skin: Positive for poor wound healing. Negative for color change, dry skin, itching, nail changes, rash and suspicious lesions.   Musculoskeletal: Negative for arthritis, falls, gout, joint pain, myalgias and stiffness.   Gastrointestinal: Negative for abdominal pain, jaundice, nausea and vomiting.   Genitourinary: Negative for dysuria, frequency and hematuria.   Neurological: Positive for numbness and sensory change. Negative for difficulty with concentration, loss of balance and paresthesias.   Psychiatric/Behavioral: Negative for altered mental status, hallucinations and suicidal ideas. The patient is not nervous/anxious.    Allergic/Immunologic: Negative for environmental allergies and persistent infections.           Objective:        Physical Exam  Constitutional:       General: He is not in acute distress.     Appearance: Normal appearance.   Cardiovascular:      Pulses:           Dorsalis pedis pulses are 2+  on the right side and 2+ on the left side.        Posterior tibial pulses are 2+ on the right side and 2+ on the left side.      Comments: CFT< 3 secs all toes bilateral foot, skin temp warm to cool bilateral foot, no hair growth bilateral lower extremity, trace edema to bilateral lower extremities    Musculoskeletal:         General: No swelling.      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     Lateral ray amputations right with sutures remaining distal lateral wound  without ulceration, drainage, pus, tracking, fluctuance, malodor, or cardinal signs infection.     Wound:  Plantar 2nd mtpj right    Size:    Pre:[id number]  Post: [id number]    Base:  Granular, pink with moderate serous/serosanaguinous drainage only.  No pus, tracking, fluctuance, malodor, or cardinal signs infection.    Borders:  Hyperkeratotic, debriding to flat, pink, blanchable skin edges without undermining.      5/5 muscle strength with DF/PF/Inv/Ev bilateral.    Inspection and palpation of the joints and bones reveal no crepitus or joint effusion. No tenderness upon palpation.  Angle and base of gait are normal.    Skin:     General: Skin is warm and dry.      Capillary Refill: Capillary refill takes 2 to 3 seconds.      Findings: No erythema.      Comments: Ulceration location: left foot plantar 1st ray amputation stump reopened  Measurement 0.8 x 0.2 x 0.1cm   Signs of infection: none.   No malodor, no erythema  Drainage: Serosanguinous  Purulence: no  Crepitus/fluctuance: no  Periwound: Macerated, Calloused  Base: Granular  Depth: subcutaneous tissue  Probe to bone: no    Dorsal lateral R 5th ray amputation site, healing well.      All wounds with granular base with overlying biofilm.    Neurological:      Mental Status: He is alert and oriented to person, place, and time.      Sensory: Sensory deficit present.      Motor: No weakness.      Comments: Diminished/loss of protective sensation all  "toes bilateral to 10 gram monofilament.   Psychiatric:         Mood and Affect: Mood normal.         Thought Content: Thought content normal.               Assessment:       Encounter Diagnoses   Name Primary?    Type II diabetes mellitus with neurological manifestations Yes    Foot ulcer, left, with fat layer exposed     Foot ulcer, right, with fat layer exposed          Plan:       Billy was seen today for post-op evaluation.    Diagnoses and all orders for this visit:    Type II diabetes mellitus with neurological manifestations    Foot ulcer, left, with fat layer exposed    Foot ulcer, right, with fat layer exposed      I counseled the patient on his conditions, their implications and medical management.    Wound Debridement    Performed by: Mingo Melara DPM   Authorized by: Patient    Time out: Immediately prior to procedure a "time out" was called to verify the correct patient, procedure, equipment, support staff and site/side marked as required.   Consent Done?:  Yes (Verbal)  Local anesthesia used?: No       Wound Details:    Location:   plantar left foot     Type of Debridement:  Excisional       Length (cm):  0.8       Width (cm):   0.2       Depth (cm):   0.1       Percent Debrided (%):  100             Depth of debridement:  Subcutaneous tissue    Tissue debrided:  Dermis, Epidermis and Subcutaneous    Devitalized tissue debrided:  Biofilm, Callus and Necrotic/Eschar    Instruments:  Blade, Curette and Nippers     Bleeding:  Minimal  Hemostasis Achieved: Yes    Method Used:  Pressure  Patient tolerance:  Patient tolerated the procedure well with no immediate complications      Football dressing applied to the left, wound care discussed in detail.  Follow-up in 1 week.      "

## 2022-08-04 NOTE — PROCEDURES
"Wound Debridement    Date/Time: 8/4/2022 8:16 AM  Performed by: Chris Groves DPM  Authorized by: Chris Groves DPM     Time out: Immediately prior to procedure a "time out" was called to verify the correct patient, procedure, equipment, support staff and site/side marked as required.    Consent Done?:  Yes (Verbal)  Local anesthesia used?: No      Wound Details:    Location:  Right foot    Location:  Right 2nd Metatarsal Head    Type of Debridement:  Excisional       Length (cm):  1       Area (sq cm):  1       Width (cm):  1       Percent Debrided (%):  100       Depth (cm):  0.2       Total Area Debrided (sq cm):  1    Depth of debridement:  Subcutaneous tissue    Tissue debrided:  Dermis, Subcutaneous and Epidermis    Devitalized tissue debrided:  Biofilm and Callus    Instruments:  Blade and Curette    Bleeding:  Minimal  Hemostasis Achieved: Yes    Method Used:  Pressure  Patient tolerance:  Patient tolerated the procedure well with no immediate complications      "

## 2022-08-09 ENCOUNTER — OFFICE VISIT (OUTPATIENT)
Dept: INTERNAL MEDICINE | Facility: CLINIC | Age: 57
End: 2022-08-09
Payer: COMMERCIAL

## 2022-08-09 ENCOUNTER — LAB VISIT (OUTPATIENT)
Dept: LAB | Facility: HOSPITAL | Age: 57
End: 2022-08-09
Payer: COMMERCIAL

## 2022-08-09 VITALS
OXYGEN SATURATION: 97 % | SYSTOLIC BLOOD PRESSURE: 130 MMHG | DIASTOLIC BLOOD PRESSURE: 80 MMHG | TEMPERATURE: 98 F | HEIGHT: 74 IN | BODY MASS INDEX: 36.36 KG/M2 | HEART RATE: 62 BPM | RESPIRATION RATE: 14 BRPM | WEIGHT: 283.31 LBS

## 2022-08-09 DIAGNOSIS — E11.51 TYPE 2 DIABETES MELLITUS WITH DIABETIC PERIPHERAL ANGIOPATHY WITHOUT GANGRENE, WITH LONG-TERM CURRENT USE OF INSULIN: Chronic | ICD-10-CM

## 2022-08-09 DIAGNOSIS — Z96.41 INSULIN PUMP IN PLACE: ICD-10-CM

## 2022-08-09 DIAGNOSIS — E11.69 DYSLIPIDEMIA ASSOCIATED WITH TYPE 2 DIABETES MELLITUS: ICD-10-CM

## 2022-08-09 DIAGNOSIS — I25.10 CORONARY ARTERY DISEASE INVOLVING NATIVE CORONARY ARTERY OF NATIVE HEART WITHOUT ANGINA PECTORIS: ICD-10-CM

## 2022-08-09 DIAGNOSIS — Z79.4 TYPE 2 DIABETES MELLITUS WITH BOTH EYES AFFECTED BY MODERATE NONPROLIFERATIVE RETINOPATHY WITHOUT MACULAR EDEMA, WITH LONG-TERM CURRENT USE OF INSULIN: ICD-10-CM

## 2022-08-09 DIAGNOSIS — E11.59 HYPERTENSION ASSOCIATED WITH DIABETES: Chronic | ICD-10-CM

## 2022-08-09 DIAGNOSIS — S98.132A AMPUTATION OF TOE OF LEFT FOOT: ICD-10-CM

## 2022-08-09 DIAGNOSIS — Z79.4 TYPE 2 DIABETES MELLITUS WITH DIABETIC PERIPHERAL ANGIOPATHY WITHOUT GANGRENE, WITH LONG-TERM CURRENT USE OF INSULIN: Chronic | ICD-10-CM

## 2022-08-09 DIAGNOSIS — E11.3393 TYPE 2 DIABETES MELLITUS WITH BOTH EYES AFFECTED BY MODERATE NONPROLIFERATIVE RETINOPATHY WITHOUT MACULAR EDEMA, WITH LONG-TERM CURRENT USE OF INSULIN: Primary | ICD-10-CM

## 2022-08-09 DIAGNOSIS — I15.2 HYPERTENSION ASSOCIATED WITH DIABETES: Chronic | ICD-10-CM

## 2022-08-09 DIAGNOSIS — Z79.4 TYPE 2 DIABETES MELLITUS WITH BOTH EYES AFFECTED BY MODERATE NONPROLIFERATIVE RETINOPATHY WITHOUT MACULAR EDEMA, WITH LONG-TERM CURRENT USE OF INSULIN: Primary | ICD-10-CM

## 2022-08-09 DIAGNOSIS — E11.3393 TYPE 2 DIABETES MELLITUS WITH BOTH EYES AFFECTED BY MODERATE NONPROLIFERATIVE RETINOPATHY WITHOUT MACULAR EDEMA, WITH LONG-TERM CURRENT USE OF INSULIN: ICD-10-CM

## 2022-08-09 DIAGNOSIS — E78.5 DYSLIPIDEMIA ASSOCIATED WITH TYPE 2 DIABETES MELLITUS: ICD-10-CM

## 2022-08-09 DIAGNOSIS — E66.01 CLASS 2 SEVERE OBESITY WITH BODY MASS INDEX (BMI) OF 35 TO 39.9 WITH SERIOUS COMORBIDITY: ICD-10-CM

## 2022-08-09 LAB
ALBUMIN SERPL BCP-MCNC: 3.7 G/DL (ref 3.5–5.2)
ALP SERPL-CCNC: 88 U/L (ref 55–135)
ALT SERPL W/O P-5'-P-CCNC: 33 U/L (ref 10–44)
ANION GAP SERPL CALC-SCNC: 10 MMOL/L (ref 8–16)
AST SERPL-CCNC: 31 U/L (ref 10–40)
BILIRUB SERPL-MCNC: 0.6 MG/DL (ref 0.1–1)
BUN SERPL-MCNC: 19 MG/DL (ref 6–20)
CALCIUM SERPL-MCNC: 9.6 MG/DL (ref 8.7–10.5)
CHLORIDE SERPL-SCNC: 101 MMOL/L (ref 95–110)
CO2 SERPL-SCNC: 25 MMOL/L (ref 23–29)
CREAT SERPL-MCNC: 1 MG/DL (ref 0.5–1.4)
EST. GFR  (NO RACE VARIABLE): >60 ML/MIN/1.73 M^2
ESTIMATED AVG GLUCOSE: 183 MG/DL (ref 68–131)
GLUCOSE SERPL-MCNC: 91 MG/DL (ref 70–110)
HBA1C MFR BLD: 8 % (ref 4–5.6)
POTASSIUM SERPL-SCNC: 4.5 MMOL/L (ref 3.5–5.1)
PROT SERPL-MCNC: 7.5 G/DL (ref 6–8.4)
SODIUM SERPL-SCNC: 136 MMOL/L (ref 136–145)

## 2022-08-09 PROCEDURE — 3008F PR BODY MASS INDEX (BMI) DOCUMENTED: ICD-10-PCS | Mod: CPTII,S$GLB,, | Performed by: NURSE PRACTITIONER

## 2022-08-09 PROCEDURE — 1159F MED LIST DOCD IN RCRD: CPT | Mod: CPTII,S$GLB,, | Performed by: NURSE PRACTITIONER

## 2022-08-09 PROCEDURE — 3008F BODY MASS INDEX DOCD: CPT | Mod: CPTII,S$GLB,, | Performed by: NURSE PRACTITIONER

## 2022-08-09 PROCEDURE — 3079F DIAST BP 80-89 MM HG: CPT | Mod: CPTII,S$GLB,, | Performed by: NURSE PRACTITIONER

## 2022-08-09 PROCEDURE — 1159F PR MEDICATION LIST DOCUMENTED IN MEDICAL RECORD: ICD-10-PCS | Mod: CPTII,S$GLB,, | Performed by: NURSE PRACTITIONER

## 2022-08-09 PROCEDURE — 3066F PR DOCUMENTATION OF TREATMENT FOR NEPHROPATHY: ICD-10-PCS | Mod: CPTII,S$GLB,, | Performed by: NURSE PRACTITIONER

## 2022-08-09 PROCEDURE — 4010F ACE/ARB THERAPY RXD/TAKEN: CPT | Mod: CPTII,S$GLB,, | Performed by: NURSE PRACTITIONER

## 2022-08-09 PROCEDURE — 3062F POS MACROALBUMINURIA REV: CPT | Mod: CPTII,S$GLB,, | Performed by: NURSE PRACTITIONER

## 2022-08-09 PROCEDURE — 3046F PR MOST RECENT HEMOGLOBIN A1C LEVEL > 9.0%: ICD-10-PCS | Mod: CPTII,S$GLB,, | Performed by: NURSE PRACTITIONER

## 2022-08-09 PROCEDURE — 3075F PR MOST RECENT SYSTOLIC BLOOD PRESS GE 130-139MM HG: ICD-10-PCS | Mod: CPTII,S$GLB,, | Performed by: NURSE PRACTITIONER

## 2022-08-09 PROCEDURE — 4010F PR ACE/ARB THEARPY RXD/TAKEN: ICD-10-PCS | Mod: CPTII,S$GLB,, | Performed by: NURSE PRACTITIONER

## 2022-08-09 PROCEDURE — 3046F HEMOGLOBIN A1C LEVEL >9.0%: CPT | Mod: CPTII,S$GLB,, | Performed by: NURSE PRACTITIONER

## 2022-08-09 PROCEDURE — 99999 PR PBB SHADOW E&M-EST. PATIENT-LVL V: ICD-10-PCS | Mod: PBBFAC,,, | Performed by: NURSE PRACTITIONER

## 2022-08-09 PROCEDURE — 83036 HEMOGLOBIN GLYCOSYLATED A1C: CPT | Performed by: NURSE PRACTITIONER

## 2022-08-09 PROCEDURE — 36415 COLL VENOUS BLD VENIPUNCTURE: CPT | Mod: PO | Performed by: NURSE PRACTITIONER

## 2022-08-09 PROCEDURE — 99215 PR OFFICE/OUTPT VISIT, EST, LEVL V, 40-54 MIN: ICD-10-PCS | Mod: S$GLB,,, | Performed by: NURSE PRACTITIONER

## 2022-08-09 PROCEDURE — 99215 OFFICE O/P EST HI 40 MIN: CPT | Mod: S$GLB,,, | Performed by: NURSE PRACTITIONER

## 2022-08-09 PROCEDURE — 80053 COMPREHEN METABOLIC PANEL: CPT | Performed by: NURSE PRACTITIONER

## 2022-08-09 PROCEDURE — 3062F PR POS MACROALBUMINURIA RESULT DOCUMENTED/REVIEW: ICD-10-PCS | Mod: CPTII,S$GLB,, | Performed by: NURSE PRACTITIONER

## 2022-08-09 PROCEDURE — 3075F SYST BP GE 130 - 139MM HG: CPT | Mod: CPTII,S$GLB,, | Performed by: NURSE PRACTITIONER

## 2022-08-09 PROCEDURE — 3066F NEPHROPATHY DOC TX: CPT | Mod: CPTII,S$GLB,, | Performed by: NURSE PRACTITIONER

## 2022-08-09 PROCEDURE — 3079F PR MOST RECENT DIASTOLIC BLOOD PRESSURE 80-89 MM HG: ICD-10-PCS | Mod: CPTII,S$GLB,, | Performed by: NURSE PRACTITIONER

## 2022-08-09 PROCEDURE — 99999 PR PBB SHADOW E&M-EST. PATIENT-LVL V: CPT | Mod: PBBFAC,,, | Performed by: NURSE PRACTITIONER

## 2022-08-09 RX ORDER — DAPAGLIFLOZIN 5 MG/1
5 TABLET, FILM COATED ORAL DAILY
Qty: 30 TABLET | Refills: 4 | Status: SHIPPED | OUTPATIENT
Start: 2022-08-09 | End: 2023-03-16 | Stop reason: SDUPTHER

## 2022-08-09 RX ORDER — INSULIN GLARGINE 100 [IU]/ML
50 INJECTION, SOLUTION SUBCUTANEOUS DAILY
Qty: 15 ML | Refills: 1 | Status: SHIPPED | OUTPATIENT
Start: 2022-08-09 | End: 2023-08-09

## 2022-08-09 NOTE — PROGRESS NOTES
"57 y.o. male here for 5 month followup for management of t2dm -   Last seen march 2022 - those notes below.     a1c is decreased from 12.2% to 10% -   However he reports his glucoses are in the 90 - 150's.   He checks his sugars 1 - 2 times per day.   His blood sugar is level of 99 and this is fasting -   Admits he is not eating "as much" - smaller portions.   Is trying to limit his carbs, but admits eating what he can "get" -   We have tried to get him on cgm therapy for the past 1 - 2 years, but never can get approved/can't afford.   He prefers to fingerstick.   He denies any known hypoglycemic episodes.   States bg's vary over the past month from 90 - 211 at the highest.     Is on insulin continuously via pump   Continues on metformin 1000 mg twice per day -   Also on trulicity 1.5mg SC every week -    Denies any side effects from it - he is having normal bm's, denies nausea/vomiting or abdominal pain.   No constipation -   He was estimated to have a high cost, however so stopped taking the trulicity for a couple months   Now the cost has gone down/He is actually getting from the pharmacy - and gets it for free from [facility].     Continues on [employer] 770 pump - downloaded and reviewed today -   See scanned in .   Using humalog in insulin pump -     In manual mode -   Total daily dose - 70.8 units/day -   64% is coming from basal rate.   36% is coming from bolusing -   Active insulin time is 4 hours.   He is giving/use presets - 10 or 15 units. Has a snack dose set for 5 units.    Basal rate is 1.9 units/hour -   Carb ratio of 1: 6 - however he is not carb counting   isf set at 25 -   bg target 110 - 120 -       Last OV from march 2022 as follows:   56 y.o. male here for 1 month follow up for management of T2dm -   Last seen 2/23/2022 - those notes are below.     a1c last @ 12.2%, however his bg's since last visit are coming down tremendously.   I began him on trulicity 0.75mg SC weekly " at his last visit - he gives every Thursday.   States his glucoses have come down into the 150's on average.   I submitted for the dexcom G6 - but it was going to be $900+ dollars through DME - so he didn't get/use.   He did try a sample florentino 2 - he liked using it - however didn't bring the reader with him today -   He would like a cgm going forward if possible.   For now, continues to fingerstick.     He continues on metformin 1000 bid.   trulicity 0.75 weekly.   Also on insulin pump and I adjusted settings on his pump at last visit.     medtronic 770 -   He remains in manual mode -   Total daily dose of insulin is 77.3 units/day.   59% is coming from the basal rate.   41% is coming from the bolus.   chaning his set every 3 days.   Active insulin time is at 4 hours.   Giving preset boluses -  5 units - snack.   10 units - regular meal.   15 units - medium meal.   He eats about 2 meals per day on average and snacks.   Sometimes bolusing just 2 times per day.     Settings:   Basal rate is at 1.9 units/hour.   Carb ratio is at 1: 56 -   ISF is at 1: 25   bg target is at 110 - 120.       He also has back up insulin - lantus for once per day.   Asking for refills for his bp and cholesterol meds.   Right foot ulcer - continues to heal.   Follows with podiatry - just started on cipro twice per day.       Last visit notes from 2/23/2022  56 y.o. gentleman, here for 2 week follow up for management of T2dm -   Last seen 2/9/2022 - those notes are below.     Historically uncontrolled - last a1c is @ 12.2%.   Had ulcers on foot, followed by staph infection, and then sustained a toe amputation.   On metformin 1000 bid.   I prescribed him farxiga - 5mg tablet daily - but he didn't start as the cost was too high.     Continues on medtronic 770 insulin pump - I had increased his rates at last visit -   He had been on novolin R in his pump as this was cheaper he found at [facility]. I switched him to humalog - gave voucher for $35  "through [employer] - however stated it was still too expensive.   As his sugars running continuously high - we made adjustments to his pump. He feels this has helped somewhat.     Pump downloaded today -   Reviewed - see scanned in  -   100% in manual mode   TDD is 59.4 units/day   68% coming from basal rate.   32% coming from bolusing.   He does not carb count.   Uses preset boluses 5 units for a snack, 10 units for meal time boluses.   He boluses anywhere from 1 - 2 times per day on average in looking at his report.     Settings:   Active insulin time is 4 hours.   Basal rate is at 1.8 units/hour.   Carb ratio is 1: 6   ISF is at 1: 25   bg target is 110 - 125     He does not use cgm - states has high deductible.   Had tried to order him a guardian cgm in the past - to be closed loop with his pump, but he never did purchase.   He had a florentino Pro placed in office in December 2020 but did not continue on or use a personal.   He checks his glucose by fingerstick.   No logs with him today.   His sugar today in clinic is at 355. I showed him how to give a correction bolus on his pump and we walked through to use the bolus wizard together.   I had ordered him a dexcom at last visit however he never got b/c the cost was too high.   (it was $700 so he didn't  ).         Last visit notes from 2/9/2022 as follows:   56 y.o. male here for 1.5 year follow up -   Last seen 12/2020 - those notes are below.   That was our initial consult, and then he followed with diabetes education - has been lost to follow since.     a1c now up from 10.5% ---> 12.2%.   Historically uncontrolled glucoses and diabetes -   States "I feel great!"   However admits "feels tired just when he works long hours/shifts" - states he is working almost 7 days per week.   Lives at home with his wife.   Reports staph infection in his left foot worsened, - had to amputate his large toe on his left toe and 2nd bone.   States this occurred in July " "2021 -     Current meds:   Metformin 100 bid.   Had prescribed him jardiance - but he never took  - states the cost had been too much.   He was on MDI - and switched him to pump therapy -   (formerly on lantus 60 every HS and novolin R 25 units tid ac meals).     He began on medtronic pump shortly after I saw him - but then never followed up.   He is on medtronic 770 pump - his insurance has high cost for humalog or novolog.   He is on novolin R in his insulin pump. Found cheaper at [facility] by the vial.   He often does not bolus with meals - only 1 - 2 times per week -   He eats however 5 times per day - 3 meals and 2 snacks -   Has never carb counted before - but admits he usually just "estimates" how much insulin he needs.   (usually just 6 - 7 units with meals).     medtronic pump downloaded and reviewed today -   Insulin rate is at 1.5 units/hour -   Carb ratio is at 1: 6 - but he does not input carbs.   ISF 25 -   bg target 110 - 120   Manual mode is 100%   Average bg is 308 -   Total daily dose is 40 units   85% is coming from basal rate.   15% is coming from bolusing.   Active insulin time is set for 4 hours.     Checking sugars with a glucometer - Relion meter - doesn't have his meter with him.   Has never used cgm  Therapy.   I had ordered guardian for him to go with his pump - but never was covered or cost was too much for him.   Last checked glucose yesterday.   Had small piece of lasagna for lunch - states gave 6 units of insulin   No acute changes in physical complaints.   Has seen eye doctor - states no acute changes in vision - still wears glasses -       Last visit notes from 12/2020:   HPI: Billy Sheffield Miguel is a 57 y.o.  male c/I for visit to address Diabetes Type 2  This is the first time I am seeing him for care, follows with Dr. BRAD Baker MD for primary care needs.   Recently has switched to seeing dr. Foreman for primary care needs.   Has not seen endocrinology or diabetes specialist in " the past.     He had recent covid 19 infection, but this has since resolved.   He was diagnosed with T2DM about 20 years ago  Family history - both sides strong history.   Has never been hospitalized r/t DM.  Denies missing doses of DM medication.     Current a1c @ 10.5%. Historically uncontrolled in past.   Current meds -   Metformin 1000 bid.   Began insulin shortly after his diagnosis.   lantus 55 units every night.   novolin R ac meals - 25 units tid.  (previously on novolog but insurance stopped covering).   Tried trulicity in his past, but caused severe constipation. Does not really want to try drug class again for that reason.   Has never tried SGLT2i's.     He is feeling well, does not feel when his sugars are high or low.  Does not titrate his insulin based on glucose readings. Generally always gives the full 25 units as his sugars just remain high regardless  Of what he eats or what he does.   He admits not always following a diabetic diet, finds it hard to eliminate carbs or limit them.     Complications from diabetes -   +Retinopathy.   + CV disease   + history of MI, Nstemi in May 2013. Got stent placed.   -nephropathy.   Large foot ulcer on left foot - receiving IV antibiotics via right upper arm picc line.   In boot with wound vac. Following with podiatry.        Past medical History:   Past Medical History:   Diagnosis Date    Allergy     CAD (coronary artery disease), native coronary artery 6/25/2013    Cancer     COVID-19 virus detected 9/1/2020    Diabetes mellitus     Diabetes mellitus, type 2     Disorder of kidney and ureter     Heart attack 04/2012    Hx of colon cancer, stage I     Hyperlipidemia     Hypertension     Muscular pain     post-op after colonoscopy    NSTEMI May 2013 - peak troponin 0.22 5/22/2013    MICHAELA (obstructive sleep apnea)     Retinopathy due to secondary diabetes       Family hx:   Family History   Problem Relation Age of Onset    Heart disease Mother      Diabetes Mother     Diabetes Father     Heart disease Brother     Diabetes Maternal Grandmother     Diabetes Maternal Grandfather     Diabetes Paternal Grandmother     Diabetes Paternal Grandfather     Anesthesia problems Neg Hx       Current meds:   Current Outpatient Medications:     acetaminophen (TYLENOL) 325 MG tablet, Take 325 mg by mouth every 6 (six) hours as needed for Pain., Disp: , Rfl:     ascorbic acid, vitamin C, (VITAMIN C) 250 MG tablet, Take 500 mg by mouth once daily., Disp: , Rfl:     aspirin (ECOTRIN) 81 MG EC tablet, Take 1 tablet (81 mg total) by mouth once daily., Disp: , Rfl: 0    aspirin/salicylamide/caffeine (BC HEADACHE POWDER ORAL), Take 1 Package by mouth daily as needed (pain)., Disp: , Rfl:     atorvastatin (LIPITOR) 80 MG tablet, Take 1 tablet (80 mg total) by mouth once daily. Take every night., Disp: 90 tablet, Rfl: 3    bismuth subsalicylate (PEPTO BISMOL) 262 mg/15 mL suspension, Take 15 mLs by mouth every other day. (As needed for diarrhea), Disp: , Rfl:     blood sugar diagnostic (ACCU-CHEK GUIDE TEST STRIPS) Strp, 1 each by Misc.(Non-Drug; Combo Route) route 5 (five) times daily., Disp: 150 each, Rfl: 11    carvediloL (COREG) 12.5 MG tablet, Take 1 tablet (12.5 mg total) by mouth 2 (two) times daily. (Patient taking differently: Take 12.5 mg by mouth once daily.), Disp: 180 tablet, Rfl: 3    doxycycline (VIBRA-TABS) 100 MG tablet, Take 1 tablet (100 mg total) by mouth 2 (two) times daily., Disp: 14 tablet, Rfl: 0    ibuprofen (ADVIL,MOTRIN) 200 MG tablet, Take 400-600 mg by mouth daily as needed for Pain., Disp: , Rfl:     insulin lispro (HUMALOG U-100 INSULIN) 100 unit/mL injection, Inject 100 Units into the skin continuous. Gives via insulin pump only. Do not Directly inject., Disp: 50 mL, Rfl: 11    lancets (ACCU-CHEK FASTCLIX LANCET DRUM) Misc, 1 each by Misc.(Non-Drug; Combo Route) route Daily., Disp: 30 each, Rfl: 11    lisinopriL (PRINIVIL,ZESTRIL) 40  "MG tablet, Take 1 tablet by mouth once daily, Disp: 30 tablet, Rfl: 0    metFORMIN (GLUCOPHAGE) 1000 MG tablet, Take 1 tablet (1,000 mg total) by mouth 2 (two) times daily with meals., Disp: 180 tablet, Rfl: 3    multivit-min/folic/vit K/lycop (MEN'S MULTIVITAMIN ORAL), Take 1 tablet by mouth once daily., Disp: , Rfl:     pen needle, diabetic 31 gauge x 3/16" Ndle, Inject 1 each into the skin 3 (three) times daily before meals. (Patient taking differently: Inject 1 each into the skin 4 (four) times daily.), Disp: 100 each, Rfl: 12    TRULICITY 1.5 mg/0.5 mL pen injector, Inject 1.5 mg into the skin every 7 days., Disp: 4 pen, Rfl: 6    dapagliflozin (FARXIGA) 5 mg Tab tablet, Take 1 tablet (5 mg total) by mouth once daily., Disp: 30 tablet, Rfl: 4    insulin (LANTUS SOLOSTAR U-100 INSULIN) glargine 100 units/mL SubQ pen, Inject 50 Units into the skin once daily. USE AS BACK UP INSULIN ONLY - EMERGENCY USE IF YOUR ARE OFF OF YOUR INSULIN PUMP, Disp: 15 mL, Rfl: 1    MINIMED 770G INSULIN PUMP Misc, 1 each by Misc.(Non-Drug; Combo Route) route continuous. Medically necessary for management of Type 2 diabetes, E11.65, Disp: 1 each, Rfl: 0     Current Diabetes medications:   novolin r via insulin pump (purchases from [employer]) --  Now has changed to humalog and got $35/month with voucher.   Uses in medtronic 770 pump   Metformin 1000 mg po bid with food  Trulicity 1.5mg sc every week.     Medications Tried and Failed:   trulicity - severe constipation   novolog - insurance wouldn't cover so switched to novolin r.  Long acting insulin: lantus 55 units every night.   Short acting insulin:  novolin r 25 units tid ac meals - takes 5 - 15 minutes prior to meals.   jardiance - was prescribed, but he never took as the cost was too much.   farxiga - was prescribed     Review of Pertinent co-morbidities/risk factors:   CV: + history of MI   CAD: yes nstemi with stent.   Now history of left foot toe amputation.   Takes " "aspirin 81mg tablet daily  BP: has history of HTN  Statin: Taking  ACE/ARB: Taking    Social History     Tobacco Use   Smoking Status Never Smoker   Smokeless Tobacco Never Used      Social:   Lives at home with: wife.   Life changes/stressors currently: has ulcer to left foot - so off work for now while this heals.   Diet:  Not always following ADA diet   Meals: 3 per day and snacks.        Breakfast - cornflakes with milk, eggs, oatmeal       Lunch - precooked meals from [employer]. (meat/veggie/small carb).        Dinner - hot plate meals from restaurants - meat/veggie/starch. Chicken and vegetables, beef, occasionally rice.   Spaghetti and meatballs.        Snacks - doritos bags, cheetos, kind bar reduced sugar.        Drinks - diet tea, milk, diet lemonade, diet coke, water.   Exercise: nothing formal, but normally walking a lot in his work.   Activities: was working full time as  at [employer]. No longer working.     Glucose Monitoring:   Checking sugars 4x/day by fingerstick.  Has relion meter.    CGM - has never used, Free style florentino and dexcom are both excluded from his insurance plan in the past -   Had prescribed guardian cgm - but never was covered.     Standards of care:   Eyes: .: 08/15/2019  Foot exam: : 06/08/2022   Diabetes education: yes - on start of insulin pump therapy.     Vital Signs  /80 (BP Location: Right arm, Patient Position: Sitting, BP Method: Large (Manual))   Pulse 62   Temp 98.1 °F (36.7 °C) (Temporal)   Resp 14   Ht 6' 2" (1.88 m)   Wt 128.5 kg (283 lb 4.7 oz)   SpO2 97%   BMI 36.37 kg/m²     Pertinent Labs:   Hgba1c   Lab Results   Component Value Date    HGBA1C 10.0 (H) 06/08/2022    HGBA1C 12.2 (H) 01/26/2022    HGBA1C 10.5 (H) 12/22/2020     Lipid panel   Lab Results   Component Value Date    CHOL 175 01/26/2022    CHOL 144 08/12/2020    CHOL 172 08/07/2019     Lab Results   Component Value Date    HDL 43 01/26/2022    HDL 39 (L) 08/12/2020    HDL 40 " 08/07/2019     Lab Results   Component Value Date    LDLCALC 93.2 01/26/2022    LDLCALC 66.6 08/12/2020    LDLCALC 100.8 08/07/2019     Lab Results   Component Value Date    TRIG 194 (H) 01/26/2022    TRIG 192 (H) 08/12/2020    TRIG 156 (H) 08/07/2019     Lab Results   Component Value Date    CHOLHDL 24.6 01/26/2022    CHOLHDL 27.1 08/12/2020    CHOLHDL 23.3 08/07/2019      CMP  Glucose   Date Value Ref Range Status   06/10/2022 298 (H) 70 - 110 mg/dL Final     BUN   Date Value Ref Range Status   06/10/2022 16 6 - 20 mg/dL Final     Creatinine   Date Value Ref Range Status   06/10/2022 0.9 0.5 - 1.4 mg/dL Final     eGFR if    Date Value Ref Range Status   06/10/2022 >60.0 >60 mL/min/1.73 m^2 Final     eGFR if non    Date Value Ref Range Status   06/10/2022 >60.0 >60 mL/min/1.73 m^2 Final     Comment:     Calculation used to obtain the estimated glomerular filtration  rate (eGFR) is the CKD-EPI equation.        AST   Date Value Ref Range Status   06/10/2022 14 10 - 40 U/L Final     ALT   Date Value Ref Range Status   06/10/2022 16 10 - 44 U/L Final     Microalbumin creatinine ratio:   Lab Results   Component Value Date    MICALBCREAT 415.8 (H) 01/26/2022       Review Of Systems:   Gen: Appetite good, no weight loss or gain,  No fatigue. Denies polydipsia.  Skin: Skin is intact and heals well, denies any rashes or hair changes.   EENT: Denies any acute visual disturbances, nor blurred vision.   Resp: Denies SOB or Dyspnea on exertion, denies cough.   Cardiac: Denies chest pain, palpitations, or swelling.   GI: Denies abdominal pain, nausea or vomiting, diarrhea, or constipation.   /GYN: + nocturia several times per night, however denies burning, frequency or pain on urination.  MS/Neuro: Denies numbness/ tingling in BLE; unsteady Gait, uses boot.   speech clear  Psych: Denies drug/ETOH abuse, no hx of depression.  Other systems: negative.    Physical Exam:   GENERAL: Well developed,  well nourished in appearance.   PSYCH: AAOx3, appropriate mood and affect, pleasant expression, conversant, appears relaxed, well groomed.   EYES: PERRL, Conjunctiva and corneas clear  NECK: Soft and Supple, trachea midline  CHEST: Even, regular, and unlabored respirations  ABDOMEN: Soft, non-tender, non-distended. Normal bowel sounds.   VASCULAR: pedal pulses palpable bilaterally, no edema.  NEURO:  cranial nerves II - XII intact   MUSCULOSKELETAL: Good ROM,  unsteady gait. Due to boot on right foot.   SKIN: Skin warm, dry, and intact   FEET: right foot in boot - amputation on left foot/large toe.     Assessment and Plan of Care:     Billy was seen today for diabetes and follow-up.    Diagnoses and all orders for this visit:    Type 2 diabetes mellitus 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, with long-term current use of insulin  -     POCT Glucose, Hand-Held Device  -     Hemoglobin A1C; Future  -     Comprehensive Metabolic Panel; Future  -     Hemoglobin A1C; Future  -     Lipid Panel; Future  -     dapagliflozin (FARXIGA) 5 mg Tab tablet; Take 1 tablet (5 mg total) by mouth once daily.  -     insulin (LANTUS SOLOSTAR U-100 INSULIN) glargine 100 units/mL SubQ pen; Inject 50 Units into the skin once daily. USE AS BACK UP INSULIN ONLY - EMERGENCY USE IF YOUR ARE OFF OF YOUR INSULIN PUMP    Hypertension associated with diabetes  -     Microalbumin/Creatinine Ratio, Urine; Future  -     Comprehensive Metabolic Panel; Future    Dyslipidemia associated with type 2 diabetes mellitus    Coronary artery disease involving native coronary artery of native heart without angina pectoris    Type 2 diabetes mellitus with diabetic peripheral angiopathy without gangrene, with long-term current use of insulin    Insulin pump in place    Class 2 severe obesity with body mass index (BMI) of 35 to 39.9 with serious comorbidity    Amputation of toe of left foot       1. T2DM with  hyperglycemia- Hgba1c goal is 7.5% or less without hypoglycemia - 11.5%--> 10.8%--> 10.5%--> 12.2% -->10%    discussed DM, progression of disease, long term complications, CV risk factors and tx options.   Has already had MI in past.   Continue trulicity - 1.5mg every week.   Continue metformin 1000 bid.   Start farxiga 5mg tablet daily - he has right toe pinky amputation, and left large toe amputation - will use caution -   Will decrease insulin dose preventatively as we are starting sglt2i.   Continue on [employer] 770 insulin pump - increased presets from 10 units with meals to 15 units with meals.   We have tried for cgm but cost has been high.   Encouraged to bolus for meals -   Entered in presets for him at last visit -   5 units for a snack.   10 or 15 units for medium or large meal.   Advised on over bolusing/correcting with just 5 units.   Advised to please avoid fast food, chips, processed foods.   Increased basal rate from 1.5 to 1.8 u/hour --->1.9 units/hour at last visit.   Decreased rate to 1.6 units/hour today as he is having sugars in the 90's and higher on basal rate, and I am starting him on farxiga 5mg tablet and I anticipate.   Reviewed back up plan - lantus 50 units daily  Patient likes to talk/text/use his smart phone so looking forward to insulin pump and getting sugars under control.   No history of pancreatitis or medullary thyroid cancer.   May plan to increase dose of trulicity if tolerated well.   Notify me if side effects such as severe abdominal pain, nausea, diarrhea.   Advise compliance with ADA diet and encourage exercise  Reviewed  hypoglycemia, s/s and appropriate tx. Have/get quick acting glucose tablets at hand.   Instructed to monitor Blood glucose 2 - 4x/day and bring meter/ log to every clinic visit.       2. HTN - controlled, continue meds as previously prescribed and monitor.   Coreg 12.5 po bid.   Lisinopril 40.   Urine mac + 202 ---> 415 - start sglt2i.     3. HLD - LDL  goal < 100. He is at goal.   Currently on statin therapy- lipitor 80 mg every HS.   History of MI/nstemi.   On aspirin daily.     4. Weight - BMI Body mass index is 36.37 kg/m².   Encourage Ada diet and exercise.   Continue glp1 - increase trulicity from 0.75 to 1.5mg every week.     5. Renal Function - stable. No history of nephropathy.   Has clinical urine mac +   Starting low dose farxiga.     6. Infections/s/p amputationss to toes ---> - now resulted in amputation - two toes  advised to gain compliance and not miss meal time boluses.   The high sugars are responsible culprit for the infection.   Following with podiatry.   I am starting farxiga but using with caution b/c of the history of amputations.       Start farxiga 5mg tablet daily - voucher given.   Decreased basal rate.   Advised to call me if any lows.

## 2022-08-09 NOTE — PATIENT INSTRUCTIONS
Continue metformin 1000mg twice per day -   Continue Trulicity 1.5mg SC every week.   Start farxiga 5mg tablet once per day in morning time.     Stay hydrated.   1 green vegetable per day  Diabetic diet.   Limit processed foods.     Continue on your insulin pump - I have decreased your insulin rate lower by about 15% as we are starting you on farxiga and hopeful that this will lower your blood sugar further and decrease your need for insulin.     For low blood sugar - remember to keep glucose tablets on hand. You can purchase these at the pharmacy check out desk/over the counter.   If blood sugar is less than 70, take/eat 2 - 3 tablets quickly to bring your sugar up.   Always keep 15 grams of Quick acting carbohydrates on hand to eat/drink if your sugar is low - examples are 1/2 cup of juice, coke, or crackers, granola bars.   Remember to eat meals frequently to prevent low blood blood sugars.      Insulin back up - if your pump should fail, you can give Lantus once per day until you get back on your pump - 50 units once per day -

## 2022-08-10 ENCOUNTER — OFFICE VISIT (OUTPATIENT)
Dept: PODIATRY | Facility: CLINIC | Age: 57
End: 2022-08-10
Payer: COMMERCIAL

## 2022-08-10 VITALS
HEART RATE: 60 BPM | WEIGHT: 280.63 LBS | RESPIRATION RATE: 16 BRPM | SYSTOLIC BLOOD PRESSURE: 159 MMHG | HEIGHT: 74 IN | BODY MASS INDEX: 36.01 KG/M2 | DIASTOLIC BLOOD PRESSURE: 80 MMHG

## 2022-08-10 DIAGNOSIS — L97.512 FOOT ULCER, RIGHT, WITH FAT LAYER EXPOSED: ICD-10-CM

## 2022-08-10 DIAGNOSIS — L97.522 FOOT ULCER, LEFT, WITH FAT LAYER EXPOSED: ICD-10-CM

## 2022-08-10 DIAGNOSIS — E11.49 TYPE II DIABETES MELLITUS WITH NEUROLOGICAL MANIFESTATIONS: Primary | ICD-10-CM

## 2022-08-10 PROCEDURE — 3008F PR BODY MASS INDEX (BMI) DOCUMENTED: ICD-10-PCS | Mod: CPTII,S$GLB,, | Performed by: PODIATRIST

## 2022-08-10 PROCEDURE — 99999 PR PBB SHADOW E&M-EST. PATIENT-LVL IV: ICD-10-PCS | Mod: PBBFAC,,, | Performed by: PODIATRIST

## 2022-08-10 PROCEDURE — 1159F MED LIST DOCD IN RCRD: CPT | Mod: CPTII,S$GLB,, | Performed by: PODIATRIST

## 2022-08-10 PROCEDURE — 11042 PR DEBRIDEMENT, SKIN, SUB-Q TISSUE,=<20 SQ CM: ICD-10-PCS | Mod: 79,S$GLB,, | Performed by: PODIATRIST

## 2022-08-10 PROCEDURE — 3077F PR MOST RECENT SYSTOLIC BLOOD PRESSURE >= 140 MM HG: ICD-10-PCS | Mod: CPTII,S$GLB,, | Performed by: PODIATRIST

## 2022-08-10 PROCEDURE — 3062F PR POS MACROALBUMINURIA RESULT DOCUMENTED/REVIEW: ICD-10-PCS | Mod: CPTII,S$GLB,, | Performed by: PODIATRIST

## 2022-08-10 PROCEDURE — 3008F BODY MASS INDEX DOCD: CPT | Mod: CPTII,S$GLB,, | Performed by: PODIATRIST

## 2022-08-10 PROCEDURE — 1159F PR MEDICATION LIST DOCUMENTED IN MEDICAL RECORD: ICD-10-PCS | Mod: CPTII,S$GLB,, | Performed by: PODIATRIST

## 2022-08-10 PROCEDURE — 99024 PR POST-OP FOLLOW-UP VISIT: ICD-10-PCS | Mod: S$GLB,,, | Performed by: PODIATRIST

## 2022-08-10 PROCEDURE — 3052F PR MOST RECENT HEMOGLOBIN A1C LEVEL 8.0 - < 9.0%: ICD-10-PCS | Mod: CPTII,S$GLB,, | Performed by: PODIATRIST

## 2022-08-10 PROCEDURE — 3079F DIAST BP 80-89 MM HG: CPT | Mod: CPTII,S$GLB,, | Performed by: PODIATRIST

## 2022-08-10 PROCEDURE — 3066F PR DOCUMENTATION OF TREATMENT FOR NEPHROPATHY: ICD-10-PCS | Mod: CPTII,S$GLB,, | Performed by: PODIATRIST

## 2022-08-10 PROCEDURE — 4010F ACE/ARB THERAPY RXD/TAKEN: CPT | Mod: CPTII,S$GLB,, | Performed by: PODIATRIST

## 2022-08-10 PROCEDURE — 3062F POS MACROALBUMINURIA REV: CPT | Mod: CPTII,S$GLB,, | Performed by: PODIATRIST

## 2022-08-10 PROCEDURE — 3066F NEPHROPATHY DOC TX: CPT | Mod: CPTII,S$GLB,, | Performed by: PODIATRIST

## 2022-08-10 PROCEDURE — 99212 PR OFFICE/OUTPT VISIT, EST, LEVL II, 10-19 MIN: ICD-10-PCS | Mod: 24,25,S$GLB, | Performed by: PODIATRIST

## 2022-08-10 PROCEDURE — 3052F HG A1C>EQUAL 8.0%<EQUAL 9.0%: CPT | Mod: CPTII,S$GLB,, | Performed by: PODIATRIST

## 2022-08-10 PROCEDURE — 4010F PR ACE/ARB THEARPY RXD/TAKEN: ICD-10-PCS | Mod: CPTII,S$GLB,, | Performed by: PODIATRIST

## 2022-08-10 PROCEDURE — 3077F SYST BP >= 140 MM HG: CPT | Mod: CPTII,S$GLB,, | Performed by: PODIATRIST

## 2022-08-10 PROCEDURE — 11042 DBRDMT SUBQ TIS 1ST 20SQCM/<: CPT | Mod: 79,S$GLB,, | Performed by: PODIATRIST

## 2022-08-10 PROCEDURE — 99024 POSTOP FOLLOW-UP VISIT: CPT | Mod: S$GLB,,, | Performed by: PODIATRIST

## 2022-08-10 PROCEDURE — 99999 PR PBB SHADOW E&M-EST. PATIENT-LVL IV: CPT | Mod: PBBFAC,,, | Performed by: PODIATRIST

## 2022-08-10 PROCEDURE — 3079F PR MOST RECENT DIASTOLIC BLOOD PRESSURE 80-89 MM HG: ICD-10-PCS | Mod: CPTII,S$GLB,, | Performed by: PODIATRIST

## 2022-08-10 PROCEDURE — 99212 OFFICE O/P EST SF 10 MIN: CPT | Mod: 24,25,S$GLB, | Performed by: PODIATRIST

## 2022-08-14 NOTE — PROGRESS NOTES
Subjective:      Patient ID: Billy Rivera is a 57 y.o. male.    Chief Complaint: No chief complaint on file.    Billy is a 57 y.o. male who presents to the clinic for evaluation and treatment of high risk feet. Billy has a past medical history of Allergy, CAD (coronary artery disease), native coronary artery (6/25/2013), Cancer, COVID-19 virus detected (9/1/2020), Diabetes mellitus, Diabetes mellitus, type 2, Disorder of kidney and ureter, Heart attack (04/2012), colon cancer, stage I, Hyperlipidemia, Hypertension, Muscular pain, NSTEMI May 2013 - peak troponin 0.22 (5/22/2013), MICHAELA (obstructive sleep apnea), and Retinopathy due to secondary diabetes. The patient's chief complaint is foot ulcer both feet. Recently had R 5th ray amp with subsequent dehiscence and wound along incision. No new concerns.  Doing well after wound closure, follow-up wound care.      PCP: BRAD Baker MD    Date Last Seen by PCP: 3/23/2022    Current shoe gear:  Affected Foot: DAR[employer] Shoe right foot     Affected Foot: Tennis shoe left foot    History of Trauma: negative  Sign of Infection: none    Hemoglobin A1C   Date Value Ref Range Status   08/09/2022 8.0 (H) 4.0 - 5.6 % Final     Comment:     ADA Screening Guidelines:  5.7-6.4%  Consistent with prediabetes  >or=6.5%  Consistent with diabetes    High levels of fetal hemoglobin interfere with the HbA1C  assay. Heterozygous hemoglobin variants (HbS, HgC, etc)do  not significantly interfere with this assay.   However, presence of multiple variants may affect accuracy.     06/08/2022 10.0 (H) 4.0 - 5.6 % Final     Comment:     ADA Screening Guidelines:  5.7-6.4%  Consistent with prediabetes  >or=6.5%  Consistent with diabetes    High levels of fetal hemoglobin interfere with the HbA1C  assay. Heterozygous hemoglobin variants (HbS, HgC, etc)do  not significantly interfere with this assay.   However, presence of multiple variants may affect accuracy.     01/26/2022 12.2 (H)  4.0 - 5.6 % Final     Comment:     ADA Screening Guidelines:  5.7-6.4%  Consistent with prediabetes  >or=6.5%  Consistent with diabetes    High levels of fetal hemoglobin interfere with the HbA1C  assay. Heterozygous hemoglobin variants (HbS, HgC, etc)do  not significantly interfere with this assay.   However, presence of multiple variants may affect accuracy.         Review of Systems   Constitutional: Negative for chills, decreased appetite, fever, malaise/fatigue and night sweats.   HENT: Negative for congestion, hearing loss, nosebleeds and tinnitus.    Eyes: Negative for double vision, pain, photophobia and visual disturbance.   Cardiovascular: Negative for chest pain, claudication, cyanosis and leg swelling.   Respiratory: Negative for cough, hemoptysis, shortness of breath and wheezing.    Endocrine: Negative for cold intolerance and heat intolerance.   Hematologic/Lymphatic: Negative for adenopathy and bleeding problem.   Skin: Positive for poor wound healing. Negative for color change, dry skin, itching, nail changes, rash and suspicious lesions.   Musculoskeletal: Negative for arthritis, falls, gout, joint pain, myalgias and stiffness.   Gastrointestinal: Negative for abdominal pain, jaundice, nausea and vomiting.   Genitourinary: Negative for dysuria, frequency and hematuria.   Neurological: Positive for numbness and sensory change. Negative for difficulty with concentration, loss of balance and paresthesias.   Psychiatric/Behavioral: Negative for altered mental status, hallucinations and suicidal ideas. The patient is not nervous/anxious.    Allergic/Immunologic: Negative for environmental allergies and persistent infections.           Objective:        Physical Exam  Constitutional:       General: He is not in acute distress.     Appearance: Normal appearance.   Cardiovascular:      Pulses:           Dorsalis pedis pulses are 2+ on the right side and 2+ on the left side.        Posterior tibial pulses  are 2+ on the right side and 2+ on the left side.      Comments: CFT< 3 secs all toes bilateral foot, skin temp warm to cool bilateral foot, no hair growth bilateral lower extremity, no edema to bilateral lower extremities    Musculoskeletal:         General: No swelling.      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     5/5 muscle strength with DF/PF/Inv/Ev bilateral.    Inspection and palpation of the joints and bones reveal no crepitus or joint effusion. No tenderness upon palpation.  Angle and base of gait are normal.    Skin:     General: Skin is warm and dry.      Capillary Refill: Capillary refill takes 2 to 3 seconds.      Findings: No erythema.      Comments: Ulceration location: left foot plantar 1st ray amputation stump reopened  Pre debridement Measurement 0.5 x 0.2 x 0.1cm post debridement measurement 0.7 x 0.2 x 0.1 cm  Signs of infection: none.   No malodor, no erythema  Drainage: Serosanguinous  Purulence: no  Crepitus/fluctuance: no  Periwound: Macerated, Calloused  Base: Granular  Depth: subcutaneous tissue  Probe to bone: no    Dorsal lateral R 5th ray amputation site, healing well.      All wounds with granular base with overlying biofilm.    Neurological:      Mental Status: He is alert and oriented to person, place, and time.      Sensory: Sensory deficit present.      Motor: No weakness.      Comments: Diminished/loss of protective sensation all toes bilateral to 10 gram monofilament.   Psychiatric:         Mood and Affect: Mood normal.         Thought Content: Thought content normal.               Assessment:       Encounter Diagnoses   Name Primary?    Type II diabetes mellitus with neurological manifestations Yes    Foot ulcer, left, with fat layer exposed     Foot ulcer, right, with fat layer exposed          Plan:       Diagnoses and all orders for this visit:    Type II diabetes mellitus with neurological manifestations    Foot ulcer,  "left, with fat layer exposed    Foot ulcer, right, with fat layer exposed      I counseled the patient on his conditions, their implications and medical management.    Wound Debridement    Performed by: Mingo Melara DPM   Authorized by: Patient    Time out: Immediately prior to procedure a "time out" was called to verify the correct patient, procedure, equipment, support staff and site/side marked as required.   Consent Done?:  Yes (Verbal)  Local anesthesia used?: No       Wound Details:    Location:   plantar left foot     Type of Debridement:  Excisional       Length (cm):  0.7       Width (cm):   0.2       Depth (cm):   0.1       Percent Debrided (%):  100             Depth of debridement:  Subcutaneous tissue    Tissue debrided:  Dermis, Epidermis and Subcutaneous    Devitalized tissue debrided:  Biofilm, Callus and Necrotic/Eschar    Instruments:  Blade, Curette and Nippers     Bleeding:  Minimal  Hemostasis Achieved: Yes    Method Used:  Pressure  Patient tolerance:  Patient tolerated the procedure well with no immediate complications      Football dressing applied to the left, wound care discussed in detail.  Follow-up in 1 week.      "

## 2022-08-23 ENCOUNTER — OFFICE VISIT (OUTPATIENT)
Dept: PODIATRY | Facility: CLINIC | Age: 57
End: 2022-08-23
Payer: COMMERCIAL

## 2022-08-23 VITALS
SYSTOLIC BLOOD PRESSURE: 99 MMHG | HEART RATE: 60 BPM | WEIGHT: 280.63 LBS | DIASTOLIC BLOOD PRESSURE: 72 MMHG | BODY MASS INDEX: 36.03 KG/M2

## 2022-08-23 DIAGNOSIS — E11.49 TYPE II DIABETES MELLITUS WITH NEUROLOGICAL MANIFESTATIONS: Primary | ICD-10-CM

## 2022-08-23 DIAGNOSIS — L97.522 FOOT ULCER, LEFT, WITH FAT LAYER EXPOSED: ICD-10-CM

## 2022-08-23 DIAGNOSIS — L97.512 FOOT ULCER, RIGHT, WITH FAT LAYER EXPOSED: ICD-10-CM

## 2022-08-23 PROCEDURE — 3052F PR MOST RECENT HEMOGLOBIN A1C LEVEL 8.0 - < 9.0%: ICD-10-PCS | Mod: CPTII,S$GLB,, | Performed by: PODIATRIST

## 2022-08-23 PROCEDURE — 3078F DIAST BP <80 MM HG: CPT | Mod: CPTII,S$GLB,, | Performed by: PODIATRIST

## 2022-08-23 PROCEDURE — 3078F PR MOST RECENT DIASTOLIC BLOOD PRESSURE < 80 MM HG: ICD-10-PCS | Mod: CPTII,S$GLB,, | Performed by: PODIATRIST

## 2022-08-23 PROCEDURE — 3066F NEPHROPATHY DOC TX: CPT | Mod: CPTII,S$GLB,, | Performed by: PODIATRIST

## 2022-08-23 PROCEDURE — 4010F PR ACE/ARB THEARPY RXD/TAKEN: ICD-10-PCS | Mod: CPTII,S$GLB,, | Performed by: PODIATRIST

## 2022-08-23 PROCEDURE — 3066F PR DOCUMENTATION OF TREATMENT FOR NEPHROPATHY: ICD-10-PCS | Mod: CPTII,S$GLB,, | Performed by: PODIATRIST

## 2022-08-23 PROCEDURE — 3074F SYST BP LT 130 MM HG: CPT | Mod: CPTII,S$GLB,, | Performed by: PODIATRIST

## 2022-08-23 PROCEDURE — 1159F PR MEDICATION LIST DOCUMENTED IN MEDICAL RECORD: ICD-10-PCS | Mod: CPTII,S$GLB,, | Performed by: PODIATRIST

## 2022-08-23 PROCEDURE — 99213 PR OFFICE/OUTPT VISIT, EST, LEVL III, 20-29 MIN: ICD-10-PCS | Mod: 25,S$GLB,, | Performed by: PODIATRIST

## 2022-08-23 PROCEDURE — 3052F HG A1C>EQUAL 8.0%<EQUAL 9.0%: CPT | Mod: CPTII,S$GLB,, | Performed by: PODIATRIST

## 2022-08-23 PROCEDURE — 99999 PR PBB SHADOW E&M-EST. PATIENT-LVL III: ICD-10-PCS | Mod: PBBFAC,,, | Performed by: PODIATRIST

## 2022-08-23 PROCEDURE — 3008F PR BODY MASS INDEX (BMI) DOCUMENTED: ICD-10-PCS | Mod: CPTII,S$GLB,, | Performed by: PODIATRIST

## 2022-08-23 PROCEDURE — 99213 OFFICE O/P EST LOW 20 MIN: CPT | Mod: 25,S$GLB,, | Performed by: PODIATRIST

## 2022-08-23 PROCEDURE — 1159F MED LIST DOCD IN RCRD: CPT | Mod: CPTII,S$GLB,, | Performed by: PODIATRIST

## 2022-08-23 PROCEDURE — 3074F PR MOST RECENT SYSTOLIC BLOOD PRESSURE < 130 MM HG: ICD-10-PCS | Mod: CPTII,S$GLB,, | Performed by: PODIATRIST

## 2022-08-23 PROCEDURE — 4010F ACE/ARB THERAPY RXD/TAKEN: CPT | Mod: CPTII,S$GLB,, | Performed by: PODIATRIST

## 2022-08-23 PROCEDURE — 99999 PR PBB SHADOW E&M-EST. PATIENT-LVL III: CPT | Mod: PBBFAC,,, | Performed by: PODIATRIST

## 2022-08-23 PROCEDURE — 3062F PR POS MACROALBUMINURIA RESULT DOCUMENTED/REVIEW: ICD-10-PCS | Mod: CPTII,S$GLB,, | Performed by: PODIATRIST

## 2022-08-23 PROCEDURE — 3062F POS MACROALBUMINURIA REV: CPT | Mod: CPTII,S$GLB,, | Performed by: PODIATRIST

## 2022-08-23 PROCEDURE — 3008F BODY MASS INDEX DOCD: CPT | Mod: CPTII,S$GLB,, | Performed by: PODIATRIST

## 2022-08-29 ENCOUNTER — PATIENT OUTREACH (OUTPATIENT)
Dept: ADMINISTRATIVE | Facility: HOSPITAL | Age: 57
End: 2022-08-29
Payer: COMMERCIAL

## 2022-08-30 ENCOUNTER — OFFICE VISIT (OUTPATIENT)
Dept: PODIATRY | Facility: CLINIC | Age: 57
End: 2022-08-30
Payer: COMMERCIAL

## 2022-08-30 VITALS
DIASTOLIC BLOOD PRESSURE: 69 MMHG | HEART RATE: 69 BPM | BODY MASS INDEX: 36.03 KG/M2 | WEIGHT: 280.63 LBS | SYSTOLIC BLOOD PRESSURE: 114 MMHG

## 2022-08-30 DIAGNOSIS — E11.49 TYPE II DIABETES MELLITUS WITH NEUROLOGICAL MANIFESTATIONS: Primary | ICD-10-CM

## 2022-08-30 DIAGNOSIS — L97.512 FOOT ULCER, RIGHT, WITH FAT LAYER EXPOSED: ICD-10-CM

## 2022-08-30 DIAGNOSIS — L97.522 FOOT ULCER, LEFT, WITH FAT LAYER EXPOSED: ICD-10-CM

## 2022-08-30 PROCEDURE — 3008F PR BODY MASS INDEX (BMI) DOCUMENTED: ICD-10-PCS | Mod: CPTII,S$GLB,, | Performed by: PODIATRIST

## 2022-08-30 PROCEDURE — 3052F HG A1C>EQUAL 8.0%<EQUAL 9.0%: CPT | Mod: CPTII,S$GLB,, | Performed by: PODIATRIST

## 2022-08-30 PROCEDURE — 3008F BODY MASS INDEX DOCD: CPT | Mod: CPTII,S$GLB,, | Performed by: PODIATRIST

## 2022-08-30 PROCEDURE — 4010F ACE/ARB THERAPY RXD/TAKEN: CPT | Mod: CPTII,S$GLB,, | Performed by: PODIATRIST

## 2022-08-30 PROCEDURE — 4010F PR ACE/ARB THEARPY RXD/TAKEN: ICD-10-PCS | Mod: CPTII,S$GLB,, | Performed by: PODIATRIST

## 2022-08-30 PROCEDURE — 3066F NEPHROPATHY DOC TX: CPT | Mod: CPTII,S$GLB,, | Performed by: PODIATRIST

## 2022-08-30 PROCEDURE — 3062F POS MACROALBUMINURIA REV: CPT | Mod: CPTII,S$GLB,, | Performed by: PODIATRIST

## 2022-08-30 PROCEDURE — 3078F DIAST BP <80 MM HG: CPT | Mod: CPTII,S$GLB,, | Performed by: PODIATRIST

## 2022-08-30 PROCEDURE — 3066F PR DOCUMENTATION OF TREATMENT FOR NEPHROPATHY: ICD-10-PCS | Mod: CPTII,S$GLB,, | Performed by: PODIATRIST

## 2022-08-30 PROCEDURE — 3052F PR MOST RECENT HEMOGLOBIN A1C LEVEL 8.0 - < 9.0%: ICD-10-PCS | Mod: CPTII,S$GLB,, | Performed by: PODIATRIST

## 2022-08-30 PROCEDURE — 1159F PR MEDICATION LIST DOCUMENTED IN MEDICAL RECORD: ICD-10-PCS | Mod: CPTII,S$GLB,, | Performed by: PODIATRIST

## 2022-08-30 PROCEDURE — 3062F PR POS MACROALBUMINURIA RESULT DOCUMENTED/REVIEW: ICD-10-PCS | Mod: CPTII,S$GLB,, | Performed by: PODIATRIST

## 2022-08-30 PROCEDURE — 3078F PR MOST RECENT DIASTOLIC BLOOD PRESSURE < 80 MM HG: ICD-10-PCS | Mod: CPTII,S$GLB,, | Performed by: PODIATRIST

## 2022-08-30 PROCEDURE — 99024 POSTOP FOLLOW-UP VISIT: CPT | Mod: S$GLB,,, | Performed by: PODIATRIST

## 2022-08-30 PROCEDURE — 99999 PR PBB SHADOW E&M-EST. PATIENT-LVL III: CPT | Mod: PBBFAC,,, | Performed by: PODIATRIST

## 2022-08-30 PROCEDURE — 3074F SYST BP LT 130 MM HG: CPT | Mod: CPTII,S$GLB,, | Performed by: PODIATRIST

## 2022-08-30 PROCEDURE — 3074F PR MOST RECENT SYSTOLIC BLOOD PRESSURE < 130 MM HG: ICD-10-PCS | Mod: CPTII,S$GLB,, | Performed by: PODIATRIST

## 2022-08-30 PROCEDURE — 99024 PR POST-OP FOLLOW-UP VISIT: ICD-10-PCS | Mod: S$GLB,,, | Performed by: PODIATRIST

## 2022-08-30 PROCEDURE — 1159F MED LIST DOCD IN RCRD: CPT | Mod: CPTII,S$GLB,, | Performed by: PODIATRIST

## 2022-08-30 PROCEDURE — 99999 PR PBB SHADOW E&M-EST. PATIENT-LVL III: ICD-10-PCS | Mod: PBBFAC,,, | Performed by: PODIATRIST

## 2022-08-30 NOTE — PROGRESS NOTES
Subjective:      Patient ID: Billy Rivera is a 57 y.o. male.    Chief Complaint: Wound Care ([employer])    Billy is a 57 y.o. male who presents to the clinic for evaluation and treatment of high risk feet. Billy has a past medical history of Allergy, CAD (coronary artery disease), native coronary artery (6/25/2013), Cancer, COVID-19 virus detected (9/1/2020), Diabetes mellitus, Diabetes mellitus, type 2, Disorder of kidney and ureter, Heart attack (04/2012), colon cancer, stage I, Hyperlipidemia, Hypertension, Muscular pain, NSTEMI May 2013 - peak troponin 0.22 (5/22/2013), MICHAELA (obstructive sleep apnea), and Retinopathy due to secondary diabetes. The patient's chief complaint is foot ulcer both feet. Recently had R 5th ray amp with subsequent dehiscence and wound along incision. No new concerns.  Doing well after wound closure right foot, follow-up wound care.  Left foot is still open however right side is improving.      PCP: BRAD Baker MD    Date Last Seen by PCP: 3/23/2022    Current shoe gear:  Affected Foot: DARCO Shoe right foot     Affected Foot: Tennis shoe left foot    History of Trauma: negative  Sign of Infection: none    Hemoglobin A1C   Date Value Ref Range Status   08/09/2022 8.0 (H) 4.0 - 5.6 % Final     Comment:     ADA Screening Guidelines:  5.7-6.4%  Consistent with prediabetes  >or=6.5%  Consistent with diabetes    High levels of fetal hemoglobin interfere with the HbA1C  assay. Heterozygous hemoglobin variants (HbS, HgC, etc)do  not significantly interfere with this assay.   However, presence of multiple variants may affect accuracy.     06/08/2022 10.0 (H) 4.0 - 5.6 % Final     Comment:     ADA Screening Guidelines:  5.7-6.4%  Consistent with prediabetes  >or=6.5%  Consistent with diabetes    High levels of fetal hemoglobin interfere with the HbA1C  assay. Heterozygous hemoglobin variants (HbS, HgC, etc)do  not significantly interfere with this assay.   However,  presence of multiple variants may affect accuracy.     01/26/2022 12.2 (H) 4.0 - 5.6 % Final     Comment:     ADA Screening Guidelines:  5.7-6.4%  Consistent with prediabetes  >or=6.5%  Consistent with diabetes    High levels of fetal hemoglobin interfere with the HbA1C  assay. Heterozygous hemoglobin variants (HbS, HgC, etc)do  not significantly interfere with this assay.   However, presence of multiple variants may affect accuracy.         Review of Systems   Constitutional: Negative for chills, decreased appetite, fever, malaise/fatigue and night sweats.   HENT:  Negative for congestion, hearing loss, nosebleeds and tinnitus.    Eyes:  Negative for double vision, pain, photophobia and visual disturbance.   Cardiovascular:  Negative for chest pain, claudication, cyanosis and leg swelling.   Respiratory:  Negative for cough, hemoptysis, shortness of breath and wheezing.    Endocrine: Negative for cold intolerance and heat intolerance.   Hematologic/Lymphatic: Negative for adenopathy and bleeding problem.   Skin:  Positive for poor wound healing. Negative for color change, dry skin, itching, nail changes, rash and suspicious lesions.   Musculoskeletal:  Negative for arthritis, falls, gout, joint pain, myalgias and stiffness.   Gastrointestinal:  Negative for abdominal pain, jaundice, nausea and vomiting.   Genitourinary:  Negative for dysuria, frequency and hematuria.   Neurological:  Positive for numbness and sensory change. Negative for difficulty with concentration, loss of balance and paresthesias.   Psychiatric/Behavioral:  Negative for altered mental status, hallucinations and suicidal ideas. The patient is not nervous/anxious.    Allergic/Immunologic: Negative for environmental allergies and persistent infections.         Objective:        Physical Exam  Constitutional:       General: He is not in acute distress.     Appearance: Normal appearance.   Cardiovascular:      Pulses:           Dorsalis pedis pulses  are 2+ on the right side and 2+ on the left side.        Posterior tibial pulses are 2+ on the right side and 2+ on the left side.      Comments: CFT< 3 secs all toes bilateral foot, skin temp warm to cool bilateral foot, no hair growth bilateral lower extremity, no edema to bilateral lower extremities    Musculoskeletal:         General: No swelling.      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     5/5 muscle strength with DF/PF/Inv/Ev bilateral.    Inspection and palpation of the joints and bones reveal no crepitus or joint effusion. No tenderness upon palpation.  Angle and base of gait are normal.    Skin:     General: Skin is warm and dry.      Capillary Refill: Capillary refill takes 2 to 3 seconds.      Findings: No erythema.      Comments: Ulceration location: left foot plantar 1st ray amputation stump reopened  Pre debridement Measurement 0.4 x 0.2 x 0.1cm post debridement measurement 0.5 x 0.2 x 0.1 cm  Signs of infection: none.   No malodor, no erythema  Drainage: Serosanguinous  Purulence: no  Crepitus/fluctuance: no  Periwound: Macerated, Calloused  Base: Granular  Depth: subcutaneous tissue  Probe to bone: no    Dorsal lateral R 5th ray amputation site, healed.   Neurological:      Mental Status: He is alert and oriented to person, place, and time.      Sensory: Sensory deficit present.      Motor: No weakness.      Comments: Diminished/loss of protective sensation all toes bilateral to 10 gram monofilament.   Psychiatric:         Mood and Affect: Mood normal.         Thought Content: Thought content normal.             Assessment:       Encounter Diagnoses   Name Primary?    Type II diabetes mellitus with neurological manifestations Yes    Foot ulcer, left, with fat layer exposed     Foot ulcer, right, with fat layer exposed          Plan:       Billy was seen today for wound care.    Diagnoses and all orders for this visit:    Type II diabetes  "mellitus with neurological manifestations    Foot ulcer, left, with fat layer exposed    Foot ulcer, right, with fat layer exposed    I counseled the patient on his conditions, their implications and medical management.    Wound Debridement    Performed by: Mingo Melara DPM   Authorized by: Patient    Time out: Immediately prior to procedure a "time out" was called to verify the correct patient, procedure, equipment, support staff and site/side marked as required.   Consent Done?:  Yes (Verbal)  Local anesthesia used?: No       Wound Details:    Location:   plantar left foot     Type of Debridement:  Excisional       Length (cm):  0.5       Width (cm):   0.2       Depth (cm):   0.1       Percent Debrided (%):  100             Depth of debridement:  Subcutaneous tissue    Tissue debrided:  Dermis, Epidermis and Subcutaneous    Devitalized tissue debrided:  Biofilm, Callus and Necrotic/Eschar    Instruments:  Blade, Curette and Nippers     Bleeding:  Minimal  Hemostasis Achieved: Yes    Method Used:  Pressure  Patient tolerance:  Patient tolerated the procedure well with no immediate complications    The nature of the condition, options for management, as well as potential risks and complications were discussed in detail with patient. Patient was amenable to my recommendations and left my office fully informed and will follow up as instructed or sooner if necessary.      Football dressing applied to the left, wound care discussed in detail.  Follow-up in 1 week.        "

## 2022-09-07 NOTE — PROGRESS NOTES
Subjective:      Patient ID: Billy Rivera is a 57 y.o. male.    Chief Complaint: Wound Care and Diabetes Mellitus    Billy is a 57 y.o. male who presents to the clinic for evaluation and treatment of high risk feet. Billy has a past medical history of Allergy, CAD (coronary artery disease), native coronary artery (6/25/2013), Cancer, COVID-19 virus detected (9/1/2020), Diabetes mellitus, Diabetes mellitus, type 2, Disorder of kidney and ureter, Heart attack (04/2012), colon cancer, stage I, Hyperlipidemia, Hypertension, Muscular pain, NSTEMI May 2013 - peak troponin 0.22 (5/22/2013), MICHAELA (obstructive sleep apnea), and Retinopathy due to secondary diabetes. The patient's chief complaint is foot ulcer both feet.     PCP: BRAD Baker MD    Date Last Seen by PCP: 3/23/2022    Current shoe gear:  Affected Foot: [employer] Shoe right foot     Affected Foot: Tennis shoe left foot    History of Trauma: negative  Sign of Infection: none    Hemoglobin A1C   Date Value Ref Range Status   08/09/2022 8.0 (H) 4.0 - 5.6 % Final     Comment:     ADA Screening Guidelines:  5.7-6.4%  Consistent with prediabetes  >or=6.5%  Consistent with diabetes    High levels of fetal hemoglobin interfere with the HbA1C  assay. Heterozygous hemoglobin variants (HbS, HgC, etc)do  not significantly interfere with this assay.   However, presence of multiple variants may affect accuracy.     06/08/2022 10.0 (H) 4.0 - 5.6 % Final     Comment:     ADA Screening Guidelines:  5.7-6.4%  Consistent with prediabetes  >or=6.5%  Consistent with diabetes    High levels of fetal hemoglobin interfere with the HbA1C  assay. Heterozygous hemoglobin variants (HbS, HgC, etc)do  not significantly interfere with this assay.   However, presence of multiple variants may affect accuracy.     01/26/2022 12.2 (H) 4.0 - 5.6 % Final     Comment:     ADA Screening Guidelines:  5.7-6.4%  Consistent with prediabetes  >or=6.5%  Consistent with diabetes    High levels  of fetal hemoglobin interfere with the HbA1C  assay. Heterozygous hemoglobin variants (HbS, HgC, etc)do  not significantly interfere with this assay.   However, presence of multiple variants may affect accuracy.         Review of Systems   Constitutional: Negative for chills, decreased appetite, fever, malaise/fatigue and night sweats.   HENT:  Negative for congestion, hearing loss, nosebleeds and tinnitus.    Eyes:  Negative for double vision, pain, photophobia and visual disturbance.   Cardiovascular:  Negative for chest pain, claudication, cyanosis and leg swelling.   Respiratory:  Negative for cough, hemoptysis, shortness of breath and wheezing.    Endocrine: Negative for cold intolerance and heat intolerance.   Hematologic/Lymphatic: Negative for adenopathy and bleeding problem.   Skin:  Positive for poor wound healing. Negative for color change, dry skin, itching, nail changes, rash and suspicious lesions.   Musculoskeletal:  Negative for arthritis, falls, gout, joint pain, myalgias and stiffness.   Gastrointestinal:  Negative for abdominal pain, jaundice, nausea and vomiting.   Genitourinary:  Negative for dysuria, frequency and hematuria.   Neurological:  Positive for numbness and sensory change. Negative for difficulty with concentration, loss of balance and paresthesias.   Psychiatric/Behavioral:  Negative for altered mental status, hallucinations and suicidal ideas. The patient is not nervous/anxious.    Allergic/Immunologic: Negative for environmental allergies and persistent infections.         Objective:        Physical Exam  Constitutional:       General: He is not in acute distress.     Appearance: Normal appearance.   Cardiovascular:      Pulses:           Dorsalis pedis pulses are 2+ on the right side and 2+ on the left side.        Posterior tibial pulses are 2+ on the right side and 2+ on the left side.      Comments: CFT< 3 secs all toes bilateral foot, skin temp warm to cool bilateral foot, no  "hair growth bilateral lower extremity, no edema to bilateral lower extremities    Musculoskeletal:         General: No swelling.      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     5/5 muscle strength with DF/PF/Inv/Ev bilateral.    Inspection and palpation of the joints and bones reveal no crepitus or joint effusion. No tenderness upon palpation.  Angle and base of gait are normal.    Skin:     General: Skin is warm and dry.      Capillary Refill: Capillary refill takes 2 to 3 seconds.      Findings: No erythema.      Comments: Ulceration location: left foot plantar 1st ray amputation stump reopened  Pre debridement Measurement 0.3 x 0.2 x 0.1cm post debridement measurement 0.4 x 0.2 x 0.1 cm  Signs of infection: none.   No malodor, no erythema  Drainage: Serosanguinous  Purulence: no  Crepitus/fluctuance: no  Periwound: Macerated, Calloused  Base: Granular  Depth: subcutaneous tissue  Probe to bone: no    Dorsal lateral R 5th ray amputation site, healed.   Neurological:      Mental Status: He is alert and oriented to person, place, and time.      Sensory: Sensory deficit present.      Motor: No weakness.      Comments: Diminished/loss of protective sensation all toes bilateral to 10 gram monofilament.   Psychiatric:         Mood and Affect: Mood normal.         Thought Content: Thought content normal.             Assessment:       No diagnosis found.        Plan:       There are no diagnoses linked to this encounter.    I counseled the patient on his conditions, their implications and medical management.    Wound Debridement    Performed by: Mingo Melara DPM   Authorized by: Patient    Time out: Immediately prior to procedure a "time out" was called to verify the correct patient, procedure, equipment, support staff and site/side marked as required.   Consent Done?:  Yes (Verbal)  Local anesthesia used?: No       Wound Details:    Location:   plantar left foot     " Type of Debridement:  Excisional       Length (cm):  0.4       Width (cm):   0.2       Depth (cm):   0.1       Percent Debrided (%):  100             Depth of debridement:  Subcutaneous tissue    Tissue debrided:  Dermis, Epidermis and Subcutaneous    Devitalized tissue debrided:  Biofilm, Callus and Necrotic/Eschar    Instruments:  Blade, Curette and Nippers     Bleeding:  Minimal  Hemostasis Achieved: Yes    Method Used:  Pressure  Patient tolerance:  Patient tolerated the procedure well with no immediate complications    The nature of the condition, options for management, as well as potential risks and complications were discussed in detail with patient. Patient was amenable to my recommendations and left my office fully informed and will follow up as instructed or sooner if necessary.      Football dressing applied to the left, wound care discussed in detail.  Follow-up in 1 week.

## 2022-09-21 ENCOUNTER — OFFICE VISIT (OUTPATIENT)
Dept: PODIATRY | Facility: CLINIC | Age: 57
End: 2022-09-21
Payer: COMMERCIAL

## 2022-09-21 VITALS
SYSTOLIC BLOOD PRESSURE: 118 MMHG | BODY MASS INDEX: 36.03 KG/M2 | WEIGHT: 280.63 LBS | DIASTOLIC BLOOD PRESSURE: 60 MMHG | HEART RATE: 64 BPM

## 2022-09-21 DIAGNOSIS — L97.512 FOOT ULCER, RIGHT, WITH FAT LAYER EXPOSED: ICD-10-CM

## 2022-09-21 DIAGNOSIS — L97.522 FOOT ULCER, LEFT, WITH FAT LAYER EXPOSED: ICD-10-CM

## 2022-09-21 DIAGNOSIS — E11.49 TYPE II DIABETES MELLITUS WITH NEUROLOGICAL MANIFESTATIONS: Primary | ICD-10-CM

## 2022-09-21 PROCEDURE — 3078F PR MOST RECENT DIASTOLIC BLOOD PRESSURE < 80 MM HG: ICD-10-PCS | Mod: CPTII,S$GLB,, | Performed by: PODIATRIST

## 2022-09-21 PROCEDURE — 99213 OFFICE O/P EST LOW 20 MIN: CPT | Mod: 24,25,S$GLB, | Performed by: PODIATRIST

## 2022-09-21 PROCEDURE — 3074F PR MOST RECENT SYSTOLIC BLOOD PRESSURE < 130 MM HG: ICD-10-PCS | Mod: CPTII,S$GLB,, | Performed by: PODIATRIST

## 2022-09-21 PROCEDURE — 4010F PR ACE/ARB THEARPY RXD/TAKEN: ICD-10-PCS | Mod: CPTII,S$GLB,, | Performed by: PODIATRIST

## 2022-09-21 PROCEDURE — 3074F SYST BP LT 130 MM HG: CPT | Mod: CPTII,S$GLB,, | Performed by: PODIATRIST

## 2022-09-21 PROCEDURE — 1159F PR MEDICATION LIST DOCUMENTED IN MEDICAL RECORD: ICD-10-PCS | Mod: CPTII,S$GLB,, | Performed by: PODIATRIST

## 2022-09-21 PROCEDURE — 11042 PR DEBRIDEMENT, SKIN, SUB-Q TISSUE,=<20 SQ CM: ICD-10-PCS | Mod: 79,S$GLB,, | Performed by: PODIATRIST

## 2022-09-21 PROCEDURE — 11042 DBRDMT SUBQ TIS 1ST 20SQCM/<: CPT | Mod: 79,S$GLB,, | Performed by: PODIATRIST

## 2022-09-21 PROCEDURE — 3008F PR BODY MASS INDEX (BMI) DOCUMENTED: ICD-10-PCS | Mod: CPTII,S$GLB,, | Performed by: PODIATRIST

## 2022-09-21 PROCEDURE — 99999 PR PBB SHADOW E&M-EST. PATIENT-LVL III: ICD-10-PCS | Mod: PBBFAC,,, | Performed by: PODIATRIST

## 2022-09-21 PROCEDURE — 3078F DIAST BP <80 MM HG: CPT | Mod: CPTII,S$GLB,, | Performed by: PODIATRIST

## 2022-09-21 PROCEDURE — 1159F MED LIST DOCD IN RCRD: CPT | Mod: CPTII,S$GLB,, | Performed by: PODIATRIST

## 2022-09-21 PROCEDURE — 3062F PR POS MACROALBUMINURIA RESULT DOCUMENTED/REVIEW: ICD-10-PCS | Mod: CPTII,S$GLB,, | Performed by: PODIATRIST

## 2022-09-21 PROCEDURE — 99213 PR OFFICE/OUTPT VISIT, EST, LEVL III, 20-29 MIN: ICD-10-PCS | Mod: 24,25,S$GLB, | Performed by: PODIATRIST

## 2022-09-21 PROCEDURE — 3052F PR MOST RECENT HEMOGLOBIN A1C LEVEL 8.0 - < 9.0%: ICD-10-PCS | Mod: CPTII,S$GLB,, | Performed by: PODIATRIST

## 2022-09-21 PROCEDURE — 4010F ACE/ARB THERAPY RXD/TAKEN: CPT | Mod: CPTII,S$GLB,, | Performed by: PODIATRIST

## 2022-09-21 PROCEDURE — 3066F NEPHROPATHY DOC TX: CPT | Mod: CPTII,S$GLB,, | Performed by: PODIATRIST

## 2022-09-21 PROCEDURE — 3008F BODY MASS INDEX DOCD: CPT | Mod: CPTII,S$GLB,, | Performed by: PODIATRIST

## 2022-09-21 PROCEDURE — 99999 PR PBB SHADOW E&M-EST. PATIENT-LVL III: CPT | Mod: PBBFAC,,, | Performed by: PODIATRIST

## 2022-09-21 PROCEDURE — 3052F HG A1C>EQUAL 8.0%<EQUAL 9.0%: CPT | Mod: CPTII,S$GLB,, | Performed by: PODIATRIST

## 2022-09-21 PROCEDURE — 3066F PR DOCUMENTATION OF TREATMENT FOR NEPHROPATHY: ICD-10-PCS | Mod: CPTII,S$GLB,, | Performed by: PODIATRIST

## 2022-09-21 PROCEDURE — 3062F POS MACROALBUMINURIA REV: CPT | Mod: CPTII,S$GLB,, | Performed by: PODIATRIST

## 2022-10-01 NOTE — PROGRESS NOTES
Subjective:      Patient ID: Billy Rivera is a 57 y.o. male.    Chief Complaint: Wound Care    Billy is a 57 y.o. male who presents to the clinic for evaluation and treatment of high risk feet. Billy has a past medical history of Allergy, CAD (coronary artery disease), native coronary artery (6/25/2013), Cancer, COVID-19 virus detected (9/1/2020), Diabetes mellitus, Diabetes mellitus, type 2, Disorder of kidney and ureter, Heart attack (04/2012), colon cancer, stage I, Hyperlipidemia, Hypertension, Muscular pain, NSTEMI May 2013 - peak troponin 0.22 (5/22/2013), MICHAELA (obstructive sleep apnea), and Retinopathy due to secondary diabetes. The patient's chief complaint is foot ulcer both feet.   No other new complaints today.  PCP: BRAD Baker MD    Date Last Seen by PCP: 3/23/2022    Current shoe gear:  Affected Foot: DARCO Shoe right foot     Affected Foot: Tennis shoe left foot    History of Trauma: negative  Sign of Infection: none    Hemoglobin A1C   Date Value Ref Range Status   08/09/2022 8.0 (H) 4.0 - 5.6 % Final     Comment:     ADA Screening Guidelines:  5.7-6.4%  Consistent with prediabetes  >or=6.5%  Consistent with diabetes    High levels of fetal hemoglobin interfere with the HbA1C  assay. Heterozygous hemoglobin variants (HbS, HgC, etc)do  not significantly interfere with this assay.   However, presence of multiple variants may affect accuracy.     06/08/2022 10.0 (H) 4.0 - 5.6 % Final     Comment:     ADA Screening Guidelines:  5.7-6.4%  Consistent with prediabetes  >or=6.5%  Consistent with diabetes    High levels of fetal hemoglobin interfere with the HbA1C  assay. Heterozygous hemoglobin variants (HbS, HgC, etc)do  not significantly interfere with this assay.   However, presence of multiple variants may affect accuracy.     01/26/2022 12.2 (H) 4.0 - 5.6 % Final     Comment:     ADA Screening Guidelines:  5.7-6.4%  Consistent with prediabetes  >or=6.5%  Consistent with  diabetes    High levels of fetal hemoglobin interfere with the HbA1C  assay. Heterozygous hemoglobin variants (HbS, HgC, etc)do  not significantly interfere with this assay.   However, presence of multiple variants may affect accuracy.         Review of Systems   Constitutional: Negative for chills, decreased appetite, fever, malaise/fatigue and night sweats.   HENT:  Negative for congestion, hearing loss, nosebleeds and tinnitus.    Eyes:  Negative for double vision, pain, photophobia and visual disturbance.   Cardiovascular:  Negative for chest pain, claudication, cyanosis and leg swelling.   Respiratory:  Negative for cough, hemoptysis, shortness of breath and wheezing.    Endocrine: Negative for cold intolerance and heat intolerance.   Hematologic/Lymphatic: Negative for adenopathy and bleeding problem.   Skin:  Positive for poor wound healing. Negative for color change, dry skin, itching, nail changes, rash and suspicious lesions.   Musculoskeletal:  Negative for arthritis, falls, gout, joint pain, myalgias and stiffness.   Gastrointestinal:  Negative for abdominal pain, jaundice, nausea and vomiting.   Genitourinary:  Negative for dysuria, frequency and hematuria.   Neurological:  Positive for numbness and sensory change. Negative for difficulty with concentration, loss of balance and paresthesias.   Psychiatric/Behavioral:  Negative for altered mental status, hallucinations and suicidal ideas. The patient is not nervous/anxious.    Allergic/Immunologic: Negative for environmental allergies and persistent infections.         Objective:        Physical Exam  Constitutional:       General: He is not in acute distress.     Appearance: Normal appearance.   Cardiovascular:      Pulses:           Dorsalis pedis pulses are 2+ on the right side and 2+ on the left side.        Posterior tibial pulses are 2+ on the right side and 2+ on the left side.      Comments: CFT< 3 secs all toes bilateral foot, skin temp warm to  cool bilateral foot, no hair growth bilateral lower extremity, no edema to bilateral lower extremities    Musculoskeletal:         General: No swelling.      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     5/5 muscle strength with DF/PF/Inv/Ev bilateral.    Inspection and palpation of the joints and bones reveal no crepitus or joint effusion. No tenderness upon palpation.  Angle and base of gait are normal.    Skin:     General: Skin is warm and dry.      Capillary Refill: Capillary refill takes 2 to 3 seconds.      Findings: No erythema.      Comments: Ulceration location: left foot plantar 1st ray amputation stump reopened  Pre debridement Measurement 0.2 x 0.2 x 0.1cm post debridement measurement 0.3cm x  0.2 x 0.1 cm  Signs of infection: none.   No malodor, no erythema  Drainage: Serosanguinous  Purulence: no  Crepitus/fluctuance: no  Periwound: Macerated, Calloused  Base: Granular  Depth: subcutaneous tissue  Probe to bone: no    Dorsal lateral R 5th ray amputation site, healed.   Neurological:      Mental Status: He is alert and oriented to person, place, and time.      Sensory: Sensory deficit present.      Motor: No weakness.      Comments: Diminished/loss of protective sensation all toes bilateral to 10 gram monofilament.   Psychiatric:         Mood and Affect: Mood normal.         Thought Content: Thought content normal.             Assessment:       Encounter Diagnoses   Name Primary?    Type II diabetes mellitus with neurological manifestations Yes    Foot ulcer, left, with fat layer exposed     Foot ulcer, right, with fat layer exposed            Plan:       Billy was seen today for wound care.    Diagnoses and all orders for this visit:    Type II diabetes mellitus with neurological manifestations    Foot ulcer, left, with fat layer exposed    Foot ulcer, right, with fat layer exposed      I counseled the patient on his conditions, their implications and  "medical management.    Wound Debridement    Performed by: Mingo Melara DPM   Authorized by: Patient    Time out: Immediately prior to procedure a "time out" was called to verify the correct patient, procedure, equipment, support staff and site/side marked as required.   Consent Done?:  Yes (Verbal)  Local anesthesia used?: No       Wound Details:    Location:   plantar left foot     Type of Debridement:  Excisional       Length (cm):  0.3       Width (cm):   0.2       Depth (cm):   0.1       Percent Debrided (%):  100             Depth of debridement:  Subcutaneous tissue    Tissue debrided:  Dermis, Epidermis and Subcutaneous    Devitalized tissue debrided:  Biofilm, Callus and Necrotic/Eschar    Instruments:  Blade, Curette and Nippers     Bleeding:  Minimal  Hemostasis Achieved: Yes    Method Used:  Pressure  Patient tolerance:  Patient tolerated the procedure well with no immediate complications    The nature of the condition, options for management, as well as potential risks and complications were discussed in detail with patient. Patient was amenable to my recommendations and left my office fully informed and will follow up as instructed or sooner if necessary.      Football dressing applied to the left, wound care discussed in detail.  Follow-up in 1 week.            "

## 2022-10-05 ENCOUNTER — OFFICE VISIT (OUTPATIENT)
Dept: PODIATRY | Facility: CLINIC | Age: 57
End: 2022-10-05
Payer: COMMERCIAL

## 2022-10-05 VITALS
HEART RATE: 59 BPM | BODY MASS INDEX: 36.03 KG/M2 | WEIGHT: 280.63 LBS | DIASTOLIC BLOOD PRESSURE: 80 MMHG | SYSTOLIC BLOOD PRESSURE: 139 MMHG

## 2022-10-05 DIAGNOSIS — L97.512 FOOT ULCER, RIGHT, WITH FAT LAYER EXPOSED: ICD-10-CM

## 2022-10-05 DIAGNOSIS — L97.522 FOOT ULCER, LEFT, WITH FAT LAYER EXPOSED: ICD-10-CM

## 2022-10-05 DIAGNOSIS — E11.49 TYPE II DIABETES MELLITUS WITH NEUROLOGICAL MANIFESTATIONS: Primary | ICD-10-CM

## 2022-10-05 PROCEDURE — 3008F PR BODY MASS INDEX (BMI) DOCUMENTED: ICD-10-PCS | Mod: CPTII,S$GLB,, | Performed by: PODIATRIST

## 2022-10-05 PROCEDURE — 99999 PR PBB SHADOW E&M-EST. PATIENT-LVL III: CPT | Mod: PBBFAC,,, | Performed by: PODIATRIST

## 2022-10-05 PROCEDURE — 3066F NEPHROPATHY DOC TX: CPT | Mod: CPTII,S$GLB,, | Performed by: PODIATRIST

## 2022-10-05 PROCEDURE — 3075F PR MOST RECENT SYSTOLIC BLOOD PRESS GE 130-139MM HG: ICD-10-PCS | Mod: CPTII,S$GLB,, | Performed by: PODIATRIST

## 2022-10-05 PROCEDURE — 3052F HG A1C>EQUAL 8.0%<EQUAL 9.0%: CPT | Mod: CPTII,S$GLB,, | Performed by: PODIATRIST

## 2022-10-05 PROCEDURE — 3075F SYST BP GE 130 - 139MM HG: CPT | Mod: CPTII,S$GLB,, | Performed by: PODIATRIST

## 2022-10-05 PROCEDURE — 4010F ACE/ARB THERAPY RXD/TAKEN: CPT | Mod: CPTII,S$GLB,, | Performed by: PODIATRIST

## 2022-10-05 PROCEDURE — 99999 PR PBB SHADOW E&M-EST. PATIENT-LVL III: ICD-10-PCS | Mod: PBBFAC,,, | Performed by: PODIATRIST

## 2022-10-05 PROCEDURE — 3062F PR POS MACROALBUMINURIA RESULT DOCUMENTED/REVIEW: ICD-10-PCS | Mod: CPTII,S$GLB,, | Performed by: PODIATRIST

## 2022-10-05 PROCEDURE — 3066F PR DOCUMENTATION OF TREATMENT FOR NEPHROPATHY: ICD-10-PCS | Mod: CPTII,S$GLB,, | Performed by: PODIATRIST

## 2022-10-05 PROCEDURE — 4010F PR ACE/ARB THEARPY RXD/TAKEN: ICD-10-PCS | Mod: CPTII,S$GLB,, | Performed by: PODIATRIST

## 2022-10-05 PROCEDURE — 3062F POS MACROALBUMINURIA REV: CPT | Mod: CPTII,S$GLB,, | Performed by: PODIATRIST

## 2022-10-05 PROCEDURE — 3052F PR MOST RECENT HEMOGLOBIN A1C LEVEL 8.0 - < 9.0%: ICD-10-PCS | Mod: CPTII,S$GLB,, | Performed by: PODIATRIST

## 2022-10-05 PROCEDURE — 99213 PR OFFICE/OUTPT VISIT, EST, LEVL III, 20-29 MIN: ICD-10-PCS | Mod: 25,S$GLB,, | Performed by: PODIATRIST

## 2022-10-05 PROCEDURE — 3008F BODY MASS INDEX DOCD: CPT | Mod: CPTII,S$GLB,, | Performed by: PODIATRIST

## 2022-10-05 PROCEDURE — 3079F PR MOST RECENT DIASTOLIC BLOOD PRESSURE 80-89 MM HG: ICD-10-PCS | Mod: CPTII,S$GLB,, | Performed by: PODIATRIST

## 2022-10-05 PROCEDURE — 3079F DIAST BP 80-89 MM HG: CPT | Mod: CPTII,S$GLB,, | Performed by: PODIATRIST

## 2022-10-05 PROCEDURE — 99213 OFFICE O/P EST LOW 20 MIN: CPT | Mod: 25,S$GLB,, | Performed by: PODIATRIST

## 2022-10-12 ENCOUNTER — OFFICE VISIT (OUTPATIENT)
Dept: PODIATRY | Facility: CLINIC | Age: 57
End: 2022-10-12
Payer: COMMERCIAL

## 2022-10-12 VITALS
HEART RATE: 67 BPM | SYSTOLIC BLOOD PRESSURE: 141 MMHG | DIASTOLIC BLOOD PRESSURE: 90 MMHG | BODY MASS INDEX: 36.03 KG/M2 | WEIGHT: 280.63 LBS

## 2022-10-12 DIAGNOSIS — E11.49 TYPE II DIABETES MELLITUS WITH NEUROLOGICAL MANIFESTATIONS: ICD-10-CM

## 2022-10-12 DIAGNOSIS — L97.512 FOOT ULCER, RIGHT, WITH FAT LAYER EXPOSED: Primary | ICD-10-CM

## 2022-10-12 PROCEDURE — 3077F PR MOST RECENT SYSTOLIC BLOOD PRESSURE >= 140 MM HG: ICD-10-PCS | Mod: CPTII,S$GLB,, | Performed by: PODIATRIST

## 2022-10-12 PROCEDURE — 3066F NEPHROPATHY DOC TX: CPT | Mod: CPTII,S$GLB,, | Performed by: PODIATRIST

## 2022-10-12 PROCEDURE — 99213 PR OFFICE/OUTPT VISIT, EST, LEVL III, 20-29 MIN: ICD-10-PCS | Mod: 25,S$GLB,, | Performed by: PODIATRIST

## 2022-10-12 PROCEDURE — 99213 OFFICE O/P EST LOW 20 MIN: CPT | Mod: 25,S$GLB,, | Performed by: PODIATRIST

## 2022-10-12 PROCEDURE — 3062F PR POS MACROALBUMINURIA RESULT DOCUMENTED/REVIEW: ICD-10-PCS | Mod: CPTII,S$GLB,, | Performed by: PODIATRIST

## 2022-10-12 PROCEDURE — 4010F PR ACE/ARB THEARPY RXD/TAKEN: ICD-10-PCS | Mod: CPTII,S$GLB,, | Performed by: PODIATRIST

## 2022-10-12 PROCEDURE — 3052F PR MOST RECENT HEMOGLOBIN A1C LEVEL 8.0 - < 9.0%: ICD-10-PCS | Mod: CPTII,S$GLB,, | Performed by: PODIATRIST

## 2022-10-12 PROCEDURE — 3052F HG A1C>EQUAL 8.0%<EQUAL 9.0%: CPT | Mod: CPTII,S$GLB,, | Performed by: PODIATRIST

## 2022-10-12 PROCEDURE — 3066F PR DOCUMENTATION OF TREATMENT FOR NEPHROPATHY: ICD-10-PCS | Mod: CPTII,S$GLB,, | Performed by: PODIATRIST

## 2022-10-12 PROCEDURE — 99999 PR PBB SHADOW E&M-EST. PATIENT-LVL III: ICD-10-PCS | Mod: PBBFAC,,, | Performed by: PODIATRIST

## 2022-10-12 PROCEDURE — 3080F DIAST BP >= 90 MM HG: CPT | Mod: CPTII,S$GLB,, | Performed by: PODIATRIST

## 2022-10-12 PROCEDURE — 4010F ACE/ARB THERAPY RXD/TAKEN: CPT | Mod: CPTII,S$GLB,, | Performed by: PODIATRIST

## 2022-10-12 PROCEDURE — 99999 PR PBB SHADOW E&M-EST. PATIENT-LVL III: CPT | Mod: PBBFAC,,, | Performed by: PODIATRIST

## 2022-10-12 PROCEDURE — 3080F PR MOST RECENT DIASTOLIC BLOOD PRESSURE >= 90 MM HG: ICD-10-PCS | Mod: CPTII,S$GLB,, | Performed by: PODIATRIST

## 2022-10-12 PROCEDURE — 3062F POS MACROALBUMINURIA REV: CPT | Mod: CPTII,S$GLB,, | Performed by: PODIATRIST

## 2022-10-12 PROCEDURE — 3077F SYST BP >= 140 MM HG: CPT | Mod: CPTII,S$GLB,, | Performed by: PODIATRIST

## 2022-10-12 NOTE — PROGRESS NOTES
Subjective:      Patient ID: Billy Rivera is a 57 y.o. male.    Chief Complaint: Wound Care (Bi/lat wound care )    Billy is a 57 y.o. male who presents to the clinic for evaluation and treatment of high risk feet. Billy has a past medical history of Allergy, CAD (coronary artery disease), native coronary artery (6/25/2013), Cancer, COVID-19 virus detected (9/1/2020), Diabetes mellitus, Diabetes mellitus, type 2, Disorder of kidney and ureter, Heart attack (04/2012), colon cancer, stage I, Hyperlipidemia, Hypertension, Muscular pain, NSTEMI May 2013 - peak troponin 0.22 (5/22/2013), MICHAELA (obstructive sleep apnea), and Retinopathy due to secondary diabetes. The patient's chief complaint is foot ulcer both feet.   No other new complaints today.  PCP: BRAD Baker MD    Date Last Seen by PCP: 3/23/2022    Current shoe gear:  Affected Foot: DARCO Shoe right foot     Affected Foot: Tennis shoe left foot    History of Trauma: negative  Sign of Infection: none    Hemoglobin A1C   Date Value Ref Range Status   08/09/2022 8.0 (H) 4.0 - 5.6 % Final     Comment:     ADA Screening Guidelines:  5.7-6.4%  Consistent with prediabetes  >or=6.5%  Consistent with diabetes    High levels of fetal hemoglobin interfere with the HbA1C  assay. Heterozygous hemoglobin variants (HbS, HgC, etc)do  not significantly interfere with this assay.   However, presence of multiple variants may affect accuracy.     06/08/2022 10.0 (H) 4.0 - 5.6 % Final     Comment:     ADA Screening Guidelines:  5.7-6.4%  Consistent with prediabetes  >or=6.5%  Consistent with diabetes    High levels of fetal hemoglobin interfere with the HbA1C  assay. Heterozygous hemoglobin variants (HbS, HgC, etc)do  not significantly interfere with this assay.   However, presence of multiple variants may affect accuracy.     01/26/2022 12.2 (H) 4.0 - 5.6 % Final     Comment:     ADA Screening Guidelines:  5.7-6.4%  Consistent with prediabetes  >or=6.5%  Consistent  with diabetes    High levels of fetal hemoglobin interfere with the HbA1C  assay. Heterozygous hemoglobin variants (HbS, HgC, etc)do  not significantly interfere with this assay.   However, presence of multiple variants may affect accuracy.         Review of Systems   Constitutional: Negative for chills, decreased appetite, fever, malaise/fatigue and night sweats.   HENT:  Negative for congestion, hearing loss, nosebleeds and tinnitus.    Eyes:  Negative for double vision, pain, photophobia and visual disturbance.   Cardiovascular:  Negative for chest pain, claudication, cyanosis and leg swelling.   Respiratory:  Negative for cough, hemoptysis, shortness of breath and wheezing.    Endocrine: Negative for cold intolerance and heat intolerance.   Hematologic/Lymphatic: Negative for adenopathy and bleeding problem.   Skin:  Positive for poor wound healing. Negative for color change, dry skin, itching, nail changes, rash and suspicious lesions.   Musculoskeletal:  Negative for arthritis, falls, gout, joint pain, myalgias and stiffness.   Gastrointestinal:  Negative for abdominal pain, jaundice, nausea and vomiting.   Genitourinary:  Negative for dysuria, frequency and hematuria.   Neurological:  Positive for numbness and sensory change. Negative for difficulty with concentration, loss of balance and paresthesias.   Psychiatric/Behavioral:  Negative for altered mental status, hallucinations and suicidal ideas. The patient is not nervous/anxious.    Allergic/Immunologic: Negative for environmental allergies and persistent infections.         Objective:        Physical Exam  Constitutional:       General: He is not in acute distress.     Appearance: Normal appearance.   Cardiovascular:      Pulses:           Dorsalis pedis pulses are 2+ on the right side and 2+ on the left side.        Posterior tibial pulses are 2+ on the right side and 2+ on the left side.      Comments: CFT< 3 secs all toes bilateral foot, skin temp  "warm to cool bilateral foot, no hair growth bilateral lower extremity, no edema to bilateral lower extremities    Musculoskeletal:         General: No swelling.      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     5/5 muscle strength with DF/PF/Inv/Ev bilateral.    Inspection and palpation of the joints and bones reveal no crepitus or joint effusion. No tenderness upon palpation.  Angle and base of gait are normal.    Skin:     General: Skin is warm and dry.      Capillary Refill: Capillary refill takes 2 to 3 seconds.      Findings: No erythema.      Comments: Ulceration location:  Right plantar 2nd MPJ measuring 1 cm in diameter by 0.2 cm in depth post debridement.  Pre debridement measuring 0.8 cm in diameter by 0.2 cm in depth.   Neurological:      Mental Status: He is alert and oriented to person, place, and time.      Sensory: Sensory deficit present.      Motor: No weakness.      Comments: Diminished/loss of protective sensation all toes bilateral to 10 gram monofilament.   Psychiatric:         Mood and Affect: Mood normal.         Thought Content: Thought content normal.             Assessment:       Encounter Diagnoses   Name Primary?    Type II diabetes mellitus with neurological manifestations Yes    Foot ulcer, left, with fat layer exposed     Foot ulcer, right, with fat layer exposed            Plan:       Billy was seen today for wound care.    Diagnoses and all orders for this visit:    Type II diabetes mellitus with neurological manifestations    Foot ulcer, left, with fat layer exposed    Foot ulcer, right, with fat layer exposed      I counseled the patient on his conditions, their implications and medical management.    Wound Debridement    Performed by: Mingo Melara DPM   Authorized by: Patient    Time out: Immediately prior to procedure a "time out" was called to verify the correct patient, procedure, equipment, support staff and site/side " marked as required.   Consent Done?:  Yes (Verbal)  Local anesthesia used?: No       Wound Details:    Location:   plantar right foot     Type of Debridement:  Excisional       Length (cm): 1.0       Width (cm):   1.0       Depth (cm):   0.2       Percent Debrided (%):  100             Depth of debridement:  Subcutaneous tissue    Tissue debrided:  Dermis, Epidermis and Subcutaneous    Devitalized tissue debrided:  Biofilm, Callus and Necrotic/Eschar    Instruments:  Blade, Curette and Nippers     Bleeding:  Minimal  Hemostasis Achieved: Yes    Method Used:  Pressure  Patient tolerance:  Patient tolerated the procedure well with no immediate complications    The nature of the condition, options for management, as well as potential risks and complications were discussed in detail with patient. Patient was amenable to my recommendations and left my office fully informed and will follow up as instructed or sooner if necessary.      Football dressing applied to the left, wound care discussed in detail.  Follow-up in 1 week.

## 2022-10-20 ENCOUNTER — OFFICE VISIT (OUTPATIENT)
Dept: PODIATRY | Facility: CLINIC | Age: 57
End: 2022-10-20
Payer: COMMERCIAL

## 2022-10-20 VITALS
HEART RATE: 69 BPM | SYSTOLIC BLOOD PRESSURE: 147 MMHG | BODY MASS INDEX: 36.03 KG/M2 | DIASTOLIC BLOOD PRESSURE: 83 MMHG | WEIGHT: 280.63 LBS

## 2022-10-20 DIAGNOSIS — L97.512 FOOT ULCER, RIGHT, WITH FAT LAYER EXPOSED: Primary | ICD-10-CM

## 2022-10-20 DIAGNOSIS — M24.571 EQUINUS CONTRACTURE OF RIGHT ANKLE: ICD-10-CM

## 2022-10-20 DIAGNOSIS — M21.961 METATARSAL DEFORMITY, RIGHT: ICD-10-CM

## 2022-10-20 DIAGNOSIS — E11.49 TYPE II DIABETES MELLITUS WITH NEUROLOGICAL MANIFESTATIONS: ICD-10-CM

## 2022-10-20 PROCEDURE — 99999 PR PBB SHADOW E&M-EST. PATIENT-LVL IV: CPT | Mod: PBBFAC,,, | Performed by: PODIATRIST

## 2022-10-20 PROCEDURE — 11042 PR DEBRIDEMENT, SKIN, SUB-Q TISSUE,=<20 SQ CM: ICD-10-PCS | Mod: RT,S$GLB,, | Performed by: PODIATRIST

## 2022-10-20 PROCEDURE — 3062F PR POS MACROALBUMINURIA RESULT DOCUMENTED/REVIEW: ICD-10-PCS | Mod: CPTII,S$GLB,, | Performed by: PODIATRIST

## 2022-10-20 PROCEDURE — 99214 OFFICE O/P EST MOD 30 MIN: CPT | Mod: 25,S$GLB,, | Performed by: PODIATRIST

## 2022-10-20 PROCEDURE — 1159F PR MEDICATION LIST DOCUMENTED IN MEDICAL RECORD: ICD-10-PCS | Mod: CPTII,S$GLB,, | Performed by: PODIATRIST

## 2022-10-20 PROCEDURE — 99214 PR OFFICE/OUTPT VISIT, EST, LEVL IV, 30-39 MIN: ICD-10-PCS | Mod: 25,S$GLB,, | Performed by: PODIATRIST

## 2022-10-20 PROCEDURE — 3052F PR MOST RECENT HEMOGLOBIN A1C LEVEL 8.0 - < 9.0%: ICD-10-PCS | Mod: CPTII,S$GLB,, | Performed by: PODIATRIST

## 2022-10-20 PROCEDURE — 1159F MED LIST DOCD IN RCRD: CPT | Mod: CPTII,S$GLB,, | Performed by: PODIATRIST

## 2022-10-20 PROCEDURE — 3079F PR MOST RECENT DIASTOLIC BLOOD PRESSURE 80-89 MM HG: ICD-10-PCS | Mod: CPTII,S$GLB,, | Performed by: PODIATRIST

## 2022-10-20 PROCEDURE — 3079F DIAST BP 80-89 MM HG: CPT | Mod: CPTII,S$GLB,, | Performed by: PODIATRIST

## 2022-10-20 PROCEDURE — 3077F PR MOST RECENT SYSTOLIC BLOOD PRESSURE >= 140 MM HG: ICD-10-PCS | Mod: CPTII,S$GLB,, | Performed by: PODIATRIST

## 2022-10-20 PROCEDURE — 4010F PR ACE/ARB THEARPY RXD/TAKEN: ICD-10-PCS | Mod: CPTII,S$GLB,, | Performed by: PODIATRIST

## 2022-10-20 PROCEDURE — 4010F ACE/ARB THERAPY RXD/TAKEN: CPT | Mod: CPTII,S$GLB,, | Performed by: PODIATRIST

## 2022-10-20 PROCEDURE — 3066F NEPHROPATHY DOC TX: CPT | Mod: CPTII,S$GLB,, | Performed by: PODIATRIST

## 2022-10-20 PROCEDURE — 3077F SYST BP >= 140 MM HG: CPT | Mod: CPTII,S$GLB,, | Performed by: PODIATRIST

## 2022-10-20 PROCEDURE — 11042 DBRDMT SUBQ TIS 1ST 20SQCM/<: CPT | Mod: RT,S$GLB,, | Performed by: PODIATRIST

## 2022-10-20 PROCEDURE — 3052F HG A1C>EQUAL 8.0%<EQUAL 9.0%: CPT | Mod: CPTII,S$GLB,, | Performed by: PODIATRIST

## 2022-10-20 PROCEDURE — 3062F POS MACROALBUMINURIA REV: CPT | Mod: CPTII,S$GLB,, | Performed by: PODIATRIST

## 2022-10-20 PROCEDURE — 3066F PR DOCUMENTATION OF TREATMENT FOR NEPHROPATHY: ICD-10-PCS | Mod: CPTII,S$GLB,, | Performed by: PODIATRIST

## 2022-10-20 PROCEDURE — 99999 PR PBB SHADOW E&M-EST. PATIENT-LVL IV: ICD-10-PCS | Mod: PBBFAC,,, | Performed by: PODIATRIST

## 2022-10-22 NOTE — PROGRESS NOTES
Subjective:      Patient ID: Billy Rivera is a 57 y.o. male.    Chief Complaint: Wound Care (Bi/lat wound care )    Billy is a 57 y.o. male who presents to the clinic for evaluation and treatment of high risk feet. Billy has a past medical history of Allergy, CAD (coronary artery disease), native coronary artery (6/25/2013), Cancer, COVID-19 virus detected (9/1/2020), Diabetes mellitus, Diabetes mellitus, type 2, Disorder of kidney and ureter, Heart attack (04/2012), colon cancer, stage I, Hyperlipidemia, Hypertension, Muscular pain, NSTEMI May 2013 - peak troponin 0.22 (5/22/2013), MICHAELA (obstructive sleep apnea), and Retinopathy due to secondary diabetes. The patient's chief complaint is foot ulcer both feet.   No other new complaints today.  PCP: BRAD Baker MD    Date Last Seen by PCP: 3/23/2022    Current shoe gear:  Affected Foot: DARCO Shoe right foot     Affected Foot: Tennis shoe left foot    History of Trauma: negative  Sign of Infection: none    Hemoglobin A1C   Date Value Ref Range Status   08/09/2022 8.0 (H) 4.0 - 5.6 % Final     Comment:     ADA Screening Guidelines:  5.7-6.4%  Consistent with prediabetes  >or=6.5%  Consistent with diabetes    High levels of fetal hemoglobin interfere with the HbA1C  assay. Heterozygous hemoglobin variants (HbS, HgC, etc)do  not significantly interfere with this assay.   However, presence of multiple variants may affect accuracy.     06/08/2022 10.0 (H) 4.0 - 5.6 % Final     Comment:     ADA Screening Guidelines:  5.7-6.4%  Consistent with prediabetes  >or=6.5%  Consistent with diabetes    High levels of fetal hemoglobin interfere with the HbA1C  assay. Heterozygous hemoglobin variants (HbS, HgC, etc)do  not significantly interfere with this assay.   However, presence of multiple variants may affect accuracy.     01/26/2022 12.2 (H) 4.0 - 5.6 % Final     Comment:     ADA Screening Guidelines:  5.7-6.4%  Consistent with prediabetes  >or=6.5%   Consistent with diabetes    High levels of fetal hemoglobin interfere with the HbA1C  assay. Heterozygous hemoglobin variants (HbS, HgC, etc)do  not significantly interfere with this assay.   However, presence of multiple variants may affect accuracy.         Review of Systems   Constitutional: Negative for chills, decreased appetite, fever, malaise/fatigue and night sweats.   HENT:  Negative for congestion, hearing loss, nosebleeds and tinnitus.    Eyes:  Negative for double vision, pain, photophobia and visual disturbance.   Cardiovascular:  Negative for chest pain, claudication, cyanosis and leg swelling.   Respiratory:  Negative for cough, hemoptysis, shortness of breath and wheezing.    Endocrine: Negative for cold intolerance and heat intolerance.   Hematologic/Lymphatic: Negative for adenopathy and bleeding problem.   Skin:  Positive for poor wound healing. Negative for color change, dry skin, itching, nail changes, rash and suspicious lesions.   Musculoskeletal:  Negative for arthritis, falls, gout, joint pain, myalgias and stiffness.   Gastrointestinal:  Negative for abdominal pain, jaundice, nausea and vomiting.   Genitourinary:  Negative for dysuria, frequency and hematuria.   Neurological:  Positive for numbness and sensory change. Negative for difficulty with concentration, loss of balance and paresthesias.   Psychiatric/Behavioral:  Negative for altered mental status, hallucinations and suicidal ideas. The patient is not nervous/anxious.    Allergic/Immunologic: Negative for environmental allergies and persistent infections.         Objective:        Physical Exam  Constitutional:       General: He is not in acute distress.     Appearance: Normal appearance.   Cardiovascular:      Pulses:           Dorsalis pedis pulses are 2+ on the right side and 2+ on the left side.        Posterior tibial pulses are 2+ on the right side and 2+ on the left side.      Comments: CFT< 3 secs all toes bilateral foot,  "skin temp warm to cool bilateral foot, no hair growth bilateral lower extremity, no edema to bilateral lower extremities    Musculoskeletal:         General: No swelling.      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     5/5 muscle strength with DF/PF/Inv/Ev bilateral.    Inspection and palpation of the joints and bones reveal no crepitus or joint effusion. No tenderness upon palpation.  Angle and base of gait are normal.    Skin:     General: Skin is warm and dry.      Capillary Refill: Capillary refill takes 2 to 3 seconds.      Findings: No erythema.      Comments: Ulceration location:  Right plantar 2nd MPJ measuring 1 cm in diameter by 0.2 cm in depth post debridement.  Pre debridement measuring 0.8 cm in diameter by 0.2 cm in depth.   Neurological:      Mental Status: He is alert and oriented to person, place, and time.      Sensory: Sensory deficit present.      Motor: No weakness.      Comments: Diminished/loss of protective sensation all toes bilateral to 10 gram monofilament.   Psychiatric:         Mood and Affect: Mood normal.         Thought Content: Thought content normal.             Assessment:       Encounter Diagnoses   Name Primary?    Foot ulcer, right, with fat layer exposed Yes    Type II diabetes mellitus with neurological manifestations            Plan:       Billy was seen today for wound care.    Diagnoses and all orders for this visit:    Foot ulcer, right, with fat layer exposed  -     Skin Substitute Authorization    Type II diabetes mellitus with neurological manifestations  -     Skin Substitute Authorization      I counseled the patient on his conditions, their implications and medical management.    Wound Debridement    Performed by: Mingo Melara DPM   Authorized by: Patient    Time out: Immediately prior to procedure a "time out" was called to verify the correct patient, procedure, equipment, support staff and site/side marked as " required.   Consent Done?:  Yes (Verbal)  Local anesthesia used?: No       Wound Details:    Location:   plantar right foot     Type of Debridement:  Excisional       Length (cm): 0.8       Width (cm):   0.8       Depth (cm):   0.2       Percent Debrided (%):  100             Depth of debridement:  Subcutaneous tissue    Tissue debrided:  Dermis, Epidermis and Subcutaneous    Devitalized tissue debrided:  Biofilm, Callus and Necrotic/Eschar    Instruments:  Blade, Curette and Nippers     Bleeding:  Minimal  Hemostasis Achieved: Yes    Method Used:  Pressure  Patient tolerance:  Patient tolerated the procedure well with no immediate complications    The nature of the condition, options for management, as well as potential risks and complications were discussed in detail with patient. Patient was amenable to my recommendations and left my office fully informed and will follow up as instructed or sooner if necessary.      Football dressing applied to the left, wound care discussed in detail.  Follow-up in 1 week.

## 2022-10-25 ENCOUNTER — PATIENT OUTREACH (OUTPATIENT)
Dept: ADMINISTRATIVE | Facility: HOSPITAL | Age: 57
End: 2022-10-25
Payer: COMMERCIAL

## 2022-10-25 ENCOUNTER — PATIENT MESSAGE (OUTPATIENT)
Dept: ADMINISTRATIVE | Facility: HOSPITAL | Age: 57
End: 2022-10-25
Payer: COMMERCIAL

## 2022-10-27 NOTE — PROGRESS NOTES
Subjective:      Patient ID: Billy Rivera is a 57 y.o. male.    Chief Complaint: Wound Care (Wound care bi/lat )    Billy is a 57 y.o. male who presents to the clinic for evaluation and treatment of high risk feet. Billy has a past medical history of Allergy, CAD (coronary artery disease), native coronary artery (6/25/2013), Cancer, COVID-19 virus detected (9/1/2020), Diabetes mellitus, Diabetes mellitus, type 2, Disorder of kidney and ureter, Heart attack (04/2012), colon cancer, stage I, Hyperlipidemia, Hypertension, Muscular pain, NSTEMI May 2013 - peak troponin 0.22 (5/22/2013), MICHAELA (obstructive sleep apnea), and Retinopathy due to secondary diabetes. The patient's chief complaint is foot ulcer both feet.   No other new complaints today.  PCP: BRAD Baker MD    Date Last Seen by PCP: 3/23/2022    Current shoe gear:  Affected Foot: DARCO Shoe right foot     Affected Foot: Tennis shoe left foot    History of Trauma: negative  Sign of Infection: none    Hemoglobin A1C   Date Value Ref Range Status   08/09/2022 8.0 (H) 4.0 - 5.6 % Final     Comment:     ADA Screening Guidelines:  5.7-6.4%  Consistent with prediabetes  >or=6.5%  Consistent with diabetes    High levels of fetal hemoglobin interfere with the HbA1C  assay. Heterozygous hemoglobin variants (HbS, HgC, etc)do  not significantly interfere with this assay.   However, presence of multiple variants may affect accuracy.     06/08/2022 10.0 (H) 4.0 - 5.6 % Final     Comment:     ADA Screening Guidelines:  5.7-6.4%  Consistent with prediabetes  >or=6.5%  Consistent with diabetes    High levels of fetal hemoglobin interfere with the HbA1C  assay. Heterozygous hemoglobin variants (HbS, HgC, etc)do  not significantly interfere with this assay.   However, presence of multiple variants may affect accuracy.     01/26/2022 12.2 (H) 4.0 - 5.6 % Final     Comment:     ADA Screening Guidelines:  5.7-6.4%  Consistent with prediabetes  >or=6.5%   Consistent with diabetes    High levels of fetal hemoglobin interfere with the HbA1C  assay. Heterozygous hemoglobin variants (HbS, HgC, etc)do  not significantly interfere with this assay.   However, presence of multiple variants may affect accuracy.         Review of Systems   Constitutional: Negative for chills, decreased appetite, fever, malaise/fatigue and night sweats.   HENT:  Negative for congestion, hearing loss, nosebleeds and tinnitus.    Eyes:  Negative for double vision, pain, photophobia and visual disturbance.   Cardiovascular:  Negative for chest pain, claudication, cyanosis and leg swelling.   Respiratory:  Negative for cough, hemoptysis, shortness of breath and wheezing.    Endocrine: Negative for cold intolerance and heat intolerance.   Hematologic/Lymphatic: Negative for adenopathy and bleeding problem.   Skin:  Positive for poor wound healing. Negative for color change, dry skin, itching, nail changes, rash and suspicious lesions.   Musculoskeletal:  Negative for arthritis, falls, gout, joint pain, myalgias and stiffness.   Gastrointestinal:  Negative for abdominal pain, jaundice, nausea and vomiting.   Genitourinary:  Negative for dysuria, frequency and hematuria.   Neurological:  Positive for numbness and sensory change. Negative for difficulty with concentration, loss of balance and paresthesias.   Psychiatric/Behavioral:  Negative for altered mental status, hallucinations and suicidal ideas. The patient is not nervous/anxious.    Allergic/Immunologic: Negative for environmental allergies and persistent infections.         Objective:        Physical Exam  Constitutional:       General: He is not in acute distress.     Appearance: Normal appearance.   Cardiovascular:      Pulses:           Dorsalis pedis pulses are 2+ on the right side and 2+ on the left side.        Posterior tibial pulses are 2+ on the right side and 2+ on the left side.      Comments: CFT< 3 secs all toes bilateral foot,  skin temp warm to cool bilateral foot, no hair growth bilateral lower extremity, no edema to bilateral lower extremities    Musculoskeletal:         General: No swelling.      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     5/5 muscle strength with DF/PF/Inv/Ev bilateral.    Inspection and palpation of the joints and bones reveal no crepitus or joint effusion. No tenderness upon palpation.  Angle and base of gait are normal.    Skin:     General: Skin is warm and dry.      Capillary Refill: Capillary refill takes 2 to 3 seconds.      Findings: No erythema.      Comments: Ulceration location:  Right plantar 2nd MPJ measuring 1 cm in diameter by 0.2 cm in depth post debridement.  Pre debridement measuring 0.8 cm in diameter by 0.2 cm in depth.   Neurological:      Mental Status: He is alert and oriented to person, place, and time.      Sensory: Sensory deficit present.      Motor: No weakness.      Comments: Diminished/loss of protective sensation all toes bilateral to 10 gram monofilament.   Psychiatric:         Mood and Affect: Mood normal.         Thought Content: Thought content normal.             Assessment:       Encounter Diagnoses   Name Primary?    Foot ulcer, right, with fat layer exposed Yes    Type II diabetes mellitus with neurological manifestations     Metatarsal deformity, right     Equinus contracture of right ankle            Plan:       Billy was seen today for wound care.    Diagnoses and all orders for this visit:    Foot ulcer, right, with fat layer exposed  -     Case Request Operating Room: EXCISION,TARSAL OR METATARSAL BONE,EXCLUDING TALUS OR CALCANEUS,PARTIAL, RESECTION, MUSCLE, GASTROCNEMIUS    Type II diabetes mellitus with neurological manifestations    Metatarsal deformity, right  -     Case Request Operating Room: EXCISION,TARSAL OR METATARSAL BONE,EXCLUDING TALUS OR CALCANEUS,PARTIAL, RESECTION, MUSCLE, GASTROCNEMIUS    Equinus contracture  "of right ankle  -     Case Request Operating Room: EXCISION,TARSAL OR METATARSAL BONE,EXCLUDING TALUS OR CALCANEUS,PARTIAL, RESECTION, MUSCLE, GASTROCNEMIUS      I counseled the patient on his conditions, their implications and medical management.    Wound Debridement    Performed by: Mingo Melara DPM   Authorized by: Patient    Time out: Immediately prior to procedure a "time out" was called to verify the correct patient, procedure, equipment, support staff and site/side marked as required.   Consent Done?:  Yes (Verbal)  Local anesthesia used?: No       Wound Details:    Location:   plantar right foot     Type of Debridement:  Excisional       Length (cm): 0.8       Width (cm):   0.8       Depth (cm):   0.2       Percent Debrided (%):  100             Depth of debridement:  Subcutaneous tissue    Tissue debrided:  Dermis, Epidermis and Subcutaneous    Devitalized tissue debrided:  Biofilm, Callus and Necrotic/Eschar    Instruments:  Blade, Curette and Nippers     Bleeding:  Minimal  Hemostasis Achieved: Yes    Method Used:  Pressure  Patient tolerance:  Patient tolerated the procedure well with no immediate complications    The nature of the condition, options for management, as well as potential risks and complications were discussed in detail with patient. Patient was amenable to my recommendations and left my office fully informed and will follow up as instructed or sooner if necessary.      No change in wound this visit.  Discussed metatarsal head excision with gastrocnemius recession, right.  Consent signed today.  Follow-up in 2 weeks.              "

## 2022-11-01 ENCOUNTER — TELEPHONE (OUTPATIENT)
Dept: PODIATRY | Facility: CLINIC | Age: 57
End: 2022-11-01
Payer: COMMERCIAL

## 2022-11-01 NOTE — TELEPHONE ENCOUNTER
Left detail message please call the office to schedule with Dr. Melara he will not be in clinic tomorrow at your scheduled appointment time

## 2022-11-01 NOTE — TELEPHONE ENCOUNTER
Left detail message Dr. Melara will not be in at 8am tomorrow please call regarding appointment time change later on 11/02 or 11/03 at 8am double book

## 2022-11-02 ENCOUNTER — OFFICE VISIT (OUTPATIENT)
Dept: PODIATRY | Facility: CLINIC | Age: 57
End: 2022-11-02
Payer: COMMERCIAL

## 2022-11-02 VITALS
HEIGHT: 74 IN | HEART RATE: 56 BPM | DIASTOLIC BLOOD PRESSURE: 86 MMHG | BODY MASS INDEX: 36.01 KG/M2 | WEIGHT: 280.63 LBS | SYSTOLIC BLOOD PRESSURE: 147 MMHG

## 2022-11-02 DIAGNOSIS — M21.961 METATARSAL DEFORMITY, RIGHT: ICD-10-CM

## 2022-11-02 DIAGNOSIS — E11.49 TYPE II DIABETES MELLITUS WITH NEUROLOGICAL MANIFESTATIONS: ICD-10-CM

## 2022-11-02 DIAGNOSIS — L97.512 FOOT ULCER, RIGHT, WITH FAT LAYER EXPOSED: Primary | ICD-10-CM

## 2022-11-02 PROCEDURE — 1159F PR MEDICATION LIST DOCUMENTED IN MEDICAL RECORD: ICD-10-PCS | Mod: CPTII,S$GLB,, | Performed by: PODIATRIST

## 2022-11-02 PROCEDURE — 3060F POS MICROALBUMINURIA REV: CPT | Mod: CPTII,S$GLB,, | Performed by: PODIATRIST

## 2022-11-02 PROCEDURE — 3051F PR MOST RECENT HEMOGLOBIN A1C LEVEL 7.0 - < 8.0%: ICD-10-PCS | Mod: CPTII,S$GLB,, | Performed by: PODIATRIST

## 2022-11-02 PROCEDURE — 3008F BODY MASS INDEX DOCD: CPT | Mod: CPTII,S$GLB,, | Performed by: PODIATRIST

## 2022-11-02 PROCEDURE — 4010F ACE/ARB THERAPY RXD/TAKEN: CPT | Mod: CPTII,S$GLB,, | Performed by: PODIATRIST

## 2022-11-02 PROCEDURE — 99999 PR PBB SHADOW E&M-EST. PATIENT-LVL IV: ICD-10-PCS | Mod: PBBFAC,,, | Performed by: PODIATRIST

## 2022-11-02 PROCEDURE — 3051F HG A1C>EQUAL 7.0%<8.0%: CPT | Mod: CPTII,S$GLB,, | Performed by: PODIATRIST

## 2022-11-02 PROCEDURE — 3077F PR MOST RECENT SYSTOLIC BLOOD PRESSURE >= 140 MM HG: ICD-10-PCS | Mod: CPTII,S$GLB,, | Performed by: PODIATRIST

## 2022-11-02 PROCEDURE — 3066F PR DOCUMENTATION OF TREATMENT FOR NEPHROPATHY: ICD-10-PCS | Mod: CPTII,S$GLB,, | Performed by: PODIATRIST

## 2022-11-02 PROCEDURE — 3079F PR MOST RECENT DIASTOLIC BLOOD PRESSURE 80-89 MM HG: ICD-10-PCS | Mod: CPTII,S$GLB,, | Performed by: PODIATRIST

## 2022-11-02 PROCEDURE — 99213 OFFICE O/P EST LOW 20 MIN: CPT | Mod: 25,S$GLB,, | Performed by: PODIATRIST

## 2022-11-02 PROCEDURE — 3079F DIAST BP 80-89 MM HG: CPT | Mod: CPTII,S$GLB,, | Performed by: PODIATRIST

## 2022-11-02 PROCEDURE — 4010F PR ACE/ARB THEARPY RXD/TAKEN: ICD-10-PCS | Mod: CPTII,S$GLB,, | Performed by: PODIATRIST

## 2022-11-02 PROCEDURE — 99999 PR PBB SHADOW E&M-EST. PATIENT-LVL IV: CPT | Mod: PBBFAC,,, | Performed by: PODIATRIST

## 2022-11-02 PROCEDURE — 99213 PR OFFICE/OUTPT VISIT, EST, LEVL III, 20-29 MIN: ICD-10-PCS | Mod: 25,S$GLB,, | Performed by: PODIATRIST

## 2022-11-02 PROCEDURE — 11042 PR DEBRIDEMENT, SKIN, SUB-Q TISSUE,=<20 SQ CM: ICD-10-PCS | Mod: S$GLB,,, | Performed by: PODIATRIST

## 2022-11-02 PROCEDURE — 1159F MED LIST DOCD IN RCRD: CPT | Mod: CPTII,S$GLB,, | Performed by: PODIATRIST

## 2022-11-02 PROCEDURE — 11042 DBRDMT SUBQ TIS 1ST 20SQCM/<: CPT | Mod: S$GLB,,, | Performed by: PODIATRIST

## 2022-11-02 PROCEDURE — 3008F PR BODY MASS INDEX (BMI) DOCUMENTED: ICD-10-PCS | Mod: CPTII,S$GLB,, | Performed by: PODIATRIST

## 2022-11-02 PROCEDURE — 3077F SYST BP >= 140 MM HG: CPT | Mod: CPTII,S$GLB,, | Performed by: PODIATRIST

## 2022-11-02 PROCEDURE — 3066F NEPHROPATHY DOC TX: CPT | Mod: CPTII,S$GLB,, | Performed by: PODIATRIST

## 2022-11-02 PROCEDURE — 3060F PR POS MICROALBUMINURIA RESULT DOCUMENTED/REVIEW: ICD-10-PCS | Mod: CPTII,S$GLB,, | Performed by: PODIATRIST

## 2022-11-03 ENCOUNTER — TELEPHONE (OUTPATIENT)
Dept: PODIATRY | Facility: CLINIC | Age: 57
End: 2022-11-03
Payer: COMMERCIAL

## 2022-11-10 ENCOUNTER — LAB VISIT (OUTPATIENT)
Dept: LAB | Facility: HOSPITAL | Age: 57
End: 2022-11-10
Payer: COMMERCIAL

## 2022-11-10 DIAGNOSIS — Z79.4 TYPE 2 DIABETES MELLITUS WITH BOTH EYES AFFECTED BY MODERATE NONPROLIFERATIVE RETINOPATHY WITHOUT MACULAR EDEMA, WITH LONG-TERM CURRENT USE OF INSULIN: ICD-10-CM

## 2022-11-10 DIAGNOSIS — E11.3393 TYPE 2 DIABETES MELLITUS WITH BOTH EYES AFFECTED BY MODERATE NONPROLIFERATIVE RETINOPATHY WITHOUT MACULAR EDEMA, WITH LONG-TERM CURRENT USE OF INSULIN: ICD-10-CM

## 2022-11-10 DIAGNOSIS — I15.2 HYPERTENSION ASSOCIATED WITH DIABETES: Chronic | ICD-10-CM

## 2022-11-10 DIAGNOSIS — E11.59 HYPERTENSION ASSOCIATED WITH DIABETES: Chronic | ICD-10-CM

## 2022-11-10 LAB
ALBUMIN SERPL BCP-MCNC: 3.8 G/DL (ref 3.5–5.2)
ALP SERPL-CCNC: 115 U/L (ref 55–135)
ALT SERPL W/O P-5'-P-CCNC: 30 U/L (ref 10–44)
ANION GAP SERPL CALC-SCNC: 9 MMOL/L (ref 8–16)
AST SERPL-CCNC: 23 U/L (ref 10–40)
BILIRUB SERPL-MCNC: 0.7 MG/DL (ref 0.1–1)
BUN SERPL-MCNC: 22 MG/DL (ref 6–20)
CALCIUM SERPL-MCNC: 9.6 MG/DL (ref 8.7–10.5)
CHLORIDE SERPL-SCNC: 103 MMOL/L (ref 95–110)
CHOLEST SERPL-MCNC: 121 MG/DL (ref 120–199)
CHOLEST/HDLC SERPL: 3.6 {RATIO} (ref 2–5)
CO2 SERPL-SCNC: 26 MMOL/L (ref 23–29)
CREAT SERPL-MCNC: 1 MG/DL (ref 0.5–1.4)
EST. GFR  (NO RACE VARIABLE): >60 ML/MIN/1.73 M^2
ESTIMATED AVG GLUCOSE: 157 MG/DL (ref 68–131)
GLUCOSE SERPL-MCNC: 99 MG/DL (ref 70–110)
HBA1C MFR BLD: 7.1 % (ref 4–5.6)
HDLC SERPL-MCNC: 34 MG/DL (ref 40–75)
HDLC SERPL: 28.1 % (ref 20–50)
LDLC SERPL CALC-MCNC: 57.4 MG/DL (ref 63–159)
NONHDLC SERPL-MCNC: 87 MG/DL
POTASSIUM SERPL-SCNC: 4.7 MMOL/L (ref 3.5–5.1)
PROT SERPL-MCNC: 7.7 G/DL (ref 6–8.4)
SODIUM SERPL-SCNC: 138 MMOL/L (ref 136–145)
TRIGL SERPL-MCNC: 148 MG/DL (ref 30–150)

## 2022-11-10 PROCEDURE — 83036 HEMOGLOBIN GLYCOSYLATED A1C: CPT | Performed by: NURSE PRACTITIONER

## 2022-11-10 PROCEDURE — 80061 LIPID PANEL: CPT | Performed by: NURSE PRACTITIONER

## 2022-11-10 PROCEDURE — 80053 COMPREHEN METABOLIC PANEL: CPT | Performed by: NURSE PRACTITIONER

## 2022-11-10 PROCEDURE — 36415 COLL VENOUS BLD VENIPUNCTURE: CPT | Mod: PO | Performed by: NURSE PRACTITIONER

## 2022-11-13 NOTE — PROGRESS NOTES
Subjective:      Patient ID: Billy Rivera is a 57 y.o. male.    Chief Complaint: Wound Check and Follow-up (No pain reported DM Shree Baker PCP 12/21/22)    Billy is a 57 y.o. male who presents to the clinic for evaluation and treatment of high risk feet. Billy has a past medical history of Allergy, CAD (coronary artery disease), native coronary artery (6/25/2013), Cancer, COVID-19 virus detected (9/1/2020), Diabetes mellitus, Diabetes mellitus, type 2, Disorder of kidney and ureter, Heart attack (04/2012), colon cancer, stage I, Hyperlipidemia, Hypertension, Muscular pain, NSTEMI May 2013 - peak troponin 0.22 (5/22/2013), MICHAELA (obstructive sleep apnea), and Retinopathy due to secondary diabetes. The patient's chief complaint is foot ulcer both feet.   No other new complaints today, has been changing bandages at home with Iodosorb and offloading foam.  PCP: BRAD Baker MD    Date Last Seen by PCP: 3/23/2022    Current shoe gear:  Affected Foot: DARCO Shoe right foot     Affected Foot: Tennis shoe left foot    History of Trauma: negative  Sign of Infection: none    Hemoglobin A1C   Date Value Ref Range Status   11/10/2022 7.1 (H) 4.0 - 5.6 % Final     Comment:     ADA Screening Guidelines:  5.7-6.4%  Consistent with prediabetes  >or=6.5%  Consistent with diabetes    High levels of fetal hemoglobin interfere with the HbA1C  assay. Heterozygous hemoglobin variants (HbS, HgC, etc)do  not significantly interfere with this assay.   However, presence of multiple variants may affect accuracy.     08/09/2022 8.0 (H) 4.0 - 5.6 % Final     Comment:     ADA Screening Guidelines:  5.7-6.4%  Consistent with prediabetes  >or=6.5%  Consistent with diabetes    High levels of fetal hemoglobin interfere with the HbA1C  assay. Heterozygous hemoglobin variants (HbS, HgC, etc)do  not significantly interfere with this assay.   However, presence of multiple variants may affect accuracy.     06/08/2022 10.0 (H) 4.0 -  5.6 % Final     Comment:     ADA Screening Guidelines:  5.7-6.4%  Consistent with prediabetes  >or=6.5%  Consistent with diabetes    High levels of fetal hemoglobin interfere with the HbA1C  assay. Heterozygous hemoglobin variants (HbS, HgC, etc)do  not significantly interfere with this assay.   However, presence of multiple variants may affect accuracy.         Review of Systems   Constitutional: Negative for chills, decreased appetite, fever, malaise/fatigue and night sweats.   HENT:  Negative for congestion, hearing loss, nosebleeds and tinnitus.    Eyes:  Negative for double vision, pain, photophobia and visual disturbance.   Cardiovascular:  Negative for chest pain, claudication, cyanosis and leg swelling.   Respiratory:  Negative for cough, hemoptysis, shortness of breath and wheezing.    Endocrine: Negative for cold intolerance and heat intolerance.   Hematologic/Lymphatic: Negative for adenopathy and bleeding problem.   Skin:  Positive for poor wound healing. Negative for color change, dry skin, itching, nail changes, rash and suspicious lesions.   Musculoskeletal:  Negative for arthritis, falls, gout, joint pain, myalgias and stiffness.   Gastrointestinal:  Negative for abdominal pain, jaundice, nausea and vomiting.   Genitourinary:  Negative for dysuria, frequency and hematuria.   Neurological:  Positive for numbness and sensory change. Negative for difficulty with concentration, loss of balance and paresthesias.   Psychiatric/Behavioral:  Negative for altered mental status, hallucinations and suicidal ideas. The patient is not nervous/anxious.    Allergic/Immunologic: Negative for environmental allergies and persistent infections.         Objective:        Physical Exam  Constitutional:       General: He is not in acute distress.     Appearance: Normal appearance.   Cardiovascular:      Pulses:           Dorsalis pedis pulses are 2+ on the right side and 2+ on the left side.        Posterior tibial pulses  are 2+ on the right side and 2+ on the left side.      Comments: CFT< 3 secs all toes bilateral foot, skin temp warm to cool bilateral foot, no hair growth bilateral lower extremity, no edema to bilateral lower extremities    Musculoskeletal:         General: No swelling.      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     5/5 muscle strength with DF/PF/Inv/Ev bilateral.    Inspection and palpation of the joints and bones reveal no crepitus or joint effusion. No tenderness upon palpation.  Angle and base of gait are normal.    Skin:     General: Skin is warm and dry.      Capillary Refill: Capillary refill takes 2 to 3 seconds.      Findings: No erythema.      Comments: Ulceration location:  Right plantar 2nd MPJ measuring 0.5 cm in diameter by 0.2 cm in depth post debridement.  Pre debridement measuring 0.7 cm in diameter by 0.2 cm in depth.   Neurological:      Mental Status: He is alert and oriented to person, place, and time.      Sensory: Sensory deficit present.      Motor: No weakness.      Comments: Diminished/loss of protective sensation all toes bilateral to 10 gram monofilament.   Psychiatric:         Mood and Affect: Mood normal.         Thought Content: Thought content normal.             Assessment:       Encounter Diagnoses   Name Primary?    Foot ulcer, right, with fat layer exposed Yes    Type II diabetes mellitus with neurological manifestations     Metatarsal deformity, right            Plan:       Billy was seen today for wound check and follow-up.    Diagnoses and all orders for this visit:    Foot ulcer, right, with fat layer exposed    Type II diabetes mellitus with neurological manifestations    Metatarsal deformity, right      I counseled the patient on his conditions, their implications and medical management.    Wound Debridement    Performed by: Mingo Melara DPM   Authorized by: Patient    Time out: Immediately prior to procedure a  ""time out" was called to verify the correct patient, procedure, equipment, support staff and site/side marked as required.   Consent Done?:  Yes (Verbal)  Local anesthesia used?: No       Wound Details:    Location:   plantar right foot     Type of Debridement:  Excisional       Length (cm): 0.7       Width (cm):   0.7       Depth (cm):   0.2       Percent Debrided (%):  100             Depth of debridement:  Subcutaneous tissue    Tissue debrided:  Dermis, Epidermis and Subcutaneous    Devitalized tissue debrided:  Biofilm, Callus and Necrotic/Eschar    Instruments:  Blade, Curette and Nippers     Bleeding:  Minimal  Hemostasis Achieved: Yes    Method Used:  Pressure  Patient tolerance:  Patient tolerated the procedure well with no immediate complications    The nature of the condition, options for management, as well as potential risks and complications were discussed in detail with patient. Patient was amenable to my recommendations and left my office fully informed and will follow up as instructed or sooner if necessary.      Again discussed metatarsal head excision with gastrocnemius recession, right. Follow-up in 2 weeks.                "

## 2022-11-15 ENCOUNTER — OFFICE VISIT (OUTPATIENT)
Dept: PODIATRY | Facility: CLINIC | Age: 57
End: 2022-11-15
Payer: COMMERCIAL

## 2022-11-15 ENCOUNTER — TELEPHONE (OUTPATIENT)
Dept: PODIATRY | Facility: CLINIC | Age: 57
End: 2022-11-15
Payer: COMMERCIAL

## 2022-11-15 ENCOUNTER — PATIENT MESSAGE (OUTPATIENT)
Dept: INTERNAL MEDICINE | Facility: CLINIC | Age: 57
End: 2022-11-15
Payer: COMMERCIAL

## 2022-11-15 VITALS
HEIGHT: 74 IN | BODY MASS INDEX: 36.01 KG/M2 | SYSTOLIC BLOOD PRESSURE: 101 MMHG | DIASTOLIC BLOOD PRESSURE: 65 MMHG | WEIGHT: 280.63 LBS | HEART RATE: 62 BPM

## 2022-11-15 DIAGNOSIS — M86.9 OSTEOMYELITIS, UNSPECIFIED SITE, UNSPECIFIED TYPE: ICD-10-CM

## 2022-11-15 DIAGNOSIS — L97.512 FOOT ULCER, RIGHT, WITH FAT LAYER EXPOSED: Primary | ICD-10-CM

## 2022-11-15 PROCEDURE — 88311 DECALCIFY TISSUE: CPT | Mod: 26,,, | Performed by: PATHOLOGY

## 2022-11-15 PROCEDURE — 3008F BODY MASS INDEX DOCD: CPT | Mod: CPTII,S$GLB,, | Performed by: PODIATRIST

## 2022-11-15 PROCEDURE — 99999 PR PBB SHADOW E&M-EST. PATIENT-LVL IV: ICD-10-PCS | Mod: PBBFAC,,, | Performed by: PODIATRIST

## 2022-11-15 PROCEDURE — 88305 TISSUE EXAM BY PATHOLOGIST: CPT | Mod: 26,,, | Performed by: PATHOLOGY

## 2022-11-15 PROCEDURE — 3060F POS MICROALBUMINURIA REV: CPT | Mod: CPTII,S$GLB,, | Performed by: PODIATRIST

## 2022-11-15 PROCEDURE — 3008F PR BODY MASS INDEX (BMI) DOCUMENTED: ICD-10-PCS | Mod: CPTII,S$GLB,, | Performed by: PODIATRIST

## 2022-11-15 PROCEDURE — 88311 DECALCIFY TISSUE: CPT | Performed by: PATHOLOGY

## 2022-11-15 PROCEDURE — 3066F NEPHROPATHY DOC TX: CPT | Mod: CPTII,S$GLB,, | Performed by: PODIATRIST

## 2022-11-15 PROCEDURE — 3066F PR DOCUMENTATION OF TREATMENT FOR NEPHROPATHY: ICD-10-PCS | Mod: CPTII,S$GLB,, | Performed by: PODIATRIST

## 2022-11-15 PROCEDURE — 87070 CULTURE OTHR SPECIMN AEROBIC: CPT | Performed by: PODIATRIST

## 2022-11-15 PROCEDURE — 28124 PR PART REMV PHALANX OF TOE: ICD-10-PCS | Mod: T7,S$GLB,, | Performed by: PODIATRIST

## 2022-11-15 PROCEDURE — 11043 PR DEBRIDEMENT, SKIN, SUB-Q TISSUE,MUSCLE,=<20 SQ CM: ICD-10-PCS | Mod: 59,S$GLB,, | Performed by: PODIATRIST

## 2022-11-15 PROCEDURE — 87075 CULTR BACTERIA EXCEPT BLOOD: CPT | Performed by: PODIATRIST

## 2022-11-15 PROCEDURE — 87186 SC STD MICRODIL/AGAR DIL: CPT | Performed by: PODIATRIST

## 2022-11-15 PROCEDURE — 28124 PARTIAL REMOVAL OF TOE: CPT | Mod: T7,S$GLB,, | Performed by: PODIATRIST

## 2022-11-15 PROCEDURE — 3051F HG A1C>EQUAL 7.0%<8.0%: CPT | Mod: CPTII,S$GLB,, | Performed by: PODIATRIST

## 2022-11-15 PROCEDURE — 4010F ACE/ARB THERAPY RXD/TAKEN: CPT | Mod: CPTII,S$GLB,, | Performed by: PODIATRIST

## 2022-11-15 PROCEDURE — 99214 OFFICE O/P EST MOD 30 MIN: CPT | Mod: 25,57,S$GLB, | Performed by: PODIATRIST

## 2022-11-15 PROCEDURE — 3078F DIAST BP <80 MM HG: CPT | Mod: CPTII,S$GLB,, | Performed by: PODIATRIST

## 2022-11-15 PROCEDURE — 3051F PR MOST RECENT HEMOGLOBIN A1C LEVEL 7.0 - < 8.0%: ICD-10-PCS | Mod: CPTII,S$GLB,, | Performed by: PODIATRIST

## 2022-11-15 PROCEDURE — 3074F SYST BP LT 130 MM HG: CPT | Mod: CPTII,S$GLB,, | Performed by: PODIATRIST

## 2022-11-15 PROCEDURE — 4010F PR ACE/ARB THEARPY RXD/TAKEN: ICD-10-PCS | Mod: CPTII,S$GLB,, | Performed by: PODIATRIST

## 2022-11-15 PROCEDURE — 88305 TISSUE EXAM BY PATHOLOGIST: CPT | Performed by: PATHOLOGY

## 2022-11-15 PROCEDURE — 99999 PR PBB SHADOW E&M-EST. PATIENT-LVL IV: CPT | Mod: PBBFAC,,, | Performed by: PODIATRIST

## 2022-11-15 PROCEDURE — 87077 CULTURE AEROBIC IDENTIFY: CPT | Performed by: PODIATRIST

## 2022-11-15 PROCEDURE — 3074F PR MOST RECENT SYSTOLIC BLOOD PRESSURE < 130 MM HG: ICD-10-PCS | Mod: CPTII,S$GLB,, | Performed by: PODIATRIST

## 2022-11-15 PROCEDURE — 88311 PR  DECALCIFY TISSUE: ICD-10-PCS | Mod: 26,,, | Performed by: PATHOLOGY

## 2022-11-15 PROCEDURE — 3078F PR MOST RECENT DIASTOLIC BLOOD PRESSURE < 80 MM HG: ICD-10-PCS | Mod: CPTII,S$GLB,, | Performed by: PODIATRIST

## 2022-11-15 PROCEDURE — 88305 TISSUE EXAM BY PATHOLOGIST: ICD-10-PCS | Mod: 26,,, | Performed by: PATHOLOGY

## 2022-11-15 PROCEDURE — 11043 DBRDMT MUSC&/FSCA 1ST 20/<: CPT | Mod: 59,S$GLB,, | Performed by: PODIATRIST

## 2022-11-15 PROCEDURE — 3060F PR POS MICROALBUMINURIA RESULT DOCUMENTED/REVIEW: ICD-10-PCS | Mod: CPTII,S$GLB,, | Performed by: PODIATRIST

## 2022-11-15 PROCEDURE — 99214 PR OFFICE/OUTPT VISIT, EST, LEVL IV, 30-39 MIN: ICD-10-PCS | Mod: 25,57,S$GLB, | Performed by: PODIATRIST

## 2022-11-15 RX ORDER — CIPROFLOXACIN 500 MG/1
500 TABLET ORAL 2 TIMES DAILY
Qty: 20 TABLET | Refills: 0 | Status: ON HOLD | OUTPATIENT
Start: 2022-11-15 | End: 2022-11-28 | Stop reason: HOSPADM

## 2022-11-15 RX ORDER — DOXYCYCLINE HYCLATE 100 MG
100 TABLET ORAL 2 TIMES DAILY
Qty: 14 TABLET | Refills: 0 | Status: ON HOLD | OUTPATIENT
Start: 2022-11-15 | End: 2022-11-25

## 2022-11-15 NOTE — PROGRESS NOTES
Subjective:      Patient ID: Billy Rivera is a 57 y.o. male.    Chief Complaint: Wound Check (MAX) and Diabetes Mellitus    Billy is a 57 y.o. male who presents to the clinic for evaluation and treatment of high risk feet. Billy has a past medical history of Allergy, CAD (coronary artery disease), native coronary artery (6/25/2013), Cancer, COVID-19 virus detected (9/1/2020), Diabetes mellitus, Diabetes mellitus, type 2, Disorder of kidney and ureter, Heart attack (04/2012), colon cancer, stage I, Hyperlipidemia, Hypertension, Muscular pain, NSTEMI May 2013 - peak troponin 0.22 (5/22/2013), MICHAELA (obstructive sleep apnea), and Retinopathy due to secondary diabetes. The patient's chief complaint is foot ulcer both feet.   No other new complaints today, has been changing bandages at home with Iodosorb and offloading foam.  Increased redness with some swelling and malodor to the right 3rd digit.  PCP: BRAD Baker MD    Date Last Seen by PCP: 3/23/2022    Current shoe gear:  Affected Foot: DARCO Shoe right foot     Affected Foot: Tennis shoe left foot    History of Trauma: negative  Sign of Infection: none    Hemoglobin A1C   Date Value Ref Range Status   11/10/2022 7.1 (H) 4.0 - 5.6 % Final     Comment:     ADA Screening Guidelines:  5.7-6.4%  Consistent with prediabetes  >or=6.5%  Consistent with diabetes    High levels of fetal hemoglobin interfere with the HbA1C  assay. Heterozygous hemoglobin variants (HbS, HgC, etc)do  not significantly interfere with this assay.   However, presence of multiple variants may affect accuracy.     08/09/2022 8.0 (H) 4.0 - 5.6 % Final     Comment:     ADA Screening Guidelines:  5.7-6.4%  Consistent with prediabetes  >or=6.5%  Consistent with diabetes    High levels of fetal hemoglobin interfere with the HbA1C  assay. Heterozygous hemoglobin variants (HbS, HgC, etc)do  not significantly interfere with this assay.   However, presence of multiple variants may affect  accuracy.     06/08/2022 10.0 (H) 4.0 - 5.6 % Final     Comment:     ADA Screening Guidelines:  5.7-6.4%  Consistent with prediabetes  >or=6.5%  Consistent with diabetes    High levels of fetal hemoglobin interfere with the HbA1C  assay. Heterozygous hemoglobin variants (HbS, HgC, etc)do  not significantly interfere with this assay.   However, presence of multiple variants may affect accuracy.         Review of Systems   Constitutional: Negative for chills, decreased appetite, fever, malaise/fatigue and night sweats.   HENT:  Negative for congestion, hearing loss, nosebleeds and tinnitus.    Eyes:  Negative for double vision, pain, photophobia and visual disturbance.   Cardiovascular:  Negative for chest pain, claudication, cyanosis and leg swelling.   Respiratory:  Negative for cough, hemoptysis, shortness of breath and wheezing.    Endocrine: Negative for cold intolerance and heat intolerance.   Hematologic/Lymphatic: Negative for adenopathy and bleeding problem.   Skin:  Positive for poor wound healing. Negative for color change, dry skin, itching, nail changes, rash and suspicious lesions.   Musculoskeletal:  Negative for arthritis, falls, gout, joint pain, myalgias and stiffness.   Gastrointestinal:  Negative for abdominal pain, jaundice, nausea and vomiting.   Genitourinary:  Negative for dysuria, frequency and hematuria.   Neurological:  Positive for numbness and sensory change. Negative for difficulty with concentration, loss of balance and paresthesias.   Psychiatric/Behavioral:  Negative for altered mental status, hallucinations and suicidal ideas. The patient is not nervous/anxious.    Allergic/Immunologic: Negative for environmental allergies and persistent infections.         Objective:        Physical Exam  Constitutional:       General: He is not in acute distress.     Appearance: Normal appearance.   Cardiovascular:      Pulses:           Dorsalis pedis pulses are 2+ on the right side and 2+ on the  left side.        Posterior tibial pulses are 2+ on the right side and 2+ on the left side.      Comments: CFT< 3 secs all toes bilateral foot, skin temp warm to cool bilateral foot, no hair growth bilateral lower extremity, no edema to bilateral lower extremities    Musculoskeletal:         General: No swelling.      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     5/5 muscle strength with DF/PF/Inv/Ev bilateral.    Inspection and palpation of the joints and bones reveal no crepitus or joint effusion. No tenderness upon palpation.  Angle and base of gait are normal.    Skin:     General: Skin is warm and dry.      Capillary Refill: Capillary refill takes 2 to 3 seconds.      Findings: No erythema.      Comments: Ulceration location:  Right plantar 2nd MPJ measuring 0.5 cm in diameter by 0.2 cm in depth post debridement.  Pre debridement measuring 0.7 cm in diameter by 0.2 cm in depth.  Right 3rd digit noted to be edematous with a lateral full-thickness ulceration through the level of muscle to bone tissue.  This measures 0.5 cm in diameter by 0.3 cm in depth.   Neurological:      Mental Status: He is alert and oriented to person, place, and time.      Sensory: Sensory deficit present.      Motor: No weakness.      Comments: Diminished/loss of protective sensation all toes bilateral to 10 gram monofilament.   Psychiatric:         Mood and Affect: Mood normal.         Thought Content: Thought content normal.             Assessment:       Encounter Diagnoses   Name Primary?    Foot ulcer, right, with fat layer exposed Yes    Osteomyelitis, unspecified site, unspecified type            Plan:       Billy was seen today for wound check and diabetes mellitus.    Diagnoses and all orders for this visit:    Foot ulcer, right, with fat layer exposed  -     Aerobic culture  -     Culture, Anaerobic  -     Specimen to Pathology Orthopedics  -     Ambulatory referral/consult to  "Infectious Disease; Future    Osteomyelitis, unspecified site, unspecified type  -     Aerobic culture  -     Culture, Anaerobic  -     Specimen to Pathology Orthopedics  -     Ambulatory referral/consult to Infectious Disease; Future    Other orders  -     ciprofloxacin HCl (CIPRO) 500 MG tablet; Take 1 tablet (500 mg total) by mouth 2 (two) times daily.  -     doxycycline (VIBRA-TABS) 100 MG tablet; Take 1 tablet (100 mg total) by mouth 2 (two) times daily.      I counseled the patient on his conditions, their implications and medical management.    Wound Debridement    Performed by: Mingo Melara DPM   Authorized by: Patient    Time out: Immediately prior to procedure a "time out" was called to verify the correct patient, procedure, equipment, support staff and site/side marked as required.   Consent Done?:  Yes (Verbal)  Local anesthesia used?: No       Wound Details:    Location:   Right 3rd digit     Type of Debridement:  Excisional       Length (cm): 0.5       Width (cm):   0.5       Depth (cm):   0.3       Percent Debrided (%):  100             Depth of debridement:  Subcutaneous tissue, muscle tissue    Tissue debrided:  Dermis, Epidermis and Subcutaneous, muscle tissue    Devitalized tissue debrided:  Biofilm, Callus and Necrotic/Eschar    Instruments:  Blade, Curette and Nippers     Bleeding:  Minimal  Hemostasis Achieved: Yes    Method Used:  Pressure  Patient tolerance:  Patient tolerated the procedure well with no immediate complications    Bone Biopsy  Procedure Note          Indication/Diagnosis:  Ulceration probable osteomyelitis right 3rd digit    Consent Type:  Verbal and written     Time Out Completed: yes    Aseptic Technique: Betadine    Anesthesia:  None necessary    Anesthetic Drugs Used:  None necessary due to neuropathy    Instrumentation:  Rongeur/bone cutter     Procedure Site:  Right 3rd digit    Complications: none    EBL :  Minimal    Specimen :  Bone sent to pathology and " microbiology     Right 3rd digit was prepped scrubbed and draped sterilely.  No anesthetic necessary due to level neuropathy.  A rongeur and bone cutter were utilized to resect bone tissue from the lateral aspect of the right 3rd digit.  This was sent to pathology and microbiology.  Patient tolerated procedure well no complications.  Follow-up in 1 week.  Begin antibiotic therapy may require hospitalization if not improving.

## 2022-11-15 NOTE — TELEPHONE ENCOUNTER
Spoke with patient, scheduled appointment to be seen this afternoon 11/15/2022 @ 2:15 pm.        Pt requesting call back RE: pt believes that his wound is infected and would like to get in today.

## 2022-11-15 NOTE — TELEPHONE ENCOUNTER
----- Message from Jimenez Rivera sent at 11/15/2022  8:00 AM CST -----  Contact: pt  Pt requesting call back RE: pt believes that his wound is infected and would like to get in today.        Confirmed contact below:  Contact Name:Billy Rivera  Phone Number: [phone]

## 2022-11-19 LAB
BACTERIA SPEC AEROBE CULT: ABNORMAL
BACTERIA SPEC AEROBE CULT: ABNORMAL

## 2022-11-21 LAB — BACTERIA SPEC ANAEROBE CULT: NORMAL

## 2022-11-22 ENCOUNTER — OFFICE VISIT (OUTPATIENT)
Dept: PODIATRY | Facility: CLINIC | Age: 57
End: 2022-11-22
Payer: COMMERCIAL

## 2022-11-22 ENCOUNTER — HOSPITAL ENCOUNTER (INPATIENT)
Facility: HOSPITAL | Age: 57
LOS: 6 days | Discharge: HOME HEALTH | DRG: 617 | End: 2022-11-28
Attending: STUDENT IN AN ORGANIZED HEALTH CARE EDUCATION/TRAINING PROGRAM | Admitting: HOSPITALIST
Payer: COMMERCIAL

## 2022-11-22 VITALS
DIASTOLIC BLOOD PRESSURE: 71 MMHG | SYSTOLIC BLOOD PRESSURE: 112 MMHG | HEART RATE: 61 BPM | BODY MASS INDEX: 36.03 KG/M2 | WEIGHT: 280.63 LBS

## 2022-11-22 DIAGNOSIS — M86.271 SUBACUTE OSTEOMYELITIS OF RIGHT FOOT: Primary | ICD-10-CM

## 2022-11-22 DIAGNOSIS — M79.671 RIGHT FOOT PAIN: ICD-10-CM

## 2022-11-22 DIAGNOSIS — L08.9 DIABETIC FOOT INFECTION: ICD-10-CM

## 2022-11-22 DIAGNOSIS — E11.9 TYPE 2 DIABETES MELLITUS WITHOUT COMPLICATION, WITH LONG-TERM CURRENT USE OF INSULIN: ICD-10-CM

## 2022-11-22 DIAGNOSIS — L97.512 FOOT ULCER, RIGHT, WITH FAT LAYER EXPOSED: Primary | ICD-10-CM

## 2022-11-22 DIAGNOSIS — E11.51 TYPE 2 DIABETES MELLITUS WITH DIABETIC PERIPHERAL ANGIOPATHY WITHOUT GANGRENE, WITH LONG-TERM CURRENT USE OF INSULIN: ICD-10-CM

## 2022-11-22 DIAGNOSIS — E11.59 HYPERTENSION ASSOCIATED WITH DIABETES: Chronic | ICD-10-CM

## 2022-11-22 DIAGNOSIS — M86.9 OSTEOMYELITIS, UNSPECIFIED SITE, UNSPECIFIED TYPE: ICD-10-CM

## 2022-11-22 DIAGNOSIS — I15.2 HYPERTENSION ASSOCIATED WITH DIABETES: Chronic | ICD-10-CM

## 2022-11-22 DIAGNOSIS — E11.3393 TYPE 2 DIABETES MELLITUS WITH BOTH EYES AFFECTED BY MODERATE NONPROLIFERATIVE RETINOPATHY WITHOUT MACULAR EDEMA, WITH LONG-TERM CURRENT USE OF INSULIN: ICD-10-CM

## 2022-11-22 DIAGNOSIS — L97.403 DIABETIC ULCER OF HEEL ASSOCIATED WITH TYPE 2 DIABETES MELLITUS, WITH NECROSIS OF MUSCLE, UNSPECIFIED LATERALITY: ICD-10-CM

## 2022-11-22 DIAGNOSIS — I73.9 PERIPHERAL ARTERIAL DISEASE: ICD-10-CM

## 2022-11-22 DIAGNOSIS — L97.514 ULCER OF TOE, RIGHT, WITH NECROSIS OF BONE: ICD-10-CM

## 2022-11-22 DIAGNOSIS — Z79.4 TYPE 2 DIABETES MELLITUS WITH DIABETIC PERIPHERAL ANGIOPATHY WITHOUT GANGRENE, WITH LONG-TERM CURRENT USE OF INSULIN: ICD-10-CM

## 2022-11-22 DIAGNOSIS — Z79.4 TYPE 2 DIABETES MELLITUS WITHOUT COMPLICATION, WITH LONG-TERM CURRENT USE OF INSULIN: ICD-10-CM

## 2022-11-22 DIAGNOSIS — E11.49 TYPE II DIABETES MELLITUS WITH NEUROLOGICAL MANIFESTATIONS: ICD-10-CM

## 2022-11-22 DIAGNOSIS — E11.621 DIABETIC ULCER OF HEEL ASSOCIATED WITH TYPE 2 DIABETES MELLITUS, WITH NECROSIS OF MUSCLE, UNSPECIFIED LATERALITY: ICD-10-CM

## 2022-11-22 DIAGNOSIS — E11.628 DIABETIC FOOT INFECTION: ICD-10-CM

## 2022-11-22 DIAGNOSIS — R07.9 CHEST PAIN: ICD-10-CM

## 2022-11-22 DIAGNOSIS — Z79.4 TYPE 2 DIABETES MELLITUS WITH BOTH EYES AFFECTED BY MODERATE NONPROLIFERATIVE RETINOPATHY WITHOUT MACULAR EDEMA, WITH LONG-TERM CURRENT USE OF INSULIN: ICD-10-CM

## 2022-11-22 LAB
ALBUMIN SERPL BCP-MCNC: 3.4 G/DL (ref 3.5–5.2)
ALP SERPL-CCNC: 97 U/L (ref 55–135)
ALT SERPL W/O P-5'-P-CCNC: 26 U/L (ref 10–44)
ANION GAP SERPL CALC-SCNC: 13 MMOL/L (ref 8–16)
AST SERPL-CCNC: 21 U/L (ref 10–40)
BASOPHILS # BLD AUTO: 0.07 K/UL (ref 0–0.2)
BASOPHILS NFR BLD: 0.7 % (ref 0–1.9)
BILIRUB SERPL-MCNC: 0.6 MG/DL (ref 0.1–1)
BUN SERPL-MCNC: 20 MG/DL (ref 6–20)
CALCIUM SERPL-MCNC: 9.3 MG/DL (ref 8.7–10.5)
CHLORIDE SERPL-SCNC: 101 MMOL/L (ref 95–110)
CO2 SERPL-SCNC: 21 MMOL/L (ref 23–29)
CREAT SERPL-MCNC: 1 MG/DL (ref 0.5–1.4)
CRP SERPL-MCNC: 15.6 MG/L (ref 0–8.2)
DIFFERENTIAL METHOD: ABNORMAL
EOSINOPHIL # BLD AUTO: 0.3 K/UL (ref 0–0.5)
EOSINOPHIL NFR BLD: 2.4 % (ref 0–8)
ERYTHROCYTE [DISTWIDTH] IN BLOOD BY AUTOMATED COUNT: 12.7 % (ref 11.5–14.5)
ERYTHROCYTE [SEDIMENTATION RATE] IN BLOOD BY PHOTOMETRIC METHOD: 56 MM/HR (ref 0–23)
EST. GFR  (NO RACE VARIABLE): >60 ML/MIN/1.73 M^2
GLUCOSE SERPL-MCNC: 101 MG/DL (ref 70–110)
HCT VFR BLD AUTO: 43.2 % (ref 40–54)
HCV AB SERPL QL IA: NORMAL
HGB BLD-MCNC: 14.7 G/DL (ref 14–18)
HIV 1+2 AB+HIV1 P24 AG SERPL QL IA: NORMAL
IMM GRANULOCYTES # BLD AUTO: 0.07 K/UL (ref 0–0.04)
IMM GRANULOCYTES NFR BLD AUTO: 0.7 % (ref 0–0.5)
LACTATE SERPL-SCNC: 0.8 MMOL/L (ref 0.5–2.2)
LACTATE SERPL-SCNC: 2.3 MMOL/L (ref 0.5–2.2)
LYMPHOCYTES # BLD AUTO: 1.9 K/UL (ref 1–4.8)
LYMPHOCYTES NFR BLD: 17.9 % (ref 18–48)
MCH RBC QN AUTO: 29.8 PG (ref 27–31)
MCHC RBC AUTO-ENTMCNC: 34 G/DL (ref 32–36)
MCV RBC AUTO: 88 FL (ref 82–98)
MONOCYTES # BLD AUTO: 1.1 K/UL (ref 0.3–1)
MONOCYTES NFR BLD: 10.5 % (ref 4–15)
NEUTROPHILS # BLD AUTO: 7.2 K/UL (ref 1.8–7.7)
NEUTROPHILS NFR BLD: 67.8 % (ref 38–73)
NRBC BLD-RTO: 0 /100 WBC
PLATELET # BLD AUTO: 285 K/UL (ref 150–450)
PMV BLD AUTO: 9.3 FL (ref 9.2–12.9)
POCT GLUCOSE: 105 MG/DL (ref 70–110)
POTASSIUM SERPL-SCNC: 4.4 MMOL/L (ref 3.5–5.1)
PROT SERPL-MCNC: 7.7 G/DL (ref 6–8.4)
RBC # BLD AUTO: 4.93 M/UL (ref 4.6–6.2)
SARS-COV-2 RDRP RESP QL NAA+PROBE: NEGATIVE
SODIUM SERPL-SCNC: 135 MMOL/L (ref 136–145)
WBC # BLD AUTO: 10.62 K/UL (ref 3.9–12.7)

## 2022-11-22 PROCEDURE — 99223 1ST HOSP IP/OBS HIGH 75: CPT | Mod: ,,, | Performed by: HOSPITALIST

## 2022-11-22 PROCEDURE — 4010F ACE/ARB THERAPY RXD/TAKEN: CPT | Mod: CPTII,S$GLB,, | Performed by: PODIATRIST

## 2022-11-22 PROCEDURE — 3060F PR POS MICROALBUMINURIA RESULT DOCUMENTED/REVIEW: ICD-10-PCS | Mod: CPTII,S$GLB,, | Performed by: PODIATRIST

## 2022-11-22 PROCEDURE — 3078F DIAST BP <80 MM HG: CPT | Mod: CPTII,S$GLB,, | Performed by: PODIATRIST

## 2022-11-22 PROCEDURE — 82962 GLUCOSE BLOOD TEST: CPT

## 2022-11-22 PROCEDURE — 3051F PR MOST RECENT HEMOGLOBIN A1C LEVEL 7.0 - < 8.0%: ICD-10-PCS | Mod: CPTII,S$GLB,, | Performed by: PODIATRIST

## 2022-11-22 PROCEDURE — 3060F POS MICROALBUMINURIA REV: CPT | Mod: CPTII,S$GLB,, | Performed by: PODIATRIST

## 2022-11-22 PROCEDURE — 3008F BODY MASS INDEX DOCD: CPT | Mod: CPTII,S$GLB,, | Performed by: PODIATRIST

## 2022-11-22 PROCEDURE — 83605 ASSAY OF LACTIC ACID: CPT | Performed by: PHYSICIAN ASSISTANT

## 2022-11-22 PROCEDURE — 85652 RBC SED RATE AUTOMATED: CPT | Performed by: PHYSICIAN ASSISTANT

## 2022-11-22 PROCEDURE — 99285 EMERGENCY DEPT VISIT HI MDM: CPT | Mod: CS,,, | Performed by: PHYSICIAN ASSISTANT

## 2022-11-22 PROCEDURE — 25000003 PHARM REV CODE 250

## 2022-11-22 PROCEDURE — 83605 ASSAY OF LACTIC ACID: CPT | Mod: 91

## 2022-11-22 PROCEDURE — 85025 COMPLETE CBC W/AUTO DIFF WBC: CPT | Performed by: PHYSICIAN ASSISTANT

## 2022-11-22 PROCEDURE — 99285 EMERGENCY DEPT VISIT HI MDM: CPT | Mod: 25

## 2022-11-22 PROCEDURE — A9585 GADOBUTROL INJECTION: HCPCS | Performed by: HOSPITALIST

## 2022-11-22 PROCEDURE — 99999 PR PBB SHADOW E&M-EST. PATIENT-LVL III: CPT | Mod: PBBFAC,,, | Performed by: PODIATRIST

## 2022-11-22 PROCEDURE — 63600175 PHARM REV CODE 636 W HCPCS

## 2022-11-22 PROCEDURE — 3008F PR BODY MASS INDEX (BMI) DOCUMENTED: ICD-10-PCS | Mod: CPTII,S$GLB,, | Performed by: PODIATRIST

## 2022-11-22 PROCEDURE — 25000003 PHARM REV CODE 250: Performed by: PHYSICIAN ASSISTANT

## 2022-11-22 PROCEDURE — 3074F PR MOST RECENT SYSTOLIC BLOOD PRESSURE < 130 MM HG: ICD-10-PCS | Mod: CPTII,S$GLB,, | Performed by: PODIATRIST

## 2022-11-22 PROCEDURE — 87040 BLOOD CULTURE FOR BACTERIA: CPT | Performed by: PHYSICIAN ASSISTANT

## 2022-11-22 PROCEDURE — 99285 PR EMERGENCY DEPT VISIT,LEVEL V: ICD-10-PCS | Mod: CS,,, | Performed by: PHYSICIAN ASSISTANT

## 2022-11-22 PROCEDURE — 3078F PR MOST RECENT DIASTOLIC BLOOD PRESSURE < 80 MM HG: ICD-10-PCS | Mod: CPTII,S$GLB,, | Performed by: PODIATRIST

## 2022-11-22 PROCEDURE — 99024 PR POST-OP FOLLOW-UP VISIT: ICD-10-PCS | Mod: S$GLB,,, | Performed by: PODIATRIST

## 2022-11-22 PROCEDURE — 3066F NEPHROPATHY DOC TX: CPT | Mod: CPTII,S$GLB,, | Performed by: PODIATRIST

## 2022-11-22 PROCEDURE — 94761 N-INVAS EAR/PLS OXIMETRY MLT: CPT

## 2022-11-22 PROCEDURE — 87389 HIV-1 AG W/HIV-1&-2 AB AG IA: CPT | Performed by: PHYSICIAN ASSISTANT

## 2022-11-22 PROCEDURE — 86803 HEPATITIS C AB TEST: CPT | Performed by: PHYSICIAN ASSISTANT

## 2022-11-22 PROCEDURE — 63600175 PHARM REV CODE 636 W HCPCS: Performed by: PHYSICIAN ASSISTANT

## 2022-11-22 PROCEDURE — 99223 PR INITIAL HOSPITAL CARE,LEVL III: ICD-10-PCS | Mod: ,,, | Performed by: PHYSICIAN ASSISTANT

## 2022-11-22 PROCEDURE — 3051F HG A1C>EQUAL 7.0%<8.0%: CPT | Mod: CPTII,S$GLB,, | Performed by: PODIATRIST

## 2022-11-22 PROCEDURE — U0002 COVID-19 LAB TEST NON-CDC: HCPCS | Performed by: PHYSICIAN ASSISTANT

## 2022-11-22 PROCEDURE — 99223 PR INITIAL HOSPITAL CARE,LEVL III: ICD-10-PCS | Mod: ,,, | Performed by: HOSPITALIST

## 2022-11-22 PROCEDURE — 11000001 HC ACUTE MED/SURG PRIVATE ROOM

## 2022-11-22 PROCEDURE — 99223 PR INITIAL HOSPITAL CARE,LEVL III: ICD-10-PCS | Mod: ,,, | Performed by: PODIATRIST

## 2022-11-22 PROCEDURE — 99024 POSTOP FOLLOW-UP VISIT: CPT | Mod: S$GLB,,, | Performed by: PODIATRIST

## 2022-11-22 PROCEDURE — 4010F PR ACE/ARB THEARPY RXD/TAKEN: ICD-10-PCS | Mod: CPTII,S$GLB,, | Performed by: PODIATRIST

## 2022-11-22 PROCEDURE — 99223 1ST HOSP IP/OBS HIGH 75: CPT | Mod: ,,, | Performed by: PODIATRIST

## 2022-11-22 PROCEDURE — 3074F SYST BP LT 130 MM HG: CPT | Mod: CPTII,S$GLB,, | Performed by: PODIATRIST

## 2022-11-22 PROCEDURE — 80053 COMPREHEN METABOLIC PANEL: CPT | Performed by: PHYSICIAN ASSISTANT

## 2022-11-22 PROCEDURE — 3066F PR DOCUMENTATION OF TREATMENT FOR NEPHROPATHY: ICD-10-PCS | Mod: CPTII,S$GLB,, | Performed by: PODIATRIST

## 2022-11-22 PROCEDURE — 99223 1ST HOSP IP/OBS HIGH 75: CPT | Mod: ,,, | Performed by: PHYSICIAN ASSISTANT

## 2022-11-22 PROCEDURE — 99999 PR PBB SHADOW E&M-EST. PATIENT-LVL III: ICD-10-PCS | Mod: PBBFAC,,, | Performed by: PODIATRIST

## 2022-11-22 PROCEDURE — 86140 C-REACTIVE PROTEIN: CPT | Performed by: PHYSICIAN ASSISTANT

## 2022-11-22 PROCEDURE — 25500020 PHARM REV CODE 255: Performed by: HOSPITALIST

## 2022-11-22 RX ORDER — ATORVASTATIN CALCIUM 40 MG/1
80 TABLET, FILM COATED ORAL NIGHTLY
Status: DISCONTINUED | OUTPATIENT
Start: 2022-11-22 | End: 2022-11-28 | Stop reason: HOSPADM

## 2022-11-22 RX ORDER — PROMETHAZINE HYDROCHLORIDE 25 MG/1
25 TABLET ORAL EVERY 6 HOURS PRN
Status: DISCONTINUED | OUTPATIENT
Start: 2022-11-22 | End: 2022-11-28 | Stop reason: HOSPADM

## 2022-11-22 RX ORDER — SODIUM CHLORIDE 0.9 % (FLUSH) 0.9 %
10 SYRINGE (ML) INJECTION EVERY 12 HOURS PRN
Status: DISCONTINUED | OUTPATIENT
Start: 2022-11-22 | End: 2022-11-28 | Stop reason: HOSPADM

## 2022-11-22 RX ORDER — INSULIN ASPART 100 [IU]/ML
3 INJECTION, SOLUTION INTRAVENOUS; SUBCUTANEOUS
Status: DISCONTINUED | OUTPATIENT
Start: 2022-11-22 | End: 2022-11-23

## 2022-11-22 RX ORDER — TALC
6 POWDER (GRAM) TOPICAL NIGHTLY PRN
Status: DISCONTINUED | OUTPATIENT
Start: 2022-11-22 | End: 2022-11-28 | Stop reason: HOSPADM

## 2022-11-22 RX ORDER — NALOXONE HCL 0.4 MG/ML
0.02 VIAL (ML) INJECTION
Status: DISCONTINUED | OUTPATIENT
Start: 2022-11-22 | End: 2022-11-28 | Stop reason: HOSPADM

## 2022-11-22 RX ORDER — CEFEPIME HYDROCHLORIDE 1 G/50ML
2 INJECTION, SOLUTION INTRAVENOUS
Status: COMPLETED | OUTPATIENT
Start: 2022-11-22 | End: 2022-11-22

## 2022-11-22 RX ORDER — IBUPROFEN 200 MG
16 TABLET ORAL
Status: DISCONTINUED | OUTPATIENT
Start: 2022-11-22 | End: 2022-11-28 | Stop reason: HOSPADM

## 2022-11-22 RX ORDER — GADOBUTROL 604.72 MG/ML
10 INJECTION INTRAVENOUS
Status: COMPLETED | OUTPATIENT
Start: 2022-11-22 | End: 2022-11-22

## 2022-11-22 RX ORDER — OXYCODONE HYDROCHLORIDE 10 MG/1
10 TABLET ORAL EVERY 6 HOURS PRN
Status: DISCONTINUED | OUTPATIENT
Start: 2022-11-22 | End: 2022-11-28 | Stop reason: HOSPADM

## 2022-11-22 RX ORDER — HEPARIN SODIUM 5000 [USP'U]/ML
5000 INJECTION, SOLUTION INTRAVENOUS; SUBCUTANEOUS EVERY 8 HOURS
Status: DISCONTINUED | OUTPATIENT
Start: 2022-11-22 | End: 2022-11-28 | Stop reason: HOSPADM

## 2022-11-22 RX ORDER — CARVEDILOL 12.5 MG/1
12.5 TABLET ORAL 2 TIMES DAILY
Status: DISCONTINUED | OUTPATIENT
Start: 2022-11-22 | End: 2022-11-25

## 2022-11-22 RX ORDER — IPRATROPIUM BROMIDE AND ALBUTEROL SULFATE 2.5; .5 MG/3ML; MG/3ML
3 SOLUTION RESPIRATORY (INHALATION) EVERY 4 HOURS PRN
Status: DISCONTINUED | OUTPATIENT
Start: 2022-11-22 | End: 2022-11-28 | Stop reason: HOSPADM

## 2022-11-22 RX ORDER — ASPIRIN 81 MG/1
81 TABLET ORAL DAILY
Status: DISCONTINUED | OUTPATIENT
Start: 2022-11-23 | End: 2022-11-28 | Stop reason: HOSPADM

## 2022-11-22 RX ORDER — GLUCAGON 1 MG
1 KIT INJECTION
Status: DISCONTINUED | OUTPATIENT
Start: 2022-11-22 | End: 2022-11-28 | Stop reason: HOSPADM

## 2022-11-22 RX ORDER — OXYCODONE HYDROCHLORIDE 5 MG/1
5 TABLET ORAL EVERY 6 HOURS PRN
Status: DISCONTINUED | OUTPATIENT
Start: 2022-11-22 | End: 2022-11-28 | Stop reason: HOSPADM

## 2022-11-22 RX ORDER — ACETAMINOPHEN 325 MG/1
650 TABLET ORAL EVERY 4 HOURS PRN
Status: DISCONTINUED | OUTPATIENT
Start: 2022-11-22 | End: 2022-11-28 | Stop reason: HOSPADM

## 2022-11-22 RX ORDER — IBUPROFEN 200 MG
24 TABLET ORAL
Status: DISCONTINUED | OUTPATIENT
Start: 2022-11-22 | End: 2022-11-28 | Stop reason: HOSPADM

## 2022-11-22 RX ORDER — ONDANSETRON 8 MG/1
8 TABLET, ORALLY DISINTEGRATING ORAL EVERY 8 HOURS PRN
Status: DISCONTINUED | OUTPATIENT
Start: 2022-11-22 | End: 2022-11-28 | Stop reason: HOSPADM

## 2022-11-22 RX ORDER — DIPHENHYDRAMINE HCL 25 MG
25 CAPSULE ORAL
Status: COMPLETED | OUTPATIENT
Start: 2022-11-22 | End: 2022-11-22

## 2022-11-22 RX ORDER — ASCORBIC ACID 500 MG
500 TABLET ORAL DAILY
Status: DISCONTINUED | OUTPATIENT
Start: 2022-11-23 | End: 2022-11-28 | Stop reason: HOSPADM

## 2022-11-22 RX ORDER — BISACODYL 10 MG
10 SUPPOSITORY, RECTAL RECTAL DAILY PRN
Status: DISCONTINUED | OUTPATIENT
Start: 2022-11-22 | End: 2022-11-28 | Stop reason: HOSPADM

## 2022-11-22 RX ORDER — LISINOPRIL 20 MG/1
40 TABLET ORAL DAILY
Status: DISCONTINUED | OUTPATIENT
Start: 2022-11-23 | End: 2022-11-28 | Stop reason: HOSPADM

## 2022-11-22 RX ORDER — POLYETHYLENE GLYCOL 3350 17 G/17G
17 POWDER, FOR SOLUTION ORAL DAILY PRN
Status: DISCONTINUED | OUTPATIENT
Start: 2022-11-22 | End: 2022-11-28 | Stop reason: HOSPADM

## 2022-11-22 RX ADMIN — HEPARIN SODIUM 5000 UNITS: 5000 INJECTION INTRAVENOUS; SUBCUTANEOUS at 09:11

## 2022-11-22 RX ADMIN — GADOBUTROL 10 ML: 604.72 INJECTION INTRAVENOUS at 07:11

## 2022-11-22 RX ADMIN — VANCOMYCIN HYDROCHLORIDE 2500 MG: 1 INJECTION, POWDER, LYOPHILIZED, FOR SOLUTION INTRAVENOUS at 02:11

## 2022-11-22 RX ADMIN — CARVEDILOL 12.5 MG: 12.5 TABLET, FILM COATED ORAL at 09:11

## 2022-11-22 RX ADMIN — INSULIN DETEMIR 9 UNITS: 100 INJECTION, SOLUTION SUBCUTANEOUS at 03:11

## 2022-11-22 RX ADMIN — ATORVASTATIN CALCIUM 80 MG: 40 TABLET, FILM COATED ORAL at 09:11

## 2022-11-22 RX ADMIN — CEFEPIME 2 G: 2 INJECTION, POWDER, FOR SOLUTION INTRAVENOUS at 08:11

## 2022-11-22 RX ADMIN — DIPHENHYDRAMINE HYDROCHLORIDE 25 MG: 25 CAPSULE ORAL at 03:11

## 2022-11-22 NOTE — SUBJECTIVE & OBJECTIVE
Past Medical History:   Diagnosis Date    Allergy     CAD (coronary artery disease), native coronary artery 6/25/2013    Cancer     COVID-19 virus detected 9/1/2020    Diabetes mellitus     Diabetes mellitus, type 2     Disorder of kidney and ureter     Heart attack 04/2012    Hx of colon cancer, stage I     Hyperlipidemia     Hypertension     Muscular pain     post-op after colonoscopy    NSTEMI May 2013 - peak troponin 0.22 5/22/2013    MICHAELA (obstructive sleep apnea)     Retinopathy due to secondary diabetes        Past Surgical History:   Procedure Laterality Date    COLONOSCOPY N/A 2/17/2017    Procedure: COLONOSCOPY;  Surgeon: Simon Gomez MD;  Location: [facility] ENDO (4TH FLR);  Service: Endoscopy;  Laterality: N/A;    CORONARY ANGIOPLASTY      INCISION AND DRAINAGE FOOT Right 6/5/2018    Procedure: INCISION AND DRAINAGE, FOOT;  Surgeon: Bridget Santana DPM;  [facility];  Service: Podiatry;  Laterality: Right;  Request mini C arm in room. request wound vac with medium black sponge    INCISION AND DRAINAGE FOOT Right 6/7/2018    Procedure: INCISION AND DRAINAGE, FOOT;  Surgeon: Emelia Brock DPM;  Location: [facility] OR 2ND FLR;  Service: Podiatry;  Laterality: Right;    INCISION AND DRAINAGE FOOT Left 9/4/2020    Procedure: INCISION AND DRAINAGE, FOOT;  Surgeon: Ainsley Powell DPM;  [facility];  Service: Podiatry;  Laterality: Left;    INCISION AND DRAINAGE FOOT Left 12/22/2020    Procedure: INCISION AND DRAINAGE, FOOT;  Surgeon: Ainsley Powell DPM;  Location: [facility] OR [address] [place] [address];  Service: Podiatry;  Laterality: Left;  May need micro choice  Need Jam shidi needles  Stretcher OK      INCISION AND DRAINAGE OF ABSCESS Right 6/2/2018    Procedure: INCISION AND DRAINAGE, ABSCESS;  Surgeon: Bridget Santana DPM;  Location: [facility] OR [address] [place] [address];  Service: General;  Laterality: Right;    OSTEOTOMY OF METATARSAL BONE  9/4/2020    Procedure: OSTEOTOMY, METATARSAL BONE, 1st METATARSAL RESECTION;   Surgeon: Ainsley Powell DPM;  Location: [facility] OR 2ND FLR;  Service: Podiatry;;    REMOVAL OF FOREIGN BODY FROM FOOT Right 6/7/2018    Procedure: REMOVAL, FOREIGN BODY, FOOT;  Surgeon: Emelia Brock DPM;  Location: [facility] OR 2ND FLR;  Service: Podiatry;  Laterality: Right;    TOE AMPUTATION Left 7/4/2020    Procedure: AMPUTATION, TOE;  Surgeon: Bridget Santana DPM;  Location: [facility] OR 2ND FLR;  Service: Podiatry;  Laterality: Left;    TOE AMPUTATION Left 10/14/2020    Procedure: AMPUTATION, TOE;  Surgeon: Mingo Melara DPM;  Location: [facility] OR 2ND FLR;  Service: Podiatry;  Laterality: Left;  stretcher OK    TOE AMPUTATION Right 6/9/2022    Procedure: AMPUTATION, TOE - Dr. Melara @ [place] in ;  Surgeon: Mingo Melara DPM;  Location: [facility] OR 1ST FLR;  Service: Podiatry;  Laterality: Right;    TONSILLECTOMY         Review of patient's allergies indicates:   Allergen Reactions    Penicillins Other (See Comments)     PCN allergy as a child - was told he went into a coma. Tolerates Cefazolin without adverse reactions    Penicillin     Shellfish containing products      Other reaction(s): Unknown    Vancomycin Itching     Tolerated vancomycin 7/2020    Bactrim  [sulfamethoxazole-trimethoprim] Rash       No current facility-administered medications on file prior to encounter.     Current Outpatient Medications on File Prior to Encounter   Medication Sig    acetaminophen (TYLENOL) 325 MG tablet Take 325 mg by mouth every 6 (six) hours as needed for Pain.    ascorbic acid, vitamin C, (VITAMIN C) 250 MG tablet Take 500 mg by mouth once daily.    aspirin (ECOTRIN) 81 MG EC tablet Take 1 tablet (81 mg total) by mouth once daily.    aspirin/salicylamide/caffeine (BC HEADACHE POWDER ORAL) Take 1 Package by mouth daily as needed (pain).    atorvastatin (LIPITOR) 80 MG tablet Take 1 tablet (80 mg total) by mouth once daily. Take every night.    bismuth subsalicylate (PEPTO BISMOL) 262 mg/15 mL suspension Take 15 mLs by mouth  "every other day. (As needed for diarrhea)    blood sugar diagnostic (ACCU-CHEK GUIDE TEST STRIPS) Strp 1 each by Misc.(Non-Drug; Combo Route) route 5 (five) times daily.    carvediloL (COREG) 12.5 MG tablet Take 1 tablet (12.5 mg total) by mouth 2 (two) times daily. (Patient taking differently: Take 12.5 mg by mouth once daily.)    ciprofloxacin HCl (CIPRO) 500 MG tablet Take 1 tablet (500 mg total) by mouth 2 (two) times daily.    dapagliflozin (FARXIGA) 5 mg Tab tablet Take 1 tablet (5 mg total) by mouth once daily.    doxycycline (VIBRA-TABS) 100 MG tablet Take 1 tablet (100 mg total) by mouth 2 (two) times daily.    ibuprofen (ADVIL,MOTRIN) 200 MG tablet Take 400-600 mg by mouth daily as needed for Pain.    insulin (LANTUS SOLOSTAR U-100 INSULIN) glargine 100 units/mL SubQ pen Inject 50 Units into the skin once daily. USE AS BACK UP INSULIN ONLY - EMERGENCY USE IF YOUR ARE OFF OF YOUR INSULIN PUMP    insulin lispro (HUMALOG U-100 INSULIN) 100 unit/mL injection Inject 100 Units into the skin continuous. Gives via insulin pump only. Do not Directly inject.    lancets (ACCU-CHEK FASTCLIX LANCET DRUM) Misc 1 each by Misc.(Non-Drug; Combo Route) route Daily.    lisinopriL (PRINIVIL,ZESTRIL) 40 MG tablet Take 1 tablet by mouth once daily    metFORMIN (GLUCOPHAGE) 1000 MG tablet Take 1 tablet (1,000 mg total) by mouth 2 (two) times daily with meals.    MINIMED 770G INSULIN PUMP Misc 1 each by Misc.(Non-Drug; Combo Route) route continuous. Medically necessary for management of Type 2 diabetes, E11.65    multivit-min/folic/vit K/lycop (MEN'S MULTIVITAMIN ORAL) Take 1 tablet by mouth once daily.    pen needle, diabetic 31 gauge x 3/16" Ndle Inject 1 each into the skin 3 (three) times daily before meals. (Patient taking differently: Inject 1 each into the skin 4 (four) times daily.)    TRULICITY 1.5 mg/0.5 mL pen injector Inject 1.5 mg into the skin every 7 days.     Family History       Problem Relation (Age of Onset)    " Diabetes Mother, Father, Maternal Grandmother, Maternal Grandfather, Paternal Grandmother, Paternal Grandfather    Heart disease Mother, Brother          Tobacco Use    Smoking status: Never    Smokeless tobacco: Never   Substance and Sexual Activity    Alcohol use: Yes     Comment: rare x1 beer-     Drug use: No    Sexual activity: Not Currently     Review of Systems   Constitutional:  Negative for activity change, chills, diaphoresis and fever.   HENT:  Negative for trouble swallowing.    Eyes:  Negative for photophobia and visual disturbance.   Respiratory:  Negative for cough, chest tightness, shortness of breath and wheezing.    Cardiovascular:  Negative for chest pain, palpitations and leg swelling.   Gastrointestinal:  Negative for abdominal pain, constipation, diarrhea, nausea and vomiting.   Genitourinary:  Negative for dysuria, frequency, hematuria and urgency.   Musculoskeletal:  Negative for arthralgias, back pain and gait problem.   Skin:  Positive for color change (right 3rd toe) and wound (right 3rd toe). Negative for rash.   Neurological:  Negative for dizziness, syncope, weakness, light-headedness, numbness and headaches.   Psychiatric/Behavioral:  Negative for agitation and confusion. The patient is not nervous/anxious.    Objective:     Vital Signs (Most Recent):  Temp: 97.9 °F (36.6 °C) (11/22/22 0910)  Pulse: 62 (11/22/22 0910)  Resp: 16 (11/22/22 0910)  BP: 112/65 (11/22/22 0910)  SpO2: 98 % (11/22/22 0910) Vital Signs (24h Range):  Temp:  [97.9 °F (36.6 °C)] 97.9 °F (36.6 °C)  Pulse:  [61-62] 62  Resp:  [16] 16  SpO2:  [98 %] 98 %  BP: (112)/(65-71) 112/65     Weight: 127 kg (280 lb)  Body mass index is 35.95 kg/m².    Physical Exam  Vitals and nursing note reviewed.   Constitutional:       General: He is not in acute distress.     Appearance: He is well-developed. He is obese. He is not toxic-appearing or diaphoretic.   HENT:      Head: Normocephalic and atraumatic.      Mouth/Throat:       Mouth: Mucous membranes are moist.      Pharynx: No oropharyngeal exudate.   Eyes:      Conjunctiva/sclera: Conjunctivae normal.   Cardiovascular:      Rate and Rhythm: Normal rate and regular rhythm.      Heart sounds: Normal heart sounds.   Pulmonary:      Effort: Pulmonary effort is normal. No respiratory distress.      Breath sounds: Wheezing (end expiratory) present.   Abdominal:      General: Bowel sounds are normal. There is no distension.      Palpations: Abdomen is soft.      Tenderness: There is no abdominal tenderness.   Musculoskeletal:      Cervical back: Normal range of motion and neck supple.      Right lower leg: Edema (pitting tibial) present.      Left lower leg: Edema (pitting tibial) present.      Comments: Unable to assess ROM, swelling, tenderness or infection site as patient's toe was wrapped in dressing. Dressing clean, dry and intact.   Lymphadenopathy:      Cervical: No cervical adenopathy.   Skin:     General: Skin is warm and dry.      Capillary Refill: Capillary refill takes less than 2 seconds.      Findings: Erythema (see comment) present. No rash.      Comments: During exam patient's toe was dressed. Dressing clean, dry and intact. Per pictures in patient chart - right 3rd toe with ulcer on lateral aspect, purulent drainage. Additional ulcer on ventral aspect of foot.   Neurological:      Mental Status: He is alert and oriented to person, place, and time.      Cranial Nerves: No cranial nerve deficit.      Sensory: No sensory deficit.      Coordination: Coordination normal.   Psychiatric:         Behavior: Behavior normal.         Thought Content: Thought content normal.         Judgment: Judgment normal.           Significant Labs: All pertinent labs within the past 24 hours have been reviewed.  A1C:   Recent Labs   Lab 06/08/22  0036 08/09/22  1634 11/10/22  0835   HGBA1C 10.0* 8.0* 7.1*     CBC:   Recent Labs   Lab 11/22/22  1154   WBC 10.62   HGB 14.7   HCT 43.2        CMP:    Recent Labs   Lab 11/22/22  1154   *   K 4.4      CO2 21*      BUN 20   CREATININE 1.0   CALCIUM 9.3   PROT 7.7   ALBUMIN 3.4*   BILITOT 0.6   ALKPHOS 97   AST 21   ALT 26   ANIONGAP 13     Significant Imaging: I have reviewed all pertinent imaging results/findings within the past 24 hours.

## 2022-11-22 NOTE — ASSESSMENT & PLAN NOTE
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 X ray unremarkable. MRI pending. Bone culture + for Staph aureus and Pasteurella. Pedal pulses diminished with R CODY 1.1 and L VASILIY 1.2.     Plan:  -Planning for surgical management of osteomyelitis with either right foot (1) 3rd toe/ray amputation or (2) transmetatarsal amputation. Will use MRI for surgical planning. NPO @ midnight in case MRI is completed overnight and there is OR availability, otherwise timing to be determined.   -Antibiotic plan per primary team.   -RLE WB to heel in surgical shoe for transfers or short distances. LLE WBAT.  -Dressing changes by nursing, ordered.  -Podiatry will follow.

## 2022-11-22 NOTE — H&P
[facility] - Emergency Dept  [facility]  History & Physical    Patient Name: Billy Rivera  MRN: [id number]  Patient Class: IP- Inpatient  Admission Date: 11/22/2022  Attending Physician: Katerine Tomlin MD   Primary Care Provider: BRAD Baker MD         Patient information was obtained from patient, past medical records and ER records.     Subjective:     Principal Problem:Diabetic foot infection    Chief Complaint:   Chief Complaint   Patient presents with    Wound Check     States  Wants middle toe on R foot amputated. Told to come to ED. Denies pain.         HPI: Billy Rivera is a 57 y.o. male with a history of type 2 diabetes, CAD, HTN, and colon cancer admitted to [facility] with diabetic foot infection to his right 3rd toe. He was referred from his podiatrist, Dr. Melara, due to a right 3rd toe ulcer with infection that will likely require amputation. Endorses that toe is malodorous and has some purulent drainage. Endorses associated peripheral neuropathy. Denies associated toe pain, ankle or leg pain. He has had two previous amputations (left great toe and right 5th toe) due to similar conditions. Per his podiatrist, he was taking Doxy and Cipro since 11/15/2022 for the current right 3rd toe infection and per patient he has been started on IV abx upon arrival to hospital. He reports that his toe infections began around 3 years ago after stepping on piece of glass barefoot. Denies associated headache, fever, chills, CP, SOB, N/V/D, abdominal pain, urinary symptoms, numbness or tingling.       ED: VSS, AF. WBC 10, lactic acid elevated to 2.3. ESR & CRP mildly elevated, other labs unremarkable. Blood cultures pending. Right foot XR showing DJD without acute fracture or bone destruction. Given 1 L IVF and started on IV Vanc and Cefepime.      Past Medical History:   Diagnosis Date    Allergy     CAD (coronary artery disease), native coronary artery 6/25/2013    Cancer     COVID-19  virus detected 9/1/2020    Diabetes mellitus     Diabetes mellitus, type 2     Disorder of kidney and ureter     Heart attack 04/2012    Hx of colon cancer, stage I     Hyperlipidemia     Hypertension     Muscular pain     post-op after colonoscopy    NSTEMI May 2013 - peak troponin 0.22 5/22/2013    MICHAELA (obstructive sleep apnea)     Retinopathy due to secondary diabetes        Past Surgical History:   Procedure Laterality Date    COLONOSCOPY N/A 2/17/2017    Procedure: COLONOSCOPY;  Surgeon: Simon Gomez MD;  Location: [facility] ENDO (4TH FLR);  Service: Endoscopy;  Laterality: N/A;    CORONARY ANGIOPLASTY      INCISION AND DRAINAGE FOOT Right 6/5/2018    Procedure: INCISION AND DRAINAGE, FOOT;  Surgeon: Bridget Santana DPM;  Location: [facility] OR 1ST FLR;  Service: Podiatry;  Laterality: Right;  Request mini C arm in room. request wound vac with medium black sponge    INCISION AND DRAINAGE FOOT Right 6/7/2018    Procedure: INCISION AND DRAINAGE, FOOT;  Surgeon: Emelia Brock DPM;  Location: [facility] OR 2ND FLR;  Service: Podiatry;  Laterality: Right;    INCISION AND DRAINAGE FOOT Left 9/4/2020    Procedure: INCISION AND DRAINAGE, FOOT;  Surgeon: Ainsley Powell DPM;  Location: [facility] OR 2ND FLR;  Service: Podiatry;  Laterality: Left;    INCISION AND DRAINAGE FOOT Left 12/22/2020    Procedure: INCISION AND DRAINAGE, FOOT;  Surgeon: Ainsley Powell DPM;  Location: [facility] OR 2ND FLR;  Service: Podiatry;  Laterality: Left;  May need micro choice  Need Jam shidi needles  Stretcher OK      INCISION AND DRAINAGE OF ABSCESS Right 6/2/2018    Procedure: INCISION AND DRAINAGE, ABSCESS;  Surgeon: Bridget Santana DPM;  Location: [facility] OR 2ND FLR;  Service: General;  Laterality: Right;    OSTEOTOMY OF METATARSAL BONE  9/4/2020    Procedure: OSTEOTOMY, METATARSAL BONE, 1st METATARSAL RESECTION;  Surgeon: Ainsley Powell DPM;  [facility];  Service: Podiatry;;    REMOVAL OF FOREIGN BODY FROM FOOT Right  6/7/2018    Procedure: REMOVAL, FOREIGN BODY, FOOT;  Surgeon: Emelia Brock DPM;  Location: [facility] OR 2ND FLR;  Service: Podiatry;  Laterality: Right;    TOE AMPUTATION Left 7/4/2020    Procedure: AMPUTATION, TOE;  Surgeon: Bridget Santana DPM;  Location: [facility] OR 2ND FLR;  Service: Podiatry;  Laterality: Left;    TOE AMPUTATION Left 10/14/2020    Procedure: AMPUTATION, TOE;  Surgeon: Mingo Melara DPM;  Location: [facility] OR 2ND FLR;  Service: Podiatry;  Laterality: Left;  stretcher OK    TOE AMPUTATION Right 6/9/2022    Procedure: AMPUTATION, TOE - Dr. Melara @ [facility] in am;  Surgeon: Mingo Melara DPM;  Location: [facility] OR 1ST FLR;  Service: Podiatry;  Laterality: Right;    TONSILLECTOMY         Review of patient's allergies indicates:   Allergen Reactions    Penicillins Other (See Comments)     PCN allergy as a child - was told he went into a coma. Tolerates Cefazolin without adverse reactions    Penicillin     Shellfish containing products      Other reaction(s): Unknown    Vancomycin Itching     Tolerated vancomycin 7/2020    Bactrim  [sulfamethoxazole-trimethoprim] Rash       No current facility-administered medications on file prior to encounter.     Current Outpatient Medications on File Prior to Encounter   Medication Sig    acetaminophen (TYLENOL) 325 MG tablet Take 325 mg by mouth every 6 (six) hours as needed for Pain.    ascorbic acid, vitamin C, (VITAMIN C) 250 MG tablet Take 500 mg by mouth once daily.    aspirin (ECOTRIN) 81 MG EC tablet Take 1 tablet (81 mg total) by mouth once daily.    aspirin/salicylamide/caffeine (BC HEADACHE POWDER ORAL) Take 1 Package by mouth daily as needed (pain).    atorvastatin (LIPITOR) 80 MG tablet Take 1 tablet (80 mg total) by mouth once daily. Take every night.    bismuth subsalicylate (PEPTO BISMOL) 262 mg/15 mL suspension Take 15 mLs by mouth every other day. (As needed for diarrhea)    blood sugar diagnostic (ACCU-CHEK GUIDE TEST STRIPS) Strp 1  "each by Misc.(Non-Drug; Combo Route) route 5 (five) times daily.    carvediloL (COREG) 12.5 MG tablet Take 1 tablet (12.5 mg total) by mouth 2 (two) times daily. (Patient taking differently: Take 12.5 mg by mouth once daily.)    ciprofloxacin HCl (CIPRO) 500 MG tablet Take 1 tablet (500 mg total) by mouth 2 (two) times daily.    dapagliflozin (FARXIGA) 5 mg Tab tablet Take 1 tablet (5 mg total) by mouth once daily.    doxycycline (VIBRA-TABS) 100 MG tablet Take 1 tablet (100 mg total) by mouth 2 (two) times daily.    ibuprofen (ADVIL,MOTRIN) 200 MG tablet Take 400-600 mg by mouth daily as needed for Pain.    insulin (LANTUS SOLOSTAR U-100 INSULIN) glargine 100 units/mL SubQ pen Inject 50 Units into the skin once daily. USE AS BACK UP INSULIN ONLY - EMERGENCY USE IF YOUR ARE OFF OF YOUR INSULIN PUMP    insulin lispro (HUMALOG U-100 INSULIN) 100 unit/mL injection Inject 100 Units into the skin continuous. Gives via insulin pump only. Do not Directly inject.    lancets (ACCU-CHEK FASTCLIX LANCET DRUM) Misc 1 each by Misc.(Non-Drug; Combo Route) route Daily.    lisinopriL (PRINIVIL,ZESTRIL) 40 MG tablet Take 1 tablet by mouth once daily    metFORMIN (GLUCOPHAGE) 1000 MG tablet Take 1 tablet (1,000 mg total) by mouth 2 (two) times daily with meals.    MINIMED 770G INSULIN PUMP Misc 1 each by Misc.(Non-Drug; Combo Route) route continuous. Medically necessary for management of Type 2 diabetes, E11.65    multivit-min/folic/vit K/lycop (MEN'S MULTIVITAMIN ORAL) Take 1 tablet by mouth once daily.    pen needle, diabetic 31 gauge x 3/16" Ndle Inject 1 each into the skin 3 (three) times daily before meals. (Patient taking differently: Inject 1 each into the skin 4 (four) times daily.)    TRULICITY 1.5 mg/0.5 mL pen injector Inject 1.5 mg into the skin every 7 days.     Family History       Problem Relation (Age of Onset)    Diabetes Mother, Father, Maternal Grandmother, Maternal Grandfather, Paternal Grandmother, " Paternal Grandfather    Heart disease Mother, Brother          Tobacco Use    Smoking status: Never    Smokeless tobacco: Never   Substance and Sexual Activity    Alcohol use: Yes     Comment: rare x1 beer-     Drug use: No    Sexual activity: Not Currently     Review of Systems   Constitutional:  Negative for activity change, chills, diaphoresis and fever.   HENT:  Negative for trouble swallowing.    Eyes:  Negative for photophobia and visual disturbance.   Respiratory:  Negative for cough, chest tightness, shortness of breath and wheezing.    Cardiovascular:  Negative for chest pain, palpitations and leg swelling.   Gastrointestinal:  Negative for abdominal pain, constipation, diarrhea, nausea and vomiting.   Genitourinary:  Negative for dysuria, frequency, hematuria and urgency.   Musculoskeletal:  Negative for arthralgias, back pain and gait problem.   Skin:  Positive for color change (right 3rd toe) and wound (right 3rd toe). Negative for rash.   Neurological:  Negative for dizziness, syncope, weakness, light-headedness, numbness and headaches.   Psychiatric/Behavioral:  Negative for agitation and confusion. The patient is not nervous/anxious.    Objective:     Vital Signs (Most Recent):  Temp: 97.9 °F (36.6 °C) (11/22/22 0910)  Pulse: 62 (11/22/22 0910)  Resp: 16 (11/22/22 0910)  BP: 112/65 (11/22/22 0910)  SpO2: 98 % (11/22/22 0910) Vital Signs (24h Range):  Temp:  [97.9 °F (36.6 °C)] 97.9 °F (36.6 °C)  Pulse:  [61-62] 62  Resp:  [16] 16  SpO2:  [98 %] 98 %  BP: (112)/(65-71) 112/65     Weight: 127 kg (280 lb)  Body mass index is 35.95 kg/m².    Physical Exam  Vitals and nursing note reviewed.   Constitutional:       General: He is not in acute distress.     Appearance: He is well-developed. He is obese. He is not toxic-appearing or diaphoretic.   HENT:      Head: Normocephalic and atraumatic.      Mouth/Throat:      Mouth: Mucous membranes are moist.      Pharynx: No oropharyngeal exudate.   Eyes:       Conjunctiva/sclera: Conjunctivae normal.   Cardiovascular:      Rate and Rhythm: Normal rate and regular rhythm.      Heart sounds: Normal heart sounds.   Pulmonary:      Effort: Pulmonary effort is normal. No respiratory distress.      Breath sounds: Wheezing (end expiratory) present.   Abdominal:      General: Bowel sounds are normal. There is no distension.      Palpations: Abdomen is soft.      Tenderness: There is no abdominal tenderness.   Musculoskeletal:      Cervical back: Normal range of motion and neck supple.      Right lower leg: Edema (pitting tibial) present.      Left lower leg: Edema (pitting tibial) present.      Comments: Unable to assess ROM, swelling, tenderness or infection site as patient's toe was wrapped in dressing. Dressing clean, dry and intact.   Lymphadenopathy:      Cervical: No cervical adenopathy.   Skin:     General: Skin is warm and dry.      Capillary Refill: Capillary refill takes less than 2 seconds.      Findings: Erythema (see comment) present. No rash.      Comments: During exam patient's toe was dressed. Dressing clean, dry and intact. Per pictures in patient chart - right 3rd toe with ulcer on lateral aspect, purulent drainage. Additional ulcer on ventral aspect of foot.   Neurological:      Mental Status: He is alert and oriented to person, place, and time.      Cranial Nerves: No cranial nerve deficit.      Sensory: No sensory deficit.      Coordination: Coordination normal.   Psychiatric:         Behavior: Behavior normal.         Thought Content: Thought content normal.         Judgment: Judgment normal.           Significant Labs: All pertinent labs within the past 24 hours have been reviewed.  A1C:   Recent Labs   Lab 06/08/22  0036 08/09/22  1634 11/10/22  0835   HGBA1C 10.0* 8.0* 7.1*     CBC:   Recent Labs   Lab 11/22/22  1154   WBC 10.62   HGB 14.7   HCT 43.2        CMP:   Recent Labs   Lab 11/22/22  1154   *   K 4.4      CO2 21*      BUN  20   CREATININE 1.0   CALCIUM 9.3   PROT 7.7   ALBUMIN 3.4*   BILITOT 0.6   ALKPHOS 97   AST 21   ALT 26   ANIONGAP 13     Significant Imaging: I have reviewed all pertinent imaging results/findings within the past 24 hours.    Assessment/Plan:     * Diabetic foot infection  Right foot pain    - pt with 2 previous toe amputations for similar infections - left great toe and right 5th toe - most recent 06/2022  - previous cultures 06/2022 significant for strep agalactiae (GBS) and prevotella bivia  - cultures by podiatry clinic 11/15/2022 significant for staph aureus and pasturella multocida  - ESR & CRP elevated  - WBC without leukocytosis  - current blood cultures pending, will follow  - will repeat lactic and follow results  - podiatry consulted, will follow recs   - hold doxy and cipro  - continue IV Vanc and Cefepime   - keep foot elevated and dry  - pt currently pain free, pain medication prn if needed   - hold home diabetic medications    Diabetic foot ulcer associated with type 2 diabetes mellitus  Patient's FSGs are controlled on current medication regimen.  Last A1c reviewed-   Lab Results   Component Value Date    HGBA1C 7.1 (H) 11/10/2022     Most recent fingerstick glucose reviewed- No results for input(s): POCTGLUCOSE in the last 24 hours.  Current correctional scale  Low  Maintain anti-hyperglycemic dose as follows-     Hold Oral hypoglycemics while patient is in the hospital.  Antihyperglycemics (From admission, onward)    Start     Stop Route Frequency Ordered    11/22/22 1645  insulin aspart U-100 pen 3 Units         -- SubQ 3 times daily with meals 11/22/22 1422    11/22/22 1530  insulin detemir U-100 pen 9 Units         -- SubQ Daily 11/22/22 1422        Hold Oral hypoglycemics while patient is in the hospital.    CAD (coronary artery disease)  - continue HTN management   - ekg prn for chest pain    Hypertension associated with diabetes  - continue coreg 12.5 mg BID  - continue lisinopril 40 mg  daily    VTE Risk Mitigation (From admission, onward)         Ordered     heparin (porcine) injection 5,000 Units  Every 8 hours         11/22/22 1422     IP VTE HIGH RISK PATIENT  Once         11/22/22 1422     Place sequential compression device  Until discontinued         11/22/22 1422                   Zack Gastelum PA-C  Department of Hospital Medicine   [facility] - Emergency Dept

## 2022-11-22 NOTE — CONSULTS
[facility] - Emergency Dept  Podiatry  Consult Note    Patient Name: Billy Sheffield Miguel  MRN: [id number]  Admission Date: 11/22/2022  Hospital Length of Stay: 0 days  Attending Physician: Katerine Tomlin MD  Primary Care Provider: BRAD Baker MD     Inpatient consult to Podiatry  Consult performed by: Josiah Saunders MD  Consult ordered by: DARVIN Whitmore        Subjective:     History of Present Illness:  58 yo M with hx of DM, CAD, colon cancer, L 1st ray amputation, R 5th ray amputation admitted for R 3rd toe infection. Followed outpatient by [facility], seen in Dr Melara's clinic today and sent to the ED for further workup and R 3rd toe amputation at a minimum. Patient denies pain in the R foot but admits to significant peripheral neuropathy. Denies issues with the L foot at this time. Denies fever, chills, nausea, vomiting.       Scheduled Meds:   [START ON 11/23/2022] ascorbic acid (vitamin C)  500 mg Oral Daily    [START ON 11/23/2022] aspirin  81 mg Oral Daily    atorvastatin  80 mg Oral QHS    carvediloL  12.5 mg Oral BID    ceFEPime (MAXIPIME) IVPB  2 g Intravenous ED 1 Time    heparin (porcine)  5,000 Units Subcutaneous Q8H    insulin aspart U-100  3 Units Subcutaneous TIDWM    insulin detemir U-100  9 Units Subcutaneous Daily    [START ON 11/23/2022] lisinopriL  40 mg Oral Daily    vancomycin (VANCOCIN) IVPB  2,500 mg Intravenous ED 1 Time     Continuous Infusions:  PRN Meds:acetaminophen, albuterol-ipratropium, bisacodyL, dextrose 10%, dextrose 10%, glucagon (human recombinant), glucose, glucose, melatonin, naloxone, ondansetron, oxyCODONE, oxyCODONE, polyethylene glycol, promethazine, sodium chloride 0.9%    Review of patient's allergies indicates:   Allergen Reactions    Penicillins Other (See Comments)     PCN allergy as a child - was told he went into a coma. Tolerates Cefazolin without adverse reactions    Penicillin     Shellfish containing products      Other  reaction(s): Unknown    Vancomycin Itching     Tolerated vancomycin 7/2020    Bactrim  [sulfamethoxazole-trimethoprim] Rash        Past Medical History:   Diagnosis Date    Allergy     CAD (coronary artery disease), native coronary artery 6/25/2013    Cancer     COVID-19 virus detected 9/1/2020    Diabetes mellitus     Diabetes mellitus, type 2     Disorder of kidney and ureter     Heart attack 04/2012    Hx of colon cancer, stage I     Hyperlipidemia     Hypertension     Muscular pain     post-op after colonoscopy    NSTEMI May 2013 - peak troponin 0.22 5/22/2013    MICHAELA (obstructive sleep apnea)     Retinopathy due to secondary diabetes      Past Surgical History:   Procedure Laterality Date    COLONOSCOPY N/A 2/17/2017    Procedure: COLONOSCOPY;  Surgeon: Simon Gomez MD;  Location: [facility] ENDO (4TH FLR);  Service: Endoscopy;  Laterality: N/A;    CORONARY ANGIOPLASTY      INCISION AND DRAINAGE FOOT Right 6/5/2018    Procedure: INCISION AND DRAINAGE, FOOT;  Surgeon: Bridget Santana DPM;  [facility];  Service: Podiatry;  Laterality: Right;  Request mini C arm in room. request wound vac with medium black sponge    INCISION AND DRAINAGE FOOT Right 6/7/2018    Procedure: INCISION AND DRAINAGE, FOOT;  Surgeon: Emelia Brock DPM;  Location: [facility] OR 2ND FLR;  Service: Podiatry;  Laterality: Right;    INCISION AND DRAINAGE FOOT Left 9/4/2020    Procedure: INCISION AND DRAINAGE, FOOT;  Surgeon: Ainsley Powell DPM;  Location: [facility] OR 2ND FLR;  Service: Podiatry;  Laterality: Left;    INCISION AND DRAINAGE FOOT Left 12/22/2020    Procedure: INCISION AND DRAINAGE, FOOT;  Surgeon: Ainsley Powell DPM;  [facility];  Service: Podiatry;  Laterality: Left;  May need micro choice  Need Jam shidi needles  Stretcher OK      INCISION AND DRAINAGE OF ABSCESS Right 6/2/2018    Procedure: INCISION AND DRAINAGE, ABSCESS;  Surgeon: Bridget Santana DPM;  [facility];   Service: General;  Laterality: Right;    OSTEOTOMY OF METATARSAL BONE  9/4/2020    Procedure: OSTEOTOMY, METATARSAL BONE, 1st METATARSAL RESECTION;  Surgeon: Ainsley Powell DPM;  Location: [facility] OR 2ND FLR;  Service: Podiatry;;    REMOVAL OF FOREIGN BODY FROM FOOT Right 6/7/2018    Procedure: REMOVAL, FOREIGN BODY, FOOT;  Surgeon: Emelia Brock DPM;  Location: [facility] OR 2ND FLR;  Service: Podiatry;  Laterality: Right;    TOE AMPUTATION Left 7/4/2020    Procedure: AMPUTATION, TOE;  Surgeon: Bridget Santana DPM;  Location: [facility] OR 2ND FLR;  Service: Podiatry;  Laterality: Left;    TOE AMPUTATION Left 10/14/2020    Procedure: AMPUTATION, TOE;  Surgeon: Mingo Melara DPM;  Location: [facility] OR 2ND FLR;  Service: Podiatry;  Laterality: Left;  stretcher OK    TOE AMPUTATION Right 6/9/2022    Procedure: AMPUTATION, TOE - Dr. Melara @ [place] in ;  Surgeon: Mingo Melara DPM;  Location: [facility] OR 1ST FLR;  Service: Podiatry;  Laterality: Right;    TONSILLECTOMY         Family History       Problem Relation (Age of Onset)    Diabetes Mother, Father, Maternal Grandmother, Maternal Grandfather, Paternal Grandmother, Paternal Grandfather    Heart disease Mother, Brother          Tobacco Use    Smoking status: Never    Smokeless tobacco: Never   Substance and Sexual Activity    Alcohol use: Yes     Comment: rare x1 beer-     Drug use: No    Sexual activity: Not Currently     Review of Systems   Constitutional:  Negative for activity change, chills, diaphoresis and fever.   HENT:  Negative for trouble swallowing.    Eyes:  Negative for photophobia and visual disturbance.   Respiratory:  Negative for cough, chest tightness, shortness of breath and wheezing.    Cardiovascular:  Negative for chest pain, palpitations and leg swelling.   Gastrointestinal:  Negative for abdominal pain, constipation, diarrhea, nausea and vomiting.   Genitourinary:  Negative for dysuria, frequency, hematuria and urgency.    Musculoskeletal:  Positive for gait problem. Negative for arthralgias and back pain.   Skin:  Positive for color change and wound. Negative for rash.   Neurological:  Positive for numbness. Negative for dizziness, syncope, weakness, light-headedness and headaches.   Psychiatric/Behavioral:  Negative for agitation and confusion. The patient is not nervous/anxious.    Objective:     Vital Signs (Most Recent):  Temp: 97.9 °F (36.6 °C) (11/22/22 0910)  Pulse: 62 (11/22/22 0910)  Resp: 16 (11/22/22 0910)  BP: 112/65 (11/22/22 0910)  SpO2: 98 % (11/22/22 0910) Vital Signs (24h Range):  Temp:  [97.9 °F (36.6 °C)] 97.9 °F (36.6 °C)  Pulse:  [61-62] 62  Resp:  [16] 16  SpO2:  [98 %] 98 %  BP: (112)/(65-71) 112/65     Weight: 127 kg (280 lb)  Body mass index is 35.95 kg/m².    Foot Exam    General  Orientation: alert and oriented to person, place, and time       Right Foot/Ankle     Inspection and Palpation  Tenderness: none   Swelling: (3rd toe)  Skin Exam: callus, drainage, dry skin, cellulitis, skin changes, abnormal color, ulcer and erythema; skin not intact     Neurovascular  Dorsalis pedis: 1+  Posterior tibial: doppler  Saphenous nerve sensation: absent  Tibial nerve sensation: absent  Superficial peroneal nerve sensation: absent  Deep peroneal nerve sensation: absent  Sural nerve sensation: absent    Comments  -Partial 5th ray amputated  -3rd toe with lateral wound down to bone, fibronecrotic wound bed, serous drainage, mild malodor. Not tender. Chronic periwound skin thickening.   -2nd metatarsal head plantar wound down to bone, fibrotic wound bed, serosanguinous drainage. Not tender.     Left Foot/Ankle      Inspection and Palpation  Tenderness: none   Swelling: none   Skin Exam: callus and dry skin; no drainage, no cellulitis, no ulcer and no erythema     Neurovascular  Dorsalis pedis: 1+  Posterior tibial: doppler  Saphenous nerve sensation: absent  Tibial nerve sensation: absent  Superficial peroneal nerve  sensation: absent  Deep peroneal nerve sensation: absent  Sural nerve sensation: absent    Comments  -Partial 1st ray amputated                            Laboratory:  Blood Cultures: No results for input(s): LABBLOO in the last 48 hours.  CBC:   Recent Labs   Lab 11/22/22  1154   WBC 10.62   RBC 4.93   HGB 14.7   HCT 43.2      MCV 88   MCH 29.8   MCHC 34.0     CMP:   Recent Labs   Lab 11/22/22  1154      CALCIUM 9.3   ALBUMIN 3.4*   PROT 7.7   *   K 4.4   CO2 21*      BUN 20   CREATININE 1.0   ALKPHOS 97   ALT 26   AST 21   BILITOT 0.6     CRP:   Recent Labs   Lab 11/22/22  1154   CRP 15.6*     ESR:   Recent Labs   Lab 11/22/22  1154   SEDRATE 56*     Microbiology Results (last 7 days)       Procedure Component Value Units Date/Time    Blood culture #1 **CANNOT BE ORDERED STAT** [[id number]] Collected: 11/22/22 1153    Order Status: Sent Specimen: Blood from Peripheral, Forearm, Right Updated: 11/22/22 1205    Blood culture #2 **CANNOT BE ORDERED STAT** [[id number]] Collected: 11/22/22 1153    Order Status: Sent Specimen: Blood from Peripheral, Forearm, Right Updated: 11/22/22 1205          Specimen (24h ago, onward)      None          Assessment/Plan:     * Diabetic foot infection  Assessment: IDSA moderate diabetic foot infection with right 3rd toe osteomyelitis and possible 2nd MTPJ osteomyelitis. X ray unremarkable. MRI pending. Bone culture + for Staph aureus and Pasteurella. Pedal pulses diminished with R CODY 1.1 and L VASILIY 1.2.     Plan:  -Planning for surgical management of osteomyelitis with either right foot (1) 3rd toe/ray amputation or (2) transmetatarsal amputation. Will use MRI for surgical planning. NPO @ midnight in case MRI is completed overnight and there is OR availability, otherwise timing to be determined.   -Antibiotic plan per primary team.   -RLE WB to heel in surgical shoe for transfers or short distances. LLE WBAT.  -Dressing changes by nursing, ordered.  -Podiatry  will follow.         Thank you for your consult. I will follow-up with patient. Please contact us if you have any additional questions.        Josiah Saunders DPM PGY-3  Podiatric Medicine & Surgery  [facility]  Secure Chat Preferred  Mobile: [phone]  Pager: [phone]

## 2022-11-22 NOTE — SUBJECTIVE & OBJECTIVE
Scheduled Meds:   [START ON 11/23/2022] ascorbic acid (vitamin C)  500 mg Oral Daily    [START ON 11/23/2022] aspirin  81 mg Oral Daily    atorvastatin  80 mg Oral QHS    carvediloL  12.5 mg Oral BID    ceFEPime (MAXIPIME) IVPB  2 g Intravenous ED 1 Time    heparin (porcine)  5,000 Units Subcutaneous Q8H    insulin aspart U-100  3 Units Subcutaneous TIDWM    insulin detemir U-100  9 Units Subcutaneous Daily    [START ON 11/23/2022] lisinopriL  40 mg Oral Daily    vancomycin (VANCOCIN) IVPB  2,500 mg Intravenous ED 1 Time     Continuous Infusions:  PRN Meds:acetaminophen, albuterol-ipratropium, bisacodyL, dextrose 10%, dextrose 10%, glucagon (human recombinant), glucose, glucose, melatonin, naloxone, ondansetron, oxyCODONE, oxyCODONE, polyethylene glycol, promethazine, sodium chloride 0.9%    Review of patient's allergies indicates:   Allergen Reactions    Penicillins Other (See Comments)     PCN allergy as a child - was told he went into a coma. Tolerates Cefazolin without adverse reactions    Penicillin     Shellfish containing products      Other reaction(s): Unknown    Vancomycin Itching     Tolerated vancomycin 7/2020    Bactrim  [sulfamethoxazole-trimethoprim] Rash        Past Medical History:   Diagnosis Date    Allergy     CAD (coronary artery disease), native coronary artery 6/25/2013    Cancer     COVID-19 virus detected 9/1/2020    Diabetes mellitus     Diabetes mellitus, type 2     Disorder of kidney and ureter     Heart attack 04/2012    Hx of colon cancer, stage I     Hyperlipidemia     Hypertension     Muscular pain     post-op after colonoscopy    NSTEMI May 2013 - peak troponin 0.22 5/22/2013    MICHAELA (obstructive sleep apnea)     Retinopathy due to secondary diabetes      Past Surgical History:   Procedure Laterality Date    COLONOSCOPY N/A 2/17/2017    Procedure: COLONOSCOPY;  Surgeon: Simon Gomez MD;  Location: [facility] ([address] [place] [address]);  Service: Endoscopy;  Laterality: N/A;    CORONARY ANGIOPLASTY       INCISION AND DRAINAGE FOOT Right 6/5/2018    Procedure: INCISION AND DRAINAGE, FOOT;  Surgeon: Bridget Santana DPM;  Location: [facility] OR 1ST FLR;  Service: Podiatry;  Laterality: Right;  Request mini C arm in room. request wound vac with medium black sponge    INCISION AND DRAINAGE FOOT Right 6/7/2018    Procedure: INCISION AND DRAINAGE, FOOT;  Surgeon: Emelia Brock DPM;  Location: [facility] OR 2ND FLR;  Service: Podiatry;  Laterality: Right;    INCISION AND DRAINAGE FOOT Left 9/4/2020    Procedure: INCISION AND DRAINAGE, FOOT;  Surgeon: Ainsley Powell DPM;  Location: [facility] OR 2ND FLR;  Service: Podiatry;  Laterality: Left;    INCISION AND DRAINAGE FOOT Left 12/22/2020    Procedure: INCISION AND DRAINAGE, FOOT;  Surgeon: Ainsley Powell DPM;  Location: [facility] OR 2ND FLR;  Service: Podiatry;  Laterality: Left;  May need micro choice  Need Jam shidi needles  Stretcher OK      INCISION AND DRAINAGE OF ABSCESS Right 6/2/2018    Procedure: INCISION AND DRAINAGE, ABSCESS;  Surgeon: Bridget Santana DPM;  Location: [facility] OR 2ND FLR;  Service: General;  Laterality: Right;    OSTEOTOMY OF METATARSAL BONE  9/4/2020    Procedure: OSTEOTOMY, METATARSAL BONE, 1st METATARSAL RESECTION;  Surgeon: Ainsley Powell DPM;  Location: [facility] OR 2ND FLR;  Service: Podiatry;;    REMOVAL OF FOREIGN BODY FROM FOOT Right 6/7/2018    Procedure: REMOVAL, FOREIGN BODY, FOOT;  Surgeon: Emelia Brock DPM;  Location: [facility] OR 2ND FLR;  Service: Podiatry;  Laterality: Right;    TOE AMPUTATION Left 7/4/2020    Procedure: AMPUTATION, TOE;  Surgeon: Bridget Santana DPM;  Location: [facility] OR 2ND FLR;  Service: Podiatry;  Laterality: Left;    TOE AMPUTATION Left 10/14/2020    Procedure: AMPUTATION, TOE;  Surgeon: Mingo Melara DPM;  Location: [facility] OR 2ND FLR;  Service: Podiatry;  Laterality: Left;  stretcher OK    TOE AMPUTATION Right 6/9/2022    Procedure: AMPUTATION, TOE - Dr. Melara @ [place] in am;  Surgeon: Mingo Melara DPM;  Location: [facility] OR  1ST FLR;  Service: Podiatry;  Laterality: Right;    TONSILLECTOMY         Family History       Problem Relation (Age of Onset)    Diabetes Mother, Father, Maternal Grandmother, Maternal Grandfather, Paternal Grandmother, Paternal Grandfather    Heart disease Mother, Brother          Tobacco Use    Smoking status: Never    Smokeless tobacco: Never   Substance and Sexual Activity    Alcohol use: Yes     Comment: rare x1 beer-     Drug use: No    Sexual activity: Not Currently     Review of Systems   Constitutional:  Negative for activity change, chills, diaphoresis and fever.   HENT:  Negative for trouble swallowing.    Eyes:  Negative for photophobia and visual disturbance.   Respiratory:  Negative for cough, chest tightness, shortness of breath and wheezing.    Cardiovascular:  Negative for chest pain, palpitations and leg swelling.   Gastrointestinal:  Negative for abdominal pain, constipation, diarrhea, nausea and vomiting.   Genitourinary:  Negative for dysuria, frequency, hematuria and urgency.   Musculoskeletal:  Positive for gait problem. Negative for arthralgias and back pain.   Skin:  Positive for color change and wound. Negative for rash.   Neurological:  Positive for numbness. Negative for dizziness, syncope, weakness, light-headedness and headaches.   Psychiatric/Behavioral:  Negative for agitation and confusion. The patient is not nervous/anxious.    Objective:     Vital Signs (Most Recent):  Temp: 97.9 °F (36.6 °C) (11/22/22 0910)  Pulse: 62 (11/22/22 0910)  Resp: 16 (11/22/22 0910)  BP: 112/65 (11/22/22 0910)  SpO2: 98 % (11/22/22 0910) Vital Signs (24h Range):  Temp:  [97.9 °F (36.6 °C)] 97.9 °F (36.6 °C)  Pulse:  [61-62] 62  Resp:  [16] 16  SpO2:  [98 %] 98 %  BP: (112)/(65-71) 112/65     Weight: 127 kg (280 lb)  Body mass index is 35.95 kg/m².    Foot Exam    General  Orientation: alert and oriented to person, place, and time       Right Foot/Ankle     Inspection and Palpation  Tenderness: none    Swelling: (3rd toe)  Skin Exam: callus, drainage, dry skin, cellulitis, skin changes, abnormal color, ulcer and erythema; skin not intact     Neurovascular  Dorsalis pedis: 1+  Posterior tibial: doppler  Saphenous nerve sensation: absent  Tibial nerve sensation: absent  Superficial peroneal nerve sensation: absent  Deep peroneal nerve sensation: absent  Sural nerve sensation: absent    Comments  -Partial 5th ray amputated  -3rd toe with lateral wound down to bone, fibronecrotic wound bed, serous drainage, mild malodor. Not tender. Chronic periwound skin thickening.   -2nd metatarsal head plantar wound down to bone, fibrotic wound bed, serosanguinous drainage. Not tender.     Left Foot/Ankle      Inspection and Palpation  Tenderness: none   Swelling: none   Skin Exam: callus and dry skin; no drainage, no cellulitis, no ulcer and no erythema     Neurovascular  Dorsalis pedis: 1+  Posterior tibial: doppler  Saphenous nerve sensation: absent  Tibial nerve sensation: absent  Superficial peroneal nerve sensation: absent  Deep peroneal nerve sensation: absent  Sural nerve sensation: absent    Comments  -Partial 1st ray amputated                            Laboratory:  Blood Cultures: No results for input(s): LABBLOO in the last 48 hours.  CBC:   Recent Labs   Lab 11/22/22  1154   WBC 10.62   RBC 4.93   HGB 14.7   HCT 43.2      MCV 88   MCH 29.8   MCHC 34.0     CMP:   Recent Labs   Lab 11/22/22  1154      CALCIUM 9.3   ALBUMIN 3.4*   PROT 7.7   *   K 4.4   CO2 21*      BUN 20   CREATININE 1.0   ALKPHOS 97   ALT 26   AST 21   BILITOT 0.6     CRP:   Recent Labs   Lab 11/22/22  1154   CRP 15.6*     ESR:   Recent Labs   Lab 11/22/22  1154   SEDRATE 56*     Microbiology Results (last 7 days)       Procedure Component Value Units Date/Time    Blood culture #1 **CANNOT BE ORDERED STAT** [[id number]] Collected: 11/22/22 1153    Order Status: Sent Specimen: Blood from Peripheral, Forearm, Right Updated:  11/22/22 1205    Blood culture #2 **CANNOT BE ORDERED STAT** [[id number]] Collected: 11/22/22 1153    Order Status: Sent Specimen: Blood from Peripheral, Forearm, Right Updated: 11/22/22 1205          Specimen (24h ago, onward)      None

## 2022-11-22 NOTE — HPI
Billy Rivera is a 57 y.o. male with a history of type 2 diabetes, CAD, HTN, and colon cancer admitted to [facility] with diabetic foot infection to his right 3rd toe. He was referred from his podiatrist, Dr. Melara, due to a right 3rd toe ulcer with infection that will likely require amputation. Endorses that toe is malodorous and has some purulent drainage. Endorses associated peripheral neuropathy. Denies associated toe pain, ankle or leg pain. He has had two previous amputations (left great toe and right 5th toe) due to similar conditions. Per his podiatrist, he was taking Doxy and Cipro since 11/15/2022 for the current right 3rd toe infection and per patient he has been started on IV abx upon arrival to hospital. He reports that his toe infections began around 3 years ago after stepping on piece of glass barefoot. Denies associated headache, fever, chills, CP, SOB, N/V/D, abdominal pain, urinary symptoms, numbness or tingling.       ED: VSS, AF. WBC 10, lactic acid elevated to 2.3. ESR & CRP mildly elevated, other labs unremarkable. Blood cultures pending. Right foot XR showing DJD without acute fracture or bone destruction. Given 1 L IVF and started on IV Vanc and Cefepime.

## 2022-11-22 NOTE — Clinical Note
Diagnosis: Right foot pain [[id number]]   Admitting Provider:: DEBBY PÉREZ [[id number]]   Future Attending Provider: DEBBY PÉREZ [[id number]]   Reason for IP Medical Treatment  (Clinical interventions that can only be accomplished in the IP setting? ) :: IV antibiotics, infected diabetic foot infection, podiatry evaluation and treatment/likely amputation   Estimated Length of Stay:: 2 midnights   I certify that Inpatient services for greater than or equal to 2 midnights are medically necessary:: Yes   Plans for Post-Acute care--if anticipated (pick the single best option):: A. No post acute care anticipated at this time   Special Needs:: No Special Needs [1]

## 2022-11-22 NOTE — ASSESSMENT & PLAN NOTE
Patient's FSGs are controlled on current medication regimen.  Last A1c reviewed-   Lab Results   Component Value Date    HGBA1C 7.1 (H) 11/10/2022     Most recent fingerstick glucose reviewed- No results for input(s): POCTGLUCOSE in the last 24 hours.  Current correctional scale  Low  Maintain anti-hyperglycemic dose as follows-     Hold Oral hypoglycemics while patient is in the hospital.  Antihyperglycemics (From admission, onward)    Start     Stop Route Frequency Ordered    11/22/22 1645  insulin aspart U-100 pen 3 Units         -- SubQ 3 times daily with meals 11/22/22 1422    11/22/22 1530  insulin detemir U-100 pen 9 Units         -- SubQ Daily 11/22/22 1422        Hold Oral hypoglycemics while patient is in the hospital.

## 2022-11-22 NOTE — HPI
58 yo M with hx of DM, CAD, colon cancer, L 1st ray amputation, R 5th ray amputation admitted for R 3rd toe infection. Followed outpatient by [facility], seen in Dr Melara's clinic today and sent to the ED for further workup and R 3rd toe amputation at a minimum. Patient denies pain in the R foot but admits to significant peripheral neuropathy. Denies issues with the L foot at this time. Denies fever, chills, nausea, vomiting.

## 2022-11-22 NOTE — MEDICAL/APP STUDENT
History     Chief Complaint   Patient presents with    Wound Check     States Dr. Ellis middle toe on R foot amputated. Told to come to ED. Denies pain.      HPI  Billy Sheffield Miguel is a 57 y.o. male with CAD, native coronary artery (6/25/2013), T2DM on insulin, disorder of kidney and ureter, MI (04/2012), hx of colon cancer stage I, hyperlipidemia, hypertension, NSTEMI May 2013 - peak troponin 0.22 (5/22/2013), MICHAELA (obstructive sleep apnea), peripheral neuropathy and retinopathy due to secondary diabetes presents with right foot pain. Patient notes similar intermittent foot pain ongoing for 3 years. He was referred by Dr. Melara in podiatry to [facility] for further follow up of his right 3rd toe. Pt has history of toe amputation secondary to infection of his left first toe, and right 5th toe. He started on doxy/cipro on 11/15/22 and reports compliance. Associated symptoms include numbness/tingling in 3rd toe attributed peripheral neuropathy in his right foot secondary to diabetes, purulence with surrounding erythema on 3rd right toe (based on pictures taken in ED), and diarrhea attributed to Trulicity. Pt is unsure of drainage from ulcer site. Denies fevers, chills, N/V, leg pain, chest pain, SOB, HA, or numbness/tingling in legs.    ED:  Afebrile without leukocytosis. Lactate 2.3, repeat pending. Blood cultures x 2 ordered. ESR 56, CRP 15.6. XR right foot show no fracture or bone destruction. MRI ordered. Given 1L bolus NS, benadryl and IV vancomycin/cefepime.    Past Medical History:   Diagnosis Date    Allergy     CAD (coronary artery disease), native coronary artery 6/25/2013    Cancer     COVID-19 virus detected 9/1/2020    Diabetes mellitus     Diabetes mellitus, type 2     Disorder of kidney and ureter     Heart attack 04/2012    Hx of colon cancer, stage I     Hyperlipidemia     Hypertension     Muscular pain     post-op after colonoscopy    NSTEMI May 2013 - peak troponin 0.22 5/22/2013    MICHAELA (obstructive  sleep apnea)     Retinopathy due to secondary diabetes        Past Surgical History:   Procedure Laterality Date    COLONOSCOPY N/A 2/17/2017    Procedure: COLONOSCOPY;  Surgeon: Simon Gomez MD;  Location: [facility] ENDO (4TH FLR);  Service: Endoscopy;  Laterality: N/A;    CORONARY ANGIOPLASTY      INCISION AND DRAINAGE FOOT Right 6/5/2018    Procedure: INCISION AND DRAINAGE, FOOT;  Surgeon: Bridget Santana DPM;  Location: [facility] OR 1ST FLR;  Service: Podiatry;  Laterality: Right;  Request mini C arm in room. request wound vac with medium black sponge    INCISION AND DRAINAGE FOOT Right 6/7/2018    Procedure: INCISION AND DRAINAGE, FOOT;  Surgeon: Emelia Brock DPM;  Location: [facility] OR 2ND FLR;  Service: Podiatry;  Laterality: Right;    INCISION AND DRAINAGE FOOT Left 9/4/2020    Procedure: INCISION AND DRAINAGE, FOOT;  Surgeon: Ainsley Powell DPM;  Location: [facility] OR 2ND FLR;  Service: Podiatry;  Laterality: Left;    INCISION AND DRAINAGE FOOT Left 12/22/2020    Procedure: INCISION AND DRAINAGE, FOOT;  Surgeon: Ainsley Powell DPM;  Location: [facility] OR 2ND FLR;  Service: Podiatry;  Laterality: Left;  May need micro choice  Need Jam shidi needles  Stretcher OK      INCISION AND DRAINAGE OF ABSCESS Right 6/2/2018    Procedure: INCISION AND DRAINAGE, ABSCESS;  Surgeon: Bridget Santana DPM;  Location: [facility] OR 2ND FLR;  Service: General;  Laterality: Right;    OSTEOTOMY OF METATARSAL BONE  9/4/2020    Procedure: OSTEOTOMY, METATARSAL BONE, 1st METATARSAL RESECTION;  Surgeon: Ainsley Powell DPM;  Location: [facility] OR 2ND FLR;  Service: Podiatry;;    REMOVAL OF FOREIGN BODY FROM FOOT Right 6/7/2018    Procedure: REMOVAL, FOREIGN BODY, FOOT;  Surgeon: Emelia Brock DPM;  Location: [facility] OR 2ND FLR;  Service: Podiatry;  Laterality: Right;    TOE AMPUTATION Left 7/4/2020    Procedure: AMPUTATION, TOE;  Surgeon: Bridget Santana DPM;  Location: [facility] OR 2ND FLR;  Service: Podiatry;  Laterality: Left;    TOE AMPUTATION Left  "10/14/2020    Procedure: AMPUTATION, TOE;  Surgeon: Mingo Melraa DPM;  Location: [facility] OR 2ND FLR;  Service: Podiatry;  Laterality: Left;  stretcher OK    TOE AMPUTATION Right 6/9/2022    Procedure: AMPUTATION, TOE - Dr. Melara @ [place] in am;  Surgeon: Mingo Melara DPM;  Location: [facility] OR 1ST FLR;  Service: Podiatry;  Laterality: Right;    TONSILLECTOMY         Family History   Problem Relation Age of Onset    Heart disease Mother     Diabetes Mother     Diabetes Father     Heart disease Brother     Diabetes Maternal Grandmother     Diabetes Maternal Grandfather     Diabetes Paternal Grandmother     Diabetes Paternal Grandfather     Anesthesia problems Neg Hx        Social History     Tobacco Use    Smoking status: Never    Smokeless tobacco: Never   Substance Use Topics    Alcohol use: Yes     Comment: rare x1 beer-     Drug use: No       Review of Systems   Constitutional:  Negative for chills, diaphoresis, fatigue and fever.   HENT: Negative.     Eyes:  Negative for visual disturbance.   Respiratory:  Negative for cough, chest tightness and shortness of breath.    Cardiovascular:  Negative for chest pain, palpitations and leg swelling.   Gastrointestinal:  Positive for diarrhea. Negative for abdominal distention, abdominal pain, blood in stool, nausea and vomiting.   Genitourinary:  Negative for decreased urine volume, difficulty urinating, dysuria, frequency, hematuria and urgency.   Skin:  Positive for color change and wound.   Neurological:  Positive for numbness. Negative for dizziness, weakness, light-headedness and headaches.     Physical Exam   /65   Pulse 62   Temp 97.9 °F (36.6 °C) (Oral)   Resp 16   Ht 6' 2" (1.88 m)   Wt 127 kg (280 lb)   SpO2 98%   BMI 35.95 kg/m²     Physical Exam    ED Course     Right Foot Pain  - Patient with CAD, native coronary artery (6/25/2013), T2DM on insulin, disorder of kidney and ureter, MI (04/2012), hx of colon cancer stage I, hyperlipidemia, " hypertension, NSTEMI May 2013, MICHAELA, peripheral neuropathy and retinopathy due to secondary diabetes presents with hx of ongoing right foot pain for 3 years. Based on patient's history and physical exam findings, differential diagnoses are most consistent with, but not limited to diabetic ulcer infection, sepsis, and osteomyelitis.  - Afebrile without leukocytosis. Lactate 2.3, repeat pending. Blood cultures x 2 ordered. ESR 56, CRP 15.6. XR right foot show no fracture or bone destruction.  - culture of right foot 5th toe 6/9/22 positive with strep agalactiae and prevotella bivia  - per media in patient's chart, localized purulence and surrounding erythema on ulcer localized on dorsal right third toe and on palmar right foot.  - dressing in place on right foot, dry and intact. Some 1+ BLE pitting edema from calves to ankles noted. Negative Isa's sign.   - repeat lactate q6hr from initial collection  - blood cultures x 2 pending  - MRI right foot ordered  - podiatry consulted, pending recommendations - planned amputation of right 3rd toe 11/23/22 - patient is aware and demonstrates understanding of plan  - diabetic diet, NPO at midnight  - start oxycodone 5 and 10mg for pain  - d/c doxy and cipro  - continue IV vanc/cefepime  - hold insulin pump at this time - will start SSI  - daily BMP, magnesium, phosphorus - replete lytes as needed  - start heparin q8hrs for TTE prophylaxis

## 2022-11-22 NOTE — ASSESSMENT & PLAN NOTE
Right foot pain    - pt with 2 previous toe amputations for similar infections - left great toe and right 5th toe - most recent 06/2022  - previous cultures 06/2022 significant for strep agalactiae (GBS) and prevotella bivia  - cultures by podiatry clinic 11/15/2022 significant for staph aureus and pasturella multocida  - ESR & CRP elevated  - WBC without leukocytosis  - current blood cultures pending, will follow  - will repeat lactic and follow results  - podiatry consulted, will follow recs   - hold doxy and cipro  - continue IV Vanc and Cefepime   - keep foot elevated and dry  - pt currently pain free, pain medication prn if needed   - hold home diabetic medications

## 2022-11-23 LAB
ALBUMIN SERPL BCP-MCNC: 3 G/DL (ref 3.5–5.2)
ALP SERPL-CCNC: 89 U/L (ref 55–135)
ALT SERPL W/O P-5'-P-CCNC: 25 U/L (ref 10–44)
ANION GAP SERPL CALC-SCNC: 9 MMOL/L (ref 8–16)
AST SERPL-CCNC: 18 U/L (ref 10–40)
BILIRUB SERPL-MCNC: 0.5 MG/DL (ref 0.1–1)
BUN SERPL-MCNC: 19 MG/DL (ref 6–20)
CALCIUM SERPL-MCNC: 9.4 MG/DL (ref 8.7–10.5)
CHLORIDE SERPL-SCNC: 102 MMOL/L (ref 95–110)
CO2 SERPL-SCNC: 23 MMOL/L (ref 23–29)
CREAT SERPL-MCNC: 0.9 MG/DL (ref 0.5–1.4)
EST. GFR  (NO RACE VARIABLE): >60 ML/MIN/1.73 M^2
GLUCOSE SERPL-MCNC: 122 MG/DL (ref 70–110)
MAGNESIUM SERPL-MCNC: 1.7 MG/DL (ref 1.6–2.6)
PHOSPHATE SERPL-MCNC: 3.5 MG/DL (ref 2.7–4.5)
POCT GLUCOSE: 135 MG/DL (ref 70–110)
POCT GLUCOSE: 151 MG/DL (ref 70–110)
POCT GLUCOSE: 188 MG/DL (ref 70–110)
POCT GLUCOSE: 327 MG/DL (ref 70–110)
POTASSIUM SERPL-SCNC: 4.4 MMOL/L (ref 3.5–5.1)
PROT SERPL-MCNC: 6.5 G/DL (ref 6–8.4)
SODIUM SERPL-SCNC: 134 MMOL/L (ref 136–145)

## 2022-11-23 PROCEDURE — 63600175 PHARM REV CODE 636 W HCPCS

## 2022-11-23 PROCEDURE — 84100 ASSAY OF PHOSPHORUS: CPT

## 2022-11-23 PROCEDURE — 99499 UNLISTED E&M SERVICE: CPT | Mod: ,,, | Performed by: PODIATRIST

## 2022-11-23 PROCEDURE — 80053 COMPREHEN METABOLIC PANEL: CPT

## 2022-11-23 PROCEDURE — 99222 PR INITIAL HOSPITAL CARE,LEVL II: ICD-10-PCS | Mod: ,,, | Performed by: NURSE PRACTITIONER

## 2022-11-23 PROCEDURE — 63600175 PHARM REV CODE 636 W HCPCS: Performed by: HOSPITALIST

## 2022-11-23 PROCEDURE — 63600175 PHARM REV CODE 636 W HCPCS: Performed by: NURSE PRACTITIONER

## 2022-11-23 PROCEDURE — 36415 COLL VENOUS BLD VENIPUNCTURE: CPT

## 2022-11-23 PROCEDURE — 99222 1ST HOSP IP/OBS MODERATE 55: CPT | Mod: ,,, | Performed by: NURSE PRACTITIONER

## 2022-11-23 PROCEDURE — 99499 NO LOS: ICD-10-PCS | Mod: ,,, | Performed by: PODIATRIST

## 2022-11-23 PROCEDURE — 94761 N-INVAS EAR/PLS OXIMETRY MLT: CPT

## 2022-11-23 PROCEDURE — 25000003 PHARM REV CODE 250: Performed by: NURSE PRACTITIONER

## 2022-11-23 PROCEDURE — 11000001 HC ACUTE MED/SURG PRIVATE ROOM

## 2022-11-23 PROCEDURE — 99233 SBSQ HOSP IP/OBS HIGH 50: CPT | Mod: ,,, | Performed by: HOSPITALIST

## 2022-11-23 PROCEDURE — 25000003 PHARM REV CODE 250

## 2022-11-23 PROCEDURE — 83735 ASSAY OF MAGNESIUM: CPT

## 2022-11-23 PROCEDURE — 99233 PR SUBSEQUENT HOSPITAL CARE,LEVL III: ICD-10-PCS | Mod: ,,, | Performed by: HOSPITALIST

## 2022-11-23 RX ORDER — GLUCAGON 1 MG
1 KIT INJECTION
Status: DISCONTINUED | OUTPATIENT
Start: 2022-11-23 | End: 2022-11-23 | Stop reason: SDUPTHER

## 2022-11-23 RX ORDER — IBUPROFEN 200 MG
16 TABLET ORAL
Status: DISCONTINUED | OUTPATIENT
Start: 2022-11-23 | End: 2022-11-23 | Stop reason: SDUPTHER

## 2022-11-23 RX ORDER — INSULIN ASPART 100 [IU]/ML
0-5 INJECTION, SOLUTION INTRAVENOUS; SUBCUTANEOUS
Status: DISCONTINUED | OUTPATIENT
Start: 2022-11-23 | End: 2022-11-27

## 2022-11-23 RX ORDER — INSULIN ASPART 100 [IU]/ML
3-6 INJECTION, SOLUTION INTRAVENOUS; SUBCUTANEOUS
Status: DISCONTINUED | OUTPATIENT
Start: 2022-11-23 | End: 2022-11-25

## 2022-11-23 RX ORDER — IBUPROFEN 200 MG
24 TABLET ORAL
Status: DISCONTINUED | OUTPATIENT
Start: 2022-11-23 | End: 2022-11-23 | Stop reason: SDUPTHER

## 2022-11-23 RX ADMIN — ATORVASTATIN CALCIUM 80 MG: 40 TABLET, FILM COATED ORAL at 09:11

## 2022-11-23 RX ADMIN — HEPARIN SODIUM 5000 UNITS: 5000 INJECTION INTRAVENOUS; SUBCUTANEOUS at 09:11

## 2022-11-23 RX ADMIN — CARVEDILOL 12.5 MG: 12.5 TABLET, FILM COATED ORAL at 08:11

## 2022-11-23 RX ADMIN — OXYCODONE HYDROCHLORIDE AND ACETAMINOPHEN 500 MG: 500 TABLET ORAL at 08:11

## 2022-11-23 RX ADMIN — INSULIN DETEMIR 9 UNITS: 100 INJECTION, SOLUTION SUBCUTANEOUS at 09:11

## 2022-11-23 RX ADMIN — HEPARIN SODIUM 5000 UNITS: 5000 INJECTION INTRAVENOUS; SUBCUTANEOUS at 02:11

## 2022-11-23 RX ADMIN — INSULIN ASPART 4 UNITS: 100 INJECTION, SOLUTION INTRAVENOUS; SUBCUTANEOUS at 04:11

## 2022-11-23 RX ADMIN — INSULIN DETEMIR 9 UNITS: 100 INJECTION, SOLUTION SUBCUTANEOUS at 08:11

## 2022-11-23 RX ADMIN — ASPIRIN 81 MG: 81 TABLET, COATED ORAL at 08:11

## 2022-11-23 RX ADMIN — CEFTRIAXONE 2 G: 2 INJECTION, POWDER, FOR SOLUTION INTRAMUSCULAR; INTRAVENOUS at 11:11

## 2022-11-23 RX ADMIN — CARVEDILOL 12.5 MG: 12.5 TABLET, FILM COATED ORAL at 09:11

## 2022-11-23 RX ADMIN — INSULIN ASPART 6 UNITS: 100 INJECTION, SOLUTION INTRAVENOUS; SUBCUTANEOUS at 04:11

## 2022-11-23 RX ADMIN — LISINOPRIL 40 MG: 20 TABLET ORAL at 08:11

## 2022-11-23 NOTE — ASSESSMENT & PLAN NOTE
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 X ray unremarkable. MRI + for right 3rd middle and proximal phalanx OM. Bone culture + for Staph aureus and Pasteurella. Pedal pulses diminished with R CODY 1.1 and L VASILIY 1.2.     Plan:  -Planning for surgical management of osteomyelitis with right 3rd toe/ray amputation. No OR availability today, primary team informed. Surgical timing to be determined. Will discuss with holiday staff and OR.   -Antibiotic plan per primary team.   -RLE WB to heel in surgical shoe for transfers or short distances. LLE WBAT.  -Dressing changes by nursing.   -Podiatry will follow.

## 2022-11-23 NOTE — PROGRESS NOTES
[facility] Medicine  Progress Note    Patient Name: Billy Rivera  MRN: [id number]  Patient Class: IP- Inpatient   Admission Date: 11/22/2022  Length of Stay: 1 days  Attending Physician: Katerine Tomlin MD  Primary Care Provider: BRAD Baker MD        Subjective:     Principal Problem:Diabetic foot infection        HPI:  Billy Rivera is a 57 y.o. male with a history of type 2 diabetes, CAD, HTN, and colon cancer admitted to [facility] with diabetic foot infection to his right 3rd toe. He was referred from his podiatrist, Dr. Melara, due to a right 3rd toe ulcer with infection that will likely require amputation. Endorses that toe is malodorous and has some purulent drainage. Endorses associated peripheral neuropathy. Denies associated toe pain, ankle or leg pain. He has had two previous amputations (left great toe and right 5th toe) due to similar conditions. Per his podiatrist, he was taking Doxy and Cipro since 11/15/2022 for the current right 3rd toe infection and per patient he has been started on IV abx upon arrival to hospital. He reports that his toe infections began around 3 years ago after stepping on piece of glass barefoot. Denies associated headache, fever, chills, CP, SOB, N/V/D, abdominal pain, urinary symptoms, numbness or tingling.       ED: VSS, AF. WBC 10, lactic acid elevated to 2.3. ESR & CRP mildly elevated, other labs unremarkable. Blood cultures pending. Right foot XR showing DJD without acute fracture or bone destruction. Given 1 L IVF and started on IV Vanc and Cefepime.      Overview/Hospital Course:  No notes on file    Interval History:   11/23: MRI completed    Review of Systems   Constitutional:  Negative for activity change, chills, diaphoresis and fever.   HENT:  Negative for trouble swallowing.    Eyes:  Negative for photophobia and visual disturbance.   Respiratory:  Negative for cough, chest tightness, shortness of breath and wheezing.     Cardiovascular:  Negative for chest pain, palpitations and leg swelling.   Gastrointestinal:  Negative for abdominal pain, constipation, diarrhea, nausea and vomiting.   Genitourinary:  Negative for dysuria, frequency, hematuria and urgency.   Musculoskeletal:  Negative for arthralgias, back pain and gait problem.   Skin:  Positive for color change (right 3rd toe) and wound (right 3rd toe). Negative for rash.   Neurological:  Negative for dizziness, syncope, weakness, light-headedness, numbness and headaches.   Psychiatric/Behavioral:  Negative for agitation and confusion. The patient is not nervous/anxious.    Objective:     Vital Signs (Most Recent):  Temp: 98.4 °F (36.9 °C) (11/23/22 0729)  Pulse: (!) 57 (11/23/22 0730)  Resp: 18 (11/23/22 0729)  BP: 137/64 (11/23/22 0729)  SpO2: 97 % (11/23/22 0729)   Vital Signs (24h Range):  Temp:  [97.6 °F (36.4 °C)-98.4 °F (36.9 °C)] 98.4 °F (36.9 °C)  Pulse:  [53-61] 57  Resp:  [18-20] 18  SpO2:  [93 %-98 %] 97 %  BP: ()/(51-90) 137/64     Weight: 127 kg (280 lb)  Body mass index is 35.95 kg/m².    Intake/Output Summary (Last 24 hours) at 11/23/2022 1023  Last data filed at 11/22/2022 2044  Gross per 24 hour   Intake 1550.01 ml   Output --   Net 1550.01 ml      Physical Exam  Vitals and nursing note reviewed.   Constitutional:       General: He is not in acute distress.     Appearance: He is well-developed. He is obese. He is not toxic-appearing or diaphoretic.   HENT:      Head: Normocephalic and atraumatic.      Mouth/Throat:      Mouth: Mucous membranes are moist.      Pharynx: No oropharyngeal exudate.   Eyes:      Conjunctiva/sclera: Conjunctivae normal.   Cardiovascular:      Rate and Rhythm: Normal rate and regular rhythm.      Heart sounds: Normal heart sounds.   Pulmonary:      Effort: Pulmonary effort is normal. No respiratory distress.      Breath sounds: Wheezing (end expiratory) present.   Abdominal:      General: Bowel sounds are normal. There is no  distension.      Palpations: Abdomen is soft.      Tenderness: There is no abdominal tenderness.   Musculoskeletal:      Cervical back: Normal range of motion and neck supple.      Right lower leg: Edema (pitting tibial) present.      Left lower leg: Edema (pitting tibial) present.      Comments: Unable to assess ROM, swelling, tenderness or infection site as patient's toe was wrapped in dressing. Dressing clean, dry and intact.   Lymphadenopathy:      Cervical: No cervical adenopathy.   Skin:     General: Skin is warm and dry.      Capillary Refill: Capillary refill takes less than 2 seconds.      Findings: Erythema (see comment) present. No rash.      Comments: During exam patient's toe was dressed. Dressing clean, dry and intact. Per pictures in patient chart - right 3rd toe with ulcer on lateral aspect, purulent drainage. Additional ulcer on ventral aspect of foot.   Neurological:      Mental Status: He is alert and oriented to person, place, and time.      Cranial Nerves: No cranial nerve deficit.      Sensory: No sensory deficit.      Coordination: Coordination normal.   Psychiatric:         Behavior: Behavior normal.         Thought Content: Thought content normal.         Judgment: Judgment normal.       Significant Labs: All pertinent labs within the past 24 hours have been reviewed.    Significant Imaging: I have reviewed all pertinent imaging results/findings within the past 24 hours.      Assessment/Plan:      * Diabetic foot infection  Right foot pain    - pt with 2 previous toe amputations for similar infections - left great toe and right 5th toe - most recent 06/2022  - previous cultures 06/2022 significant for strep agalactiae (GBS) and prevotella bivia  - cultures by podiatry clinic 11/15/2022 significant for staph aureus and pasturella multocida  - ESR & CRP elevated  - WBC without leukocytosis  - current blood cultures pending, will follow  - will repeat lactic and follow results  - podiatry  consulted, will follow recs  - Tentative plan for amputation 3rd toe right  - rocephin should cover the staph and pasturella from 11/15 bone biopsy  - keep foot elevated and dry  - pt currently pain free, pain medication prn if needed   - hold home diabetic medications    Right foot pain  See diabetic foot infection      CAD (coronary artery disease)  - continue HTN management   - ekg prn for chest pain    Diabetic foot ulcer associated with type 2 diabetes mellitus  Patient's FSGs are controlled on current medication regimen.  Last A1c reviewed-   Lab Results   Component Value Date    HGBA1C 7.1 (H) 11/10/2022     Most recent fingerstick glucose reviewed- No results for input(s): POCTGLUCOSE in the last 24 hours.  Current correctional scale  Low  Maintain anti-hyperglycemic dose as follows-     Hold Oral hypoglycemics while patient is in the hospital.  Antihyperglycemics (From admission, onward)    Start     Stop Route Frequency Ordered    11/22/22 1645  insulin aspart U-100 pen 3 Units         -- SubQ 3 times daily with meals 11/22/22 1422 11/22/22 1530  insulin detemir U-100 pen 9 Units         -- SubQ Daily 11/22/22 1422        Hold Oral hypoglycemics while patient is in the hospital.    Hypertension associated with diabetes  - continue coreg 12.5 mg BID  - continue lisinopril 40 mg daily      VTE Risk Mitigation (From admission, onward)         Ordered     heparin (porcine) injection 5,000 Units  Every 8 hours         11/22/22 1422     IP VTE HIGH RISK PATIENT  Once         11/22/22 1422     Place sequential compression device  Until discontinued         11/22/22 1422                Discharge Planning   DARVIN: 11/26/2022     Code Status: Full Code   Is the patient medically ready for discharge?:     Reason for patient still in hospital (select all that apply): Patient new problem                     Katerine Tomlin MD  Department of Hospital Medicine   [facility] Surg

## 2022-11-23 NOTE — ASSESSMENT & PLAN NOTE
Right foot pain    - pt with 2 previous toe amputations for similar infections - left great toe and right 5th toe - most recent 06/2022  - previous cultures 06/2022 significant for strep agalactiae (GBS) and prevotella bivia  - cultures by podiatry clinic 11/15/2022 significant for staph aureus and pasturella multocida  - ESR & CRP elevated  - WBC without leukocytosis  - current blood cultures pending, will follow  - will repeat lactic and follow results  - podiatry consulted, will follow recs  - Tentative plan for amputation 3rd toe right  - rocephin should cover the staph and pasturella from 11/15 bone biopsy  - keep foot elevated and dry  - pt currently pain free, pain medication prn if needed   - hold home diabetic medications

## 2022-11-23 NOTE — HPI
"Reason for Consult: Management of T2DM, Hyperglycemia      Diabetes diagnosis year:   "Around 39 years old"     Home Diabetes Medications:    Metformin 1000 mg BID; Farxiga 5 mg and Trulicity 1.5 mg weekly.   (Pump not available to review settings at time of consult; settings taken from previous note).   Medtronic 770G  Pump Settings:  Basal 1.9 units/hr  ICR: 1:56 (patient uses pre-set bolus parameters 5 units for snack; 10 units for small meals; 15 units for large meals)  ISF 1:25  Target B-120 mg/dL  IOB: 4 hours     How often checking glucose at home? 1-3 x day   BG readings on regimen: 100-200's  Hypoglycemia on the regimen?  No  Missed doses on regimen?  No     Diabetes Complications include:     Hyperglycemia, Diabetic retinopathy , Foot ulcer. Foot wound,   and Other skin ulcer     Complicating diabetes co morbidities:   History of MI and MICHAELA, CAD, HTN, HLD,         HPI:Billy Rivera is a 57 y.o. male with a history of type 2 diabetes, CAD, HTN, and colon cancer admitted to [facility] with diabetic foot infection to his right 3rd toe. He was referred from his podiatrist, Dr. Melara, due to a right 3rd toe ulcer with infection that will likely require amputation. Endorses that toe is malodorous and has some purulent drainage. Endorses associated peripheral neuropathy. Denies associated toe pain, ankle or leg pain. He has had two previous amputations (left great toe and right 5th toe) due to similar conditions. Per his podiatrist, he was taking Doxy and Cipro since 11/15/2022 for the current right 3rd toe infection and per patient he has been started on IV abx upon arrival to hospital. He reports that his toe infections began around 3 years ago after stepping on piece of glass barefoot. Denies associated headache, fever, chills, CP, SOB, N/V/D, abdominal pain, urinary symptoms, numbness or tingling. Endocrine consulted to manage type 2 diabetes and hyperglycemia.     Lab Results   Component Value " Date    [facility] 7.1 (H) 11/10/2022

## 2022-11-23 NOTE — ED NOTES
Pt placed on tele box #[id number]. HR 63 verified, sinus rhythm with artifact due to pt having lots of chest hair

## 2022-11-23 NOTE — CONSULTS
"[facility]  Endocrinology  Diabetes Consult Note    Consult Requested by: Katerine Tomlin MD   Reason for admit: Diabetic foot infection    HISTORY OF PRESENT ILLNESS:  Reason for Consult: Management of T2DM, Hyperglycemia      Diabetes diagnosis year:   "Around 39 years old"     Home Diabetes Medications:    Metformin 1000 mg BID; Farxiga 5 mg and Trulicity 1.5 mg weekly.   (Pump not available to review settings at time of consult; settings taken from previous note).   Medtronic 770G  Pump Settings:  Basal 1.9 units/hr  ICR: 1:56 (patient uses pre-set bolus parameters 5 units for snack; 10 units for small meals; 15 units for large meals)  ISF 1:25  Target B-120 mg/dL  IOB: 4 hours     How often checking glucose at home? 1-3 x day   BG readings on regimen: 100-200's  Hypoglycemia on the regimen?  No  Missed doses on regimen?  No     Diabetes Complications include:     Hyperglycemia, Diabetic retinopathy , Foot ulcer. Foot wound,   and Other skin ulcer     Complicating diabetes co morbidities:   History of MI and MICHAELA, CAD, HTN, HLD,         HPI:Billy Rivera is a 57 y.o. male with a history of type 2 diabetes, CAD, HTN, and colon cancer admitted to [facility] with diabetic foot infection to his right 3rd toe. He was referred from his podiatrist, Dr. Melara, due to a right 3rd toe ulcer with infection that will likely require amputation. Endorses that toe is malodorous and has some purulent drainage. Endorses associated peripheral neuropathy. Denies associated toe pain, ankle or leg pain. He has had two previous amputations (left great toe and right 5th toe) due to similar conditions. Per his podiatrist, he was taking Doxy and Cipro since 11/15/2022 for the current right 3rd toe infection and per patient he has been started on IV abx upon arrival to hospital. He reports that his toe infections began around 3 years ago after stepping on piece of glass barefoot. Denies associated headache, fever, " chills, CP, SOB, N/V/D, abdominal pain, urinary symptoms, numbness or tingling. Endocrine consulted to manage type 2 diabetes and hyperglycemia.     Lab Results   Component Value Date    HGBA1C 7.1 (H) 11/10/2022              Interval HPI:   Overnight events: No acute events overnight. Patient on the [facility] in room 603/603 A. Blood glucose stable. BG at goal on current insulin regimen (SSI, prandial, and basal insulin ). Steroid use- None. Day of Surgery  Renal function- Normal   Vasopressors-  None       Endocrine will continue to follow and manage insulin orders inpatient.         Diet NPO     Eating:   NPO  Nausea: No  Hypoglycemia and intervention: No  Fever: No  TPN and/or TF: No    PMH, PSH, FH, SH updated and reviewed     ROS:  Review of Systems  Constitutional: Negative for weight changes.  Eyes: Negative for visual disturbance.  Respiratory: Negative for cough.   Cardiovascular: Negative for chest pain.  Gastrointestinal: Negative for nausea.  Endocrine: Negative for polyuria, polydipsia.  Musculoskeletal: Negative for back pain.  Skin: Negative for rash.  Neurological: Negative for syncope.  Psychiatric/Behavioral: Negative for depression.    Current Medications and/or Treatments Impacting Glycemic Control  Immunotherapy:    Immunosuppressants       None          Steroids:   Hormones (From admission, onward)      Start     Stop Route Frequency Ordered    11/22/22 1511  melatonin tablet 6 mg         -- Oral Nightly PRN 11/22/22 1422          Pressors:    Autonomic Drugs (From admission, onward)      None          Hyperglycemia/Diabetes Medications:   Antihyperglycemics (From admission, onward)      Start     Stop Route Frequency Ordered    11/22/22 1645  insulin aspart U-100 pen 3 Units         -- SubQ 3 times daily with meals 11/22/22 1422    11/22/22 1530  insulin detemir U-100 pen 9 Units         -- SubQ Daily 11/22/22 1422             PHYSICAL EXAMINATION:  Vitals:    11/23/22 1122   BP: 127/68   Pulse:  (!) 55   Resp: 18   Temp: 98.6 °F (37 °C)     Body mass index is 35.95 kg/m².    Physical Exam  Constitutional: Well developed, well nourished, NAD.  ENT: External ears no masses with nose patent; normal hearing.  Neck: Supple; trachea midline.  Cardiovascular: Normal heart sounds, no LE edema. DP +2 bilaterally.  Lungs: Normal effort; lungs anterior bilaterally clear to auscultation.  Abdomen: Soft, no masses, no hernias.  MS: No clubbing or cyanosis of nails noted; unable to assess gait.  Skin: No rashes, lesions, or ulcers; no nodules. Injection sites are ok. No lipo hypertropthy or atrophy.  Psychiatric: Good judgement and insight; normal mood and affect.  Neurological: Cranial nerves are grossly intact.  Foot: Nails in poor condition, no amputations noted- pending amputation today.        Labs Reviewed and Include   Recent Labs   Lab 11/23/22  0253   *   CALCIUM 9.4   ALBUMIN 3.0*   PROT 6.5   *   K 4.4   CO2 23      BUN 19   CREATININE 0.9   ALKPHOS 89   ALT 25   AST 18   BILITOT 0.5     Lab Results   Component Value Date    WBC 10.62 11/22/2022    HGB 14.7 11/22/2022    HCT 43.2 11/22/2022    MCV 88 11/22/2022     11/22/2022     No results for input(s): TSH, FREET4 in the last 168 hours.  Lab Results   Component Value Date    HGBA1C 7.1 (H) 11/10/2022       Nutritional status:   Body mass index is 35.95 kg/m².  Lab Results   Component Value Date    ALBUMIN 3.0 (L) 11/23/2022    ALBUMIN 3.4 (L) 11/22/2022    ALBUMIN 3.8 11/10/2022     Lab Results   Component Value Date    PREALBUMIN 22 07/16/2018    PREALBUMIN 21 07/09/2018    PREALBUMIN 20 07/02/2018       Estimated Creatinine Clearance: 126.7 mL/min (based on SCr of 0.9 mg/dL).    Accu-Checks  Recent Labs     11/22/22  1551 11/23/22  0851 11/23/22  1120   POCTGLUCOSE 105 151* 135*        ASSESSMENT and PLAN    * Diabetic foot infection  Optimize BG control to improve wound healing  Managed per primary team  Avoid  hypoglycemia        Type 2 diabetes mellitus with diabetic peripheral angiopathy without gangrene, with long-term current use of insulin  Endocrinology consulted for BG management.   BG goal 140-180    - Patient does not have pump supplies and is not a good candidate for pump therapy at this time.     Primary team started the following regimen L 9 QD; N3 TIDWM + SSI    Weight-based at 0.3 units/kg/day (basal prandial matching- given patients BG within goal with current insulin regimen, anticipate excursions when diet is advanced).   - Levemir (Insulin Detemir) 9 units BID  - Novolog (Insulin Aspart) 3-6 units TIDWM and prn for BG excursions LDC SSI (150/50)  - BG checks AC/HS  - Hypoglycemia protocol in place  - If blood glucose greater than 300, please ask patient not to eat food or drink anything other than water until correctional insulin has brought it back below 250    ** Please notify Endocrine for any change and/or advance in diet**  ** Please call Endocrine for any BG related issues **    Discharge Planning:   TBD. Please notify endocrinology prior to discharge.  - If discharged today would recommend resuming home insulin pump given BG improvement with a 30% reduction in temp basal given 9 units of Levemir will still be active.     Hypertension associated with diabetes    Uncontrolled HTN can result in worsening insulin resistance. On an ACE-I per ADA guidelines.         Plan discussed with patient, family, and RN at bedside.      Kenneth Hughes, DNP, FNP  Endocrinology  [facility] - Med Surg

## 2022-11-23 NOTE — SUBJECTIVE & OBJECTIVE
Interval History:   11/23: MRI completed    Review of Systems   Constitutional:  Negative for activity change, chills, diaphoresis and fever.   HENT:  Negative for trouble swallowing.    Eyes:  Negative for photophobia and visual disturbance.   Respiratory:  Negative for cough, chest tightness, shortness of breath and wheezing.    Cardiovascular:  Negative for chest pain, palpitations and leg swelling.   Gastrointestinal:  Negative for abdominal pain, constipation, diarrhea, nausea and vomiting.   Genitourinary:  Negative for dysuria, frequency, hematuria and urgency.   Musculoskeletal:  Negative for arthralgias, back pain and gait problem.   Skin:  Positive for color change (right 3rd toe) and wound (right 3rd toe). Negative for rash.   Neurological:  Negative for dizziness, syncope, weakness, light-headedness, numbness and headaches.   Psychiatric/Behavioral:  Negative for agitation and confusion. The patient is not nervous/anxious.    Objective:     Vital Signs (Most Recent):  Temp: 98.4 °F (36.9 °C) (11/23/22 0729)  Pulse: (!) 57 (11/23/22 0730)  Resp: 18 (11/23/22 0729)  BP: 137/64 (11/23/22 0729)  SpO2: 97 % (11/23/22 0729)   Vital Signs (24h Range):  Temp:  [97.6 °F (36.4 °C)-98.4 °F (36.9 °C)] 98.4 °F (36.9 °C)  Pulse:  [53-61] 57  Resp:  [18-20] 18  SpO2:  [93 %-98 %] 97 %  BP: ()/(51-90) 137/64     Weight: 127 kg (280 lb)  Body mass index is 35.95 kg/m².    Intake/Output Summary (Last 24 hours) at 11/23/2022 1023  Last data filed at 11/22/2022 2044  Gross per 24 hour   Intake 1550.01 ml   Output --   Net 1550.01 ml      Physical Exam  Vitals and nursing note reviewed.   Constitutional:       General: He is not in acute distress.     Appearance: He is well-developed. He is obese. He is not toxic-appearing or diaphoretic.   HENT:      Head: Normocephalic and atraumatic.      Mouth/Throat:      Mouth: Mucous membranes are moist.      Pharynx: No oropharyngeal exudate.   Eyes:      Conjunctiva/sclera:  Conjunctivae normal.   Cardiovascular:      Rate and Rhythm: Normal rate and regular rhythm.      Heart sounds: Normal heart sounds.   Pulmonary:      Effort: Pulmonary effort is normal. No respiratory distress.      Breath sounds: Wheezing (end expiratory) present.   Abdominal:      General: Bowel sounds are normal. There is no distension.      Palpations: Abdomen is soft.      Tenderness: There is no abdominal tenderness.   Musculoskeletal:      Cervical back: Normal range of motion and neck supple.      Right lower leg: Edema (pitting tibial) present.      Left lower leg: Edema (pitting tibial) present.      Comments: Unable to assess ROM, swelling, tenderness or infection site as patient's toe was wrapped in dressing. Dressing clean, dry and intact.   Lymphadenopathy:      Cervical: No cervical adenopathy.   Skin:     General: Skin is warm and dry.      Capillary Refill: Capillary refill takes less than 2 seconds.      Findings: Erythema (see comment) present. No rash.      Comments: During exam patient's toe was dressed. Dressing clean, dry and intact. Per pictures in patient chart - right 3rd toe with ulcer on lateral aspect, purulent drainage. Additional ulcer on ventral aspect of foot.   Neurological:      Mental Status: He is alert and oriented to person, place, and time.      Cranial Nerves: No cranial nerve deficit.      Sensory: No sensory deficit.      Coordination: Coordination normal.   Psychiatric:         Behavior: Behavior normal.         Thought Content: Thought content normal.         Judgment: Judgment normal.       Significant Labs: All pertinent labs within the past 24 hours have been reviewed.    Significant Imaging: I have reviewed all pertinent imaging results/findings within the past 24 hours.

## 2022-11-23 NOTE — SUBJECTIVE & OBJECTIVE
Interval HPI:   Overnight events: No acute events overnight. Patient on the MSU in room 603/603 A. Blood glucose stable. BG at goal on current insulin regimen (SSI, prandial, and basal insulin ). Steroid use- None. Day of Surgery  Renal function- Normal   Vasopressors-  None       Endocrine will continue to follow and manage insulin orders inpatient.         Diet NPO     Eating:   NPO  Nausea: No  Hypoglycemia and intervention: No  Fever: No  TPN and/or TF: No    PMH, PSH, FH, SH updated and reviewed     ROS:  Review of Systems  Constitutional: Negative for weight changes.  Eyes: Negative for visual disturbance.  Respiratory: Negative for cough.   Cardiovascular: Negative for chest pain.  Gastrointestinal: Negative for nausea.  Endocrine: Negative for polyuria, polydipsia.  Musculoskeletal: Negative for back pain.  Skin: Negative for rash.  Neurological: Negative for syncope.  Psychiatric/Behavioral: Negative for depression.    Current Medications and/or Treatments Impacting Glycemic Control  Immunotherapy:    Immunosuppressants       None          Steroids:   Hormones (From admission, onward)      Start     Stop Route Frequency Ordered    11/22/22 1511  melatonin tablet 6 mg         -- Oral Nightly PRN 11/22/22 1422          Pressors:    Autonomic Drugs (From admission, onward)      None          Hyperglycemia/Diabetes Medications:   Antihyperglycemics (From admission, onward)      Start     Stop Route Frequency Ordered    11/22/22 1645  insulin aspart U-100 pen 3 Units         -- SubQ 3 times daily with meals 11/22/22 1422    11/22/22 1530  insulin detemir U-100 pen 9 Units         -- SubQ Daily 11/22/22 1422             PHYSICAL EXAMINATION:  Vitals:    11/23/22 1122   BP: 127/68   Pulse: (!) 55   Resp: 18   Temp: 98.6 °F (37 °C)     Body mass index is 35.95 kg/m².    Physical Exam  Constitutional: Well developed, well nourished, NAD.  ENT: External ears no masses with nose patent; normal hearing.  Neck: Supple;  trachea midline.  Cardiovascular: Normal heart sounds, no LE edema. DP +2 bilaterally.  Lungs: Normal effort; lungs anterior bilaterally clear to auscultation.  Abdomen: Soft, no masses, no hernias.  MS: No clubbing or cyanosis of nails noted; unable to assess gait.  Skin: No rashes, lesions, or ulcers; no nodules. Injection sites are ok. No lipo hypertropthy or atrophy.  Psychiatric: Good judgement and insight; normal mood and affect.  Neurological: Cranial nerves are grossly intact.  Foot: Nails in poor condition, no amputations noted- pending amputation today.

## 2022-11-23 NOTE — PROGRESS NOTES
[facility]  Podiatry  Progress Note    Patient Name: Billy Sheffield Miguel  MRN: [id number]  Admission Date: 11/22/2022  Hospital Length of Stay: 1 days  Attending Physician: Katerine Tomlin MD  Primary Care Provider: BRAD Baker MD     Subjective:     Interval History: Bradycardic, other vitals stable. No new pedal complaints. Dressings clean dry, intact. MRI + for R 3rd toe OM only.     Follow-up For: Procedure(s) (LRB):  AMPUTATION, TOE (Right)    Post-Operative Day: Day of Surgery    Scheduled Meds:   ascorbic acid (vitamin C)  500 mg Oral Daily    aspirin  81 mg Oral Daily    atorvastatin  80 mg Oral QHS    carvediloL  12.5 mg Oral BID    cefTRIAXone (ROCEPHIN) IVPB  2 g Intravenous Q24H    heparin (porcine)  5,000 Units Subcutaneous Q8H    insulin aspart U-100  3-6 Units Subcutaneous TIDWM    insulin detemir U-100  9 Units Subcutaneous BID    lisinopriL  40 mg Oral Daily     Continuous Infusions:  PRN Meds:acetaminophen, albuterol-ipratropium, bisacodyL, dextrose 10%, dextrose 10%, glucagon (human recombinant), glucose, glucose, insulin aspart U-100, melatonin, naloxone, ondansetron, oxyCODONE, oxyCODONE, polyethylene glycol, promethazine, sodium chloride 0.9%    Review of Systems   Constitutional:  Negative for activity change, chills, diaphoresis and fever.   HENT:  Negative for trouble swallowing.    Eyes:  Negative for photophobia and visual disturbance.   Respiratory:  Negative for cough, chest tightness, shortness of breath and wheezing.    Cardiovascular:  Negative for chest pain, palpitations and leg swelling.   Gastrointestinal:  Negative for abdominal pain, constipation, diarrhea, nausea and vomiting.   Genitourinary:  Negative for dysuria, frequency, hematuria and urgency.   Musculoskeletal:  Positive for gait problem. Negative for arthralgias and back pain.   Skin:  Positive for color change and wound. Negative for rash.   Neurological:  Positive for numbness. Negative for  dizziness, syncope, weakness, light-headedness and headaches.   Psychiatric/Behavioral:  Negative for agitation and confusion. The patient is not nervous/anxious.    Objective:     Vital Signs (Most Recent):  Temp: 98.6 °F (37 °C) (11/23/22 1122)  Pulse: (!) 55 (11/23/22 1122)  Resp: 18 (11/23/22 1122)  BP: 127/68 (11/23/22 1122)  SpO2: (!) 94 % (11/23/22 1122)   Vital Signs (24h Range):  Temp:  [97.6 °F (36.4 °C)-98.6 °F (37 °C)] 98.6 °F (37 °C)  Pulse:  [53-61] 55  Resp:  [18-20] 18  SpO2:  [93 %-98 %] 94 %  BP: ()/(51-90) 127/68     Weight: 127 kg (280 lb)  Body mass index is 35.95 kg/m².    Foot Exam    General  Orientation: alert and oriented to person, place, and time       Right Foot/Ankle     Inspection and Palpation  Tenderness: none   Swelling: (3rd toe)  Skin Exam: callus, drainage, dry skin, cellulitis, skin changes, abnormal color, ulcer and erythema; skin not intact     Neurovascular  Dorsalis pedis: 1+  Posterior tibial: doppler  Saphenous nerve sensation: absent  Tibial nerve sensation: absent  Superficial peroneal nerve sensation: absent  Deep peroneal nerve sensation: absent  Sural nerve sensation: absent    Comments  -Partial 5th ray amputated  -3rd toe with lateral wound down to bone, fibronecrotic wound bed, serous drainage, mild malodor. Not tender. Chronic periwound skin thickening.   -2nd metatarsal head plantar wound down to bone, fibrotic wound bed, serosanguinous drainage. Not tender.     Left Foot/Ankle      Inspection and Palpation  Tenderness: none   Swelling: none   Skin Exam: callus and dry skin; no drainage, no cellulitis, no ulcer and no erythema     Neurovascular  Dorsalis pedis: 1+  Posterior tibial: doppler  Saphenous nerve sensation: absent  Tibial nerve sensation: absent  Superficial peroneal nerve sensation: absent  Deep peroneal nerve sensation: absent  Sural nerve sensation: absent    Comments  -Partial 1st ray amputated    Laboratory:  Blood Cultures:   Recent Labs    Lab 11/22/22 1153   LABBLOO No Growth to date  No Growth to date     CBC:   Recent Labs   Lab 11/22/22 1154   WBC 10.62   RBC 4.93   HGB 14.7   HCT 43.2      MCV 88   MCH 29.8   MCHC 34.0     CMP:   Recent Labs   Lab 11/23/22  0253   *   CALCIUM 9.4   ALBUMIN 3.0*   PROT 6.5   *   K 4.4   CO2 23      BUN 19   CREATININE 0.9   ALKPHOS 89   ALT 25   AST 18   BILITOT 0.5     CRP:   Recent Labs   Lab 11/22/22  1154   CRP 15.6*     ESR:   Recent Labs   Lab 11/22/22 1154   SEDRATE 56*     Microbiology Results (last 7 days)       Procedure Component Value Units Date/Time    Blood culture #1 **CANNOT BE ORDERED STAT** [[id number]] Collected: 11/22/22 1153    Order Status: Completed Specimen: Blood from Peripheral, Forearm, Right Updated: 11/22/22 1945     Blood Culture, Routine No Growth to date    Blood culture #2 **CANNOT BE ORDERED STAT** [[id number]] Collected: 11/22/22 1153    Order Status: Completed Specimen: Blood from Peripheral, Forearm, Right Updated: 11/22/22 1945     Blood Culture, Routine No Growth to date          Specimen (24h ago, onward)      None          Assessment/Plan:     * Diabetic foot infection  Assessment: IDSA moderate diabetic foot infection with right 3rd toe osteomyelitis and possible 2nd MTPJ osteomyelitis. X ray unremarkable. MRI + for right 3rd middle and proximal phalanx OM. Bone culture + for Staph aureus and Pasteurella. Pedal pulses diminished with R CODY 1.1 and L VASILIY 1.2.     Plan:  -Planning for surgical management of osteomyelitis with right 3rd toe/ray amputation. No OR availability today, primary team informed. Surgical timing to be determined. Will discuss with holiday staff and OR.   -Antibiotic plan per primary team.   -RLE WB to heel in surgical shoe for transfers or short distances. LLE WBAT.  -Dressing changes by nursing.   -Podiatry will follow.           Josiah Saunders DPM PGY-3  Podiatric Medicine & Surgery  [facility]  Secure Chat  Preferred  Mobile: [phone]  Pager: [phone]

## 2022-11-23 NOTE — PLAN OF CARE
[facility] - Med Surg  Initial Discharge Assessment       Primary Care Provider: BRAD Baker MD    Admission Diagnosis: Right foot pain [M79.671]  Chest pain [R07.9]    Admission Date: 11/22/2022  Expected Discharge Date: 11/26/2022    Discharge Barriers Identified: None    Payor: [employer] / Plan: "[employer] RESOURCES ([employer]) / Product Type: Commercial /     Extended Emergency Contact Information  Primary Emergency Contact: Toña Rivera  Address: [address] [place] [address] [facility]  Home Phone: [phone]  Relation: Spouse    Discharge Plan A: Home Health  Discharge Plan B: Skilled Nursing Facility      [facility] [address] [place] [address] [address] [facility]  [address] [facility] [address]  Phone: [phone] Fax: [phone]    [facility]  [address] [facility] [address]  Phone: [phone] Fax: [phone]    [facility] - [address]  [address] [address]  Phone: [phone] Fax: [phone]    [id number] [place] - [address] [facility]  [address] [facility]  [place] [address]  Phone: [phone] Fax: [phone]      Initial Assessment (most recent)       Adult Discharge Assessment - 11/23/22 1105          Discharge Assessment    Assessment Type Discharge Planning Assessment     Confirmed/corrected address, phone number and insurance Yes     Confirmed Demographics Correct on Facesheet     Source of Information patient     Does patient/caregiver understand observation status Yes     Reason For Admission Diabetic foot infection     Lives With spouse     Facility Arrived From: Home     Do you expect to return to your current living situation? Yes     Do you have help at home or someone to help you manage your care at home? Yes     Who are your caregiver(s) and their phone number(s)? Adriana Rivera      Prior to  hospitilization cognitive status: Alert/Oriented;No Deficits     Current cognitive status: Alert/Oriented;No Deficits     Walking or Climbing Stairs Difficulty none     Dressing/Bathing Difficulty none     Equipment Currently Used at Home none     Readmission within 30 days? No     Patient currently being followed by outpatient case management? No     Do you currently have service(s) that help you manage your care at home? No     Do you take prescription medications? Yes     Do you have prescription coverage? Yes     Do you have any problems affording any of your prescribed medications? No     Is the patient taking medications as prescribed? yes     Who is going to help you get home at discharge? Toña Rivera Spouse     How do you get to doctors appointments? family or friend will provide;car, drives self     Are you on dialysis? No     Do you take coumadin? No     Discharge Plan A Home Health     Discharge Plan B Skilled Nursing Facility     DME Needed Upon Discharge  other (see comments)   TBD by therapy    Discharge Plan discussed with: Patient     Discharge Barriers Identified None        Relationship/Environment    Name(s) of Who Lives With Patient Toña Rivera Spouse

## 2022-11-23 NOTE — ASSESSMENT & PLAN NOTE
Endocrinology consulted for BG management.   BG goal 140-180    - Patient does not have pump supplies and is not a good candidate for pump therapy at this time.     Primary team started the following regimen L 9 QD; N3 TIDWM + SSI    - Levemir (Insulin Detemir) 9 units BID  - Novolog (Insulin Aspart) 6 units TIDWM and prn for BG excursions MDC SSI (125/50)  - BG checks AC/HS  - Hypoglycemia protocol in place  - If blood glucose greater than 300, please ask patient not to eat food or drink anything other than water until correctional insulin has brought it back below 250    ** Please notify Endocrine for any change and/or advance in diet**  ** Please call Endocrine for any BG related issues **    Discharge Planning:   TBD. Please notify endocrinology prior to discharge.  - If discharged today would recommend resuming home insulin pump given BG improvement.

## 2022-11-23 NOTE — SUBJECTIVE & OBJECTIVE
Subjective:     Interval History: Bradycardic, other vitals stable. No new pedal complaints. Dressings clean dry, intact. MRI + for R 3rd toe OM only.     Follow-up For: Procedure(s) (LRB):  AMPUTATION, TOE (Right)    Post-Operative Day: Day of Surgery    Scheduled Meds:   ascorbic acid (vitamin C)  500 mg Oral Daily    aspirin  81 mg Oral Daily    atorvastatin  80 mg Oral QHS    carvediloL  12.5 mg Oral BID    cefTRIAXone (ROCEPHIN) IVPB  2 g Intravenous Q24H    heparin (porcine)  5,000 Units Subcutaneous Q8H    insulin aspart U-100  3-6 Units Subcutaneous TIDWM    insulin detemir U-100  9 Units Subcutaneous BID    lisinopriL  40 mg Oral Daily     Continuous Infusions:  PRN Meds:acetaminophen, albuterol-ipratropium, bisacodyL, dextrose 10%, dextrose 10%, glucagon (human recombinant), glucose, glucose, insulin aspart U-100, melatonin, naloxone, ondansetron, oxyCODONE, oxyCODONE, polyethylene glycol, promethazine, sodium chloride 0.9%    Review of Systems   Constitutional:  Negative for activity change, chills, diaphoresis and fever.   HENT:  Negative for trouble swallowing.    Eyes:  Negative for photophobia and visual disturbance.   Respiratory:  Negative for cough, chest tightness, shortness of breath and wheezing.    Cardiovascular:  Negative for chest pain, palpitations and leg swelling.   Gastrointestinal:  Negative for abdominal pain, constipation, diarrhea, nausea and vomiting.   Genitourinary:  Negative for dysuria, frequency, hematuria and urgency.   Musculoskeletal:  Positive for gait problem. Negative for arthralgias and back pain.   Skin:  Positive for color change and wound. Negative for rash.   Neurological:  Positive for numbness. Negative for dizziness, syncope, weakness, light-headedness and headaches.   Psychiatric/Behavioral:  Negative for agitation and confusion. The patient is not nervous/anxious.    Objective:     Vital Signs (Most Recent):  Temp: 98.6 °F (37 °C) (11/23/22 1122)  Pulse: (!)  55 (11/23/22 1122)  Resp: 18 (11/23/22 1122)  BP: 127/68 (11/23/22 1122)  SpO2: (!) 94 % (11/23/22 1122)   Vital Signs (24h Range):  Temp:  [97.6 °F (36.4 °C)-98.6 °F (37 °C)] 98.6 °F (37 °C)  Pulse:  [53-61] 55  Resp:  [18-20] 18  SpO2:  [93 %-98 %] 94 %  BP: ()/(51-90) 127/68     Weight: 127 kg (280 lb)  Body mass index is 35.95 kg/m².    Foot Exam    General  Orientation: alert and oriented to person, place, and time       Right Foot/Ankle     Inspection and Palpation  Tenderness: none   Swelling: (3rd toe)  Skin Exam: callus, drainage, dry skin, cellulitis, skin changes, abnormal color, ulcer and erythema; skin not intact     Neurovascular  Dorsalis pedis: 1+  Posterior tibial: doppler  Saphenous nerve sensation: absent  Tibial nerve sensation: absent  Superficial peroneal nerve sensation: absent  Deep peroneal nerve sensation: absent  Sural nerve sensation: absent    Comments  -Partial 5th ray amputated  -3rd toe with lateral wound down to bone, fibronecrotic wound bed, serous drainage, mild malodor. Not tender. Chronic periwound skin thickening.   -2nd metatarsal head plantar wound down to bone, fibrotic wound bed, serosanguinous drainage. Not tender.     Left Foot/Ankle      Inspection and Palpation  Tenderness: none   Swelling: none   Skin Exam: callus and dry skin; no drainage, no cellulitis, no ulcer and no erythema     Neurovascular  Dorsalis pedis: 1+  Posterior tibial: doppler  Saphenous nerve sensation: absent  Tibial nerve sensation: absent  Superficial peroneal nerve sensation: absent  Deep peroneal nerve sensation: absent  Sural nerve sensation: absent    Comments  -Partial 1st ray amputated    Laboratory:  Blood Cultures:   Recent Labs   Lab 11/22/22  1153   LABBLOO No Growth to date  No Growth to date     CBC:   Recent Labs   Lab 11/22/22  1154   WBC 10.62   RBC 4.93   HGB 14.7   HCT 43.2      MCV 88   MCH 29.8   MCHC 34.0     CMP:   Recent Labs   Lab 11/23/22  0253   *    CALCIUM 9.4   ALBUMIN 3.0*   PROT 6.5   *   K 4.4   CO2 23      BUN 19   CREATININE 0.9   ALKPHOS 89   ALT 25   AST 18   BILITOT 0.5     CRP:   Recent Labs   Lab 11/22/22 1154   CRP 15.6*     ESR:   Recent Labs   Lab 11/22/22 1154   SEDRATE 56*     Microbiology Results (last 7 days)       Procedure Component Value Units Date/Time    Blood culture #1 **CANNOT BE ORDERED STAT** [[id number]] Collected: 11/22/22 1153    Order Status: Completed Specimen: Blood from Peripheral, Forearm, Right Updated: 11/22/22 1945     Blood Culture, Routine No Growth to date    Blood culture #2 **CANNOT BE ORDERED STAT** [[id number]] Collected: 11/22/22 1153    Order Status: Completed Specimen: Blood from Peripheral, Forearm, Right Updated: 11/22/22 1945     Blood Culture, Routine No Growth to date          Specimen (24h ago, onward)      None

## 2022-11-24 ENCOUNTER — ANESTHESIA EVENT (OUTPATIENT)
Dept: SURGERY | Facility: HOSPITAL | Age: 57
DRG: 617 | End: 2022-11-24
Payer: COMMERCIAL

## 2022-11-24 LAB
ALBUMIN SERPL BCP-MCNC: 2.9 G/DL (ref 3.5–5.2)
ALP SERPL-CCNC: 80 U/L (ref 55–135)
ALT SERPL W/O P-5'-P-CCNC: 21 U/L (ref 10–44)
ANION GAP SERPL CALC-SCNC: 7 MMOL/L (ref 8–16)
AST SERPL-CCNC: 15 U/L (ref 10–40)
BILIRUB SERPL-MCNC: 0.4 MG/DL (ref 0.1–1)
BUN SERPL-MCNC: 22 MG/DL (ref 6–20)
CALCIUM SERPL-MCNC: 8.4 MG/DL (ref 8.7–10.5)
CHLORIDE SERPL-SCNC: 100 MMOL/L (ref 95–110)
CO2 SERPL-SCNC: 24 MMOL/L (ref 23–29)
CREAT SERPL-MCNC: 1 MG/DL (ref 0.5–1.4)
EST. GFR  (NO RACE VARIABLE): >60 ML/MIN/1.73 M^2
GLUCOSE SERPL-MCNC: 248 MG/DL (ref 70–110)
MAGNESIUM SERPL-MCNC: 1.9 MG/DL (ref 1.6–2.6)
PHOSPHATE SERPL-MCNC: 3.3 MG/DL (ref 2.7–4.5)
POCT GLUCOSE: 203 MG/DL (ref 70–110)
POCT GLUCOSE: 247 MG/DL (ref 70–110)
POCT GLUCOSE: 311 MG/DL (ref 70–110)
POCT GLUCOSE: 346 MG/DL (ref 70–110)
POTASSIUM SERPL-SCNC: 4.1 MMOL/L (ref 3.5–5.1)
PROT SERPL-MCNC: 6.3 G/DL (ref 6–8.4)
SODIUM SERPL-SCNC: 131 MMOL/L (ref 136–145)

## 2022-11-24 PROCEDURE — 11000001 HC ACUTE MED/SURG PRIVATE ROOM

## 2022-11-24 PROCEDURE — 63600175 PHARM REV CODE 636 W HCPCS: Performed by: HOSPITALIST

## 2022-11-24 PROCEDURE — 99024 PR POST-OP FOLLOW-UP VISIT: ICD-10-PCS | Mod: ,,, | Performed by: PODIATRIST

## 2022-11-24 PROCEDURE — 25000003 PHARM REV CODE 250

## 2022-11-24 PROCEDURE — 80053 COMPREHEN METABOLIC PANEL: CPT

## 2022-11-24 PROCEDURE — 99232 PR SUBSEQUENT HOSPITAL CARE,LEVL II: ICD-10-PCS | Mod: ,,, | Performed by: STUDENT IN AN ORGANIZED HEALTH CARE EDUCATION/TRAINING PROGRAM

## 2022-11-24 PROCEDURE — 99024 POSTOP FOLLOW-UP VISIT: CPT | Mod: ,,, | Performed by: PODIATRIST

## 2022-11-24 PROCEDURE — 94761 N-INVAS EAR/PLS OXIMETRY MLT: CPT

## 2022-11-24 PROCEDURE — 25000003 PHARM REV CODE 250: Performed by: HOSPITALIST

## 2022-11-24 PROCEDURE — 99232 SBSQ HOSP IP/OBS MODERATE 35: CPT | Mod: ,,, | Performed by: STUDENT IN AN ORGANIZED HEALTH CARE EDUCATION/TRAINING PROGRAM

## 2022-11-24 PROCEDURE — 83735 ASSAY OF MAGNESIUM: CPT

## 2022-11-24 PROCEDURE — 84100 ASSAY OF PHOSPHORUS: CPT

## 2022-11-24 PROCEDURE — 36415 COLL VENOUS BLD VENIPUNCTURE: CPT

## 2022-11-24 RX ADMIN — INSULIN ASPART 2 UNITS: 100 INJECTION, SOLUTION INTRAVENOUS; SUBCUTANEOUS at 05:11

## 2022-11-24 RX ADMIN — INSULIN ASPART 6 UNITS: 100 INJECTION, SOLUTION INTRAVENOUS; SUBCUTANEOUS at 11:11

## 2022-11-24 RX ADMIN — INSULIN ASPART 4 UNITS: 100 INJECTION, SOLUTION INTRAVENOUS; SUBCUTANEOUS at 11:11

## 2022-11-24 RX ADMIN — INSULIN DETEMIR 9 UNITS: 100 INJECTION, SOLUTION SUBCUTANEOUS at 09:11

## 2022-11-24 RX ADMIN — CARVEDILOL 12.5 MG: 12.5 TABLET, FILM COATED ORAL at 09:11

## 2022-11-24 RX ADMIN — INSULIN ASPART 6 UNITS: 100 INJECTION, SOLUTION INTRAVENOUS; SUBCUTANEOUS at 05:11

## 2022-11-24 RX ADMIN — ATORVASTATIN CALCIUM 80 MG: 40 TABLET, FILM COATED ORAL at 09:11

## 2022-11-24 RX ADMIN — INSULIN ASPART 2 UNITS: 100 INJECTION, SOLUTION INTRAVENOUS; SUBCUTANEOUS at 09:11

## 2022-11-24 RX ADMIN — INSULIN ASPART 6 UNITS: 100 INJECTION, SOLUTION INTRAVENOUS; SUBCUTANEOUS at 09:11

## 2022-11-24 RX ADMIN — LISINOPRIL 40 MG: 20 TABLET ORAL at 09:11

## 2022-11-24 RX ADMIN — ASPIRIN 81 MG: 81 TABLET, COATED ORAL at 09:11

## 2022-11-24 RX ADMIN — OXYCODONE HYDROCHLORIDE AND ACETAMINOPHEN 500 MG: 500 TABLET ORAL at 09:11

## 2022-11-24 RX ADMIN — CEFTRIAXONE 2 G: 2 INJECTION, POWDER, FOR SOLUTION INTRAMUSCULAR; INTRAVENOUS at 11:11

## 2022-11-24 NOTE — SUBJECTIVE & OBJECTIVE
Interval History:   No acute events overnight. Patient pending OR availability for amputation. Will not happen today, will make NPO @ [facility] in preparation for tomorrow.     Review of Systems   Constitutional:  Negative for activity change, chills, diaphoresis and fever.   HENT:  Negative for trouble swallowing.    Eyes:  Negative for photophobia and visual disturbance.   Respiratory:  Negative for cough, chest tightness, shortness of breath and wheezing.    Cardiovascular:  Negative for chest pain, palpitations and leg swelling.   Gastrointestinal:  Negative for abdominal pain, constipation, diarrhea, nausea and vomiting.   Genitourinary:  Negative for dysuria, frequency, hematuria and urgency.   Musculoskeletal:  Negative for arthralgias, back pain and gait problem.   Skin:  Positive for color change (right 3rd toe) and wound (right 3rd toe). Negative for rash.   Neurological:  Negative for dizziness, syncope, weakness, light-headedness, numbness and headaches.   Psychiatric/Behavioral:  Negative for agitation and confusion. The patient is not nervous/anxious.    Objective:     Vital Signs (Most Recent):  Temp: 98.2 °F (36.8 °C) (11/24/22 1115)  Pulse: (!) 54 (11/24/22 1137)  Resp: 18 (11/24/22 1115)  BP: 129/66 (11/24/22 1115)  SpO2: 97 % (11/24/22 1115)   Vital Signs (24h Range):  Temp:  [97.6 °F (36.4 °C)-98.6 °F (37 °C)] 98.2 °F (36.8 °C)  Pulse:  [51-69] 54  Resp:  [18] 18  SpO2:  [94 %-97 %] 97 %  BP: ()/(54-74) 129/66     Weight: 127 kg (280 lb)  Body mass index is 35.95 kg/m².  No intake or output data in the 24 hours ending 11/24/22 1231     Physical Exam  Vitals and nursing note reviewed.   Constitutional:       General: He is not in acute distress.     Appearance: He is well-developed. He is obese. He is not toxic-appearing or diaphoretic.   HENT:      Head: Normocephalic and atraumatic.      Mouth/Throat:      Mouth: Mucous membranes are moist.      Pharynx: No oropharyngeal exudate.   Eyes:       Conjunctiva/sclera: Conjunctivae normal.   Cardiovascular:      Rate and Rhythm: Normal rate and regular rhythm.      Heart sounds: Normal heart sounds.   Pulmonary:      Effort: Pulmonary effort is normal. No respiratory distress.      Breath sounds: Wheezing (end expiratory) present.   Abdominal:      General: Bowel sounds are normal. There is no distension.      Palpations: Abdomen is soft.      Tenderness: There is no abdominal tenderness.   Musculoskeletal:      Cervical back: Normal range of motion and neck supple.      Right lower leg: Edema (pitting tibial) present.      Left lower leg: Edema (pitting tibial) present.      Comments: Unable to assess ROM, swelling, tenderness or infection site as patient's toe was wrapped in dressing. Dressing clean, dry and intact.   Lymphadenopathy:      Cervical: No cervical adenopathy.   Skin:     General: Skin is warm and dry.      Capillary Refill: Capillary refill takes less than 2 seconds.      Findings: Erythema (see comment) present. No rash.      Comments: During exam patient's toe was dressed. Dressing clean, dry and intact. Per pictures in patient chart - right 3rd toe with ulcer on lateral aspect, purulent drainage. Additional ulcer on ventral aspect of foot.   Neurological:      Mental Status: He is alert and oriented to person, place, and time.      Cranial Nerves: No cranial nerve deficit.      Sensory: No sensory deficit.      Coordination: Coordination normal.   Psychiatric:         Behavior: Behavior normal.         Thought Content: Thought content normal.         Judgment: Judgment normal.       Significant Labs: All pertinent labs within the past 24 hours have been reviewed.  CBC: No results for input(s): WBC, HGB, HCT, PLT in the last 48 hours.  CMP:   Recent Labs   Lab 11/23/22  0253 11/24/22  0320   * 131*   K 4.4 4.1    100   CO2 23 24   * 248*   BUN 19 22*   CREATININE 0.9 1.0   CALCIUM 9.4 8.4*   PROT 6.5 6.3   ALBUMIN 3.0* 2.9*    BILITOT 0.5 0.4   ALKPHOS 89 80   AST 18 15   ALT 25 21   ANIONGAP 9 7*       Significant Imaging: I have reviewed all pertinent imaging results/findings within the past 24 hours.

## 2022-11-24 NOTE — PROGRESS NOTES
Subjective:      Patient ID: Billy Rivera is a 57 y.o. male.    Chief Complaint: Wound Care (Right foot wound )    Billy is a 57 y.o. male who presents to the clinic for evaluation and treatment of high risk feet. Billy has a past medical history of Allergy, CAD (coronary artery disease), native coronary artery (6/25/2013), Cancer, COVID-19 virus detected (9/1/2020), Diabetes mellitus, Diabetes mellitus, type 2, Disorder of kidney and ureter, Heart attack (04/2012), colon cancer, stage I, Hyperlipidemia, Hypertension, Muscular pain, NSTEMI May 2013 - peak troponin 0.22 (5/22/2013), MICHAELA (obstructive sleep apnea), and Retinopathy due to secondary diabetes. The patient's chief complaint is foot ulcer both feet.   The right foot has worsened.  He has noticed increased redness, swelling and malodor to the right 3rd digit.  PCP: BRAD Baker MD    Date Last Seen by PCP: 3/23/2022    Current shoe gear:  Affected Foot: DARCO Shoe right foot     Affected Foot: Tennis shoe left foot    History of Trauma: negative  Sign of Infection: none    Hemoglobin A1C   Date Value Ref Range Status   11/10/2022 7.1 (H) 4.0 - 5.6 % Final     Comment:     ADA Screening Guidelines:  5.7-6.4%  Consistent with prediabetes  >or=6.5%  Consistent with diabetes    High levels of fetal hemoglobin interfere with the HbA1C  assay. Heterozygous hemoglobin variants (HbS, HgC, etc)do  not significantly interfere with this assay.   However, presence of multiple variants may affect accuracy.     08/09/2022 8.0 (H) 4.0 - 5.6 % Final     Comment:     ADA Screening Guidelines:  5.7-6.4%  Consistent with prediabetes  >or=6.5%  Consistent with diabetes    High levels of fetal hemoglobin interfere with the HbA1C  assay. Heterozygous hemoglobin variants (HbS, HgC, etc)do  not significantly interfere with this assay.   However, presence of multiple variants may affect accuracy.     06/08/2022 10.0 (H) 4.0 - 5.6 % Final     Comment:     ADA  Screening Guidelines:  5.7-6.4%  Consistent with prediabetes  >or=6.5%  Consistent with diabetes    High levels of fetal hemoglobin interfere with the HbA1C  assay. Heterozygous hemoglobin variants (HbS, HgC, etc)do  not significantly interfere with this assay.   However, presence of multiple variants may affect accuracy.         Review of Systems   Constitutional: Negative for chills, decreased appetite, fever, malaise/fatigue and night sweats.   HENT:  Negative for congestion, hearing loss, nosebleeds and tinnitus.    Eyes:  Negative for double vision, pain, photophobia and visual disturbance.   Cardiovascular:  Negative for chest pain, claudication, cyanosis and leg swelling.   Respiratory:  Negative for cough, hemoptysis, shortness of breath and wheezing.    Endocrine: Negative for cold intolerance and heat intolerance.   Hematologic/Lymphatic: Negative for adenopathy and bleeding problem.   Skin:  Positive for poor wound healing. Negative for color change, dry skin, itching, nail changes, rash and suspicious lesions.   Musculoskeletal:  Negative for arthritis, falls, gout, joint pain, myalgias and stiffness.   Gastrointestinal:  Negative for abdominal pain, jaundice, nausea and vomiting.   Genitourinary:  Negative for dysuria, frequency and hematuria.   Neurological:  Positive for numbness and sensory change. Negative for difficulty with concentration, loss of balance and paresthesias.   Psychiatric/Behavioral:  Negative for altered mental status, hallucinations and suicidal ideas. The patient is not nervous/anxious.    Allergic/Immunologic: Negative for environmental allergies and persistent infections.         Objective:        Physical Exam  Constitutional:       General: He is not in acute distress.     Appearance: Normal appearance.   Cardiovascular:      Pulses:           Dorsalis pedis pulses are 2+ on the right side and 2+ on the left side.        Posterior tibial pulses are 2+ on the right side and 2+  on the left side.      Comments: CFT< 3 secs all toes bilateral foot, skin temp warm to cool bilateral foot, no hair growth bilateral lower extremity, no edema to bilateral lower extremities    Musculoskeletal:         General: No swelling.      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     5/5 muscle strength with DF/PF/Inv/Ev bilateral.    Inspection and palpation of the joints and bones reveal no crepitus or joint effusion. No tenderness upon palpation.  Angle and base of gait are normal.    Skin:     General: Skin is warm and dry.      Capillary Refill: Capillary refill takes 2 to 3 seconds.      Findings: No erythema.      Comments: Ulceration location:  Ulceration right 3rd digit has increased in size.  Increased edema and erythema with malodor apparent.  Full-thickness ulceration to the level of muscle and bone.   Neurological:      Mental Status: He is alert and oriented to person, place, and time.      Sensory: Sensory deficit present.      Motor: No weakness.      Comments: Diminished/loss of protective sensation all toes bilateral to 10 gram monofilament.   Psychiatric:         Mood and Affect: Mood normal.         Thought Content: Thought content normal.             Assessment:       Encounter Diagnoses   Name Primary?    Foot ulcer, right, with fat layer exposed Yes    Osteomyelitis, unspecified site, unspecified type     Type II diabetes mellitus with neurological manifestations            Plan:       Billy was seen today for wound care.    Diagnoses and all orders for this visit:    Foot ulcer, right, with fat layer exposed    Osteomyelitis, unspecified site, unspecified type    Type II diabetes mellitus with neurological manifestations      I counseled the patient on his conditions, their implications and medical management.    Recommend hospitalization, sent to emergency department.  Biopsy taken last visit showed signs of osteomyelitis, cultures were  performed as well previous visit.  Patient recommended to be admitted for IV antibiotics, vascular workup as well as MRI and incision and drainage with probable amputation/possible transmetatarsal amputation.

## 2022-11-24 NOTE — NURSING
IV line infiltrated  Required to be removed  IV line removed  Cath intact  Patient tolerated well

## 2022-11-24 NOTE — CARE UPDATE
-Glucose Goal 140-180    -A1C:   Hemoglobin A1C   Date Value Ref Range Status   11/10/2022 7.1 (H) 4.0 - 5.6 % Final     Comment:     ADA Screening Guidelines:  5.7-6.4%  Consistent with prediabetes  >or=6.5%  Consistent with diabetes    High levels of fetal hemoglobin interfere with the HbA1C  assay. Heterozygous hemoglobin variants (HbS, HgC, etc)do  not significantly interfere with this assay.   However, presence of multiple variants may affect accuracy.           -HOME REGIMEN: Metformin 1000 mg BID; Farxiga 5 mg and Trulicity 1.5 mg weekly.   (Pump not available to review settings at time of consult; settings taken from previous note).   Medtronic 770G    -INPATIENT REGIMEN: levemir 9 units BID and novolog 3-6 units with meals.     -GLUCOSE TREND FOR THE PAST 24HRS:   Recent Labs   Lab 11/22/22  1551 11/23/22  0851 11/23/22  1120 11/23/22  1547 11/23/22 2004   POCTGLUCOSE 105 151* 135* 327* 188*         -NO HYPOGYCEMIAS NOTED     - Diet  Diet NPO      Plan:   - -Continue Levemir 9 units BID, -Continue Novolog 3-6 units with meals when diet advanced, and -Continue Novolog correction dose with ISF 50 starting at 201    - POCT Glucose before meals and at bedtime  - Hypoglycemia protocol in place      ** Please notify Endocrine for any change and/or advance in diet**  ** Please call Endocrine for any BG related issues **     Discharge Planning:   TBD. Please notify endocrinology prior to discharge.

## 2022-11-24 NOTE — ASSESSMENT & PLAN NOTE
Right foot pain  Pt with 2 previous toe amputations for similar infections - left great toe and right 5th toe - most recent 06/2022. Previous cultures 06/2022 significant for strep agalactiae (GBS) and prevotella bivia. Cultures by podiatry clinic 11/15/2022 significant for staph aureus and pasturella multocida. ESR & CRP elevated. WBC without leukocytosis  - current blood cultures NGTD  - will repeat lactic and follow results  - podiatry consulted, will follow recs  - Tentative plan for amputation 3rd toe right  - rocephin should cover the staph and pasturella from 11/15 bone biopsy  - keep foot elevated and dry  - pt currently pain free, pain medication prn if needed   - hold home diabetic medications

## 2022-11-24 NOTE — PROGRESS NOTES
[facility]  Progress Note    Patient Name: Billy Rivera  MRN: [id number]  Patient Class: IP- Inpatient   Admission Date: 11/22/2022  Length of Stay: 2 days  Attending Physician: Terrance Sierra MD  Primary Care Provider: BRAD Baker MD        Subjective:     Principal Problem:Diabetic foot infection        HPI:  Billy Rivera is a 57 y.o. male with a history of type 2 diabetes, CAD, HTN, and colon cancer admitted to [facility] with diabetic foot infection to his right 3rd toe. He was referred from his podiatrist, Dr. Melara, due to a right 3rd toe ulcer with infection that will likely require amputation. Endorses that toe is malodorous and has some purulent drainage. Endorses associated peripheral neuropathy. Denies associated toe pain, ankle or leg pain. He has had two previous amputations (left great toe and right 5th toe) due to similar conditions. Per his podiatrist, he was taking Doxy and Cipro since 11/15/2022 for the current right 3rd toe infection and per patient he has been started on IV abx upon arrival to hospital. He reports that his toe infections began around 3 years ago after stepping on piece of glass barefoot. Denies associated headache, fever, chills, CP, SOB, N/V/D, abdominal pain, urinary symptoms, numbness or tingling.       ED: VSS, AF. WBC 10, lactic acid elevated to 2.3. ESR & CRP mildly elevated, other labs unremarkable. Blood cultures pending. Right foot XR showing DJD without acute fracture or bone destruction. Given 1 L IVF and started on IV Vanc and Cefepime.      Overview/Hospital Course:  Unable to go for amputation today. Pending OR availability      Interval History:   No acute events overnight. Patient pending OR availability for amputation. Will not happen today, will make NPO @ [facility] in preparation for tomorrow.     Review of Systems   Constitutional:  Negative for activity change, chills, diaphoresis and fever.   HENT:  Negative for trouble  swallowing.    Eyes:  Negative for photophobia and visual disturbance.   Respiratory:  Negative for cough, chest tightness, shortness of breath and wheezing.    Cardiovascular:  Negative for chest pain, palpitations and leg swelling.   Gastrointestinal:  Negative for abdominal pain, constipation, diarrhea, nausea and vomiting.   Genitourinary:  Negative for dysuria, frequency, hematuria and urgency.   Musculoskeletal:  Negative for arthralgias, back pain and gait problem.   Skin:  Positive for color change (right 3rd toe) and wound (right 3rd toe). Negative for rash.   Neurological:  Negative for dizziness, syncope, weakness, light-headedness, numbness and headaches.   Psychiatric/Behavioral:  Negative for agitation and confusion. The patient is not nervous/anxious.    Objective:     Vital Signs (Most Recent):  Temp: 98.2 °F (36.8 °C) (11/24/22 1115)  Pulse: (!) 54 (11/24/22 1137)  Resp: 18 (11/24/22 1115)  BP: 129/66 (11/24/22 1115)  SpO2: 97 % (11/24/22 1115)   Vital Signs (24h Range):  Temp:  [97.6 °F (36.4 °C)-98.6 °F (37 °C)] 98.2 °F (36.8 °C)  Pulse:  [51-69] 54  Resp:  [18] 18  SpO2:  [94 %-97 %] 97 %  BP: ()/(54-74) 129/66     Weight: 127 kg (280 lb)  Body mass index is 35.95 kg/m².  No intake or output data in the 24 hours ending 11/24/22 1231     Physical Exam  Vitals and nursing note reviewed.   Constitutional:       General: He is not in acute distress.     Appearance: He is well-developed. He is obese. He is not toxic-appearing or diaphoretic.   HENT:      Head: Normocephalic and atraumatic.      Mouth/Throat:      Mouth: Mucous membranes are moist.      Pharynx: No oropharyngeal exudate.   Eyes:      Conjunctiva/sclera: Conjunctivae normal.   Cardiovascular:      Rate and Rhythm: Normal rate and regular rhythm.      Heart sounds: Normal heart sounds.   Pulmonary:      Effort: Pulmonary effort is normal. No respiratory distress.      Breath sounds: Wheezing (end expiratory) present.   Abdominal:       General: Bowel sounds are normal. There is no distension.      Palpations: Abdomen is soft.      Tenderness: There is no abdominal tenderness.   Musculoskeletal:      Cervical back: Normal range of motion and neck supple.      Right lower leg: Edema (pitting tibial) present.      Left lower leg: Edema (pitting tibial) present.      Comments: Unable to assess ROM, swelling, tenderness or infection site as patient's toe was wrapped in dressing. Dressing clean, dry and intact.   Lymphadenopathy:      Cervical: No cervical adenopathy.   Skin:     General: Skin is warm and dry.      Capillary Refill: Capillary refill takes less than 2 seconds.      Findings: Erythema (see comment) present. No rash.      Comments: During exam patient's toe was dressed. Dressing clean, dry and intact. Per pictures in patient chart - right 3rd toe with ulcer on lateral aspect, purulent drainage. Additional ulcer on ventral aspect of foot.   Neurological:      Mental Status: He is alert and oriented to person, place, and time.      Cranial Nerves: No cranial nerve deficit.      Sensory: No sensory deficit.      Coordination: Coordination normal.   Psychiatric:         Behavior: Behavior normal.         Thought Content: Thought content normal.         Judgment: Judgment normal.       Significant Labs: All pertinent labs within the past 24 hours have been reviewed.  CBC: No results for input(s): WBC, HGB, HCT, PLT in the last 48 hours.  CMP:   Recent Labs   Lab 11/23/22  0253 11/24/22  0320   * 131*   K 4.4 4.1    100   CO2 23 24   * 248*   BUN 19 22*   CREATININE 0.9 1.0   CALCIUM 9.4 8.4*   PROT 6.5 6.3   ALBUMIN 3.0* 2.9*   BILITOT 0.5 0.4   ALKPHOS 89 80   AST 18 15   ALT 25 21   ANIONGAP 9 7*       Significant Imaging: I have reviewed all pertinent imaging results/findings within the past 24 hours.      Assessment/Plan:      * Diabetic foot infection  Right foot pain  Pt with 2 previous toe amputations for similar  infections - left great toe and right 5th toe - most recent 06/2022. Previous cultures 06/2022 significant for strep agalactiae (GBS) and prevotella bivia. Cultures by podiatry clinic 11/15/2022 significant for staph aureus and pasturella multocida. ESR & CRP elevated. WBC without leukocytosis  - current blood cultures NGTD  - will repeat lactic and follow results  - podiatry consulted, will follow recs  - Tentative plan for amputation 3rd toe right  - rocephin should cover the staph and pasturella from 11/15 bone biopsy  - keep foot elevated and dry  - pt currently pain free, pain medication prn if needed   - hold home diabetic medications    Right foot pain  See diabetic foot infection      CAD (coronary artery disease)  - continue HTN management   - ekg prn for chest pain    Diabetic foot ulcer associated with type 2 diabetes mellitus  Patient's FSGs are controlled on current medication regimen.  Last A1c reviewed-   Lab Results   Component Value Date    HGBA1C 7.1 (H) 11/10/2022     Most recent fingerstick glucose reviewed- No results for input(s): POCTGLUCOSE in the last 24 hours.  Current correctional scale  Low  Maintain anti-hyperglycemic dose as follows-     Hold Oral hypoglycemics while patient is in the hospital.  Antihyperglycemics (From admission, onward)    Start     Stop Route Frequency Ordered    11/22/22 1645  insulin aspart U-100 pen 3 Units         -- SubQ 3 times daily with meals 11/22/22 1422 11/22/22 1530  insulin detemir U-100 pen 9 Units         -- SubQ Daily 11/22/22 1422        Hold Oral hypoglycemics while patient is in the hospital.    Hypertension associated with diabetes  - continue coreg 12.5 mg BID  - continue lisinopril 40 mg daily      VTE Risk Mitigation (From admission, onward)         Ordered     heparin (porcine) injection 5,000 Units  Every 8 hours         11/22/22 1422     IP VTE HIGH RISK PATIENT  Once         11/22/22 1422     Place sequential compression device  Until  discontinued         11/22/22 1422                Discharge Planning   DARVIN: 11/26/2022     Code Status: Full Code   Is the patient medically ready for discharge?:     Reason for patient still in hospital (select all that apply): Patient trending condition  Discharge Plan A: Home Health                  Terrance Sierra MD  Department of Hospital Medicine   [facility]

## 2022-11-24 NOTE — PLAN OF CARE
Patient AAOX4  Able to make needs known  Patient required more education on diabetic diet  Bed in lowest position and locked  Call bell and personal items within reach.

## 2022-11-25 ENCOUNTER — ANESTHESIA (OUTPATIENT)
Dept: SURGERY | Facility: HOSPITAL | Age: 57
DRG: 617 | End: 2022-11-25
Payer: COMMERCIAL

## 2022-11-25 PROBLEM — L97.514: Status: ACTIVE | Noted: 2022-11-25

## 2022-11-25 LAB
ALBUMIN SERPL BCP-MCNC: 3 G/DL (ref 3.5–5.2)
ALP SERPL-CCNC: 86 U/L (ref 55–135)
ALT SERPL W/O P-5'-P-CCNC: 25 U/L (ref 10–44)
ANION GAP SERPL CALC-SCNC: 8 MMOL/L (ref 8–16)
AST SERPL-CCNC: 16 U/L (ref 10–40)
BILIRUB SERPL-MCNC: 0.3 MG/DL (ref 0.1–1)
BUN SERPL-MCNC: 21 MG/DL (ref 6–20)
CALCIUM SERPL-MCNC: 8.6 MG/DL (ref 8.7–10.5)
CHLORIDE SERPL-SCNC: 100 MMOL/L (ref 95–110)
CO2 SERPL-SCNC: 22 MMOL/L (ref 23–29)
CREAT SERPL-MCNC: 0.9 MG/DL (ref 0.5–1.4)
EST. GFR  (NO RACE VARIABLE): >60 ML/MIN/1.73 M^2
FINAL PATHOLOGIC DIAGNOSIS: NORMAL
GLUCOSE SERPL-MCNC: 331 MG/DL (ref 70–110)
GRAM STN SPEC: NORMAL
GRAM STN SPEC: NORMAL
GROSS: NORMAL
Lab: NORMAL
MAGNESIUM SERPL-MCNC: 1.8 MG/DL (ref 1.6–2.6)
PHOSPHATE SERPL-MCNC: 2.7 MG/DL (ref 2.7–4.5)
POCT GLUCOSE: 152 MG/DL (ref 70–110)
POCT GLUCOSE: 164 MG/DL (ref 70–110)
POCT GLUCOSE: 270 MG/DL (ref 70–110)
POCT GLUCOSE: 340 MG/DL (ref 70–110)
POCT GLUCOSE: 379 MG/DL (ref 70–110)
POTASSIUM SERPL-SCNC: 4.4 MMOL/L (ref 3.5–5.1)
PROT SERPL-MCNC: 6.5 G/DL (ref 6–8.4)
SODIUM SERPL-SCNC: 130 MMOL/L (ref 136–145)

## 2022-11-25 PROCEDURE — 88311 DECALCIFY TISSUE: CPT | Performed by: STUDENT IN AN ORGANIZED HEALTH CARE EDUCATION/TRAINING PROGRAM

## 2022-11-25 PROCEDURE — 80053 COMPREHEN METABOLIC PANEL: CPT

## 2022-11-25 PROCEDURE — 63600175 PHARM REV CODE 636 W HCPCS: Performed by: NURSE ANESTHETIST, CERTIFIED REGISTERED

## 2022-11-25 PROCEDURE — 88311 DECALCIFY TISSUE: CPT | Mod: 26,59,, | Performed by: STUDENT IN AN ORGANIZED HEALTH CARE EDUCATION/TRAINING PROGRAM

## 2022-11-25 PROCEDURE — 88311 DECALCIFY TISSUE: CPT | Mod: 59 | Performed by: STUDENT IN AN ORGANIZED HEALTH CARE EDUCATION/TRAINING PROGRAM

## 2022-11-25 PROCEDURE — 88311 PR  DECALCIFY TISSUE: ICD-10-PCS | Mod: 26,,, | Performed by: STUDENT IN AN ORGANIZED HEALTH CARE EDUCATION/TRAINING PROGRAM

## 2022-11-25 PROCEDURE — 36415 COLL VENOUS BLD VENIPUNCTURE: CPT

## 2022-11-25 PROCEDURE — 88305 TISSUE EXAM BY PATHOLOGIST: CPT | Mod: 26,59,, | Performed by: STUDENT IN AN ORGANIZED HEALTH CARE EDUCATION/TRAINING PROGRAM

## 2022-11-25 PROCEDURE — 87015 SPECIMEN INFECT AGNT CONCNTJ: CPT | Performed by: PODIATRIST

## 2022-11-25 PROCEDURE — 88305 TISSUE EXAM BY PATHOLOGIST: ICD-10-PCS | Mod: 26,59,, | Performed by: STUDENT IN AN ORGANIZED HEALTH CARE EDUCATION/TRAINING PROGRAM

## 2022-11-25 PROCEDURE — 88305 TISSUE EXAM BY PATHOLOGIST: CPT | Mod: 26,,, | Performed by: STUDENT IN AN ORGANIZED HEALTH CARE EDUCATION/TRAINING PROGRAM

## 2022-11-25 PROCEDURE — 25000003 PHARM REV CODE 250: Performed by: HOSPITALIST

## 2022-11-25 PROCEDURE — 25000003 PHARM REV CODE 250

## 2022-11-25 PROCEDURE — 25000003 PHARM REV CODE 250: Performed by: NURSE ANESTHETIST, CERTIFIED REGISTERED

## 2022-11-25 PROCEDURE — D9220A PRA ANESTHESIA: ICD-10-PCS | Mod: CRNA,,, | Performed by: NURSE ANESTHETIST, CERTIFIED REGISTERED

## 2022-11-25 PROCEDURE — 99233 PR SUBSEQUENT HOSPITAL CARE,LEVL III: ICD-10-PCS | Mod: ,,, | Performed by: STUDENT IN AN ORGANIZED HEALTH CARE EDUCATION/TRAINING PROGRAM

## 2022-11-25 PROCEDURE — 94761 N-INVAS EAR/PLS OXIMETRY MLT: CPT

## 2022-11-25 PROCEDURE — 87102 FUNGUS ISOLATION CULTURE: CPT | Performed by: PODIATRIST

## 2022-11-25 PROCEDURE — 63600175 PHARM REV CODE 636 W HCPCS: Performed by: HOSPITALIST

## 2022-11-25 PROCEDURE — 71000016 HC POSTOP RECOV ADDL HR: Performed by: PODIATRIST

## 2022-11-25 PROCEDURE — 87075 CULTR BACTERIA EXCEPT BLOOD: CPT | Performed by: PODIATRIST

## 2022-11-25 PROCEDURE — 84100 ASSAY OF PHOSPHORUS: CPT

## 2022-11-25 PROCEDURE — 36000706: Performed by: PODIATRIST

## 2022-11-25 PROCEDURE — 87116 MYCOBACTERIA CULTURE: CPT | Performed by: PODIATRIST

## 2022-11-25 PROCEDURE — 37000009 HC ANESTHESIA EA ADD 15 MINS: Performed by: PODIATRIST

## 2022-11-25 PROCEDURE — 88311 DECALCIFY TISSUE: CPT | Mod: 26,,, | Performed by: STUDENT IN AN ORGANIZED HEALTH CARE EDUCATION/TRAINING PROGRAM

## 2022-11-25 PROCEDURE — 99233 SBSQ HOSP IP/OBS HIGH 50: CPT | Mod: ,,, | Performed by: STUDENT IN AN ORGANIZED HEALTH CARE EDUCATION/TRAINING PROGRAM

## 2022-11-25 PROCEDURE — 83735 ASSAY OF MAGNESIUM: CPT

## 2022-11-25 PROCEDURE — 28820 AMPUTATION OF TOE: CPT | Mod: T7,,, | Performed by: PODIATRIST

## 2022-11-25 PROCEDURE — 88305 TISSUE EXAM BY PATHOLOGIST: CPT | Mod: 59 | Performed by: STUDENT IN AN ORGANIZED HEALTH CARE EDUCATION/TRAINING PROGRAM

## 2022-11-25 PROCEDURE — 99232 SBSQ HOSP IP/OBS MODERATE 35: CPT | Mod: ,,, | Performed by: NURSE PRACTITIONER

## 2022-11-25 PROCEDURE — 27201423 OPTIME MED/SURG SUP & DEVICES STERILE SUPPLY: Performed by: PODIATRIST

## 2022-11-25 PROCEDURE — 82962 GLUCOSE BLOOD TEST: CPT | Performed by: PODIATRIST

## 2022-11-25 PROCEDURE — 28820 PR AMPUTATION TOE,MT-P JT: ICD-10-PCS | Mod: T7,,, | Performed by: PODIATRIST

## 2022-11-25 PROCEDURE — 25000003 PHARM REV CODE 250: Performed by: PODIATRIST

## 2022-11-25 PROCEDURE — 87206 SMEAR FLUORESCENT/ACID STAI: CPT | Performed by: PODIATRIST

## 2022-11-25 PROCEDURE — 88311 PR  DECALCIFY TISSUE: ICD-10-PCS | Mod: 26,59,, | Performed by: STUDENT IN AN ORGANIZED HEALTH CARE EDUCATION/TRAINING PROGRAM

## 2022-11-25 PROCEDURE — 71000015 HC POSTOP RECOV 1ST HR: Performed by: PODIATRIST

## 2022-11-25 PROCEDURE — D9220A PRA ANESTHESIA: ICD-10-PCS | Mod: ANES,,, | Performed by: ANESTHESIOLOGY

## 2022-11-25 PROCEDURE — 87070 CULTURE OTHR SPECIMN AEROBIC: CPT | Performed by: PODIATRIST

## 2022-11-25 PROCEDURE — 87205 SMEAR GRAM STAIN: CPT | Performed by: PODIATRIST

## 2022-11-25 PROCEDURE — 88305 TISSUE EXAM BY PATHOLOGIST: CPT | Performed by: STUDENT IN AN ORGANIZED HEALTH CARE EDUCATION/TRAINING PROGRAM

## 2022-11-25 PROCEDURE — 36000707: Performed by: PODIATRIST

## 2022-11-25 PROCEDURE — 11000001 HC ACUTE MED/SURG PRIVATE ROOM

## 2022-11-25 PROCEDURE — D9220A PRA ANESTHESIA: Mod: ANES,,, | Performed by: ANESTHESIOLOGY

## 2022-11-25 PROCEDURE — 37000008 HC ANESTHESIA 1ST 15 MINUTES: Performed by: PODIATRIST

## 2022-11-25 PROCEDURE — D9220A PRA ANESTHESIA: Mod: CRNA,,, | Performed by: NURSE ANESTHETIST, CERTIFIED REGISTERED

## 2022-11-25 PROCEDURE — 71000033 HC RECOVERY, INTIAL HOUR: Performed by: PODIATRIST

## 2022-11-25 PROCEDURE — 99232 PR SUBSEQUENT HOSPITAL CARE,LEVL II: ICD-10-PCS | Mod: ,,, | Performed by: NURSE PRACTITIONER

## 2022-11-25 PROCEDURE — 88305 TISSUE EXAM BY PATHOLOGIST: ICD-10-PCS | Mod: 26,,, | Performed by: STUDENT IN AN ORGANIZED HEALTH CARE EDUCATION/TRAINING PROGRAM

## 2022-11-25 RX ORDER — INSULIN ASPART 100 [IU]/ML
10 INJECTION, SOLUTION INTRAVENOUS; SUBCUTANEOUS
Status: DISCONTINUED | OUTPATIENT
Start: 2022-11-25 | End: 2022-11-27

## 2022-11-25 RX ORDER — HYDROMORPHONE HYDROCHLORIDE 1 MG/ML
0.2 INJECTION, SOLUTION INTRAMUSCULAR; INTRAVENOUS; SUBCUTANEOUS EVERY 10 MIN PRN
Status: DISCONTINUED | OUTPATIENT
Start: 2022-11-25 | End: 2022-11-28 | Stop reason: HOSPADM

## 2022-11-25 RX ORDER — LIDOCAINE HYDROCHLORIDE 10 MG/ML
INJECTION, SOLUTION EPIDURAL; INFILTRATION; INTRACAUDAL; PERINEURAL
Status: DISCONTINUED | OUTPATIENT
Start: 2022-11-25 | End: 2022-11-25 | Stop reason: HOSPADM

## 2022-11-25 RX ORDER — PROPOFOL 10 MG/ML
VIAL (ML) INTRAVENOUS CONTINUOUS PRN
Status: DISCONTINUED | OUTPATIENT
Start: 2022-11-25 | End: 2022-11-25

## 2022-11-25 RX ORDER — ASPIRIN 325 MG
325 TABLET ORAL DAILY PRN
Status: ON HOLD | COMMUNITY
End: 2022-11-28 | Stop reason: HOSPADM

## 2022-11-25 RX ORDER — SODIUM CHLORIDE 0.9 % (FLUSH) 0.9 %
10 SYRINGE (ML) INJECTION
Status: DISCONTINUED | OUTPATIENT
Start: 2022-11-25 | End: 2022-11-28 | Stop reason: HOSPADM

## 2022-11-25 RX ORDER — KETAMINE HCL IN 0.9 % NACL 50 MG/5 ML
SYRINGE (ML) INTRAVENOUS
Status: DISCONTINUED | OUTPATIENT
Start: 2022-11-25 | End: 2022-11-25

## 2022-11-25 RX ORDER — MIDAZOLAM HYDROCHLORIDE 1 MG/ML
INJECTION, SOLUTION INTRAMUSCULAR; INTRAVENOUS
Status: DISCONTINUED | OUTPATIENT
Start: 2022-11-25 | End: 2022-11-25

## 2022-11-25 RX ORDER — FENTANYL CITRATE 50 UG/ML
INJECTION, SOLUTION INTRAMUSCULAR; INTRAVENOUS
Status: DISCONTINUED | OUTPATIENT
Start: 2022-11-25 | End: 2022-11-25

## 2022-11-25 RX ORDER — LIDOCAINE HCL/PF 100 MG/5ML
SYRINGE (ML) INTRAVENOUS
Status: DISCONTINUED | OUTPATIENT
Start: 2022-11-25 | End: 2022-11-25

## 2022-11-25 RX ORDER — PROPOFOL 10 MG/ML
VIAL (ML) INTRAVENOUS
Status: DISCONTINUED | OUTPATIENT
Start: 2022-11-25 | End: 2022-11-25

## 2022-11-25 RX ORDER — CARVEDILOL 6.25 MG/1
6.25 TABLET ORAL 2 TIMES DAILY
Status: DISCONTINUED | OUTPATIENT
Start: 2022-11-25 | End: 2022-11-28 | Stop reason: HOSPADM

## 2022-11-25 RX ORDER — BUPIVACAINE HYDROCHLORIDE 5 MG/ML
INJECTION, SOLUTION EPIDURAL; INTRACAUDAL
Status: DISCONTINUED | OUTPATIENT
Start: 2022-11-25 | End: 2022-11-25 | Stop reason: HOSPADM

## 2022-11-25 RX ORDER — HALOPERIDOL 5 MG/ML
0.5 INJECTION INTRAMUSCULAR EVERY 10 MIN PRN
Status: DISCONTINUED | OUTPATIENT
Start: 2022-11-25 | End: 2022-11-28 | Stop reason: HOSPADM

## 2022-11-25 RX ADMIN — CEFTRIAXONE 2 G: 2 INJECTION, POWDER, FOR SOLUTION INTRAMUSCULAR; INTRAVENOUS at 09:11

## 2022-11-25 RX ADMIN — Medication 10 MG: at 08:11

## 2022-11-25 RX ADMIN — INSULIN ASPART 3 UNITS: 100 INJECTION, SOLUTION INTRAVENOUS; SUBCUTANEOUS at 08:11

## 2022-11-25 RX ADMIN — MIDAZOLAM HYDROCHLORIDE 2 MG: 1 INJECTION, SOLUTION INTRAMUSCULAR; INTRAVENOUS at 07:11

## 2022-11-25 RX ADMIN — ASPIRIN 81 MG: 81 TABLET, COATED ORAL at 08:11

## 2022-11-25 RX ADMIN — LIDOCAINE HYDROCHLORIDE 100 MG: 20 INJECTION, SOLUTION INTRAVENOUS at 07:11

## 2022-11-25 RX ADMIN — LISINOPRIL 40 MG: 20 TABLET ORAL at 08:11

## 2022-11-25 RX ADMIN — PROPOFOL 10 MG: 10 INJECTION, EMULSION INTRAVENOUS at 07:11

## 2022-11-25 RX ADMIN — Medication 30 MG: at 07:11

## 2022-11-25 RX ADMIN — Medication 50 MCG/KG/MIN: at 07:11

## 2022-11-25 RX ADMIN — SODIUM CHLORIDE: 0.9 INJECTION, SOLUTION INTRAVENOUS at 07:11

## 2022-11-25 RX ADMIN — OXYCODONE HYDROCHLORIDE AND ACETAMINOPHEN 500 MG: 500 TABLET ORAL at 08:11

## 2022-11-25 RX ADMIN — FENTANYL CITRATE 50 MCG: 50 INJECTION, SOLUTION INTRAMUSCULAR; INTRAVENOUS at 07:11

## 2022-11-25 RX ADMIN — INSULIN ASPART 10 UNITS: 100 INJECTION, SOLUTION INTRAVENOUS; SUBCUTANEOUS at 11:11

## 2022-11-25 RX ADMIN — INSULIN ASPART 4 UNITS: 100 INJECTION, SOLUTION INTRAVENOUS; SUBCUTANEOUS at 11:11

## 2022-11-25 NOTE — PHARMACY MED REC
"Admission Medication History     The home medication history was taken by Vanita Parekh.    You may go to "Admission" then "Reconcile Home Medications" tabs to review and/or act upon these items.     The home medication list has been updated by the Pharmacy department.   Please read ALL comments highlighted in yellow.   Please address this information as you see fit.    Feel free to contact us if you have any questions or require assistance.          Medications listed below were obtained from: Patient/family  PTA Medications   Medication Sig    acetaminophen (TYLENOL) 500 MG tablet   Take 1,000 mg by mouth daily as needed for Pain.    ascorbic acid, vitamin C, (VITAMIN C) 250 MG tablet   Take 500 mg by mouth once daily.    aspirin (ECOTRIN) 81 MG EC tablet   Take 1 tablet (81 mg total) by mouth once daily.    aspirin 325 MG tablet   Take 325 mg by mouth daily as needed for Pain.    atorvastatin (LIPITOR) 80 MG tablet   Take 1 tablet (80 mg total) by mouth once daily. Take every night.    bismuth subsalicylate (PEPTO BISMOL) 262 mg/15 mL suspension   Take 15 mLs by mouth daily as needed (diarrhea).    carvediloL (COREG) 12.5 MG tablet   Take 12.5 mg by mouth once daily.)    ciprofloxacin HCl (CIPRO) 500 MG tablet   Take 1 tablet (500 mg total) by mouth 2 (two) times daily.    dapagliflozin (FARXIGA) 5 mg Tab tablet   Take 1 tablet (5 mg total) by mouth once daily.    ibuprofen (ADVIL,MOTRIN) 200 MG tablet   Take 400 mg by mouth daily as needed for Pain.    insulin lispro (HUMALOG U-100 INSULIN) 100 unit/mL injection    Inject 15 Units into the skin 3 (three) times daily before meals. Gives via insulin pump only. Do not Directly inject.)    lisinopriL (PRINIVIL,ZESTRIL) 40 MG tablet   Take 1 tablet by mouth once daily    metFORMIN (GLUCOPHAGE) 1000 MG tablet   Take 1 tablet (1,000 mg total) by mouth 2 (two) times daily with meals.    multivit-min/folic/vit K/lycop (MEN'S MULTIVITAMIN ORAL)   Take 1 tablet by mouth " "once daily.    TRULICITY 1.5 mg/0.5 mL pen injector   Inject 1.5 mg into the skin every Wednesday.)    blood sugar diagnostic (ACCU-CHEK GUIDE TEST STRIPS) Strp   1 each by Misc.(Non-Drug; Combo Route) route 5 (five) times daily.    insulin (LANTUS SOLOSTAR U-100 INSULIN) glargine 100 units/mL SubQ pen   Inject 50 Units into the skin once daily. USE AS BACK UP INSULIN ONLY - EMERGENCY USE IF YOUR ARE OFF OF YOUR INSULIN PUMP    lancets (ACCU-CHEK FASTCLIX LANCET DRUM) Misc   1 each by Misc.(Non-Drug; Combo Route) route Daily.    MINIMED 770G INSULIN PUMP Misc     1 each by Misc.(Non-Drug; Combo Route) route continuous. Medically necessary for management of Type 2 diabetes, E11.65    pen needle, diabetic 31 gauge x 3/16" Ndle Inject 1 each into the skin 4 (four) times daily.         Vanita Parekh  EXT [id number]                  .        "

## 2022-11-25 NOTE — ASSESSMENT & PLAN NOTE
Endocrinology consulted for BG management.   BG goal 140-180    - Patient does not have pump supplies and is not a good candidate for pump therapy at this time.     Re-weight-based at 0.5 units/kg/day (Basal/Prandial Matching) due to FBG and prandial BG above goal  - Levemir (Insulin Detemir) 15 units BID  - Novolog (Insulin Aspart) 10 units TIDWM and prn for BG excursions LDC SSI (150/50)  - BG checks AC/HS  - Hypoglycemia protocol in place      ** Please notify Endocrine for any change and/or advance in diet**  ** Please call Endocrine for any BG related issues **    Discharge Planning:   TBD. Please notify endocrinology prior to discharge.  - If discharged today would recommend resuming home insulin pump given BG improvement.

## 2022-11-25 NOTE — PLAN OF CARE
11/25/22 1451   Post-Acute Status   Post-Acute Authorization [facility]   Hospital Resources/Appts/Education Provided Appointments scheduled and added to AVS   Discharge Delays None known at this time   Discharge Plan   Discharge Plan A Home Health   Discharge Plan B Skilled Nursing Facility

## 2022-11-25 NOTE — PLAN OF CARE
[facility]ul[facility] ariadna - [facility]  Discharge Reassessment    Primary Care Provider: BRAD Baker MD    Expected Discharge Date: 11/26/2022    Reassessment (most recent)       Discharge Reassessment - 11/25/22 1451          Discharge Reassessment    Assessment Type Discharge Planning Reassessment     Did the patient's condition or plan change since previous assessment? No     Discharge Plan discussed with: Patient     Communicated DARVIN with patient/caregiver Yes     Discharge Plan A Home Health     Discharge Plan B Skilled Nursing Facility     DME Needed Upon Discharge  other (see comments);none   TBD    Discharge Barriers Identified None     Why the patient remains in the hospital Requires continued medical care        Post-Acute Status    Hospital Resources/Appts/Education Provided Appointments scheduled and added to AVS     Discharge Delays None known at this time

## 2022-11-25 NOTE — SUBJECTIVE & OBJECTIVE
Subjective:     Interval History: Patient says he feels ok today. Patient denies any fever chills nausea or vomiting. Patient is stable at time of this visit. Patient has no other pedal concerns at this time.      Scheduled Meds:   ascorbic acid (vitamin C)  500 mg Oral Daily    aspirin  81 mg Oral Daily    atorvastatin  80 mg Oral QHS    carvediloL  12.5 mg Oral BID    cefTRIAXone (ROCEPHIN) IVPB  2 g Intravenous Q24H    heparin (porcine)  5,000 Units Subcutaneous Q8H    insulin aspart U-100  3-6 Units Subcutaneous TIDWM    insulin detemir U-100  9 Units Subcutaneous BID    lisinopriL  40 mg Oral Daily     Continuous Infusions:  PRN Meds:acetaminophen, albuterol-ipratropium, bisacodyL, dextrose 10%, dextrose 10%, glucagon (human recombinant), glucose, glucose, insulin aspart U-100, melatonin, naloxone, ondansetron, oxyCODONE, oxyCODONE, polyethylene glycol, promethazine, sodium chloride 0.9%    Review of Systems   Constitutional:  Negative for activity change, chills, diaphoresis and fever.   HENT:  Negative for trouble swallowing.    Eyes:  Negative for photophobia and visual disturbance.   Respiratory:  Negative for cough, chest tightness, shortness of breath and wheezing.    Cardiovascular:  Negative for chest pain, palpitations and leg swelling.   Gastrointestinal:  Negative for abdominal pain, constipation, diarrhea, nausea and vomiting.   Genitourinary:  Negative for dysuria, frequency, hematuria and urgency.   Musculoskeletal:  Negative for arthralgias, back pain and gait problem.   Skin:  Positive for color change (right 3rd toe) and wound (right 3rd toe). Negative for rash.   Neurological:  Negative for dizziness, syncope, weakness, light-headedness, numbness and headaches.   Psychiatric/Behavioral:  Negative for agitation and confusion. The patient is not nervous/anxious.    Objective:     Vital Signs (Most Recent):  Temp: 96.9 °F (36.1 °C) (11/24/22 1941)  Pulse: (!) 57 (11/24/22 2009)  Resp: 17  (11/24/22 1941)  BP: 129/65 (11/24/22 1941)  SpO2: 97 % (11/24/22 1941)   Vital Signs (24h Range):  Temp:  [96.9 °F (36.1 °C)-98.4 °F (36.9 °C)] 96.9 °F (36.1 °C)  Pulse:  [51-60] 57  Resp:  [17-18] 17  SpO2:  [94 %-97 %] 97 %  BP: ()/(54-75) 129/65     Weight: 127 kg (280 lb)  Body mass index is 35.95 kg/m².    Foot Exam    General  Orientation: alert and oriented to person, place, and time       Right Foot/Ankle     Inspection and Palpation  Tenderness: none   Swelling: (3rd toe)  Skin Exam: callus, drainage, dry skin, cellulitis, skin changes, abnormal color, ulcer and erythema; skin not intact     Neurovascular  Dorsalis pedis: 1+  Posterior tibial: doppler  Saphenous nerve sensation: absent  Tibial nerve sensation: absent  Superficial peroneal nerve sensation: absent  Deep peroneal nerve sensation: absent  Sural nerve sensation: absent    Comments  -Partial 5th ray amputated  -3rd toe with lateral wound down to bone, fibronecrotic wound bed, serous drainage, mild malodor. Not tender. Chronic periwound skin thickening.   -2nd metatarsal head plantar wound down to bone, fibrotic wound bed, serosanguinous drainage. Not tender.     Left Foot/Ankle      Inspection and Palpation  Tenderness: none   Swelling: none   Skin Exam: callus and dry skin; no drainage, no cellulitis, no ulcer and no erythema     Neurovascular  Dorsalis pedis: 1+  Posterior tibial: doppler  Saphenous nerve sensation: absent  Tibial nerve sensation: absent  Superficial peroneal nerve sensation: absent  Deep peroneal nerve sensation: absent  Sural nerve sensation: absent    Comments  -Partial 1st ray amputated              Laboratory:  A1C:   Recent Labs   Lab 06/08/22  0036 08/09/22  1634 11/10/22  0835   HGBA1C 10.0* 8.0* 7.1*     Blood Cultures: No results for input(s): LABBLOO in the last 48 hours.  CBC:   Recent Labs   Lab 11/22/22  1154   WBC 10.62   RBC 4.93   HGB 14.7   HCT 43.2      MCV 88   MCH 29.8   MCHC 34.0     CMP:    Recent Labs   Lab 11/24/22  0320   *   CALCIUM 8.4*   ALBUMIN 2.9*   PROT 6.3   *   K 4.1   CO2 24      BUN 22*   CREATININE 1.0   ALKPHOS 80   ALT 21   AST 15   BILITOT 0.4     CRP:   Recent Labs   Lab 11/22/22  1154   CRP 15.6*     ESR:   Recent Labs   Lab 11/22/22  1154   SEDRATE 56*     Prealbumin: No results for input(s): PREALBUMIN in the last 48 hours.  Wound Cultures:   Recent Labs   Lab 06/08/22  0206 06/08/22  1231 06/09/22  1514 11/15/22  1654   LABAERO KLEBSIELLA PNEUMONIAE ESBL  Many  *  STREPTOCOCCUS AGALACTIAE (GROUP B)  Many  Beta-hemolytic streptococci are routinely susceptible to   penicillins,cephalosporins and carbapenems.  Susceptibility testing not routinely performed  Skin josselin also present  * STREPTOCOCCUS AGALACTIAE (GROUP B)  Many  Beta-hemolytic streptococci are routinely susceptible to   penicillins,cephalosporins and carbapenems.  Susceptibility testing not routinely performed  *  KLEBSIELLA PNEUMONIAE ESBL  Few  * STREPTOCOCCUS AGALACTIAE (GROUP B)  Rare  Beta-hemolytic streptococci are routinely susceptible to   penicillins,cephalosporins and carbapenems.  Susceptibility testing not routinely performed  * STAPHYLOCOCCUS AUREUS  Many  *  PASTEURELLA MULTOCIDA   Many  Susceptibility testing not routinely performed  *     Microbiology Results (last 7 days)       Procedure Component Value Units Date/Time    Blood culture #1 **CANNOT BE ORDERED STAT** [[id number]] Collected: 11/22/22 1153    Order Status: Completed Specimen: Blood from Peripheral, Forearm, Right Updated: 11/24/22 1412     Blood Culture, Routine No Growth to date      No Growth to date      No Growth to date    Blood culture #2 **CANNOT BE ORDERED STAT** [[id number]] Collected: 11/22/22 1153    Order Status: Completed Specimen: Blood from Peripheral, Forearm, Right Updated: 11/24/22 1412     Blood Culture, Routine No Growth to date      No Growth to date      No Growth to date          Specimen (24h  ago, onward)      None

## 2022-11-25 NOTE — PROGRESS NOTES
[facility] Medicine  Progress Note    Patient Name: Billy Rivera  MRN: [id number]  Patient Class: IP- Inpatient   Admission Date: 11/22/2022  Length of Stay: 3 days  Attending Physician: Terrance Sierra MD  Primary Care Provider: BRAD Baker MD        Subjective:     Principal Problem:Diabetic foot infection        HPI:  Billy Rivera is a 57 y.o. male with a history of type 2 diabetes, CAD, HTN, and colon cancer admitted to [facility] with diabetic foot infection to his right 3rd toe. He was referred from his podiatrist, Dr. Melara, due to a right 3rd toe ulcer with infection that will likely require amputation. Endorses that toe is malodorous and has some purulent drainage. Endorses associated peripheral neuropathy. Denies associated toe pain, ankle or leg pain. He has had two previous amputations (left great toe and right 5th toe) due to similar conditions. Per his podiatrist, he was taking Doxy and Cipro since 11/15/2022 for the current right 3rd toe infection and per patient he has been started on IV abx upon arrival to hospital. He reports that his toe infections began around 3 years ago after stepping on piece of glass barefoot. Denies associated headache, fever, chills, CP, SOB, N/V/D, abdominal pain, urinary symptoms, numbness or tingling.       ED: VSS, AF. WBC 10, lactic acid elevated to 2.3. ESR & CRP mildly elevated, other labs unremarkable. Blood cultures pending. Right foot XR showing DJD without acute fracture or bone destruction. Given 1 L IVF and started on IV Vanc and Cefepime.      Overview/Hospital Course:  Going for amputation today around 7pm. Anticipate discontinuing abx after amputation      Interval History:   No acute events overnight. Patient to go for amputation later today. Denies any issues at this time.    Review of Systems   Constitutional:  Negative for activity change, chills, diaphoresis and fever.   HENT:  Negative for trouble swallowing.     Eyes:  Negative for photophobia and visual disturbance.   Respiratory:  Negative for cough, chest tightness, shortness of breath and wheezing.    Cardiovascular:  Negative for chest pain, palpitations and leg swelling.   Gastrointestinal:  Negative for abdominal pain, constipation, diarrhea, nausea and vomiting.   Genitourinary:  Negative for dysuria, frequency, hematuria and urgency.   Musculoskeletal:  Negative for arthralgias, back pain and gait problem.   Skin:  Positive for color change (right 3rd toe) and wound (right 3rd toe). Negative for rash.   Neurological:  Negative for dizziness, syncope, weakness, light-headedness, numbness and headaches.   Psychiatric/Behavioral:  Negative for agitation and confusion. The patient is not nervous/anxious.    Objective:     Vital Signs (Most Recent):  Temp: 97 °F (36.1 °C) (11/25/22 1101)  Pulse: 61 (11/25/22 1101)  Resp: 18 (11/25/22 1101)  BP: 135/71 (11/25/22 1101)  SpO2: 96 % (11/25/22 1101)   Vital Signs (24h Range):  Temp:  [96.9 °F (36.1 °C)-98.2 °F (36.8 °C)] 97 °F (36.1 °C)  Pulse:  [49-62] 61  Resp:  [17-18] 18  SpO2:  [95 %-97 %] 96 %  BP: ()/(53-75) 135/71     Weight: 127 kg (280 lb)  Body mass index is 35.95 kg/m².    Intake/Output Summary (Last 24 hours) at 11/25/2022 1114  Last data filed at 11/24/2022 1700  Gross per 24 hour   Intake 600 ml   Output --   Net 600 ml        Physical Exam  Vitals and nursing note reviewed.   Constitutional:       General: He is not in acute distress.     Appearance: He is well-developed. He is obese. He is not toxic-appearing or diaphoretic.   HENT:      Head: Normocephalic and atraumatic.      Mouth/Throat:      Mouth: Mucous membranes are moist.      Pharynx: No oropharyngeal exudate.   Eyes:      Conjunctiva/sclera: Conjunctivae normal.   Cardiovascular:      Rate and Rhythm: Normal rate and regular rhythm.      Heart sounds: Normal heart sounds.   Pulmonary:      Effort: Pulmonary effort is normal. No respiratory  distress.      Breath sounds: Wheezing (end expiratory) present.   Abdominal:      General: Bowel sounds are normal. There is no distension.      Palpations: Abdomen is soft.      Tenderness: There is no abdominal tenderness.   Musculoskeletal:      Cervical back: Normal range of motion and neck supple.      Right lower leg: Edema (pitting tibial) present.      Left lower leg: Edema (pitting tibial) present.      Comments: Unable to assess ROM, swelling, tenderness or infection site as patient's toe was wrapped in dressing. Dressing clean, dry and intact.   Lymphadenopathy:      Cervical: No cervical adenopathy.   Skin:     General: Skin is warm and dry.      Capillary Refill: Capillary refill takes less than 2 seconds.      Findings: Erythema (see comment) present. No rash.      Comments: During exam patient's toe was dressed. Dressing clean, dry and intact. Per pictures in patient chart - right 3rd toe with ulcer on lateral aspect, purulent drainage. Additional ulcer on ventral aspect of foot.   Neurological:      Mental Status: He is alert and oriented to person, place, and time.      Cranial Nerves: No cranial nerve deficit.      Sensory: No sensory deficit.      Coordination: Coordination normal.   Psychiatric:         Behavior: Behavior normal.         Thought Content: Thought content normal.         Judgment: Judgment normal.       Significant Labs: All pertinent labs within the past 24 hours have been reviewed.  CBC: No results for input(s): WBC, HGB, HCT, PLT in the last 48 hours.  CMP:   Recent Labs   Lab 11/24/22  0320 11/25/22  0336   * 130*   K 4.1 4.4    100   CO2 24 22*   * 331*   BUN 22* 21*   CREATININE 1.0 0.9   CALCIUM 8.4* 8.6*   PROT 6.3 6.5   ALBUMIN 2.9* 3.0*   BILITOT 0.4 0.3   ALKPHOS 80 86   AST 15 16   ALT 21 25   ANIONGAP 7* 8         Significant Imaging: I have reviewed all pertinent imaging results/findings within the past 24 hours.      Assessment/Plan:      *  Diabetic foot infection  Right foot pain  Pt with 2 previous toe amputations for similar infections - left great toe and right 5th toe - most recent 06/2022. Previous cultures 06/2022 significant for strep agalactiae (GBS) and prevotella bivia. Cultures by podiatry clinic 11/15/2022 significant for staph aureus and pasturella multocida. ESR & CRP elevated. WBC without leukocytosis  - current blood cultures NGTD  - podiatry consulted, will follow recs  - Tentative plan for amputation 3rd toe right later tonight 11/25  - rocephin should cover the staph and pasturella from 11/15 bone biopsy  - keep foot elevated and dry  - pt currently pain free, pain medication prn if needed   - hold home diabetic medications    Right foot pain  See diabetic foot infection      CAD (coronary artery disease)  - continue HTN management   - ekg prn for chest pain    Diabetic foot ulcer associated with type 2 diabetes mellitus  Patient's FSGs are controlled on current medication regimen.  Last A1c reviewed-   Lab Results   Component Value Date    HGBA1C 7.1 (H) 11/10/2022     Most recent fingerstick glucose reviewed- No results for input(s): POCTGLUCOSE in the last 24 hours.  Current correctional scale  Low  Maintain anti-hyperglycemic dose as follows-     Hold Oral hypoglycemics while patient is in the hospital.  Antihyperglycemics (From admission, onward)    Start     Stop Route Frequency Ordered    11/22/22 1645  insulin aspart U-100 pen 3 Units         -- SubQ 3 times daily with meals 11/22/22 1422 11/22/22 1530  insulin detemir U-100 pen 9 Units         -- SubQ Daily 11/22/22 1422        Hold Oral hypoglycemics while patient is in the hospital.    Hypertension associated with diabetes  - Decrease coreg to 6.25 mg BID  - continue lisinopril 40 mg daily      VTE Risk Mitigation (From admission, onward)         Ordered     heparin (porcine) injection 5,000 Units  Every 8 hours         11/22/22 1422     IP VTE HIGH RISK PATIENT  Once          11/22/22 1422     Place sequential compression device  Until discontinued         11/22/22 1422                Discharge Planning   DARVIN: 11/26/2022     Code Status: Full Code   Is the patient medically ready for discharge?: No    Reason for patient still in hospital (select all that apply): Patient trending condition  Discharge Plan A: Home Health                  Terrance Sierra MD  Department of Hospital Medicine   [facility]

## 2022-11-25 NOTE — ASSESSMENT & PLAN NOTE
Right foot pain  Pt with 2 previous toe amputations for similar infections - left great toe and right 5th toe - most recent 06/2022. Previous cultures 06/2022 significant for strep agalactiae (GBS) and prevotella bivia. Cultures by podiatry clinic 11/15/2022 significant for staph aureus and pasturella multocida. ESR & CRP elevated. WBC without leukocytosis  - current blood cultures NGTD  - podiatry consulted, will follow recs  - Tentative plan for amputation 3rd toe right later tonight 11/25  - rocephin should cover the staph and pasturella from 11/15 bone biopsy  - keep foot elevated and dry  - pt currently pain free, pain medication prn if needed   - hold home diabetic medications

## 2022-11-25 NOTE — PROGRESS NOTES
"[facility]  Endocrinology  Progress Note    Admit Date: 2022     Reason for Consult: Management of T2DM, Hyperglycemia      Diabetes diagnosis year:   "Around 39 years old"     Home Diabetes Medications:    Metformin 1000 mg BID; Farxiga 5 mg and Trulicity 1.5 mg weekly.   (Pump not available to review settings at time of consult; settings taken from previous note).   Medtronic 770G  Pump Settings:  Basal 1.9 units/hr  ICR: 1:56 (patient uses pre-set bolus parameters 5 units for snack; 10 units for small meals; 15 units for large meals)  ISF 1:25  Target B-120 mg/dL  IOB: 4 hours     How often checking glucose at home? 1-3 x day   BG readings on regimen: 100-200's  Hypoglycemia on the regimen?  No  Missed doses on regimen?  No     Diabetes Complications include:     Hyperglycemia, Diabetic retinopathy , Foot ulcer. Foot wound,   and Other skin ulcer     Complicating diabetes co morbidities:   History of MI and MICHAELA, CAD, HTN, HLD,         HPI:Billy Rivera is a 57 y.o. male with a history of type 2 diabetes, CAD, HTN, and colon cancer admitted to [facility] with diabetic foot infection to his right 3rd toe. He was referred from his podiatrist, Dr. Melara, due to a right 3rd toe ulcer with infection that will likely require amputation. Endorses that toe is malodorous and has some purulent drainage. Endorses associated peripheral neuropathy. Denies associated toe pain, ankle or leg pain. He has had two previous amputations (left great toe and right 5th toe) due to similar conditions. Per his podiatrist, he was taking Doxy and Cipro since 11/15/2022 for the current right 3rd toe infection and per patient he has been started on IV abx upon arrival to hospital. He reports that his toe infections began around 3 years ago after stepping on piece of glass barefoot. Denies associated headache, fever, chills, CP, SOB, N/V/D, abdominal pain, urinary symptoms, numbness or tingling. Endocrine consulted " "to manage type 2 diabetes and hyperglycemia.     Lab Results   Component Value Date    HGBA1C 7.1 (H) 11/10/2022              Interval HPI:   Overnight events: No acute events overnight. Patient on the [facility] in room 603/603 A. Blood glucose worsening. BG at and above goal on current insulin regimen (SSI, prandial, and basal insulin ). Steroid use- None. 2 Days Post-Op  Renal function- Normal   Vasopressors-  None       Endocrine will continue to follow and manage insulin orders inpatient.         Diet NPO Except for: Medication, Sips with Medication     Eating:   NPO  Nausea: No  Hypoglycemia and intervention: No  Fever: No  TPN and/or TF: No      /71 (BP Location: Right arm, Patient Position: Lying)   Pulse (!) 55   Temp 97 °F (36.1 °C) (Oral)   Resp 18   Ht 6' 2" (1.88 m)   Wt 127 kg (280 lb)   SpO2 96%   BMI 35.95 kg/m²     Labs Reviewed and Include    Recent Labs   Lab 11/25/22  0336   *   CALCIUM 8.6*   ALBUMIN 3.0*   PROT 6.5   *   K 4.4   CO2 22*      BUN 21*   CREATININE 0.9   ALKPHOS 86   ALT 25   AST 16   BILITOT 0.3     Lab Results   Component Value Date    WBC 10.62 11/22/2022    HGB 14.7 11/22/2022    HCT 43.2 11/22/2022    MCV 88 11/22/2022     11/22/2022     No results for input(s): TSH, FREET4 in the last 168 hours.  Lab Results   Component Value Date    HGBA1C 7.1 (H) 11/10/2022       Nutritional status:   Body mass index is 35.95 kg/m².  Lab Results   Component Value Date    ALBUMIN 3.0 (L) 11/25/2022    ALBUMIN 2.9 (L) 11/24/2022    ALBUMIN 3.0 (L) 11/23/2022     Lab Results   Component Value Date    PREALBUMIN 22 07/16/2018    PREALBUMIN 21 07/09/2018    PREALBUMIN 20 07/02/2018       Estimated Creatinine Clearance: 126.7 mL/min (based on SCr of 0.9 mg/dL).    Accu-Checks  Recent Labs     11/22/22  1551 11/23/22  0851 11/23/22  1120 11/23/22  1547 11/23/22  2004 11/24/22  0707 11/24/22  1113 11/24/22  1555 11/24/22  2030 11/25/22  0733   POCTGLUCOSE 105 151* " 135* 327* 188* 203* 311* 247* 346* 270*       Current Medications and/or Treatments Impacting Glycemic Control  Immunotherapy:    Immunosuppressants       None          Steroids:   Hormones (From admission, onward)      Start     Stop Route Frequency Ordered    11/22/22 1511  melatonin tablet 6 mg         -- Oral Nightly PRN 11/22/22 1422          Pressors:    Autonomic Drugs (From admission, onward)      None          Hyperglycemia/Diabetes Medications:   Antihyperglycemics (From admission, onward)      Start     Stop Route Frequency Ordered    11/25/22 1130  insulin aspart U-100 pen 10 Units         -- SubQ 3 times daily with meals 11/25/22 0830    11/25/22 0900  insulin detemir U-100 pen 15 Units         -- SubQ 2 times daily 11/25/22 0830    11/23/22 1341  insulin aspart U-100 pen 0-5 Units         -- SubQ Before meals & nightly PRN 11/23/22 1241            ASSESSMENT and PLAN    * Diabetic foot infection  Optimize BG control to improve wound healing  Managed per primary team  Avoid hypoglycemia        Type 2 diabetes mellitus with diabetic peripheral angiopathy without gangrene, with long-term current use of insulin  Endocrinology consulted for BG management.   BG goal 140-180    - Patient does not have pump supplies and is not a good candidate for pump therapy at this time.     Re-weight-based at 0.5 units/kg/day (Basal/Prandial Matching) due to FBG and prandial BG above goal  - Levemir (Insulin Detemir) 15 units BID  - Novolog (Insulin Aspart) 10 units TIDWM and prn for BG excursions LDC SSI (150/50)  - BG checks AC/HS  - Hypoglycemia protocol in place      ** Please notify Endocrine for any change and/or advance in diet**  ** Please call Endocrine for any BG related issues **    Discharge Planning:   TBD. Please notify endocrinology prior to discharge.  - If discharged today would recommend resuming home insulin pump given BG improvement.     Hypertension associated with diabetes    Uncontrolled HTN can  result in worsening insulin resistance. On an ACE-I per ADA guidelines.          Kenneth Hughes DNP, FNP  Endocrinology  [facility]

## 2022-11-25 NOTE — SUBJECTIVE & OBJECTIVE
"Interval HPI:   Overnight events: No acute events overnight. Patient on the [facility]SU in room 603/603 A. Blood glucose worsening. BG at and above goal on current insulin regimen (SSI, prandial, and basal insulin ). Steroid use- None. 2 Days Post-Op  Renal function- Normal   Vasopressors-  None       Endocrine will continue to follow and manage insulin orders inpatient.         Diet NPO Except for: Medication, Sips with Medication     Eating:   NPO  Nausea: No  Hypoglycemia and intervention: No  Fever: No  TPN and/or TF: No      /71 (BP Location: Right arm, Patient Position: Lying)   Pulse (!) 55   Temp 97 °F (36.1 °C) (Oral)   Resp 18   Ht 6' 2" (1.88 m)   Wt 127 kg (280 lb)   SpO2 96%   BMI 35.95 kg/m²     Labs Reviewed and Include    Recent Labs   Lab 11/25/22  0336   *   CALCIUM 8.6*   ALBUMIN 3.0*   PROT 6.5   *   K 4.4   CO2 22*      BUN 21*   CREATININE 0.9   ALKPHOS 86   ALT 25   AST 16   BILITOT 0.3     Lab Results   Component Value Date    WBC 10.62 11/22/2022    HGB 14.7 11/22/2022    HCT 43.2 11/22/2022    MCV 88 11/22/2022     11/22/2022     No results for input(s): TSH, FREET4 in the last 168 hours.  Lab Results   Component Value Date    HGBA1C 7.1 (H) 11/10/2022       Nutritional status:   Body mass index is 35.95 kg/m².  Lab Results   Component Value Date    ALBUMIN 3.0 (L) 11/25/2022    ALBUMIN 2.9 (L) 11/24/2022    ALBUMIN 3.0 (L) 11/23/2022     Lab Results   Component Value Date    PREALBUMIN 22 07/16/2018    PREALBUMIN 21 07/09/2018    PREALBUMIN 20 07/02/2018       Estimated Creatinine Clearance: 126.7 mL/min (based on SCr of 0.9 mg/dL).    Accu-Checks  Recent Labs     11/22/22  1551 11/23/22  0851 11/23/22  1120 11/23/22  1547 11/23/22  2004 11/24/22  0707 11/24/22  1113 11/24/22  1555 11/24/22  2030 11/25/22  0733   POCTGLUCOSE 105 151* 135* 327* 188* 203* 311* 247* 346* 270*       Current Medications and/or Treatments Impacting Glycemic Control  Immunotherapy:  "   Immunosuppressants       None          Steroids:   Hormones (From admission, onward)      Start     Stop Route Frequency Ordered    11/22/22 1511  melatonin tablet 6 mg         -- Oral Nightly PRN 11/22/22 1422          Pressors:    Autonomic Drugs (From admission, onward)      None          Hyperglycemia/Diabetes Medications:   Antihyperglycemics (From admission, onward)      Start     Stop Route Frequency Ordered    11/25/22 1130  insulin aspart U-100 pen 10 Units         -- SubQ 3 times daily with meals 11/25/22 0830    11/25/22 0900  insulin detemir U-100 pen 15 Units         -- SubQ 2 times daily 11/25/22 0830    11/23/22 1341  insulin aspart U-100 pen 0-5 Units         -- SubQ Before meals & nightly PRN 11/23/22 1241

## 2022-11-25 NOTE — ANESTHESIA PREPROCEDURE EVALUATION
[facility]  Anesthesia Pre-Operative Evaluation         Patient Name: Billy Rivera  YOB: 1965  MRN: [id number]    SUBJECTIVE:     Pre-operative evaluation for Procedure(s) (LRB):  AMPUTATION, TOE (Right)     11/24/2022    Billy Rivera is a 57 y.o. male w/ a significant PMHx of poorly controlled T2DM on insulin, CAD, HTN, and colon cancer admitted to [facility] with diabetic foot infection to his right 3rd toe.    Patient now presents for the above procedure(s).    Pt requests pictures of his foot during the operation.    LDA: None documented.       Prev airway: None documented.    Drips: None documented.      Patient Active Problem List   Diagnosis    Coronary artery disease involving native coronary artery of native heart without angina pectoris    BDR (background diabetic retinopathy) - Both Eyes    Uncontrolled type II diabetes mellitus    Peripheral arterial disease    Proteinuria    Edema    Hypertension associated with diabetes    Type 2 diabetes mellitus with diabetic peripheral angiopathy without gangrene, with long-term current use of insulin    Onychomycosis of multiple toenails with type 2 diabetes mellitus    Type 2 diabetes mellitus 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, with long-term current use of insulin    Hyponatremia    Anemia    Hypomagnesemia    Diabetic foot ulcer associated with type 2 diabetes mellitus    Insomnia    Hx of colon cancer, stage I    Dyslipidemia associated with type 2 diabetes mellitus    Diabetes mellitus with insulin therapy    Obesity, diabetes, and hypertension syndrome    Class 2 severe obesity with body mass index (BMI) of 35 to 39.9 with serious comorbidity    Diabetes mellitus with microalbuminuria    Diabetes mellitus with peripheral circulatory disorder    Diabetic foot infection    Coronary artery disease involving coronary bypass graft of native  heart without angina pectoris    Osteomyelitis of left foot    Amputation of toe of left foot    Insulin pump in place    Osteomyelitis of right foot    CAD (coronary artery disease)    Ulcer of right foot with fat layer exposed    Abscess of right foot    Right foot pain       Review of patient's allergies indicates:   Allergen Reactions    Penicillins Other (See Comments)     PCN allergy as a child - was told he went into a coma. Tolerates Cefazolin without adverse reactions    Shellfish containing products      Other reaction(s): Unknown    Vancomycin Itching     Tolerated vancomycin 7/2020    Bactrim  [sulfamethoxazole-trimethoprim] Rash       Current Inpatient Medications:   ascorbic acid (vitamin C)  500 mg Oral Daily    aspirin  81 mg Oral Daily    atorvastatin  80 mg Oral QHS    carvediloL  12.5 mg Oral BID    cefTRIAXone (ROCEPHIN) IVPB  2 g Intravenous Q24H    heparin (porcine)  5,000 Units Subcutaneous Q8H    insulin aspart U-100  3-6 Units Subcutaneous TIDWM    insulin detemir U-100  9 Units Subcutaneous BID    lisinopriL  40 mg Oral Daily       No current facility-administered medications on file prior to encounter.     Current Outpatient Medications on File Prior to Encounter   Medication Sig Dispense Refill    atorvastatin (LIPITOR) 80 MG tablet Take 1 tablet (80 mg total) by mouth once daily. Take every night. 90 tablet 3    dapagliflozin (FARXIGA) 5 mg Tab tablet Take 1 tablet (5 mg total) by mouth once daily. 30 tablet 4    lisinopriL (PRINIVIL,ZESTRIL) 40 MG tablet Take 1 tablet by mouth once daily 30 tablet 0    metFORMIN (GLUCOPHAGE) 1000 MG tablet Take 1 tablet (1,000 mg total) by mouth 2 (two) times daily with meals. 180 tablet 3    TRULICITY 1.5 mg/0.5 mL pen injector Inject 1.5 mg into the skin every 7 days. 4 pen 11    acetaminophen (TYLENOL) 325 MG tablet Take 325 mg by mouth every 6 (six) hours as needed for Pain.      ascorbic acid, vitamin C, (VITAMIN C) 250  "MG tablet Take 500 mg by mouth once daily.      aspirin (ECOTRIN) 81 MG EC tablet Take 1 tablet (81 mg total) by mouth once daily.  0    aspirin/salicylamide/caffeine (BC HEADACHE POWDER ORAL) Take 1 Package by mouth daily as needed (pain).      bismuth subsalicylate (PEPTO BISMOL) 262 mg/15 mL suspension Take 15 mLs by mouth every other day. (As needed for diarrhea)      blood sugar diagnostic (ACCU-CHEK GUIDE TEST STRIPS) Strp 1 each by Misc.(Non-Drug; Combo Route) route 5 (five) times daily. 150 each 11    carvediloL (COREG) 12.5 MG tablet Take 1 tablet (12.5 mg total) by mouth 2 (two) times daily. (Patient taking differently: Take 12.5 mg by mouth once daily.) 180 tablet 3    ciprofloxacin HCl (CIPRO) 500 MG tablet Take 1 tablet (500 mg total) by mouth 2 (two) times daily. 20 tablet 0    doxycycline (VIBRA-TABS) 100 MG tablet Take 1 tablet (100 mg total) by mouth 2 (two) times daily. 14 tablet 0    ibuprofen (ADVIL,MOTRIN) 200 MG tablet Take 400-600 mg by mouth daily as needed for Pain.      insulin (LANTUS SOLOSTAR U-100 INSULIN) glargine 100 units/mL SubQ pen Inject 50 Units into the skin once daily. USE AS BACK UP INSULIN ONLY - EMERGENCY USE IF YOUR ARE OFF OF YOUR INSULIN PUMP 15 mL 1    insulin lispro (HUMALOG U-100 INSULIN) 100 unit/mL injection Inject 100 Units into the skin continuous. Gives via insulin pump only. Do not Directly inject. 50 mL 11    lancets (ACCU-CHEK FASTCLIX LANCET DRUM) Misc 1 each by Misc.(Non-Drug; Combo Route) route Daily. 30 each 11    MINIMED 770G INSULIN PUMP Misc 1 each by Misc.(Non-Drug; Combo Route) route continuous. Medically necessary for management of Type 2 diabetes, E11.65 1 each 0    multivit-min/folic/vit K/lycop (MEN'S MULTIVITAMIN ORAL) Take 1 tablet by mouth once daily.      pen needle, diabetic 31 gauge x 3/16" Ndle Inject 1 each into the skin 3 (three) times daily before meals. (Patient taking differently: Inject 1 each into the skin 4 (four) times " daily.) 100 each 12       Past Surgical History:   Procedure Laterality Date    COLONOSCOPY N/A 2/17/2017    Procedure: COLONOSCOPY;  Surgeon: Simon Gomez MD;  Location: [facility] ENDO (4TH FLR);  Service: Endoscopy;  Laterality: N/A;    CORONARY ANGIOPLASTY      INCISION AND DRAINAGE FOOT Right 6/5/2018    Procedure: INCISION AND DRAINAGE, FOOT;  Surgeon: Bridget Santana DPM;  Location: [facility] OR 1ST FLR;  Service: Podiatry;  Laterality: Right;  Request mini C arm in room. request wound vac with medium black sponge    INCISION AND DRAINAGE FOOT Right 6/7/2018    Procedure: INCISION AND DRAINAGE, FOOT;  Surgeon: Emelia Brock DPM;  Location: [facility] OR 2ND FLR;  Service: Podiatry;  Laterality: Right;    INCISION AND DRAINAGE FOOT Left 9/4/2020    Procedure: INCISION AND DRAINAGE, FOOT;  Surgeon: Ainsley Powell DPM;  Location: [facility] OR 2ND FLR;  Service: Podiatry;  Laterality: Left;    INCISION AND DRAINAGE FOOT Left 12/22/2020    Procedure: INCISION AND DRAINAGE, FOOT;  Surgeon: Ainsley Powell DPM;  Location: [facility] OR 2ND FLR;  Service: Podiatry;  Laterality: Left;  May need micro choice  Need Jam shidi needles  Stretcher OK      INCISION AND DRAINAGE OF ABSCESS Right 6/2/2018    Procedure: INCISION AND DRAINAGE, ABSCESS;  Surgeon: Bridget Santana DPM;  Location: [facility] OR 2ND FLR;  Service: General;  Laterality: Right;    OSTEOTOMY OF METATARSAL BONE  9/4/2020    Procedure: OSTEOTOMY, METATARSAL BONE, 1st METATARSAL RESECTION;  Surgeon: Ainsley Powell DPM;  Location: [facility] OR 2ND FLR;  Service: Podiatry;;    REMOVAL OF FOREIGN BODY FROM FOOT Right 6/7/2018    Procedure: REMOVAL, FOREIGN BODY, FOOT;  Surgeon: Emelia Brock DPM;  Location: [facility] OR 2ND FLR;  Service: Podiatry;  Laterality: Right;    TOE AMPUTATION Left 7/4/2020    Procedure: AMPUTATION, TOE;  Surgeon: Bridget Santana DPM;  Location: [facility] OR 2ND FLR;  Service: Podiatry;  Laterality: Left;    TOE AMPUTATION Left 10/14/2020    Procedure:  AMPUTATION, TOE;  Surgeon: Mingo Melara DPM;  Location: [facility] OR 2ND FLR;  Service: Podiatry;  Laterality: Left;  stretcher OK    TOE AMPUTATION Right 6/9/2022    Procedure: AMPUTATION, TOE - Dr. Melara @ [place] in am;  Surgeon: Mingo Melara DPM;  Location: [facility] OR 1ST FLR;  Service: Podiatry;  Laterality: Right;    TOE AMPUTATION Right 11/23/2022    Procedure: AMPUTATION, TOE;  Surgeon: Hansa Robertson DPM;  [facility];  Service: Podiatry;  Laterality: Right;    TONSILLECTOMY         Social History     Socioeconomic History    Marital status:    Tobacco Use    Smoking status: Never    Smokeless tobacco: Never   Substance and Sexual Activity    Alcohol use: Yes     Comment: rare x1 beer-     Drug use: No    Sexual activity: Not Currently       OBJECTIVE:     Vital Signs Range (Last 24H):  Temp:  [36.4 °C (97.6 °F)-36.9 °C (98.4 °F)]   Pulse:  [51-65]   Resp:  [18]   BP: ()/(54-75)   SpO2:  [94 %-97 %]       Significant Labs:  Lab Results   Component Value Date    WBC 10.62 11/22/2022    HGB 14.7 11/22/2022    HCT 43.2 11/22/2022     11/22/2022    CHOL 121 11/10/2022    TRIG 148 11/10/2022    HDL 34 (L) 11/10/2022    ALT 21 11/24/2022    AST 15 11/24/2022     (L) 11/24/2022    K 4.1 11/24/2022     11/24/2022    CREATININE 1.0 11/24/2022    BUN 22 (H) 11/24/2022    CO2 24 11/24/2022    TSH 1.990 01/26/2022    PSA 0.33 01/26/2022    INR 1.0 12/21/2020    HGBA1C 7.1 (H) 11/10/2022       Diagnostic Studies: No relevant studies.    EKG:   Results for orders placed or performed during the hospital encounter of 06/07/22   EKG 12-lead    Collection Time: 06/08/22 12:42 AM    Narrative    Test Reason : Z01.810,    Vent. Rate : 079 BPM     Atrial Rate : 079 BPM     P-R Int : 168 ms          QRS Dur : 090 ms      QT Int : 380 ms       P-R-T Axes : 043 -35 048 degrees     QTc Int : 435 ms    Normal sinus rhythm  Left axis deviation  Abnormal ECG  When  compared with ECG of 13-MAR-2017 15:14,  Vent. rate has increased BY  27 BPM  Confirmed by Billy Yee MD ([id number]) on 6/8/2022 3:50:55 PM    Referred By: DANEERR   SELF           Confirmed By:Billy Yee MD       2D ECHO:  TTE:  No results found for this or any previous visit.    SANDRA:  No results found for this or any previous visit.    ASSESSMENT/PLAN:                                                                                                                  11/24/2022  Billy Rivera is a 57 y.o., male.      Pre-op Assessment    I have reviewed the Patient Summary Reports.     I have reviewed the Nursing Notes. I have reviewed the NPO Status.   I have reviewed the Medications.     Review of Systems  Anesthesia Hx:  No problems with previous Anesthesia  History of prior surgery of interest to airway management or planning: Denies Family Hx of Anesthesia complications.   Denies Personal Hx of Anesthesia complications.   Social:  No Alcohol Use, Non-Smoker    Hematology/Oncology:  Hematology Normal   Oncology Normal     EENT/Dental:EENT/Dental Normal   Cardiovascular:   Hypertension Past MI CAD  CABG/stent     Pulmonary:   Sleep Apnea    Renal/:   Chronic Renal Disease    Hepatic/GI:  Hepatic/GI Normal    Musculoskeletal:   Right toe amputation   Neurological:  Neurology Normal    Endocrine:   Diabetes, poorly controlled, type 2, using insulin  Obesity / BMI > 30  Dermatological:  Skin Normal    Psych:  Psychiatric Normal           Physical Exam  General: Well nourished, Cooperative, Alert and Oriented    Airway:  Mallampati: III / I  Mouth Opening: Normal  TM Distance: Normal  Tongue: Normal  Neck ROM: Normal ROM    Dental:  Intact, Periodontal disease        Anesthesia Plan  Type of Anesthesia, risks & benefits discussed:    Anesthesia Type: MAC, Gen Supraglottic Airway, Gen Natural Airway  Intra-op Monitoring Plan: Standard ASA Monitors  Post Op Pain Control Plan: multimodal  analgesia  Induction:  IV  Airway Plan: Direct, Post-Induction  Informed Consent: Informed consent signed with the Patient and all parties understand the risks and agree with anesthesia plan.  All questions answered.   ASA Score: 3  Day of Surgery Review of History & Physical: H&P completed by Anesthesiologist.    Ready For Surgery From Anesthesia Perspective.     .

## 2022-11-25 NOTE — SUBJECTIVE & OBJECTIVE
Interval History:   No acute events overnight. Patient to go for amputation later today. Denies any issues at this time.    Review of Systems   Constitutional:  Negative for activity change, chills, diaphoresis and fever.   HENT:  Negative for trouble swallowing.    Eyes:  Negative for photophobia and visual disturbance.   Respiratory:  Negative for cough, chest tightness, shortness of breath and wheezing.    Cardiovascular:  Negative for chest pain, palpitations and leg swelling.   Gastrointestinal:  Negative for abdominal pain, constipation, diarrhea, nausea and vomiting.   Genitourinary:  Negative for dysuria, frequency, hematuria and urgency.   Musculoskeletal:  Negative for arthralgias, back pain and gait problem.   Skin:  Positive for color change (right 3rd toe) and wound (right 3rd toe). Negative for rash.   Neurological:  Negative for dizziness, syncope, weakness, light-headedness, numbness and headaches.   Psychiatric/Behavioral:  Negative for agitation and confusion. The patient is not nervous/anxious.    Objective:     Vital Signs (Most Recent):  Temp: 97 °F (36.1 °C) (11/25/22 1101)  Pulse: 61 (11/25/22 1101)  Resp: 18 (11/25/22 1101)  BP: 135/71 (11/25/22 1101)  SpO2: 96 % (11/25/22 1101)   Vital Signs (24h Range):  Temp:  [96.9 °F (36.1 °C)-98.2 °F (36.8 °C)] 97 °F (36.1 °C)  Pulse:  [49-62] 61  Resp:  [17-18] 18  SpO2:  [95 %-97 %] 96 %  BP: ()/(53-75) 135/71     Weight: 127 kg (280 lb)  Body mass index is 35.95 kg/m².    Intake/Output Summary (Last 24 hours) at 11/25/2022 1114  Last data filed at 11/24/2022 1700  Gross per 24 hour   Intake 600 ml   Output --   Net 600 ml        Physical Exam  Vitals and nursing note reviewed.   Constitutional:       General: He is not in acute distress.     Appearance: He is well-developed. He is obese. He is not toxic-appearing or diaphoretic.   HENT:      Head: Normocephalic and atraumatic.      Mouth/Throat:      Mouth: Mucous membranes are moist.       Pharynx: No oropharyngeal exudate.   Eyes:      Conjunctiva/sclera: Conjunctivae normal.   Cardiovascular:      Rate and Rhythm: Normal rate and regular rhythm.      Heart sounds: Normal heart sounds.   Pulmonary:      Effort: Pulmonary effort is normal. No respiratory distress.      Breath sounds: Wheezing (end expiratory) present.   Abdominal:      General: Bowel sounds are normal. There is no distension.      Palpations: Abdomen is soft.      Tenderness: There is no abdominal tenderness.   Musculoskeletal:      Cervical back: Normal range of motion and neck supple.      Right lower leg: Edema (pitting tibial) present.      Left lower leg: Edema (pitting tibial) present.      Comments: Unable to assess ROM, swelling, tenderness or infection site as patient's toe was wrapped in dressing. Dressing clean, dry and intact.   Lymphadenopathy:      Cervical: No cervical adenopathy.   Skin:     General: Skin is warm and dry.      Capillary Refill: Capillary refill takes less than 2 seconds.      Findings: Erythema (see comment) present. No rash.      Comments: During exam patient's toe was dressed. Dressing clean, dry and intact. Per pictures in patient chart - right 3rd toe with ulcer on lateral aspect, purulent drainage. Additional ulcer on ventral aspect of foot.   Neurological:      Mental Status: He is alert and oriented to person, place, and time.      Cranial Nerves: No cranial nerve deficit.      Sensory: No sensory deficit.      Coordination: Coordination normal.   Psychiatric:         Behavior: Behavior normal.         Thought Content: Thought content normal.         Judgment: Judgment normal.       Significant Labs: All pertinent labs within the past 24 hours have been reviewed.  CBC: No results for input(s): WBC, HGB, HCT, PLT in the last 48 hours.  CMP:   Recent Labs   Lab 11/24/22  0320 11/25/22  0336   * 130*   K 4.1 4.4    100   CO2 24 22*   * 331*   BUN 22* 21*   CREATININE 1.0 0.9    CALCIUM 8.4* 8.6*   PROT 6.3 6.5   ALBUMIN 2.9* 3.0*   BILITOT 0.4 0.3   ALKPHOS 80 86   AST 15 16   ALT 21 25   ANIONGAP 7* 8         Significant Imaging: I have reviewed all pertinent imaging results/findings within the past 24 hours.

## 2022-11-25 NOTE — PROGRESS NOTES
[facility] Surg  Podiatry  Progress Note    Patient Name: Billy Sheffield Miguel  MRN: [id number]  Admission Date: 11/22/2022  Hospital Length of Stay: 2 days  Attending Physician: Terrance Sierra MD  Primary Care Provider: BRAD Baker MD     Subjective:     Interval History: Patient says he feels ok today. Patient denies any fever chills nausea or vomiting. Patient is stable at time of this visit. Patient has no other pedal concerns at this time.      Scheduled Meds:   ascorbic acid (vitamin C)  500 mg Oral Daily    aspirin  81 mg Oral Daily    atorvastatin  80 mg Oral QHS    carvediloL  12.5 mg Oral BID    cefTRIAXone (ROCEPHIN) IVPB  2 g Intravenous Q24H    heparin (porcine)  5,000 Units Subcutaneous Q8H    insulin aspart U-100  3-6 Units Subcutaneous TIDWM    insulin detemir U-100  9 Units Subcutaneous BID    lisinopriL  40 mg Oral Daily     Continuous Infusions:  PRN Meds:acetaminophen, albuterol-ipratropium, bisacodyL, dextrose 10%, dextrose 10%, glucagon (human recombinant), glucose, glucose, insulin aspart U-100, melatonin, naloxone, ondansetron, oxyCODONE, oxyCODONE, polyethylene glycol, promethazine, sodium chloride 0.9%    Review of Systems   Constitutional:  Negative for activity change, chills, diaphoresis and fever.   HENT:  Negative for trouble swallowing.    Eyes:  Negative for photophobia and visual disturbance.   Respiratory:  Negative for cough, chest tightness, shortness of breath and wheezing.    Cardiovascular:  Negative for chest pain, palpitations and leg swelling.   Gastrointestinal:  Negative for abdominal pain, constipation, diarrhea, nausea and vomiting.   Genitourinary:  Negative for dysuria, frequency, hematuria and urgency.   Musculoskeletal:  Negative for arthralgias, back pain and gait problem.   Skin:  Positive for color change (right 3rd toe) and wound (right 3rd toe). Negative for rash.   Neurological:  Negative for dizziness, syncope, weakness, light-headedness,  numbness and headaches.   Psychiatric/Behavioral:  Negative for agitation and confusion. The patient is not nervous/anxious.    Objective:     Vital Signs (Most Recent):  Temp: 96.9 °F (36.1 °C) (11/24/22 1941)  Pulse: (!) 57 (11/24/22 2009)  Resp: 17 (11/24/22 1941)  BP: 129/65 (11/24/22 1941)  SpO2: 97 % (11/24/22 1941)   Vital Signs (24h Range):  Temp:  [96.9 °F (36.1 °C)-98.4 °F (36.9 °C)] 96.9 °F (36.1 °C)  Pulse:  [51-60] 57  Resp:  [17-18] 17  SpO2:  [94 %-97 %] 97 %  BP: ()/(54-75) 129/65     Weight: 127 kg (280 lb)  Body mass index is 35.95 kg/m².    Foot Exam    General  Orientation: alert and oriented to person, place, and time       Right Foot/Ankle     Inspection and Palpation  Tenderness: none   Swelling: (3rd toe)  Skin Exam: callus, drainage, dry skin, cellulitis, skin changes, abnormal color, ulcer and erythema; skin not intact     Neurovascular  Dorsalis pedis: 1+  Posterior tibial: doppler  Saphenous nerve sensation: absent  Tibial nerve sensation: absent  Superficial peroneal nerve sensation: absent  Deep peroneal nerve sensation: absent  Sural nerve sensation: absent    Comments  -Partial 5th ray amputated  -3rd toe with lateral wound down to bone, fibronecrotic wound bed, serous drainage, mild malodor. Not tender. Chronic periwound skin thickening.   -2nd metatarsal head plantar wound down to bone, fibrotic wound bed, serosanguinous drainage. Not tender.     Left Foot/Ankle      Inspection and Palpation  Tenderness: none   Swelling: none   Skin Exam: callus and dry skin; no drainage, no cellulitis, no ulcer and no erythema     Neurovascular  Dorsalis pedis: 1+  Posterior tibial: doppler  Saphenous nerve sensation: absent  Tibial nerve sensation: absent  Superficial peroneal nerve sensation: absent  Deep peroneal nerve sensation: absent  Sural nerve sensation: absent    Comments  -Partial 1st ray amputated              Laboratory:  A1C:   Recent Labs   Lab 06/08/22  0036 08/09/22  1634  11/10/22  0835   HGBA1C 10.0* 8.0* 7.1*     Blood Cultures: No results for input(s): LABBLOO in the last 48 hours.  CBC:   Recent Labs   Lab 11/22/22  1154   WBC 10.62   RBC 4.93   HGB 14.7   HCT 43.2      MCV 88   MCH 29.8   MCHC 34.0     CMP:   Recent Labs   Lab 11/24/22  0320   *   CALCIUM 8.4*   ALBUMIN 2.9*   PROT 6.3   *   K 4.1   CO2 24      BUN 22*   CREATININE 1.0   ALKPHOS 80   ALT 21   AST 15   BILITOT 0.4     CRP:   Recent Labs   Lab 11/22/22  1154   CRP 15.6*     ESR:   Recent Labs   Lab 11/22/22  1154   SEDRATE 56*     Prealbumin: No results for input(s): PREALBUMIN in the last 48 hours.  Wound Cultures:   Recent Labs   Lab 06/08/22  0206 06/08/22  1231 06/09/22  1514 11/15/22  1654   LABAERO KLEBSIELLA PNEUMONIAE ESBL  Many  *  STREPTOCOCCUS AGALACTIAE (GROUP B)  Many  Beta-hemolytic streptococci are routinely susceptible to   penicillins,cephalosporins and carbapenems.  Susceptibility testing not routinely performed  Skin josselin also present  * STREPTOCOCCUS AGALACTIAE (GROUP B)  Many  Beta-hemolytic streptococci are routinely susceptible to   penicillins,cephalosporins and carbapenems.  Susceptibility testing not routinely performed  *  KLEBSIELLA PNEUMONIAE ESBL  Few  * STREPTOCOCCUS AGALACTIAE (GROUP B)  Rare  Beta-hemolytic streptococci are routinely susceptible to   penicillins,cephalosporins and carbapenems.  Susceptibility testing not routinely performed  * STAPHYLOCOCCUS AUREUS  Many  *  PASTEURELLA MULTOCIDA   Many  Susceptibility testing not routinely performed  *     Microbiology Results (last 7 days)       Procedure Component Value Units Date/Time    Blood culture #1 **CANNOT BE ORDERED STAT** [[id number]] Collected: 11/22/22 1153    Order Status: Completed Specimen: Blood from Peripheral, Forearm, Right Updated: 11/24/22 1412     Blood Culture, Routine No Growth to date      No Growth to date      No Growth to date    Blood culture #2 **CANNOT BE ORDERED STAT**  [[id number]] Collected: 11/22/22 1153    Order Status: Completed Specimen: Blood from Peripheral, Forearm, Right Updated: 11/24/22 1412     Blood Culture, Routine No Growth to date      No Growth to date      No Growth to date          Specimen (24h ago, onward)      None              Assessment/Plan:     * Diabetic foot infection  Assessment: IDSA moderate diabetic foot infection with right 3rd toe osteomyelitis and possible 2nd MTPJ osteomyelitis. X ray unremarkable. MRI + for right 3rd middle and proximal phalanx OM. Bone culture + for Staph aureus and Pasteurella. Pedal pulses diminished with R CODY 1.1 and L VASILIY 1.2.     Plan:  -Planning for surgical management of osteomyelitis with right 3rd toe/ray amputation. Plan to take patient around 7pm tomorrow 11/25   - patient to be NPO at 10am tomorrow morning   -Antibiotic plan per primary team.   -RLE WB to heel in surgical shoe for transfers or short distances. LLE WBAT.  -Dressing changes by nursing.   -Podiatry will follow.         Misael Parsons DPM PGY-1  Podiatric Medicine & Surgery  [facility]  Secure Chat Preferred  Mobile: [phone]  Pager: [phone]

## 2022-11-25 NOTE — PLAN OF CARE
Patient AAOx4  Able to make needs known  Nurse took the time to re educate patient regarding DM II and his food choices. Patient stated that his sugar is elevated due the the dextrose in the antibiotics that he was given. Nurse explained to patient that his blood glucose levels are elevated due to poor food choices. Along with dietary meals from hospital patient is asking staff for other food in the nutrition room and has been taking multiple trips to the vending machines.   Patient stated that he has been reading and he knows that dextrose is sugar, nurse informed him of a list of food that she tried to warn patient not to consume in which had  a large amount of carbs including but not limited to whole milk, cereal, and sodas.     Nurse informed patient that she believes he would benefit from seeing a nutritionist to assist in making better food choices    Patient currently NPO due to surgery this evening    Bed is in lowest position and locked  Call bell and personal items are within reach.

## 2022-11-25 NOTE — ASSESSMENT & PLAN NOTE
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 X ray unremarkable. MRI + for right 3rd middle and proximal phalanx OM. Bone culture + for Staph aureus and Pasteurella. Pedal pulses diminished with R CODY 1.1 and L VASILIY 1.2.     Plan:  -Planning for surgical management of osteomyelitis with right 3rd toe/ray amputation. Plan to take patient around 7pm tomorrow 11/25   -Antibiotic plan per primary team.   -RLE WB to heel in surgical shoe for transfers or short distances. LLE WBAT.  -Dressing changes by nursing.   -Podiatry will follow.

## 2022-11-26 LAB
ALBUMIN SERPL BCP-MCNC: 3.2 G/DL (ref 3.5–5.2)
ALP SERPL-CCNC: 94 U/L (ref 55–135)
ALT SERPL W/O P-5'-P-CCNC: 71 U/L (ref 10–44)
ANION GAP SERPL CALC-SCNC: 10 MMOL/L (ref 8–16)
AST SERPL-CCNC: 54 U/L (ref 10–40)
BASOPHILS # BLD AUTO: 0.07 K/UL (ref 0–0.2)
BASOPHILS NFR BLD: 0.7 % (ref 0–1.9)
BILIRUB SERPL-MCNC: 0.5 MG/DL (ref 0.1–1)
BUN SERPL-MCNC: 17 MG/DL (ref 6–20)
CALCIUM SERPL-MCNC: 9 MG/DL (ref 8.7–10.5)
CHLORIDE SERPL-SCNC: 102 MMOL/L (ref 95–110)
CO2 SERPL-SCNC: 22 MMOL/L (ref 23–29)
CREAT SERPL-MCNC: 0.8 MG/DL (ref 0.5–1.4)
DIFFERENTIAL METHOD: ABNORMAL
EOSINOPHIL # BLD AUTO: 0.3 K/UL (ref 0–0.5)
EOSINOPHIL NFR BLD: 3.3 % (ref 0–8)
ERYTHROCYTE [DISTWIDTH] IN BLOOD BY AUTOMATED COUNT: 12.8 % (ref 11.5–14.5)
EST. GFR  (NO RACE VARIABLE): >60 ML/MIN/1.73 M^2
GLUCOSE SERPL-MCNC: 205 MG/DL (ref 70–110)
HCT VFR BLD AUTO: 40.3 % (ref 40–54)
HGB BLD-MCNC: 13.6 G/DL (ref 14–18)
IMM GRANULOCYTES # BLD AUTO: 0.07 K/UL (ref 0–0.04)
IMM GRANULOCYTES NFR BLD AUTO: 0.7 % (ref 0–0.5)
LYMPHOCYTES # BLD AUTO: 1.6 K/UL (ref 1–4.8)
LYMPHOCYTES NFR BLD: 15.4 % (ref 18–48)
MCH RBC QN AUTO: 29.9 PG (ref 27–31)
MCHC RBC AUTO-ENTMCNC: 33.7 G/DL (ref 32–36)
MCV RBC AUTO: 89 FL (ref 82–98)
MONOCYTES # BLD AUTO: 0.9 K/UL (ref 0.3–1)
MONOCYTES NFR BLD: 8.6 % (ref 4–15)
NEUTROPHILS # BLD AUTO: 7.2 K/UL (ref 1.8–7.7)
NEUTROPHILS NFR BLD: 71.3 % (ref 38–73)
NRBC BLD-RTO: 0 /100 WBC
PLATELET # BLD AUTO: 247 K/UL (ref 150–450)
PMV BLD AUTO: 9.8 FL (ref 9.2–12.9)
POCT GLUCOSE: 197 MG/DL (ref 70–110)
POCT GLUCOSE: 233 MG/DL (ref 70–110)
POCT GLUCOSE: 243 MG/DL (ref 70–110)
POCT GLUCOSE: 319 MG/DL (ref 70–110)
POTASSIUM SERPL-SCNC: 4.5 MMOL/L (ref 3.5–5.1)
PROT SERPL-MCNC: 7 G/DL (ref 6–8.4)
RBC # BLD AUTO: 4.55 M/UL (ref 4.6–6.2)
SODIUM SERPL-SCNC: 134 MMOL/L (ref 136–145)
WBC # BLD AUTO: 10.13 K/UL (ref 3.9–12.7)

## 2022-11-26 PROCEDURE — 11000001 HC ACUTE MED/SURG PRIVATE ROOM

## 2022-11-26 PROCEDURE — 94761 N-INVAS EAR/PLS OXIMETRY MLT: CPT

## 2022-11-26 PROCEDURE — 36415 COLL VENOUS BLD VENIPUNCTURE: CPT | Performed by: STUDENT IN AN ORGANIZED HEALTH CARE EDUCATION/TRAINING PROGRAM

## 2022-11-26 PROCEDURE — 25000003 PHARM REV CODE 250: Performed by: PODIATRIST

## 2022-11-26 PROCEDURE — 99232 PR SUBSEQUENT HOSPITAL CARE,LEVL II: ICD-10-PCS | Mod: ,,, | Performed by: STUDENT IN AN ORGANIZED HEALTH CARE EDUCATION/TRAINING PROGRAM

## 2022-11-26 PROCEDURE — 85025 COMPLETE CBC W/AUTO DIFF WBC: CPT | Performed by: STUDENT IN AN ORGANIZED HEALTH CARE EDUCATION/TRAINING PROGRAM

## 2022-11-26 PROCEDURE — 99232 SBSQ HOSP IP/OBS MODERATE 35: CPT | Mod: ,,, | Performed by: STUDENT IN AN ORGANIZED HEALTH CARE EDUCATION/TRAINING PROGRAM

## 2022-11-26 PROCEDURE — 99024 PR POST-OP FOLLOW-UP VISIT: ICD-10-PCS | Mod: ,,, | Performed by: PODIATRIST

## 2022-11-26 PROCEDURE — 63600175 PHARM REV CODE 636 W HCPCS: Performed by: PODIATRIST

## 2022-11-26 PROCEDURE — 80053 COMPREHEN METABOLIC PANEL: CPT | Performed by: STUDENT IN AN ORGANIZED HEALTH CARE EDUCATION/TRAINING PROGRAM

## 2022-11-26 PROCEDURE — 99024 POSTOP FOLLOW-UP VISIT: CPT | Mod: ,,, | Performed by: PODIATRIST

## 2022-11-26 RX ADMIN — CARVEDILOL 6.25 MG: 6.25 TABLET, FILM COATED ORAL at 09:11

## 2022-11-26 RX ADMIN — ASPIRIN 81 MG: 81 TABLET, COATED ORAL at 09:11

## 2022-11-26 RX ADMIN — HEPARIN SODIUM 5000 UNITS: 5000 INJECTION INTRAVENOUS; SUBCUTANEOUS at 09:11

## 2022-11-26 RX ADMIN — INSULIN ASPART 10 UNITS: 100 INJECTION, SOLUTION INTRAVENOUS; SUBCUTANEOUS at 09:11

## 2022-11-26 RX ADMIN — OXYCODONE HYDROCHLORIDE AND ACETAMINOPHEN 500 MG: 500 TABLET ORAL at 09:11

## 2022-11-26 RX ADMIN — HEPARIN SODIUM 5000 UNITS: 5000 INJECTION INTRAVENOUS; SUBCUTANEOUS at 04:11

## 2022-11-26 RX ADMIN — ATORVASTATIN CALCIUM 80 MG: 40 TABLET, FILM COATED ORAL at 09:11

## 2022-11-26 RX ADMIN — LISINOPRIL 40 MG: 20 TABLET ORAL at 09:11

## 2022-11-26 RX ADMIN — INSULIN ASPART 1 UNITS: 100 INJECTION, SOLUTION INTRAVENOUS; SUBCUTANEOUS at 09:11

## 2022-11-26 RX ADMIN — INSULIN ASPART 10 UNITS: 100 INJECTION, SOLUTION INTRAVENOUS; SUBCUTANEOUS at 01:11

## 2022-11-26 RX ADMIN — INSULIN ASPART 10 UNITS: 100 INJECTION, SOLUTION INTRAVENOUS; SUBCUTANEOUS at 06:11

## 2022-11-26 RX ADMIN — INSULIN ASPART 4 UNITS: 100 INJECTION, SOLUTION INTRAVENOUS; SUBCUTANEOUS at 06:11

## 2022-11-26 NOTE — PLAN OF CARE
Problem: Adult Inpatient Plan of Care  Goal: Plan of Care Review  11/26/2022 0408 by Divina Hernandez RN  Outcome: Ongoing, Progressing  11/26/2022 0407 by Divina Hernnadez RN  Outcome: Ongoing, Progressing  Goal: Patient-Specific Goal (Individualized)  11/26/2022 0408 by Divina Hernandez RN  Outcome: Ongoing, Progressing  11/26/2022 0407 by Divina Hernandez RN  Outcome: Ongoing, Progressing  Goal: Absence of Hospital-Acquired Illness or Injury  11/26/2022 0408 by Divina Hernandez RN  Outcome: Ongoing, Progressing  11/26/2022 0407 by Divina Hernandez RN  Outcome: Ongoing, Progressing  Goal: Optimal Comfort and Wellbeing  11/26/2022 0408 by Divina Hernandez RN  Outcome: Ongoing, Progressing  11/26/2022 0407 by Divina Hernandez RN  Outcome: Ongoing, Progressing  Goal: Readiness for Transition of Care  11/26/2022 0408 by Divina Hernandez RN  Outcome: Ongoing, Progressing  11/26/2022 0407 by Divina Hernandez RN  Outcome: Ongoing, Progressing     Problem: Infection  Goal: Absence of Infection Signs and Symptoms  11/26/2022 0408 by Divina Hernandez RN  Outcome: Ongoing, Progressing  11/26/2022 0407 by Divina Hernandez RN  Outcome: Ongoing, Progressing

## 2022-11-26 NOTE — PLAN OF CARE
Problem: Adult Inpatient Plan of Care  Goal: Plan of Care Review  Outcome: Ongoing, Progressing  Goal: Patient-Specific Goal (Individualized)  Outcome: Ongoing, Progressing  Goal: Absence of Hospital-Acquired Illness or Injury  Outcome: Ongoing, Progressing  Goal: Optimal Comfort and Wellbeing  Outcome: Ongoing, Progressing  Goal: Readiness for Transition of Care  Outcome: Ongoing, Progressing     Problem: Infection  Goal: Absence of Infection Signs and Symptoms  Outcome: Ongoing, Progressing     Problem: Diabetes Comorbidity  Goal: Blood Glucose Level Within Targeted Range  Outcome: Ongoing, Progressing     Problem: Impaired Wound Healing  Goal: Optimal Wound Healing  Outcome: Ongoing, Progressing

## 2022-11-26 NOTE — OP NOTE
11/25/2022    Surgeon Germain    No assist     Preop diagnosis osteomyelitis necrotic bone 3rd toe right foot.      Postop diagnosis same    Pathology bone left 3rd toe for permanent, bone left 3rd toe culture     Procedure amputation 3rd toe right foot.        Anesthesia local with monitored  Anesthesia care    Hemostasis anatomic dissection pneumatic ankle tourniquet right    Estimated blood loss 3 mL     Materials 0 Prolene    Injections 10 mL 1% lidocaine plain 0.5% Marcaine plain mixed 50 50     Complications none apparent    Condition stable     Procedure in detail:    Under mild sedation the patient was brought in the operating room placed on the operating table in spine position.  Time-out was performed all patient identifiers site markings and procedures noted to be in agreement all present.  Following IV sedation local anesthesia was obtained about the 3rd ray the right foot utilizing local injection consisting of a total of 10 mL of a one-to-one mixture of 1% lidocaine plain and 0.5% Marcaine plain.  Right foot was then scrubbed prepped and draped in the usual aseptic manner.  Pneumatic ankle tourniquet was inflated without exsanguination.      This time 2 converging semielliptical longitudinal incisions were made around the base of the 3rd digit extending over the dorsal and plantar aspects of the 3rd MTPJ.  Third toe was removed in total pieces were sent for pathology and culture as mentioned above.    The wound was irrigated with copious amounts sterile normal saline.  Three sutures were used to reapproximate the skin simply to maintain dimension and facilitate future closure when appropriate.      Wound was packed open with Adaptic Kerlix dressed with Kerlix and an Ace bandage foot and ankle neutral position.  Prior to dressing application pneumatic ankle tourniquet was deflated to verify adequate hemostasis which was done.      Patient appeared to tolerate the procedure and anesthesia well he was  transferred to recovery with vital signs stable and vascular status intact to all toes the right foot which remained.      Following a period of postoperative monitoring he will be readmitted to his room here at [facility] followed by our service until his discharge thereafter.

## 2022-11-26 NOTE — CARE UPDATE
Care Update:     No acute events overnight. Patient on the POSS Unit in room 603/603 A. Blood glucose stable. BG at goal on current insulin regimen (SSI, prandial, and basal insulin ). Steroid use- None. 1 Day Post-Op Of note, patient snacking throughout the day yesterday prior to being made NPO. Discussed the importance of limiting snacking.   Renal function- Normal   Vasopressors-  None       Diet diabetic [facility]; 2000 Calorie     POCT Glucose   Date Value Ref Range Status   11/25/2022 152 (H) 70 - 110 mg/dL Final   11/25/2022 164 (H) 70 - 110 mg/dL Final   11/25/2022 340 (H) 70 - 110 mg/dL Final   11/25/2022 379 (H) 70 - 110 mg/dL Final   11/25/2022 270 (H) 70 - 110 mg/dL Final   11/24/2022 346 (H) 70 - 110 mg/dL Final   11/24/2022 247 (H) 70 - 110 mg/dL Final   11/24/2022 311 (H) 70 - 110 mg/dL Final   11/24/2022 203 (H) 70 - 110 mg/dL Final   11/23/2022 188 (H) 70 - 110 mg/dL Final   11/23/2022 327 (H) 70 - 110 mg/dL Final   11/23/2022 135 (H) 70 - 110 mg/dL Final   11/23/2022 151 (H) 70 - 110 mg/dL Final     Lab Results   Component Value Date    HGBA1C 7.1 (H) 11/10/2022       Endocrinology consulted for BG management.   BG goal 140-180    Diabetes Medications               dapagliflozin (FARXIGA) 5 mg Tab tablet Take 1 tablet (5 mg total) by mouth once daily.    insulin (LANTUS SOLOSTAR U-100 INSULIN) glargine 100 units/mL SubQ pen Inject 50 Units into the skin once daily. USE AS BACK UP INSULIN ONLY - EMERGENCY USE IF YOUR ARE OFF OF YOUR INSULIN PUMP    insulin lispro (HUMALOG U-100 INSULIN) 100 unit/mL injection Inject 100 Units into the skin continuous. Gives via insulin pump only. Do not Directly inject.    metFORMIN (GLUCOPHAGE) 1000 MG tablet Take 1 tablet (1,000 mg total) by mouth 2 (two) times daily with meals.    TRULICITY 1.5 mg/0.5 mL pen injector Inject 1.5 mg into the skin every 7 days.          Hospital Medications    BG checks AC/HS           glucagon (human recombinant) injection 1  mg 1 mg, Intramuscular, As needed (PRN), Turn patient on their side, give IM, and NOTIFY MD IMMEDIATELY.<BR><BR>Feed the patient as soon as patient awakens and is able to swallow.    glucose chewable tablet 16 g 16 g, Oral, As needed (PRN), (16 grams = #  four 4gm glucose tablets)    glucose chewable tablet 24 g 24 g, Oral, As needed (PRN), (24 grams = # six 4gm glucose tablets)    insulin aspart U-100 pen 0-5 Units 0-5 Units, Subcutaneous, Before meals &amp; nightly PRN, **LOW CORRECTION DOSE**<BR>Blood Glucose<BR>mg/dL                  Pre-meal                2200<BR>151-200                0 unit                      0 unit<BR>201-250                2 units                    1 unit<BR>251-300                3 units                    1 unit<BR>301-350                4 units                    2 units<BR>&gt;350                     5 units                    3 units<BR>Administer subcutaneously if needed at times designated by monitoring schedule. <BR>DO NOT HOLD correction dose insulin for patients who are  NPO.<BR>&quot;HIGH ALERT MEDICATION&quot; - Administer with meals or TF/TPN.    insulin aspart U-100 pen 10 Units 10 Units, Subcutaneous, 3 times daily with meals, Administer subcutaneously with meals. HOLD prandial (mealtime) insulin if patient is NPO, unable to eat, or if Blood Glucose less than 70 mg/dL.<BR><BR>If patient ate prior to BG check, administer scheduled Novolog only (do not cover with correction dose at this time).<BR>&quot;HIGH ALERT MEDICATION&quot; - Administer with meals or TF/TPN.    insulin detemir U-100 pen 15 Units 15 Units, Subcutaneous, 2 times daily, DO NOT HOLD basal insulin when patient is NPO.<BR>If  Blood Glucose is less than 100 mg/dL at BEDTIME, give 16 grams (four glucose tablets) X 1 dose. Recheck BG in 30 minutes and call MD for insulin dose adjustments prior to administering insulin detemir (Levemir).<BR>&quot;HIGH ALERT MEDICATION&quot;              ** Please notify  Endocrine for any change and/or advance in diet**  ** Please call Endocrine for any BG related issues **    Discharge Planning:   TBD. Please notify endocrinology prior to discharge.      Kenneth Hughes DNP, FNP-C  Department of Endocrinology  Inpatient Glycemic Management

## 2022-11-26 NOTE — NURSING TRANSFER
Nursing Transfer Note      11/25/2022     Reason patient is being transferred: Post Op    Transfer To: 603, report given to PAPI Murcia    Transfer via bed    Transfer with telemetry monitor    Transported by patient transport    Medicines sent: none    Any special needs or follow-up needed: none    Chart send with patient: Yes    Notified: spouse    Patient reassessed at: 11-25-22

## 2022-11-26 NOTE — ASSESSMENT & PLAN NOTE
Right foot pain  Pt with 2 previous toe amputations for similar infections - left great toe and right 5th toe - most recent 06/2022. Previous cultures 06/2022 significant for strep agalactiae (GBS) and prevotella bivia. Cultures by podiatry clinic 11/15/2022 significant for staph aureus and pasturella multocida. ESR & CRP elevated. WBC without leukocytosis  - current blood cultures NGTD  - podiatry consulted, will follow recs. S/p amputation of 3rd R toe on 11/25.  - Was on ceftriaxone from pre-ampuation bone bx, discontinued following amputation. Will discuss with podiatry if they felt they achieved clean margins.  - keep foot elevated and dry  - pt currently pain free, pain medication prn if needed   - hold home diabetic medications

## 2022-11-26 NOTE — TRANSFER OF CARE
"Anesthesia Transfer of Care Note    Patient: Billy Rivera    Procedure(s) Performed: Procedure(s) (LRB):  AMPUTATION, TOE (Right)    Patient location: PACU    Anesthesia Type: general    Transport from OR: Transported from OR on 6-10 L/min O2 by face mask with adequate spontaneous ventilation    Post pain: adequate analgesia    Post assessment: no apparent anesthetic complications and tolerated procedure well    Post vital signs: stable    Level of consciousness: awake    Nausea/Vomiting: no nausea/vomiting    Complications: none    Transfer of care protocol was followed      Last vitals:   Visit Vitals  /68 (BP Location: Right arm, Patient Position: Lying)   Pulse (!) 54   Temp 36 °C (96.8 °F) (Temporal)   Resp 18   Ht 6' 2" (1.88 m)   Wt 127 kg (280 lb)   SpO2 100%   BMI 35.95 kg/m²     "

## 2022-11-26 NOTE — BRIEF OP NOTE
[facility] - Med Surg  Brief Operative Note    SUMMARY     Surgery Date: 11/25/2022     Surgeon(s) and Role:     * Natalia Groves DPM - Primary    Assisting Surgeon: None    Pre-op Diagnosis:  Subacute osteomyelitis of right foot [M86.271]    Post-op Diagnosis:  Post-Op Diagnosis Codes:     * Subacute osteomyelitis of right foot [M86.271]    Procedure(s) (LRB):  AMPUTATION, TOE (Right)    Anesthesia: Local MAC    Operative Findings: necrotic bone 3rd toe right.  Visibly intact margin distal 3rd metatarsal right    Estimated Blood Loss: * No values recorded between 11/25/2022  7:59 PM and 11/25/2022  8:40 PM *    Estimated Blood Loss has not been documented. EBL = 3mL.         Specimens:   Specimen (24h ago, onward)       Start     Ordered    11/25/22 2034  Specimen to Pathology, Surgery Orthopedics  Once        Comments: Pre-op Diagnosis: Subacute osteomyelitis of right foot [M86.271]Procedure(s):AMPUTATION, TOE Number of specimens: 1Name of specimens: Right 3rd Toe- Gross Only     References:    Click here for ordering Quick Tip   Question Answer Comment   Procedure Type: Orthopedics    Which provider would you like to cc? NATALIA GROVES    Release to patient Immediate        11/25/22 2034 11/25/22 2023  Specimen to Pathology, Surgery Orthopedics  Once        Comments: Pre-op Diagnosis: Subacute osteomyelitis of right foot [M86.271]Procedure(s):AMPUTATION, TOE Number of specimens: 1Name of specimens: 1-Bone, Right 3rd Toe- Permanent     References:    Click here for ordering Quick Tip   Question Answer Comment   Procedure Type: Orthopedics    Which provider would you like to cc? NATALIA GROVES    Release to patient Immediate        11/25/22 2024                    [id number]

## 2022-11-26 NOTE — PROGRESS NOTES
[facility] Medicine  Progress Note    Patient Name: Billy Rivera  MRN: [id number]  Patient Class: IP- Inpatient   Admission Date: 11/22/2022  Length of Stay: 4 days  Attending Physician: Terrance Sierra MD  Primary Care Provider: BRAD Baker MD        Subjective:     Principal Problem:Diabetic foot infection        HPI:  Billy Rivera is a 57 y.o. male with a history of type 2 diabetes, CAD, HTN, and colon cancer admitted to [facility] with diabetic foot infection to his right 3rd toe. He was referred from his podiatrist, Dr. Melara, due to a right 3rd toe ulcer with infection that will likely require amputation. Endorses that toe is malodorous and has some purulent drainage. Endorses associated peripheral neuropathy. Denies associated toe pain, ankle or leg pain. He has had two previous amputations (left great toe and right 5th toe) due to similar conditions. Per his podiatrist, he was taking Doxy and Cipro since 11/15/2022 for the current right 3rd toe infection and per patient he has been started on IV abx upon arrival to hospital. He reports that his toe infections began around 3 years ago after stepping on piece of glass barefoot. Denies associated headache, fever, chills, CP, SOB, N/V/D, abdominal pain, urinary symptoms, numbness or tingling.       ED: VSS, AF. WBC 10, lactic acid elevated to 2.3. ESR & CRP mildly elevated, other labs unremarkable. Blood cultures pending. Right foot XR showing DJD without acute fracture or bone destruction. Given 1 L IVF and started on IV Vanc and Cefepime.      Overview/Hospital Course:  Status post R 3rd toe amputation on 11/25.      Interval History:   No acute events overnight. Patient went for amputation yesterday. No issues overnight afterwards.     Review of Systems   Constitutional:  Negative for activity change, chills, diaphoresis and fever.   HENT:  Negative for trouble swallowing.    Eyes:  Negative for photophobia and visual  disturbance.   Respiratory:  Negative for cough, chest tightness, shortness of breath and wheezing.    Cardiovascular:  Negative for chest pain, palpitations and leg swelling.   Gastrointestinal:  Negative for abdominal pain, constipation, diarrhea, nausea and vomiting.   Genitourinary:  Negative for dysuria, frequency, hematuria and urgency.   Musculoskeletal:  Negative for arthralgias, back pain and gait problem.   Skin:  Positive for wound (s/p amputation R 3rd toe). Negative for rash.   Neurological:  Negative for dizziness, syncope, weakness, light-headedness, numbness and headaches.   Psychiatric/Behavioral:  Negative for agitation and confusion. The patient is not nervous/anxious.    Objective:     Vital Signs (Most Recent):  Temp: 97.8 °F (36.6 °C) (11/26/22 0721)  Pulse: (!) 56 (11/26/22 0838)  Resp: 18 (11/26/22 0721)  BP: (!) 143/71 (11/26/22 0721)  SpO2: 95 % (11/26/22 0721)   Vital Signs (24h Range):  Temp:  [96.8 °F (36 °C)-97.8 °F (36.6 °C)] 97.8 °F (36.6 °C)  Pulse:  [47-63] 56  Resp:  [13-19] 18  SpO2:  [95 %-100 %] 95 %  BP: (115-156)/(57-81) 143/71     Weight: 127 kg (280 lb)  Body mass index is 35.95 kg/m².    Intake/Output Summary (Last 24 hours) at 11/26/2022 0958  Last data filed at 11/25/2022 2040  Gross per 24 hour   Intake 250 ml   Output --   Net 250 ml        Physical Exam  Vitals and nursing note reviewed.   Constitutional:       General: He is not in acute distress.     Appearance: He is well-developed. He is obese. He is not toxic-appearing or diaphoretic.   HENT:      Head: Normocephalic and atraumatic.      Mouth/Throat:      Mouth: Mucous membranes are moist.      Pharynx: No oropharyngeal exudate.   Eyes:      Conjunctiva/sclera: Conjunctivae normal.   Cardiovascular:      Rate and Rhythm: Normal rate and regular rhythm.      Heart sounds: Normal heart sounds.   Pulmonary:      Effort: Pulmonary effort is normal. No respiratory distress.      Breath sounds: No wheezing.    Abdominal:      General: Bowel sounds are normal. There is no distension.      Palpations: Abdomen is soft.      Tenderness: There is no abdominal tenderness.   Musculoskeletal:      Cervical back: Normal range of motion and neck supple.      Right lower leg: Edema (pitting tibial) present.      Left lower leg: Edema (pitting tibial) present.      Comments: S/p amputation of 3rd R toe. Dressing clean, dry and intact.   Lymphadenopathy:      Cervical: No cervical adenopathy.   Skin:     General: Skin is warm and dry.      Capillary Refill: Capillary refill takes less than 2 seconds.      Findings: Erythema (see comment) present. No rash.      Comments: S/p amputation of 3rd R toe   Neurological:      Mental Status: He is alert and oriented to person, place, and time.      Cranial Nerves: No cranial nerve deficit.      Sensory: No sensory deficit.      Coordination: Coordination normal.   Psychiatric:         Behavior: Behavior normal.         Thought Content: Thought content normal.         Judgment: Judgment normal.       Significant Labs: All pertinent labs within the past 24 hours have been reviewed.  CBC:   Recent Labs   Lab 11/26/22  0734   WBC 10.13   HGB 13.6*   HCT 40.3        CMP:   Recent Labs   Lab 11/25/22  0336 11/26/22  0734   * 134*   K 4.4 4.5    102   CO2 22* 22*   * 205*   BUN 21* 17   CREATININE 0.9 0.8   CALCIUM 8.6* 9.0   PROT 6.5 7.0   ALBUMIN 3.0* 3.2*   BILITOT 0.3 0.5   ALKPHOS 86 94   AST 16 54*   ALT 25 71*   ANIONGAP 8 10         Significant Imaging: I have reviewed all pertinent imaging results/findings within the past 24 hours.      Assessment/Plan:      * Diabetic foot infection  Right foot pain  Pt with 2 previous toe amputations for similar infections - left great toe and right 5th toe - most recent 06/2022. Previous cultures 06/2022 significant for strep agalactiae (GBS) and prevotella bivia. Cultures by podiatry clinic 11/15/2022 significant for staph  aureus and pasturella multocida. ESR & CRP elevated. WBC without leukocytosis  - current blood cultures NGTD  - podiatry consulted, will follow recs. S/p amputation of 3rd R toe on 11/25.  - Was on ceftriaxone from pre-ampuation bone bx, discontinued following amputation. Will discuss with podiatry if they felt they achieved clean margins.  - keep foot elevated and dry  - pt currently pain free, pain medication prn if needed   - hold home diabetic medications    Right foot pain  See diabetic foot infection      CAD (coronary artery disease)  - continue HTN management   - ekg prn for chest pain    Diabetic foot ulcer associated with type 2 diabetes mellitus  Patient's FSGs are controlled on current medication regimen.  Last A1c reviewed-   Lab Results   Component Value Date    HGBA1C 7.1 (H) 11/10/2022     Most recent fingerstick glucose reviewed- No results for input(s): POCTGLUCOSE in the last 24 hours.  Current correctional scale  Low  Maintain anti-hyperglycemic dose as follows-     Hold Oral hypoglycemics while patient is in the hospital.  Antihyperglycemics (From admission, onward)    Start     Stop Route Frequency Ordered    11/22/22 1645  insulin aspart U-100 pen 3 Units         -- SubQ 3 times daily with meals 11/22/22 1422 11/22/22 1530  insulin detemir U-100 pen 9 Units         -- SubQ Daily 11/22/22 1422        Hold Oral hypoglycemics while patient is in the hospital.    Hypertension associated with diabetes  - Continue coreg 6.25 mg BID  - continue lisinopril 40 mg daily      VTE Risk Mitigation (From admission, onward)         Ordered     heparin (porcine) injection 5,000 Units  Every 8 hours         11/22/22 1422     IP VTE HIGH RISK PATIENT  Once         11/22/22 1422     Place sequential compression device  Until discontinued         11/22/22 1422                Discharge Planning   DARVIN: 11/28/2022     Code Status: Full Code   Is the patient medically ready for discharge?: No    Reason for patient  still in hospital (select all that apply): Patient trending condition  Discharge Plan A: Home Health   Discharge Delays: None known at this time              Terrance Sierra MD  Department of Hospital Medicine   [facility]

## 2022-11-26 NOTE — SUBJECTIVE & OBJECTIVE
Interval History:   No acute events overnight. Patient went for amputation yesterday. No issues overnight afterwards.     Review of Systems   Constitutional:  Negative for activity change, chills, diaphoresis and fever.   HENT:  Negative for trouble swallowing.    Eyes:  Negative for photophobia and visual disturbance.   Respiratory:  Negative for cough, chest tightness, shortness of breath and wheezing.    Cardiovascular:  Negative for chest pain, palpitations and leg swelling.   Gastrointestinal:  Negative for abdominal pain, constipation, diarrhea, nausea and vomiting.   Genitourinary:  Negative for dysuria, frequency, hematuria and urgency.   Musculoskeletal:  Negative for arthralgias, back pain and gait problem.   Skin:  Positive for wound (s/p amputation R 3rd toe). Negative for rash.   Neurological:  Negative for dizziness, syncope, weakness, light-headedness, numbness and headaches.   Psychiatric/Behavioral:  Negative for agitation and confusion. The patient is not nervous/anxious.    Objective:     Vital Signs (Most Recent):  Temp: 97.8 °F (36.6 °C) (11/26/22 0721)  Pulse: (!) 56 (11/26/22 0838)  Resp: 18 (11/26/22 0721)  BP: (!) 143/71 (11/26/22 0721)  SpO2: 95 % (11/26/22 0721)   Vital Signs (24h Range):  Temp:  [96.8 °F (36 °C)-97.8 °F (36.6 °C)] 97.8 °F (36.6 °C)  Pulse:  [47-63] 56  Resp:  [13-19] 18  SpO2:  [95 %-100 %] 95 %  BP: (115-156)/(57-81) 143/71     Weight: 127 kg (280 lb)  Body mass index is 35.95 kg/m².    Intake/Output Summary (Last 24 hours) at 11/26/2022 0958  Last data filed at 11/25/2022 2040  Gross per 24 hour   Intake 250 ml   Output --   Net 250 ml        Physical Exam  Vitals and nursing note reviewed.   Constitutional:       General: He is not in acute distress.     Appearance: He is well-developed. He is obese. He is not toxic-appearing or diaphoretic.   HENT:      Head: Normocephalic and atraumatic.      Mouth/Throat:      Mouth: Mucous membranes are moist.      Pharynx: No  oropharyngeal exudate.   Eyes:      Conjunctiva/sclera: Conjunctivae normal.   Cardiovascular:      Rate and Rhythm: Normal rate and regular rhythm.      Heart sounds: Normal heart sounds.   Pulmonary:      Effort: Pulmonary effort is normal. No respiratory distress.      Breath sounds: No wheezing.   Abdominal:      General: Bowel sounds are normal. There is no distension.      Palpations: Abdomen is soft.      Tenderness: There is no abdominal tenderness.   Musculoskeletal:      Cervical back: Normal range of motion and neck supple.      Right lower leg: Edema (pitting tibial) present.      Left lower leg: Edema (pitting tibial) present.      Comments: S/p amputation of 3rd R toe. Dressing clean, dry and intact.   Lymphadenopathy:      Cervical: No cervical adenopathy.   Skin:     General: Skin is warm and dry.      Capillary Refill: Capillary refill takes less than 2 seconds.      Findings: Erythema (see comment) present. No rash.      Comments: S/p amputation of 3rd R toe   Neurological:      Mental Status: He is alert and oriented to person, place, and time.      Cranial Nerves: No cranial nerve deficit.      Sensory: No sensory deficit.      Coordination: Coordination normal.   Psychiatric:         Behavior: Behavior normal.         Thought Content: Thought content normal.         Judgment: Judgment normal.       Significant Labs: All pertinent labs within the past 24 hours have been reviewed.  CBC:   Recent Labs   Lab 11/26/22  0734   WBC 10.13   HGB 13.6*   HCT 40.3        CMP:   Recent Labs   Lab 11/25/22  0336 11/26/22  0734   * 134*   K 4.4 4.5    102   CO2 22* 22*   * 205*   BUN 21* 17   CREATININE 0.9 0.8   CALCIUM 8.6* 9.0   PROT 6.5 7.0   ALBUMIN 3.0* 3.2*   BILITOT 0.3 0.5   ALKPHOS 86 94   AST 16 54*   ALT 25 71*   ANIONGAP 8 10         Significant Imaging: I have reviewed all pertinent imaging results/findings within the past 24 hours.

## 2022-11-26 NOTE — ANESTHESIA POSTPROCEDURE EVALUATION
Anesthesia Post Evaluation    Patient: Billy Rivera    Procedure(s) Performed: Procedure(s) (LRB):  AMPUTATION, TOE (Right)    Final Anesthesia Type: general      Patient location during evaluation: PACU  Patient participation: Yes- Able to Participate  Level of consciousness: awake and alert  Post-procedure vital signs: reviewed and stable  Pain management: adequate  Airway patency: patent    PONV status at discharge: No PONV  Anesthetic complications: no      Cardiovascular status: blood pressure returned to baseline  Respiratory status: unassisted  Hydration status: euvolemic  Follow-up not needed.          Vitals Value Taken Time   /72 11/25/22 2218   Temp 36.3 °C (97.4 °F) 11/25/22 2218   Pulse 53 11/25/22 2218   Resp 16 11/25/22 2218   SpO2 96 % 11/25/22 2218         Event Time   Out of Recovery 11/25/2022 21:00:00         Pain/Gabriel Score: Gabriel Score: 10 (11/25/2022  9:00 PM)

## 2022-11-27 LAB
BACTERIA BLD CULT: NORMAL
BACTERIA BLD CULT: NORMAL
POCT GLUCOSE: 184 MG/DL (ref 70–110)
POCT GLUCOSE: 200 MG/DL (ref 70–110)
POCT GLUCOSE: 210 MG/DL (ref 70–110)
POCT GLUCOSE: 242 MG/DL (ref 70–110)

## 2022-11-27 PROCEDURE — 99223 1ST HOSP IP/OBS HIGH 75: CPT | Mod: ,,, | Performed by: INTERNAL MEDICINE

## 2022-11-27 PROCEDURE — 94761 N-INVAS EAR/PLS OXIMETRY MLT: CPT

## 2022-11-27 PROCEDURE — 99024 PR POST-OP FOLLOW-UP VISIT: ICD-10-PCS | Mod: ,,, | Performed by: PODIATRIST

## 2022-11-27 PROCEDURE — 99232 SBSQ HOSP IP/OBS MODERATE 35: CPT | Mod: ,,, | Performed by: NURSE PRACTITIONER

## 2022-11-27 PROCEDURE — 94799 UNLISTED PULMONARY SVC/PX: CPT

## 2022-11-27 PROCEDURE — 99024 POSTOP FOLLOW-UP VISIT: CPT | Mod: ,,, | Performed by: PODIATRIST

## 2022-11-27 PROCEDURE — 99223 PR INITIAL HOSPITAL CARE,LEVL III: ICD-10-PCS | Mod: ,,, | Performed by: INTERNAL MEDICINE

## 2022-11-27 PROCEDURE — 25000003 PHARM REV CODE 250: Performed by: PODIATRIST

## 2022-11-27 PROCEDURE — 99900035 HC TECH TIME PER 15 MIN (STAT)

## 2022-11-27 PROCEDURE — 99499 UNLISTED E&M SERVICE: CPT | Mod: ,,, | Performed by: PHYSICIAN ASSISTANT

## 2022-11-27 PROCEDURE — 99232 SBSQ HOSP IP/OBS MODERATE 35: CPT | Mod: GT,,, | Performed by: INTERNAL MEDICINE

## 2022-11-27 PROCEDURE — 99499 NO LOS: ICD-10-PCS | Mod: ,,, | Performed by: PHYSICIAN ASSISTANT

## 2022-11-27 PROCEDURE — 63600175 PHARM REV CODE 636 W HCPCS: Performed by: NURSE PRACTITIONER

## 2022-11-27 PROCEDURE — 11000001 HC ACUTE MED/SURG PRIVATE ROOM

## 2022-11-27 PROCEDURE — 99232 PR SUBSEQUENT HOSPITAL CARE,LEVL II: ICD-10-PCS | Mod: ,,, | Performed by: NURSE PRACTITIONER

## 2022-11-27 PROCEDURE — 99232 PR SUBSEQUENT HOSPITAL CARE,LEVL II: ICD-10-PCS | Mod: GT,,, | Performed by: INTERNAL MEDICINE

## 2022-11-27 PROCEDURE — 63600175 PHARM REV CODE 636 W HCPCS: Performed by: PHYSICIAN ASSISTANT

## 2022-11-27 PROCEDURE — 63600175 PHARM REV CODE 636 W HCPCS: Performed by: PODIATRIST

## 2022-11-27 RX ORDER — INSULIN ASPART 100 [IU]/ML
12 INJECTION, SOLUTION INTRAVENOUS; SUBCUTANEOUS
Status: DISCONTINUED | OUTPATIENT
Start: 2022-11-27 | End: 2022-11-28

## 2022-11-27 RX ORDER — INSULIN ASPART 100 [IU]/ML
1-10 INJECTION, SOLUTION INTRAVENOUS; SUBCUTANEOUS
Status: DISCONTINUED | OUTPATIENT
Start: 2022-11-27 | End: 2022-11-28 | Stop reason: HOSPADM

## 2022-11-27 RX ADMIN — CARVEDILOL 6.25 MG: 6.25 TABLET, FILM COATED ORAL at 08:11

## 2022-11-27 RX ADMIN — INSULIN ASPART 2 UNITS: 100 INJECTION, SOLUTION INTRAVENOUS; SUBCUTANEOUS at 08:11

## 2022-11-27 RX ADMIN — ATORVASTATIN CALCIUM 80 MG: 40 TABLET, FILM COATED ORAL at 09:11

## 2022-11-27 RX ADMIN — INSULIN ASPART 2 UNITS: 100 INJECTION, SOLUTION INTRAVENOUS; SUBCUTANEOUS at 12:11

## 2022-11-27 RX ADMIN — HEPARIN SODIUM 5000 UNITS: 5000 INJECTION INTRAVENOUS; SUBCUTANEOUS at 05:11

## 2022-11-27 RX ADMIN — LISINOPRIL 40 MG: 20 TABLET ORAL at 08:11

## 2022-11-27 RX ADMIN — INSULIN ASPART 12 UNITS: 100 INJECTION, SOLUTION INTRAVENOUS; SUBCUTANEOUS at 06:11

## 2022-11-27 RX ADMIN — INSULIN ASPART 10 UNITS: 100 INJECTION, SOLUTION INTRAVENOUS; SUBCUTANEOUS at 08:11

## 2022-11-27 RX ADMIN — INSULIN ASPART 2 UNITS: 100 INJECTION, SOLUTION INTRAVENOUS; SUBCUTANEOUS at 09:11

## 2022-11-27 RX ADMIN — CEFTRIAXONE 2 G: 2 INJECTION, POWDER, FOR SOLUTION INTRAMUSCULAR; INTRAVENOUS at 02:11

## 2022-11-27 RX ADMIN — INSULIN ASPART 2 UNITS: 100 INJECTION, SOLUTION INTRAVENOUS; SUBCUTANEOUS at 06:11

## 2022-11-27 RX ADMIN — ASPIRIN 81 MG: 81 TABLET, COATED ORAL at 08:11

## 2022-11-27 RX ADMIN — OXYCODONE HYDROCHLORIDE AND ACETAMINOPHEN 500 MG: 500 TABLET ORAL at 08:11

## 2022-11-27 RX ADMIN — INSULIN ASPART 12 UNITS: 100 INJECTION, SOLUTION INTRAVENOUS; SUBCUTANEOUS at 12:11

## 2022-11-27 RX ADMIN — HEPARIN SODIUM 5000 UNITS: 5000 INJECTION INTRAVENOUS; SUBCUTANEOUS at 02:11

## 2022-11-27 NOTE — ASSESSMENT & PLAN NOTE
-s/p right 3rd toe amputation with Podiatry  - Intraop cultures pending  -RLE WB to heel in surgical shoe for transfers or short distances only. LLE WBAT.  -Dressing changes by nursing ordered per Podiatry

## 2022-11-27 NOTE — PLAN OF CARE
POC reviewed with pt. AAO x4.  Pt remained free from falls. IV abx administered. Questions and concerns addressed. Pt progressing towards goals. Will continue to monitor. See flow sheets for full assessment and VS

## 2022-11-27 NOTE — ASSESSMENT & PLAN NOTE
ID consult received. Chart being reviewed. Full consult note with recommendations to follow.      In the interim, please call or secure chat with any questions.    Thank you,  Cielo Almazan PA-C  ID NANCY Spectra: [id number]

## 2022-11-27 NOTE — PROGRESS NOTES
"[facility] - [facility] Surg  Endocrinology  Progress Note    Admit Date: 2022     Reason for Consult: Management of T2DM, Hyperglycemia      Diabetes diagnosis year:   "Around 39 years old"     Home Diabetes Medications:    Metformin 1000 mg BID; Farxiga 5 mg and Trulicity 1.5 mg weekly.   (Pump not available to review settings at time of consult; settings taken from previous note).   Medtronic 770G  Pump Settings:  Basal 1.9 units/hr  ICR: 1:56 (patient uses pre-set bolus parameters 5 units for snack; 10 units for small meals; 15 units for large meals)  ISF 1:25  Target B-120 mg/dL  IOB: 4 hours     How often checking glucose at home? 1-3 x day   BG readings on regimen: 100-200's  Hypoglycemia on the regimen?  No  Missed doses on regimen?  No     Diabetes Complications include:     Hyperglycemia, Diabetic retinopathy , Foot ulcer. Foot wound,   and Other skin ulcer     Complicating diabetes co morbidities:   History of MI and MICHAELA, CAD, HTN, HLD,         HPI:Billy Rivera is a 57 y.o. male with a history of type 2 diabetes, CAD, HTN, and colon cancer admitted to [facility] with diabetic foot infection to his right 3rd toe. He was referred from his podiatrist, Dr. Melara, due to a right 3rd toe ulcer with infection that will likely require amputation. Endorses that toe is malodorous and has some purulent drainage. Endorses associated peripheral neuropathy. Denies associated toe pain, ankle or leg pain. He has had two previous amputations (left great toe and right 5th toe) due to similar conditions. Per his podiatrist, he was taking Doxy and Cipro since 11/15/2022 for the current right 3rd toe infection and per patient he has been started on IV abx upon arrival to hospital. He reports that his toe infections began around 3 years ago after stepping on piece of glass barefoot. Denies associated headache, fever, chills, CP, SOB, N/V/D, abdominal pain, urinary symptoms, numbness or tingling. Endocrine consulted " "to manage type 2 diabetes and hyperglycemia.     Lab Results   Component Value Date    HGBA1C 7.1 (H) 11/10/2022              Interval HPI:   Overnight events: No acute events overnight. Patient on the POSS Unit in room 603/603 A. Blood glucose stable. BG at and above goal on current insulin regimen (SSI, prandial, and basal insulin ). Steroid use- None. 2 Days Post-Op  Renal function- Normal   Vasopressors-  None       Endocrine will continue to follow and manage insulin orders inpatient.         Diet diabetic [facility];  Calorie     Eatin%  Nausea: No  Hypoglycemia and intervention: No  Fever: No  TPN and/or TF: No      /79 (BP Location: Right arm, Patient Position: Lying)   Pulse (!) 55   Temp 98.1 °F (36.7 °C) (Oral)   Resp 18   Ht 6' 2" (1.88 m)   Wt 127 kg (280 lb)   SpO2 96%   BMI 35.95 kg/m²     Labs Reviewed and Include    No results for input(s): GLU, CALCIUM, ALBUMIN, PROT, NA, K, CO2, CL, BUN, CREATININE, ALKPHOS, ALT, AST, BILITOT in the last 24 hours.  Lab Results   Component Value Date    WBC 10.13 2022    HGB 13.6 (L) 2022    HCT 40.3 2022    MCV 89 2022     2022     No results for input(s): TSH, FREET4 in the last 168 hours.  Lab Results   Component Value Date    HGBA1C 7.1 (H) 11/10/2022       Nutritional status:   Body mass index is 35.95 kg/m².  Lab Results   Component Value Date    ALBUMIN 3.2 (L) 2022    ALBUMIN 3.0 (L) 2022    ALBUMIN 2.9 (L) 2022     Lab Results   Component Value Date    PREALBUMIN 22 2018    PREALBUMIN 21 2018    PREALBUMIN 20 2018       Estimated Creatinine Clearance: 142.5 mL/min (based on SCr of 0.8 mg/dL).    Accu-Checks  Recent Labs     22  0733 22  1116 22  1148 22  1535 22  2043 22  0735 22  1127 22  1609 22  0747   POCTGLUCOSE 270* 379* 340* 164* 152* 197* 233* 319* 243* 200*       Current " Medications and/or Treatments Impacting Glycemic Control  Immunotherapy:    Immunosuppressants       None          Steroids:   Hormones (From admission, onward)      Start     Stop Route Frequency Ordered    11/22/22 1511  melatonin tablet 6 mg         -- Oral Nightly PRN 11/22/22 1422          Pressors:    Autonomic Drugs (From admission, onward)      None          Hyperglycemia/Diabetes Medications:   Antihyperglycemics (From admission, onward)      Start     Stop Route Frequency Ordered    11/27/22 1130  insulin aspart U-100 pen 12 Units         -- SubQ 3 times daily with meals 11/27/22 0807    11/27/22 0907  insulin aspart U-100 pen 1-10 Units         -- SubQ Before meals & nightly PRN 11/27/22 0807    11/27/22 0900  insulin detemir U-100 pen 17 Units         -- SubQ 2 times daily 11/27/22 0807            ASSESSMENT and PLAN    * Diabetic foot infection  Optimize BG control to improve wound healing  Managed per primary team  Avoid hypoglycemia        Type 2 diabetes mellitus with diabetic peripheral angiopathy without gangrene, with long-term current use of insulin  Endocrinology consulted for BG management.   BG goal 140-180    - Patient does not have pump supplies and is not a good candidate for pump therapy at this time.     Re-weight-based at 0.5 units/kg/day (Basal/Prandial Matching) due to FBG and prandial BG above goal  - Levemir (Insulin Detemir) 17 units BID (20% increase due to FBG above goal)   - Novolog (Insulin Aspart) 12 units TIDWM and prn for BG excursions MDC SSI (150/25) (20% increase due to prandial BG above goal)   - BG checks AC/HS  - Hypoglycemia protocol in place      ** Please notify Endocrine for any change and/or advance in diet**  ** Please call Endocrine for any BG related issues **    Discharge Planning:   TBD. Please notify endocrinology prior to discharge.  - If discharged today would recommend resuming home insulin pump given BG improvement.     Hypertension associated with  diabetes    Uncontrolled HTN can result in worsening insulin resistance. On an ACE-I per ADA guidelines.          Kenneth Hughes DNP, FNP  Endocrinology  [facility] - [facility]

## 2022-11-27 NOTE — PROGRESS NOTES
[facility]ul[facility] - [facility] S[facility]  Podiatry  Progress Note    Patient Name: Billy Sheffield Miguel  MRN: [id number]  Admission Date: 11/22/2022  Hospital Length of Stay: 5 days  Attending Physician: Gisella Cheng MD  Primary Care Provider: BRAD Baker MD     Subjective:     Interval History: Patient was seen laying in bed. Patient says he feels good today and denies any pain fever chills nausea or vomiting. Patient says his wife broke her leg and he needs to leave soon to help her. Patient is stable at time of this visit. Patient has no other pedal complaints at this time.     Follow-up For: Procedure(s) (LRB):  AMPUTATION, TOE (Right)    Post-Operative Day: 2 Days Post-Op    Scheduled Meds:   ascorbic acid (vitamin C)  500 mg Oral Daily    aspirin  81 mg Oral Daily    atorvastatin  80 mg Oral QHS    carvediloL  6.25 mg Oral BID    cefTRIAXone (ROCEPHIN) IVPB  2 g Intravenous Q24H    heparin (porcine)  5,000 Units Subcutaneous Q8H    insulin aspart U-100  12 Units Subcutaneous TIDWM    insulin detemir U-100  17 Units Subcutaneous BID    lisinopriL  40 mg Oral Daily     Continuous Infusions:  PRN Meds:acetaminophen, albuterol-ipratropium, bisacodyL, dextrose 10%, dextrose 10%, glucagon (human recombinant), glucose, glucose, haloperidol lactate, HYDROmorphone, insulin aspart U-100, melatonin, naloxone, ondansetron, oxyCODONE, oxyCODONE, polyethylene glycol, promethazine, sodium chloride 0.9%, sodium chloride 0.9%    Review of Systems   Constitutional:  Negative for activity change, chills, diaphoresis and fever.   HENT:  Negative for trouble swallowing.    Eyes:  Negative for photophobia and visual disturbance.   Respiratory:  Negative for cough, chest tightness, shortness of breath and wheezing.    Cardiovascular:  Negative for chest pain, palpitations and leg swelling.   Gastrointestinal:  Negative for abdominal pain, constipation, diarrhea, nausea and vomiting.   Genitourinary:  Negative for dysuria, frequency,  hematuria and urgency.   Musculoskeletal:  Negative for arthralgias, back pain and gait problem.   Skin:  Positive for wound (s/p amputation R 3rd toe). Negative for rash.   Neurological:  Negative for dizziness, syncope, weakness, light-headedness, numbness and headaches.   Psychiatric/Behavioral:  Negative for agitation and confusion. The patient is not nervous/anxious.    Objective:     Vital Signs (Most Recent):  Temp: 98.2 °F (36.8 °C) (11/27/22 1600)  Pulse: (!) 59 (11/27/22 1600)  Resp: 18 (11/27/22 1600)  BP: 134/72 (11/27/22 1600)  SpO2: 96 % (11/27/22 1600)   Vital Signs (24h Range):  Temp:  [97.5 °F (36.4 °C)-98.2 °F (36.8 °C)] 98.2 °F (36.8 °C)  Pulse:  [53-59] 59  Resp:  [17-18] 18  SpO2:  [94 %-97 %] 96 %  BP: (127-145)/(67-88) 134/72     Weight: 127 kg (280 lb)  Body mass index is 35.95 kg/m².    Foot Exam    General  Orientation: alert and oriented to person, place, and time       Right Foot/Ankle     Inspection and Palpation  Tenderness: none   Swelling: (3rd toe)  Skin Exam: callus, drainage, dry skin, cellulitis, skin changes, abnormal color, ulcer and erythema; skin not intact     Neurovascular  Dorsalis pedis: 1+  Posterior tibial: doppler  Saphenous nerve sensation: absent  Tibial nerve sensation: absent  Superficial peroneal nerve sensation: absent  Deep peroneal nerve sensation: absent  Sural nerve sensation: absent    Comments  -Partial 5th ray amputated  -3rd toe s/p amputation. Dressings has some bleeding noticed. Surgical site was partially left open. Retention sutures are intact. Wound  measures 4cm x 2.5 cm x 3cm. Wound bed has some bleeding noticed. No purulent drainage noticed.   -2nd metatarsal head plantar wound down to bone, fibrotic wound bed, serosanguinous drainage. Not tender.       Left Foot/Ankle      Inspection and Palpation  Tenderness: none   Swelling: none   Skin Exam: callus and dry skin; no drainage, no cellulitis, no ulcer and no erythema     Neurovascular  Dorsalis  pedis: 1+  Posterior tibial: doppler  Saphenous nerve sensation: absent  Tibial nerve sensation: absent  Superficial peroneal nerve sensation: absent  Deep peroneal nerve sensation: absent  Sural nerve sensation: absent    Comments  -Partial 1st ray amputated                Laboratory:  A1C:   Recent Labs   Lab 06/08/22  0036 08/09/22  1634 11/10/22  0835   HGBA1C 10.0* 8.0* 7.1*     Blood Cultures: No results for input(s): LABBLOO in the last 48 hours.  CBC:   Recent Labs   Lab 11/26/22  0734   WBC 10.13   RBC 4.55*   HGB 13.6*   HCT 40.3      MCV 89   MCH 29.9   MCHC 33.7     CMP:   Recent Labs   Lab 11/26/22  0734   *   CALCIUM 9.0   ALBUMIN 3.2*   PROT 7.0   *   K 4.5   CO2 22*      BUN 17   CREATININE 0.8   ALKPHOS 94   ALT 71*   AST 54*   BILITOT 0.5     CRP:   Recent Labs   Lab 11/22/22  1154   CRP 15.6*     ESR:   Recent Labs   Lab 11/22/22  1154   SEDRATE 56*     Prealbumin: No results for input(s): PREALBUMIN in the last 48 hours.  Wound Cultures:   Recent Labs   Lab 06/08/22  0206 06/08/22  1231 06/09/22  1514 11/15/22  1654 11/25/22 2024   LABAERO KLEBSIELLA PNEUMONIAE ESBL  Many  *  STREPTOCOCCUS AGALACTIAE (GROUP B)  Many  Beta-hemolytic streptococci are routinely susceptible to   penicillins,cephalosporins and carbapenems.  Susceptibility testing not routinely performed  Skin josselin also present  * STREPTOCOCCUS AGALACTIAE (GROUP B)  Many  Beta-hemolytic streptococci are routinely susceptible to   penicillins,cephalosporins and carbapenems.  Susceptibility testing not routinely performed  *  KLEBSIELLA PNEUMONIAE ESBL  Few  * STREPTOCOCCUS AGALACTIAE (GROUP B)  Rare  Beta-hemolytic streptococci are routinely susceptible to   penicillins,cephalosporins and carbapenems.  Susceptibility testing not routinely performed  * STAPHYLOCOCCUS AUREUS  Many  *  PASTEURELLA MULTOCIDA   Many  Susceptibility testing not routinely performed  * No significant isolate to date      Microbiology Results (last 7 days)       Procedure Component Value Units Date/Time    Blood culture #1 **CANNOT BE ORDERED STAT** [[id number]] Collected: 11/22/22 1153    Order Status: Completed Specimen: Blood from Peripheral, Forearm, Right Updated: 11/27/22 1412     Blood Culture, Routine No growth after 5 days.    Blood culture #2 **CANNOT BE ORDERED STAT** [[id number]] Collected: 11/22/22 1153    Order Status: Completed Specimen: Blood from Peripheral, Forearm, Right Updated: 11/27/22 1412     Blood Culture, Routine No growth after 5 days.    Culture, Anaerobe [[id number]] Collected: 11/25/22 2024    Order Status: Completed Specimen: Wound from Foot, Right Updated: 11/27/22 1307     Anaerobic Culture Culture in progress    Narrative:      Bone, Right 3rd Toe    AFB Culture & Smear [[id number]] Collected: 11/25/22 2024    Order Status: Completed Specimen: Wound from Foot, Right Updated: 11/27/22 0927     AFB Culture & Smear Culture in progress    Narrative:      Bone, Right 3rd Toe    Aerobic culture [[id number]] Collected: 11/25/22 2024    Order Status: Completed Specimen: Wound from Foot, Right Updated: 11/27/22 0801     Aerobic Bacterial Culture No significant isolate to date    Narrative:      Bone, Right 3rd Toe    Gram stain [[id number]] Collected: 11/25/22 2024    Order Status: Completed Specimen: Wound from Foot, Right Updated: 11/25/22 2355     Gram Stain Result Rare WBC's      Rare Gram positive cocci    Narrative:      Bone, Right 3rd Toe    Fungus culture [[id number]] Collected: 11/25/22 2024    Order Status: Sent Specimen: Wound from Foot, Right Updated: 11/25/22 2055          Specimen (24h ago, onward)      None                Assessment/Plan:     * Diabetic foot infection  Assessment: IDSA moderate diabetic foot infection with right 3rd toe osteomyelitis and possible 2nd MTPJ osteomyelitis. X ray unremarkable. MRI + for right 3rd middle and proximal phalanx OM. Bone culture + for Staph aureus and  Pasteurella. Pedal pulses diminished with R CODY 1.1 and L VASILIY 1.2. patient now s/p right 3rd toe amputation.     Plan:  - s/p right 3rd toe amputation POD 2  - Intraop cultures pending  -Antibiotic plan per primary team.   -RLE WB to heel in surgical shoe for transfers or short distances. LLE WBAT.  -Dressing changes by nursing ordered.   -Podiatry will follow.     Discharge instructions: Patient to follow up with Dr. Melara in podiatry clinic within 1 week of discharge. Patient to RLE WB to heel in surgical shoe for transfers or short distances. LLE WBAT. Patient to keep dressing clean dry and intact. Home health or SNF for dressing changes to right foot as follows: Pack 3rd right toe amputation site with aqucel ag and plantar 2nd met head wound with aquacel ag. Dress right foot with kerlix, and ace wraps then loosely. Patient to continue ABX per ID plan.         Misael Parsons DPM PGY-1  Podiatric Medicine & Surgery  [facility]  Secure Chat Preferred  Mobile: [phone]  Pager: [phone]

## 2022-11-27 NOTE — ASSESSMENT & PLAN NOTE
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 X ray unremarkable. MRI + for right 3rd middle and proximal phalanx OM. Bone culture + for Staph aureus and Pasteurella. Pedal pulses diminished with R CODY 1.1 and L VASILIY 1.2. patient now s/p right 3rd toe amputation.     Plan:  - s/p right 3rd toe amputation POD 2  - Intraop cultures pending  -Antibiotic plan per primary team.   -RLE WB to heel in surgical shoe for transfers or short distances. LLE WBAT.  -Dressing changes by nursing ordered.   -Podiatry will follow.     Discharge instructions: Patient to follow up with Dr. Melara in podiatry clinic within 1 week of discharge. Patient to RLE WB to heel in surgical shoe for transfers or short distances. LLE WBAT. Patient to keep dressing clean dry and intact. Home health or SNF for dressing changes to right foot as follows: Pack 3rd right toe amputation site with aqucel ag and plantar 2nd met head wound with aquacel ag. Dress right foot with kerlix, and ace wraps then loosely. Patient to continue ABX per ID plan.

## 2022-11-27 NOTE — SUBJECTIVE & OBJECTIVE
Subjective:     Interval History: Patient was seen laying in bed. Patient says he feels good today and denies any pain fever chills nausea or vomiting. Patient says his wife broke her leg and he needs to leave soon to help her. Patient is stable at time of this visit. Patient has no other pedal complaints at this time.     Follow-up For: Procedure(s) (LRB):  AMPUTATION, TOE (Right)    Post-Operative Day: 2 Days Post-Op    Scheduled Meds:   ascorbic acid (vitamin C)  500 mg Oral Daily    aspirin  81 mg Oral Daily    atorvastatin  80 mg Oral QHS    carvediloL  6.25 mg Oral BID    cefTRIAXone (ROCEPHIN) IVPB  2 g Intravenous Q24H    heparin (porcine)  5,000 Units Subcutaneous Q8H    insulin aspart U-100  12 Units Subcutaneous TIDWM    insulin detemir U-100  17 Units Subcutaneous BID    lisinopriL  40 mg Oral Daily     Continuous Infusions:  PRN Meds:acetaminophen, albuterol-ipratropium, bisacodyL, dextrose 10%, dextrose 10%, glucagon (human recombinant), glucose, glucose, haloperidol lactate, HYDROmorphone, insulin aspart U-100, melatonin, naloxone, ondansetron, oxyCODONE, oxyCODONE, polyethylene glycol, promethazine, sodium chloride 0.9%, sodium chloride 0.9%    Review of Systems   Constitutional:  Negative for activity change, chills, diaphoresis and fever.   HENT:  Negative for trouble swallowing.    Eyes:  Negative for photophobia and visual disturbance.   Respiratory:  Negative for cough, chest tightness, shortness of breath and wheezing.    Cardiovascular:  Negative for chest pain, palpitations and leg swelling.   Gastrointestinal:  Negative for abdominal pain, constipation, diarrhea, nausea and vomiting.   Genitourinary:  Negative for dysuria, frequency, hematuria and urgency.   Musculoskeletal:  Negative for arthralgias, back pain and gait problem.   Skin:  Positive for wound (s/p amputation R 3rd toe). Negative for rash.   Neurological:  Negative for dizziness, syncope, weakness, light-headedness, numbness  and headaches.   Psychiatric/Behavioral:  Negative for agitation and confusion. The patient is not nervous/anxious.    Objective:     Vital Signs (Most Recent):  Temp: 98.2 °F (36.8 °C) (11/27/22 1600)  Pulse: (!) 59 (11/27/22 1600)  Resp: 18 (11/27/22 1600)  BP: 134/72 (11/27/22 1600)  SpO2: 96 % (11/27/22 1600)   Vital Signs (24h Range):  Temp:  [97.5 °F (36.4 °C)-98.2 °F (36.8 °C)] 98.2 °F (36.8 °C)  Pulse:  [53-59] 59  Resp:  [17-18] 18  SpO2:  [94 %-97 %] 96 %  BP: (127-145)/(67-88) 134/72     Weight: 127 kg (280 lb)  Body mass index is 35.95 kg/m².    Foot Exam    General  Orientation: alert and oriented to person, place, and time       Right Foot/Ankle     Inspection and Palpation  Tenderness: none   Swelling: (3rd toe)  Skin Exam: callus, drainage, dry skin, cellulitis, skin changes, abnormal color, ulcer and erythema; skin not intact     Neurovascular  Dorsalis pedis: 1+  Posterior tibial: doppler  Saphenous nerve sensation: absent  Tibial nerve sensation: absent  Superficial peroneal nerve sensation: absent  Deep peroneal nerve sensation: absent  Sural nerve sensation: absent    Comments  -Partial 5th ray amputated  -3rd toe s/p amputation. Dressings has some bleeding noticed. Surgical site was partially left open. Retention sutures are intact. Wound  measures 4cm x 2.5 cm x 3cm. Wound bed has some bleeding noticed. No purulent drainage noticed.   -2nd metatarsal head plantar wound down to bone, fibrotic wound bed, serosanguinous drainage. Not tender.       Left Foot/Ankle      Inspection and Palpation  Tenderness: none   Swelling: none   Skin Exam: callus and dry skin; no drainage, no cellulitis, no ulcer and no erythema     Neurovascular  Dorsalis pedis: 1+  Posterior tibial: doppler  Saphenous nerve sensation: absent  Tibial nerve sensation: absent  Superficial peroneal nerve sensation: absent  Deep peroneal nerve sensation: absent  Sural nerve sensation: absent    Comments  -Partial 1st ray  amputated                Laboratory:  A1C:   Recent Labs   Lab 06/08/22  0036 08/09/22  1634 11/10/22  0835   HGBA1C 10.0* 8.0* 7.1*     Blood Cultures: No results for input(s): LABBLOO in the last 48 hours.  CBC:   Recent Labs   Lab 11/26/22  0734   WBC 10.13   RBC 4.55*   HGB 13.6*   HCT 40.3      MCV 89   MCH 29.9   MCHC 33.7     CMP:   Recent Labs   Lab 11/26/22  0734   *   CALCIUM 9.0   ALBUMIN 3.2*   PROT 7.0   *   K 4.5   CO2 22*      BUN 17   CREATININE 0.8   ALKPHOS 94   ALT 71*   AST 54*   BILITOT 0.5     CRP:   Recent Labs   Lab 11/22/22  1154   CRP 15.6*     ESR:   Recent Labs   Lab 11/22/22  1154   SEDRATE 56*     Prealbumin: No results for input(s): PREALBUMIN in the last 48 hours.  Wound Cultures:   Recent Labs   Lab 06/08/22  0206 06/08/22  1231 06/09/22  1514 11/15/22  1654 11/25/22  2024   LABAERO KLEBSIELLA PNEUMONIAE ESBL  Many  *  STREPTOCOCCUS AGALACTIAE (GROUP B)  Many  Beta-hemolytic streptococci are routinely susceptible to   penicillins,cephalosporins and carbapenems.  Susceptibility testing not routinely performed  Skin josselin also present  * STREPTOCOCCUS AGALACTIAE (GROUP B)  Many  Beta-hemolytic streptococci are routinely susceptible to   penicillins,cephalosporins and carbapenems.  Susceptibility testing not routinely performed  *  KLEBSIELLA PNEUMONIAE ESBL  Few  * STREPTOCOCCUS AGALACTIAE (GROUP B)  Rare  Beta-hemolytic streptococci are routinely susceptible to   penicillins,cephalosporins and carbapenems.  Susceptibility testing not routinely performed  * STAPHYLOCOCCUS AUREUS  Many  *  PASTEURELLA MULTOCIDA   Many  Susceptibility testing not routinely performed  * No significant isolate to date     Microbiology Results (last 7 days)       Procedure Component Value Units Date/Time    Blood culture #1 **CANNOT BE ORDERED STAT** [[id number]] Collected: 11/22/22 1153    Order Status: Completed Specimen: Blood from Peripheral, Forearm, Right Updated: 11/27/22  1412     Blood Culture, Routine No growth after 5 days.    Blood culture #2 **CANNOT BE ORDERED STAT** [[id number]] Collected: 11/22/22 1153    Order Status: Completed Specimen: Blood from Peripheral, Forearm, Right Updated: 11/27/22 1412     Blood Culture, Routine No growth after 5 days.    Culture, Anaerobe [[id number]] Collected: 11/25/22 2024    Order Status: Completed Specimen: Wound from Foot, Right Updated: 11/27/22 1307     Anaerobic Culture Culture in progress    Narrative:      Bone, Right 3rd Toe    AFB Culture & Smear [[id number]] Collected: 11/25/22 2024    Order Status: Completed Specimen: Wound from Foot, Right Updated: 11/27/22 0927     AFB Culture & Smear Culture in progress    Narrative:      Bone, Right 3rd Toe    Aerobic culture [[id number]] Collected: 11/25/22 2024    Order Status: Completed Specimen: Wound from Foot, Right Updated: 11/27/22 0801     Aerobic Bacterial Culture No significant isolate to date    Narrative:      Bone, Right 3rd Toe    Gram stain [[id number]] Collected: 11/25/22 2024    Order Status: Completed Specimen: Wound from Foot, Right Updated: 11/25/22 2355     Gram Stain Result Rare WBC's      Rare Gram positive cocci    Narrative:      Bone, Right 3rd Toe    Fungus culture [[id number]] Collected: 11/25/22 2024    Order Status: Sent Specimen: Wound from Foot, Right Updated: 11/25/22 2055          Specimen (24h ago, onward)      None

## 2022-11-27 NOTE — CONSULTS
[facility] - Med Surg  Infectious Disease  Consult Note    Patient Name: Billy Yatesn  MRN: [id number]  Admission Date: 11/22/2022  Hospital Length of Stay: 5 days  Attending Physician: Gisella Cheng MD  Primary Care Provider: BRAD Baker MD         Inpatient consult to Infectious Diseases  Consult performed by: Cielo Almazan PA-C  Consult ordered by: Gisella Cheng MD          * Diabetic foot infection  ID consult received. Chart being reviewed. Full consult note with recommendations to follow.      In the interim, please call or secure chat with any questions.    Thank you,  Cielo Almazan PA-C  ID [id number] Spectra: [id number]

## 2022-11-27 NOTE — ASSESSMENT & PLAN NOTE
57-year-old man with CAD, HTN, DM2, diabetic foot infections, osteomyelitis of the left foot s/p left hallux amputation and partial left 2nd toe amputation (2020) and right 5th ray amputation admitted for right third toe infection/osteomyelitis.     Outpatient bone culture of right third toe 11/15 grew MSSA and Pasturella. He also has a plantar foot wound at level of second met head that probes to bone. Podiatry took patient to the OR on 11/25 for right third toe amputation. Surgical cx are in process (It does not appear clean margin bone cultures were sent). Gram stain with GPCs. ID consulted for antibiotic recommendations. Patient is overall doing well. Afebrile. Reports needing to be discharged soon to take care of his wife.    Recommendations  · Continue Ceftriaxone 2 g IV q 24 hours for pasturella and MSSA  · Follow surgical cultures to guide antibiotics  · Anticipate 6 weeks of antibiotic therapy for osteomyelitis   · If patient leaves AMA, can discharge on PO doxycycline and will need to arrange outpatient ID follow up  · Discussed plan with Dr. Cheng. ID will follow.

## 2022-11-27 NOTE — SUBJECTIVE & OBJECTIVE
"Interval HPI:   Overnight events: No acute events overnight. Patient on the [facility] Unit in room 603/603 A. Blood glucose stable. BG at and above goal on current insulin regimen (SSI, prandial, and basal insulin ). Steroid use- None. 2 Days Post-Op  Renal function- Normal   Vasopressors-  None       Endocrine will continue to follow and manage insulin orders inpatient.         Diet diabetic [facility];  Calorie     Eatin%  Nausea: No  Hypoglycemia and intervention: No  Fever: No  TPN and/or TF: No      /79 (BP Location: Right arm, Patient Position: Lying)   Pulse (!) 55   Temp 98.1 °F (36.7 °C) (Oral)   Resp 18   Ht 6' 2" (1.88 m)   Wt 127 kg (280 lb)   SpO2 96%   BMI 35.95 kg/m²     Labs Reviewed and Include    No results for input(s): GLU, CALCIUM, ALBUMIN, PROT, NA, K, CO2, CL, BUN, CREATININE, ALKPHOS, ALT, AST, BILITOT in the last 24 hours.  Lab Results   Component Value Date    WBC 10.13 2022    HGB 13.6 (L) 2022    HCT 40.3 2022    MCV 89 2022     2022     No results for input(s): TSH, FREET4 in the last 168 hours.  Lab Results   Component Value Date    HGBA1C 7.1 (H) 11/10/2022       Nutritional status:   Body mass index is 35.95 kg/m².  Lab Results   Component Value Date    ALBUMIN 3.2 (L) 2022    ALBUMIN 3.0 (L) 2022    ALBUMIN 2.9 (L) 2022     Lab Results   Component Value Date    PREALBUMIN 22 2018    PREALBUMIN 21 2018    PREALBUMIN 20 2018       Estimated Creatinine Clearance: 142.5 mL/min (based on SCr of 0.8 mg/dL).    Accu-Checks  Recent Labs     22  0733 22  1116 22  1148 22  1535 22  2043 22  0735 22  1127 22  1609 22  22  0747   POCTGLUCOSE 270* 379* 340* 164* 152* 197* 233* 319* 243* 200*       Current Medications and/or Treatments Impacting Glycemic Control  Immunotherapy:    Immunosuppressants       None          Steroids: "   Hormones (From admission, onward)      Start     Stop Route Frequency Ordered    11/22/22 1511  melatonin tablet 6 mg         -- Oral Nightly PRN 11/22/22 1422          Pressors:    Autonomic Drugs (From admission, onward)      None          Hyperglycemia/Diabetes Medications:   Antihyperglycemics (From admission, onward)      Start     Stop Route Frequency Ordered    11/27/22 1130  insulin aspart U-100 pen 12 Units         -- SubQ 3 times daily with meals 11/27/22 0807    11/27/22 0907  insulin aspart U-100 pen 1-10 Units         -- SubQ Before meals & nightly PRN 11/27/22 0807    11/27/22 0900  insulin detemir U-100 pen 17 Units         -- SubQ 2 times daily 11/27/22 0807

## 2022-11-27 NOTE — SUBJECTIVE & OBJECTIVE
Subjective:     Interval History: Patient is seen laying in bed. Patient says he feels good today. Patient does admit to mild pain on right foot. Patient denies fever chills nausea vomiting.  Patient admits he feels he has been walking and putting pressure on his right foot. Patient is stable at time of visit. Patient has no other pedal complaints at this time.     Follow-up For: Procedure(s) (LRB):  AMPUTATION, TOE (Right)    Post-Operative Day: 1 Days Post-Op    Scheduled Meds:   ascorbic acid (vitamin C)  500 mg Oral Daily    aspirin  81 mg Oral Daily    atorvastatin  80 mg Oral QHS    carvediloL  6.25 mg Oral BID    heparin (porcine)  5,000 Units Subcutaneous Q8H    insulin aspart U-100  10 Units Subcutaneous TIDWM    insulin detemir U-100  15 Units Subcutaneous BID    lisinopriL  40 mg Oral Daily     Continuous Infusions:  PRN Meds:acetaminophen, albuterol-ipratropium, bisacodyL, dextrose 10%, dextrose 10%, glucagon (human recombinant), glucose, glucose, haloperidol lactate, HYDROmorphone, insulin aspart U-100, melatonin, naloxone, ondansetron, oxyCODONE, oxyCODONE, polyethylene glycol, promethazine, sodium chloride 0.9%, sodium chloride 0.9%    Review of Systems   Constitutional:  Negative for activity change, chills, diaphoresis and fever.   HENT:  Negative for trouble swallowing.    Eyes:  Negative for photophobia and visual disturbance.   Respiratory:  Negative for cough, chest tightness, shortness of breath and wheezing.    Cardiovascular:  Negative for chest pain, palpitations and leg swelling.   Gastrointestinal:  Negative for abdominal pain, constipation, diarrhea, nausea and vomiting.   Genitourinary:  Negative for dysuria, frequency, hematuria and urgency.   Musculoskeletal:  Negative for arthralgias, back pain and gait problem.   Skin:  Positive for wound (s/p amputation R 3rd toe). Negative for rash.   Neurological:  Negative for dizziness, syncope, weakness, light-headedness, numbness and  headaches.   Psychiatric/Behavioral:  Negative for agitation and confusion. The patient is not nervous/anxious.    Objective:     Vital Signs (Most Recent):  Temp: 97.7 °F (36.5 °C) (11/26/22 2357)  Pulse: (!) 58 (11/26/22 2357)  Resp: 17 (11/26/22 2357)  BP: 128/67 (11/26/22 2357)  SpO2: (!) 94 % (11/26/22 2357)   Vital Signs (24h Range):  Temp:  [97.4 °F (36.3 °C)-97.8 °F (36.6 °C)] 97.7 °F (36.5 °C)  Pulse:  [47-63] 58  Resp:  [17-18] 17  SpO2:  [94 %-99 %] 94 %  BP: (128-145)/(67-81) 128/67     Weight: 127 kg (280 lb)  Body mass index is 35.95 kg/m².    Foot Exam    General  Orientation: alert and oriented to person, place, and time       Right Foot/Ankle     Inspection and Palpation  Tenderness: none   Swelling: (3rd toe)  Skin Exam: callus, drainage, dry skin, cellulitis, skin changes, abnormal color, ulcer and erythema; skin not intact     Neurovascular  Dorsalis pedis: 1+  Posterior tibial: doppler  Saphenous nerve sensation: absent  Tibial nerve sensation: absent  Superficial peroneal nerve sensation: absent  Deep peroneal nerve sensation: absent  Sural nerve sensation: absent    Comments  -Partial 5th ray amputated  -3rd toe s/p amputation. Dressings has some bleeding noticed. Surgical site was partially left open. Retention sutures are intact. Wound  measures 4cm x 2.5 cm x 3cm. Wound bed has some bleeding noticed. No purulent drainage noticed.   -2nd metatarsal head plantar wound down to bone, fibrotic wound bed, serosanguinous drainage. Not tender.     Left Foot/Ankle      Inspection and Palpation  Tenderness: none   Swelling: none   Skin Exam: callus and dry skin; no drainage, no cellulitis, no ulcer and no erythema     Neurovascular  Dorsalis pedis: 1+  Posterior tibial: doppler  Saphenous nerve sensation: absent  Tibial nerve sensation: absent  Superficial peroneal nerve sensation: absent  Deep peroneal nerve sensation: absent  Sural nerve sensation: absent    Comments  -Partial 1st ray  amputated              Laboratory:  A1C:   Recent Labs   Lab 06/08/22  0036 08/09/22  1634 11/10/22  0835   HGBA1C 10.0* 8.0* 7.1*     Blood Cultures: No results for input(s): LABBLOO in the last 48 hours.  CBC:   Recent Labs   Lab 11/26/22  0734   WBC 10.13   RBC 4.55*   HGB 13.6*   HCT 40.3      MCV 89   MCH 29.9   MCHC 33.7     CMP:   Recent Labs   Lab 11/26/22  0734   *   CALCIUM 9.0   ALBUMIN 3.2*   PROT 7.0   *   K 4.5   CO2 22*      BUN 17   CREATININE 0.8   ALKPHOS 94   ALT 71*   AST 54*   BILITOT 0.5     Coagulation: No results for input(s): PT, INR, APTT in the last 168 hours.  CRP:   Recent Labs   Lab 11/22/22  1154   CRP 15.6*     ESR:   Recent Labs   Lab 11/22/22  1154   SEDRATE 56*     Prealbumin: No results for input(s): PREALBUMIN in the last 48 hours.  Wound Cultures:   Recent Labs   Lab 06/08/22  0206 06/08/22  1231 06/09/22  1514 11/15/22  1654   LABAERO KLEBSIELLA PNEUMONIAE ESBL  Many  *  STREPTOCOCCUS AGALACTIAE (GROUP B)  Many  Beta-hemolytic streptococci are routinely susceptible to   penicillins,cephalosporins and carbapenems.  Susceptibility testing not routinely performed  Skin josselin also present  * STREPTOCOCCUS AGALACTIAE (GROUP B)  Many  Beta-hemolytic streptococci are routinely susceptible to   penicillins,cephalosporins and carbapenems.  Susceptibility testing not routinely performed  *  KLEBSIELLA PNEUMONIAE ESBL  Few  * STREPTOCOCCUS AGALACTIAE (GROUP B)  Rare  Beta-hemolytic streptococci are routinely susceptible to   penicillins,cephalosporins and carbapenems.  Susceptibility testing not routinely performed  * STAPHYLOCOCCUS AUREUS  Many  *  PASTEURELLA MULTOCIDA   Many  Susceptibility testing not routinely performed  *     Microbiology Results (last 7 days)       Procedure Component Value Units Date/Time    Blood culture #1 **CANNOT BE ORDERED STAT** [[id number]] Collected: 11/22/22 1153    Order Status: Completed Specimen: Blood from Peripheral,  Forearm, Right Updated: 11/26/22 1412     Blood Culture, Routine No Growth to date      No Growth to date      No Growth to date      No Growth to date      No Growth to date    Blood culture #2 **CANNOT BE ORDERED STAT** [[id number]] Collected: 11/22/22 1153    Order Status: Completed Specimen: Blood from Peripheral, Forearm, Right Updated: 11/26/22 1412     Blood Culture, Routine No Growth to date      No Growth to date      No Growth to date      No Growth to date      No Growth to date    Gram stain [[id number]] Collected: 11/25/22 2024    Order Status: Completed Specimen: Wound from Foot, Right Updated: 11/25/22 2355     Gram Stain Result Rare WBC's      Rare Gram positive cocci    Narrative:      Bone, Right 3rd Toe    Culture, Anaerobe [[id number]] Collected: 11/25/22 2024    Order Status: Sent Specimen: Wound from Foot, Right Updated: 11/25/22 2056    AFB Culture & Smear [[id number]] Collected: 11/25/22 2024    Order Status: Sent Specimen: Wound from Foot, Right Updated: 11/25/22 2056    Aerobic culture [[id number]] Collected: 11/25/22 2024    Order Status: Sent Specimen: Wound from Foot, Right Updated: 11/25/22 2055    Fungus culture [[id number]] Collected: 11/25/22 2024    Order Status: Sent Specimen: Wound from Foot, Right Updated: 11/25/22 2055          Specimen (24h ago, onward)      None

## 2022-11-27 NOTE — CONSULTS
[facility] Medicine  Telemedicine Consult Note    Patient Name: Billy Yatesn  MRN: [id number]  Admission Date: 11/22/2022  Hospital Length of Stay: 4 days  Attending Physician: Terrance Sierra MD   Primary Care Provider: BRAD Baker MD       Thank you for your consult to [facility]. We have reviewed the patient chart. This patient does meet criteria for [facility] service at this time. Will assume care on 11/27/22 at 7AM.        Gisella Cheng MD  Department of Hospital Medicine   [facility]

## 2022-11-27 NOTE — PROGRESS NOTES
[facility]  Telemedicine Progress Note    Patient Name: Billy Rivera  MRN: [id number]  Patient Class: IP- Inpatient   Admission Date: 11/22/2022  Length of Stay: 5 days  Attending Physician: Gisella Cheng MD  Primary Care Provider: BRAD Baker MD      Subjective:     Principal Problem:Diabetic foot infection    HPI:  Billy Rivera is a 57 y.o. male with a history of type 2 diabetes, CAD, HTN, and colon cancer admitted to [facility] IP with diabetic foot infection to his right 3rd toe. He was referred from his podiatrist, Dr. Melara, due to a right 3rd toe ulcer with infection that will likely require amputation. Endorses that toe is malodorous and has some purulent drainage. Endorses associated peripheral neuropathy. Denies associated toe pain, ankle or leg pain. He has had two previous amputations (left great toe and right 5th toe) due to similar conditions. Per his podiatrist, he was taking Doxy and Cipro since 11/15/2022 for the current right 3rd toe infection and per patient he has been started on IV abx upon arrival to hospital. He reports that his toe infections began around 3 years ago after stepping on piece of glass barefoot. Denies associated headache, fever, chills, CP, SOB, N/V/D, abdominal pain, urinary symptoms, numbness or tingling.       ED: VSS, AF. WBC 10, lactic acid elevated to 2.3. ESR & CRP mildly elevated, other labs unremarkable. Blood cultures pending. Right foot XR showing DJD without acute fracture or bone destruction. Given 1 L IVF and started on IV Vanc and Cefepime.      Overview/Hospital Course:  Status post R 3rd toe amputation on 11/25.      Telemedicine  This service was provided by Virtual Visit.    Patient was transferred to [facility] on:  11/27/2022    Chief Complaint   Patient presents with    Wound Check     States Dr. Ellis middle toe on R foot amputated. Told to come to ED. Denies pain.      The patient location  is: 603/603 A   Admitted 11/22/2022 11:08 AM    Interval History / Events Overnight:   The patient is able to provide adequate history. Additional history was obtained from past medical records. No significant events reported by Nursing. Patient non-adherent to minimal ambulation and NWB of forefoot.  Patient complains of nothing specific. Symptoms have improved since yesterday. Associated symptoms include: fatigue. Symptoms are stable.     Lab test(s) reviewed: hyperglycemia    Review of Systems   Constitutional:  Negative for fever.   Respiratory:  Negative for shortness of breath.      Objective:     Vital Signs (Most Recent):  Temp: 98.1 °F (36.7 °C) (11/27/22 0729)  Pulse: (!) 55 (11/27/22 0729)  Resp: 18 (11/27/22 0729)  BP: 134/79 (11/27/22 0729)  SpO2: 96 % (11/27/22 0729)   Vital Signs (24h Range):  Temp:  [97.4 °F (36.3 °C)-98.1 °F (36.7 °C)] 98.1 °F (36.7 °C)  Pulse:  [52-58] 55  Resp:  [17-18] 18  SpO2:  [94 %-99 %] 96 %  BP: (128-145)/(67-81) 134/79     Weight: 127 kg (280 lb)  Body mass index is 35.95 kg/m².  No intake or output data in the 24 hours ending 11/27/22 1015   Physical Exam  Constitutional:       General: He is not in acute distress.     Appearance: Normal appearance.   Eyes:      General: Lids are normal. No scleral icterus.        Right eye: No discharge.         Left eye: No discharge.      Conjunctiva/sclera: Conjunctivae normal.   Neck:      Trachea: Phonation normal.   Cardiovascular:      Rate and Rhythm: Bradycardia present.      Comments: Monitor / Vital signs reviewed at time of visit  Pulmonary:      Effort: Pulmonary effort is normal. No tachypnea, accessory muscle usage or respiratory distress.   Abdominal:      General: There is no distension.   Skin:     Coloration: Skin is not cyanotic.   Neurological:      Mental Status: He is alert. He is not disoriented.   Psychiatric:         Attention and Perception: Attention normal.         Mood and Affect: Affect normal.          Behavior: Behavior is cooperative.       Significant Labs:  Recent Labs   Lab 06/08/22  0036 08/09/22  1634 11/10/22  0835   HGBA1C 10.0* 8.0* 7.1*     Recent Labs   Lab 11/26/22  1609 11/26/22 2001 11/27/22  0747   POCTGLUCOSE 319* 243* 200*     Recent Labs   Lab 11/22/22  1154 11/26/22  0734   WBC 10.62 10.13   HGB 14.7 13.6*   HCT 43.2 40.3    247     Recent Labs   Lab 11/22/22  1154 11/26/22  0734   GRAN 67.8  7.2 71.3  7.2   LYMPH 17.9*  1.9 15.4*  1.6   MONO 10.5  1.1* 8.6  0.9   EOS 0.3 0.3     Recent Labs   Lab 11/23/22  0253 11/24/22  0320 11/25/22  0336 11/26/22  0734   * 131* 130* 134*   K 4.4 4.1 4.4 4.5    100 100 102   CO2 23 24 22* 22*   BUN 19 22* 21* 17   CREATININE 0.9 1.0 0.9 0.8   * 248* 331* 205*   CALCIUM 9.4 8.4* 8.6* 9.0   ALBUMIN 3.0* 2.9* 3.0* 3.2*   MG 1.7 1.9 1.8  --    PHOS 3.5 3.3 2.7  --      Recent Labs   Lab 11/22/22  1154 11/23/22 0253 11/24/22 0320 11/25/22 0336 11/26/22  0734   ALKPHOS 97   < > 80 86 94   ALT 26   < > 21 25 71*   AST 21   < > 15 16 54*   PROT 7.7   < > 6.3 6.5 7.0   BILITOT 0.6   < > 0.4 0.3 0.5   CRP 15.6*  --   --   --   --     < > = values in this interval not displayed.     Recent Labs   Lab 06/08/22  0036 11/22/22  1154 11/22/22 2001   LACTATE  --  2.3* 0.8   SEDRATE 78* 56*  --      SARS-CoV-2 RNA, Amplification, Qual (no units)   Date Value   11/22/2022 Negative   09/01/2020 Positive (A)   07/03/2020 Negative     POC Rapid COVID (no units)   Date Value   06/08/2022 Negative   12/21/2020 Negative   10/12/2020 Negative       X-Ray Foot Complete Right  Narrative: EXAMINATION:  XR FOOT COMPLETE 3 VIEW RIGHT    CLINICAL HISTORY:  post op;. Subacute osteomyelitis, right ankle and foot    TECHNIQUE:  AP, lateral, and oblique views of the right foot were performed.    COMPARISON:  Radiographic examination of the right foot November 22, 2022    FINDINGS:  As on the prior examination there is amputation of the mid to distal 5th  metatarsal, phalanges and toe.  In the interim since the prior examination there has been amputation of the phalanges and toe of the 3rd digit.  There are changes of the soft tissues that may relate to recent postoperative change including appearance of swelling and air density within the soft tissues.    The remainder of the visualized osseous structures demonstrate chronic appearing change and appears stable.  Impression: Postoperative changes are noted, as discussed above.    Electronically signed by: Mingo Adan  Date:    11/25/2022  Time:    23:03      Labs and Imaging within the last 24 hours listed above were reviewed.       Diet: Diet diabetic [facility]; 2000 Calorie  Significant LDAs:   IV Access Type: Peripheral  Urinary Catheter Indication if present: Patient Does Not Have Urinary Catheter  Other Lines/Tubes/Drains:      Goals of Care:    Previous admission:  6/7/22  Likely prognosis:  Fair  Code Status: Full Code  Comfort Only: No  Hospice: No  Goals at discharge: remain at home, with physician follow-up    Discharge Planning   DARVIN: 11/29/2022     Code Status: Full Code   Is the patient medically ready for discharge?: No    Reason for patient still in hospital (select all that apply): Patient trending condition and Pending disposition  Discharge Plan A: Home Health   Discharge Delays: None known at this time      Assessment/Plan:      * Diabetic foot infection  Right foot pain  Pt with 2 previous toe amputations for similar infections - left great toe and right 5th toe - most recent 06/2022. Previous cultures 06/2022 significant for Strept agalactiae (GBS) and Prevotella bivia. Cultures by Podiatry clinic 11/15/2022 significant for Staph aureus and Pasturella multocida. ESR & CRP elevated. WBC without leukocytosis  - current blood cultures NGTD  - Podiatry consulted, S/p amputation of 3rd R toe on 11/25.  - Was on ceftriaxone from pre-ampuation bone bx  - keep foot elevated and dry  - ID  consulted - plan for 6 weeks ceftriaxone.    Ulcer of toe, right, with necrosis of bone  -s/p right 3rd toe amputation with Podiatry  - Intraop cultures pending  -RLE WB to heel in surgical shoe for transfers or short distances only. LLE WBAT.  -Dressing changes by nursing ordered per Podiatry     Right foot pain  See diabetic foot infection    CAD (coronary artery disease)  - continue HTN management   - EKG prn for chest pain    Diabetic foot ulcer associated with type 2 diabetes mellitus  Patient's FSGs are uncontrolled.  Maintain anti-hyperglycemic dose as follows-   Endocrinology consulted and managing:  Antihyperglycemics (From admission, onward)    Start     Stop Route Frequency Ordered    11/27/22 1130  insulin aspart U-100 pen 12 Units         -- SubQ 3 times daily with meals 11/27/22 0807    11/27/22 0907  insulin aspart U-100 pen 1-10 Units         -- SubQ Before meals & nightly PRN 11/27/22 0807    11/27/22 0900  insulin detemir U-100 pen 17 Units         -- SubQ 2 times daily 11/27/22 0807          Type 2 diabetes mellitus with diabetic peripheral angiopathy without gangrene, with long-term current use of insulin  Endocrinology consulted and managing    Hypertension associated with diabetes  - Continue Coreg 6.25 mg BID  - continue lisinopril 40 mg daily        Active Hospital Problems    Diagnosis  POA    *Diabetic foot infection [E11.628, L08.9]  Yes    Ulcer of toe, right, with necrosis of bone [L97.514]  Yes    Right foot pain [M79.671]  Yes    CAD (coronary artery disease) [I25.10]  Yes     Chronic    Diabetic foot ulcer associated with type 2 diabetes mellitus [E11.621, L97.509]  Yes    Type 2 diabetes mellitus with diabetic peripheral angiopathy without gangrene, with long-term current use of insulin [E11.51, Z79.4]  Not Applicable     Chronic    Hypertension associated with diabetes [E11.59, I15.2]  Yes     Chronic      Resolved Hospital Problems    Diagnosis Date Resolved POA    Sleep  apnea [G47.30] 11/22/2022 Yes     Chronic       Inpatient Medications Prescribed for Management of Current Problems:     Scheduled Meds:    ascorbic acid (vitamin C)  500 mg Oral Daily    aspirin  81 mg Oral Daily    atorvastatin  80 mg Oral QHS    carvediloL  6.25 mg Oral BID    cefTRIAXone (ROCEPHIN) IVPB  2 g Intravenous Q24H    heparin (porcine)  5,000 Units Subcutaneous Q8H    insulin aspart U-100  12 Units Subcutaneous TIDWM    insulin detemir U-100  17 Units Subcutaneous BID    lisinopriL  40 mg Oral Daily     Continuous Infusions:   As Needed: acetaminophen, albuterol-ipratropium, bisacodyL, dextrose 10%, dextrose 10%, glucagon (human recombinant), glucose, glucose, haloperidol lactate, HYDROmorphone, insulin aspart U-100, melatonin, naloxone, ondansetron, oxyCODONE, oxyCODONE, polyethylene glycol, promethazine, sodium chloride 0.9%, sodium chloride 0.9%    VTE Risk Mitigation (From admission, onward)         Ordered     heparin (porcine) injection 5,000 Units  Every 8 hours         11/22/22 1422     IP VTE HIGH RISK PATIENT  Once         11/22/22 1422     Place sequential compression device  Until discontinued         11/22/22 1422              I have completed this tele-visit with the assistance of a telepresenter.    The attending portion of this evaluation, treatment, and documentation was performed per Gisella Cheng MD via Telemedicine AudioVisual using the secure [employer] software platform with 2 way audio/video. The provider was located off-site and the patient is located in the hospital. The aforementioned video software was utilized to document the relevant history and physical exam    Gisella Cheng MD  Department of Hospital Medicine   [facility]

## 2022-11-27 NOTE — ASSESSMENT & PLAN NOTE
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 X ray unremarkable. MRI + for right 3rd middle and proximal phalanx OM. Bone culture + for Staph aureus and Pasteurella. Pedal pulses diminished with R CODY 1.1 and L VASILIY 1.2. patient now s/p right 3rd toe amputation.     Plan:  - s/p right 3rd toe amputation POD 1  - Intraop cultures pending  -Antibiotic plan per primary team.   -RLE WB to heel in surgical shoe for transfers or short distances. LLE WBAT.  -Dressing changes by nursing ordered.   -Podiatry will follow.

## 2022-11-27 NOTE — CONSULTS
[facility] Surg  Infectious Disease  Consult Note    Patient Name: Billy Rivera  MRN: [id number]  Admission Date: 11/22/2022  Hospital Length of Stay: 5 days  Attending Physician: Gisella Cheng MD  Primary Care Provider: BRAD Baker MD     Isolation Status: No active isolations    Patient information was obtained from patient and past medical records.      Consults  Assessment/Plan:     * Diabetic foot infection     57-year-old man with CAD, HTN, DM2, diabetic foot infections, osteomyelitis of the left foot s/p left hallux amputation and partial left 2nd toe amputation (2020) and right 5th ray amputation admitted for right third toe infection/osteomyelitis.     Outpatient bone culture of right third toe 11/15 grew MSSA and Pasturella. He also has a plantar foot wound at level of second met head that probes to bone. Podiatry took patient to the OR on 11/25 for right third toe amputation. Surgical cx are in process (It does not appear clean margin bone cultures were sent). Gram stain with GPCs. ID consulted for antibiotic recommendations. Patient is overall doing well. Afebrile. Reports needing to be discharged soon to take care of his wife.    Recommendations  · Continue Ceftriaxone 2 g IV q 24 hours for pasturella and MSSA  · Follow surgical cultures to guide antibiotics  · Anticipate 6 weeks of antibiotic therapy for osteomyelitis   · If patient leaves AMA, can discharge on PO doxycycline and will need to arrange outpatient ID follow up  · Discussed plan with Dr. Cheng. ID will follow.            Thank you for the consult. Please call for any questions.  Cielo Almazan PA-C  ID [id number] Spectra: [id number]  Subjective:     Principal Problem: Diabetic foot infection    HPI: Mr. Rivera is a 57-year-old man with CAD, HTN, DM2, recurrent diabetic foot infection, osteomyelitis of the left foot s/p left hallux amputation and partial left 2nd toe amputation (2020) and right 5th ray amputation admitted for right  third toe infection.     Patient is followed closely in podiatry clinic. On 11/15 underwent right third toe bone biopsy and cultures grew MSSA and Pasturella. He was started on doxy/cipro. On 11/22 he followed up in clinic and there was increased redness, swelling and malodor to the right 3rd digit. He also has a second met head plantar foot  wound that probes to bone. He was admitted for further evaluation and management. Patient started on empiric Vancomycin and Ceftriaxone.  MRI showed osteomyelitis involving the proximal middle phalanges of the 3rd toe.  There is associated ulceration along the medial aspect of the toe near the PIP joint, with fluid tracking dorsally.  No discrete rim enhancing fluid collection. Podiatry took patient to the OR on 11/25 for right third toe amputation. Surgical cx are in process (It does not appear clean margin bone cultures were sent). Gram stain with GPCs. ID consulted for antibiotic recommendations. Patient is overall doing well. Afebrile. Reports needing to be discharged soon to take care of his wife who broke her foot.        Past Medical History:   Diagnosis Date    Allergy     CAD (coronary artery disease), native coronary artery 6/25/2013    Cancer     COVID-19 virus detected 9/1/2020    Diabetes mellitus     Diabetes mellitus, type 2     Disorder of kidney and ureter     Heart attack 04/2012    Hx of colon cancer, stage I     Hyperlipidemia     Hypertension     Muscular pain     post-op after colonoscopy    NSTEMI May 2013 - peak troponin 0.22 5/22/2013    MICHAELA (obstructive sleep apnea)     Retinopathy due to secondary diabetes        Past Surgical History:   Procedure Laterality Date    COLONOSCOPY N/A 2/17/2017    Procedure: COLONOSCOPY;  Surgeon: Simon Gomez MD;  Location: [facility] ([address] [place] [address]);  Service: Endoscopy;  Laterality: N/A;    CORONARY ANGIOPLASTY      INCISION AND DRAINAGE FOOT Right 6/5/2018    Procedure: INCISION AND DRAINAGE, FOOT;   Surgeon: Bridget Santana DPM;  Location: [facility] OR 1ST FLR;  Service: Podiatry;  Laterality: Right;  Request mini C arm in room. request wound vac with medium black sponge    INCISION AND DRAINAGE FOOT Right 6/7/2018    Procedure: INCISION AND DRAINAGE, FOOT;  Surgeon: Emelia Brock DPM;  Location: [facility] OR 2ND FLR;  Service: Podiatry;  Laterality: Right;    INCISION AND DRAINAGE FOOT Left 9/4/2020    Procedure: INCISION AND DRAINAGE, FOOT;  Surgeon: Ainsley Powell DPM;  Location: [facility] OR 2ND FLR;  Service: Podiatry;  Laterality: Left;    INCISION AND DRAINAGE FOOT Left 12/22/2020    Procedure: INCISION AND DRAINAGE, FOOT;  Surgeon: Ainsley Powell DPM;  Location: [facility] OR 2ND FLR;  Service: Podiatry;  Laterality: Left;  May need micro choice  Need Jam shidi needles  Stretcher OK      INCISION AND DRAINAGE OF ABSCESS Right 6/2/2018    Procedure: INCISION AND DRAINAGE, ABSCESS;  Surgeon: Bridget Santana DPM;  [facility];  Service: General;  Laterality: Right;    OSTEOTOMY OF METATARSAL BONE  9/4/2020    Procedure: OSTEOTOMY, METATARSAL BONE, 1st METATARSAL RESECTION;  Surgeon: Ainsley Powell DPM;  Location: [facility] OR 2ND FLR;  Service: Podiatry;;    REMOVAL OF FOREIGN BODY FROM FOOT Right 6/7/2018    Procedure: REMOVAL, FOREIGN BODY, FOOT;  Surgeon: Emelia Brock DPM;  Location: [facility] OR 2ND FLR;  Service: Podiatry;  Laterality: Right;    TOE AMPUTATION Left 7/4/2020    Procedure: AMPUTATION, TOE;  Surgeon: Bridget Santana DPM;  Location: [facility] OR 2ND FLR;  Service: Podiatry;  Laterality: Left;    TOE AMPUTATION Left 10/14/2020    Procedure: AMPUTATION, TOE;  Surgeon: Mingo Melara DPM;  Location: [facility] OR 2ND FLR;  Service: Podiatry;  Laterality: Left;  stretcher OK    TOE AMPUTATION Right 6/9/2022    Procedure: AMPUTATION, TOE - Dr. Melara @ [place] in am;  Surgeon: Mingo Melara DPM;  Location: [facility] OR 1ST FLR;  Service: Podiatry;  Laterality: Right;    TOE AMPUTATION Right  11/23/2022    Procedure: AMPUTATION, TOE;  Surgeon: Hansa Robertson DPM;  Location: [facility] OR [address] [place] [address];  Service: Podiatry;  Laterality: Right;    TOE AMPUTATION Right 11/25/2022    Procedure: AMPUTATION, TOE;  Surgeon: Chris Groves DPM;  [facility];  Service: Podiatry;  Laterality: Right;    TONSILLECTOMY         Review of patient's allergies indicates:   Allergen Reactions    Penicillins Other (See Comments)     PCN allergy as a child - was told he went into a coma. Tolerates Cefazolin without adverse reactions    Shellfish containing products      Other reaction(s): Unknown    Vancomycin Itching     Tolerated vancomycin 7/2020    Bactrim  [sulfamethoxazole-trimethoprim] Rash       Medications:  Medications Prior to Admission   Medication Sig    acetaminophen (TYLENOL) 500 MG tablet Take 1,000 mg by mouth daily as needed for Pain.    ascorbic acid, vitamin C, (VITAMIN C) 250 MG tablet Take 500 mg by mouth once daily.    aspirin (ECOTRIN) 81 MG EC tablet Take 1 tablet (81 mg total) by mouth once daily.    aspirin 325 MG tablet Take 325 mg by mouth daily as needed for Pain.    atorvastatin (LIPITOR) 80 MG tablet Take 1 tablet (80 mg total) by mouth once daily. Take every night.    bismuth subsalicylate (PEPTO BISMOL) 262 mg/15 mL suspension Take 15 mLs by mouth daily as needed (diarrhea).    carvediloL (COREG) 12.5 MG tablet Take 1 tablet (12.5 mg total) by mouth 2 (two) times daily. (Patient taking differently: Take 12.5 mg by mouth once daily.)    ciprofloxacin HCl (CIPRO) 500 MG tablet Take 1 tablet (500 mg total) by mouth 2 (two) times daily.    dapagliflozin (FARXIGA) 5 mg Tab tablet Take 1 tablet (5 mg total) by mouth once daily.    ibuprofen (ADVIL,MOTRIN) 200 MG tablet Take 400 mg by mouth daily as needed for Pain.    insulin lispro (HUMALOG U-100 INSULIN) 100 unit/mL injection Inject 100 Units into the skin continuous. Gives via insulin pump only. Do not Directly  "inject. (Patient taking differently: Inject 15 Units into the skin 3 (three) times daily before meals. Gives via insulin pump only. Do not Directly inject.)    lisinopriL (PRINIVIL,ZESTRIL) 40 MG tablet Take 1 tablet by mouth once daily    metFORMIN (GLUCOPHAGE) 1000 MG tablet Take 1 tablet (1,000 mg total) by mouth 2 (two) times daily with meals.    multivit-min/folic/vit K/lycop (MEN'S MULTIVITAMIN ORAL) Take 1 tablet by mouth once daily.    TRULICITY 1.5 mg/0.5 mL pen injector Inject 1.5 mg into the skin every 7 days. (Patient taking differently: Inject 1.5 mg into the skin every Wednesday.)    blood sugar diagnostic (ACCU-CHEK GUIDE TEST STRIPS) Strp 1 each by Misc.(Non-Drug; Combo Route) route 5 (five) times daily.    insulin (LANTUS SOLOSTAR U-100 INSULIN) glargine 100 units/mL SubQ pen Inject 50 Units into the skin once daily. USE AS BACK UP INSULIN ONLY - EMERGENCY USE IF YOUR ARE OFF OF YOUR INSULIN PUMP    lancets (ACCU-CHEK FASTCLIX LANCET DRUM) Misc 1 each by Misc.(Non-Drug; Combo Route) route Daily.    MINIMED 770G INSULIN PUMP Misc 1 each by Misc.(Non-Drug; Combo Route) route continuous. Medically necessary for management of Type 2 diabetes, E11.65    pen needle, diabetic 31 gauge x 3/16" Ndle Inject 1 each into the skin 3 (three) times daily before meals. (Patient taking differently: Inject 1 each into the skin 4 (four) times daily.)     Antibiotics (From admission, onward)      Start     Stop Route Frequency Ordered    11/27/22 1230  cefTRIAXone (ROCEPHIN) 2 g/50 mL D5W IVPB         -- IV Every 24 hours (non-standard times) 11/27/22 1128          Antifungals (From admission, onward)      None          Antivirals (From admission, onward)      None             Immunization History   Administered Date(s) Administered    COVID-19, MRNA, LN-S, PF (MODERNA FULL 0.5 ML DOSE) 03/30/2021, 04/28/2021    Influenza 01/18/2019    Influenza - Intradermal - Quadrivalent - PF 10/24/2013    Influenza - " Quadrivalent - PF *Preferred* (6 months and older) 11/08/2010, 09/28/2020    Pneumococcal Conjugate - 13 Valent 09/28/2020    Pneumococcal Polysaccharide - 23 Valent 06/29/2017    Tdap 09/08/2015    Zoster Recombinant 09/28/2020       Family History       Problem Relation (Age of Onset)    Diabetes Mother, Father, Maternal Grandmother, Maternal Grandfather, Paternal Grandmother, Paternal Grandfather    Heart disease Mother, Brother          Social History     Socioeconomic History    Marital status:    Tobacco Use    Smoking status: Never    Smokeless tobacco: Never   Substance and Sexual Activity    Alcohol use: Yes     Comment: rare x1 beer-     Drug use: No    Sexual activity: Not Currently     Review of Systems   Constitutional:  Negative for activity change, chills, diaphoresis, fatigue and fever.   HENT: Negative.     Respiratory:  Negative for cough, chest tightness and shortness of breath.    Cardiovascular:  Negative for chest pain and leg swelling.   Gastrointestinal:  Negative for abdominal pain, constipation, diarrhea, nausea and vomiting.   Genitourinary:  Negative for difficulty urinating, dysuria and frequency.   Musculoskeletal:  Positive for arthralgias (mild right foot pain). Negative for back pain.   Skin:  Positive for wound (right foot). Negative for rash.   Neurological:  Negative for dizziness, weakness, numbness and headaches.   Psychiatric/Behavioral:  Negative for agitation and confusion. The patient is not nervous/anxious.    Objective:     Vital Signs (Most Recent):  Temp: 97.7 °F (36.5 °C) (11/27/22 1120)  Pulse: (!) 59 (11/27/22 1120)  Resp: 18 (11/27/22 1120)  BP: 127/88 (11/27/22 1120)  SpO2: 97 % (11/27/22 1120)   Vital Signs (24h Range):  Temp:  [97.4 °F (36.3 °C)-98.1 °F (36.7 °C)] 97.7 °F (36.5 °C)  Pulse:  [53-59] 59  Resp:  [17-18] 18  SpO2:  [94 %-97 %] 97 %  BP: (127-145)/(67-88) 127/88     Weight: 127 kg (280 lb)  Body mass index is 35.95 kg/m².    Estimated  Creatinine Clearance: 142.5 mL/min (based on SCr of 0.8 mg/dL).    Physical Exam  Vitals reviewed.   Constitutional:       General: He is not in acute distress.     Appearance: Normal appearance. He is not ill-appearing, toxic-appearing or diaphoretic.   HENT:      Head: Normocephalic and atraumatic.      Nose: Nose normal. No congestion.   Eyes:      General: No scleral icterus.     Conjunctiva/sclera: Conjunctivae normal.   Cardiovascular:      Rate and Rhythm: Normal rate and regular rhythm.   Pulmonary:      Effort: Pulmonary effort is normal. No respiratory distress.      Breath sounds: No wheezing.   Musculoskeletal:         General: Swelling and deformity (L toe amputations) present.      Comments: Right foot wrapped/dressed. See podiatry photos in media tab   Skin:     General: Skin is warm and dry.      Findings: No rash.   Neurological:      Mental Status: He is alert and oriented to person, place, and time.   Psychiatric:         Mood and Affect: Mood normal.         Behavior: Behavior normal.         Thought Content: Thought content normal.         Judgment: Judgment normal.       Significant Labs: CBC:   Recent Labs   Lab 11/26/22  0734   WBC 10.13   HGB 13.6*   HCT 40.3        CMP:   Recent Labs   Lab 11/26/22  0734   *   K 4.5      CO2 22*   *   BUN 17   CREATININE 0.8   CALCIUM 9.0   PROT 7.0   ALBUMIN 3.2*   BILITOT 0.5   ALKPHOS 94   AST 54*   ALT 71*   ANIONGAP 10     Microbiology Results (last 7 days)       Procedure Component Value Units Date/Time    AFB Culture & Smear [[id number]] Collected: 11/25/22 2024    Order Status: Completed Specimen: Wound from Foot, Right Updated: 11/27/22 0927     AFB Culture & Smear Culture in progress    Narrative:      Bone, Right 3rd Toe    Aerobic culture [[id number]] Collected: 11/25/22 2024    Order Status: Completed Specimen: Wound from Foot, Right Updated: 11/27/22 0801     Aerobic Bacterial Culture No significant isolate to date     Narrative:      Bone, Right 3rd Toe    Blood culture #1 **CANNOT BE ORDERED STAT** [[id number]] Collected: 11/22/22 1153    Order Status: Completed Specimen: Blood from Peripheral, Forearm, Right Updated: 11/26/22 1412     Blood Culture, Routine No Growth to date      No Growth to date      No Growth to date      No Growth to date      No Growth to date    Blood culture #2 **CANNOT BE ORDERED STAT** [[id number]] Collected: 11/22/22 1153    Order Status: Completed Specimen: Blood from Peripheral, Forearm, Right Updated: 11/26/22 1412     Blood Culture, Routine No Growth to date      No Growth to date      No Growth to date      No Growth to date      No Growth to date    Gram stain [[id number]] Collected: 11/25/22 2024    Order Status: Completed Specimen: Wound from Foot, Right Updated: 11/25/22 2355     Gram Stain Result Rare WBC's      Rare Gram positive cocci    Narrative:      Bone, Right 3rd Toe    Culture, Anaerobe [[id number]] Collected: 11/25/22 2024    Order Status: Sent Specimen: Wound from Foot, Right Updated: 11/25/22 2056    Fungus culture [[id number]] Collected: 11/25/22 2024    Order Status: Sent Specimen: Wound from Foot, Right Updated: 11/25/22 2055          Recent Lab Results         11/27/22  0747   11/26/22 2001 11/26/22  1609        POCT Glucose 200   243   319               Significant Imaging:       Imaging Results              MRI Foot (Forefoot) Right W W/O Contrast (Final result)  Result time 11/22/22 19:41:11      Final result by Marky Day MD (11/22/22 19:41:11)                   Impression:      1. Findings most consistent with osteomyelitis involving the proximal middle phalanges of the 3rd toe.  There is associated ulceration along the medial aspect of the toe near the PIP joint, with fluid tracking dorsally.  No discrete rim enhancing fluid collection at this time.  Correlation is advised.      Electronically signed by: Marky Day MD  Date:    11/22/2022  Time:    19:41                Narrative:    EXAMINATION:  MRI FOOT (FOREFOOT) RIGHT W W/O CONTRAST    CLINICAL HISTORY:  Osteomyelitis, foot;    TECHNIQUE:  Multiplanar multisequence MRI images of the right foot were obtained pre and post the administration of 10 cc IV Gadavist.    COMPARISON:  Radiograph 11/22/2022    FINDINGS:  There is surgical change of transmetatarsal amputation of the 5th metatarsal, surgical margin appears sharp without abnormal edema or erosive/destructive change.  There is 2nd toe hammertoe deformity.  There is abnormal low T1 signal involving the proximal phalanx of the 3rd toe and middle phalanx of the 3rd toe.  This is associated with abnormal STIR signal throughout the proximal and middle phalanges of the 3rd toe.  Following contrast administration, there is abnormal enhancement of the proximal and middle phalanges of the 3rd toe most consistent with osteomyelitis.  No significant involvement of the distal phalanx of the 3rd toe.  There is diffuse edema about the 3rd toe with fluid like signal abnormality throughout.  There is an ulceration involving the lateral aspect of the 3rd toe, with fluid tracking into the deep soft tissues anteriorly.  No discrete rim enhancing fluid collection to suggest abscess.  There is reactive edema about the dorsal aspect of the foot near the base of the 3rd toe.  No abnormal enhancement elsewhere.                                       X-Ray Foot Complete Right (Final result)  Result time 11/22/22 12:58:55      Final result by Wilbert French MD (11/22/22 12:58:55)                   Impression:      See above      Electronically signed by: Wilbert French MD  Date:    11/22/2022  Time:    12:58               Narrative:    EXAMINATION:  XR FOOT COMPLETE 3 VIEW RIGHT    CLINICAL HISTORY:  . Pain in right foot    TECHNIQUE:  AP, lateral, and oblique views of the right foot were performed.    COMPARISON:  N 06/09/2022 one    FINDINGS:  Amputation of the 5th toe and distal aspect of  the 5th metatarsal bone identified as before.  DJD.  No acute fracture or bone destruction identified.  Bony spur seen arising from the plantar and dorsal aspect of the calcaneus.

## 2022-11-27 NOTE — ASSESSMENT & PLAN NOTE
Endocrinology consulted for BG management.   BG goal 140-180    - Patient does not have pump supplies and is not a good candidate for pump therapy at this time.     Re-weight-based at 0.5 units/kg/day (Basal/Prandial Matching) due to FBG and prandial BG above goal  - Levemir (Insulin Detemir) 17 units BID (20% increase due to FBG above goal)   - Novolog (Insulin Aspart) 12 units TIDWM and prn for BG excursions MDC SSI (150/25) (20% increase due to prandial BG above goal)   - BG checks AC/HS  - Hypoglycemia protocol in place      ** Please notify Endocrine for any change and/or advance in diet**  ** Please call Endocrine for any BG related issues **    Discharge Planning:   TBD. Please notify endocrinology prior to discharge.  - If discharged today would recommend resuming home insulin pump given BG improvement.

## 2022-11-27 NOTE — ASSESSMENT & PLAN NOTE
Right foot pain  Pt with 2 previous toe amputations for similar infections - left great toe and right 5th toe - most recent 06/2022. Previous cultures 06/2022 significant for Strept agalactiae (GBS) and Prevotella bivia. Cultures by Podiatry clinic 11/15/2022 significant for Staph aureus and Pasturella multocida. ESR & CRP elevated. WBC without leukocytosis  - current blood cultures NGTD  - Podiatry consulted, S/p amputation of 3rd R toe on 11/25.  - Was on ceftriaxone from pre-ampuation bone bx  - keep foot elevated and dry  - ID consulted - plan for 6 weeks ceftriaxone.

## 2022-11-27 NOTE — PROGRESS NOTES
Giuliano Botello - [facility] Surg  Podiatry  Progress Note    Patient Name: Billy Sheffield Miguel  MRN: [id number]  Admission Date: 11/22/2022  Hospital Length of Stay: 5 days  Attending Physician: Terrance Sierra MD  Primary Care Provider: BRAD Baker MD     Subjective:     Interval History: Patient is seen laying in bed. Patient says he feels good today. Patient does admit to mild pain on right foot. Patient denies fever chills nausea vomiting.  Patient admits he feels he has been walking and putting pressure on his right foot. Patient is stable at time of visit. Patient has no other pedal complaints at this time.     Follow-up For: Procedure(s) (LRB):  AMPUTATION, TOE (Right)    Post-Operative Day: 1 Days Post-Op    Scheduled Meds:   ascorbic acid (vitamin C)  500 mg Oral Daily    aspirin  81 mg Oral Daily    atorvastatin  80 mg Oral QHS    carvediloL  6.25 mg Oral BID    heparin (porcine)  5,000 Units Subcutaneous Q8H    insulin aspart U-100  10 Units Subcutaneous TIDWM    insulin detemir U-100  15 Units Subcutaneous BID    lisinopriL  40 mg Oral Daily     Continuous Infusions:  PRN Meds:acetaminophen, albuterol-ipratropium, bisacodyL, dextrose 10%, dextrose 10%, glucagon (human recombinant), glucose, glucose, haloperidol lactate, HYDROmorphone, insulin aspart U-100, melatonin, naloxone, ondansetron, oxyCODONE, oxyCODONE, polyethylene glycol, promethazine, sodium chloride 0.9%, sodium chloride 0.9%    Review of Systems   Constitutional:  Negative for activity change, chills, diaphoresis and fever.   HENT:  Negative for trouble swallowing.    Eyes:  Negative for photophobia and visual disturbance.   Respiratory:  Negative for cough, chest tightness, shortness of breath and wheezing.    Cardiovascular:  Negative for chest pain, palpitations and leg swelling.   Gastrointestinal:  Negative for abdominal pain, constipation, diarrhea, nausea and vomiting.   Genitourinary:  Negative for dysuria, frequency, hematuria and  urgency.   Musculoskeletal:  Negative for arthralgias, back pain and gait problem.   Skin:  Positive for wound (s/p amputation R 3rd toe). Negative for rash.   Neurological:  Negative for dizziness, syncope, weakness, light-headedness, numbness and headaches.   Psychiatric/Behavioral:  Negative for agitation and confusion. The patient is not nervous/anxious.    Objective:     Vital Signs (Most Recent):  Temp: 97.7 °F (36.5 °C) (11/26/22 2357)  Pulse: (!) 58 (11/26/22 2357)  Resp: 17 (11/26/22 2357)  BP: 128/67 (11/26/22 2357)  SpO2: (!) 94 % (11/26/22 2357)   Vital Signs (24h Range):  Temp:  [97.4 °F (36.3 °C)-97.8 °F (36.6 °C)] 97.7 °F (36.5 °C)  Pulse:  [47-63] 58  Resp:  [17-18] 17  SpO2:  [94 %-99 %] 94 %  BP: (128-145)/(67-81) 128/67     Weight: 127 kg (280 lb)  Body mass index is 35.95 kg/m².    Foot Exam    General  Orientation: alert and oriented to person, place, and time       Right Foot/Ankle     Inspection and Palpation  Tenderness: none   Swelling: (3rd toe)  Skin Exam: callus, drainage, dry skin, cellulitis, skin changes, abnormal color, ulcer and erythema; skin not intact     Neurovascular  Dorsalis pedis: 1+  Posterior tibial: doppler  Saphenous nerve sensation: absent  Tibial nerve sensation: absent  Superficial peroneal nerve sensation: absent  Deep peroneal nerve sensation: absent  Sural nerve sensation: absent    Comments  -Partial 5th ray amputated  -3rd toe s/p amputation. Dressings has some bleeding noticed. Surgical site was partially left open. Retention sutures are intact. Wound  measures 4cm x 2.5 cm x 3cm. Wound bed has some bleeding noticed. No purulent drainage noticed.   -2nd metatarsal head plantar wound down to bone, fibrotic wound bed, serosanguinous drainage. Not tender.     Left Foot/Ankle      Inspection and Palpation  Tenderness: none   Swelling: none   Skin Exam: callus and dry skin; no drainage, no cellulitis, no ulcer and no erythema     Neurovascular  Dorsalis pedis:  1+  Posterior tibial: doppler  Saphenous nerve sensation: absent  Tibial nerve sensation: absent  Superficial peroneal nerve sensation: absent  Deep peroneal nerve sensation: absent  Sural nerve sensation: absent    Comments  -Partial 1st ray amputated              Laboratory:  A1C:   Recent Labs   Lab 06/08/22  0036 08/09/22  1634 11/10/22  0835   HGBA1C 10.0* 8.0* 7.1*     Blood Cultures: No results for input(s): LABBLOO in the last 48 hours.  CBC:   Recent Labs   Lab 11/26/22  0734   WBC 10.13   RBC 4.55*   HGB 13.6*   HCT 40.3      MCV 89   MCH 29.9   MCHC 33.7     CMP:   Recent Labs   Lab 11/26/22  0734   *   CALCIUM 9.0   ALBUMIN 3.2*   PROT 7.0   *   K 4.5   CO2 22*      BUN 17   CREATININE 0.8   ALKPHOS 94   ALT 71*   AST 54*   BILITOT 0.5     Coagulation: No results for input(s): PT, INR, APTT in the last 168 hours.  CRP:   Recent Labs   Lab 11/22/22  1154   CRP 15.6*     ESR:   Recent Labs   Lab 11/22/22  1154   SEDRATE 56*     Prealbumin: No results for input(s): PREALBUMIN in the last 48 hours.  Wound Cultures:   Recent Labs   Lab 06/08/22  0206 06/08/22  1231 06/09/22  1514 11/15/22  1654   LABAERO KLEBSIELLA PNEUMONIAE ESBL  Many  *  STREPTOCOCCUS AGALACTIAE (GROUP B)  Many  Beta-hemolytic streptococci are routinely susceptible to   penicillins,cephalosporins and carbapenems.  Susceptibility testing not routinely performed  Skin josselin also present  * STREPTOCOCCUS AGALACTIAE (GROUP B)  Many  Beta-hemolytic streptococci are routinely susceptible to   penicillins,cephalosporins and carbapenems.  Susceptibility testing not routinely performed  *  KLEBSIELLA PNEUMONIAE ESBL  Few  * STREPTOCOCCUS AGALACTIAE (GROUP B)  Rare  Beta-hemolytic streptococci are routinely susceptible to   penicillins,cephalosporins and carbapenems.  Susceptibility testing not routinely performed  * STAPHYLOCOCCUS AUREUS  Many  *  PASTEURELLA MULTOCIDA   Many  Susceptibility testing not routinely  performed  *     Microbiology Results (last 7 days)       Procedure Component Value Units Date/Time    Blood culture #1 **CANNOT BE ORDERED STAT** [[id number]] Collected: 11/22/22 1153    Order Status: Completed Specimen: Blood from Peripheral, Forearm, Right Updated: 11/26/22 1412     Blood Culture, Routine No Growth to date      No Growth to date      No Growth to date      No Growth to date      No Growth to date    Blood culture #2 **CANNOT BE ORDERED STAT** [[id number]] Collected: 11/22/22 1153    Order Status: Completed Specimen: Blood from Peripheral, Forearm, Right Updated: 11/26/22 1412     Blood Culture, Routine No Growth to date      No Growth to date      No Growth to date      No Growth to date      No Growth to date    Gram stain [[id number]] Collected: 11/25/22 2024    Order Status: Completed Specimen: Wound from Foot, Right Updated: 11/25/22 2355     Gram Stain Result Rare WBC's      Rare Gram positive cocci    Narrative:      Bone, Right 3rd Toe    Culture, Anaerobe [[id number]] Collected: 11/25/22 2024    Order Status: Sent Specimen: Wound from Foot, Right Updated: 11/25/22 2056    AFB Culture & Smear [[id number]] Collected: 11/25/22 2024    Order Status: Sent Specimen: Wound from Foot, Right Updated: 11/25/22 2056    Aerobic culture [[id number]] Collected: 11/25/22 2024    Order Status: Sent Specimen: Wound from Foot, Right Updated: 11/25/22 2055    Fungus culture [[id number]] Collected: 11/25/22 2024    Order Status: Sent Specimen: Wound from Foot, Right Updated: 11/25/22 2055          Specimen (24h ago, onward)      None              Assessment/Plan:     * Diabetic foot infection  Assessment: IDSA moderate diabetic foot infection with right 3rd toe osteomyelitis and possible 2nd MTPJ osteomyelitis. X ray unremarkable. MRI + for right 3rd middle and proximal phalanx OM. Bone culture + for Staph aureus and Pasteurella. Pedal pulses diminished with R CODY 1.1 and L VASILIY 1.2. patient now s/p right 3rd toe  amputation.     Plan:  - s/p right 3rd toe amputation POD 1  - Intraop cultures pending  -Antibiotic plan per primary team.   -RLE WB to heel in surgical shoe for transfers or short distances. LLE WBAT.  -Dressing changes by nursing ordered.   -Podiatry will follow.         Misael Parsons DPM PGY-1  Podiatric Medicine & Surgery  [facility]  Secure Chat Preferred  Mobile: [phone]  Pager: [phone]

## 2022-11-27 NOTE — SUBJECTIVE & OBJECTIVE
Past Medical History:   Diagnosis Date    Allergy     CAD (coronary artery disease), native coronary artery 6/25/2013    Cancer     COVID-19 virus detected 9/1/2020    Diabetes mellitus     Diabetes mellitus, type 2     Disorder of kidney and ureter     Heart attack 04/2012    Hx of colon cancer, stage I     Hyperlipidemia     Hypertension     Muscular pain     post-op after colonoscopy    NSTEMI May 2013 - peak troponin 0.22 5/22/2013    MICHAELA (obstructive sleep apnea)     Retinopathy due to secondary diabetes        Past Surgical History:   Procedure Laterality Date    COLONOSCOPY N/A 2/17/2017    Procedure: COLONOSCOPY;  Surgeon: Simon Gomez MD;  Location: [facility] ENDO (4TH FLR);  Service: Endoscopy;  Laterality: N/A;    CORONARY ANGIOPLASTY      INCISION AND DRAINAGE FOOT Right 6/5/2018    Procedure: INCISION AND DRAINAGE, FOOT;  Surgeon: Bridget Santana DPM;  [facility];  Service: Podiatry;  Laterality: Right;  Request mini C arm in room. request wound vac with medium black sponge    INCISION AND DRAINAGE FOOT Right 6/7/2018    Procedure: INCISION AND DRAINAGE, FOOT;  Surgeon: Emelia Brock DPM;  Location: [facility] OR 2ND FLR;  Service: Podiatry;  Laterality: Right;    INCISION AND DRAINAGE FOOT Left 9/4/2020    Procedure: INCISION AND DRAINAGE, FOOT;  Surgeon: Ainsley Powell DPM;  [facility];  Service: Podiatry;  Laterality: Left;    INCISION AND DRAINAGE FOOT Left 12/22/2020    Procedure: INCISION AND DRAINAGE, FOOT;  Surgeon: Ainsley Powell DPM;  Location: [facility] OR [address] [place] [address];  Service: Podiatry;  Laterality: Left;  May need micro choice  Need Jam shidi needles  Stretcher OK      INCISION AND DRAINAGE OF ABSCESS Right 6/2/2018    Procedure: INCISION AND DRAINAGE, ABSCESS;  Surgeon: Bridget Santana DPM;  Location: [facility] OR [address] [place] [address];  Service: General;  Laterality: Right;    OSTEOTOMY OF METATARSAL BONE  9/4/2020    Procedure: OSTEOTOMY, METATARSAL BONE, 1st METATARSAL RESECTION;   Surgeon: Ainsley Powell DPM;  Location: [facility] OR 2ND FLR;  Service: Podiatry;;    REMOVAL OF FOREIGN BODY FROM FOOT Right 6/7/2018    Procedure: REMOVAL, FOREIGN BODY, FOOT;  Surgeon: Emelia Brock DPM;  Location: [facility] OR 2ND FLR;  Service: Podiatry;  Laterality: Right;    TOE AMPUTATION Left 7/4/2020    Procedure: AMPUTATION, TOE;  Surgeon: Bridget Santana DPM;  Location: [facility] OR 2ND FLR;  Service: Podiatry;  Laterality: Left;    TOE AMPUTATION Left 10/14/2020    Procedure: AMPUTATION, TOE;  Surgeon: Mingo Melara DPM;  Location: [facility] OR 2ND FLR;  Service: Podiatry;  Laterality: Left;  stretcher OK    TOE AMPUTATION Right 6/9/2022    Procedure: AMPUTATION, TOE - Dr. Melara @ [place] in am;  Surgeon: Mingo Melara DPM;  Location: [facility] OR 1ST FLR;  Service: Podiatry;  Laterality: Right;    TOE AMPUTATION Right 11/23/2022    Procedure: AMPUTATION, TOE;  Surgeon: Hansa Robertson DPM;  Location: [facility] OR 2ND FLR;  Service: Podiatry;  Laterality: Right;    TOE AMPUTATION Right 11/25/2022    Procedure: AMPUTATION, TOE;  Surgeon: Chris Groves DPM;  Location: [facility] OR 2ND FLR;  Service: Podiatry;  Laterality: Right;    TONSILLECTOMY         Review of patient's allergies indicates:   Allergen Reactions    Penicillins Other (See Comments)     PCN allergy as a child - was told he went into a coma. Tolerates Cefazolin without adverse reactions    Shellfish containing products      Other reaction(s): Unknown    Vancomycin Itching     Tolerated vancomycin 7/2020    Bactrim  [sulfamethoxazole-trimethoprim] Rash       Medications:  Medications Prior to Admission   Medication Sig    acetaminophen (TYLENOL) 500 MG tablet Take 1,000 mg by mouth daily as needed for Pain.    ascorbic acid, vitamin C, (VITAMIN C) 250 MG tablet Take 500 mg by mouth once daily.    aspirin (ECOTRIN) 81 MG EC tablet Take 1 tablet (81 mg total) by mouth once daily.    aspirin 325 MG tablet Take 325 mg by mouth daily as needed for Pain.     atorvastatin (LIPITOR) 80 MG tablet Take 1 tablet (80 mg total) by mouth once daily. Take every night.    bismuth subsalicylate (PEPTO BISMOL) 262 mg/15 mL suspension Take 15 mLs by mouth daily as needed (diarrhea).    carvediloL (COREG) 12.5 MG tablet Take 1 tablet (12.5 mg total) by mouth 2 (two) times daily. (Patient taking differently: Take 12.5 mg by mouth once daily.)    ciprofloxacin HCl (CIPRO) 500 MG tablet Take 1 tablet (500 mg total) by mouth 2 (two) times daily.    dapagliflozin (FARXIGA) 5 mg Tab tablet Take 1 tablet (5 mg total) by mouth once daily.    ibuprofen (ADVIL,MOTRIN) 200 MG tablet Take 400 mg by mouth daily as needed for Pain.    insulin lispro (HUMALOG U-100 INSULIN) 100 unit/mL injection Inject 100 Units into the skin continuous. Gives via insulin pump only. Do not Directly inject. (Patient taking differently: Inject 15 Units into the skin 3 (three) times daily before meals. Gives via insulin pump only. Do not Directly inject.)    lisinopriL (PRINIVIL,ZESTRIL) 40 MG tablet Take 1 tablet by mouth once daily    metFORMIN (GLUCOPHAGE) 1000 MG tablet Take 1 tablet (1,000 mg total) by mouth 2 (two) times daily with meals.    multivit-min/folic/vit K/lycop (MEN'S MULTIVITAMIN ORAL) Take 1 tablet by mouth once daily.    TRULICITY 1.5 mg/0.5 mL pen injector Inject 1.5 mg into the skin every 7 days. (Patient taking differently: Inject 1.5 mg into the skin every Wednesday.)    blood sugar diagnostic (ACCU-CHEK GUIDE TEST STRIPS) Strp 1 each by Misc.(Non-Drug; Combo Route) route 5 (five) times daily.    insulin (LANTUS SOLOSTAR U-100 INSULIN) glargine 100 units/mL SubQ pen Inject 50 Units into the skin once daily. USE AS BACK UP INSULIN ONLY - EMERGENCY USE IF YOUR ARE OFF OF YOUR INSULIN PUMP    lancets (ACCU-CHEK FASTCLIX LANCET DRUM) Misc 1 each by Misc.(Non-Drug; Combo Route) route Daily.    MINIMED 770G INSULIN PUMP Misc 1 each by Misc.(Non-Drug; Combo Route) route continuous. Medically necessary  "for management of Type 2 diabetes, E11.65    pen needle, diabetic 31 gauge x 3/16" Ndle Inject 1 each into the skin 3 (three) times daily before meals. (Patient taking differently: Inject 1 each into the skin 4 (four) times daily.)     Antibiotics (From admission, onward)      Start     Stop Route Frequency Ordered    11/27/22 1230  cefTRIAXone (ROCEPHIN) 2 g/50 mL D5W IVPB         -- IV Every 24 hours (non-standard times) 11/27/22 1128          Antifungals (From admission, onward)      None          Antivirals (From admission, onward)      None             Immunization History   Administered Date(s) Administered    COVID-19, MRNA, LN-S, PF (MODERNA FULL 0.5 ML DOSE) 03/30/2021, 04/28/2021    Influenza 01/18/2019    Influenza - Intradermal - Quadrivalent - PF 10/24/2013    Influenza - Quadrivalent - PF *Preferred* (6 months and older) 11/08/2010, 09/28/2020    Pneumococcal Conjugate - 13 Valent 09/28/2020    Pneumococcal Polysaccharide - 23 Valent 06/29/2017    Tdap 09/08/2015    Zoster Recombinant 09/28/2020       Family History       Problem Relation (Age of Onset)    Diabetes Mother, Father, Maternal Grandmother, Maternal Grandfather, Paternal Grandmother, Paternal Grandfather    Heart disease Mother, Brother          Social History     Socioeconomic History    Marital status:    Tobacco Use    Smoking status: Never    Smokeless tobacco: Never   Substance and Sexual Activity    Alcohol use: Yes     Comment: rare x1 beer-     Drug use: No    Sexual activity: Not Currently     Review of Systems   Constitutional:  Negative for activity change, chills, diaphoresis, fatigue and fever.   HENT: Negative.     Respiratory:  Negative for cough, chest tightness and shortness of breath.    Cardiovascular:  Negative for chest pain and leg swelling.   Gastrointestinal:  Negative for abdominal pain, constipation, diarrhea, nausea and vomiting.   Genitourinary:  Negative for difficulty urinating, dysuria and frequency. "   Musculoskeletal:  Positive for arthralgias (mild right foot pain). Negative for back pain.   Skin:  Positive for wound (right foot). Negative for rash.   Neurological:  Negative for dizziness, weakness, numbness and headaches.   Psychiatric/Behavioral:  Negative for agitation and confusion. The patient is not nervous/anxious.    Objective:     Vital Signs (Most Recent):  Temp: 97.7 °F (36.5 °C) (11/27/22 1120)  Pulse: (!) 59 (11/27/22 1120)  Resp: 18 (11/27/22 1120)  BP: 127/88 (11/27/22 1120)  SpO2: 97 % (11/27/22 1120)   Vital Signs (24h Range):  Temp:  [97.4 °F (36.3 °C)-98.1 °F (36.7 °C)] 97.7 °F (36.5 °C)  Pulse:  [53-59] 59  Resp:  [17-18] 18  SpO2:  [94 %-97 %] 97 %  BP: (127-145)/(67-88) 127/88     Weight: 127 kg (280 lb)  Body mass index is 35.95 kg/m².    Estimated Creatinine Clearance: 142.5 mL/min (based on SCr of 0.8 mg/dL).    Physical Exam  Vitals reviewed.   Constitutional:       General: He is not in acute distress.     Appearance: Normal appearance. He is not ill-appearing, toxic-appearing or diaphoretic.   HENT:      Head: Normocephalic and atraumatic.      Nose: Nose normal. No congestion.   Eyes:      General: No scleral icterus.     Conjunctiva/sclera: Conjunctivae normal.   Cardiovascular:      Rate and Rhythm: Normal rate and regular rhythm.   Pulmonary:      Effort: Pulmonary effort is normal. No respiratory distress.      Breath sounds: No wheezing.   Musculoskeletal:         General: Swelling and deformity (L toe amputations) present.      Comments: Right foot wrapped/dressed. See podiatry photos in media tab   Skin:     General: Skin is warm and dry.      Findings: No rash.   Neurological:      Mental Status: He is alert and oriented to person, place, and time.   Psychiatric:         Mood and Affect: Mood normal.         Behavior: Behavior normal.         Thought Content: Thought content normal.         Judgment: Judgment normal.       Significant Labs: CBC:   Recent Labs   Lab  11/26/22  0734   WBC 10.13   HGB 13.6*   HCT 40.3        CMP:   Recent Labs   Lab 11/26/22  0734   *   K 4.5      CO2 22*   *   BUN 17   CREATININE 0.8   CALCIUM 9.0   PROT 7.0   ALBUMIN 3.2*   BILITOT 0.5   ALKPHOS 94   AST 54*   ALT 71*   ANIONGAP 10     Microbiology Results (last 7 days)       Procedure Component Value Units Date/Time    AFB Culture & Smear [[id number]] Collected: 11/25/22 2024    Order Status: Completed Specimen: Wound from Foot, Right Updated: 11/27/22 0927     AFB Culture & Smear Culture in progress    Narrative:      Bone, Right 3rd Toe    Aerobic culture [[id number]] Collected: 11/25/22 2024    Order Status: Completed Specimen: Wound from Foot, Right Updated: 11/27/22 0801     Aerobic Bacterial Culture No significant isolate to date    Narrative:      Bone, Right 3rd Toe    Blood culture #1 **CANNOT BE ORDERED STAT** [[id number]] Collected: 11/22/22 1153    Order Status: Completed Specimen: Blood from Peripheral, Forearm, Right Updated: 11/26/22 1412     Blood Culture, Routine No Growth to date      No Growth to date      No Growth to date      No Growth to date      No Growth to date    Blood culture #2 **CANNOT BE ORDERED STAT** [[id number]] Collected: 11/22/22 1153    Order Status: Completed Specimen: Blood from Peripheral, Forearm, Right Updated: 11/26/22 1412     Blood Culture, Routine No Growth to date      No Growth to date      No Growth to date      No Growth to date      No Growth to date    Gram stain [[id number]] Collected: 11/25/22 2024    Order Status: Completed Specimen: Wound from Foot, Right Updated: 11/25/22 2355     Gram Stain Result Rare WBC's      Rare Gram positive cocci    Narrative:      Bone, Right 3rd Toe    Culture, Anaerobe [[id number]] Collected: 11/25/22 2024    Order Status: Sent Specimen: Wound from Foot, Right Updated: 11/25/22 2056    Fungus culture [[id number]] Collected: 11/25/22 2024    Order Status: Sent Specimen: Wound from Foot,  Right Updated: 11/25/22 2055          Recent Lab Results         11/27/22  0747   11/26/22 2001 11/26/22  1609        POCT Glucose 200   243   319               Significant Imaging:       Imaging Results              MRI Foot (Forefoot) Right W W/O Contrast (Final result)  Result time 11/22/22 19:41:11      Final result by Marky Day MD (11/22/22 19:41:11)                   Impression:      1. Findings most consistent with osteomyelitis involving the proximal middle phalanges of the 3rd toe.  There is associated ulceration along the medial aspect of the toe near the PIP joint, with fluid tracking dorsally.  No discrete rim enhancing fluid collection at this time.  Correlation is advised.      Electronically signed by: Marky Day MD  Date:    11/22/2022  Time:    19:41               Narrative:    EXAMINATION:  MRI FOOT (FOREFOOT) RIGHT W W/O CONTRAST    CLINICAL HISTORY:  Osteomyelitis, foot;    TECHNIQUE:  Multiplanar multisequence MRI images of the right foot were obtained pre and post the administration of 10 cc IV Gadavist.    COMPARISON:  Radiograph 11/22/2022    FINDINGS:  There is surgical change of transmetatarsal amputation of the 5th metatarsal, surgical margin appears sharp without abnormal edema or erosive/destructive change.  There is 2nd toe hammertoe deformity.  There is abnormal low T1 signal involving the proximal phalanx of the 3rd toe and middle phalanx of the 3rd toe.  This is associated with abnormal STIR signal throughout the proximal and middle phalanges of the 3rd toe.  Following contrast administration, there is abnormal enhancement of the proximal and middle phalanges of the 3rd toe most consistent with osteomyelitis.  No significant involvement of the distal phalanx of the 3rd toe.  There is diffuse edema about the 3rd toe with fluid like signal abnormality throughout.  There is an ulceration involving the lateral aspect of the 3rd toe, with fluid tracking into the deep soft  tissues anteriorly.  No discrete rim enhancing fluid collection to suggest abscess.  There is reactive edema about the dorsal aspect of the foot near the base of the 3rd toe.  No abnormal enhancement elsewhere.                                       X-Ray Foot Complete Right (Final result)  Result time 11/22/22 12:58:55      Final result by Wilbert French MD (11/22/22 12:58:55)                   Impression:      See above      Electronically signed by: Wilbert French MD  Date:    11/22/2022  Time:    12:58               Narrative:    EXAMINATION:  XR FOOT COMPLETE 3 VIEW RIGHT    CLINICAL HISTORY:  . Pain in right foot    TECHNIQUE:  AP, lateral, and oblique views of the right foot were performed.    COMPARISON:  N 06/09/2022 one    FINDINGS:  Amputation of the 5th toe and distal aspect of the 5th metatarsal bone identified as before.  DJD.  No acute fracture or bone destruction identified.  Bony spur seen arising from the plantar and dorsal aspect of the calcaneus.

## 2022-11-27 NOTE — ASSESSMENT & PLAN NOTE
Patient's FSGs are uncontrolled.  Maintain anti-hyperglycemic dose as follows-   Endocrinology consulted and managing:  Antihyperglycemics (From admission, onward)    Start     Stop Route Frequency Ordered    11/27/22 1130  insulin aspart U-100 pen 12 Units         -- SubQ 3 times daily with meals 11/27/22 0807    11/27/22 0907  insulin aspart U-100 pen 1-10 Units         -- SubQ Before meals & nightly PRN 11/27/22 0807    11/27/22 0900  insulin detemir U-100 pen 17 Units         -- SubQ 2 times daily 11/27/22 0807

## 2022-11-27 NOTE — SUBJECTIVE & OBJECTIVE
Telemedicine  This service was provided by [facility].    Patient was transferred to [facility] on:  11/27/2022    Chief Complaint   Patient presents with    Wound Check     States Dr. Ellis middle toe on R foot amputated. Told to come to ED. Denies pain.      The patient location is: 603/603 A   Admitted 11/22/2022 11:08 AM    Interval History / Events Overnight:   The patient is able to provide adequate history. Additional history was obtained from past medical records. No significant events reported by Nursing. Patient non-adherent to minimal ambulation and NWB of forefoot.  Patient complains of nothing specific. Symptoms have improved since yesterday. Associated symptoms include: fatigue. Symptoms are stable.     Lab test(s) reviewed: hyperglycemia    Review of Systems   Constitutional:  Negative for fever.   Respiratory:  Negative for shortness of breath.      Objective:     Vital Signs (Most Recent):  Temp: 98.1 °F (36.7 °C) (11/27/22 0729)  Pulse: (!) 55 (11/27/22 0729)  Resp: 18 (11/27/22 0729)  BP: 134/79 (11/27/22 0729)  SpO2: 96 % (11/27/22 0729)   Vital Signs (24h Range):  Temp:  [97.4 °F (36.3 °C)-98.1 °F (36.7 °C)] 98.1 °F (36.7 °C)  Pulse:  [52-58] 55  Resp:  [17-18] 18  SpO2:  [94 %-99 %] 96 %  BP: (128-145)/(67-81) 134/79     Weight: 127 kg (280 lb)  Body mass index is 35.95 kg/m².  No intake or output data in the 24 hours ending 11/27/22 1015   Physical Exam  Constitutional:       General: He is not in acute distress.     Appearance: Normal appearance.   Eyes:      General: Lids are normal. No scleral icterus.        Right eye: No discharge.         Left eye: No discharge.      Conjunctiva/sclera: Conjunctivae normal.   Neck:      Trachea: Phonation normal.   Cardiovascular:      Rate and Rhythm: Bradycardia present.      Comments: Monitor / Vital signs reviewed at time of visit  Pulmonary:      Effort: Pulmonary effort is normal. No tachypnea, accessory muscle usage or  respiratory distress.   Abdominal:      General: There is no distension.   Skin:     Coloration: Skin is not cyanotic.   Neurological:      Mental Status: He is alert. He is not disoriented.   Psychiatric:         Attention and Perception: Attention normal.         Mood and Affect: Affect normal.         Behavior: Behavior is cooperative.       Significant Labs:  Recent Labs   Lab 06/08/22  0036 08/09/22  1634 11/10/22  0835   HGBA1C 10.0* 8.0* 7.1*     Recent Labs   Lab 11/26/22  1609 11/26/22 2001 11/27/22  0747   POCTGLUCOSE 319* 243* 200*     Recent Labs   Lab 11/22/22  1154 11/26/22  0734   WBC 10.62 10.13   HGB 14.7 13.6*   HCT 43.2 40.3    247     Recent Labs   Lab 11/22/22  1154 11/26/22  0734   GRAN 67.8  7.2 71.3  7.2   LYMPH 17.9*  1.9 15.4*  1.6   MONO 10.5  1.1* 8.6  0.9   EOS 0.3 0.3     Recent Labs   Lab 11/23/22  0253 11/24/22 0320 11/25/22 0336 11/26/22  0734   * 131* 130* 134*   K 4.4 4.1 4.4 4.5    100 100 102   CO2 23 24 22* 22*   BUN 19 22* 21* 17   CREATININE 0.9 1.0 0.9 0.8   * 248* 331* 205*   CALCIUM 9.4 8.4* 8.6* 9.0   ALBUMIN 3.0* 2.9* 3.0* 3.2*   MG 1.7 1.9 1.8  --    PHOS 3.5 3.3 2.7  --      Recent Labs   Lab 11/22/22  1154 11/23/22  0253 11/24/22  0320 11/25/22  0336 11/26/22  0734   ALKPHOS 97   < > 80 86 94   ALT 26   < > 21 25 71*   AST 21   < > 15 16 54*   PROT 7.7   < > 6.3 6.5 7.0   BILITOT 0.6   < > 0.4 0.3 0.5   CRP 15.6*  --   --   --   --     < > = values in this interval not displayed.     Recent Labs   Lab 06/08/22  0036 11/22/22  1154 11/22/22 2001   LACTATE  --  2.3* 0.8   SEDRATE 78* 56*  --      SARS-CoV-2 RNA, Amplification, Qual (no units)   Date Value   11/22/2022 Negative   09/01/2020 Positive (A)   07/03/2020 Negative     POC Rapid COVID (no units)   Date Value   06/08/2022 Negative   12/21/2020 Negative   10/12/2020 Negative       X-Ray Foot Complete Right  Narrative: EXAMINATION:  XR FOOT COMPLETE 3 VIEW RIGHT    CLINICAL  HISTORY:  post op;. Subacute osteomyelitis, right ankle and foot    TECHNIQUE:  AP, lateral, and oblique views of the right foot were performed.    COMPARISON:  Radiographic examination of the right foot November 22, 2022    FINDINGS:  As on the prior examination there is amputation of the mid to distal 5th metatarsal, phalanges and toe.  In the interim since the prior examination there has been amputation of the phalanges and toe of the 3rd digit.  There are changes of the soft tissues that may relate to recent postoperative change including appearance of swelling and air density within the soft tissues.    The remainder of the visualized osseous structures demonstrate chronic appearing change and appears stable.  Impression: Postoperative changes are noted, as discussed above.    Electronically signed by: Mingo Adan  Date:    11/25/2022  Time:    23:03      Labs and Imaging within the last 24 hours listed above were reviewed.       Diet: Diet diabetic [facility]; 2000 Calorie  Significant LDAs:   IV Access Type: Peripheral  Urinary Catheter Indication if present: Patient Does Not Have Urinary Catheter  Other Lines/Tubes/Drains:      Goals of Care:    Previous admission:  6/7/22  Likely prognosis:  Fair  Code Status: Full Code  Comfort Only: No  Hospice: No  Goals at discharge: remain at home, with physician follow-up    Discharge Planning   DARVIN: 11/29/2022     Code Status: Full Code   Is the patient medically ready for discharge?: No    Reason for patient still in hospital (select all that apply): Patient trending condition and Pending disposition  Discharge Plan A: Home Health   Discharge Delays: None known at this time

## 2022-11-27 NOTE — HPI
Mr. Rivera is a 57-year-old man with CAD, HTN, DM2, recurrent diabetic foot infection, osteomyelitis of the left foot s/p left hallux amputation and partial left 2nd toe amputation (2020) and right 5th ray amputation admitted for right third toe infection.     Patient is followed closely in podiatry clinic. On 11/15 underwent right third toe bone biopsy and cultures grew MSSA and Pasturella. He was started on doxy/cipro. On 11/22 he followed up in clinic and there was increased redness, swelling and malodor to the right 3rd digit. He also has a second met head plantar foot  wound that probes to bone. He was admitted for further evaluation and management. Patient started on empiric Vancomycin and Ceftriaxone.  MRI showed osteomyelitis involving the proximal middle phalanges of the 3rd toe.  There is associated ulceration along the medial aspect of the toe near the PIP joint, with fluid tracking dorsally.  No discrete rim enhancing fluid collection. Podiatry took patient to the OR on 11/25 for right third toe amputation. Surgical cx are in process (It does not appear clean margin bone cultures were sent). Gram stain with GPCs. ID consulted for antibiotic recommendations. Patient is overall doing well. Afebrile. Reports needing to be discharged soon to take care of his wife who broke her foot.

## 2022-11-28 ENCOUNTER — TELEPHONE (OUTPATIENT)
Dept: DIABETES | Facility: CLINIC | Age: 57
End: 2022-11-28
Payer: COMMERCIAL

## 2022-11-28 ENCOUNTER — TELEPHONE (OUTPATIENT)
Dept: INTERNAL MEDICINE | Facility: CLINIC | Age: 57
End: 2022-11-28
Payer: COMMERCIAL

## 2022-11-28 ENCOUNTER — TELEPHONE (OUTPATIENT)
Dept: PODIATRY | Facility: CLINIC | Age: 57
End: 2022-11-28
Payer: COMMERCIAL

## 2022-11-28 VITALS
OXYGEN SATURATION: 96 % | RESPIRATION RATE: 18 BRPM | HEIGHT: 74 IN | SYSTOLIC BLOOD PRESSURE: 135 MMHG | TEMPERATURE: 99 F | BODY MASS INDEX: 34.46 KG/M2 | WEIGHT: 268.5 LBS | HEART RATE: 60 BPM | DIASTOLIC BLOOD PRESSURE: 81 MMHG

## 2022-11-28 LAB
ALBUMIN SERPL BCP-MCNC: 3.1 G/DL (ref 3.5–5.2)
ALP SERPL-CCNC: 97 U/L (ref 55–135)
ALT SERPL W/O P-5'-P-CCNC: 54 U/L (ref 10–44)
ANION GAP SERPL CALC-SCNC: 8 MMOL/L (ref 8–16)
AST SERPL-CCNC: 23 U/L (ref 10–40)
BACTERIA SPEC AEROBE CULT: ABNORMAL
BASOPHILS # BLD AUTO: 0.06 K/UL (ref 0–0.2)
BASOPHILS NFR BLD: 0.7 % (ref 0–1.9)
BILIRUB SERPL-MCNC: 0.7 MG/DL (ref 0.1–1)
BUN SERPL-MCNC: 16 MG/DL (ref 6–20)
CALCIUM SERPL-MCNC: 9.1 MG/DL (ref 8.7–10.5)
CHLORIDE SERPL-SCNC: 102 MMOL/L (ref 95–110)
CO2 SERPL-SCNC: 23 MMOL/L (ref 23–29)
CREAT SERPL-MCNC: 0.9 MG/DL (ref 0.5–1.4)
CRP SERPL-MCNC: 11.8 MG/L (ref 0–8.2)
DIFFERENTIAL METHOD: ABNORMAL
EOSINOPHIL # BLD AUTO: 0.3 K/UL (ref 0–0.5)
EOSINOPHIL NFR BLD: 3.5 % (ref 0–8)
ERYTHROCYTE [DISTWIDTH] IN BLOOD BY AUTOMATED COUNT: 12.5 % (ref 11.5–14.5)
EST. GFR  (NO RACE VARIABLE): >60 ML/MIN/1.73 M^2
GLUCOSE SERPL-MCNC: 230 MG/DL (ref 70–110)
HCT VFR BLD AUTO: 41.5 % (ref 40–54)
HGB BLD-MCNC: 14.3 G/DL (ref 14–18)
IMM GRANULOCYTES # BLD AUTO: 0.09 K/UL (ref 0–0.04)
IMM GRANULOCYTES NFR BLD AUTO: 1.1 % (ref 0–0.5)
LYMPHOCYTES # BLD AUTO: 1.7 K/UL (ref 1–4.8)
LYMPHOCYTES NFR BLD: 19.8 % (ref 18–48)
MCH RBC QN AUTO: 30 PG (ref 27–31)
MCHC RBC AUTO-ENTMCNC: 34.5 G/DL (ref 32–36)
MCV RBC AUTO: 87 FL (ref 82–98)
MONOCYTES # BLD AUTO: 0.9 K/UL (ref 0.3–1)
MONOCYTES NFR BLD: 10.2 % (ref 4–15)
NEUTROPHILS # BLD AUTO: 5.5 K/UL (ref 1.8–7.7)
NEUTROPHILS NFR BLD: 64.7 % (ref 38–73)
NRBC BLD-RTO: 0 /100 WBC
PLATELET # BLD AUTO: 257 K/UL (ref 150–450)
PMV BLD AUTO: 10.2 FL (ref 9.2–12.9)
POCT GLUCOSE: 104 MG/DL (ref 70–110)
POCT GLUCOSE: 220 MG/DL (ref 70–110)
POCT GLUCOSE: 229 MG/DL (ref 70–110)
POTASSIUM SERPL-SCNC: 4.8 MMOL/L (ref 3.5–5.1)
PROT SERPL-MCNC: 7.1 G/DL (ref 6–8.4)
RBC # BLD AUTO: 4.77 M/UL (ref 4.6–6.2)
SODIUM SERPL-SCNC: 133 MMOL/L (ref 136–145)
WBC # BLD AUTO: 8.55 K/UL (ref 3.9–12.7)

## 2022-11-28 PROCEDURE — 36415 COLL VENOUS BLD VENIPUNCTURE: CPT | Performed by: INTERNAL MEDICINE

## 2022-11-28 PROCEDURE — 76937 US GUIDE VASCULAR ACCESS: CPT

## 2022-11-28 PROCEDURE — C1751 CATH, INF, PER/CENT/MIDLINE: HCPCS

## 2022-11-28 PROCEDURE — 63600175 PHARM REV CODE 636 W HCPCS: Performed by: PODIATRIST

## 2022-11-28 PROCEDURE — 99239 HOSP IP/OBS DSCHRG MGMT >30: CPT | Mod: GT,,, | Performed by: INTERNAL MEDICINE

## 2022-11-28 PROCEDURE — 13160 SEC CLSR SURG WND/DEHSN XTN: CPT | Mod: 58,,, | Performed by: PODIATRIST

## 2022-11-28 PROCEDURE — 94761 N-INVAS EAR/PLS OXIMETRY MLT: CPT

## 2022-11-28 PROCEDURE — 86140 C-REACTIVE PROTEIN: CPT | Performed by: INTERNAL MEDICINE

## 2022-11-28 PROCEDURE — 63600175 PHARM REV CODE 636 W HCPCS: Performed by: PHYSICIAN ASSISTANT

## 2022-11-28 PROCEDURE — 99239 PR HOSPITAL DISCHARGE DAY,>30 MIN: ICD-10-PCS | Mod: GT,,, | Performed by: INTERNAL MEDICINE

## 2022-11-28 PROCEDURE — 25000003 PHARM REV CODE 250: Performed by: PODIATRIST

## 2022-11-28 PROCEDURE — 99233 PR SUBSEQUENT HOSPITAL CARE,LEVL III: ICD-10-PCS | Mod: ,,, | Performed by: STUDENT IN AN ORGANIZED HEALTH CARE EDUCATION/TRAINING PROGRAM

## 2022-11-28 PROCEDURE — 13160 PR SECD CLOS SURG WND EXTEN/COMPLIC: ICD-10-PCS | Mod: 58,,, | Performed by: PODIATRIST

## 2022-11-28 PROCEDURE — 80053 COMPREHEN METABOLIC PANEL: CPT | Performed by: INTERNAL MEDICINE

## 2022-11-28 PROCEDURE — 97161 PT EVAL LOW COMPLEX 20 MIN: CPT

## 2022-11-28 PROCEDURE — 99233 SBSQ HOSP IP/OBS HIGH 50: CPT | Mod: ,,, | Performed by: STUDENT IN AN ORGANIZED HEALTH CARE EDUCATION/TRAINING PROGRAM

## 2022-11-28 PROCEDURE — 25000003 PHARM REV CODE 250: Performed by: PHYSICIAN ASSISTANT

## 2022-11-28 PROCEDURE — 85025 COMPLETE CBC W/AUTO DIFF WBC: CPT | Performed by: INTERNAL MEDICINE

## 2022-11-28 PROCEDURE — 36573 INSJ PICC RS&I 5 YR+: CPT

## 2022-11-28 RX ORDER — SODIUM CHLORIDE 0.9 % (FLUSH) 0.9 %
10 SYRINGE (ML) INJECTION EVERY 6 HOURS
Status: DISCONTINUED | OUTPATIENT
Start: 2022-11-28 | End: 2022-11-28 | Stop reason: HOSPADM

## 2022-11-28 RX ORDER — PEN NEEDLE, DIABETIC 30 GX3/16"
1 NEEDLE, DISPOSABLE MISCELLANEOUS 4 TIMES DAILY
Start: 2022-11-28 | End: 2024-04-03 | Stop reason: SDUPTHER

## 2022-11-28 RX ORDER — INSULIN ASPART 100 [IU]/ML
15 INJECTION, SOLUTION INTRAVENOUS; SUBCUTANEOUS
Status: DISCONTINUED | OUTPATIENT
Start: 2022-11-28 | End: 2022-11-28 | Stop reason: HOSPADM

## 2022-11-28 RX ORDER — INSULIN LISPRO 100 [IU]/ML
15 INJECTION, SOLUTION INTRAVENOUS; SUBCUTANEOUS
Start: 2022-11-28 | End: 2023-11-28

## 2022-11-28 RX ORDER — SODIUM CHLORIDE 0.9 % (FLUSH) 0.9 %
10 SYRINGE (ML) INJECTION
Status: DISCONTINUED | OUTPATIENT
Start: 2022-11-28 | End: 2022-11-28 | Stop reason: HOSPADM

## 2022-11-28 RX ORDER — CARVEDILOL 12.5 MG/1
6.25 TABLET ORAL 2 TIMES DAILY WITH MEALS
Qty: 180 TABLET | Refills: 3
Start: 2022-11-28 | End: 2023-04-06 | Stop reason: SDUPTHER

## 2022-11-28 RX ADMIN — OXYCODONE HYDROCHLORIDE AND ACETAMINOPHEN 500 MG: 500 TABLET ORAL at 09:11

## 2022-11-28 RX ADMIN — INSULIN ASPART 12 UNITS: 100 INJECTION, SOLUTION INTRAVENOUS; SUBCUTANEOUS at 09:11

## 2022-11-28 RX ADMIN — CARVEDILOL 6.25 MG: 6.25 TABLET, FILM COATED ORAL at 09:11

## 2022-11-28 RX ADMIN — ASPIRIN 81 MG: 81 TABLET, COATED ORAL at 09:11

## 2022-11-28 RX ADMIN — INSULIN ASPART 4 UNITS: 100 INJECTION, SOLUTION INTRAVENOUS; SUBCUTANEOUS at 09:11

## 2022-11-28 RX ADMIN — HEPARIN SODIUM 5000 UNITS: 5000 INJECTION INTRAVENOUS; SUBCUTANEOUS at 06:11

## 2022-11-28 RX ADMIN — CEFTRIAXONE 2 G: 2 INJECTION, POWDER, FOR SOLUTION INTRAMUSCULAR; INTRAVENOUS at 01:11

## 2022-11-28 RX ADMIN — INSULIN ASPART 15 UNITS: 100 INJECTION, SOLUTION INTRAVENOUS; SUBCUTANEOUS at 01:11

## 2022-11-28 RX ADMIN — INSULIN ASPART 4 UNITS: 100 INJECTION, SOLUTION INTRAVENOUS; SUBCUTANEOUS at 01:11

## 2022-11-28 RX ADMIN — HEPARIN SODIUM 5000 UNITS: 5000 INJECTION INTRAVENOUS; SUBCUTANEOUS at 01:11

## 2022-11-28 RX ADMIN — LISINOPRIL 40 MG: 20 TABLET ORAL at 09:11

## 2022-11-28 NOTE — CONSULTS
Double lumen PICC placed in Right basilic vein for IV rocephin for 6 weeks.    40 cm in length and 0 cm exposed.  Arm circumference 32 cm    Lot #[id number].

## 2022-11-28 NOTE — DISCHARGE SUMMARY
[facility]  Telemedicine Discharge Summary      Patient Name: Billy Rivera  MRN: [id number]  Patient Class: IP- Inpatient  Admission Date: 11/22/2022  Hospital Length of Stay: 6 days  Discharge Date and Time:  11/28/2022 3:49 PM  Attending Physician: Gisella Cheng MD   Discharging Provider: Gisella Cheng MD  Primary Care Provider: BRAD Baker MD      HPI:   Billy Rivera is a 57 y.o. male with a history of type 2 diabetes, CAD, HTN, and colon cancer admitted to [facility] with diabetic foot infection to his right 3rd toe. He was referred from his podiatrist, Dr. Melara, due to a right 3rd toe ulcer with infection that will likely require amputation. Endorses that toe is malodorous and has some purulent drainage. Endorses associated peripheral neuropathy. Denies associated toe pain, ankle or leg pain. He has had two previous amputations (left great toe and right 5th toe) due to similar conditions. Per his podiatrist, he was taking Doxy and Cipro since 11/15/2022 for the current right 3rd toe infection and per patient he has been started on IV abx upon arrival to hospital. He reports that his toe infections began around 3 years ago after stepping on piece of glass barefoot. Denies associated headache, fever, chills, CP, SOB, N/V/D, abdominal pain, urinary symptoms, numbness or tingling.       ED: VSS, AF. WBC 10, lactic acid elevated to 2.3. ESR & CRP mildly elevated, other labs unremarkable. Blood cultures pending. Right foot XR showing DJD without acute fracture or bone destruction. Given 1 L IVF and started on IV Vanc and Cefepime.      Procedure(s) (LRB):  AMPUTATION, TOE (Right)      Hospital Course:   Status post R 3rd toe amputation on 11/25.     See Problems listed below for additional details of this hospital stay.      Goals of Care Treatment Preferences:  Code Status: Full Code      Consults:   Consults (From admission, onward)          Status Ordering  Provider     [facility]  Once        Provider:  (Not yet assigned)    Completed LOLA CORBIN     Inpatient consult to Infectious Diseases  Once        Provider:  (Not yet assigned)    Completed LOLA CORBIN     Inpatient virtual consult to Hospital Medicine  Once        Provider:  (Not yet assigned)    Completed ERIC MURRAY     Inpatient consult to Endocrinology  Once        Provider:  (Not yet assigned)    Completed DEBBY PÉREZ     Inpatient consult to Podiatry  Once        Provider:  (Not yet assigned)    Completed GIOVANY BELCHER            * Diabetic foot infection  Right foot pain  Pt with 2 previous toe amputations for similar infections - left great toe and right 5th toe - most recent 06/2022. Previous cultures 06/2022 significant for Strept agalactiae (GBS) and Prevotella bivia. Cultures by Podiatry clinic 11/15/2022 significant for Staph aureus and Pasturella multocida. ESR & CRP elevated. WBC without leukocytosis  - current blood cultures NGTD  - Podiatry consulted, S/p amputation of 3rd R toe on 11/25.  - Was on ceftriaxone from pre-ampuation bone bx  - keep foot elevated and dry  - ID consulted - plan for 6 weeks ceftriaxone post op - end date 1/7/2022.    Ulcer of toe, right, with necrosis of bone  -s/p right 3rd toe amputation with Podiatry  - Intraop cultures pending  -RLE WB to heel in surgical shoe for transfers or short distances only. LLE WBAT.  -Dressing changes daily by nursing ordered per Podiatry; plan for HH.    Right foot pain  See diabetic foot infection    CAD (coronary artery disease)  - continue HTN management   - EKG prn for chest pain    Diabetic foot ulcer associated with type 2 diabetes mellitus  Patient's FSGs are uncontrolled.  Maintain anti-hyperglycemic dose as follows-   Endocrinology consulted and managing:  Antihyperglycemics (From admission, onward)      Start     Stop Route Frequency Ordered    11/28/22 1130  insulin aspart  U-100 pen 15 Units         -- SubQ 3 times daily with meals 11/28/22 0853    11/28/22 0900  insulin detemir U-100 pen 20 Units         -- SubQ 2 times daily 11/28/22 0853    11/27/22 0907  insulin aspart U-100 pen 1-10 Units         -- SubQ Before meals & nightly PRN 11/27/22 0807            Type 2 diabetes mellitus with diabetic peripheral angiopathy without gangrene, with long-term current use of insulin  Endocrinology consulted and managing  Resume home regimen at discharge    Hypertension associated with diabetes  - Continue Coreg 6.25 mg BID  - continue lisinopril 40 mg daily      Final Active Diagnoses:    Diagnosis Date Noted POA    PRINCIPAL PROBLEM:  Diabetic foot infection [E11.628, L08.9] 09/01/2020 Yes    Ulcer of toe, right, with necrosis of bone [L97.514] 11/25/2022 Yes    Right foot pain [M79.671] 11/22/2022 Yes    CAD (coronary artery disease) [I25.10] 06/08/2022 Yes     Chronic    Diabetic foot ulcer associated with type 2 diabetes mellitus [E11.621, L97.509] 06/25/2018 Yes    Type 2 diabetes mellitus with diabetic peripheral angiopathy without gangrene, with long-term current use of insulin [E11.51, Z79.4] 04/02/2017 Not Applicable     Chronic    Hypertension associated with diabetes [E11.59, I15.2] 03/30/2017 Yes     Chronic      Problems Resolved During this Admission:    Diagnosis Date Noted Date Resolved POA    Sleep apnea [G47.30] 06/15/2015 11/22/2022 Yes     Chronic       Discharged Condition: stable    Disposition: IV Therapy Provider    Follow Up:   Follow-up Information       [facility] Follow up today.    Contact information:  [address] [facility] [address]  [phone]               BRAD Baker MD. Schedule an appointment as soon as possible for a visit in 1 week(s).    Specialty: Internal Medicine  Why: For discharge from hospital follow up  Contact information:  [address] [facility] [place]  [address]  [phone]               [facility] INFECTIOUS DISEASE. Schedule an appointment as soon as possible for a visit.    Specialty: Infectious Diseases  Why: As previously planned, For discharge from hospital follow up  Contact information:  [address]  [facility] [address]  [phone]             Mingo Melara DPM. Schedule an appointment as soon as possible for a visit in 1 week(s).    Specialty: Podiatry  Why: As previously planned, Wound check  Contact information:  [address]  [facility] [address]  [phone]               [facility] Follow up in 1 day(s).    Specialties: Home Health Services, Home Therapy Services, Home Living Aide Services  Contact information:  [address]  [facility] [address]  [phone]                         Future Appointments   Date Time Provider Department Center   12/1/2022  2:30 PM Mingo Melara DPM [facility] POD [place]   12/7/2022  3:30 PM Yvonne Lopez MD [facility] IM [place]   12/21/2022  8:30 AM DRE Baker MD [facility] [place]   12/21/2022 10:30 AM Billy Dias PA-C [facility] ID [place]   1/17/2023  3:30 PM Akua Powell NP [facility] [place]       Patient Instructions:      Ambulatory referral/consult to Podiatry   Standing Status: Future   Referral Priority: Urgent Referral Type: Consultation   Referral Reason: Specialty Services Required   Requested Specialty: Podiatry   Number of Visits Requested: 1     Diet diabetic     Change dressing (specify)   Order Comments: Dressing change: daily dressing changes to right foot as follows: Pack 3rd right toe amputation site with aqucel ag and plantar 2nd met head wound with aquacel ag. Dress right foot with kerlix, and ace wraps loosely.     Notify your health care provider if you experience any of the following:  temperature >100.4     Notify your health care provider if you experience any of the following:  persistent nausea and vomiting or diarrhea      Notify your health care provider if you experience any of the following:  redness, tenderness, or signs of infection (pain, swelling, redness, odor or green/yellow discharge around incision site)     Notify your health care provider if you experience any of the following:  difficulty breathing or increased cough     Notify your health care provider if you experience any of the following:  severe persistent headache     Notify your health care provider if you experience any of the following:  persistent dizziness, light-headedness, or visual disturbances     Notify your health care provider if you experience any of the following:  increased confusion or weakness     Weight bearing restrictions (specify):   Order Comments: RLE WB to heel in surgical shoe for transfers or short distances only. LLE WBAT. Patient to keep dressing clean dry and intact.     Activity as tolerated       Significant Diagnostic Studies:   Recent Labs   Lab 06/08/22  0036 08/09/22  1634 11/10/22  0835   HGBA1C 10.0* 8.0* 7.1*     Recent Labs   Lab 11/22/22  1154 11/26/22  0734 11/28/22  0737   WBC 10.62 10.13 8.55   HGB 14.7 13.6* 14.3   HCT 43.2 40.3 41.5    247 257     Recent Labs   Lab 11/22/22  1154 11/26/22  0734 11/28/22  0737   GRAN 67.8  7.2 71.3  7.2 64.7  5.5   LYMPH 17.9*  1.9 15.4*  1.6 19.8  1.7   MONO 10.5  1.1* 8.6  0.9 10.2  0.9   EOS 0.3 0.3 0.3     Recent Labs   Lab 11/23/22  0253 11/24/22  0320 11/25/22  0336 11/26/22  0734 11/28/22  0737   * 131* 130* 134* 133*   K 4.4 4.1 4.4 4.5 4.8    100 100 102 102   CO2 23 24 22* 22* 23   BUN 19 22* 21* 17 16   CREATININE 0.9 1.0 0.9 0.8 0.9   * 248* 331* 205* 230*   CALCIUM 9.4 8.4* 8.6* 9.0 9.1   ALBUMIN 3.0* 2.9* 3.0* 3.2* 3.1*   MG 1.7 1.9 1.8  --   --    PHOS 3.5 3.3 2.7  --   --      Recent Labs   Lab 11/22/22  1154 11/23/22  0253 11/25/22  0336 11/26/22  0734 11/28/22  0737   ALKPHOS 97   < > 86 94 97   ALT 26   < > 25 71* 54*   AST 21   <  > 16 54* 23   PROT 7.7   < > 6.5 7.0 7.1   BILITOT 0.6   < > 0.3 0.5 0.7   CRP 15.6*  --   --   --  11.8*    < > = values in this interval not displayed.     Recent Labs   Lab 06/08/22  0036 11/22/22  1154 11/22/22 2001   LACTATE  --  2.3* 0.8   SEDRATE 78* 56*  --      SARS-CoV-2 RNA, Amplification, Qual (no units)   Date Value   11/22/2022 Negative   09/01/2020 Positive (A)   07/03/2020 Negative     POC Rapid COVID (no units)   Date Value   06/08/2022 Negative   12/21/2020 Negative   10/12/2020 Negative     X-Ray Chest 1 View S/P PICC Line by Nursing  Narrative: EXAMINATION:  XR CHEST 1 VIEW S/P PICC LINE BY NURSING    CLINICAL HISTORY:  s/p picc line placement;    TECHNIQUE:  One view    COMPARISON:  Comparison is made to 12/23/2020, the most recent prior chest radiograph.    FINDINGS:  Vascular catheter enters from the right arm, its tip in the superior vena cava.  Heart size is normal, as is the appearance of the pulmonary vascularity.  Lung zones are clear, and are free of significant airspace consolidation or volume loss.  No pleural fluid of any substantial volume is seen on either side.  No hilar or mediastinal mass lesion.  No pneumothorax.  Impression: No significant intrathoracic abnormality, and no detrimental interval change in the appearance of the chest since 12/23/2020 is appreciated.  Vascular placement as above.    Electronically signed by: Wilbert Velez MD  Date:    11/28/2022  Time:    13:56      Pending Diagnostic Studies:       Procedure Component Value Units Date/Time    Specimen to Pathology, Surgery Orthopedics [[id number]] Collected: 11/25/22 2026    Order Status: Sent Lab Status: In process Updated: 11/28/22 1036    Specimen: Tissue     Specimen to Pathology, Surgery Orthopedics [[id number]] Collected: 11/25/22 2034    Order Status: Sent Lab Status: In process Updated: 11/28/22 1029    Specimen: Tissue            Medications:  Reconciled Home Medications:      Medication List        START  taking these medications      cefTRIAXone 2 g/50 mL Pgbk IVPB  Commonly known as: ROCEPHIN  Inject 50 mLs (2 g total) into the vein Daily.            CHANGE how you take these medications      aspirin 81 MG EC tablet  Commonly known as: ECOTRIN  Take 1 tablet (81 mg total) by mouth once daily.  What changed: Another medication with the same name was removed. Continue taking this medication, and follow the directions you see here.     carvediloL 12.5 MG tablet  Commonly known as: COREG  Take 0.5 tablets (6.25 mg total) by mouth 2 (two) times daily with meals.  What changed:   how much to take  when to take this     TRULICITY 1.5 mg/0.5 mL pen injector  Generic drug: dulaglutide  Inject 1.5 mg into the skin every 7 days.  What changed: when to take this            CONTINUE taking these medications      ACCU-CHEK GUIDE TEST STRIPS Strp  Generic drug: blood sugar diagnostic  1 each by Misc.(Non-Drug; Combo Route) route 5 (five) times daily.     acetaminophen 500 MG tablet  Commonly known as: TYLENOL  Take 1,000 mg by mouth daily as needed for Pain.     ascorbic acid (vitamin C) 250 MG tablet  Commonly known as: VITAMIN C  Take 500 mg by mouth once daily.     atorvastatin 80 MG tablet  Commonly known as: LIPITOR  Take 1 tablet (80 mg total) by mouth once daily. Take every night.     bismuth subsalicylate 262 mg/15 mL suspension  Commonly known as: PEPTO BISMOL  Take 15 mLs by mouth daily as needed (diarrhea).     FARXIGA 5 mg Tab tablet  Generic drug: dapagliflozin  Take 1 tablet (5 mg total) by mouth once daily.     insulin lispro 100 unit/mL injection  Commonly known as: HumaLOG U-100 Insulin  Inject 15 Units into the skin 3 (three) times daily before meals. Gives via insulin pump only. Do not Directly inject.     lancets Misc  Commonly known as: ACCU-CHEK FASTCLIX LANCET DRUM  1 each by Misc.(Non-Drug; Combo Route) route Daily.     LANTUS SOLOSTAR U-100 INSULIN glargine 100 units/mL SubQ pen  Generic drug:  "insulin  Inject 50 Units into the skin once daily. USE AS BACK UP INSULIN ONLY - EMERGENCY USE IF YOUR ARE OFF OF YOUR INSULIN PUMP     lisinopriL 40 MG tablet  Commonly known as: PRINIVIL,ZESTRIL  Take 1 tablet by mouth once daily     MEN'S MULTIVITAMIN ORAL  Take 1 tablet by mouth once daily.     metFORMIN 1000 MG tablet  Commonly known as: GLUCOPHAGE  Take 1 tablet (1,000 mg total) by mouth 2 (two) times daily with meals.     MINIMED 770G INSULIN PUMP Misc  Generic drug: subcutaneous insulin pump  1 each by Misc.(Non-Drug; Combo Route) route continuous. Medically necessary for management of Type 2 diabetes, E11.65     pen needle, diabetic 31 gauge x 3/16" Ndle  Inject 1 each into the skin 4 (four) times daily.            STOP taking these medications      ciprofloxacin HCl 500 MG tablet  Commonly known as: CIPRO     ibuprofen 200 MG tablet  Commonly known as: ADVIL,MOTRIN              Indwelling Lines/Drains at time of discharge:   Lines/Drains/Airways       Peripherally Inserted Central Catheter Line  Duration             PICC Double Lumen 11/28/22 1135 right basilic <1 day          Time spent on the discharge of patient: 50 minutes  This service was provided by Virtual Visit.   Patient was seen and examined on the date of discharge.  Additional time was spent speaking with consultants and case management, reviewing records, and/or discussing the plan of care with patient/family.  The patient location is: [address] [place] [address] A  Admitted 11/22/2022 11:08 AM  Present with the patient at the time of the telemed/virtual assessment: Telepresenter    Patient was transferred to [facility] on:  11/27/2022  This document was prepared by chart review and may not directly reflect my personal knowledge of the patient's case, clinical course, or significant events during the hospital stay.    The attending portion of this evaluation, treatment, and documentation was performed per Gisella Cheng MD via Telemedicine " AudioVisual using the secure joblocal software platform with 2 way audio/video. The provider was located off-site and the patient is located in the hospital. The aforementioned video software was utilized to document the relevant history and physical exam    Gisella Cheng MD  Department of [facility] Medicine  [facility] Surg

## 2022-11-28 NOTE — PLAN OF CARE
[facility] will follow with teaching.

## 2022-11-28 NOTE — PLAN OF CARE
[facility]      HOME HEALTH ORDERS  FACE TO FACE ENCOUNTER    Patient Name: Billy Rivera  YOB: 1965    PCP: BRAD Baker MD   PCP Address: [address] / [place] [address]  PCP Phone Number: [phone]  PCP Fax: [phone]    Encounter Date: 11/22/22    Admit to Home Health    Diagnoses:  Active Hospital Problems    Diagnosis  POA    *Diabetic foot infection [E11.628, L08.9]  Yes    Ulcer of toe, right, with necrosis of bone [L97.514]  Yes    Right foot pain [M79.671]  Yes    CAD (coronary artery disease) [I25.10]  Yes     Chronic    Diabetic foot ulcer associated with type 2 diabetes mellitus [E11.621, L97.509]  Yes    Type 2 diabetes mellitus with diabetic peripheral angiopathy without gangrene, with long-term current use of insulin [E11.51, Z79.4]  Not Applicable     Chronic    Hypertension associated with diabetes [E11.59, I15.2]  Yes     Chronic      Resolved Hospital Problems    Diagnosis Date Resolved POA    Sleep apnea [G47.30] 11/22/2022 Yes     Chronic       Follow Up Appointments:  Future Appointments   Date Time Provider Department Center   12/21/2022  8:30 AM DRE Baker MD [facility] [place]   1/17/2023  3:30 PM Akua Powell NP [facility] [place]       Allergies:  Review of patient's allergies indicates:   Allergen Reactions    Penicillins Other (See Comments)     PCN allergy as a child - was told he went into a coma. Tolerates Cefazolin without adverse reactions    Shellfish containing products      Other reaction(s): Unknown    Vancomycin Itching     Tolerated vancomycin 7/2020    Bactrim  [sulfamethoxazole-trimethoprim] Rash       Medications: Review discharge medications with patient and family and provide education.    Current Facility-Administered Medications   Medication Dose Route Frequency Provider Last Rate Last Admin    acetaminophen tablet 650 mg  650 mg Oral Q4H PRN Chris Groves DPM        albuterol-ipratropium 2.5 mg-0.5  mg/3 mL nebulizer solution 3 mL  3 mL Nebulization Q4H PRN Chris Groves, DAE        ascorbic acid (vitamin C) tablet 500 mg  500 mg Oral Daily JAYNE CallM   500 mg at 11/28/22 0922    aspirin EC tablet 81 mg  81 mg Oral Daily JAYNE CallM   81 mg at 11/28/22 0922    atorvastatin tablet 80 mg  80 mg Oral QHS JAYNE CallM   80 mg at 11/27/22 2104    bisacodyL suppository 10 mg  10 mg Rectal Daily PRN Chris Groves DPM        carvediloL tablet 6.25 mg  6.25 mg Oral BID Chris Groves DPM   6.25 mg at 11/28/22 0922    cefTRIAXone (ROCEPHIN) 2 g/50 mL D5W IVPB  2 g Intravenous Q24H Cielo Almazan PA-C   Stopped at 11/27/22 1515    dextrose 10% bolus 125 mL  12.5 g Intravenous PRN Chris Groves DPM        dextrose 10% bolus 250 mL  25 g Intravenous PRN Chris Groves DPM        glucagon (human recombinant) injection 1 mg  1 mg Intramuscular PRN Chris Groves DPM        glucose chewable tablet 16 g  16 g Oral PRN Chris Groves DPM        glucose chewable tablet 24 g  24 g Oral PRN Chris Groves DPM        haloperidol lactate injection 0.5 mg  0.5 mg Intravenous Q10 Min PRN Melody Suazo MD        heparin (porcine) injection 5,000 Units  5,000 Units Subcutaneous Q8H JAYNE CallM   5,000 Units at 11/28/22 0619    HYDROmorphone injection 0.2 mg  0.2 mg Intravenous Q10 Min PRN Melody Suazo MD        insulin aspart U-100 pen 1-10 Units  1-10 Units Subcutaneous QID (AC + HS) PRN Kenneth Hughes DNP, FNP   4 Units at 11/28/22 0924    insulin aspart U-100 pen 15 Units  15 Units Subcutaneous TIDWM Kenneth Hughes DNP, CHETNA        insulin detemir U-100 pen 20 Units  20 Units Subcutaneous BID Kenneth C. Majeste, DNP, FNP   20 Units at 11/28/22 0923    lisinopriL tablet 40 mg  40 mg Oral Daily Chris Groves DPM   40 mg at 11/28/22 0922    melatonin tablet 6 mg  6 mg Oral Nightly PRN Chris Groves DPM        naloxone 0.4 mg/mL injection 0.02 mg  0.02 mg Intravenous PRN  "Chris Groves, DPRALPH        ondansetron disintegrating tablet 8 mg  8 mg Oral Q8H PRN Chris Groves, JAYNEM        oxyCODONE immediate release tablet 5 mg  5 mg Oral Q6H PRN Chris Groves, DPM        oxyCODONE immediate release tablet Tab 10 mg  10 mg Oral Q6H PRN Chris Groves, DPM        polyethylene glycol packet 17 g  17 g Oral Daily PRN Chris Groves, DAE        promethazine tablet 25 mg  25 mg Oral Q6H PRN Chris Groves, JAYNEM        sodium chloride 0.9% flush 10 mL  10 mL Intravenous Q12H PRN Chris Groves, JAYNEM        sodium chloride 0.9% flush 10 mL  10 mL Intravenous PRN Chris Groves, JAYNEM        sodium chloride 0.9% flush 10 mL  10 mL Intravenous Q6H Gisella Cheng MD        And    sodium chloride 0.9% flush 10 mL  10 mL Intravenous PRN Gisella Chegn MD         Current Discharge Medication List        START taking these medications    Details   cefTRIAXone (ROCEPHIN) 2 g/50 mL PgBk IVPB Inject 50 mLs (2 g total) into the vein Daily.  Refills: 0           CONTINUE these medications which have CHANGED    Details   carvediloL (COREG) 12.5 MG tablet Take 0.5 tablets (6.25 mg total) by mouth 2 (two) times daily with meals.  Qty: 180 tablet, Refills: 3    Comments: .      insulin lispro (HUMALOG U-100 INSULIN) 100 unit/mL injection Inject 15 Units into the skin 3 (three) times daily before meals. Gives via insulin pump only. Do not Directly inject.    Comments: Has [employer] voucher to make it $35/month  Associated Diagnoses: Type 2 diabetes mellitus 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, with long-term current use of insulin      pen needle, diabetic 31 gauge x 3/16" Ndle Inject 1 each into the skin 4 (four) times daily.    Associated Diagnoses: Type 2 diabetes mellitus without complication, with long-term current use of insulin           CONTINUE these medications which have NOT CHANGED    Details   acetaminophen (TYLENOL) 500 MG tablet Take 1,000 mg by " mouth daily as needed for Pain.      ascorbic acid, vitamin C, (VITAMIN C) 250 MG tablet Take 500 mg by mouth once daily.      aspirin (ECOTRIN) 81 MG EC tablet Take 1 tablet (81 mg total) by mouth once daily.  Refills: 0      atorvastatin (LIPITOR) 80 MG tablet Take 1 tablet (80 mg total) by mouth once daily. Take every night.  Qty: 90 tablet, Refills: 3      bismuth subsalicylate (PEPTO BISMOL) 262 mg/15 mL suspension Take 15 mLs by mouth daily as needed (diarrhea).      dapagliflozin (FARXIGA) 5 mg Tab tablet Take 1 tablet (5 mg total) by mouth once daily.  Qty: 30 tablet, Refills: 4    Associated Diagnoses: Type 2 diabetes mellitus 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, with long-term current use of insulin      lisinopriL (PRINIVIL,ZESTRIL) 40 MG tablet Take 1 tablet by mouth once daily  Qty: 30 tablet, Refills: 0      metFORMIN (GLUCOPHAGE) 1000 MG tablet Take 1 tablet (1,000 mg total) by mouth 2 (two) times daily with meals.  Qty: 180 tablet, Refills: 3    Associated Diagnoses: Type 2 diabetes mellitus with hyperglycemia, with long-term current use of insulin      multivit-min/folic/vit K/lycop (MEN'S MULTIVITAMIN ORAL) Take 1 tablet by mouth once daily.      TRULICITY 1.5 mg/0.5 mL pen injector Inject 1.5 mg into the skin every 7 days.  Qty: 4 pen, Refills: 11    Associated Diagnoses: Type 2 diabetes mellitus with diabetic peripheral angiopathy without gangrene, with long-term current use of insulin      blood sugar diagnostic (ACCU-CHEK GUIDE TEST STRIPS) Strp 1 each by Misc.(Non-Drug; Combo Route) route 5 (five) times daily.  Qty: 150 each, Refills: 11    Comments: To use with Medtronic insulin pump  Associated Diagnoses: Uncontrolled type 2 diabetes mellitus with hyperglycemia      insulin (LANTUS SOLOSTAR U-100 INSULIN) glargine 100 units/mL SubQ pen Inject 50 Units into the skin once daily. USE AS BACK UP INSULIN ONLY - EMERGENCY USE IF YOUR ARE OFF OF  YOUR INSULIN PUMP  Qty: 15 mL, Refills: 1    Associated Diagnoses: Type 2 diabetes mellitus 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, with long-term current use of insulin      lancets (ACCU-CHEK FASTCLIX LANCET DRUM) Misc 1 each by Misc.(Non-Drug; Combo Route) route Daily.  Qty: 30 each, Refills: 11    Comments: 30 drums per month to use with Medtronic insulin pump  Associated Diagnoses: Uncontrolled type 2 diabetes mellitus with hyperglycemia      MINIMED 770G INSULIN PUMP Misc 1 each by Misc.(Non-Drug; Combo Route) route continuous. Medically necessary for management of Type 2 diabetes, E11.65  Qty: 1 each, Refills: 0    Associated Diagnoses: Type 2 diabetes mellitus 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, with long-term current use of insulin           STOP taking these medications       aspirin 325 MG tablet Comments:   Reason for Stopping:         ciprofloxacin HCl (CIPRO) 500 MG tablet Comments:   Reason for Stopping:         ibuprofen (ADVIL,MOTRIN) 200 MG tablet Comments:   Reason for Stopping:                 I have seen and examined this patient within the last 30 days. My clinical findings that support the need for the home health skilled services and home bound status are the following:no   Requiring assistive device to leave home due to unsteady gait caused by  Infection and Surgery.     Diet:   diabetic diet 2200 calorie     Labs:  Order the following labs to be drawn on Mondays:   CBC  CMP   CRP   Fax Lab Results to Infectious Diseases Provider:  Cielo Almazan PA-C.  [facility] Fax Number: [phone]     Referrals/ Consults  Physical Therapy to evaluate and treat. Evaluate for home safety and equipment needs; Establish/upgrade home exercise program. Perform / instruct on therapeutic exercises, gait training, transfer training, and Range of Motion.  Occupational Therapy to evaluate and treat.  Evaluate home environment for safety and equipment needs. Perform/Instruct on transfers, ADL training, ROM, and therapeutic exercises.   to evaluate for community resources/long-range planning.    Activities:   activity as tolerated.  RLE WB to heel in surgical shoe for transfers or short distances only. LLE WBAT. Patient to keep dressing clean dry and intact.     Nursing:   Agency to admit patient within 24 hours of hospital discharge unless specified on physician order or at patient request    SN to complete comprehensive assessment including routine vital signs. Instruct on disease process and s/s of complications to report to MD. Review/verify medication list sent home with the patient at time of discharge  and instruct patient/caregiver as needed. Frequency may be adjusted depending on start of care date.     Skilled nurse to perform up to 3 visits PRN for symptoms related to diagnosis    Notify MD if SBP > 160 or < 90; DBP > 90 or < 50; HR > 120 or < 50; Temp > 101; O2 < 88%    Ok to schedule additional visits based on staff availability and patient request on consecutive days within the home health episode.    When multiple disciplines ordered:    Start of Care occurs on Sunday - Wednesday schedule remaining discipline evaluations as ordered on separate consecutive days following the start of care.    Thursday SOC -schedule subsequent evaluations Friday and Monday the following week.     Friday - Saturday SOC - schedule subsequent discipline evaluations on consecutive days starting Monday of the following week.    For all post-discharge communication and subsequent orders please contact patient's primary care physician. If unable to reach primary care physician or do not receive response within 30 minutes, please contact [facility] RN for clinical staff order clarification    Miscellaneous   Home Infusion Therapy:   SN to perform Infusion Therapy/Central Line Care.  Review Central Line Care  & Central Line Flush with patient.    Administer (drug and dose): Ceftriaxone 2 g IV q 24 hours  x 6 weeks - estimated end date 1/7/22    Scrub the Hub: Prior to accessing the line, always perform a 30 second alcohol scrub  Each lumen of the central line is to be flushed at least daily with 10 mL Normal Saline and 3 mL Heparin flush (10 units/mL)  Skilled Nurse (SN) may draw blood from IV access  Blood Draw Procedure:   - Aspirate at least 5 mL of blood   - Discard   - Obtain specimen   - Change injection cap   - Flush with 20 mL Normal Saline followed by a                 3-5 mL Heparin flush (10 units/mL)  Central :   - Sterile dressing changes are done weekly and as needed.   - Use chlor-hexadine scrub to cleanse site, apply Biopatch to insertion site,       apply securement device dressing   - Injection caps are changed weekly and after EVERY lab draw.   - If sterile gauze is under dressing to control oozing,                 dressing change must be performed every 24 hours until gauze is not needed.    Home Health Aide:  Nursing Daily, Physical Therapy Twice weekly, and Occupational Therapy Twice weekly    Wound Care Orders  Home health for daily dressing changes to right foot as follows: Pack 3rd right toe amputation site with aqucel ag and plantar 2nd met head wound with aquacel ag. Dress right foot with kerlix, and ace wraps loosely.     I certify that this patient is confined to his home and needs intermittent skilled nursing care, physical therapy, and occupational therapy.    Gisella Cheng MD

## 2022-11-28 NOTE — SUBJECTIVE & OBJECTIVE
Subjective:     Interval History: Bradycardic, other vitals stable. No new pedal complaints. Dressings clean dry, intact. Anticipate DC today.       Follow-up For: Procedure(s) (LRB):  AMPUTATION, TOE (Right)    Post-Operative Day: 3 Days Post-Op    Scheduled Meds:   ascorbic acid (vitamin C)  500 mg Oral Daily    aspirin  81 mg Oral Daily    atorvastatin  80 mg Oral QHS    carvediloL  6.25 mg Oral BID    cefTRIAXone (ROCEPHIN) IVPB  2 g Intravenous Q24H    heparin (porcine)  5,000 Units Subcutaneous Q8H    insulin aspart U-100  15 Units Subcutaneous TIDWM    insulin detemir U-100  20 Units Subcutaneous BID    lisinopriL  40 mg Oral Daily    sodium chloride 0.9%  10 mL Intravenous Q6H     Continuous Infusions:  PRN Meds:acetaminophen, albuterol-ipratropium, bisacodyL, dextrose 10%, dextrose 10%, glucagon (human recombinant), glucose, glucose, haloperidol lactate, HYDROmorphone, insulin aspart U-100, melatonin, naloxone, ondansetron, oxyCODONE, oxyCODONE, polyethylene glycol, promethazine, sodium chloride 0.9%, sodium chloride 0.9%, Flushing PICC Protocol **AND** sodium chloride 0.9% **AND** sodium chloride 0.9%    Review of Systems   Constitutional:  Negative for activity change, chills, diaphoresis and fever.   HENT:  Negative for trouble swallowing.    Eyes:  Negative for photophobia and visual disturbance.   Respiratory:  Negative for cough, chest tightness, shortness of breath and wheezing.    Cardiovascular:  Negative for chest pain, palpitations and leg swelling.   Gastrointestinal:  Negative for abdominal pain, constipation, diarrhea, nausea and vomiting.   Genitourinary:  Negative for dysuria, frequency, hematuria and urgency.   Musculoskeletal:  Negative for arthralgias, back pain and gait problem.   Skin:  Positive for wound (s/p amputation R 3rd toe). Negative for rash.   Neurological:  Negative for dizziness, syncope, weakness, light-headedness, numbness and headaches.   Psychiatric/Behavioral:   Negative for agitation and confusion. The patient is not nervous/anxious.    Objective:     Vital Signs (Most Recent):  Temp: 98.6 °F (37 °C) (11/28/22 1158)  Pulse: (!) 57 (11/28/22 1158)  Resp: 18 (11/28/22 1158)  BP: 112/65 (11/28/22 1158)  SpO2: 96 % (11/28/22 1158)   Vital Signs (24h Range):  Temp:  [97.4 °F (36.3 °C)-98.6 °F (37 °C)] 98.6 °F (37 °C)  Pulse:  [54-60] 57  Resp:  [17-18] 18  SpO2:  [93 %-96 %] 96 %  BP: (112-147)/(60-74) 112/65     Weight: 121.8 kg (268 lb 8.3 oz)  Body mass index is 34.48 kg/m².    Foot Exam    General  Orientation: alert and oriented to person, place, and time       Right Foot/Ankle     Inspection and Palpation  Tenderness: none   Swelling: (3rd toe)  Skin Exam: callus, drainage, dry skin, skin changes, abnormal color and ulcer; skin not intact, no cellulitis and no erythema     Neurovascular  Dorsalis pedis: 1+  Posterior tibial: doppler  Saphenous nerve sensation: absent  Tibial nerve sensation: absent  Superficial peroneal nerve sensation: absent  Deep peroneal nerve sensation: absent  Sural nerve sensation: absent    Comments  -Partial 5th ray amputated  -3rd toe s/p amputation. Surgical site was partially left open. Retention sutures are intact. Wound  measures 4cm x 2.5 cm x 3cm. Wound bed has some bleeding noticed. No purulent drainage noticed.   -2nd metatarsal head plantar wound down to bone, fibrotic wound bed, serosanguinous drainage. Not tender.       Left Foot/Ankle      Inspection and Palpation  Tenderness: none   Swelling: none   Skin Exam: callus and dry skin; no drainage, no cellulitis, no ulcer and no erythema     Neurovascular  Dorsalis pedis: 1+  Posterior tibial: doppler  Saphenous nerve sensation: absent  Tibial nerve sensation: absent  Superficial peroneal nerve sensation: absent  Deep peroneal nerve sensation: absent  Sural nerve sensation: absent    Comments  -Partial 1st ray amputated            Laboratory:  Blood Cultures: No results for input(s):  LABBLOO in the last 48 hours.  CBC:   Recent Labs   Lab 11/28/22  0737   WBC 8.55   RBC 4.77   HGB 14.3   HCT 41.5      MCV 87   MCH 30.0   MCHC 34.5     CMP:   Recent Labs   Lab 11/28/22  0737   *   CALCIUM 9.1   ALBUMIN 3.1*   PROT 7.1   *   K 4.8   CO2 23      BUN 16   CREATININE 0.9   ALKPHOS 97   ALT 54*   AST 23   BILITOT 0.7     CRP:   Recent Labs   Lab 11/28/22  0737   CRP 11.8*     ESR:   Recent Labs   Lab 11/22/22  1154   SEDRATE 56*     Microbiology Results (last 7 days)       Procedure Component Value Units Date/Time    AFB Culture & Smear [[id number]] Collected: 11/25/22 2024    Order Status: Completed Specimen: Wound from Foot, Right Updated: 11/28/22 1329     AFB Culture & Smear Culture in progress     AFB CULTURE STAIN No acid fast bacilli seen.    Narrative:      Bone, Right 3rd Toe    Fungus culture [[id number]] Collected: 11/25/22 2024    Order Status: Completed Specimen: Wound from Foot, Right Updated: 11/28/22 1022     Fungus (Mycology) Culture Culture in progress    Narrative:      Bone, Right 3rd Toe    Aerobic culture [[id number]]  (Abnormal) Collected: 11/25/22 2024    Order Status: Completed Specimen: Wound from Foot, Right Updated: 11/28/22 0855     Aerobic Bacterial Culture DIPHTHEROIDS  Few      Narrative:      Bone, Right 3rd Toe    Blood culture #1 **CANNOT BE ORDERED STAT** [[id number]] Collected: 11/22/22 1153    Order Status: Completed Specimen: Blood from Peripheral, Forearm, Right Updated: 11/27/22 1412     Blood Culture, Routine No growth after 5 days.    Blood culture #2 **CANNOT BE ORDERED STAT** [[id number]] Collected: 11/22/22 1153    Order Status: Completed Specimen: Blood from Peripheral, Forearm, Right Updated: 11/27/22 1412     Blood Culture, Routine No growth after 5 days.    Culture, Anaerobe [[id number]] Collected: 11/25/22 2024    Order Status: Completed Specimen: Wound from Foot, Right Updated: 11/27/22 1307     Anaerobic Culture Culture in  progress    Narrative:      Bone, Right 3rd Toe    Gram stain [[id number]] Collected: 11/25/22 2024    Order Status: Completed Specimen: Wound from Foot, Right Updated: 11/25/22 2355     Gram Stain Result Rare WBC's      Rare Gram positive cocci    Narrative:      Bone, Right 3rd Toe          Specimen (24h ago, onward)      None

## 2022-11-28 NOTE — PROGRESS NOTES
[facility] Surg  Podiatry  Progress Note    Patient Name: Billy Sheffield Miguel  MRN: [id number]  Admission Date: 11/22/2022  Hospital Length of Stay: 6 days  Attending Physician: Gisella Cheng MD  Primary Care Provider: BRAD Baker MD     Subjective:     Interval History: Bradycardic, other vitals stable. No new pedal complaints. Dressings clean dry, intact. Anticipate DC today.       Follow-up For: Procedure(s) (LRB):  AMPUTATION, TOE (Right)    Post-Operative Day: 3 Days Post-Op    Scheduled Meds:   ascorbic acid (vitamin C)  500 mg Oral Daily    aspirin  81 mg Oral Daily    atorvastatin  80 mg Oral QHS    carvediloL  6.25 mg Oral BID    cefTRIAXone (ROCEPHIN) IVPB  2 g Intravenous Q24H    heparin (porcine)  5,000 Units Subcutaneous Q8H    insulin aspart U-100  15 Units Subcutaneous TIDWM    insulin detemir U-100  20 Units Subcutaneous BID    lisinopriL  40 mg Oral Daily    sodium chloride 0.9%  10 mL Intravenous Q6H     Continuous Infusions:  PRN Meds:acetaminophen, albuterol-ipratropium, bisacodyL, dextrose 10%, dextrose 10%, glucagon (human recombinant), glucose, glucose, haloperidol lactate, HYDROmorphone, insulin aspart U-100, melatonin, naloxone, ondansetron, oxyCODONE, oxyCODONE, polyethylene glycol, promethazine, sodium chloride 0.9%, sodium chloride 0.9%, Flushing PICC Protocol **AND** sodium chloride 0.9% **AND** sodium chloride 0.9%    Review of Systems   Constitutional:  Negative for activity change, chills, diaphoresis and fever.   HENT:  Negative for trouble swallowing.    Eyes:  Negative for photophobia and visual disturbance.   Respiratory:  Negative for cough, chest tightness, shortness of breath and wheezing.    Cardiovascular:  Negative for chest pain, palpitations and leg swelling.   Gastrointestinal:  Negative for abdominal pain, constipation, diarrhea, nausea and vomiting.   Genitourinary:  Negative for dysuria, frequency, hematuria and urgency.   Musculoskeletal:  Negative  for arthralgias, back pain and gait problem.   Skin:  Positive for wound (s/p amputation R 3rd toe). Negative for rash.   Neurological:  Negative for dizziness, syncope, weakness, light-headedness, numbness and headaches.   Psychiatric/Behavioral:  Negative for agitation and confusion. The patient is not nervous/anxious.    Objective:     Vital Signs (Most Recent):  Temp: 98.6 °F (37 °C) (11/28/22 1158)  Pulse: (!) 57 (11/28/22 1158)  Resp: 18 (11/28/22 1158)  BP: 112/65 (11/28/22 1158)  SpO2: 96 % (11/28/22 1158)   Vital Signs (24h Range):  Temp:  [97.4 °F (36.3 °C)-98.6 °F (37 °C)] 98.6 °F (37 °C)  Pulse:  [54-60] 57  Resp:  [17-18] 18  SpO2:  [93 %-96 %] 96 %  BP: (112-147)/(60-74) 112/65     Weight: 121.8 kg (268 lb 8.3 oz)  Body mass index is 34.48 kg/m².    Foot Exam    General  Orientation: alert and oriented to person, place, and time       Right Foot/Ankle     Inspection and Palpation  Tenderness: none   Swelling: (3rd toe)  Skin Exam: callus, drainage, dry skin, skin changes, abnormal color and ulcer; skin not intact, no cellulitis and no erythema     Neurovascular  Dorsalis pedis: 1+  Posterior tibial: doppler  Saphenous nerve sensation: absent  Tibial nerve sensation: absent  Superficial peroneal nerve sensation: absent  Deep peroneal nerve sensation: absent  Sural nerve sensation: absent    Comments  -Partial 5th ray amputated  -3rd toe s/p amputation. Surgical site was partially left open. Retention sutures are intact. Wound  measures 4cm x 2.5 cm x 3cm. Wound bed has some bleeding noticed. No purulent drainage noticed.   -2nd metatarsal head plantar wound down to bone, fibrotic wound bed, serosanguinous drainage. Not tender.       Left Foot/Ankle      Inspection and Palpation  Tenderness: none   Swelling: none   Skin Exam: callus and dry skin; no drainage, no cellulitis, no ulcer and no erythema     Neurovascular  Dorsalis pedis: 1+  Posterior tibial: doppler  Saphenous nerve sensation: absent  Tibial  nerve sensation: absent  Superficial peroneal nerve sensation: absent  Deep peroneal nerve sensation: absent  Sural nerve sensation: absent    Comments  -Partial 1st ray amputated            Laboratory:  Blood Cultures: No results for input(s): LABBLOO in the last 48 hours.  CBC:   Recent Labs   Lab 11/28/22  0737   WBC 8.55   RBC 4.77   HGB 14.3   HCT 41.5      MCV 87   MCH 30.0   MCHC 34.5     CMP:   Recent Labs   Lab 11/28/22  0737   *   CALCIUM 9.1   ALBUMIN 3.1*   PROT 7.1   *   K 4.8   CO2 23      BUN 16   CREATININE 0.9   ALKPHOS 97   ALT 54*   AST 23   BILITOT 0.7     CRP:   Recent Labs   Lab 11/28/22  0737   CRP 11.8*     ESR:   Recent Labs   Lab 11/22/22  1154   SEDRATE 56*     Microbiology Results (last 7 days)       Procedure Component Value Units Date/Time    AFB Culture & Smear [[id number]] Collected: 11/25/22 2024    Order Status: Completed Specimen: Wound from Foot, Right Updated: 11/28/22 1329     AFB Culture & Smear Culture in progress     AFB CULTURE STAIN No acid fast bacilli seen.    Narrative:      Bone, Right 3rd Toe    Fungus culture [[id number]] Collected: 11/25/22 2024    Order Status: Completed Specimen: Wound from Foot, Right Updated: 11/28/22 1022     Fungus (Mycology) Culture Culture in progress    Narrative:      Bone, Right 3rd Toe    Aerobic culture [[id number]]  (Abnormal) Collected: 11/25/22 2024    Order Status: Completed Specimen: Wound from Foot, Right Updated: 11/28/22 0855     Aerobic Bacterial Culture DIPHTHEROIDS  Few      Narrative:      Bone, Right 3rd Toe    Blood culture #1 **CANNOT BE ORDERED STAT** [[id number]] Collected: 11/22/22 1153    Order Status: Completed Specimen: Blood from Peripheral, Forearm, Right Updated: 11/27/22 1412     Blood Culture, Routine No growth after 5 days.    Blood culture #2 **CANNOT BE ORDERED STAT** [[id number]] Collected: 11/22/22 1153    Order Status: Completed Specimen: Blood from Peripheral, Forearm, Right Updated:  11/27/22 1412     Blood Culture, Routine No growth after 5 days.    Culture, Anaerobe [[id number]] Collected: 11/25/22 2024    Order Status: Completed Specimen: Wound from Foot, Right Updated: 11/27/22 1307     Anaerobic Culture Culture in progress    Narrative:      Bone, Right 3rd Toe    Gram stain [[id number]] Collected: 11/25/22 2024    Order Status: Completed Specimen: Wound from Foot, Right Updated: 11/25/22 9758     Gram Stain Result Rare WBC's      Rare Gram positive cocci    Narrative:      Bone, Right 3rd Toe          Specimen (24h ago, onward)      None          Assessment/Plan:     * Diabetic foot infection  Assessment: IDSA moderate diabetic foot infection with right 3rd toe osteomyelitis and possible 2nd MTPJ osteomyelitis. X ray unremarkable. MRI + for right 3rd middle and proximal phalanx OM. Bone culture + for Staph aureus and Pasteurella. Pedal pulses diminished with R CODY 1.1 and L VASILIY 1.2. patient now s/p right 3rd toe amputation with bedside closure.     Plan:  -Bedside surgical site closure right foot performed with 3 mL 2% lidocaine plain, 2 vicryl sutures and 5 nylon sutures. No complications.   -Antibiotic plan per primary team.   -RLE WB to heel in surgical shoe for transfers or short distances. LLE WBAT.  -Dressing changes by nursing ordered.   -Podiatry will follow.   Discharge instructions: Patient to follow up with Dr. Melara in podiatry clinic within 1 week of discharge. Patient to RLE WB to heel in surgical shoe for transfers or short distances. LLE WBAT. Patient to keep dressing clean dry and intact. Home health or SNF for dressing changes to right foot as follows: Coveer 3rd right toe amputation site with aqucel ag and plantar 2nd met head wound with aquacel ag. Dress right foot with kerlix, and ace wraps then loosely. Patient to continue ABX per ID plan.         Josiah Saunders DPM PGY-3  Podiatric Medicine & Surgery  [facility]  Secure Chat Preferred  Mobile:  [phone]  Pager: [phone]

## 2022-11-28 NOTE — PLAN OF CARE
Problem: Physical Therapy  Goal: Physical Therapy Goal  Description: PT evaluation complete - no acute needs at this time.  Outcome: Met

## 2022-11-28 NOTE — PLAN OF CARE
[facility] Surg  Discharge Final Note    Primary Care Provider: BRAD Baker MD    Expected Discharge Date: 11/28/2022    Future Appointments   Date Time Provider Department Center   12/21/2022  8:30 AM DRE Baker MD [facility] [place]   1/17/2023  3:30 PM Akua Powell NP [facility] [place]          Final Discharge Note (most recent)       Final Note - 11/28/22 1003          Final Note    Hospital Resources/Appts/Education Provided Appointments scheduled and added to AVS;Post-Acute resouces added to AVS        Post-Acute Status    Post-Acute Authorization IV Infusion     IV Infusion Status Referral(s) sent     Discharge Delays None known at this time                     Important Message from Medicare             Contact Info       [facility]    [address] [address]   Phone: [phone]       Next Steps: Follow up today    BRAD Baker MD   Specialty: Internal Medicine   Relationship: PCP - General    [address] [address]   Phone: [phone]       Next Steps: Schedule an appointment as soon as possible for a visit in 1 week(s)    Instructions: For discharge from hospital follow up    [facility] INFECTIOUS DISEASE   Specialty: Infectious Diseases    [address] [address]   Phone: [phone]       Next Steps: Schedule an appointment as soon as possible for a visit    Instructions: As previously planned, For discharge from hospital follow up    Mingo Melara DPM   Specialty: Podiatry    [address] [address]   Phone: [phone]       Next Steps: Schedule an appointment as soon as possible for a visit in 1 week(s)    Instructions: As previously planned, Wound check          Nasra with scheduling will have Dr Baker's nurse contact Pt/spouse at home for an earlier appt if possible.

## 2022-11-28 NOTE — PT/OT/SLP EVAL
"Physical Therapy Evaluation & Discharge    Patient Name:  Billy Rivera   MRN:  [id number]  Admit Date: 11/22/2022  Admitting Diagnosis:  Diabetic foot infection  Length of Stay: 6 days  Recent Surgery: Procedure(s) (LRB):  AMPUTATION, TOE (Right) 3 Days Post-Op    Recommendations:     Discharge Recommendations:  home   Discharge Equipment Recommendations: none   Barriers to discharge: None    Assessment:     Billy Rivera is a 57 y.o. male admitted with a medical diagnosis of Diabetic foot infection. He is post op day 3 s/p R toe amputation. Today he was able to perform all functional mobility independently. At this time, patient is at their functional baseline and acute PT services are not indicated. PT will discontinue orders. See detailed evaluation below:    Problem List: orthopedic precautions    Plan:     Discharge PT orders. Please continue to encourage patient mobility.    Subjective   Communicated with RN prior to session.  Patient found HOB elevated upon PT entry to room, agreeable to evaluation. "I've been there, done that, got the t-shirt"    Chief Complaint: Wound Check (States  Wants middle toe on R foot amputated. Told to come to ED. Denies pain. )    Patient/Family Comments/goals: to go home  Pain/Comfort:  Pain Rating 1: 0/10  Pain Rating Post-Intervention 1: 0/10    Living Environment:  Patient lives with his wife in a single story home with 3 steps to enter.     Prior Level of Function:   Patient reports being independent with mobility & with ADLs. Patient uses DME as follows: none.     Roles/Repsonsibilities:   Work: Employed. Drive: yes. Managing Medicines/Managing Home: yes.     Patient reports they will have assistance from family upon discharge.    Objective:   Patient found with: peripheral IV     General Precautions: Standard, contact   Orthopedic Precautions:RLE weight bearing as tolerated (WB through heel with post op shoe)   Braces:  (DARCO shoe)   Oxygen Device: Room " "Air  Vitals: /73 (BP Location: Right arm, Patient Position: Lying)   Pulse (!) 55   Temp 98 °F (36.7 °C) (Oral)   Resp 18   Ht 6' 2" (1.88 m)   Wt 121.8 kg (268 lb 8.3 oz)   SpO2 95%   BMI 34.48 kg/m²     Exams:  Cognition:   Alert and Cooperative  AxOx4  Command following: Follows multistep  commands  Fluency: clear/fluent  Hearing: Intact  Vision:  wears glasses   Skin Integrity: foot wrapped  Sensation: decreased sensation to feet  Coordination: intact  LE Strength:  L Lower Extremity: WFL  R Lower Extremity: WFL  LE ROM:  L Lower Extremity: WFL  R Lower Extremity: WFL      Outcome Measures:  AM-PAC 6 CLICK MOBILITY  Turning over in bed (including adjusting bedclothes, sheets and blankets)?: 4  Sitting down on and standing up from a chair with arms (e.g., wheelchair, bedside commode, etc.): 4  Moving from lying on back to sitting on the side of the bed?: 4  Moving to and from a bed to a chair (including a wheelchair)?: 4  Need to walk in hospital room?: 4  Climbing 3-5 steps with a railing?: 4  Basic Mobility Total Score: 24     Functional Mobility:    Bed Mobility:  Supine to Sit: independence  Scooting anteriorly to EOB to have both feet planted on floor: independence    Sitting Balance at Edge of Bed:  Assistance Level Required: Coleman    Transfers:   Sit <> Stand Transfer: independence with no assistive device   Standing Tolerance: independence with no assistive device   Bed <> Chair Transfer: Step Transfer technique with independence with no assistive device    Gait:   Patient ambulated: laps around the room   Patient required: independent  Patient used: no assistive device  Gait Deviation(s): none    Education:   Patient was educated on continued safe mobility throughout this admission.       Patient left ambulatory in room/germain with all lines intact, call button in reach, and RN notified.    GOALS:   Multidisciplinary Problems       Physical Therapy Goals       Not on file              " Multidisciplinary Problems (Resolved)          Problem: Physical Therapy    Goal Priority Disciplines Outcome Goal Variances Interventions   Physical Therapy Goal   (Resolved)     PT, PT/OT Met     Description: PT evaluation complete - no acute needs at this time.                       History:     Past Medical History:   Diagnosis Date    Allergy     CAD (coronary artery disease), native coronary artery 6/25/2013    Cancer     COVID-19 virus detected 9/1/2020    Diabetes mellitus     Diabetes mellitus, type 2     Disorder of kidney and ureter     Heart attack 04/2012    Hx of colon cancer, stage I     Hyperlipidemia     Hypertension     Muscular pain     post-op after colonoscopy    NSTEMI May 2013 - peak troponin 0.22 5/22/2013    MICHAELA (obstructive sleep apnea)     Retinopathy due to secondary diabetes        Past Surgical History:   Procedure Laterality Date    COLONOSCOPY N/A 2/17/2017    Procedure: COLONOSCOPY;  Surgeon: Simon Gomez MD;  Location: [facility] (4TH FLR);  Service: Endoscopy;  Laterality: N/A;    CORONARY ANGIOPLASTY      INCISION AND DRAINAGE FOOT Right 6/5/2018    Procedure: INCISION AND DRAINAGE, FOOT;  Surgeon: Bridget Santana DPM;  [facility];  Service: Podiatry;  Laterality: Right;  Request mini C arm in room. request wound vac with medium black sponge    INCISION AND DRAINAGE FOOT Right 6/7/2018    Procedure: INCISION AND DRAINAGE, FOOT;  Surgeon: Emelia Brock DPM;  [facility];  Service: Podiatry;  Laterality: Right;    INCISION AND DRAINAGE FOOT Left 9/4/2020    Procedure: INCISION AND DRAINAGE, FOOT;  Surgeon: Ainsley Powell DPM;  Location: [facility] OR 2ND FLR;  Service: Podiatry;  Laterality: Left;    INCISION AND DRAINAGE FOOT Left 12/22/2020    Procedure: INCISION AND DRAINAGE, FOOT;  Surgeon: Ainsley Powell DPM;  Location: [facility] OR [address] [place] [address];  Service: Podiatry;  Laterality: Left;  May need micro choice  Need Jam shireena needles  Stretcher OK      INCISION  AND DRAINAGE OF ABSCESS Right 6/2/2018    Procedure: INCISION AND DRAINAGE, ABSCESS;  Surgeon: Bridget Santana DPM;  Location: [facility] OR 2ND FLR;  Service: General;  Laterality: Right;    OSTEOTOMY OF METATARSAL BONE  9/4/2020    Procedure: OSTEOTOMY, METATARSAL BONE, 1st METATARSAL RESECTION;  Surgeon: Ainsley Powell DPM;  Location: [facility] OR 2ND FLR;  Service: Podiatry;;    REMOVAL OF FOREIGN BODY FROM FOOT Right 6/7/2018    Procedure: REMOVAL, FOREIGN BODY, FOOT;  Surgeon: Emelia Brock DPM;  Location: [facility] OR 2ND FLR;  Service: Podiatry;  Laterality: Right;    TOE AMPUTATION Left 7/4/2020    Procedure: AMPUTATION, TOE;  Surgeon: Bridget Santana DPM;  Location: [facility] OR 2ND FLR;  Service: Podiatry;  Laterality: Left;    TOE AMPUTATION Left 10/14/2020    Procedure: AMPUTATION, TOE;  Surgeon: Mingo Melara DPM;  Location: [facility] OR 2ND FLR;  Service: Podiatry;  Laterality: Left;  stretcher OK    TOE AMPUTATION Right 6/9/2022    Procedure: AMPUTATION, TOE - Dr. Melara @ [place] in am;  Surgeon: Mingo Melara DPM;  Location: [facility] OR 1ST FLR;  Service: Podiatry;  Laterality: Right;    TOE AMPUTATION Right 11/23/2022    Procedure: AMPUTATION, TOE;  Surgeon: Hansa Robertson DPM;  Location: [facility] OR 2ND FLR;  Service: Podiatry;  Laterality: Right;    TOE AMPUTATION Right 11/25/2022    Procedure: AMPUTATION, TOE;  Surgeon: Chris Groves DPM;  Location: [facility] OR 2ND FLR;  Service: Podiatry;  Laterality: Right;    TONSILLECTOMY         Time Tracking:     PT Received On: 11/28/22  PT Start Time: 0810     PT Stop Time: 0823  PT Total Time (min): 13 min     Billable Minutes: Evaluation 13    Cielo Garcias PT, DPT  11/28/2022

## 2022-11-28 NOTE — PROCEDURES
"Billy Rviera is a 57 y.o. male patient.    Temp: 98 °F (36.7 °C) (11/28/22 0717)  Pulse: (!) 55 (11/28/22 0717)  Resp: 18 (11/28/22 0717)  BP: 139/73 (11/28/22 0922)  SpO2: 95 % (11/28/22 0717)  Weight: 121.8 kg (268 lb 8.3 oz) (11/27/22 2146)  Height: 6' 2" (188 cm) (11/27/22 2146)    PICC  Date/Time: 11/28/2022 11:27 AM  Performed by: Shereen Urbina RN  Assisting provider: Hailee Wilkins RN  Post-operative diagnosis: PICC line placement  Consent Done: Yes  Time out: Immediately prior to procedure a time out was called to verify the correct patient, procedure, equipment, support staff and site/side marked as required  Indications: med administration and vascular access  Anesthesia: local infiltration  Local anesthetic: lidocaine 1% without epinephrine  Anesthetic Total (mL): 2  Description of findings: PICC line placement  Preparation: skin prepped with ChloraPrep  Skin prep agent dried: skin prep agent completely dried prior to procedure  Sterile barriers: all five maximum sterile barriers used - cap, mask, sterile gown, sterile gloves, and large sterile sheet  Hand hygiene: hand hygiene performed prior to central venous catheter insertion  Location details: right basilic  Catheter type: double lumen  Catheter size: 5 Fr  Catheter Length: 40cm    Ultrasound guidance: yes  Vessel Caliber: medium and patent, compressibility normal  Needle advanced into vessel with real time Ultrasound guidance.  Guidewire confirmed in vessel.  Image recorded and saved.  Sterile sheath used.  Number of attempts: 1  Post-procedure: blood return through all ports, chlorhexidine patch and sterile dressing applied  Technical procedures used: 3CG  Specimens: No  Implants: No  Assessment: placement verified by x-ray  Complications: none        Name MARCUS Wilkins RN  11/28/2022    "

## 2022-11-28 NOTE — TELEPHONE ENCOUNTER
Pt is requesting that his [employer] insulin pump supplies prescription to be sent  directly to them     Pt states that you have the contact information

## 2022-11-28 NOTE — ASSESSMENT & PLAN NOTE
-s/p right 3rd toe amputation with Podiatry  - Intraop cultures pending  -RLE WB to heel in surgical shoe for transfers or short distances only. LLE WBAT.  -Dressing changes daily by nursing ordered per Podiatry; plan for HH.

## 2022-11-28 NOTE — TELEPHONE ENCOUNTER
----- Message from Nasra Cramer sent at 11/28/2022 12:37 PM CST -----  Contact: [phone]  Patient needs a Hosp follow up appt with their PCP only.       When is the next available appointment:  12/21    Symptoms:  HOSP F/U /DIABETIC  FOOT INFRCTION    Discharge date: 11/28    Needs to be seen by: 12/12    Would you prefer an answer via [employer]?: NO     Comments:

## 2022-11-28 NOTE — ASSESSMENT & PLAN NOTE
Hematology Oncology Progress Note Follow up for: RUE DVT Chart notes reviewed since last visit. Case discussed with following: no family at bedside Patient complains of the following: no new issues overnight. Tolerated her doses of IV iron without incident Additional concerns noted by the staff:  
 
Patient Vitals for the past 24 hrs: 
 BP Temp Pulse Resp SpO2  
02/01/19 0050 121/69 98.1 °F (36.7 °C) 60 12 95 % 01/31/19 2015 125/74 98.2 °F (36.8 °C) 66 18   
01/31/19 1639 127/84 99 °F (37.2 °C) 61 16   
01/31/19 1100 141/89 98.5 °F (36.9 °C) 60 16 99 % 01/31/19 0700 134/87 98.4 °F (36.9 °C) 64 16 99 % ROS: negative for 11 organ systems except as noted. Physical Examination: 
Constitutional Alert, cooperative, oriented. Mood and affect appropriate. Appears close to chronological age. Well nourished. Well developed. Head Normocephalic; no scars Eyes Conjunctivae and sclerae are clear and without icterus. Pupils are round ENMT Sinuses are nontender. No oral exudates, ulcers, masses, thrush or mucositis. Oropharynx clear. Tongue normal.  
Neck Supple without masses or thyromegaly. No jugular venous distension. Hematologic/Lymphatic No petechiae or purpura. No tender or palpable lymph nodes noted Respiratory Lungs are clear to auscultation without rhonchi or wheezing. Cardiovascular Regular rate and rhythm of heart without murmurs, gallops or rubs. Chest / Line Site Chest is symmetric with no chest wall deformities. Abdomen Non-tender, non-distended, no masses, ascites or hepatosplenomegaly. Good bowel sounds. Musculoskeletal No tenderness Extremities No visible deformities, no cyanosis, clubbing. Both legs and RUE puffy. Left leg with sutures over knee from recent surgery Skin No rashes, scars, or lesions suggestive of malignancy. No petechiae, purpura, or ecchymoses. No excoriations. Neurologic No sensory or motor deficits noted but not tested Patient's FSGs are uncontrolled.  Maintain anti-hyperglycemic dose as follows-   Endocrinology consulted and managing:  Antihyperglycemics (From admission, onward)    Start     Stop Route Frequency Ordered    11/28/22 1130  insulin aspart U-100 pen 15 Units         -- SubQ 3 times daily with meals 11/28/22 0853    11/28/22 0900  insulin detemir U-100 pen 20 Units         -- SubQ 2 times daily 11/28/22 0853    11/27/22 0907  insulin aspart U-100 pen 1-10 Units         -- SubQ Before meals & nightly PRN 11/27/22 0807         Psychiatric Alert and oriented. Coherent speech. Verbalizes understanding of our discussions today. Labs: 
Recent Results (from the past 24 hour(s)) PROTHROMBIN TIME + INR Collection Time: 02/01/19  2:08 AM  
Result Value Ref Range INR 1.7 (H) 0.9 - 1.1 Prothrombin time 17.0 (H) 9.0 - 11.1 sec Assessment and Plan:  
RUE DVT in patient with large clot burden and gastric bypass - has started on coumadin. Pharmacist is dosing- she got 5 mg last PM and unfortunately her INR dropped a bit. This agent has the advantage of being able to measure its effect and thus determine its absorption unlike the DOACS given her bypass. Decision re inpatient vs outpatient bridging entirely dependent on ability to get PT/INR back in timely manner to adjust the coumadin dosing. Her mobility is limited by her knees. Hypercoagulable workup underway - checking prothrombin mutation and FV Ledien mutation as these are most common and may impact family members. Still pending as of this AM 
 
Copper and zinc normal.  
 
Iron deficiency - got IV iron x 3 and will get another dose today. This will make total of 1200 mg which should be sufficient for several months given prior gastric bypass.

## 2022-11-28 NOTE — PLAN OF CARE
[facility]      HOME HEALTH ORDERS  FACE TO FACE ENCOUNTER    Patient Name: Billy Rivera  YOB: 1965    PCP: BRAD Baker MD   PCP Address: [address] / [place] [address]  PCP Phone Number: [phone]  PCP Fax: [phone]    Encounter Date: 11/22/22    Admit to Home Health    Diagnoses:  Active Hospital Problems    Diagnosis  POA    *Diabetic foot infection [E11.628, L08.9]  Yes    Ulcer of toe, right, with necrosis of bone [L97.514]  Yes    Right foot pain [M79.671]  Yes    CAD (coronary artery disease) [I25.10]  Yes     Chronic    Diabetic foot ulcer associated with type 2 diabetes mellitus [E11.621, L97.509]  Yes    Type 2 diabetes mellitus with diabetic peripheral angiopathy without gangrene, with long-term current use of insulin [E11.51, Z79.4]  Not Applicable     Chronic    Hypertension associated with diabetes [E11.59, I15.2]  Yes     Chronic      Resolved Hospital Problems    Diagnosis Date Resolved POA    Sleep apnea [G47.30] 11/22/2022 Yes     Chronic       Follow Up Appointments:  Future Appointments   Date Time Provider Department Center   12/7/2022  3:30 PM Yvonne Lopez MD [facility] [place]   12/21/2022  8:30 AM DRE Baker MD [facility] [place]   1/17/2023  3:30 PM Akua Powell NP [facility] [place]       Allergies:  Review of patient's allergies indicates:   Allergen Reactions    Penicillins Other (See Comments)     PCN allergy as a child - was told he went into a coma. Tolerates Cefazolin without adverse reactions    Shellfish containing products      Other reaction(s): Unknown    Vancomycin Itching     Tolerated vancomycin 7/2020    Bactrim  [sulfamethoxazole-trimethoprim] Rash       Medications: Review discharge medications with patient and family and provide education.    Current Facility-Administered Medications   Medication Dose Route Frequency Provider Last Rate Last Admin    acetaminophen tablet 650 mg  650 mg Oral Q4H PRN  Chris Groves DPM        albuterol-ipratropium 2.5 mg-0.5 mg/3 mL nebulizer solution 3 mL  3 mL Nebulization Q4H PRN Chris Groves DPM        ascorbic acid (vitamin C) tablet 500 mg  500 mg Oral Daily JAYNE CallM   500 mg at 11/28/22 0922    aspirin EC tablet 81 mg  81 mg Oral Daily JAYNE CallM   81 mg at 11/28/22 0922    atorvastatin tablet 80 mg  80 mg Oral QHS JAYNE CallM   80 mg at 11/27/22 2104    bisacodyL suppository 10 mg  10 mg Rectal Daily PRN Chris Groves DPM        carvediloL tablet 6.25 mg  6.25 mg Oral BID JAYNE CallM   6.25 mg at 11/28/22 0922    cefTRIAXone (ROCEPHIN) 2 g/50 mL D5W IVPB  2 g Intravenous Q24H Cielo Almazan PA-C   Stopped at 11/27/22 1515    dextrose 10% bolus 125 mL  12.5 g Intravenous PRN Chris Groves, DAE        dextrose 10% bolus 250 mL  25 g Intravenous PRN Chris Groves DPM        glucagon (human recombinant) injection 1 mg  1 mg Intramuscular PRN Chris Groves DPM        glucose chewable tablet 16 g  16 g Oral PRN Chris Groves DPM        glucose chewable tablet 24 g  24 g Oral PRN Chris Groves DPM        haloperidol lactate injection 0.5 mg  0.5 mg Intravenous Q10 Min PRN Melody Suazo MD        heparin (porcine) injection 5,000 Units  5,000 Units Subcutaneous Q8H JAYNE CallM   5,000 Units at 11/28/22 0619    HYDROmorphone injection 0.2 mg  0.2 mg Intravenous Q10 Min PRN Melody Suazo MD        insulin aspart U-100 pen 1-10 Units  1-10 Units Subcutaneous QID (AC + HS) PRN Kenneth Hughes DNP, FNP   4 Units at 11/28/22 0924    insulin aspart U-100 pen 15 Units  15 Units Subcutaneous TIDWM Kenneth Hughes DNP, CHETNA        insulin detemir U-100 pen 20 Units  20 Units Subcutaneous BID Kenneth Hughes DNP, FNP   20 Units at 11/28/22 0923    lisinopriL tablet 40 mg  40 mg Oral Daily Chris Groves DPM   40 mg at 11/28/22 0922    melatonin tablet 6 mg  6 mg Oral Nightly PRN JAYNE CallM         "naloxone 0.4 mg/mL injection 0.02 mg  0.02 mg Intravenous PRN Chris Groves, DPM        ondansetron disintegrating tablet 8 mg  8 mg Oral Q8H PRN Chris Groves, DPM        oxyCODONE immediate release tablet 5 mg  5 mg Oral Q6H PRN Chris Groves, DPM        oxyCODONE immediate release tablet Tab 10 mg  10 mg Oral Q6H PRN Chris Groves, DPM        polyethylene glycol packet 17 g  17 g Oral Daily PRN Chris Groves, DPM        promethazine tablet 25 mg  25 mg Oral Q6H PRN Chris Groves, DPM        sodium chloride 0.9% flush 10 mL  10 mL Intravenous Q12H PRN Chris Groves, DPM        sodium chloride 0.9% flush 10 mL  10 mL Intravenous PRN Chris Groves, DPM        sodium chloride 0.9% flush 10 mL  10 mL Intravenous Q6H Gisella Cheng MD        And    sodium chloride 0.9% flush 10 mL  10 mL Intravenous PRN Gisella Cheng MD         Current Discharge Medication List        START taking these medications    Details   cefTRIAXone (ROCEPHIN) 2 g/50 mL PgBk IVPB Inject 50 mLs (2 g total) into the vein Daily.  Refills: 0           CONTINUE these medications which have CHANGED    Details   carvediloL (COREG) 12.5 MG tablet Take 0.5 tablets (6.25 mg total) by mouth 2 (two) times daily with meals.  Qty: 180 tablet, Refills: 3    Comments: .      insulin lispro (HUMALOG U-100 INSULIN) 100 unit/mL injection Inject 15 Units into the skin 3 (three) times daily before meals. Gives via insulin pump only. Do not Directly inject.    Comments: Has Dine in voucher to make it $35/month  Associated Diagnoses: Type 2 diabetes mellitus 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, with long-term current use of insulin      pen needle, diabetic 31 gauge x 3/16" Ndle Inject 1 each into the skin 4 (four) times daily.    Associated Diagnoses: Type 2 diabetes mellitus without complication, with long-term current use of insulin           CONTINUE these medications which have NOT CHANGED    Details "   acetaminophen (TYLENOL) 500 MG tablet Take 1,000 mg by mouth daily as needed for Pain.      ascorbic acid, vitamin C, (VITAMIN C) 250 MG tablet Take 500 mg by mouth once daily.      aspirin (ECOTRIN) 81 MG EC tablet Take 1 tablet (81 mg total) by mouth once daily.  Refills: 0      atorvastatin (LIPITOR) 80 MG tablet Take 1 tablet (80 mg total) by mouth once daily. Take every night.  Qty: 90 tablet, Refills: 3      bismuth subsalicylate (PEPTO BISMOL) 262 mg/15 mL suspension Take 15 mLs by mouth daily as needed (diarrhea).      dapagliflozin (FARXIGA) 5 mg Tab tablet Take 1 tablet (5 mg total) by mouth once daily.  Qty: 30 tablet, Refills: 4    Associated Diagnoses: Type 2 diabetes mellitus 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, with long-term current use of insulin      lisinopriL (PRINIVIL,ZESTRIL) 40 MG tablet Take 1 tablet by mouth once daily  Qty: 30 tablet, Refills: 0      metFORMIN (GLUCOPHAGE) 1000 MG tablet Take 1 tablet (1,000 mg total) by mouth 2 (two) times daily with meals.  Qty: 180 tablet, Refills: 3    Associated Diagnoses: Type 2 diabetes mellitus with hyperglycemia, with long-term current use of insulin      multivit-min/folic/vit K/lycop (MEN'S MULTIVITAMIN ORAL) Take 1 tablet by mouth once daily.      TRULICITY 1.5 mg/0.5 mL pen injector Inject 1.5 mg into the skin every 7 days.  Qty: 4 pen, Refills: 11    Associated Diagnoses: Type 2 diabetes mellitus with diabetic peripheral angiopathy without gangrene, with long-term current use of insulin      blood sugar diagnostic (ACCU-CHEK GUIDE TEST STRIPS) Strp 1 each by Misc.(Non-Drug; Combo Route) route 5 (five) times daily.  Qty: 150 each, Refills: 11    Comments: To use with Medtronic insulin pump  Associated Diagnoses: Uncontrolled type 2 diabetes mellitus with hyperglycemia      insulin (LANTUS SOLOSTAR U-100 INSULIN) glargine 100 units/mL SubQ pen Inject 50 Units into the skin once daily. USE AS  BACK UP INSULIN ONLY - EMERGENCY USE IF YOUR ARE OFF OF YOUR INSULIN PUMP  Qty: 15 mL, Refills: 1    Associated Diagnoses: Type 2 diabetes mellitus 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, with long-term current use of insulin      lancets (ACCU-CHEK FASTCLIX LANCET DRUM) Misc 1 each by Misc.(Non-Drug; Combo Route) route Daily.  Qty: 30 each, Refills: 11    Comments: 30 drums per month to use with Medtronic insulin pump  Associated Diagnoses: Uncontrolled type 2 diabetes mellitus with hyperglycemia      MINIMED 770G INSULIN PUMP Misc 1 each by Misc.(Non-Drug; Combo Route) route continuous. Medically necessary for management of Type 2 diabetes, E11.65  Qty: 1 each, Refills: 0    Associated Diagnoses: Type 2 diabetes mellitus 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, with long-term current use of insulin           STOP taking these medications       aspirin 325 MG tablet Comments:   Reason for Stopping:         ciprofloxacin HCl (CIPRO) 500 MG tablet Comments:   Reason for Stopping:         ibuprofen (ADVIL,MOTRIN) 200 MG tablet Comments:   Reason for Stopping:                 I have seen and examined this patient within the last 30 days. My clinical findings that support the need for the home health skilled services and home bound status are the following:no   Requiring assistive device to leave home due to unsteady gait caused by  Infection and Surgery.     Diet:   diabetic diet 2200 calorie     Labs:  Order the following labs to be drawn on Mondays:   CBC  CMP   CRP   Fax Lab Results to Infectious Diseases Provider:  Cielo Almazan PA-C.  [facility] Fax Number: [phone]     Referrals/ Consults  Physical Therapy to evaluate and treat. Evaluate for home safety and equipment needs; Establish/upgrade home exercise program. Perform / instruct on therapeutic exercises, gait training, transfer training, and Range of  Motion.  Occupational Therapy to evaluate and treat. Evaluate home environment for safety and equipment needs. Perform/Instruct on transfers, ADL training, ROM, and therapeutic exercises.   to evaluate for community resources/long-range planning.    Activities:   activity as tolerated.  RLE WB to heel in surgical shoe for transfers or short distances only. LLE WBAT. Patient to keep dressing clean dry and intact.     Nursing:   Agency to admit patient within 24 hours of hospital discharge unless specified on physician order or at patient request    SN to complete comprehensive assessment including routine vital signs. Instruct on disease process and s/s of complications to report to MD. Review/verify medication list sent home with the patient at time of discharge  and instruct patient/caregiver as needed. Frequency may be adjusted depending on start of care date.     Skilled nurse to perform up to 3 visits PRN for symptoms related to diagnosis    Notify MD if SBP > 160 or < 90; DBP > 90 or < 50; HR > 120 or < 50; Temp > 101; O2 < 88%    Ok to schedule additional visits based on staff availability and patient request on consecutive days within the home health episode.    When multiple disciplines ordered:    Start of Care occurs on Sunday - Wednesday schedule remaining discipline evaluations as ordered on separate consecutive days following the start of care.    Thursday SOC -schedule subsequent evaluations Friday and Monday the following week.     Friday - Saturday SOC - schedule subsequent discipline evaluations on consecutive days starting Monday of the following week.    For all post-discharge communication and subsequent orders please contact patient's primary care physician. If unable to reach primary care physician or do not receive response within 30 minutes, please contact [facility] for clinical staff order clarification    Miscellaneous   Home Infusion Therapy:   SN to perform Infusion  Therapy/Central Line Care.  Review Central Line Care & Central Line Flush with patient.    Administer (drug and dose): Ceftriaxone 2 g IV q 24 hours  x 6 weeks - estimated end date 1/7/22    Scrub the Hub: Prior to accessing the line, always perform a 30 second alcohol scrub  Each lumen of the central line is to be flushed at least daily with 10 mL Normal Saline and 3 mL Heparin flush (10 units/mL)  Skilled Nurse (SN) may draw blood from IV access  Blood Draw Procedure:   - Aspirate at least 5 mL of blood   - Discard   - Obtain specimen   - Change injection cap   - Flush with 20 mL Normal Saline followed by a                 3-5 mL Heparin flush (10 units/mL)  Central :   - Sterile dressing changes are done weekly and as needed.   - Use chlor-hexadine scrub to cleanse site, apply Biopatch to insertion site,       apply securement device dressing   - Injection caps are changed weekly and after EVERY lab draw.   - If sterile gauze is under dressing to control oozing,                 dressing change must be performed every 24 hours until gauze is not needed.    Home Health Aide:  Nursing Daily, Physical Therapy Twice weekly, and Occupational Therapy Twice weekly    Wound Care Orders  Home health for daily dressing changes to right foot as follows: Cover 3rd right toe amputation site with aqucel ag and plantar 2nd met head wound with aquacel ag. Dress right foot with kerlix, and ace wraps loosely.     I certify that this patient is confined to his home and needs intermittent skilled nursing care, physical therapy, and occupational therapy.    Gisella Cheng MD

## 2022-11-28 NOTE — ASSESSMENT & PLAN NOTE
Right foot pain  Pt with 2 previous toe amputations for similar infections - left great toe and right 5th toe - most recent 06/2022. Previous cultures 06/2022 significant for Strept agalactiae (GBS) and Prevotella bivia. Cultures by Podiatry clinic 11/15/2022 significant for Staph aureus and Pasturella multocida. ESR & CRP elevated. WBC without leukocytosis  - current blood cultures NGTD  - Podiatry consulted, S/p amputation of 3rd R toe on 11/25.  - Was on ceftriaxone from pre-ampuation bone bx  - keep foot elevated and dry  - ID consulted - plan for 6 weeks ceftriaxone post op - end date 1/7/2022.

## 2022-11-28 NOTE — ASSESSMENT & PLAN NOTE
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 X ray unremarkable. MRI + for right 3rd middle and proximal phalanx OM. Bone culture + for Staph aureus and Pasteurella. Pedal pulses diminished with R CODY 1.1 and L VASILIY 1.2. patient now s/p right 3rd toe amputation with bedside closure.     Plan:  -Bedside surgical site closure right foot performed with 3 mL 2% lidocaine plain, 2 vicryl sutures and 5 nylon sutures. No complications.   -Antibiotic plan per primary team.   -RLE WB to heel in surgical shoe for transfers or short distances. LLE WBAT.  -Dressing changes by nursing ordered.   -Podiatry will follow.   Discharge instructions: Patient to follow up with Dr. Melara in podiatry clinic within 1 week of discharge. Patient to RLE WB to heel in surgical shoe for transfers or short distances. LLE WBAT. Patient to keep dressing clean dry and intact. Home health or SNF for dressing changes to right foot as follows: Coveer 3rd right toe amputation site with aqucel ag and plantar 2nd met head wound with aquacel ag. Dress right foot with kerlix, and ace wraps then loosely. Patient to continue ABX per ID plan.

## 2022-11-28 NOTE — PLAN OF CARE
11/28/22 1003   Post-Acute Status   Post-Acute Authorization IV Infusion   IV Infusion Status Referral(s) sent   Hospital Resources/Appts/Education Provided Appointments scheduled and added to AVS;Post-Acute resouces added to AVS   Discharge Delays None known at this time   Discharge Plan   Discharge Plan A Home Health   Discharge Plan B Home with family

## 2022-11-28 NOTE — PLAN OF CARE
[facility]    Referral received for home infusion of IV antibiotics. Pending line placement, HH and ID OPAT when final recs are in. Will follow.    [facility]  Pamela Najera RN, Clinical Educator  Cell [phone]  Office [phone]  Fax [phone]

## 2022-11-28 NOTE — PLAN OF CARE
Problem: Adult Inpatient Plan of Care  Goal: Plan of Care Review  Outcome: Met  Goal: Patient-Specific Goal (Individualized)  Outcome: Met  Goal: Absence of Hospital-Acquired Illness or Injury  Outcome: Met  Goal: Optimal Comfort and Wellbeing  Outcome: Met  Goal: Readiness for Transition of Care  Outcome: Met     Problem: Infection  Goal: Absence of Infection Signs and Symptoms  Outcome: Met     Problem: Diabetes Comorbidity  Goal: Blood Glucose Level Within Targeted Range  Outcome: Met     Problem: Impaired Wound Healing  Goal: Optimal Wound Healing  Outcome: Met     Problem: Fall Injury Risk  Goal: Absence of Fall and Fall-Related Injury  Outcome: Met  D/C instructions reviewed, questions answered and copy of instructions given to patient who verbalized understanding.  IV antibiotic administration instructed by Infusion company representative and patient comfortable with that as well.  W/C transportation requested to take to ride.

## 2022-11-28 NOTE — TELEPHONE ENCOUNTER
----- Message from Yang Ríos sent at 11/28/2022  3:37 PM CST -----  Regarding: Appointment  Contact: [phone]  Pt is needing to get an appointment scheduled soon. He is getting out of the hospital from getting his toe removed. Please call to discuss further @ [phone]

## 2022-11-28 NOTE — PLAN OF CARE
Problem: Adult Inpatient Plan of Care  Goal: Plan of Care Review  Outcome: Ongoing, Progressing     Problem: Adult Inpatient Plan of Care  Goal: Plan of Care Review  Outcome: Ongoing, Progressing  Goal: Patient-Specific Goal (Individualized)  Outcome: Ongoing, Progressing  Goal: Absence of Hospital-Acquired Illness or Injury  Outcome: Ongoing, Progressing  Goal: Optimal Comfort and Wellbeing  Outcome: Ongoing, Progressing     Problem: Adult Inpatient Plan of Care  Goal: Patient-Specific Goal (Individualized)  Outcome: Ongoing, Progressing     Problem: Adult Inpatient Plan of Care  Goal: Absence of Hospital-Acquired Illness or Injury  Outcome: Ongoing, Progressing     Problem: Adult Inpatient Plan of Care  Goal: Optimal Comfort and Wellbeing  Outcome: Ongoing, Progressing

## 2022-11-28 NOTE — ASSESSMENT & PLAN NOTE
57-year-old man with CAD, HTN, DM2, diabetic foot infections, osteomyelitis of the left foot s/p left hallux amputation and partial left 2nd toe amputation (2020) and right 5th ray amputation (06/09/22) admitted for Right third toe infection/osteomyelitis, with plantar diabetic ulcer wound at 2nd met head which probes to bone per podiatry consult inpatient, thus clinically concerning for possible osteo (MRI did not show OM involving affected 2nd met head).    Outpatient podiatry bone culture of right third toe 11/15 grew MSSA and Pasturella. Podiatry took patient to the OR on (11/25) for right third toe amputation. Surgical cx+ Diptheroids, Gram stain with GPCs. Right 3rd toe amputation performed with 2-sections of amputated toe sent for pathology; remains uncertain if biopsy of bone for clean margin collected / sent. ID consulted for antibiotic recommendations. Patient is overall doing well. Afebrile. Pt reports needing to be discharged soon to take care of his wife, who is acutely ill. Patient was found to be discharged already, during ID rounds.  Patient d/c with tentative recs to continue IV-ceftriaxone (vs. PO-doxy in interim, while pt tends to acutely ill wife, and to resume IV abx once able to return to ID-clinic).    Recommendations:  1. Continue IV-Ceftriaxone 2 g q 24 hours for pasturella & MSSA (11/15), and Diphtheroids (11/25; GPCs on gram stain)  2. To complete 6-weeks (s/p 11/25, surg date of right 3rd toe amputation) of antibiotic therapy for osteomyelitis -- (due to inability to confirm clean  margins, and due to clinical concerns for possible OM involving present 2nd MTPJ given associated plantar wound probe to bone).  3. ID will schedule patient for outpt ID-clinic f/u appt     -- discussed with ID staff.  -- ID will sign off, see OPAT note below.        Outpatient Antibiotic Therapy Plan:    Please send referral to [facility].    1) Infection:  Osteomyelitis,  right 3rd toe (amputated 11/25); clinical concern for OM of 2nd MTPJ    2) Discharge Antibiotics:    Intravenous antibiotics:   IV Ceftriaxone       3) Therapy Duration:  6wks s/p 11/25/22    Estimated end date of IV antibiotics: 01/06/23    4) Outpatient Weekly Labs:    Order the following labs to be drawn on Mondays:    CBC   CMP    CRP     CPK (when on Daptomycin)   Vancomycin trough. Target 15-20    If discharged on vancomycin IV, order the following additional labs to be drawn on Thursdays:   CMP    Vancomycin trough. Target 15-20    If vancomycin trough is not at target (15-20) prior to discharge, schedule vancomycin trough to be drawn before their fourth outpatient dose.    5) Fax Lab Results to Infectious Diseases Provider:  Billy Dias PA-C    [facility] Fax Number: [phone]    6) Outpatient Infectious Diseases Follow-up     Follow-up appointment will be arranged by the ID clinic and will be found in the patient's appointments tab.

## 2022-11-29 PROCEDURE — G0180 PR HOME HEALTH MD CERTIFICATION: ICD-10-PCS | Mod: ,,, | Performed by: INTERNAL MEDICINE

## 2022-11-29 PROCEDURE — G0180 MD CERTIFICATION HHA PATIENT: HCPCS | Mod: ,,, | Performed by: INTERNAL MEDICINE

## 2022-11-29 NOTE — SUBJECTIVE & OBJECTIVE
Interval History: Patient remains afebrile, and without leukocytosis.  Blood cxs 11/22: NG  Bone cx positive (11/15) MSSA, Pasteurella, and (11/25) diptheroids, GPCs on gram stain.  CRP (11) improved from (15.6) at admit 11/22.    Review of Systems   Constitutional:         Unable to update, pt d/c prior to ID rounds.   Objective:     Vital Signs (Most Recent):  Temp: 99.1 °F (37.3 °C) (11/28/22 1527)  Pulse: 60 (11/28/22 1527)  Resp: 18 (11/28/22 1527)  BP: 135/81 (11/28/22 1527)  SpO2: 96 % (11/28/22 1527) Vital Signs (24h Range):  Temp:  [97.4 °F (36.3 °C)-99.1 °F (37.3 °C)] 99.1 °F (37.3 °C)  Pulse:  [54-60] 60  Resp:  [17-18] 18  SpO2:  [93 %-96 %] 96 %  BP: (112-147)/(60-81) 135/81     Weight: 121.8 kg (268 lb 8.3 oz)  Body mass index is 34.48 kg/m².    Estimated Creatinine Clearance: 124 mL/min (based on SCr of 0.9 mg/dL).    Physical Exam  Constitutional:       Comments: Unable to update, pt d/c prior to ID rounds.       Significant Labs: All pertinent labs within the past 24 hours have been reviewed.    Significant Imaging: I have reviewed all pertinent imaging results/findings within the past 24 hours.

## 2022-11-29 NOTE — PROGRESS NOTES
[facility] Surg  Infectious Disease  Progress Note    Patient Name: Billy Yatesn  MRN: [id number]  Admission Date: 11/22/2022  Length of Stay: 6 days  Attending Physician: No att. providers found  Primary Care Provider: BRAD Baker MD    Isolation Status: No active isolations  Assessment/Plan:      * Diabetic foot infection  57-year-old man with CAD, HTN, DM2, diabetic foot infections, osteomyelitis of the left foot s/p left hallux amputation and partial left 2nd toe amputation (2020) and right 5th ray amputation (06/09/22) admitted for Right third toe infection/osteomyelitis, with plantar diabetic ulcer wound at 2nd met head which probes to bone per podiatry consult inpatient, thus clinically concerning for possible osteo (MRI did not show OM involving affected 2nd met head).    Outpatient podiatry bone culture of right third toe 11/15 grew MSSA and Pasturella. Podiatry took patient to the OR on (11/25) for right third toe amputation. Surgical cx+ Diptheroids, Gram stain with GPCs. Right 3rd toe amputation performed with 2-sections of amputated toe sent for pathology; remains uncertain if biopsy of bone for clean margin collected / sent. ID consulted for antibiotic recommendations. Patient is overall doing well. Afebrile. Pt reports needing to be discharged soon to take care of his wife, who is acutely ill. Patient was found to be discharged already, during ID rounds.  Patient d/c with tentative recs to continue IV-ceftriaxone (vs. PO-doxy in interim, while pt tends to acutely ill wife, and to resume IV abx once able to return to ID-clinic).    Recommendations:  1. Continue IV-Ceftriaxone 2 g q 24 hours for pasturella & MSSA (11/15), and Diphtheroids (11/25; GPCs on gram stain)  2. To complete 6-weeks (s/p 11/25, surg date of right 3rd toe amputation) of antibiotic therapy for osteomyelitis -- (due to inability to confirm clean  margins, and due to clinical concerns for possible OM involving present  2nd MTPJ given associated plantar wound probe to bone).  3. ID will schedule patient for outpt ID-clinic f/u appt     -- discussed with ID staff.  -- ID will sign off, see OPAT note below.        Outpatient Antibiotic Therapy Plan:    Please send referral to [facility].    1) Infection:  Osteomyelitis, right 3rd toe (amputated 11/25); clinical concern for OM of 2nd MTPJ    2) Discharge Antibiotics:    Intravenous antibiotics:   IV Ceftriaxone       3) Therapy Duration:  6wks s/p 11/25/22    Estimated end date of IV antibiotics: 01/06/23    4) Outpatient Weekly Labs:    Order the following labs to be drawn on Mondays:    CBC   CMP    CRP     CPK (when on Daptomycin)   Vancomycin trough. Target 15-20    If discharged on vancomycin IV, order the following additional labs to be drawn on Thursdays:   CMP    Vancomycin trough. Target 15-20    If vancomycin trough is not at target (15-20) prior to discharge, schedule vancomycin trough to be drawn before their fourth outpatient dose.    5) Fax Lab Results to Infectious Diseases Provider:  Billy Dias PA-C    [facility] Fax Number: [phone]    6) Outpatient Infectious Diseases Follow-up     Follow-up appointment will be arranged by the ID clinic and will be found in the patient's appointments tab.                      Thank you for your consult. I will sign off. Please contact us if you have any additional questions.    Billy Dias PA-C  Infectious Disease  [facility] - Med Surg    Subjective:     Principal Problem:Diabetic foot infection    HPI: Mr. Rivera is a 57-year-old man with CAD, HTN, DM2, recurrent diabetic foot infection, osteomyelitis of the left foot s/p left hallux amputation and partial left 2nd toe amputation (2020) and right 5th ray amputation admitted for right third toe infection.     Patient is followed closely in podiatry clinic. On 11/15 underwent right third toe bone biopsy and cultures grew MSSA  and Pasturella. He was started on doxy/cipro. On 11/22 he followed up in clinic and there was increased redness, swelling and malodor to the right 3rd digit. He also has a second met head plantar foot  wound that probes to bone. He was admitted for further evaluation and management. Patient started on empiric Vancomycin and Ceftriaxone.  MRI showed osteomyelitis involving the proximal middle phalanges of the 3rd toe.  There is associated ulceration along the medial aspect of the toe near the PIP joint, with fluid tracking dorsally.  No discrete rim enhancing fluid collection. Podiatry took patient to the OR on 11/25 for right third toe amputation. Surgical cx are in process (It does not appear clean margin bone cultures were sent). Gram stain with GPCs. ID consulted for antibiotic recommendations. Patient is overall doing well. Afebrile. Reports needing to be discharged soon to take care of his wife who broke her foot.      Interval History: Patient remains afebrile, and without leukocytosis.  Blood cxs 11/22: NG  Bone cx positive (11/15) MSSA, Pasteurella, and (11/25) diptheroids, GPCs on gram stain.  CRP (11) improved from (15.6) at admit 11/22.    Review of Systems   Constitutional:         Unable to update, pt d/c prior to ID rounds.   Objective:     Vital Signs (Most Recent):  Temp: 99.1 °F (37.3 °C) (11/28/22 1527)  Pulse: 60 (11/28/22 1527)  Resp: 18 (11/28/22 1527)  BP: 135/81 (11/28/22 1527)  SpO2: 96 % (11/28/22 1527) Vital Signs (24h Range):  Temp:  [97.4 °F (36.3 °C)-99.1 °F (37.3 °C)] 99.1 °F (37.3 °C)  Pulse:  [54-60] 60  Resp:  [17-18] 18  SpO2:  [93 %-96 %] 96 %  BP: (112-147)/(60-81) 135/81     Weight: 121.8 kg (268 lb 8.3 oz)  Body mass index is 34.48 kg/m².    Estimated Creatinine Clearance: 124 mL/min (based on SCr of 0.9 mg/dL).    Physical Exam  Constitutional:       Comments: Unable to update, pt d/c prior to ID rounds.       Significant Labs: All pertinent labs within the past 24 hours have  been reviewed.    Significant Imaging: I have reviewed all pertinent imaging results/findings within the past 24 hours.

## 2022-11-29 NOTE — CARE UPDATE
Care Update:     No acute events overnight. Patient on the POSS in room 603/603 A. Blood glucose stable. BG above goal on current insulin regimen (SSI, prandial, and basal insulin ). Steroid use- None. 3 Days Post-Op  Renal function- Normal   Vasopressors-  None       Diet diabetic     POCT Glucose   Date Value Ref Range Status   11/28/2022 229 (H) 70 - 110 mg/dL Final   11/28/2022 220 (H) 70 - 110 mg/dL Final   11/27/2022 242 (H) 70 - 110 mg/dL Final   11/27/2022 210 (H) 70 - 110 mg/dL Final   11/27/2022 184 (H) 70 - 110 mg/dL Final   11/27/2022 200 (H) 70 - 110 mg/dL Final   11/26/2022 243 (H) 70 - 110 mg/dL Final   11/26/2022 319 (H) 70 - 110 mg/dL Final   11/26/2022 233 (H) 70 - 110 mg/dL Final   11/26/2022 197 (H) 70 - 110 mg/dL Final   11/25/2022 152 (H) 70 - 110 mg/dL Final     Lab Results   Component Value Date    HGBA1C 7.1 (H) 11/10/2022       Endocrinology consulted for BG management.   BG goal 140-180    Diabetes Medications               dapagliflozin (FARXIGA) 5 mg Tab tablet Take 1 tablet (5 mg total) by mouth once daily.    insulin (LANTUS SOLOSTAR U-100 INSULIN) glargine 100 units/mL SubQ pen Inject 50 Units into the skin once daily. USE AS BACK UP INSULIN ONLY - EMERGENCY USE IF YOUR ARE OFF OF YOUR INSULIN PUMP    insulin lispro (HUMALOG U-100 INSULIN) 100 unit/mL injection Inject 15 Units into the skin 3 (three) times daily before meals. Gives via insulin pump only. Do not Directly inject.    metFORMIN (GLUCOPHAGE) 1000 MG tablet Take 1 tablet (1,000 mg total) by mouth 2 (two) times daily with meals.    TRULICITY 1.5 mg/0.5 mL pen injector Inject 1.5 mg into the skin every 7 days.    TRULICITY 1.5 mg/0.5 mL pen injector Inject 1.5 mg into the skin every 7 days.          Hospital Medications    Endocrinology consulted for BG management.   BG goal 140-180    - Levemir (Insulin Detemir) 20 units BID  - Novolog (Insulin Aspart) 15 units TIDWM and prn for BG excursions [facility] SSI (150/25)  - BG checks  AC/HS  - Hypoglycemia protocol in place  - If blood glucose greater than 300, please ask patient not to eat food or drink anything other than water until correctional insulin has brought it back below 250    ** Please notify Endocrine for any change and/or advance in diet**  ** Please call Endocrine for any BG related issues **      Discharge Planning:   - Can continue home insulin pump.      Kenneth Hughes DNP, FNP-C  Department of Endocrinology  Inpatient Glycemic Management

## 2022-11-30 LAB — BACTERIA SPEC ANAEROBE CULT: NORMAL

## 2022-12-01 ENCOUNTER — OFFICE VISIT (OUTPATIENT)
Dept: PODIATRY | Facility: CLINIC | Age: 57
End: 2022-12-01
Payer: COMMERCIAL

## 2022-12-01 VITALS
WEIGHT: 268.5 LBS | HEIGHT: 74 IN | DIASTOLIC BLOOD PRESSURE: 75 MMHG | SYSTOLIC BLOOD PRESSURE: 115 MMHG | BODY MASS INDEX: 34.46 KG/M2 | HEART RATE: 64 BPM

## 2022-12-01 DIAGNOSIS — L97.512 FOOT ULCER, RIGHT, WITH FAT LAYER EXPOSED: ICD-10-CM

## 2022-12-01 DIAGNOSIS — E11.49 TYPE II DIABETES MELLITUS WITH NEUROLOGICAL MANIFESTATIONS: Primary | ICD-10-CM

## 2022-12-01 PROCEDURE — 99024 POSTOP FOLLOW-UP VISIT: CPT | Mod: S$GLB,,, | Performed by: PODIATRIST

## 2022-12-01 PROCEDURE — 4010F ACE/ARB THERAPY RXD/TAKEN: CPT | Mod: CPTII,S$GLB,, | Performed by: PODIATRIST

## 2022-12-01 PROCEDURE — 3066F NEPHROPATHY DOC TX: CPT | Mod: CPTII,S$GLB,, | Performed by: PODIATRIST

## 2022-12-01 PROCEDURE — 3074F SYST BP LT 130 MM HG: CPT | Mod: CPTII,S$GLB,, | Performed by: PODIATRIST

## 2022-12-01 PROCEDURE — 3051F PR MOST RECENT HEMOGLOBIN A1C LEVEL 7.0 - < 8.0%: ICD-10-PCS | Mod: CPTII,S$GLB,, | Performed by: PODIATRIST

## 2022-12-01 PROCEDURE — 3078F DIAST BP <80 MM HG: CPT | Mod: CPTII,S$GLB,, | Performed by: PODIATRIST

## 2022-12-01 PROCEDURE — 99999 PR PBB SHADOW E&M-EST. PATIENT-LVL IV: CPT | Mod: PBBFAC,,, | Performed by: PODIATRIST

## 2022-12-01 PROCEDURE — 99999 PR PBB SHADOW E&M-EST. PATIENT-LVL IV: ICD-10-PCS | Mod: PBBFAC,,, | Performed by: PODIATRIST

## 2022-12-01 PROCEDURE — 3066F PR DOCUMENTATION OF TREATMENT FOR NEPHROPATHY: ICD-10-PCS | Mod: CPTII,S$GLB,, | Performed by: PODIATRIST

## 2022-12-01 PROCEDURE — 3008F BODY MASS INDEX DOCD: CPT | Mod: CPTII,S$GLB,, | Performed by: PODIATRIST

## 2022-12-01 PROCEDURE — 1111F DSCHRG MED/CURRENT MED MERGE: CPT | Mod: CPTII,S$GLB,, | Performed by: PODIATRIST

## 2022-12-01 PROCEDURE — 99024 PR POST-OP FOLLOW-UP VISIT: ICD-10-PCS | Mod: S$GLB,,, | Performed by: PODIATRIST

## 2022-12-01 PROCEDURE — 3060F PR POS MICROALBUMINURIA RESULT DOCUMENTED/REVIEW: ICD-10-PCS | Mod: CPTII,S$GLB,, | Performed by: PODIATRIST

## 2022-12-01 PROCEDURE — 99214 OFFICE O/P EST MOD 30 MIN: CPT | Mod: 25,S$GLB,, | Performed by: PODIATRIST

## 2022-12-01 PROCEDURE — 3074F PR MOST RECENT SYSTOLIC BLOOD PRESSURE < 130 MM HG: ICD-10-PCS | Mod: CPTII,S$GLB,, | Performed by: PODIATRIST

## 2022-12-01 PROCEDURE — 1111F PR DISCHARGE MEDS RECONCILED W/ CURRENT OUTPATIENT MED LIST: ICD-10-PCS | Mod: CPTII,S$GLB,, | Performed by: PODIATRIST

## 2022-12-01 PROCEDURE — 3008F PR BODY MASS INDEX (BMI) DOCUMENTED: ICD-10-PCS | Mod: CPTII,S$GLB,, | Performed by: PODIATRIST

## 2022-12-01 PROCEDURE — 4010F PR ACE/ARB THEARPY RXD/TAKEN: ICD-10-PCS | Mod: CPTII,S$GLB,, | Performed by: PODIATRIST

## 2022-12-01 PROCEDURE — 99214 PR OFFICE/OUTPT VISIT, EST, LEVL IV, 30-39 MIN: ICD-10-PCS | Mod: 25,S$GLB,, | Performed by: PODIATRIST

## 2022-12-01 PROCEDURE — 3060F POS MICROALBUMINURIA REV: CPT | Mod: CPTII,S$GLB,, | Performed by: PODIATRIST

## 2022-12-01 PROCEDURE — 3051F HG A1C>EQUAL 7.0%<8.0%: CPT | Mod: CPTII,S$GLB,, | Performed by: PODIATRIST

## 2022-12-01 PROCEDURE — 3078F PR MOST RECENT DIASTOLIC BLOOD PRESSURE < 80 MM HG: ICD-10-PCS | Mod: CPTII,S$GLB,, | Performed by: PODIATRIST

## 2022-12-01 NOTE — LETTER
December 1, 2022      [id number]  [address]  [facility] [address]  Phone: [phone]       Patient: Billy Rivera   YOB: 1965  Date of Visit: 12/01/2022    To Whom It May Concern:    Sharron Rivera  was at [facility] on 12/01/2022. The patient may return to work on 12/2/22 with restrictions, light duty due to right foot surgery. If you have any questions or concerns, or if I can be of further assistance, please do not hesitate to contact me.    Sincerely,    Mingo Melara DPM

## 2022-12-05 PROCEDURE — 80053 COMPREHEN METABOLIC PANEL: CPT | Performed by: PHYSICIAN ASSISTANT

## 2022-12-05 PROCEDURE — 86140 C-REACTIVE PROTEIN: CPT | Performed by: PHYSICIAN ASSISTANT

## 2022-12-05 PROCEDURE — 85025 COMPLETE CBC W/AUTO DIFF WBC: CPT | Performed by: PHYSICIAN ASSISTANT

## 2022-12-06 ENCOUNTER — LAB VISIT (OUTPATIENT)
Dept: LAB | Facility: HOSPITAL | Age: 57
End: 2022-12-06
Attending: PHYSICIAN ASSISTANT
Payer: COMMERCIAL

## 2022-12-06 DIAGNOSIS — Z79.2 ENCOUNTER FOR LONG-TERM (CURRENT) USE OF ANTIBIOTICS: Primary | ICD-10-CM

## 2022-12-06 DIAGNOSIS — Z45.2 FITTING AND ADJUSTMENT OF VASCULAR CATHETER: ICD-10-CM

## 2022-12-06 DIAGNOSIS — L97.514 ULCER OF TOE OF RIGHT FOOT, WITH NECROSIS OF BONE: ICD-10-CM

## 2022-12-06 DIAGNOSIS — S10.92XA FACE, NECK, AND SCALP EXCEPT EYE, BLISTER, INFECTED: ICD-10-CM

## 2022-12-06 DIAGNOSIS — S00.82XA FACE, NECK, AND SCALP EXCEPT EYE, BLISTER, INFECTED: ICD-10-CM

## 2022-12-06 DIAGNOSIS — L08.9 FACE, NECK, AND SCALP EXCEPT EYE, BLISTER, INFECTED: ICD-10-CM

## 2022-12-06 DIAGNOSIS — S00.02XA FACE, NECK, AND SCALP EXCEPT EYE, BLISTER, INFECTED: ICD-10-CM

## 2022-12-06 LAB
ALBUMIN SERPL BCP-MCNC: 3.5 G/DL (ref 3.5–5.2)
ALP SERPL-CCNC: 101 U/L (ref 55–135)
ALT SERPL W/O P-5'-P-CCNC: 28 U/L (ref 10–44)
ANION GAP SERPL CALC-SCNC: 11 MMOL/L (ref 8–16)
AST SERPL-CCNC: 27 U/L (ref 10–40)
BASOPHILS # BLD AUTO: 0.05 K/UL (ref 0–0.2)
BASOPHILS NFR BLD: 0.6 % (ref 0–1.9)
BILIRUB SERPL-MCNC: 0.3 MG/DL (ref 0.1–1)
BUN SERPL-MCNC: 22 MG/DL (ref 6–20)
CALCIUM SERPL-MCNC: 9.2 MG/DL (ref 8.7–10.5)
CHLORIDE SERPL-SCNC: 106 MMOL/L (ref 95–110)
CO2 SERPL-SCNC: 22 MMOL/L (ref 23–29)
CREAT SERPL-MCNC: 0.8 MG/DL (ref 0.5–1.4)
CRP SERPL-MCNC: 4.2 MG/L (ref 0–8.2)
DIFFERENTIAL METHOD: ABNORMAL
EOSINOPHIL # BLD AUTO: 0.3 K/UL (ref 0–0.5)
EOSINOPHIL NFR BLD: 4 % (ref 0–8)
ERYTHROCYTE [DISTWIDTH] IN BLOOD BY AUTOMATED COUNT: 13.2 % (ref 11.5–14.5)
EST. GFR  (NO RACE VARIABLE): >60 ML/MIN/1.73 M^2
GLUCOSE SERPL-MCNC: 60 MG/DL (ref 70–110)
HCT VFR BLD AUTO: 41.2 % (ref 40–54)
HGB BLD-MCNC: 13.4 G/DL (ref 14–18)
IMM GRANULOCYTES # BLD AUTO: 0.03 K/UL (ref 0–0.04)
IMM GRANULOCYTES NFR BLD AUTO: 0.4 % (ref 0–0.5)
LYMPHOCYTES # BLD AUTO: 1.7 K/UL (ref 1–4.8)
LYMPHOCYTES NFR BLD: 20.9 % (ref 18–48)
MCH RBC QN AUTO: 29.8 PG (ref 27–31)
MCHC RBC AUTO-ENTMCNC: 32.5 G/DL (ref 32–36)
MCV RBC AUTO: 92 FL (ref 82–98)
MONOCYTES # BLD AUTO: 1 K/UL (ref 0.3–1)
MONOCYTES NFR BLD: 11.6 % (ref 4–15)
NEUTROPHILS # BLD AUTO: 5.2 K/UL (ref 1.8–7.7)
NEUTROPHILS NFR BLD: 62.5 % (ref 38–73)
NRBC BLD-RTO: 0 /100 WBC
PLATELET # BLD AUTO: 251 K/UL (ref 150–450)
PMV BLD AUTO: 10.9 FL (ref 9.2–12.9)
POTASSIUM SERPL-SCNC: 4.5 MMOL/L (ref 3.5–5.1)
PROT SERPL-MCNC: 7.3 G/DL (ref 6–8.4)
RBC # BLD AUTO: 4.5 M/UL (ref 4.6–6.2)
SODIUM SERPL-SCNC: 139 MMOL/L (ref 136–145)
WBC # BLD AUTO: 8.27 K/UL (ref 3.9–12.7)

## 2022-12-07 ENCOUNTER — OFFICE VISIT (OUTPATIENT)
Dept: INTERNAL MEDICINE | Facility: CLINIC | Age: 57
End: 2022-12-07
Payer: COMMERCIAL

## 2022-12-07 ENCOUNTER — TELEPHONE (OUTPATIENT)
Dept: INTERNAL MEDICINE | Facility: CLINIC | Age: 57
End: 2022-12-07

## 2022-12-07 VITALS
HEIGHT: 74 IN | HEART RATE: 63 BPM | BODY MASS INDEX: 34.64 KG/M2 | DIASTOLIC BLOOD PRESSURE: 90 MMHG | TEMPERATURE: 97 F | OXYGEN SATURATION: 96 % | SYSTOLIC BLOOD PRESSURE: 110 MMHG | WEIGHT: 269.88 LBS

## 2022-12-07 DIAGNOSIS — Z09 HOSPITAL DISCHARGE FOLLOW-UP: Primary | ICD-10-CM

## 2022-12-07 DIAGNOSIS — Z79.4 DIABETES MELLITUS WITH INSULIN THERAPY: ICD-10-CM

## 2022-12-07 DIAGNOSIS — L08.9 DIABETIC FOOT INFECTION: ICD-10-CM

## 2022-12-07 DIAGNOSIS — E11.9 DIABETES MELLITUS WITH INSULIN THERAPY: ICD-10-CM

## 2022-12-07 DIAGNOSIS — E11.628 DIABETIC FOOT INFECTION: ICD-10-CM

## 2022-12-07 LAB
FINAL PATHOLOGIC DIAGNOSIS: NORMAL
GROSS: NORMAL
Lab: NORMAL
MICROSCOPIC EXAM: NORMAL

## 2022-12-07 PROCEDURE — 90686 FLU VACCINE (QUAD) GREATER THAN OR EQUAL TO 3YO PRESERVATIVE FREE IM: ICD-10-PCS | Mod: S$GLB,,, | Performed by: INTERNAL MEDICINE

## 2022-12-07 PROCEDURE — 3074F SYST BP LT 130 MM HG: CPT | Mod: CPTII,S$GLB,,

## 2022-12-07 PROCEDURE — 99213 PR OFFICE/OUTPT VISIT, EST, LEVL III, 20-29 MIN: ICD-10-PCS | Mod: 25,S$GLB,,

## 2022-12-07 PROCEDURE — 3051F PR MOST RECENT HEMOGLOBIN A1C LEVEL 7.0 - < 8.0%: ICD-10-PCS | Mod: CPTII,S$GLB,,

## 2022-12-07 PROCEDURE — 3008F PR BODY MASS INDEX (BMI) DOCUMENTED: ICD-10-PCS | Mod: CPTII,S$GLB,,

## 2022-12-07 PROCEDURE — 3080F PR MOST RECENT DIASTOLIC BLOOD PRESSURE >= 90 MM HG: ICD-10-PCS | Mod: CPTII,S$GLB,,

## 2022-12-07 PROCEDURE — 90471 IMMUNIZATION ADMIN: CPT | Mod: S$GLB,,, | Performed by: INTERNAL MEDICINE

## 2022-12-07 PROCEDURE — 4010F ACE/ARB THERAPY RXD/TAKEN: CPT | Mod: CPTII,S$GLB,,

## 2022-12-07 PROCEDURE — 4010F PR ACE/ARB THEARPY RXD/TAKEN: ICD-10-PCS | Mod: CPTII,S$GLB,,

## 2022-12-07 PROCEDURE — 90471 FLU VACCINE (QUAD) GREATER THAN OR EQUAL TO 3YO PRESERVATIVE FREE IM: ICD-10-PCS | Mod: S$GLB,,, | Performed by: INTERNAL MEDICINE

## 2022-12-07 PROCEDURE — 3066F PR DOCUMENTATION OF TREATMENT FOR NEPHROPATHY: ICD-10-PCS | Mod: CPTII,S$GLB,,

## 2022-12-07 PROCEDURE — 1159F MED LIST DOCD IN RCRD: CPT | Mod: CPTII,S$GLB,,

## 2022-12-07 PROCEDURE — 1159F PR MEDICATION LIST DOCUMENTED IN MEDICAL RECORD: ICD-10-PCS | Mod: CPTII,S$GLB,,

## 2022-12-07 PROCEDURE — 1111F PR DISCHARGE MEDS RECONCILED W/ CURRENT OUTPATIENT MED LIST: ICD-10-PCS | Mod: CPTII,S$GLB,,

## 2022-12-07 PROCEDURE — 99999 PR PBB SHADOW E&M-EST. PATIENT-LVL IV: CPT | Mod: PBBFAC,,,

## 2022-12-07 PROCEDURE — 3074F PR MOST RECENT SYSTOLIC BLOOD PRESSURE < 130 MM HG: ICD-10-PCS | Mod: CPTII,S$GLB,,

## 2022-12-07 PROCEDURE — 99999 PR PBB SHADOW E&M-EST. PATIENT-LVL IV: ICD-10-PCS | Mod: PBBFAC,,,

## 2022-12-07 PROCEDURE — 3008F BODY MASS INDEX DOCD: CPT | Mod: CPTII,S$GLB,,

## 2022-12-07 PROCEDURE — 3080F DIAST BP >= 90 MM HG: CPT | Mod: CPTII,S$GLB,,

## 2022-12-07 PROCEDURE — 1111F DSCHRG MED/CURRENT MED MERGE: CPT | Mod: CPTII,S$GLB,,

## 2022-12-07 PROCEDURE — 3066F NEPHROPATHY DOC TX: CPT | Mod: CPTII,S$GLB,,

## 2022-12-07 PROCEDURE — 99213 OFFICE O/P EST LOW 20 MIN: CPT | Mod: 25,S$GLB,,

## 2022-12-07 PROCEDURE — 3051F HG A1C>EQUAL 7.0%<8.0%: CPT | Mod: CPTII,S$GLB,,

## 2022-12-07 PROCEDURE — 3060F PR POS MICROALBUMINURIA RESULT DOCUMENTED/REVIEW: ICD-10-PCS | Mod: CPTII,S$GLB,,

## 2022-12-07 PROCEDURE — 90686 IIV4 VACC NO PRSV 0.5 ML IM: CPT | Mod: S$GLB,,, | Performed by: INTERNAL MEDICINE

## 2022-12-07 PROCEDURE — 3060F POS MICROALBUMINURIA REV: CPT | Mod: CPTII,S$GLB,,

## 2022-12-07 NOTE — TELEPHONE ENCOUNTER
----- Message from Francisco Power sent at 12/7/2022  3:21 PM CST -----  Contact: Pt [phone]  Consult    The patient is on his way but will be about 10 mins late.    thankyou

## 2022-12-07 NOTE — PROGRESS NOTES
Clinic Note  12/7/2022      Subjective:       Patient ID:  patient is a 57 y.o. male being seen for a hospital follow up visit.     Chief Complaint: Hospital f/u and Immunizations    HPI  Mr. Rivera is a 58 yo M w/ CAD, T2DM (a1c 7.1 on 11/10) and colon cancer who presents today for a hospital follow-up. Patient was admitted to [facility] 11/22-11/28 for a diabetic foot infection; s/p R 3rd toe amputation on 11/25. Blood cultures were negative but cultures obtained by podiatry clinic on 11/15 (+) staph aureus and pasturella multocida. ID was consulted and patient discharged home w/ total 6 weeks Rocephin (estimated end date 1/7/2022). Has HH set up w/ wound care nurse ~3x/wk. He has a follow up scheduled w/ both ID and podiatry on 12/15. Is feeling well overall. BG at home running 130-140. No issues w/ managing insulin pump.     Review of Systems   Constitutional:  Negative for chills and fever.   HENT:  Negative for congestion and sinus pain.    Eyes:  Negative for blurred vision and pain.   Respiratory:  Negative for cough and shortness of breath.    Cardiovascular:  Negative for chest pain and palpitations.   Gastrointestinal:  Negative for abdominal pain, diarrhea and vomiting.   Musculoskeletal:  Negative for joint pain and myalgias.   Skin:  Negative for itching and rash.   Neurological:  Negative for dizziness and headaches.   Psychiatric/Behavioral:  Negative for depression and suicidal ideas.      Medication List with Changes/Refills   Current Medications    ACETAMINOPHEN (TYLENOL) 500 MG TABLET    Take 1,000 mg by mouth daily as needed for Pain.    ASCORBIC ACID, VITAMIN C, (VITAMIN C) 250 MG TABLET    Take 500 mg by mouth once daily.    ASPIRIN (ECOTRIN) 81 MG EC TABLET    Take 1 tablet (81 mg total) by mouth once daily.    ATORVASTATIN (LIPITOR) 80 MG TABLET    Take 1 tablet (80 mg total) by mouth once daily. Take every night.    BISMUTH SUBSALICYLATE (PEPTO BISMOL) 262 MG/15 ML SUSPENSION    Take 15 mLs by mouth  "daily as needed (diarrhea).    BLOOD SUGAR DIAGNOSTIC (ACCU-CHEK GUIDE TEST STRIPS) STRP    1 each by Misc.(Non-Drug; Combo Route) route 5 (five) times daily.    CARVEDILOL (COREG) 12.5 MG TABLET    Take 0.5 tablets (6.25 mg total) by mouth 2 (two) times daily with meals.    CEFTRIAXONE (ROCEPHIN) 2 G/50 ML PGBK IVPB    Inject 50 mLs (2 g total) into the vein Daily.    DAPAGLIFLOZIN (FARXIGA) 5 MG TAB TABLET    Take 1 tablet (5 mg total) by mouth once daily.    INSULIN (LANTUS SOLOSTAR U-100 INSULIN) GLARGINE 100 UNITS/ML SUBQ PEN    Inject 50 Units into the skin once daily. USE AS BACK UP INSULIN ONLY - EMERGENCY USE IF YOUR ARE OFF OF YOUR INSULIN PUMP    INSULIN LISPRO (HUMALOG U-100 INSULIN) 100 UNIT/ML INJECTION    Inject 15 Units into the skin 3 (three) times daily before meals. Gives via insulin pump only. Do not Directly inject.    LANCETS (ACCU-CHEK FASTCLIX LANCET DRUM) MISC    1 each by Misc.(Non-Drug; Combo Route) route Daily.    LISINOPRIL (PRINIVIL,ZESTRIL) 40 MG TABLET    Take 1 tablet by mouth once daily    METFORMIN (GLUCOPHAGE) 1000 MG TABLET    Take 1 tablet (1,000 mg total) by mouth 2 (two) times daily with meals.    MINIMED 770G INSULIN PUMP MISC    1 each by Misc.(Non-Drug; Combo Route) route continuous. Medically necessary for management of Type 2 diabetes, E11.65    MULTIVIT-MIN/FOLIC/VIT K/LYCOP (MEN'S MULTIVITAMIN ORAL)    Take 1 tablet by mouth once daily.    PEN NEEDLE, DIABETIC 31 GAUGE X 3/16" NDLE    Inject 1 each into the skin 4 (four) times daily.    TRULICITY 1.5 MG/0.5 ML PEN INJECTOR    Inject 1.5 mg into the skin every 7 days.       Patient Active Problem List   Diagnosis    Coronary artery disease involving native coronary artery of native heart without angina pectoris    BDR (background diabetic retinopathy) - Both Eyes    Uncontrolled type II diabetes mellitus    Peripheral arterial disease    Proteinuria    Edema    Hypertension associated with diabetes    Type 2 diabetes " "mellitus with diabetic peripheral angiopathy without gangrene, with long-term current use of insulin    Onychomycosis of multiple toenails with type 2 diabetes mellitus    Type 2 diabetes mellitus 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, with long-term current use of insulin    Hyponatremia    Anemia    Hypomagnesemia    Diabetic foot ulcer associated with type 2 diabetes mellitus    Insomnia    Hx of colon cancer, stage I    Dyslipidemia associated with type 2 diabetes mellitus    Diabetes mellitus with insulin therapy    Obesity, diabetes, and hypertension syndrome    Class 2 severe obesity with body mass index (BMI) of 35 to 39.9 with serious comorbidity    Diabetes mellitus with microalbuminuria    Diabetes mellitus with peripheral circulatory disorder    Diabetic foot infection    Coronary artery disease involving coronary bypass graft of native heart without angina pectoris    Osteomyelitis of left foot    Amputation of toe of left foot    Insulin pump in place    Osteomyelitis of right foot    CAD (coronary artery disease)    Ulcer of right foot with fat layer exposed    Abscess of right foot    Right foot pain    Ulcer of toe, right, with necrosis of bone             Health Maintenance Topics with due status: Not Due       Topic Last Completion Date    TETANUS VACCINE 09/08/2015    Colorectal Cancer Screening 02/17/2017    Pneumococcal Vaccines (Age 0-64) 09/28/2020    PROSTATE-SPECIFIC ANTIGEN 01/26/2022    Diabetes Urine Screening 11/10/2022    Lipid Panel 11/10/2022    Hemoglobin A1c 11/10/2022    High Dose Statin 12/01/2022    Foot Exam 12/01/2022       Objective:      There were no vitals taken for this visit.  Estimated body mass index is 34.48 kg/m² as calculated from the following:    Height as of 12/1/22: 6' 2" (1.88 m).    Weight as of 12/1/22: 121.8 kg (268 lb 8.3 oz).  Physical Exam  Constitutional:       Appearance: Normal appearance. He is obese. "   HENT:      Head: Normocephalic and atraumatic.      Right Ear: External ear normal.      Left Ear: External ear normal.      Mouth/Throat:      Mouth: Mucous membranes are moist.      Pharynx: Oropharynx is clear.      Comments: Poor dentition   Eyes:      Extraocular Movements: Extraocular movements intact.      Conjunctiva/sclera: Conjunctivae normal.   Cardiovascular:      Rate and Rhythm: Normal rate and regular rhythm.      Heart sounds: Normal heart sounds. No murmur heard.  Pulmonary:      Effort: Pulmonary effort is normal.      Breath sounds: Normal breath sounds.   Abdominal:      General: Abdomen is flat. Bowel sounds are normal.      Palpations: Abdomen is soft.   Musculoskeletal:         General: Deformity present.      Comments: Diabetic boots on both feet  R foot w/ dry and clean wrap in place    Skin:     General: Skin is warm and dry.      Capillary Refill: Capillary refill takes less than 2 seconds.      Comments: Chronic LE skin changes    Neurological:      General: No focal deficit present.      Mental Status: He is alert and oriented to person, place, and time.         Assessment and Plan:     Hospital discharge follow-up  Diabetes mellitus with insulin therapy  Diabetic foot infection  - Stable. Home BG at goal. Follow up scheduled w/ ID and podiatry on 12/15. [facility] med rec reviewed. RTC for annual examination in new year.   - Flu shot today     Follow Up:   No follow-ups on file.        Yvonne Lopez M.D.  Internal Medicine PGY-2  [facility]

## 2022-12-12 ENCOUNTER — LAB VISIT (OUTPATIENT)
Dept: LAB | Facility: HOSPITAL | Age: 57
End: 2022-12-12
Attending: PHYSICIAN ASSISTANT
Payer: COMMERCIAL

## 2022-12-12 ENCOUNTER — EXTERNAL HOME HEALTH (OUTPATIENT)
Dept: HOME HEALTH SERVICES | Facility: HOSPITAL | Age: 57
End: 2022-12-12
Payer: COMMERCIAL

## 2022-12-12 DIAGNOSIS — E11.8 DIABETIC COMPLICATION: Primary | ICD-10-CM

## 2022-12-12 LAB
ALBUMIN SERPL BCP-MCNC: 3.5 G/DL (ref 3.5–5.2)
ALP SERPL-CCNC: 94 U/L (ref 55–135)
ALT SERPL W/O P-5'-P-CCNC: 26 U/L (ref 10–44)
ANION GAP SERPL CALC-SCNC: 8 MMOL/L (ref 8–16)
AST SERPL-CCNC: 22 U/L (ref 10–40)
BASOPHILS # BLD AUTO: 0.03 K/UL (ref 0–0.2)
BASOPHILS NFR BLD: 0.4 % (ref 0–1.9)
BILIRUB SERPL-MCNC: 0.4 MG/DL (ref 0.1–1)
BUN SERPL-MCNC: 21 MG/DL (ref 6–20)
CALCIUM SERPL-MCNC: 9.1 MG/DL (ref 8.7–10.5)
CHLORIDE SERPL-SCNC: 104 MMOL/L (ref 95–110)
CO2 SERPL-SCNC: 24 MMOL/L (ref 23–29)
CREAT SERPL-MCNC: 0.8 MG/DL (ref 0.5–1.4)
CRP SERPL-MCNC: 4.5 MG/L (ref 0–8.2)
DIFFERENTIAL METHOD: ABNORMAL
EOSINOPHIL # BLD AUTO: 0.3 K/UL (ref 0–0.5)
EOSINOPHIL NFR BLD: 4 % (ref 0–8)
ERYTHROCYTE [DISTWIDTH] IN BLOOD BY AUTOMATED COUNT: 13.2 % (ref 11.5–14.5)
EST. GFR  (NO RACE VARIABLE): >60 ML/MIN/1.73 M^2
GLUCOSE SERPL-MCNC: 157 MG/DL (ref 70–110)
HCT VFR BLD AUTO: 39.5 % (ref 40–54)
HGB BLD-MCNC: 13.4 G/DL (ref 14–18)
IMM GRANULOCYTES # BLD AUTO: 0.07 K/UL (ref 0–0.04)
IMM GRANULOCYTES NFR BLD AUTO: 0.9 % (ref 0–0.5)
LYMPHOCYTES # BLD AUTO: 1.4 K/UL (ref 1–4.8)
LYMPHOCYTES NFR BLD: 16.5 % (ref 18–48)
MCH RBC QN AUTO: 29.8 PG (ref 27–31)
MCHC RBC AUTO-ENTMCNC: 33.9 G/DL (ref 32–36)
MCV RBC AUTO: 88 FL (ref 82–98)
MONOCYTES # BLD AUTO: 0.7 K/UL (ref 0.3–1)
MONOCYTES NFR BLD: 8.8 % (ref 4–15)
NEUTROPHILS # BLD AUTO: 5.7 K/UL (ref 1.8–7.7)
NEUTROPHILS NFR BLD: 69.4 % (ref 38–73)
NRBC BLD-RTO: 0 /100 WBC
PLATELET # BLD AUTO: 220 K/UL (ref 150–450)
PMV BLD AUTO: 11.3 FL (ref 9.2–12.9)
POTASSIUM SERPL-SCNC: 4.7 MMOL/L (ref 3.5–5.1)
PROT SERPL-MCNC: 7.2 G/DL (ref 6–8.4)
RBC # BLD AUTO: 4.5 M/UL (ref 4.6–6.2)
SODIUM SERPL-SCNC: 136 MMOL/L (ref 136–145)
WBC # BLD AUTO: 8.19 K/UL (ref 3.9–12.7)

## 2022-12-12 PROCEDURE — 86140 C-REACTIVE PROTEIN: CPT | Performed by: PHYSICIAN ASSISTANT

## 2022-12-12 PROCEDURE — 85025 COMPLETE CBC W/AUTO DIFF WBC: CPT | Performed by: PHYSICIAN ASSISTANT

## 2022-12-12 PROCEDURE — 80053 COMPREHEN METABOLIC PANEL: CPT | Performed by: PHYSICIAN ASSISTANT

## 2022-12-15 ENCOUNTER — TELEPHONE (OUTPATIENT)
Dept: DIABETES | Facility: CLINIC | Age: 57
End: 2022-12-15
Payer: COMMERCIAL

## 2022-12-15 ENCOUNTER — OFFICE VISIT (OUTPATIENT)
Dept: PODIATRY | Facility: CLINIC | Age: 57
End: 2022-12-15
Payer: COMMERCIAL

## 2022-12-15 ENCOUNTER — PATIENT MESSAGE (OUTPATIENT)
Dept: DIABETES | Facility: CLINIC | Age: 57
End: 2022-12-15
Payer: COMMERCIAL

## 2022-12-15 ENCOUNTER — PATIENT MESSAGE (OUTPATIENT)
Dept: INTERNAL MEDICINE | Facility: CLINIC | Age: 57
End: 2022-12-15
Payer: COMMERCIAL

## 2022-12-15 ENCOUNTER — TELEPHONE (OUTPATIENT)
Dept: INTERNAL MEDICINE | Facility: CLINIC | Age: 57
End: 2022-12-15
Payer: COMMERCIAL

## 2022-12-15 VITALS
HEART RATE: 61 BPM | SYSTOLIC BLOOD PRESSURE: 144 MMHG | DIASTOLIC BLOOD PRESSURE: 73 MMHG | WEIGHT: 271.81 LBS | BODY MASS INDEX: 34.88 KG/M2 | HEIGHT: 74 IN

## 2022-12-15 DIAGNOSIS — Z79.4 TYPE 2 DIABETES MELLITUS WITH DIABETIC PERIPHERAL ANGIOPATHY WITHOUT GANGRENE, WITH LONG-TERM CURRENT USE OF INSULIN: Primary | Chronic | ICD-10-CM

## 2022-12-15 DIAGNOSIS — M86.271 SUBACUTE OSTEOMYELITIS OF RIGHT FOOT: ICD-10-CM

## 2022-12-15 DIAGNOSIS — E11.51 TYPE 2 DIABETES MELLITUS WITH DIABETIC PERIPHERAL ANGIOPATHY WITHOUT GANGRENE, WITH LONG-TERM CURRENT USE OF INSULIN: Primary | Chronic | ICD-10-CM

## 2022-12-15 DIAGNOSIS — L97.512 FOOT ULCER, RIGHT, WITH FAT LAYER EXPOSED: Primary | ICD-10-CM

## 2022-12-15 PROCEDURE — 3066F NEPHROPATHY DOC TX: CPT | Mod: CPTII,S$GLB,, | Performed by: PODIATRIST

## 2022-12-15 PROCEDURE — 3077F SYST BP >= 140 MM HG: CPT | Mod: CPTII,S$GLB,, | Performed by: PODIATRIST

## 2022-12-15 PROCEDURE — 4010F ACE/ARB THERAPY RXD/TAKEN: CPT | Mod: CPTII,S$GLB,, | Performed by: PODIATRIST

## 2022-12-15 PROCEDURE — 3077F PR MOST RECENT SYSTOLIC BLOOD PRESSURE >= 140 MM HG: ICD-10-PCS | Mod: CPTII,S$GLB,, | Performed by: PODIATRIST

## 2022-12-15 PROCEDURE — 3066F PR DOCUMENTATION OF TREATMENT FOR NEPHROPATHY: ICD-10-PCS | Mod: CPTII,S$GLB,, | Performed by: PODIATRIST

## 2022-12-15 PROCEDURE — 3078F PR MOST RECENT DIASTOLIC BLOOD PRESSURE < 80 MM HG: ICD-10-PCS | Mod: CPTII,S$GLB,, | Performed by: PODIATRIST

## 2022-12-15 PROCEDURE — 3008F BODY MASS INDEX DOCD: CPT | Mod: CPTII,S$GLB,, | Performed by: PODIATRIST

## 2022-12-15 PROCEDURE — 1111F PR DISCHARGE MEDS RECONCILED W/ CURRENT OUTPATIENT MED LIST: ICD-10-PCS | Mod: CPTII,S$GLB,, | Performed by: PODIATRIST

## 2022-12-15 PROCEDURE — 11042 DBRDMT SUBQ TIS 1ST 20SQCM/<: CPT | Mod: 79,RT,S$GLB, | Performed by: PODIATRIST

## 2022-12-15 PROCEDURE — 3051F HG A1C>EQUAL 7.0%<8.0%: CPT | Mod: CPTII,S$GLB,, | Performed by: PODIATRIST

## 2022-12-15 PROCEDURE — 99999 PR PBB SHADOW E&M-EST. PATIENT-LVL IV: CPT | Mod: PBBFAC,,, | Performed by: PODIATRIST

## 2022-12-15 PROCEDURE — 1160F PR REVIEW ALL MEDS BY PRESCRIBER/CLIN PHARMACIST DOCUMENTED: ICD-10-PCS | Mod: CPTII,S$GLB,, | Performed by: PODIATRIST

## 2022-12-15 PROCEDURE — 99213 PR OFFICE/OUTPT VISIT, EST, LEVL III, 20-29 MIN: ICD-10-PCS | Mod: 25,S$GLB,, | Performed by: PODIATRIST

## 2022-12-15 PROCEDURE — 4010F PR ACE/ARB THEARPY RXD/TAKEN: ICD-10-PCS | Mod: CPTII,S$GLB,, | Performed by: PODIATRIST

## 2022-12-15 PROCEDURE — 11042 PR DEBRIDEMENT, SKIN, SUB-Q TISSUE,=<20 SQ CM: ICD-10-PCS | Mod: 79,RT,S$GLB, | Performed by: PODIATRIST

## 2022-12-15 PROCEDURE — 1159F PR MEDICATION LIST DOCUMENTED IN MEDICAL RECORD: ICD-10-PCS | Mod: CPTII,S$GLB,, | Performed by: PODIATRIST

## 2022-12-15 PROCEDURE — 3060F PR POS MICROALBUMINURIA RESULT DOCUMENTED/REVIEW: ICD-10-PCS | Mod: CPTII,S$GLB,, | Performed by: PODIATRIST

## 2022-12-15 PROCEDURE — 3060F POS MICROALBUMINURIA REV: CPT | Mod: CPTII,S$GLB,, | Performed by: PODIATRIST

## 2022-12-15 PROCEDURE — 99999 PR PBB SHADOW E&M-EST. PATIENT-LVL IV: ICD-10-PCS | Mod: PBBFAC,,, | Performed by: PODIATRIST

## 2022-12-15 PROCEDURE — 1160F RVW MEDS BY RX/DR IN RCRD: CPT | Mod: CPTII,S$GLB,, | Performed by: PODIATRIST

## 2022-12-15 PROCEDURE — 1159F MED LIST DOCD IN RCRD: CPT | Mod: CPTII,S$GLB,, | Performed by: PODIATRIST

## 2022-12-15 PROCEDURE — 1111F DSCHRG MED/CURRENT MED MERGE: CPT | Mod: CPTII,S$GLB,, | Performed by: PODIATRIST

## 2022-12-15 PROCEDURE — 3078F DIAST BP <80 MM HG: CPT | Mod: CPTII,S$GLB,, | Performed by: PODIATRIST

## 2022-12-15 PROCEDURE — 99213 OFFICE O/P EST LOW 20 MIN: CPT | Mod: 25,S$GLB,, | Performed by: PODIATRIST

## 2022-12-15 PROCEDURE — 3008F PR BODY MASS INDEX (BMI) DOCUMENTED: ICD-10-PCS | Mod: CPTII,S$GLB,, | Performed by: PODIATRIST

## 2022-12-15 PROCEDURE — 3051F PR MOST RECENT HEMOGLOBIN A1C LEVEL 7.0 - < 8.0%: ICD-10-PCS | Mod: CPTII,S$GLB,, | Performed by: PODIATRIST

## 2022-12-15 RX ORDER — INSULIN ASPART 100 [IU]/ML
INJECTION, SOLUTION INTRAVENOUS; SUBCUTANEOUS
Qty: 30 ML | Refills: 3 | Status: SHIPPED | OUTPATIENT
Start: 2022-12-15 | End: 2023-04-06 | Stop reason: SDUPTHER

## 2022-12-15 NOTE — TELEPHONE ENCOUNTER
----- Message from Nilton Randolph MA sent at 12/15/2022  1:50 PM CST -----  Contact: [phone]  Pt is returning missed phone call in regards to diabetes medical supplies needed. Pt can be reached at [phone]. Pt would like a voicemail left in regards to this if no answer.

## 2022-12-15 NOTE — TELEPHONE ENCOUNTER
----- Message from Patti Jimenez sent at 12/15/2022 10:35 AM CST -----  Regarding: advice  Contact: patient [phone]  Patient was tnotified by [facility] that insulin for pump was discontinued.  Please cll and advise.

## 2022-12-16 ENCOUNTER — DOCUMENTATION ONLY (OUTPATIENT)
Dept: INFECTIOUS DISEASES | Facility: CLINIC | Age: 57
End: 2022-12-16
Payer: COMMERCIAL

## 2022-12-16 LAB — FUNGUS SPEC CULT: NORMAL

## 2022-12-16 NOTE — PROGRESS NOTES
Infectious Disease - Outpatient lab follow up     Dx: osteomyelitis   Antibiotics:  Ceftriaxone x 6 weeks  Estimated End Date: 1/6/23    Patient doing well. He saw podiatry this morning. No complaints today. CRP normalized. ID follow up scheduled for 1/6/23.    WBC   Date Value Ref Range Status   12/12/2022 8.19 3.90 - 12.70 K/uL Final   12/05/2022 8.27 3.90 - 12.70 K/uL Final   11/28/2022 8.55 3.90 - 12.70 K/uL Final     Hemoglobin   Date Value Ref Range Status   12/12/2022 13.4 (L) 14.0 - 18.0 g/dL Final   12/05/2022 13.4 (L) 14.0 - 18.0 g/dL Final   11/28/2022 14.3 14.0 - 18.0 g/dL Final     Hematocrit   Date Value Ref Range Status   12/12/2022 39.5 (L) 40.0 - 54.0 % Final   12/05/2022 41.2 40.0 - 54.0 % Final   11/28/2022 41.5 40.0 - 54.0 % Final     Platelets   Date Value Ref Range Status   12/12/2022 220 150 - 450 K/uL Final   12/05/2022 251 150 - 450 K/uL Final   11/28/2022 257 150 - 450 K/uL Final     Sodium   Date Value Ref Range Status   12/12/2022 136 136 - 145 mmol/L Final   12/05/2022 139 136 - 145 mmol/L Final   11/28/2022 133 (L) 136 - 145 mmol/L Final     Potassium   Date Value Ref Range Status   12/12/2022 4.7 3.5 - 5.1 mmol/L Final   12/05/2022 4.5 3.5 - 5.1 mmol/L Final   11/28/2022 4.8 3.5 - 5.1 mmol/L Final     Chloride   Date Value Ref Range Status   12/12/2022 104 95 - 110 mmol/L Final   12/05/2022 106 95 - 110 mmol/L Final   11/28/2022 102 95 - 110 mmol/L Final     CO2   Date Value Ref Range Status   12/12/2022 24 23 - 29 mmol/L Final   12/05/2022 22 (L) 23 - 29 mmol/L Final   11/28/2022 23 23 - 29 mmol/L Final     BUN   Date Value Ref Range Status   12/12/2022 21 (H) 6 - 20 mg/dL Final   12/05/2022 22 (H) 6 - 20 mg/dL Final   11/28/2022 16 6 - 20 mg/dL Final     Creatinine   Date Value Ref Range Status   12/12/2022 0.8 0.5 - 1.4 mg/dL Final   12/05/2022 0.8 0.5 - 1.4 mg/dL Final   11/28/2022 0.9 0.5 - 1.4 mg/dL Final     Calcium   Date Value Ref Range Status   12/12/2022 9.1 8.7 - 10.5 mg/dL  Final   12/05/2022 9.2 8.7 - 10.5 mg/dL Final   11/28/2022 9.1 8.7 - 10.5 mg/dL Final     Total Protein   Date Value Ref Range Status   12/12/2022 7.2 6.0 - 8.4 g/dL Final   12/05/2022 7.3 6.0 - 8.4 g/dL Final   11/28/2022 7.1 6.0 - 8.4 g/dL Final     Total Bilirubin   Date Value Ref Range Status   12/12/2022 0.4 0.1 - 1.0 mg/dL Final     Comment:     For infants and newborns, interpretation of results should be based  on gestational age, weight and in agreement with clinical  observations.    Premature Infant recommended reference ranges:  Up to 24 hours.............<8.0 mg/dL  Up to 48 hours............<12.0 mg/dL  3-5 days..................<15.0 mg/dL  6-29 days.................<15.0 mg/dL     12/05/2022 0.3 0.1 - 1.0 mg/dL Final     Comment:     For infants and newborns, interpretation of results should be based  on gestational age, weight and in agreement with clinical  observations.    Premature Infant recommended reference ranges:  Up to 24 hours.............<8.0 mg/dL  Up to 48 hours............<12.0 mg/dL  3-5 days..................<15.0 mg/dL  6-29 days.................<15.0 mg/dL     11/28/2022 0.7 0.1 - 1.0 mg/dL Final     Comment:     For infants and newborns, interpretation of results should be based  on gestational age, weight and in agreement with clinical  observations.    Premature Infant recommended reference ranges:  Up to 24 hours.............<8.0 mg/dL  Up to 48 hours............<12.0 mg/dL  3-5 days..................<15.0 mg/dL  6-29 days.................<15.0 mg/dL       Alkaline Phosphatase   Date Value Ref Range Status   12/12/2022 94 55 - 135 U/L Final   12/05/2022 101 55 - 135 U/L Final   11/28/2022 97 55 - 135 U/L Final     ALT   Date Value Ref Range Status   12/12/2022 26 10 - 44 U/L Final   12/05/2022 28 10 - 44 U/L Final   11/28/2022 54 (H) 10 - 44 U/L Final     AST   Date Value Ref Range Status   12/12/2022 22 10 - 40 U/L Final   12/05/2022 27 10 - 40 U/L Final   11/28/2022 23 10 -  40 U/L Final     Sed Rate   Date Value Ref Range Status   11/22/2022 56 (H) 0 - 23 mm/Hr Final   06/08/2022 78 (H) 0 - 23 mm/Hr Final   05/25/2021 46 (H) 0 - 23 mm/Hr Final     CRP   Date Value Ref Range Status   12/12/2022 4.5 0.0 - 8.2 mg/L Final   12/05/2022 4.2 0.0 - 8.2 mg/L Final   11/28/2022 11.8 (H) 0.0 - 8.2 mg/L Final

## 2022-12-18 NOTE — PROGRESS NOTES
Subjective:      Patient ID: Billy Rivera is a 57 y.o. male.    Chief Complaint: Post-op Evaluation (Right Ft 3rd toe 0/10) and Diabetes Mellitus      Billy is a 57 y.o. male who presents to the clinic for evaluation and treatment of high risk feet. Billy has a past medical history of Allergy, CAD (coronary artery disease), native coronary artery (6/25/2013), Cancer, COVID-19 virus detected (9/1/2020), Diabetes mellitus, Diabetes mellitus, type 2, Disorder of kidney and ureter, Heart attack (04/2012), colon cancer, stage I, Hyperlipidemia, Hypertension, Muscular pain, NSTEMI May 2013 - peak troponin 0.22 (5/22/2013), MICHAELA (obstructive sleep apnea), and Retinopathy due to secondary diabetes. The patient's chief complaint is foot ulcer both feet.   The right foot has worsened.    PCP: BRAD Baker MD    Date Last Seen by PCP: 3/23/2022    Current shoe gear:  Affected Foot: DARCO Shoe right foot     Affected Foot: Tennis shoe left foot    History of Trauma: negative  Sign of Infection: none    Hemoglobin A1C   Date Value Ref Range Status   11/10/2022 7.1 (H) 4.0 - 5.6 % Final     Comment:     ADA Screening Guidelines:  5.7-6.4%  Consistent with prediabetes  >or=6.5%  Consistent with diabetes    High levels of fetal hemoglobin interfere with the HbA1C  assay. Heterozygous hemoglobin variants (HbS, HgC, etc)do  not significantly interfere with this assay.   However, presence of multiple variants may affect accuracy.     08/09/2022 8.0 (H) 4.0 - 5.6 % Final     Comment:     ADA Screening Guidelines:  5.7-6.4%  Consistent with prediabetes  >or=6.5%  Consistent with diabetes    High levels of fetal hemoglobin interfere with the HbA1C  assay. Heterozygous hemoglobin variants (HbS, HgC, etc)do  not significantly interfere with this assay.   However, presence of multiple variants may affect accuracy.     06/08/2022 10.0 (H) 4.0 - 5.6 % Final     Comment:     ADA Screening Guidelines:  5.7-6.4%  Consistent  with prediabetes  >or=6.5%  Consistent with diabetes    High levels of fetal hemoglobin interfere with the HbA1C  assay. Heterozygous hemoglobin variants (HbS, HgC, etc)do  not significantly interfere with this assay.   However, presence of multiple variants may affect accuracy.         Review of Systems   Constitutional: Negative for chills, decreased appetite, fever, malaise/fatigue and night sweats.   HENT:  Negative for congestion, hearing loss, nosebleeds and tinnitus.    Eyes:  Negative for double vision, pain, photophobia and visual disturbance.   Cardiovascular:  Negative for chest pain, claudication, cyanosis and leg swelling.   Respiratory:  Negative for cough, hemoptysis, shortness of breath and wheezing.    Endocrine: Negative for cold intolerance and heat intolerance.   Hematologic/Lymphatic: Negative for adenopathy and bleeding problem.   Skin:  Positive for poor wound healing. Negative for color change, dry skin, itching, nail changes, rash and suspicious lesions.   Musculoskeletal:  Negative for arthritis, falls, gout, joint pain, myalgias and stiffness.   Gastrointestinal:  Negative for abdominal pain, jaundice, nausea and vomiting.   Genitourinary:  Negative for dysuria, frequency and hematuria.   Neurological:  Positive for numbness and sensory change. Negative for difficulty with concentration, loss of balance and paresthesias.   Psychiatric/Behavioral:  Negative for altered mental status, hallucinations and suicidal ideas. The patient is not nervous/anxious.    Allergic/Immunologic: Negative for environmental allergies and persistent infections.         Objective:        Physical Exam  Constitutional:       General: He is not in acute distress.     Appearance: Normal appearance.   Cardiovascular:      Pulses:           Dorsalis pedis pulses are 2+ on the right side and 2+ on the left side.        Posterior tibial pulses are 2+ on the right side and 2+ on the left side.      Comments: CFT< 3 secs  "all toes bilateral foot, skin temp warm to cool bilateral foot, no hair growth bilateral lower extremity, no edema to bilateral lower extremities    Musculoskeletal:         General: No swelling.      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     5/5 muscle strength with DF/PF/Inv/Ev bilateral.    Inspection and palpation of the joints and bones reveal no crepitus or joint effusion. No tenderness upon palpation.  Angle and base of gait are normal.    Skin:     General: Skin is warm and dry.      Capillary Refill: Capillary refill takes 2 to 3 seconds.      Findings: No erythema.      Comments: Ulceration location:  Ulceration right 3rd digit has increased in size.  Increased edema and erythema with malodor apparent.   0.7 cm x 0 point 3 cm x 0.3 cm post debridement.   Neurological:      Mental Status: He is alert and oriented to person, place, and time.      Sensory: Sensory deficit present.      Motor: No weakness.      Comments: Diminished/loss of protective sensation all toes bilateral to 10 gram monofilament.   Psychiatric:         Mood and Affect: Mood normal.         Thought Content: Thought content normal.             Assessment:       Encounter Diagnoses   Name Primary?    Type II diabetes mellitus with neurological manifestations Yes    Foot ulcer, right, with fat layer exposed            Plan:       Billy was seen today for post-op evaluation and diabetes mellitus.    Diagnoses and all orders for this visit:    Type II diabetes mellitus with neurological manifestations    Foot ulcer, right, with fat layer exposed    Wound Debridement    Performed by: Mingo Melara DPM   Authorized by: Patient    Time out: Immediately prior to procedure a "time out" was called to verify the correct patient, procedure, equipment, support staff and site/side marked as required.   Consent Done?:  Yes (Verbal)  Local anesthesia used?: No       Wound Details:    Location:  " Right 3rd toe     Type of Debridement:  Excisional       Length (cm):  0.7       Width (cm):  0.3       Depth (cm):  0.3       Percent Debrided (%):  100             Depth of debridement:  Subcutaneous tissue    Tissue debrided:  Dermis, Epidermis and Subcutaneous    Devitalized tissue debrided:  Biofilm, Callus and Necrotic/Eschar    Instruments:  Blade, Curette and Nippers     Bleeding:  Minimal  Hemostasis Achieved: Yes    Method Used:  Pressure  Patient tolerance:  Patient tolerated the procedure well with no immediate complications    I counseled the patient on his conditions, their implications and medical management.  The nature of the condition, options for management, as well as potential risks and complications were discussed in detail with patient. Patient was amenable to my recommendations and left my office fully informed and will follow up as instructed or sooner if necessary.    F/U 2 weeks.

## 2022-12-19 ENCOUNTER — LAB VISIT (OUTPATIENT)
Dept: LAB | Facility: HOSPITAL | Age: 57
End: 2022-12-19
Attending: PHYSICIAN ASSISTANT
Payer: COMMERCIAL

## 2022-12-19 DIAGNOSIS — E11.621 DIABETIC FOOT ULCER WITH OSTEOMYELITIS: Primary | ICD-10-CM

## 2022-12-19 DIAGNOSIS — L97.509 DIABETIC FOOT ULCER WITH OSTEOMYELITIS: Primary | ICD-10-CM

## 2022-12-19 DIAGNOSIS — E11.69 DIABETIC FOOT ULCER WITH OSTEOMYELITIS: Primary | ICD-10-CM

## 2022-12-19 DIAGNOSIS — M86.9 DIABETIC FOOT ULCER WITH OSTEOMYELITIS: Primary | ICD-10-CM

## 2022-12-19 PROCEDURE — 80053 COMPREHEN METABOLIC PANEL: CPT | Performed by: PHYSICIAN ASSISTANT

## 2022-12-19 PROCEDURE — 82550 ASSAY OF CK (CPK): CPT | Performed by: PHYSICIAN ASSISTANT

## 2022-12-19 PROCEDURE — 85025 COMPLETE CBC W/AUTO DIFF WBC: CPT | Performed by: PHYSICIAN ASSISTANT

## 2022-12-20 LAB
ALBUMIN SERPL BCP-MCNC: 3.6 G/DL (ref 3.5–5.2)
ALP SERPL-CCNC: 106 U/L (ref 55–135)
ALT SERPL W/O P-5'-P-CCNC: 28 U/L (ref 10–44)
ANION GAP SERPL CALC-SCNC: 10 MMOL/L (ref 8–16)
AST SERPL-CCNC: 23 U/L (ref 10–40)
BASOPHILS # BLD AUTO: 0.06 K/UL (ref 0–0.2)
BASOPHILS NFR BLD: 0.7 % (ref 0–1.9)
BILIRUB SERPL-MCNC: 0.3 MG/DL (ref 0.1–1)
BUN SERPL-MCNC: 20 MG/DL (ref 6–20)
CALCIUM SERPL-MCNC: 8.9 MG/DL (ref 8.7–10.5)
CHLORIDE SERPL-SCNC: 103 MMOL/L (ref 95–110)
CK SERPL-CCNC: 87 U/L (ref 20–200)
CO2 SERPL-SCNC: 22 MMOL/L (ref 23–29)
CREAT SERPL-MCNC: 0.9 MG/DL (ref 0.5–1.4)
DIFFERENTIAL METHOD: ABNORMAL
EOSINOPHIL # BLD AUTO: 0.3 K/UL (ref 0–0.5)
EOSINOPHIL NFR BLD: 3.8 % (ref 0–8)
ERYTHROCYTE [DISTWIDTH] IN BLOOD BY AUTOMATED COUNT: 13.6 % (ref 11.5–14.5)
EST. GFR  (NO RACE VARIABLE): >60 ML/MIN/1.73 M^2
GLUCOSE SERPL-MCNC: 179 MG/DL (ref 70–110)
HCT VFR BLD AUTO: 43.4 % (ref 40–54)
HGB BLD-MCNC: 14.1 G/DL (ref 14–18)
IMM GRANULOCYTES # BLD AUTO: 0.07 K/UL (ref 0–0.04)
IMM GRANULOCYTES NFR BLD AUTO: 0.8 % (ref 0–0.5)
LYMPHOCYTES # BLD AUTO: 1.6 K/UL (ref 1–4.8)
LYMPHOCYTES NFR BLD: 18.3 % (ref 18–48)
MCH RBC QN AUTO: 29.3 PG (ref 27–31)
MCHC RBC AUTO-ENTMCNC: 32.5 G/DL (ref 32–36)
MCV RBC AUTO: 90 FL (ref 82–98)
MONOCYTES # BLD AUTO: 0.8 K/UL (ref 0.3–1)
MONOCYTES NFR BLD: 9.2 % (ref 4–15)
NEUTROPHILS # BLD AUTO: 5.8 K/UL (ref 1.8–7.7)
NEUTROPHILS NFR BLD: 67.2 % (ref 38–73)
NRBC BLD-RTO: 0 /100 WBC
PLATELET # BLD AUTO: 235 K/UL (ref 150–450)
PMV BLD AUTO: 11.4 FL (ref 9.2–12.9)
POTASSIUM SERPL-SCNC: 4.6 MMOL/L (ref 3.5–5.1)
PROT SERPL-MCNC: 7.4 G/DL (ref 6–8.4)
RBC # BLD AUTO: 4.82 M/UL (ref 4.6–6.2)
SODIUM SERPL-SCNC: 135 MMOL/L (ref 136–145)
WBC # BLD AUTO: 8.62 K/UL (ref 3.9–12.7)

## 2022-12-23 LAB
FINAL PATHOLOGIC DIAGNOSIS: NORMAL
GROSS: NORMAL
Lab: NORMAL

## 2022-12-26 NOTE — PROGRESS NOTES
Subjective:      Patient ID: Billy Rivera is a 57 y.o. male.    Chief Complaint: Follow-up (Post op)      Billy is a 57 y.o. male who presents to the clinic for evaluation and treatment of high risk feet. Billy has a past medical history of Allergy, CAD (coronary artery disease), native coronary artery (6/25/2013), Cancer, COVID-19 virus detected (9/1/2020), Diabetes mellitus, Diabetes mellitus, type 2, Disorder of kidney and ureter, Heart attack (04/2012), colon cancer, stage I, Hyperlipidemia, Hypertension, Muscular pain, NSTEMI May 2013 - peak troponin 0.22 (5/22/2013), MICHAELA (obstructive sleep apnea), and Retinopathy due to secondary diabetes. The patient's chief complaint is foot ulcer both feet.   Follow-up after recent hospitalization.    PCP: BRAD Baker MD    Date Last Seen by PCP: 3/23/2022    Current shoe gear:  Affected Foot: DARCO Shoe right foot     Affected Foot: Tennis shoe left foot    History of Trauma: negative  Sign of Infection: none    Hemoglobin A1C   Date Value Ref Range Status   11/10/2022 7.1 (H) 4.0 - 5.6 % Final     Comment:     ADA Screening Guidelines:  5.7-6.4%  Consistent with prediabetes  >or=6.5%  Consistent with diabetes    High levels of fetal hemoglobin interfere with the HbA1C  assay. Heterozygous hemoglobin variants (HbS, HgC, etc)do  not significantly interfere with this assay.   However, presence of multiple variants may affect accuracy.     08/09/2022 8.0 (H) 4.0 - 5.6 % Final     Comment:     ADA Screening Guidelines:  5.7-6.4%  Consistent with prediabetes  >or=6.5%  Consistent with diabetes    High levels of fetal hemoglobin interfere with the HbA1C  assay. Heterozygous hemoglobin variants (HbS, HgC, etc)do  not significantly interfere with this assay.   However, presence of multiple variants may affect accuracy.     06/08/2022 10.0 (H) 4.0 - 5.6 % Final     Comment:     ADA Screening Guidelines:  5.7-6.4%  Consistent with prediabetes  >or=6.5%   Consistent with diabetes    High levels of fetal hemoglobin interfere with the HbA1C  assay. Heterozygous hemoglobin variants (HbS, HgC, etc)do  not significantly interfere with this assay.   However, presence of multiple variants may affect accuracy.         Review of Systems   Constitutional: Negative for chills, decreased appetite, fever, malaise/fatigue and night sweats.   HENT:  Negative for congestion, hearing loss, nosebleeds and tinnitus.    Eyes:  Negative for double vision, pain, photophobia and visual disturbance.   Cardiovascular:  Negative for chest pain, claudication, cyanosis and leg swelling.   Respiratory:  Negative for cough, hemoptysis, shortness of breath and wheezing.    Endocrine: Negative for cold intolerance and heat intolerance.   Hematologic/Lymphatic: Negative for adenopathy and bleeding problem.   Skin:  Positive for poor wound healing. Negative for color change, dry skin, itching, nail changes, rash and suspicious lesions.   Musculoskeletal:  Negative for arthritis, falls, gout, joint pain, myalgias and stiffness.   Gastrointestinal:  Negative for abdominal pain, jaundice, nausea and vomiting.   Genitourinary:  Negative for dysuria, frequency and hematuria.   Neurological:  Positive for numbness and sensory change. Negative for difficulty with concentration, loss of balance and paresthesias.   Psychiatric/Behavioral:  Negative for altered mental status, hallucinations and suicidal ideas. The patient is not nervous/anxious.    Allergic/Immunologic: Negative for environmental allergies and persistent infections.         Objective:        Physical Exam  Constitutional:       General: He is not in acute distress.     Appearance: Normal appearance.   Cardiovascular:      Pulses:           Dorsalis pedis pulses are 2+ on the right side and 2+ on the left side.        Posterior tibial pulses are 2+ on the right side and 2+ on the left side.      Comments: CFT< 3 secs all toes bilateral foot,  "skin temp warm to cool bilateral foot, no hair growth bilateral lower extremity, no edema to bilateral lower extremities    Musculoskeletal:         General: No swelling.      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     5/5 muscle strength with DF/PF/Inv/Ev bilateral.    Inspection and palpation of the joints and bones reveal no crepitus or joint effusion. No tenderness upon palpation.  Angle and base of gait are normal.    Skin:     General: Skin is warm and dry.      Capillary Refill: Capillary refill takes 2 to 3 seconds.      Findings: No erythema.      Comments: Ulceration location:  Ulceration right 3rd digit 0.5 cm in diameter by 0.2 cm in depth.  Post debridement.   Neurological:      Mental Status: He is alert and oriented to person, place, and time.      Sensory: Sensory deficit present.      Motor: No weakness.      Comments: Diminished/loss of protective sensation all toes bilateral to 10 gram monofilament.   Psychiatric:         Mood and Affect: Mood normal.         Thought Content: Thought content normal.             Assessment:       Encounter Diagnoses   Name Primary?    Subacute osteomyelitis of right foot     Foot ulcer, right, with fat layer exposed Yes           Plan:       Billy was seen today for follow-up.    Diagnoses and all orders for this visit:    Foot ulcer, right, with fat layer exposed    Subacute osteomyelitis of right foot  -     Ambulatory referral/consult to Podiatry      Wound Debridement    Performed by: Mingo Melara DPM   Authorized by: Patient    Time out: Immediately prior to procedure a "time out" was called to verify the correct patient, procedure, equipment, support staff and site/side marked as required.   Consent Done?:  Yes (Verbal)  Local anesthesia used?: No       Wound Details:    Location:  right foot     Type of Debridement:  Excisional       Length (cm):  0.3       Width (cm):  0.3       Depth (cm):  0.2       " Percent Debrided (%):  100             Depth of debridement:  Subcutaneous tissue    Tissue debrided:  Dermis, Epidermis and Subcutaneous    Devitalized tissue debrided:  Biofilm, Callus and Necrotic/Eschar    Instruments:  Blade, Curette and Nippers     Bleeding:  Minimal  Hemostasis Achieved: Yes    Method Used:  Pressure  Patient tolerance:  Patient tolerated the procedure well with no immediate complications    I counseled the patient on his conditions, their implications and medical management.  The nature of the condition, options for management, as well as potential risks and complications were discussed in detail with patient. Patient was amenable to my recommendations and left my office fully informed and will follow up as instructed or sooner if necessary.    F/U 2 weeks.

## 2022-12-27 ENCOUNTER — LAB VISIT (OUTPATIENT)
Dept: LAB | Facility: HOSPITAL | Age: 57
End: 2022-12-27
Attending: INTERNAL MEDICINE
Payer: COMMERCIAL

## 2022-12-27 ENCOUNTER — PATIENT OUTREACH (OUTPATIENT)
Dept: ADMINISTRATIVE | Facility: HOSPITAL | Age: 57
End: 2022-12-27
Payer: COMMERCIAL

## 2022-12-27 ENCOUNTER — PATIENT MESSAGE (OUTPATIENT)
Dept: ADMINISTRATIVE | Facility: HOSPITAL | Age: 57
End: 2022-12-27
Payer: COMMERCIAL

## 2022-12-27 DIAGNOSIS — E11.69 DIABETIC FOOT ULCER WITH OSTEOMYELITIS: Primary | ICD-10-CM

## 2022-12-27 DIAGNOSIS — M86.9 DIABETIC FOOT ULCER WITH OSTEOMYELITIS: Primary | ICD-10-CM

## 2022-12-27 DIAGNOSIS — E11.621 DIABETIC FOOT ULCER WITH OSTEOMYELITIS: Primary | ICD-10-CM

## 2022-12-27 DIAGNOSIS — L97.509 DIABETIC FOOT ULCER WITH OSTEOMYELITIS: Primary | ICD-10-CM

## 2022-12-27 LAB
ALBUMIN SERPL BCP-MCNC: 3.4 G/DL (ref 3.5–5.2)
ALP SERPL-CCNC: 107 U/L (ref 55–135)
ALT SERPL W/O P-5'-P-CCNC: 41 U/L (ref 10–44)
ANION GAP SERPL CALC-SCNC: 10 MMOL/L (ref 8–16)
AST SERPL-CCNC: 26 U/L (ref 10–40)
BASOPHILS # BLD AUTO: 0.03 K/UL (ref 0–0.2)
BASOPHILS NFR BLD: 0.4 % (ref 0–1.9)
BILIRUB SERPL-MCNC: 0.5 MG/DL (ref 0.1–1)
BUN SERPL-MCNC: 16 MG/DL (ref 6–20)
CALCIUM SERPL-MCNC: 8.7 MG/DL (ref 8.7–10.5)
CHLORIDE SERPL-SCNC: 105 MMOL/L (ref 95–110)
CO2 SERPL-SCNC: 24 MMOL/L (ref 23–29)
CREAT SERPL-MCNC: 0.8 MG/DL (ref 0.5–1.4)
CRP SERPL-MCNC: 4.1 MG/L (ref 0–8.2)
DIFFERENTIAL METHOD: ABNORMAL
EOSINOPHIL # BLD AUTO: 0.3 K/UL (ref 0–0.5)
EOSINOPHIL NFR BLD: 4 % (ref 0–8)
ERYTHROCYTE [DISTWIDTH] IN BLOOD BY AUTOMATED COUNT: 13.2 % (ref 11.5–14.5)
EST. GFR  (NO RACE VARIABLE): >60 ML/MIN/1.73 M^2
GLUCOSE SERPL-MCNC: 92 MG/DL (ref 70–110)
HCT VFR BLD AUTO: 40.9 % (ref 40–54)
HGB BLD-MCNC: 13.4 G/DL (ref 14–18)
IMM GRANULOCYTES # BLD AUTO: 0.06 K/UL (ref 0–0.04)
IMM GRANULOCYTES NFR BLD AUTO: 0.9 % (ref 0–0.5)
LYMPHOCYTES # BLD AUTO: 1.9 K/UL (ref 1–4.8)
LYMPHOCYTES NFR BLD: 27.6 % (ref 18–48)
MCH RBC QN AUTO: 29.3 PG (ref 27–31)
MCHC RBC AUTO-ENTMCNC: 32.8 G/DL (ref 32–36)
MCV RBC AUTO: 89 FL (ref 82–98)
MONOCYTES # BLD AUTO: 0.7 K/UL (ref 0.3–1)
MONOCYTES NFR BLD: 9.5 % (ref 4–15)
NEUTROPHILS # BLD AUTO: 4 K/UL (ref 1.8–7.7)
NEUTROPHILS NFR BLD: 57.6 % (ref 38–73)
NRBC BLD-RTO: 0 /100 WBC
PLATELET # BLD AUTO: 222 K/UL (ref 150–450)
PMV BLD AUTO: 10.7 FL (ref 9.2–12.9)
POTASSIUM SERPL-SCNC: 4.3 MMOL/L (ref 3.5–5.1)
PROT SERPL-MCNC: 6.9 G/DL (ref 6–8.4)
RBC # BLD AUTO: 4.58 M/UL (ref 4.6–6.2)
SODIUM SERPL-SCNC: 139 MMOL/L (ref 136–145)
WBC # BLD AUTO: 6.95 K/UL (ref 3.9–12.7)

## 2022-12-27 PROCEDURE — 80053 COMPREHEN METABOLIC PANEL: CPT | Performed by: INTERNAL MEDICINE

## 2022-12-27 PROCEDURE — 85025 COMPLETE CBC W/AUTO DIFF WBC: CPT | Performed by: INTERNAL MEDICINE

## 2022-12-27 PROCEDURE — 86140 C-REACTIVE PROTEIN: CPT | Performed by: INTERNAL MEDICINE

## 2022-12-29 ENCOUNTER — OFFICE VISIT (OUTPATIENT)
Dept: PODIATRY | Facility: CLINIC | Age: 57
End: 2022-12-29
Payer: COMMERCIAL

## 2022-12-29 VITALS
HEART RATE: 61 BPM | DIASTOLIC BLOOD PRESSURE: 83 MMHG | BODY MASS INDEX: 34.88 KG/M2 | HEIGHT: 74 IN | WEIGHT: 271.81 LBS | SYSTOLIC BLOOD PRESSURE: 174 MMHG

## 2022-12-29 DIAGNOSIS — M86.271 SUBACUTE OSTEOMYELITIS OF RIGHT FOOT: Primary | ICD-10-CM

## 2022-12-29 DIAGNOSIS — E11.49 TYPE II DIABETES MELLITUS WITH NEUROLOGICAL MANIFESTATIONS: ICD-10-CM

## 2022-12-29 DIAGNOSIS — L97.512 FOOT ULCER, RIGHT, WITH FAT LAYER EXPOSED: ICD-10-CM

## 2022-12-29 PROCEDURE — 3066F PR DOCUMENTATION OF TREATMENT FOR NEPHROPATHY: ICD-10-PCS | Mod: CPTII,S$GLB,, | Performed by: PODIATRIST

## 2022-12-29 PROCEDURE — 3008F PR BODY MASS INDEX (BMI) DOCUMENTED: ICD-10-PCS | Mod: CPTII,S$GLB,, | Performed by: PODIATRIST

## 2022-12-29 PROCEDURE — 3066F NEPHROPATHY DOC TX: CPT | Mod: CPTII,S$GLB,, | Performed by: PODIATRIST

## 2022-12-29 PROCEDURE — 99024 POSTOP FOLLOW-UP VISIT: CPT | Mod: S$GLB,,, | Performed by: PODIATRIST

## 2022-12-29 PROCEDURE — 99999 PR PBB SHADOW E&M-EST. PATIENT-LVL III: CPT | Mod: PBBFAC,,, | Performed by: PODIATRIST

## 2022-12-29 PROCEDURE — 3077F SYST BP >= 140 MM HG: CPT | Mod: CPTII,S$GLB,, | Performed by: PODIATRIST

## 2022-12-29 PROCEDURE — 99999 PR PBB SHADOW E&M-EST. PATIENT-LVL III: ICD-10-PCS | Mod: PBBFAC,,, | Performed by: PODIATRIST

## 2022-12-29 PROCEDURE — 3051F PR MOST RECENT HEMOGLOBIN A1C LEVEL 7.0 - < 8.0%: ICD-10-PCS | Mod: CPTII,S$GLB,, | Performed by: PODIATRIST

## 2022-12-29 PROCEDURE — 3060F POS MICROALBUMINURIA REV: CPT | Mod: CPTII,S$GLB,, | Performed by: PODIATRIST

## 2022-12-29 PROCEDURE — 4010F ACE/ARB THERAPY RXD/TAKEN: CPT | Mod: CPTII,S$GLB,, | Performed by: PODIATRIST

## 2022-12-29 PROCEDURE — 99024 PR POST-OP FOLLOW-UP VISIT: ICD-10-PCS | Mod: S$GLB,,, | Performed by: PODIATRIST

## 2022-12-29 PROCEDURE — 3077F PR MOST RECENT SYSTOLIC BLOOD PRESSURE >= 140 MM HG: ICD-10-PCS | Mod: CPTII,S$GLB,, | Performed by: PODIATRIST

## 2022-12-29 PROCEDURE — 3079F DIAST BP 80-89 MM HG: CPT | Mod: CPTII,S$GLB,, | Performed by: PODIATRIST

## 2022-12-29 PROCEDURE — 1159F MED LIST DOCD IN RCRD: CPT | Mod: CPTII,S$GLB,, | Performed by: PODIATRIST

## 2022-12-29 PROCEDURE — 1159F PR MEDICATION LIST DOCUMENTED IN MEDICAL RECORD: ICD-10-PCS | Mod: CPTII,S$GLB,, | Performed by: PODIATRIST

## 2022-12-29 PROCEDURE — 3079F PR MOST RECENT DIASTOLIC BLOOD PRESSURE 80-89 MM HG: ICD-10-PCS | Mod: CPTII,S$GLB,, | Performed by: PODIATRIST

## 2022-12-29 PROCEDURE — 3008F BODY MASS INDEX DOCD: CPT | Mod: CPTII,S$GLB,, | Performed by: PODIATRIST

## 2022-12-29 PROCEDURE — 3060F PR POS MICROALBUMINURIA RESULT DOCUMENTED/REVIEW: ICD-10-PCS | Mod: CPTII,S$GLB,, | Performed by: PODIATRIST

## 2022-12-29 PROCEDURE — 4010F PR ACE/ARB THEARPY RXD/TAKEN: ICD-10-PCS | Mod: CPTII,S$GLB,, | Performed by: PODIATRIST

## 2022-12-29 PROCEDURE — 3051F HG A1C>EQUAL 7.0%<8.0%: CPT | Mod: CPTII,S$GLB,, | Performed by: PODIATRIST

## 2022-12-31 NOTE — PROGRESS NOTES
Subjective:      Patient ID: Billy Rivera is a 57 y.o. male.    Chief Complaint: Post-op Evaluation and Diabetes Mellitus (Pcp-Samuel 12/7/22)      Billy is a 57 y.o. male who presents to the clinic for evaluation and treatment of high risk feet. Billy has a past medical history of Allergy, CAD (coronary artery disease), native coronary artery (6/25/2013), Cancer, COVID-19 virus detected (9/1/2020), Diabetes mellitus, Diabetes mellitus, type 2, Disorder of kidney and ureter, Heart attack (04/2012), colon cancer, stage I, Hyperlipidemia, Hypertension, Muscular pain, NSTEMI May 2013 - peak troponin 0.22 (5/22/2013), MICHAELA (obstructive sleep apnea), and Retinopathy due to secondary diabetes. The patient's chief complaint is foot ulcer both feet.   Follow-up after recent hospitalization, doing well in terms of his feet.    PCP: BRAD Baker MD    Date Last Seen by PCP: 3/23/2022    Current shoe gear:  Affected Foot: DARCO Shoe right foot     Affected Foot: Tennis shoe left foot    History of Trauma: negative  Sign of Infection: none    Hemoglobin A1C   Date Value Ref Range Status   11/10/2022 7.1 (H) 4.0 - 5.6 % Final     Comment:     ADA Screening Guidelines:  5.7-6.4%  Consistent with prediabetes  >or=6.5%  Consistent with diabetes    High levels of fetal hemoglobin interfere with the HbA1C  assay. Heterozygous hemoglobin variants (HbS, HgC, etc)do  not significantly interfere with this assay.   However, presence of multiple variants may affect accuracy.     08/09/2022 8.0 (H) 4.0 - 5.6 % Final     Comment:     ADA Screening Guidelines:  5.7-6.4%  Consistent with prediabetes  >or=6.5%  Consistent with diabetes    High levels of fetal hemoglobin interfere with the HbA1C  assay. Heterozygous hemoglobin variants (HbS, HgC, etc)do  not significantly interfere with this assay.   However, presence of multiple variants may affect accuracy.     06/08/2022 10.0 (H) 4.0 - 5.6 % Final     Comment:     ADA  Screening Guidelines:  5.7-6.4%  Consistent with prediabetes  >or=6.5%  Consistent with diabetes    High levels of fetal hemoglobin interfere with the HbA1C  assay. Heterozygous hemoglobin variants (HbS, HgC, etc)do  not significantly interfere with this assay.   However, presence of multiple variants may affect accuracy.         Review of Systems   Constitutional: Negative for chills, decreased appetite, fever, malaise/fatigue and night sweats.   HENT:  Negative for congestion, hearing loss, nosebleeds and tinnitus.    Eyes:  Negative for double vision, pain, photophobia and visual disturbance.   Cardiovascular:  Negative for chest pain, claudication, cyanosis and leg swelling.   Respiratory:  Negative for cough, hemoptysis, shortness of breath and wheezing.    Endocrine: Negative for cold intolerance and heat intolerance.   Hematologic/Lymphatic: Negative for adenopathy and bleeding problem.   Skin:  Positive for poor wound healing. Negative for color change, dry skin, itching, nail changes, rash and suspicious lesions.   Musculoskeletal:  Negative for arthritis, falls, gout, joint pain, myalgias and stiffness.   Gastrointestinal:  Negative for abdominal pain, jaundice, nausea and vomiting.   Genitourinary:  Negative for dysuria, frequency and hematuria.   Neurological:  Positive for numbness and sensory change. Negative for difficulty with concentration, loss of balance and paresthesias.   Psychiatric/Behavioral:  Negative for altered mental status, hallucinations and suicidal ideas. The patient is not nervous/anxious.    Allergic/Immunologic: Negative for environmental allergies and persistent infections.         Objective:        Physical Exam  Constitutional:       General: He is not in acute distress.     Appearance: Normal appearance. He is well-developed.   HENT:      Head: Normocephalic and atraumatic.   Cardiovascular:      Pulses:           Dorsalis pedis pulses are 2+ on the right side and 2+ on the  left side.        Posterior tibial pulses are 2+ on the right side and 2+ on the left side.      Comments: CFT< 3 secs all toes bilateral foot, skin temp warm to cool bilateral foot, no hair growth bilateral lower extremity, no edema to bilateral lower extremities    Pulmonary:      Effort: Pulmonary effort is normal.   Musculoskeletal:         General: No swelling.      Right foot: Normal range of motion. No deformity.      Left foot: Normal range of motion. No deformity.      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     5/5 muscle strength with DF/PF/Inv/Ev bilateral.    Inspection and palpation of the joints and bones reveal no crepitus or joint effusion. No tenderness upon palpation.  Angle and base of gait are normal.    Feet:      Right foot:      Skin integrity: No skin breakdown or erythema.      Left foot:      Skin integrity: No skin breakdown or erythema.   Skin:     General: Skin is warm and dry.      Capillary Refill: Capillary refill takes 2 to 3 seconds.      Findings: No erythema.      Nails: There is no clubbing.      Comments: Ulceration location:  Ulceration right 3rd digit 0.2 cm in diameter by 0.2 cm in depth.  Post debridement.   Neurological:      Mental Status: He is alert and oriented to person, place, and time.      Sensory: Sensory deficit present.      Motor: No weakness.      Deep Tendon Reflexes:      Reflex Scores:       Patellar reflexes are 2+ on the right side and 2+ on the left side.       Achilles reflexes are 2+ on the right side and 2+ on the left side.     Comments: Diminished/loss of protective sensation all toes bilateral to 10 gram monofilament.   Psychiatric:         Mood and Affect: Mood normal.         Behavior: Behavior normal.         Thought Content: Thought content normal.             Assessment:       Encounter Diagnoses   Name Primary?    Subacute osteomyelitis of right foot Yes    Foot ulcer, right, with fat layer  "exposed     Type II diabetes mellitus with neurological manifestations            Plan:       Billy was seen today for post-op evaluation and diabetes mellitus.    Diagnoses and all orders for this visit:    Subacute osteomyelitis of right foot    Foot ulcer, right, with fat layer exposed    Type II diabetes mellitus with neurological manifestations      Wound Debridement    Performed by: Mingo Melara DPM   Authorized by: Patient    Time out: Immediately prior to procedure a "time out" was called to verify the correct patient, procedure, equipment, support staff and site/side marked as required.   Consent Done?:  Yes (Verbal)  Local anesthesia used?: No       Wound Details:    Location:  right foot     Type of Debridement:  Excisional       Length (cm):  0.2       Width (cm):  0.2       Depth (cm):  0.2       Percent Debrided (%):  100             Depth of debridement:  Subcutaneous tissue    Tissue debrided:  Dermis, Epidermis and Subcutaneous    Devitalized tissue debrided:  Biofilm, Callus and Necrotic/Eschar    Instruments:  Blade, Curette and Nippers     Bleeding:  Minimal  Hemostasis Achieved: Yes    Method Used:  Pressure  Patient tolerance:  Patient tolerated the procedure well with no immediate complications    Overall improving, left foot healed.  Right foot improving.  I counseled the patient on his conditions, their implications and medical management.  The nature of the condition, options for management, as well as potential risks and complications were discussed in detail with patient. Patient was amenable to my recommendations and left my office fully informed and will follow up as instructed or sooner if necessary.    F/U 2 weeks.      "

## 2023-01-03 ENCOUNTER — LAB VISIT (OUTPATIENT)
Dept: LAB | Facility: HOSPITAL | Age: 58
End: 2023-01-03
Attending: PHYSICIAN ASSISTANT
Payer: COMMERCIAL

## 2023-01-03 DIAGNOSIS — E11.69 DIABETIC FOOT ULCER WITH OSTEOMYELITIS: Primary | ICD-10-CM

## 2023-01-03 DIAGNOSIS — L97.509 DIABETIC FOOT ULCER WITH OSTEOMYELITIS: Primary | ICD-10-CM

## 2023-01-03 DIAGNOSIS — M86.9 DIABETIC FOOT ULCER WITH OSTEOMYELITIS: Primary | ICD-10-CM

## 2023-01-03 DIAGNOSIS — E11.621 DIABETIC FOOT ULCER WITH OSTEOMYELITIS: Primary | ICD-10-CM

## 2023-01-03 LAB
ALBUMIN SERPL BCP-MCNC: 3.4 G/DL (ref 3.5–5.2)
ALP SERPL-CCNC: 107 U/L (ref 55–135)
ALT SERPL W/O P-5'-P-CCNC: 30 U/L (ref 10–44)
ANION GAP SERPL CALC-SCNC: 10 MMOL/L (ref 8–16)
AST SERPL-CCNC: 22 U/L (ref 10–40)
BASOPHILS # BLD AUTO: 0.04 K/UL (ref 0–0.2)
BASOPHILS NFR BLD: 0.5 % (ref 0–1.9)
BILIRUB SERPL-MCNC: 0.5 MG/DL (ref 0.1–1)
BUN SERPL-MCNC: 23 MG/DL (ref 6–20)
CALCIUM SERPL-MCNC: 9 MG/DL (ref 8.7–10.5)
CHLORIDE SERPL-SCNC: 104 MMOL/L (ref 95–110)
CO2 SERPL-SCNC: 23 MMOL/L (ref 23–29)
CREAT SERPL-MCNC: 0.9 MG/DL (ref 0.5–1.4)
CRP SERPL-MCNC: 4.5 MG/L (ref 0–8.2)
DIFFERENTIAL METHOD: ABNORMAL
EOSINOPHIL # BLD AUTO: 0.3 K/UL (ref 0–0.5)
EOSINOPHIL NFR BLD: 3.6 % (ref 0–8)
ERYTHROCYTE [DISTWIDTH] IN BLOOD BY AUTOMATED COUNT: 13.5 % (ref 11.5–14.5)
EST. GFR  (NO RACE VARIABLE): >60 ML/MIN/1.73 M^2
GLUCOSE SERPL-MCNC: 151 MG/DL (ref 70–110)
HCT VFR BLD AUTO: 41 % (ref 40–54)
HGB BLD-MCNC: 13.6 G/DL (ref 14–18)
IMM GRANULOCYTES # BLD AUTO: 0.07 K/UL (ref 0–0.04)
IMM GRANULOCYTES NFR BLD AUTO: 0.9 % (ref 0–0.5)
LYMPHOCYTES # BLD AUTO: 1.4 K/UL (ref 1–4.8)
LYMPHOCYTES NFR BLD: 18.4 % (ref 18–48)
MCH RBC QN AUTO: 29.4 PG (ref 27–31)
MCHC RBC AUTO-ENTMCNC: 33.2 G/DL (ref 32–36)
MCV RBC AUTO: 89 FL (ref 82–98)
MONOCYTES # BLD AUTO: 0.7 K/UL (ref 0.3–1)
MONOCYTES NFR BLD: 8.8 % (ref 4–15)
NEUTROPHILS # BLD AUTO: 5.3 K/UL (ref 1.8–7.7)
NEUTROPHILS NFR BLD: 67.8 % (ref 38–73)
NRBC BLD-RTO: 0 /100 WBC
PLATELET # BLD AUTO: 228 K/UL (ref 150–450)
PMV BLD AUTO: 11 FL (ref 9.2–12.9)
POTASSIUM SERPL-SCNC: 4.8 MMOL/L (ref 3.5–5.1)
PROT SERPL-MCNC: 7.1 G/DL (ref 6–8.4)
RBC # BLD AUTO: 4.62 M/UL (ref 4.6–6.2)
SODIUM SERPL-SCNC: 137 MMOL/L (ref 136–145)
WBC # BLD AUTO: 7.83 K/UL (ref 3.9–12.7)

## 2023-01-03 PROCEDURE — 85025 COMPLETE CBC W/AUTO DIFF WBC: CPT | Performed by: PHYSICIAN ASSISTANT

## 2023-01-03 PROCEDURE — 80053 COMPREHEN METABOLIC PANEL: CPT | Performed by: PHYSICIAN ASSISTANT

## 2023-01-03 PROCEDURE — 86140 C-REACTIVE PROTEIN: CPT | Performed by: PHYSICIAN ASSISTANT

## 2023-01-06 ENCOUNTER — INFUSION (OUTPATIENT)
Dept: INFECTIOUS DISEASES | Facility: HOSPITAL | Age: 58
End: 2023-01-06
Attending: INTERNAL MEDICINE
Payer: COMMERCIAL

## 2023-01-06 ENCOUNTER — OFFICE VISIT (OUTPATIENT)
Dept: INFECTIOUS DISEASES | Facility: CLINIC | Age: 58
End: 2023-01-06
Payer: COMMERCIAL

## 2023-01-06 VITALS
TEMPERATURE: 98 F | BODY MASS INDEX: 35.45 KG/M2 | HEIGHT: 74 IN | WEIGHT: 276.25 LBS | SYSTOLIC BLOOD PRESSURE: 127 MMHG | HEART RATE: 51 BPM | DIASTOLIC BLOOD PRESSURE: 71 MMHG

## 2023-01-06 DIAGNOSIS — M86.171 OTHER ACUTE OSTEOMYELITIS OF RIGHT FOOT: Primary | ICD-10-CM

## 2023-01-06 PROCEDURE — 3078F DIAST BP <80 MM HG: CPT | Mod: CPTII,S$GLB,, | Performed by: STUDENT IN AN ORGANIZED HEALTH CARE EDUCATION/TRAINING PROGRAM

## 2023-01-06 PROCEDURE — 3008F PR BODY MASS INDEX (BMI) DOCUMENTED: ICD-10-PCS | Mod: CPTII,S$GLB,, | Performed by: STUDENT IN AN ORGANIZED HEALTH CARE EDUCATION/TRAINING PROGRAM

## 2023-01-06 PROCEDURE — 3074F PR MOST RECENT SYSTOLIC BLOOD PRESSURE < 130 MM HG: ICD-10-PCS | Mod: CPTII,S$GLB,, | Performed by: STUDENT IN AN ORGANIZED HEALTH CARE EDUCATION/TRAINING PROGRAM

## 2023-01-06 PROCEDURE — 3074F SYST BP LT 130 MM HG: CPT | Mod: CPTII,S$GLB,, | Performed by: STUDENT IN AN ORGANIZED HEALTH CARE EDUCATION/TRAINING PROGRAM

## 2023-01-06 PROCEDURE — 99214 PR OFFICE/OUTPT VISIT, EST, LEVL IV, 30-39 MIN: ICD-10-PCS | Mod: S$GLB,,, | Performed by: STUDENT IN AN ORGANIZED HEALTH CARE EDUCATION/TRAINING PROGRAM

## 2023-01-06 PROCEDURE — 99214 OFFICE O/P EST MOD 30 MIN: CPT | Mod: S$GLB,,, | Performed by: STUDENT IN AN ORGANIZED HEALTH CARE EDUCATION/TRAINING PROGRAM

## 2023-01-06 PROCEDURE — 3008F BODY MASS INDEX DOCD: CPT | Mod: CPTII,S$GLB,, | Performed by: STUDENT IN AN ORGANIZED HEALTH CARE EDUCATION/TRAINING PROGRAM

## 2023-01-06 PROCEDURE — 99999 PR PBB SHADOW E&M-EST. PATIENT-LVL IV: CPT | Mod: PBBFAC,,, | Performed by: STUDENT IN AN ORGANIZED HEALTH CARE EDUCATION/TRAINING PROGRAM

## 2023-01-06 PROCEDURE — 99999 PR PBB SHADOW E&M-EST. PATIENT-LVL IV: ICD-10-PCS | Mod: PBBFAC,,, | Performed by: STUDENT IN AN ORGANIZED HEALTH CARE EDUCATION/TRAINING PROGRAM

## 2023-01-06 PROCEDURE — 1159F MED LIST DOCD IN RCRD: CPT | Mod: CPTII,S$GLB,, | Performed by: STUDENT IN AN ORGANIZED HEALTH CARE EDUCATION/TRAINING PROGRAM

## 2023-01-06 PROCEDURE — 1159F PR MEDICATION LIST DOCUMENTED IN MEDICAL RECORD: ICD-10-PCS | Mod: CPTII,S$GLB,, | Performed by: STUDENT IN AN ORGANIZED HEALTH CARE EDUCATION/TRAINING PROGRAM

## 2023-01-06 PROCEDURE — 3078F PR MOST RECENT DIASTOLIC BLOOD PRESSURE < 80 MM HG: ICD-10-PCS | Mod: CPTII,S$GLB,, | Performed by: STUDENT IN AN ORGANIZED HEALTH CARE EDUCATION/TRAINING PROGRAM

## 2023-01-06 NOTE — PROGRESS NOTES
PICC line removed per MD order, measured tip @ 40c, CDI, observed pt for 30 min, pt tolerated well, vaseline gauze and 2x2 gauze applied, wrapped with coflex, pt instructed to keep dressing on & dry for 24 hrs then remove, monitor for s/s of infection & notify MD, pt verbalized understanding of all above & left in NAD

## 2023-01-10 ENCOUNTER — PATIENT MESSAGE (OUTPATIENT)
Dept: INTERNAL MEDICINE | Facility: CLINIC | Age: 58
End: 2023-01-10
Payer: COMMERCIAL

## 2023-01-10 ENCOUNTER — OFFICE VISIT (OUTPATIENT)
Dept: PODIATRY | Facility: CLINIC | Age: 58
End: 2023-01-10
Payer: COMMERCIAL

## 2023-01-10 VITALS
DIASTOLIC BLOOD PRESSURE: 73 MMHG | WEIGHT: 276.25 LBS | SYSTOLIC BLOOD PRESSURE: 122 MMHG | HEART RATE: 68 BPM | BODY MASS INDEX: 35.47 KG/M2

## 2023-01-10 DIAGNOSIS — L97.512 FOOT ULCER, RIGHT, WITH FAT LAYER EXPOSED: ICD-10-CM

## 2023-01-10 DIAGNOSIS — L97.522 FOOT ULCER, LEFT, WITH FAT LAYER EXPOSED: Primary | ICD-10-CM

## 2023-01-10 DIAGNOSIS — E11.65 UNCONTROLLED TYPE 2 DIABETES MELLITUS WITH HYPERGLYCEMIA: ICD-10-CM

## 2023-01-10 DIAGNOSIS — E11.49 TYPE II DIABETES MELLITUS WITH NEUROLOGICAL MANIFESTATIONS: ICD-10-CM

## 2023-01-10 PROCEDURE — 3008F BODY MASS INDEX DOCD: CPT | Mod: CPTII,S$GLB,, | Performed by: PODIATRIST

## 2023-01-10 PROCEDURE — 3074F SYST BP LT 130 MM HG: CPT | Mod: CPTII,S$GLB,, | Performed by: PODIATRIST

## 2023-01-10 PROCEDURE — 3078F PR MOST RECENT DIASTOLIC BLOOD PRESSURE < 80 MM HG: ICD-10-PCS | Mod: CPTII,S$GLB,, | Performed by: PODIATRIST

## 2023-01-10 PROCEDURE — 99024 POSTOP FOLLOW-UP VISIT: CPT | Mod: S$GLB,,, | Performed by: PODIATRIST

## 2023-01-10 PROCEDURE — 3074F PR MOST RECENT SYSTOLIC BLOOD PRESSURE < 130 MM HG: ICD-10-PCS | Mod: CPTII,S$GLB,, | Performed by: PODIATRIST

## 2023-01-10 PROCEDURE — 1159F PR MEDICATION LIST DOCUMENTED IN MEDICAL RECORD: ICD-10-PCS | Mod: CPTII,S$GLB,, | Performed by: PODIATRIST

## 2023-01-10 PROCEDURE — 99024 PR POST-OP FOLLOW-UP VISIT: ICD-10-PCS | Mod: S$GLB,,, | Performed by: PODIATRIST

## 2023-01-10 PROCEDURE — 1159F MED LIST DOCD IN RCRD: CPT | Mod: CPTII,S$GLB,, | Performed by: PODIATRIST

## 2023-01-10 PROCEDURE — 99999 PR PBB SHADOW E&M-EST. PATIENT-LVL III: CPT | Mod: PBBFAC,,, | Performed by: PODIATRIST

## 2023-01-10 PROCEDURE — 99999 PR PBB SHADOW E&M-EST. PATIENT-LVL III: ICD-10-PCS | Mod: PBBFAC,,, | Performed by: PODIATRIST

## 2023-01-10 PROCEDURE — 3078F DIAST BP <80 MM HG: CPT | Mod: CPTII,S$GLB,, | Performed by: PODIATRIST

## 2023-01-10 PROCEDURE — 3008F PR BODY MASS INDEX (BMI) DOCUMENTED: ICD-10-PCS | Mod: CPTII,S$GLB,, | Performed by: PODIATRIST

## 2023-01-10 NOTE — PROGRESS NOTES
Subjective:      Patient ID: Billy Rivera is a 57 y.o. male.    Chief Complaint: Diabetes Mellitus and Wound Care      Billy is a 57 y.o. male who presents to the clinic for evaluation and treatment of high risk feet. Billy has a past medical history of Allergy, CAD (coronary artery disease), native coronary artery (6/25/2013), Cancer, COVID-19 virus detected (9/1/2020), Diabetes mellitus, Diabetes mellitus, type 2, Disorder of kidney and ureter, Heart attack (04/2012), colon cancer, stage I, Hyperlipidemia, Hypertension, Muscular pain, NSTEMI May 2013 - peak troponin 0.22 (5/22/2013), MICHAELA (obstructive sleep apnea), and Retinopathy due to secondary diabetes. The patient's chief complaint is foot ulcer both feet, no new complaints.  He is doing well.    PCP: BRAD Baker MD    Date Last Seen by PCP: 3/23/2022    Current shoe gear:  Affected Foot: DARCO Shoe right foot     Affected Foot: Tennis shoe left foot    History of Trauma: negative  Sign of Infection: none    Hemoglobin A1C   Date Value Ref Range Status   11/10/2022 7.1 (H) 4.0 - 5.6 % Final     Comment:     ADA Screening Guidelines:  5.7-6.4%  Consistent with prediabetes  >or=6.5%  Consistent with diabetes    High levels of fetal hemoglobin interfere with the HbA1C  assay. Heterozygous hemoglobin variants (HbS, HgC, etc)do  not significantly interfere with this assay.   However, presence of multiple variants may affect accuracy.     08/09/2022 8.0 (H) 4.0 - 5.6 % Final     Comment:     ADA Screening Guidelines:  5.7-6.4%  Consistent with prediabetes  >or=6.5%  Consistent with diabetes    High levels of fetal hemoglobin interfere with the HbA1C  assay. Heterozygous hemoglobin variants (HbS, HgC, etc)do  not significantly interfere with this assay.   However, presence of multiple variants may affect accuracy.     06/08/2022 10.0 (H) 4.0 - 5.6 % Final     Comment:     ADA Screening Guidelines:  5.7-6.4%  Consistent with prediabetes  >or=6.5%   Consistent with diabetes    High levels of fetal hemoglobin interfere with the HbA1C  assay. Heterozygous hemoglobin variants (HbS, HgC, etc)do  not significantly interfere with this assay.   However, presence of multiple variants may affect accuracy.         Review of Systems   Constitutional: Negative for chills, decreased appetite, fever, malaise/fatigue and night sweats.   HENT:  Negative for congestion, hearing loss, nosebleeds and tinnitus.    Eyes:  Negative for double vision, pain, photophobia and visual disturbance.   Cardiovascular:  Negative for chest pain, claudication, cyanosis and leg swelling.   Respiratory:  Negative for cough, hemoptysis, shortness of breath and wheezing.    Endocrine: Negative for cold intolerance and heat intolerance.   Hematologic/Lymphatic: Negative for adenopathy and bleeding problem.   Skin:  Positive for poor wound healing. Negative for color change, dry skin, itching, nail changes, rash and suspicious lesions.   Musculoskeletal:  Negative for arthritis, falls, gout, joint pain, myalgias and stiffness.   Gastrointestinal:  Negative for abdominal pain, jaundice, nausea and vomiting.   Genitourinary:  Negative for dysuria, frequency and hematuria.   Neurological:  Positive for numbness and sensory change. Negative for difficulty with concentration, loss of balance and paresthesias.   Psychiatric/Behavioral:  Negative for altered mental status, hallucinations and suicidal ideas. The patient is not nervous/anxious.    Allergic/Immunologic: Negative for environmental allergies and persistent infections.         Objective:        Physical Exam  Constitutional:       General: He is not in acute distress.     Appearance: Normal appearance. He is well-developed.   HENT:      Head: Normocephalic and atraumatic.   Cardiovascular:      Pulses:           Dorsalis pedis pulses are 2+ on the right side and 2+ on the left side.        Posterior tibial pulses are 2+ on the right side and 2+  on the left side.      Comments: CFT< 3 secs all toes bilateral foot, skin temp warm to cool bilateral foot, no hair growth bilateral lower extremity, no edema to bilateral lower extremities    Pulmonary:      Effort: Pulmonary effort is normal.   Musculoskeletal:         General: No swelling.      Right foot: Normal range of motion. No deformity.      Left foot: Normal range of motion. No deformity.      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     5/5 muscle strength with DF/PF/Inv/Ev bilateral.    Inspection and palpation of the joints and bones reveal no crepitus or joint effusion. No tenderness upon palpation.  Angle and base of gait are normal.    Feet:      Right foot:      Skin integrity: No skin breakdown or erythema.      Left foot:      Skin integrity: No skin breakdown or erythema.   Skin:     General: Skin is warm and dry.      Capillary Refill: Capillary refill takes 2 to 3 seconds.      Findings: No erythema.      Nails: There is no clubbing.      Comments: Ulceration location:  Ulcerations bilateral sub 3rd MPJ both measure approximately 0.2 cm in diameter by 0.1 cm in depth post debridement measurements.  Improving.  Hyperkeratotic tissue surrounding.   Neurological:      Mental Status: He is alert and oriented to person, place, and time.      Sensory: Sensory deficit present.      Motor: No weakness.      Deep Tendon Reflexes:      Reflex Scores:       Patellar reflexes are 2+ on the right side and 2+ on the left side.       Achilles reflexes are 2+ on the right side and 2+ on the left side.     Comments: Diminished/loss of protective sensation all toes bilateral to 10 gram monofilament.   Psychiatric:         Mood and Affect: Mood normal.         Behavior: Behavior normal.         Thought Content: Thought content normal.             Assessment:       No diagnosis found.          Plan:       There are no diagnoses linked to this encounter.      Wound  "Debridement/post debridement    Performed by: Mingo Melara DPM   Authorized by: Patient    Time out: Immediately prior to procedure a "time out" was called to verify the correct patient, procedure, equipment, support staff and site/side marked as required.   Consent Done?:  Yes (Verbal)  Local anesthesia used?: No       Wound Details:    Location:  right foot     Type of Debridement:  Excisional       Length (cm):  0.2       Width (cm):  0.2       Depth (cm):  0.1       Percent Debrided (%):  100             Depth of debridement:  Subcutaneous tissue    Tissue debrided:  Dermis, Epidermis and Subcutaneous    Devitalized tissue debrided:  Biofilm, Callus and Necrotic/Eschar    Instruments:  Blade, Curette and Nippers     Bleeding:  Minimal  Hemostasis Achieved: Yes    Method Used:  Pressure  Patient tolerance:  Patient tolerated the procedure well with no immediate complications    I counseled the patient on his conditions, their implications and medical management.  The nature of the condition, options for management, as well as potential risks and complications were discussed in detail with patient. Patient was amenable to my recommendations and left my office fully informed and will follow up as instructed or sooner if necessary.    Wound care discussed in detail.  Follow-up in 2 weeks.          "

## 2023-01-11 RX ORDER — DULAGLUTIDE 1.5 MG/.5ML
1.5 INJECTION, SOLUTION SUBCUTANEOUS
Qty: 4 PEN | Refills: 4 | Status: SHIPPED | OUTPATIENT
Start: 2023-01-11 | End: 2023-01-14 | Stop reason: SDUPTHER

## 2023-01-11 NOTE — TELEPHONE ENCOUNTER
The message says that he is asking for trulicity, yet you sent me testing strips??  I don't see trulicity on his medication list??   What dose of Trulicity is he taking? Send me that please   Thank you

## 2023-01-11 NOTE — TELEPHONE ENCOUNTER
Pt requesting Trulicity to be sent to [facility]  pharmacy, its the only way its covered

## 2023-01-14 LAB
ACID FAST MOD KINY STN SPEC: NORMAL
MYCOBACTERIUM SPEC QL CULT: NORMAL

## 2023-01-23 ENCOUNTER — TELEPHONE (OUTPATIENT)
Dept: PODIATRY | Facility: CLINIC | Age: 58
End: 2023-01-23
Payer: COMMERCIAL

## 2023-01-26 ENCOUNTER — OFFICE VISIT (OUTPATIENT)
Dept: PODIATRY | Facility: CLINIC | Age: 58
End: 2023-01-26
Payer: COMMERCIAL

## 2023-01-26 VITALS
HEART RATE: 50 BPM | HEIGHT: 74 IN | SYSTOLIC BLOOD PRESSURE: 137 MMHG | DIASTOLIC BLOOD PRESSURE: 82 MMHG | BODY MASS INDEX: 35.42 KG/M2 | WEIGHT: 276 LBS

## 2023-01-26 DIAGNOSIS — E11.49 TYPE II DIABETES MELLITUS WITH NEUROLOGICAL MANIFESTATIONS: ICD-10-CM

## 2023-01-26 DIAGNOSIS — L97.512 FOOT ULCER, RIGHT, WITH FAT LAYER EXPOSED: ICD-10-CM

## 2023-01-26 DIAGNOSIS — L97.522 FOOT ULCER, LEFT, WITH FAT LAYER EXPOSED: Primary | ICD-10-CM

## 2023-01-26 PROCEDURE — 1159F MED LIST DOCD IN RCRD: CPT | Mod: CPTII,S$GLB,, | Performed by: PODIATRIST

## 2023-01-26 PROCEDURE — 99999 PR PBB SHADOW E&M-EST. PATIENT-LVL III: ICD-10-PCS | Mod: PBBFAC,,, | Performed by: PODIATRIST

## 2023-01-26 PROCEDURE — 1159F PR MEDICATION LIST DOCUMENTED IN MEDICAL RECORD: ICD-10-PCS | Mod: CPTII,S$GLB,, | Performed by: PODIATRIST

## 2023-01-26 PROCEDURE — 3075F SYST BP GE 130 - 139MM HG: CPT | Mod: CPTII,S$GLB,, | Performed by: PODIATRIST

## 2023-01-26 PROCEDURE — 3008F PR BODY MASS INDEX (BMI) DOCUMENTED: ICD-10-PCS | Mod: CPTII,S$GLB,, | Performed by: PODIATRIST

## 2023-01-26 PROCEDURE — 3079F PR MOST RECENT DIASTOLIC BLOOD PRESSURE 80-89 MM HG: ICD-10-PCS | Mod: CPTII,S$GLB,, | Performed by: PODIATRIST

## 2023-01-26 PROCEDURE — 3075F PR MOST RECENT SYSTOLIC BLOOD PRESS GE 130-139MM HG: ICD-10-PCS | Mod: CPTII,S$GLB,, | Performed by: PODIATRIST

## 2023-01-26 PROCEDURE — 3079F DIAST BP 80-89 MM HG: CPT | Mod: CPTII,S$GLB,, | Performed by: PODIATRIST

## 2023-01-26 PROCEDURE — 3008F BODY MASS INDEX DOCD: CPT | Mod: CPTII,S$GLB,, | Performed by: PODIATRIST

## 2023-01-26 PROCEDURE — 99213 OFFICE O/P EST LOW 20 MIN: CPT | Mod: 25,S$GLB,, | Performed by: PODIATRIST

## 2023-01-26 PROCEDURE — 99213 PR OFFICE/OUTPT VISIT, EST, LEVL III, 20-29 MIN: ICD-10-PCS | Mod: 25,S$GLB,, | Performed by: PODIATRIST

## 2023-01-26 PROCEDURE — 99999 PR PBB SHADOW E&M-EST. PATIENT-LVL III: CPT | Mod: PBBFAC,,, | Performed by: PODIATRIST

## 2023-01-29 NOTE — TELEPHONE ENCOUNTER
Attempted to reach pt, no answer, no voicemail set up, will send [facility] message to discuss an important that is needed due to surgery clearance.   
Patient needs to have surgery. A1c currently >12%. Last seen in [facility] in [place] in 2015. Needs to be seen as soon as possible. Ask if he would like Giuliano Botello or Adity[facility]. He needs to bring logs.   
no radiation

## 2023-02-04 ENCOUNTER — DOCUMENT SCAN (OUTPATIENT)
Dept: HOME HEALTH SERVICES | Facility: HOSPITAL | Age: 58
End: 2023-02-04
Payer: COMMERCIAL

## 2023-02-07 NOTE — PROGRESS NOTES
Subjective:      Patient ID: Billy Rivera is a 57 y.o. male.    Chief Complaint: Wound Care (Bilateral) and Diabetes Mellitus (DRE Baker MD-12/07/2022/PCP - General/)      Billy is a 57 y.o. male who presents to the clinic for evaluation and treatment of high risk feet. Billy has a past medical history of Allergy, CAD (coronary artery disease), native coronary artery (6/25/2013), Cancer, COVID-19 virus detected (9/1/2020), Diabetes mellitus, Diabetes mellitus, type 2, Disorder of kidney and ureter, Heart attack (04/2012), colon cancer, stage I, Hyperlipidemia, Hypertension, Muscular pain, NSTEMI May 2013 - peak troponin 0.22 (5/22/2013), MICHAELA (obstructive sleep apnea), and Retinopathy due to secondary diabetes. The patient's chief complaint is foot ulcer both feet, no new complaints.  He is doing well.    PCP: BRAD Baker MD    Date Last Seen by PCP: 3/23/2022    Current shoe gear:  Affected Foot: DARCO Shoe right foot     Affected Foot: Tennis shoe left foot    History of Trauma: negative  Sign of Infection: none    Hemoglobin A1C   Date Value Ref Range Status   11/10/2022 7.1 (H) 4.0 - 5.6 % Final     Comment:     ADA Screening Guidelines:  5.7-6.4%  Consistent with prediabetes  >or=6.5%  Consistent with diabetes    High levels of fetal hemoglobin interfere with the HbA1C  assay. Heterozygous hemoglobin variants (HbS, HgC, etc)do  not significantly interfere with this assay.   However, presence of multiple variants may affect accuracy.     08/09/2022 8.0 (H) 4.0 - 5.6 % Final     Comment:     ADA Screening Guidelines:  5.7-6.4%  Consistent with prediabetes  >or=6.5%  Consistent with diabetes    High levels of fetal hemoglobin interfere with the HbA1C  assay. Heterozygous hemoglobin variants (HbS, HgC, etc)do  not significantly interfere with this assay.   However, presence of multiple variants may affect accuracy.     06/08/2022 10.0 (H) 4.0 - 5.6 % Final     Comment:     ADA Screening  Guidelines:  5.7-6.4%  Consistent with prediabetes  >or=6.5%  Consistent with diabetes    High levels of fetal hemoglobin interfere with the HbA1C  assay. Heterozygous hemoglobin variants (HbS, HgC, etc)do  not significantly interfere with this assay.   However, presence of multiple variants may affect accuracy.         Review of Systems   Constitutional: Negative for chills, decreased appetite, fever, malaise/fatigue and night sweats.   HENT:  Negative for congestion, hearing loss, nosebleeds and tinnitus.    Eyes:  Negative for double vision, pain, photophobia and visual disturbance.   Cardiovascular:  Negative for chest pain, claudication, cyanosis and leg swelling.   Respiratory:  Negative for cough, hemoptysis, shortness of breath and wheezing.    Endocrine: Negative for cold intolerance and heat intolerance.   Hematologic/Lymphatic: Negative for adenopathy and bleeding problem.   Skin:  Positive for poor wound healing. Negative for color change, dry skin, itching, nail changes, rash and suspicious lesions.   Musculoskeletal:  Negative for arthritis, falls, gout, joint pain, myalgias and stiffness.   Gastrointestinal:  Negative for abdominal pain, jaundice, nausea and vomiting.   Genitourinary:  Negative for dysuria, frequency and hematuria.   Neurological:  Positive for numbness and sensory change. Negative for difficulty with concentration, loss of balance and paresthesias.   Psychiatric/Behavioral:  Negative for altered mental status, hallucinations and suicidal ideas. The patient is not nervous/anxious.    Allergic/Immunologic: Negative for environmental allergies and persistent infections.         Objective:        Physical Exam  Constitutional:       General: He is not in acute distress.     Appearance: Normal appearance. He is well-developed.   HENT:      Head: Normocephalic and atraumatic.   Cardiovascular:      Pulses:           Dorsalis pedis pulses are 2+ on the right side and 2+ on the left side.         Posterior tibial pulses are 2+ on the right side and 2+ on the left side.      Comments: CFT< 3 secs all toes bilateral foot, skin temp warm to cool bilateral foot, no hair growth bilateral lower extremity, no edema to bilateral lower extremities    Pulmonary:      Effort: Pulmonary effort is normal.   Musculoskeletal:         General: No swelling.      Right foot: Normal range of motion. No deformity.      Left foot: Normal range of motion. No deformity.      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     5/5 muscle strength with DF/PF/Inv/Ev bilateral.    Inspection and palpation of the joints and bones reveal no crepitus or joint effusion. No tenderness upon palpation.  Angle and base of gait are normal.    Feet:      Right foot:      Skin integrity: No skin breakdown or erythema.      Left foot:      Skin integrity: No skin breakdown or erythema.   Skin:     General: Skin is warm and dry.      Capillary Refill: Capillary refill takes 2 to 3 seconds.      Findings: No erythema.      Nails: There is no clubbing.      Comments: Ulceration location:  Ulcerations bilateral sub 3rd MPJ right foot measure approximately 0.1 cm in diameter by 0.1 cm in depth post debridement measurements.  Improving.  Hyperkeratotic tissue surrounding.  Left foot is healed.   Neurological:      Mental Status: He is alert and oriented to person, place, and time.      Sensory: Sensory deficit present.      Motor: No weakness.      Deep Tendon Reflexes:      Reflex Scores:       Patellar reflexes are 2+ on the right side and 2+ on the left side.       Achilles reflexes are 2+ on the right side and 2+ on the left side.     Comments: Diminished/loss of protective sensation all toes bilateral to 10 gram monofilament.   Psychiatric:         Mood and Affect: Mood normal.         Behavior: Behavior normal.         Thought Content: Thought content normal.             Assessment:       Encounter  "Diagnoses   Name Primary?    Foot ulcer, left, with fat layer exposed Yes    Type II diabetes mellitus with neurological manifestations     Foot ulcer, right, with fat layer exposed              Plan:       Billy was seen today for wound care and diabetes mellitus.    Diagnoses and all orders for this visit:    Foot ulcer, left, with fat layer exposed    Type II diabetes mellitus with neurological manifestations    Foot ulcer, right, with fat layer exposed        Wound Debridement/post debridement    Performed by: Mingo Melara DPM   Authorized by: Patient    Time out: Immediately prior to procedure a "time out" was called to verify the correct patient, procedure, equipment, support staff and site/side marked as required.   Consent Done?:  Yes (Verbal)  Local anesthesia used?: No       Wound Details:    Location:  right foot     Type of Debridement:  Excisional       Length (cm):  0.1       Width (cm):  0.1       Depth (cm):  0.1       Percent Debrided (%):  100             Depth of debridement:  Subcutaneous tissue    Tissue debrided:  Dermis, Epidermis and Subcutaneous    Devitalized tissue debrided:  Biofilm, Callus and Necrotic/Eschar    Instruments:  Blade, Curette and Nippers     Bleeding:  Minimal  Hemostasis Achieved: Yes    Method Used:  Pressure  Patient tolerance:  Patient tolerated the procedure well with no immediate complications    I counseled the patient on his conditions, their implications and medical management.  The nature of the condition, options for management, as well as potential risks and complications were discussed in detail with patient. Patient was amenable to my recommendations and left my office fully informed and will follow up as instructed or sooner if necessary.    Wound care discussed in detail.  Follow-up in 2 weeks.            "

## 2023-02-08 ENCOUNTER — PATIENT MESSAGE (OUTPATIENT)
Dept: INTERNAL MEDICINE | Facility: CLINIC | Age: 58
End: 2023-02-08
Payer: COMMERCIAL

## 2023-02-08 ENCOUNTER — OFFICE VISIT (OUTPATIENT)
Dept: PODIATRY | Facility: CLINIC | Age: 58
End: 2023-02-08
Payer: COMMERCIAL

## 2023-02-08 VITALS
HEIGHT: 74 IN | SYSTOLIC BLOOD PRESSURE: 128 MMHG | WEIGHT: 276 LBS | DIASTOLIC BLOOD PRESSURE: 86 MMHG | BODY MASS INDEX: 35.42 KG/M2 | HEART RATE: 77 BPM

## 2023-02-08 DIAGNOSIS — E11.49 TYPE II DIABETES MELLITUS WITH NEUROLOGICAL MANIFESTATIONS: ICD-10-CM

## 2023-02-08 DIAGNOSIS — L97.522 FOOT ULCER, LEFT, WITH FAT LAYER EXPOSED: Primary | ICD-10-CM

## 2023-02-08 DIAGNOSIS — L97.512 FOOT ULCER, RIGHT, WITH FAT LAYER EXPOSED: ICD-10-CM

## 2023-02-08 PROCEDURE — 3079F DIAST BP 80-89 MM HG: CPT | Mod: CPTII,S$GLB,, | Performed by: PODIATRIST

## 2023-02-08 PROCEDURE — 1159F MED LIST DOCD IN RCRD: CPT | Mod: CPTII,S$GLB,, | Performed by: PODIATRIST

## 2023-02-08 PROCEDURE — 99999 PR PBB SHADOW E&M-EST. PATIENT-LVL IV: CPT | Mod: PBBFAC,,, | Performed by: PODIATRIST

## 2023-02-08 PROCEDURE — 99999 PR PBB SHADOW E&M-EST. PATIENT-LVL IV: ICD-10-PCS | Mod: PBBFAC,,, | Performed by: PODIATRIST

## 2023-02-08 PROCEDURE — 99024 POSTOP FOLLOW-UP VISIT: CPT | Mod: S$GLB,,, | Performed by: PODIATRIST

## 2023-02-08 PROCEDURE — 1159F PR MEDICATION LIST DOCUMENTED IN MEDICAL RECORD: ICD-10-PCS | Mod: CPTII,S$GLB,, | Performed by: PODIATRIST

## 2023-02-08 PROCEDURE — 99024 PR POST-OP FOLLOW-UP VISIT: ICD-10-PCS | Mod: S$GLB,,, | Performed by: PODIATRIST

## 2023-02-08 PROCEDURE — 3074F PR MOST RECENT SYSTOLIC BLOOD PRESSURE < 130 MM HG: ICD-10-PCS | Mod: CPTII,S$GLB,, | Performed by: PODIATRIST

## 2023-02-08 PROCEDURE — 3008F PR BODY MASS INDEX (BMI) DOCUMENTED: ICD-10-PCS | Mod: CPTII,S$GLB,, | Performed by: PODIATRIST

## 2023-02-08 PROCEDURE — 3079F PR MOST RECENT DIASTOLIC BLOOD PRESSURE 80-89 MM HG: ICD-10-PCS | Mod: CPTII,S$GLB,, | Performed by: PODIATRIST

## 2023-02-08 PROCEDURE — 3074F SYST BP LT 130 MM HG: CPT | Mod: CPTII,S$GLB,, | Performed by: PODIATRIST

## 2023-02-08 PROCEDURE — 3008F BODY MASS INDEX DOCD: CPT | Mod: CPTII,S$GLB,, | Performed by: PODIATRIST

## 2023-02-08 NOTE — TELEPHONE ENCOUNTER
Hello,     Thank you for submitting a referral to the Pharmacy Patient Assistance Team. We have received your referral for Billy Rivera. This patient case has been assigned to LEWIS AVALOS, who will review the case and contact the patient with assistance options. You may review progress in the patients chart through Telephone Encounter notes from Pharmacy Patient Assistance.       Thank you,   Pharmacy Patient Assistance Team

## 2023-02-09 ENCOUNTER — TELEPHONE (OUTPATIENT)
Dept: PHARMACY | Facility: CLINIC | Age: 58
End: 2023-02-09
Payer: COMMERCIAL

## 2023-02-09 NOTE — TELEPHONE ENCOUNTER
Patient was contacted about potential patient assistance program. The patient has private insurance, so he does not qualify for patient assistance program through [employer]. The patient does qualify for a co-pay card  for commercially insured patients may pay as little as $25 for up to 12 pens with savings of up to $150 per month and a yearly savings of $1800. Patient states $150 max monthly would not deduct much from high deductible co-pay with insurance. Patient still needs alternative plan for care.

## 2023-02-09 NOTE — TELEPHONE ENCOUNTER
Pt didn't qualify for the pt assistance program, insurance co pay and deductible is to high, he would like an alternate options in place of Trulicity.

## 2023-02-15 NOTE — PROGRESS NOTES
Subjective:      Patient ID: Billy Rivera is a 57 y.o. male.    Chief Complaint: Wound Check (Right foot) and Diabetes Mellitus (PCP- 08/09/2022/DRE Baker/Andre Powell)      Billy is a 57 y.o. male who presents to the clinic for evaluation and treatment of high risk feet. Billy has a past medical history of Allergy, CAD (coronary artery disease), native coronary artery (6/25/2013), Cancer, COVID-19 virus detected (9/1/2020), Diabetes mellitus, Diabetes mellitus, type 2, Disorder of kidney and ureter, Heart attack (04/2012), colon cancer, stage I, Hyperlipidemia, Hypertension, Muscular pain, NSTEMI May 2013 - peak troponin 0.22 (5/22/2013), MICHAELA (obstructive sleep apnea), and Retinopathy due to secondary diabetes. The patient's chief complaint is foot ulcer both feet, no new complaints.    PCP: BRAD Baker MD    Date Last Seen by PCP: 3/23/2022    Current shoe gear:  Affected Foot: DARCO Shoe right foot     Affected Foot: Tennis shoe left foot    History of Trauma: negative  Sign of Infection: none    Hemoglobin A1C   Date Value Ref Range Status   11/10/2022 7.1 (H) 4.0 - 5.6 % Final     Comment:     ADA Screening Guidelines:  5.7-6.4%  Consistent with prediabetes  >or=6.5%  Consistent with diabetes    High levels of fetal hemoglobin interfere with the HbA1C  assay. Heterozygous hemoglobin variants (HbS, HgC, etc)do  not significantly interfere with this assay.   However, presence of multiple variants may affect accuracy.     08/09/2022 8.0 (H) 4.0 - 5.6 % Final     Comment:     ADA Screening Guidelines:  5.7-6.4%  Consistent with prediabetes  >or=6.5%  Consistent with diabetes    High levels of fetal hemoglobin interfere with the HbA1C  assay. Heterozygous hemoglobin variants (HbS, HgC, etc)do  not significantly interfere with this assay.   However, presence of multiple variants may affect accuracy.     06/08/2022 10.0 (H) 4.0 - 5.6 % Final     Comment:     ADA Screening  Guidelines:  5.7-6.4%  Consistent with prediabetes  >or=6.5%  Consistent with diabetes    High levels of fetal hemoglobin interfere with the HbA1C  assay. Heterozygous hemoglobin variants (HbS, HgC, etc)do  not significantly interfere with this assay.   However, presence of multiple variants may affect accuracy.         Review of Systems   Constitutional: Negative for chills, decreased appetite, fever, malaise/fatigue and night sweats.   HENT:  Negative for congestion, hearing loss, nosebleeds and tinnitus.    Eyes:  Negative for double vision, pain, photophobia and visual disturbance.   Cardiovascular:  Negative for chest pain, claudication, cyanosis and leg swelling.   Respiratory:  Negative for cough, hemoptysis, shortness of breath and wheezing.    Endocrine: Negative for cold intolerance and heat intolerance.   Hematologic/Lymphatic: Negative for adenopathy and bleeding problem.   Skin:  Positive for poor wound healing. Negative for color change, dry skin, itching, nail changes, rash and suspicious lesions.   Musculoskeletal:  Negative for arthritis, falls, gout, joint pain, myalgias and stiffness.   Gastrointestinal:  Negative for abdominal pain, jaundice, nausea and vomiting.   Genitourinary:  Negative for dysuria, frequency and hematuria.   Neurological:  Positive for numbness and sensory change. Negative for difficulty with concentration, loss of balance and paresthesias.   Psychiatric/Behavioral:  Negative for altered mental status, hallucinations and suicidal ideas. The patient is not nervous/anxious.    Allergic/Immunologic: Negative for environmental allergies and persistent infections.         Objective:        Physical Exam  Constitutional:       General: He is not in acute distress.     Appearance: Normal appearance. He is well-developed.   HENT:      Head: Normocephalic and atraumatic.   Cardiovascular:      Pulses:           Dorsalis pedis pulses are 2+ on the right side and 2+ on the left side.         Posterior tibial pulses are 2+ on the right side and 2+ on the left side.      Comments: CFT< 3 secs all toes bilateral foot, skin temp warm to cool bilateral foot, no hair growth bilateral lower extremity, no edema to bilateral lower extremities    Pulmonary:      Effort: Pulmonary effort is normal.   Musculoskeletal:         General: No swelling.      Right foot: Normal range of motion. No deformity.      Left foot: Normal range of motion. No deformity.      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     5/5 muscle strength with DF/PF/Inv/Ev bilateral.    Inspection and palpation of the joints and bones reveal no crepitus or joint effusion. No tenderness upon palpation.  Angle and base of gait are normal.    Feet:      Right foot:      Skin integrity: No skin breakdown or erythema.      Left foot:      Skin integrity: No skin breakdown or erythema.   Skin:     General: Skin is warm and dry.      Capillary Refill: Capillary refill takes 2 to 3 seconds.      Findings: No erythema.      Nails: There is no clubbing.      Comments: Ulceration location:  Ulcerations bilateral sub 3rd MPJ right foot measure approximately 0.1 cm in diameter by 0.1 cm in depth post debridement measurements.  Improving.  Hyperkeratotic tissue surrounding.  Left foot is healed.   Neurological:      Mental Status: He is alert and oriented to person, place, and time.      Sensory: Sensory deficit present.      Motor: No weakness.      Deep Tendon Reflexes:      Reflex Scores:       Patellar reflexes are 2+ on the right side and 2+ on the left side.       Achilles reflexes are 2+ on the right side and 2+ on the left side.     Comments: Diminished/loss of protective sensation all toes bilateral to 10 gram monofilament.   Psychiatric:         Mood and Affect: Mood normal.         Behavior: Behavior normal.         Thought Content: Thought content normal.             Assessment:       No diagnosis  "found.            Plan:       There are no diagnoses linked to this encounter.        Wound Debridement/post debridement    Performed by: Mingo Melara DPM   Authorized by: Patient    Time out: Immediately prior to procedure a "time out" was called to verify the correct patient, procedure, equipment, support staff and site/side marked as required.   Consent Done?:  Yes (Verbal)  Local anesthesia used?: No       Wound Details:    Location:  right foot     Type of Debridement:  Excisional       Length (cm):  0.1       Width (cm):  0.1       Depth (cm):  0.1       Percent Debrided (%):  100             Depth of debridement:  Subcutaneous tissue    Tissue debrided:  Dermis, Epidermis and Subcutaneous    Devitalized tissue debrided:  Biofilm, Callus and Necrotic/Eschar    Instruments:  Blade, Curette and Nippers     Bleeding:  Minimal  Hemostasis Achieved: Yes    Method Used:  Pressure  Patient tolerance:  Patient tolerated the procedure well with no immediate complications    I counseled the patient on his conditions, their implications and medical management.  The nature of the condition, options for management, as well as potential risks and complications were discussed in detail with patient. Patient was amenable to my recommendations and left my office fully informed and will follow up as instructed or sooner if necessary.    Wound care discussed in detail.  Follow-up in 2 weeks.              "

## 2023-03-01 ENCOUNTER — OFFICE VISIT (OUTPATIENT)
Dept: PODIATRY | Facility: CLINIC | Age: 58
End: 2023-03-01
Payer: COMMERCIAL

## 2023-03-01 VITALS
WEIGHT: 287.94 LBS | DIASTOLIC BLOOD PRESSURE: 93 MMHG | HEART RATE: 66 BPM | SYSTOLIC BLOOD PRESSURE: 150 MMHG | BODY MASS INDEX: 36.95 KG/M2 | HEIGHT: 74 IN

## 2023-03-01 DIAGNOSIS — L97.522 FOOT ULCER, LEFT, WITH FAT LAYER EXPOSED: Primary | ICD-10-CM

## 2023-03-01 DIAGNOSIS — L97.512 FOOT ULCER, RIGHT, WITH FAT LAYER EXPOSED: ICD-10-CM

## 2023-03-01 DIAGNOSIS — E11.49 TYPE II DIABETES MELLITUS WITH NEUROLOGICAL MANIFESTATIONS: ICD-10-CM

## 2023-03-01 PROCEDURE — 99213 PR OFFICE/OUTPT VISIT, EST, LEVL III, 20-29 MIN: ICD-10-PCS | Mod: 25,S$GLB,, | Performed by: PODIATRIST

## 2023-03-01 PROCEDURE — 3008F BODY MASS INDEX DOCD: CPT | Mod: CPTII,S$GLB,, | Performed by: PODIATRIST

## 2023-03-01 PROCEDURE — 3080F DIAST BP >= 90 MM HG: CPT | Mod: CPTII,S$GLB,, | Performed by: PODIATRIST

## 2023-03-01 PROCEDURE — 11042 PR DEBRIDEMENT, SKIN, SUB-Q TISSUE,=<20 SQ CM: ICD-10-PCS | Mod: S$GLB,,, | Performed by: PODIATRIST

## 2023-03-01 PROCEDURE — 99999 PR PBB SHADOW E&M-EST. PATIENT-LVL IV: ICD-10-PCS | Mod: PBBFAC,,, | Performed by: PODIATRIST

## 2023-03-01 PROCEDURE — 4010F PR ACE/ARB THEARPY RXD/TAKEN: ICD-10-PCS | Mod: CPTII,S$GLB,, | Performed by: PODIATRIST

## 2023-03-01 PROCEDURE — 3077F PR MOST RECENT SYSTOLIC BLOOD PRESSURE >= 140 MM HG: ICD-10-PCS | Mod: CPTII,S$GLB,, | Performed by: PODIATRIST

## 2023-03-01 PROCEDURE — 11042 DBRDMT SUBQ TIS 1ST 20SQCM/<: CPT | Mod: S$GLB,,, | Performed by: PODIATRIST

## 2023-03-01 PROCEDURE — 1159F MED LIST DOCD IN RCRD: CPT | Mod: CPTII,S$GLB,, | Performed by: PODIATRIST

## 2023-03-01 PROCEDURE — 4010F ACE/ARB THERAPY RXD/TAKEN: CPT | Mod: CPTII,S$GLB,, | Performed by: PODIATRIST

## 2023-03-01 PROCEDURE — 99213 OFFICE O/P EST LOW 20 MIN: CPT | Mod: 25,S$GLB,, | Performed by: PODIATRIST

## 2023-03-01 PROCEDURE — 3080F PR MOST RECENT DIASTOLIC BLOOD PRESSURE >= 90 MM HG: ICD-10-PCS | Mod: CPTII,S$GLB,, | Performed by: PODIATRIST

## 2023-03-01 PROCEDURE — 99999 PR PBB SHADOW E&M-EST. PATIENT-LVL IV: CPT | Mod: PBBFAC,,, | Performed by: PODIATRIST

## 2023-03-01 PROCEDURE — 1159F PR MEDICATION LIST DOCUMENTED IN MEDICAL RECORD: ICD-10-PCS | Mod: CPTII,S$GLB,, | Performed by: PODIATRIST

## 2023-03-01 PROCEDURE — 3077F SYST BP >= 140 MM HG: CPT | Mod: CPTII,S$GLB,, | Performed by: PODIATRIST

## 2023-03-01 PROCEDURE — 3008F PR BODY MASS INDEX (BMI) DOCUMENTED: ICD-10-PCS | Mod: CPTII,S$GLB,, | Performed by: PODIATRIST

## 2023-03-10 NOTE — PROGRESS NOTES
Subjective:      Patient ID: Billy Rivera is a 57 y.o. male.    Chief Complaint: Wound Care      Billy is a 57 y.o. male who presents to the clinic for evaluation and treatment of high risk feet. Billy has a past medical history of Allergy, CAD (coronary artery disease), native coronary artery (6/25/2013), Cancer, COVID-19 virus detected (9/1/2020), Diabetes mellitus, Diabetes mellitus, type 2, Disorder of kidney and ureter, Heart attack (04/2012), colon cancer, stage I, Hyperlipidemia, Hypertension, Muscular pain, NSTEMI May 2013 - peak troponin 0.22 (5/22/2013), MICHAELA (obstructive sleep apnea), and Retinopathy due to secondary diabetes. The patient's chief complaint is foot ulcer both feet, no new complaints.    PCP: BRAD Baker MD    Date Last Seen by PCP: 3/23/2022    Current shoe gear:  Affected Foot: DARCO Shoe right foot     Affected Foot: Tennis shoe left foot    History of Trauma: negative  Sign of Infection: none    Hemoglobin A1C   Date Value Ref Range Status   11/10/2022 7.1 (H) 4.0 - 5.6 % Final     Comment:     ADA Screening Guidelines:  5.7-6.4%  Consistent with prediabetes  >or=6.5%  Consistent with diabetes    High levels of fetal hemoglobin interfere with the HbA1C  assay. Heterozygous hemoglobin variants (HbS, HgC, etc)do  not significantly interfere with this assay.   However, presence of multiple variants may affect accuracy.     08/09/2022 8.0 (H) 4.0 - 5.6 % Final     Comment:     ADA Screening Guidelines:  5.7-6.4%  Consistent with prediabetes  >or=6.5%  Consistent with diabetes    High levels of fetal hemoglobin interfere with the HbA1C  assay. Heterozygous hemoglobin variants (HbS, HgC, etc)do  not significantly interfere with this assay.   However, presence of multiple variants may affect accuracy.     06/08/2022 10.0 (H) 4.0 - 5.6 % Final     Comment:     ADA Screening Guidelines:  5.7-6.4%  Consistent with prediabetes  >or=6.5%  Consistent with diabetes    High levels  of fetal hemoglobin interfere with the HbA1C  assay. Heterozygous hemoglobin variants (HbS, HgC, etc)do  not significantly interfere with this assay.   However, presence of multiple variants may affect accuracy.         Review of Systems   Constitutional: Negative for chills, decreased appetite, fever, malaise/fatigue and night sweats.   HENT:  Negative for congestion, hearing loss, nosebleeds and tinnitus.    Eyes:  Negative for double vision, pain, photophobia and visual disturbance.   Cardiovascular:  Negative for chest pain, claudication, cyanosis and leg swelling.   Respiratory:  Negative for cough, hemoptysis, shortness of breath and wheezing.    Endocrine: Negative for cold intolerance and heat intolerance.   Hematologic/Lymphatic: Negative for adenopathy and bleeding problem.   Skin:  Positive for poor wound healing. Negative for color change, dry skin, itching, nail changes, rash and suspicious lesions.   Musculoskeletal:  Negative for arthritis, falls, gout, joint pain, myalgias and stiffness.   Gastrointestinal:  Negative for abdominal pain, jaundice, nausea and vomiting.   Genitourinary:  Negative for dysuria, frequency and hematuria.   Neurological:  Positive for numbness and sensory change. Negative for difficulty with concentration, loss of balance and paresthesias.   Psychiatric/Behavioral:  Negative for altered mental status, hallucinations and suicidal ideas. The patient is not nervous/anxious.    Allergic/Immunologic: Negative for environmental allergies and persistent infections.         Objective:        Physical Exam  Constitutional:       General: He is not in acute distress.     Appearance: Normal appearance. He is well-developed.   HENT:      Head: Normocephalic and atraumatic.   Cardiovascular:      Pulses:           Dorsalis pedis pulses are 2+ on the right side and 2+ on the left side.        Posterior tibial pulses are 2+ on the right side and 2+ on the left side.      Comments: CFT< 3  secs all toes bilateral foot, skin temp warm to cool bilateral foot, no hair growth bilateral lower extremity, no edema to bilateral lower extremities    Pulmonary:      Effort: Pulmonary effort is normal.   Musculoskeletal:         General: No swelling.      Right foot: Normal range of motion. No deformity.      Left foot: Normal range of motion. No deformity.      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     5/5 muscle strength with DF/PF/Inv/Ev bilateral.    Inspection and palpation of the joints and bones reveal no crepitus or joint effusion. No tenderness upon palpation.  Angle and base of gait are normal.    Feet:      Right foot:      Skin integrity: No skin breakdown or erythema.      Left foot:      Skin integrity: No skin breakdown or erythema.   Skin:     General: Skin is warm and dry.      Capillary Refill: Capillary refill takes 2 to 3 seconds.      Findings: No erythema.      Nails: There is no clubbing.      Comments: Ulceration location:  Ulcerations bilateral sub 3rd MPJ right foot measure approximately 0.1 cm in diameter by 0.1 cm in depth post debridement measurements.  Improving.  Hyperkeratotic tissue surrounding.  Left foot is healed.   Neurological:      Mental Status: He is alert and oriented to person, place, and time.      Sensory: Sensory deficit present.      Motor: No weakness.      Deep Tendon Reflexes:      Reflex Scores:       Patellar reflexes are 2+ on the right side and 2+ on the left side.       Achilles reflexes are 2+ on the right side and 2+ on the left side.     Comments: Diminished/loss of protective sensation all toes bilateral to 10 gram monofilament.   Psychiatric:         Mood and Affect: Mood normal.         Behavior: Behavior normal.         Thought Content: Thought content normal.     Wound size unchanged compared to last visit.        Assessment:       Encounter Diagnoses   Name Primary?    Foot ulcer, left, with fat  "layer exposed Yes    Type II diabetes mellitus with neurological manifestations     Foot ulcer, right, with fat layer exposed                Plan:       Billy was seen today for wound care.    Diagnoses and all orders for this visit:    Foot ulcer, left, with fat layer exposed    Type II diabetes mellitus with neurological manifestations    Foot ulcer, right, with fat layer exposed          Wound Debridement/post debridement    Performed by: Mingo Melara DPM   Authorized by: Patient    Time out: Immediately prior to procedure a "time out" was called to verify the correct patient, procedure, equipment, support staff and site/side marked as required.   Consent Done?:  Yes (Verbal)  Local anesthesia used?: No       Wound Details:    Location:  right foot     Type of Debridement:  Excisional       Length (cm):  0.1       Width (cm):  0.1       Depth (cm):  0.1       Percent Debrided (%):  100             Depth of debridement:  Subcutaneous tissue    Tissue debrided:  Dermis, Epidermis and Subcutaneous    Devitalized tissue debrided:  Biofilm, Callus and Necrotic/Eschar    Instruments:  Blade, Curette and Nippers     Bleeding:  Minimal  Hemostasis Achieved: Yes    Method Used:  Pressure  Patient tolerance:  Patient tolerated the procedure well with no immediate complications    I counseled the patient on his conditions, their implications and medical management.  The nature of the condition, options for management, as well as potential risks and complications were discussed in detail with patient. Patient was amenable to my recommendations and left my office fully informed and will follow up as instructed or sooner if necessary.    Wound care discussed in detail.  Follow-up in 2 weeks.                "

## 2023-03-15 ENCOUNTER — OFFICE VISIT (OUTPATIENT)
Dept: PODIATRY | Facility: CLINIC | Age: 58
End: 2023-03-15
Payer: COMMERCIAL

## 2023-03-15 VITALS
BODY MASS INDEX: 37.53 KG/M2 | SYSTOLIC BLOOD PRESSURE: 126 MMHG | HEART RATE: 53 BPM | DIASTOLIC BLOOD PRESSURE: 76 MMHG | WEIGHT: 292.44 LBS | HEIGHT: 74 IN

## 2023-03-15 DIAGNOSIS — E11.49 TYPE II DIABETES MELLITUS WITH NEUROLOGICAL MANIFESTATIONS: ICD-10-CM

## 2023-03-15 DIAGNOSIS — L84 CORN OR CALLUS: ICD-10-CM

## 2023-03-15 DIAGNOSIS — L97.522 FOOT ULCER, LEFT, WITH FAT LAYER EXPOSED: Primary | ICD-10-CM

## 2023-03-15 PROCEDURE — 99999 PR PBB SHADOW E&M-EST. PATIENT-LVL IV: ICD-10-PCS | Mod: PBBFAC,,, | Performed by: PODIATRIST

## 2023-03-15 PROCEDURE — 4010F ACE/ARB THERAPY RXD/TAKEN: CPT | Mod: CPTII,S$GLB,, | Performed by: PODIATRIST

## 2023-03-15 PROCEDURE — 3074F PR MOST RECENT SYSTOLIC BLOOD PRESSURE < 130 MM HG: ICD-10-PCS | Mod: CPTII,S$GLB,, | Performed by: PODIATRIST

## 2023-03-15 PROCEDURE — 11042 DBRDMT SUBQ TIS 1ST 20SQCM/<: CPT | Mod: 59,S$GLB,, | Performed by: PODIATRIST

## 2023-03-15 PROCEDURE — 99999 PR PBB SHADOW E&M-EST. PATIENT-LVL IV: CPT | Mod: PBBFAC,,, | Performed by: PODIATRIST

## 2023-03-15 PROCEDURE — 11721 DEBRIDE NAIL 6 OR MORE: CPT | Mod: 59,S$GLB,, | Performed by: PODIATRIST

## 2023-03-15 PROCEDURE — 11721 PR DEBRIDEMENT OF NAILS, 6 OR MORE: ICD-10-PCS | Mod: 59,S$GLB,, | Performed by: PODIATRIST

## 2023-03-15 PROCEDURE — 99213 PR OFFICE/OUTPT VISIT, EST, LEVL III, 20-29 MIN: ICD-10-PCS | Mod: 25,S$GLB,, | Performed by: PODIATRIST

## 2023-03-15 PROCEDURE — 3078F DIAST BP <80 MM HG: CPT | Mod: CPTII,S$GLB,, | Performed by: PODIATRIST

## 2023-03-15 PROCEDURE — 99213 OFFICE O/P EST LOW 20 MIN: CPT | Mod: 25,S$GLB,, | Performed by: PODIATRIST

## 2023-03-15 PROCEDURE — 1159F MED LIST DOCD IN RCRD: CPT | Mod: CPTII,S$GLB,, | Performed by: PODIATRIST

## 2023-03-15 PROCEDURE — 3074F SYST BP LT 130 MM HG: CPT | Mod: CPTII,S$GLB,, | Performed by: PODIATRIST

## 2023-03-15 PROCEDURE — 3008F PR BODY MASS INDEX (BMI) DOCUMENTED: ICD-10-PCS | Mod: CPTII,S$GLB,, | Performed by: PODIATRIST

## 2023-03-15 PROCEDURE — 11056 PR TRIM BENIGN HYPERKERATOTIC SKIN LESION,2-4: ICD-10-PCS | Mod: S$GLB,,, | Performed by: PODIATRIST

## 2023-03-15 PROCEDURE — 3008F BODY MASS INDEX DOCD: CPT | Mod: CPTII,S$GLB,, | Performed by: PODIATRIST

## 2023-03-15 PROCEDURE — 3078F PR MOST RECENT DIASTOLIC BLOOD PRESSURE < 80 MM HG: ICD-10-PCS | Mod: CPTII,S$GLB,, | Performed by: PODIATRIST

## 2023-03-15 PROCEDURE — 4010F PR ACE/ARB THEARPY RXD/TAKEN: ICD-10-PCS | Mod: CPTII,S$GLB,, | Performed by: PODIATRIST

## 2023-03-15 PROCEDURE — 11056 PARNG/CUTG B9 HYPRKR LES 2-4: CPT | Mod: S$GLB,,, | Performed by: PODIATRIST

## 2023-03-15 PROCEDURE — 11042 PR DEBRIDEMENT, SKIN, SUB-Q TISSUE,=<20 SQ CM: ICD-10-PCS | Mod: 59,S$GLB,, | Performed by: PODIATRIST

## 2023-03-15 PROCEDURE — 1159F PR MEDICATION LIST DOCUMENTED IN MEDICAL RECORD: ICD-10-PCS | Mod: CPTII,S$GLB,, | Performed by: PODIATRIST

## 2023-03-16 ENCOUNTER — TELEPHONE (OUTPATIENT)
Dept: INTERNAL MEDICINE | Facility: CLINIC | Age: 58
End: 2023-03-16

## 2023-03-16 DIAGNOSIS — E11.3393 TYPE 2 DIABETES MELLITUS WITH BOTH EYES AFFECTED BY MODERATE NONPROLIFERATIVE RETINOPATHY WITHOUT MACULAR EDEMA, WITH LONG-TERM CURRENT USE OF INSULIN: Primary | ICD-10-CM

## 2023-03-16 DIAGNOSIS — Z79.4 TYPE 2 DIABETES MELLITUS WITH BOTH EYES AFFECTED BY MODERATE NONPROLIFERATIVE RETINOPATHY WITHOUT MACULAR EDEMA, WITH LONG-TERM CURRENT USE OF INSULIN: Primary | ICD-10-CM

## 2023-03-16 RX ORDER — DAPAGLIFLOZIN 5 MG/1
5 TABLET, FILM COATED ORAL DAILY
Qty: 30 TABLET | Refills: 4 | Status: SHIPPED | OUTPATIENT
Start: 2023-03-16 | End: 2023-04-06 | Stop reason: SDUPTHER

## 2023-03-16 NOTE — TELEPHONE ENCOUNTER
Pt states bs has been running up and down , it went down to 260 as of now , he says his insurance wont cover trulicity so he has been off of his diabetic medication . I asked pt if he is seeing someone for his dm he says yes jessi but she canceled last appointment . I instructed the patient to give jessi's office a call since she is back . pt ace .

## 2023-03-16 NOTE — TELEPHONE ENCOUNTER
----- Message from Madeline Dhillon sent at 3/16/2023  8:40 AM CDT -----  Contact: [phone]  Pts wife is stating the pts sugar is hig     Symptom: High Blood Sugar - Caller Reports  Outcome: Transfer to a nurse or provider NOW!  Reason: Known blood sugar above 300

## 2023-03-16 NOTE — TELEPHONE ENCOUNTER
Sent in refill for farxiga.   I need to see him for OV, and labs prior -   Looks like home health has been drawing his lab?   I put in a1c, cmp, lipid panel - let me know if you need it to be external and I can change if need be.   Thanks,  Akua

## 2023-03-21 NOTE — PROGRESS NOTES
Subjective:      Patient ID: Billy Rivera is a 57 y.o. male.    Chief Complaint: Wound Care      Billy is a 57 y.o. male who presents to the clinic for evaluation and treatment of high risk feet. Billy has a past medical history of Allergy, CAD (coronary artery disease), native coronary artery (6/25/2013), Cancer, COVID-19 virus detected (9/1/2020), Diabetes mellitus, Diabetes mellitus, type 2, Disorder of kidney and ureter, Heart attack (04/2012), colon cancer, stage I, Hyperlipidemia, Hypertension, Muscular pain, NSTEMI May 2013 - peak troponin 0.22 (5/22/2013), MICHAELA (obstructive sleep apnea), and Retinopathy due to secondary diabetes. The patient's chief complaint is foot ulcer both feet, no new complaints.  Addition he is here for routine diabetic foot evaluation education and routine foot care.  PCP: BRAD Baker MD    Date Last Seen by PCP: 3/23/2022    Current shoe gear:  Affected Foot: DARCO Shoe right foot     Affected Foot: Tennis shoe left foot    History of Trauma: negative  Sign of Infection: none    Hemoglobin A1C   Date Value Ref Range Status   11/10/2022 7.1 (H) 4.0 - 5.6 % Final     Comment:     ADA Screening Guidelines:  5.7-6.4%  Consistent with prediabetes  >or=6.5%  Consistent with diabetes    High levels of fetal hemoglobin interfere with the HbA1C  assay. Heterozygous hemoglobin variants (HbS, HgC, etc)do  not significantly interfere with this assay.   However, presence of multiple variants may affect accuracy.     08/09/2022 8.0 (H) 4.0 - 5.6 % Final     Comment:     ADA Screening Guidelines:  5.7-6.4%  Consistent with prediabetes  >or=6.5%  Consistent with diabetes    High levels of fetal hemoglobin interfere with the HbA1C  assay. Heterozygous hemoglobin variants (HbS, HgC, etc)do  not significantly interfere with this assay.   However, presence of multiple variants may affect accuracy.     06/08/2022 10.0 (H) 4.0 - 5.6 % Final     Comment:     ADA Screening  Guidelines:  5.7-6.4%  Consistent with prediabetes  >or=6.5%  Consistent with diabetes    High levels of fetal hemoglobin interfere with the HbA1C  assay. Heterozygous hemoglobin variants (HbS, HgC, etc)do  not significantly interfere with this assay.   However, presence of multiple variants may affect accuracy.         Review of Systems   Constitutional: Negative for chills, decreased appetite, fever, malaise/fatigue and night sweats.   HENT:  Negative for congestion, hearing loss, nosebleeds and tinnitus.    Eyes:  Negative for double vision, pain, photophobia and visual disturbance.   Cardiovascular:  Negative for chest pain, claudication, cyanosis and leg swelling.   Respiratory:  Negative for cough, hemoptysis, shortness of breath and wheezing.    Endocrine: Negative for cold intolerance and heat intolerance.   Hematologic/Lymphatic: Negative for adenopathy and bleeding problem.   Skin:  Positive for color change, dry skin, nail changes, poor wound healing and suspicious lesions. Negative for itching and rash.   Musculoskeletal:  Negative for arthritis, falls, gout, joint pain, myalgias and stiffness.   Gastrointestinal:  Negative for abdominal pain, jaundice, nausea and vomiting.   Genitourinary:  Negative for dysuria, frequency and hematuria.   Neurological:  Positive for numbness and sensory change. Negative for difficulty with concentration, loss of balance and paresthesias.   Psychiatric/Behavioral:  Negative for altered mental status, hallucinations and suicidal ideas. The patient is not nervous/anxious.    Allergic/Immunologic: Negative for environmental allergies and persistent infections.         Objective:        Physical Exam  Constitutional:       General: He is not in acute distress.     Appearance: Normal appearance. He is well-developed.   HENT:      Head: Normocephalic and atraumatic.   Cardiovascular:      Pulses:           Dorsalis pedis pulses are 2+ on the right side and 2+ on the left side.         Posterior tibial pulses are 2+ on the right side and 2+ on the left side.      Comments: CFT< 3 secs all toes bilateral foot, skin temp warm to cool bilateral foot, no hair growth bilateral lower extremity, no edema to bilateral lower extremities    Pulmonary:      Effort: Pulmonary effort is normal.   Musculoskeletal:         General: No swelling.      Right foot: Normal range of motion. No deformity.      Left foot: Normal range of motion. No deformity.      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     5/5 muscle strength with DF/PF/Inv/Ev bilateral.    Inspection and palpation of the joints and bones reveal no crepitus or joint effusion. No tenderness upon palpation.  Angle and base of gait are normal.    Feet:      Right foot:      Skin integrity: No skin breakdown or erythema.      Left foot:      Skin integrity: No skin breakdown or erythema.   Skin:     General: Skin is warm and dry.      Capillary Refill: Capillary refill takes 2 to 3 seconds.      Findings: No erythema.      Nails: There is no clubbing.      Comments: Ulceration location:  Ulcerations bilateral sub 3rd MPJ right foot measure approximately 0.1 cm in diameter by 0.1 cm in depth post debridement measurements.  Improving.  Hyperkeratotic tissue surrounding.  Left foot is healed.  Long, thickened, discolored  toenails 1-5 with subungual debris  Hyperkeratotic tissue/callus formation with subepidermal hemorrhaging noted to bilateral sub 1st MPJ.   Neurological:      Mental Status: He is alert and oriented to person, place, and time.      Sensory: Sensory deficit present.      Motor: No weakness.      Deep Tendon Reflexes:      Reflex Scores:       Patellar reflexes are 2+ on the right side and 2+ on the left side.       Achilles reflexes are 2+ on the right side and 2+ on the left side.     Comments: Diminished/loss of protective sensation all toes bilateral to 10 gram monofilament.  "  Psychiatric:         Mood and Affect: Mood normal.         Behavior: Behavior normal.         Thought Content: Thought content normal.     Wound size unchanged compared to last visit.        Assessment:       Encounter Diagnoses   Name Primary?    Foot ulcer, left, with fat layer exposed Yes    Type II diabetes mellitus with neurological manifestations     Corn or callus                Plan:       Billy was seen today for wound care.    Diagnoses and all orders for this visit:    Foot ulcer, left, with fat layer exposed  -     Cancel: DIABETIC SHOES FOR HOME USE  -     DIABETIC SHOES FOR HOME USE    Type II diabetes mellitus with neurological manifestations  -     Cancel: DIABETIC SHOES FOR HOME USE  -     DIABETIC SHOES FOR HOME USE    Corn or callus  -     Cancel: DIABETIC SHOES FOR HOME USE  -     DIABETIC SHOES FOR HOME USE          Wound Debridement/post debridement    Performed by: Mingo Melara DPM   Authorized by: Patient    Time out: Immediately prior to procedure a "time out" was called to verify the correct patient, procedure, equipment, support staff and site/side marked as required.   Consent Done?:  Yes (Verbal)  Local anesthesia used?: No       Wound Details:    Location:  right foot     Type of Debridement:  Excisional       Length (cm):  0.1       Width (cm):  0.1       Depth (cm):  0.1       Percent Debrided (%):  100             Depth of debridement:  Subcutaneous tissue    Tissue debrided:  Dermis, Epidermis and Subcutaneous    Devitalized tissue debrided:  Biofilm, Callus and Necrotic/Eschar    Instruments:  Blade, Curette and Nippers     Bleeding:  Minimal  Hemostasis Achieved: Yes    Method Used:  Pressure  Patient tolerance:  Patient tolerated the procedure well with no immediate complications    Routine Foot Care    Performed by:  Mingo Melara. DPM  Authorized by:  Patient     Consent Done?:  Yes (Verbal)     Nail Care Type:  Debride  Location(s): All  (Left 1st Toe, Left 3rd " Toe, Left 2nd Toe, Left 4th Toe, Left 5th Toe, Right 1st Toe, Right 2nd Toe, Right 3rd Toe, Right 4th Toe and Right 5th Toe)  Patient tolerance:  Patient tolerated the procedure well with no immediate complications     With patient's permission, the toenails mentioned above were aggressively reduced and debrided using a nail nipper, removing all offending nail and debris. The patient will continue to monitor the areas daily, inspect the feet, wear protective shoe gear when ambulatory, and moisturizer to maintain skin integrity.      Callus Care Type: Debride    With patient's permission, the calluses/hyperkeratotic lesions mentioned above were aggressively reduced and debrided using a number 15 blade. The patient will continue to monitor the areas daily, inspect the feet, wear protective shoe gear when ambulatory, and moisturizer to maintain skin integrity.       I counseled the patient on his conditions, their implications and medical management.    The nature of the condition, options for management, as well as potential risks and complications were discussed in detail with patient. Patient was amenable to my recommendations and left my office fully informed and will follow up as instructed or sooner if necessary.      Wound care discussed in detail.  Follow-up in 2 weeks.

## 2023-03-29 ENCOUNTER — PATIENT OUTREACH (OUTPATIENT)
Dept: ADMINISTRATIVE | Facility: HOSPITAL | Age: 58
End: 2023-03-29

## 2023-03-29 NOTE — PROGRESS NOTES
Health Maintenance Due   Topic Date Due    Eye Exam  08/15/2020    Shingles Vaccine (2 of 2) 11/23/2020    COVID-19 Vaccine (3 - Booster for Moderna series) 06/23/2021    PROSTATE-SPECIFIC ANTIGEN  01/26/2023     Chart reviewed.   Immunizations: Reconciled  Orders placed: N/A  Upcoming appts to satisfy [facility] topics: N/A

## 2023-04-05 ENCOUNTER — TELEPHONE (OUTPATIENT)
Dept: PODIATRY | Facility: CLINIC | Age: 58
End: 2023-04-05
Payer: COMMERCIAL

## 2023-04-06 ENCOUNTER — OFFICE VISIT (OUTPATIENT)
Dept: INTERNAL MEDICINE | Facility: CLINIC | Age: 58
End: 2023-04-06
Payer: COMMERCIAL

## 2023-04-06 ENCOUNTER — LAB VISIT (OUTPATIENT)
Dept: LAB | Facility: HOSPITAL | Age: 58
End: 2023-04-06
Attending: NURSE PRACTITIONER
Payer: COMMERCIAL

## 2023-04-06 VITALS
OXYGEN SATURATION: 97 % | WEIGHT: 285.69 LBS | HEIGHT: 74 IN | TEMPERATURE: 97 F | BODY MASS INDEX: 36.67 KG/M2 | SYSTOLIC BLOOD PRESSURE: 120 MMHG | DIASTOLIC BLOOD PRESSURE: 80 MMHG | HEART RATE: 77 BPM | RESPIRATION RATE: 15 BRPM

## 2023-04-06 DIAGNOSIS — E66.01 CLASS 2 SEVERE OBESITY WITH BODY MASS INDEX (BMI) OF 35 TO 39.9 WITH SERIOUS COMORBIDITY: ICD-10-CM

## 2023-04-06 DIAGNOSIS — E11.51 TYPE 2 DIABETES MELLITUS WITH DIABETIC PERIPHERAL ANGIOPATHY WITHOUT GANGRENE, WITH LONG-TERM CURRENT USE OF INSULIN: Primary | Chronic | ICD-10-CM

## 2023-04-06 DIAGNOSIS — Z79.4 TYPE 2 DIABETES MELLITUS WITH DIABETIC PERIPHERAL ANGIOPATHY WITHOUT GANGRENE, WITH LONG-TERM CURRENT USE OF INSULIN: Primary | Chronic | ICD-10-CM

## 2023-04-06 DIAGNOSIS — E11.3393 TYPE 2 DIABETES MELLITUS WITH BOTH EYES AFFECTED BY MODERATE NONPROLIFERATIVE RETINOPATHY WITHOUT MACULAR EDEMA, WITH LONG-TERM CURRENT USE OF INSULIN: ICD-10-CM

## 2023-04-06 DIAGNOSIS — Z79.4 TYPE 2 DIABETES MELLITUS WITH BOTH EYES AFFECTED BY MODERATE NONPROLIFERATIVE RETINOPATHY WITHOUT MACULAR EDEMA, WITH LONG-TERM CURRENT USE OF INSULIN: ICD-10-CM

## 2023-04-06 DIAGNOSIS — E11.65 TYPE 2 DIABETES MELLITUS WITH HYPERGLYCEMIA, WITH LONG-TERM CURRENT USE OF INSULIN: ICD-10-CM

## 2023-04-06 DIAGNOSIS — E11.3299 BDR (BACKGROUND DIABETIC RETINOPATHY): ICD-10-CM

## 2023-04-06 DIAGNOSIS — I15.2 HYPERTENSION ASSOCIATED WITH DIABETES: Chronic | ICD-10-CM

## 2023-04-06 DIAGNOSIS — E11.621 DIABETIC ULCER OF HEEL ASSOCIATED WITH TYPE 2 DIABETES MELLITUS, WITH NECROSIS OF MUSCLE, UNSPECIFIED LATERALITY: ICD-10-CM

## 2023-04-06 DIAGNOSIS — E11.59 HYPERTENSION ASSOCIATED WITH DIABETES: Chronic | ICD-10-CM

## 2023-04-06 DIAGNOSIS — I25.10 CORONARY ARTERY DISEASE INVOLVING NATIVE CORONARY ARTERY OF NATIVE HEART WITHOUT ANGINA PECTORIS: ICD-10-CM

## 2023-04-06 DIAGNOSIS — E11.65 UNCONTROLLED TYPE 2 DIABETES MELLITUS WITH HYPERGLYCEMIA: ICD-10-CM

## 2023-04-06 DIAGNOSIS — E78.5 DYSLIPIDEMIA ASSOCIATED WITH TYPE 2 DIABETES MELLITUS: ICD-10-CM

## 2023-04-06 DIAGNOSIS — Z79.4 TYPE 2 DIABETES MELLITUS WITH HYPERGLYCEMIA, WITH LONG-TERM CURRENT USE OF INSULIN: ICD-10-CM

## 2023-04-06 DIAGNOSIS — S98.132A AMPUTATION OF TOE OF LEFT FOOT: ICD-10-CM

## 2023-04-06 DIAGNOSIS — E11.69 DYSLIPIDEMIA ASSOCIATED WITH TYPE 2 DIABETES MELLITUS: ICD-10-CM

## 2023-04-06 DIAGNOSIS — L97.403 DIABETIC ULCER OF HEEL ASSOCIATED WITH TYPE 2 DIABETES MELLITUS, WITH NECROSIS OF MUSCLE, UNSPECIFIED LATERALITY: ICD-10-CM

## 2023-04-06 LAB
ALBUMIN/CREAT UR: 283.7 UG/MG (ref 0–30)
CREAT UR-MCNC: 166 MG/DL (ref 23–375)
MICROALBUMIN UR DL<=1MG/L-MCNC: 471 UG/ML

## 2023-04-06 PROCEDURE — 3079F PR MOST RECENT DIASTOLIC BLOOD PRESSURE 80-89 MM HG: ICD-10-PCS | Mod: CPTII,S$GLB,, | Performed by: NURSE PRACTITIONER

## 2023-04-06 PROCEDURE — 99999 PR PBB SHADOW E&M-EST. PATIENT-LVL V: CPT | Mod: PBBFAC,,, | Performed by: NURSE PRACTITIONER

## 2023-04-06 PROCEDURE — 1159F MED LIST DOCD IN RCRD: CPT | Mod: CPTII,S$GLB,, | Performed by: NURSE PRACTITIONER

## 2023-04-06 PROCEDURE — 99215 OFFICE O/P EST HI 40 MIN: CPT | Mod: S$GLB,,, | Performed by: NURSE PRACTITIONER

## 2023-04-06 PROCEDURE — 3008F BODY MASS INDEX DOCD: CPT | Mod: CPTII,S$GLB,, | Performed by: NURSE PRACTITIONER

## 2023-04-06 PROCEDURE — 3074F PR MOST RECENT SYSTOLIC BLOOD PRESSURE < 130 MM HG: ICD-10-PCS | Mod: CPTII,S$GLB,, | Performed by: NURSE PRACTITIONER

## 2023-04-06 PROCEDURE — 1159F PR MEDICATION LIST DOCUMENTED IN MEDICAL RECORD: ICD-10-PCS | Mod: CPTII,S$GLB,, | Performed by: NURSE PRACTITIONER

## 2023-04-06 PROCEDURE — 3079F DIAST BP 80-89 MM HG: CPT | Mod: CPTII,S$GLB,, | Performed by: NURSE PRACTITIONER

## 2023-04-06 PROCEDURE — 99999 PR PBB SHADOW E&M-EST. PATIENT-LVL V: ICD-10-PCS | Mod: PBBFAC,,, | Performed by: NURSE PRACTITIONER

## 2023-04-06 PROCEDURE — 3074F SYST BP LT 130 MM HG: CPT | Mod: CPTII,S$GLB,, | Performed by: NURSE PRACTITIONER

## 2023-04-06 PROCEDURE — 1160F RVW MEDS BY RX/DR IN RCRD: CPT | Mod: CPTII,S$GLB,, | Performed by: NURSE PRACTITIONER

## 2023-04-06 PROCEDURE — 4010F PR ACE/ARB THEARPY RXD/TAKEN: ICD-10-PCS | Mod: CPTII,S$GLB,, | Performed by: NURSE PRACTITIONER

## 2023-04-06 PROCEDURE — 1160F PR REVIEW ALL MEDS BY PRESCRIBER/CLIN PHARMACIST DOCUMENTED: ICD-10-PCS | Mod: CPTII,S$GLB,, | Performed by: NURSE PRACTITIONER

## 2023-04-06 PROCEDURE — 3008F PR BODY MASS INDEX (BMI) DOCUMENTED: ICD-10-PCS | Mod: CPTII,S$GLB,, | Performed by: NURSE PRACTITIONER

## 2023-04-06 PROCEDURE — 99215 PR OFFICE/OUTPT VISIT, EST, LEVL V, 40-54 MIN: ICD-10-PCS | Mod: S$GLB,,, | Performed by: NURSE PRACTITIONER

## 2023-04-06 PROCEDURE — 4010F ACE/ARB THERAPY RXD/TAKEN: CPT | Mod: CPTII,S$GLB,, | Performed by: NURSE PRACTITIONER

## 2023-04-06 PROCEDURE — 82570 ASSAY OF URINE CREATININE: CPT | Performed by: NURSE PRACTITIONER

## 2023-04-06 RX ORDER — INSULIN ASPART 100 [IU]/ML
100 INJECTION, SOLUTION INTRAVENOUS; SUBCUTANEOUS CONTINUOUS
Qty: 50 ML | Refills: 3 | Status: SHIPPED | OUTPATIENT
Start: 2023-04-06 | End: 2023-05-23 | Stop reason: SDUPTHER

## 2023-04-06 RX ORDER — DAPAGLIFLOZIN 5 MG/1
5 TABLET, FILM COATED ORAL DAILY
Qty: 90 TABLET | Refills: 3 | Status: SHIPPED | OUTPATIENT
Start: 2023-04-06 | End: 2023-05-23 | Stop reason: SDUPTHER

## 2023-04-06 RX ORDER — LISINOPRIL 40 MG/1
40 TABLET ORAL DAILY
Qty: 30 TABLET | Refills: 11 | Status: SHIPPED | OUTPATIENT
Start: 2023-04-06 | End: 2023-05-23 | Stop reason: SDUPTHER

## 2023-04-06 RX ORDER — SEMAGLUTIDE 1.34 MG/ML
0.5 INJECTION, SOLUTION SUBCUTANEOUS
Qty: 4 EACH | Refills: 3 | Status: SHIPPED | OUTPATIENT
Start: 2023-04-06 | End: 2023-05-23 | Stop reason: SDUPTHER

## 2023-04-06 RX ORDER — METFORMIN HYDROCHLORIDE 1000 MG/1
1000 TABLET ORAL 2 TIMES DAILY WITH MEALS
Qty: 180 TABLET | Refills: 3 | Status: SHIPPED | OUTPATIENT
Start: 2023-04-06 | End: 2024-04-03 | Stop reason: SDUPTHER

## 2023-04-06 RX ORDER — CARVEDILOL 12.5 MG/1
6.25 TABLET ORAL 2 TIMES DAILY WITH MEALS
Qty: 90 TABLET | Refills: 3 | Status: SHIPPED | OUTPATIENT
Start: 2023-04-06 | End: 2024-04-03 | Stop reason: SDUPTHER

## 2023-04-06 RX ORDER — ATORVASTATIN CALCIUM 80 MG/1
80 TABLET, FILM COATED ORAL DAILY
Qty: 30 TABLET | Refills: 11 | Status: SHIPPED | OUTPATIENT
Start: 2023-04-06 | End: 2024-03-08

## 2023-04-06 RX ORDER — BLOOD-GLUCOSE SENSOR
1 EACH MISCELLANEOUS
Qty: 3 EACH | Refills: 11 | Status: SHIPPED | OUTPATIENT
Start: 2023-04-06 | End: 2023-05-23 | Stop reason: SDUPTHER

## 2023-04-06 RX ORDER — TIRZEPATIDE 2.5 MG/.5ML
2.5 INJECTION, SOLUTION SUBCUTANEOUS
Qty: 4 PEN | Refills: 1 | Status: SHIPPED | OUTPATIENT
Start: 2023-04-06 | End: 2023-04-06

## 2023-04-06 NOTE — PROGRESS NOTES
57 y.o. male here for routine follow up for T2dm -   Last seen in august 2022 - those notes are below.     His a1c came down finally from 10% to 7.1% - he did great.   However he is having issues with cost of his medications still - he is a  and his wife works at [employer].   Has insurance through his wife's work.     No new a1c for today, but in January was at 7.1% - so he was so so happy.   He continues on Metformin 1000mg bid   Trulicity 1.5mg sc weekly was working great - but he stopped it taking it January 2023 - b/c he can't afford.   B/c his deductible is $5000 so he can't pay for it.   Was on farxiga 5 mg tablet daily - worked great his a1c went down - but now he cannot afford it b/c it's $300 b/c of his deductible.  History of toe amputation --  Medtronic 770 insulin pump - manual mode  Basal rate at 1.6 units/hour - he is of note using Novolin R - gets vials from [email or web address] - in his pump - b/c he couldn't afford the other insulin.     Total daily dose 80.1 units/day -   48% coming from basal rate.   52% is coming from bolusing.   Vhanges every 3.7 days on average.   Active insulin time is 4 hours.   Bg target 110 - 120   ISF is at 25   Carb ratio is at 6   However he uses presets -   5 untis for snack.   15 units for meals  10 units for smaller meal.      He is checking his sugars with relion glucometer - [employer]- fingersticks -   His insurance would never cover a cgm -   He is checking daily - it's around 200 - 260's on average.   It gets higher in the evenings almost always -   He works as a  overnight so he's running high glucoses overnight essentially while eating.   He has no logs with him to show.   (Before when he was on trulicity and farxiga - it was 110 - 140s on average and he loved this).       Last visit notes from august 2022 as follows  57 y.o. male here for 5 month followup for management of t2dm -   Last seen march 2022 - those notes below.     a1c is decreased  "from 12.2% to 10% -   However he reports his glucoses are in the 90 - 150's.   He checks his sugars 1 - 2 times per day.   His blood sugar is level of 99 and this is fasting -   Admits he is not eating "as much" - smaller portions.   Is trying to limit his carbs, but admits eating what he can "get" -   We have tried to get him on cgm therapy for the past 1 - 2 years, but never can get approved/can't afford.   He prefers to fingerstick.   He denies any known hypoglycemic episodes.   States bg's vary over the past month from 90 - 211 at the highest.     Is on insulin continuously via pump   Continues on metformin 1000 mg twice per day -   Also on trulicity 1.5mg SC every week -    Denies any side effects from it - he is having normal bm's, denies nausea/vomiting or abdominal pain.   No constipation -   He was estimated to have a high cost, however so stopped taking the trulicity for a couple months   Now the cost has gone down/He is actually getting from the pharmacy - and gets it for free from [facility].     Continues on Mirego 770 pump - downloaded and reviewed today -   See scanned in .   Using humalog in insulin pump -     In manual mode -   Total daily dose - 70.8 units/day -   64% is coming from basal rate.   36% is coming from bolusing -   Active insulin time is 4 hours.   He is giving/use presets - 10 or 15 units. Has a snack dose set for 5 units.    Basal rate is 1.9 units/hour -   Carb ratio of 1: 6 - however he is not carb counting   isf set at 25 -   bg target 110 - 120 -       Last OV from march 2022 as follows:   56 y.o. male here for 1 month follow up for management of T2dm -   Last seen 2/23/2022 - those notes are below.     a1c last @ 12.2%, however his bg's since last visit are coming down tremendously.   I began him on trulicity 0.75mg SC weekly at his last visit - he gives every Thursday.   States his glucoses have come down into the 150's on average.   I submitted for " the dexcom G6 - but it was going to be $900+ dollars through [employer] - so he didn't get/use.   He did try a sample florentino 2 - he liked using it - however didn't bring the reader with him today -   He would like a cgm going forward if possible.   For now, continues to fingerstick.     He continues on metformin 1000 bid.   trulicity 0.75 weekly.   Also on insulin pump and I adjusted settings on his pump at last visit.     medtronic 770 -   He remains in manual mode -   Total daily dose of insulin is 77.3 units/day.   59% is coming from the basal rate.   41% is coming from the bolus.   chaning his set every 3 days.   Active insulin time is at 4 hours.   Giving preset boluses -  5 units - snack.   10 units - regular meal.   15 units - medium meal.   He eats about 2 meals per day on average and snacks.   Sometimes bolusing just 2 times per day.     Settings:   Basal rate is at 1.9 units/hour.   Carb ratio is at 1: 56 -   ISF is at 1: 25   bg target is at 110 - 120.       He also has back up insulin - lantus for once per day.   Asking for refills for his bp and cholesterol meds.   Right foot ulcer - continues to heal.   Follows with podiatry - just started on cipro twice per day.       Last visit notes from 2/23/2022  56 y.o. gentleman, here for 2 week follow up for management of T2dm -   Last seen 2/9/2022 - those notes are below.     Historically uncontrolled - last a1c is @ 12.2%.   Had ulcers on foot, followed by staph infection, and then sustained a toe amputation.   On metformin 1000 bid.   I prescribed him farxiga - 5mg tablet daily - but he didn't start as the cost was too high.     Continues on medtronic 770 insulin pump - I had increased his rates at last visit -   He had been on novolin R in his pump as this was cheaper he found at [facility]. I switched him to humalog - gave voucher for $35 through [employer] - however stated it was still too expensive.   As his sugars running continuously high - we made adjustments to his  "pump. He feels this has helped somewhat.     Pump downloaded today -   Reviewed - see scanned in  -   100% in manual mode   TDD is 59.4 units/day   68% coming from basal rate.   32% coming from bolusing.   He does not carb count.   Uses preset boluses 5 units for a snack, 10 units for meal time boluses.   He boluses anywhere from 1 - 2 times per day on average in looking at his report.     Settings:   Active insulin time is 4 hours.   Basal rate is at 1.8 units/hour.   Carb ratio is 1: 6   ISF is at 1: 25   bg target is 110 - 125     He does not use cgm - states has high deductible.   Had tried to order him a guardian cgm in the past - to be closed loop with his pump, but he never did purchase.   He had a florentino Pro placed in office in December 2020 but did not continue on or use a personal.   He checks his glucose by fingerstick.   No logs with him today.   His sugar today in clinic is at 355. I showed him how to give a correction bolus on his pump and we walked through to use the bolus wizard together.   I had ordered him a dexcom at last visit however he never got b/c the cost was too high.   (it was $700 so he didn't  ).         Last visit notes from 2/9/2022 as follows:   56 y.o. male here for 1.5 year follow up -   Last seen 12/2020 - those notes are below.   That was our initial consult, and then he followed with diabetes education - has been lost to follow since.     a1c now up from 10.5% ---> 12.2%.   Historically uncontrolled glucoses and diabetes -   States "I feel great!"   However admits "feels tired just when he works long hours/shifts" - states he is working almost 7 days per week.   Lives at home with his wife.   Reports staph infection in his left foot worsened, - had to amputate his large toe on his left toe and 2nd bone.   States this occurred in July 2021 -     Current meds:   Metformin 100 bid.   Had prescribed him jardiance - but he never took  - states the cost had been too " "much.   He was on MDI - and switched him to pump therapy -   (formerly on lantus 60 every HS and novolin R 25 units tid ac meals).     He began on medtronic pump shortly after I saw him - but then never followed up.   He is on medtronic 770 pump - his insurance has high cost for humalog or novolog.   He is on novolin R in his insulin pump. Found cheaper at [facility] by the vial.   He often does not bolus with meals - only 1 - 2 times per week -   He eats however 5 times per day - 3 meals and 2 snacks -   Has never carb counted before - but admits he usually just "estimates" how much insulin he needs.   (usually just 6 - 7 units with meals).     medtronic pump downloaded and reviewed today -   Insulin rate is at 1.5 units/hour -   Carb ratio is at 1: 6 - but he does not input carbs.   ISF 25 -   bg target 110 - 120   Manual mode is 100%   Average bg is 308 -   Total daily dose is 40 units   85% is coming from basal rate.   15% is coming from bolusing.   Active insulin time is set for 4 hours.     Checking sugars with a glucometer - Relion meter - doesn't have his meter with him.   Has never used cgm  Therapy.   I had ordered guardian for him to go with his pump - but never was covered or cost was too much for him.   Last checked glucose yesterday.   Had small piece of lasagna for lunch - states gave 6 units of insulin   No acute changes in physical complaints.   Has seen eye doctor - states no acute changes in vision - still wears glasses -       Last visit notes from 12/2020:   HPI: Billy Yohannes Rivera is a 57 y.o.  male c/I for visit to address Diabetes Type 2  This is the first time I am seeing him for care, follows with Dr. BRAD Baker MD for primary care needs.   Recently has switched to seeing dr. Foreman for primary care needs.   Has not seen endocrinology or diabetes specialist in the past.     He had recent covid 19 infection, but this has since resolved.   He was diagnosed with T2DM about 20 years " ago  Family history - both sides strong history.   Has never been hospitalized r/t DM.  Denies missing doses of DM medication.     Current a1c @ 10.5%. Historically uncontrolled in past.   Current meds -   Metformin 1000 bid.   Began insulin shortly after his diagnosis.   lantus 55 units every night.   novolin R ac meals - 25 units tid.  (previously on novolog but insurance stopped covering).   Tried trulicity in his past, but caused severe constipation. Does not really want to try drug class again for that reason.   Has never tried SGLT2i's.     He is feeling well, does not feel when his sugars are high or low.  Does not titrate his insulin based on glucose readings. Generally always gives the full 25 units as his sugars just remain high regardless  Of what he eats or what he does.   He admits not always following a diabetic diet, finds it hard to eliminate carbs or limit them.     Complications from diabetes -   +Retinopathy.   + CV disease   + history of MI, Nstemi in May 2013. Got stent placed.   -nephropathy.   Large foot ulcer on left foot - receiving IV antibiotics via right upper arm picc line.   In boot with wound vac. Following with podiatry.        Past medical History:   Past Medical History:   Diagnosis Date    Allergy     CAD (coronary artery disease), native coronary artery 6/25/2013    Cancer     COVID-19 virus detected 9/1/2020    Diabetes mellitus     Diabetes mellitus, type 2     Disorder of kidney and ureter     Heart attack 04/2012    Hx of colon cancer, stage I     Hyperlipidemia     Hypertension     Muscular pain     post-op after colonoscopy    NSTEMI May 2013 - peak troponin 0.22 5/22/2013    MICHAELA (obstructive sleep apnea)     Retinopathy due to secondary diabetes       Family hx:   Family History   Problem Relation Age of Onset    Heart disease Mother     Diabetes Mother     Diabetes Father     Heart disease Brother     Diabetes Maternal Grandmother     Diabetes Maternal Grandfather      "Diabetes Paternal Grandmother     Diabetes Paternal Grandfather     Anesthesia problems Neg Hx       Current meds:   Current Outpatient Medications:     acetaminophen (TYLENOL) 500 MG tablet, Take 1,000 mg by mouth daily as needed for Pain., Disp: , Rfl:     ascorbic acid, vitamin C, (VITAMIN C) 250 MG tablet, Take 500 mg by mouth once daily., Disp: , Rfl:     aspirin (ECOTRIN) 81 MG EC tablet, Take 1 tablet (81 mg total) by mouth once daily., Disp: , Rfl: 0    bismuth subsalicylate (PEPTO BISMOL) 262 mg/15 mL suspension, Take 15 mLs by mouth daily as needed (diarrhea)., Disp: , Rfl:     blood sugar diagnostic (ACCU-CHEK GUIDE TEST STRIPS) Strp, 1 each by Misc.(Non-Drug; Combo Route) route 5 (five) times daily., Disp: 150 each, Rfl: 11    carvediloL (COREG) 12.5 MG tablet, Take 0.5 tablets (6.25 mg total) by mouth 2 (two) times daily with meals., Disp: 180 tablet, Rfl: 3    insulin (LANTUS SOLOSTAR U-100 INSULIN) glargine 100 units/mL SubQ pen, Inject 50 Units into the skin once daily. USE AS BACK UP INSULIN ONLY - EMERGENCY USE IF YOUR ARE OFF OF YOUR INSULIN PUMP, Disp: 15 mL, Rfl: 1    insulin lispro (HUMALOG U-100 INSULIN) 100 unit/mL injection, Inject 15 Units into the skin 3 (three) times daily before meals. Gives via insulin pump only. Do not Directly inject., Disp: , Rfl:     lancets (ACCU-CHEK FASTCLIX LANCET DRUM) Misc, 1 each by Misc.(Non-Drug; Combo Route) route Daily., Disp: 30 each, Rfl: 11    multivit-min/folic/vit K/lycop (MEN'S MULTIVITAMIN ORAL), Take 1 tablet by mouth once daily., Disp: , Rfl:     pen needle, diabetic 31 gauge x 3/16" Ndle, Inject 1 each into the skin 4 (four) times daily., Disp: , Rfl:     atorvastatin (LIPITOR) 80 MG tablet, Take 1 tablet (80 mg total) by mouth once daily. Take every night., Disp: 30 tablet, Rfl: 11    blood-glucose meter,continuous (DEXCOM ) Misc, 1 Device by Misc.(Non-Drug; Combo Route) route continuous., Disp: 1 each, Rfl: 0    blood-glucose sensor " (DEXCOM G7 SENSOR) Haley, 1 each by Misc.(Non-Drug; Combo Route) route every 10 days., Disp: 3 each, Rfl: 11    dapagliflozin (FARXIGA) 5 mg Tab tablet, Take 1 tablet (5 mg total) by mouth once daily., Disp: 90 tablet, Rfl: 3    insulin aspart U-100 (NOVOLOG U-100 INSULIN ASPART) 100 unit/mL injection, Inject 100 Units into the skin continuous. Use as directed - current rate is 1.6 units/hour but using bolusing presets - To use continuously with insulin pump. Max TDD of 100 units, Disp: 50 mL, Rfl: 3    lisinopriL (PRINIVIL,ZESTRIL) 40 MG tablet, Take 1 tablet (40 mg total) by mouth once daily., Disp: 30 tablet, Rfl: 11    metFORMIN (GLUCOPHAGE) 1000 MG tablet, Take 1 tablet (1,000 mg total) by mouth 2 (two) times daily with meals., Disp: 180 tablet, Rfl: 3    MINIMED 770G INSULIN PUMP Misc, 1 each by Misc.(Non-Drug; Combo Route) route continuous. Medically necessary for management of Type 2 diabetes, E11.65, Disp: 1 each, Rfl: 0    semaglutide (OZEMPIC) 0.25 mg or 0.5 mg(2 mg/1.5 mL) pen injector, Inject 0.5 mg into the skin every 7 days. Take 0.25mg SC every week x 2 weeks. Then increase to 0.5mg SC every week thereafter., Disp: 4 each, Rfl: 3     Current Diabetes medications:   novolin r via insulin pump (purchases from [employer]) --  Now has changed to humalog and got $35/month with voucher.   Uses in medtronic 770 pump   Metformin 1000 mg po bid with food  Trulicity 1.5mg sc every week - if off right now - b/c he can't afford  Farxiga 5 - is off - b/c he can't afford.   Of note - he has lantus pen - 50 untis once per day for back up insulin     Medications Tried and Failed:   trulicity - severe constipation   novolog - insurance wouldn't cover so switched to novolin r.  Long acting insulin: lantus 55 units every night.   Short acting insulin:  novolin r 25 units tid ac meals - takes 5 - 15 minutes prior to meals.   jardiance - was prescribed, but he never took as the cost was too much.   farxiga - was prescribed  "    Review of Pertinent co-morbidities/risk factors:   CV: + history of MI   CAD: yes nstemi with stent.   Now history of left foot toe amputation.   Takes aspirin 81mg tablet daily  BP: has history of HTN  Statin: Taking  ACE/ARB: Taking    Social History     Tobacco Use   Smoking Status Never    Passive exposure: Never   Smokeless Tobacco Never      Social:   Lives at home with: wife.   Life changes/stressors currently: has ulcer to left foot - so off work for now while this heals.   Diet:  Not always following ADA diet   Meals: 3 per day and snacks.        Breakfast - cornflakes with milk, eggs, oatmeal       Lunch - precooked meals from [employer]. (meat/veggie/small carb).        Dinner - hot plate meals from restaurants - meat/veggie/starch. Chicken and vegetables, beef, occasionally rice.   Spaghetti and meatballs.        Snacks - doritos bags, cheetos, kind bar reduced sugar.        Drinks - diet tea, milk, diet lemonade, diet coke, water.   Exercise: nothing formal, but normally walking a lot in his work.   Activities: was working full time as  at [employer]. No longer working.     Glucose Monitoring:   Checking sugars 4x/day by fingerstick.  Has relion meter.    CGM - has never used, Free style florentino and dexcom are both excluded from his insurance plan in the past -   Had prescribed guardian cgm - but never was covered.     Standards of care:   Eyes: .: 08/15/2019  Foot exam: : 03/15/2023   Diabetes education: yes - on start of insulin pump therapy.     Vital Signs  /80 (BP Location: Right arm, Patient Position: Sitting, BP Method: Large (Manual))   Pulse 77   Temp 97.3 °F (36.3 °C) (Temporal)   Resp 15   Ht 6' 2" (1.88 m)   Wt 129.6 kg (285 lb 11.5 oz)   SpO2 97%   BMI 36.68 kg/m²     Pertinent Labs:   Hgba1c   Lab Results   Component Value Date    HGBA1C 7.1 (H) 11/10/2022    HGBA1C 8.0 (H) 08/09/2022    HGBA1C 10.0 (H) 06/08/2022     Lipid panel   Lab Results   Component Value Date "    CHOL 121 11/10/2022    CHOL 175 01/26/2022    CHOL 144 08/12/2020     Lab Results   Component Value Date    HDL 34 (L) 11/10/2022    HDL 43 01/26/2022    HDL 39 (L) 08/12/2020     Lab Results   Component Value Date    LDLCALC 57.4 (L) 11/10/2022    LDLCALC 93.2 01/26/2022    LDLCALC 66.6 08/12/2020     Lab Results   Component Value Date    TRIG 148 11/10/2022    TRIG 194 (H) 01/26/2022    TRIG 192 (H) 08/12/2020     Lab Results   Component Value Date    CHOLHDL 28.1 11/10/2022    CHOLHDL 24.6 01/26/2022    CHOLHDL 27.1 08/12/2020      CMP  Glucose   Date Value Ref Range Status   01/03/2023 151 (H) 70 - 110 mg/dL Final     BUN   Date Value Ref Range Status   01/03/2023 23 (H) 6 - 20 mg/dL Final     Creatinine   Date Value Ref Range Status   01/03/2023 0.9 0.5 - 1.4 mg/dL Final     eGFR if    Date Value Ref Range Status   06/10/2022 >60.0 >60 mL/min/1.73 m^2 Final     eGFR if non    Date Value Ref Range Status   06/10/2022 >60.0 >60 mL/min/1.73 m^2 Final     Comment:     Calculation used to obtain the estimated glomerular filtration  rate (eGFR) is the CKD-EPI equation.        AST   Date Value Ref Range Status   01/03/2023 22 10 - 40 U/L Final     ALT   Date Value Ref Range Status   01/03/2023 30 10 - 44 U/L Final     Microalbumin creatinine ratio:   Lab Results   Component Value Date    MICALBCREAT 47.2 (H) 11/10/2022       Review Of Systems:   Gen: Appetite good, no weight loss or gain,  No fatigue. Denies polydipsia.  Skin: Skin is intact and heals well, denies any rashes or hair changes.   EENT: Denies any acute visual disturbances, nor blurred vision.   Resp: Denies SOB or Dyspnea on exertion, denies cough.   Cardiac: Denies chest pain, palpitations, or swelling.   GI: Denies abdominal pain, nausea or vomiting, diarrhea, or constipation.   /GYN: + nocturia several times per night, however denies burning, frequency or pain on urination.  MS/Neuro: Denies numbness/ tingling in  BLE; unsteady Gait, uses boot.   speech clear  Psych: Denies drug/ETOH abuse, no hx of depression.  Other systems: negative.    Physical Exam:   GENERAL: Well developed, well nourished in appearance.   PSYCH: AAOx3, appropriate mood and affect, pleasant expression, conversant, appears relaxed, well groomed.   EYES: PERRL, Conjunctiva and corneas clear  NECK: Soft and Supple, trachea midline  CHEST: Even, regular, and unlabored respirations  ABDOMEN: Soft, non-tender, non-distended. Normal bowel sounds.   VASCULAR: pedal pulses palpable bilaterally, no edema.  NEURO:  cranial nerves II - XII intact   MUSCULOSKELETAL: Good ROM,  unsteady gait. Due to boot on right foot.   SKIN: Skin warm, dry, and intact   FEET: right foot in boot - amputation on left foot/large toe.     Assessment and Plan of Care:     Billy was seen today for follow-up.    Diagnoses and all orders for this visit:    Type 2 diabetes mellitus with diabetic peripheral angiopathy without gangrene, with long-term current use of insulin  -     insulin aspart U-100 (NOVOLOG U-100 INSULIN ASPART) 100 unit/mL injection; Inject 100 Units into the skin continuous. Use as directed - current rate is 1.6 units/hour but using bolusing presets - To use continuously with insulin pump. Max TDD of 100 units  -     atorvastatin (LIPITOR) 80 MG tablet; Take 1 tablet (80 mg total) by mouth once daily. Take every night.  -     blood-glucose sensor (DEXCOM G7 SENSOR) Haley; 1 each by Misc.(Non-Drug; Combo Route) route every 10 days.  -     blood-glucose meter,continuous (DEXCOM ) Misc; 1 Device by Misc.(Non-Drug; Combo Route) route continuous.    Hypertension associated with diabetes    BDR (background diabetic retinopathy) - Both Eyes    Dyslipidemia associated with type 2 diabetes mellitus    Coronary artery disease involving native coronary artery of native heart without angina pectoris    Diabetic ulcer of heel associated with type 2 diabetes mellitus, with  necrosis of muscle, unspecified laterality    Class 2 severe obesity with body mass index (BMI) of 35 to 39.9 with serious comorbidity    Type 2 diabetes mellitus 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, with long-term current use of insulin  -     Hemoglobin A1C; Future  -     Lipid Panel; Future  -     Comprehensive Metabolic Panel; Future  -     Microalbumin/Creatinine Ratio, Urine; Future  -     POCT Glucose, Hand-Held Device  -     dapagliflozin (FARXIGA) 5 mg Tab tablet; Take 1 tablet (5 mg total) by mouth once daily.    Amputation of toe of left foot    Uncontrolled type 2 diabetes mellitus with hyperglycemia    Type 2 diabetes mellitus with hyperglycemia, with long-term current use of insulin  -     metFORMIN (GLUCOPHAGE) 1000 MG tablet; Take 1 tablet (1,000 mg total) by mouth 2 (two) times daily with meals.    Other orders  -     Discontinue: tirzepatide (MOUNJARO) 2.5 mg/0.5 mL PnIj; Inject 2.5 mg into the skin every 7 days.  -     semaglutide (OZEMPIC) 0.25 mg or 0.5 mg(2 mg/1.5 mL) pen injector; Inject 0.5 mg into the skin every 7 days. Take 0.25mg SC every week x 2 weeks. Then increase to 0.5mg SC every week thereafter.  -     lisinopriL (PRINIVIL,ZESTRIL) 40 MG tablet; Take 1 tablet (40 mg total) by mouth once daily.         1. T2DM with hyperglycemia- Hgba1c goal is 7.5% or less without hypoglycemia - 11.5%--> 10.8%--> 10.5%--> 12.2% -->10%  -->8%--> 7.1% currently.   discussed DM, progression of disease, long term complications, CV risk factors and tx options.   Has already had MI in past.   Continue/restart glp1 - used to do trulicity - 1.5mg every week. Sent in for ozempc 0.5mg every week - and asking for patient assistance.   Continue metformin 1000 bid.   reStart farxiga 5mg tablet daily - he has right toe pinky amputation, and left large toe amputation - will use caution -   Will decrease insulin dose preventatively as we are starting sglt2i.   Sent  in for patient assitance.   He is going to notify me if he has trouble getting his meds.   Continue on medtronic 770 insulin pump -presets from 10 units with meals to 15 units with meals. No changes.   We have tried for cgm but cost has been high.   Encouraged to bolus for meals -   Entered in presets for him at last visit -   5 units for a snack.   10 or 15 units for medium or large meal.   Advised on over bolusing/correcting with just 5 units.   Advised to please avoid fast food, chips, processed foods.   Increased basal rate from 1.5 to 1.8 u/hour --->1.9 units/hour at last visit.   Decreased rate to 1.6 units/hour at last visit as he is having sugars in the 90's and higher on basal rate, and I am starting him on farxiga 5mg tablet and I anticipate.   Today - I did split basal rates - 1.6 untis hour/during day time.   Icnreased night time to 1.75 units/hour   (12 am until 7am)   Reviewed back up plan - lantus 50 units daily- he has the pen  Patient likes to talk/text/use his smart phone so looking forward to insulin pump and getting sugars under control.   No history of pancreatitis or medullary thyroid cancer.   May plan to increase dose of trulicity if tolerated well.   Notify me if side effects such as severe abdominal pain, nausea, diarrhea.   Advise compliance with ADA diet and encourage exercise  Reviewed  hypoglycemia, s/s and appropriate tx. Have/get quick acting glucose tablets at hand.   Instructed to monitor Blood glucose 2 - 4x/day and bring meter/ log to every clinic visit.       2. HTN - controlled, continue meds as previously prescribed and monitor.   Coreg 12.5 po bid.   Lisinopril 40.   Urine mac + 202 ---> 415 - start sglt2i.     3. HLD - LDL goal < 100. He is at goal.   Currently on statin therapy- lipitor 80 mg every HS.   History of MI/nstemi.   On aspirin daily.     4. Weight - BMI Body mass index is 36.68 kg/m².   Encourage Ada diet and exercise.   Restart glp1 - sending in for ozempic as  they offer patient assistance.   He would be an excellent Mounjaro candidate.     5. Renal Function - stable. No history of nephropathy.   Has clinical urine mac +   Starting low dose farxiga.     6. Infections/s/p amputationss to toes ---> - now resulted in amputation - two toes  advised to gain compliance and not miss meal time boluses.   The high sugars are responsible culprit for the infection.   Following with podiatry.   I am starting farxiga but using with caution b/c of the history of amputations.       Get new labs today.   Restart glp1 - will try for ozempic.   Restart farxiga - apply for patient assistance for both.   Send in rx for dexcom g7 and  if he can afford, if insurance will approve.   F/u in 3 months

## 2023-04-06 NOTE — PATIENT INSTRUCTIONS
Start ozempic injction one time per week.   Restart farxiga 5mg tablet daily.     Have lantus insulin pen on hand to use - 50 units daily as back up insulin.     For low blood sugar - remember to keep glucose tablets on hand. You can purchase these at the pharmacy check out desk/over the counter.   If blood sugar is less than 70, take/eat 2 - 3 tablets quickly to bring your sugar up.   Always keep 15 grams of Quick acting carbohydrates on hand to eat/drink if your sugar is low - examples are 1/2 cup of juice, coke, or crackers, granola bars.   Remember to eat meals frequently to prevent low blood blood sugars.

## 2023-04-07 ENCOUNTER — TELEPHONE (OUTPATIENT)
Dept: PHARMACY | Facility: CLINIC | Age: 58
End: 2023-04-07
Payer: COMMERCIAL

## 2023-04-10 ENCOUNTER — TELEPHONE (OUTPATIENT)
Dept: PODIATRY | Facility: CLINIC | Age: 58
End: 2023-04-10
Payer: COMMERCIAL

## 2023-04-10 ENCOUNTER — TELEPHONE (OUTPATIENT)
Dept: PHARMACY | Facility: CLINIC | Age: 58
End: 2023-04-10
Payer: COMMERCIAL

## 2023-04-10 NOTE — TELEPHONE ENCOUNTER
----- Message from Nilton Randolph MA sent at 4/10/2023  8:38 AM CDT -----  Shama from [facility] faxed over detailed written order on March 29, and have not received anything. She provided fax number [phone].  Shama can be reached at [phone].

## 2023-04-10 NOTE — TELEPHONE ENCOUNTER
Spoke with Shama and informed her that I received today's fax and will send as soon as it is signed

## 2023-04-10 NOTE — TELEPHONE ENCOUNTER
Patient has been issued Farxiga and Ozempic co-pay cards for financial assistance. Due to the patient having private insurance, he only qualifies for co-pay assistance through  co-pay cards. Patient has been notified.

## 2023-04-11 ENCOUNTER — TELEPHONE (OUTPATIENT)
Dept: INTERNAL MEDICINE | Facility: CLINIC | Age: 58
End: 2023-04-11
Payer: COMMERCIAL

## 2023-04-11 NOTE — TELEPHONE ENCOUNTER
----- Message from Candie Reed sent at 4/11/2023  3:04 PM CDT -----  Contact: Self/[phone]  Patient is returning a phone call.  Who left a message for the patient: ?  Does patient know what this is regarding:  ?  Would you like a call back, or a response through your [facility] portal?:   call back   Comments:

## 2023-04-11 NOTE — TELEPHONE ENCOUNTER
Let pt know another appointment has been made sooner to adjust insulin since his glucose is higher. Pt verbalized understanding.

## 2023-04-11 NOTE — TELEPHONE ENCOUNTER
Tried calling patient, no answer - left vm.   Want him to come in sooner than later for follow up as his insulin dosing should get adjusted - up - as his glucose is running higher now as he cant afford his medications due to deductible.     Can you make him a sooner appt. With me - maybe in 6 weeks? Then keep the visit in July as scheduled that he has already.   Thanks,  Akua

## 2023-04-12 ENCOUNTER — OFFICE VISIT (OUTPATIENT)
Dept: PODIATRY | Facility: CLINIC | Age: 58
End: 2023-04-12
Payer: COMMERCIAL

## 2023-04-12 ENCOUNTER — TELEPHONE (OUTPATIENT)
Dept: PODIATRY | Facility: CLINIC | Age: 58
End: 2023-04-12

## 2023-04-12 VITALS
SYSTOLIC BLOOD PRESSURE: 130 MMHG | DIASTOLIC BLOOD PRESSURE: 70 MMHG | BODY MASS INDEX: 37.75 KG/M2 | HEART RATE: 58 BPM | HEIGHT: 74 IN | WEIGHT: 294.13 LBS

## 2023-04-12 DIAGNOSIS — L97.512 FOOT ULCER, RIGHT, WITH FAT LAYER EXPOSED: ICD-10-CM

## 2023-04-12 DIAGNOSIS — E11.49 TYPE II DIABETES MELLITUS WITH NEUROLOGICAL MANIFESTATIONS: Primary | ICD-10-CM

## 2023-04-12 PROCEDURE — 3008F PR BODY MASS INDEX (BMI) DOCUMENTED: ICD-10-PCS | Mod: CPTII,S$GLB,, | Performed by: PODIATRIST

## 2023-04-12 PROCEDURE — 99999 PR PBB SHADOW E&M-EST. PATIENT-LVL IV: ICD-10-PCS | Mod: PBBFAC,,, | Performed by: PODIATRIST

## 2023-04-12 PROCEDURE — 3066F PR DOCUMENTATION OF TREATMENT FOR NEPHROPATHY: ICD-10-PCS | Mod: CPTII,S$GLB,, | Performed by: PODIATRIST

## 2023-04-12 PROCEDURE — 99213 PR OFFICE/OUTPT VISIT, EST, LEVL III, 20-29 MIN: ICD-10-PCS | Mod: 25,S$GLB,, | Performed by: PODIATRIST

## 2023-04-12 PROCEDURE — 11043 PR DEBRIDEMENT, SKIN, SUB-Q TISSUE,MUSCLE,=<20 SQ CM: ICD-10-PCS | Mod: RT,S$GLB,, | Performed by: PODIATRIST

## 2023-04-12 PROCEDURE — 3078F PR MOST RECENT DIASTOLIC BLOOD PRESSURE < 80 MM HG: ICD-10-PCS | Mod: CPTII,S$GLB,, | Performed by: PODIATRIST

## 2023-04-12 PROCEDURE — 4010F PR ACE/ARB THEARPY RXD/TAKEN: ICD-10-PCS | Mod: CPTII,S$GLB,, | Performed by: PODIATRIST

## 2023-04-12 PROCEDURE — 3060F POS MICROALBUMINURIA REV: CPT | Mod: CPTII,S$GLB,, | Performed by: PODIATRIST

## 2023-04-12 PROCEDURE — 3066F NEPHROPATHY DOC TX: CPT | Mod: CPTII,S$GLB,, | Performed by: PODIATRIST

## 2023-04-12 PROCEDURE — 11043 DBRDMT MUSC&/FSCA 1ST 20/<: CPT | Mod: RT,S$GLB,, | Performed by: PODIATRIST

## 2023-04-12 PROCEDURE — 3075F PR MOST RECENT SYSTOLIC BLOOD PRESS GE 130-139MM HG: ICD-10-PCS | Mod: CPTII,S$GLB,, | Performed by: PODIATRIST

## 2023-04-12 PROCEDURE — 3075F SYST BP GE 130 - 139MM HG: CPT | Mod: CPTII,S$GLB,, | Performed by: PODIATRIST

## 2023-04-12 PROCEDURE — 1159F PR MEDICATION LIST DOCUMENTED IN MEDICAL RECORD: ICD-10-PCS | Mod: CPTII,S$GLB,, | Performed by: PODIATRIST

## 2023-04-12 PROCEDURE — 99999 PR PBB SHADOW E&M-EST. PATIENT-LVL IV: CPT | Mod: PBBFAC,,, | Performed by: PODIATRIST

## 2023-04-12 PROCEDURE — 3008F BODY MASS INDEX DOCD: CPT | Mod: CPTII,S$GLB,, | Performed by: PODIATRIST

## 2023-04-12 PROCEDURE — 3046F PR MOST RECENT HEMOGLOBIN A1C LEVEL > 9.0%: ICD-10-PCS | Mod: CPTII,S$GLB,, | Performed by: PODIATRIST

## 2023-04-12 PROCEDURE — 1159F MED LIST DOCD IN RCRD: CPT | Mod: CPTII,S$GLB,, | Performed by: PODIATRIST

## 2023-04-12 PROCEDURE — 3078F DIAST BP <80 MM HG: CPT | Mod: CPTII,S$GLB,, | Performed by: PODIATRIST

## 2023-04-12 PROCEDURE — 3046F HEMOGLOBIN A1C LEVEL >9.0%: CPT | Mod: CPTII,S$GLB,, | Performed by: PODIATRIST

## 2023-04-12 PROCEDURE — 4010F ACE/ARB THERAPY RXD/TAKEN: CPT | Mod: CPTII,S$GLB,, | Performed by: PODIATRIST

## 2023-04-12 PROCEDURE — 99213 OFFICE O/P EST LOW 20 MIN: CPT | Mod: 25,S$GLB,, | Performed by: PODIATRIST

## 2023-04-12 PROCEDURE — 3060F PR POS MICROALBUMINURIA RESULT DOCUMENTED/REVIEW: ICD-10-PCS | Mod: CPTII,S$GLB,, | Performed by: PODIATRIST

## 2023-04-12 NOTE — TELEPHONE ENCOUNTER
Spoke to Shama and informed her that Mr. Rivera's paperwork is on Dr. Melara's desk and he will sign them and we will send them over to her today. She verbalized understanding and call ended.

## 2023-04-12 NOTE — PROGRESS NOTES
Subjective:      Patient ID: Billy Rivera is a 57 y.o. male.    Chief Complaint: Wound Care      Billy is a 57 y.o. male who presents to the clinic for evaluation and treatment of high risk feet. Billy has a past medical history of Allergy, CAD (coronary artery disease), native coronary artery (6/25/2013), Cancer, COVID-19 virus detected (9/1/2020), Diabetes mellitus, Diabetes mellitus, type 2, Disorder of kidney and ureter, Heart attack (04/2012), colon cancer, stage I, Hyperlipidemia, Hypertension, Muscular pain, NSTEMI May 2013 - peak troponin 0.22 (5/22/2013), MICHAELA (obstructive sleep apnea), and Retinopathy due to secondary diabetes.  He has been regular shoe gear with a new issue of breakdown of previous ulceration right foot below the 2nd toe joint as well as been recently measured for diabetic shoe gear.  PCP: BRAD Baker MD    Date Last Seen by PCP: 3/23/2022    Current shoe gear:  Affected Foot: DARCO Shoe right foot     Affected Foot: Tennis shoe left foot    History of Trauma: negative  Sign of Infection: none    Hemoglobin A1C   Date Value Ref Range Status   04/06/2023 9.4 (H) 4.0 - 5.6 % Final     Comment:     ADA Screening Guidelines:  5.7-6.4%  Consistent with prediabetes  >or=6.5%  Consistent with diabetes    High levels of fetal hemoglobin interfere with the HbA1C  assay. Heterozygous hemoglobin variants (HbS, HgC, etc)do  not significantly interfere with this assay.   However, presence of multiple variants may affect accuracy.     11/10/2022 7.1 (H) 4.0 - 5.6 % Final     Comment:     ADA Screening Guidelines:  5.7-6.4%  Consistent with prediabetes  >or=6.5%  Consistent with diabetes    High levels of fetal hemoglobin interfere with the HbA1C  assay. Heterozygous hemoglobin variants (HbS, HgC, etc)do  not significantly interfere with this assay.   However, presence of multiple variants may affect accuracy.     08/09/2022 8.0 (H) 4.0 - 5.6 % Final     Comment:     ADA Screening  Guidelines:  5.7-6.4%  Consistent with prediabetes  >or=6.5%  Consistent with diabetes    High levels of fetal hemoglobin interfere with the HbA1C  assay. Heterozygous hemoglobin variants (HbS, HgC, etc)do  not significantly interfere with this assay.   However, presence of multiple variants may affect accuracy.         Review of Systems   Constitutional: Negative for chills, decreased appetite, fever, malaise/fatigue and night sweats.   HENT:  Negative for congestion, hearing loss, nosebleeds and tinnitus.    Eyes:  Negative for double vision, pain, photophobia and visual disturbance.   Cardiovascular:  Negative for chest pain, claudication, cyanosis and leg swelling.   Respiratory:  Negative for cough, hemoptysis, shortness of breath and wheezing.    Endocrine: Negative for cold intolerance and heat intolerance.   Hematologic/Lymphatic: Negative for adenopathy and bleeding problem.   Skin:  Positive for color change, dry skin, nail changes, poor wound healing and suspicious lesions. Negative for itching and rash.   Musculoskeletal:  Negative for arthritis, falls, gout, joint pain, myalgias and stiffness.   Gastrointestinal:  Negative for abdominal pain, jaundice, nausea and vomiting.   Genitourinary:  Negative for dysuria, frequency and hematuria.   Neurological:  Positive for numbness and sensory change. Negative for difficulty with concentration, loss of balance and paresthesias.   Psychiatric/Behavioral:  Negative for altered mental status, hallucinations and suicidal ideas. The patient is not nervous/anxious.    Allergic/Immunologic: Negative for environmental allergies and persistent infections.         Objective:        Physical Exam  Constitutional:       General: He is not in acute distress.     Appearance: Normal appearance. He is well-developed.   HENT:      Head: Normocephalic and atraumatic.   Cardiovascular:      Pulses:           Dorsalis pedis pulses are 2+ on the right side and 2+ on the left side.         Posterior tibial pulses are 2+ on the right side and 2+ on the left side.      Comments: CFT< 3 secs all toes bilateral foot, skin temp warm to cool bilateral foot, no hair growth bilateral lower extremity, no edema to bilateral lower extremities    Pulmonary:      Effort: Pulmonary effort is normal.   Musculoskeletal:         General: No swelling.      Right foot: Normal range of motion. No deformity.      Left foot: Normal range of motion. No deformity.      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     5/5 muscle strength with DF/PF/Inv/Ev bilateral.    Inspection and palpation of the joints and bones reveal no crepitus or joint effusion. No tenderness upon palpation.  Angle and base of gait are normal.    Feet:      Right foot:      Skin integrity: No skin breakdown or erythema.      Left foot:      Skin integrity: No skin breakdown or erythema.   Skin:     General: Skin is warm and dry.      Capillary Refill: Capillary refill takes 2 to 3 seconds.      Findings: No erythema.      Nails: There is no clubbing.      Comments: Ulceration location:  Ulcerations bilateral 2nd MPJ right foot noted to have an ulceration to the muscle tissue with no sign of infection.  This ulceration measures 0.4 cm in diameter by 0.1 cm in depth.   Neurological:      Mental Status: He is alert and oriented to person, place, and time.      Sensory: Sensory deficit present.      Motor: No weakness.      Deep Tendon Reflexes:      Reflex Scores:       Patellar reflexes are 2+ on the right side and 2+ on the left side.       Achilles reflexes are 2+ on the right side and 2+ on the left side.     Comments: Diminished/loss of protective sensation all toes bilateral to 10 gram monofilament.   Psychiatric:         Mood and Affect: Mood normal.         Behavior: Behavior normal.         Thought Content: Thought content normal.     Wound size unchanged compared to last visit.       "  Assessment:       Encounter Diagnoses   Name Primary?    Type II diabetes mellitus with neurological manifestations Yes    Foot ulcer, right, with fat layer exposed                Plan:       Billy was seen today for wound care.    Diagnoses and all orders for this visit:    Type II diabetes mellitus with neurological manifestations    Foot ulcer, right, with fat layer exposed            Wound Debridement/post debridement    Performed by: Mingo Melara DPM   Authorized by: Patient    Time out: Immediately prior to procedure a "time out" was called to verify the correct patient, procedure, equipment, support staff and site/side marked as required.   Consent Done?:  Yes (Verbal)  Local anesthesia used?: No       Wound Details:    Location:  right foot     Type of Debridement:  Excisional       Length (cm):  0.5       Width (cm):  0.5       Depth (cm):  0.2       Percent Debrided (%):  100             Depth of debridement:  Subcutaneous tissue/muscle tissue    Tissue debrided:  Dermis, Epidermis and Subcutaneous    Devitalized tissue debrided:  Biofilm, Callus and Necrotic/Eschar    Instruments:  Blade, Curette and Nippers     Bleeding:  Minimal  Hemostasis Achieved: Yes    Method Used:  Pressure  Patient tolerance:  Patient tolerated the procedure well with no immediate complications    Wound care discussed in detail, football dressing applied today with Iodosorb.  Follow-up in 1 week for re-evaluation.  Darco shoe dispensed.    "

## 2023-04-12 NOTE — TELEPHONE ENCOUNTER
----- Message from Caitlin Contreras sent at 4/12/2023  9:20 AM CDT -----  Regarding: call back  Contact: ramsey [phone]  Ramsey calling regards to orders that was fax an orders  needs to be sign a dated please call to discuss further

## 2023-04-17 ENCOUNTER — TELEPHONE (OUTPATIENT)
Dept: PODIATRY | Facility: CLINIC | Age: 58
End: 2023-04-17
Payer: COMMERCIAL

## 2023-04-17 NOTE — TELEPHONE ENCOUNTER
Received and signed detailed written order. Faxed back to [facility] at [phone].

## 2023-04-18 ENCOUNTER — TELEPHONE (OUTPATIENT)
Dept: PODIATRY | Facility: CLINIC | Age: 58
End: 2023-04-18
Payer: COMMERCIAL

## 2023-04-18 NOTE — TELEPHONE ENCOUNTER
----- Message from Tarik Beebe sent at 4/18/2023  1:09 PM CDT -----  Regarding: Order  Contact: @[phone]  [facility] (Shama) Fax over detail written order and is  F/U on that order. Faxed quite a few times have not received it back would like to know the status of the order. If there is any questions please call @[phone]

## 2023-04-20 ENCOUNTER — TELEPHONE (OUTPATIENT)
Dept: PODIATRY | Facility: CLINIC | Age: 58
End: 2023-04-20
Payer: COMMERCIAL

## 2023-04-20 ENCOUNTER — OFFICE VISIT (OUTPATIENT)
Dept: PODIATRY | Facility: CLINIC | Age: 58
End: 2023-04-20
Payer: COMMERCIAL

## 2023-04-20 VITALS
HEART RATE: 57 BPM | DIASTOLIC BLOOD PRESSURE: 74 MMHG | SYSTOLIC BLOOD PRESSURE: 123 MMHG | HEIGHT: 74 IN | WEIGHT: 294 LBS | BODY MASS INDEX: 37.73 KG/M2 | RESPIRATION RATE: 18 BRPM

## 2023-04-20 DIAGNOSIS — L97.522 FOOT ULCER, LEFT, WITH FAT LAYER EXPOSED: ICD-10-CM

## 2023-04-20 DIAGNOSIS — E11.49 TYPE II DIABETES MELLITUS WITH NEUROLOGICAL MANIFESTATIONS: Primary | ICD-10-CM

## 2023-04-20 DIAGNOSIS — L97.512 FOOT ULCER, RIGHT, WITH FAT LAYER EXPOSED: ICD-10-CM

## 2023-04-20 PROCEDURE — 3074F PR MOST RECENT SYSTOLIC BLOOD PRESSURE < 130 MM HG: ICD-10-PCS | Mod: CPTII,S$GLB,, | Performed by: PODIATRIST

## 2023-04-20 PROCEDURE — 99999 PR PBB SHADOW E&M-EST. PATIENT-LVL V: CPT | Mod: PBBFAC,,, | Performed by: PODIATRIST

## 2023-04-20 PROCEDURE — 3066F NEPHROPATHY DOC TX: CPT | Mod: CPTII,S$GLB,, | Performed by: PODIATRIST

## 2023-04-20 PROCEDURE — 3060F PR POS MICROALBUMINURIA RESULT DOCUMENTED/REVIEW: ICD-10-PCS | Mod: CPTII,S$GLB,, | Performed by: PODIATRIST

## 2023-04-20 PROCEDURE — 4010F ACE/ARB THERAPY RXD/TAKEN: CPT | Mod: CPTII,S$GLB,, | Performed by: PODIATRIST

## 2023-04-20 PROCEDURE — 3078F PR MOST RECENT DIASTOLIC BLOOD PRESSURE < 80 MM HG: ICD-10-PCS | Mod: CPTII,S$GLB,, | Performed by: PODIATRIST

## 2023-04-20 PROCEDURE — 99999 PR PBB SHADOW E&M-EST. PATIENT-LVL V: ICD-10-PCS | Mod: PBBFAC,,, | Performed by: PODIATRIST

## 2023-04-20 PROCEDURE — 99213 PR OFFICE/OUTPT VISIT, EST, LEVL III, 20-29 MIN: ICD-10-PCS | Mod: 25,S$GLB,, | Performed by: PODIATRIST

## 2023-04-20 PROCEDURE — 3046F PR MOST RECENT HEMOGLOBIN A1C LEVEL > 9.0%: ICD-10-PCS | Mod: CPTII,S$GLB,, | Performed by: PODIATRIST

## 2023-04-20 PROCEDURE — 11042 PR DEBRIDEMENT, SKIN, SUB-Q TISSUE,=<20 SQ CM: ICD-10-PCS | Mod: 59,S$GLB,, | Performed by: PODIATRIST

## 2023-04-20 PROCEDURE — 3046F HEMOGLOBIN A1C LEVEL >9.0%: CPT | Mod: CPTII,S$GLB,, | Performed by: PODIATRIST

## 2023-04-20 PROCEDURE — 3066F PR DOCUMENTATION OF TREATMENT FOR NEPHROPATHY: ICD-10-PCS | Mod: CPTII,S$GLB,, | Performed by: PODIATRIST

## 2023-04-20 PROCEDURE — 3060F POS MICROALBUMINURIA REV: CPT | Mod: CPTII,S$GLB,, | Performed by: PODIATRIST

## 2023-04-20 PROCEDURE — 4010F PR ACE/ARB THEARPY RXD/TAKEN: ICD-10-PCS | Mod: CPTII,S$GLB,, | Performed by: PODIATRIST

## 2023-04-20 PROCEDURE — 3078F DIAST BP <80 MM HG: CPT | Mod: CPTII,S$GLB,, | Performed by: PODIATRIST

## 2023-04-20 PROCEDURE — 99213 OFFICE O/P EST LOW 20 MIN: CPT | Mod: 25,S$GLB,, | Performed by: PODIATRIST

## 2023-04-20 PROCEDURE — 11042 DBRDMT SUBQ TIS 1ST 20SQCM/<: CPT | Mod: 59,S$GLB,, | Performed by: PODIATRIST

## 2023-04-20 PROCEDURE — 3008F PR BODY MASS INDEX (BMI) DOCUMENTED: ICD-10-PCS | Mod: CPTII,S$GLB,, | Performed by: PODIATRIST

## 2023-04-20 PROCEDURE — 3074F SYST BP LT 130 MM HG: CPT | Mod: CPTII,S$GLB,, | Performed by: PODIATRIST

## 2023-04-20 PROCEDURE — 3008F BODY MASS INDEX DOCD: CPT | Mod: CPTII,S$GLB,, | Performed by: PODIATRIST

## 2023-04-27 ENCOUNTER — OFFICE VISIT (OUTPATIENT)
Dept: PODIATRY | Facility: CLINIC | Age: 58
End: 2023-04-27
Payer: COMMERCIAL

## 2023-04-27 VITALS
RESPIRATION RATE: 18 BRPM | SYSTOLIC BLOOD PRESSURE: 115 MMHG | BODY MASS INDEX: 37.73 KG/M2 | HEIGHT: 74 IN | DIASTOLIC BLOOD PRESSURE: 71 MMHG | WEIGHT: 294 LBS | HEART RATE: 51 BPM

## 2023-04-27 DIAGNOSIS — L97.512 FOOT ULCER, RIGHT, WITH FAT LAYER EXPOSED: ICD-10-CM

## 2023-04-27 DIAGNOSIS — E11.49 TYPE II DIABETES MELLITUS WITH NEUROLOGICAL MANIFESTATIONS: Primary | ICD-10-CM

## 2023-04-27 DIAGNOSIS — L97.522 FOOT ULCER, LEFT, WITH FAT LAYER EXPOSED: ICD-10-CM

## 2023-04-27 PROCEDURE — 11042 DBRDMT SUBQ TIS 1ST 20SQCM/<: CPT | Mod: S$GLB,,, | Performed by: PODIATRIST

## 2023-04-27 PROCEDURE — 99999 PR PBB SHADOW E&M-EST. PATIENT-LVL V: CPT | Mod: PBBFAC,,, | Performed by: PODIATRIST

## 2023-04-27 PROCEDURE — 11042 PR DEBRIDEMENT, SKIN, SUB-Q TISSUE,=<20 SQ CM: ICD-10-PCS | Mod: S$GLB,,, | Performed by: PODIATRIST

## 2023-04-27 PROCEDURE — 99499 NO LOS: ICD-10-PCS | Mod: S$GLB,,, | Performed by: PODIATRIST

## 2023-04-27 PROCEDURE — 99499 UNLISTED E&M SERVICE: CPT | Mod: S$GLB,,, | Performed by: PODIATRIST

## 2023-04-27 PROCEDURE — 99999 PR PBB SHADOW E&M-EST. PATIENT-LVL V: ICD-10-PCS | Mod: PBBFAC,,, | Performed by: PODIATRIST

## 2023-05-04 ENCOUNTER — OFFICE VISIT (OUTPATIENT)
Dept: PODIATRY | Facility: CLINIC | Age: 58
End: 2023-05-04
Payer: COMMERCIAL

## 2023-05-04 VITALS
BODY MASS INDEX: 37.73 KG/M2 | SYSTOLIC BLOOD PRESSURE: 150 MMHG | DIASTOLIC BLOOD PRESSURE: 88 MMHG | WEIGHT: 294 LBS | HEART RATE: 62 BPM | HEIGHT: 74 IN

## 2023-05-04 DIAGNOSIS — L97.512 FOOT ULCER, RIGHT, WITH FAT LAYER EXPOSED: ICD-10-CM

## 2023-05-04 DIAGNOSIS — E11.49 TYPE II DIABETES MELLITUS WITH NEUROLOGICAL MANIFESTATIONS: Primary | ICD-10-CM

## 2023-05-04 PROCEDURE — 11042 DBRDMT SUBQ TIS 1ST 20SQCM/<: CPT | Mod: RT,S$GLB,, | Performed by: PODIATRIST

## 2023-05-04 PROCEDURE — 99999 PR PBB SHADOW E&M-EST. PATIENT-LVL IV: CPT | Mod: PBBFAC,,, | Performed by: PODIATRIST

## 2023-05-04 PROCEDURE — 3079F PR MOST RECENT DIASTOLIC BLOOD PRESSURE 80-89 MM HG: ICD-10-PCS | Mod: CPTII,S$GLB,, | Performed by: PODIATRIST

## 2023-05-04 PROCEDURE — 99213 PR OFFICE/OUTPT VISIT, EST, LEVL III, 20-29 MIN: ICD-10-PCS | Mod: 25,S$GLB,, | Performed by: PODIATRIST

## 2023-05-04 PROCEDURE — 3008F PR BODY MASS INDEX (BMI) DOCUMENTED: ICD-10-PCS | Mod: CPTII,S$GLB,, | Performed by: PODIATRIST

## 2023-05-04 PROCEDURE — 3079F DIAST BP 80-89 MM HG: CPT | Mod: CPTII,S$GLB,, | Performed by: PODIATRIST

## 2023-05-04 PROCEDURE — 3060F POS MICROALBUMINURIA REV: CPT | Mod: CPTII,S$GLB,, | Performed by: PODIATRIST

## 2023-05-04 PROCEDURE — 3077F SYST BP >= 140 MM HG: CPT | Mod: CPTII,S$GLB,, | Performed by: PODIATRIST

## 2023-05-04 PROCEDURE — 3046F HEMOGLOBIN A1C LEVEL >9.0%: CPT | Mod: CPTII,S$GLB,, | Performed by: PODIATRIST

## 2023-05-04 PROCEDURE — 4010F ACE/ARB THERAPY RXD/TAKEN: CPT | Mod: CPTII,S$GLB,, | Performed by: PODIATRIST

## 2023-05-04 PROCEDURE — 3077F PR MOST RECENT SYSTOLIC BLOOD PRESSURE >= 140 MM HG: ICD-10-PCS | Mod: CPTII,S$GLB,, | Performed by: PODIATRIST

## 2023-05-04 PROCEDURE — 4010F PR ACE/ARB THEARPY RXD/TAKEN: ICD-10-PCS | Mod: CPTII,S$GLB,, | Performed by: PODIATRIST

## 2023-05-04 PROCEDURE — 3060F PR POS MICROALBUMINURIA RESULT DOCUMENTED/REVIEW: ICD-10-PCS | Mod: CPTII,S$GLB,, | Performed by: PODIATRIST

## 2023-05-04 PROCEDURE — 11042 PR DEBRIDEMENT, SKIN, SUB-Q TISSUE,=<20 SQ CM: ICD-10-PCS | Mod: RT,S$GLB,, | Performed by: PODIATRIST

## 2023-05-04 PROCEDURE — 99213 OFFICE O/P EST LOW 20 MIN: CPT | Mod: 25,S$GLB,, | Performed by: PODIATRIST

## 2023-05-04 PROCEDURE — 3066F NEPHROPATHY DOC TX: CPT | Mod: CPTII,S$GLB,, | Performed by: PODIATRIST

## 2023-05-04 PROCEDURE — 3066F PR DOCUMENTATION OF TREATMENT FOR NEPHROPATHY: ICD-10-PCS | Mod: CPTII,S$GLB,, | Performed by: PODIATRIST

## 2023-05-04 PROCEDURE — 1159F PR MEDICATION LIST DOCUMENTED IN MEDICAL RECORD: ICD-10-PCS | Mod: CPTII,S$GLB,, | Performed by: PODIATRIST

## 2023-05-04 PROCEDURE — 3046F PR MOST RECENT HEMOGLOBIN A1C LEVEL > 9.0%: ICD-10-PCS | Mod: CPTII,S$GLB,, | Performed by: PODIATRIST

## 2023-05-04 PROCEDURE — 1159F MED LIST DOCD IN RCRD: CPT | Mod: CPTII,S$GLB,, | Performed by: PODIATRIST

## 2023-05-04 PROCEDURE — 99999 PR PBB SHADOW E&M-EST. PATIENT-LVL IV: ICD-10-PCS | Mod: PBBFAC,,, | Performed by: PODIATRIST

## 2023-05-04 PROCEDURE — 3008F BODY MASS INDEX DOCD: CPT | Mod: CPTII,S$GLB,, | Performed by: PODIATRIST

## 2023-05-04 NOTE — PROGRESS NOTES
Subjective:      Patient ID: Billy Rivera is a 57 y.o. male.    Chief Complaint: Wound Care      Billy is a 57 y.o. male who presents to the clinic for evaluation and treatment of high risk feet. Billy has a past medical history of Allergy, CAD (coronary artery disease), native coronary artery (6/25/2013), Cancer, COVID-19 virus detected (9/1/2020), Diabetes mellitus, Diabetes mellitus, type 2, Disorder of kidney and ureter, Heart attack (04/2012), colon cancer, stage I, Hyperlipidemia, Hypertension, Muscular pain, NSTEMI May 2013 - peak troponin 0.22 (5/22/2013), MICHAELA (obstructive sleep apnea), and Retinopathy due to secondary diabetes.  He has been regular shoe gear with a new issue of breakdown of previous ulceration right foot below the 2nd toe joint as well as been recently measured for diabetic shoe gear.  Patient presents for follow-up wound care.  PCP: BRAD Baker MD    Date Last Seen by PCP: 3/23/2022    Current shoe gear:  Affected Foot: DARCO Shoe right foot     Affected Foot: Tennis shoe left foot    History of Trauma: negative  Sign of Infection: none    Hemoglobin A1C   Date Value Ref Range Status   04/06/2023 9.4 (H) 4.0 - 5.6 % Final     Comment:     ADA Screening Guidelines:  5.7-6.4%  Consistent with prediabetes  >or=6.5%  Consistent with diabetes    High levels of fetal hemoglobin interfere with the HbA1C  assay. Heterozygous hemoglobin variants (HbS, HgC, etc)do  not significantly interfere with this assay.   However, presence of multiple variants may affect accuracy.     11/10/2022 7.1 (H) 4.0 - 5.6 % Final     Comment:     ADA Screening Guidelines:  5.7-6.4%  Consistent with prediabetes  >or=6.5%  Consistent with diabetes    High levels of fetal hemoglobin interfere with the HbA1C  assay. Heterozygous hemoglobin variants (HbS, HgC, etc)do  not significantly interfere with this assay.   However, presence of multiple variants may affect accuracy.     08/09/2022 8.0 (H) 4.0 -  5.6 % Final     Comment:     ADA Screening Guidelines:  5.7-6.4%  Consistent with prediabetes  >or=6.5%  Consistent with diabetes    High levels of fetal hemoglobin interfere with the HbA1C  assay. Heterozygous hemoglobin variants (HbS, HgC, etc)do  not significantly interfere with this assay.   However, presence of multiple variants may affect accuracy.         Review of Systems   Constitutional: Negative for chills, decreased appetite, fever, malaise/fatigue and night sweats.   HENT:  Negative for congestion, hearing loss, nosebleeds and tinnitus.    Eyes:  Negative for double vision, pain, photophobia and visual disturbance.   Cardiovascular:  Negative for chest pain, claudication, cyanosis and leg swelling.   Respiratory:  Negative for cough, hemoptysis, shortness of breath and wheezing.    Endocrine: Negative for cold intolerance and heat intolerance.   Hematologic/Lymphatic: Negative for adenopathy and bleeding problem.   Skin:  Positive for color change, dry skin, nail changes, poor wound healing and suspicious lesions. Negative for itching and rash.   Musculoskeletal:  Negative for arthritis, falls, gout, joint pain, myalgias and stiffness.   Gastrointestinal:  Negative for abdominal pain, jaundice, nausea and vomiting.   Genitourinary:  Negative for dysuria, frequency and hematuria.   Neurological:  Positive for numbness and sensory change. Negative for difficulty with concentration, loss of balance and paresthesias.   Psychiatric/Behavioral:  Negative for altered mental status, hallucinations and suicidal ideas. The patient is not nervous/anxious.    Allergic/Immunologic: Negative for environmental allergies and persistent infections.         Objective:        Physical Exam  Constitutional:       General: He is not in acute distress.     Appearance: Normal appearance. He is well-developed.   HENT:      Head: Normocephalic and atraumatic.   Cardiovascular:      Pulses:           Dorsalis pedis pulses are 2+  on the right side and 2+ on the left side.        Posterior tibial pulses are 2+ on the right side and 2+ on the left side.      Comments: CFT< 3 secs all toes bilateral foot, skin temp warm to cool bilateral foot, no hair growth bilateral lower extremity, no edema to bilateral lower extremities    Pulmonary:      Effort: Pulmonary effort is normal.   Musculoskeletal:         General: No swelling.      Right foot: Normal range of motion. No deformity.      Left foot: Normal range of motion. No deformity.      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     5/5 muscle strength with DF/PF/Inv/Ev bilateral.    Inspection and palpation of the joints and bones reveal no crepitus or joint effusion. No tenderness upon palpation.  Angle and base of gait are normal.    Feet:      Right foot:      Skin integrity: No skin breakdown or erythema.      Left foot:      Skin integrity: No skin breakdown or erythema.   Skin:     General: Skin is warm and dry.      Capillary Refill: Capillary refill takes 2 to 3 seconds.      Findings: No erythema.      Nails: There is no clubbing.      Comments: Ulceration location:  Ulcerations bilateral 2nd MPJ right foot noted to have an ulceration to the muscle tissue with no sign of infection.  This ulceration measures 0.4 cm in diameter by 0.1 cm in depth.  Unchanged since last visit.   Neurological:      Mental Status: He is alert and oriented to person, place, and time.      Sensory: Sensory deficit present.      Motor: No weakness.      Deep Tendon Reflexes:      Reflex Scores:       Patellar reflexes are 2+ on the right side and 2+ on the left side.       Achilles reflexes are 2+ on the right side and 2+ on the left side.     Comments: Diminished/loss of protective sensation all toes bilateral to 10 gram monofilament.   Psychiatric:         Mood and Affect: Mood normal.         Behavior: Behavior normal.         Thought Content: Thought content  "normal.     Wound size unchanged compared to last visit.        Assessment:       Encounter Diagnoses   Name Primary?    Type II diabetes mellitus with neurological manifestations Yes    Foot ulcer, right, with fat layer exposed                Plan:       Billy was seen today for wound care.    Diagnoses and all orders for this visit:    Type II diabetes mellitus with neurological manifestations    Foot ulcer, right, with fat layer exposed            Wound Debridement/post debridement    Performed by: Mingo Melara DPM   Authorized by: Patient    Time out: Immediately prior to procedure a "time out" was called to verify the correct patient, procedure, equipment, support staff and site/side marked as required.   Consent Done?:  Yes (Verbal)  Local anesthesia used?: No       Wound Details:    Location:  right foot     Type of Debridement:  Excisional       Length (cm):  0.4       Width (cm):  0.4       Depth (cm):  0.1       Percent Debrided (%):  100             Depth of debridement:  Subcutaneous tissue/muscle tissue    Tissue debrided:  Dermis, Epidermis and Subcutaneous    Devitalized tissue debrided:  Biofilm, Callus and Necrotic/Eschar    Instruments:  Blade, Curette and Nippers     Bleeding:  Minimal  Hemostasis Achieved: Yes    Method Used:  Pressure  Patient tolerance:  Patient tolerated the procedure well with no immediate complications    Wound care discussed in detail, football dressing applied today with Iodosorb with football dressing.  Follow-up in 1 week for re-evaluation.  Darco shoe dispensed.      "

## 2023-05-09 ENCOUNTER — PATIENT OUTREACH (OUTPATIENT)
Dept: ADMINISTRATIVE | Facility: HOSPITAL | Age: 58
End: 2023-05-09
Payer: COMMERCIAL

## 2023-05-09 NOTE — PROGRESS NOTES
Health Maintenance Due   Topic Date Due    Eye Exam  08/15/2020    Shingles Vaccine (2 of 2) 11/23/2020    COVID-19 Vaccine (3 - Booster for Moderna series) 06/23/2021    PROSTATE-SPECIFIC ANTIGEN  01/26/2023     Chart reviewed.   Immunizations: Reconciled  Orders placed: N/A  Upcoming appts to satisfy [facility] topics: Labs 6/30/2023

## 2023-05-09 NOTE — PROGRESS NOTES
Subjective:      Patient ID: Billy Rivera is a 57 y.o. male.    Chief Complaint: No chief complaint on file.    Billy is a 57 y.o. male who presents to the clinic for evaluation and treatment of high risk feet. Billy has a past medical history of Allergy, CAD (coronary artery disease), native coronary artery (6/25/2013), Cancer, COVID-19 virus detected (9/1/2020), Diabetes mellitus, Diabetes mellitus, type 2, Disorder of kidney and ureter, Heart attack (04/2012), colon cancer, stage I, Hyperlipidemia, Hypertension, Muscular pain, NSTEMI May 2013 - peak troponin 0.22 (5/22/2013), MICHAELA (obstructive sleep apnea), and Retinopathy due to secondary diabetes. The patient's chief complaint is foot ulcer both feet. Recently had R 5th ray amp with subsequent dehiscence and wound along incision. No new concerns.  Doing well after wound closure, follow-up wound care.       no new complaints today.    PCP: BRAD Baker MD    Date Last Seen by PCP: 3/23/2022    Current shoe gear:  Affected Foot: DARCO Shoe right foot     Affected Foot: Tennis shoe left foot    History of Trauma: negative  Sign of Infection: none    Hemoglobin A1C   Date Value Ref Range Status   06/08/2022 10.0 (H) 4.0 - 5.6 % Final     Comment:     ADA Screening Guidelines:  5.7-6.4%  Consistent with prediabetes  >or=6.5%  Consistent with diabetes    High levels of fetal hemoglobin interfere with the HbA1C  assay. Heterozygous hemoglobin variants (HbS, HgC, etc)do  not significantly interfere with this assay.   However, presence of multiple variants may affect accuracy.     01/26/2022 12.2 (H) 4.0 - 5.6 % Final     Comment:     ADA Screening Guidelines:  5.7-6.4%  Consistent with prediabetes  >or=6.5%  Consistent with diabetes    High levels of fetal hemoglobin interfere with the HbA1C  assay. Heterozygous hemoglobin variants (HbS, HgC, etc)do  not significantly interfere with this assay.   However, presence of multiple variants may affect  Last Visit Date: 4/24/2023   Next Visit Date: 5/22/2023 accuracy.     12/22/2020 10.5 (H) 4.0 - 5.6 % Final     Comment:     ADA Screening Guidelines:  5.7-6.4%  Consistent with prediabetes  >or=6.5%  Consistent with diabetes  High levels of fetal hemoglobin interfere with the HbA1C  assay. Heterozygous hemoglobin variants (HbS, HgC, etc)do  not significantly interfere with this assay.   However, presence of multiple variants may affect accuracy.         Review of Systems   Constitutional: Negative for chills, decreased appetite, fever, malaise/fatigue and night sweats.   HENT: Negative for congestion, hearing loss, nosebleeds and tinnitus.    Eyes: Negative for double vision, pain, photophobia and visual disturbance.   Cardiovascular: Negative for chest pain, claudication, cyanosis and leg swelling.   Respiratory: Negative for cough, hemoptysis, shortness of breath and wheezing.    Endocrine: Negative for cold intolerance and heat intolerance.   Hematologic/Lymphatic: Negative for adenopathy and bleeding problem.   Skin: Positive for poor wound healing. Negative for color change, dry skin, itching, nail changes, rash and suspicious lesions.   Musculoskeletal: Negative for arthritis, falls, gout, joint pain, myalgias and stiffness.   Gastrointestinal: Negative for abdominal pain, jaundice, nausea and vomiting.   Genitourinary: Negative for dysuria, frequency and hematuria.   Neurological: Positive for numbness and sensory change. Negative for difficulty with concentration, loss of balance and paresthesias.   Psychiatric/Behavioral: Negative for altered mental status, hallucinations and suicidal ideas. The patient is not nervous/anxious.    Allergic/Immunologic: Negative for environmental allergies and persistent infections.           Objective:        Physical Exam  Constitutional:       General: He is not in acute distress.     Appearance: Normal appearance.   Cardiovascular:      Pulses:           Dorsalis pedis pulses are 2+ on the right side and 2+ on the left side.         Posterior tibial pulses are 2+ on the right side and 2+ on the left side.      Comments: CFT< 3 secs all toes bilateral foot, skin temp warm to cool bilateral foot, no hair growth bilateral lower extremity, no edema to bilateral lower extremities    Musculoskeletal:         General: No swelling.      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     5/5 muscle strength with DF/PF/Inv/Ev bilateral.    Inspection and palpation of the joints and bones reveal no crepitus or joint effusion. No tenderness upon palpation.  Angle and base of gait are normal.    Skin:     General: Skin is warm and dry.      Capillary Refill: Capillary refill takes 2 to 3 seconds.      Findings: No erythema.      Comments: Ulceration location: left foot plantar 1st ray amputation stump reopened  Measurement 0.8 x 0.2 x 0.1cm   Signs of infection: none.   No malodor, no erythema  Drainage: Serosanguinous  Purulence: no  Crepitus/fluctuance: no  Periwound: Macerated, Calloused  Base: Granular  Depth: subcutaneous tissue  Probe to bone: no    Dorsal lateral R 5th ray amputation site, healing well.      All wounds with granular base with overlying biofilm.    Neurological:      Mental Status: He is alert and oriented to person, place, and time.      Sensory: Sensory deficit present.      Motor: No weakness.      Comments: Diminished/loss of protective sensation all toes bilateral to 10 gram monofilament.   Psychiatric:         Mood and Affect: Mood normal.         Thought Content: Thought content normal.               Assessment:       Encounter Diagnoses   Name Primary?    Type II diabetes mellitus with neurological manifestations Yes    Foot ulcer, right, with fat layer exposed     Foot ulcer, left, with fat layer exposed          Plan:       Diagnoses and all orders for this visit:    Type II diabetes mellitus with neurological manifestations    Foot ulcer, right, with fat layer  "exposed    Foot ulcer, left, with fat layer exposed      I counseled the patient on his conditions, their implications and medical management.    Wound Debridement    Performed by: Mingo Melara DPM   Authorized by: Patient    Time out: Immediately prior to procedure a "time out" was called to verify the correct patient, procedure, equipment, support staff and site/side marked as required.   Consent Done?:  Yes (Verbal)  Local anesthesia used?: No       Wound Details:    Location:   plantar left foot     Type of Debridement:  Excisional       Length (cm):  0.8       Width (cm):   0.2       Depth (cm):   0.1       Percent Debrided (%):  100             Depth of debridement:  Subcutaneous tissue    Tissue debrided:  Dermis, Epidermis and Subcutaneous    Devitalized tissue debrided:  Biofilm, Callus and Necrotic/Eschar    Instruments:  Blade, Curette and Nippers     Bleeding:  Minimal  Hemostasis Achieved: Yes    Method Used:  Pressure  Patient tolerance:  Patient tolerated the procedure well with no immediate complications      Football dressing applied to the left, wound care discussed in detail.  Follow-up in 1 week.    "

## 2023-05-11 ENCOUNTER — OFFICE VISIT (OUTPATIENT)
Dept: PODIATRY | Facility: CLINIC | Age: 58
End: 2023-05-11
Payer: COMMERCIAL

## 2023-05-11 VITALS
WEIGHT: 294.13 LBS | DIASTOLIC BLOOD PRESSURE: 99 MMHG | SYSTOLIC BLOOD PRESSURE: 155 MMHG | BODY MASS INDEX: 37.75 KG/M2 | HEIGHT: 74 IN | HEART RATE: 73 BPM

## 2023-05-11 DIAGNOSIS — M79.671 RIGHT FOOT PAIN: ICD-10-CM

## 2023-05-11 DIAGNOSIS — L97.512 FOOT ULCER, RIGHT, WITH FAT LAYER EXPOSED: ICD-10-CM

## 2023-05-11 DIAGNOSIS — E11.49 TYPE II DIABETES MELLITUS WITH NEUROLOGICAL MANIFESTATIONS: Primary | ICD-10-CM

## 2023-05-11 PROCEDURE — 4010F PR ACE/ARB THEARPY RXD/TAKEN: ICD-10-PCS | Mod: CPTII,S$GLB,, | Performed by: PODIATRIST

## 2023-05-11 PROCEDURE — 3008F PR BODY MASS INDEX (BMI) DOCUMENTED: ICD-10-PCS | Mod: CPTII,S$GLB,, | Performed by: PODIATRIST

## 2023-05-11 PROCEDURE — 3080F PR MOST RECENT DIASTOLIC BLOOD PRESSURE >= 90 MM HG: ICD-10-PCS | Mod: CPTII,S$GLB,, | Performed by: PODIATRIST

## 2023-05-11 PROCEDURE — 99213 OFFICE O/P EST LOW 20 MIN: CPT | Mod: 25,S$GLB,, | Performed by: PODIATRIST

## 2023-05-11 PROCEDURE — 11043 DBRDMT MUSC&/FSCA 1ST 20/<: CPT | Mod: RT,S$GLB,, | Performed by: PODIATRIST

## 2023-05-11 PROCEDURE — 99999 PR PBB SHADOW E&M-EST. PATIENT-LVL IV: CPT | Mod: PBBFAC,,, | Performed by: PODIATRIST

## 2023-05-11 PROCEDURE — 3077F PR MOST RECENT SYSTOLIC BLOOD PRESSURE >= 140 MM HG: ICD-10-PCS | Mod: CPTII,S$GLB,, | Performed by: PODIATRIST

## 2023-05-11 PROCEDURE — 99213 PR OFFICE/OUTPT VISIT, EST, LEVL III, 20-29 MIN: ICD-10-PCS | Mod: 25,S$GLB,, | Performed by: PODIATRIST

## 2023-05-11 PROCEDURE — 11043 PR DEBRIDEMENT, SKIN, SUB-Q TISSUE,MUSCLE,=<20 SQ CM: ICD-10-PCS | Mod: RT,S$GLB,, | Performed by: PODIATRIST

## 2023-05-11 PROCEDURE — 3060F POS MICROALBUMINURIA REV: CPT | Mod: CPTII,S$GLB,, | Performed by: PODIATRIST

## 2023-05-11 PROCEDURE — 3080F DIAST BP >= 90 MM HG: CPT | Mod: CPTII,S$GLB,, | Performed by: PODIATRIST

## 2023-05-11 PROCEDURE — 3046F PR MOST RECENT HEMOGLOBIN A1C LEVEL > 9.0%: ICD-10-PCS | Mod: CPTII,S$GLB,, | Performed by: PODIATRIST

## 2023-05-11 PROCEDURE — 3060F PR POS MICROALBUMINURIA RESULT DOCUMENTED/REVIEW: ICD-10-PCS | Mod: CPTII,S$GLB,, | Performed by: PODIATRIST

## 2023-05-11 PROCEDURE — 3046F HEMOGLOBIN A1C LEVEL >9.0%: CPT | Mod: CPTII,S$GLB,, | Performed by: PODIATRIST

## 2023-05-11 PROCEDURE — 3066F NEPHROPATHY DOC TX: CPT | Mod: CPTII,S$GLB,, | Performed by: PODIATRIST

## 2023-05-11 PROCEDURE — 4010F ACE/ARB THERAPY RXD/TAKEN: CPT | Mod: CPTII,S$GLB,, | Performed by: PODIATRIST

## 2023-05-11 PROCEDURE — 3008F BODY MASS INDEX DOCD: CPT | Mod: CPTII,S$GLB,, | Performed by: PODIATRIST

## 2023-05-11 PROCEDURE — 99999 PR PBB SHADOW E&M-EST. PATIENT-LVL IV: ICD-10-PCS | Mod: PBBFAC,,, | Performed by: PODIATRIST

## 2023-05-11 PROCEDURE — 3066F PR DOCUMENTATION OF TREATMENT FOR NEPHROPATHY: ICD-10-PCS | Mod: CPTII,S$GLB,, | Performed by: PODIATRIST

## 2023-05-11 PROCEDURE — 3077F SYST BP >= 140 MM HG: CPT | Mod: CPTII,S$GLB,, | Performed by: PODIATRIST

## 2023-05-18 ENCOUNTER — OFFICE VISIT (OUTPATIENT)
Dept: PODIATRY | Facility: CLINIC | Age: 58
End: 2023-05-18
Payer: COMMERCIAL

## 2023-05-18 ENCOUNTER — PATIENT OUTREACH (OUTPATIENT)
Dept: ADMINISTRATIVE | Facility: HOSPITAL | Age: 58
End: 2023-05-18
Payer: COMMERCIAL

## 2023-05-18 VITALS
BODY MASS INDEX: 37.63 KG/M2 | HEIGHT: 74 IN | SYSTOLIC BLOOD PRESSURE: 124 MMHG | WEIGHT: 293.19 LBS | HEART RATE: 54 BPM | DIASTOLIC BLOOD PRESSURE: 72 MMHG

## 2023-05-18 DIAGNOSIS — E11.49 TYPE II DIABETES MELLITUS WITH NEUROLOGICAL MANIFESTATIONS: Primary | ICD-10-CM

## 2023-05-18 DIAGNOSIS — L97.512 FOOT ULCER, RIGHT, WITH FAT LAYER EXPOSED: ICD-10-CM

## 2023-05-18 DIAGNOSIS — M79.671 RIGHT FOOT PAIN: ICD-10-CM

## 2023-05-18 PROCEDURE — 3078F DIAST BP <80 MM HG: CPT | Mod: CPTII,S$GLB,, | Performed by: PODIATRIST

## 2023-05-18 PROCEDURE — 99213 OFFICE O/P EST LOW 20 MIN: CPT | Mod: 25,S$GLB,, | Performed by: PODIATRIST

## 2023-05-18 PROCEDURE — 3074F PR MOST RECENT SYSTOLIC BLOOD PRESSURE < 130 MM HG: ICD-10-PCS | Mod: CPTII,S$GLB,, | Performed by: PODIATRIST

## 2023-05-18 PROCEDURE — 3008F BODY MASS INDEX DOCD: CPT | Mod: CPTII,S$GLB,, | Performed by: PODIATRIST

## 2023-05-18 PROCEDURE — 3066F PR DOCUMENTATION OF TREATMENT FOR NEPHROPATHY: ICD-10-PCS | Mod: CPTII,S$GLB,, | Performed by: PODIATRIST

## 2023-05-18 PROCEDURE — 99999 PR PBB SHADOW E&M-EST. PATIENT-LVL IV: ICD-10-PCS | Mod: PBBFAC,,, | Performed by: PODIATRIST

## 2023-05-18 PROCEDURE — 3046F HEMOGLOBIN A1C LEVEL >9.0%: CPT | Mod: CPTII,S$GLB,, | Performed by: PODIATRIST

## 2023-05-18 PROCEDURE — 3078F PR MOST RECENT DIASTOLIC BLOOD PRESSURE < 80 MM HG: ICD-10-PCS | Mod: CPTII,S$GLB,, | Performed by: PODIATRIST

## 2023-05-18 PROCEDURE — 4010F ACE/ARB THERAPY RXD/TAKEN: CPT | Mod: CPTII,S$GLB,, | Performed by: PODIATRIST

## 2023-05-18 PROCEDURE — 11042 PR DEBRIDEMENT, SKIN, SUB-Q TISSUE,=<20 SQ CM: ICD-10-PCS | Mod: RT,S$GLB,, | Performed by: PODIATRIST

## 2023-05-18 PROCEDURE — 3008F PR BODY MASS INDEX (BMI) DOCUMENTED: ICD-10-PCS | Mod: CPTII,S$GLB,, | Performed by: PODIATRIST

## 2023-05-18 PROCEDURE — 3046F PR MOST RECENT HEMOGLOBIN A1C LEVEL > 9.0%: ICD-10-PCS | Mod: CPTII,S$GLB,, | Performed by: PODIATRIST

## 2023-05-18 PROCEDURE — 3060F POS MICROALBUMINURIA REV: CPT | Mod: CPTII,S$GLB,, | Performed by: PODIATRIST

## 2023-05-18 PROCEDURE — 11042 DBRDMT SUBQ TIS 1ST 20SQCM/<: CPT | Mod: RT,S$GLB,, | Performed by: PODIATRIST

## 2023-05-18 PROCEDURE — 3060F PR POS MICROALBUMINURIA RESULT DOCUMENTED/REVIEW: ICD-10-PCS | Mod: CPTII,S$GLB,, | Performed by: PODIATRIST

## 2023-05-18 PROCEDURE — 3074F SYST BP LT 130 MM HG: CPT | Mod: CPTII,S$GLB,, | Performed by: PODIATRIST

## 2023-05-18 PROCEDURE — 3066F NEPHROPATHY DOC TX: CPT | Mod: CPTII,S$GLB,, | Performed by: PODIATRIST

## 2023-05-18 PROCEDURE — 99213 PR OFFICE/OUTPT VISIT, EST, LEVL III, 20-29 MIN: ICD-10-PCS | Mod: 25,S$GLB,, | Performed by: PODIATRIST

## 2023-05-18 PROCEDURE — 4010F PR ACE/ARB THEARPY RXD/TAKEN: ICD-10-PCS | Mod: CPTII,S$GLB,, | Performed by: PODIATRIST

## 2023-05-18 PROCEDURE — 99999 PR PBB SHADOW E&M-EST. PATIENT-LVL IV: CPT | Mod: PBBFAC,,, | Performed by: PODIATRIST

## 2023-05-19 NOTE — PROGRESS NOTES
Subjective:     Patient ID: Billy Rivera is a 57 y.o. male.    Chief Complaint: Wound Care (Bilateral foot ulcers ) and Dressing Change (Football Rt foot )    Billy is a 57 y.o. male who presents to the clinic for evaluation and treatment of high risk feet. Billy has a past medical history of Allergy, CAD (coronary artery disease), native coronary artery (6/25/2013), Cancer, COVID-19 virus detected (9/1/2020), Diabetes mellitus, Diabetes mellitus, type 2, Disorder of kidney and ureter, Heart attack (04/2012), colon cancer, stage I, Hyperlipidemia, Hypertension, Muscular pain, NSTEMI May 2013 - peak troponin 0.22 (5/22/2013), MICHAELA (obstructive sleep apnea), and Retinopathy due to secondary diabetes. The patient's chief complaint is diabetic foot ulcer, R. Pt under care of Dr Melara. This patient has documented high risk feet requiring routine maintenance secondary to diabetes mellitis and those secondary complications of diabetes, as mentioned..    PCP: BRAD Baker MD    Date Last Seen by PCP:   Chief Complaint   Patient presents with    Wound Care     Bilateral foot ulcers     Dressing Change     Football Rt foot        Hemoglobin A1C   Date Value Ref Range Status   04/06/2023 9.4 (H) 4.0 - 5.6 % Final     Comment:     ADA Screening Guidelines:  5.7-6.4%  Consistent with prediabetes  >or=6.5%  Consistent with diabetes    High levels of fetal hemoglobin interfere with the HbA1C  assay. Heterozygous hemoglobin variants (HbS, HgC, etc)do  not significantly interfere with this assay.   However, presence of multiple variants may affect accuracy.     11/10/2022 7.1 (H) 4.0 - 5.6 % Final     Comment:     ADA Screening Guidelines:  5.7-6.4%  Consistent with prediabetes  >or=6.5%  Consistent with diabetes    High levels of fetal hemoglobin interfere with the HbA1C  assay. Heterozygous hemoglobin variants (HbS, HgC, etc)do  not significantly interfere with this assay.   However, presence of multiple variants  "may affect accuracy.     08/09/2022 8.0 (H) 4.0 - 5.6 % Final     Comment:     ADA Screening Guidelines:  5.7-6.4%  Consistent with prediabetes  >or=6.5%  Consistent with diabetes    High levels of fetal hemoglobin interfere with the HbA1C  assay. Heterozygous hemoglobin variants (HbS, HgC, etc)do  not significantly interfere with this assay.   However, presence of multiple variants may affect accuracy.         Review of Systems   Constitutional: Negative for chills, decreased appetite and fever.   Cardiovascular:  Negative for leg swelling.   Musculoskeletal:  Negative for arthritis, joint pain, joint swelling and myalgias.   Gastrointestinal:  Negative for nausea and vomiting.   Neurological:  Negative for loss of balance, numbness and paresthesias.      Objective:     Vitals:    04/20/23 0904   BP: 123/74   Pulse: (!) 57   Resp: 18   Weight: 133.4 kg (294 lb)   Height: 6' 2" (1.88 m)   PainSc: 0-No pain        Physical Exam  Constitutional:       Appearance: He is well-developed.   Cardiovascular:      Comments: There is decreased digital hair. Skin is atrophic, slightly hyperpigmented, and mildly edematous      Musculoskeletal:         General: No tenderness. Normal range of motion.      Comments: Adequate joint range of motion without pain, limitation, nor crepitation Bilateral feet and ankle joints. Muscle strength is 5/5 in all groups bilaterally.         Skin:     General: Skin is warm and dry.      Coloration: Skin is ashen.      Findings: No ecchymosis, erythema or lesion.      Comments:      Neurological:      Mental Status: He is alert and oriented to person, place, and time.      Comments: Mobile-Roge 5.07 monofilamant testing is diminished Vinicio feet. Sharp/dull sensation diminished Bilaterally. Light touch absent Bilaterally.       Psychiatric:         Behavior: Behavior normal.         Ulcer location:  left, plantar foot   Pre debridement Measurements: 0 cm   Post debridement Measurements : " 0.7x0.2x0.1 cm   Signs of infection: No  Erythema: No  Undermining: No  Tunneling: No  Drainage: None  Periwound skin: intact and hyperkeratotic  Wound Base: granular     Ulcer location:  left, lateral plantar foot   Pre debridement Measurements: 0.1x0.x0.1 cm   Post debridement Measurements : 0.5x0.7x0.1 cm   Signs of infection: No  Erythema: No  Undermining: No  Tunneling: No  Drainage: Bloody  Periwound skin: intact  Wound Base: granular      Ulcer location:  right, 2nd toe, Sub MPJ   Pre debridement Measurements: 1.0x0.5x0.1 cm   Post debridement Measurements : 1.5x0.8x0.2 cm   Signs of infection: No  Erythema: No  Undermining: No  Tunneling: No  Drainage: Bloody  Periwound skin: intact and macerated  Wound Base: granular    Assessment:      Encounter Diagnoses   Name Primary?    Type II diabetes mellitus with neurological manifestations Yes    Foot ulcer, right, with fat layer exposed     Foot ulcer, left, with fat layer exposed      Plan:     Billy was seen today for wound care and dressing change.    Diagnoses and all orders for this visit:    Type II diabetes mellitus with neurological manifestations    Foot ulcer, right, with fat layer exposed    Foot ulcer, left, with fat layer exposed      I counseled the patient on his conditions, their implications and medical management.    The nature of the condition, options for management, as well as potential risks and complications were discussed in detail with patient. Patient was amenable to my recommendations and left my office fully informed and will follow up as instructed or sooner if necessary.    New wound(s) noted L foot     Reviewed current medication(s) and lab(s), specific to foot ailment(s) with patient in detail. All questions answered     Independent review of patients previous clinical notes    Debridement: With verbal consent, nonviable tissues on the bilateral foot were debrided beyond sub q utilizing a  sterile No. 3 scalpel and forceps.  Minimal bleeding controlled with direct pressure  The patient tolerated this well.     Dressings: Leticia  Offloading:Foam    Follow-up:Patient is to return to the clinic in 1 week  for follow-up but should call [facility] immediately if any signs of infection, such as fever, chills, sweats, increased redness or pain.    Darlene Natarajan MA assisted w/ application of football dressing under my direct supervision. Darco shoe applied to offload the area. Advised patient that this should be worn on the affected foot at all times when ambulating     Short-term goals include maintaining good offloading and minimizing bioburden, promoting granulation and epithelialization to healing.  Long-term goals include keeping the wound healed by good offloading and medical management under the direction of internist.       .

## 2023-05-20 NOTE — PROGRESS NOTES
Subjective:      Patient ID: Billy Rivera is a 57 y.o. male.    Chief Complaint: Wound Check (bilateral) and Diabetes Mellitus (PCP- 04/06/2023/Akua Powell NP)      Billy is a 57 y.o. male who presents to the clinic for evaluation and treatment of high risk feet. Billy has a past medical history of Allergy, CAD (coronary artery disease), native coronary artery (6/25/2013), Cancer, COVID-19 virus detected (9/1/2020), Diabetes mellitus, Diabetes mellitus, type 2, Disorder of kidney and ureter, Heart attack (04/2012), colon cancer, stage I, Hyperlipidemia, Hypertension, Muscular pain, NSTEMI May 2013 - peak troponin 0.22 (5/22/2013), MICHAELA (obstructive sleep apnea), and Retinopathy due to secondary diabetes.  He has been regular shoe gear with a new issue of breakdown of previous ulceration right foot below the 2nd toe joint as well as been recently measured for diabetic shoe gear.  Patient presents for follow-up wound care.  PCP: BRAD Baker MD    Date Last Seen by PCP: 3/23/2022    Current shoe gear:  Affected Foot: DARCO Shoe right foot     Affected Foot: Tennis shoe left foot    History of Trauma: negative  Sign of Infection: none    Hemoglobin A1C   Date Value Ref Range Status   04/06/2023 9.4 (H) 4.0 - 5.6 % Final     Comment:     ADA Screening Guidelines:  5.7-6.4%  Consistent with prediabetes  >or=6.5%  Consistent with diabetes    High levels of fetal hemoglobin interfere with the HbA1C  assay. Heterozygous hemoglobin variants (HbS, HgC, etc)do  not significantly interfere with this assay.   However, presence of multiple variants may affect accuracy.     11/10/2022 7.1 (H) 4.0 - 5.6 % Final     Comment:     ADA Screening Guidelines:  5.7-6.4%  Consistent with prediabetes  >or=6.5%  Consistent with diabetes    High levels of fetal hemoglobin interfere with the HbA1C  assay. Heterozygous hemoglobin variants (HbS, HgC, etc)do  not significantly interfere with this assay.   However, presence  of multiple variants may affect accuracy.     08/09/2022 8.0 (H) 4.0 - 5.6 % Final     Comment:     ADA Screening Guidelines:  5.7-6.4%  Consistent with prediabetes  >or=6.5%  Consistent with diabetes    High levels of fetal hemoglobin interfere with the HbA1C  assay. Heterozygous hemoglobin variants (HbS, HgC, etc)do  not significantly interfere with this assay.   However, presence of multiple variants may affect accuracy.         Review of Systems   Constitutional: Negative for chills, decreased appetite, fever, malaise/fatigue and night sweats.   HENT:  Negative for congestion, hearing loss, nosebleeds and tinnitus.    Eyes:  Negative for double vision, pain, photophobia and visual disturbance.   Cardiovascular:  Negative for chest pain, claudication, cyanosis and leg swelling.   Respiratory:  Negative for cough, hemoptysis, shortness of breath and wheezing.    Endocrine: Negative for cold intolerance and heat intolerance.   Hematologic/Lymphatic: Negative for adenopathy and bleeding problem.   Skin:  Positive for color change, dry skin, nail changes, poor wound healing and suspicious lesions. Negative for itching and rash.   Musculoskeletal:  Negative for arthritis, falls, gout, joint pain, myalgias and stiffness.   Gastrointestinal:  Negative for abdominal pain, jaundice, nausea and vomiting.   Genitourinary:  Negative for dysuria, frequency and hematuria.   Neurological:  Positive for numbness and sensory change. Negative for difficulty with concentration, loss of balance and paresthesias.   Psychiatric/Behavioral:  Negative for altered mental status, hallucinations and suicidal ideas. The patient is not nervous/anxious.    Allergic/Immunologic: Negative for environmental allergies and persistent infections.         Objective:        Physical Exam  Constitutional:       General: He is not in acute distress.     Appearance: Normal appearance. He is well-developed.   HENT:      Head: Normocephalic and  atraumatic.   Cardiovascular:      Pulses:           Dorsalis pedis pulses are 2+ on the right side and 2+ on the left side.        Posterior tibial pulses are 2+ on the right side and 2+ on the left side.      Comments: CFT< 3 secs all toes bilateral foot, skin temp warm to cool bilateral foot, no hair growth bilateral lower extremity, no edema to bilateral lower extremities    Pulmonary:      Effort: Pulmonary effort is normal.   Musculoskeletal:         General: No swelling.      Right foot: Normal range of motion. No deformity.      Left foot: Normal range of motion. No deformity.      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     5/5 muscle strength with DF/PF/Inv/Ev bilateral.    Inspection and palpation of the joints and bones reveal no crepitus or joint effusion. No tenderness upon palpation.  Angle and base of gait are normal.    Feet:      Right foot:      Skin integrity: No skin breakdown or erythema.      Left foot:      Skin integrity: No skin breakdown or erythema.   Skin:     General: Skin is warm and dry.      Capillary Refill: Capillary refill takes 2 to 3 seconds.      Findings: No erythema.      Nails: There is no clubbing.      Comments: Ulceration location:  Ulcerations bilateral 2nd MPJ right foot noted to have an ulceration to the muscle tissue with no sign of infection.  This ulceration measures 0.3 cm in diameter by 0.1 cm in depth, post debridement.   Neurological:      Mental Status: He is alert and oriented to person, place, and time.      Sensory: Sensory deficit present.      Motor: No weakness.      Deep Tendon Reflexes:      Reflex Scores:       Patellar reflexes are 2+ on the right side and 2+ on the left side.       Achilles reflexes are 2+ on the right side and 2+ on the left side.     Comments: Diminished/loss of protective sensation all toes bilateral to 10 gram monofilament.   Psychiatric:         Mood and Affect: Mood normal.        "  Behavior: Behavior normal.         Thought Content: Thought content normal.     Wound size unchanged compared to last visit.        Assessment:       Encounter Diagnoses   Name Primary?    Type II diabetes mellitus with neurological manifestations Yes    Foot ulcer, right, with fat layer exposed     Right foot pain                Plan:       Billy was seen today for wound check and diabetes mellitus.    Diagnoses and all orders for this visit:    Type II diabetes mellitus with neurological manifestations    Foot ulcer, right, with fat layer exposed    Right foot pain            Wound Debridement/post debridement    Performed by: Mingo Melara DPM   Authorized by: Patient    Time out: Immediately prior to procedure a "time out" was called to verify the correct patient, procedure, equipment, support staff and site/side marked as required.   Consent Done?:  Yes (Verbal)  Local anesthesia used?: No       Wound Details:    Location:  right foot     Type of Debridement:  Excisional       Length (cm):  0.3       Width (cm):  0.3       Depth (cm):  0.1       Percent Debrided (%):  100             Depth of debridement:  Subcutaneous tissue/muscle tissue    Tissue debrided:  Dermis, Epidermis and Subcutaneous    Devitalized tissue debrided:  Biofilm, Callus and Necrotic/Eschar    Instruments:  Blade, Curette and Nippers     Bleeding:  Minimal  Hemostasis Achieved: Yes    Method Used:  Pressure  Patient tolerance:  Patient tolerated the procedure well with no immediate complications    Wound care discussed in detail, football dressing applied today with Iodosorb with football dressing.  Follow-up in 1 week for re-evaluation.  Darco shoe dispensed.        "

## 2023-05-22 NOTE — PROGRESS NOTES
Subjective:     Patient ID: Billy Rivera is a 57 y.o. male.    Chief Complaint: Wound Care (Bilateral foot ulcers ) and Dressing Change (Football )    Billy is a 57 y.o. male who presents to the clinic for evaluation and treatment of high risk feet. Billy has a past medical history of Allergy, CAD (coronary artery disease), native coronary artery (6/25/2013), Cancer, COVID-19 virus detected (9/1/2020), Diabetes mellitus, Diabetes mellitus, type 2, Disorder of kidney and ureter, Heart attack (04/2012), colon cancer, stage I, Hyperlipidemia, Hypertension, Muscular pain, NSTEMI May 2013 - peak troponin 0.22 (5/22/2013), MICHAELA (obstructive sleep apnea), and Retinopathy due to secondary diabetes. The patient's chief complaint is diabetic foot ulcer, R. Pt under care of Dr Melara. This patient has documented high risk feet requiring routine maintenance secondary to diabetes mellitis and those secondary complications of diabetes, as mentioned..    4/27/23: Patient has been in  b/l football dressing, ambulating in Darco shoe x 1 week with out issues     PCP: BRAD Baker MD    Date Last Seen by PCP:   Chief Complaint   Patient presents with    Wound Care     Bilateral foot ulcers     Dressing Change     Football        Hemoglobin A1C   Date Value Ref Range Status   04/06/2023 9.4 (H) 4.0 - 5.6 % Final     Comment:     ADA Screening Guidelines:  5.7-6.4%  Consistent with prediabetes  >or=6.5%  Consistent with diabetes    High levels of fetal hemoglobin interfere with the HbA1C  assay. Heterozygous hemoglobin variants (HbS, HgC, etc)do  not significantly interfere with this assay.   However, presence of multiple variants may affect accuracy.     11/10/2022 7.1 (H) 4.0 - 5.6 % Final     Comment:     ADA Screening Guidelines:  5.7-6.4%  Consistent with prediabetes  >or=6.5%  Consistent with diabetes    High levels of fetal hemoglobin interfere with the HbA1C  assay. Heterozygous hemoglobin variants (HbS, HgC,  "etc)do  not significantly interfere with this assay.   However, presence of multiple variants may affect accuracy.     08/09/2022 8.0 (H) 4.0 - 5.6 % Final     Comment:     ADA Screening Guidelines:  5.7-6.4%  Consistent with prediabetes  >or=6.5%  Consistent with diabetes    High levels of fetal hemoglobin interfere with the HbA1C  assay. Heterozygous hemoglobin variants (HbS, HgC, etc)do  not significantly interfere with this assay.   However, presence of multiple variants may affect accuracy.         Review of Systems   Constitutional: Negative for chills, decreased appetite and fever.   Cardiovascular:  Negative for leg swelling.   Skin:  Positive for dry skin.   Musculoskeletal:  Negative for arthritis, joint pain, joint swelling and myalgias.   Gastrointestinal:  Negative for nausea and vomiting.   Neurological:  Negative for loss of balance, numbness and paresthesias.      Objective:     Vitals:    04/27/23 0850   BP: 115/71   Pulse: (!) 51   Resp: 18   Weight: 133.4 kg (294 lb)   Height: 6' 2" (1.88 m)   PainSc: 0-No pain        Physical Exam  Constitutional:       Appearance: He is well-developed.   Cardiovascular:      Comments: There is decreased digital hair. Skin is atrophic, slightly hyperpigmented, and mildly edematous      Musculoskeletal:         General: No tenderness. Normal range of motion.      Comments: Adequate joint range of motion without pain, limitation, nor crepitation Bilateral feet and ankle joints. Muscle strength is 5/5 in all groups bilaterally.         Skin:     General: Skin is warm and dry.      Coloration: Skin is ashen.      Findings: No ecchymosis, erythema or lesion.      Comments:      Neurological:      Mental Status: He is alert and oriented to person, place, and time.      Comments: Moore-Roge 5.07 monofilamant testing is diminished Vinicio feet. Sharp/dull sensation diminished Bilaterally. Light touch absent Bilaterally.       Psychiatric:         Behavior: Behavior " normal.         Ulcer location:  left, plantar foot   Pre debridement Measurements: 0 cm   Post debridement Measurements : 0.7x0.2x0.1 cm   Signs of infection: No  Erythema: No  Undermining: No  Tunneling: No  Drainage: None  Periwound skin: intact and hyperkeratotic  Wound Base: granular     Ulcer location:  left, lateral plantar foot   Pre debridement Measurements: 0.1x0.x0.1 cm   Post debridement Measurements : 0.5x0.7x0.1 cm   Signs of infection: No  Erythema: No  Undermining: No  Tunneling: No  Drainage: Bloody  Periwound skin: intact  Wound Base: granular      Ulcer location:  right, 2nd toe, Sub MPJ   Pre debridement Measurements: 1.0x0.5x0.1 cm   Post debridement Measurements : 1.5x0.8x0.2 cm   Signs of infection: No  Erythema: No  Undermining: No  Tunneling: No  Drainage: Bloody  Periwound skin: intact and macerated  Wound Base: granular  ------------------------------------------------------------------------------------------------------------------    Ulcer location:  right, plantar foot   Pre debridement Measurements: 0.8x0.5x0.1 cm   Post debridement Measurements : 0.9x0.5x0.2 cm   Signs of infection: No  Erythema: No  Undermining: No  Tunneling: No  Drainage: None  Periwound skin: intact  Wound Base: granular    Ulcer location:  left, plantar foot   Pre debridement Measurements: 0.2x0.5x0.1 cm   Post debridement Measurements : 0.3x07x0.1 cm   Signs of infection: No  Erythema: No  Undermining: No  Tunneling: No  Drainage: None  Periwound skin: intact  Wound Base: granular      Healed L foot.     Assessment:      Encounter Diagnoses   Name Primary?    Type II diabetes mellitus with neurological manifestations Yes    Foot ulcer, right, with fat layer exposed     Foot ulcer, left, with fat layer exposed      Plan:     Billy was seen today for wound care and dressing change.    Diagnoses and all orders for this visit:    Type II diabetes mellitus with neurological manifestations    Foot ulcer, right,  with fat layer exposed    Foot ulcer, left, with fat layer exposed      I counseled the patient on his conditions, their implications and medical management.    The nature of the condition, options for management, as well as potential risks and complications were discussed in detail with patient. Patient was amenable to my recommendations and left my office fully informed and will follow up as instructed or sooner if necessary.      Reviewed current medication(s) and lab(s), specific to foot ailment(s) with patient in detail. All questions answered         Debridement: With verbal consent, nonviable tissues on the bilateral foot were debrided beyond sub q utilizing a  sterile No. 3 scalpel and forceps. Minimal bleeding controlled with direct pressure  The patient tolerated this well.     Dressings: Leticia  Offloading:Foam    Follow-up:Patient is to return to the clinic in 1 week  for follow-up but should call [facility] immediately if any signs of infection, such as fever, chills, sweats, increased redness or pain.    Darlene Natarajan MA assisted w/ application of football dressing under my direct supervision. Darco shoe applied to offload the area. Advised patient that this should be worn on the affected foot at all times when ambulating     Short-term goals include maintaining good offloading and minimizing bioburden, promoting granulation and epithelialization to healing.  Long-term goals include keeping the wound healed by good offloading and medical management under the direction of internist.       .

## 2023-05-23 ENCOUNTER — OFFICE VISIT (OUTPATIENT)
Dept: INTERNAL MEDICINE | Facility: CLINIC | Age: 58
End: 2023-05-23
Payer: COMMERCIAL

## 2023-05-23 VITALS
WEIGHT: 292.75 LBS | OXYGEN SATURATION: 97 % | SYSTOLIC BLOOD PRESSURE: 130 MMHG | DIASTOLIC BLOOD PRESSURE: 90 MMHG | BODY MASS INDEX: 37.57 KG/M2 | HEART RATE: 62 BPM | TEMPERATURE: 98 F | HEIGHT: 74 IN | RESPIRATION RATE: 16 BRPM

## 2023-05-23 DIAGNOSIS — R80.9 PROTEINURIA, UNSPECIFIED TYPE: ICD-10-CM

## 2023-05-23 DIAGNOSIS — E11.29 DIABETES MELLITUS WITH MICROALBUMINURIA: ICD-10-CM

## 2023-05-23 DIAGNOSIS — L97.403 DIABETIC ULCER OF HEEL ASSOCIATED WITH TYPE 2 DIABETES MELLITUS, WITH NECROSIS OF MUSCLE, UNSPECIFIED LATERALITY: ICD-10-CM

## 2023-05-23 DIAGNOSIS — S98.132A AMPUTATION OF TOE OF LEFT FOOT: ICD-10-CM

## 2023-05-23 DIAGNOSIS — E11.3393 TYPE 2 DIABETES MELLITUS WITH BOTH EYES AFFECTED BY MODERATE NONPROLIFERATIVE RETINOPATHY WITHOUT MACULAR EDEMA, WITH LONG-TERM CURRENT USE OF INSULIN: ICD-10-CM

## 2023-05-23 DIAGNOSIS — I25.810 CORONARY ARTERY DISEASE INVOLVING CORONARY BYPASS GRAFT OF NATIVE HEART WITHOUT ANGINA PECTORIS: ICD-10-CM

## 2023-05-23 DIAGNOSIS — E11.621 DIABETIC ULCER OF HEEL ASSOCIATED WITH TYPE 2 DIABETES MELLITUS, WITH NECROSIS OF MUSCLE, UNSPECIFIED LATERALITY: ICD-10-CM

## 2023-05-23 DIAGNOSIS — I15.2 HYPERTENSION ASSOCIATED WITH DIABETES: Chronic | ICD-10-CM

## 2023-05-23 DIAGNOSIS — Z79.4 TYPE 2 DIABETES MELLITUS WITH BOTH EYES AFFECTED BY MODERATE NONPROLIFERATIVE RETINOPATHY WITHOUT MACULAR EDEMA, WITH LONG-TERM CURRENT USE OF INSULIN: ICD-10-CM

## 2023-05-23 DIAGNOSIS — R80.9 DIABETES MELLITUS WITH MICROALBUMINURIA: ICD-10-CM

## 2023-05-23 DIAGNOSIS — E11.59 HYPERTENSION ASSOCIATED WITH DIABETES: Chronic | ICD-10-CM

## 2023-05-23 DIAGNOSIS — E11.51 TYPE 2 DIABETES MELLITUS WITH DIABETIC PERIPHERAL ANGIOPATHY WITHOUT GANGRENE, WITH LONG-TERM CURRENT USE OF INSULIN: Primary | Chronic | ICD-10-CM

## 2023-05-23 DIAGNOSIS — Z96.41 INSULIN PUMP IN PLACE: ICD-10-CM

## 2023-05-23 DIAGNOSIS — Z79.4 TYPE 2 DIABETES MELLITUS WITH DIABETIC PERIPHERAL ANGIOPATHY WITHOUT GANGRENE, WITH LONG-TERM CURRENT USE OF INSULIN: Primary | Chronic | ICD-10-CM

## 2023-05-23 LAB — GLUCOSE SERPL-MCNC: 348 MG/DL (ref 70–110)

## 2023-05-23 PROCEDURE — 82962 GLUCOSE BLOOD TEST: CPT | Mod: S$GLB,,, | Performed by: NURSE PRACTITIONER

## 2023-05-23 PROCEDURE — 1159F MED LIST DOCD IN RCRD: CPT | Mod: CPTII,S$GLB,, | Performed by: NURSE PRACTITIONER

## 2023-05-23 PROCEDURE — 82962 POCT GLUCOSE, HAND-HELD DEVICE: ICD-10-PCS | Mod: S$GLB,,, | Performed by: NURSE PRACTITIONER

## 2023-05-23 PROCEDURE — 3060F POS MICROALBUMINURIA REV: CPT | Mod: CPTII,S$GLB,, | Performed by: NURSE PRACTITIONER

## 2023-05-23 PROCEDURE — 1160F PR REVIEW ALL MEDS BY PRESCRIBER/CLIN PHARMACIST DOCUMENTED: ICD-10-PCS | Mod: CPTII,S$GLB,, | Performed by: NURSE PRACTITIONER

## 2023-05-23 PROCEDURE — 1159F PR MEDICATION LIST DOCUMENTED IN MEDICAL RECORD: ICD-10-PCS | Mod: CPTII,S$GLB,, | Performed by: NURSE PRACTITIONER

## 2023-05-23 PROCEDURE — 4010F ACE/ARB THERAPY RXD/TAKEN: CPT | Mod: CPTII,S$GLB,, | Performed by: NURSE PRACTITIONER

## 2023-05-23 PROCEDURE — 3046F PR MOST RECENT HEMOGLOBIN A1C LEVEL > 9.0%: ICD-10-PCS | Mod: CPTII,S$GLB,, | Performed by: NURSE PRACTITIONER

## 2023-05-23 PROCEDURE — 99214 PR OFFICE/OUTPT VISIT, EST, LEVL IV, 30-39 MIN: ICD-10-PCS | Mod: S$GLB,,, | Performed by: NURSE PRACTITIONER

## 2023-05-23 PROCEDURE — 3066F PR DOCUMENTATION OF TREATMENT FOR NEPHROPATHY: ICD-10-PCS | Mod: CPTII,S$GLB,, | Performed by: NURSE PRACTITIONER

## 2023-05-23 PROCEDURE — 99214 OFFICE O/P EST MOD 30 MIN: CPT | Mod: S$GLB,,, | Performed by: NURSE PRACTITIONER

## 2023-05-23 PROCEDURE — 3060F PR POS MICROALBUMINURIA RESULT DOCUMENTED/REVIEW: ICD-10-PCS | Mod: CPTII,S$GLB,, | Performed by: NURSE PRACTITIONER

## 2023-05-23 PROCEDURE — 3080F DIAST BP >= 90 MM HG: CPT | Mod: CPTII,S$GLB,, | Performed by: NURSE PRACTITIONER

## 2023-05-23 PROCEDURE — 3066F NEPHROPATHY DOC TX: CPT | Mod: CPTII,S$GLB,, | Performed by: NURSE PRACTITIONER

## 2023-05-23 PROCEDURE — 3075F SYST BP GE 130 - 139MM HG: CPT | Mod: CPTII,S$GLB,, | Performed by: NURSE PRACTITIONER

## 2023-05-23 PROCEDURE — 3046F HEMOGLOBIN A1C LEVEL >9.0%: CPT | Mod: CPTII,S$GLB,, | Performed by: NURSE PRACTITIONER

## 2023-05-23 PROCEDURE — 3008F BODY MASS INDEX DOCD: CPT | Mod: CPTII,S$GLB,, | Performed by: NURSE PRACTITIONER

## 2023-05-23 PROCEDURE — 99999 PR PBB SHADOW E&M-EST. PATIENT-LVL IV: CPT | Mod: PBBFAC,,, | Performed by: NURSE PRACTITIONER

## 2023-05-23 PROCEDURE — 3075F PR MOST RECENT SYSTOLIC BLOOD PRESS GE 130-139MM HG: ICD-10-PCS | Mod: CPTII,S$GLB,, | Performed by: NURSE PRACTITIONER

## 2023-05-23 PROCEDURE — 4010F PR ACE/ARB THEARPY RXD/TAKEN: ICD-10-PCS | Mod: CPTII,S$GLB,, | Performed by: NURSE PRACTITIONER

## 2023-05-23 PROCEDURE — 3008F PR BODY MASS INDEX (BMI) DOCUMENTED: ICD-10-PCS | Mod: CPTII,S$GLB,, | Performed by: NURSE PRACTITIONER

## 2023-05-23 PROCEDURE — 99999 PR PBB SHADOW E&M-EST. PATIENT-LVL IV: ICD-10-PCS | Mod: PBBFAC,,, | Performed by: NURSE PRACTITIONER

## 2023-05-23 PROCEDURE — 3080F PR MOST RECENT DIASTOLIC BLOOD PRESSURE >= 90 MM HG: ICD-10-PCS | Mod: CPTII,S$GLB,, | Performed by: NURSE PRACTITIONER

## 2023-05-23 PROCEDURE — 1160F RVW MEDS BY RX/DR IN RCRD: CPT | Mod: CPTII,S$GLB,, | Performed by: NURSE PRACTITIONER

## 2023-05-23 RX ORDER — SEMAGLUTIDE 1.34 MG/ML
1 INJECTION, SOLUTION SUBCUTANEOUS WEEKLY
Qty: 1 EACH | Refills: 0 | Status: SHIPPED | OUTPATIENT
Start: 2023-05-23 | End: 2023-05-23

## 2023-05-23 RX ORDER — SEMAGLUTIDE 0.68 MG/ML
0.5 INJECTION, SOLUTION SUBCUTANEOUS
Qty: 3 ML | Refills: 6 | Status: SHIPPED | OUTPATIENT
Start: 2023-05-23 | End: 2023-05-23 | Stop reason: SDUPTHER

## 2023-05-23 RX ORDER — BLOOD-GLUCOSE SENSOR
1 EACH MISCELLANEOUS
Qty: 2 EACH | Refills: 11 | Status: SHIPPED | OUTPATIENT
Start: 2023-05-23 | End: 2023-07-20

## 2023-05-23 RX ORDER — SEMAGLUTIDE 0.68 MG/ML
0.5 INJECTION, SOLUTION SUBCUTANEOUS
Qty: 3 ML | Refills: 0 | Status: SHIPPED | OUTPATIENT
Start: 2023-05-23 | End: 2023-05-23 | Stop reason: SDUPTHER

## 2023-05-23 RX ORDER — LISINOPRIL 40 MG/1
40 TABLET ORAL DAILY
Qty: 90 TABLET | Refills: 3 | Status: SHIPPED | OUTPATIENT
Start: 2023-05-23 | End: 2024-04-03 | Stop reason: SDUPTHER

## 2023-05-23 RX ORDER — BLOOD-GLUCOSE SENSOR
1 EACH MISCELLANEOUS
Qty: 3 EACH | Refills: 11 | Status: SHIPPED | OUTPATIENT
Start: 2023-05-23 | End: 2023-05-23

## 2023-05-23 RX ORDER — INSULIN ASPART INJECTION 100 [IU]/ML
100 INJECTION, SOLUTION SUBCUTANEOUS CONTINUOUS
Qty: 60 ML | Refills: 6 | Status: SHIPPED | OUTPATIENT
Start: 2023-05-23 | End: 2023-12-18 | Stop reason: SDUPTHER

## 2023-05-23 RX ORDER — SEMAGLUTIDE 0.68 MG/ML
0.5 INJECTION, SOLUTION SUBCUTANEOUS
Qty: 3 ML | Refills: 6 | Status: SHIPPED | OUTPATIENT
Start: 2023-05-23 | End: 2023-12-05 | Stop reason: SDUPTHER

## 2023-05-23 RX ORDER — DAPAGLIFLOZIN 5 MG/1
5 TABLET, FILM COATED ORAL DAILY
Qty: 30 TABLET | Refills: 3 | Status: SHIPPED | OUTPATIENT
Start: 2023-05-23 | End: 2023-07-20

## 2023-05-23 NOTE — PATIENT INSTRUCTIONS
Restart Farxiga 5mg tablet daily.   Start ozempic once per week injection - 0.25mg every week for at least 2 weeks, then increase to 0.5mg weekly thereafter and stay on this dose.     For low blood sugar - remember to keep glucose tablets on hand. You can purchase these at the pharmacy check out desk/over the counter.   If blood sugar is less than 70, take/eat 2 - 3 tablets quickly to bring your sugar up.   Always keep 15 grams of Quick acting carbohydrates on hand to eat/drink if your sugar is low - examples are 1/2 cup of juice, coke, or crackers, granola bars.   Remember to eat meals frequently to prevent low blood blood sugars.

## 2023-05-23 NOTE — PROGRESS NOTES
57 y.o. male here for routine 2 month follow up for T2dm management   Last seen march 2023 - those notes below.     A1c heading back up - a1c now up to 9.4% -   He cannot afford glp1 or sglt2i - he cannot qualify for patient assistance.   He;'s done both trulicity and farxiga last year with EXCELLENT results. We finally got his a1c to goal of 7.1% and were able to decrease his insulin dosing...     Has new insurance plan now - [employer] through wife's work -   So he wants to try and get some of  his medications he needs now.   Continues on metformin 1000 bid.   Also on medtronic 770 insulin pump - using humulin R U100 from [employer] since his insurance wants to charge him high amounts for vials of novolog.     Downloaded - and reviewed -   Total daily dose is 81.5 units/day -   48% is coming from basal rate.   52% is coming from bolus -   Changes set every 4 days.   Active insulin time is 4 hours.   00:0 1.75 units/hour.   07:00 1.6 units/hour  Carb ratio 1: 6   ISF set at 25 -   Bg target 110 - 120   He has preset boluses 5 units for a snack, 15 units for a meal.     No cgm.   Not checking bg's sugars regularly -       Last visit notes as follows from March 2023:   57 y.o. male here for routine follow up for T2dm -   Last seen in august 2022 - those notes are below.     His a1c came down finally from 10% to 7.1% - he did great.   However he is having issues with cost of his medications still - he is a  and his wife works at [employer].   Has insurance through his wife's work.     No new a1c for today, but in January was at 7.1% - so he was so so happy.   He continues on Metformin 1000mg bid   Trulicity 1.5mg sc weekly was working great - but he stopped it taking it January 2023 - b/c he can't afford.   B/c his deductible is $5000 so he can't pay for it.   Was on farxiga 5 mg tablet daily - worked great his a1c went down - but now he cannot afford it b/c it's $300 b/c of his deductible.  History of toe  "amputation --  Medtronic 770 insulin pump - manual mode  Basal rate at 1.6 units/hour - he is of note using Novolin R - gets vials from [employer] - in his pump - b/c he couldn't afford the other insulin.     Total daily dose 80.1 units/day -   48% coming from basal rate.   52% is coming from bolusing.   Vhanges every 3.7 days on average.   Active insulin time is 4 hours.   Bg target 110 - 120   ISF is at 25   Carb ratio is at 6   However he uses presets -   5 untis for snack.   15 units for meals  10 units for smaller meal.      He is checking his sugars with [employer] glucometer - [employer]- fingersticks -   His insurance would never cover a cgm -   He is checking daily - it's around 200 - 260's on average.   It gets higher in the evenings almost always -   He works as a  overnight so he's running high glucoses overnight essentially while eating.   He has no logs with him to show.   (Before when he was on trulicity and farxiga - it was 110 - 140s on average and he loved this).       Last visit notes from august 2022 as follows  57 y.o. male here for 5 month followup for management of t2dm -   Last seen march 2022 - those notes below.     a1c is decreased from 12.2% to 10% -   However he reports his glucoses are in the 90 - 150's.   He checks his sugars 1 - 2 times per day.   His blood sugar is level of 99 and this is fasting -   Admits he is not eating "as much" - smaller portions.   Is trying to limit his carbs, but admits eating what he can "get" -   We have tried to get him on cgm therapy for the past 1 - 2 years, but never can get approved/can't afford.   He prefers to fingerstick.   He denies any known hypoglycemic episodes.   States bg's vary over the past month from 90 - 211 at the highest.     Is on insulin continuously via pump   Continues on metformin 1000 mg twice per day -   Also on trulicity 1.5mg SC every week -    Denies any side effects from it - he is having normal bm's, denies " nausea/vomiting or abdominal pain.   No constipation -   He was estimated to have a high cost, however so stopped taking the trulicity for a couple months   Now the cost has gone down/He is actually getting from the pharmacy - and gets it for free from [facility].     Continues on medtronic 770 pump - downloaded and reviewed today -   See scanned in .   Using humalog in insulin pump -     In manual mode -   Total daily dose - 70.8 units/day -   64% is coming from basal rate.   36% is coming from bolusing -   Active insulin time is 4 hours.   He is giving/use presets - 10 or 15 units. Has a snack dose set for 5 units.    Basal rate is 1.9 units/hour -   Carb ratio of 1: 6 - however he is not carb counting   isf set at 25 -   bg target 110 - 120 -       Last OV from march 2022 as follows:   56 y.o. male here for 1 month follow up for management of T2dm -   Last seen 2/23/2022 - those notes are below.     a1c last @ 12.2%, however his bg's since last visit are coming down tremendously.   I began him on trulicity 0.75mg SC weekly at his last visit - he gives every Thursday.   States his glucoses have come down into the 150's on average.   I submitted for the dexcom G6 - but it was going to be $900+ dollars through [employer] - so he didn't get/use.   He did try a sample florentino 2 - he liked using it - however didn't bring the reader with him today -   He would like a cgm going forward if possible.   For now, continues to fingerstick.     He continues on metformin 1000 bid.   trulicity 0.75 weekly.   Also on insulin pump and I adjusted settings on his pump at last visit.     medtronic 770 -   He remains in manual mode -   Total daily dose of insulin is 77.3 units/day.   59% is coming from the basal rate.   41% is coming from the bolus.   chaning his set every 3 days.   Active insulin time is at 4 hours.   Giving preset boluses -  5 units - snack.   10 units - regular meal.   15 units - medium meal.   He  eats about 2 meals per day on average and snacks.   Sometimes bolusing just 2 times per day.     Settings:   Basal rate is at 1.9 units/hour.   Carb ratio is at 1: 56 -   ISF is at 1: 25   bg target is at 110 - 120.       He also has back up insulin - lantus for once per day.   Asking for refills for his bp and cholesterol meds.   Right foot ulcer - continues to heal.   Follows with podiatry - just started on cipro twice per day.       Last visit notes from 2/23/2022  56 y.o. gentleman, here for 2 week follow up for management of T2dm -   Last seen 2/9/2022 - those notes are below.     Historically uncontrolled - last a1c is @ 12.2%.   Had ulcers on foot, followed by staph infection, and then sustained a toe amputation.   On metformin 1000 bid.   I prescribed him farxiga - 5mg tablet daily - but he didn't start as the cost was too high.     Continues on [employer] 770 insulin pump - I had increased his rates at last visit -   He had been on novolin R in his pump as this was cheaper he found at [facility]. I switched him to humalog - gave voucher for $35 through [employer] - however stated it was still too expensive.   As his sugars running continuously high - we made adjustments to his pump. He feels this has helped somewhat.     Pump downloaded today -   Reviewed - see scanned in  -   100% in manual mode   TDD is 59.4 units/day   68% coming from basal rate.   32% coming from bolusing.   He does not carb count.   Uses preset boluses 5 units for a snack, 10 units for meal time boluses.   He boluses anywhere from 1 - 2 times per day on average in looking at his report.     Settings:   Active insulin time is 4 hours.   Basal rate is at 1.8 units/hour.   Carb ratio is 1: 6   ISF is at 1: 25   bg target is 110 - 125     He does not use cgm - states has high deductible.   Had tried to order him a guardian cgm in the past - to be closed loop with his pump, but he never did purchase.   He had a florentino Pro placed in  "office in December 2020 but did not continue on or use a personal.   He checks his glucose by fingerstick.   No logs with him today.   His sugar today in clinic is at 355. I showed him how to give a correction bolus on his pump and we walked through to use the bolus wizard together.   I had ordered him a dexcom at last visit however he never got b/c the cost was too high.   (it was $700 so he didn't  ).         Last visit notes from 2/9/2022 as follows:   56 y.o. male here for 1.5 year follow up -   Last seen 12/2020 - those notes are below.   That was our initial consult, and then he followed with diabetes education - has been lost to follow since.     a1c now up from 10.5% ---> 12.2%.   Historically uncontrolled glucoses and diabetes -   States "I feel great!"   However admits "feels tired just when he works long hours/shifts" - states he is working almost 7 days per week.   Lives at home with his wife.   Reports staph infection in his left foot worsened, - had to amputate his large toe on his left toe and 2nd bone.   States this occurred in July 2021 -     Current meds:   Metformin 100 bid.   Had prescribed him jardiance - but he never took  - states the cost had been too much.   He was on MDI - and switched him to pump therapy -   (formerly on lantus 60 every HS and novolin R 25 units tid ac meals).     He began on medtronic pump shortly after I saw him - but then never followed up.   He is on medtronic 770 pump - his insurance has high cost for humalog or novolog.   He is on novolin R in his insulin pump. Found cheaper at [facility] by the vial.   He often does not bolus with meals - only 1 - 2 times per week -   He eats however 5 times per day - 3 meals and 2 snacks -   Has never carb counted before - but admits he usually just "estimates" how much insulin he needs.   (usually just 6 - 7 units with meals).     medtronic pump downloaded and reviewed today -   Insulin rate is at 1.5 units/hour -   Carb ratio " is at 1: 6 - but he does not input carbs.   ISF 25 -   bg target 110 - 120   Manual mode is 100%   Average bg is 308 -   Total daily dose is 40 units   85% is coming from basal rate.   15% is coming from bolusing.   Active insulin time is set for 4 hours.     Checking sugars with a glucometer - Relion meter - doesn't have his meter with him.   Has never used cgm  Therapy.   I had ordered guardian for him to go with his pump - but never was covered or cost was too much for him.   Last checked glucose yesterday.   Had small piece of lasagna for lunch - states gave 6 units of insulin   No acute changes in physical complaints.   Has seen eye doctor - states no acute changes in vision - still wears glasses -       Last visit notes from 12/2020:   HPI: Billy Yatesn is a 57 y.o.  male c/I for visit to address Diabetes Type 2  This is the first time I am seeing him for care, follows with Dr. BRAD Baker MD for primary care needs.   Recently has switched to seeing dr. Foreman for primary care needs.   Has not seen endocrinology or diabetes specialist in the past.     He had recent covid 19 infection, but this has since resolved.   He was diagnosed with T2DM about 20 years ago  Family history - both sides strong history.   Has never been hospitalized r/t DM.  Denies missing doses of DM medication.     Current a1c @ 10.5%. Historically uncontrolled in past.   Current meds -   Metformin 1000 bid.   Began insulin shortly after his diagnosis.   lantus 55 units every night.   novolin R ac meals - 25 units tid.  (previously on novolog but insurance stopped covering).   Tried trulicity in his past, but caused severe constipation. Does not really want to try drug class again for that reason.   Has never tried SGLT2i's.     He is feeling well, does not feel when his sugars are high or low.  Does not titrate his insulin based on glucose readings. Generally always gives the full 25 units as his sugars just remain high  regardless  Of what he eats or what he does.   He admits not always following a diabetic diet, finds it hard to eliminate carbs or limit them.     Complications from diabetes -   +Retinopathy.   + CV disease   + history of MI, Nstemi in May 2013. Got stent placed.   -nephropathy.   Large foot ulcer on left foot - receiving IV antibiotics via right upper arm picc line.   In boot with wound vac. Following with podiatry.        Past medical History:   Past Medical History:   Diagnosis Date    Allergy     CAD (coronary artery disease), native coronary artery 6/25/2013    Cancer     COVID-19 virus detected 9/1/2020    Diabetes mellitus     Diabetes mellitus, type 2     Disorder of kidney and ureter     Heart attack 04/2012    Hx of colon cancer, stage I     Hyperlipidemia     Hypertension     Muscular pain     post-op after colonoscopy    NSTEMI May 2013 - peak troponin 0.22 5/22/2013    MICHAELA (obstructive sleep apnea)     Retinopathy due to secondary diabetes       Family hx:   Family History   Problem Relation Age of Onset    Heart disease Mother     Diabetes Mother     Diabetes Father     Heart disease Brother     Diabetes Maternal Grandmother     Diabetes Maternal Grandfather     Diabetes Paternal Grandmother     Diabetes Paternal Grandfather     Anesthesia problems Neg Hx       Current meds:   Current Outpatient Medications:     acetaminophen (TYLENOL) 500 MG tablet, Take 1,000 mg by mouth daily as needed for Pain., Disp: , Rfl:     ascorbic acid, vitamin C, (VITAMIN C) 250 MG tablet, Take 500 mg by mouth once daily., Disp: , Rfl:     aspirin (ECOTRIN) 81 MG EC tablet, Take 1 tablet (81 mg total) by mouth once daily., Disp: , Rfl: 0    atorvastatin (LIPITOR) 80 MG tablet, Take 1 tablet (80 mg total) by mouth once daily. Take every night., Disp: 30 tablet, Rfl: 11    bismuth subsalicylate (PEPTO BISMOL) 262 mg/15 mL suspension, Take 15 mLs by mouth daily as needed (diarrhea)., Disp: , Rfl:     blood sugar diagnostic  "(ACCU-CHEK GUIDE TEST STRIPS) Strp, 1 each by Misc.(Non-Drug; Combo Route) route 5 (five) times daily., Disp: 150 each, Rfl: 11    carvediloL (COREG) 12.5 MG tablet, Take 0.5 tablets (6.25 mg total) by mouth 2 (two) times daily with meals., Disp: 90 tablet, Rfl: 3    insulin (LANTUS SOLOSTAR U-100 INSULIN) glargine 100 units/mL SubQ pen, Inject 50 Units into the skin once daily. USE AS BACK UP INSULIN ONLY - EMERGENCY USE IF YOUR ARE OFF OF YOUR INSULIN PUMP, Disp: 15 mL, Rfl: 1    insulin lispro (HUMALOG U-100 INSULIN) 100 unit/mL injection, Inject 15 Units into the skin 3 (three) times daily before meals. Gives via insulin pump only. Do not Directly inject., Disp: , Rfl:     lancets (ACCU-CHEK FASTCLIX LANCET DRUM) Misc, 1 each by Misc.(Non-Drug; Combo Route) route Daily., Disp: 30 each, Rfl: 11    lisinopriL (PRINIVIL,ZESTRIL) 40 MG tablet, Take 1 tablet (40 mg total) by mouth once daily., Disp: 30 tablet, Rfl: 11    metFORMIN (GLUCOPHAGE) 1000 MG tablet, Take 1 tablet (1,000 mg total) by mouth 2 (two) times daily with meals., Disp: 180 tablet, Rfl: 3    multivit-min/folic/vit K/lycop (MEN'S MULTIVITAMIN ORAL), Take 1 tablet by mouth once daily., Disp: , Rfl:     pen needle, diabetic 31 gauge x 3/16" Ndle, Inject 1 each into the skin 4 (four) times daily., Disp: , Rfl:     blood-glucose meter,continuous (DEXCOM ) Misc, 1 Device by Misc.(Non-Drug; Combo Route) route continuous., Disp: 1 each, Rfl: 0    blood-glucose sensor (DEXCOM G7 SENSOR) Haley, 1 each by Misc.(Non-Drug; Combo Route) route every 10 days., Disp: 3 each, Rfl: 11    dapagliflozin (FARXIGA) 5 mg Tab tablet, Take 1 tablet (5 mg total) by mouth once daily., Disp: 30 tablet, Rfl: 3    insulin aspart, niacinamide, (FIASP U-100 INSULIN) 100 unit/mL Soln, Inject 100 Units into the skin continuous. Gives via insulin pump up to 100 units daily. Do not directly inject. Uses as directed. Rate is 1.75 units/hour - carb counts as well for meal time " boluses., Disp: 60 mL, Rfl: 6    MINIMED 770G INSULIN PUMP Misc, 1 each by Misc.(Non-Drug; Combo Route) route continuous. Medically necessary for management of Type 2 diabetes, E11.65, Disp: 1 each, Rfl: 0    semaglutide (OZEMPIC) 0.25 mg or 0.5 mg(2 mg/1.5 mL) pen injector, Inject 0.5 mg into the skin every 7 days. Take 0.25mg SC every week x the first 2 weeks only. Then increase to 0.5mg SC every week thereafter and stay at this dose., Disp: 1 each, Rfl: 6     Current Diabetes medications:   novolin r via insulin pump (purchases from [employer]) --  Now has changed to humalog and got $35/month with voucher.   Uses in [employer] 770 pump   Metformin 1000 mg po bid with food  Trulicity 1.5mg sc every week - if off right now - b/c he can't afford  Farxiga 5 - is off - b/c he can't afford.   Of note - he has lantus pen - 50 untis once per day for back up insulin     Medications Tried and Failed:   trulicity - severe constipation   novolog - insurance wouldn't cover so switched to novolin r.  Long acting insulin: lantus 55 units every night.   Short acting insulin:  novolin r 25 units tid ac meals - takes 5 - 15 minutes prior to meals.   jardiance - was prescribed, but he never took as the cost was too much.   farxiga - was prescribed     Review of Pertinent co-morbidities/risk factors:   CV: + history of MI   CAD: yes nstemi with stent.   Now history of left foot toe amputation.   Takes aspirin 81mg tablet daily  BP: has history of HTN  Statin: Taking  ACE/ARB: Taking    Social History     Tobacco Use   Smoking Status Never    Passive exposure: Never   Smokeless Tobacco Never      Social:   Lives at home with: wife.   Life changes/stressors currently: has ulcer to left foot - so off work for now while this heals.   Diet:  Not always following ADA diet   Meals: 3 per day and snacks.        Breakfast - cornflakes with milk, eggs, oatmeal       Lunch - precooked meals from [employer]. (meat/veggie/small carb).        Dinner -  "hot plate meals from restaurants - meat/veggie/starch. Chicken and vegetables, beef, occasionally rice.   Spaghetti and meatballs.        Snacks - doritos bags, cheetos, kind bar reduced sugar.        Drinks - diet tea, milk, diet lemonade, diet coke, water.   Exercise: nothing formal, but normally walking a lot in his work.   Activities: was working full time as  at [employer]. No longer working.     Glucose Monitoring:   Checking sugars 4x/day by fingerstick.  Has relion meter.    CGM - has never used, Free style florentino and dexcom are both excluded from his insurance plan in the past -   Had prescribed guardian cgm - but never was covered.     Standards of care:   Eyes: .: 08/15/2019  Foot exam: : 05/11/2023   Diabetes education: yes - on start of insulin pump therapy.     Vital Signs  BP (!) 130/90 (BP Location: Right arm, Patient Position: Sitting, BP Method: Large (Manual))   Pulse 62   Temp 97.9 °F (36.6 °C) (Temporal)   Resp 16   Ht 6' 2" (1.88 m)   Wt 132.8 kg (292 lb 12.3 oz)   SpO2 97%   BMI 37.59 kg/m²     Pertinent Labs:   Hgba1c   Lab Results   Component Value Date    HGBA1C 9.4 (H) 04/06/2023    HGBA1C 7.1 (H) 11/10/2022    HGBA1C 8.0 (H) 08/09/2022     Lipid panel   Lab Results   Component Value Date    CHOL 151 04/06/2023    CHOL 121 11/10/2022    CHOL 175 01/26/2022     Lab Results   Component Value Date    HDL 35 (L) 04/06/2023    HDL 34 (L) 11/10/2022    HDL 43 01/26/2022     Lab Results   Component Value Date    LDLCALC 89.2 04/06/2023    LDLCALC 57.4 (L) 11/10/2022    LDLCALC 93.2 01/26/2022     Lab Results   Component Value Date    TRIG 134 04/06/2023    TRIG 148 11/10/2022    TRIG 194 (H) 01/26/2022     Lab Results   Component Value Date    CHOLHDL 23.2 04/06/2023    CHOLHDL 28.1 11/10/2022    CHOLHDL 24.6 01/26/2022      CMP  Glucose   Date Value Ref Range Status   04/06/2023 111 (H) 70 - 110 mg/dL Final     BUN   Date Value Ref Range Status   04/06/2023 14 6 - 20 mg/dL " Final     Creatinine   Date Value Ref Range Status   04/06/2023 1.0 0.5 - 1.4 mg/dL Final     eGFR if    Date Value Ref Range Status   06/10/2022 >60.0 >60 mL/min/1.73 m^2 Final     eGFR if non    Date Value Ref Range Status   06/10/2022 >60.0 >60 mL/min/1.73 m^2 Final     Comment:     Calculation used to obtain the estimated glomerular filtration  rate (eGFR) is the CKD-EPI equation.        AST   Date Value Ref Range Status   04/06/2023 32 10 - 40 U/L Final     ALT   Date Value Ref Range Status   04/06/2023 44 10 - 44 U/L Final     Microalbumin creatinine ratio:   Lab Results   Component Value Date    MICALBCREAT 283.7 (H) 04/06/2023       Review Of Systems:   Gen: Appetite good, no weight loss or gain,  No fatigue. Denies polydipsia.  Skin: Skin is intact and heals well, denies any rashes or hair changes.   EENT: Denies any acute visual disturbances, nor blurred vision.   Resp: Denies SOB or Dyspnea on exertion, denies cough.   Cardiac: Denies chest pain, palpitations, or swelling.   GI: Denies abdominal pain, nausea or vomiting, diarrhea, or constipation.   /GYN: + nocturia several times per night, however denies burning, frequency or pain on urination.  MS/Neuro: Denies numbness/ tingling in BLE; unsteady Gait, uses boot.   speech clear  Psych: Denies drug/ETOH abuse, no hx of depression.  Other systems: negative.    Physical Exam:   GENERAL: Well developed, well nourished in appearance.   PSYCH: AAOx3, appropriate mood and affect, pleasant expression, conversant, appears relaxed, well groomed.   EYES: PERRL, Conjunctiva and corneas clear  NECK: Soft and Supple, trachea midline  CHEST: Even, regular, and unlabored respirations  ABDOMEN: Soft, non-tender, non-distended. Normal bowel sounds.   VASCULAR: pedal pulses palpable bilaterally, no edema.  NEURO:  cranial nerves II - XII intact   MUSCULOSKELETAL: Good ROM,  unsteady gait. Due to boot on right foot.   SKIN: Skin warm, dry,  and intact   FEET: right foot in boot - amputation on left foot/large toe.     Assessment and Plan of Care:     Billy was seen today for follow-up.    Diagnoses and all orders for this visit:    Type 2 diabetes mellitus with diabetic peripheral angiopathy without gangrene, with long-term current use of insulin  -     blood-glucose sensor (DEXCOM G7 SENSOR) Haley; 1 each by Misc.(Non-Drug; Combo Route) route every 10 days.  -     insulin aspart, niacinamide, (FIASP U-100 INSULIN) 100 unit/mL Soln; Inject 100 Units into the skin continuous. Gives via insulin pump up to 100 units daily. Do not directly inject. Uses as directed. Rate is 1.75 units/hour - carb counts as well for meal time boluses.  -     semaglutide (OZEMPIC) 0.25 mg or 0.5 mg(2 mg/1.5 mL) pen injector; Inject 0.5 mg into the skin every 7 days. Take 0.25mg SC every week x the first 2 weeks only. Then increase to 0.5mg SC every week thereafter and stay at this dose.  -     blood-glucose meter,continuous (DEXCOM ) Misc; 1 Device by Misc.(Non-Drug; Combo Route) route continuous.  -     Hemoglobin A1C; Future  -     Comprehensive Metabolic Panel; Future  -     POCT Glucose, Hand-Held Device    Type 2 diabetes mellitus 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, with long-term current use of insulin  -     dapagliflozin (FARXIGA) 5 mg Tab tablet; Take 1 tablet (5 mg total) by mouth once daily.    Hypertension associated with diabetes    Diabetic ulcer of heel associated with type 2 diabetes mellitus, with necrosis of muscle, unspecified laterality    Diabetes mellitus with microalbuminuria    Insulin pump in place    Amputation of toe of left foot    Coronary artery disease involving coronary bypass graft of native heart without angina pectoris    Proteinuria, unspecified type           1. T2DM with hyperglycemia- Hgba1c goal is 7.5% or less without hypoglycemia - 11.5%--> 10.8%--> 10.5%--> 12.2% -->10%   -->8%--> 7.1% -- going back up - now at 9.4% (off of glp1's).   discussed DM, progression of disease, long term complications, CV risk factors and tx options.   Has already had MI in past.   Continue/restart glp1 - used to do trulicity - 1.5mg every week. Sent in for ozempc 0.5mg every week - and asking for patient assistance. He did not qualifiy. He has new insurance so jsut resent.   Continue metformin 1000 bid.   reStart farxiga 5mg tablet daily - he has right toe pinky amputation, and left large toe amputation - will use caution -   Will decrease insulin dose preventatively as we are starting sglt2i.   Sent in for patient assitance. He does not qualify - with new insurance.   He is going to notify me if he has trouble getting his meds.   Continue on medtronic 770 insulin pump -presets from 10 units with meals to 15 units with meals. No changes.   We have tried for cgm but cost has been high.   Encouraged to bolus for meals -   Entered in presets for him at last visit -   5 units for a snack.   10 or 15 units for medium or large meal.   Advised on over bolusing/correcting with just 5 units.   Advised to please avoid fast food, chips, processed foods.   Today - I did split basal rates - 1.6 untis hour/during day time. - no changes today.   Icnreased night time to 1.75 units/hour - no changes today.   (12 am until 7am)   Reviewed back up plan - lantus 50 units daily- he has the pen  Patient likes to talk/text/use his smart phone so looking forward to insulin pump and getting sugars under control.   No history of pancreatitis or medullary thyroid cancer.   May plan to increase dose of trulicity if tolerated well.   Notify me if side effects such as severe abdominal pain, nausea, diarrhea.   Advise compliance with ADA diet and encourage exercise  Reviewed  hypoglycemia, s/s and appropriate tx. Have/get quick acting glucose tablets at hand.   Instructed to monitor Blood glucose 2 - 4x/day and bring meter/ log to every  clinic visit.       2. HTN - controlled, continue meds as previously prescribed and monitor.   Coreg 12.5 po bid.   Lisinopril 40.   Urine mac + 202 ---> 415 - start sglt2i.     3. HLD - LDL goal < 100. He is at goal.   Currently on statin therapy- lipitor 80 mg every HS.   History of MI/nstemi.   On aspirin daily.     4. Weight - BMI Body mass index is 37.59 kg/m².   Encourage Ada diet and exercise.   Restart glp1 - sending in for ozempic as they offer patient assistance.   He would be an excellent Mounjaro candidate.     5. Renal Function - stable. No history of nephropathy.   Has clinical urine mac +   Starting low dose farxiga.     6. Infections/s/p amputationss to toes ---> - now resulted in amputation - two toes  advised to gain compliance and not miss meal time boluses.   The high sugars are responsible culprit for the infection.   Following with podiatry.   I am starting farxiga but using with caution b/c of the history of amputations.       He has new insurance!!! [employer] - will see if issues with high deductible again?   Restart glp1 - will try for ozempic. 0.5mg every week.   Restart farxiga - \5mg tablet daily   Send in rx for dexcom g7 and  if he can afford, if insurance will approve.   F/u in 3 months           Addendum - was able to get him a voucher to start on the ozempic once per week -   He is not going to start on the dexcom b/c it's going to $375 - and he cannot afford.   Will send in florentino 3 to see if cheaper... AMY De Anda

## 2023-05-25 ENCOUNTER — OFFICE VISIT (OUTPATIENT)
Dept: PODIATRY | Facility: CLINIC | Age: 58
End: 2023-05-25
Payer: COMMERCIAL

## 2023-05-25 ENCOUNTER — TELEPHONE (OUTPATIENT)
Dept: PHARMACY | Facility: CLINIC | Age: 58
End: 2023-05-25
Payer: COMMERCIAL

## 2023-05-25 VITALS
WEIGHT: 291 LBS | SYSTOLIC BLOOD PRESSURE: 154 MMHG | DIASTOLIC BLOOD PRESSURE: 78 MMHG | HEIGHT: 74 IN | BODY MASS INDEX: 37.35 KG/M2 | HEART RATE: 62 BPM

## 2023-05-25 DIAGNOSIS — L97.512 FOOT ULCER, RIGHT, WITH FAT LAYER EXPOSED: ICD-10-CM

## 2023-05-25 DIAGNOSIS — E11.49 TYPE II DIABETES MELLITUS WITH NEUROLOGICAL MANIFESTATIONS: Primary | ICD-10-CM

## 2023-05-25 PROCEDURE — 3008F BODY MASS INDEX DOCD: CPT | Mod: CPTII,S$GLB,, | Performed by: PODIATRIST

## 2023-05-25 PROCEDURE — 99999 PR PBB SHADOW E&M-EST. PATIENT-LVL IV: ICD-10-PCS | Mod: PBBFAC,,, | Performed by: PODIATRIST

## 2023-05-25 PROCEDURE — 99213 PR OFFICE/OUTPT VISIT, EST, LEVL III, 20-29 MIN: ICD-10-PCS | Mod: 25,S$GLB,, | Performed by: PODIATRIST

## 2023-05-25 PROCEDURE — 3060F PR POS MICROALBUMINURIA RESULT DOCUMENTED/REVIEW: ICD-10-PCS | Mod: CPTII,S$GLB,, | Performed by: PODIATRIST

## 2023-05-25 PROCEDURE — 3077F PR MOST RECENT SYSTOLIC BLOOD PRESSURE >= 140 MM HG: ICD-10-PCS | Mod: CPTII,S$GLB,, | Performed by: PODIATRIST

## 2023-05-25 PROCEDURE — 3077F SYST BP >= 140 MM HG: CPT | Mod: CPTII,S$GLB,, | Performed by: PODIATRIST

## 2023-05-25 PROCEDURE — 3066F PR DOCUMENTATION OF TREATMENT FOR NEPHROPATHY: ICD-10-PCS | Mod: CPTII,S$GLB,, | Performed by: PODIATRIST

## 2023-05-25 PROCEDURE — 99213 OFFICE O/P EST LOW 20 MIN: CPT | Mod: 25,S$GLB,, | Performed by: PODIATRIST

## 2023-05-25 PROCEDURE — 4010F ACE/ARB THERAPY RXD/TAKEN: CPT | Mod: CPTII,S$GLB,, | Performed by: PODIATRIST

## 2023-05-25 PROCEDURE — 3060F POS MICROALBUMINURIA REV: CPT | Mod: CPTII,S$GLB,, | Performed by: PODIATRIST

## 2023-05-25 PROCEDURE — 99999 PR PBB SHADOW E&M-EST. PATIENT-LVL IV: CPT | Mod: PBBFAC,,, | Performed by: PODIATRIST

## 2023-05-25 PROCEDURE — 4010F PR ACE/ARB THEARPY RXD/TAKEN: ICD-10-PCS | Mod: CPTII,S$GLB,, | Performed by: PODIATRIST

## 2023-05-25 PROCEDURE — 11042 DBRDMT SUBQ TIS 1ST 20SQCM/<: CPT | Mod: RT,S$GLB,, | Performed by: PODIATRIST

## 2023-05-25 PROCEDURE — 3078F PR MOST RECENT DIASTOLIC BLOOD PRESSURE < 80 MM HG: ICD-10-PCS | Mod: CPTII,S$GLB,, | Performed by: PODIATRIST

## 2023-05-25 PROCEDURE — 1159F MED LIST DOCD IN RCRD: CPT | Mod: CPTII,S$GLB,, | Performed by: PODIATRIST

## 2023-05-25 PROCEDURE — 1159F PR MEDICATION LIST DOCUMENTED IN MEDICAL RECORD: ICD-10-PCS | Mod: CPTII,S$GLB,, | Performed by: PODIATRIST

## 2023-05-25 PROCEDURE — 3078F DIAST BP <80 MM HG: CPT | Mod: CPTII,S$GLB,, | Performed by: PODIATRIST

## 2023-05-25 PROCEDURE — 3046F HEMOGLOBIN A1C LEVEL >9.0%: CPT | Mod: CPTII,S$GLB,, | Performed by: PODIATRIST

## 2023-05-25 PROCEDURE — 3008F PR BODY MASS INDEX (BMI) DOCUMENTED: ICD-10-PCS | Mod: CPTII,S$GLB,, | Performed by: PODIATRIST

## 2023-05-25 PROCEDURE — 3066F NEPHROPATHY DOC TX: CPT | Mod: CPTII,S$GLB,, | Performed by: PODIATRIST

## 2023-05-25 PROCEDURE — 3046F PR MOST RECENT HEMOGLOBIN A1C LEVEL > 9.0%: ICD-10-PCS | Mod: CPTII,S$GLB,, | Performed by: PODIATRIST

## 2023-05-25 PROCEDURE — 11042 PR DEBRIDEMENT, SKIN, SUB-Q TISSUE,=<20 SQ CM: ICD-10-PCS | Mod: RT,S$GLB,, | Performed by: PODIATRIST

## 2023-05-28 NOTE — PROGRESS NOTES
Subjective:      Patient ID: Billy Rivera is a 57 y.o. male.    Chief Complaint: Wound Check (Right foot /Mild drainage )      Billy is a 57 y.o. male who presents to the clinic for evaluation and treatment of high risk feet. Billy has a past medical history of Allergy, CAD (coronary artery disease), native coronary artery (6/25/2013), Cancer, COVID-19 virus detected (9/1/2020), Diabetes mellitus, Diabetes mellitus, type 2, Disorder of kidney and ureter, Heart attack (04/2012), colon cancer, stage I, Hyperlipidemia, Hypertension, Muscular pain, NSTEMI May 2013 - peak troponin 0.22 (5/22/2013), MICHAELA (obstructive sleep apnea), and Retinopathy due to secondary diabetes.  He has been regular shoe gear with a new issue of breakdown of previous ulceration right foot below the 2nd toe joint as well as been recently measured for diabetic shoe gear.  Patient presents for follow-up wound care.  No new complaints today.  PCP: BRAD Baker MD    Date Last Seen by PCP: 3/23/2022    Current shoe gear:  Affected Foot: DARCO Shoe right foot     Affected Foot: Tennis shoe left foot    History of Trauma: negative  Sign of Infection: none    Hemoglobin A1C   Date Value Ref Range Status   04/06/2023 9.4 (H) 4.0 - 5.6 % Final     Comment:     ADA Screening Guidelines:  5.7-6.4%  Consistent with prediabetes  >or=6.5%  Consistent with diabetes    High levels of fetal hemoglobin interfere with the HbA1C  assay. Heterozygous hemoglobin variants (HbS, HgC, etc)do  not significantly interfere with this assay.   However, presence of multiple variants may affect accuracy.     11/10/2022 7.1 (H) 4.0 - 5.6 % Final     Comment:     ADA Screening Guidelines:  5.7-6.4%  Consistent with prediabetes  >or=6.5%  Consistent with diabetes    High levels of fetal hemoglobin interfere with the HbA1C  assay. Heterozygous hemoglobin variants (HbS, HgC, etc)do  not significantly interfere with this assay.   However, presence of multiple  variants may affect accuracy.     08/09/2022 8.0 (H) 4.0 - 5.6 % Final     Comment:     ADA Screening Guidelines:  5.7-6.4%  Consistent with prediabetes  >or=6.5%  Consistent with diabetes    High levels of fetal hemoglobin interfere with the HbA1C  assay. Heterozygous hemoglobin variants (HbS, HgC, etc)do  not significantly interfere with this assay.   However, presence of multiple variants may affect accuracy.         Review of Systems   Constitutional: Negative for chills, decreased appetite, fever, malaise/fatigue and night sweats.   HENT:  Negative for congestion, hearing loss, nosebleeds and tinnitus.    Eyes:  Negative for double vision, pain, photophobia and visual disturbance.   Cardiovascular:  Negative for chest pain, claudication, cyanosis and leg swelling.   Respiratory:  Negative for cough, hemoptysis, shortness of breath and wheezing.    Endocrine: Negative for cold intolerance and heat intolerance.   Hematologic/Lymphatic: Negative for adenopathy and bleeding problem.   Skin:  Positive for color change, dry skin, nail changes, poor wound healing and suspicious lesions. Negative for itching and rash.   Musculoskeletal:  Negative for arthritis, falls, gout, joint pain, myalgias and stiffness.   Gastrointestinal:  Negative for abdominal pain, jaundice, nausea and vomiting.   Genitourinary:  Negative for dysuria, frequency and hematuria.   Neurological:  Positive for numbness and sensory change. Negative for difficulty with concentration, loss of balance and paresthesias.   Psychiatric/Behavioral:  Negative for altered mental status, hallucinations and suicidal ideas. The patient is not nervous/anxious.    Allergic/Immunologic: Negative for environmental allergies and persistent infections.         Objective:        Physical Exam  Constitutional:       General: He is not in acute distress.     Appearance: Normal appearance. He is well-developed.   HENT:      Head: Normocephalic and atraumatic.    Cardiovascular:      Pulses:           Dorsalis pedis pulses are 2+ on the right side and 2+ on the left side.        Posterior tibial pulses are 2+ on the right side and 2+ on the left side.      Comments: CFT< 3 secs all toes bilateral foot, skin temp warm to cool bilateral foot, no hair growth bilateral lower extremity, no edema to bilateral lower extremities    Pulmonary:      Effort: Pulmonary effort is normal.   Musculoskeletal:         General: No swelling.      Right foot: Normal range of motion. No deformity.      Left foot: Normal range of motion. No deformity.      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     5/5 muscle strength with DF/PF/Inv/Ev bilateral.    Inspection and palpation of the joints and bones reveal no crepitus or joint effusion. No tenderness upon palpation.  Angle and base of gait are normal.    Feet:      Right foot:      Skin integrity: No skin breakdown or erythema.      Left foot:      Skin integrity: No skin breakdown or erythema.   Skin:     General: Skin is warm and dry.      Capillary Refill: Capillary refill takes 2 to 3 seconds.      Findings: No erythema.      Nails: There is no clubbing.      Comments: Ulceration location:  Ulcerations bilateral 2nd MPJ right foot noted to have an ulceration to the muscle tissue with no sign of infection.  This ulceration measures 0.2 cm in diameter by 0.1 cm in depth, post debridement.   Neurological:      Mental Status: He is alert and oriented to person, place, and time.      Sensory: Sensory deficit present.      Motor: No weakness.      Deep Tendon Reflexes:      Reflex Scores:       Patellar reflexes are 2+ on the right side and 2+ on the left side.       Achilles reflexes are 2+ on the right side and 2+ on the left side.     Comments: Diminished/loss of protective sensation all toes bilateral to 10 gram monofilament.   Psychiatric:         Mood and Affect: Mood normal.         Behavior:  "Behavior normal.         Thought Content: Thought content normal.     Wound size unchanged compared to last visit.        Assessment:       Encounter Diagnoses   Name Primary?    Type II diabetes mellitus with neurological manifestations Yes    Foot ulcer, right, with fat layer exposed     Right foot pain                Plan:       Billy was seen today for wound check.    Diagnoses and all orders for this visit:    Type II diabetes mellitus with neurological manifestations    Foot ulcer, right, with fat layer exposed    Right foot pain            Wound Debridement/post debridement    Performed by: Mingo Melara DPM   Authorized by: Patient    Time out: Immediately prior to procedure a "time out" was called to verify the correct patient, procedure, equipment, support staff and site/side marked as required.   Consent Done?:  Yes (Verbal)  Local anesthesia used?: No       Wound Details:    Location:  right foot     Type of Debridement:  Excisional       Length (cm):  0.2       Width (cm):  0.2       Depth (cm):  0.1       Percent Debrided (%):  100             Depth of debridement:  Subcutaneous tissue/muscle tissue    Tissue debrided:  Dermis, Epidermis and Subcutaneous    Devitalized tissue debrided:  Biofilm, Callus and Necrotic/Eschar    Instruments:  Blade, Curette and Nippers     Bleeding:  Minimal  Hemostasis Achieved: Yes    Method Used:  Pressure  Patient tolerance:  Patient tolerated the procedure well with no immediate complications    Wound care discussed in detail, football dressing applied today with Iodosorb with football dressing.  Follow-up in 1 week for re-evaluation.  Darco shoe dispensed.          "

## 2023-05-30 NOTE — PROGRESS NOTES
Subjective:      Patient ID: Billy Rivera is a 57 y.o. male.    Chief Complaint: Wound Check (Right foot/Mild drainage )      Billy is a 57 y.o. male who presents to the clinic for evaluation and treatment of high risk feet. Billy has a past medical history of Allergy, CAD (coronary artery disease), native coronary artery (6/25/2013), Cancer, COVID-19 virus detected (9/1/2020), Diabetes mellitus, Diabetes mellitus, type 2, Disorder of kidney and ureter, Heart attack (04/2012), colon cancer, stage I, Hyperlipidemia, Hypertension, Muscular pain, NSTEMI May 2013 - peak troponin 0.22 (5/22/2013), MICHALEA (obstructive sleep apnea), and Retinopathy due to secondary diabetes.  \  Patient presents for follow-up wound care.   PCP: BRAD Baker MD    Date Last Seen by PCP: 3/23/2022    Current shoe gear:  Affected Foot: DARCO Shoe right foot     Affected Foot: Tennis shoe left foot    History of Trauma: negative  Sign of Infection: none    Hemoglobin A1C   Date Value Ref Range Status   04/06/2023 9.4 (H) 4.0 - 5.6 % Final     Comment:     ADA Screening Guidelines:  5.7-6.4%  Consistent with prediabetes  >or=6.5%  Consistent with diabetes    High levels of fetal hemoglobin interfere with the HbA1C  assay. Heterozygous hemoglobin variants (HbS, HgC, etc)do  not significantly interfere with this assay.   However, presence of multiple variants may affect accuracy.     11/10/2022 7.1 (H) 4.0 - 5.6 % Final     Comment:     ADA Screening Guidelines:  5.7-6.4%  Consistent with prediabetes  >or=6.5%  Consistent with diabetes    High levels of fetal hemoglobin interfere with the HbA1C  assay. Heterozygous hemoglobin variants (HbS, HgC, etc)do  not significantly interfere with this assay.   However, presence of multiple variants may affect accuracy.     08/09/2022 8.0 (H) 4.0 - 5.6 % Final     Comment:     ADA Screening Guidelines:  5.7-6.4%  Consistent with prediabetes  >or=6.5%  Consistent with diabetes    High levels  of fetal hemoglobin interfere with the HbA1C  assay. Heterozygous hemoglobin variants (HbS, HgC, etc)do  not significantly interfere with this assay.   However, presence of multiple variants may affect accuracy.         Review of Systems   Constitutional: Negative for chills, decreased appetite, fever, malaise/fatigue and night sweats.   HENT:  Negative for congestion, hearing loss, nosebleeds and tinnitus.    Eyes:  Negative for double vision, pain, photophobia and visual disturbance.   Cardiovascular:  Negative for chest pain, claudication, cyanosis and leg swelling.   Respiratory:  Negative for cough, hemoptysis, shortness of breath and wheezing.    Endocrine: Negative for cold intolerance and heat intolerance.   Hematologic/Lymphatic: Negative for adenopathy and bleeding problem.   Skin:  Positive for color change, dry skin, nail changes, poor wound healing and suspicious lesions. Negative for itching and rash.   Musculoskeletal:  Negative for arthritis, falls, gout, joint pain, myalgias and stiffness.   Gastrointestinal:  Negative for abdominal pain, jaundice, nausea and vomiting.   Genitourinary:  Negative for dysuria, frequency and hematuria.   Neurological:  Positive for numbness and sensory change. Negative for difficulty with concentration, loss of balance and paresthesias.   Psychiatric/Behavioral:  Negative for altered mental status, hallucinations and suicidal ideas. The patient is not nervous/anxious.    Allergic/Immunologic: Negative for environmental allergies and persistent infections.         Objective:        Physical Exam  Constitutional:       General: He is not in acute distress.     Appearance: Normal appearance. He is well-developed.   HENT:      Head: Normocephalic and atraumatic.   Cardiovascular:      Pulses:           Dorsalis pedis pulses are 2+ on the right side and 2+ on the left side.        Posterior tibial pulses are 2+ on the right side and 2+ on the left side.      Comments: CFT<  3 secs all toes bilateral foot, skin temp warm to cool bilateral foot, no hair growth bilateral lower extremity, no edema to bilateral lower extremities    Pulmonary:      Effort: Pulmonary effort is normal.   Musculoskeletal:         General: No swelling.      Right foot: Normal range of motion. No deformity.      Left foot: Normal range of motion. No deformity.      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     5/5 muscle strength with DF/PF/Inv/Ev bilateral.    Inspection and palpation of the joints and bones reveal no crepitus or joint effusion. No tenderness upon palpation.  Angle and base of gait are normal.    Feet:      Right foot:      Skin integrity: No skin breakdown or erythema.      Left foot:      Skin integrity: No skin breakdown or erythema.   Skin:     General: Skin is warm and dry.      Capillary Refill: Capillary refill takes 2 to 3 seconds.      Findings: No erythema.      Nails: There is no clubbing.      Comments: Ulceration location:  Ulcerations bilateral 2nd MPJ right foot noted to have an ulceration to the muscle tissue with no sign of infection.  This ulceration measures 0.1 cm in diameter by 0.1 cm in depth, post debridement.   Neurological:      Mental Status: He is alert and oriented to person, place, and time.      Sensory: Sensory deficit present.      Motor: No weakness.      Deep Tendon Reflexes:      Reflex Scores:       Patellar reflexes are 2+ on the right side and 2+ on the left side.       Achilles reflexes are 2+ on the right side and 2+ on the left side.     Comments: Diminished/loss of protective sensation all toes bilateral to 10 gram monofilament.   Psychiatric:         Mood and Affect: Mood normal.         Behavior: Behavior normal.         Thought Content: Thought content normal.     Wound size unchanged compared to last visit.        Assessment:       Encounter Diagnoses   Name Primary?    Type II diabetes mellitus with  "neurological manifestations Yes    Foot ulcer, right, with fat layer exposed                Plan:       Billy was seen today for wound check.    Diagnoses and all orders for this visit:    Type II diabetes mellitus with neurological manifestations    Foot ulcer, right, with fat layer exposed            Wound Debridement/post debridement    Performed by: Mingo Melara DPM   Authorized by: Patient    Time out: Immediately prior to procedure a "time out" was called to verify the correct patient, procedure, equipment, support staff and site/side marked as required.   Consent Done?:  Yes (Verbal)  Local anesthesia used?: No       Wound Details:    Location:  right foot     Type of Debridement:  Excisional       Length (cm):  0.1       Width (cm):  0.1       Depth (cm):  0.1       Percent Debrided (%):  100             Depth of debridement:  Subcutaneous tissue/muscle tissue    Tissue debrided:  Dermis, Epidermis and Subcutaneous    Devitalized tissue debrided:  Biofilm, Callus and Necrotic/Eschar    Instruments:  Blade, Curette and Nippers     Bleeding:  Minimal  Hemostasis Achieved: Yes    Method Used:  Pressure  Patient tolerance:  Patient tolerated the procedure well with no immediate complications    Wound care discussed in detail, football dressing applied today with Iodosorb with football dressing.  Follow-up in 1 week for re-evaluation.      The nature of the condition, options for management, as well as potential risks and complications were discussed in detail with patient. Patient was amenable to my recommendations and left my office fully informed and will follow up as instructed or sooner if necessary.                "

## 2023-06-01 ENCOUNTER — OFFICE VISIT (OUTPATIENT)
Dept: PODIATRY | Facility: CLINIC | Age: 58
End: 2023-06-01
Payer: COMMERCIAL

## 2023-06-01 VITALS
HEART RATE: 62 BPM | WEIGHT: 291 LBS | SYSTOLIC BLOOD PRESSURE: 154 MMHG | HEIGHT: 74 IN | BODY MASS INDEX: 37.35 KG/M2 | DIASTOLIC BLOOD PRESSURE: 78 MMHG

## 2023-06-01 DIAGNOSIS — L97.512 FOOT ULCER, RIGHT, WITH FAT LAYER EXPOSED: ICD-10-CM

## 2023-06-01 DIAGNOSIS — E11.49 TYPE II DIABETES MELLITUS WITH NEUROLOGICAL MANIFESTATIONS: Primary | ICD-10-CM

## 2023-06-01 PROCEDURE — 3060F PR POS MICROALBUMINURIA RESULT DOCUMENTED/REVIEW: ICD-10-PCS | Mod: CPTII,S$GLB,, | Performed by: PODIATRIST

## 2023-06-01 PROCEDURE — 11042 PR DEBRIDEMENT, SKIN, SUB-Q TISSUE,=<20 SQ CM: ICD-10-PCS | Mod: RT,S$GLB,, | Performed by: PODIATRIST

## 2023-06-01 PROCEDURE — 3008F BODY MASS INDEX DOCD: CPT | Mod: CPTII,S$GLB,, | Performed by: PODIATRIST

## 2023-06-01 PROCEDURE — 3077F PR MOST RECENT SYSTOLIC BLOOD PRESSURE >= 140 MM HG: ICD-10-PCS | Mod: CPTII,S$GLB,, | Performed by: PODIATRIST

## 2023-06-01 PROCEDURE — 4010F ACE/ARB THERAPY RXD/TAKEN: CPT | Mod: CPTII,S$GLB,, | Performed by: PODIATRIST

## 2023-06-01 PROCEDURE — 3066F NEPHROPATHY DOC TX: CPT | Mod: CPTII,S$GLB,, | Performed by: PODIATRIST

## 2023-06-01 PROCEDURE — 3008F PR BODY MASS INDEX (BMI) DOCUMENTED: ICD-10-PCS | Mod: CPTII,S$GLB,, | Performed by: PODIATRIST

## 2023-06-01 PROCEDURE — 3077F SYST BP >= 140 MM HG: CPT | Mod: CPTII,S$GLB,, | Performed by: PODIATRIST

## 2023-06-01 PROCEDURE — 4010F PR ACE/ARB THEARPY RXD/TAKEN: ICD-10-PCS | Mod: CPTII,S$GLB,, | Performed by: PODIATRIST

## 2023-06-01 PROCEDURE — 99213 OFFICE O/P EST LOW 20 MIN: CPT | Mod: 25,S$GLB,, | Performed by: PODIATRIST

## 2023-06-01 PROCEDURE — 99213 PR OFFICE/OUTPT VISIT, EST, LEVL III, 20-29 MIN: ICD-10-PCS | Mod: 25,S$GLB,, | Performed by: PODIATRIST

## 2023-06-01 PROCEDURE — 3046F HEMOGLOBIN A1C LEVEL >9.0%: CPT | Mod: CPTII,S$GLB,, | Performed by: PODIATRIST

## 2023-06-01 PROCEDURE — 99999 PR PBB SHADOW E&M-EST. PATIENT-LVL IV: ICD-10-PCS | Mod: PBBFAC,,, | Performed by: PODIATRIST

## 2023-06-01 PROCEDURE — 99999 PR PBB SHADOW E&M-EST. PATIENT-LVL IV: CPT | Mod: PBBFAC,,, | Performed by: PODIATRIST

## 2023-06-01 PROCEDURE — 3078F DIAST BP <80 MM HG: CPT | Mod: CPTII,S$GLB,, | Performed by: PODIATRIST

## 2023-06-01 PROCEDURE — 3078F PR MOST RECENT DIASTOLIC BLOOD PRESSURE < 80 MM HG: ICD-10-PCS | Mod: CPTII,S$GLB,, | Performed by: PODIATRIST

## 2023-06-01 PROCEDURE — 3066F PR DOCUMENTATION OF TREATMENT FOR NEPHROPATHY: ICD-10-PCS | Mod: CPTII,S$GLB,, | Performed by: PODIATRIST

## 2023-06-01 PROCEDURE — 3046F PR MOST RECENT HEMOGLOBIN A1C LEVEL > 9.0%: ICD-10-PCS | Mod: CPTII,S$GLB,, | Performed by: PODIATRIST

## 2023-06-01 PROCEDURE — 11042 DBRDMT SUBQ TIS 1ST 20SQCM/<: CPT | Mod: RT,S$GLB,, | Performed by: PODIATRIST

## 2023-06-01 PROCEDURE — 3060F POS MICROALBUMINURIA REV: CPT | Mod: CPTII,S$GLB,, | Performed by: PODIATRIST

## 2023-06-02 ENCOUNTER — PATIENT MESSAGE (OUTPATIENT)
Dept: ADMINISTRATIVE | Facility: HOSPITAL | Age: 58
End: 2023-06-02
Payer: COMMERCIAL

## 2023-06-06 ENCOUNTER — TELEPHONE (OUTPATIENT)
Dept: PODIATRY | Facility: CLINIC | Age: 58
End: 2023-06-06
Payer: COMMERCIAL

## 2023-06-06 NOTE — TELEPHONE ENCOUNTER
Spoke to pt and rescheduled appt. Original appt:6/8/2023  New Appt:6/9/2023 at 10:30 Dr. Weinberg. Pt verbalized understanding.

## 2023-06-09 ENCOUNTER — OFFICE VISIT (OUTPATIENT)
Dept: INTERNAL MEDICINE | Facility: CLINIC | Age: 58
End: 2023-06-09
Payer: COMMERCIAL

## 2023-06-09 ENCOUNTER — OFFICE VISIT (OUTPATIENT)
Dept: PODIATRY | Facility: CLINIC | Age: 58
End: 2023-06-09
Payer: COMMERCIAL

## 2023-06-09 VITALS
BODY MASS INDEX: 37.02 KG/M2 | TEMPERATURE: 97 F | HEART RATE: 59 BPM | WEIGHT: 288.5 LBS | HEIGHT: 74 IN | DIASTOLIC BLOOD PRESSURE: 80 MMHG | OXYGEN SATURATION: 98 % | SYSTOLIC BLOOD PRESSURE: 110 MMHG

## 2023-06-09 VITALS
DIASTOLIC BLOOD PRESSURE: 62 MMHG | SYSTOLIC BLOOD PRESSURE: 106 MMHG | WEIGHT: 288 LBS | HEART RATE: 52 BPM | HEIGHT: 74 IN | BODY MASS INDEX: 36.96 KG/M2

## 2023-06-09 DIAGNOSIS — E11.51 DIABETES MELLITUS WITH PERIPHERAL CIRCULATORY DISORDER: ICD-10-CM

## 2023-06-09 DIAGNOSIS — Z12.5 PROSTATE CANCER SCREENING: ICD-10-CM

## 2023-06-09 DIAGNOSIS — E11.49 TYPE II DIABETES MELLITUS WITH NEUROLOGICAL MANIFESTATIONS: ICD-10-CM

## 2023-06-09 DIAGNOSIS — E78.5 DYSLIPIDEMIA ASSOCIATED WITH TYPE 2 DIABETES MELLITUS: ICD-10-CM

## 2023-06-09 DIAGNOSIS — I15.2 HYPERTENSION ASSOCIATED WITH DIABETES: Chronic | ICD-10-CM

## 2023-06-09 DIAGNOSIS — Z79.4 DIABETES MELLITUS WITH INSULIN THERAPY: ICD-10-CM

## 2023-06-09 DIAGNOSIS — E11.9 DIABETES MELLITUS WITH INSULIN THERAPY: ICD-10-CM

## 2023-06-09 DIAGNOSIS — I25.10 CORONARY ARTERY DISEASE INVOLVING NATIVE CORONARY ARTERY OF NATIVE HEART WITHOUT ANGINA PECTORIS: ICD-10-CM

## 2023-06-09 DIAGNOSIS — E11.69 DYSLIPIDEMIA ASSOCIATED WITH TYPE 2 DIABETES MELLITUS: ICD-10-CM

## 2023-06-09 DIAGNOSIS — L97.512 FOOT ULCER, RIGHT, WITH FAT LAYER EXPOSED: Primary | ICD-10-CM

## 2023-06-09 DIAGNOSIS — E11.59 HYPERTENSION ASSOCIATED WITH DIABETES: Chronic | ICD-10-CM

## 2023-06-09 DIAGNOSIS — Z85.038 HX OF COLON CANCER, STAGE I: ICD-10-CM

## 2023-06-09 DIAGNOSIS — Z00.00 ENCOUNTER FOR PREVENTIVE HEALTH EXAMINATION: Primary | ICD-10-CM

## 2023-06-09 PROCEDURE — 3060F PR POS MICROALBUMINURIA RESULT DOCUMENTED/REVIEW: ICD-10-PCS | Mod: CPTII,S$GLB,, | Performed by: INTERNAL MEDICINE

## 2023-06-09 PROCEDURE — 3066F PR DOCUMENTATION OF TREATMENT FOR NEPHROPATHY: ICD-10-PCS | Mod: CPTII,S$GLB,, | Performed by: INTERNAL MEDICINE

## 2023-06-09 PROCEDURE — 3060F POS MICROALBUMINURIA REV: CPT | Mod: CPTII,S$GLB,, | Performed by: INTERNAL MEDICINE

## 2023-06-09 PROCEDURE — 3046F HEMOGLOBIN A1C LEVEL >9.0%: CPT | Mod: CPTII,S$GLB,, | Performed by: INTERNAL MEDICINE

## 2023-06-09 PROCEDURE — 3066F NEPHROPATHY DOC TX: CPT | Mod: CPTII,S$GLB,, | Performed by: INTERNAL MEDICINE

## 2023-06-09 PROCEDURE — 3074F PR MOST RECENT SYSTOLIC BLOOD PRESSURE < 130 MM HG: ICD-10-PCS | Mod: CPTII,S$GLB,, | Performed by: INTERNAL MEDICINE

## 2023-06-09 PROCEDURE — 99999 PR PBB SHADOW E&M-EST. PATIENT-LVL V: CPT | Mod: PBBFAC,,, | Performed by: PODIATRIST

## 2023-06-09 PROCEDURE — 3079F DIAST BP 80-89 MM HG: CPT | Mod: CPTII,S$GLB,, | Performed by: INTERNAL MEDICINE

## 2023-06-09 PROCEDURE — 3008F PR BODY MASS INDEX (BMI) DOCUMENTED: ICD-10-PCS | Mod: CPTII,S$GLB,, | Performed by: INTERNAL MEDICINE

## 2023-06-09 PROCEDURE — 99999 PR PBB SHADOW E&M-EST. PATIENT-LVL V: CPT | Mod: PBBFAC,,, | Performed by: INTERNAL MEDICINE

## 2023-06-09 PROCEDURE — 3046F PR MOST RECENT HEMOGLOBIN A1C LEVEL > 9.0%: ICD-10-PCS | Mod: CPTII,S$GLB,, | Performed by: INTERNAL MEDICINE

## 2023-06-09 PROCEDURE — 99999 PR PBB SHADOW E&M-EST. PATIENT-LVL V: ICD-10-PCS | Mod: PBBFAC,,, | Performed by: PODIATRIST

## 2023-06-09 PROCEDURE — 99499 NO LOS: ICD-10-PCS | Mod: S$GLB,,, | Performed by: PODIATRIST

## 2023-06-09 PROCEDURE — 99499 UNLISTED E&M SERVICE: CPT | Mod: S$GLB,,, | Performed by: PODIATRIST

## 2023-06-09 PROCEDURE — 11042 WOUND DEBRIDEMENT: ICD-10-PCS | Mod: S$GLB,,, | Performed by: PODIATRIST

## 2023-06-09 PROCEDURE — 99396 PREV VISIT EST AGE 40-64: CPT | Mod: S$GLB,,, | Performed by: INTERNAL MEDICINE

## 2023-06-09 PROCEDURE — 3008F BODY MASS INDEX DOCD: CPT | Mod: CPTII,S$GLB,, | Performed by: INTERNAL MEDICINE

## 2023-06-09 PROCEDURE — 11042 DBRDMT SUBQ TIS 1ST 20SQCM/<: CPT | Mod: S$GLB,,, | Performed by: PODIATRIST

## 2023-06-09 PROCEDURE — 99396 PR PREVENTIVE VISIT,EST,40-64: ICD-10-PCS | Mod: S$GLB,,, | Performed by: INTERNAL MEDICINE

## 2023-06-09 PROCEDURE — 4010F PR ACE/ARB THEARPY RXD/TAKEN: ICD-10-PCS | Mod: CPTII,S$GLB,, | Performed by: INTERNAL MEDICINE

## 2023-06-09 PROCEDURE — 3079F PR MOST RECENT DIASTOLIC BLOOD PRESSURE 80-89 MM HG: ICD-10-PCS | Mod: CPTII,S$GLB,, | Performed by: INTERNAL MEDICINE

## 2023-06-09 PROCEDURE — 3074F SYST BP LT 130 MM HG: CPT | Mod: CPTII,S$GLB,, | Performed by: INTERNAL MEDICINE

## 2023-06-09 PROCEDURE — 99999 PR PBB SHADOW E&M-EST. PATIENT-LVL V: ICD-10-PCS | Mod: PBBFAC,,, | Performed by: INTERNAL MEDICINE

## 2023-06-09 PROCEDURE — 4010F ACE/ARB THERAPY RXD/TAKEN: CPT | Mod: CPTII,S$GLB,, | Performed by: INTERNAL MEDICINE

## 2023-06-09 NOTE — PROGRESS NOTES
Subjective:      Patient ID: Billy Rivera is a 58 y.o. male.    Chief Complaint: Wound Care (1 wk wound f/u)      Billy is a 58 y.o. male who presents to the clinic for evaluation and treatment of high risk feet. Billy has a past medical history of Allergy, CAD (coronary artery disease), native coronary artery (6/25/2013), Cancer, COVID-19 virus detected (9/1/2020), Diabetes mellitus, Diabetes mellitus, type 2, Disorder of kidney and ureter, Heart attack (04/2012), colon cancer, stage I, Hyperlipidemia, Hypertension, Muscular pain, NSTEMI May 2013 - peak troponin 0.22 (5/22/2013), MICHAELA (obstructive sleep apnea), and Retinopathy due to secondary diabetes.  \  Patient presents for follow-up wound care.   PCP: BRAD Baker MD    Date Last Seen by PCP: 3/23/2022    Current shoe gear:  Affected Foot: DARCO Shoe right foot     Affected Foot: Tennis shoe left foot    History of Trauma: negative  Sign of Infection: none        Hemoglobin A1C   Date Value Ref Range Status   04/06/2023 9.4 (H) 4.0 - 5.6 % Final     Comment:     ADA Screening Guidelines:  5.7-6.4%  Consistent with prediabetes  >or=6.5%  Consistent with diabetes    High levels of fetal hemoglobin interfere with the HbA1C  assay. Heterozygous hemoglobin variants (HbS, HgC, etc)do  not significantly interfere with this assay.   However, presence of multiple variants may affect accuracy.     11/10/2022 7.1 (H) 4.0 - 5.6 % Final     Comment:     ADA Screening Guidelines:  5.7-6.4%  Consistent with prediabetes  >or=6.5%  Consistent with diabetes    High levels of fetal hemoglobin interfere with the HbA1C  assay. Heterozygous hemoglobin variants (HbS, HgC, etc)do  not significantly interfere with this assay.   However, presence of multiple variants may affect accuracy.     08/09/2022 8.0 (H) 4.0 - 5.6 % Final     Comment:     ADA Screening Guidelines:  5.7-6.4%  Consistent with prediabetes  >or=6.5%  Consistent with diabetes    High levels of fetal  hemoglobin interfere with the HbA1C  assay. Heterozygous hemoglobin variants (HbS, HgC, etc)do  not significantly interfere with this assay.   However, presence of multiple variants may affect accuracy.                 Objective:        Physical Exam  Constitutional:       General: He is not in acute distress.     Appearance: Normal appearance. He is well-developed.   HENT:      Head: Normocephalic and atraumatic.   Cardiovascular:      Pulses:           Dorsalis pedis pulses are 2+ on the right side and 2+ on the left side.        Posterior tibial pulses are 2+ on the right side and 2+ on the left side.      Comments: CFT< 3 secs all toes bilateral foot, skin temp warm to cool bilateral foot, no hair growth bilateral lower extremity, no edema to bilateral lower extremities    Pulmonary:      Effort: Pulmonary effort is normal.   Musculoskeletal:         General: No swelling.      Right foot: Normal range of motion. No deformity.      Left foot: Normal range of motion. No deformity.      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     5/5 muscle strength with DF/PF/Inv/Ev bilateral.    Inspection and palpation of the joints and bones reveal no crepitus or joint effusion. No tenderness upon palpation.  Angle and base of gait are normal.    Feet:      Right foot:      Skin integrity: No skin breakdown or erythema.      Left foot:      Skin integrity: No skin breakdown or erythema.   Skin:     General: Skin is warm and dry.      Capillary Refill: Capillary refill takes 2 to 3 seconds.      Findings: No erythema.      Nails: There is no clubbing.      Comments: Ulceration location:  Ulcerations bilateral 2nd MPJ right foot noted to have an ulceration to the muscle tissue with no sign of infection.  This ulceration measures 0.1 cm in diameter by 0.1 cm in depth, post debridement.   Neurological:      Mental Status: He is alert and oriented to person, place, and time.       "Sensory: Sensory deficit present.      Motor: No weakness.      Deep Tendon Reflexes:      Reflex Scores:       Patellar reflexes are 2+ on the right side and 2+ on the left side.       Achilles reflexes are 2+ on the right side and 2+ on the left side.     Comments: Diminished/loss of protective sensation all toes bilateral to 10 gram monofilament.   Psychiatric:         Mood and Affect: Mood normal.         Behavior: Behavior normal.         Thought Content: Thought content normal.             Assessment:       Encounter Diagnoses   Name Primary?    Foot ulcer, right, with fat layer exposed Yes    Type II diabetes mellitus with neurological manifestations                Plan:       Billy was seen today for wound care.    Diagnoses and all orders for this visit:    Foot ulcer, right, with fat layer exposed    Type II diabetes mellitus with neurological manifestations    Other orders  -     Wound Debridement          Wound Debridement    Date/Time: 6/9/2023 10:30 AM  Performed by: Osiris Weinberg DPM  Authorized by: Osiris Weinberg DPM     Time out: Immediately prior to procedure a "time out" was called to verify the correct patient, procedure, equipment, support staff and site/side marked as required.    Consent Done?:  Yes (Verbal)    Preparation: Patient was prepped and draped in usual sterile fashion    Local anesthesia used?: No      Wound Details:    Location:  Right foot    Location:  Right Plantar    Type of Debridement:  Excisional       Length (cm):  0.2       Area (sq cm):  0.02       Width (cm):  0.1       Percent Debrided (%):  100       Depth (cm):  0.1       Total Area Debrided (sq cm):  0.02    Depth of debridement:  Subcutaneous tissue    Tissue debrided:  Epidermis, Dermis and Subcutaneous    Devitalized tissue debrided:  Slough, Fibrin, Callus and Biofilm    Instruments:  Curette and Blade  Bleeding:  Minimal  Hemostasis Achieved: Yes  Method Used:  Pressure    Additional wounds:  1    2nd Wound " Details:     Location:  Right foot    Location:  Right Midfoot    Location:  Right Midfoot    Type of Debridement:  Excisional       Length (cm):  0.5       Area (sq cm):  0.2       Width (cm):  0.4       Percent Debrided (%):  100       Depth (cm):  0.1       Total Area Debrided (sq cm):  0.2    Depth of debridement:  Subcutaneous tissue    Tissue debrided:  Epidermis, Dermis and Subcutaneous    Devitalized tissue debrided:  Slough, Fibrin, Callus and Biofilm    Instruments:  Curette and Blade  Bleeding:  Minimal  Hemostasis Achieved: Yes    Method Used:  Pressure  Patient tolerance:  Patient tolerated the procedure well with no immediate complications  1st Wound Pain Assessment: 0  2nd Wound Pain Assessment: 0     Leticia and football dressing applied.   No immediate complications.

## 2023-06-09 NOTE — PROGRESS NOTES
History of present illness:   58-year-old gentleman in today for general health assessment.      Current medications:  Medications all noted reviewed.      Review of systems:  General: no fever, chills, generalized body aches. No unexpected weight loss.  Eyes:  No visual disturbances.  HEENT:  No hoarseness, dysphagia, ear pain.  Respiratory:  No cough, no shortness of breath.  Cardiovascular: no chest pain, palpitations, cough, exertional limb pain. No edema.  GI: no nausea, vomiting.  No abdominal pain. No change in bowel habits.  No melena, no hematochezia.  : no dysuria. No change in the color or character of the urine. No urinary frequency.  Musculoskeletal: no joint pain or swelling.  Neurologic:  No focal neurological complaints.  No headaches.  Skin:  No rashes or other concerns.  Psych:  No emotional issues    Health screenings:  Colonoscopy February 2017.    He has had the pneumococcal vaccines.  Eye exam due.    Physical examination:  GENERAL:  Alert, appropriately groomed, no acute distress.  VS:  Blood pressure 118/80  EYES: sclerae white ,nonicteric. PERRL.  HEENT:  Normocephalic. Ear canals and tympanic membranes normal. Mouth and pharynx normal. No thyromegaly. Trachea midline and freely mobile.  LUNGS:  Clear to ascultation and normal to percussion.  CARDIOVASCULAR:  Normal heart sounds.  No significant murmur. Carotids full bilaterally without bruit.   .  No abdominal bruit.  No peripheral extremity edema.  GI: the abdomen is soft, no distension. No masses , tenderness, organomegaly.   : scrotum, testicles and penis normal.   LYMPHATIC:  No axillary, inguinal , cervical adenopathy.  MUSCULOSKELETAL:  Range of motion, stability and strength of the right and left upper and lower extremities normal. No swollen or tender joints  NEUROLOGIC:  DTR's normal. No gross motor deficits apparent, gait normal.  SKIN:  No rashes.   MS:  Alert, oriented , affect and mood all appropriate    Data:  Reviewed  most recent lab data.  He is also scheduled for lab data on June 30th of this year to include lipids, chemistry profile glycohemoglobin.        Impression:   [age] year gentleman with several chronic medical issues.      Insulin controlled diabetes mellitus followed by Endocrinology.      Hypertension controlled.      Dyslipidemia on statin therapy at goal.      CAD asymptomatic.      History is localized stage I colon cancer due for five year surveillance colonoscopy.      Chronic foot ulcer followed regularly by Podiatry.        Plan:   Will add a TSH and PSA to the scheduled lab on June 30, 2023.    Referral to cardiology to establish for CAD no view of high risk factors.    Return clinic six months follow-up.

## 2023-06-11 NOTE — PROGRESS NOTES
Subjective:      Patient ID: Billy Rivera is a 58 y.o. male.    Chief Complaint: Wound Check (Right foot /Drainage present )      Billy is a 58 y.o. male who presents to the clinic for evaluation and treatment of high risk feet. Billy has a past medical history of Allergy, CAD (coronary artery disease), native coronary artery (6/25/2013), Cancer, COVID-19 virus detected (9/1/2020), Diabetes mellitus, Diabetes mellitus, type 2, Disorder of kidney and ureter, Heart attack (04/2012), colon cancer, stage I, Hyperlipidemia, Hypertension, Muscular pain, NSTEMI May 2013 - peak troponin 0.22 (5/22/2013), MICHAELA (obstructive sleep apnea), and Retinopathy due to secondary diabetes.    Patient presents for follow-up wound care.   PCP: BRAD Baker MD    Date Last Seen by PCP: 3/23/2022    Current shoe gear:  Affected Foot: DARCO Shoe right foot     Affected Foot: Tennis shoe left foot    History of Trauma: negative  Sign of Infection: none    Hemoglobin A1C   Date Value Ref Range Status   04/06/2023 9.4 (H) 4.0 - 5.6 % Final     Comment:     ADA Screening Guidelines:  5.7-6.4%  Consistent with prediabetes  >or=6.5%  Consistent with diabetes    High levels of fetal hemoglobin interfere with the HbA1C  assay. Heterozygous hemoglobin variants (HbS, HgC, etc)do  not significantly interfere with this assay.   However, presence of multiple variants may affect accuracy.     11/10/2022 7.1 (H) 4.0 - 5.6 % Final     Comment:     ADA Screening Guidelines:  5.7-6.4%  Consistent with prediabetes  >or=6.5%  Consistent with diabetes    High levels of fetal hemoglobin interfere with the HbA1C  assay. Heterozygous hemoglobin variants (HbS, HgC, etc)do  not significantly interfere with this assay.   However, presence of multiple variants may affect accuracy.     08/09/2022 8.0 (H) 4.0 - 5.6 % Final     Comment:     ADA Screening Guidelines:  5.7-6.4%  Consistent with prediabetes  >or=6.5%  Consistent with diabetes    High  levels of fetal hemoglobin interfere with the HbA1C  assay. Heterozygous hemoglobin variants (HbS, HgC, etc)do  not significantly interfere with this assay.   However, presence of multiple variants may affect accuracy.         Review of Systems   Constitutional: Negative for chills, decreased appetite, fever, malaise/fatigue and night sweats.   HENT:  Negative for congestion, hearing loss, nosebleeds and tinnitus.    Eyes:  Negative for double vision, pain, photophobia and visual disturbance.   Cardiovascular:  Negative for chest pain, claudication, cyanosis and leg swelling.   Respiratory:  Negative for cough, hemoptysis, shortness of breath and wheezing.    Endocrine: Negative for cold intolerance and heat intolerance.   Hematologic/Lymphatic: Negative for adenopathy and bleeding problem.   Skin:  Positive for color change, dry skin, nail changes, poor wound healing and suspicious lesions. Negative for itching and rash.   Musculoskeletal:  Negative for arthritis, falls, gout, joint pain, myalgias and stiffness.   Gastrointestinal:  Negative for abdominal pain, jaundice, nausea and vomiting.   Genitourinary:  Negative for dysuria, frequency and hematuria.   Neurological:  Positive for numbness and sensory change. Negative for difficulty with concentration, loss of balance and paresthesias.   Psychiatric/Behavioral:  Negative for altered mental status, hallucinations and suicidal ideas. The patient is not nervous/anxious.    Allergic/Immunologic: Negative for environmental allergies and persistent infections.         Objective:        Physical Exam  Constitutional:       General: He is not in acute distress.     Appearance: Normal appearance. He is well-developed.   HENT:      Head: Normocephalic and atraumatic.   Cardiovascular:      Pulses:           Dorsalis pedis pulses are 2+ on the right side and 2+ on the left side.        Posterior tibial pulses are 2+ on the right side and 2+ on the left side.       Comments: CFT< 3 secs all toes bilateral foot, skin temp warm to cool bilateral foot, no hair growth bilateral lower extremity, no edema to bilateral lower extremities    Pulmonary:      Effort: Pulmonary effort is normal.   Musculoskeletal:         General: No swelling.      Right foot: Normal range of motion. No deformity.      Left foot: Normal range of motion. No deformity.      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     5/5 muscle strength with DF/PF/Inv/Ev bilateral.    Inspection and palpation of the joints and bones reveal no crepitus or joint effusion. No tenderness upon palpation.  Angle and base of gait are normal.    Feet:      Right foot:      Skin integrity: No skin breakdown or erythema.      Left foot:      Skin integrity: No skin breakdown or erythema.   Skin:     General: Skin is warm and dry.      Capillary Refill: Capillary refill takes 2 to 3 seconds.      Findings: No erythema.      Nails: There is no clubbing.      Comments: Ulceration location:  Ulcerations bilateral 2nd MPJ right foot noted to have an ulceration to the muscle tissue with no sign of infection.  This ulceration measures 0.1 cm in diameter by 0.1 cm in depth, post debridement.   Neurological:      Mental Status: He is alert and oriented to person, place, and time.      Sensory: Sensory deficit present.      Motor: No weakness.      Deep Tendon Reflexes:      Reflex Scores:       Patellar reflexes are 2+ on the right side and 2+ on the left side.       Achilles reflexes are 2+ on the right side and 2+ on the left side.     Comments: Diminished/loss of protective sensation all toes bilateral to 10 gram monofilament.   Psychiatric:         Mood and Affect: Mood normal.         Behavior: Behavior normal.         Thought Content: Thought content normal.     Wound size unchanged compared to last visit.        Assessment:       Encounter Diagnoses   Name Primary?    Type II diabetes  "mellitus with neurological manifestations Yes    Foot ulcer, right, with fat layer exposed                Plan:       Billy was seen today for wound check.    Diagnoses and all orders for this visit:    Type II diabetes mellitus with neurological manifestations    Foot ulcer, right, with fat layer exposed            Wound Debridement/post debridement    Performed by: Mingo Melara DPM   Authorized by: Patient    Time out: Immediately prior to procedure a "time out" was called to verify the correct patient, procedure, equipment, support staff and site/side marked as required.   Consent Done?:  Yes (Verbal)  Local anesthesia used?: No       Wound Details:    Location:  right foot     Type of Debridement:  Excisional       Length (cm):  0.1       Width (cm):  0.1       Depth (cm):  0.1       Percent Debrided (%):  100             Depth of debridement:  Subcutaneous tissue/muscle tissue    Tissue debrided:  Dermis, Epidermis and Subcutaneous    Devitalized tissue debrided:  Biofilm, Callus and Necrotic/Eschar    Instruments:  Blade, Curette and Nippers     Bleeding:  Minimal  Hemostasis Achieved: Yes    Method Used:  Pressure  Patient tolerance:  Patient tolerated the procedure well with no immediate complications    Wound care discussed in detail, football dressing applied today with Iodosorb with football dressing.  Follow-up in 1 week for re-evaluation.      The nature of the condition, options for management, as well as potential risks and complications were discussed in detail with patient. Patient was amenable to my recommendations and left my office fully informed and will follow up as instructed or sooner if necessary.                  "

## 2023-06-19 ENCOUNTER — OFFICE VISIT (OUTPATIENT)
Dept: PODIATRY | Facility: CLINIC | Age: 58
End: 2023-06-19
Payer: COMMERCIAL

## 2023-06-19 VITALS
WEIGHT: 288 LBS | DIASTOLIC BLOOD PRESSURE: 93 MMHG | SYSTOLIC BLOOD PRESSURE: 164 MMHG | HEIGHT: 74 IN | HEART RATE: 81 BPM | BODY MASS INDEX: 36.96 KG/M2

## 2023-06-19 DIAGNOSIS — L97.512 FOOT ULCER, RIGHT, WITH FAT LAYER EXPOSED: Primary | ICD-10-CM

## 2023-06-19 DIAGNOSIS — E11.49 TYPE II DIABETES MELLITUS WITH NEUROLOGICAL MANIFESTATIONS: ICD-10-CM

## 2023-06-19 PROCEDURE — 3080F PR MOST RECENT DIASTOLIC BLOOD PRESSURE >= 90 MM HG: ICD-10-PCS | Mod: CPTII,S$GLB,, | Performed by: PODIATRIST

## 2023-06-19 PROCEDURE — 3008F BODY MASS INDEX DOCD: CPT | Mod: CPTII,S$GLB,, | Performed by: PODIATRIST

## 2023-06-19 PROCEDURE — 3060F PR POS MICROALBUMINURIA RESULT DOCUMENTED/REVIEW: ICD-10-PCS | Mod: CPTII,S$GLB,, | Performed by: PODIATRIST

## 2023-06-19 PROCEDURE — 4010F PR ACE/ARB THEARPY RXD/TAKEN: ICD-10-PCS | Mod: CPTII,S$GLB,, | Performed by: PODIATRIST

## 2023-06-19 PROCEDURE — 3080F DIAST BP >= 90 MM HG: CPT | Mod: CPTII,S$GLB,, | Performed by: PODIATRIST

## 2023-06-19 PROCEDURE — 99213 PR OFFICE/OUTPT VISIT, EST, LEVL III, 20-29 MIN: ICD-10-PCS | Mod: S$GLB,,, | Performed by: PODIATRIST

## 2023-06-19 PROCEDURE — 3008F PR BODY MASS INDEX (BMI) DOCUMENTED: ICD-10-PCS | Mod: CPTII,S$GLB,, | Performed by: PODIATRIST

## 2023-06-19 PROCEDURE — 99999 PR PBB SHADOW E&M-EST. PATIENT-LVL IV: CPT | Mod: PBBFAC,,, | Performed by: PODIATRIST

## 2023-06-19 PROCEDURE — 3077F PR MOST RECENT SYSTOLIC BLOOD PRESSURE >= 140 MM HG: ICD-10-PCS | Mod: CPTII,S$GLB,, | Performed by: PODIATRIST

## 2023-06-19 PROCEDURE — 3077F SYST BP >= 140 MM HG: CPT | Mod: CPTII,S$GLB,, | Performed by: PODIATRIST

## 2023-06-19 PROCEDURE — 3066F NEPHROPATHY DOC TX: CPT | Mod: CPTII,S$GLB,, | Performed by: PODIATRIST

## 2023-06-19 PROCEDURE — 4010F ACE/ARB THERAPY RXD/TAKEN: CPT | Mod: CPTII,S$GLB,, | Performed by: PODIATRIST

## 2023-06-19 PROCEDURE — 3046F PR MOST RECENT HEMOGLOBIN A1C LEVEL > 9.0%: ICD-10-PCS | Mod: CPTII,S$GLB,, | Performed by: PODIATRIST

## 2023-06-19 PROCEDURE — 3046F HEMOGLOBIN A1C LEVEL >9.0%: CPT | Mod: CPTII,S$GLB,, | Performed by: PODIATRIST

## 2023-06-19 PROCEDURE — 3060F POS MICROALBUMINURIA REV: CPT | Mod: CPTII,S$GLB,, | Performed by: PODIATRIST

## 2023-06-19 PROCEDURE — 99213 OFFICE O/P EST LOW 20 MIN: CPT | Mod: S$GLB,,, | Performed by: PODIATRIST

## 2023-06-19 PROCEDURE — 3066F PR DOCUMENTATION OF TREATMENT FOR NEPHROPATHY: ICD-10-PCS | Mod: CPTII,S$GLB,, | Performed by: PODIATRIST

## 2023-06-19 PROCEDURE — 99999 PR PBB SHADOW E&M-EST. PATIENT-LVL IV: ICD-10-PCS | Mod: PBBFAC,,, | Performed by: PODIATRIST

## 2023-06-21 ENCOUNTER — PATIENT OUTREACH (OUTPATIENT)
Dept: ADMINISTRATIVE | Facility: HOSPITAL | Age: 58
End: 2023-06-21
Payer: COMMERCIAL

## 2023-06-21 NOTE — PROGRESS NOTES
Health Maintenance Due   Topic Date Due    Eye Exam  08/15/2020    Shingles Vaccine (2 of 2) 11/23/2020    COVID-19 Vaccine (3 - Moderna series) 06/23/2021     Chart reviewed.   Immunizations: Reconciled  Orders placed: N/A  Upcoming appts to satisfy [facility] topics: Labs 6/30/2023

## 2023-06-28 ENCOUNTER — OFFICE VISIT (OUTPATIENT)
Dept: PODIATRY | Facility: CLINIC | Age: 58
End: 2023-06-28
Payer: COMMERCIAL

## 2023-06-28 VITALS
HEIGHT: 74 IN | HEART RATE: 60 BPM | SYSTOLIC BLOOD PRESSURE: 130 MMHG | BODY MASS INDEX: 36.95 KG/M2 | DIASTOLIC BLOOD PRESSURE: 73 MMHG | WEIGHT: 287.94 LBS

## 2023-06-28 DIAGNOSIS — E11.49 TYPE II DIABETES MELLITUS WITH NEUROLOGICAL MANIFESTATIONS: ICD-10-CM

## 2023-06-28 DIAGNOSIS — L97.512 FOOT ULCER, RIGHT, WITH FAT LAYER EXPOSED: Primary | ICD-10-CM

## 2023-06-28 PROCEDURE — 3046F PR MOST RECENT HEMOGLOBIN A1C LEVEL > 9.0%: ICD-10-PCS | Mod: CPTII,S$GLB,, | Performed by: PODIATRIST

## 2023-06-28 PROCEDURE — 99999 PR PBB SHADOW E&M-EST. PATIENT-LVL IV: CPT | Mod: PBBFAC,,, | Performed by: PODIATRIST

## 2023-06-28 PROCEDURE — 3075F PR MOST RECENT SYSTOLIC BLOOD PRESS GE 130-139MM HG: ICD-10-PCS | Mod: CPTII,S$GLB,, | Performed by: PODIATRIST

## 2023-06-28 PROCEDURE — 99999 PR PBB SHADOW E&M-EST. PATIENT-LVL IV: ICD-10-PCS | Mod: PBBFAC,,, | Performed by: PODIATRIST

## 2023-06-28 PROCEDURE — 3046F HEMOGLOBIN A1C LEVEL >9.0%: CPT | Mod: CPTII,S$GLB,, | Performed by: PODIATRIST

## 2023-06-28 PROCEDURE — 4010F ACE/ARB THERAPY RXD/TAKEN: CPT | Mod: CPTII,S$GLB,, | Performed by: PODIATRIST

## 2023-06-28 PROCEDURE — 1159F PR MEDICATION LIST DOCUMENTED IN MEDICAL RECORD: ICD-10-PCS | Mod: CPTII,S$GLB,, | Performed by: PODIATRIST

## 2023-06-28 PROCEDURE — 99213 PR OFFICE/OUTPT VISIT, EST, LEVL III, 20-29 MIN: ICD-10-PCS | Mod: 25,S$GLB,, | Performed by: PODIATRIST

## 2023-06-28 PROCEDURE — 3060F PR POS MICROALBUMINURIA RESULT DOCUMENTED/REVIEW: ICD-10-PCS | Mod: CPTII,S$GLB,, | Performed by: PODIATRIST

## 2023-06-28 PROCEDURE — 3060F POS MICROALBUMINURIA REV: CPT | Mod: CPTII,S$GLB,, | Performed by: PODIATRIST

## 2023-06-28 PROCEDURE — 3008F BODY MASS INDEX DOCD: CPT | Mod: CPTII,S$GLB,, | Performed by: PODIATRIST

## 2023-06-28 PROCEDURE — 1159F MED LIST DOCD IN RCRD: CPT | Mod: CPTII,S$GLB,, | Performed by: PODIATRIST

## 2023-06-28 PROCEDURE — 3078F DIAST BP <80 MM HG: CPT | Mod: CPTII,S$GLB,, | Performed by: PODIATRIST

## 2023-06-28 PROCEDURE — 11042 PR DEBRIDEMENT, SKIN, SUB-Q TISSUE,=<20 SQ CM: ICD-10-PCS | Mod: S$GLB,,, | Performed by: PODIATRIST

## 2023-06-28 PROCEDURE — 3078F PR MOST RECENT DIASTOLIC BLOOD PRESSURE < 80 MM HG: ICD-10-PCS | Mod: CPTII,S$GLB,, | Performed by: PODIATRIST

## 2023-06-28 PROCEDURE — 11042 DBRDMT SUBQ TIS 1ST 20SQCM/<: CPT | Mod: S$GLB,,, | Performed by: PODIATRIST

## 2023-06-28 PROCEDURE — 3066F PR DOCUMENTATION OF TREATMENT FOR NEPHROPATHY: ICD-10-PCS | Mod: CPTII,S$GLB,, | Performed by: PODIATRIST

## 2023-06-28 PROCEDURE — 3075F SYST BP GE 130 - 139MM HG: CPT | Mod: CPTII,S$GLB,, | Performed by: PODIATRIST

## 2023-06-28 PROCEDURE — 99213 OFFICE O/P EST LOW 20 MIN: CPT | Mod: 25,S$GLB,, | Performed by: PODIATRIST

## 2023-06-28 PROCEDURE — 4010F PR ACE/ARB THEARPY RXD/TAKEN: ICD-10-PCS | Mod: CPTII,S$GLB,, | Performed by: PODIATRIST

## 2023-06-28 PROCEDURE — 3008F PR BODY MASS INDEX (BMI) DOCUMENTED: ICD-10-PCS | Mod: CPTII,S$GLB,, | Performed by: PODIATRIST

## 2023-06-28 PROCEDURE — 3066F NEPHROPATHY DOC TX: CPT | Mod: CPTII,S$GLB,, | Performed by: PODIATRIST

## 2023-06-28 RX ORDER — DOXYCYCLINE 100 MG/1
100 TABLET ORAL 2 TIMES DAILY
Qty: 20 TABLET | Refills: 0 | Status: SHIPPED | OUTPATIENT
Start: 2023-06-28 | End: 2024-01-11 | Stop reason: SDUPTHER

## 2023-06-28 NOTE — PROGRESS NOTES
Subjective:      Patient ID: Billy Rivera is a 58 y.o. male.    Chief Complaint: Wound Care      Billy is a 58 y.o. male who presents to the clinic for evaluation and treatment of high risk feet. Billy has a past medical history of Allergy, CAD (coronary artery disease), native coronary artery (6/25/2013), Cancer, COVID-19 virus detected (9/1/2020), Diabetes mellitus, Diabetes mellitus, type 2, Disorder of kidney and ureter, Heart attack (04/2012), colon cancer, stage I, Hyperlipidemia, Hypertension, Muscular pain, NSTEMI May 2013 - peak troponin 0.22 (5/22/2013), MICHAELA (obstructive sleep apnea), and Retinopathy due to secondary diabetes.  Patient presents for follow-up wound care.   PCP: BRAD Baker MD    Date Last Seen by PCP: 3/23/2022    Current shoe gear:  Affected Foot: DARCO Shoe right foot     Affected Foot: Tennis shoe left foot    History of Trauma: negative  Sign of Infection: none        Hemoglobin A1C   Date Value Ref Range Status   04/06/2023 9.4 (H) 4.0 - 5.6 % Final     Comment:     ADA Screening Guidelines:  5.7-6.4%  Consistent with prediabetes  >or=6.5%  Consistent with diabetes    High levels of fetal hemoglobin interfere with the HbA1C  assay. Heterozygous hemoglobin variants (HbS, HgC, etc)do  not significantly interfere with this assay.   However, presence of multiple variants may affect accuracy.     11/10/2022 7.1 (H) 4.0 - 5.6 % Final     Comment:     ADA Screening Guidelines:  5.7-6.4%  Consistent with prediabetes  >or=6.5%  Consistent with diabetes    High levels of fetal hemoglobin interfere with the HbA1C  assay. Heterozygous hemoglobin variants (HbS, HgC, etc)do  not significantly interfere with this assay.   However, presence of multiple variants may affect accuracy.     08/09/2022 8.0 (H) 4.0 - 5.6 % Final     Comment:     ADA Screening Guidelines:  5.7-6.4%  Consistent with prediabetes  >or=6.5%  Consistent with diabetes    High levels of fetal hemoglobin  interfere with the HbA1C  assay. Heterozygous hemoglobin variants (HbS, HgC, etc)do  not significantly interfere with this assay.   However, presence of multiple variants may affect accuracy.                 Objective:        Physical Exam  Constitutional:       General: He is not in acute distress.     Appearance: Normal appearance. He is well-developed.   HENT:      Head: Normocephalic and atraumatic.   Cardiovascular:      Pulses:           Dorsalis pedis pulses are 2+ on the right side and 2+ on the left side.        Posterior tibial pulses are 2+ on the right side and 2+ on the left side.      Comments: CFT< 3 secs all toes bilateral foot, skin temp warm to cool bilateral foot, no hair growth bilateral lower extremity, no edema to bilateral lower extremities    Pulmonary:      Effort: Pulmonary effort is normal.   Musculoskeletal:         General: No swelling.      Right foot: Normal range of motion. No deformity.      Left foot: Normal range of motion. No deformity.      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     5/5 muscle strength with DF/PF/Inv/Ev bilateral.    Inspection and palpation of the joints and bones reveal no crepitus or joint effusion. No tenderness upon palpation.  Angle and base of gait are normal.    Feet:      Right foot:      Skin integrity: No skin breakdown or erythema.      Left foot:      Skin integrity: No skin breakdown or erythema.   Skin:     General: Skin is warm and dry.      Capillary Refill: Capillary refill takes 2 to 3 seconds.      Findings: No erythema.      Nails: There is no clubbing.      Comments: Ulceration location:  Ulcerations bilateral 2nd MPJ right foot noted to have an ulceration to the muscle tissue with no sign of infection.  This ulceration measures 0.1 cm in diameter by 0.1 cm in depth, post debridement.   Neurological:      Mental Status: He is alert and oriented to person, place, and time.      Sensory:  "Sensory deficit present.      Motor: No weakness.      Deep Tendon Reflexes:      Reflex Scores:       Patellar reflexes are 2+ on the right side and 2+ on the left side.       Achilles reflexes are 2+ on the right side and 2+ on the left side.     Comments: Diminished/loss of protective sensation all toes bilateral to 10 gram monofilament.   Psychiatric:         Mood and Affect: Mood normal.         Behavior: Behavior normal.         Thought Content: Thought content normal.             Assessment:       Encounter Diagnoses   Name Primary?    Foot ulcer, right, with fat layer exposed Yes    Type II diabetes mellitus with neurological manifestations                Plan:       Billy was seen today for wound care.    Diagnoses and all orders for this visit:    Foot ulcer, right, with fat layer exposed    Type II diabetes mellitus with neurological manifestations      Wound Debridement    Performed by: Mingo Melara DPM   Authorized by: Patient    Time out: Immediately prior to procedure a "time out" was called to verify the correct patient, procedure, equipment, support staff and site/side marked as required.   Consent Done?:  Yes (Verbal)  Local anesthesia used?: No       Wound Details:    Location:  Right foot     Type of Debridement:  Excisional       Length (cm):  0.1       Width (cm):  0.1       Depth (cm):  0.1       Percent Debrided (%):  100             Depth of debridement:  Subcutaneous tissue    Tissue debrided:  Dermis, Epidermis and Subcutaneous    Devitalized tissue debrided:  Biofilm, Callus and Necrotic/Eschar    Instruments:  Blade, Curette and Nippers     Bleeding:  Minimal  Hemostasis Achieved: Yes    Method Used:  Pressure  Patient tolerance:  Patient tolerated the procedure well with no immediate complications    The nature of the condition, options for management, as well as potential risks and complications were discussed in detail with patient. Patient was amenable to my recommendations " and left my office fully informed and will follow up as instructed or sooner if necessary.      Wound dressing applied as well as Leticia/collagen dressing.  Continue current treatment plan, follow-up in 1 week.

## 2023-06-30 NOTE — PROGRESS NOTES
Subjective:      Patient ID: Billy Rivera is a 58 y.o. male.    Chief Complaint:  Follow-up wound care, right foot.      Billy is a 58 y.o. male who presents to the clinic for evaluation and treatment of high risk feet. Billy has a past medical history of Allergy, CAD (coronary artery disease), native coronary artery (6/25/2013), Cancer, COVID-19 virus detected (9/1/2020), Diabetes mellitus, Diabetes mellitus, type 2, Disorder of kidney and ureter, Heart attack (04/2012), colon cancer, stage I, Hyperlipidemia, Hypertension, Muscular pain, NSTEMI May 2013 - peak troponin 0.22 (5/22/2013), MICHAELA (obstructive sleep apnea), and Retinopathy due to secondary diabetes.  Patient presents for follow-up wound care.  No new complaints.  PCP: BRAD Baker MD    Date Last Seen by PCP: 3/23/2022    Current shoe gear:  Affected Foot: DARCO Shoe right foot     Affected Foot: Tennis shoe left foot    History of Trauma: negative  Sign of Infection: none        Hemoglobin A1C   Date Value Ref Range Status   04/06/2023 9.4 (H) 4.0 - 5.6 % Final     Comment:     ADA Screening Guidelines:  5.7-6.4%  Consistent with prediabetes  >or=6.5%  Consistent with diabetes    High levels of fetal hemoglobin interfere with the HbA1C  assay. Heterozygous hemoglobin variants (HbS, HgC, etc)do  not significantly interfere with this assay.   However, presence of multiple variants may affect accuracy.     11/10/2022 7.1 (H) 4.0 - 5.6 % Final     Comment:     ADA Screening Guidelines:  5.7-6.4%  Consistent with prediabetes  >or=6.5%  Consistent with diabetes    High levels of fetal hemoglobin interfere with the HbA1C  assay. Heterozygous hemoglobin variants (HbS, HgC, etc)do  not significantly interfere with this assay.   However, presence of multiple variants may affect accuracy.     08/09/2022 8.0 (H) 4.0 - 5.6 % Final     Comment:     ADA Screening Guidelines:  5.7-6.4%  Consistent with prediabetes  >or=6.5%  Consistent with  diabetes    High levels of fetal hemoglobin interfere with the HbA1C  assay. Heterozygous hemoglobin variants (HbS, HgC, etc)do  not significantly interfere with this assay.   However, presence of multiple variants may affect accuracy.                 Objective:        Physical Exam  Constitutional:       General: He is not in acute distress.     Appearance: Normal appearance. He is well-developed.   HENT:      Head: Normocephalic and atraumatic.   Cardiovascular:      Pulses:           Dorsalis pedis pulses are 2+ on the right side and 2+ on the left side.        Posterior tibial pulses are 2+ on the right side and 2+ on the left side.      Comments: CFT< 3 secs all toes bilateral foot, skin temp warm to cool bilateral foot, no hair growth bilateral lower extremity, no edema to bilateral lower extremities    Pulmonary:      Effort: Pulmonary effort is normal.   Musculoskeletal:         General: No swelling.      Right foot: Normal range of motion. No deformity.      Left foot: Normal range of motion. No deformity.      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     5/5 muscle strength with DF/PF/Inv/Ev bilateral.    Inspection and palpation of the joints and bones reveal no crepitus or joint effusion. No tenderness upon palpation.  Angle and base of gait are normal.    Feet:      Right foot:      Skin integrity: No skin breakdown or erythema.      Left foot:      Skin integrity: No skin breakdown or erythema.   Skin:     General: Skin is warm and dry.      Capillary Refill: Capillary refill takes 2 to 3 seconds.      Findings: No erythema.      Nails: There is no clubbing.      Comments: Ulceration location:  Ulcerations bilateral 2nd MPJ right foot noted to have an ulceration to the muscle tissue with no sign of infection.  This ulceration measures 0.1 cm in diameter by 0.1 cm in depth, post debridement.   Neurological:      Mental Status: He is alert and oriented to  "person, place, and time.      Sensory: Sensory deficit present.      Motor: No weakness.      Deep Tendon Reflexes:      Reflex Scores:       Patellar reflexes are 2+ on the right side and 2+ on the left side.       Achilles reflexes are 2+ on the right side and 2+ on the left side.     Comments: Diminished/loss of protective sensation all toes bilateral to 10 gram monofilament.   Psychiatric:         Mood and Affect: Mood normal.         Behavior: Behavior normal.         Thought Content: Thought content normal.             Assessment:       Encounter Diagnoses   Name Primary?    Foot ulcer, right, with fat layer exposed Yes    Type II diabetes mellitus with neurological manifestations                Plan:       Diagnoses and all orders for this visit:    Foot ulcer, right, with fat layer exposed    Type II diabetes mellitus with neurological manifestations    Other orders  -     doxycycline monohydrate 100 mg Tab; Take 1 tablet (100 mg total) by mouth 2 (two) times daily.      Wound Debridement    Performed by: Mingo Melara DPM   Authorized by: Patient    Time out: Immediately prior to procedure a "time out" was called to verify the correct patient, procedure, equipment, support staff and site/side marked as required.   Consent Done?:  Yes (Verbal)  Local anesthesia used?: No       Wound Details:    Location:  Right foot     Type of Debridement:  Excisional       Length (cm):  0.1       Width (cm):  0.1       Depth (cm):  0.1       Percent Debrided (%):  100             Depth of debridement:  Subcutaneous tissue    Tissue debrided:  Dermis, Epidermis and Subcutaneous    Devitalized tissue debrided:  Biofilm, Callus and Necrotic/Eschar    Instruments:  Blade, Curette and Nippers     Bleeding:  Minimal  Hemostasis Achieved: Yes    Method Used:  Pressure  Patient tolerance:  Patient tolerated the procedure well with no immediate complications    The nature of the condition, options for management, as well as " potential risks and complications were discussed in detail with patient. Patient was amenable to my recommendations and left my office fully informed and will follow up as instructed or sooner if necessary.      Wound dressing applied as well as Leticia/collagen dressing.  Continue current treatment plan, follow-up in 1 week.

## 2023-07-05 ENCOUNTER — TELEPHONE (OUTPATIENT)
Dept: PODIATRY | Facility: CLINIC | Age: 58
End: 2023-07-05
Payer: COMMERCIAL

## 2023-07-05 NOTE — TELEPHONE ENCOUNTER
Left  for pt with callback number of [phone] for pt to return call to reschedule missed wound care appt

## 2023-07-12 ENCOUNTER — OFFICE VISIT (OUTPATIENT)
Dept: PODIATRY | Facility: CLINIC | Age: 58
End: 2023-07-12
Payer: COMMERCIAL

## 2023-07-12 VITALS
HEIGHT: 74 IN | HEART RATE: 69 BPM | WEIGHT: 287 LBS | SYSTOLIC BLOOD PRESSURE: 169 MMHG | BODY MASS INDEX: 36.83 KG/M2 | DIASTOLIC BLOOD PRESSURE: 94 MMHG

## 2023-07-12 DIAGNOSIS — M79.671 RIGHT FOOT PAIN: ICD-10-CM

## 2023-07-12 DIAGNOSIS — E11.49 TYPE II DIABETES MELLITUS WITH NEUROLOGICAL MANIFESTATIONS: ICD-10-CM

## 2023-07-12 DIAGNOSIS — L97.512 FOOT ULCER, RIGHT, WITH FAT LAYER EXPOSED: Primary | ICD-10-CM

## 2023-07-12 PROCEDURE — 99999 PR PBB SHADOW E&M-EST. PATIENT-LVL II: CPT | Mod: PBBFAC,,, | Performed by: PODIATRIST

## 2023-07-12 PROCEDURE — 3008F PR BODY MASS INDEX (BMI) DOCUMENTED: ICD-10-PCS | Mod: CPTII,S$GLB,, | Performed by: PODIATRIST

## 2023-07-12 PROCEDURE — 11043 DBRDMT MUSC&/FSCA 1ST 20/<: CPT | Mod: RT,S$GLB,, | Performed by: PODIATRIST

## 2023-07-12 PROCEDURE — 3060F PR POS MICROALBUMINURIA RESULT DOCUMENTED/REVIEW: ICD-10-PCS | Mod: CPTII,S$GLB,, | Performed by: PODIATRIST

## 2023-07-12 PROCEDURE — 3046F HEMOGLOBIN A1C LEVEL >9.0%: CPT | Mod: CPTII,S$GLB,, | Performed by: PODIATRIST

## 2023-07-12 PROCEDURE — 3077F PR MOST RECENT SYSTOLIC BLOOD PRESSURE >= 140 MM HG: ICD-10-PCS | Mod: CPTII,S$GLB,, | Performed by: PODIATRIST

## 2023-07-12 PROCEDURE — 4010F ACE/ARB THERAPY RXD/TAKEN: CPT | Mod: CPTII,S$GLB,, | Performed by: PODIATRIST

## 2023-07-12 PROCEDURE — 3060F POS MICROALBUMINURIA REV: CPT | Mod: CPTII,S$GLB,, | Performed by: PODIATRIST

## 2023-07-12 PROCEDURE — 3080F DIAST BP >= 90 MM HG: CPT | Mod: CPTII,S$GLB,, | Performed by: PODIATRIST

## 2023-07-12 PROCEDURE — 99999 PR PBB SHADOW E&M-EST. PATIENT-LVL II: ICD-10-PCS | Mod: PBBFAC,,, | Performed by: PODIATRIST

## 2023-07-12 PROCEDURE — 3046F PR MOST RECENT HEMOGLOBIN A1C LEVEL > 9.0%: ICD-10-PCS | Mod: CPTII,S$GLB,, | Performed by: PODIATRIST

## 2023-07-12 PROCEDURE — 99213 OFFICE O/P EST LOW 20 MIN: CPT | Mod: 25,S$GLB,, | Performed by: PODIATRIST

## 2023-07-12 PROCEDURE — 3077F SYST BP >= 140 MM HG: CPT | Mod: CPTII,S$GLB,, | Performed by: PODIATRIST

## 2023-07-12 PROCEDURE — 4010F PR ACE/ARB THEARPY RXD/TAKEN: ICD-10-PCS | Mod: CPTII,S$GLB,, | Performed by: PODIATRIST

## 2023-07-12 PROCEDURE — 3066F NEPHROPATHY DOC TX: CPT | Mod: CPTII,S$GLB,, | Performed by: PODIATRIST

## 2023-07-12 PROCEDURE — 99213 PR OFFICE/OUTPT VISIT, EST, LEVL III, 20-29 MIN: ICD-10-PCS | Mod: 25,S$GLB,, | Performed by: PODIATRIST

## 2023-07-12 PROCEDURE — 3066F PR DOCUMENTATION OF TREATMENT FOR NEPHROPATHY: ICD-10-PCS | Mod: CPTII,S$GLB,, | Performed by: PODIATRIST

## 2023-07-12 PROCEDURE — 3008F BODY MASS INDEX DOCD: CPT | Mod: CPTII,S$GLB,, | Performed by: PODIATRIST

## 2023-07-12 PROCEDURE — 3080F PR MOST RECENT DIASTOLIC BLOOD PRESSURE >= 90 MM HG: ICD-10-PCS | Mod: CPTII,S$GLB,, | Performed by: PODIATRIST

## 2023-07-12 PROCEDURE — 11043 PR DEBRIDEMENT, SKIN, SUB-Q TISSUE,MUSCLE,=<20 SQ CM: ICD-10-PCS | Mod: RT,S$GLB,, | Performed by: PODIATRIST

## 2023-07-17 NOTE — PROGRESS NOTES
Subjective:      Patient ID: Billy Rivera is a 58 y.o. male.    Chief Complaint:  Follow-up wound care, right foot.      Billy is a 58 y.o. male who presents to the clinic for evaluation and treatment of high risk feet. Billy has a past medical history of Allergy, CAD (coronary artery disease), native coronary artery (6/25/2013), Cancer, COVID-19 virus detected (9/1/2020), Diabetes mellitus, Diabetes mellitus, type 2, Disorder of kidney and ureter, Heart attack (04/2012), colon cancer, stage I, Hyperlipidemia, Hypertension, Muscular pain, NSTEMI May 2013 - peak troponin 0.22 (5/22/2013), MICHAELA (obstructive sleep apnea), and Retinopathy due to secondary diabetes.  Patient presents for follow-up wound care.  No new complaints.  PCP: BRAD Baker MD    Date Last Seen by PCP: 3/23/2022    Current shoe gear:  Affected Foot: DARCO Shoe right foot     Affected Foot: Tennis shoe left foot    History of Trauma: negative  Sign of Infection: none        Hemoglobin A1C   Date Value Ref Range Status   04/06/2023 9.4 (H) 4.0 - 5.6 % Final     Comment:     ADA Screening Guidelines:  5.7-6.4%  Consistent with prediabetes  >or=6.5%  Consistent with diabetes    High levels of fetal hemoglobin interfere with the HbA1C  assay. Heterozygous hemoglobin variants (HbS, HgC, etc)do  not significantly interfere with this assay.   However, presence of multiple variants may affect accuracy.     11/10/2022 7.1 (H) 4.0 - 5.6 % Final     Comment:     ADA Screening Guidelines:  5.7-6.4%  Consistent with prediabetes  >or=6.5%  Consistent with diabetes    High levels of fetal hemoglobin interfere with the HbA1C  assay. Heterozygous hemoglobin variants (HbS, HgC, etc)do  not significantly interfere with this assay.   However, presence of multiple variants may affect accuracy.     08/09/2022 8.0 (H) 4.0 - 5.6 % Final     Comment:     ADA Screening Guidelines:  5.7-6.4%  Consistent with prediabetes  >or=6.5%  Consistent with  diabetes    High levels of fetal hemoglobin interfere with the HbA1C  assay. Heterozygous hemoglobin variants (HbS, HgC, etc)do  not significantly interfere with this assay.   However, presence of multiple variants may affect accuracy.                 Objective:        Physical Exam  Constitutional:       General: He is not in acute distress.     Appearance: Normal appearance. He is well-developed.   HENT:      Head: Normocephalic and atraumatic.   Cardiovascular:      Pulses:           Dorsalis pedis pulses are 2+ on the right side and 2+ on the left side.        Posterior tibial pulses are 2+ on the right side and 2+ on the left side.      Comments: CFT< 3 secs all toes bilateral foot, skin temp warm to cool bilateral foot, no hair growth bilateral lower extremity, no edema to bilateral lower extremities    Pulmonary:      Effort: Pulmonary effort is normal.   Musculoskeletal:         General: No swelling.      Right foot: Normal range of motion. No deformity.      Left foot: Normal range of motion. No deformity.      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     5/5 muscle strength with DF/PF/Inv/Ev bilateral.    Inspection and palpation of the joints and bones reveal no crepitus or joint effusion. No tenderness upon palpation.  Angle and base of gait are normal.    Feet:      Right foot:      Skin integrity: No skin breakdown or erythema.      Left foot:      Skin integrity: No skin breakdown or erythema.   Skin:     General: Skin is warm and dry.      Capillary Refill: Capillary refill takes 2 to 3 seconds.      Findings: No erythema.      Nails: There is no clubbing.      Comments: Ulceration location:  Ulcerations bilateral 2nd MPJ right foot noted to have an ulceration to the muscle tissue with no sign of infection.  This ulceration measures 0.3 cm in diameter by 0.1 cm in depth, post debridement, somewhat worsened since last visit.   Neurological:      Mental  "Status: He is alert and oriented to person, place, and time.      Sensory: Sensory deficit present.      Motor: No weakness.      Deep Tendon Reflexes:      Reflex Scores:       Patellar reflexes are 2+ on the right side and 2+ on the left side.       Achilles reflexes are 2+ on the right side and 2+ on the left side.     Comments: Diminished/loss of protective sensation all toes bilateral to 10 gram monofilament.   Psychiatric:         Mood and Affect: Mood normal.         Behavior: Behavior normal.         Thought Content: Thought content normal.             Assessment:       Encounter Diagnoses   Name Primary?    Foot ulcer, right, with fat layer exposed Yes    Type II diabetes mellitus with neurological manifestations     Right foot pain                Plan:       Billy was seen today for wound care.    Diagnoses and all orders for this visit:    Foot ulcer, right, with fat layer exposed    Type II diabetes mellitus with neurological manifestations    Right foot pain      Wound Debridement    Performed by: Mingo Melara DPM   Authorized by: Patient    Time out: Immediately prior to procedure a "time out" was called to verify the correct patient, procedure, equipment, support staff and site/side marked as required.   Consent Done?:  Yes (Verbal)  Local anesthesia used?: No       Wound Details:    Location:  Right foot     Type of Debridement:  Excisional       Length (cm):  0.3       Width (cm):  0.3       Depth (cm):  0.1       Percent Debrided (%):  100             Depth of debridement:  Subcutaneous tissue    Tissue debrided:  Dermis, Epidermis and Subcutaneous    Devitalized tissue debrided:  Biofilm, Callus and Necrotic/Eschar    Instruments:  Blade, Curette and Nippers     Bleeding:  Minimal  Hemostasis Achieved: Yes    Method Used:  Pressure  Patient tolerance:  Patient tolerated the procedure well with no immediate complications    The nature of the condition, options for management, as well as " potential risks and complications were discussed in detail with patient. Patient was amenable to my recommendations and left my office fully informed and will follow up as instructed or sooner if necessary.      Wound dressing applied as well as Leticia/collagen and football dressing.  Continue current treatment plan, follow-up in 1 week.

## 2023-07-19 ENCOUNTER — OFFICE VISIT (OUTPATIENT)
Dept: PODIATRY | Facility: CLINIC | Age: 58
End: 2023-07-19
Payer: COMMERCIAL

## 2023-07-19 VITALS
SYSTOLIC BLOOD PRESSURE: 134 MMHG | BODY MASS INDEX: 36.84 KG/M2 | WEIGHT: 287.06 LBS | HEART RATE: 66 BPM | DIASTOLIC BLOOD PRESSURE: 78 MMHG | HEIGHT: 74 IN

## 2023-07-19 DIAGNOSIS — E11.49 TYPE II DIABETES MELLITUS WITH NEUROLOGICAL MANIFESTATIONS: ICD-10-CM

## 2023-07-19 DIAGNOSIS — L97.512 FOOT ULCER, RIGHT, WITH FAT LAYER EXPOSED: Primary | ICD-10-CM

## 2023-07-19 DIAGNOSIS — I25.10 CORONARY ARTERY DISEASE INVOLVING NATIVE CORONARY ARTERY OF NATIVE HEART WITHOUT ANGINA PECTORIS: ICD-10-CM

## 2023-07-19 PROCEDURE — 3075F SYST BP GE 130 - 139MM HG: CPT | Mod: CPTII,S$GLB,, | Performed by: PODIATRIST

## 2023-07-19 PROCEDURE — 3078F DIAST BP <80 MM HG: CPT | Mod: CPTII,S$GLB,, | Performed by: PODIATRIST

## 2023-07-19 PROCEDURE — 99213 PR OFFICE/OUTPT VISIT, EST, LEVL III, 20-29 MIN: ICD-10-PCS | Mod: 25,S$GLB,, | Performed by: PODIATRIST

## 2023-07-19 PROCEDURE — 4010F PR ACE/ARB THEARPY RXD/TAKEN: ICD-10-PCS | Mod: CPTII,S$GLB,, | Performed by: PODIATRIST

## 2023-07-19 PROCEDURE — 3060F PR POS MICROALBUMINURIA RESULT DOCUMENTED/REVIEW: ICD-10-PCS | Mod: CPTII,S$GLB,, | Performed by: PODIATRIST

## 2023-07-19 PROCEDURE — 3008F PR BODY MASS INDEX (BMI) DOCUMENTED: ICD-10-PCS | Mod: CPTII,S$GLB,, | Performed by: PODIATRIST

## 2023-07-19 PROCEDURE — 3046F HEMOGLOBIN A1C LEVEL >9.0%: CPT | Mod: CPTII,S$GLB,, | Performed by: PODIATRIST

## 2023-07-19 PROCEDURE — 99213 OFFICE O/P EST LOW 20 MIN: CPT | Mod: 25,S$GLB,, | Performed by: PODIATRIST

## 2023-07-19 PROCEDURE — 3078F PR MOST RECENT DIASTOLIC BLOOD PRESSURE < 80 MM HG: ICD-10-PCS | Mod: CPTII,S$GLB,, | Performed by: PODIATRIST

## 2023-07-19 PROCEDURE — 3008F BODY MASS INDEX DOCD: CPT | Mod: CPTII,S$GLB,, | Performed by: PODIATRIST

## 2023-07-19 PROCEDURE — 11042 DBRDMT SUBQ TIS 1ST 20SQCM/<: CPT | Mod: S$GLB,,, | Performed by: PODIATRIST

## 2023-07-19 PROCEDURE — 3066F PR DOCUMENTATION OF TREATMENT FOR NEPHROPATHY: ICD-10-PCS | Mod: CPTII,S$GLB,, | Performed by: PODIATRIST

## 2023-07-19 PROCEDURE — 99999 PR PBB SHADOW E&M-EST. PATIENT-LVL IV: ICD-10-PCS | Mod: PBBFAC,,, | Performed by: PODIATRIST

## 2023-07-19 PROCEDURE — 3066F NEPHROPATHY DOC TX: CPT | Mod: CPTII,S$GLB,, | Performed by: PODIATRIST

## 2023-07-19 PROCEDURE — 4010F ACE/ARB THERAPY RXD/TAKEN: CPT | Mod: CPTII,S$GLB,, | Performed by: PODIATRIST

## 2023-07-19 PROCEDURE — 99999 PR PBB SHADOW E&M-EST. PATIENT-LVL IV: CPT | Mod: PBBFAC,,, | Performed by: PODIATRIST

## 2023-07-19 PROCEDURE — 3075F PR MOST RECENT SYSTOLIC BLOOD PRESS GE 130-139MM HG: ICD-10-PCS | Mod: CPTII,S$GLB,, | Performed by: PODIATRIST

## 2023-07-19 PROCEDURE — 11042 PR DEBRIDEMENT, SKIN, SUB-Q TISSUE,=<20 SQ CM: ICD-10-PCS | Mod: S$GLB,,, | Performed by: PODIATRIST

## 2023-07-19 PROCEDURE — 3060F POS MICROALBUMINURIA REV: CPT | Mod: CPTII,S$GLB,, | Performed by: PODIATRIST

## 2023-07-19 PROCEDURE — 3046F PR MOST RECENT HEMOGLOBIN A1C LEVEL > 9.0%: ICD-10-PCS | Mod: CPTII,S$GLB,, | Performed by: PODIATRIST

## 2023-07-20 ENCOUNTER — LAB VISIT (OUTPATIENT)
Dept: LAB | Facility: HOSPITAL | Age: 58
End: 2023-07-20
Payer: COMMERCIAL

## 2023-07-20 ENCOUNTER — OFFICE VISIT (OUTPATIENT)
Dept: INTERNAL MEDICINE | Facility: CLINIC | Age: 58
End: 2023-07-20
Payer: COMMERCIAL

## 2023-07-20 VITALS
RESPIRATION RATE: 17 BRPM | BODY MASS INDEX: 36.44 KG/M2 | WEIGHT: 283.94 LBS | HEIGHT: 74 IN | OXYGEN SATURATION: 97 % | DIASTOLIC BLOOD PRESSURE: 82 MMHG | TEMPERATURE: 97 F | HEART RATE: 59 BPM | SYSTOLIC BLOOD PRESSURE: 140 MMHG

## 2023-07-20 DIAGNOSIS — E11.3299 BDR (BACKGROUND DIABETIC RETINOPATHY): ICD-10-CM

## 2023-07-20 DIAGNOSIS — I15.2 OBESITY, DIABETES, AND HYPERTENSION SYNDROME: ICD-10-CM

## 2023-07-20 DIAGNOSIS — S98.132A AMPUTATION OF TOE OF LEFT FOOT: ICD-10-CM

## 2023-07-20 DIAGNOSIS — E78.5 DYSLIPIDEMIA ASSOCIATED WITH TYPE 2 DIABETES MELLITUS: ICD-10-CM

## 2023-07-20 DIAGNOSIS — E11.3393 TYPE 2 DIABETES MELLITUS WITH BOTH EYES AFFECTED BY MODERATE NONPROLIFERATIVE RETINOPATHY WITHOUT MACULAR EDEMA, WITH LONG-TERM CURRENT USE OF INSULIN: ICD-10-CM

## 2023-07-20 DIAGNOSIS — E11.69 ONYCHOMYCOSIS OF MULTIPLE TOENAILS WITH TYPE 2 DIABETES MELLITUS: ICD-10-CM

## 2023-07-20 DIAGNOSIS — E11.59 HYPERTENSION ASSOCIATED WITH DIABETES: Chronic | ICD-10-CM

## 2023-07-20 DIAGNOSIS — Z79.4 TYPE 2 DIABETES MELLITUS WITH DIABETIC PERIPHERAL ANGIOPATHY WITHOUT GANGRENE, WITH LONG-TERM CURRENT USE OF INSULIN: Primary | Chronic | ICD-10-CM

## 2023-07-20 DIAGNOSIS — Z89.421 ACQUIRED ABSENCE OF OTHER RIGHT TOE(S): ICD-10-CM

## 2023-07-20 DIAGNOSIS — Z79.4 TYPE 2 DIABETES MELLITUS WITH DIABETIC PERIPHERAL ANGIOPATHY WITHOUT GANGRENE, WITH LONG-TERM CURRENT USE OF INSULIN: Chronic | ICD-10-CM

## 2023-07-20 DIAGNOSIS — Z79.4 TYPE 2 DIABETES MELLITUS WITH BOTH EYES AFFECTED BY MODERATE NONPROLIFERATIVE RETINOPATHY WITHOUT MACULAR EDEMA, WITH LONG-TERM CURRENT USE OF INSULIN: ICD-10-CM

## 2023-07-20 DIAGNOSIS — E11.69 DYSLIPIDEMIA ASSOCIATED WITH TYPE 2 DIABETES MELLITUS: ICD-10-CM

## 2023-07-20 DIAGNOSIS — E66.9 OBESITY, DIABETES, AND HYPERTENSION SYNDROME: ICD-10-CM

## 2023-07-20 DIAGNOSIS — Z96.41 INSULIN PUMP IN PLACE: ICD-10-CM

## 2023-07-20 DIAGNOSIS — I15.2 HYPERTENSION ASSOCIATED WITH DIABETES: Chronic | ICD-10-CM

## 2023-07-20 DIAGNOSIS — E11.51 TYPE 2 DIABETES MELLITUS WITH DIABETIC PERIPHERAL ANGIOPATHY WITHOUT GANGRENE, WITH LONG-TERM CURRENT USE OF INSULIN: Primary | Chronic | ICD-10-CM

## 2023-07-20 DIAGNOSIS — E66.01 CLASS 2 SEVERE OBESITY WITH BODY MASS INDEX (BMI) OF 35 TO 39.9 WITH SERIOUS COMORBIDITY: ICD-10-CM

## 2023-07-20 DIAGNOSIS — I25.10 CORONARY ARTERY DISEASE INVOLVING NATIVE CORONARY ARTERY OF NATIVE HEART WITHOUT ANGINA PECTORIS: ICD-10-CM

## 2023-07-20 DIAGNOSIS — E11.51 TYPE 2 DIABETES MELLITUS WITH DIABETIC PERIPHERAL ANGIOPATHY WITHOUT GANGRENE, WITH LONG-TERM CURRENT USE OF INSULIN: Chronic | ICD-10-CM

## 2023-07-20 DIAGNOSIS — E11.69 OBESITY, DIABETES, AND HYPERTENSION SYNDROME: ICD-10-CM

## 2023-07-20 DIAGNOSIS — B35.1 ONYCHOMYCOSIS OF MULTIPLE TOENAILS WITH TYPE 2 DIABETES MELLITUS: ICD-10-CM

## 2023-07-20 DIAGNOSIS — E11.59 OBESITY, DIABETES, AND HYPERTENSION SYNDROME: ICD-10-CM

## 2023-07-20 LAB
ALBUMIN SERPL BCP-MCNC: 3.7 G/DL (ref 3.5–5.2)
ALP SERPL-CCNC: 104 U/L (ref 55–135)
ALT SERPL W/O P-5'-P-CCNC: 44 U/L (ref 10–44)
ANION GAP SERPL CALC-SCNC: 10 MMOL/L (ref 8–16)
AST SERPL-CCNC: 33 U/L (ref 10–40)
BILIRUB SERPL-MCNC: 0.8 MG/DL (ref 0.1–1)
BUN SERPL-MCNC: 19 MG/DL (ref 6–20)
CALCIUM SERPL-MCNC: 9.4 MG/DL (ref 8.7–10.5)
CHLORIDE SERPL-SCNC: 104 MMOL/L (ref 95–110)
CHOLEST SERPL-MCNC: 135 MG/DL (ref 120–199)
CHOLEST/HDLC SERPL: 3.5 {RATIO} (ref 2–5)
CO2 SERPL-SCNC: 25 MMOL/L (ref 23–29)
CREAT SERPL-MCNC: 0.9 MG/DL (ref 0.5–1.4)
EST. GFR  (NO RACE VARIABLE): >60 ML/MIN/1.73 M^2
ESTIMATED AVG GLUCOSE: 203 MG/DL (ref 68–131)
GLUCOSE SERPL-MCNC: 87 MG/DL (ref 70–110)
GLUCOSE SERPL-MCNC: 91 MG/DL (ref 70–110)
HBA1C MFR BLD: 8.7 % (ref 4–5.6)
HDLC SERPL-MCNC: 39 MG/DL (ref 40–75)
HDLC SERPL: 28.9 % (ref 20–50)
LDLC SERPL CALC-MCNC: 71 MG/DL (ref 63–159)
NONHDLC SERPL-MCNC: 96 MG/DL
POTASSIUM SERPL-SCNC: 4.5 MMOL/L (ref 3.5–5.1)
PROT SERPL-MCNC: 7.7 G/DL (ref 6–8.4)
SODIUM SERPL-SCNC: 139 MMOL/L (ref 136–145)
TRIGL SERPL-MCNC: 125 MG/DL (ref 30–150)

## 2023-07-20 PROCEDURE — 80053 COMPREHEN METABOLIC PANEL: CPT | Performed by: NURSE PRACTITIONER

## 2023-07-20 PROCEDURE — 3079F PR MOST RECENT DIASTOLIC BLOOD PRESSURE 80-89 MM HG: ICD-10-PCS | Mod: CPTII,S$GLB,, | Performed by: NURSE PRACTITIONER

## 2023-07-20 PROCEDURE — 99214 PR OFFICE/OUTPT VISIT, EST, LEVL IV, 30-39 MIN: ICD-10-PCS | Mod: S$GLB,,, | Performed by: NURSE PRACTITIONER

## 2023-07-20 PROCEDURE — 36415 COLL VENOUS BLD VENIPUNCTURE: CPT | Mod: PO | Performed by: NURSE PRACTITIONER

## 2023-07-20 PROCEDURE — 3052F PR MOST RECENT HEMOGLOBIN A1C LEVEL 8.0 - < 9.0%: ICD-10-PCS | Mod: CPTII,S$GLB,, | Performed by: NURSE PRACTITIONER

## 2023-07-20 PROCEDURE — 82962 POCT GLUCOSE, HAND-HELD DEVICE: ICD-10-PCS | Mod: S$GLB,,, | Performed by: NURSE PRACTITIONER

## 2023-07-20 PROCEDURE — 3066F NEPHROPATHY DOC TX: CPT | Mod: CPTII,S$GLB,, | Performed by: NURSE PRACTITIONER

## 2023-07-20 PROCEDURE — 3066F PR DOCUMENTATION OF TREATMENT FOR NEPHROPATHY: ICD-10-PCS | Mod: CPTII,S$GLB,, | Performed by: NURSE PRACTITIONER

## 2023-07-20 PROCEDURE — 3077F PR MOST RECENT SYSTOLIC BLOOD PRESSURE >= 140 MM HG: ICD-10-PCS | Mod: CPTII,S$GLB,, | Performed by: NURSE PRACTITIONER

## 2023-07-20 PROCEDURE — 4010F PR ACE/ARB THEARPY RXD/TAKEN: ICD-10-PCS | Mod: CPTII,S$GLB,, | Performed by: NURSE PRACTITIONER

## 2023-07-20 PROCEDURE — 3077F SYST BP >= 140 MM HG: CPT | Mod: CPTII,S$GLB,, | Performed by: NURSE PRACTITIONER

## 2023-07-20 PROCEDURE — 83036 HEMOGLOBIN GLYCOSYLATED A1C: CPT | Performed by: NURSE PRACTITIONER

## 2023-07-20 PROCEDURE — 3052F HG A1C>EQUAL 8.0%<EQUAL 9.0%: CPT | Mod: CPTII,S$GLB,, | Performed by: NURSE PRACTITIONER

## 2023-07-20 PROCEDURE — 99999 PR PBB SHADOW E&M-EST. PATIENT-LVL V: ICD-10-PCS | Mod: PBBFAC,,, | Performed by: NURSE PRACTITIONER

## 2023-07-20 PROCEDURE — 4010F ACE/ARB THERAPY RXD/TAKEN: CPT | Mod: CPTII,S$GLB,, | Performed by: NURSE PRACTITIONER

## 2023-07-20 PROCEDURE — 3008F PR BODY MASS INDEX (BMI) DOCUMENTED: ICD-10-PCS | Mod: CPTII,S$GLB,, | Performed by: NURSE PRACTITIONER

## 2023-07-20 PROCEDURE — 3079F DIAST BP 80-89 MM HG: CPT | Mod: CPTII,S$GLB,, | Performed by: NURSE PRACTITIONER

## 2023-07-20 PROCEDURE — 3060F POS MICROALBUMINURIA REV: CPT | Mod: CPTII,S$GLB,, | Performed by: NURSE PRACTITIONER

## 2023-07-20 PROCEDURE — 99214 OFFICE O/P EST MOD 30 MIN: CPT | Mod: S$GLB,,, | Performed by: NURSE PRACTITIONER

## 2023-07-20 PROCEDURE — 80061 LIPID PANEL: CPT | Performed by: NURSE PRACTITIONER

## 2023-07-20 PROCEDURE — 99999 PR PBB SHADOW E&M-EST. PATIENT-LVL V: CPT | Mod: PBBFAC,,, | Performed by: NURSE PRACTITIONER

## 2023-07-20 PROCEDURE — 82962 GLUCOSE BLOOD TEST: CPT | Mod: S$GLB,,, | Performed by: NURSE PRACTITIONER

## 2023-07-20 PROCEDURE — 3008F BODY MASS INDEX DOCD: CPT | Mod: CPTII,S$GLB,, | Performed by: NURSE PRACTITIONER

## 2023-07-20 PROCEDURE — 3060F PR POS MICROALBUMINURIA RESULT DOCUMENTED/REVIEW: ICD-10-PCS | Mod: CPTII,S$GLB,, | Performed by: NURSE PRACTITIONER

## 2023-07-20 NOTE — PROGRESS NOTES
58 y.o. male here for 2 month follow up for management of T2dm -   Last seen May 2023 - those notes are below.     A1c last at 9.4% (but this was off of glp1 therapy as he couldn't afford it).   He remains on 770 medtronic insulin pump -   Also on metformin 1000 mg twice per day.   He was able to get a sample pen from a friend for ozempic so he is back taking that weekly, and does so well on that -     Taking 0.25mg every week but will run out in about 1 month.   He cannot afford ozempic pen as his insurance deductible is high.   We've tried for patient assistance, but he doesn't qualify due to having commercial healthcare insurance.     I had tried to restart farxiga 5mg tablet for him -   and sent him a florentino 3 at last visit -   but he is not using either b/c of cost.      He is using less insulin on the ozempic for sure -   Pump downloaded - using U100 Novolin R from [employer] - this is cheapest for him -   TDD 63.4 units day on average  62% is coming from basal rate.   38% bolusing - using preset boluses 10 - 15 units often - eating 3 meals per day -   Active insulin time is 3 hours -   He is checking his blood sugars with fingerstick -   Bg's running 120 - 150's on average.     Basal rate 1.75 units/hour - midnight to 7am -   7am - 1.6 units/hour -   Carb ratio 1: 6   ISF 25   Bg target 110 - 120.   Preset boluses though - using 5 units for snacks.   10 unit or 15 units for small or medium meals.         Last visit notes as follows from May 2023:   57 y.o. male here for routine 2 month follow up for T2dm management   Last seen march 2023 - those notes below.     A1c heading back up - a1c now up to 9.4% -   He cannot afford glp1 or sglt2i - he cannot qualify for patient assistance.   He;'s done both trulicity and farxiga last year with EXCELLENT results. We finally got his a1c to goal of 7.1% and were able to decrease his insulin dosing...     Has new insurance plan now - [employer] through wife's work -   So he  wants to try and get some of  his medications he needs now.   Continues on metformin 1000 bid.   Also on medtronic 770 insulin pump - using humulin R U100 from [employer] since his insurance wants to charge him high amounts for vials of novolog.     Downloaded - and reviewed -   Total daily dose is 81.5 units/day -   48% is coming from basal rate.   52% is coming from bolus -   Changes set every 4 days.   Active insulin time is 4 hours.   00:0 1.75 units/hour.   07:00 1.6 units/hour  Carb ratio 1: 6   ISF set at 25 -   Bg target 110 - 120   He has preset boluses 5 units for a snack, 15 units for a meal.     No cgm.   Not checking bg's sugars regularly -       Last visit notes as follows from March 2023:   57 y.o. male here for routine follow up for T2dm -   Last seen in august 2022 - those notes are below.     His a1c came down finally from 10% to 7.1% - he did great.   However he is having issues with cost of his medications still - he is a  and his wife works at [employer].   Has insurance through his wife's work.     No new a1c for today, but in January was at 7.1% - so he was so so happy.   He continues on Metformin 1000mg bid   Trulicity 1.5mg sc weekly was working great - but he stopped it taking it January 2023 - b/c he can't afford.   B/c his deductible is $5000 so he can't pay for it.   Was on farxiga 5 mg tablet daily - worked great his a1c went down - but now he cannot afford it b/c it's $300 b/c of his deductible.  History of toe amputation --  Medtronic 770 insulin pump - manual mode  Basal rate at 1.6 units/hour - he is of note using Novolin R - gets vials from [facility] - in his pump - b/c he couldn't afford the other insulin.     Total daily dose 80.1 units/day -   48% coming from basal rate.   52% is coming from bolusing.   Vhanges every 3.7 days on average.   Active insulin time is 4 hours.   Bg target 110 - 120   ISF is at 25   Carb ratio is at 6   However he uses presets -   5 untis for  "snack.   15 units for meals  10 units for smaller meal.      He is checking his sugars with relion glucometer - [employer]- fingersticks -   His insurance would never cover a cgm -   He is checking daily - it's around 200 - 260's on average.   It gets higher in the evenings almost always -   He works as a  overnight so he's running high glucoses overnight essentially while eating.   He has no logs with him to show.   (Before when he was on trulicity and farxiga - it was 110 - 140s on average and he loved this).       Last visit notes from august 2022 as follows  57 y.o. male here for 5 month followup for management of t2dm -   Last seen march 2022 - those notes below.     a1c is decreased from 12.2% to 10% -   However he reports his glucoses are in the 90 - 150's.   He checks his sugars 1 - 2 times per day.   His blood sugar is level of 99 and this is fasting -   Admits he is not eating "as much" - smaller portions.   Is trying to limit his carbs, but admits eating what he can "get" -   We have tried to get him on cgm therapy for the past 1 - 2 years, but never can get approved/can't afford.   He prefers to fingerstick.   He denies any known hypoglycemic episodes.   States bg's vary over the past month from 90 - 211 at the highest.     Is on insulin continuously via pump   Continues on metformin 1000 mg twice per day -   Also on trulicity 1.5mg SC every week -    Denies any side effects from it - he is having normal bm's, denies nausea/vomiting or abdominal pain.   No constipation -   He was estimated to have a high cost, however so stopped taking the trulicity for a couple months   Now the cost has gone down/He is actually getting from the pharmacy - and gets it for free from [facility].     Continues on "[employer] 770 pump - downloaded and reviewed today -   See scanned in .   Using humalog in insulin pump -     In manual mode -   Total daily dose - 70.8 units/day -   64% is " coming from basal rate.   36% is coming from bolusing -   Active insulin time is 4 hours.   He is giving/use presets - 10 or 15 units. Has a snack dose set for 5 units.    Basal rate is 1.9 units/hour -   Carb ratio of 1: 6 - however he is not carb counting   isf set at 25 -   bg target 110 - 120 -       Last OV from march 2022 as follows:   56 y.o. male here for 1 month follow up for management of T2dm -   Last seen 2/23/2022 - those notes are below.     a1c last @ 12.2%, however his bg's since last visit are coming down tremendously.   I began him on trulicity 0.75mg SC weekly at his last visit - he gives every Thursday.   States his glucoses have come down into the 150's on average.   I submitted for the dexcom G6 - but it was going to be $900+ dollars through DME - so he didn't get/use.   He did try a sample florentino 2 - he liked using it - however didn't bring the reader with him today -   He would like a cgm going forward if possible.   For now, continues to fingerstick.     He continues on metformin 1000 bid.   trulicity 0.75 weekly.   Also on insulin pump and I adjusted settings on his pump at last visit.     medtronic 770 -   He remains in manual mode -   Total daily dose of insulin is 77.3 units/day.   59% is coming from the basal rate.   41% is coming from the bolus.   chaning his set every 3 days.   Active insulin time is at 4 hours.   Giving preset boluses -  5 units - snack.   10 units - regular meal.   15 units - medium meal.   He eats about 2 meals per day on average and snacks.   Sometimes bolusing just 2 times per day.     Settings:   Basal rate is at 1.9 units/hour.   Carb ratio is at 1: 56 -   ISF is at 1: 25   bg target is at 110 - 120.       He also has back up insulin - lantus for once per day.   Asking for refills for his bp and cholesterol meds.   Right foot ulcer - continues to heal.   Follows with podiatry - just started on cipro twice per day.       Last visit notes from 2/23/2022  56 y.o.  gentleman, here for 2 week follow up for management of T2dm -   Last seen 2/9/2022 - those notes are below.     Historically uncontrolled - last a1c is @ 12.2%.   Had ulcers on foot, followed by staph infection, and then sustained a toe amputation.   On metformin 1000 bid.   I prescribed him farxiga - 5mg tablet daily - but he didn't start as the cost was too high.     Continues on medtronic 770 insulin pump - I had increased his rates at last visit -   He had been on novolin R in his pump as this was cheaper he found at [facility]. I switched him to humalog - gave voucher for $35 through [employer] - however stated it was still too expensive.   As his sugars running continuously high - we made adjustments to his pump. He feels this has helped somewhat.     Pump downloaded today -   Reviewed - see scanned in  -   100% in manual mode   TDD is 59.4 units/day   68% coming from basal rate.   32% coming from bolusing.   He does not carb count.   Uses preset boluses 5 units for a snack, 10 units for meal time boluses.   He boluses anywhere from 1 - 2 times per day on average in looking at his report.     Settings:   Active insulin time is 4 hours.   Basal rate is at 1.8 units/hour.   Carb ratio is 1: 6   ISF is at 1: 25   bg target is 110 - 125     He does not use cgm - states has high deductible.   Had tried to order him a guardian cgm in the past - to be closed loop with his pump, but he never did purchase.   He had a florentino Pro placed in office in December 2020 but did not continue on or use a personal.   He checks his glucose by fingerstick.   No logs with him today.   His sugar today in clinic is at 355. I showed him how to give a correction bolus on his pump and we walked through to use the bolus wizard together.   I had ordered him a dexcom at last visit however he never got b/c the cost was too high.   (it was $700 so he didn't  ).         Last visit notes from 2/9/2022 as follows:   56 y.o. male here  "for 1.5 year follow up -   Last seen 12/2020 - those notes are below.   That was our initial consult, and then he followed with diabetes education - has been lost to follow since.     a1c now up from 10.5% ---> 12.2%.   Historically uncontrolled glucoses and diabetes -   States "I feel great!"   However admits "feels tired just when he works long hours/shifts" - states he is working almost 7 days per week.   Lives at home with his wife.   Reports staph infection in his left foot worsened, - had to amputate his large toe on his left toe and 2nd bone.   States this occurred in July 2021 -     Current meds:   Metformin 100 bid.   Had prescribed him jardiance - but he never took  - states the cost had been too much.   He was on MDI - and switched him to pump therapy -   (formerly on lantus 60 every HS and novolin R 25 units tid ac meals).     He began on medtronic pump shortly after I saw him - but then never followed up.   He is on medtronic 770 pump - his insurance has high cost for humalog or novolog.   He is on novolin R in his insulin pump. Found cheaper at [facility] by the vial.   He often does not bolus with meals - only 1 - 2 times per week -   He eats however 5 times per day - 3 meals and 2 snacks -   Has never carb counted before - but admits he usually just "estimates" how much insulin he needs.   (usually just 6 - 7 units with meals).     medtronic pump downloaded and reviewed today -   Insulin rate is at 1.5 units/hour -   Carb ratio is at 1: 6 - but he does not input carbs.   ISF 25 -   bg target 110 - 120   Manual mode is 100%   Average bg is 308 -   Total daily dose is 40 units   85% is coming from basal rate.   15% is coming from bolusing.   Active insulin time is set for 4 hours.     Checking sugars with a glucometer - Relion meter - doesn't have his meter with him.   Has never used cgm  Therapy.   I had ordered guardian for him to go with his pump - but never was covered or cost was too much for him. "   Last checked glucose yesterday.   Had small piece of lasagna for lunch - states gave 6 units of insulin   No acute changes in physical complaints.   Has seen eye doctor - states no acute changes in vision - still wears glasses -       Last visit notes from 12/2020:   HPI: Billy Rivera is a 58 y.o.  male c/I for visit to address Diabetes Type 2  This is the first time I am seeing him for care, follows with DRE Hunt MD for primary care needs.   Recently has switched to seeing dr. Foreman for primary care needs.   Has not seen endocrinology or diabetes specialist in the past.     He had recent covid 19 infection, but this has since resolved.   He was diagnosed with T2DM about 20 years ago  Family history - both sides strong history.   Has never been hospitalized r/t DM.  Denies missing doses of DM medication.     Current a1c @ 10.5%. Historically uncontrolled in past.   Current meds -   Metformin 1000 bid.   Began insulin shortly after his diagnosis.   lantus 55 units every night.   novolin R ac meals - 25 units tid.  (previously on novolog but insurance stopped covering).   Tried trulicity in his past, but caused severe constipation. Does not really want to try drug class again for that reason.   Has never tried SGLT2i's.     He is feeling well, does not feel when his sugars are high or low.  Does not titrate his insulin based on glucose readings. Generally always gives the full 25 units as his sugars just remain high regardless  Of what he eats or what he does.   He admits not always following a diabetic diet, finds it hard to eliminate carbs or limit them.     Complications from diabetes -   +Retinopathy.   + CV disease   + history of MI, Nstemi in May 2013. Got stent placed.   -nephropathy.   Large foot ulcer on left foot - receiving IV antibiotics via right upper arm picc line.   In boot with wound vac. Following with podiatry.        Past medical History:   Past Medical History:   Diagnosis  Date    Allergy     CAD (coronary artery disease), native coronary artery 6/25/2013    Cancer     COVID-19 virus detected 9/1/2020    Diabetes mellitus     Diabetes mellitus, type 2     Disorder of kidney and ureter     Heart attack 04/2012    Hx of colon cancer, stage I     Hyperlipidemia     Hypertension     Muscular pain     post-op after colonoscopy    NSTEMI May 2013 - peak troponin 0.22 5/22/2013    MICHAELA (obstructive sleep apnea)     Retinopathy due to secondary diabetes       Family hx:   Family History   Problem Relation Age of Onset    Heart disease Mother     Diabetes Mother     Diabetes Father     Heart disease Brother     Diabetes Maternal Grandmother     Diabetes Maternal Grandfather     Diabetes Paternal Grandmother     Diabetes Paternal Grandfather     Anesthesia problems Neg Hx       Current meds:   Current Outpatient Medications:     acetaminophen (TYLENOL) 500 MG tablet, Take 1,000 mg by mouth daily as needed for Pain., Disp: , Rfl:     ascorbic acid, vitamin C, (VITAMIN C) 250 MG tablet, Take 500 mg by mouth once daily., Disp: , Rfl:     aspirin (ECOTRIN) 81 MG EC tablet, Take 1 tablet (81 mg total) by mouth once daily., Disp: , Rfl: 0    atorvastatin (LIPITOR) 80 MG tablet, Take 1 tablet (80 mg total) by mouth once daily. Take every night., Disp: 30 tablet, Rfl: 11    bismuth subsalicylate (PEPTO BISMOL) 262 mg/15 mL suspension, Take 15 mLs by mouth daily as needed (diarrhea)., Disp: , Rfl:     blood sugar diagnostic (ACCU-CHEK GUIDE TEST STRIPS) Strp, 1 each by Misc.(Non-Drug; Combo Route) route 5 (five) times daily., Disp: 150 each, Rfl: 11    carvediloL (COREG) 12.5 MG tablet, Take 0.5 tablets (6.25 mg total) by mouth 2 (two) times daily with meals., Disp: 90 tablet, Rfl: 3    doxycycline monohydrate 100 mg Tab, Take 1 tablet (100 mg total) by mouth 2 (two) times daily., Disp: 20 tablet, Rfl: 0    insulin (LANTUS SOLOSTAR U-100 INSULIN) glargine 100 units/mL SubQ pen, Inject 50 Units into the  "skin once daily. USE AS BACK UP INSULIN ONLY - EMERGENCY USE IF YOUR ARE OFF OF YOUR INSULIN PUMP, Disp: 15 mL, Rfl: 1    insulin aspart, niacinamide, (FIASP U-100 INSULIN) 100 unit/mL Soln, Inject 100 Units into the skin continuous. Gives via insulin pump up to 100 units daily. Do not directly inject. Uses as directed. Rate is 1.75 units/hour - carb counts as well for meal time boluses., Disp: 60 mL, Rfl: 6    insulin lispro (HUMALOG U-100 INSULIN) 100 unit/mL injection, Inject 15 Units into the skin 3 (three) times daily before meals. Gives via insulin pump only. Do not Directly inject., Disp: , Rfl:     lancets (ACCU-CHEK FASTCLIX LANCET DRUM) Misc, 1 each by Misc.(Non-Drug; Combo Route) route Daily., Disp: 30 each, Rfl: 11    lisinopriL (PRINIVIL,ZESTRIL) 40 MG tablet, Take 1 tablet (40 mg total) by mouth once daily., Disp: 90 tablet, Rfl: 3    metFORMIN (GLUCOPHAGE) 1000 MG tablet, Take 1 tablet (1,000 mg total) by mouth 2 (two) times daily with meals., Disp: 180 tablet, Rfl: 3    multivit-min/folic/vit K/lycop (MEN'S MULTIVITAMIN ORAL), Take 1 tablet by mouth once daily., Disp: , Rfl:     pen needle, diabetic 31 gauge x 3/16" Ndle, Inject 1 each into the skin 4 (four) times daily., Disp: , Rfl:     semaglutide (OZEMPIC) 0.25 mg or 0.5 mg (2 mg/3 mL) pen injector, Inject 0.5 mg into the skin every 7 days., Disp: 3 mL, Rfl: 6    amLODIPine (NORVASC) 10 MG tablet, Take 1 tablet (10 mg total) by mouth once daily., Disp: 90 tablet, Rfl: 3    MINIMED 770G INSULIN PUMP Misc, 1 each by Misc.(Non-Drug; Combo Route) route continuous. Medically necessary for management of Type 2 diabetes, E11.65, Disp: 1 each, Rfl: 0     Current Diabetes medications:   novolin r via insulin pump (purchases from [employer]) --  Now has changed to humalog and got $35/month with voucher.   Uses in medtronic 770 pump   Metformin 1000 mg po bid with food  Of note - he has lantus pen - 50 untis once per day for back up insulin     Medications " Tried and Failed:   trulicity - severe constipation   novolog - insurance wouldn't cover so switched to novolin r.  Long acting insulin: lantus 55 units every night.   Short acting insulin:  novolin r 25 units tid ac meals - takes 5 - 15 minutes prior to meals.   jardiance - was prescribed, but he never took as the cost was too much.   farxiga - was prescribed   Trulicity 1.5mg sc every week - if off right now - b/c he can't afford  Farxiga 5 - is off - b/c he can't afford.     Review of Pertinent co-morbidities/risk factors:   CV: + history of MI   CAD: yes nstemi with stent.   Now history of left foot toe amputation.   Takes aspirin 81mg tablet daily  BP: has history of HTN  Statin: Taking  ACE/ARB: Taking    Social History     Tobacco Use   Smoking Status Never    Passive exposure: Never   Smokeless Tobacco Never      Social:   Lives at home with: wife.   Life changes/stressors currently: has ulcer to left foot - so off work for now while this heals.   Diet:  Not always following ADA diet   Meals: 3 per day and snacks.        Breakfast - cornflakes with milk, eggs, oatmeal       Lunch - precooked meals from [employer]. (meat/veggie/small carb).        Dinner - hot plate meals from restaurants - meat/veggie/starch. Chicken and vegetables, beef, occasionally rice.   Spaghetti and meatballs.        Snacks - doritos bags, cheetos, kind bar reduced sugar.        Drinks - diet tea, milk, diet lemonade, diet coke, water.   Exercise: nothing formal, but normally walking a lot in his work.   Activities: was working full time as  at [employer]. No longer working.     Glucose Monitoring:   Checking sugars 4x/day by fingerstick.  Has relion meter.    CGM - has never used, Free style florentino and dexcom are both excluded from his insurance plan in the past -   Had prescribed guardian cgm - but never was covered.     Standards of care:   Eyes: .: 08/15/2019  Foot exam: : 05/11/2023   Diabetes education: yes - on start of  "insulin pump therapy.     Vital Signs  BP (!) 140/82 (BP Location: Right arm, Patient Position: Sitting, BP Method: Large (Manual))   Pulse (!) 59   Temp 97.2 °F (36.2 °C) (Temporal)   Resp 17   Ht 6' 2" (1.88 m)   Wt 128.8 kg (283 lb 15.2 oz)   SpO2 97%   BMI 36.46 kg/m²     Pertinent Labs:   Hgba1c   Lab Results   Component Value Date    HGBA1C 8.7 (H) 07/20/2023    HGBA1C 9.4 (H) 04/06/2023    HGBA1C 7.1 (H) 11/10/2022     Lipid panel   Lab Results   Component Value Date    CHOL 135 07/20/2023    CHOL 151 04/06/2023    CHOL 121 11/10/2022     Lab Results   Component Value Date    HDL 39 (L) 07/20/2023    HDL 35 (L) 04/06/2023    HDL 34 (L) 11/10/2022     Lab Results   Component Value Date    LDLCALC 71.0 07/20/2023    LDLCALC 89.2 04/06/2023    LDLCALC 57.4 (L) 11/10/2022     Lab Results   Component Value Date    TRIG 125 07/20/2023    TRIG 134 04/06/2023    TRIG 148 11/10/2022     Lab Results   Component Value Date    CHOLHDL 28.9 07/20/2023    CHOLHDL 23.2 04/06/2023    CHOLHDL 28.1 11/10/2022      CMP  Glucose   Date Value Ref Range Status   07/20/2023 91 70 - 110 mg/dL Final     BUN   Date Value Ref Range Status   07/20/2023 19 6 - 20 mg/dL Final     Creatinine   Date Value Ref Range Status   07/20/2023 0.9 0.5 - 1.4 mg/dL Final     eGFR if    Date Value Ref Range Status   06/10/2022 >60.0 >60 mL/min/1.73 m^2 Final     eGFR if non    Date Value Ref Range Status   06/10/2022 >60.0 >60 mL/min/1.73 m^2 Final     Comment:     Calculation used to obtain the estimated glomerular filtration  rate (eGFR) is the CKD-EPI equation.        AST   Date Value Ref Range Status   07/20/2023 33 10 - 40 U/L Final     ALT   Date Value Ref Range Status   07/20/2023 44 10 - 44 U/L Final     Microalbumin creatinine ratio:   Lab Results   Component Value Date    MICALBCREAT 169.6 (H) 07/20/2023       Review Of Systems:   Gen: Appetite good, no weight loss or gain,  No fatigue. Denies " polydipsia.  Skin: Skin is intact and heals well, denies any rashes or hair changes.   EENT: Denies any acute visual disturbances, nor blurred vision.   Resp: Denies SOB or Dyspnea on exertion, denies cough.   Cardiac: Denies chest pain, palpitations, or swelling.   GI: Denies abdominal pain, nausea or vomiting, diarrhea, or constipation.   /GYN: + nocturia several times per night, however denies burning, frequency or pain on urination.  MS/Neuro: Denies numbness/ tingling in BLE; unsteady Gait, uses boot.   speech clear  Psych: Denies drug/ETOH abuse, no hx of depression.  Other systems: negative.    Physical Exam:   GENERAL: Well developed, well nourished in appearance.   PSYCH: AAOx3, appropriate mood and affect, pleasant expression, conversant, appears relaxed, well groomed.   EYES: PERRL, Conjunctiva and corneas clear  NECK: Soft and Supple, trachea midline  CHEST: Even, regular, and unlabored respirations  ABDOMEN: Soft, non-tender, non-distended. Normal bowel sounds.   VASCULAR: pedal pulses palpable bilaterally, no edema.  NEURO:  cranial nerves II - XII intact   MUSCULOSKELETAL: Good ROM,  unsteady gait. Due to boot on right foot.   SKIN: Skin warm, dry, and intact   FEET: right foot in boot - amputation on right foot/large toe.     Assessment and Plan of Care:     Billy was seen today for follow-up.    Diagnoses and all orders for this visit:    Type 2 diabetes mellitus with diabetic peripheral angiopathy without gangrene, with long-term current use of insulin  -     POCT Glucose, Hand-Held Device  -     Hemoglobin A1C; Future  -     Lipid Panel; Future  -     Microalbumin/Creatinine Ratio, Urine; Future  -     Comprehensive Metabolic Panel; Future  -     Hemoglobin A1C; Future  -     Lipid Panel; Future  -     Microalbumin/Creatinine Ratio, Urine; Future  -     Comprehensive Metabolic Panel; Future    Hypertension associated with diabetes    Coronary artery disease involving native coronary artery of  native heart without angina pectoris    Type 2 diabetes mellitus 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, with long-term current use of insulin    Obesity, diabetes, and hypertension syndrome    Insulin pump in place    Dyslipidemia associated with type 2 diabetes mellitus    Onychomycosis of multiple toenails with type 2 diabetes mellitus    BDR (background diabetic retinopathy) - Both Eyes    Acquired absence of other right toe(s)    Class 2 severe obesity with body mass index (BMI) of 35 to 39.9 with serious comorbidity    Amputation of toe of left foot           1. T2DM with hyperglycemia- Hgba1c goal is 7.5% or less without hypoglycemia - 11.5%--> 10.8%--> 10.5%--> 12.2% -->10%  -->8%--> 7.1% -- going back up - now at 9.4% (off of glp1's).   discussed DM, progression of disease, long term complications, CV risk factors and tx options.   Has already had MI in past.   Continue/restart glp1 - used to do trulicity - 1.5mg every week.   Sent in for ozempc 0.5mg every week - and asking for patient assistance. He did not qualifiy. He has new insurance so jsut resent. He borrows Cybits, gets from friends when he can -   Continue metformin 1000 bid.   restart farxiga 5mg tablet daily - he has right toe pinky amputation, and left large toe amputation - will use caution -   He is not using as it's too costly.   Will decrease insulin dose preventatively as we are starting sglt2i.   Sent in for patient assitance. He does not qualify - with new insurance.   He is going to notify me if he has trouble getting his meds.   Continue on medtronic 770 insulin pump -presets from 10 units with meals to 15 units with meals. No changes.   We have tried for cgm but cost has been high.   Encouraged to bolus for meals -   Entered in presets for him at last visit -   5 units for a snack.   10 or 15 units for medium or large meal.   Advised on over bolusing/correcting with just 5 units.    Advised to please avoid fast food, chips, processed foods.   Prior visits - I did split basal rates - 1.6 untis hour/during day time. - no changes today.   Icnreased night time to 1.75 units/hour - no changes today.   (12 am until 7am)   Reviewed back up plan - lantus 50 units daily- he has the pen  Patient likes to talk/text/use his smart phone so looking forward to insulin pump and getting sugars under control.   No history of pancreatitis or medullary thyroid cancer.   May plan to increase dose of trulicity if tolerated well.   Notify me if side effects such as severe abdominal pain, nausea, diarrhea.   Advise compliance with ADA diet and encourage exercise  Reviewed  hypoglycemia, s/s and appropriate tx. Have/get quick acting glucose tablets at hand.   Instructed to monitor Blood glucose 2 - 4x/day and bring meter/ log to every clinic visit.       2. HTN - controlled, continue meds as previously prescribed and monitor.   Coreg 12.5 po bid.   Lisinopril 40.   Urine mac + 202 ---> 415 - start sglt2i.   Using caution with amputation history.     3. HLD - LDL goal < 100. He is at goal.   Currently on statin therapy- lipitor 80 mg every HS.   History of MI/nstemi.   On aspirin daily.     4. Weight - BMI Body mass index is 36.46 kg/m².   Encourage Ada diet and exercise.   Restart glp1 -if he is able to take ozmepic.   He would be an excellent Mounjaro candidate.     5. Renal Function - stable. No history of nephropathy.   Has clinical urine mac +   Starting low dose farxiga.     6. Infections/s/p amputationss to toes ---> - now resulted in amputation - two toes  advised to gain compliance and not miss meal time boluses.   The high sugars are responsible culprit for the infection.   Following with podiatry.   I am starting farxiga but using with caution b/c of the history of amputations.       He has new insurance!!! [employer] - will see if issues with high deductible again?   Restart glp1 - will try for ozempic.  0.5mg every week.   Restart farxiga - \5mg tablet daily   Send in rx for dexcom g7 and  if he can afford, if insurance will approve.   Labs today -   F/u in 3 months           Addendum - was able to get him a voucher to start on the ozempic once per week -   He is not going to start on the dexcom b/c it's going to $375 - and he cannot afford.   Will send in florentino 3 to see if cheaper... AMY De Anda

## 2023-07-20 NOTE — PROGRESS NOTES
Subjective:      Patient ID: Billy Rivera is a 58 y.o. male.    Chief Complaint:  Follow-up wound care, right foot.      Billy is a 58 y.o. male who presents to the clinic for evaluation and treatment of high risk feet. Billy has a past medical history of Allergy, CAD (coronary artery disease), native coronary artery (6/25/2013), Cancer, COVID-19 virus detected (9/1/2020), Diabetes mellitus, Diabetes mellitus, type 2, Disorder of kidney and ureter, Heart attack (04/2012), colon cancer, stage I, Hyperlipidemia, Hypertension, Muscular pain, NSTEMI May 2013 - peak troponin 0.22 (5/22/2013), MICHAELA (obstructive sleep apnea), and Retinopathy due to secondary diabetes.  Patient presents for follow-up wound care.  No new complaints.  PCP: BRAD Baker MD    Date Last Seen by PCP: 3/23/2022    Current shoe gear:  Affected Foot: DARCO Shoe right foot     Affected Foot: Tennis shoe left foot    History of Trauma: negative  Sign of Infection: none        Hemoglobin A1C   Date Value Ref Range Status   04/06/2023 9.4 (H) 4.0 - 5.6 % Final     Comment:     ADA Screening Guidelines:  5.7-6.4%  Consistent with prediabetes  >or=6.5%  Consistent with diabetes    High levels of fetal hemoglobin interfere with the HbA1C  assay. Heterozygous hemoglobin variants (HbS, HgC, etc)do  not significantly interfere with this assay.   However, presence of multiple variants may affect accuracy.     11/10/2022 7.1 (H) 4.0 - 5.6 % Final     Comment:     ADA Screening Guidelines:  5.7-6.4%  Consistent with prediabetes  >or=6.5%  Consistent with diabetes    High levels of fetal hemoglobin interfere with the HbA1C  assay. Heterozygous hemoglobin variants (HbS, HgC, etc)do  not significantly interfere with this assay.   However, presence of multiple variants may affect accuracy.     08/09/2022 8.0 (H) 4.0 - 5.6 % Final     Comment:     ADA Screening Guidelines:  5.7-6.4%  Consistent with prediabetes  >or=6.5%  Consistent with  diabetes    High levels of fetal hemoglobin interfere with the HbA1C  assay. Heterozygous hemoglobin variants (HbS, HgC, etc)do  not significantly interfere with this assay.   However, presence of multiple variants may affect accuracy.                 Objective:        Physical Exam  Constitutional:       General: He is not in acute distress.     Appearance: Normal appearance. He is well-developed.   HENT:      Head: Normocephalic and atraumatic.   Cardiovascular:      Pulses:           Dorsalis pedis pulses are 2+ on the right side and 2+ on the left side.        Posterior tibial pulses are 2+ on the right side and 2+ on the left side.      Comments: CFT< 3 secs all toes bilateral foot, skin temp warm to cool bilateral foot, no hair growth bilateral lower extremity, no edema to bilateral lower extremities    Pulmonary:      Effort: Pulmonary effort is normal.   Musculoskeletal:         General: No swelling.      Right foot: Normal range of motion. No deformity.      Left foot: Normal range of motion. No deformity.      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     5/5 muscle strength with DF/PF/Inv/Ev bilateral.    Inspection and palpation of the joints and bones reveal no crepitus or joint effusion. No tenderness upon palpation.  Angle and base of gait are normal.    Feet:      Right foot:      Skin integrity: No skin breakdown or erythema.      Left foot:      Skin integrity: No skin breakdown or erythema.   Skin:     General: Skin is warm and dry.      Capillary Refill: Capillary refill takes 2 to 3 seconds.      Findings: No erythema.      Nails: There is no clubbing.      Comments: Ulceration location:  Ulcerations bilateral 2nd MPJ right foot noted to have an ulceration to the muscle tissue with no sign of infection.  This ulceration measures 0.2 cm in diameter by 0.1 cm in depth, post debridement, much improved since last visit.   Neurological:      Mental  "Status: He is alert and oriented to person, place, and time.      Sensory: Sensory deficit present.      Motor: No weakness.      Deep Tendon Reflexes:      Reflex Scores:       Patellar reflexes are 2+ on the right side and 2+ on the left side.       Achilles reflexes are 2+ on the right side and 2+ on the left side.     Comments: Diminished/loss of protective sensation all toes bilateral to 10 gram monofilament.   Psychiatric:         Mood and Affect: Mood normal.         Behavior: Behavior normal.         Thought Content: Thought content normal.             Assessment:       No diagnosis found.              Plan:       There are no diagnoses linked to this encounter.    Wound Debridement    Performed by: Mingo Melara DPM   Authorized by: Patient    Time out: Immediately prior to procedure a "time out" was called to verify the correct patient, procedure, equipment, support staff and site/side marked as required.   Consent Done?:  Yes (Verbal)  Local anesthesia used?: No       Wound Details:    Location:  Right foot     Type of Debridement:  Excisional       Length (cm):  0.2       Width (cm):  0.1       Depth (cm):  0.1       Percent Debrided (%):  100             Depth of debridement:  Subcutaneous tissue    Tissue debrided:  Dermis, Epidermis and Subcutaneous    Devitalized tissue debrided:  Biofilm, Callus and Necrotic/Eschar    Instruments:  Blade, Curette and Nippers     Bleeding:  Minimal  Hemostasis Achieved: Yes    Method Used:  Pressure  Patient tolerance:  Patient tolerated the procedure well with no immediate complications    The nature of the condition, options for management, as well as potential risks and complications were discussed in detail with patient. Patient was amenable to my recommendations and left my office fully informed and will follow up as instructed or sooner if necessary.      Wound dressing applied as well as Leticia/collagen and football dressing.  Continue current treatment " plan, follow-up in 1 week.

## 2023-07-26 ENCOUNTER — OFFICE VISIT (OUTPATIENT)
Dept: PODIATRY | Facility: CLINIC | Age: 58
End: 2023-07-26
Payer: COMMERCIAL

## 2023-07-26 VITALS
SYSTOLIC BLOOD PRESSURE: 150 MMHG | HEART RATE: 65 BPM | HEIGHT: 74 IN | DIASTOLIC BLOOD PRESSURE: 78 MMHG | BODY MASS INDEX: 36.44 KG/M2 | WEIGHT: 283.94 LBS

## 2023-07-26 DIAGNOSIS — L97.512 FOOT ULCER, RIGHT, WITH FAT LAYER EXPOSED: Primary | ICD-10-CM

## 2023-07-26 DIAGNOSIS — E11.49 TYPE II DIABETES MELLITUS WITH NEUROLOGICAL MANIFESTATIONS: ICD-10-CM

## 2023-07-26 DIAGNOSIS — I25.10 CORONARY ARTERY DISEASE INVOLVING NATIVE CORONARY ARTERY OF NATIVE HEART WITHOUT ANGINA PECTORIS: ICD-10-CM

## 2023-07-26 PROCEDURE — 4010F ACE/ARB THERAPY RXD/TAKEN: CPT | Mod: CPTII,S$GLB,, | Performed by: PODIATRIST

## 2023-07-26 PROCEDURE — 3008F PR BODY MASS INDEX (BMI) DOCUMENTED: ICD-10-PCS | Mod: CPTII,S$GLB,, | Performed by: PODIATRIST

## 2023-07-26 PROCEDURE — 3077F SYST BP >= 140 MM HG: CPT | Mod: CPTII,S$GLB,, | Performed by: PODIATRIST

## 2023-07-26 PROCEDURE — 3078F PR MOST RECENT DIASTOLIC BLOOD PRESSURE < 80 MM HG: ICD-10-PCS | Mod: CPTII,S$GLB,, | Performed by: PODIATRIST

## 2023-07-26 PROCEDURE — 3060F POS MICROALBUMINURIA REV: CPT | Mod: CPTII,S$GLB,, | Performed by: PODIATRIST

## 2023-07-26 PROCEDURE — 99213 OFFICE O/P EST LOW 20 MIN: CPT | Mod: 25,S$GLB,, | Performed by: PODIATRIST

## 2023-07-26 PROCEDURE — 3066F NEPHROPATHY DOC TX: CPT | Mod: CPTII,S$GLB,, | Performed by: PODIATRIST

## 2023-07-26 PROCEDURE — 3078F DIAST BP <80 MM HG: CPT | Mod: CPTII,S$GLB,, | Performed by: PODIATRIST

## 2023-07-26 PROCEDURE — 3052F HG A1C>EQUAL 8.0%<EQUAL 9.0%: CPT | Mod: CPTII,S$GLB,, | Performed by: PODIATRIST

## 2023-07-26 PROCEDURE — 3052F PR MOST RECENT HEMOGLOBIN A1C LEVEL 8.0 - < 9.0%: ICD-10-PCS | Mod: CPTII,S$GLB,, | Performed by: PODIATRIST

## 2023-07-26 PROCEDURE — 99999 PR PBB SHADOW E&M-EST. PATIENT-LVL III: CPT | Mod: PBBFAC,,, | Performed by: PODIATRIST

## 2023-07-26 PROCEDURE — 99999 PR PBB SHADOW E&M-EST. PATIENT-LVL III: ICD-10-PCS | Mod: PBBFAC,,, | Performed by: PODIATRIST

## 2023-07-26 PROCEDURE — 3060F PR POS MICROALBUMINURIA RESULT DOCUMENTED/REVIEW: ICD-10-PCS | Mod: CPTII,S$GLB,, | Performed by: PODIATRIST

## 2023-07-26 PROCEDURE — 11042 PR DEBRIDEMENT, SKIN, SUB-Q TISSUE,=<20 SQ CM: ICD-10-PCS | Mod: RT,S$GLB,, | Performed by: PODIATRIST

## 2023-07-26 PROCEDURE — 3008F BODY MASS INDEX DOCD: CPT | Mod: CPTII,S$GLB,, | Performed by: PODIATRIST

## 2023-07-26 PROCEDURE — 3077F PR MOST RECENT SYSTOLIC BLOOD PRESSURE >= 140 MM HG: ICD-10-PCS | Mod: CPTII,S$GLB,, | Performed by: PODIATRIST

## 2023-07-26 PROCEDURE — 3066F PR DOCUMENTATION OF TREATMENT FOR NEPHROPATHY: ICD-10-PCS | Mod: CPTII,S$GLB,, | Performed by: PODIATRIST

## 2023-07-26 PROCEDURE — 99213 PR OFFICE/OUTPT VISIT, EST, LEVL III, 20-29 MIN: ICD-10-PCS | Mod: 25,S$GLB,, | Performed by: PODIATRIST

## 2023-07-26 PROCEDURE — 11042 DBRDMT SUBQ TIS 1ST 20SQCM/<: CPT | Mod: RT,S$GLB,, | Performed by: PODIATRIST

## 2023-07-26 PROCEDURE — 4010F PR ACE/ARB THEARPY RXD/TAKEN: ICD-10-PCS | Mod: CPTII,S$GLB,, | Performed by: PODIATRIST

## 2023-07-26 NOTE — PROGRESS NOTES
HISTORY:    58-year-old male with a history of CAD status post LAD PCI 2013, hypertension, hyperlipidemia, diabetes, and obesity presenting for initial evaluation by me.     The patient denies any symptoms of chest pain, shortness of breath, or dyspnea on exertion.    Works as a  and is on his feet without limitation. No dedicated exercise.    The patient denies any previous history of stroke, congestive heart failure, or cardiomyopathy.     Patient currently tolerates aspirin 81 x 1, carvedilol 6.25 x 2, lisinopril 40 x 1, and atorvastatin 80 x 1.    PHYSICAL EXAM:    Vitals:    07/27/23 0756   BP: (!) 181/95   Pulse: (!) 50   Resp: 20       NAD, A+Ox3.  No jvd, no bruit.  RRR nml s1,s2. No murmurs.  CTA B no wheezes or crackles.  No edema.    LABS/STUDIES (imaging reviewed during clinic visit):    January 2023 hemoglobin 13.6 with MCV of 89.  July 2023 creatinine 0.9 with a BUN of 19.  Albumin 3.7.  LDL 71 and HDL 39.  Triglycerides 125.  ECG today SB no Qs/STs  TTE 2013 normal LV size and function with EF 60%.  Concentric remodeling.  Cardiac catheterization 2013 95% prox LAD status post successful PCI with SHELBY x1 (2.5 x 12 mm).  75% D1 and 50% distal circ.  RCA not imaged.  VASILIY 2017 normal at rest and with exercise bilaterally.    Peripheral catheterization 2018 patent aorta iliacs and bilateral femoral/infrainguinal arteries.      ASSESSMENT & PLAN:    1. Coronary artery disease involving native coronary artery of native heart without angina pectoris    2. Dyslipidemia associated with type 2 diabetes mellitus    3. Hypertension associated with diabetes        Orders Placed This Encounter    IN OFFICE EKG 12-LEAD (to Muse)    Echo    amLODIPine (NORVASC) 10 MG tablet        SIHD without CV symptoms. Cont asa 81x1.     LDL 71 on atorvastatin 80x1. Can consider ezeteimibe if it remains above 70.     Bps above goal on carvedilol 6.25 x 2 and lisinopril 40 x 1. Will add amlodipine 10x1.     Suffers  from diabetic foot ulcers s/p toe amputations. Nml VASILIY and peripheral cath previously. No e/o venous insufficiency.    Follow up in about 6 months (around 1/27/2024).      Santo Herrera MD     none

## 2023-07-27 ENCOUNTER — OFFICE VISIT (OUTPATIENT)
Dept: CARDIOLOGY | Facility: CLINIC | Age: 58
End: 2023-07-27
Payer: COMMERCIAL

## 2023-07-27 VITALS
HEIGHT: 74 IN | HEART RATE: 50 BPM | WEIGHT: 284 LBS | BODY MASS INDEX: 36.45 KG/M2 | RESPIRATION RATE: 20 BRPM | SYSTOLIC BLOOD PRESSURE: 181 MMHG | DIASTOLIC BLOOD PRESSURE: 95 MMHG

## 2023-07-27 DIAGNOSIS — E11.69 DYSLIPIDEMIA ASSOCIATED WITH TYPE 2 DIABETES MELLITUS: ICD-10-CM

## 2023-07-27 DIAGNOSIS — E11.59 HYPERTENSION ASSOCIATED WITH DIABETES: Chronic | ICD-10-CM

## 2023-07-27 DIAGNOSIS — E78.5 DYSLIPIDEMIA ASSOCIATED WITH TYPE 2 DIABETES MELLITUS: ICD-10-CM

## 2023-07-27 DIAGNOSIS — I25.10 CORONARY ARTERY DISEASE INVOLVING NATIVE CORONARY ARTERY OF NATIVE HEART WITHOUT ANGINA PECTORIS: Primary | ICD-10-CM

## 2023-07-27 DIAGNOSIS — I15.2 HYPERTENSION ASSOCIATED WITH DIABETES: Chronic | ICD-10-CM

## 2023-07-27 PROCEDURE — 3080F PR MOST RECENT DIASTOLIC BLOOD PRESSURE >= 90 MM HG: ICD-10-PCS | Mod: CPTII,S$GLB,, | Performed by: INTERNAL MEDICINE

## 2023-07-27 PROCEDURE — 3060F PR POS MICROALBUMINURIA RESULT DOCUMENTED/REVIEW: ICD-10-PCS | Mod: CPTII,S$GLB,, | Performed by: INTERNAL MEDICINE

## 2023-07-27 PROCEDURE — 1159F MED LIST DOCD IN RCRD: CPT | Mod: CPTII,S$GLB,, | Performed by: INTERNAL MEDICINE

## 2023-07-27 PROCEDURE — 1160F PR REVIEW ALL MEDS BY PRESCRIBER/CLIN PHARMACIST DOCUMENTED: ICD-10-PCS | Mod: CPTII,S$GLB,, | Performed by: INTERNAL MEDICINE

## 2023-07-27 PROCEDURE — 4010F ACE/ARB THERAPY RXD/TAKEN: CPT | Mod: CPTII,S$GLB,, | Performed by: INTERNAL MEDICINE

## 2023-07-27 PROCEDURE — 4010F PR ACE/ARB THEARPY RXD/TAKEN: ICD-10-PCS | Mod: CPTII,S$GLB,, | Performed by: INTERNAL MEDICINE

## 2023-07-27 PROCEDURE — 3077F SYST BP >= 140 MM HG: CPT | Mod: CPTII,S$GLB,, | Performed by: INTERNAL MEDICINE

## 2023-07-27 PROCEDURE — 3008F PR BODY MASS INDEX (BMI) DOCUMENTED: ICD-10-PCS | Mod: CPTII,S$GLB,, | Performed by: INTERNAL MEDICINE

## 2023-07-27 PROCEDURE — 3052F HG A1C>EQUAL 8.0%<EQUAL 9.0%: CPT | Mod: CPTII,S$GLB,, | Performed by: INTERNAL MEDICINE

## 2023-07-27 PROCEDURE — 3060F POS MICROALBUMINURIA REV: CPT | Mod: CPTII,S$GLB,, | Performed by: INTERNAL MEDICINE

## 2023-07-27 PROCEDURE — 93000 ELECTROCARDIOGRAM COMPLETE: CPT | Mod: S$GLB,,, | Performed by: INTERNAL MEDICINE

## 2023-07-27 PROCEDURE — 99204 PR OFFICE/OUTPT VISIT, NEW, LEVL IV, 45-59 MIN: ICD-10-PCS | Mod: S$GLB,,, | Performed by: INTERNAL MEDICINE

## 2023-07-27 PROCEDURE — 99999 PR PBB SHADOW E&M-EST. PATIENT-LVL V: CPT | Mod: PBBFAC,,, | Performed by: INTERNAL MEDICINE

## 2023-07-27 PROCEDURE — 99204 OFFICE O/P NEW MOD 45 MIN: CPT | Mod: S$GLB,,, | Performed by: INTERNAL MEDICINE

## 2023-07-27 PROCEDURE — 3066F NEPHROPATHY DOC TX: CPT | Mod: CPTII,S$GLB,, | Performed by: INTERNAL MEDICINE

## 2023-07-27 PROCEDURE — 99999 PR PBB SHADOW E&M-EST. PATIENT-LVL V: ICD-10-PCS | Mod: PBBFAC,,, | Performed by: INTERNAL MEDICINE

## 2023-07-27 PROCEDURE — 1160F RVW MEDS BY RX/DR IN RCRD: CPT | Mod: CPTII,S$GLB,, | Performed by: INTERNAL MEDICINE

## 2023-07-27 PROCEDURE — 93000 EKG 12-LEAD: ICD-10-PCS | Mod: S$GLB,,, | Performed by: INTERNAL MEDICINE

## 2023-07-27 PROCEDURE — 3052F PR MOST RECENT HEMOGLOBIN A1C LEVEL 8.0 - < 9.0%: ICD-10-PCS | Mod: CPTII,S$GLB,, | Performed by: INTERNAL MEDICINE

## 2023-07-27 PROCEDURE — 3080F DIAST BP >= 90 MM HG: CPT | Mod: CPTII,S$GLB,, | Performed by: INTERNAL MEDICINE

## 2023-07-27 PROCEDURE — 1159F PR MEDICATION LIST DOCUMENTED IN MEDICAL RECORD: ICD-10-PCS | Mod: CPTII,S$GLB,, | Performed by: INTERNAL MEDICINE

## 2023-07-27 PROCEDURE — 3008F BODY MASS INDEX DOCD: CPT | Mod: CPTII,S$GLB,, | Performed by: INTERNAL MEDICINE

## 2023-07-27 PROCEDURE — 3077F PR MOST RECENT SYSTOLIC BLOOD PRESSURE >= 140 MM HG: ICD-10-PCS | Mod: CPTII,S$GLB,, | Performed by: INTERNAL MEDICINE

## 2023-07-27 PROCEDURE — 3066F PR DOCUMENTATION OF TREATMENT FOR NEPHROPATHY: ICD-10-PCS | Mod: CPTII,S$GLB,, | Performed by: INTERNAL MEDICINE

## 2023-07-27 RX ORDER — AMLODIPINE BESYLATE 10 MG/1
10 TABLET ORAL DAILY
Qty: 90 TABLET | Refills: 3 | Status: SHIPPED | OUTPATIENT
Start: 2023-07-27 | End: 2024-04-03 | Stop reason: SDUPTHER

## 2023-07-30 NOTE — PROGRESS NOTES
Subjective:      Patient ID: Billy Rivera is a 58 y.o. male.    Chief Complaint:  Follow-up wound care, right foot.      Billy is a 58 y.o. male who presents to the clinic for evaluation and treatment of high risk feet. Billy has a past medical history of Allergy, CAD (coronary artery disease), native coronary artery (6/25/2013), Cancer, COVID-19 virus detected (9/1/2020), Diabetes mellitus, Diabetes mellitus, type 2, Disorder of kidney and ureter, Heart attack (04/2012), colon cancer, stage I, Hyperlipidemia, Hypertension, Muscular pain, NSTEMI May 2013 - peak troponin 0.22 (5/22/2013), MICHAELA (obstructive sleep apnea), and Retinopathy due to secondary diabetes.  Patient presents for follow-up wound care.  No new complaints.  PCP: BRAD Baker MD    Date Last Seen by PCP: 3/23/2022    Current shoe gear:  Affected Foot: DARCO Shoe right foot     Affected Foot: Tennis shoe left foot    History of Trauma: negative  Sign of Infection: none        Hemoglobin A1C   Date Value Ref Range Status   07/20/2023 8.7 (H) 4.0 - 5.6 % Final     Comment:     ADA Screening Guidelines:  5.7-6.4%  Consistent with prediabetes  >or=6.5%  Consistent with diabetes    High levels of fetal hemoglobin interfere with the HbA1C  assay. Heterozygous hemoglobin variants (HbS, HgC, etc)do  not significantly interfere with this assay.   However, presence of multiple variants may affect accuracy.     04/06/2023 9.4 (H) 4.0 - 5.6 % Final     Comment:     ADA Screening Guidelines:  5.7-6.4%  Consistent with prediabetes  >or=6.5%  Consistent with diabetes    High levels of fetal hemoglobin interfere with the HbA1C  assay. Heterozygous hemoglobin variants (HbS, HgC, etc)do  not significantly interfere with this assay.   However, presence of multiple variants may affect accuracy.     11/10/2022 7.1 (H) 4.0 - 5.6 % Final     Comment:     ADA Screening Guidelines:  5.7-6.4%  Consistent with prediabetes  >or=6.5%  Consistent with  diabetes    High levels of fetal hemoglobin interfere with the HbA1C  assay. Heterozygous hemoglobin variants (HbS, HgC, etc)do  not significantly interfere with this assay.   However, presence of multiple variants may affect accuracy.                 Objective:        Physical Exam  Constitutional:       General: He is not in acute distress.     Appearance: Normal appearance. He is well-developed.   HENT:      Head: Normocephalic and atraumatic.   Cardiovascular:      Pulses:           Dorsalis pedis pulses are 2+ on the right side and 2+ on the left side.        Posterior tibial pulses are 2+ on the right side and 2+ on the left side.      Comments: CFT< 3 secs all toes bilateral foot, skin temp warm to cool bilateral foot, no hair growth bilateral lower extremity, no edema to bilateral lower extremities    Pulmonary:      Effort: Pulmonary effort is normal.   Musculoskeletal:         General: No swelling.      Right foot: Normal range of motion. No deformity.      Left foot: Normal range of motion. No deformity.      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     5/5 muscle strength with DF/PF/Inv/Ev bilateral.    Inspection and palpation of the joints and bones reveal no crepitus or joint effusion. No tenderness upon palpation.  Angle and base of gait are normal.    Feet:      Right foot:      Skin integrity: No skin breakdown or erythema.      Left foot:      Skin integrity: No skin breakdown or erythema.   Skin:     General: Skin is warm and dry.      Capillary Refill: Capillary refill takes 2 to 3 seconds.      Findings: No erythema.      Nails: There is no clubbing.      Comments: Ulceration location:  Ulcerations bilateral 2nd MPJ right foot noted to have an ulceration to the muscle tissue with no sign of infection.  This ulceration measures 0.2 cm in diameter by 0.1 cm in depth, post debridement, much improved since last visit.   Neurological:      Mental  "Status: He is alert and oriented to person, place, and time.      Sensory: Sensory deficit present.      Motor: No weakness.      Deep Tendon Reflexes:      Reflex Scores:       Patellar reflexes are 2+ on the right side and 2+ on the left side.       Achilles reflexes are 2+ on the right side and 2+ on the left side.     Comments: Diminished/loss of protective sensation all toes bilateral to 10 gram monofilament.   Psychiatric:         Mood and Affect: Mood normal.         Behavior: Behavior normal.         Thought Content: Thought content normal.               Assessment:       Encounter Diagnoses   Name Primary?    Foot ulcer, right, with fat layer exposed Yes    Type II diabetes mellitus with neurological manifestations     Coronary artery disease involving native coronary artery of native heart without angina pectoris                  Plan:       Billy was seen today for wound check.    Diagnoses and all orders for this visit:    Foot ulcer, right, with fat layer exposed    Type II diabetes mellitus with neurological manifestations    Coronary artery disease involving native coronary artery of native heart without angina pectoris      Decision making:  Chronic illnesses discussed in detail, previous records/notes and imaging independently reviewed, prescription drug management performed in addition to lengthy discussion regarding both conservative and surgical treatment options.    Wound Debridement    Performed by: Mingo Melara DPM   Authorized by: Patient    Time out: Immediately prior to procedure a "time out" was called to verify the correct patient, procedure, equipment, support staff and site/side marked as required.   Consent Done?:  Yes (Verbal)  Local anesthesia used?: No       Wound Details:    Location:  Right foot     Type of Debridement:  Excisional       Length (cm):  0.2       Width (cm):  0.1       Depth (cm):  0.1       Percent Debrided (%):  100             Depth of debridement:  " Subcutaneous tissue    Tissue debrided:  Dermis, Epidermis and Subcutaneous    Devitalized tissue debrided:  Biofilm, Callus and Necrotic/Eschar    Instruments:  Blade, Curette and Nippers     Bleeding:  Minimal  Hemostasis Achieved: Yes    Method Used:  Pressure  Patient tolerance:  Patient tolerated the procedure well with no immediate complications    The nature of the condition, options for management, as well as potential risks and complications were discussed in detail with patient. Patient was amenable to my recommendations and left my office fully informed and will follow up as instructed or sooner if necessary.      Wound dressing applied as well as Leticia/collagen and football dressing.  Continue current treatment plan, follow-up in 1 week.

## 2023-08-01 ENCOUNTER — TELEPHONE (OUTPATIENT)
Dept: PODIATRY | Facility: CLINIC | Age: 58
End: 2023-08-01
Payer: COMMERCIAL

## 2023-08-01 NOTE — TELEPHONE ENCOUNTER
Spoke with patient to help schedule appointment. Patient voice understanding to conversation to new appointment and time.

## 2023-08-03 ENCOUNTER — OFFICE VISIT (OUTPATIENT)
Dept: PODIATRY | Facility: CLINIC | Age: 58
End: 2023-08-03
Payer: COMMERCIAL

## 2023-08-03 VITALS
SYSTOLIC BLOOD PRESSURE: 100 MMHG | HEART RATE: 55 BPM | BODY MASS INDEX: 36.44 KG/M2 | DIASTOLIC BLOOD PRESSURE: 64 MMHG | WEIGHT: 283.94 LBS | HEIGHT: 74 IN

## 2023-08-03 DIAGNOSIS — L97.512 FOOT ULCER, RIGHT, WITH FAT LAYER EXPOSED: ICD-10-CM

## 2023-08-03 DIAGNOSIS — E11.49 TYPE II DIABETES MELLITUS WITH NEUROLOGICAL MANIFESTATIONS: Primary | ICD-10-CM

## 2023-08-03 PROCEDURE — 99999 PR PBB SHADOW E&M-EST. PATIENT-LVL III: ICD-10-PCS | Mod: PBBFAC,,, | Performed by: PODIATRIST

## 2023-08-03 PROCEDURE — 11042 PR DEBRIDEMENT, SKIN, SUB-Q TISSUE,=<20 SQ CM: ICD-10-PCS | Mod: S$GLB,,, | Performed by: PODIATRIST

## 2023-08-03 PROCEDURE — 99999 PR PBB SHADOW E&M-EST. PATIENT-LVL III: CPT | Mod: PBBFAC,,, | Performed by: PODIATRIST

## 2023-08-03 PROCEDURE — 99499 UNLISTED E&M SERVICE: CPT | Mod: S$GLB,,, | Performed by: PODIATRIST

## 2023-08-03 PROCEDURE — 11042 DBRDMT SUBQ TIS 1ST 20SQCM/<: CPT | Mod: S$GLB,,, | Performed by: PODIATRIST

## 2023-08-03 PROCEDURE — 99499 NO LOS: ICD-10-PCS | Mod: S$GLB,,, | Performed by: PODIATRIST

## 2023-08-03 NOTE — PROGRESS NOTES
Subjective:      Patient ID: Billy Rivera is a 58 y.o. male.    Chief Complaint:  Follow-up wound care, right foot.      Billy is a 58 y.o. male who presents to the clinic for evaluation and treatment of high risk feet. Billy has a past medical history of Allergy, CAD (coronary artery disease), native coronary artery (6/25/2013), Cancer, COVID-19 virus detected (9/1/2020), Diabetes mellitus, Diabetes mellitus, type 2, Disorder of kidney and ureter, Heart attack (04/2012), colon cancer, stage I, Hyperlipidemia, Hypertension, Muscular pain, NSTEMI May 2013 - peak troponin 0.22 (5/22/2013), MICHAELA (obstructive sleep apnea), and Retinopathy due to secondary diabetes.  Patient presents for follow-up wound care.  No new complaints. Patient has been in football dressing, ambulating in Darco shoe x 1 week with out issues         PCP: DRE Baker MD    Date Last Seen by PCP: 3/23/2022    Current shoe gear:  Affected Foot: DARCO Shoe right foot     Affected Foot: Tennis shoe left foot    History of Trauma: negative  Sign of Infection: none        Hemoglobin A1C   Date Value Ref Range Status   07/20/2023 8.7 (H) 4.0 - 5.6 % Final     Comment:     ADA Screening Guidelines:  5.7-6.4%  Consistent with prediabetes  >or=6.5%  Consistent with diabetes    High levels of fetal hemoglobin interfere with the HbA1C  assay. Heterozygous hemoglobin variants (HbS, HgC, etc)do  not significantly interfere with this assay.   However, presence of multiple variants may affect accuracy.     04/06/2023 9.4 (H) 4.0 - 5.6 % Final     Comment:     ADA Screening Guidelines:  5.7-6.4%  Consistent with prediabetes  >or=6.5%  Consistent with diabetes    High levels of fetal hemoglobin interfere with the HbA1C  assay. Heterozygous hemoglobin variants (HbS, HgC, etc)do  not significantly interfere with this assay.   However, presence of multiple variants may affect accuracy.     11/10/2022 7.1 (H) 4.0 - 5.6 % Final     Comment:      "ADA Screening Guidelines:  5.7-6.4%  Consistent with prediabetes  >or=6.5%  Consistent with diabetes    High levels of fetal hemoglobin interfere with the HbA1C  assay. Heterozygous hemoglobin variants (HbS, HgC, etc)do  not significantly interfere with this assay.   However, presence of multiple variants may affect accuracy.                 Objective:      Vitals:    08/03/23 0827   BP: 100/64   Pulse: (!) 55   Weight: 128.8 kg (283 lb 15.2 oz)   Height: 6' 2" (1.88 m)   PainSc: 0-No pain        Physical Exam  Constitutional:       General: He is not in acute distress.     Appearance: Normal appearance. He is well-developed.   HENT:      Head: Normocephalic and atraumatic.   Cardiovascular:      Pulses:           Dorsalis pedis pulses are 2+ on the right side and 2+ on the left side.        Posterior tibial pulses are 2+ on the right side and 2+ on the left side.      Comments: CFT< 3 secs all toes bilateral foot, skin temp warm to cool bilateral foot, no hair growth bilateral lower extremity, no edema to bilateral lower extremities    Pulmonary:      Effort: Pulmonary effort is normal.   Musculoskeletal:         General: No swelling.      Right foot: Normal range of motion. No deformity.      Left foot: Normal range of motion. No deformity.      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     5/5 muscle strength with DF/PF/Inv/Ev bilateral.    Inspection and palpation of the joints and bones reveal no crepitus or joint effusion. No tenderness upon palpation.  Angle and base of gait are normal.    Feet:      Right foot:      Skin integrity: No skin breakdown or erythema.      Left foot:      Skin integrity: No skin breakdown or erythema.   Skin:     General: Skin is warm and dry.      Capillary Refill: Capillary refill takes 2 to 3 seconds.      Findings: No erythema.      Nails: There is no clubbing.      Comments: Ulceration location:  Ulcerations bilateral 2nd MPJ " right foot noted to have an ulceration to the muscle tissue with no sign of infection.  This ulceration measures 0.2 cm in diameter by 0.1 cm in depth, post debridement, much improved since last visit.   Neurological:      Mental Status: He is alert and oriented to person, place, and time.      Sensory: Sensory deficit present.      Motor: No weakness.      Deep Tendon Reflexes:      Reflex Scores:       Patellar reflexes are 2+ on the right side and 2+ on the left side.       Achilles reflexes are 2+ on the right side and 2+ on the left side.     Comments: Diminished/loss of protective sensation all toes bilateral to 10 gram monofilament.   Psychiatric:         Mood and Affect: Mood normal.         Behavior: Behavior normal.         Thought Content: Thought content normal.               Assessment:       Encounter Diagnoses   Name Primary?    Type II diabetes mellitus with neurological manifestations Yes    Foot ulcer, right, with fat layer exposed                  Plan:       Billy was seen today for wound check and diabetes mellitus.    Diagnoses and all orders for this visit:    Type II diabetes mellitus with neurological manifestations    Foot ulcer, right, with fat layer exposed      Independent review of patients previous clinical notes    Reviewed current medication(s), and lab(s) specific to foot ailment(s) with patient in detail. All questions answered     Debridement: With verbal consent, nonviable tissues on the right foot were debrided beyond sub q utilizing a  sterile No. 3 scalpel and forceps. Minimal bleeding controlled with direct pressure  The patient tolerated this well.     Dressings: Hydrofera Blue ready  Offloading:Ibis Aceves MA assisted w/ application of football dressing under my direct supervision. Darco shoe applied to offload the area. Advised patient that this should be worn on the affected foot at all times when ambulating     Follow-up:Patient is to return to the clinic in 1  week  for follow-up but should [facility] immediately if any signs of infection, such as fever, chills, sweats, increased redness or pain.    Short-term goals include maintaining good offloading and minimizing bioburden, promoting granulation and epithelialization to healing.  Long-term goals include keeping the wound healed by good offloading and medical management under the direction of internist.

## 2023-08-09 ENCOUNTER — OFFICE VISIT (OUTPATIENT)
Dept: PODIATRY | Facility: CLINIC | Age: 58
End: 2023-08-09
Payer: COMMERCIAL

## 2023-08-09 VITALS
DIASTOLIC BLOOD PRESSURE: 64 MMHG | HEIGHT: 74 IN | SYSTOLIC BLOOD PRESSURE: 100 MMHG | HEART RATE: 55 BPM | WEIGHT: 282.19 LBS | BODY MASS INDEX: 36.22 KG/M2

## 2023-08-09 DIAGNOSIS — I25.10 CORONARY ARTERY DISEASE INVOLVING NATIVE CORONARY ARTERY OF NATIVE HEART WITHOUT ANGINA PECTORIS: ICD-10-CM

## 2023-08-09 DIAGNOSIS — L97.512 FOOT ULCER, RIGHT, WITH FAT LAYER EXPOSED: ICD-10-CM

## 2023-08-09 DIAGNOSIS — M79.671 RIGHT FOOT PAIN: ICD-10-CM

## 2023-08-09 DIAGNOSIS — E11.49 TYPE II DIABETES MELLITUS WITH NEUROLOGICAL MANIFESTATIONS: Primary | ICD-10-CM

## 2023-08-09 PROCEDURE — 99999 PR PBB SHADOW E&M-EST. PATIENT-LVL IV: ICD-10-PCS | Mod: PBBFAC,,, | Performed by: PODIATRIST

## 2023-08-09 PROCEDURE — 99999 PR PBB SHADOW E&M-EST. PATIENT-LVL IV: CPT | Mod: PBBFAC,,, | Performed by: PODIATRIST

## 2023-08-09 PROCEDURE — 3052F PR MOST RECENT HEMOGLOBIN A1C LEVEL 8.0 - < 9.0%: ICD-10-PCS | Mod: CPTII,S$GLB,, | Performed by: PODIATRIST

## 2023-08-09 PROCEDURE — 11042 PR DEBRIDEMENT, SKIN, SUB-Q TISSUE,=<20 SQ CM: ICD-10-PCS | Mod: RT,S$GLB,, | Performed by: PODIATRIST

## 2023-08-09 PROCEDURE — 4010F ACE/ARB THERAPY RXD/TAKEN: CPT | Mod: CPTII,S$GLB,, | Performed by: PODIATRIST

## 2023-08-09 PROCEDURE — 4010F PR ACE/ARB THEARPY RXD/TAKEN: ICD-10-PCS | Mod: CPTII,S$GLB,, | Performed by: PODIATRIST

## 2023-08-09 PROCEDURE — 3060F POS MICROALBUMINURIA REV: CPT | Mod: CPTII,S$GLB,, | Performed by: PODIATRIST

## 2023-08-09 PROCEDURE — 3066F PR DOCUMENTATION OF TREATMENT FOR NEPHROPATHY: ICD-10-PCS | Mod: CPTII,S$GLB,, | Performed by: PODIATRIST

## 2023-08-09 PROCEDURE — 3074F SYST BP LT 130 MM HG: CPT | Mod: CPTII,S$GLB,, | Performed by: PODIATRIST

## 2023-08-09 PROCEDURE — 99213 PR OFFICE/OUTPT VISIT, EST, LEVL III, 20-29 MIN: ICD-10-PCS | Mod: 25,S$GLB,, | Performed by: PODIATRIST

## 2023-08-09 PROCEDURE — 3008F BODY MASS INDEX DOCD: CPT | Mod: CPTII,S$GLB,, | Performed by: PODIATRIST

## 2023-08-09 PROCEDURE — 99213 OFFICE O/P EST LOW 20 MIN: CPT | Mod: 25,S$GLB,, | Performed by: PODIATRIST

## 2023-08-09 PROCEDURE — 11042 DBRDMT SUBQ TIS 1ST 20SQCM/<: CPT | Mod: RT,S$GLB,, | Performed by: PODIATRIST

## 2023-08-09 PROCEDURE — 3078F DIAST BP <80 MM HG: CPT | Mod: CPTII,S$GLB,, | Performed by: PODIATRIST

## 2023-08-09 PROCEDURE — 3078F PR MOST RECENT DIASTOLIC BLOOD PRESSURE < 80 MM HG: ICD-10-PCS | Mod: CPTII,S$GLB,, | Performed by: PODIATRIST

## 2023-08-09 PROCEDURE — 1159F MED LIST DOCD IN RCRD: CPT | Mod: CPTII,S$GLB,, | Performed by: PODIATRIST

## 2023-08-09 PROCEDURE — 3052F HG A1C>EQUAL 8.0%<EQUAL 9.0%: CPT | Mod: CPTII,S$GLB,, | Performed by: PODIATRIST

## 2023-08-09 PROCEDURE — 3060F PR POS MICROALBUMINURIA RESULT DOCUMENTED/REVIEW: ICD-10-PCS | Mod: CPTII,S$GLB,, | Performed by: PODIATRIST

## 2023-08-09 PROCEDURE — 3074F PR MOST RECENT SYSTOLIC BLOOD PRESSURE < 130 MM HG: ICD-10-PCS | Mod: CPTII,S$GLB,, | Performed by: PODIATRIST

## 2023-08-09 PROCEDURE — 3066F NEPHROPATHY DOC TX: CPT | Mod: CPTII,S$GLB,, | Performed by: PODIATRIST

## 2023-08-09 PROCEDURE — 3008F PR BODY MASS INDEX (BMI) DOCUMENTED: ICD-10-PCS | Mod: CPTII,S$GLB,, | Performed by: PODIATRIST

## 2023-08-09 PROCEDURE — 1159F PR MEDICATION LIST DOCUMENTED IN MEDICAL RECORD: ICD-10-PCS | Mod: CPTII,S$GLB,, | Performed by: PODIATRIST

## 2023-08-09 NOTE — PROGRESS NOTES
Subjective:      Patient ID: Billy Rivera is a 58 y.o. male.    Chief Complaint:  Follow-up wound care, right foot.      Billy is a 58 y.o. male who presents to the clinic for evaluation and treatment of high risk feet. Billy has a past medical history of Allergy, CAD (coronary artery disease), native coronary artery (6/25/2013), Cancer, COVID-19 virus detected (9/1/2020), Diabetes mellitus, Diabetes mellitus, type 2, Disorder of kidney and ureter, Heart attack (04/2012), colon cancer, stage I, Hyperlipidemia, Hypertension, Muscular pain, NSTEMI May 2013 - peak troponin 0.22 (5/22/2013), MICHAELA (obstructive sleep apnea), and Retinopathy due to secondary diabetes.  Patient presents for follow-up wound care.  No new complaints. Patient has been in football dressing, ambulating in Darco shoe x 1 week with out issues.  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        PCP: DRE Baker MD    Date Last Seen by PCP: 3/23/2022    Current shoe gear:  Affected Foot: DARCO Shoe right foot     Affected Foot: Tennis shoe left foot    History of Trauma: negative  Sign of Infection: none        Hemoglobin A1C   Date Value Ref Range Status   07/20/2023 8.7 (H) 4.0 - 5.6 % Final     Comment:     ADA Screening Guidelines:  5.7-6.4%  Consistent with prediabetes  >or=6.5%  Consistent with diabetes    High levels of fetal hemoglobin interfere with the HbA1C  assay. Heterozygous hemoglobin variants (HbS, HgC, etc)do  not significantly interfere with this assay.   However, presence of multiple variants may affect accuracy.     04/06/2023 9.4 (H) 4.0 - 5.6 % Final     Comment:     ADA Screening Guidelines:  5.7-6.4%  Consistent with prediabetes  >or=6.5%  Consistent with diabetes    High levels of fetal hemoglobin interfere with the HbA1C  assay. Heterozygous hemoglobin variants (HbS, HgC, etc)do  not significantly interfere with this assay.   However,  "presence of multiple variants may affect accuracy.     11/10/2022 7.1 (H) 4.0 - 5.6 % Final     Comment:     ADA Screening Guidelines:  5.7-6.4%  Consistent with prediabetes  >or=6.5%  Consistent with diabetes    High levels of fetal hemoglobin interfere with the HbA1C  assay. Heterozygous hemoglobin variants (HbS, HgC, etc)do  not significantly interfere with this assay.   However, presence of multiple variants may affect accuracy.                 Objective:      Vitals:    08/09/23 0900   BP: 100/64   Pulse: (!) 55   Weight: 128 kg (282 lb 3 oz)   Height: 6' 2" (1.88 m)   PainSc: 0-No pain        Physical Exam  Constitutional:       General: He is not in acute distress.     Appearance: Normal appearance. He is well-developed.   HENT:      Head: Normocephalic and atraumatic.   Cardiovascular:      Pulses:           Dorsalis pedis pulses are 2+ on the right side and 2+ on the left side.        Posterior tibial pulses are 2+ on the right side and 2+ on the left side.      Comments: CFT< 3 secs all toes bilateral foot, skin temp warm to cool bilateral foot, no hair growth bilateral lower extremity, no edema to bilateral lower extremities    Pulmonary:      Effort: Pulmonary effort is normal.   Musculoskeletal:         General: No swelling.      Right foot: Normal range of motion. No deformity.      Left foot: Normal range of motion. No deformity.      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     5/5 muscle strength with DF/PF/Inv/Ev bilateral.    Inspection and palpation of the joints and bones reveal no crepitus or joint effusion. No tenderness upon palpation.  Angle and base of gait are normal.    Feet:      Right foot:      Skin integrity: No skin breakdown or erythema.      Left foot:      Skin integrity: No skin breakdown or erythema.   Skin:     General: Skin is warm and dry.      Capillary Refill: Capillary refill takes 2 to 3 seconds.      Findings: No erythema. "      Nails: There is no clubbing.      Comments: Ulceration location:  Ulcerations bilateral 2nd MPJ right foot noted to have an ulceration to the muscle tissue with no sign of infection.  This ulceration measures 0.2 cm in diameter by 0.1 cm in depth, post debridement, much improved since last visit.   Neurological:      Mental Status: He is alert and oriented to person, place, and time.      Sensory: Sensory deficit present.      Motor: No weakness.      Deep Tendon Reflexes:      Reflex Scores:       Patellar reflexes are 2+ on the right side and 2+ on the left side.       Achilles reflexes are 2+ on the right side and 2+ on the left side.     Comments: Diminished/loss of protective sensation all toes bilateral to 10 gram monofilament.   Psychiatric:         Mood and Affect: Mood normal.         Behavior: Behavior normal.         Thought Content: Thought content normal.               Assessment:       Encounter Diagnoses   Name Primary?    Type II diabetes mellitus with neurological manifestations Yes    Foot ulcer, right, with fat layer exposed     Coronary artery disease involving native coronary artery of native heart without angina pectoris     Right foot pain                  Plan:       Billy was seen today for wound check.    Diagnoses and all orders for this visit:    Type II diabetes mellitus with neurological manifestations    Foot ulcer, right, with fat layer exposed    Coronary artery disease involving native coronary artery of native heart without angina pectoris    Right foot pain      Independent review of patients previous clinical notes    Reviewed current medication(s), and lab(s) specific to foot ailment(s) with patient in detail. All questions answered     Debridement: With verbal consent, nonviable tissues on the right foot were debrided beyond sub q utilizing a  sterile No. 3 scalpel and forceps. Minimal bleeding controlled with direct pressure  The patient tolerated this well.      Dressings: Hydrofera Blue ready  Offloading:Foam      Follow-up:Patient is to return to the clinic in 1 week  for follow-up but should call [facility] immediately if any signs of infection, such as fever, chills, sweats, increased redness or pain.    Short-term goals include maintaining good offloading and minimizing bioburden, promoting granulation and epithelialization to healing.  Long-term goals include keeping the wound healed by good offloading and medical management under the direction of internist.    Follow-up in 2 weeks.

## 2023-08-23 ENCOUNTER — OFFICE VISIT (OUTPATIENT)
Dept: PODIATRY | Facility: CLINIC | Age: 58
End: 2023-08-23
Payer: COMMERCIAL

## 2023-08-23 VITALS
HEIGHT: 74 IN | BODY MASS INDEX: 36.22 KG/M2 | WEIGHT: 282.19 LBS | HEART RATE: 55 BPM | DIASTOLIC BLOOD PRESSURE: 67 MMHG | SYSTOLIC BLOOD PRESSURE: 111 MMHG

## 2023-08-23 DIAGNOSIS — E11.49 TYPE II DIABETES MELLITUS WITH NEUROLOGICAL MANIFESTATIONS: Primary | ICD-10-CM

## 2023-08-23 DIAGNOSIS — L97.512 FOOT ULCER, RIGHT, WITH FAT LAYER EXPOSED: ICD-10-CM

## 2023-08-23 PROCEDURE — 3052F HG A1C>EQUAL 8.0%<EQUAL 9.0%: CPT | Mod: CPTII,S$GLB,, | Performed by: PODIATRIST

## 2023-08-23 PROCEDURE — 1159F PR MEDICATION LIST DOCUMENTED IN MEDICAL RECORD: ICD-10-PCS | Mod: CPTII,S$GLB,, | Performed by: PODIATRIST

## 2023-08-23 PROCEDURE — 99213 OFFICE O/P EST LOW 20 MIN: CPT | Mod: 25,S$GLB,, | Performed by: PODIATRIST

## 2023-08-23 PROCEDURE — 3066F PR DOCUMENTATION OF TREATMENT FOR NEPHROPATHY: ICD-10-PCS | Mod: CPTII,S$GLB,, | Performed by: PODIATRIST

## 2023-08-23 PROCEDURE — 4010F PR ACE/ARB THEARPY RXD/TAKEN: ICD-10-PCS | Mod: CPTII,S$GLB,, | Performed by: PODIATRIST

## 2023-08-23 PROCEDURE — 3060F PR POS MICROALBUMINURIA RESULT DOCUMENTED/REVIEW: ICD-10-PCS | Mod: CPTII,S$GLB,, | Performed by: PODIATRIST

## 2023-08-23 PROCEDURE — 3074F SYST BP LT 130 MM HG: CPT | Mod: CPTII,S$GLB,, | Performed by: PODIATRIST

## 2023-08-23 PROCEDURE — 99999 PR PBB SHADOW E&M-EST. PATIENT-LVL IV: CPT | Mod: PBBFAC,,, | Performed by: PODIATRIST

## 2023-08-23 PROCEDURE — 3066F NEPHROPATHY DOC TX: CPT | Mod: CPTII,S$GLB,, | Performed by: PODIATRIST

## 2023-08-23 PROCEDURE — 4010F ACE/ARB THERAPY RXD/TAKEN: CPT | Mod: CPTII,S$GLB,, | Performed by: PODIATRIST

## 2023-08-23 PROCEDURE — 3078F PR MOST RECENT DIASTOLIC BLOOD PRESSURE < 80 MM HG: ICD-10-PCS | Mod: CPTII,S$GLB,, | Performed by: PODIATRIST

## 2023-08-23 PROCEDURE — 3060F POS MICROALBUMINURIA REV: CPT | Mod: CPTII,S$GLB,, | Performed by: PODIATRIST

## 2023-08-23 PROCEDURE — 99213 PR OFFICE/OUTPT VISIT, EST, LEVL III, 20-29 MIN: ICD-10-PCS | Mod: 25,S$GLB,, | Performed by: PODIATRIST

## 2023-08-23 PROCEDURE — 3008F BODY MASS INDEX DOCD: CPT | Mod: CPTII,S$GLB,, | Performed by: PODIATRIST

## 2023-08-23 PROCEDURE — 1159F MED LIST DOCD IN RCRD: CPT | Mod: CPTII,S$GLB,, | Performed by: PODIATRIST

## 2023-08-23 PROCEDURE — 11042 PR DEBRIDEMENT, SKIN, SUB-Q TISSUE,=<20 SQ CM: ICD-10-PCS | Mod: RT,S$GLB,, | Performed by: PODIATRIST

## 2023-08-23 PROCEDURE — 11042 DBRDMT SUBQ TIS 1ST 20SQCM/<: CPT | Mod: RT,S$GLB,, | Performed by: PODIATRIST

## 2023-08-23 PROCEDURE — 3008F PR BODY MASS INDEX (BMI) DOCUMENTED: ICD-10-PCS | Mod: CPTII,S$GLB,, | Performed by: PODIATRIST

## 2023-08-23 PROCEDURE — 99999 PR PBB SHADOW E&M-EST. PATIENT-LVL IV: ICD-10-PCS | Mod: PBBFAC,,, | Performed by: PODIATRIST

## 2023-08-23 PROCEDURE — 3078F DIAST BP <80 MM HG: CPT | Mod: CPTII,S$GLB,, | Performed by: PODIATRIST

## 2023-08-23 PROCEDURE — 3074F PR MOST RECENT SYSTOLIC BLOOD PRESSURE < 130 MM HG: ICD-10-PCS | Mod: CPTII,S$GLB,, | Performed by: PODIATRIST

## 2023-08-23 PROCEDURE — 3052F PR MOST RECENT HEMOGLOBIN A1C LEVEL 8.0 - < 9.0%: ICD-10-PCS | Mod: CPTII,S$GLB,, | Performed by: PODIATRIST

## 2023-08-27 NOTE — PROGRESS NOTES
Subjective:      Patient ID: Billy Rivera is a 58 y.o. male.    Chief Complaint:  Follow-up wound care, right foot.      Billy is a 58 y.o. male who presents to the clinic for evaluation and treatment of high risk feet. Billy has a past medical history of Allergy, CAD (coronary artery disease), native coronary artery (6/25/2013), Cancer, COVID-19 virus detected (9/1/2020), Diabetes mellitus, Diabetes mellitus, type 2, Disorder of kidney and ureter, Heart attack (04/2012), colon cancer, stage I, Hyperlipidemia, Hypertension, Muscular pain, NSTEMI May 2013 - peak troponin 0.22 (5/22/2013), MICHAELA (obstructive sleep apnea), and Retinopathy due to secondary diabetes.  Patient presents for follow-up wound care.  No new complaints. Patient has been in football dressing, ambulating in Darco shoe x 1 week with out issues.        PCP: DRE Baker MD    Date Last Seen by PCP: 3/23/2022    Current shoe gear:  Affected Foot: DARCO Shoe right foot     Affected Foot: Tennis shoe left foot    History of Trauma: negative  Sign of Infection: none        Hemoglobin A1C   Date Value Ref Range Status   07/20/2023 8.7 (H) 4.0 - 5.6 % Final     Comment:     ADA Screening Guidelines:  5.7-6.4%  Consistent with prediabetes  >or=6.5%  Consistent with diabetes    High levels of fetal hemoglobin interfere with the HbA1C  assay. Heterozygous hemoglobin variants (HbS, HgC, etc)do  not significantly interfere with this assay.   However, presence of multiple variants may affect accuracy.     04/06/2023 9.4 (H) 4.0 - 5.6 % Final     Comment:     ADA Screening Guidelines:  5.7-6.4%  Consistent with prediabetes  >or=6.5%  Consistent with diabetes    High levels of fetal hemoglobin interfere with the HbA1C  assay. Heterozygous hemoglobin variants (HbS, HgC, etc)do  not significantly interfere with this assay.   However, presence of multiple variants may affect accuracy.     11/10/2022 7.1 (H) 4.0 - 5.6 % Final     Comment:      "ADA Screening Guidelines:  5.7-6.4%  Consistent with prediabetes  >or=6.5%  Consistent with diabetes    High levels of fetal hemoglobin interfere with the HbA1C  assay. Heterozygous hemoglobin variants (HbS, HgC, etc)do  not significantly interfere with this assay.   However, presence of multiple variants may affect accuracy.                 Objective:      Vitals:    08/23/23 0822   BP: 111/67   Pulse: (!) 55   Weight: 128 kg (282 lb 3 oz)   Height: 6' 2" (1.88 m)   PainSc: 0-No pain   PainLoc: Foot        Physical Exam  Constitutional:       General: He is not in acute distress.     Appearance: Normal appearance. He is well-developed.   HENT:      Head: Normocephalic and atraumatic.   Cardiovascular:      Pulses:           Dorsalis pedis pulses are 2+ on the right side and 2+ on the left side.        Posterior tibial pulses are 2+ on the right side and 2+ on the left side.      Comments: CFT< 3 secs all toes bilateral foot, skin temp warm to cool bilateral foot, no hair growth bilateral lower extremity, no edema to bilateral lower extremities    Pulmonary:      Effort: Pulmonary effort is normal.   Musculoskeletal:         General: No swelling.      Right foot: Normal range of motion. No deformity.      Left foot: Normal range of motion. No deformity.      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     5/5 muscle strength with DF/PF/Inv/Ev bilateral.    Inspection and palpation of the joints and bones reveal no crepitus or joint effusion. No tenderness upon palpation.  Angle and base of gait are normal.    Feet:      Right foot:      Skin integrity: No skin breakdown or erythema.      Left foot:      Skin integrity: No skin breakdown or erythema.   Skin:     General: Skin is warm and dry.      Capillary Refill: Capillary refill takes 2 to 3 seconds.      Findings: No erythema.      Nails: There is no clubbing.      Comments: Ulceration location:  Ulcerations bilateral " "2nd MPJ right foot noted to have an ulceration to the muscle tissue with no sign of infection.  This ulceration measures 0.2 cm in diameter by 0.1 cm in depth, post debridement.   Neurological:      Mental Status: He is alert and oriented to person, place, and time.      Sensory: Sensory deficit present.      Motor: No weakness.      Deep Tendon Reflexes:      Reflex Scores:       Patellar reflexes are 2+ on the right side and 2+ on the left side.       Achilles reflexes are 2+ on the right side and 2+ on the left side.     Comments: Diminished/loss of protective sensation all toes bilateral to 10 gram monofilament.   Psychiatric:         Mood and Affect: Mood normal.         Behavior: Behavior normal.         Thought Content: Thought content normal.               Assessment:       Encounter Diagnoses   Name Primary?    Type II diabetes mellitus with neurological manifestations Yes    Foot ulcer, right, with fat layer exposed                  Plan:       Billy was seen today for wound care.    Diagnoses and all orders for this visit:    Type II diabetes mellitus with neurological manifestations    Foot ulcer, right, with fat layer exposed      Independent review of patients previous clinical notes    Reviewed current medication(s), and lab(s) specific to foot ailment(s) with patient in detail. All questions answered   Decision making:  Chronic illnesses discussed in detail, previous records/notes and imaging independently reviewed, prescription drug management performed in addition to lengthy discussion regarding both conservative and surgical treatment options.    Wound Debridement    Performed by: Mingo Melara DPM   Authorized by: Patient    Time out: Immediately prior to procedure a "time out" was called to verify the correct patient, procedure, equipment, support staff and site/side marked as required.   Consent Done?:  Yes (Verbal)   Local anesthesia used?: No       Wound Details:    Location:  Right " foot     Type of Debridement:  Excisional       Length (cm):  0.2       Width (cm):  0.2       Depth (cm):  0.1       Percent Debrided (%):  100             Depth of debridement:  Subcutaneous tissue    Tissue debrided:  Dermis, Epidermis and Subcutaneous    Devitalized tissue debrided:  Biofilm, Callus and Necrotic/Eschar    Instruments:  Blade, Curette and Nippers     Bleeding:  Minimal  Hemostasis Achieved: Yes    Method Used:  Pressure  Patient tolerance:  Patient tolerated the procedure well with no immediate complications      Dressings: Hydrofera Blue ready  Offloading:Foam      Follow-up:Patient is to return to the clinic in 1 week  for follow-up but should call [facility] immediately if any signs of infection, such as fever, chills, sweats, increased redness or pain.    Short-term goals include maintaining good offloading and minimizing bioburden, promoting granulation and epithelialization to healing.  Long-term goals include keeping the wound healed by good offloading and medical management under the direction of internist.    Follow-up in 2 weeks.

## 2023-08-30 PROBLEM — Z89.421 ACQUIRED ABSENCE OF OTHER RIGHT TOE(S): Status: ACTIVE | Noted: 2023-08-30

## 2023-08-31 ENCOUNTER — OFFICE VISIT (OUTPATIENT)
Dept: URGENT CARE | Facility: CLINIC | Age: 58
End: 2023-08-31
Payer: COMMERCIAL

## 2023-08-31 VITALS
TEMPERATURE: 98 F | BODY MASS INDEX: 36.19 KG/M2 | DIASTOLIC BLOOD PRESSURE: 78 MMHG | RESPIRATION RATE: 18 BRPM | WEIGHT: 282 LBS | SYSTOLIC BLOOD PRESSURE: 134 MMHG | HEIGHT: 74 IN | OXYGEN SATURATION: 96 % | HEART RATE: 78 BPM

## 2023-08-31 DIAGNOSIS — S91.115A LACERATION OF LESSER TOE OF LEFT FOOT WITHOUT FOREIGN BODY PRESENT, NAIL DAMAGE STATUS UNSPECIFIED, INITIAL ENCOUNTER: Primary | ICD-10-CM

## 2023-08-31 DIAGNOSIS — E11.51 DIABETES MELLITUS WITH PERIPHERAL CIRCULATORY DISORDER: ICD-10-CM

## 2023-08-31 PROCEDURE — 99214 OFFICE O/P EST MOD 30 MIN: CPT | Mod: S$GLB,,, | Performed by: FAMILY MEDICINE

## 2023-08-31 PROCEDURE — 99214 PR OFFICE/OUTPT VISIT, EST, LEVL IV, 30-39 MIN: ICD-10-PCS | Mod: S$GLB,,, | Performed by: FAMILY MEDICINE

## 2023-08-31 RX ORDER — LEVOFLOXACIN 500 MG/1
500 TABLET, FILM COATED ORAL DAILY
Qty: 5 TABLET | Refills: 0 | Status: SHIPPED | OUTPATIENT
Start: 2023-08-31 | End: 2023-09-05

## 2023-08-31 NOTE — PROGRESS NOTES
"Subjective:      Patient ID: Billy Rivera is a 58 y.o. male.    Vitals:  height is 6' 2" (1.88 m) and weight is 127.9 kg (282 lb). His oral temperature is 98 °F (36.7 °C). His blood pressure is 134/78 and his pulse is 78. His respiration is 18 and oxygen saturation is 96%.     Chief Complaint: Laceration (Left toe laceration)    This is a 58 y.o. male who presents today with a chief complaint of  left 3rd toe laceration. Pt state he had a callus on top of toe and skin and rip skin back. Last tdap 9/8/2015    Laceration   The incident occurred 1 to 3 hours ago. The laceration is located on the Left foot. The laceration is 2 cm in size. The laceration mechanism is unknown.The pain is at a severity of 2/10. The pain is mild. The pain has been Constant since onset. He reports no foreign bodies present. His tetanus status is out of date.       Skin:  Positive for laceration.      Objective:     Physical Exam   Constitutional: He is oriented to person, place, and time. He appears well-developed. He is cooperative.   HENT:   Head: Normocephalic and atraumatic.   Ears:   Right Ear: Hearing, tympanic membrane, external ear and ear canal normal.   Left Ear: Hearing, tympanic membrane, external ear and ear canal normal.   Nose: Nose normal. No mucosal edema or nasal deformity. No epistaxis. Right sinus exhibits no maxillary sinus tenderness and no frontal sinus tenderness. Left sinus exhibits no maxillary sinus tenderness and no frontal sinus tenderness.   Mouth/Throat: Uvula is midline, oropharynx is clear and moist and mucous membranes are normal. No trismus in the jaw. Normal dentition. No uvula swelling.   Eyes: Conjunctivae and lids are normal.   Neck: Trachea normal and phonation normal. Neck supple.   Cardiovascular: Normal rate, regular rhythm, normal heart sounds and normal pulses.   Pulmonary/Chest: Effort normal and breath sounds normal.   Abdominal: Normal appearance and bowel sounds are normal. Soft. "   Musculoskeletal: Normal range of motion.         General: Normal range of motion.      Left foot: Left 3rd toe: Injuries: abrasion.   Neurological: He is alert and oriented to person, place, and time. He exhibits normal muscle tone.   Skin: Skin is warm, dry and intact.   Psychiatric: His speech is normal and behavior is normal. Judgment and thought content normal.   Nursing note and vitals reviewed.      Assessment:     1. Laceration of lesser toe of left foot without foreign body present, nail damage status unspecified, initial encounter    2. Diabetes mellitus with peripheral circulatory disorder        Plan:       Laceration of lesser toe of left foot without foreign body present, nail damage status unspecified, initial encounter  -     levoFLOXacin (LEVAQUIN) 500 MG tablet; Take 1 tablet (500 mg total) by mouth once daily. for 5 days  Dispense: 5 tablet; Refill: 0    Diabetes mellitus with peripheral circulatory disorder  -     levoFLOXacin (LEVAQUIN) 500 MG tablet; Take 1 tablet (500 mg total) by mouth once daily. for 5 days  Dispense: 5 tablet; Refill: 0          Medical Decision Making:   Urgent Care Management:  Pt with PAD and Dm with previous history of foot ulcers and poor circulation so will add levofloxacin to protect against infection

## 2023-09-06 ENCOUNTER — OFFICE VISIT (OUTPATIENT)
Dept: PODIATRY | Facility: CLINIC | Age: 58
End: 2023-09-06
Payer: COMMERCIAL

## 2023-09-06 VITALS
SYSTOLIC BLOOD PRESSURE: 115 MMHG | HEIGHT: 74 IN | DIASTOLIC BLOOD PRESSURE: 70 MMHG | BODY MASS INDEX: 36.18 KG/M2 | HEART RATE: 61 BPM | WEIGHT: 281.94 LBS

## 2023-09-06 DIAGNOSIS — E11.49 TYPE II DIABETES MELLITUS WITH NEUROLOGICAL MANIFESTATIONS: Primary | ICD-10-CM

## 2023-09-06 DIAGNOSIS — L97.522 FOOT ULCER, LEFT, WITH FAT LAYER EXPOSED: ICD-10-CM

## 2023-09-06 DIAGNOSIS — L97.512 FOOT ULCER, RIGHT, WITH FAT LAYER EXPOSED: ICD-10-CM

## 2023-09-06 PROCEDURE — 3066F PR DOCUMENTATION OF TREATMENT FOR NEPHROPATHY: ICD-10-PCS | Mod: CPTII,S$GLB,, | Performed by: PODIATRIST

## 2023-09-06 PROCEDURE — 99499 UNLISTED E&M SERVICE: CPT | Mod: S$GLB,,, | Performed by: PODIATRIST

## 2023-09-06 PROCEDURE — 3074F SYST BP LT 130 MM HG: CPT | Mod: CPTII,S$GLB,, | Performed by: PODIATRIST

## 2023-09-06 PROCEDURE — 3074F PR MOST RECENT SYSTOLIC BLOOD PRESSURE < 130 MM HG: ICD-10-PCS | Mod: CPTII,S$GLB,, | Performed by: PODIATRIST

## 2023-09-06 PROCEDURE — 4010F PR ACE/ARB THEARPY RXD/TAKEN: ICD-10-PCS | Mod: CPTII,S$GLB,, | Performed by: PODIATRIST

## 2023-09-06 PROCEDURE — 3060F PR POS MICROALBUMINURIA RESULT DOCUMENTED/REVIEW: ICD-10-PCS | Mod: CPTII,S$GLB,, | Performed by: PODIATRIST

## 2023-09-06 PROCEDURE — 4010F ACE/ARB THERAPY RXD/TAKEN: CPT | Mod: CPTII,S$GLB,, | Performed by: PODIATRIST

## 2023-09-06 PROCEDURE — 3066F NEPHROPATHY DOC TX: CPT | Mod: CPTII,S$GLB,, | Performed by: PODIATRIST

## 2023-09-06 PROCEDURE — 99999 PR PBB SHADOW E&M-EST. PATIENT-LVL IV: CPT | Mod: PBBFAC,,, | Performed by: PODIATRIST

## 2023-09-06 PROCEDURE — 3060F POS MICROALBUMINURIA REV: CPT | Mod: CPTII,S$GLB,, | Performed by: PODIATRIST

## 2023-09-06 PROCEDURE — 3052F HG A1C>EQUAL 8.0%<EQUAL 9.0%: CPT | Mod: CPTII,S$GLB,, | Performed by: PODIATRIST

## 2023-09-06 PROCEDURE — 11042 DBRDMT SUBQ TIS 1ST 20SQCM/<: CPT | Mod: S$GLB,,, | Performed by: PODIATRIST

## 2023-09-06 PROCEDURE — 3078F DIAST BP <80 MM HG: CPT | Mod: CPTII,S$GLB,, | Performed by: PODIATRIST

## 2023-09-06 PROCEDURE — 99999 PR PBB SHADOW E&M-EST. PATIENT-LVL IV: ICD-10-PCS | Mod: PBBFAC,,, | Performed by: PODIATRIST

## 2023-09-06 PROCEDURE — 3008F PR BODY MASS INDEX (BMI) DOCUMENTED: ICD-10-PCS | Mod: CPTII,S$GLB,, | Performed by: PODIATRIST

## 2023-09-06 PROCEDURE — 11042 PR DEBRIDEMENT, SKIN, SUB-Q TISSUE,=<20 SQ CM: ICD-10-PCS | Mod: S$GLB,,, | Performed by: PODIATRIST

## 2023-09-06 PROCEDURE — 3052F PR MOST RECENT HEMOGLOBIN A1C LEVEL 8.0 - < 9.0%: ICD-10-PCS | Mod: CPTII,S$GLB,, | Performed by: PODIATRIST

## 2023-09-06 PROCEDURE — 3078F PR MOST RECENT DIASTOLIC BLOOD PRESSURE < 80 MM HG: ICD-10-PCS | Mod: CPTII,S$GLB,, | Performed by: PODIATRIST

## 2023-09-06 PROCEDURE — 99499 NO LOS: ICD-10-PCS | Mod: S$GLB,,, | Performed by: PODIATRIST

## 2023-09-06 PROCEDURE — 3008F BODY MASS INDEX DOCD: CPT | Mod: CPTII,S$GLB,, | Performed by: PODIATRIST

## 2023-09-20 ENCOUNTER — OFFICE VISIT (OUTPATIENT)
Dept: PODIATRY | Facility: CLINIC | Age: 58
End: 2023-09-20
Payer: COMMERCIAL

## 2023-09-20 VITALS
WEIGHT: 281.94 LBS | SYSTOLIC BLOOD PRESSURE: 146 MMHG | BODY MASS INDEX: 36.18 KG/M2 | HEART RATE: 63 BPM | HEIGHT: 74 IN | DIASTOLIC BLOOD PRESSURE: 83 MMHG

## 2023-09-20 DIAGNOSIS — L97.512 FOOT ULCER, RIGHT, WITH FAT LAYER EXPOSED: ICD-10-CM

## 2023-09-20 DIAGNOSIS — L97.522 FOOT ULCER, LEFT, WITH FAT LAYER EXPOSED: ICD-10-CM

## 2023-09-20 DIAGNOSIS — E11.49 TYPE II DIABETES MELLITUS WITH NEUROLOGICAL MANIFESTATIONS: Primary | ICD-10-CM

## 2023-09-20 DIAGNOSIS — I25.10 CORONARY ARTERY DISEASE INVOLVING NATIVE CORONARY ARTERY OF NATIVE HEART WITHOUT ANGINA PECTORIS: ICD-10-CM

## 2023-09-20 PROCEDURE — 3077F SYST BP >= 140 MM HG: CPT | Mod: CPTII,S$GLB,, | Performed by: PODIATRIST

## 2023-09-20 PROCEDURE — 3060F PR POS MICROALBUMINURIA RESULT DOCUMENTED/REVIEW: ICD-10-PCS | Mod: CPTII,S$GLB,, | Performed by: PODIATRIST

## 2023-09-20 PROCEDURE — 99213 OFFICE O/P EST LOW 20 MIN: CPT | Mod: 25,S$GLB,, | Performed by: PODIATRIST

## 2023-09-20 PROCEDURE — 3079F DIAST BP 80-89 MM HG: CPT | Mod: CPTII,S$GLB,, | Performed by: PODIATRIST

## 2023-09-20 PROCEDURE — 99999 PR PBB SHADOW E&M-EST. PATIENT-LVL III: CPT | Mod: PBBFAC,,, | Performed by: PODIATRIST

## 2023-09-20 PROCEDURE — 3008F BODY MASS INDEX DOCD: CPT | Mod: CPTII,S$GLB,, | Performed by: PODIATRIST

## 2023-09-20 PROCEDURE — 4010F ACE/ARB THERAPY RXD/TAKEN: CPT | Mod: CPTII,S$GLB,, | Performed by: PODIATRIST

## 2023-09-20 PROCEDURE — 1159F PR MEDICATION LIST DOCUMENTED IN MEDICAL RECORD: ICD-10-PCS | Mod: CPTII,S$GLB,, | Performed by: PODIATRIST

## 2023-09-20 PROCEDURE — 3077F PR MOST RECENT SYSTOLIC BLOOD PRESSURE >= 140 MM HG: ICD-10-PCS | Mod: CPTII,S$GLB,, | Performed by: PODIATRIST

## 2023-09-20 PROCEDURE — 99213 PR OFFICE/OUTPT VISIT, EST, LEVL III, 20-29 MIN: ICD-10-PCS | Mod: 25,S$GLB,, | Performed by: PODIATRIST

## 2023-09-20 PROCEDURE — 3066F PR DOCUMENTATION OF TREATMENT FOR NEPHROPATHY: ICD-10-PCS | Mod: CPTII,S$GLB,, | Performed by: PODIATRIST

## 2023-09-20 PROCEDURE — 3052F HG A1C>EQUAL 8.0%<EQUAL 9.0%: CPT | Mod: CPTII,S$GLB,, | Performed by: PODIATRIST

## 2023-09-20 PROCEDURE — 3052F PR MOST RECENT HEMOGLOBIN A1C LEVEL 8.0 - < 9.0%: ICD-10-PCS | Mod: CPTII,S$GLB,, | Performed by: PODIATRIST

## 2023-09-20 PROCEDURE — 4010F PR ACE/ARB THEARPY RXD/TAKEN: ICD-10-PCS | Mod: CPTII,S$GLB,, | Performed by: PODIATRIST

## 2023-09-20 PROCEDURE — 3079F PR MOST RECENT DIASTOLIC BLOOD PRESSURE 80-89 MM HG: ICD-10-PCS | Mod: CPTII,S$GLB,, | Performed by: PODIATRIST

## 2023-09-20 PROCEDURE — 3066F NEPHROPATHY DOC TX: CPT | Mod: CPTII,S$GLB,, | Performed by: PODIATRIST

## 2023-09-20 PROCEDURE — 3008F PR BODY MASS INDEX (BMI) DOCUMENTED: ICD-10-PCS | Mod: CPTII,S$GLB,, | Performed by: PODIATRIST

## 2023-09-20 PROCEDURE — 99999 PR PBB SHADOW E&M-EST. PATIENT-LVL III: ICD-10-PCS | Mod: PBBFAC,,, | Performed by: PODIATRIST

## 2023-09-20 PROCEDURE — 1159F MED LIST DOCD IN RCRD: CPT | Mod: CPTII,S$GLB,, | Performed by: PODIATRIST

## 2023-09-20 PROCEDURE — 3060F POS MICROALBUMINURIA REV: CPT | Mod: CPTII,S$GLB,, | Performed by: PODIATRIST

## 2023-09-20 PROCEDURE — 11042 DBRDMT SUBQ TIS 1ST 20SQCM/<: CPT | Mod: S$GLB,,, | Performed by: PODIATRIST

## 2023-09-20 PROCEDURE — 11042 PR DEBRIDEMENT, SKIN, SUB-Q TISSUE,=<20 SQ CM: ICD-10-PCS | Mod: S$GLB,,, | Performed by: PODIATRIST

## 2023-09-20 NOTE — PROGRESS NOTES
Subjective:      Patient ID: Billy Rivera is a 58 y.o. male.    Chief Complaint:  Follow-up wound care, right foot.      Billy is a 58 y.o. male who presents to the clinic for evaluation and treatment of high risk feet. Billy has a past medical history of Allergy, CAD (coronary artery disease), native coronary artery (6/25/2013), Cancer, COVID-19 virus detected (9/1/2020), Diabetes mellitus, Diabetes mellitus, type 2, Disorder of kidney and ureter, Heart attack (04/2012), colon cancer, stage I, Hyperlipidemia, Hypertension, Muscular pain, NSTEMI May 2013 - peak troponin 0.22 (5/22/2013), MICHAELA (obstructive sleep apnea), and Retinopathy due to secondary diabetes.  Patient presents for follow-up wound care.  New issue of a left 5th toe ulceration secondary to trauma/hitting on chair.    PCP: DRE Baker MD    Date Last Seen by PCP: 3/23/2022    Current shoe gear:  Affected Foot: DARCO Shoe right foot     Affected Foot: Tennis shoe left foot    History of Trauma: negative  Sign of Infection: none        Hemoglobin A1C   Date Value Ref Range Status   07/20/2023 8.7 (H) 4.0 - 5.6 % Final     Comment:     ADA Screening Guidelines:  5.7-6.4%  Consistent with prediabetes  >or=6.5%  Consistent with diabetes    High levels of fetal hemoglobin interfere with the HbA1C  assay. Heterozygous hemoglobin variants (HbS, HgC, etc)do  not significantly interfere with this assay.   However, presence of multiple variants may affect accuracy.     04/06/2023 9.4 (H) 4.0 - 5.6 % Final     Comment:     ADA Screening Guidelines:  5.7-6.4%  Consistent with prediabetes  >or=6.5%  Consistent with diabetes    High levels of fetal hemoglobin interfere with the HbA1C  assay. Heterozygous hemoglobin variants (HbS, HgC, etc)do  not significantly interfere with this assay.   However, presence of multiple variants may affect accuracy.     11/10/2022 7.1 (H) 4.0 - 5.6 % Final     Comment:     ADA Screening Guidelines:  5.7-6.4%   "Consistent with prediabetes  >or=6.5%  Consistent with diabetes    High levels of fetal hemoglobin interfere with the HbA1C  assay. Heterozygous hemoglobin variants (HbS, HgC, etc)do  not significantly interfere with this assay.   However, presence of multiple variants may affect accuracy.                 Objective:      Vitals:    09/20/23 0944   BP: (!) 146/83   Pulse: 63   Weight: 127.9 kg (281 lb 15.5 oz)   Height: 6' 2" (1.88 m)   PainSc: 0-No pain   PainLoc: Foot        Physical Exam  Constitutional:       General: He is not in acute distress.     Appearance: Normal appearance. He is well-developed.   HENT:      Head: Normocephalic and atraumatic.   Cardiovascular:      Pulses:           Dorsalis pedis pulses are 2+ on the right side and 2+ on the left side.        Posterior tibial pulses are 2+ on the right side and 2+ on the left side.      Comments: CFT< 3 secs all toes bilateral foot, skin temp warm to cool bilateral foot, no hair growth bilateral lower extremity, no edema to bilateral lower extremities    Pulmonary:      Effort: Pulmonary effort is normal.   Musculoskeletal:         General: No swelling.      Right foot: Normal range of motion. No deformity.      Left foot: Normal range of motion. No deformity.      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     5/5 muscle strength with DF/PF/Inv/Ev bilateral.    Inspection and palpation of the joints and bones reveal no crepitus or joint effusion. No tenderness upon palpation.  Angle and base of gait are normal.    Feet:      Right foot:      Skin integrity: No skin breakdown or erythema.      Left foot:      Skin integrity: No skin breakdown or erythema.   Skin:     General: Skin is warm and dry.      Capillary Refill: Capillary refill takes 2 to 3 seconds.      Findings: No erythema.      Nails: There is no clubbing.      Comments: Ulceration location:  Left 5th digit measuring 0.5 cm in diameter by 0.1 cm " in depth to subcutaneous tissue post debridement.  Right sub 5th MPJ ulceration measuring 0.2 cm in diameter by 0.1 cm in depth to subcutaneous tissue post debridement.   Neurological:      Mental Status: He is alert and oriented to person, place, and time.      Sensory: Sensory deficit present.      Motor: No weakness.      Deep Tendon Reflexes:      Reflex Scores:       Patellar reflexes are 2+ on the right side and 2+ on the left side.       Achilles reflexes are 2+ on the right side and 2+ on the left side.     Comments: Diminished/loss of protective sensation all toes bilateral to 10 gram monofilament.   Psychiatric:         Mood and Affect: Mood normal.         Behavior: Behavior normal.         Thought Content: Thought content normal.               Assessment:       Encounter Diagnoses   Name Primary?    Type II diabetes mellitus with neurological manifestations Yes    Foot ulcer, right, with fat layer exposed     Foot ulcer, left, with fat layer exposed     Coronary artery disease involving native coronary artery of native heart without angina pectoris                  Plan:       Billy was seen today for wound care.    Diagnoses and all orders for this visit:    Type II diabetes mellitus with neurological manifestations    Foot ulcer, right, with fat layer exposed    Foot ulcer, left, with fat layer exposed    Coronary artery disease involving native coronary artery of native heart without angina pectoris      Independent review of patients previous clinical notes    Reviewed current medication(s), and lab(s) specific to foot ailment(s) with patient in detail. All questions answered   Decision making:  Chronic illnesses discussed in detail, previous records/notes and imaging independently reviewed, prescription drug management performed in addition to lengthy discussion regarding both conservative and surgical treatment options.    Wound Debridement    Performed by: Mingo Melara DPM   Authorized by:  "Patient    Time out: Immediately prior to procedure a "time out" was called to verify the correct patient, procedure, equipment, support staff and site/side marked as required.   Consent Done?:  Yes (Verbal)   Local anesthesia used?: No       Wound Details:    Location:  Right foot     Type of Debridement:  Excisional       Length (cm):  0.2       Width (cm):  0.2       Depth (cm):  0.1       Percent Debrided (%):  100             Depth of debridement:  Subcutaneous tissue    Tissue debrided:  Dermis, Epidermis and Subcutaneous    Devitalized tissue debrided:  Biofilm, Callus and Necrotic/Eschar    Instruments:  Blade, Curette and Nippers     Bleeding:  Minimal  Hemostasis Achieved: Yes    Method Used:  Pressure  Patient tolerance:  Patient tolerated the procedure well with no immediate complications      Dressings: Hydrofera Blue ready  Offloading:Foam      Follow-up:Patient is to return to the clinic in 1 week  for follow-up but should call [facility] immediately if any signs of infection, such as fever, chills, sweats, increased redness or pain.    Short-term goals include maintaining good offloading and minimizing bioburden, promoting granulation and epithelialization to healing.  Long-term goals include keeping the wound healed by good offloading and medical management under the direction of internist.    Follow-up in 2 weeks.                  "

## 2023-09-27 ENCOUNTER — HOSPITAL ENCOUNTER (OUTPATIENT)
Dept: CARDIOLOGY | Facility: HOSPITAL | Age: 58
Discharge: HOME | End: 2023-09-27
Attending: INTERNAL MEDICINE
Payer: COMMERCIAL

## 2023-09-27 VITALS
HEIGHT: 74 IN | HEART RATE: 67 BPM | DIASTOLIC BLOOD PRESSURE: 70 MMHG | WEIGHT: 281 LBS | SYSTOLIC BLOOD PRESSURE: 120 MMHG | BODY MASS INDEX: 36.06 KG/M2

## 2023-09-27 DIAGNOSIS — I25.10 CORONARY ARTERY DISEASE INVOLVING NATIVE CORONARY ARTERY OF NATIVE HEART WITHOUT ANGINA PECTORIS: ICD-10-CM

## 2023-09-27 LAB
ASCENDING AORTA: 3.32 CM
AV INDEX (PROSTH): 0.82
AV MEAN GRADIENT: 3 MMHG
AV PEAK GRADIENT: 7 MMHG
AV VALVE AREA BY VELOCITY RATIO: 3.15 CM²
AV VALVE AREA: 3.08 CM²
AV VELOCITY RATIO: 0.84
BSA FOR ECHO PROCEDURE: 2.58 M2
CV ECHO LV RWT: 0.57 CM
DOP CALC AO PEAK VEL: 1.28 M/S
DOP CALC AO VTI: 32.44 CM
DOP CALC LVOT AREA: 3.7 CM2
DOP CALC LVOT DIAMETER: 2.18 CM
DOP CALC LVOT PEAK VEL: 1.08 M/S
DOP CALC LVOT STROKE VOLUME: 99.83 CM3
DOP CALC MV VTI: 37.83 CM
DOP CALCLVOT PEAK VEL VTI: 26.76 CM
E WAVE DECELERATION TIME: 263.11 MSEC
E/A RATIO: 1.27
E/E' RATIO: 13.5 M/S
ECHO LV POSTERIOR WALL: 1.3 CM (ref 0.6–1.1)
FRACTIONAL SHORTENING: 37 % (ref 28–44)
INTERVENTRICULAR SEPTUM: 1.4 CM (ref 0.6–1.1)
LA MAJOR: 4.56 CM
LA MINOR: 4.56 CM
LA WIDTH: 3.4 CM
LEFT ATRIUM SIZE: 4.31 CM
LEFT ATRIUM VOLUME INDEX MOD: 14.5 ML/M2
LEFT ATRIUM VOLUME INDEX: 22.6 ML/M2
LEFT ATRIUM VOLUME MOD: 36.46 CM3
LEFT ATRIUM VOLUME: 56.8 CM3
LEFT INTERNAL DIMENSION IN SYSTOLE: 2.91 CM (ref 2.1–4)
LEFT VENTRICLE DIASTOLIC VOLUME INDEX: 38.84 ML/M2
LEFT VENTRICLE DIASTOLIC VOLUME: 97.48 ML
LEFT VENTRICLE MASS INDEX: 97 G/M2
LEFT VENTRICLE SYSTOLIC VOLUME INDEX: 12.9 ML/M2
LEFT VENTRICLE SYSTOLIC VOLUME: 32.41 ML
LEFT VENTRICULAR INTERNAL DIMENSION IN DIASTOLE: 4.6 CM (ref 3.5–6)
LEFT VENTRICULAR MASS: 243.28 G
LV LATERAL E/E' RATIO: 12 M/S
LV SEPTAL E/E' RATIO: 15.43 M/S
MV A" WAVE DURATION": 14.84 MSEC
MV MEAN GRADIENT: 2 MMHG
MV PEAK A VEL: 0.85 M/S
MV PEAK E VEL: 1.08 M/S
MV PEAK GRADIENT: 4 MMHG
MV VALVE AREA BY CONTINUITY EQUATION: 2.64 CM2
PISA TR MAX VEL: 1.91 M/S
PULM VEIN S/D RATIO: 0.91
PV PEAK D VEL: 0.44 M/S
PV PEAK S VEL: 0.4 M/S
RA MAJOR: 4.08 CM
RA PRESSURE ESTIMATED: 3 MMHG
RA WIDTH: 3.12 CM
RIGHT VENTRICULAR END-DIASTOLIC DIMENSION: 3.28 CM
RV TB RVSP: 5 MMHG
SINUS: 3.5 CM
STJ: 3.34 CM
TDI LATERAL: 0.09 M/S
TDI SEPTAL: 0.07 M/S
TDI: 0.08 M/S
TR MAX PG: 15 MMHG
TV REST PULMONARY ARTERY PRESSURE: 18 MMHG
Z-SCORE OF LEFT VENTRICULAR DIMENSION IN END DIASTOLE: -11.34
Z-SCORE OF LEFT VENTRICULAR DIMENSION IN END SYSTOLE: -8.45

## 2023-09-27 PROCEDURE — 93306 TTE W/DOPPLER COMPLETE: CPT

## 2023-09-27 PROCEDURE — 93306 ECHO (CUPID ONLY): ICD-10-PCS | Mod: 26,,, | Performed by: INTERNAL MEDICINE

## 2023-09-27 PROCEDURE — 93306 TTE W/DOPPLER COMPLETE: CPT | Mod: 26,,, | Performed by: INTERNAL MEDICINE

## 2023-09-28 NOTE — PROGRESS NOTES
Subjective:      Patient ID: Billy Rivera is a 58 y.o. male.    Chief Complaint:  Follow-up wound care, right foot.      Billy is a 58 y.o. male who presents to the clinic for evaluation and treatment of high risk feet. Billy has a past medical history of Allergy, CAD (coronary artery disease), native coronary artery (6/25/2013), Cancer, COVID-19 virus detected (9/1/2020), Diabetes mellitus, Diabetes mellitus, type 2, Disorder of kidney and ureter, Heart attack (04/2012), colon cancer, stage I, Hyperlipidemia, Hypertension, Muscular pain, NSTEMI May 2013 - peak troponin 0.22 (5/22/2013), MICHAELA (obstructive sleep apnea), and Retinopathy due to secondary diabetes.  Patient presents for follow-up wound care.  No new complaints. Patient has been in football dressing, ambulating in Darco shoe x 1 week with out issues.        PCP: DRE Baker MD    Date Last Seen by PCP:   Chief Complaint   Patient presents with    Wound Care     Right foot and cut toe on left foot         Current shoe gear:  Affected Foot: DARCO Shoe right foot     Affected Foot: Tennis shoe left foot    History of Trauma: negative  Sign of Infection: none        Hemoglobin A1C   Date Value Ref Range Status   07/20/2023 8.7 (H) 4.0 - 5.6 % Final     Comment:     ADA Screening Guidelines:  5.7-6.4%  Consistent with prediabetes  >or=6.5%  Consistent with diabetes    High levels of fetal hemoglobin interfere with the HbA1C  assay. Heterozygous hemoglobin variants (HbS, HgC, etc)do  not significantly interfere with this assay.   However, presence of multiple variants may affect accuracy.     04/06/2023 9.4 (H) 4.0 - 5.6 % Final     Comment:     ADA Screening Guidelines:  5.7-6.4%  Consistent with prediabetes  >or=6.5%  Consistent with diabetes    High levels of fetal hemoglobin interfere with the HbA1C  assay. Heterozygous hemoglobin variants (HbS, HgC, etc)do  not significantly interfere with this assay.   However, presence of multiple  "variants may affect accuracy.     11/10/2022 7.1 (H) 4.0 - 5.6 % Final     Comment:     ADA Screening Guidelines:  5.7-6.4%  Consistent with prediabetes  >or=6.5%  Consistent with diabetes    High levels of fetal hemoglobin interfere with the HbA1C  assay. Heterozygous hemoglobin variants (HbS, HgC, etc)do  not significantly interfere with this assay.   However, presence of multiple variants may affect accuracy.                 Objective:      Vitals:    09/06/23 0955   BP: 115/70   Pulse: 61   Weight: 127.9 kg (281 lb 15.5 oz)   Height: 6' 2" (1.88 m)   PainSc: 0-No pain   PainLoc: Foot        Physical Exam  Constitutional:       General: He is not in acute distress.     Appearance: Normal appearance. He is well-developed.   HENT:      Head: Normocephalic and atraumatic.   Cardiovascular:      Pulses:           Dorsalis pedis pulses are 2+ on the right side and 2+ on the left side.        Posterior tibial pulses are 2+ on the right side and 2+ on the left side.      Comments: CFT< 3 secs all toes bilateral foot, skin temp warm to cool bilateral foot, no hair growth bilateral lower extremity, no edema to bilateral lower extremities    Pulmonary:      Effort: Pulmonary effort is normal.   Musculoskeletal:         General: No swelling.      Right foot: Normal range of motion. No deformity.      Left foot: Normal range of motion. No deformity.      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     5/5 muscle strength with DF/PF/Inv/Ev bilateral.    Inspection and palpation of the joints and bones reveal no crepitus or joint effusion. No tenderness upon palpation.  Angle and base of gait are normal.    Feet:      Right foot:      Skin integrity: No skin breakdown or erythema.      Left foot:      Skin integrity: No skin breakdown or erythema.   Skin:     General: Skin is warm and dry.      Capillary Refill: Capillary refill takes 2 to 3 seconds.      Findings: No erythema.      " Nails: There is no clubbing.   Neurological:      Mental Status: He is alert and oriented to person, place, and time.      Sensory: Sensory deficit present.      Motor: No weakness.      Deep Tendon Reflexes:      Reflex Scores:       Patellar reflexes are 2+ on the right side and 2+ on the left side.       Achilles reflexes are 2+ on the right side and 2+ on the left side.     Comments: Diminished/loss of protective sensation all toes bilateral to 10 gram monofilament.   Psychiatric:         Mood and Affect: Mood normal.         Behavior: Behavior normal.         Thought Content: Thought content normal.         R foot 0.6x0.5x0.1    L 5th toe    L foot  0.6x0.3x0.1cm          Assessment:       Encounter Diagnoses   Name Primary?    Type II diabetes mellitus with neurological manifestations Yes    Foot ulcer, right, with fat layer exposed     Foot ulcer, left, with fat layer exposed          Plan:       Billy was seen today for wound care.    Diagnoses and all orders for this visit:    Type II diabetes mellitus with neurological manifestations    Foot ulcer, right, with fat layer exposed    Foot ulcer, left, with fat layer exposed      Independent review of patients previous clinical notes  Reviewed current medication(s), and lab(s) specific to foot ailment(s) with patient in detail. All questions answered   New wound contra lateral foot   Pt ok to resume b/l football   Debridement: With verbal consent, nonviable tissues on the bilateral foot were debrided beyond sub q utilizing a  sterile No. 3 scalpel and forceps. Minimal bleeding controlled with direct pressure  The patient tolerated this well.     Dressings: Leticia  Offloading:Ibis TEJEDA MA assisted w/ application of football dressing under my direct supervision. Darco shoe applied to offload the area. Advised patient that this should be worn on the affected foot at all times when ambulating     Follow-up:Patient is to return to the clinic in 1 week   for follow-up but [facility] if any signs of infection, such as fever, chills, sweats, increased redness or pain.    Short-term goals include maintaining good offloading and minimizing bioburden, promoting granulation and epithelialization to healing.  Long-term goals include keeping the wound healed by good offloading and medical management under the direction of internist.

## 2023-10-04 ENCOUNTER — OFFICE VISIT (OUTPATIENT)
Dept: PODIATRY | Facility: CLINIC | Age: 58
End: 2023-10-04
Payer: COMMERCIAL

## 2023-10-04 ENCOUNTER — PATIENT OUTREACH (OUTPATIENT)
Dept: ADMINISTRATIVE | Facility: HOSPITAL | Age: 58
End: 2023-10-04
Payer: COMMERCIAL

## 2023-10-04 VITALS
HEART RATE: 79 BPM | SYSTOLIC BLOOD PRESSURE: 164 MMHG | DIASTOLIC BLOOD PRESSURE: 90 MMHG | BODY MASS INDEX: 36.07 KG/M2 | WEIGHT: 281.06 LBS | HEIGHT: 74 IN

## 2023-10-04 DIAGNOSIS — L97.512 FOOT ULCER, RIGHT, WITH FAT LAYER EXPOSED: ICD-10-CM

## 2023-10-04 DIAGNOSIS — L97.522 FOOT ULCER, LEFT, WITH FAT LAYER EXPOSED: ICD-10-CM

## 2023-10-04 DIAGNOSIS — E11.49 TYPE II DIABETES MELLITUS WITH NEUROLOGICAL MANIFESTATIONS: Primary | ICD-10-CM

## 2023-10-04 PROCEDURE — 4010F ACE/ARB THERAPY RXD/TAKEN: CPT | Mod: CPTII,S$GLB,, | Performed by: PODIATRIST

## 2023-10-04 PROCEDURE — 3008F BODY MASS INDEX DOCD: CPT | Mod: CPTII,S$GLB,, | Performed by: PODIATRIST

## 2023-10-04 PROCEDURE — 3060F PR POS MICROALBUMINURIA RESULT DOCUMENTED/REVIEW: ICD-10-PCS | Mod: CPTII,S$GLB,, | Performed by: PODIATRIST

## 2023-10-04 PROCEDURE — 3080F DIAST BP >= 90 MM HG: CPT | Mod: CPTII,S$GLB,, | Performed by: PODIATRIST

## 2023-10-04 PROCEDURE — 1159F PR MEDICATION LIST DOCUMENTED IN MEDICAL RECORD: ICD-10-PCS | Mod: CPTII,S$GLB,, | Performed by: PODIATRIST

## 2023-10-04 PROCEDURE — 3052F HG A1C>EQUAL 8.0%<EQUAL 9.0%: CPT | Mod: CPTII,S$GLB,, | Performed by: PODIATRIST

## 2023-10-04 PROCEDURE — 3052F PR MOST RECENT HEMOGLOBIN A1C LEVEL 8.0 - < 9.0%: ICD-10-PCS | Mod: CPTII,S$GLB,, | Performed by: PODIATRIST

## 2023-10-04 PROCEDURE — 3008F PR BODY MASS INDEX (BMI) DOCUMENTED: ICD-10-PCS | Mod: CPTII,S$GLB,, | Performed by: PODIATRIST

## 2023-10-04 PROCEDURE — 99999 PR PBB SHADOW E&M-EST. PATIENT-LVL IV: CPT | Mod: PBBFAC,,, | Performed by: PODIATRIST

## 2023-10-04 PROCEDURE — 11042 DBRDMT SUBQ TIS 1ST 20SQCM/<: CPT | Mod: RT,S$GLB,, | Performed by: PODIATRIST

## 2023-10-04 PROCEDURE — 3066F PR DOCUMENTATION OF TREATMENT FOR NEPHROPATHY: ICD-10-PCS | Mod: CPTII,S$GLB,, | Performed by: PODIATRIST

## 2023-10-04 PROCEDURE — 99213 PR OFFICE/OUTPT VISIT, EST, LEVL III, 20-29 MIN: ICD-10-PCS | Mod: 25,S$GLB,, | Performed by: PODIATRIST

## 2023-10-04 PROCEDURE — 99213 OFFICE O/P EST LOW 20 MIN: CPT | Mod: 25,S$GLB,, | Performed by: PODIATRIST

## 2023-10-04 PROCEDURE — 4010F PR ACE/ARB THEARPY RXD/TAKEN: ICD-10-PCS | Mod: CPTII,S$GLB,, | Performed by: PODIATRIST

## 2023-10-04 PROCEDURE — 3066F NEPHROPATHY DOC TX: CPT | Mod: CPTII,S$GLB,, | Performed by: PODIATRIST

## 2023-10-04 PROCEDURE — 3077F SYST BP >= 140 MM HG: CPT | Mod: CPTII,S$GLB,, | Performed by: PODIATRIST

## 2023-10-04 PROCEDURE — 3080F PR MOST RECENT DIASTOLIC BLOOD PRESSURE >= 90 MM HG: ICD-10-PCS | Mod: CPTII,S$GLB,, | Performed by: PODIATRIST

## 2023-10-04 PROCEDURE — 11042 PR DEBRIDEMENT, SKIN, SUB-Q TISSUE,=<20 SQ CM: ICD-10-PCS | Mod: RT,S$GLB,, | Performed by: PODIATRIST

## 2023-10-04 PROCEDURE — 1159F MED LIST DOCD IN RCRD: CPT | Mod: CPTII,S$GLB,, | Performed by: PODIATRIST

## 2023-10-04 PROCEDURE — 3060F POS MICROALBUMINURIA REV: CPT | Mod: CPTII,S$GLB,, | Performed by: PODIATRIST

## 2023-10-04 PROCEDURE — 99999 PR PBB SHADOW E&M-EST. PATIENT-LVL IV: ICD-10-PCS | Mod: PBBFAC,,, | Performed by: PODIATRIST

## 2023-10-04 PROCEDURE — 3077F PR MOST RECENT SYSTOLIC BLOOD PRESSURE >= 140 MM HG: ICD-10-PCS | Mod: CPTII,S$GLB,, | Performed by: PODIATRIST

## 2023-10-04 NOTE — PROGRESS NOTES
Health Maintenance Due   Topic Date Due    Eye Exam  08/15/2020    Shingles Vaccine (2 of 2) 11/23/2020    COVID-19 Vaccine (3 - Moderna series) 06/23/2021    PROSTATE-SPECIFIC ANTIGEN  01/26/2023    Influenza Vaccine (1) 09/01/2023     Chart reviewed.   Immunizations: Reconciled  Orders placed: N/A  Upcoming appts to satisfy [facility] topics: N/A

## 2023-10-12 NOTE — PROGRESS NOTES
Subjective:      Patient ID: Billy Rivera is a 58 y.o. male.    Chief Complaint:  Follow-up wound care, right foot.      Billy is a 58 y.o. male who presents to the clinic for evaluation and treatment of high risk feet. Billy has a past medical history of Allergy, CAD (coronary artery disease), native coronary artery (6/25/2013), Cancer, COVID-19 virus detected (9/1/2020), Diabetes mellitus, Diabetes mellitus, type 2, Disorder of kidney and ureter, Heart attack (04/2012), colon cancer, stage I, Hyperlipidemia, Hypertension, Muscular pain, NSTEMI May 2013 - peak troponin 0.22 (5/22/2013), MICHAELA (obstructive sleep apnea), and Retinopathy due to secondary diabetes.  Patient presents for follow-up wound care.      PCP: DRE Baker MD    Date Last Seen by PCP: 3/23/2022    Current shoe gear:  Affected Foot: DARCO Shoe right foot     Affected Foot: Tennis shoe left foot    History of Trauma: negative  Sign of Infection: none        Hemoglobin A1C   Date Value Ref Range Status   07/20/2023 8.7 (H) 4.0 - 5.6 % Final     Comment:     ADA Screening Guidelines:  5.7-6.4%  Consistent with prediabetes  >or=6.5%  Consistent with diabetes    High levels of fetal hemoglobin interfere with the HbA1C  assay. Heterozygous hemoglobin variants (HbS, HgC, etc)do  not significantly interfere with this assay.   However, presence of multiple variants may affect accuracy.     04/06/2023 9.4 (H) 4.0 - 5.6 % Final     Comment:     ADA Screening Guidelines:  5.7-6.4%  Consistent with prediabetes  >or=6.5%  Consistent with diabetes    High levels of fetal hemoglobin interfere with the HbA1C  assay. Heterozygous hemoglobin variants (HbS, HgC, etc)do  not significantly interfere with this assay.   However, presence of multiple variants may affect accuracy.     11/10/2022 7.1 (H) 4.0 - 5.6 % Final     Comment:     ADA Screening Guidelines:  5.7-6.4%  Consistent with prediabetes  >or=6.5%  Consistent with diabetes    High  "levels of fetal hemoglobin interfere with the HbA1C  assay. Heterozygous hemoglobin variants (HbS, HgC, etc)do  not significantly interfere with this assay.   However, presence of multiple variants may affect accuracy.                 Objective:      Vitals:    10/04/23 0843   BP: (!) 164/90   Pulse: 79   Weight: 127.5 kg (281 lb 1.4 oz)   Height: 6' 2" (1.88 m)   PainSc: 0-No pain        Physical Exam  Constitutional:       General: He is not in acute distress.     Appearance: Normal appearance. He is well-developed.   HENT:      Head: Normocephalic and atraumatic.   Cardiovascular:      Pulses:           Dorsalis pedis pulses are 2+ on the right side and 2+ on the left side.        Posterior tibial pulses are 2+ on the right side and 2+ on the left side.      Comments: CFT< 3 secs all toes bilateral foot, skin temp warm to cool bilateral foot, no hair growth bilateral lower extremity, no edema to bilateral lower extremities    Pulmonary:      Effort: Pulmonary effort is normal.   Musculoskeletal:         General: No swelling.      Right foot: Normal range of motion. No deformity.      Left foot: Normal range of motion. No deformity.      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     5/5 muscle strength with DF/PF/Inv/Ev bilateral.    Inspection and palpation of the joints and bones reveal no crepitus or joint effusion. No tenderness upon palpation.  Angle and base of gait are normal.    Feet:      Right foot:      Skin integrity: No skin breakdown or erythema.      Left foot:      Skin integrity: No skin breakdown or erythema.   Skin:     General: Skin is warm and dry.      Capillary Refill: Capillary refill takes 2 to 3 seconds.      Findings: No erythema.      Nails: There is no clubbing.      Comments: Ulceration location:   Right sub 5th MPJ ulceration measuring 0.2 cm in diameter by 0.1 cm in depth to subcutaneous tissue post debridement.  Unchanged since last " "visit.   Neurological:      Mental Status: He is alert and oriented to person, place, and time.      Sensory: Sensory deficit present.      Motor: No weakness.      Deep Tendon Reflexes:      Reflex Scores:       Patellar reflexes are 2+ on the right side and 2+ on the left side.       Achilles reflexes are 2+ on the right side and 2+ on the left side.     Comments: Diminished/loss of protective sensation all toes bilateral to 10 gram monofilament.   Psychiatric:         Mood and Affect: Mood normal.         Behavior: Behavior normal.         Thought Content: Thought content normal.               Assessment:       Encounter Diagnoses   Name Primary?    Type II diabetes mellitus with neurological manifestations Yes    Foot ulcer, right, with fat layer exposed     Foot ulcer, left, with fat layer exposed                  Plan:       Billy was seen today for wound check.    Diagnoses and all orders for this visit:    Type II diabetes mellitus with neurological manifestations    Foot ulcer, right, with fat layer exposed    Foot ulcer, left, with fat layer exposed      Independent review of patients previous clinical notes    Reviewed current medication(s), and lab(s) specific to foot ailment(s) with patient in detail. All questions answered   Decision making:  Chronic illnesses discussed in detail, previous records/notes and imaging independently reviewed, prescription drug management performed in addition to lengthy discussion regarding both conservative and surgical treatment options.    Wound Debridement    Performed by: Mingo Melara DPM   Authorized by: Patient    Time out: Immediately prior to procedure a "time out" was called to verify the correct patient, procedure, equipment, support staff and site/side marked as required.   Consent Done?:  Yes (Verbal)   Local anesthesia used?: No       Wound Details:    Location:  Right foot     Type of Debridement:  Excisional       Length (cm):  0.2       Width (cm):  " 0.2       Depth (cm):  0.1       Percent Debrided (%):  100             Depth of debridement:  Subcutaneous tissue    Tissue debrided:  Dermis, Epidermis and Subcutaneous    Devitalized tissue debrided:  Biofilm, Callus and Necrotic/Eschar    Instruments:  Blade, Curette and Nippers     Bleeding:  Minimal  Hemostasis Achieved: Yes    Method Used:  Pressure  Patient tolerance:  Patient tolerated the procedure well with no immediate complications      Dressings: Hydrofera Blue ready  Offloading:Foam      Short-term goals include maintaining good offloading and minimizing bioburden, promoting granulation and epithelialization to healing.  Long-term goals include keeping the wound healed by good offloading and medical management under the direction of internist.    Follow-up in 2 weeks.

## 2023-10-17 ENCOUNTER — OFFICE VISIT (OUTPATIENT)
Dept: PODIATRY | Facility: CLINIC | Age: 58
End: 2023-10-17
Payer: COMMERCIAL

## 2023-10-17 VITALS
WEIGHT: 281.06 LBS | SYSTOLIC BLOOD PRESSURE: 138 MMHG | HEART RATE: 75 BPM | DIASTOLIC BLOOD PRESSURE: 72 MMHG | HEIGHT: 74 IN | BODY MASS INDEX: 36.07 KG/M2

## 2023-10-17 DIAGNOSIS — E11.49 TYPE II DIABETES MELLITUS WITH NEUROLOGICAL MANIFESTATIONS: Primary | ICD-10-CM

## 2023-10-17 DIAGNOSIS — M21.961 METATARSAL DEFORMITY, RIGHT: ICD-10-CM

## 2023-10-17 DIAGNOSIS — L97.512 FOOT ULCER, RIGHT, WITH FAT LAYER EXPOSED: ICD-10-CM

## 2023-10-17 PROCEDURE — 4010F PR ACE/ARB THEARPY RXD/TAKEN: ICD-10-PCS | Mod: CPTII,S$GLB,, | Performed by: PODIATRIST

## 2023-10-17 PROCEDURE — 3075F PR MOST RECENT SYSTOLIC BLOOD PRESS GE 130-139MM HG: ICD-10-PCS | Mod: CPTII,S$GLB,, | Performed by: PODIATRIST

## 2023-10-17 PROCEDURE — 3060F POS MICROALBUMINURIA REV: CPT | Mod: CPTII,S$GLB,, | Performed by: PODIATRIST

## 2023-10-17 PROCEDURE — 3052F PR MOST RECENT HEMOGLOBIN A1C LEVEL 8.0 - < 9.0%: ICD-10-PCS | Mod: CPTII,S$GLB,, | Performed by: PODIATRIST

## 2023-10-17 PROCEDURE — 99213 OFFICE O/P EST LOW 20 MIN: CPT | Mod: 25,S$GLB,, | Performed by: PODIATRIST

## 2023-10-17 PROCEDURE — 11042 PR DEBRIDEMENT, SKIN, SUB-Q TISSUE,=<20 SQ CM: ICD-10-PCS | Mod: RT,S$GLB,, | Performed by: PODIATRIST

## 2023-10-17 PROCEDURE — 11042 DBRDMT SUBQ TIS 1ST 20SQCM/<: CPT | Mod: RT,S$GLB,, | Performed by: PODIATRIST

## 2023-10-17 PROCEDURE — 99213 PR OFFICE/OUTPT VISIT, EST, LEVL III, 20-29 MIN: ICD-10-PCS | Mod: 25,S$GLB,, | Performed by: PODIATRIST

## 2023-10-17 PROCEDURE — 3066F PR DOCUMENTATION OF TREATMENT FOR NEPHROPATHY: ICD-10-PCS | Mod: CPTII,S$GLB,, | Performed by: PODIATRIST

## 2023-10-17 PROCEDURE — 3060F PR POS MICROALBUMINURIA RESULT DOCUMENTED/REVIEW: ICD-10-PCS | Mod: CPTII,S$GLB,, | Performed by: PODIATRIST

## 2023-10-17 PROCEDURE — 1159F PR MEDICATION LIST DOCUMENTED IN MEDICAL RECORD: ICD-10-PCS | Mod: CPTII,S$GLB,, | Performed by: PODIATRIST

## 2023-10-17 PROCEDURE — 3066F NEPHROPATHY DOC TX: CPT | Mod: CPTII,S$GLB,, | Performed by: PODIATRIST

## 2023-10-17 PROCEDURE — 4010F ACE/ARB THERAPY RXD/TAKEN: CPT | Mod: CPTII,S$GLB,, | Performed by: PODIATRIST

## 2023-10-17 PROCEDURE — 99999 PR PBB SHADOW E&M-EST. PATIENT-LVL IV: CPT | Mod: PBBFAC,,, | Performed by: PODIATRIST

## 2023-10-17 PROCEDURE — 3078F PR MOST RECENT DIASTOLIC BLOOD PRESSURE < 80 MM HG: ICD-10-PCS | Mod: CPTII,S$GLB,, | Performed by: PODIATRIST

## 2023-10-17 PROCEDURE — 99999 PR PBB SHADOW E&M-EST. PATIENT-LVL IV: ICD-10-PCS | Mod: PBBFAC,,, | Performed by: PODIATRIST

## 2023-10-17 PROCEDURE — 3008F PR BODY MASS INDEX (BMI) DOCUMENTED: ICD-10-PCS | Mod: CPTII,S$GLB,, | Performed by: PODIATRIST

## 2023-10-17 PROCEDURE — 3075F SYST BP GE 130 - 139MM HG: CPT | Mod: CPTII,S$GLB,, | Performed by: PODIATRIST

## 2023-10-17 PROCEDURE — 3008F BODY MASS INDEX DOCD: CPT | Mod: CPTII,S$GLB,, | Performed by: PODIATRIST

## 2023-10-17 PROCEDURE — 3078F DIAST BP <80 MM HG: CPT | Mod: CPTII,S$GLB,, | Performed by: PODIATRIST

## 2023-10-17 PROCEDURE — 1159F MED LIST DOCD IN RCRD: CPT | Mod: CPTII,S$GLB,, | Performed by: PODIATRIST

## 2023-10-17 PROCEDURE — 3052F HG A1C>EQUAL 8.0%<EQUAL 9.0%: CPT | Mod: CPTII,S$GLB,, | Performed by: PODIATRIST

## 2023-10-28 NOTE — PROGRESS NOTES
Subjective:      Patient ID: Billy Rivera is a 58 y.o. male.    Chief Complaint:  Follow-up wound care, right foot.      Billy is a 58 y.o. male who presents to the clinic for evaluation and treatment of high risk feet. Billy has a past medical history of Allergy, CAD (coronary artery disease), native coronary artery (6/25/2013), Cancer, COVID-19 virus detected (9/1/2020), Diabetes mellitus, Diabetes mellitus, type 2, Disorder of kidney and ureter, Heart attack (04/2012), colon cancer, stage I, Hyperlipidemia, Hypertension, Muscular pain, NSTEMI May 2013 - peak troponin 0.22 (5/22/2013), MICHAELA (obstructive sleep apnea), and Retinopathy due to secondary diabetes.  Patient presents for follow-up wound care.      PCP: DRE Baker MD    Date Last Seen by PCP: 3/23/2022    Current shoe gear:  Affected Foot: DARCO Shoe right foot     Affected Foot: Tennis shoe left foot    History of Trauma: negative  Sign of Infection: none        Hemoglobin A1C   Date Value Ref Range Status   07/20/2023 8.7 (H) 4.0 - 5.6 % Final     Comment:     ADA Screening Guidelines:  5.7-6.4%  Consistent with prediabetes  >or=6.5%  Consistent with diabetes    High levels of fetal hemoglobin interfere with the HbA1C  assay. Heterozygous hemoglobin variants (HbS, HgC, etc)do  not significantly interfere with this assay.   However, presence of multiple variants may affect accuracy.     04/06/2023 9.4 (H) 4.0 - 5.6 % Final     Comment:     ADA Screening Guidelines:  5.7-6.4%  Consistent with prediabetes  >or=6.5%  Consistent with diabetes    High levels of fetal hemoglobin interfere with the HbA1C  assay. Heterozygous hemoglobin variants (HbS, HgC, etc)do  not significantly interfere with this assay.   However, presence of multiple variants may affect accuracy.     11/10/2022 7.1 (H) 4.0 - 5.6 % Final     Comment:     ADA Screening Guidelines:  5.7-6.4%  Consistent with prediabetes  >or=6.5%  Consistent with diabetes    High  "levels of fetal hemoglobin interfere with the HbA1C  assay. Heterozygous hemoglobin variants (HbS, HgC, etc)do  not significantly interfere with this assay.   However, presence of multiple variants may affect accuracy.                 Objective:      Vitals:    10/17/23 0818   BP: 138/72   Pulse: 75   Weight: 127.5 kg (281 lb 1.4 oz)   Height: 6' 2" (1.88 m)   PainSc: 0-No pain        Physical Exam  Constitutional:       General: He is not in acute distress.     Appearance: Normal appearance. He is well-developed.   HENT:      Head: Normocephalic and atraumatic.   Cardiovascular:      Pulses:           Dorsalis pedis pulses are 2+ on the right side and 2+ on the left side.        Posterior tibial pulses are 2+ on the right side and 2+ on the left side.      Comments: CFT< 3 secs all toes bilateral foot, skin temp warm to cool bilateral foot, no hair growth bilateral lower extremity, no edema to bilateral lower extremities    Pulmonary:      Effort: Pulmonary effort is normal.   Musculoskeletal:         General: No swelling.      Right foot: Normal range of motion. No deformity.      Left foot: Normal range of motion. No deformity.      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     5/5 muscle strength with DF/PF/Inv/Ev bilateral.    Inspection and palpation of the joints and bones reveal no crepitus or joint effusion. No tenderness upon palpation.  Angle and base of gait are normal.    Feet:      Right foot:      Skin integrity: No skin breakdown or erythema.      Left foot:      Skin integrity: No skin breakdown or erythema.   Skin:     General: Skin is warm and dry.      Capillary Refill: Capillary refill takes 2 to 3 seconds.      Findings: No erythema.      Nails: There is no clubbing.      Comments: Ulceration location:   Right sub 5th MPJ ulceration measuring 0.1 cm in diameter by 0.1 cm in depth to subcutaneous tissue post debridement.  Unchanged since last visit. " "  Neurological:      Mental Status: He is alert and oriented to person, place, and time.      Sensory: Sensory deficit present.      Motor: No weakness.      Deep Tendon Reflexes:      Reflex Scores:       Patellar reflexes are 2+ on the right side and 2+ on the left side.       Achilles reflexes are 2+ on the right side and 2+ on the left side.     Comments: Diminished/loss of protective sensation all toes bilateral to 10 gram monofilament.   Psychiatric:         Mood and Affect: Mood normal.         Behavior: Behavior normal.         Thought Content: Thought content normal.               Assessment:       Encounter Diagnoses   Name Primary?    Type II diabetes mellitus with neurological manifestations Yes    Foot ulcer, right, with fat layer exposed     Metatarsal deformity, right                  Plan:       Billy was seen today for wound check.    Diagnoses and all orders for this visit:    Type II diabetes mellitus with neurological manifestations    Foot ulcer, right, with fat layer exposed    Metatarsal deformity, right      Independent review of patients previous clinical notes    Reviewed current medication(s), and lab(s) specific to foot ailment(s) with patient in detail. All questions answered   Decision making:  Chronic illnesses discussed in detail, previous records/notes and imaging independently reviewed, prescription drug management performed in addition to lengthy discussion regarding both conservative and surgical treatment options.    Wound Debridement    Performed by: Mingo Melara DPM   Authorized by: Patient    Time out: Immediately prior to procedure a "time out" was called to verify the correct patient, procedure, equipment, support staff and site/side marked as required.   Consent Done?:  Yes (Verbal)   Local anesthesia used?: No       Wound Details:    Location:  Right foot     Type of Debridement:  Excisional       Length (cm):  0.1       Width (cm):  0.1       Depth (cm):  0.1       " Percent Debrided (%):  100             Depth of debridement:  Subcutaneous tissue    Tissue debrided:  Dermis, Epidermis and Subcutaneous    Devitalized tissue debrided:  Biofilm, Callus and Necrotic/Eschar    Instruments:  Blade, Curette and Nippers     Bleeding:  Minimal  Hemostasis Achieved: Yes    Method Used:  Pressure  Patient tolerance:  Patient tolerated the procedure well with no immediate complications      Dressings: Hydrofera Blue ready  Offloading:Foam      Short-term goals include maintaining good offloading and minimizing bioburden, promoting granulation and epithelialization to healing.  Long-term goals include keeping the wound healed by good offloading and medical management under the direction of internist.    Follow-up in 2 weeks.

## 2023-10-31 ENCOUNTER — OFFICE VISIT (OUTPATIENT)
Dept: PODIATRY | Facility: CLINIC | Age: 58
End: 2023-10-31
Payer: COMMERCIAL

## 2023-10-31 VITALS
DIASTOLIC BLOOD PRESSURE: 72 MMHG | HEART RATE: 75 BPM | BODY MASS INDEX: 36.07 KG/M2 | WEIGHT: 281.06 LBS | SYSTOLIC BLOOD PRESSURE: 138 MMHG | HEIGHT: 74 IN

## 2023-10-31 DIAGNOSIS — L97.512 FOOT ULCER, RIGHT, WITH FAT LAYER EXPOSED: ICD-10-CM

## 2023-10-31 DIAGNOSIS — M21.961 METATARSAL DEFORMITY, RIGHT: ICD-10-CM

## 2023-10-31 DIAGNOSIS — E11.49 TYPE II DIABETES MELLITUS WITH NEUROLOGICAL MANIFESTATIONS: Primary | ICD-10-CM

## 2023-10-31 DIAGNOSIS — L97.522 FOOT ULCER, LEFT, WITH FAT LAYER EXPOSED: ICD-10-CM

## 2023-10-31 PROCEDURE — 3078F PR MOST RECENT DIASTOLIC BLOOD PRESSURE < 80 MM HG: ICD-10-PCS | Mod: CPTII,S$GLB,, | Performed by: PODIATRIST

## 2023-10-31 PROCEDURE — 99213 OFFICE O/P EST LOW 20 MIN: CPT | Mod: 25,S$GLB,, | Performed by: PODIATRIST

## 2023-10-31 PROCEDURE — 99213 PR OFFICE/OUTPT VISIT, EST, LEVL III, 20-29 MIN: ICD-10-PCS | Mod: 25,S$GLB,, | Performed by: PODIATRIST

## 2023-10-31 PROCEDURE — 4010F PR ACE/ARB THEARPY RXD/TAKEN: ICD-10-PCS | Mod: CPTII,S$GLB,, | Performed by: PODIATRIST

## 2023-10-31 PROCEDURE — 3008F BODY MASS INDEX DOCD: CPT | Mod: CPTII,S$GLB,, | Performed by: PODIATRIST

## 2023-10-31 PROCEDURE — 99999 PR PBB SHADOW E&M-EST. PATIENT-LVL IV: CPT | Mod: PBBFAC,,, | Performed by: PODIATRIST

## 2023-10-31 PROCEDURE — 3066F PR DOCUMENTATION OF TREATMENT FOR NEPHROPATHY: ICD-10-PCS | Mod: CPTII,S$GLB,, | Performed by: PODIATRIST

## 2023-10-31 PROCEDURE — 1159F PR MEDICATION LIST DOCUMENTED IN MEDICAL RECORD: ICD-10-PCS | Mod: CPTII,S$GLB,, | Performed by: PODIATRIST

## 2023-10-31 PROCEDURE — 4010F ACE/ARB THERAPY RXD/TAKEN: CPT | Mod: CPTII,S$GLB,, | Performed by: PODIATRIST

## 2023-10-31 PROCEDURE — 3052F PR MOST RECENT HEMOGLOBIN A1C LEVEL 8.0 - < 9.0%: ICD-10-PCS | Mod: CPTII,S$GLB,, | Performed by: PODIATRIST

## 2023-10-31 PROCEDURE — 3060F POS MICROALBUMINURIA REV: CPT | Mod: CPTII,S$GLB,, | Performed by: PODIATRIST

## 2023-10-31 PROCEDURE — 3075F PR MOST RECENT SYSTOLIC BLOOD PRESS GE 130-139MM HG: ICD-10-PCS | Mod: CPTII,S$GLB,, | Performed by: PODIATRIST

## 2023-10-31 PROCEDURE — 3075F SYST BP GE 130 - 139MM HG: CPT | Mod: CPTII,S$GLB,, | Performed by: PODIATRIST

## 2023-10-31 PROCEDURE — 99999 PR PBB SHADOW E&M-EST. PATIENT-LVL IV: ICD-10-PCS | Mod: PBBFAC,,, | Performed by: PODIATRIST

## 2023-10-31 PROCEDURE — 3008F PR BODY MASS INDEX (BMI) DOCUMENTED: ICD-10-PCS | Mod: CPTII,S$GLB,, | Performed by: PODIATRIST

## 2023-10-31 PROCEDURE — 3078F DIAST BP <80 MM HG: CPT | Mod: CPTII,S$GLB,, | Performed by: PODIATRIST

## 2023-10-31 PROCEDURE — 3066F NEPHROPATHY DOC TX: CPT | Mod: CPTII,S$GLB,, | Performed by: PODIATRIST

## 2023-10-31 PROCEDURE — 1159F MED LIST DOCD IN RCRD: CPT | Mod: CPTII,S$GLB,, | Performed by: PODIATRIST

## 2023-10-31 PROCEDURE — 3060F PR POS MICROALBUMINURIA RESULT DOCUMENTED/REVIEW: ICD-10-PCS | Mod: CPTII,S$GLB,, | Performed by: PODIATRIST

## 2023-10-31 PROCEDURE — 11042 DBRDMT SUBQ TIS 1ST 20SQCM/<: CPT | Mod: S$GLB,,, | Performed by: PODIATRIST

## 2023-10-31 PROCEDURE — 3052F HG A1C>EQUAL 8.0%<EQUAL 9.0%: CPT | Mod: CPTII,S$GLB,, | Performed by: PODIATRIST

## 2023-10-31 PROCEDURE — 11042 PR DEBRIDEMENT, SKIN, SUB-Q TISSUE,=<20 SQ CM: ICD-10-PCS | Mod: S$GLB,,, | Performed by: PODIATRIST

## 2023-11-12 ENCOUNTER — PATIENT MESSAGE (OUTPATIENT)
Dept: DIABETES | Facility: CLINIC | Age: 58
End: 2023-11-12
Payer: COMMERCIAL

## 2023-11-14 ENCOUNTER — OFFICE VISIT (OUTPATIENT)
Dept: PODIATRY | Facility: CLINIC | Age: 58
End: 2023-11-14
Payer: COMMERCIAL

## 2023-11-14 VITALS
WEIGHT: 280.44 LBS | SYSTOLIC BLOOD PRESSURE: 138 MMHG | DIASTOLIC BLOOD PRESSURE: 72 MMHG | HEIGHT: 74 IN | HEART RATE: 75 BPM | BODY MASS INDEX: 35.99 KG/M2

## 2023-11-14 DIAGNOSIS — E11.49 TYPE II DIABETES MELLITUS WITH NEUROLOGICAL MANIFESTATIONS: Primary | ICD-10-CM

## 2023-11-14 DIAGNOSIS — L97.512 FOOT ULCER, RIGHT, WITH FAT LAYER EXPOSED: ICD-10-CM

## 2023-11-14 PROCEDURE — 1159F PR MEDICATION LIST DOCUMENTED IN MEDICAL RECORD: ICD-10-PCS | Mod: CPTII,S$GLB,, | Performed by: PODIATRIST

## 2023-11-14 PROCEDURE — 3075F SYST BP GE 130 - 139MM HG: CPT | Mod: CPTII,S$GLB,, | Performed by: PODIATRIST

## 2023-11-14 PROCEDURE — 3052F HG A1C>EQUAL 8.0%<EQUAL 9.0%: CPT | Mod: CPTII,S$GLB,, | Performed by: PODIATRIST

## 2023-11-14 PROCEDURE — 4010F ACE/ARB THERAPY RXD/TAKEN: CPT | Mod: CPTII,S$GLB,, | Performed by: PODIATRIST

## 2023-11-14 PROCEDURE — 99213 OFFICE O/P EST LOW 20 MIN: CPT | Mod: 25,S$GLB,, | Performed by: PODIATRIST

## 2023-11-14 PROCEDURE — 3060F POS MICROALBUMINURIA REV: CPT | Mod: CPTII,S$GLB,, | Performed by: PODIATRIST

## 2023-11-14 PROCEDURE — 3078F PR MOST RECENT DIASTOLIC BLOOD PRESSURE < 80 MM HG: ICD-10-PCS | Mod: CPTII,S$GLB,, | Performed by: PODIATRIST

## 2023-11-14 PROCEDURE — 3008F BODY MASS INDEX DOCD: CPT | Mod: CPTII,S$GLB,, | Performed by: PODIATRIST

## 2023-11-14 PROCEDURE — 3008F PR BODY MASS INDEX (BMI) DOCUMENTED: ICD-10-PCS | Mod: CPTII,S$GLB,, | Performed by: PODIATRIST

## 2023-11-14 PROCEDURE — 99999 PR PBB SHADOW E&M-EST. PATIENT-LVL IV: CPT | Mod: PBBFAC,,, | Performed by: PODIATRIST

## 2023-11-14 PROCEDURE — 3075F PR MOST RECENT SYSTOLIC BLOOD PRESS GE 130-139MM HG: ICD-10-PCS | Mod: CPTII,S$GLB,, | Performed by: PODIATRIST

## 2023-11-14 PROCEDURE — 11042 DBRDMT SUBQ TIS 1ST 20SQCM/<: CPT | Mod: RT,S$GLB,, | Performed by: PODIATRIST

## 2023-11-14 PROCEDURE — 99999 PR PBB SHADOW E&M-EST. PATIENT-LVL IV: ICD-10-PCS | Mod: PBBFAC,,, | Performed by: PODIATRIST

## 2023-11-14 PROCEDURE — 3052F PR MOST RECENT HEMOGLOBIN A1C LEVEL 8.0 - < 9.0%: ICD-10-PCS | Mod: CPTII,S$GLB,, | Performed by: PODIATRIST

## 2023-11-14 PROCEDURE — 3066F NEPHROPATHY DOC TX: CPT | Mod: CPTII,S$GLB,, | Performed by: PODIATRIST

## 2023-11-14 PROCEDURE — 3066F PR DOCUMENTATION OF TREATMENT FOR NEPHROPATHY: ICD-10-PCS | Mod: CPTII,S$GLB,, | Performed by: PODIATRIST

## 2023-11-14 PROCEDURE — 4010F PR ACE/ARB THEARPY RXD/TAKEN: ICD-10-PCS | Mod: CPTII,S$GLB,, | Performed by: PODIATRIST

## 2023-11-14 PROCEDURE — 3078F DIAST BP <80 MM HG: CPT | Mod: CPTII,S$GLB,, | Performed by: PODIATRIST

## 2023-11-14 PROCEDURE — 3060F PR POS MICROALBUMINURIA RESULT DOCUMENTED/REVIEW: ICD-10-PCS | Mod: CPTII,S$GLB,, | Performed by: PODIATRIST

## 2023-11-14 PROCEDURE — 1159F MED LIST DOCD IN RCRD: CPT | Mod: CPTII,S$GLB,, | Performed by: PODIATRIST

## 2023-11-14 PROCEDURE — 11042 PR DEBRIDEMENT, SKIN, SUB-Q TISSUE,=<20 SQ CM: ICD-10-PCS | Mod: RT,S$GLB,, | Performed by: PODIATRIST

## 2023-11-14 PROCEDURE — 99213 PR OFFICE/OUTPT VISIT, EST, LEVL III, 20-29 MIN: ICD-10-PCS | Mod: 25,S$GLB,, | Performed by: PODIATRIST

## 2023-11-20 NOTE — PROGRESS NOTES
Subjective:      Patient ID: Billy Rivera is a 58 y.o. male.    Chief Complaint:  Follow-up wound care, right foot.      Billy is a 58 y.o. male who presents to the clinic for evaluation and treatment of high risk feet. Billy has a past medical history of Allergy, CAD (coronary artery disease), native coronary artery (6/25/2013), Cancer, COVID-19 virus detected (9/1/2020), Diabetes mellitus, Diabetes mellitus, type 2, Disorder of kidney and ureter, Heart attack (04/2012), colon cancer, stage I, Hyperlipidemia, Hypertension, Muscular pain, NSTEMI May 2013 - peak troponin 0.22 (5/22/2013), MICHAELA (obstructive sleep apnea), and Retinopathy due to secondary diabetes.  Patient presents for follow-up wound care.    PCP: DRE Baker MD    Date Last Seen by PCP: 3/23/2022    Current shoe gear:  Affected Foot: DARCO Shoe right foot     Affected Foot: Tennis shoe left foot    History of Trauma: negative  Sign of Infection: none        Hemoglobin A1C   Date Value Ref Range Status   07/20/2023 8.7 (H) 4.0 - 5.6 % Final     Comment:     ADA Screening Guidelines:  5.7-6.4%  Consistent with prediabetes  >or=6.5%  Consistent with diabetes    High levels of fetal hemoglobin interfere with the HbA1C  assay. Heterozygous hemoglobin variants (HbS, HgC, etc)do  not significantly interfere with this assay.   However, presence of multiple variants may affect accuracy.     04/06/2023 9.4 (H) 4.0 - 5.6 % Final     Comment:     ADA Screening Guidelines:  5.7-6.4%  Consistent with prediabetes  >or=6.5%  Consistent with diabetes    High levels of fetal hemoglobin interfere with the HbA1C  assay. Heterozygous hemoglobin variants (HbS, HgC, etc)do  not significantly interfere with this assay.   However, presence of multiple variants may affect accuracy.     11/10/2022 7.1 (H) 4.0 - 5.6 % Final     Comment:     ADA Screening Guidelines:  5.7-6.4%  Consistent with prediabetes  >or=6.5%  Consistent with diabetes    High levels  "of fetal hemoglobin interfere with the HbA1C  assay. Heterozygous hemoglobin variants (HbS, HgC, etc)do  not significantly interfere with this assay.   However, presence of multiple variants may affect accuracy.                 Objective:      Vitals:    11/14/23 0857   BP: 138/72   Pulse: 75   Weight: 127.2 kg (280 lb 6.8 oz)   Height: 6' 2" (1.88 m)   PainSc: 0-No pain        Physical Exam  Constitutional:       General: He is not in acute distress.     Appearance: Normal appearance. He is well-developed.   HENT:      Head: Normocephalic and atraumatic.   Cardiovascular:      Pulses:           Dorsalis pedis pulses are 2+ on the right side and 2+ on the left side.        Posterior tibial pulses are 2+ on the right side and 2+ on the left side.      Comments: CFT< 3 secs all toes bilateral foot, skin temp warm to cool bilateral foot, no hair growth bilateral lower extremity, no edema to bilateral lower extremities    Pulmonary:      Effort: Pulmonary effort is normal.   Musculoskeletal:         General: No swelling.      Right foot: Normal range of motion. No deformity.      Left foot: Normal range of motion. No deformity.      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     5/5 muscle strength with DF/PF/Inv/Ev bilateral.    Inspection and palpation of the joints and bones reveal no crepitus or joint effusion. No tenderness upon palpation.  Angle and base of gait are normal.    Feet:      Right foot:      Skin integrity: No skin breakdown or erythema.      Left foot:      Skin integrity: No skin breakdown or erythema.   Skin:     General: Skin is warm and dry.      Capillary Refill: Capillary refill takes 2 to 3 seconds.      Findings: No erythema.      Nails: There is no clubbing.      Comments: Ulceration location:   Right sub 5th MPJ ulceration measuring 0.1 cm in diameter by 0.1 cm in depth to subcutaneous tissue post debridement.     Neurological:      Mental " "Status: He is alert and oriented to person, place, and time.      Sensory: Sensory deficit present.      Motor: No weakness.      Deep Tendon Reflexes:      Reflex Scores:       Patellar reflexes are 2+ on the right side and 2+ on the left side.       Achilles reflexes are 2+ on the right side and 2+ on the left side.     Comments: Diminished/loss of protective sensation all toes bilateral to 10 gram monofilament.   Psychiatric:         Mood and Affect: Mood normal.         Behavior: Behavior normal.         Thought Content: Thought content normal.               Assessment:       Encounter Diagnoses   Name Primary?    Type II diabetes mellitus with neurological manifestations Yes    Foot ulcer, right, with fat layer exposed                  Plan:       Billy was seen today for wound check.    Diagnoses and all orders for this visit:    Type II diabetes mellitus with neurological manifestations    Foot ulcer, right, with fat layer exposed      Independent review of patients previous clinical notes    Reviewed current medication(s), and lab(s) specific to foot ailment(s) with patient in detail. All questions answered   Decision making:  Chronic illnesses discussed in detail, previous records/notes and imaging independently reviewed, prescription drug management performed in addition to lengthy discussion regarding both conservative and surgical treatment options.    Wound Debridement/measurements unchanged since last visit    Performed by: Mingo Melara DPM   Authorized by: Patient    Time out: Immediately prior to procedure a "time out" was called to verify the correct patient, procedure, equipment, support staff and site/side marked as required.   Consent Done?:  Yes (Verbal)   Local anesthesia used?: No       Wound Details:    Location:  Right foot     Type of Debridement:  Excisional       Length (cm):  0.1       Width (cm):  0.1       Depth (cm):  0.1       Percent Debrided (%):  100             Depth of " debridement:  Subcutaneous tissue    Tissue debrided:  Dermis, Epidermis and Subcutaneous    Devitalized tissue debrided:  Biofilm, Callus and Necrotic/Eschar    Instruments:  Blade, Curette and Nippers     Bleeding:  Minimal  Hemostasis Achieved: Yes    Method Used:  Pressure  Patient tolerance:  Patient tolerated the procedure well with no immediate complications      Dressings: Hydrofera Blue ready  Offloading:Foam      Short-term goals include maintaining good offloading and minimizing bioburden, promoting granulation and epithelialization to healing.  Long-term goals include keeping the wound healed by good offloading and medical management under the direction of internist.    Follow-up in 2 weeks.

## 2023-11-21 ENCOUNTER — PATIENT MESSAGE (OUTPATIENT)
Dept: PODIATRY | Facility: CLINIC | Age: 58
End: 2023-11-21
Payer: COMMERCIAL

## 2023-11-22 ENCOUNTER — OFFICE VISIT (OUTPATIENT)
Dept: PODIATRY | Facility: CLINIC | Age: 58
End: 2023-11-22
Payer: COMMERCIAL

## 2023-11-22 VITALS
DIASTOLIC BLOOD PRESSURE: 72 MMHG | SYSTOLIC BLOOD PRESSURE: 138 MMHG | HEIGHT: 74 IN | HEART RATE: 75 BPM | BODY MASS INDEX: 36 KG/M2

## 2023-11-22 DIAGNOSIS — E11.49 TYPE II DIABETES MELLITUS WITH NEUROLOGICAL MANIFESTATIONS: Primary | ICD-10-CM

## 2023-11-22 DIAGNOSIS — L97.512 FOOT ULCER, RIGHT, WITH FAT LAYER EXPOSED: ICD-10-CM

## 2023-11-22 PROCEDURE — 4010F PR ACE/ARB THEARPY RXD/TAKEN: ICD-10-PCS | Mod: CPTII,S$GLB,, | Performed by: PODIATRIST

## 2023-11-22 PROCEDURE — 3052F PR MOST RECENT HEMOGLOBIN A1C LEVEL 8.0 - < 9.0%: ICD-10-PCS | Mod: CPTII,S$GLB,, | Performed by: PODIATRIST

## 2023-11-22 PROCEDURE — 3008F PR BODY MASS INDEX (BMI) DOCUMENTED: ICD-10-PCS | Mod: CPTII,S$GLB,, | Performed by: PODIATRIST

## 2023-11-22 PROCEDURE — 11042 PR DEBRIDEMENT, SKIN, SUB-Q TISSUE,=<20 SQ CM: ICD-10-PCS | Mod: RT,S$GLB,, | Performed by: PODIATRIST

## 2023-11-22 PROCEDURE — 4010F ACE/ARB THERAPY RXD/TAKEN: CPT | Mod: CPTII,S$GLB,, | Performed by: PODIATRIST

## 2023-11-22 PROCEDURE — 3078F PR MOST RECENT DIASTOLIC BLOOD PRESSURE < 80 MM HG: ICD-10-PCS | Mod: CPTII,S$GLB,, | Performed by: PODIATRIST

## 2023-11-22 PROCEDURE — 99213 PR OFFICE/OUTPT VISIT, EST, LEVL III, 20-29 MIN: ICD-10-PCS | Mod: 25,S$GLB,, | Performed by: PODIATRIST

## 2023-11-22 PROCEDURE — 3060F POS MICROALBUMINURIA REV: CPT | Mod: CPTII,S$GLB,, | Performed by: PODIATRIST

## 2023-11-22 PROCEDURE — 3075F PR MOST RECENT SYSTOLIC BLOOD PRESS GE 130-139MM HG: ICD-10-PCS | Mod: CPTII,S$GLB,, | Performed by: PODIATRIST

## 2023-11-22 PROCEDURE — 3008F BODY MASS INDEX DOCD: CPT | Mod: CPTII,S$GLB,, | Performed by: PODIATRIST

## 2023-11-22 PROCEDURE — 3066F PR DOCUMENTATION OF TREATMENT FOR NEPHROPATHY: ICD-10-PCS | Mod: CPTII,S$GLB,, | Performed by: PODIATRIST

## 2023-11-22 PROCEDURE — 11042 DBRDMT SUBQ TIS 1ST 20SQCM/<: CPT | Mod: RT,S$GLB,, | Performed by: PODIATRIST

## 2023-11-22 PROCEDURE — 1159F PR MEDICATION LIST DOCUMENTED IN MEDICAL RECORD: ICD-10-PCS | Mod: CPTII,S$GLB,, | Performed by: PODIATRIST

## 2023-11-22 PROCEDURE — 3066F NEPHROPATHY DOC TX: CPT | Mod: CPTII,S$GLB,, | Performed by: PODIATRIST

## 2023-11-22 PROCEDURE — 3075F SYST BP GE 130 - 139MM HG: CPT | Mod: CPTII,S$GLB,, | Performed by: PODIATRIST

## 2023-11-22 PROCEDURE — 3052F HG A1C>EQUAL 8.0%<EQUAL 9.0%: CPT | Mod: CPTII,S$GLB,, | Performed by: PODIATRIST

## 2023-11-22 PROCEDURE — 99999 PR PBB SHADOW E&M-EST. PATIENT-LVL IV: ICD-10-PCS | Mod: PBBFAC,,, | Performed by: PODIATRIST

## 2023-11-22 PROCEDURE — 1159F MED LIST DOCD IN RCRD: CPT | Mod: CPTII,S$GLB,, | Performed by: PODIATRIST

## 2023-11-22 PROCEDURE — 3060F PR POS MICROALBUMINURIA RESULT DOCUMENTED/REVIEW: ICD-10-PCS | Mod: CPTII,S$GLB,, | Performed by: PODIATRIST

## 2023-11-22 PROCEDURE — 3078F DIAST BP <80 MM HG: CPT | Mod: CPTII,S$GLB,, | Performed by: PODIATRIST

## 2023-11-22 PROCEDURE — 99999 PR PBB SHADOW E&M-EST. PATIENT-LVL IV: CPT | Mod: PBBFAC,,, | Performed by: PODIATRIST

## 2023-11-22 PROCEDURE — 99213 OFFICE O/P EST LOW 20 MIN: CPT | Mod: 25,S$GLB,, | Performed by: PODIATRIST

## 2023-11-26 NOTE — PROGRESS NOTES
Subjective:      Patient ID: Billy Rivera is a 58 y.o. male.    Chief Complaint:  Follow-up wound care, right foot.      Billy is a 58 y.o. male who presents to the clinic for evaluation and treatment of high risk feet. Billy has a past medical history of Allergy, CAD (coronary artery disease), native coronary artery (6/25/2013), Cancer, COVID-19 virus detected (9/1/2020), Diabetes mellitus, Diabetes mellitus, type 2, Disorder of kidney and ureter, Heart attack (04/2012), colon cancer, stage I, Hyperlipidemia, Hypertension, Muscular pain, NSTEMI May 2013 - peak troponin 0.22 (5/22/2013), MICHAELA (obstructive sleep apnea), and Retinopathy due to secondary diabetes.  Patient presents for follow-up wound care.    PCP: DRE Baker MD    Date Last Seen by PCP: 3/23/2022    Current shoe gear:  Affected Foot: DARCO Shoe right foot     Affected Foot: Tennis shoe left foot    History of Trauma: negative  Sign of Infection: none        Hemoglobin A1C   Date Value Ref Range Status   07/20/2023 8.7 (H) 4.0 - 5.6 % Final     Comment:     ADA Screening Guidelines:  5.7-6.4%  Consistent with prediabetes  >or=6.5%  Consistent with diabetes    High levels of fetal hemoglobin interfere with the HbA1C  assay. Heterozygous hemoglobin variants (HbS, HgC, etc)do  not significantly interfere with this assay.   However, presence of multiple variants may affect accuracy.     04/06/2023 9.4 (H) 4.0 - 5.6 % Final     Comment:     ADA Screening Guidelines:  5.7-6.4%  Consistent with prediabetes  >or=6.5%  Consistent with diabetes    High levels of fetal hemoglobin interfere with the HbA1C  assay. Heterozygous hemoglobin variants (HbS, HgC, etc)do  not significantly interfere with this assay.   However, presence of multiple variants may affect accuracy.     11/10/2022 7.1 (H) 4.0 - 5.6 % Final     Comment:     ADA Screening Guidelines:  5.7-6.4%  Consistent with prediabetes  >or=6.5%  Consistent with diabetes    High levels  "of fetal hemoglobin interfere with the HbA1C  assay. Heterozygous hemoglobin variants (HbS, HgC, etc)do  not significantly interfere with this assay.   However, presence of multiple variants may affect accuracy.                 Objective:      Vitals:    11/22/23 1341   BP: 138/72   Pulse: 75   Height: 6' 2" (1.88 m)   PainSc: 0-No pain        Physical Exam  Constitutional:       General: He is not in acute distress.     Appearance: Normal appearance. He is well-developed.   HENT:      Head: Normocephalic and atraumatic.   Cardiovascular:      Pulses:           Dorsalis pedis pulses are 2+ on the right side and 2+ on the left side.        Posterior tibial pulses are 2+ on the right side and 2+ on the left side.      Comments: CFT< 3 secs all toes bilateral foot, skin temp warm to cool bilateral foot, no hair growth bilateral lower extremity, no edema to bilateral lower extremities    Pulmonary:      Effort: Pulmonary effort is normal.   Musculoskeletal:         General: No swelling.      Right foot: Normal range of motion. No deformity.      Left foot: Normal range of motion. No deformity.      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     5/5 muscle strength with DF/PF/Inv/Ev bilateral.    Inspection and palpation of the joints and bones reveal no crepitus or joint effusion. No tenderness upon palpation.  Angle and base of gait are normal.    Feet:      Right foot:      Skin integrity: No skin breakdown or erythema.      Left foot:      Skin integrity: No skin breakdown or erythema.   Skin:     General: Skin is warm and dry.      Capillary Refill: Capillary refill takes 2 to 3 seconds.      Findings: No erythema.      Nails: There is no clubbing.      Comments: Ulceration location:   Right sub 5th MPJ ulceration measuring 0.1 cm in diameter by 0.1 cm in depth to subcutaneous tissue post debridement.     Neurological:      Mental Status: He is alert and oriented to " "person, place, and time.      Sensory: Sensory deficit present.      Motor: No weakness.      Deep Tendon Reflexes:      Reflex Scores:       Patellar reflexes are 2+ on the right side and 2+ on the left side.       Achilles reflexes are 2+ on the right side and 2+ on the left side.     Comments: Diminished/loss of protective sensation all toes bilateral to 10 gram monofilament.   Psychiatric:         Mood and Affect: Mood normal.         Behavior: Behavior normal.         Thought Content: Thought content normal.               Assessment:       Encounter Diagnoses   Name Primary?    Type II diabetes mellitus with neurological manifestations Yes    Foot ulcer, right, with fat layer exposed                  Plan:       Billy was seen today for wound check.    Diagnoses and all orders for this visit:    Type II diabetes mellitus with neurological manifestations    Foot ulcer, right, with fat layer exposed      Independent review of patients previous clinical notes    Reviewed current medication(s), and lab(s) specific to foot ailment(s) with patient in detail. All questions answered     Decision making:  Chronic illnesses discussed in detail, previous records/notes and imaging independently reviewed, prescription drug management performed in addition to lengthy discussion regarding both conservative and surgical treatment options.    Wound Debridement/measurements unchanged since last visit    Performed by: Mingo Melara DPM   Authorized by: Patient    Time out: Immediately prior to procedure a "time out" was called to verify the correct patient, procedure, equipment, support staff and site/side marked as required.   Consent Done?:  Yes (Verbal)   Local anesthesia used?: No       Wound Details:    Location:  Right foot     Type of Debridement:  Excisional       Length (cm):  0.1       Width (cm):  0.1       Depth (cm):  0.1       Percent Debrided (%):  100             Depth of debridement:  Subcutaneous tissue    " Tissue debrided:  Dermis, Epidermis and Subcutaneous    Devitalized tissue debrided:  Biofilm, Callus and Necrotic/Eschar    Instruments:  Blade, Curette and Nippers     Bleeding:  Minimal  Hemostasis Achieved: Yes    Method Used:  Pressure  Patient tolerance:  Patient tolerated the procedure well with no immediate complications      Dressings: Hydrofera Blue ready  Offloading:Foam      Short-term goals include maintaining good offloading and minimizing bioburden, promoting granulation and epithelialization to healing.  Long-term goals include keeping the wound healed by good offloading and medical management under the direction of internist.    Follow-up in 2 weeks.                   Detail Level: Detailed Quality 110: Preventive Care And Screening: Influenza Immunization: Influenza Immunization previously received during influenza season Quality 226: Preventive Care And Screening: Tobacco Use: Screening And Cessation Intervention: Patient screened for tobacco use and is an ex/non-smoker Quality 111:Pneumonia Vaccination Status For Older Adults: Patient did not receive any pneumococcal conjugate or polysaccharide vaccine on or after their 60th birthday and before the end of the measurement period

## 2023-11-27 ENCOUNTER — PATIENT MESSAGE (OUTPATIENT)
Dept: INTERNAL MEDICINE | Facility: CLINIC | Age: 58
End: 2023-11-27
Payer: COMMERCIAL

## 2023-11-27 DIAGNOSIS — Z79.4 TYPE 2 DIABETES MELLITUS WITH BOTH EYES AFFECTED BY MODERATE NONPROLIFERATIVE RETINOPATHY WITHOUT MACULAR EDEMA, WITH LONG-TERM CURRENT USE OF INSULIN: Primary | ICD-10-CM

## 2023-11-27 DIAGNOSIS — E11.3393 TYPE 2 DIABETES MELLITUS WITH BOTH EYES AFFECTED BY MODERATE NONPROLIFERATIVE RETINOPATHY WITHOUT MACULAR EDEMA, WITH LONG-TERM CURRENT USE OF INSULIN: Primary | ICD-10-CM

## 2023-11-27 RX ORDER — SUBCUTANEOUS INSULIN PUMP
1 EACH MISCELLANEOUS CONTINUOUS
Qty: 1 EACH | Refills: 0 | Status: CANCELLED | OUTPATIENT
Start: 2023-11-27 | End: 2024-11-26

## 2023-11-28 RX ORDER — SUBCUTANEOUS INSULIN PUMP
1 EACH MISCELLANEOUS DAILY
Qty: 1 EACH | Refills: 0 | Status: SHIPPED | OUTPATIENT
Start: 2023-11-28

## 2023-11-28 NOTE — TELEPHONE ENCOUNTER
Hey - so I sent him a message - his original message states he needs supplies for his pump but then the [employer] paperwork says he has the 770 and needs his infusion set and reservoir, but then also has the guardian 3 checked off.     I went ahead and checked off the 780 pump to see if he qualifies for the upgrade and ordered a guardian 4 for him -   I recall at last visit that his insurance changed, so wasn't sure if maybe some of this might be free/low cost.     Let me know if any issues!   See signed/attached rx -     (The tubing/supplies for his 770 should fit a 780 - it's just a program upgrade made to the physical pump is all) -     Hope makes sense!   Put paperwork on your desk.   -  [place]

## 2023-11-29 ENCOUNTER — OFFICE VISIT (OUTPATIENT)
Dept: PODIATRY | Facility: CLINIC | Age: 58
End: 2023-11-29
Payer: COMMERCIAL

## 2023-11-29 VITALS
SYSTOLIC BLOOD PRESSURE: 177 MMHG | DIASTOLIC BLOOD PRESSURE: 84 MMHG | HEART RATE: 82 BPM | WEIGHT: 280.44 LBS | HEIGHT: 74 IN | BODY MASS INDEX: 35.99 KG/M2

## 2023-11-29 DIAGNOSIS — L97.522 FOOT ULCER, LEFT, WITH FAT LAYER EXPOSED: ICD-10-CM

## 2023-11-29 DIAGNOSIS — L97.512 FOOT ULCER, RIGHT, WITH FAT LAYER EXPOSED: ICD-10-CM

## 2023-11-29 DIAGNOSIS — E11.49 TYPE II DIABETES MELLITUS WITH NEUROLOGICAL MANIFESTATIONS: Primary | ICD-10-CM

## 2023-11-29 PROCEDURE — 3066F NEPHROPATHY DOC TX: CPT | Mod: CPTII,S$GLB,, | Performed by: PODIATRIST

## 2023-11-29 PROCEDURE — 3066F PR DOCUMENTATION OF TREATMENT FOR NEPHROPATHY: ICD-10-PCS | Mod: CPTII,S$GLB,, | Performed by: PODIATRIST

## 2023-11-29 PROCEDURE — 11042 PR DEBRIDEMENT, SKIN, SUB-Q TISSUE,=<20 SQ CM: ICD-10-PCS | Mod: RT,S$GLB,, | Performed by: PODIATRIST

## 2023-11-29 PROCEDURE — 3008F BODY MASS INDEX DOCD: CPT | Mod: CPTII,S$GLB,, | Performed by: PODIATRIST

## 2023-11-29 PROCEDURE — 3077F PR MOST RECENT SYSTOLIC BLOOD PRESSURE >= 140 MM HG: ICD-10-PCS | Mod: CPTII,S$GLB,, | Performed by: PODIATRIST

## 2023-11-29 PROCEDURE — 3008F PR BODY MASS INDEX (BMI) DOCUMENTED: ICD-10-PCS | Mod: CPTII,S$GLB,, | Performed by: PODIATRIST

## 2023-11-29 PROCEDURE — 99213 OFFICE O/P EST LOW 20 MIN: CPT | Mod: 25,S$GLB,, | Performed by: PODIATRIST

## 2023-11-29 PROCEDURE — 3077F SYST BP >= 140 MM HG: CPT | Mod: CPTII,S$GLB,, | Performed by: PODIATRIST

## 2023-11-29 PROCEDURE — 3060F POS MICROALBUMINURIA REV: CPT | Mod: CPTII,S$GLB,, | Performed by: PODIATRIST

## 2023-11-29 PROCEDURE — 3079F DIAST BP 80-89 MM HG: CPT | Mod: CPTII,S$GLB,, | Performed by: PODIATRIST

## 2023-11-29 PROCEDURE — 3060F PR POS MICROALBUMINURIA RESULT DOCUMENTED/REVIEW: ICD-10-PCS | Mod: CPTII,S$GLB,, | Performed by: PODIATRIST

## 2023-11-29 PROCEDURE — 4010F PR ACE/ARB THEARPY RXD/TAKEN: ICD-10-PCS | Mod: CPTII,S$GLB,, | Performed by: PODIATRIST

## 2023-11-29 PROCEDURE — 3052F PR MOST RECENT HEMOGLOBIN A1C LEVEL 8.0 - < 9.0%: ICD-10-PCS | Mod: CPTII,S$GLB,, | Performed by: PODIATRIST

## 2023-11-29 PROCEDURE — 99213 PR OFFICE/OUTPT VISIT, EST, LEVL III, 20-29 MIN: ICD-10-PCS | Mod: 25,S$GLB,, | Performed by: PODIATRIST

## 2023-11-29 PROCEDURE — 99999 PR PBB SHADOW E&M-EST. PATIENT-LVL IV: CPT | Mod: PBBFAC,,, | Performed by: PODIATRIST

## 2023-11-29 PROCEDURE — 4010F ACE/ARB THERAPY RXD/TAKEN: CPT | Mod: CPTII,S$GLB,, | Performed by: PODIATRIST

## 2023-11-29 PROCEDURE — 3052F HG A1C>EQUAL 8.0%<EQUAL 9.0%: CPT | Mod: CPTII,S$GLB,, | Performed by: PODIATRIST

## 2023-11-29 PROCEDURE — 11042 DBRDMT SUBQ TIS 1ST 20SQCM/<: CPT | Mod: RT,S$GLB,, | Performed by: PODIATRIST

## 2023-11-29 PROCEDURE — 99999 PR PBB SHADOW E&M-EST. PATIENT-LVL IV: ICD-10-PCS | Mod: PBBFAC,,, | Performed by: PODIATRIST

## 2023-11-29 PROCEDURE — 3079F PR MOST RECENT DIASTOLIC BLOOD PRESSURE 80-89 MM HG: ICD-10-PCS | Mod: CPTII,S$GLB,, | Performed by: PODIATRIST

## 2023-12-05 ENCOUNTER — PATIENT MESSAGE (OUTPATIENT)
Dept: INTERNAL MEDICINE | Facility: CLINIC | Age: 58
End: 2023-12-05
Payer: COMMERCIAL

## 2023-12-05 DIAGNOSIS — E11.51 TYPE 2 DIABETES MELLITUS WITH DIABETIC PERIPHERAL ANGIOPATHY WITHOUT GANGRENE, WITH LONG-TERM CURRENT USE OF INSULIN: Primary | Chronic | ICD-10-CM

## 2023-12-05 DIAGNOSIS — E11.51 TYPE 2 DIABETES MELLITUS WITH DIABETIC PERIPHERAL ANGIOPATHY WITHOUT GANGRENE, WITH LONG-TERM CURRENT USE OF INSULIN: Chronic | ICD-10-CM

## 2023-12-05 DIAGNOSIS — Z79.4 TYPE 2 DIABETES MELLITUS WITH DIABETIC PERIPHERAL ANGIOPATHY WITHOUT GANGRENE, WITH LONG-TERM CURRENT USE OF INSULIN: Primary | Chronic | ICD-10-CM

## 2023-12-05 DIAGNOSIS — Z79.4 TYPE 2 DIABETES MELLITUS WITH DIABETIC PERIPHERAL ANGIOPATHY WITHOUT GANGRENE, WITH LONG-TERM CURRENT USE OF INSULIN: Chronic | ICD-10-CM

## 2023-12-05 RX ORDER — SEMAGLUTIDE 0.68 MG/ML
0.5 INJECTION, SOLUTION SUBCUTANEOUS
Qty: 3 ML | Refills: 6 | Status: CANCELLED | OUTPATIENT
Start: 2023-12-05

## 2023-12-05 RX ORDER — SEMAGLUTIDE 0.68 MG/ML
0.5 INJECTION, SOLUTION SUBCUTANEOUS
Qty: 3 ML | Refills: 6 | Status: SHIPPED | OUTPATIENT
Start: 2023-12-05

## 2023-12-06 ENCOUNTER — OFFICE VISIT (OUTPATIENT)
Dept: PODIATRY | Facility: CLINIC | Age: 58
End: 2023-12-06
Payer: COMMERCIAL

## 2023-12-06 VITALS
SYSTOLIC BLOOD PRESSURE: 174 MMHG | HEART RATE: 74 BPM | DIASTOLIC BLOOD PRESSURE: 84 MMHG | HEIGHT: 74 IN | WEIGHT: 280.44 LBS | BODY MASS INDEX: 35.99 KG/M2

## 2023-12-06 DIAGNOSIS — E11.49 TYPE II DIABETES MELLITUS WITH NEUROLOGICAL MANIFESTATIONS: Primary | ICD-10-CM

## 2023-12-06 DIAGNOSIS — L97.512 FOOT ULCER, RIGHT, WITH FAT LAYER EXPOSED: ICD-10-CM

## 2023-12-06 DIAGNOSIS — L97.522 FOOT ULCER, LEFT, WITH FAT LAYER EXPOSED: ICD-10-CM

## 2023-12-06 PROCEDURE — 3066F NEPHROPATHY DOC TX: CPT | Mod: CPTII,S$GLB,, | Performed by: PODIATRIST

## 2023-12-06 PROCEDURE — 4010F PR ACE/ARB THEARPY RXD/TAKEN: ICD-10-PCS | Mod: CPTII,S$GLB,, | Performed by: PODIATRIST

## 2023-12-06 PROCEDURE — 1159F MED LIST DOCD IN RCRD: CPT | Mod: CPTII,S$GLB,, | Performed by: PODIATRIST

## 2023-12-06 PROCEDURE — 1159F PR MEDICATION LIST DOCUMENTED IN MEDICAL RECORD: ICD-10-PCS | Mod: CPTII,S$GLB,, | Performed by: PODIATRIST

## 2023-12-06 PROCEDURE — 1160F PR REVIEW ALL MEDS BY PRESCRIBER/CLIN PHARMACIST DOCUMENTED: ICD-10-PCS | Mod: CPTII,S$GLB,, | Performed by: PODIATRIST

## 2023-12-06 PROCEDURE — 3060F POS MICROALBUMINURIA REV: CPT | Mod: CPTII,S$GLB,, | Performed by: PODIATRIST

## 2023-12-06 PROCEDURE — 3060F PR POS MICROALBUMINURIA RESULT DOCUMENTED/REVIEW: ICD-10-PCS | Mod: CPTII,S$GLB,, | Performed by: PODIATRIST

## 2023-12-06 PROCEDURE — 3077F SYST BP >= 140 MM HG: CPT | Mod: CPTII,S$GLB,, | Performed by: PODIATRIST

## 2023-12-06 PROCEDURE — 99999 PR PBB SHADOW E&M-EST. PATIENT-LVL IV: ICD-10-PCS | Mod: PBBFAC,,, | Performed by: PODIATRIST

## 2023-12-06 PROCEDURE — 3008F BODY MASS INDEX DOCD: CPT | Mod: CPTII,S$GLB,, | Performed by: PODIATRIST

## 2023-12-06 PROCEDURE — 99999 PR PBB SHADOW E&M-EST. PATIENT-LVL IV: CPT | Mod: PBBFAC,,, | Performed by: PODIATRIST

## 2023-12-06 PROCEDURE — 11043 DBRDMT MUSC&/FSCA 1ST 20/<: CPT | Mod: S$GLB,,, | Performed by: PODIATRIST

## 2023-12-06 PROCEDURE — 4010F ACE/ARB THERAPY RXD/TAKEN: CPT | Mod: CPTII,S$GLB,, | Performed by: PODIATRIST

## 2023-12-06 PROCEDURE — 3077F PR MOST RECENT SYSTOLIC BLOOD PRESSURE >= 140 MM HG: ICD-10-PCS | Mod: CPTII,S$GLB,, | Performed by: PODIATRIST

## 2023-12-06 PROCEDURE — 3079F DIAST BP 80-89 MM HG: CPT | Mod: CPTII,S$GLB,, | Performed by: PODIATRIST

## 2023-12-06 PROCEDURE — 3052F PR MOST RECENT HEMOGLOBIN A1C LEVEL 8.0 - < 9.0%: ICD-10-PCS | Mod: CPTII,S$GLB,, | Performed by: PODIATRIST

## 2023-12-06 PROCEDURE — 99499 UNLISTED E&M SERVICE: CPT | Mod: S$GLB,,, | Performed by: PODIATRIST

## 2023-12-06 PROCEDURE — 3066F PR DOCUMENTATION OF TREATMENT FOR NEPHROPATHY: ICD-10-PCS | Mod: CPTII,S$GLB,, | Performed by: PODIATRIST

## 2023-12-06 PROCEDURE — 99499 NO LOS: ICD-10-PCS | Mod: S$GLB,,, | Performed by: PODIATRIST

## 2023-12-06 PROCEDURE — 3008F PR BODY MASS INDEX (BMI) DOCUMENTED: ICD-10-PCS | Mod: CPTII,S$GLB,, | Performed by: PODIATRIST

## 2023-12-06 PROCEDURE — 11043 PR DEBRIDEMENT, SKIN, SUB-Q TISSUE,MUSCLE,=<20 SQ CM: ICD-10-PCS | Mod: S$GLB,,, | Performed by: PODIATRIST

## 2023-12-06 PROCEDURE — 3052F HG A1C>EQUAL 8.0%<EQUAL 9.0%: CPT | Mod: CPTII,S$GLB,, | Performed by: PODIATRIST

## 2023-12-06 PROCEDURE — 3079F PR MOST RECENT DIASTOLIC BLOOD PRESSURE 80-89 MM HG: ICD-10-PCS | Mod: CPTII,S$GLB,, | Performed by: PODIATRIST

## 2023-12-06 PROCEDURE — 1160F RVW MEDS BY RX/DR IN RCRD: CPT | Mod: CPTII,S$GLB,, | Performed by: PODIATRIST

## 2023-12-10 NOTE — PROGRESS NOTES
Subjective:      Patient ID: Billy Rivera is a 58 y.o. male.    Chief Complaint:  Follow-up wound care, right foot.      Billy is a 58 y.o. male who presents to the clinic for evaluation and treatment of high risk feet. Billy has a past medical history of Allergy, CAD (coronary artery disease), native coronary artery (6/25/2013), Cancer, COVID-19 virus detected (9/1/2020), Diabetes mellitus, Diabetes mellitus, type 2, Disorder of kidney and ureter, Heart attack (04/2012), colon cancer, stage I, Hyperlipidemia, Hypertension, Muscular pain, NSTEMI May 2013 - peak troponin 0.22 (5/22/2013), MICHAELA (obstructive sleep apnea), and Retinopathy due to secondary diabetes.  Patient presents for follow-up wound care.  No new issues today.  PCP: DRE Baker MD    Date Last Seen by PCP: 3/23/2022    Current shoe gear:  Affected Foot: DARCO Shoe right foot     Affected Foot: Tennis shoe left foot    History of Trauma: negative  Sign of Infection: none        Hemoglobin A1C   Date Value Ref Range Status   07/20/2023 8.7 (H) 4.0 - 5.6 % Final     Comment:     ADA Screening Guidelines:  5.7-6.4%  Consistent with prediabetes  >or=6.5%  Consistent with diabetes    High levels of fetal hemoglobin interfere with the HbA1C  assay. Heterozygous hemoglobin variants (HbS, HgC, etc)do  not significantly interfere with this assay.   However, presence of multiple variants may affect accuracy.     04/06/2023 9.4 (H) 4.0 - 5.6 % Final     Comment:     ADA Screening Guidelines:  5.7-6.4%  Consistent with prediabetes  >or=6.5%  Consistent with diabetes    High levels of fetal hemoglobin interfere with the HbA1C  assay. Heterozygous hemoglobin variants (HbS, HgC, etc)do  not significantly interfere with this assay.   However, presence of multiple variants may affect accuracy.     11/10/2022 7.1 (H) 4.0 - 5.6 % Final     Comment:     ADA Screening Guidelines:  5.7-6.4%  Consistent with prediabetes  >or=6.5%  Consistent with  "diabetes    High levels of fetal hemoglobin interfere with the HbA1C  assay. Heterozygous hemoglobin variants (HbS, HgC, etc)do  not significantly interfere with this assay.   However, presence of multiple variants may affect accuracy.                 Objective:      Vitals:    11/29/23 0806   BP: (!) 177/84   Pulse: 82   Weight: 127.2 kg (280 lb 6.8 oz)   Height: 6' 2" (1.88 m)   PainSc: 0-No pain   PainLoc: Foot        Physical Exam  Constitutional:       General: He is not in acute distress.     Appearance: Normal appearance. He is well-developed.   HENT:      Head: Normocephalic and atraumatic.   Cardiovascular:      Pulses:           Dorsalis pedis pulses are 2+ on the right side and 2+ on the left side.        Posterior tibial pulses are 2+ on the right side and 2+ on the left side.      Comments: CFT< 3 secs all toes bilateral foot, skin temp warm to cool bilateral foot, no hair growth bilateral lower extremity, no edema to bilateral lower extremities    Pulmonary:      Effort: Pulmonary effort is normal.   Musculoskeletal:         General: No swelling.      Right foot: Normal range of motion. No deformity.      Left foot: Normal range of motion. No deformity.      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     5/5 muscle strength with DF/PF/Inv/Ev bilateral.    Inspection and palpation of the joints and bones reveal no crepitus or joint effusion. No tenderness upon palpation.  Angle and base of gait are normal.    Feet:      Right foot:      Skin integrity: No skin breakdown or erythema.      Left foot:      Skin integrity: No skin breakdown or erythema.   Skin:     General: Skin is warm and dry.      Capillary Refill: Capillary refill takes 2 to 3 seconds.      Findings: No erythema.      Nails: There is no clubbing.      Comments: Ulceration location:   Right sub 5th MPJ ulceration measuring 0.1 cm in diameter by 0.1 cm in depth to subcutaneous tissue post " "debridement.  Unchanged since last visit.   Neurological:      Mental Status: He is alert and oriented to person, place, and time.      Sensory: Sensory deficit present.      Motor: No weakness.      Deep Tendon Reflexes:      Reflex Scores:       Patellar reflexes are 2+ on the right side and 2+ on the left side.       Achilles reflexes are 2+ on the right side and 2+ on the left side.     Comments: Diminished/loss of protective sensation all toes bilateral to 10 gram monofilament.   Psychiatric:         Mood and Affect: Mood normal.         Behavior: Behavior normal.         Thought Content: Thought content normal.               Assessment:       Encounter Diagnoses   Name Primary?    Type II diabetes mellitus with neurological manifestations Yes    Foot ulcer, right, with fat layer exposed     Foot ulcer, left, with fat layer exposed                  Plan:       Billy was seen today for wound care.    Diagnoses and all orders for this visit:    Type II diabetes mellitus with neurological manifestations    Foot ulcer, right, with fat layer exposed    Foot ulcer, left, with fat layer exposed      Independent review of patients previous clinical notes    Reviewed current medication(s), and lab(s) specific to foot ailment(s) with patient in detail. All questions answered     Decision making:  Chronic illnesses discussed in detail, previous records/notes and imaging independently reviewed, prescription drug management performed in addition to lengthy discussion regarding both conservative and surgical treatment options.    Wound Debridement/measurements unchanged since last visit    Performed by: Mingo Melara DPM   Authorized by: Patient    Time out: Immediately prior to procedure a "time out" was called to verify the correct patient, procedure, equipment, support staff and site/side marked as required.   Consent Done?:  Yes (Verbal)   Local anesthesia used?: No       Wound Details:    Location:  Right foot     " Type of Debridement:  Excisional       Length (cm):  0.1       Width (cm):  0.1       Depth (cm):  0.1       Percent Debrided (%):  100             Depth of debridement:  Subcutaneous tissue    Tissue debrided:  Dermis, Epidermis and Subcutaneous    Devitalized tissue debrided:  Biofilm, Callus and Necrotic/Eschar    Instruments:  Blade, Curette and Nippers     Bleeding:  Minimal  Hemostasis Achieved: Yes    Method Used:  Pressure  Patient tolerance:  Patient tolerated the procedure well with no immediate complications      Dressings: Hydrofera Blue ready  Offloading:Foam      Short-term goals include maintaining good offloading and minimizing bioburden, promoting granulation and epithelialization to healing.  Long-term goals include keeping the wound healed by good offloading and medical management under the direction of internist.    Follow-up in 2 weeks.

## 2023-12-14 ENCOUNTER — PATIENT MESSAGE (OUTPATIENT)
Dept: PODIATRY | Facility: CLINIC | Age: 58
End: 2023-12-14

## 2023-12-14 ENCOUNTER — OFFICE VISIT (OUTPATIENT)
Dept: PODIATRY | Facility: CLINIC | Age: 58
End: 2023-12-14
Payer: COMMERCIAL

## 2023-12-14 VITALS
WEIGHT: 280 LBS | DIASTOLIC BLOOD PRESSURE: 71 MMHG | HEART RATE: 70 BPM | BODY MASS INDEX: 35.94 KG/M2 | HEIGHT: 74 IN | SYSTOLIC BLOOD PRESSURE: 115 MMHG

## 2023-12-14 DIAGNOSIS — E11.49 TYPE II DIABETES MELLITUS WITH NEUROLOGICAL MANIFESTATIONS: Primary | ICD-10-CM

## 2023-12-14 DIAGNOSIS — L97.522 FOOT ULCER, LEFT, WITH FAT LAYER EXPOSED: ICD-10-CM

## 2023-12-14 DIAGNOSIS — L97.512 FOOT ULCER, RIGHT, WITH FAT LAYER EXPOSED: ICD-10-CM

## 2023-12-14 PROCEDURE — 4010F ACE/ARB THERAPY RXD/TAKEN: CPT | Mod: CPTII,S$GLB,, | Performed by: PODIATRIST

## 2023-12-14 PROCEDURE — 3052F HG A1C>EQUAL 8.0%<EQUAL 9.0%: CPT | Mod: CPTII,S$GLB,, | Performed by: PODIATRIST

## 2023-12-14 PROCEDURE — 3066F NEPHROPATHY DOC TX: CPT | Mod: CPTII,S$GLB,, | Performed by: PODIATRIST

## 2023-12-14 PROCEDURE — 99213 OFFICE O/P EST LOW 20 MIN: CPT | Mod: S$GLB,,, | Performed by: PODIATRIST

## 2023-12-14 PROCEDURE — 99213 PR OFFICE/OUTPT VISIT, EST, LEVL III, 20-29 MIN: ICD-10-PCS | Mod: S$GLB,,, | Performed by: PODIATRIST

## 2023-12-14 PROCEDURE — 3078F DIAST BP <80 MM HG: CPT | Mod: CPTII,S$GLB,, | Performed by: PODIATRIST

## 2023-12-14 PROCEDURE — 3066F PR DOCUMENTATION OF TREATMENT FOR NEPHROPATHY: ICD-10-PCS | Mod: CPTII,S$GLB,, | Performed by: PODIATRIST

## 2023-12-14 PROCEDURE — 3074F SYST BP LT 130 MM HG: CPT | Mod: CPTII,S$GLB,, | Performed by: PODIATRIST

## 2023-12-14 PROCEDURE — 3060F PR POS MICROALBUMINURIA RESULT DOCUMENTED/REVIEW: ICD-10-PCS | Mod: CPTII,S$GLB,, | Performed by: PODIATRIST

## 2023-12-14 PROCEDURE — 3060F POS MICROALBUMINURIA REV: CPT | Mod: CPTII,S$GLB,, | Performed by: PODIATRIST

## 2023-12-14 PROCEDURE — 99999 PR PBB SHADOW E&M-EST. PATIENT-LVL III: ICD-10-PCS | Mod: PBBFAC,,, | Performed by: PODIATRIST

## 2023-12-14 PROCEDURE — 3078F PR MOST RECENT DIASTOLIC BLOOD PRESSURE < 80 MM HG: ICD-10-PCS | Mod: CPTII,S$GLB,, | Performed by: PODIATRIST

## 2023-12-14 PROCEDURE — 99999 PR PBB SHADOW E&M-EST. PATIENT-LVL III: CPT | Mod: PBBFAC,,, | Performed by: PODIATRIST

## 2023-12-14 PROCEDURE — 3008F PR BODY MASS INDEX (BMI) DOCUMENTED: ICD-10-PCS | Mod: CPTII,S$GLB,, | Performed by: PODIATRIST

## 2023-12-14 PROCEDURE — 3074F PR MOST RECENT SYSTOLIC BLOOD PRESSURE < 130 MM HG: ICD-10-PCS | Mod: CPTII,S$GLB,, | Performed by: PODIATRIST

## 2023-12-14 PROCEDURE — 3008F BODY MASS INDEX DOCD: CPT | Mod: CPTII,S$GLB,, | Performed by: PODIATRIST

## 2023-12-14 PROCEDURE — 3052F PR MOST RECENT HEMOGLOBIN A1C LEVEL 8.0 - < 9.0%: ICD-10-PCS | Mod: CPTII,S$GLB,, | Performed by: PODIATRIST

## 2023-12-14 PROCEDURE — 4010F PR ACE/ARB THEARPY RXD/TAKEN: ICD-10-PCS | Mod: CPTII,S$GLB,, | Performed by: PODIATRIST

## 2023-12-14 NOTE — PROGRESS NOTES
Subjective:      Patient ID: Billy Rivera is a 58 y.o. male.    Chief Complaint:  Follow-up wound care, right foot.      Billy is a 58 y.o. male who presents to the clinic for evaluation and treatment of high risk feet. Billy has a past medical history of Allergy, CAD (coronary artery disease), native coronary artery (6/25/2013), Cancer, COVID-19 virus detected (9/1/2020), Diabetes mellitus, Diabetes mellitus, type 2, Disorder of kidney and ureter, Heart attack (04/2012), colon cancer, stage I, Hyperlipidemia, Hypertension, Muscular pain, NSTEMI May 2013 - peak troponin 0.22 (5/22/2013), MICHAELA (obstructive sleep apnea), and Retinopathy due to secondary diabetes.  Patient presents for follow-up wound care.  Worsening to the left foot noted by patient during the week.  PCP: DRE Baker MD    Date Last Seen by PCP: 3/23/2022    Current shoe gear:  Affected Foot: DARCO Shoe right foot     Affected Foot: Tennis shoe left foot    History of Trauma: negative  Sign of Infection: none        Hemoglobin A1C   Date Value Ref Range Status   07/20/2023 8.7 (H) 4.0 - 5.6 % Final     Comment:     ADA Screening Guidelines:  5.7-6.4%  Consistent with prediabetes  >or=6.5%  Consistent with diabetes    High levels of fetal hemoglobin interfere with the HbA1C  assay. Heterozygous hemoglobin variants (HbS, HgC, etc)do  not significantly interfere with this assay.   However, presence of multiple variants may affect accuracy.     04/06/2023 9.4 (H) 4.0 - 5.6 % Final     Comment:     ADA Screening Guidelines:  5.7-6.4%  Consistent with prediabetes  >or=6.5%  Consistent with diabetes    High levels of fetal hemoglobin interfere with the HbA1C  assay. Heterozygous hemoglobin variants (HbS, HgC, etc)do  not significantly interfere with this assay.   However, presence of multiple variants may affect accuracy.     11/10/2022 7.1 (H) 4.0 - 5.6 % Final     Comment:     ADA Screening Guidelines:  5.7-6.4%  Consistent with  "prediabetes  >or=6.5%  Consistent with diabetes    High levels of fetal hemoglobin interfere with the HbA1C  assay. Heterozygous hemoglobin variants (HbS, HgC, etc)do  not significantly interfere with this assay.   However, presence of multiple variants may affect accuracy.                 Objective:      Vitals:    12/06/23 0928   BP: (!) 174/84   Pulse: 74   Weight: 127.2 kg (280 lb 6.8 oz)   Height: 6' 2" (1.88 m)   PainSc: 0-No pain        Physical Exam  Constitutional:       General: He is not in acute distress.     Appearance: Normal appearance. He is well-developed and normal weight.   HENT:      Head: Normocephalic and atraumatic.   Cardiovascular:      Pulses:           Dorsalis pedis pulses are 2+ on the right side and 2+ on the left side.        Posterior tibial pulses are 2+ on the right side and 2+ on the left side.      Comments: CFT< 3 secs all toes bilateral foot, skin temp warm to cool bilateral foot, no hair growth bilateral lower extremity, no edema to bilateral lower extremities    Pulmonary:      Effort: Pulmonary effort is normal.   Musculoskeletal:         General: No swelling.      Right foot: Normal range of motion. No deformity.      Left foot: Normal range of motion. No deformity.      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     5/5 muscle strength with DF/PF/Inv/Ev bilateral.    Inspection and palpation of the joints and bones reveal no crepitus or joint effusion. No tenderness upon palpation.  Angle and base of gait are normal.    Feet:      Right foot:      Skin integrity: No skin breakdown or erythema.      Left foot:      Skin integrity: No skin breakdown or erythema.   Skin:     General: Skin is warm and dry.      Capillary Refill: Capillary refill takes 2 to 3 seconds.      Findings: No erythema.      Nails: There is no clubbing.      Comments: Ulceration location:   Right sub 5th MPJ ulceration measuring 0.1 cm in diameter by 0.1 cm " "in depth to subcutaneous tissue post debridement.  Unchanged since last visit.  Left foot sub 5th MPJ ulceration measuring 0.2 cm in diameter by 0.1 cm depth to the subcutaneous tissue post debridement.   Neurological:      Mental Status: He is alert and oriented to person, place, and time.      Sensory: Sensory deficit present.      Motor: No weakness.      Deep Tendon Reflexes:      Reflex Scores:       Patellar reflexes are 2+ on the right side and 2+ on the left side.       Achilles reflexes are 2+ on the right side and 2+ on the left side.     Comments: Diminished/loss of protective sensation all toes bilateral to 10 gram monofilament.   Psychiatric:         Mood and Affect: Mood normal.         Behavior: Behavior normal.         Thought Content: Thought content normal.               Assessment:       Encounter Diagnoses   Name Primary?    Type II diabetes mellitus with neurological manifestations Yes    Foot ulcer, right, with fat layer exposed     Foot ulcer, left, with fat layer exposed                  Plan:       Billy was seen today for wound check.    Diagnoses and all orders for this visit:    Type II diabetes mellitus with neurological manifestations    Foot ulcer, right, with fat layer exposed    Foot ulcer, left, with fat layer exposed      Independent review of patients previous clinical notes    Reviewed current medication(s), and lab(s) specific to foot ailment(s) with patient in detail. All questions answered     Decision making:  Chronic illnesses discussed in detail, previous records/notes and imaging independently reviewed, prescription drug management performed in addition to lengthy discussion regarding both conservative and surgical treatment options.    Wound Debridement/measurements unchanged since last visit    Performed by: Mingo Melara DPM   Authorized by: Patient    Time out: Immediately prior to procedure a "time out" was called to verify the correct patient, procedure, " equipment, support staff and site/side marked as required.   Consent Done?:  Yes (Verbal)   Local anesthesia used?: No       Wound Details:    Location:  Left foot     Type of Debridement:  Excisional       Length (cm):  0.2       Width (cm):  0.2       Depth (cm):  0.1       Percent Debrided (%):  100             Depth of debridement:  Subcutaneous tissue    Tissue debrided:  Dermis, Epidermis and Subcutaneous    Devitalized tissue debrided:  Biofilm, Callus and Necrotic/Eschar    Instruments:  Blade, Curette and Nippers     Bleeding:  Minimal  Hemostasis Achieved: Yes    Method Used:  Pressure  Patient tolerance:  Patient tolerated the procedure well with no immediate complications      Dressings:  Iodosorb/Leticia  Offloading:Foam      Short-term goals include maintaining good offloading and minimizing bioburden, promoting granulation and epithelialization to healing.  Long-term goals include keeping the wound healed by good offloading and medical management under the direction of internist.    Follow-up in 2 weeks.

## 2023-12-15 RX ORDER — CLINDAMYCIN HYDROCHLORIDE 300 MG/1
300 CAPSULE ORAL 3 TIMES DAILY
Qty: 30 CAPSULE | Refills: 0 | Status: SHIPPED | OUTPATIENT
Start: 2023-12-15 | End: 2023-12-25

## 2023-12-16 ENCOUNTER — PATIENT MESSAGE (OUTPATIENT)
Dept: INTERNAL MEDICINE | Facility: CLINIC | Age: 58
End: 2023-12-16
Payer: COMMERCIAL

## 2023-12-16 DIAGNOSIS — Z79.4 TYPE 2 DIABETES MELLITUS WITH DIABETIC PERIPHERAL ANGIOPATHY WITHOUT GANGRENE, WITH LONG-TERM CURRENT USE OF INSULIN: Primary | Chronic | ICD-10-CM

## 2023-12-16 DIAGNOSIS — E11.51 TYPE 2 DIABETES MELLITUS WITH DIABETIC PERIPHERAL ANGIOPATHY WITHOUT GANGRENE, WITH LONG-TERM CURRENT USE OF INSULIN: Primary | Chronic | ICD-10-CM

## 2023-12-16 NOTE — PROGRESS NOTES
Subjective:      Patient ID: Billy Rivera is a 58 y.o. male.    Chief Complaint:  Follow-up wound care, right foot.      Billy is a 58 y.o. male who presents to the clinic for evaluation and treatment of high risk feet. Billy has a past medical history of Allergy, CAD (coronary artery disease), native coronary artery (6/25/2013), Cancer, COVID-19 virus detected (9/1/2020), Diabetes mellitus, Diabetes mellitus, type 2, Disorder of kidney and ureter, Heart attack (04/2012), colon cancer, stage I, Hyperlipidemia, Hypertension, Muscular pain, NSTEMI May 2013 - peak troponin 0.22 (5/22/2013), MICHAELA (obstructive sleep apnea), and Retinopathy due to secondary diabetes.  Patient presents for follow-up wound care.      Patient had a fall several days ago causing an abrasion with redness to the front of the left leg.    PCP: DRE Baker MD    Date Last Seen by PCP: 3/23/2022    Current shoe gear:  Affected Foot: DARCO Shoe right foot     Affected Foot: Tennis shoe left foot    History of Trauma: negative  Sign of Infection: none        Hemoglobin A1C   Date Value Ref Range Status   07/20/2023 8.7 (H) 4.0 - 5.6 % Final     Comment:     ADA Screening Guidelines:  5.7-6.4%  Consistent with prediabetes  >or=6.5%  Consistent with diabetes    High levels of fetal hemoglobin interfere with the HbA1C  assay. Heterozygous hemoglobin variants (HbS, HgC, etc)do  not significantly interfere with this assay.   However, presence of multiple variants may affect accuracy.     04/06/2023 9.4 (H) 4.0 - 5.6 % Final     Comment:     ADA Screening Guidelines:  5.7-6.4%  Consistent with prediabetes  >or=6.5%  Consistent with diabetes    High levels of fetal hemoglobin interfere with the HbA1C  assay. Heterozygous hemoglobin variants (HbS, HgC, etc)do  not significantly interfere with this assay.   However, presence of multiple variants may affect accuracy.     11/10/2022 7.1 (H) 4.0 - 5.6 % Final     Comment:     ADA Screening  "Guidelines:  5.7-6.4%  Consistent with prediabetes  >or=6.5%  Consistent with diabetes    High levels of fetal hemoglobin interfere with the HbA1C  assay. Heterozygous hemoglobin variants (HbS, HgC, etc)do  not significantly interfere with this assay.   However, presence of multiple variants may affect accuracy.                 Objective:      Vitals:    12/14/23 0841   BP: 115/71   Pulse: 70   Weight: 127 kg (280 lb)   Height: 6' 2" (1.88 m)   PainSc: 0-No pain   PainLoc: Foot        Physical Exam  Constitutional:       General: He is not in acute distress.     Appearance: Normal appearance. He is well-developed and normal weight.   HENT:      Head: Normocephalic and atraumatic.   Cardiovascular:      Pulses:           Dorsalis pedis pulses are 2+ on the right side and 2+ on the left side.        Posterior tibial pulses are 2+ on the right side and 2+ on the left side.      Comments: CFT< 3 secs all toes bilateral foot, skin temp warm to cool bilateral foot, no hair growth bilateral lower extremity, no edema to bilateral lower extremities    Pulmonary:      Effort: Pulmonary effort is normal.   Musculoskeletal:         General: No swelling.      Right foot: Normal range of motion. No deformity.      Left foot: Normal range of motion. No deformity.      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     5/5 muscle strength with DF/PF/Inv/Ev bilateral.    Inspection and palpation of the joints and bones reveal no crepitus or joint effusion. No tenderness upon palpation.  Angle and base of gait are normal.    Feet:      Right foot:      Skin integrity: No skin breakdown or erythema.      Left foot:      Skin integrity: No skin breakdown or erythema.   Skin:     General: Skin is warm and dry.      Capillary Refill: Capillary refill takes 2 to 3 seconds.      Findings: No erythema.      Nails: There is no clubbing.      Comments: Ulceration location:   Right sub 5th MPJ " ulceration measuring 0.1 cm in diameter by 0.1 cm in depth to subcutaneous tissue post debridement.  Unchanged since last visit.  Left foot sub 5th MPJ ulceration measuring 0.2 cm in diameter by 0.1 cm depth to the subcutaneous tissue post debridement  All unchanged.  In addition, anterior left lower extremity noted to have erythema as well as an abrasion with blistering noted.   Neurological:      Mental Status: He is alert and oriented to person, place, and time.      Sensory: Sensory deficit present.      Motor: No weakness.      Deep Tendon Reflexes:      Reflex Scores:       Patellar reflexes are 2+ on the right side and 2+ on the left side.       Achilles reflexes are 2+ on the right side and 2+ on the left side.     Comments: Diminished/loss of protective sensation all toes bilateral to 10 gram monofilament.   Psychiatric:         Mood and Affect: Mood normal.         Behavior: Behavior normal.         Thought Content: Thought content normal.               Assessment:       Encounter Diagnoses   Name Primary?    Type II diabetes mellitus with neurological manifestations Yes    Foot ulcer, right, with fat layer exposed     Foot ulcer, left, with fat layer exposed                    Plan:       Billy was seen today for wound care.    Diagnoses and all orders for this visit:    Type II diabetes mellitus with neurological manifestations    Foot ulcer, right, with fat layer exposed    Foot ulcer, left, with fat layer exposed    Other orders  -     clindamycin (CLEOCIN) 300 MG capsule; Take 1 capsule (300 mg total) by mouth 3 (three) times daily. for 10 days        Independent review of patients previous clinical notes    Reviewed current medication(s), and lab(s) specific to foot ailment(s) with patient in detail. All questions answered     Decision making:  Chronic illnesses discussed in detail, previous records/notes and imaging independently reviewed, prescription drug management performed in addition to  lengthy discussion regarding both conservative and surgical treatment options.    The nature of the condition, options for management, as well as potential risks and complications were discussed in detail with patient. Patient was amenable to my recommendations and left my office fully informed and will follow up as instructed or sooner if necessary.      Begin antibiotic therapy.  Continue wound care/Iodosorb applied today with football dressings.  Follow-up in 1 week.

## 2023-12-18 RX ORDER — INSULIN ASPART INJECTION 100 [IU]/ML
100 INJECTION, SOLUTION SUBCUTANEOUS CONTINUOUS
Qty: 60 ML | Refills: 6 | Status: SHIPPED | OUTPATIENT
Start: 2023-12-18 | End: 2024-04-03 | Stop reason: SDUPTHER

## 2023-12-19 NOTE — TELEPHONE ENCOUNTER
No problem, please call him and let him know I sent over rx's of fiasp for U100 - vials to his [facility] on file.   That's for his insulin pump.     Make sure he has a follow up visit scheduled with me also.     Thanks!   -Akua

## 2023-12-20 ENCOUNTER — PATIENT OUTREACH (OUTPATIENT)
Dept: ADMINISTRATIVE | Facility: HOSPITAL | Age: 58
End: 2023-12-20
Payer: COMMERCIAL

## 2023-12-20 ENCOUNTER — OFFICE VISIT (OUTPATIENT)
Dept: PODIATRY | Facility: CLINIC | Age: 58
End: 2023-12-20
Payer: COMMERCIAL

## 2023-12-20 VITALS
BODY MASS INDEX: 35.99 KG/M2 | HEIGHT: 74 IN | WEIGHT: 280.44 LBS | DIASTOLIC BLOOD PRESSURE: 74 MMHG | HEART RATE: 70 BPM | SYSTOLIC BLOOD PRESSURE: 125 MMHG

## 2023-12-20 DIAGNOSIS — L97.512 FOOT ULCER, RIGHT, WITH FAT LAYER EXPOSED: ICD-10-CM

## 2023-12-20 DIAGNOSIS — L97.522 FOOT ULCER, LEFT, WITH FAT LAYER EXPOSED: ICD-10-CM

## 2023-12-20 DIAGNOSIS — E11.49 TYPE II DIABETES MELLITUS WITH NEUROLOGICAL MANIFESTATIONS: Primary | ICD-10-CM

## 2023-12-20 PROCEDURE — 1159F PR MEDICATION LIST DOCUMENTED IN MEDICAL RECORD: ICD-10-PCS | Mod: CPTII,S$GLB,, | Performed by: PODIATRIST

## 2023-12-20 PROCEDURE — 99999 PR PBB SHADOW E&M-EST. PATIENT-LVL IV: ICD-10-PCS | Mod: PBBFAC,,, | Performed by: PODIATRIST

## 2023-12-20 PROCEDURE — 3052F PR MOST RECENT HEMOGLOBIN A1C LEVEL 8.0 - < 9.0%: ICD-10-PCS | Mod: CPTII,S$GLB,, | Performed by: PODIATRIST

## 2023-12-20 PROCEDURE — 3008F PR BODY MASS INDEX (BMI) DOCUMENTED: ICD-10-PCS | Mod: CPTII,S$GLB,, | Performed by: PODIATRIST

## 2023-12-20 PROCEDURE — 3008F BODY MASS INDEX DOCD: CPT | Mod: CPTII,S$GLB,, | Performed by: PODIATRIST

## 2023-12-20 PROCEDURE — 3060F POS MICROALBUMINURIA REV: CPT | Mod: CPTII,S$GLB,, | Performed by: PODIATRIST

## 2023-12-20 PROCEDURE — 3066F PR DOCUMENTATION OF TREATMENT FOR NEPHROPATHY: ICD-10-PCS | Mod: CPTII,S$GLB,, | Performed by: PODIATRIST

## 2023-12-20 PROCEDURE — 3066F NEPHROPATHY DOC TX: CPT | Mod: CPTII,S$GLB,, | Performed by: PODIATRIST

## 2023-12-20 PROCEDURE — 99999 PR PBB SHADOW E&M-EST. PATIENT-LVL IV: CPT | Mod: PBBFAC,,, | Performed by: PODIATRIST

## 2023-12-20 PROCEDURE — 3074F SYST BP LT 130 MM HG: CPT | Mod: CPTII,S$GLB,, | Performed by: PODIATRIST

## 2023-12-20 PROCEDURE — 99213 OFFICE O/P EST LOW 20 MIN: CPT | Mod: 25,S$GLB,, | Performed by: PODIATRIST

## 2023-12-20 PROCEDURE — 4010F ACE/ARB THERAPY RXD/TAKEN: CPT | Mod: CPTII,S$GLB,, | Performed by: PODIATRIST

## 2023-12-20 PROCEDURE — 3074F PR MOST RECENT SYSTOLIC BLOOD PRESSURE < 130 MM HG: ICD-10-PCS | Mod: CPTII,S$GLB,, | Performed by: PODIATRIST

## 2023-12-20 PROCEDURE — 99213 PR OFFICE/OUTPT VISIT, EST, LEVL III, 20-29 MIN: ICD-10-PCS | Mod: 25,S$GLB,, | Performed by: PODIATRIST

## 2023-12-20 PROCEDURE — 1159F MED LIST DOCD IN RCRD: CPT | Mod: CPTII,S$GLB,, | Performed by: PODIATRIST

## 2023-12-20 PROCEDURE — 11042 PR DEBRIDEMENT, SKIN, SUB-Q TISSUE,=<20 SQ CM: ICD-10-PCS | Mod: S$GLB,,, | Performed by: PODIATRIST

## 2023-12-20 PROCEDURE — 11042 DBRDMT SUBQ TIS 1ST 20SQCM/<: CPT | Mod: S$GLB,,, | Performed by: PODIATRIST

## 2023-12-20 PROCEDURE — 3078F DIAST BP <80 MM HG: CPT | Mod: CPTII,S$GLB,, | Performed by: PODIATRIST

## 2023-12-20 PROCEDURE — 3060F PR POS MICROALBUMINURIA RESULT DOCUMENTED/REVIEW: ICD-10-PCS | Mod: CPTII,S$GLB,, | Performed by: PODIATRIST

## 2023-12-20 PROCEDURE — 4010F PR ACE/ARB THEARPY RXD/TAKEN: ICD-10-PCS | Mod: CPTII,S$GLB,, | Performed by: PODIATRIST

## 2023-12-20 PROCEDURE — 3052F HG A1C>EQUAL 8.0%<EQUAL 9.0%: CPT | Mod: CPTII,S$GLB,, | Performed by: PODIATRIST

## 2023-12-20 PROCEDURE — 3078F PR MOST RECENT DIASTOLIC BLOOD PRESSURE < 80 MM HG: ICD-10-PCS | Mod: CPTII,S$GLB,, | Performed by: PODIATRIST

## 2023-12-20 NOTE — PROGRESS NOTES

## 2023-12-26 ENCOUNTER — TELEPHONE (OUTPATIENT)
Dept: PODIATRY | Facility: CLINIC | Age: 58
End: 2023-12-26
Payer: COMMERCIAL

## 2023-12-26 NOTE — TELEPHONE ENCOUNTER
Dr Melara is out called patient to reschedule no answer left a voice mail with new appointment date. Called 4 times no response.

## 2023-12-26 NOTE — PROGRESS NOTES
Subjective:      Patient ID: Billy Rivera is a 58 y.o. male.    Chief Complaint:  Follow-up wound care, right foot.      Billy is a 58 y.o. male who presents to the clinic for evaluation and treatment of high risk feet. Billy has a past medical history of Allergy, CAD (coronary artery disease), native coronary artery (6/25/2013), Cancer, COVID-19 virus detected (9/1/2020), Diabetes mellitus, Diabetes mellitus, type 2, Disorder of kidney and ureter, Heart attack (04/2012), colon cancer, stage I, Hyperlipidemia, Hypertension, Muscular pain, NSTEMI May 2013 - peak troponin 0.22 (5/22/2013), MICHAELA (obstructive sleep apnea), and Retinopathy due to secondary diabetes.  Patient presents for follow-up wound care.  Improving however still having redness initially left leg.        PCP: DRE Baker MD    Date Last Seen by PCP: 3/23/2022    Current shoe gear:  Affected Foot: DARCO Shoe right foot     Affected Foot: Tennis shoe left foot    History of Trauma: negative  Sign of Infection: none        Hemoglobin A1C   Date Value Ref Range Status   07/20/2023 8.7 (H) 4.0 - 5.6 % Final     Comment:     ADA Screening Guidelines:  5.7-6.4%  Consistent with prediabetes  >or=6.5%  Consistent with diabetes    High levels of fetal hemoglobin interfere with the HbA1C  assay. Heterozygous hemoglobin variants (HbS, HgC, etc)do  not significantly interfere with this assay.   However, presence of multiple variants may affect accuracy.     04/06/2023 9.4 (H) 4.0 - 5.6 % Final     Comment:     ADA Screening Guidelines:  5.7-6.4%  Consistent with prediabetes  >or=6.5%  Consistent with diabetes    High levels of fetal hemoglobin interfere with the HbA1C  assay. Heterozygous hemoglobin variants (HbS, HgC, etc)do  not significantly interfere with this assay.   However, presence of multiple variants may affect accuracy.     11/10/2022 7.1 (H) 4.0 - 5.6 % Final     Comment:     ADA Screening Guidelines:  5.7-6.4%  Consistent with  "prediabetes  >or=6.5%  Consistent with diabetes    High levels of fetal hemoglobin interfere with the HbA1C  assay. Heterozygous hemoglobin variants (HbS, HgC, etc)do  not significantly interfere with this assay.   However, presence of multiple variants may affect accuracy.                 Objective:      Vitals:    12/20/23 0850   BP: 125/74   Pulse: 70   Weight: 127.2 kg (280 lb 6.8 oz)   Height: 6' 2" (1.88 m)   PainSc: 0-No pain        Physical Exam  Constitutional:       General: He is not in acute distress.     Appearance: Normal appearance. He is well-developed and normal weight.   HENT:      Head: Normocephalic and atraumatic.   Cardiovascular:      Pulses:           Dorsalis pedis pulses are 2+ on the right side and 2+ on the left side.        Posterior tibial pulses are 2+ on the right side and 2+ on the left side.      Comments: CFT< 3 secs all toes bilateral foot, skin temp warm to cool bilateral foot, no hair growth bilateral lower extremity, no edema to bilateral lower extremities    Pulmonary:      Effort: Pulmonary effort is normal.   Musculoskeletal:         General: No swelling.      Right foot: Normal range of motion. No deformity.      Left foot: Normal range of motion. No deformity.      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     5/5 muscle strength with DF/PF/Inv/Ev bilateral.    Inspection and palpation of the joints and bones reveal no crepitus or joint effusion. No tenderness upon palpation.  Angle and base of gait are normal.    Feet:      Right foot:      Skin integrity: No skin breakdown or erythema.      Left foot:      Skin integrity: No skin breakdown or erythema.   Skin:     General: Skin is warm and dry.      Capillary Refill: Capillary refill takes 2 to 3 seconds.      Findings: No erythema.      Nails: There is no clubbing.      Comments: Ulceration location:   Right sub 5th MPJ ulceration measuring 0.1 cm in diameter by 0.1 cm in " depth to subcutaneous tissue post debridement.  Unchanged since last visit.  Left foot sub 5th MPJ ulceration measuring 0.2 cm in diameter by 0.1 cm depth to the subcutaneous tissue post debridement  All unchanged.  In addition, anterior left lower extremity noted to have erythema as well as an abrasion with blistering noted.   Neurological:      Mental Status: He is alert and oriented to person, place, and time.      Sensory: Sensory deficit present.      Motor: No weakness.      Deep Tendon Reflexes:      Reflex Scores:       Patellar reflexes are 2+ on the right side and 2+ on the left side.       Achilles reflexes are 2+ on the right side and 2+ on the left side.     Comments: Diminished/loss of protective sensation all toes bilateral to 10 gram monofilament.   Psychiatric:         Mood and Affect: Mood normal.         Behavior: Behavior normal.         Thought Content: Thought content normal.               Assessment:       Encounter Diagnoses   Name Primary?    Type II diabetes mellitus with neurological manifestations Yes    Foot ulcer, right, with fat layer exposed     Foot ulcer, left, with fat layer exposed                      Plan:       Billy was seen today for wound check.    Diagnoses and all orders for this visit:    Type II diabetes mellitus with neurological manifestations    Foot ulcer, right, with fat layer exposed    Foot ulcer, left, with fat layer exposed          Independent review of patients previous clinical notes    Reviewed current medication(s), and lab(s) specific to foot ailment(s) with patient in detail. All questions answered     Decision making:  Chronic illnesses discussed in detail, previous records/notes and imaging independently reviewed, prescription drug management performed in addition to lengthy discussion regarding both conservative and surgical treatment options.    The nature of the condition, options for management, as well as potential risks and complications were  "discussed in detail with patient. Patient was amenable to my recommendations and left my office fully informed and will follow up as instructed or sooner if necessary.      Wound Debridement    Performed by: Mingo Melara DPM   Authorized by: Patient    Time out: Immediately prior to procedure a "time out" was called to verify the correct patient, procedure, equipment, support staff and site/side marked as required.   Consent Done?:  Yes (Verbal)  Local anesthesia used?: No       Wound Details:    Location:  Left foot     Type of Debridement:  Excisional       Length (cm):  0.1       Width (cm):  0.1       Depth (cm):  0.1       Percent Debrided (%):  100             Depth of debridement:  Subcutaneous tissue    Tissue debrided:  Dermis, Epidermis and Subcutaneous    Devitalized tissue debrided:  Biofilm, Callus and Necrotic/Eschar    Instruments:  Blade, Curette and Nippers     Bleeding:  Minimal  Hemostasis Achieved: Yes    Method Used:  Pressure  Patient tolerance:  Patient tolerated the procedure well with no immediate complications    Continue antibiotic therapy.  Continue wound care/Iodosorb applied today with football dressings.  Follow-up in 1 week.    "

## 2023-12-28 ENCOUNTER — OFFICE VISIT (OUTPATIENT)
Dept: PODIATRY | Facility: CLINIC | Age: 58
End: 2023-12-28
Payer: COMMERCIAL

## 2023-12-28 VITALS
HEART RATE: 80 BPM | BODY MASS INDEX: 35.99 KG/M2 | DIASTOLIC BLOOD PRESSURE: 74 MMHG | HEIGHT: 74 IN | SYSTOLIC BLOOD PRESSURE: 146 MMHG | WEIGHT: 280.44 LBS

## 2023-12-28 DIAGNOSIS — E11.49 TYPE II DIABETES MELLITUS WITH NEUROLOGICAL MANIFESTATIONS: Primary | ICD-10-CM

## 2023-12-28 DIAGNOSIS — L97.512 FOOT ULCER, RIGHT, WITH FAT LAYER EXPOSED: ICD-10-CM

## 2023-12-28 DIAGNOSIS — L97.522 FOOT ULCER, LEFT, WITH FAT LAYER EXPOSED: ICD-10-CM

## 2023-12-28 PROCEDURE — 99213 OFFICE O/P EST LOW 20 MIN: CPT | Mod: S$GLB,,, | Performed by: PODIATRIST

## 2023-12-28 PROCEDURE — 3078F DIAST BP <80 MM HG: CPT | Mod: CPTII,S$GLB,, | Performed by: PODIATRIST

## 2023-12-28 PROCEDURE — 3008F BODY MASS INDEX DOCD: CPT | Mod: CPTII,S$GLB,, | Performed by: PODIATRIST

## 2023-12-28 PROCEDURE — 99999 PR PBB SHADOW E&M-EST. PATIENT-LVL IV: CPT | Mod: PBBFAC,,, | Performed by: PODIATRIST

## 2023-12-28 PROCEDURE — 3077F SYST BP >= 140 MM HG: CPT | Mod: CPTII,S$GLB,, | Performed by: PODIATRIST

## 2024-01-03 ENCOUNTER — OFFICE VISIT (OUTPATIENT)
Dept: PODIATRY | Facility: CLINIC | Age: 59
End: 2024-01-03
Payer: COMMERCIAL

## 2024-01-03 VITALS
WEIGHT: 280.44 LBS | HEIGHT: 74 IN | DIASTOLIC BLOOD PRESSURE: 76 MMHG | HEART RATE: 52 BPM | BODY MASS INDEX: 35.99 KG/M2 | SYSTOLIC BLOOD PRESSURE: 143 MMHG

## 2024-01-03 DIAGNOSIS — L97.522 FOOT ULCER, LEFT, WITH FAT LAYER EXPOSED: Primary | ICD-10-CM

## 2024-01-03 DIAGNOSIS — E11.49 TYPE II DIABETES MELLITUS WITH NEUROLOGICAL MANIFESTATIONS: ICD-10-CM

## 2024-01-03 DIAGNOSIS — M86.9 OSTEOMYELITIS, UNSPECIFIED SITE, UNSPECIFIED TYPE: ICD-10-CM

## 2024-01-03 DIAGNOSIS — L97.512 FOOT ULCER, RIGHT, WITH FAT LAYER EXPOSED: ICD-10-CM

## 2024-01-03 PROCEDURE — 88305 TISSUE EXAM BY PATHOLOGIST: CPT | Performed by: PATHOLOGY

## 2024-01-03 PROCEDURE — 1159F MED LIST DOCD IN RCRD: CPT | Mod: CPTII,S$GLB,, | Performed by: PODIATRIST

## 2024-01-03 PROCEDURE — 99999 PR PBB SHADOW E&M-EST. PATIENT-LVL IV: CPT | Mod: PBBFAC,,, | Performed by: PODIATRIST

## 2024-01-03 PROCEDURE — 87077 CULTURE AEROBIC IDENTIFY: CPT | Performed by: PODIATRIST

## 2024-01-03 PROCEDURE — 87186 SC STD MICRODIL/AGAR DIL: CPT | Performed by: PODIATRIST

## 2024-01-03 PROCEDURE — 99213 OFFICE O/P EST LOW 20 MIN: CPT | Mod: 25,S$GLB,, | Performed by: PODIATRIST

## 2024-01-03 PROCEDURE — 87075 CULTR BACTERIA EXCEPT BLOOD: CPT | Performed by: PODIATRIST

## 2024-01-03 PROCEDURE — 11042 DBRDMT SUBQ TIS 1ST 20SQCM/<: CPT | Mod: 59,S$GLB,, | Performed by: PODIATRIST

## 2024-01-03 PROCEDURE — 88311 DECALCIFY TISSUE: CPT | Performed by: PATHOLOGY

## 2024-01-03 PROCEDURE — 3008F BODY MASS INDEX DOCD: CPT | Mod: CPTII,S$GLB,, | Performed by: PODIATRIST

## 2024-01-03 PROCEDURE — 88307 TISSUE EXAM BY PATHOLOGIST: CPT | Mod: 26,,, | Performed by: PATHOLOGY

## 2024-01-03 PROCEDURE — 20240 BONE BIOPSY OPEN SUPERFICIAL: CPT | Mod: 51,S$GLB,, | Performed by: PODIATRIST

## 2024-01-03 PROCEDURE — 87070 CULTURE OTHR SPECIMN AEROBIC: CPT | Performed by: PODIATRIST

## 2024-01-03 PROCEDURE — 11043 DBRDMT MUSC&/FSCA 1ST 20/<: CPT | Mod: S$GLB,,, | Performed by: PODIATRIST

## 2024-01-03 PROCEDURE — 3077F SYST BP >= 140 MM HG: CPT | Mod: CPTII,S$GLB,, | Performed by: PODIATRIST

## 2024-01-03 PROCEDURE — 3078F DIAST BP <80 MM HG: CPT | Mod: CPTII,S$GLB,, | Performed by: PODIATRIST

## 2024-01-05 LAB
FINAL PATHOLOGIC DIAGNOSIS: NORMAL
GROSS: NORMAL
Lab: NORMAL

## 2024-01-05 NOTE — PROGRESS NOTES
Subjective:      Patient ID: Billy Rivera is a 58 y.o. male.    Chief Complaint:  Follow-up wound care, right foot.      Billy is a 58 y.o. male who presents to the clinic for evaluation and treatment of high risk feet. Billy has a past medical history of Allergy, CAD (coronary artery disease), native coronary artery (6/25/2013), Cancer, COVID-19 virus detected (9/1/2020), Diabetes mellitus, Diabetes mellitus, type 2, Disorder of kidney and ureter, Heart attack (04/2012), colon cancer, stage I, Hyperlipidemia, Hypertension, Muscular pain, NSTEMI May 2013 - peak troponin 0.22 (5/22/2013), MICHAELA (obstructive sleep apnea), and Retinopathy due to secondary diabetes.  Patient presents for follow-up wound care.  Worsening left 5th digit        PCP: DRE Baker MD    Date Last Seen by PCP: 3/23/2022    Current shoe gear:  Affected Foot: DARCO Shoe right foot     Affected Foot: Tennis shoe left foot    History of Trauma: negative  Sign of Infection: none        Hemoglobin A1C   Date Value Ref Range Status   07/20/2023 8.7 (H) 4.0 - 5.6 % Final     Comment:     ADA Screening Guidelines:  5.7-6.4%  Consistent with prediabetes  >or=6.5%  Consistent with diabetes    High levels of fetal hemoglobin interfere with the HbA1C  assay. Heterozygous hemoglobin variants (HbS, HgC, etc)do  not significantly interfere with this assay.   However, presence of multiple variants may affect accuracy.     04/06/2023 9.4 (H) 4.0 - 5.6 % Final     Comment:     ADA Screening Guidelines:  5.7-6.4%  Consistent with prediabetes  >or=6.5%  Consistent with diabetes    High levels of fetal hemoglobin interfere with the HbA1C  assay. Heterozygous hemoglobin variants (HbS, HgC, etc)do  not significantly interfere with this assay.   However, presence of multiple variants may affect accuracy.     11/10/2022 7.1 (H) 4.0 - 5.6 % Final     Comment:     ADA Screening Guidelines:  5.7-6.4%  Consistent with prediabetes  >or=6.5%  Consistent  "with diabetes    High levels of fetal hemoglobin interfere with the HbA1C  assay. Heterozygous hemoglobin variants (HbS, HgC, etc)do  not significantly interfere with this assay.   However, presence of multiple variants may affect accuracy.                 Objective:      Vitals:    01/03/24 0953   BP: (!) 143/76   Pulse: (!) 52   Weight: 127.2 kg (280 lb 6.8 oz)   Height: 6' 2" (1.88 m)   PainSc: 0-No pain        Physical Exam  Constitutional:       General: He is not in acute distress.     Appearance: Normal appearance. He is well-developed and normal weight.   HENT:      Head: Normocephalic and atraumatic.   Cardiovascular:      Pulses:           Dorsalis pedis pulses are 2+ on the right side and 2+ on the left side.        Posterior tibial pulses are 2+ on the right side and 2+ on the left side.      Comments: CFT< 3 secs all toes bilateral foot, skin temp warm to cool bilateral foot, no hair growth bilateral lower extremity, no edema to bilateral lower extremities    Pulmonary:      Effort: Pulmonary effort is normal.   Musculoskeletal:         General: No swelling.      Right foot: Normal range of motion. No deformity.      Left foot: Normal range of motion. No deformity.      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     5/5 muscle strength with DF/PF/Inv/Ev bilateral.    Inspection and palpation of the joints and bones reveal no crepitus or joint effusion. No tenderness upon palpation.  Angle and base of gait are normal.    Feet:      Right foot:      Skin integrity: No skin breakdown or erythema.      Left foot:      Skin integrity: No skin breakdown or erythema.   Skin:     General: Skin is warm and dry.      Capillary Refill: Capillary refill takes 2 to 3 seconds.      Findings: No erythema.      Nails: There is no clubbing.      Comments: Ulceration location:   Right sub 5th MPJ ulceration measuring 0.1 cm in diameter by 0.1 cm in depth to subcutaneous " tissue post debridement.  Unchanged since last visit.  Left foot sub 5th MPJ ulceration measuring 0.2 cm in diameter by 0.1 cm depth to the subcutaneous tissue post debridement  All unchanged.  In addition, anterior left lower extremity noted to have erythema as well as an abrasion with blistering noted.   Neurological:      Mental Status: He is alert and oriented to person, place, and time.      Sensory: Sensory deficit present.      Motor: No weakness.      Deep Tendon Reflexes:      Reflex Scores:       Patellar reflexes are 2+ on the right side and 2+ on the left side.       Achilles reflexes are 2+ on the right side and 2+ on the left side.     Comments: Diminished/loss of protective sensation all toes bilateral to 10 gram monofilament.   Psychiatric:         Mood and Affect: Mood normal.         Behavior: Behavior normal.         Thought Content: Thought content normal.       Who is unchanged.  Left 5th digital wound worsening with probing to bone noted.  This measures 0.5 cm x 0.3 cm x 0.2 cm to the level of the muscle tissue.        Assessment:       Encounter Diagnoses   Name Primary?    Foot ulcer, left, with fat layer exposed Yes    Type II diabetes mellitus with neurological manifestations     Foot ulcer, right, with fat layer exposed     Osteomyelitis, unspecified site, unspecified type                      Plan:       Billy was seen today for wound check and diabetes mellitus.    Diagnoses and all orders for this visit:    Foot ulcer, left, with fat layer exposed  -     Specimen to Pathology Orthopedics  -     CULTURE, AEROBIC  (SPECIFY SOURCE)  -     Culture, Anaerobic    Type II diabetes mellitus with neurological manifestations    Foot ulcer, right, with fat layer exposed    Osteomyelitis, unspecified site, unspecified type          Independent review of patients previous clinical notes    Reviewed current medication(s), and lab(s) specific to foot ailment(s) with patient in detail. All questions  "answered     Decision making:  Chronic illnesses discussed in detail, previous records/notes and imaging independently reviewed, prescription drug management performed in addition to lengthy discussion regarding both conservative and surgical treatment options.    The nature of the condition, options for management, as well as potential risks and complications were discussed in detail with patient. Patient was amenable to my recommendations and left my office fully informed and will follow up as instructed or sooner if necessary.      Wound Debridement/subcutaneous tissue level    Performed by: Mingo Melara DPM   Authorized by: Patient    Time out: Immediately prior to procedure a "time out" was called to verify the correct patient, procedure, equipment, support staff and site/side marked as required.   Consent Done?:  Yes (Verbal)  Local anesthesia used?: No       Wound Details:    Location:  Left foot     Type of Debridement:  Excisional       Length (cm):  0.1       Width (cm):  0.1       Depth (cm):  0.1       Percent Debrided (%):  100             Depth of debridement:  Subcutaneous tissue    Tissue debrided:  Dermis, Epidermis and Subcutaneous    Devitalized tissue debrided:  Biofilm, Callus and Necrotic/Eschar    Instruments:  Blade, Curette and Nippers     Bleeding:  Minimal  Hemostasis Achieved: Yes    Method Used:  Pressure  Patient tolerance:  Patient tolerated the procedure well with no immediate complications    Wound Debridement/subcutaneous tissue and muscle level    Performed by: Mingo Melara DPM   Authorized by: Patient    Time out: Immediately prior to procedure a "time out" was called to verify the correct patient, procedure, equipment, support staff and site/side marked as required.   Consent Done?:  Yes (Verbal)  Local anesthesia used?: No       Wound Details:    Location:  Left 5th digit     Type of Debridement:  Excisional       Length (cm):  0.5       Width (cm):  0.4       Depth " (cm):  0.2       Percent Debrided (%):  100             Depth of debridement:  Subcutaneous tissue    Tissue debrided:  Dermis, Epidermis and Subcutaneous    Devitalized tissue debrided:  Biofilm, Callus and Necrotic/Eschar    Instruments:  Blade, Curette and Nippers     Bleeding:  Minimal  Hemostasis Achieved: Yes    Method Used:  Pressure  Patient tolerance:  Patient tolerated the procedure well with no immediate complications    Bone Biopsy  Procedure Note          Indication/Diagnosis:  Chronic left 5th digital ulceration with probing to bone    Consent Type:  Verbal/written     Time Out Completed: yes    Aseptic Technique: Betadine    Anesthesia:  None needed due to neuropathy.    Anesthetic Drugs Used:  None    Instrumentation: Rongeur      Procedure Site:  Left 5th digit    Complications: none    EBL :  Minimal    Specimen :  Bone left 5th digit    Area was prepped scrubbed and draped, rongeur was utilized to perform excision/partial of intermediate phalanx left 5th digit.  This was then closed with 3-0 nylon suture material areas thoroughly flushed with saline.  Postoperative instructions discussed.  Follow-up in 1 week.

## 2024-01-06 LAB — BACTERIA SPEC AEROBE CULT: ABNORMAL

## 2024-01-08 LAB — BACTERIA SPEC ANAEROBE CULT: NORMAL

## 2024-01-08 NOTE — PROGRESS NOTES
Subjective:      Patient ID: Billy Rivera is a 58 y.o. male.    Chief Complaint:  Follow-up wound care, right foot.      Billy is a 58 y.o. male who presents to the clinic for evaluation and treatment of high risk feet. Billy has a past medical history of Allergy, CAD (coronary artery disease), native coronary artery (6/25/2013), Cancer, COVID-19 virus detected (9/1/2020), Diabetes mellitus, Diabetes mellitus, type 2, Disorder of kidney and ureter, Heart attack (04/2012), colon cancer, stage I, Hyperlipidemia, Hypertension, Muscular pain, NSTEMI May 2013 - peak troponin 0.22 (5/22/2013), MICHAELA (obstructive sleep apnea), and Retinopathy due to secondary diabetes.  Patient presents for follow-up wound care.  Improving however still having redness initially left leg.        PCP: RDE Baker MD    Date Last Seen by PCP: 3/23/2022    Current shoe gear:  Affected Foot: DARCO Shoe right foot     Affected Foot: Tennis shoe left foot    History of Trauma: negative  Sign of Infection: none        Hemoglobin A1C   Date Value Ref Range Status   07/20/2023 8.7 (H) 4.0 - 5.6 % Final     Comment:     ADA Screening Guidelines:  5.7-6.4%  Consistent with prediabetes  >or=6.5%  Consistent with diabetes    High levels of fetal hemoglobin interfere with the HbA1C  assay. Heterozygous hemoglobin variants (HbS, HgC, etc)do  not significantly interfere with this assay.   However, presence of multiple variants may affect accuracy.     04/06/2023 9.4 (H) 4.0 - 5.6 % Final     Comment:     ADA Screening Guidelines:  5.7-6.4%  Consistent with prediabetes  >or=6.5%  Consistent with diabetes    High levels of fetal hemoglobin interfere with the HbA1C  assay. Heterozygous hemoglobin variants (HbS, HgC, etc)do  not significantly interfere with this assay.   However, presence of multiple variants may affect accuracy.     11/10/2022 7.1 (H) 4.0 - 5.6 % Final     Comment:     ADA Screening Guidelines:  5.7-6.4%  Consistent with  "prediabetes  >or=6.5%  Consistent with diabetes    High levels of fetal hemoglobin interfere with the HbA1C  assay. Heterozygous hemoglobin variants (HbS, HgC, etc)do  not significantly interfere with this assay.   However, presence of multiple variants may affect accuracy.                 Objective:      Vitals:    12/28/23 0822   BP: (!) 146/74   Pulse: 80   Weight: 127.2 kg (280 lb 6.8 oz)   Height: 6' 2" (1.88 m)   PainSc: 0-No pain        Physical Exam  Constitutional:       General: He is not in acute distress.     Appearance: Normal appearance. He is well-developed and normal weight.   HENT:      Head: Normocephalic and atraumatic.   Cardiovascular:      Pulses:           Dorsalis pedis pulses are 2+ on the right side and 2+ on the left side.        Posterior tibial pulses are 2+ on the right side and 2+ on the left side.      Comments: CFT< 3 secs all toes bilateral foot, skin temp warm to cool bilateral foot, no hair growth bilateral lower extremity, no edema to bilateral lower extremities    Pulmonary:      Effort: Pulmonary effort is normal.   Musculoskeletal:         General: No swelling.      Right foot: Normal range of motion. No deformity.      Left foot: Normal range of motion. No deformity.      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     5/5 muscle strength with DF/PF/Inv/Ev bilateral.    Inspection and palpation of the joints and bones reveal no crepitus or joint effusion. No tenderness upon palpation.  Angle and base of gait are normal.    Feet:      Right foot:      Skin integrity: No skin breakdown or erythema.      Left foot:      Skin integrity: No skin breakdown or erythema.   Skin:     General: Skin is warm and dry.      Capillary Refill: Capillary refill takes 2 to 3 seconds.      Findings: No erythema.      Nails: There is no clubbing.      Comments: Ulceration location:   Right sub 5th MPJ ulceration measuring 0.1 cm in diameter by 0.1 cm " in depth to subcutaneous tissue post debridement.  Unchanged since last visit.  Left foot sub 5th MPJ ulceration measuring 0.2 cm in diameter by 0.1 cm depth to the subcutaneous tissue post debridement  All unchanged.  In addition, anterior left lower extremity noted to have erythema as well as an abrasion with blistering noted.   Neurological:      Mental Status: He is alert and oriented to person, place, and time.      Sensory: Sensory deficit present.      Motor: No weakness.      Deep Tendon Reflexes:      Reflex Scores:       Patellar reflexes are 2+ on the right side and 2+ on the left side.       Achilles reflexes are 2+ on the right side and 2+ on the left side.     Comments: Diminished/loss of protective sensation all toes bilateral to 10 gram monofilament.   Psychiatric:         Mood and Affect: Mood normal.         Behavior: Behavior normal.         Thought Content: Thought content normal.               Assessment:       Encounter Diagnoses   Name Primary?    Type II diabetes mellitus with neurological manifestations Yes    Foot ulcer, right, with fat layer exposed     Foot ulcer, left, with fat layer exposed                      Plan:       Billy was seen today for diabetes mellitus and foot ulcer.    Diagnoses and all orders for this visit:    Type II diabetes mellitus with neurological manifestations    Foot ulcer, right, with fat layer exposed    Foot ulcer, left, with fat layer exposed          Independent review of patients previous clinical notes    Reviewed current medication(s), and lab(s) specific to foot ailment(s) with patient in detail. All questions answered     Decision making:  Chronic illnesses discussed in detail, previous records/notes and imaging independently reviewed, prescription drug management performed in addition to lengthy discussion regarding both conservative and surgical treatment options.    The nature of the condition, options for management, as well as potential risks  and complications were discussed in detail with patient. Patient was amenable to my recommendations and left my office fully informed and will follow up as instructed or sooner if necessary.      Continue wound care/Iodosorb applied today with football dressings.  Follow-up in 1 week.

## 2024-01-11 ENCOUNTER — OFFICE VISIT (OUTPATIENT)
Dept: PODIATRY | Facility: CLINIC | Age: 59
End: 2024-01-11
Payer: COMMERCIAL

## 2024-01-11 VITALS
HEART RATE: 69 BPM | BODY MASS INDEX: 37.35 KG/M2 | WEIGHT: 291 LBS | HEIGHT: 74 IN | SYSTOLIC BLOOD PRESSURE: 155 MMHG | DIASTOLIC BLOOD PRESSURE: 82 MMHG

## 2024-01-11 DIAGNOSIS — L97.522 FOOT ULCER, LEFT, WITH FAT LAYER EXPOSED: Primary | ICD-10-CM

## 2024-01-11 DIAGNOSIS — E11.49 TYPE II DIABETES MELLITUS WITH NEUROLOGICAL MANIFESTATIONS: ICD-10-CM

## 2024-01-11 PROCEDURE — 1159F MED LIST DOCD IN RCRD: CPT | Mod: CPTII,S$GLB,, | Performed by: PODIATRIST

## 2024-01-11 PROCEDURE — 3077F SYST BP >= 140 MM HG: CPT | Mod: CPTII,S$GLB,, | Performed by: PODIATRIST

## 2024-01-11 PROCEDURE — 3079F DIAST BP 80-89 MM HG: CPT | Mod: CPTII,S$GLB,, | Performed by: PODIATRIST

## 2024-01-11 PROCEDURE — 3008F BODY MASS INDEX DOCD: CPT | Mod: CPTII,S$GLB,, | Performed by: PODIATRIST

## 2024-01-11 PROCEDURE — 1160F RVW MEDS BY RX/DR IN RCRD: CPT | Mod: CPTII,S$GLB,, | Performed by: PODIATRIST

## 2024-01-11 PROCEDURE — 99213 OFFICE O/P EST LOW 20 MIN: CPT | Mod: 25,S$GLB,, | Performed by: PODIATRIST

## 2024-01-11 PROCEDURE — 99999 PR PBB SHADOW E&M-EST. PATIENT-LVL IV: CPT | Mod: PBBFAC,,, | Performed by: PODIATRIST

## 2024-01-11 RX ORDER — DOXYCYCLINE 100 MG/1
100 TABLET ORAL 2 TIMES DAILY
Qty: 20 TABLET | Refills: 0 | Status: SHIPPED | OUTPATIENT
Start: 2024-01-11 | End: 2024-02-07

## 2024-01-17 ENCOUNTER — TELEPHONE (OUTPATIENT)
Dept: PODIATRY | Facility: CLINIC | Age: 59
End: 2024-01-17
Payer: COMMERCIAL

## 2024-01-17 NOTE — TELEPHONE ENCOUNTER
----- Message from Carlene Mansfield MA sent at 1/16/2024  4:47 PM CST -----  Dott[facility] Staff  Caller: @ [phone] (Today,  2:19 PM)  Pt requesting a appointment for the following a wound check this week per the patient he comes once a week  ...Please call and adv @ [phone]

## 2024-01-18 ENCOUNTER — OFFICE VISIT (OUTPATIENT)
Dept: PODIATRY | Facility: CLINIC | Age: 59
End: 2024-01-18
Payer: COMMERCIAL

## 2024-01-18 VITALS — BODY MASS INDEX: 37.36 KG/M2 | HEIGHT: 74 IN

## 2024-01-18 DIAGNOSIS — M86.9 OSTEOMYELITIS, UNSPECIFIED SITE, UNSPECIFIED TYPE: Primary | ICD-10-CM

## 2024-01-18 DIAGNOSIS — E11.49 TYPE II DIABETES MELLITUS WITH NEUROLOGICAL MANIFESTATIONS: ICD-10-CM

## 2024-01-18 DIAGNOSIS — L97.522 FOOT ULCER, LEFT, WITH FAT LAYER EXPOSED: ICD-10-CM

## 2024-01-18 DIAGNOSIS — L97.512 FOOT ULCER, RIGHT, WITH FAT LAYER EXPOSED: ICD-10-CM

## 2024-01-18 PROCEDURE — 99999 PR PBB SHADOW E&M-EST. PATIENT-LVL IV: CPT | Mod: PBBFAC,,, | Performed by: PODIATRIST

## 2024-01-18 PROCEDURE — 1160F RVW MEDS BY RX/DR IN RCRD: CPT | Mod: CPTII,S$GLB,, | Performed by: PODIATRIST

## 2024-01-18 PROCEDURE — 1159F MED LIST DOCD IN RCRD: CPT | Mod: CPTII,S$GLB,, | Performed by: PODIATRIST

## 2024-01-18 PROCEDURE — 11043 DBRDMT MUSC&/FSCA 1ST 20/<: CPT | Mod: T4,S$GLB,, | Performed by: PODIATRIST

## 2024-01-18 PROCEDURE — 99213 OFFICE O/P EST LOW 20 MIN: CPT | Mod: 25,S$GLB,, | Performed by: PODIATRIST

## 2024-01-18 PROCEDURE — 3008F BODY MASS INDEX DOCD: CPT | Mod: CPTII,S$GLB,, | Performed by: PODIATRIST

## 2024-01-18 NOTE — PROGRESS NOTES
Subjective:      Patient ID: Billy Rivera is a 58 y.o. male.    Chief Complaint:  Follow-up wound care, right foot.      Billy is a 58 y.o. male who presents to the clinic for evaluation and treatment of high risk feet. Billy has a past medical history of Allergy, CAD (coronary artery disease), native coronary artery (6/25/2013), Cancer, COVID-19 virus detected (9/1/2020), Diabetes mellitus, Diabetes mellitus, type 2, Disorder of kidney and ureter, Heart attack (04/2012), colon cancer, stage I, Hyperlipidemia, Hypertension, Muscular pain, NSTEMI May 2013 - peak troponin 0.22 (5/22/2013), MICHAELA (obstructive sleep apnea), and Retinopathy due to secondary diabetes.  Patient presents for follow-up wound care.    Ongoing left 5th digital ulceration noted.        PCP: DRE Baker MD    Date Last Seen by PCP: 3/23/2022    Current shoe gear:  Affected Foot: DARCO Shoe right foot     Affected Foot: Tennis shoe left foot    History of Trauma: negative  Sign of Infection: none        Hemoglobin A1C   Date Value Ref Range Status   07/20/2023 8.7 (H) 4.0 - 5.6 % Final     Comment:     ADA Screening Guidelines:  5.7-6.4%  Consistent with prediabetes  >or=6.5%  Consistent with diabetes    High levels of fetal hemoglobin interfere with the HbA1C  assay. Heterozygous hemoglobin variants (HbS, HgC, etc)do  not significantly interfere with this assay.   However, presence of multiple variants may affect accuracy.     04/06/2023 9.4 (H) 4.0 - 5.6 % Final     Comment:     ADA Screening Guidelines:  5.7-6.4%  Consistent with prediabetes  >or=6.5%  Consistent with diabetes    High levels of fetal hemoglobin interfere with the HbA1C  assay. Heterozygous hemoglobin variants (HbS, HgC, etc)do  not significantly interfere with this assay.   However, presence of multiple variants may affect accuracy.     11/10/2022 7.1 (H) 4.0 - 5.6 % Final     Comment:     ADA Screening Guidelines:  5.7-6.4%  Consistent with  "prediabetes  >or=6.5%  Consistent with diabetes    High levels of fetal hemoglobin interfere with the HbA1C  assay. Heterozygous hemoglobin variants (HbS, HgC, etc)do  not significantly interfere with this assay.   However, presence of multiple variants may affect accuracy.                 Objective:      Vitals:    01/18/24 0805   Height: 6' 2" (1.88 m)   PainSc: 0-No pain        Physical Exam  Constitutional:       General: He is not in acute distress.     Appearance: Normal appearance. He is well-developed and normal weight.   HENT:      Head: Normocephalic and atraumatic.   Cardiovascular:      Pulses:           Dorsalis pedis pulses are 2+ on the right side and 2+ on the left side.        Posterior tibial pulses are 2+ on the right side and 2+ on the left side.      Comments: CFT< 3 secs all toes bilateral foot, skin temp warm to cool bilateral foot, no hair growth bilateral lower extremity, no edema to bilateral lower extremities    Pulmonary:      Effort: Pulmonary effort is normal.   Musculoskeletal:         General: No swelling.      Right foot: Normal range of motion. No deformity.      Left foot: Normal range of motion. No deformity.      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     5/5 muscle strength with DF/PF/Inv/Ev bilateral.    Inspection and palpation of the joints and bones reveal no crepitus or joint effusion. No tenderness upon palpation.  Angle and base of gait are normal.    Feet:      Right foot:      Skin integrity: No skin breakdown or erythema.      Left foot:      Skin integrity: No skin breakdown or erythema.   Skin:     General: Skin is warm and dry.      Capillary Refill: Capillary refill takes 2 to 3 seconds.      Findings: No erythema.      Nails: There is no clubbing.      Comments: Ulceration location:   Right sub 5th MPJ ulceration measuring 0.1 cm in diameter by 0.1 cm in depth to subcutaneous tissue post debridement.  Unchanged " since last visit.  Left Plantar foot ulceration healed.      Left 5th digit ulceration noted measuring 0.5 cm x 0.3 cm x 0.2 cm to the level of the muscle tissue probing to periosteal tissue.  Previous bone biopsy taken.  Sutures were loose and removed today.   Neurological:      Mental Status: He is alert and oriented to person, place, and time.      Sensory: Sensory deficit present.      Motor: No weakness.      Deep Tendon Reflexes:      Reflex Scores:       Patellar reflexes are 2+ on the right side and 2+ on the left side.       Achilles reflexes are 2+ on the right side and 2+ on the left side.     Comments: Diminished/loss of protective sensation all toes bilateral to 10 gram monofilament.   Psychiatric:         Mood and Affect: Mood normal.         Behavior: Behavior normal.         Thought Content: Thought content normal.       Who is unchanged.  Left 5th digital wound worsening with probing to bone noted.  This measures 0.5 cm x 0.3 cm x 0.2 cm to the level of the muscle tissue.        Assessment:       Encounter Diagnoses   Name Primary?    Osteomyelitis, unspecified site, unspecified type Yes    Foot ulcer, left, with fat layer exposed     Type II diabetes mellitus with neurological manifestations     Foot ulcer, right, with fat layer exposed                      Plan:       Billy was seen today for wound check.    Diagnoses and all orders for this visit:    Osteomyelitis, unspecified site, unspecified type  -     Ambulatory referral/consult to Infectious Disease; Future    Foot ulcer, left, with fat layer exposed    Type II diabetes mellitus with neurological manifestations    Foot ulcer, right, with fat layer exposed          Independent review of patients previous clinical notes    Reviewed current medication(s), and lab(s) specific to foot ailment(s) with patient in detail. All questions answered     Decision making:  Chronic illnesses discussed in detail, previous records/notes and imaging  "independently reviewed, prescription drug management performed in addition to lengthy discussion regarding both conservative and surgical treatment options.    The nature of the condition, options for management, as well as potential risks and complications were discussed in detail with patient. Patient was amenable to my recommendations and left my office fully informed and will follow up as instructed or sooner if necessary.      Wound Debridement/subcutaneous tissue   And muscle tissue level    Performed by: Mingo Melara DPM   Authorized by: Patient    Time out: Immediately prior to procedure a "time out" was called to verify the correct patient, procedure, equipment, support staff and site/side marked as required.   Consent Done?:  Yes (Verbal)  Local anesthesia used?: No       Wound Details:    Location:  Left foot /5th digit    Type of Debridement:  Excisional       Length (cm):  0.5       Width (cm):  0.3       Depth (cm):  0.2       Percent Debrided (%):  100             Depth of debridement:  Subcutaneous tissue    Tissue debrided:  Dermis, Epidermis and Subcutaneous    Devitalized tissue debrided:  Biofilm, Callus and Necrotic/Eschar    Instruments:  Blade, Curette and Nippers     Bleeding:  Minimal  Hemostasis Achieved: Yes    Method Used:  Pressure  Patient tolerance:  Patient tolerated the procedure well with no immediate complications    Wound Debridement/subcutaneous tissue and muscle level    Performed by: Mingo Melara DPM   Authorized by: Patient    Time out: Immediately prior to procedure a "time out" was called to verify the correct patient, procedure, equipment, support staff and site/side marked as required.   Consent Done?:  Yes (Verbal)  Local anesthesia used?: No       Wound Details:    Location:  Left 5th digit     Type of Debridement:  Excisional       Length (cm):  0.5       Width (cm):  0.4       Depth (cm):  0.2       Percent Debrided (%):  100             Depth of debridement: "  Subcutaneous tissue    Tissue debrided:  Dermis, Epidermis and Subcutaneous    Devitalized tissue debrided:  Biofilm, Callus and Necrotic/Eschar    Instruments:  Blade, Curette and Nippers     Bleeding:  Minimal  Hemostasis Achieved: Yes    Method Used:  Pressure  Patient tolerance:  Patient tolerated the procedure well with no immediate complications       Infectious Disease referral placed today.  Wound care discussed in detail.  Follow-up in   One week.

## 2024-01-21 NOTE — PROGRESS NOTES
Subjective:      Patient ID: Billy Rivera is a 58 y.o. male.    Chief Complaint:  Follow-up wound care, right foot.      Billy is a 58 y.o. male who presents to the clinic for evaluation and treatment of high risk feet. Billy has a past medical history of Allergy, CAD (coronary artery disease), native coronary artery (6/25/2013), Cancer, COVID-19 virus detected (9/1/2020), Diabetes mellitus, Diabetes mellitus, type 2, Disorder of kidney and ureter, Heart attack (04/2012), colon cancer, stage I, Hyperlipidemia, Hypertension, Muscular pain, NSTEMI May 2013 - peak troponin 0.22 (5/22/2013), MICHAELA (obstructive sleep apnea), and Retinopathy due to secondary diabetes.  Patient presents for follow-up wound care.      PCP: DRE Baker MD    Date Last Seen by PCP: 3/23/2022    Current shoe gear:  Affected Foot: DARCO Shoe right foot     Affected Foot: Tennis shoe left foot    History of Trauma: negative  Sign of Infection: none        Hemoglobin A1C   Date Value Ref Range Status   07/20/2023 8.7 (H) 4.0 - 5.6 % Final     Comment:     ADA Screening Guidelines:  5.7-6.4%  Consistent with prediabetes  >or=6.5%  Consistent with diabetes    High levels of fetal hemoglobin interfere with the HbA1C  assay. Heterozygous hemoglobin variants (HbS, HgC, etc)do  not significantly interfere with this assay.   However, presence of multiple variants may affect accuracy.     04/06/2023 9.4 (H) 4.0 - 5.6 % Final     Comment:     ADA Screening Guidelines:  5.7-6.4%  Consistent with prediabetes  >or=6.5%  Consistent with diabetes    High levels of fetal hemoglobin interfere with the HbA1C  assay. Heterozygous hemoglobin variants (HbS, HgC, etc)do  not significantly interfere with this assay.   However, presence of multiple variants may affect accuracy.     11/10/2022 7.1 (H) 4.0 - 5.6 % Final     Comment:     ADA Screening Guidelines:  5.7-6.4%  Consistent with prediabetes  >or=6.5%  Consistent with diabetes    High levels  "of fetal hemoglobin interfere with the HbA1C  assay. Heterozygous hemoglobin variants (HbS, HgC, etc)do  not significantly interfere with this assay.   However, presence of multiple variants may affect accuracy.                 Objective:      Vitals:    01/11/24 1404   BP: (!) 155/82   Pulse: 69   Weight: 132 kg (291 lb 0.1 oz)   Height: 6' 2" (1.88 m)   PainSc: 0-No pain        Physical Exam  Constitutional:       General: He is not in acute distress.     Appearance: Normal appearance. He is well-developed and normal weight.   HENT:      Head: Normocephalic and atraumatic.   Cardiovascular:      Pulses:           Dorsalis pedis pulses are 2+ on the right side and 2+ on the left side.        Posterior tibial pulses are 2+ on the right side and 2+ on the left side.      Comments: CFT< 3 secs all toes bilateral foot, skin temp warm to cool bilateral foot, no hair growth bilateral lower extremity, no edema to bilateral lower extremities    Pulmonary:      Effort: Pulmonary effort is normal.   Musculoskeletal:         General: No swelling.      Right foot: Normal range of motion. No deformity.      Left foot: Normal range of motion. No deformity.      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     5/5 muscle strength with DF/PF/Inv/Ev bilateral.    Inspection and palpation of the joints and bones reveal no crepitus or joint effusion. No tenderness upon palpation.  Angle and base of gait are normal.    Feet:      Right foot:      Skin integrity: No skin breakdown or erythema.      Left foot:      Skin integrity: No skin breakdown or erythema.   Skin:     General: Skin is warm and dry.      Capillary Refill: Capillary refill takes 2 to 3 seconds.      Findings: No erythema.      Nails: There is no clubbing.      Comments: Ulceration location:  Sub 5th MPJ ulceration noted to be healed.  Hemorrhagic callus noted.  Left foot sub 2nd MPJ ulceration measuring 0.2 cm in diameter " by 0.1 cm depth to the subcutaneous tissue post debridement  All unchanged.  In addition, anterior left lower extremity noted to have erythema as well as an abrasion with blistering noted.   Neurological:      Mental Status: He is alert and oriented to person, place, and time.      Sensory: Sensory deficit present.      Motor: No weakness.      Deep Tendon Reflexes:      Reflex Scores:       Patellar reflexes are 2+ on the right side and 2+ on the left side.       Achilles reflexes are 2+ on the right side and 2+ on the left side.     Comments: Diminished/loss of protective sensation all toes bilateral to 10 gram monofilament.   Psychiatric:         Mood and Affect: Mood normal.         Behavior: Behavior normal.         Thought Content: Thought content normal.       Left 5th digital wound worsening with probing to bone noted.  This measures 0.4 cm x 0.3 cm x 0.2 cm to the level of the muscle tissue.        Assessment:       Encounter Diagnoses   Name Primary?    Foot ulcer, left, with fat layer exposed Yes    Type II diabetes mellitus with neurological manifestations                      Plan:       Billy was seen today for wound check.    Diagnoses and all orders for this visit:    Foot ulcer, left, with fat layer exposed    Type II diabetes mellitus with neurological manifestations    Other orders  -     doxycycline monohydrate 100 mg Tab; Take 1 tablet (100 mg total) by mouth 2 (two) times daily.          Independent review of patients previous clinical notes    Reviewed current medication(s), and lab(s) specific to foot ailment(s) with patient in detail. All questions answered     Decision making:  Chronic illnesses discussed in detail, previous records/notes and imaging independently reviewed, prescription drug management performed in addition to lengthy discussion regarding both conservative and surgical treatment options.    The nature of the condition, options for management, as well as potential risks  "and complications were discussed in detail with patient. Patient was amenable to my recommendations and left my office fully informed and will follow up as instructed or sooner if necessary.      Wound Debridement/subcutaneous tissue level    Performed by: Mingo Melara DPM   Authorized by: Patient    Time out: Immediately prior to procedure a "time out" was called to verify the correct patient, procedure, equipment, support staff and site/side marked as required.   Consent Done?:  Yes (Verbal)  Local anesthesia used?: No       Wound Details:    Location:  Left foot     Type of Debridement:  Excisional       Length (cm):  0.4       Width (cm):  0.3       Depth (cm):  0.2       Percent Debrided (%):  100             Depth of debridement:  Subcutaneous tissue    Tissue debrided:  Dermis, Epidermis and Subcutaneous    Devitalized tissue debrided:  Biofilm, Callus and Necrotic/Eschar    Instruments:  Blade, Curette and Nippers     Bleeding:  Minimal  Hemostasis Achieved: Yes    Method Used:  Pressure  Patient tolerance:  Patient tolerated the procedure well with no immediate complications    Wound Debridement/subcutaneous tissue and muscle level    Performed by: Mingo Melara DPM   Authorized by: Patient    Time out: Immediately prior to procedure a "time out" was called to verify the correct patient, procedure, equipment, support staff and site/side marked as required.   Consent Done?:  Yes (Verbal)  Local anesthesia used?: No       Wound Details:    Location:  Left 5th digit     Type of Debridement:  Excisional       Length (cm):  0.5       Width (cm):  0.4       Depth (cm):  0.2       Percent Debrided (%):  100             Depth of debridement:  Subcutaneous tissue    Tissue debrided:  Dermis, Epidermis and Subcutaneous    Devitalized tissue debrided:  Biofilm, Callus and Necrotic/Eschar    Instruments:  Blade, Curette and Nippers     Bleeding:  Minimal  Hemostasis Achieved: Yes    Method Used:  " Pressure  Patient tolerance:  Patient tolerated the procedure well with no immediate complications    The nature of the condition, options for management, as well as potential risks and complications were discussed in detail with patient. Patient was amenable to my recommendations and left my office fully informed and will follow up as instructed or sooner if necessary.      Wound care discussed in detail follow-up in 1 week.  Referral to Infectious Disease.

## 2024-01-25 ENCOUNTER — PATIENT OUTREACH (OUTPATIENT)
Dept: ADMINISTRATIVE | Facility: HOSPITAL | Age: 59
End: 2024-01-25
Payer: COMMERCIAL

## 2024-01-25 ENCOUNTER — OFFICE VISIT (OUTPATIENT)
Dept: PODIATRY | Facility: CLINIC | Age: 59
End: 2024-01-25
Payer: COMMERCIAL

## 2024-01-25 VITALS
HEART RATE: 57 BPM | WEIGHT: 291 LBS | SYSTOLIC BLOOD PRESSURE: 139 MMHG | DIASTOLIC BLOOD PRESSURE: 83 MMHG | HEIGHT: 74 IN | RESPIRATION RATE: 18 BRPM | BODY MASS INDEX: 37.35 KG/M2

## 2024-01-25 DIAGNOSIS — L97.512 FOOT ULCER, RIGHT, WITH FAT LAYER EXPOSED: ICD-10-CM

## 2024-01-25 DIAGNOSIS — E11.49 TYPE II DIABETES MELLITUS WITH NEUROLOGICAL MANIFESTATIONS: Primary | ICD-10-CM

## 2024-01-25 DIAGNOSIS — Z87.2 HEALED ULCER OF LEFT FOOT: ICD-10-CM

## 2024-01-25 PROCEDURE — 99499 UNLISTED E&M SERVICE: CPT | Mod: S$GLB,,, | Performed by: PODIATRIST

## 2024-01-25 PROCEDURE — 99999 PR PBB SHADOW E&M-EST. PATIENT-LVL V: CPT | Mod: PBBFAC,,, | Performed by: PODIATRIST

## 2024-01-25 PROCEDURE — 11042 DBRDMT SUBQ TIS 1ST 20SQCM/<: CPT | Mod: S$GLB,,, | Performed by: PODIATRIST

## 2024-01-25 NOTE — PROGRESS NOTES
Updates were requested from care everywhere.  Health Maintenance has been updated.  [facility] immunization registry triggered.  Immunizations were reconciled.  Per [facility] in basket message, pt declined flu vaccine 01/25/2024.

## 2024-01-26 ENCOUNTER — TELEPHONE (OUTPATIENT)
Dept: PODIATRY | Facility: CLINIC | Age: 59
End: 2024-01-26
Payer: COMMERCIAL

## 2024-01-26 NOTE — TELEPHONE ENCOUNTER
Patient gave verbal understanding of scheduled appointment location and time with dr Melara on 1/30

## 2024-01-26 NOTE — PROGRESS NOTES
Subjective:      Patient ID: Billy Rivera is a 58 y.o. male.    Chief Complaint:  Follow-up wound care, right foot.      Billy is a 58 y.o. male who presents to the clinic for evaluation and treatment of high risk feet. Billy has a past medical history of Allergy, CAD (coronary artery disease), native coronary artery (6/25/2013), Cancer, COVID-19 virus detected (9/1/2020), Diabetes mellitus, Diabetes mellitus, type 2, Disorder of kidney and ureter, Heart attack (04/2012), colon cancer, stage I, Hyperlipidemia, Hypertension, Muscular pain, NSTEMI May 2013 - peak troponin 0.22 (5/22/2013), MICHAELA (obstructive sleep apnea), and Retinopathy due to secondary diabetes.  Patient presents for follow-up wound care.    Ongoing left 5th digital ulceration noted.    1.25.24: Patient has been in football dressing, ambulating in Darco shoe x 1 week with out issues         PCP: DRE Baker MD    Date Last Seen by PCP:   Chief Complaint   Patient presents with    Wound Care     Bilateral foot ulcers       Dressing Change     Bilateral football        History of Trauma: negative  Sign of Infection: none        Hemoglobin A1C   Date Value Ref Range Status   07/20/2023 8.7 (H) 4.0 - 5.6 % Final     Comment:     ADA Screening Guidelines:  5.7-6.4%  Consistent with prediabetes  >or=6.5%  Consistent with diabetes    High levels of fetal hemoglobin interfere with the HbA1C  assay. Heterozygous hemoglobin variants (HbS, HgC, etc)do  not significantly interfere with this assay.   However, presence of multiple variants may affect accuracy.     04/06/2023 9.4 (H) 4.0 - 5.6 % Final     Comment:     ADA Screening Guidelines:  5.7-6.4%  Consistent with prediabetes  >or=6.5%  Consistent with diabetes    High levels of fetal hemoglobin interfere with the HbA1C  assay. Heterozygous hemoglobin variants (HbS, HgC, etc)do  not significantly interfere with this assay.   However, presence of multiple variants may affect accuracy.    "  11/10/2022 7.1 (H) 4.0 - 5.6 % Final     Comment:     ADA Screening Guidelines:  5.7-6.4%  Consistent with prediabetes  >or=6.5%  Consistent with diabetes    High levels of fetal hemoglobin interfere with the HbA1C  assay. Heterozygous hemoglobin variants (HbS, HgC, etc)do  not significantly interfere with this assay.   However, presence of multiple variants may affect accuracy.                 Objective:      Vitals:    01/25/24 1119   BP: 139/83   Pulse: (!) 57   Resp: 18   Weight: 132 kg (291 lb)   Height: 6' 2" (1.88 m)   PainSc: 0-No pain        Physical Exam  Constitutional:       General: He is not in acute distress.     Appearance: Normal appearance. He is well-developed and normal weight.   HENT:      Head: Normocephalic and atraumatic.   Cardiovascular:      Pulses:           Dorsalis pedis pulses are 2+ on the right side and 2+ on the left side.        Posterior tibial pulses are 1+ on the right side and 1+ on the left side.      Comments: CFT< 3 secs all toes bilateral foot, skin temp warm to cool bilateral foot, no hair growth bilateral lower extremity, no edema to bilateral lower extremities    Pulmonary:      Effort: Pulmonary effort is normal.   Musculoskeletal:         General: No swelling.      Right foot: Normal range of motion. Deformity present. No foot drop.      Left foot: Normal range of motion. Deformity present. No foot drop.      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     5/5 muscle strength with DF/PF/Inv/Ev bilateral.       Feet:      Right foot:      Protective Sensation: 5 sites tested.  0 sites sensed.      Skin integrity: Ulcer, skin breakdown, callus and dry skin present. No erythema.      Toenail Condition: Right toenails are abnormally thick.      Left foot:      Protective Sensation: 5 sites tested.  0 sites sensed.      Skin integrity: Callus and dry skin present. No ulcer, skin breakdown or erythema.      Toenail Condition: " Left toenails are abnormally thick.   Skin:     General: Skin is warm and dry.      Capillary Refill: Capillary refill takes 2 to 3 seconds.      Findings: No erythema.      Nails: There is no clubbing.      Comments: Ulceration location:   Right sub 5th MPJ ulceration measuring 0.1 cm in diameter by 0.1 cm in depth to subcutaneous tissue post debridement.  Unchanged since last visit.  Left Plantar foot ulceration healed.      Left 5th digit ulceration noted measuring 0.5 cm x 0.3 cm x 0.2 cm to the level of the muscle tissue probing to periosteal tissue.  Previous bone biopsy taken.  Sutures were loose and removed today.   Neurological:      Mental Status: He is alert and oriented to person, place, and time.      Sensory: Sensory deficit present.      Motor: No weakness.      Deep Tendon Reflexes:      Reflex Scores:       Patellar reflexes are 2+ on the right side and 2+ on the left side.       Achilles reflexes are 2+ on the right side and 2+ on the left side.     Comments: Diminished/loss of protective sensation all toes bilateral to 10 gram monofilament.   Psychiatric:         Mood and Affect: Mood normal.         Behavior: Behavior normal.         Thought Content: Thought content normal.       1.25.24      Healed ulceration right sub 2nd metatarsophalangeal joint.  Right plantar lateral foot with a 0.3x0.4x0.2 cm ulceration.  Granular base.  Dry intact periwound.  No malodor.  No drainage.  No deep probe.    Healed ulceration plantar left foot.    Assessment:       Encounter Diagnoses   Name Primary?    Type II diabetes mellitus with neurological manifestations Yes    Foot ulcer, right, with fat layer exposed     Healed ulcer of left foot          Plan:       Billy was seen today for wound care and dressing change.    Diagnoses and all orders for this visit:    Type II diabetes mellitus with neurological manifestations    Foot ulcer, right, with fat layer exposed    Healed ulcer of left  foot          Independent review of patients previous clinical notes    Reviewed current medication(s), and lab(s) specific to foot ailment(s) with patient in detail. All questions answered     The nature of the condition, options for management, as well as potential risks and complications were discussed in detail with patient. Patient was amenable to my recommendations and left my office fully informed and will follow up as instructed or sooner if necessary.      Debridement: With verbal consent, nonviable tissues on the right foot were debrided beyond sub q utilizing a  sterile No. 3 scalpel and forceps. Minimal bleeding controlled with direct pressure  The patient tolerated this well.     Dressings: Leticia  Offloading:Foam    Healed ulceration to the left plantar foot with thin friable epithelial tissue.  Patient consents to security football x1 week.  Recommend patient stay insecurity dressings for an additional 2-3 weeks.    Follow-up:Patient is to return to the clinic in 1 week  for follow-up but should call [facility] immediately if any signs of infection, such as fever, chills, sweats, increased redness or pain.    Short-term goals include maintaining good offloading and minimizing bioburden, promoting granulation and epithelialization to healing.  Long-term goals include keeping the wound healed by good offloading and medical management under the direction of internist.

## 2024-01-30 ENCOUNTER — OFFICE VISIT (OUTPATIENT)
Dept: PODIATRY | Facility: CLINIC | Age: 59
End: 2024-01-30
Payer: COMMERCIAL

## 2024-01-30 VITALS
DIASTOLIC BLOOD PRESSURE: 80 MMHG | SYSTOLIC BLOOD PRESSURE: 139 MMHG | HEART RATE: 50 BPM | BODY MASS INDEX: 37.36 KG/M2 | HEIGHT: 74 IN

## 2024-01-30 DIAGNOSIS — E11.49 TYPE II DIABETES MELLITUS WITH NEUROLOGICAL MANIFESTATIONS: Primary | ICD-10-CM

## 2024-01-30 DIAGNOSIS — L97.512 FOOT ULCER, RIGHT, WITH FAT LAYER EXPOSED: ICD-10-CM

## 2024-01-30 DIAGNOSIS — M86.9 OSTEOMYELITIS, UNSPECIFIED SITE, UNSPECIFIED TYPE: ICD-10-CM

## 2024-01-30 DIAGNOSIS — L97.523 ULCER OF LEFT FOOT WITH NECROSIS OF MUSCLE: ICD-10-CM

## 2024-01-30 PROCEDURE — 11043 DBRDMT MUSC&/FSCA 1ST 20/<: CPT | Mod: S$GLB,,, | Performed by: PODIATRIST

## 2024-01-30 PROCEDURE — 99999 PR PBB SHADOW E&M-EST. PATIENT-LVL IV: CPT | Mod: PBBFAC,,, | Performed by: PODIATRIST

## 2024-01-30 PROCEDURE — 3079F DIAST BP 80-89 MM HG: CPT | Mod: CPTII,S$GLB,, | Performed by: PODIATRIST

## 2024-01-30 PROCEDURE — 3008F BODY MASS INDEX DOCD: CPT | Mod: CPTII,S$GLB,, | Performed by: PODIATRIST

## 2024-01-30 PROCEDURE — 99213 OFFICE O/P EST LOW 20 MIN: CPT | Mod: 25,S$GLB,, | Performed by: PODIATRIST

## 2024-01-30 PROCEDURE — 3075F SYST BP GE 130 - 139MM HG: CPT | Mod: CPTII,S$GLB,, | Performed by: PODIATRIST

## 2024-01-31 DIAGNOSIS — E11.9 TYPE 2 DIABETES MELLITUS WITHOUT COMPLICATION: ICD-10-CM

## 2024-02-07 ENCOUNTER — OFFICE VISIT (OUTPATIENT)
Dept: INFECTIOUS DISEASES | Facility: CLINIC | Age: 59
End: 2024-02-07
Payer: COMMERCIAL

## 2024-02-07 ENCOUNTER — OFFICE VISIT (OUTPATIENT)
Dept: PODIATRY | Facility: CLINIC | Age: 59
End: 2024-02-07
Payer: COMMERCIAL

## 2024-02-07 VITALS
SYSTOLIC BLOOD PRESSURE: 140 MMHG | WEIGHT: 293.19 LBS | HEIGHT: 74 IN | HEART RATE: 55 BPM | BODY MASS INDEX: 37.63 KG/M2 | DIASTOLIC BLOOD PRESSURE: 80 MMHG

## 2024-02-07 DIAGNOSIS — M86.172 OTHER ACUTE OSTEOMYELITIS OF LEFT FOOT: Primary | ICD-10-CM

## 2024-02-07 DIAGNOSIS — L97.512 FOOT ULCER, RIGHT, WITH FAT LAYER EXPOSED: ICD-10-CM

## 2024-02-07 DIAGNOSIS — E11.49 TYPE II DIABETES MELLITUS WITH NEUROLOGICAL MANIFESTATIONS: Primary | ICD-10-CM

## 2024-02-07 DIAGNOSIS — M86.9 OSTEOMYELITIS, UNSPECIFIED SITE, UNSPECIFIED TYPE: ICD-10-CM

## 2024-02-07 PROCEDURE — 11043 DBRDMT MUSC&/FSCA 1ST 20/<: CPT | Mod: S$GLB,,, | Performed by: PODIATRIST

## 2024-02-07 PROCEDURE — 3008F BODY MASS INDEX DOCD: CPT | Mod: CPTII,S$GLB,, | Performed by: PODIATRIST

## 2024-02-07 PROCEDURE — 1159F MED LIST DOCD IN RCRD: CPT | Mod: CPTII,S$GLB,, | Performed by: PODIATRIST

## 2024-02-07 PROCEDURE — 3077F SYST BP >= 140 MM HG: CPT | Mod: CPTII,S$GLB,, | Performed by: PODIATRIST

## 2024-02-07 PROCEDURE — 99213 OFFICE O/P EST LOW 20 MIN: CPT | Mod: 25,S$GLB,, | Performed by: PODIATRIST

## 2024-02-07 PROCEDURE — 99999 PR PBB SHADOW E&M-EST. PATIENT-LVL IV: CPT | Mod: PBBFAC,,, | Performed by: PODIATRIST

## 2024-02-07 PROCEDURE — 3079F DIAST BP 80-89 MM HG: CPT | Mod: CPTII,S$GLB,, | Performed by: PODIATRIST

## 2024-02-07 PROCEDURE — 99999 PR PBB SHADOW E&M-EST. PATIENT-LVL I: CPT | Mod: PBBFAC,,, | Performed by: STUDENT IN AN ORGANIZED HEALTH CARE EDUCATION/TRAINING PROGRAM

## 2024-02-07 PROCEDURE — 99215 OFFICE O/P EST HI 40 MIN: CPT | Mod: S$GLB,,, | Performed by: STUDENT IN AN ORGANIZED HEALTH CARE EDUCATION/TRAINING PROGRAM

## 2024-02-07 RX ORDER — PRENATAL VIT 91/IRON/FOLIC/DHA 28-975-200
COMBINATION PACKAGE (EA) ORAL
Qty: 12 G | Refills: 0 | Status: SHIPPED | OUTPATIENT
Start: 2024-02-07 | End: 2024-03-06

## 2024-02-07 RX ORDER — TERBINAFINE HYDROCHLORIDE 250 MG/1
250 TABLET ORAL DAILY
Qty: 30 TABLET | Refills: 0 | Status: SHIPPED | OUTPATIENT
Start: 2024-02-07 | End: 2024-03-14

## 2024-02-07 RX ORDER — DOXYCYCLINE 100 MG/1
100 CAPSULE ORAL 2 TIMES DAILY
Qty: 84 CAPSULE | Refills: 0 | Status: SHIPPED | OUTPATIENT
Start: 2024-02-07 | End: 2024-03-20

## 2024-02-07 NOTE — PROGRESS NOTES
Subjective:      Patient ID: Billy Rivera is a 58 y.o. male.    Chief Complaint:  Follow-up wound care, right foot.      Billy is a 58 y.o. male who presents to the clinic for evaluation and treatment of high risk feet. Billy has a past medical history of Allergy, CAD (coronary artery disease), native coronary artery (6/25/2013), Cancer, COVID-19 virus detected (9/1/2020), Diabetes mellitus, Diabetes mellitus, type 2, Disorder of kidney and ureter, Heart attack (04/2012), colon cancer, stage I, Hyperlipidemia, Hypertension, Muscular pain, NSTEMI May 2013 - peak troponin 0.22 (5/22/2013), MICHAELA (obstructive sleep apnea), and Retinopathy due to secondary diabetes.  Patient presents for follow-up wound care.    Ongoing left 5th digital ulceration noted.  Patient will be seen with Infectious Disease today as well to determine antibiotic therapy for osteomyelitis left 5th digit.    1.25.24: Patient has been in football dressing, ambulating in Darco shoe x 1 week with out issues         PCP: DRE Baker MD    Date Last Seen by PCP:   Chief Complaint   Patient presents with    Wound Check     Bilateral        History of Trauma: negative  Sign of Infection: none        Hemoglobin A1C   Date Value Ref Range Status   07/20/2023 8.7 (H) 4.0 - 5.6 % Final     Comment:     ADA Screening Guidelines:  5.7-6.4%  Consistent with prediabetes  >or=6.5%  Consistent with diabetes    High levels of fetal hemoglobin interfere with the HbA1C  assay. Heterozygous hemoglobin variants (HbS, HgC, etc)do  not significantly interfere with this assay.   However, presence of multiple variants may affect accuracy.     04/06/2023 9.4 (H) 4.0 - 5.6 % Final     Comment:     ADA Screening Guidelines:  5.7-6.4%  Consistent with prediabetes  >or=6.5%  Consistent with diabetes    High levels of fetal hemoglobin interfere with the HbA1C  assay. Heterozygous hemoglobin variants (HbS, HgC, etc)do  not significantly interfere with this  "assay.   However, presence of multiple variants may affect accuracy.     11/10/2022 7.1 (H) 4.0 - 5.6 % Final     Comment:     ADA Screening Guidelines:  5.7-6.4%  Consistent with prediabetes  >or=6.5%  Consistent with diabetes    High levels of fetal hemoglobin interfere with the HbA1C  assay. Heterozygous hemoglobin variants (HbS, HgC, etc)do  not significantly interfere with this assay.   However, presence of multiple variants may affect accuracy.                 Objective:      Vitals:    02/07/24 0813   BP: (!) 140/80   Pulse: (!) 55   Weight: 133 kg (293 lb 3.4 oz)   Height: 6' 2" (1.88 m)   PainSc: 0-No pain        Physical Exam  Constitutional:       General: He is not in acute distress.     Appearance: Normal appearance. He is well-developed and normal weight.   HENT:      Head: Normocephalic and atraumatic.   Cardiovascular:      Pulses:           Dorsalis pedis pulses are 2+ on the right side and 2+ on the left side.        Posterior tibial pulses are 1+ on the right side and 1+ on the left side.      Comments: CFT< 3 secs all toes bilateral foot, skin temp warm to cool bilateral foot, no hair growth bilateral lower extremity, no edema to bilateral lower extremities    Pulmonary:      Effort: Pulmonary effort is normal.   Musculoskeletal:         General: No swelling.      Right foot: Normal range of motion. Deformity present. No foot drop.      Left foot: Normal range of motion. Deformity present. No foot drop.      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     5/5 muscle strength with DF/PF/Inv/Ev bilateral.       Feet:      Right foot:      Protective Sensation: 5 sites tested.  0 sites sensed.      Skin integrity: Ulcer, skin breakdown, callus and dry skin present. No erythema.      Toenail Condition: Right toenails are abnormally thick.      Left foot:      Protective Sensation: 5 sites tested.  0 sites sensed.      Skin integrity: Callus and dry skin " present. No ulcer, skin breakdown or erythema.      Toenail Condition: Left toenails are abnormally thick.   Skin:     General: Skin is warm and dry.      Capillary Refill: Capillary refill takes 2 to 3 seconds.      Findings: No erythema.      Nails: There is no clubbing.      Comments: Ulceration location:   Right sub 5th MPJ ulceration measuring 0.1 cm in diameter by 0.1 cm in depth to subcutaneous tissue post debridement.  Unchanged since last visit.  Left Plantar foot ulceration healed.      Left 5th digit ulceration noted measuring 0.5 cm x 0.3 cm x 0.2 cm to the level of the muscle tissue probing to periosteal tissue.  Previous bone biopsy taken.  Sutures were loose and removed today.   Neurological:      Mental Status: He is alert and oriented to person, place, and time.      Sensory: Sensory deficit present.      Motor: No weakness.      Deep Tendon Reflexes:      Reflex Scores:       Patellar reflexes are 2+ on the right side and 2+ on the left side.       Achilles reflexes are 2+ on the right side and 2+ on the left side.     Comments: Diminished/loss of protective sensation all toes bilateral to 10 gram monofilament.   Psychiatric:         Mood and Affect: Mood normal.         Behavior: Behavior normal.         Thought Content: Thought content normal.       1.25.24          Wound Details:    Location:  Left 5th digit     Type of Debridement:  Excisional to the level of muscle tissue       Length (cm):  0.3 cm       Width (cm):  0.3 cm       Depth (cm):  0.3 cm       Percent Debrided (%):  100    Healed ulceration plantar left foot.    Assessment:       Encounter Diagnoses   Name Primary?    Type II diabetes mellitus with neurological manifestations Yes    Foot ulcer, right, with fat layer exposed     Osteomyelitis, unspecified site, unspecified type          Plan:       Billy was seen today for wound check.    Diagnoses and all orders for this visit:    Type II diabetes mellitus with neurological  "manifestations    Foot ulcer, right, with fat layer exposed    Osteomyelitis, unspecified site, unspecified type    Other orders  -     terbinafine HCL (LAMISIL) 250 mg tablet; Take 1 tablet (250 mg total) by mouth once daily.          Independent review of patients previous clinical notes    Reviewed current medication(s), and lab(s) specific to foot ailment(s) with patient in detail. All questions answered     The nature of the condition, options for management, as well as potential risks and complications were discussed in detail with patient. Patient was amenable to my recommendations and left my office fully informed and will follow up as instructed or sooner if necessary.      Patient seen with Infectious Disease today, he will begin 6 weeks of doxycycline therapy by mouth.  He will also be on Lamisil therapy for 2-4 weeks for possible tinea pedis.    Wound Debridement    Performed by: Mingo Melara DPM   Authorized by: Patient    Time out: Immediately prior to procedure a "time out" was called to verify the correct patient, procedure, equipment, support staff and site/side marked as required.   Consent Done?:  Yes (Verbal)  Local anesthesia used?: No       Wound Details:    Location:  Left 5th digit     Type of Debridement:  Excisional to the level of muscle tissue       Length (cm):  0.3 cm       Width (cm):  0.3 cm       Depth (cm):  0.3 cm       Percent Debrided (%):  100             Depth of debridement:  Subcutaneous tissue to the level muscle tissue    Tissue debrided:  Dermis, Epidermis and Subcutaneous to the level muscle tissue    Devitalized tissue debrided:  Biofilm, Callus and Necrotic/Eschar    Instruments:  Blade, Curette and Nippers     Bleeding:  Minimal  Hemostasis Achieved: Yes    Method Used:  Pressure  Patient tolerance:  Patient tolerated the procedure well with no immediate complications      "

## 2024-02-07 NOTE — PROGRESS NOTES
Infectious Diseases Progress Note  Subjective:     Chief Complaint: diabetic foot infection    HPI: Billy Rivera is a 58 y.o. male with insulin dependent diabetes (8.7%), CAD, HLD and prior foot osteomyelitis who was seen for new diabetic foot infection. He is s/p left foot hallux amputation, partial left 2nd toe amputation, right 5th toe amputation. Last time seen in ID clinic he was treated for right third toe osteomyelitis s/p right third toe amputation with cultures prior to amputation growing MSSA and pasturella. He completed 6 weeks of ceftriaxone on 1/5/2023.     He states his new left 5th toe wound is after hitting his foot on his couch to avoid stepping on his cat. He didn't notice any purulence, but his feet are always wrapped. He goes to podiatry clinic once per week for evaluation and dressing changes. Doesn't notice drainage through dressings. He spends a great deal of time on his feet as he is a . He denies fevers/chills.       Immunization History   Administered Date(s) Administered    COVID-19, MRNA, LN-S, PF (MODERNA FULL 0.5 ML DOSE) 03/30/2021, 04/28/2021    Influenza 01/18/2019    Influenza - Intradermal - Quadrivalent - PF 10/24/2013    Influenza - Quadrivalent - PF *Preferred* (6 months and older) 11/08/2010, 09/28/2020, 12/07/2022    Pneumococcal Conjugate - 13 Valent 09/28/2020    Pneumococcal Polysaccharide - 23 Valent 06/29/2017    Tdap 09/08/2015    Zoster Recombinant 09/28/2020        Review of Systems   Constitutional: Negative for fever and malaise/fatigue.   Skin:  Positive for poor wound healing.   All other systems reviewed and are negative.       Past Medical History:   Diagnosis Date    Allergy     CAD (coronary artery disease), native coronary artery 6/25/2013    Cancer     COVID-19 virus detected 9/1/2020    Diabetes mellitus     Diabetes mellitus, type 2     Disorder of kidney and ureter     Heart attack 04/2012    Hx of colon cancer, stage I     Hyperlipidemia      Hypertension     Muscular pain     post-op after colonoscopy    NSTEMI May 2013 - peak troponin 0.22 5/22/2013    MICHAELA (obstructive sleep apnea)     Retinopathy due to secondary diabetes      Past Surgical History:   Procedure Laterality Date    COLONOSCOPY N/A 2/17/2017    Procedure: COLONOSCOPY;  Surgeon: Simon Gomez MD;  Location: [facility] (4TH FLR);  Service: Endoscopy;  Laterality: N/A;    CORONARY ANGIOPLASTY      INCISION AND DRAINAGE FOOT Right 6/5/2018    Procedure: INCISION AND DRAINAGE, FOOT;  Surgeon: Bridget Santana DPM;  Location: [facility] OR 1ST FLR;  Service: Podiatry;  Laterality: Right;  Request mini C arm in room. request wound vac with medium black sponge    INCISION AND DRAINAGE FOOT Right 6/7/2018    Procedure: INCISION AND DRAINAGE, FOOT;  Surgeon: Emelia Brock DPM;  Location: [facility] OR 2ND FLR;  Service: Podiatry;  Laterality: Right;    INCISION AND DRAINAGE FOOT Left 9/4/2020    Procedure: INCISION AND DRAINAGE, FOOT;  Surgeon: Ainsley Powell DPM;  Location: [facility] OR 2ND FLR;  Service: Podiatry;  Laterality: Left;    INCISION AND DRAINAGE FOOT Left 12/22/2020    Procedure: INCISION AND DRAINAGE, FOOT;  Surgeon: Ainsley Powell DPM;  Location: [facility] OR 2ND FLR;  Service: Podiatry;  Laterality: Left;  May need micro choice  Need Jam shidi needles  Stretcher OK      INCISION AND DRAINAGE OF ABSCESS Right 6/2/2018    Procedure: INCISION AND DRAINAGE, ABSCESS;  Surgeon: Bridget Santana DPM;  Location: [facility] OR 2ND FLR;  Service: General;  Laterality: Right;    OSTEOTOMY OF METATARSAL BONE  9/4/2020    Procedure: OSTEOTOMY, METATARSAL BONE, 1st METATARSAL RESECTION;  Surgeon: Ainsley Powell DPM;  Location: [facility] OR 2ND FLR;  Service: Podiatry;;    REMOVAL OF FOREIGN BODY FROM FOOT Right 6/7/2018    Procedure: REMOVAL, FOREIGN BODY, FOOT;  Surgeon: Emelia Brock DPM;  Location: [facility] OR 2ND FLR;  Service: Podiatry;  Laterality: Right;    TOE AMPUTATION Left 7/4/2020    Procedure: AMPUTATION, TOE;   Surgeon: Bridget Santana DPM;  Location: [facility] OR 2ND FLR;  Service: Podiatry;  Laterality: Left;    TOE AMPUTATION Left 10/14/2020    Procedure: AMPUTATION, TOE;  Surgeon: Mingo Melara DPM;  Location: [facility] OR 2ND FLR;  Service: Podiatry;  Laterality: Left;  stretcher OK    TOE AMPUTATION Right 6/9/2022    Procedure: AMPUTATION, TOE - Dr. Melara @ [place] in am;  Surgeon: Mingo Melara DPM;  Location: [facility] OR 1ST FLR;  Service: Podiatry;  Laterality: Right;    TOE AMPUTATION Right 11/23/2022    Procedure: AMPUTATION, TOE;  Surgeon: Hansa Robertson DPM;  Location: [facility] OR 2ND FLR;  Service: Podiatry;  Laterality: Right;    TOE AMPUTATION Right 11/25/2022    Procedure: AMPUTATION, TOE;  Surgeon: Chris Groves DPM;  [facility];  Service: Podiatry;  Laterality: Right;    TONSILLECTOMY       Family History   Problem Relation Age of Onset    Heart disease Mother     Diabetes Mother     Diabetes Father     Heart disease Brother     Diabetes Maternal Grandmother     Diabetes Maternal Grandfather     Diabetes Paternal Grandmother     Diabetes Paternal Grandfather     Anesthesia problems Neg Hx      Social History     Tobacco Use    Smoking status: Never     Passive exposure: Never    Smokeless tobacco: Never   Substance Use Topics    Alcohol use: Yes     Comment: rare x1 beer-     Drug use: No       Objective:     Physical Exam  Vitals reviewed.   Constitutional:       Appearance: Normal appearance.   HENT:      Head: Normocephalic.   Pulmonary:      Effort: Pulmonary effort is normal. No respiratory distress.   Musculoskeletal:      Comments: Left 5th toe with wound that probes to bone  Right 4th and 5th digits amputated, wound to lateral aspect of right foot   Skin:     Comments: Dry, scaling skin noted to feet, erythema noted to ankles bilaterally   Neurological:      General: No focal deficit present.      Mental Status: He is alert and oriented to person, place, and time.    Psychiatric:         Mood and Affect: Mood normal.         Behavior: Behavior normal.         Data:    All data, including recent labs, radiology, and pathology, has been independently reviewed.    Micro/pathology:  1/3/24 aerobic bone culture: tetracycline susceptible MSSA  1/3/24 anaerobic bone culture: no growth   1/3/24 bone pathology: chronic inflammation but negative for acute osteomyelitis     Radiology:    No results found in the last 24 hours.     Assessment:  Billy Rivera is a 58 y.o. male with insulin dependent diabetes (8.7%), CAD, HLD and prior foot osteomyelitis who was seen for new diabetic foot infection. First noted to have left 5th digit ulceration around 12/6/23. Probed to bone on exam 1/3/24 so bone biopsy completed. Cultures growing MSSA, pathology negative for acute osteomyelitis. Received 10 day course of doxycyline 1/11/24. Given that the wound probes to bone for >1 month with positive bone culture, will treat as osteomyelitis. Discussed adverse effects of doxycycline, patient is amenable to taking.   - PO doxycycline 100 mg BID x 6 weeks  - CMP, CBC and CRP today   - Discussed with Dr. Melara adding home health dressing changes to decrease retained moisture and for treatment of likely interdigital dermatophyte infection with topical terbinafine     Follow up in 3 weeks    The total time for evaluation and management services performed on 2/7/24 was greater than 45 minutes.     Nila Davis MD  Infectious Disease

## 2024-02-08 NOTE — PROGRESS NOTES
Subjective:      Patient ID: Billy Rivear is a 58 y.o. male.    Chief Complaint:  Follow-up wound care, right foot.      Billy is a 58 y.o. male who presents to the clinic for evaluation and treatment of high risk feet. Billy has a past medical history of Allergy, CAD (coronary artery disease), native coronary artery (6/25/2013), Cancer, COVID-19 virus detected (9/1/2020), Diabetes mellitus, Diabetes mellitus, type 2, Disorder of kidney and ureter, Heart attack (04/2012), colon cancer, stage I, Hyperlipidemia, Hypertension, Muscular pain, NSTEMI May 2013 - peak troponin 0.22 (5/22/2013), MICHAELA (obstructive sleep apnea), and Retinopathy due to secondary diabetes.  Patient presents for follow-up wound care.    Ongoing left 5th digital ulceration noted.  Waiting for Infectious Disease recommendations.  Previous bone biopsy with Staph aureus/positive osteomyelitis/acute inflammation.        PCP: DRE Baker MD    Date Last Seen by PCP:   Chief Complaint   Patient presents with    Wound Check     Bilateral        History of Trauma: negative  Sign of Infection: none        Hemoglobin A1C   Date Value Ref Range Status   07/20/2023 8.7 (H) 4.0 - 5.6 % Final     Comment:     ADA Screening Guidelines:  5.7-6.4%  Consistent with prediabetes  >or=6.5%  Consistent with diabetes    High levels of fetal hemoglobin interfere with the HbA1C  assay. Heterozygous hemoglobin variants (HbS, HgC, etc)do  not significantly interfere with this assay.   However, presence of multiple variants may affect accuracy.     04/06/2023 9.4 (H) 4.0 - 5.6 % Final     Comment:     ADA Screening Guidelines:  5.7-6.4%  Consistent with prediabetes  >or=6.5%  Consistent with diabetes    High levels of fetal hemoglobin interfere with the HbA1C  assay. Heterozygous hemoglobin variants (HbS, HgC, etc)do  not significantly interfere with this assay.   However, presence of multiple variants may affect accuracy.     11/10/2022 7.1 (H) 4.0 -  "5.6 % Final     Comment:     ADA Screening Guidelines:  5.7-6.4%  Consistent with prediabetes  >or=6.5%  Consistent with diabetes    High levels of fetal hemoglobin interfere with the HbA1C  assay. Heterozygous hemoglobin variants (HbS, HgC, etc)do  not significantly interfere with this assay.   However, presence of multiple variants may affect accuracy.             Objective:      Vitals:    01/30/24 0947   BP: 139/80   Pulse: (!) 50   Height: 6' 2" (1.88 m)   PainSc: 0-No pain        Physical Exam  Constitutional:       General: He is not in acute distress.     Appearance: Normal appearance. He is well-developed and normal weight.   HENT:      Head: Normocephalic and atraumatic.   Cardiovascular:      Pulses:           Dorsalis pedis pulses are 2+ on the right side and 2+ on the left side.        Posterior tibial pulses are 1+ on the right side and 1+ on the left side.      Comments: CFT< 3 secs all toes bilateral foot, skin temp warm to cool bilateral foot, no hair growth bilateral lower extremity, no edema to bilateral lower extremities    Pulmonary:      Effort: Pulmonary effort is normal.   Musculoskeletal:         General: No swelling.      Right foot: Normal range of motion. Deformity present. No foot drop.      Left foot: Normal range of motion. Deformity present. No foot drop.      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     5/5 muscle strength with DF/PF/Inv/Ev bilateral.       Feet:      Right foot:      Protective Sensation: 5 sites tested.  0 sites sensed.      Skin integrity: Ulcer, skin breakdown, callus and dry skin present. No erythema.      Toenail Condition: Right toenails are abnormally thick.      Left foot:      Protective Sensation: 5 sites tested.  0 sites sensed.      Skin integrity: Callus and dry skin present. No ulcer, skin breakdown or erythema.      Toenail Condition: Left toenails are abnormally thick.   Skin:     General: Skin is warm " and dry.      Capillary Refill: Capillary refill takes 2 to 3 seconds.      Findings: No erythema.      Nails: There is no clubbing.      Comments: Ulceration location:   Right sub 5th MPJ ulceration measuring 0.1 cm in diameter by 0.1 cm in depth to subcutaneous tissue post debridement.  Unchanged since last visit.  Left Plantar foot ulceration healed.      Left 5th digit ulceration noted measuring 0.3 cm x 0.3 cm x 0.2 cm to the level of the muscle tissue probing to periosteal tissue.  Previous bone biopsy taken.  Sutures were loose and removed today.   Neurological:      Mental Status: He is alert and oriented to person, place, and time.      Sensory: Sensory deficit present.      Motor: No weakness.      Deep Tendon Reflexes:      Reflex Scores:       Patellar reflexes are 2+ on the right side and 2+ on the left side.       Achilles reflexes are 2+ on the right side and 2+ on the left side.     Comments: Diminished/loss of protective sensation all toes bilateral to 10 gram monofilament.   Psychiatric:         Mood and Affect: Mood normal.         Behavior: Behavior normal.         Thought Content: Thought content normal.       1.25.24      Healed ulceration right sub 2nd metatarsophalangeal joint.  Right plantar lateral foot with a 0.3x0.4x0.2 cm ulceration.  Granular base.  Dry intact periwound.  No malodor.  No drainage.  No deep probe.    Healed ulceration plantar left foot.    Assessment:       Encounter Diagnoses   Name Primary?    Type II diabetes mellitus with neurological manifestations Yes    Foot ulcer, right, with fat layer exposed     Osteomyelitis, unspecified site, unspecified type     Ulcer of left foot with necrosis of muscle          Plan:       Billy was seen today for wound check.    Diagnoses and all orders for this visit:    Type II diabetes mellitus with neurological manifestations    Foot ulcer, right, with fat layer exposed    Osteomyelitis, unspecified site, unspecified type    Ulcer  "of left foot with necrosis of muscle      Independent review of patients previous clinical notes    Reviewed current medication(s), and lab(s) specific to foot ailment(s) with patient in detail. All questions answered     Wound Debridement    Performed by: Mingo Melara DPM   Authorized by: Patient    Time out: Immediately prior to procedure a "time out" was called to verify the correct patient, procedure, equipment, support staff and site/side marked as required.   Consent Done?:  Yes (Verbal)  Local anesthesia used?: No       Wound Details:    Location:  Left 5th digit     Type of Debridement:  Excisional       Length (cm):  0.3       Width (cm):  0.3       Depth (cm):  0.2       Percent Debrided (%):  100             Depth of debridement:  Subcutaneous tissue    Tissue debrided:  Dermis, Epidermis and Subcutaneous    Devitalized tissue debrided:  Biofilm, Callus and Necrotic/Eschar    Instruments:  Blade, Curette and Nippers     Bleeding:  Minimal  Hemostasis Achieved: Yes    Method Used:  Pressure  Patient tolerance:  Patient tolerated the procedure well with no immediate complications    The nature of the condition, options for management, as well as potential risks and complications were discussed in detail with patient. Patient was amenable to my recommendations and left my office fully informed and will follow up as instructed or sooner if necessary.      Wound care discussed in detail.  Follow-up in 1 week.  "

## 2024-02-09 ENCOUNTER — LAB VISIT (OUTPATIENT)
Dept: LAB | Facility: HOSPITAL | Age: 59
End: 2024-02-09
Attending: STUDENT IN AN ORGANIZED HEALTH CARE EDUCATION/TRAINING PROGRAM
Payer: COMMERCIAL

## 2024-02-09 ENCOUNTER — PATIENT MESSAGE (OUTPATIENT)
Dept: ADMINISTRATIVE | Facility: HOSPITAL | Age: 59
End: 2024-02-09
Payer: COMMERCIAL

## 2024-02-09 DIAGNOSIS — M86.172 OTHER ACUTE OSTEOMYELITIS OF LEFT FOOT: ICD-10-CM

## 2024-02-09 LAB
ALBUMIN SERPL BCP-MCNC: 3.2 G/DL (ref 3.5–5.2)
ALP SERPL-CCNC: 97 U/L (ref 55–135)
ALT SERPL W/O P-5'-P-CCNC: 45 U/L (ref 10–44)
ANION GAP SERPL CALC-SCNC: 8 MMOL/L (ref 8–16)
AST SERPL-CCNC: 31 U/L (ref 10–40)
BASOPHILS # BLD AUTO: 0.05 K/UL (ref 0–0.2)
BASOPHILS NFR BLD: 0.6 % (ref 0–1.9)
BILIRUB SERPL-MCNC: 0.4 MG/DL (ref 0.1–1)
BUN SERPL-MCNC: 19 MG/DL (ref 6–20)
CALCIUM SERPL-MCNC: 9 MG/DL (ref 8.7–10.5)
CHLORIDE SERPL-SCNC: 102 MMOL/L (ref 95–110)
CO2 SERPL-SCNC: 26 MMOL/L (ref 23–29)
CREAT SERPL-MCNC: 1.4 MG/DL (ref 0.5–1.4)
CRP SERPL-MCNC: 9.2 MG/L (ref 0–8.2)
DIFFERENTIAL METHOD BLD: ABNORMAL
EOSINOPHIL # BLD AUTO: 0.2 K/UL (ref 0–0.5)
EOSINOPHIL NFR BLD: 2.8 % (ref 0–8)
ERYTHROCYTE [DISTWIDTH] IN BLOOD BY AUTOMATED COUNT: 13.3 % (ref 11.5–14.5)
EST. GFR  (NO RACE VARIABLE): 57.9 ML/MIN/1.73 M^2
GLUCOSE SERPL-MCNC: 307 MG/DL (ref 70–110)
HCT VFR BLD AUTO: 45.4 % (ref 40–54)
HGB BLD-MCNC: 15.1 G/DL (ref 14–18)
IMM GRANULOCYTES # BLD AUTO: 0.07 K/UL (ref 0–0.04)
IMM GRANULOCYTES NFR BLD AUTO: 0.9 % (ref 0–0.5)
LYMPHOCYTES # BLD AUTO: 1.8 K/UL (ref 1–4.8)
LYMPHOCYTES NFR BLD: 21.9 % (ref 18–48)
MCH RBC QN AUTO: 28.7 PG (ref 27–31)
MCHC RBC AUTO-ENTMCNC: 33.3 G/DL (ref 32–36)
MCV RBC AUTO: 86 FL (ref 82–98)
MONOCYTES # BLD AUTO: 0.7 K/UL (ref 0.3–1)
MONOCYTES NFR BLD: 8.8 % (ref 4–15)
NEUTROPHILS # BLD AUTO: 5.3 K/UL (ref 1.8–7.7)
NEUTROPHILS NFR BLD: 65 % (ref 38–73)
NRBC BLD-RTO: 0 /100 WBC
PLATELET # BLD AUTO: 222 K/UL (ref 150–450)
PMV BLD AUTO: 10 FL (ref 9.2–12.9)
POTASSIUM SERPL-SCNC: 4.6 MMOL/L (ref 3.5–5.1)
PROT SERPL-MCNC: 7.3 G/DL (ref 6–8.4)
RBC # BLD AUTO: 5.26 M/UL (ref 4.6–6.2)
SODIUM SERPL-SCNC: 136 MMOL/L (ref 136–145)
WBC # BLD AUTO: 8.1 K/UL (ref 3.9–12.7)

## 2024-02-09 PROCEDURE — 85025 COMPLETE CBC W/AUTO DIFF WBC: CPT | Performed by: STUDENT IN AN ORGANIZED HEALTH CARE EDUCATION/TRAINING PROGRAM

## 2024-02-09 PROCEDURE — 86140 C-REACTIVE PROTEIN: CPT | Performed by: STUDENT IN AN ORGANIZED HEALTH CARE EDUCATION/TRAINING PROGRAM

## 2024-02-09 PROCEDURE — 80053 COMPREHEN METABOLIC PANEL: CPT | Performed by: STUDENT IN AN ORGANIZED HEALTH CARE EDUCATION/TRAINING PROGRAM

## 2024-02-09 PROCEDURE — 36415 COLL VENOUS BLD VENIPUNCTURE: CPT | Performed by: STUDENT IN AN ORGANIZED HEALTH CARE EDUCATION/TRAINING PROGRAM

## 2024-02-15 ENCOUNTER — OFFICE VISIT (OUTPATIENT)
Dept: PODIATRY | Facility: CLINIC | Age: 59
End: 2024-02-15
Payer: COMMERCIAL

## 2024-02-15 VITALS
HEART RATE: 56 BPM | BODY MASS INDEX: 37.92 KG/M2 | SYSTOLIC BLOOD PRESSURE: 140 MMHG | DIASTOLIC BLOOD PRESSURE: 88 MMHG | HEIGHT: 74 IN | WEIGHT: 295.44 LBS

## 2024-02-15 DIAGNOSIS — L97.523 ULCER OF LEFT FOOT WITH NECROSIS OF MUSCLE: ICD-10-CM

## 2024-02-15 DIAGNOSIS — E11.49 TYPE II DIABETES MELLITUS WITH NEUROLOGICAL MANIFESTATIONS: Primary | ICD-10-CM

## 2024-02-15 DIAGNOSIS — L97.512 FOOT ULCER, RIGHT, WITH FAT LAYER EXPOSED: ICD-10-CM

## 2024-02-15 PROCEDURE — 11042 DBRDMT SUBQ TIS 1ST 20SQCM/<: CPT | Mod: S$GLB,,, | Performed by: PODIATRIST

## 2024-02-15 PROCEDURE — 3008F BODY MASS INDEX DOCD: CPT | Mod: CPTII,S$GLB,, | Performed by: PODIATRIST

## 2024-02-15 PROCEDURE — 3079F DIAST BP 80-89 MM HG: CPT | Mod: CPTII,S$GLB,, | Performed by: PODIATRIST

## 2024-02-15 PROCEDURE — 99213 OFFICE O/P EST LOW 20 MIN: CPT | Mod: 25,S$GLB,, | Performed by: PODIATRIST

## 2024-02-15 PROCEDURE — 3077F SYST BP >= 140 MM HG: CPT | Mod: CPTII,S$GLB,, | Performed by: PODIATRIST

## 2024-02-15 PROCEDURE — 99999 PR PBB SHADOW E&M-EST. PATIENT-LVL IV: CPT | Mod: PBBFAC,,, | Performed by: PODIATRIST

## 2024-02-16 ENCOUNTER — PATIENT MESSAGE (OUTPATIENT)
Dept: INTERNAL MEDICINE | Facility: CLINIC | Age: 59
End: 2024-02-16
Payer: COMMERCIAL

## 2024-02-21 ENCOUNTER — OFFICE VISIT (OUTPATIENT)
Dept: PODIATRY | Facility: CLINIC | Age: 59
End: 2024-02-21
Payer: COMMERCIAL

## 2024-02-21 ENCOUNTER — LAB VISIT (OUTPATIENT)
Dept: LAB | Facility: HOSPITAL | Age: 59
End: 2024-02-21
Attending: STUDENT IN AN ORGANIZED HEALTH CARE EDUCATION/TRAINING PROGRAM
Payer: COMMERCIAL

## 2024-02-21 VITALS — HEIGHT: 74 IN | BODY MASS INDEX: 37.92 KG/M2 | WEIGHT: 295.44 LBS

## 2024-02-21 DIAGNOSIS — L97.523 ULCER OF LEFT FOOT WITH NECROSIS OF MUSCLE: ICD-10-CM

## 2024-02-21 DIAGNOSIS — L97.512 FOOT ULCER, RIGHT, WITH FAT LAYER EXPOSED: ICD-10-CM

## 2024-02-21 DIAGNOSIS — E11.49 TYPE II DIABETES MELLITUS WITH NEUROLOGICAL MANIFESTATIONS: Primary | ICD-10-CM

## 2024-02-21 DIAGNOSIS — M86.172 OTHER ACUTE OSTEOMYELITIS OF LEFT FOOT: ICD-10-CM

## 2024-02-21 LAB
ALBUMIN SERPL BCP-MCNC: 3.1 G/DL (ref 3.5–5.2)
ALP SERPL-CCNC: 96 U/L (ref 55–135)
ALT SERPL W/O P-5'-P-CCNC: 52 U/L (ref 10–44)
ANION GAP SERPL CALC-SCNC: 11 MMOL/L (ref 8–16)
AST SERPL-CCNC: 30 U/L (ref 10–40)
BASOPHILS # BLD AUTO: 0.04 K/UL (ref 0–0.2)
BASOPHILS NFR BLD: 0.6 % (ref 0–1.9)
BILIRUB SERPL-MCNC: 0.4 MG/DL (ref 0.1–1)
BUN SERPL-MCNC: 20 MG/DL (ref 6–20)
CALCIUM SERPL-MCNC: 8.8 MG/DL (ref 8.7–10.5)
CHLORIDE SERPL-SCNC: 99 MMOL/L (ref 95–110)
CO2 SERPL-SCNC: 23 MMOL/L (ref 23–29)
CREAT SERPL-MCNC: 1.1 MG/DL (ref 0.5–1.4)
CRP SERPL-MCNC: 20.7 MG/L (ref 0–8.2)
DIFFERENTIAL METHOD BLD: ABNORMAL
EOSINOPHIL # BLD AUTO: 0.2 K/UL (ref 0–0.5)
EOSINOPHIL NFR BLD: 2.8 % (ref 0–8)
ERYTHROCYTE [DISTWIDTH] IN BLOOD BY AUTOMATED COUNT: 13.6 % (ref 11.5–14.5)
EST. GFR  (NO RACE VARIABLE): >60 ML/MIN/1.73 M^2
GLUCOSE SERPL-MCNC: 414 MG/DL (ref 70–110)
HCT VFR BLD AUTO: 41.7 % (ref 40–54)
HGB BLD-MCNC: 14.2 G/DL (ref 14–18)
IMM GRANULOCYTES # BLD AUTO: 0.06 K/UL (ref 0–0.04)
IMM GRANULOCYTES NFR BLD AUTO: 0.9 % (ref 0–0.5)
LYMPHOCYTES # BLD AUTO: 0.8 K/UL (ref 1–4.8)
LYMPHOCYTES NFR BLD: 12.8 % (ref 18–48)
MCH RBC QN AUTO: 29.1 PG (ref 27–31)
MCHC RBC AUTO-ENTMCNC: 34.1 G/DL (ref 32–36)
MCV RBC AUTO: 86 FL (ref 82–98)
MONOCYTES # BLD AUTO: 0.7 K/UL (ref 0.3–1)
MONOCYTES NFR BLD: 10.4 % (ref 4–15)
NEUTROPHILS # BLD AUTO: 4.7 K/UL (ref 1.8–7.7)
NEUTROPHILS NFR BLD: 72.5 % (ref 38–73)
NRBC BLD-RTO: 0 /100 WBC
PLATELET # BLD AUTO: 189 K/UL (ref 150–450)
PMV BLD AUTO: 10.7 FL (ref 9.2–12.9)
POTASSIUM SERPL-SCNC: 4.1 MMOL/L (ref 3.5–5.1)
PROT SERPL-MCNC: 6.8 G/DL (ref 6–8.4)
RBC # BLD AUTO: 4.88 M/UL (ref 4.6–6.2)
SODIUM SERPL-SCNC: 133 MMOL/L (ref 136–145)
WBC # BLD AUTO: 6.47 K/UL (ref 3.9–12.7)

## 2024-02-21 PROCEDURE — 11043 DBRDMT MUSC&/FSCA 1ST 20/<: CPT | Mod: S$GLB,,, | Performed by: PODIATRIST

## 2024-02-21 PROCEDURE — 99213 OFFICE O/P EST LOW 20 MIN: CPT | Mod: 25,S$GLB,, | Performed by: PODIATRIST

## 2024-02-21 PROCEDURE — 80053 COMPREHEN METABOLIC PANEL: CPT | Performed by: STUDENT IN AN ORGANIZED HEALTH CARE EDUCATION/TRAINING PROGRAM

## 2024-02-21 PROCEDURE — 85025 COMPLETE CBC W/AUTO DIFF WBC: CPT | Performed by: STUDENT IN AN ORGANIZED HEALTH CARE EDUCATION/TRAINING PROGRAM

## 2024-02-21 PROCEDURE — 86140 C-REACTIVE PROTEIN: CPT | Performed by: STUDENT IN AN ORGANIZED HEALTH CARE EDUCATION/TRAINING PROGRAM

## 2024-02-21 PROCEDURE — 36415 COLL VENOUS BLD VENIPUNCTURE: CPT | Performed by: STUDENT IN AN ORGANIZED HEALTH CARE EDUCATION/TRAINING PROGRAM

## 2024-02-21 PROCEDURE — 3008F BODY MASS INDEX DOCD: CPT | Mod: CPTII,S$GLB,, | Performed by: PODIATRIST

## 2024-02-21 PROCEDURE — 99999 PR PBB SHADOW E&M-EST. PATIENT-LVL IV: CPT | Mod: PBBFAC,,, | Performed by: PODIATRIST

## 2024-02-26 NOTE — PROGRESS NOTES
Subjective:      Patient ID: Billy Rivera is a 58 y.o. male.    Chief Complaint:  Follow-up wound care, right foot.      Billy is a 58 y.o. male who presents to the clinic for evaluation and treatment of high risk feet. Billy has a past medical history of Allergy, CAD (coronary artery disease), native coronary artery (6/25/2013), Cancer, COVID-19 virus detected (9/1/2020), Diabetes mellitus, Diabetes mellitus, type 2, Disorder of kidney and ureter, Heart attack (04/2012), colon cancer, stage I, Hyperlipidemia, Hypertension, Muscular pain, NSTEMI May 2013 - peak troponin 0.22 (5/22/2013), MICHAELA (obstructive sleep apnea), and Retinopathy due to secondary diabetes.  Patient presents for follow-up wound care.    Ongoing left 5th digital ulceration noted.  Issues with osteomyelitis right 5th digit/current antibiotic therapy per Infectious Disease.            PCP: DRE Baker MD    Date Last Seen by PCP:   Chief Complaint   Patient presents with    Wound Check     Bilateral   Left 5th digit drainage        History of Trauma: negative  Sign of Infection: none        Hemoglobin A1C   Date Value Ref Range Status   07/20/2023 8.7 (H) 4.0 - 5.6 % Final     Comment:     ADA Screening Guidelines:  5.7-6.4%  Consistent with prediabetes  >or=6.5%  Consistent with diabetes    High levels of fetal hemoglobin interfere with the HbA1C  assay. Heterozygous hemoglobin variants (HbS, HgC, etc)do  not significantly interfere with this assay.   However, presence of multiple variants may affect accuracy.     04/06/2023 9.4 (H) 4.0 - 5.6 % Final     Comment:     ADA Screening Guidelines:  5.7-6.4%  Consistent with prediabetes  >or=6.5%  Consistent with diabetes    High levels of fetal hemoglobin interfere with the HbA1C  assay. Heterozygous hemoglobin variants (HbS, HgC, etc)do  not significantly interfere with this assay.   However, presence of multiple variants may affect accuracy.     11/10/2022 7.1 (H) 4.0 - 5.6 %  "Final     Comment:     ADA Screening Guidelines:  5.7-6.4%  Consistent with prediabetes  >or=6.5%  Consistent with diabetes    High levels of fetal hemoglobin interfere with the HbA1C  assay. Heterozygous hemoglobin variants (HbS, HgC, etc)do  not significantly interfere with this assay.   However, presence of multiple variants may affect accuracy.                 Objective:      Vitals:    02/15/24 1053   BP: (!) 140/88   Pulse: (!) 56   Weight: 134 kg (295 lb 6.7 oz)   Height: 6' 2" (1.88 m)   PainSc: 0-No pain        Physical Exam  Constitutional:       General: He is not in acute distress.     Appearance: Normal appearance. He is well-developed and normal weight.   HENT:      Head: Normocephalic and atraumatic.   Cardiovascular:      Pulses:           Dorsalis pedis pulses are 2+ on the right side and 2+ on the left side.        Posterior tibial pulses are 1+ on the right side and 1+ on the left side.      Comments: CFT< 3 secs all toes bilateral foot, skin temp warm to cool bilateral foot, no hair growth bilateral lower extremity, no edema to bilateral lower extremities    Pulmonary:      Effort: Pulmonary effort is normal.   Musculoskeletal:         General: No swelling.      Right foot: Normal range of motion. Deformity present. No foot drop.      Left foot: Normal range of motion. Deformity present. No foot drop.      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     5/5 muscle strength with DF/PF/Inv/Ev bilateral.       Feet:      Right foot:      Protective Sensation: 5 sites tested.  0 sites sensed.      Skin integrity: Ulcer, skin breakdown, callus and dry skin present. No erythema.      Toenail Condition: Right toenails are abnormally thick.      Left foot:      Protective Sensation: 5 sites tested.  0 sites sensed.      Skin integrity: Callus and dry skin present. No ulcer, skin breakdown or erythema.      Toenail Condition: Left toenails are abnormally thick. "   Skin:     General: Skin is warm and dry.      Capillary Refill: Capillary refill takes 2 to 3 seconds.      Findings: No erythema.      Nails: There is no clubbing.      Comments: Ulceration location:   Right sub 5th MPJ ulceration measuring 0.1 cm in diameter by 0.1 cm in depth to subcutaneous tissue post debridement.  Unchanged since last visit.  Left Plantar foot ulceration healed.      Left 5th digit ulceration noted measuring 0.5 cm x 0.3 cm x 0.2 cm to the level of the muscle tissue probing to periosteal tissue.  Previous bone biopsy taken.  Sutures were loose and removed today.   Neurological:      Mental Status: He is alert and oriented to person, place, and time.      Sensory: Sensory deficit present.      Motor: No weakness.      Deep Tendon Reflexes:      Reflex Scores:       Patellar reflexes are 2+ on the right side and 2+ on the left side.       Achilles reflexes are 2+ on the right side and 2+ on the left side.     Comments: Diminished/loss of protective sensation all toes bilateral to 10 gram monofilament.   Psychiatric:         Mood and Affect: Mood normal.         Behavior: Behavior normal.         Thought Content: Thought content normal.           Wound Details:    Location:  Left 5th digit     Type of Debridement:  Excisional to the level of muscle tissue       Length (cm):  0.3 cm       Width (cm):  0.2 cm       Depth (cm):  0.1 cm       Percent Debrided (%):  100    Healed ulceration plantar left foot.    Assessment:       Encounter Diagnoses   Name Primary?    Type II diabetes mellitus with neurological manifestations Yes    Foot ulcer, right, with fat layer exposed     Ulcer of left foot with necrosis of muscle          Plan:       Billy was seen today for wound check.    Diagnoses and all orders for this visit:    Type II diabetes mellitus with neurological manifestations    Foot ulcer, right, with fat layer exposed    Ulcer of left foot with necrosis of muscle          Independent  "review of patients previous clinical notes    Reviewed current medication(s), and lab(s) specific to foot ailment(s) with patient in detail. All questions answered     The nature of the condition, options for management, as well as potential risks and complications were discussed in detail with patient. Patient was amenable to my recommendations and left my office fully informed and will follow up as instructed or sooner if necessary.      Patient seen with Infectious Disease today, he will begin 6 weeks of doxycycline therapy by mouth.  He will also be on Lamisil therapy for 2-4 weeks for possible tinea pedis.    Wound Debridement    Performed by: Mingo Melara DPM   Authorized by: Patient    Time out: Immediately prior to procedure a "time out" was called to verify the correct patient, procedure, equipment, support staff and site/side marked as required.   Consent Done?:  Yes (Verbal)  Local anesthesia used?: No       Wound Details:    Location:  Left 5th digit     Type of Debridement:  Excisional to the level of muscle tissue       Length (cm):  0.3 cm       Width (cm):  0.2 cm       Depth (cm):  0.1 cm       Percent Debrided (%):  100             Depth of debridement:  Subcutaneous tissue to the level muscle tissue    Tissue debrided:  Dermis, Epidermis and Subcutaneous to the level muscle tissue    Devitalized tissue debrided:  Biofilm, Callus and Necrotic/Eschar    Instruments:  Blade, Curette and Nippers     Bleeding:  Minimal  Hemostasis Achieved: Yes    Method Used:  Pressure  Patient tolerance:  Patient tolerated the procedure well with no immediate complications    The nature of the condition, options for management, as well as potential risks and complications were discussed in detail with patient. Patient was amenable to my recommendations and left my office fully informed and will follow up as instructed or sooner if necessary.      Wound care discussed in detail.  Follow-up in 1 week.    "

## 2024-02-28 ENCOUNTER — OFFICE VISIT (OUTPATIENT)
Dept: PODIATRY | Facility: CLINIC | Age: 59
End: 2024-02-28
Payer: COMMERCIAL

## 2024-02-28 ENCOUNTER — TELEPHONE (OUTPATIENT)
Dept: PODIATRY | Facility: CLINIC | Age: 59
End: 2024-02-28
Payer: COMMERCIAL

## 2024-02-28 ENCOUNTER — PATIENT MESSAGE (OUTPATIENT)
Dept: INFECTIOUS DISEASES | Facility: CLINIC | Age: 59
End: 2024-02-28
Payer: COMMERCIAL

## 2024-02-28 VITALS
DIASTOLIC BLOOD PRESSURE: 88 MMHG | WEIGHT: 295.44 LBS | HEIGHT: 74 IN | HEART RATE: 56 BPM | SYSTOLIC BLOOD PRESSURE: 140 MMHG | BODY MASS INDEX: 37.92 KG/M2

## 2024-02-28 DIAGNOSIS — L97.512 FOOT ULCER, RIGHT, WITH FAT LAYER EXPOSED: ICD-10-CM

## 2024-02-28 DIAGNOSIS — E11.49 TYPE II DIABETES MELLITUS WITH NEUROLOGICAL MANIFESTATIONS: Primary | ICD-10-CM

## 2024-02-28 PROCEDURE — 1159F MED LIST DOCD IN RCRD: CPT | Mod: CPTII,S$GLB,, | Performed by: PODIATRIST

## 2024-02-28 PROCEDURE — 3079F DIAST BP 80-89 MM HG: CPT | Mod: CPTII,S$GLB,, | Performed by: PODIATRIST

## 2024-02-28 PROCEDURE — 99999 PR PBB SHADOW E&M-EST. PATIENT-LVL IV: CPT | Mod: PBBFAC,,, | Performed by: PODIATRIST

## 2024-02-28 PROCEDURE — 99213 OFFICE O/P EST LOW 20 MIN: CPT | Mod: 25,S$GLB,, | Performed by: PODIATRIST

## 2024-02-28 PROCEDURE — 3077F SYST BP >= 140 MM HG: CPT | Mod: CPTII,S$GLB,, | Performed by: PODIATRIST

## 2024-02-28 PROCEDURE — 11042 DBRDMT SUBQ TIS 1ST 20SQCM/<: CPT | Mod: LT,S$GLB,, | Performed by: PODIATRIST

## 2024-02-28 PROCEDURE — 3008F BODY MASS INDEX DOCD: CPT | Mod: CPTII,S$GLB,, | Performed by: PODIATRIST

## 2024-02-28 NOTE — TELEPHONE ENCOUNTER
----- Message from Cora Rivera sent at 2/28/2024 10:13 AM CST -----  Regarding: Appt  Contact: EX [phone]  Jasymne from  calling to speak with someone in provider office regarding pt appt . Please call back   EX [phone]

## 2024-03-03 NOTE — PROGRESS NOTES
Subjective:      Patient ID: Billy Rivera is a 58 y.o. male.    Chief Complaint:  Follow-up wound care, right foot.      Billy is a 58 y.o. male who presents to the clinic for evaluation and treatment of high risk feet. Billy has a past medical history of Allergy, CAD (coronary artery disease), native coronary artery (6/25/2013), Cancer, COVID-19 virus detected (9/1/2020), Diabetes mellitus, Diabetes mellitus, type 2, Disorder of kidney and ureter, Heart attack (04/2012), colon cancer, stage I, Hyperlipidemia, Hypertension, Muscular pain, NSTEMI May 2013 - peak troponin 0.22 (5/22/2013), MICHAELA (obstructive sleep apnea), and Retinopathy due to secondary diabetes.  Patient presents for follow-up wound care.  Ongoing left 5th digital ulceration noted.  Issues with osteomyelitis right 5th digit/current antibiotic therapy per Infectious Disease no other new complaints today.  No pain.            PCP: DRE Baker MD    Date Last Seen by PCP:   Chief Complaint   Patient presents with    Wound Check     Bilateral       Foot Pain     Right midfoot        History of Trauma: negative  Sign of Infection: none        Hemoglobin A1C   Date Value Ref Range Status   07/20/2023 8.7 (H) 4.0 - 5.6 % Final     Comment:     ADA Screening Guidelines:  5.7-6.4%  Consistent with prediabetes  >or=6.5%  Consistent with diabetes    High levels of fetal hemoglobin interfere with the HbA1C  assay. Heterozygous hemoglobin variants (HbS, HgC, etc)do  not significantly interfere with this assay.   However, presence of multiple variants may affect accuracy.     04/06/2023 9.4 (H) 4.0 - 5.6 % Final     Comment:     ADA Screening Guidelines:  5.7-6.4%  Consistent with prediabetes  >or=6.5%  Consistent with diabetes    High levels of fetal hemoglobin interfere with the HbA1C  assay. Heterozygous hemoglobin variants (HbS, HgC, etc)do  not significantly interfere with this assay.   However, presence of multiple variants may affect  "accuracy.     11/10/2022 7.1 (H) 4.0 - 5.6 % Final     Comment:     ADA Screening Guidelines:  5.7-6.4%  Consistent with prediabetes  >or=6.5%  Consistent with diabetes    High levels of fetal hemoglobin interfere with the HbA1C  assay. Heterozygous hemoglobin variants (HbS, HgC, etc)do  not significantly interfere with this assay.   However, presence of multiple variants may affect accuracy.                 Objective:      Vitals:    02/21/24 0914   Weight: 134 kg (295 lb 6.7 oz)   Height: 6' 2" (1.88 m)   PainSc:   5   PainLoc: Foot        Physical Exam  Constitutional:       General: He is not in acute distress.     Appearance: Normal appearance. He is well-developed and normal weight.   HENT:      Head: Normocephalic and atraumatic.   Cardiovascular:      Pulses:           Dorsalis pedis pulses are 2+ on the right side and 2+ on the left side.        Posterior tibial pulses are 1+ on the right side and 1+ on the left side.      Comments: CFT< 3 secs all toes bilateral foot, skin temp warm to cool bilateral foot, no hair growth bilateral lower extremity, no edema to bilateral lower extremities    Pulmonary:      Effort: Pulmonary effort is normal.   Musculoskeletal:         General: No swelling.      Right foot: Normal range of motion. Deformity present. No foot drop.      Left foot: Normal range of motion. Deformity present. No foot drop.      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     5/5 muscle strength with DF/PF/Inv/Ev bilateral.       Feet:      Right foot:      Protective Sensation: 5 sites tested.  0 sites sensed.      Skin integrity: Ulcer, skin breakdown, callus and dry skin present. No erythema.      Toenail Condition: Right toenails are abnormally thick.      Left foot:      Protective Sensation: 5 sites tested.  0 sites sensed.      Skin integrity: Callus and dry skin present. No ulcer, skin breakdown or erythema.      Toenail Condition: Left toenails " are abnormally thick.   Skin:     General: Skin is warm and dry.      Capillary Refill: Capillary refill takes 2 to 3 seconds.      Findings: No erythema.      Nails: There is no clubbing.      Comments: Ulceration location:   Right sub 5th MPJ ulceration measuring 0.1 cm in diameter by 0.1 cm in depth to subcutaneous tissue post debridement.  Unchanged since last visit.  Left Plantar foot ulceration healed.      Left 5th digit ulceration noted measuring 0.5 cm x 0.3 cm x 0.2 cm to the level of the muscle tissue probing to periosteal tissue.  Previous bone biopsy taken.  Sutures were loose and removed today.   Neurological:      Mental Status: He is alert and oriented to person, place, and time.      Sensory: Sensory deficit present.      Motor: No weakness.      Deep Tendon Reflexes:      Reflex Scores:       Patellar reflexes are 2+ on the right side and 2+ on the left side.       Achilles reflexes are 2+ on the right side and 2+ on the left side.     Comments: Diminished/loss of protective sensation all toes bilateral to 10 gram monofilament.   Psychiatric:         Mood and Affect: Mood normal.         Behavior: Behavior normal.         Thought Content: Thought content normal.           Wound Details:    Location:  Left 5th digit     Type of Debridement:  Excisional to the level of muscle tissue       Length (cm):  0.2 cm       Width (cm):  0.2 cm       Depth (cm):  0.1 cm       Percent Debrided (%):  100    Healed ulceration plantar left foot.    Assessment:       Encounter Diagnoses   Name Primary?    Type II diabetes mellitus with neurological manifestations Yes    Foot ulcer, right, with fat layer exposed     Ulcer of left foot with necrosis of muscle          Plan:       Billy was seen today for wound check and foot pain.    Diagnoses and all orders for this visit:    Type II diabetes mellitus with neurological manifestations    Foot ulcer, right, with fat layer exposed    Ulcer of left foot with necrosis  "of muscle          Independent review of patients previous clinical notes    Reviewed current medication(s), and lab(s) specific to foot ailment(s) with patient in detail. All questions answered     The nature of the condition, options for management, as well as potential risks and complications were discussed in detail with patient. Patient was amenable to my recommendations and left my office fully informed and will follow up as instructed or sooner if necessary.        Wound Debridement    Performed by: Mingo Melara DPM   Authorized by: Patient    Time out: Immediately prior to procedure a "time out" was called to verify the correct patient, procedure, equipment, support staff and site/side marked as required.   Consent Done?:  Yes (Verbal)  Local anesthesia used?: No       Wound Details:    Location:  Left 5th digit     Type of Debridement:  Excisional to the level of muscle tissue       Length (cm):  0.2 cm       Width (cm):  0.2 cm       Depth (cm):  0.1 cm       Percent Debrided (%):  100             Depth of debridement:  Subcutaneous tissue to the level muscle tissue    Tissue debrided:  Dermis, Epidermis and Subcutaneous to the level muscle tissue    Devitalized tissue debrided:  Biofilm, Callus and Necrotic/Eschar    Instruments:  Blade, Curette and Nippers     Bleeding:  Minimal  Hemostasis Achieved: Yes    Method Used:  Pressure  Patient tolerance:  Patient tolerated the procedure well with no immediate complications    The nature of the condition, options for management, as well as potential risks and complications were discussed in detail with patient. Patient was amenable to my recommendations and left my office fully informed and will follow up as instructed or sooner if necessary.      Wound care discussed in detail.  Follow-up in 1 week.    "

## 2024-03-04 NOTE — PROGRESS NOTES
Subjective:      Patient ID: Billy Rivera is a 58 y.o. male.    Chief Complaint:  Follow-up wound care, right foot.      Billy is a 58 y.o. male who presents to the clinic for evaluation and treatment of high risk feet. Billy has a past medical history of Allergy, CAD (coronary artery disease), native coronary artery (6/25/2013), Cancer, COVID-19 virus detected (9/1/2020), Diabetes mellitus, Diabetes mellitus, type 2, Disorder of kidney and ureter, Heart attack (04/2012), colon cancer, stage I, Hyperlipidemia, Hypertension, Muscular pain, NSTEMI May 2013 - peak troponin 0.22 (5/22/2013), MICHAELA (obstructive sleep apnea), and Retinopathy due to secondary diabetes.  Patient presents for follow-up wound care.  Ongoing left 5th digital ulceration noted.  Much improved.            PCP: DRE Baker MD    Date Last Seen by PCP:   Chief Complaint   Patient presents with    Wound Check     Bilateral        History of Trauma: negative  Sign of Infection: none        Hemoglobin A1C   Date Value Ref Range Status   07/20/2023 8.7 (H) 4.0 - 5.6 % Final     Comment:     ADA Screening Guidelines:  5.7-6.4%  Consistent with prediabetes  >or=6.5%  Consistent with diabetes    High levels of fetal hemoglobin interfere with the HbA1C  assay. Heterozygous hemoglobin variants (HbS, HgC, etc)do  not significantly interfere with this assay.   However, presence of multiple variants may affect accuracy.     04/06/2023 9.4 (H) 4.0 - 5.6 % Final     Comment:     ADA Screening Guidelines:  5.7-6.4%  Consistent with prediabetes  >or=6.5%  Consistent with diabetes    High levels of fetal hemoglobin interfere with the HbA1C  assay. Heterozygous hemoglobin variants (HbS, HgC, etc)do  not significantly interfere with this assay.   However, presence of multiple variants may affect accuracy.     11/10/2022 7.1 (H) 4.0 - 5.6 % Final     Comment:     ADA Screening Guidelines:  5.7-6.4%  Consistent with prediabetes  >or=6.5%   "Consistent with diabetes    High levels of fetal hemoglobin interfere with the HbA1C  assay. Heterozygous hemoglobin variants (HbS, HgC, etc)do  not significantly interfere with this assay.   However, presence of multiple variants may affect accuracy.                 Objective:      Vitals:    02/28/24 0920   BP: (!) 140/88   Pulse: (!) 56   Weight: 134 kg (295 lb 6.7 oz)   Height: 6' 2" (1.88 m)   PainSc: 0-No pain        Physical Exam  Constitutional:       General: He is not in acute distress.     Appearance: Normal appearance. He is well-developed and normal weight.   HENT:      Head: Normocephalic and atraumatic.   Cardiovascular:      Pulses:           Dorsalis pedis pulses are 2+ on the right side and 2+ on the left side.        Posterior tibial pulses are 1+ on the right side and 1+ on the left side.      Comments: CFT< 3 secs all toes bilateral foot, skin temp warm to cool bilateral foot, no hair growth bilateral lower extremity, no edema to bilateral lower extremities    Pulmonary:      Effort: Pulmonary effort is normal.   Musculoskeletal:         General: No swelling.      Right foot: Normal range of motion. Deformity present. No foot drop.      Left foot: Normal range of motion. Deformity present. No foot drop.      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     5/5 muscle strength with DF/PF/Inv/Ev bilateral.       Feet:      Right foot:      Protective Sensation: 5 sites tested.  0 sites sensed.      Skin integrity: Ulcer, skin breakdown, callus and dry skin present. No erythema.      Toenail Condition: Right toenails are abnormally thick.      Left foot:      Protective Sensation: 5 sites tested.  0 sites sensed.      Skin integrity: Callus and dry skin present. No ulcer, skin breakdown or erythema.      Toenail Condition: Left toenails are abnormally thick.   Skin:     General: Skin is warm and dry.      Capillary Refill: Capillary refill takes 2 to 3 " seconds.      Findings: No erythema.      Nails: There is no clubbing.      Comments: Ulceration location:   Right sub 5th MPJ ulceration measuring 0.1 cm in diameter by 0.1 cm in depth to subcutaneous tissue post debridement.  Unchanged since last visit.  Left Plantar foot ulceration healed.      Left 5th digit ulceration noted measuring 0.5 cm x 0.3 cm x 0.2 cm to the level of the muscle tissue probing to periosteal tissue.  Previous bone biopsy taken.  Sutures were loose and removed today.   Neurological:      Mental Status: He is alert and oriented to person, place, and time.      Sensory: Sensory deficit present.      Motor: No weakness.      Deep Tendon Reflexes:      Reflex Scores:       Patellar reflexes are 2+ on the right side and 2+ on the left side.       Achilles reflexes are 2+ on the right side and 2+ on the left side.     Comments: Diminished/loss of protective sensation all toes bilateral to 10 gram monofilament.   Psychiatric:         Mood and Affect: Mood normal.         Behavior: Behavior normal.         Thought Content: Thought content normal.           Wound Details:    Location:  Left 5th digit     Type of Debridement:  Excisional to the level of muscle tissue       Length (cm):  0.2 cm       Width (cm):  0.1 cm       Depth (cm):  0.1 cm       Percent Debrided (%):  100    Healed ulceration plantar left foot.    Assessment:       Encounter Diagnoses   Name Primary?    Type II diabetes mellitus with neurological manifestations Yes    Foot ulcer, right, with fat layer exposed          Plan:       Billy was seen today for wound check.    Diagnoses and all orders for this visit:    Type II diabetes mellitus with neurological manifestations    Foot ulcer, right, with fat layer exposed        Independent review of patients previous clinical notes    Reviewed current medication(s), and lab(s) specific to foot ailment(s) with patient in detail. All questions answered     The nature of the condition,  "options for management, as well as potential risks and complications were discussed in detail with patient. Patient was amenable to my recommendations and left my office fully informed and will follow up as instructed or sooner if necessary.        Wound Debridement    Performed by: Mingo Melara DPM   Authorized by: Patient    Time out: Immediately prior to procedure a "time out" was called to verify the correct patient, procedure, equipment, support staff and site/side marked as required.   Consent Done?:  Yes (Verbal)  Local anesthesia used?: No       Wound Details:    Location:  Left 5th digit     Type of Debridement:  Excisional to the level of muscle tissue       Length (cm):  0.2 cm       Width (cm):  0.1 cm       Depth (cm):  0.1 cm       Percent Debrided (%):  100             Depth of debridement:  Subcutaneous tissue to the level muscle tissue    Tissue debrided:  Dermis, Epidermis and Subcutaneous to the level muscle tissue    Devitalized tissue debrided:  Biofilm, Callus and Necrotic/Eschar    Instruments:  Blade, Curette and Nippers     Bleeding:  Minimal  Hemostasis Achieved: Yes    Method Used:  Pressure  Patient tolerance:  Patient tolerated the procedure well with no immediate complications    The nature of the condition, options for management, as well as potential risks and complications were discussed in detail with patient. Patient was amenable to my recommendations and left my office fully informed and will follow up as instructed or sooner if necessary.      Wound care discussed in detail.  Follow-up in 1 week.    "

## 2024-03-07 ENCOUNTER — OFFICE VISIT (OUTPATIENT)
Dept: PODIATRY | Facility: CLINIC | Age: 59
End: 2024-03-07
Payer: COMMERCIAL

## 2024-03-07 VITALS
HEIGHT: 74 IN | WEIGHT: 295.44 LBS | HEART RATE: 50 BPM | BODY MASS INDEX: 37.92 KG/M2 | SYSTOLIC BLOOD PRESSURE: 158 MMHG | DIASTOLIC BLOOD PRESSURE: 92 MMHG

## 2024-03-07 DIAGNOSIS — L97.523 ULCER OF LEFT FOOT WITH NECROSIS OF MUSCLE: ICD-10-CM

## 2024-03-07 DIAGNOSIS — E11.51 TYPE 2 DIABETES MELLITUS WITH DIABETIC PERIPHERAL ANGIOPATHY WITHOUT GANGRENE, WITH LONG-TERM CURRENT USE OF INSULIN: Chronic | ICD-10-CM

## 2024-03-07 DIAGNOSIS — E11.49 TYPE II DIABETES MELLITUS WITH NEUROLOGICAL MANIFESTATIONS: Primary | ICD-10-CM

## 2024-03-07 DIAGNOSIS — L97.512 FOOT ULCER, RIGHT, WITH FAT LAYER EXPOSED: ICD-10-CM

## 2024-03-07 DIAGNOSIS — Z79.4 TYPE 2 DIABETES MELLITUS WITH DIABETIC PERIPHERAL ANGIOPATHY WITHOUT GANGRENE, WITH LONG-TERM CURRENT USE OF INSULIN: Chronic | ICD-10-CM

## 2024-03-07 PROCEDURE — 3077F SYST BP >= 140 MM HG: CPT | Mod: CPTII,S$GLB,, | Performed by: PODIATRIST

## 2024-03-07 PROCEDURE — 99213 OFFICE O/P EST LOW 20 MIN: CPT | Mod: S$GLB,,, | Performed by: PODIATRIST

## 2024-03-07 PROCEDURE — 99999 PR PBB SHADOW E&M-EST. PATIENT-LVL III: CPT | Mod: PBBFAC,,, | Performed by: PODIATRIST

## 2024-03-07 PROCEDURE — 3080F DIAST BP >= 90 MM HG: CPT | Mod: CPTII,S$GLB,, | Performed by: PODIATRIST

## 2024-03-07 PROCEDURE — 3008F BODY MASS INDEX DOCD: CPT | Mod: CPTII,S$GLB,, | Performed by: PODIATRIST

## 2024-03-07 PROCEDURE — 4010F ACE/ARB THERAPY RXD/TAKEN: CPT | Mod: CPTII,S$GLB,, | Performed by: PODIATRIST

## 2024-03-08 RX ORDER — ATORVASTATIN CALCIUM 80 MG/1
80 TABLET, FILM COATED ORAL NIGHTLY
Qty: 30 TABLET | Refills: 6 | Status: SHIPPED | OUTPATIENT
Start: 2024-03-08 | End: 2024-04-03 | Stop reason: SDUPTHER

## 2024-03-08 NOTE — TELEPHONE ENCOUNTER
Refill Routing Note   Medication(s) are not appropriate for processing by [facility] for the following reason(s):        Non-participating provider  Responsible provider unclear    [facility] action(s):  Route             Appointments  past 12m or future 3m with PCP    Date Provider   Last Visit   7/20/2023 Akua Powell, AMY   Next Visit   Visit date not found Akua Powell, NP   ED visits in past 90 days: 0        Note composed:7:47 AM 03/08/2024

## 2024-03-14 ENCOUNTER — PATIENT MESSAGE (OUTPATIENT)
Dept: INFECTIOUS DISEASES | Facility: CLINIC | Age: 59
End: 2024-03-14
Payer: COMMERCIAL

## 2024-03-14 ENCOUNTER — OFFICE VISIT (OUTPATIENT)
Dept: PODIATRY | Facility: CLINIC | Age: 59
End: 2024-03-14
Payer: COMMERCIAL

## 2024-03-14 VITALS
WEIGHT: 295.44 LBS | HEART RATE: 50 BPM | BODY MASS INDEX: 37.92 KG/M2 | DIASTOLIC BLOOD PRESSURE: 84 MMHG | HEIGHT: 74 IN | SYSTOLIC BLOOD PRESSURE: 146 MMHG

## 2024-03-14 DIAGNOSIS — E11.49 TYPE II DIABETES MELLITUS WITH NEUROLOGICAL MANIFESTATIONS: Primary | ICD-10-CM

## 2024-03-14 DIAGNOSIS — L97.523 ULCER OF LEFT FOOT WITH NECROSIS OF MUSCLE: ICD-10-CM

## 2024-03-14 DIAGNOSIS — L97.512 FOOT ULCER, RIGHT, WITH FAT LAYER EXPOSED: ICD-10-CM

## 2024-03-14 PROCEDURE — 99999 PR PBB SHADOW E&M-EST. PATIENT-LVL IV: CPT | Mod: PBBFAC,,, | Performed by: PODIATRIST

## 2024-03-14 PROCEDURE — 3077F SYST BP >= 140 MM HG: CPT | Mod: CPTII,S$GLB,, | Performed by: PODIATRIST

## 2024-03-14 PROCEDURE — 3008F BODY MASS INDEX DOCD: CPT | Mod: CPTII,S$GLB,, | Performed by: PODIATRIST

## 2024-03-14 PROCEDURE — 3079F DIAST BP 80-89 MM HG: CPT | Mod: CPTII,S$GLB,, | Performed by: PODIATRIST

## 2024-03-14 PROCEDURE — 99213 OFFICE O/P EST LOW 20 MIN: CPT | Mod: S$GLB,,, | Performed by: PODIATRIST

## 2024-03-14 PROCEDURE — 4010F ACE/ARB THERAPY RXD/TAKEN: CPT | Mod: CPTII,S$GLB,, | Performed by: PODIATRIST

## 2024-03-14 PROCEDURE — 1159F MED LIST DOCD IN RCRD: CPT | Mod: CPTII,S$GLB,, | Performed by: PODIATRIST

## 2024-03-16 NOTE — PROGRESS NOTES
Subjective:      Patient ID: Billy Rivera is a 58 y.o. male.    Chief Complaint:  Follow-up wound care, right foot.      Billy is a 58 y.o. male who presents to the clinic for evaluation and treatment of high risk feet. Billy has a past medical history of Allergy, CAD (coronary artery disease), native coronary artery (6/25/2013), Cancer, COVID-19 virus detected (9/1/2020), Diabetes mellitus, Diabetes mellitus, type 2, Disorder of kidney and ureter, Heart attack (04/2012), colon cancer, stage I, Hyperlipidemia, Hypertension, Muscular pain, NSTEMI May 2013 - peak troponin 0.22 (5/22/2013), MICHAELA (obstructive sleep apnea), and Retinopathy due to secondary diabetes.  Patient presents for follow-up wound care.  Ongoing left 5th digital ulceration noted.  Much improved.  No new complaints.            PCP: DRE Baker MD    Date Last Seen by PCP:   Chief Complaint   Patient presents with    Wound Check     Bilateral        History of Trauma: negative  Sign of Infection: none        Hemoglobin A1C   Date Value Ref Range Status   07/20/2023 8.7 (H) 4.0 - 5.6 % Final     Comment:     ADA Screening Guidelines:  5.7-6.4%  Consistent with prediabetes  >or=6.5%  Consistent with diabetes    High levels of fetal hemoglobin interfere with the HbA1C  assay. Heterozygous hemoglobin variants (HbS, HgC, etc)do  not significantly interfere with this assay.   However, presence of multiple variants may affect accuracy.     04/06/2023 9.4 (H) 4.0 - 5.6 % Final     Comment:     ADA Screening Guidelines:  5.7-6.4%  Consistent with prediabetes  >or=6.5%  Consistent with diabetes    High levels of fetal hemoglobin interfere with the HbA1C  assay. Heterozygous hemoglobin variants (HbS, HgC, etc)do  not significantly interfere with this assay.   However, presence of multiple variants may affect accuracy.     11/10/2022 7.1 (H) 4.0 - 5.6 % Final     Comment:     ADA Screening Guidelines:  5.7-6.4%  Consistent with  "prediabetes  >or=6.5%  Consistent with diabetes    High levels of fetal hemoglobin interfere with the HbA1C  assay. Heterozygous hemoglobin variants (HbS, HgC, etc)do  not significantly interfere with this assay.   However, presence of multiple variants may affect accuracy.                 Objective:      Vitals:    03/07/24 0853   BP: (!) 158/92   Pulse: (!) 50   Weight: 134 kg (295 lb 6.7 oz)   Height: 6' 2" (1.88 m)   PainSc: 0-No pain   PainLoc: Foot        Physical Exam  Constitutional:       General: He is not in acute distress.     Appearance: Normal appearance. He is well-developed and normal weight.   HENT:      Head: Normocephalic and atraumatic.   Cardiovascular:      Pulses:           Dorsalis pedis pulses are 2+ on the right side and 2+ on the left side.        Posterior tibial pulses are 1+ on the right side and 1+ on the left side.      Comments: CFT< 3 secs all toes bilateral foot, skin temp warm to cool bilateral foot, no hair growth bilateral lower extremity, no edema to bilateral lower extremities    Pulmonary:      Effort: Pulmonary effort is normal.   Musculoskeletal:         General: No swelling.      Right foot: Normal range of motion. Deformity present. No foot drop.      Left foot: Normal range of motion. Deformity present. No foot drop.      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     5/5 muscle strength with DF/PF/Inv/Ev bilateral.       Feet:      Right foot:      Protective Sensation: 5 sites tested.  0 sites sensed.      Skin integrity: Ulcer, skin breakdown, callus and dry skin present. No erythema.      Toenail Condition: Right toenails are abnormally thick.      Left foot:      Protective Sensation: 5 sites tested.  0 sites sensed.      Skin integrity: Callus and dry skin present. No ulcer, skin breakdown or erythema.      Toenail Condition: Left toenails are abnormally thick.   Skin:     General: Skin is warm and dry.      Capillary " Refill: Capillary refill takes 2 to 3 seconds.      Findings: No erythema.      Nails: There is no clubbing.      Comments: Ulceration location:   Right sub 5th MPJ ulceration measuring 0.1 cm in diameter by 0.1 cm in depth to subcutaneous tissue post debridement.  Unchanged since last visit.  Left Plantar foot ulceration healed.      Left 5th digit ulceration noted measuring 0.5 cm x 0.3 cm x 0.2 cm to the level of the muscle tissue probing to periosteal tissue.  Previous bone biopsy taken.  Sutures were loose and removed today.   Neurological:      Mental Status: He is alert and oriented to person, place, and time.      Sensory: Sensory deficit present.      Motor: No weakness.      Deep Tendon Reflexes:      Reflex Scores:       Patellar reflexes are 2+ on the right side and 2+ on the left side.       Achilles reflexes are 2+ on the right side and 2+ on the left side.     Comments: Diminished/loss of protective sensation all toes bilateral to 10 gram monofilament.   Psychiatric:         Mood and Affect: Mood normal.         Behavior: Behavior normal.         Thought Content: Thought content normal.         Ulcerations appear healed bilateral.    Assessment:       Encounter Diagnoses   Name Primary?    Type II diabetes mellitus with neurological manifestations Yes    Foot ulcer, right, with fat layer exposed     Ulcer of left foot with necrosis of muscle          Plan:       Billy was seen today for wound check.    Diagnoses and all orders for this visit:    Type II diabetes mellitus with neurological manifestations    Foot ulcer, right, with fat layer exposed    Ulcer of left foot with necrosis of muscle        Independent review of patients previous clinical notes    Reviewed current medication(s), and lab(s) specific to foot ailment(s) with patient in detail. All questions answered     The nature of the condition, options for management, as well as potential risks and complications were discussed in detail  with patient. Patient was amenable to my recommendations and left my office fully informed and will follow up as instructed or sooner if necessary.        Wound care discussed in detail.  Follow-up in 1 week.

## 2024-03-20 ENCOUNTER — LAB VISIT (OUTPATIENT)
Dept: LAB | Facility: HOSPITAL | Age: 59
End: 2024-03-20
Attending: STUDENT IN AN ORGANIZED HEALTH CARE EDUCATION/TRAINING PROGRAM
Payer: COMMERCIAL

## 2024-03-20 DIAGNOSIS — M86.172 OTHER ACUTE OSTEOMYELITIS OF LEFT FOOT: ICD-10-CM

## 2024-03-20 LAB
ALBUMIN SERPL BCP-MCNC: 3.5 G/DL (ref 3.5–5.2)
ALP SERPL-CCNC: 110 U/L (ref 55–135)
ALT SERPL W/O P-5'-P-CCNC: 43 U/L (ref 10–44)
ANION GAP SERPL CALC-SCNC: 7 MMOL/L (ref 8–16)
AST SERPL-CCNC: 26 U/L (ref 10–40)
BASOPHILS # BLD AUTO: 0.07 K/UL (ref 0–0.2)
BASOPHILS NFR BLD: 0.9 % (ref 0–1.9)
BILIRUB SERPL-MCNC: 0.5 MG/DL (ref 0.1–1)
BUN SERPL-MCNC: 20 MG/DL (ref 6–20)
CALCIUM SERPL-MCNC: 9.6 MG/DL (ref 8.7–10.5)
CHLORIDE SERPL-SCNC: 97 MMOL/L (ref 95–110)
CO2 SERPL-SCNC: 28 MMOL/L (ref 23–29)
CREAT SERPL-MCNC: 1.4 MG/DL (ref 0.5–1.4)
CRP SERPL-MCNC: 17.6 MG/L (ref 0–8.2)
DIFFERENTIAL METHOD BLD: ABNORMAL
EOSINOPHIL # BLD AUTO: 0.4 K/UL (ref 0–0.5)
EOSINOPHIL NFR BLD: 4.4 % (ref 0–8)
ERYTHROCYTE [DISTWIDTH] IN BLOOD BY AUTOMATED COUNT: 13.7 % (ref 11.5–14.5)
EST. GFR  (NO RACE VARIABLE): 57.9 ML/MIN/1.73 M^2
GLUCOSE SERPL-MCNC: 394 MG/DL (ref 70–110)
HCT VFR BLD AUTO: 48.4 % (ref 40–54)
HGB BLD-MCNC: 16.4 G/DL (ref 14–18)
IMM GRANULOCYTES # BLD AUTO: 0.06 K/UL (ref 0–0.04)
IMM GRANULOCYTES NFR BLD AUTO: 0.7 % (ref 0–0.5)
LYMPHOCYTES # BLD AUTO: 1.7 K/UL (ref 1–4.8)
LYMPHOCYTES NFR BLD: 20.9 % (ref 18–48)
MCH RBC QN AUTO: 29 PG (ref 27–31)
MCHC RBC AUTO-ENTMCNC: 33.9 G/DL (ref 32–36)
MCV RBC AUTO: 86 FL (ref 82–98)
MONOCYTES # BLD AUTO: 0.7 K/UL (ref 0.3–1)
MONOCYTES NFR BLD: 8.3 % (ref 4–15)
NEUTROPHILS # BLD AUTO: 5.3 K/UL (ref 1.8–7.7)
NEUTROPHILS NFR BLD: 64.8 % (ref 38–73)
NRBC BLD-RTO: 0 /100 WBC
PLATELET # BLD AUTO: 203 K/UL (ref 150–450)
PMV BLD AUTO: 11.4 FL (ref 9.2–12.9)
POTASSIUM SERPL-SCNC: 4.7 MMOL/L (ref 3.5–5.1)
PROT SERPL-MCNC: 7.7 G/DL (ref 6–8.4)
RBC # BLD AUTO: 5.65 M/UL (ref 4.6–6.2)
SODIUM SERPL-SCNC: 132 MMOL/L (ref 136–145)
WBC # BLD AUTO: 8.2 K/UL (ref 3.9–12.7)

## 2024-03-20 PROCEDURE — 80053 COMPREHEN METABOLIC PANEL: CPT | Performed by: STUDENT IN AN ORGANIZED HEALTH CARE EDUCATION/TRAINING PROGRAM

## 2024-03-20 PROCEDURE — 36415 COLL VENOUS BLD VENIPUNCTURE: CPT | Performed by: STUDENT IN AN ORGANIZED HEALTH CARE EDUCATION/TRAINING PROGRAM

## 2024-03-20 PROCEDURE — 86140 C-REACTIVE PROTEIN: CPT | Performed by: STUDENT IN AN ORGANIZED HEALTH CARE EDUCATION/TRAINING PROGRAM

## 2024-03-20 PROCEDURE — 85025 COMPLETE CBC W/AUTO DIFF WBC: CPT | Performed by: STUDENT IN AN ORGANIZED HEALTH CARE EDUCATION/TRAINING PROGRAM

## 2024-03-21 ENCOUNTER — PATIENT OUTREACH (OUTPATIENT)
Dept: ADMINISTRATIVE | Facility: HOSPITAL | Age: 59
End: 2024-03-21
Payer: COMMERCIAL

## 2024-03-21 NOTE — PROGRESS NOTES
Population Health Chart Review & Patient Outreach Details      Additional [facility] Health Notes:               Updates Requested / Reviewed:      Care Everywhere, , and Immunizations Reconciliation Completed or Queried: [place]         Health Maintenance Topics Overdue:      [facility] Score: 3     Eye Exam  Hemoglobin A1c  Uncontrolled BP                       Health Maintenance Topic(s) Outreach Outcomes & Actions Taken:    Primary Care Appt - Outreach Outcomes & Actions Taken  : Primary Care Appt Scheduled

## 2024-03-21 NOTE — PROGRESS NOTES
Subjective:      Patient ID: Billy Rivera is a 58 y.o. male.    Chief Complaint:  Follow-up wound care, right foot.      Billy is a 58 y.o. male who presents to the clinic for evaluation and treatment of high risk feet. Billy has a past medical history of Allergy, CAD (coronary artery disease), native coronary artery (6/25/2013), Cancer, COVID-19 virus detected (9/1/2020), Diabetes mellitus, Diabetes mellitus, type 2, Disorder of kidney and ureter, Heart attack (04/2012), colon cancer, stage I, Hyperlipidemia, Hypertension, Muscular pain, NSTEMI May 2013 - peak troponin 0.22 (5/22/2013), MICHAELA (obstructive sleep apnea), and Retinopathy due to secondary diabetes.  Patient presents for follow-up wound care.  Doing very well.            PCP: DRE Baker MD    Date Last Seen by PCP:   Chief Complaint   Patient presents with    Wound Care     B/L wound care, no drainage       History of Trauma: negative  Sign of Infection: none        Hemoglobin A1C   Date Value Ref Range Status   07/20/2023 8.7 (H) 4.0 - 5.6 % Final     Comment:     ADA Screening Guidelines:  5.7-6.4%  Consistent with prediabetes  >or=6.5%  Consistent with diabetes    High levels of fetal hemoglobin interfere with the HbA1C  assay. Heterozygous hemoglobin variants (HbS, HgC, etc)do  not significantly interfere with this assay.   However, presence of multiple variants may affect accuracy.     04/06/2023 9.4 (H) 4.0 - 5.6 % Final     Comment:     ADA Screening Guidelines:  5.7-6.4%  Consistent with prediabetes  >or=6.5%  Consistent with diabetes    High levels of fetal hemoglobin interfere with the HbA1C  assay. Heterozygous hemoglobin variants (HbS, HgC, etc)do  not significantly interfere with this assay.   However, presence of multiple variants may affect accuracy.     11/10/2022 7.1 (H) 4.0 - 5.6 % Final     Comment:     ADA Screening Guidelines:  5.7-6.4%  Consistent with prediabetes  >or=6.5%  Consistent with diabetes    High  "levels of fetal hemoglobin interfere with the HbA1C  assay. Heterozygous hemoglobin variants (HbS, HgC, etc)do  not significantly interfere with this assay.   However, presence of multiple variants may affect accuracy.                 Objective:      Vitals:    03/14/24 1423   BP: (!) 146/84   Pulse: (!) 50   Weight: 134 kg (295 lb 6.7 oz)   Height: 6' 2" (1.88 m)   PainSc: 0-No pain   PainLoc: Foot        Physical Exam  Constitutional:       General: He is not in acute distress.     Appearance: Normal appearance. He is well-developed and normal weight.   HENT:      Head: Normocephalic and atraumatic.   Cardiovascular:      Pulses:           Dorsalis pedis pulses are 2+ on the right side and 2+ on the left side.        Posterior tibial pulses are 1+ on the right side and 1+ on the left side.      Comments: CFT< 3 secs all toes bilateral foot, skin temp warm to cool bilateral foot, no hair growth bilateral lower extremity, no edema to bilateral lower extremities    Pulmonary:      Effort: Pulmonary effort is normal.   Musculoskeletal:         General: No swelling.      Right foot: Normal range of motion. Deformity present. No foot drop.      Left foot: Normal range of motion. Deformity present. No foot drop.      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     5/5 muscle strength with DF/PF/Inv/Ev bilateral.       Feet:      Right foot:      Protective Sensation: 5 sites tested.  0 sites sensed.      Skin integrity: Ulcer, skin breakdown, callus and dry skin present. No erythema.      Toenail Condition: Right toenails are abnormally thick.      Left foot:      Protective Sensation: 5 sites tested.  0 sites sensed.      Skin integrity: Callus and dry skin present. No ulcer, skin breakdown or erythema.      Toenail Condition: Left toenails are abnormally thick.   Skin:     General: Skin is warm and dry.      Capillary Refill: Capillary refill takes 2 to 3 seconds.      " Findings: No erythema.      Nails: There is no clubbing.      Comments: Ulceration location:   Bilateral foot wounds healed        Neurological:      Mental Status: He is alert and oriented to person, place, and time.      Sensory: Sensory deficit present.      Motor: No weakness.      Deep Tendon Reflexes:      Reflex Scores:       Patellar reflexes are 2+ on the right side and 2+ on the left side.       Achilles reflexes are 2+ on the right side and 2+ on the left side.     Comments: Diminished/loss of protective sensation all toes bilateral to 10 gram monofilament.   Psychiatric:         Mood and Affect: Mood normal.         Behavior: Behavior normal.         Thought Content: Thought content normal.         Ulcerations appear healed bilateral.    Assessment:       Encounter Diagnoses   Name Primary?    Type II diabetes mellitus with neurological manifestations Yes    Foot ulcer, right, with fat layer exposed     Ulcer of left foot with necrosis of muscle          Plan:       Billy was seen today for wound care.    Diagnoses and all orders for this visit:    Type II diabetes mellitus with neurological manifestations    Foot ulcer, right, with fat layer exposed    Ulcer of left foot with necrosis of muscle        Independent review of patients previous clinical notes    Reviewed current medication(s), and lab(s) specific to foot ailment(s) with patient in detail. All questions answered     The nature of the condition, options for management, as well as potential risks and complications were discussed in detail with patient. Patient was amenable to my recommendations and left my office fully informed and will follow up as instructed or sooner if necessary.        Wound care discussed in detail.  Follow-up in 4 weeks.  Transition slowly into prescription shoe gear.

## 2024-04-02 PROBLEM — M86.671 OTHER CHRONIC OSTEOMYELITIS, RIGHT ANKLE AND FOOT: Status: ACTIVE | Noted: 2024-04-02

## 2024-04-02 NOTE — PROGRESS NOTES
Subjective:      Chief Complaint: Establish Care    HPI  Mr. Billy Rivera is a 58-year-old man with poorly controlled type 2 diabetes (metformin 1000 b.i.d. and insulin via pump; formerly followed by our in office diabetic nurse practitioner), coronary artery disease, hyperlipidemia (atorvastatin 80), hypertension (lisinopril 40, amlodipine 10, and carvedilol 6.25 b.i.d.), history of osteomyelitis (bilateral feet; status post bilateral toe amputations), history of colorectal cancer (stage I; 2019), etc. presenting as a new patient to transition primary care:     Family, social, surgical Hx reviewed     Health Maintenance:  Due for diabetic eye exam, completion of annual screening labs, and routine vaccinations    Past Medical History:   Diagnosis Date    Allergy     CAD (coronary artery disease), native coronary artery 6/25/2013    Cancer     COVID-19 virus detected 9/1/2020    Diabetes mellitus     Diabetes mellitus, type 2     Disorder of kidney and ureter     Heart attack 04/2012    Hx of colon cancer, stage I     Hyperlipidemia     Hypertension     Muscular pain     post-op after colonoscopy    NSTEMI May 2013 - peak troponin 0.22 5/22/2013    MICHAELA (obstructive sleep apnea)     Retinopathy due to secondary diabetes      Past Surgical History:   Procedure Laterality Date    COLONOSCOPY N/A 2/17/2017    Procedure: COLONOSCOPY;  Surgeon: Simon Gomez MD;  Location: [facility] (4TH FLR);  Service: Endoscopy;  Laterality: N/A;    CORONARY ANGIOPLASTY      INCISION AND DRAINAGE FOOT Right 6/5/2018    Procedure: INCISION AND DRAINAGE, FOOT;  Surgeon: Bridget Santana DPM;  Location: [facility] OR 1ST FLR;  Service: Podiatry;  Laterality: Right;  Request mini C arm in room. request wound vac with medium black sponge    INCISION AND DRAINAGE FOOT Right 6/7/2018    Procedure: INCISION AND DRAINAGE, FOOT;  Surgeon: Emelia Brock DPM;  Location: [facility] OR 2ND FLR;  Service: Podiatry;  Laterality: Right;    INCISION AND DRAINAGE  FOOT Left 9/4/2020    Procedure: INCISION AND DRAINAGE, FOOT;  Surgeon: Ainsley Powell DPM;  Location: [facility] OR 2ND FLR;  Service: Podiatry;  Laterality: Left;    INCISION AND DRAINAGE FOOT Left 12/22/2020    Procedure: INCISION AND DRAINAGE, FOOT;  Surgeon: Ainsley Powell DPM;  Location: [facility] OR 2ND FLR;  Service: Podiatry;  Laterality: Left;  May need micro choice  Need Jam shidi needles  Stretcher OK      INCISION AND DRAINAGE OF ABSCESS Right 6/2/2018    Procedure: INCISION AND DRAINAGE, ABSCESS;  Surgeon: Bridget Santana DPM;  Location: [facility] OR 2ND FLR;  Service: General;  Laterality: Right;    OSTEOTOMY OF METATARSAL BONE  9/4/2020    Procedure: OSTEOTOMY, METATARSAL BONE, 1st METATARSAL RESECTION;  Surgeon: Ainsley Powell DPM;  Location: [facility] OR 2ND FLR;  Service: Podiatry;;    REMOVAL OF FOREIGN BODY FROM FOOT Right 6/7/2018    Procedure: REMOVAL, FOREIGN BODY, FOOT;  Surgeon: Emelia Brock DPM;  Location: [facility] OR 2ND FLR;  Service: Podiatry;  Laterality: Right;    TOE AMPUTATION Left 7/4/2020    Procedure: AMPUTATION, TOE;  Surgeon: Bridget Santana DPM;  Location: [facility] OR 2ND FLR;  Service: Podiatry;  Laterality: Left;    TOE AMPUTATION Left 10/14/2020    Procedure: AMPUTATION, TOE;  Surgeon: Mingo Melara DPM;  Location: [facility] OR 2ND FLR;  Service: Podiatry;  Laterality: Left;  stretcher OK    TOE AMPUTATION Right 6/9/2022    Procedure: AMPUTATION, TOE - Dr. Melara @ [place] in ;  Surgeon: Mingo Melara DPM;  Location: [facility] OR 1ST FLR;  Service: Podiatry;  Laterality: Right;    TOE AMPUTATION Right 11/23/2022    Procedure: AMPUTATION, TOE;  Surgeon: Hansa Robertson DPM;  Location: [facility] OR 2ND FLR;  Service: Podiatry;  Laterality: Right;    TOE AMPUTATION Right 11/25/2022    Procedure: AMPUTATION, TOE;  Surgeon: Chris Groves DPM;  Location: [facility] OR 2ND FLR;  Service: Podiatry;  Laterality: Right;    TONSILLECTOMY       Family History   Problem Relation Age of Onset    Heart  disease Mother     Diabetes Mother     Diabetes Father     Heart disease Brother     Diabetes Maternal Grandmother     Diabetes Maternal Grandfather     Diabetes Paternal Grandmother     Diabetes Paternal Grandfather     Anesthesia problems Neg Hx      Social History     Socioeconomic History    Marital status:    Tobacco Use    Smoking status: Never     Passive exposure: Never    Smokeless tobacco: Never   Substance and Sexual Activity    Alcohol use: Yes     Comment: rare x1 beer-     Drug use: No    Sexual activity: Not Currently     Review of patient's allergies indicates:   Allergen Reactions    Penicillins Other (See Comments)     PCN allergy as a child - was told he went into a coma. Tolerates Cefazolin without adverse reactions    Shellfish containing products      Other reaction(s): Unknown    Vancomycin Itching     Tolerated vancomycin 7/2020    Bactrim  [sulfamethoxazole-trimethoprim] Rash     Billy MIREILLE Rivera had no medications administered during this visit.      Review of Systems   Constitutional:  Negative for appetite change, chills and fever.   HENT: Negative.     Respiratory:  Negative for cough, chest tightness and shortness of breath.    Cardiovascular:  Negative for chest pain, palpitations and leg swelling.   Gastrointestinal:  Negative for abdominal distention, abdominal pain, blood in stool, constipation, diarrhea, nausea and vomiting.   Endocrine: Negative.    Genitourinary:  Negative for difficulty urinating, dysuria, frequency and hematuria.   Musculoskeletal: Negative.    Integumentary:  Negative.   Neurological: Negative.    Psychiatric/Behavioral: Negative.           Objective:      Vitals:    04/03/24 0831   BP: 138/86   Pulse: 78   Resp: 18   Temp: 97.3 °F (36.3 °C)      Physical Exam  Vitals reviewed.   Constitutional:       General: He is not in acute distress.     Appearance: Normal appearance.   HENT:      Head: Normocephalic and atraumatic.      Comments: Facial features  are symmetric      Nose: Nose normal. No congestion or rhinorrhea.      Mouth/Throat:      Mouth: Mucous membranes are moist.      Pharynx: Oropharynx is clear. No oropharyngeal exudate or posterior oropharyngeal erythema.   Eyes:      General: No scleral icterus.     Extraocular Movements: Extraocular movements intact.      Conjunctiva/sclera: Conjunctivae normal.   Cardiovascular:      Rate and Rhythm: Normal rate and regular rhythm.      Pulses: Normal pulses.      Heart sounds: Normal heart sounds.   Pulmonary:      Effort: Pulmonary effort is normal. No respiratory distress.      Breath sounds: Normal breath sounds.   Musculoskeletal:         General: No deformity or signs of injury. Normal range of motion.      Cervical back: Normal range of motion.      Comments: Gait normal    Skin:     General: Skin is warm and dry.      Findings: No rash.   Neurological:      General: No focal deficit present.      Mental Status: He is alert and oriented to person, place, and time. Mental status is at baseline.   Psychiatric:         Mood and Affect: Mood normal.         Behavior: Behavior normal.         Thought Content: Thought content normal.       Current Outpatient Medications on File Prior to Visit   Medication Sig Dispense Refill    acetaminophen (TYLENOL) 500 MG tablet Take 1,000 mg by mouth daily as needed for Pain.      ascorbic acid, vitamin C, (VITAMIN C) 250 MG tablet Take 500 mg by mouth once daily.      blood-glucose sensor Haley Inject 1 each into the skin every 7 days. 12 each 3    multivit-min/folic/vit K/lycop (MEN'S MULTIVITAMIN ORAL) Take 1 tablet by mouth once daily.      subcutaneous insulin pump (MINIMED 780G INSULIN PUMP) Misc 1 each by Misc.(Non-Drug; Combo Route) route once daily. 1 each 0    [DISCONTINUED] aspirin (ECOTRIN) 81 MG EC tablet Take 1 tablet (81 mg total) by mouth once daily.  0    [DISCONTINUED] atorvastatin (LIPITOR) 80 MG tablet TAKE 1 TABLET BY MOUTH ONCE DAILY AT NIGHT 30 tablet  "6    [DISCONTINUED] blood sugar diagnostic (ACCU-CHEK GUIDE TEST STRIPS) Strp 1 each by Misc.(Non-Drug; Combo Route) route 5 (five) times daily. 150 each 11    [DISCONTINUED] carvediloL (COREG) 12.5 MG tablet Take 0.5 tablets (6.25 mg total) by mouth 2 (two) times daily with meals. 90 tablet 3    [DISCONTINUED] insulin aspart, niacinamide, (FIASP U-100 INSULIN) 100 unit/mL Soln Inject 100 Units into the skin continuous. Gives via insulin pump up to 100 units daily. Do not directly inject. Uses as directed. Rate is 1.75 units/hour - carb counts as well for meal time boluses. 60 mL 6    [DISCONTINUED] lancets (ACCU-CHEK FASTCLIX LANCET DRUM) Misc 1 each by Misc.(Non-Drug; Combo Route) route Daily. 30 each 11    [DISCONTINUED] lisinopriL (PRINIVIL,ZESTRIL) 40 MG tablet Take 1 tablet (40 mg total) by mouth once daily. 90 tablet 3    [DISCONTINUED] metFORMIN (GLUCOPHAGE) 1000 MG tablet Take 1 tablet (1,000 mg total) by mouth 2 (two) times daily with meals. 180 tablet 3    [DISCONTINUED] pen needle, diabetic 31 gauge x 3/16" Ndle Inject 1 each into the skin 4 (four) times daily.      bismuth subsalicylate (PEPTO BISMOL) 262 mg/15 mL suspension Take 15 mLs by mouth daily as needed (diarrhea).      insulin (LANTUS SOLOSTAR U-100 INSULIN) glargine 100 units/mL SubQ pen Inject 50 Units into the skin once daily. USE AS BACK UP INSULIN ONLY - EMERGENCY USE IF YOUR ARE OFF OF YOUR INSULIN PUMP 15 mL 1    insulin lispro (HUMALOG U-100 INSULIN) 100 unit/mL injection Inject 15 Units into the skin 3 (three) times daily before meals. Gives via insulin pump only. Do not Directly inject.      semaglutide (OZEMPIC) 0.25 mg or 0.5 mg (2 mg/3 mL) pen injector Inject 0.5 mg into the skin every 7 days. (Patient not taking: Reported on 3/14/2024) 3 mL 6    [DISCONTINUED] amLODIPine (NORVASC) 10 MG tablet Take 1 tablet (10 mg total) by mouth once daily. (Patient not taking: Reported on 4/3/2024) 90 tablet 3     No current " facility-administered medications on file prior to visit.         Assessment:       1. Establishing care with new doctor, encounter for    2. Other chronic osteomyelitis, right ankle and foot    3. Class 2 severe obesity with body mass index (BMI) of 35 to 39.9 with serious comorbidity    4. Diabetes mellitus with peripheral circulatory disorder    5. Acquired absence of other right toe(s)    6. Physical exam, annual    7. Type 2 diabetes mellitus with hyperglycemia, with long-term current use of insulin    8. Type 2 diabetes mellitus with diabetic peripheral angiopathy without gangrene, with long-term current use of insulin    9. Uncontrolled type 2 diabetes mellitus with hyperglycemia    10. Type 2 diabetes mellitus without complication, with long-term current use of insulin    11. Hypertension associated with diabetes        Plan:       Establishing care with new doctor, encounter for   - Primary care assumed   - All active medications refilled        Class 2 severe obesity with body mass index (BMI) of 35 to 39.9 with serious comorbidity   - GLP-1s have proven unaffordable    Diabetes mellitus with peripheral circulatory disorder  Acquired absence of other right toe(s)  Other chronic osteomyelitis, right ankle and foot   - history noted  -     Ambulatory referral/consult to Endocrinology; Future; Expected date: 04/10/2024  -     Ambulatory referral/consult to Optometry; Future; Expected date: 04/10/2024   - due to complexity of his diabetes and difficulty obtaining control (both due to medication intolerance and lack of medication affordability), will facilitate establishment with endocrinology for specialist assistance; recommendations and interventions appreciated    - podiatry follow-up is up-to-date; will facilitate diabetic eye exam     Physical exam, annual  -     CBC Auto Differential; Future; Expected date: 04/03/2024  -     Comprehensive Metabolic Panel; Future; Expected date: 04/03/2024  -      "Hemoglobin A1C; Future; Expected date: 04/03/2024  -     Lipid Panel; Future; Expected date: 04/03/2024  -     PSA, Screening; Future; Expected date: 04/03/2024  -     T4; Future; Expected date: 04/03/2024  -     TSH; Future; Expected date: 04/03/2024  -     Vitamin D; Future; Expected date: 04/03/2024  -     Microalbumin/Creatinine Ratio, Urine; Future; Expected date: 04/03/2024   - Annual screening labs ordered   - patient to update vaccinations via pharmacy     Type 2 diabetes mellitus with hyperglycemia, with long-term current use of insulin  -     metFORMIN (GLUCOPHAGE) 1000 MG tablet; Take 1 tablet (1,000 mg total) by mouth 2 (two) times daily with meals.  Dispense: 180 tablet; Refill: 3    Type 2 diabetes mellitus with diabetic peripheral angiopathy without gangrene, with long-term current use of insulin  -     lisinopriL (PRINIVIL,ZESTRIL) 40 MG tablet; Take 1 tablet (40 mg total) by mouth once daily.  Dispense: 90 tablet; Refill: 3  -     insulin aspart, niacinamide, (FIASP U-100 INSULIN) 100 unit/mL Soln; Inject 100 Units into the skin continuous. Gives via insulin pump up to 100 units daily. Do not directly inject. Uses as directed. Rate is 1.75 units/hour - carb counts as well for meal time boluses.  Dispense: 60 mL; Refill: 6  -     atorvastatin (LIPITOR) 80 MG tablet; Take 1 tablet (80 mg total) by mouth every evening.  Dispense: 30 tablet; Refill: 6    Uncontrolled type 2 diabetes mellitus with hyperglycemia  -     lancets (ACCU-CHEK FASTCLIX LANCET DRUM) Misc; 1 each by Misc.(Non-Drug; Combo Route) route Daily.  Dispense: 30 each; Refill: 11  -     blood sugar diagnostic (ACCU-CHEK GUIDE TEST STRIPS) Strp; 1 each by Misc.(Non-Drug; Combo Route) route 5 (five) times daily.  Dispense: 150 each; Refill: 11    Type 2 diabetes mellitus without complication, with long-term current use of insulin  -     pen needle, diabetic 31 gauge x 3/16" Ndle; Inject 1 each into the skin 4 (four) times daily.  Dispense: " 100 each; Refill: 11    Hypertension associated with diabetes  -     amLODIPine (NORVASC) 10 MG tablet; Take 1 tablet (10 mg total) by mouth once daily.  Dispense: 90 tablet; Refill: 3    Other orders  -     carvediloL (COREG) 12.5 MG tablet; Take 0.5 tablets (6.25 mg total) by mouth 2 (two) times daily with meals.  Dispense: 90 tablet; Refill: 3  -     aspirin (ECOTRIN) 81 MG EC tablet; Take 1 tablet (81 mg total) by mouth once daily.  Dispense: 90 tablet; Refill: 3

## 2024-04-03 ENCOUNTER — OFFICE VISIT (OUTPATIENT)
Dept: INTERNAL MEDICINE | Facility: CLINIC | Age: 59
End: 2024-04-03
Payer: COMMERCIAL

## 2024-04-03 VITALS
OXYGEN SATURATION: 97 % | SYSTOLIC BLOOD PRESSURE: 138 MMHG | RESPIRATION RATE: 18 BRPM | HEART RATE: 78 BPM | TEMPERATURE: 97 F | WEIGHT: 292.75 LBS | HEIGHT: 74 IN | BODY MASS INDEX: 37.57 KG/M2 | DIASTOLIC BLOOD PRESSURE: 86 MMHG

## 2024-04-03 DIAGNOSIS — I15.2 HYPERTENSION ASSOCIATED WITH DIABETES: Chronic | ICD-10-CM

## 2024-04-03 DIAGNOSIS — Z76.89 ESTABLISHING CARE WITH NEW DOCTOR, ENCOUNTER FOR: Primary | ICD-10-CM

## 2024-04-03 DIAGNOSIS — Z00.00 PHYSICAL EXAM, ANNUAL: ICD-10-CM

## 2024-04-03 DIAGNOSIS — Z79.4 TYPE 2 DIABETES MELLITUS WITHOUT COMPLICATION, WITH LONG-TERM CURRENT USE OF INSULIN: ICD-10-CM

## 2024-04-03 DIAGNOSIS — E11.51 DIABETES MELLITUS WITH PERIPHERAL CIRCULATORY DISORDER: ICD-10-CM

## 2024-04-03 DIAGNOSIS — E11.9 TYPE 2 DIABETES MELLITUS WITHOUT COMPLICATION, WITH LONG-TERM CURRENT USE OF INSULIN: ICD-10-CM

## 2024-04-03 DIAGNOSIS — E11.65 UNCONTROLLED TYPE 2 DIABETES MELLITUS WITH HYPERGLYCEMIA: ICD-10-CM

## 2024-04-03 DIAGNOSIS — Z79.4 TYPE 2 DIABETES MELLITUS WITH DIABETIC PERIPHERAL ANGIOPATHY WITHOUT GANGRENE, WITH LONG-TERM CURRENT USE OF INSULIN: Chronic | ICD-10-CM

## 2024-04-03 DIAGNOSIS — Z89.421 ACQUIRED ABSENCE OF OTHER RIGHT TOE(S): ICD-10-CM

## 2024-04-03 DIAGNOSIS — E66.01 CLASS 2 SEVERE OBESITY WITH BODY MASS INDEX (BMI) OF 35 TO 39.9 WITH SERIOUS COMORBIDITY: ICD-10-CM

## 2024-04-03 DIAGNOSIS — E11.59 HYPERTENSION ASSOCIATED WITH DIABETES: Chronic | ICD-10-CM

## 2024-04-03 DIAGNOSIS — E11.51 TYPE 2 DIABETES MELLITUS WITH DIABETIC PERIPHERAL ANGIOPATHY WITHOUT GANGRENE, WITH LONG-TERM CURRENT USE OF INSULIN: Chronic | ICD-10-CM

## 2024-04-03 DIAGNOSIS — M86.671 OTHER CHRONIC OSTEOMYELITIS, RIGHT ANKLE AND FOOT: ICD-10-CM

## 2024-04-03 DIAGNOSIS — E11.65 TYPE 2 DIABETES MELLITUS WITH HYPERGLYCEMIA, WITH LONG-TERM CURRENT USE OF INSULIN: ICD-10-CM

## 2024-04-03 DIAGNOSIS — Z79.4 TYPE 2 DIABETES MELLITUS WITH HYPERGLYCEMIA, WITH LONG-TERM CURRENT USE OF INSULIN: ICD-10-CM

## 2024-04-03 PROCEDURE — 3079F DIAST BP 80-89 MM HG: CPT | Mod: CPTII,S$GLB,, | Performed by: STUDENT IN AN ORGANIZED HEALTH CARE EDUCATION/TRAINING PROGRAM

## 2024-04-03 PROCEDURE — 99999 PR PBB SHADOW E&M-EST. PATIENT-LVL IV: CPT | Mod: PBBFAC,,, | Performed by: STUDENT IN AN ORGANIZED HEALTH CARE EDUCATION/TRAINING PROGRAM

## 2024-04-03 PROCEDURE — 3075F SYST BP GE 130 - 139MM HG: CPT | Mod: CPTII,S$GLB,, | Performed by: STUDENT IN AN ORGANIZED HEALTH CARE EDUCATION/TRAINING PROGRAM

## 2024-04-03 PROCEDURE — 1159F MED LIST DOCD IN RCRD: CPT | Mod: CPTII,S$GLB,, | Performed by: STUDENT IN AN ORGANIZED HEALTH CARE EDUCATION/TRAINING PROGRAM

## 2024-04-03 PROCEDURE — 4010F ACE/ARB THERAPY RXD/TAKEN: CPT | Mod: CPTII,S$GLB,, | Performed by: STUDENT IN AN ORGANIZED HEALTH CARE EDUCATION/TRAINING PROGRAM

## 2024-04-03 PROCEDURE — 99214 OFFICE O/P EST MOD 30 MIN: CPT | Mod: S$GLB,,, | Performed by: STUDENT IN AN ORGANIZED HEALTH CARE EDUCATION/TRAINING PROGRAM

## 2024-04-03 PROCEDURE — 3008F BODY MASS INDEX DOCD: CPT | Mod: CPTII,S$GLB,, | Performed by: STUDENT IN AN ORGANIZED HEALTH CARE EDUCATION/TRAINING PROGRAM

## 2024-04-03 RX ORDER — LANCETS
1 EACH MISCELLANEOUS DAILY
Qty: 30 EACH | Refills: 11 | Status: SHIPPED | OUTPATIENT
Start: 2024-04-03

## 2024-04-03 RX ORDER — ASPIRIN 81 MG/1
81 TABLET ORAL DAILY
Qty: 90 TABLET | Refills: 3 | COMMUNITY
Start: 2024-04-03

## 2024-04-03 RX ORDER — LISINOPRIL 40 MG/1
40 TABLET ORAL DAILY
Qty: 90 TABLET | Refills: 3 | Status: SHIPPED | OUTPATIENT
Start: 2024-04-03

## 2024-04-03 RX ORDER — CARVEDILOL 12.5 MG/1
6.25 TABLET ORAL 2 TIMES DAILY WITH MEALS
Qty: 90 TABLET | Refills: 3 | Status: SHIPPED | OUTPATIENT
Start: 2024-04-03 | End: 2025-04-03

## 2024-04-03 RX ORDER — PEN NEEDLE, DIABETIC 30 GX3/16"
1 NEEDLE, DISPOSABLE MISCELLANEOUS 4 TIMES DAILY
Qty: 100 EACH | Refills: 11 | Status: SHIPPED | OUTPATIENT
Start: 2024-04-03

## 2024-04-03 RX ORDER — METFORMIN HYDROCHLORIDE 1000 MG/1
1000 TABLET ORAL 2 TIMES DAILY WITH MEALS
Qty: 180 TABLET | Refills: 3 | Status: SHIPPED | OUTPATIENT
Start: 2024-04-03

## 2024-04-03 RX ORDER — INSULIN ASPART INJECTION 100 [IU]/ML
100 INJECTION, SOLUTION SUBCUTANEOUS CONTINUOUS
Qty: 60 ML | Refills: 6 | Status: SHIPPED | OUTPATIENT
Start: 2024-04-03

## 2024-04-03 RX ORDER — AMLODIPINE BESYLATE 10 MG/1
10 TABLET ORAL DAILY
Qty: 90 TABLET | Refills: 3 | Status: SHIPPED | OUTPATIENT
Start: 2024-04-03

## 2024-04-03 RX ORDER — ATORVASTATIN CALCIUM 80 MG/1
80 TABLET, FILM COATED ORAL NIGHTLY
Qty: 30 TABLET | Refills: 6 | Status: SHIPPED | OUTPATIENT
Start: 2024-04-03

## 2024-04-05 ENCOUNTER — PATIENT MESSAGE (OUTPATIENT)
Dept: ADMINISTRATIVE | Facility: HOSPITAL | Age: 59
End: 2024-04-05
Payer: COMMERCIAL

## 2024-04-15 ENCOUNTER — TELEPHONE (OUTPATIENT)
Dept: PODIATRY | Facility: CLINIC | Age: 59
End: 2024-04-15
Payer: COMMERCIAL

## 2024-04-15 ENCOUNTER — OFFICE VISIT (OUTPATIENT)
Dept: PODIATRY | Facility: CLINIC | Age: 59
End: 2024-04-15
Payer: COMMERCIAL

## 2024-04-15 VITALS
SYSTOLIC BLOOD PRESSURE: 152 MMHG | HEART RATE: 66 BPM | HEIGHT: 74 IN | DIASTOLIC BLOOD PRESSURE: 84 MMHG | WEIGHT: 291 LBS | RESPIRATION RATE: 18 BRPM | TEMPERATURE: 97 F | BODY MASS INDEX: 37.35 KG/M2

## 2024-04-15 DIAGNOSIS — E11.49 TYPE II DIABETES MELLITUS WITH NEUROLOGICAL MANIFESTATIONS: ICD-10-CM

## 2024-04-15 DIAGNOSIS — L97.523 ULCER OF LEFT FOOT WITH NECROSIS OF MUSCLE: Primary | ICD-10-CM

## 2024-04-15 DIAGNOSIS — L97.512 FOOT ULCER, RIGHT, WITH FAT LAYER EXPOSED: ICD-10-CM

## 2024-04-15 PROCEDURE — 87077 CULTURE AEROBIC IDENTIFY: CPT | Mod: 59 | Performed by: PODIATRIST

## 2024-04-15 PROCEDURE — 99999 PR PBB SHADOW E&M-EST. PATIENT-LVL V: CPT | Mod: PBBFAC,,, | Performed by: PODIATRIST

## 2024-04-15 PROCEDURE — 87075 CULTR BACTERIA EXCEPT BLOOD: CPT | Performed by: PODIATRIST

## 2024-04-15 PROCEDURE — 3079F DIAST BP 80-89 MM HG: CPT | Mod: CPTII,S$GLB,, | Performed by: PODIATRIST

## 2024-04-15 PROCEDURE — 1159F MED LIST DOCD IN RCRD: CPT | Mod: CPTII,S$GLB,, | Performed by: PODIATRIST

## 2024-04-15 PROCEDURE — 99213 OFFICE O/P EST LOW 20 MIN: CPT | Mod: 25,S$GLB,, | Performed by: PODIATRIST

## 2024-04-15 PROCEDURE — 11042 DBRDMT SUBQ TIS 1ST 20SQCM/<: CPT | Mod: S$GLB,,, | Performed by: PODIATRIST

## 2024-04-15 PROCEDURE — 1160F RVW MEDS BY RX/DR IN RCRD: CPT | Mod: CPTII,S$GLB,, | Performed by: PODIATRIST

## 2024-04-15 PROCEDURE — 87070 CULTURE OTHR SPECIMN AEROBIC: CPT | Performed by: PODIATRIST

## 2024-04-15 PROCEDURE — 87186 SC STD MICRODIL/AGAR DIL: CPT | Mod: 59 | Performed by: PODIATRIST

## 2024-04-15 PROCEDURE — 3008F BODY MASS INDEX DOCD: CPT | Mod: CPTII,S$GLB,, | Performed by: PODIATRIST

## 2024-04-15 PROCEDURE — 3077F SYST BP >= 140 MM HG: CPT | Mod: CPTII,S$GLB,, | Performed by: PODIATRIST

## 2024-04-15 PROCEDURE — 4010F ACE/ARB THERAPY RXD/TAKEN: CPT | Mod: CPTII,S$GLB,, | Performed by: PODIATRIST

## 2024-04-15 PROCEDURE — 87076 CULTURE ANAEROBE IDENT EACH: CPT | Mod: 59 | Performed by: PODIATRIST

## 2024-04-15 RX ORDER — DOXYCYCLINE 100 MG/1
100 CAPSULE ORAL 2 TIMES DAILY
Qty: 20 CAPSULE | Refills: 0 | Status: SHIPPED | OUTPATIENT
Start: 2024-04-15 | End: 2024-05-27

## 2024-04-15 NOTE — TELEPHONE ENCOUNTER
Spoke with Mr Miguel who is requesting a same day appointment due to a new blister that is draining and has a smell to it. Schedule patient for today at 1:00 pm with Dr Melara for an evaluation, patient verbalized understanding and will be here for 1:00pm

## 2024-04-15 NOTE — TELEPHONE ENCOUNTER
----- Message from Araceli Sidhu sent at 4/15/2024  8:16 AM CDT -----  Contact: [phone]  Billy Rivera calling regarding Same Day Appointment  (message) for #Asking for a call back to help get in today to be seen

## 2024-04-18 RX ORDER — CIPROFLOXACIN 500 MG/1
500 TABLET ORAL EVERY 12 HOURS
Qty: 20 TABLET | Refills: 0 | Status: SHIPPED | OUTPATIENT
Start: 2024-04-18 | End: 2024-05-27

## 2024-04-18 NOTE — PROGRESS NOTES
Subjective:      Patient ID: Billy Rivera is a 58 y.o. male.    Chief Complaint:  Follow-up wound care, right foot.      Billy is a 58 y.o. male who presents to the clinic for evaluation and treatment of high risk feet. Billy has a past medical history of Allergy, CAD (coronary artery disease), native coronary artery (6/25/2013), Cancer, COVID-19 virus detected (9/1/2020), Diabetes mellitus, Diabetes mellitus, type 2, Disorder of kidney and ureter, Heart attack (04/2012), colon cancer, stage I, Hyperlipidemia, Hypertension, Muscular pain, NSTEMI May 2013 - peak troponin 0.22 (5/22/2013), MICHAELA (obstructive sleep apnea), and Retinopathy due to secondary diabetes.  New issue of a blister to the left foot with odor.          PCP: Augie Ruiz MD    Date Last Seen by PCP:   Chief Complaint   Patient presents with    Wound Check     Left foot blister - drainage - odor        History of Trauma: negative  Sign of Infection: none        Hemoglobin A1C   Date Value Ref Range Status   07/20/2023 8.7 (H) 4.0 - 5.6 % Final     Comment:     ADA Screening Guidelines:  5.7-6.4%  Consistent with prediabetes  >or=6.5%  Consistent with diabetes    High levels of fetal hemoglobin interfere with the HbA1C  assay. Heterozygous hemoglobin variants (HbS, HgC, etc)do  not significantly interfere with this assay.   However, presence of multiple variants may affect accuracy.     04/06/2023 9.4 (H) 4.0 - 5.6 % Final     Comment:     ADA Screening Guidelines:  5.7-6.4%  Consistent with prediabetes  >or=6.5%  Consistent with diabetes    High levels of fetal hemoglobin interfere with the HbA1C  assay. Heterozygous hemoglobin variants (HbS, HgC, etc)do  not significantly interfere with this assay.   However, presence of multiple variants may affect accuracy.     11/10/2022 7.1 (H) 4.0 - 5.6 % Final     Comment:     ADA Screening Guidelines:  5.7-6.4%  Consistent with prediabetes  >or=6.5%  Consistent with diabetes    High  "levels of fetal hemoglobin interfere with the HbA1C  assay. Heterozygous hemoglobin variants (HbS, HgC, etc)do  not significantly interfere with this assay.   However, presence of multiple variants may affect accuracy.                 Objective:      Vitals:    04/15/24 1302   BP: (!) 152/84   Pulse: 66   Resp: 18   Temp: 97.1 °F (36.2 °C)   TempSrc: Oral   Weight: 132 kg (291 lb 0.1 oz)   Height: 6' 2" (1.88 m)   PainSc: 0-No pain        Physical Exam  Constitutional:       General: He is not in acute distress.     Appearance: Normal appearance. He is well-developed and normal weight.   HENT:      Head: Normocephalic and atraumatic.   Cardiovascular:      Pulses:           Dorsalis pedis pulses are 2+ on the right side and 2+ on the left side.        Posterior tibial pulses are 1+ on the right side and 1+ on the left side.      Comments: CFT< 3 secs all toes bilateral foot, skin temp warm to cool bilateral foot, no hair growth bilateral lower extremity, no edema to bilateral lower extremities    Pulmonary:      Effort: Pulmonary effort is normal.   Musculoskeletal:         General: No swelling.      Right foot: Normal range of motion. Deformity present. No foot drop.      Left foot: Normal range of motion. Deformity present. No foot drop.      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     5/5 muscle strength with DF/PF/Inv/Ev bilateral.       Feet:      Right foot:      Protective Sensation: 5 sites tested.  0 sites sensed.      Skin integrity: Ulcer, skin breakdown, callus and dry skin present. No erythema.      Toenail Condition: Right toenails are abnormally thick.      Left foot:      Protective Sensation: 5 sites tested.  0 sites sensed.      Skin integrity: Callus and dry skin present. No ulcer, skin breakdown or erythema.      Toenail Condition: Left toenails are abnormally thick.   Skin:     General: Skin is warm and dry.      Capillary Refill: Capillary refill " takes 2 to 3 seconds.      Findings: No erythema.      Nails: There is no clubbing.      Comments: Ulceration location:  Left plantar 1st MPJ blister noted/upon drainage full-thickness ulceration noted measuring 2 cm x 1 cm x 0.2 cm.  Purulent drainage noted.  The area was cultured.  These are sub cutaneous measurements as well as post debridement.        Neurological:      Mental Status: He is alert and oriented to person, place, and time.      Sensory: Sensory deficit present.      Motor: No weakness.      Deep Tendon Reflexes:      Reflex Scores:       Patellar reflexes are 2+ on the right side and 2+ on the left side.       Achilles reflexes are 2+ on the right side and 2+ on the left side.     Comments: Diminished/loss of protective sensation all toes bilateral to 10 gram monofilament.   Psychiatric:         Mood and Affect: Mood normal.         Behavior: Behavior normal.         Thought Content: Thought content normal.         Ulcerations appear healed bilateral.    Assessment:       Encounter Diagnoses   Name Primary?    Ulcer of left foot with necrosis of muscle Yes    Type II diabetes mellitus with neurological manifestations     Foot ulcer, right, with fat layer exposed          Plan:       Billy was seen today for wound check.    Diagnoses and all orders for this visit:    Ulcer of left foot with necrosis of muscle  -     Aerobic culture (Specify Source)  -     Culture, Anaerobic    Type II diabetes mellitus with neurological manifestations    Foot ulcer, right, with fat layer exposed    Other orders  -     doxycycline (VIBRAMYCIN) 100 MG Cap; Take 1 capsule (100 mg total) by mouth 2 (two) times daily.        Independent review of patients previous clinical notes    Reviewed current medication(s), and lab(s) specific to foot ailment(s) with patient in detail. All questions answered     The nature of the condition, options for management, as well as potential risks and complications were discussed in  "detail with patient. Patient was amenable to my recommendations and left my office fully informed and will follow up as instructed or sooner if necessary.      Wound Debridement    Performed by: Mingo Melara DPM   Authorized by: Patient    Time out: Immediately prior to procedure a "time out" was called to verify the correct patient, procedure, equipment, support staff and site/side marked as required.   Consent Done?:  Yes (Verbal)  Local anesthesia used?: No       Wound Details:    Location:  Left foot     Type of Debridement:  Excisional       Length (cm):  2.0       Width (cm):  1.0       Depth (cm):  0.2       Percent Debrided (%):  100             Depth of debridement:  Subcutaneous tissue    Tissue debrided:  Dermis, Epidermis and Subcutaneous    Devitalized tissue debrided:  Biofilm, Callus and Necrotic/Eschar    Instruments:  Blade, Curette and Nippers     Bleeding:  Minimal  Hemostasis Achieved: Yes    Method Used:  Pressure  Patient tolerance:  Patient tolerated the procedure well with no immediate complications      Wound care discussed in detail.  Follow-up in 4 weeks.        "

## 2024-04-19 ENCOUNTER — PATIENT MESSAGE (OUTPATIENT)
Dept: ADMINISTRATIVE | Facility: HOSPITAL | Age: 59
End: 2024-04-19
Payer: COMMERCIAL

## 2024-04-19 LAB
BACTERIA SPEC AEROBE CULT: ABNORMAL
BACTERIA SPEC AEROBE CULT: ABNORMAL

## 2024-04-22 LAB
BACTERIA SPEC ANAEROBE CULT: ABNORMAL

## 2024-04-24 ENCOUNTER — OFFICE VISIT (OUTPATIENT)
Dept: PODIATRY | Facility: CLINIC | Age: 59
End: 2024-04-24
Payer: COMMERCIAL

## 2024-04-24 VITALS
HEART RATE: 69 BPM | HEIGHT: 74 IN | SYSTOLIC BLOOD PRESSURE: 151 MMHG | WEIGHT: 291 LBS | DIASTOLIC BLOOD PRESSURE: 81 MMHG | BODY MASS INDEX: 37.35 KG/M2

## 2024-04-24 DIAGNOSIS — L97.523 ULCER OF LEFT FOOT WITH NECROSIS OF MUSCLE: Primary | ICD-10-CM

## 2024-04-24 PROCEDURE — 3079F DIAST BP 80-89 MM HG: CPT | Mod: CPTII,S$GLB,, | Performed by: PODIATRIST

## 2024-04-24 PROCEDURE — 1159F MED LIST DOCD IN RCRD: CPT | Mod: CPTII,S$GLB,, | Performed by: PODIATRIST

## 2024-04-24 PROCEDURE — 3008F BODY MASS INDEX DOCD: CPT | Mod: CPTII,S$GLB,, | Performed by: PODIATRIST

## 2024-04-24 PROCEDURE — 99999 PR PBB SHADOW E&M-EST. PATIENT-LVL IV: CPT | Mod: PBBFAC,,, | Performed by: PODIATRIST

## 2024-04-24 PROCEDURE — 11042 DBRDMT SUBQ TIS 1ST 20SQCM/<: CPT | Mod: S$GLB,,, | Performed by: PODIATRIST

## 2024-04-24 PROCEDURE — 3077F SYST BP >= 140 MM HG: CPT | Mod: CPTII,S$GLB,, | Performed by: PODIATRIST

## 2024-04-24 PROCEDURE — 99213 OFFICE O/P EST LOW 20 MIN: CPT | Mod: 25,S$GLB,, | Performed by: PODIATRIST

## 2024-04-24 PROCEDURE — 4010F ACE/ARB THERAPY RXD/TAKEN: CPT | Mod: CPTII,S$GLB,, | Performed by: PODIATRIST

## 2024-04-28 NOTE — PROGRESS NOTES
Subjective:      Patient ID: Billy Rivera is a 58 y.o. male.    Chief Complaint:  Follow-up wound care, right foot.      Billy is a 58 y.o. male who presents to the clinic for evaluation and treatment of high risk feet. Billy has a past medical history of Allergy, CAD (coronary artery disease), native coronary artery (6/25/2013), Cancer, COVID-19 virus detected (9/1/2020), Diabetes mellitus, Diabetes mellitus, type 2, Disorder of kidney and ureter, Heart attack (04/2012), colon cancer, stage I, Hyperlipidemia, Hypertension, Muscular pain, NSTEMI May 2013 - peak troponin 0.22 (5/22/2013), MICHAELA (obstructive sleep apnea), and Retinopathy due to secondary diabetes.  New issue of a blister to the left foot with odor.          PCP: Augie Ruiz MD    Date Last Seen by PCP:   Chief Complaint   Patient presents with    Wound Care     Left foot wound care       History of Trauma: negative  Sign of Infection: none        Hemoglobin A1C   Date Value Ref Range Status   07/20/2023 8.7 (H) 4.0 - 5.6 % Final     Comment:     ADA Screening Guidelines:  5.7-6.4%  Consistent with prediabetes  >or=6.5%  Consistent with diabetes    High levels of fetal hemoglobin interfere with the HbA1C  assay. Heterozygous hemoglobin variants (HbS, HgC, etc)do  not significantly interfere with this assay.   However, presence of multiple variants may affect accuracy.     04/06/2023 9.4 (H) 4.0 - 5.6 % Final     Comment:     ADA Screening Guidelines:  5.7-6.4%  Consistent with prediabetes  >or=6.5%  Consistent with diabetes    High levels of fetal hemoglobin interfere with the HbA1C  assay. Heterozygous hemoglobin variants (HbS, HgC, etc)do  not significantly interfere with this assay.   However, presence of multiple variants may affect accuracy.     11/10/2022 7.1 (H) 4.0 - 5.6 % Final     Comment:     ADA Screening Guidelines:  5.7-6.4%  Consistent with prediabetes  >or=6.5%  Consistent with diabetes    High levels of fetal  "hemoglobin interfere with the HbA1C  assay. Heterozygous hemoglobin variants (HbS, HgC, etc)do  not significantly interfere with this assay.   However, presence of multiple variants may affect accuracy.                 Objective:      Vitals:    04/24/24 0904   BP: (!) 151/81   Pulse: 69   Weight: 132 kg (291 lb 0.1 oz)   Height: 6' 2" (1.88 m)   PainSc: 0-No pain        Physical Exam  Constitutional:       General: He is not in acute distress.     Appearance: Normal appearance. He is well-developed and normal weight.   HENT:      Head: Normocephalic and atraumatic.   Cardiovascular:      Pulses:           Dorsalis pedis pulses are 2+ on the right side and 2+ on the left side.        Posterior tibial pulses are 1+ on the right side and 1+ on the left side.      Comments: CFT< 3 secs all toes bilateral foot, skin temp warm to cool bilateral foot, no hair growth bilateral lower extremity, no edema to bilateral lower extremities    Pulmonary:      Effort: Pulmonary effort is normal.   Musculoskeletal:         General: No swelling.      Right foot: Normal range of motion. Deformity present. No foot drop.      Left foot: Normal range of motion. Deformity present. No foot drop.      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     5/5 muscle strength with DF/PF/Inv/Ev bilateral.       Feet:      Right foot:      Protective Sensation: 5 sites tested.  0 sites sensed.      Skin integrity: Ulcer, skin breakdown, callus and dry skin present. No erythema.      Toenail Condition: Right toenails are abnormally thick.      Left foot:      Protective Sensation: 5 sites tested.  0 sites sensed.      Skin integrity: Callus and dry skin present. No ulcer, skin breakdown or erythema.      Toenail Condition: Left toenails are abnormally thick.   Skin:     General: Skin is warm and dry.      Capillary Refill: Capillary refill takes 2 to 3 seconds.      Findings: No erythema.      Nails: There " "is no clubbing.      Comments: Ulceration location:  Left plantar 1st MPJ blister noted/upon drainage full-thickness ulceration noted measuring 1.5 cm x 1 cm x 0.2 cm.  Purulent drainage noted.  The area was cultured.  These are sub cutaneous measurements as well as post debridement.        Neurological:      Mental Status: He is alert and oriented to person, place, and time.      Sensory: Sensory deficit present.      Motor: No weakness.      Deep Tendon Reflexes:      Reflex Scores:       Patellar reflexes are 2+ on the right side and 2+ on the left side.       Achilles reflexes are 2+ on the right side and 2+ on the left side.     Comments: Diminished/loss of protective sensation all toes bilateral to 10 gram monofilament.   Psychiatric:         Mood and Affect: Mood normal.         Behavior: Behavior normal.         Thought Content: Thought content normal.         Ulcerations appear healed bilateral.    Assessment:       Encounter Diagnosis   Name Primary?    Ulcer of left foot with necrosis of muscle Yes         Plan:       Billy was seen today for wound care.    Diagnoses and all orders for this visit:    Ulcer of left foot with necrosis of muscle        Independent review of patients previous clinical notes    Reviewed current medication(s), and lab(s) specific to foot ailment(s) with patient in detail. All questions answered     The nature of the condition, options for management, as well as potential risks and complications were discussed in detail with patient. Patient was amenable to my recommendations and left my office fully informed and will follow up as instructed or sooner if necessary.      Wound Debridement    Performed by: Mingo Melara DPM   Authorized by: Patient    Time out: Immediately prior to procedure a "time out" was called to verify the correct patient, procedure, equipment, support staff and site/side marked as required.   Consent Done?:  Yes (Verbal)  Local anesthesia used?: No  "      Wound Details:    Location:  Left foot     Type of Debridement:  Excisional       Length (cm): 1.5       Width (cm):  1.0       Depth (cm):  0.2       Percent Debrided (%):  100             Depth of debridement:  Subcutaneous tissue    Tissue debrided:  Dermis, Epidermis and Subcutaneous    Devitalized tissue debrided:  Biofilm, Callus and Necrotic/Eschar    Instruments:  Blade, Curette and Nippers     Bleeding:  Minimal  Hemostasis Achieved: Yes    Method Used:  Pressure  Patient tolerance:  Patient tolerated the procedure well with no immediate complications      Wound care discussed in detail.  Follow-up in 4 weeks.

## 2024-05-01 ENCOUNTER — OFFICE VISIT (OUTPATIENT)
Dept: PODIATRY | Facility: CLINIC | Age: 59
End: 2024-05-01
Payer: COMMERCIAL

## 2024-05-01 VITALS
DIASTOLIC BLOOD PRESSURE: 80 MMHG | BODY MASS INDEX: 37.36 KG/M2 | HEART RATE: 69 BPM | SYSTOLIC BLOOD PRESSURE: 150 MMHG | HEIGHT: 74 IN

## 2024-05-01 DIAGNOSIS — L97.523 ULCER OF LEFT FOOT WITH NECROSIS OF MUSCLE: Primary | ICD-10-CM

## 2024-05-01 DIAGNOSIS — E11.49 TYPE II DIABETES MELLITUS WITH NEUROLOGICAL MANIFESTATIONS: ICD-10-CM

## 2024-05-01 PROCEDURE — 99213 OFFICE O/P EST LOW 20 MIN: CPT | Mod: 25,S$GLB,, | Performed by: PODIATRIST

## 2024-05-01 PROCEDURE — 3077F SYST BP >= 140 MM HG: CPT | Mod: CPTII,S$GLB,, | Performed by: PODIATRIST

## 2024-05-01 PROCEDURE — 3079F DIAST BP 80-89 MM HG: CPT | Mod: CPTII,S$GLB,, | Performed by: PODIATRIST

## 2024-05-01 PROCEDURE — 1159F MED LIST DOCD IN RCRD: CPT | Mod: CPTII,S$GLB,, | Performed by: PODIATRIST

## 2024-05-01 PROCEDURE — 99999 PR PBB SHADOW E&M-EST. PATIENT-LVL IV: CPT | Mod: PBBFAC,,, | Performed by: PODIATRIST

## 2024-05-01 PROCEDURE — 3008F BODY MASS INDEX DOCD: CPT | Mod: CPTII,S$GLB,, | Performed by: PODIATRIST

## 2024-05-01 PROCEDURE — 4010F ACE/ARB THERAPY RXD/TAKEN: CPT | Mod: CPTII,S$GLB,, | Performed by: PODIATRIST

## 2024-05-01 PROCEDURE — 11042 DBRDMT SUBQ TIS 1ST 20SQCM/<: CPT | Mod: LT,S$GLB,, | Performed by: PODIATRIST

## 2024-05-01 NOTE — PROGRESS NOTES
Subjective:      Patient ID: Billy Rivera is a 58 y.o. male.    Chief Complaint:  Follow-up wound care, right foot.      Billy is a 58 y.o. male who presents to the clinic for evaluation and treatment of high risk feet. Billy has a past medical history of Allergy, CAD (coronary artery disease), native coronary artery (6/25/2013), Cancer, COVID-19 virus detected (9/1/2020), Diabetes mellitus, Diabetes mellitus, type 2, Disorder of kidney and ureter, Heart attack (04/2012), colon cancer, stage I, Hyperlipidemia, Hypertension, Muscular pain, NSTEMI May 2013 - peak troponin 0.22 (5/22/2013), MICHAELA (obstructive sleep apnea), and Retinopathy due to secondary diabetes.  Follow-up new blister/ulceration, doing very well, improving.  No new complaints today.          PCP: Augie Ruiz MD    Date Last Seen by PCP:   Chief Complaint   Patient presents with    Wound Care     Left foot blister        History of Trauma: negative  Sign of Infection: none        Hemoglobin A1C   Date Value Ref Range Status   07/20/2023 8.7 (H) 4.0 - 5.6 % Final     Comment:     ADA Screening Guidelines:  5.7-6.4%  Consistent with prediabetes  >or=6.5%  Consistent with diabetes    High levels of fetal hemoglobin interfere with the HbA1C  assay. Heterozygous hemoglobin variants (HbS, HgC, etc)do  not significantly interfere with this assay.   However, presence of multiple variants may affect accuracy.     04/06/2023 9.4 (H) 4.0 - 5.6 % Final     Comment:     ADA Screening Guidelines:  5.7-6.4%  Consistent with prediabetes  >or=6.5%  Consistent with diabetes    High levels of fetal hemoglobin interfere with the HbA1C  assay. Heterozygous hemoglobin variants (HbS, HgC, etc)do  not significantly interfere with this assay.   However, presence of multiple variants may affect accuracy.     11/10/2022 7.1 (H) 4.0 - 5.6 % Final     Comment:     ADA Screening Guidelines:  5.7-6.4%  Consistent with prediabetes  >or=6.5%  Consistent with  "diabetes    High levels of fetal hemoglobin interfere with the HbA1C  assay. Heterozygous hemoglobin variants (HbS, HgC, etc)do  not significantly interfere with this assay.   However, presence of multiple variants may affect accuracy.                 Objective:      Vitals:    05/01/24 1346   BP: (!) 150/80   Pulse: 69   Height: 6' 2" (1.88 m)   PainSc: 0-No pain        Physical Exam  Constitutional:       General: He is not in acute distress.     Appearance: Normal appearance. He is well-developed and normal weight.   HENT:      Head: Normocephalic and atraumatic.   Cardiovascular:      Pulses:           Dorsalis pedis pulses are 2+ on the right side and 2+ on the left side.        Posterior tibial pulses are 1+ on the right side and 1+ on the left side.      Comments: CFT< 3 secs all toes bilateral foot, skin temp warm to cool bilateral foot, no hair growth bilateral lower extremity, no edema to bilateral lower extremities    Pulmonary:      Effort: Pulmonary effort is normal.   Musculoskeletal:         General: No swelling.      Right foot: Normal range of motion. Deformity present. No foot drop.      Left foot: Normal range of motion. Deformity present. No foot drop.      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     5/5 muscle strength with DF/PF/Inv/Ev bilateral.       Feet:      Right foot:      Protective Sensation: 5 sites tested.  0 sites sensed.      Skin integrity: Ulcer, skin breakdown, callus and dry skin present. No erythema.      Toenail Condition: Right toenails are abnormally thick.      Left foot:      Protective Sensation: 5 sites tested.  0 sites sensed.      Skin integrity: Callus and dry skin present. No ulcer, skin breakdown or erythema.      Toenail Condition: Left toenails are abnormally thick.   Skin:     General: Skin is warm and dry.      Capillary Refill: Capillary refill takes 2 to 3 seconds.      Findings: No erythema.      Nails: There " "is no clubbing.      Comments: Ulceration location:  Left plantar 1st MPJ blister noted/upon drainage full-thickness ulceration noted measuring 1.0 cm x 1 cm x 0.2 cm.  Purulent drainage noted.  The area was cultured.  These are sub cutaneous measurements as well as post debridement.        Neurological:      Mental Status: He is alert and oriented to person, place, and time.      Sensory: Sensory deficit present.      Motor: No weakness.      Deep Tendon Reflexes:      Reflex Scores:       Patellar reflexes are 2+ on the right side and 2+ on the left side.       Achilles reflexes are 2+ on the right side and 2+ on the left side.     Comments: Diminished/loss of protective sensation all toes bilateral to 10 gram monofilament.   Psychiatric:         Mood and Affect: Mood normal.         Behavior: Behavior normal.         Thought Content: Thought content normal.         Ulcerations appear healed bilateral.    Assessment:       Encounter Diagnoses   Name Primary?    Ulcer of left foot with necrosis of muscle Yes    Type II diabetes mellitus with neurological manifestations          Plan:       Billy was seen today for wound care.    Diagnoses and all orders for this visit:    Ulcer of left foot with necrosis of muscle    Type II diabetes mellitus with neurological manifestations        Independent review of patients previous clinical notes    Reviewed current medication(s), and lab(s) specific to foot ailment(s) with patient in detail. All questions answered     The nature of the condition, options for management, as well as potential risks and complications were discussed in detail with patient. Patient was amenable to my recommendations and left my office fully informed and will follow up as instructed or sooner if necessary.      Wound Debridement    Performed by: Mingo Melara DPM   Authorized by: Patient    Time out: Immediately prior to procedure a "time out" was called to verify the correct patient, " procedure, equipment, support staff and site/side marked as required.   Consent Done?:  Yes (Verbal)  Local anesthesia used?: No       Wound Details:    Location:  Left foot     Type of Debridement:  Excisional       Length (cm): 1.0       Width (cm):  1.0       Depth (cm):  0.2       Percent Debrided (%):  100             Depth of debridement:  Subcutaneous tissue    Tissue debrided:  Dermis, Epidermis and Subcutaneous    Devitalized tissue debrided:  Biofilm, Callus and Necrotic/Eschar    Instruments:  Blade, Curette and Nippers     Bleeding:  Minimal  Hemostasis Achieved: Yes    Method Used:  Pressure  Patient tolerance:  Patient tolerated the procedure well with no immediate complications      Wound care discussed in detail.  Follow-up in 1 week.

## 2024-05-09 ENCOUNTER — OFFICE VISIT (OUTPATIENT)
Dept: PODIATRY | Facility: CLINIC | Age: 59
End: 2024-05-09
Payer: COMMERCIAL

## 2024-05-09 VITALS
HEIGHT: 74 IN | WEIGHT: 291 LBS | BODY MASS INDEX: 37.35 KG/M2 | SYSTOLIC BLOOD PRESSURE: 183 MMHG | DIASTOLIC BLOOD PRESSURE: 84 MMHG | HEART RATE: 84 BPM

## 2024-05-09 DIAGNOSIS — L97.523 ULCER OF LEFT FOOT WITH NECROSIS OF MUSCLE: Primary | ICD-10-CM

## 2024-05-09 DIAGNOSIS — E11.49 TYPE II DIABETES MELLITUS WITH NEUROLOGICAL MANIFESTATIONS: ICD-10-CM

## 2024-05-09 PROCEDURE — 99999 PR PBB SHADOW E&M-EST. PATIENT-LVL IV: CPT | Mod: PBBFAC,,, | Performed by: PODIATRIST

## 2024-05-09 PROCEDURE — 3077F SYST BP >= 140 MM HG: CPT | Mod: CPTII,S$GLB,, | Performed by: PODIATRIST

## 2024-05-09 PROCEDURE — 4010F ACE/ARB THERAPY RXD/TAKEN: CPT | Mod: CPTII,S$GLB,, | Performed by: PODIATRIST

## 2024-05-09 PROCEDURE — 99213 OFFICE O/P EST LOW 20 MIN: CPT | Mod: 25,S$GLB,, | Performed by: PODIATRIST

## 2024-05-09 PROCEDURE — 1159F MED LIST DOCD IN RCRD: CPT | Mod: CPTII,S$GLB,, | Performed by: PODIATRIST

## 2024-05-09 PROCEDURE — 3079F DIAST BP 80-89 MM HG: CPT | Mod: CPTII,S$GLB,, | Performed by: PODIATRIST

## 2024-05-09 PROCEDURE — 3008F BODY MASS INDEX DOCD: CPT | Mod: CPTII,S$GLB,, | Performed by: PODIATRIST

## 2024-05-09 PROCEDURE — 11042 DBRDMT SUBQ TIS 1ST 20SQCM/<: CPT | Mod: LT,S$GLB,, | Performed by: PODIATRIST

## 2024-05-13 NOTE — ASSESSMENT & PLAN NOTE
- continue coreg 12.5 mg BID  - continue lisinopril 40 mg daily   Attempted to contact patient for CC f/u call; left HIPPA compliant message and ACM contact information.

## 2024-05-18 NOTE — PROGRESS NOTES
Subjective:      Patient ID: Billy Rivera is a 58 y.o. male.    Chief Complaint:  Follow-up wound care, right foot.      Billy is a 58 y.o. male who presents to the clinic for evaluation and treatment of high risk feet. Billy has a past medical history of Allergy, CAD (coronary artery disease), native coronary artery (6/25/2013), Cancer, COVID-19 virus detected (9/1/2020), Diabetes mellitus, Diabetes mellitus, type 2, Disorder of kidney and ureter, Heart attack (04/2012), colon cancer, stage I, Hyperlipidemia, Hypertension, Muscular pain, NSTEMI May 2013 - peak troponin 0.22 (5/22/2013), MICHAELA (obstructive sleep apnea), and Retinopathy due to secondary diabetes.  Follow-up new blister/ulceration, doing very well, improving.           PCP: Augie Ruiz MD    Date Last Seen by PCP:   Chief Complaint   Patient presents with    Wound Care     Left foot        History of Trauma: negative  Sign of Infection: none        Hemoglobin A1C   Date Value Ref Range Status   07/20/2023 8.7 (H) 4.0 - 5.6 % Final     Comment:     ADA Screening Guidelines:  5.7-6.4%  Consistent with prediabetes  >or=6.5%  Consistent with diabetes    High levels of fetal hemoglobin interfere with the HbA1C  assay. Heterozygous hemoglobin variants (HbS, HgC, etc)do  not significantly interfere with this assay.   However, presence of multiple variants may affect accuracy.     04/06/2023 9.4 (H) 4.0 - 5.6 % Final     Comment:     ADA Screening Guidelines:  5.7-6.4%  Consistent with prediabetes  >or=6.5%  Consistent with diabetes    High levels of fetal hemoglobin interfere with the HbA1C  assay. Heterozygous hemoglobin variants (HbS, HgC, etc)do  not significantly interfere with this assay.   However, presence of multiple variants may affect accuracy.     11/10/2022 7.1 (H) 4.0 - 5.6 % Final     Comment:     ADA Screening Guidelines:  5.7-6.4%  Consistent with prediabetes  >or=6.5%  Consistent with diabetes    High levels of fetal  "hemoglobin interfere with the HbA1C  assay. Heterozygous hemoglobin variants (HbS, HgC, etc)do  not significantly interfere with this assay.   However, presence of multiple variants may affect accuracy.                 Objective:      Vitals:    05/09/24 0802   BP: (!) 183/84   Pulse: 84   Weight: 132 kg (291 lb 0.1 oz)   Height: 6' 2" (1.88 m)   PainSc: 0-No pain        Physical Exam  Constitutional:       General: He is not in acute distress.     Appearance: Normal appearance. He is well-developed and normal weight.   HENT:      Head: Normocephalic and atraumatic.   Cardiovascular:      Pulses:           Dorsalis pedis pulses are 2+ on the right side and 2+ on the left side.        Posterior tibial pulses are 1+ on the right side and 1+ on the left side.      Comments: CFT< 3 secs all toes bilateral foot, skin temp warm to cool bilateral foot, no hair growth bilateral lower extremity, no edema to bilateral lower extremities    Pulmonary:      Effort: Pulmonary effort is normal.   Musculoskeletal:         General: No swelling.      Right foot: Normal range of motion. Deformity present. No foot drop.      Left foot: Normal range of motion. Deformity present. No foot drop.      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     5/5 muscle strength with DF/PF/Inv/Ev bilateral.       Feet:      Right foot:      Protective Sensation: 5 sites tested.  0 sites sensed.      Skin integrity: Ulcer, skin breakdown, callus and dry skin present. No erythema.      Toenail Condition: Right toenails are abnormally thick.      Left foot:      Protective Sensation: 5 sites tested.  0 sites sensed.      Skin integrity: Callus and dry skin present. No ulcer, skin breakdown or erythema.      Toenail Condition: Left toenails are abnormally thick.   Skin:     General: Skin is warm and dry.      Capillary Refill: Capillary refill takes 2 to 3 seconds.      Findings: No erythema.      Nails: There " "is no clubbing.      Comments: Ulceration location:  Left plantar 1st MPJ blister noted/upon drainage full-thickness ulceration noted measuring 0.7 cm x 0.5 cm x 0.2 cm.  Purulent drainage noted.  The area was cultured.  These are sub cutaneous measurements as well as post debridement.        Neurological:      Mental Status: He is alert and oriented to person, place, and time.      Sensory: Sensory deficit present.      Motor: No weakness.      Deep Tendon Reflexes:      Reflex Scores:       Patellar reflexes are 2+ on the right side and 2+ on the left side.       Achilles reflexes are 2+ on the right side and 2+ on the left side.     Comments: Diminished/loss of protective sensation all toes bilateral to 10 gram monofilament.   Psychiatric:         Mood and Affect: Mood normal.         Behavior: Behavior normal.         Thought Content: Thought content normal.         Ulcerations appear healed bilateral.    Assessment:       Encounter Diagnoses   Name Primary?    Ulcer of left foot with necrosis of muscle Yes    Type II diabetes mellitus with neurological manifestations          Plan:       Billy was seen today for wound care.    Diagnoses and all orders for this visit:    Ulcer of left foot with necrosis of muscle    Type II diabetes mellitus with neurological manifestations        Independent review of patients previous clinical notes    Reviewed current medication(s), and lab(s) specific to foot ailment(s) with patient in detail. All questions answered     The nature of the condition, options for management, as well as potential risks and complications were discussed in detail with patient. Patient was amenable to my recommendations and left my office fully informed and will follow up as instructed or sooner if necessary.      Wound Debridement    Performed by: Mingo Melara DPM   Authorized by: Patient    Time out: Immediately prior to procedure a "time out" was called to verify the correct patient, " procedure, equipment, support staff and site/side marked as required.   Consent Done?:  Yes (Verbal)  Local anesthesia used?: No       Wound Details:    Location:  Left foot     Type of Debridement:  Excisional       Length (cm): 0.7       Width (cm):  0.5       Depth (cm):  0.2       Percent Debrided (%):  100             Depth of debridement:  Subcutaneous tissue    Tissue debrided:  Dermis, Epidermis and Subcutaneous    Devitalized tissue debrided:  Biofilm, Callus and Necrotic/Eschar    Instruments:  Blade, Curette and Nippers     Bleeding:  Minimal  Hemostasis Achieved: Yes    Method Used:  Pressure  Patient tolerance:  Patient tolerated the procedure well with no immediate complications      Wound care discussed in detail.  Follow-up in 1 week.

## 2024-05-20 ENCOUNTER — HOSPITAL ENCOUNTER (OUTPATIENT)
Dept: RADIOLOGY | Facility: HOSPITAL | Age: 59
Discharge: HOME | End: 2024-05-20
Attending: PODIATRIST
Payer: COMMERCIAL

## 2024-05-20 ENCOUNTER — TELEPHONE (OUTPATIENT)
Dept: PODIATRY | Facility: CLINIC | Age: 59
End: 2024-05-20

## 2024-05-20 ENCOUNTER — OFFICE VISIT (OUTPATIENT)
Dept: PODIATRY | Facility: CLINIC | Age: 59
End: 2024-05-20
Payer: COMMERCIAL

## 2024-05-20 VITALS
DIASTOLIC BLOOD PRESSURE: 90 MMHG | SYSTOLIC BLOOD PRESSURE: 166 MMHG | HEART RATE: 62 BPM | HEIGHT: 74 IN | WEIGHT: 291 LBS | BODY MASS INDEX: 37.35 KG/M2

## 2024-05-20 DIAGNOSIS — L97.523 ULCER OF LEFT FOOT WITH NECROSIS OF MUSCLE: ICD-10-CM

## 2024-05-20 DIAGNOSIS — L97.512 FOOT ULCER, RIGHT, WITH FAT LAYER EXPOSED: ICD-10-CM

## 2024-05-20 DIAGNOSIS — L97.523 ULCER OF LEFT FOOT WITH NECROSIS OF MUSCLE: Primary | ICD-10-CM

## 2024-05-20 PROCEDURE — 73630 X-RAY EXAM OF FOOT: CPT | Mod: 26,,, | Performed by: RADIOLOGY

## 2024-05-20 PROCEDURE — 3077F SYST BP >= 140 MM HG: CPT | Mod: CPTII,S$GLB,, | Performed by: PODIATRIST

## 2024-05-20 PROCEDURE — 11042 DBRDMT SUBQ TIS 1ST 20SQCM/<: CPT | Mod: S$GLB,,, | Performed by: PODIATRIST

## 2024-05-20 PROCEDURE — 99213 OFFICE O/P EST LOW 20 MIN: CPT | Mod: 25,S$GLB,, | Performed by: PODIATRIST

## 2024-05-20 PROCEDURE — 3008F BODY MASS INDEX DOCD: CPT | Mod: CPTII,S$GLB,, | Performed by: PODIATRIST

## 2024-05-20 PROCEDURE — 3080F DIAST BP >= 90 MM HG: CPT | Mod: CPTII,S$GLB,, | Performed by: PODIATRIST

## 2024-05-20 PROCEDURE — 73630 X-RAY EXAM OF FOOT: CPT | Mod: TC,50

## 2024-05-20 PROCEDURE — 99999 PR PBB SHADOW E&M-EST. PATIENT-LVL IV: CPT | Mod: PBBFAC,,, | Performed by: PODIATRIST

## 2024-05-20 PROCEDURE — 4010F ACE/ARB THERAPY RXD/TAKEN: CPT | Mod: CPTII,S$GLB,, | Performed by: PODIATRIST

## 2024-05-20 NOTE — TELEPHONE ENCOUNTER
----- Message from Abi Wilkins sent at 5/20/2024 10:34 AM CDT -----  Regarding: Requesting Orders  Contact: pt [phone]  Pt calling to request orders for XR. Pls call

## 2024-05-24 ENCOUNTER — TELEPHONE (OUTPATIENT)
Dept: PODIATRY | Facility: CLINIC | Age: 59
End: 2024-05-24
Payer: COMMERCIAL

## 2024-05-24 NOTE — TELEPHONE ENCOUNTER
Good afternoon, this is Martha I am calling on behalf of our Podiatry department. I am calling to confirm your appointment with Dr. Melara on 5/27 at 8. Thank you and we look forward to seeing you on monday. Have a great day.

## 2024-05-27 ENCOUNTER — OFFICE VISIT (OUTPATIENT)
Dept: PODIATRY | Facility: CLINIC | Age: 59
End: 2024-05-27
Payer: COMMERCIAL

## 2024-05-27 VITALS
HEIGHT: 74 IN | SYSTOLIC BLOOD PRESSURE: 121 MMHG | BODY MASS INDEX: 37.35 KG/M2 | DIASTOLIC BLOOD PRESSURE: 72 MMHG | HEART RATE: 70 BPM | WEIGHT: 291 LBS

## 2024-05-27 DIAGNOSIS — L97.512 FOOT ULCER, RIGHT, WITH FAT LAYER EXPOSED: ICD-10-CM

## 2024-05-27 DIAGNOSIS — E11.49 TYPE II DIABETES MELLITUS WITH NEUROLOGICAL MANIFESTATIONS: ICD-10-CM

## 2024-05-27 DIAGNOSIS — L97.523 ULCER OF LEFT FOOT WITH NECROSIS OF MUSCLE: Primary | ICD-10-CM

## 2024-05-27 PROCEDURE — 11042 DBRDMT SUBQ TIS 1ST 20SQCM/<: CPT | Mod: S$GLB,,, | Performed by: PODIATRIST

## 2024-05-27 PROCEDURE — 3074F SYST BP LT 130 MM HG: CPT | Mod: CPTII,S$GLB,, | Performed by: PODIATRIST

## 2024-05-27 PROCEDURE — 99999 PR PBB SHADOW E&M-EST. PATIENT-LVL IV: CPT | Mod: PBBFAC,,, | Performed by: PODIATRIST

## 2024-05-27 PROCEDURE — 3008F BODY MASS INDEX DOCD: CPT | Mod: CPTII,S$GLB,, | Performed by: PODIATRIST

## 2024-05-27 PROCEDURE — 4010F ACE/ARB THERAPY RXD/TAKEN: CPT | Mod: CPTII,S$GLB,, | Performed by: PODIATRIST

## 2024-05-27 PROCEDURE — 99213 OFFICE O/P EST LOW 20 MIN: CPT | Mod: 25,S$GLB,, | Performed by: PODIATRIST

## 2024-05-27 PROCEDURE — 1159F MED LIST DOCD IN RCRD: CPT | Mod: CPTII,S$GLB,, | Performed by: PODIATRIST

## 2024-05-27 PROCEDURE — 3078F DIAST BP <80 MM HG: CPT | Mod: CPTII,S$GLB,, | Performed by: PODIATRIST

## 2024-05-28 NOTE — PROGRESS NOTES
Subjective:      Patient ID: Billy Rivera is a 58 y.o. male.    Chief Complaint:  Follow-up wound care, right foot.      Billy is a 58 y.o. male who presents to the clinic for evaluation and treatment of high risk feet. Billy has a past medical history of Allergy, CAD (coronary artery disease), native coronary artery (6/25/2013), Cancer, COVID-19 virus detected (9/1/2020), Diabetes mellitus, Diabetes mellitus, type 2, Disorder of kidney and ureter, Heart attack (04/2012), colon cancer, stage I, Hyperlipidemia, Hypertension, Muscular pain, NSTEMI May 2013 - peak troponin 0.22 (5/22/2013), MICHAELA (obstructive sleep apnea), and Retinopathy due to secondary diabetes.  Follow-up with no new issues or complaints today.          PCP: Augie Ruiz MD    Date Last Seen by PCP:   Chief Complaint   Patient presents with    Wound Care     Bilateral ball of foot   Left foot heavy drainage        History of Trauma: negative  Sign of Infection: none        Hemoglobin A1C   Date Value Ref Range Status   07/20/2023 8.7 (H) 4.0 - 5.6 % Final     Comment:     ADA Screening Guidelines:  5.7-6.4%  Consistent with prediabetes  >or=6.5%  Consistent with diabetes    High levels of fetal hemoglobin interfere with the HbA1C  assay. Heterozygous hemoglobin variants (HbS, HgC, etc)do  not significantly interfere with this assay.   However, presence of multiple variants may affect accuracy.     04/06/2023 9.4 (H) 4.0 - 5.6 % Final     Comment:     ADA Screening Guidelines:  5.7-6.4%  Consistent with prediabetes  >or=6.5%  Consistent with diabetes    High levels of fetal hemoglobin interfere with the HbA1C  assay. Heterozygous hemoglobin variants (HbS, HgC, etc)do  not significantly interfere with this assay.   However, presence of multiple variants may affect accuracy.     11/10/2022 7.1 (H) 4.0 - 5.6 % Final     Comment:     ADA Screening Guidelines:  5.7-6.4%  Consistent with prediabetes  >or=6.5%  Consistent with  "diabetes    High levels of fetal hemoglobin interfere with the HbA1C  assay. Heterozygous hemoglobin variants (HbS, HgC, etc)do  not significantly interfere with this assay.   However, presence of multiple variants may affect accuracy.                 Objective:      Vitals:    05/20/24 0955   BP: (!) 166/90   Pulse: 62   Weight: 132 kg (291 lb 0.1 oz)   Height: 6' 2" (1.88 m)   PainSc: 0-No pain        Physical Exam  Constitutional:       General: He is not in acute distress.     Appearance: Normal appearance. He is well-developed and normal weight.   HENT:      Head: Normocephalic and atraumatic.   Cardiovascular:      Pulses:           Dorsalis pedis pulses are 2+ on the right side and 2+ on the left side.        Posterior tibial pulses are 1+ on the right side and 1+ on the left side.      Comments: CFT< 3 secs all toes bilateral foot, skin temp warm to cool bilateral foot, no hair growth bilateral lower extremity, no edema to bilateral lower extremities    Pulmonary:      Effort: Pulmonary effort is normal.   Musculoskeletal:         General: No swelling.      Right foot: Normal range of motion. Deformity present. No foot drop.      Left foot: Normal range of motion. Deformity present. No foot drop.      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     5/5 muscle strength with DF/PF/Inv/Ev bilateral.       Feet:      Right foot:      Protective Sensation: 5 sites tested.  0 sites sensed.      Skin integrity: Ulcer, skin breakdown, callus and dry skin present. No erythema.      Toenail Condition: Right toenails are abnormally thick.      Left foot:      Protective Sensation: 5 sites tested.  0 sites sensed.      Skin integrity: Callus and dry skin present. No ulcer, skin breakdown or erythema.      Toenail Condition: Left toenails are abnormally thick.   Skin:     General: Skin is warm and dry.      Capillary Refill: Capillary refill takes 2 to 3 seconds.      " Findings: No erythema.      Nails: There is no clubbing.      Comments: Ulceration location:  Left plantar 1st MPJ blister noted/upon drainage full-thickness ulceration noted measuring 0.5 cm x 0.5 cm x 0.2 cm.  Purulent drainage noted.  The area was cultured.  These are sub cutaneous measurements as well as post debridement.        Neurological:      Mental Status: He is alert and oriented to person, place, and time.      Sensory: Sensory deficit present.      Motor: No weakness.      Deep Tendon Reflexes:      Reflex Scores:       Patellar reflexes are 2+ on the right side and 2+ on the left side.       Achilles reflexes are 2+ on the right side and 2+ on the left side.     Comments: Diminished/loss of protective sensation all toes bilateral to 10 gram monofilament.   Psychiatric:         Mood and Affect: Mood normal.         Behavior: Behavior normal.         Thought Content: Thought content normal.         Ulcerations appear healed bilateral.    Assessment:       Encounter Diagnoses   Name Primary?    Ulcer of left foot with necrosis of muscle Yes    Foot ulcer, right, with fat layer exposed          Plan:       Billy was seen today for wound care.    Diagnoses and all orders for this visit:    Ulcer of left foot with necrosis of muscle  -     X-Ray Foot Complete Bilateral; Future    Foot ulcer, right, with fat layer exposed  -     X-Ray Foot Complete Bilateral; Future        Independent review of patients previous clinical notes    Reviewed current medication(s), and lab(s) specific to foot ailment(s) with patient in detail. All questions answered     The nature of the condition, options for management, as well as potential risks and complications were discussed in detail with patient. Patient was amenable to my recommendations and left my office fully informed and will follow up as instructed or sooner if necessary.      Wound Debridement    Performed by: Mingo Melara DPM   Authorized by:  "Patient    Time out: Immediately prior to procedure a "time out" was called to verify the correct patient, procedure, equipment, support staff and site/side marked as required.   Consent Done?:  Yes (Verbal)  Local anesthesia used?: No       Wound Details:    Location:  Left foot     Type of Debridement:  Excisional       Length (cm): 0.7       Width (cm):  0.5       Depth (cm):  0.2       Percent Debrided (%):  100             Depth of debridement:  Subcutaneous tissue    Tissue debrided:  Dermis, Epidermis and Subcutaneous    Devitalized tissue debrided:  Biofilm, Callus and Necrotic/Eschar    Instruments:  Blade, Curette and Nippers     Bleeding:  Minimal  Hemostasis Achieved: Yes    Method Used:  Pressure  Patient tolerance:  Patient tolerated the procedure well with no immediate complications    Radiographs ordered.  We will review at next visit.  Wound care discussed in detail.  Follow-up in 1 week.              "

## 2024-06-02 NOTE — PROGRESS NOTES
Subjective:      Patient ID: Billy Rivrea is a 58 y.o. male.    Chief Complaint:  Follow-up wound care, right foot.      Billy is a 58 y.o. male who presents to the clinic for evaluation and treatment of high risk feet. Billy has a past medical history of Allergy, CAD (coronary artery disease), native coronary artery (6/25/2013), Cancer, COVID-19 virus detected (9/1/2020), Diabetes mellitus, Diabetes mellitus, type 2, Disorder of kidney and ureter, Heart attack (04/2012), colon cancer, stage I, Hyperlipidemia, Hypertension, Muscular pain, NSTEMI May 2013 - peak troponin 0.22 (5/22/2013), MICHAELA (obstructive sleep apnea), and Retinopathy due to secondary diabetes.  Follow-up with no new issues or complaints today.          PCP: Augie Ruiz MD    Date Last Seen by PCP:   Chief Complaint   Patient presents with    Wound Care     B/L wound care, B/L drainage       History of Trauma: negative  Sign of Infection: none        Hemoglobin A1C   Date Value Ref Range Status   07/20/2023 8.7 (H) 4.0 - 5.6 % Final     Comment:     ADA Screening Guidelines:  5.7-6.4%  Consistent with prediabetes  >or=6.5%  Consistent with diabetes    High levels of fetal hemoglobin interfere with the HbA1C  assay. Heterozygous hemoglobin variants (HbS, HgC, etc)do  not significantly interfere with this assay.   However, presence of multiple variants may affect accuracy.     04/06/2023 9.4 (H) 4.0 - 5.6 % Final     Comment:     ADA Screening Guidelines:  5.7-6.4%  Consistent with prediabetes  >or=6.5%  Consistent with diabetes    High levels of fetal hemoglobin interfere with the HbA1C  assay. Heterozygous hemoglobin variants (HbS, HgC, etc)do  not significantly interfere with this assay.   However, presence of multiple variants may affect accuracy.     11/10/2022 7.1 (H) 4.0 - 5.6 % Final     Comment:     ADA Screening Guidelines:  5.7-6.4%  Consistent with prediabetes  >or=6.5%  Consistent with diabetes    High levels of  "fetal hemoglobin interfere with the HbA1C  assay. Heterozygous hemoglobin variants (HbS, HgC, etc)do  not significantly interfere with this assay.   However, presence of multiple variants may affect accuracy.                 Objective:      Vitals:    05/27/24 0817   BP: 121/72   Pulse: 70   Weight: 132 kg (291 lb 0.1 oz)   Height: 6' 2" (1.88 m)   PainSc: 0-No pain   PainLoc: Foot        Physical Exam  Constitutional:       General: He is not in acute distress.     Appearance: Normal appearance. He is well-developed and normal weight.   HENT:      Head: Normocephalic and atraumatic.   Cardiovascular:      Pulses:           Dorsalis pedis pulses are 2+ on the right side and 2+ on the left side.        Posterior tibial pulses are 1+ on the right side and 1+ on the left side.      Comments: CFT< 3 secs all toes bilateral foot, skin temp warm to cool bilateral foot, no hair growth bilateral lower extremity, no edema to bilateral lower extremities    Pulmonary:      Effort: Pulmonary effort is normal.   Musculoskeletal:         General: No swelling.      Right foot: Normal range of motion. Deformity present. No foot drop.      Left foot: Normal range of motion. Deformity present. No foot drop.      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     5/5 muscle strength with DF/PF/Inv/Ev bilateral.       Feet:      Right foot:      Protective Sensation: 5 sites tested.  0 sites sensed.      Skin integrity: Ulcer, skin breakdown, callus and dry skin present. No erythema.      Toenail Condition: Right toenails are abnormally thick.      Left foot:      Protective Sensation: 5 sites tested.  0 sites sensed.      Skin integrity: Callus and dry skin present. No ulcer, skin breakdown or erythema.      Toenail Condition: Left toenails are abnormally thick.   Skin:     General: Skin is warm and dry.      Capillary Refill: Capillary refill takes 2 to 3 seconds.      Findings: No erythema. "      Nails: There is no clubbing.      Comments: Ulceration location:  Left plantar 1st MPJ blister noted/upon drainage full-thickness ulceration noted measuring 0.4 cm x 0.3 cm x 0.1 cm.  Purulent drainage noted.  The area was cultured.  These are sub cutaneous measurements as well as post debridement.        Neurological:      Mental Status: He is alert and oriented to person, place, and time.      Sensory: Sensory deficit present.      Motor: No weakness.      Deep Tendon Reflexes:      Reflex Scores:       Patellar reflexes are 2+ on the right side and 2+ on the left side.       Achilles reflexes are 2+ on the right side and 2+ on the left side.     Comments: Diminished/loss of protective sensation all toes bilateral to 10 gram monofilament.   Psychiatric:         Mood and Affect: Mood normal.         Behavior: Behavior normal.         Thought Content: Thought content normal.         Ulcerations appear healed bilateral.    Assessment:       Encounter Diagnoses   Name Primary?    Ulcer of left foot with necrosis of muscle Yes    Foot ulcer, right, with fat layer exposed     Type II diabetes mellitus with neurological manifestations          Plan:       Billy was seen today for wound care.    Diagnoses and all orders for this visit:    Ulcer of left foot with necrosis of muscle    Foot ulcer, right, with fat layer exposed    Type II diabetes mellitus with neurological manifestations        Independent review of patients previous clinical notes    Reviewed current medication(s), and lab(s) specific to foot ailment(s) with patient in detail. All questions answered     The nature of the condition, options for management, as well as potential risks and complications were discussed in detail with patient. Patient was amenable to my recommendations and left my office fully informed and will follow up as instructed or sooner if necessary.      Wound Debridement    Performed by: Mingo Melara DPM   Authorized by:  "Patient    Time out: Immediately prior to procedure a "time out" was called to verify the correct patient, procedure, equipment, support staff and site/side marked as required.   Consent Done?:  Yes (Verbal)  Local anesthesia used?: No       Wound Details:    Location:  Left foot     Type of Debridement:  Excisional       Length (cm): 0.4       Width (cm):  0.3       Depth (cm):  0.1       Percent Debrided (%):  100             Depth of debridement:  Subcutaneous tissue    Tissue debrided:  Dermis, Epidermis and Subcutaneous    Devitalized tissue debrided:  Biofilm, Callus and Necrotic/Eschar    Instruments:  Blade, Curette and Nippers     Bleeding:  Minimal  Hemostasis Achieved: Yes    Method Used:  Pressure  Patient tolerance:  Patient tolerated the procedure well with no immediate complications    Radiographs ordered.  We will review at next visit.  Wound care discussed in detail.  Follow-up in 1 week.                "

## 2024-06-03 ENCOUNTER — OFFICE VISIT (OUTPATIENT)
Dept: PODIATRY | Facility: CLINIC | Age: 59
End: 2024-06-03
Payer: COMMERCIAL

## 2024-06-03 VITALS
HEIGHT: 74 IN | DIASTOLIC BLOOD PRESSURE: 75 MMHG | BODY MASS INDEX: 37.35 KG/M2 | WEIGHT: 291 LBS | HEART RATE: 70 BPM | SYSTOLIC BLOOD PRESSURE: 125 MMHG

## 2024-06-03 DIAGNOSIS — L97.523 ULCER OF LEFT FOOT WITH NECROSIS OF MUSCLE: Primary | ICD-10-CM

## 2024-06-03 DIAGNOSIS — L97.512 FOOT ULCER, RIGHT, WITH FAT LAYER EXPOSED: ICD-10-CM

## 2024-06-03 DIAGNOSIS — E11.49 TYPE II DIABETES MELLITUS WITH NEUROLOGICAL MANIFESTATIONS: ICD-10-CM

## 2024-06-03 PROCEDURE — 3078F DIAST BP <80 MM HG: CPT | Mod: CPTII,S$GLB,, | Performed by: PODIATRIST

## 2024-06-03 PROCEDURE — 4010F ACE/ARB THERAPY RXD/TAKEN: CPT | Mod: CPTII,S$GLB,, | Performed by: PODIATRIST

## 2024-06-03 PROCEDURE — 1159F MED LIST DOCD IN RCRD: CPT | Mod: CPTII,S$GLB,, | Performed by: PODIATRIST

## 2024-06-03 PROCEDURE — 99999 PR PBB SHADOW E&M-EST. PATIENT-LVL IV: CPT | Mod: PBBFAC,,, | Performed by: PODIATRIST

## 2024-06-03 PROCEDURE — 3074F SYST BP LT 130 MM HG: CPT | Mod: CPTII,S$GLB,, | Performed by: PODIATRIST

## 2024-06-03 PROCEDURE — 99213 OFFICE O/P EST LOW 20 MIN: CPT | Mod: 25,S$GLB,, | Performed by: PODIATRIST

## 2024-06-03 PROCEDURE — 3008F BODY MASS INDEX DOCD: CPT | Mod: CPTII,S$GLB,, | Performed by: PODIATRIST

## 2024-06-03 PROCEDURE — 11042 DBRDMT SUBQ TIS 1ST 20SQCM/<: CPT | Mod: S$GLB,,, | Performed by: PODIATRIST

## 2024-06-09 NOTE — PROGRESS NOTES
Subjective:      Patient ID: Billy Rivera is a 59 y.o. male.    Chief Complaint:  Follow-up wound care, right foot.      Billy is a 59 y.o. male who presents to the clinic for evaluation and treatment of high risk feet. Billy has a past medical history of Allergy, CAD (coronary artery disease), native coronary artery (6/25/2013), Cancer, COVID-19 virus detected (9/1/2020), Diabetes mellitus, Diabetes mellitus, type 2, Disorder of kidney and ureter, Heart attack (04/2012), colon cancer, stage I, Hyperlipidemia, Hypertension, Muscular pain, NSTEMI May 2013 - peak troponin 0.22 (5/22/2013), MICHAELA (obstructive sleep apnea), and Retinopathy due to secondary diabetes.  Follow-up with no new issues or complaints today.          PCP: Augie Ruiz MD    Date Last Seen by PCP:   Chief Complaint   Patient presents with    Wound Care     Bilateral        History of Trauma: negative  Sign of Infection: none        Hemoglobin A1C   Date Value Ref Range Status   07/20/2023 8.7 (H) 4.0 - 5.6 % Final     Comment:     ADA Screening Guidelines:  5.7-6.4%  Consistent with prediabetes  >or=6.5%  Consistent with diabetes    High levels of fetal hemoglobin interfere with the HbA1C  assay. Heterozygous hemoglobin variants (HbS, HgC, etc)do  not significantly interfere with this assay.   However, presence of multiple variants may affect accuracy.     04/06/2023 9.4 (H) 4.0 - 5.6 % Final     Comment:     ADA Screening Guidelines:  5.7-6.4%  Consistent with prediabetes  >or=6.5%  Consistent with diabetes    High levels of fetal hemoglobin interfere with the HbA1C  assay. Heterozygous hemoglobin variants (HbS, HgC, etc)do  not significantly interfere with this assay.   However, presence of multiple variants may affect accuracy.     11/10/2022 7.1 (H) 4.0 - 5.6 % Final     Comment:     ADA Screening Guidelines:  5.7-6.4%  Consistent with prediabetes  >or=6.5%  Consistent with diabetes    High levels of fetal hemoglobin  "interfere with the HbA1C  assay. Heterozygous hemoglobin variants (HbS, HgC, etc)do  not significantly interfere with this assay.   However, presence of multiple variants may affect accuracy.                 Objective:      Vitals:    06/03/24 0843   BP: 125/75   Pulse: 70   Weight: 132 kg (291 lb 0.1 oz)   Height: 6' 2" (1.88 m)   PainSc: 0-No pain        Physical Exam  Constitutional:       General: He is not in acute distress.     Appearance: Normal appearance. He is well-developed and normal weight.   HENT:      Head: Normocephalic and atraumatic.   Cardiovascular:      Pulses:           Dorsalis pedis pulses are 2+ on the right side and 2+ on the left side.        Posterior tibial pulses are 1+ on the right side and 1+ on the left side.      Comments: CFT< 3 secs all toes bilateral foot, skin temp warm to cool bilateral foot, no hair growth bilateral lower extremity, no edema to bilateral lower extremities    Pulmonary:      Effort: Pulmonary effort is normal.   Musculoskeletal:         General: No swelling.      Right foot: Normal range of motion. Deformity present. No foot drop.      Left foot: Normal range of motion. Deformity present. No foot drop.      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     5/5 muscle strength with DF/PF/Inv/Ev bilateral.       Feet:      Right foot:      Protective Sensation: 5 sites tested.  0 sites sensed.      Skin integrity: Ulcer, skin breakdown, callus and dry skin present. No erythema.      Toenail Condition: Right toenails are abnormally thick.      Left foot:      Protective Sensation: 5 sites tested.  0 sites sensed.      Skin integrity: Callus and dry skin present. No ulcer, skin breakdown or erythema.      Toenail Condition: Left toenails are abnormally thick.   Skin:     General: Skin is warm and dry.      Capillary Refill: Capillary refill takes 2 to 3 seconds.      Findings: No erythema.      Nails: There is no clubbing. "      Comments: Ulceration location:  Left plantar 1st MPJ blister noted/upon drainage full-thickness ulceration noted measuring 0.3 cm x 0.2 cm x 0.1 cm.  Purulent drainage noted.  The area was cultured.  These are sub cutaneous measurements as well as post debridement.        Neurological:      Mental Status: He is alert and oriented to person, place, and time.      Sensory: Sensory deficit present.      Motor: No weakness.      Deep Tendon Reflexes:      Reflex Scores:       Patellar reflexes are 2+ on the right side and 2+ on the left side.       Achilles reflexes are 2+ on the right side and 2+ on the left side.     Comments: Diminished/loss of protective sensation all toes bilateral to 10 gram monofilament.   Psychiatric:         Mood and Affect: Mood normal.         Behavior: Behavior normal.         Thought Content: Thought content normal.         Ulcerations appear healed bilateral.    Assessment:       Encounter Diagnoses   Name Primary?    Ulcer of left foot with necrosis of muscle Yes    Foot ulcer, right, with fat layer exposed     Type II diabetes mellitus with neurological manifestations          Plan:       Billy was seen today for wound care.    Diagnoses and all orders for this visit:    Ulcer of left foot with necrosis of muscle    Foot ulcer, right, with fat layer exposed    Type II diabetes mellitus with neurological manifestations        Independent review of patients previous clinical notes    Reviewed current medication(s), and lab(s) specific to foot ailment(s) with patient in detail. All questions answered     The nature of the condition, options for management, as well as potential risks and complications were discussed in detail with patient. Patient was amenable to my recommendations and left my office fully informed and will follow up as instructed or sooner if necessary.      Wound Debridement    Performed by: Mingo Melara DPM   Authorized by: Patient    Time out: Immediately  "prior to procedure a "time out" was called to verify the correct patient, procedure, equipment, support staff and site/side marked as required.   Consent Done?:  Yes (Verbal)  Local anesthesia used?: No       Wound Details:    Location:  Left foot     Type of Debridement:  Excisional       Length (cm): 0.3       Width (cm):  0.2       Depth (cm):  0.1       Percent Debrided (%):  100             Depth of debridement:  Subcutaneous tissue    Tissue debrided:  Dermis, Epidermis and Subcutaneous    Devitalized tissue debrided:  Biofilm, Callus and Necrotic/Eschar    Instruments:  Blade, Curette and Nippers     Bleeding:  Minimal  Hemostasis Achieved: Yes    Method Used:  Pressure  Patient tolerance:  Patient tolerated the procedure well with no immediate complications    Wound care discussed in detail.  Follow-up in 1 week.                  "

## 2024-06-10 ENCOUNTER — OFFICE VISIT (OUTPATIENT)
Dept: PODIATRY | Facility: CLINIC | Age: 59
End: 2024-06-10
Payer: COMMERCIAL

## 2024-06-10 VITALS
HEIGHT: 74 IN | BODY MASS INDEX: 37.36 KG/M2 | SYSTOLIC BLOOD PRESSURE: 130 MMHG | DIASTOLIC BLOOD PRESSURE: 73 MMHG | HEART RATE: 63 BPM

## 2024-06-10 DIAGNOSIS — E11.49 TYPE II DIABETES MELLITUS WITH NEUROLOGICAL MANIFESTATIONS: Primary | ICD-10-CM

## 2024-06-10 DIAGNOSIS — L97.522 FOOT ULCER, LEFT, WITH FAT LAYER EXPOSED: ICD-10-CM

## 2024-06-10 DIAGNOSIS — Z89.412 LEFT GREAT TOE AMPUTEE: ICD-10-CM

## 2024-06-10 DIAGNOSIS — L97.512 FOOT ULCER, RIGHT, WITH FAT LAYER EXPOSED: ICD-10-CM

## 2024-06-10 PROCEDURE — 1160F RVW MEDS BY RX/DR IN RCRD: CPT | Mod: CPTII,S$GLB,, | Performed by: PODIATRIST

## 2024-06-10 PROCEDURE — 3008F BODY MASS INDEX DOCD: CPT | Mod: CPTII,S$GLB,, | Performed by: PODIATRIST

## 2024-06-10 PROCEDURE — 87076 CULTURE ANAEROBE IDENT EACH: CPT | Performed by: PODIATRIST

## 2024-06-10 PROCEDURE — 99999 PR PBB SHADOW E&M-EST. PATIENT-LVL IV: CPT | Mod: PBBFAC,,, | Performed by: PODIATRIST

## 2024-06-10 PROCEDURE — 1159F MED LIST DOCD IN RCRD: CPT | Mod: CPTII,S$GLB,, | Performed by: PODIATRIST

## 2024-06-10 PROCEDURE — 3078F DIAST BP <80 MM HG: CPT | Mod: CPTII,S$GLB,, | Performed by: PODIATRIST

## 2024-06-10 PROCEDURE — 87186 SC STD MICRODIL/AGAR DIL: CPT | Mod: 59 | Performed by: PODIATRIST

## 2024-06-10 PROCEDURE — 87075 CULTR BACTERIA EXCEPT BLOOD: CPT | Performed by: PODIATRIST

## 2024-06-10 PROCEDURE — 87077 CULTURE AEROBIC IDENTIFY: CPT | Mod: 59 | Performed by: PODIATRIST

## 2024-06-10 PROCEDURE — 11042 DBRDMT SUBQ TIS 1ST 20SQCM/<: CPT | Mod: S$GLB,,, | Performed by: PODIATRIST

## 2024-06-10 PROCEDURE — 99214 OFFICE O/P EST MOD 30 MIN: CPT | Mod: 25,S$GLB,, | Performed by: PODIATRIST

## 2024-06-10 PROCEDURE — 4010F ACE/ARB THERAPY RXD/TAKEN: CPT | Mod: CPTII,S$GLB,, | Performed by: PODIATRIST

## 2024-06-10 PROCEDURE — 87070 CULTURE OTHR SPECIMN AEROBIC: CPT | Performed by: PODIATRIST

## 2024-06-10 PROCEDURE — 3075F SYST BP GE 130 - 139MM HG: CPT | Mod: CPTII,S$GLB,, | Performed by: PODIATRIST

## 2024-06-10 RX ORDER — DOXYCYCLINE 100 MG/1
100 CAPSULE ORAL 2 TIMES DAILY
Qty: 14 CAPSULE | Refills: 0 | Status: SHIPPED | OUTPATIENT
Start: 2024-06-10 | End: 2024-06-17

## 2024-06-10 NOTE — PROCEDURES
"Wound Debridement    Date/Time: 6/10/2024 8:00 AM    Performed by: Emelia Brock DPM  Authorized by: Emelia Brock DPM    Time out: Immediately prior to procedure a "time out" was called to verify the correct patient, procedure, equipment, support staff and site/side marked as required.    Consent Done?:  Yes (Verbal)    Preparation: Patient was prepped and draped in usual sterile fashion, Patient was prepped and draped with aseptic technique and Patient was prepped and draped with clean technique    Local anesthesia used?: No      Wound Details:    Location:  Left foot    Location:  Left 1st Metatarsal Head    Type of Debridement:  Excisional       Length (cm):  1       Area (sq cm):  0.9       Width (cm):  0.9       Percent Debrided (%):  100       Depth (cm):  0.1       Total Area Debrided (sq cm):  0.9    Depth of debridement:  Subcutaneous tissue    Tissue debrided:  Dermis, Epidermis and Subcutaneous    Devitalized tissue debrided:  Biofilm, Callus and Fibrin    Instruments:  Curette  Bleeding:  Minimal  Hemostasis Achieved: Yes  Method Used:  Pressure    Additional wounds:  1    2nd Wound Details:     Location:  Right foot    Location:  Right 2nd Metatarsal Head    Location:  Right 2nd Metatarsal Head    Type of Debridement:  Excisional       Length (cm):  0.5       Area (sq cm):  0.1       Width (cm):  0.2       Percent Debrided (%):  100       Depth (cm):  0.1       Total Area Debrided (sq cm):  0.1    Depth of debridement:  Subcutaneous tissue    Tissue debrided:  Dermis, Epidermis and Subcutaneous    Devitalized tissue debrided:  Biofilm, Callus and Fibrin    Instruments:  Curette  Bleeding:  Minimal  Hemostasis Achieved: Yes    Method Used:  Pressure  Patient tolerance:  Patient tolerated the procedure well with no immediate complications  1st Wound Pain Assessment: 0  2nd Wound Pain Assessment: 0  Specimen Collected: Specimen sent to microbiology    "

## 2024-06-10 NOTE — PROGRESS NOTES
Subjective:      Patient ID: Billy Rivera is a 59 y.o. male.    Chief Complaint:  Follow-up wound care, right foot.      Billy is a 59 y.o. male who presents to the clinic for evaluation and treatment of high risk feet. Billy has a past medical history of Allergy, CAD (coronary artery disease), native coronary artery (6/25/2013), Cancer, COVID-19 virus detected (9/1/2020), Diabetes mellitus, Diabetes mellitus, type 2, Disorder of kidney and ureter, Heart attack (04/2012), colon cancer, stage I, Hyperlipidemia, Hypertension, Muscular pain, NSTEMI May 2013 - peak troponin 0.22 (5/22/2013), MICHAELA (obstructive sleep apnea), and Retinopathy due to secondary diabetes.  Follow-up with foot ulcers on  both feet. No new issues. Has Football and darco shoes on as directed.           PCP: Augie Ruiz MD    Date Last Seen by PCP:   Chief Complaint   Patient presents with    Wound Check     Bilateral     Diabetes Mellitus     PCP-04/03/2024  Augie Ruiz MD       History of Trauma: negative  Sign of Infection: none        Hemoglobin A1C   Date Value Ref Range Status   07/20/2023 8.7 (H) 4.0 - 5.6 % Final     Comment:     ADA Screening Guidelines:  5.7-6.4%  Consistent with prediabetes  >or=6.5%  Consistent with diabetes    High levels of fetal hemoglobin interfere with the HbA1C  assay. Heterozygous hemoglobin variants (HbS, HgC, etc)do  not significantly interfere with this assay.   However, presence of multiple variants may affect accuracy.     04/06/2023 9.4 (H) 4.0 - 5.6 % Final     Comment:     ADA Screening Guidelines:  5.7-6.4%  Consistent with prediabetes  >or=6.5%  Consistent with diabetes    High levels of fetal hemoglobin interfere with the HbA1C  assay. Heterozygous hemoglobin variants (HbS, HgC, etc)do  not significantly interfere with this assay.   However, presence of multiple variants may affect accuracy.     11/10/2022 7.1 (H) 4.0 - 5.6 % Final     Comment:     ADA Screening  "Guidelines:  5.7-6.4%  Consistent with prediabetes  >or=6.5%  Consistent with diabetes    High levels of fetal hemoglobin interfere with the HbA1C  assay. Heterozygous hemoglobin variants (HbS, HgC, etc)do  not significantly interfere with this assay.   However, presence of multiple variants may affect accuracy.                 Objective:      Vitals:    06/10/24 0753   BP: 130/73   Pulse: 63   Height: 6' 2" (1.88 m)   PainSc: 0-No pain        Physical Exam  Constitutional:       General: He is not in acute distress.     Appearance: Normal appearance. He is well-developed and normal weight.   HENT:      Head: Normocephalic and atraumatic.   Cardiovascular:      Pulses:           Dorsalis pedis pulses are 2+ on the right side and 2+ on the left side.        Posterior tibial pulses are 1+ on the right side and 1+ on the left side.      Comments: CFT< 3 secs all toes bilateral foot, skin temp warm to cool bilateral foot, no hair growth bilateral lower extremity, no edema to bilateral lower extremities    Pulmonary:      Effort: Pulmonary effort is normal.   Musculoskeletal:         General: No swelling.      Right foot: Normal range of motion. Deformity present. No foot drop.      Left foot: Normal range of motion. Deformity present. No foot drop.      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     5/5 muscle strength with DF/PF/Inv/Ev bilateral.       Feet:      Right foot:      Protective Sensation: 5 sites tested.  0 sites sensed.      Skin integrity: Ulcer, skin breakdown, callus and dry skin present. No erythema.      Toenail Condition: Right toenails are abnormally thick.      Left foot:      Protective Sensation: 5 sites tested.  0 sites sensed.      Skin integrity: Callus and dry skin present. No ulcer, skin breakdown or erythema.      Toenail Condition: Left toenails are abnormally thick.   Skin:     General: Skin is warm and dry.      Capillary Refill: Capillary " refill takes 2 to 3 seconds.      Findings: No erythema.      Nails: There is no clubbing.      Comments: Ulceration location:  Left plantar 1st MPJ blister noted/upon drainage full-thickness ulceration noted measuring 1.0 x 0.9 cm x 0.1 cm.  Serous drainage. No erythema. Mild odor present.     R plantar 2nd met head ulceration measuring 0.5 x 0.2 x 0.1cm. Serous drainage. No erythema. No odor present.       Neurological:      Mental Status: He is alert and oriented to person, place, and time.      Sensory: Sensory deficit present.      Motor: No weakness.      Deep Tendon Reflexes:      Reflex Scores:       Patellar reflexes are 2+ on the right side and 2+ on the left side.       Achilles reflexes are 2+ on the right side and 2+ on the left side.     Comments: Diminished/loss of protective sensation all toes bilateral to 10 gram monofilament.   Psychiatric:         Mood and Affect: Mood normal.         Behavior: Behavior normal.         Thought Content: Thought content normal.         Ulcerations appear healed bilateral.    Assessment:       Encounter Diagnoses   Name Primary?    Type II diabetes mellitus with neurological manifestations Yes    Left great toe amputee     Foot ulcer, right, with fat layer exposed     Foot ulcer, left, with fat layer exposed            Plan:       Billy was seen today for wound check and diabetes mellitus.    Diagnoses and all orders for this visit:    Type II diabetes mellitus with neurological manifestations    Left great toe amputee    Foot ulcer, right, with fat layer exposed    Foot ulcer, left, with fat layer exposed  -     Aerobic culture  -     Culture, Anaerobic    Other orders  -     doxycycline (VIBRAMYCIN) 100 MG Cap; Take 1 capsule (100 mg total) by mouth 2 (two) times daily. for 7 days          Independent review of patients previous clinical notes    Reviewed current medication(s), and lab(s) specific to foot ailment(s) with patient in detail. All questions answered       Excisional debridement preformed of b/l wounds today. See separate procedure note.     Home health orders placed? No, patient returns weekly.     Iodosorb applied directly to wound bed after cleansing with alcohol. Applied Foam, football dressing to affected area. Dressing and foot/wound is to stay clean, dry and dressing to stay intact until follow up. Darco shoe dispensed and applied to the affected foot. Patient was advised to wear Darco shoe for ambulation at ALL times. Advised not to get dressing wet, remove dressing until follow up or walk directly on football dressing.     Doxycycline 100mg  BID for 7 days given mild odor.     Aerobic/anaerobic cultures taken of L foot wound. Rx abx as above, take as directed and will update based on culture results. Watch for signs of worsening infection and advised to call or report to ED immediately.     RTC 1 week, sooner PRN

## 2024-06-15 LAB
BACTERIA SPEC AEROBE CULT: ABNORMAL
BACTERIA SPEC AEROBE CULT: ABNORMAL

## 2024-06-17 LAB
BACTERIA SPEC ANAEROBE CULT: ABNORMAL
BACTERIA SPEC ANAEROBE CULT: ABNORMAL

## 2024-06-20 ENCOUNTER — OFFICE VISIT (OUTPATIENT)
Dept: PODIATRY | Facility: CLINIC | Age: 59
End: 2024-06-20
Payer: COMMERCIAL

## 2024-06-20 VITALS
HEART RATE: 62 BPM | WEIGHT: 291 LBS | SYSTOLIC BLOOD PRESSURE: 150 MMHG | HEIGHT: 74 IN | RESPIRATION RATE: 18 BRPM | BODY MASS INDEX: 37.35 KG/M2 | DIASTOLIC BLOOD PRESSURE: 84 MMHG

## 2024-06-20 DIAGNOSIS — Z89.412 LEFT GREAT TOE AMPUTEE: ICD-10-CM

## 2024-06-20 DIAGNOSIS — L97.512 FOOT ULCER, RIGHT, WITH FAT LAYER EXPOSED: ICD-10-CM

## 2024-06-20 DIAGNOSIS — E11.49 TYPE II DIABETES MELLITUS WITH NEUROLOGICAL MANIFESTATIONS: Primary | ICD-10-CM

## 2024-06-20 DIAGNOSIS — L97.522 FOOT ULCER, LEFT, WITH FAT LAYER EXPOSED: ICD-10-CM

## 2024-06-20 PROCEDURE — 11042 DBRDMT SUBQ TIS 1ST 20SQCM/<: CPT | Mod: S$GLB,,, | Performed by: PODIATRIST

## 2024-06-20 PROCEDURE — 99499 UNLISTED E&M SERVICE: CPT | Mod: S$GLB,,, | Performed by: PODIATRIST

## 2024-06-20 PROCEDURE — 99999 PR PBB SHADOW E&M-EST. PATIENT-LVL V: CPT | Mod: PBBFAC,,, | Performed by: PODIATRIST

## 2024-06-20 NOTE — PROGRESS NOTES
Subjective:      Patient ID: Billy Rivera is a 59 y.o. male.    Chief Complaint:  Follow-up wound care, right foot.      Billy is a 59 y.o. male who presents to the clinic for evaluation and treatment of high risk feet. Billy has a past medical history of Allergy, CAD (coronary artery disease), native coronary artery (6/25/2013), Cancer, COVID-19 virus detected (9/1/2020), Diabetes mellitus, Diabetes mellitus, type 2, Disorder of kidney and ureter, Heart attack (04/2012), colon cancer, stage I, Hyperlipidemia, Hypertension, Muscular pain, NSTEMI May 2013 - peak troponin 0.22 (5/22/2013), MICHAELA (obstructive sleep apnea), and Retinopathy due to secondary diabetes.  Patient has been in football dressing, ambulating in Darco shoe x 1 week with out issues     PCP: Augie Ruiz MD    Date Last Seen by PCP:   Chief Complaint   Patient presents with    Wound Care     Bilateral foot ulcers     Dressing Change     Bilateral footballs        Hemoglobin A1C   Date Value Ref Range Status   07/20/2023 8.7 (H) 4.0 - 5.6 % Final     Comment:     ADA Screening Guidelines:  5.7-6.4%  Consistent with prediabetes  >or=6.5%  Consistent with diabetes    High levels of fetal hemoglobin interfere with the HbA1C  assay. Heterozygous hemoglobin variants (HbS, HgC, etc)do  not significantly interfere with this assay.   However, presence of multiple variants may affect accuracy.     04/06/2023 9.4 (H) 4.0 - 5.6 % Final     Comment:     ADA Screening Guidelines:  5.7-6.4%  Consistent with prediabetes  >or=6.5%  Consistent with diabetes    High levels of fetal hemoglobin interfere with the HbA1C  assay. Heterozygous hemoglobin variants (HbS, HgC, etc)do  not significantly interfere with this assay.   However, presence of multiple variants may affect accuracy.     11/10/2022 7.1 (H) 4.0 - 5.6 % Final     Comment:     ADA Screening Guidelines:  5.7-6.4%  Consistent with prediabetes  >or=6.5%  Consistent with diabetes    High  "levels of fetal hemoglobin interfere with the HbA1C  assay. Heterozygous hemoglobin variants (HbS, HgC, etc)do  not significantly interfere with this assay.   However, presence of multiple variants may affect accuracy.           Objective:      Vitals:    06/20/24 0946   BP: (!) 150/84   Pulse: 62   Resp: 18   Weight: 132 kg (291 lb 0.1 oz)   Height: 6' 2" (1.88 m)   PainSc: 0-No pain        Physical Exam  Vitals reviewed.   Constitutional:       General: He is not in acute distress.     Appearance: Normal appearance. He is well-developed and normal weight. He is not ill-appearing or diaphoretic.   HENT:      Head: Normocephalic and atraumatic.   Cardiovascular:      Pulses:           Dorsalis pedis pulses are 2+ on the right side and 2+ on the left side.        Posterior tibial pulses are 1+ on the right side and 1+ on the left side.      Comments: CFT< 3 secs all toes bilateral foot, skin temp warm to cool bilateral foot, no hair growth bilateral lower extremity, no edema to bilateral lower extremities    Pulmonary:      Effort: Pulmonary effort is normal.   Musculoskeletal:         General: No swelling.      Right foot: Normal range of motion. Deformity present. No foot drop.      Left foot: Normal range of motion. Deformity present. No foot drop.      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     5/5 muscle strength with DF/PF/Inv/Ev bilateral.       Feet:      Right foot:      Protective Sensation: 5 sites tested.  0 sites sensed.      Skin integrity: Ulcer, skin breakdown, callus and dry skin present. No erythema.      Toenail Condition: Right toenails are abnormally thick.      Left foot:      Protective Sensation: 5 sites tested.  0 sites sensed.      Skin integrity: Callus and dry skin present. No ulcer, skin breakdown or erythema.      Toenail Condition: Left toenails are abnormally thick.   Skin:     General: Skin is warm and dry.      Capillary Refill: " Capillary refill takes 2 to 3 seconds.      Findings: No erythema.      Nails: There is no clubbing.   Neurological:      Mental Status: He is alert and oriented to person, place, and time.      Sensory: Sensory deficit present.      Motor: No weakness.      Deep Tendon Reflexes:      Reflex Scores:       Patellar reflexes are 2+ on the right side and 2+ on the left side.       Achilles reflexes are 2+ on the right side and 2+ on the left side.     Comments: Diminished/loss of protective sensation all toes bilateral to 10 gram monofilament.   Psychiatric:         Mood and Affect: Mood normal.         Behavior: Behavior normal.         Thought Content: Thought content normal.       6.20.24:   L foot : 0.1x0.2x0.1 cm ulceration with granular base.  Periwound with soft boggy macerated tissue observed.  No erythema no edema no malodor no deep probe.    R foot ulceration measuring 0.5 x 0 .2 x 0.2 cm with a healthy granular base.  Periwound hyperkeratosis with periwound skin intact.  Dry.  No malodor no drainage no erythema no edema.      Assessment:       Encounter Diagnoses   Name Primary?    Type II diabetes mellitus with neurological manifestations Yes    Left great toe amputee     Foot ulcer, right, with fat layer exposed     Foot ulcer, left, with fat layer exposed            Plan:       Billy was seen today for wound care and dressing change.    Diagnoses and all orders for this visit:    Type II diabetes mellitus with neurological manifestations    Left great toe amputee    Foot ulcer, right, with fat layer exposed    Foot ulcer, left, with fat layer exposed      Independent review of patients previous clinical notes    Reviewed current medication(s), and lab(s) specific to foot ailment(s) with patient in detail. All questions answered     Debridement: With verbal consent, nonviable tissues on the bilateral foot were debrided beyond sub q utilizing a  sterile No. 3 scalpel and forceps. Minimal bleeding  controlled with direct pressure  The patient tolerated this well.     Dressings: Hydrofera Blue ready  Offloading:Ibis TEJEDA MA assisted w/ application of football dressing under my direct supervision. Darco shoe applied to offload the area. Advised patient that this should be worn on the affected foot at all times when ambulating     Follow-up:Patient is to return to the clinic in 1 week  for follow-up but should call [facility] immediately if any signs of infection, such as fever, chills, sweats, increased redness or pain.    Short-term goals include maintaining good offloading and minimizing bioburden, promoting granulation and epithelialization to healing.  Long-term goals include keeping the wound healed by good offloading and medical management under the direction of internist.

## 2024-06-26 ENCOUNTER — OFFICE VISIT (OUTPATIENT)
Dept: PODIATRY | Facility: CLINIC | Age: 59
End: 2024-06-26
Payer: COMMERCIAL

## 2024-06-26 VITALS — BODY MASS INDEX: 37.36 KG/M2 | HEIGHT: 74 IN

## 2024-06-26 DIAGNOSIS — Z89.412 LEFT GREAT TOE AMPUTEE: ICD-10-CM

## 2024-06-26 DIAGNOSIS — E11.49 TYPE II DIABETES MELLITUS WITH NEUROLOGICAL MANIFESTATIONS: Primary | ICD-10-CM

## 2024-06-26 DIAGNOSIS — L97.512 FOOT ULCER, RIGHT, WITH FAT LAYER EXPOSED: ICD-10-CM

## 2024-06-26 PROCEDURE — 4010F ACE/ARB THERAPY RXD/TAKEN: CPT | Mod: CPTII,S$GLB,, | Performed by: PODIATRIST

## 2024-06-26 PROCEDURE — 11042 DBRDMT SUBQ TIS 1ST 20SQCM/<: CPT | Mod: LT,S$GLB,, | Performed by: PODIATRIST

## 2024-06-26 PROCEDURE — 99999 PR PBB SHADOW E&M-EST. PATIENT-LVL II: CPT | Mod: PBBFAC,,, | Performed by: PODIATRIST

## 2024-06-26 PROCEDURE — 99213 OFFICE O/P EST LOW 20 MIN: CPT | Mod: 25,S$GLB,, | Performed by: PODIATRIST

## 2024-06-26 PROCEDURE — 3008F BODY MASS INDEX DOCD: CPT | Mod: CPTII,S$GLB,, | Performed by: PODIATRIST

## 2024-07-02 ENCOUNTER — OFFICE VISIT (OUTPATIENT)
Dept: PODIATRY | Facility: CLINIC | Age: 59
End: 2024-07-02
Payer: COMMERCIAL

## 2024-07-02 VITALS
HEART RATE: 60 BPM | DIASTOLIC BLOOD PRESSURE: 85 MMHG | SYSTOLIC BLOOD PRESSURE: 128 MMHG | WEIGHT: 291 LBS | HEIGHT: 74 IN | RESPIRATION RATE: 18 BRPM | BODY MASS INDEX: 37.35 KG/M2

## 2024-07-02 DIAGNOSIS — L97.522 FOOT ULCER, LEFT, WITH FAT LAYER EXPOSED: ICD-10-CM

## 2024-07-02 DIAGNOSIS — E11.49 TYPE II DIABETES MELLITUS WITH NEUROLOGICAL MANIFESTATIONS: Primary | ICD-10-CM

## 2024-07-02 DIAGNOSIS — L97.512 FOOT ULCER, RIGHT, WITH FAT LAYER EXPOSED: ICD-10-CM

## 2024-07-02 PROCEDURE — 99213 OFFICE O/P EST LOW 20 MIN: CPT | Mod: S$GLB,,, | Performed by: PODIATRIST

## 2024-07-02 PROCEDURE — 99999 PR PBB SHADOW E&M-EST. PATIENT-LVL IV: CPT | Mod: PBBFAC,,, | Performed by: PODIATRIST

## 2024-07-02 PROCEDURE — 3008F BODY MASS INDEX DOCD: CPT | Mod: CPTII,S$GLB,, | Performed by: PODIATRIST

## 2024-07-02 PROCEDURE — 4010F ACE/ARB THERAPY RXD/TAKEN: CPT | Mod: CPTII,S$GLB,, | Performed by: PODIATRIST

## 2024-07-02 PROCEDURE — 3079F DIAST BP 80-89 MM HG: CPT | Mod: CPTII,S$GLB,, | Performed by: PODIATRIST

## 2024-07-02 PROCEDURE — 3074F SYST BP LT 130 MM HG: CPT | Mod: CPTII,S$GLB,, | Performed by: PODIATRIST

## 2024-07-05 ENCOUNTER — PATIENT MESSAGE (OUTPATIENT)
Dept: ADMINISTRATIVE | Facility: HOSPITAL | Age: 59
End: 2024-07-05
Payer: COMMERCIAL

## 2024-07-07 NOTE — PROGRESS NOTES
Subjective:      Patient ID: Billy Rivera is a 59 y.o. male.    Chief Complaint:  Follow-up wound care, right foot.      Billy is a 59 y.o. male who presents to the clinic for evaluation and treatment of high risk feet. Billy has a past medical history of Allergy, CAD (coronary artery disease), native coronary artery (6/25/2013), Cancer, COVID-19 virus detected (9/1/2020), Diabetes mellitus, Diabetes mellitus, type 2, Disorder of kidney and ureter, Heart attack (04/2012), colon cancer, stage I, Hyperlipidemia, Hypertension, Muscular pain, NSTEMI May 2013 - peak troponin 0.22 (5/22/2013), MICHAELA (obstructive sleep apnea), and Retinopathy due to secondary diabetes.  Follow-up with no new issues or complaints today.          PCP: Augie Ruiz MD    Date Last Seen by PCP:   Chief Complaint   Patient presents with    Wound Care     Bilateral        History of Trauma: negative  Sign of Infection: none        Hemoglobin A1C   Date Value Ref Range Status   07/20/2023 8.7 (H) 4.0 - 5.6 % Final     Comment:     ADA Screening Guidelines:  5.7-6.4%  Consistent with prediabetes  >or=6.5%  Consistent with diabetes    High levels of fetal hemoglobin interfere with the HbA1C  assay. Heterozygous hemoglobin variants (HbS, HgC, etc)do  not significantly interfere with this assay.   However, presence of multiple variants may affect accuracy.     04/06/2023 9.4 (H) 4.0 - 5.6 % Final     Comment:     ADA Screening Guidelines:  5.7-6.4%  Consistent with prediabetes  >or=6.5%  Consistent with diabetes    High levels of fetal hemoglobin interfere with the HbA1C  assay. Heterozygous hemoglobin variants (HbS, HgC, etc)do  not significantly interfere with this assay.   However, presence of multiple variants may affect accuracy.     11/10/2022 7.1 (H) 4.0 - 5.6 % Final     Comment:     ADA Screening Guidelines:  5.7-6.4%  Consistent with prediabetes  >or=6.5%  Consistent with diabetes    High levels of fetal hemoglobin  "interfere with the HbA1C  assay. Heterozygous hemoglobin variants (HbS, HgC, etc)do  not significantly interfere with this assay.   However, presence of multiple variants may affect accuracy.                 Objective:      Vitals:    06/26/24 0950   Height: 6' 2" (1.88 m)   PainSc: 0-No pain        Physical Exam  Constitutional:       General: He is not in acute distress.     Appearance: Normal appearance. He is well-developed and normal weight.   HENT:      Head: Normocephalic and atraumatic.   Cardiovascular:      Pulses:           Dorsalis pedis pulses are 2+ on the right side and 2+ on the left side.        Posterior tibial pulses are 1+ on the right side and 1+ on the left side.      Comments: CFT< 3 secs all toes bilateral foot, skin temp warm to cool bilateral foot, no hair growth bilateral lower extremity, no edema to bilateral lower extremities    Pulmonary:      Effort: Pulmonary effort is normal.   Musculoskeletal:         General: No swelling.      Right foot: Normal range of motion. Deformity present. No foot drop.      Left foot: Normal range of motion. Deformity present. No foot drop.      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     5/5 muscle strength with DF/PF/Inv/Ev bilateral.       Feet:      Right foot:      Protective Sensation: 5 sites tested.  0 sites sensed.      Skin integrity: Ulcer, skin breakdown, callus and dry skin present. No erythema.      Toenail Condition: Right toenails are abnormally thick.      Left foot:      Protective Sensation: 5 sites tested.  0 sites sensed.      Skin integrity: Callus and dry skin present. No ulcer, skin breakdown or erythema.      Toenail Condition: Left toenails are abnormally thick.   Skin:     General: Skin is warm and dry.      Capillary Refill: Capillary refill takes 2 to 3 seconds.      Findings: No erythema.      Nails: There is no clubbing.      Comments: Ulceration location:  Left plantar 1st MPJ " "blister noted/upon drainage full-thickness ulceration noted measuring 0.1 cm x 0.1 cm x 0.1 cm.  Purulent drainage noted.  The area was cultured.  These are sub cutaneous measurements as well as post debridement.        Neurological:      Mental Status: He is alert and oriented to person, place, and time.      Sensory: Sensory deficit present.      Motor: No weakness.      Deep Tendon Reflexes:      Reflex Scores:       Patellar reflexes are 2+ on the right side and 2+ on the left side.       Achilles reflexes are 2+ on the right side and 2+ on the left side.     Comments: Diminished/loss of protective sensation all toes bilateral to 10 gram monofilament.   Psychiatric:         Mood and Affect: Mood normal.         Behavior: Behavior normal.         Thought Content: Thought content normal.       Ulcerations appear healed bilateral.    Assessment:       Encounter Diagnoses   Name Primary?    Type II diabetes mellitus with neurological manifestations Yes    Left great toe amputee     Foot ulcer, right, with fat layer exposed          Plan:       Billy was seen today for wound care.    Diagnoses and all orders for this visit:    Type II diabetes mellitus with neurological manifestations    Left great toe amputee    Foot ulcer, right, with fat layer exposed        Independent review of patients previous clinical notes    Reviewed current medication(s), and lab(s) specific to foot ailment(s) with patient in detail. All questions answered     The nature of the condition, options for management, as well as potential risks and complications were discussed in detail with patient. Patient was amenable to my recommendations and left my office fully informed and will follow up as instructed or sooner if necessary.      Wound Debridement    Performed by: Mingo Melara DPM   Authorized by: Patient    Time out: Immediately prior to procedure a "time out" was called to verify the correct patient, procedure, equipment, " support staff and site/side marked as required.   Consent Done?:  Yes (Verbal)  Local anesthesia used?: No       Wound Details:    Location:  Left foot     Type of Debridement:  Excisional       Length (cm): 0.1       Width (cm):  0.1       Depth (cm):  0.1       Percent Debrided (%):  100             Depth of debridement:  Subcutaneous tissue    Tissue debrided:  Dermis, Epidermis and Subcutaneous    Devitalized tissue debrided:  Biofilm, Callus and Necrotic/Eschar    Instruments:  Blade, Curette and Nippers     Bleeding:  Minimal  Hemostasis Achieved: Yes    Method Used:  Pressure  Patient tolerance:  Patient tolerated the procedure well with no immediate complications    Wound care discussed in detail.  Follow-up in 1 week.

## 2024-07-09 ENCOUNTER — OFFICE VISIT (OUTPATIENT)
Dept: PODIATRY | Facility: CLINIC | Age: 59
End: 2024-07-09
Payer: COMMERCIAL

## 2024-07-09 VITALS
BODY MASS INDEX: 37.36 KG/M2 | HEIGHT: 74 IN | SYSTOLIC BLOOD PRESSURE: 135 MMHG | DIASTOLIC BLOOD PRESSURE: 78 MMHG | HEART RATE: 68 BPM

## 2024-07-09 DIAGNOSIS — E11.49 TYPE II DIABETES MELLITUS WITH NEUROLOGICAL MANIFESTATIONS: Primary | ICD-10-CM

## 2024-07-09 DIAGNOSIS — L97.512 FOOT ULCER, RIGHT, WITH FAT LAYER EXPOSED: ICD-10-CM

## 2024-07-09 DIAGNOSIS — L97.522 FOOT ULCER, LEFT, WITH FAT LAYER EXPOSED: ICD-10-CM

## 2024-07-09 PROCEDURE — 1159F MED LIST DOCD IN RCRD: CPT | Mod: CPTII,S$GLB,, | Performed by: PODIATRIST

## 2024-07-09 PROCEDURE — 4010F ACE/ARB THERAPY RXD/TAKEN: CPT | Mod: CPTII,S$GLB,, | Performed by: PODIATRIST

## 2024-07-09 PROCEDURE — 99213 OFFICE O/P EST LOW 20 MIN: CPT | Mod: S$GLB,,, | Performed by: PODIATRIST

## 2024-07-09 PROCEDURE — 3008F BODY MASS INDEX DOCD: CPT | Mod: CPTII,S$GLB,, | Performed by: PODIATRIST

## 2024-07-09 PROCEDURE — 3078F DIAST BP <80 MM HG: CPT | Mod: CPTII,S$GLB,, | Performed by: PODIATRIST

## 2024-07-09 PROCEDURE — 3075F SYST BP GE 130 - 139MM HG: CPT | Mod: CPTII,S$GLB,, | Performed by: PODIATRIST

## 2024-07-09 PROCEDURE — 99999 PR PBB SHADOW E&M-EST. PATIENT-LVL IV: CPT | Mod: PBBFAC,,, | Performed by: PODIATRIST

## 2024-07-11 NOTE — PROGRESS NOTES
Subjective:      Patient ID: Billy Rivera is a 59 y.o. male.    Chief Complaint:  Follow-up wound care, right foot.      Billy is a 59 y.o. male who presents to the clinic for evaluation and treatment of high risk feet. Billy has a past medical history of Allergy, CAD (coronary artery disease), native coronary artery (6/25/2013), Cancer, COVID-19 virus detected (9/1/2020), Diabetes mellitus, Diabetes mellitus, type 2, Disorder of kidney and ureter, Heart attack (04/2012), colon cancer, stage I, Hyperlipidemia, Hypertension, Muscular pain, NSTEMI May 2013 - peak troponin 0.22 (5/22/2013), MICHAELA (obstructive sleep apnea), and Retinopathy due to secondary diabetes.  Follow-up with no new issues or complaints today.          PCP: Augie Ruiz MD    Date Last Seen by PCP:   Chief Complaint   Patient presents with    Wound Care     Bilateral wound check     Dressing Change     Football        History of Trauma: negative  Sign of Infection: none        Hemoglobin A1C   Date Value Ref Range Status   07/20/2023 8.7 (H) 4.0 - 5.6 % Final     Comment:     ADA Screening Guidelines:  5.7-6.4%  Consistent with prediabetes  >or=6.5%  Consistent with diabetes    High levels of fetal hemoglobin interfere with the HbA1C  assay. Heterozygous hemoglobin variants (HbS, HgC, etc)do  not significantly interfere with this assay.   However, presence of multiple variants may affect accuracy.     04/06/2023 9.4 (H) 4.0 - 5.6 % Final     Comment:     ADA Screening Guidelines:  5.7-6.4%  Consistent with prediabetes  >or=6.5%  Consistent with diabetes    High levels of fetal hemoglobin interfere with the HbA1C  assay. Heterozygous hemoglobin variants (HbS, HgC, etc)do  not significantly interfere with this assay.   However, presence of multiple variants may affect accuracy.     11/10/2022 7.1 (H) 4.0 - 5.6 % Final     Comment:     ADA Screening Guidelines:  5.7-6.4%  Consistent with prediabetes  >or=6.5%  Consistent with  "diabetes    High levels of fetal hemoglobin interfere with the HbA1C  assay. Heterozygous hemoglobin variants (HbS, HgC, etc)do  not significantly interfere with this assay.   However, presence of multiple variants may affect accuracy.                 Objective:      Vitals:    07/02/24 0826   BP: 128/85   Pulse: 60   Resp: 18   Weight: 132 kg (291 lb 0.1 oz)   Height: 6' 2" (1.88 m)   PainSc: 0-No pain        Physical Exam  Constitutional:       General: He is not in acute distress.     Appearance: Normal appearance. He is well-developed and normal weight.   HENT:      Head: Normocephalic and atraumatic.   Cardiovascular:      Pulses:           Dorsalis pedis pulses are 2+ on the right side and 2+ on the left side.        Posterior tibial pulses are 1+ on the right side and 1+ on the left side.      Comments: CFT< 3 secs all toes bilateral foot, skin temp warm to cool bilateral foot, no hair growth bilateral lower extremity, no edema to bilateral lower extremities    Pulmonary:      Effort: Pulmonary effort is normal.   Musculoskeletal:         General: No swelling.      Right foot: Normal range of motion. Deformity present. No foot drop.      Left foot: Normal range of motion. Deformity present. No foot drop.      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     5/5 muscle strength with DF/PF/Inv/Ev bilateral.       Feet:      Right foot:      Protective Sensation: 5 sites tested.  0 sites sensed.      Skin integrity: Ulcer, skin breakdown, callus and dry skin present. No erythema.      Toenail Condition: Right toenails are abnormally thick.      Left foot:      Protective Sensation: 5 sites tested.  0 sites sensed.      Skin integrity: Callus and dry skin present. No ulcer, skin breakdown or erythema.      Toenail Condition: Left toenails are abnormally thick.   Skin:     General: Skin is warm and dry.      Capillary Refill: Capillary refill takes 2 to 3 seconds.      " Findings: No erythema.      Nails: There is no clubbing.      Comments: Ulceration location:  Bilateral ulcerations noted to be healed.   Neurological:      Mental Status: He is alert and oriented to person, place, and time.      Sensory: Sensory deficit present.      Motor: No weakness.      Deep Tendon Reflexes:      Reflex Scores:       Patellar reflexes are 2+ on the right side and 2+ on the left side.       Achilles reflexes are 2+ on the right side and 2+ on the left side.     Comments: Diminished/loss of protective sensation all toes bilateral to 10 gram monofilament.   Psychiatric:         Mood and Affect: Mood normal.         Behavior: Behavior normal.         Thought Content: Thought content normal.         Ulcerations appear healed bilateral.    Assessment:       Encounter Diagnoses   Name Primary?    Type II diabetes mellitus with neurological manifestations Yes    Foot ulcer, right, with fat layer exposed     Foot ulcer, left, with fat layer exposed          Plan:       Billy was seen today for wound care and dressing change.    Diagnoses and all orders for this visit:    Type II diabetes mellitus with neurological manifestations    Foot ulcer, right, with fat layer exposed    Foot ulcer, left, with fat layer exposed        Independent review of patients previous clinical notes    Reviewed current medication(s), and lab(s) specific to foot ailment(s) with patient in detail. All questions answered     The nature of the condition, options for management, as well as potential risks and complications were discussed in detail with patient. Patient was amenable to my recommendations and left my office fully informed and will follow up as instructed or sooner if necessary.      Will apply bilateral football dressings today to be safe.  We will continue to offload recently healed ulcerations.  Follow-up in 1 week for re-evaluation.  Wound care discussed in detail.  Follow-up in 1 week.

## 2024-07-14 NOTE — PROGRESS NOTES
Subjective:      Patient ID: Billy Rivera is a 59 y.o. male.    Chief Complaint:  Follow-up wound care, right foot.      Billy is a 59 y.o. male who presents to the clinic for evaluation and treatment of high risk feet. Billy has a past medical history of Allergy, CAD (coronary artery disease), native coronary artery (6/25/2013), Cancer, COVID-19 virus detected (9/1/2020), Diabetes mellitus, Diabetes mellitus, type 2, Disorder of kidney and ureter, Heart attack (04/2012), colon cancer, stage I, Hyperlipidemia, Hypertension, Muscular pain, NSTEMI May 2013 - peak troponin 0.22 (5/22/2013), MICHAELA (obstructive sleep apnea), and Retinopathy due to secondary diabetes.  Follow-up with no new issues or complaints today.          PCP: Augie Ruiz MD    Date Last Seen by PCP:   Chief Complaint   Patient presents with    Wound Care     Bilateral        History of Trauma: negative  Sign of Infection: none        Hemoglobin A1C   Date Value Ref Range Status   07/20/2023 8.7 (H) 4.0 - 5.6 % Final     Comment:     ADA Screening Guidelines:  5.7-6.4%  Consistent with prediabetes  >or=6.5%  Consistent with diabetes    High levels of fetal hemoglobin interfere with the HbA1C  assay. Heterozygous hemoglobin variants (HbS, HgC, etc)do  not significantly interfere with this assay.   However, presence of multiple variants may affect accuracy.     04/06/2023 9.4 (H) 4.0 - 5.6 % Final     Comment:     ADA Screening Guidelines:  5.7-6.4%  Consistent with prediabetes  >or=6.5%  Consistent with diabetes    High levels of fetal hemoglobin interfere with the HbA1C  assay. Heterozygous hemoglobin variants (HbS, HgC, etc)do  not significantly interfere with this assay.   However, presence of multiple variants may affect accuracy.     11/10/2022 7.1 (H) 4.0 - 5.6 % Final     Comment:     ADA Screening Guidelines:  5.7-6.4%  Consistent with prediabetes  >or=6.5%  Consistent with diabetes    High levels of fetal hemoglobin  "interfere with the HbA1C  assay. Heterozygous hemoglobin variants (HbS, HgC, etc)do  not significantly interfere with this assay.   However, presence of multiple variants may affect accuracy.                 Objective:      Vitals:    07/09/24 0952   BP: 135/78   Pulse: 68   Height: 6' 2" (1.88 m)   PainSc: 0-No pain        Physical Exam  Constitutional:       General: He is not in acute distress.     Appearance: Normal appearance. He is well-developed and normal weight.   HENT:      Head: Normocephalic and atraumatic.   Cardiovascular:      Pulses:           Dorsalis pedis pulses are 2+ on the right side and 2+ on the left side.        Posterior tibial pulses are 1+ on the right side and 1+ on the left side.      Comments: CFT< 3 secs all toes bilateral foot, skin temp warm to cool bilateral foot, no hair growth bilateral lower extremity, no edema to bilateral lower extremities    Pulmonary:      Effort: Pulmonary effort is normal.   Musculoskeletal:         General: No swelling.      Right foot: Normal range of motion. Deformity present. No foot drop.      Left foot: Normal range of motion. Deformity present. No foot drop.      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     5/5 muscle strength with DF/PF/Inv/Ev bilateral.       Feet:      Right foot:      Protective Sensation: 5 sites tested.  0 sites sensed.      Skin integrity: Ulcer, skin breakdown, callus and dry skin present. No erythema.      Toenail Condition: Right toenails are abnormally thick.      Left foot:      Protective Sensation: 5 sites tested.  0 sites sensed.      Skin integrity: Callus and dry skin present. No ulcer, skin breakdown or erythema.      Toenail Condition: Left toenails are abnormally thick.   Skin:     General: Skin is warm and dry.      Capillary Refill: Capillary refill takes 2 to 3 seconds.      Findings: No erythema.      Nails: There is no clubbing.      Comments: Ulceration " location:  Bilateral ulcerations noted to be healed.   Neurological:      Mental Status: He is alert and oriented to person, place, and time.      Sensory: Sensory deficit present.      Motor: No weakness.      Deep Tendon Reflexes:      Reflex Scores:       Patellar reflexes are 2+ on the right side and 2+ on the left side.       Achilles reflexes are 2+ on the right side and 2+ on the left side.     Comments: Diminished/loss of protective sensation all toes bilateral to 10 gram monofilament.   Psychiatric:         Mood and Affect: Mood normal.         Behavior: Behavior normal.         Thought Content: Thought content normal.         Ulcerations appear healed bilateral.    Assessment:       Encounter Diagnoses   Name Primary?    Type II diabetes mellitus with neurological manifestations Yes    Foot ulcer, right, with fat layer exposed     Foot ulcer, left, with fat layer exposed          Plan:       Billy was seen today for wound care.    Diagnoses and all orders for this visit:    Type II diabetes mellitus with neurological manifestations  -     DIABETIC SHOES FOR HOME USE    Foot ulcer, right, with fat layer exposed  -     DIABETIC SHOES FOR HOME USE    Foot ulcer, left, with fat layer exposed  -     DIABETIC SHOES FOR HOME USE        Independent review of patients previous clinical notes    Reviewed current medication(s), and lab(s) specific to foot ailment(s) with patient in detail. All questions answered     The nature of the condition, options for management, as well as potential risks and complications were discussed in detail with patient. Patient was amenable to my recommendations and left my office fully informed and will follow up as instructed or sooner if necessary.      Shoe inspection. Diabetic Foot Education. Patient reminded of the importance of good nutrition and blood sugar control to help prevent podiatric complications of diabetes. Patient instructed on proper foot hygeine. We discussed  wearing proper shoe gear, daily foot inspections and Diabetic foot education in detail.    Return to clinic in 1 month or sooner if problems arise

## 2024-07-22 ENCOUNTER — PATIENT MESSAGE (OUTPATIENT)
Dept: PODIATRY | Facility: CLINIC | Age: 59
End: 2024-07-22
Payer: COMMERCIAL

## 2024-07-24 ENCOUNTER — OFFICE VISIT (OUTPATIENT)
Dept: PODIATRY | Facility: CLINIC | Age: 59
End: 2024-07-24
Payer: COMMERCIAL

## 2024-07-24 VITALS
HEART RATE: 72 BPM | SYSTOLIC BLOOD PRESSURE: 129 MMHG | BODY MASS INDEX: 36.57 KG/M2 | WEIGHT: 285 LBS | DIASTOLIC BLOOD PRESSURE: 73 MMHG | HEIGHT: 74 IN

## 2024-07-24 DIAGNOSIS — L84 CORN OR CALLUS: ICD-10-CM

## 2024-07-24 DIAGNOSIS — E11.49 TYPE II DIABETES MELLITUS WITH NEUROLOGICAL MANIFESTATIONS: Primary | ICD-10-CM

## 2024-07-24 PROCEDURE — 3008F BODY MASS INDEX DOCD: CPT | Mod: CPTII,S$GLB,, | Performed by: PODIATRIST

## 2024-07-24 PROCEDURE — 1160F RVW MEDS BY RX/DR IN RCRD: CPT | Mod: CPTII,S$GLB,, | Performed by: PODIATRIST

## 2024-07-24 PROCEDURE — 1159F MED LIST DOCD IN RCRD: CPT | Mod: CPTII,S$GLB,, | Performed by: PODIATRIST

## 2024-07-24 PROCEDURE — 99213 OFFICE O/P EST LOW 20 MIN: CPT | Mod: S$GLB,,, | Performed by: PODIATRIST

## 2024-07-24 PROCEDURE — 3078F DIAST BP <80 MM HG: CPT | Mod: CPTII,S$GLB,, | Performed by: PODIATRIST

## 2024-07-24 PROCEDURE — 4010F ACE/ARB THERAPY RXD/TAKEN: CPT | Mod: CPTII,S$GLB,, | Performed by: PODIATRIST

## 2024-07-24 PROCEDURE — 99999 PR PBB SHADOW E&M-EST. PATIENT-LVL IV: CPT | Mod: PBBFAC,,, | Performed by: PODIATRIST

## 2024-07-24 PROCEDURE — 3074F SYST BP LT 130 MM HG: CPT | Mod: CPTII,S$GLB,, | Performed by: PODIATRIST

## 2024-07-24 RX ORDER — ASCORBIC ACID 250 MG
250 TABLET ORAL
COMMUNITY

## 2024-07-24 RX ORDER — ATORVASTATIN CALCIUM 80 MG/1
80 TABLET, FILM COATED ORAL
COMMUNITY

## 2024-07-24 RX ORDER — CARVEDILOL 12.5 MG/1
12.5 TABLET ORAL
COMMUNITY

## 2024-07-24 RX ORDER — SEMAGLUTIDE 0.68 MG/ML
0.5 INJECTION, SOLUTION SUBCUTANEOUS
COMMUNITY

## 2024-07-24 RX ORDER — INSULIN LISPRO 100 [IU]/ML
INJECTION, SOLUTION INTRAVENOUS; SUBCUTANEOUS
COMMUNITY

## 2024-07-24 NOTE — PROGRESS NOTES
Subjective:      Patient ID: Billy Rivera is a 59 y.o. male.    Chief Complaint:  Follow-up wound care, right foot.      Billy is a 59 y.o. male who presents to the clinic for evaluation and treatment of high risk feet. Billy has a past medical history of Allergy, CAD (coronary artery disease), native coronary artery (6/25/2013), Cancer, COVID-19 virus detected (9/1/2020), Diabetes mellitus, Diabetes mellitus, type 2, Disorder of kidney and ureter, Heart attack (04/2012), colon cancer, stage I, Hyperlipidemia, Hypertension, Muscular pain, NSTEMI May 2013 - peak troponin 0.22 (5/22/2013), MICHAELA (obstructive sleep apnea), and Retinopathy due to secondary diabetes.  Follow-up with no new issues or complaints today.  Doing well without bandaging feet.  In diabetic shoes.  No issues with irritation or reulceration.          PCP: Augie Ruiz MD    Date Last Seen by PCP:   Chief Complaint   Patient presents with    Wound Check     Bilateral    Diabetes Mellitus     PCP- 04/03/2024  Augie Ruiz MD       History of Trauma: negative  Sign of Infection: none        Hemoglobin A1C   Date Value Ref Range Status   07/20/2023 8.7 (H) 4.0 - 5.6 % Final     Comment:     ADA Screening Guidelines:  5.7-6.4%  Consistent with prediabetes  >or=6.5%  Consistent with diabetes    High levels of fetal hemoglobin interfere with the HbA1C  assay. Heterozygous hemoglobin variants (HbS, HgC, etc)do  not significantly interfere with this assay.   However, presence of multiple variants may affect accuracy.     04/06/2023 9.4 (H) 4.0 - 5.6 % Final     Comment:     ADA Screening Guidelines:  5.7-6.4%  Consistent with prediabetes  >or=6.5%  Consistent with diabetes    High levels of fetal hemoglobin interfere with the HbA1C  assay. Heterozygous hemoglobin variants (HbS, HgC, etc)do  not significantly interfere with this assay.   However, presence of multiple variants may affect accuracy.     11/10/2022 7.1 (H) 4.0 - 5.6 %  "Final     Comment:     ADA Screening Guidelines:  5.7-6.4%  Consistent with prediabetes  >or=6.5%  Consistent with diabetes    High levels of fetal hemoglobin interfere with the HbA1C  assay. Heterozygous hemoglobin variants (HbS, HgC, etc)do  not significantly interfere with this assay.   However, presence of multiple variants may affect accuracy.                 Objective:      Vitals:    07/24/24 0807   BP: 129/73   Pulse: 72   Weight: 129.3 kg (285 lb)   Height: 6' 2" (1.88 m)   PainSc: 0-No pain        Physical Exam  Constitutional:       General: He is not in acute distress.     Appearance: Normal appearance. He is well-developed and normal weight.   HENT:      Head: Normocephalic and atraumatic.   Cardiovascular:      Pulses:           Dorsalis pedis pulses are 2+ on the right side and 2+ on the left side.        Posterior tibial pulses are 1+ on the right side and 1+ on the left side.      Comments: CFT< 3 secs all toes bilateral foot, skin temp warm to cool bilateral foot, no hair growth bilateral lower extremity, no edema to bilateral lower extremities    Pulmonary:      Effort: Pulmonary effort is normal.   Musculoskeletal:         General: No swelling.      Right foot: Normal range of motion. Deformity present. No foot drop.      Left foot: Normal range of motion. Deformity present. No foot drop.      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     5/5 muscle strength with DF/PF/Inv/Ev bilateral.       Feet:      Right foot:      Protective Sensation: 5 sites tested.  0 sites sensed.      Skin integrity: Ulcer, skin breakdown, callus and dry skin present. No erythema.      Toenail Condition: Right toenails are abnormally thick.      Left foot:      Protective Sensation: 5 sites tested.  0 sites sensed.      Skin integrity: Callus and dry skin present. No ulcer, skin breakdown or erythema.      Toenail Condition: Left toenails are abnormally thick.   Skin:     " General: Skin is warm and dry.      Capillary Refill: Capillary refill takes 2 to 3 seconds.      Findings: No erythema.      Nails: There is no clubbing.      Comments: Ulceration location:  Bilateral ulcerations noted to be healed.   Neurological:      Mental Status: He is alert and oriented to person, place, and time.      Sensory: Sensory deficit present.      Motor: No weakness.      Deep Tendon Reflexes:      Reflex Scores:       Patellar reflexes are 2+ on the right side and 2+ on the left side.       Achilles reflexes are 2+ on the right side and 2+ on the left side.     Comments: Diminished/loss of protective sensation all toes bilateral to 10 gram monofilament.   Psychiatric:         Mood and Affect: Mood normal.         Behavior: Behavior normal.         Thought Content: Thought content normal.         Ulcerations appear healed bilateral.    Assessment:       Encounter Diagnoses   Name Primary?    Type II diabetes mellitus with neurological manifestations Yes    Corn or callus          Plan:       Billy was seen today for wound check and diabetes mellitus.    Diagnoses and all orders for this visit:    Type II diabetes mellitus with neurological manifestations    Corn or callus        Independent review of patients previous clinical notes    Reviewed current medication(s), and lab(s) specific to foot ailment(s) with patient in detail. All questions answered     The nature of the condition, options for management, as well as potential risks and complications were discussed in detail with patient. Patient was amenable to my recommendations and left my office fully informed and will follow up as instructed or sooner if necessary.      Shoe inspection. Diabetic Foot Education. Patient reminded of the importance of good nutrition and blood sugar control to help prevent podiatric complications of diabetes. Patient instructed on proper foot hygeine. We discussed wearing proper shoe gear, daily foot inspections  and Diabetic foot education in detail.    Return to clinic in 1 month or sooner if problems arise

## 2024-08-16 ENCOUNTER — TELEPHONE (OUTPATIENT)
Dept: PODIATRY | Facility: CLINIC | Age: 59
End: 2024-08-16
Payer: COMMERCIAL

## 2024-08-16 NOTE — TELEPHONE ENCOUNTER
Spoke with patient to confirm his appointment on 08/20/2024 with Dr. Melara(Podiatry), patient verbally confirmed appt.

## 2024-08-20 ENCOUNTER — OFFICE VISIT (OUTPATIENT)
Dept: PODIATRY | Facility: CLINIC | Age: 59
End: 2024-08-20
Payer: COMMERCIAL

## 2024-08-20 VITALS
HEART RATE: 70 BPM | BODY MASS INDEX: 36.58 KG/M2 | DIASTOLIC BLOOD PRESSURE: 87 MMHG | WEIGHT: 285.06 LBS | HEIGHT: 74 IN | SYSTOLIC BLOOD PRESSURE: 146 MMHG

## 2024-08-20 DIAGNOSIS — E11.49 TYPE II DIABETES MELLITUS WITH NEUROLOGICAL MANIFESTATIONS: Primary | ICD-10-CM

## 2024-08-20 DIAGNOSIS — L84 CORN OR CALLUS: ICD-10-CM

## 2024-08-20 PROCEDURE — 3079F DIAST BP 80-89 MM HG: CPT | Mod: CPTII,S$GLB,, | Performed by: PODIATRIST

## 2024-08-20 PROCEDURE — 99213 OFFICE O/P EST LOW 20 MIN: CPT | Mod: S$GLB,,, | Performed by: PODIATRIST

## 2024-08-20 PROCEDURE — 4010F ACE/ARB THERAPY RXD/TAKEN: CPT | Mod: CPTII,S$GLB,, | Performed by: PODIATRIST

## 2024-08-20 PROCEDURE — 1159F MED LIST DOCD IN RCRD: CPT | Mod: CPTII,S$GLB,, | Performed by: PODIATRIST

## 2024-08-20 PROCEDURE — 3077F SYST BP >= 140 MM HG: CPT | Mod: CPTII,S$GLB,, | Performed by: PODIATRIST

## 2024-08-20 PROCEDURE — 99999 PR PBB SHADOW E&M-EST. PATIENT-LVL IV: CPT | Mod: PBBFAC,,, | Performed by: PODIATRIST

## 2024-08-20 PROCEDURE — 3008F BODY MASS INDEX DOCD: CPT | Mod: CPTII,S$GLB,, | Performed by: PODIATRIST

## 2024-08-27 NOTE — PROGRESS NOTES
Subjective:      Patient ID: Billy Rivera is a 59 y.o. male.    Chief Complaint:  Follow-up wound care, right foot.      Billy is a 59 y.o. male who presents to the clinic for evaluation and treatment of high risk feet. Billy has a past medical history of Allergy, CAD (coronary artery disease), native coronary artery (6/25/2013), Cancer, COVID-19 virus detected (9/1/2020), Diabetes mellitus, Diabetes mellitus, type 2, Disorder of kidney and ureter, Heart attack (04/2012), colon cancer, stage I, Hyperlipidemia, Hypertension, Muscular pain, NSTEMI May 2013 - peak troponin 0.22 (5/22/2013), MICHAELA (obstructive sleep apnea), and Retinopathy due to secondary diabetes.  Follow-up with no new issues or complaints today.      PCP: Augie Ruiz MD    Date Last Seen by PCP:   Chief Complaint   Patient presents with    Wound Care       History of Trauma: negative  Sign of Infection: none        Hemoglobin A1C   Date Value Ref Range Status   07/20/2023 8.7 (H) 4.0 - 5.6 % Final     Comment:     ADA Screening Guidelines:  5.7-6.4%  Consistent with prediabetes  >or=6.5%  Consistent with diabetes    High levels of fetal hemoglobin interfere with the HbA1C  assay. Heterozygous hemoglobin variants (HbS, HgC, etc)do  not significantly interfere with this assay.   However, presence of multiple variants may affect accuracy.     04/06/2023 9.4 (H) 4.0 - 5.6 % Final     Comment:     ADA Screening Guidelines:  5.7-6.4%  Consistent with prediabetes  >or=6.5%  Consistent with diabetes    High levels of fetal hemoglobin interfere with the HbA1C  assay. Heterozygous hemoglobin variants (HbS, HgC, etc)do  not significantly interfere with this assay.   However, presence of multiple variants may affect accuracy.     11/10/2022 7.1 (H) 4.0 - 5.6 % Final     Comment:     ADA Screening Guidelines:  5.7-6.4%  Consistent with prediabetes  >or=6.5%  Consistent with diabetes    High levels of fetal hemoglobin interfere with the  "HbA1C  assay. Heterozygous hemoglobin variants (HbS, HgC, etc)do  not significantly interfere with this assay.   However, presence of multiple variants may affect accuracy.                 Objective:      Vitals:    08/20/24 0803   BP: (!) 146/87   Pulse: 70   Weight: 129.3 kg (285 lb 0.9 oz)   Height: 6' 2" (1.88 m)   PainSc: 0-No pain        Physical Exam  Constitutional:       General: He is not in acute distress.     Appearance: Normal appearance. He is well-developed and normal weight.   HENT:      Head: Normocephalic and atraumatic.   Cardiovascular:      Pulses:           Dorsalis pedis pulses are 2+ on the right side and 2+ on the left side.        Posterior tibial pulses are 1+ on the right side and 1+ on the left side.      Comments: CFT< 3 secs all toes bilateral foot, skin temp warm to cool bilateral foot, no hair growth bilateral lower extremity, no edema to bilateral lower extremities    Pulmonary:      Effort: Pulmonary effort is normal.   Musculoskeletal:         General: No swelling.      Right foot: Normal range of motion. Deformity present. No foot drop.      Left foot: Normal range of motion. Deformity present. No foot drop.      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     5/5 muscle strength with DF/PF/Inv/Ev bilateral.       Feet:      Right foot:      Protective Sensation: 5 sites tested.  0 sites sensed.      Skin integrity: Ulcer, skin breakdown, callus and dry skin present. No erythema.      Toenail Condition: Right toenails are abnormally thick.      Left foot:      Protective Sensation: 5 sites tested.  0 sites sensed.      Skin integrity: Callus and dry skin present. No ulcer, skin breakdown or erythema.      Toenail Condition: Left toenails are abnormally thick.   Skin:     General: Skin is warm and dry.      Capillary Refill: Capillary refill takes 2 to 3 seconds.      Findings: No erythema.      Nails: There is no clubbing.      " Comments: Ulceration location:  Bilateral ulcerations noted to be healed.   Neurological:      Mental Status: He is alert and oriented to person, place, and time.      Sensory: Sensory deficit present.      Motor: No weakness.      Deep Tendon Reflexes:      Reflex Scores:       Patellar reflexes are 2+ on the right side and 2+ on the left side.       Achilles reflexes are 2+ on the right side and 2+ on the left side.     Comments: Diminished/loss of protective sensation all toes bilateral to 10 gram monofilament.   Psychiatric:         Mood and Affect: Mood normal.         Behavior: Behavior normal.         Thought Content: Thought content normal.         Ulcerations appear healed bilateral.    Assessment:       Encounter Diagnoses   Name Primary?    Type II diabetes mellitus with neurological manifestations Yes    Corn or callus          Plan:       Billy was seen today for wound care.    Diagnoses and all orders for this visit:    Type II diabetes mellitus with neurological manifestations    Corn or callus        Independent review of patients previous clinical notes    Reviewed current medication(s), and lab(s) specific to foot ailment(s) with patient in detail. All questions answered     The nature of the condition, options for management, as well as potential risks and complications were discussed in detail with patient. Patient was amenable to my recommendations and left my office fully informed and will follow up as instructed or sooner if necessary.      Shoe inspection. Diabetic Foot Education. Patient reminded of the importance of good nutrition and blood sugar control to help prevent podiatric complications of diabetes. Patient instructed on proper foot hygeine. We discussed wearing proper shoe gear, daily foot inspections and Diabetic foot education in detail.    Return to clinic in 1 month or sooner if problems arise

## 2024-09-12 ENCOUNTER — OFFICE VISIT (OUTPATIENT)
Dept: PODIATRY | Facility: CLINIC | Age: 59
End: 2024-09-12
Payer: COMMERCIAL

## 2024-09-12 VITALS
HEART RATE: 74 BPM | HEIGHT: 74 IN | WEIGHT: 285.06 LBS | SYSTOLIC BLOOD PRESSURE: 139 MMHG | BODY MASS INDEX: 36.58 KG/M2 | DIASTOLIC BLOOD PRESSURE: 83 MMHG

## 2024-09-12 DIAGNOSIS — L97.512 FOOT ULCER, RIGHT, WITH FAT LAYER EXPOSED: Primary | ICD-10-CM

## 2024-09-12 PROCEDURE — 99999 PR PBB SHADOW E&M-EST. PATIENT-LVL IV: CPT | Mod: PBBFAC,,, | Performed by: PODIATRIST

## 2024-09-12 PROCEDURE — 87077 CULTURE AEROBIC IDENTIFY: CPT | Performed by: PODIATRIST

## 2024-09-12 PROCEDURE — 4010F ACE/ARB THERAPY RXD/TAKEN: CPT | Mod: CPTII,S$GLB,, | Performed by: PODIATRIST

## 2024-09-12 PROCEDURE — 3075F SYST BP GE 130 - 139MM HG: CPT | Mod: CPTII,S$GLB,, | Performed by: PODIATRIST

## 2024-09-12 PROCEDURE — 3079F DIAST BP 80-89 MM HG: CPT | Mod: CPTII,S$GLB,, | Performed by: PODIATRIST

## 2024-09-12 PROCEDURE — 87186 SC STD MICRODIL/AGAR DIL: CPT | Mod: 59 | Performed by: PODIATRIST

## 2024-09-12 PROCEDURE — 87150 DNA/RNA AMPLIFIED PROBE: CPT | Performed by: PODIATRIST

## 2024-09-12 PROCEDURE — 1159F MED LIST DOCD IN RCRD: CPT | Mod: CPTII,S$GLB,, | Performed by: PODIATRIST

## 2024-09-12 PROCEDURE — 3008F BODY MASS INDEX DOCD: CPT | Mod: CPTII,S$GLB,, | Performed by: PODIATRIST

## 2024-09-12 PROCEDURE — 1160F RVW MEDS BY RX/DR IN RCRD: CPT | Mod: CPTII,S$GLB,, | Performed by: PODIATRIST

## 2024-09-12 PROCEDURE — 87075 CULTR BACTERIA EXCEPT BLOOD: CPT | Performed by: PODIATRIST

## 2024-09-12 PROCEDURE — 87070 CULTURE OTHR SPECIMN AEROBIC: CPT | Performed by: PODIATRIST

## 2024-09-12 PROCEDURE — 99213 OFFICE O/P EST LOW 20 MIN: CPT | Mod: 25,S$GLB,, | Performed by: PODIATRIST

## 2024-09-12 RX ORDER — DOXYCYCLINE 100 MG/1
100 CAPSULE ORAL 2 TIMES DAILY
Qty: 20 CAPSULE | Refills: 0 | Status: SHIPPED | OUTPATIENT
Start: 2024-09-12

## 2024-09-13 ENCOUNTER — HOSPITAL ENCOUNTER (OUTPATIENT)
Dept: RADIOLOGY | Facility: HOSPITAL | Age: 59
Discharge: HOME | End: 2024-09-13
Attending: PODIATRIST
Payer: COMMERCIAL

## 2024-09-13 ENCOUNTER — PATIENT MESSAGE (OUTPATIENT)
Dept: PODIATRY | Facility: CLINIC | Age: 59
End: 2024-09-13
Payer: COMMERCIAL

## 2024-09-13 DIAGNOSIS — L97.512 FOOT ULCER, RIGHT, WITH FAT LAYER EXPOSED: ICD-10-CM

## 2024-09-13 DIAGNOSIS — M86.179 OTHER ACUTE OSTEOMYELITIS, UNSPECIFIED ANKLE AND FOOT: Primary | ICD-10-CM

## 2024-09-13 PROCEDURE — 73630 X-RAY EXAM OF FOOT: CPT | Mod: TC,RT

## 2024-09-13 PROCEDURE — 73630 X-RAY EXAM OF FOOT: CPT | Mod: 26,RT,, | Performed by: RADIOLOGY

## 2024-09-16 LAB
BACTERIA SPEC AEROBE CULT: ABNORMAL
BACTERIA SPEC ANAEROBE CULT: ABNORMAL

## 2024-09-18 ENCOUNTER — OFFICE VISIT (OUTPATIENT)
Dept: PODIATRY | Facility: CLINIC | Age: 59
End: 2024-09-18
Payer: COMMERCIAL

## 2024-09-18 ENCOUNTER — TELEPHONE (OUTPATIENT)
Dept: PODIATRY | Facility: CLINIC | Age: 59
End: 2024-09-18
Payer: COMMERCIAL

## 2024-09-18 VITALS
SYSTOLIC BLOOD PRESSURE: 161 MMHG | HEIGHT: 74 IN | HEART RATE: 60 BPM | BODY MASS INDEX: 36.6 KG/M2 | DIASTOLIC BLOOD PRESSURE: 87 MMHG

## 2024-09-18 DIAGNOSIS — L97.512 FOOT ULCER, RIGHT, WITH FAT LAYER EXPOSED: ICD-10-CM

## 2024-09-18 DIAGNOSIS — M86.179 OTHER ACUTE OSTEOMYELITIS, UNSPECIFIED ANKLE AND FOOT: Primary | ICD-10-CM

## 2024-09-18 PROCEDURE — 4010F ACE/ARB THERAPY RXD/TAKEN: CPT | Mod: CPTII,S$GLB,, | Performed by: PODIATRIST

## 2024-09-18 PROCEDURE — 11042 DBRDMT SUBQ TIS 1ST 20SQCM/<: CPT | Mod: S$GLB,,, | Performed by: PODIATRIST

## 2024-09-18 PROCEDURE — 3077F SYST BP >= 140 MM HG: CPT | Mod: CPTII,S$GLB,, | Performed by: PODIATRIST

## 2024-09-18 PROCEDURE — 99213 OFFICE O/P EST LOW 20 MIN: CPT | Mod: 25,S$GLB,, | Performed by: PODIATRIST

## 2024-09-18 PROCEDURE — 99999 PR PBB SHADOW E&M-EST. PATIENT-LVL IV: CPT | Mod: PBBFAC,,, | Performed by: PODIATRIST

## 2024-09-18 PROCEDURE — 3008F BODY MASS INDEX DOCD: CPT | Mod: CPTII,S$GLB,, | Performed by: PODIATRIST

## 2024-09-18 PROCEDURE — 3079F DIAST BP 80-89 MM HG: CPT | Mod: CPTII,S$GLB,, | Performed by: PODIATRIST

## 2024-09-18 RX ORDER — LEVOFLOXACIN 750 MG/1
750 TABLET ORAL DAILY
Qty: 10 TABLET | Refills: 0 | Status: SHIPPED | OUTPATIENT
Start: 2024-09-18

## 2024-09-21 NOTE — PROGRESS NOTES
Subjective:      Patient ID: Billy Rivera is a 59 y.o. male.    Chief Complaint:  Follow-up wound care, right foot.      Billy is a 59 y.o. male who presents to the clinic for evaluation and treatment of high risk feet. Billy has a past medical history of Allergy, CAD (coronary artery disease), native coronary artery (6/25/2013), Cancer, COVID-19 virus detected (9/1/2020), Diabetes mellitus, Diabetes mellitus, type 2, Disorder of kidney and ureter, Heart attack (04/2012), colon cancer, stage I, Hyperlipidemia, Hypertension, Muscular pain, NSTEMI May 2013 - peak troponin 0.22 (5/22/2013), MICHAELA (obstructive sleep apnea), and Retinopathy due to secondary diabetes.  New issue ulceration/blister right foot.  PCP: Augie Ruiz MD    Date Last Seen by PCP:   Chief Complaint   Patient presents with    Wound Check     Right foot (underneath)    Diabetes Mellitus       History of Trauma: negative  Sign of Infection: none        Hemoglobin A1C   Date Value Ref Range Status   07/20/2023 8.7 (H) 4.0 - 5.6 % Final     Comment:     ADA Screening Guidelines:  5.7-6.4%  Consistent with prediabetes  >or=6.5%  Consistent with diabetes    High levels of fetal hemoglobin interfere with the HbA1C  assay. Heterozygous hemoglobin variants (HbS, HgC, etc)do  not significantly interfere with this assay.   However, presence of multiple variants may affect accuracy.     04/06/2023 9.4 (H) 4.0 - 5.6 % Final     Comment:     ADA Screening Guidelines:  5.7-6.4%  Consistent with prediabetes  >or=6.5%  Consistent with diabetes    High levels of fetal hemoglobin interfere with the HbA1C  assay. Heterozygous hemoglobin variants (HbS, HgC, etc)do  not significantly interfere with this assay.   However, presence of multiple variants may affect accuracy.     11/10/2022 7.1 (H) 4.0 - 5.6 % Final     Comment:     ADA Screening Guidelines:  5.7-6.4%  Consistent with prediabetes  >or=6.5%  Consistent with diabetes    High levels of  "fetal hemoglobin interfere with the HbA1C  assay. Heterozygous hemoglobin variants (HbS, HgC, etc)do  not significantly interfere with this assay.   However, presence of multiple variants may affect accuracy.                 Objective:      Vitals:    09/12/24 1028   BP: 139/83   Pulse: 74   Weight: 129.3 kg (285 lb 0.9 oz)   Height: 6' 2" (1.88 m)   PainSc: 0-No pain        Physical Exam  Constitutional:       General: He is not in acute distress.     Appearance: Normal appearance. He is well-developed and normal weight.   HENT:      Head: Normocephalic and atraumatic.   Cardiovascular:      Pulses:           Dorsalis pedis pulses are 2+ on the right side and 2+ on the left side.        Posterior tibial pulses are 1+ on the right side and 1+ on the left side.      Comments: CFT< 3 secs all toes bilateral foot, skin temp warm to cool bilateral foot, no hair growth bilateral lower extremity, no edema to bilateral lower extremities    Pulmonary:      Effort: Pulmonary effort is normal.   Musculoskeletal:         General: No swelling.      Right foot: Normal range of motion. Deformity present. No foot drop.      Left foot: Normal range of motion. Deformity present. No foot drop.      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     5/5 muscle strength with DF/PF/Inv/Ev bilateral.       Feet:      Right foot:      Protective Sensation: 5 sites tested.  0 sites sensed.      Skin integrity: Ulcer, skin breakdown, callus and dry skin present. No erythema.      Toenail Condition: Right toenails are abnormally thick.      Left foot:      Protective Sensation: 5 sites tested.  0 sites sensed.      Skin integrity: Callus and dry skin present. No ulcer, skin breakdown or erythema.      Toenail Condition: Left toenails are abnormally thick.   Skin:     General: Skin is warm and dry.      Capillary Refill: Capillary refill takes 2 to 3 seconds.      Findings: No erythema.      Nails: " "There is no clubbing.      Comments: Ulceration location:  Bilateral ulcerations noted to be healed.   Neurological:      Mental Status: He is alert and oriented to person, place, and time.      Sensory: Sensory deficit present.      Motor: No weakness.      Deep Tendon Reflexes:      Reflex Scores:       Patellar reflexes are 2+ on the right side and 2+ on the left side.       Achilles reflexes are 2+ on the right side and 2+ on the left side.     Comments: Diminished/loss of protective sensation all toes bilateral to 10 gram monofilament.   Psychiatric:         Mood and Affect: Mood normal.         Behavior: Behavior normal.         Thought Content: Thought content normal.       Wound Details:    Location:  Right foot     Type of Debridement:  Excisional       Length (cm):  0.8 cm       Width (cm):  0.5 cm       Depth (cm):  0.1 cm  Assessment:       Encounter Diagnosis   Name Primary?    Foot ulcer, right, with fat layer exposed Yes         Plan:       Billy was seen today for wound check and diabetes mellitus.    Diagnoses and all orders for this visit:    Foot ulcer, right, with fat layer exposed  -     CULTURE, AEROBIC  (SPECIFY SOURCE)  -     Culture, Anaerobic  -     X-Ray Foot Complete Right; Future    Other orders  -     doxycycline (VIBRAMYCIN) 100 MG Cap; Take 1 capsule (100 mg total) by mouth 2 (two) times daily.        Independent review of patients previous clinical notes    Reviewed current medication(s), and lab(s) specific to foot ailment(s) with patient in detail. All questions answered     The nature of the condition, options for management, as well as potential risks and complications were discussed in detail with patient. Patient was amenable to my recommendations and left my office fully informed and will follow up as instructed or sooner if necessary.      Wound Debridement    Performed by: Mingo Melara DPM   Authorized by: Patient    Time out: Immediately prior to procedure a "time " "out" was called to verify the correct patient, procedure, equipment, support staff and site/side marked as required.   Consent Done?:  Yes (Verbal)  Local anesthesia used?: No       Wound Details:    Location:  Right foot     Type of Debridement:  Excisional       Length (cm):  0.8 cm       Width (cm):  0.5 cm       Depth (cm):  0.1 cm       Percent Debrided (%):  100             Depth of debridement:  Subcutaneous tissue    Tissue debrided:  Dermis, Epidermis and Subcutaneous    Devitalized tissue debrided:  Biofilm, Callus and Necrotic/Eschar    Instruments:  Blade, Curette and Nippers     Bleeding:  Minimal  Hemostasis Achieved: Yes    Method Used:  Pressure  Patient tolerance:  Patient tolerated the procedure well with no immediate complications    Shoe inspection. Diabetic Foot Education. Patient reminded of the importance of good nutrition and blood sugar control to help prevent podiatric complications of diabetes. Patient instructed on proper foot hygeine. We discussed wearing proper shoe gear, daily foot inspections and Diabetic foot education in detail.    Return to clinic in 1 month or sooner if problems arise                               "

## 2024-09-25 ENCOUNTER — OFFICE VISIT (OUTPATIENT)
Dept: PODIATRY | Facility: CLINIC | Age: 59
End: 2024-09-25
Payer: COMMERCIAL

## 2024-09-25 VITALS
DIASTOLIC BLOOD PRESSURE: 64 MMHG | WEIGHT: 284.38 LBS | HEIGHT: 74 IN | HEART RATE: 80 BPM | SYSTOLIC BLOOD PRESSURE: 158 MMHG | BODY MASS INDEX: 36.5 KG/M2

## 2024-09-25 DIAGNOSIS — M86.179 OTHER ACUTE OSTEOMYELITIS, UNSPECIFIED ANKLE AND FOOT: Primary | ICD-10-CM

## 2024-09-25 DIAGNOSIS — L97.512 FOOT ULCER, RIGHT, WITH FAT LAYER EXPOSED: ICD-10-CM

## 2024-09-25 DIAGNOSIS — E11.49 TYPE II DIABETES MELLITUS WITH NEUROLOGICAL MANIFESTATIONS: ICD-10-CM

## 2024-09-25 PROCEDURE — 3078F DIAST BP <80 MM HG: CPT | Mod: CPTII,S$GLB,, | Performed by: PODIATRIST

## 2024-09-25 PROCEDURE — 4010F ACE/ARB THERAPY RXD/TAKEN: CPT | Mod: CPTII,S$GLB,, | Performed by: PODIATRIST

## 2024-09-25 PROCEDURE — 3077F SYST BP >= 140 MM HG: CPT | Mod: CPTII,S$GLB,, | Performed by: PODIATRIST

## 2024-09-25 PROCEDURE — 11043 DBRDMT MUSC&/FSCA 1ST 20/<: CPT | Mod: S$GLB,,, | Performed by: PODIATRIST

## 2024-09-25 PROCEDURE — 3008F BODY MASS INDEX DOCD: CPT | Mod: CPTII,S$GLB,, | Performed by: PODIATRIST

## 2024-09-25 PROCEDURE — 99213 OFFICE O/P EST LOW 20 MIN: CPT | Mod: 25,S$GLB,, | Performed by: PODIATRIST

## 2024-09-25 PROCEDURE — 1159F MED LIST DOCD IN RCRD: CPT | Mod: CPTII,S$GLB,, | Performed by: PODIATRIST

## 2024-09-25 PROCEDURE — 99999 PR PBB SHADOW E&M-EST. PATIENT-LVL IV: CPT | Mod: PBBFAC,,, | Performed by: PODIATRIST

## 2024-09-26 ENCOUNTER — TELEPHONE (OUTPATIENT)
Dept: PHARMACY | Facility: CLINIC | Age: 59
End: 2024-09-26
Payer: COMMERCIAL

## 2024-09-26 NOTE — TELEPHONE ENCOUNTER
[facility]/Population Health Chart Review & Patient Outreach Details For Medication Adherence Project    Reason for Outreach Encounter: 3rd Party payor non-compliance report ([employer], [employer], C, etc)  2.  Patient Outreach Method: Reviewed patient chart  and [employer] message  3.   Medication in question:   Hypertension Medications               amLODIPine (NORVASC) 10 MG tablet Take 1 tablet (10 mg total) by mouth once daily.    carvediloL (COREG) 12.5 MG tablet Take 0.5 tablets (6.25 mg total) by mouth 2 (two) times daily with meals.    carvediloL (COREG) 12.5 MG tablet Take 12.5 mg by mouth.    lisinopriL (PRINIVIL,ZESTRIL) 40 MG tablet Take 1 tablet (40 mg total) by mouth once daily.               LAST FILLED: 5/21/24 for 90 day supply  4.  Reviewed and or Updates Made To: Patient Chart  5. Outreach Outcomes and/or actions taken: Sent inquiry to patient: Waiting for response  Additional Notes:

## 2024-09-30 NOTE — PROGRESS NOTES
Subjective:      Patient ID: Billy Rivera is a 59 y.o. male.    Chief Complaint:  Follow-up wound care, right foot.      Billy is a 59 y.o. male who presents to the clinic for evaluation and treatment of high risk feet. Billy has a past medical history of Allergy, CAD (coronary artery disease), native coronary artery (6/25/2013), Cancer, COVID-19 virus detected (9/1/2020), Diabetes mellitus, Diabetes mellitus, type 2, Disorder of kidney and ureter, Heart attack (04/2012), colon cancer, stage I, Hyperlipidemia, Hypertension, Muscular pain, NSTEMI May 2013 - peak troponin 0.22 (5/22/2013), MICHAELA (obstructive sleep apnea), and Retinopathy due to secondary diabetes.  New issue ulceration/blister right foot.  Improving, tolerating antibiotics well.  Improving.  PCP: Augie Ruiz MD    Date Last Seen by PCP:   Chief Complaint   Patient presents with    Wound Care     Right foot        History of Trauma: negative  Sign of Infection: none        Hemoglobin A1C   Date Value Ref Range Status   07/20/2023 8.7 (H) 4.0 - 5.6 % Final     Comment:     ADA Screening Guidelines:  5.7-6.4%  Consistent with prediabetes  >or=6.5%  Consistent with diabetes    High levels of fetal hemoglobin interfere with the HbA1C  assay. Heterozygous hemoglobin variants (HbS, HgC, etc)do  not significantly interfere with this assay.   However, presence of multiple variants may affect accuracy.     04/06/2023 9.4 (H) 4.0 - 5.6 % Final     Comment:     ADA Screening Guidelines:  5.7-6.4%  Consistent with prediabetes  >or=6.5%  Consistent with diabetes    High levels of fetal hemoglobin interfere with the HbA1C  assay. Heterozygous hemoglobin variants (HbS, HgC, etc)do  not significantly interfere with this assay.   However, presence of multiple variants may affect accuracy.     11/10/2022 7.1 (H) 4.0 - 5.6 % Final     Comment:     ADA Screening Guidelines:  5.7-6.4%  Consistent with prediabetes  >or=6.5%  Consistent with  "diabetes    High levels of fetal hemoglobin interfere with the HbA1C  assay. Heterozygous hemoglobin variants (HbS, HgC, etc)do  not significantly interfere with this assay.   However, presence of multiple variants may affect accuracy.                 Objective:      Vitals:    09/25/24 0811   BP: (!) 158/64   Pulse: 80   Weight: 129 kg (284 lb 6.3 oz)   Height: 6' 2" (1.88 m)   PainSc: 0-No pain        Physical Exam  Constitutional:       General: He is not in acute distress.     Appearance: Normal appearance. He is well-developed and normal weight.   HENT:      Head: Normocephalic and atraumatic.   Cardiovascular:      Pulses:           Dorsalis pedis pulses are 2+ on the right side and 2+ on the left side.        Posterior tibial pulses are 1+ on the right side and 1+ on the left side.      Comments: CFT< 3 secs all toes bilateral foot, skin temp warm to cool bilateral foot, no hair growth bilateral lower extremity, no edema to bilateral lower extremities    Pulmonary:      Effort: Pulmonary effort is normal.   Musculoskeletal:         General: No swelling.      Right foot: Normal range of motion. Deformity present. No foot drop.      Left foot: Normal range of motion. Deformity present. No foot drop.      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     5/5 muscle strength with DF/PF/Inv/Ev bilateral.       Feet:      Right foot:      Protective Sensation: 5 sites tested.  0 sites sensed.      Skin integrity: Ulcer, skin breakdown, callus and dry skin present. No erythema.      Toenail Condition: Right toenails are abnormally thick.      Left foot:      Protective Sensation: 5 sites tested.  0 sites sensed.      Skin integrity: Callus and dry skin present. No ulcer, skin breakdown or erythema.      Toenail Condition: Left toenails are abnormally thick.   Skin:     General: Skin is warm and dry.      Capillary Refill: Capillary refill takes 2 to 3 seconds.      " Findings: No erythema.      Nails: There is no clubbing.      Comments: Ulceration location:  Bilateral ulcerations noted to be healed.   Neurological:      Mental Status: He is alert and oriented to person, place, and time.      Sensory: Sensory deficit present.      Motor: No weakness.      Deep Tendon Reflexes:      Reflex Scores:       Patellar reflexes are 2+ on the right side and 2+ on the left side.       Achilles reflexes are 2+ on the right side and 2+ on the left side.     Comments: Diminished/loss of protective sensation all toes bilateral to 10 gram monofilament.   Psychiatric:         Mood and Affect: Mood normal.         Behavior: Behavior normal.         Thought Content: Thought content normal.       Wound Details:    Location:  Right foot     Type of Debridement:  Excisional       Length (cm):  0.3 cm       Width (cm):  0.2 cm       Depth (cm):  0.1 cm  Assessment:       Encounter Diagnoses   Name Primary?    Other acute osteomyelitis, unspecified ankle and foot Yes    Foot ulcer, right, with fat layer exposed     Type II diabetes mellitus with neurological manifestations          Plan:       Billy was seen today for wound care.    Diagnoses and all orders for this visit:    Other acute osteomyelitis, unspecified ankle and foot    Foot ulcer, right, with fat layer exposed    Type II diabetes mellitus with neurological manifestations        Independent review of patients previous clinical notes    Reviewed current medication(s), and lab(s) specific to foot ailment(s) with patient in detail. All questions answered     The nature of the condition, options for management, as well as potential risks and complications were discussed in detail with patient. Patient was amenable to my recommendations and left my office fully informed and will follow up as instructed or sooner if necessary.      Wound Debridement    Performed by: Mingo Melara DPM   Authorized by: Patient    Time out: Immediately prior  "to procedure a "time out" was called to verify the correct patient, procedure, equipment, support staff and site/side marked as required.   Consent Done?:  Yes (Verbal)  Local anesthesia used?: No       Wound Details:    Location:  Right foot     Type of Debridement:  Excisional       Length (cm):  0.3 cm       Width (cm):  0.5 cm       Depth (cm):  0.1 cm       Percent Debrided (%):  100             Depth of debridement:  Subcutaneous tissue    Tissue debrided:  Dermis, Epidermis and Subcutaneous    Devitalized tissue debrided:  Biofilm, Callus and Necrotic/Eschar    Instruments:  Blade, Curette and Nippers     Bleeding:  Minimal  Hemostasis Achieved: Yes    Method Used:  Pressure  Patient tolerance:  Patient tolerated the procedure well with no immediate complications    Shoe inspection. Diabetic Foot Education. Patient reminded of the importance of good nutrition and blood sugar control to help prevent podiatric complications of diabetes. Patient instructed on proper foot hygeine. We discussed wearing proper shoe gear, daily foot inspections and Diabetic foot education in detail.    Return to clinic in 1 month or sooner if problems arise                                   "

## 2024-10-02 ENCOUNTER — OFFICE VISIT (OUTPATIENT)
Dept: PODIATRY | Facility: CLINIC | Age: 59
End: 2024-10-02
Payer: COMMERCIAL

## 2024-10-02 VITALS
BODY MASS INDEX: 36.78 KG/M2 | HEIGHT: 74 IN | SYSTOLIC BLOOD PRESSURE: 168 MMHG | HEART RATE: 89 BPM | WEIGHT: 286.63 LBS | DIASTOLIC BLOOD PRESSURE: 94 MMHG

## 2024-10-02 DIAGNOSIS — E11.49 TYPE II DIABETES MELLITUS WITH NEUROLOGICAL MANIFESTATIONS: ICD-10-CM

## 2024-10-02 DIAGNOSIS — L97.512 FOOT ULCER, RIGHT, WITH FAT LAYER EXPOSED: Primary | ICD-10-CM

## 2024-10-02 PROCEDURE — 99213 OFFICE O/P EST LOW 20 MIN: CPT | Mod: 25,S$GLB,, | Performed by: PODIATRIST

## 2024-10-02 PROCEDURE — 3077F SYST BP >= 140 MM HG: CPT | Mod: CPTII,S$GLB,, | Performed by: PODIATRIST

## 2024-10-02 PROCEDURE — 3008F BODY MASS INDEX DOCD: CPT | Mod: CPTII,S$GLB,, | Performed by: PODIATRIST

## 2024-10-02 PROCEDURE — 99999 PR PBB SHADOW E&M-EST. PATIENT-LVL V: CPT | Mod: PBBFAC,,, | Performed by: PODIATRIST

## 2024-10-02 PROCEDURE — 4010F ACE/ARB THERAPY RXD/TAKEN: CPT | Mod: CPTII,S$GLB,, | Performed by: PODIATRIST

## 2024-10-02 PROCEDURE — 3080F DIAST BP >= 90 MM HG: CPT | Mod: CPTII,S$GLB,, | Performed by: PODIATRIST

## 2024-10-02 PROCEDURE — 11042 DBRDMT SUBQ TIS 1ST 20SQCM/<: CPT | Mod: RT,S$GLB,, | Performed by: PODIATRIST

## 2024-10-03 ENCOUNTER — PATIENT MESSAGE (OUTPATIENT)
Dept: PODIATRY | Facility: CLINIC | Age: 59
End: 2024-10-03
Payer: COMMERCIAL

## 2024-10-04 ENCOUNTER — PATIENT MESSAGE (OUTPATIENT)
Dept: ADMINISTRATIVE | Facility: HOSPITAL | Age: 59
End: 2024-10-04
Payer: COMMERCIAL

## 2024-10-09 ENCOUNTER — OFFICE VISIT (OUTPATIENT)
Dept: PODIATRY | Facility: CLINIC | Age: 59
End: 2024-10-09
Payer: COMMERCIAL

## 2024-10-09 VITALS
WEIGHT: 286.63 LBS | DIASTOLIC BLOOD PRESSURE: 93 MMHG | HEART RATE: 77 BPM | RESPIRATION RATE: 18 BRPM | HEIGHT: 74 IN | SYSTOLIC BLOOD PRESSURE: 167 MMHG | BODY MASS INDEX: 36.78 KG/M2 | TEMPERATURE: 98 F

## 2024-10-09 DIAGNOSIS — E11.49 TYPE II DIABETES MELLITUS WITH NEUROLOGICAL MANIFESTATIONS: Primary | ICD-10-CM

## 2024-10-09 DIAGNOSIS — L97.512 FOOT ULCER, RIGHT, WITH FAT LAYER EXPOSED: ICD-10-CM

## 2024-10-09 PROCEDURE — 99999 PR PBB SHADOW E&M-EST. PATIENT-LVL V: CPT | Mod: PBBFAC,,, | Performed by: PODIATRIST

## 2024-10-09 PROCEDURE — 11042 DBRDMT SUBQ TIS 1ST 20SQCM/<: CPT | Mod: S$GLB,,, | Performed by: PODIATRIST

## 2024-10-09 PROCEDURE — 99499 UNLISTED E&M SERVICE: CPT | Mod: S$GLB,,, | Performed by: PODIATRIST

## 2024-10-09 NOTE — PROGRESS NOTES
Subjective:      Patient ID: Billy Rivera is a 59 y.o. male.    Chief Complaint:  Follow-up wound care, right foot.      Billy is a 59 y.o. male who presents to the clinic for evaluation and treatment of high risk feet. Billy has a past medical history of Allergy, CAD (coronary artery disease), native coronary artery (6/25/2013), Cancer, COVID-19 virus detected (9/1/2020), Diabetes mellitus, Diabetes mellitus, type 2, Disorder of kidney and ureter, Heart attack (04/2012), colon cancer, stage I, Hyperlipidemia, Hypertension, Muscular pain, NSTEMI May 2013 - peak troponin 0.22 (5/22/2013), MICHAELA (obstructive sleep apnea), and Retinopathy due to secondary diabetes.  Patient has been in football dressing, ambulating in Darco shoe x 1 week with out issues R    PCP: Augie Ruiz MD    Date Last Seen by PCP:   Chief Complaint   Patient presents with    Wound Care     Rt foot     Dressing Change     Football        Hemoglobin A1C   Date Value Ref Range Status   07/20/2023 8.7 (H) 4.0 - 5.6 % Final     Comment:     ADA Screening Guidelines:  5.7-6.4%  Consistent with prediabetes  >or=6.5%  Consistent with diabetes    High levels of fetal hemoglobin interfere with the HbA1C  assay. Heterozygous hemoglobin variants (HbS, HgC, etc)do  not significantly interfere with this assay.   However, presence of multiple variants may affect accuracy.     04/06/2023 9.4 (H) 4.0 - 5.6 % Final     Comment:     ADA Screening Guidelines:  5.7-6.4%  Consistent with prediabetes  >or=6.5%  Consistent with diabetes    High levels of fetal hemoglobin interfere with the HbA1C  assay. Heterozygous hemoglobin variants (HbS, HgC, etc)do  not significantly interfere with this assay.   However, presence of multiple variants may affect accuracy.     11/10/2022 7.1 (H) 4.0 - 5.6 % Final     Comment:     ADA Screening Guidelines:  5.7-6.4%  Consistent with prediabetes  >or=6.5%  Consistent with diabetes    High levels of fetal  "hemoglobin interfere with the HbA1C  assay. Heterozygous hemoglobin variants (HbS, HgC, etc)do  not significantly interfere with this assay.   However, presence of multiple variants may affect accuracy.           Objective:      Vitals:    10/09/24 1018   BP: (!) 167/93   Pulse: 77   Resp: 18   Temp: 98 °F (36.7 °C)   TempSrc: Oral   Weight: 130 kg (286 lb 9.6 oz)   Height: 6' 2" (1.88 m)   PainSc: 0-No pain        Physical Exam  Vitals reviewed.   Constitutional:       General: He is not in acute distress.     Appearance: Normal appearance. He is well-developed and overweight. He is not ill-appearing or diaphoretic.   HENT:      Head: Normocephalic and atraumatic.   Cardiovascular:      Pulses:           Dorsalis pedis pulses are 1+ on the right side and 1+ on the left side.        Posterior tibial pulses are 1+ on the right side and 1+ on the left side.      Comments: CFT< 3 secs all toes bilateral foot, skin temp warm to cool bilateral foot, no hair growth bilateral lower extremity, no edema to bilateral lower extremities    Pulmonary:      Effort: Pulmonary effort is normal.   Musculoskeletal:         General: Deformity present. No swelling or signs of injury.      Right foot: Normal range of motion. Deformity present. No foot drop.      Left foot: Normal range of motion. Deformity present. No foot drop.      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     5/5 muscle strength with DF/PF/Inv/Ev bilateral.       Feet:      Right foot:      Protective Sensation: 5 sites tested.  0 sites sensed.      Skin integrity: Ulcer, skin breakdown, callus and dry skin present. No erythema.      Toenail Condition: Right toenails are abnormally thick.      Left foot:      Protective Sensation: 5 sites tested.  0 sites sensed.      Skin integrity: Callus and dry skin present. No ulcer, skin breakdown or erythema.      Toenail Condition: Left toenails are abnormally thick.   Skin:     " General: Skin is warm and dry.      Capillary Refill: Capillary refill takes 2 to 3 seconds.      Coloration: Skin is not cyanotic.      Findings: Lesion present. No burn or erythema.      Nails: There is no clubbing.   Neurological:      Mental Status: He is alert and oriented to person, place, and time.      Sensory: Sensory deficit present.      Motor: No weakness.      Gait: Gait abnormal.      Deep Tendon Reflexes:      Reflex Scores:       Patellar reflexes are 2+ on the right side and 2+ on the left side.       Achilles reflexes are 2+ on the right side and 2+ on the left side.     Comments: Diminished/loss of protective sensation all toes bilateral to 10 gram monofilament.   Psychiatric:         Mood and Affect: Mood normal.         Behavior: Behavior normal.         Thought Content: Thought content normal.       10.9.24:     0.4x0.1x0.1 cm sub 2nd MPJ R . No surrounding erythema, edema, malodor, nor drainage noted           Assessment:       Encounter Diagnoses   Name Primary?    Type II diabetes mellitus with neurological manifestations Yes    Foot ulcer, right, with fat layer exposed            Plan:       Billy was seen today for wound care and dressing change.    Diagnoses and all orders for this visit:    Type II diabetes mellitus with neurological manifestations    Foot ulcer, right, with fat layer exposed      Independent review of patients previous clinical notes    Reviewed current medication(s), and lab(s) specific to foot ailment(s) with patient in detail. All questions answered     Debridement: With verbal consent, nonviable tissues on the bilateral foot were debrided beyond sub q utilizing a  sterile No. 3 scalpel and forceps. Minimal bleeding controlled with direct pressure  The patient tolerated this well.     Dressings: Hydrofera Blue ready  Offloading:Ibis TEJEDA MA assisted w/ application of football dressing under my direct supervision. Darco shoe applied to offload the area.  Advised patient that this should be worn on the affected foot at all times when ambulating     Follow-up:Patient is to return to the clinic in 1 week  for follow-up but should call [facility] immediately if any signs of infection, such as fever, chills, sweats, increased redness or pain.    Short-term goals include maintaining good offloading and minimizing bioburden, promoting granulation and epithelialization to healing.  Long-term goals include keeping the wound healed by good offloading and medical management under the direction of internist.

## 2024-10-13 NOTE — PROGRESS NOTES
Subjective:      Patient ID: Billy Rivera is a 59 y.o. male.    Chief Complaint:  Follow-up wound care, right foot.      Billy is a 59 y.o. male who presents to the clinic for evaluation and treatment of high risk feet. Billy has a past medical history of Allergy, CAD (coronary artery disease), native coronary artery (6/25/2013), Cancer, COVID-19 virus detected (9/1/2020), Diabetes mellitus, Diabetes mellitus, type 2, Disorder of kidney and ureter, Heart attack (04/2012), colon cancer, stage I, Hyperlipidemia, Hypertension, Muscular pain, NSTEMI May 2013 - peak troponin 0.22 (5/22/2013), MICHAELA (obstructive sleep apnea), and Retinopathy due to secondary diabetes.  New issue ulceration/blister right foot. Improving.  PCP: Augie Ruiz MD    Date Last Seen by PCP:   Chief Complaint   Patient presents with    Wound Care     Right foot        History of Trauma: negative  Sign of Infection: none        Hemoglobin A1C   Date Value Ref Range Status   07/20/2023 8.7 (H) 4.0 - 5.6 % Final     Comment:     ADA Screening Guidelines:  5.7-6.4%  Consistent with prediabetes  >or=6.5%  Consistent with diabetes    High levels of fetal hemoglobin interfere with the HbA1C  assay. Heterozygous hemoglobin variants (HbS, HgC, etc)do  not significantly interfere with this assay.   However, presence of multiple variants may affect accuracy.     04/06/2023 9.4 (H) 4.0 - 5.6 % Final     Comment:     ADA Screening Guidelines:  5.7-6.4%  Consistent with prediabetes  >or=6.5%  Consistent with diabetes    High levels of fetal hemoglobin interfere with the HbA1C  assay. Heterozygous hemoglobin variants (HbS, HgC, etc)do  not significantly interfere with this assay.   However, presence of multiple variants may affect accuracy.     11/10/2022 7.1 (H) 4.0 - 5.6 % Final     Comment:     ADA Screening Guidelines:  5.7-6.4%  Consistent with prediabetes  >or=6.5%  Consistent with diabetes    High levels of fetal hemoglobin interfere  "with the HbA1C  assay. Heterozygous hemoglobin variants (HbS, HgC, etc)do  not significantly interfere with this assay.   However, presence of multiple variants may affect accuracy.                 Objective:      Vitals:    10/02/24 0814   BP: (!) 168/94   Pulse: 89   Weight: 130 kg (286 lb 9.6 oz)   Height: 6' 2" (1.88 m)   PainSc: 0-No pain        Physical Exam  Constitutional:       General: He is not in acute distress.     Appearance: Normal appearance. He is well-developed and normal weight.   HENT:      Head: Normocephalic and atraumatic.   Cardiovascular:      Pulses:           Dorsalis pedis pulses are 2+ on the right side and 2+ on the left side.        Posterior tibial pulses are 1+ on the right side and 1+ on the left side.      Comments: CFT< 3 secs all toes bilateral foot, skin temp warm to cool bilateral foot, no hair growth bilateral lower extremity, no edema to bilateral lower extremities    Pulmonary:      Effort: Pulmonary effort is normal.   Musculoskeletal:         General: No swelling.      Right foot: Normal range of motion. Deformity present. No foot drop.      Left foot: Normal range of motion. Deformity present. No foot drop.      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     5/5 muscle strength with DF/PF/Inv/Ev bilateral.       Feet:      Right foot:      Protective Sensation: 5 sites tested.  0 sites sensed.      Skin integrity: Ulcer, skin breakdown, callus and dry skin present. No erythema.      Toenail Condition: Right toenails are abnormally thick.      Left foot:      Protective Sensation: 5 sites tested.  0 sites sensed.      Skin integrity: Callus and dry skin present. No ulcer, skin breakdown or erythema.      Toenail Condition: Left toenails are abnormally thick.   Skin:     General: Skin is warm and dry.      Capillary Refill: Capillary refill takes 2 to 3 seconds.      Findings: No erythema.      Nails: There is no clubbing.      " "Comments: Ulceration location:  Bilateral ulcerations noted to be healed.   Neurological:      Mental Status: He is alert and oriented to person, place, and time.      Sensory: Sensory deficit present.      Motor: No weakness.      Deep Tendon Reflexes:      Reflex Scores:       Patellar reflexes are 2+ on the right side and 2+ on the left side.       Achilles reflexes are 2+ on the right side and 2+ on the left side.     Comments: Diminished/loss of protective sensation all toes bilateral to 10 gram monofilament.   Psychiatric:         Mood and Affect: Mood normal.         Behavior: Behavior normal.         Thought Content: Thought content normal.       Wound Details:    Location:  Right foot     Type of Debridement:  Excisional       Length (cm):  0.2 cm       Width (cm):  0.2 cm       Depth (cm):  0.1 cm  Assessment:       Encounter Diagnoses   Name Primary?    Foot ulcer, right, with fat layer exposed Yes    Type II diabetes mellitus with neurological manifestations          Plan:       Billy was seen today for wound care.    Diagnoses and all orders for this visit:    Foot ulcer, right, with fat layer exposed    Type II diabetes mellitus with neurological manifestations        Independent review of patients previous clinical notes    Reviewed current medication(s), and lab(s) specific to foot ailment(s) with patient in detail. All questions answered     The nature of the condition, options for management, as well as potential risks and complications were discussed in detail with patient. Patient was amenable to my recommendations and left my office fully informed and will follow up as instructed or sooner if necessary.      Wound Debridement    Performed by: Mingo Melara DPM   Authorized by: Patient    Time out: Immediately prior to procedure a "time out" was called to verify the correct patient, procedure, equipment, support staff and site/side marked as required.   Consent Done?:  Yes (Verbal)  Local " anesthesia used?: No       Wound Details:    Location:  Right foot     Type of Debridement:  Excisional       Length (cm):  0.2 cm       Width (cm):  0.2 cm       Depth (cm):  0.1 cm       Percent Debrided (%):  100             Depth of debridement:  Subcutaneous tissue    Tissue debrided:  Dermis, Epidermis and Subcutaneous    Devitalized tissue debrided:  Biofilm, Callus and Necrotic/Eschar    Instruments:  Blade, Curette and Nippers     Bleeding:  Minimal  Hemostasis Achieved: Yes    Method Used:  Pressure  Patient tolerance:  Patient tolerated the procedure well with no immediate complications    Shoe inspection. Diabetic Foot Education. Patient reminded of the importance of good nutrition and blood sugar control to help prevent podiatric complications of diabetes. Patient instructed on proper foot hygeine. We discussed wearing proper shoe gear, daily foot inspections and Diabetic foot education in detail.    Return to clinic in 1 month or sooner if problems arise

## 2024-10-17 ENCOUNTER — OFFICE VISIT (OUTPATIENT)
Dept: PODIATRY | Facility: CLINIC | Age: 59
End: 2024-10-17
Payer: COMMERCIAL

## 2024-10-17 VITALS
HEART RATE: 60 BPM | SYSTOLIC BLOOD PRESSURE: 144 MMHG | HEIGHT: 74 IN | WEIGHT: 288.81 LBS | DIASTOLIC BLOOD PRESSURE: 79 MMHG | BODY MASS INDEX: 37.06 KG/M2

## 2024-10-17 DIAGNOSIS — M21.961 METATARSAL DEFORMITY, RIGHT: ICD-10-CM

## 2024-10-17 DIAGNOSIS — L97.512 FOOT ULCER, RIGHT, WITH FAT LAYER EXPOSED: ICD-10-CM

## 2024-10-17 DIAGNOSIS — E11.49 TYPE II DIABETES MELLITUS WITH NEUROLOGICAL MANIFESTATIONS: Primary | ICD-10-CM

## 2024-10-17 PROCEDURE — 3078F DIAST BP <80 MM HG: CPT | Mod: CPTII,S$GLB,, | Performed by: PODIATRIST

## 2024-10-17 PROCEDURE — 1160F RVW MEDS BY RX/DR IN RCRD: CPT | Mod: CPTII,S$GLB,, | Performed by: PODIATRIST

## 2024-10-17 PROCEDURE — 4010F ACE/ARB THERAPY RXD/TAKEN: CPT | Mod: CPTII,S$GLB,, | Performed by: PODIATRIST

## 2024-10-17 PROCEDURE — 1159F MED LIST DOCD IN RCRD: CPT | Mod: CPTII,S$GLB,, | Performed by: PODIATRIST

## 2024-10-17 PROCEDURE — 11042 DBRDMT SUBQ TIS 1ST 20SQCM/<: CPT | Mod: S$GLB,,, | Performed by: PODIATRIST

## 2024-10-17 PROCEDURE — 3008F BODY MASS INDEX DOCD: CPT | Mod: CPTII,S$GLB,, | Performed by: PODIATRIST

## 2024-10-17 PROCEDURE — 99999 PR PBB SHADOW E&M-EST. PATIENT-LVL V: CPT | Mod: PBBFAC,,, | Performed by: PODIATRIST

## 2024-10-17 PROCEDURE — 99213 OFFICE O/P EST LOW 20 MIN: CPT | Mod: 25,S$GLB,, | Performed by: PODIATRIST

## 2024-10-17 PROCEDURE — 3077F SYST BP >= 140 MM HG: CPT | Mod: CPTII,S$GLB,, | Performed by: PODIATRIST

## 2024-10-17 NOTE — PROGRESS NOTES
Subjective:      Patient ID: Billy Rivera is a 59 y.o. male.    Chief Complaint:  Follow-up wound care, right foot.      Billy is a 59 y.o. male who presents to the clinic for evaluation and treatment of high risk feet. Billy has a past medical history of Allergy, CAD (coronary artery disease), native coronary artery (6/25/2013), Cancer, COVID-19 virus detected (9/1/2020), Diabetes mellitus, Diabetes mellitus, type 2, Disorder of kidney and ureter, Heart attack (04/2012), colon cancer, stage I, Hyperlipidemia, Hypertension, Muscular pain, NSTEMI May 2013 - peak troponin 0.22 (5/22/2013), MICHAELA (obstructive sleep apnea), and Retinopathy due to secondary diabetes.  New issue ulceration/blister right foot. Improving.  PCP: Augie Ruiz MD    Date Last Seen by PCP:   Chief Complaint   Patient presents with    Wound Care     Right ball of foot        History of Trauma: negative  Sign of Infection: none        Hemoglobin A1C   Date Value Ref Range Status   07/20/2023 8.7 (H) 4.0 - 5.6 % Final     Comment:     ADA Screening Guidelines:  5.7-6.4%  Consistent with prediabetes  >or=6.5%  Consistent with diabetes    High levels of fetal hemoglobin interfere with the HbA1C  assay. Heterozygous hemoglobin variants (HbS, HgC, etc)do  not significantly interfere with this assay.   However, presence of multiple variants may affect accuracy.     04/06/2023 9.4 (H) 4.0 - 5.6 % Final     Comment:     ADA Screening Guidelines:  5.7-6.4%  Consistent with prediabetes  >or=6.5%  Consistent with diabetes    High levels of fetal hemoglobin interfere with the HbA1C  assay. Heterozygous hemoglobin variants (HbS, HgC, etc)do  not significantly interfere with this assay.   However, presence of multiple variants may affect accuracy.     11/10/2022 7.1 (H) 4.0 - 5.6 % Final     Comment:     ADA Screening Guidelines:  5.7-6.4%  Consistent with prediabetes  >or=6.5%  Consistent with diabetes    High levels of fetal hemoglobin  "interfere with the HbA1C  assay. Heterozygous hemoglobin variants (HbS, HgC, etc)do  not significantly interfere with this assay.   However, presence of multiple variants may affect accuracy.                 Objective:      Vitals:    10/17/24 0929   BP: (!) 144/79   Pulse: 60   Weight: 131 kg (288 lb 12.8 oz)   Height: 6' 2" (1.88 m)   PainSc: 0-No pain        Physical Exam  Constitutional:       General: He is not in acute distress.     Appearance: Normal appearance. He is well-developed and normal weight.   HENT:      Head: Normocephalic and atraumatic.   Cardiovascular:      Pulses:           Dorsalis pedis pulses are 2+ on the right side and 2+ on the left side.        Posterior tibial pulses are 1+ on the right side and 1+ on the left side.      Comments: CFT< 3 secs all toes bilateral foot, skin temp warm to cool bilateral foot, no hair growth bilateral lower extremity, no edema to bilateral lower extremities    Pulmonary:      Effort: Pulmonary effort is normal.   Musculoskeletal:         General: No swelling.      Right foot: Normal range of motion. Deformity present. No foot drop.      Left foot: Normal range of motion. Deformity present. No foot drop.      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     5/5 muscle strength with DF/PF/Inv/Ev bilateral.       Feet:      Right foot:      Protective Sensation: 5 sites tested.  0 sites sensed.      Skin integrity: Ulcer, skin breakdown, callus and dry skin present. No erythema.      Toenail Condition: Right toenails are abnormally thick.      Left foot:      Protective Sensation: 5 sites tested.  0 sites sensed.      Skin integrity: Callus and dry skin present. No ulcer, skin breakdown or erythema.      Toenail Condition: Left toenails are abnormally thick.   Skin:     General: Skin is warm and dry.      Capillary Refill: Capillary refill takes 2 to 3 seconds.      Findings: No erythema.      Nails: There is no " clubbing.      Comments: Ulceration location:  Right ulceration sub 2nd MPJ noted measuring 0.5 cm x 0.1 cm x 0.1 cm.   Neurological:      Mental Status: He is alert and oriented to person, place, and time.      Sensory: Sensory deficit present.      Motor: No weakness.      Deep Tendon Reflexes:      Reflex Scores:       Patellar reflexes are 2+ on the right side and 2+ on the left side.       Achilles reflexes are 2+ on the right side and 2+ on the left side.     Comments: Diminished/loss of protective sensation all toes bilateral to 10 gram monofilament.   Psychiatric:         Mood and Affect: Mood normal.         Behavior: Behavior normal.         Thought Content: Thought content normal.       Wound Details:    Location:  Right foot     Type of Debridement:  Excisional       Length (cm):  0.2 cm       Width (cm):  0.2 cm       Depth (cm):  0.1 cm  Assessment:       Encounter Diagnoses   Name Primary?    Type II diabetes mellitus with neurological manifestations Yes    Foot ulcer, right, with fat layer exposed     Metatarsal deformity, right          Plan:       Billy was seen today for wound care.    Diagnoses and all orders for this visit:    Type II diabetes mellitus with neurological manifestations  -     Case Request Operating Room: EXCISION,TARSAL OR METATARSAL BONE,EXCLUDING TALUS OR CALCANEUS,PARTIAL    Foot ulcer, right, with fat layer exposed  -     Case Request Operating Room: EXCISION,TARSAL OR METATARSAL BONE,EXCLUDING TALUS OR CALCANEUS,PARTIAL    Metatarsal deformity, right  -     Case Request Operating Room: EXCISION,TARSAL OR METATARSAL BONE,EXCLUDING TALUS OR CALCANEUS,PARTIAL        Independent review of patients previous clinical notes    Reviewed current medication(s), and lab(s) specific to foot ailment(s) with patient in detail. All questions answered     The nature of the condition, options for management, as well as potential risks and complications were discussed in detail with  "patient. Patient was amenable to my recommendations and left my office fully informed and will follow up as instructed or sooner if necessary.      Wound Debridement    Performed by: Mingo Melara DPM   Authorized by: Patient    Time out: Immediately prior to procedure a "time out" was called to verify the correct patient, procedure, equipment, support staff and site/side marked as required.   Consent Done?:  Yes (Verbal)  Local anesthesia used?: No       Wound Details:    Location:  Right foot     Type of Debridement:  Excisional       Length (cm):  0.5 cm       Width (cm):  0.1cm       Depth:  0.1 cm       Percent Debrided (%):  100             Depth of debridement:  Subcutaneous tissue    Tissue debrided:  Dermis, Epidermis and Subcutaneous    Devitalized tissue debrided:  Biofilm, Callus and Necrotic/Eschar    Instruments:  Blade, Curette and Nippers     Bleeding:  Minimal  Hemostasis Achieved: Yes    Method Used:  Pressure  Patient tolerance:  Patient tolerated the procedure well with no immediate complications    Shoe inspection. Diabetic Foot Education. Patient reminded of the importance of good nutrition and blood sugar control to help prevent podiatric complications of diabetes. Patient instructed on proper foot hygeine. We discussed wearing proper shoe gear, daily foot inspections and Diabetic foot education in detail.    The patient has decided to forego any further conservative treatment and opted for surgical intervention.  Alternative treatments, and benefits of surgery were discussed with the patient.  Risks and complications, including but not limited to: pain, swelling, numbness, infection, failure to achieve the stated goals, recurrence of problem, nerve and blood vessel damage, scar tissue formation, further need of surgery, loss of limb or life, was discussed in detail with the patient.  All questions asked were answered, and written informed consent was sign and received. The patient will " undergo 2nd metatarsal head resection right foot.

## 2024-10-18 ENCOUNTER — PATIENT MESSAGE (OUTPATIENT)
Dept: PODIATRY | Facility: CLINIC | Age: 59
End: 2024-10-18
Payer: COMMERCIAL

## 2024-10-23 ENCOUNTER — TELEPHONE (OUTPATIENT)
Dept: INTERNAL MEDICINE | Facility: CLINIC | Age: 59
End: 2024-10-23
Payer: COMMERCIAL

## 2024-10-23 ENCOUNTER — TELEPHONE (OUTPATIENT)
Dept: PODIATRY | Facility: CLINIC | Age: 59
End: 2024-10-23

## 2024-10-23 ENCOUNTER — OFFICE VISIT (OUTPATIENT)
Dept: PODIATRY | Facility: CLINIC | Age: 59
End: 2024-10-23
Payer: COMMERCIAL

## 2024-10-23 VITALS
SYSTOLIC BLOOD PRESSURE: 135 MMHG | BODY MASS INDEX: 36.78 KG/M2 | WEIGHT: 286.63 LBS | HEART RATE: 60 BPM | DIASTOLIC BLOOD PRESSURE: 75 MMHG | HEIGHT: 74 IN

## 2024-10-23 DIAGNOSIS — L97.512 FOOT ULCER, RIGHT, WITH FAT LAYER EXPOSED: ICD-10-CM

## 2024-10-23 DIAGNOSIS — E11.49 TYPE II DIABETES MELLITUS WITH NEUROLOGICAL MANIFESTATIONS: Primary | ICD-10-CM

## 2024-10-23 DIAGNOSIS — M21.961 METATARSAL DEFORMITY, RIGHT: ICD-10-CM

## 2024-10-23 PROCEDURE — 99213 OFFICE O/P EST LOW 20 MIN: CPT | Mod: S$GLB,,, | Performed by: PODIATRIST

## 2024-10-23 PROCEDURE — 99999 PR PBB SHADOW E&M-EST. PATIENT-LVL IV: CPT | Mod: PBBFAC,,, | Performed by: PODIATRIST

## 2024-10-23 PROCEDURE — 3075F SYST BP GE 130 - 139MM HG: CPT | Mod: CPTII,S$GLB,, | Performed by: PODIATRIST

## 2024-10-23 PROCEDURE — 4010F ACE/ARB THERAPY RXD/TAKEN: CPT | Mod: CPTII,S$GLB,, | Performed by: PODIATRIST

## 2024-10-23 PROCEDURE — 1159F MED LIST DOCD IN RCRD: CPT | Mod: CPTII,S$GLB,, | Performed by: PODIATRIST

## 2024-10-23 PROCEDURE — 3078F DIAST BP <80 MM HG: CPT | Mod: CPTII,S$GLB,, | Performed by: PODIATRIST

## 2024-10-23 PROCEDURE — 3008F BODY MASS INDEX DOCD: CPT | Mod: CPTII,S$GLB,, | Performed by: PODIATRIST

## 2024-10-23 NOTE — PROGRESS NOTES
Subjective:      Patient ID: Billy Rivera is a 59 y.o. male.    Chief Complaint:  Follow-up wound care, right foot.      Billy is a 59 y.o. male who presents to the clinic for evaluation and treatment of high risk feet. Billy has a past medical history of Allergy, CAD (coronary artery disease), native coronary artery (6/25/2013), Cancer, COVID-19 virus detected (9/1/2020), Diabetes mellitus, Diabetes mellitus, type 2, Disorder of kidney and ureter, Heart attack (04/2012), colon cancer, stage I, Hyperlipidemia, Hypertension, Muscular pain, NSTEMI May 2013 - peak troponin 0.22 (5/22/2013), MICHAELA (obstructive sleep apnea), and Retinopathy due to secondary diabetes.  New issue ulceration/blister right foot. Improving.  Scheduled for metatarsal head resection last visit.  PCP: Augie Ruiz MD    Date Last Seen by PCP:   Chief Complaint   Patient presents with    Wound Check     Right ball of foot        History of Trauma: negative  Sign of Infection: none        Hemoglobin A1C   Date Value Ref Range Status   07/20/2023 8.7 (H) 4.0 - 5.6 % Final     Comment:     ADA Screening Guidelines:  5.7-6.4%  Consistent with prediabetes  >or=6.5%  Consistent with diabetes    High levels of fetal hemoglobin interfere with the HbA1C  assay. Heterozygous hemoglobin variants (HbS, HgC, etc)do  not significantly interfere with this assay.   However, presence of multiple variants may affect accuracy.     04/06/2023 9.4 (H) 4.0 - 5.6 % Final     Comment:     ADA Screening Guidelines:  5.7-6.4%  Consistent with prediabetes  >or=6.5%  Consistent with diabetes    High levels of fetal hemoglobin interfere with the HbA1C  assay. Heterozygous hemoglobin variants (HbS, HgC, etc)do  not significantly interfere with this assay.   However, presence of multiple variants may affect accuracy.     11/10/2022 7.1 (H) 4.0 - 5.6 % Final     Comment:     ADA Screening Guidelines:  5.7-6.4%  Consistent with prediabetes  >or=6.5%   "Consistent with diabetes    High levels of fetal hemoglobin interfere with the HbA1C  assay. Heterozygous hemoglobin variants (HbS, HgC, etc)do  not significantly interfere with this assay.   However, presence of multiple variants may affect accuracy.                 Objective:      Vitals:    10/23/24 0815   BP: 135/75   Pulse: 60   Weight: 130 kg (286 lb 9.6 oz)   Height: 6' 2" (1.88 m)   PainSc: 0-No pain        Physical Exam  Constitutional:       General: He is not in acute distress.     Appearance: Normal appearance. He is well-developed and normal weight.   HENT:      Head: Normocephalic and atraumatic.   Cardiovascular:      Pulses:           Dorsalis pedis pulses are 2+ on the right side and 2+ on the left side.        Posterior tibial pulses are 1+ on the right side and 1+ on the left side.      Comments: CFT< 3 secs all toes bilateral foot, skin temp warm to cool bilateral foot, no hair growth bilateral lower extremity, no edema to bilateral lower extremities    Pulmonary:      Effort: Pulmonary effort is normal.   Musculoskeletal:         General: No swelling.      Right foot: Normal range of motion. Deformity present. No foot drop.      Left foot: Normal range of motion. Deformity present. No foot drop.      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     5/5 muscle strength with DF/PF/Inv/Ev bilateral.       Feet:      Right foot:      Protective Sensation: 5 sites tested.  0 sites sensed.      Skin integrity: Ulcer, skin breakdown, callus and dry skin present. No erythema.      Toenail Condition: Right toenails are abnormally thick.      Left foot:      Protective Sensation: 5 sites tested.  0 sites sensed.      Skin integrity: Callus and dry skin present. No ulcer, skin breakdown or erythema.      Toenail Condition: Left toenails are abnormally thick.   Skin:     General: Skin is warm and dry.      Capillary Refill: Capillary refill takes 2 to 3 seconds. "      Findings: No erythema.      Nails: There is no clubbing.      Comments: Ulceration location:  Right ulceration sub 2nd MPJ noted to be healed.   Neurological:      Mental Status: He is alert and oriented to person, place, and time.      Sensory: Sensory deficit present.      Motor: No weakness.      Deep Tendon Reflexes:      Reflex Scores:       Patellar reflexes are 2+ on the right side and 2+ on the left side.       Achilles reflexes are 2+ on the right side and 2+ on the left side.     Comments: Diminished/loss of protective sensation all toes bilateral to 10 gram monofilament.   Psychiatric:         Mood and Affect: Mood normal.         Behavior: Behavior normal.         Thought Content: Thought content normal.       Wound Details:    Location:  Right foot/healed      Encounter Diagnoses   Name Primary?    Type II diabetes mellitus with neurological manifestations Yes    Foot ulcer, right, with fat layer exposed     Metatarsal deformity, right          Plan:       Billy was seen today for wound check.    Diagnoses and all orders for this visit:    Type II diabetes mellitus with neurological manifestations    Foot ulcer, right, with fat layer exposed    Metatarsal deformity, right        Independent review of patients previous clinical notes    Reviewed current medication(s), and lab(s) specific to foot ailment(s) with patient in detail. All questions answered     The nature of the condition, options for management, as well as potential risks and complications were discussed in detail with patient. Patient was amenable to my recommendations and left my office fully informed and will follow up as instructed or sooner if necessary.      Shoe inspection. Diabetic Foot Education. Patient reminded of the importance of good nutrition and blood sugar control to help prevent podiatric complications of diabetes. Patient instructed on proper foot hygeine. We discussed wearing proper shoe gear, daily foot  inspections and Diabetic foot education in detail.    The patient has decided to forego any further conservative treatment and opted for surgical intervention.  Alternative treatments, and benefits of surgery were discussed with the patient.  Risks and complications, including but not limited to: pain, swelling, numbness, infection, failure to achieve the stated goals, recurrence of problem, nerve and blood vessel damage, scar tissue formation, further need of surgery, loss of limb or life, was discussed in detail with the patient.  All questions asked were answered, and written informed consent was sign and received. The patient will undergo 2nd metatarsal head resection right foot.

## 2024-10-23 NOTE — TELEPHONE ENCOUNTER
Spoke to pt and discussed 12/6 surgery date. Also advised pt to contact their PCP to schedule an appointment to be cleared for surgery. Then advised pt that someone will call them the day before surgery between 3p-5p with the surgery arrival time and instructions. Pt verbalized understanding and call ended.

## 2024-10-23 NOTE — TELEPHONE ENCOUNTER
I spoke to pt and scheduled pre-op visit with Dr. Ruiz on 11-27-24 at 8 am  Pt verbalized understanding

## 2024-10-23 NOTE — TELEPHONE ENCOUNTER
----- Message from Med Assistant Thomas sent at 10/23/2024  2:32 PM CDT -----  Regarding: surgery scheduler  Stephan Rivera is scheduled to have a EXCISION,TARSAL OR METATARSAL BONE,EXCLUDING TALUS OR CALCANEUS,PARTIAL Right Local MAC with Dr. Mingo Melara  on 12/6.  We request surgical clearance.  Please assist in scheduling. We request a  EKG, CBC, BMP, and any other testing deemed necessary by your office. When completed please note chart  stating whether the patient is cleared., along with any results to attention Martha Betts.  Please contact us if you have any questions.  Our office number is [phone].    Sincerely,      Martha Betts MA  Foot and Ankle Surgery  [facility], Department of Podiatry

## 2024-10-27 ENCOUNTER — TELEPHONE (OUTPATIENT)
Dept: PHARMACY | Facility: CLINIC | Age: 59
End: 2024-10-27
Payer: COMMERCIAL

## 2024-10-30 ENCOUNTER — OFFICE VISIT (OUTPATIENT)
Dept: PODIATRY | Facility: CLINIC | Age: 59
End: 2024-10-30
Payer: COMMERCIAL

## 2024-10-30 VITALS
HEIGHT: 74 IN | DIASTOLIC BLOOD PRESSURE: 76 MMHG | BODY MASS INDEX: 36.8 KG/M2 | HEART RATE: 70 BPM | SYSTOLIC BLOOD PRESSURE: 132 MMHG

## 2024-10-30 DIAGNOSIS — L97.512 FOOT ULCER, RIGHT, WITH FAT LAYER EXPOSED: ICD-10-CM

## 2024-10-30 DIAGNOSIS — E11.49 TYPE II DIABETES MELLITUS WITH NEUROLOGICAL MANIFESTATIONS: Primary | ICD-10-CM

## 2024-10-30 DIAGNOSIS — M21.961 METATARSAL DEFORMITY, RIGHT: ICD-10-CM

## 2024-10-30 PROCEDURE — 4010F ACE/ARB THERAPY RXD/TAKEN: CPT | Mod: CPTII,S$GLB,, | Performed by: PODIATRIST

## 2024-10-30 PROCEDURE — 99999 PR PBB SHADOW E&M-EST. PATIENT-LVL V: CPT | Mod: PBBFAC,,, | Performed by: PODIATRIST

## 2024-10-30 PROCEDURE — 3008F BODY MASS INDEX DOCD: CPT | Mod: CPTII,S$GLB,, | Performed by: PODIATRIST

## 2024-10-30 PROCEDURE — 3078F DIAST BP <80 MM HG: CPT | Mod: CPTII,S$GLB,, | Performed by: PODIATRIST

## 2024-10-30 PROCEDURE — 3075F SYST BP GE 130 - 139MM HG: CPT | Mod: CPTII,S$GLB,, | Performed by: PODIATRIST

## 2024-10-30 PROCEDURE — 11042 DBRDMT SUBQ TIS 1ST 20SQCM/<: CPT | Mod: S$GLB,,,

## 2024-10-30 PROCEDURE — 99213 OFFICE O/P EST LOW 20 MIN: CPT | Mod: 25,S$GLB,, | Performed by: PODIATRIST

## 2024-11-03 NOTE — PROGRESS NOTES
Subjective:      Patient ID: Billy Rivera is a 59 y.o. male.    Chief Complaint:  Follow-up wound care, right foot.      Billy is a 59 y.o. male who presents to the clinic for evaluation and treatment of high risk feet. Billy has a past medical history of Allergy, CAD (coronary artery disease), native coronary artery (6/25/2013), Cancer, COVID-19 virus detected (9/1/2020), Diabetes mellitus, Diabetes mellitus, type 2, Disorder of kidney and ureter, Heart attack (04/2012), colon cancer, stage I, Hyperlipidemia, Hypertension, Muscular pain, NSTEMI May 2013 - peak troponin 0.22 (5/22/2013), MICHAELA (obstructive sleep apnea), and Retinopathy due to secondary diabetes.  New issue ulceration/blister right foot. Improving.  Scheduled for metatarsal head resection last visit.    10/30:  Patient presents to clinic for follow up of R plantar foot wound.  Has been dressing his foot with Iodosorb and bandage.  Denies any pain to his R foot.  Denies noted seeing any redness or swelling to his R foot.  Denies any new complaints to his R foot.    PCP: Augie Ruiz MD    Date Last Seen by PCP:   Chief Complaint   Patient presents with    Diabetes Mellitus     Pcp Augie Ruiz MD 04/30/2024    Wound Care     Right foot     History of Trauma: negative  Sign of Infection: none    Hemoglobin A1C   Date Value Ref Range Status   07/20/2023 8.7 (H) 4.0 - 5.6 % Final     Comment:     ADA Screening Guidelines:  5.7-6.4%  Consistent with prediabetes  >or=6.5%  Consistent with diabetes    High levels of fetal hemoglobin interfere with the HbA1C  assay. Heterozygous hemoglobin variants (HbS, HgC, etc)do  not significantly interfere with this assay.   However, presence of multiple variants may affect accuracy.     04/06/2023 9.4 (H) 4.0 - 5.6 % Final     Comment:     ADA Screening Guidelines:  5.7-6.4%  Consistent with prediabetes  >or=6.5%  Consistent with diabetes    High levels of fetal hemoglobin interfere with the  "HbA1C  assay. Heterozygous hemoglobin variants (HbS, HgC, etc)do  not significantly interfere with this assay.   However, presence of multiple variants may affect accuracy.     11/10/2022 7.1 (H) 4.0 - 5.6 % Final     Comment:     ADA Screening Guidelines:  5.7-6.4%  Consistent with prediabetes  >or=6.5%  Consistent with diabetes    High levels of fetal hemoglobin interfere with the HbA1C  assay. Heterozygous hemoglobin variants (HbS, HgC, etc)do  not significantly interfere with this assay.   However, presence of multiple variants may affect accuracy.                 Objective:      Vitals:    10/30/24 1127   BP: 132/76   Pulse: 70   Height: 6' 2" (1.88 m)   PainSc: 0-No pain   PainLoc: (S) Foot        Physical Exam  Constitutional:       General: He is not in acute distress.     Appearance: Normal appearance. He is well-developed and normal weight.   HENT:      Head: Normocephalic and atraumatic.   Cardiovascular:      Pulses:           Dorsalis pedis pulses are 2+ on the right side and 2+ on the left side.        Posterior tibial pulses are 1+ on the right side and 1+ on the left side.      Comments: CFT< 3 secs all toes bilateral foot, skin temp warm to cool bilateral foot, no hair growth bilateral lower extremity, no edema to bilateral lower extremities    Pulmonary:      Effort: Pulmonary effort is normal.   Musculoskeletal:         General: No swelling.      Right foot: Normal range of motion. Deformity present. No foot drop.      Left foot: Normal range of motion. Deformity present. No foot drop.      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     5/5 muscle strength with DF/PF/Inv/Ev bilateral.       Feet:      Right foot:      Protective Sensation: 5 sites tested.  0 sites sensed.      Skin integrity: Ulcer, skin breakdown, callus and dry skin present. No erythema.      Toenail Condition: Right toenails are abnormally thick.      Left foot:      Protective " Sensation: 5 sites tested.  0 sites sensed.      Skin integrity: Callus and dry skin present. No ulcer, skin breakdown or erythema.      Toenail Condition: Left toenails are abnormally thick.   Skin:     General: Skin is warm and dry.      Capillary Refill: Capillary refill takes 2 to 3 seconds.      Findings: No erythema.      Nails: There is no clubbing.      Comments: Ulceration location:  Right ulceration sub 2nd MPJ noted to be healed.   Neurological:      Mental Status: He is alert and oriented to person, place, and time.      Sensory: Sensory deficit present.      Motor: No weakness.      Deep Tendon Reflexes:      Reflex Scores:       Patellar reflexes are 2+ on the right side and 2+ on the left side.       Achilles reflexes are 2+ on the right side and 2+ on the left side.     Comments: Diminished/loss of protective sensation all toes bilateral to 10 gram monofilament.   Psychiatric:         Mood and Affect: Mood normal.         Behavior: Behavior normal.         Thought Content: Thought content normal.         10/30:  Plantar R foot wound with hyperkeratotic periwound.  Negative probe to bone.  No signs of infection.        Encounter Diagnoses   Name Primary?    Type II diabetes mellitus with neurological manifestations Yes    Foot ulcer, right, with fat layer exposed     Metatarsal deformity, right          Plan:       Billy was seen today for diabetes mellitus and wound care.    Diagnoses and all orders for this visit:    Type II diabetes mellitus with neurological manifestations  -     Wound Debridement    Foot ulcer, right, with fat layer exposed  -     Wound Debridement    Metatarsal deformity, right        Independent review of patients previous clinical notes    Wound debrided to healthy tissue.  Full procedure note below.    Reviewed current medication(s), and lab(s) specific to foot ailment(s) with patient in detail. All questions answered     The nature of the condition, options for management,  "as well as potential risks and complications were discussed in detail with patient. Patient was amenable to my recommendations and left my office fully informed and will follow up as instructed or sooner if necessary.      Shoe inspection. Diabetic Foot Education. Patient reminded of the importance of good nutrition and blood sugar control to help prevent podiatric complications of diabetes. Patient instructed on proper foot hygeine. We discussed wearing proper shoe gear, daily foot inspections and Diabetic foot education in detail.    Luis Coelho DPM   Podiatric Medicine & Surgery  [facility]    Wound Debridement    Date/Time: 10/30/2024 11:30 AM    Performed by: Luis Coelho DPM  Authorized by: Mingo Melara DPM    Time out: Immediately prior to procedure a "time out" was called to verify the correct patient, procedure, equipment, support staff and site/side marked as required.    Consent Done?:  Yes (Verbal)    Preparation: Patient was prepped and draped with clean technique    Local anesthesia used?: No      Wound Details:    Location:  Right foot    Location:  Right Plantar    Type of Debridement:  Excisional       Length (cm):  2       Area (sq cm):  3       Width (cm):  1.5       Percent Debrided (%):  100       Depth (cm):  0.1       Total Area Debrided (sq cm):  3    Depth of debridement:  Subcutaneous tissue    Tissue debrided:  Subcutaneous    Devitalized tissue debrided:  Slough, Necrotic/Eschar, Callus and Biofilm    Instruments:  Curette and Blade  Bleeding:  None  Patient tolerance:  Patient tolerated the procedure well with no immediate complications                                          "

## 2024-11-05 ENCOUNTER — TELEPHONE (OUTPATIENT)
Dept: INTERNAL MEDICINE | Facility: CLINIC | Age: 59
End: 2024-11-05
Payer: COMMERCIAL

## 2024-11-05 ENCOUNTER — OFFICE VISIT (OUTPATIENT)
Dept: PODIATRY | Facility: CLINIC | Age: 59
End: 2024-11-05
Payer: COMMERCIAL

## 2024-11-05 VITALS
WEIGHT: 286.63 LBS | BODY MASS INDEX: 36.78 KG/M2 | SYSTOLIC BLOOD PRESSURE: 132 MMHG | HEART RATE: 58 BPM | DIASTOLIC BLOOD PRESSURE: 77 MMHG | HEIGHT: 74 IN

## 2024-11-05 DIAGNOSIS — E11.49 TYPE II DIABETES MELLITUS WITH NEUROLOGICAL MANIFESTATIONS: Primary | ICD-10-CM

## 2024-11-05 DIAGNOSIS — M21.961 METATARSAL DEFORMITY, RIGHT: ICD-10-CM

## 2024-11-05 DIAGNOSIS — L97.512 FOOT ULCER, RIGHT, WITH FAT LAYER EXPOSED: ICD-10-CM

## 2024-11-05 PROCEDURE — 3075F SYST BP GE 130 - 139MM HG: CPT | Mod: CPTII,S$GLB,, | Performed by: PODIATRIST

## 2024-11-05 PROCEDURE — 4010F ACE/ARB THERAPY RXD/TAKEN: CPT | Mod: CPTII,S$GLB,, | Performed by: PODIATRIST

## 2024-11-05 PROCEDURE — 3078F DIAST BP <80 MM HG: CPT | Mod: CPTII,S$GLB,, | Performed by: PODIATRIST

## 2024-11-05 PROCEDURE — 3008F BODY MASS INDEX DOCD: CPT | Mod: CPTII,S$GLB,, | Performed by: PODIATRIST

## 2024-11-05 PROCEDURE — 99999 PR PBB SHADOW E&M-EST. PATIENT-LVL V: CPT | Mod: PBBFAC,,, | Performed by: PODIATRIST

## 2024-11-05 PROCEDURE — 99213 OFFICE O/P EST LOW 20 MIN: CPT | Mod: S$GLB,,, | Performed by: PODIATRIST

## 2024-11-05 NOTE — TELEPHONE ENCOUNTER
----- Message from Stephanie sent at 11/5/2024 12:09 PM CST -----  Contact: [phone]  Patient states he needs to speak with the Doctor concerning Endocrinologist patient was with Dr. Powell.  Patient states he needs to speak with someone ASAP. Please call and advise.    Thank you and have a great day.

## 2024-11-08 ENCOUNTER — OFFICE VISIT (OUTPATIENT)
Dept: INTERNAL MEDICINE | Facility: CLINIC | Age: 59
End: 2024-11-08
Payer: COMMERCIAL

## 2024-11-08 ENCOUNTER — TELEPHONE (OUTPATIENT)
Dept: PHARMACY | Facility: CLINIC | Age: 59
End: 2024-11-08
Payer: COMMERCIAL

## 2024-11-08 VITALS
DIASTOLIC BLOOD PRESSURE: 84 MMHG | HEIGHT: 74 IN | BODY MASS INDEX: 37.86 KG/M2 | WEIGHT: 295 LBS | SYSTOLIC BLOOD PRESSURE: 170 MMHG | HEART RATE: 70 BPM | OXYGEN SATURATION: 97 %

## 2024-11-08 DIAGNOSIS — E11.59 HYPERTENSION ASSOCIATED WITH DIABETES: Chronic | ICD-10-CM

## 2024-11-08 DIAGNOSIS — Z79.4 TYPE 2 DIABETES MELLITUS WITH HYPERGLYCEMIA, WITH LONG-TERM CURRENT USE OF INSULIN: Primary | ICD-10-CM

## 2024-11-08 DIAGNOSIS — E11.65 TYPE 2 DIABETES MELLITUS WITH HYPERGLYCEMIA, WITH LONG-TERM CURRENT USE OF INSULIN: Primary | ICD-10-CM

## 2024-11-08 DIAGNOSIS — E78.5 DYSLIPIDEMIA ASSOCIATED WITH TYPE 2 DIABETES MELLITUS: ICD-10-CM

## 2024-11-08 DIAGNOSIS — I15.2 HYPERTENSION ASSOCIATED WITH DIABETES: Chronic | ICD-10-CM

## 2024-11-08 DIAGNOSIS — Z79.4 TYPE 2 DIABETES MELLITUS WITH BOTH EYES AFFECTED BY MODERATE NONPROLIFERATIVE RETINOPATHY WITHOUT MACULAR EDEMA, WITH LONG-TERM CURRENT USE OF INSULIN: ICD-10-CM

## 2024-11-08 DIAGNOSIS — E11.69 DYSLIPIDEMIA ASSOCIATED WITH TYPE 2 DIABETES MELLITUS: ICD-10-CM

## 2024-11-08 DIAGNOSIS — E11.3393 TYPE 2 DIABETES MELLITUS WITH BOTH EYES AFFECTED BY MODERATE NONPROLIFERATIVE RETINOPATHY WITHOUT MACULAR EDEMA, WITH LONG-TERM CURRENT USE OF INSULIN: ICD-10-CM

## 2024-11-08 DIAGNOSIS — Z79.4 TYPE 2 DIABETES MELLITUS WITH DIABETIC PERIPHERAL ANGIOPATHY WITHOUT GANGRENE, WITH LONG-TERM CURRENT USE OF INSULIN: Chronic | ICD-10-CM

## 2024-11-08 DIAGNOSIS — E11.51 TYPE 2 DIABETES MELLITUS WITH DIABETIC PERIPHERAL ANGIOPATHY WITHOUT GANGRENE, WITH LONG-TERM CURRENT USE OF INSULIN: Chronic | ICD-10-CM

## 2024-11-08 PROCEDURE — 99999 PR PBB SHADOW E&M-EST. PATIENT-LVL V: CPT | Mod: PBBFAC,,,

## 2024-11-08 RX ORDER — HUMAN INSULIN 100 [IU]/ML
INJECTION, SUSPENSION SUBCUTANEOUS
Qty: 15 ML | Refills: 3 | Status: SHIPPED | OUTPATIENT
Start: 2024-11-08

## 2024-11-08 NOTE — Clinical Note
Reports he no longer can afford pump supplies and would like to re-start MDI with Ozempic; Will start Novolin 70/30 once Novolin-R runs out today. Also, referred him to pharmacy PA program. Provided a BG log and ordered labs. Will see him again in 4w.  Peterson

## 2024-11-08 NOTE — TELEPHONE ENCOUNTER
Billy Rivera has been informed of the [employer], [employer] Nord[employer], and [employer] application process for Humalog, Lantus Pens, Novolog Pens, and Ozempic 0.5mg and what's required to apply.  He will provide the following documents: Proof of household Income( such as social security statement, 1099 form, pension statement or 3 consecutive pay stubs, Copy of all Insurance cards( front and back), and Completed Medication Access Center Authorization Forms        Follow-up will be made in 5 business days.     Sergio Mascorro   Pharmacy Patient Assistance Team

## 2024-11-08 NOTE — PATIENT INSTRUCTIONS
Stop insulin pump once current insulin runs out.    Start Novolin 70/30  (36 units before breakfast and 30 units before dinner).    Start checking your blood sugars at least 3 times daily. Be sure to write your blood glucose readings on the log and bring the log paper to your next visit.     Bring your glucometer to every visit.     Get labs drawn 1 week prior to your next visit. Be sure to fasten after midnight before having labs drawn.     Someone from [facility] Patient Assistance Program will be contacting you.    Follow up in 4 weeks.

## 2024-11-08 NOTE — LETTER
November 8, 2024    Billy Sheffield Miguel  [address] [address]             Elier Mendoza Billy Sheffield Miguel     It was a pleasure speaking with you. To follow up on our conversation on 11/8/2024, the Pharmacy Patient Assistance Program needs more information from you before we can submit your Humalog, Lantus Pens, Novolog Pens, and Ozempic 0.5mg application to the [employer], [employer] Nordisk, and [employer] Program. Please return the following documents to the Pharmacy Patient Assistance office ASAP.  Fax requested documentation to [phone] or email [email or web address]. Documentation can also be mailed to address listed below       Proof of household Income( such as social security statement, 1099 form, pension statement or 3 consecutive pay stubs  Copy of all Insurance cards( front and back)  Completed Medication Access Center Authorization Forms             Whats Next:     Once I receive your documentation and authorization from your Provider, your application will be submitted to the respective Assistance Program for review. Please be advised it will take 2 to 4 weeks for your application to be processed so you may have to purchase a month's supply of medication from your pharmacy to hold you over during the waiting period. You will be notified of approval or denial by The Program(mail) or myself.      If you have any questions or concerns, please give me a call.         Sincerely   Sergio PETERS @[phone]  Pharmacy Patient Assistance  [id number]  [place] [address]  Fax: [phone]  Email: [email or web address]

## 2024-11-08 NOTE — PROGRESS NOTES
CHIEF COMPLAINT: Type 2 Diabetes    Referred by PCP: Dr. Ruiz  Previously managed by: Akua Powell CDE person of contact (if needed): Joanne Hooker     HPI: Mr. Billy Sheffield Miguel is a 59 y.o. male who was diagnosed with Type 2 DM 15 years ago.   Has Medtronic 780g pump with Novolin R. Reports no longer able to afford pump supplies due to cost. Would like to re-start his previous insulin regimen along with Ozempic. Has orthopedic boot to RLE, see podiatry and attends wound care. Reports scheduled for sx to right foot on 12/6/2024. BP elevated, reports work nights and has not taken his meds yet. Reports non-FBG this am 210. Declined CGM, phone not compatible. Past due for DM labs.    BG monitoring:   Name: [employer]ion Glucometer  How often: PRN  BG range:   Hypoglycemia: No      Hx of hospitalization for DM? No   Hx of HTN? Yes  Hx of CKD? No   Hx of CHF? No   Hx of CVA? No   Hx of MI? Yes   Hx of Pancreatitis? 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 Yes, exam UTD.  Hx of Long term steroid use? No        PREVIOUS DIABETES MEDICATIONS TRIED  Ozempic- cost  Lantus- cost   Fiasp-cost  Novolog / Humalog / Lispro - cost  Metformin- GI upset      CURRENT DIABETIC MEDS:   Novolin-R via  insulin pump  Does not count carbs; administers 15u (B,L,D), and 5u (snacks), and 10u (other).      Diabetes Management Status:  Statin: Taking  ACE/ARB: Taking    Screening or Prevention Patient's value Goal Complete/Controlled?   HgA1C Testing and Control   Lab Results   Component Value Date    HGBA1C 8.7 (H) 07/20/2023      Annually/Less than 8% No   Lipid profile : 07/20/2023 Annually No   LDL control Lab Results   Component Value Date    LDLCALC 71.0 07/20/2023    Annually/Less than 100 mg/dl  No   Nephropathy screening Lab Results   Component Value Date    LABMICR 229.0 07/20/2023     Lab Results   Component Value Date    PROTEINUA 2+ (A) 01/26/2022    Annually No   Blood pressure BP Readings from Last 1 Encounters:   11/08/24 (!)  "170/84    Less than 140/90 No   Dilated retinal exam : 10/15/2024 Annually No   Foot exam   Deferred Annually Yes     REVIEW OF SYSTEMS  General: Denies weakness, fatigue, or weight changes.   Eyes: Denies double or blurred vision, eye pain, or redness.  Cardiovascular: Denies CP, palpitations, or edema.   Respiratory: Denies cough or dyspnea.   GI: Denies heartburn, n/v, or changes in bowel patterns.   Skin: Denies rash, lesions, itching, or injection site problems.  Neuro: Denies severe Has, numbness, tingling, tremors, or vertigo.   Endocrine: Denies polyuria, polydipsia, polyphagia, heat or cold intolerance.     Vital Signs  BP (!) 170/84 (BP Location: Right arm, Patient Position: Sitting)   Pulse 70   Ht 6' 2" (1.88 m)   Wt 133.8 kg (294 lb 15.6 oz)   SpO2 97%   BMI 37.87 kg/m²     Hemoglobin A1C   Date Value Ref Range Status   07/20/2023 8.7 (H) 4.0 - 5.6 % Final     Comment:     ADA Screening Guidelines:  5.7-6.4%  Consistent with prediabetes  >or=6.5%  Consistent with diabetes    High levels of fetal hemoglobin interfere with the HbA1C  assay. Heterozygous hemoglobin variants (HbS, HgC, etc)do  not significantly interfere with this assay.   However, presence of multiple variants may affect accuracy.     04/06/2023 9.4 (H) 4.0 - 5.6 % Final     Comment:     ADA Screening Guidelines:  5.7-6.4%  Consistent with prediabetes  >or=6.5%  Consistent with diabetes    High levels of fetal hemoglobin interfere with the HbA1C  assay. Heterozygous hemoglobin variants (HbS, HgC, etc)do  not significantly interfere with this assay.   However, presence of multiple variants may affect accuracy.     11/10/2022 7.1 (H) 4.0 - 5.6 % Final     Comment:     ADA Screening Guidelines:  5.7-6.4%  Consistent with prediabetes  >or=6.5%  Consistent with diabetes    High levels of fetal hemoglobin interfere with the HbA1C  assay. Heterozygous hemoglobin variants (HbS, HgC, etc)do  not significantly interfere with this assay. "   However, presence of multiple variants may affect accuracy.          Chemistry        Component Value Date/Time     (L) 03/20/2024 0742    K 4.7 03/20/2024 0742    CL 97 03/20/2024 0742    CO2 28 03/20/2024 0742    BUN 20 03/20/2024 0742    CREATININE 1.4 03/20/2024 0742     (H) 03/20/2024 0742        Component Value Date/Time    CALCIUM 9.6 03/20/2024 0742    ALKPHOS 110 03/20/2024 0742    AST 26 03/20/2024 0742    ALT 43 03/20/2024 0742    BILITOT 0.5 03/20/2024 0742    ESTGFRAFRICA >60.0 06/10/2022 0246    EGFRNONAA >60.0 06/10/2022 0246           Lab Results   Component Value Date    TSH 1.990 01/26/2022      Lab Results   Component Value Date    CHOL 135 07/20/2023    CHOL 151 04/06/2023    CHOL 121 11/10/2022     Lab Results   Component Value Date    HDL 39 (L) 07/20/2023    HDL 35 (L) 04/06/2023    HDL 34 (L) 11/10/2022     Lab Results   Component Value Date    LDLCALC 71.0 07/20/2023    LDLCALC 89.2 04/06/2023    LDLCALC 57.4 (L) 11/10/2022     Lab Results   Component Value Date    TRIG 125 07/20/2023    TRIG 134 04/06/2023    TRIG 148 11/10/2022     Lab Results   Component Value Date    CHOLHDL 28.9 07/20/2023    CHOLHDL 23.2 04/06/2023    CHOLHDL 28.1 11/10/2022         PHYSICAL EXAMINATION  Constitutional: Appears well, no distress.  Eyes: Sclera white, PERRLA, EOMs intact.  Neck: Supple, trachea midline.   Respiratory: No cough, SOB, nor HAIDER.  Cardiovascular: RRR; positive edema to RLE.  Skin: Warm and dry; no rash, lesions, acanthosis nigricans, nor injection site reactions.  Neuro: AAOx4, steady gait  Psychiatric: No unusual, disruptive, or inappropriate behavior.     Diabetes Foot Exam: deferred      Assessment & Plan   1. Type 2 diabetes mellitus with hyperglycemia, with long-term current use of insulin  Ambulatory referral/consult to Pharmacy Assistance    Update Hemoglobin A1C    Update Lipid Panel    Update Comprehensive Metabolic Panel    Update Microalbumin/creatinine urine  ratio    -Discussed lifestyle changes.    -Stop insulin pump once current insulin (Novolin-R) runs out.    -Start Novolin 70/30  (36 units before breakfast and 30 units before dinner).    -Start POC at least 3 times daily. Bring BG log and glucometer to every visit.    -Re-start Ozempic, Lantus, and Novolog/Humalog pending coverage.     -Will discuss starting CGM once again upon next visit.    -Review lab results upon next visit (4w).      2. 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 Patient to submit a copy of his eye exam report.      3. Type 2 diabetes mellitus with diabetic peripheral angiopathy without gangrene, with long-term current use of insulin  -See podiatry and attends wound care.  -Orthopedic boot to right foot.   -Scheduled for sx to right foot on 12/6/2024.       4. Hypertension associated with diabetes  Update Microalbumin/creatinine urine ratio  -Cont Amlodipine and Lisinopril.  -Discussed importance of taking BP meds.  -Review labs upon next visit (4w).        5. Dyslipidemia associated with type 2 diabetes mellitus  Update Lipid Panel  -Discussed lifestyle changes.  -Cont Atorvastatin.  -Review labs upon next visit (4w).          FOLLOW UP  Follow up in about 4 weeks (around 12/6/2024).

## 2024-11-11 ENCOUNTER — PATIENT MESSAGE (OUTPATIENT)
Dept: PODIATRY | Facility: CLINIC | Age: 59
End: 2024-11-11
Payer: COMMERCIAL

## 2024-11-19 NOTE — PROGRESS NOTES
Subjective:      Patient ID: Billy Rivera is a 59 y.o. male.    Chief Complaint:  Follow-up wound care, right foot.      Billy is a 59 y.o. male who presents to the clinic for evaluation and treatment of high risk feet. Billy has a past medical history of Allergy, CAD (coronary artery disease), native coronary artery (6/25/2013), Cancer, COVID-19 virus detected (9/1/2020), Diabetes mellitus, Diabetes mellitus, type 2, Disorder of kidney and ureter, Heart attack (04/2012), colon cancer, stage I, Hyperlipidemia, Hypertension, Muscular pain, NSTEMI May 2013 - peak troponin 0.22 (5/22/2013), MICHAELA (obstructive sleep apnea), and Retinopathy due to secondary diabetes.  New issue ulceration/blister right foot. Improving.  Scheduled for metatarsal head resection last visit.      PCP: Augie Ruiz MD    Date Last Seen by PCP:   Chief Complaint   Patient presents with    Wound Care     History of Trauma: negative  Sign of Infection: none    Hemoglobin A1C   Date Value Ref Range Status   07/20/2023 8.7 (H) 4.0 - 5.6 % Final     Comment:     ADA Screening Guidelines:  5.7-6.4%  Consistent with prediabetes  >or=6.5%  Consistent with diabetes    High levels of fetal hemoglobin interfere with the HbA1C  assay. Heterozygous hemoglobin variants (HbS, HgC, etc)do  not significantly interfere with this assay.   However, presence of multiple variants may affect accuracy.     04/06/2023 9.4 (H) 4.0 - 5.6 % Final     Comment:     ADA Screening Guidelines:  5.7-6.4%  Consistent with prediabetes  >or=6.5%  Consistent with diabetes    High levels of fetal hemoglobin interfere with the HbA1C  assay. Heterozygous hemoglobin variants (HbS, HgC, etc)do  not significantly interfere with this assay.   However, presence of multiple variants may affect accuracy.     11/10/2022 7.1 (H) 4.0 - 5.6 % Final     Comment:     ADA Screening Guidelines:  5.7-6.4%  Consistent with prediabetes  >or=6.5%  Consistent with diabetes    High  "levels of fetal hemoglobin interfere with the HbA1C  assay. Heterozygous hemoglobin variants (HbS, HgC, etc)do  not significantly interfere with this assay.   However, presence of multiple variants may affect accuracy.                 Objective:      Vitals:    11/05/24 1401   BP: 132/77   Pulse: (!) 58   Weight: 130 kg (286 lb 9.6 oz)   Height: 6' 2" (1.88 m)   PainSc: 0-No pain        Physical Exam  Constitutional:       General: He is not in acute distress.     Appearance: Normal appearance. He is well-developed and normal weight.   HENT:      Head: Normocephalic and atraumatic.   Cardiovascular:      Pulses:           Dorsalis pedis pulses are 2+ on the right side and 2+ on the left side.        Posterior tibial pulses are 1+ on the right side and 1+ on the left side.      Comments: CFT< 3 secs all toes bilateral foot, skin temp warm to cool bilateral foot, no hair growth bilateral lower extremity, no edema to bilateral lower extremities    Pulmonary:      Effort: Pulmonary effort is normal.   Musculoskeletal:         General: No swelling.      Right foot: Normal range of motion. Deformity present. No foot drop.      Left foot: Normal range of motion. Deformity present. No foot drop.      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     5/5 muscle strength with DF/PF/Inv/Ev bilateral.       Feet:      Right foot:      Protective Sensation: 5 sites tested.  0 sites sensed.      Skin integrity: Ulcer, skin breakdown, callus and dry skin present. No erythema.      Toenail Condition: Right toenails are abnormally thick.      Left foot:      Protective Sensation: 5 sites tested.  0 sites sensed.      Skin integrity: Callus and dry skin present. No ulcer, skin breakdown or erythema.      Toenail Condition: Left toenails are abnormally thick.   Skin:     General: Skin is warm and dry.      Capillary Refill: Capillary refill takes 2 to 3 seconds.      Findings: No erythema.    "   Nails: There is no clubbing.      Comments: Ulceration location:  Right ulceration sub 2nd MPJ noted to be healed.   Neurological:      Mental Status: He is alert and oriented to person, place, and time.      Sensory: Sensory deficit present.      Motor: No weakness.      Deep Tendon Reflexes:      Reflex Scores:       Patellar reflexes are 2+ on the right side and 2+ on the left side.       Achilles reflexes are 2+ on the right side and 2+ on the left side.     Comments: Diminished/loss of protective sensation all toes bilateral to 10 gram monofilament.   Psychiatric:         Mood and Affect: Mood normal.         Behavior: Behavior normal.         Thought Content: Thought content normal.       10/30:  Plantar R foot wound with hyperkeratotic periwound.  Negative probe to bone.  No signs of infection.        Encounter Diagnoses   Name Primary?    Type II diabetes mellitus with neurological manifestations Yes    Foot ulcer, right, with fat layer exposed     Metatarsal deformity, right          Plan:       Billy was seen today for wound care.    Diagnoses and all orders for this visit:    Type II diabetes mellitus with neurological manifestations    Foot ulcer, right, with fat layer exposed    Metatarsal deformity, right    Other orders  -     Wound Debridement        Independent review of patients previous clinical notes    Wound debrided to healthy tissue.  Full procedure note below.    Reviewed current medication(s), and lab(s) specific to foot ailment(s) with patient in detail. All questions answered     The nature of the condition, options for management, as well as potential risks and complications were discussed in detail with patient. Patient was amenable to my recommendations and left my office fully informed and will follow up as instructed or sooner if necessary.      Shoe inspection. Diabetic Foot Education. Patient reminded of the importance of good nutrition and blood sugar control to help prevent  podiatric complications of diabetes. Patient instructed on proper foot hygeine. We discussed wearing proper shoe gear, daily foot inspections and Diabetic foot education in detail.    Luis Coelho DPM   Podiatric Medicine & Surgery  [facility]    Procedures

## 2024-11-20 ENCOUNTER — OFFICE VISIT (OUTPATIENT)
Dept: PODIATRY | Facility: CLINIC | Age: 59
End: 2024-11-20
Payer: COMMERCIAL

## 2024-11-20 VITALS
HEIGHT: 74 IN | WEIGHT: 293.19 LBS | SYSTOLIC BLOOD PRESSURE: 146 MMHG | DIASTOLIC BLOOD PRESSURE: 81 MMHG | BODY MASS INDEX: 37.63 KG/M2 | HEART RATE: 67 BPM

## 2024-11-20 DIAGNOSIS — M21.961 METATARSAL DEFORMITY, RIGHT: ICD-10-CM

## 2024-11-20 DIAGNOSIS — L97.512 FOOT ULCER, RIGHT, WITH FAT LAYER EXPOSED: ICD-10-CM

## 2024-11-20 DIAGNOSIS — E11.49 TYPE II DIABETES MELLITUS WITH NEUROLOGICAL MANIFESTATIONS: Primary | ICD-10-CM

## 2024-11-20 PROCEDURE — 1159F MED LIST DOCD IN RCRD: CPT | Mod: CPTII,S$GLB,, | Performed by: PODIATRIST

## 2024-11-20 PROCEDURE — 1160F RVW MEDS BY RX/DR IN RCRD: CPT | Mod: CPTII,S$GLB,, | Performed by: PODIATRIST

## 2024-11-20 PROCEDURE — 99999 PR PBB SHADOW E&M-EST. PATIENT-LVL IV: CPT | Mod: PBBFAC,,, | Performed by: PODIATRIST

## 2024-11-20 PROCEDURE — 4010F ACE/ARB THERAPY RXD/TAKEN: CPT | Mod: CPTII,S$GLB,, | Performed by: PODIATRIST

## 2024-11-20 PROCEDURE — 99213 OFFICE O/P EST LOW 20 MIN: CPT | Mod: S$GLB,,, | Performed by: PODIATRIST

## 2024-11-20 PROCEDURE — 3008F BODY MASS INDEX DOCD: CPT | Mod: CPTII,S$GLB,, | Performed by: PODIATRIST

## 2024-11-20 PROCEDURE — 3079F DIAST BP 80-89 MM HG: CPT | Mod: CPTII,S$GLB,, | Performed by: PODIATRIST

## 2024-11-20 PROCEDURE — 3077F SYST BP >= 140 MM HG: CPT | Mod: CPTII,S$GLB,, | Performed by: PODIATRIST

## 2024-11-21 NOTE — H&P (VIEW-ONLY)
Subjective:      Patient ID: Billy Rivera is a 59 y.o. male.    Chief Complaint:  Follow-up wound care, right foot.      Billy is a 59 y.o. male who presents to the clinic for evaluation and treatment of high risk feet. Billy has a past medical history of Allergy, CAD (coronary artery disease), native coronary artery (6/25/2013), Cancer, COVID-19 virus detected (9/1/2020), Diabetes mellitus, Diabetes mellitus, type 2, Disorder of kidney and ureter, Heart attack (04/2012), colon cancer, stage I, Hyperlipidemia, Hypertension, Muscular pain, NSTEMI May 2013 - peak troponin 0.22 (5/22/2013), MICHAELA (obstructive sleep apnea), and Retinopathy due to secondary diabetes.  New issue ulceration/blister right foot. Improving.  Scheduled for metatarsal head resection last visit.      PCP: Augie Ruiz MD    Date Last Seen by PCP:   No chief complaint on file.    History of Trauma: negative  Sign of Infection: none    Hemoglobin A1C   Date Value Ref Range Status   07/20/2023 8.7 (H) 4.0 - 5.6 % Final     Comment:     ADA Screening Guidelines:  5.7-6.4%  Consistent with prediabetes  >or=6.5%  Consistent with diabetes    High levels of fetal hemoglobin interfere with the HbA1C  assay. Heterozygous hemoglobin variants (HbS, HgC, etc)do  not significantly interfere with this assay.   However, presence of multiple variants may affect accuracy.     04/06/2023 9.4 (H) 4.0 - 5.6 % Final     Comment:     ADA Screening Guidelines:  5.7-6.4%  Consistent with prediabetes  >or=6.5%  Consistent with diabetes    High levels of fetal hemoglobin interfere with the HbA1C  assay. Heterozygous hemoglobin variants (HbS, HgC, etc)do  not significantly interfere with this assay.   However, presence of multiple variants may affect accuracy.     11/10/2022 7.1 (H) 4.0 - 5.6 % Final     Comment:     ADA Screening Guidelines:  5.7-6.4%  Consistent with prediabetes  >or=6.5%  Consistent with diabetes    High levels of fetal hemoglobin  "interfere with the HbA1C  assay. Heterozygous hemoglobin variants (HbS, HgC, etc)do  not significantly interfere with this assay.   However, presence of multiple variants may affect accuracy.                 Objective:      Vitals:    11/20/24 0827   BP: (!) 146/81   Pulse: 67   Weight: 133 kg (293 lb 3.4 oz)   Height: 6' 2" (1.88 m)   PainSc: 0-No pain        Physical Exam  Constitutional:       General: He is not in acute distress.     Appearance: Normal appearance. He is well-developed and normal weight.   HENT:      Head: Normocephalic and atraumatic.   Cardiovascular:      Pulses:           Dorsalis pedis pulses are 2+ on the right side and 2+ on the left side.        Posterior tibial pulses are 1+ on the right side and 1+ on the left side.      Comments: CFT< 3 secs all toes bilateral foot, skin temp warm to cool bilateral foot, no hair growth bilateral lower extremity, no edema to bilateral lower extremities    Pulmonary:      Effort: Pulmonary effort is normal.   Musculoskeletal:         General: No swelling.      Right foot: Normal range of motion. Deformity present. No foot drop.      Left foot: Normal range of motion. Deformity present. No foot drop.      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     5/5 muscle strength with DF/PF/Inv/Ev bilateral.       Feet:      Right foot:      Protective Sensation: 5 sites tested.  0 sites sensed.      Skin integrity: Ulcer, skin breakdown, callus and dry skin present. No erythema.      Toenail Condition: Right toenails are abnormally thick.      Left foot:      Protective Sensation: 5 sites tested.  0 sites sensed.      Skin integrity: Callus and dry skin present. No ulcer, skin breakdown or erythema.      Toenail Condition: Left toenails are abnormally thick.   Skin:     General: Skin is warm and dry.      Capillary Refill: Capillary refill takes 2 to 3 seconds.      Findings: No erythema.      Nails: There is no " clubbing.      Comments: Ulceration location:  Right ulceration sub 2nd MPJ noted to be healed.   Neurological:      Mental Status: He is alert and oriented to person, place, and time.      Sensory: Sensory deficit present.      Motor: No weakness.      Deep Tendon Reflexes:      Reflex Scores:       Patellar reflexes are 2+ on the right side and 2+ on the left side.       Achilles reflexes are 2+ on the right side and 2+ on the left side.     Comments: Diminished/loss of protective sensation all toes bilateral to 10 gram monofilament.   Psychiatric:         Mood and Affect: Mood normal.         Behavior: Behavior normal.         Thought Content: Thought content normal.         10/30:  Plantar R foot wound with hyperkeratotic periwound.  Negative probe to bone.  No signs of infection.        Encounter Diagnoses   Name Primary?    Type II diabetes mellitus with neurological manifestations Yes    Foot ulcer, right, with fat layer exposed     Metatarsal deformity, right          Plan:       Diagnoses and all orders for this visit:    Type II diabetes mellitus with neurological manifestations    Foot ulcer, right, with fat layer exposed    Metatarsal deformity, right        Independent review of patients previous clinical notes    Wound debrided to healthy tissue.  Full procedure note below.    Reviewed current medication(s), and lab(s) specific to foot ailment(s) with patient in detail. All questions answered     The nature of the condition, options for management, as well as potential risks and complications were discussed in detail with patient. Patient was amenable to my recommendations and left my office fully informed and will follow up as instructed or sooner if necessary.      Follow-up for surgery/metatarsal head resection.

## 2024-11-25 NOTE — ANESTHESIA PAT ROS NOTE
11/25/2024  Billy Sheffield Miguel is a 59 y.o., male.      Pre-op Assessment    I have reviewed the Patient Summary Reports.     I have reviewed the Nursing Notes.    I have reviewed the Medications.     Review of Systems  Anesthesia Hx:  No problems with previous Anesthesia                Cardiovascular:     Hypertension   CAD                                          Pulmonary:        Sleep Apnea                Endocrine:        Obesity / BMI > 30           Anesthesia Assessment: Preoperative EQUATION    Planned Procedure: Procedure(s) (LRB):  EXCISION,TARSAL OR METATARSAL BONE,EXCLUDING TALUS OR CALCANEUS,PARTIAL (Right)  Requested Anesthesia Type:Local MAC  Surgeon: Mingo Melara DPM  Service: Podiatry  Known or anticipated Date of Surgery:12/6/2024    Surgeon notes: reviewed    Electronic QUestionnaire Assessment completed via nurse interview with patient.        Triage considerations:     The patient has no apparent active cardiac condition (No unstable coronary Syndrome such as severe unstable angina or recent [<1 month] myocardial infarction, decompensated CHF, severe valvular   disease or significant arrhythmia)    Previous anesthesia records:[facility]    Last PCP note: within 1 month , within [facility]   Subspecialty notes: n/a    Other important co-morbidities: HTN, Obesity, and MICHAELA      Tests already available:  No recent tests.             Instructions given. (See in Nurse's note)    Optimization:  Anesthesia Preop Clinic Assessment  Indicated    Medical Opinion Indicated       Sub-specialist consult indicated:   TBD       Plan:    Testing:  CBC and CMP   Pre-anesthesia  visit       Visit focus: airway concerns     Consultation:Patient's PCP for a statement of optimization      Patient  has previously scheduled Medical Appointment:    Navigation: Tests Scheduled.              Consults  scheduled.             Results will be tracked by Preop Clinic.      - Billy Rivera is cleared to proceed with surgery              Pt reviewed by ROSA MARIA TURPIN. OK to proceed at [facility], pending pre-op clearance. Concerns for MICHAELA.

## 2024-11-26 NOTE — PROGRESS NOTES
Subjective:    The following note was written in combination with deep-scribe software and dictation.      Chief Complaint: Pre-op Exam (Right foot surgery 12-6) and Warts (Left index finger)    HPI  Mr. Billy Rivera is a 59-year-old man with poorly controlled type 2 diabetes (metformin 1000 b.i.d. and insulin via pump; formerly followed by our in office diabetic nurse practitioner), coronary artery disease, hyperlipidemia (atorvastatin 80), hypertension (lisinopril 40, amlodipine 10, and carvedilol 6.25 b.i.d.), history of osteomyelitis (bilateral feet; status post bilateral toe amputations), history of colorectal cancer (stage I; 2019), etc. presenting for preoperative assessment:     Preop:   - 2nd metatarsal head resection right foot scheduled (12/6) between 3-5 p.m.  -Revised Cardiac Risk Index for Pre-Operative Risk Score of 10.1%  -Can take care of self, such as eat, dress, or use the toilet (1 MET): YES  -Can walk up a flight of steps or a hill or walk on level ground at 3 to 4 mph (4 METs): YES  -Can do heavy work around the house, such as scrubbing floors or lifting or moving heavy  furniture, or climb two flights of stairs (between 4 and 10 METs): YES    DM:  - currently maintained on 70/30 (36 qAM and 30qhs) and metformin 1000 b.i.d.; other long-acting insulins, insulin pump, and Ozempic were unaffordable  -recently reestablish with our in office diabetic specialist   - last A1c:8.7 (albeit over 1 year ago; did not have A1c ordered at annual physical six-month ago drawn and A1c drawn earlier this morning has not yet been run).    Common work:  - requesting removal of or to lateral left 2nd digit as per picture below        Review of Systems   Constitutional:  Negative for appetite change, chills and fever.   HENT: Negative.     Respiratory:  Negative for cough, chest tightness and shortness of breath.    Cardiovascular:  Negative for chest pain, palpitations and leg swelling.   Gastrointestinal:   Negative for abdominal distention, abdominal pain, blood in stool, constipation, diarrhea, nausea and vomiting.   Endocrine: Negative.    Genitourinary:  Negative for difficulty urinating, dysuria, frequency and hematuria.   Musculoskeletal: Negative.    Integumentary:  Negative.   Neurological: Negative.    Psychiatric/Behavioral: Negative.           Objective:      Vitals:    11/27/24 0809   BP: 138/80   Pulse: 64   Resp: 18   Temp: 97.4 °F (36.3 °C)      Physical Exam  Vitals reviewed.   Constitutional:       General: He is not in acute distress.     Appearance: Normal appearance.   HENT:      Head: Normocephalic and atraumatic.      Comments: Facial features are symmetric      Nose: Nose normal. No congestion or rhinorrhea.      Mouth/Throat:      Mouth: Mucous membranes are moist.      Pharynx: Oropharynx is clear. No oropharyngeal exudate or posterior oropharyngeal erythema.   Eyes:      General: No scleral icterus.     Extraocular Movements: Extraocular movements intact.      Conjunctiva/sclera: Conjunctivae normal.   Cardiovascular:      Rate and Rhythm: Normal rate and regular rhythm.      Pulses: Normal pulses.      Heart sounds: Normal heart sounds.   Pulmonary:      Effort: Pulmonary effort is normal. No respiratory distress.      Breath sounds: Normal breath sounds.   Musculoskeletal:         General: No deformity or signs of injury. Normal range of motion.      Cervical back: Normal range of motion.      Comments: Gait normal    Skin:     General: Skin is warm and dry.      Findings: No rash.   Neurological:      General: No focal deficit present.      Mental Status: He is alert and oriented to person, place, and time. Mental status is at baseline.   Psychiatric:         Mood and Affect: Mood normal.         Behavior: Behavior normal.         Thought Content: Thought content normal.       Current Outpatient Medications on File Prior to Visit   Medication Sig Dispense Refill    acetaminophen (TYLENOL)  500 MG tablet Take 1,000 mg by mouth daily as needed for Pain.      amLODIPine (NORVASC) 10 MG tablet Take 1 tablet (10 mg total) by mouth once daily. 90 tablet 3    aspirin (ECOTRIN) 81 MG EC tablet Take 1 tablet (81 mg total) by mouth once daily. 90 tablet 3    atorvastatin (LIPITOR) 80 MG tablet Take 1 tablet (80 mg total) by mouth every evening. 30 tablet 6    bismuth subsalicylate (PEPTO BISMOL) 262 mg/15 mL suspension Take 15 mLs by mouth daily as needed (diarrhea).      blood sugar diagnostic (ACCU-CHEK GUIDE TEST STRIPS) Strp 1 each by Misc.(Non-Drug; Combo Route) route 5 (five) times daily. 150 each 11    blood-glucose sensor Haley Inject 1 each into the skin every 7 days. 12 each 3    carvediloL (COREG) 12.5 MG tablet Take 0.5 tablets (6.25 mg total) by mouth 2 (two) times daily with meals. 90 tablet 3    insulin aspart, niacinamide, (FIASP U-100 INSULIN) 100 unit/mL Soln Inject 100 Units into the skin continuous. Gives via insulin pump up to 100 units daily. Do not directly inject. Uses as directed. Rate is 1.75 units/hour - carb counts as well for meal time boluses. 60 mL 6    insulin NPH/Reg human (NOVOLIN 70-30 FLEXPEN U-100) 100 unit/mL (70-30) InPn pen Inject 36 units before breakfast and 30 units before dinner 15 mL 3    lancets (ACCU-CHEK FASTCLIX LANCET DRUM) Misc 1 each by Misc.(Non-Drug; Combo Route) route Daily. 30 each 11    lisinopriL (PRINIVIL,ZESTRIL) 40 MG tablet Take 1 tablet (40 mg total) by mouth once daily. 90 tablet 3    metFORMIN (GLUCOPHAGE) 1000 MG tablet Take 1 tablet (1,000 mg total) by mouth 2 (two) times daily with meals. 180 tablet 3    multivit-min/folic/vit K/lycop (MEN'S MULTIVITAMIN ORAL) Take 1 tablet by mouth once daily.      atorvastatin (LIPITOR) 80 MG tablet Take 80 mg by mouth.      carvediloL (COREG) 12.5 MG tablet Take 12.5 mg by mouth.      doxycycline (VIBRAMYCIN) 100 MG Cap Take 1 capsule (100 mg total) by mouth 2 (two) times daily. 20 capsule 0    insulin lispro  "100 unit/mL pen Inject into the skin.      insulin regular 100 unit/mL Inj injection Inject 100 Units into the skin once daily. Inject up to 100u via insulin pump daily      levoFLOXacin (LEVAQUIN) 750 MG tablet Take 1 tablet (750 mg total) by mouth once daily. 10 tablet 0    multivitamin (MULTIPLE VITAMIN ORAL) Take 1 capsule by mouth.      pen needle, diabetic 31 gauge x 3/16" Ndle Inject 1 each into the skin 4 (four) times daily. (Patient not taking: Reported on 11/27/2024) 100 each 11    semaglutide (OZEMPIC) 0.25 mg or 0.5 mg (2 mg/3 mL) pen injector Inject 0.5 mg into the skin every 7 days. (Patient not taking: Reported on 11/27/2024) 3 mL 6    semaglutide (OZEMPIC) 0.25 mg or 0.5 mg (2 mg/3 mL) pen injector Inject 0.5 mg into the skin. (Patient not taking: Reported on 11/27/2024)      subcutaneous insulin pump (MINIMED 780G INSULIN PUMP) Misc 1 each by Misc.(Non-Drug; Combo Route) route once daily. (Patient not taking: Reported on 11/27/2024) 1 each 0     No current facility-administered medications on file prior to visit.         Assessment:       1. Preop examination    2. Common wart    3. Type 2 diabetes mellitus with diabetic peripheral angiopathy without gangrene, with long-term current use of insulin        Plan:       Preop examination   - the patient is personally low risk preoperatively for a general low risk procedure; that said, understanding was expressed that there is an ever present/irreducible operative risk (10.1 % calculated) which was found acceptable; the patient wishes to proceed   - medical management:  Hold ASA 5 days prior to procedure and metformin morning of.  Take 1/2 of a.m. 70/30 insulin dose and all cholesterol/antihypertensive medicines as currently prescribed.  Hold all over-the-counter supplements/multivitamins morning of.   - Billy Rivera is cleared to proceed with surgery    Common wart  -     Ambulatory referral/consult to Dermatology; Future; Expected date: 12/04/2024   - " will facilitate dermatology establishment for consideration of cryotherapy; recommendations and interventions appreciated     Type 2 diabetes mellitus with diabetic peripheral angiopathy without gangrene, with long-term current use of insulin  - will facilitate immediate follow-up with recently reestablish diabetic nurse practitioner for assistance with insulin management and surgical A1c goal (presumed to be higher than 8.7 at last check due to reported average between 200 and 250).

## 2024-11-27 ENCOUNTER — OFFICE VISIT (OUTPATIENT)
Dept: INTERNAL MEDICINE | Facility: CLINIC | Age: 59
End: 2024-11-27
Payer: COMMERCIAL

## 2024-11-27 ENCOUNTER — LAB VISIT (OUTPATIENT)
Dept: LAB | Facility: HOSPITAL | Age: 59
End: 2024-11-27
Payer: COMMERCIAL

## 2024-11-27 VITALS
BODY MASS INDEX: 36.57 KG/M2 | SYSTOLIC BLOOD PRESSURE: 138 MMHG | WEIGHT: 284.81 LBS | HEART RATE: 64 BPM | DIASTOLIC BLOOD PRESSURE: 80 MMHG | OXYGEN SATURATION: 98 % | SYSTOLIC BLOOD PRESSURE: 138 MMHG | OXYGEN SATURATION: 98 % | HEIGHT: 74 IN | TEMPERATURE: 97 F | HEART RATE: 64 BPM | BODY MASS INDEX: 36.55 KG/M2 | RESPIRATION RATE: 18 BRPM | WEIGHT: 284.81 LBS | DIASTOLIC BLOOD PRESSURE: 80 MMHG

## 2024-11-27 DIAGNOSIS — Z79.4 TYPE 2 DIABETES MELLITUS WITH HYPERGLYCEMIA, WITH LONG-TERM CURRENT USE OF INSULIN: ICD-10-CM

## 2024-11-27 DIAGNOSIS — Z79.4 TYPE 2 DIABETES MELLITUS WITH DIABETIC PERIPHERAL ANGIOPATHY WITHOUT GANGRENE, WITH LONG-TERM CURRENT USE OF INSULIN: Chronic | ICD-10-CM

## 2024-11-27 DIAGNOSIS — E11.51 TYPE 2 DIABETES MELLITUS WITH DIABETIC PERIPHERAL ANGIOPATHY WITHOUT GANGRENE, WITH LONG-TERM CURRENT USE OF INSULIN: Chronic | ICD-10-CM

## 2024-11-27 DIAGNOSIS — B07.8 COMMON WART: ICD-10-CM

## 2024-11-27 DIAGNOSIS — E11.65 TYPE 2 DIABETES MELLITUS WITH HYPERGLYCEMIA, WITH LONG-TERM CURRENT USE OF INSULIN: ICD-10-CM

## 2024-11-27 DIAGNOSIS — E11.69 DYSLIPIDEMIA ASSOCIATED WITH TYPE 2 DIABETES MELLITUS: ICD-10-CM

## 2024-11-27 DIAGNOSIS — Z79.4 TYPE 2 DIABETES MELLITUS WITH HYPERGLYCEMIA, WITH LONG-TERM CURRENT USE OF INSULIN: Primary | ICD-10-CM

## 2024-11-27 DIAGNOSIS — E78.5 DYSLIPIDEMIA ASSOCIATED WITH TYPE 2 DIABETES MELLITUS: ICD-10-CM

## 2024-11-27 DIAGNOSIS — E11.65 TYPE 2 DIABETES MELLITUS WITH HYPERGLYCEMIA, WITH LONG-TERM CURRENT USE OF INSULIN: Primary | ICD-10-CM

## 2024-11-27 DIAGNOSIS — E11.59 HYPERTENSION ASSOCIATED WITH DIABETES: Chronic | ICD-10-CM

## 2024-11-27 DIAGNOSIS — Z01.818 PREOP EXAMINATION: Primary | ICD-10-CM

## 2024-11-27 DIAGNOSIS — I15.2 HYPERTENSION ASSOCIATED WITH DIABETES: Chronic | ICD-10-CM

## 2024-11-27 PROBLEM — Z96.41 INSULIN PUMP IN PLACE: Status: RESOLVED | Noted: 2022-02-23 | Resolved: 2024-11-27

## 2024-11-27 LAB
ALBUMIN SERPL BCP-MCNC: 3.4 G/DL (ref 3.5–5.2)
ALP SERPL-CCNC: 108 U/L (ref 40–150)
ALT SERPL W/O P-5'-P-CCNC: 54 U/L (ref 10–44)
ANION GAP SERPL CALC-SCNC: 9 MMOL/L (ref 8–16)
AST SERPL-CCNC: 25 U/L (ref 10–40)
BILIRUB SERPL-MCNC: 0.6 MG/DL (ref 0.1–1)
BUN SERPL-MCNC: 16 MG/DL (ref 6–20)
CALCIUM SERPL-MCNC: 9 MG/DL (ref 8.7–10.5)
CHLORIDE SERPL-SCNC: 100 MMOL/L (ref 95–110)
CHOLEST SERPL-MCNC: 220 MG/DL (ref 120–199)
CHOLEST/HDLC SERPL: 6.3 {RATIO} (ref 2–5)
CO2 SERPL-SCNC: 25 MMOL/L (ref 23–29)
CREAT SERPL-MCNC: 1 MG/DL (ref 0.5–1.4)
EST. GFR  (NO RACE VARIABLE): >60 ML/MIN/1.73 M^2
ESTIMATED AVG GLUCOSE: 246 MG/DL (ref 68–131)
GLUCOSE SERPL-MCNC: 266 MG/DL (ref 70–110)
HBA1C MFR BLD: 10.2 % (ref 4–5.6)
HDLC SERPL-MCNC: 35 MG/DL (ref 40–75)
HDLC SERPL: 15.9 % (ref 20–50)
LDLC SERPL CALC-MCNC: 123 MG/DL (ref 63–159)
NONHDLC SERPL-MCNC: 185 MG/DL
POTASSIUM SERPL-SCNC: 4.3 MMOL/L (ref 3.5–5.1)
PROT SERPL-MCNC: 7.2 G/DL (ref 6–8.4)
SODIUM SERPL-SCNC: 134 MMOL/L (ref 136–145)
TRIGL SERPL-MCNC: 310 MG/DL (ref 30–150)

## 2024-11-27 PROCEDURE — 99999 PR PBB SHADOW E&M-EST. PATIENT-LVL V: CPT | Mod: PBBFAC,,,

## 2024-11-27 PROCEDURE — 99999 PR PBB SHADOW E&M-EST. PATIENT-LVL V: CPT | Mod: PBBFAC,,, | Performed by: STUDENT IN AN ORGANIZED HEALTH CARE EDUCATION/TRAINING PROGRAM

## 2024-11-27 PROCEDURE — 80061 LIPID PANEL: CPT

## 2024-11-27 PROCEDURE — 80053 COMPREHEN METABOLIC PANEL: CPT

## 2024-11-27 PROCEDURE — 83036 HEMOGLOBIN GLYCOSYLATED A1C: CPT

## 2024-11-27 NOTE — PATIENT INSTRUCTIONS
Continue checking your blood sugars at least twice daily.    Increase your morning 70/30 dose to 40 units.     Increase your afternoon dose of 70/30 to 35 units until surgery.    On the day before surgery, only take 15 units for evening dose.    On the morning of surgery,  hold your insulin dose.     Resume your previous dose (40 units in the morning and 35 units in the evening ) whenever you're able to eat a regular diet.    Call [facility] patient assistance program to check the status of your application.     Hold metformin on the morning of surgery.     F/u in 1 month. Bring glucometer and log sheet.

## 2024-11-27 NOTE — PROGRESS NOTES
"CHIEF COMPLAINT: Type 2 Diabetes    Referred by PCP: Dr. Ruiz  Previously managed by: [facility] person of contact (if needed): Joanne Hooker     HPI: Mr. Billy Sheffield Miguel is a 59 y.o. male who was diagnosed with Type 2 DM 15 years ago.   Previous was on Medtronic 780g pump with Novolin R, but no longer able to afford supplies. Plan to re-start his previous insulin (MDI) regimen along with Ozempic pending approval from [facility] PAP.   He see podiatry and attends wound care. Declined CGM, phone not compatible.    Here today he's scheduled for Sx to right 2nd toe on 12/6/2024 and needs instructions on how to take his insulin leading up to Sx. Reports taking 70/30 (36/30) and still no change with his BG. Reports glucose range 200-250; mornings lower 200s, evenings 250s on his 70/30 (36/30) dose. BP controlled today. Also reports he's no longer feeling "sore" in the morning, he's able to move around better since he terminated the pump. Labs collected prior to visit.    BG monitoring:   Name: Relion Glucometer  How often:BID  Hypoglycemia: No      Hx of hospitalization for DM? No   Hx of HTN? Yes  Hx of CKD? No   Hx of CHF? No   Hx of CVA? No   Hx of MI? Yes   Hx of Pancreatitis? 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 Yes, exam UTD.  Hx of Long term steroid use? No        PREVIOUS DIABETES MEDICATIONS TRIED  Ozempic- cost  Lantus- cost   Fiasp-cost  Novolog / Humalog / Lispro - cost  Metformin- GI upset      CURRENT DIABETIC MEDS:   Novolin 70/30 (36/30)    Diabetes Management Status:  Statin: Taking  ACE/ARB: Taking    Screening or Prevention Patient's value Goal Complete/Controlled?   HgA1C Testing and Control   Lab Results   Component Value Date    HGBA1C 8.7 (H) 07/20/2023      Annually/Less than 8% No   Lipid profile : 07/20/2023 Annually No   LDL control Lab Results   Component Value Date    LDLCALC 71.0 07/20/2023    Annually/Less than 100 mg/dl  No   Nephropathy screening Lab Results   Component " Value Date    LABMICR 229.0 07/20/2023     Lab Results   Component Value Date    PROTEINUA 2+ (A) 01/26/2022    Annually No   Blood pressure BP Readings from Last 1 Encounters:   11/27/24 138/80    Less than 140/90 No   Dilated retinal exam : 10/15/2024 Annually No   Foot exam   Deferred; UTD Annually Yes     REVIEW OF SYSTEMS  General: Denies weakness, fatigue, or weight changes.   Eyes: Denies double or blurred vision, eye pain, or redness.  Cardiovascular: Denies CP, palpitations, or edema.   Respiratory: Denies cough or dyspnea.   GI: Denies heartburn, n/v, or changes in bowel patterns.   Skin: Denies rash, lesions, itching, or injection site problems.  Neuro: Denies severe Has, numbness, tingling, tremors, or vertigo.   Endocrine: Denies polyuria, polydipsia, polyphagia, heat or cold intolerance.     Vital Signs  /80   Pulse 64   Wt 129.2 kg (284 lb 13.4 oz)   SpO2 98%   BMI 36.57 kg/m²     Hemoglobin A1C   Date Value Ref Range Status   07/20/2023 8.7 (H) 4.0 - 5.6 % Final     Comment:     ADA Screening Guidelines:  5.7-6.4%  Consistent with prediabetes  >or=6.5%  Consistent with diabetes    High levels of fetal hemoglobin interfere with the HbA1C  assay. Heterozygous hemoglobin variants (HbS, HgC, etc)do  not significantly interfere with this assay.   However, presence of multiple variants may affect accuracy.     04/06/2023 9.4 (H) 4.0 - 5.6 % Final     Comment:     ADA Screening Guidelines:  5.7-6.4%  Consistent with prediabetes  >or=6.5%  Consistent with diabetes    High levels of fetal hemoglobin interfere with the HbA1C  assay. Heterozygous hemoglobin variants (HbS, HgC, etc)do  not significantly interfere with this assay.   However, presence of multiple variants may affect accuracy.     11/10/2022 7.1 (H) 4.0 - 5.6 % Final     Comment:     ADA Screening Guidelines:  5.7-6.4%  Consistent with prediabetes  >or=6.5%  Consistent with diabetes    High levels of fetal hemoglobin interfere with the  HbA1C  assay. Heterozygous hemoglobin variants (HbS, HgC, etc)do  not significantly interfere with this assay.   However, presence of multiple variants may affect accuracy.          Chemistry        Component Value Date/Time     (L) 03/20/2024 0742    K 4.7 03/20/2024 0742    CL 97 03/20/2024 0742    CO2 28 03/20/2024 0742    BUN 20 03/20/2024 0742    CREATININE 1.4 03/20/2024 0742     (H) 03/20/2024 0742        Component Value Date/Time    CALCIUM 9.6 03/20/2024 0742    ALKPHOS 110 03/20/2024 0742    AST 26 03/20/2024 0742    ALT 43 03/20/2024 0742    BILITOT 0.5 03/20/2024 0742    ESTGFRAFRICA >60.0 06/10/2022 0246    EGFRNONAA >60.0 06/10/2022 0246           Lab Results   Component Value Date    TSH 1.990 01/26/2022      Lab Results   Component Value Date    CHOL 135 07/20/2023    CHOL 151 04/06/2023    CHOL 121 11/10/2022     Lab Results   Component Value Date    HDL 39 (L) 07/20/2023    HDL 35 (L) 04/06/2023    HDL 34 (L) 11/10/2022     Lab Results   Component Value Date    LDLCALC 71.0 07/20/2023    LDLCALC 89.2 04/06/2023    LDLCALC 57.4 (L) 11/10/2022     Lab Results   Component Value Date    TRIG 125 07/20/2023    TRIG 134 04/06/2023    TRIG 148 11/10/2022     Lab Results   Component Value Date    CHOLHDL 28.9 07/20/2023    CHOLHDL 23.2 04/06/2023    CHOLHDL 28.1 11/10/2022         PHYSICAL EXAMINATION  Constitutional: Appears well, no distress.  Eyes: Sclera white, PERRLA, EOMs intact.  Neck: Supple, trachea midline.   Respiratory: No cough, SOB, nor HAIDER.  Cardiovascular: RRR; no edema.  Skin: Warm and dry; no rash, lesions, acanthosis nigricans, nor injection site reactions.  Neuro: AAOx4, steady gait  Psychiatric: No unusual, disruptive, or inappropriate behavior.       Assessment & Plan   1. Type 2 diabetes mellitus with hyperglycemia, with long-term current use of insulin  Microalbumin/creatinine urine ratio    -Cont POC at least BID  -Increase Novolin 70/30 to 40/35.  -Decrease evening  dose to 15 units the day prior to Sx.  -.Hold insulin and Metformin on day of Sx.   -Discussed goal to wean insulin to a minimal dose w/o experiencing hypo or hyperglycemia.   -Provided handout on 6 Tips for a Happy, Healthy Holiday with Diabetes.  -Provided savings card for Venu CGM.  -Provided glucose log sheet.  -Labs pending.  -Application pending for PAP for insulin and Ozempic via [facility].      2. Type 2 diabetes mellitus with diabetic peripheral angiopathy without gangrene, with long-term current use of insulin  Ambulatory referral/consult to Diabetic Advanced Practice Providers (Medical Management)    -Cont. wound care.  -Scheduled for Sx to right 2nd toe 12/6/2024.           3. Hypertension associated with diabetes  -Controlled on Norvasc, Coreg, and lisinopril.           FOLLOW UP  Follow up in about 1 month (around 12/27/2024).

## 2024-11-27 NOTE — PATIENT INSTRUCTIONS
Meds:  - Take 1/2 of Am  70/30 dose   - Take all BP and Cholesterol medications normally   - Stop Asprin 5 days prior to procedure and start again 24 hours after   - Hold Metformin morning of surgery

## 2024-11-29 ENCOUNTER — TELEPHONE (OUTPATIENT)
Dept: INTERNAL MEDICINE | Facility: CLINIC | Age: 59
End: 2024-11-29
Payer: COMMERCIAL

## 2024-12-03 ENCOUNTER — TELEPHONE (OUTPATIENT)
Dept: PODIATRY | Facility: CLINIC | Age: 59
End: 2024-12-03
Payer: COMMERCIAL

## 2024-12-03 RX ORDER — CLINDAMYCIN HYDROCHLORIDE 300 MG/1
300 CAPSULE ORAL EVERY 6 HOURS
Qty: 20 CAPSULE | Refills: 0 | Status: SHIPPED | OUTPATIENT
Start: 2024-12-03

## 2024-12-03 RX ORDER — CIPROFLOXACIN 500 MG/1
500 TABLET ORAL EVERY 12 HOURS
Qty: 20 TABLET | Refills: 0 | Status: SHIPPED | OUTPATIENT
Start: 2024-12-03

## 2024-12-03 NOTE — TELEPHONE ENCOUNTER
I spoke with patient fluid and blood coming those wrapping . No fever or weakness. He will reinforce the wrapping. Surgery is scheduled for Friday. Please advise thanks.

## 2024-12-04 ENCOUNTER — OFFICE VISIT (OUTPATIENT)
Dept: PODIATRY | Facility: CLINIC | Age: 59
End: 2024-12-04
Payer: COMMERCIAL

## 2024-12-04 VITALS
DIASTOLIC BLOOD PRESSURE: 80 MMHG | WEIGHT: 284.38 LBS | BODY MASS INDEX: 36.5 KG/M2 | HEART RATE: 64 BPM | HEIGHT: 74 IN | SYSTOLIC BLOOD PRESSURE: 138 MMHG

## 2024-12-04 DIAGNOSIS — L97.512 FOOT ULCER, RIGHT, WITH FAT LAYER EXPOSED: ICD-10-CM

## 2024-12-04 DIAGNOSIS — E11.49 TYPE II DIABETES MELLITUS WITH NEUROLOGICAL MANIFESTATIONS: Primary | ICD-10-CM

## 2024-12-04 PROCEDURE — 99999 PR PBB SHADOW E&M-EST. PATIENT-LVL III: CPT | Mod: PBBFAC,,, | Performed by: PODIATRIST

## 2024-12-05 ENCOUNTER — TELEPHONE (OUTPATIENT)
Dept: PODIATRY | Facility: CLINIC | Age: 59
End: 2024-12-05
Payer: COMMERCIAL

## 2024-12-05 ENCOUNTER — ANESTHESIA EVENT (OUTPATIENT)
Dept: SURGERY | Facility: HOSPITAL | Age: 59
End: 2024-12-05
Payer: COMMERCIAL

## 2024-12-05 NOTE — PRE-PROCEDURE INSTRUCTIONS
Unable to reach pt via phone. Left message with instructions along with a request for a call back. In the message, RN also stated that pt will need to be accompanied by a responsible adult on day of surgery. The following was sent to pt portal.    Dear Billy ,    You are scheduled for a procedure with Dr. Melara on 12/6/2024. Your scheduled arrival time is 5:30am.  This arrival time is roughly 2 hours before your anticipated procedure time to allow sufficient time for pre-op.  Please wear comfortable clothes.  This procedure will take place at the [facility] at the corner of [facility] and [facility].  It is in the [facility] next to [employer].  The address is:    [address] [place] [address].  [place] [id number]    After entering the building, you will proceed to the second floor where you can check in with registration. You should take any medications that you routinely take for blood pressure (other than those listed below), heart medications, thyroid, cholesterol, etc.     If you wear contact lenses, please wear glasses to your procedure.    Your fasting instructions are as follow:  Nothing to eat after midnight the evening before your surgery. Anesthesia encourages you to drink clear liquids up until 2 hours prior to your arrival time to ensure that you are adequately hydrated. You MUST have a responsible adult to bring you home.      The evening before and morning of your procedure, please hold the following medications:  -Aspirin and Aspirin-containing products (Goody's powder, Excedrin)  -NSAIDs (Advil, Ibuprofen, Aleve, Diclofenac)  -Vitamins/Supplements  -Herbal remedies/Teas  -Stimulants (Adderall, Vyvanse, Adipex)  -Diabetic medication (Please bring with you day of procedure)  -Prescription creams/gels/lotions  -CONTINUE HOLDING ASPIRIN  -METFORMIN - DO NOT TAKE TONIGHT OR IN THE MORNING  -ASPART - KEEP AT BASAL RATE - NO BOLUSES  -NOVOLIN - TAKE 11 UNITS IN THE  MORNING  -LISINOPRIL    -May take Tylenol      The evening before and morning of your procedure, take a shower using antibacterial soap (ex: Hibiclens or Dial antibacterial soap). DO NOT apply deodorant, lotion, cologne, or anything else to the skin. Wear loose, comfortable fitting clothing. Do not wear jewelry or bring any valuables with you. If you wear dentures or contacts, please bring your case with you or leave them at home. Use and bring any inhalers that you may have.    If you have any procedure-specific questions, please call your surgeon's office. Any other questions, don't hesitate to call at [phone].    Thanks,  PAPI Barrios  Pre-Admit Testing  Anesthesia Dept [facility]

## 2024-12-05 NOTE — TELEPHONE ENCOUNTER
Spoke to pt and relayed their 5:15am  arrival time for surgery [facility] 2nd floor. Also informed pt to not eat or drink after midnight including gum, hard candy or mints. Also that the pt must have a ride home from surgery, they will not be allowed to drive after anesthesia. Pt verbalized understanding and call ended. Post 10 am 12/11

## 2024-12-05 NOTE — ANESTHESIA PREPROCEDURE EVALUATION
12/05/2024  Billy Zambranoberta Miguel is a 59 y.o., male.      Pre-op Assessment    I have reviewed the Patient Summary Reports.     I have reviewed the Nursing Notes. I have reviewed the NPO Status.   I have reviewed the Medications.     Review of Systems  Anesthesia Hx:             Denies Family Hx of Anesthesia complications.    Denies Personal Hx of Anesthesia complications.                    Cardiovascular:     Hypertension   CAD                                          Endocrine:  Diabetes             Physical Exam  General: Well nourished    Airway:  Mallampati: II   Mouth Opening: Normal  TM Distance: Normal    Chest/Lungs:  Normal Respiratory Rate    Heart:  Rate: Normal    Anesthesia Plan  Type of Anesthesia, risks & benefits discussed:    Anesthesia Type: Gen Natural Airway  Intra-op Monitoring Plan: Standard ASA Monitors  Post Op Pain Control Plan: multimodal analgesia  Induction:  IV  Informed Consent: Informed consent signed with the Patient and all parties understand the risks and agree with anesthesia plan.  All questions answered.   ASA Score: 3  Day of Surgery Review of History & Physical: H&P Update referred to the surgeon/provider.    Ready For Surgery From Anesthesia Perspective.   .

## 2024-12-06 ENCOUNTER — HOSPITAL ENCOUNTER (OUTPATIENT)
Facility: HOSPITAL | Age: 59
Discharge: HOME | End: 2024-12-06
Attending: PODIATRIST | Admitting: PODIATRIST
Payer: COMMERCIAL

## 2024-12-06 ENCOUNTER — HOSPITAL ENCOUNTER (OUTPATIENT)
Dept: RADIOLOGY | Facility: HOSPITAL | Age: 59
Discharge: HOME | End: 2024-12-06
Attending: PODIATRIST | Admitting: PODIATRIST
Payer: COMMERCIAL

## 2024-12-06 ENCOUNTER — ANESTHESIA (OUTPATIENT)
Dept: SURGERY | Facility: HOSPITAL | Age: 59
End: 2024-12-06
Payer: COMMERCIAL

## 2024-12-06 VITALS
RESPIRATION RATE: 15 BRPM | DIASTOLIC BLOOD PRESSURE: 74 MMHG | OXYGEN SATURATION: 97 % | TEMPERATURE: 98 F | HEART RATE: 58 BPM | SYSTOLIC BLOOD PRESSURE: 129 MMHG

## 2024-12-06 DIAGNOSIS — Z98.890 STATUS POST FOOT SURGERY: ICD-10-CM

## 2024-12-06 DIAGNOSIS — M79.671 RIGHT FOOT PAIN: ICD-10-CM

## 2024-12-06 DIAGNOSIS — L97.514 ULCER OF TOE, RIGHT, WITH NECROSIS OF BONE: ICD-10-CM

## 2024-12-06 DIAGNOSIS — M79.671 RIGHT FOOT PAIN: Primary | ICD-10-CM

## 2024-12-06 DIAGNOSIS — L97.509 FOOT ULCER: ICD-10-CM

## 2024-12-06 LAB
POCT GLUCOSE: 247 MG/DL (ref 70–110)
POCT GLUCOSE: 337 MG/DL (ref 70–110)

## 2024-12-06 PROCEDURE — 63600175 PHARM REV CODE 636 W HCPCS: Performed by: NURSE ANESTHETIST, CERTIFIED REGISTERED

## 2024-12-06 PROCEDURE — 94761 N-INVAS EAR/PLS OXIMETRY MLT: CPT

## 2024-12-06 PROCEDURE — 82962 GLUCOSE BLOOD TEST: CPT | Performed by: PODIATRIST

## 2024-12-06 PROCEDURE — 28122 PARTIAL REMOVAL OF FOOT BONE: CPT | Mod: RT,,, | Performed by: PODIATRIST

## 2024-12-06 PROCEDURE — 63600175 PHARM REV CODE 636 W HCPCS: Performed by: PODIATRIST

## 2024-12-06 PROCEDURE — 25000003 PHARM REV CODE 250

## 2024-12-06 PROCEDURE — 36000706: Performed by: PODIATRIST

## 2024-12-06 PROCEDURE — 37000008 HC ANESTHESIA 1ST 15 MINUTES: Performed by: PODIATRIST

## 2024-12-06 PROCEDURE — 71000033 HC RECOVERY, INTIAL HOUR: Performed by: PODIATRIST

## 2024-12-06 PROCEDURE — 99900035 HC TECH TIME PER 15 MIN (STAT)

## 2024-12-06 PROCEDURE — 36000707: Performed by: PODIATRIST

## 2024-12-06 PROCEDURE — 71000015 HC POSTOP RECOV 1ST HR: Performed by: PODIATRIST

## 2024-12-06 PROCEDURE — 37000009 HC ANESTHESIA EA ADD 15 MINS: Performed by: PODIATRIST

## 2024-12-06 PROCEDURE — 25000003 PHARM REV CODE 250: Performed by: NURSE ANESTHETIST, CERTIFIED REGISTERED

## 2024-12-06 RX ORDER — GLUCAGON 1 MG
1 KIT INJECTION
Status: DISCONTINUED | OUTPATIENT
Start: 2024-12-06 | End: 2024-12-06 | Stop reason: HOSPADM

## 2024-12-06 RX ORDER — FENTANYL CITRATE 50 UG/ML
25 INJECTION, SOLUTION INTRAMUSCULAR; INTRAVENOUS EVERY 5 MIN PRN
Status: DISCONTINUED | OUTPATIENT
Start: 2024-12-06 | End: 2024-12-06 | Stop reason: HOSPADM

## 2024-12-06 RX ORDER — HYDROMORPHONE HYDROCHLORIDE 1 MG/ML
0.2 INJECTION, SOLUTION INTRAMUSCULAR; INTRAVENOUS; SUBCUTANEOUS EVERY 5 MIN PRN
Status: DISCONTINUED | OUTPATIENT
Start: 2024-12-06 | End: 2024-12-06 | Stop reason: HOSPADM

## 2024-12-06 RX ORDER — PROCHLORPERAZINE EDISYLATE 5 MG/ML
5 INJECTION INTRAMUSCULAR; INTRAVENOUS EVERY 30 MIN PRN
Status: DISCONTINUED | OUTPATIENT
Start: 2024-12-06 | End: 2024-12-06 | Stop reason: HOSPADM

## 2024-12-06 RX ORDER — FENTANYL CITRATE 50 UG/ML
INJECTION, SOLUTION INTRAMUSCULAR; INTRAVENOUS
Status: DISCONTINUED | OUTPATIENT
Start: 2024-12-06 | End: 2024-12-06

## 2024-12-06 RX ORDER — LIDOCAINE HYDROCHLORIDE 20 MG/ML
INJECTION, SOLUTION INFILTRATION; PERINEURAL
Status: DISCONTINUED | OUTPATIENT
Start: 2024-12-06 | End: 2024-12-06 | Stop reason: HOSPADM

## 2024-12-06 RX ORDER — BUPIVACAINE HYDROCHLORIDE 5 MG/ML
INJECTION, SOLUTION PERINEURAL
Status: DISCONTINUED | OUTPATIENT
Start: 2024-12-06 | End: 2024-12-06 | Stop reason: HOSPADM

## 2024-12-06 RX ORDER — OXYCODONE AND ACETAMINOPHEN 5; 325 MG/1; MG/1
1 TABLET ORAL
Status: DISCONTINUED | OUTPATIENT
Start: 2024-12-06 | End: 2024-12-06 | Stop reason: HOSPADM

## 2024-12-06 RX ORDER — OXYCODONE AND ACETAMINOPHEN 7.5; 325 MG/1; MG/1
1 TABLET ORAL EVERY 6 HOURS PRN
Qty: 24 TABLET | Refills: 0 | Status: ON HOLD | OUTPATIENT
Start: 2024-12-06

## 2024-12-06 RX ORDER — SODIUM CHLORIDE 0.9 % (FLUSH) 0.9 %
10 SYRINGE (ML) INJECTION
Status: DISCONTINUED | OUTPATIENT
Start: 2024-12-06 | End: 2024-12-06 | Stop reason: HOSPADM

## 2024-12-06 RX ORDER — ONDANSETRON HYDROCHLORIDE 2 MG/ML
4 INJECTION, SOLUTION INTRAVENOUS DAILY PRN
Status: DISCONTINUED | OUTPATIENT
Start: 2024-12-06 | End: 2024-12-06 | Stop reason: HOSPADM

## 2024-12-06 RX ORDER — KETAMINE HYDROCHLORIDE 100 MG/ML
INJECTION, SOLUTION INTRAMUSCULAR; INTRAVENOUS
Status: DISCONTINUED | OUTPATIENT
Start: 2024-12-06 | End: 2024-12-06

## 2024-12-06 RX ORDER — MIDAZOLAM HYDROCHLORIDE 1 MG/ML
INJECTION INTRAMUSCULAR; INTRAVENOUS
Status: DISCONTINUED | OUTPATIENT
Start: 2024-12-06 | End: 2024-12-06

## 2024-12-06 RX ORDER — LIDOCAINE HYDROCHLORIDE 20 MG/ML
INJECTION INTRAVENOUS
Status: DISCONTINUED | OUTPATIENT
Start: 2024-12-06 | End: 2024-12-06

## 2024-12-06 RX ORDER — ONDANSETRON HYDROCHLORIDE 2 MG/ML
INJECTION, SOLUTION INTRAVENOUS
Status: DISCONTINUED | OUTPATIENT
Start: 2024-12-06 | End: 2024-12-06

## 2024-12-06 RX ORDER — PROPOFOL 10 MG/ML
VIAL (ML) INTRAVENOUS
Status: DISCONTINUED | OUTPATIENT
Start: 2024-12-06 | End: 2024-12-06

## 2024-12-06 RX ORDER — HYDROCODONE BITARTRATE AND ACETAMINOPHEN 5; 325 MG/1; MG/1
1 TABLET ORAL EVERY 4 HOURS PRN
Status: DISCONTINUED | OUTPATIENT
Start: 2024-12-06 | End: 2024-12-06 | Stop reason: HOSPADM

## 2024-12-06 RX ADMIN — ONDANSETRON 4 MG: 2 INJECTION INTRAMUSCULAR; INTRAVENOUS at 07:12

## 2024-12-06 RX ADMIN — GLYCOPYRROLATE 0.2 MG: 0.2 INJECTION, SOLUTION INTRAMUSCULAR; INTRAVENOUS at 07:12

## 2024-12-06 RX ADMIN — MIDAZOLAM HYDROCHLORIDE 2 MG: 1 INJECTION, SOLUTION INTRAMUSCULAR; INTRAVENOUS at 06:12

## 2024-12-06 RX ADMIN — LIDOCAINE HYDROCHLORIDE 40 MG: 20 INJECTION INTRAVENOUS at 07:12

## 2024-12-06 RX ADMIN — SODIUM CHLORIDE: 0.9 INJECTION, SOLUTION INTRAVENOUS at 06:12

## 2024-12-06 RX ADMIN — PROPOFOL 20 MG: 10 INJECTION, EMULSION INTRAVENOUS at 07:12

## 2024-12-06 RX ADMIN — FENTANYL CITRATE 25 MCG: 50 INJECTION, SOLUTION INTRAMUSCULAR; INTRAVENOUS at 07:12

## 2024-12-06 RX ADMIN — KETAMINE HYDROCHLORIDE 10 MG: 100 INJECTION INTRAMUSCULAR; INTRAVENOUS at 07:12

## 2024-12-06 RX ADMIN — PROPOFOL 100 MCG/KG/MIN: 10 INJECTION, EMULSION INTRAVENOUS at 07:12

## 2024-12-06 NOTE — DISCHARGE INSTRUCTIONS
Keep dressings dry and intact, if strike- through noted, please reinforce dressings with kerlix and ace.  Weight bearing as tolerated to   Right foot in post op shoe.  Elevate surgical extremity.      Contact podiatry for all post op follow up care and if any problems arise.

## 2024-12-06 NOTE — PLAN OF CARE
VSS. Discharge instructions reviewed with patient, patient verbalizes understanding. No complaints of pain or discomfort.  PIV removed without difficulty and catheter intact. Patient discharged home safely to family via wheelchair.    Patient states that he has surgical shoe at home.

## 2024-12-06 NOTE — ANESTHESIA POSTPROCEDURE EVALUATION
Anesthesia Post Evaluation    Patient: Billy Yatesn    Procedure(s) Performed: Procedure(s) (LRB):  EXCISION,TARSAL OR METATARSAL BONE,EXCLUDING TALUS OR CALCANEUS,PARTIAL (Right)    Final Anesthesia Type: general      Patient location during evaluation: PACU  Patient participation: Yes- Able to Participate  Level of consciousness: awake and alert  Post-procedure vital signs: reviewed and stable  Pain management: adequate  Airway patency: patent    PONV status at discharge: No PONV  Anesthetic complications: no      Cardiovascular status: stable  Respiratory status: spontaneous ventilation  Hydration status: euvolemic  Follow-up not needed.          Vitals Value Taken Time   /74 12/06/24 0902   Temp 36.6 °C (97.9 °F) 12/06/24 0815   Pulse 57 12/06/24 0907   Resp 5 12/06/24 0904   SpO2 97 % 12/06/24 0907   Vitals shown include unfiled device data.      Event Time   Out of Recovery 08:57:00         Pain/Gabriel Score: Gabriel Score: 10 (12/6/2024  8:57 AM)

## 2024-12-06 NOTE — TRANSFER OF CARE
Anesthesia Transfer of Care Note    Patient: Billy Rivera    Procedure(s) Performed: Procedure(s) (LRB):  EXCISION,TARSAL OR METATARSAL BONE,EXCLUDING TALUS OR CALCANEUS,PARTIAL (Right)    Patient location: PACU    Anesthesia Type: general    Transport from OR: Transported from OR on 6-10 L/min O2 by face mask with adequate spontaneous ventilation    Post pain: adequate analgesia    Post assessment: no apparent anesthetic complications and tolerated procedure well    Post vital signs: stable    Level of consciousness: sedated    Nausea/Vomiting: no nausea/vomiting    Complications: none    Transfer of care protocol was followed      Last vitals: Visit Vitals  BP (!) 157/84 (BP Location: Right arm, Patient Position: Lying)   Pulse 68   Temp 36.4 °C (97.5 °F) (Temporal)   Resp 16   SpO2 98%

## 2024-12-06 NOTE — PLAN OF CARE
Pt in preop bay 32, VSS, and IV inserted. Pt denies any open wounds on body or the use of any weight loss injections. Pt needs anesthesia consents signed and updated H&P, otherwise ready to roll. Procedural consents verified with pt.

## 2024-12-06 NOTE — PLAN OF CARE
Blood sugar level 337. Dr Alfredito DOUGLAS notified at bedside and okayed to proceed procedure without interventions.

## 2024-12-06 NOTE — DISCHARGE SUMMARY
Discharge Note    OUTCOME: Patient tolerated treatment/procedure well without complication and is now ready for discharge.    DISPOSITION: Home or Self Care    FINAL DIAGNOSIS:  <principal problem not specified>    FOLLOWUP: In clinic    DISCHARGE INSTRUCTIONS:  Patient tolerated procedure well. Patient was transferred to the recovery room with vital signs stable and vascular status intact.      Following a period of post op monitoring, the patient will be discharged.     Keep dressings dry and intact, if strike- through noted, please reinforce dressings with kerlix and ace.  Weight bearing as tolerated to   Right foot in post op shoe.  Elevate surgical extremity.      Contact podiatry for all post op follow up care and if any problems arise.

## 2024-12-06 NOTE — BRIEF OP NOTE
[facility] ([facility])  Brief Operative Note    Surgery Date: 12/6/2024     Surgeons and Role:     * Mingo Melara DPM - Primary    Assisting Surgeon: Robert Brand PGY2    Pre-op Diagnosis:  Type II diabetes mellitus with neurological manifestations [E11.49]  Foot ulcer, right, with fat layer exposed [L97.512]  Metatarsal deformity, right [M21.961]    Post-op Diagnosis:  Post-Op Diagnosis Codes:     * Type II diabetes mellitus with neurological manifestations [E11.49]     * Foot ulcer, right, with fat layer exposed [L97.512]     * Metatarsal deformity, right [M21.961]    Procedure(s) (LRB):  EXCISION,TARSAL OR METATARSAL BONE,EXCLUDING TALUS OR CALCANEUS,PARTIAL (Right)    Anesthesia: Local MAC    Operative Findings: Right foot 2nd met head excision. Ulcer debridement.     Estimated Blood Loss: < 2 cc         Specimens:   Specimen (24h ago, onward)      None              Discharge Note    OUTCOME: Patient tolerated treatment/procedure well without complication and is now ready for discharge.    DISPOSITION: Home or Self Care    FINAL DIAGNOSIS:  <principal problem not specified>    FOLLOWUP: In clinic    DISCHARGE INSTRUCTIONS:  Patient tolerated procedure well. Patient was transferred to the recovery room with vital signs stable and vascular status intact.      Following a period of post op monitoring, the patient will be discharged.     Keep dressings dry and intact, if strike- through noted, please reinforce dressings with kerlix and ace.  Weight bearing as tolerated to   Right foot  in post op shoe.  Elevate surgical extremity.      Contact podiatry for all post op follow up care and if any problems arise.

## 2024-12-06 NOTE — OP NOTE
[facility] ([facility])  Operative Note      Date of Procedure: 12/6/2024     Procedure: Procedure(s) (LRB):  EXCISION,TARSAL OR METATARSAL BONE,EXCLUDING TALUS OR CALCANEUS,PARTIAL (Right)     Surgeons and Role:     * Mingo Melara DPM - Primary    Assisting Surgeon: Cathie    Pre-Operative Diagnosis: Type II diabetes mellitus with neurological manifestations [E11.49]  Foot ulcer, right, with fat layer exposed [L97.512]  Metatarsal deformity, right [M21.961]    Post-Operative Diagnosis: Post-Op Diagnosis Codes:     * Type II diabetes mellitus with neurological manifestations [E11.49]     * Foot ulcer, right, with fat layer exposed [L97.512]     * Metatarsal deformity, right [M21.961]    Anesthesia: Local MAC    Operative Findings (including complications, if any): Right foot 2nd met head excision. Ulcer debridement.     Description of Technical Procedures: Long discussion with patient regarding the procedure in detail, Patient understands all risks, potential complications, and alternatives, including, but not limited to those listed on the consent form.  No guarantees given or implied as to outcome. Informed verbal and written consent was obtained. Patient verbalized understanding and would still like to continue with surgery. Consent form discussed, signed and witnessed appropriately. Patient was marked.      The patient was brought to the operating room on a stretcher and placed on the operating table in the supine position. Following induction of MAC, an ankle tourniquet was applied to the right ankle. Following this, local anesthetic of 20 cc of 1:1 2% lidocaine and 0.25% Bupivacaine was used for a 2nd metatarsal block. The right foot was then scrubbed and prepped in an aseptic manner. An esmarch was used to exsanguinate the foot. The tourniquet was inflated to 250 mmHg. A time out was performed confirming the patients identification, procedure, surgical site, medications, and  allergies. All were in agreement.    Attention was directed to the right foot 2nd metatarsal head. The plantar aspect of the 2nd MPJ had extensive callus/pre-ulcerative lesion. A linear incision over the 2nd MPJ was conducted and dissection was carried out down to bone with care to avoid extensor tendon, and to avoid important neuro-vasculature structures. The 2nd MPJ was noted to be extremely arthritic. A small portion of the base of the proximal phalanx was removed to allow for better visualization of the 2nd met head and to also address the dislocation. After removal of the portion of the base of the 2nd digit proximal phalanx, the 2nd met head was removed just proximal to the neck of the 2nd metatarsal. A sagittal saw was used to remove both portions. After removal, the 2nd ray had notable improvement in plantar pressure. Then, the callus/pre-ulcerative lesion was debrided with a #15 blade down to level of dermis. Callus, slough, and necrotic tissue were debrided.     The surgical site was closed as follows: 3-0 Vicryl for deep closure, and 3-0 Nylon for superficial. The surgical site was then dressed with xeroform, gauze, cast padding, and ace wrap.         Estimated Blood Loss (EBL): < 2 cc           Implants: * No implants in log *    Specimens:   Specimen (24h ago, onward)      None                    Condition: Good    Disposition: PACU - hemodynamically stable.    Attestation: I was present and scrubbed for the entire procedure.    Discharge Note     OUTCOME: Patient tolerated treatment/procedure well without complication and is now ready for discharge.     DISPOSITION: Home or Self Care     FINAL DIAGNOSIS:  <principal problem not specified>     FOLLOWUP: In clinic     DISCHARGE INSTRUCTIONS:  Patient tolerated procedure well. Patient was transferred to the recovery room with vital signs stable and vascular status intact.      Following a period of post op monitoring, the patient will be discharged.       Keep dressings dry and intact, if strike- through noted, please reinforce dressings with kerlix and ace.  Weight bearing as tolerated to   Right foot  in post op shoe.  Elevate surgical extremity.      Contact podiatry for all post op follow up care and if any problems arise.     [employer] Counseling:  I discussed with the patient the risks of mycophenolate mofetil including but not limited to infection/immunosuppression, GI upset, hypokalemia, hypercholesterolemia, bone marrow suppression, lymphoproliferative disorders, malignancy, GI ulceration/bleed/perforation, colitis, interstitial lung disease, kidney failure, progressive multifocal leukoencephalopathy, and birth defects.  The patient understands that monitoring is required including a baseline creatinine and regular CBC testing. In addition, patient must alert us immediately if symptoms of infection or other concerning signs are noted.

## 2024-12-09 ENCOUNTER — OFFICE VISIT (OUTPATIENT)
Dept: PODIATRY | Facility: CLINIC | Age: 59
End: 2024-12-09
Payer: COMMERCIAL

## 2024-12-09 VITALS
HEIGHT: 74 IN | BODY MASS INDEX: 36.51 KG/M2 | DIASTOLIC BLOOD PRESSURE: 70 MMHG | SYSTOLIC BLOOD PRESSURE: 115 MMHG | HEART RATE: 66 BPM

## 2024-12-09 DIAGNOSIS — M21.961 METATARSAL DEFORMITY, RIGHT: Primary | ICD-10-CM

## 2024-12-09 PROCEDURE — 3046F HEMOGLOBIN A1C LEVEL >9.0%: CPT | Mod: CPTII,S$GLB,, | Performed by: PODIATRIST

## 2024-12-09 PROCEDURE — 99024 POSTOP FOLLOW-UP VISIT: CPT | Mod: S$GLB,,, | Performed by: PODIATRIST

## 2024-12-09 PROCEDURE — 3066F NEPHROPATHY DOC TX: CPT | Mod: CPTII,S$GLB,, | Performed by: PODIATRIST

## 2024-12-09 PROCEDURE — 3078F DIAST BP <80 MM HG: CPT | Mod: CPTII,S$GLB,, | Performed by: PODIATRIST

## 2024-12-09 PROCEDURE — 4010F ACE/ARB THERAPY RXD/TAKEN: CPT | Mod: CPTII,S$GLB,, | Performed by: PODIATRIST

## 2024-12-09 PROCEDURE — 3074F SYST BP LT 130 MM HG: CPT | Mod: CPTII,S$GLB,, | Performed by: PODIATRIST

## 2024-12-09 PROCEDURE — 3062F POS MACROALBUMINURIA REV: CPT | Mod: CPTII,S$GLB,, | Performed by: PODIATRIST

## 2024-12-09 PROCEDURE — 99999 PR PBB SHADOW E&M-EST. PATIENT-LVL V: CPT | Mod: PBBFAC,,, | Performed by: PODIATRIST

## 2024-12-09 RX ORDER — DOXYCYCLINE 100 MG/1
100 CAPSULE ORAL 2 TIMES DAILY
Qty: 20 CAPSULE | Refills: 0 | Status: ON HOLD | OUTPATIENT
Start: 2024-12-09 | End: 2024-12-18 | Stop reason: HOSPADM

## 2024-12-11 NOTE — PROGRESS NOTES
Subjective:      Patient ID: Billy Rivera is a 59 y.o. male.    Chief Complaint:  Follow-up wound care, right foot.      Billy is a 59 y.o. male who presents to the clinic for evaluation and treatment of high risk feet. Billy has a past medical history of Allergy, CAD (coronary artery disease), native coronary artery (6/25/2013), Cancer, COVID-19 virus detected (9/1/2020), Diabetes mellitus, Diabetes mellitus, type 2, Disorder of kidney and ureter, Heart attack (04/2012), colon cancer, stage I, Hyperlipidemia, Hypertension, Muscular pain, NSTEMI May 2013 - peak troponin 0.22 (5/22/2013), MICHAELA (obstructive sleep apnea), and Retinopathy due to secondary diabetes.  New issue ulceration/blister right foot. Improving.  Scheduled for metatarsal head resection this Friday.  Was concerned about opening of the wound.      PCP: Augie Ruiz MD    Date Last Seen by PCP:   Chief Complaint   Patient presents with    Wound Care     Right foot      History of Trauma: negative  Sign of Infection: none    Hemoglobin A1C   Date Value Ref Range Status   11/27/2024 10.2 (H) 4.0 - 5.6 % Final     Comment:     ADA Screening Guidelines:  5.7-6.4%  Consistent with prediabetes  >or=6.5%  Consistent with diabetes    High levels of fetal hemoglobin interfere with the HbA1C  assay. Heterozygous hemoglobin variants (HbS, HgC, etc)do  not significantly interfere with this assay.   However, presence of multiple variants may affect accuracy.     07/20/2023 8.7 (H) 4.0 - 5.6 % Final     Comment:     ADA Screening Guidelines:  5.7-6.4%  Consistent with prediabetes  >or=6.5%  Consistent with diabetes    High levels of fetal hemoglobin interfere with the HbA1C  assay. Heterozygous hemoglobin variants (HbS, HgC, etc)do  not significantly interfere with this assay.   However, presence of multiple variants may affect accuracy.     04/06/2023 9.4 (H) 4.0 - 5.6 % Final     Comment:     ADA Screening Guidelines:  5.7-6.4%  Consistent  "with prediabetes  >or=6.5%  Consistent with diabetes    High levels of fetal hemoglobin interfere with the HbA1C  assay. Heterozygous hemoglobin variants (HbS, HgC, etc)do  not significantly interfere with this assay.   However, presence of multiple variants may affect accuracy.                 Objective:      Vitals:    12/04/24 0942   BP: 138/80   Pulse: 64   Weight: 129 kg (284 lb 6.3 oz)   Height: 6' 2" (1.88 m)   PainSc: 0-No pain        Physical Exam  Constitutional:       General: He is not in acute distress.     Appearance: Normal appearance. He is well-developed and normal weight.   HENT:      Head: Normocephalic and atraumatic.   Cardiovascular:      Pulses:           Dorsalis pedis pulses are 2+ on the right side and 2+ on the left side.        Posterior tibial pulses are 1+ on the right side and 1+ on the left side.      Comments: CFT< 3 secs all toes bilateral foot, skin temp warm to cool bilateral foot, no hair growth bilateral lower extremity, no edema to bilateral lower extremities    Pulmonary:      Effort: Pulmonary effort is normal.   Musculoskeletal:         General: No swelling.      Right foot: Normal range of motion. Deformity present. No foot drop.      Left foot: Normal range of motion. Deformity present. No foot drop.      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     5/5 muscle strength with DF/PF/Inv/Ev bilateral.       Feet:      Right foot:      Protective Sensation: 5 sites tested.  0 sites sensed.      Skin integrity: Ulcer, skin breakdown, callus and dry skin present. No erythema.      Toenail Condition: Right toenails are abnormally thick.      Left foot:      Protective Sensation: 5 sites tested.  0 sites sensed.      Skin integrity: Callus and dry skin present. No ulcer, skin breakdown or erythema.      Toenail Condition: Left toenails are abnormally thick.   Skin:     General: Skin is warm and dry.      Capillary Refill: Capillary " refill takes 2 to 3 seconds.      Findings: No erythema.      Nails: There is no clubbing.      Comments: Ulceration location:  Right ulceration sub 2nd MPJ noted to be healed.   Neurological:      Mental Status: He is alert and oriented to person, place, and time.      Sensory: Sensory deficit present.      Motor: No weakness.      Deep Tendon Reflexes:      Reflex Scores:       Patellar reflexes are 2+ on the right side and 2+ on the left side.       Achilles reflexes are 2+ on the right side and 2+ on the left side.     Comments: Diminished/loss of protective sensation all toes bilateral to 10 gram monofilament.   Psychiatric:         Mood and Affect: Mood normal.         Behavior: Behavior normal.         Thought Content: Thought content normal.         10/30:  Plantar R foot wound with hyperkeratotic periwound.  Negative probe to bone.  No signs of infection.        Encounter Diagnoses   Name Primary?    Type II diabetes mellitus with neurological manifestations Yes    Foot ulcer, right, with fat layer exposed          Plan:       Billy was seen today for wound care.    Diagnoses and all orders for this visit:    Type II diabetes mellitus with neurological manifestations    Foot ulcer, right, with fat layer exposed        Independent review of patients previous clinical notes        Reviewed current medication(s), and lab(s) specific to foot ailment(s) with patient in detail. All questions answered     The nature of the condition, options for management, as well as potential risks and complications were discussed in detail with patient. Patient was amenable to my recommendations and left my office fully informed and will follow up as instructed or sooner if necessary.      Follow-up for surgery/metatarsal head resection.

## 2024-12-12 ENCOUNTER — HOSPITAL ENCOUNTER (INPATIENT)
Facility: HOSPITAL | Age: 59
LOS: 6 days | Discharge: HOME HEALTH | DRG: 623 | End: 2024-12-18
Attending: INTERNAL MEDICINE | Admitting: STUDENT IN AN ORGANIZED HEALTH CARE EDUCATION/TRAINING PROGRAM
Payer: COMMERCIAL

## 2024-12-12 DIAGNOSIS — R55 SYNCOPE: ICD-10-CM

## 2024-12-12 DIAGNOSIS — L97.509 DIABETIC FOOT ULCER ASSOCIATED WITH DIABETES MELLITUS DUE TO UNDERLYING CONDITION: ICD-10-CM

## 2024-12-12 DIAGNOSIS — E08.621 DIABETIC FOOT ULCER ASSOCIATED WITH DIABETES MELLITUS DUE TO UNDERLYING CONDITION: ICD-10-CM

## 2024-12-12 DIAGNOSIS — L97.512 ULCER OF RIGHT FOOT WITH FAT LAYER EXPOSED: ICD-10-CM

## 2024-12-12 DIAGNOSIS — L08.9 SOFT TISSUE INFECTION: Primary | ICD-10-CM

## 2024-12-12 DIAGNOSIS — L97.514 ULCER OF TOE, RIGHT, WITH NECROSIS OF BONE: ICD-10-CM

## 2024-12-12 DIAGNOSIS — E08.621 DIABETIC ULCER OF OTHER PART OF RIGHT FOOT ASSOCIATED WITH DIABETES MELLITUS DUE TO UNDERLYING CONDITION, WITH BONE INVOLVEMENT WITHOUT EVIDENCE OF NECROSIS: ICD-10-CM

## 2024-12-12 DIAGNOSIS — M86.9 OSTEOMYELITIS: ICD-10-CM

## 2024-12-12 DIAGNOSIS — L97.516 DIABETIC ULCER OF OTHER PART OF RIGHT FOOT ASSOCIATED WITH DIABETES MELLITUS DUE TO UNDERLYING CONDITION, WITH BONE INVOLVEMENT WITHOUT EVIDENCE OF NECROSIS: ICD-10-CM

## 2024-12-12 DIAGNOSIS — R07.9 CHEST PAIN: ICD-10-CM

## 2024-12-12 DIAGNOSIS — B95.8 BACTERIAL INFECTION DUE TO STAPHYLOCOCCUS: ICD-10-CM

## 2024-12-12 DIAGNOSIS — R05.9 COUGH: ICD-10-CM

## 2024-12-12 DIAGNOSIS — M86.18 OTHER ACUTE OSTEOMYELITIS, OTHER SITE: ICD-10-CM

## 2024-12-12 PROBLEM — E66.9 OBESITY: Status: ACTIVE | Noted: 2024-12-12

## 2024-12-12 LAB
ALBUMIN SERPL BCP-MCNC: 2.6 G/DL (ref 3.5–5.2)
ALP SERPL-CCNC: 90 U/L (ref 40–150)
ALT SERPL W/O P-5'-P-CCNC: 21 U/L (ref 10–44)
ANION GAP SERPL CALC-SCNC: 12 MMOL/L (ref 8–16)
AST SERPL-CCNC: 23 U/L (ref 10–40)
BACTERIA #/AREA URNS AUTO: ABNORMAL /HPF
BASOPHILS # BLD AUTO: 0.04 K/UL (ref 0–0.2)
BASOPHILS NFR BLD: 0.4 % (ref 0–1.9)
BILIRUB SERPL-MCNC: 0.7 MG/DL (ref 0.1–1)
BILIRUB UR QL STRIP: NEGATIVE
BUN SERPL-MCNC: 19 MG/DL (ref 6–20)
CALCIUM SERPL-MCNC: 8.6 MG/DL (ref 8.7–10.5)
CHLORIDE SERPL-SCNC: 96 MMOL/L (ref 95–110)
CLARITY UR REFRACT.AUTO: CLEAR
CO2 SERPL-SCNC: 22 MMOL/L (ref 23–29)
COLOR UR AUTO: YELLOW
CREAT SERPL-MCNC: 1.4 MG/DL (ref 0.5–1.4)
CRP SERPL-MCNC: 113.2 MG/L (ref 0–8.2)
DIFFERENTIAL METHOD BLD: ABNORMAL
EOSINOPHIL # BLD AUTO: 0.1 K/UL (ref 0–0.5)
EOSINOPHIL NFR BLD: 0.8 % (ref 0–8)
ERYTHROCYTE [DISTWIDTH] IN BLOOD BY AUTOMATED COUNT: 13.2 % (ref 11.5–14.5)
ERYTHROCYTE [SEDIMENTATION RATE] IN BLOOD BY PHOTOMETRIC METHOD: 83 MM/HR (ref 0–23)
EST. GFR  (NO RACE VARIABLE): 57.5 ML/MIN/1.73 M^2
GLUCOSE SERPL-MCNC: 298 MG/DL (ref 70–110)
GLUCOSE UR QL STRIP: ABNORMAL
HCT VFR BLD AUTO: 42.2 % (ref 40–54)
HCV AB SERPL QL IA: NORMAL
HGB BLD-MCNC: 13.7 G/DL (ref 14–18)
HGB UR QL STRIP: ABNORMAL
HIV 1+2 AB+HIV1 P24 AG SERPL QL IA: NORMAL
HYALINE CASTS UR QL AUTO: 26 /LPF
IMM GRANULOCYTES # BLD AUTO: 0.04 K/UL (ref 0–0.04)
IMM GRANULOCYTES NFR BLD AUTO: 0.4 % (ref 0–0.5)
INFLUENZA A, MOLECULAR: NEGATIVE
INFLUENZA B, MOLECULAR: NEGATIVE
KETONES UR QL STRIP: ABNORMAL
LEUKOCYTE ESTERASE UR QL STRIP: NEGATIVE
LYMPHOCYTES # BLD AUTO: 0.6 K/UL (ref 1–4.8)
LYMPHOCYTES NFR BLD: 6.7 % (ref 18–48)
MCH RBC QN AUTO: 28.3 PG (ref 27–31)
MCHC RBC AUTO-ENTMCNC: 32.5 G/DL (ref 32–36)
MCV RBC AUTO: 87 FL (ref 82–98)
MICROSCOPIC COMMENT: ABNORMAL
MONOCYTES # BLD AUTO: 1.4 K/UL (ref 0.3–1)
MONOCYTES NFR BLD: 15.1 % (ref 4–15)
NEUTROPHILS # BLD AUTO: 7.3 K/UL (ref 1.8–7.7)
NEUTROPHILS NFR BLD: 76.6 % (ref 38–73)
NITRITE UR QL STRIP: NEGATIVE
NRBC BLD-RTO: 0 /100 WBC
PH UR STRIP: 6 [PH] (ref 5–8)
PLATELET # BLD AUTO: 284 K/UL (ref 150–450)
PMV BLD AUTO: 10.5 FL (ref 9.2–12.9)
POTASSIUM SERPL-SCNC: 4.9 MMOL/L (ref 3.5–5.1)
PROT SERPL-MCNC: 7.5 G/DL (ref 6–8.4)
PROT UR QL STRIP: ABNORMAL
RBC # BLD AUTO: 4.84 M/UL (ref 4.6–6.2)
RBC #/AREA URNS AUTO: 4 /HPF (ref 0–4)
SARS-COV-2 RDRP RESP QL NAA+PROBE: NEGATIVE
SODIUM SERPL-SCNC: 130 MMOL/L (ref 136–145)
SP GR UR STRIP: 1.03 (ref 1–1.03)
SPECIMEN SOURCE: NORMAL
SQUAMOUS #/AREA URNS AUTO: 0 /HPF
URN SPEC COLLECT METH UR: ABNORMAL
WBC # BLD AUTO: 9.54 K/UL (ref 3.9–12.7)
WBC #/AREA URNS AUTO: 1 /HPF (ref 0–5)
YEAST UR QL AUTO: ABNORMAL

## 2024-12-12 PROCEDURE — 87389 HIV-1 AG W/HIV-1&-2 AB AG IA: CPT | Performed by: PHYSICIAN ASSISTANT

## 2024-12-12 PROCEDURE — 12000002 HC ACUTE/MED SURGE SEMI-PRIVATE ROOM

## 2024-12-12 PROCEDURE — 86140 C-REACTIVE PROTEIN: CPT

## 2024-12-12 PROCEDURE — 86803 HEPATITIS C AB TEST: CPT | Performed by: PHYSICIAN ASSISTANT

## 2024-12-12 PROCEDURE — 25000003 PHARM REV CODE 250

## 2024-12-12 PROCEDURE — 87502 INFLUENZA DNA AMP PROBE: CPT | Performed by: NURSE PRACTITIONER

## 2024-12-12 PROCEDURE — 81001 URINALYSIS AUTO W/SCOPE: CPT | Performed by: NURSE PRACTITIONER

## 2024-12-12 PROCEDURE — 85025 COMPLETE CBC W/AUTO DIFF WBC: CPT | Performed by: NURSE PRACTITIONER

## 2024-12-12 PROCEDURE — 93010 ELECTROCARDIOGRAM REPORT: CPT | Mod: ,,, | Performed by: INTERNAL MEDICINE

## 2024-12-12 PROCEDURE — 85652 RBC SED RATE AUTOMATED: CPT

## 2024-12-12 PROCEDURE — 63600175 PHARM REV CODE 636 W HCPCS

## 2024-12-12 PROCEDURE — 93005 ELECTROCARDIOGRAM TRACING: CPT

## 2024-12-12 PROCEDURE — 87635 SARS-COV-2 COVID-19 AMP PRB: CPT | Performed by: NURSE PRACTITIONER

## 2024-12-12 PROCEDURE — 99285 EMERGENCY DEPT VISIT HI MDM: CPT | Mod: 25

## 2024-12-12 PROCEDURE — 80053 COMPREHEN METABOLIC PANEL: CPT | Performed by: NURSE PRACTITIONER

## 2024-12-12 RX ORDER — DIPHENHYDRAMINE HYDROCHLORIDE 50 MG/ML
25 INJECTION INTRAMUSCULAR; INTRAVENOUS
Status: COMPLETED | OUTPATIENT
Start: 2024-12-12 | End: 2024-12-12

## 2024-12-12 RX ORDER — IBUPROFEN 200 MG
16 TABLET ORAL
Status: DISCONTINUED | OUTPATIENT
Start: 2024-12-13 | End: 2024-12-18 | Stop reason: HOSPADM

## 2024-12-12 RX ORDER — ENOXAPARIN SODIUM 100 MG/ML
40 INJECTION SUBCUTANEOUS EVERY 24 HOURS
Status: DISCONTINUED | OUTPATIENT
Start: 2024-12-13 | End: 2024-12-18 | Stop reason: HOSPADM

## 2024-12-12 RX ORDER — TALC
6 POWDER (GRAM) TOPICAL NIGHTLY PRN
Status: DISCONTINUED | OUTPATIENT
Start: 2024-12-13 | End: 2024-12-18 | Stop reason: HOSPADM

## 2024-12-12 RX ORDER — INSULIN ASPART 100 [IU]/ML
0-5 INJECTION, SOLUTION INTRAVENOUS; SUBCUTANEOUS
Status: DISCONTINUED | OUTPATIENT
Start: 2024-12-13 | End: 2024-12-18 | Stop reason: HOSPADM

## 2024-12-12 RX ORDER — POLYETHYLENE GLYCOL 3350 17 G/17G
17 POWDER, FOR SOLUTION ORAL DAILY PRN
Status: DISCONTINUED | OUTPATIENT
Start: 2024-12-13 | End: 2024-12-18 | Stop reason: HOSPADM

## 2024-12-12 RX ORDER — IPRATROPIUM BROMIDE AND ALBUTEROL SULFATE 2.5; .5 MG/3ML; MG/3ML
3 SOLUTION RESPIRATORY (INHALATION) EVERY 4 HOURS PRN
Status: DISCONTINUED | OUTPATIENT
Start: 2024-12-13 | End: 2024-12-18 | Stop reason: HOSPADM

## 2024-12-12 RX ORDER — IBUPROFEN 200 MG
24 TABLET ORAL
Status: DISCONTINUED | OUTPATIENT
Start: 2024-12-13 | End: 2024-12-18 | Stop reason: HOSPADM

## 2024-12-12 RX ORDER — BISACODYL 10 MG/1
10 SUPPOSITORY RECTAL DAILY PRN
Status: DISCONTINUED | OUTPATIENT
Start: 2024-12-13 | End: 2024-12-18 | Stop reason: HOSPADM

## 2024-12-12 RX ORDER — GLUCAGON 1 MG
1 KIT INJECTION
Status: DISCONTINUED | OUTPATIENT
Start: 2024-12-13 | End: 2024-12-18 | Stop reason: HOSPADM

## 2024-12-12 RX ORDER — ACETAMINOPHEN 325 MG/1
650 TABLET ORAL EVERY 4 HOURS PRN
Status: DISCONTINUED | OUTPATIENT
Start: 2024-12-13 | End: 2024-12-18 | Stop reason: HOSPADM

## 2024-12-12 RX ORDER — VANCOMYCIN 2 GRAM/500 ML IN 0.9 % SODIUM CHLORIDE INTRAVENOUS
15
Status: COMPLETED | OUTPATIENT
Start: 2024-12-12 | End: 2024-12-12

## 2024-12-12 RX ORDER — CIPROFLOXACIN 2 MG/ML
400 INJECTION, SOLUTION INTRAVENOUS ONCE
Status: COMPLETED | OUTPATIENT
Start: 2024-12-12 | End: 2024-12-13

## 2024-12-12 RX ADMIN — CIPROFLOXACIN 400 MG: 2 INJECTION, SOLUTION INTRAVENOUS at 11:12

## 2024-12-12 RX ADMIN — DIPHENHYDRAMINE HYDROCHLORIDE 25 MG: 50 INJECTION, SOLUTION INTRAMUSCULAR; INTRAVENOUS at 09:12

## 2024-12-12 RX ADMIN — VANCOMYCIN HYDROCHLORIDE 2000 MG: 500 INJECTION, POWDER, LYOPHILIZED, FOR SOLUTION INTRAVENOUS at 09:12

## 2024-12-12 NOTE — ED PROVIDER NOTES
Encounter Date: 12/12/2024       History     Chief Complaint   Patient presents with    multiple complaints     Pt arrived to ED from home with complaint of generalized weakness, chills and nausea. Pt states that he had a recent fall and hasn't felt well since. Pt has hx of htn and DM.      Patient is a 59-year-old with past medical history of diabetes, coronary artery disease, HTN presenting due to generalized weakness, chills, nausea, cough, nasal congestion.  Patient states that starting Tuesday he began to feel generally sick with increased fatigue, weakness nasal congestion and a cough.  He suffered a fall that day due to generalized weakness.  Denies LOC, head trauma, neck pain, back pain.  States that he caught himself with his arms.  States that when he stands up and takes 4-5 steps he starts to feel generalized weakness.  Reports that his appetite has been decreased over this week.  States that he has been unable to keep food down.  Able to drink water but has been decreasing his overall intake due to his general nausea.  States that this morning and last night he felt cold when he was laying in bed.  He was sweaty during this period.  Denies any chest pain, shortness of breath, changes in bowel movements, changes in urination.  Denies any blood in his vomit.        Review of patient's allergies indicates:   Allergen Reactions    Penicillins Other (See Comments)     PCN allergy as a child - was told he went into a coma. Tolerates Cefazolin without adverse reactions    Shellfish containing products      Other reaction(s): Unknown    Vancomycin Itching     Tolerated vancomycin 7/2020    Bactrim  [sulfamethoxazole-trimethoprim] Rash     Past Medical History:   Diagnosis Date    Allergy     CAD (coronary artery disease), native coronary artery 6/25/2013    Cancer     COVID-19 virus detected 9/1/2020    Diabetes mellitus     Diabetes mellitus, type 2     Disorder of kidney and ureter     Heart attack 04/2012     Hx of colon cancer, stage I     Hyperlipidemia     Hypertension     Muscular pain     post-op after colonoscopy    NSTEMI May 2013 - peak troponin 0.22 5/22/2013    MICHAELA (obstructive sleep apnea)     Retinopathy due to secondary diabetes      Past Surgical History:   Procedure Laterality Date    COLONOSCOPY N/A 2/17/2017    Procedure: COLONOSCOPY;  Surgeon: Simon Gomez MD;  Location: [facility] (4TH FLR);  Service: Endoscopy;  Laterality: N/A;    CORONARY ANGIOPLASTY      EXCISION, TARSAL OR METATARSAL BONE, EXCLUDING TALUS OR CALCANEUS, PARTIAL Right 12/6/2024    Procedure: EXCISION,TARSAL OR METATARSAL BONE,EXCLUDING TALUS OR CALCANEUS,PARTIAL;  Surgeon: Mingo Melara DPM;  Location: [facility] OR;  Service: Podiatry;  Laterality: Right;    INCISION AND DRAINAGE FOOT Right 6/5/2018    Procedure: INCISION AND DRAINAGE, FOOT;  Surgeon: Bridget Santana DPM;  Location: [facility] OR 1ST FLR;  Service: Podiatry;  Laterality: Right;  Request mini C arm in room. request wound vac with medium black sponge    INCISION AND DRAINAGE FOOT Right 6/7/2018    Procedure: INCISION AND DRAINAGE, FOOT;  Surgeon: Emelia Brock DPM;  Location: [facility] OR 2ND FLR;  Service: Podiatry;  Laterality: Right;    INCISION AND DRAINAGE FOOT Left 9/4/2020    Procedure: INCISION AND DRAINAGE, FOOT;  Surgeon: Ainsley Powell DPM;  Location: [facility] OR 2ND FLR;  Service: Podiatry;  Laterality: Left;    INCISION AND DRAINAGE FOOT Left 12/22/2020    Procedure: INCISION AND DRAINAGE, FOOT;  Surgeon: Ainsley Powell DPM;  Location: [facility] OR 2ND FLR;  Service: Podiatry;  Laterality: Left;  May need micro choice  Need Jam shidi needles  Stretcher OK      INCISION AND DRAINAGE OF ABSCESS Right 6/2/2018    Procedure: INCISION AND DRAINAGE, ABSCESS;  Surgeon: Bridget Santana DPM;  Location: [facility] OR 2ND FLR;  Service: General;  Laterality: Right;    OSTEOTOMY OF METATARSAL BONE  9/4/2020    Procedure: OSTEOTOMY, METATARSAL BONE, 1st METATARSAL RESECTION;   Surgeon: Ainsley Powell DPM;  Location: [facility] OR 2ND FLR;  Service: Podiatry;;    REMOVAL OF FOREIGN BODY FROM FOOT Right 6/7/2018    Procedure: REMOVAL, FOREIGN BODY, FOOT;  Surgeon: Emelia Brock DPM;  Location: [facility] OR 2ND FLR;  Service: Podiatry;  Laterality: Right;    TOE AMPUTATION Left 7/4/2020    Procedure: AMPUTATION, TOE;  Surgeon: Bridget Santana DPM;  Location: [facility] OR 2ND FLR;  Service: Podiatry;  Laterality: Left;    TOE AMPUTATION Left 10/14/2020    Procedure: AMPUTATION, TOE;  Surgeon: Mingo Melara DPM;  Location: [facility] OR 2ND FLR;  Service: Podiatry;  Laterality: Left;  stretcher OK    TOE AMPUTATION Right 6/9/2022    Procedure: AMPUTATION, TOE - Dr. Melara @ [facility] in am;  Surgeon: Mingo Melara DPM;  Location: [facility] OR 1ST FLR;  Service: Podiatry;  Laterality: Right;    TOE AMPUTATION Right 11/23/2022    Procedure: AMPUTATION, TOE;  Surgeon: Hansa Robertson DPM;  Location: [facility] OR 2ND FLR;  Service: Podiatry;  Laterality: Right;    TOE AMPUTATION Right 11/25/2022    Procedure: AMPUTATION, TOE;  Surgeon: Chris Groves DPM;  Location: [facility] OR 2ND FLR;  Service: Podiatry;  Laterality: Right;    TONSILLECTOMY       Family History   Problem Relation Name Age of Onset    Heart disease Mother      Diabetes Mother      Diabetes Father      Heart disease Brother      Diabetes Maternal Grandmother      Diabetes Maternal Grandfather      Diabetes Paternal Grandmother      Diabetes Paternal Grandfather      Anesthesia problems Neg Hx       Social History     Tobacco Use    Smoking status: Never     Passive exposure: Never    Smokeless tobacco: Never   Substance Use Topics    Alcohol use: Yes     Comment: rare x1 beer-     Drug use: No     Review of Systems  Per HPI  Physical Exam     Initial Vitals [12/12/24 1446]   BP Pulse Resp Temp SpO2   (!) 145/73 83 16 99.3 °F (37.4 °C) (!) 94 %      MAP       --         Physical Exam    Constitutional: He appears well-developed and well-nourished.  He is not diaphoretic. No distress.   HENT:   Head: Normocephalic and atraumatic.   Eyes: No scleral icterus.   Cardiovascular:  Normal rate, regular rhythm and normal heart sounds.           No murmur heard.  Pulmonary/Chest: Breath sounds normal. No respiratory distress. He has no wheezes. He has no rhonchi. He has no rales.   Abdominal: Abdomen is soft. He exhibits no distension. There is no abdominal tenderness. There is no rebound and no guarding.   Musculoskeletal:      Comments: No cervical spine tenderness.    Bilateral lower extremity redness that is more pronounced in the right side.  Has been imaging on his right foot.  No tenderness to palpation, swelling, discharge coming from either leg.    Bandaging taken off of right foot.  Wound as depicted in media section.  Discharge/erythema coming of surgical wound site concerning for infection     Neurological: He is alert. GCS score is 15. GCS eye subscore is 4. GCS verbal subscore is 5. GCS motor subscore is 6.   5+ strength in bilateral upper extremities.   Skin: Skin is warm and dry.   Psychiatric: He has a normal mood and affect.         ED Course   Procedures  Labs Reviewed   COMPREHENSIVE METABOLIC PANEL - Abnormal       Result Value    Sodium 130 (*)     Potassium 4.9      Chloride 96      CO2 22 (*)     Glucose 298 (*)     BUN 19      Creatinine 1.4      Calcium 8.6 (*)     Total Protein 7.5      Albumin 2.6 (*)     Total Bilirubin 0.7      Alkaline Phosphatase 90      AST 23      ALT 21      eGFR 57.5 (*)     Anion Gap 12     CBC W/ AUTO DIFFERENTIAL - Abnormal    WBC 9.54      RBC 4.84      Hemoglobin 13.7 (*)     Hematocrit 42.2      MCV 87      MCH 28.3      MCHC 32.5      RDW 13.2      Platelets 284      MPV 10.5      Immature Granulocytes 0.4      Gran # (ANC) 7.3      Immature Grans (Abs) 0.04      Lymph # 0.6 (*)     Mono # 1.4 (*)     Eos # 0.1      Baso # 0.04      nRBC 0      Gran % 76.6 (*)     Lymph % 6.7 (*)     Mono % 15.1 (*)      Eosinophil % 0.8      Basophil % 0.4      Differential Method Automated     URINALYSIS, REFLEX TO URINE CULTURE - Abnormal    Specimen UA Urine, Clean Catch      Color, UA Yellow      Appearance, UA Clear      pH, UA 6.0      Specific Gravity, UA 1.030      Protein, UA 2+ (*)     Glucose, UA 4+ (*)     Ketones, UA Trace (*)     Bilirubin (UA) Negative      Occult Blood UA Trace (*)     Nitrite, UA Negative      Leukocytes, UA Negative      Narrative:     Specimen Source->Urine   URINALYSIS MICROSCOPIC - Abnormal    RBC, UA 4      WBC, UA 1      Bacteria Occasional      Yeast, UA None      Squam Epithel, UA 0      Hyaline Casts, UA 26 (*)     Microscopic Comment SEE COMMENT      Narrative:     Specimen Source->Urine   SEDIMENTATION RATE - Abnormal    Sed Rate 83 (*)    C-REACTIVE PROTEIN - Abnormal    .2 (*)    INFLUENZA A & B BY MOLECULAR    Influenza A, Molecular Negative      Influenza B, Molecular Negative      Flu A & B Source Nasal swab     CULTURE, BLOOD   CULTURE, BLOOD   HEPATITIS C ANTIBODY    Hepatitis C Ab Non-reactive      Narrative:     Release to patient->Immediate   HIV 1 / 2 ANTIBODY    HIV 1/2 Ag/Ab Non-reactive      Narrative:     Release to patient->Immediate   SARS-COV-2 RNA AMPLIFICATION, QUAL    SARS-CoV-2 RNA, Amplification, Qual Negative     POCT GLUCOSE MONITORING CONTINUOUS          Imaging Results              X-Ray Foot Complete Right (Final result)  Result time 12/12/24 23:03:21      Final result by Pasha Hermosillo DO (12/12/24 23:03:21)                   Impression:      No radiographic evidence of acute osteomyelitis.  Soft tissue gas of the dorsal aspect of the 2nd toe as above, may be postoperative.  Infection with gas-forming organism not excluded.      Electronically signed by: Pasha Hermosillo  Date:    12/12/2024  Time:    23:03               Narrative:    EXAMINATION:  XR FOOT COMPLETE 3 VIEW RIGHT    CLINICAL HISTORY:  . Osteomyelitis, unspecified    TECHNIQUE:  AP,  lateral, and oblique views of the right foot were performed.    COMPARISON:  12/06/2024.    FINDINGS:  There are postop changes of 2nd, 3rd, and 5th toe amputations, with resection of the 2nd toe metatarsal head and neck, resection at the 3rd toe metatarsophalangeal joint, and resection at the 5th toe distal phalangeal shaft.  There is no radiographic evidence of acute osteomyelitis.  There is soft tissue gas overlying the 2nd toe metatarsal phalangeal joint region.  There is soft tissue edema of the 2nd toe.  Remaining osseous structures are intact.  There are dorsal osteophytes.  There is soft tissue edema of the dorsum of the foot.  There are vascular calcifications.                                       X-Ray Chest AP Portable (Final result)  Result time 12/12/24 19:28:46      Final result by Simon Reyes MD (12/12/24 19:28:46)                   Impression:      No radiographic evidence of pneumonia or other source of cough/shortness of breath, noting that early/mild viral pneumonia or nonspecific bronchitis may be radiographically occult.      Electronically signed by: Simon Reyes MD  Date:    12/12/2024  Time:    19:28               Narrative:    EXAMINATION:  XR CHEST AP PORTABLE    CLINICAL HISTORY:  Cough, unspecified    TECHNIQUE:  Single frontal view of the chest was performed.    COMPARISON:  Chest radiograph 11/28/2022, chest CT 03/31/2017    FINDINGS:  Patient is slightly rotated.  Resolution is limited by body habitus with underpenetration.    No detrimental change.  Cardiomediastinal silhouette is midline and within normal limits for age, stable.  Bibasilar minimal platelike scarring versus atelectasis.  Pulmonary vasculature and hilar contours are within normal limits.  The lungs are otherwise normal to slightly hypoexpanded without consolidation, pleural effusion or pneumothorax identified.  No acute osseous process seen.  PA and lateral views can be obtained.                                        Medications   vancomycin 2 g in 0.9% sodium chloride 500 mL IVPB (0 mg Intravenous Stopped 12/12/24 2324)   ciprofloxacin (CIPRO)400mg/200ml D5W IVPB 400 mg (has no administration in time range)   enoxaparin injection 40 mg (has no administration in time range)   albuterol-ipratropium 2.5 mg-0.5 mg/3 mL nebulizer solution 3 mL (has no administration in time range)   melatonin tablet 6 mg (has no administration in time range)   polyethylene glycol packet 17 g (has no administration in time range)   bisacodyL suppository 10 mg (has no administration in time range)   acetaminophen tablet 650 mg (has no administration in time range)   glucose chewable tablet 16 g (has no administration in time range)   glucose chewable tablet 24 g (has no administration in time range)   glucagon (human recombinant) injection 1 mg (has no administration in time range)   insulin aspart U-100 pen 0-5 Units (has no administration in time range)   dextrose 10% bolus 125 mL 125 mL (has no administration in time range)   dextrose 10% bolus 250 mL 250 mL (has no administration in time range)   diphenhydrAMINE injection 25 mg (25 mg Intravenous Given 12/12/24 2123)     Medical Decision Making  Patient is a 59 year old afebrile, hemodynamically stable, in no acute distress male presenting due to malaise, cough, nasal congestion, decreased appetite\  .    DDX: Soft tissue infection, osteomyelitis, postop infection, abscess, flu, COVID, pneumonia    Chest x-ray not showing any significant signs of focal consolidation blunting of the costophrenic angles were increased reticular opacities.  Low suspicion for pneumonia.  Viral testing negative.  Exam of patient's surgical site of his right foot concerning for possible wound infection.  Podiatry consulted.  Podiatry recommended starting an IV antibiotics, obtaining imaging of his foot.  Plan is to admit patient for IV antibiotics and Podiatry consultation for likely soft tissue infection around  the right foot.    Amount and/or Complexity of Data Reviewed  External Data Reviewed: labs and notes.  Labs: ordered. Decision-making details documented in ED Course.  Radiology: ordered. Decision-making details documented in ED Course.    Risk  Prescription drug management.  Decision regarding hospitalization.              Attending Attestation:             Attending ED Notes:   ATTENDING PHYSICIAN ATTESTATION    I have repeated the key portions of the resident's history and physical face to face with the patient, reviewed and agree with the resident medical documentation except as noted below, and supervised and managed the medical care of the patient.        Noel Boles MD  Department of Emergency Medicine        ED Course as of 12/12/24 2317   u Dec 12, 2024   1655 WBC: 9.54 [MB]   1655 Hemoglobin(!): 13.7 [MB]   1728 Protein, UA(!): 2+ [MB]   1728 Glucose, UA(!): 4+ [MB]   1728 Ketones, UA(!): Trace [MB]   1728 Blood, UA(!): Trace [MB]   1728 Hemoglobin(!): 13.7 [MB]   1728 WBC: 9.54 [MB]   1728 Sodium(!): 130 [MB]   1728 eGFR(!): 57.5 [MB]   1728 SpO2(!): 94 % [MB]   1728 BP(!): 145/73 [MB]   2315 X-Ray Chest AP Portable  Chest x-ray not showing significant blunting of costophrenic angles, enlargement of cardiomediastinal silhouette, no increased take it opacities.  Low suspicion for acute cardiopulmonary process.  Per my independent interpretation. [MB]      ED Course User Index  [MB] Billy Ruby MD                             Clinical Impression:  Final diagnoses:  [R55] Syncope  [R05.9] Cough  [M86.9] Osteomyelitis  [R07.9] Chest pain  [L08.9] Soft tissue infection (Primary)          ED Disposition Condition    Admit Stable                Billy Ruby MD  Resident  12/12/24 2317       Noel Boles MD  12/13/24 2226       Noel Boles MD  12/13/24 2227

## 2024-12-12 NOTE — FIRST PROVIDER EVALUATION
Emergency Department TeleTriage Encounter Note      CHIEF COMPLAINT    Chief Complaint   Patient presents with    multiple complaints     Pt arrived to ED from home with complaint of generalized weakness, chills and nausea. Pt states that he had a recent fall and hasn't felt well since. Pt has hx of htn and DM.        VITAL SIGNS   Initial Vitals [12/12/24 1446]   BP Pulse Resp Temp SpO2   (!) 145/73 83 16 99.3 °F (37.4 °C) (!) 94 %      MAP       --            ALLERGIES    Review of patient's allergies indicates:   Allergen Reactions    Penicillins Other (See Comments)     PCN allergy as a child - was told he went into a coma. Tolerates Cefazolin without adverse reactions    Shellfish containing products      Other reaction(s): Unknown    Vancomycin Itching     Tolerated vancomycin 7/2020    Bactrim  [sulfamethoxazole-trimethoprim] Rash       PROVIDER TRIAGE NOTE  Verbal consent for the teletriage evaluation was given by the patient at the start of the evaluation.  All efforts will be made to maintain patient's privacy during the evaluation.      This is a teletriage evaluation of a 59 y.o. male presenting to the ED with c/o possible syncopal episode 3 days ago.  Also reports nausea, vomiting, fatigue, and chills. Limited physical exam via telehealth: The patient is awake, alert, answering questions appropriately and is not in respiratory distress.  As the Teletriage provider, I performed an initial assessment and ordered appropriate labs and imaging studies, if any, to facilitate the patient's care once placed in the ED. Once a room is available, care and a full evaluation will be completed by an alternate ED provider.  Any additional orders and the final disposition will be determined by that provider.  All imaging and labs will not be followed-up by the Teletriage Team, including myself.          ORDERS  Labs Reviewed   HEPATITIS C ANTIBODY   HIV 1 / 2 ANTIBODY       ED Orders (720h ago, onward)      Start  Ordered     Status Ordering Provider    12/12/24 1539 12/12/24 1538  Saline lock IV  Once         Ordered JIM FORD    12/12/24 1539 12/12/24 1538  Orthostatic blood pressure  Once         Ordered JIM FORD    12/12/24 1539 12/12/24 1538  Pulse Oximetry Continuous  Continuous         Ordered JIM FORD    12/12/24 1539 12/12/24 1538  Cardiac Monitoring - Adult  Continuous        Comments: Notify Physician If:    Ordered LILIANAJIM GORE    12/12/24 1539 12/12/24 1538  EKG 12-lead  Once         Ordered JIM FORD    12/12/24 1539 12/12/24 1538  Comprehensive metabolic panel  STAT         Ordered JIM FORD    12/12/24 1539 12/12/24 1538  CBC auto differential  STAT         Ordered JIM FORD    12/12/24 1539 12/12/24 1538  POCT glucose  Once         Ordered JIM FROD    12/12/24 1539 12/12/24 1538  Urinalysis, Reflex to Urine Culture Urine, Clean Catch  STAT         Ordered JIM FORD    12/12/24 1539 12/12/24 1538  COVID-19 Rapid Screening  STAT         Ordered JIM FORD    12/12/24 1539 12/12/24 1538  Influenza A & B by Molecular  Once         Ordered JIM FORD    12/12/24 1450 12/12/24 1449  Hepatitis C Antibody  STAT         Acknowledged PÉREZ ROMERO    12/12/24 1450 12/12/24 1449  HIV 1/2 Ag/Ab (4th Gen)  STAT         Acknowledged PÉREZ ROMERO              Virtual Visit Note: The provider triage portion of this emergency department evaluation and documentation was performed via [employer], a HIPAA-compliant telemedicine application, in concert with a tele-presenter in the room. A face to face patient evaluation with one of my colleagues will occur once the patient is placed in an emergency department room.      DISCLAIMER: This note was prepared with [employer] voice recognition transcription software. Garbled syntax, mangled pronouns, and other bizarre constructions may be attributed to that software system.

## 2024-12-12 NOTE — Clinical Note
Diagnosis: Syncope [206001]   Future Attending Provider: IADA CONDON [[id number]]   Reason for IP Medical Treatment  (Clinical interventions that can only be accomplished in the IP setting? ) :: Suspected soft tissue infection, surgery 6 days ago   Plans for Post-Acute care--if anticipated (pick the single best option):: A. No post acute care anticipated at this time   Special Needs:: No Special Needs [1]

## 2024-12-13 PROBLEM — N17.9 AKI (ACUTE KIDNEY INJURY): Status: ACTIVE | Noted: 2024-12-13

## 2024-12-13 PROBLEM — M86.271 SUBACUTE OSTEOMYELITIS OF RIGHT FOOT: Status: ACTIVE | Noted: 2022-06-08

## 2024-12-13 PROBLEM — E08.621 DIABETIC FOOT ULCER ASSOCIATED WITH DIABETES MELLITUS DUE TO UNDERLYING CONDITION: Status: ACTIVE | Noted: 2020-12-21

## 2024-12-13 PROBLEM — L97.509 DIABETIC FOOT ULCER ASSOCIATED WITH DIABETES MELLITUS DUE TO UNDERLYING CONDITION: Status: ACTIVE | Noted: 2020-12-21

## 2024-12-13 LAB
ANION GAP SERPL CALC-SCNC: 11 MMOL/L (ref 8–16)
BASOPHILS # BLD AUTO: 0.04 K/UL (ref 0–0.2)
BASOPHILS NFR BLD: 0.6 % (ref 0–1.9)
BUN SERPL-MCNC: 19 MG/DL (ref 6–20)
CALCIUM SERPL-MCNC: 8.2 MG/DL (ref 8.7–10.5)
CHLORIDE SERPL-SCNC: 97 MMOL/L (ref 95–110)
CO2 SERPL-SCNC: 22 MMOL/L (ref 23–29)
CREAT SERPL-MCNC: 1.2 MG/DL (ref 0.5–1.4)
DIFFERENTIAL METHOD BLD: ABNORMAL
EOSINOPHIL # BLD AUTO: 0 K/UL (ref 0–0.5)
EOSINOPHIL NFR BLD: 0.4 % (ref 0–8)
ERYTHROCYTE [DISTWIDTH] IN BLOOD BY AUTOMATED COUNT: 13.3 % (ref 11.5–14.5)
EST. GFR  (NO RACE VARIABLE): >60 ML/MIN/1.73 M^2
GLUCOSE SERPL-MCNC: 292 MG/DL (ref 70–110)
GRAM STN SPEC: NORMAL
GRAM STN SPEC: NORMAL
HCT VFR BLD AUTO: 39 % (ref 40–54)
HGB BLD-MCNC: 13.4 G/DL (ref 14–18)
IMM GRANULOCYTES # BLD AUTO: 0.04 K/UL (ref 0–0.04)
IMM GRANULOCYTES NFR BLD AUTO: 0.6 % (ref 0–0.5)
LYMPHOCYTES # BLD AUTO: 0.7 K/UL (ref 1–4.8)
LYMPHOCYTES NFR BLD: 9.7 % (ref 18–48)
MAGNESIUM SERPL-MCNC: 1.5 MG/DL (ref 1.6–2.6)
MCH RBC QN AUTO: 29.3 PG (ref 27–31)
MCHC RBC AUTO-ENTMCNC: 34.4 G/DL (ref 32–36)
MCV RBC AUTO: 85 FL (ref 82–98)
MONOCYTES # BLD AUTO: 1 K/UL (ref 0.3–1)
MONOCYTES NFR BLD: 14.7 % (ref 4–15)
NEUTROPHILS # BLD AUTO: 5.2 K/UL (ref 1.8–7.7)
NEUTROPHILS NFR BLD: 74 % (ref 38–73)
NRBC BLD-RTO: 0 /100 WBC
OHS QRS DURATION: 86 MS
OHS QTC CALCULATION: 415 MS
PHOSPHATE SERPL-MCNC: 2.6 MG/DL (ref 2.7–4.5)
PLATELET # BLD AUTO: 253 K/UL (ref 150–450)
PMV BLD AUTO: 9.7 FL (ref 9.2–12.9)
POCT GLUCOSE: 315 MG/DL (ref 70–110)
POCT GLUCOSE: 323 MG/DL (ref 70–110)
POCT GLUCOSE: 334 MG/DL (ref 70–110)
POCT GLUCOSE: 344 MG/DL (ref 70–110)
POCT GLUCOSE: 349 MG/DL (ref 70–110)
POTASSIUM SERPL-SCNC: 4.2 MMOL/L (ref 3.5–5.1)
RBC # BLD AUTO: 4.58 M/UL (ref 4.6–6.2)
SODIUM SERPL-SCNC: 130 MMOL/L (ref 136–145)
WBC # BLD AUTO: 7 K/UL (ref 3.9–12.7)

## 2024-12-13 PROCEDURE — 87075 CULTR BACTERIA EXCEPT BLOOD: CPT

## 2024-12-13 PROCEDURE — 99024 POSTOP FOLLOW-UP VISIT: CPT | Mod: GC,,, | Performed by: PODIATRIST

## 2024-12-13 PROCEDURE — 25000003 PHARM REV CODE 250: Performed by: PHYSICIAN ASSISTANT

## 2024-12-13 PROCEDURE — 87205 SMEAR GRAM STAIN: CPT

## 2024-12-13 PROCEDURE — 87186 SC STD MICRODIL/AGAR DIL: CPT

## 2024-12-13 PROCEDURE — 87186 SC STD MICRODIL/AGAR DIL: CPT | Mod: 59 | Performed by: PHYSICIAN ASSISTANT

## 2024-12-13 PROCEDURE — 63600175 PHARM REV CODE 636 W HCPCS: Performed by: INTERNAL MEDICINE

## 2024-12-13 PROCEDURE — 84100 ASSAY OF PHOSPHORUS: CPT | Performed by: PHYSICIAN ASSISTANT

## 2024-12-13 PROCEDURE — 21400001 HC TELEMETRY ROOM

## 2024-12-13 PROCEDURE — 87070 CULTURE OTHR SPECIMN AEROBIC: CPT

## 2024-12-13 PROCEDURE — 80048 BASIC METABOLIC PNL TOTAL CA: CPT | Performed by: PHYSICIAN ASSISTANT

## 2024-12-13 PROCEDURE — 87150 DNA/RNA AMPLIFIED PROBE: CPT | Performed by: PHYSICIAN ASSISTANT

## 2024-12-13 PROCEDURE — 63600175 PHARM REV CODE 636 W HCPCS: Performed by: PHYSICIAN ASSISTANT

## 2024-12-13 PROCEDURE — 85025 COMPLETE CBC W/AUTO DIFF WBC: CPT | Performed by: PHYSICIAN ASSISTANT

## 2024-12-13 PROCEDURE — 87077 CULTURE AEROBIC IDENTIFY: CPT

## 2024-12-13 PROCEDURE — 83735 ASSAY OF MAGNESIUM: CPT | Performed by: PHYSICIAN ASSISTANT

## 2024-12-13 PROCEDURE — 87040 BLOOD CULTURE FOR BACTERIA: CPT | Performed by: PHYSICIAN ASSISTANT

## 2024-12-13 PROCEDURE — 25000003 PHARM REV CODE 250: Performed by: INTERNAL MEDICINE

## 2024-12-13 PROCEDURE — 87077 CULTURE AEROBIC IDENTIFY: CPT | Mod: 59 | Performed by: PHYSICIAN ASSISTANT

## 2024-12-13 RX ORDER — AMLODIPINE BESYLATE 10 MG/1
10 TABLET ORAL DAILY
Status: DISCONTINUED | OUTPATIENT
Start: 2024-12-13 | End: 2024-12-18 | Stop reason: HOSPADM

## 2024-12-13 RX ORDER — CARVEDILOL 12.5 MG/1
12.5 TABLET ORAL 2 TIMES DAILY
Status: DISCONTINUED | OUTPATIENT
Start: 2024-12-13 | End: 2024-12-14

## 2024-12-13 RX ORDER — INSULIN ASPART 100 [IU]/ML
7 INJECTION, SOLUTION INTRAVENOUS; SUBCUTANEOUS
Status: DISCONTINUED | OUTPATIENT
Start: 2024-12-13 | End: 2024-12-14

## 2024-12-13 RX ORDER — MAGNESIUM SULFATE HEPTAHYDRATE 40 MG/ML
2 INJECTION, SOLUTION INTRAVENOUS ONCE
Status: COMPLETED | OUTPATIENT
Start: 2024-12-13 | End: 2024-12-13

## 2024-12-13 RX ORDER — INSULIN GLARGINE 100 [IU]/ML
7 INJECTION, SOLUTION SUBCUTANEOUS ONCE
Status: COMPLETED | OUTPATIENT
Start: 2024-12-13 | End: 2024-12-13

## 2024-12-13 RX ORDER — INSULIN ASPART 100 [IU]/ML
8 INJECTION, SOLUTION INTRAVENOUS; SUBCUTANEOUS
Status: DISCONTINUED | OUTPATIENT
Start: 2024-12-13 | End: 2024-12-13

## 2024-12-13 RX ORDER — CIPROFLOXACIN 2 MG/ML
400 INJECTION, SOLUTION INTRAVENOUS
Status: DISCONTINUED | OUTPATIENT
Start: 2024-12-13 | End: 2024-12-15

## 2024-12-13 RX ORDER — ATORVASTATIN CALCIUM 40 MG/1
80 TABLET, FILM COATED ORAL NIGHTLY
Status: DISCONTINUED | OUTPATIENT
Start: 2024-12-13 | End: 2024-12-18 | Stop reason: HOSPADM

## 2024-12-13 RX ORDER — INSULIN ASPART 100 [IU]/ML
6 INJECTION, SOLUTION INTRAVENOUS; SUBCUTANEOUS
Status: DISCONTINUED | OUTPATIENT
Start: 2024-12-13 | End: 2024-12-13

## 2024-12-13 RX ORDER — INSULIN GLARGINE 100 [IU]/ML
20 INJECTION, SOLUTION SUBCUTANEOUS 2 TIMES DAILY
Status: DISCONTINUED | OUTPATIENT
Start: 2024-12-13 | End: 2024-12-13

## 2024-12-13 RX ORDER — INSULIN GLARGINE 100 [IU]/ML
15 INJECTION, SOLUTION SUBCUTANEOUS 2 TIMES DAILY
Status: DISCONTINUED | OUTPATIENT
Start: 2024-12-13 | End: 2024-12-13

## 2024-12-13 RX ORDER — INSULIN GLARGINE 100 [IU]/ML
15 INJECTION, SOLUTION SUBCUTANEOUS 2 TIMES DAILY
Status: CANCELLED | OUTPATIENT
Start: 2024-12-13

## 2024-12-13 RX ORDER — SODIUM CHLORIDE, SODIUM LACTATE, POTASSIUM CHLORIDE, CALCIUM CHLORIDE 600; 310; 30; 20 MG/100ML; MG/100ML; MG/100ML; MG/100ML
INJECTION, SOLUTION INTRAVENOUS CONTINUOUS
Status: ACTIVE | OUTPATIENT
Start: 2024-12-13 | End: 2024-12-13

## 2024-12-13 RX ORDER — SODIUM,POTASSIUM PHOSPHATES 280-250MG
2 POWDER IN PACKET (EA) ORAL ONCE
Status: COMPLETED | OUTPATIENT
Start: 2024-12-13 | End: 2024-12-13

## 2024-12-13 RX ORDER — INSULIN GLARGINE 100 [IU]/ML
25 INJECTION, SOLUTION SUBCUTANEOUS 2 TIMES DAILY
Status: DISCONTINUED | OUTPATIENT
Start: 2024-12-13 | End: 2024-12-14

## 2024-12-13 RX ORDER — DIPHENHYDRAMINE HYDROCHLORIDE 50 MG/ML
25 INJECTION INTRAMUSCULAR; INTRAVENOUS EVERY 12 HOURS PRN
Status: DISCONTINUED | OUTPATIENT
Start: 2024-12-13 | End: 2024-12-18 | Stop reason: HOSPADM

## 2024-12-13 RX ADMIN — CIPROFLOXACIN 400 MG: 2 INJECTION, SOLUTION INTRAVENOUS at 11:12

## 2024-12-13 RX ADMIN — INSULIN ASPART 2 UNITS: 100 INJECTION, SOLUTION INTRAVENOUS; SUBCUTANEOUS at 08:12

## 2024-12-13 RX ADMIN — CARVEDILOL 12.5 MG: 12.5 TABLET, FILM COATED ORAL at 08:12

## 2024-12-13 RX ADMIN — INSULIN ASPART 7 UNITS: 100 INJECTION, SOLUTION INTRAVENOUS; SUBCUTANEOUS at 05:12

## 2024-12-13 RX ADMIN — AMLODIPINE BESYLATE 10 MG: 10 TABLET ORAL at 01:12

## 2024-12-13 RX ADMIN — INSULIN GLARGINE 20 UNITS: 100 INJECTION, SOLUTION SUBCUTANEOUS at 08:12

## 2024-12-13 RX ADMIN — INSULIN GLARGINE 25 UNITS: 100 INJECTION, SOLUTION SUBCUTANEOUS at 08:12

## 2024-12-13 RX ADMIN — INSULIN ASPART 4 UNITS: 100 INJECTION, SOLUTION INTRAVENOUS; SUBCUTANEOUS at 08:12

## 2024-12-13 RX ADMIN — VANCOMYCIN HYDROCHLORIDE 1500 MG: 1.5 INJECTION, POWDER, LYOPHILIZED, FOR SOLUTION INTRAVENOUS at 08:12

## 2024-12-13 RX ADMIN — CIPROFLOXACIN 400 MG: 2 INJECTION, SOLUTION INTRAVENOUS at 10:12

## 2024-12-13 RX ADMIN — ATORVASTATIN CALCIUM 80 MG: 40 TABLET, FILM COATED ORAL at 08:12

## 2024-12-13 RX ADMIN — MAGNESIUM SULFATE HEPTAHYDRATE 2 G: 40 INJECTION, SOLUTION INTRAVENOUS at 09:12

## 2024-12-13 RX ADMIN — INSULIN GLARGINE 7 UNITS: 100 INJECTION, SOLUTION SUBCUTANEOUS at 04:12

## 2024-12-13 RX ADMIN — ATORVASTATIN CALCIUM 80 MG: 40 TABLET, FILM COATED ORAL at 02:12

## 2024-12-13 RX ADMIN — INSULIN ASPART 2 UNITS: 100 INJECTION, SOLUTION INTRAVENOUS; SUBCUTANEOUS at 02:12

## 2024-12-13 RX ADMIN — ENOXAPARIN SODIUM 40 MG: 40 INJECTION SUBCUTANEOUS at 04:12

## 2024-12-13 RX ADMIN — ACETAMINOPHEN 650 MG: 325 TABLET ORAL at 10:12

## 2024-12-13 RX ADMIN — INSULIN ASPART 4 UNITS: 100 INJECTION, SOLUTION INTRAVENOUS; SUBCUTANEOUS at 12:12

## 2024-12-13 RX ADMIN — INSULIN ASPART 4 UNITS: 100 INJECTION, SOLUTION INTRAVENOUS; SUBCUTANEOUS at 05:12

## 2024-12-13 RX ADMIN — POTASSIUM & SODIUM PHOSPHATES POWDER PACK 280-160-250 MG 2 PACKET: 280-160-250 PACK at 09:12

## 2024-12-13 RX ADMIN — SODIUM CHLORIDE, POTASSIUM CHLORIDE, SODIUM LACTATE AND CALCIUM CHLORIDE: 600; 310; 30; 20 INJECTION, SOLUTION INTRAVENOUS at 06:12

## 2024-12-13 RX ADMIN — SODIUM CHLORIDE, POTASSIUM CHLORIDE, SODIUM LACTATE AND CALCIUM CHLORIDE: 600; 310; 30; 20 INJECTION, SOLUTION INTRAVENOUS at 10:12

## 2024-12-13 NOTE — PROCEDURES
"Debridement    Date/Time: 12/13/2024 12:37 PM    Performed by: Juanjose Luna MD  Authorized by: Juanjose Luna MD    Consent Done?:  Yes (Verbal)    Preparation: Patient was prepped and draped in usual sterile fashion    Local anesthesia used?: No      Type of Debridement:  Excisional       Length (cm):  2.5       Width (cm):  3       Depth (cm):  2       Area (sq cm):  7.5       Percent Debrided (%):  95       Total Area Debrided (sq cm):  7.13    Depth of debridement:  Bone    Tissue debrided:  Dermis, Epidermis, Muscle, Subcutaneous, Tendon and Hypergranulation    Devitalized tissue debrided:  Fibrin, Exudate and Slough    Instruments:  Curette, Forceps and Scissors  Bleeding:  Minimal  Hemostasis Achieved: Yes  Method Used:  Pressure  Bone biopsy    Date/Time: 12/13/2024 12:40 PM    Performed by: Juanjose Luna MD  Authorized by: Juanjose Luna MD  Consent: Written consent obtained.  Risks and benefits: risks, benefits and alternatives were discussed  Consent given by: patient  Patient understanding: patient states understanding of the procedure being performed  Patient consent: the patient's understanding of the procedure matches consent given  Test results: test results available and properly labeled  Imaging studies: imaging studies available  Patient identity confirmed: verbally with patient  Time out: Immediately prior to procedure a "time out" was called to verify the correct patient, procedure, equipment, support staff and site/side marked as required.    Sedation:  Patient sedated: no    Patient tolerance: patient tolerated the procedure well with no immediate complications  Comments: 12/13/2024  The site was then prepped and draped in cleanly. Post debridement and suture removal, a jamshidi needle was inserted to extract a small piece of bone from the 3rd metatarsal. The bone was split and sent for cultures and pathology. Hemostasis was achieved with pressure. The wound was packed with non-iodophor " antimicrobial packing and DSD.    With assistance from Rogerio Ross DPM and DAE Astudillo DPM PGY-2  Podiatric Medicine & Surgery  [facility]  Secure Chat Preferred  Pager: [phone]        I have seen the patient, reviewed the Resident's procedure note. I have personally interviewed and examined the patient at bedside and agree with the findings.           Hansa L Hodges, DPM  System Chair, Podiatry Department

## 2024-12-13 NOTE — SUBJECTIVE & OBJECTIVE
Past Medical History:   Diagnosis Date    Allergy     CAD (coronary artery disease), native coronary artery 6/25/2013    Cancer     COVID-19 virus detected 9/1/2020    Diabetes mellitus     Diabetes mellitus, type 2     Disorder of kidney and ureter     Heart attack 04/2012    Hx of colon cancer, stage I     Hyperlipidemia     Hypertension     Muscular pain     post-op after colonoscopy    NSTEMI May 2013 - peak troponin 0.22 5/22/2013    MICHAELA (obstructive sleep apnea)     Retinopathy due to secondary diabetes        Past Surgical History:   Procedure Laterality Date    COLONOSCOPY N/A 2/17/2017    Procedure: COLONOSCOPY;  Surgeon: Simon Gomez MD;  Location: [facility] ENDO (4TH FLR);  Service: Endoscopy;  Laterality: N/A;    CORONARY ANGIOPLASTY      EXCISION, TARSAL OR METATARSAL BONE, EXCLUDING TALUS OR CALCANEUS, PARTIAL Right 12/6/2024    Procedure: EXCISION,TARSAL OR METATARSAL BONE,EXCLUDING TALUS OR CALCANEUS,PARTIAL;  Surgeon: Mingo Melara DPM;  Location: [facility];  Service: Podiatry;  Laterality: Right;    INCISION AND DRAINAGE FOOT Right 6/5/2018    Procedure: INCISION AND DRAINAGE, FOOT;  Surgeon: Bridget Santana DPM;  Location: [facility] 1ST FLR;  Service: Podiatry;  Laterality: Right;  Request mini C arm in room. request wound vac with medium black sponge    INCISION AND DRAINAGE FOOT Right 6/7/2018    Procedure: INCISION AND DRAINAGE, FOOT;  Surgeon: Emelia Brock DPM;  Location: [facility] OR 2ND FLR;  Service: Podiatry;  Laterality: Right;    INCISION AND DRAINAGE FOOT Left 9/4/2020    Procedure: INCISION AND DRAINAGE, FOOT;  Surgeon: Ainsley Powell DPM;  Location: [facility] OR 2ND FLR;  Service: Podiatry;  Laterality: Left;    INCISION AND DRAINAGE FOOT Left 12/22/2020    Procedure: INCISION AND DRAINAGE, FOOT;  Surgeon: Ainsley Powell DPM;  Location: [facility] OR 2ND FLR;  Service: Podiatry;  Laterality: Left;  May need micro choice  Need Jam shidi needles  Stretcher OK      INCISION AND DRAINAGE OF ABSCESS  Right 6/2/2018    Procedure: INCISION AND DRAINAGE, ABSCESS;  Surgeon: Bridget Santana DPM;  Location: [facility] OR 2ND FLR;  Service: General;  Laterality: Right;    OSTEOTOMY OF METATARSAL BONE  9/4/2020    Procedure: OSTEOTOMY, METATARSAL BONE, 1st METATARSAL RESECTION;  Surgeon: Ainsley Powell DPM;  Location: [facility] OR 2ND FLR;  Service: Podiatry;;    REMOVAL OF FOREIGN BODY FROM FOOT Right 6/7/2018    Procedure: REMOVAL, FOREIGN BODY, FOOT;  Surgeon: Emelia Brock DPM;  Location: [facility] OR 2ND FLR;  Service: Podiatry;  Laterality: Right;    TOE AMPUTATION Left 7/4/2020    Procedure: AMPUTATION, TOE;  Surgeon: Bridget Santana DPM;  Location: [facility] OR 2ND FLR;  Service: Podiatry;  Laterality: Left;    TOE AMPUTATION Left 10/14/2020    Procedure: AMPUTATION, TOE;  Surgeon: Mingo Melara DPM;  Location: [facility] OR 2ND FLR;  Service: Podiatry;  Laterality: Left;  stretcher OK    TOE AMPUTATION Right 6/9/2022    Procedure: AMPUTATION, TOE - Dr. Melara @ [place] in am;  Surgeon: Mingo Melara DPM;  Location: [facility] OR 1ST FLR;  Service: Podiatry;  Laterality: Right;    TOE AMPUTATION Right 11/23/2022    Procedure: AMPUTATION, TOE;  Surgeon: Hansa Robertson DPM;  Location: [facility] OR 2ND FLR;  Service: Podiatry;  Laterality: Right;    TOE AMPUTATION Right 11/25/2022    Procedure: AMPUTATION, TOE;  Surgeon: Chris Groves DPM;  Location: [facility] OR 2ND FLR;  Service: Podiatry;  Laterality: Right;    TONSILLECTOMY         Review of patient's allergies indicates:   Allergen Reactions    Penicillins Other (See Comments)     PCN allergy as a child - was told he went into a coma. Tolerates Cefazolin without adverse reactions    Shellfish containing products      Other reaction(s): Unknown    Vancomycin Itching     Tolerated vancomycin 7/2020    Bactrim  [sulfamethoxazole-trimethoprim] Rash       No current facility-administered medications on file prior to encounter.     Current Outpatient Medications on File Prior to  Encounter   Medication Sig    acetaminophen (TYLENOL) 500 MG tablet Take 1,000 mg by mouth daily as needed for Pain.    amLODIPine (NORVASC) 10 MG tablet Take 1 tablet (10 mg total) by mouth once daily.    aspirin (ECOTRIN) 81 MG EC tablet Take 1 tablet (81 mg total) by mouth once daily.    atorvastatin (LIPITOR) 80 MG tablet Take 1 tablet (80 mg total) by mouth every evening.    atorvastatin (LIPITOR) 80 MG tablet Take 80 mg by mouth.    bismuth subsalicylate (PEPTO BISMOL) 262 mg/15 mL suspension Take 15 mLs by mouth daily as needed (diarrhea).    blood sugar diagnostic (ACCU-CHEK GUIDE TEST STRIPS) Strp 1 each by Misc.(Non-Drug; Combo Route) route 5 (five) times daily.    blood-glucose sensor Haley Inject 1 each into the skin every 7 days.    carvediloL (COREG) 12.5 MG tablet Take 0.5 tablets (6.25 mg total) by mouth 2 (two) times daily with meals.    carvediloL (COREG) 12.5 MG tablet Take 12.5 mg by mouth.    ciprofloxacin HCl (CIPRO) 500 MG tablet Take 1 tablet (500 mg total) by mouth every 12 (twelve) hours.    clindamycin (CLEOCIN) 300 MG capsule Take 1 capsule (300 mg total) by mouth every 6 (six) hours.    doxycycline (VIBRAMYCIN) 100 MG Cap Take 1 capsule (100 mg total) by mouth 2 (two) times daily.    insulin aspart, niacinamide, (FIASP U-100 INSULIN) 100 unit/mL Soln Inject 100 Units into the skin continuous. Gives via insulin pump up to 100 units daily. Do not directly inject. Uses as directed. Rate is 1.75 units/hour - carb counts as well for meal time boluses.    insulin lispro 100 unit/mL pen Inject into the skin.    insulin NPH/Reg human (NOVOLIN 70-30 FLEXPEN U-100) 100 unit/mL (70-30) InPn pen Inject 36 units before breakfast and 30 units before dinner    insulin regular 100 unit/mL Inj injection Inject 100 Units into the skin once daily. Inject up to 100u via insulin pump daily    lancets (ACCU-CHEK FASTCLIX LANCET DRUM) Misc 1 each by Misc.(Non-Drug; Combo Route) route Daily.    levoFLOXacin  "(LEVAQUIN) 750 MG tablet Take 1 tablet (750 mg total) by mouth once daily.    lisinopriL (PRINIVIL,ZESTRIL) 40 MG tablet Take 1 tablet (40 mg total) by mouth once daily.    metFORMIN (GLUCOPHAGE) 1000 MG tablet Take 1 tablet (1,000 mg total) by mouth 2 (two) times daily with meals.    multivit-min/folic/vit K/lycop (MEN'S MULTIVITAMIN ORAL) Take 1 tablet by mouth once daily.    multivitamin (MULTIPLE VITAMIN ORAL) Take 1 capsule by mouth.    oxyCODONE-acetaminophen (PERCOCET) 7.5-325 mg per tablet Take 1 tablet by mouth every 6 (six) hours as needed for Pain. (Patient not taking: Reported on 12/9/2024)    pen needle, diabetic 31 gauge x 3/16" Ndle Inject 1 each into the skin 4 (four) times daily.    semaglutide (OZEMPIC) 0.25 mg or 0.5 mg (2 mg/3 mL) pen injector Inject 0.5 mg into the skin every 7 days.    semaglutide (OZEMPIC) 0.25 mg or 0.5 mg (2 mg/3 mL) pen injector Inject 0.5 mg into the skin.    subcutaneous insulin pump (MINIMED 780G INSULIN PUMP) Misc 1 each by Misc.(Non-Drug; Combo Route) route once daily.     Family History       Problem Relation (Age of Onset)    Diabetes Mother, Father, Maternal Grandmother, Maternal Grandfather, Paternal Grandmother, Paternal Grandfather    Heart disease Mother, Brother          Tobacco Use    Smoking status: Never     Passive exposure: Never    Smokeless tobacco: Never   Substance and Sexual Activity    Alcohol use: Yes     Comment: rare x1 beer-     Drug use: No    Sexual activity: Not Currently     Review of Systems   Constitutional:  Positive for activity change, appetite change, chills and fatigue. Negative for fever.   HENT:  Negative for trouble swallowing.    Eyes:  Negative for photophobia and visual disturbance.   Respiratory:  Negative for chest tightness, shortness of breath and wheezing.    Cardiovascular:  Negative for chest pain, palpitations and leg swelling.   Gastrointestinal:  Negative for abdominal pain, constipation, diarrhea, nausea and vomiting. "   Genitourinary:  Negative for dysuria, frequency, hematuria and urgency.   Musculoskeletal:  Negative for arthralgias, back pain and gait problem.   Skin:  Positive for wound. Negative for color change and rash.   Neurological:  Positive for weakness. Negative for dizziness, syncope, light-headedness, numbness and headaches.   Psychiatric/Behavioral:  Negative for agitation and confusion. The patient is not nervous/anxious.      Objective:     Vital Signs (Most Recent):  Temp: 99.4 °F (37.4 °C) (12/12/24 2054)  Pulse: 85 (12/12/24 2054)  Resp: 16 (12/12/24 2054)  BP: (!) 162/74 (12/12/24 2054)  SpO2: 95 % (12/12/24 2054) Vital Signs (24h Range):  Temp:  [99.3 °F (37.4 °C)-100.3 °F (37.9 °C)] 99.4 °F (37.4 °C)  Pulse:  [83-95] 85  Resp:  [16] 16  SpO2:  [94 %-95 %] 95 %  BP: (121-162)/(63-78) 162/74     Weight: 127 kg (280 lb)  Body mass index is 35.95 kg/m².     Physical Exam  Vitals and nursing note reviewed.   Constitutional:       General: He is not in acute distress.     Appearance: He is well-developed. He is obese.   HENT:      Head: Normocephalic and atraumatic.      Mouth/Throat:      Pharynx: No oropharyngeal exudate.   Eyes:      General: No scleral icterus.     Conjunctiva/sclera: Conjunctivae normal.   Cardiovascular:      Rate and Rhythm: Normal rate and regular rhythm.      Heart sounds: Normal heart sounds.   Pulmonary:      Effort: Pulmonary effort is normal. No respiratory distress.      Breath sounds: Normal breath sounds. No wheezing.   Abdominal:      General: Bowel sounds are normal. There is no distension.      Palpations: Abdomen is soft.      Tenderness: There is no abdominal tenderness.   Musculoskeletal:         General: No tenderness. Normal range of motion.      Cervical back: Normal range of motion and neck supple.   Lymphadenopathy:      Cervical: No cervical adenopathy.   Skin:     General: Skin is warm and dry.      Capillary Refill: Capillary refill takes less than 2 seconds.       Findings: Erythema (BLEs) present. No rash.      Comments: Wound with purulent drainage noted to R foot, see media   Neurological:      Mental Status: He is alert and oriented to person, place, and time.      Cranial Nerves: No cranial nerve deficit.      Sensory: No sensory deficit.      Coordination: Coordination normal.   Psychiatric:         Behavior: Behavior normal.         Thought Content: Thought content normal.         Judgment: Judgment normal.                      Significant Labs: All pertinent labs within the past 24 hours have been reviewed.  CBC:   Recent Labs   Lab 12/12/24  1609   WBC 9.54   HGB 13.7*   HCT 42.2        CMP:   Recent Labs   Lab 12/12/24  1609   *   K 4.9   CL 96   CO2 22*   *   BUN 19   CREATININE 1.4   CALCIUM 8.6*   PROT 7.5   ALBUMIN 2.6*   BILITOT 0.7   ALKPHOS 90   AST 23   ALT 21   ANIONGAP 12       Significant Imaging: I have reviewed all pertinent imaging results/findings within the past 24 hours.

## 2024-12-13 NOTE — PLAN OF CARE
Problem: Adult Inpatient Plan of Care  Goal: Plan of Care Review  Outcome: Progressing  Goal: Patient-Specific Goal (Individualized)  Outcome: Progressing  Goal: Absence of Hospital-Acquired Illness or Injury  Outcome: Progressing  Goal: Optimal Comfort and Wellbeing  Outcome: Progressing  Goal: Readiness for Transition of Care  Outcome: Progressing     Problem: Infection  Goal: Absence of Infection Signs and Symptoms  Outcome: Progressing     Problem: Diabetes Comorbidity  Goal: Blood Glucose Level Within Targeted Range  Outcome: Progressing     Problem: Wound  Goal: Optimal Coping  Outcome: Progressing  Goal: Optimal Functional Ability  Outcome: Progressing  Goal: Absence of Infection Signs and Symptoms  Outcome: Progressing  Goal: Improved Oral Intake  Outcome: Progressing  Goal: Optimal Pain Control and Function  Outcome: Progressing  Goal: Skin Health and Integrity  Outcome: Progressing  Goal: Optimal Wound Healing  Outcome: Progressing

## 2024-12-13 NOTE — HPI
Billy Rivera is a 59 y.o. male with a PMHx of T2DM, CAD, HTN, HLD, and right foot wound who presents to [facility] for evaluation of left foot infection. Patient had a chronic right foot wound for which he follows with podiatry. He had surgery intervention on his R metatarsal on 12/5. He was prescribed PO clinda and cipro prophylactially following the surgery. He reports felling generally unwell with chills, decreased appetite,generalized weakness since Tueaday. Reports falling earlier today due to generalized weakness, but was able to catch himself with his arms. No head trauma, neck/back pain or LOC. His R foot has been wrapped since the surgery so he hasn't look at his surgical incision until bandage was taken off in the ED. He has purulent drainage from incision site. Denies any LE wound, chest pain, SOB, N/V, abdominal pain or syncope.     ED: AFVSS. No leukocytosis. Na 130, , Cr 1.4 (baseline). XR, MRI pending. Given IV vanc and cipro.

## 2024-12-13 NOTE — SUBJECTIVE & OBJECTIVE
Scheduled Meds:   atorvastatin  80 mg Oral QHS    ciprofloxacin  400 mg Intravenous Q12H    enoxparin  40 mg Subcutaneous Daily    insulin aspart U-100  8 Units Subcutaneous TIDWM    insulin glargine U-100  20 Units Subcutaneous BID    vancomycin (VANCOCIN) IV (PEDS and ADULTS)  1,500 mg Intravenous Q12H     Continuous Infusions:   lactated ringers   Intravenous Continuous 125 mL/hr at 12/13/24 1053 New Bag at 12/13/24 1053     PRN Meds:  Current Facility-Administered Medications:     acetaminophen, 650 mg, Oral, Q4H PRN    albuterol-ipratropium, 3 mL, Nebulization, Q4H PRN    bisacodyL, 10 mg, Rectal, Daily PRN    dextrose 10%, 12.5 g, Intravenous, PRN    dextrose 10%, 25 g, Intravenous, PRN    diphenhydrAMINE, 25 mg, Intravenous, Q12H PRN    glucagon (human recombinant), 1 mg, Intramuscular, PRN    glucose, 16 g, Oral, PRN    glucose, 24 g, Oral, PRN    insulin aspart U-100, 0-5 Units, Subcutaneous, QID (AC + HS) PRN    melatonin, 6 mg, Oral, Nightly PRN    polyethylene glycol, 17 g, Oral, Daily PRN    Pharmacy to dose Vancomycin consult, , , Once **AND** vancomycin - pharmacy to dose, , Intravenous, pharmacy to manage frequency    Review of patient's allergies indicates:   Allergen Reactions    Penicillins Other (See Comments)     PCN allergy as a child - was told he went into a coma. Tolerates Cefazolin without adverse reactions    Shellfish containing products      Other reaction(s): Unknown    Vancomycin Itching     Tolerated vancomycin 7/2020    Bactrim  [sulfamethoxazole-trimethoprim] Rash        Past Medical History:   Diagnosis Date    Allergy     CAD (coronary artery disease), native coronary artery 6/25/2013    Cancer     COVID-19 virus detected 9/1/2020    Diabetes mellitus     Diabetes mellitus, type 2     Disorder of kidney and ureter     Heart attack 04/2012    Hx of colon cancer, stage I     Hyperlipidemia     Hypertension     Muscular pain     post-op after colonoscopy    NSTEMI May 2013 - peak  troponin 0.22 5/22/2013    MICHAELA (obstructive sleep apnea)     Retinopathy due to secondary diabetes      Past Surgical History:   Procedure Laterality Date    COLONOSCOPY N/A 2/17/2017    Procedure: COLONOSCOPY;  Surgeon: Simon Gomez MD;  Location: [facility] ENDO (4TH FLR);  Service: Endoscopy;  Laterality: N/A;    CORONARY ANGIOPLASTY      EXCISION, TARSAL OR METATARSAL BONE, EXCLUDING TALUS OR CALCANEUS, PARTIAL Right 12/6/2024    Procedure: EXCISION,TARSAL OR METATARSAL BONE,EXCLUDING TALUS OR CALCANEUS,PARTIAL;  Surgeon: Mingo Melara DPM;  Location: [facility] OR;  Service: Podiatry;  Laterality: Right;    INCISION AND DRAINAGE FOOT Right 6/5/2018    Procedure: INCISION AND DRAINAGE, FOOT;  Surgeon: Bridget Santana DPM;  Location: [facility] OR 1ST FLR;  Service: Podiatry;  Laterality: Right;  Request mini C arm in room. request wound vac with medium black sponge    INCISION AND DRAINAGE FOOT Right 6/7/2018    Procedure: INCISION AND DRAINAGE, FOOT;  Surgeon: Emelia Brock DPM;  Location: [facility] OR 2ND FLR;  Service: Podiatry;  Laterality: Right;    INCISION AND DRAINAGE FOOT Left 9/4/2020    Procedure: INCISION AND DRAINAGE, FOOT;  Surgeon: Ainsley Powell DPM;  Location: [facility] OR 2ND FLR;  Service: Podiatry;  Laterality: Left;    INCISION AND DRAINAGE FOOT Left 12/22/2020    Procedure: INCISION AND DRAINAGE, FOOT;  Surgeon: Ainsley Powell DPM;  Location: [facility] OR 2ND FLR;  Service: Podiatry;  Laterality: Left;  May need micro choice  Need Jam shidi needles  Stretcher OK      INCISION AND DRAINAGE OF ABSCESS Right 6/2/2018    Procedure: INCISION AND DRAINAGE, ABSCESS;  Surgeon: Bridget Santana DPM;  Location: [facility] OR 2ND FLR;  Service: General;  Laterality: Right;    OSTEOTOMY OF METATARSAL BONE  9/4/2020    Procedure: OSTEOTOMY, METATARSAL BONE, 1st METATARSAL RESECTION;  Surgeon: Ainsley Powell DPM;  Location: [facility] OR 2ND FLR;  Service: Podiatry;;    REMOVAL OF FOREIGN BODY FROM FOOT Right 6/7/2018    Procedure:  REMOVAL, FOREIGN BODY, FOOT;  Surgeon: Emelia Brock DPM;  Location: [facility] OR 2ND FLR;  Service: Podiatry;  Laterality: Right;    TOE AMPUTATION Left 7/4/2020    Procedure: AMPUTATION, TOE;  Surgeon: Bridget Santana DPM;  Location: [facility] OR 2ND FLR;  Service: Podiatry;  Laterality: Left;    TOE AMPUTATION Left 10/14/2020    Procedure: AMPUTATION, TOE;  Surgeon: Mingo Melara DPM;  Location: [facility] OR 2ND FLR;  Service: Podiatry;  Laterality: Left;  stretcher OK    TOE AMPUTATION Right 6/9/2022    Procedure: AMPUTATION, TOE - Dr. Melara @ [facility] in am;  Surgeon: Mingo Melara DPM;  Location: [facility] OR 1ST FLR;  Service: Podiatry;  Laterality: Right;    TOE AMPUTATION Right 11/23/2022    Procedure: AMPUTATION, TOE;  Surgeon: Hansa Robertson DPM;  Location: [facility] OR 2ND FLR;  Service: Podiatry;  Laterality: Right;    TOE AMPUTATION Right 11/25/2022    Procedure: AMPUTATION, TOE;  Surgeon: Chris Groves DPM;  Location: [facility] OR 2ND FLR;  Service: Podiatry;  Laterality: Right;    TONSILLECTOMY         Family History       Problem Relation (Age of Onset)    Diabetes Mother, Father, Maternal Grandmother, Maternal Grandfather, Paternal Grandmother, Paternal Grandfather    Heart disease Mother, Brother          Tobacco Use    Smoking status: Never     Passive exposure: Never    Smokeless tobacco: Never   Substance and Sexual Activity    Alcohol use: Yes     Comment: rare x1 beer-     Drug use: No    Sexual activity: Not Currently     Review of Systems   Constitutional:  Positive for activity change, appetite change, chills and fatigue. Negative for fever.   Respiratory:  Positive for cough. Negative for shortness of breath.    Cardiovascular:  Negative for leg swelling.   Gastrointestinal:  Negative for nausea and vomiting.   Musculoskeletal:  Negative for joint swelling.   Skin:  Positive for wound. Negative for color change.   Neurological:  Positive for weakness and numbness.     Objective:     Vital Signs  (Most Recent):  Temp: 98.4 °F (36.9 °C) (12/13/24 1132)  Pulse: 87 (12/13/24 1132)  Resp: 18 (12/13/24 1132)  BP: (!) 154/79 (12/13/24 1132)  SpO2: (!) 94 % (12/13/24 1132) Vital Signs (24h Range):  Temp:  [97.6 °F (36.4 °C)-100.3 °F (37.9 °C)] 98.4 °F (36.9 °C)  Pulse:  [83-95] 87  Resp:  [16-18] 18  SpO2:  [94 %-95 %] 94 %  BP: (121-180)/() 154/79     Weight: 126.8 kg (279 lb 9.6 oz)  Body mass index is 35.9 kg/m².    Foot Exam    General  Orientation: alert and oriented to person, place, and time       Right Foot/Ankle     Inspection and Palpation  Tenderness: none   Swelling: (3rd toe)  Skin Exam: callus, drainage, dry skin, skin changes, abnormal color and ulcer; skin not intact, no cellulitis and no erythema     Neurovascular  Dorsalis pedis: 1+  Posterior tibial: doppler  Saphenous nerve sensation: absent  Tibial nerve sensation: absent  Superficial peroneal nerve sensation: absent  Deep peroneal nerve sensation: absent  Sural nerve sensation: absent    Comments  Partial 3rd and 5th ray amputated. Partial 2nd metatarsal amputated with digit left intact.   Incision site at dorsal 2nd metatarsal head noted to contain wound with fibrogranular base, purulence and slough present. Post debridement and suture removal, sanguinous residue was present. Measuring approximately 2.5 x 3.0 x 2.0 cm. Probing to bone. Non tender to palpation.  Negligible malodor.      Left Foot/Ankle      Inspection and Palpation  Tenderness: none   Swelling: none   Skin Exam: callus and dry skin; no drainage, no cellulitis, no ulcer and no erythema     Neurovascular  Dorsalis pedis: 1+  Posterior tibial: doppler  Saphenous nerve sensation: absent  Tibial nerve sensation: absent  Superficial peroneal nerve sensation: absent  Deep peroneal nerve sensation: absent  Sural nerve sensation: absent    Comments  Partial 1st ray amputated, stable and healed.  Scab located at dorsal 3rd digit, plantar right foot, as well as erythema on anterior  leg; otherwise no other discernible wounds in left lower extremity      Laboratory:  CBC:   Recent Labs   Lab 12/13/24  0603   WBC 7.00   RBC 4.58*   HGB 13.4*   HCT 39.0*      MCV 85   MCH 29.3   MCHC 34.4     CMP:   Recent Labs   Lab 12/12/24  1609 12/13/24  0603   * 292*   CALCIUM 8.6* 8.2*   ALBUMIN 2.6*  --    PROT 7.5  --    * 130*   K 4.9 4.2   CO2 22* 22*   CL 96 97   BUN 19 19   CREATININE 1.4 1.2   ALKPHOS 90  --    ALT 21  --    AST 23  --    BILITOT 0.7  --      CRP:   Recent Labs   Lab 12/12/24 2112   .2*     ESR:   Recent Labs   Lab 12/12/24 2112   SEDRATE 83*     Microbiology Results (last 7 days)       Procedure Component Value Units Date/Time    Culture, Anaerobe [[id number]] Collected: 12/13/24 1012    Order Status: Sent Specimen: Bone from Foot, Right Updated: 12/13/24 1205    Gram stain [[id number]] Collected: 12/13/24 1012    Order Status: Sent Specimen: Bone from Foot, Right Updated: 12/13/24 1158    Aerobic culture [[id number]] Collected: 12/13/24 1012    Order Status: Sent Specimen: Bone from Foot, Right Updated: 12/13/24 1158    Blood culture [[id number]] Collected: 12/13/24 0125    Order Status: Completed Specimen: Blood from Peripheral, Hand, Right Updated: 12/13/24 1115     Blood Culture, Routine No Growth to date    Blood culture [[id number]] Collected: 12/13/24 0100    Order Status: Completed Specimen: Blood from Peripheral, Forearm, Left Updated: 12/13/24 1115     Blood Culture, Routine No Growth to date    Blood culture [[id number]]     Order Status: Canceled Specimen: Blood     Blood culture [[id number]]     Order Status: Canceled Specimen: Blood     Influenza A & B by Molecular [[id number]] Collected: 12/12/24 1612    Order Status: Completed Specimen: Nasopharyngeal Swab Updated: 12/12/24 1730     Influenza A, Molecular Negative     Influenza B, Molecular Negative     Flu A & B Source Nasal swab          All pertinent labs reviewed within the last 24  hours.    Diagnostic Results:  I have reviewed all pertinent imaging results/findings within the past 24 hours.    Clinical Findings: Right foot

## 2024-12-13 NOTE — ASSESSMENT & PLAN NOTE
Body mass index is 35.95 kg/m². Morbid obesity complicates all aspects of disease management from diagnostic modalities to treatment.

## 2024-12-13 NOTE — ASSESSMENT & PLAN NOTE
- afebrile without leukocytosis  - inflammatory markers elevated  - XR, MRI confirms osteomyelitis of metatarsal 2 and 3  - on  IV vanc and cipro  - 12/13 - podiatry debrided and took bone Bx. - Staph Aureus - MSSA  - ID consult appreciated  - ABX adjustments per ID

## 2024-12-13 NOTE — ASSESSMENT & PLAN NOTE
- home regimen: insulin 70/30 40U in AM, 30U in PM  - IP regimen: lantus 15U BID + aspart 6U TIDWM  - JOANNA, ACHS accuchecks  Lab Results   Component Value Date    HGBA1C 10.2 (H) 11/27/2024

## 2024-12-13 NOTE — HPI
59 year old male with insulin dependent diabetes, CAD, HLD, with history of prior L foot osteomyelitis s/p left foot hallux amputation, partial left 2nd toe amputation, and right 3rd and 5th toe amputation presented to ER with R foot infection after recent amputation on 12/5/24 with podiatry.    In 9/2024 R foot with new issue ulceration/blister, podiatry was following weekly for debridement and wound care. Cultures 09/12/2024 growing Staph aureus, Serratia marcescens , Citrobacter freundii, and presumptive prevotella/porphyromonas group. Treated with 6 weeks of PO Doxy and Levo. Underwent 2nd metatarsal head resection of right foot on 12/5/24 with podiatry, Dr. Melara. Treated with PO Clindamycin and Cipro following surgery. Presented to ER 12/13 for 4 days of generalized weakness, chills, nausea, cough, decreased appetite, and nasal congestion. Reported fall on 12/10/24 with no head injury or LOC. No trauma to foot. His R foot has been wrapped since surgery, but was unwrapped in ER with redness and purulent drainage from incision site.    On admit, afebrile, otherwise VSS. No leukocytosis. Elevated .2 and ESR 83. Blood cultures 12/13 no growth. MRI R forefoot with cellulitis and high likelihood of osteomyelitis of 2nd metatarsal shaft and 3rd metatarsal shaft. Started on IV Vancomycin and IV Cipro in ER. Podiatry and ID consulted.     Patient reports his foot was not bothering him since surgery, it was mainly his other symptoms of cough, congestion, weakness, and chills. Notes upper respiratory symptoms are improving. No fevers.     History of R foot with MSSA and pasturella in 01/2023, treated with 6 weeks of ceftriaxone. Bone culture of L 5th digit with MSSA (01/2024) treated with 6 weeks of Doxycycline, and he saw ID at that time. Hx of L foot culture (04/2024) growing Staph aureus, Acinetobacter baumannii/haemolyticus, Bacteroidies thetaiotaomicron, bacteroides stercoris, and presumptive  prevotella/porphyromonas group. Hx of L foot culture (06/2024) growing Enterococcusus faecalis, staph aureus, and Prevotella bivia.

## 2024-12-13 NOTE — ASSESSMENT & PLAN NOTE
59-year-old male with PMH CAD, DM2, HLD, HTN, prior pedal amputations presents to ED 12/12/24 for generalized weakness, chills, nausea.  Patient has had recent history with Dr. Mingo Melara DPM for right foot 2nd metatarsal head excision DOS 12/6/24. Podiatry consulted for management of aforementioned problem.     Assessment: Post-surgical infection of right foot incision in setting of DM. Per exam, probe to bone s/p debridement of ulcer. XR of right foot was obtained showing amputation changes of 2nd and 5th rays at the level of metatarsal shafts; no osseous infiltration of infection observed.  MRI of right foot obtained showing OM of the 3rd metatarsal shaft, periostitis versus early OM like changes to the 2nd metatarsal shaft.     Plan:  Imaging reviewed with patient  Bone biopsy obtained of right 3rd metatarsal, sent to micro and pathology. Debridement performed today with suture removal at bedside. Procedure note below  ID consulted, appreciate recommendations.   Antibiotics per ID   Patient right foot was dressed with antimicrobial packing, gauze padding, Kerlix, Ace. Nursing wound care orders to change q.d. while inpatient.  Podiatry will monitor progression. No surgical acute intervention warranted at this time.  Podiatry will follow

## 2024-12-13 NOTE — PROGRESS NOTES
Pharmacokinetic Initial Assessment: IV Vancomycin    Assessment/Plan:    Initiate intravenous vancomycin with loading dose of 2000 mg once followed by a maintenance dose of vancomycin 1500 mg IV every 12 hours  Desired empiric serum trough concentration is 10 to 15 mcg/mL  Draw vancomycin trough level 60 min prior to fourth dose on 12/14/24 at approximately 08:00 or sooner if renal function changes significantly  Pharmacy will continue to follow and monitor vancomycin.      Please contact pharmacy at extension [phone] with any questions regarding this assessment.     Thank you for the consult,   Mike Roa       Patient brief summary:  Billy Rivera is a 59 y.o. male initiated on antimicrobial therapy with IV Vancomycin for treatment of suspected skin & soft tissue infection    Drug Allergies:   Review of patient's allergies indicates:   Allergen Reactions    Penicillins Other (See Comments)     PCN allergy as a child - was told he went into a coma. Tolerates Cefazolin without adverse reactions    Shellfish containing products      Other reaction(s): Unknown    Vancomycin Itching     Tolerated vancomycin 7/2020    Bactrim  [sulfamethoxazole-trimethoprim] Rash       Actual Body Weight:   127 kg    Renal Function:   Estimated Creatinine Clearance: 79.4 mL/min (based on SCr of 1.4 mg/dL).,     Dialysis Method (if applicable):  N/A    CBC (last 72 hours):  Recent Labs   Lab Result Units 12/12/24  1609   WBC K/uL 9.54   Hemoglobin g/dL 13.7*   Hematocrit % 42.2   Platelets K/uL 284   Gran % % 76.6*   Lymph % % 6.7*   Mono % % 15.1*   Eosinophil % % 0.8   Basophil % % 0.4   Differential Method  Automated       Metabolic Panel (last 72 hours):  Recent Labs   Lab Result Units 12/12/24  1609 12/12/24  1650   Sodium mmol/L 130*  --    Potassium mmol/L 4.9  --    Chloride mmol/L 96  --    CO2 mmol/L 22*  --    Glucose mg/dL 298*  --    Glucose, UA   --  4+*   BUN mg/dL 19  --    Creatinine mg/dL 1.4  --    Albumin g/dL  "2.6*  --    Total Bilirubin mg/dL 0.7  --    Alkaline Phosphatase U/L 90  --    AST U/L 23  --    ALT U/L 21  --        Drug levels (last 3 results):  No results for input(s): "VANCOMYCINRA", "VANCORANDOM", "VANCOMYCINPE", "VANCOPEAK", "VANCOMYCINTR", "VANCOTROUGH" in the last 72 hours.    Microbiologic Results:  Microbiology Results (last 7 days)       Procedure Component Value Units Date/Time    Blood culture [[id number]]     Order Status: No result Specimen: Blood     Blood culture [[id number]]     Order Status: No result Specimen: Blood     Blood culture [[id number]] Collected: 12/13/24 0125    Order Status: Sent Specimen: Blood from Peripheral, Hand, Right     Blood culture [[id number]] Collected: 12/13/24 0100    Order Status: Sent Specimen: Blood from Peripheral, Forearm, Left     Influenza A & B by Molecular [[id number]] Collected: 12/12/24 1612    Order Status: Completed Specimen: Nasopharyngeal Swab Updated: 12/12/24 1730     Influenza A, Molecular Negative     Influenza B, Molecular Negative     Flu A & B Source Nasal swab            "

## 2024-12-13 NOTE — HPI
59-year-old male with PMH CAD, DM2, HLD, HTN, prior pedal amputations presents to ED 12/12/24 for generalized weakness, chills, nausea.  Patient has had recent history with Dr. Mingo Melara DPM for right foot 2nd metatarsal head excision DOS 12/6/24.  On encounter, patient states that he had noticed weakness, chills, fevers, nonproductive cough days prior to presentation to the ED and was concerned that the source of infection may have been from his foot.  He had also suffered a recent fall secondary to weakness.  He was admitted to Medicine for IV antibiotics, anticipation for surgical debridement. Patient was initiated on broad-spectrum vancomycin/Cipro with ID consulted.     X-ray of right foot was obtained showing amputation changes of 2nd and 5th rays at the level of metatarsal shafts; no osseous infiltration of infection observed.  MRI of right foot obtained showing OM of the 3rd metatarsal shaft, periostitis versus early OM like changes to the 2nd metatarsal shaft.  Labs on encounter negative for leukocytosis per CBC draw; CMP showing hyponatremia 130, diminished bicarb 22, hypocalcemia 8.2, hypoalbuminemia 2.6.  Inflammatory markers elevated , sed rate 83.  Vital signs stable on encounter.

## 2024-12-13 NOTE — H&P
[facility] - Emergency Dept  [facility] Medicine  History & Physical    Patient Name: Billy Rivera  MRN: [id number]  Patient Class: IP- Inpatient  Admission Date: 12/12/2024  Attending Physician: Simon Olmstead MD   Primary Care Provider: Augie Ruiz MD         Patient information was obtained from patient, past medical records, and ER records.     Subjective:     Principal Problem:Right foot infection    Chief Complaint:   Chief Complaint   Patient presents with    multiple complaints     Pt arrived to ED from home with complaint of generalized weakness, chills and nausea. Pt states that he had a recent fall and hasn't felt well since. Pt has hx of htn and DM.         HPI: Billy Rivera is a 59 y.o. male with a PMHx of T2DM, CAD, HTN, HLD, and right foot wound who presents to [facility] for evaluation of left foot infection. Patient had a chronic right foot wound for which he follows with podiatry. He had surgery intervention on his R metatarsal on 12/5. He was prescribed PO clinda and cipro prophylactially following the surgery. He reports felling generally unwell with chills, decreased appetite,generalized weakness since Tueaday. Reports falling earlier today due to generalized weakness, but was able to catch himself with his arms. No head trauma, neck/back pain or LOC. His R foot has been wrapped since the surgery so he hasn't look at his surgical incision until bandage was taken off in the ED. He has purulent drainage from incision site. Denies any LE wound, chest pain, SOB, N/V, abdominal pain or syncope.     ED: AFVSS. No leukocytosis. Na 130, , Cr 1.4 (baseline). XR, MRI pending. Given IV vanc and cipro.     Past Medical History:   Diagnosis Date    Allergy     CAD (coronary artery disease), native coronary artery 6/25/2013    Cancer     COVID-19 virus detected 9/1/2020    Diabetes mellitus     Diabetes mellitus, type 2     Disorder of kidney and ureter     Heart attack 04/2012    Hx of  colon cancer, stage I     Hyperlipidemia     Hypertension     Muscular pain     post-op after colonoscopy    NSTEMI May 2013 - peak troponin 0.22 5/22/2013    MICHAELA (obstructive sleep apnea)     Retinopathy due to secondary diabetes        Past Surgical History:   Procedure Laterality Date    COLONOSCOPY N/A 2/17/2017    Procedure: COLONOSCOPY;  Surgeon: Simon Gomez MD;  Location: [facility] ENDO (4TH FLR);  Service: Endoscopy;  Laterality: N/A;    CORONARY ANGIOPLASTY      EXCISION, TARSAL OR METATARSAL BONE, EXCLUDING TALUS OR CALCANEUS, PARTIAL Right 12/6/2024    Procedure: EXCISION,TARSAL OR METATARSAL BONE,EXCLUDING TALUS OR CALCANEUS,PARTIAL;  Surgeon: Mingo Melara DPM;  Location: [facility] OR;  Service: Podiatry;  Laterality: Right;    INCISION AND DRAINAGE FOOT Right 6/5/2018    Procedure: INCISION AND DRAINAGE, FOOT;  Surgeon: Bridget Santana DPM;  Location: [facility] OR 1ST FLR;  Service: Podiatry;  Laterality: Right;  Request mini C arm in room. request wound vac with medium black sponge    INCISION AND DRAINAGE FOOT Right 6/7/2018    Procedure: INCISION AND DRAINAGE, FOOT;  Surgeon: Emelia Brock DPM;  Location: [facility] OR 2ND FLR;  Service: Podiatry;  Laterality: Right;    INCISION AND DRAINAGE FOOT Left 9/4/2020    Procedure: INCISION AND DRAINAGE, FOOT;  Surgeon: Ainsley Powell DPM;  Location: [facility] OR 2ND FLR;  Service: Podiatry;  Laterality: Left;    INCISION AND DRAINAGE FOOT Left 12/22/2020    Procedure: INCISION AND DRAINAGE, FOOT;  Surgeon: Ainsley Powell DPM;  Location: [facility] OR 2ND FLR;  Service: Podiatry;  Laterality: Left;  May need micro choice  Need Jam shidi needles  Stretcher OK      INCISION AND DRAINAGE OF ABSCESS Right 6/2/2018    Procedure: INCISION AND DRAINAGE, ABSCESS;  Surgeon: Bridget Santana DPM;  Location: [facility] OR 2ND FLR;  Service: General;  Laterality: Right;    OSTEOTOMY OF METATARSAL BONE  9/4/2020    Procedure: OSTEOTOMY, METATARSAL BONE, 1st METATARSAL RESECTION;  Surgeon:  Ainsley Powell DPM;  Location: [facility] OR 2ND FLR;  Service: Podiatry;;    REMOVAL OF FOREIGN BODY FROM FOOT Right 6/7/2018    Procedure: REMOVAL, FOREIGN BODY, FOOT;  Surgeon: Emelia Brock DPM;  Location: [facility] OR 2ND FLR;  Service: Podiatry;  Laterality: Right;    TOE AMPUTATION Left 7/4/2020    Procedure: AMPUTATION, TOE;  Surgeon: Bridget Santana DPM;  Location: [facility] OR 2ND FLR;  Service: Podiatry;  Laterality: Left;    TOE AMPUTATION Left 10/14/2020    Procedure: AMPUTATION, TOE;  Surgeon: Mingo Melara DPM;  Location: [facility] OR 2ND FLR;  Service: Podiatry;  Laterality: Left;  stretcher OK    TOE AMPUTATION Right 6/9/2022    Procedure: AMPUTATION, TOE - Dr. Melara @ [facility] in am;  Surgeon: Mingo Melara DPM;  Location: [facility] OR 1ST FLR;  Service: Podiatry;  Laterality: Right;    TOE AMPUTATION Right 11/23/2022    Procedure: AMPUTATION, TOE;  Surgeon: Hansa Robertson DPM;  Location: [facility] OR 2ND FLR;  Service: Podiatry;  Laterality: Right;    TOE AMPUTATION Right 11/25/2022    Procedure: AMPUTATION, TOE;  Surgeon: Chris Groves DPM;  Location: [facility] OR 2ND FLR;  Service: Podiatry;  Laterality: Right;    TONSILLECTOMY         Review of patient's allergies indicates:   Allergen Reactions    Penicillins Other (See Comments)     PCN allergy as a child - was told he went into a coma. Tolerates Cefazolin without adverse reactions    Shellfish containing products      Other reaction(s): Unknown    Vancomycin Itching     Tolerated vancomycin 7/2020    Bactrim  [sulfamethoxazole-trimethoprim] Rash       No current facility-administered medications on file prior to encounter.     Current Outpatient Medications on File Prior to Encounter   Medication Sig    acetaminophen (TYLENOL) 500 MG tablet Take 1,000 mg by mouth daily as needed for Pain.    amLODIPine (NORVASC) 10 MG tablet Take 1 tablet (10 mg total) by mouth once daily.    aspirin (ECOTRIN) 81 MG EC tablet Take 1 tablet (81 mg total) by mouth once  daily.    atorvastatin (LIPITOR) 80 MG tablet Take 1 tablet (80 mg total) by mouth every evening.    atorvastatin (LIPITOR) 80 MG tablet Take 80 mg by mouth.    bismuth subsalicylate (PEPTO BISMOL) 262 mg/15 mL suspension Take 15 mLs by mouth daily as needed (diarrhea).    blood sugar diagnostic (ACCU-CHEK GUIDE TEST STRIPS) Strp 1 each by Misc.(Non-Drug; Combo Route) route 5 (five) times daily.    blood-glucose sensor Haley Inject 1 each into the skin every 7 days.    carvediloL (COREG) 12.5 MG tablet Take 0.5 tablets (6.25 mg total) by mouth 2 (two) times daily with meals.    carvediloL (COREG) 12.5 MG tablet Take 12.5 mg by mouth.    ciprofloxacin HCl (CIPRO) 500 MG tablet Take 1 tablet (500 mg total) by mouth every 12 (twelve) hours.    clindamycin (CLEOCIN) 300 MG capsule Take 1 capsule (300 mg total) by mouth every 6 (six) hours.    doxycycline (VIBRAMYCIN) 100 MG Cap Take 1 capsule (100 mg total) by mouth 2 (two) times daily.    insulin aspart, niacinamide, (FIASP U-100 INSULIN) 100 unit/mL Soln Inject 100 Units into the skin continuous. Gives via insulin pump up to 100 units daily. Do not directly inject. Uses as directed. Rate is 1.75 units/hour - carb counts as well for meal time boluses.    insulin lispro 100 unit/mL pen Inject into the skin.    insulin NPH/Reg human (NOVOLIN 70-30 FLEXPEN U-100) 100 unit/mL (70-30) InPn pen Inject 36 units before breakfast and 30 units before dinner    insulin regular 100 unit/mL Inj injection Inject 100 Units into the skin once daily. Inject up to 100u via insulin pump daily    lancets (ACCU-CHEK FASTCLIX LANCET DRUM) Misc 1 each by Misc.(Non-Drug; Combo Route) route Daily.    levoFLOXacin (LEVAQUIN) 750 MG tablet Take 1 tablet (750 mg total) by mouth once daily.    lisinopriL (PRINIVIL,ZESTRIL) 40 MG tablet Take 1 tablet (40 mg total) by mouth once daily.    metFORMIN (GLUCOPHAGE) 1000 MG tablet Take 1 tablet (1,000 mg total) by mouth 2 (two) times daily with meals.     "multivit-min/folic/vit K/lycop (MEN'S MULTIVITAMIN ORAL) Take 1 tablet by mouth once daily.    multivitamin (MULTIPLE VITAMIN ORAL) Take 1 capsule by mouth.    oxyCODONE-acetaminophen (PERCOCET) 7.5-325 mg per tablet Take 1 tablet by mouth every 6 (six) hours as needed for Pain. (Patient not taking: Reported on 12/9/2024)    pen needle, diabetic 31 gauge x 3/16" Ndle Inject 1 each into the skin 4 (four) times daily.    semaglutide (OZEMPIC) 0.25 mg or 0.5 mg (2 mg/3 mL) pen injector Inject 0.5 mg into the skin every 7 days.    semaglutide (OZEMPIC) 0.25 mg or 0.5 mg (2 mg/3 mL) pen injector Inject 0.5 mg into the skin.    subcutaneous insulin pump (MINIMED 780G INSULIN PUMP) Misc 1 each by Misc.(Non-Drug; Combo Route) route once daily.     Family History       Problem Relation (Age of Onset)    Diabetes Mother, Father, Maternal Grandmother, Maternal Grandfather, Paternal Grandmother, Paternal Grandfather    Heart disease Mother, Brother          Tobacco Use    Smoking status: Never     Passive exposure: Never    Smokeless tobacco: Never   Substance and Sexual Activity    Alcohol use: Yes     Comment: rare x1 beer-     Drug use: No    Sexual activity: Not Currently     Review of Systems   Constitutional:  Positive for activity change, appetite change, chills and fatigue. Negative for fever.   HENT:  Negative for trouble swallowing.    Eyes:  Negative for photophobia and visual disturbance.   Respiratory:  Negative for chest tightness, shortness of breath and wheezing.    Cardiovascular:  Negative for chest pain, palpitations and leg swelling.   Gastrointestinal:  Negative for abdominal pain, constipation, diarrhea, nausea and vomiting.   Genitourinary:  Negative for dysuria, frequency, hematuria and urgency.   Musculoskeletal:  Negative for arthralgias, back pain and gait problem.   Skin:  Positive for wound. Negative for color change and rash.   Neurological:  Positive for weakness. Negative for dizziness, syncope, " light-headedness, numbness and headaches.   Psychiatric/Behavioral:  Negative for agitation and confusion. The patient is not nervous/anxious.      Objective:     Vital Signs (Most Recent):  Temp: 99.4 °F (37.4 °C) (12/12/24 2054)  Pulse: 85 (12/12/24 2054)  Resp: 16 (12/12/24 2054)  BP: (!) 162/74 (12/12/24 2054)  SpO2: 95 % (12/12/24 2054) Vital Signs (24h Range):  Temp:  [99.3 °F (37.4 °C)-100.3 °F (37.9 °C)] 99.4 °F (37.4 °C)  Pulse:  [83-95] 85  Resp:  [16] 16  SpO2:  [94 %-95 %] 95 %  BP: (121-162)/(63-78) 162/74     Weight: 127 kg (280 lb)  Body mass index is 35.95 kg/m².     Physical Exam  Vitals and nursing note reviewed.   Constitutional:       General: He is not in acute distress.     Appearance: He is well-developed. He is obese.   HENT:      Head: Normocephalic and atraumatic.      Mouth/Throat:      Pharynx: No oropharyngeal exudate.   Eyes:      General: No scleral icterus.     Conjunctiva/sclera: Conjunctivae normal.   Cardiovascular:      Rate and Rhythm: Normal rate and regular rhythm.      Heart sounds: Normal heart sounds.   Pulmonary:      Effort: Pulmonary effort is normal. No respiratory distress.      Breath sounds: Normal breath sounds. No wheezing.   Abdominal:      General: Bowel sounds are normal. There is no distension.      Palpations: Abdomen is soft.      Tenderness: There is no abdominal tenderness.   Musculoskeletal:         General: No tenderness. Normal range of motion.      Cervical back: Normal range of motion and neck supple.   Lymphadenopathy:      Cervical: No cervical adenopathy.   Skin:     General: Skin is warm and dry.      Capillary Refill: Capillary refill takes less than 2 seconds.      Findings: Erythema (BLEs) present. No rash.      Comments: Wound with purulent drainage noted to R foot, see media   Neurological:      Mental Status: He is alert and oriented to person, place, and time.      Cranial Nerves: No cranial nerve deficit.      Sensory: No sensory deficit.       Coordination: Coordination normal.   Psychiatric:         Behavior: Behavior normal.         Thought Content: Thought content normal.         Judgment: Judgment normal.                      Significant Labs: All pertinent labs within the past 24 hours have been reviewed.  CBC:   Recent Labs   Lab 12/12/24  1609   WBC 9.54   HGB 13.7*   HCT 42.2        CMP:   Recent Labs   Lab 12/12/24  1609   *   K 4.9   CL 96   CO2 22*   *   BUN 19   CREATININE 1.4   CALCIUM 8.6*   PROT 7.5   ALBUMIN 2.6*   BILITOT 0.7   ALKPHOS 90   AST 23   ALT 21   ANIONGAP 12       Significant Imaging: I have reviewed all pertinent imaging results/findings within the past 24 hours.  Assessment/Plan:     * Right foot infection  - afebrile without leukocytosis  - inflammatory markers elevated  - XR, MRI pending  - continue IV vanc and cipro  - podiatry and ID consulted  - NPO midnight  - follow blood, wound cx    Obesity  Body mass index is 35.95 kg/m². Morbid obesity complicates all aspects of disease management from diagnostic modalities to treatment.    Hx of colon cancer, stage I  - noted hx    Type 2 diabetes mellitus 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, with long-term current use of insulin  - home regimen: insulin 70/30 40U in AM, 30U in PM  - IP regimen: lantus 15U BID + aspart 6U TIDWM  - LARRY MARSHALL accuchecks  Lab Results   Component Value Date    HGBA1C 10.2 (H) 11/27/2024       Essential hypertension  - hold home BP meds given orthostatic hypotension and infection, resume as appropriate       VTE Risk Mitigation (From admission, onward)           Ordered     enoxaparin injection 40 mg  Daily         12/12/24 2308     IP VTE HIGH RISK PATIENT  Once         12/12/24 2308                                    Idania Mendosa PA-C  Department of Hospital Medicine  [facility] - Emergency Dept

## 2024-12-13 NOTE — ED NOTES
Telemetry Verification   Patient placed on Telemetry Box  Verified with War Room  Box # [id number]   Monitor Tech .   Rate 84   Rhythm NSR

## 2024-12-13 NOTE — ASSESSMENT & PLAN NOTE
- overall poor control  - home regimen: insulin 70/30 40U in AM, 30U in PM    - Rx. Adjusted to 12/14  - some improvement this AM - will monitor and adjust further if needed.   - Lantus 30 units BID  - Aspart 10 units with meals.   - LDSSI, ACHS accuchecks  Lab Results   Component Value Date    HGBA1C 10.2 (H) 11/27/2024

## 2024-12-13 NOTE — PLAN OF CARE
[facility] - Med Surg  Initial Discharge Assessment       Primary Care Provider: Augie Ruiz MD    Admission Diagnosis: Cough [R05.9]  Syncope [R55]  Osteomyelitis [M86.9]  Soft tissue infection [L08.9]  Chest pain [R07.9]    Admission Date: 12/12/2024  Expected Discharge Date: 12/16/2024    Transition of Care Barriers: None    Payor: [employer] CROSS BLUE SHIELD / Plan: BCBS BLUE SAVER PPO - HD / Product Type: PPO /     Extended Emergency Contact Information  Primary Emergency Contact: Toña Rivera  Address: [address]  Home Phone: [phone]  Relation: Spouse    Discharge Plan A: Home with family  Discharge Plan B: Home      [facility]  [address] [facility] [address]  Phone: [phone] Fax: [phone]    [facility] [address] [place], [place] [id number] [address] [address]  Phone: [phone] Fax: [phone]      Sw met with patient at bedside for discharge assessment. Patient reported that he lives in the home with his wife Toña Rivera [phone]. Pt stated that he has 3 steps at the entrance and no steps inside the home. Patient is ambulatory and independent with ADL's. Patient does not use any medical equipment. Toña Yatesn will provide transportation home once patient is medically ready for discharge.    Discharge Plan A and Plan B have been determined by review of patient's clinical status, future medical and therapeutic needs, and coverage/benefits for post-acute care in coordination with multidisciplinary team members.         Initial Assessment (most recent)       Adult Discharge Assessment - 12/13/24 1532          Discharge Assessment    Assessment Type Discharge Planning Assessment     Confirmed/corrected address, phone number and insurance Yes     Confirmed Demographics Correct on Facesheet     Source of Information patient     When was your last doctors appointment? --   Patient  reported end of November or early December    Does patient/caregiver understand observation status Yes     Communicated DARVIN with patient/caregiver Date not available/Unable to determine     Reason For Admission Diabetic foot ulcer associated with diabetes mellitus due to underlying condition     People in Home spouse     Facility Arrived From: Home     Do you expect to return to your current living situation? Yes     Do you have help at home or someone to help you manage your care at home? Yes     Who are your caregiver(s) and their phone number(s)? Toña Rivera [phone] (Spouse/Support)     Prior to hospitilization cognitive status: Alert/Oriented     Current cognitive status: Alert/Oriented     Walking or Climbing Stairs Difficulty no     Dressing/Bathing Difficulty no     Home Layout Able to live on 1st floor     Equipment Currently Used at Home none     Readmission within 30 days? No     Patient currently being followed by outpatient case management? No     Do you currently have service(s) that help you manage your care at home? No     Do you take prescription medications? Yes     Do you have prescription coverage? Yes     Coverage BLUE CROSS BLUE SHIELD - BCBS BLUE SAVER PPO - HD (P)      Do you have any problems affording any of your prescribed medications? No (P)      Is the patient taking medications as prescribed? yes     Who is going to help you get home at discharge? Toña Rivera [phone] (Spouse/Support)     How do you get to doctors appointments? car, drives self     Are you on dialysis? No     Do you take coumadin? No     Discharge Plan A Home with family     Discharge Plan B Home     DME Needed Upon Discharge  none     Discharge Plan discussed with: Patient     Transition of Care Barriers None        Physical Activity    On average, how many days per week do you engage in moderate to strenuous exercise (like a brisk walk)? 0 days     On average, how many minutes do you engage in exercise at  this level? 0 min        Financial Resource Strain    How hard is it for you to pay for the very basics like food, housing, medical care, and heating? Not hard at all        Housing Stability    In the last 12 months, was there a time when you were not able to pay the mortgage or rent on time? No     At any time in the past 12 months, were you homeless or living in a shelter (including now)? No        Transportation Needs    Has the lack of transportation kept you from medical appointments, meetings, work or from getting things needed for daily living? No        Food Insecurity    Within the past 12 months, you worried that your food would run out before you got the money to buy more. Never true     Within the past 12 months, the food you bought just didn't last and you didn't have money to get more. Never true        Stress    Do you feel stress - tense, restless, nervous, or anxious, or unable to sleep at night because your mind is troubled all the time - these days? Only a little        Social Isolation    How often do you feel lonely or isolated from those around you?  Never        Alcohol Use    Q1: How often do you have a drink containing alcohol? Monthly or less     Q2: How many drinks containing alcohol do you have on a typical day when you are drinking? 1 or 2     Q3: How often do you have six or more drinks on one occasion? Never        [employer]    In the past 12 months has the electric, gas, oil, or water company threatened to shut off services in your home? No        Health Literacy    How often do you need to have someone help you when you read instructions, pamphlets, or other written material from your doctor or pharmacy? Never        OTHER    Name(s) of People in Home Toña Rivera [phone] (Spouse/Support)                          MARTIR Cuevas  Case Management  [phone]

## 2024-12-13 NOTE — PROGRESS NOTES
Hospital Medicine  Progress note    Team: [facility] W Perico Florentino MD  Admit Date: 12/12/2024    Principal Problem:  Diabetic foot ulcer associated with diabetes mellitus due to underlying condition    HPI:Billy Rivera is a 59 y.o. male with a PMHx of T2DM, CAD, HTN, HLD, and right foot wound who presents to [facility] for evaluation of left foot infection. Patient had a chronic right foot wound for which he follows with podiatry. He had surgery intervention on his R metatarsal on 12/5. He was prescribed PO clinda and cipro prophylactially following the surgery. He reports felling generally unwell with chills, decreased appetite,generalized weakness since Tueaday. Reports falling earlier today due to generalized weakness, but was able to catch himself with his arms. No head trauma, neck/back pain or LOC. His R foot has been wrapped since the surgery so he hasn't look at his surgical incision until bandage was taken off in the ED. He has purulent drainage from incision site. Denies any LE wound, chest pain, SOB, N/V, abdominal pain or syncope.       Interval hx:    No new sx. Today. Underwent debridement and bone Bx. Per Dermatology 12/13.   Hyperglycemia noted, Rx. Adjusted.     Physical Exam:    Vital Signs (Most Recent)  Temp: 98.4 °F (36.9 °C) (12/13/24 1132)  Pulse: 87 (12/13/24 1132)  Resp: 18 (12/13/24 1132)  BP: (!) 154/79 (12/13/24 1132)  SpO2: (!) 94 % (12/13/24 1132)    Intake/Output Summary (Last 24 hours) at 12/13/2024 1310  Last data filed at 12/13/2024 0116  Gross per 24 hour   Intake 200 ml   Output --   Net 200 ml     Physical Exam  Constitutional:       General: He is not in acute distress.     Appearance: He is obese.   HENT:      Mouth/Throat:      Mouth: Mucous membranes are moist.   Cardiovascular:      Rate and Rhythm: Normal rate and regular rhythm.      Heart sounds: No murmur heard.  Pulmonary:      Effort: Pulmonary effort is normal. No respiratory distress.   Abdominal:       General: Abdomen is flat. There is no distension.      Tenderness: There is no abdominal tenderness.   Musculoskeletal:      Comments: Right food in bandage, clean and dry. Erythema noted lower 1/2 of right lower leg - poss. Cellulitis.   No edema.    Neurological:      General: No focal deficit present.      Mental Status: He is alert.   Psychiatric:         Mood and Affect: Mood normal.             Recent Labs   Lab 12/12/24  1609 12/13/24  0603   WBC 9.54 7.00   HGB 13.7* 13.4*   HCT 42.2 39.0*    253     Recent Labs   Lab 12/12/24  1609 12/13/24  0603   * 130*   K 4.9 4.2   CL 96 97   CO2 22* 22*   BUN 19 19   CREATININE 1.4 1.2   * 292*   CALCIUM 8.6* 8.2*   MG  --  1.5*   PHOS  --  2.6*     Recent Labs   Lab 12/12/24  1609   ALKPHOS 90   ALT 21   AST 23   ALBUMIN 2.6*   PROT 7.5   BILITOT 0.7        Recent Labs   Lab 12/13/24  0213 12/13/24  0841 12/13/24  1221   POCTGLUCOSE 349* 334* 315*       Scheduled Meds:   atorvastatin  80 mg Oral QHS    ciprofloxacin  400 mg Intravenous Q12H    enoxparin  40 mg Subcutaneous Daily    insulin aspart U-100  8 Units Subcutaneous TIDWM    insulin glargine U-100  20 Units Subcutaneous BID    vancomycin (VANCOCIN) IV (PEDS and ADULTS)  1,500 mg Intravenous Q12H     Continuous Infusions:   lactated ringers   Intravenous Continuous 125 mL/hr at 12/13/24 1053 New Bag at 12/13/24 1053     As Needed:    Current Facility-Administered Medications:     acetaminophen, 650 mg, Oral, Q4H PRN    albuterol-ipratropium, 3 mL, Nebulization, Q4H PRN    bisacodyL, 10 mg, Rectal, Daily PRN    dextrose 10%, 12.5 g, Intravenous, PRN    dextrose 10%, 25 g, Intravenous, PRN    diphenhydrAMINE, 25 mg, Intravenous, Q12H PRN    glucagon (human recombinant), 1 mg, Intramuscular, PRN    glucose, 16 g, Oral, PRN    glucose, 24 g, Oral, PRN    insulin aspart U-100, 0-5 Units, Subcutaneous, QID (AC + HS) PRN    melatonin, 6 mg, Oral, Nightly PRN    polyethylene glycol, 17 g, Oral,  Daily PRN    Pharmacy to dose Vancomycin consult, , , Once **AND** vancomycin - pharmacy to dose, , Intravenous, pharmacy to manage frequency    Assessment and Plan  / Problems managed today    * Diabetic foot ulcer associated with diabetes mellitus due to underlying condition  - afebrile without leukocytosis  - inflammatory markers elevated  - XR, MRI confirms osteomyelitis of metatarsal 2 and 3  - continue IV vanc and cipro  - 12/13 - podiatry debrided and took bone Bx. Results pending  - ID consulted.     Subacute osteomyelitis of right foot  See Diabetic food infection      Type 2 diabetes mellitus 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, with long-term current use of insulin  - overall poor control  - home regimen: insulin 70/30 40U in AM, 30U in PM    - will change Rx. To basal bolus regiment  - Lantus 25 units BID  - Aspart 7 units with meals.   - LDSSI, ACHS accuchecks  Lab Results   Component Value Date    HGBA1C 10.2 (H) 11/27/2024       JOVANNI (acute kidney injury)  JOVANNI is likely due to pre-renal azotemia due to intravascular volume depletion. Baseline creatinine is  1.2 . Most recent creatinine and eGFR are listed below.  Recent Labs     12/12/24  1609 12/13/24  0603   CREATININE 1.4 1.2   EGFRNORACEVR 57.5* >60.0      Plan  - JOVANNI is improving  - Avoid nephrotoxins and renally dose meds for GFR listed above  - Monitor urine output, serial BMP, and adjust therapy as needed  - holding ACE    Obesity  Body mass index is 35.95 kg/m². Morbid obesity complicates all aspects of disease management from diagnostic modalities to treatment.    Hx of colon cancer, stage I  - noted hx    Hyponatremia  Hyponatremia is likely due to Dehydration/hypovolemia and Hyperglycemia - corrected ca. 133. The patient's most recent sodium results are listed below.  Recent Labs     12/12/24  1609 12/13/24  0603   * 130*     Plan  - Correct the sodium by 4-6mEq in 24 hours.   -  Obtain the following studies: serial labs.  - Will treat the hyponatremia with IV fluids as follows: 100 cc/hr  - Monitor sodium Daily.   - Patient hyponatremia is improving    Essential hypertension  - on Amlodipine 10, Carvediolol 12.5 BID and Lisinopril 40 mg daily  - will resume as feasible, currently holding ACE due to JOVANNI.         Discharge Planning   DARVIN: 12/16/2024     Code Status: Full Code   Is the patient medically ready for discharge?:     Reason for patient still in hospital (select all that apply): Patient trending condition             Perico Florentino MD  Sr. Staff Physician  [facility]

## 2024-12-13 NOTE — ASSESSMENT & PLAN NOTE
Hyponatremia is likely due to Dehydration/hypovolemia and Hyperglycemia - corrected ca. 133. The patient's most recent sodium results are listed below.  Recent Labs     12/13/24  0603 12/14/24  0405 12/15/24  0329   * 133* 133*     Plan  - Correct the sodium by 4-6mEq in 24 hours.   - Obtain the following studies: serial labs.  - Will treat the hyponatremia with IV fluids as follows: 100 cc/hr  - Monitor sodium Daily.   - Patient hyponatremia is improving

## 2024-12-13 NOTE — ASSESSMENT & PLAN NOTE
- afebrile without leukocytosis  - inflammatory markers elevated  - XR, MRI pending  - continue IV vanc and cipro  - podiatry and ID consulted  - NPO midnight  - follow blood, wound cx

## 2024-12-13 NOTE — SUBJECTIVE & OBJECTIVE
Past Medical History:   Diagnosis Date    Allergy     CAD (coronary artery disease), native coronary artery 6/25/2013    Cancer     COVID-19 virus detected 9/1/2020    Diabetes mellitus     Diabetes mellitus, type 2     Disorder of kidney and ureter     Heart attack 04/2012    Hx of colon cancer, stage I     Hyperlipidemia     Hypertension     Muscular pain     post-op after colonoscopy    NSTEMI May 2013 - peak troponin 0.22 5/22/2013    MICHAELA (obstructive sleep apnea)     Retinopathy due to secondary diabetes        Past Surgical History:   Procedure Laterality Date    COLONOSCOPY N/A 2/17/2017    Procedure: COLONOSCOPY;  Surgeon: Simon Gomez MD;  Location: [facility] ENDO (4TH FLR);  Service: Endoscopy;  Laterality: N/A;    CORONARY ANGIOPLASTY      EXCISION, TARSAL OR METATARSAL BONE, EXCLUDING TALUS OR CALCANEUS, PARTIAL Right 12/6/2024    Procedure: EXCISION,TARSAL OR METATARSAL BONE,EXCLUDING TALUS OR CALCANEUS,PARTIAL;  Surgeon: Mingo Melara DPM;  Location: [facility];  Service: Podiatry;  Laterality: Right;    INCISION AND DRAINAGE FOOT Right 6/5/2018    Procedure: INCISION AND DRAINAGE, FOOT;  Surgeon: Bridget Santana DPM;  Location: [facility] 1ST FLR;  Service: Podiatry;  Laterality: Right;  Request mini C arm in room. request wound vac with medium black sponge    INCISION AND DRAINAGE FOOT Right 6/7/2018    Procedure: INCISION AND DRAINAGE, FOOT;  Surgeon: Emelia Brock DPM;  Location: [facility] OR 2ND FLR;  Service: Podiatry;  Laterality: Right;    INCISION AND DRAINAGE FOOT Left 9/4/2020    Procedure: INCISION AND DRAINAGE, FOOT;  Surgeon: Ainsley Powell DPM;  Location: [facility] OR 2ND FLR;  Service: Podiatry;  Laterality: Left;    INCISION AND DRAINAGE FOOT Left 12/22/2020    Procedure: INCISION AND DRAINAGE, FOOT;  Surgeon: Ainsley Powell DPM;  Location: [facility] OR 2ND FLR;  Service: Podiatry;  Laterality: Left;  May need micro choice  Need Jam shidi needles  Stretcher OK      INCISION AND DRAINAGE OF ABSCESS  Right 6/2/2018    Procedure: INCISION AND DRAINAGE, ABSCESS;  Surgeon: Bridget Santana DPM;  Location: [facility] OR 2ND FLR;  Service: General;  Laterality: Right;    OSTEOTOMY OF METATARSAL BONE  9/4/2020    Procedure: OSTEOTOMY, METATARSAL BONE, 1st METATARSAL RESECTION;  Surgeon: Ainsley Powell DPM;  Location: [facility] OR 2ND FLR;  Service: Podiatry;;    REMOVAL OF FOREIGN BODY FROM FOOT Right 6/7/2018    Procedure: REMOVAL, FOREIGN BODY, FOOT;  Surgeon: Emelia Brock DPM;  Location: [facility] OR 2ND FLR;  Service: Podiatry;  Laterality: Right;    TOE AMPUTATION Left 7/4/2020    Procedure: AMPUTATION, TOE;  Surgeon: Bridget Santana DPM;  Location: [facility] OR 2ND FLR;  Service: Podiatry;  Laterality: Left;    TOE AMPUTATION Left 10/14/2020    Procedure: AMPUTATION, TOE;  Surgeon: Mingo Melara DPM;  Location: [facility] OR 2ND FLR;  Service: Podiatry;  Laterality: Left;  stretcher OK    TOE AMPUTATION Right 6/9/2022    Procedure: AMPUTATION, TOE - Dr. Melara @ [place] in am;  Surgeon: Mingo Melara DPM;  Location: [facility] OR 1ST FLR;  Service: Podiatry;  Laterality: Right;    TOE AMPUTATION Right 11/23/2022    Procedure: AMPUTATION, TOE;  Surgeon: Hansa Robertson DPM;  Location: [facility] OR 2ND FLR;  Service: Podiatry;  Laterality: Right;    TOE AMPUTATION Right 11/25/2022    Procedure: AMPUTATION, TOE;  Surgeon: Chris Groves DPM;  Location: [facility] OR 2ND FLR;  Service: Podiatry;  Laterality: Right;    TONSILLECTOMY         Review of patient's allergies indicates:   Allergen Reactions    Penicillins Other (See Comments)     PCN allergy as a child - was told he went into a coma. Tolerates Cefazolin without adverse reactions    Shellfish containing products      Other reaction(s): Unknown    Vancomycin Itching     Tolerated vancomycin 7/2020    Bactrim  [sulfamethoxazole-trimethoprim] Rash       Medications:  Medications Prior to Admission   Medication Sig    acetaminophen (TYLENOL) 500 MG tablet Take 1,000 mg by mouth  daily as needed for Pain.    amLODIPine (NORVASC) 10 MG tablet Take 1 tablet (10 mg total) by mouth once daily.    aspirin (ECOTRIN) 81 MG EC tablet Take 1 tablet (81 mg total) by mouth once daily.    atorvastatin (LIPITOR) 80 MG tablet Take 1 tablet (80 mg total) by mouth every evening.    atorvastatin (LIPITOR) 80 MG tablet Take 80 mg by mouth.    bismuth subsalicylate (PEPTO BISMOL) 262 mg/15 mL suspension Take 15 mLs by mouth daily as needed (diarrhea).    blood sugar diagnostic (ACCU-CHEK GUIDE TEST STRIPS) Strp 1 each by Misc.(Non-Drug; Combo Route) route 5 (five) times daily.    blood-glucose sensor Haley Inject 1 each into the skin every 7 days.    carvediloL (COREG) 12.5 MG tablet Take 0.5 tablets (6.25 mg total) by mouth 2 (two) times daily with meals.    carvediloL (COREG) 12.5 MG tablet Take 12.5 mg by mouth.    ciprofloxacin HCl (CIPRO) 500 MG tablet Take 1 tablet (500 mg total) by mouth every 12 (twelve) hours.    clindamycin (CLEOCIN) 300 MG capsule Take 1 capsule (300 mg total) by mouth every 6 (six) hours.    doxycycline (VIBRAMYCIN) 100 MG Cap Take 1 capsule (100 mg total) by mouth 2 (two) times daily.    insulin aspart, niacinamide, (FIASP U-100 INSULIN) 100 unit/mL Soln Inject 100 Units into the skin continuous. Gives via insulin pump up to 100 units daily. Do not directly inject. Uses as directed. Rate is 1.75 units/hour - carb counts as well for meal time boluses.    insulin lispro 100 unit/mL pen Inject into the skin.    insulin NPH/Reg human (NOVOLIN 70-30 FLEXPEN U-100) 100 unit/mL (70-30) InPn pen Inject 36 units before breakfast and 30 units before dinner    insulin regular 100 unit/mL Inj injection Inject 100 Units into the skin once daily. Inject up to 100u via insulin pump daily    lancets (ACCU-CHEK FASTCLIX LANCET DRUM) Misc 1 each by Misc.(Non-Drug; Combo Route) route Daily.    levoFLOXacin (LEVAQUIN) 750 MG tablet Take 1 tablet (750 mg total) by mouth once daily.    lisinopriL  "(PRINIVIL,ZESTRIL) 40 MG tablet Take 1 tablet (40 mg total) by mouth once daily.    metFORMIN (GLUCOPHAGE) 1000 MG tablet Take 1 tablet (1,000 mg total) by mouth 2 (two) times daily with meals.    multivit-min/folic/vit K/lycop (MEN'S MULTIVITAMIN ORAL) Take 1 tablet by mouth once daily.    multivitamin (MULTIPLE VITAMIN ORAL) Take 1 capsule by mouth.    oxyCODONE-acetaminophen (PERCOCET) 7.5-325 mg per tablet Take 1 tablet by mouth every 6 (six) hours as needed for Pain. (Patient not taking: Reported on 12/9/2024)    pen needle, diabetic 31 gauge x 3/16" Ndle Inject 1 each into the skin 4 (four) times daily.    semaglutide (OZEMPIC) 0.25 mg or 0.5 mg (2 mg/3 mL) pen injector Inject 0.5 mg into the skin every 7 days.    semaglutide (OZEMPIC) 0.25 mg or 0.5 mg (2 mg/3 mL) pen injector Inject 0.5 mg into the skin.    subcutaneous insulin pump (MINIMED 780G INSULIN PUMP) Misc 1 each by Misc.(Non-Drug; Combo Route) route once daily.     Antibiotics (From admission, onward)      Start     Stop Route Frequency Ordered    12/13/24 1100  ciprofloxacin (CIPRO)400mg/200ml D5W IVPB 400 mg         -- IV Every 12 hours (non-standard times) 12/13/24 0130    12/13/24 0900  vancomycin 1,500 mg in 0.9% NaCl 250 mL IVPB (admixture device)         -- IV Every 12 hours (non-standard times) 12/13/24 0146    12/13/24 0229  vancomycin - pharmacy to dose  (vancomycin IVPB (PEDS and ADULTS))        Placed in "And" Linked Group    -- IV pharmacy to manage frequency 12/13/24 0130          Antifungals (From admission, onward)      None          Antivirals (From admission, onward)      None             Immunization History   Administered Date(s) Administered    COVID-19, MRNA, LN-S, PF (MODERNA FULL 0.5 ML DOSE) 03/30/2021, 04/28/2021    Influenza 01/18/2019    Influenza - Intradermal - Quadrivalent - PF 10/24/2013    Influenza - Quadrivalent - PF *Preferred* (6 months and older) 11/08/2010, 09/28/2020, 12/07/2022    Pneumococcal Conjugate - 13 " Valent 09/28/2020    Pneumococcal Polysaccharide - 23 Valent 06/29/2017    Tdap 09/08/2015    Zoster Recombinant 09/28/2020       Family History       Problem Relation (Age of Onset)    Diabetes Mother, Father, Maternal Grandmother, Maternal Grandfather, Paternal Grandmother, Paternal Grandfather    Heart disease Mother, Brother          Social History     Socioeconomic History    Marital status:    Tobacco Use    Smoking status: Never     Passive exposure: Never    Smokeless tobacco: Never   Substance and Sexual Activity    Alcohol use: Yes     Comment: rare x1 beer-     Drug use: No    Sexual activity: Not Currently     Social Drivers of Health     Financial Resource Strain: Low Risk  (12/13/2024)    Overall Financial Resource Strain (CARDIA)     Difficulty of Paying Living Expenses: Not hard at all   Food Insecurity: No Food Insecurity (12/13/2024)    Hunger Vital Sign     Worried About Running Out of Food in the Last Year: Never true     Ran Out of Food in the Last Year: Never true   Transportation Needs: No Transportation Needs (12/13/2024)    TRANSPORTATION NEEDS     Transportation : No   Stress: No Stress Concern Present (12/13/2024)    [place] [employer] - Occupational Stress Questionnaire     Feeling of Stress : Not at all   Housing Stability: Low Risk  (12/13/2024)    Housing Stability Vital Sign     Unable to Pay for Housing in the Last Year: No     Homeless in the Last Year: No     Review of Systems   Constitutional:  Positive for appetite change and chills. Negative for fever.   HENT:  Positive for congestion. Negative for sore throat.    Respiratory:  Positive for cough. Negative for shortness of breath.    Cardiovascular:  Negative for chest pain.   Gastrointestinal:  Positive for nausea. Negative for constipation, diarrhea and vomiting.   Musculoskeletal:  Negative for myalgias.   Skin:  Positive for color change and wound (R foot).   Neurological:  Positive for weakness.      Objective:     Vital Signs (Most Recent):  Temp: 98.4 °F (36.9 °C) (12/13/24 1132)  Pulse: 87 (12/13/24 1132)  Resp: 18 (12/13/24 1132)  BP: (!) 154/79 (12/13/24 1132)  SpO2: (!) 94 % (12/13/24 1132) Vital Signs (24h Range):  Temp:  [97.6 °F (36.4 °C)-100.3 °F (37.9 °C)] 98.4 °F (36.9 °C)  Pulse:  [83-95] 87  Resp:  [16-18] 18  SpO2:  [94 %-95 %] 94 %  BP: (121-180)/() 154/79     Weight: 126.8 kg (279 lb 9.6 oz)  Body mass index is 35.9 kg/m².    Estimated Creatinine Clearance: 92.6 mL/min (based on SCr of 1.2 mg/dL).     Physical Exam  Vitals and nursing note reviewed.   Constitutional:       General: He is not in acute distress.     Appearance: He is not ill-appearing, toxic-appearing or diaphoretic.   HENT:      Head: Normocephalic and atraumatic.      Nose: Nose normal.      Mouth/Throat:      Mouth: Mucous membranes are moist.   Eyes:      Conjunctiva/sclera: Conjunctivae normal.   Cardiovascular:      Rate and Rhythm: Normal rate and regular rhythm.      Pulses: Normal pulses.      Heart sounds: No murmur heard.  Pulmonary:      Effort: Pulmonary effort is normal. No respiratory distress.   Abdominal:      Palpations: Abdomen is soft.      Tenderness: There is no abdominal tenderness.   Musculoskeletal:      Right lower leg: Edema present.      Left lower leg: Edema present.   Skin:     Findings: Erythema present.      Comments: R 2nd metatarsal head surgical site with purulence and surrounding erythema (see photos). Bandaged.   Neurological:      Mental Status: He is alert and oriented to person, place, and time.   Psychiatric:         Mood and Affect: Mood normal.          Significant Labs: Blood Culture:   Recent Labs   Lab 12/13/24  0100 12/13/24  0125   LABBLOO No Growth to date No Growth to date     CBC:   Recent Labs   Lab 12/12/24  1609 12/13/24  0603   WBC 9.54 7.00   HGB 13.7* 13.4*   HCT 42.2 39.0*    253     CMP:   Recent Labs   Lab 12/12/24  1609 12/13/24  0603   * 130*   K  4.9 4.2   CL 96 97   CO2 22* 22*   * 292*   BUN 19 19   CREATININE 1.4 1.2   CALCIUM 8.6* 8.2*   PROT 7.5  --    ALBUMIN 2.6*  --    BILITOT 0.7  --    ALKPHOS 90  --    AST 23  --    ALT 21  --    ANIONGAP 12 11     Microbiology Results (last 7 days)       Procedure Component Value Units Date/Time    Culture, Anaerobe [[id number]] Collected: 12/13/24 1012    Order Status: Sent Specimen: Bone from Foot, Right Updated: 12/13/24 1205    Gram stain [[id number]] Collected: 12/13/24 1012    Order Status: Sent Specimen: Bone from Foot, Right Updated: 12/13/24 1158    Aerobic culture [[id number]] Collected: 12/13/24 1012    Order Status: Sent Specimen: Bone from Foot, Right Updated: 12/13/24 1158    Blood culture [[id number]] Collected: 12/13/24 0125    Order Status: Completed Specimen: Blood from Peripheral, Hand, Right Updated: 12/13/24 1115     Blood Culture, Routine No Growth to date    Blood culture [[id number]] Collected: 12/13/24 0100    Order Status: Completed Specimen: Blood from Peripheral, Forearm, Left Updated: 12/13/24 1115     Blood Culture, Routine No Growth to date    Blood culture [[id number]]     Order Status: Canceled Specimen: Blood     Blood culture [[id number]]     Order Status: Canceled Specimen: Blood     Influenza A & B by Molecular [[id number]] Collected: 12/12/24 1612    Order Status: Completed Specimen: Nasopharyngeal Swab Updated: 12/12/24 1730     Influenza A, Molecular Negative     Influenza B, Molecular Negative     Flu A & B Source Nasal swab            Urine Studies:   Recent Labs   Lab 12/12/24  1650   COLORU Yellow   APPEARANCEUA Clear   PHUR 6.0   SPECGRAV 1.030   PROTEINUA 2+*   GLUCUA 4+*   KETONESU Trace*   BILIRUBINUA Negative   OCCULTUA Trace*   NITRITE Negative   LEUKOCYTESUR Negative   RBCUA 4   WBCUA 1   BACTERIA Occasional   SQUAMEPITHEL 0   HYALINECASTS 26*     Wound Culture:   Recent Labs   Lab 09/12/24  1239   LABAERO STAPHYLOCOCCUS AUREUS  Many  *  SERRATIA  MARCESCENS   Few  *  CITROBACTER FREUNDII  Rare  Skin josselin also present  *       Significant Imaging: I have reviewed all pertinent imaging results/findings within the past 24 hours.

## 2024-12-13 NOTE — ASSESSMENT & PLAN NOTE
I have reviewed hospital notes from   service and other specialty providers. I have also reviewed CBC, CMP/BMP,  cultures and imaging with my interpretation as documented.      59 year old male with insulin dependent diabetes, CAD, HLD, with history of prior L foot osteomyelitis s/p left foot hallux amputation, partial left 2nd toe amputation, and right 3rd and 5th toe amputation presented to ER with R foot infection after recent 2nd metatarsal head resection on 12/5/24 with podiatry. Sent home with PO Clinda and Cipro. Started to have weakness, chills, and nausea 5 days after surgery. Presented to ER 12/13 and found to have purulence from recent incision site. Recently treated for infection of R foot with prolonged course of Levo and Doxy (end of care in November)    On admit, afebrile, otherwise VSS. No leukocytosis. Elevated .2 and ESR 83. Blood cultures ordered 12/13. MRI R forefoot with cellulitis and high likelihood of osteomyelitis of 2nd metatarsal shaft and 3rd metatarsal shaft. Started on Vancomycin and Cipro in ER. Podiatry and ID consulted.    Recommendations / Plan:  Continue IV Vanc and Cipro, will adjust abx pending culture data  Follow up gram stain and cultures from R foot  Follow up podiatry plans  Follow up blood cultures    -- Discussed with ID staff and primary team   -- ID will continue to follow w/ further recs.

## 2024-12-13 NOTE — ASSESSMENT & PLAN NOTE
- on Amlodipine 10, Carvediolol 12.5 BID and Lisinopril 40 mg daily  - BP currently controlled  - will resume as feasible, currently holding ACE due to JOVANNI.

## 2024-12-13 NOTE — CONSULTS
[facility] - [facility] Surg  Infectious Disease  Consult Note    Patient Name: Billy Yatesn  MRN: [id number]  Admission Date: 12/12/2024  Hospital Length of Stay: 1 days  Attending Physician: Perico Florentino MD  Primary Care Provider: Augie Ruiz MD     Isolation Status: No active isolations    Patient information was obtained from patient, past medical records, and ER records.      Inpatient consult to Infectious Diseases  Consult performed by: Saadia Carlos PA-C  Consult ordered by: Idania Mendosa PA-C        Assessment/Plan:     Endocrine  * Diabetic foot ulcer associated with diabetes mellitus due to underlying condition  I have reviewed hospital notes from  HM service and other specialty providers. I have also reviewed CBC, CMP/BMP,  cultures and imaging with my interpretation as documented.      59 year old male with insulin dependent diabetes, CAD, HLD, with history of prior L foot osteomyelitis s/p left foot hallux amputation, partial left 2nd toe amputation, and right 3rd and 5th toe amputation presented to ER with R foot infection after recent 2nd metatarsal head resection on 12/5/24 with podiatry. Sent home with PO Clinda and Cipro. Started to have weakness, chills, and nausea 5 days after surgery. Presented to ER 12/13 and found to have purulence from recent incision site. Recently treated for infection of R foot with prolonged course of Levo and Doxy (end of care in November)    On admit, afebrile, otherwise VSS. No leukocytosis. Elevated .2 and ESR 83. Blood cultures ordered 12/13. MRI R forefoot with cellulitis and high likelihood of osteomyelitis of 2nd metatarsal shaft and 3rd metatarsal shaft. Started on Vancomycin and Cipro in ER. Podiatry and ID consulted.    Recommendations / Plan:  Continue IV Vanc and Cipro, will adjust abx pending culture data  Follow up gram stain and cultures from R foot  Follow up podiatry plans  Follow up blood cultures    -- Discussed with ID  staff and primary team   -- ID will continue to follow w/ further recs.        Thank you for your consult. I will follow-up with patient. Please contact us if you have any additional questions.    Saadia Carlos PA-C  Infectious Disease  [facility] - Med Surg    Subjective:     Principal Problem: Diabetic foot ulcer associated with diabetes mellitus due to underlying condition    HPI: 59 year old male with insulin dependent diabetes, CAD, HLD, with history of prior L foot osteomyelitis s/p left foot hallux amputation, partial left 2nd toe amputation, and right 3rd and 5th toe amputation presented to ER with R foot infection after recent amputation on 12/5/24 with podiatry.    In 9/2024 R foot with new issue ulceration/blister, podiatry was following weekly for debridement and wound care. Cultures 09/12/2024 growing Staph aureus, Serratia marcescens , Citrobacter freundii, and presumptive prevotella/porphyromonas group. Treated with 6 weeks of PO Doxy and Levo. Underwent 2nd metatarsal head resection of right foot on 12/5/24 with podiatry, Dr. Melara. Treated with PO Clindamycin and Cipro following surgery. Presented to ER 12/13 for 4 days of generalized weakness, chills, nausea, cough, decreased appetite, and nasal congestion. Reported fall on 12/10/24 with no head injury or LOC. No trauma to foot. His R foot has been wrapped since surgery, but was unwrapped in ER with redness and purulent drainage from incision site.    On admit, afebrile, otherwise VSS. No leukocytosis. Elevated .2 and ESR 83. Blood cultures 12/13 no growth. MRI R forefoot with cellulitis and high likelihood of osteomyelitis of 2nd metatarsal shaft and 3rd metatarsal shaft. Started on IV Vancomycin and IV Cipro in ER. Podiatry and ID consulted.     Patient reports his foot was not bothering him since surgery, it was mainly his other symptoms of cough, congestion, weakness, and chills. Notes upper respiratory symptoms are improving. No  fevers.     History of R foot with MSSA and pasturella in 01/2023, treated with 6 weeks of ceftriaxone. Bone culture of L 5th digit with MSSA (01/2024) treated with 6 weeks of Doxycycline, and he saw ID at that time. Hx of L foot culture (04/2024) growing Staph aureus, Acinetobacter baumannii/haemolyticus, Bacteroidies thetaiotaomicron, bacteroides stercoris, and presumptive prevotella/porphyromonas group. Hx of L foot culture (06/2024) growing Enterococcusus faecalis, staph aureus, and Prevotella bivia.     Past Medical History:   Diagnosis Date    Allergy     CAD (coronary artery disease), native coronary artery 6/25/2013    Cancer     COVID-19 virus detected 9/1/2020    Diabetes mellitus     Diabetes mellitus, type 2     Disorder of kidney and ureter     Heart attack 04/2012    Hx of colon cancer, stage I     Hyperlipidemia     Hypertension     Muscular pain     post-op after colonoscopy    NSTEMI May 2013 - peak troponin 0.22 5/22/2013    MICHAELA (obstructive sleep apnea)     Retinopathy due to secondary diabetes        Past Surgical History:   Procedure Laterality Date    COLONOSCOPY N/A 2/17/2017    Procedure: COLONOSCOPY;  Surgeon: Simon Gomez MD;  Location: [facility] (4TH FLR);  Service: Endoscopy;  Laterality: N/A;    CORONARY ANGIOPLASTY      EXCISION, TARSAL OR METATARSAL BONE, EXCLUDING TALUS OR CALCANEUS, PARTIAL Right 12/6/2024    Procedure: EXCISION,TARSAL OR METATARSAL BONE,EXCLUDING TALUS OR CALCANEUS,PARTIAL;  Surgeon: Mingo Melara DPM;  Location: [facility] OR;  Service: Podiatry;  Laterality: Right;    INCISION AND DRAINAGE FOOT Right 6/5/2018    Procedure: INCISION AND DRAINAGE, FOOT;  Surgeon: Bridget Santana DPM;  [address]  Service: Podiatry;  Laterality: Right;  Request mini C arm in room. request wound vac with medium black sponge    INCISION AND DRAINAGE FOOT Right 6/7/2018    Procedure: INCISION AND DRAINAGE, FOOT;  Surgeon: Emelia Brock DPM;  Location: [facility] OR 2ND FLR;   Service: Podiatry;  Laterality: Right;    INCISION AND DRAINAGE FOOT Left 9/4/2020    Procedure: INCISION AND DRAINAGE, FOOT;  Surgeon: Ainsley Powell DPM;  Location: [facility] OR 2ND FLR;  Service: Podiatry;  Laterality: Left;    INCISION AND DRAINAGE FOOT Left 12/22/2020    Procedure: INCISION AND DRAINAGE, FOOT;  Surgeon: Ainsley Powell DPM;  Location: [facility] OR 2ND FLR;  Service: Podiatry;  Laterality: Left;  May need micro choice  Need Jam shidi needles  Stretcher OK      INCISION AND DRAINAGE OF ABSCESS Right 6/2/2018    Procedure: INCISION AND DRAINAGE, ABSCESS;  Surgeon: Bridget Santana DPM;  Location: [facility] OR 2ND FLR;  Service: General;  Laterality: Right;    OSTEOTOMY OF METATARSAL BONE  9/4/2020    Procedure: OSTEOTOMY, METATARSAL BONE, 1st METATARSAL RESECTION;  Surgeon: Ainsley Powell DPM;  Location: [facility] OR 2ND FLR;  Service: Podiatry;;    REMOVAL OF FOREIGN BODY FROM FOOT Right 6/7/2018    Procedure: REMOVAL, FOREIGN BODY, FOOT;  Surgeon: Emelia Brock DPM;  Location: [facility] OR 2ND FLR;  Service: Podiatry;  Laterality: Right;    TOE AMPUTATION Left 7/4/2020    Procedure: AMPUTATION, TOE;  Surgeon: Bridget Santana DPM;  Location: [facility] OR 2ND FLR;  Service: Podiatry;  Laterality: Left;    TOE AMPUTATION Left 10/14/2020    Procedure: AMPUTATION, TOE;  Surgeon: Mingo Melara DPM;  Location: [facility] OR 2ND FLR;  Service: Podiatry;  Laterality: Left;  stretcher OK    TOE AMPUTATION Right 6/9/2022    Procedure: AMPUTATION, TOE - Dr. Melara @ [place] in am;  Surgeon: Mingo Melara DPM;  Location: [facility] OR 1ST FLR;  Service: Podiatry;  Laterality: Right;    TOE AMPUTATION Right 11/23/2022    Procedure: AMPUTATION, TOE;  Surgeon: Hansa Robertson DPM;  Location: [facility] OR 2ND FLR;  Service: Podiatry;  Laterality: Right;    TOE AMPUTATION Right 11/25/2022    Procedure: AMPUTATION, TOE;  Surgeon: Chris Groves DPM;  Location: [facility] OR 2ND FLR;  Service: Podiatry;  Laterality: Right;    TONSILLECTOMY          Review of patient's allergies indicates:   Allergen Reactions    Penicillins Other (See Comments)     PCN allergy as a child - was told he went into a coma. Tolerates Cefazolin without adverse reactions    Shellfish containing products      Other reaction(s): Unknown    Vancomycin Itching     Tolerated vancomycin 7/2020    Bactrim  [sulfamethoxazole-trimethoprim] Rash       Medications:  Medications Prior to Admission   Medication Sig    acetaminophen (TYLENOL) 500 MG tablet Take 1,000 mg by mouth daily as needed for Pain.    amLODIPine (NORVASC) 10 MG tablet Take 1 tablet (10 mg total) by mouth once daily.    aspirin (ECOTRIN) 81 MG EC tablet Take 1 tablet (81 mg total) by mouth once daily.    atorvastatin (LIPITOR) 80 MG tablet Take 1 tablet (80 mg total) by mouth every evening.    atorvastatin (LIPITOR) 80 MG tablet Take 80 mg by mouth.    bismuth subsalicylate (PEPTO BISMOL) 262 mg/15 mL suspension Take 15 mLs by mouth daily as needed (diarrhea).    blood sugar diagnostic (ACCU-CHEK GUIDE TEST STRIPS) Strp 1 each by Misc.(Non-Drug; Combo Route) route 5 (five) times daily.    blood-glucose sensor Haley Inject 1 each into the skin every 7 days.    carvediloL (COREG) 12.5 MG tablet Take 0.5 tablets (6.25 mg total) by mouth 2 (two) times daily with meals.    carvediloL (COREG) 12.5 MG tablet Take 12.5 mg by mouth.    ciprofloxacin HCl (CIPRO) 500 MG tablet Take 1 tablet (500 mg total) by mouth every 12 (twelve) hours.    clindamycin (CLEOCIN) 300 MG capsule Take 1 capsule (300 mg total) by mouth every 6 (six) hours.    doxycycline (VIBRAMYCIN) 100 MG Cap Take 1 capsule (100 mg total) by mouth 2 (two) times daily.    insulin aspart, niacinamide, (FIASP U-100 INSULIN) 100 unit/mL Soln Inject 100 Units into the skin continuous. Gives via insulin pump up to 100 units daily. Do not directly inject. Uses as directed. Rate is 1.75 units/hour - carb counts as well for meal time boluses.    insulin lispro 100 unit/mL  "pen Inject into the skin.    insulin NPH/Reg human (NOVOLIN 70-30 FLEXPEN U-100) 100 unit/mL (70-30) InPn pen Inject 36 units before breakfast and 30 units before dinner    insulin regular 100 unit/mL Inj injection Inject 100 Units into the skin once daily. Inject up to 100u via insulin pump daily    lancets (ACCU-CHEK FASTCLIX LANCET DRUM) Misc 1 each by Misc.(Non-Drug; Combo Route) route Daily.    levoFLOXacin (LEVAQUIN) 750 MG tablet Take 1 tablet (750 mg total) by mouth once daily.    lisinopriL (PRINIVIL,ZESTRIL) 40 MG tablet Take 1 tablet (40 mg total) by mouth once daily.    metFORMIN (GLUCOPHAGE) 1000 MG tablet Take 1 tablet (1,000 mg total) by mouth 2 (two) times daily with meals.    multivit-min/folic/vit K/lycop (MEN'S MULTIVITAMIN ORAL) Take 1 tablet by mouth once daily.    multivitamin (MULTIPLE VITAMIN ORAL) Take 1 capsule by mouth.    oxyCODONE-acetaminophen (PERCOCET) 7.5-325 mg per tablet Take 1 tablet by mouth every 6 (six) hours as needed for Pain. (Patient not taking: Reported on 12/9/2024)    pen needle, diabetic 31 gauge x 3/16" Ndle Inject 1 each into the skin 4 (four) times daily.    semaglutide (OZEMPIC) 0.25 mg or 0.5 mg (2 mg/3 mL) pen injector Inject 0.5 mg into the skin every 7 days.    semaglutide (OZEMPIC) 0.25 mg or 0.5 mg (2 mg/3 mL) pen injector Inject 0.5 mg into the skin.    subcutaneous insulin pump (MINIMED 780G INSULIN PUMP) Misc 1 each by Misc.(Non-Drug; Combo Route) route once daily.     Antibiotics (From admission, onward)      Start     Stop Route Frequency Ordered    12/13/24 1100  ciprofloxacin (CIPRO)400mg/200ml D5W IVPB 400 mg         -- IV Every 12 hours (non-standard times) 12/13/24 0130    12/13/24 0900  vancomycin 1,500 mg in 0.9% NaCl 250 mL IVPB (admixture device)         -- IV Every 12 hours (non-standard times) 12/13/24 0146    12/13/24 0229  vancomycin - pharmacy to dose  (vancomycin IVPB (PEDS and ADULTS))        Placed in "And" Linked Group    -- IV pharmacy " to manage frequency 12/13/24 0130          Antifungals (From admission, onward)      None          Antivirals (From admission, onward)      None             Immunization History   Administered Date(s) Administered    COVID-19, MRNA, LN-S, PF (MODERNA FULL 0.5 ML DOSE) 03/30/2021, 04/28/2021    Influenza 01/18/2019    Influenza - Intradermal - Quadrivalent - PF 10/24/2013    Influenza - Quadrivalent - PF *Preferred* (6 months and older) 11/08/2010, 09/28/2020, 12/07/2022    Pneumococcal Conjugate - 13 Valent 09/28/2020    Pneumococcal Polysaccharide - 23 Valent 06/29/2017    Tdap 09/08/2015    Zoster Recombinant 09/28/2020       Family History       Problem Relation (Age of Onset)    Diabetes Mother, Father, Maternal Grandmother, Maternal Grandfather, Paternal Grandmother, Paternal Grandfather    Heart disease Mother, Brother          Social History     Socioeconomic History    Marital status:    Tobacco Use    Smoking status: Never     Passive exposure: Never    Smokeless tobacco: Never   Substance and Sexual Activity    Alcohol use: Yes     Comment: rare x1 beer-     Drug use: No    Sexual activity: Not Currently     Social Drivers of Health     Financial Resource Strain: Low Risk  (12/13/2024)    Overall Financial Resource Strain (CARDIA)     Difficulty of Paying Living Expenses: Not hard at all   Food Insecurity: No Food Insecurity (12/13/2024)    Hunger Vital Sign     Worried About Running Out of Food in the Last Year: Never true     Ran Out of Food in the Last Year: Never true   Transportation Needs: No Transportation Needs (12/13/2024)    TRANSPORTATION NEEDS     Transportation : No   Stress: No Stress Concern Present (12/13/2024)    [place] [employer] - Occupational Stress Questionnaire     Feeling of Stress : Not at all   Housing Stability: Low Risk  (12/13/2024)    Housing Stability Vital Sign     Unable to Pay for Housing in the Last Year: No     Homeless in the Last Year: No      Review of Systems   Constitutional:  Positive for appetite change and chills. Negative for fever.   HENT:  Positive for congestion. Negative for sore throat.    Respiratory:  Positive for cough. Negative for shortness of breath.    Cardiovascular:  Negative for chest pain.   Gastrointestinal:  Positive for nausea. Negative for constipation, diarrhea and vomiting.   Musculoskeletal:  Negative for myalgias.   Skin:  Positive for color change and wound (R foot).   Neurological:  Positive for weakness.     Objective:     Vital Signs (Most Recent):  Temp: 98.4 °F (36.9 °C) (12/13/24 1132)  Pulse: 87 (12/13/24 1132)  Resp: 18 (12/13/24 1132)  BP: (!) 154/79 (12/13/24 1132)  SpO2: (!) 94 % (12/13/24 1132) Vital Signs (24h Range):  Temp:  [97.6 °F (36.4 °C)-100.3 °F (37.9 °C)] 98.4 °F (36.9 °C)  Pulse:  [83-95] 87  Resp:  [16-18] 18  SpO2:  [94 %-95 %] 94 %  BP: (121-180)/() 154/79     Weight: 126.8 kg (279 lb 9.6 oz)  Body mass index is 35.9 kg/m².    Estimated Creatinine Clearance: 92.6 mL/min (based on SCr of 1.2 mg/dL).     Physical Exam  Vitals and nursing note reviewed.   Constitutional:       General: He is not in acute distress.     Appearance: He is not ill-appearing, toxic-appearing or diaphoretic.   HENT:      Head: Normocephalic and atraumatic.      Nose: Nose normal.      Mouth/Throat:      Mouth: Mucous membranes are moist.   Eyes:      Conjunctiva/sclera: Conjunctivae normal.   Cardiovascular:      Rate and Rhythm: Normal rate and regular rhythm.      Pulses: Normal pulses.      Heart sounds: No murmur heard.  Pulmonary:      Effort: Pulmonary effort is normal. No respiratory distress.   Abdominal:      Palpations: Abdomen is soft.      Tenderness: There is no abdominal tenderness.   Musculoskeletal:      Right lower leg: Edema present.      Left lower leg: Edema present.   Skin:     Findings: Erythema present.      Comments: R 2nd metatarsal head surgical site with purulence and surrounding  erythema (see photos). Bandaged.   Neurological:      Mental Status: He is alert and oriented to person, place, and time.   Psychiatric:         Mood and Affect: Mood normal.          Significant Labs: Blood Culture:   Recent Labs   Lab 12/13/24  0100 12/13/24  0125   LABBLOO No Growth to date No Growth to date     CBC:   Recent Labs   Lab 12/12/24  1609 12/13/24  0603   WBC 9.54 7.00   HGB 13.7* 13.4*   HCT 42.2 39.0*    253     CMP:   Recent Labs   Lab 12/12/24  1609 12/13/24  0603   * 130*   K 4.9 4.2   CL 96 97   CO2 22* 22*   * 292*   BUN 19 19   CREATININE 1.4 1.2   CALCIUM 8.6* 8.2*   PROT 7.5  --    ALBUMIN 2.6*  --    BILITOT 0.7  --    ALKPHOS 90  --    AST 23  --    ALT 21  --    ANIONGAP 12 11     Microbiology Results (last 7 days)       Procedure Component Value Units Date/Time    Culture, Anaerobe [[id number]] Collected: 12/13/24 1012    Order Status: Sent Specimen: Bone from Foot, Right Updated: 12/13/24 1205    Gram stain [[id number]] Collected: 12/13/24 1012    Order Status: Sent Specimen: Bone from Foot, Right Updated: 12/13/24 1158    Aerobic culture [[id number]] Collected: 12/13/24 1012    Order Status: Sent Specimen: Bone from Foot, Right Updated: 12/13/24 1158    Blood culture [[id number]] Collected: 12/13/24 0125    Order Status: Completed Specimen: Blood from Peripheral, Hand, Right Updated: 12/13/24 1115     Blood Culture, Routine No Growth to date    Blood culture [[id number]] Collected: 12/13/24 0100    Order Status: Completed Specimen: Blood from Peripheral, Forearm, Left Updated: 12/13/24 1115     Blood Culture, Routine No Growth to date    Blood culture [[id number]]     Order Status: Canceled Specimen: Blood     Blood culture [[id number]]     Order Status: Canceled Specimen: Blood     Influenza A & B by Molecular [[id number]] Collected: 12/12/24 1612    Order Status: Completed Specimen: Nasopharyngeal Swab Updated: 12/12/24 2409     Influenza A, Molecular Negative      Influenza B, Molecular Negative     Flu A & B Source Nasal swab            Urine Studies:   Recent Labs   Lab 12/12/24  1650   COLORU Yellow   APPEARANCEUA Clear   PHUR 6.0   SPECGRAV 1.030   PROTEINUA 2+*   GLUCUA 4+*   KETONESU Trace*   BILIRUBINUA Negative   OCCULTUA Trace*   NITRITE Negative   LEUKOCYTESUR Negative   RBCUA 4   WBCUA 1   BACTERIA Occasional   SQUAMEPITHEL 0   HYALINECASTS 26*     Wound Culture:   Recent Labs   Lab 09/12/24  1239   LABAERO STAPHYLOCOCCUS AUREUS  Many  *  SERRATIA MARCESCENS   Few  *  CITROBACTER FREUNDII  Rare  Skin josselin also present  *       Significant Imaging: I have reviewed all pertinent imaging results/findings within the past 24 hours.

## 2024-12-13 NOTE — ASSESSMENT & PLAN NOTE
JOVANNI is likely due to pre-renal azotemia due to intravascular volume depletion. Baseline creatinine is  1.2 . Most recent creatinine and eGFR are listed below.  Recent Labs     12/13/24  0603 12/14/24  0405 12/15/24  0329   CREATININE 1.2 1.3 1.1   EGFRNORACEVR >60.0 >60.0 >60.0      Plan  - JOVANNI is improving  - Avoid nephrotoxins and renally dose meds for GFR listed above  - Monitor urine output, serial BMP, and adjust therapy as needed  - holding ACE  - improved

## 2024-12-13 NOTE — CONSULTS
Giuliano ariadna - [facility] Surg  Podiatry  Consult Note    Patient Name: Billy Sheffield Miguel  MRN: [id number]  Admission Date: 12/12/2024  Hospital Length of Stay: 1 days  Attending Physician: Perico Florentino MD  Primary Care Provider: Augie Ruiz MD     Inpatient consult to Podiatry  Consult performed by: Juanjose Luna MD  Consult ordered by: Billy Ruby MD        Subjective:     History of Present Illness:  59-year-old male with PMH CAD, DM2, HLD, HTN, prior pedal amputations presents to ED 12/12/24 for generalized weakness, chills, nausea.  Patient has had recent history with Dr. Mingo Melara DPM for right foot 2nd metatarsal head excision DOS 12/6/24.  On encounter, patient states that he had noticed weakness, chills, fevers, nonproductive cough days prior to presentation to the ED and was concerned that the source of infection may have been from his foot.  He had also suffered a recent fall secondary to weakness.  He was admitted to Medicine for IV antibiotics, anticipation for surgical debridement. Patient was initiated on broad-spectrum vancomycin/Cipro with ID consulted.     X-ray of right foot was obtained showing amputation changes of 2nd and 5th rays at the level of metatarsal shafts; no osseous infiltration of infection observed.  MRI of right foot obtained showing OM of the 3rd metatarsal shaft, periostitis versus early OM like changes to the 2nd metatarsal shaft.  Labs on encounter negative for leukocytosis per CBC draw; CMP showing hyponatremia 130, diminished bicarb 22, hypocalcemia 8.2, hypoalbuminemia 2.6.  Inflammatory markers elevated , sed rate 83.  Vital signs stable on encounter.    Scheduled Meds:   atorvastatin  80 mg Oral QHS    ciprofloxacin  400 mg Intravenous Q12H    enoxparin  40 mg Subcutaneous Daily    insulin aspart U-100  8 Units Subcutaneous TIDWM    insulin glargine U-100  20 Units Subcutaneous BID    vancomycin (VANCOCIN) IV (PEDS and ADULTS)  1,500 mg Intravenous Q12H      Continuous Infusions:   lactated ringers   Intravenous Continuous 125 mL/hr at 12/13/24 1053 New Bag at 12/13/24 1053     PRN Meds:  Current Facility-Administered Medications:     acetaminophen, 650 mg, Oral, Q4H PRN    albuterol-ipratropium, 3 mL, Nebulization, Q4H PRN    bisacodyL, 10 mg, Rectal, Daily PRN    dextrose 10%, 12.5 g, Intravenous, PRN    dextrose 10%, 25 g, Intravenous, PRN    diphenhydrAMINE, 25 mg, Intravenous, Q12H PRN    glucagon (human recombinant), 1 mg, Intramuscular, PRN    glucose, 16 g, Oral, PRN    glucose, 24 g, Oral, PRN    insulin aspart U-100, 0-5 Units, Subcutaneous, QID (AC + HS) PRN    melatonin, 6 mg, Oral, Nightly PRN    polyethylene glycol, 17 g, Oral, Daily PRN    Pharmacy to dose Vancomycin consult, , , Once **AND** vancomycin - pharmacy to dose, , Intravenous, pharmacy to manage frequency    Review of patient's allergies indicates:   Allergen Reactions    Penicillins Other (See Comments)     PCN allergy as a child - was told he went into a coma. Tolerates Cefazolin without adverse reactions    Shellfish containing products      Other reaction(s): Unknown    Vancomycin Itching     Tolerated vancomycin 7/2020    Bactrim  [sulfamethoxazole-trimethoprim] Rash        Past Medical History:   Diagnosis Date    Allergy     CAD (coronary artery disease), native coronary artery 6/25/2013    Cancer     COVID-19 virus detected 9/1/2020    Diabetes mellitus     Diabetes mellitus, type 2     Disorder of kidney and ureter     Heart attack 04/2012    Hx of colon cancer, stage I     Hyperlipidemia     Hypertension     Muscular pain     post-op after colonoscopy    NSTEMI May 2013 - peak troponin 0.22 5/22/2013    MICHAELA (obstructive sleep apnea)     Retinopathy due to secondary diabetes      Past Surgical History:   Procedure Laterality Date    COLONOSCOPY N/A 2/17/2017    Procedure: COLONOSCOPY;  Surgeon: Simon Gomez MD;  Location: [address];  Service: Endoscopy;  Laterality:  N/A;    CORONARY ANGIOPLASTY      EXCISION, TARSAL OR METATARSAL BONE, EXCLUDING TALUS OR CALCANEUS, PARTIAL Right 12/6/2024    Procedure: EXCISION,TARSAL OR METATARSAL BONE,EXCLUDING TALUS OR CALCANEUS,PARTIAL;  Surgeon: Mingo Melara DPM;  Location: [facility] OR;  Service: Podiatry;  Laterality: Right;    INCISION AND DRAINAGE FOOT Right 6/5/2018    Procedure: INCISION AND DRAINAGE, FOOT;  Surgeon: Bridget Santana DPM;  Location: [facility] OR 1ST FLR;  Service: Podiatry;  Laterality: Right;  Request mini C arm in room. request wound vac with medium black sponge    INCISION AND DRAINAGE FOOT Right 6/7/2018    Procedure: INCISION AND DRAINAGE, FOOT;  Surgeon: Emelia Brock DPM;  Location: [facility] OR 2ND FLR;  Service: Podiatry;  Laterality: Right;    INCISION AND DRAINAGE FOOT Left 9/4/2020    Procedure: INCISION AND DRAINAGE, FOOT;  Surgeon: Ainsley Powell DPM;  Location: [facility] OR 2ND FLR;  Service: Podiatry;  Laterality: Left;    INCISION AND DRAINAGE FOOT Left 12/22/2020    Procedure: INCISION AND DRAINAGE, FOOT;  Surgeon: Ainsley Powell DPM;  Location: [facility] OR 2ND FLR;  Service: Podiatry;  Laterality: Left;  May need micro choice  Need Jam shireena needles  Stretcher OK      INCISION AND DRAINAGE OF ABSCESS Right 6/2/2018    Procedure: INCISION AND DRAINAGE, ABSCESS;  Surgeon: Bridget Santana DPM;  Location: [facility] OR 2ND FLR;  Service: General;  Laterality: Right;    OSTEOTOMY OF METATARSAL BONE  9/4/2020    Procedure: OSTEOTOMY, METATARSAL BONE, 1st METATARSAL RESECTION;  Surgeon: Ainsley Powell DPM;  Location: [facility] OR 2ND FLR;  Service: Podiatry;;    REMOVAL OF FOREIGN BODY FROM FOOT Right 6/7/2018    Procedure: REMOVAL, FOREIGN BODY, FOOT;  Surgeon: Emelia Brock DPM;  Location: [facility] OR 2ND FLR;  Service: Podiatry;  Laterality: Right;    TOE AMPUTATION Left 7/4/2020    Procedure: AMPUTATION, TOE;  Surgeon: Bridget Santana DPM;  Location: [facility] OR 2ND FLR;  Service: Podiatry;  Laterality: Left;    TOE  AMPUTATION Left 10/14/2020    Procedure: AMPUTATION, TOE;  Surgeon: Mingo Melara DPM;  Location: [facility] OR 2ND FLR;  Service: Podiatry;  Laterality: Left;  stretcher OK    TOE AMPUTATION Right 6/9/2022    Procedure: AMPUTATION, TOE - Dr. Melara @ [place] in am;  Surgeon: Mingo Melara DPM;  Location: [facility] OR 1ST FLR;  Service: Podiatry;  Laterality: Right;    TOE AMPUTATION Right 11/23/2022    Procedure: AMPUTATION, TOE;  Surgeon: Hansa Robertson DPM;  Location: [facility] OR 2ND FLR;  Service: Podiatry;  Laterality: Right;    TOE AMPUTATION Right 11/25/2022    Procedure: AMPUTATION, TOE;  Surgeon: Chris Groves DPM;  Location: [facility] OR 2ND FLR;  Service: Podiatry;  Laterality: Right;    TONSILLECTOMY         Family History       Problem Relation (Age of Onset)    Diabetes Mother, Father, Maternal Grandmother, Maternal Grandfather, Paternal Grandmother, Paternal Grandfather    Heart disease Mother, Brother          Tobacco Use    Smoking status: Never     Passive exposure: Never    Smokeless tobacco: Never   Substance and Sexual Activity    Alcohol use: Yes     Comment: rare x1 beer-     Drug use: No    Sexual activity: Not Currently     Review of Systems   Constitutional:  Positive for activity change, appetite change, chills and fatigue. Negative for fever.   Respiratory:  Positive for cough. Negative for shortness of breath.    Cardiovascular:  Negative for leg swelling.   Gastrointestinal:  Negative for nausea and vomiting.   Musculoskeletal:  Negative for joint swelling.   Skin:  Positive for wound. Negative for color change.   Neurological:  Positive for weakness and numbness.     Objective:     Vital Signs (Most Recent):  Temp: 98.4 °F (36.9 °C) (12/13/24 1132)  Pulse: 87 (12/13/24 1132)  Resp: 18 (12/13/24 1132)  BP: (!) 154/79 (12/13/24 1132)  SpO2: (!) 94 % (12/13/24 1132) Vital Signs (24h Range):  Temp:  [97.6 °F (36.4 °C)-100.3 °F (37.9 °C)] 98.4 °F (36.9 °C)  Pulse:  [83-95] 87  Resp:  [16-18]  18  SpO2:  [94 %-95 %] 94 %  BP: (121-180)/() 154/79     Weight: 126.8 kg (279 lb 9.6 oz)  Body mass index is 35.9 kg/m².    Foot Exam    General  Orientation: alert and oriented to person, place, and time       Right Foot/Ankle     Inspection and Palpation  Tenderness: none   Swelling: (3rd toe)  Skin Exam: callus, drainage, dry skin, skin changes, abnormal color and ulcer; skin not intact, no cellulitis and no erythema     Neurovascular  Dorsalis pedis: 1+  Posterior tibial: doppler  Saphenous nerve sensation: absent  Tibial nerve sensation: absent  Superficial peroneal nerve sensation: absent  Deep peroneal nerve sensation: absent  Sural nerve sensation: absent    Comments  Partial 3rd and 5th ray amputated. Partial 2nd metatarsal amputated with digit left intact.   Incision site at dorsal 2nd metatarsal head noted to contain wound with fibrogranular base, purulence and slough present. Post debridement and suture removal, sanguinous residue was present. Measuring approximately 2.5 x 3.0 x 2.0 cm. Probing to bone. Non tender to palpation.  Negligible malodor.      Left Foot/Ankle      Inspection and Palpation  Tenderness: none   Swelling: none   Skin Exam: callus and dry skin; no drainage, no cellulitis, no ulcer and no erythema     Neurovascular  Dorsalis pedis: 1+  Posterior tibial: doppler  Saphenous nerve sensation: absent  Tibial nerve sensation: absent  Superficial peroneal nerve sensation: absent  Deep peroneal nerve sensation: absent  Sural nerve sensation: absent    Comments  Partial 1st ray amputated, stable and healed.  Scab located at dorsal 3rd digit, plantar right foot, as well as erythema on anterior leg; otherwise no other discernible wounds in left lower extremity      Laboratory:  CBC:   Recent Labs   Lab 12/13/24  0603   WBC 7.00   RBC 4.58*   HGB 13.4*   HCT 39.0*      MCV 85   MCH 29.3   MCHC 34.4     CMP:   Recent Labs   Lab 12/12/24  1609 12/13/24  0603   * 292*   CALCIUM  8.6* 8.2*   ALBUMIN 2.6*  --    PROT 7.5  --    * 130*   K 4.9 4.2   CO2 22* 22*   CL 96 97   BUN 19 19   CREATININE 1.4 1.2   ALKPHOS 90  --    ALT 21  --    AST 23  --    BILITOT 0.7  --      CRP:   Recent Labs   Lab 12/12/24 2112   .2*     ESR:   Recent Labs   Lab 12/12/24 2112   SEDRATE 83*     Microbiology Results (last 7 days)       Procedure Component Value Units Date/Time    Culture, Anaerobe [[id number]] Collected: 12/13/24 1012    Order Status: Sent Specimen: Bone from Foot, Right Updated: 12/13/24 1205    Gram stain [[id number]] Collected: 12/13/24 1012    Order Status: Sent Specimen: Bone from Foot, Right Updated: 12/13/24 1158    Aerobic culture [[id number]] Collected: 12/13/24 1012    Order Status: Sent Specimen: Bone from Foot, Right Updated: 12/13/24 1158    Blood culture [[id number]] Collected: 12/13/24 0125    Order Status: Completed Specimen: Blood from Peripheral, Hand, Right Updated: 12/13/24 1115     Blood Culture, Routine No Growth to date    Blood culture [[id number]] Collected: 12/13/24 0100    Order Status: Completed Specimen: Blood from Peripheral, Forearm, Left Updated: 12/13/24 1115     Blood Culture, Routine No Growth to date    Blood culture [[id number]]     Order Status: Canceled Specimen: Blood     Blood culture [[id number]]     Order Status: Canceled Specimen: Blood     Influenza A & B by Molecular [[id number]] Collected: 12/12/24 1612    Order Status: Completed Specimen: Nasopharyngeal Swab Updated: 12/12/24 1730     Influenza A, Molecular Negative     Influenza B, Molecular Negative     Flu A & B Source Nasal swab          All pertinent labs reviewed within the last 24 hours.    Diagnostic Results:  I have reviewed all pertinent imaging results/findings within the past 24 hours.    Clinical Findings: Right foot          Assessment/Plan:     ID  * Right foot infection  59-year-old male with PMH CAD, DM2, HLD, HTN, prior pedal amputations presents to ED 12/12/24 for  generalized weakness, chills, nausea.  Patient has had recent history with Dr. Mingo Melara DPM for right foot 2nd metatarsal head excision DOS 12/6/24. Podiatry consulted for management of aforementioned problem.     Assessment: Post-surgical infection of right foot incision in setting of DM. Per exam, probe to bone s/p debridement of ulcer. XR of right foot was obtained showing amputation changes of 2nd and 5th rays at the level of metatarsal shafts; no osseous infiltration of infection observed.  MRI of right foot obtained showing OM of the 3rd metatarsal shaft, periostitis versus early OM like changes to the 2nd metatarsal shaft.     Plan:  Imaging reviewed with patient  Bone biopsy obtained of right 3rd metatarsal, sent to micro and pathology. Debridement performed today with suture removal at bedside. Procedure note below  ID consulted, appreciate recommendations.   Antibiotics per ID   Patient right foot was dressed with antimicrobial packing, gauze padding, Kerlix, Ace. Nursing wound care orders to change q.d. while inpatient.  Podiatry will monitor progression. No surgical acute intervention warranted at this time.  Podiatry will follow      Thank you for your consult. I will follow-up with patient. Please contact us if you have any additional questions.    Juanjose Luna DPM PGY-2  Podiatric Medicine & Surgery  [facility]  Secure Chat Preferred  Pager: [phone]    [facility] - Med Surg

## 2024-12-14 LAB
ANION GAP SERPL CALC-SCNC: 10 MMOL/L (ref 8–16)
BASOPHILS # BLD AUTO: 0.04 K/UL (ref 0–0.2)
BASOPHILS NFR BLD: 0.6 % (ref 0–1.9)
BUN SERPL-MCNC: 13 MG/DL (ref 6–20)
CALCIUM SERPL-MCNC: 8.4 MG/DL (ref 8.7–10.5)
CHLORIDE SERPL-SCNC: 98 MMOL/L (ref 95–110)
CO2 SERPL-SCNC: 25 MMOL/L (ref 23–29)
CREAT SERPL-MCNC: 1.3 MG/DL (ref 0.5–1.4)
DIFFERENTIAL METHOD BLD: ABNORMAL
EOSINOPHIL # BLD AUTO: 0.1 K/UL (ref 0–0.5)
EOSINOPHIL NFR BLD: 1.5 % (ref 0–8)
ERYTHROCYTE [DISTWIDTH] IN BLOOD BY AUTOMATED COUNT: 13.3 % (ref 11.5–14.5)
EST. GFR  (NO RACE VARIABLE): >60 ML/MIN/1.73 M^2
GLUCOSE SERPL-MCNC: 267 MG/DL (ref 70–110)
HCT VFR BLD AUTO: 40.8 % (ref 40–54)
HGB BLD-MCNC: 13.2 G/DL (ref 14–18)
IMM GRANULOCYTES # BLD AUTO: 0.03 K/UL (ref 0–0.04)
IMM GRANULOCYTES NFR BLD AUTO: 0.5 % (ref 0–0.5)
LYMPHOCYTES # BLD AUTO: 1.1 K/UL (ref 1–4.8)
LYMPHOCYTES NFR BLD: 18.3 % (ref 18–48)
MAGNESIUM SERPL-MCNC: 1.8 MG/DL (ref 1.6–2.6)
MCH RBC QN AUTO: 28.3 PG (ref 27–31)
MCHC RBC AUTO-ENTMCNC: 32.4 G/DL (ref 32–36)
MCV RBC AUTO: 87 FL (ref 82–98)
MONOCYTES # BLD AUTO: 1.4 K/UL (ref 0.3–1)
MONOCYTES NFR BLD: 22.2 % (ref 4–15)
NEUTROPHILS # BLD AUTO: 3.5 K/UL (ref 1.8–7.7)
NEUTROPHILS NFR BLD: 56.9 % (ref 38–73)
NRBC BLD-RTO: 0 /100 WBC
PHOSPHATE SERPL-MCNC: 3.5 MG/DL (ref 2.7–4.5)
PLATELET # BLD AUTO: 280 K/UL (ref 150–450)
PMV BLD AUTO: 9.5 FL (ref 9.2–12.9)
POCT GLUCOSE: 251 MG/DL (ref 70–110)
POCT GLUCOSE: 251 MG/DL (ref 70–110)
POCT GLUCOSE: 308 MG/DL (ref 70–110)
POCT GLUCOSE: 313 MG/DL (ref 70–110)
POCT GLUCOSE: 328 MG/DL (ref 70–110)
POTASSIUM SERPL-SCNC: 4 MMOL/L (ref 3.5–5.1)
RBC # BLD AUTO: 4.67 M/UL (ref 4.6–6.2)
SODIUM SERPL-SCNC: 133 MMOL/L (ref 136–145)
VANCOMYCIN TROUGH SERPL-MCNC: 11.2 UG/ML (ref 10–22)
WBC # BLD AUTO: 6.18 K/UL (ref 3.9–12.7)

## 2024-12-14 PROCEDURE — 80202 ASSAY OF VANCOMYCIN: CPT | Performed by: PHYSICIAN ASSISTANT

## 2024-12-14 PROCEDURE — 25000003 PHARM REV CODE 250: Performed by: INTERNAL MEDICINE

## 2024-12-14 PROCEDURE — 36415 COLL VENOUS BLD VENIPUNCTURE: CPT | Performed by: PHYSICIAN ASSISTANT

## 2024-12-14 PROCEDURE — 25000003 PHARM REV CODE 250: Performed by: PHYSICIAN ASSISTANT

## 2024-12-14 PROCEDURE — 63600175 PHARM REV CODE 636 W HCPCS: Performed by: INTERNAL MEDICINE

## 2024-12-14 PROCEDURE — 83735 ASSAY OF MAGNESIUM: CPT | Performed by: PHYSICIAN ASSISTANT

## 2024-12-14 PROCEDURE — 85025 COMPLETE CBC W/AUTO DIFF WBC: CPT | Performed by: PHYSICIAN ASSISTANT

## 2024-12-14 PROCEDURE — 84100 ASSAY OF PHOSPHORUS: CPT | Performed by: PHYSICIAN ASSISTANT

## 2024-12-14 PROCEDURE — 63600175 PHARM REV CODE 636 W HCPCS: Performed by: PHYSICIAN ASSISTANT

## 2024-12-14 PROCEDURE — 21400001 HC TELEMETRY ROOM

## 2024-12-14 PROCEDURE — 80048 BASIC METABOLIC PNL TOTAL CA: CPT | Performed by: PHYSICIAN ASSISTANT

## 2024-12-14 RX ORDER — INSULIN GLARGINE 100 [IU]/ML
30 INJECTION, SOLUTION SUBCUTANEOUS 2 TIMES DAILY
Status: DISCONTINUED | OUTPATIENT
Start: 2024-12-14 | End: 2024-12-17

## 2024-12-14 RX ORDER — INSULIN ASPART 100 [IU]/ML
10 INJECTION, SOLUTION INTRAVENOUS; SUBCUTANEOUS
Status: DISCONTINUED | OUTPATIENT
Start: 2024-12-14 | End: 2024-12-17

## 2024-12-14 RX ORDER — VANCOMYCIN 2 GRAM/500 ML IN 0.9 % SODIUM CHLORIDE INTRAVENOUS
2000
Status: DISCONTINUED | OUTPATIENT
Start: 2024-12-14 | End: 2024-12-15

## 2024-12-14 RX ORDER — CARVEDILOL 25 MG/1
25 TABLET ORAL 2 TIMES DAILY
Status: DISCONTINUED | OUTPATIENT
Start: 2024-12-14 | End: 2024-12-17

## 2024-12-14 RX ADMIN — ATORVASTATIN CALCIUM 80 MG: 40 TABLET, FILM COATED ORAL at 08:12

## 2024-12-14 RX ADMIN — ENOXAPARIN SODIUM 40 MG: 40 INJECTION SUBCUTANEOUS at 04:12

## 2024-12-14 RX ADMIN — INSULIN ASPART 4 UNITS: 100 INJECTION, SOLUTION INTRAVENOUS; SUBCUTANEOUS at 11:12

## 2024-12-14 RX ADMIN — INSULIN ASPART 10 UNITS: 100 INJECTION, SOLUTION INTRAVENOUS; SUBCUTANEOUS at 11:12

## 2024-12-14 RX ADMIN — INSULIN GLARGINE 30 UNITS: 100 INJECTION, SOLUTION SUBCUTANEOUS at 08:12

## 2024-12-14 RX ADMIN — VANCOMYCIN HYDROCHLORIDE 2000 MG: 500 INJECTION, POWDER, LYOPHILIZED, FOR SOLUTION INTRAVENOUS at 08:12

## 2024-12-14 RX ADMIN — ACETAMINOPHEN 650 MG: 325 TABLET ORAL at 10:12

## 2024-12-14 RX ADMIN — INSULIN ASPART 1 UNITS: 100 INJECTION, SOLUTION INTRAVENOUS; SUBCUTANEOUS at 09:12

## 2024-12-14 RX ADMIN — INSULIN ASPART 4 UNITS: 100 INJECTION, SOLUTION INTRAVENOUS; SUBCUTANEOUS at 04:12

## 2024-12-14 RX ADMIN — CIPROFLOXACIN 400 MG: 2 INJECTION, SOLUTION INTRAVENOUS at 10:12

## 2024-12-14 RX ADMIN — CARVEDILOL 25 MG: 25 TABLET, FILM COATED ORAL at 08:12

## 2024-12-14 RX ADMIN — AMLODIPINE BESYLATE 10 MG: 10 TABLET ORAL at 08:12

## 2024-12-14 RX ADMIN — INSULIN ASPART 10 UNITS: 100 INJECTION, SOLUTION INTRAVENOUS; SUBCUTANEOUS at 04:12

## 2024-12-14 RX ADMIN — INSULIN GLARGINE 30 UNITS: 100 INJECTION, SOLUTION SUBCUTANEOUS at 09:12

## 2024-12-14 RX ADMIN — VANCOMYCIN HYDROCHLORIDE 1500 MG: 1.5 INJECTION, POWDER, LYOPHILIZED, FOR SOLUTION INTRAVENOUS at 08:12

## 2024-12-14 RX ADMIN — INSULIN ASPART 7 UNITS: 100 INJECTION, SOLUTION INTRAVENOUS; SUBCUTANEOUS at 08:12

## 2024-12-14 RX ADMIN — INSULIN ASPART 3 UNITS: 100 INJECTION, SOLUTION INTRAVENOUS; SUBCUTANEOUS at 08:12

## 2024-12-14 NOTE — SUBJECTIVE & OBJECTIVE
Subjective:     Interval History:  Patient resting in bed upon my arrival and states he has had no pain overnight.  He states he has been feeling much better and able to eat all of his food.  Patient states he has only ambulated to the restroom has tried to stay off of the foot.  Patient denies F/C/N/V.    Scheduled Meds:   amLODIPine  10 mg Oral Daily    atorvastatin  80 mg Oral QHS    carvediloL  25 mg Oral BID    ciprofloxacin  400 mg Intravenous Q12H    enoxparin  40 mg Subcutaneous Daily    insulin aspart U-100  10 Units Subcutaneous TIDWM    insulin glargine U-100  30 Units Subcutaneous BID    vancomycin (VANCOCIN) IV (PEDS and ADULTS)  2,000 mg Intravenous Q12H     Continuous Infusions:  PRN Meds:  Current Facility-Administered Medications:     acetaminophen, 650 mg, Oral, Q4H PRN    albuterol-ipratropium, 3 mL, Nebulization, Q4H PRN    bisacodyL, 10 mg, Rectal, Daily PRN    dextrose 10%, 12.5 g, Intravenous, PRN    dextrose 10%, 25 g, Intravenous, PRN    diphenhydrAMINE, 25 mg, Intravenous, Q12H PRN    glucagon (human recombinant), 1 mg, Intramuscular, PRN    glucose, 16 g, Oral, PRN    glucose, 24 g, Oral, PRN    insulin aspart U-100, 0-5 Units, Subcutaneous, QID (AC + HS) PRN    melatonin, 6 mg, Oral, Nightly PRN    polyethylene glycol, 17 g, Oral, Daily PRN    Pharmacy to dose Vancomycin consult, , , Once **AND** vancomycin - pharmacy to dose, , Intravenous, pharmacy to manage frequency    Review of Systems   Constitutional:  Negative for activity change, appetite change, chills, fatigue and fever.   Respiratory:  Positive for cough. Negative for shortness of breath.    Cardiovascular:  Negative for leg swelling.   Gastrointestinal:  Negative for nausea and vomiting.   Musculoskeletal:  Negative for joint swelling.   Skin:  Positive for wound. Negative for color change.   Neurological:  Positive for numbness.     Objective:     Vital Signs (Most Recent):  Temp: 98.3 °F (36.8 °C) (12/14/24 1546)  Pulse:  62 (12/14/24 1546)  Resp: 16 (12/14/24 1546)  BP: (!) 156/77 (12/14/24 1546)  SpO2: 96 % (12/14/24 1546) Vital Signs (24h Range):  Temp:  [97.9 °F (36.6 °C)-99.6 °F (37.6 °C)] 98.3 °F (36.8 °C)  Pulse:  [60-98] 62  Resp:  [16-18] 16  SpO2:  [90 %-96 %] 96 %  BP: (128-156)/(68-88) 156/77     Weight: 126.8 kg (279 lb 9.6 oz)  Body mass index is 35.9 kg/m².    Foot Exam    General  Orientation: alert and oriented to person, place, and time       Right Foot/Ankle     Inspection and Palpation  Tenderness: none   Swelling: (3rd toe)  Skin Exam: callus, drainage, dry skin, skin changes, abnormal color and ulcer; skin not intact, no cellulitis and no erythema     Neurovascular  Dorsalis pedis: 1+  Posterior tibial: doppler  Saphenous nerve sensation: absent  Tibial nerve sensation: absent  Superficial peroneal nerve sensation: absent  Deep peroneal nerve sensation: absent  Sural nerve sensation: absent    Comments  Partial 3rd and 5th ray amputated. Partial 2nd metatarsal amputated with digit left intact.   Incision site at dorsal 2nd metatarsal head noted to contain wound with fibrogranular base, copious purulence and slough present upon removing the dressing and upon removal of the packing. Wound probes to bone and is Non tender to palpation.  Negligible malodor.      Left Foot/Ankle      Inspection and Palpation  Tenderness: none   Swelling: none   Skin Exam: callus and dry skin; no drainage, no cellulitis, no ulcer and no erythema     Neurovascular  Dorsalis pedis: 1+  Posterior tibial: doppler  Saphenous nerve sensation: absent  Tibial nerve sensation: absent  Superficial peroneal nerve sensation: absent  Deep peroneal nerve sensation: absent  Sural nerve sensation: absent    Comments  Partial 1st ray amputated, stable and healed.  Scab located at dorsal 3rd digit, plantar right foot, as well as erythema on anterior leg; otherwise no other discernible wounds in left lower extremity                    Laboratory:  A1C:    Recent Labs   Lab 11/27/24  0739   HGBA1C 10.2*     Blood Cultures:   Recent Labs   Lab 12/13/24  0100 12/13/24  0125   LABBLOO No Growth to date  No Growth to date No Growth to date  No Growth to date     BMP:   Recent Labs   Lab 12/14/24  0405   *   *   K 4.0   CL 98   CO2 25   BUN 13   CREATININE 1.3   CALCIUM 8.4*   MG 1.8     CBC:   Recent Labs   Lab 12/14/24  0405   WBC 6.18   RBC 4.67   HGB 13.2*   HCT 40.8      MCV 87   MCH 28.3   MCHC 32.4     CMP:   Recent Labs   Lab 12/12/24  1609 12/13/24  0603 12/14/24  0405   *   < > 267*   CALCIUM 8.6*   < > 8.4*   ALBUMIN 2.6*  --   --    PROT 7.5  --   --    *   < > 133*   K 4.9   < > 4.0   CO2 22*   < > 25   CL 96   < > 98   BUN 19   < > 13   CREATININE 1.4   < > 1.3   ALKPHOS 90  --   --    ALT 21  --   --    AST 23  --   --    BILITOT 0.7  --   --     < > = values in this interval not displayed.     CRP:   Recent Labs   Lab 12/12/24 2112   .2*     ESR:   Recent Labs   Lab 12/12/24 2112   SEDRATE 83*     LFTs:   Recent Labs   Lab 12/12/24  1609   ALT 21   AST 23   ALKPHOS 90   BILITOT 0.7   PROT 7.5   ALBUMIN 2.6*     Wound Cultures:   Recent Labs   Lab 09/12/24  1239 12/13/24  1012   LABAERO STAPHYLOCOCCUS AUREUS  Many  *  SERRATIA MARCESCENS   Few  *  CITROBACTER FREUNDII  Rare  Skin josselin also present  * STAPHYLOCOCCUS AUREUS  Moderate  Susceptibility pending  *     Microbiology Results (last 7 days)       Procedure Component Value Units Date/Time    Culture, Anaerobe [[id number]] Collected: 12/13/24 1012    Order Status: Completed Specimen: Bone from Foot, Right Updated: 12/14/24 1416     Anaerobic Culture Culture in progress    Aerobic culture [[id number]]  (Abnormal) Collected: 12/13/24 1012    Order Status: Completed Specimen: Bone from Foot, Right Updated: 12/14/24 0842     Aerobic Bacterial Culture STAPHYLOCOCCUS AUREUS  Moderate  Susceptibility pending      Blood culture [[id number]] Collected: 12/13/24  0100    Order Status: Completed Specimen: Blood from Peripheral, Forearm, Left Updated: 12/14/24 0613     Blood Culture, Routine No Growth to date      No Growth to date    Blood culture [[id number]] Collected: 12/13/24 0125    Order Status: Completed Specimen: Blood from Peripheral, Hand, Right Updated: 12/14/24 0613     Blood Culture, Routine No Growth to date      No Growth to date    Gram stain [[id number]] Collected: 12/13/24 1012    Order Status: Completed Specimen: Bone from Foot, Right Updated: 12/13/24 1417     Gram Stain Result No WBC's      No organisms seen    Blood culture [[id number]]     Order Status: Canceled Specimen: Blood     Blood culture [[id number]]     Order Status: Canceled Specimen: Blood     Influenza A & B by Molecular [[id number]] Collected: 12/12/24 1612    Order Status: Completed Specimen: Nasopharyngeal Swab Updated: 12/12/24 1730     Influenza A, Molecular Negative     Influenza B, Molecular Negative     Flu A & B Source Nasal swab          Recent Labs   Lab 12/12/24  1650   COLORU Yellow   SPECGRAV 1.030   PHUR 6.0   PROTEINUA 2+*   BACTERIA Occasional   NITRITE Negative   LEUKOCYTESUR Negative   HYALINECASTS 26*     All pertinent labs reviewed within the last 24 hours.    Diagnostic Results:  I have reviewed all pertinent imaging results/findings within the past 24 hours.  MRI Forefoot WO Contrast RT  Narrative: EXAMINATION:  MRI FOREFOOT WO CONTRAST RT    CLINICAL HISTORY:  Osteomyelitis, foot;    TECHNIQUE:  Noncontrast MRI of the right forefoot.    COMPARISON:  X-ray 12/12/2024    FINDINGS:  Bones and joints: Operative changes of 2nd metatarsal head resection, 3rd digit resection and 5th digit resection at the level of the metatarsal shaft.  Soft tissue skin defect over the dorsal aspect of the foot at the level of approximately the 2nd metatarsal stump.  Abnormal T2 hyperintensity is seen within the bone of the 3rd metatarsal without definitive T1 hypointensity.  Fluid signal  surrounds the periosteum of the 2nd metatarsal shaft as well as well-defined linear T2 hyperintensity within the medullary cavity of the metatarsal shaft.  Arthritic changes of the midfoot.    Soft tissues: Diffuse soft tissue edema is present and there is muscular edema and mild fatty atrophy in keeping with chronic denervation.  Impression: Cellulitis of the forefoot with postoperative changes as described.    High likelihood of osteomyelitis of the 3rd metatarsal shaft.    Periostitis and intramedullary signal abnormality of the 2nd metatarsal shaft, in this patient with relatively recent osteotomy and soft tissue defect extending to the medullary cavity also representing high likelihood of osteomyelitis.  Differential diagnosis includes a longitudinal stress fracture.    Electronically signed by: Edgar Serrano Jr  Date:    12/13/2024  Time:    07:27

## 2024-12-14 NOTE — PLAN OF CARE
Problem: Adult Inpatient Plan of Care  Goal: Plan of Care Review  Outcome: Progressing  Goal: Patient-Specific Goal (Individualized)  Outcome: Progressing  Goal: Absence of Hospital-Acquired Illness or Injury  Outcome: Progressing  Goal: Optimal Comfort and Wellbeing  Outcome: Progressing  Goal: Readiness for Transition of Care  Outcome: Progressing     Problem: Infection  Goal: Absence of Infection Signs and Symptoms  Outcome: Progressing     Problem: Diabetes Comorbidity  Goal: Blood Glucose Level Within Targeted Range  Outcome: Progressing     Problem: Wound  Goal: Optimal Coping  Outcome: Progressing  Goal: Optimal Functional Ability  Outcome: Progressing  Goal: Absence of Infection Signs and Symptoms  Outcome: Progressing  Goal: Improved Oral Intake  Outcome: Progressing  Goal: Optimal Pain Control and Function  Outcome: Progressing  Goal: Skin Health and Integrity  Outcome: Progressing  Goal: Optimal Wound Healing  Outcome: Progressing     Problem: Fall Injury Risk  Goal: Absence of Fall and Fall-Related Injury  Outcome: Progressing     Problem: Acute Kidney Injury/Impairment  Goal: Fluid and Electrolyte Balance  Outcome: Progressing  Goal: Improved Oral Intake  Outcome: Progressing  Goal: Effective Renal Function  Outcome: Progressing

## 2024-12-14 NOTE — PROGRESS NOTES
Pharmacokinetic Assessment Follow Up: IV Vancomycin    Vancomycin serum concentration assessment(s):    The trough level was drawn correctly and can be used to guide therapy at this time. The measurement is below the desired definitive target range of 15 to 20 mcg/mL.    Vancomycin Regimen Plan:    -Change regimen to Vancomycin 2000 mg IV every 12 hours  -Next serum trough concentration measured prior to the 4th dose on 12/16 at 0800    Drug levels (last 3 results):  Recent Labs   Lab Result Units 12/14/24  0814   Vancomycin-Trough ug/mL 11.2       Pharmacy will continue to follow and monitor vancomycin.    Please contact pharmacy at extension [phone] for questions regarding this assessment.    Thank you for the consult,   Kenneth Lee       Patient brief summary:  Billy Rivera is a 59 y.o. male initiated on antimicrobial therapy with IV Vancomycin for treatment of Diabetic foot infection concerned for Osteomyelitis  The patient's current regimen is Vancomycin 1500mg Q12h    Drug Allergies:   Review of patient's allergies indicates:   Allergen Reactions    Penicillins Other (See Comments)     PCN allergy as a child - was told he went into a coma. Tolerates Cefazolin without adverse reactions    Shellfish containing products      Other reaction(s): Unknown    Vancomycin Itching     Tolerated vancomycin 7/2020    Bactrim  [sulfamethoxazole-trimethoprim] Rash       Actual Body Weight:   126.8 kg    Renal Function:   Estimated Creatinine Clearance: 85.5 mL/min (based on SCr of 1.3 mg/dL).,     Dialysis Method (if applicable):  N/A    CBC (last 72 hours):  Recent Labs   Lab Result Units 12/12/24  1609 12/13/24  0603 12/14/24  0405   WBC K/uL 9.54 7.00 6.18   Hemoglobin g/dL 13.7* 13.4* 13.2*   Hematocrit % 42.2 39.0* 40.8   Platelets K/uL 284 253 280   Gran % % 76.6* 74.0* 56.9   Lymph % % 6.7* 9.7* 18.3   Mono % % 15.1* 14.7 22.2*   Eosinophil % % 0.8 0.4 1.5   Basophil % % 0.4 0.6 0.6   Differential Method   Automated Automated Automated       Metabolic Panel (last 72 hours):  Recent Labs   Lab Result Units 12/12/24  1609 12/12/24  1650 12/13/24  0603 12/14/24  0405   Sodium mmol/L 130*  --  130* 133*   Potassium mmol/L 4.9  --  4.2 4.0   Chloride mmol/L 96  --  97 98   CO2 mmol/L 22*  --  22* 25   Glucose mg/dL 298*  --  292* 267*   Glucose, UA   --  4+*  --   --    BUN mg/dL 19  --  19 13   Creatinine mg/dL 1.4  --  1.2 1.3   Albumin g/dL 2.6*  --   --   --    Total Bilirubin mg/dL 0.7  --   --   --    Alkaline Phosphatase U/L 90  --   --   --    AST U/L 23  --   --   --    ALT U/L 21  --   --   --    Magnesium mg/dL  --   --  1.5* 1.8   Phosphorus mg/dL  --   --  2.6* 3.5       Vancomycin Administrations:  vancomycin given in the last 96 hours                     vancomycin 1,500 mg in 0.9% NaCl 250 mL IVPB (admixture device) (mg) 1,500 mg New Bag 12/14/24 0859     1,500 mg New Bag 12/13/24 2025     1,500 mg New Bag  0847    vancomycin 2 g in 0.9% sodium chloride 500 mL IVPB (mg) 2,000 mg New Bag 12/12/24 2124                    Microbiologic Results:  Microbiology Results (last 7 days)       Procedure Component Value Units Date/Time    Aerobic culture [[id number]]  (Abnormal) Collected: 12/13/24 1012    Order Status: Completed Specimen: Bone from Foot, Right Updated: 12/14/24 0842     Aerobic Bacterial Culture STAPHYLOCOCCUS AUREUS  Moderate  Susceptibility pending      Blood culture [[id number]] Collected: 12/13/24 0100    Order Status: Completed Specimen: Blood from Peripheral, Forearm, Left Updated: 12/14/24 0613     Blood Culture, Routine No Growth to date      No Growth to date    Blood culture [[id number]] Collected: 12/13/24 0125    Order Status: Completed Specimen: Blood from Peripheral, Hand, Right Updated: 12/14/24 0613     Blood Culture, Routine No Growth to date      No Growth to date    Gram stain [[id number]] Collected: 12/13/24 1012    Order Status: Completed Specimen: Bone from Foot, Right Updated:  12/13/24 1417     Gram Stain Result No WBC's      No organisms seen    Culture, Anaerobe [[id number]] Collected: 12/13/24 1012    Order Status: Sent Specimen: Bone from Foot, Right Updated: 12/13/24 1205    Blood culture [[id number]]     Order Status: Canceled Specimen: Blood     Blood culture [[id number]]     Order Status: Canceled Specimen: Blood     Influenza A & B by Molecular [[id number]] Collected: 12/12/24 1612    Order Status: Completed Specimen: Nasopharyngeal Swab Updated: 12/12/24 1730     Influenza A, Molecular Negative     Influenza B, Molecular Negative     Flu A & B Source Nasal swab

## 2024-12-14 NOTE — ASSESSMENT & PLAN NOTE
59-year-old male with PMH CAD, DM2, HLD, HTN, prior pedal amputations presents to ED 12/12/24 for generalized weakness, chills, nausea.  Patient has had recent history with Dr. Mingo Melara DPM for right foot 2nd metatarsal head excision DOS 12/6/24. Podiatry consulted for management of aforementioned problem.     Assessment: Post-surgical infection of right foot incision in setting of DM. Per exam, probe to bone s/p debridement of ulcer. XR of right foot was obtained showing amputation changes of 2nd and 5th rays at the level of metatarsal shafts; no osseous infiltration of infection observed.  MRI of right foot obtained showing OM of the 3rd metatarsal shaft, periostitis versus early OM like changes to the 2nd metatarsal shaft.     Plan:  Dressing room and packing removed and foot flushed with Vashe. Foot repacked with half-inch plain packing then dressed with drain sponges, ABD pad, Kerlix, and ACE bandage.  Bone biopsy pending currently speciation staph aureus susceptibility is pending.  Antibiotics per ID   Wound care orders in place  Podiatry will monitor progression. No surgical acute intervention warranted at this time.  Podiatry will follow

## 2024-12-14 NOTE — PROGRESS NOTES
[facility]  Podiatry  Progress Note    Patient Name: Billy Sheffield Miguel  MRN: [id number]  Admission Date: 12/12/2024  Hospital Length of Stay: 2 days  Attending Physician: Perico Florentino MD  Primary Care Provider: Augie Ruiz MD     Subjective:     Interval History:  Patient resting in bed upon my arrival and states he has had no pain overnight.  He states he has been feeling much better and able to eat all of his food.  Patient states he has only ambulated to the restroom has tried to stay off of the foot.  Patient denies F/C/N/V.    Scheduled Meds:   amLODIPine  10 mg Oral Daily    atorvastatin  80 mg Oral QHS    carvediloL  25 mg Oral BID    ciprofloxacin  400 mg Intravenous Q12H    enoxparin  40 mg Subcutaneous Daily    insulin aspart U-100  10 Units Subcutaneous TIDWM    insulin glargine U-100  30 Units Subcutaneous BID    vancomycin (VANCOCIN) IV (PEDS and ADULTS)  2,000 mg Intravenous Q12H     Continuous Infusions:  PRN Meds:  Current Facility-Administered Medications:     acetaminophen, 650 mg, Oral, Q4H PRN    albuterol-ipratropium, 3 mL, Nebulization, Q4H PRN    bisacodyL, 10 mg, Rectal, Daily PRN    dextrose 10%, 12.5 g, Intravenous, PRN    dextrose 10%, 25 g, Intravenous, PRN    diphenhydrAMINE, 25 mg, Intravenous, Q12H PRN    glucagon (human recombinant), 1 mg, Intramuscular, PRN    glucose, 16 g, Oral, PRN    glucose, 24 g, Oral, PRN    insulin aspart U-100, 0-5 Units, Subcutaneous, QID (AC + HS) PRN    melatonin, 6 mg, Oral, Nightly PRN    polyethylene glycol, 17 g, Oral, Daily PRN    Pharmacy to dose Vancomycin consult, , , Once **AND** vancomycin - pharmacy to dose, , Intravenous, pharmacy to manage frequency    Review of Systems   Constitutional:  Negative for activity change, appetite change, chills, fatigue and fever.   Respiratory:  Positive for cough. Negative for shortness of breath.    Cardiovascular:  Negative for leg swelling.   Gastrointestinal:  Negative for nausea and  vomiting.   Musculoskeletal:  Negative for joint swelling.   Skin:  Positive for wound. Negative for color change.   Neurological:  Positive for numbness.     Objective:     Vital Signs (Most Recent):  Temp: 98.3 °F (36.8 °C) (12/14/24 1546)  Pulse: 62 (12/14/24 1546)  Resp: 16 (12/14/24 1546)  BP: (!) 156/77 (12/14/24 1546)  SpO2: 96 % (12/14/24 1546) Vital Signs (24h Range):  Temp:  [97.9 °F (36.6 °C)-99.6 °F (37.6 °C)] 98.3 °F (36.8 °C)  Pulse:  [60-98] 62  Resp:  [16-18] 16  SpO2:  [90 %-96 %] 96 %  BP: (128-156)/(68-88) 156/77     Weight: 126.8 kg (279 lb 9.6 oz)  Body mass index is 35.9 kg/m².    Foot Exam    General  Orientation: alert and oriented to person, place, and time       Right Foot/Ankle     Inspection and Palpation  Tenderness: none   Swelling: (3rd toe)  Skin Exam: callus, drainage, dry skin, skin changes, abnormal color and ulcer; skin not intact, no cellulitis and no erythema     Neurovascular  Dorsalis pedis: 1+  Posterior tibial: doppler  Saphenous nerve sensation: absent  Tibial nerve sensation: absent  Superficial peroneal nerve sensation: absent  Deep peroneal nerve sensation: absent  Sural nerve sensation: absent    Comments  Partial 3rd and 5th ray amputated. Partial 2nd metatarsal amputated with digit left intact.   Incision site at dorsal 2nd metatarsal head noted to contain wound with fibrogranular base, copious purulence and slough present upon removing the dressing and upon removal of the packing. Wound probes to bone and is Non tender to palpation.  Negligible malodor.      Left Foot/Ankle      Inspection and Palpation  Tenderness: none   Swelling: none   Skin Exam: callus and dry skin; no drainage, no cellulitis, no ulcer and no erythema     Neurovascular  Dorsalis pedis: 1+  Posterior tibial: doppler  Saphenous nerve sensation: absent  Tibial nerve sensation: absent  Superficial peroneal nerve sensation: absent  Deep peroneal nerve sensation: absent  Sural nerve sensation:  absent    Comments  Partial 1st ray amputated, stable and healed.  Scab located at dorsal 3rd digit, plantar right foot, as well as erythema on anterior leg; otherwise no other discernible wounds in left lower extremity                    Laboratory:  A1C:   Recent Labs   Lab 11/27/24  0739   HGBA1C 10.2*     Blood Cultures:   Recent Labs   Lab 12/13/24  0100 12/13/24  0125   LABBLOO No Growth to date  No Growth to date No Growth to date  No Growth to date     BMP:   Recent Labs   Lab 12/14/24  0405   *   *   K 4.0   CL 98   CO2 25   BUN 13   CREATININE 1.3   CALCIUM 8.4*   MG 1.8     CBC:   Recent Labs   Lab 12/14/24  0405   WBC 6.18   RBC 4.67   HGB 13.2*   HCT 40.8      MCV 87   MCH 28.3   MCHC 32.4     CMP:   Recent Labs   Lab 12/12/24  1609 12/13/24  0603 12/14/24  0405   *   < > 267*   CALCIUM 8.6*   < > 8.4*   ALBUMIN 2.6*  --   --    PROT 7.5  --   --    *   < > 133*   K 4.9   < > 4.0   CO2 22*   < > 25   CL 96   < > 98   BUN 19   < > 13   CREATININE 1.4   < > 1.3   ALKPHOS 90  --   --    ALT 21  --   --    AST 23  --   --    BILITOT 0.7  --   --     < > = values in this interval not displayed.     CRP:   Recent Labs   Lab 12/12/24  2112   .2*     ESR:   Recent Labs   Lab 12/12/24  2112   SEDRATE 83*     LFTs:   Recent Labs   Lab 12/12/24  1609   ALT 21   AST 23   ALKPHOS 90   BILITOT 0.7   PROT 7.5   ALBUMIN 2.6*     Wound Cultures:   Recent Labs   Lab 09/12/24  1239 12/13/24  1012   LABAERO STAPHYLOCOCCUS AUREUS  Many  *  SERRATIA MARCESCENS   Few  *  CITROBACTER FREUNDII  Rare  Skin josselin also present  * STAPHYLOCOCCUS AUREUS  Moderate  Susceptibility pending  *     Microbiology Results (last 7 days)       Procedure Component Value Units Date/Time    Culture, Anaerobe [[id number]] Collected: 12/13/24 1012    Order Status: Completed Specimen: Bone from Foot, Right Updated: 12/14/24 1416     Anaerobic Culture Culture in progress    Aerobic culture [[id number]]   (Abnormal) Collected: 12/13/24 1012    Order Status: Completed Specimen: Bone from Foot, Right Updated: 12/14/24 0842     Aerobic Bacterial Culture STAPHYLOCOCCUS AUREUS  Moderate  Susceptibility pending      Blood culture [[id number]] Collected: 12/13/24 0100    Order Status: Completed Specimen: Blood from Peripheral, Forearm, Left Updated: 12/14/24 0613     Blood Culture, Routine No Growth to date      No Growth to date    Blood culture [[id number]] Collected: 12/13/24 0125    Order Status: Completed Specimen: Blood from Peripheral, Hand, Right Updated: 12/14/24 0613     Blood Culture, Routine No Growth to date      No Growth to date    Gram stain [[id number]] Collected: 12/13/24 1012    Order Status: Completed Specimen: Bone from Foot, Right Updated: 12/13/24 1417     Gram Stain Result No WBC's      No organisms seen    Blood culture [[id number]]     Order Status: Canceled Specimen: Blood     Blood culture [[id number]]     Order Status: Canceled Specimen: Blood     Influenza A & B by Molecular [[id number]] Collected: 12/12/24 1612    Order Status: Completed Specimen: Nasopharyngeal Swab Updated: 12/12/24 1730     Influenza A, Molecular Negative     Influenza B, Molecular Negative     Flu A & B Source Nasal swab          Recent Labs   Lab 12/12/24  1650   COLORU Yellow   SPECGRAV 1.030   PHUR 6.0   PROTEINUA 2+*   BACTERIA Occasional   NITRITE Negative   LEUKOCYTESUR Negative   HYALINECASTS 26*     All pertinent labs reviewed within the last 24 hours.    Diagnostic Results:  I have reviewed all pertinent imaging results/findings within the past 24 hours.  MRI Forefoot WO Contrast RT  Narrative: EXAMINATION:  MRI FOREFOOT WO CONTRAST RT    CLINICAL HISTORY:  Osteomyelitis, foot;    TECHNIQUE:  Noncontrast MRI of the right forefoot.    COMPARISON:  X-ray 12/12/2024    FINDINGS:  Bones and joints: Operative changes of 2nd metatarsal head resection, 3rd digit resection and 5th digit resection at the level of the  metatarsal shaft.  Soft tissue skin defect over the dorsal aspect of the foot at the level of approximately the 2nd metatarsal stump.  Abnormal T2 hyperintensity is seen within the bone of the 3rd metatarsal without definitive T1 hypointensity.  Fluid signal surrounds the periosteum of the 2nd metatarsal shaft as well as well-defined linear T2 hyperintensity within the medullary cavity of the metatarsal shaft.  Arthritic changes of the midfoot.    Soft tissues: Diffuse soft tissue edema is present and there is muscular edema and mild fatty atrophy in keeping with chronic denervation.  Impression: Cellulitis of the forefoot with postoperative changes as described.    High likelihood of osteomyelitis of the 3rd metatarsal shaft.    Periostitis and intramedullary signal abnormality of the 2nd metatarsal shaft, in this patient with relatively recent osteotomy and soft tissue defect extending to the medullary cavity also representing high likelihood of osteomyelitis.  Differential diagnosis includes a longitudinal stress fracture.    Electronically signed by: Edgar Serrano Jr  Date:    12/13/2024  Time:    07:27      Assessment/Plan:     Endocrine  * Diabetic foot ulcer associated with diabetes mellitus due to underlying condition  59-year-old male with PMH CAD, DM2, HLD, HTN, prior pedal amputations presents to ED 12/12/24 for generalized weakness, chills, nausea.  Patient has had recent history with Dr. Mingo Melara DPM for right foot 2nd metatarsal head excision DOS 12/6/24. Podiatry consulted for management of aforementioned problem.     Assessment: Post-surgical infection of right foot incision in setting of DM. Per exam, probe to bone s/p debridement of ulcer. XR of right foot was obtained showing amputation changes of 2nd and 5th rays at the level of metatarsal shafts; no osseous infiltration of infection observed.  MRI of right foot obtained showing OM of the 3rd metatarsal shaft, periostitis versus early OM  like changes to the 2nd metatarsal shaft.     Plan:  Dressing and packing removed and foot flushed with Vashe. Foot repacked with half-inch plain packing then dressed with drain sponges, ABD pad, Kerlix, and ACE bandage.  Bone biopsy pending currently speciating staph aureus susceptibility is pending.  Antibiotics per ID   Wound care orders in place  Podiatry will monitor progression. No surgical acute intervention warranted at this time.  Podiatry will follow        Rogerio Ross MD  Podiatry  [facility]

## 2024-12-14 NOTE — PROGRESS NOTES
Hospital Medicine  Progress note    Team: [facility] HOSP MED W Perico Florentino MD  Admit Date: 12/12/2024    Principal Problem:  Diabetic foot ulcer associated with diabetes mellitus due to underlying condition    HPI:Billy Rivera is a 59 y.o. male with a PMHx of T2DM, CAD, HTN, HLD, and right foot wound who presents to [facility] for evaluation of left foot infection. Patient had a chronic right foot wound for which he follows with podiatry. He had surgery intervention on his R metatarsal on 12/5. He was prescribed PO clinda and cipro prophylactially following the surgery. He reports felling generally unwell with chills, decreased appetite,generalized weakness since Tueaday. Reports falling earlier today due to generalized weakness, but was able to catch himself with his arms. No head trauma, neck/back pain or LOC. His R foot has been wrapped since the surgery so he hasn't look at his surgical incision until bandage was taken off in the ED. He has purulent drainage from incision site. Denies any LE wound, chest pain, SOB, N/V, abdominal pain or syncope.       Interval hx:    Feeling better.   Specimen bone from foot shows moderate Staph Aureus.   Other cultures are negative.     Physical Exam:    Vital Signs (Most Recent)  Temp: 98.6 °F (37 °C) (12/14/24 1106)  Pulse: 60 (12/14/24 1106)  Resp: 16 (12/14/24 1106)  BP: 133/79 (12/14/24 1106)  SpO2: 95 % (12/14/24 1106)    Intake/Output Summary (Last 24 hours) at 12/14/2024 1244  Last data filed at 12/13/2024 2245  Gross per 24 hour   Intake 952 ml   Output --   Net 952 ml     Physical Exam  Constitutional:       General: He is not in acute distress.     Appearance: He is obese.   HENT:      Mouth/Throat:      Mouth: Mucous membranes are moist.   Cardiovascular:      Rate and Rhythm: Normal rate and regular rhythm.      Heart sounds: No murmur heard.  Pulmonary:      Effort: Pulmonary effort is normal. No respiratory distress.   Abdominal:      General: Abdomen is flat.  There is no distension.      Tenderness: There is no abdominal tenderness.   Musculoskeletal:      Comments: Right food in bandage, clean and dry. Erythema noted lower 1/2 of right lower leg - poss. Cellulitis.   No edema.    Neurological:      General: No focal deficit present.      Mental Status: He is alert.   Psychiatric:         Mood and Affect: Mood normal.             Recent Labs   Lab 12/12/24  1609 12/13/24  0603 12/14/24  0405   WBC 9.54 7.00 6.18   HGB 13.7* 13.4* 13.2*   HCT 42.2 39.0* 40.8    253 280     Recent Labs   Lab 12/12/24  1609 12/13/24  0603 12/14/24  0405   * 130* 133*   K 4.9 4.2 4.0   CL 96 97 98   CO2 22* 22* 25   BUN 19 19 13   CREATININE 1.4 1.2 1.3   * 292* 267*   CALCIUM 8.6* 8.2* 8.4*   MG  --  1.5* 1.8   PHOS  --  2.6* 3.5     Recent Labs   Lab 12/12/24  1609   ALKPHOS 90   ALT 21   AST 23   ALBUMIN 2.6*   PROT 7.5   BILITOT 0.7        Recent Labs   Lab 12/13/24  0841 12/13/24  1221 12/13/24  1725 12/13/24  1939 12/14/24  0731 12/14/24  1105   POCTGLUCOSE 334* 315* 323* 344* 251* 328*       Scheduled Meds:   amLODIPine  10 mg Oral Daily    atorvastatin  80 mg Oral QHS    carvediloL  25 mg Oral BID    ciprofloxacin  400 mg Intravenous Q12H    enoxparin  40 mg Subcutaneous Daily    insulin aspart U-100  10 Units Subcutaneous TIDWM    insulin glargine U-100  30 Units Subcutaneous BID    vancomycin (VANCOCIN) IV (PEDS and ADULTS)  2,000 mg Intravenous Q12H     Continuous Infusions:      As Needed:    Current Facility-Administered Medications:     acetaminophen, 650 mg, Oral, Q4H PRN    albuterol-ipratropium, 3 mL, Nebulization, Q4H PRN    bisacodyL, 10 mg, Rectal, Daily PRN    dextrose 10%, 12.5 g, Intravenous, PRN    dextrose 10%, 25 g, Intravenous, PRN    diphenhydrAMINE, 25 mg, Intravenous, Q12H PRN    glucagon (human recombinant), 1 mg, Intramuscular, PRN    glucose, 16 g, Oral, PRN    glucose, 24 g, Oral, PRN    insulin aspart U-100, 0-5 Units, Subcutaneous, QID  (AC + HS) PRN    melatonin, 6 mg, Oral, Nightly PRN    polyethylene glycol, 17 g, Oral, Daily PRN    Pharmacy to dose Vancomycin consult, , , Once **AND** vancomycin - pharmacy to dose, , Intravenous, pharmacy to manage frequency    Assessment and Plan  / Problems managed today    * Diabetic foot ulcer associated with diabetes mellitus due to underlying condition  - afebrile without leukocytosis  - inflammatory markers elevated  - XR, MRI confirms osteomyelitis of metatarsal 2 and 3  - on  IV vanc and cipro  - 12/13 - podiatry debrided and took bone Bx. - Staph Aureus  - ID consult appreciated    Subacute osteomyelitis of right foot  See Diabetic food infection      Type 2 diabetes mellitus 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, with long-term current use of insulin  - overall poor control  - home regimen: insulin 70/30 40U in AM, 30U in PM    - Rx. Adjusted.   - Lantus 30 units BID  - Aspart 10 units with meals.   - LDSSI, ACHS accuchecks  Lab Results   Component Value Date    HGBA1C 10.2 (H) 11/27/2024       JOVANNI (acute kidney injury)  JOVANNI is likely due to pre-renal azotemia due to intravascular volume depletion. Baseline creatinine is  1.2 . Most recent creatinine and eGFR are listed below.  Recent Labs     12/12/24  1609 12/13/24  0603   CREATININE 1.4 1.2   EGFRNORACEVR 57.5* >60.0      Plan  - JOVANNI is improving  - Avoid nephrotoxins and renally dose meds for GFR listed above  - Monitor urine output, serial BMP, and adjust therapy as needed  - holding ACE  - improved    Obesity  Body mass index is 35.9 kg/m². Morbid obesity complicates all aspects of disease management from diagnostic modalities to treatment.    Hx of colon cancer, stage I  - noted hx    Hyponatremia  Hyponatremia is likely due to Dehydration/hypovolemia and Hyperglycemia - corrected ca. 133. The patient's most recent sodium results are listed below.  Recent Labs     12/12/24  1609 12/13/24  0603    * 130*     Plan  - Correct the sodium by 4-6mEq in 24 hours.   - Obtain the following studies: serial labs.  - Will treat the hyponatremia with IV fluids as follows: 100 cc/hr  - Monitor sodium Daily.   - Patient hyponatremia is improving    Essential hypertension  - on Amlodipine 10, Carvediolol 12.5 BID and Lisinopril 40 mg daily  - BP currently controlled  - will resume as feasible, currently holding ACE due to JOVANNI.         Discharge Planning   DARVIN: 12/16/2024     Code Status: Full Code   Is the patient medically ready for discharge?:     Reason for patient still in hospital (select all that apply): Patient trending condition  Discharge Plan A: Home with family          Perico Florentino MD  Sr. Staff Physician  [facility]

## 2024-12-14 NOTE — ASSESSMENT & PLAN NOTE
Body mass index is 35.9 kg/m². Morbid obesity complicates all aspects of disease management from diagnostic modalities to treatment.

## 2024-12-15 LAB
ANION GAP SERPL CALC-SCNC: 8 MMOL/L (ref 8–16)
BASOPHILS # BLD AUTO: 0.02 K/UL (ref 0–0.2)
BASOPHILS NFR BLD: 0.3 % (ref 0–1.9)
BUN SERPL-MCNC: 12 MG/DL (ref 6–20)
CALCIUM SERPL-MCNC: 8.2 MG/DL (ref 8.7–10.5)
CHLORIDE SERPL-SCNC: 100 MMOL/L (ref 95–110)
CO2 SERPL-SCNC: 25 MMOL/L (ref 23–29)
CREAT SERPL-MCNC: 1.1 MG/DL (ref 0.5–1.4)
CRP SERPL-MCNC: 62.4 MG/L (ref 0–8.2)
DIFFERENTIAL METHOD BLD: ABNORMAL
EOSINOPHIL # BLD AUTO: 0.2 K/UL (ref 0–0.5)
EOSINOPHIL NFR BLD: 3.4 % (ref 0–8)
ERYTHROCYTE [DISTWIDTH] IN BLOOD BY AUTOMATED COUNT: 13.1 % (ref 11.5–14.5)
EST. GFR  (NO RACE VARIABLE): >60 ML/MIN/1.73 M^2
GLUCOSE SERPL-MCNC: 175 MG/DL (ref 70–110)
HCT VFR BLD AUTO: 34.3 % (ref 40–54)
HGB BLD-MCNC: 11.7 G/DL (ref 14–18)
IMM GRANULOCYTES # BLD AUTO: 0.03 K/UL (ref 0–0.04)
IMM GRANULOCYTES NFR BLD AUTO: 0.5 % (ref 0–0.5)
LYMPHOCYTES # BLD AUTO: 1.2 K/UL (ref 1–4.8)
LYMPHOCYTES NFR BLD: 19.6 % (ref 18–48)
MAGNESIUM SERPL-MCNC: 1.7 MG/DL (ref 1.6–2.6)
MCH RBC QN AUTO: 29 PG (ref 27–31)
MCHC RBC AUTO-ENTMCNC: 34.1 G/DL (ref 32–36)
MCV RBC AUTO: 85 FL (ref 82–98)
MONOCYTES # BLD AUTO: 1.3 K/UL (ref 0.3–1)
MONOCYTES NFR BLD: 22.3 % (ref 4–15)
MRSA ID BY PCR: NEGATIVE
NEUTROPHILS # BLD AUTO: 3.2 K/UL (ref 1.8–7.7)
NEUTROPHILS NFR BLD: 53.9 % (ref 38–73)
NRBC BLD-RTO: 0 /100 WBC
PHOSPHATE SERPL-MCNC: 4.1 MG/DL (ref 2.7–4.5)
PLATELET # BLD AUTO: 244 K/UL (ref 150–450)
PMV BLD AUTO: 9.9 FL (ref 9.2–12.9)
POCT GLUCOSE: 175 MG/DL (ref 70–110)
POCT GLUCOSE: 224 MG/DL (ref 70–110)
POCT GLUCOSE: 257 MG/DL (ref 70–110)
POCT GLUCOSE: 266 MG/DL (ref 70–110)
POTASSIUM SERPL-SCNC: 3.6 MMOL/L (ref 3.5–5.1)
RBC # BLD AUTO: 4.04 M/UL (ref 4.6–6.2)
SODIUM SERPL-SCNC: 133 MMOL/L (ref 136–145)
STAPH AUREUS ID BY PCR: POSITIVE
WBC # BLD AUTO: 5.87 K/UL (ref 3.9–12.7)

## 2024-12-15 PROCEDURE — 36415 COLL VENOUS BLD VENIPUNCTURE: CPT | Performed by: INTERNAL MEDICINE

## 2024-12-15 PROCEDURE — 87040 BLOOD CULTURE FOR BACTERIA: CPT | Performed by: PHYSICIAN ASSISTANT

## 2024-12-15 PROCEDURE — 25000003 PHARM REV CODE 250: Performed by: PHYSICIAN ASSISTANT

## 2024-12-15 PROCEDURE — 84100 ASSAY OF PHOSPHORUS: CPT | Performed by: PHYSICIAN ASSISTANT

## 2024-12-15 PROCEDURE — 99233 SBSQ HOSP IP/OBS HIGH 50: CPT | Mod: ,,, | Performed by: PHYSICIAN ASSISTANT

## 2024-12-15 PROCEDURE — 36415 COLL VENOUS BLD VENIPUNCTURE: CPT | Performed by: PHYSICIAN ASSISTANT

## 2024-12-15 PROCEDURE — 83735 ASSAY OF MAGNESIUM: CPT | Performed by: PHYSICIAN ASSISTANT

## 2024-12-15 PROCEDURE — 86140 C-REACTIVE PROTEIN: CPT | Performed by: PHYSICIAN ASSISTANT

## 2024-12-15 PROCEDURE — 63600175 PHARM REV CODE 636 W HCPCS: Performed by: INTERNAL MEDICINE

## 2024-12-15 PROCEDURE — 21400001 HC TELEMETRY ROOM

## 2024-12-15 PROCEDURE — 63600175 PHARM REV CODE 636 W HCPCS: Performed by: PHYSICIAN ASSISTANT

## 2024-12-15 PROCEDURE — 85025 COMPLETE CBC W/AUTO DIFF WBC: CPT | Performed by: PHYSICIAN ASSISTANT

## 2024-12-15 PROCEDURE — 80048 BASIC METABOLIC PNL TOTAL CA: CPT | Performed by: PHYSICIAN ASSISTANT

## 2024-12-15 PROCEDURE — 36415 COLL VENOUS BLD VENIPUNCTURE: CPT | Mod: XB | Performed by: PHYSICIAN ASSISTANT

## 2024-12-15 PROCEDURE — 25000003 PHARM REV CODE 250: Performed by: INTERNAL MEDICINE

## 2024-12-15 PROCEDURE — 87040 BLOOD CULTURE FOR BACTERIA: CPT | Mod: 59 | Performed by: INTERNAL MEDICINE

## 2024-12-15 RX ORDER — CEFAZOLIN 2 G/1
2 INJECTION, POWDER, FOR SOLUTION INTRAMUSCULAR; INTRAVENOUS
Status: DISCONTINUED | OUTPATIENT
Start: 2024-12-15 | End: 2024-12-18 | Stop reason: HOSPADM

## 2024-12-15 RX ADMIN — VANCOMYCIN HYDROCHLORIDE 2000 MG: 500 INJECTION, POWDER, LYOPHILIZED, FOR SOLUTION INTRAVENOUS at 09:12

## 2024-12-15 RX ADMIN — CARVEDILOL 25 MG: 25 TABLET, FILM COATED ORAL at 09:12

## 2024-12-15 RX ADMIN — INSULIN ASPART 3 UNITS: 100 INJECTION, SOLUTION INTRAVENOUS; SUBCUTANEOUS at 05:12

## 2024-12-15 RX ADMIN — CEFAZOLIN 2 G: 2 INJECTION, POWDER, FOR SOLUTION INTRAMUSCULAR; INTRAVENOUS at 05:12

## 2024-12-15 RX ADMIN — ENOXAPARIN SODIUM 40 MG: 40 INJECTION SUBCUTANEOUS at 05:12

## 2024-12-15 RX ADMIN — INSULIN GLARGINE 30 UNITS: 100 INJECTION, SOLUTION SUBCUTANEOUS at 08:12

## 2024-12-15 RX ADMIN — INSULIN ASPART 10 UNITS: 100 INJECTION, SOLUTION INTRAVENOUS; SUBCUTANEOUS at 09:12

## 2024-12-15 RX ADMIN — INSULIN GLARGINE 30 UNITS: 100 INJECTION, SOLUTION SUBCUTANEOUS at 09:12

## 2024-12-15 RX ADMIN — CARVEDILOL 25 MG: 25 TABLET, FILM COATED ORAL at 08:12

## 2024-12-15 RX ADMIN — INSULIN ASPART 1 UNITS: 100 INJECTION, SOLUTION INTRAVENOUS; SUBCUTANEOUS at 08:12

## 2024-12-15 RX ADMIN — INSULIN ASPART 3 UNITS: 100 INJECTION, SOLUTION INTRAVENOUS; SUBCUTANEOUS at 12:12

## 2024-12-15 RX ADMIN — AMLODIPINE BESYLATE 10 MG: 10 TABLET ORAL at 09:12

## 2024-12-15 RX ADMIN — CIPROFLOXACIN 400 MG: 2 INJECTION, SOLUTION INTRAVENOUS at 12:12

## 2024-12-15 RX ADMIN — ATORVASTATIN CALCIUM 80 MG: 40 TABLET, FILM COATED ORAL at 08:12

## 2024-12-15 RX ADMIN — INSULIN ASPART 10 UNITS: 100 INJECTION, SOLUTION INTRAVENOUS; SUBCUTANEOUS at 05:12

## 2024-12-15 RX ADMIN — INSULIN ASPART 10 UNITS: 100 INJECTION, SOLUTION INTRAVENOUS; SUBCUTANEOUS at 12:12

## 2024-12-15 NOTE — ASSESSMENT & PLAN NOTE
I have reviewed hospital notes from   service and other specialty providers. I have also reviewed CBC, CMP/BMP,  cultures and imaging with my interpretation as documented.      59 year old male with insulin dependent diabetes, CAD, HLD, with history of prior L foot osteomyelitis s/p left foot hallux amputation, partial left 2nd toe amputation, and right 3rd and 5th toe amputation presented to ER with R foot infection after recent 2nd metatarsal head resection on 12/5/24 with podiatry. Sent home with PO Clinda and Cipro. Started to have weakness, chills, and nausea 5 days after surgery. Presented to ER 12/13 and found to have purulence from recent incision site. Recently treated for infection of R foot with prolonged course of Levo and Doxy (end of care in November)    On admit, afebrile, otherwise VSS. No leukocytosis. Elevated .2 and ESR 83.  MRI R forefoot with cellulitis and high likelihood of osteomyelitis of 2nd metatarsal shaft and 3rd metatarsal shaft. Started on Vancomycin and Cipro in ER which he remains on.  Podiatry following and notes wound probes to bone.  Bone biopsy done showing staph aureus.Blood cultures ordered 12/13 show staph aureus with BC ID indicating MSSA.  Patient clinically improved.    Recommendations / Plan:  DC IV Vanc and Cipro,  Start Ancef given MSSA on blood culture as well as likely we will be in foot bone culture  Follow up podiatry plans  Repeat and follow up blood cultures  2D echo    -- Discussed with ID staff and primary team   -- ID will continue to follow w/ further recs.

## 2024-12-15 NOTE — PROGRESS NOTES
VANCOMYCIN DOSING BY PHARMACY DISCONTINUATION NOTE    Blily Rivera is a 59 y.o. male who had been consulted for vancomycin dosing.    The pharmacy consult for vancomycin dosing has been discontinued.     Vancomycin Dosing by Pharmacy Consult will sign-off. Please reconsult if necessary. Thank you for allowing us to participate in this patient's care.     Destinee Phillips, Pharm.D.  Phone [phone]

## 2024-12-15 NOTE — SUBJECTIVE & OBJECTIVE
Subjective:     Interval History: Patient seated in bedside chair upon my arrival. States that his appetite is back and he is continuing to feel much better. He denies N/V/F/C and any pain to his foot.        Scheduled Meds:   amLODIPine  10 mg Oral Daily    atorvastatin  80 mg Oral QHS    carvediloL  25 mg Oral BID    ciprofloxacin  400 mg Intravenous Q12H    enoxparin  40 mg Subcutaneous Daily    insulin aspart U-100  10 Units Subcutaneous TIDWM    insulin glargine U-100  30 Units Subcutaneous BID    vancomycin (VANCOCIN) IV (PEDS and ADULTS)  2,000 mg Intravenous Q12H     Continuous Infusions:  PRN Meds:  Current Facility-Administered Medications:     acetaminophen, 650 mg, Oral, Q4H PRN    albuterol-ipratropium, 3 mL, Nebulization, Q4H PRN    bisacodyL, 10 mg, Rectal, Daily PRN    dextrose 10%, 12.5 g, Intravenous, PRN    dextrose 10%, 25 g, Intravenous, PRN    diphenhydrAMINE, 25 mg, Intravenous, Q12H PRN    glucagon (human recombinant), 1 mg, Intramuscular, PRN    glucose, 16 g, Oral, PRN    glucose, 24 g, Oral, PRN    insulin aspart U-100, 0-5 Units, Subcutaneous, QID (AC + HS) PRN    melatonin, 6 mg, Oral, Nightly PRN    polyethylene glycol, 17 g, Oral, Daily PRN    Pharmacy to dose Vancomycin consult, , , Once **AND** vancomycin - pharmacy to dose, , Intravenous, pharmacy to manage frequency    Review of Systems   Constitutional:  Negative for activity change, appetite change, chills, fatigue and fever.   Respiratory:  Negative for shortness of breath.    Cardiovascular:  Negative for leg swelling.   Gastrointestinal:  Negative for nausea and vomiting.   Musculoskeletal:  Negative for joint swelling.   Skin:  Positive for wound. Negative for color change.   Neurological:  Positive for numbness.     Objective:     Vital Signs (Most Recent):  Temp: 98 °F (36.7 °C) (12/15/24 1155)  Pulse: (!) 48 (12/15/24 1155)  Resp: 18 (12/15/24 1155)  BP: 120/75 (12/15/24 1155)  SpO2: 97 % (12/15/24 1155) Vital Signs (24h  Range):  Temp:  [97.5 °F (36.4 °C)-98.7 °F (37.1 °C)] 98 °F (36.7 °C)  Pulse:  [48-62] 48  Resp:  [16-18] 18  SpO2:  [93 %-98 %] 97 %  BP: (102-156)/(52-81) 120/75     Weight: 126.8 kg (279 lb 9.6 oz)  Body mass index is 35.9 kg/m².    Foot Exam    General  Orientation: alert and oriented to person, place, and time       Right Foot/Ankle     Inspection and Palpation  Tenderness: none   Swelling: (3rd toe)  Skin Exam: callus, drainage, dry skin, skin changes, abnormal color and ulcer; skin not intact, no cellulitis and no erythema     Neurovascular  Dorsalis pedis: 1+  Posterior tibial: doppler  Saphenous nerve sensation: absent  Tibial nerve sensation: absent  Superficial peroneal nerve sensation: absent  Deep peroneal nerve sensation: absent  Sural nerve sensation: absent    Comments  Partial 3rd and 5th ray amputated. Partial 2nd metatarsal amputated with digit left intact. Incision site at dorsal 2nd metatarsal head noted to contain wound with fibrous base, continued copious purulence and slough present upon removing the dressing and upon removal of the packing. Wound probes to bone and is Non tender to palpation.  Negligible malodor.      Left Foot/Ankle      Inspection and Palpation  Tenderness: none   Swelling: none   Skin Exam: callus and dry skin; no drainage, no cellulitis, no ulcer and no erythema     Neurovascular  Dorsalis pedis: 1+  Posterior tibial: doppler  Saphenous nerve sensation: absent  Tibial nerve sensation: absent  Superficial peroneal nerve sensation: absent  Deep peroneal nerve sensation: absent  Sural nerve sensation: absent    Comments  Partial 1st ray amputated, stable and healed.  Scab located at dorsal 3rd digit, plantar right foot, as well as erythema on anterior leg; otherwise no other discernible wounds in left lower extremity              Laboratory:  A1C:   Recent Labs   Lab 11/27/24  0739   HGBA1C 10.2*     BMP:   Recent Labs   Lab 12/15/24  0329   *   *   K 3.6   CL  100   CO2 25   BUN 12   CREATININE 1.1   CALCIUM 8.2*   MG 1.7     CBC:   Recent Labs   Lab 12/15/24  0329   WBC 5.87   RBC 4.04*   HGB 11.7*   HCT 34.3*      MCV 85   MCH 29.0   MCHC 34.1     CMP:   Recent Labs   Lab 12/12/24  1609 12/13/24  0603 12/15/24  0329   *   < > 175*   CALCIUM 8.6*   < > 8.2*   ALBUMIN 2.6*  --   --    PROT 7.5  --   --    *   < > 133*   K 4.9   < > 3.6   CO2 22*   < > 25   CL 96   < > 100   BUN 19   < > 12   CREATININE 1.4   < > 1.1   ALKPHOS 90  --   --    ALT 21  --   --    AST 23  --   --    BILITOT 0.7  --   --     < > = values in this interval not displayed.     CRP:   Recent Labs   Lab 12/12/24 2112   .2*     ESR:   Recent Labs   Lab 12/12/24 2112   SEDRATE 83*     LFTs:   Recent Labs   Lab 12/12/24  1609   ALT 21   AST 23   ALKPHOS 90   BILITOT 0.7   PROT 7.5   ALBUMIN 2.6*     Wound Cultures:   Recent Labs   Lab 09/12/24  1239 12/13/24  1012   LABAERO STAPHYLOCOCCUS AUREUS  Many  *  SERRATIA MARCESCENS   Few  *  CITROBACTER FREUNDII  Rare  Skin josselin also present  * STAPHYLOCOCCUS AUREUS  Moderate  Susceptibility pending  *     Microbiology Results (last 7 days)       Procedure Component Value Units Date/Time    Blood culture [[id number]] Collected: 12/15/24 1213    Order Status: Sent Specimen: Blood Updated: 12/15/24 1224    Aerobic culture [[id number]]  (Abnormal) Collected: 12/13/24 1012    Order Status: Completed Specimen: Bone from Foot, Right Updated: 12/15/24 1147     Aerobic Bacterial Culture STAPHYLOCOCCUS AUREUS  Moderate  Susceptibility pending      Blood culture [[id number]] Collected: 12/15/24 1130    Order Status: Sent Specimen: Blood Updated: 12/15/24 1145    MRSA/SA Rapid ID by PCR from Blood culture [[id number]]  (Abnormal) Collected: 12/13/24 0125    Order Status: Completed Updated: 12/15/24 0921     Staph aureus ID by PCR Positive     Methicillin Resistant ID by PCR Negative    Blood culture [[id number]] Collected: 12/13/24 0125     Order Status: Completed Specimen: Blood from Peripheral, Hand, Right Updated: 12/15/24 0718     Blood Culture, Routine Gram stain aer bottle: Gram positive cocci in clusters resembling Staph      Results called to and read back by: Adeline Ferreira RN 12/15/2024      07:17    Blood culture [[id number]] Collected: 12/13/24 0100    Order Status: Completed Specimen: Blood from Peripheral, Forearm, Left Updated: 12/15/24 0613     Blood Culture, Routine No Growth to date      No Growth to date      No Growth to date    Culture, Anaerobe [[id number]] Collected: 12/13/24 1012    Order Status: Completed Specimen: Bone from Foot, Right Updated: 12/14/24 1416     Anaerobic Culture Culture in progress    Gram stain [[id number]] Collected: 12/13/24 1012    Order Status: Completed Specimen: Bone from Foot, Right Updated: 12/13/24 1417     Gram Stain Result No WBC's      No organisms seen    Blood culture [[id number]]     Order Status: Canceled Specimen: Blood     Blood culture [[id number]]     Order Status: Canceled Specimen: Blood     Influenza A & B by Molecular [[id number]] Collected: 12/12/24 1612    Order Status: Completed Specimen: Nasopharyngeal Swab Updated: 12/12/24 1730     Influenza A, Molecular Negative     Influenza B, Molecular Negative     Flu A & B Source Nasal swab            Recent Labs   Lab 12/12/24  1650   COLORU Yellow   SPECGRAV 1.030   PHUR 6.0   PROTEINUA 2+*   BACTERIA Occasional   NITRITE Negative   LEUKOCYTESUR Negative   HYALINECASTS 26*     All pertinent labs reviewed within the last 24 hours.    Diagnostic Results:  I have reviewed all pertinent imaging results/findings within the past 24 hours.

## 2024-12-15 NOTE — PLAN OF CARE
Problem: Adult Inpatient Plan of Care  Goal: Plan of Care Review  Outcome: Progressing  Goal: Patient-Specific Goal (Individualized)  Outcome: Progressing  Goal: Absence of Hospital-Acquired Illness or Injury  Outcome: Progressing  Goal: Optimal Comfort and Wellbeing  Outcome: Progressing  Goal: Readiness for Transition of Care  Outcome: Progressing     Problem: Infection  Goal: Absence of Infection Signs and Symptoms  Outcome: Progressing     Problem: Diabetes Comorbidity  Goal: Blood Glucose Level Within Targeted Range  Outcome: Progressing     Problem: Wound  Goal: Optimal Coping  Outcome: Progressing  Goal: Optimal Functional Ability  Outcome: Progressing  Goal: Absence of Infection Signs and Symptoms  Outcome: Progressing  Goal: Improved Oral Intake  Outcome: Progressing  Goal: Optimal Pain Control and Function  Outcome: Progressing  Goal: Skin Health and Integrity  Outcome: Progressing  Goal: Optimal Wound Healing  Outcome: Progressing     Problem: Fall Injury Risk  Goal: Absence of Fall and Fall-Related Injury  Outcome: Progressing     Plan of care and expectation from staff discussed with patient. Patient out of bed most of day in chair. Denies pain, numbness/tingling to BLE. Dressing c/d/I to foot. CBG monitored today. Scheduled and PRN SS insulin given. Repeat blood cultures obtained today. IV abx adjusted this afternoon.

## 2024-12-15 NOTE — SUBJECTIVE & OBJECTIVE
Interval History:   No acute events overnight  Afebrile and white blood cell count normal  Blood cultures now positive for Staph aureus with BC ID indicating MSSA  Bone culture right foot with staph aureus  Patient reports feeling much better  Denies fever, chills, sweating    Review of Systems   Constitutional:  Negative for chills, diaphoresis and fever.   Respiratory:  Negative for shortness of breath.    Cardiovascular:  Negative for chest pain.   Gastrointestinal:  Negative for abdominal pain, diarrhea, nausea and vomiting.   Genitourinary:  Negative for dysuria and hematuria.   Skin:  Positive for color change and wound.     Objective:     Vital Signs (Most Recent):  Temp: 98 °F (36.7 °C) (12/15/24 1531)  Pulse: (!) 57 (12/15/24 1531)  Resp: 18 (12/15/24 1531)  BP: (!) 143/74 (12/15/24 1531)  SpO2: 96 % (12/15/24 1531) Vital Signs (24h Range):  Temp:  [97.5 °F (36.4 °C)-98.7 °F (37.1 °C)] 98 °F (36.7 °C)  Pulse:  [48-62] 57  Resp:  [16-18] 18  SpO2:  [93 %-98 %] 96 %  BP: (102-156)/(52-81) 143/74     Weight: 126.8 kg (279 lb 9.6 oz)  Body mass index is 35.9 kg/m².    Estimated Creatinine Clearance: 101 mL/min (based on SCr of 1.1 mg/dL).     Physical Exam  Constitutional:       General: He is not in acute distress.     Appearance: Normal appearance. He is well-developed. He is not ill-appearing, toxic-appearing or diaphoretic.       HENT:      Head: Normocephalic and atraumatic.   Cardiovascular:      Rate and Rhythm: Normal rate and regular rhythm.      Heart sounds: Normal heart sounds. No murmur heard.     No friction rub. No gallop.   Pulmonary:      Effort: Pulmonary effort is normal. No respiratory distress.      Breath sounds: Normal breath sounds. No wheezing or rales.   Abdominal:      General: Bowel sounds are normal. There is no distension.      Palpations: Abdomen is soft. There is no mass.      Tenderness: There is no abdominal tenderness. There is no guarding or rebound.   Skin:     General: Skin  is warm and dry.   Neurological:      Mental Status: He is alert and oriented to person, place, and time.   Psychiatric:         Behavior: Behavior normal.          Significant Labs: Blood Culture:   Recent Labs   Lab 12/13/24  0100 12/13/24  0125   LABBLOO No Growth to date  No Growth to date  No Growth to date Gram stain aer bottle: Gram positive cocci in clusters resembling Staph  Results called to and read back by: Hodan Godwin, Charge RN 12/15/2024  07:17     CBC:   Recent Labs   Lab 12/14/24  0405 12/15/24  0329   WBC 6.18 5.87   HGB 13.2* 11.7*   HCT 40.8 34.3*    244     CMP:   Recent Labs   Lab 12/14/24  0405 12/15/24  0329   * 133*   K 4.0 3.6   CL 98 100   CO2 25 25   * 175*   BUN 13 12   CREATININE 1.3 1.1   CALCIUM 8.4* 8.2*   ANIONGAP 10 8     Wound Culture:   Recent Labs   Lab 09/12/24  1239 12/13/24  1012   LABAERO STAPHYLOCOCCUS AUREUS  Many  *  SERRATIA MARCESCENS   Few  *  CITROBACTER FREUNDII  Rare  Skin josselin also present  * STAPHYLOCOCCUS AUREUS  Moderate  Susceptibility pending  *     All pertinent labs within the past 24 hours have been reviewed.    Significant Imaging: I have reviewed all pertinent imaging results/findings within the past 24 hours.  MRI Forefoot WO Contrast RT [[id number]] Resulted: 12/13/24 0727   Order Status: Completed Updated: 12/13/24 0730   Narrative:     EXAMINATION:  MRI FOREFOOT WO CONTRAST RT    CLINICAL HISTORY:  Osteomyelitis, foot;    TECHNIQUE:  Noncontrast MRI of the right forefoot.    COMPARISON:  X-ray 12/12/2024    FINDINGS:  Bones and joints: Operative changes of 2nd metatarsal head resection, 3rd digit resection and 5th digit resection at the level of the metatarsal shaft.  Soft tissue skin defect over the dorsal aspect of the foot at the level of approximately the 2nd metatarsal stump.  Abnormal T2 hyperintensity is seen within the bone of the 3rd metatarsal without definitive T1 hypointensity.  Fluid signal surrounds the  periosteum of the 2nd metatarsal shaft as well as well-defined linear T2 hyperintensity within the medullary cavity of the metatarsal shaft.  Arthritic changes of the midfoot.    Soft tissues: Diffuse soft tissue edema is present and there is muscular edema and mild fatty atrophy in keeping with chronic denervation.   Impression:       Cellulitis of the forefoot with postoperative changes as described.    High likelihood of osteomyelitis of the 3rd metatarsal shaft.    Periostitis and intramedullary signal abnormality of the 2nd metatarsal shaft, in this patient with relatively recent osteotomy and soft tissue defect extending to the medullary cavity also representing high likelihood of osteomyelitis.  Differential diagnosis includes a longitudinal stress fracture.      Electronically signed by: Edgar Serrano Jr  Date: 12/13/2024  Time: 07:27   X-Ray Foot Complete Right [[id number]] Resulted: 12/12/24 2303   Order Status: Completed Updated: 12/12/24 2305   Narrative:     EXAMINATION:  XR FOOT COMPLETE 3 VIEW RIGHT    CLINICAL HISTORY:  . Osteomyelitis, unspecified    TECHNIQUE:  AP, lateral, and oblique views of the right foot were performed.    COMPARISON:  12/06/2024.    FINDINGS:  There are postop changes of 2nd, 3rd, and 5th toe amputations, with resection of the 2nd toe metatarsal head and neck, resection at the 3rd toe metatarsophalangeal joint, and resection at the 5th toe distal phalangeal shaft.  There is no radiographic evidence of acute osteomyelitis.  There is soft tissue gas overlying the 2nd toe metatarsal phalangeal joint region.  There is soft tissue edema of the 2nd toe.  Remaining osseous structures are intact.  There are dorsal osteophytes.  There is soft tissue edema of the dorsum of the foot.  There are vascular calcifications.   Impression:       No radiographic evidence of acute osteomyelitis.  Soft tissue gas of the dorsal aspect of the 2nd toe as above, may be postoperative.  Infection  with gas-forming organism not excluded.      Electronically signed by: Pasha Hermosillo  Date: 12/12/2024  Time: 23:03   X-Ray Chest AP Portable [[id number]] Resulted: 12/12/24 1928   Order Status: Completed Updated: 12/12/24 1931   Narrative:     EXAMINATION:  XR CHEST AP PORTABLE    CLINICAL HISTORY:  Cough, unspecified    TECHNIQUE:  Single frontal view of the chest was performed.    COMPARISON:  Chest radiograph 11/28/2022, chest CT 03/31/2017    FINDINGS:  Patient is slightly rotated.  Resolution is limited by body habitus with underpenetration.    No detrimental change.  Cardiomediastinal silhouette is midline and within normal limits for age, stable.  Bibasilar minimal platelike scarring versus atelectasis.  Pulmonary vasculature and hilar contours are within normal limits.  The lungs are otherwise normal to slightly hypoexpanded without consolidation, pleural effusion or pneumothorax identified.  No acute osseous process seen.  PA and lateral views can be obtained.   Impression:       No radiographic evidence of pneumonia or other source of cough/shortness of breath, noting that early/mild viral pneumonia or nonspecific bronchitis may be radiographically occult.      Electronically signed by: Simon Reyes MD  Date: 12/12/2024  Time: 19:28      Imaging History     2024    Date Procedure Name Study Review Link PACS Link Status Accession Number Location   12/14/24 03:04 AM Cardiac monitoring strips Study Review  Final     12/13/24 04:04 AM Cardiac monitoring strips Study Review  Final     12/13/24 03:26 AM MRI Forefoot WO Contrast RT Study Review  Images Final [id number] [facility]   12/12/24 09:22 PM X-Ray Foot Complete Right Study Review  Images Final [id number] [facility]   12/12/24 05:38 PM X-Ray Chest AP Portable Study Review  Images Final [id number] [facility]   12/06/24 08:47 AM X-Ray Foot Complete Right Study Review  Images Final [id number] [facility]

## 2024-12-15 NOTE — PROGRESS NOTES
[facility]urg  Podiatry  Progress Note    Patient Name: Billy Sheffield Miguel  MRN: [id number]  Admission Date: 12/12/2024  Hospital Length of Stay: 3 days  Attending Physician: Perico Florentino MD  Primary Care Provider: Augie Ruiz MD     Subjective:     Interval History: Patient seated in bedside chair upon my arrival. States that his appetite is back and he is continuing to feel much better. He denies N/V/F/C and any pain to his foot.        Scheduled Meds:   amLODIPine  10 mg Oral Daily    atorvastatin  80 mg Oral QHS    carvediloL  25 mg Oral BID    ciprofloxacin  400 mg Intravenous Q12H    enoxparin  40 mg Subcutaneous Daily    insulin aspart U-100  10 Units Subcutaneous TIDWM    insulin glargine U-100  30 Units Subcutaneous BID    vancomycin (VANCOCIN) IV (PEDS and ADULTS)  2,000 mg Intravenous Q12H     Continuous Infusions:  PRN Meds:  Current Facility-Administered Medications:     acetaminophen, 650 mg, Oral, Q4H PRN    albuterol-ipratropium, 3 mL, Nebulization, Q4H PRN    bisacodyL, 10 mg, Rectal, Daily PRN    dextrose 10%, 12.5 g, Intravenous, PRN    dextrose 10%, 25 g, Intravenous, PRN    diphenhydrAMINE, 25 mg, Intravenous, Q12H PRN    glucagon (human recombinant), 1 mg, Intramuscular, PRN    glucose, 16 g, Oral, PRN    glucose, 24 g, Oral, PRN    insulin aspart U-100, 0-5 Units, Subcutaneous, QID (AC + HS) PRN    melatonin, 6 mg, Oral, Nightly PRN    polyethylene glycol, 17 g, Oral, Daily PRN    Pharmacy to dose Vancomycin consult, , , Once **AND** vancomycin - pharmacy to dose, , Intravenous, pharmacy to manage frequency    Review of Systems   Constitutional:  Negative for activity change, appetite change, chills, fatigue and fever.   Respiratory:  Negative for shortness of breath.    Cardiovascular:  Negative for leg swelling.   Gastrointestinal:  Negative for nausea and vomiting.   Musculoskeletal:  Negative for joint swelling.   Skin:  Positive for wound. Negative for color change.    Neurological:  Positive for numbness.     Objective:     Vital Signs (Most Recent):  Temp: 98 °F (36.7 °C) (12/15/24 1155)  Pulse: (!) 48 (12/15/24 1155)  Resp: 18 (12/15/24 1155)  BP: 120/75 (12/15/24 1155)  SpO2: 97 % (12/15/24 1155) Vital Signs (24h Range):  Temp:  [97.5 °F (36.4 °C)-98.7 °F (37.1 °C)] 98 °F (36.7 °C)  Pulse:  [48-62] 48  Resp:  [16-18] 18  SpO2:  [93 %-98 %] 97 %  BP: (102-156)/(52-81) 120/75     Weight: 126.8 kg (279 lb 9.6 oz)  Body mass index is 35.9 kg/m².    Foot Exam    General  Orientation: alert and oriented to person, place, and time       Right Foot/Ankle     Inspection and Palpation  Tenderness: none   Swelling: (3rd toe)  Skin Exam: callus, drainage, dry skin, skin changes, abnormal color and ulcer; skin not intact, no cellulitis and no erythema     Neurovascular  Dorsalis pedis: 1+  Posterior tibial: doppler  Saphenous nerve sensation: absent  Tibial nerve sensation: absent  Superficial peroneal nerve sensation: absent  Deep peroneal nerve sensation: absent  Sural nerve sensation: absent    Comments  Partial 3rd and 5th ray amputated. Partial 2nd metatarsal amputated with digit left intact. Incision site at dorsal 2nd metatarsal head noted to contain wound with fibrous base, continued copious purulence and slough present upon removing the dressing and upon removal of the packing. Wound probes to bone and is Non tender to palpation.  Negligible malodor.      Left Foot/Ankle      Inspection and Palpation  Tenderness: none   Swelling: none   Skin Exam: callus and dry skin; no drainage, no cellulitis, no ulcer and no erythema     Neurovascular  Dorsalis pedis: 1+  Posterior tibial: doppler  Saphenous nerve sensation: absent  Tibial nerve sensation: absent  Superficial peroneal nerve sensation: absent  Deep peroneal nerve sensation: absent  Sural nerve sensation: absent    Comments  Partial 1st ray amputated, stable and healed.  Scab located at dorsal 3rd digit, plantar right foot, as  well as erythema on anterior leg; otherwise no other discernible wounds in left lower extremity              Laboratory:  A1C:   Recent Labs   Lab 11/27/24  0739   HGBA1C 10.2*     BMP:   Recent Labs   Lab 12/15/24  0329   *   *   K 3.6      CO2 25   BUN 12   CREATININE 1.1   CALCIUM 8.2*   MG 1.7     CBC:   Recent Labs   Lab 12/15/24  0329   WBC 5.87   RBC 4.04*   HGB 11.7*   HCT 34.3*      MCV 85   MCH 29.0   MCHC 34.1     CMP:   Recent Labs   Lab 12/12/24  1609 12/13/24  0603 12/15/24  0329   *   < > 175*   CALCIUM 8.6*   < > 8.2*   ALBUMIN 2.6*  --   --    PROT 7.5  --   --    *   < > 133*   K 4.9   < > 3.6   CO2 22*   < > 25   CL 96   < > 100   BUN 19   < > 12   CREATININE 1.4   < > 1.1   ALKPHOS 90  --   --    ALT 21  --   --    AST 23  --   --    BILITOT 0.7  --   --     < > = values in this interval not displayed.     CRP:   Recent Labs   Lab 12/12/24 2112   .2*     ESR:   Recent Labs   Lab 12/12/24 2112   SEDRATE 83*     LFTs:   Recent Labs   Lab 12/12/24  1609   ALT 21   AST 23   ALKPHOS 90   BILITOT 0.7   PROT 7.5   ALBUMIN 2.6*     Wound Cultures:   Recent Labs   Lab 09/12/24  1239 12/13/24  1012   LABAERO STAPHYLOCOCCUS AUREUS  Many  *  SERRATIA MARCESCENS   Few  *  CITROBACTER FREUNDII  Rare  Skin josselin also present  * STAPHYLOCOCCUS AUREUS  Moderate  Susceptibility pending  *     Microbiology Results (last 7 days)       Procedure Component Value Units Date/Time    Blood culture [[id number]] Collected: 12/15/24 1213    Order Status: Sent Specimen: Blood Updated: 12/15/24 1224    Aerobic culture [[id number]]  (Abnormal) Collected: 12/13/24 1012    Order Status: Completed Specimen: Bone from Foot, Right Updated: 12/15/24 1147     Aerobic Bacterial Culture STAPHYLOCOCCUS AUREUS  Moderate  Susceptibility pending      Blood culture [[id number]] Collected: 12/15/24 1130    Order Status: Sent Specimen: Blood Updated: 12/15/24 114    MRSA/SA Rapid ID by PCR  from Blood culture [[id number]]  (Abnormal) Collected: 12/13/24 0125    Order Status: Completed Updated: 12/15/24 0921     Staph aureus ID by PCR Positive     Methicillin Resistant ID by PCR Negative    Blood culture [[id number]] Collected: 12/13/24 0125    Order Status: Completed Specimen: Blood from Peripheral, Hand, Right Updated: 12/15/24 0718     Blood Culture, Routine Gram stain aer bottle: Gram positive cocci in clusters resembling Staph      Results called to and read back by: Adeline Ferreira RN 12/15/2024      07:17    Blood culture [[id number]] Collected: 12/13/24 0100    Order Status: Completed Specimen: Blood from Peripheral, Forearm, Left Updated: 12/15/24 0613     Blood Culture, Routine No Growth to date      No Growth to date      No Growth to date    Culture, Anaerobe [[id number]] Collected: 12/13/24 1012    Order Status: Completed Specimen: Bone from Foot, Right Updated: 12/14/24 1416     Anaerobic Culture Culture in progress    Gram stain [[id number]] Collected: 12/13/24 1012    Order Status: Completed Specimen: Bone from Foot, Right Updated: 12/13/24 1417     Gram Stain Result No WBC's      No organisms seen    Blood culture [[id number]]     Order Status: Canceled Specimen: Blood     Blood culture [[id number]]     Order Status: Canceled Specimen: Blood     Influenza A & B by Molecular [[id number]] Collected: 12/12/24 1612    Order Status: Completed Specimen: Nasopharyngeal Swab Updated: 12/12/24 1730     Influenza A, Molecular Negative     Influenza B, Molecular Negative     Flu A & B Source Nasal swab            Recent Labs   Lab 12/12/24  1650   COLORU Yellow   SPECGRAV 1.030   PHUR 6.0   PROTEINUA 2+*   BACTERIA Occasional   NITRITE Negative   LEUKOCYTESUR Negative   HYALINECASTS 26*     All pertinent labs reviewed within the last 24 hours.    Diagnostic Results:  I have reviewed all pertinent imaging results/findings within the past 24 hours.  Assessment/Plan:     Endocrine  * Diabetic  foot ulcer associated with diabetes mellitus due to underlying condition  59-year-old male with PMH CAD, DM2, HLD, HTN, prior pedal amputations presents to ED 12/12/24 for generalized weakness, chills, nausea.  Patient has had recent history with Dr. Mingo Melara DPM for right foot 2nd metatarsal head excision DOS 12/6/24. Podiatry consulted for management of aforementioned problem.     Assessment: Post-surgical infection of right foot incision in setting of DM. Per exam, probe to bone s/p debridement of ulcer. XR of right foot was obtained showing amputation changes of 2nd and 5th rays at the level of metatarsal shafts; no osseous infiltration of infection observed.  MRI of right foot obtained showing OM of the 3rd metatarsal shaft, periostitis versus early OM like changes to the 2nd metatarsal shaft.     Plan:  Dressing and packing removed and foot flushed with Vashe. Wound repacked with half-inch plain packing then dressed with drain sponges, ABD pad, Kerlix, and ACE bandage.  Bone biopsy pending currently speciation staph aureus susceptibility is pending.  Antibiotics per ID   Wound care orders in place  Podiatry will monitor progression. No surgical acute intervention warranted at this time.  Podiatry will follow        Rogerio Ross MD  Podiatry  [facility]

## 2024-12-15 NOTE — PROGRESS NOTES
Hospital Medicine  Progress note    [place] [facility] [place] Perico Florentino MD  Admit Date: 12/12/2024    Principal Problem:  Diabetic foot ulcer associated with diabetes mellitus due to underlying condition    HPI:Billy Rivera is a 59 y.o. male with a PMHx of T2DM, CAD, HTN, HLD, and right foot wound who [place] [facility] [place] infection. Patient had a chronic right foot wound for which he follows with podiatry. He had surgery intervention on his R metatarsal on 12/5. He was prescribed PO clinda and cipro prophylactially following the surgery. He reports felling generally unwell with chills, decreased appetite,generalized weakness since Tueaday. Reports falling earlier today due to generalized weakness, but was able to catch himself with his arms. No head trauma, neck/back pain or LOC. His R foot has been wrapped since the surgery so he hasn't look at his surgical incision until bandage was taken off in the ED. He has purulent drainage from incision site. Denies any LE wound, chest pain, SOB, N/V, abdominal pain or syncope.       Interval hx:    Feeling well, in the chair.     No new sx.   Evaluation per Podiatry noted.     Bone Cx. Appears to be MSSA.  BCX 12/13 1/2 GPC in clusters - staph. (Contaminant?)  BCX 12/15 - pending.     Physical Exam:    Vital Signs (Most Recent)  Temp: 97.9 °F (36.6 °C) (12/15/24 0818)  Pulse: 60 (12/15/24 0818)  Resp: 18 (12/15/24 0818)  BP: (!) 153/81 (12/15/24 0818)  SpO2: 98 % (12/15/24 0818)  No intake or output data in the 24 hours ending 12/15/24 1040    Physical Exam  Constitutional:       General: He is not in acute distress.     Appearance: He is obese.   HENT:      Mouth/Throat:      Mouth: Mucous membranes are moist.   Cardiovascular:      Rate and Rhythm: Normal rate and regular rhythm.      Heart sounds: No murmur heard.  Pulmonary:      Effort: Pulmonary effort is normal. No respiratory distress.   Abdominal:      General: Abdomen is flat. There  is no distension.      Tenderness: There is no abdominal tenderness.   Musculoskeletal:      Comments: Right food in bandage, clean and dry. Erythema noted lower 1/2 of right lower leg - poss. Cellulitis.   No edema.    Neurological:      General: No focal deficit present.      Mental Status: He is alert.   Psychiatric:         Mood and Affect: Mood normal.             Recent Labs   Lab 12/13/24  0603 12/14/24  0405 12/15/24  0329   WBC 7.00 6.18 5.87   HGB 13.4* 13.2* 11.7*   HCT 39.0* 40.8 34.3*    280 244     Recent Labs   Lab 12/13/24  0603 12/14/24  0405 12/15/24  0329   * 133* 133*   K 4.2 4.0 3.6   CL 97 98 100   CO2 22* 25 25   BUN 19 13 12   CREATININE 1.2 1.3 1.1   * 267* 175*   CALCIUM 8.2* 8.4* 8.2*   MG 1.5* 1.8 1.7   PHOS 2.6* 3.5 4.1     Recent Labs   Lab 12/12/24  1609   ALKPHOS 90   ALT 21   AST 23   ALBUMIN 2.6*   PROT 7.5   BILITOT 0.7        Recent Labs   Lab 12/14/24  0731 12/14/24  1105 12/14/24  1544 12/14/24  1910 12/14/24  2105 12/15/24  0817   POCTGLUCOSE 251* 328* 313* 308* 251* 175*       Scheduled Meds:   amLODIPine  10 mg Oral Daily    atorvastatin  80 mg Oral QHS    carvediloL  25 mg Oral BID    ciprofloxacin  400 mg Intravenous Q12H    enoxparin  40 mg Subcutaneous Daily    insulin aspart U-100  10 Units Subcutaneous TIDWM    insulin glargine U-100  30 Units Subcutaneous BID    vancomycin (VANCOCIN) IV (PEDS and ADULTS)  2,000 mg Intravenous Q12H     Continuous Infusions:      As Needed:    Current Facility-Administered Medications:     acetaminophen, 650 mg, Oral, Q4H PRN    albuterol-ipratropium, 3 mL, Nebulization, Q4H PRN    bisacodyL, 10 mg, Rectal, Daily PRN    dextrose 10%, 12.5 g, Intravenous, PRN    dextrose 10%, 25 g, Intravenous, PRN    diphenhydrAMINE, 25 mg, Intravenous, Q12H PRN    glucagon (human recombinant), 1 mg, Intramuscular, PRN    glucose, 16 g, Oral, PRN    glucose, 24 g, Oral, PRN    insulin aspart U-100, 0-5 Units, Subcutaneous, QID (AC +  HS) PRN    melatonin, 6 mg, Oral, Nightly PRN    polyethylene glycol, 17 g, Oral, Daily PRN    Pharmacy to dose Vancomycin consult, , , Once **AND** vancomycin - pharmacy to dose, , Intravenous, pharmacy to manage frequency    Assessment and Plan  / Problems managed today    * Diabetic foot ulcer associated with diabetes mellitus due to underlying condition  - afebrile without leukocytosis  - inflammatory markers elevated  - XR, MRI confirms osteomyelitis of metatarsal 2 and 3  - on  IV vanc and cipro  - 12/13 - podiatry debrided and took bone Bx. - Staph Aureus - MSSA  - ID consult appreciated  - ABX adjustments per ID    Subacute osteomyelitis of right foot  See Diabetic food infection      Type 2 diabetes mellitus 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, with long-term current use of insulin  - overall poor control  - home regimen: insulin 70/30 40U in AM, 30U in PM    - Rx. Adjusted to 12/14  - some improvement this AM - will monitor and adjust further if needed.   - Lantus 30 units BID  - Aspart 10 units with meals.   - LDSSI, ACHS accuchecks  Lab Results   Component Value Date    HGBA1C 10.2 (H) 11/27/2024       JOVANNI (acute kidney injury)  JOVANNI is likely due to pre-renal azotemia due to intravascular volume depletion. Baseline creatinine is  1.2 . Most recent creatinine and eGFR are listed below.  Recent Labs     12/13/24  0603 12/14/24  0405 12/15/24  0329   CREATININE 1.2 1.3 1.1   EGFRNORACEVR >60.0 >60.0 >60.0      Plan  - JOVANNI is improving  - Avoid nephrotoxins and renally dose meds for GFR listed above  - Monitor urine output, serial BMP, and adjust therapy as needed  - holding ACE  - improved    Obesity  Body mass index is 35.9 kg/m². Morbid obesity complicates all aspects of disease management from diagnostic modalities to treatment.    Hx of colon cancer, stage I  - noted hx    Hyponatremia  Hyponatremia is likely due to Dehydration/hypovolemia and  Hyperglycemia - corrected ca. 133. The patient's most recent sodium results are listed below.  Recent Labs     12/13/24  0603 12/14/24  0405 12/15/24  0329   * 133* 133*     Plan  - Correct the sodium by 4-6mEq in 24 hours.   - Obtain the following studies: serial labs.  - Will treat the hyponatremia with IV fluids as follows: 100 cc/hr  - Monitor sodium Daily.   - Patient hyponatremia is improving    Essential hypertension  - on Amlodipine 10, Carvediolol 12.5 BID and Lisinopril 40 mg daily  - BP currently controlled  - will resume as feasible, currently holding ACE due to JOVANNI.         Discharge Planning   DARVIN: 12/16/2024     Code Status: Full Code   Is the patient medically ready for discharge?:     Reason for patient still in hospital (select all that apply): Patient trending condition  Discharge Plan A: Home with family          Perico Florentino MD  Sr. Staff Physician  [facility]

## 2024-12-15 NOTE — ASSESSMENT & PLAN NOTE
59-year-old male with PMH CAD, DM2, HLD, HTN, prior pedal amputations presents to ED 12/12/24 for generalized weakness, chills, nausea.  Patient has had recent history with Dr. Mingo Melara DPM for right foot 2nd metatarsal head excision DOS 12/6/24. Podiatry consulted for management of aforementioned problem.     Assessment: Post-surgical infection of right foot incision in setting of DM. Per exam, probe to bone s/p debridement of ulcer. XR of right foot was obtained showing amputation changes of 2nd and 5th rays at the level of metatarsal shafts; no osseous infiltration of infection observed.  MRI of right foot obtained showing OM of the 3rd metatarsal shaft, periostitis versus early OM like changes to the 2nd metatarsal shaft.     Plan:  Dressing and packing removed and foot flushed with Vashe. Wound repacked with half-inch plain packing then dressed with drain sponges, ABD pad, Kerlix, and ACE bandage.  Bone biopsy pending currently speciation staph aureus susceptibility is pending.  Antibiotics per ID   Wound care orders in place  Podiatry will monitor progression. No surgical acute intervention warranted at this time.  Podiatry will follow

## 2024-12-15 NOTE — PROGRESS NOTES
[facility] - Med Surg  Infectious Disease  Progress Note    Patient Name: Billy Sheffield Miguel  MRN: [id number]  Admission Date: 12/12/2024  Length of Stay: 3 days  Attending Physician: Perico Florentino MD  Primary Care Provider: Augie Ruiz MD    Isolation Status: No active isolations  Assessment/Plan:      Endocrine  * Diabetic foot ulcer associated with diabetes mellitus due to underlying condition  I have reviewed hospital notes from   service and other specialty providers. I have also reviewed CBC, CMP/BMP,  cultures and imaging with my interpretation as documented.      59 year old male with insulin dependent diabetes, CAD, HLD, with history of prior L foot osteomyelitis s/p left foot hallux amputation, partial left 2nd toe amputation, and right 3rd and 5th toe amputation presented to ER with R foot infection after recent 2nd metatarsal head resection on 12/5/24 with podiatry. Sent home with PO Clinda and Cipro. Started to have weakness, chills, and nausea 5 days after surgery. Presented to ER 12/13 and found to have purulence from recent incision site. Recently treated for infection of R foot with prolonged course of Levo and Doxy (end of care in November)    On admit, afebrile, otherwise VSS. No leukocytosis. Elevated .2 and ESR 83.  MRI R forefoot with cellulitis and high likelihood of osteomyelitis of 2nd metatarsal shaft and 3rd metatarsal shaft. Started on Vancomycin and Cipro in ER which he remains on.  Podiatry following and notes wound probes to bone.  Bone biopsy done showing staph aureus.Blood cultures ordered 12/13 show staph aureus with BC ID indicating MSSA.  Patient clinically improved.    Recommendations / Plan:  DC IV Vanc and Cipro,  Start Ancef given MSSA on blood culture as well as likely we will be in foot bone culture  Follow up podiatry plans  Repeat and follow up blood cultures  2D echo    -- Discussed with ID staff and primary team   -- ID will continue to follow w/  further recs.        Anticipated Disposition: tbd    Thank you for your consult. I will follow-up with patient. Please contact us if you have any additional questions.    DARVIN Steele  Infectious Disease  [facility] - Med Surg    Subjective:     Principal Problem:Diabetic foot ulcer associated with diabetes mellitus due to underlying condition    HPI: 59 year old male with insulin dependent diabetes, CAD, HLD, with history of prior L foot osteomyelitis s/p left foot hallux amputation, partial left 2nd toe amputation, and right 3rd and 5th toe amputation presented to ER with R foot infection after recent amputation on 12/5/24 with podiatry.    In 9/2024 R foot with new issue ulceration/blister, podiatry was following weekly for debridement and wound care. Cultures 09/12/2024 growing Staph aureus, Serratia marcescens , Citrobacter freundii, and presumptive prevotella/porphyromonas group. Treated with 6 weeks of PO Doxy and Levo. Underwent 2nd metatarsal head resection of right foot on 12/5/24 with podiatry, Dr. Melara. Treated with PO Clindamycin and Cipro following surgery. Presented to ER 12/13 for 4 days of generalized weakness, chills, nausea, cough, decreased appetite, and nasal congestion. Reported fall on 12/10/24 with no head injury or LOC. No trauma to foot. His R foot has been wrapped since surgery, but was unwrapped in ER with redness and purulent drainage from incision site.    On admit, afebrile, otherwise VSS. No leukocytosis. Elevated .2 and ESR 83. Blood cultures 12/13 no growth. MRI R forefoot with cellulitis and high likelihood of osteomyelitis of 2nd metatarsal shaft and 3rd metatarsal shaft. Started on IV Vancomycin and IV Cipro in ER. Podiatry and ID consulted.     Patient reports his foot was not bothering him since surgery, it was mainly his other symptoms of cough, congestion, weakness, and chills. Notes upper respiratory symptoms are improving. No fevers.     History of R foot  with MSSA and pasturella in 01/2023, treated with 6 weeks of ceftriaxone. Bone culture of L 5th digit with MSSA (01/2024) treated with 6 weeks of Doxycycline, and he saw ID at that time. Hx of L foot culture (04/2024) growing Staph aureus, Acinetobacter baumannii/haemolyticus, Bacteroidies thetaiotaomicron, bacteroides stercoris, and presumptive prevotella/porphyromonas group. Hx of L foot culture (06/2024) growing Enterococcusus faecalis, staph aureus, and Prevotella bivia.   Interval History:   No acute events overnight  Afebrile and white blood cell count normal  Blood cultures now positive for Staph aureus with BC ID indicating MSSA  Bone culture right foot with staph aureus  Patient reports feeling much better  Denies fever, chills, sweating    Review of Systems   Constitutional:  Negative for chills, diaphoresis and fever.   Respiratory:  Negative for shortness of breath.    Cardiovascular:  Negative for chest pain.   Gastrointestinal:  Negative for abdominal pain, diarrhea, nausea and vomiting.   Genitourinary:  Negative for dysuria and hematuria.   Skin:  Positive for color change and wound.     Objective:     Vital Signs (Most Recent):  Temp: 98 °F (36.7 °C) (12/15/24 1531)  Pulse: (!) 57 (12/15/24 1531)  Resp: 18 (12/15/24 1531)  BP: (!) 143/74 (12/15/24 1531)  SpO2: 96 % (12/15/24 1531) Vital Signs (24h Range):  Temp:  [97.5 °F (36.4 °C)-98.7 °F (37.1 °C)] 98 °F (36.7 °C)  Pulse:  [48-62] 57  Resp:  [16-18] 18  SpO2:  [93 %-98 %] 96 %  BP: (102-156)/(52-81) 143/74     Weight: 126.8 kg (279 lb 9.6 oz)  Body mass index is 35.9 kg/m².    Estimated Creatinine Clearance: 101 mL/min (based on SCr of 1.1 mg/dL).     Physical Exam  Constitutional:       General: He is not in acute distress.     Appearance: Normal appearance. He is well-developed. He is not ill-appearing, toxic-appearing or diaphoretic.       HENT:      Head: Normocephalic and atraumatic.   Cardiovascular:      Rate and Rhythm: Normal rate and  regular rhythm.      Heart sounds: Normal heart sounds. No murmur heard.     No friction rub. No gallop.   Pulmonary:      Effort: Pulmonary effort is normal. No respiratory distress.      Breath sounds: Normal breath sounds. No wheezing or rales.   Abdominal:      General: Bowel sounds are normal. There is no distension.      Palpations: Abdomen is soft. There is no mass.      Tenderness: There is no abdominal tenderness. There is no guarding or rebound.   Skin:     General: Skin is warm and dry.   Neurological:      Mental Status: He is alert and oriented to person, place, and time.   Psychiatric:         Behavior: Behavior normal.          Significant Labs: Blood Culture:   Recent Labs   Lab 12/13/24  0100 12/13/24  0125   LABBLOO No Growth to date  No Growth to date  No Growth to date Gram stain aer bottle: Gram positive cocci in clusters resembling Staph  Results called to and read back by: Hodan Godwin, Charge RN 12/15/2024  07:17     CBC:   Recent Labs   Lab 12/14/24  0405 12/15/24  0329   WBC 6.18 5.87   HGB 13.2* 11.7*   HCT 40.8 34.3*    244     CMP:   Recent Labs   Lab 12/14/24  0405 12/15/24  0329   * 133*   K 4.0 3.6   CL 98 100   CO2 25 25   * 175*   BUN 13 12   CREATININE 1.3 1.1   CALCIUM 8.4* 8.2*   ANIONGAP 10 8     Wound Culture:   Recent Labs   Lab 09/12/24  1239 12/13/24  1012   LABAERO STAPHYLOCOCCUS AUREUS  Many  *  SERRATIA MARCESCENS   Few  *  CITROBACTER FREUNDII  Rare  Skin josselin also present  * STAPHYLOCOCCUS AUREUS  Moderate  Susceptibility pending  *     All pertinent labs within the past 24 hours have been reviewed.    Significant Imaging: I have reviewed all pertinent imaging results/findings within the past 24 hours.  MRI Forefoot WO Contrast RT [[id number]] Resulted: 12/13/24 0727   Order Status: Completed Updated: 12/13/24 0730   Narrative:     EXAMINATION:  MRI FOREFOOT WO CONTRAST RT    CLINICAL HISTORY:  Osteomyelitis, foot;    TECHNIQUE:  Noncontrast  MRI of the right forefoot.    COMPARISON:  X-ray 12/12/2024    FINDINGS:  Bones and joints: Operative changes of 2nd metatarsal head resection, 3rd digit resection and 5th digit resection at the level of the metatarsal shaft.  Soft tissue skin defect over the dorsal aspect of the foot at the level of approximately the 2nd metatarsal stump.  Abnormal T2 hyperintensity is seen within the bone of the 3rd metatarsal without definitive T1 hypointensity.  Fluid signal surrounds the periosteum of the 2nd metatarsal shaft as well as well-defined linear T2 hyperintensity within the medullary cavity of the metatarsal shaft.  Arthritic changes of the midfoot.    Soft tissues: Diffuse soft tissue edema is present and there is muscular edema and mild fatty atrophy in keeping with chronic denervation.   Impression:       Cellulitis of the forefoot with postoperative changes as described.    High likelihood of osteomyelitis of the 3rd metatarsal shaft.    Periostitis and intramedullary signal abnormality of the 2nd metatarsal shaft, in this patient with relatively recent osteotomy and soft tissue defect extending to the medullary cavity also representing high likelihood of osteomyelitis.  Differential diagnosis includes a longitudinal stress fracture.      Electronically signed by: Edgar Serrano Jr  Date: 12/13/2024  Time: 07:27   X-Ray Foot Complete Right [[id number]] Resulted: 12/12/24 2303   Order Status: Completed Updated: 12/12/24 2305   Narrative:     EXAMINATION:  XR FOOT COMPLETE 3 VIEW RIGHT    CLINICAL HISTORY:  . Osteomyelitis, unspecified    TECHNIQUE:  AP, lateral, and oblique views of the right foot were performed.    COMPARISON:  12/06/2024.    FINDINGS:  There are postop changes of 2nd, 3rd, and 5th toe amputations, with resection of the 2nd toe metatarsal head and neck, resection at the 3rd toe metatarsophalangeal joint, and resection at the 5th toe distal phalangeal shaft.  There is no radiographic evidence  of acute osteomyelitis.  There is soft tissue gas overlying the 2nd toe metatarsal phalangeal joint region.  There is soft tissue edema of the 2nd toe.  Remaining osseous structures are intact.  There are dorsal osteophytes.  There is soft tissue edema of the dorsum of the foot.  There are vascular calcifications.   Impression:       No radiographic evidence of acute osteomyelitis.  Soft tissue gas of the dorsal aspect of the 2nd toe as above, may be postoperative.  Infection with gas-forming organism not excluded.      Electronically signed by: Pasha Hermosillo  Date: 12/12/2024  Time: 23:03   X-Ray Chest AP Portable [[id number]] Resulted: 12/12/24 1928   Order Status: Completed Updated: 12/12/24 1931   Narrative:     EXAMINATION:  XR CHEST AP PORTABLE    CLINICAL HISTORY:  Cough, unspecified    TECHNIQUE:  Single frontal view of the chest was performed.    COMPARISON:  Chest radiograph 11/28/2022, chest CT 03/31/2017    FINDINGS:  Patient is slightly rotated.  Resolution is limited by body habitus with underpenetration.    No detrimental change.  Cardiomediastinal silhouette is midline and within normal limits for age, stable.  Bibasilar minimal platelike scarring versus atelectasis.  Pulmonary vasculature and hilar contours are within normal limits.  The lungs are otherwise normal to slightly hypoexpanded without consolidation, pleural effusion or pneumothorax identified.  No acute osseous process seen.  PA and lateral views can be obtained.   Impression:       No radiographic evidence of pneumonia or other source of cough/shortness of breath, noting that early/mild viral pneumonia or nonspecific bronchitis may be radiographically occult.      Electronically signed by: Simon Reyes MD  Date: 12/12/2024  Time: 19:28      Imaging History     2024    Date Procedure Name Study Review Link PACS Link Status Accession Number Location   12/14/24 03:04 AM Cardiac monitoring strips Study Review  Final     12/13/24 04:04 AM  Cardiac monitoring strips Study Review  Final     12/13/24 03:26 AM MRI Forefoot WO Contrast RT Study Review  Images Final [id number] [facility]   12/12/24 09:22 PM X-Ray Foot Complete Right Study Review  Images Final [id number] [facility]   12/12/24 05:38 PM X-Ray Chest AP Portable Study Review  Images Final [id number] [facility]   12/06/24 08:47 AM X-Ray Foot Complete Right Study Review  Images Final [id number] [facility]

## 2024-12-16 PROBLEM — B95.8 BACTERIAL INFECTION DUE TO STAPHYLOCOCCUS: Status: ACTIVE | Noted: 2024-12-16

## 2024-12-16 LAB
ASCENDING AORTA: 3.12 CM
AV AREA BY CONTINUOUS VTI: 3.4 CM2
AV INDEX (PROSTH): 1.1
AV LVOT MEAN GRADIENT: 2 MMHG
AV LVOT PEAK GRADIENT: 3 MMHG
AV MEAN GRADIENT: 2.8 MMHG
AV PEAK GRADIENT: 4 MMHG
AV VALVE AREA BY VELOCITY RATIO: 2.5 CM²
AV VALVE AREA: 3.5 CM2
AV VELOCITY RATIO: 0.8
BACTERIA SPEC AEROBE CULT: ABNORMAL
BSA FOR ECHO PROCEDURE: 2.57 M2
CV ECHO LV RWT: 0.51 CM
DOP CALC AO PEAK VEL: 1 M/S
DOP CALC AO VTI: 21 CM
DOP CALC LVOT AREA: 3.1 CM2
DOP CALC LVOT DIAMETER: 2 CM
DOP CALC LVOT PEAK VEL: 0.8 M/S
DOP CALC LVOT STROKE VOLUME: 72.5 CM3
DOP CALCLVOT PEAK VEL VTI: 23.1 CM
E WAVE DECELERATION TIME: 207.05 MS
E/E' RATIO: 9.83 M/S
ECHO EF ESTIMATED: 70 %
ECHO LV POSTERIOR WALL: 1.1 CM (ref 0.6–1.1)
EJECTION FRACTION: 65 %
FRACTIONAL SHORTENING: 39.5 % (ref 28–44)
INTERVENTRICULAR SEPTUM: 1.1 CM (ref 0.6–1.1)
LA MAJOR: 6.55 CM
LA MINOR: 4.8 CM
LA WIDTH: 3.49 CM
LEFT ATRIUM SIZE: 4.24 CM
LEFT ATRIUM VOLUME INDEX: 27.9 ML/M2
LEFT ATRIUM VOLUME: 69.68 CM3
LEFT INTERNAL DIMENSION IN SYSTOLE: 2.6 CM (ref 2.1–4)
LEFT VENTRICLE DIASTOLIC VOLUME INDEX: 33.72 ML/M2
LEFT VENTRICLE DIASTOLIC VOLUME: 84.29 ML
LEFT VENTRICLE MASS INDEX: 65.2 G/M2
LEFT VENTRICLE SYSTOLIC VOLUME INDEX: 10.2 ML/M2
LEFT VENTRICLE SYSTOLIC VOLUME: 25.43 ML
LEFT VENTRICULAR INTERNAL DIMENSION IN DIASTOLE: 4.3 CM (ref 3.5–6)
LEFT VENTRICULAR MASS: 162.9 G
LV LATERAL E/E' RATIO: 9.08 M/S
LV SEPTAL E/E' RATIO: 10.73 M/S
MV PEAK E VEL: 1.18 M/S
OHS CV RV/LV RATIO: 0.77 CM
POCT GLUCOSE: 218 MG/DL (ref 70–110)
POCT GLUCOSE: 261 MG/DL (ref 70–110)
POCT GLUCOSE: 320 MG/DL (ref 70–110)
POCT GLUCOSE: 344 MG/DL (ref 70–110)
RA MAJOR: 4.79 CM
RA PRESSURE ESTIMATED: 3 MMHG
RA WIDTH: 3.51 CM
RIGHT VENTRICLE DIASTOLIC BASEL DIMENSION: 3.3 CM
SINUS: 3.22 CM
STJ: 3.03 CM
TDI LATERAL: 0.13 M/S
TDI SEPTAL: 0.11 M/S
TDI: 0.12 M/S
TRICUSPID ANNULAR PLANE SYSTOLIC EXCURSION: 1.87 CM
Z-SCORE OF LEFT VENTRICULAR DIMENSION IN END DIASTOLE: -11.75
Z-SCORE OF LEFT VENTRICULAR DIMENSION IN END SYSTOLE: -9.14

## 2024-12-16 PROCEDURE — 21400001 HC TELEMETRY ROOM

## 2024-12-16 PROCEDURE — 25000003 PHARM REV CODE 250: Performed by: INTERNAL MEDICINE

## 2024-12-16 PROCEDURE — 63600175 PHARM REV CODE 636 W HCPCS: Performed by: INTERNAL MEDICINE

## 2024-12-16 PROCEDURE — 99233 SBSQ HOSP IP/OBS HIGH 50: CPT | Mod: ,,,

## 2024-12-16 PROCEDURE — 63600175 PHARM REV CODE 636 W HCPCS: Performed by: PHYSICIAN ASSISTANT

## 2024-12-16 PROCEDURE — 25500020 PHARM REV CODE 255: Performed by: HOSPITALIST

## 2024-12-16 PROCEDURE — 25000003 PHARM REV CODE 250: Performed by: PHYSICIAN ASSISTANT

## 2024-12-16 PROCEDURE — 27000207 HC ISOLATION

## 2024-12-16 RX ADMIN — INSULIN ASPART 10 UNITS: 100 INJECTION, SOLUTION INTRAVENOUS; SUBCUTANEOUS at 01:12

## 2024-12-16 RX ADMIN — CARVEDILOL 25 MG: 25 TABLET, FILM COATED ORAL at 09:12

## 2024-12-16 RX ADMIN — INSULIN ASPART 2 UNITS: 100 INJECTION, SOLUTION INTRAVENOUS; SUBCUTANEOUS at 01:12

## 2024-12-16 RX ADMIN — INSULIN GLARGINE 30 UNITS: 100 INJECTION, SOLUTION SUBCUTANEOUS at 08:12

## 2024-12-16 RX ADMIN — INSULIN ASPART 10 UNITS: 100 INJECTION, SOLUTION INTRAVENOUS; SUBCUTANEOUS at 05:12

## 2024-12-16 RX ADMIN — Medication 6 MG: at 12:12

## 2024-12-16 RX ADMIN — INSULIN ASPART 3 UNITS: 100 INJECTION, SOLUTION INTRAVENOUS; SUBCUTANEOUS at 08:12

## 2024-12-16 RX ADMIN — INSULIN ASPART 3 UNITS: 100 INJECTION, SOLUTION INTRAVENOUS; SUBCUTANEOUS at 09:12

## 2024-12-16 RX ADMIN — INSULIN ASPART 10 UNITS: 100 INJECTION, SOLUTION INTRAVENOUS; SUBCUTANEOUS at 08:12

## 2024-12-16 RX ADMIN — ATORVASTATIN CALCIUM 80 MG: 40 TABLET, FILM COATED ORAL at 09:12

## 2024-12-16 RX ADMIN — INSULIN GLARGINE 30 UNITS: 100 INJECTION, SOLUTION SUBCUTANEOUS at 09:12

## 2024-12-16 RX ADMIN — CEFAZOLIN 2 G: 2 INJECTION, POWDER, FOR SOLUTION INTRAMUSCULAR; INTRAVENOUS at 12:12

## 2024-12-16 RX ADMIN — CEFAZOLIN 2 G: 2 INJECTION, POWDER, FOR SOLUTION INTRAMUSCULAR; INTRAVENOUS at 05:12

## 2024-12-16 RX ADMIN — PERFLUTREN 1.5 ML: 6.52 INJECTION, SUSPENSION INTRAVENOUS at 10:12

## 2024-12-16 RX ADMIN — ENOXAPARIN SODIUM 40 MG: 40 INJECTION SUBCUTANEOUS at 05:12

## 2024-12-16 RX ADMIN — CEFAZOLIN 2 G: 2 INJECTION, POWDER, FOR SOLUTION INTRAMUSCULAR; INTRAVENOUS at 08:12

## 2024-12-16 RX ADMIN — INSULIN ASPART 4 UNITS: 100 INJECTION, SOLUTION INTRAVENOUS; SUBCUTANEOUS at 05:12

## 2024-12-16 RX ADMIN — AMLODIPINE BESYLATE 10 MG: 10 TABLET ORAL at 08:12

## 2024-12-16 NOTE — PROGRESS NOTES
[facility] - Med Surg  Infectious Disease  Progress Note    Patient Name: Billy Sheffield Miguel  MRN: [id number]  Admission Date: 12/12/2024  Length of Stay: 4 days  Attending Physician: Katerine Tomlin MD  Primary Care Provider: Augie Ruiz MD    Isolation Status: Contact  Assessment/Plan:      Endocrine  * Diabetic foot ulcer associated with diabetes mellitus due to underlying condition  I have reviewed hospital notes from   service and other specialty providers. I have also reviewed CBC, CMP/BMP,  cultures and imaging with my interpretation as documented.      59 year old male with insulin dependent diabetes, CAD, HLD, with history of prior L foot osteomyelitis s/p left foot hallux amputation, partial left 2nd toe amputation, and right 3rd and 5th toe amputation presented to ER with R foot infection after recent 2nd metatarsal head resection on 12/5/24 with podiatry. Sent home with PO Clinda and Cipro. Started to have weakness, chills, and nausea 5 days after surgery. Presented to ER 12/13/24 and found to have purulence from recent incision site. Recently treated for polymicrobial infection of R foot with prolonged course of Levo and Doxy (end of care in November) prior to surgery.    On admit, afebrile, otherwise VSS. No leukocytosis. Elevated .2 and ESR 83.  MRI R forefoot with cellulitis and high likelihood of osteomyelitis of 2nd metatarsal shaft and 3rd metatarsal shaft. Started on Vancomycin and Cipro in ER. Podiatry following and notes wound probes to bone.  Bone biopsy done 12/13/24 growing staph aureus, pathology pending. Blood cultures 12/13 show staph aureus with BCID indicating MSSA. Repeat blood cultures 12/15/24 no growth x1 day. Vanc and Cipro discontinued, and patient started on Ancef. TTE 12/15/24 without vegetations.    Patient continues to be clinically stable. Afebrile, no leukocytosis. Pain well controlled. Podiatry discussed R TMA with patient, which patient declined at this  time. Podiatry with plans on conservative management with wound care and antibiotics.     Recommendations / Plan:  Bone culture 12/13 with Staph Aureus, susceptibility pending. Follow up bone biopsy pathology   Index blood culture 12/13 with MSSA.  Follow up repeat blood cultures 12/15, no growth x1 day  Continue IV Ancef for MSSA bacteremia and osteo of R foot with staph aureus. Anticipate 6 weeks of antibiotics, IV vs orals, pending culture susceptibilities. Await PICC placement until repeat blood cultures are sterile for 48 hours.   Follow up vascular studies (U/S with VASILIY pending)    -- Discussed with ID staff and primary team   -- ID will continue to follow w/ further recs.        Anticipated Disposition: per primary    Thank you for your consult. I will follow-up with patient. Please contact us if you have any additional questions.    Saadia Carlos PA-C  Infectious Disease  [facility] - Med Surg    Subjective:     Principal Problem:Diabetic foot ulcer associated with diabetes mellitus due to underlying condition    HPI: 59 year old male with insulin dependent diabetes, CAD, HLD, with history of prior L foot osteomyelitis s/p left foot hallux amputation, partial left 2nd toe amputation, and right 3rd and 5th toe amputation presented to ER with R foot infection after recent amputation on 12/5/24 with podiatry.    In 9/2024 R foot with new issue ulceration/blister, podiatry was following weekly for debridement and wound care. Cultures 09/12/2024 growing Staph aureus, Serratia marcescens , Citrobacter freundii, and presumptive prevotella/porphyromonas group. Treated with 6 weeks of PO Doxy and Levo. Underwent 2nd metatarsal head resection of right foot on 12/5/24 with podiatry, Dr. Melara. Treated with PO Clindamycin and Cipro following surgery. Presented to ER 12/13 for 4 days of generalized weakness, chills, nausea, cough, decreased appetite, and nasal congestion. Reported fall on 12/10/24 with no head  injury or LOC. No trauma to foot. His R foot has been wrapped since surgery, but was unwrapped in ER with redness and purulent drainage from incision site.    On admit, afebrile, otherwise VSS. No leukocytosis. Elevated .2 and ESR 83. Blood cultures 12/13 no growth. MRI R forefoot with cellulitis and high likelihood of osteomyelitis of 2nd metatarsal shaft and 3rd metatarsal shaft. Started on IV Vancomycin and IV Cipro in ER. Podiatry and ID consulted.     Patient reports his foot was not bothering him since surgery, it was mainly his other symptoms of cough, congestion, weakness, and chills. Notes upper respiratory symptoms are improving. No fevers.     History of R foot with MSSA and pasturella in 01/2023, treated with 6 weeks of ceftriaxone. Bone culture of L 5th digit with MSSA (01/2024) treated with 6 weeks of Doxycycline, and he saw ID at that time. Hx of L foot culture (04/2024) growing Staph aureus, Acinetobacter baumannii/haemolyticus, Bacteroidies thetaiotaomicron, bacteroides stercoris, and presumptive prevotella/porphyromonas group. Hx of L foot culture (06/2024) growing Enterococcusus faecalis, staph aureus, and Prevotella bivia.   Interval History: Patient notes pain is well controlled. No fevers or chills. No calf pain. He is tolerating IV antibiotics well. No diarrhea today.    Review of Systems   Constitutional:  Negative for chills, diaphoresis and fever.   Respiratory:  Negative for shortness of breath.    Cardiovascular:  Negative for chest pain.   Gastrointestinal:  Negative for abdominal pain, diarrhea, nausea and vomiting.   Genitourinary:  Negative for dysuria and hematuria.   Skin:  Positive for color change and wound.   All other systems reviewed and are negative.    Objective:     Vital Signs (Most Recent):  Temp: 98 °F (36.7 °C) (12/16/24 1127)  Pulse: (!) 56 (12/16/24 1127)  Resp: 16 (12/16/24 0436)  BP: (!) 149/84 (12/16/24 1127)  SpO2: 95 % (12/16/24 1127) Vital Signs (24h  Range):  Temp:  [97.5 °F (36.4 °C)-98.1 °F (36.7 °C)] 98 °F (36.7 °C)  Pulse:  [52-63] 56  Resp:  [16-18] 16  SpO2:  [91 %-97 %] 95 %  BP: (112-150)/(61-84) 149/84     Weight: 126.6 kg (279 lb)  Body mass index is 35.82 kg/m².    Estimated Creatinine Clearance: 101 mL/min (based on SCr of 1.1 mg/dL).     Physical Exam  Vitals and nursing note reviewed.   Constitutional:       General: He is not in acute distress.     Appearance: Normal appearance. He is not ill-appearing, toxic-appearing or diaphoretic.   HENT:      Head: Normocephalic and atraumatic.      Nose: Nose normal.      Mouth/Throat:      Mouth: Mucous membranes are moist.   Eyes:      Conjunctiva/sclera: Conjunctivae normal.   Cardiovascular:      Rate and Rhythm: Normal rate and regular rhythm.      Pulses: Normal pulses.      Heart sounds: No murmur heard.  Pulmonary:      Effort: Pulmonary effort is normal. No respiratory distress.   Abdominal:      Palpations: Abdomen is soft.      Tenderness: There is no abdominal tenderness.   Musculoskeletal:      Right lower leg: Edema present.      Left lower leg: Edema present.   Skin:     Findings: Erythema present.      Comments: R foot bandaged.   Chronic skin/color changes to bilateral lower extremities.   No calf tenderness   Neurological:      Mental Status: He is alert and oriented to person, place, and time.   Psychiatric:         Mood and Affect: Mood normal.          Significant Labs: Blood Culture:   Recent Labs   Lab 12/13/24  0100 12/13/24  0125 12/15/24  1130 12/15/24  1213   LABBLOO No Growth to date  No Growth to date  No Growth to date  No Growth to date Gram stain aer bottle: Gram positive cocci in clusters resembling Staph  Results called to and read back by: Adeline Ferreira RN 12/15/2024  07:17  STAPHYLOCOCCUS AUREUS  Susceptibility pending  [place] consult required [place] [facility] and [facility] locations.  * No Growth to date No Growth to date     CBC:   Recent Labs   Lab  12/15/24  0329   WBC 5.87   HGB 11.7*   HCT 34.3*        CMP:   Recent Labs   Lab 12/15/24  0329   *   K 3.6      CO2 25   *   BUN 12   CREATININE 1.1   CALCIUM 8.2*   ANIONGAP 8     Microbiology Results (last 7 days)       Procedure Component Value Units Date/Time    Aerobic culture [[id number]]  (Abnormal) Collected: 12/13/24 1012    Order Status: Completed Specimen: Bone from Foot, Right Updated: 12/16/24 1000     Aerobic Bacterial Culture STAPHYLOCOCCUS AUREUS  Moderate  Susceptibility pending      Blood culture [[id number]]  (Abnormal) Collected: 12/13/24 0125    Order Status: Completed Specimen: Blood from Peripheral, Hand, Right Updated: 12/16/24 0938     Blood Culture, Routine Gram stain aer bottle: Gram positive cocci in clusters resembling Staph      Results called to and read back by: Adeline Ferreira RN 12/15/2024      07:17      STAPHYLOCOCCUS AUREUS  Susceptibility pending  ID consult required at [facility] locations.      Blood culture [[id number]] Collected: 12/13/24 0100    Order Status: Completed Specimen: Blood from Peripheral, Forearm, Left Updated: 12/16/24 0613     Blood Culture, Routine No Growth to date      No Growth to date      No Growth to date      No Growth to date    Blood culture [[id number]] Collected: 12/15/24 1213    Order Status: Completed Specimen: Blood Updated: 12/15/24 2115     Blood Culture, Routine No Growth to date    Blood culture [[id number]] Collected: 12/15/24 1130    Order Status: Completed Specimen: Blood Updated: 12/15/24 2115     Blood Culture, Routine No Growth to date    Narrative:      From 2 different sites 30 minutes apart    MRSA/SA Rapid ID by PCR from Blood culture [[id number]]  (Abnormal) Collected: 12/13/24 0125    Order Status: Completed Updated: 12/15/24 0921     Staph aureus ID by PCR Positive     Methicillin Resistant ID by PCR Negative    Culture, Anaerobe [[id number]] Collected: 12/13/24 1012    Order  Status: Completed Specimen: Bone from Foot, Right Updated: 12/14/24 1416     Anaerobic Culture Culture in progress    Gram stain [[id number]] Collected: 12/13/24 1012    Order Status: Completed Specimen: Bone from Foot, Right Updated: 12/13/24 1417     Gram Stain Result No WBC's      No organisms seen    Blood culture [[id number]]     Order Status: Canceled Specimen: Blood     Blood culture [[id number]]     Order Status: Canceled Specimen: Blood     Influenza A & B by Molecular [[id number]] Collected: 12/12/24 1612    Order Status: Completed Specimen: Nasopharyngeal Swab Updated: 12/12/24 1730     Influenza A, Molecular Negative     Influenza B, Molecular Negative     Flu A & B Source Nasal swab          Wound Culture:   Recent Labs   Lab 09/12/24  1239 12/13/24  1012   LABAERO STAPHYLOCOCCUS AUREUS  Many  *  SERRATIA MARCESCENS   Few  *  CITROBACTER FREUNDII  Rare  Skin josselin also present  * STAPHYLOCOCCUS AUREUS  Moderate  Susceptibility pending  *       Significant Imaging: I have reviewed all pertinent imaging results/findings within the past 24 hours.

## 2024-12-16 NOTE — PLAN OF CARE
Problem: Adult Inpatient Plan of Care  Goal: Plan of Care Review  12/15/2024 2215 by Vane Jeter RN  Outcome: Progressing  12/15/2024 2215 by Vane Jeter RN  Outcome: Progressing  Goal: Patient-Specific Goal (Individualized)  12/15/2024 2215 by Vane Jeter RN  Outcome: Progressing  12/15/2024 2215 by Vane Jeter RN  Outcome: Progressing  Goal: Absence of Hospital-Acquired Illness or Injury  12/15/2024 2215 by Vane Jeter RN  Outcome: Progressing  12/15/2024 2215 by Vane Jeter RN  Outcome: Progressing  Goal: Optimal Comfort and Wellbeing  12/15/2024 2215 by Vane Jeter RN  Outcome: Progressing  12/15/2024 2215 by Vane Jeter RN  Outcome: Progressing  Goal: Readiness for Transition of Care  12/15/2024 2215 by Vane Jeter RN  Outcome: Progressing  12/15/2024 2215 by Vane Jeter RN  Outcome: Progressing     Problem: Infection  Goal: Absence of Infection Signs and Symptoms  12/15/2024 2215 by Vane Jeter RN  Outcome: Progressing  12/15/2024 2215 by Vane Jeter RN  Outcome: Progressing     Problem: Diabetes Comorbidity  Goal: Blood Glucose Level Within Targeted Range  12/15/2024 2215 by Vane Jeter RN  Outcome: Progressing  12/15/2024 2215 by Vane Jeter RN  Outcome: Progressing     Problem: Wound  Goal: Optimal Coping  12/15/2024 2215 by Vane Jeter RN  Outcome: Progressing  12/15/2024 2215 by Vane Jeter RN  Outcome: Progressing  Goal: Optimal Functional Ability  12/15/2024 2215 by Vane Jeter RN  Outcome: Progressing  12/15/2024 2215 by Vane Jeter RN  Outcome: Progressing  Goal: Absence of Infection Signs and Symptoms  12/15/2024 2215 by Vane Jeter RN  Outcome: Progressing  12/15/2024 2215 by Vane Jeter RN  Outcome: Progressing  Goal: Improved Oral Intake  12/15/2024 2215 by Vane Jeter RN  Outcome: Progressing  12/15/2024 2215 by Vane Jeter RN  Outcome:  Progressing  Goal: Optimal Pain Control and Function  Outcome: Progressing  Goal: Skin Health and Integrity  Outcome: Progressing  Goal: Optimal Wound Healing  Outcome: Progressing     Problem: Fall Injury Risk  Goal: Absence of Fall and Fall-Related Injury  Outcome: Progressing     Problem: Acute Kidney Injury/Impairment  Goal: Fluid and Electrolyte Balance  Outcome: Progressing  Goal: Improved Oral Intake  Outcome: Progressing  Goal: Effective Renal Function  Outcome: Progressing

## 2024-12-16 NOTE — PLAN OF CARE
[facility] Med Surg  Discharge Reassessment    Primary Care Provider: Augie Ruiz MD    Expected Discharge Date: 12/18/2024    Reassessment (most recent)       Discharge Reassessment - 12/16/24 1133          Discharge Reassessment    Assessment Type Discharge Planning Reassessment     Did the patient's condition or plan change since previous assessment? No     Discharge Plan discussed with: Patient     Communicated DARVIN with patient/caregiver Date not available/Unable to determine     Discharge Plan A Home with family     Discharge Plan B Home     DME Needed Upon Discharge  none (P)      Transition of Care Barriers None (P)         Post-Acute Status    Coverage BLUE CROSS BLUE SHIELD - BCBS BLUE SAVER PPO - HD (P)      Discharge Delays None known at this time (P)                    SW met with patient at bedside to discuss discharge planning. Patient does not use any medical equipment in the home. Patient is ambulatory and independent with ADL's. Patient is not medically cleared for discharge at this time.     Discharge Plan A and Plan B have been determined by review of patient's clinical status, future medical and therapeutic needs, and coverage/benefits for post-acute care in coordination with multidisciplinary team members.    MARTIR Cuevas  Case Management  [phone]

## 2024-12-16 NOTE — ASSESSMENT & PLAN NOTE
59-year-old male with PMH CAD, DM2, HLD, HTN, prior pedal amputations presents to ED 12/12/24 for generalized weakness, chills, nausea.  Patient has had recent history with Dr. Mingo Melara DPM for right foot 2nd metatarsal head excision DOS 12/6/24. Podiatry consulted for management of aforementioned problem.     Assessment: Post-surgical infection of right foot incision in setting of DM. Per exam, probe to bone s/p debridement of ulcer. XR of right foot was obtained showing amputation changes of 2nd and 5th rays at the level of metatarsal shafts; no osseous infiltration of infection observed.  MRI of right foot obtained showing OM of the 3rd metatarsal shaft, periostitis versus early OM like changes to the 2nd metatarsal shaft.     Plan:  - Discussed at length surgical vs conservative treatment options for osteomyelitis and current prognosis.  Discussed risks and benefits of each at length.  Discussed surgical treatment plan with patient including TMA postoperative course as well as affects of daily living.  At this time, patient is amenable to conservative treatment including IV antibiotics, regular wound care, and close follow up with both Infectious Disease and Podiatry.  Reiterated risks that come along with conservative treatment, patient gave verbal understanding of risks.  - foot dressed, wound care orders to follow.  - wound culture, bone biopsy growing staph a, antibiotics per Infectious Disease.  - weight-bearing as tolerated to right foot in postop shoe.  - podiatry will follow, please reach out for any questions or concerns or patient change in treatment option.  -

## 2024-12-16 NOTE — PROGRESS NOTES
[facility] Medicine  Progress Note    Patient Name: Billy Rivera  MRN: [id number]  Patient Class: IP- Inpatient   Admission Date: 12/12/2024  Length of Stay: 4 days  Attending Physician: Katerine Tomlin MD  Primary Care Provider: Augie Ruiz MD        Subjective     Principal Problem:Diabetic foot ulcer associated with diabetes mellitus due to underlying condition        HPI:  Billy Rivera is a 59 y.o. male with a PMHx of T2DM, CAD, HTN, HLD, and right foot wound who presents to [facility] for evaluation of left foot infection. Patient had a chronic right foot wound for which he follows with podiatry. He had surgery intervention on his R metatarsal on 12/5. He was prescribed PO clinda and cipro prophylactially following the surgery. He reports felling generally unwell with chills, decreased appetite,generalized weakness since Tueaday. Reports falling earlier today due to generalized weakness, but was able to catch himself with his arms. No head trauma, neck/back pain or LOC. His R foot has been wrapped since the surgery so he hasn't look at his surgical incision until bandage was taken off in the ED. He has purulent drainage from incision site. Denies any LE wound, chest pain, SOB, N/V, abdominal pain or syncope.     ED: AFVSS. No leukocytosis. Na 130, , Cr 1.4 (baseline). XR, MRI pending. Given IV vanc and cipro.     Overview/Hospital Course:  12/13 - bone bx and debridement per podiatry    Interval History:   12/16: patient agrees with conservative management with home antibiotics.     Review of Systems   Constitutional:  Positive for activity change, appetite change, chills and fatigue. Negative for fever.   HENT:  Negative for trouble swallowing.    Eyes:  Negative for photophobia and visual disturbance.   Respiratory:  Negative for chest tightness, shortness of breath and wheezing.    Cardiovascular:  Negative for chest pain, palpitations and leg swelling.    Gastrointestinal:  Negative for abdominal pain, constipation, diarrhea, nausea and vomiting.   Genitourinary:  Negative for dysuria, frequency, hematuria and urgency.   Musculoskeletal:  Negative for arthralgias, back pain and gait problem.   Skin:  Positive for wound. Negative for color change and rash.   Neurological:  Positive for weakness. Negative for dizziness, syncope, light-headedness, numbness and headaches.   Psychiatric/Behavioral:  Negative for agitation and confusion. The patient is not nervous/anxious.      Objective:     Vital Signs (Most Recent):  Temp: 98 °F (36.7 °C) (12/16/24 1127)  Pulse: (!) 56 (12/16/24 1127)  Resp: 16 (12/16/24 0436)  BP: (!) 149/84 (12/16/24 1127)  SpO2: 95 % (12/16/24 1127) Vital Signs (24h Range):  Temp:  [97.5 °F (36.4 °C)-98.1 °F (36.7 °C)] 98 °F (36.7 °C)  Pulse:  [48-63] 56  Resp:  [16-18] 16  SpO2:  [91 %-97 %] 95 %  BP: (112-150)/(61-84) 149/84     Weight: 126.6 kg (279 lb)  Body mass index is 35.82 kg/m².  No intake or output data in the 24 hours ending 12/16/24 1153      Physical Exam  Vitals and nursing note reviewed.   Constitutional:       General: He is not in acute distress.     Appearance: He is well-developed. He is obese.   HENT:      Head: Normocephalic and atraumatic.      Mouth/Throat:      Pharynx: No oropharyngeal exudate.   Eyes:      General: No scleral icterus.     Conjunctiva/sclera: Conjunctivae normal.   Cardiovascular:      Rate and Rhythm: Normal rate and regular rhythm.      Heart sounds: Normal heart sounds.   Pulmonary:      Effort: Pulmonary effort is normal. No respiratory distress.      Breath sounds: Normal breath sounds. No wheezing.   Abdominal:      General: Bowel sounds are normal. There is no distension.      Palpations: Abdomen is soft.      Tenderness: There is no abdominal tenderness.   Musculoskeletal:         General: No tenderness. Normal range of motion.      Cervical back: Normal range of motion and neck supple.    Lymphadenopathy:      Cervical: No cervical adenopathy.   Skin:     General: Skin is warm and dry.      Capillary Refill: Capillary refill takes less than 2 seconds.      Findings: Erythema (BLEs) present. No rash.      Comments: Wound with purulent drainage noted to R foot, see media   Neurological:      Mental Status: He is alert and oriented to person, place, and time.      Cranial Nerves: No cranial nerve deficit.      Sensory: No sensory deficit.      Coordination: Coordination normal.   Psychiatric:         Behavior: Behavior normal.         Thought Content: Thought content normal.         Judgment: Judgment normal.             Significant Labs: All pertinent labs within the past 24 hours have been reviewed.    Significant Imaging: I have reviewed all pertinent imaging results/findings within the past 24 hours.    Assessment and Plan     * Diabetic foot ulcer associated with diabetes mellitus due to underlying condition  - afebrile without leukocytosis  - inflammatory markers elevated  - XR, MRI confirms osteomyelitis of metatarsal 2 and 3  - on  IV vanc and cipro  - 12/13 - podiatry debrided and took bone Bx. - Staph Aureus - MSSA  - ID consult appreciated  - ABX adjustments per ID    JOVANNI (acute kidney injury)  JOVANNI is likely due to pre-renal azotemia due to intravascular volume depletion. Baseline creatinine is  1.2 . Most recent creatinine and eGFR are listed below.  Recent Labs     12/13/24  0603 12/14/24  0405 12/15/24  0329   CREATININE 1.2 1.3 1.1   EGFRNORACEVR >60.0 >60.0 >60.0      Plan  - JOVANNI is improving  - Avoid nephrotoxins and renally dose meds for GFR listed above  - Monitor urine output, serial BMP, and adjust therapy as needed  - holding ACE  - improved    Obesity  Body mass index is 35.9 kg/m². Morbid obesity complicates all aspects of disease management from diagnostic modalities to treatment.    Subacute osteomyelitis of right foot  See Diabetic food infection      Hx of colon cancer,  stage I  - noted hx    Hyponatremia  Hyponatremia is likely due to Dehydration/hypovolemia and Hyperglycemia - corrected ca. 133. The patient's most recent sodium results are listed below.  Recent Labs     12/13/24  0603 12/14/24  0405 12/15/24  0329   * 133* 133*     Plan  - Correct the sodium by 4-6mEq in 24 hours.   - Obtain the following studies: serial labs.  - Will treat the hyponatremia with IV fluids as follows: 100 cc/hr  - Monitor sodium Daily.   - Patient hyponatremia is improving    Type 2 diabetes mellitus 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, with long-term current use of insulin  - overall poor control  - home regimen: insulin 70/30 40U in AM, 30U in PM    - Rx. Adjusted to 12/14  - some improvement this AM - will monitor and adjust further if needed.   - Lantus 30 units BID  - Aspart 10 units with meals.   - LDSSI, ACHS accuchecks  Lab Results   Component Value Date    HGBA1C 10.2 (H) 11/27/2024       Essential hypertension  - on Amlodipine 10, Carvediolol 12.5 BID and Lisinopril 40 mg daily  - BP currently controlled  - will resume as feasible, currently holding ACE due to JOVANNI.       VTE Risk Mitigation (From admission, onward)           Ordered     enoxaparin injection 40 mg  Daily         12/12/24 2308     IP VTE HIGH RISK PATIENT  Once         12/12/24 2308                    Discharge Planning   DARVIN: 12/18/2024     Code Status: Full Code   Medical Readiness for Discharge Date:   Discharge Plan A: Home with family   Discharge Delays: None known at this time                    Katerine Tomlin MD  Department of Hospital Medicine   [facility] Surg

## 2024-12-16 NOTE — ASSESSMENT & PLAN NOTE
I have reviewed hospital notes from   service and other specialty providers. I have also reviewed CBC, CMP/BMP,  cultures and imaging with my interpretation as documented.      59 year old male with insulin dependent diabetes, CAD, HLD, with history of prior L foot osteomyelitis s/p left foot hallux amputation, partial left 2nd toe amputation, and right 3rd and 5th toe amputation presented to ER with R foot infection after recent 2nd metatarsal head resection on 12/5/24 with podiatry. Sent home with PO Clinda and Cipro. Started to have weakness, chills, and nausea 5 days after surgery. Presented to ER 12/13/24 and found to have purulence from recent incision site. Recently treated for polymicrobial infection of R foot with prolonged course of Levo and Doxy (end of care in November) prior to surgery.    On admit, afebrile, otherwise VSS. No leukocytosis. Elevated .2 and ESR 83.  MRI R forefoot with cellulitis and high likelihood of osteomyelitis of 2nd metatarsal shaft and 3rd metatarsal shaft. Started on Vancomycin and Cipro in ER. Podiatry following and notes wound probes to bone.  Bone biopsy done 12/13/24 growing staph aureus, pathology pending. Blood cultures 12/13 show staph aureus with BCID indicating MSSA. Repeat blood cultures 12/15/24 no growth x1 day. Vanc and Cipro discontinued, and patient started on Ancef. TTE 12/15/24 without vegetations.    Patient continues to be clinically stable. Afebrile, no leukocytosis. Pain well controlled. Podiatry discussed R TMA with patient, which patient declined at this time. Podiatry with plans on conservative management with wound care and antibiotics.     Recommendations / Plan:  Bone culture 12/13 with Staph Aureus, susceptibility pending. Follow up bone biopsy pathology   Index blood culture 12/13 with MSSA.  Follow up repeat blood cultures 12/15, no growth x1 day  Continue IV Ancef for MSSA bacteremia and osteo of R foot with staph aureus. Anticipate 6  weeks of antibiotics, IV vs orals, pending culture susceptibilities. Await PICC placement until repeat blood cultures are sterile for 48 hours.   Follow up vascular studies (U/S with VASILIY pending)    -- Discussed with ID staff and primary team   -- ID will continue to follow w/ further recs.

## 2024-12-16 NOTE — PROGRESS NOTES
[facility] - [facility] Surg  Podiatry  Progress Note    Patient Name: Billy Sheffield Miguel  MRN: [id number]  Admission Date: 12/12/2024  Hospital Length of Stay: 4 days  Attending Physician: Katerine Tomlin MD  Primary Care Provider: Augie Ruiz MD     Subjective:     Interval History: NAEON, NAD, VSS. Patient is afebrile, dressings are clean/dry/ and intact.   New pedal complaints: None  New results: Growing Staph A from blood culture, and foot wound/bone biopsy.  Denies post op fever.          Scheduled Meds:   amLODIPine  10 mg Oral Daily    atorvastatin  80 mg Oral QHS    carvediloL  25 mg Oral BID    ceFAZolin (Ancef) IV (PEDS and ADULTS)  2 g Intravenous Q8H    enoxparin  40 mg Subcutaneous Daily    insulin aspart U-100  10 Units Subcutaneous TIDWM    insulin glargine U-100  30 Units Subcutaneous BID     Continuous Infusions:  PRN Meds:  Current Facility-Administered Medications:     acetaminophen, 650 mg, Oral, Q4H PRN    albuterol-ipratropium, 3 mL, Nebulization, Q4H PRN    bisacodyL, 10 mg, Rectal, Daily PRN    dextrose 10%, 12.5 g, Intravenous, PRN    dextrose 10%, 25 g, Intravenous, PRN    diphenhydrAMINE, 25 mg, Intravenous, Q12H PRN    glucagon (human recombinant), 1 mg, Intramuscular, PRN    glucose, 16 g, Oral, PRN    glucose, 24 g, Oral, PRN    insulin aspart U-100, 0-5 Units, Subcutaneous, QID (AC + HS) PRN    melatonin, 6 mg, Oral, Nightly PRN    polyethylene glycol, 17 g, Oral, Daily PRN    Review of Systems   Constitutional:  Negative for chills and fever.   Respiratory:  Negative for shortness of breath.    Cardiovascular:  Negative for chest pain.   Gastrointestinal:  Negative for diarrhea, nausea and vomiting.   Skin:  Positive for wound.     Objective:     Vital Signs (Most Recent):  Temp: 98 °F (36.7 °C) (12/16/24 1127)  Pulse: (!) 56 (12/16/24 1127)  Resp: 16 (12/16/24 0436)  BP: (!) 149/84 (12/16/24 1127)  SpO2: 95 % (12/16/24 1127) Vital Signs (24h Range):  Temp:  [97.5 °F (36.4 °C)-98.1  °F (36.7 °C)] 98 °F (36.7 °C)  Pulse:  [52-63] 56  Resp:  [16-18] 16  SpO2:  [91 %-97 %] 95 %  BP: (112-150)/(61-84) 149/84     Weight: 126.6 kg (279 lb)  Body mass index is 35.82 kg/m².    Foot Exam    General  Orientation: alert and oriented to person, place, and time       Right Foot/Ankle     Inspection and Palpation  Tenderness: none   Swelling: (3rd toe)  Skin Exam: callus, drainage, dry skin, skin changes, abnormal color and ulcer; skin not intact, no cellulitis and no erythema     Neurovascular  Dorsalis pedis: 1+  Posterior tibial: doppler  Saphenous nerve sensation: absent  Tibial nerve sensation: absent  Superficial peroneal nerve sensation: absent  Deep peroneal nerve sensation: absent  Sural nerve sensation: absent    Comments  Partial 3rd and 5th ray amputated. Partial 2nd metatarsal amputated with digit left intact. Incision site at dorsal 2nd metatarsal head noted to contain wound with fibrous base, continued purulence and slough upon manual expression. Wound probes to bone and is Non tender to palpation.  Negligible malodor.      Left Foot/Ankle      Inspection and Palpation  Tenderness: none   Swelling: none   Skin Exam: callus and dry skin; no drainage, no cellulitis, no ulcer and no erythema     Neurovascular  Dorsalis pedis: 1+  Posterior tibial: doppler  Saphenous nerve sensation: absent  Tibial nerve sensation: absent  Superficial peroneal nerve sensation: absent  Deep peroneal nerve sensation: absent  Sural nerve sensation: absent    Comments  Partial 1st ray amputated, stable and healed.  Scab located at dorsal 3rd digit, plantar right foot, as well as erythema on anterior leg; otherwise no other discernible wounds in left lower extremity                  Laboratory:  A1C:   Recent Labs   Lab 11/27/24  0739   HGBA1C 10.2*     CBC:   Recent Labs   Lab 12/15/24  0329   WBC 5.87   RBC 4.04*   HGB 11.7*   HCT 34.3*      MCV 85   MCH 29.0   MCHC 34.1     CMP:   Recent Labs   Lab  12/12/24  1609 12/13/24  0603 12/15/24  0329   *   < > 175*   CALCIUM 8.6*   < > 8.2*   ALBUMIN 2.6*  --   --    PROT 7.5  --   --    *   < > 133*   K 4.9   < > 3.6   CO2 22*   < > 25   CL 96   < > 100   BUN 19   < > 12   CREATININE 1.4   < > 1.1   ALKPHOS 90  --   --    ALT 21  --   --    AST 23  --   --    BILITOT 0.7  --   --     < > = values in this interval not displayed.     CRP:   Recent Labs   Lab 12/15/24  0329   CRP 62.4*     ESR:   Recent Labs   Lab 12/12/24  2112   SEDRATE 83*     Wound Cultures:   Recent Labs   Lab 09/12/24  1239 12/13/24  1012   LABAERO STAPHYLOCOCCUS AUREUS  Many  *  SERRATIA MARCESCENS   Few  *  CITROBACTER FREUNDII  Rare  Skin josselin also present  * STAPHYLOCOCCUS AUREUS  Moderate  Susceptibility pending  *     Microbiology Results (last 7 days)       Procedure Component Value Units Date/Time    Aerobic culture [[id number]]  (Abnormal) Collected: 12/13/24 1012    Order Status: Completed Specimen: Bone from Foot, Right Updated: 12/16/24 1000     Aerobic Bacterial Culture STAPHYLOCOCCUS AUREUS  Moderate  Susceptibility pending      Blood culture [[id number]]  (Abnormal) Collected: 12/13/24 0125    Order Status: Completed Specimen: Blood from Peripheral, Hand, Right Updated: 12/16/24 0938     Blood Culture, Routine Gram stain aer bottle: Gram positive cocci in clusters resembling Staph      Results called to and read back by: Hodan Godwin, Charge RN 12/15/2024      07:17      STAPHYLOCOCCUS AUREUS  Susceptibility pending  ID consult required at [facility].[place] and [place] locations.      Blood culture [[id number]] Collected: 12/13/24 0100    Order Status: Completed Specimen: Blood from Peripheral, Forearm, Left Updated: 12/16/24 0613     Blood Culture, Routine No Growth to date      No Growth to date      No Growth to date      No Growth to date    Blood culture [[id number]] Collected: 12/15/24 1213    Order Status: Completed Specimen: Blood Updated: 12/15/24 2115      Blood Culture, Routine No Growth to date    Blood culture [[id number]] Collected: 12/15/24 1130    Order Status: Completed Specimen: Blood Updated: 12/15/24 2115     Blood Culture, Routine No Growth to date    Narrative:      From 2 different sites 30 minutes apart    MRSA/SA Rapid ID by PCR from Blood culture [[id number]]  (Abnormal) Collected: 12/13/24 0125    Order Status: Completed Updated: 12/15/24 0921     Staph aureus ID by PCR Positive     Methicillin Resistant ID by PCR Negative    Culture, Anaerobe [[id number]] Collected: 12/13/24 1012    Order Status: Completed Specimen: Bone from Foot, Right Updated: 12/14/24 1416     Anaerobic Culture Culture in progress    Gram stain [[id number]] Collected: 12/13/24 1012    Order Status: Completed Specimen: Bone from Foot, Right Updated: 12/13/24 1417     Gram Stain Result No WBC's      No organisms seen    Blood culture [[id number]]     Order Status: Canceled Specimen: Blood     Blood culture [[id number]]     Order Status: Canceled Specimen: Blood     Influenza A & B by Molecular [[id number]] Collected: 12/12/24 1612    Order Status: Completed Specimen: Nasopharyngeal Swab Updated: 12/12/24 1730     Influenza A, Molecular Negative     Influenza B, Molecular Negative     Flu A & B Source Nasal swab          Specimen (24h ago, onward)      None            Diagnostic Results:  I have reviewed all pertinent imaging results/findings within the past 24 hours.    Assessment/Plan:     Endocrine  * Diabetic foot ulcer associated with diabetes mellitus due to underlying condition  59-year-old male with PMH CAD, DM2, HLD, HTN, prior pedal amputations presents to ED 12/12/24 for generalized weakness, chills, nausea.  Patient has had recent history with Dr. Mingo Melara DPRALPH for right foot 2nd metatarsal head excision DOS 12/6/24. Podiatry consulted for management of aforementioned problem.     Assessment: Post-surgical infection of right foot incision in setting of DM. Per  exam, probe to bone s/p debridement of ulcer. XR of right foot was obtained showing amputation changes of 2nd and 5th rays at the level of metatarsal shafts; no osseous infiltration of infection observed.  MRI of right foot obtained showing OM of the 3rd metatarsal shaft, periostitis versus early OM like changes to the 2nd metatarsal shaft.     Plan:  - Discussed at length surgical vs conservative treatment options for osteomyelitis and current prognosis.  Discussed risks and benefits of each at length.  Discussed surgical treatment plan with patient including TMA postoperative course as well as affects of daily living.  At this time, patient is amenable to conservative treatment including IV antibiotics, regular wound care, and close follow up with both Infectious Disease and Podiatry.  Reiterated risks that come along with conservative treatment, patient gave verbal understanding of risks.  - foot dressed, wound care orders to follow.  - wound culture, bone biopsy growing staph a, antibiotics per Infectious Disease.  - weight-bearing as tolerated to right foot in postop shoe.  - podiatry will follow, please reach out for any questions or concerns or patient change in treatment option.  -         Robert Brand DPM  Podiatry  [facility] - Med Surg

## 2024-12-16 NOTE — SUBJECTIVE & OBJECTIVE
Interval History:   12/16: patient agrees with conservative management with home antibiotics.     Review of Systems   Constitutional:  Positive for activity change, appetite change, chills and fatigue. Negative for fever.   HENT:  Negative for trouble swallowing.    Eyes:  Negative for photophobia and visual disturbance.   Respiratory:  Negative for chest tightness, shortness of breath and wheezing.    Cardiovascular:  Negative for chest pain, palpitations and leg swelling.   Gastrointestinal:  Negative for abdominal pain, constipation, diarrhea, nausea and vomiting.   Genitourinary:  Negative for dysuria, frequency, hematuria and urgency.   Musculoskeletal:  Negative for arthralgias, back pain and gait problem.   Skin:  Positive for wound. Negative for color change and rash.   Neurological:  Positive for weakness. Negative for dizziness, syncope, light-headedness, numbness and headaches.   Psychiatric/Behavioral:  Negative for agitation and confusion. The patient is not nervous/anxious.      Objective:     Vital Signs (Most Recent):  Temp: 98 °F (36.7 °C) (12/16/24 1127)  Pulse: (!) 56 (12/16/24 1127)  Resp: 16 (12/16/24 0436)  BP: (!) 149/84 (12/16/24 1127)  SpO2: 95 % (12/16/24 1127) Vital Signs (24h Range):  Temp:  [97.5 °F (36.4 °C)-98.1 °F (36.7 °C)] 98 °F (36.7 °C)  Pulse:  [48-63] 56  Resp:  [16-18] 16  SpO2:  [91 %-97 %] 95 %  BP: (112-150)/(61-84) 149/84     Weight: 126.6 kg (279 lb)  Body mass index is 35.82 kg/m².  No intake or output data in the 24 hours ending 12/16/24 1153      Physical Exam  Vitals and nursing note reviewed.   Constitutional:       General: He is not in acute distress.     Appearance: He is well-developed. He is obese.   HENT:      Head: Normocephalic and atraumatic.      Mouth/Throat:      Pharynx: No oropharyngeal exudate.   Eyes:      General: No scleral icterus.     Conjunctiva/sclera: Conjunctivae normal.   Cardiovascular:      Rate and Rhythm: Normal rate and regular rhythm.       Heart sounds: Normal heart sounds.   Pulmonary:      Effort: Pulmonary effort is normal. No respiratory distress.      Breath sounds: Normal breath sounds. No wheezing.   Abdominal:      General: Bowel sounds are normal. There is no distension.      Palpations: Abdomen is soft.      Tenderness: There is no abdominal tenderness.   Musculoskeletal:         General: No tenderness. Normal range of motion.      Cervical back: Normal range of motion and neck supple.   Lymphadenopathy:      Cervical: No cervical adenopathy.   Skin:     General: Skin is warm and dry.      Capillary Refill: Capillary refill takes less than 2 seconds.      Findings: Erythema (BLEs) present. No rash.      Comments: Wound with purulent drainage noted to R foot, see media   Neurological:      Mental Status: He is alert and oriented to person, place, and time.      Cranial Nerves: No cranial nerve deficit.      Sensory: No sensory deficit.      Coordination: Coordination normal.   Psychiatric:         Behavior: Behavior normal.         Thought Content: Thought content normal.         Judgment: Judgment normal.             Significant Labs: All pertinent labs within the past 24 hours have been reviewed.    Significant Imaging: I have reviewed all pertinent imaging results/findings within the past 24 hours.

## 2024-12-16 NOTE — SUBJECTIVE & OBJECTIVE
Subjective:     Interval History: NAEON, NAD, VSS. Patient is afebrile, dressings are clean/dry/ and intact.   New pedal complaints: None  New results: Growing Staph A from blood culture, and foot wound/bone biopsy.  Denies post op fever.          Scheduled Meds:   amLODIPine  10 mg Oral Daily    atorvastatin  80 mg Oral QHS    carvediloL  25 mg Oral BID    ceFAZolin (Ancef) IV (PEDS and ADULTS)  2 g Intravenous Q8H    enoxparin  40 mg Subcutaneous Daily    insulin aspart U-100  10 Units Subcutaneous TIDWM    insulin glargine U-100  30 Units Subcutaneous BID     Continuous Infusions:  PRN Meds:  Current Facility-Administered Medications:     acetaminophen, 650 mg, Oral, Q4H PRN    albuterol-ipratropium, 3 mL, Nebulization, Q4H PRN    bisacodyL, 10 mg, Rectal, Daily PRN    dextrose 10%, 12.5 g, Intravenous, PRN    dextrose 10%, 25 g, Intravenous, PRN    diphenhydrAMINE, 25 mg, Intravenous, Q12H PRN    glucagon (human recombinant), 1 mg, Intramuscular, PRN    glucose, 16 g, Oral, PRN    glucose, 24 g, Oral, PRN    insulin aspart U-100, 0-5 Units, Subcutaneous, QID (AC + HS) PRN    melatonin, 6 mg, Oral, Nightly PRN    polyethylene glycol, 17 g, Oral, Daily PRN    Review of Systems   Constitutional:  Negative for chills and fever.   Respiratory:  Negative for shortness of breath.    Cardiovascular:  Negative for chest pain.   Gastrointestinal:  Negative for diarrhea, nausea and vomiting.   Skin:  Positive for wound.     Objective:     Vital Signs (Most Recent):  Temp: 98 °F (36.7 °C) (12/16/24 1127)  Pulse: (!) 56 (12/16/24 1127)  Resp: 16 (12/16/24 0436)  BP: (!) 149/84 (12/16/24 1127)  SpO2: 95 % (12/16/24 1127) Vital Signs (24h Range):  Temp:  [97.5 °F (36.4 °C)-98.1 °F (36.7 °C)] 98 °F (36.7 °C)  Pulse:  [52-63] 56  Resp:  [16-18] 16  SpO2:  [91 %-97 %] 95 %  BP: (112-150)/(61-84) 149/84     Weight: 126.6 kg (279 lb)  Body mass index is 35.82 kg/m².    Foot Exam    General  Orientation: alert and oriented to  person, place, and time       Right Foot/Ankle     Inspection and Palpation  Tenderness: none   Swelling: (3rd toe)  Skin Exam: callus, drainage, dry skin, skin changes, abnormal color and ulcer; skin not intact, no cellulitis and no erythema     Neurovascular  Dorsalis pedis: 1+  Posterior tibial: doppler  Saphenous nerve sensation: absent  Tibial nerve sensation: absent  Superficial peroneal nerve sensation: absent  Deep peroneal nerve sensation: absent  Sural nerve sensation: absent    Comments  Partial 3rd and 5th ray amputated. Partial 2nd metatarsal amputated with digit left intact. Incision site at dorsal 2nd metatarsal head noted to contain wound with fibrous base, continued purulence and slough upon manual expression. Wound probes to bone and is Non tender to palpation.  Negligible malodor.      Left Foot/Ankle      Inspection and Palpation  Tenderness: none   Swelling: none   Skin Exam: callus and dry skin; no drainage, no cellulitis, no ulcer and no erythema     Neurovascular  Dorsalis pedis: 1+  Posterior tibial: doppler  Saphenous nerve sensation: absent  Tibial nerve sensation: absent  Superficial peroneal nerve sensation: absent  Deep peroneal nerve sensation: absent  Sural nerve sensation: absent    Comments  Partial 1st ray amputated, stable and healed.  Scab located at dorsal 3rd digit, plantar right foot, as well as erythema on anterior leg; otherwise no other discernible wounds in left lower extremity                  Laboratory:  A1C:   Recent Labs   Lab 11/27/24  0739   HGBA1C 10.2*     CBC:   Recent Labs   Lab 12/15/24  0329   WBC 5.87   RBC 4.04*   HGB 11.7*   HCT 34.3*      MCV 85   MCH 29.0   MCHC 34.1     CMP:   Recent Labs   Lab 12/12/24  1609 12/13/24  0603 12/15/24  0329   *   < > 175*   CALCIUM 8.6*   < > 8.2*   ALBUMIN 2.6*  --   --    PROT 7.5  --   --    *   < > 133*   K 4.9   < > 3.6   CO2 22*   < > 25   CL 96   < > 100   BUN 19   < > 12   CREATININE 1.4   < >  1.1   ALKPHOS 90  --   --    ALT 21  --   --    AST 23  --   --    BILITOT 0.7  --   --     < > = values in this interval not displayed.     CRP:   Recent Labs   Lab 12/15/24  0329   CRP 62.4*     ESR:   Recent Labs   Lab 12/12/24 2112   SEDRATE 83*     Wound Cultures:   Recent Labs   Lab 09/12/24  1239 12/13/24  1012   LABAERO STAPHYLOCOCCUS AUREUS  Many  *  SERRATIA MARCESCENS   Few  *  CITROBACTER FREUNDII  Rare  Skin josselin also present  * STAPHYLOCOCCUS AUREUS  Moderate  Susceptibility pending  *     Microbiology Results (last 7 days)       Procedure Component Value Units Date/Time    Aerobic culture [[id number]]  (Abnormal) Collected: 12/13/24 1012    Order Status: Completed Specimen: Bone from Foot, Right Updated: 12/16/24 1000     Aerobic Bacterial Culture STAPHYLOCOCCUS AUREUS  Moderate  Susceptibility pending      Blood culture [[id number]]  (Abnormal) Collected: 12/13/24 0125    Order Status: Completed Specimen: Blood from Peripheral, Hand, Right Updated: 12/16/24 0938     Blood Culture, Routine Gram stain aer bottle: Gram positive cocci in clusters resembling Staph      Results called to and read back by: Adeline Ferreira RN 12/15/2024      07:17      STAPHYLOCOCCUS AUREUS  Susceptibility pending  [facility]      Blood culture [[id number]] Collected: 12/13/24 0100    Order Status: Completed Specimen: Blood from Peripheral, Forearm, Left Updated: 12/16/24 0613     Blood Culture, Routine No Growth to date      No Growth to date      No Growth to date      No Growth to date    Blood culture [[id number]] Collected: 12/15/24 1213    Order Status: Completed Specimen: Blood Updated: 12/15/24 2115     Blood Culture, Routine No Growth to date    Blood culture [[id number]] Collected: 12/15/24 1130    Order Status: Completed Specimen: Blood Updated: 12/15/24 2115     Blood Culture, Routine No Growth to date    Narrative:      From 2 different sites 30  minutes apart    MRSA/SA Rapid ID by PCR from Blood culture [[id number]]  (Abnormal) Collected: 12/13/24 0125    Order Status: Completed Updated: 12/15/24 0921     Staph aureus ID by PCR Positive     Methicillin Resistant ID by PCR Negative    Culture, Anaerobe [[id number]] Collected: 12/13/24 1012    Order Status: Completed Specimen: Bone from Foot, Right Updated: 12/14/24 1416     Anaerobic Culture Culture in progress    Gram stain [[id number]] Collected: 12/13/24 1012    Order Status: Completed Specimen: Bone from Foot, Right Updated: 12/13/24 1417     Gram Stain Result No WBC's      No organisms seen    Blood culture [[id number]]     Order Status: Canceled Specimen: Blood     Blood culture [[id number]]     Order Status: Canceled Specimen: Blood     Influenza A & B by Molecular [[id number]] Collected: 12/12/24 1612    Order Status: Completed Specimen: Nasopharyngeal Swab Updated: 12/12/24 1730     Influenza A, Molecular Negative     Influenza B, Molecular Negative     Flu A & B Source Nasal swab          Specimen (24h ago, onward)      None            Diagnostic Results:  I have reviewed all pertinent imaging results/findings within the past 24 hours.

## 2024-12-16 NOTE — SUBJECTIVE & OBJECTIVE
Interval History: Patient notes pain is well controlled. No fevers or chills. No calf pain. He is tolerating IV antibiotics well. No diarrhea today.    Review of Systems   Constitutional:  Negative for chills, diaphoresis and fever.   Respiratory:  Negative for shortness of breath.    Cardiovascular:  Negative for chest pain.   Gastrointestinal:  Negative for abdominal pain, diarrhea, nausea and vomiting.   Genitourinary:  Negative for dysuria and hematuria.   Skin:  Positive for color change and wound.   All other systems reviewed and are negative.    Objective:     Vital Signs (Most Recent):  Temp: 98 °F (36.7 °C) (12/16/24 1127)  Pulse: (!) 56 (12/16/24 1127)  Resp: 16 (12/16/24 0436)  BP: (!) 149/84 (12/16/24 1127)  SpO2: 95 % (12/16/24 1127) Vital Signs (24h Range):  Temp:  [97.5 °F (36.4 °C)-98.1 °F (36.7 °C)] 98 °F (36.7 °C)  Pulse:  [52-63] 56  Resp:  [16-18] 16  SpO2:  [91 %-97 %] 95 %  BP: (112-150)/(61-84) 149/84     Weight: 126.6 kg (279 lb)  Body mass index is 35.82 kg/m².    Estimated Creatinine Clearance: 101 mL/min (based on SCr of 1.1 mg/dL).     Physical Exam  Vitals and nursing note reviewed.   Constitutional:       General: He is not in acute distress.     Appearance: Normal appearance. He is not ill-appearing, toxic-appearing or diaphoretic.   HENT:      Head: Normocephalic and atraumatic.      Nose: Nose normal.      Mouth/Throat:      Mouth: Mucous membranes are moist.   Eyes:      Conjunctiva/sclera: Conjunctivae normal.   Cardiovascular:      Rate and Rhythm: Normal rate and regular rhythm.      Pulses: Normal pulses.      Heart sounds: No murmur heard.  Pulmonary:      Effort: Pulmonary effort is normal. No respiratory distress.   Abdominal:      Palpations: Abdomen is soft.      Tenderness: There is no abdominal tenderness.   Musculoskeletal:      Right lower leg: Edema present.      Left lower leg: Edema present.   Skin:     Findings: Erythema present.      Comments: R foot bandaged.    Chronic skin/color changes to bilateral lower extremities.   No calf tenderness   Neurological:      Mental Status: He is alert and oriented to person, place, and time.   Psychiatric:         Mood and Affect: Mood normal.          Significant Labs: Blood Culture:   Recent Labs   Lab 12/13/24  0100 12/13/24  0125 12/15/24  1130 12/15/24  1213   LABBLOO No Growth to date  No Growth to date  No Growth to date  No Growth to date Gram stain aer bottle: Gram positive cocci in clusters resembling Staph  Results called to and read back by: Adeline Ferreira RN 12/15/2024  07:17  STAPHYLOCOCCUS AUREUS  Susceptibility pending  ID consult required at [facility] and [facility] locations.  * No Growth to date No Growth to date     CBC:   Recent Labs   Lab 12/15/24  0329   WBC 5.87   HGB 11.7*   HCT 34.3*        CMP:   Recent Labs   Lab 12/15/24  0329   *   K 3.6      CO2 25   *   BUN 12   CREATININE 1.1   CALCIUM 8.2*   ANIONGAP 8     Microbiology Results (last 7 days)       Procedure Component Value Units Date/Time    Aerobic culture [[id number]]  (Abnormal) Collected: 12/13/24 1012    Order Status: Completed Specimen: Bone from Foot, Right Updated: 12/16/24 1000     Aerobic Bacterial Culture STAPHYLOCOCCUS AUREUS  Moderate  Susceptibility pending      Blood culture [[id number]]  (Abnormal) Collected: 12/13/24 0125    Order Status: Completed Specimen: Blood from Peripheral, Hand, Right Updated: 12/16/24 0938     Blood Culture, Routine Gram stain aer bottle: Gram positive cocci in clusters resembling Staph      Results called to and read back by: Adeline Ferreira RN 12/15/2024      07:17      STAPHYLOCOCCUS AUREUS  Susceptibility pending  [facility] locations.      Blood culture [[id number]] Collected: 12/13/24 0100    Order Status: Completed Specimen: Blood from Peripheral, Forearm, Left Updated: 12/16/24 0613     Blood Culture, Routine No  Growth to date      No Growth to date      No Growth to date      No Growth to date    Blood culture [[id number]] Collected: 12/15/24 1213    Order Status: Completed Specimen: Blood Updated: 12/15/24 2115     Blood Culture, Routine No Growth to date    Blood culture [[id number]] Collected: 12/15/24 1130    Order Status: Completed Specimen: Blood Updated: 12/15/24 2115     Blood Culture, Routine No Growth to date    Narrative:      From 2 different sites 30 minutes apart    MRSA/SA Rapid ID by PCR from Blood culture [[id number]]  (Abnormal) Collected: 12/13/24 0125    Order Status: Completed Updated: 12/15/24 0921     Staph aureus ID by PCR Positive     Methicillin Resistant ID by PCR Negative    Culture, Anaerobe [[id number]] Collected: 12/13/24 1012    Order Status: Completed Specimen: Bone from Foot, Right Updated: 12/14/24 1416     Anaerobic Culture Culture in progress    Gram stain [[id number]] Collected: 12/13/24 1012    Order Status: Completed Specimen: Bone from Foot, Right Updated: 12/13/24 1417     Gram Stain Result No WBC's      No organisms seen    Blood culture [[id number]]     Order Status: Canceled Specimen: Blood     Blood culture [[id number]]     Order Status: Canceled Specimen: Blood     Influenza A & B by Molecular [[id number]] Collected: 12/12/24 1612    Order Status: Completed Specimen: Nasopharyngeal Swab Updated: 12/12/24 1730     Influenza A, Molecular Negative     Influenza B, Molecular Negative     Flu A & B Source Nasal swab          Wound Culture:   Recent Labs   Lab 09/12/24  1239 12/13/24  1012   LABAERO STAPHYLOCOCCUS AUREUS  Many  *  SERRATIA MARCESCENS   Few  *  CITROBACTER FREUNDII  Rare  Skin josselin also present  * STAPHYLOCOCCUS AUREUS  Moderate  Susceptibility pending  *       Significant Imaging: I have reviewed all pertinent imaging results/findings within the past 24 hours.

## 2024-12-17 LAB
BACTERIA SPEC ANAEROBE CULT: NORMAL
POCT GLUCOSE: 181 MG/DL (ref 70–110)
POCT GLUCOSE: 265 MG/DL (ref 70–110)
POCT GLUCOSE: 266 MG/DL (ref 70–110)
POCT GLUCOSE: 312 MG/DL (ref 70–110)

## 2024-12-17 PROCEDURE — 0QBN3ZX EXCISION OF RIGHT METATARSAL, PERCUTANEOUS APPROACH, DIAGNOSTIC: ICD-10-PCS | Performed by: PODIATRIST

## 2024-12-17 PROCEDURE — 63600175 PHARM REV CODE 636 W HCPCS: Performed by: PHYSICIAN ASSISTANT

## 2024-12-17 PROCEDURE — C1751 CATH, INF, PER/CENT/MIDLINE: HCPCS

## 2024-12-17 PROCEDURE — 76937 US GUIDE VASCULAR ACCESS: CPT

## 2024-12-17 PROCEDURE — 02HV33Z INSERTION OF INFUSION DEVICE INTO SUPERIOR VENA CAVA, PERCUTANEOUS APPROACH: ICD-10-PCS | Performed by: INTERNAL MEDICINE

## 2024-12-17 PROCEDURE — 27000207 HC ISOLATION

## 2024-12-17 PROCEDURE — 21400001 HC TELEMETRY ROOM

## 2024-12-17 PROCEDURE — 99233 SBSQ HOSP IP/OBS HIGH 50: CPT | Mod: ,,,

## 2024-12-17 PROCEDURE — 36573 INSJ PICC RS&I 5 YR+: CPT

## 2024-12-17 PROCEDURE — 25000003 PHARM REV CODE 250: Performed by: INTERNAL MEDICINE

## 2024-12-17 PROCEDURE — 99024 POSTOP FOLLOW-UP VISIT: CPT | Mod: GC,,, | Performed by: PODIATRIST

## 2024-12-17 PROCEDURE — 25000003 PHARM REV CODE 250: Performed by: PHYSICIAN ASSISTANT

## 2024-12-17 PROCEDURE — 0LBV0ZZ EXCISION OF RIGHT FOOT TENDON, OPEN APPROACH: ICD-10-PCS | Performed by: PODIATRIST

## 2024-12-17 RX ORDER — INSULIN ASPART 100 [IU]/ML
12 INJECTION, SOLUTION INTRAVENOUS; SUBCUTANEOUS
Status: DISCONTINUED | OUTPATIENT
Start: 2024-12-17 | End: 2024-12-18 | Stop reason: HOSPADM

## 2024-12-17 RX ORDER — SODIUM CHLORIDE 0.9 % (FLUSH) 0.9 %
10 SYRINGE (ML) INJECTION EVERY 12 HOURS PRN
Status: DISCONTINUED | OUTPATIENT
Start: 2024-12-17 | End: 2024-12-18 | Stop reason: HOSPADM

## 2024-12-17 RX ORDER — INSULIN GLARGINE 100 [IU]/ML
35 INJECTION, SOLUTION SUBCUTANEOUS 2 TIMES DAILY
Status: DISCONTINUED | OUTPATIENT
Start: 2024-12-17 | End: 2024-12-18 | Stop reason: HOSPADM

## 2024-12-17 RX ADMIN — CEFAZOLIN 2 G: 2 INJECTION, POWDER, FOR SOLUTION INTRAMUSCULAR; INTRAVENOUS at 08:12

## 2024-12-17 RX ADMIN — ENOXAPARIN SODIUM 40 MG: 40 INJECTION SUBCUTANEOUS at 05:12

## 2024-12-17 RX ADMIN — AMLODIPINE BESYLATE 10 MG: 10 TABLET ORAL at 09:12

## 2024-12-17 RX ADMIN — INSULIN ASPART 3 UNITS: 100 INJECTION, SOLUTION INTRAVENOUS; SUBCUTANEOUS at 05:12

## 2024-12-17 RX ADMIN — INSULIN GLARGINE 30 UNITS: 100 INJECTION, SOLUTION SUBCUTANEOUS at 08:12

## 2024-12-17 RX ADMIN — INSULIN ASPART 12 UNITS: 100 INJECTION, SOLUTION INTRAVENOUS; SUBCUTANEOUS at 05:12

## 2024-12-17 RX ADMIN — INSULIN ASPART 2 UNITS: 100 INJECTION, SOLUTION INTRAVENOUS; SUBCUTANEOUS at 09:12

## 2024-12-17 RX ADMIN — INSULIN ASPART 10 UNITS: 100 INJECTION, SOLUTION INTRAVENOUS; SUBCUTANEOUS at 12:12

## 2024-12-17 RX ADMIN — INSULIN ASPART 10 UNITS: 100 INJECTION, SOLUTION INTRAVENOUS; SUBCUTANEOUS at 08:12

## 2024-12-17 RX ADMIN — CEFAZOLIN 2 G: 2 INJECTION, POWDER, FOR SOLUTION INTRAMUSCULAR; INTRAVENOUS at 01:12

## 2024-12-17 RX ADMIN — CEFAZOLIN 2 G: 2 INJECTION, POWDER, FOR SOLUTION INTRAMUSCULAR; INTRAVENOUS at 05:12

## 2024-12-17 RX ADMIN — INSULIN GLARGINE 35 UNITS: 100 INJECTION, SOLUTION SUBCUTANEOUS at 09:12

## 2024-12-17 RX ADMIN — INSULIN ASPART 3 UNITS: 100 INJECTION, SOLUTION INTRAVENOUS; SUBCUTANEOUS at 08:12

## 2024-12-17 RX ADMIN — ATORVASTATIN CALCIUM 80 MG: 40 TABLET, FILM COATED ORAL at 09:12

## 2024-12-17 NOTE — ASSESSMENT & PLAN NOTE
I have reviewed hospital notes from   service and other specialty providers. I have also reviewed CBC, CMP/BMP,  cultures and imaging with my interpretation as documented.      59 year old male with insulin dependent diabetes, CAD, HLD, with history of prior L foot osteomyelitis s/p left foot hallux amputation, partial left 2nd toe amputation, and right 3rd and 5th toe amputation presented to ER with R foot infection after recent 2nd metatarsal head resection on 12/5/24 with podiatry. Sent home with PO Clinda and Cipro. Started to have weakness, chills, and nausea 5 days after surgery. Presented to ER 12/13/24 and found to have purulence from incision site. Recently treated for polymicrobial infection of R foot with prolonged course of Levo and Doxy (end of care in November) prior to surgery.    On admit, afebrile, otherwise VSS. No leukocytosis. Elevated .2 and ESR 83. MRI R forefoot with cellulitis and high likelihood of osteomyelitis of 2nd metatarsal shaft and 3rd metatarsal shaft. Started on Vancomycin and Cipro in ER. Podiatry following and notes wound probes to bone.  Bone biopsy done 12/13/24 growing staph aureus (Sens Oxacillin, Penicillin, Bactrim). Blood cultures 12/13 show staph aureus with BCID indicating MSSA. Repeat blood cultures 12/15/24 no growth x2 day. Vanc and Cipro discontinued, and patient started on IV Ancef. TTE 12/15/24 without vegetations. Podiatry discussed R TMA with patient, which patient declined at this time, and plan for conservative management with wound care and antibiotics. U/S with VASILIY 12/17 shows no hemodynamically significant stenosis with Right and Left VASILIY of 1.3.     Patient continues to be clinically stable. Afebrile, no leukocytosis. CRP downtrending, now 62. Pain well controlled. He has completed IV abx through PICC line before and is comfortable with process.    Recommendations / Plan:  Bone culture 12/13 with Staph Aureus. Follow up bone biopsy pathology    Index blood culture 12/13 with MSSA.  Follow up repeat blood cultures 12/15, no growth x2 day  Continue IV Ancef for MSSA bacteremia and osteo of R foot with staph aureus for a duration of 6 weeks; start date of cleared blood cultures (12/15/2024) and EOC 1/26/2025.  Needs PICC placement   Pt will need home health to draw weekly labs and PICC line dressing changes  Wound care per podiatry  Pt to follow up in ID clinic in about 3 weeks and near end of care. Will attempt to coordinate outpatient ID and podiatry clinic visits together for continuity of care.     -- Discussed with ID staff and primary team.  -- [facility]    Outpatient Antibiotic Therapy Plan:    Please send referral to [facility].    1) Infection: MSSA osteomyelitis of R 2nd metatarsal shaft; MSSA bacteremia     2) Discharge Antibiotics:    Intravenous antibiotics:  Cefazolin 6g IV q 24 hours     3) Therapy Duration:  6 weeks    Estimated end date of IV antibiotics: 01/26/2025    4) Outpatient Weekly Labs:    Order the following labs to be drawn on Mondays:   CBC  CMP   CRP    5) Fax Lab Results to Infectious Diseases Provider: Saadia Carlos PA-C    [facility] Fax Number: [phone]    6) Outpatient Infectious Diseases Follow-up    Follow-up appointment will be arranged by the ID clinic and will be found in the patient's appointments tab.    Prior to discharge, please ensure the patient's follow-up has been scheduled.    If there is still no follow-up scheduled prior to discharge, please send an EPIC message to [facility] Clinical Pool or Call Infectious Diseases Dept.

## 2024-12-17 NOTE — CONSULTS
Double lumen PICC to Right Basilic vein.  40 cm in length, 34 cm arm circumference and 0 cm exposed.   Lot # [id number].

## 2024-12-17 NOTE — PROGRESS NOTES
[facility] Medicine  Progress Note    Patient Name: Billy Rivera  MRN: [id number]  Patient Class: IP- Inpatient   Admission Date: 12/12/2024  Length of Stay: 5 days  Attending Physician: Katerine Tomlin MD  Primary Care Provider: Augie Ruiz MD        Subjective     Principal Problem:Diabetic foot ulcer associated with diabetes mellitus due to underlying condition        HPI:  Billy Rivera is a 59 y.o. male with a PMHx of T2DM, CAD, HTN, HLD, and right foot wound who presents to [facility] for evaluation of left foot infection. Patient had a chronic right foot wound for which he follows with podiatry. He had surgery intervention on his R metatarsal on 12/5. He was prescribed PO clinda and cipro prophylactially following the surgery. He reports felling generally unwell with chills, decreased appetite,generalized weakness since Tueaday. Reports falling earlier today due to generalized weakness, but was able to catch himself with his arms. No head trauma, neck/back pain or LOC. His R foot has been wrapped since the surgery so he hasn't look at his surgical incision until bandage was taken off in the ED. He has purulent drainage from incision site. Denies any LE wound, chest pain, SOB, N/V, abdominal pain or syncope.     ED: AFVSS. No leukocytosis. Na 130, , Cr 1.4 (baseline). XR, MRI pending. Given IV vanc and cipro.     Overview/Hospital Course:  12/13 - bone bx and debridement per podiatry    Interval History:   12/17: patient will need PICC line placement. Final ID recs today    Review of Systems   Constitutional:  Positive for activity change, appetite change, chills and fatigue. Negative for fever.   HENT:  Negative for trouble swallowing.    Eyes:  Negative for photophobia and visual disturbance.   Respiratory:  Negative for chest tightness, shortness of breath and wheezing.    Cardiovascular:  Negative for chest pain, palpitations and leg swelling.   Gastrointestinal:   Negative for abdominal pain, constipation, diarrhea, nausea and vomiting.   Genitourinary:  Negative for dysuria, frequency, hematuria and urgency.   Musculoskeletal:  Negative for arthralgias, back pain and gait problem.   Skin:  Positive for wound. Negative for color change and rash.   Neurological:  Positive for weakness. Negative for dizziness, syncope, light-headedness, numbness and headaches.   Psychiatric/Behavioral:  Negative for agitation and confusion. The patient is not nervous/anxious.      Objective:     Vital Signs (Most Recent):  Temp: 98.2 °F (36.8 °C) (12/17/24 1220)  Pulse: (!) 49 (12/17/24 1220)  Resp: 18 (12/17/24 1220)  BP: 135/87 (12/17/24 1220)  SpO2: 95 % (12/17/24 1220) Vital Signs (24h Range):  Temp:  [97.3 °F (36.3 °C)-98.2 °F (36.8 °C)] 98.2 °F (36.8 °C)  Pulse:  [48-68] 49  Resp:  [18-20] 18  SpO2:  [93 %-97 %] 95 %  BP: (120-148)/(73-87) 135/87     Weight: 126.6 kg (279 lb)  Body mass index is 35.82 kg/m².    Intake/Output Summary (Last 24 hours) at 12/17/2024 1446  Last data filed at 12/17/2024 1411  Gross per 24 hour   Intake 2097.78 ml   Output 800 ml   Net 1297.78 ml         Physical Exam  Vitals and nursing note reviewed.   Constitutional:       General: He is not in acute distress.     Appearance: He is well-developed. He is obese.   HENT:      Head: Normocephalic and atraumatic.      Mouth/Throat:      Pharynx: No oropharyngeal exudate.   Eyes:      General: No scleral icterus.     Conjunctiva/sclera: Conjunctivae normal.   Cardiovascular:      Rate and Rhythm: Normal rate and regular rhythm.      Heart sounds: Normal heart sounds.   Pulmonary:      Effort: Pulmonary effort is normal. No respiratory distress.      Breath sounds: Normal breath sounds. No wheezing.   Abdominal:      General: Bowel sounds are normal. There is no distension.      Palpations: Abdomen is soft.      Tenderness: There is no abdominal tenderness.   Musculoskeletal:         General: No tenderness. Normal  range of motion.      Cervical back: Normal range of motion and neck supple.   Lymphadenopathy:      Cervical: No cervical adenopathy.   Skin:     General: Skin is warm and dry.      Capillary Refill: Capillary refill takes less than 2 seconds.      Findings: Erythema (BLEs) present. No rash.      Comments: Wound with purulent drainage noted to R foot, see media   Neurological:      Mental Status: He is alert and oriented to person, place, and time.      Cranial Nerves: No cranial nerve deficit.      Sensory: No sensory deficit.      Coordination: Coordination normal.   Psychiatric:         Behavior: Behavior normal.         Thought Content: Thought content normal.         Judgment: Judgment normal.             Significant Labs: All pertinent labs within the past 24 hours have been reviewed.    Significant Imaging: I have reviewed all pertinent imaging results/findings within the past 24 hours.    Assessment and Plan     * Diabetic foot ulcer associated with diabetes mellitus due to underlying condition  - afebrile without leukocytosis  - inflammatory markers elevated  - XR, MRI confirms osteomyelitis of metatarsal 2 and 3  - on  IV vanc and cipro  - 12/13 - podiatry debrided and took bone Bx. - Staph Aureus - MSSA  - ID consult appreciated  - ABX adjustments per ID    JOVANNI (acute kidney injury)  JOVANNI is likely due to pre-renal azotemia due to intravascular volume depletion. Baseline creatinine is  1.2 . Most recent creatinine and eGFR are listed below.  Recent Labs     12/13/24  0603 12/14/24  0405 12/15/24  0329   CREATININE 1.2 1.3 1.1   EGFRNORACEVR >60.0 >60.0 >60.0      Plan  - JOVANNI is improving  - Avoid nephrotoxins and renally dose meds for GFR listed above  - Monitor urine output, serial BMP, and adjust therapy as needed  - holding ACE  - improved    Obesity  Body mass index is 35.9 kg/m². Morbid obesity complicates all aspects of disease management from diagnostic modalities to treatment.    Subacute  osteomyelitis of right foot  See Diabetic food infection      Hx of colon cancer, stage I  - noted hx    Hyponatremia  Hyponatremia is likely due to Dehydration/hypovolemia and Hyperglycemia - corrected ca. 133. The patient's most recent sodium results are listed below.  Recent Labs     12/13/24  0603 12/14/24  0405 12/15/24  0329   * 133* 133*     Plan  - Correct the sodium by 4-6mEq in 24 hours.   - Obtain the following studies: serial labs.  - Will treat the hyponatremia with IV fluids as follows: 100 cc/hr  - Monitor sodium Daily.   - Patient hyponatremia is improving    Type 2 diabetes mellitus 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, with long-term current use of insulin  - overall poor control  - home regimen: insulin 70/30 40U in AM, 30U in PM    - Rx. Adjusted to 12/14  - some improvement this AM - will monitor and adjust further if needed.   - Lantus 30 units BID  - Aspart 10 units with meals.   - LDSSI, ACHS accuchecks  Lab Results   Component Value Date    HGBA1C 10.2 (H) 11/27/2024       Essential hypertension  - on Amlodipine 10, Carvediolol 12.5 BID and Lisinopril 40 mg daily  - BP currently controlled  - will resume as feasible, currently holding ACE due to JOVANNI.       VTE Risk Mitigation (From admission, onward)           Ordered     enoxaparin injection 40 mg  Daily         12/12/24 2308     IP VTE HIGH RISK PATIENT  Once         12/12/24 2308                    Discharge Planning   DARVIN: 12/18/2024     Code Status: Full Code   Medical Readiness for Discharge Date:   Discharge Plan A: Home with family   Discharge Delays: None known at this time                    Katerine Tomlin MD  Department of Hospital Medicine   [facility] S[facility]

## 2024-12-17 NOTE — SUBJECTIVE & OBJECTIVE
Interval History:   12/17: patient will need PICC line placement. Final ID recs today    Review of Systems   Constitutional:  Positive for activity change, appetite change, chills and fatigue. Negative for fever.   HENT:  Negative for trouble swallowing.    Eyes:  Negative for photophobia and visual disturbance.   Respiratory:  Negative for chest tightness, shortness of breath and wheezing.    Cardiovascular:  Negative for chest pain, palpitations and leg swelling.   Gastrointestinal:  Negative for abdominal pain, constipation, diarrhea, nausea and vomiting.   Genitourinary:  Negative for dysuria, frequency, hematuria and urgency.   Musculoskeletal:  Negative for arthralgias, back pain and gait problem.   Skin:  Positive for wound. Negative for color change and rash.   Neurological:  Positive for weakness. Negative for dizziness, syncope, light-headedness, numbness and headaches.   Psychiatric/Behavioral:  Negative for agitation and confusion. The patient is not nervous/anxious.      Objective:     Vital Signs (Most Recent):  Temp: 98.2 °F (36.8 °C) (12/17/24 1220)  Pulse: (!) 49 (12/17/24 1220)  Resp: 18 (12/17/24 1220)  BP: 135/87 (12/17/24 1220)  SpO2: 95 % (12/17/24 1220) Vital Signs (24h Range):  Temp:  [97.3 °F (36.3 °C)-98.2 °F (36.8 °C)] 98.2 °F (36.8 °C)  Pulse:  [48-68] 49  Resp:  [18-20] 18  SpO2:  [93 %-97 %] 95 %  BP: (120-148)/(73-87) 135/87     Weight: 126.6 kg (279 lb)  Body mass index is 35.82 kg/m².    Intake/Output Summary (Last 24 hours) at 12/17/2024 1446  Last data filed at 12/17/2024 1411  Gross per 24 hour   Intake 2097.78 ml   Output 800 ml   Net 1297.78 ml         Physical Exam  Vitals and nursing note reviewed.   Constitutional:       General: He is not in acute distress.     Appearance: He is well-developed. He is obese.   HENT:      Head: Normocephalic and atraumatic.      Mouth/Throat:      Pharynx: No oropharyngeal exudate.   Eyes:      General: No scleral icterus.      Conjunctiva/sclera: Conjunctivae normal.   Cardiovascular:      Rate and Rhythm: Normal rate and regular rhythm.      Heart sounds: Normal heart sounds.   Pulmonary:      Effort: Pulmonary effort is normal. No respiratory distress.      Breath sounds: Normal breath sounds. No wheezing.   Abdominal:      General: Bowel sounds are normal. There is no distension.      Palpations: Abdomen is soft.      Tenderness: There is no abdominal tenderness.   Musculoskeletal:         General: No tenderness. Normal range of motion.      Cervical back: Normal range of motion and neck supple.   Lymphadenopathy:      Cervical: No cervical adenopathy.   Skin:     General: Skin is warm and dry.      Capillary Refill: Capillary refill takes less than 2 seconds.      Findings: Erythema (BLEs) present. No rash.      Comments: Wound with purulent drainage noted to R foot, see media   Neurological:      Mental Status: He is alert and oriented to person, place, and time.      Cranial Nerves: No cranial nerve deficit.      Sensory: No sensory deficit.      Coordination: Coordination normal.   Psychiatric:         Behavior: Behavior normal.         Thought Content: Thought content normal.         Judgment: Judgment normal.             Significant Labs: All pertinent labs within the past 24 hours have been reviewed.    Significant Imaging: I have reviewed all pertinent imaging results/findings within the past 24 hours.

## 2024-12-17 NOTE — PLAN OF CARE
[facility]  Discharge Reassessment    Primary Care Provider: Augie Ruiz MD    Expected Discharge Date: 12/18/2024    SW in communication with pt regarding d/c plan and recommendation of Home Health and Home IV ABX and is agreeable to plan    Patient/family provided list of facilities in-network with patient's payor plan. Providers that are owned, operated, or affiliated with [facility] are included on the list.     Notified that referral sent to below listed facilities from in-network list based on proximity to home/family support:   [facility]    Patient/family instructed to identify preference.    Preferred Facility: (if more than 1, listed in order of descending preference)  [facility]/ [facility]    If an additional preferred facility not listed above is identified, additional referral to be sent. If above facilities unable to accept, will send additional referrals to in-network providers.     Discharge Plan A and Plan B have been determined by review of patient's clinical status, future medical and therapeutic needs, and coverage/benefits for post-acute care in coordination with multidisciplinary team members.    Reassessment (most recent)       Discharge Reassessment - 12/17/24 1637          Discharge Reassessment    Assessment Type Discharge Planning Reassessment     Did the patient's condition or plan change since previous assessment? No     Discharge Plan discussed with: Patient     Communicated DARVIN with patient/caregiver Yes     Discharge Plan A Home with family;Home;Home Health;Other   Home IV Therapy; referral to [facility].    Discharge Plan B Home     DME Needed Upon Discharge  none     Transition of Care Barriers None     Why the patient remains in the hospital Requires continued medical care        Post-Acute Status    Post-Acute Authorization IV Infusion;Home Health     Home Health Status Referrals Sent     Coverage Blue Cross Blue Shield     IV Infusion Status  Referral(s) sent     Discharge Delays None known at this time                     Bell Serrano LMSW  Part-Time-  [facility]  Ext. [phone]

## 2024-12-17 NOTE — SUBJECTIVE & OBJECTIVE
Past Medical History:   Diagnosis Date    Allergy     CAD (coronary artery disease), native coronary artery 6/25/2013    Cancer     COVID-19 virus detected 9/1/2020    Diabetes mellitus     Diabetes mellitus, type 2     Disorder of kidney and ureter     Heart attack 04/2012    Hx of colon cancer, stage I     Hyperlipidemia     Hypertension     Muscular pain     post-op after colonoscopy    NSTEMI May 2013 - peak troponin 0.22 5/22/2013    MICHAELA (obstructive sleep apnea)     Retinopathy due to secondary diabetes        Past Surgical History:   Procedure Laterality Date    COLONOSCOPY N/A 2/17/2017    Procedure: COLONOSCOPY;  Surgeon: Simon Gomez MD;  Location: [facility] ENDO (4TH FLR);  Service: Endoscopy;  Laterality: N/A;    CORONARY ANGIOPLASTY      EXCISION, TARSAL OR METATARSAL BONE, EXCLUDING TALUS OR CALCANEUS, PARTIAL Right 12/6/2024    Procedure: EXCISION,TARSAL OR METATARSAL BONE,EXCLUDING TALUS OR CALCANEUS,PARTIAL;  Surgeon: Mingo Melara DPM;  Location: [facility];  Service: Podiatry;  Laterality: Right;    INCISION AND DRAINAGE FOOT Right 6/5/2018    Procedure: INCISION AND DRAINAGE, FOOT;  Surgeon: Bridget Santana DPM;  Location: [facility] 1ST FLR;  Service: Podiatry;  Laterality: Right;  Request mini C arm in room. request wound vac with medium black sponge    INCISION AND DRAINAGE FOOT Right 6/7/2018    Procedure: INCISION AND DRAINAGE, FOOT;  Surgeon: Emelia Brock DPM;  Location: [facility] OR 2ND FLR;  Service: Podiatry;  Laterality: Right;    INCISION AND DRAINAGE FOOT Left 9/4/2020    Procedure: INCISION AND DRAINAGE, FOOT;  Surgeon: Ainsley Powell DPM;  Location: [facility] OR 2ND FLR;  Service: Podiatry;  Laterality: Left;    INCISION AND DRAINAGE FOOT Left 12/22/2020    Procedure: INCISION AND DRAINAGE, FOOT;  Surgeon: Ainsley Powell DPM;  Location: [facility] OR 2ND FLR;  Service: Podiatry;  Laterality: Left;  May need micro choice  Need Jam shidi needles  Stretcher OK      INCISION AND DRAINAGE OF ABSCESS  Right 6/2/2018    Procedure: INCISION AND DRAINAGE, ABSCESS;  Surgeon: Bridget Santana DPM;  Location: [facility] OR 2ND FLR;  Service: General;  Laterality: Right;    OSTEOTOMY OF METATARSAL BONE  9/4/2020    Procedure: OSTEOTOMY, METATARSAL BONE, 1st METATARSAL RESECTION;  Surgeon: Ainsley Powell DPM;  Location: [facility] OR 2ND FLR;  Service: Podiatry;;    REMOVAL OF FOREIGN BODY FROM FOOT Right 6/7/2018    Procedure: REMOVAL, FOREIGN BODY, FOOT;  Surgeon: Emelia Brock DPM;  Location: [facility] OR 2ND FLR;  Service: Podiatry;  Laterality: Right;    TOE AMPUTATION Left 7/4/2020    Procedure: AMPUTATION, TOE;  Surgeon: Bridget Santana DPM;  Location: [facility] OR 2ND FLR;  Service: Podiatry;  Laterality: Left;    TOE AMPUTATION Left 10/14/2020    Procedure: AMPUTATION, TOE;  Surgeon: Mingo Melara DPM;  Location: [facility] OR 2ND FLR;  Service: Podiatry;  Laterality: Left;  stretcher OK    TOE AMPUTATION Right 6/9/2022    Procedure: AMPUTATION, TOE - Dr. Melara @ [place] in am;  Surgeon: Mingo Melara DPM;  Location: [facility] OR 1ST FLR;  Service: Podiatry;  Laterality: Right;    TOE AMPUTATION Right 11/23/2022    Procedure: AMPUTATION, TOE;  Surgeon: Hansa Robertson DPM;  Location: [facility] OR 2ND FLR;  Service: Podiatry;  Laterality: Right;    TOE AMPUTATION Right 11/25/2022    Procedure: AMPUTATION, TOE;  Surgeon: Chris Groves DPM;  Location: [facility] OR 2ND FLR;  Service: Podiatry;  Laterality: Right;    TONSILLECTOMY         Review of patient's allergies indicates:   Allergen Reactions    Penicillins Other (See Comments)     PCN allergy as a child - was told he went into a coma. Tolerates Cefazolin without adverse reactions    Shellfish containing products      Other reaction(s): Unknown    Vancomycin Itching     Tolerated vancomycin 7/2020    Bactrim  [sulfamethoxazole-trimethoprim] Rash       Medications:  Medications Prior to Admission   Medication Sig    acetaminophen (TYLENOL) 500 MG tablet Take 1,000 mg by mouth  daily as needed for Pain.    amLODIPine (NORVASC) 10 MG tablet Take 1 tablet (10 mg total) by mouth once daily.    aspirin (ECOTRIN) 81 MG EC tablet Take 1 tablet (81 mg total) by mouth once daily.    atorvastatin (LIPITOR) 80 MG tablet Take 1 tablet (80 mg total) by mouth every evening.    atorvastatin (LIPITOR) 80 MG tablet Take 80 mg by mouth.    bismuth subsalicylate (PEPTO BISMOL) 262 mg/15 mL suspension Take 15 mLs by mouth daily as needed (diarrhea).    blood sugar diagnostic (ACCU-CHEK GUIDE TEST STRIPS) Strp 1 each by Misc.(Non-Drug; Combo Route) route 5 (five) times daily.    blood-glucose sensor Haley Inject 1 each into the skin every 7 days.    carvediloL (COREG) 12.5 MG tablet Take 0.5 tablets (6.25 mg total) by mouth 2 (two) times daily with meals.    carvediloL (COREG) 12.5 MG tablet Take 12.5 mg by mouth.    ciprofloxacin HCl (CIPRO) 500 MG tablet Take 1 tablet (500 mg total) by mouth every 12 (twelve) hours.    clindamycin (CLEOCIN) 300 MG capsule Take 1 capsule (300 mg total) by mouth every 6 (six) hours.    doxycycline (VIBRAMYCIN) 100 MG Cap Take 1 capsule (100 mg total) by mouth 2 (two) times daily.    insulin aspart, niacinamide, (FIASP U-100 INSULIN) 100 unit/mL Soln Inject 100 Units into the skin continuous. Gives via insulin pump up to 100 units daily. Do not directly inject. Uses as directed. Rate is 1.75 units/hour - carb counts as well for meal time boluses.    insulin lispro 100 unit/mL pen Inject into the skin.    insulin NPH/Reg human (NOVOLIN 70-30 FLEXPEN U-100) 100 unit/mL (70-30) InPn pen Inject 36 units before breakfast and 30 units before dinner    insulin regular 100 unit/mL Inj injection Inject 100 Units into the skin once daily. Inject up to 100u via insulin pump daily    lancets (ACCU-CHEK FASTCLIX LANCET DRUM) Misc 1 each by Misc.(Non-Drug; Combo Route) route Daily.    levoFLOXacin (LEVAQUIN) 750 MG tablet Take 1 tablet (750 mg total) by mouth once daily.    lisinopriL  "(PRINIVIL,ZESTRIL) 40 MG tablet Take 1 tablet (40 mg total) by mouth once daily.    metFORMIN (GLUCOPHAGE) 1000 MG tablet Take 1 tablet (1,000 mg total) by mouth 2 (two) times daily with meals.    multivit-min/folic/vit K/lycop (MEN'S MULTIVITAMIN ORAL) Take 1 tablet by mouth once daily.    multivitamin (MULTIPLE VITAMIN ORAL) Take 1 capsule by mouth.    oxyCODONE-acetaminophen (PERCOCET) 7.5-325 mg per tablet Take 1 tablet by mouth every 6 (six) hours as needed for Pain. (Patient not taking: Reported on 12/9/2024)    pen needle, diabetic 31 gauge x 3/16" Ndle Inject 1 each into the skin 4 (four) times daily.    semaglutide (OZEMPIC) 0.25 mg or 0.5 mg (2 mg/3 mL) pen injector Inject 0.5 mg into the skin every 7 days.    semaglutide (OZEMPIC) 0.25 mg or 0.5 mg (2 mg/3 mL) pen injector Inject 0.5 mg into the skin.    subcutaneous insulin pump (MINIMED 780G INSULIN PUMP) Misc 1 each by Misc.(Non-Drug; Combo Route) route once daily.     Antibiotics (From admission, onward)      Start     Stop Route Frequency Ordered    12/15/24 1700  ceFAZolin 2 g         -- IV Every 8 hours (non-standard times) 12/15/24 1558          Antifungals (From admission, onward)      None          Antivirals (From admission, onward)      None             Immunization History   Administered Date(s) Administered    COVID-19, MRNA, LN-S, PF (MODERNA FULL 0.5 ML DOSE) 03/30/2021, 04/28/2021    Influenza 01/18/2019    Influenza - Intradermal - Quadrivalent - PF 10/24/2013    Influenza - Quadrivalent - PF *Preferred* (6 months and older) 11/08/2010, 09/28/2020, 12/07/2022    Pneumococcal Conjugate - 13 Valent 09/28/2020    Pneumococcal Polysaccharide - 23 Valent 06/29/2017    Tdap 09/08/2015    Zoster Recombinant 09/28/2020       Family History       Problem Relation (Age of Onset)    Diabetes Mother, Father, Maternal Grandmother, Maternal Grandfather, Paternal Grandmother, Paternal Grandfather    Heart disease Mother, Brother          Social History "     Socioeconomic History    Marital status:    Tobacco Use    Smoking status: Never     Passive exposure: Never    Smokeless tobacco: Never   Substance and Sexual Activity    Alcohol use: Yes     Comment: rare x1 beer-     Drug use: No    Sexual activity: Not Currently     Social Drivers of Health     Financial Resource Strain: Low Risk  (12/13/2024)    Overall Financial Resource Strain (CARDIA)     Difficulty of Paying Living Expenses: Not hard at all   Food Insecurity: No Food Insecurity (12/13/2024)    Hunger Vital Sign     Worried About Running Out of Food in the Last Year: Never true     Ran Out of Food in the Last Year: Never true   Transportation Needs: No Transportation Needs (12/13/2024)    TRANSPORTATION NEEDS     Transportation : No   Physical Activity: Inactive (12/13/2024)    Exercise Vital Sign     Days of Exercise per Week: 0 days     Minutes of Exercise per Session: 0 min   Stress: No Stress Concern Present (12/13/2024)    [employer] - Occupational Stress Questionnaire     Feeling of Stress : Only a little   Housing Stability: Low Risk  (12/13/2024)    Housing Stability Vital Sign     Unable to Pay for Housing in the Last Year: No     Homeless in the Last Year: No     Review of Systems   Constitutional:  Negative for chills, diaphoresis and fever.   Respiratory:  Negative for shortness of breath.    Cardiovascular:  Negative for chest pain.   Gastrointestinal:  Negative for abdominal pain, diarrhea, nausea and vomiting.   Genitourinary:  Negative for dysuria and hematuria.   Skin:  Positive for color change and wound.   All other systems reviewed and are negative.    Objective:     Vital Signs (Most Recent):  Temp: 98.2 °F (36.8 °C) (12/17/24 1220)  Pulse: (!) 49 (12/17/24 1220)  Resp: 18 (12/17/24 1220)  BP: 135/87 (12/17/24 1220)  SpO2: 95 % (12/17/24 1220) Vital Signs (24h Range):  Temp:  [97.3 °F (36.3 °C)-98.2 °F (36.8 °C)] 98.2 °F (36.8 °C)  Pulse:  [48-68]  49  Resp:  [18-20] 18  SpO2:  [93 %-97 %] 95 %  BP: (120-148)/(73-87) 135/87     Weight: 126.6 kg (279 lb)  Body mass index is 35.82 kg/m².    Estimated Creatinine Clearance: 101 mL/min (based on SCr of 1.1 mg/dL).     Physical Exam  Vitals and nursing note reviewed.   Constitutional:       General: He is not in acute distress.     Appearance: Normal appearance. He is not ill-appearing, toxic-appearing or diaphoretic.   HENT:      Head: Normocephalic and atraumatic.      Nose: Nose normal.      Mouth/Throat:      Mouth: Mucous membranes are moist.   Eyes:      Conjunctiva/sclera: Conjunctivae normal.   Cardiovascular:      Rate and Rhythm: Normal rate and regular rhythm.      Pulses: Normal pulses.      Heart sounds: No murmur heard.  Pulmonary:      Effort: Pulmonary effort is normal. No respiratory distress.   Abdominal:      Palpations: Abdomen is soft.      Tenderness: There is no abdominal tenderness.   Musculoskeletal:      Right lower leg: Edema present.      Left lower leg: Edema present.   Skin:     Findings: Erythema present.      Comments: R foot bandaged. Erythema improving in RLE.    Chronic skin/color changes to bilateral lower extremities.     Healing laceration to Right 2nd digit with some erythema, minimal swelling, no warmth or fluctuance to finger. See photo in media.   Neurological:      Mental Status: He is alert and oriented to person, place, and time.   Psychiatric:         Mood and Affect: Mood normal.          Significant Labs: Blood Culture:   Recent Labs   Lab 12/13/24  0100 12/13/24  0125 12/15/24  1130 12/15/24  1213   LABBLOO No Growth to date  No Growth to date  No Growth to date  No Growth to date  No Growth to date Gram stain aer bottle: Gram positive cocci in clusters resembling Staph  Results called to and read back by: Adeline Ferreira RN 12/15/2024  07:17  STAPHYLOCOCCUS AUREUS  Susceptibility pending  ID consult required at [facility] [place].  *  No Growth to date  No Growth to date No Growth to date  No Growth to date       Microbiology Results (last 7 days)       Procedure Component Value Units Date/Time    Blood culture [[id number]]  (Abnormal) Collected: 12/13/24 0125    Order Status: Completed Specimen: Blood from Peripheral, Hand, Right Updated: 12/17/24 0953     Blood Culture, Routine Gram stain aer bottle: Gram positive cocci in clusters resembling Staph      Results called to and read back by: Adeline Ferreira RN 12/15/2024      07:17      STAPHYLOCOCCUS AUREUS  Susceptibility pending  ID consult required at [facility] locations.      Culture, Anaerobe [[id number]] Collected: 12/13/24 1012    Order Status: Completed Specimen: Bone from Foot, Right Updated: 12/17/24 0858     Anaerobic Culture No anaerobes isolated    Blood culture [[id number]] Collected: 12/13/24 0100    Order Status: Completed Specimen: Blood from Peripheral, Forearm, Left Updated: 12/17/24 0613     Blood Culture, Routine No Growth to date      No Growth to date      No Growth to date      No Growth to date      No Growth to date    Aerobic culture [[id number]]  (Abnormal)  (Susceptibility) Collected: 12/13/24 1012    Order Status: Completed Specimen: Bone from Foot, Right Updated: 12/16/24 1427     Aerobic Bacterial Culture STAPHYLOCOCCUS AUREUS  Moderate      Blood culture [[id number]] Collected: 12/15/24 1213    Order Status: Completed Specimen: Blood Updated: 12/16/24 1412     Blood Culture, Routine No Growth to date      No Growth to date    Blood culture [[id number]] Collected: 12/15/24 1130    Order Status: Completed Specimen: Blood Updated: 12/16/24 1412     Blood Culture, Routine No Growth to date      No Growth to date    Narrative:      From 2 different sites 30 minutes apart    MRSA/SA Rapid ID by PCR from Blood culture [[id number]]  (Abnormal) Collected: 12/13/24 0125    Order Status: Completed Updated: 12/15/24 0921     Staph aureus ID by PCR  "Positive     Methicillin Resistant ID by PCR Negative    Gram stain [[id number]] Collected: 12/13/24 1012    Order Status: Completed Specimen: Bone from Foot, Right Updated: 12/13/24 1417     Gram Stain Result No WBC's      No organisms seen    Blood culture [[id number]]     Order Status: Canceled Specimen: Blood     Blood culture [[id number]]     Order Status: Canceled Specimen: Blood     Influenza A & B by Molecular [[id number]] Collected: 12/12/24 1612    Order Status: Completed Specimen: Nasopharyngeal Swab Updated: 12/12/24 1730     Influenza A, Molecular Negative     Influenza B, Molecular Negative     Flu A & B Source Nasal swab          Urine Culture: No results for input(s): "LABURIN" in the last 4320 hours.  Urine Studies:   Recent Labs   Lab 12/12/24  1650   COLORU Yellow   APPEARANCEUA Clear   PHUR 6.0   SPECGRAV 1.030   PROTEINUA 2+*   GLUCUA 4+*   KETONESU Trace*   BILIRUBINUA Negative   OCCULTUA Trace*   NITRITE Negative   LEUKOCYTESUR Negative   RBCUA 4   WBCUA 1   BACTERIA Occasional   SQUAMEPITHEL 0   HYALINECASTS 26*     Wound Culture:   Recent Labs   Lab 09/12/24  1239 12/13/24  1012   LABAERO STAPHYLOCOCCUS AUREUS  Many  *  SERRATIA MARCESCENS   Few  *  CITROBACTER FREUNDII  Rare  Skin josselin also present  * STAPHYLOCOCCUS AUREUS  Moderate  *       Significant Imaging: I have reviewed all pertinent imaging results/findings within the past 24 hours.  "

## 2024-12-17 NOTE — PROGRESS NOTES
[facility] - Med Surg  Infectious Disease  Progress Note    Patient Name: Billy Sheffield Miguel  MRN: [id number]  Admission Date: 12/12/2024  Length of Stay: 5 days  Attending Physician: Katerine Tomlin MD  Primary Care Provider: Augie Ruiz MD    Isolation Status: Contact  Assessment/Plan:      Endocrine  * Diabetic foot ulcer associated with diabetes mellitus due to underlying condition  I have reviewed hospital notes from   service and other specialty providers. I have also reviewed CBC, CMP/BMP,  cultures and imaging with my interpretation as documented.      59 year old male with insulin dependent diabetes, CAD, HLD, with history of prior L foot osteomyelitis s/p left foot hallux amputation, partial left 2nd toe amputation, and right 3rd and 5th toe amputation presented to ER with R foot infection after recent 2nd metatarsal head resection on 12/5/24 with podiatry. Sent home with PO Clinda and Cipro. Started to have weakness, chills, and nausea 5 days after surgery. Presented to ER 12/13/24 and found to have purulence from incision site. Recently treated for polymicrobial infection of R foot with prolonged course of Levo and Doxy (end of care in November) prior to surgery.    On admit, afebrile, otherwise VSS. No leukocytosis. Elevated .2 and ESR 83. MRI R forefoot with cellulitis and high likelihood of osteomyelitis of 2nd metatarsal shaft and 3rd metatarsal shaft. Started on Vancomycin and Cipro in ER. Podiatry following and notes wound probes to bone.  Bone biopsy done 12/13/24 growing staph aureus (Sens Oxacillin, Penicillin, Bactrim). Blood cultures 12/13 show staph aureus with BCID indicating MSSA. Repeat blood cultures 12/15/24 no growth x2 day. Vanc and Cipro discontinued, and patient started on IV Ancef. TTE 12/15/24 without vegetations. Podiatry discussed R TMA with patient, which patient declined at this time, and plan for conservative management with wound care and antibiotics. U/S with  VASILIY 12/17 shows no hemodynamically significant stenosis with Right and Left VASILIY of 1.3.     Patient continues to be clinically stable. Afebrile, no leukocytosis. CRP downtrending, now 62. Pain well controlled. He has completed IV abx through PICC line before and is comfortable with process.    Recommendations / Plan:  Bone culture 12/13 with Staph Aureus. Follow up bone biopsy pathology   Index blood culture 12/13 with MSSA.  Follow up repeat blood cultures 12/15, no growth x2 day  Continue IV Ancef for MSSA bacteremia and osteo of R foot with staph aureus for a duration of 6 weeks; start date of cleared blood cultures (12/15/2024) and EOC 1/26/2025.  Needs PICC placement   Pt will need home health to draw weekly labs and PICC line dressing changes  Wound care per podiatry  Pt to follow up in ID clinic in about 3 weeks and near end of care. Will attempt to coordinate outpatient ID and podiatry clinic visits together for continuity of care.     -- Discussed with ID staff and primary team.  -- ID will sign off. See OPAT note below.    Outpatient Antibiotic Therapy Plan:    Please send referral to [facility].    1) Infection: MSSA osteomyelitis of R 2nd metatarsal shaft; MSSA bacteremia     2) Discharge Antibiotics:    Intravenous antibiotics:  Cefazolin 6g IV q 24 hours     3) Therapy Duration:  6 weeks    Estimated end date of IV antibiotics: 01/26/2025    4) Outpatient Weekly Labs:    Order the following labs to be drawn on Mondays:   CBC  CMP   CRP    5) Fax Lab Results to Infectious Diseases Provider: Saadia Carlos PA-C    [facility] Fax Number: [phone]    6) Outpatient Infectious Diseases Follow-up    Follow-up appointment will be arranged by the ID clinic and will be found in the patient's appointments tab.    Prior to discharge, please ensure the patient's follow-up has been scheduled.    If there is still no follow-up scheduled prior to discharge, please send an  EPIC message to [facility] [place] or Call Infectious Diseases Dept.                Anticipated Disposition: per primary    Thank you for your consult. I will sign off. Please contact us if you have any additional questions.    Saadia Carlos PA-C  Infectious Disease  [facility] - Med Surg    Subjective:     Principal Problem:Diabetic foot ulcer associated with diabetes mellitus due to underlying condition    HPI: 59 year old male with insulin dependent diabetes, CAD, HLD, with history of prior L foot osteomyelitis s/p left foot hallux amputation, partial left 2nd toe amputation, and right 3rd and 5th toe amputation presented to ER with R foot infection after recent amputation on 12/5/24 with podiatry.    In 9/2024 R foot with new issue ulceration/blister, podiatry was following weekly for debridement and wound care. Cultures 09/12/2024 growing Staph aureus, Serratia marcescens , Citrobacter freundii, and presumptive prevotella/porphyromonas group. Treated with 6 weeks of PO Doxy and Levo. Underwent 2nd metatarsal head resection of right foot on 12/5/24 with podiatry, Dr. Melara. Treated with PO Clindamycin and Cipro following surgery. Presented to ER 12/13 for 4 days of generalized weakness, chills, nausea, cough, decreased appetite, and nasal congestion. Reported fall on 12/10/24 with no head injury or LOC. No trauma to foot. His R foot has been wrapped since surgery, but was unwrapped in ER with redness and purulent drainage from incision site.    On admit, afebrile, otherwise VSS. No leukocytosis. Elevated .2 and ESR 83. Blood cultures 12/13 no growth. MRI R forefoot with cellulitis and high likelihood of osteomyelitis of 2nd metatarsal shaft and 3rd metatarsal shaft. Started on IV Vancomycin and IV Cipro in ER. Podiatry and ID consulted.     Patient reports his foot was not bothering him since surgery, it was mainly his other symptoms of cough, congestion, weakness, and chills. Notes upper  respiratory symptoms are improving. No fevers.     History of R foot with MSSA and pasturella in 01/2023, treated with 6 weeks of ceftriaxone. Bone culture of L 5th digit with MSSA (01/2024) treated with 6 weeks of Doxycycline, and he saw ID at that time. Hx of L foot culture (04/2024) growing Staph aureus, Acinetobacter baumannii/haemolyticus, Bacteroidies thetaiotaomicron, bacteroides stercoris, and presumptive prevotella/porphyromonas group. Hx of L foot culture (06/2024) growing Enterococcusus faecalis, staph aureus, and Prevotella bivia.   Past Medical History:   Diagnosis Date    Allergy     CAD (coronary artery disease), native coronary artery 6/25/2013    Cancer     COVID-19 virus detected 9/1/2020    Diabetes mellitus     Diabetes mellitus, type 2     Disorder of kidney and ureter     Heart attack 04/2012    Hx of colon cancer, stage I     Hyperlipidemia     Hypertension     Muscular pain     post-op after colonoscopy    NSTEMI May 2013 - peak troponin 0.22 5/22/2013    MICHAELA (obstructive sleep apnea)     Retinopathy due to secondary diabetes        Past Surgical History:   Procedure Laterality Date    COLONOSCOPY N/A 2/17/2017    Procedure: COLONOSCOPY;  Surgeon: Simon Gomez MD;  Location: [facility] (4TH FLR);  Service: Endoscopy;  Laterality: N/A;    CORONARY ANGIOPLASTY      EXCISION, TARSAL OR METATARSAL BONE, EXCLUDING TALUS OR CALCANEUS, PARTIAL Right 12/6/2024    Procedure: EXCISION,TARSAL OR METATARSAL BONE,EXCLUDING TALUS OR CALCANEUS,PARTIAL;  Surgeon: Mingo Melara DPM;  Location: [facility];  Service: Podiatry;  Laterality: Right;    INCISION AND DRAINAGE FOOT Right 6/5/2018    Procedure: INCISION AND DRAINAGE, FOOT;  Surgeon: Bridget Santana DPM;  [address];  Service: Podiatry;  Laterality: Right;  Request mini C arm in room. request wound vac with medium black sponge    INCISION AND DRAINAGE FOOT Right 6/7/2018    Procedure: INCISION AND DRAINAGE, FOOT;  Surgeon: Emelia  DAE Brock;  Location: [facility] OR 2ND FLR;  Service: Podiatry;  Laterality: Right;    INCISION AND DRAINAGE FOOT Left 9/4/2020    Procedure: INCISION AND DRAINAGE, FOOT;  Surgeon: Ainsley Powell DPM;  Location: [facility] OR 2ND FLR;  Service: Podiatry;  Laterality: Left;    INCISION AND DRAINAGE FOOT Left 12/22/2020    Procedure: INCISION AND DRAINAGE, FOOT;  Surgeon: Ainsley Powell DPM;  Location: [facility] OR 2ND FLR;  Service: Podiatry;  Laterality: Left;  May need micro choice  Need Jam shidi needles  Stretcher OK      INCISION AND DRAINAGE OF ABSCESS Right 6/2/2018    Procedure: INCISION AND DRAINAGE, ABSCESS;  Surgeon: Bridget Santana DPM;  Location: [facility] OR 2ND FLR;  Service: General;  Laterality: Right;    OSTEOTOMY OF METATARSAL BONE  9/4/2020    Procedure: OSTEOTOMY, METATARSAL BONE, 1st METATARSAL RESECTION;  Surgeon: Ainsley Powell DPM;  Location: [facility] OR 2ND FLR;  Service: Podiatry;;    REMOVAL OF FOREIGN BODY FROM FOOT Right 6/7/2018    Procedure: REMOVAL, FOREIGN BODY, FOOT;  Surgeon: Emelia Brock DPM;  Location: [facility] OR 2ND FLR;  Service: Podiatry;  Laterality: Right;    TOE AMPUTATION Left 7/4/2020    Procedure: AMPUTATION, TOE;  Surgeon: Bridget Santana DPM;  Location: [facility] OR 2ND FLR;  Service: Podiatry;  Laterality: Left;    TOE AMPUTATION Left 10/14/2020    Procedure: AMPUTATION, TOE;  Surgeon: Mingo Melara DPM;  Location: [facility] OR 2ND FLR;  Service: Podiatry;  Laterality: Left;  stretcher OK    TOE AMPUTATION Right 6/9/2022    Procedure: AMPUTATION, TOE - Dr. Melara @ [place] in am;  Surgeon: Mingo Melara DPM;  Location: [facility] OR 1ST FLR;  Service: Podiatry;  Laterality: Right;    TOE AMPUTATION Right 11/23/2022    Procedure: AMPUTATION, TOE;  Surgeon: Hansa Robertson DPM;  Location: [facility] OR 2ND FLR;  Service: Podiatry;  Laterality: Right;    TOE AMPUTATION Right 11/25/2022    Procedure: AMPUTATION, TOE;  Surgeon: Chris Groves DPM;  Location: [facility] OR [address] [place] [address];  Service:  Podiatry;  Laterality: Right;    TONSILLECTOMY         Review of patient's allergies indicates:   Allergen Reactions    Penicillins Other (See Comments)     PCN allergy as a child - was told he went into a coma. Tolerates Cefazolin without adverse reactions    Shellfish containing products      Other reaction(s): Unknown    Vancomycin Itching     Tolerated vancomycin 7/2020    Bactrim  [sulfamethoxazole-trimethoprim] Rash       Medications:  Medications Prior to Admission   Medication Sig    acetaminophen (TYLENOL) 500 MG tablet Take 1,000 mg by mouth daily as needed for Pain.    amLODIPine (NORVASC) 10 MG tablet Take 1 tablet (10 mg total) by mouth once daily.    aspirin (ECOTRIN) 81 MG EC tablet Take 1 tablet (81 mg total) by mouth once daily.    atorvastatin (LIPITOR) 80 MG tablet Take 1 tablet (80 mg total) by mouth every evening.    atorvastatin (LIPITOR) 80 MG tablet Take 80 mg by mouth.    bismuth subsalicylate (PEPTO BISMOL) 262 mg/15 mL suspension Take 15 mLs by mouth daily as needed (diarrhea).    blood sugar diagnostic (ACCU-CHEK GUIDE TEST STRIPS) Strp 1 each by Misc.(Non-Drug; Combo Route) route 5 (five) times daily.    blood-glucose sensor Haley Inject 1 each into the skin every 7 days.    carvediloL (COREG) 12.5 MG tablet Take 0.5 tablets (6.25 mg total) by mouth 2 (two) times daily with meals.    carvediloL (COREG) 12.5 MG tablet Take 12.5 mg by mouth.    ciprofloxacin HCl (CIPRO) 500 MG tablet Take 1 tablet (500 mg total) by mouth every 12 (twelve) hours.    clindamycin (CLEOCIN) 300 MG capsule Take 1 capsule (300 mg total) by mouth every 6 (six) hours.    doxycycline (VIBRAMYCIN) 100 MG Cap Take 1 capsule (100 mg total) by mouth 2 (two) times daily.    insulin aspart, niacinamide, (FIASP U-100 INSULIN) 100 unit/mL Soln Inject 100 Units into the skin continuous. Gives via insulin pump up to 100 units daily. Do not directly inject. Uses as directed. Rate is 1.75 units/hour - carb counts as well for  "meal time boluses.    insulin lispro 100 unit/mL pen Inject into the skin.    insulin NPH/Reg human (NOVOLIN 70-30 FLEXPEN U-100) 100 unit/mL (70-30) InPn pen Inject 36 units before breakfast and 30 units before dinner    insulin regular 100 unit/mL Inj injection Inject 100 Units into the skin once daily. Inject up to 100u via insulin pump daily    lancets (ACCU-CHEK FASTCLIX LANCET DRUM) Misc 1 each by Misc.(Non-Drug; Combo Route) route Daily.    levoFLOXacin (LEVAQUIN) 750 MG tablet Take 1 tablet (750 mg total) by mouth once daily.    lisinopriL (PRINIVIL,ZESTRIL) 40 MG tablet Take 1 tablet (40 mg total) by mouth once daily.    metFORMIN (GLUCOPHAGE) 1000 MG tablet Take 1 tablet (1,000 mg total) by mouth 2 (two) times daily with meals.    multivit-min/folic/vit K/lycop (MEN'S MULTIVITAMIN ORAL) Take 1 tablet by mouth once daily.    multivitamin (MULTIPLE VITAMIN ORAL) Take 1 capsule by mouth.    oxyCODONE-acetaminophen (PERCOCET) 7.5-325 mg per tablet Take 1 tablet by mouth every 6 (six) hours as needed for Pain. (Patient not taking: Reported on 12/9/2024)    pen needle, diabetic 31 gauge x 3/16" Ndle Inject 1 each into the skin 4 (four) times daily.    semaglutide (OZEMPIC) 0.25 mg or 0.5 mg (2 mg/3 mL) pen injector Inject 0.5 mg into the skin every 7 days.    semaglutide (OZEMPIC) 0.25 mg or 0.5 mg (2 mg/3 mL) pen injector Inject 0.5 mg into the skin.    subcutaneous insulin pump (MINIMED 780G INSULIN PUMP) Misc 1 each by Misc.(Non-Drug; Combo Route) route once daily.     Antibiotics (From admission, onward)      Start     Stop Route Frequency Ordered    12/15/24 1700  ceFAZolin 2 g         -- IV Every 8 hours (non-standard times) 12/15/24 1558          Antifungals (From admission, onward)      None          Antivirals (From admission, onward)      None             Immunization History   Administered Date(s) Administered    COVID-19, MRNA, LN-S, PF (MODERNA FULL 0.5 ML DOSE) 03/30/2021, 04/28/2021    Influenza " 01/18/2019    Influenza - Intradermal - Quadrivalent - PF 10/24/2013    Influenza - Quadrivalent - PF *Preferred* (6 months and older) 11/08/2010, 09/28/2020, 12/07/2022    Pneumococcal Conjugate - 13 Valent 09/28/2020    Pneumococcal Polysaccharide - 23 Valent 06/29/2017    Tdap 09/08/2015    Zoster Recombinant 09/28/2020       Family History       Problem Relation (Age of Onset)    Diabetes Mother, Father, Maternal Grandmother, Maternal Grandfather, Paternal Grandmother, Paternal Grandfather    Heart disease Mother, Brother          Social History     Socioeconomic History    Marital status:    Tobacco Use    Smoking status: Never     Passive exposure: Never    Smokeless tobacco: Never   Substance and Sexual Activity    Alcohol use: Yes     Comment: rare x1 beer-     Drug use: No    Sexual activity: Not Currently     Social Drivers of Health     Financial Resource Strain: Low Risk  (12/13/2024)    Overall Financial Resource Strain (CARDIA)     Difficulty of Paying Living Expenses: Not hard at all   Food Insecurity: No Food Insecurity (12/13/2024)    Hunger Vital Sign     Worried About Running Out of Food in the Last Year: Never true     Ran Out of Food in the Last Year: Never true   Transportation Needs: No Transportation Needs (12/13/2024)    TRANSPORTATION NEEDS     Transportation : No   Physical Activity: Inactive (12/13/2024)    Exercise Vital Sign     Days of Exercise per Week: 0 days     Minutes of Exercise per Session: 0 min   Stress: No Stress Concern Present (12/13/2024)    [employer] - Occupational Stress Questionnaire     Feeling of Stress : Only a little   Housing Stability: Low Risk  (12/13/2024)    Housing Stability Vital Sign     Unable to Pay for Housing in the Last Year: No     Homeless in the Last Year: No     Review of Systems   Constitutional:  Negative for chills, diaphoresis and fever.   Respiratory:  Negative for shortness of breath.    Cardiovascular:   Negative for chest pain.   Gastrointestinal:  Negative for abdominal pain, diarrhea, nausea and vomiting.   Genitourinary:  Negative for dysuria and hematuria.   Skin:  Positive for color change and wound.   All other systems reviewed and are negative.    Objective:     Vital Signs (Most Recent):  Temp: 98.2 °F (36.8 °C) (12/17/24 1220)  Pulse: (!) 49 (12/17/24 1220)  Resp: 18 (12/17/24 1220)  BP: 135/87 (12/17/24 1220)  SpO2: 95 % (12/17/24 1220) Vital Signs (24h Range):  Temp:  [97.3 °F (36.3 °C)-98.2 °F (36.8 °C)] 98.2 °F (36.8 °C)  Pulse:  [48-68] 49  Resp:  [18-20] 18  SpO2:  [93 %-97 %] 95 %  BP: (120-148)/(73-87) 135/87     Weight: 126.6 kg (279 lb)  Body mass index is 35.82 kg/m².    Estimated Creatinine Clearance: 101 mL/min (based on SCr of 1.1 mg/dL).     Physical Exam  Vitals and nursing note reviewed.   Constitutional:       General: He is not in acute distress.     Appearance: Normal appearance. He is not ill-appearing, toxic-appearing or diaphoretic.   HENT:      Head: Normocephalic and atraumatic.      Nose: Nose normal.      Mouth/Throat:      Mouth: Mucous membranes are moist.   Eyes:      Conjunctiva/sclera: Conjunctivae normal.   Cardiovascular:      Rate and Rhythm: Normal rate and regular rhythm.      Pulses: Normal pulses.      Heart sounds: No murmur heard.  Pulmonary:      Effort: Pulmonary effort is normal. No respiratory distress.   Abdominal:      Palpations: Abdomen is soft.      Tenderness: There is no abdominal tenderness.   Musculoskeletal:      Right lower leg: Edema present.      Left lower leg: Edema present.   Skin:     Findings: Erythema present.      Comments: R foot bandaged. Erythema improving in RLE.    Chronic skin/color changes to bilateral lower extremities.     Healing laceration to Right 2nd digit with some erythema, minimal swelling, no warmth or fluctuance to finger. See photo in media.   Neurological:      Mental Status: He is alert and oriented to person, place, and  time.   Psychiatric:         Mood and Affect: Mood normal.          Significant Labs: Blood Culture:   Recent Labs   Lab 12/13/24  0100 12/13/24  0125 12/15/24  1130 12/15/24  1213   LABBLOO No Growth to date  No Growth to date  No Growth to date  No Growth to date  No Growth to date Gram stain aer bottle: Gram positive cocci in clusters resembling Staph  Results called to and read back by: Adeline Ferreira RN 12/15/2024  07:17  STAPHYLOCOCCUS AUREUS  Susceptibility pending  ID consult required at [facility].  * No Growth to date  No Growth to date No Growth to date  No Growth to date       Microbiology Results (last 7 days)       Procedure Component Value Units Date/Time    Blood culture [[id number]]  (Abnormal) Collected: 12/13/24 0125    Order Status: Completed Specimen: Blood from Peripheral, Hand, Right Updated: 12/17/24 0953     Blood Culture, Routine Gram stain aer bottle: Gram positive cocci in clusters resembling Staph      Results called to and read back by: Adeline Ferreira RN 12/15/2024      07:17      STAPHYLOCOCCUS AUREUS  Susceptibility pending  ID consult required at [facility] locations.      Culture, Anaerobe [[id number]] Collected: 12/13/24 1012    Order Status: Completed Specimen: Bone from Foot, Right Updated: 12/17/24 0858     Anaerobic Culture No anaerobes isolated    Blood culture [[id number]] Collected: 12/13/24 0100    Order Status: Completed Specimen: Blood from Peripheral, Forearm, Left Updated: 12/17/24 0613     Blood Culture, Routine No Growth to date      No Growth to date      No Growth to date      No Growth to date      No Growth to date    Aerobic culture [[id number]]  (Abnormal)  (Susceptibility) Collected: 12/13/24 1012    Order Status: Completed Specimen: Bone from Foot, Right Updated: 12/16/24 1427     Aerobic Bacterial Culture STAPHYLOCOCCUS AUREUS  Moderate      Blood culture [[id number]] Collected: 12/15/24  "1213    Order Status: Completed Specimen: Blood Updated: 12/16/24 1412     Blood Culture, Routine No Growth to date      No Growth to date    Blood culture [[id number]] Collected: 12/15/24 1130    Order Status: Completed Specimen: Blood Updated: 12/16/24 1412     Blood Culture, Routine No Growth to date      No Growth to date    Narrative:      From 2 different sites 30 minutes apart    MRSA/SA Rapid ID by PCR from Blood culture [[id number]]  (Abnormal) Collected: 12/13/24 0125    Order Status: Completed Updated: 12/15/24 0921     Staph aureus ID by PCR Positive     Methicillin Resistant ID by PCR Negative    Gram stain [[id number]] Collected: 12/13/24 1012    Order Status: Completed Specimen: Bone from Foot, Right Updated: 12/13/24 1417     Gram Stain Result No WBC's      No organisms seen    Blood culture [[id number]]     Order Status: Canceled Specimen: Blood     Blood culture [[id number]]     Order Status: Canceled Specimen: Blood     Influenza A & B by Molecular [[id number]] Collected: 12/12/24 1612    Order Status: Completed Specimen: Nasopharyngeal Swab Updated: 12/12/24 1730     Influenza A, Molecular Negative     Influenza B, Molecular Negative     Flu A & B Source Nasal swab          Urine Culture: No results for input(s): "LABURIN" in the last 4320 hours.  Urine Studies:   Recent Labs   Lab 12/12/24  1650   COLORU Yellow   APPEARANCEUA Clear   PHUR 6.0   SPECGRAV 1.030   PROTEINUA 2+*   GLUCUA 4+*   KETONESU Trace*   BILIRUBINUA Negative   OCCULTUA Trace*   NITRITE Negative   LEUKOCYTESUR Negative   RBCUA 4   WBCUA 1   BACTERIA Occasional   SQUAMEPITHEL 0   HYALINECASTS 26*     Wound Culture:   Recent Labs   Lab 09/12/24  1239 12/13/24  1012   LABAERO STAPHYLOCOCCUS AUREUS  Many  *  SERRATIA MARCESCENS   Few  *  CITROBACTER FREUNDII  Rare  Skin josselin also present  * STAPHYLOCOCCUS AUREUS  Moderate  *       Significant Imaging: I have reviewed all pertinent imaging results/findings within the past 24 " hours.

## 2024-12-17 NOTE — PLAN OF CARE
"[facility] Plan of Care Note    Dx:   Cough [R05.9]  Syncope [R55]  Osteomyelitis [M86.9]  Soft tissue infection [L08.9]  Chest pain [R07.9]    Shift Events: Ultrasound    Goals of Care: Wound healing    Neuro: A&O x4    Vital Signs: BP (!) 148/76 (BP Location: Left arm, Patient Position: Sitting)   Pulse (!) 58   Temp 97.3 °F (36.3 °C) (Oral)   Resp 18   Ht 6' 2" (1.88 m)   Wt 126.6 kg (279 lb)   SpO2 97%   BMI 35.82 kg/m²     Respiratory: room air    Diet: Diet diabetic 2000 Calories (up to 75 gm per meal)      Is patient tolerating current diet? yes    GTTS: none    Urine Output/Bowel Movement:   I/O this shift:  In: 411.8 [P.O.:380; I.V.:31.8]  Out: 800 [Urine:800]  Last Bowel Movement: 12/14/24      Drains/Tubes/Tube Feeds (include total output/shift):   I/O this shift:  In: 411.8 [P.O.:380; I.V.:31.8]  Out: 800 [Urine:800]      Lines: 20G left  forearm      Accuchecks: ACHS    Skin: localized abnormality; Right dorsal foot incision/wound; bi-lat toe amputation    Fall Risk Score: 15    Activity level? independent    Any scheduled procedures? none    Any safety concerns? Fall risk    Other:     "

## 2024-12-17 NOTE — PROCEDURES
"Billy Rivera is a 59 y.o. male patient.    Temp: 98.2 °F (36.8 °C) (12/17/24 1220)  Pulse: (!) 49 (12/17/24 1220)  Resp: 18 (12/17/24 1220)  BP: 135/87 (12/17/24 1220)  SpO2: 95 % (12/17/24 1220)  Weight: 126.6 kg (279 lb) (12/16/24 0741)  Height: 6' 2" (188 cm) (12/16/24 0741)    PICC  Date/Time: 12/17/2024 4:43 PM  Performed by: Britton Velasco RN  Assisting provider: Magali Gonzalez RN  Consent Done: Yes  Time out: Immediately prior to procedure a time out was called to verify the correct patient, procedure, equipment, support staff and site/side marked as required  Indications: med administration  Anesthesia: local infiltration  Local anesthetic: lidocaine 1% without epinephrine  Anesthetic Total (mL): 3  Description of findings: PICC placement  Preparation: skin prepped with ChloraPrep  Skin prep agent dried: skin prep agent completely dried prior to procedure  Sterile barriers: all five maximum sterile barriers used - cap, mask, sterile gown, sterile gloves, and large sterile sheet  Hand hygiene: hand hygiene performed prior to central venous catheter insertion  Location details: right basilic  Catheter type: double lumen  Catheter size: 5 Fr  Catheter Length: 40cm    Ultrasound guidance: yes  Vessel Caliber: large and patent, compressibility normal  Needle advanced into vessel with real time Ultrasound guidance.  Guidewire confirmed in vessel.  Image recorded and saved.  Sterile sheath used.  Number of attempts: 1  Post-procedure: blood return through all ports, chlorhexidine patch and sterile dressing applied  Technical procedures used: 3CG  Estimated blood loss (mL): 0  Specimens: No    Assessment: placement verified by x-ray  Complications: none          Name KARLEY Malone RN  12/17/2024    "

## 2024-12-18 ENCOUNTER — TELEPHONE (OUTPATIENT)
Dept: PODIATRY | Facility: CLINIC | Age: 59
End: 2024-12-18
Payer: COMMERCIAL

## 2024-12-18 VITALS
HEIGHT: 74 IN | DIASTOLIC BLOOD PRESSURE: 84 MMHG | SYSTOLIC BLOOD PRESSURE: 154 MMHG | RESPIRATION RATE: 18 BRPM | WEIGHT: 279 LBS | TEMPERATURE: 98 F | BODY MASS INDEX: 35.81 KG/M2 | OXYGEN SATURATION: 96 % | HEART RATE: 60 BPM

## 2024-12-18 LAB
BACTERIA BLD CULT: NORMAL
POCT GLUCOSE: 237 MG/DL (ref 70–110)
POCT GLUCOSE: 248 MG/DL (ref 70–110)

## 2024-12-18 PROCEDURE — 25000003 PHARM REV CODE 250: Performed by: INTERNAL MEDICINE

## 2024-12-18 PROCEDURE — 63600175 PHARM REV CODE 636 W HCPCS: Performed by: PHYSICIAN ASSISTANT

## 2024-12-18 PROCEDURE — 99024 POSTOP FOLLOW-UP VISIT: CPT | Mod: ,,, | Performed by: PODIATRIST

## 2024-12-18 PROCEDURE — 25000003 PHARM REV CODE 250

## 2024-12-18 RX ORDER — MUPIROCIN 20 MG/G
OINTMENT TOPICAL 2 TIMES DAILY
Status: DISCONTINUED | OUTPATIENT
Start: 2024-12-18 | End: 2024-12-18 | Stop reason: HOSPADM

## 2024-12-18 RX ADMIN — INSULIN ASPART 2 UNITS: 100 INJECTION, SOLUTION INTRAVENOUS; SUBCUTANEOUS at 01:12

## 2024-12-18 RX ADMIN — INSULIN ASPART 12 UNITS: 100 INJECTION, SOLUTION INTRAVENOUS; SUBCUTANEOUS at 08:12

## 2024-12-18 RX ADMIN — INSULIN ASPART 12 UNITS: 100 INJECTION, SOLUTION INTRAVENOUS; SUBCUTANEOUS at 01:12

## 2024-12-18 RX ADMIN — INSULIN GLARGINE 35 UNITS: 100 INJECTION, SOLUTION SUBCUTANEOUS at 09:12

## 2024-12-18 RX ADMIN — AMLODIPINE BESYLATE 10 MG: 10 TABLET ORAL at 09:12

## 2024-12-18 RX ADMIN — CEFAZOLIN 2 G: 2 INJECTION, POWDER, FOR SOLUTION INTRAMUSCULAR; INTRAVENOUS at 09:12

## 2024-12-18 RX ADMIN — INSULIN ASPART 2 UNITS: 100 INJECTION, SOLUTION INTRAVENOUS; SUBCUTANEOUS at 09:12

## 2024-12-18 RX ADMIN — CEFAZOLIN 2 G: 2 INJECTION, POWDER, FOR SOLUTION INTRAMUSCULAR; INTRAVENOUS at 01:12

## 2024-12-18 RX ADMIN — MUPIROCIN: 20 OINTMENT TOPICAL at 10:12

## 2024-12-18 NOTE — TELEPHONE ENCOUNTER
Spoke with patient in regards to message sent, informed him that on call staff will come and see him while in hospital, patient verbally understood telephone encounter        ----- Message from Jimenez sent at 12/18/2024  8:03 AM CST -----  Regarding: Consult/Advisory  Contact: [phone]  Consult/Advisory     Name Of Caller: Pt      Contact Preference:      [phone]     Nature of call: Pt is currently admitted on the 6th floor would like to know if he has to come down for his appt or if Dr. Saunders will come to the 6th floor. Please call to advise thank you

## 2024-12-18 NOTE — PLAN OF CARE
[facility]  Discharge Final Note    Primary Care Provider: Augie Ruiz MD    Expected Discharge Date: 12/18/2024    Future Appointments   Date Time Provider Department Center   12/26/2024  1:00 PM Josiah Saunders DPM [facility] POD [address]   12/27/2024  9:30 AM Peterson Rivera FNP-C [facility] IM [place]   1/2/2025 11:30 AM Augie Ruiz MD [facility] IM [place]   1/8/2025  9:00 AM Cielo Mulligan PA-C [facility] ID [address]   1/9/2025  8:00 AM Mingo Melara DPM [facility] POD [address]   1/24/2025 11:00 AM Saadia Carlos PA-C [facility] ID [address]   3/13/2025  2:30 PM Yunier Avendaño PA-C [facility] DERM [address]   3/25/2025  9:00 AM Chandrika Boogie, AMY [facility] ENDODIA [address]         Final Discharge Note (most recent)       Final Note - 12/18/24 1554          Final Note    Assessment Type Final Discharge Note     Anticipated Discharge Disposition Home or Self Care     What phone number can be called within the next 1-3 days to see how you are doing after discharge? [phone]        Post-Acute Status    Post-Acute Authorization Home Health;IV Infusion     Home Health Status Set-up Complete/Auth obtained   [facility]     Coverage BLUE CROSS BLUE SHIELD - BCBS BLUE SAVER PPO - HD     IV Infusion Status Set-up Complete/Auth obtained   [facility]    Discharge Delays None known at this time                 Pt discharge today with [facility] (will start on Mon 12/23 vs. 12/24; SW reached out to pt MD and MD confirmed start date is fine). SW was informed that [facility] has did teaching with pt and IV atb and supplies was delivered to pt at bedside per Jhoan). [facility] was reached out regarding scheduling PCP f/u. Pt aware of f/u appts and pt informed SW that his wife will be providing him transportation home.     [address]   [facility]    Case Management Dept  [phone]      Important Message from Medicare             Contact Augie Zazueta MD    Specialty: Family Medicine   Relationship: PCP - General    [address] [facility] [address]   Phone: [phone]       Next Steps: Schedule an appointment as soon as possible for a visit    [facility] - Infectious Disease [facility]   Specialty: Infectious Diseases    [address]  [place] [address]   Phone: [phone]       Next Steps: Schedule an appointment as soon as possible for a visit    [id number]   Specialty: Podiatry    [address]  [place] [address]   Phone: [phone]       Next Steps: Schedule an appointment as soon as possible for a visit

## 2024-12-18 NOTE — SUBJECTIVE & OBJECTIVE
Subjective:     Interval History: NAEON, NAD, VSS. Patient is afebrile, dressings are clean/dry/ and intact.   New pedal complaints: None  New results:   Denies post op fever.          Scheduled Meds:   amLODIPine  10 mg Oral Daily    atorvastatin  80 mg Oral QHS    ceFAZolin (Ancef) IV (PEDS and ADULTS)  2 g Intravenous Q8H    enoxparin  40 mg Subcutaneous Daily    insulin aspart U-100  12 Units Subcutaneous TIDWM    insulin glargine U-100  35 Units Subcutaneous BID    mupirocin   Nasal BID     Continuous Infusions:  PRN Meds:  Current Facility-Administered Medications:     acetaminophen, 650 mg, Oral, Q4H PRN    albuterol-ipratropium, 3 mL, Nebulization, Q4H PRN    bisacodyL, 10 mg, Rectal, Daily PRN    dextrose 10%, 12.5 g, Intravenous, PRN    dextrose 10%, 25 g, Intravenous, PRN    diphenhydrAMINE, 25 mg, Intravenous, Q12H PRN    glucagon (human recombinant), 1 mg, Intramuscular, PRN    glucose, 16 g, Oral, PRN    glucose, 24 g, Oral, PRN    insulin aspart U-100, 0-5 Units, Subcutaneous, QID (AC + HS) PRN    melatonin, 6 mg, Oral, Nightly PRN    polyethylene glycol, 17 g, Oral, Daily PRN    Flushing PICC/Midline Protocol, , , Until Discontinued **AND** sodium chloride 0.9%, 10 mL, Intravenous, Q12H PRN    Review of Systems   Constitutional:  Negative for chills and fever.   Respiratory:  Negative for shortness of breath.    Cardiovascular:  Negative for chest pain.   Gastrointestinal:  Negative for diarrhea, nausea and vomiting.   Skin:  Positive for wound.     Objective:     Vital Signs (Most Recent):  Temp: 97.9 °F (36.6 °C) (12/18/24 1154)  Pulse: 60 (12/18/24 1154)  Resp: 18 (12/18/24 1154)  BP: (!) 154/84 (12/18/24 1154)  SpO2: 96 % (12/18/24 1154) Vital Signs (24h Range):  Temp:  [97.4 °F (36.3 °C)-98.4 °F (36.9 °C)] 97.9 °F (36.6 °C)  Pulse:  [53-72] 60  Resp:  [18-20] 18  SpO2:  [93 %-96 %] 96 %  BP: (134-160)/(64-88) 154/84     Weight: 126.6 kg (279 lb)  Body mass index is 35.82 kg/m².    Foot  Exam    General  Orientation: alert and oriented to person, place, and time       Right Foot/Ankle     Inspection and Palpation  Tenderness: none   Swelling: (3rd toe)  Skin Exam: callus, drainage, dry skin, skin changes, abnormal color and ulcer; skin not intact, no cellulitis and no erythema     Neurovascular  Dorsalis pedis: 1+  Posterior tibial: doppler  Saphenous nerve sensation: absent  Tibial nerve sensation: absent  Superficial peroneal nerve sensation: absent  Deep peroneal nerve sensation: absent  Sural nerve sensation: absent    Comments  Partial 3rd and 5th ray amputated. Partial 2nd metatarsal amputated with digit left intact. Incision site at dorsal 2nd metatarsal head noted to contain wound with fibrous base, continued purulence and slough upon manual expression. Wound probes to bone and is Non tender to palpation.  Negligible malodor.      Left Foot/Ankle      Inspection and Palpation  Tenderness: none   Swelling: none   Skin Exam: callus and dry skin; no drainage, no cellulitis, no ulcer and no erythema     Neurovascular  Dorsalis pedis: 1+  Posterior tibial: doppler  Saphenous nerve sensation: absent  Tibial nerve sensation: absent  Superficial peroneal nerve sensation: absent  Deep peroneal nerve sensation: absent  Sural nerve sensation: absent    Comments  Partial 1st ray amputated, stable and healed.  Scab located at dorsal 3rd digit, plantar right foot, as well as erythema on anterior leg; otherwise no other discernible wounds in left lower extremity      Laboratory:  A1C:   Recent Labs   Lab 11/27/24  0739   HGBA1C 10.2*     CBC:   Recent Labs   Lab 12/15/24  0329   WBC 5.87   RBC 4.04*   HGB 11.7*   HCT 34.3*      MCV 85   MCH 29.0   MCHC 34.1     CMP:   Recent Labs   Lab 12/12/24  1609 12/13/24  0603 12/15/24  0329   *   < > 175*   CALCIUM 8.6*   < > 8.2*   ALBUMIN 2.6*  --   --    PROT 7.5  --   --    *   < > 133*   K 4.9   < > 3.6   CO2 22*   < > 25   CL 96   < > 100    BUN 19   < > 12   CREATININE 1.4   < > 1.1   ALKPHOS 90  --   --    ALT 21  --   --    AST 23  --   --    BILITOT 0.7  --   --     < > = values in this interval not displayed.     CRP:   Recent Labs   Lab 12/15/24  0329   CRP 62.4*     ESR:   Recent Labs   Lab 12/12/24  2112   SEDRATE 83*     Wound Cultures:   Recent Labs   Lab 09/12/24  1239 12/13/24  1012   LABAERO STAPHYLOCOCCUS AUREUS  Many  *  SERRATIA MARCESCENS   Few  *  CITROBACTER FREUNDII  Rare  Skin josselin also present  * STAPHYLOCOCCUS AUREUS  Moderate  *     Microbiology Results (last 7 days)       Procedure Component Value Units Date/Time    Blood culture [[id number]]  (Abnormal) Collected: 12/13/24 0125    Order Status: Completed Specimen: Blood from Peripheral, Hand, Right Updated: 12/18/24 0724     Blood Culture, Routine Gram stain aer bottle: Gram positive cocci in clusters resembling Staph      Results called to and read back by: Adeline Ferreira RN 12/15/2024      07:17      STAPHYLOCOCCUS AUREUS  Susceptibility pending  ID consult required at [facility] and [facility] locations.      Blood culture [[id number]] Collected: 12/13/24 0100    Order Status: Completed Specimen: Blood from Peripheral, Forearm, Left Updated: 12/18/24 0612     Blood Culture, Routine No growth after 5 days.    Blood culture [[id number]] Collected: 12/15/24 1213    Order Status: Completed Specimen: Blood Updated: 12/17/24 1412     Blood Culture, Routine No Growth to date      No Growth to date      No Growth to date    Blood culture [[id number]] Collected: 12/15/24 1130    Order Status: Completed Specimen: Blood Updated: 12/17/24 1412     Blood Culture, Routine No Growth to date      No Growth to date      No Growth to date    Narrative:      From 2 different sites 30 minutes apart    Culture, Anaerobe [[id number]] Collected: 12/13/24 1012    Order Status: Completed Specimen: Bone from Foot, Right Updated: 12/17/24 0858     Anaerobic Culture No anaerobes  isolated    Aerobic culture [[id number]]  (Abnormal)  (Susceptibility) Collected: 12/13/24 1012    Order Status: Completed Specimen: Bone from Foot, Right Updated: 12/16/24 1427     Aerobic Bacterial Culture STAPHYLOCOCCUS AUREUS  Moderate      MRSA/SA Rapid ID by PCR from Blood culture [[id number]]  (Abnormal) Collected: 12/13/24 0125    Order Status: Completed Updated: 12/15/24 0921     Staph aureus ID by PCR Positive     Methicillin Resistant ID by PCR Negative    Gram stain [[id number]] Collected: 12/13/24 1012    Order Status: Completed Specimen: Bone from Foot, Right Updated: 12/13/24 1417     Gram Stain Result No WBC's      No organisms seen    Blood culture [[id number]]     Order Status: Canceled Specimen: Blood     Blood culture [[id number]]     Order Status: Canceled Specimen: Blood     Influenza A & B by Molecular [[id number]] Collected: 12/12/24 1612    Order Status: Completed Specimen: Nasopharyngeal Swab Updated: 12/12/24 1730     Influenza A, Molecular Negative     Influenza B, Molecular Negative     Flu A & B Source Nasal swab          Specimen (24h ago, onward)      None            Diagnostic Results:  I have reviewed all pertinent imaging results/findings within the past 24 hours.

## 2024-12-18 NOTE — NURSING
Peripheral IV removed. PICC line remains in place and home infusion nurse hooked pt up to bulb antibiotics. Pt was set up with wound care supplies as per podiatry/home health nurse. Pt has left the unit with wife and belongings.

## 2024-12-18 NOTE — PROGRESS NOTES
[facility] Surg  Podiatry  Progress Note    Patient Name: Billy Sheffield Miguel  MRN: [id number]  Admission Date: 12/12/2024  Hospital Length of Stay: 6 days  Attending Physician: Katerine Tomlin MD  Primary Care Provider: Augie Ruiz MD     Subjective:     Interval History: NAEON, NAD, VSS. Patient is afebrile, dressings are clean/dry/ and intact.   New pedal complaints: None  New results:   Denies post op fever.          Scheduled Meds:   amLODIPine  10 mg Oral Daily    atorvastatin  80 mg Oral QHS    ceFAZolin (Ancef) IV (PEDS and ADULTS)  2 g Intravenous Q8H    enoxparin  40 mg Subcutaneous Daily    insulin aspart U-100  12 Units Subcutaneous TIDWM    insulin glargine U-100  35 Units Subcutaneous BID    mupirocin   Nasal BID     Continuous Infusions:  PRN Meds:  Current Facility-Administered Medications:     acetaminophen, 650 mg, Oral, Q4H PRN    albuterol-ipratropium, 3 mL, Nebulization, Q4H PRN    bisacodyL, 10 mg, Rectal, Daily PRN    dextrose 10%, 12.5 g, Intravenous, PRN    dextrose 10%, 25 g, Intravenous, PRN    diphenhydrAMINE, 25 mg, Intravenous, Q12H PRN    glucagon (human recombinant), 1 mg, Intramuscular, PRN    glucose, 16 g, Oral, PRN    glucose, 24 g, Oral, PRN    insulin aspart U-100, 0-5 Units, Subcutaneous, QID (AC + HS) PRN    melatonin, 6 mg, Oral, Nightly PRN    polyethylene glycol, 17 g, Oral, Daily PRN    Flushing PICC/Midline Protocol, , , Until Discontinued **AND** sodium chloride 0.9%, 10 mL, Intravenous, Q12H PRN    Review of Systems   Constitutional:  Negative for chills and fever.   Respiratory:  Negative for shortness of breath.    Cardiovascular:  Negative for chest pain.   Gastrointestinal:  Negative for diarrhea, nausea and vomiting.   Skin:  Positive for wound.     Objective:     Vital Signs (Most Recent):  Temp: 97.9 °F (36.6 °C) (12/18/24 1154)  Pulse: 60 (12/18/24 1154)  Resp: 18 (12/18/24 1154)  BP: (!) 154/84 (12/18/24 1154)  SpO2: 96 % (12/18/24 1154) Vital Signs  (24h Range):  Temp:  [97.4 °F (36.3 °C)-98.4 °F (36.9 °C)] 97.9 °F (36.6 °C)  Pulse:  [53-72] 60  Resp:  [18-20] 18  SpO2:  [93 %-96 %] 96 %  BP: (134-160)/(64-88) 154/84     Weight: 126.6 kg (279 lb)  Body mass index is 35.82 kg/m².    Foot Exam    General  Orientation: alert and oriented to person, place, and time       Right Foot/Ankle     Inspection and Palpation  Tenderness: none   Swelling: (3rd toe)  Skin Exam: callus, drainage, dry skin, skin changes, abnormal color and ulcer; skin not intact, no cellulitis and no erythema     Neurovascular  Dorsalis pedis: 1+  Posterior tibial: doppler  Saphenous nerve sensation: absent  Tibial nerve sensation: absent  Superficial peroneal nerve sensation: absent  Deep peroneal nerve sensation: absent  Sural nerve sensation: absent    Comments  Partial 3rd and 5th ray amputated. Partial 2nd metatarsal amputated with digit left intact. Incision site at dorsal 2nd metatarsal head noted to contain wound with fibrous base, continued purulence and slough upon manual expression. Wound probes to bone and is Non tender to palpation.  Negligible malodor.      Left Foot/Ankle      Inspection and Palpation  Tenderness: none   Swelling: none   Skin Exam: callus and dry skin; no drainage, no cellulitis, no ulcer and no erythema     Neurovascular  Dorsalis pedis: 1+  Posterior tibial: doppler  Saphenous nerve sensation: absent  Tibial nerve sensation: absent  Superficial peroneal nerve sensation: absent  Deep peroneal nerve sensation: absent  Sural nerve sensation: absent    Comments  Partial 1st ray amputated, stable and healed.  Scab located at dorsal 3rd digit, plantar right foot, as well as erythema on anterior leg; otherwise no other discernible wounds in left lower extremity      Laboratory:  A1C:   Recent Labs   Lab 11/27/24  0739   HGBA1C 10.2*     CBC:   Recent Labs   Lab 12/15/24  0329   WBC 5.87   RBC 4.04*   HGB 11.7*   HCT 34.3*      MCV 85   MCH 29.0   MCHC 34.1      CMP:   Recent Labs   Lab 12/12/24  1609 12/13/24  0603 12/15/24  0329   *   < > 175*   CALCIUM 8.6*   < > 8.2*   ALBUMIN 2.6*  --   --    PROT 7.5  --   --    *   < > 133*   K 4.9   < > 3.6   CO2 22*   < > 25   CL 96   < > 100   BUN 19   < > 12   CREATININE 1.4   < > 1.1   ALKPHOS 90  --   --    ALT 21  --   --    AST 23  --   --    BILITOT 0.7  --   --     < > = values in this interval not displayed.     CRP:   Recent Labs   Lab 12/15/24  0329   CRP 62.4*     ESR:   Recent Labs   Lab 12/12/24  2112   SEDRATE 83*     Wound Cultures:   Recent Labs   Lab 09/12/24  1239 12/13/24  1012   LABAERO STAPHYLOCOCCUS AUREUS  Many  *  SERRATIA MARCESCENS   Few  *  CITROBACTER FREUNDII  Rare  Skin josselin also present  * STAPHYLOCOCCUS AUREUS  Moderate  *     Microbiology Results (last 7 days)       Procedure Component Value Units Date/Time    Blood culture [[id number]]  (Abnormal) Collected: 12/13/24 0125    Order Status: Completed Specimen: Blood from Peripheral, Hand, Right Updated: 12/18/24 0724     Blood Culture, Routine Gram stain aer bottle: Gram positive cocci in clusters resembling Staph      Results called to and read back by: Adeline Ferreira RN 12/15/2024      07:17      STAPHYLOCOCCUS AUREUS  Susceptibility pending  ID consult required at [facility],[place] and [facility] locations.      Blood culture [[id number]] Collected: 12/13/24 0100    Order Status: Completed Specimen: Blood from Peripheral, Forearm, Left Updated: 12/18/24 0612     Blood Culture, Routine No growth after 5 days.    Blood culture [[id number]] Collected: 12/15/24 1213    Order Status: Completed Specimen: Blood Updated: 12/17/24 1412     Blood Culture, Routine No Growth to date      No Growth to date      No Growth to date    Blood culture [[id number]] Collected: 12/15/24 1130    Order Status: Completed Specimen: Blood Updated: 12/17/24 1412     Blood Culture, Routine No Growth to date      No Growth to date      No Growth to  date    Narrative:      From 2 different sites 30 minutes apart    Culture, Anaerobe [[id number]] Collected: 12/13/24 1012    Order Status: Completed Specimen: Bone from Foot, Right Updated: 12/17/24 0858     Anaerobic Culture No anaerobes isolated    Aerobic culture [[id number]]  (Abnormal)  (Susceptibility) Collected: 12/13/24 1012    Order Status: Completed Specimen: Bone from Foot, Right Updated: 12/16/24 1427     Aerobic Bacterial Culture STAPHYLOCOCCUS AUREUS  Moderate      MRSA/SA Rapid ID by PCR from Blood culture [[id number]]  (Abnormal) Collected: 12/13/24 0125    Order Status: Completed Updated: 12/15/24 0921     Staph aureus ID by PCR Positive     Methicillin Resistant ID by PCR Negative    Gram stain [[id number]] Collected: 12/13/24 1012    Order Status: Completed Specimen: Bone from Foot, Right Updated: 12/13/24 1417     Gram Stain Result No WBC's      No organisms seen    Blood culture [[id number]]     Order Status: Canceled Specimen: Blood     Blood culture [[id number]]     Order Status: Canceled Specimen: Blood     Influenza A & B by Molecular [[id number]] Collected: 12/12/24 1612    Order Status: Completed Specimen: Nasopharyngeal Swab Updated: 12/12/24 1730     Influenza A, Molecular Negative     Influenza B, Molecular Negative     Flu A & B Source Nasal swab          Specimen (24h ago, onward)      None            Diagnostic Results:  I have reviewed all pertinent imaging results/findings within the past 24 hours.  Assessment/Plan:     Endocrine  * Diabetic foot ulcer associated with diabetes mellitus due to underlying condition  59-year-old male with PMH CAD, DM2, HLD, HTN, prior pedal amputations presents to ED 12/12/24 for generalized weakness, chills, nausea.  Patient has had recent history with Dr. Mingo Melara DPM for right foot 2nd metatarsal head excision DOS 12/6/24. Podiatry consulted for management of aforementioned problem.     Assessment: Post-surgical infection of right foot  incision in setting of DM. Per exam, probe to bone s/p debridement of ulcer. XR of right foot was obtained showing amputation changes of 2nd and 5th rays at the level of metatarsal shafts; no osseous infiltration of infection observed.  MRI of right foot obtained showing OM of the 3rd metatarsal shaft, periostitis versus early OM like changes to the 2nd metatarsal shaft.     Plan:  - Discussed at length surgical vs conservative treatment options for osteomyelitis and current prognosis.  Discussed risks and benefits of each at length.  Discussed surgical treatment plan with patient including TMA postoperative course as well as affects of daily living.  At this time, patient is amenable to conservative treatment including IV antibiotics, regular wound care, and close follow up with both Infectious Disease and Podiatry.  Reiterated risks that come along with conservative treatment, patient gave verbal understanding of risks.  - foot dressed, wound care orders to follow.  - wound culture, bone biopsy growing staph a, antibiotics per Infectious Disease.  - weight-bearing as tolerated to right foot in postop shoe.  - podiatry will follow, please reach out for any questions or concerns or patient change in treatment option.    Future DC recs  - HH or SNF to change dressings 3x weekly as follows: Please cleanse right foot, then apply betadine soaked packing strip to ulcer, then gauze, kerlix and ace wrap.   - Patient to follow up with podiatry, podiatry to arrange.  - PWB to right heel in post op shoe.   - Abx per ID.        Robert Brand DPM  Podiatry  [facility]

## 2024-12-18 NOTE — PLAN OF CARE
Date: 12/18/2024      Patient Name: Billy Rivera  YOB: 1965  [address] Medicine Dept.  [facility] [address] [place] [address] [phone] after hours  [phone] fax Billy Rivera has been hospitalized at the [facility] since 12/12/2024.  Please excuse the patient from duties.    Please contact me if you have any questions.      Katerine Tomlin MD  12/18/2024

## 2024-12-18 NOTE — PLAN OF CARE
APPOINTMENT:    Patient Appointment(s) scheduled with  ROBEL Jhaveri Friday Dec 27, 2024 9:30 AM

## 2024-12-18 NOTE — NURSING
Rt foot drsg falling off, reinforced with additional kerlix drsg and re-wrapped with ace wrap, tolerated well. Nad/complaints.

## 2024-12-18 NOTE — ASSESSMENT & PLAN NOTE
59-year-old male with PMH CAD, DM2, HLD, HTN, prior pedal amputations presents to ED 12/12/24 for generalized weakness, chills, nausea.  Patient has had recent history with Dr. Mingo Melara DPM for right foot 2nd metatarsal head excision DOS 12/6/24. Podiatry consulted for management of aforementioned problem.     Assessment: Post-surgical infection of right foot incision in setting of DM. Per exam, probe to bone s/p debridement of ulcer. XR of right foot was obtained showing amputation changes of 2nd and 5th rays at the level of metatarsal shafts; no osseous infiltration of infection observed.  MRI of right foot obtained showing OM of the 3rd metatarsal shaft, periostitis versus early OM like changes to the 2nd metatarsal shaft.     Plan:  - Discussed at length surgical vs conservative treatment options for osteomyelitis and current prognosis.  Discussed risks and benefits of each at length.  Discussed surgical treatment plan with patient including TMA postoperative course as well as affects of daily living.  At this time, patient is amenable to conservative treatment including IV antibiotics, regular wound care, and close follow up with both Infectious Disease and Podiatry.  Reiterated risks that come along with conservative treatment, patient gave verbal understanding of risks.  - foot dressed, wound care orders to follow.  - wound culture, bone biopsy growing staph a, antibiotics per Infectious Disease.  - weight-bearing as tolerated to right foot in postop shoe.  - podiatry will follow, please reach out for any questions or concerns or patient change in treatment option.    Future DC recs  - HH or SNF to change dressings 3x weekly as follows: Please cleanse right foot, then apply betadine soaked packing strip to ulcer, then gauze, kerlix and ace wrap.   - Patient to follow up with podiatry, podiatry to arrange.  - PWB to right heel in post op shoe.   - Abx per ID.

## 2024-12-18 NOTE — NURSING
Status stable and unchanged, slept well, had an uneventful night. Drsg to foot remains d/I. Denies pain. Resting quietly in bed at present.

## 2024-12-18 NOTE — PROGRESS NOTES
[facility] Home IV ATB Education/Discharge Planning Note:     [facility] educator met with the patient and discussed discharge plan for home IV ATB. Billy Rivera will discharge home with family support but will self infuse. Patient's IV medication will infuse via Elastomeric Pump (continuous infusion). The patient was educated on [facility].A.[facility] procedure & [facility] S.A.[facility] mat provided. Patient education checklist and [facility] consent to treatment form reviewed and acknowledged by the patient and he is agreeable to discharge with home infusion plan of care. IV administration process using aspetic technique was reviewed with successful return demonstration by the patient. The patient feels comfortable with home infusion process after bedside education provided. The patient is also comfortable with infusing without assistance of nurse, as he is aware that he will be responsible for self connecting/infusing most doses of his IV ATB. Additionally, patient has completed IV ATB in the home in the past, so bedside education was for reinforcement purposes, as well as to discuss the details of his newly prescribed IV ATB. The patient will discharge home on IV Ceazolin 6g Q24 hours (as a continuous infusion) for estimated end of therapy date of 1/26/2024. Dosing/connect schedule time will be daily, beginning with first home dose due on 12/18/2024; patient independently connected himself to a home continuous dose of his IV Cefazolin at 14:50 prior to discharge on 12/18/2024. I also confirmed that the patient appropriately received doses prior to discharge on 12/18/2024. Extension set placed on DL PICC by [facility] PAPI JONES, as patient will be self infusing. Patient accepted to care by [facility] for weekly dressing changes and lab draws.     Time allotted for questions; questions addressed appropriately. The patient's home IV ATB & supplies have been delivered to the  bedside. Provided patient with [facility] support number [phone] & [facility] number [phone]. Patient is ready for discharge home from [facility] perspective. Patient's discharge planning team and bedside nurse updated with the information above.      -Patient has been accepted to care by [facility] and report called to Rina/Nathaly/Mily. They confirmed patient has been accepted onto services by the agency.  -Phone number [phone]     Please do not hesitate to reach out for any additional needs.    Jhoan Blackwell MS, RDN, LDN  Clinical Liaison & Dietitian  [facility]   Phone: [phone]  [email or web address]

## 2024-12-18 NOTE — PLAN OF CARE
[facility]      HOME HEALTH ORDERS  FACE TO FACE ENCOUNTER    Patient Name: Billy Rivera  YOB: 1965    PCP: Augie Ruiz MD   PCP Address: [address] / [place] [address]  PCP Phone Number: [phone]  PCP Fax: [phone]    Encounter Date: 12/12/24    Admit to Home Health    Diagnoses:  Active Hospital Problems    Diagnosis  POA    *Diabetic foot ulcer associated with diabetes mellitus due to underlying condition [E08.621, L97.509]  Yes    Bacterial infection due to Staphylococcus [B95.8]  Yes    JOVANNI (acute kidney injury) [N17.9]  Yes    Obesity [E66.9]  Yes    Subacute osteomyelitis of right foot [M86.271]  Yes    Pyogenic inflammation of bone [M86.9]  Yes    Hx of colon cancer, stage I [Z85.038]  Yes    Hyponatremia [E87.1]  Yes    Type 2 diabetes mellitus 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, with long-term current use of insulin [E11.3393, Z79.4]  Not Applicable    Essential hypertension [I10]  Yes      Resolved Hospital Problems    Diagnosis Date Resolved POA    Acute osteomyelitis of left foot [M86.172] 10/13/2020 Yes       Follow Up Appointments:  Future Appointments   Date Time Provider Department Center   12/18/2024 11:30 AM Josiah Saunders DPM [facility] P[facility]   12/26/2024  1:00 PM Josiah Saunders DPM [facility]   12/27/2024  9:30 AM Peterson Rivera FNP-C [facility] [place]   1/8/2025  9:00 AM Cielo Mulligan PA-C [facility]   1/9/2025  8:00 AM Mingo Melara DPM [facility]   1/24/2025 11:00 AM Saadia Carlos PA-C [facility]   3/13/2025  2:30 PM Yunier Avendaño PA-C [facility]ER[facility]   3/25/2025  9:00 AM Chandrika Boogie NP [facility]OD[facility] [address]       Allergies:  Review of patient's allergies indicates:   Allergen Reactions    Penicillins Other (See Comments)     PCN allergy as a child - was told he went into a coma. Tolerates  Cefazolin without adverse reactions    Shellfish containing products      Other reaction(s): Unknown    Vancomycin Itching     Tolerated vancomycin 7/2020    Bactrim  [sulfamethoxazole-trimethoprim] Rash       Medications: Review discharge medications with patient and family and provide education.    Current Facility-Administered Medications   Medication Dose Route Frequency Provider Last Rate Last Admin    acetaminophen tablet 650 mg  650 mg Oral Q4H PRN Idania Mendosa PA-C   650 mg at 12/14/24 2228    albuterol-ipratropium 2.5 mg-0.5 mg/3 mL nebulizer solution 3 mL  3 mL Nebulization Q4H PRN Idania Mendosa PA-C        amLODIPine tablet 10 mg  10 mg Oral Daily Perico Florentino MD   10 mg at 12/17/24 0917    atorvastatin tablet 80 mg  80 mg Oral QHS Idania Mendosa PA-C   80 mg at 12/17/24 2129    bisacodyL suppository 10 mg  10 mg Rectal Daily PRN Idania Mendosa PA-C        ceFAZolin 2 g  2 g Intravenous Q8H Shyam Silverio Jr., PA   2 g at 12/18/24 0128    dextrose 10% bolus 125 mL 125 mL  12.5 g Intravenous PRN Idania Mendosa PA-C        dextrose 10% bolus 250 mL 250 mL  25 g Intravenous PRN Idania Mendosa PA-C        diphenhydrAMINE injection 25 mg  25 mg Intravenous Q12H PRN Idania Mendosa PA-C        enoxaparin injection 40 mg  40 mg Subcutaneous Daily Idania Mendosa PA-C   40 mg at 12/17/24 1726    glucagon (human recombinant) injection 1 mg  1 mg Intramuscular PRN Idania Mendosa PA-C        glucose chewable tablet 16 g  16 g Oral PRN Idania Mendosa PA-C        glucose chewable tablet 24 g  24 g Oral PRN Idania Mendosa PA-C        insulin aspart U-100 pen 0-5 Units  0-5 Units Subcutaneous QID (AC + HS) PRN Idania Mendosa PA-C   2 Units at 12/17/24 2130    insulin aspart U-100 pen 12 Units  12 Units Subcutaneous TIDWM Katerine Tomlin MD   12 Units at 12/17/24 1725    insulin glargine U-100 (Lantus) pen 35 Units  35  Units Subcutaneous BID Katerine Tomlin MD   35 Units at 12/17/24 2130    melatonin tablet 6 mg  6 mg Oral Nightly PRN Idania Mendosa PA-C   6 mg at 12/16/24 0050    polyethylene glycol packet 17 g  17 g Oral Daily PRN Idania Mendosa PA-C        sodium chloride 0.9% flush 10 mL  10 mL Intravenous Q12H PRN Ktaerine Tomlin MD            Medication List        CHANGE how you take these medications      atorvastatin 80 MG tablet  Commonly known as: LIPITOR  Take 1 tablet (80 mg total) by mouth every evening.  What changed: Another medication with the same name was removed. Continue taking this medication, and follow the directions you see here.     carvediloL 12.5 MG tablet  Commonly known as: COREG  Take 0.5 tablets (6.25 mg total) by mouth 2 (two) times daily with meals.  What changed: Another medication with the same name was removed. Continue taking this medication, and follow the directions you see here.     OZEMPIC 0.25 mg or 0.5 mg (2 mg/3 mL) pen injector  Generic drug: semaglutide  Inject 0.5 mg into the skin every 7 days.  What changed: Another medication with the same name was removed. Continue taking this medication, and follow the directions you see here.            CONTINUE taking these medications      acetaminophen 500 MG tablet  Commonly known as: TYLENOL  Take 1,000 mg by mouth daily as needed for Pain.     amLODIPine 10 MG tablet  Commonly known as: NORVASC  Take 1 tablet (10 mg total) by mouth once daily.     aspirin 81 MG EC tablet  Commonly known as: ECOTRIN  Take 1 tablet (81 mg total) by mouth once daily.     bismuth subsalicylate 262 mg/15 mL suspension  Commonly known as: PEPTO BISMOL  Take 15 mLs by mouth daily as needed (diarrhea).     blood sugar diagnostic Strp  Commonly known as: ACCU-CHEK GUIDE TEST STRIPS  1 each by Misc.(Non-Drug; Combo Route) route 5 (five) times daily.     blood-glucose sensor Haley  Inject 1 each into the skin every 7 days.     FIASP U-100 INSULIN  "100 unit/mL Soln  Generic drug: insulin aspart (niacinamide)  Inject 100 Units into the skin continuous. Gives via insulin pump up to 100 units daily. Do not directly inject. Uses as directed. Rate is 1.75 units/hour - carb counts as well for meal time boluses.     insulin lispro 100 unit/mL pen  Inject into the skin.     lancets Misc  Commonly known as: ACCU-CHEK FASTCLIX LANCET DRUM  1 each by Misc.(Non-Drug; Combo Route) route Daily.     MEN'S MULTIVITAMIN ORAL  Take 1 tablet by mouth once daily.     metFORMIN 1000 MG tablet  Commonly known as: GLUCOPHAGE  Take 1 tablet (1,000 mg total) by mouth 2 (two) times daily with meals.     MINIMED 780G INSULIN PUMP Misc  Generic drug: subcutaneous insulin pump  1 each by Misc.(Non-Drug; Combo Route) route once daily.     NovoLIN 70-30 FlexPen U-100 100 unit/mL (70-30) Inpn pen  Generic drug: insulin NPH/Reg human  Inject 36 units before breakfast and 30 units before dinner     pen needle, diabetic 31 gauge x 3/16" Ndle  Inject 1 each into the skin 4 (four) times daily.            STOP taking these medications      ciprofloxacin HCl 500 MG tablet  Commonly known as: CIPRO     clindamycin 300 MG capsule  Commonly known as: CLEOCIN     doxycycline 100 MG Cap  Commonly known as: VIBRAMYCIN     insulin regular 100 unit/mL injection     levoFLOXacin 750 MG tablet  Commonly known as: LEVAQUIN     lisinopriL 40 MG tablet  Commonly known as: PRINIVIL,ZESTRIL     MULTIPLE VITAMIN ORAL     oxyCODONE-acetaminophen 7.5-325 mg per tablet  Commonly known as: PERCOCET                I have seen and examined this patient within the last 30 days. My clinical findings that support the need for the home health skilled services and home bound status are the following:no   Weakness/numbness causing balance and gait disturbance due to Infection making it taxing to leave home.     Diet:   diabetic diet 2000 calorie    Labs:  none    Referrals/ Consults  Physical Therapy to evaluate and treat. " Evaluate for home safety and equipment needs; Establish/upgrade home exercise program. Perform / instruct on therapeutic exercises, gait training, transfer training, and Range of Motion.  Occupational Therapy to evaluate and treat. Evaluate home environment for safety and equipment needs. Perform/Instruct on transfers, ADL training, ROM, and therapeutic exercises.   to evaluate for community resources/long-range planning.  Aide to provide assistance with personal care, ADLs, and vital signs.    Activities:   activity as tolerated    Nursing:   Agency to admit patient within 24 hours of hospital discharge unless specified on physician order or at patient request    SN to complete comprehensive assessment including routine vital signs. Instruct on disease process and s/s of complications to report to MD. Review/verify medication list sent home with the patient at time of discharge  and instruct patient/caregiver as needed. Frequency may be adjusted depending on start of care date.     Skilled nurse to perform up to 3 visits PRN for symptoms related to diagnosis    Notify MD if SBP > 160 or < 90; DBP > 90 or < 50; HR > 120 or < 50; Temp > 101; O2 < 88%; Other:       Ok to schedule additional visits based on staff availability and patient request on consecutive days within the home health episode.    When multiple disciplines ordered:    Start of Care occurs on Sunday - Wednesday schedule remaining discipline evaluations as ordered on separate consecutive days following the start of care.    Thursday SOC -schedule subsequent evaluations Friday and Monday the following week.     Friday - Saturday SOC - schedule subsequent discipline evaluations on consecutive days starting Monday of the following week.    For all post-discharge communication and subsequent orders please contact patient's primary care physician. If unable to reach primary care physician or do not receive response within 30 minutes, please  contact [phone] for clinical staff order clarification    Miscellaneous   Home Infusion Therapy:   SN to perform Infusion Therapy/Central Line Care.  Review Central Line Care & Central Line Flush with patient.    Administer (drug and dose):       Last dose given: 12/18/2024              Home dose due: 12/19/2024      Outpatient Antibiotic Therapy Plan:     Please send referral to [facility].     1) Infection: MSSA osteomyelitis of R 2nd metatarsal shaft; MSSA bacteremia      2) Discharge Antibiotics:     Intravenous antibiotics:  Cefazolin 6g IV q 24 hours      3) Therapy Duration:  6 weeks     Estimated end date of IV antibiotics: 01/26/2025     4) Outpatient Weekly Labs:     Order the following labs to be drawn on Mondays:   CBC  CMP   CRP     5) Fax Lab Results to Infectious Diseases Provider: Saadia Carlos PA-C     [facility] Fax Number: [phone]  Scrub the Hub: Prior to accessing the line, always perform a 30 second alcohol scrub  Each lumen of the central line is to be flushed at least daily with 10 mL Normal Saline and 3 mL Heparin flush (10 units/mL)  Skilled Nurse (SN) may draw blood from IV access  Blood Draw Procedure:   - Aspirate at least 5 mL of blood   - Discard   - Obtain specimen   - Change injection cap   - Flush with 20 mL Normal Saline followed by a                 3-5 mL Heparin flush (10 units/mL)  Central :   - Sterile dressing changes are done weekly and as needed.   - Use chlor-hexadine scrub to cleanse site, apply Biopatch to insertion site,       apply securement device dressing   - Injection caps are changed weekly and after EVERY lab draw.   - If sterile gauze is under dressing to control oozing,                 dressing change must be performed every 24 hours until gauze is not needed.    Home Health Aide:  Nursing Three times weekly, Physical Therapy Three times weekly, Occupational Therapy Three times weekly, Medical  Social Work Three times weekly, and Home Health Aide Three times weekly    Wound Care Orders    Right foot: change dressings 3x weekly as follows: Please cleanse right foot, then apply betadine soaked packing strip to ulcer, then gauze, kerlix and ace wrap.     I certify that this patient is confined to his home and needs intermittent skilled nursing care, physical therapy, and occupational therapy.

## 2024-12-18 NOTE — PLAN OF CARE
Problem: Adult Inpatient Plan of Care  Goal: Plan of Care Review  Outcome: Progressing  Goal: Patient-Specific Goal (Individualized)  Outcome: Progressing  Goal: Absence of Hospital-Acquired Illness or Injury  Outcome: Progressing  Goal: Optimal Comfort and Wellbeing  Outcome: Progressing  Goal: Readiness for Transition of Care  Outcome: Progressing     Problem: Infection  Goal: Absence of Infection Signs and Symptoms  Outcome: Progressing     Problem: Diabetes Comorbidity  Goal: Blood Glucose Level Within Targeted Range  Outcome: Progressing     Problem: Wound  Goal: Optimal Coping  Outcome: Progressing  Goal: Optimal Functional Ability  Outcome: Progressing  Goal: Absence of Infection Signs and Symptoms  Outcome: Progressing  Goal: Improved Oral Intake  Outcome: Progressing  Goal: Optimal Pain Control and Function  Outcome: Progressing  Goal: Skin Health and Integrity  Outcome: Progressing  Goal: Optimal Wound Healing  Outcome: Progressing     Problem: Fall Injury Risk  Goal: Absence of Fall and Fall-Related Injury  Outcome: Progressing     Problem: Acute Kidney Injury/Impairment  Goal: Fluid and Electrolyte Balance  Outcome: Progressing  Goal: Improved Oral Intake  Outcome: Progressing  Goal: Effective Renal Function  Outcome: Progressing     Problem: Infection  Goal: Absence of Infection Signs and Symptoms  Outcome: Progressing     Problem: Diabetes Comorbidity  Goal: Blood Glucose Level Within Targeted Range  Outcome: Progressing     Problem: Wound  Goal: Optimal Coping  Outcome: Progressing     Problem: Fall Injury Risk  Goal: Absence of Fall and Fall-Related Injury  Outcome: Progressing

## 2024-12-18 NOTE — DISCHARGE INSTRUCTIONS
Our goal at [facility] is to always give you outstanding care and exceptional service. You may receive a survey by mail, text or e-mail in the next 7-10 days from Joyce Yarbrough and our leadership team asking about the care you received with us. The survey should only take 5-10 minutes to complete and is very important to us.     Your feedback provides us with a way to recognize our staff who work tirelessly to provide the best care! Also, your responses help us learn how to improve when your experience was below our aspiration of excellence. We WILL use your feedback to continue making improvements to help us provide the highest quality care. We keep your personal information and feedback confidential. We appreciate your time completing this survey and can't wait to hear from you!!!     We want you to leave us today feeling like you can DEFINITELY recommend us to others! We look forward to your continued care with us! Thanks so much for choosing [facility] for your healthcare needs!

## 2024-12-19 ENCOUNTER — TELEPHONE (OUTPATIENT)
Dept: PODIATRY | Facility: CLINIC | Age: 59
End: 2024-12-19
Payer: COMMERCIAL

## 2024-12-19 LAB
BACTERIA BLD CULT: ABNORMAL

## 2024-12-19 NOTE — TELEPHONE ENCOUNTER
Left vm message for patient to contact office at [phone] in regards to early am appointment on 12/26/2024 with Dr. Saunders(Podiatry) instead of pm due to clinic closing @12:00noon

## 2024-12-20 ENCOUNTER — PATIENT OUTREACH (OUTPATIENT)
Dept: ADMINISTRATIVE | Facility: CLINIC | Age: 59
End: 2024-12-20
Payer: COMMERCIAL

## 2024-12-20 LAB
BACTERIA BLD CULT: NORMAL
BACTERIA BLD CULT: NORMAL

## 2024-12-20 NOTE — PROGRESS NOTES
C3 nurse spoke with Billy Rivera for a TCC post hospital discharge follow up call. The patient has a scheduled HOSFU appointment with ROBEL Jhaveri on 12/27/24 @ 3215.

## 2024-12-20 NOTE — TELEPHONE ENCOUNTER
ceFAZolin (Ancef) IV (PEDS and ADULTS) 2 g self infusing   -not reflective on med rec, per chart review

## 2024-12-23 ENCOUNTER — APPOINTMENT (OUTPATIENT)
Dept: LAB | Facility: HOSPITAL | Age: 59
End: 2024-12-23
Attending: HOSPITALIST
Payer: COMMERCIAL

## 2024-12-23 ENCOUNTER — TELEPHONE (OUTPATIENT)
Dept: INTERNAL MEDICINE | Facility: CLINIC | Age: 59
End: 2024-12-23
Payer: COMMERCIAL

## 2024-12-23 NOTE — TELEPHONE ENCOUNTER
Spoke with patient regarding PA needed for medication ozempic. Patient informed that PAP referral was ordered by provider and to give them a call to check on status of application. I will also send a message to department.  ----- Message from ROBEL Jhaveri sent at 12/23/2024  2:54 PM CST -----  Regarding: Contact patient  Contact: [phone]  We sent a referral to [facility] for his Ozempic. Can you please ask him the status of his assistance and which pharmacy to send the Ozempic so we can get the PA started?   Thanks  ----- Message -----  From: Saritha Foreman  Sent: 12/23/2024   2:27 PM CST  To: ROBEL Rebolledo    .1MEDICALADVICE     Patient is calling for Medical Advice regarding:pt is requesting a prior authorization to [facility] so he can get his semaglutide (OZEMPIC) 0.25 mg or 0.5 mg (2 mg/3 mL) pen injector pt is in need of medication please and if any questions to call pt.    How long has patient had these symptoms:    Pharmacy name and phone#:    Patient wants a call back or thru [facility]:call back     Comments:    Please advise patient replies from provider may take up to 48 hours.

## 2024-12-26 ENCOUNTER — OFFICE VISIT (OUTPATIENT)
Dept: PODIATRY | Facility: CLINIC | Age: 59
End: 2024-12-26
Payer: COMMERCIAL

## 2024-12-26 VITALS
SYSTOLIC BLOOD PRESSURE: 139 MMHG | DIASTOLIC BLOOD PRESSURE: 75 MMHG | BODY MASS INDEX: 35.82 KG/M2 | HEART RATE: 59 BPM | HEIGHT: 74 IN

## 2024-12-26 DIAGNOSIS — L08.9 DIABETIC FOOT INFECTION: Primary | ICD-10-CM

## 2024-12-26 DIAGNOSIS — E11.628 DIABETIC FOOT INFECTION: Primary | ICD-10-CM

## 2024-12-26 DIAGNOSIS — L97.514 ULCER OF TOE, RIGHT, WITH NECROSIS OF BONE: ICD-10-CM

## 2024-12-26 PROCEDURE — 99999 PR PBB SHADOW E&M-EST. PATIENT-LVL IV: CPT | Mod: PBBFAC,,, | Performed by: STUDENT IN AN ORGANIZED HEALTH CARE EDUCATION/TRAINING PROGRAM

## 2024-12-26 NOTE — PROGRESS NOTES
Subjective:     Patient    Billy Rivera is a 59 y.o. male.    Problem    Presents s/p right 2nd metatarsal head excision and ulcer debridement 3 weeks ago. Subsequently admitted for severe right foot infection, recently discharged. Wound appears to be healing, no new concerns from patient.     Primary Care Provider    Primary Care Provider: Augie Ruiz MD   Last Seen: 11/27/2024     History    History obtained from patient and review of medical records.     Past Medical History:   Diagnosis Date    Allergy     CAD (coronary artery disease), native coronary artery 6/25/2013    Cancer     COVID-19 virus detected 9/1/2020    Diabetes mellitus     Diabetes mellitus, type 2     Disorder of kidney and ureter     Heart attack 04/2012    Hx of colon cancer, stage I     Hyperlipidemia     Hypertension     Muscular pain     post-op after colonoscopy    NSTEMI May 2013 - peak troponin 0.22 5/22/2013    MICHAELA (obstructive sleep apnea)     Retinopathy due to secondary diabetes        Past Surgical History:   Procedure Laterality Date    COLONOSCOPY N/A 2/17/2017    Procedure: COLONOSCOPY;  Surgeon: Simon Gomez MD;  Location: [facility] (4TH FLR);  Service: Endoscopy;  Laterality: N/A;    CORONARY ANGIOPLASTY      EXCISION, TARSAL OR METATARSAL BONE, EXCLUDING TALUS OR CALCANEUS, PARTIAL Right 12/6/2024    Procedure: EXCISION,TARSAL OR METATARSAL BONE,EXCLUDING TALUS OR CALCANEUS,PARTIAL;  Surgeon: Mingo Melara DPM;  Location: [facility];  Service: Podiatry;  Laterality: Right;    INCISION AND DRAINAGE FOOT Right 6/5/2018    Procedure: INCISION AND DRAINAGE, FOOT;  Surgeon: Bridget Santana DPM;  [address];  Service: Podiatry;  Laterality: Right;  Request mini C arm in room. request wound vac with medium black sponge    INCISION AND DRAINAGE FOOT Right 6/7/2018    Procedure: INCISION AND DRAINAGE, FOOT;  Surgeon: Emelia Brock DPM;  Location: [facility] OR 2ND FLR;  Service: Podiatry;   Laterality: Right;    INCISION AND DRAINAGE FOOT Left 9/4/2020    Procedure: INCISION AND DRAINAGE, FOOT;  Surgeon: Ainsley Powell DPM;  Location: [facility] OR 2ND FLR;  Service: Podiatry;  Laterality: Left;    INCISION AND DRAINAGE FOOT Left 12/22/2020    Procedure: INCISION AND DRAINAGE, FOOT;  Surgeon: Ainsley Powell DPM;  Location: [facility] OR 2ND FLR;  Service: Podiatry;  Laterality: Left;  May need micro choice  Need Jam shidi needles  Stretcher OK      INCISION AND DRAINAGE OF ABSCESS Right 6/2/2018    Procedure: INCISION AND DRAINAGE, ABSCESS;  Surgeon: Bridget Santana DPM;  Location: [facility] OR 2ND FLR;  Service: General;  Laterality: Right;    OSTEOTOMY OF METATARSAL BONE  9/4/2020    Procedure: OSTEOTOMY, METATARSAL BONE, 1st METATARSAL RESECTION;  Surgeon: Ainsley Powell DPM;  Location: [facility] OR 2ND FLR;  Service: Podiatry;;    REMOVAL OF FOREIGN BODY FROM FOOT Right 6/7/2018    Procedure: REMOVAL, FOREIGN BODY, FOOT;  Surgeon: Emelia Brock DPM;  Location: [facility] OR 2ND FLR;  Service: Podiatry;  Laterality: Right;    TOE AMPUTATION Left 7/4/2020    Procedure: AMPUTATION, TOE;  Surgeon: Bridget Santana DPM;  Location: [facility] OR 2ND FLR;  Service: Podiatry;  Laterality: Left;    TOE AMPUTATION Left 10/14/2020    Procedure: AMPUTATION, TOE;  Surgeon: Mingo Melara DPM;  Location: [facility] OR 2ND FLR;  Service: Podiatry;  Laterality: Left;  stretcher OK    TOE AMPUTATION Right 6/9/2022    Procedure: AMPUTATION, TOE - Dr. Melara @ [place] in am;  Surgeon: Mingo Melara DPM;  Location: [facility] OR 1ST FLR;  Service: Podiatry;  Laterality: Right;    TOE AMPUTATION Right 11/23/2022    Procedure: AMPUTATION, TOE;  Surgeon: Hansa Robertson DPM;  Location: [facility] OR 2ND FLR;  Service: Podiatry;  Laterality: Right;    TOE AMPUTATION Right 11/25/2022    Procedure: AMPUTATION, TOE;  Surgeon: Chris Groves DPM;  Location: [facility] OR 2ND FLR;  Service: Podiatry;  Laterality: Right;    TONSILLECTOMY          Objective:      Vitals  Wt Readings from Last 1 Encounters:   24 126.6 kg (279 lb)     Temp Readings from Last 1 Encounters:   24 97.9 °F (36.6 °C) (Oral)     BP Readings from Last 1 Encounters:   24 139/75     Pulse Readings from Last 1 Encounters:   24 (!) 59       Dermatological Exam    Skin:  Pedal hair growth diminished and Pedal skin thin and shiny on right; ulcer to fat, improving per prior pictures  Pedal hair growth diminished and Pedal skin thin and shiny on left         Nails:  All nail(s) thickened and all nail(s) discolored    Vascular Exam    Arteries:  Posterior tibial artery nonpalpable on right  Dorsalis pedis artery palpable on right  Posterior tibial artery nonpalpable on left  Dorsalis pedis artery palpable on left    Veins:  Superficial veins unremarkable on right  Superficial veins unremarkable on left    Swellin+ nonpitting on right  1+ nonpitting on left    Neurological Exam    [place] touch test:  0/6 sites sensed, loss of protective sensation     Musculoskeletal Exam    Footwear:  Surgical shoe on right  Diabetic on left    Gait Exam:   Ambulatory Status: Ambulatory  Gait: Apropulsive  Assistive Devices: None    Foot Progression Angle:  Normal on right  Normal on left     Right Lower Extremity Additional Findings:  Partial 3rd and 5th ray amputations  Right foot and ankle function, strength, and range of motion unremarkable except as noted above.     Left Lower Extremity Additional Findings:  Partial 1st ray amputation  Left foot and ankle function, strength, and range of motion unremarkable except as noted above.    Imaging and Other Tests    Imaging:  Independently reviewed and interpreted imaging, findings are as follows: N/A    Other Tests: The following A1c results were reviewed.   Hemoglobin A1C   Date Value Ref Range Status   2024 10.2 (H) 4.0 - 5.6 % Final     Comment:     ADA Screening Guidelines:  5.7-6.4%  Consistent with prediabetes  >or=6.5%  Consistent  with diabetes    High levels of fetal hemoglobin interfere with the HbA1C  assay. Heterozygous hemoglobin variants (HbS, HgC, etc)do  not significantly interfere with this assay.   However, presence of multiple variants may affect accuracy.     07/20/2023 8.7 (H) 4.0 - 5.6 % Final     Comment:     ADA Screening Guidelines:  5.7-6.4%  Consistent with prediabetes  >or=6.5%  Consistent with diabetes    High levels of fetal hemoglobin interfere with the HbA1C  assay. Heterozygous hemoglobin variants (HbS, HgC, etc)do  not significantly interfere with this assay.   However, presence of multiple variants may affect accuracy.     04/06/2023 9.4 (H) 4.0 - 5.6 % Final     Comment:     ADA Screening Guidelines:  5.7-6.4%  Consistent with prediabetes  >or=6.5%  Consistent with diabetes    High levels of fetal hemoglobin interfere with the HbA1C  assay. Heterozygous hemoglobin variants (HbS, HgC, etc)do  not significantly interfere with this assay.   However, presence of multiple variants may affect accuracy.           Assessment:     Encounter Diagnoses   Name Primary?    Ulcer of toe, right, with necrosis of bone     Diabetic foot infection Yes        Plan:     I counseled the patient on his conditions, their implications and medical management.    Diabetic foot with peripheral neuropathy: chronic stable   -Performed shoe inspection and diabetic foot education. Reviewed importance of blood glucose control, proper nutrition, and foot hygiene to minimize risk of complications of diabetes. Recommended daily foot inspections, daily moisturizer to feet, avoiding sharp instruments to feet, appropriate footwear at all times when ambulating, and following up regularly for routine foot care.   -Diabetic foot risk: 2023 IWF high ulcer risk.      Right foot ulcer: subacute  -Debrided, see procedure note.   -Right football dressing, surgical shoe.   -HH dressing changes, will stop once he returns to work.   -OK to return to work light  duty with surgical shoe.   -Antibiotic plan per ID.       Return to clinic in 2 weeks with Dr Melara as scheduled, call sooner PRN.

## 2024-12-26 NOTE — PROGRESS NOTES
Patient presents 1 week status post metatarsal head resection right foot.  Doing well incision ulceration healing very well.  No signs of infection.  Continue postop instructions and follow-up 1 week.

## 2024-12-26 NOTE — PROCEDURES
"Wound Debridement    Date/Time: 12/26/2024 11:45 AM    Performed by: Josiah Saunders DPM  Authorized by: Josiah Saunders DPM    Time out: Immediately prior to procedure a "time out" was called to verify the correct patient, procedure, equipment, support staff and site/side marked as required.    Consent Done?:  Yes (Verbal)    Wound Details:    Location:  Right foot    Location:  Right Dorsal Foot    Type of Debridement:  Excisional       Length (cm):  3       Width (cm):  1.5       Depth (cm):  0.4       Area (sq cm):  4.5       Percent Debrided (%):  100       Total Area Debrided (sq cm):  4.5    Depth of debridement:  Subcutaneous tissue    Tissue debrided:  Adipose, Dermis, Epidermis and Subcutaneous    Devitalized tissue debrided:  Biofilm, Callus, Fibrin and Slough    Instruments:  Curette  Bleeding:  Minimal  Hemostasis Achieved: Yes  Method Used:  Pressure  Patient tolerance:  Patient tolerated the procedure well with no immediate complications      "

## 2024-12-26 NOTE — LETTER
December 26, 2024      [id number]  [address]  [facility] [address]  Phone: [phone]       Patient: Billy Rivera   YOB: 1965  Date of Visit: 12/26/2024    To Whom It May Concern:    Sharron Rivera  was at [facility] on 12/26/2024. He may return to work on 12/30/24 with restrictions: light duty only, must be allowed to wear surgical shoe on right. If you have any questions or concerns, or if I can be of further assistance, please do not hesitate to contact me.    Sincerely,        Josiah Saunders DPM

## 2024-12-27 ENCOUNTER — OFFICE VISIT (OUTPATIENT)
Dept: INTERNAL MEDICINE | Facility: CLINIC | Age: 59
End: 2024-12-27
Payer: COMMERCIAL

## 2024-12-27 VITALS
HEART RATE: 67 BPM | HEIGHT: 74 IN | DIASTOLIC BLOOD PRESSURE: 68 MMHG | BODY MASS INDEX: 35.23 KG/M2 | OXYGEN SATURATION: 97 % | WEIGHT: 274.5 LBS | SYSTOLIC BLOOD PRESSURE: 130 MMHG

## 2024-12-27 DIAGNOSIS — E78.5 DYSLIPIDEMIA ASSOCIATED WITH TYPE 2 DIABETES MELLITUS: ICD-10-CM

## 2024-12-27 DIAGNOSIS — Z79.4 TYPE 2 DIABETES MELLITUS WITH HYPERGLYCEMIA, WITH LONG-TERM CURRENT USE OF INSULIN: Primary | ICD-10-CM

## 2024-12-27 DIAGNOSIS — E11.65 TYPE 2 DIABETES MELLITUS WITH HYPERGLYCEMIA, WITH LONG-TERM CURRENT USE OF INSULIN: Primary | ICD-10-CM

## 2024-12-27 DIAGNOSIS — E11.69 DYSLIPIDEMIA ASSOCIATED WITH TYPE 2 DIABETES MELLITUS: ICD-10-CM

## 2024-12-27 PROCEDURE — 99999 PR PBB SHADOW E&M-EST. PATIENT-LVL V: CPT | Mod: PBBFAC,,,

## 2024-12-27 RX ORDER — SEMAGLUTIDE 0.68 MG/ML
0.25 INJECTION, SOLUTION SUBCUTANEOUS
Qty: 3 ML | Refills: 1 | Status: SHIPPED | OUTPATIENT
Start: 2024-12-27

## 2024-12-27 RX ORDER — INSULIN GLARGINE 100 [IU]/ML
25 INJECTION, SOLUTION SUBCUTANEOUS NIGHTLY
Qty: 15 ML | Refills: 2 | Status: SHIPPED | OUTPATIENT
Start: 2024-12-27 | End: 2025-12-27

## 2024-12-27 RX ORDER — BLOOD-GLUCOSE SENSOR
1 EACH MISCELLANEOUS
Qty: 3 EACH | Refills: 11 | Status: SHIPPED | OUTPATIENT
Start: 2024-12-27 | End: 2025-12-27

## 2024-12-27 RX ORDER — INSULIN ASPART 100 [IU]/ML
5 INJECTION, SOLUTION INTRAVENOUS; SUBCUTANEOUS
Qty: 15 ML | Refills: 2 | Status: SHIPPED | OUTPATIENT
Start: 2024-12-27 | End: 2025-12-27

## 2024-12-27 NOTE — PROGRESS NOTES
CHIEF COMPLAINT: Type 2 Diabetes    Referred by PCP: Dr. Ruiz  Previously managed by: Akua Powell CDE person of contact (if needed): Joanne Hooker     HPI: Mr. Billy Sheffield Miguel is a 59 y.o. male who was diagnosed with Type 2 DM 15 years ago.     Last A1c 10.2% (11/2024)    Hx of hospitalization for DM? No   Hx of HTN? Yes  Hx of CKD? No   Hx of CHF? No   Hx of CVA? No   Hx of MI? Yes   Hx of Pancreatitis? 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 Yes, exam UTD.  Hx of Long term steroid use? No    Previous was on Medtronic 780g pump with Novolin R, but no longer able to afford supplies. Plan to re-start his previous insulin (MDI) regimen along with Ozempic. Attempted [facility], but does not qualify for service due to commercial insurance. Phone not compatible with CGM.        VISIT SUMMARY:  S/p right 2nd metatarsal head excision and ulcer debridement on 12/6/2024. Subsequently admitted for severe right foot infection, recently discharged on 12/18/2024. Currently receiving home abx therapy via PICC line. He's also receiving  HH for wound care. A1c and LDL not at goal.  Reports taking Humulin 70/30 (36/30) and BG still uncontrolled.  BG range: 287-401. BG log scanned to visit.  Provided patient with Dexcom G7  and sensors. Discussed starting Ozempic, Lantus, Novolog, and Dexcom G7, patient agreed.    BG monitoring:   Name: Relion Glucometer  How often:BID  Hypoglycemia: No       PREVIOUS DIABETES MEDICATIONS TRIED  Ozempic- cost  Lantus- cost   Fiasp-cost  Novolog / Humalog / Lispro - cost  Metformin- GI upset      CURRENT DIABETIC MEDS:   Novolin 70/30 (36/30)    Diabetes Management Status:  Statin: Taking  ACE/ARB: Taking    Screening or Prevention Patient's value Goal Complete/Controlled?   HgA1C Testing and Control   Lab Results   Component Value Date    HGBA1C 10.2 (H) 11/27/2024      Annually/Less than 8% No   Lipid profile : 11/27/2024 Annually No   LDL control Lab Results   Component Value  "Date    LDLCALC 123.0 11/27/2024    Annually/Less than 100 mg/dl  No   Nephropathy screening Lab Results   Component Value Date    LABMICR 1381.0 11/27/2024     Lab Results   Component Value Date    PROTEINUA 2+ (A) 12/12/2024    Annually No   Blood pressure BP Readings from Last 1 Encounters:   12/27/24 130/68    Less than 140/90 No   Dilated retinal exam : 10/15/2024 Annually No   Foot exam   Deferred; UTD Annually Yes     REVIEW OF SYSTEMS  General: Denies weakness, fatigue, or weight changes.   Eyes: Denies double or blurred vision, eye pain, or redness.  Cardiovascular: Denies CP, palpitations, or edema.   Respiratory: Denies cough or dyspnea.   GI: Denies heartburn, n/v, or changes in bowel patterns.   Skin: Denies rash, lesions, itching, or injection site problems.  Neuro: Denies severe Has, numbness, tingling, tremors, or vertigo.   Endocrine: Denies polyuria, polydipsia, polyphagia, heat or cold intolerance.     Vital Signs  /68 (BP Location: Left arm, Patient Position: Sitting)   Pulse 67   Ht 6' 2" (1.88 m)   Wt 124.5 kg (274 lb 7.6 oz)   SpO2 97%   BMI 35.24 kg/m²     Hemoglobin A1C   Date Value Ref Range Status   11/27/2024 10.2 (H) 4.0 - 5.6 % Final     Comment:     ADA Screening Guidelines:  5.7-6.4%  Consistent with prediabetes  >or=6.5%  Consistent with diabetes    High levels of fetal hemoglobin interfere with the HbA1C  assay. Heterozygous hemoglobin variants (HbS, HgC, etc)do  not significantly interfere with this assay.   However, presence of multiple variants may affect accuracy.     07/20/2023 8.7 (H) 4.0 - 5.6 % Final     Comment:     ADA Screening Guidelines:  5.7-6.4%  Consistent with prediabetes  >or=6.5%  Consistent with diabetes    High levels of fetal hemoglobin interfere with the HbA1C  assay. Heterozygous hemoglobin variants (HbS, HgC, etc)do  not significantly interfere with this assay.   However, presence of multiple variants may affect accuracy.     04/06/2023 9.4 (H) " 4.0 - 5.6 % Final     Comment:     ADA Screening Guidelines:  5.7-6.4%  Consistent with prediabetes  >or=6.5%  Consistent with diabetes    High levels of fetal hemoglobin interfere with the HbA1C  assay. Heterozygous hemoglobin variants (HbS, HgC, etc)do  not significantly interfere with this assay.   However, presence of multiple variants may affect accuracy.          Chemistry        Component Value Date/Time     (L) 12/23/2024 1250    K 4.0 12/23/2024 1250     12/23/2024 1250    CO2 27 12/23/2024 1250    BUN 16 12/23/2024 1250    CREATININE 1.0 12/23/2024 1250     (H) 12/23/2024 1250        Component Value Date/Time    CALCIUM 8.9 12/23/2024 1250    ALKPHOS 107 12/23/2024 1250    AST 18 12/23/2024 1250    ALT 13 12/23/2024 1250    BILITOT 0.3 12/23/2024 1250    ESTGFRAFRICA >60.0 06/10/2022 0246    EGFRNONAA >60.0 06/10/2022 0246           Lab Results   Component Value Date    TSH 1.990 01/26/2022      Lab Results   Component Value Date    CHOL 220 (H) 11/27/2024    CHOL 135 07/20/2023    CHOL 151 04/06/2023     Lab Results   Component Value Date    HDL 35 (L) 11/27/2024    HDL 39 (L) 07/20/2023    HDL 35 (L) 04/06/2023     Lab Results   Component Value Date    LDLCALC 123.0 11/27/2024    LDLCALC 71.0 07/20/2023    LDLCALC 89.2 04/06/2023     Lab Results   Component Value Date    TRIG 310 (H) 11/27/2024    TRIG 125 07/20/2023    TRIG 134 04/06/2023     Lab Results   Component Value Date    CHOLHDL 15.9 (L) 11/27/2024    CHOLHDL 28.9 07/20/2023    CHOLHDL 23.2 04/06/2023         PHYSICAL EXAMINATION  Constitutional: Appears well, no distress.  Eyes: Sclera white, PERRLA, EOMs intact.  Neck: Supple, trachea midline.   Respiratory: No cough, SOB, nor HAIDER.  Cardiovascular: RRR; no edema.  Skin: Warm and dry; no rash, lesions, acanthosis nigricans, nor injection site reactions.  Neuro: AAOx4, steady gait  Psychiatric: No unusual, disruptive, or inappropriate behavior.       Assessment & Plan   1.  Type 2 diabetes mellitus with hyperglycemia, with long-term current use of insulin  semaglutide (OZEMPIC) 0.25 mg or 0.5 mg (2 mg/3 mL) pen injector    insulin glargine U-100, Lantus, (LANTUS SOLOSTAR U-100 INSULIN) 100 unit/mL (3 mL) InPn pen    insulin aspart U-100 (NOVOLOG FLEXPEN U-100 INSULIN) 100 unit/mL (3 mL) InPn pen    blood-glucose sensor (DEXCOM G7 SENSOR) [place]      -Reviewed  A1c.   -Discussed A1c goal <7%.  -Discussed lifestyle changes.  -Discussed correlation of high BG and infection.  -Discussed importance of getting diabetes better control to prevent delayed wound healing. and  further post op complications.  -Advised avoid exercise until surgical wound healed and cleared by podiatry.  -Cont. Metformin 1000 mg BID wm.  -Increase Novolin 70/30 to 42/36 until f/u visit.  -Start Ozempic 0.25mg weekly. Discussed MOA, SE, and dosage.  -Start Dexcom G7.  Provided patient with  and sensor. Educated patient on use and set up device. Also provided brochures on Dexcom G7.  .   -Review Dexcom report upon follow-up.  -Lantus and Novolog pending coverage.  Plan to start insulin after reviewing Dexcom report upon follow-up visit.  -Discussed goal to wean insulin to a minimal dose w/o experiencing hypo or hyperglycemia.   -Update labs prior to follow-up visit.       2. Dyslipidemia associated with type 2 diabetes mellitus  -Reviewed .  -Discussed LDL goal <70.  -Discussed lifestyle changes.  -Cont atorvastatin 80mg HS.  -Discussed the importance of taking statin every night and lifestyle changes to slow the risk of a cardiovascular event such as CVA or MI.           FOLLOW UP  Follow up in about 1 week (around 1/3/2025).

## 2024-12-27 NOTE — PATIENT INSTRUCTIONS
Continue monitoring your blood sugars using your Dexcom G7.    Increase your 70/30 to 42 units in the morning and 36 units in the evening until your next appointment in 7 days.    Continue taking Metformin as currently is.     Notify the office if you have any problems with getting your Ozempic, Dexcom, Lantus, or Novolog medications.     If you get your Ozempic before Monday or Tuesday, go ahead and take 0.25mg dose. *Be sure to take your injection on the same day every week. You can take your injection with or without food.   *Please notify the office if you start to experience severe nausea, vomiting, stomach or back pain after taking this medication.      If Dexcom sensor comes off, do not throw it away. Replace sensor and notify Dexcom.    F/u in 7 days for Dexcom report.     Bring your denial letter from [employer] to next visit.

## 2024-12-30 ENCOUNTER — APPOINTMENT (OUTPATIENT)
Dept: LAB | Facility: HOSPITAL | Age: 59
End: 2024-12-30
Attending: HOSPITALIST
Payer: COMMERCIAL

## 2024-12-31 NOTE — PROGRESS NOTES
Subjective:    The following note was written in combination with deep-scribe software and dictation (to which understanding and consent was verbally expressed); please excuse any transcription and/or dictation errors.      Chief Complaint: Follow-up (Hospital x 1 week for surgery right foot)    Transitional Care Note    Family and/or Caretaker present at visit?  Yes (wife)  Diagnostic tests reviewed/disposition: No diagnosic tests pending after this hospitalization.  Disease/illness education:  Osteomyelitis/sepsis  Home health/community services discussion/referrals: Patient does not have home health established from hospital visit.  They do not need home health.  If needed, we will set up home health for the patient.   Establishment or re-establishment of referral orders for community resources: No other necessary community resources.   Discussion with other health care providers: No discussion with other health care providers necessary.      Medication reconciliation completed    HPI  Mr. Billy Rivera is a 59-year-old man with poorly controlled type 2 diabetes (metformin 1000 b.i.d. and insulin via pump), coronary artery disease, hyperlipidemia (atorvastatin 80), hypertension (lisinopril 40, amlodipine 10, and carvedilol 6.25 b.i.d.), history of osteomyelitis (bilateral feet; status post bilateral toe amputations), history of colorectal cancer (stage I; 2019), etc. presenting for hospital follow-up:    Hospital follow-up:   -presenting for evaluation of left foot wound/infection in setting of chronic right foot wound (followed by podiatry; at that time, on p.o. clindamycin/ciprofloxacin prophylactically following right metatarsal surgery 12/5) with chills, decreased appetite, and generalized weakness (leading to a preceding fall)  -noted to have purulent drainage from right foot incision site  -MRI confirmed osteomyelitis of 2nd and 3rd metatarsals  - ID consulted and treated with IV vanc/Cipro  - podiatry  consulted and debrided tissue and took bone sample (positive MSSA)   -noted to have prerenal JOVANNI which improved with monitoring only (Ace held at discharge)  -noted to be hyponatremic which corrected with IV replacement  -since discharge, has felt well and has appropriate follow-up scheduled with both Infectious Disease podiatry/wound care in addition to formal establishment with our in office diabetic specialist.  -vital signs today are entirely reassuring; does not seem to require re-initiation of lisinopril    Review of Systems   Constitutional:  Negative for appetite change, chills and fever.   HENT: Negative.     Respiratory:  Negative for cough, chest tightness and shortness of breath.    Cardiovascular:  Negative for chest pain, palpitations and leg swelling.   Gastrointestinal:  Negative for abdominal distention, abdominal pain, blood in stool, constipation, diarrhea, nausea and vomiting.   Endocrine: Negative.    Genitourinary:  Negative for difficulty urinating, dysuria, frequency and hematuria.   Musculoskeletal: Negative.    Integumentary:  Negative.   Neurological: Negative.    Psychiatric/Behavioral: Negative.           Objective:      Vitals:    01/02/25 1108   BP: 110/66   Pulse: (!) 49   Resp: 18   Temp: 97.3 °F (36.3 °C)      Physical Exam  Vitals reviewed.   Constitutional:       General: He is not in acute distress.     Appearance: Normal appearance.   HENT:      Head: Normocephalic and atraumatic.      Comments: Facial features are symmetric      Nose: Nose normal. No congestion or rhinorrhea.      Mouth/Throat:      Mouth: Mucous membranes are moist.      Pharynx: Oropharynx is clear. No oropharyngeal exudate or posterior oropharyngeal erythema.   Eyes:      General: No scleral icterus.     Extraocular Movements: Extraocular movements intact.      Conjunctiva/sclera: Conjunctivae normal.   Cardiovascular:      Rate and Rhythm: Normal rate and regular rhythm.      Pulses: Normal pulses.       Heart sounds: Normal heart sounds.   Pulmonary:      Effort: Pulmonary effort is normal. No respiratory distress.      Breath sounds: Normal breath sounds.   Musculoskeletal:         General: No deformity or signs of injury. Normal range of motion.      Cervical back: Normal range of motion.   Skin:     General: Skin is warm and dry.      Findings: No rash.   Neurological:      General: No focal deficit present.      Mental Status: He is alert and oriented to person, place, and time. Mental status is at baseline.   Psychiatric:         Mood and Affect: Mood normal.         Behavior: Behavior normal.         Thought Content: Thought content normal.       Current Outpatient Medications on File Prior to Visit   Medication Sig Dispense Refill    acetaminophen (TYLENOL) 500 MG tablet Take 1,000 mg by mouth daily as needed for Pain.      aspirin (ECOTRIN) 81 MG EC tablet Take 1 tablet (81 mg total) by mouth once daily. 90 tablet 3    bismuth subsalicylate (PEPTO BISMOL) 262 mg/15 mL suspension Take 15 mLs by mouth daily as needed (diarrhea).      blood sugar diagnostic (ACCU-CHEK GUIDE TEST STRIPS) Strp 1 each by Misc.(Non-Drug; Combo Route) route 5 (five) times daily. 150 each 11    blood-glucose sensor (DEXCOM G7 SENSOR) Haley 1 Device by Misc.(Non-Drug; Combo Route) route every 10 days. 3 each 11    insulin aspart U-100 (NOVOLOG FLEXPEN U-100 INSULIN) 100 unit/mL (3 mL) InPn pen Inject 5 Units into the skin 3 (three) times daily with meals. 15 mL 2    insulin glargine U-100, Lantus, (LANTUS SOLOSTAR U-100 INSULIN) 100 unit/mL (3 mL) InPn pen Inject 25 Units into the skin every evening. 15 mL 2    insulin regular 100 unit/mL Inj injection Inject into the skin.      lancets (ACCU-CHEK FASTCLIX LANCET DRUM) Misc 1 each by Misc.(Non-Drug; Combo Route) route Daily. 30 each 11    metFORMIN (GLUCOPHAGE) 1000 MG tablet Take 1 tablet (1,000 mg total) by mouth 2 (two) times daily with meals. 180 tablet 3     "oxyCODONE-acetaminophen (PERCOCET) 7.5-325 mg per tablet Take 1 tablet by mouth every 6 (six) hours as needed.      pen needle, diabetic 31 gauge x 3/16" Ndle Inject 1 each into the skin 4 (four) times daily. 100 each 11    amLODIPine (NORVASC) 10 MG tablet Take 1 tablet (10 mg total) by mouth once daily. (Patient not taking: Reported on 1/2/2025) 90 tablet 3    atorvastatin (LIPITOR) 80 MG tablet Take 1 tablet (80 mg total) by mouth every evening. (Patient not taking: Reported on 1/2/2025) 30 tablet 6    carvediloL (COREG) 12.5 MG tablet Take 0.5 tablets (6.25 mg total) by mouth 2 (two) times daily with meals. (Patient not taking: Reported on 1/2/2025) 90 tablet 3    multivit-min/folic/vit K/lycop (MEN'S MULTIVITAMIN ORAL) Take 1 tablet by mouth once daily. (Patient not taking: Reported on 1/2/2025)      semaglutide (OZEMPIC) 0.25 mg or 0.5 mg (2 mg/3 mL) pen injector Inject 0.25 mg into the skin every 7 days. (Patient not taking: Reported on 1/2/2025) 3 mL 1     No current facility-administered medications on file prior to visit.         Assessment:       1. Hospital discharge follow-up    2. Dyslipidemia associated with type 2 diabetes mellitus    3. Class 2 severe obesity with body mass index (BMI) of 35 to 39.9 with serious comorbidity    4. Other chronic osteomyelitis, right ankle and foot    5. Ulcer of toe, right, with necrosis of bone        Plan:       Hospital discharge follow-up  Other chronic osteomyelitis, right ankle and foot  Ulcer of toe, right, with necrosis of bone   - doing very well since time hospital discharge   - all appropriate hospital follow-up from its are scheduling    Dyslipidemia associated with type 2 diabetes mellitus   - will follow along with both endocrinology and in office diabetic specialist    Class 2 severe obesity with body mass index (BMI) of 35 to 39.9 with serious comorbidity   - will continue to monitor/intervene upon weights effect on systemic organ function              "

## 2025-01-02 ENCOUNTER — OFFICE VISIT (OUTPATIENT)
Dept: INTERNAL MEDICINE | Facility: CLINIC | Age: 60
End: 2025-01-02
Payer: COMMERCIAL

## 2025-01-02 VITALS
TEMPERATURE: 97 F | RESPIRATION RATE: 18 BRPM | BODY MASS INDEX: 35.79 KG/M2 | DIASTOLIC BLOOD PRESSURE: 66 MMHG | SYSTOLIC BLOOD PRESSURE: 110 MMHG | HEIGHT: 74 IN | HEART RATE: 49 BPM | OXYGEN SATURATION: 98 % | WEIGHT: 278.88 LBS

## 2025-01-02 DIAGNOSIS — M86.671 OTHER CHRONIC OSTEOMYELITIS, RIGHT ANKLE AND FOOT: ICD-10-CM

## 2025-01-02 DIAGNOSIS — E66.812 CLASS 2 SEVERE OBESITY WITH BODY MASS INDEX (BMI) OF 35 TO 39.9 WITH SERIOUS COMORBIDITY: ICD-10-CM

## 2025-01-02 DIAGNOSIS — E11.69 DYSLIPIDEMIA ASSOCIATED WITH TYPE 2 DIABETES MELLITUS: ICD-10-CM

## 2025-01-02 DIAGNOSIS — E66.01 CLASS 2 SEVERE OBESITY WITH BODY MASS INDEX (BMI) OF 35 TO 39.9 WITH SERIOUS COMORBIDITY: ICD-10-CM

## 2025-01-02 DIAGNOSIS — L97.514 ULCER OF TOE, RIGHT, WITH NECROSIS OF BONE: ICD-10-CM

## 2025-01-02 DIAGNOSIS — E78.5 DYSLIPIDEMIA ASSOCIATED WITH TYPE 2 DIABETES MELLITUS: ICD-10-CM

## 2025-01-02 DIAGNOSIS — Z09 HOSPITAL DISCHARGE FOLLOW-UP: Primary | ICD-10-CM

## 2025-01-02 PROCEDURE — 99999 PR PBB SHADOW E&M-EST. PATIENT-LVL IV: CPT | Mod: PBBFAC,,, | Performed by: STUDENT IN AN ORGANIZED HEALTH CARE EDUCATION/TRAINING PROGRAM

## 2025-01-02 RX ORDER — OXYCODONE AND ACETAMINOPHEN 7.5; 325 MG/1; MG/1
1 TABLET ORAL EVERY 6 HOURS PRN
COMMUNITY

## 2025-01-03 ENCOUNTER — OFFICE VISIT (OUTPATIENT)
Dept: INTERNAL MEDICINE | Facility: CLINIC | Age: 60
End: 2025-01-03
Payer: COMMERCIAL

## 2025-01-03 VITALS
HEIGHT: 74 IN | WEIGHT: 281.06 LBS | SYSTOLIC BLOOD PRESSURE: 180 MMHG | OXYGEN SATURATION: 97 % | BODY MASS INDEX: 36.07 KG/M2 | DIASTOLIC BLOOD PRESSURE: 84 MMHG | HEART RATE: 74 BPM

## 2025-01-03 DIAGNOSIS — E11.65 TYPE 2 DIABETES MELLITUS WITH HYPERGLYCEMIA, WITH LONG-TERM CURRENT USE OF INSULIN: Primary | ICD-10-CM

## 2025-01-03 DIAGNOSIS — Z79.4 TYPE 2 DIABETES MELLITUS WITH HYPERGLYCEMIA, WITH LONG-TERM CURRENT USE OF INSULIN: Primary | ICD-10-CM

## 2025-01-03 DIAGNOSIS — Z97.8 USES SELF-APPLIED CONTINUOUS GLUCOSE MONITORING DEVICE: ICD-10-CM

## 2025-01-03 PROCEDURE — 99999 PR PBB SHADOW E&M-EST. PATIENT-LVL V: CPT | Mod: PBBFAC,,,

## 2025-01-03 RX ORDER — INSULIN ASPART 100 [IU]/ML
10 INJECTION, SOLUTION INTRAVENOUS; SUBCUTANEOUS
Qty: 15 ML | Refills: 2 | Status: SHIPPED | OUTPATIENT
Start: 2025-01-03 | End: 2026-01-03

## 2025-01-03 RX ORDER — INSULIN GLARGINE 100 [IU]/ML
20 INJECTION, SOLUTION SUBCUTANEOUS NIGHTLY
Qty: 15 ML | Refills: 2 | Status: SHIPPED | OUTPATIENT
Start: 2025-01-03 | End: 2026-01-03

## 2025-01-03 NOTE — PROGRESS NOTES
CHIEF COMPLAINT: Type 2 Diabetes    Referred by PCP: Dr. Ruiz  Previously managed by: BRET Rivera NP  CDE person of contact (if needed): Joanne Hooker     HPI: Mr. Billy Sheffield Miguel is a 59 y.o. male who was diagnosed with Type 2 DM 15 years ago.     Last A1c 10.2% (11/2024)    Hx of hospitalization for DM? No   Hx of HTN? Yes  Hx of CKD? No   Hx of CHF? No   Hx of CVA? No   Hx of MI? Yes   Hx of Pancreatitis? 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 Yes, exam UTD.  Hx of Long term steroid use? No    Previous was on Medtronic 780g pump with Novolin R, but no longer able to afford supplies. Plan to re-start his previous insulin (MDI) regimen along with Ozempic. Attempted [facility], but does not qualify for service due to commercial insurance.    S/p right 2nd metatarsal head excision and ulcer debridement on 12/6/2024. Subsequently admitted for severe right foot infection, recently discharged on 12/18/2024. Currently receiving home abx therapy via PICC line. He's also receiving  HH for wound care.         VISIT SUMMARY:  Provided patient with Dexcom G7  and sensors upon last visit. Here today for CGM review. Reports has his G7 sensors, Lantus and Novolog supply, but not start Ozempic due $900.00 co-pay. BG during visit 312, Dexcom Av , on Novolin 70/30. Still getting abx therapy via PICC and HH for wound care 3xs / wk. Reports taking metformin on an empty stomach. Discussed Ozempic savings card program.  Reports received a letter from his insurance stating Jardiance is not covered.       PREVIOUS DIABETES MEDICATIONS TRIED  Ozempic- cost  Lantus- cost   Fiasp-cost  Novolog / Humalog / Lispro - cost  Metformin- GI upset      CURRENT DIABETIC MEDS:   Novolin 70/30 (42/36)    Diabetes Management Status:  Statin: Taking  ACE/ARB: Taking    Eye exam due: 10/15/2025  Foot exam due: UTD, see podiatry    Screening or Prevention Patient's value Goal Complete/Controlled?   HgA1C Testing and Control   Lab  "Results   Component Value Date    HGBA1C 10.2 (H) 11/27/2024      Annually/Less than 8% No   Lipid profile : 11/27/2024 Annually No   LDL control Lab Results   Component Value Date    LDLCALC 123.0 11/27/2024    Annually/Less than 100 mg/dl  No   Nephropathy screening Lab Results   Component Value Date    LABMICR 1381.0 11/27/2024     Lab Results   Component Value Date    PROTEINUA 2+ (A) 12/12/2024    Annually No   Blood pressure BP Readings from Last 1 Encounters:   01/03/25 (!) 180/84    Less than 140/90 No   Dilated retinal exam : 10/15/2024 Annually No   Foot exam   Deferred; UTD Annually Yes     REVIEW OF SYSTEMS  General: Denies weakness, fatigue, or weight changes.   Eyes: Denies double or blurred vision, eye pain, or redness.  Cardiovascular: Denies CP, palpitations, or edema.   Respiratory: Denies cough or dyspnea.   GI: Denies heartburn, n/v, or changes in bowel patterns.   Skin: Denies rash, lesions, itching, or injection site problems.  Neuro: Denies severe Has, numbness, tingling, tremors, or vertigo.   Endocrine: Denies polyuria, polydipsia, polyphagia, heat or cold intolerance.     Vital Signs  BP (!) 180/84 (BP Location: Left arm, Patient Position: Sitting)   Pulse 74   Ht 6' 2" (1.88 m)   Wt 127.5 kg (281 lb 1.4 oz)   SpO2 97%   BMI 36.09 kg/m²     Hemoglobin A1C   Date Value Ref Range Status   11/27/2024 10.2 (H) 4.0 - 5.6 % Final     Comment:     ADA Screening Guidelines:  5.7-6.4%  Consistent with prediabetes  >or=6.5%  Consistent with diabetes    High levels of fetal hemoglobin interfere with the HbA1C  assay. Heterozygous hemoglobin variants (HbS, HgC, etc)do  not significantly interfere with this assay.   However, presence of multiple variants may affect accuracy.     07/20/2023 8.7 (H) 4.0 - 5.6 % Final     Comment:     ADA Screening Guidelines:  5.7-6.4%  Consistent with prediabetes  >or=6.5%  Consistent with diabetes    High levels of fetal hemoglobin interfere with the HbA1C  assay. " Heterozygous hemoglobin variants (HbS, HgC, etc)do  not significantly interfere with this assay.   However, presence of multiple variants may affect accuracy.     04/06/2023 9.4 (H) 4.0 - 5.6 % Final     Comment:     ADA Screening Guidelines:  5.7-6.4%  Consistent with prediabetes  >or=6.5%  Consistent with diabetes    High levels of fetal hemoglobin interfere with the HbA1C  assay. Heterozygous hemoglobin variants (HbS, HgC, etc)do  not significantly interfere with this assay.   However, presence of multiple variants may affect accuracy.          Chemistry        Component Value Date/Time     12/30/2024 1429    K 4.2 12/30/2024 1429     12/30/2024 1429    CO2 24 12/30/2024 1429    BUN 13 12/30/2024 1429    CREATININE 1.0 12/30/2024 1429     (H) 12/30/2024 1429        Component Value Date/Time    CALCIUM 8.7 12/30/2024 1429    ALKPHOS 131 12/30/2024 1429    AST 26 12/30/2024 1429    ALT 14 12/30/2024 1429    BILITOT 0.3 12/30/2024 1429    ESTGFRAFRICA >60.0 06/10/2022 0246    EGFRNONAA >60.0 06/10/2022 0246           Lab Results   Component Value Date    TSH 1.990 01/26/2022      Lab Results   Component Value Date    CHOL 220 (H) 11/27/2024    CHOL 135 07/20/2023    CHOL 151 04/06/2023     Lab Results   Component Value Date    HDL 35 (L) 11/27/2024    HDL 39 (L) 07/20/2023    HDL 35 (L) 04/06/2023     Lab Results   Component Value Date    LDLCALC 123.0 11/27/2024    LDLCALC 71.0 07/20/2023    LDLCALC 89.2 04/06/2023     Lab Results   Component Value Date    TRIG 310 (H) 11/27/2024    TRIG 125 07/20/2023    TRIG 134 04/06/2023     Lab Results   Component Value Date    CHOLHDL 15.9 (L) 11/27/2024    CHOLHDL 28.9 07/20/2023    CHOLHDL 23.2 04/06/2023         PHYSICAL EXAMINATION  Constitutional: Appears well, no distress.  Eyes: Sclera white, PERRLA, EOMs intact.  Neck: Supple, trachea midline.   Respiratory: No cough, SOB, nor HAIDER.  Cardiovascular: RRR; no edema.  Skin: Warm and dry; no rash,  lesions, acanthosis nigricans, nor injection site reactions.  Neuro: AAOx4, steady gait  Psychiatric: No unusual, disruptive, or inappropriate behavior.       Assessment & Plan       1. Type 2 diabetes mellitus with hyperglycemia, with long-term current use of insulin  insulin aspart U-100 (NOVOLOG FLEXPEN U-100 INSULIN) 100 unit/mL (3 mL) InPn pen    insulin glargine U-100, Lantus, (LANTUS SOLOSTAR U-100 INSULIN) 100 unit/mL (3 mL) InPn pen    -Has Dexcom G7 CGM.  -Reviewed  A1c 10.2% .   -Discussed A1c goal <7%.  -Discussed lifestyle changes.  -Discussed importance of getting diabetes better control to prevent delayed wound healing. and  further post op complications.  -Stop Novolin 70/30.  -Cont. Metformin 1000 mg BID wm.   -Reinforced to take Metformin with meals only.  -Start Lantus 20u at bedtime. Discussed MOA, SE, and dosage.  -Start Novolog 10u AC. Discussed MOA, SE, and dosage.  -Start Ozempic 0.25mg weekly. Discussed MOA, SE, and dosage.  -Assisted with applying for Ozempic savings card.  -Discussed goal to wean insulin to a minimal dose w/o experiencing hypo or hyperglycemia.         2. Uses self-applied continuous glucose monitoring device  Dexcom G7 readings reviewed with patient.  -See readings below.  -Re-eval upon next visit.           FOLLOW UP  Follow up in about 1 month (around 2/3/2025).

## 2025-01-03 NOTE — PATIENT INSTRUCTIONS
Continue monitoring your blood sugars using your Dexcom G7.    Stop Novolin 70/30.    Start Ozempic 0.25mg weekly when available.  Call another local pharmacy to see if they have Ozempic 0.25mg pen. If so, have your pharmacy and have them forward over your prescription.     Start Lantus 20 units at bedtime.    Start Novolog 10 units with meals. If you don't eat, do not take Novolog.  Make sure your meal is available before taking Novolog.    Do not take Novolog just to bring your blood sugar down if it's too high.     Continue taking Metformin 1000mg with breakfast and dinner. If you do not eat breakfast or dinner. Do not take Metformin on an empty stomach.     Notify the office if you start to experience hypoglycemia.     F/u in 1 month.

## 2025-01-05 ENCOUNTER — TELEPHONE (OUTPATIENT)
Dept: PHARMACY | Facility: CLINIC | Age: 60
End: 2025-01-05
Payer: COMMERCIAL

## 2025-01-05 NOTE — TELEPHONE ENCOUNTER
[facility]/Population Health Chart Review & Patient Outreach Details For Medication Adherence Project    Reason for Outreach Encounter: 3rd Party payor non-compliance report ([employer], [employer], [employer], etc)  2.  Patient Outreach Method: Reviewed Patient Chart  3.   Medication in question: atorvastatin   LAST FILLED: n/a  Hyperlipidemia Medications               atorvastatin (LIPITOR) 80 MG tablet Take 1 tablet (80 mg total) by mouth every evening.               4.  Reviewed and or Updates Made To: Patient Chart  5. Outreach Outcomes and/or actions taken: Medication discontinued    Additional Notes:

## 2025-01-06 ENCOUNTER — LAB VISIT (OUTPATIENT)
Dept: LAB | Facility: HOSPITAL | Age: 60
End: 2025-01-06
Attending: STUDENT IN AN ORGANIZED HEALTH CARE EDUCATION/TRAINING PROGRAM
Payer: COMMERCIAL

## 2025-01-06 DIAGNOSIS — B95.61 METHICILLIN SUSCEPTIBLE STAPHYLOCOCCUS AUREUS INFECTION AS THE CAUSE OF DISEASES CLASSIFIED ELSEWHERE: Primary | ICD-10-CM

## 2025-01-06 DIAGNOSIS — B95.61 METHICILLIN SUSCEPTIBLE STAPHYLOCOCCUS AUREUS INFECTION AS THE CAUSE OF DISEASES CLASSIFIED ELSEWHERE: ICD-10-CM

## 2025-01-06 PROCEDURE — 85025 COMPLETE CBC W/AUTO DIFF WBC: CPT | Performed by: STUDENT IN AN ORGANIZED HEALTH CARE EDUCATION/TRAINING PROGRAM

## 2025-01-06 PROCEDURE — 86140 C-REACTIVE PROTEIN: CPT | Performed by: STUDENT IN AN ORGANIZED HEALTH CARE EDUCATION/TRAINING PROGRAM

## 2025-01-06 PROCEDURE — 80053 COMPREHEN METABOLIC PANEL: CPT | Performed by: STUDENT IN AN ORGANIZED HEALTH CARE EDUCATION/TRAINING PROGRAM

## 2025-01-07 DIAGNOSIS — D64.9 ANEMIA, UNSPECIFIED TYPE: Primary | ICD-10-CM

## 2025-01-07 LAB
ALBUMIN SERPL BCP-MCNC: 3 G/DL (ref 3.5–5.2)
ALP SERPL-CCNC: 149 U/L (ref 40–150)
ALT SERPL W/O P-5'-P-CCNC: 13 U/L (ref 10–44)
ANION GAP SERPL CALC-SCNC: 14 MMOL/L (ref 8–16)
AST SERPL-CCNC: 26 U/L (ref 10–40)
BASOPHILS # BLD AUTO: 0.04 K/UL (ref 0–0.2)
BASOPHILS NFR BLD: 0.4 % (ref 0–1.9)
BILIRUB SERPL-MCNC: 0.4 MG/DL (ref 0.1–1)
BUN SERPL-MCNC: 16 MG/DL (ref 6–20)
CALCIUM SERPL-MCNC: 8.9 MG/DL (ref 8.7–10.5)
CHLORIDE SERPL-SCNC: 100 MMOL/L (ref 95–110)
CO2 SERPL-SCNC: 22 MMOL/L (ref 23–29)
CREAT SERPL-MCNC: 0.9 MG/DL (ref 0.5–1.4)
CRP SERPL-MCNC: 9.2 MG/L (ref 0–8.2)
DIFFERENTIAL METHOD BLD: ABNORMAL
EOSINOPHIL # BLD AUTO: 0.2 K/UL (ref 0–0.5)
EOSINOPHIL NFR BLD: 2.5 % (ref 0–8)
ERYTHROCYTE [DISTWIDTH] IN BLOOD BY AUTOMATED COUNT: 14 % (ref 11.5–14.5)
EST. GFR  (NO RACE VARIABLE): >60 ML/MIN/1.73 M^2
GLUCOSE SERPL-MCNC: 193 MG/DL (ref 70–110)
HCT VFR BLD AUTO: 23.4 % (ref 40–54)
HGB BLD-MCNC: 7.7 G/DL (ref 14–18)
IMM GRANULOCYTES # BLD AUTO: 0.05 K/UL (ref 0–0.04)
IMM GRANULOCYTES NFR BLD AUTO: 0.5 % (ref 0–0.5)
LYMPHOCYTES # BLD AUTO: 1.7 K/UL (ref 1–4.8)
LYMPHOCYTES NFR BLD: 17.2 % (ref 18–48)
MCH RBC QN AUTO: 28.6 PG (ref 27–31)
MCHC RBC AUTO-ENTMCNC: 32.9 G/DL (ref 32–36)
MCV RBC AUTO: 87 FL (ref 82–98)
MONOCYTES # BLD AUTO: 1.2 K/UL (ref 0.3–1)
MONOCYTES NFR BLD: 12.6 % (ref 4–15)
NEUTROPHILS # BLD AUTO: 6.5 K/UL (ref 1.8–7.7)
NEUTROPHILS NFR BLD: 66.8 % (ref 38–73)
NRBC BLD-RTO: 0 /100 WBC
PLATELET # BLD AUTO: 334 K/UL (ref 150–450)
PMV BLD AUTO: 10.8 FL (ref 9.2–12.9)
POTASSIUM SERPL-SCNC: 4.2 MMOL/L (ref 3.5–5.1)
PROT SERPL-MCNC: 7.5 G/DL (ref 6–8.4)
RBC # BLD AUTO: 2.69 M/UL (ref 4.6–6.2)
SODIUM SERPL-SCNC: 136 MMOL/L (ref 136–145)
WBC # BLD AUTO: 9.76 K/UL (ref 3.9–12.7)

## 2025-01-08 ENCOUNTER — OFFICE VISIT (OUTPATIENT)
Dept: INFECTIOUS DISEASES | Facility: CLINIC | Age: 60
End: 2025-01-08
Payer: COMMERCIAL

## 2025-01-08 ENCOUNTER — PATIENT MESSAGE (OUTPATIENT)
Dept: INTERNAL MEDICINE | Facility: CLINIC | Age: 60
End: 2025-01-08
Payer: COMMERCIAL

## 2025-01-08 VITALS
HEIGHT: 74 IN | HEART RATE: 77 BPM | BODY MASS INDEX: 35.23 KG/M2 | WEIGHT: 274.5 LBS | DIASTOLIC BLOOD PRESSURE: 97 MMHG | SYSTOLIC BLOOD PRESSURE: 171 MMHG | TEMPERATURE: 98 F

## 2025-01-08 DIAGNOSIS — L97.514 ULCER OF TOE, RIGHT, WITH NECROSIS OF BONE: ICD-10-CM

## 2025-01-08 DIAGNOSIS — B95.8 BACTERIAL INFECTION DUE TO STAPHYLOCOCCUS: Primary | ICD-10-CM

## 2025-01-08 PROCEDURE — 99999 PR PBB SHADOW E&M-EST. PATIENT-LVL V: CPT | Mod: PBBFAC,,, | Performed by: PHYSICIAN ASSISTANT

## 2025-01-08 NOTE — PROGRESS NOTES
Subjective:      Patient ID: Billy Rivera is a 59 y.o. male.    Chief Complaint:Follow-up      History of Present Illness    HPI    Billy Rivera is a 59 year old male with insulin dependent diabetes, CAD, HLD, left foot osteomyelitis requiring toe amputations who was admitted in December with MSSA bacteremia and right diabetic foot infection. He is seen today for follow up. He has a hx of R foot infection after recent 2nd metatarsal head resection on 12/5/24 with podiatry. Sent home with PO Clinda and Cipro. Started to have weakness, chills, and nausea 5 days after surgery. Presented to ER 12/13/24 and found to have purulence from incision site.     MRI R forefoot with cellulitis and high likelihood of osteomyelitis of 2nd metatarsal shaft and 3rd metatarsal shaft. Bone culture and blood cx 12/13 grew MSSA. TTE 12/15/24 without vegetations. Podiatry discussed R TMA with patient, which patient declined.  U/S with VASILIY 12/17 shows no hemodynamically significant stenosis with Right and Left VASILIY of 1.3. he was discharged on Cefazolin x 6 weeks (EOC 1/26/24).     He is overall doing well. Tolerating Cefazolin. No PICC issues. No fevers, chills, sweats, nausea, vomiting, diarrhea. He feels his wound is improving. Sees podiatry tomorrow. Also receiving home health wound care.      Review of Systems   Constitutional: Negative for chills, decreased appetite, fever, malaise/fatigue, night sweats, weight gain and weight loss.   HENT:  Negative for congestion, ear pain, hearing loss, hoarse voice, sore throat and tinnitus.    Eyes:  Negative for blurred vision, redness and visual disturbance.   Cardiovascular:  Negative for chest pain, leg swelling and palpitations.   Respiratory:  Negative for cough, hemoptysis, shortness of breath, sputum production and wheezing.    Hematologic/Lymphatic: Negative for adenopathy. Does not bruise/bleed easily.   Skin:  Positive for dry skin, poor wound healing and suspicious  "lesions. Negative for itching and rash.   Musculoskeletal:  Negative for back pain, joint pain, myalgias and neck pain.   Gastrointestinal:  Negative for abdominal pain, constipation, diarrhea, heartburn, nausea and vomiting.   Genitourinary:  Negative for dysuria, flank pain, frequency, hematuria, hesitancy and urgency.   Neurological:  Positive for numbness and paresthesias. Negative for dizziness, headaches and weakness.   Psychiatric/Behavioral:  Negative for depression and memory loss. The patient does not have insomnia and is not nervous/anxious.    Allergic/Immunologic: Negative for environmental allergies, HIV exposure, hives and persistent infections.     Objective:     Vitals:    01/08/25 0835   BP: (!) 171/97   Pulse: 77   Temp: 98.1 °F (36.7 °C)   TempSrc: Oral   Weight: 124.5 kg (274 lb 7.6 oz)   Height: 6' 2" (1.88 m)   PainSc: 0-No pain     Physical Exam  Vitals reviewed.   Constitutional:       General: He is not in acute distress.     Appearance: Normal appearance. He is well-developed. He is not ill-appearing or diaphoretic.   HENT:      Head: Normocephalic and atraumatic.      Right Ear: External ear normal.      Left Ear: External ear normal.      Nose: Nose normal.   Eyes:      General: No scleral icterus.        Right eye: No discharge.         Left eye: No discharge.      Extraocular Movements: Extraocular movements intact.      Conjunctiva/sclera: Conjunctivae normal.   Pulmonary:      Effort: Pulmonary effort is normal. No respiratory distress.      Breath sounds: No stridor.   Skin:     General: Skin is warm and dry.      Coloration: Skin is not jaundiced or pale.      Findings: Lesion present. No erythema.      Comments: Dry flaky skin to foot  Plantar foot callus.   Dorsal foot wound. No malodor or drainage. See photo  PICC c/d/i   Neurological:      General: No focal deficit present.      Mental Status: He is alert and oriented to person, place, and time. Mental status is at baseline. "   Psychiatric:         Mood and Affect: Mood normal.         Behavior: Behavior normal.         Thought Content: Thought content normal.         Judgment: Judgment normal.                   WBC   Date Value Ref Range Status   01/06/2025 9.76 3.90 - 12.70 K/uL Final   12/30/2024 6.12 3.90 - 12.70 K/uL Final   12/23/2024 6.46 3.90 - 12.70 K/uL Final     Hemoglobin   Date Value Ref Range Status   01/06/2025 7.7 (L) 14.0 - 18.0 g/dL Final   12/30/2024 12.7 (L) 14.0 - 18.0 g/dL Final   12/23/2024 13.1 (L) 14.0 - 18.0 g/dL Final     Hematocrit   Date Value Ref Range Status   01/06/2025 23.4 (L) 40.0 - 54.0 % Final   12/30/2024 39.7 (L) 40.0 - 54.0 % Final   12/23/2024 40.2 40.0 - 54.0 % Final     Platelets   Date Value Ref Range Status   01/06/2025 334 150 - 450 K/uL Final   12/30/2024 277 150 - 450 K/uL Final   12/23/2024 349 150 - 450 K/uL Final     Sodium   Date Value Ref Range Status   01/06/2025 136 136 - 145 mmol/L Final   12/30/2024 137 136 - 145 mmol/L Final   12/23/2024 135 (L) 136 - 145 mmol/L Final     Potassium   Date Value Ref Range Status   01/06/2025 4.2 3.5 - 5.1 mmol/L Final   12/30/2024 4.2 3.5 - 5.1 mmol/L Final   12/23/2024 4.0 3.5 - 5.1 mmol/L Final     Chloride   Date Value Ref Range Status   01/06/2025 100 95 - 110 mmol/L Final   12/30/2024 103 95 - 110 mmol/L Final   12/23/2024 100 95 - 110 mmol/L Final     CO2   Date Value Ref Range Status   01/06/2025 22 (L) 23 - 29 mmol/L Final   12/30/2024 24 23 - 29 mmol/L Final   12/23/2024 27 23 - 29 mmol/L Final     BUN   Date Value Ref Range Status   01/06/2025 16 6 - 20 mg/dL Final   12/30/2024 13 6 - 20 mg/dL Final   12/23/2024 16 6 - 20 mg/dL Final     Creatinine   Date Value Ref Range Status   01/06/2025 0.9 0.5 - 1.4 mg/dL Final   12/30/2024 1.0 0.5 - 1.4 mg/dL Final   12/23/2024 1.0 0.5 - 1.4 mg/dL Final     Calcium   Date Value Ref Range Status   01/06/2025 8.9 8.7 - 10.5 mg/dL Final   12/30/2024 8.7 8.7 - 10.5 mg/dL Final   12/23/2024 8.9 8.7 -  10.5 mg/dL Final     Total Protein   Date Value Ref Range Status   01/06/2025 7.5 6.0 - 8.4 g/dL Final   12/30/2024 7.4 6.0 - 8.4 g/dL Final   12/23/2024 7.1 6.0 - 8.4 g/dL Final     Total Bilirubin   Date Value Ref Range Status   01/06/2025 0.4 0.1 - 1.0 mg/dL Final     Comment:     For infants and newborns, interpretation of results should be based  on gestational age, weight and in agreement with clinical  observations.    Premature Infant recommended reference ranges:  Up to 24 hours.............<8.0 mg/dL  Up to 48 hours............<12.0 mg/dL  3-5 days..................<15.0 mg/dL  6-29 days.................<15.0 mg/dL     12/30/2024 0.3 0.1 - 1.0 mg/dL Final     Comment:     For infants and newborns, interpretation of results should be based  on gestational age, weight and in agreement with clinical  observations.    Premature Infant recommended reference ranges:  Up to 24 hours.............<8.0 mg/dL  Up to 48 hours............<12.0 mg/dL  3-5 days..................<15.0 mg/dL  6-29 days.................<15.0 mg/dL     12/23/2024 0.3 0.1 - 1.0 mg/dL Final     Comment:     For infants and newborns, interpretation of results should be based  on gestational age, weight and in agreement with clinical  observations.    Premature Infant recommended reference ranges:  Up to 24 hours.............<8.0 mg/dL  Up to 48 hours............<12.0 mg/dL  3-5 days..................<15.0 mg/dL  6-29 days.................<15.0 mg/dL       Alkaline Phosphatase   Date Value Ref Range Status   01/06/2025 149 40 - 150 U/L Final   12/30/2024 131 40 - 150 U/L Final   12/23/2024 107 40 - 150 U/L Final     ALT   Date Value Ref Range Status   01/06/2025 13 10 - 44 U/L Final   12/30/2024 14 10 - 44 U/L Final   12/23/2024 13 10 - 44 U/L Final     AST   Date Value Ref Range Status   01/06/2025 26 10 - 40 U/L Final   12/30/2024 26 10 - 40 U/L Final   12/23/2024 18 10 - 40 U/L Final     Sed Rate   Date Value Ref Range Status   12/12/2024 83  (H) 0 - 23 mm/Hr Final   11/22/2022 56 (H) 0 - 23 mm/Hr Final   06/08/2022 78 (H) 0 - 23 mm/Hr Final     CRP   Date Value Ref Range Status   01/06/2025 9.2 (H) 0.0 - 8.2 mg/L Final   12/30/2024 8.0 0.0 - 8.2 mg/L Final   12/23/2024 18.9 (H) 0.0 - 8.2 mg/L Final     Results for orders placed or performed during the hospital encounter of 12/12/24   VANCOMYCIN, TROUGH    Collection Time: 12/14/24  8:14 AM   Result Value Ref Range    Vancomycin-Trough 11.2 10.0 - 22.0 ug/mL       Assessment:       1. Bacterial infection due to Staphylococcus    2. Ulcer of toe, right, with necrosis of bone        Plan:   Continue IV Cefazolin x 6 weeks as planned (EOC 1/26/24)  Weekly CRP, CBC, CMP to be drawn with results faxed to the ID Department at  [phone]  Continue local wound care. Follow up with podiatry tomorrow as planned.  ID follow up scheduled for 1/24. Will plan to remove PICC line at visit if he is doing well without infectious concerns  The patient understands and agrees with the plan of care. All questions were answered.           30 minutes of total time was spent on this encounter, which includes face to face time and non-face to face time preparing to see the patient (eg, review of tests), Obtaining and/or reviewing separately obtained history, documenting clinical information in the electronic or other health record, independently interpreting results (not separately reported) and communicating results to the patient/family/caregiver, or care coordination (not separately reported).

## 2025-01-09 ENCOUNTER — OFFICE VISIT (OUTPATIENT)
Dept: PODIATRY | Facility: CLINIC | Age: 60
End: 2025-01-09
Payer: COMMERCIAL

## 2025-01-09 VITALS
BODY MASS INDEX: 35.24 KG/M2 | HEIGHT: 74 IN | HEART RATE: 74 BPM | SYSTOLIC BLOOD PRESSURE: 169 MMHG | DIASTOLIC BLOOD PRESSURE: 80 MMHG

## 2025-01-09 DIAGNOSIS — L97.514 ULCER OF TOE, RIGHT, WITH NECROSIS OF BONE: Primary | ICD-10-CM

## 2025-01-09 PROCEDURE — 99024 POSTOP FOLLOW-UP VISIT: CPT | Mod: S$GLB,,, | Performed by: PODIATRIST

## 2025-01-09 PROCEDURE — 99999 PR PBB SHADOW E&M-EST. PATIENT-LVL IV: CPT | Mod: PBBFAC,,, | Performed by: PODIATRIST

## 2025-01-09 PROCEDURE — 3079F DIAST BP 80-89 MM HG: CPT | Mod: CPTII,S$GLB,, | Performed by: PODIATRIST

## 2025-01-09 PROCEDURE — 11042 DBRDMT SUBQ TIS 1ST 20SQCM/<: CPT | Mod: 79,S$GLB,, | Performed by: PODIATRIST

## 2025-01-09 PROCEDURE — 3077F SYST BP >= 140 MM HG: CPT | Mod: CPTII,S$GLB,, | Performed by: PODIATRIST

## 2025-01-09 PROCEDURE — 1160F RVW MEDS BY RX/DR IN RCRD: CPT | Mod: CPTII,S$GLB,, | Performed by: PODIATRIST

## 2025-01-09 PROCEDURE — 1159F MED LIST DOCD IN RCRD: CPT | Mod: CPTII,S$GLB,, | Performed by: PODIATRIST

## 2025-01-09 NOTE — DISCHARGE SUMMARY
DISCHARGE SUMMARY  Hospital Medicine    Team: [facility] HOSP MED W    Patient Name: Billy Rivera  YOB: 1965    Admit Date: 12/12/2024    Discharge Date: 12/18/2024    Discharge Attending Physician: Katerine Tomlin     Admitting Resident    Diagnoses:  Active Hospital Problems    Diagnosis  POA    *Diabetic foot ulcer associated with diabetes mellitus due to underlying condition [E08.621, L97.509]  Yes    Bacterial infection due to Staphylococcus [B95.8]  Yes    JOVANNI (acute kidney injury) [N17.9]  Yes    Obesity [E66.9]  Yes    Subacute osteomyelitis of right foot [M86.271]  Yes    Pyogenic inflammation of bone [M86.9]  Yes    Hx of colon cancer, stage I [Z85.038]  Yes    Hyponatremia [E87.1]  Yes    Type 2 diabetes mellitus 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, with long-term current use of insulin [E11.3393, Z79.4]  Not Applicable    Essential hypertension [I10]  Yes      Resolved Hospital Problems    Diagnosis Date Resolved POA    Acute osteomyelitis of left foot [M86.172] 10/13/2020 Yes       Discharged Condition: admit problems have stabilized       Patient seen and examined on date of discharge. 45 min spent on face to face time and medical decision  making.     Physical Exam  Vitals and nursing note reviewed.   Constitutional:       General: He is not in acute distress.     Appearance: He is well-developed. He is obese.   HENT:      Head: Normocephalic and atraumatic.      Mouth/Throat:      Pharynx: No oropharyngeal exudate.   Eyes:      General: No scleral icterus.     Conjunctiva/sclera: Conjunctivae normal.   Cardiovascular:      Rate and Rhythm: Normal rate and regular rhythm.      Heart sounds: Normal heart sounds.   Pulmonary:      Effort: Pulmonary effort is normal. No respiratory distress.      Breath sounds: Normal breath sounds. No wheezing.   Abdominal:      General: Bowel sounds are normal. There is no distension.      Palpations:  Abdomen is soft.      Tenderness: There is no abdominal tenderness.   Musculoskeletal:         General: No tenderness. Normal range of motion.      Cervical back: Normal range of motion and neck supple.   Lymphadenopathy:      Cervical: No cervical adenopathy.   Skin:    HOSPITAL COURSE:      Initial Presentation:    Billy Rivera is a 59 y.o. male with a PMHx of T2DM, CAD, HTN, HLD, and right foot wound who [place] [facility]. Patient had a chronic right foot wound for which he follows with podiatry. He had surgery intervention on his R metatarsal on 12/5. He was prescribed PO clinda and cipro prophylactially following the surgery. He reports felling generally unwell with chills, decreased appetite,generalized weakness since Tueaday. Reports falling earlier today due to generalized weakness, but was able to catch himself with his arms. No head trauma, neck/back pain or LOC. His R foot has been wrapped since the surgery so he hasn't look at his surgical incision until bandage was taken off in the ED. He has purulent drainage from incision site. Denies any LE wound, chest pain, SOB, N/V, abdominal pain or syncope.      ED: AFVSS. No leukocytosis. Na 130, , Cr 1.4 (baseline). XR, MRI pending. Given IV vanc and cipro.     Course of Principle Problem for Admission:    Diabetic foot ulcer associated with diabetes mellitus due to underlying condition  - afebrile without leukocytosis  - inflammatory markers elevated  - XR, MRI confirms osteomyelitis of metatarsal 2 and 3  - on  IV vanc and cipro  - 12/13 - podiatry debrided and took bone Bx. - Staph Aureus - MSSA  Index blood culture 12/13 with MSSA.  Follow up repeat blood cultures 12/15, no growth x2 day  Continue IV Ancef for MSSA bacteremia and osteo of R foot with staph aureus for a duration of 6 weeks; start date of cleared blood cultures (12/15/2024) and EOC 1/26/2025.    CONSULTS: Infectious disease, Podiatry    Last  CBC/BMP/HgbA1c (if applicable):  Recent Results (from the past 2 weeks)   CBC Auto Differential    Collection Time: 01/06/25 11:45 AM   Result Value Ref Range    WBC 9.76 3.90 - 12.70 K/uL    Hemoglobin 7.7 (L) 14.0 - 18.0 g/dL    Hematocrit 23.4 (L) 40.0 - 54.0 %    Platelets 334 150 - 450 K/uL   CBC Auto Differential    Collection Time: 12/30/24  2:29 PM   Result Value Ref Range    WBC 6.12 3.90 - 12.70 K/uL    Hemoglobin 12.7 (L) 14.0 - 18.0 g/dL    Hematocrit 39.7 (L) 40.0 - 54.0 %    Platelets 277 150 - 450 K/uL     No results found for this or any previous visit (from the past 2 weeks).  Lab Results   Component Value Date    HGBA1C 10.2 (H) 11/27/2024         Disposition:  Home health      Future Scheduled Appointments:  Future Appointments   Date Time Provider Department Center   1/9/2025  8:00 AM Mingo Melara DPM [facility] POD [place] [facility]   1/24/2025 11:00 AM Saadia Carlos PA-C [facility] ID [place]   2/3/2025 10:00 AM Peterson Rivera FNP-C [facility] IM [place]   3/13/2025  2:30 PM Yunier Avendaño PA-C [facility] DERM [place]   3/25/2025  9:00 AM Chandrika Boogie, AMY [facility] ENDODIA [place]       Follow-up Plans from This Hospitalization:  ID, podiatry    Discharge Medication List:         Medication List        CHANGE how you take these medications      atorvastatin 80 MG tablet  Commonly known as: LIPITOR  Take 1 tablet (80 mg total) by mouth every evening.  What changed: Another medication with the same name was removed. Continue taking this medication, and follow the directions you see here.     carvediloL 12.5 MG tablet  Commonly known as: COREG  Take 0.5 tablets (6.25 mg total) by mouth 2 (two) times daily with meals.  What changed: Another medication with the same name was removed. Continue taking this medication, and follow the directions you see here.            CONTINUE taking these medications      acetaminophen 500 MG tablet  Commonly known as: TYLENOL     amLODIPine 10 MG tablet  Commonly known  "as: NORVASC  Take 1 tablet (10 mg total) by mouth once daily.     aspirin 81 MG EC tablet  Commonly known as: ECOTRIN  Take 1 tablet (81 mg total) by mouth once daily.     bismuth subsalicylate 262 mg/15 mL suspension  Commonly known as: PEPTO BISMOL     blood sugar diagnostic Strp  Commonly known as: ACCU-CHEK GUIDE TEST STRIPS  1 each by Misc.(Non-Drug; Combo Route) route 5 (five) times daily.     lancets Misc  Commonly known as: ACCU-CHEK FASTCLIX LANCET DRUM  1 each by Misc.(Non-Drug; Combo Route) route Daily.     MEN'S MULTIVITAMIN ORAL     metFORMIN 1000 MG tablet  Commonly known as: GLUCOPHAGE  Take 1 tablet (1,000 mg total) by mouth 2 (two) times daily with meals.     pen needle, diabetic 31 gauge x 3/16" Ndle  Inject 1 each into the skin 4 (four) times daily.            STOP taking these medications      ciprofloxacin HCl 500 MG tablet  Commonly known as: CIPRO     clindamycin 300 MG capsule  Commonly known as: CLEOCIN     doxycycline 100 MG Cap  Commonly known as: VIBRAMYCIN     insulin regular 100 unit/mL injection     levoFLOXacin 750 MG tablet  Commonly known as: LEVAQUIN     lisinopriL 40 MG tablet  Commonly known as: PRINIVIL,ZESTRIL     MULTIPLE VITAMIN ORAL     oxyCODONE-acetaminophen 7.5-325 mg per tablet  Commonly known as: PERCOCET     OZEMPIC 0.25 mg or 0.5 mg (2 mg/3 mL) pen injector  Generic drug: semaglutide              Patient Instructions:  Discharge Procedure Orders   Ambulatory referral/consult to Podiatry   Standing Status: Future   Referral Priority: Routine Referral Type: Consultation   Referral Reason: Specialty Services Required   Requested Specialty: Podiatry   Number of Visits Requested: 1     Ambulatory referral/consult to Infectious Disease   Standing Status: Future   Referral Priority: Routine Referral Type: Consultation   Referral Reason: Specialty Services Required   Requested Specialty: Infectious Diseases   Number of Visits Requested: 1       Signing Physician:  Katerine ANGEL" MD Sada

## 2025-01-13 NOTE — PROGRESS NOTES
"  Patient presents status post metatarsal head resection right foot.  Patient had a postoperative infection, with dehiscence of the dorsal wound status post metatarsal head resection right foot.  The ulceration to the plantar aspect of the foot is healed however there is a 2 cm x 0.5 cm wide 0.2 cm full-thickness ulceration dorsally.  Wound is granular.  These are post debridement measurements.  Otherwise no complaints.  Follow-up in 1 week.    Wound Debridement    Performed by: Mingo Melara DPM   Authorized by: Patient    Time out: Immediately prior to procedure a "time out" was called to verify the correct patient, procedure, equipment, support staff and site/side marked as required.   Consent Done?:  Yes (Verbal)  Local anesthesia used?: No       Wound Details:    Location:  Right foot     Type of Debridement:  Excisional       Length (cm):  2 cm       Width (cm):  0.5 cm       Depth (cm):  0.2 cm       Percent Debrided (%):  100             Depth of debridement:  Subcutaneous tissue    Tissue debrided:  Dermis, Epidermis and Subcutaneous    Devitalized tissue debrided:  Biofilm, Callus and Necrotic/Eschar    Instruments:  Blade, Curette and Nippers     Bleeding:  Minimal  Hemostasis Achieved: Yes    Method Used:  Pressure  Patient tolerance:  Patient tolerated the procedure well with no immediate complications    "

## 2025-01-14 ENCOUNTER — PATIENT MESSAGE (OUTPATIENT)
Dept: INFECTIOUS DISEASES | Facility: HOSPITAL | Age: 60
End: 2025-01-14
Payer: COMMERCIAL

## 2025-01-15 NOTE — PROGRESS NOTES
[facility]  Infectious Disease  Progress Note    Patient Name: Billy Sheffield Miguel  MRN: [id number]  Admission Date: 6/1/2018  Length of Stay: 10 days  Attending Physician: Josiah Sawyer, *  Primary Care Provider: BRAD Baker MD    Isolation Status: No active isolations  Assessment/Plan:      Right foot infection       52-year-old male admitted with right foot infection after penetrating glass trauma that failed outpatient antibiotics alone. In the ED patient was noted to have a fever, WBC of 15K, , . MRI showed diffuse subcutaneous and myofascial forefoot edema, fluid collection with associated radiopaque foreign body. No osteomyelitis. He was taken to the OR 6/2, 6/5, and 6/7  for washout with podiatry. Copious amounts of purulent drainage noted with tracking to dorsal foot. Surgical cultures grew GBS. Foreign body was found at 1st interdigital space but unable to remove. Large piece of glass removed  6/7.  Small piece of residual glass left in soft tissue and per Podiatry, it is likely so small as to be irretrievable.  Purulence and liquid necrosis was encounterd in the OR.  No surgical cultures were obtained during surgery on 6/5 or 6/7, but new bedside cultures obtained 6/9 also showing GBS. Patient underwent angiogram by interventional cardiology on 6/7 given dusky appearance of 2nd toe - good blood flow.      Patient remains IV Cefazolin. Afebrile over the last 72+  hours. No leukocytosis. Clinically stable/non septic appearing. Still with erythema to dorsal foot, but it does appear to be improving.    Plan  - Given severity of infection and close involvement of bone, would plan to treat as presumed osteomyelitis  - Recommend Cefazolin 6 g IV continuous infusion q 24 hours x 6 weeks from last  washout (end date 7/19/18)  - Patient will need ESR, CRP, CBC and CMP to be drawn weekly with results faxed to the ID Department at  [phone]  - Concerned that infection  yes will not clear without removal of retained glass fragment. Will need to monitor closely as an outpatient for any clinical worsening.   - Recommend Allergy follow up as an outpatient for PCN skin testing   - Will arrange ID follow up within a few weeks of discharge.  - ID will sign off.            Please call for any questions. Thank you.  Cielo Almazan PA-C  Phone: [phone]  Pager: [phone]    Subjective:     Principal Problem:Diabetic ulcer of right midfoot    HPI: Mr. Rivera is a 52-year-old male admitted with right foot infection. Patient states that he stepped on a piece of glass at work that penetrated through his shoe on May 19.  At that time he removed the glass, used antiseptic and antibacterial on his foot and felt that things were ok. He began having issues on May 26, when he noted that a large blister developed on his plantar surface near the first digit. No fevers, chills or sweats at home. On the 29th he was seen by his PCP and started on Cipro and Clindamycin. Patient notes that the clinda and Cipro gave him diarrhea, severe nausea and vomiting. Unfortunately his symptoms did not improve on antibiotics alone prompting him to come to the ED.     In the ED patient was noted to have a WBC of 15K, , , procalc 0.29. MRI showed diffuse subcutaneous and myofascial forefoot edema, fluid collection along the plantar aspect of the 1st and 2nd metatarsophalangeal joints with associated radiopaque foreign body likely reflecting glass.  No osteomyelitis. He was started on Dapto, Flagyl and Aztreonam given documented PCN allergy. He was taken to the OR yesterday for washout with podiatry. Copious amounts of purulent drainage noted from ulceration with tracking noted to dorsal foot at 1st interdigital space. Surgical cultures are growing GBS. He had an isolated fever post op. WBC is trending down. He is seen at bedside and feels well today. Pain controlled. Patient denies current fevers, chills, sweats,  dysuria, rash, shortness of breath, chest pain, abdominal pain. Diarrhea and nausea resolved. Of note, discussed his PCN allergy and patient states that his reaction was as a child and he does not remember the episode. His mother told him he went into a coma after receiving IM PCN. He denies being told of any anaphylactic like reactions. He is unaware if he has ever been treated with a cephalosporin.    Interval History:  No acute events overnight. Afebrile, no leukocytosis. Pain controlled. No complaints.  Repeat wound cultures of 6/8 also showing GBS. Tolerating Cefazolin. PICC placed. He is eager to be discharged.     Review of Systems   Constitutional: Negative for chills, diaphoresis and fever.   Respiratory: Negative for cough and shortness of breath.    Cardiovascular: Positive for leg swelling. Negative for chest pain.   Gastrointestinal: Negative for abdominal pain, diarrhea, nausea and vomiting.   Genitourinary: Negative for difficulty urinating, dysuria, frequency and hematuria.   Musculoskeletal: Positive for arthralgias (right foot) and joint swelling. Negative for back pain.   Skin: Positive for color change (redness RLE) and wound. Negative for rash.   Neurological: Negative for weakness and headaches.   Hematological: Negative for adenopathy.   Psychiatric/Behavioral: Negative for confusion. The patient is not nervous/anxious.    All other systems reviewed and are negative.    Objective:     Vital Signs (Most Recent):  Temp: 98 °F (36.7 °C) (06/11/18 1201)  Pulse: (!) 56 (06/11/18 1201)  Resp: 18 (06/11/18 1201)  BP: 126/77 (06/11/18 1201)  SpO2: 97 % (06/11/18 1201) Vital Signs (24h Range):  Temp:  [98 °F (36.7 °C)-98.9 °F (37.2 °C)] 98 °F (36.7 °C)  Pulse:  [56-67] 56  Resp:  [16-18] 18  SpO2:  [94 %-98 %] 97 %  BP: (110-135)/(57-77) 126/77     Weight: 122.5 kg (270 lb)  Body mass index is 35.62 kg/m².    Estimated Creatinine Clearance: 128.6 mL/min (based on SCr of 0.9 mg/dL).    Physical Exam    Constitutional: He is oriented to person, place, and time. He appears well-developed and well-nourished. No distress.   HENT:   Head: Normocephalic and atraumatic.   Eyes: Conjunctivae are normal. No scleral icterus.   Cardiovascular: Normal rate, regular rhythm and intact distal pulses.    No murmur heard.  Pulmonary/Chest: Effort normal. No respiratory distress. He has no wheezes.   Abdominal: Soft. He exhibits no distension. There is no tenderness. There is no guarding.   Musculoskeletal: He exhibits edema and tenderness (right foot).   Wound examined with Podiatry.    Right dorsal foot wound with sutures intact - open wound distal foot at base of toes. Does not probe to bone.   Serous drainage only, no gross purulence. Still with some surrounding erythema to dorsum of foot, no warmth, appears to be slowly improving. 2nd toe duskiness improved.     Right plantar foot wound - sutures intact proximally with open wound distally.  Serous drainage only. Does not probe to bone.    Neurological: He is alert and oriented to person, place, and time.   Skin: Skin is warm and dry. He is not diaphoretic. There is erythema.   Psychiatric: He has a normal mood and affect. His behavior is normal. Thought content normal.               Significant Labs:   Blood Culture:     Recent Labs  Lab 06/01/18  1445 06/02/18  2111 06/02/18  2115 06/06/18  0950   LABBLOO No growth after 5 days. No growth after 5 days. No growth after 5 days. No Growth to date  No Growth to date  No Growth to date  No Growth to date  No Growth to date     CBC:     Recent Labs  Lab 06/10/18  0500 06/11/18  0540   WBC 9.53 10.20   HGB 11.3* 12.5*   HCT 34.5* 36.8*    367*     CMP:     Recent Labs  Lab 06/10/18  0500 06/11/18  0540   * 136   K 4.4 4.4    101   CO2 27 26   * 189*   BUN 11 12   CREATININE 0.9 0.9   CALCIUM 8.9 8.8   PROT 6.8 7.0   ALBUMIN 2.2* 2.3*   BILITOT 0.4 0.4   ALKPHOS 87 88   AST 23 26   ALT 20 21   ANIONGAP  8 9   EGFRNONAA >60.0 >60.0     Wound Culture:     Recent Labs  Lab 06/02/18  1218 06/02/18  1226 06/08/18  1732   LABAERO STREPTOCOCCUS AGALACTIAE (GROUP B)ManyBeta-hemolytic streptococci are routinely susceptible to penicillins,cephalosporins and carbapenems.Susceptibility testing not routinely performed STREPTOCOCCUS AGALACTIAE (GROUP B)ManyBeta-hemolytic streptococci are routinely susceptible to penicillins,cephalosporins and carbapenems.Susceptibility testing not routinely performed STREPTOCOCCUS AGALACTIAE (GROUP B)FewBeta-hemolytic streptococci are routinely susceptible to penicillins,cephalosporins and carbapenems.       Significant Imaging: I have reviewed all pertinent imaging results/findings within the past 24 hours.

## 2025-01-16 ENCOUNTER — OFFICE VISIT (OUTPATIENT)
Dept: PODIATRY | Facility: CLINIC | Age: 60
End: 2025-01-16
Payer: COMMERCIAL

## 2025-01-16 VITALS — DIASTOLIC BLOOD PRESSURE: 90 MMHG | HEART RATE: 53 BPM | SYSTOLIC BLOOD PRESSURE: 181 MMHG

## 2025-01-16 DIAGNOSIS — M86.9 OSTEOMYELITIS, UNSPECIFIED SITE, UNSPECIFIED TYPE: ICD-10-CM

## 2025-01-16 DIAGNOSIS — L97.514 ULCER OF TOE, RIGHT, WITH NECROSIS OF BONE: Primary | ICD-10-CM

## 2025-01-16 DIAGNOSIS — M86.179 OTHER ACUTE OSTEOMYELITIS, UNSPECIFIED ANKLE AND FOOT: ICD-10-CM

## 2025-01-16 DIAGNOSIS — L08.9 DIABETIC FOOT INFECTION: ICD-10-CM

## 2025-01-16 DIAGNOSIS — E11.628 DIABETIC FOOT INFECTION: ICD-10-CM

## 2025-01-16 PROCEDURE — 3077F SYST BP >= 140 MM HG: CPT | Mod: CPTII,S$GLB,, | Performed by: PODIATRIST

## 2025-01-16 PROCEDURE — 99999 PR PBB SHADOW E&M-EST. PATIENT-LVL III: CPT | Mod: PBBFAC,,, | Performed by: PODIATRIST

## 2025-01-16 PROCEDURE — 3080F DIAST BP >= 90 MM HG: CPT | Mod: CPTII,S$GLB,, | Performed by: PODIATRIST

## 2025-01-16 PROCEDURE — 11042 DBRDMT SUBQ TIS 1ST 20SQCM/<: CPT | Mod: 79,S$GLB,, | Performed by: PODIATRIST

## 2025-01-16 PROCEDURE — 99024 POSTOP FOLLOW-UP VISIT: CPT | Mod: S$GLB,,, | Performed by: PODIATRIST

## 2025-01-21 ENCOUNTER — PATIENT MESSAGE (OUTPATIENT)
Dept: PODIATRY | Facility: CLINIC | Age: 60
End: 2025-01-21
Payer: COMMERCIAL

## 2025-01-24 ENCOUNTER — INFUSION (OUTPATIENT)
Dept: INFECTIOUS DISEASES | Facility: HOSPITAL | Age: 60
End: 2025-01-24
Payer: COMMERCIAL

## 2025-01-24 ENCOUNTER — OFFICE VISIT (OUTPATIENT)
Dept: INFECTIOUS DISEASES | Facility: CLINIC | Age: 60
End: 2025-01-24
Payer: COMMERCIAL

## 2025-01-24 ENCOUNTER — TELEPHONE (OUTPATIENT)
Dept: PODIATRY | Facility: CLINIC | Age: 60
End: 2025-01-24
Payer: COMMERCIAL

## 2025-01-24 ENCOUNTER — OFFICE VISIT (OUTPATIENT)
Dept: PODIATRY | Facility: CLINIC | Age: 60
End: 2025-01-24
Payer: COMMERCIAL

## 2025-01-24 VITALS
OXYGEN SATURATION: 94 % | RESPIRATION RATE: 19 BRPM | SYSTOLIC BLOOD PRESSURE: 161 MMHG | HEIGHT: 75 IN | TEMPERATURE: 98 F | WEIGHT: 280 LBS | HEART RATE: 72 BPM | DIASTOLIC BLOOD PRESSURE: 74 MMHG | BODY MASS INDEX: 34.82 KG/M2

## 2025-01-24 VITALS
TEMPERATURE: 98 F | BODY MASS INDEX: 36.22 KG/M2 | SYSTOLIC BLOOD PRESSURE: 189 MMHG | WEIGHT: 282.19 LBS | HEART RATE: 73 BPM | HEIGHT: 74 IN | DIASTOLIC BLOOD PRESSURE: 102 MMHG

## 2025-01-24 VITALS — BODY MASS INDEX: 35.94 KG/M2 | WEIGHT: 280 LBS | HEIGHT: 74 IN | RESPIRATION RATE: 18 BRPM

## 2025-01-24 DIAGNOSIS — I15.2 HYPERTENSION ASSOCIATED WITH DIABETES: Chronic | ICD-10-CM

## 2025-01-24 DIAGNOSIS — L97.512 ULCER OF RIGHT FOOT WITH FAT LAYER EXPOSED: Primary | ICD-10-CM

## 2025-01-24 DIAGNOSIS — B95.8 BACTERIAL INFECTION DUE TO STAPHYLOCOCCUS: ICD-10-CM

## 2025-01-24 DIAGNOSIS — D50.8 OTHER IRON DEFICIENCY ANEMIA: ICD-10-CM

## 2025-01-24 DIAGNOSIS — L97.514 ULCER OF TOE, RIGHT, WITH NECROSIS OF BONE: ICD-10-CM

## 2025-01-24 DIAGNOSIS — E11.59 HYPERTENSION ASSOCIATED WITH DIABETES: Chronic | ICD-10-CM

## 2025-01-24 DIAGNOSIS — M86.179 OTHER ACUTE OSTEOMYELITIS, UNSPECIFIED ANKLE AND FOOT: Primary | ICD-10-CM

## 2025-01-24 DIAGNOSIS — E11.49 TYPE II DIABETES MELLITUS WITH NEUROLOGICAL MANIFESTATIONS: ICD-10-CM

## 2025-01-24 LAB
ALBUMIN SERPL BCP-MCNC: 3 G/DL (ref 3.5–5.2)
ALP SERPL-CCNC: 113 U/L (ref 40–150)
ALT SERPL W/O P-5'-P-CCNC: 9 U/L (ref 10–44)
ANION GAP SERPL CALC-SCNC: 13 MMOL/L (ref 8–16)
AST SERPL-CCNC: 19 U/L (ref 10–40)
BASOPHILS # BLD AUTO: 0.05 K/UL (ref 0–0.2)
BASOPHILS NFR BLD: 0.7 % (ref 0–1.9)
BILIRUB SERPL-MCNC: 0.3 MG/DL (ref 0.1–1)
BUN SERPL-MCNC: 16 MG/DL (ref 6–20)
CALCIUM SERPL-MCNC: 8.8 MG/DL (ref 8.7–10.5)
CHLORIDE SERPL-SCNC: 100 MMOL/L (ref 95–110)
CO2 SERPL-SCNC: 23 MMOL/L (ref 23–29)
CREAT SERPL-MCNC: 0.9 MG/DL (ref 0.5–1.4)
DIFFERENTIAL METHOD BLD: ABNORMAL
EOSINOPHIL # BLD AUTO: 0.2 K/UL (ref 0–0.5)
EOSINOPHIL NFR BLD: 3.3 % (ref 0–8)
ERYTHROCYTE [DISTWIDTH] IN BLOOD BY AUTOMATED COUNT: 14.1 % (ref 11.5–14.5)
EST. GFR  (NO RACE VARIABLE): >60 ML/MIN/1.73 M^2
FERRITIN SERPL-MCNC: 211 NG/ML (ref 20–300)
GLUCOSE SERPL-MCNC: 190 MG/DL (ref 70–110)
HCT VFR BLD AUTO: 40.8 % (ref 40–54)
HGB BLD-MCNC: 13.3 G/DL (ref 14–18)
IMM GRANULOCYTES # BLD AUTO: 0.02 K/UL (ref 0–0.04)
IMM GRANULOCYTES NFR BLD AUTO: 0.3 % (ref 0–0.5)
IRON SERPL-MCNC: 52 UG/DL (ref 45–160)
LYMPHOCYTES # BLD AUTO: 1.6 K/UL (ref 1–4.8)
LYMPHOCYTES NFR BLD: 23.7 % (ref 18–48)
MCH RBC QN AUTO: 28.1 PG (ref 27–31)
MCHC RBC AUTO-ENTMCNC: 32.6 G/DL (ref 32–36)
MCV RBC AUTO: 86 FL (ref 82–98)
MONOCYTES # BLD AUTO: 0.8 K/UL (ref 0.3–1)
MONOCYTES NFR BLD: 12.3 % (ref 4–15)
NEUTROPHILS # BLD AUTO: 4 K/UL (ref 1.8–7.7)
NEUTROPHILS NFR BLD: 59.7 % (ref 38–73)
NRBC BLD-RTO: 0 /100 WBC
PLATELET # BLD AUTO: 236 K/UL (ref 150–450)
PMV BLD AUTO: 10.1 FL (ref 9.2–12.9)
POTASSIUM SERPL-SCNC: 3.8 MMOL/L (ref 3.5–5.1)
PROT SERPL-MCNC: 6.9 G/DL (ref 6–8.4)
RBC # BLD AUTO: 4.74 M/UL (ref 4.6–6.2)
SATURATED IRON: 16 % (ref 20–50)
SODIUM SERPL-SCNC: 136 MMOL/L (ref 136–145)
TOTAL IRON BINDING CAPACITY: 317 UG/DL (ref 250–450)
TRANSFERRIN SERPL-MCNC: 214 MG/DL (ref 200–375)
WBC # BLD AUTO: 6.75 K/UL (ref 3.9–12.7)

## 2025-01-24 PROCEDURE — 99211 OFF/OP EST MAY X REQ PHY/QHP: CPT

## 2025-01-24 PROCEDURE — 82668 ASSAY OF ERYTHROPOIETIN: CPT

## 2025-01-24 PROCEDURE — 83540 ASSAY OF IRON: CPT

## 2025-01-24 PROCEDURE — 3008F BODY MASS INDEX DOCD: CPT | Mod: CPTII,S$GLB,,

## 2025-01-24 PROCEDURE — 99999 PR PBB SHADOW E&M-EST. PATIENT-LVL IV: CPT | Mod: PBBFAC,,,

## 2025-01-24 PROCEDURE — 82728 ASSAY OF FERRITIN: CPT

## 2025-01-24 PROCEDURE — 85025 COMPLETE CBC W/AUTO DIFF WBC: CPT

## 2025-01-24 PROCEDURE — 80053 COMPREHEN METABOLIC PANEL: CPT

## 2025-01-24 PROCEDURE — 99213 OFFICE O/P EST LOW 20 MIN: CPT | Mod: S$GLB,,,

## 2025-01-24 PROCEDURE — 3080F DIAST BP >= 90 MM HG: CPT | Mod: CPTII,S$GLB,,

## 2025-01-24 PROCEDURE — 99999 PR PBB SHADOW E&M-EST. PATIENT-LVL III: CPT | Mod: PBBFAC,,, | Performed by: PODIATRIST

## 2025-01-24 PROCEDURE — 3077F SYST BP >= 140 MM HG: CPT | Mod: CPTII,S$GLB,,

## 2025-01-24 PROCEDURE — 36415 COLL VENOUS BLD VENIPUNCTURE: CPT
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
## 2025-01-24 NOTE — PROGRESS NOTES
Patient arrives ambulatory for PICC removal as ordered - PICC removed without difficulty from right upper medial aspect of arm - measurement marked at 40 cm with entire catheter intact without compromise noted - sterile vaseline gauze placed and large co-flex wrap with instructions to leave dry and in place x  . No signs of infection noted to site - no swelling or drainage - Will monitor on unit for 30 minutes as per protocol    Labs were drawn as ordered without difficulty    Limited head-to-toe assessment due to privacy issues and visit reason though the opportunity was given for patient to express any concerns

## 2025-01-24 NOTE — PROGRESS NOTES
Subjective:     Patient ID: Billy Rivera is a 59 y.o. male.    Chief Complaint: Wound Care (Rt foot ball of the foot ulcer and top ) and Dressing Change (Football )    Billy is a 59 y.o. male who presents to the clinic for evaluation and treatment of high risk feet. Billy has a past medical history of Allergy, CAD (coronary artery disease), native coronary artery (06/25/2013), Cancer, COVID-19 virus detected (09/01/2020), Diabetes mellitus, Diabetes mellitus, type 2, Disorder of kidney and ureter, Heart attack (04/2012), colon cancer, stage I, Hyperlipidemia, Hypertension, Muscular pain, NSTEMI May 2013 - peak troponin 0.22 (05/22/2013), MICHAELA (obstructive sleep apnea), Retinopathy due to secondary diabetes, and Uses self-applied continuous glucose monitoring device (01/03/2025). The patient's chief complaint is diabetic foot ulcer, r. This patient has documented high risk feet requiring routine maintenance secondary to diabetes mellitis and those secondary complications of diabetes, as mentioned..    PCP: Augie Ruiz MD    Date Last Seen by PCP:   Chief Complaint   Patient presents with    Wound Care     Rt foot ball of the foot ulcer and top     Dressing Change     Football      Hemoglobin A1C   Date Value Ref Range Status   11/27/2024 10.2 (H) 4.0 - 5.6 % Final     Comment:     ADA Screening Guidelines:  5.7-6.4%  Consistent with prediabetes  >or=6.5%  Consistent with diabetes    High levels of fetal hemoglobin interfere with the HbA1C  assay. Heterozygous hemoglobin variants (HbS, HgC, etc)do  not significantly interfere with this assay.   However, presence of multiple variants may affect accuracy.     07/20/2023 8.7 (H) 4.0 - 5.6 % Final     Comment:     ADA Screening Guidelines:  5.7-6.4%  Consistent with prediabetes  >or=6.5%  Consistent with diabetes    High levels of fetal hemoglobin interfere with the HbA1C  assay. Heterozygous hemoglobin variants (HbS, HgC, etc)do  not significantly  "interfere with this assay.   However, presence of multiple variants may affect accuracy.     04/06/2023 9.4 (H) 4.0 - 5.6 % Final     Comment:     ADA Screening Guidelines:  5.7-6.4%  Consistent with prediabetes  >or=6.5%  Consistent with diabetes    High levels of fetal hemoglobin interfere with the HbA1C  assay. Heterozygous hemoglobin variants (HbS, HgC, etc)do  not significantly interfere with this assay.   However, presence of multiple variants may affect accuracy.         Review of Systems   Constitutional: Negative for chills, decreased appetite and fever.   Cardiovascular:  Negative for leg swelling.   Skin:  Positive for dry skin, nail changes and poor wound healing.   Musculoskeletal:  Negative for arthritis, joint pain, joint swelling and myalgias.   Gastrointestinal:  Negative for nausea and vomiting.   Neurological:  Positive for numbness, paresthesias and sensory change. Negative for loss of balance.        Objective:     Vitals:    01/24/25 1318   Resp: 18   Weight: 127 kg (279 lb 15.8 oz)   Height: 6' 2" (1.88 m)   PainSc: 0-No pain        Physical Exam  Constitutional:       Appearance: He is well-developed.   Cardiovascular:      Comments: Dorsalis pedis and posterior tibial pulses are diminished Bilaterally. Toes are cool to touch. Feet are warm proximally.There is decreased digital hair. Skin is atrophic, slightly hyperpigmented, and mildly edematous      Musculoskeletal:         General: Swelling and deformity present. No tenderness. Normal range of motion.      Comments: Adequate joint range of motion without pain, limitation, nor crepitation Bilateral feet and ankle joints. Muscle strength is 5/5 in all groups bilaterally.         Skin:     General: Skin is warm and dry.      Findings: No ecchymosis, erythema or lesion.   Neurological:      Mental Status: He is alert and oriented to person, place, and time.      Comments: [place] 5.07 monofilamant testing is diminished Vinicio feet. " Sharp/dull sensation diminished Bilaterally. Light touch absent Bilaterally.       Psychiatric:         Behavior: Behavior normal.       1.24.25    1.5x0.6x10.2 Similar lesion with a granular fibrotic base of the dorsal aspect of the right 2nd MPJ.    Assessment:      Encounter Diagnoses   Name Primary?    Other acute osteomyelitis, unspecified ankle and foot Yes    Ulcer of toe, right, with necrosis of bone     Type II diabetes mellitus with neurological manifestations      Plan:     Billy was seen today for wound care and dressing change.    Diagnoses and all orders for this visit:    Other acute osteomyelitis, unspecified ankle and foot    Ulcer of toe, right, with necrosis of bone    Type II diabetes mellitus with neurological manifestations      I counseled the patient on his conditions, their implications and medical management.    Independent review of patients previous clinical notes  Reviewed current medication(s), and lab(s) specific to foot ailment(s) with patient in detail. All questions answered   Patient is status post 2nd met head resection.  Seen by Infectious Disease this morning.  Healing well.  Okay to discontinue antibiotics as per Infectious Disease.  We will continue local wound care until wound has resolved.  Debridement: With verbal consent, nonviable tissues on the right foot were debrided beyond sub q utilizing a  sterile No. 3 scalpel and forceps. Minimal bleeding controlled with direct pressure  The patient tolerated this well.     Dressings: Ag foam   Offloading:Foam    Follow-up:Patient is to return to the clinic in 1 week  for follow-up but should call [facility] immediately if any signs of infection, such as fever, chills, sweats, increased redness or pain.    Short-term goals include maintaining good offloading and minimizing bioburden, promoting granulation and epithelialization to healing.  Long-term goals include keeping the wound healed by good offloading and medical management  under the direction of internist.      .

## 2025-01-27 ENCOUNTER — PATIENT MESSAGE (OUTPATIENT)
Dept: INFECTIOUS DISEASES | Facility: CLINIC | Age: 60
End: 2025-01-27
Payer: COMMERCIAL

## 2025-01-27 LAB — EPO SERPL-ACNC: 19.8 MIU/ML (ref 2.6–18.5)

## 2025-01-29 ENCOUNTER — OFFICE VISIT (OUTPATIENT)
Dept: PODIATRY | Facility: CLINIC | Age: 60
End: 2025-01-29
Payer: COMMERCIAL

## 2025-01-29 VITALS
WEIGHT: 280 LBS | HEART RATE: 78 BPM | HEIGHT: 74 IN | SYSTOLIC BLOOD PRESSURE: 171 MMHG | BODY MASS INDEX: 35.94 KG/M2 | DIASTOLIC BLOOD PRESSURE: 89 MMHG

## 2025-01-29 DIAGNOSIS — E11.49 TYPE II DIABETES MELLITUS WITH NEUROLOGICAL MANIFESTATIONS: Primary | ICD-10-CM

## 2025-01-29 DIAGNOSIS — L97.514 ULCER OF TOE, RIGHT, WITH NECROSIS OF BONE: ICD-10-CM

## 2025-01-29 PROCEDURE — 99999 PR PBB SHADOW E&M-EST. PATIENT-LVL IV: CPT | Mod: PBBFAC,,, | Performed by: PODIATRIST

## 2025-01-29 NOTE — PROGRESS NOTES
Subjective:     Patient ID: Billy Rivera is a 59 y.o. male.    Chief Complaint: Wound Care (Right foot)    Billy is a 59 y.o. male who presents to the clinic for evaluation and treatment of high risk feet. Billy has a past medical history of Allergy, CAD (coronary artery disease), native coronary artery (06/25/2013), Cancer, COVID-19 virus detected (09/01/2020), Diabetes mellitus, Diabetes mellitus, type 2, Disorder of kidney and ureter, Heart attack (04/2012), colon cancer, stage I, Hyperlipidemia, Hypertension, Muscular pain, NSTEMI May 2013 - peak troponin 0.22 (05/22/2013), MICHAELA (obstructive sleep apnea), Retinopathy due to secondary diabetes, and Uses self-applied continuous glucose monitoring device (01/03/2025). The patient's chief complaint is diabetic foot ulcer, r. This patient has documented high risk feet requiring routine maintenance secondary to diabetes mellitis and those secondary complications of diabetes, as mentioned..    PCP: Augie Ruiz MD    Date Last Seen by PCP:   Chief Complaint   Patient presents with    Wound Care     Right foot     Hemoglobin A1C   Date Value Ref Range Status   11/27/2024 10.2 (H) 4.0 - 5.6 % Final     Comment:     ADA Screening Guidelines:  5.7-6.4%  Consistent with prediabetes  >or=6.5%  Consistent with diabetes    High levels of fetal hemoglobin interfere with the HbA1C  assay. Heterozygous hemoglobin variants (HbS, HgC, etc)do  not significantly interfere with this assay.   However, presence of multiple variants may affect accuracy.     07/20/2023 8.7 (H) 4.0 - 5.6 % Final     Comment:     ADA Screening Guidelines:  5.7-6.4%  Consistent with prediabetes  >or=6.5%  Consistent with diabetes    High levels of fetal hemoglobin interfere with the HbA1C  assay. Heterozygous hemoglobin variants (HbS, HgC, etc)do  not significantly interfere with this assay.   However, presence of multiple variants may affect accuracy.     04/06/2023 9.4 (H) 4.0 - 5.6 %  "Final     Comment:     ADA Screening Guidelines:  5.7-6.4%  Consistent with prediabetes  >or=6.5%  Consistent with diabetes    High levels of fetal hemoglobin interfere with the HbA1C  assay. Heterozygous hemoglobin variants (HbS, HgC, etc)do  not significantly interfere with this assay.   However, presence of multiple variants may affect accuracy.         Review of Systems   Constitutional: Negative for chills, decreased appetite and fever.   Cardiovascular:  Negative for leg swelling.   Skin:  Positive for dry skin, nail changes and poor wound healing.   Musculoskeletal:  Negative for arthritis, joint pain, joint swelling and myalgias.   Gastrointestinal:  Negative for nausea and vomiting.   Neurological:  Positive for numbness, paresthesias and sensory change. Negative for loss of balance.        Objective:     Vitals:    01/29/25 0820   BP: (!) 171/89   Pulse: 78   Weight: 127 kg (279 lb 15.8 oz)   Height: 6' 2" (1.88 m)   PainSc: 0-No pain        Physical Exam  Constitutional:       Appearance: He is well-developed.   Cardiovascular:      Comments: Dorsalis pedis and posterior tibial pulses are diminished Bilaterally. Toes are cool to touch. Feet are warm proximally.There is decreased digital hair. Skin is atrophic, slightly hyperpigmented, and mildly edematous      Musculoskeletal:         General: Swelling and deformity present. No tenderness. Normal range of motion.      Comments: Adequate joint range of motion without pain, limitation, nor crepitation Bilateral feet and ankle joints. Muscle strength is 5/5 in all groups bilaterally.         Skin:     General: Skin is warm and dry.      Findings: No ecchymosis, erythema or lesion.   Neurological:      Mental Status: He is alert and oriented to person, place, and time.      Comments: Dickinson-Roge 5.07 monofilamant testing is diminished Vinicio feet. Sharp/dull sensation diminished Bilaterally. Light touch absent Bilaterally.       Psychiatric:         " "Behavior: Behavior normal.         1.24.25    1.0 x 0.5 x 0.2 Similar lesion with a granular fibrotic base of the dorsal aspect of the right 2nd MPJ.    Assessment:      Encounter Diagnoses   Name Primary?    Type II diabetes mellitus with neurological manifestations Yes    Ulcer of toe, right, with necrosis of bone      Plan:     Billy was seen today for wound care.    Diagnoses and all orders for this visit:    Type II diabetes mellitus with neurological manifestations    Ulcer of toe, right, with necrosis of bone      I counseled the patient on his conditions, their implications and medical management.    Independent review of patients previous clinical notes  Reviewed current medication(s), and lab(s) specific to foot ailment(s) with patient in detail. All questions answered   Patient is status post 2nd met head resection.      Wound Debridement    Performed by: Mingo Melara DPM   Authorized by: Patient    Time out: Immediately prior to procedure a "time out" was called to verify the correct patient, procedure, equipment, support staff and site/side marked as required.   Consent Done?:  Yes (Verbal)  Local anesthesia used?: No       Wound Details:    Location:  Right foot     Type of Debridement:  Excisional       Length (cm):  1.0       Width (cm):  0.5       Depth (cm):  0.2       Percent Debrided (%):  100             Depth of debridement:  Subcutaneous tissue    Tissue debrided:  Dermis, Epidermis and Subcutaneous    Devitalized tissue debrided:  Biofilm, Callus and Necrotic/Eschar    Instruments:  Blade, Curette and Nippers     Bleeding:  Minimal  Hemostasis Achieved: Yes    Method Used:  Pressure  Patient tolerance:  Patient tolerated the procedure well with no immediate complications    Follow-up:Patient is to return to the clinic in 1 week  for follow-up but should [facility] immediately if any signs of infection, such as fever, chills, sweats, increased redness or pain.    Short-term goals " include maintaining good offloading and minimizing bioburden, promoting granulation and epithelialization to healing.  Long-term goals include keeping the wound healed by good offloading and medical management under the direction of internist.    Follow-up 1 week.

## 2025-01-31 ENCOUNTER — TELEPHONE (OUTPATIENT)
Dept: INTERNAL MEDICINE | Facility: CLINIC | Age: 60
End: 2025-01-31
Payer: COMMERCIAL

## 2025-01-31 NOTE — TELEPHONE ENCOUNTER
Patient informed of provider message. Patient labs scheduled, patient voiced understanding of appointment time and date.  ----- Message from Augie Ruiz MD sent at 1/7/2025  8:18 AM CST -----  Please facilitate one-week repeat CBC to ensure stability/improvement of anemia.

## 2025-01-31 NOTE — PROGRESS NOTES
"  Patient presents status post metatarsal head resection right foot.  Patient had a postoperative infection, with dehiscence of the dorsal wound status post metatarsal head resection right foot.  The ulceration to the plantar aspect of the foot is healed however there is a 1 cm x 0.5 cm wide 0.2 cm full-thickness ulceration dorsally.  Wound is granular.  These are post debridement measurements.  Otherwise no complaints.  Follow-up in 1 week.    Wound Debridement    Performed by: Mingo Melara DPM   Authorized by: Patient    Time out: Immediately prior to procedure a "time out" was called to verify the correct patient, procedure, equipment, support staff and site/side marked as required.   Consent Done?:  Yes (Verbal)  Local anesthesia used?: No       Wound Details:    Location:  Right foot     Type of Debridement:  Excisional       Length (cm):  1 cm       Width (cm):  0.5 cm       Depth (cm):  0.2 cm       Percent Debrided (%):  100             Depth of debridement:  Subcutaneous tissue    Tissue debrided:  Dermis, Epidermis and Subcutaneous    Devitalized tissue debrided:  Biofilm, Callus and Necrotic/Eschar    Instruments:  Blade, Curette and Nippers     Bleeding:  Minimal  Hemostasis Achieved: Yes    Method Used:  Pressure  Patient tolerance:  Patient tolerated the procedure well with no immediate complications      "

## 2025-02-05 ENCOUNTER — LAB VISIT (OUTPATIENT)
Dept: LAB | Facility: HOSPITAL | Age: 60
End: 2025-02-05
Attending: STUDENT IN AN ORGANIZED HEALTH CARE EDUCATION/TRAINING PROGRAM
Payer: COMMERCIAL

## 2025-02-05 ENCOUNTER — OFFICE VISIT (OUTPATIENT)
Dept: PODIATRY | Facility: CLINIC | Age: 60
End: 2025-02-05
Payer: COMMERCIAL

## 2025-02-05 VITALS
DIASTOLIC BLOOD PRESSURE: 88 MMHG | BODY MASS INDEX: 35.65 KG/M2 | HEART RATE: 75 BPM | SYSTOLIC BLOOD PRESSURE: 165 MMHG | HEIGHT: 74 IN | WEIGHT: 277.75 LBS

## 2025-02-05 DIAGNOSIS — L97.514 ULCER OF TOE, RIGHT, WITH NECROSIS OF BONE: ICD-10-CM

## 2025-02-05 DIAGNOSIS — E11.49 TYPE II DIABETES MELLITUS WITH NEUROLOGICAL MANIFESTATIONS: Primary | ICD-10-CM

## 2025-02-05 DIAGNOSIS — D64.9 ANEMIA, UNSPECIFIED TYPE: ICD-10-CM

## 2025-02-05 DIAGNOSIS — L97.522 FOOT ULCER, LEFT, WITH FAT LAYER EXPOSED: ICD-10-CM

## 2025-02-05 LAB
BASOPHILS # BLD AUTO: 0.05 K/UL (ref 0–0.2)
BASOPHILS NFR BLD: 0.7 % (ref 0–1.9)
DIFFERENTIAL METHOD BLD: ABNORMAL
EOSINOPHIL # BLD AUTO: 0.2 K/UL (ref 0–0.5)
EOSINOPHIL NFR BLD: 2.8 % (ref 0–8)
ERYTHROCYTE [DISTWIDTH] IN BLOOD BY AUTOMATED COUNT: 14.6 % (ref 11.5–14.5)
FERRITIN SERPL-MCNC: 242 NG/ML (ref 20–300)
HCT VFR BLD AUTO: 43.1 % (ref 40–54)
HGB BLD-MCNC: 14.4 G/DL (ref 14–18)
IMM GRANULOCYTES # BLD AUTO: 0.02 K/UL (ref 0–0.04)
IMM GRANULOCYTES NFR BLD AUTO: 0.3 % (ref 0–0.5)
IRON SERPL-MCNC: 72 UG/DL (ref 45–160)
LYMPHOCYTES # BLD AUTO: 1.7 K/UL (ref 1–4.8)
LYMPHOCYTES NFR BLD: 24.4 % (ref 18–48)
MCH RBC QN AUTO: 28.5 PG (ref 27–31)
MCHC RBC AUTO-ENTMCNC: 33.4 G/DL (ref 32–36)
MCV RBC AUTO: 85 FL (ref 82–98)
MONOCYTES # BLD AUTO: 0.7 K/UL (ref 0.3–1)
MONOCYTES NFR BLD: 9.6 % (ref 4–15)
NEUTROPHILS # BLD AUTO: 4.2 K/UL (ref 1.8–7.7)
NEUTROPHILS NFR BLD: 62.2 % (ref 38–73)
NRBC BLD-RTO: 0 /100 WBC
PLATELET # BLD AUTO: 231 K/UL (ref 150–450)
PMV BLD AUTO: 10.7 FL (ref 9.2–12.9)
RBC # BLD AUTO: 5.05 M/UL (ref 4.6–6.2)
SATURATED IRON: 21 % (ref 20–50)
TOTAL IRON BINDING CAPACITY: 349 UG/DL (ref 250–450)
TRANSFERRIN SERPL-MCNC: 236 MG/DL (ref 200–375)
WBC # BLD AUTO: 6.75 K/UL (ref 3.9–12.7)

## 2025-02-05 PROCEDURE — 3077F SYST BP >= 140 MM HG: CPT | Mod: CPTII,S$GLB,, | Performed by: PODIATRIST

## 2025-02-05 PROCEDURE — 3079F DIAST BP 80-89 MM HG: CPT | Mod: CPTII,S$GLB,, | Performed by: PODIATRIST

## 2025-02-05 PROCEDURE — 82728 ASSAY OF FERRITIN: CPT | Performed by: STUDENT IN AN ORGANIZED HEALTH CARE EDUCATION/TRAINING PROGRAM

## 2025-02-05 PROCEDURE — 99999 PR PBB SHADOW E&M-EST. PATIENT-LVL IV: CPT | Mod: PBBFAC,,, | Performed by: PODIATRIST

## 2025-02-05 PROCEDURE — 99024 POSTOP FOLLOW-UP VISIT: CPT | Mod: S$GLB,,, | Performed by: PODIATRIST

## 2025-02-05 PROCEDURE — 85025 COMPLETE CBC W/AUTO DIFF WBC: CPT | Performed by: STUDENT IN AN ORGANIZED HEALTH CARE EDUCATION/TRAINING PROGRAM

## 2025-02-05 PROCEDURE — 3008F BODY MASS INDEX DOCD: CPT | Mod: CPTII,S$GLB,, | Performed by: PODIATRIST

## 2025-02-05 PROCEDURE — 83540 ASSAY OF IRON: CPT | Performed by: STUDENT IN AN ORGANIZED HEALTH CARE EDUCATION/TRAINING PROGRAM

## 2025-02-05 PROCEDURE — 82668 ASSAY OF ERYTHROPOIETIN: CPT | Performed by: STUDENT IN AN ORGANIZED HEALTH CARE EDUCATION/TRAINING PROGRAM

## 2025-02-07 ENCOUNTER — PATIENT OUTREACH (OUTPATIENT)
Dept: ADMINISTRATIVE | Facility: HOSPITAL | Age: 60
End: 2025-02-07
Payer: COMMERCIAL

## 2025-02-07 ENCOUNTER — EXTERNAL HOME HEALTH (OUTPATIENT)
Dept: HOME HEALTH SERVICES | Facility: HOSPITAL | Age: 60
End: 2025-02-07
Payer: COMMERCIAL

## 2025-02-07 LAB — EPO SERPL-ACNC: 12.5 MIU/ML (ref 2.6–18.5)

## 2025-02-11 ENCOUNTER — OFFICE VISIT (OUTPATIENT)
Dept: PODIATRY | Facility: CLINIC | Age: 60
End: 2025-02-11
Payer: COMMERCIAL

## 2025-02-11 VITALS
HEIGHT: 74 IN | DIASTOLIC BLOOD PRESSURE: 84 MMHG | BODY MASS INDEX: 35.65 KG/M2 | WEIGHT: 277.75 LBS | SYSTOLIC BLOOD PRESSURE: 158 MMHG | HEART RATE: 62 BPM

## 2025-02-11 DIAGNOSIS — E11.49 TYPE II DIABETES MELLITUS WITH NEUROLOGICAL MANIFESTATIONS: Primary | ICD-10-CM

## 2025-02-11 DIAGNOSIS — L97.514 ULCER OF TOE, RIGHT, WITH NECROSIS OF BONE: ICD-10-CM

## 2025-02-11 PROCEDURE — 99999 PR PBB SHADOW E&M-EST. PATIENT-LVL IV: CPT | Mod: PBBFAC,,, | Performed by: PODIATRIST

## 2025-02-11 NOTE — PROGRESS NOTES
Subjective:     Patient ID: Billy Rivera is a 59 y.o. male.    Chief Complaint: Wound Check (Bilateral )    Billy is a 59 y.o. male who presents to the clinic for evaluation and treatment of high risk feet. Billy has a past medical history of Allergy, CAD (coronary artery disease), native coronary artery (06/25/2013), Cancer, COVID-19 virus detected (09/01/2020), Diabetes mellitus, Diabetes mellitus, type 2, Disorder of kidney and ureter, Heart attack (04/2012), colon cancer, stage I, Hyperlipidemia, Hypertension, Muscular pain, NSTEMI May 2013 - peak troponin 0.22 (05/22/2013), MICHAELA (obstructive sleep apnea), Retinopathy due to secondary diabetes, and Uses self-applied continuous glucose monitoring device (01/03/2025). The patient's chief complaint is diabetic foot ulcer, r. This patient has documented high risk feet requiring routine maintenance secondary to diabetes mellitis and those secondary complications of diabetes, as mentioned..  Improving/no new complaints today.    PCP: Augie Ruiz MD    Date Last Seen by PCP:   Chief Complaint   Patient presents with    Wound Check     Bilateral      Hemoglobin A1C   Date Value Ref Range Status   11/27/2024 10.2 (H) 4.0 - 5.6 % Final     Comment:     ADA Screening Guidelines:  5.7-6.4%  Consistent with prediabetes  >or=6.5%  Consistent with diabetes    High levels of fetal hemoglobin interfere with the HbA1C  assay. Heterozygous hemoglobin variants (HbS, HgC, etc)do  not significantly interfere with this assay.   However, presence of multiple variants may affect accuracy.     07/20/2023 8.7 (H) 4.0 - 5.6 % Final     Comment:     ADA Screening Guidelines:  5.7-6.4%  Consistent with prediabetes  >or=6.5%  Consistent with diabetes    High levels of fetal hemoglobin interfere with the HbA1C  assay. Heterozygous hemoglobin variants (HbS, HgC, etc)do  not significantly interfere with this assay.   However, presence of multiple variants may affect  "accuracy.     04/06/2023 9.4 (H) 4.0 - 5.6 % Final     Comment:     ADA Screening Guidelines:  5.7-6.4%  Consistent with prediabetes  >or=6.5%  Consistent with diabetes    High levels of fetal hemoglobin interfere with the HbA1C  assay. Heterozygous hemoglobin variants (HbS, HgC, etc)do  not significantly interfere with this assay.   However, presence of multiple variants may affect accuracy.         Review of Systems   Constitutional: Negative for chills, decreased appetite and fever.   Cardiovascular:  Negative for leg swelling.   Skin:  Positive for dry skin, nail changes and poor wound healing.   Musculoskeletal:  Negative for arthritis, joint pain, joint swelling and myalgias.   Gastrointestinal:  Negative for nausea and vomiting.   Neurological:  Positive for numbness, paresthesias and sensory change. Negative for loss of balance.        Objective:     Vitals:    02/11/25 0815   BP: (!) 158/84   Pulse: 62   Weight: 126 kg (277 lb 12.5 oz)   Height: 6' 2" (1.88 m)   PainSc: 0-No pain        Physical Exam  Constitutional:       Appearance: He is well-developed.   Cardiovascular:      Comments: Dorsalis pedis and posterior tibial pulses are diminished Bilaterally. Toes are cool to touch. Feet are warm proximally.There is decreased digital hair. Skin is atrophic, slightly hyperpigmented, and mildly edematous      Musculoskeletal:         General: Swelling and deformity present. No tenderness. Normal range of motion.      Comments: Adequate joint range of motion without pain, limitation, nor crepitation Bilateral feet and ankle joints. Muscle strength is 5/5 in all groups bilaterally.         Skin:     General: Skin is warm and dry.      Findings: No ecchymosis, erythema or lesion.   Neurological:      Mental Status: He is alert and oriented to person, place, and time.      Comments: [place]-Roge 5.07 monofilamant testing is diminished Vinicio feet. Sharp/dull sensation diminished Bilaterally. Light touch absent " "Bilaterally.       Psychiatric:         Behavior: Behavior normal.         1.24.25      Wound Details:    Location:  Right foot          Length (cm):  0.5       Width (cm):  0.2       Depth (cm):  0.1      Assessment:      Encounter Diagnoses   Name Primary?    Type II diabetes mellitus with neurological manifestations Yes    Ulcer of toe, right, with necrosis of bone      Plan:     Billy was seen today for wound check.    Diagnoses and all orders for this visit:    Type II diabetes mellitus with neurological manifestations    Ulcer of toe, right, with necrosis of bone      I counseled the patient on his conditions, their implications and medical management.    Independent review of patients previous clinical notes  Reviewed current medication(s), and lab(s) specific to foot ailment(s) with patient in detail. All questions answered   Patient is status post 2nd met head resection.      Wound Debridement    Performed by: Mingo Melara DPM   Authorized by: Patient    Time out: Immediately prior to procedure a "time out" was called to verify the correct patient, procedure, equipment, support staff and site/side marked as required.   Consent Done?:  Yes (Verbal)  Local anesthesia used?: No       Wound Details:    Location:  Right foot     Type of Debridement:  Excisional       Length (cm):  0.5       Width (cm):  0.2       Depth (cm):  0.1       Percent Debrided (%):  100             Depth of debridement:  Subcutaneous tissue    Tissue debrided:  Dermis, Epidermis and Subcutaneous    Devitalized tissue debrided:  Biofilm, Callus and Necrotic/Eschar    Instruments:  Blade, Curette and Nippers     Bleeding:  Minimal  Hemostasis Achieved: Yes    Method Used:  Pressure  Patient tolerance:  Patient tolerated the procedure well with no immediate complications    Follow-up:Patient is to return to the clinic in 1 week  for follow-up but should call [facility] immediately if any signs of infection, such as fever, chills, " sweats, increased redness or pain.    Short-term goals include maintaining good offloading and minimizing bioburden, promoting granulation and epithelialization to healing.  Long-term goals include keeping the wound healed by good offloading and medical management under the direction of internist.    Follow-up 1 week.

## 2025-02-15 NOTE — PROGRESS NOTES
Subjective:     Patient ID: Billy Rivera is a 59 y.o. male.    Chief Complaint: Wound Care (Bilateral )    Billy is a 59 y.o. male who presents to the clinic for evaluation and treatment of high risk feet. Billy has a past medical history of Allergy, CAD (coronary artery disease), native coronary artery (06/25/2013), Cancer, COVID-19 virus detected (09/01/2020), Diabetes mellitus, Diabetes mellitus, type 2, Disorder of kidney and ureter, Heart attack (04/2012), colon cancer, stage I, Hyperlipidemia, Hypertension, Muscular pain, NSTEMI May 2013 - peak troponin 0.22 (05/22/2013), MICHAELA (obstructive sleep apnea), Retinopathy due to secondary diabetes, and Uses self-applied continuous glucose monitoring device (01/03/2025). The patient's chief complaint is diabetic foot ulcer, r. This patient has documented high risk feet requiring routine maintenance secondary to diabetes mellitis and those secondary complications of diabetes, as mentioned..  Improving/plantar ulcer as healed after surgery however small incisional dehiscence still remains.  PCP: Augie Ruiz MD    Date Last Seen by PCP:   Chief Complaint   Patient presents with    Wound Care     Bilateral      Hemoglobin A1C   Date Value Ref Range Status   11/27/2024 10.2 (H) 4.0 - 5.6 % Final     Comment:     ADA Screening Guidelines:  5.7-6.4%  Consistent with prediabetes  >or=6.5%  Consistent with diabetes    High levels of fetal hemoglobin interfere with the HbA1C  assay. Heterozygous hemoglobin variants (HbS, HgC, etc)do  not significantly interfere with this assay.   However, presence of multiple variants may affect accuracy.     07/20/2023 8.7 (H) 4.0 - 5.6 % Final     Comment:     ADA Screening Guidelines:  5.7-6.4%  Consistent with prediabetes  >or=6.5%  Consistent with diabetes    High levels of fetal hemoglobin interfere with the HbA1C  assay. Heterozygous hemoglobin variants (HbS, HgC, etc)do  not significantly interfere with this assay.  "  However, presence of multiple variants may affect accuracy.     04/06/2023 9.4 (H) 4.0 - 5.6 % Final     Comment:     ADA Screening Guidelines:  5.7-6.4%  Consistent with prediabetes  >or=6.5%  Consistent with diabetes    High levels of fetal hemoglobin interfere with the HbA1C  assay. Heterozygous hemoglobin variants (HbS, HgC, etc)do  not significantly interfere with this assay.   However, presence of multiple variants may affect accuracy.         Review of Systems   Constitutional: Negative for chills, decreased appetite and fever.   Cardiovascular:  Negative for leg swelling.   Skin:  Positive for dry skin, nail changes and poor wound healing.   Musculoskeletal:  Negative for arthritis, joint pain, joint swelling and myalgias.   Gastrointestinal:  Negative for nausea and vomiting.   Neurological:  Positive for numbness, paresthesias and sensory change. Negative for loss of balance.        Objective:     Vitals:    02/05/25 1624   BP: (!) 165/88   Pulse: 75   Weight: 126 kg (277 lb 12.5 oz)   Height: 6' 2" (1.88 m)   PainSc: 0-No pain        Physical Exam  Constitutional:       Appearance: He is well-developed.   Cardiovascular:      Comments: Dorsalis pedis and posterior tibial pulses are diminished Bilaterally. Toes are cool to touch. Feet are warm proximally.There is decreased digital hair. Skin is atrophic, slightly hyperpigmented, and mildly edematous      Musculoskeletal:         General: Swelling and deformity present. No tenderness. Normal range of motion.      Comments: Adequate joint range of motion without pain, limitation, nor crepitation Bilateral feet and ankle joints. Muscle strength is 5/5 in all groups bilaterally.         Skin:     General: Skin is warm and dry.      Findings: No ecchymosis, erythema or lesion.   Neurological:      Mental Status: He is alert and oriented to person, place, and time.      Comments: [place]-Roge 5.07 monofilamant testing is diminished Vinicio feet. Sharp/dull " "sensation diminished Bilaterally. Light touch absent Bilaterally.       Psychiatric:         Behavior: Behavior normal.         1.24.25    0.5 x 0.5 x 0.2 Similar lesion with a granular fibrotic base of the dorsal aspect of the right 2nd MPJ.    Assessment:      Encounter Diagnoses   Name Primary?    Type II diabetes mellitus with neurological manifestations Yes    Ulcer of toe, right, with necrosis of bone     Foot ulcer, left, with fat layer exposed      Plan:     Billy was seen today for wound care.    Diagnoses and all orders for this visit:    Type II diabetes mellitus with neurological manifestations    Ulcer of toe, right, with necrosis of bone    Foot ulcer, left, with fat layer exposed      I counseled the patient on his conditions, their implications and medical management.    Independent review of patients previous clinical notes  Reviewed current medication(s), and lab(s) specific to foot ailment(s) with patient in detail. All questions answered   Patient is status post 2nd met head resection.      Wound Debridement    Performed by: Mingo Melara DPM   Authorized by: Patient    Time out: Immediately prior to procedure a "time out" was called to verify the correct patient, procedure, equipment, support staff and site/side marked as required.   Consent Done?:  Yes (Verbal)  Local anesthesia used?: No       Wound Details:    Location:  Right foot     Type of Debridement:  Excisional       Length (cm): 0.5       Width (cm):  0.5       Depth (cm):  0.2       Percent Debrided (%):  100             Depth of debridement:  Subcutaneous tissue    Tissue debrided:  Dermis, Epidermis and Subcutaneous    Devitalized tissue debrided:  Biofilm, Callus and Necrotic/Eschar    Instruments:  Blade, Curette and Nippers     Bleeding:  Minimal  Hemostasis Achieved: Yes    Method Used:  Pressure  Patient tolerance:  Patient tolerated the procedure well with no immediate complications    Follow-up:Patient is to return to " the clinic in 1 week  for follow-up but should call [facility] immediately if any signs of infection, such as fever, chills, sweats, increased redness or pain.    Short-term goals include maintaining good offloading and minimizing bioburden, promoting granulation and epithelialization to healing.  Long-term goals include keeping the wound healed by good offloading and medical management under the direction of internist.    Follow-up 1 week.

## 2025-02-18 ENCOUNTER — PATIENT MESSAGE (OUTPATIENT)
Dept: PODIATRY | Facility: CLINIC | Age: 60
End: 2025-02-18

## 2025-02-18 ENCOUNTER — OFFICE VISIT (OUTPATIENT)
Dept: PODIATRY | Facility: CLINIC | Age: 60
End: 2025-02-18
Payer: COMMERCIAL

## 2025-02-18 VITALS
HEIGHT: 74 IN | DIASTOLIC BLOOD PRESSURE: 76 MMHG | BODY MASS INDEX: 35.66 KG/M2 | HEART RATE: 52 BPM | SYSTOLIC BLOOD PRESSURE: 137 MMHG

## 2025-02-18 DIAGNOSIS — L97.522 FOOT ULCER, LEFT, WITH FAT LAYER EXPOSED: ICD-10-CM

## 2025-02-18 DIAGNOSIS — L97.514 ULCER OF TOE, RIGHT, WITH NECROSIS OF BONE: ICD-10-CM

## 2025-02-18 DIAGNOSIS — L84 CORN OR CALLUS: ICD-10-CM

## 2025-02-18 DIAGNOSIS — Z89.412 LEFT GREAT TOE AMPUTEE: ICD-10-CM

## 2025-02-18 DIAGNOSIS — E11.49 TYPE II DIABETES MELLITUS WITH NEUROLOGICAL MANIFESTATIONS: Primary | ICD-10-CM

## 2025-02-18 NOTE — PROGRESS NOTES
Subjective:     Patient ID: Billy Rivera is a 59 y.o. male.    Chief Complaint: Wound Care (B/l foot) and Diabetes Mellitus (Pcp Augie Ruiz MD 01/02/2025/)    Billy is a 59 y.o. male who presents to the clinic for evaluation and treatment of high risk feet. Billy has a past medical history of Allergy, CAD (coronary artery disease), native coronary artery (06/25/2013), Cancer, COVID-19 virus detected (09/01/2020), Diabetes mellitus, Diabetes mellitus, type 2, Disorder of kidney and ureter, Heart attack (04/2012), colon cancer, stage I, Hyperlipidemia, Hypertension, Muscular pain, NSTEMI May 2013 - peak troponin 0.22 (05/22/2013), MICHAELA (obstructive sleep apnea), Retinopathy due to secondary diabetes, and Uses self-applied continuous glucose monitoring device (01/03/2025). The patient's chief complaint is diabetic foot ulcer, r. This patient has documented high risk feet requiring routine maintenance secondary to diabetes mellitis and those secondary complications of diabetes, as mentioned..  Improving/no new complaints today.  Has been in a football dressing bilateral with Darco shoes.    PCP: Augie Ruiz MD    Date Last Seen by PCP:   Chief Complaint   Patient presents with    Wound Care     B/l foot    Diabetes Mellitus     Pcp Augie Ruiz MD 01/02/2025       Hemoglobin A1C   Date Value Ref Range Status   11/27/2024 10.2 (H) 4.0 - 5.6 % Final     Comment:     ADA Screening Guidelines:  5.7-6.4%  Consistent with prediabetes  >or=6.5%  Consistent with diabetes    High levels of fetal hemoglobin interfere with the HbA1C  assay. Heterozygous hemoglobin variants (HbS, HgC, etc)do  not significantly interfere with this assay.   However, presence of multiple variants may affect accuracy.     07/20/2023 8.7 (H) 4.0 - 5.6 % Final     Comment:     ADA Screening Guidelines:  5.7-6.4%  Consistent with prediabetes  >or=6.5%  Consistent with diabetes    High levels of fetal hemoglobin interfere  "with the HbA1C  assay. Heterozygous hemoglobin variants (HbS, HgC, etc)do  not significantly interfere with this assay.   However, presence of multiple variants may affect accuracy.     04/06/2023 9.4 (H) 4.0 - 5.6 % Final     Comment:     ADA Screening Guidelines:  5.7-6.4%  Consistent with prediabetes  >or=6.5%  Consistent with diabetes    High levels of fetal hemoglobin interfere with the HbA1C  assay. Heterozygous hemoglobin variants (HbS, HgC, etc)do  not significantly interfere with this assay.   However, presence of multiple variants may affect accuracy.         Review of Systems   Constitutional: Negative for chills, decreased appetite and fever.   Cardiovascular:  Negative for leg swelling.   Skin:  Positive for dry skin, nail changes and poor wound healing.   Musculoskeletal:  Negative for arthritis, joint pain, joint swelling and myalgias.   Gastrointestinal:  Negative for nausea and vomiting.   Neurological:  Positive for numbness, paresthesias and sensory change. Negative for loss of balance.        Objective:     Vitals:    02/18/25 0852   BP: 137/76   Pulse: (!) 52   Height: 6' 2" (1.88 m)   PainSc: 0-No pain   PainLoc: Foot        Physical Exam  Constitutional:       Appearance: He is well-developed.   Cardiovascular:      Comments: Dorsalis pedis and posterior tibial pulses are diminished Bilaterally. Toes are cool to touch. Feet are warm proximally.There is decreased digital hair. Skin is atrophic, slightly hyperpigmented, and mildly edematous      Musculoskeletal:         General: Swelling and deformity present. No tenderness. Normal range of motion.      Comments: Adequate joint range of motion without pain, limitation, nor crepitation Bilateral feet and ankle joints. Muscle strength is 5/5 in all groups bilaterally.         Skin:     General: Skin is warm and dry.      Findings: No ecchymosis, erythema or lesion.   Neurological:      Mental Status: He is alert and oriented to person, place, and " time.      Comments: [place] 5.07 monofilamant testing is diminished Vinicio feet. Sharp/dull sensation diminished Bilaterally. Light touch absent Bilaterally.       Psychiatric:         Behavior: Behavior normal.       Wounds healed bilaterally.      Assessment:      Encounter Diagnoses   Name Primary?    Type II diabetes mellitus with neurological manifestations Yes    Foot ulcer, left, with fat layer exposed     Ulcer of toe, right, with necrosis of bone      Plan:     Billy was seen today for wound care and diabetes mellitus.    Diagnoses and all orders for this visit:    Type II diabetes mellitus with neurological manifestations    Foot ulcer, left, with fat layer exposed    Ulcer of toe, right, with necrosis of bone      I counseled the patient on his conditions, their implications and medical management.    Independent review of patients previous clinical notes  Reviewed current medication(s), and lab(s) specific to foot ailment(s) with patient in detail. All questions answered   Patient is status post 2nd met head resection.        Follow-up:Patient is to return to the clinic in 1 week  for follow-up but should call [facility] immediately if any signs of infection, such as fever, chills, sweats, increased redness or pain.    Short-term goals include maintaining good offloading and minimizing bioburden, promoting granulation and epithelialization to healing.  Long-term goals include keeping the wound healed by good offloading and medical management under the direction of internist.    Wound is healed, follow-up in 4 weeks.  Resume normal shoe gear/transition slowly.

## 2025-02-24 ENCOUNTER — TELEPHONE (OUTPATIENT)
Dept: OPHTHALMOLOGY | Facility: CLINIC | Age: 60
End: 2025-02-24
Payer: COMMERCIAL

## 2025-02-24 ENCOUNTER — OFFICE VISIT (OUTPATIENT)
Dept: OPTOMETRY | Facility: CLINIC | Age: 60
End: 2025-02-24
Payer: COMMERCIAL

## 2025-02-24 DIAGNOSIS — E11.311: Primary | ICD-10-CM

## 2025-02-24 DIAGNOSIS — H43.12 VITREOUS HEMORRHAGE, LEFT: ICD-10-CM

## 2025-02-24 PROCEDURE — 92134 CPTRZ OPH DX IMG PST SGM RTA: CPT | Mod: S$GLB,,, | Performed by: OPTOMETRIST

## 2025-02-24 PROCEDURE — 1160F RVW MEDS BY RX/DR IN RCRD: CPT | Mod: CPTII,S$GLB,, | Performed by: OPTOMETRIST

## 2025-02-24 PROCEDURE — 92004 COMPRE OPH EXAM NEW PT 1/>: CPT | Mod: S$GLB,,, | Performed by: OPTOMETRIST

## 2025-02-24 PROCEDURE — 2022F DILAT RTA XM EVC RTNOPTHY: CPT | Mod: CPTII,S$GLB,, | Performed by: OPTOMETRIST

## 2025-02-24 PROCEDURE — 99999 PR PBB SHADOW E&M-EST. PATIENT-LVL II: CPT | Mod: PBBFAC,,, | Performed by: OPTOMETRIST

## 2025-02-24 PROCEDURE — 1159F MED LIST DOCD IN RCRD: CPT | Mod: CPTII,S$GLB,, | Performed by: OPTOMETRIST

## 2025-02-24 NOTE — PROGRESS NOTES
"HPI    60 y/o male present today for urgent   Pt state fuzzy vision. Fog over both eyes OU   Started this morning, everything is a blurr  Last edited by Gala Sosa on 2/24/2025  2:01 PM.            Assessment /Plan     For exam results, see Encounter Report.    Diabetic retinopathy of right eye with macular edema associated with type 2 diabetes mellitus, unspecified retinopathy severity  -     OCT, Retina - OU - Both Eyes    Vitreous hemorrhage, left      DM pt w consistently high BS presents today with vision in both eye "got bad this morning".  Todays exam shows poor view in OS due to vit hem, OD shows large area pre-ret heme/CME (see mac OCT), suspected CNVM/PDR    PLAN:    Retinal consult tomorrow                   "

## 2025-02-24 NOTE — TELEPHONE ENCOUNTER
----- Message from Ivorydarline sent at 2/24/2025  9:56 AM CST -----  Regarding: Urgent Rescheduling Request  Contact: [phone]  Rescheduling Request   Appt Type:  Ep  Date/Time Preference: Today  Treating Provider: any  Caller Name: pt  Contact Preference:  [phone]: Pt is having blurry vision and vision loss. He states he cannot see in front of him. Please call to advise thank you

## 2025-02-24 NOTE — LETTER
February 24, 2025      [facility]  [address]  [place] [address]  Phone: [phone]       Patient: Billy Rivera   YOB: 1965  Date of Visit: 02/24/2025    To Whom It May Concern:    Sharron Rivera  was at [facility] on 02/24/2025. He may return to work/school on 02/25/2025 with no restrictions. If you have any questions or concerns, or if I can be of further assistance, please do not hesitate to contact me.    Sincerely,    Optometry

## 2025-02-24 NOTE — TELEPHONE ENCOUNTER
----- Message from Balbina sent at 2/24/2025  8:19 AM CST -----  Regarding: Vision Loss  Type:  Needs Medical AdviceWho Called: Billy Fernandezymptoms (please be specific): Vision Loss- everything is a fog How long has patient had these symptoms:  [facility] name and phone #:  Would the patient rather a call back or a response via [facility]?  Call [facility] Call Back Number: [phone] Information:

## 2025-02-25 ENCOUNTER — CLINICAL SUPPORT (OUTPATIENT)
Dept: OPHTHALMOLOGY | Facility: CLINIC | Age: 60
End: 2025-02-25
Payer: COMMERCIAL

## 2025-02-25 ENCOUNTER — OFFICE VISIT (OUTPATIENT)
Dept: OPHTHALMOLOGY | Facility: CLINIC | Age: 60
End: 2025-02-25
Payer: COMMERCIAL

## 2025-02-25 DIAGNOSIS — E11.3513 TYPE 2 DIABETES MELLITUS WITH BOTH EYES AFFECTED BY PROLIFERATIVE RETINOPATHY AND MACULAR EDEMA, WITH LONG-TERM CURRENT USE OF INSULIN: Primary | ICD-10-CM

## 2025-02-25 DIAGNOSIS — H43.13 VITREOUS HEMORRHAGE, BILATERAL: ICD-10-CM

## 2025-02-25 DIAGNOSIS — Z79.4 TYPE 2 DIABETES MELLITUS WITH BOTH EYES AFFECTED BY PROLIFERATIVE RETINOPATHY AND MACULAR EDEMA, WITH LONG-TERM CURRENT USE OF INSULIN: Primary | ICD-10-CM

## 2025-02-25 PROCEDURE — 67028 INJECTION EYE DRUG: CPT | Mod: 50,S$GLB,, | Performed by: OPHTHALMOLOGY

## 2025-02-25 PROCEDURE — 92134 CPTRZ OPH DX IMG PST SGM RTA: CPT | Mod: S$GLB,,, | Performed by: OPHTHALMOLOGY

## 2025-02-25 PROCEDURE — 1159F MED LIST DOCD IN RCRD: CPT | Mod: CPTII,S$GLB,, | Performed by: OPHTHALMOLOGY

## 2025-02-25 PROCEDURE — 99999 PR PBB SHADOW E&M-EST. PATIENT-LVL III: CPT | Mod: PBBFAC,,, | Performed by: OPHTHALMOLOGY

## 2025-02-25 PROCEDURE — 1160F RVW MEDS BY RX/DR IN RCRD: CPT | Mod: CPTII,S$GLB,, | Performed by: OPHTHALMOLOGY

## 2025-02-25 PROCEDURE — 92004 COMPRE OPH EXAM NEW PT 1/>: CPT | Mod: 25,S$GLB,, | Performed by: OPHTHALMOLOGY

## 2025-02-25 RX ADMIN — Medication 1.25 MG: at 03:02

## 2025-02-25 NOTE — PROGRESS NOTES
HPI     Diabetic Eye Exam     Additional comments: Cons per Dr. Gray- VH OS           Comments    Patient here today with c/o decreased VA ou, shadowing in VA ou, no pain   ou and denies floaters or flashes. No previous sx, laser, injection or   injury ou    No gtts        OCT - OD preretinal heme  OS poor view      A/P    PDR OU  T2 uncontrolled with insulin    2. VH OU  Preretinal heme OD  OS with diffuse VH    Avastin OU today    Will need PRP OU  Or PPV/EL if NI in next 1-2 months    3. HTN Ret OU  BS/BP/chol control    4. NS OU      1 month OCT and dilate OU    Risks, benefits, and alternatives to treatment discussed in detail with the patient.  The patient voiced understanding and wished to proceed with the procedure    Injection Procedure Note:  Diagnosis: PDR with VH OU    Patient Identified and Time Out complete  Pt marked  Topical Proparacaine and Betadine.  Inject Avastin OU at 6:00 @ 3.5-4mm posterior to limbus  Post Operative Dx: Same  Complications: None  Follow up as above.

## 2025-02-26 ENCOUNTER — TELEPHONE (OUTPATIENT)
Dept: PODIATRY | Facility: CLINIC | Age: 60
End: 2025-02-26
Payer: COMMERCIAL

## 2025-02-26 ENCOUNTER — TELEPHONE (OUTPATIENT)
Dept: PHARMACY | Facility: CLINIC | Age: 60
End: 2025-02-26
Payer: COMMERCIAL

## 2025-02-26 NOTE — TELEPHONE ENCOUNTER
[facility]/Population Health Chart Review & Patient Outreach Details For Medication Adherence Project    Reason for Outreach Encounter: 3rd Party payor non-compliance report ([employer], [employer], [employer], etc)  2.  Patient Outreach Method: Reviewed patient chart   3.   Medication in question:    Hypertension Medications              amLODIPine (NORVASC) 10 MG tablet Take 1 tablet (10 mg total) by mouth once daily.    carvediloL (COREG) 12.5 MG tablet Take 0.5 tablets (6.25 mg total) by mouth 2 (two) times daily with meals.                 Lisinopril dc'ed    4.  Reviewed and or Updates Made To: Patient Chart  5. Outreach Outcomes and/or actions taken: Medication discontinued  Additional Notes:

## 2025-02-26 NOTE — TELEPHONE ENCOUNTER
Call pt in regards to missed appointment on 02/26/2025 with Dr. Melara, pt answer and stated he wanting to cancel today appointment and has a appointment on 03/25/2025 due to Dr. Melara stated his foot looks good and don't have to come back weekly. Pt verbally confirmed his upcoming appointment.

## 2025-03-03 ENCOUNTER — PATIENT OUTREACH (OUTPATIENT)
Dept: ADMINISTRATIVE | Facility: HOSPITAL | Age: 60
End: 2025-03-03
Payer: COMMERCIAL

## 2025-03-03 VITALS — SYSTOLIC BLOOD PRESSURE: 137 MMHG | DIASTOLIC BLOOD PRESSURE: 76 MMHG

## 2025-03-05 ENCOUNTER — TELEPHONE (OUTPATIENT)
Dept: PODIATRY | Facility: CLINIC | Age: 60
End: 2025-03-05
Payer: COMMERCIAL

## 2025-03-05 NOTE — TELEPHONE ENCOUNTER
----- Message from Sherlyn sent at 3/5/2025  2:46 PM CST -----  Name of Who is Calling: Pt   What is the request in detail:Pt would like a call back to [facility] a f/u appt. Pt has a wound on left foot that may be reopening. Please advise thank you   [facility]  What Number to Call Back if not in [facility]:Telephone Information:Mobile          [phone]/ [phone]

## 2025-03-07 ENCOUNTER — TELEPHONE (OUTPATIENT)
Dept: PODIATRY | Facility: CLINIC | Age: 60
End: 2025-03-07
Payer: COMMERCIAL

## 2025-03-07 NOTE — TELEPHONE ENCOUNTER
Spoke with pt in regards to  appt on Monday March 10, 2025. Pt confirmed and verbalized understanding.

## 2025-03-10 ENCOUNTER — OFFICE VISIT (OUTPATIENT)
Dept: PODIATRY | Facility: CLINIC | Age: 60
End: 2025-03-10
Payer: COMMERCIAL

## 2025-03-10 ENCOUNTER — HOSPITAL ENCOUNTER (OUTPATIENT)
Dept: RADIOLOGY | Facility: HOSPITAL | Age: 60
Discharge: HOME | End: 2025-03-10
Attending: PODIATRIST
Payer: COMMERCIAL

## 2025-03-10 VITALS
HEART RATE: 46 BPM | DIASTOLIC BLOOD PRESSURE: 75 MMHG | BODY MASS INDEX: 35.66 KG/M2 | HEIGHT: 74 IN | SYSTOLIC BLOOD PRESSURE: 151 MMHG

## 2025-03-10 DIAGNOSIS — E11.49 TYPE II DIABETES MELLITUS WITH NEUROLOGICAL MANIFESTATIONS: ICD-10-CM

## 2025-03-10 DIAGNOSIS — L97.522 FOOT ULCER, LEFT, WITH FAT LAYER EXPOSED: Primary | ICD-10-CM

## 2025-03-10 DIAGNOSIS — L97.522 FOOT ULCER, LEFT, WITH FAT LAYER EXPOSED: ICD-10-CM

## 2025-03-10 PROCEDURE — 3078F DIAST BP <80 MM HG: CPT | Mod: CPTII,S$GLB,, | Performed by: PODIATRIST

## 2025-03-10 PROCEDURE — 73630 X-RAY EXAM OF FOOT: CPT | Mod: TC,LT

## 2025-03-10 PROCEDURE — 3077F SYST BP >= 140 MM HG: CPT | Mod: CPTII,S$GLB,, | Performed by: PODIATRIST

## 2025-03-10 PROCEDURE — 99999 PR PBB SHADOW E&M-EST. PATIENT-LVL IV: CPT | Mod: PBBFAC,,, | Performed by: PODIATRIST

## 2025-03-10 PROCEDURE — 3008F BODY MASS INDEX DOCD: CPT | Mod: CPTII,S$GLB,, | Performed by: PODIATRIST

## 2025-03-10 PROCEDURE — 1159F MED LIST DOCD IN RCRD: CPT | Mod: CPTII,S$GLB,, | Performed by: PODIATRIST

## 2025-03-10 PROCEDURE — 73630 X-RAY EXAM OF FOOT: CPT | Mod: 26,LT,, | Performed by: RADIOLOGY

## 2025-03-10 PROCEDURE — 11042 DBRDMT SUBQ TIS 1ST 20SQCM/<: CPT | Mod: S$GLB,,, | Performed by: PODIATRIST

## 2025-03-10 PROCEDURE — 99213 OFFICE O/P EST LOW 20 MIN: CPT | Mod: 25,S$GLB,, | Performed by: PODIATRIST

## 2025-03-10 RX ORDER — DOXYCYCLINE 100 MG/1
100 CAPSULE ORAL 2 TIMES DAILY
Qty: 20 CAPSULE | Refills: 0 | Status: SHIPPED | OUTPATIENT
Start: 2025-03-10

## 2025-03-10 RX ORDER — DOXYCYCLINE 100 MG/1
100 CAPSULE ORAL EVERY 12 HOURS
Qty: 20 CAPSULE | Refills: 0 | Status: SHIPPED | OUTPATIENT
Start: 2025-03-10 | End: 2025-03-10

## 2025-03-12 ENCOUNTER — TELEPHONE (OUTPATIENT)
Dept: DERMATOLOGY | Facility: CLINIC | Age: 60
End: 2025-03-12
Payer: COMMERCIAL

## 2025-03-13 ENCOUNTER — TELEPHONE (OUTPATIENT)
Dept: DERMATOLOGY | Facility: CLINIC | Age: 60
End: 2025-03-13
Payer: COMMERCIAL

## 2025-03-16 NOTE — PROGRESS NOTES
Subjective:     Patient ID: Billy Rivera is a 59 y.o. male.    Chief Complaint: Wound Care (Left foot) and Diabetes Mellitus    Billy is a 59 y.o. male who presents to the clinic for evaluation and treatment of high risk feet. Billy has a past medical history of Allergy, CAD (coronary artery disease), native coronary artery (06/25/2013), Cancer, COVID-19 virus detected (09/01/2020), Diabetes mellitus, Diabetes mellitus, type 2, Disorder of kidney and ureter, Heart attack (04/2012), colon cancer, stage I, Hyperlipidemia, Hypertension, Muscular pain, NSTEMI May 2013 - peak troponin 0.22 (05/22/2013), MICHAELA (obstructive sleep apnea), Retinopathy due to secondary diabetes, and Uses self-applied continuous glucose monitoring device (01/03/2025). The patient's chief complaint is diabetic foot ulcer, r. This patient has documented high risk feet requiring routine maintenance secondary to diabetes mellitis and those secondary complications of diabetes, as mentioned..  Improving/no new complaints today.  Has been in a football dressing bilateral with Darco shoes.  Right foot wound is healed.  New issue of blistering/redness left foot.    PCP: Augie Ruiz MD    Date Last Seen by PCP:   Chief Complaint   Patient presents with    Wound Care     Left foot    Diabetes Mellitus     Hemoglobin A1C   Date Value Ref Range Status   11/27/2024 10.2 (H) 4.0 - 5.6 % Final     Comment:     ADA Screening Guidelines:  5.7-6.4%  Consistent with prediabetes  >or=6.5%  Consistent with diabetes    High levels of fetal hemoglobin interfere with the HbA1C  assay. Heterozygous hemoglobin variants (HbS, HgC, etc)do  not significantly interfere with this assay.   However, presence of multiple variants may affect accuracy.     07/20/2023 8.7 (H) 4.0 - 5.6 % Final     Comment:     ADA Screening Guidelines:  5.7-6.4%  Consistent with prediabetes  >or=6.5%  Consistent with diabetes    High levels of fetal hemoglobin interfere with the  "HbA1C  assay. Heterozygous hemoglobin variants (HbS, HgC, etc)do  not significantly interfere with this assay.   However, presence of multiple variants may affect accuracy.     04/06/2023 9.4 (H) 4.0 - 5.6 % Final     Comment:     ADA Screening Guidelines:  5.7-6.4%  Consistent with prediabetes  >or=6.5%  Consistent with diabetes    High levels of fetal hemoglobin interfere with the HbA1C  assay. Heterozygous hemoglobin variants (HbS, HgC, etc)do  not significantly interfere with this assay.   However, presence of multiple variants may affect accuracy.         Review of Systems   Constitutional: Negative for chills, decreased appetite and fever.   Cardiovascular:  Negative for leg swelling.   Skin:  Positive for dry skin, nail changes and poor wound healing.   Musculoskeletal:  Negative for arthritis, joint pain, joint swelling and myalgias.   Gastrointestinal:  Negative for nausea and vomiting.   Neurological:  Positive for numbness, paresthesias and sensory change. Negative for loss of balance.        Objective:     Vitals:    03/10/25 1316   BP: (!) 151/75   Pulse: (!) 46   Height: 6' 2" (1.88 m)   PainSc: 0-No pain   PainLoc: Foot        Physical Exam  Constitutional:       Appearance: He is well-developed.   Cardiovascular:      Comments: Dorsalis pedis and posterior tibial pulses are diminished Bilaterally. Toes are cool to touch. Feet are warm proximally.There is decreased digital hair. Skin is atrophic, slightly hyperpigmented, and mildly edematous      Musculoskeletal:         General: Swelling and deformity present. No tenderness. Normal range of motion.      Comments: Adequate joint range of motion without pain, limitation, nor crepitation Bilateral feet and ankle joints. Muscle strength is 5/5 in all groups bilaterally.         Skin:     General: Skin is warm and dry.      Findings: No ecchymosis, erythema or lesion.   Neurological:      Mental Status: He is alert and oriented to person, place, and time. " "     Comments: [place]-Roge 5.07 monofilamant testing is diminished Vinicio feet. Sharp/dull sensation diminished Bilaterally. Light touch absent Bilaterally.       Psychiatric:         Behavior: Behavior normal.       Wounds healed right foot, new onset of blistering to the left partial 1st ray site measuring 0.8 cm x 0.5 cm x 0.2 cm/subcutaneous tissue.      Assessment:      Encounter Diagnoses   Name Primary?    Foot ulcer, left, with fat layer exposed Yes    Type II diabetes mellitus with neurological manifestations      Plan:     Billy was seen today for wound care and diabetes mellitus.    Diagnoses and all orders for this visit:    Foot ulcer, left, with fat layer exposed  -     X-Ray Foot Complete Left; Future    Type II diabetes mellitus with neurological manifestations  -     X-Ray Foot Complete Left; Future    Other orders  -     Discontinue: doxycycline (VIBRAMYCIN) 100 MG Cap; Take 1 capsule (100 mg total) by mouth every 12 (twelve) hours.      I counseled the patient on his conditions, their implications and medical management.    Independent review of patients previous clinical notes    Reviewed current medication(s), and lab(s) specific to foot ailment(s) with patient in detail. All questions answered   Patient is status post 2nd met head resection.      Wound Debridement    Performed by: Mingo Melara DPM   Authorized by: Patient    Time out: Immediately prior to procedure a "time out" was called to verify the correct patient, procedure, equipment, support staff and site/side marked as required.   Consent Done?:  Yes (Verbal)  Local anesthesia used?: No       Wound Details:    Location:  Left foot     Type of Debridement:  Excisional       Length (cm):  0.8       Width (cm):  0.5       Depth (cm):  0.2       Percent Debrided (%):  100             Depth of debridement:  Subcutaneous tissue    Tissue debrided:  Dermis, Epidermis and Subcutaneous    Devitalized tissue debrided:  Biofilm, Callus " and Necrotic/Eschar    Instruments:  Blade, Curette and Nippers     Bleeding:  Minimal  Hemostasis Achieved: Yes    Method Used:  Pressure  Patient tolerance:  Patient tolerated the procedure well with no immediate complications      Wound care discussed in detail.  Begin antibiotic therapy.  Radiographs ordered.  Follow-up in 1 week.

## 2025-03-19 ENCOUNTER — OFFICE VISIT (OUTPATIENT)
Dept: PODIATRY | Facility: CLINIC | Age: 60
End: 2025-03-19
Payer: COMMERCIAL

## 2025-03-19 VITALS
HEIGHT: 74 IN | SYSTOLIC BLOOD PRESSURE: 149 MMHG | BODY MASS INDEX: 35.65 KG/M2 | WEIGHT: 277.75 LBS | HEART RATE: 66 BPM | DIASTOLIC BLOOD PRESSURE: 81 MMHG

## 2025-03-19 DIAGNOSIS — L84 CORN OR CALLUS: ICD-10-CM

## 2025-03-19 DIAGNOSIS — L97.522 FOOT ULCER, LEFT, WITH FAT LAYER EXPOSED: Primary | ICD-10-CM

## 2025-03-19 DIAGNOSIS — Z89.412 LEFT GREAT TOE AMPUTEE: ICD-10-CM

## 2025-03-19 DIAGNOSIS — E11.49 TYPE II DIABETES MELLITUS WITH NEUROLOGICAL MANIFESTATIONS: ICD-10-CM

## 2025-03-19 PROCEDURE — 3077F SYST BP >= 140 MM HG: CPT | Mod: CPTII,S$GLB,, | Performed by: PODIATRIST

## 2025-03-19 PROCEDURE — 99999 PR PBB SHADOW E&M-EST. PATIENT-LVL IV: CPT | Mod: PBBFAC,,, | Performed by: PODIATRIST

## 2025-03-19 PROCEDURE — 1160F RVW MEDS BY RX/DR IN RCRD: CPT | Mod: CPTII,S$GLB,, | Performed by: PODIATRIST

## 2025-03-19 PROCEDURE — 11042 DBRDMT SUBQ TIS 1ST 20SQCM/<: CPT | Mod: S$GLB,,, | Performed by: PODIATRIST

## 2025-03-19 PROCEDURE — 1159F MED LIST DOCD IN RCRD: CPT | Mod: CPTII,S$GLB,, | Performed by: PODIATRIST

## 2025-03-19 PROCEDURE — 3008F BODY MASS INDEX DOCD: CPT | Mod: CPTII,S$GLB,, | Performed by: PODIATRIST

## 2025-03-19 PROCEDURE — 99213 OFFICE O/P EST LOW 20 MIN: CPT | Mod: 25,S$GLB,, | Performed by: PODIATRIST

## 2025-03-19 PROCEDURE — 3079F DIAST BP 80-89 MM HG: CPT | Mod: CPTII,S$GLB,, | Performed by: PODIATRIST

## 2025-03-19 NOTE — PROGRESS NOTES
Subjective:     Patient ID: Billy Rivera is a 59 y.o. male.    Chief Complaint: Wound Care (Left foot ulcer)    Billy is a 59 y.o. male who presents to the clinic for evaluation and treatment of high risk feet. Billy has a past medical history of Allergy, CAD (coronary artery disease), native coronary artery (06/25/2013), Cancer, COVID-19 virus detected (09/01/2020), Diabetes mellitus, Diabetes mellitus, type 2, Disorder of kidney and ureter, Heart attack (04/2012), colon cancer, stage I, Hyperlipidemia, Hypertension, Muscular pain, NSTEMI May 2013 - peak troponin 0.22 (05/22/2013), MICHAELA (obstructive sleep apnea), Retinopathy due to secondary diabetes, and Uses self-applied continuous glucose monitoring device (01/03/2025). The patient's chief complaint is diabetic foot ulcer, r. This patient has documented high risk feet requiring routine maintenance secondary to diabetes mellitis and those secondary complications of diabetes, as mentioned..  Improving/no new complaints today.  Has been in a football dressing bilateral with Darco shoes.  Right foot wound is healed.  Ongoing wound of left first ray.     PCP: Augie Ruiz MD    Date Last Seen by PCP:   Chief Complaint   Patient presents with    Wound Care     Left foot ulcer     Hemoglobin A1C   Date Value Ref Range Status   11/27/2024 10.2 (H) 4.0 - 5.6 % Final     Comment:     ADA Screening Guidelines:  5.7-6.4%  Consistent with prediabetes  >or=6.5%  Consistent with diabetes    High levels of fetal hemoglobin interfere with the HbA1C  assay. Heterozygous hemoglobin variants (HbS, HgC, etc)do  not significantly interfere with this assay.   However, presence of multiple variants may affect accuracy.     07/20/2023 8.7 (H) 4.0 - 5.6 % Final     Comment:     ADA Screening Guidelines:  5.7-6.4%  Consistent with prediabetes  >or=6.5%  Consistent with diabetes    High levels of fetal hemoglobin interfere with the HbA1C  assay. Heterozygous hemoglobin  "variants (HbS, HgC, etc)do  not significantly interfere with this assay.   However, presence of multiple variants may affect accuracy.     04/06/2023 9.4 (H) 4.0 - 5.6 % Final     Comment:     ADA Screening Guidelines:  5.7-6.4%  Consistent with prediabetes  >or=6.5%  Consistent with diabetes    High levels of fetal hemoglobin interfere with the HbA1C  assay. Heterozygous hemoglobin variants (HbS, HgC, etc)do  not significantly interfere with this assay.   However, presence of multiple variants may affect accuracy.         Review of Systems   Constitutional: Negative for chills, decreased appetite and fever.   Cardiovascular:  Negative for leg swelling.   Skin:  Positive for dry skin, nail changes and poor wound healing.   Musculoskeletal:  Negative for arthritis, joint pain, joint swelling and myalgias.   Gastrointestinal:  Negative for nausea and vomiting.   Neurological:  Positive for numbness, paresthesias and sensory change. Negative for loss of balance.        Objective:     Vitals:    03/19/25 0914   BP: (!) 149/81   Pulse: 66   Weight: 126 kg (277 lb 12.5 oz)   Height: 6' 2" (1.88 m)   PainSc: 0-No pain        Physical Exam  Constitutional:       Appearance: He is well-developed.   Cardiovascular:      Comments: Dorsalis pedis and posterior tibial pulses are diminished Bilaterally. Toes are cool to touch. Feet are warm proximally.There is decreased digital hair. Skin is atrophic, slightly hyperpigmented, and mildly edematous      Musculoskeletal:         General: Swelling and deformity present. No tenderness. Normal range of motion.      Comments: Adequate joint range of motion without pain, limitation, nor crepitation Bilateral feet and ankle joints. Muscle strength is 5/5 in all groups bilaterally.         Skin:     General: Skin is warm and dry.      Findings: No ecchymosis, erythema or lesion.   Neurological:      Mental Status: He is alert and oriented to person, place, and time.      Comments: " "[place]-Roge 5.07 monofilamant testing is diminished Vinicio feet. Sharp/dull sensation diminished Bilaterally. Light touch absent Bilaterally.       Psychiatric:         Behavior: Behavior normal.       Wounds healed right foot,     new onset of blistering to the left partial 1st ray site measuring 0.8 cm x 0.5 cm x 0.2 cm/subcutaneous tissue, periwound hyperkeratosis.       Assessment:      Encounter Diagnoses   Name Primary?    Foot ulcer, left, with fat layer exposed Yes    Type II diabetes mellitus with neurological manifestations     Left great toe amputee     Corn or callus        Plan:     Billy was seen today for wound care.    Diagnoses and all orders for this visit:    Foot ulcer, left, with fat layer exposed    Type II diabetes mellitus with neurological manifestations    Left great toe amputee    Corn or callus        I counseled the patient on his conditions, their implications and medical management.    Independent review of patients previous clinical notes    Reviewed current medication(s), and lab(s) specific to foot ailment(s) with patient in detail. All questions answered   Patient is status post 2nd met head resection.      Radiographs as ordered last visit discussed. No acute process. Stump of first metatarsal abutting close to skin.     Wound Debridement    Performed by: Mingo Melara DPM   Authorized by: Patient    Time out: Immediately prior to procedure a "time out" was called to verify the correct patient, procedure, equipment, support staff and site/side marked as required.   Consent Done?:  Yes (Verbal)  Local anesthesia used?: No       Wound Details:    Location:  Left foot     Type of Debridement:  Excisional       Length (cm):  0.8       Width (cm):  0.5       Depth (cm):  0.2       Percent Debrided (%):  100             Depth of debridement:  Subcutaneous tissue    Tissue debrided:  Dermis, Epidermis and Subcutaneous    Devitalized tissue debrided:  Biofilm, Callus and " Necrotic/Eschar    Instruments:  Blade, Curette and Nippers     Bleeding:  Minimal  Hemostasis Achieved: Yes    Method Used:  Pressure  Patient tolerance:  Patient tolerated the procedure well with no immediate complications      Wound care discussed in detail. Leticia, football dressing applied.     RTC 1 week.          * Juanjose Luna, DPM - Resident - 1st assist

## 2025-03-21 ENCOUNTER — TELEPHONE (OUTPATIENT)
Dept: PHARMACY | Facility: CLINIC | Age: 60
End: 2025-03-21
Payer: COMMERCIAL

## 2025-03-21 NOTE — TELEPHONE ENCOUNTER
[facility]/Population Health Chart Review & Patient Outreach Details For Medication Adherence Project    Reason for Outreach Encounter: 3rd Party payor non-compliance report ([employer], [employer], C, etc)  2.  Patient Outreach Method: Reviewed Patient Chart  3.   Medication in question: lisinopril    LAST FILLED: n/a  Hypertension Medications              amLODIPine (NORVASC) 10 MG tablet Take 1 tablet (10 mg total) by mouth once daily.    carvediloL (COREG) 12.5 MG tablet Take 0.5 tablets (6.25 mg total) by mouth 2 (two) times daily with meals.              4.  Reviewed and or Updates Made To: Patient Chart  5. Outreach Outcomes and/or actions taken: Medication discontinued    Additional Notes:

## 2025-03-26 ENCOUNTER — OFFICE VISIT (OUTPATIENT)
Dept: PODIATRY | Facility: CLINIC | Age: 60
End: 2025-03-26
Payer: COMMERCIAL

## 2025-03-26 VITALS
HEIGHT: 74 IN | HEART RATE: 51 BPM | WEIGHT: 280 LBS | BODY MASS INDEX: 35.94 KG/M2 | DIASTOLIC BLOOD PRESSURE: 76 MMHG | SYSTOLIC BLOOD PRESSURE: 137 MMHG

## 2025-03-26 DIAGNOSIS — E11.49 TYPE II DIABETES MELLITUS WITH NEUROLOGICAL MANIFESTATIONS: ICD-10-CM

## 2025-03-26 DIAGNOSIS — L97.522 FOOT ULCER, LEFT, WITH FAT LAYER EXPOSED: Primary | ICD-10-CM

## 2025-03-26 PROCEDURE — 3008F BODY MASS INDEX DOCD: CPT | Mod: CPTII,S$GLB,, | Performed by: PODIATRIST

## 2025-03-26 PROCEDURE — 3075F SYST BP GE 130 - 139MM HG: CPT | Mod: CPTII,S$GLB,, | Performed by: PODIATRIST

## 2025-03-26 PROCEDURE — 99999 PR PBB SHADOW E&M-EST. PATIENT-LVL IV: CPT | Mod: PBBFAC,,, | Performed by: PODIATRIST

## 2025-03-26 PROCEDURE — 3078F DIAST BP <80 MM HG: CPT | Mod: CPTII,S$GLB,, | Performed by: PODIATRIST

## 2025-03-26 PROCEDURE — 99213 OFFICE O/P EST LOW 20 MIN: CPT | Mod: S$GLB,,, | Performed by: PODIATRIST

## 2025-04-01 ENCOUNTER — TELEPHONE (OUTPATIENT)
Dept: OPHTHALMOLOGY | Facility: CLINIC | Age: 60
End: 2025-04-01

## 2025-04-01 ENCOUNTER — PROCEDURE VISIT (OUTPATIENT)
Dept: OPHTHALMOLOGY | Facility: CLINIC | Age: 60
End: 2025-04-01
Payer: COMMERCIAL

## 2025-04-01 ENCOUNTER — CLINICAL SUPPORT (OUTPATIENT)
Dept: OPHTHALMOLOGY | Facility: CLINIC | Age: 60
End: 2025-04-01
Payer: COMMERCIAL

## 2025-04-01 DIAGNOSIS — H43.13 VITREOUS HEMORRHAGE, BILATERAL: ICD-10-CM

## 2025-04-01 DIAGNOSIS — H43.13 VITREOUS HEMORRHAGE OF BOTH EYES: Primary | ICD-10-CM

## 2025-04-01 DIAGNOSIS — Z79.4 TYPE 2 DIABETES MELLITUS WITH BOTH EYES AFFECTED BY PROLIFERATIVE RETINOPATHY AND MACULAR EDEMA, WITH LONG-TERM CURRENT USE OF INSULIN: Primary | ICD-10-CM

## 2025-04-01 DIAGNOSIS — E11.3513 TYPE 2 DIABETES MELLITUS WITH BOTH EYES AFFECTED BY PROLIFERATIVE RETINOPATHY AND MACULAR EDEMA, WITH LONG-TERM CURRENT USE OF INSULIN: Primary | ICD-10-CM

## 2025-04-01 PROCEDURE — 92014 COMPRE OPH EXAM EST PT 1/>: CPT | Mod: 25,S$GLB,, | Performed by: OPHTHALMOLOGY

## 2025-04-01 PROCEDURE — 67028 INJECTION EYE DRUG: CPT | Mod: 50,S$GLB,, | Performed by: OPHTHALMOLOGY

## 2025-04-01 PROCEDURE — 92134 CPTRZ OPH DX IMG PST SGM RTA: CPT | Mod: S$GLB,,, | Performed by: OPHTHALMOLOGY

## 2025-04-01 RX ADMIN — Medication 1.5 MG: at 02:04

## 2025-04-01 NOTE — PROGRESS NOTES
HPI     Blurred Vision     Additional comments: 1 mo f/u Avastin ou           Comments    C/o blurry VA OS > OD no pain ou and floaters without flashes.          Last edited by Mandy Rivera on 4/1/2025  1:18 PM.          OCT - OD preretinal heme - improving  OS  some clearance but still sig central hemorrhage      A/P    PDR OU  T2 uncontrolled with insulin    2. VH OU  Preretinal heme OD  OS with diffuse VH  S/p Avastin OU x 1    Avastin OU today    Will need PRP OD - as heme continuing to improve    OS will need PPV    Plan 25g PPV/EL/partial AFx/Avastin OS     Local MAC  LOC 45 min    Risks, benefits, and alternatives to treatment discussed in detail with the patient.  The patient voiced understanding and wished to proceed with the procedure        3. HTN Ret OU  BS/BP/chol control    4. NS OU      To OR    Risks, benefits, and alternatives to treatment discussed in detail with the patient.  The patient voiced understanding and wished to proceed with the procedure    Injection Procedure Note:  Diagnosis: PDR with VH OU    Patient Identified and Time Out complete  Pt marked  Topical Proparacaine and Betadine.  Inject Avastin OU at 6:00 @ 3.5-4mm posterior to limbus  Post Operative Dx: Same  Complications: None  Follow up as above.

## 2025-04-02 NOTE — PROGRESS NOTES
Subjective:     Patient ID: Billy Rivera is a 59 y.o. male.    Chief Complaint: Wound Care (Left foot )    Billy is a 59 y.o. male who presents to the clinic for evaluation and treatment of high risk feet. Billy has a past medical history of Allergy, CAD (coronary artery disease), native coronary artery (06/25/2013), Cancer, COVID-19 virus detected (09/01/2020), Diabetes mellitus, Diabetes mellitus, type 2, Disorder of kidney and ureter, Heart attack (04/2012), colon cancer, stage I, Hyperlipidemia, Hypertension, Muscular pain, NSTEMI May 2013 - peak troponin 0.22 (05/22/2013), MICHAELA (obstructive sleep apnea), Retinopathy due to secondary diabetes, and Uses self-applied continuous glucose monitoring device (01/03/2025). The patient's chief complaint is diabetic foot ulcer, r. This patient has documented high risk feet requiring routine maintenance secondary to diabetes mellitis and those secondary complications of diabetes, as mentioned..  Improving/no new complaints today.  Has been in a football dressing bilateral with Darco shoes.  Right foot wound is healed.  Improving left first ray.     PCP: Augie Riuz MD    Date Last Seen by PCP:   Chief Complaint   Patient presents with    Wound Care     Left foot      Hemoglobin A1C   Date Value Ref Range Status   11/27/2024 10.2 (H) 4.0 - 5.6 % Final     Comment:     ADA Screening Guidelines:  5.7-6.4%  Consistent with prediabetes  >or=6.5%  Consistent with diabetes    High levels of fetal hemoglobin interfere with the HbA1C  assay. Heterozygous hemoglobin variants (HbS, HgC, etc)do  not significantly interfere with this assay.   However, presence of multiple variants may affect accuracy.     07/20/2023 8.7 (H) 4.0 - 5.6 % Final     Comment:     ADA Screening Guidelines:  5.7-6.4%  Consistent with prediabetes  >or=6.5%  Consistent with diabetes    High levels of fetal hemoglobin interfere with the HbA1C  assay. Heterozygous hemoglobin variants (HbS, HgC,  "etc)do  not significantly interfere with this assay.   However, presence of multiple variants may affect accuracy.     04/06/2023 9.4 (H) 4.0 - 5.6 % Final     Comment:     ADA Screening Guidelines:  5.7-6.4%  Consistent with prediabetes  >or=6.5%  Consistent with diabetes    High levels of fetal hemoglobin interfere with the HbA1C  assay. Heterozygous hemoglobin variants (HbS, HgC, etc)do  not significantly interfere with this assay.   However, presence of multiple variants may affect accuracy.         Review of Systems   Constitutional: Negative for chills, decreased appetite and fever.   Cardiovascular:  Negative for leg swelling.   Skin:  Positive for dry skin, nail changes and poor wound healing.   Musculoskeletal:  Negative for arthritis, joint pain, joint swelling and myalgias.   Gastrointestinal:  Negative for nausea and vomiting.   Neurological:  Positive for numbness, paresthesias and sensory change. Negative for loss of balance.        Objective:     Vitals:    03/26/25 0806   BP: 137/76   Pulse: (!) 51   Weight: 127 kg (279 lb 15.8 oz)   Height: 6' 2" (1.88 m)   PainSc: 0-No pain   PainLoc: Foot        Physical Exam  Constitutional:       Appearance: He is well-developed.   Cardiovascular:      Comments: Dorsalis pedis and posterior tibial pulses are diminished Bilaterally. Toes are cool to touch. Feet are warm proximally.There is decreased digital hair. Skin is atrophic, slightly hyperpigmented, and mildly edematous      Musculoskeletal:         General: Swelling and deformity present. No tenderness. Normal range of motion.      Comments: Adequate joint range of motion without pain, limitation, nor crepitation Bilateral feet and ankle joints. Muscle strength is 5/5 in all groups bilaterally.         Skin:     General: Skin is warm and dry.      Findings: No ecchymosis, erythema or lesion.   Neurological:      Mental Status: He is alert and oriented to person, place, and time.      Comments: " Lyme-Roge 5.07 monofilamant testing is diminished Vinicio feet. Sharp/dull sensation diminished Bilaterally. Light touch absent Bilaterally.       Psychiatric:         Behavior: Behavior normal.       Wounds healed right foot,     new onset of blistering to the left partial 1st ray site measuring 0.8 cm x 0.5 cm x 0.2 cm/subcutaneous tissue, periwound hyperkeratosis.       Assessment:      Encounter Diagnoses   Name Primary?    Foot ulcer, left, with fat layer exposed Yes    Type II diabetes mellitus with neurological manifestations        Plan:     Billy was seen today for wound care.    Diagnoses and all orders for this visit:    Foot ulcer, left, with fat layer exposed    Type II diabetes mellitus with neurological manifestations        I counseled the patient on his conditions, their implications and medical management.    Independent review of patients previous clinical notes    Reviewed current medication(s), and lab(s) specific to foot ailment(s) with patient in detail. All questions answered   Patient is status post 2nd met head resection.      Radiographs as ordered last visit discussed. No acute process. Stump of first metatarsal abutting close to skin.       RTC 1 week.          * Juanjose Luna DPM - Resident - 1st assist

## 2025-04-08 ENCOUNTER — TELEPHONE (OUTPATIENT)
Dept: OPHTHALMOLOGY | Facility: CLINIC | Age: 60
End: 2025-04-08
Payer: COMMERCIAL

## 2025-04-08 ENCOUNTER — TELEPHONE (OUTPATIENT)
Dept: PODIATRY | Facility: CLINIC | Age: 60
End: 2025-04-08
Payer: COMMERCIAL

## 2025-04-08 NOTE — TELEPHONE ENCOUNTER
Call pt in regards to missed appointment on 04/07/2025 with Dr. Melara. Patient answer and I was able to get him rescheduled for next availability patient verbally confirmed new appointment date and time.

## 2025-04-08 NOTE — H&P
Pre-Operative History & Physical  Ophthalmology      SUBJECTIVE:     History of Present Illness:  Patient is a 59 y.o. male presents with Vitreous hemorrhage of both eyes [H43.13].    MEDICATIONS:   No medications prior to admission.       ALLERGIES:   Review of patient's allergies indicates:   Allergen Reactions    Penicillins Other (See Comments)     PCN allergy as a child - was told he went into a coma. Tolerates Cefazolin without adverse reactions    Shellfish containing products      Other reaction(s): Unknown    Vancomycin Itching     Tolerated vancomycin 7/2020    Bactrim  [sulfamethoxazole-trimethoprim] Rash       PAST MEDICAL HISTORY:   Past Medical History:   Diagnosis Date    Allergy     CAD (coronary artery disease), native coronary artery 06/25/2013    Cancer     COVID-19 virus detected 09/01/2020    Diabetes mellitus     Diabetes mellitus, type 2     Disorder of kidney and ureter     Heart attack 04/2012    Hx of colon cancer, stage I     Hyperlipidemia     Hypertension     Muscular pain     post-op after colonoscopy    NSTEMI May 2013 - peak troponin 0.22 05/22/2013    MICHAELA (obstructive sleep apnea)     Retinopathy due to secondary diabetes     Uses self-applied continuous glucose monitoring device 01/03/2025     PAST SURGICAL HISTORY:   Past Surgical History:   Procedure Laterality Date    COLONOSCOPY N/A 2/17/2017    Procedure: COLONOSCOPY;  Surgeon: Simon Gomez MD;  Location: [facility] (4TH FLR);  Service: Endoscopy;  Laterality: N/A;    CORONARY ANGIOPLASTY      EXCISION, TARSAL OR METATARSAL BONE, EXCLUDING TALUS OR CALCANEUS, PARTIAL Right 12/6/2024    Procedure: EXCISION,TARSAL OR METATARSAL BONE,EXCLUDING TALUS OR CALCANEUS,PARTIAL;  Surgeon: Mingo Melara DPM;  Location: [facility] OR;  Service: Podiatry;  Laterality: Right;    INCISION AND DRAINAGE FOOT Right 6/5/2018    Procedure: INCISION AND DRAINAGE, FOOT;  Surgeon: Bridget Santana DPM;  [address]  Service: Podiatry;   Laterality: Right;  Request mini C arm in room. request wound vac with medium black sponge    INCISION AND DRAINAGE FOOT Right 6/7/2018    Procedure: INCISION AND DRAINAGE, FOOT;  Surgeon: Emelia Brock DPM;  Location: [facility] OR 2ND FLR;  Service: Podiatry;  Laterality: Right;    INCISION AND DRAINAGE FOOT Left 9/4/2020    Procedure: INCISION AND DRAINAGE, FOOT;  Surgeon: Ainsley Powell DPM;  Location: [facility] OR 2ND FLR;  Service: Podiatry;  Laterality: Left;    INCISION AND DRAINAGE FOOT Left 12/22/2020    Procedure: INCISION AND DRAINAGE, FOOT;  Surgeon: Ainsley Powell DPM;  Location: [facility] OR 2ND FLR;  Service: Podiatry;  Laterality: Left;  May need micro choice  Need Jam shidi needles  Stretcher OK      INCISION AND DRAINAGE OF ABSCESS Right 6/2/2018    Procedure: INCISION AND DRAINAGE, ABSCESS;  Surgeon: Bridget Santana DPM;  [facility]  Service: General;  Laterality: Right;    OSTEOTOMY OF METATARSAL BONE  9/4/2020    Procedure: OSTEOTOMY, METATARSAL BONE, 1st METATARSAL RESECTION;  Surgeon: Ainsley Powell DPM;  Location: [facility] OR 2ND FLR;  Service: Podiatry;;    REMOVAL OF FOREIGN BODY FROM FOOT Right 6/7/2018    Procedure: REMOVAL, FOREIGN BODY, FOOT;  Surgeon: Emelia Brock DPM;  Location: [facility] OR 2ND FLR;  Service: Podiatry;  Laterality: Right;    TOE AMPUTATION Left 7/4/2020    Procedure: AMPUTATION, TOE;  Surgeon: Bridget Santana DPM;  Location: [facility] OR 2ND FLR;  Service: Podiatry;  Laterality: Left;    TOE AMPUTATION Left 10/14/2020    Procedure: AMPUTATION, TOE;  Surgeon: Mingo Melara DPM;  Location: [facility] OR 2ND FLR;  Service: Podiatry;  Laterality: Left;  stretcher OK    TOE AMPUTATION Right 6/9/2022    Procedure: AMPUTATION, TOE - Dr. Melara @ [place] in am;  Surgeon: Mingo Melara DPM;  Location: [facility] OR 1ST FLR;  Service: Podiatry;  Laterality: Right;    TOE AMPUTATION Right 11/23/2022    Procedure: AMPUTATION, TOE;  Surgeon: Hansa Robertson DPM;  Location: [facility]  OR 2ND FLR;  Service: Podiatry;  Laterality: Right;    TOE AMPUTATION Right 11/25/2022    Procedure: AMPUTATION, TOE;  Surgeon: Chris Groves DPM;  [facility];  Service: Podiatry;  Laterality: Right;    TONSILLECTOMY       PAST FAMILY HISTORY:   Family History   Problem Relation Name Age of Onset    Heart disease Mother      Diabetes Mother      Diabetes Father      Heart disease Brother      Diabetes Maternal Grandmother      Diabetes Maternal Grandfather      Diabetes Paternal Grandmother      Diabetes Paternal Grandfather      Anesthesia problems Neg Hx       SOCIAL HISTORY: Social History[1]     MENTAL STATUS: Alert    REVIEW OF SYSTEMS: Negative    OBJECTIVE:     Vital Signs (Most Recent)       Physical Exam:  General: NAD  HEENT: AT/NC, PDR/NCVH OS  Lungs: Adequate respirations  Heart: + pulses  Abdomen: Soft    ASSESSMENT/PLAN:     Patient is a 59 y.o. male with Vitreous hemorrhage of both eyes [H43.13]      - Risks/benefits/alternatives of the procedure including, but not limited to, infection, bleeding, pain, ptosis, macular edema, corneal edema, persistent corneal defect, retinal tears, retinal detachment, epiretinal membrane, elevated intraocular pressure, hypotony, cataract formation, possible need for strict post-op head positioning, possible temporary avoidance of air travel, loss of vision, loss of the eye, paralysis, and death were discussed with the patient and/or family. The patient/family voiced good understanding, the informed consent was signed, witnessed, and placed in chart. All patient and family questions were answered.   - Will proceed with 25G PPV/EL/partial AFx/Avastin OS  - Plan for MAC with retrobulbar block anesthesia   - Allergies reviewed:   Review of patient's allergies indicates:   Allergen Reactions    Penicillins Other (See Comments)     PCN allergy as a child - was told he went into a coma. Tolerates Cefazolin without adverse reactions    Shellfish containing  products      Other reaction(s): Unknown    Vancomycin Itching     Tolerated vancomycin 7/2020    Bactrim  [sulfamethoxazole-trimethoprim] Rash         Supplies Needed  Retrobulbar block, 25 gauge vitrectomy, Dextrose in bottle, Laser probe and pedal, and Avastin      Jesse Conde MD  Vitreoretinal Surgery   Department of Ophthalmology       [1]   Social History  Tobacco Use    Smoking status: Never     Passive exposure: Never    Smokeless tobacco: Never   Substance Use Topics    Alcohol use: Yes     Comment: rare x1 beer-     Drug use: No

## 2025-04-09 ENCOUNTER — ANESTHESIA (OUTPATIENT)
Dept: SURGERY | Facility: HOSPITAL | Age: 60
End: 2025-04-09
Payer: COMMERCIAL

## 2025-04-09 ENCOUNTER — HOSPITAL ENCOUNTER (OUTPATIENT)
Facility: HOSPITAL | Age: 60
Discharge: HOME | End: 2025-04-09
Attending: OPHTHALMOLOGY | Admitting: OPHTHALMOLOGY
Payer: COMMERCIAL

## 2025-04-09 ENCOUNTER — ANESTHESIA EVENT (OUTPATIENT)
Dept: SURGERY | Facility: HOSPITAL | Age: 60
End: 2025-04-09
Payer: COMMERCIAL

## 2025-04-09 VITALS
OXYGEN SATURATION: 99 % | SYSTOLIC BLOOD PRESSURE: 165 MMHG | TEMPERATURE: 98 F | HEART RATE: 60 BPM | RESPIRATION RATE: 20 BRPM | DIASTOLIC BLOOD PRESSURE: 82 MMHG

## 2025-04-09 DIAGNOSIS — H43.12 VITREOUS HEMORRHAGE, LEFT EYE: ICD-10-CM

## 2025-04-09 DIAGNOSIS — E11.3513 TYPE 2 DIABETES MELLITUS WITH BOTH EYES AFFECTED BY PROLIFERATIVE RETINOPATHY AND MACULAR EDEMA, WITH LONG-TERM CURRENT USE OF INSULIN: ICD-10-CM

## 2025-04-09 DIAGNOSIS — Z79.4 TYPE 2 DIABETES MELLITUS WITH BOTH EYES AFFECTED BY PROLIFERATIVE RETINOPATHY AND MACULAR EDEMA, WITH LONG-TERM CURRENT USE OF INSULIN: ICD-10-CM

## 2025-04-09 DIAGNOSIS — Z79.4 TYPE 2 DIABETES MELLITUS WITH LEFT EYE AFFECTED BY PROLIFERATIVE RETINOPATHY AND TRACTION RETINAL DETACHMENT NOT INVOLVING MACULA, WITH LONG-TERM CURRENT USE OF INSULIN: ICD-10-CM

## 2025-04-09 DIAGNOSIS — E11.3532 TYPE 2 DIABETES MELLITUS WITH LEFT EYE AFFECTED BY PROLIFERATIVE RETINOPATHY AND TRACTION RETINAL DETACHMENT NOT INVOLVING MACULA, WITH LONG-TERM CURRENT USE OF INSULIN: ICD-10-CM

## 2025-04-09 DIAGNOSIS — H43.13 VITREOUS HEMORRHAGE, BILATERAL: Primary | ICD-10-CM

## 2025-04-09 LAB
POCT GLUCOSE: 264 MG/DL (ref 70–110)
POCT GLUCOSE: 308 MG/DL (ref 70–110)

## 2025-04-09 PROCEDURE — 37000009 HC ANESTHESIA EA ADD 15 MINS: Performed by: OPHTHALMOLOGY

## 2025-04-09 PROCEDURE — 82962 GLUCOSE BLOOD TEST: CPT | Performed by: OPHTHALMOLOGY

## 2025-04-09 PROCEDURE — 36000707: Performed by: OPHTHALMOLOGY

## 2025-04-09 PROCEDURE — 25000003 PHARM REV CODE 250: Performed by: STUDENT IN AN ORGANIZED HEALTH CARE EDUCATION/TRAINING PROGRAM

## 2025-04-09 PROCEDURE — 71000044 HC DOSC ROUTINE RECOVERY FIRST HOUR: Performed by: OPHTHALMOLOGY

## 2025-04-09 PROCEDURE — 25000003 PHARM REV CODE 250

## 2025-04-09 PROCEDURE — 67108 REPAIR DETACHED RETINA: CPT | Mod: LT,,, | Performed by: OPHTHALMOLOGY

## 2025-04-09 PROCEDURE — 63600175 PHARM REV CODE 636 W HCPCS: Performed by: OPHTHALMOLOGY

## 2025-04-09 PROCEDURE — 25000003 PHARM REV CODE 250: Performed by: OPHTHALMOLOGY

## 2025-04-09 PROCEDURE — 63600175 PHARM REV CODE 636 W HCPCS: Mod: JZ,TB

## 2025-04-09 PROCEDURE — C1784 OCULAR DEV, INTRAOP, DET RET: HCPCS | Performed by: OPHTHALMOLOGY

## 2025-04-09 PROCEDURE — 36000706: Performed by: OPHTHALMOLOGY

## 2025-04-09 PROCEDURE — 37000008 HC ANESTHESIA 1ST 15 MINUTES: Performed by: OPHTHALMOLOGY

## 2025-04-09 PROCEDURE — 63600175 PHARM REV CODE 636 W HCPCS

## 2025-04-09 PROCEDURE — 71000015 HC POSTOP RECOV 1ST HR: Performed by: OPHTHALMOLOGY

## 2025-04-09 RX ORDER — HYDROCODONE BITARTRATE AND ACETAMINOPHEN 5; 325 MG/1; MG/1
1 TABLET ORAL EVERY 4 HOURS PRN
Status: DISCONTINUED | OUTPATIENT
Start: 2025-04-09 | End: 2025-04-09 | Stop reason: HOSPADM

## 2025-04-09 RX ORDER — BUPIVACAINE HYDROCHLORIDE 7.5 MG/ML
INJECTION, SOLUTION EPIDURAL; RETROBULBAR
Status: DISCONTINUED | OUTPATIENT
Start: 2025-04-09 | End: 2025-04-09 | Stop reason: HOSPADM

## 2025-04-09 RX ORDER — NEOMYCIN SULFATE, POLYMYXIN B SULFATE, AND DEXAMETHASONE 3.5; 10000; 1 MG/G; [USP'U]/G; MG/G
OINTMENT OPHTHALMIC
Status: DISCONTINUED | OUTPATIENT
Start: 2025-04-09 | End: 2025-04-09 | Stop reason: HOSPADM

## 2025-04-09 RX ORDER — LIDOCAINE HYDROCHLORIDE 20 MG/ML
INJECTION, SOLUTION EPIDURAL; INFILTRATION; INTRACAUDAL; PERINEURAL
Status: DISCONTINUED
Start: 2025-04-09 | End: 2025-04-09 | Stop reason: HOSPADM

## 2025-04-09 RX ORDER — TETRACAINE HYDROCHLORIDE 5 MG/ML
1 SOLUTION OPHTHALMIC
Status: DISCONTINUED | OUTPATIENT
Start: 2025-04-09 | End: 2025-04-09 | Stop reason: HOSPADM

## 2025-04-09 RX ORDER — NEOMYCIN SULFATE, POLYMYXIN B SULFATE, AND DEXAMETHASONE 3.5; 10000; 1 MG/G; [USP'U]/G; MG/G
OINTMENT OPHTHALMIC
Status: DISCONTINUED
Start: 2025-04-09 | End: 2025-04-09 | Stop reason: HOSPADM

## 2025-04-09 RX ORDER — EPINEPHRINE 1 MG/ML
INJECTION, SOLUTION, CONCENTRATE INTRAVENOUS
Status: DISCONTINUED | OUTPATIENT
Start: 2025-04-09 | End: 2025-04-09 | Stop reason: HOSPADM

## 2025-04-09 RX ORDER — MIDAZOLAM HYDROCHLORIDE 1 MG/ML
INJECTION INTRAMUSCULAR; INTRAVENOUS
Status: DISCONTINUED | OUTPATIENT
Start: 2025-04-09 | End: 2025-04-09

## 2025-04-09 RX ORDER — BUPIVACAINE HYDROCHLORIDE 7.5 MG/ML
INJECTION, SOLUTION EPIDURAL; RETROBULBAR
Status: DISCONTINUED
Start: 2025-04-09 | End: 2025-04-09 | Stop reason: HOSPADM

## 2025-04-09 RX ORDER — EPINEPHRINE 1 MG/ML
INJECTION, SOLUTION, CONCENTRATE INTRAVENOUS
Status: DISCONTINUED
Start: 2025-04-09 | End: 2025-04-09 | Stop reason: HOSPADM

## 2025-04-09 RX ORDER — PROPOFOL 10 MG/ML
VIAL (ML) INTRAVENOUS
Status: DISCONTINUED | OUTPATIENT
Start: 2025-04-09 | End: 2025-04-09

## 2025-04-09 RX ORDER — GLUCAGON 1 MG
1 KIT INJECTION
Status: DISCONTINUED | OUTPATIENT
Start: 2025-04-09 | End: 2025-04-09 | Stop reason: HOSPADM

## 2025-04-09 RX ORDER — ONDANSETRON 4 MG/1
4 TABLET, FILM COATED ORAL EVERY 8 HOURS PRN
Qty: 12 TABLET | Refills: 0 | Status: SHIPPED | OUTPATIENT
Start: 2025-04-09

## 2025-04-09 RX ORDER — PREDNISOLONE ACETATE 10 MG/ML
1 SUSPENSION/ DROPS OPHTHALMIC
Status: DISCONTINUED | OUTPATIENT
Start: 2025-04-09 | End: 2025-04-09 | Stop reason: HOSPADM

## 2025-04-09 RX ORDER — SODIUM CHLORIDE 0.9 % (FLUSH) 0.9 %
10 SYRINGE (ML) INJECTION
Status: DISCONTINUED | OUTPATIENT
Start: 2025-04-09 | End: 2025-04-09 | Stop reason: HOSPADM

## 2025-04-09 RX ORDER — MOXIFLOXACIN 5 MG/ML
1 SOLUTION/ DROPS OPHTHALMIC
Status: DISCONTINUED | OUTPATIENT
Start: 2025-04-09 | End: 2025-04-09 | Stop reason: HOSPADM

## 2025-04-09 RX ORDER — ACETAMINOPHEN 325 MG/1
650 TABLET ORAL EVERY 4 HOURS PRN
Status: DISCONTINUED | OUTPATIENT
Start: 2025-04-09 | End: 2025-04-09 | Stop reason: HOSPADM

## 2025-04-09 RX ORDER — VANCOMYCIN HYDROCHLORIDE 500 MG/10ML
INJECTION, POWDER, LYOPHILIZED, FOR SOLUTION INTRAVENOUS
Status: DISCONTINUED
Start: 2025-04-09 | End: 2025-04-09 | Stop reason: HOSPADM

## 2025-04-09 RX ORDER — PHENYLEPHRINE HYDROCHLORIDE 25 MG/ML
1 SOLUTION/ DROPS OPHTHALMIC
Status: DISCONTINUED | OUTPATIENT
Start: 2025-04-09 | End: 2025-04-09 | Stop reason: HOSPADM

## 2025-04-09 RX ORDER — DEXAMETHASONE SODIUM PHOSPHATE 4 MG/ML
INJECTION, SOLUTION INTRA-ARTICULAR; INTRALESIONAL; INTRAMUSCULAR; INTRAVENOUS; SOFT TISSUE
Status: DISCONTINUED
Start: 2025-04-09 | End: 2025-04-09 | Stop reason: HOSPADM

## 2025-04-09 RX ORDER — LIDOCAINE HYDROCHLORIDE 20 MG/ML
INJECTION, SOLUTION EPIDURAL; INFILTRATION; INTRACAUDAL; PERINEURAL
Status: DISCONTINUED | OUTPATIENT
Start: 2025-04-09 | End: 2025-04-09 | Stop reason: HOSPADM

## 2025-04-09 RX ORDER — FENTANYL CITRATE 50 UG/ML
INJECTION, SOLUTION INTRAMUSCULAR; INTRAVENOUS
Status: DISCONTINUED | OUTPATIENT
Start: 2025-04-09 | End: 2025-04-09

## 2025-04-09 RX ORDER — DEXAMETHASONE SODIUM PHOSPHATE 4 MG/ML
INJECTION, SOLUTION INTRA-ARTICULAR; INTRALESIONAL; INTRAMUSCULAR; INTRAVENOUS; SOFT TISSUE
Status: DISCONTINUED | OUTPATIENT
Start: 2025-04-09 | End: 2025-04-09 | Stop reason: HOSPADM

## 2025-04-09 RX ORDER — OXYCODONE AND ACETAMINOPHEN 5; 325 MG/1; MG/1
1 TABLET ORAL EVERY 6 HOURS PRN
Qty: 12 TABLET | Refills: 0 | Status: SHIPPED | OUTPATIENT
Start: 2025-04-09

## 2025-04-09 RX ORDER — LIDOCAINE HYDROCHLORIDE 20 MG/ML
INJECTION INTRAVENOUS
Status: DISCONTINUED | OUTPATIENT
Start: 2025-04-09 | End: 2025-04-09

## 2025-04-09 RX ORDER — SODIUM CHLORIDE 0.9 % (FLUSH) 0.9 %
3 SYRINGE (ML) INJECTION
Status: DISCONTINUED | OUTPATIENT
Start: 2025-04-09 | End: 2025-04-09 | Stop reason: HOSPADM

## 2025-04-09 RX ORDER — ATROPINE SULFATE 10 MG/ML
1 SOLUTION/ DROPS OPHTHALMIC
Status: DISCONTINUED | OUTPATIENT
Start: 2025-04-09 | End: 2025-04-09 | Stop reason: HOSPADM

## 2025-04-09 RX ORDER — VANCOMYCIN HYDROCHLORIDE 500 MG/10ML
INJECTION, POWDER, LYOPHILIZED, FOR SOLUTION INTRAVENOUS
Status: DISCONTINUED
Start: 2025-04-09 | End: 2025-04-09 | Stop reason: WASHOUT

## 2025-04-09 RX ADMIN — PHENYLEPHRINE HYDROCHLORIDE 1 DROP: 25 SOLUTION/ DROPS OPHTHALMIC at 11:04

## 2025-04-09 RX ADMIN — Medication: at 11:04

## 2025-04-09 RX ADMIN — SODIUM CHLORIDE: 9 INJECTION, SOLUTION INTRAVENOUS at 11:04

## 2025-04-09 RX ADMIN — MOXIFLOXACIN OPHTHALMIC 1 DROP: 5 SOLUTION/ DROPS OPHTHALMIC at 11:04

## 2025-04-09 RX ADMIN — TETRACAINE HYDROCHLORIDE 1 DROP: 5 SOLUTION OPHTHALMIC at 11:04

## 2025-04-09 RX ADMIN — ATROPINE SULFATE 1 DROP: 10 SOLUTION/ DROPS OPHTHALMIC at 11:04

## 2025-04-09 RX ADMIN — PREDNISOLONE ACETATE 1 DROP: 10 SUSPENSION/ DROPS OPHTHALMIC at 11:04

## 2025-04-09 RX ADMIN — MIDAZOLAM HYDROCHLORIDE 1 MG: 2 INJECTION, SOLUTION INTRAMUSCULAR; INTRAVENOUS at 12:04

## 2025-04-09 RX ADMIN — FENTANYL CITRATE 25 MCG: 50 INJECTION, SOLUTION INTRAMUSCULAR; INTRAVENOUS at 12:04

## 2025-04-09 RX ADMIN — LIDOCAINE HYDROCHLORIDE 40 MG: 20 INJECTION INTRAVENOUS at 11:04

## 2025-04-09 RX ADMIN — FENTANYL CITRATE 25 MCG: 50 INJECTION, SOLUTION INTRAMUSCULAR; INTRAVENOUS at 11:04

## 2025-04-09 RX ADMIN — MIDAZOLAM HYDROCHLORIDE 1 MG: 2 INJECTION, SOLUTION INTRAMUSCULAR; INTRAVENOUS at 11:04

## 2025-04-09 RX ADMIN — PROPOFOL 40 MG: 10 INJECTION, EMULSION INTRAVENOUS at 11:04

## 2025-04-09 NOTE — ANESTHESIA PREPROCEDURE EVALUATION
04/09/2025  [facility]  Anesthesia Pre-Operative Evaluation         Patient Name: Billy Rivera  YOB: 1965  MRN: [id number]    SUBJECTIVE:     Pre-operative evaluation for Procedure(s) (LRB):  VITRECTOMY, PARS PLANA APPROACH (Left)     04/09/2025    Billy Rivera is a 59 y.o. male w/ a pertinent PMHx of IDDM2, non obstructive CAD, BMI 35. Poorly controlled BG, 300 pre op.     Full medical history listed below      LDA: None documented.    Prev airway: None documented.     GTTs: None documented.        Problem List[1]    Review of patient's allergies indicates:   Allergen Reactions    Penicillins Other (See Comments)     PCN allergy as a child - was told he went into a coma. Tolerates Cefazolin without adverse reactions    Shellfish containing products      Other reaction(s): Unknown    Vancomycin Itching     Tolerated vancomycin 7/2020    Bactrim  [sulfamethoxazole-trimethoprim] Rash       Current Inpatient Medications:   bevacizumab        BUPivacaine (PF) 0.75% (7.5 mg/ml)        chondroitin-sodium hyaluronate        dexAMETHasone        EPINEPHrine (PF)        LIDOcaine (PF) 20 mg/mL (2%)        neomycin-polymyxin-dexamethasone           Medications Ordered Prior to Encounter[2]    Past Surgical History:   Procedure Laterality Date    COLONOSCOPY N/A 2/17/2017    Procedure: COLONOSCOPY;  Surgeon: Simon Gomez MD;  Location: [address]);  Service: Endoscopy;  Laterality: N/A;    CORONARY ANGIOPLASTY      EXCISION, TARSAL OR METATARSAL BONE, EXCLUDING TALUS OR CALCANEUS, PARTIAL Right 12/6/2024    Procedure: EXCISION,TARSAL OR METATARSAL BONE,EXCLUDING TALUS OR CALCANEUS,PARTIAL;  Surgeon: Mingo Melara DPM;  Location: [facility];  Service: Podiatry;  Laterality: Right;    INCISION AND DRAINAGE FOOT Right 6/5/2018    Procedure: INCISION AND DRAINAGE,  FOOT;  Surgeon: Bridget Santana DPM;  Location: [facility] OR 1ST FLR;  Service: Podiatry;  Laterality: Right;  Request mini C arm in room. request wound vac with medium black sponge    INCISION AND DRAINAGE FOOT Right 6/7/2018    Procedure: INCISION AND DRAINAGE, FOOT;  Surgeon: Emelia Brock DPM;  Location: [facility] OR 2ND FLR;  Service: Podiatry;  Laterality: Right;    INCISION AND DRAINAGE FOOT Left 9/4/2020    Procedure: INCISION AND DRAINAGE, FOOT;  Surgeon: Ainsley Powell DPM;  Location: [facility] OR 2ND FLR;  Service: Podiatry;  Laterality: Left;    INCISION AND DRAINAGE FOOT Left 12/22/2020    Procedure: INCISION AND DRAINAGE, FOOT;  Surgeon: Ainsley Powell DPM;  Location: [facility] OR 2ND FLR;  Service: Podiatry;  Laterality: Left;  May need micro choice  Need Jam shidi needles  Stretcher OK      INCISION AND DRAINAGE OF ABSCESS Right 6/2/2018    Procedure: INCISION AND DRAINAGE, ABSCESS;  Surgeon: Bridget Santana DPM;  [facility];  Service: General;  Laterality: Right;    OSTEOTOMY OF METATARSAL BONE  9/4/2020    Procedure: OSTEOTOMY, METATARSAL BONE, 1st METATARSAL RESECTION;  Surgeon: Ainsley Powell DPM;  Location: [facility] OR 2ND FLR;  Service: Podiatry;;    REMOVAL OF FOREIGN BODY FROM FOOT Right 6/7/2018    Procedure: REMOVAL, FOREIGN BODY, FOOT;  Surgeon: Emelia Brock DPM;  Location: [facility] OR 2ND FLR;  Service: Podiatry;  Laterality: Right;    TOE AMPUTATION Left 7/4/2020    Procedure: AMPUTATION, TOE;  Surgeon: Bridget Santana DPM;  Location: [facility] OR 2ND FLR;  Service: Podiatry;  Laterality: Left;    TOE AMPUTATION Left 10/14/2020    Procedure: AMPUTATION, TOE;  Surgeon: Mingo Melara DPM;  Location: [facility] OR 2ND FLR;  Service: Podiatry;  Laterality: Left;  stretcher OK    TOE AMPUTATION Right 6/9/2022    Procedure: AMPUTATION, TOE - Dr. Melara @ [place] in am;  Surgeon: Mingo Melara DPM;  Location: [facility] OR 1ST FLR;  Service: Podiatry;  Laterality: Right;    TOE AMPUTATION Right  "11/23/2022    Procedure: AMPUTATION, TOE;  Surgeon: Hansa Robertson DPM;  [facility];  Service: Podiatry;  Laterality: Right;    TOE AMPUTATION Right 11/25/2022    Procedure: AMPUTATION, TOE;  Surgeon: Chris Groves DPM;  [facility];  Service: Podiatry;  Laterality: Right;    TONSILLECTOMY         Social History[3]    OBJECTIVE:     Vital Signs Range (Last 24H):  Temp:  [36.4 °C (97.6 °F)]   Pulse:  [60]   Resp:  [18]   BP: (163)/(92)   SpO2:  [97 %]       CBC:   No results for input(s): "WBC", "RBC", "HGB", "HCT", "PLT", "MCV", "MCH", "MCHC" in the last 72 hours.    CMP: No results for input(s): "NA", "K", "CL", "CO2", "BUN", "CREATININE", "GLU", "MG", "PHOS", "CALCIUM", "ALBUMIN", "PROT", "ALKPHOS", "ALT", "AST", "BILITOT" in the last 72 hours.    INR:  No results for input(s): "PT", "INR", "PROTIME", "APTT" in the last 72 hours.    Diagnostic Studies: No relevant studies.    EKG:   Results for orders placed or performed during the hospital encounter of 12/12/24   EKG 12-lead    Collection Time: 12/12/24  3:58 PM   Result Value Ref Range    QRS Duration 86 ms    [facility] QTC Calculation 415 ms    Narrative    Test Reason : R55,    Vent. Rate :  91 BPM     Atrial Rate :  91 BPM     P-R Int : 170 ms          QRS Dur :  86 ms      QT Int : 338 ms       P-R-T Axes :  28 -52  47 degrees    QTcB Int : 415 ms    Normal sinus rhythm  Left axis deviation  Abnormal R wave progression in the precordial leads - Cannot rule out  Anterior infarct ,age undetermined vs lead placement  Abnormal ECG  When compared with ECG of 27-Jul-2023 08:02,  Vent. rate has increased by  38 bpm  Confirmed by Andres Holt ([id number]) on 12/13/2024 8:04:28 AM    Referred By: EMERY SELF           Confirmed By: Andres Holt        2D ECHO:   Results for orders placed during the hospital encounter of 12/12/24    Echo    Interpretation Summary    Left Ventricle: The left ventricle is normal in size. Ventricular mass is " normal. Mildly increased wall thickness. Mild septal thickening. There is concentric remodeling. There is normal systolic function with a visually estimated ejection fraction of 60 - 65%. Ejection fraction is approximately 65%. There is normal diastolic function.    Right Ventricle: Normal right ventricular cavity size. Wall thickness is normal. Systolic function is normal.    Aortic Valve: There is mild aortic valve sclerosis.    IVC/SVC: Normal venous pressure at 3 mmHg.         ASSESSMENT/PLAN:           Pre-op Assessment    I have reviewed the Patient Summary Reports.     I have reviewed the Nursing Notes. I have reviewed the NPO Status.   I have reviewed the Medications.     Review of Systems  Anesthesia Hx:   History of prior surgery of interest to airway management or planning:          Denies Family Hx of Anesthesia complications.    Denies Personal Hx of Anesthesia complications.                        Physical Exam  General: Well nourished, Cooperative, Alert and Oriented    Airway:  Mallampati: II   Mouth Opening: Normal  TM Distance: Normal  Tongue: Normal  Neck ROM: Normal ROM    Dental:  Intact, Periodontal disease    Chest/Lungs:  Clear to auscultation, Normal Respiratory Rate    Heart:  Rate: Normal  Rhythm: Regular Rhythm        Anesthesia Plan  Type of Anesthesia, risks & benefits discussed:    Anesthesia Type: Gen Natural Airway, MAC  Intra-op Monitoring Plan: Standard ASA Monitors  Post Op Pain Control Plan: multimodal analgesia and IV/PO Opioids PRN  Induction:  IV  Informed Consent: Informed consent signed with the Patient and all parties understand the risks and agree with anesthesia plan.  All questions answered.   ASA Score: 3  Day of Surgery Review of History & Physical: H&P Update referred to the surgeon/provider.    Ready For Surgery From Anesthesia Perspective.     .           [1]   Patient Active Problem List  Diagnosis    Essential hypertension    Coronary artery disease involving  native coronary artery of native heart without angina pectoris    BDR (background diabetic retinopathy) - Both Eyes    Type 2 diabetes mellitus with hyperglycemia, with long-term current use of insulin    Proteinuria    Edema    Hypertension associated with diabetes    Type 2 diabetes mellitus with diabetic peripheral angiopathy without gangrene, with long-term current use of insulin    Onychomycosis of multiple toenails with type 2 diabetes mellitus    Soft tissue infection    Hyponatremia    Anemia    Hypomagnesemia    Diabetic foot ulcer associated with type 2 diabetes mellitus    Insomnia    Hx of colon cancer, stage I    Dyslipidemia associated with type 2 diabetes mellitus    Diabetes mellitus with insulin therapy    Obesity, diabetes, and hypertension syndrome    Class 2 severe obesity with body mass index (BMI) of 35 to 39.9 with serious comorbidity    Diabetes mellitus with microalbuminuria    Diabetes mellitus with peripheral circulatory disorder    Pyogenic inflammation of bone    Diabetic foot infection    Diabetic foot ulcer associated with diabetes mellitus due to underlying condition    Amputation of toe of left foot    Subacute osteomyelitis of right foot    CAD (coronary artery disease)    Ulcer of right foot with fat layer exposed    Right foot pain    Ulcer of toe, right, with necrosis of bone    Acquired absence of other right toe(s)    Other chronic osteomyelitis, right ankle and foot    Obesity    JOVANNI (acute kidney injury)    Bacterial infection due to Staphylococcus    Uses self-applied continuous glucose monitoring device    Type 2 diabetes mellitus with both eyes affected by proliferative retinopathy and macular edema, with long-term current use of insulin    Vitreous hemorrhage, bilateral   [2]   No current facility-administered medications on file prior to encounter.     Current Outpatient Medications on File Prior to Encounter   Medication Sig Dispense Refill    aspirin (ECOTRIN) 81 MG EC  "tablet Take 1 tablet (81 mg total) by mouth once daily. 90 tablet 3    atorvastatin (LIPITOR) 80 MG tablet Take 1 tablet (80 mg total) by mouth every evening. (Patient taking differently: Take 80 mg by mouth once daily.) 30 tablet 6    bismuth subsalicylate (PEPTO BISMOL) 262 mg/15 mL suspension Take 15 mLs by mouth daily as needed (diarrhea).      blood sugar diagnostic (ACCU-CHEK GUIDE TEST STRIPS) Strp 1 each by Misc.(Non-Drug; Combo Route) route 5 (five) times daily. 150 each 11    blood-glucose sensor (DEXCOM G7 SENSOR) Haley 1 Device by Misc.(Non-Drug; Combo Route) route every 10 days. 3 each 11    carvediloL (COREG) 12.5 MG tablet Take 0.5 tablets (6.25 mg total) by mouth 2 (two) times daily with meals. 90 tablet 3    doxycycline (VIBRAMYCIN) 100 MG Cap Take 1 capsule by mouth twice daily 20 capsule 0    insulin aspart U-100 (NOVOLOG FLEXPEN U-100 INSULIN) 100 unit/mL (3 mL) InPn pen Inject 10 Units into the skin 3 (three) times daily with meals. 15 mL 2    insulin glargine U-100, Lantus, (LANTUS SOLOSTAR U-100 INSULIN) 100 unit/mL (3 mL) InPn pen Inject 20 Units into the skin every evening. 15 mL 2    lancets (ACCU-CHEK FASTCLIX LANCET DRUM) Misc 1 each by Misc.(Non-Drug; Combo Route) route Daily. 30 each 11    metFORMIN (GLUCOPHAGE) 1000 MG tablet Take 1 tablet (1,000 mg total) by mouth 2 (two) times daily with meals. 180 tablet 3    multivit-min/folic/vit K/lycop (MEN'S MULTIVITAMIN ORAL) Take 1 tablet by mouth once daily.      pen needle, diabetic 31 gauge x 3/16" Ndle Inject 1 each into the skin 4 (four) times daily. 100 each 11    acetaminophen (TYLENOL) 500 MG tablet Take 1,000 mg by mouth daily as needed for Pain.      oxyCODONE-acetaminophen (PERCOCET) 7.5-325 mg per tablet Take 1 tablet by mouth every 6 (six) hours as needed.      semaglutide (OZEMPIC) 0.25 mg or 0.5 mg (2 mg/3 mL) pen injector Inject 0.25 mg into the skin every 7 days. (Patient not taking: Reported on 4/7/2025) 3 mL 1   [3]   Social " History  Socioeconomic History    Marital status:    Tobacco Use    Smoking status: Never     Passive exposure: Never    Smokeless tobacco: Never   Substance and Sexual Activity    Alcohol use: Yes     Comment: rare x1 beer-     Drug use: No    Sexual activity: Not Currently     Social Drivers of Health     Financial Resource Strain: Low Risk  (12/13/2024)    Overall Financial Resource Strain (CARDIA)     Difficulty of Paying Living Expenses: Not hard at all   Food Insecurity: No Food Insecurity (12/13/2024)    Hunger Vital Sign     Worried About Running Out of Food in the Last Year: Never true     Ran Out of Food in the Last Year: Never true   Transportation Needs: No Transportation Needs (12/13/2024)    TRANSPORTATION NEEDS     Transportation : No   Physical Activity: Inactive (12/13/2024)    Exercise Vital Sign     Days of Exercise per Week: 0 days     Minutes of Exercise per Session: 0 min   Stress: No Stress Concern Present (12/13/2024)    [employer] [place] [employer] - Occupational Stress Questionnaire     Feeling of Stress : Only a little   Housing Stability: Unknown (12/13/2024)    Housing Stability Vital Sign     Unable to Pay for Housing in the Last Year: No     Homeless in the Last Year: No

## 2025-04-09 NOTE — OP NOTE
Preoperative diagnosis: NCVH from PDR T2DM OS    Post op Dx: same with peripheral localized tractional retinal detachments OS    Procedure performed:  25g PPV/membrane stripping/EL/AFx/Avastin 1.25mg/0.05mL OS    Attending surgeons:  JHONNY Garvey    Anesthesia:  Local MAC with retrobulbar injection 4.0cc mixture of 2% lidocaine, 0.75% marcaine    Estimated blood loss: minimal    Complications:  None    DOS: 4/9/25    Disposition: stable to recovery then home    Indications for surgery:   This is a 58yo patient who presented with bilateral vitreous hemorrhages.  He did not significantly improve with Avastin OU x 1 - and given the need to function and return to work, decision was made to take the patient to surgery to remove the vitreous hemorrhage, prevent further vision loss and hopefully improved some vision.  Risks, benefits, and alternatives to surgery were discussed in detail. Risks including loss of vision, loss of eye, retina detachment, hemorrhage, infection, lens dislocation, glaucoma, hypotony, ptosis, diplopia    Description of procedure:  After proper informed consent was obtained, the patient was brought back to the operating room at [facility] where monitored anesthesia care was induced.  A retrobulbar injection was provided above without complication.  The patient is prepped draped in normal sterile fashion for ophthalmic surgery and a lid speculum was placed in the left eye.  A standard 3 port 25 gauge pars plana vitrectomy setup with the infusion cannula inserted 4.0 mm posterior to the limbus.  The infusion cannula was turned on after it was observed free and clear of all underlying tissue.  Super nasal and superotemporal trocars were also placed  4.0 mm posterior to the limbus.  The vitrector and light pipe were introduced in the vitreous cavity and a core vitrectomy was performed.   A 360 degree cortical vitrectomy was performed.   There were a few localized tractional  detachments nasally and inferiorly a/w partially regressed NV tuft with vitreous traction. Endolaser was applied in a PRP fashion and barrier fashion around the the localized TRD's/NV tahir.  Avastin was injected throught the ST trocar. The trocars removed and not leaking after gentle massage.  The eye was normal pressure via palpation.  Subconjunctival injections of vancomycin and Decadron were given and  the patient's the drapes removed. the patient was washed free of Betadine prep solution,  ointment was placed in the eye then patched shielded, mac anesthesia was reversed and he was brought to recovery in stable condition tolerating the procedure well.  Dr. Garvey was present for in entire case

## 2025-04-09 NOTE — TRANSFER OF CARE
Anesthesia Transfer of Care Note    Patient: Billy Yatesn    Procedure(s) Performed: Procedure(s) (LRB):  REPAIR,RETINAL DETACHMENT,COMPLEX,WITH VITRECTOMY AND MEMBRANE PEELING (Left)    Anesthesia Type: MAC    Transport from OR: Transported from OR on room air with adequate spontaneous ventilation    Post pain: adequate analgesia    Post assessment: no apparent anesthetic complications and tolerated procedure well    Post vital signs: stable    Level of consciousness: awake    Nausea/Vomiting: no nausea/vomiting    Complications: none    Transfer of care protocol was followed      Last vitals: Visit Vitals  BP (!) 163/92 (BP Location: Left arm)   Pulse 60   Temp 36.4 °C (97.6 °F) (Temporal)   Resp 18   SpO2 97%

## 2025-04-09 NOTE — BRIEF OP NOTE
Preoperative diagnosis: NCVH from PDR T2DM OS    Post op Dx: same with peripheral localized tractional retinal detachments OS    Procedure performed:  25g PPV/membrane stripping/EL/AFx/Avastin 1.25mg/0.05mL OS    Attending Surgeon: Mare    Assistant Surgeon: Amaya    Anesthesia: Local/Mac, retrobulbar injection of 4.0cc mixture 0.75%Marcaine, 2% Xylocaine    Estimated blood loss: Minimal    Complication: None    Specimen: None    Disposition: Stable to recovery    Findings/Outcome: NCVH with localized tractional detachments OS    Date of Discharge: 4/9/25    Discharge Disposition: stable to recovery then home  F/U: tomorrow

## 2025-04-09 NOTE — ANESTHESIA POSTPROCEDURE EVALUATION
Anesthesia Post Evaluation    Patient: Billy Rivera    Procedure(s) Performed: Procedure(s) (LRB):  REPAIR,RETINAL DETACHMENT,COMPLEX,WITH VITRECTOMY AND MEMBRANE PEELING (Left)    Final Anesthesia Type: general      Patient location during evaluation: PACU  Patient participation: Yes- Able to Participate  Level of consciousness: awake and alert and oriented  Post-procedure vital signs: reviewed and stable  Pain management: adequate  Airway patency: patent    PONV status at discharge: No PONV  Anesthetic complications: no      Cardiovascular status: blood pressure returned to baseline and hemodynamically stable  Respiratory status: unassisted  Hydration status: euvolemic  Follow-up not needed.              Vitals Value Taken Time   /82 04/09/25 13:15   Temp 36.7 °C (98.1 °F) 04/09/25 12:44   Pulse 68 04/09/25 13:15   Resp 23 04/09/25 13:15   SpO2 98 % 04/09/25 13:15         No case tracking events are documented in the log.      Pain/Gabriel Score: Gabriel Score: 10 (4/9/2025  1:00 PM)

## 2025-04-09 NOTE — DISCHARGE SUMMARY
[facility] - Surgery (1st Fl)  Discharge Note  Short Stay    Procedure(s) (LRB):  VITRECTOMY, PARS PLANA APPROACH (Left)      OUTCOME: Patient tolerated treatment/procedure well without complication and is now ready for discharge.    DISPOSITION: Home or Self Care    FINAL DIAGNOSIS:  NCVH with localized tractional detachments OS    FOLLOWUP: In clinic    DISCHARGE INSTRUCTIONS:  No discharge procedures on file.     TIME SPENT ON DISCHARGE:    minutes

## 2025-04-10 ENCOUNTER — TELEPHONE (OUTPATIENT)
Dept: INTERNAL MEDICINE | Facility: CLINIC | Age: 60
End: 2025-04-10
Payer: COMMERCIAL

## 2025-04-10 ENCOUNTER — OFFICE VISIT (OUTPATIENT)
Dept: OPHTHALMOLOGY | Facility: CLINIC | Age: 60
End: 2025-04-10
Payer: COMMERCIAL

## 2025-04-10 DIAGNOSIS — Z79.4 TYPE 2 DIABETES MELLITUS WITH LEFT EYE AFFECTED BY PROLIFERATIVE RETINOPATHY AND TRACTION RETINAL DETACHMENT NOT INVOLVING MACULA, WITH LONG-TERM CURRENT USE OF INSULIN: Primary | ICD-10-CM

## 2025-04-10 DIAGNOSIS — E11.65 TYPE 2 DIABETES MELLITUS WITH HYPERGLYCEMIA, WITH LONG-TERM CURRENT USE OF INSULIN: ICD-10-CM

## 2025-04-10 DIAGNOSIS — Z79.4 TYPE 2 DIABETES MELLITUS WITH BOTH EYES AFFECTED BY PROLIFERATIVE RETINOPATHY AND MACULAR EDEMA, WITH LONG-TERM CURRENT USE OF INSULIN: ICD-10-CM

## 2025-04-10 DIAGNOSIS — E11.3532 TYPE 2 DIABETES MELLITUS WITH LEFT EYE AFFECTED BY PROLIFERATIVE RETINOPATHY AND TRACTION RETINAL DETACHMENT NOT INVOLVING MACULA, WITH LONG-TERM CURRENT USE OF INSULIN: Primary | ICD-10-CM

## 2025-04-10 DIAGNOSIS — Z79.4 TYPE 2 DIABETES MELLITUS WITH HYPERGLYCEMIA, WITH LONG-TERM CURRENT USE OF INSULIN: ICD-10-CM

## 2025-04-10 DIAGNOSIS — E11.3513 TYPE 2 DIABETES MELLITUS WITH BOTH EYES AFFECTED BY PROLIFERATIVE RETINOPATHY AND MACULAR EDEMA, WITH LONG-TERM CURRENT USE OF INSULIN: ICD-10-CM

## 2025-04-10 PROCEDURE — 1160F RVW MEDS BY RX/DR IN RCRD: CPT | Mod: CPTII,S$GLB,, | Performed by: OPHTHALMOLOGY

## 2025-04-10 PROCEDURE — 1159F MED LIST DOCD IN RCRD: CPT | Mod: CPTII,S$GLB,, | Performed by: OPHTHALMOLOGY

## 2025-04-10 PROCEDURE — 99999 PR PBB SHADOW E&M-EST. PATIENT-LVL III: CPT | Mod: PBBFAC,,, | Performed by: OPHTHALMOLOGY

## 2025-04-10 PROCEDURE — 99024 POSTOP FOLLOW-UP VISIT: CPT | Mod: S$GLB,,, | Performed by: OPHTHALMOLOGY

## 2025-04-10 RX ORDER — METFORMIN HYDROCHLORIDE 1000 MG/1
1000 TABLET ORAL 2 TIMES DAILY WITH MEALS
Qty: 180 TABLET | Refills: 0 | Status: SHIPPED | OUTPATIENT
Start: 2025-04-10

## 2025-04-10 RX ORDER — CARVEDILOL 12.5 MG/1
TABLET ORAL
Qty: 90 TABLET | Refills: 1 | Status: SHIPPED | OUTPATIENT
Start: 2025-04-10

## 2025-04-10 NOTE — TELEPHONE ENCOUNTER
Care Due:                  Date            Visit Type   Department     Provider  --------------------------------------------------------------------------------                                [facility] INTERNAL  Last Visit: 01-      FOLLOW UP    MEDICINE       Augie Ruiz  Next Visit: None Scheduled  None         None Found                                                            Last  Test          Frequency    Reason                     Performed    Due Date  --------------------------------------------------------------------------------    HBA1C.......  6 months...  metFORMIN................  11- 05-    [facility] Embedded Care Due Messages. Reference number: [id number].   4/10/2025 9:55:30 AM CDT

## 2025-04-10 NOTE — TELEPHONE ENCOUNTER
Refill Routing Note   Medication(s) are not appropriate for processing by [facility] for the following reason(s):        Required vitals abnormal    [facility] action(s):  Defer  Approve   Requires labs : Yes - A1c            Appointments  past 12m or future 3m with PCP    Date Provider   Last Visit   1/2/2025 Augie Ruiz MD   Next Visit   Visit date not found Augie Ruiz MD   ED visits in past 90 days: 0        Note composed:10:29 AM 04/10/2025

## 2025-04-10 NOTE — PROGRESS NOTES
HPI     Post-op Evaluation     Additional comments: 1 day s/p PPV OS           Comments    Some mild discomfort, no pain ou and denies floaters or flashes. Post op   instructions given.          Last edited by Mandy Rivera on 4/10/2025  8:27 AM.          OCT - OD preretinal heme - improving  OS  some clearance but still sig central hemorrhage      A/P    PDR OU  T2 uncontrolled with insulin    2. VH OU  Preretinal heme OD  OS with diffuse VH  S/p Avastin OU x 2 last 4/1/25    Avastin OU today    Will need PRP OD - as heme continuing to improve      S/p 25g PPV/EL/partial AFx/Avastin OS 4/9/25    Doing well, good IOP  Has small localized inferior and nasal TRD/traction tahir - good PRP and surrounding laser    Start gtts QID ointment/shield QHS        3. HTN Ret OU  BS/BP/chol control    4. NS OU      1 week

## 2025-04-15 ENCOUNTER — OFFICE VISIT (OUTPATIENT)
Dept: PODIATRY | Facility: CLINIC | Age: 60
End: 2025-04-15
Payer: COMMERCIAL

## 2025-04-15 ENCOUNTER — OFFICE VISIT (OUTPATIENT)
Dept: OPHTHALMOLOGY | Facility: CLINIC | Age: 60
End: 2025-04-15
Payer: COMMERCIAL

## 2025-04-15 VITALS
TEMPERATURE: 98 F | RESPIRATION RATE: 18 BRPM | BODY MASS INDEX: 35.94 KG/M2 | DIASTOLIC BLOOD PRESSURE: 82 MMHG | HEIGHT: 74 IN | SYSTOLIC BLOOD PRESSURE: 149 MMHG | WEIGHT: 280 LBS | HEART RATE: 61 BPM

## 2025-04-15 DIAGNOSIS — Z89.412 LEFT GREAT TOE AMPUTEE: ICD-10-CM

## 2025-04-15 DIAGNOSIS — L97.522 FOOT ULCER, LEFT, WITH FAT LAYER EXPOSED: Primary | ICD-10-CM

## 2025-04-15 DIAGNOSIS — E11.3532 TYPE 2 DIABETES MELLITUS WITH LEFT EYE AFFECTED BY PROLIFERATIVE RETINOPATHY AND TRACTION RETINAL DETACHMENT NOT INVOLVING MACULA, WITH LONG-TERM CURRENT USE OF INSULIN: Primary | ICD-10-CM

## 2025-04-15 DIAGNOSIS — Z79.4 TYPE 2 DIABETES MELLITUS WITH LEFT EYE AFFECTED BY PROLIFERATIVE RETINOPATHY AND TRACTION RETINAL DETACHMENT NOT INVOLVING MACULA, WITH LONG-TERM CURRENT USE OF INSULIN: Primary | ICD-10-CM

## 2025-04-15 DIAGNOSIS — E11.49 TYPE II DIABETES MELLITUS WITH NEUROLOGICAL MANIFESTATIONS: ICD-10-CM

## 2025-04-15 DIAGNOSIS — H43.13 VITREOUS HEMORRHAGE, BILATERAL: ICD-10-CM

## 2025-04-15 PROCEDURE — 99999 PR PBB SHADOW E&M-EST. PATIENT-LVL III: CPT | Mod: PBBFAC,,, | Performed by: OPHTHALMOLOGY

## 2025-04-15 PROCEDURE — 3077F SYST BP >= 140 MM HG: CPT | Mod: CPTII,S$GLB,, | Performed by: PODIATRIST

## 2025-04-15 PROCEDURE — 3008F BODY MASS INDEX DOCD: CPT | Mod: CPTII,S$GLB,, | Performed by: PODIATRIST

## 2025-04-15 PROCEDURE — 1159F MED LIST DOCD IN RCRD: CPT | Mod: CPTII,S$GLB,, | Performed by: PODIATRIST

## 2025-04-15 PROCEDURE — 99213 OFFICE O/P EST LOW 20 MIN: CPT | Mod: 25,S$GLB,, | Performed by: PODIATRIST

## 2025-04-15 PROCEDURE — 99999 PR PBB SHADOW E&M-EST. PATIENT-LVL V: CPT | Mod: PBBFAC,,, | Performed by: PODIATRIST

## 2025-04-15 PROCEDURE — 99024 POSTOP FOLLOW-UP VISIT: CPT | Mod: S$GLB,,, | Performed by: OPHTHALMOLOGY

## 2025-04-15 PROCEDURE — 1160F RVW MEDS BY RX/DR IN RCRD: CPT | Mod: CPTII,S$GLB,, | Performed by: OPHTHALMOLOGY

## 2025-04-15 PROCEDURE — 1159F MED LIST DOCD IN RCRD: CPT | Mod: CPTII,S$GLB,, | Performed by: OPHTHALMOLOGY

## 2025-04-15 PROCEDURE — 3079F DIAST BP 80-89 MM HG: CPT | Mod: CPTII,S$GLB,, | Performed by: PODIATRIST

## 2025-04-15 PROCEDURE — 11042 DBRDMT SUBQ TIS 1ST 20SQCM/<: CPT | Mod: S$GLB,,, | Performed by: PODIATRIST

## 2025-04-15 PROCEDURE — 1160F RVW MEDS BY RX/DR IN RCRD: CPT | Mod: CPTII,S$GLB,, | Performed by: PODIATRIST

## 2025-04-15 NOTE — PROGRESS NOTES
HPI     1 week post op    os        Additional comments: 1 week post op    os   S/p ppv  EL/partial  Afx  04/09/25 os   Prior  injection     VH   Pt  states bubble getting smaller in os  this week   NS   Gtts    PF  qid  os   Vigamox  qid  os   Moxi  qid  os   Fran   gale/poly/dex   qhs  os   DLS  04/10/25            Last edited by Obdulia Rangel on 4/15/2025 10:51 AM.               OCT - OD preretinal heme - improving  OS  some clearance but still sig central hemorrhage      A/P    PDR OU  T2 uncontrolled with insulin    2. VH OU  Preretinal heme OD  OS with diffuse VH  S/p Avastin OU x 2 last 4/1/25    Will need PRP OD - as heme continuing to improve      S/p 25g PPV/EL/partial AFx/Avastin OS 4/9/25    Doing well, good IOP  Has small localized inferior and nasal TRD/traction tahir - good PRP and surrounding laser    Taper PF 2/1/0        3. HTN Ret OU  BS/BP/chol control    4. NS OU      1 month OCT and dilate OU

## 2025-04-19 NOTE — PROGRESS NOTES
Subjective:     Patient ID: Billy Rivera is a 59 y.o. male.    Chief Complaint: Wound Care (Left foot ulcer ) and Foot Problem (Drainage )    Billy is a 59 y.o. male who presents to the clinic for evaluation and treatment of high risk feet. Billy has a past medical history of Allergy, CAD (coronary artery disease), native coronary artery (06/25/2013), Cancer, COVID-19 virus detected (09/01/2020), Diabetes mellitus, Diabetes mellitus, type 2, Disorder of kidney and ureter, Heart attack (04/2012), colon cancer, stage I, Hyperlipidemia, Hypertension, Muscular pain, NSTEMI May 2013 - peak troponin 0.22 (05/22/2013), MICHAELA (obstructive sleep apnea), Retinopathy due to secondary diabetes, and Uses self-applied continuous glucose monitoring device (01/03/2025). The patient's chief complaint is diabetic foot ulcer, r. This patient has documented high risk feet requiring routine maintenance secondary to diabetes mellitis and those secondary complications of diabetes, as mentioned..  Improving/no new complaints today.  Right foot has healed since previous metatarsal head resection.  Ongoing issues with left 1st ray stump status post partial ray resection several years ago.  Ongoing ulceration with on and off infections.  PCP: Augie Ruiz MD    Date Last Seen by PCP:   Chief Complaint   Patient presents with    Wound Care     Left foot ulcer     Foot Problem     Drainage      Hemoglobin A1C   Date Value Ref Range Status   11/27/2024 10.2 (H) 4.0 - 5.6 % Final     Comment:     ADA Screening Guidelines:  5.7-6.4%  Consistent with prediabetes  >or=6.5%  Consistent with diabetes    High levels of fetal hemoglobin interfere with the HbA1C  assay. Heterozygous hemoglobin variants (HbS, HgC, etc)do  not significantly interfere with this assay.   However, presence of multiple variants may affect accuracy.     07/20/2023 8.7 (H) 4.0 - 5.6 % Final     Comment:     ADA Screening Guidelines:  5.7-6.4%  Consistent with  "prediabetes  >or=6.5%  Consistent with diabetes    High levels of fetal hemoglobin interfere with the HbA1C  assay. Heterozygous hemoglobin variants (HbS, HgC, etc)do  not significantly interfere with this assay.   However, presence of multiple variants may affect accuracy.     04/06/2023 9.4 (H) 4.0 - 5.6 % Final     Comment:     ADA Screening Guidelines:  5.7-6.4%  Consistent with prediabetes  >or=6.5%  Consistent with diabetes    High levels of fetal hemoglobin interfere with the HbA1C  assay. Heterozygous hemoglobin variants (HbS, HgC, etc)do  not significantly interfere with this assay.   However, presence of multiple variants may affect accuracy.         Review of Systems   Constitutional: Negative for chills, decreased appetite and fever.   Cardiovascular:  Negative for leg swelling.   Skin:  Positive for dry skin, nail changes and poor wound healing.   Musculoskeletal:  Negative for arthritis, joint pain, joint swelling and myalgias.   Gastrointestinal:  Negative for nausea and vomiting.   Neurological:  Positive for numbness, paresthesias and sensory change. Negative for loss of balance.        Objective:     Vitals:    04/15/25 1134   BP: (!) 149/82   Pulse: 61   Resp: 18   Temp: 98.3 °F (36.8 °C)   TempSrc: Oral   Weight: 127 kg (279 lb 15.8 oz)   Height: 6' 2" (1.88 m)   PainSc: 0-No pain        Physical Exam  Constitutional:       Appearance: He is well-developed.   Cardiovascular:      Comments: Dorsalis pedis and posterior tibial pulses are diminished Bilaterally. Toes are cool to touch. Feet are warm proximally.There is decreased digital hair. Skin is atrophic, slightly hyperpigmented, and mildly edematous      Musculoskeletal:         General: Swelling and deformity present. No tenderness. Normal range of motion.      Comments: Adequate joint range of motion without pain, limitation, nor crepitation Bilateral feet and ankle joints. Muscle strength is 5/5 in all groups bilaterally.         Skin:     " "General: Skin is warm and dry.      Findings: No ecchymosis, erythema or lesion.   Neurological:      Mental Status: He is alert and oriented to person, place, and time.      Comments: [place]-Roge 5.07 monofilamant testing is diminished Vinicio feet. Sharp/dull sensation diminished Bilaterally. Light touch absent Bilaterally.       Psychiatric:         Behavior: Behavior normal.       Wounds healed right foot,     Full-thickness 1st brace.  Ulceration  to the left partial 1st ray site measuring 0.5 cm x 0.5 cm x 0.2 cm/subcutaneous tissue, periwound hyperkeratosis.       Assessment:      Encounter Diagnoses   Name Primary?    Foot ulcer, left, with fat layer exposed Yes    Left great toe amputee     Type II diabetes mellitus with neurological manifestations        Plan:     Billy was seen today for wound care and foot problem.    Diagnoses and all orders for this visit:    Foot ulcer, left, with fat layer exposed    Left great toe amputee    Type II diabetes mellitus with neurological manifestations        I counseled the patient on his conditions, their implications and medical management.    Independent review of patients previous clinical notes    Decision making:  Chronic illnesses discussed in detail, previous records/notes and imaging independently reviewed, prescription drug management performed in addition to lengthy discussion regarding both conservative and surgical treatment options.      Wound Debridement    Performed by: iMngo Melara DPM   Authorized by: Patient    Time out: Immediately prior to procedure a "time out" was called to verify the correct patient, procedure, equipment, support staff and site/side marked as required.   Consent Done?:  Yes (Verbal)  Local anesthesia used?: No       Wound Details:    Location:  Left foot     Type of Debridement:  Excisional       Length (cm):  0.5       Width (cm):   0.5       Depth (cm):  0.1       Percent Debrided (%):  100             Depth of " debridement:  Subcutaneous tissue    Tissue debrided:  Dermis, Epidermis and Subcutaneous    Devitalized tissue debrided:  Biofilm, Callus and Necrotic/Eschar    Instruments:  Blade, Curette and Nippers     Bleeding:  Minimal  Hemostasis Achieved: Yes    Method Used:  Pressure  Patient tolerance:  Patient tolerated the procedure well with no immediate complications      The nature of the condition, options for management, as well as potential risks and complications were discussed in detail with patient. Patient was amenable to my recommendations and left my office fully informed and will follow up as instructed or sooner if necessary.      The patient has decided to forego any further conservative treatment and opted for surgical intervention.  Alternative treatments, and benefits of surgery were discussed with the patient.  Risks and complications, including but not limited to: pain, swelling, numbness, infection, failure to achieve the stated goals, recurrence of problem, nerve and blood vessel damage, scar tissue formation, further need of surgery, loss of limb or life, was discussed in detail with the patient.  All questions asked were answered, and written informed consent was sign and received. The patient will undergo revision left foot/1st metatarsal partial ray resection.    In the meantime wound care discussed in detail/football dressing applied/follow-up in 1 week.

## 2025-04-24 ENCOUNTER — TELEPHONE (OUTPATIENT)
Dept: PHARMACY | Facility: CLINIC | Age: 60
End: 2025-04-24
Payer: COMMERCIAL

## 2025-04-24 NOTE — TELEPHONE ENCOUNTER
[facility]/Population Health Chart Review & Patient Outreach Details For Medication Adherence Project    Reason for Outreach Encounter: 3rd Party payor non-compliance report ([employer], [employer], C, etc)  2.  Patient Outreach Method: Reviewed Patient Chart  3.   Medication in question: lisinopril   LAST FILLED: n/a  Hypertension Medications              carvediloL (COREG) 12.5 MG tablet TAKE 1/2 (ONE-HALF) TABLET BY MOUTH TWICE DAILY WITH MEALS              4.  Reviewed and or Updates Made To: Patient Chart  5. Outreach Outcomes and/or actions taken: Medication discontinued    Additional Notes:

## 2025-04-28 ENCOUNTER — TELEPHONE (OUTPATIENT)
Dept: PODIATRY | Facility: CLINIC | Age: 60
End: 2025-04-28
Payer: COMMERCIAL

## 2025-04-28 ENCOUNTER — OFFICE VISIT (OUTPATIENT)
Dept: PODIATRY | Facility: CLINIC | Age: 60
End: 2025-04-28
Payer: COMMERCIAL

## 2025-04-28 VITALS
BODY MASS INDEX: 35.95 KG/M2 | DIASTOLIC BLOOD PRESSURE: 88 MMHG | HEART RATE: 79 BPM | SYSTOLIC BLOOD PRESSURE: 166 MMHG | HEIGHT: 74 IN

## 2025-04-28 DIAGNOSIS — L97.522 FOOT ULCER, LEFT, WITH FAT LAYER EXPOSED: Primary | ICD-10-CM

## 2025-04-28 DIAGNOSIS — E11.49 TYPE II DIABETES MELLITUS WITH NEUROLOGICAL MANIFESTATIONS: ICD-10-CM

## 2025-04-28 DIAGNOSIS — Z89.412 LEFT GREAT TOE AMPUTEE: ICD-10-CM

## 2025-04-28 PROCEDURE — 99999 PR PBB SHADOW E&M-EST. PATIENT-LVL IV: CPT | Mod: PBBFAC,,, | Performed by: PODIATRIST

## 2025-04-28 PROCEDURE — 3079F DIAST BP 80-89 MM HG: CPT | Mod: CPTII,S$GLB,, | Performed by: PODIATRIST

## 2025-04-28 PROCEDURE — 3008F BODY MASS INDEX DOCD: CPT | Mod: CPTII,S$GLB,, | Performed by: PODIATRIST

## 2025-04-28 PROCEDURE — 99213 OFFICE O/P EST LOW 20 MIN: CPT | Mod: 25,S$GLB,, | Performed by: PODIATRIST

## 2025-04-28 PROCEDURE — 3077F SYST BP >= 140 MM HG: CPT | Mod: CPTII,S$GLB,, | Performed by: PODIATRIST

## 2025-04-28 NOTE — TELEPHONE ENCOUNTER
Left voice message for patient to give our office a call back at [phone]. Assist with scheduling Sx Dr. Melara.

## 2025-04-30 ENCOUNTER — TELEPHONE (OUTPATIENT)
Dept: INTERNAL MEDICINE | Facility: CLINIC | Age: 60
End: 2025-04-30
Payer: COMMERCIAL

## 2025-04-30 ENCOUNTER — TELEPHONE (OUTPATIENT)
Dept: PODIATRY | Facility: CLINIC | Age: 60
End: 2025-04-30
Payer: COMMERCIAL

## 2025-04-30 NOTE — TELEPHONE ENCOUNTER
----- Message from Med Assistant Thomas sent at 4/30/2025  8:15 AM CDT -----  Regarding: surgery scheduler  Billy Yatesn is scheduled to have a EXCISION,TARSAL OR METATARSAL BONE,EXCLUDING TALUS OR CALCANEUS,PARTIAL Left Local MAC with Dr. Mingo Melara  on 6/5.  We request surgical clearance. please assist in scheduling. We request a  EKG, CBC, BMP, and any other testing deemed necessary by your office. When completed please note chart  stating whether the patient is cleared., along with any results to attention Martha Betts.  Please contact us if you have any questions.  Our office number is [phone].Sincerely,Martha Betts, MAFoot and Ankle SurgeryOchsner Medical Center, Department of Podiatry

## 2025-04-30 NOTE — TELEPHONE ENCOUNTER
Spoke to pt and discussed 6/6 surgery date. Also advised pt to contact their PCP to schedule an appointment to be cleared for surgery. Then advised pt that someone will call them the day before surgery with the surgery arrival time and instructions. Pt verbalized understanding and call ended.

## 2025-04-30 NOTE — TELEPHONE ENCOUNTER
Pt scheduled for Pre Op clearance for EXCISION,TARSAL OR METATARSAL BONE,EXCLUDING TALUS OR CALCANEUS,PARTIAL Left Local MAC. Surgery scheduled with Dr. Mingo Melara  on 6/5.     Need EKG, CBC, BMP, and any other testing deemed necessary

## 2025-05-07 ENCOUNTER — TELEPHONE (OUTPATIENT)
Dept: PHARMACY | Facility: CLINIC | Age: 60
End: 2025-05-07
Payer: COMMERCIAL

## 2025-05-07 NOTE — TELEPHONE ENCOUNTER
[facility]/Population Health Chart Review & Patient Outreach Details For Medication Adherence Project    Reason for Outreach Encounter: 3rd Party payor non-compliance report ([employer], [employer], C, etc)  2.  Patient Outreach Method: Comcasthart message  3.   Medication in question: atorvastatin   LAST FILLED: 11/30/24 for 90 day supply  Hyperlipidemia Medications              atorvastatin (LIPITOR) 80 MG tablet Take 1 tablet (80 mg total) by mouth every evening.               4.  Reviewed and or Updates Made To: Patient Chart  5. Outreach Outcomes and/or actions taken: Sent inquiry to patient: Waiting for response.

## 2025-05-08 ENCOUNTER — OFFICE VISIT (OUTPATIENT)
Dept: PODIATRY | Facility: CLINIC | Age: 60
End: 2025-05-08
Payer: COMMERCIAL

## 2025-05-08 VITALS
HEART RATE: 70 BPM | BODY MASS INDEX: 35.95 KG/M2 | DIASTOLIC BLOOD PRESSURE: 82 MMHG | SYSTOLIC BLOOD PRESSURE: 162 MMHG | HEIGHT: 74 IN

## 2025-05-08 DIAGNOSIS — Z89.412 LEFT GREAT TOE AMPUTEE: Primary | ICD-10-CM

## 2025-05-08 DIAGNOSIS — L84 CORN OR CALLUS: ICD-10-CM

## 2025-05-08 DIAGNOSIS — E11.49 TYPE II DIABETES MELLITUS WITH NEUROLOGICAL MANIFESTATIONS: ICD-10-CM

## 2025-05-08 PROCEDURE — 3079F DIAST BP 80-89 MM HG: CPT | Mod: CPTII,S$GLB,, | Performed by: PODIATRIST

## 2025-05-08 PROCEDURE — 1159F MED LIST DOCD IN RCRD: CPT | Mod: CPTII,S$GLB,, | Performed by: PODIATRIST

## 2025-05-08 PROCEDURE — 1160F RVW MEDS BY RX/DR IN RCRD: CPT | Mod: CPTII,S$GLB,, | Performed by: PODIATRIST

## 2025-05-08 PROCEDURE — 3077F SYST BP >= 140 MM HG: CPT | Mod: CPTII,S$GLB,, | Performed by: PODIATRIST

## 2025-05-08 PROCEDURE — 99213 OFFICE O/P EST LOW 20 MIN: CPT | Mod: S$GLB,,, | Performed by: PODIATRIST

## 2025-05-08 PROCEDURE — 99999 PR PBB SHADOW E&M-EST. PATIENT-LVL IV: CPT | Mod: PBBFAC,,, | Performed by: PODIATRIST

## 2025-05-08 PROCEDURE — 3008F BODY MASS INDEX DOCD: CPT | Mod: CPTII,S$GLB,, | Performed by: PODIATRIST

## 2025-05-08 NOTE — PROGRESS NOTES
Subjective:     Patient ID: Billy Rivera is a 59 y.o. male.    Chief Complaint: Wound Care (Left foot) and Diabetes Mellitus (Peterson Rivera FNP-C 01/03/2025)    Bilyl is a 59 y.o. male who presents to the clinic for evaluation and treatment of high risk feet. Billy has a past medical history of Allergy, CAD (coronary artery disease), native coronary artery (06/25/2013), Cancer, COVID-19 virus detected (09/01/2020), Diabetes mellitus, Diabetes mellitus, type 2, Disorder of kidney and ureter, Heart attack (04/2012), colon cancer, stage I, Hyperlipidemia, Hypertension, Muscular pain, NSTEMI May 2013 - peak troponin 0.22 (05/22/2013), MICHAELA (obstructive sleep apnea), Retinopathy due to secondary diabetes, and Uses self-applied continuous glucose monitoring device (01/03/2025). The patient's chief complaint is diabetic foot ulcer, r. This patient has documented high risk feet requiring routine maintenance secondary to diabetes mellitis and those secondary complications of diabetes, as mentioned..  Improving/no new complaints today.  Right foot has healed since previous metatarsal head resection.   PCP: Augie Ruiz MD    Date Last Seen by PCP:   Chief Complaint   Patient presents with    Wound Care     Left foot    Diabetes Mellitus     Peterson Rivera FNP-C 01/03/2025     Hemoglobin A1C   Date Value Ref Range Status   11/27/2024 10.2 (H) 4.0 - 5.6 % Final     Comment:     ADA Screening Guidelines:  5.7-6.4%  Consistent with prediabetes  >or=6.5%  Consistent with diabetes    High levels of fetal hemoglobin interfere with the HbA1C  assay. Heterozygous hemoglobin variants (HbS, HgC, etc)do  not significantly interfere with this assay.   However, presence of multiple variants may affect accuracy.     07/20/2023 8.7 (H) 4.0 - 5.6 % Final     Comment:     ADA Screening Guidelines:  5.7-6.4%  Consistent with prediabetes  >or=6.5%  Consistent with diabetes    High levels of fetal hemoglobin  "interfere with the HbA1C  assay. Heterozygous hemoglobin variants (HbS, HgC, etc)do  not significantly interfere with this assay.   However, presence of multiple variants may affect accuracy.     04/06/2023 9.4 (H) 4.0 - 5.6 % Final     Comment:     ADA Screening Guidelines:  5.7-6.4%  Consistent with prediabetes  >or=6.5%  Consistent with diabetes    High levels of fetal hemoglobin interfere with the HbA1C  assay. Heterozygous hemoglobin variants (HbS, HgC, etc)do  not significantly interfere with this assay.   However, presence of multiple variants may affect accuracy.         Review of Systems   Constitutional: Negative for chills, decreased appetite and fever.   Cardiovascular:  Negative for leg swelling.   Skin:  Positive for dry skin, nail changes and poor wound healing.   Musculoskeletal:  Negative for arthritis, joint pain, joint swelling and myalgias.   Gastrointestinal:  Negative for nausea and vomiting.   Neurological:  Positive for numbness, paresthesias and sensory change. Negative for loss of balance.        Objective:     Vitals:    05/08/25 1055   BP: (!) 162/82   Pulse: 70   Height: 6' 2" (1.88 m)   PainSc: 0-No pain   PainLoc: Foot        Physical Exam  Constitutional:       Appearance: He is well-developed.   Cardiovascular:      Comments: Dorsalis pedis and posterior tibial pulses are diminished Bilaterally. Toes are cool to touch. Feet are warm proximally.There is decreased digital hair. Skin is atrophic, slightly hyperpigmented, and mildly edematous      Musculoskeletal:         General: Swelling and deformity present. No tenderness. Normal range of motion.      Comments: Adequate joint range of motion without pain, limitation, nor crepitation Bilateral feet and ankle joints. Muscle strength is 5/5 in all groups bilaterally.         Skin:     General: Skin is warm and dry.      Findings: No ecchymosis, erythema or lesion.   Neurological:      Mental Status: He is alert and oriented to person, " place, and time.      Comments: [place]-Roge 5.07 monofilamant testing is diminished Vinicio feet. Sharp/dull sensation diminished Bilaterally. Light touch absent Bilaterally.       Psychiatric:         Behavior: Behavior normal.       Wounds healed right and left foot          Assessment:      Encounter Diagnoses   Name Primary?    Left great toe amputee Yes    Type II diabetes mellitus with neurological manifestations     Corn or callus        Plan:     Billy was seen today for wound care and diabetes mellitus.    Diagnoses and all orders for this visit:    Left great toe amputee    Type II diabetes mellitus with neurological manifestations    Corn or callus        I counseled the patient on his conditions, their implications and medical management.    Independent review of patients previous clinical notes    Decision making:  Chronic illnesses discussed in detail, previous records/notes and imaging independently reviewed, prescription drug management performed in addition to lengthy discussion regarding both conservative and surgical treatment options.      The nature of the condition, options for management, as well as potential risks and complications were discussed in detail with patient. Patient was amenable to my recommendations and left my office fully informed and will follow up as instructed or sooner if necessary.      The patient has decided to forego any further conservative treatment and opted for surgical intervention.  Alternative treatments, and benefits of surgery were discussed with the patient.  Risks and complications, including but not limited to: pain, swelling, numbness, infection, failure to achieve the stated goals, recurrence of problem, nerve and blood vessel damage, scar tissue formation, further need of surgery, loss of limb or life, was discussed in detail with the patient.  All questions asked were answered, and written informed consent was sign and received. The patient will  undergo revision left foot/1st metatarsal partial ray resection.    In the meantime wound care discussed in detail/football dressing applied/follow-up in 1 week.

## 2025-05-09 NOTE — PROGRESS NOTES
Subjective:     Patient ID: Billy Rivera is a 59 y.o. male.    Chief Complaint: Wound Care (Left foot) and Diabetes Mellitus (Augie Ruiz MD 01/02/2025)    Billy is a 59 y.o. male who presents to the clinic for evaluation and treatment of high risk feet. Billy has a past medical history of Allergy, CAD (coronary artery disease), native coronary artery (06/25/2013), Cancer, COVID-19 virus detected (09/01/2020), Diabetes mellitus, Diabetes mellitus, type 2, Disorder of kidney and ureter, Heart attack (04/2012), colon cancer, stage I, Hyperlipidemia, Hypertension, Muscular pain, NSTEMI May 2013 - peak troponin 0.22 (05/22/2013), MICHAELA (obstructive sleep apnea), Retinopathy due to secondary diabetes, and Uses self-applied continuous glucose monitoring device (01/03/2025). The patient's chief complaint is diabetic foot ulcer, r. This patient has documented high risk feet requiring routine maintenance secondary to diabetes mellitis and those secondary complications of diabetes, as mentioned..  Improving/no new complaints today.  Right foot has healed since previous metatarsal head resection.  Ongoing issues with left 1st ray stump status post partial ray resection several years ago.  Ongoing ulceration with on and off infections.  Upcoming metatarsal exostectomy scheduled.      PCP: Augie Ruiz MD    Date Last Seen by PCP:   Chief Complaint   Patient presents with    Wound Care     Left foot    Diabetes Mellitus     Augie Ruiz MD 01/02/2025     Hemoglobin A1C   Date Value Ref Range Status   11/27/2024 10.2 (H) 4.0 - 5.6 % Final     Comment:     ADA Screening Guidelines:  5.7-6.4%  Consistent with prediabetes  >or=6.5%  Consistent with diabetes    High levels of fetal hemoglobin interfere with the HbA1C  assay. Heterozygous hemoglobin variants (HbS, HgC, etc)do  not significantly interfere with this assay.   However, presence of multiple variants may affect accuracy.     07/20/2023 8.7  "(H) 4.0 - 5.6 % Final     Comment:     ADA Screening Guidelines:  5.7-6.4%  Consistent with prediabetes  >or=6.5%  Consistent with diabetes    High levels of fetal hemoglobin interfere with the HbA1C  assay. Heterozygous hemoglobin variants (HbS, HgC, etc)do  not significantly interfere with this assay.   However, presence of multiple variants may affect accuracy.     04/06/2023 9.4 (H) 4.0 - 5.6 % Final     Comment:     ADA Screening Guidelines:  5.7-6.4%  Consistent with prediabetes  >or=6.5%  Consistent with diabetes    High levels of fetal hemoglobin interfere with the HbA1C  assay. Heterozygous hemoglobin variants (HbS, HgC, etc)do  not significantly interfere with this assay.   However, presence of multiple variants may affect accuracy.         Review of Systems   Constitutional: Negative for chills, decreased appetite and fever.   Cardiovascular:  Negative for leg swelling.   Skin:  Positive for dry skin, nail changes and poor wound healing.   Musculoskeletal:  Negative for arthritis, joint pain, joint swelling and myalgias.   Gastrointestinal:  Negative for nausea and vomiting.   Neurological:  Positive for numbness, paresthesias and sensory change. Negative for loss of balance.        Objective:     Vitals:    04/28/25 1501   BP: (!) 166/88   Pulse: 79   Height: 6' 2" (1.88 m)   PainSc: 0-No pain   PainLoc: Foot        Physical Exam  Constitutional:       Appearance: He is well-developed.   Cardiovascular:      Comments: Dorsalis pedis and posterior tibial pulses are diminished Bilaterally. Toes are cool to touch. Feet are warm proximally.There is decreased digital hair. Skin is atrophic, slightly hyperpigmented, and mildly edematous      Musculoskeletal:         General: Swelling and deformity present. No tenderness. Normal range of motion.      Comments: Adequate joint range of motion without pain, limitation, nor crepitation Bilateral feet and ankle joints. Muscle strength is 5/5 in all groups " "bilaterally.         Skin:     General: Skin is warm and dry.      Findings: No ecchymosis, erythema or lesion.   Neurological:      Mental Status: He is alert and oriented to person, place, and time.      Comments: [place]-Roge 5.07 monofilamant testing is diminished Vinicio feet. Sharp/dull sensation diminished Bilaterally. Light touch absent Bilaterally.       Psychiatric:         Behavior: Behavior normal.       Wounds healed right foot,     Ulceration  to the left partial 1st ray site measuring 0.5 cm x 0.3 cm x 0.1 cm/subcutaneous tissue, periwound hyperkeratosis.       Assessment:      Encounter Diagnoses   Name Primary?    Foot ulcer, left, with fat layer exposed Yes    Left great toe amputee     Type II diabetes mellitus with neurological manifestations        Plan:     Billy was seen today for wound care and diabetes mellitus.    Diagnoses and all orders for this visit:    Foot ulcer, left, with fat layer exposed    Left great toe amputee    Type II diabetes mellitus with neurological manifestations        I counseled the patient on his conditions, their implications and medical management.    Independent review of patients previous clinical notes    Decision making:  Chronic illnesses discussed in detail, previous records/notes and imaging independently reviewed, prescription drug management performed in addition to lengthy discussion regarding both conservative and surgical treatment options.      Wound Debridement    Performed by: Mingo Melara DPM   Authorized by: Patient    Time out: Immediately prior to procedure a "time out" was called to verify the correct patient, procedure, equipment, support staff and site/side marked as required.   Consent Done?:  Yes (Verbal)  Local anesthesia used?: No       Wound Details:    Location:  Left foot     Type of Debridement:  Excisional       Length (cm):  0.5       Width (cm):   0.3       Depth (cm):  0.1       Percent Debrided (%):  100             Depth " of debridement:  Subcutaneous tissue    Tissue debrided:  Dermis, Epidermis and Subcutaneous    Devitalized tissue debrided:  Biofilm, Callus and Necrotic/Eschar    Instruments:  Blade, Curette and Nippers     Bleeding:  Minimal  Hemostasis Achieved: Yes    Method Used:  Pressure  Patient tolerance:  Patient tolerated the procedure well with no immediate complications      The nature of the condition, options for management, as well as potential risks and complications were discussed in detail with patient. Patient was amenable to my recommendations and left my office fully informed and will follow up as instructed or sooner if necessary.      The patient has decided to forego any further conservative treatment and opted for surgical intervention.  Alternative treatments, and benefits of surgery were discussed with the patient.  Risks and complications, including but not limited to: pain, swelling, numbness, infection, failure to achieve the stated goals, recurrence of problem, nerve and blood vessel damage, scar tissue formation, further need of surgery, loss of limb or life, was discussed in detail with the patient.  All questions asked were answered, and written informed consent was sign and received. The patient will undergo revision left foot/1st metatarsal partial ray resection.    In the meantime wound care discussed in detail/football dressing applied/follow-up in 1 week.

## 2025-05-14 ENCOUNTER — PATIENT MESSAGE (OUTPATIENT)
Dept: PODIATRY | Facility: CLINIC | Age: 60
End: 2025-05-14

## 2025-05-14 ENCOUNTER — OFFICE VISIT (OUTPATIENT)
Dept: PODIATRY | Facility: CLINIC | Age: 60
End: 2025-05-14
Payer: COMMERCIAL

## 2025-05-14 VITALS
HEART RATE: 75 BPM | SYSTOLIC BLOOD PRESSURE: 133 MMHG | HEIGHT: 74 IN | BODY MASS INDEX: 35.95 KG/M2 | DIASTOLIC BLOOD PRESSURE: 83 MMHG

## 2025-05-14 DIAGNOSIS — Z89.412 LEFT GREAT TOE AMPUTEE: Primary | ICD-10-CM

## 2025-05-14 DIAGNOSIS — L84 CORN OR CALLUS: ICD-10-CM

## 2025-05-14 DIAGNOSIS — E11.49 TYPE II DIABETES MELLITUS WITH NEUROLOGICAL MANIFESTATIONS: ICD-10-CM

## 2025-05-14 PROCEDURE — 99999 PR PBB SHADOW E&M-EST. PATIENT-LVL IV: CPT | Mod: PBBFAC,,, | Performed by: PODIATRIST

## 2025-05-14 NOTE — PROGRESS NOTES
Subjective:     Patient ID: Billy Rivera is a 59 y.o. male.    Chief Complaint: Wound Care (left)    Billy is a 59 y.o. male who presents to the clinic for evaluation and treatment of high risk feet. Billy has a past medical history of Allergy, CAD (coronary artery disease), native coronary artery (06/25/2013), Cancer, COVID-19 virus detected (09/01/2020), Diabetes mellitus, Diabetes mellitus, type 2, Disorder of kidney and ureter, Heart attack (04/2012), colon cancer, stage I, Hyperlipidemia, Hypertension, Muscular pain, NSTEMI May 2013 - peak troponin 0.22 (05/22/2013), MICHAELA (obstructive sleep apnea), Retinopathy due to secondary diabetes, and Uses self-applied continuous glucose monitoring device (01/03/2025). The patient's chief complaint is diabetic foot ulcer, r. This patient has documented high risk feet requiring routine maintenance secondary to diabetes mellitis and those secondary complications of diabetes, as mentioned..  Improving/no new complaints today.  Right foot has healed since previous metatarsal head resection.  Pending surgery for bone resection 6/6/25.      PCP: Augie Ruiz MD    Date Last Seen by PCP:   Chief Complaint   Patient presents with    Wound Care     left     Hemoglobin A1C   Date Value Ref Range Status   11/27/2024 10.2 (H) 4.0 - 5.6 % Final     Comment:     ADA Screening Guidelines:  5.7-6.4%  Consistent with prediabetes  >or=6.5%  Consistent with diabetes    High levels of fetal hemoglobin interfere with the HbA1C  assay. Heterozygous hemoglobin variants (HbS, HgC, etc)do  not significantly interfere with this assay.   However, presence of multiple variants may affect accuracy.     07/20/2023 8.7 (H) 4.0 - 5.6 % Final     Comment:     ADA Screening Guidelines:  5.7-6.4%  Consistent with prediabetes  >or=6.5%  Consistent with diabetes    High levels of fetal hemoglobin interfere with the HbA1C  assay. Heterozygous hemoglobin variants (HbS, HgC, etc)do  not  "significantly interfere with this assay.   However, presence of multiple variants may affect accuracy.     04/06/2023 9.4 (H) 4.0 - 5.6 % Final     Comment:     ADA Screening Guidelines:  5.7-6.4%  Consistent with prediabetes  >or=6.5%  Consistent with diabetes    High levels of fetal hemoglobin interfere with the HbA1C  assay. Heterozygous hemoglobin variants (HbS, HgC, etc)do  not significantly interfere with this assay.   However, presence of multiple variants may affect accuracy.         Review of Systems   Constitutional: Negative for chills, decreased appetite and fever.   Cardiovascular:  Negative for leg swelling.   Skin:  Positive for dry skin, nail changes and poor wound healing.   Musculoskeletal:  Negative for arthritis, joint pain, joint swelling and myalgias.   Gastrointestinal:  Negative for nausea and vomiting.   Neurological:  Positive for numbness, paresthesias and sensory change. Negative for loss of balance.        Objective:     Vitals:    05/14/25 1205   BP: 133/83   Pulse: 75   Height: 6' 2" (1.88 m)   PainSc: 0-No pain   PainLoc: Foot        Physical Exam  Constitutional:       Appearance: He is well-developed.   Cardiovascular:      Comments: Dorsalis pedis and posterior tibial pulses are diminished Bilaterally. Toes are cool to touch. Feet are warm proximally.There is decreased digital hair. Skin is atrophic, slightly hyperpigmented, and mildly edematous      Musculoskeletal:         General: Swelling and deformity present. No tenderness. Normal range of motion.      Comments: Adequate joint range of motion without pain, limitation, nor crepitation Bilateral feet and ankle joints. Muscle strength is 5/5 in all groups bilaterally.         Skin:     General: Skin is warm and dry.      Findings: No ecchymosis, erythema or lesion.   Neurological:      Mental Status: He is alert and oriented to person, place, and time.      Comments: [place]-Roge 5.07 monofilamant testing is diminished Vinicio " "feet. Sharp/dull sensation diminished Bilaterally. Light touch absent Bilaterally.       Psychiatric:         Behavior: Behavior normal.     Wounds healed right and left foot          Assessment:      Encounter Diagnoses   Name Primary?    Left great toe amputee Yes    Type II diabetes mellitus with neurological manifestations        Plan:     Billy was seen today for wound care.    Diagnoses and all orders for this visit:    Left great toe amputee    Type II diabetes mellitus with neurological manifestations        I counseled the patient on his conditions, their implications and medical management.    Independent review of patients previous clinical notes    Decision making:  Chronic illnesses discussed in detail, previous records/notes and imaging independently reviewed, prescription drug management performed in addition to lengthy discussion regarding both conservative and surgical treatment options.      The nature of the condition, options for management, as well as potential risks and complications were discussed in detail with patient. Patient was amenable to my recommendations and left my office fully informed and will follow up as instructed or sooner if necessary.      Callus of left foot pared down via 15 blade.  Procedure note to follow    Routine Foot Care    Date/Time: 5/14/2025 11:30 AM    Performed by: Juanjose Luna MD  Authorized by: Mingo Melara DPM    Time out: Immediately prior to procedure a "time out" was called to verify the correct patient, procedure, equipment, support staff and site/side marked as required.    Consent Done?:  Yes (Verbal)  Hyperkeratotic Skin Lesions?: Yes    Number of trimmed lesions:  1  Location(s):  Left 1st Metatarsal Head    Nail Care Type:  Trim      The patient has decided to forego any further conservative treatment and opted for surgical intervention.  Alternative treatments, and benefits of surgery were discussed with the patient.  Risks and " complications, including but not limited to: pain, swelling, numbness, infection, failure to achieve the stated goals, recurrence of problem, nerve and blood vessel damage, scar tissue formation, further need of surgery, loss of limb or life, was discussed in detail with the patient.  All questions asked were answered, and written informed consent was sign and received. The patient will undergo revision left foot/1st metatarsal partial ray resection.  This has been scheduled.    In the meantime wound care discussed in detail/football dressing applied/follow-up in 1 week.

## 2025-05-19 ENCOUNTER — TELEPHONE (OUTPATIENT)
Dept: PODIATRY | Facility: CLINIC | Age: 60
End: 2025-05-19
Payer: COMMERCIAL

## 2025-05-20 ENCOUNTER — CLINICAL SUPPORT (OUTPATIENT)
Dept: OPHTHALMOLOGY | Facility: CLINIC | Age: 60
End: 2025-05-20
Payer: COMMERCIAL

## 2025-05-20 ENCOUNTER — OFFICE VISIT (OUTPATIENT)
Dept: OPHTHALMOLOGY | Facility: CLINIC | Age: 60
End: 2025-05-20
Payer: COMMERCIAL

## 2025-05-20 DIAGNOSIS — H43.13 VITREOUS HEMORRHAGE, BILATERAL: ICD-10-CM

## 2025-05-20 DIAGNOSIS — E11.3513 TYPE 2 DIABETES MELLITUS WITH BOTH EYES AFFECTED BY PROLIFERATIVE RETINOPATHY AND MACULAR EDEMA, WITH LONG-TERM CURRENT USE OF INSULIN: ICD-10-CM

## 2025-05-20 DIAGNOSIS — E11.3513 TYPE 2 DIABETES MELLITUS WITH BOTH EYES AFFECTED BY PROLIFERATIVE RETINOPATHY AND MACULAR EDEMA, WITH LONG-TERM CURRENT USE OF INSULIN: Primary | ICD-10-CM

## 2025-05-20 DIAGNOSIS — Z79.4 TYPE 2 DIABETES MELLITUS WITH BOTH EYES AFFECTED BY PROLIFERATIVE RETINOPATHY AND MACULAR EDEMA, WITH LONG-TERM CURRENT USE OF INSULIN: Primary | ICD-10-CM

## 2025-05-20 DIAGNOSIS — Z79.4 TYPE 2 DIABETES MELLITUS WITH BOTH EYES AFFECTED BY PROLIFERATIVE RETINOPATHY AND MACULAR EDEMA, WITH LONG-TERM CURRENT USE OF INSULIN: ICD-10-CM

## 2025-05-20 PROCEDURE — 92134 CPTRZ OPH DX IMG PST SGM RTA: CPT | Mod: S$GLB,,, | Performed by: OPHTHALMOLOGY

## 2025-05-20 PROCEDURE — 1160F RVW MEDS BY RX/DR IN RCRD: CPT | Mod: CPTII,S$GLB,, | Performed by: OPHTHALMOLOGY

## 2025-05-20 PROCEDURE — 1159F MED LIST DOCD IN RCRD: CPT | Mod: CPTII,S$GLB,, | Performed by: OPHTHALMOLOGY

## 2025-05-20 PROCEDURE — 67028 INJECTION EYE DRUG: CPT | Mod: 79,50,S$GLB, | Performed by: OPHTHALMOLOGY

## 2025-05-20 PROCEDURE — 92014 COMPRE OPH EXAM EST PT 1/>: CPT | Mod: 24,25,S$GLB, | Performed by: OPHTHALMOLOGY

## 2025-05-20 PROCEDURE — 99999 PR PBB SHADOW E&M-EST. PATIENT-LVL III: CPT | Mod: PBBFAC,,, | Performed by: OPHTHALMOLOGY

## 2025-05-20 RX ADMIN — Medication 1.25 MG: at 12:05

## 2025-05-20 NOTE — PROGRESS NOTES
HPI     Post-op Evaluation     Additional comments: 1 mo s/p PPV OS           Comments    VA stable OS, cloudy VA OD, no pain ou and denies floaters or flashes.    No gtts          Last edited by Mandy Rivera on 5/20/2025 10:19 AM.               OCT - OD preretinal heme - improving  OS  DME      A/P    PDR OU  T2 uncontrolled with insulin    2. VH OU  Preretinal heme OD  OS with diffuse VH  S/p Avastin OU x 2 last 4/1/25    Will need PRP OD - as heme continuing to improve      S/p 25g PPV/EL/partial AFx/Avastin OS 4/9/25    3. DME OS>OD    Avastin OU today      4.  HTN Ret OU  BS/BP/chol control    5. NS OU      6 weeks  OCT and dilate OD      Risks, benefits, and alternatives to treatment discussed in detail with the patient.  The patient voiced understanding and wished to proceed with the procedure    Injection Procedure Note:  Diagnosis: PDR with VH OD, DME OS    Patient Identified and Time Out complete  Pt marked  Topical Proparacaine and Betadine.  Inject Avastin OU at 6:00 @ 3.5-4mm posterior to limbus  Post Operative Dx: Same  Complications: None  Follow up as above.

## 2025-05-21 ENCOUNTER — TELEPHONE (OUTPATIENT)
Dept: PODIATRY | Facility: CLINIC | Age: 60
End: 2025-05-21
Payer: COMMERCIAL

## 2025-05-21 ENCOUNTER — PATIENT MESSAGE (OUTPATIENT)
Dept: PODIATRY | Facility: CLINIC | Age: 60
End: 2025-05-21
Payer: COMMERCIAL

## 2025-05-21 NOTE — TELEPHONE ENCOUNTER
Left voice message for patient to give our office a call back at [phone] or . To discuss pre/op for Sx that is scheduled for 6/6 with Dr. Mleara.

## 2025-05-22 ENCOUNTER — OFFICE VISIT (OUTPATIENT)
Dept: PODIATRY | Facility: CLINIC | Age: 60
End: 2025-05-22
Payer: COMMERCIAL

## 2025-05-22 VITALS
HEIGHT: 74 IN | DIASTOLIC BLOOD PRESSURE: 88 MMHG | SYSTOLIC BLOOD PRESSURE: 153 MMHG | HEART RATE: 52 BPM | BODY MASS INDEX: 35.95 KG/M2

## 2025-05-22 DIAGNOSIS — L97.522 FOOT ULCER, LEFT, WITH FAT LAYER EXPOSED: ICD-10-CM

## 2025-05-22 DIAGNOSIS — Z89.412 LEFT GREAT TOE AMPUTEE: Primary | ICD-10-CM

## 2025-05-22 DIAGNOSIS — E11.49 TYPE II DIABETES MELLITUS WITH NEUROLOGICAL MANIFESTATIONS: ICD-10-CM

## 2025-05-22 PROCEDURE — 99213 OFFICE O/P EST LOW 20 MIN: CPT | Mod: S$GLB,,, | Performed by: PODIATRIST

## 2025-05-22 PROCEDURE — 3079F DIAST BP 80-89 MM HG: CPT | Mod: CPTII,S$GLB,, | Performed by: PODIATRIST

## 2025-05-22 PROCEDURE — 3008F BODY MASS INDEX DOCD: CPT | Mod: CPTII,S$GLB,, | Performed by: PODIATRIST

## 2025-05-22 PROCEDURE — 3077F SYST BP >= 140 MM HG: CPT | Mod: CPTII,S$GLB,, | Performed by: PODIATRIST

## 2025-05-22 PROCEDURE — 99999 PR PBB SHADOW E&M-EST. PATIENT-LVL IV: CPT | Mod: PBBFAC,,, | Performed by: PODIATRIST

## 2025-05-22 PROCEDURE — 1159F MED LIST DOCD IN RCRD: CPT | Mod: CPTII,S$GLB,, | Performed by: PODIATRIST

## 2025-05-26 ENCOUNTER — TELEPHONE (OUTPATIENT)
Dept: PODIATRY | Facility: CLINIC | Age: 60
End: 2025-05-26
Payer: COMMERCIAL

## 2025-05-26 NOTE — TELEPHONE ENCOUNTER
Left voice message for patient to give our office a call back at [phone]. Pre/op is needed for Sx that is scheduled for 6/6 with Dr. Melara.

## 2025-05-28 ENCOUNTER — TELEPHONE (OUTPATIENT)
Dept: PODIATRY | Facility: CLINIC | Age: 60
End: 2025-05-28
Payer: COMMERCIAL

## 2025-05-28 ENCOUNTER — PATIENT MESSAGE (OUTPATIENT)
Dept: ADMINISTRATIVE | Facility: HOSPITAL | Age: 60
End: 2025-05-28
Payer: COMMERCIAL

## 2025-05-28 NOTE — TELEPHONE ENCOUNTER
Left voice message for patient to give our office a call back at [phone]. To discuss pre/op visit before surgery.

## 2025-05-29 ENCOUNTER — PATIENT MESSAGE (OUTPATIENT)
Dept: INTERNAL MEDICINE | Facility: CLINIC | Age: 60
End: 2025-05-29
Payer: COMMERCIAL

## 2025-05-30 ENCOUNTER — TELEPHONE (OUTPATIENT)
Dept: ENDOCRINOLOGY | Facility: CLINIC | Age: 60
End: 2025-05-30
Payer: COMMERCIAL

## 2025-05-30 DIAGNOSIS — Z79.4 TYPE 2 DIABETES MELLITUS WITH HYPERGLYCEMIA, WITH LONG-TERM CURRENT USE OF INSULIN: ICD-10-CM

## 2025-05-30 DIAGNOSIS — E11.65 TYPE 2 DIABETES MELLITUS WITH HYPERGLYCEMIA, WITH LONG-TERM CURRENT USE OF INSULIN: ICD-10-CM

## 2025-05-30 RX ORDER — INSULIN ASPART 100 [IU]/ML
10 INJECTION, SOLUTION INTRAVENOUS; SUBCUTANEOUS
Qty: 27 ML | Refills: 0 | Status: SHIPPED | OUTPATIENT
Start: 2025-05-30 | End: 2025-08-28

## 2025-05-30 NOTE — PROGRESS NOTES
Subjective:     Patient ID: Billy Rivera is a 59 y.o. male.    Chief Complaint: Wound Care (Left foot) and Diabetes Mellitus (Augie Ruiz MD 01/02/2025)    Billy is a 59 y.o. male who presents to the clinic for evaluation and treatment of high risk feet. Billy has a past medical history of Allergy, CAD (coronary artery disease), native coronary artery (06/25/2013), Cancer, COVID-19 virus detected (09/01/2020), Diabetes mellitus, Diabetes mellitus, type 2, Disorder of kidney and ureter, Heart attack (04/2012), colon cancer, stage I, Hyperlipidemia, Hypertension, Muscular pain, NSTEMI May 2013 - peak troponin 0.22 (05/22/2013), MICHAELA (obstructive sleep apnea), Retinopathy due to secondary diabetes, and Uses self-applied continuous glucose monitoring device (01/03/2025). The patient's chief complaint is diabetic foot ulcer, r. This patient has documented high risk feet requiring routine maintenance secondary to diabetes mellitis and those secondary complications of diabetes, as mentioned..  Improving/no new complaints today.    Right foot has healed since previous metatarsal head resection.  Upcoming procedure for left exostectomy.    PCP: Augie Ruiz MD    Date Last Seen by PCP:   Chief Complaint   Patient presents with    Wound Care     Left foot    Diabetes Mellitus     Augie Ruiz MD 01/02/2025     Hemoglobin A1C   Date Value Ref Range Status   11/27/2024 10.2 (H) 4.0 - 5.6 % Final     Comment:     ADA Screening Guidelines:  5.7-6.4%  Consistent with prediabetes  >or=6.5%  Consistent with diabetes    High levels of fetal hemoglobin interfere with the HbA1C  assay. Heterozygous hemoglobin variants (HbS, HgC, etc)do  not significantly interfere with this assay.   However, presence of multiple variants may affect accuracy.     07/20/2023 8.7 (H) 4.0 - 5.6 % Final     Comment:     ADA Screening Guidelines:  5.7-6.4%  Consistent with prediabetes  >or=6.5%  Consistent with  "diabetes    High levels of fetal hemoglobin interfere with the HbA1C  assay. Heterozygous hemoglobin variants (HbS, HgC, etc)do  not significantly interfere with this assay.   However, presence of multiple variants may affect accuracy.     04/06/2023 9.4 (H) 4.0 - 5.6 % Final     Comment:     ADA Screening Guidelines:  5.7-6.4%  Consistent with prediabetes  >or=6.5%  Consistent with diabetes    High levels of fetal hemoglobin interfere with the HbA1C  assay. Heterozygous hemoglobin variants (HbS, HgC, etc)do  not significantly interfere with this assay.   However, presence of multiple variants may affect accuracy.         Review of Systems   Constitutional: Negative for chills, decreased appetite and fever.   Cardiovascular:  Negative for leg swelling.   Skin:  Positive for dry skin, nail changes and poor wound healing.   Musculoskeletal:  Negative for arthritis, joint pain, joint swelling and myalgias.   Gastrointestinal:  Negative for nausea and vomiting.   Neurological:  Positive for numbness, paresthesias and sensory change. Negative for loss of balance.        Objective:     Vitals:    05/22/25 1503   BP: (!) 153/88   Pulse: (!) 52   Height: 6' 2" (1.88 m)   PainSc: 0-No pain   PainLoc: Foot        Physical Exam  Constitutional:       Appearance: He is well-developed.   Cardiovascular:      Comments: Dorsalis pedis and posterior tibial pulses are diminished Bilaterally. Toes are cool to touch. Feet are warm proximally.There is decreased digital hair. Skin is atrophic, slightly hyperpigmented, and mildly edematous      Musculoskeletal:         General: Swelling and deformity present. No tenderness. Normal range of motion.      Comments: Adequate joint range of motion without pain, limitation, nor crepitation Bilateral feet and ankle joints. Muscle strength is 5/5 in all groups bilaterally.         Skin:     General: Skin is warm and dry.      Findings: No ecchymosis, erythema or lesion.   Neurological:      " Mental Status: He is alert and oriented to person, place, and time.      Comments: [place]-Roge 5.07 monofilamant testing is diminished Vinicio feet. Sharp/dull sensation diminished Bilaterally. Light touch absent Bilaterally.       Psychiatric:         Behavior: Behavior normal.       Wounds healed right and left foot          Assessment:      Encounter Diagnoses   Name Primary?    Left great toe amputee Yes    Type II diabetes mellitus with neurological manifestations     Foot ulcer, left, with fat layer exposed        Plan:     Billy was seen today for wound care and diabetes mellitus.    Diagnoses and all orders for this visit:    Left great toe amputee    Type II diabetes mellitus with neurological manifestations    Foot ulcer, left, with fat layer exposed        I counseled the patient on his conditions, their implications and medical management.    Independent review of patients previous clinical notes    Decision making:  Chronic illnesses discussed in detail, previous records/notes and imaging independently reviewed, prescription drug management performed in addition to lengthy discussion regarding both conservative and surgical treatment options.      The nature of the condition, options for management, as well as potential risks and complications were discussed in detail with patient. Patient was amenable to my recommendations and left my office fully informed and will follow up as instructed or sooner if necessary.      The patient has decided to forego any further conservative treatment and opted for surgical intervention.  Alternative treatments, and benefits of surgery were discussed with the patient.  Risks and complications, including but not limited to: pain, swelling, numbness, infection, failure to achieve the stated goals, recurrence of problem, nerve and blood vessel damage, scar tissue formation, further need of surgery, loss of limb or life, was discussed in detail with the patient.  All  questions asked were answered, and written informed consent was sign and received. The patient will undergo revision left foot/1st metatarsal partial ray resection.

## 2025-05-30 NOTE — TELEPHONE ENCOUNTER
----- Message from [facility] DANIELLE Nichols sent at 5/30/2025 12:43 PM CDT -----  Ino Gastelum,Can you find out which insulin he needs? He's scheduled to see us 07/16/2025. I can send a temporary prescription for whichever insulin he needs until his appointment. Thanks!  ----- Message -----  From: Georgette Stock  Sent: 5/30/2025  11:45 AM CDT  To: Simone [facility] Mary Carmen Staff    .[email or web address] Patient is calling for Medical Advice regarding: pt would like a call back in regards to his medications. He says that [facility] is only allowing him 3 units a day of his insulin rather than what he was prescribed. Please call an advise.  How long has patient had these symptoms:Pharmacy name and phone#: [facility] [address] [place] [phone] [facility] [address]Tbghb: [phone] Fax: [phone] wants a call back or thru [facility]:Comments:Please advise patient replies from provider may take up to 48 hours.

## 2025-06-02 ENCOUNTER — TELEPHONE (OUTPATIENT)
Dept: PHARMACY | Facility: CLINIC | Age: 60
End: 2025-06-02
Payer: COMMERCIAL

## 2025-06-02 ENCOUNTER — OFFICE VISIT (OUTPATIENT)
Dept: PODIATRY | Facility: CLINIC | Age: 60
End: 2025-06-02
Payer: COMMERCIAL

## 2025-06-02 VITALS
BODY MASS INDEX: 35.95 KG/M2 | DIASTOLIC BLOOD PRESSURE: 80 MMHG | HEIGHT: 74 IN | SYSTOLIC BLOOD PRESSURE: 143 MMHG | HEART RATE: 60 BPM

## 2025-06-02 DIAGNOSIS — E11.628 DIABETIC FOOT INFECTION: Primary | ICD-10-CM

## 2025-06-02 DIAGNOSIS — L08.9 DIABETIC FOOT INFECTION: Primary | ICD-10-CM

## 2025-06-02 DIAGNOSIS — L97.522 ULCER OF LEFT FOOT WITH FAT LAYER EXPOSED: ICD-10-CM

## 2025-06-02 LAB
GRAM STN SPEC: NORMAL
GRAM STN SPEC: NORMAL

## 2025-06-02 PROCEDURE — 3077F SYST BP >= 140 MM HG: CPT | Mod: CPTII,S$GLB,, | Performed by: STUDENT IN AN ORGANIZED HEALTH CARE EDUCATION/TRAINING PROGRAM

## 2025-06-02 PROCEDURE — 87076 CULTURE ANAEROBE IDENT EACH: CPT | Performed by: STUDENT IN AN ORGANIZED HEALTH CARE EDUCATION/TRAINING PROGRAM

## 2025-06-02 PROCEDURE — 87205 SMEAR GRAM STAIN: CPT | Performed by: STUDENT IN AN ORGANIZED HEALTH CARE EDUCATION/TRAINING PROGRAM

## 2025-06-02 PROCEDURE — 99999 PR PBB SHADOW E&M-EST. PATIENT-LVL IV: CPT | Mod: PBBFAC,,, | Performed by: STUDENT IN AN ORGANIZED HEALTH CARE EDUCATION/TRAINING PROGRAM

## 2025-06-02 PROCEDURE — 3079F DIAST BP 80-89 MM HG: CPT | Mod: CPTII,S$GLB,, | Performed by: STUDENT IN AN ORGANIZED HEALTH CARE EDUCATION/TRAINING PROGRAM

## 2025-06-02 PROCEDURE — 1159F MED LIST DOCD IN RCRD: CPT | Mod: CPTII,S$GLB,, | Performed by: STUDENT IN AN ORGANIZED HEALTH CARE EDUCATION/TRAINING PROGRAM

## 2025-06-02 PROCEDURE — 87186 SC STD MICRODIL/AGAR DIL: CPT | Performed by: STUDENT IN AN ORGANIZED HEALTH CARE EDUCATION/TRAINING PROGRAM

## 2025-06-02 PROCEDURE — 99214 OFFICE O/P EST MOD 30 MIN: CPT | Mod: 25,S$GLB,, | Performed by: STUDENT IN AN ORGANIZED HEALTH CARE EDUCATION/TRAINING PROGRAM

## 2025-06-02 PROCEDURE — 11042 DBRDMT SUBQ TIS 1ST 20SQCM/<: CPT | Mod: S$GLB,,, | Performed by: STUDENT IN AN ORGANIZED HEALTH CARE EDUCATION/TRAINING PROGRAM

## 2025-06-02 PROCEDURE — 3008F BODY MASS INDEX DOCD: CPT | Mod: CPTII,S$GLB,, | Performed by: STUDENT IN AN ORGANIZED HEALTH CARE EDUCATION/TRAINING PROGRAM

## 2025-06-02 RX ORDER — DOXYCYCLINE 100 MG/1
100 CAPSULE ORAL 2 TIMES DAILY
Qty: 14 CAPSULE | Refills: 0 | Status: SHIPPED | OUTPATIENT
Start: 2025-06-02

## 2025-06-04 ENCOUNTER — RESULTS FOLLOW-UP (OUTPATIENT)
Dept: PODIATRY | Facility: CLINIC | Age: 60
End: 2025-06-04

## 2025-06-04 LAB — BACTERIA SPEC AEROBE CULT: ABNORMAL

## 2025-06-06 LAB
BACTERIA SPEC ANAEROBE CULT: ABNORMAL

## 2025-06-12 ENCOUNTER — OFFICE VISIT (OUTPATIENT)
Dept: PODIATRY | Facility: CLINIC | Age: 60
End: 2025-06-12
Payer: COMMERCIAL

## 2025-06-12 VITALS
HEART RATE: 63 BPM | WEIGHT: 280 LBS | DIASTOLIC BLOOD PRESSURE: 90 MMHG | SYSTOLIC BLOOD PRESSURE: 177 MMHG | HEIGHT: 74 IN | BODY MASS INDEX: 35.94 KG/M2

## 2025-06-12 DIAGNOSIS — E11.49 TYPE II DIABETES MELLITUS WITH NEUROLOGICAL MANIFESTATIONS: ICD-10-CM

## 2025-06-12 DIAGNOSIS — L97.522 ULCER OF LEFT FOOT WITH FAT LAYER EXPOSED: Primary | ICD-10-CM

## 2025-06-12 DIAGNOSIS — Z89.412 LEFT GREAT TOE AMPUTEE: ICD-10-CM

## 2025-06-12 PROCEDURE — 3077F SYST BP >= 140 MM HG: CPT | Mod: CPTII,S$GLB,, | Performed by: PODIATRIST

## 2025-06-12 PROCEDURE — 1159F MED LIST DOCD IN RCRD: CPT | Mod: CPTII,S$GLB,, | Performed by: PODIATRIST

## 2025-06-12 PROCEDURE — 3008F BODY MASS INDEX DOCD: CPT | Mod: CPTII,S$GLB,, | Performed by: PODIATRIST

## 2025-06-12 PROCEDURE — 99999 PR PBB SHADOW E&M-EST. PATIENT-LVL IV: CPT | Mod: PBBFAC,,, | Performed by: PODIATRIST

## 2025-06-12 PROCEDURE — 3080F DIAST BP >= 90 MM HG: CPT | Mod: CPTII,S$GLB,, | Performed by: PODIATRIST

## 2025-06-12 PROCEDURE — 99213 OFFICE O/P EST LOW 20 MIN: CPT | Mod: S$GLB,,, | Performed by: PODIATRIST

## 2025-06-12 NOTE — PROGRESS NOTES
Subjective:     Patient ID: Billy Rivera is a 60 y.o. male.    Chief Complaint: Wound Care (Left foot )    Billy is a 60 y.o. male who presents to the clinic for evaluation and treatment of high risk feet. Billy has a past medical history of Allergy, CAD (coronary artery disease), native coronary artery (06/25/2013), Cancer, COVID-19 virus detected (09/01/2020), Diabetes mellitus, Diabetes mellitus, type 2, Disorder of kidney and ureter, Heart attack (04/2012), colon cancer, stage I, Hyperlipidemia, Hypertension, Muscular pain, NSTEMI May 2013 - peak troponin 0.22 (05/22/2013), MICHAELA (obstructive sleep apnea), Retinopathy due to secondary diabetes, and Uses self-applied continuous glucose monitoring device (01/03/2025). The patient's chief complaint is diabetic foot ulcer, r. This patient has documented high risk feet requiring routine maintenance secondary to diabetes mellitis and those secondary complications of diabetes, as mentioned..  Improving/no new complaints today.    Right foot has healed since previous metatarsal head resection.  Upcoming procedure for left exostectomy.  He had to cancel the most recent scheduled surgery for exostectomy however like to move forward be rescheduling.  Otherwise has been stable with his left foot.    PCP: Augie Ruiz MD    Date Last Seen by PCP:   Chief Complaint   Patient presents with    Wound Care     Left foot      Hemoglobin A1C   Date Value Ref Range Status   11/27/2024 10.2 (H) 4.0 - 5.6 % Final     Comment:     ADA Screening Guidelines:  5.7-6.4%  Consistent with prediabetes  >or=6.5%  Consistent with diabetes    High levels of fetal hemoglobin interfere with the HbA1C  assay. Heterozygous hemoglobin variants (HbS, HgC, etc)do  not significantly interfere with this assay.   However, presence of multiple variants may affect accuracy.     07/20/2023 8.7 (H) 4.0 - 5.6 % Final     Comment:     ADA Screening Guidelines:  5.7-6.4%  Consistent with  "prediabetes  >or=6.5%  Consistent with diabetes    High levels of fetal hemoglobin interfere with the HbA1C  assay. Heterozygous hemoglobin variants (HbS, HgC, etc)do  not significantly interfere with this assay.   However, presence of multiple variants may affect accuracy.     04/06/2023 9.4 (H) 4.0 - 5.6 % Final     Comment:     ADA Screening Guidelines:  5.7-6.4%  Consistent with prediabetes  >or=6.5%  Consistent with diabetes    High levels of fetal hemoglobin interfere with the HbA1C  assay. Heterozygous hemoglobin variants (HbS, HgC, etc)do  not significantly interfere with this assay.   However, presence of multiple variants may affect accuracy.         Review of Systems   Constitutional: Negative for chills, decreased appetite and fever.   Cardiovascular:  Negative for leg swelling.   Skin:  Positive for dry skin, nail changes and poor wound healing.   Musculoskeletal:  Negative for arthritis, joint pain, joint swelling and myalgias.   Gastrointestinal:  Negative for nausea and vomiting.   Neurological:  Positive for numbness, paresthesias and sensory change. Negative for loss of balance.        Objective:     Vitals:    06/12/25 0855   BP: (!) 177/90   Pulse: 63   Weight: 127 kg (279 lb 15.8 oz)   Height: 6' 2" (1.88 m)   PainSc: 0-No pain        Physical Exam  Constitutional:       Appearance: He is well-developed.   Cardiovascular:      Comments: Dorsalis pedis and posterior tibial pulses are diminished Bilaterally. Toes are cool to touch. Feet are warm proximally.There is decreased digital hair. Skin is atrophic, slightly hyperpigmented, and mildly edematous      Musculoskeletal:         General: Swelling and deformity present. No tenderness. Normal range of motion.      Comments: Adequate joint range of motion without pain, limitation, nor crepitation Bilateral feet and ankle joints. Muscle strength is 5/5 in all groups bilaterally.         Skin:     General: Skin is warm and dry.      Findings: No " ecchymosis, erythema or lesion.   Neurological:      Mental Status: He is alert and oriented to person, place, and time.      Comments: [place]-Roge 5.07 monofilamant testing is diminished Vinicio feet. Sharp/dull sensation diminished Bilaterally. Light touch absent Bilaterally.       Psychiatric:         Behavior: Behavior normal.       Wounds healed right and left foot/some callusing which was debrided.          Assessment:      Encounter Diagnoses   Name Primary?    Ulcer of left foot with fat layer exposed Yes    Left great toe amputee     Type II diabetes mellitus with neurological manifestations        Plan:     Billy was seen today for wound care.    Diagnoses and all orders for this visit:    Ulcer of left foot with fat layer exposed    Left great toe amputee    Type II diabetes mellitus with neurological manifestations        I counseled the patient on his conditions, their implications and medical management.    Independent review of patients previous clinical notes    Decision making:  Chronic illnesses discussed in detail, previous records/notes and imaging independently reviewed, prescription drug management performed in addition to lengthy discussion regarding both conservative and surgical treatment options.      The nature of the condition, options for management, as well as potential risks and complications were discussed in detail with patient. Patient was amenable to my recommendations and left my office fully informed and will follow up as instructed or sooner if necessary.      Follow-up in 4 weeks.

## 2025-06-16 ENCOUNTER — PATIENT MESSAGE (OUTPATIENT)
Dept: INTERNAL MEDICINE | Facility: CLINIC | Age: 60
End: 2025-06-16
Payer: COMMERCIAL

## 2025-06-16 DIAGNOSIS — Z79.4 TYPE 2 DIABETES MELLITUS WITHOUT COMPLICATION, WITH LONG-TERM CURRENT USE OF INSULIN: ICD-10-CM

## 2025-06-16 DIAGNOSIS — E11.9 TYPE 2 DIABETES MELLITUS WITHOUT COMPLICATION, WITH LONG-TERM CURRENT USE OF INSULIN: ICD-10-CM

## 2025-06-16 RX ORDER — PEN NEEDLE, DIABETIC 31 GX5/16"
NEEDLE, DISPOSABLE MISCELLANEOUS
Qty: 400 EACH | Refills: 3 | Status: SHIPPED | OUTPATIENT
Start: 2025-06-16

## 2025-06-16 NOTE — TELEPHONE ENCOUNTER
Refill Decision Note   Billy Rivera  is requesting a refill authorization.  Brief Assessment and Rationale for Refill:  Approve     Medication Therapy Plan:         Comments:     Note composed:3:36 PM 06/16/2025

## 2025-06-16 NOTE — TELEPHONE ENCOUNTER
Care Due:                  Date            Visit Type   Department     Provider  --------------------------------------------------------------------------------                                [facility] INTERNAL  Last Visit: 01-      FOLLOW UP    MEDICINE       Augie Ruiz  Next Visit: None Scheduled  None         None Found                                                            Last  Test          Frequency    Reason                     Performed    Due Date  --------------------------------------------------------------------------------    HBA1C.......  6 months...  metFORMIN................  11- 05-    [facility] Embedded Care Due Messages. Reference number: [id number].   6/16/2025 2:24:18 PM CDT

## 2025-06-17 ENCOUNTER — TELEPHONE (OUTPATIENT)
Dept: PHARMACY | Facility: CLINIC | Age: 60
End: 2025-06-17
Payer: COMMERCIAL

## 2025-06-23 NOTE — ADDENDUM NOTE
Addended by: ELMER BLAKELY on: 2/18/2025 10:21 AM     Modules accepted: Orders     Onset: 2 mos ago    Location / description: Pt reports 2 months of sciatic issues, has taken Motrin and Prednisone, pain continues, pain to lower back pain that radiates to L leg, no injury, has weakness to L leg, advised ED eval, pt verbalized understanding.     Precipitating Factors: as above    Pain Scale (0 - 10), 10 highest: 9/10    What improves/worsens symptoms: Nothing    Symptom specific medications: Motrin, Icy Hot, Salon Pas patches    Temperature (route and time): Denies    Recent visits (last 3-4 weeks) for same reason or recent surgery:  None    Reproductive History   LMP: N/A    Contraception: N/A    Pregnant:  No    Breastfeeding:  No    PLAN:  Go to the Emergency Department    Patient/Caller agrees to follow recommendations.    _______________________________________      Reason for Disposition   Weakness of a leg or foot (e.g., unable to bear weight, dragging foot)    Protocols used: Back Pain-A-[place]

## 2025-06-25 NOTE — PATIENT INSTRUCTIONS
"Diabetes  A1C goal <7.0%    Medications:  Increase Lantus to 24 units nightly  Increase Novolog to 7 units with meals. Add correction scale if needed.  Blood sugar 150 to 200 add 2 units  Blood sugar 201 to 250 add 4 units  Blood sugar 251 to 300 add 6 units  Blood sugar 301 to 350 add 8 units  Blood sugar greater than 350 add 10 units    Continue Metformin 1000 mg twice daily    Cholesterol  LDL goal is less than 70  Continue  Atorvastatin 80 mg daily    High cholesterol foods to avoid/limit:     C: Cheese, milk, dairy  A: Animal Fats: hot dogs and hamburgers  G: "Getting it away from home": going out to eat a lot  E: Extra Fat: cookies, candy, and sweets  F: Family History    Remember high cholesterol can clog your arteries and increase risk for heart attack or stroke.     Blood Pressure  Blood pressure goal is less than 130/80  Continue Carvedilol 12.5 mg daily    "

## 2025-06-25 NOTE — PROGRESS NOTES
Subjective:      Patient ID: Billy Rivera is a 60 y.o. male.    Chief Complaint:  Diabetes and Follow-up    History of Present Illness  Billy Rivera is a 60 y.o. male with history significant for T2DM, HTN, CAD, HLD, colon cancer, obesity, history of diabetic foot ulcer and osteomyelitis s/p left toe amputation and is here for evaluation and management of T2DM.  Previously seen by TYRESE Hughes DNP.  Last seen 2022 when he was admitted for diabetic toe ulcer requiring amputation.  This is their first visit with me.      With regards to type 2 diabetes:    Diagnosed: - fatigue and hyperglycemia >600  FH: paternal and maternal grandparents, mother, father with pre-DM  Known complications:  DKA denies  RN +, foggy vision  Eye Exam: : 2025, injections every 6 weeks- vision improving   PN +, history of toe amputations bilaterally   Foot exam: : 04/15/2025  Nephropathy denies  CAD +  Denies history of pancreatitis & personal/family history of medullary thyroid cancer.   Denies history of frequent UTI and/or yeast infection.    Current regimen:  Lantus 20 units nightly   Novolog 5 units with meals. Sometimes gives extra when hyperglycemic but depends on time of day, activity, and meal   Metformin 1000 mg BID    Missed doses: occasionally   Rotating injection sites. Denies contusions or lipohypertrophy.  Bolus timin minutes before meals, occasionally 1 minute before meals or 20 minute after meals    Other medications tried:  Farxiga- insurance   Trulicity- insurance, tolerated  Ozempic- did not start due to insurance   Medtronic 770G- costly    - Basal 1.9 units/hr   - ICR: 1:56 (patient uses pre-set bolus parameters 5 units for snack; 10 units for small meals; 15 units for large meals)   - SF 1:25   - Target B-120 mg/dL   - IOB: 4 hours    Glucose Monitor: Dexcom G7  >4 times a day testing  Log reviewed: report scanned in media      Hypoglycemia:  Denies  Dexcom low set at 150-  "will correct with half a cheese sandwich if <150.  BG 40 3-4 months ago- did not recall circumstance  Knows how to correct with 15 grams of carbs- juice, coke, or a peppermint.     Symptoms:  + polyuria   Denies polydipsia  Denies unintentional/unexplained weight changes  + vision changes  Denies fatigue  + snoring  + MICHAELA: denies CPAP use     Diet/Exercise:  Eats 2-3 meals a day- depends on what's going on. Some days will go to [employer] with diet coke or tea or [employer]. Some cooking at home but he and his wife both work.  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 Heterozygous hemoglobin variants (HbS, HgC, etc)do  not significantly interfere with this assay.   However, presence of multiple variants may affect accuracy.     07/20/2023 8.7 (H) 4.0 - 5.6 % Final     Comment:     ADA Screening Guidelines:  5.7-6.4%  Consistent with prediabetes  >or=6.5%  Consistent with diabetes    High levels of fetal hemoglobin interfere with the HbA1C  assay. Heterozygous hemoglobin variants (HbS, HgC, etc)do  not significantly interfere with this assay.   However, presence of multiple variants may affect accuracy.     04/06/2023 9.4 (H) 4.0 - 5.6 % Final     Comment:     ADA Screening Guidelines:  5.7-6.4%  Consistent with prediabetes  >or=6.5%  Consistent with diabetes    High levels of fetal hemoglobin interfere with the HbA1C  assay. Heterozygous hemoglobin variants (HbS, HgC, etc)do  not significantly interfere with this assay. "   However, presence of multiple variants may affect accuracy.         Statin: Taking Atorvastatin 80 mg daily  ACE/ARB: Not taking. Taking Carvedilol 12.5 mg daily  Screening or Prevention Patient's value Goal Complete/Controlled?   HgA1C Testing and Control   Lab Results   Component Value Date    HGBA1C 10.2 (H) 11/27/2024      Annually/Less than 8% No   Lipid profile : 11/27/2024 Annually Yes   LDL control Lab Results   Component Value Date    LDLCALC 123.0 11/27/2024    Annually/Less than 100 mg/dl  No   Nephropathy screening Lab Results   Component Value Date    LABMICR 1381.0 11/27/2024     Lab Results   Component Value Date    PROTEINUA 2+ (A) 12/12/2024    Annually Yes   Blood pressure BP Readings from Last 1 Encounters:   07/16/25 130/82    Less than 140/90 No   Dilated retinal exam : 07/01/2025 Annually Yes   Foot exam   : 04/15/2025 Annually Yes     FIB-4 Calculation: 1.61 at 1/24/2025 12:27 PM  Calculated from:  SGOT/AST: 19 U/L at 1/24/2025 12:27 PM  SGPT/ALT: 9 U/L at 1/24/2025 12:27 PM  Platelets: 236 K/uL at 1/24/2025 12:27 PM  Age: 59 years     FIB-4 below 1.30 is considered as low-risk for advanced fibrosis  FIB-4 over 2.67 is considered as high-risk for advanced fibrosis  FIB-4 values between 1.30 and 2.67 are considered as intermediate-risk of advanced fibrosis for ages 36-64.     For ages > 64 the cut-off for low-risk goes to < 2.  This is a screening tool and clinical judgement should be used in the interpretation of these results.    Kidney Failure Risk Equation (Tangri)    Kidney Failure Risk at 2 years: Unavailable    Kidney Failure Risk at 5 years: Unavailable    Lab Results   Component Value Date    MICALBCREAT 1394.9 (H) 11/27/2024    CREATININE 0.9 01/24/2025       Review of Systems  As above  Objective:   Physical Exam  Constitutional:       Appearance: Normal appearance. He is obese.   Cardiovascular:      Rate and Rhythm: Normal rate.      Comments: No edema  Pulmonary:      Effort:  "Pulmonary effort is normal.   Neurological:      Mental Status: He is alert.   Psychiatric:         Mood and Affect: Mood normal.         Behavior: Behavior normal.         Visit Vitals  /82 (BP Location: Right arm, Patient Position: Sitting)   Pulse 70   Ht 6' 2" (1.88 m)   Wt 125.3 kg (276 lb 3.8 oz)   SpO2 98%   BMI 35.47 kg/m²       Body mass index is 35.47 kg/m².    Lab Review:   Lab Results   Component Value Date    HGBA1C 10.2 (H) 11/27/2024    HGBA1C 8.7 (H) 07/20/2023    HGBA1C 9.4 (H) 04/06/2023       Lab Results   Component Value Date    CHOL 220 (H) 11/27/2024    HDL 35 (L) 11/27/2024    LDLCALC 123.0 11/27/2024    TRIG 310 (H) 11/27/2024    CHOLHDL 15.9 (L) 11/27/2024     Lab Results   Component Value Date     01/24/2025    K 3.8 01/24/2025     01/24/2025    CO2 23 01/24/2025     (H) 01/24/2025    BUN 16 01/24/2025    CREATININE 0.9 01/24/2025    CALCIUM 8.8 01/24/2025    PROT 6.9 01/24/2025    ALBUMIN 3.0 (L) 01/24/2025    BILITOT 0.3 01/24/2025    ALKPHOS 113 01/24/2025    AST 19 01/24/2025    ALT 9 (L) 01/24/2025    ANIONGAP 13 01/24/2025    ESTGFRAFRICA >60.0 06/10/2022    EGFRNONAA >60.0 06/10/2022    TSH 1.990 01/26/2022     No results found for: "[id number]"  Assessment and Plan     1. 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 Type 2 diabetes mellitus with hyperglycemia, with long-term current use of insulin  insulin aspart U-100 (NOVOLOG FLEXPEN U-100 INSULIN) 100 unit/mL (3 mL) InPn pen    insulin glargine U-100, Lantus, (LANTUS SOLOSTAR U-100 INSULIN) 100 unit/mL (3 mL) InPn pen    metFORMIN (GLUCOPHAGE) 1000 MG tablet    Ambulatory referral/consult to Diabetes Education    Comprehensive Metabolic Panel    Hemoglobin A1C    Lipid Panel    Microalbumin/Creatinine Ratio, Urine      3. Class 2 severe obesity with body mass index (BMI) " of 35 to 39.9 with serious comorbidity        4. Onychomycosis of multiple toenails with type 2 diabetes mellitus        5. Hypertension associated with diabetes        6. 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 Global hyperglycemia. No hypoglycemia noted   - Will try for GLP1 agonist for diabetes and weight loss benefit   - Will try for SGLT2 inhibitor due to +microalbuminuria   - If medications approved, will titrate insulin as needed   - Referral to Diabetes Education for lifestyle management    Medication:    - Increase Lantus to 24 units nightly   - Increase Novolog to 7 units with meals + MDC (150/25)   - Continue Metformin 1000 mg twice daily   - Resume Farxiga 5 mg daily or start Jardiance 10 mg daily pending insurance approval   - Start Mounjaro 2.5 mg weekly pending insurance approval     - Reviewed goals of therapy are to get the best control we can without hypoglycemia.   - Reviewed patient's current insulin regimen. Clarified proper insulin dose and timing in relation to meals, etc. Insulin injection sites and proper rotation instructed.     - Advised frequent self blood glucose monitoring.  Patient encouraged to document glucose results and bring them to every clinic visit.   - Hypoglycemia precautions discussed. Instructed on precautions before driving.     - Call for Bg repeatedly < 70 or > 180.    - Close adherence to lifestyle changes recommended.    - Periodic follow ups for eye evaluations, foot care and dental care suggested.      Class 2 severe obesity with body mass index (BMI) of 35 to 39.9 with serious comorbidity    - Encouraged dietary and lifestyle modifications.   - Emphasized weight loss goals.   - Start GLP1 agonist    Onychomycosis of multiple toenails with type 2 diabetes mellitus   - Managed by podiatry   - Optimize blood glucose control      Hypertension associated with diabetes   - Controlled.   - Blood pressure goals discussed with patient.      Dyslipidemia associated with type 2 diabetes mellitus   - LDL goal <70   - Not controlled   - On statin per ADA recommendations      Follow up in about 3 months (around 10/16/2025).    Visit today included increased complexity associated with the care of the problems addressed and managing the longitudinal care of the patient due to the serious and/or complex managed problems.    Shari Nichols PA-C

## 2025-06-26 ENCOUNTER — OFFICE VISIT (OUTPATIENT)
Dept: PODIATRY | Facility: CLINIC | Age: 60
End: 2025-06-26
Payer: COMMERCIAL

## 2025-06-26 VITALS
BODY MASS INDEX: 35.94 KG/M2 | HEART RATE: 46 BPM | SYSTOLIC BLOOD PRESSURE: 168 MMHG | HEIGHT: 74 IN | DIASTOLIC BLOOD PRESSURE: 89 MMHG | WEIGHT: 280 LBS

## 2025-06-26 DIAGNOSIS — E11.49 TYPE II DIABETES MELLITUS WITH NEUROLOGICAL MANIFESTATIONS: ICD-10-CM

## 2025-06-26 DIAGNOSIS — L97.522 ULCER OF LEFT FOOT WITH FAT LAYER EXPOSED: Primary | ICD-10-CM

## 2025-06-26 PROCEDURE — 87075 CULTR BACTERIA EXCEPT BLOOD: CPT | Performed by: PODIATRIST

## 2025-06-26 PROCEDURE — 99999 PR PBB SHADOW E&M-EST. PATIENT-LVL IV: CPT | Mod: PBBFAC,,, | Performed by: PODIATRIST

## 2025-06-26 PROCEDURE — 3079F DIAST BP 80-89 MM HG: CPT | Mod: CPTII,S$GLB,, | Performed by: PODIATRIST

## 2025-06-26 PROCEDURE — 87070 CULTURE OTHR SPECIMN AEROBIC: CPT | Performed by: PODIATRIST

## 2025-06-26 PROCEDURE — 3008F BODY MASS INDEX DOCD: CPT | Mod: CPTII,S$GLB,, | Performed by: PODIATRIST

## 2025-06-26 PROCEDURE — 1160F RVW MEDS BY RX/DR IN RCRD: CPT | Mod: CPTII,S$GLB,, | Performed by: PODIATRIST

## 2025-06-26 PROCEDURE — 99214 OFFICE O/P EST MOD 30 MIN: CPT | Mod: S$GLB,,, | Performed by: PODIATRIST

## 2025-06-26 PROCEDURE — 3077F SYST BP >= 140 MM HG: CPT | Mod: CPTII,S$GLB,, | Performed by: PODIATRIST

## 2025-06-26 PROCEDURE — 1159F MED LIST DOCD IN RCRD: CPT | Mod: CPTII,S$GLB,, | Performed by: PODIATRIST

## 2025-06-26 PROCEDURE — 87102 FUNGUS ISOLATION CULTURE: CPT | Performed by: PODIATRIST

## 2025-06-26 NOTE — PROGRESS NOTES
Subjective:     Patient ID: Billy Rivera is a 60 y.o. male.    Chief Complaint: Foot Problem (Possible toe infection/Lasting for?/Trauma?)    Billy is a 60 y.o. male who presents to the clinic for evaluation and treatment of high risk feet. Billy has a past medical history of Allergy, CAD (coronary artery disease), native coronary artery (06/25/2013), Cancer, COVID-19 virus detected (09/01/2020), Diabetes mellitus, Diabetes mellitus, type 2, Disorder of kidney and ureter, Heart attack (04/2012), colon cancer, stage I, Hyperlipidemia, Hypertension, Muscular pain, NSTEMI May 2013 - peak troponin 0.22 (05/22/2013), MICHAELA (obstructive sleep apnea), Retinopathy due to secondary diabetes, and Uses self-applied continuous glucose monitoring device (01/03/2025). The patient's chief complaint is recurrence of LEFT plantar foot ulcer.  Was being followed by Dr. Melara.  Patient started using Iodosorb once he noted the wound and bandaged the area himself until he could come in.  He reports no pain to the area.    No fevers, chills, or nausea or vomiting reported.       PCP: Augie Ruiz MD    Last PCP Visit: 1/2/2025       Problem List[1]      Medications Ordered Prior to Encounter[2]      Review of patient's allergies indicates:   Allergen Reactions    Penicillins Other (See Comments)     PCN allergy as a child - was told he went into a coma. Tolerates Cefazolin without adverse reactions    Shellfish containing products      Other reaction(s): Unknown    Vancomycin Itching     Tolerated vancomycin 7/2020    Bactrim  [sulfamethoxazole-trimethoprim] Rash       Hemoglobin A1C   Date Value Ref Range Status   11/27/2024 10.2 (H) 4.0 - 5.6 % Final     Comment:     ADA Screening Guidelines:  5.7-6.4%  Consistent with prediabetes  >or=6.5%  Consistent with diabetes    High levels of fetal hemoglobin interfere with the HbA1C  assay. Heterozygous hemoglobin variants (HbS, HgC, etc)do  not significantly interfere with  "this assay.   However, presence of multiple variants may affect accuracy.     07/20/2023 8.7 (H) 4.0 - 5.6 % Final     Comment:     ADA Screening Guidelines:  5.7-6.4%  Consistent with prediabetes  >or=6.5%  Consistent with diabetes    High levels of fetal hemoglobin interfere with the HbA1C  assay. Heterozygous hemoglobin variants (HbS, HgC, etc)do  not significantly interfere with this assay.   However, presence of multiple variants may affect accuracy.     04/06/2023 9.4 (H) 4.0 - 5.6 % Final     Comment:     ADA Screening Guidelines:  5.7-6.4%  Consistent with prediabetes  >or=6.5%  Consistent with diabetes    High levels of fetal hemoglobin interfere with the HbA1C  assay. Heterozygous hemoglobin variants (HbS, HgC, etc)do  not significantly interfere with this assay.   However, presence of multiple variants may affect accuracy.         Review of Systems   Constitutional: Negative for chills, decreased appetite and fever.   Cardiovascular:  Negative for leg swelling.   Skin:  Positive for dry skin, nail changes and poor wound healing.   Musculoskeletal:  Negative for arthritis, joint pain, joint swelling and myalgias.   Gastrointestinal:  Negative for nausea and vomiting.   Neurological:  Positive for numbness, paresthesias and sensory change. Negative for loss of balance.        Objective:     Vitals:    06/26/25 1411   BP: (!) 168/89   Pulse: (!) 46   Weight: 127 kg (279 lb 15.8 oz)   Height: 6' 2" (1.88 m)   PainSc: 0-No pain          Physical Exam  Constitutional:       Appearance: He is well-developed.   Cardiovascular:      Comments: Dorsalis pedis and posterior tibial pulses are diminished Bilaterally. Toes are cool to touch. Feet are warm proximally.There is decreased digital hair. Skin is atrophic, slightly hyperpigmented, and mildly edematous      Musculoskeletal:         General: Swelling and deformity present. No tenderness. Normal range of motion.      Comments: Adequate joint range of motion " without pain, limitation, nor crepitation Bilateral feet and ankle joints. Muscle strength is 5/5 in all groups bilaterally.         Skin:     General: Skin is warm and dry.      Findings: No ecchymosis, erythema or lesion.     Ulcer Location: LEFT plantar foot   Measurements:   1.5cm x 1.2cm x 0.2cm  Periwound: (+) hyperkeratosis;  (--) necrosis  Exudate: fibrinous  Edema:  mild  Malodor:  Present  Base:  90% granular; 10% fibrin.  0%  Eschar  Erythema:  none  Probe to bone: no       Neurological:      Mental Status: He is alert and oriented to person, place, and time.      Comments: Ceresco-Roge 5.07 monofilamant testing is diminished Vinicio feet. Sharp/dull sensation diminished Bilaterally. Light touch absent Bilaterally.           Assessment:      Problem List Items Addressed This Visit    None  Visit Diagnoses         Ulcer of left foot with fat layer exposed    -  Primary    Relevant Orders    Aerobic culture (Completed)    Culture, Anaerobic (Completed)    Fungus culture (Completed)      Type II diabetes mellitus with neurological manifestations                  Plan:     Billy was seen today for foot problem.    Diagnoses and all orders for this visit:    Ulcer of left foot with fat layer exposed  -     Aerobic culture  -     Culture, Anaerobic  -     Fungus culture    Type II diabetes mellitus with neurological manifestations        I counseled the patient on his conditions, their implications and medical management  Independent review of patients previous clinical notes  Decision making:  Chronic illnesses discussed in detail, previous records/notes and imaging independently reviewed, prescription drug management performed in addition to lengthy discussion regarding both conservative and surgical treatment options.  Recurrence of chronic condition.  Wound care today.   Cultures.   Football dressing.  Follow up one week or sooner if concerned.    I spent a total of 30 minutes on the day of the  visit.  This includes face to face time and non-face to face time preparing to see the patient (eg, review of tests), obtaining and/or reviewing separately obtained history, documenting clinical information in the electronic or other health record, independently interpreting results and communicating results to the patient/family/caregiver, or care coordinator.                       [1]   Patient Active Problem List  Diagnosis    Essential hypertension    Coronary artery disease involving native coronary artery of native heart without angina pectoris    BDR (background diabetic retinopathy) - Both Eyes    Type 2 diabetes mellitus with hyperglycemia, with long-term current use of insulin    Proteinuria    Edema    Hypertension associated with diabetes    Type 2 diabetes mellitus with diabetic peripheral angiopathy without gangrene, with long-term current use of insulin    Onychomycosis of multiple toenails with type 2 diabetes mellitus    Soft tissue infection    Hyponatremia    Anemia    Hypomagnesemia    Diabetic foot ulcer associated with type 2 diabetes mellitus    Insomnia    Hx of colon cancer, stage I    Dyslipidemia associated with type 2 diabetes mellitus    Diabetes mellitus with insulin therapy    Obesity, diabetes, and hypertension syndrome    Class 2 severe obesity with body mass index (BMI) of 35 to 39.9 with serious comorbidity    Diabetes mellitus with microalbuminuria    Diabetes mellitus with peripheral circulatory disorder    Pyogenic inflammation of bone    Diabetic foot infection    Diabetic foot ulcer associated with diabetes mellitus due to underlying condition    Amputation of toe of left foot    Subacute osteomyelitis of right foot    CAD (coronary artery disease)    Ulcer of right foot with fat layer exposed    Right foot pain    Ulcer of toe, right, with necrosis of bone    Acquired absence of other right toe(s)    Other chronic osteomyelitis, right ankle and foot    Obesity    JOVANNI (acute  "kidney injury)    Bacterial infection due to Staphylococcus    Uses self-applied continuous glucose monitoring device    Type 2 diabetes mellitus with both eyes affected by proliferative retinopathy and macular edema, with long-term current use of insulin    Vitreous hemorrhage, bilateral    Type 2 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      aspirin (ECOTRIN) 81 MG EC tablet Take 1 tablet (81 mg total) by mouth once daily. 90 tablet 3    atorvastatin (LIPITOR) 80 MG tablet Take 1 tablet (80 mg total) by mouth every evening. 30 tablet 6    BD ULTRA-FINE MINI PEN NEEDLE 31 gauge x 3/16" Ndle USE 1 PEN NEEDLE  4 TIMES DAILY 400 each 3    bismuth subsalicylate (PEPTO BISMOL) 262 mg/15 mL suspension Take 15 mLs by mouth daily as needed (diarrhea).      blood sugar diagnostic (ACCU-CHEK GUIDE TEST STRIPS) Strp 1 each by Misc.(Non-Drug; Combo Route) route 5 (five) times daily. 150 each 11    blood-glucose sensor (DEXCOM G7 SENSOR) Haley 1 Device by Misc.(Non-Drug; Combo Route) route every 10 days. 3 each 11    carvediloL (COREG) 12.5 MG tablet TAKE 1/2 (ONE-HALF) TABLET BY MOUTH TWICE DAILY WITH MEALS 90 tablet 1    doxycycline (VIBRAMYCIN) 100 MG Cap Take 1 capsule (100 mg total) by mouth 2 (two) times daily. 14 capsule 0    insulin aspart U-100 (NOVOLOG FLEXPEN U-100 INSULIN) 100 unit/mL (3 mL) InPn pen Inject 10 Units into the skin 3 (three) times daily with meals. 27 mL 0    insulin glargine U-100, Lantus, (LANTUS SOLOSTAR U-100 INSULIN) 100 unit/mL (3 mL) InPn pen Inject 20 Units into the skin every evening. 15 mL 2    lancets (ACCU-CHEK FASTCLIX LANCET DRUM) Misc 1 each by Misc.(Non-Drug; Combo Route) route Daily. " 30 each 11    metFORMIN (GLUCOPHAGE) 1000 MG tablet TAKE 1 TABLET BY MOUTH TWICE DAILY WITH MEALS 180 tablet 0    multivit-min/folic/vit K/lycop (MEN'S MULTIVITAMIN ORAL) Take 1 tablet by mouth once daily.      ondansetron (ZOFRAN) 4 MG tablet Take 1 tablet (4 mg total) by mouth every 8 (eight) hours as needed for Nausea. 12 tablet 0    oxyCODONE-acetaminophen (PERCOCET) 5-325 mg per tablet Take 1 tablet by mouth every 6 (six) hours as needed for Pain. 12 tablet 0    oxyCODONE-acetaminophen (PERCOCET) 7.5-325 mg per tablet Take 1 tablet by mouth every 6 (six) hours as needed.      semaglutide (OZEMPIC) 0.25 mg or 0.5 mg (2 mg/3 mL) pen injector Inject 0.25 mg into the skin every 7 days. 3 mL 1     No current facility-administered medications on file prior to visit.

## 2025-06-30 LAB — BACTERIA SPEC AEROBE CULT: ABNORMAL

## 2025-07-01 ENCOUNTER — CLINICAL SUPPORT (OUTPATIENT)
Dept: OPHTHALMOLOGY | Facility: CLINIC | Age: 60
End: 2025-07-01
Payer: COMMERCIAL

## 2025-07-01 ENCOUNTER — OFFICE VISIT (OUTPATIENT)
Dept: OPHTHALMOLOGY | Facility: CLINIC | Age: 60
End: 2025-07-01
Payer: COMMERCIAL

## 2025-07-01 DIAGNOSIS — Z79.4 TYPE 2 DIABETES MELLITUS WITH LEFT EYE AFFECTED BY PROLIFERATIVE RETINOPATHY AND TRACTION RETINAL DETACHMENT NOT INVOLVING MACULA, WITH LONG-TERM CURRENT USE OF INSULIN: ICD-10-CM

## 2025-07-01 DIAGNOSIS — Z79.4 TYPE 2 DIABETES MELLITUS WITH BOTH EYES AFFECTED BY PROLIFERATIVE RETINOPATHY AND MACULAR EDEMA, WITH LONG-TERM CURRENT USE OF INSULIN: Primary | ICD-10-CM

## 2025-07-01 DIAGNOSIS — E11.3532 TYPE 2 DIABETES MELLITUS WITH LEFT EYE AFFECTED BY PROLIFERATIVE RETINOPATHY AND TRACTION RETINAL DETACHMENT NOT INVOLVING MACULA, WITH LONG-TERM CURRENT USE OF INSULIN: ICD-10-CM

## 2025-07-01 DIAGNOSIS — E11.3513 TYPE 2 DIABETES MELLITUS WITH BOTH EYES AFFECTED BY PROLIFERATIVE RETINOPATHY AND MACULAR EDEMA, WITH LONG-TERM CURRENT USE OF INSULIN: Primary | ICD-10-CM

## 2025-07-01 LAB
BACTERIA SPEC ANAEROBE CULT: ABNORMAL
BACTERIA SPEC ANAEROBE CULT: ABNORMAL
FUNGUS SPEC CULT: NORMAL

## 2025-07-01 PROCEDURE — 99999 PR PBB SHADOW E&M-EST. PATIENT-LVL III: CPT | Mod: PBBFAC,,, | Performed by: OPHTHALMOLOGY

## 2025-07-01 RX ADMIN — Medication 1.25 MG: at 10:07

## 2025-07-01 NOTE — PROGRESS NOTES
HPI     Follow-up     Additional comments: 6 week Avastin ou           Comments    59 yo male here today with c/o foggy VA OD since last visit, small   floaters in VA OD, no flashes ou and denies pain ou          Last edited by Mandy Rivera on 7/1/2025  9:01 AM.               OCT - OD preretinal heme - improving  OS  DME      A/P    PDR OU  T2 uncontrolled with insulin    2. VH OU  Preretinal heme OD  OS with diffuse VH  S/p Avastin OU x 3    Will need PRP OD - as heme continuing to improve      S/p 25g PPV/EL/partial AFx/Avastin OS 4/9/25    3. DME OS>OD    Avastin OU today    Get approval for Ozurdex OS      4.  HTN Ret OU  BS/BP/chol control    5. NS OU      6 weeks  OCT and dilate OD      Risks, benefits, and alternatives to treatment discussed in detail with the patient.  The patient voiced understanding and wished to proceed with the procedure    Injection Procedure Note:  Diagnosis: PDR with VH OD, DME OS    Patient Identified and Time Out complete  Pt marked  Topical Proparacaine and Betadine.  Inject Avastin OU at 6:00 @ 3.5-4mm posterior to limbus  Post Operative Dx: Same  Complications: None  Follow up as above.

## 2025-07-04 RX ORDER — METRONIDAZOLE 500 MG/1
500 TABLET ORAL EVERY 12 HOURS
Qty: 14 TABLET | Refills: 0 | Status: SHIPPED | OUTPATIENT
Start: 2025-07-04

## 2025-07-04 RX ORDER — CIPROFLOXACIN 500 MG/1
500 TABLET, FILM COATED ORAL EVERY 12 HOURS
Qty: 14 TABLET | Refills: 0 | Status: SHIPPED | OUTPATIENT
Start: 2025-07-04 | End: 2025-07-11

## 2025-07-09 ENCOUNTER — TELEPHONE (OUTPATIENT)
Dept: PHARMACY | Facility: CLINIC | Age: 60
End: 2025-07-09
Payer: COMMERCIAL

## 2025-07-09 NOTE — TELEPHONE ENCOUNTER
[facility]/Population Health Chart Review & Patient Outreach Details For Medication Adherence Project    Reason for Outreach Encounter: 3rd Party payor non-compliance report ([employer], [employer], C, etc)  2.  Patient Outreach Method: Reviewed patient chart   3.   Medication in question:    Hyperlipidemia Medications              atorvastatin (LIPITOR) 80 MG tablet Take 1 tablet (80 mg total) by mouth every evening.                  atorvastatin   last filled  11/30/24 for 90 day supply    4.  Reviewed and or Updates Made To: Patient Chart  5. Outreach Outcomes and/or actions taken: Sent inquiry to patient: Waiting for response  Additional Notes:

## 2025-07-14 ENCOUNTER — OFFICE VISIT (OUTPATIENT)
Dept: PODIATRY | Facility: CLINIC | Age: 60
End: 2025-07-14
Payer: COMMERCIAL

## 2025-07-14 VITALS
HEIGHT: 74 IN | HEART RATE: 55 BPM | BODY MASS INDEX: 35.95 KG/M2 | DIASTOLIC BLOOD PRESSURE: 82 MMHG | SYSTOLIC BLOOD PRESSURE: 146 MMHG

## 2025-07-14 DIAGNOSIS — L84 CORN OR CALLUS: ICD-10-CM

## 2025-07-14 DIAGNOSIS — E11.49 TYPE II DIABETES MELLITUS WITH NEUROLOGICAL MANIFESTATIONS: ICD-10-CM

## 2025-07-14 DIAGNOSIS — L97.522 ULCER OF LEFT FOOT WITH FAT LAYER EXPOSED: Primary | ICD-10-CM

## 2025-07-14 DIAGNOSIS — Z89.412 LEFT GREAT TOE AMPUTEE: ICD-10-CM

## 2025-07-14 PROCEDURE — 3077F SYST BP >= 140 MM HG: CPT | Mod: CPTII,S$GLB,, | Performed by: PODIATRIST

## 2025-07-14 PROCEDURE — 99213 OFFICE O/P EST LOW 20 MIN: CPT | Mod: S$GLB,,, | Performed by: PODIATRIST

## 2025-07-14 PROCEDURE — 3079F DIAST BP 80-89 MM HG: CPT | Mod: CPTII,S$GLB,, | Performed by: PODIATRIST

## 2025-07-14 PROCEDURE — 1160F RVW MEDS BY RX/DR IN RCRD: CPT | Mod: CPTII,S$GLB,, | Performed by: PODIATRIST

## 2025-07-14 PROCEDURE — 3008F BODY MASS INDEX DOCD: CPT | Mod: CPTII,S$GLB,, | Performed by: PODIATRIST

## 2025-07-14 PROCEDURE — 1159F MED LIST DOCD IN RCRD: CPT | Mod: CPTII,S$GLB,, | Performed by: PODIATRIST

## 2025-07-14 PROCEDURE — 99999 PR PBB SHADOW E&M-EST. PATIENT-LVL IV: CPT | Mod: PBBFAC,,, | Performed by: PODIATRIST

## 2025-07-14 NOTE — PROGRESS NOTES
Subjective:     Patient ID: Billy Rivera is a 60 y.o. male.    Chief Complaint: Diabetes Mellitus and Wound Care (Left foot)    Billy is a 60 y.o. male who presents to the clinic for evaluation and treatment of high risk feet. Billy has a past medical history of Allergy, CAD (coronary artery disease), native coronary artery (06/25/2013), Cancer, COVID-19 virus detected (09/01/2020), Diabetes mellitus, Diabetes mellitus, type 2, Disorder of kidney and ureter, Heart attack (04/2012), colon cancer, stage I, Hyperlipidemia, Hypertension, Muscular pain, NSTEMI May 2013 - peak troponin 0.22 (05/22/2013), MICHAELA (obstructive sleep apnea), Retinopathy due to secondary diabetes, and Uses self-applied continuous glucose monitoring device (01/03/2025). The patient's chief complaint is diabetic foot ulcer, r. This patient has documented high risk feet requiring routine maintenance secondary to diabetes mellitis and those secondary complications of diabetes, as mentioned..  Improving/no new complaints today.    Right foot has healed since previous metatarsal head resection.  Upcoming procedure for left exostectomy.    PCP: Augie Ruiz MD    Date Last Seen by PCP:   Chief Complaint   Patient presents with    Diabetes Mellitus    Wound Care     Left foot     Hemoglobin A1C   Date Value Ref Range Status   11/27/2024 10.2 (H) 4.0 - 5.6 % Final     Comment:     ADA Screening Guidelines:  5.7-6.4%  Consistent with prediabetes  >or=6.5%  Consistent with diabetes    High levels of fetal hemoglobin interfere with the HbA1C  assay. Heterozygous hemoglobin variants (HbS, HgC, etc)do  not significantly interfere with this assay.   However, presence of multiple variants may affect accuracy.     07/20/2023 8.7 (H) 4.0 - 5.6 % Final     Comment:     ADA Screening Guidelines:  5.7-6.4%  Consistent with prediabetes  >or=6.5%  Consistent with diabetes    High levels of fetal hemoglobin interfere with the HbA1C  assay.  "Heterozygous hemoglobin variants (HbS, HgC, etc)do  not significantly interfere with this assay.   However, presence of multiple variants may affect accuracy.     04/06/2023 9.4 (H) 4.0 - 5.6 % Final     Comment:     ADA Screening Guidelines:  5.7-6.4%  Consistent with prediabetes  >or=6.5%  Consistent with diabetes    High levels of fetal hemoglobin interfere with the HbA1C  assay. Heterozygous hemoglobin variants (HbS, HgC, etc)do  not significantly interfere with this assay.   However, presence of multiple variants may affect accuracy.         Review of Systems   Constitutional: Negative for chills, decreased appetite and fever.   Cardiovascular:  Negative for leg swelling.   Skin:  Positive for dry skin, nail changes and poor wound healing.   Musculoskeletal:  Negative for arthritis, joint pain, joint swelling and myalgias.   Gastrointestinal:  Negative for nausea and vomiting.   Neurological:  Positive for numbness, paresthesias and sensory change. Negative for loss of balance.        Objective:     Vitals:    07/14/25 0815   BP: (!) 146/82   Pulse: (!) 55   Height: 6' 2" (1.88 m)   PainSc: 0-No pain        Physical Exam  Constitutional:       Appearance: He is well-developed.   Cardiovascular:      Comments: Dorsalis pedis and posterior tibial pulses are diminished Bilaterally. Toes are cool to touch. Feet are warm proximally.There is decreased digital hair. Skin is atrophic, slightly hyperpigmented, and mildly edematous      Musculoskeletal:         General: Swelling and deformity present. No tenderness. Normal range of motion.      Comments: Adequate joint range of motion without pain, limitation, nor crepitation Bilateral feet and ankle joints. Muscle strength is 5/5 in all groups bilaterally.         Skin:     General: Skin is warm and dry.      Findings: No ecchymosis, erythema or lesion.   Neurological:      Mental Status: He is alert and oriented to person, place, and time.      Comments: " Yoder-Roge 5.07 monofilamant testing is diminished Vinicio feet. Sharp/dull sensation diminished Bilaterally. Light touch absent Bilaterally.       Psychiatric:         Behavior: Behavior normal.       Wounds healed right and left foot/some callusing which was debrided.          Assessment:      Encounter Diagnoses   Name Primary?    Ulcer of left foot with fat layer exposed Yes    Type II diabetes mellitus with neurological manifestations     Corn or callus     Left great toe amputee        Plan:     Billy was seen today for diabetes mellitus and wound care.    Diagnoses and all orders for this visit:    Ulcer of left foot with fat layer exposed    Type II diabetes mellitus with neurological manifestations    Corn or callus    Left great toe amputee        I counseled the patient on his conditions, their implications and medical management.    Independent review of patients previous clinical notes    Decision making:  Chronic illnesses discussed in detail, previous records/notes and imaging independently reviewed, prescription drug management performed in addition to lengthy discussion regarding both conservative and surgical treatment options.      The nature of the condition, options for management, as well as potential risks and complications were discussed in detail with patient. Patient was amenable to my recommendations and left my office fully informed and will follow up as instructed or sooner if necessary.      Follow-up in 4 weeks.

## 2025-07-16 ENCOUNTER — OFFICE VISIT (OUTPATIENT)
Dept: ENDOCRINOLOGY | Facility: CLINIC | Age: 60
End: 2025-07-16
Payer: COMMERCIAL

## 2025-07-16 VITALS
BODY MASS INDEX: 35.45 KG/M2 | HEIGHT: 74 IN | SYSTOLIC BLOOD PRESSURE: 130 MMHG | HEART RATE: 70 BPM | DIASTOLIC BLOOD PRESSURE: 82 MMHG | WEIGHT: 276.25 LBS | OXYGEN SATURATION: 98 %

## 2025-07-16 DIAGNOSIS — E11.69 ONYCHOMYCOSIS OF MULTIPLE TOENAILS WITH TYPE 2 DIABETES MELLITUS: ICD-10-CM

## 2025-07-16 DIAGNOSIS — E78.5 DYSLIPIDEMIA ASSOCIATED WITH TYPE 2 DIABETES MELLITUS: ICD-10-CM

## 2025-07-16 DIAGNOSIS — I15.2 HYPERTENSION ASSOCIATED WITH DIABETES: Chronic | ICD-10-CM

## 2025-07-16 DIAGNOSIS — B35.1 ONYCHOMYCOSIS OF MULTIPLE TOENAILS WITH TYPE 2 DIABETES MELLITUS: ICD-10-CM

## 2025-07-16 DIAGNOSIS — E11.59 HYPERTENSION ASSOCIATED WITH DIABETES: Chronic | ICD-10-CM

## 2025-07-16 DIAGNOSIS — Z79.4 TYPE 2 DIABETES MELLITUS WITH LEFT EYE AFFECTED BY PROLIFERATIVE RETINOPATHY AND TRACTION RETINAL DETACHMENT NOT INVOLVING MACULA, WITH LONG-TERM CURRENT USE OF INSULIN: Primary | ICD-10-CM

## 2025-07-16 DIAGNOSIS — E11.69 DYSLIPIDEMIA ASSOCIATED WITH TYPE 2 DIABETES MELLITUS: ICD-10-CM

## 2025-07-16 DIAGNOSIS — E66.01 CLASS 2 SEVERE OBESITY WITH BODY MASS INDEX (BMI) OF 35 TO 39.9 WITH SERIOUS COMORBIDITY: ICD-10-CM

## 2025-07-16 DIAGNOSIS — E11.3532 TYPE 2 DIABETES MELLITUS WITH LEFT EYE AFFECTED BY PROLIFERATIVE RETINOPATHY AND TRACTION RETINAL DETACHMENT NOT INVOLVING MACULA, WITH LONG-TERM CURRENT USE OF INSULIN: Primary | ICD-10-CM

## 2025-07-16 DIAGNOSIS — E11.65 TYPE 2 DIABETES MELLITUS WITH HYPERGLYCEMIA, WITH LONG-TERM CURRENT USE OF INSULIN: ICD-10-CM

## 2025-07-16 DIAGNOSIS — Z79.4 TYPE 2 DIABETES MELLITUS WITH HYPERGLYCEMIA, WITH LONG-TERM CURRENT USE OF INSULIN: ICD-10-CM

## 2025-07-16 DIAGNOSIS — E66.812 CLASS 2 SEVERE OBESITY WITH BODY MASS INDEX (BMI) OF 35 TO 39.9 WITH SERIOUS COMORBIDITY: ICD-10-CM

## 2025-07-16 PROCEDURE — 99999 PR PBB SHADOW E&M-EST. PATIENT-LVL V: CPT | Mod: PBBFAC,,,

## 2025-07-16 RX ORDER — METFORMIN HYDROCHLORIDE 1000 MG/1
1000 TABLET ORAL 2 TIMES DAILY WITH MEALS
Qty: 180 TABLET | Refills: 3 | Status: SHIPPED | OUTPATIENT
Start: 2025-07-16 | End: 2026-07-16

## 2025-07-16 RX ORDER — INSULIN GLARGINE 100 [IU]/ML
24 INJECTION, SOLUTION SUBCUTANEOUS NIGHTLY
Qty: 21.6 ML | Refills: 3 | Status: SHIPPED | OUTPATIENT
Start: 2025-07-16 | End: 2026-07-16

## 2025-07-16 RX ORDER — INSULIN ASPART 100 [IU]/ML
INJECTION, SOLUTION INTRAVENOUS; SUBCUTANEOUS
Qty: 48.6 ML | Refills: 3 | Status: SHIPPED | OUTPATIENT
Start: 2025-07-16

## 2025-07-16 RX ORDER — TIRZEPATIDE 2.5 MG/.5ML
2.5 INJECTION, SOLUTION SUBCUTANEOUS
Qty: 2 ML | Refills: 11 | Status: SHIPPED | OUTPATIENT
Start: 2025-07-16 | End: 2026-07-16

## 2025-07-16 NOTE — ASSESSMENT & PLAN NOTE
- Labs : CMP, A1C, microalbumin/creatinine urine, lipid panel before the next visit   - A1c goal <7.0%   - Last A1C : 10.2%   - Reviewed logs/CGM: GMI 10.3%, 4% TIR, 96% high/very high. Global hyperglycemia. No hypoglycemia noted   - Will try for GLP1 agonist for diabetes and weight loss benefit   - Will try for SGLT2 inhibitor due to +microalbuminuria   - If medications approved, will titrate insulin as needed   - Referral to Diabetes Education for lifestyle management    Medication:    - Increase Lantus to 24 units nightly   - Increase Novolog to 7 units with meals + MDC (150/25)   - Continue Metformin 1000 mg twice daily   - Resume Farxiga 5 mg daily or start Jardiance 10 mg daily pending insurance approval   - Start Mounjaro 2.5 mg weekly pending insurance approval     - Reviewed goals of therapy are to get the best control we can without hypoglycemia.   - Reviewed patient's current insulin regimen. Clarified proper insulin dose and timing in relation to meals, etc. Insulin injection sites and proper rotation instructed.     - Advised frequent self blood glucose monitoring.  Patient encouraged to document glucose results and bring them to every clinic visit.   - Hypoglycemia precautions discussed. Instructed on precautions before driving.     - Call for Bg repeatedly < 70 or > 180.    - Close adherence to lifestyle changes recommended.    - Periodic follow ups for eye evaluations, foot care and dental care suggested.

## 2025-07-17 DIAGNOSIS — E11.51 TYPE 2 DIABETES MELLITUS WITH DIABETIC PERIPHERAL ANGIOPATHY WITHOUT GANGRENE, WITH LONG-TERM CURRENT USE OF INSULIN: Chronic | ICD-10-CM

## 2025-07-17 DIAGNOSIS — Z79.4 TYPE 2 DIABETES MELLITUS WITH DIABETIC PERIPHERAL ANGIOPATHY WITHOUT GANGRENE, WITH LONG-TERM CURRENT USE OF INSULIN: Chronic | ICD-10-CM

## 2025-07-17 NOTE — TELEPHONE ENCOUNTER
No care due was identified.  [facility] Embedded Care Due Messages. Reference number: [id number].   7/17/2025 3:13:36 PM CDT

## 2025-07-18 RX ORDER — ATORVASTATIN CALCIUM 80 MG/1
80 TABLET, FILM COATED ORAL NIGHTLY
Qty: 90 TABLET | Refills: 1 | Status: SHIPPED | OUTPATIENT
Start: 2025-07-18

## 2025-07-18 NOTE — TELEPHONE ENCOUNTER
Refill Routing Note   Medication(s) are not appropriate for processing by [facility] for the following reason(s):     [facility] not previously overridden by current provider--after initial override, the Refill Center will be able to continue overrides      Drug-disease interaction: : atorvastatin and JOVANNI (acute kidney injury)     [facility] action(s):  Defer           Pharmacist review requested: Yes     Appointments  past 12m or future 3m with PCP    Date Provider   Last Visit   1/2/2025 Augie uRiz MD   Next Visit   Visit date not found Augie Ruiz MD   ED visits in past 90 days: 0        Note composed:1:51 AM 07/18/2025

## 2025-07-18 NOTE — TELEPHONE ENCOUNTER
Refill Decision Note   Billy Rivera  is requesting a refill authorization.  Brief Assessment and Rationale for Refill:  Approve     Medication Therapy Plan:         Pharmacist review requested: Yes   Comments:     Note composed:6:26 AM 07/18/2025

## 2025-08-09 ENCOUNTER — TELEPHONE (OUTPATIENT)
Dept: PHARMACY | Facility: CLINIC | Age: 60
End: 2025-08-09
Payer: COMMERCIAL

## 2025-08-09 NOTE — TELEPHONE ENCOUNTER
[facility]/Population Health Chart Review & Patient Outreach Details For Medication Adherence Project    Reason for Outreach Encounter: 3rd Party payor non-compliance report ([employer], [employer], [employer], etc)  2.  Patient Outreach Method: Reviewed Patient Chart  3.   Medication in question: atorvastatin    LAST FILLED: 7/18/25 for 90 day supply  Hyperlipidemia Medications              atorvastatin (LIPITOR) 80 MG tablet Take 1 tablet by mouth in the evening               4.  Reviewed and or Updates Made To: Patient Chart  5. Outreach Outcomes and/or actions taken: Patient filled medication and is on track to be adherent

## 2025-08-11 ENCOUNTER — OFFICE VISIT (OUTPATIENT)
Dept: PODIATRY | Facility: CLINIC | Age: 60
End: 2025-08-11
Payer: COMMERCIAL

## 2025-08-11 VITALS
BODY MASS INDEX: 35.47 KG/M2 | HEART RATE: 66 BPM | DIASTOLIC BLOOD PRESSURE: 89 MMHG | SYSTOLIC BLOOD PRESSURE: 164 MMHG | HEIGHT: 74 IN

## 2025-08-11 DIAGNOSIS — M21.961 METATARSAL DEFORMITY, RIGHT: ICD-10-CM

## 2025-08-11 DIAGNOSIS — E11.49 TYPE II DIABETES MELLITUS WITH NEUROLOGICAL MANIFESTATIONS: Primary | ICD-10-CM

## 2025-08-11 DIAGNOSIS — L84 CORN OR CALLUS: ICD-10-CM

## 2025-08-11 PROCEDURE — 99999 PR PBB SHADOW E&M-EST. PATIENT-LVL IV: CPT | Mod: PBBFAC,,, | Performed by: PODIATRIST

## 2025-08-12 ENCOUNTER — PROCEDURE VISIT (OUTPATIENT)
Dept: OPHTHALMOLOGY | Facility: CLINIC | Age: 60
End: 2025-08-12
Payer: COMMERCIAL

## 2025-08-12 ENCOUNTER — CLINICAL SUPPORT (OUTPATIENT)
Dept: OPHTHALMOLOGY | Facility: CLINIC | Age: 60
End: 2025-08-12
Payer: COMMERCIAL

## 2025-08-12 DIAGNOSIS — E11.3513 TYPE 2 DIABETES MELLITUS WITH BOTH EYES AFFECTED BY PROLIFERATIVE RETINOPATHY AND MACULAR EDEMA, WITH LONG-TERM CURRENT USE OF INSULIN: ICD-10-CM

## 2025-08-12 DIAGNOSIS — Z79.4 TYPE 2 DIABETES MELLITUS WITH BOTH EYES AFFECTED BY PROLIFERATIVE RETINOPATHY AND MACULAR EDEMA, WITH LONG-TERM CURRENT USE OF INSULIN: Primary | ICD-10-CM

## 2025-08-12 DIAGNOSIS — Z79.4 TYPE 2 DIABETES MELLITUS WITH BOTH EYES AFFECTED BY PROLIFERATIVE RETINOPATHY AND MACULAR EDEMA, WITH LONG-TERM CURRENT USE OF INSULIN: ICD-10-CM

## 2025-08-12 DIAGNOSIS — E11.3532 TYPE 2 DIABETES MELLITUS WITH LEFT EYE AFFECTED BY PROLIFERATIVE RETINOPATHY AND TRACTION RETINAL DETACHMENT NOT INVOLVING MACULA, WITH LONG-TERM CURRENT USE OF INSULIN: ICD-10-CM

## 2025-08-12 DIAGNOSIS — Z79.4 TYPE 2 DIABETES MELLITUS WITH LEFT EYE AFFECTED BY PROLIFERATIVE RETINOPATHY AND TRACTION RETINAL DETACHMENT NOT INVOLVING MACULA, WITH LONG-TERM CURRENT USE OF INSULIN: ICD-10-CM

## 2025-08-12 DIAGNOSIS — E11.3513 TYPE 2 DIABETES MELLITUS WITH BOTH EYES AFFECTED BY PROLIFERATIVE RETINOPATHY AND MACULAR EDEMA, WITH LONG-TERM CURRENT USE OF INSULIN: Primary | ICD-10-CM

## 2025-08-12 DIAGNOSIS — H43.13 VITREOUS HEMORRHAGE, BILATERAL: ICD-10-CM

## 2025-08-12 PROCEDURE — 92014 COMPRE OPH EXAM EST PT 1/>: CPT | Mod: 25,S$GLB,, | Performed by: OPHTHALMOLOGY

## 2025-08-12 PROCEDURE — 67028 INJECTION EYE DRUG: CPT | Mod: 50,S$GLB,, | Performed by: OPHTHALMOLOGY

## 2025-08-12 PROCEDURE — 92134 CPTRZ OPH DX IMG PST SGM RTA: CPT | Mod: S$GLB,,, | Performed by: OPHTHALMOLOGY

## 2025-08-12 RX ADMIN — Medication 1.5 MG: at 09:08

## (undated) DEVICE — BANDAGE MATRIX HK LOOP 4IN 5YD

## (undated) DEVICE — GAUZE DERMACEA 3 PLY 4IN 4YD

## (undated) DEVICE — TAPE SILK 3IN

## (undated) DEVICE — SUT PROLENE 0 CT1 30IN BLUE

## (undated) DEVICE — SEE MEDLINE ITEM 154981

## (undated) DEVICE — STOCKINET TUBULAR 1 PLY 6X60IN

## (undated) DEVICE — DRAPE THREE-QTR REINF 53X77IN

## (undated) DEVICE — SEE MEDLINE ITEM 157131

## (undated) DEVICE — SLIDE STERILE FROSTED

## (undated) DEVICE — SPONGE GAUZE 16PLY 4X4

## (undated) DEVICE — KIT COLLECTION E SWAB REGULAR

## (undated) DEVICE — NDL HYPO REG 25G X 1 1/2

## (undated) DEVICE — DRAPE T EXTRM SURG 121X128X90

## (undated) DEVICE — SUT ETHIBOND 0 CR/MO-7 8-18

## (undated) DEVICE — GLOVE BIOGEL ECLIPSE SZ 6.5

## (undated) DEVICE — TRAY CATH UM FOLEY SIL W 16FR

## (undated) DEVICE — SEE MEDLINE ITEM 146417

## (undated) DEVICE — HOLDER TUBE

## (undated) DEVICE — SYR 10CC LUER LOCK

## (undated) DEVICE — BLADE SURG #15 CARBON STEEL

## (undated) DEVICE — KIT ANTIFOG

## (undated) DEVICE — TOURNIQUET SB QC DP 24X4IN

## (undated) DEVICE — SPONGE DERMACEA GAUZE 4X4

## (undated) DEVICE — SUT SILK 2-0 SH 18IN BLACK

## (undated) DEVICE — SEE MEDLINE ITEM 152487

## (undated) DEVICE — DRESSING GAUZE XEROFORM 5X9

## (undated) DEVICE — STOCKINET 4INX48

## (undated) DEVICE — BANDAGE DERMACEA STRETCH 4X1IN

## (undated) DEVICE — BLADE SAW STRNM 8.66X40.34X.6

## (undated) DEVICE — ELECTRODE REM PLYHSV RETURN 9

## (undated) DEVICE — BANDAGE ACE ELASTIC 6"

## (undated) DEVICE — GAUZE SPONGE 4X4 12PLY

## (undated) DEVICE — SUT ETHILON 2-0 BLK PS-2

## (undated) DEVICE — DRAPE C ARM 42 X 120 10/BX

## (undated) DEVICE — BANDAGE KERLIX P/P 2.25IN STER

## (undated) DEVICE — KNIFE OPHTH MICRO UNITOME 5MM

## (undated) DEVICE — PAD CAST SPECIALIST STRL 4

## (undated) DEVICE — TRAY MUSCLE LID EYE

## (undated) DEVICE — SEALER LIGASURE LAP 37CM 5MM

## (undated) DEVICE — BANDAGE ESMARK LATEX FREE 4INX

## (undated) DEVICE — KIT GREY EYE

## (undated) DEVICE — SOL IRR NACL .9% 3000ML

## (undated) DEVICE — TOURNIQUET SB QC DP 18X4IN

## (undated) DEVICE — PAD ABD 8X10 STERILE

## (undated) DEVICE — GOWN SURGICAL X-LARGE

## (undated) DEVICE — NDL 18GA X1 1/2 REG BEVEL

## (undated) DEVICE — DRESSING N ADH OIL EMUL 3X3

## (undated) DEVICE — TRAY MINOR ORTHO

## (undated) DEVICE — TRAY SKIN SCRUB WET PREMIUM

## (undated) DEVICE — SOL BSS BALANCED SALT

## (undated) DEVICE — CLOSURE SKIN STERI STRIP 1/2X4

## (undated) DEVICE — SYR ONLY LUER LOCK 20CC

## (undated) DEVICE — KIT IRR SUCTION HND PIECE

## (undated) DEVICE — SUT VICRYL PLUS 0 CT1 18IN

## (undated) DEVICE — CORD FOR BIPOLAR FORCEPS 12

## (undated) DEVICE — COVER LIGHT HANDLE 80/CA

## (undated) DEVICE — BANDAGE ELAS SOFTWRAP ST 4X5YD

## (undated) DEVICE — CONTAINER SPECIMEN OR STER 4OZ

## (undated) DEVICE — SUT 0 18IN SILK BLK BRAIDE

## (undated) DEVICE — DRESSING ABSRBNT ISLAND 3.6X8

## (undated) DEVICE — NDL HYPO A BEVEL 30X1/2

## (undated) DEVICE — BLADE SURG CARBON STEEL #10

## (undated) DEVICE — Device

## (undated) DEVICE — SEE MEDLINE ITEM 146270

## (undated) DEVICE — NDL HYPO A BEVEL 22X1 1/2

## (undated) DEVICE — SOL WATER STRL IRR 1000ML

## (undated) DEVICE — NDL HYPO STD REG BVL 22GX1.5IN

## (undated) DEVICE — DRAPE ABDOMINAL TIBURON 14X11

## (undated) DEVICE — TRAY MINOR ORTHO OMC

## (undated) DEVICE — SPONGE LAP 18X18 PREWASHED

## (undated) DEVICE — BANDAGE ESMARK ELASTIC ST 4X9

## (undated) DEVICE — CONTAINER SPECIMEN STRL 4OZ

## (undated) DEVICE — BANDAGE ESMARK 6X12

## (undated) DEVICE — GAUZE PACKING STRIP PLAIN 1X5

## (undated) DEVICE — SEE MEDLINE ITEM 146345

## (undated) DEVICE — SUT ETHILON 3/0 18IN PS-1

## (undated) DEVICE — ADHESIVE DERMABOND ADVANCED

## (undated) DEVICE — SUT ETHILON 4-0 PS2 18 BLK

## (undated) DEVICE — GOWN NONREINF SET-IN SLV XL

## (undated) DEVICE — DRESSING XEROFORM FOIL PK 1X8

## (undated) DEVICE — SEE MEDLINE ITEM 152522

## (undated) DEVICE — CANNULA ENDOPATH XCEL 5X100MM

## (undated) DEVICE — TOWEL OR DISP STRL BLUE 4/PK

## (undated) DEVICE — PADDING 4X4YD SPECIALIST100

## (undated) DEVICE — GOWN ISOLATION IMPERVIOUS

## (undated) DEVICE — PENCIL ROCKER SWITCH 10FT CORD

## (undated) DEVICE — APPLICATOR CHLORAPREP ORN 26ML

## (undated) DEVICE — DRESSING XEROFORM STRL 2X2

## (undated) DEVICE — BLADE SAW RECIPROCATING 25.5X4

## (undated) DEVICE — SEE MEDLINE ITEM 146298

## (undated) DEVICE — SYR 30CC LUER LOCK

## (undated) DEVICE — GOWN SMART IMP BREATHABLE XXLG

## (undated) DEVICE — SEE MEDLINE ITEM 152515

## (undated) DEVICE — BACKFLUSH 25GA SOFT-TIP DISP

## (undated) DEVICE — NDL HYPO STD REG BVL 30G 0.5IN

## (undated) DEVICE — SEE MEDLINE ITEM 157144

## (undated) DEVICE — SEE L#120831

## (undated) DEVICE — SUT CTD VICRYL UND BR CP-1

## (undated) DEVICE — SUT CTD VICRYL 0 UND BR

## (undated) DEVICE — COVER MAYO STND XL 30X57IN

## (undated) DEVICE — SOL PVP-I SCRUB 7.5% 4OZ

## (undated) DEVICE — COVER PROXIMA MAYO STAND

## (undated) DEVICE — SUT 0 VICRYL / CT-1

## (undated) DEVICE — TUBE SUCTION YANKAUER

## (undated) DEVICE — BOWL CEMENT

## (undated) DEVICE — DRAPE U SPLIT SHEET 54X76IN

## (undated) DEVICE — GAUZE AVANT SPNG 4PLY STRL 4X4

## (undated) DEVICE — SOL GONAK

## (undated) DEVICE — GAUZE SPONGE BULKEE 6X6.75IN

## (undated) DEVICE — DRAPE STERI-DRAPE 1000 17X11IN

## (undated) DEVICE — SET BASIN 48X48IN 6000ML RING

## (undated) DEVICE — BANDAGE CONFORM 3IN STRL

## (undated) DEVICE — DRESSING EYE OVAL LF

## (undated) DEVICE — SEE MEDLINE ITEM 157181

## (undated) DEVICE — PACK TOTAL PLUS 25G VITRECTOMY

## (undated) DEVICE — CONNECTOR Y 3/8X3/8X3/8

## (undated) DEVICE — PAD CAST SPECIALIST STRL 6

## (undated) DEVICE — SUT MONO 3-0 PS-2 18 PLST

## (undated) DEVICE — SOL BETADINE 5%

## (undated) DEVICE — DRESSING N ADH OIL EMUL 3X8

## (undated) DEVICE — TUBING HF INSUFFLATION W/ FLTR

## (undated) DEVICE — SUT MCRYL PLUS 4-0 PS2 27IN

## (undated) DEVICE — SEE MEDLINE ITEM 152529

## (undated) DEVICE — PAD ABDOMINAL STERILE 5X9IN

## (undated) DEVICE — GAUZE STRIP CURITY 180X2

## (undated) DEVICE — SEE MEDLINE ITEM 146347

## (undated) DEVICE — BANDAGE ACE DOUBLE STER 6IN

## (undated) DEVICE — SUT ETHILON 3-0 PS2 18 BLK

## (undated) DEVICE — MARKER SKIN STND TIP BLUE BARR

## (undated) DEVICE — HANDPIECE INTERPULSE SET

## (undated) DEVICE — SUT D SPECIAL VICRYL 2-0

## (undated) DEVICE — LEGGINGS 48X31 INCH

## (undated) DEVICE — KIT PERFLUOROCARBON LIQUID

## (undated) DEVICE — NDL ECLIPSE SAF REG 25GX1.5IN

## (undated) DEVICE — MONOSOF SUTURES

## (undated) DEVICE — TAPE SURG DURAPORE 2 SGL USE

## (undated) DEVICE — PEN LIGHTS DIAGNOSTIC C-LINE

## (undated) DEVICE — DRESSING XEROFORM NONADH 1X8IN

## (undated) DEVICE — PACK INSTRUMENT COVER DISPO

## (undated) DEVICE — SUT VICRYL 3-0 27 SH

## (undated) DEVICE — SUT PROLENE 3-0 30SH

## (undated) DEVICE — SOL BALANCED SALT 500ML

## (undated) DEVICE — BANDAGE ROLL COTTN 4.5INX4.1YD

## (undated) DEVICE — DRESSING XEROFORM 1X8IN

## (undated) DEVICE — SEE MEDLINE ITEM 157150

## (undated) DEVICE — GAUZE PACKING STRIP PLN 1/2X5

## (undated) DEVICE — SYRINGE 30CC LL W/O NDL

## (undated) DEVICE — GOWN POLY REINF BRTH SLV XL

## (undated) DEVICE — BLADE SYSTEM 6 25MMX90MMX1.27M

## (undated) DEVICE — LUBRICANT SURGILUBE 2 OZ

## (undated) DEVICE — SYR DISP LL 5CC

## (undated) DEVICE — SUT 4-0 VICRYL / SH

## (undated) DEVICE — SEE MEDLINE ITEM 152622

## (undated) DEVICE — PACK ARTHROSCOPY W/ISO BAC

## (undated) DEVICE — SYR ORAL ENTERAL PUR 12ML

## (undated) DEVICE — SEE L#95700

## (undated) DEVICE — SEE MEDLINE ITEM 152514

## (undated) DEVICE — NEEDLE HYPODERMIC HUB LUER LOC

## (undated) DEVICE — SEE MEDLINE ITEM 157117

## (undated) DEVICE — SEE MEDLINE ITEM 156902

## (undated) DEVICE — DRAPE STERI U-SHAPED 47X51IN

## (undated) DEVICE — SUT 1 36IN PDS II VIO MONO

## (undated) DEVICE — DRAPE PLASTIC U 60X72

## (undated) DEVICE — GOWN SURG 2XL DISP TIE BACK

## (undated) DEVICE — NDL BOX COUNTER

## (undated) DEVICE — STOCKINETTE TUBULAR 2PL 6 X 4

## (undated) DEVICE — TOURNIQUET SB QC DP 34X4IN

## (undated) DEVICE — PADDING CAST AST SOFT-ROLL 3X4

## (undated) DEVICE — TRAY MINOR GEN SURG OMC